# Patient Record
Sex: FEMALE | Race: WHITE | NOT HISPANIC OR LATINO | Employment: OTHER | URBAN - METROPOLITAN AREA
[De-identification: names, ages, dates, MRNs, and addresses within clinical notes are randomized per-mention and may not be internally consistent; named-entity substitution may affect disease eponyms.]

---

## 2017-01-17 ENCOUNTER — CONVERSION ENCOUNTER (OUTPATIENT)
Dept: FAMILY MEDICINE CLINIC | Facility: CLINIC | Age: 77
End: 2017-01-17

## 2017-01-17 ENCOUNTER — HOSPITAL ENCOUNTER (OUTPATIENT)
Dept: FAMILY MEDICINE CLINIC | Facility: CLINIC | Age: 77
Setting detail: SPECIMEN
Discharge: HOME | End: 2017-01-17
Attending: FAMILY MEDICINE | Admitting: FAMILY MEDICINE

## 2017-01-17 LAB
ALBUMIN SERPL-MCNC: 4 G/DL (ref 3.5–4.8)
ALBUMIN/GLOB SERPL: 1.3 {RATIO} (ref 1–1.7)
ALP SERPL-CCNC: 126 IU/L (ref 32–91)
ALT SERPL-CCNC: 20 IU/L (ref 14–54)
ANION GAP SERPL CALC-SCNC: 13.1 MMOL/L (ref 10–20)
AST SERPL-CCNC: 25 IU/L (ref 15–41)
BILIRUB SERPL-MCNC: 0.5 MG/DL (ref 0.3–1.2)
BUN SERPL-MCNC: 22 MG/DL (ref 8–20)
BUN/CREAT SERPL: 20 (ref 5.4–26.2)
CALCIUM SERPL-MCNC: 9.4 MG/DL (ref 8.9–10.3)
CHLORIDE SERPL-SCNC: 107 MMOL/L (ref 101–111)
CHOLEST SERPL-MCNC: 269 MG/DL
CHOLEST/HDLC SERPL: 5.3 {RATIO}
CONV CO2: 27 MMOL/L (ref 22–32)
CONV LDL CHOLESTEROL DIRECT: 164 MG/DL (ref 0–100)
CONV TOTAL PROTEIN: 7.1 G/DL (ref 6.1–7.9)
CREAT UR-MCNC: 1.1 MG/DL (ref 0.4–1)
FOLATE SERPL-MCNC: 12.2 NG/ML (ref 5.9–24.8)
GLOBULIN UR ELPH-MCNC: 3.1 G/DL (ref 2.5–3.8)
GLUCOSE SERPL-MCNC: 106 MG/DL (ref 65–99)
HDLC SERPL-MCNC: 51 MG/DL
LDLC/HDLC SERPL: 3.2 {RATIO}
LIPID INTERPRETATION: ABNORMAL
POTASSIUM SERPL-SCNC: 5.1 MMOL/L (ref 3.6–5.1)
SODIUM SERPL-SCNC: 142 MMOL/L (ref 136–144)
TRIGL SERPL-MCNC: 363 MG/DL
TSH SERPL-ACNC: 2.66 UIU/ML (ref 0.34–5.6)
VIT B12 SERPL-MCNC: 352 PG/ML (ref 180–914)
VLDLC SERPL CALC-MCNC: 54.4 MG/DL

## 2017-02-07 ENCOUNTER — HOSPITAL ENCOUNTER (OUTPATIENT)
Dept: MRI IMAGING | Facility: HOSPITAL | Age: 77
Discharge: HOME | End: 2017-02-07
Attending: FAMILY MEDICINE | Admitting: FAMILY MEDICINE

## 2017-03-27 ENCOUNTER — CONVERSION ENCOUNTER (OUTPATIENT)
Dept: FAMILY MEDICINE CLINIC | Facility: CLINIC | Age: 77
End: 2017-03-27

## 2017-04-24 ENCOUNTER — CONVERSION ENCOUNTER (OUTPATIENT)
Dept: FAMILY MEDICINE CLINIC | Facility: CLINIC | Age: 77
End: 2017-04-24

## 2017-04-27 ENCOUNTER — HOSPITAL ENCOUNTER (OUTPATIENT)
Dept: CT IMAGING | Facility: HOSPITAL | Age: 77
Discharge: HOME | End: 2017-04-27
Attending: FAMILY MEDICINE | Admitting: FAMILY MEDICINE

## 2017-04-27 LAB — CREAT BLDA-MCNC: 1.1 MG/DL (ref 0.6–1.3)

## 2017-05-08 ENCOUNTER — OFFICE (AMBULATORY)
Dept: URBAN - METROPOLITAN AREA CLINIC 64 | Facility: CLINIC | Age: 77
End: 2017-05-08

## 2017-05-08 VITALS
HEIGHT: 65 IN | DIASTOLIC BLOOD PRESSURE: 80 MMHG | WEIGHT: 181 LBS | HEART RATE: 59 BPM | SYSTOLIC BLOOD PRESSURE: 148 MMHG

## 2017-05-08 DIAGNOSIS — K21.9 GASTRO-ESOPHAGEAL REFLUX DISEASE WITHOUT ESOPHAGITIS: ICD-10-CM

## 2017-05-08 DIAGNOSIS — R05 COUGH: ICD-10-CM

## 2017-05-08 PROCEDURE — 99213 OFFICE O/P EST LOW 20 MIN: CPT | Performed by: INTERNAL MEDICINE

## 2017-05-08 RX ORDER — DEXLANSOPRAZOLE 60 MG/1
60 CAPSULE, DELAYED RELEASE ORAL
Qty: 90 | Refills: 3 | Status: COMPLETED
End: 2019-03-07

## 2017-05-08 RX ORDER — METOCLOPRAMIDE 10 MG/1
10 TABLET ORAL
Qty: 30 | Refills: 11 | Status: COMPLETED
Start: 2017-05-08 | End: 2018-01-03

## 2017-05-10 ENCOUNTER — HOSPITAL ENCOUNTER (OUTPATIENT)
Dept: ULTRASOUND IMAGING | Facility: HOSPITAL | Age: 77
Discharge: HOME | End: 2017-05-10
Attending: FAMILY MEDICINE | Admitting: FAMILY MEDICINE

## 2017-08-07 ENCOUNTER — CONVERSION ENCOUNTER (OUTPATIENT)
Dept: FAMILY MEDICINE CLINIC | Facility: CLINIC | Age: 77
End: 2017-08-07

## 2017-08-07 ENCOUNTER — HOSPITAL ENCOUNTER (OUTPATIENT)
Dept: FAMILY MEDICINE CLINIC | Facility: CLINIC | Age: 77
Setting detail: SPECIMEN
Discharge: HOME | End: 2017-08-07
Attending: FAMILY MEDICINE | Admitting: FAMILY MEDICINE

## 2017-08-07 LAB
ALBUMIN SERPL-MCNC: 3.8 G/DL (ref 3.5–4.8)
ALBUMIN/GLOB SERPL: 1.5 {RATIO} (ref 1–1.7)
ALP SERPL-CCNC: 102 IU/L (ref 32–91)
ALT SERPL-CCNC: ABNORMAL IU/L (ref 14–54)
ANION GAP SERPL CALC-SCNC: 16.1 MMOL/L (ref 10–20)
AST SERPL-CCNC: ABNORMAL IU/L (ref 15–41)
BILIRUB SERPL-MCNC: ABNORMAL MG/DL (ref 0.3–1.2)
BUN SERPL-MCNC: 23 MG/DL (ref 8–20)
BUN/CREAT SERPL: 20.9 (ref 5.4–26.2)
CALCIUM SERPL-MCNC: 9.3 MG/DL (ref 8.9–10.3)
CHLORIDE SERPL-SCNC: 110 MMOL/L (ref 101–111)
CHOLEST SERPL-MCNC: 179 MG/DL
CHOLEST/HDLC SERPL: 4 {RATIO}
CONV ABS BANDS: 0.3 10*3/UL
CONV CO2: 20 MMOL/L (ref 22–32)
CONV LDL CHOLESTEROL DIRECT: 106 MG/DL (ref 0–100)
CONV TOTAL PROTEIN: 6.4 G/DL (ref 6.1–7.9)
CREAT UR-MCNC: 1.1 MG/DL (ref 0.4–1)
DIFFERENTIAL METHOD BLD: (no result)
EOSINOPHIL # BLD AUTO: 0.1 10*3/UL (ref 0–0.3)
EOSINOPHIL # BLD AUTO: 1 % (ref 0–3)
ERYTHROCYTE [DISTWIDTH] IN BLOOD BY AUTOMATED COUNT: 17 % (ref 11.5–14.5)
GLOBULIN UR ELPH-MCNC: 2.6 G/DL (ref 2.5–3.8)
GLUCOSE SERPL-MCNC: 105 MG/DL (ref 65–99)
HCT VFR BLD AUTO: 36.8 % (ref 35–49)
HDLC SERPL-MCNC: 45 MG/DL
HGB BLD-MCNC: 11.8 G/DL (ref 12–15)
LDLC/HDLC SERPL: 2.4 {RATIO}
LIPID INTERPRETATION: ABNORMAL
LYMPHOCYTES # BLD AUTO: 9.4 10*3/UL (ref 0.8–4.8)
LYMPHOCYTES NFR BLD AUTO: 66 % (ref 18–42)
MCH RBC QN AUTO: 26.2 PG (ref 26–32)
MCHC RBC AUTO-ENTMCNC: 32 G/DL (ref 32–36)
MCV RBC AUTO: 81.7 FL (ref 80–94)
MONOCYTES # BLD AUTO: 0.8 10*3/UL (ref 0.1–1.3)
MONOCYTES NFR BLD AUTO: 6 % (ref 2–11)
NEUTROPHILS # BLD AUTO: 3.5 10*3/UL (ref 2.3–8.6)
NEUTROPHILS NFR BLD AUTO: 25 % (ref 50–75)
NEUTS BAND NFR BLD MANUAL: 2 % (ref 0–5)
PATHOLOGIST REVIEW: (no result)
PATHOLOGIST REVIEW: (no result)
PLATELET # BLD AUTO: 180 10*3/UL (ref 150–450)
PMV BLD AUTO: 10.7 FL (ref 7.4–10.4)
POTASSIUM SERPL-SCNC: ABNORMAL MMOL/L (ref 3.6–5.1)
RBC # BLD AUTO: 4.51 10*6/UL (ref 4–5.4)
SMUDGE CELLS BLD QL SMEAR: (no result) /100{WBCS}
SODIUM SERPL-SCNC: 141 MMOL/L (ref 136–144)
TRIGL SERPL-MCNC: 206 MG/DL
TSH SERPL-ACNC: 1.21 UIU/ML (ref 0.34–5.6)
VLDLC SERPL CALC-MCNC: 28.8 MG/DL
WBC # BLD AUTO: 14.1 10*3/UL (ref 4.5–11.5)

## 2017-08-23 ENCOUNTER — HOSPITAL ENCOUNTER (OUTPATIENT)
Dept: MAMMOGRAPHY | Facility: HOSPITAL | Age: 77
Discharge: HOME | End: 2017-08-23
Attending: FAMILY MEDICINE | Admitting: FAMILY MEDICINE

## 2017-08-23 ENCOUNTER — HOSPITAL ENCOUNTER (OUTPATIENT)
Dept: OTHER | Facility: HOSPITAL | Age: 77
Discharge: HOME | End: 2017-08-23
Attending: FAMILY MEDICINE | Admitting: FAMILY MEDICINE

## 2017-08-23 LAB
ALBUMIN SERPL-MCNC: 4 G/DL (ref 3.5–4.8)
ALBUMIN/GLOB SERPL: 1.4 {RATIO} (ref 1–1.7)
ALP SERPL-CCNC: 102 IU/L (ref 32–91)
ALT SERPL-CCNC: 16 IU/L (ref 14–54)
ANION GAP SERPL CALC-SCNC: 16.8 MMOL/L (ref 10–20)
AST SERPL-CCNC: 22 IU/L (ref 15–41)
BILIRUB SERPL-MCNC: 0.7 MG/DL (ref 0.3–1.2)
BUN SERPL-MCNC: 44 MG/DL (ref 8–20)
BUN/CREAT SERPL: 24.4 (ref 5.4–26.2)
CALCIUM SERPL-MCNC: 9.1 MG/DL (ref 8.9–10.3)
CHLORIDE SERPL-SCNC: 109 MMOL/L (ref 101–111)
CHOLEST SERPL-MCNC: 194 MG/DL
CHOLEST/HDLC SERPL: 4.9 {RATIO}
CONV ABS BANDS: 0.2 10*3/UL
CONV ANISOCYTES: SLIGHT
CONV CO2: 18 MMOL/L (ref 22–32)
CONV LDL CHOLESTEROL DIRECT: 110 MG/DL (ref 0–100)
CONV OVALOCYTES IN BLOOD BY LIGHT MICROSCOPY: (no result)
CONV PLATELET CLUMP  IN BLOOD BY LIGHT MICROSCOPY: (no result)
CONV POLYCHROMASIA IN BLOOD BY LIGHT MICROSCOPY: SLIGHT
CONV TOTAL PROTEIN: 6.8 G/DL (ref 6.1–7.9)
CREAT UR-MCNC: 1.8 MG/DL (ref 0.4–1)
DIFFERENTIAL METHOD BLD: (no result)
ERYTHROCYTE [DISTWIDTH] IN BLOOD BY AUTOMATED COUNT: 17.5 % (ref 11.5–14.5)
GLOBULIN UR ELPH-MCNC: 2.8 G/DL (ref 2.5–3.8)
GLUCOSE SERPL-MCNC: 132 MG/DL (ref 65–99)
HCT VFR BLD AUTO: 37.6 % (ref 35–49)
HDLC SERPL-MCNC: 39 MG/DL
HGB BLD-MCNC: 11.8 G/DL (ref 12–15)
LDLC/HDLC SERPL: 2.8 {RATIO}
LIPID INTERPRETATION: ABNORMAL
LYMPHOCYTES # BLD AUTO: 11.2 10*3/UL (ref 0.8–4.8)
LYMPHOCYTES NFR BLD AUTO: 74 % (ref 18–42)
MCH RBC QN AUTO: 25.5 PG (ref 26–32)
MCHC RBC AUTO-ENTMCNC: 31.4 G/DL (ref 32–36)
MCV RBC AUTO: 81.4 FL (ref 80–94)
MONOCYTES # BLD AUTO: 0.2 10*3/UL (ref 0.1–1.3)
MONOCYTES NFR BLD AUTO: 1 % (ref 2–11)
NEUTROPHILS # BLD AUTO: 3.6 10*3/UL (ref 2.3–8.6)
NEUTROPHILS NFR BLD AUTO: 24 % (ref 50–75)
NEUTS BAND NFR BLD MANUAL: 1 % (ref 0–5)
PATHOLOGIST REVIEW: (no result)
PATHOLOGIST REVIEW: (no result)
PLATELET # BLD AUTO: 193 10*3/UL (ref 150–450)
PMV BLD AUTO: 10.3 FL (ref 7.4–10.4)
POTASSIUM SERPL-SCNC: 4.8 MMOL/L (ref 3.6–5.1)
RBC # BLD AUTO: 4.62 10*6/UL (ref 4–5.4)
SCHISTOCYTES BLD QL SMEAR: (no result)
SMUDGE CELLS BLD QL SMEAR: (no result) /100{WBCS}
SODIUM SERPL-SCNC: 139 MMOL/L (ref 136–144)
TRIGL SERPL-MCNC: 279 MG/DL
TSH SERPL-ACNC: 1.34 UIU/ML (ref 0.34–5.6)
VLDLC SERPL CALC-MCNC: 44.4 MG/DL
WBC # BLD AUTO: 15.2 10*3/UL (ref 4.5–11.5)

## 2017-08-28 ENCOUNTER — HOSPITAL ENCOUNTER (OUTPATIENT)
Dept: ONCOLOGY | Facility: CLINIC | Age: 77
Setting detail: INFUSION SERIES
Discharge: HOME | End: 2017-08-28
Attending: INTERNAL MEDICINE | Admitting: INTERNAL MEDICINE

## 2017-08-28 ENCOUNTER — CLINICAL SUPPORT (OUTPATIENT)
Dept: ONCOLOGY | Facility: HOSPITAL | Age: 77
End: 2017-08-28

## 2017-08-28 ENCOUNTER — HOSPITAL ENCOUNTER (OUTPATIENT)
Dept: ONCOLOGY | Facility: HOSPITAL | Age: 77
Discharge: HOME | End: 2017-08-28
Attending: INTERNAL MEDICINE | Admitting: INTERNAL MEDICINE

## 2017-08-28 LAB
FERRITIN SERPL-MCNC: 22 NG/ML (ref 11–307)
FOLATE SERPL-MCNC: 13.2 NG/ML (ref 5.9–24.8)
IRON SATN MFR SERPL: 11 % (ref 15–50)
IRON SERPL-MCNC: 52 UG/DL (ref 28–170)
LDH SERPL-CCNC: 133 IU/L (ref 98–192)
MAGNESIUM UR-MCNC: 0.78 % (ref 0.5–1.5)
RETICS/RBC NFR MANUAL: 0.04 10*6/UL
TIBC SERPL-MCNC: 471 UG/DL (ref 228–428)
VIT B12 SERPL-MCNC: 312 PG/ML (ref 180–914)

## 2017-08-28 NOTE — PROGRESS NOTES
PATIENTS ONCOLOGY RECORD LOCATED IN [facility]      Subjective     Name:  LILI APPLE     Date:  2017  Address:  [address] [place] [address] [address] [address]  Home: [phone]  :  1940 AGE:  77 y.o.        RECORDS OBTAINED:  Patients Oncology Record is located in [facility]

## 2017-08-31 ENCOUNTER — HOSPITAL ENCOUNTER (OUTPATIENT)
Dept: FAMILY MEDICINE CLINIC | Facility: CLINIC | Age: 77
Setting detail: SPECIMEN
Discharge: HOME | End: 2017-08-31
Attending: FAMILY MEDICINE | Admitting: FAMILY MEDICINE

## 2017-08-31 LAB
ANION GAP SERPL CALC-SCNC: 14.6 MMOL/L (ref 10–20)
BUN SERPL-MCNC: 40 MG/DL (ref 8–20)
BUN/CREAT SERPL: 21.1 (ref 5.4–26.2)
CALCIUM SERPL-MCNC: 9.6 MG/DL (ref 8.9–10.3)
CHLORIDE SERPL-SCNC: 109 MMOL/L (ref 101–111)
CONV CO2: 22 MMOL/L (ref 22–32)
CREAT UR-MCNC: 1.9 MG/DL (ref 0.4–1)
GLUCOSE SERPL-MCNC: 115 MG/DL (ref 65–99)
POTASSIUM SERPL-SCNC: 4.6 MMOL/L (ref 3.6–5.1)
SODIUM SERPL-SCNC: 141 MMOL/L (ref 136–144)

## 2017-09-06 ENCOUNTER — HOSPITAL ENCOUNTER (OUTPATIENT)
Dept: ONCOLOGY | Facility: HOSPITAL | Age: 77
Discharge: HOME | End: 2017-09-06
Attending: INTERNAL MEDICINE | Admitting: INTERNAL MEDICINE

## 2017-09-06 ENCOUNTER — HOSPITAL ENCOUNTER (OUTPATIENT)
Dept: ONCOLOGY | Facility: CLINIC | Age: 77
Setting detail: INFUSION SERIES
Discharge: HOME | End: 2017-09-06
Attending: INTERNAL MEDICINE | Admitting: INTERNAL MEDICINE

## 2017-09-06 ENCOUNTER — CLINICAL SUPPORT (OUTPATIENT)
Dept: ONCOLOGY | Facility: HOSPITAL | Age: 77
End: 2017-09-06

## 2017-09-06 NOTE — PROGRESS NOTES
PATIENTS ONCOLOGY RECORD LOCATED IN [facility]      Subjective     Name:  LILI APPLE     Date:  2017  Address:  [address] [place] [address] [address] [address]  Home: [phone]  :  1940 AGE:  77 y.o.        RECORDS OBTAINED:  Patients Oncology Record is located in [facility]

## 2017-09-25 ENCOUNTER — HOSPITAL ENCOUNTER (OUTPATIENT)
Dept: FAMILY MEDICINE CLINIC | Facility: CLINIC | Age: 77
Setting detail: SPECIMEN
Discharge: HOME | End: 2017-09-25
Attending: FAMILY MEDICINE | Admitting: FAMILY MEDICINE

## 2017-09-25 LAB
ALBUMIN SERPL-MCNC: 4.1 G/DL (ref 3.5–4.8)
ALBUMIN/GLOB SERPL: 1.8 {RATIO} (ref 1–1.7)
ALP SERPL-CCNC: 105 IU/L (ref 32–91)
ALT SERPL-CCNC: 19 IU/L (ref 14–54)
ANION GAP SERPL CALC-SCNC: 12.2 MMOL/L (ref 10–20)
AST SERPL-CCNC: 20 IU/L (ref 15–41)
BILIRUB SERPL-MCNC: 0.6 MG/DL (ref 0.3–1.2)
BUN SERPL-MCNC: 38 MG/DL (ref 8–20)
BUN/CREAT SERPL: 23.8 (ref 5.4–26.2)
CALCIUM SERPL-MCNC: 9.7 MG/DL (ref 8.9–10.3)
CHLORIDE SERPL-SCNC: 105 MMOL/L (ref 101–111)
CONV CO2: 22 MMOL/L (ref 22–32)
CONV TOTAL PROTEIN: 6.4 G/DL (ref 6.1–7.9)
CREAT UR-MCNC: 1.6 MG/DL (ref 0.4–1)
GLOBULIN UR ELPH-MCNC: 2.3 G/DL (ref 2.5–3.8)
GLUCOSE SERPL-MCNC: 104 MG/DL (ref 65–99)
LDH SERPL-CCNC: 117 IU/L (ref 98–192)
POTASSIUM SERPL-SCNC: 5.2 MMOL/L (ref 3.6–5.1)
SODIUM SERPL-SCNC: 134 MMOL/L (ref 136–144)

## 2017-10-11 ENCOUNTER — HOSPITAL ENCOUNTER (OUTPATIENT)
Dept: ULTRASOUND IMAGING | Facility: HOSPITAL | Age: 77
Discharge: HOME | End: 2017-10-11
Attending: INTERNAL MEDICINE | Admitting: INTERNAL MEDICINE

## 2017-10-18 ENCOUNTER — HOSPITAL ENCOUNTER (OUTPATIENT)
Dept: ONCOLOGY | Facility: CLINIC | Age: 77
Setting detail: INFUSION SERIES
Discharge: HOME | End: 2017-10-18
Attending: INTERNAL MEDICINE | Admitting: INTERNAL MEDICINE

## 2017-10-18 ENCOUNTER — HOSPITAL ENCOUNTER (OUTPATIENT)
Dept: ONCOLOGY | Facility: HOSPITAL | Age: 77
Discharge: HOME | End: 2017-10-18
Attending: INTERNAL MEDICINE | Admitting: INTERNAL MEDICINE

## 2017-10-18 ENCOUNTER — CLINICAL SUPPORT (OUTPATIENT)
Dept: ONCOLOGY | Facility: HOSPITAL | Age: 77
End: 2017-10-18

## 2017-10-18 NOTE — PROGRESS NOTES
PATIENTS ONCOLOGY RECORD LOCATED IN [facility]      Subjective     Name:  LILI APPLE     Date:  10/18/2017  Address:  [address] [place] [address] [address] [address]  Home: [phone]  :  1940 AGE:  77 y.o.        RECORDS OBTAINED:  Patients Oncology Record is located in [facility]

## 2017-12-13 ENCOUNTER — HOSPITAL ENCOUNTER (OUTPATIENT)
Dept: ONCOLOGY | Facility: CLINIC | Age: 77
Setting detail: INFUSION SERIES
Discharge: HOME | End: 2017-12-13
Attending: INTERNAL MEDICINE | Admitting: INTERNAL MEDICINE

## 2017-12-13 ENCOUNTER — CLINICAL SUPPORT (OUTPATIENT)
Dept: ONCOLOGY | Facility: HOSPITAL | Age: 77
End: 2017-12-13

## 2017-12-13 ENCOUNTER — HOSPITAL ENCOUNTER (OUTPATIENT)
Dept: ONCOLOGY | Facility: HOSPITAL | Age: 77
Discharge: HOME | End: 2017-12-13
Attending: INTERNAL MEDICINE | Admitting: INTERNAL MEDICINE

## 2017-12-13 LAB
ALBUMIN SERPL-MCNC: 3.9 G/DL (ref 3.5–4.8)
ALBUMIN/GLOB SERPL: 1.4 {RATIO} (ref 1–1.7)
ALP SERPL-CCNC: 110 IU/L (ref 32–91)
ALT SERPL-CCNC: 18 IU/L (ref 14–54)
ANION GAP SERPL CALC-SCNC: 12.3 MMOL/L (ref 10–20)
AST SERPL-CCNC: 22 IU/L (ref 15–41)
BILIRUB SERPL-MCNC: 0.4 MG/DL (ref 0.3–1.2)
BUN SERPL-MCNC: 32 MG/DL (ref 8–20)
BUN/CREAT SERPL: 24.6 (ref 5.4–26.2)
CALCIUM SERPL-MCNC: 9.3 MG/DL (ref 8.9–10.3)
CHLORIDE SERPL-SCNC: 106 MMOL/L (ref 101–111)
CONV CO2: 23 MMOL/L (ref 22–32)
CONV TOTAL PROTEIN: 6.6 G/DL (ref 6.1–7.9)
CREAT UR-MCNC: 1.3 MG/DL (ref 0.4–1)
GLOBULIN UR ELPH-MCNC: 2.7 G/DL (ref 2.5–3.8)
GLUCOSE SERPL-MCNC: 101 MG/DL (ref 65–99)
LDH SERPL-CCNC: 126 IU/L (ref 98–192)
POTASSIUM SERPL-SCNC: 4.3 MMOL/L (ref 3.6–5.1)
SODIUM SERPL-SCNC: 137 MMOL/L (ref 136–144)

## 2017-12-13 NOTE — PROGRESS NOTES
PATIENTS ONCOLOGY RECORD LOCATED IN [facility]      Subjective     Name:  LILI APPLE     Date:  2017  Address:  [address] [place] [address] [address] [address]  Home: [phone]  :  1940 AGE:  77 y.o.        RECORDS OBTAINED:  Patients Oncology Record is located in [facility]

## 2017-12-20 ENCOUNTER — HOSPITAL ENCOUNTER (OUTPATIENT)
Dept: ONCOLOGY | Facility: CLINIC | Age: 77
Setting detail: INFUSION SERIES
Discharge: HOME | End: 2017-12-20
Attending: INTERNAL MEDICINE | Admitting: INTERNAL MEDICINE

## 2017-12-20 ENCOUNTER — HOSPITAL ENCOUNTER (OUTPATIENT)
Dept: ONCOLOGY | Facility: HOSPITAL | Age: 77
Discharge: HOME | End: 2017-12-20
Attending: INTERNAL MEDICINE | Admitting: INTERNAL MEDICINE

## 2017-12-20 ENCOUNTER — CLINICAL SUPPORT (OUTPATIENT)
Dept: ONCOLOGY | Facility: HOSPITAL | Age: 77
End: 2017-12-20

## 2017-12-20 NOTE — PROGRESS NOTES
PATIENTS ONCOLOGY RECORD LOCATED IN [facility]      Subjective     Name:  LILI APPLE     Date:  2017  Address:  [address] [place] [address] [address] [address]  Home: [phone]  :  1940 AGE:  77 y.o.        RECORDS OBTAINED:  Patients Oncology Record is located in [facility]

## 2018-01-03 ENCOUNTER — OFFICE (AMBULATORY)
Dept: URBAN - METROPOLITAN AREA CLINIC 64 | Facility: CLINIC | Age: 78
End: 2018-01-03

## 2018-01-03 VITALS
DIASTOLIC BLOOD PRESSURE: 75 MMHG | HEART RATE: 67 BPM | WEIGHT: 178 LBS | HEIGHT: 65 IN | SYSTOLIC BLOOD PRESSURE: 147 MMHG

## 2018-01-03 DIAGNOSIS — K59.00 CONSTIPATION, UNSPECIFIED: ICD-10-CM

## 2018-01-03 PROCEDURE — 99214 OFFICE O/P EST MOD 30 MIN: CPT | Performed by: NURSE PRACTITIONER

## 2018-01-17 ENCOUNTER — HOSPITAL ENCOUNTER (OUTPATIENT)
Dept: FAMILY MEDICINE CLINIC | Facility: CLINIC | Age: 78
Setting detail: SPECIMEN
Discharge: HOME | End: 2018-01-17
Attending: FAMILY MEDICINE | Admitting: FAMILY MEDICINE

## 2018-01-17 ENCOUNTER — CONVERSION ENCOUNTER (OUTPATIENT)
Dept: FAMILY MEDICINE CLINIC | Facility: CLINIC | Age: 78
End: 2018-01-17

## 2018-01-17 LAB
ALBUMIN SERPL-MCNC: 3.7 G/DL (ref 3.5–4.8)
ALBUMIN/GLOB SERPL: 1.4 {RATIO} (ref 1–1.7)
ALP SERPL-CCNC: 110 IU/L (ref 32–91)
ALT SERPL-CCNC: 18 IU/L (ref 14–54)
ANION GAP SERPL CALC-SCNC: 12.6 MMOL/L (ref 10–20)
AST SERPL-CCNC: 20 IU/L (ref 15–41)
BILIRUB SERPL-MCNC: 0.7 MG/DL (ref 0.3–1.2)
BUN SERPL-MCNC: 27 MG/DL (ref 8–20)
BUN/CREAT SERPL: 24.5 (ref 5.4–26.2)
CALCIUM SERPL-MCNC: 9 MG/DL (ref 8.9–10.3)
CHLORIDE SERPL-SCNC: 107 MMOL/L (ref 101–111)
CHOLEST SERPL-MCNC: 238 MG/DL
CHOLEST/HDLC SERPL: 5.7 {RATIO}
CONV CO2: 24 MMOL/L (ref 22–32)
CONV LDL CHOLESTEROL DIRECT: 149 MG/DL (ref 0–100)
CONV TOTAL PROTEIN: 6.4 G/DL (ref 6.1–7.9)
CREAT UR-MCNC: 1.1 MG/DL (ref 0.4–1)
DIFFERENTIAL METHOD BLD: (no result)
EOSINOPHIL # BLD AUTO: 0.3 10*3/UL (ref 0–0.3)
EOSINOPHIL # BLD AUTO: 2 % (ref 0–3)
ERYTHROCYTE [DISTWIDTH] IN BLOOD BY AUTOMATED COUNT: 15.5 % (ref 11.5–14.5)
GLOBULIN UR ELPH-MCNC: 2.7 G/DL (ref 2.5–3.8)
GLUCOSE SERPL-MCNC: 136 MG/DL (ref 65–99)
HCT VFR BLD AUTO: 38 % (ref 35–49)
HDLC SERPL-MCNC: 42 MG/DL
HGB BLD-MCNC: 12 G/DL (ref 12–15)
LDLC/HDLC SERPL: 3.6 {RATIO}
LIPID INTERPRETATION: ABNORMAL
LYMPHOCYTES # BLD AUTO: 8.5 10*3/UL (ref 0.8–4.8)
LYMPHOCYTES NFR BLD AUTO: 56 % (ref 18–42)
MCH RBC QN AUTO: 26.9 PG (ref 26–32)
MCHC RBC AUTO-ENTMCNC: 31.6 G/DL (ref 32–36)
MCV RBC AUTO: 85.2 FL (ref 80–94)
MONOCYTES # BLD AUTO: 0.9 10*3/UL (ref 0.1–1.3)
MONOCYTES NFR BLD AUTO: 6 % (ref 2–11)
NEUTROPHILS # BLD AUTO: 5.5 10*3/UL (ref 2.3–8.6)
NEUTROPHILS NFR BLD AUTO: 36 % (ref 50–75)
PATHOLOGIST REVIEW: (no result)
PLATELET # BLD AUTO: 185 10*3/UL (ref 150–450)
PMV BLD AUTO: 10.9 FL (ref 7.4–10.4)
POTASSIUM SERPL-SCNC: 4.6 MMOL/L (ref 3.6–5.1)
RBC # BLD AUTO: 4.46 10*6/UL (ref 4–5.4)
SODIUM SERPL-SCNC: 139 MMOL/L (ref 136–144)
TRIGL SERPL-MCNC: 275 MG/DL
VLDLC SERPL CALC-MCNC: 47.5 MG/DL
WBC # BLD AUTO: 15.2 10*3/UL (ref 4.5–11.5)

## 2018-02-01 ENCOUNTER — CONVERSION ENCOUNTER (OUTPATIENT)
Dept: FAMILY MEDICINE CLINIC | Facility: CLINIC | Age: 78
End: 2018-02-01

## 2018-04-16 ENCOUNTER — HOSPITAL ENCOUNTER (OUTPATIENT)
Dept: ONCOLOGY | Facility: CLINIC | Age: 78
Setting detail: INFUSION SERIES
Discharge: HOME | End: 2018-04-16
Attending: INTERNAL MEDICINE | Admitting: INTERNAL MEDICINE

## 2018-04-16 ENCOUNTER — CLINICAL SUPPORT (OUTPATIENT)
Dept: ONCOLOGY | Facility: HOSPITAL | Age: 78
End: 2018-04-16

## 2018-04-16 ENCOUNTER — HOSPITAL ENCOUNTER (OUTPATIENT)
Dept: ONCOLOGY | Facility: HOSPITAL | Age: 78
Discharge: HOME | End: 2018-04-16
Attending: INTERNAL MEDICINE | Admitting: INTERNAL MEDICINE

## 2018-04-16 LAB
ALBUMIN SERPL-MCNC: 4.2 G/DL (ref 3.5–4.8)
ALBUMIN/GLOB SERPL: 1.5 {RATIO} (ref 1–1.7)
ALP SERPL-CCNC: 95 IU/L (ref 32–91)
ALT SERPL-CCNC: 18 IU/L (ref 14–54)
ANION GAP SERPL CALC-SCNC: 12.9 MMOL/L (ref 10–20)
AST SERPL-CCNC: 22 IU/L (ref 15–41)
BILIRUB SERPL-MCNC: 0.4 MG/DL (ref 0.3–1.2)
BUN SERPL-MCNC: 23 MG/DL (ref 8–20)
BUN/CREAT SERPL: 25.6 (ref 5.4–26.2)
CALCIUM SERPL-MCNC: 9.9 MG/DL (ref 8.9–10.3)
CHLORIDE SERPL-SCNC: 105 MMOL/L (ref 101–111)
CONV CO2: 26 MMOL/L (ref 22–32)
CONV TOTAL PROTEIN: 7 G/DL (ref 6.1–7.9)
CREAT UR-MCNC: 0.9 MG/DL (ref 0.4–1)
GLOBULIN UR ELPH-MCNC: 2.8 G/DL (ref 2.5–3.8)
GLUCOSE SERPL-MCNC: 100 MG/DL (ref 65–99)
LDH SERPL-CCNC: 136 IU/L (ref 98–192)
POTASSIUM SERPL-SCNC: 4.9 MMOL/L (ref 3.6–5.1)
SODIUM SERPL-SCNC: 139 MMOL/L (ref 136–144)

## 2018-04-16 NOTE — PROGRESS NOTES
PATIENTS ONCOLOGY RECORD LOCATED IN [facility]      Subjective     Name:  LILI APPLE     Date:  2018  Address:  [address] [place] [address] [address] [address]  Home: [phone]  :  1940 AGE:  77 y.o.        RECORDS OBTAINED:  Patients Oncology Record is located in [facility]

## 2018-04-25 ENCOUNTER — CLINICAL SUPPORT (OUTPATIENT)
Dept: ONCOLOGY | Facility: HOSPITAL | Age: 78
End: 2018-04-25

## 2018-04-25 ENCOUNTER — HOSPITAL ENCOUNTER (OUTPATIENT)
Dept: ONCOLOGY | Facility: CLINIC | Age: 78
Setting detail: INFUSION SERIES
Discharge: HOME | End: 2018-04-25
Attending: INTERNAL MEDICINE | Admitting: INTERNAL MEDICINE

## 2018-04-25 NOTE — PROGRESS NOTES
PATIENTS ONCOLOGY RECORD LOCATED IN [facility]      Subjective     Name:  LILI APPLE     Date:  2018  Address:  [address] [place] [address] [address] [address]  Home: [phone]  :  1940 AGE:  77 y.o.        RECORDS OBTAINED:  Patients Oncology Record is located in [facility]

## 2018-05-07 ENCOUNTER — HOSPITAL ENCOUNTER (OUTPATIENT)
Dept: ONCOLOGY | Facility: HOSPITAL | Age: 78
Discharge: HOME | End: 2018-05-07
Attending: INTERNAL MEDICINE | Admitting: INTERNAL MEDICINE

## 2018-05-25 ENCOUNTER — CLINICAL SUPPORT (OUTPATIENT)
Dept: ONCOLOGY | Facility: HOSPITAL | Age: 78
End: 2018-05-25

## 2018-05-25 ENCOUNTER — HOSPITAL ENCOUNTER (OUTPATIENT)
Dept: ONCOLOGY | Facility: CLINIC | Age: 78
Setting detail: INFUSION SERIES
Discharge: HOME | End: 2018-05-25
Attending: INTERNAL MEDICINE | Admitting: INTERNAL MEDICINE

## 2018-05-25 NOTE — PROGRESS NOTES
PATIENTS ONCOLOGY RECORD LOCATED IN [facility]      Subjective     Name:  LILI APPLE     Date:  2018  Address:  [address] [place] [address] [address] [address]  Home: [phone]  :  1940 AGE:  78 y.o.        RECORDS OBTAINED:  Patients Oncology Record is located in [facility]

## 2018-06-14 ENCOUNTER — OFFICE (AMBULATORY)
Dept: URBAN - METROPOLITAN AREA CLINIC 64 | Facility: CLINIC | Age: 78
End: 2018-06-14

## 2018-06-14 VITALS
WEIGHT: 169 LBS | SYSTOLIC BLOOD PRESSURE: 116 MMHG | HEIGHT: 65 IN | DIASTOLIC BLOOD PRESSURE: 57 MMHG | HEART RATE: 71 BPM

## 2018-06-14 DIAGNOSIS — R19.4 CHANGE IN BOWEL HABIT: ICD-10-CM

## 2018-06-14 DIAGNOSIS — R11.0 NAUSEA: ICD-10-CM

## 2018-06-14 DIAGNOSIS — R10.84 GENERALIZED ABDOMINAL PAIN: ICD-10-CM

## 2018-06-14 DIAGNOSIS — K21.9 GASTRO-ESOPHAGEAL REFLUX DISEASE WITHOUT ESOPHAGITIS: ICD-10-CM

## 2018-06-14 DIAGNOSIS — R13.19 OTHER DYSPHAGIA: ICD-10-CM

## 2018-06-14 DIAGNOSIS — K62.5 HEMORRHAGE OF ANUS AND RECTUM: ICD-10-CM

## 2018-06-14 PROCEDURE — 99213 OFFICE O/P EST LOW 20 MIN: CPT | Performed by: NURSE PRACTITIONER

## 2018-06-14 RX ORDER — METOCLOPRAMIDE 10 MG/1
TABLET ORAL
Qty: 30 | Refills: 6 | Status: COMPLETED
End: 2019-03-07

## 2018-07-16 ENCOUNTER — HOSPITAL ENCOUNTER (OUTPATIENT)
Dept: FAMILY MEDICINE CLINIC | Facility: CLINIC | Age: 78
Setting detail: SPECIMEN
Discharge: HOME | End: 2018-07-16
Attending: FAMILY MEDICINE | Admitting: FAMILY MEDICINE

## 2018-07-16 ENCOUNTER — CONVERSION ENCOUNTER (OUTPATIENT)
Dept: FAMILY MEDICINE CLINIC | Facility: CLINIC | Age: 78
End: 2018-07-16

## 2018-07-16 LAB
ALBUMIN SERPL-MCNC: 3.7 G/DL (ref 3.5–4.8)
ALBUMIN/GLOB SERPL: 1.5 {RATIO} (ref 1–1.7)
ALP SERPL-CCNC: 115 IU/L (ref 32–91)
ALT SERPL-CCNC: 17 IU/L (ref 14–54)
ANION GAP SERPL CALC-SCNC: 10.6 MMOL/L (ref 10–20)
AST SERPL-CCNC: 23 IU/L (ref 15–41)
BILIRUB SERPL-MCNC: 0.2 MG/DL (ref 0.3–1.2)
BUN SERPL-MCNC: 27 MG/DL (ref 8–20)
BUN/CREAT SERPL: 24.5 (ref 5.4–26.2)
CALCIUM SERPL-MCNC: 9.2 MG/DL (ref 8.9–10.3)
CHLORIDE SERPL-SCNC: 106 MMOL/L (ref 101–111)
CHOLEST SERPL-MCNC: 222 MG/DL
CHOLEST/HDLC SERPL: 5.3 {RATIO}
CONV ANISOCYTES: SLIGHT
CONV CO2: 26 MMOL/L (ref 22–32)
CONV HYPOCHROMIA IN BLOOD BY LIGHT MICROSCOPY: SLIGHT
CONV LDL CHOLESTEROL DIRECT: 134 MG/DL (ref 0–100)
CONV MICROCYTES IN BLOOD BY LIGHT MICROSCOPY: (no result)
CONV POIKILOCYTOSIS IN BLOOD BY LIGHT MICROSCOPY: SLIGHT
CONV TOTAL PROTEIN: 6.2 G/DL (ref 6.1–7.9)
CONV TOXIC GRANULES IN BLOOD BY LIGHT MICROSCOPY: SLIGHT
CREAT UR-MCNC: 1.1 MG/DL (ref 0.4–1)
DIFFERENTIAL METHOD BLD: (no result)
ERYTHROCYTE [DISTWIDTH] IN BLOOD BY AUTOMATED COUNT: 16.3 % (ref 11.5–14.5)
GLOBULIN UR ELPH-MCNC: 2.5 G/DL (ref 2.5–3.8)
GLUCOSE SERPL-MCNC: 109 MG/DL (ref 65–99)
HCT VFR BLD AUTO: 38.5 % (ref 35–49)
HDLC SERPL-MCNC: 42 MG/DL
HGB BLD-MCNC: 12 G/DL (ref 12–15)
LDLC/HDLC SERPL: 3.2 {RATIO}
LIPID INTERPRETATION: ABNORMAL
LYMPHOCYTES # BLD AUTO: 10.5 10*3/UL (ref 0.8–4.8)
LYMPHOCYTES NFR BLD AUTO: 75 % (ref 18–42)
MCH RBC QN AUTO: 26.2 PG (ref 26–32)
MCHC RBC AUTO-ENTMCNC: 31.3 G/DL (ref 32–36)
MCV RBC AUTO: 83.6 FL (ref 80–94)
MONOCYTES # BLD AUTO: 0.3 10*3/UL (ref 0.1–1.3)
MONOCYTES NFR BLD AUTO: 2 % (ref 2–11)
NEUTROPHILS # BLD AUTO: 3.2 10*3/UL (ref 2.3–8.6)
NEUTROPHILS NFR BLD AUTO: 23 % (ref 50–75)
PATHOLOGIST REVIEW: (no result)
PATHOLOGIST REVIEW: (no result)
PLATELET # BLD AUTO: 177 10*3/UL (ref 150–450)
PMV BLD AUTO: 10.6 FL (ref 7.4–10.4)
POTASSIUM SERPL-SCNC: 4.6 MMOL/L (ref 3.6–5.1)
RBC # BLD AUTO: 4.6 10*6/UL (ref 4–5.4)
SODIUM SERPL-SCNC: 138 MMOL/L (ref 136–144)
TRIGL SERPL-MCNC: 274 MG/DL
VLDLC SERPL CALC-MCNC: 45.7 MG/DL
WBC # BLD AUTO: 14 10*3/UL (ref 4.5–11.5)

## 2018-07-19 ENCOUNTER — ON CAMPUS - OUTPATIENT (AMBULATORY)
Dept: URBAN - METROPOLITAN AREA HOSPITAL 77 | Facility: HOSPITAL | Age: 78
End: 2018-07-19
Payer: COMMERCIAL

## 2018-07-19 DIAGNOSIS — R19.4 CHANGE IN BOWEL HABIT: ICD-10-CM

## 2018-07-19 DIAGNOSIS — K57.30 DIVERTICULOSIS OF LARGE INTESTINE WITHOUT PERFORATION OR ABS: ICD-10-CM

## 2018-07-19 DIAGNOSIS — R11.2 NAUSEA WITH VOMITING, UNSPECIFIED: ICD-10-CM

## 2018-07-19 DIAGNOSIS — K44.9 DIAPHRAGMATIC HERNIA WITHOUT OBSTRUCTION OR GANGRENE: ICD-10-CM

## 2018-07-19 DIAGNOSIS — K21.9 GASTRO-ESOPHAGEAL REFLUX DISEASE WITHOUT ESOPHAGITIS: ICD-10-CM

## 2018-07-19 PROCEDURE — 43235 EGD DIAGNOSTIC BRUSH WASH: CPT | Performed by: INTERNAL MEDICINE

## 2018-07-19 PROCEDURE — 45378 DIAGNOSTIC COLONOSCOPY: CPT | Performed by: INTERNAL MEDICINE

## 2018-12-27 ENCOUNTER — CONVERSION ENCOUNTER (OUTPATIENT)
Dept: FAMILY MEDICINE CLINIC | Facility: CLINIC | Age: 78
End: 2018-12-27

## 2018-12-27 ENCOUNTER — HOSPITAL ENCOUNTER (OUTPATIENT)
Dept: FAMILY MEDICINE CLINIC | Facility: CLINIC | Age: 78
Setting detail: SPECIMEN
Discharge: HOME | End: 2018-12-27
Attending: FAMILY MEDICINE | Admitting: FAMILY MEDICINE

## 2018-12-27 LAB
ALBUMIN SERPL-MCNC: 4 G/DL (ref 3.5–4.8)
ALBUMIN/GLOB SERPL: 1.4 {RATIO} (ref 1–1.7)
ALP SERPL-CCNC: 102 IU/L (ref 32–91)
ALT SERPL-CCNC: 13 IU/L (ref 14–54)
ANION GAP SERPL CALC-SCNC: 13.4 MMOL/L (ref 10–20)
AST SERPL-CCNC: 19 IU/L (ref 15–41)
BILIRUB SERPL-MCNC: 0.3 MG/DL (ref 0.3–1.2)
BUN SERPL-MCNC: 20 MG/DL (ref 8–20)
BUN/CREAT SERPL: 20 (ref 5.4–26.2)
CALCIUM SERPL-MCNC: 9.3 MG/DL (ref 8.9–10.3)
CHLORIDE SERPL-SCNC: 105 MMOL/L (ref 101–111)
CHOLEST SERPL-MCNC: 275 MG/DL
CHOLEST/HDLC SERPL: 5.5 {RATIO}
CONV ANISOCYTES: SLIGHT
CONV CO2: 23 MMOL/L (ref 22–32)
CONV DACROCYTES (PRESENCE) IN BLOOD BY LIGHT MICROSCOPY: (no result)
CONV LDL CHOLESTEROL DIRECT: 189 MG/DL (ref 0–100)
CONV POIKILOCYTOSIS IN BLOOD BY LIGHT MICROSCOPY: SLIGHT
CONV TOTAL PROTEIN: 6.8 G/DL (ref 6.1–7.9)
CREAT UR-MCNC: 1 MG/DL (ref 0.4–1)
DIFFERENTIAL METHOD BLD: (no result)
ERYTHROCYTE [DISTWIDTH] IN BLOOD BY AUTOMATED COUNT: 15.1 % (ref 11.5–14.5)
GLOBULIN UR ELPH-MCNC: 2.8 G/DL (ref 2.5–3.8)
GLUCOSE SERPL-MCNC: 97 MG/DL (ref 65–99)
HCT VFR BLD AUTO: 37.6 % (ref 35–49)
HDLC SERPL-MCNC: 50 MG/DL
HGB BLD-MCNC: 12 G/DL (ref 12–15)
LDLC/HDLC SERPL: 3.8 {RATIO}
LIPID INTERPRETATION: ABNORMAL
LYMPHOCYTES # BLD AUTO: 6.8 10*3/UL (ref 0.8–4.8)
LYMPHOCYTES NFR BLD AUTO: 40 % (ref 18–42)
MCH RBC QN AUTO: 27.8 PG (ref 26–32)
MCHC RBC AUTO-ENTMCNC: 31.9 G/DL (ref 32–36)
MCV RBC AUTO: 87.2 FL (ref 80–94)
MONOCYTES # BLD AUTO: 2 10*3/UL (ref 0.1–1.3)
MONOCYTES NFR BLD AUTO: 12 % (ref 2–11)
NEUTROPHILS # BLD AUTO: 8.1 10*3/UL (ref 2.3–8.6)
NEUTROPHILS NFR BLD AUTO: 48 % (ref 50–75)
PLATELET # BLD AUTO: 216 10*3/UL (ref 150–450)
PMV BLD AUTO: 10.1 FL (ref 7.4–10.4)
POTASSIUM SERPL-SCNC: 4.4 MMOL/L (ref 3.6–5.1)
RBC # BLD AUTO: 4.31 10*6/UL (ref 4–5.4)
SODIUM SERPL-SCNC: 137 MMOL/L (ref 136–144)
TRIGL SERPL-MCNC: 290 MG/DL
TSH SERPL-ACNC: 1.53 UIU/ML (ref 0.34–5.6)
VLDLC SERPL CALC-MCNC: 35.4 MG/DL
WBC # BLD AUTO: 16.9 10*3/UL (ref 4.5–11.5)

## 2018-12-28 ENCOUNTER — HOSPITAL ENCOUNTER (OUTPATIENT)
Dept: MAMMOGRAPHY | Facility: HOSPITAL | Age: 78
Discharge: HOME | End: 2018-12-28
Attending: FAMILY MEDICINE | Admitting: FAMILY MEDICINE

## 2018-12-28 LAB — HBA1C MFR BLD: 6.3 % (ref 0–5.6)

## 2019-01-04 ENCOUNTER — HOSPITAL ENCOUNTER (OUTPATIENT)
Dept: MAMMOGRAPHY | Facility: HOSPITAL | Age: 79
Discharge: HOME | End: 2019-01-04
Attending: FAMILY MEDICINE | Admitting: FAMILY MEDICINE

## 2019-01-14 ENCOUNTER — HOSPITAL ENCOUNTER (OUTPATIENT)
Dept: ULTRASOUND IMAGING | Facility: HOSPITAL | Age: 79
Discharge: HOME | End: 2019-01-14
Attending: FAMILY MEDICINE | Admitting: FAMILY MEDICINE

## 2019-01-18 ENCOUNTER — CLINICAL SUPPORT (OUTPATIENT)
Dept: ONCOLOGY | Facility: HOSPITAL | Age: 79
End: 2019-01-18

## 2019-01-18 ENCOUNTER — HOSPITAL ENCOUNTER (OUTPATIENT)
Dept: ONCOLOGY | Facility: CLINIC | Age: 79
Setting detail: INFUSION SERIES
Discharge: HOME | End: 2019-01-18
Attending: INTERNAL MEDICINE | Admitting: INTERNAL MEDICINE

## 2019-01-18 NOTE — PROGRESS NOTES
PATIENTS ONCOLOGY RECORD LOCATED IN [facility]      Subjective     Name:  LILI APPLE     Date:  2019  Address:  [address] [place] [address] [place] [facility]  Home: [unfilled]  :  1940 AGE:  78 y.o.        RECORDS OBTAINED:  Patients Oncology Record is located in [facility]

## 2019-01-24 ENCOUNTER — CONVERSION ENCOUNTER (OUTPATIENT)
Dept: FAMILY MEDICINE CLINIC | Facility: CLINIC | Age: 79
End: 2019-01-24

## 2019-02-13 ENCOUNTER — INPATIENT HOSPITAL (AMBULATORY)
Dept: URBAN - METROPOLITAN AREA HOSPITAL 84 | Facility: HOSPITAL | Age: 79
End: 2019-02-13
Payer: COMMERCIAL

## 2019-02-13 DIAGNOSIS — R13.10 DYSPHAGIA, UNSPECIFIED: ICD-10-CM

## 2019-02-13 DIAGNOSIS — J69.0 PNEUMONITIS DUE TO INHALATION OF FOOD AND VOMIT: ICD-10-CM

## 2019-02-13 DIAGNOSIS — D64.89 OTHER SPECIFIED ANEMIAS: ICD-10-CM

## 2019-02-13 DIAGNOSIS — R63.3 FEEDING DIFFICULTIES: ICD-10-CM

## 2019-02-13 DIAGNOSIS — J96.90 RESPIRATORY FAILURE, UNSPECIFIED, UNSPECIFIED WHETHER WITH H: ICD-10-CM

## 2019-02-13 DIAGNOSIS — K44.9 DIAPHRAGMATIC HERNIA WITHOUT OBSTRUCTION OR GANGRENE: ICD-10-CM

## 2019-02-13 PROCEDURE — 99222 1ST HOSP IP/OBS MODERATE 55: CPT | Performed by: INTERNAL MEDICINE

## 2019-02-14 ENCOUNTER — INPATIENT HOSPITAL (AMBULATORY)
Dept: URBAN - METROPOLITAN AREA HOSPITAL 84 | Facility: HOSPITAL | Age: 79
End: 2019-02-14
Payer: COMMERCIAL

## 2019-02-14 DIAGNOSIS — J44.9 CHRONIC OBSTRUCTIVE PULMONARY DISEASE, UNSPECIFIED: ICD-10-CM

## 2019-02-14 DIAGNOSIS — K44.9 DIAPHRAGMATIC HERNIA WITHOUT OBSTRUCTION OR GANGRENE: ICD-10-CM

## 2019-02-14 DIAGNOSIS — J69.0 PNEUMONITIS DUE TO INHALATION OF FOOD AND VOMIT: ICD-10-CM

## 2019-02-14 DIAGNOSIS — R11.10 VOMITING, UNSPECIFIED: ICD-10-CM

## 2019-02-14 PROCEDURE — 44372 SMALL BOWEL ENDOSCOPY: CPT | Performed by: INTERNAL MEDICINE

## 2019-02-15 ENCOUNTER — INPATIENT HOSPITAL (AMBULATORY)
Dept: URBAN - METROPOLITAN AREA HOSPITAL 84 | Facility: HOSPITAL | Age: 79
End: 2019-02-15
Payer: COMMERCIAL

## 2019-02-15 DIAGNOSIS — D64.89 OTHER SPECIFIED ANEMIAS: ICD-10-CM

## 2019-02-15 DIAGNOSIS — K44.9 DIAPHRAGMATIC HERNIA WITHOUT OBSTRUCTION OR GANGRENE: ICD-10-CM

## 2019-02-15 DIAGNOSIS — J69.0 PNEUMONITIS DUE TO INHALATION OF FOOD AND VOMIT: ICD-10-CM

## 2019-02-15 DIAGNOSIS — R13.10 DYSPHAGIA, UNSPECIFIED: ICD-10-CM

## 2019-02-15 PROCEDURE — 99232 SBSQ HOSP IP/OBS MODERATE 35: CPT | Performed by: NURSE PRACTITIONER

## 2019-03-07 ENCOUNTER — OFFICE (AMBULATORY)
Dept: URBAN - METROPOLITAN AREA CLINIC 64 | Facility: CLINIC | Age: 79
End: 2019-03-07

## 2019-03-07 VITALS
SYSTOLIC BLOOD PRESSURE: 112 MMHG | DIASTOLIC BLOOD PRESSURE: 53 MMHG | HEIGHT: 65 IN | HEART RATE: 89 BPM | WEIGHT: 161 LBS

## 2019-03-07 DIAGNOSIS — K21.9 GASTRO-ESOPHAGEAL REFLUX DISEASE WITHOUT ESOPHAGITIS: ICD-10-CM

## 2019-03-07 DIAGNOSIS — K59.00 CONSTIPATION, UNSPECIFIED: ICD-10-CM

## 2019-03-07 DIAGNOSIS — R63.0 ANOREXIA: ICD-10-CM

## 2019-03-07 DIAGNOSIS — K44.9 DIAPHRAGMATIC HERNIA WITHOUT OBSTRUCTION OR GANGRENE: ICD-10-CM

## 2019-03-07 PROCEDURE — 99214 OFFICE O/P EST MOD 30 MIN: CPT | Performed by: NURSE PRACTITIONER

## 2019-03-07 RX ORDER — POLYETHYLENE GLYCOL 3350 17 G/17G
34 POWDER, FOR SOLUTION ORAL
Qty: 2 | Refills: 11 | Status: ACTIVE
Start: 2019-03-07

## 2019-03-07 RX ORDER — MIRTAZAPINE 15 MG/1
15 TABLET, FILM COATED ORAL
Qty: 30 | Refills: 11 | Status: ACTIVE
Start: 2019-03-07

## 2019-04-04 ENCOUNTER — CONVERSION ENCOUNTER (OUTPATIENT)
Dept: FAMILY MEDICINE CLINIC | Facility: CLINIC | Age: 79
End: 2019-04-04

## 2019-04-18 ENCOUNTER — OFFICE (AMBULATORY)
Dept: URBAN - METROPOLITAN AREA CLINIC 64 | Facility: CLINIC | Age: 79
End: 2019-04-18

## 2019-04-18 VITALS
HEART RATE: 90 BPM | DIASTOLIC BLOOD PRESSURE: 59 MMHG | SYSTOLIC BLOOD PRESSURE: 132 MMHG | WEIGHT: 163 LBS | HEIGHT: 65 IN

## 2019-04-18 DIAGNOSIS — R05 COUGH: ICD-10-CM

## 2019-04-18 DIAGNOSIS — K21.9 GASTRO-ESOPHAGEAL REFLUX DISEASE WITHOUT ESOPHAGITIS: ICD-10-CM

## 2019-04-18 DIAGNOSIS — K44.9 DIAPHRAGMATIC HERNIA WITHOUT OBSTRUCTION OR GANGRENE: ICD-10-CM

## 2019-04-18 PROCEDURE — 99214 OFFICE O/P EST MOD 30 MIN: CPT | Performed by: INTERNAL MEDICINE

## 2019-04-18 RX ORDER — PANTOPRAZOLE SODIUM 40 MG/1
80 TABLET, DELAYED RELEASE ORAL
Qty: 180 | Refills: 3 | Status: ACTIVE

## 2019-04-18 RX ORDER — METOCLOPRAMIDE HYDROCHLORIDE 10 MG/1
TABLET ORAL
Qty: 0 | Refills: 0 | Status: COMPLETED
End: 2019-04-18

## 2019-04-18 RX ORDER — SUCRALFATE 1 G/10ML
800 SUSPENSION ORAL
Qty: 0 | Refills: 0 | Status: COMPLETED
End: 2019-04-18

## 2019-04-18 RX ORDER — METOCLOPRAMIDE 10 MG/1
TABLET ORAL
Qty: 30 | Refills: 0 | Status: ACTIVE

## 2019-06-04 VITALS
HEART RATE: 94 BPM | SYSTOLIC BLOOD PRESSURE: 181 MMHG | BODY MASS INDEX: 27.49 KG/M2 | WEIGHT: 171.6 LBS | DIASTOLIC BLOOD PRESSURE: 75 MMHG | DIASTOLIC BLOOD PRESSURE: 80 MMHG | OXYGEN SATURATION: 93 % | OXYGEN SATURATION: 92 % | DIASTOLIC BLOOD PRESSURE: 67 MMHG | BODY MASS INDEX: 29.3 KG/M2 | BODY MASS INDEX: 29.02 KG/M2 | SYSTOLIC BLOOD PRESSURE: 181 MMHG | HEART RATE: 74 BPM | HEART RATE: 86 BPM | WEIGHT: 181 LBS | SYSTOLIC BLOOD PRESSURE: 156 MMHG | SYSTOLIC BLOOD PRESSURE: 153 MMHG | HEIGHT: 64 IN | SYSTOLIC BLOOD PRESSURE: 163 MMHG | HEART RATE: 92 BPM | DIASTOLIC BLOOD PRESSURE: 80 MMHG | WEIGHT: 161 LBS | WEIGHT: 182 LBS | WEIGHT: 184 LBS | WEIGHT: 170 LBS | BODY MASS INDEX: 30.22 KG/M2 | DIASTOLIC BLOOD PRESSURE: 85 MMHG | WEIGHT: 171 LBS | DIASTOLIC BLOOD PRESSURE: 74 MMHG | WEIGHT: 178 LBS | SYSTOLIC BLOOD PRESSURE: 154 MMHG | OXYGEN SATURATION: 92 % | HEART RATE: 93 BPM | HEIGHT: 64 IN | OXYGEN SATURATION: 94 % | DIASTOLIC BLOOD PRESSURE: 76 MMHG | OXYGEN SATURATION: 99 % | OXYGEN SATURATION: 93 % | HEART RATE: 58 BPM | DIASTOLIC BLOOD PRESSURE: 73 MMHG | HEIGHT: 64 IN | SYSTOLIC BLOOD PRESSURE: 152 MMHG | OXYGEN SATURATION: 95 % | HEIGHT: 64 IN | BODY MASS INDEX: 29.19 KG/M2 | HEIGHT: 64 IN | HEART RATE: 58 BPM | BODY MASS INDEX: 30.39 KG/M2 | HEIGHT: 64 IN | SYSTOLIC BLOOD PRESSURE: 151 MMHG | HEART RATE: 68 BPM | DIASTOLIC BLOOD PRESSURE: 86 MMHG | SYSTOLIC BLOOD PRESSURE: 171 MMHG | HEART RATE: 63 BPM | WEIGHT: 177 LBS | OXYGEN SATURATION: 89 % | SYSTOLIC BLOOD PRESSURE: 159 MMHG | OXYGEN SATURATION: 93 % | OXYGEN SATURATION: 93 % | DIASTOLIC BLOOD PRESSURE: 88 MMHG | HEART RATE: 59 BPM | WEIGHT: 184 LBS

## 2019-06-25 ENCOUNTER — OFFICE VISIT (OUTPATIENT)
Dept: FAMILY MEDICINE CLINIC | Facility: CLINIC | Age: 79
End: 2019-06-25

## 2019-06-25 ENCOUNTER — LAB (OUTPATIENT)
Dept: FAMILY MEDICINE CLINIC | Facility: CLINIC | Age: 79
End: 2019-06-25

## 2019-06-25 VITALS
SYSTOLIC BLOOD PRESSURE: 137 MMHG | BODY MASS INDEX: 28.51 KG/M2 | WEIGHT: 167 LBS | OXYGEN SATURATION: 91 % | DIASTOLIC BLOOD PRESSURE: 85 MMHG | HEART RATE: 79 BPM | HEIGHT: 64 IN

## 2019-06-25 DIAGNOSIS — D50.9 IRON DEFICIENCY ANEMIA, UNSPECIFIED IRON DEFICIENCY ANEMIA TYPE: Primary | ICD-10-CM

## 2019-06-25 DIAGNOSIS — D50.9 IRON DEFICIENCY ANEMIA, UNSPECIFIED IRON DEFICIENCY ANEMIA TYPE: ICD-10-CM

## 2019-06-25 DIAGNOSIS — E03.9 HYPOTHYROIDISM, UNSPECIFIED TYPE: ICD-10-CM

## 2019-06-25 PROBLEM — M25.519 SHOULDER PAIN: Status: ACTIVE | Noted: 2017-01-17

## 2019-06-25 PROBLEM — N17.9 ACUTE RENAL FAILURE: Status: ACTIVE | Noted: 2017-08-24

## 2019-06-25 PROBLEM — H61.22 IMPACTED CERUMEN OF LEFT EAR: Status: ACTIVE | Noted: 2017-08-07

## 2019-06-25 PROBLEM — G47.00 INSOMNIA: Status: ACTIVE | Noted: 2018-02-01

## 2019-06-25 PROBLEM — R92.8 ABNORMAL MAMMOGRAM: Status: ACTIVE | Noted: 2018-12-28

## 2019-06-25 PROBLEM — F32.A DEPRESSION: Status: ACTIVE | Noted: 2019-06-25

## 2019-06-25 PROBLEM — K82.9 GALLBLADDER DISORDER: Status: ACTIVE | Noted: 2017-04-28

## 2019-06-25 PROBLEM — C91.10 CHRONIC LYMPHOID LEUKEMIA: Status: ACTIVE | Noted: 2017-09-21

## 2019-06-25 PROBLEM — F41.9 ANXIETY DISORDER: Status: ACTIVE | Noted: 2019-06-25

## 2019-06-25 PROBLEM — E78.5 HYPERLIPIDEMIA: Status: ACTIVE | Noted: 2019-06-25

## 2019-06-25 PROBLEM — A31.0 MYCOBACTERIUM AVIUM INFECTION: Status: ACTIVE | Noted: 2019-03-21

## 2019-06-25 PROBLEM — Z78.0 POSTMENOPAUSAL STATUS: Status: ACTIVE | Noted: 2017-08-07

## 2019-06-25 PROBLEM — R35.0 INCREASED FREQUENCY OF URINATION: Status: ACTIVE | Noted: 2017-08-07

## 2019-06-25 PROBLEM — I10 HYPERTENSION: Status: ACTIVE | Noted: 2019-06-25

## 2019-06-25 PROBLEM — M19.90 ARTHRITIS: Status: ACTIVE | Noted: 2019-06-25

## 2019-06-25 PROBLEM — C50.919 BREAST CANCER: Status: ACTIVE | Noted: 2019-01-16

## 2019-06-25 LAB
BASOPHILS # BLD MANUAL: 0.13 10*3/MM3 (ref 0–0.2)
BASOPHILS NFR BLD AUTO: 1 % (ref 0–1.5)
DEPRECATED RDW RBC AUTO: 47.7 FL (ref 37–54)
EOSINOPHIL # BLD MANUAL: 0.13 10*3/MM3 (ref 0–0.4)
EOSINOPHIL NFR BLD MANUAL: 1 % (ref 0.3–6.2)
ERYTHROCYTE [DISTWIDTH] IN BLOOD BY AUTOMATED COUNT: 16.6 % (ref 12.3–15.4)
GIANT PLATELETS: ABNORMAL
HCT VFR BLD AUTO: 36.4 % (ref 34–46.6)
HGB BLD-MCNC: 11.7 G/DL (ref 12–15.9)
IRON 24H UR-MRATE: 36 MCG/DL (ref 28–170)
IRON SATN MFR SERPL: 9 % (ref 15–50)
LYMPHOCYTES # BLD MANUAL: 7.85 10*3/MM3 (ref 0.7–3.1)
LYMPHOCYTES NFR BLD MANUAL: 3 % (ref 5–12)
LYMPHOCYTES NFR BLD MANUAL: 59 % (ref 19.6–45.3)
MCH RBC QN AUTO: 26.3 PG (ref 26.6–33)
MCHC RBC AUTO-ENTMCNC: 32.1 G/DL (ref 31.5–35.7)
MCV RBC AUTO: 82 FL (ref 79–97)
MONOCYTES # BLD AUTO: 0.4 10*3/MM3 (ref 0.1–0.9)
NEUTROPHILS # BLD AUTO: 4.79 10*3/MM3 (ref 1.7–7)
NEUTROPHILS NFR BLD MANUAL: 33 % (ref 42.7–76)
NEUTS BAND NFR BLD MANUAL: 3 % (ref 0–5)
PATHOLOGY REVIEW: YES
PLATELET # BLD AUTO: 192 10*3/MM3 (ref 140–450)
PMV BLD AUTO: 10 FL (ref 6–12)
RBC # BLD AUTO: 4.44 10*6/MM3 (ref 3.77–5.28)
RBC MORPH BLD: NORMAL
SCAN SLIDE: NORMAL
TIBC SERPL-MCNC: 382 MCG/DL (ref 228–428)
TOXIC GRANULATION: ABNORMAL
TRANSFERRIN SERPL-MCNC: 273 MG/DL (ref 190–380)
WBC NRBC COR # BLD: 13.3 10*3/MM3 (ref 3.4–10.8)

## 2019-06-25 PROCEDURE — 83540 ASSAY OF IRON: CPT | Performed by: FAMILY MEDICINE

## 2019-06-25 PROCEDURE — 99213 OFFICE O/P EST LOW 20 MIN: CPT | Performed by: FAMILY MEDICINE

## 2019-06-25 PROCEDURE — 85007 BL SMEAR W/DIFF WBC COUNT: CPT | Performed by: FAMILY MEDICINE

## 2019-06-25 PROCEDURE — 85025 COMPLETE CBC W/AUTO DIFF WBC: CPT | Performed by: FAMILY MEDICINE

## 2019-06-25 PROCEDURE — 36415 COLL VENOUS BLD VENIPUNCTURE: CPT | Performed by: FAMILY MEDICINE

## 2019-06-25 PROCEDURE — 84466 ASSAY OF TRANSFERRIN: CPT | Performed by: FAMILY MEDICINE

## 2019-06-25 RX ORDER — SERTRALINE HYDROCHLORIDE 100 MG/1
TABLET, FILM COATED ORAL EVERY 24 HOURS
COMMUNITY
Start: 2017-10-20 | End: 2019-09-13 | Stop reason: SDUPTHER

## 2019-06-25 RX ORDER — ETHAMBUTOL HYDROCHLORIDE 400 MG/1
400 TABLET, FILM COATED ORAL 3 TIMES WEEKLY
Refills: 0 | COMMUNITY
Start: 2019-03-22 | End: 2020-02-14

## 2019-06-25 RX ORDER — HYDROCODONE BITARTRATE AND ACETAMINOPHEN 5; 325 MG/1; MG/1
1 TABLET ORAL EVERY 4 HOURS PRN
Refills: 0 | COMMUNITY
Start: 2019-03-18 | End: 2020-01-03

## 2019-06-25 RX ORDER — SUCRALFATE 1 G/1
TABLET ORAL
COMMUNITY
Start: 2019-03-19 | End: 2019-11-25 | Stop reason: SDUPTHER

## 2019-06-25 RX ORDER — RIFAMPIN 300 MG/1
CAPSULE ORAL
COMMUNITY
Start: 2019-03-19 | End: 2020-02-04

## 2019-06-25 RX ORDER — ANASTROZOLE 1 MG/1
1 TABLET ORAL DAILY
Refills: 3 | COMMUNITY
Start: 2019-03-19 | End: 2020-02-14 | Stop reason: SDUPTHER

## 2019-06-25 RX ORDER — DIAZEPAM 2 MG/1
2 TABLET ORAL EVERY 6 HOURS PRN
Refills: 0 | COMMUNITY
Start: 2019-05-13 | End: 2019-07-31 | Stop reason: SDUPTHER

## 2019-06-25 RX ORDER — METFORMIN HYDROCHLORIDE 500 MG/1
500 TABLET, EXTENDED RELEASE ORAL DAILY
Refills: 1 | COMMUNITY
Start: 2019-05-09 | End: 2019-11-20 | Stop reason: SDUPTHER

## 2019-06-25 RX ORDER — AMLODIPINE BESYLATE 10 MG/1
10 TABLET ORAL DAILY
Refills: 1 | COMMUNITY
Start: 2019-05-09 | End: 2019-07-31 | Stop reason: SDUPTHER

## 2019-06-25 RX ORDER — LEVOTHYROXINE SODIUM 0.07 MG/1
TABLET ORAL EVERY 24 HOURS
COMMUNITY
Start: 2018-11-21 | End: 2019-06-25 | Stop reason: SDUPTHER

## 2019-06-25 RX ORDER — MIRTAZAPINE 15 MG/1
TABLET, FILM COATED ORAL EVERY 24 HOURS
COMMUNITY
Start: 2019-03-19 | End: 2020-01-03 | Stop reason: SDUPTHER

## 2019-06-25 RX ORDER — DOCUSATE SODIUM 100 MG/1
100 CAPSULE, LIQUID FILLED ORAL 2 TIMES DAILY
Refills: 3 | COMMUNITY
Start: 2019-03-19

## 2019-06-25 RX ORDER — ALBUTEROL SULFATE 2.5 MG/3ML
SOLUTION RESPIRATORY (INHALATION)
COMMUNITY
Start: 2019-03-19 | End: 2020-02-06

## 2019-06-25 RX ORDER — METOCLOPRAMIDE 5 MG/1
TABLET ORAL
COMMUNITY
Start: 2016-07-18 | End: 2019-11-18 | Stop reason: SDUPTHER

## 2019-06-25 RX ORDER — IPRATROPIUM BROMIDE AND ALBUTEROL SULFATE 2.5; .5 MG/3ML; MG/3ML
SOLUTION RESPIRATORY (INHALATION)
Refills: 5 | COMMUNITY
Start: 2019-03-18 | End: 2020-02-06

## 2019-06-25 RX ORDER — AZITHROMYCIN 250 MG/1
TABLET, FILM COATED ORAL SEE ADMIN INSTRUCTIONS
Refills: 0 | COMMUNITY
Start: 2019-03-19 | End: 2020-02-04

## 2019-06-25 RX ORDER — LOSARTAN POTASSIUM 100 MG/1
100 TABLET ORAL DAILY
Refills: 3 | COMMUNITY
Start: 2019-04-04 | End: 2019-07-31 | Stop reason: SDUPTHER

## 2019-06-25 RX ORDER — LEVOTHYROXINE SODIUM 0.07 MG/1
75 TABLET ORAL EVERY 24 HOURS
Qty: 60 TABLET | Refills: 0 | Status: SHIPPED | OUTPATIENT
Start: 2019-06-25 | End: 2019-12-23 | Stop reason: SDUPTHER

## 2019-06-25 RX ORDER — PANTOPRAZOLE SODIUM 40 MG/1
TABLET, DELAYED RELEASE ORAL EVERY 24 HOURS
COMMUNITY
Start: 2019-03-19 | End: 2020-01-03 | Stop reason: SDUPTHER

## 2019-06-25 NOTE — PROGRESS NOTES
Susana Guan is a 79 y.o. female.     HPI     Here for 6 mo f/u-cholesterol/htn  Feeling better    Out of thyroid meds x few months  On mac tx see draw next month, maybe hiatal herniavsurgery- sept    Taking nerve pill once a week jittery and can't sleep  Minimal cough, intermitten spells    Pain meds once a week    Wonder about stopping iron pill/colace    Past Medical History:   Diagnosis Date   • Acid reflux    • Anxiety disorder    • Arthritis    • Breast cancer (CMS/HCC)    • Depression    • H/O mammogram 08/2017   • Hemorrhoid    • Hyperlipidemia    • Hypertension      Past Surgical History:   Procedure Laterality Date   • BLADDER SURGERY     • CYST REMOVAL      Removed a fatty cyst off of her back , abstraction from [facility].    • MASTECTOMY Right    • TUBAL ABDOMINAL LIGATION       Family History   Problem Relation Age of Onset   • Diabetes Mother    • Arthritis Other    • Heart disease Other      Social History     Tobacco Use   • Smoking status: Former Smoker   • Smokeless tobacco: Never Used   Substance Use Topics   • Alcohol use: No     Frequency: Never       Current Outpatient Medications on File Prior to Visit   Medication Sig Dispense Refill   • albuterol (PROVENTIL) (2.5 MG/3ML) 0.083% nebulizer solution ALBUTEROL SULFATE (2.5 MG/3ML) 0.083% NEBU     • amLODIPine (NORVASC) 10 MG tablet Take 10 mg by mouth Daily.  1   • azithromycin (ZITHROMAX) 250 MG tablet Take  by mouth See Admin Instructions. Take 1 tablet by mouth on Monday, Wednesday, and Friday  0   • diazePAM (VALIUM) 2 MG tablet Take 2 mg by mouth Every 6 (Six) Hours As Needed.  0   • docusate sodium (COLACE) 100 MG capsule Take 100 mg by mouth 2 (Two) Times a Day.  3   • ethambutol (MYAMBUTOL) 400 MG tablet TAKE 1 TABLET BY MOUTH ON MONDAY, WEDNESDAYS AND FRIDAYS  0   • ipratropium-albuterol (DUO-NEB) 0.5-2.5 mg/3 ml nebulizer INHALE 1 VIAL EVERY 6 HOURS AS NEEDED  5   • losartan (COZAAR) 100 MG tablet Take 100 mg by mouth  "Daily.  3   • metFORMIN ER (GLUCOPHAGE-XR) 500 MG 24 hr tablet Take 500 mg by mouth Daily.  1   • metoclopramide (REGLAN) 5 MG tablet METOCLOPRAMIDE HCL 5 MG TABS     • mirtazapine (REMERON) 15 MG tablet Daily.     • pantoprazole (PROTONIX) 40 MG EC tablet Daily.     • rifAMPin (RIFADIN) 300 MG capsule RIFAMPIN 300 MG CAPS     • sertraline (ZOLOFT) 100 MG tablet Daily.     • sucralfate (CARAFATE) 1 g tablet CARAFATE 1 GM TABS     • [DISCONTINUED] levothyroxine (SYNTHROID, LEVOTHROID) 75 MCG tablet Daily.     • anastrozole (ARIMIDEX) 1 MG tablet Take 1 mg by mouth Daily.  3   • HYDROcodone-acetaminophen (NORCO) 5-325 MG per tablet Take 1 tablet by mouth Every 4 (Four) Hours As Needed.  0     No current facility-administered medications on file prior to visit.            Review of Systems   Respiratory: Negative for shortness of breath.    Cardiovascular: Negative for chest pain.   Gastrointestinal: Negative for constipation and diarrhea.   Genitourinary: Negative for dysuria.       Visit Vitals  /85 (BP Location: Left arm, Patient Position: Sitting, Cuff Size: Adult)   Pulse 79   Ht 162.6 cm (64\")   Wt 75.8 kg (167 lb)   SpO2 91%   BMI 28.67 kg/m²       Objective   Physical Exam   Constitutional: She is oriented to person, place, and time. She appears well-developed and well-nourished.   HENT:   Head: Normocephalic and atraumatic.   Cardiovascular: Normal rate, regular rhythm and normal heart sounds.   Pulmonary/Chest:   Rhonchi LLL   Neurological: She is alert and oriented to person, place, and time.   Psychiatric: She has a normal mood and affect. Her behavior is normal. Judgment and thought content normal.   Vitals reviewed.        Diagnoses and all orders for this visit:    1. Iron deficiency anemia, unspecified iron deficiency anemia type (Primary)  -     Iron and TIBC; Future  -     CBC & Differential  -     CBC Auto Differential    2. Hypothyroidism, unspecified type  Comments:  restart med, check lab in " 6 wks  Orders:  -     TSH; Future    Other orders  -     levothyroxine (SYNTHROID, LEVOTHROID) 75 MCG tablet; Take 1 tablet by mouth Daily.  Dispense: 60 tablet; Refill: 0

## 2019-06-26 LAB
LAB AP CASE REPORT: NORMAL
PATH REPORT.FINAL DX SPEC: NORMAL

## 2019-06-27 ENCOUNTER — TELEPHONE (OUTPATIENT)
Dept: FAMILY MEDICINE CLINIC | Facility: CLINIC | Age: 79
End: 2019-06-27

## 2019-06-27 NOTE — TELEPHONE ENCOUNTER
Hemoglobin is improved, at 11, but iron level is still a little low, continue iron for 1 more month

## 2019-08-01 RX ORDER — DIAZEPAM 2 MG/1
TABLET ORAL
Qty: 30 TABLET | Refills: 0 | Status: SHIPPED | OUTPATIENT
Start: 2019-08-01 | End: 2020-02-06

## 2019-08-01 RX ORDER — TOLTERODINE TARTRATE 2 MG/1
TABLET, EXTENDED RELEASE ORAL
Qty: 180 TABLET | Refills: 1 | Status: SHIPPED | OUTPATIENT
Start: 2019-08-01 | End: 2019-08-28 | Stop reason: SDUPTHER

## 2019-08-01 RX ORDER — LOSARTAN POTASSIUM 100 MG/1
TABLET ORAL
Qty: 90 TABLET | Refills: 3 | Status: SHIPPED | OUTPATIENT
Start: 2019-08-01 | End: 2020-01-03 | Stop reason: DRUGHIGH

## 2019-08-01 RX ORDER — AMLODIPINE BESYLATE 10 MG/1
TABLET ORAL
Qty: 90 TABLET | Refills: 1 | Status: SHIPPED | OUTPATIENT
Start: 2019-08-01 | End: 2020-01-03 | Stop reason: SDUPTHER

## 2019-08-14 ENCOUNTER — LAB (OUTPATIENT)
Dept: FAMILY MEDICINE CLINIC | Facility: CLINIC | Age: 79
End: 2019-08-14

## 2019-08-14 DIAGNOSIS — E03.9 HYPOTHYROIDISM, UNSPECIFIED TYPE: ICD-10-CM

## 2019-08-14 LAB — TSH SERPL DL<=0.05 MIU/L-ACNC: 2.84 MIU/ML (ref 0.34–5.6)

## 2019-08-14 PROCEDURE — 84443 ASSAY THYROID STIM HORMONE: CPT | Performed by: FAMILY MEDICINE

## 2019-08-14 PROCEDURE — 36415 COLL VENOUS BLD VENIPUNCTURE: CPT

## 2019-08-28 RX ORDER — TOLTERODINE TARTRATE 2 MG/1
TABLET, EXTENDED RELEASE ORAL
Qty: 180 TABLET | Refills: 1 | Status: SHIPPED | OUTPATIENT
Start: 2019-08-28 | End: 2020-01-03 | Stop reason: SDUPTHER

## 2019-09-12 ENCOUNTER — ANESTHESIA EVENT (OUTPATIENT)
Dept: GASTROENTEROLOGY | Facility: HOSPITAL | Age: 79
End: 2019-09-12

## 2019-09-13 ENCOUNTER — HOSPITAL ENCOUNTER (OUTPATIENT)
Facility: HOSPITAL | Age: 79
Setting detail: HOSPITAL OUTPATIENT SURGERY
Discharge: HOME | End: 2019-09-13
Attending: INTERNAL MEDICINE | Admitting: INTERNAL MEDICINE

## 2019-09-13 ENCOUNTER — ANESTHESIA (OUTPATIENT)
Dept: GASTROENTEROLOGY | Facility: HOSPITAL | Age: 79
End: 2019-09-13

## 2019-09-13 VITALS
WEIGHT: 177.47 LBS | RESPIRATION RATE: 12 BRPM | HEIGHT: 63 IN | HEART RATE: 69 BPM | TEMPERATURE: 97.9 F | SYSTOLIC BLOOD PRESSURE: 161 MMHG | OXYGEN SATURATION: 100 % | BODY MASS INDEX: 31.45 KG/M2 | DIASTOLIC BLOOD PRESSURE: 76 MMHG

## 2019-09-13 VITALS
DIASTOLIC BLOOD PRESSURE: 84 MMHG | RESPIRATION RATE: 5 BRPM | SYSTOLIC BLOOD PRESSURE: 171 MMHG | OXYGEN SATURATION: 100 % | HEART RATE: 75 BPM

## 2019-09-13 DIAGNOSIS — A31.0 MYCOBACTERIUM AVIUM COMPLEX (HCC): ICD-10-CM

## 2019-09-13 LAB
B PERT DNA SPEC QL NAA+PROBE: NOT DETECTED
C PNEUM DNA NPH QL NAA+NON-PROBE: NOT DETECTED
FLUAV H1 2009 PAND RNA NPH QL NAA+PROBE: NOT DETECTED
FLUAV H1 HA GENE NPH QL NAA+PROBE: NOT DETECTED
FLUAV H3 RNA NPH QL NAA+PROBE: NOT DETECTED
FLUAV SUBTYP SPEC NAA+PROBE: NOT DETECTED
FLUBV RNA ISLT QL NAA+PROBE: NOT DETECTED
GLUCOSE BLDC GLUCOMTR-MCNC: 99 MG/DL (ref 70–105)
HADV DNA SPEC NAA+PROBE: NOT DETECTED
HCOV 229E RNA SPEC QL NAA+PROBE: NOT DETECTED
HCOV HKU1 RNA SPEC QL NAA+PROBE: NOT DETECTED
HCOV NL63 RNA SPEC QL NAA+PROBE: NOT DETECTED
HCOV OC43 RNA SPEC QL NAA+PROBE: NOT DETECTED
HMPV RNA NPH QL NAA+NON-PROBE: NOT DETECTED
HPIV1 RNA SPEC QL NAA+PROBE: NOT DETECTED
HPIV2 RNA SPEC QL NAA+PROBE: NOT DETECTED
HPIV3 RNA NPH QL NAA+PROBE: NOT DETECTED
HPIV4 P GENE NPH QL NAA+PROBE: NOT DETECTED
M PNEUMO IGG SER IA-ACNC: NOT DETECTED
RHINOVIRUS RNA SPEC NAA+PROBE: NOT DETECTED
RSV RNA NPH QL NAA+NON-PROBE: NOT DETECTED

## 2019-09-13 PROCEDURE — 87205 SMEAR GRAM STAIN: CPT | Performed by: INTERNAL MEDICINE

## 2019-09-13 PROCEDURE — 87102 FUNGUS ISOLATION CULTURE: CPT | Performed by: INTERNAL MEDICINE

## 2019-09-13 PROCEDURE — 87116 MYCOBACTERIA CULTURE: CPT | Performed by: INTERNAL MEDICINE

## 2019-09-13 PROCEDURE — 87798 DETECT AGENT NOS DNA AMP: CPT | Performed by: INTERNAL MEDICINE

## 2019-09-13 PROCEDURE — 82962 GLUCOSE BLOOD TEST: CPT

## 2019-09-13 PROCEDURE — 0099U HC BIOFIRE FILMARRAY RESP PANEL 1: CPT | Performed by: INTERNAL MEDICINE

## 2019-09-13 PROCEDURE — 25010000002 PROPOFOL 10 MG/ML EMULSION: Performed by: ANESTHESIOLOGY

## 2019-09-13 PROCEDURE — 87070 CULTURE OTHR SPECIMN AEROBIC: CPT | Performed by: INTERNAL MEDICINE

## 2019-09-13 PROCEDURE — 87206 SMEAR FLUORESCENT/ACID STAI: CPT | Performed by: INTERNAL MEDICINE

## 2019-09-13 RX ORDER — SODIUM CHLORIDE 0.9 % (FLUSH) 0.9 %
3-10 SYRINGE (ML) INJECTION AS NEEDED
Status: DISCONTINUED | OUTPATIENT
Start: 2019-09-13 | End: 2019-09-13 | Stop reason: HOSPADM

## 2019-09-13 RX ORDER — LIDOCAINE HYDROCHLORIDE 10 MG/ML
INJECTION, SOLUTION EPIDURAL; INFILTRATION; INTRACAUDAL; PERINEURAL AS NEEDED
Status: DISCONTINUED | OUTPATIENT
Start: 2019-09-13 | End: 2019-09-13 | Stop reason: SURG

## 2019-09-13 RX ORDER — SODIUM CHLORIDE 9 MG/ML
9 INJECTION, SOLUTION INTRAVENOUS CONTINUOUS PRN
Status: DISCONTINUED | OUTPATIENT
Start: 2019-09-13 | End: 2019-09-13 | Stop reason: HOSPADM

## 2019-09-13 RX ORDER — SODIUM CHLORIDE 0.9 % (FLUSH) 0.9 %
3 SYRINGE (ML) INJECTION EVERY 12 HOURS SCHEDULED
Status: DISCONTINUED | OUTPATIENT
Start: 2019-09-13 | End: 2019-09-13 | Stop reason: HOSPADM

## 2019-09-13 RX ORDER — PROPOFOL 10 MG/ML
VIAL (ML) INTRAVENOUS AS NEEDED
Status: DISCONTINUED | OUTPATIENT
Start: 2019-09-13 | End: 2019-09-13 | Stop reason: SURG

## 2019-09-13 RX ORDER — LIDOCAINE HYDROCHLORIDE 20 MG/ML
INJECTION, SOLUTION INFILTRATION; PERINEURAL AS NEEDED
Status: DISCONTINUED | OUTPATIENT
Start: 2019-09-13 | End: 2019-09-13 | Stop reason: HOSPADM

## 2019-09-13 RX ORDER — PROPOFOL 10 MG/ML
VIAL (ML) INTRAVENOUS AS NEEDED
Status: DISCONTINUED | OUTPATIENT
Start: 2019-09-13 | End: 2019-09-13

## 2019-09-13 RX ADMIN — SODIUM CHLORIDE 9 ML/HR: 0.9 INJECTION, SOLUTION INTRAVENOUS at 07:53

## 2019-09-13 RX ADMIN — PROPOFOL 75 MG: 10 INJECTION, EMULSION INTRAVENOUS at 09:35

## 2019-09-13 RX ADMIN — PROPOFOL 50 MG: 10 INJECTION, EMULSION INTRAVENOUS at 09:37

## 2019-09-13 RX ADMIN — LIDOCAINE HYDROCHLORIDE 40 MG: 10 INJECTION, SOLUTION EPIDURAL; INFILTRATION; INTRACAUDAL; PERINEURAL at 09:30

## 2019-09-13 NOTE — OP NOTE
Bronchoscopy Procedure Note    Procedure:  1. 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  Patient was given the opportunity to ask questions and all concerns were answered.  The bronchocope was inserted into the main airway via the oropharynx. An anatomical survey was done of the main airways and the subsegmental bronchus to at least the first subsegmental level of all five lobes of both lungs.  The findings are reported below.  A bronchoalveolar lavage was performed using aliquots of normal saline instilled into the airways then aspirated back.    Findings:    Very mild amount of mucoid secretions  Moderate reactive airway disease  No endobronchial lesions        Patient tolerated procedure well        Estimated Blood Loss:  Minimal           Specimens:  Sent serosanguinous fluid                Complications:  None; patient tolerated the procedure well.           Disposition: PACU - hemodynamically stable.      Patient tolerated the procedure well.    Marnie Frankel MD  9/13/2019  10:20 AM

## 2019-09-13 NOTE — DISCHARGE INSTRUCTIONS
Do not drink alcohol, drive, operate any heavy machinery or power tools, or make any important/legal decisions for the next 24 hours.    Call you doctor immediately if you experience severe chest pain, shortness of breath, bleeding or coughing up blood, or fever over 101 F.    Diet: Nothing by mouth until 1145     After a bronchoscopy, you may experience a scratchy throat. This will gradually get better. You may gargle with warm salt water for this after the time noted above is over.     A responsible adult should stay with you and you should rest quietly for the rest of the day. Follow up with MD as instructed.

## 2019-09-13 NOTE — ANESTHESIA POSTPROCEDURE EVALUATION
Patient: Diane Guan    Procedure Summary     Date:  09/13/19 Room / Location:  [facility] ENDOSCOPY 3 / [facility] ENDOSCOPY    Anesthesia Start:  0927 Anesthesia Stop:  0947    Procedure:  BRONCHOSCOPY with bronchial washing (N/A Bronchus) Diagnosis:  (MAC)    Surgeon:  Marnie Frankel MD Provider:  Willard Patterson MD    Anesthesia Type:  MAC ASA Status:  3          Anesthesia Type: MAC  Last vitals  BP   168/71 (09/13/19 0730)   Temp   97.9 °F (36.6 °C) (09/13/19 0730)   Pulse   70 (09/13/19 0730)   Resp   14 (09/13/19 0730)     SpO2   93 % (09/13/19 0730)     Post Anesthesia Care and Evaluation    Patient location during evaluation: PACU  Patient participation: complete - patient participated  Level of consciousness: awake  Pain score: 0 (See nurse's notes for pain score)  Pain management: adequate  Airway patency: patent  Anesthetic complications: No anesthetic complications  PONV Status: none  Cardiovascular status: acceptable  Respiratory status: acceptable  Hydration status: acceptable    Comments: Patient seen and examined postoperatively; vital signs stable; SpO2 greater than or equal to 90%; cardiopulmonary status stable; nausea/vomiting adequately controlled; pain adequately controlled; no apparent anesthesia complications; patient discharged from anesthesia care when discharge criteria were met

## 2019-09-13 NOTE — ANESTHESIA PREPROCEDURE EVALUATION
Anesthesia Evaluation     Patient summary reviewed and Nursing notes reviewed   NPO Solid Status: > 8 hours  NPO Liquid Status: > 8 hours           Airway   Mallampati: II  TM distance: >3 FB  Neck ROM: full  No difficulty expected  Dental - normal exam   (+) partials    Pulmonary - normal exam   (+) pneumonia ,   Cardiovascular - normal exam    ECG reviewed    (+) hypertension, hyperlipidemia,       Neuro/Psych  GI/Hepatic/Renal/Endo      Musculoskeletal     Abdominal  - normal exam    Bowel sounds: normal.   Substance History      OB/GYN          Other   (+) blood dyscrasia, arthritis   history of cancer                  Anesthesia Plan    ASA 3     MAC     intravenous induction   Anesthetic plan, all risks, benefits, and alternatives have been provided, discussed and informed consent has been obtained with: patient.

## 2019-09-15 LAB
BACTERIA SPEC RESP CULT: NORMAL
GRAM STN SPEC: NORMAL

## 2019-09-15 RX ORDER — SERTRALINE HYDROCHLORIDE 100 MG/1
TABLET, FILM COATED ORAL
Qty: 90 TABLET | Refills: 1 | Status: SHIPPED | OUTPATIENT
Start: 2019-09-15 | End: 2020-01-03 | Stop reason: SDUPTHER

## 2019-09-18 LAB
SPECIMEN SOURCE: NEGATIVE
SPECIMEN SOURCE: NORMAL

## 2019-09-27 LAB — FUNGUS WND CULT: ABNORMAL

## 2019-10-25 LAB
MYCOBACTERIUM SPEC CULT: NORMAL
NIGHT BLUE STAIN TISS: NORMAL

## 2019-11-19 RX ORDER — METOCLOPRAMIDE 5 MG/1
TABLET ORAL
Qty: 120 TABLET | Refills: 0 | Status: SHIPPED | OUTPATIENT
Start: 2019-11-19 | End: 2019-12-16 | Stop reason: SDUPTHER

## 2019-11-20 RX ORDER — METFORMIN HYDROCHLORIDE 500 MG/1
500 TABLET, EXTENDED RELEASE ORAL DAILY
Qty: 90 TABLET | Refills: 1 | Status: SHIPPED | OUTPATIENT
Start: 2019-11-20 | End: 2020-01-03

## 2019-11-25 RX ORDER — SUCRALFATE 1 G/1
1 TABLET ORAL 4 TIMES DAILY
Qty: 120 TABLET | Refills: 0 | Status: SHIPPED | OUTPATIENT
Start: 2019-11-25 | End: 2019-12-16 | Stop reason: SDUPTHER

## 2019-11-25 NOTE — TELEPHONE ENCOUNTER
Pt daughter called for her.   Pt has been out of her carafate x1week.  Pt takes it 4 times daily  She has an apt with you 12/23/19

## 2019-12-17 RX ORDER — SUCRALFATE 1 G/1
1 TABLET ORAL 4 TIMES DAILY
Qty: 120 TABLET | Refills: 0 | Status: SHIPPED | OUTPATIENT
Start: 2019-12-17 | End: 2020-01-03 | Stop reason: SDUPTHER

## 2019-12-17 RX ORDER — METOCLOPRAMIDE 5 MG/1
TABLET ORAL
Qty: 120 TABLET | Refills: 0 | Status: SHIPPED | OUTPATIENT
Start: 2019-12-17 | End: 2020-01-03 | Stop reason: DRUGHIGH

## 2019-12-23 RX ORDER — LEVOTHYROXINE SODIUM 0.07 MG/1
75 TABLET ORAL
Qty: 30 TABLET | Refills: 5 | Status: SHIPPED | OUTPATIENT
Start: 2019-12-23 | End: 2020-01-06 | Stop reason: SDUPTHER

## 2019-12-23 NOTE — TELEPHONE ENCOUNTER
Refill of levothyroxine 75 mcg sent to patient's pharmacy.  Last TSH in August 2019 was within normal limits.    Signature    Jemima Fontaine MD  [facility]        This document has been electronically signed by Jemima Fontaine MD on December 23, 2019 4:59 PM

## 2020-01-03 ENCOUNTER — OFFICE VISIT (OUTPATIENT)
Dept: FAMILY MEDICINE CLINIC | Facility: CLINIC | Age: 80
End: 2020-01-03

## 2020-01-03 VITALS
DIASTOLIC BLOOD PRESSURE: 77 MMHG | SYSTOLIC BLOOD PRESSURE: 163 MMHG | HEART RATE: 81 BPM | TEMPERATURE: 98.2 F | WEIGHT: 184.6 LBS | OXYGEN SATURATION: 92 % | HEIGHT: 65 IN | BODY MASS INDEX: 30.75 KG/M2

## 2020-01-03 DIAGNOSIS — E66.09 CLASS 1 OBESITY DUE TO EXCESS CALORIES WITH SERIOUS COMORBIDITY AND BODY MASS INDEX (BMI) OF 30.0 TO 30.9 IN ADULT: ICD-10-CM

## 2020-01-03 DIAGNOSIS — E78.2 MIXED HYPERLIPIDEMIA: ICD-10-CM

## 2020-01-03 DIAGNOSIS — A31.0 PULMONARY MYCOBACTERIUM AVIUM COMPLEX (MAC) INFECTION (HCC): ICD-10-CM

## 2020-01-03 DIAGNOSIS — K44.9 HIATAL HERNIA WITH GERD: ICD-10-CM

## 2020-01-03 DIAGNOSIS — E11.9 TYPE 2 DIABETES MELLITUS WITHOUT COMPLICATION, WITHOUT LONG-TERM CURRENT USE OF INSULIN (HCC): ICD-10-CM

## 2020-01-03 DIAGNOSIS — E03.9 ACQUIRED HYPOTHYROIDISM: ICD-10-CM

## 2020-01-03 DIAGNOSIS — I10 ESSENTIAL HYPERTENSION: Primary | ICD-10-CM

## 2020-01-03 DIAGNOSIS — K21.9 HIATAL HERNIA WITH GERD: ICD-10-CM

## 2020-01-03 LAB
ALBUMIN SERPL-MCNC: 4.4 G/DL (ref 3.5–5.2)
ALBUMIN UR-MCNC: 5.3 MG/DL
ALBUMIN/GLOB SERPL: 1.6 G/DL
ALP SERPL-CCNC: 127 U/L (ref 39–117)
ALT SERPL W P-5'-P-CCNC: 13 U/L (ref 1–33)
ANION GAP SERPL CALCULATED.3IONS-SCNC: 14.9 MMOL/L (ref 5–15)
AST SERPL-CCNC: 17 U/L (ref 1–32)
BASOPHILS # BLD MANUAL: 0.39 10*3/MM3 (ref 0–0.2)
BASOPHILS NFR BLD AUTO: 2 % (ref 0–1.5)
BILIRUB SERPL-MCNC: 0.4 MG/DL (ref 0.2–1.2)
BUN BLD-MCNC: 25 MG/DL (ref 8–23)
BUN/CREAT SERPL: 25.3 (ref 7–25)
CALCIUM SPEC-SCNC: 10.1 MG/DL (ref 8.6–10.5)
CHLORIDE SERPL-SCNC: 104 MMOL/L (ref 98–107)
CHOLEST SERPL-MCNC: 374 MG/DL (ref 0–200)
CO2 SERPL-SCNC: 22.1 MMOL/L (ref 22–29)
CREAT BLD-MCNC: 0.99 MG/DL (ref 0.57–1)
CREAT UR-MCNC: 83.4 MG/DL
DEPRECATED RDW RBC AUTO: 42.1 FL (ref 37–54)
EOSINOPHIL # BLD MANUAL: 0.58 10*3/MM3 (ref 0–0.4)
EOSINOPHIL NFR BLD MANUAL: 3 % (ref 0.3–6.2)
ERYTHROCYTE [DISTWIDTH] IN BLOOD BY AUTOMATED COUNT: 14.3 % (ref 12.3–15.4)
GFR SERPL CREATININE-BSD FRML MDRD: 54 ML/MIN/1.73
GLOBULIN UR ELPH-MCNC: 2.8 GM/DL
GLUCOSE BLD-MCNC: 88 MG/DL (ref 65–99)
HBA1C MFR BLD: 5.9 % (ref 3.5–5.6)
HCT VFR BLD AUTO: 36.7 % (ref 34–46.6)
HDLC SERPL-MCNC: 50 MG/DL (ref 40–60)
HGB BLD-MCNC: 12.1 G/DL (ref 12–15.9)
LDLC SERPL CALC-MCNC: 262 MG/DL (ref 0–100)
LDLC/HDLC SERPL: 5.24 {RATIO}
LYMPHOCYTES # BLD MANUAL: 11.61 10*3/MM3 (ref 0.7–3.1)
LYMPHOCYTES NFR BLD MANUAL: 2 % (ref 5–12)
LYMPHOCYTES NFR BLD MANUAL: 59.6 % (ref 19.6–45.3)
MCH RBC QN AUTO: 27.1 PG (ref 26.6–33)
MCHC RBC AUTO-ENTMCNC: 33 G/DL (ref 31.5–35.7)
MCV RBC AUTO: 82.1 FL (ref 79–97)
MICROALBUMIN/CREAT UR: 63.5 MG/G
MONOCYTES # BLD AUTO: 0.39 10*3/MM3 (ref 0.1–0.9)
NEUTROPHILS # BLD AUTO: 6.1 10*3/MM3 (ref 1.7–7)
NEUTROPHILS NFR BLD MANUAL: 31.3 % (ref 42.7–76)
PLAT MORPH BLD: NORMAL
PLATELET # BLD AUTO: 215 10*3/MM3 (ref 140–450)
PMV BLD AUTO: 12.4 FL (ref 6–12)
POTASSIUM BLD-SCNC: 5.1 MMOL/L (ref 3.5–5.2)
PROT SERPL-MCNC: 7.2 G/DL (ref 6–8.5)
RBC # BLD AUTO: 4.47 10*6/MM3 (ref 3.77–5.28)
RBC MORPH BLD: NORMAL
SMUDGE CELLS BLD QL SMEAR: ABNORMAL
SODIUM BLD-SCNC: 141 MMOL/L (ref 136–145)
TRIGL SERPL-MCNC: 311 MG/DL (ref 0–150)
TSH SERPL DL<=0.05 MIU/L-ACNC: 4.62 UIU/ML (ref 0.27–4.2)
VARIANT LYMPHS NFR BLD MANUAL: 2 % (ref 0–5)
VLDLC SERPL-MCNC: 62.2 MG/DL (ref 5–40)
WBC NRBC COR # BLD: 19.48 10*3/MM3 (ref 3.4–10.8)

## 2020-01-03 PROCEDURE — 99214 OFFICE O/P EST MOD 30 MIN: CPT | Performed by: FAMILY MEDICINE

## 2020-01-03 PROCEDURE — 82570 ASSAY OF URINE CREATININE: CPT | Performed by: FAMILY MEDICINE

## 2020-01-03 PROCEDURE — 36415 COLL VENOUS BLD VENIPUNCTURE: CPT | Performed by: FAMILY MEDICINE

## 2020-01-03 PROCEDURE — 80053 COMPREHEN METABOLIC PANEL: CPT | Performed by: FAMILY MEDICINE

## 2020-01-03 PROCEDURE — 83036 HEMOGLOBIN GLYCOSYLATED A1C: CPT | Performed by: FAMILY MEDICINE

## 2020-01-03 PROCEDURE — 85007 BL SMEAR W/DIFF WBC COUNT: CPT | Performed by: FAMILY MEDICINE

## 2020-01-03 PROCEDURE — 84443 ASSAY THYROID STIM HORMONE: CPT | Performed by: FAMILY MEDICINE

## 2020-01-03 PROCEDURE — 85025 COMPLETE CBC W/AUTO DIFF WBC: CPT | Performed by: FAMILY MEDICINE

## 2020-01-03 PROCEDURE — 82043 UR ALBUMIN QUANTITATIVE: CPT | Performed by: FAMILY MEDICINE

## 2020-01-03 PROCEDURE — 80061 LIPID PANEL: CPT | Performed by: FAMILY MEDICINE

## 2020-01-03 RX ORDER — AMLODIPINE BESYLATE 10 MG/1
10 TABLET ORAL
Qty: 90 TABLET | Refills: 1 | Status: SHIPPED | OUTPATIENT
Start: 2020-01-03 | End: 2020-05-15 | Stop reason: SDUPTHER

## 2020-01-03 RX ORDER — MIRTAZAPINE 15 MG/1
15 TABLET, FILM COATED ORAL NIGHTLY
Qty: 90 TABLET | Refills: 1 | Status: SHIPPED | OUTPATIENT
Start: 2020-01-03 | End: 2020-02-14

## 2020-01-03 RX ORDER — SUCRALFATE 1 G/1
1 TABLET ORAL
Qty: 360 TABLET | Refills: 1 | Status: SHIPPED | OUTPATIENT
Start: 2020-01-03 | End: 2020-05-15 | Stop reason: SDUPTHER

## 2020-01-03 RX ORDER — ROSUVASTATIN CALCIUM 10 MG/1
10 TABLET, COATED ORAL NIGHTLY
Qty: 90 TABLET | Refills: 1 | Status: SHIPPED | OUTPATIENT
Start: 2020-01-03 | End: 2020-05-15 | Stop reason: SDUPTHER

## 2020-01-03 RX ORDER — TOLTERODINE TARTRATE 2 MG/1
2 TABLET, EXTENDED RELEASE ORAL 2 TIMES DAILY
Qty: 180 TABLET | Refills: 1 | Status: SHIPPED | OUTPATIENT
Start: 2020-01-03 | End: 2020-05-15 | Stop reason: SDUPTHER

## 2020-01-03 RX ORDER — PANTOPRAZOLE SODIUM 40 MG/1
40 TABLET, DELAYED RELEASE ORAL
Qty: 90 TABLET | Refills: 1 | Status: SHIPPED | OUTPATIENT
Start: 2020-01-03 | End: 2020-05-15 | Stop reason: SDUPTHER

## 2020-01-03 RX ORDER — METOCLOPRAMIDE 10 MG/1
10 TABLET ORAL EVERY MORNING
COMMUNITY
Start: 2019-12-16 | End: 2020-02-14 | Stop reason: SDUPTHER

## 2020-01-03 RX ORDER — LOSARTAN POTASSIUM AND HYDROCHLOROTHIAZIDE 25; 100 MG/1; MG/1
1 TABLET ORAL DAILY
Qty: 90 TABLET | Refills: 1 | Status: SHIPPED | OUTPATIENT
Start: 2020-01-03 | End: 2020-01-20 | Stop reason: RX

## 2020-01-03 RX ORDER — SERTRALINE HYDROCHLORIDE 100 MG/1
100 TABLET, FILM COATED ORAL NIGHTLY
Qty: 90 TABLET | Refills: 1 | Status: SHIPPED | OUTPATIENT
Start: 2020-01-03 | End: 2020-05-15 | Stop reason: SDUPTHER

## 2020-01-03 NOTE — PATIENT INSTRUCTIONS
Preventive Care 65 Years and Older, Female  Preventive care refers to lifestyle choices and visits with your health care provider that can promote health and wellness.  What does preventive care include?  · A yearly physical exam. This is also called an annual well check.  · Dental exams once or twice a year.  · Routine eye exams. Ask your health care provider how often you should have your eyes checked.  · Personal lifestyle choices, including:  ? Daily care of your teeth and gums.  ? Regular physical activity.  ? Eating a healthy diet.  ? Avoiding tobacco and drug use.  ? Limiting alcohol use.  ? Practicing safe sex.  ? Taking low-dose aspirin every day.  ? Taking vitamin and mineral supplements as recommended by your health care provider.  What happens during an annual well check?  The services and screenings done by your health care provider during your annual well check will depend on your age, overall health, lifestyle risk factors, and family history of disease.  Counseling  Your health care provider may ask you questions about your:  · Alcohol use.  · Tobacco use.  · Drug use.  · Emotional well-being.  · Home and relationship well-being.  · Sexual activity.  · Eating habits.  · History of falls.  · Memory and ability to understand (cognition).  · Work and work environment.  · Reproductive health.    Screening  You may have the following tests or measurements:  · Height, weight, and BMI.  · Blood pressure.  · Lipid and cholesterol levels. These may be checked every 5 years, or more frequently if you are over 50 years old.  · Skin check.  · Lung cancer screening. You may have this screening every year starting at age 55 if you have a 30-pack-year history of smoking and currently smoke or have quit within the past 15 years.  · Colorectal cancer screening. All adults should have this screening starting at age 50 and continuing until age 75. You will have tests every 1-10 years, depending on your results and the  type of screening test. People at increased risk should start screening at an earlier age. Screening tests may include:  ? Guaiac-based fecal occult blood testing.  ? Fecal immunochemical test (FIT).  ? Stool DNA test.  ? Virtual colonoscopy.  ? Sigmoidoscopy. During this test, a flexible tube with a tiny camera (sigmoidoscope) is used to examine your rectum and lower colon. The sigmoidoscope is inserted through your anus into your rectum and lower colon.  ? Colonoscopy. During this test, a long, thin, flexible tube with a tiny camera (colonoscope) is used to examine your entire colon and rectum.  · Hepatitis C blood test.  · Hepatitis B blood test.  · Sexually transmitted disease (STD) testing.  · Diabetes screening. This is done by checking your blood sugar (glucose) after you have not eaten for a while (fasting). You may have this done every 1-3 years.  · Bone density scan. This is done to screen for osteoporosis. You may have this done starting at age 65.  · Mammogram. This may be done every 1-2 years. Talk to your health care provider about how often you should have regular mammograms.  Talk with your health care provider about your test results, treatment options, and if necessary, the need for more tests.  Vaccines  Your health care provider may recommend certain vaccines, such as:  · Influenza vaccine. This is recommended every year.  · Tetanus, diphtheria, and acellular pertussis (Tdap, Td) vaccine. You may need a Td booster every 10 years.  · Varicella vaccine. You may need this if you have not been vaccinated.  · Zoster vaccine. You may need this after age 60.  · Measles, mumps, and rubella (MMR) vaccine. You may need at least one dose of MMR if you were born in 1957 or later. You may also need a second dose.  · Pneumococcal 13-valent conjugate (PCV13) vaccine. One dose is recommended after age 65.  · Pneumococcal polysaccharide (PPSV23) vaccine. One dose is recommended after age 65.  · Meningococcal  vaccine. You may need this if you have certain conditions.  · Hepatitis A vaccine. You may need this if you have certain conditions or if you travel or work in places where you may be exposed to hepatitis A.  · Hepatitis B vaccine. You may need this if you have certain conditions or if you travel or work in places where you may be exposed to hepatitis B.  · Haemophilus influenzae type b (Hib) vaccine. You may need this if you have certain conditions.  Talk to your health care provider about which screenings and vaccines you need and how often you need them.  This information is not intended to replace advice given to you by your health care provider. Make sure you discuss any questions you have with your health care provider.  Document Released: 01/13/2017 Document Revised: 02/07/2019 Document Reviewed: 10/18/2016  [employer] Patient Education © 2019 [employer]    How to Take Your Blood Pressure  Blood pressure is a measurement of how strongly your blood is pressing against the walls of your arteries. Arteries are blood vessels that carry blood from your heart throughout your body. Your health care provider takes your blood pressure at each office visit. You can also take your own blood pressure at home with a blood pressure machine. You may need to take your own blood pressure:  · To confirm a diagnosis of high blood pressure (hypertension).  · To monitor your blood pressure over time.  · To make sure your blood pressure medicine is working.  Supplies needed:  To take your blood pressure, you will need a blood pressure machine. You can buy a blood pressure machine, or blood pressure monitor, at most [employer] or online. There are several types of home blood pressure monitors. When choosing one, consider the following:  · Choose a monitor that has an arm cuff.  · Choose a cuff that wraps snugly around your upper arm. You should be able to fit only one finger between your arm and the cuff.  · Do not  "choose a monitor that measures your blood pressure from your wrist or finger.  Your health care provider can suggest a reliable monitor that will meet your needs.  How to prepare  To get the most accurate reading, avoid the following for 30 minutes before you check your blood pressure:  · Drinking caffeine.  · Drinking alcohol.  · Eating.  · Smoking.  · Exercising.  Five minutes before you check your blood pressure:  · Empty your bladder.  · Sit quietly without talking in a dining chair, rather than in a soft couch or armchair.  How to take your blood pressure  To check your blood pressure, follow the instructions in the manual that came with your blood pressure monitor. If you have a digital blood pressure monitor, the instructions may be as follows:  1. Sit up straight.  2. Place your feet on the floor. Do not cross your ankles or legs.  3. Rest your left arm at the level of your heart on a table or desk or on the arm of a chair.  4. Pull up your shirt sleeve.  5. Wrap the blood pressure cuff around the upper part of your left arm, 1 inch (2.5 cm) above your elbow. It is best to wrap the cuff around bare skin.  6. Fit the cuff snugly around your arm. You should be able to place only one finger between the cuff and your arm.  7. Position the cord inside the groove of your elbow.  8. Press the power button.  9. Sit quietly while the cuff inflates and deflates.  10. Read the digital reading on the monitor screen and write it down (record it).  11. Wait 2-3 minutes, then repeat the steps, starting at step 1.  What does my blood pressure reading mean?  A blood pressure reading consists of a higher number over a lower number. Ideally, your blood pressure should be below 120/80. The first (\"top\") number is called the systolic pressure. It is a measure of the pressure in your arteries as your heart beats. The second (\"bottom\") number is called the diastolic pressure. It is a measure of the pressure in your arteries as the " heart relaxes.  Blood pressure is classified into four stages. The following are the stages for adults who do not have a short-term serious illness or a chronic condition. Systolic pressure and diastolic pressure are measured in a unit called mm Hg.  Normal  · Systolic pressure: below 120.  · Diastolic pressure: below 80.  Elevated  · Systolic pressure: 120-129.  · Diastolic pressure: below 80.  Hypertension stage 1  · Systolic pressure: 130-139.  · Diastolic pressure: 80-89.  Hypertension stage 2  · Systolic pressure: 140 or above.  · Diastolic pressure: 90 or above.  You can have prehypertension or hypertension even if only the systolic or only the diastolic number in your reading is higher than normal.  Follow these instructions at home:  · Check your blood pressure as often as recommended by your health care provider.  · Take your monitor to the next appointment with your health care provider to make sure:  ? That you are using it correctly.  ? That it provides accurate readings.  · Be sure you understand what your goal blood pressure numbers are.  · Tell your health care provider if you are having any side effects from blood pressure medicine.  Contact a health care provider if:  · Your blood pressure is consistently high.  Get help right away if:  · Your systolic blood pressure is higher than 180.  · Your diastolic blood pressure is higher than 110.  This information is not intended to replace advice given to you by your health care provider. Make sure you discuss any questions you have with your health care provider.  Document Released: 05/26/2017 Document Revised: 10/30/2018 Document Reviewed: 05/26/2017  [employer] Interactive Patient Education © 2019 [employer]    Food Choices for Gastroesophageal Reflux Disease, Adult  When you have gastroesophageal reflux disease (GERD), the foods you eat and your eating habits are very important. Choosing the right foods can help ease the discomfort of GERD. Consider  working with a diet and nutrition specialist (dietitian) to help you make healthy food choices.  What general guidelines should I follow?    Eating plan  · Choose healthy foods low in fat, such as fruits, vegetables, whole grains, low-fat dairy products, and lean meat, fish, and poultry.  · Eat frequent, small meals instead of three large meals each day. Eat your meals slowly, in a relaxed setting. Avoid bending over or lying down until 2-3 hours after eating.  · Limit high-fat foods such as fatty meats or fried foods.  · Limit your intake of oils, butter, and shortening to less than 8 teaspoons each day.  · Avoid the following:  ? Foods that cause symptoms. These may be different for different people. Keep a food diary to keep track of foods that cause symptoms.  ? Alcohol.  ? Drinking large amounts of liquid with meals.  ? Eating meals during the 2-3 hours before bed.  · Cook foods using methods other than frying. This may include baking, grilling, or broiling.  Lifestyle  · Maintain a healthy weight. Ask your health care provider what weight is healthy for you. If you need to lose weight, work with your health care provider to do so safely.  · Exercise for at least 30 minutes on 5 or more days each week, or as told by your health care provider.  · Avoid wearing clothes that fit tightly around your waist and chest.  · Do not use any products that contain nicotine or tobacco, such as cigarettes and e-cigarettes. If you need help quitting, ask your health care provider.  · Sleep with the head of your bed raised. Use a wedge under the mattress or blocks under the bed frame to raise the head of the bed.  What foods are not recommended?  The items listed may not be a complete list. Talk with your dietitian about what dietary choices are best for you.  Grains  Pastries or quick breads with added fat. French toast.  Vegetables  Deep fried vegetables. French fries. Any vegetables prepared with added fat. Any vegetables  that cause symptoms. For some people this may include tomatoes and tomato products, chili peppers, onions and garlic, and horseradish.  Fruits  Any fruits prepared with added fat. Any fruits that cause symptoms. For some people this may include citrus fruits, such as oranges, grapefruit, pineapple, and jesus.  Meats and other protein foods  High-fat meats, such as fatty beef or pork, hot dogs, ribs, ham, sausage, salami and schreiber. Fried meat or protein, including fried fish and fried chicken. Nuts and nut butters.  Dairy  Whole milk and chocolate milk. Sour cream. Cream. Ice cream. Cream cheese. Milk shakes.  Beverages  Coffee and tea, with or without caffeine. Carbonated beverages. Sodas. Energy drinks. Fruit juice made with acidic fruits (such as orange or grapefruit). Tomato juice. Alcoholic drinks.  Fats and oils  Butter. Margarine. Shortening. Ghee.  Sweets and desserts  Chocolate and cocoa. Donuts.  Seasoning and other foods  Pepper. Peppermint and spearmint. Any condiments, herbs, or seasonings that cause symptoms. For some people, this may include velez, hot sauce, or vinegar-based salad dressings.  Summary  · When you have gastroesophageal reflux disease (GERD), food and lifestyle choices are very important to help ease the discomfort of GERD.  · Eat frequent, small meals instead of three large meals each day. Eat your meals slowly, in a relaxed setting. Avoid bending over or lying down until 2-3 hours after eating.  · Limit high-fat foods such as fatty meat or fried foods.  This information is not intended to replace advice given to you by your health care provider. Make sure you discuss any questions you have with your health care provider.  Document Released: 12/18/2006 Document Revised: 12/19/2017 Document Reviewed: 12/19/2017  [employer] Interactive Patient Education © 2019 [employer]

## 2020-01-03 NOTE — PROGRESS NOTES
Subjective:     Diane Guan is a 79 y.o. female who presents for  Chief Complaint   Patient presents with   • Hypertension   • Hyperlipidemia       This is a new patient to me.    HPI  Patient has a concurrent medical history of essential hypertension that is currently treated with amlodipine 10 mg and losartan 100 mg.  Patient is hypertensive in office.  Reports generally healthy diet.  Does not participate in regular exercise.  Associated cardiac comorbidity includes obesity, with BMI 30.7, and hyperlipidemia that is currently untreated.    Has a concurrent medical history of GERD and hiatal hernia and is seen by GI. Taking PPI, Carafate 1 QID, Reglan 10 mg in the morning and 5 mg PRN QID. Planning for Nissen fundoplication.     Patient also a concurrent medical history of DM II and is currently stable on metformin 500 mg. Last HbA1c was at goal.  Diabetic eye exam is up-to-date.  Diabetic foot exam is due.    She also has a concurrent medical history of hypothyroidism that is currently stable on levothyroxine 75 mcg.  Last TSH in August 2019 was within normal limits.    Diagnosed with MAC following an aspiration in February 2019 and started on triple antibiotic therapy (rifampin, ethambutol, and azithromycin) in March 2019. Plan to continue antibiotics for a total of 18 months.     Preventative:  Health Maintenance   Topic Date Due   • TDAP/TD VACCINES (1 - Tdap) 04/29/1951   • ZOSTER VACCINE (1 of 2) 04/29/1991   • PNEUMOCOCCAL VACCINE (65+ HIGH RISK) (1 of 2 - PCV13) 04/29/2006   • MEDICARE ANNUAL WELLNESS  08/28/2017   • INFLUENZA VACCINE  08/01/2019   • LIPID PANEL  12/27/2019       Past Medical Hx:  Past Medical History:   Diagnosis Date   • Acid reflux    • Anxiety disorder    • Arthritis    • Breast cancer (CMS/HCC)    • Depression    • H/O mammogram 08/2017   • Hemorrhoid    • Hyperlipidemia    • Hypertension    • TB (tuberculosis)     per MD not TB but MAC infection       Past Surgical Hx:  Past  Surgical History:   Procedure Laterality Date   • BLADDER SURGERY     • BRONCHOSCOPY N/A 9/13/2019    Procedure: BRONCHOSCOPY with bronchial washing;  Surgeon: Marnie Frankel MD;  Location: [facility] ENDOSCOPY;  Service: Pulmonary   • COLONOSCOPY     • CYST REMOVAL      Removed a fatty cyst off of [facility] back , [facility]    • MASTECTOMY Right    • TUBAL ABDOMINAL LIGATION         Family Hx:  Family History   Problem Relation Age of Onset   • Diabetes Mother    • Arthritis Other    • Heart disease Other         Social History:  Social History     Socioeconomic History   • Marital status:      Spouse name: Not on file   • Number of children: Not on file   • Years of education: Not on file   • Highest education level: Not on file   Tobacco Use   • Smoking status: Former Smoker   • Smokeless tobacco: Never Used   Substance and Sexual Activity   • Alcohol use: No     Frequency: Never   • Drug use: No   • Sexual activity: Defer       Allergies:  Patient has no known allergies.    Current Meds:    Current Outpatient Medications:   •  albuterol (PROVENTIL) (2.5 MG/3ML) 0.083% nebulizer solution, ALBUTEROL SULFATE (2.5 MG/3ML) 0.083% NEBU, Disp: , Rfl:   •  amLODIPine (NORVASC) 10 MG tablet, TAKE 1 TABLET BY MOUTH EVERY DAY, Disp: 90 tablet, Rfl: 1  •  anastrozole (ARIMIDEX) 1 MG tablet, Take 1 mg by mouth Daily., Disp: , Rfl: 3  •  azithromycin (ZITHROMAX) 250 MG tablet, Take  by mouth See Admin Instructions. Take 1 tablet by mouth on Monday, Wednesday, and Friday, Disp: , Rfl: 0  •  diazePAM (VALIUM) 2 MG tablet, TAKE 1 TABLET BY MOUTH EVERY 6 HOURS AS NEEDED, Disp: 30 tablet, Rfl: 0  •  docusate sodium (COLACE) 100 MG capsule, Take 100 mg by mouth 2 (Two) Times a Day., Disp: , Rfl: 3  •  ethambutol (MYAMBUTOL) 400 MG tablet, TAKE 1 TABLET BY MOUTH ON MONDAY, WEDNESDAYS AND FRIDAYS, Disp: , Rfl: 0  •  ipratropium-albuterol (DUO-NEB) 0.5-2.5 mg/3 ml nebulizer, INHALE 1 VIAL EVERY 6 HOURS AS NEEDED, Disp: ,  Rfl: 5  •  levothyroxine (SYNTHROID, LEVOTHROID) 75 MCG tablet, Take 1 tablet by mouth Every Morning Before Breakfast., Disp: 30 tablet, Rfl: 5  •  losartan (COZAAR) 100 MG tablet, TAKE 1 TABLET BY MOUTH DAILY, Disp: 90 tablet, Rfl: 3  •  metFORMIN ER (GLUCOPHAGE-XR) 500 MG 24 hr tablet, Take 1 tablet by mouth Daily., Disp: 90 tablet, Rfl: 1  •  metoclopramide (REGLAN) 10 MG tablet, , Disp: , Rfl:   •  mirtazapine (REMERON) 15 MG tablet, Daily., Disp: , Rfl:   •  pantoprazole (PROTONIX) 40 MG EC tablet, Daily., Disp: , Rfl:   •  rifAMPin (RIFADIN) 300 MG capsule, RIFAMPIN 300 MG CAPS, Disp: , Rfl:   •  sertraline (ZOLOFT) 100 MG tablet, TAKE 1 TABLET BY MOUTH EVERY DAY, Disp: 90 tablet, Rfl: 1  •  sucralfate (CARAFATE) 1 g tablet, TAKE 1 TABLET BY MOUTH 4 (FOUR) TIMES A DAY., Disp: 120 tablet, Rfl: 0  •  tolterodine (DETROL) 2 MG tablet, TAKE 1 TABLET BY MOUTH TWICE A DAY, Disp: 180 tablet, Rfl: 1    The following portions of the patient's history were reviewed and updated as appropriate: allergies, current medications, past family history, past medical history, past social history, past surgical history and problem list.    Review of Systems  Review of Systems   Constitutional: Negative for chills, diaphoresis, fatigue and fever.   HENT: Positive for hearing loss. Negative for congestion, rhinorrhea, sinus pressure, sneezing and sore throat.    Eyes: Negative for blurred vision, double vision and redness.   Respiratory: Negative for cough, shortness of breath and wheezing.    Cardiovascular: Negative for chest pain and leg swelling.   Gastrointestinal: Positive for GERD and indigestion. Negative for abdominal pain, constipation, diarrhea, nausea and vomiting.   Endocrine: Negative for polydipsia, polyphagia and polyuria.   Genitourinary: Negative for difficulty urinating, dysuria and hematuria.   Musculoskeletal: Positive for arthralgias. Negative for gait problem and myalgias.   Skin: Negative for rash and skin  "lesions.   Neurological: Negative for tremors, seizures, syncope and headache.   Psychiatric/Behavioral: Positive for sleep disturbance. Negative for depressed mood. The patient is not nervous/anxious.        Objective:     /77 (BP Location: Left arm, Patient Position: Sitting, Cuff Size: Large Adult)   Pulse 81   Temp 98.2 °F (36.8 °C) (Oral)   Ht 165.1 cm (65\")   Wt 83.7 kg (184 lb 9.6 oz)   SpO2 92%   BMI 30.72 kg/m²     Physical Exam   Constitutional: She is oriented to person, place, and time. She appears well-developed and well-nourished. No distress. She is obese.  HENT:   Head: Normocephalic and atraumatic.   Right Ear: Tympanic membrane and ear canal normal.   Left Ear: Ear canal normal. An impacted cerumen is present.  Nose: Nose normal.   Mouth/Throat: Oropharynx is clear and moist.   Hearing aid in left ear   Eyes: Pupils are equal, round, and reactive to light. Conjunctivae and EOM are normal. No scleral icterus.   Neck: Neck supple. No tracheal deviation present. No thyromegaly present.   Cardiovascular: Normal rate, regular rhythm, normal heart sounds and intact distal pulses.   Pulmonary/Chest: Effort normal. She has rales in the right lower field and the left lower field.   Abdominal: Soft. Bowel sounds are normal. There is no tenderness.   Lymphadenopathy:     She has no cervical adenopathy.   Neurological: She is alert and oriented to person, place, and time.   Skin: Skin is warm and dry. Capillary refill takes less than 2 seconds. No rash noted. She is not diaphoretic.   Psychiatric: She has a normal mood and affect. Her behavior is normal.   Vitals reviewed.      Lab Results   Component Value Date    WBC 13.30 (H) 06/25/2019    HGB 11.7 (L) 06/25/2019    HCT 36.4 06/25/2019    MCV 82.0 06/25/2019     06/25/2019     Lab Results   Component Value Date    GLUCOSE 95 03/17/2019    BUN 5 (L) 03/17/2019    CREATININE 0.7 03/17/2019    EGFRIFAFRI 59 (L) 04/27/2017    BCR 7.1 " 03/17/2019    K 3.8 03/17/2019    CO2 18 (L) 03/17/2019    CALCIUM 8.2 (L) 03/17/2019    ALBUMIN 2.5 (L) 03/17/2019    LABIL2 1.0 03/17/2019    AST 23 03/17/2019    ALT 14 03/17/2019     Lab Results   Component Value Date    TSH 2.840 08/14/2019     Lab Results   Component Value Date    CHOL 275 (H) 12/27/2018    TRIG 290 (H) 12/27/2018    HDL 50 12/27/2018     (H) 12/27/2018        Assessment/Plan:     Diane was seen today for hypertension and hyperlipidemia.    Diagnoses and all orders for this visit:    Essential hypertension  -     Comprehensive Metabolic Panel  -     Lipid Panel  -     amLODIPine (NORVASC) 10 MG tablet; Take 1 tablet by mouth Daily With Lunch.  -     rosuvastatin (CRESTOR) 10 MG tablet; Take 1 tablet by mouth Every Night.  -     losartan (COZAAR) 100 MG tablet; Take 1 tablet by mouth Daily.  -     chlorthalidone (HYGROTON) 25 MG tablet; Take 1 tablet by mouth Daily.  - Chronic problem that is moderately controlled. Discussed BP goal < 150/90.  Encouraged medication compliance with antihypertensive therapy. Encouraged a low-sodium diet. Encouraged 150 minutes of moderate intensity activity weekly.    Mixed hyperlipidemia  -     Comprehensive Metabolic Panel  -     Lipid Panel  -     rosuvastatin (CRESTOR) 10 MG tablet; Take 1 tablet by mouth Every Night.  - Chronic problem that is poorly controlled. Discussed lipid panel and cardiovascular risk with patient. Encouraged 150 minutes of moderate intensity activity weekly. Encouraged a diet rich in vegetables.    Type 2 diabetes mellitus without complication, without long-term current use of insulin (CMS/[facility])  -     Hemoglobin A1c  -     Microalbumin / Creatinine Urine Ratio - Urine, Clean Catch  - Chronic problem that is well controlled. Discussed HbA1c goal < 7%.  Encouraged medication compliance with oral hypoglycemic therapy.    Pulmonary Mycobacterium avium complex (MAC) infection (CMS/[facility])  -     CBC Auto Differential  -     Manual  Differential; Future  -     Manual Differential  - Chronic problem that is well controlled with triple antibiotic regimen.  Discussed potential side effects of antibiotic therapy.  Encouraged annual eye exams with ethambutol.    Hiatal hernia with GERD  -     pantoprazole (PROTONIX) 40 MG EC tablet; Take 1 tablet by mouth Every Morning Before Breakfast. Take on an empty stomach!  -     sucralfate (CARAFATE) 1 g tablet; Take 1 tablet by mouth 4 (Four) Times a Day Before Meals & at Bedtime.    Class 1 obesity due to excess calories with serious comorbidity and body mass index (BMI) of 30.0 to 30.9 in adult  -     Comprehensive Metabolic Panel  -     Lipid Panel  - Chronic problem that is moderately controlled. Discussed health risks associated with obesity. Encouraged 150 minutes of moderate intensity activity weekly. Patient provided with educational material regarding lifestyle modifications.    Acquired hypothyroidism  -     TSH    Other orders  -     sertraline (ZOLOFT) 100 MG tablet; Take 1 tablet by mouth Every Night.  -     tolterodine (DETROL) 2 MG tablet; Take 1 tablet by mouth 2 (Two) Times a Day.  -     mirtazapine (REMERON) 15 MG tablet; Take 1 tablet by mouth Every Night.      Follow-up:     Return in about 6 weeks (around 2/14/2020) for Recheck BP, DM, GERD.      Signature    Jemima Fontaine MD  Family Medicine  [facility]        This document has been electronically signed by Jemima Fontaine MD on January 3, 2020 11:22 AM

## 2020-01-06 DIAGNOSIS — E03.9 HYPOTHYROIDISM, UNSPECIFIED TYPE: Primary | ICD-10-CM

## 2020-01-06 RX ORDER — LEVOTHYROXINE SODIUM 0.1 MG/1
100 TABLET ORAL
Qty: 30 TABLET | Refills: 0 | Status: SHIPPED | OUTPATIENT
Start: 2020-01-06 | End: 2020-02-03

## 2020-01-20 RX ORDER — LOSARTAN POTASSIUM 100 MG/1
100 TABLET ORAL DAILY
Qty: 90 TABLET | Refills: 1 | Status: SHIPPED | OUTPATIENT
Start: 2020-01-20 | End: 2020-05-15 | Stop reason: SDUPTHER

## 2020-01-20 RX ORDER — CHLORTHALIDONE 25 MG/1
25 TABLET ORAL DAILY
Qty: 90 TABLET | Refills: 1 | Status: SHIPPED | OUTPATIENT
Start: 2020-01-20 | End: 2020-05-15 | Stop reason: SDUPTHER

## 2020-01-20 RX ORDER — METOCLOPRAMIDE 5 MG/1
5 TABLET ORAL 3 TIMES DAILY PRN
Qty: 60 TABLET | Refills: 1 | Status: SHIPPED | OUTPATIENT
Start: 2020-01-20 | End: 2020-03-22

## 2020-01-31 DIAGNOSIS — E03.9 HYPOTHYROIDISM, UNSPECIFIED TYPE: ICD-10-CM

## 2020-02-03 RX ORDER — LEVOTHYROXINE SODIUM 0.1 MG/1
100 TABLET ORAL
Qty: 30 TABLET | Refills: 0 | Status: SHIPPED | OUTPATIENT
Start: 2020-02-03 | End: 2020-02-14

## 2020-02-04 ENCOUNTER — OFFICE VISIT (OUTPATIENT)
Dept: FAMILY MEDICINE CLINIC | Facility: CLINIC | Age: 80
End: 2020-02-04

## 2020-02-04 ENCOUNTER — HOSPITAL ENCOUNTER (OUTPATIENT)
Dept: GENERAL RADIOLOGY | Facility: HOSPITAL | Age: 80
Discharge: HOME | End: 2020-02-04
Admitting: PHYSICIAN ASSISTANT

## 2020-02-04 VITALS
HEART RATE: 89 BPM | TEMPERATURE: 98.3 F | OXYGEN SATURATION: 92 % | SYSTOLIC BLOOD PRESSURE: 148 MMHG | WEIGHT: 183 LBS | DIASTOLIC BLOOD PRESSURE: 78 MMHG | BODY MASS INDEX: 30.45 KG/M2

## 2020-02-04 DIAGNOSIS — C50.111 MALIGNANT NEOPLASM OF CENTRAL PORTION OF RIGHT FEMALE BREAST, UNSPECIFIED ESTROGEN RECEPTOR STATUS (HCC): ICD-10-CM

## 2020-02-04 DIAGNOSIS — J98.8 RESPIRATORY INFECTION: ICD-10-CM

## 2020-02-04 DIAGNOSIS — N64.4 BREAST PAIN, LEFT: ICD-10-CM

## 2020-02-04 DIAGNOSIS — C91.10 CHRONIC LYMPHOID LEUKEMIA (HCC): Primary | ICD-10-CM

## 2020-02-04 PROBLEM — Z99.81 DEPENDENCE ON SUPPLEMENTAL OXYGEN: Status: ACTIVE | Noted: 2020-02-04

## 2020-02-04 PROBLEM — H61.22 IMPACTED CERUMEN OF LEFT EAR: Status: RESOLVED | Noted: 2017-08-07 | Resolved: 2020-02-04

## 2020-02-04 PROBLEM — N17.9 ACUTE RENAL FAILURE: Status: RESOLVED | Noted: 2017-08-24 | Resolved: 2020-02-04

## 2020-02-04 PROCEDURE — 96372 THER/PROPH/DIAG INJ SC/IM: CPT | Performed by: PHYSICIAN ASSISTANT

## 2020-02-04 PROCEDURE — 99214 OFFICE O/P EST MOD 30 MIN: CPT | Performed by: PHYSICIAN ASSISTANT

## 2020-02-04 PROCEDURE — 94640 AIRWAY INHALATION TREATMENT: CPT | Performed by: PHYSICIAN ASSISTANT

## 2020-02-04 PROCEDURE — 71046 X-RAY EXAM CHEST 2 VIEWS: CPT

## 2020-02-04 RX ORDER — IPRATROPIUM BROMIDE AND ALBUTEROL SULFATE 2.5; .5 MG/3ML; MG/3ML
3 SOLUTION RESPIRATORY (INHALATION) ONCE
Status: COMPLETED | OUTPATIENT
Start: 2020-02-04 | End: 2020-02-04

## 2020-02-04 RX ORDER — PREDNISONE 10 MG/1
TABLET ORAL
Qty: 20 TABLET | Refills: 0 | Status: SHIPPED | OUTPATIENT
Start: 2020-02-04 | End: 2020-02-06

## 2020-02-04 RX ORDER — METHYLPREDNISOLONE ACETATE 80 MG/ML
80 INJECTION, SUSPENSION INTRA-ARTICULAR; INTRALESIONAL; INTRAMUSCULAR; SOFT TISSUE ONCE
Status: COMPLETED | OUTPATIENT
Start: 2020-02-04 | End: 2020-02-04

## 2020-02-04 RX ORDER — CEFTRIAXONE 1 G/1
1 INJECTION, POWDER, FOR SOLUTION INTRAMUSCULAR; INTRAVENOUS EVERY 24 HOURS
Status: COMPLETED | OUTPATIENT
Start: 2020-02-04 | End: 2020-02-04

## 2020-02-04 RX ORDER — LEVOFLOXACIN 500 MG/1
500 TABLET, FILM COATED ORAL DAILY
Qty: 10 TABLET | Refills: 0 | Status: SHIPPED | OUTPATIENT
Start: 2020-02-04 | End: 2020-02-12 | Stop reason: HOSPADM

## 2020-02-04 RX ADMIN — CEFTRIAXONE 1 G: 1 INJECTION, POWDER, FOR SOLUTION INTRAMUSCULAR; INTRAVENOUS at 16:10

## 2020-02-04 RX ADMIN — IPRATROPIUM BROMIDE AND ALBUTEROL SULFATE 3 ML: 2.5; .5 SOLUTION RESPIRATORY (INHALATION) at 16:08

## 2020-02-04 RX ADMIN — METHYLPREDNISOLONE ACETATE 80 MG: 80 INJECTION, SUSPENSION INTRA-ARTICULAR; INTRALESIONAL; INTRAMUSCULAR; SOFT TISSUE at 16:09

## 2020-02-04 NOTE — PROGRESS NOTES
"Susana Guan is a 79 y.o. female     History of Present Illness  Patient is a 79-year-old white female complaining of cough, congestion, yellow phlegm for the past 10 days.  She states her coughing is severe at night in particular.  She is coughing so hard that she is vomiting up phlegm.  She denies fever, she denies over-the-counter medications.  Of note patient has chronic lymphocytic leukemia and right-sided breast cancer.    Patient also complains of left-sided breast pain.  She has a history of right-sided breast cancer, with mastectomy.  She states this is been bothering her for a couple of months, however she is not had a mammogram in \"a while\".    The following portions of the patient's history were reviewed and updated as appropriate: allergies, current medications, past family history, past medical history, past social history, past surgical history and problem list.    Review of Systems   Constitutional: Positive for fatigue. Negative for chills and fever.   HENT: Positive for congestion and sinus pressure. Negative for sore throat.    Respiratory: Positive for cough and shortness of breath.    Cardiovascular: Negative for chest pain.   Gastrointestinal: Positive for vomiting. Negative for abdominal pain, diarrhea and nausea.       Objective  Physical Exam   Constitutional: She is oriented to person, place, and time. She appears well-developed and well-nourished.   HENT:   Head: Normocephalic and atraumatic.   Right Ear: External ear normal.   Left Ear: External ear normal.   Nose: Nose normal.   Mouth/Throat: Oropharynx is clear and moist.   Eyes: Pupils are equal, round, and reactive to light. Conjunctivae and EOM are normal.   Neck: Normal range of motion. Neck supple.   Cardiovascular: Normal rate, regular rhythm and normal heart sounds.   Pulmonary/Chest: Effort normal. She has decreased breath sounds (tightness) in the right upper field, the right middle field, the right lower field, " the left upper field, the left middle field and the left lower field. She has wheezes in the right upper field, the right middle field, the right lower field, the left upper field, the left middle field and the left lower field. She has rhonchi in the right upper field, the right middle field, the right lower field, the left upper field, the left middle field and the left lower field. She has rales in the right upper field, the right middle field, the right lower field, the left upper field, the left middle field and the left lower field.   Very deep, spasming cough appreciated with deep inspiration, patient is coughing up thick phlegm.   Neurological: She is alert and oriented to person, place, and time.   Psychiatric: She has a normal mood and affect. Her behavior is normal.       Vitals:    02/04/20 1505   BP: 148/78   BP Location: Right arm   Patient Position: Sitting   Cuff Size: Adult   Pulse: 89   Temp: 98.3 °F (36.8 °C)   TempSrc: Oral   SpO2: 92%   Weight: 83 kg (183 lb)     Body mass index is 30.45 kg/m².    PHQ-9 Total Score: 0      Assessment/Plan  Diagnoses and all orders for this visit:    1. Chronic lymphoid leukemia (CMS/HCC) (Primary)  Comments:  Patient is immunocompromise, I am treating her more aggressively for this reason.    2. Malignant neoplasm of central portion of right female breast, unspecified estrogen receptor status (CMS/HCC)  Comments:  Patient is immunocompromise, I am treating her more aggressively for this reason.    3. Breast pain, left  Comments:  Diagnostic mammogram left breast ordered due to patient's breast pain.  Orders:  -     Mammo Diagnostic Digital Tomosynthesis Left With CAD; Future  -     US Breast Left Limited; Future    4. Respiratory infection  Comments:  Treating patient with Depo-Medrol 80 mg and Rocephin 1 g injections today.  Prescription for Levaquin and prednisone to start tomorrow.  Chest x-ray ordered.  ER precautions discussed with patient.  Breathing  treatment given and patient's breathing improved somewhat after that.  Orders:  -     methylPREDNISolone acetate (DEPO-medrol) injection 80 mg  -     cefTRIAXone (ROCEPHIN) injection 1 g  -     XR Chest PA & Lateral  -     ipratropium-albuterol (DUO-NEB) nebulizer solution 3 mL    Other orders  -     predniSONE (DELTASONE) 10 MG tablet; 4 tabs daily x 2 days then 3 tabs daily x 2 days then 2 tabs daily x 2 days then 1 tab daily x 2 days  Dispense: 20 tablet; Refill: 0  -     levoFLOXacin (LEVAQUIN) 500 MG tablet; Take 1 tablet by mouth Daily for 10 days.  Dispense: 10 tablet; Refill: 0

## 2020-02-06 ENCOUNTER — HOSPITAL ENCOUNTER (INPATIENT)
Facility: HOSPITAL | Age: 80
LOS: 6 days | Discharge: HOME | End: 2020-02-12
Attending: EMERGENCY MEDICINE | Admitting: INTERNAL MEDICINE

## 2020-02-06 ENCOUNTER — APPOINTMENT (OUTPATIENT)
Dept: GENERAL RADIOLOGY | Facility: HOSPITAL | Age: 80
End: 2020-02-06

## 2020-02-06 DIAGNOSIS — E03.9 HYPOTHYROIDISM, UNSPECIFIED TYPE: ICD-10-CM

## 2020-02-06 DIAGNOSIS — J30.2 SEASONAL ALLERGIES: Chronic | ICD-10-CM

## 2020-02-06 DIAGNOSIS — J21.1 ACUTE BRONCHIOLITIS DUE TO HUMAN METAPNEUMOVIRUS: Primary | ICD-10-CM

## 2020-02-06 DIAGNOSIS — R09.02 HYPOXEMIA: ICD-10-CM

## 2020-02-06 PROBLEM — F32.A DEPRESSION: Chronic | Status: ACTIVE | Noted: 2019-06-25

## 2020-02-06 PROBLEM — C50.919 BREAST CANCER: Chronic | Status: ACTIVE | Noted: 2019-01-16

## 2020-02-06 PROBLEM — E11.00 DM HYPEROSMOLARITY TYPE II (HCC): Chronic | Status: ACTIVE | Noted: 2020-02-06

## 2020-02-06 PROBLEM — C91.10 CHRONIC LYMPHOID LEUKEMIA: Chronic | Status: ACTIVE | Noted: 2017-09-21

## 2020-02-06 PROBLEM — K59.00 CONSTIPATION: Chronic | Status: ACTIVE | Noted: 2020-02-06

## 2020-02-06 PROBLEM — I10 HYPERTENSION: Chronic | Status: ACTIVE | Noted: 2019-06-25

## 2020-02-06 PROBLEM — N32.81 OVERACTIVE BLADDER: Chronic | Status: ACTIVE | Noted: 2020-02-06

## 2020-02-06 PROBLEM — E53.8 VITAMIN B 12 DEFICIENCY: Chronic | Status: ACTIVE | Noted: 2020-02-06

## 2020-02-06 PROBLEM — E78.5 HYPERLIPIDEMIA: Chronic | Status: ACTIVE | Noted: 2019-06-25

## 2020-02-06 PROBLEM — A31.0 MYCOBACTERIUM AVIUM COMPLEX: Chronic | Status: ACTIVE | Noted: 2019-03-21

## 2020-02-06 PROBLEM — G47.00 INSOMNIA: Chronic | Status: ACTIVE | Noted: 2018-02-01

## 2020-02-06 LAB
ALBUMIN SERPL-MCNC: 4.1 G/DL (ref 3.5–5.2)
ALBUMIN/GLOB SERPL: 1.5 G/DL
ALP SERPL-CCNC: 128 U/L (ref 39–117)
ALT SERPL W P-5'-P-CCNC: 10 U/L (ref 1–33)
ANION GAP SERPL CALCULATED.3IONS-SCNC: 16 MMOL/L (ref 5–15)
ANISOCYTOSIS BLD QL: ABNORMAL
AST SERPL-CCNC: 16 U/L (ref 1–32)
B PERT DNA SPEC QL NAA+PROBE: NOT DETECTED
BILIRUB SERPL-MCNC: 0.2 MG/DL (ref 0.2–1.2)
BUN BLD-MCNC: 22 MG/DL (ref 8–23)
BUN/CREAT SERPL: 18.2 (ref 7–25)
C PNEUM DNA NPH QL NAA+NON-PROBE: NOT DETECTED
CALCIUM SPEC-SCNC: 8.9 MG/DL (ref 8.6–10.5)
CHLORIDE SERPL-SCNC: 105 MMOL/L (ref 98–107)
CO2 SERPL-SCNC: 21 MMOL/L (ref 22–29)
CREAT BLD-MCNC: 1.21 MG/DL (ref 0.57–1)
D-LACTATE SERPL-SCNC: 1.4 MMOL/L (ref 0.5–2)
DEPRECATED RDW RBC AUTO: 46.4 FL (ref 37–54)
ERYTHROCYTE [DISTWIDTH] IN BLOOD BY AUTOMATED COUNT: 15.8 % (ref 12.3–15.4)
FLUAV H1 2009 PAND RNA NPH QL NAA+PROBE: NOT DETECTED
FLUAV H1 HA GENE NPH QL NAA+PROBE: NOT DETECTED
FLUAV H3 RNA NPH QL NAA+PROBE: NOT DETECTED
FLUAV SUBTYP SPEC NAA+PROBE: NOT DETECTED
FLUBV RNA ISLT QL NAA+PROBE: NOT DETECTED
GFR SERPL CREATININE-BSD FRML MDRD: 43 ML/MIN/1.73
GLOBULIN UR ELPH-MCNC: 2.7 GM/DL
GLUCOSE BLD-MCNC: 116 MG/DL (ref 65–99)
GLUCOSE BLDC GLUCOMTR-MCNC: 212 MG/DL (ref 70–105)
HADV DNA SPEC NAA+PROBE: NOT DETECTED
HCOV 229E RNA SPEC QL NAA+PROBE: NOT DETECTED
HCOV HKU1 RNA SPEC QL NAA+PROBE: NOT DETECTED
HCOV NL63 RNA SPEC QL NAA+PROBE: NOT DETECTED
HCOV OC43 RNA SPEC QL NAA+PROBE: NOT DETECTED
HCT VFR BLD AUTO: 36 % (ref 34–46.6)
HGB BLD-MCNC: 11.7 G/DL (ref 12–15.9)
HMPV RNA NPH QL NAA+NON-PROBE: DETECTED
HPIV1 RNA SPEC QL NAA+PROBE: NOT DETECTED
HPIV2 RNA SPEC QL NAA+PROBE: NOT DETECTED
HPIV3 RNA NPH QL NAA+PROBE: NOT DETECTED
HPIV4 P GENE NPH QL NAA+PROBE: NOT DETECTED
LYMPHOCYTES # BLD MANUAL: 10.57 10*3/MM3 (ref 0.7–3.1)
LYMPHOCYTES NFR BLD MANUAL: 70 % (ref 19.6–45.3)
M PNEUMO IGG SER IA-ACNC: NOT DETECTED
MCH RBC QN AUTO: 27.1 PG (ref 26.6–33)
MCHC RBC AUTO-ENTMCNC: 32.4 G/DL (ref 31.5–35.7)
MCV RBC AUTO: 83.6 FL (ref 79–97)
NEUTROPHILS # BLD AUTO: 4.53 10*3/MM3 (ref 1.7–7)
NEUTROPHILS NFR BLD MANUAL: 16 % (ref 42.7–76)
NEUTS BAND NFR BLD MANUAL: 14 % (ref 0–5)
PATHOLOGY REVIEW: YES
PLAT MORPH BLD: NORMAL
PLATELET # BLD AUTO: 171 10*3/MM3 (ref 140–450)
PMV BLD AUTO: 9.9 FL (ref 6–12)
POTASSIUM BLD-SCNC: 4 MMOL/L (ref 3.5–5.2)
PROT SERPL-MCNC: 6.8 G/DL (ref 6–8.5)
RBC # BLD AUTO: 4.31 10*6/MM3 (ref 3.77–5.28)
RHINOVIRUS RNA SPEC NAA+PROBE: NOT DETECTED
RSV RNA NPH QL NAA+NON-PROBE: NOT DETECTED
SCAN SLIDE: NORMAL
SMUDGE CELLS BLD QL SMEAR: ABNORMAL
SODIUM BLD-SCNC: 142 MMOL/L (ref 136–145)
TSH SERPL DL<=0.05 MIU/L-ACNC: 1.61 UIU/ML (ref 0.27–4.2)
WBC NRBC COR # BLD: 15.1 10*3/MM3 (ref 3.4–10.8)

## 2020-02-06 PROCEDURE — 25010000002 HEPARIN (PORCINE) PER 1000 UNITS: Performed by: NURSE PRACTITIONER

## 2020-02-06 PROCEDURE — 94640 AIRWAY INHALATION TREATMENT: CPT

## 2020-02-06 PROCEDURE — 71045 X-RAY EXAM CHEST 1 VIEW: CPT

## 2020-02-06 PROCEDURE — 99223 1ST HOSP IP/OBS HIGH 75: CPT | Performed by: HOSPITALIST

## 2020-02-06 PROCEDURE — 83036 HEMOGLOBIN GLYCOSYLATED A1C: CPT | Performed by: NURSE PRACTITIONER

## 2020-02-06 PROCEDURE — 83605 ASSAY OF LACTIC ACID: CPT

## 2020-02-06 PROCEDURE — 25010000002 PROCHLORPERAZINE 10 MG/2ML SOLUTION: Performed by: NURSE PRACTITIONER

## 2020-02-06 PROCEDURE — 94799 UNLISTED PULMONARY SVC/PX: CPT

## 2020-02-06 PROCEDURE — 85007 BL SMEAR W/DIFF WBC COUNT: CPT | Performed by: NURSE PRACTITIONER

## 2020-02-06 PROCEDURE — G0378 HOSPITAL OBSERVATION PER HR: HCPCS

## 2020-02-06 PROCEDURE — 99284 EMERGENCY DEPT VISIT MOD MDM: CPT

## 2020-02-06 PROCEDURE — 82962 GLUCOSE BLOOD TEST: CPT

## 2020-02-06 PROCEDURE — 80053 COMPREHEN METABOLIC PANEL: CPT | Performed by: NURSE PRACTITIONER

## 2020-02-06 PROCEDURE — 85025 COMPLETE CBC W/AUTO DIFF WBC: CPT | Performed by: NURSE PRACTITIONER

## 2020-02-06 PROCEDURE — 87040 BLOOD CULTURE FOR BACTERIA: CPT | Performed by: NURSE PRACTITIONER

## 2020-02-06 PROCEDURE — 0099U HC BIOFIRE FILMARRAY RESP PANEL 1: CPT | Performed by: EMERGENCY MEDICINE

## 2020-02-06 PROCEDURE — 84443 ASSAY THYROID STIM HORMONE: CPT | Performed by: FAMILY MEDICINE

## 2020-02-06 PROCEDURE — 25010000002 METHYLPREDNISOLONE PER 40 MG: Performed by: NURSE PRACTITIONER

## 2020-02-06 RX ORDER — MIRTAZAPINE 15 MG/1
15 TABLET, FILM COATED ORAL NIGHTLY
Status: DISCONTINUED | OUTPATIENT
Start: 2020-02-06 | End: 2020-02-12 | Stop reason: HOSPADM

## 2020-02-06 RX ORDER — FERROUS SULFATE TAB EC 324 MG (65 MG FE EQUIVALENT) 324 (65 FE) MG
324 TABLET DELAYED RESPONSE ORAL 3 TIMES WEEKLY
COMMUNITY

## 2020-02-06 RX ORDER — SUCRALFATE 1 G/1
1 TABLET ORAL
Status: DISCONTINUED | OUTPATIENT
Start: 2020-02-06 | End: 2020-02-12 | Stop reason: HOSPADM

## 2020-02-06 RX ORDER — NICOTINE POLACRILEX 4 MG
15 LOZENGE BUCCAL
Status: DISCONTINUED | OUTPATIENT
Start: 2020-02-06 | End: 2020-02-12 | Stop reason: HOSPADM

## 2020-02-06 RX ORDER — SODIUM CHLORIDE 9 MG/ML
75 INJECTION, SOLUTION INTRAVENOUS CONTINUOUS
Status: DISCONTINUED | OUTPATIENT
Start: 2020-02-06 | End: 2020-02-11

## 2020-02-06 RX ORDER — DEXTROSE MONOHYDRATE 25 G/50ML
25 INJECTION, SOLUTION INTRAVENOUS
Status: DISCONTINUED | OUTPATIENT
Start: 2020-02-06 | End: 2020-02-12 | Stop reason: HOSPADM

## 2020-02-06 RX ORDER — RIFAMPIN 300 MG/1
300 CAPSULE ORAL 3 TIMES WEEKLY
COMMUNITY
End: 2020-02-14

## 2020-02-06 RX ORDER — PREDNISONE 10 MG/1
10 TABLET ORAL TAKE AS DIRECTED
COMMUNITY
End: 2020-02-12 | Stop reason: HOSPADM

## 2020-02-06 RX ORDER — HEPARIN SODIUM 5000 [USP'U]/ML
5000 INJECTION, SOLUTION INTRAVENOUS; SUBCUTANEOUS EVERY 12 HOURS SCHEDULED
Status: DISCONTINUED | OUTPATIENT
Start: 2020-02-06 | End: 2020-02-09

## 2020-02-06 RX ORDER — ROSUVASTATIN CALCIUM 10 MG/1
10 TABLET, COATED ORAL NIGHTLY
Status: DISCONTINUED | OUTPATIENT
Start: 2020-02-06 | End: 2020-02-12 | Stop reason: HOSPADM

## 2020-02-06 RX ORDER — ETHAMBUTOL HYDROCHLORIDE 400 MG/1
400 TABLET, FILM COATED ORAL 3 TIMES WEEKLY
Status: DISCONTINUED | OUTPATIENT
Start: 2020-02-07 | End: 2020-02-12 | Stop reason: HOSPADM

## 2020-02-06 RX ORDER — LANOLIN ALCOHOL/MO/W.PET/CERES
1000 CREAM (GRAM) TOPICAL DAILY
Status: DISCONTINUED | OUTPATIENT
Start: 2020-02-06 | End: 2020-02-12 | Stop reason: HOSPADM

## 2020-02-06 RX ORDER — AZITHROMYCIN 250 MG/1
250 TABLET, FILM COATED ORAL 3 TIMES WEEKLY
COMMUNITY
End: 2020-02-14

## 2020-02-06 RX ORDER — OXYBUTYNIN CHLORIDE 10 MG/1
10 TABLET, EXTENDED RELEASE ORAL DAILY
Status: DISCONTINUED | OUTPATIENT
Start: 2020-02-06 | End: 2020-02-12 | Stop reason: HOSPADM

## 2020-02-06 RX ORDER — HYDRALAZINE HYDROCHLORIDE 20 MG/ML
10 INJECTION INTRAMUSCULAR; INTRAVENOUS EVERY 6 HOURS PRN
Status: DISCONTINUED | OUTPATIENT
Start: 2020-02-06 | End: 2020-02-12 | Stop reason: HOSPADM

## 2020-02-06 RX ORDER — RIFAMPIN 300 MG/1
300 CAPSULE ORAL 3 TIMES WEEKLY
Status: DISCONTINUED | OUTPATIENT
Start: 2020-02-07 | End: 2020-02-12 | Stop reason: HOSPADM

## 2020-02-06 RX ORDER — BENZONATATE 100 MG/1
200 CAPSULE ORAL 3 TIMES DAILY PRN
Status: DISCONTINUED | OUTPATIENT
Start: 2020-02-06 | End: 2020-02-12 | Stop reason: HOSPADM

## 2020-02-06 RX ORDER — ACETAMINOPHEN 650 MG/1
650 SUPPOSITORY RECTAL EVERY 4 HOURS PRN
Status: DISCONTINUED | OUTPATIENT
Start: 2020-02-06 | End: 2020-02-12 | Stop reason: HOSPADM

## 2020-02-06 RX ORDER — FERROUS SULFATE TAB EC 324 MG (65 MG FE EQUIVALENT) 324 (65 FE) MG
324 TABLET DELAYED RESPONSE ORAL DAILY
Status: DISCONTINUED | OUTPATIENT
Start: 2020-02-06 | End: 2020-02-12 | Stop reason: HOSPADM

## 2020-02-06 RX ORDER — DIPHENHYDRAMINE HYDROCHLORIDE 50 MG/ML
12.5 INJECTION INTRAMUSCULAR; INTRAVENOUS EVERY 6 HOURS PRN
Status: DISCONTINUED | OUTPATIENT
Start: 2020-02-06 | End: 2020-02-12 | Stop reason: HOSPADM

## 2020-02-06 RX ORDER — AMLODIPINE BESYLATE 5 MG/1
10 TABLET ORAL
Status: DISCONTINUED | OUTPATIENT
Start: 2020-02-07 | End: 2020-02-12 | Stop reason: HOSPADM

## 2020-02-06 RX ORDER — ONDANSETRON 2 MG/ML
4 INJECTION INTRAMUSCULAR; INTRAVENOUS EVERY 6 HOURS PRN
Status: DISCONTINUED | OUTPATIENT
Start: 2020-02-06 | End: 2020-02-12 | Stop reason: HOSPADM

## 2020-02-06 RX ORDER — METFORMIN HYDROCHLORIDE 500 MG/1
500 TABLET, EXTENDED RELEASE ORAL
COMMUNITY
End: 2020-02-14

## 2020-02-06 RX ORDER — CETIRIZINE HYDROCHLORIDE 10 MG/1
5 TABLET ORAL DAILY
Status: DISCONTINUED | OUTPATIENT
Start: 2020-02-06 | End: 2020-02-12 | Stop reason: HOSPADM

## 2020-02-06 RX ORDER — ONDANSETRON 4 MG/1
4 TABLET, FILM COATED ORAL EVERY 6 HOURS PRN
Status: DISCONTINUED | OUTPATIENT
Start: 2020-02-06 | End: 2020-02-12 | Stop reason: HOSPADM

## 2020-02-06 RX ORDER — IPRATROPIUM BROMIDE AND ALBUTEROL SULFATE 2.5; .5 MG/3ML; MG/3ML
3 SOLUTION RESPIRATORY (INHALATION) ONCE
Status: COMPLETED | OUTPATIENT
Start: 2020-02-06 | End: 2020-02-06

## 2020-02-06 RX ORDER — LEVOTHYROXINE SODIUM 0.1 MG/1
100 TABLET ORAL
Status: DISCONTINUED | OUTPATIENT
Start: 2020-02-07 | End: 2020-02-12 | Stop reason: HOSPADM

## 2020-02-06 RX ORDER — SODIUM CHLORIDE 0.9 % (FLUSH) 0.9 %
10 SYRINGE (ML) INJECTION AS NEEDED
Status: DISCONTINUED | OUTPATIENT
Start: 2020-02-06 | End: 2020-02-12 | Stop reason: HOSPADM

## 2020-02-06 RX ORDER — MONTELUKAST SODIUM 10 MG/1
10 TABLET ORAL NIGHTLY
COMMUNITY
End: 2020-02-06

## 2020-02-06 RX ORDER — IPRATROPIUM BROMIDE AND ALBUTEROL SULFATE 2.5; .5 MG/3ML; MG/3ML
3 SOLUTION RESPIRATORY (INHALATION)
Status: DISCONTINUED | OUTPATIENT
Start: 2020-02-06 | End: 2020-02-07

## 2020-02-06 RX ORDER — SERTRALINE HYDROCHLORIDE 100 MG/1
100 TABLET, FILM COATED ORAL NIGHTLY
Status: DISCONTINUED | OUTPATIENT
Start: 2020-02-06 | End: 2020-02-12 | Stop reason: HOSPADM

## 2020-02-06 RX ORDER — CETIRIZINE HYDROCHLORIDE 10 MG/1
10 TABLET ORAL
COMMUNITY

## 2020-02-06 RX ORDER — CHOLECALCIFEROL (VITAMIN D3) 125 MCG
1000 CAPSULE ORAL DAILY
COMMUNITY
End: 2020-08-18

## 2020-02-06 RX ORDER — ACETAMINOPHEN 325 MG/1
650 TABLET ORAL EVERY 4 HOURS PRN
Status: DISCONTINUED | OUTPATIENT
Start: 2020-02-06 | End: 2020-02-12 | Stop reason: HOSPADM

## 2020-02-06 RX ORDER — PROCHLORPERAZINE EDISYLATE 5 MG/ML
5 INJECTION INTRAMUSCULAR; INTRAVENOUS ONCE
Status: COMPLETED | OUTPATIENT
Start: 2020-02-06 | End: 2020-02-06

## 2020-02-06 RX ORDER — AZITHROMYCIN 250 MG/1
250 TABLET, FILM COATED ORAL 3 TIMES WEEKLY
Status: DISCONTINUED | OUTPATIENT
Start: 2020-02-08 | End: 2020-02-12 | Stop reason: HOSPADM

## 2020-02-06 RX ORDER — PANTOPRAZOLE SODIUM 40 MG/1
40 TABLET, DELAYED RELEASE ORAL
Status: DISCONTINUED | OUTPATIENT
Start: 2020-02-07 | End: 2020-02-09

## 2020-02-06 RX ORDER — DOCUSATE SODIUM 100 MG/1
100 CAPSULE, LIQUID FILLED ORAL 2 TIMES DAILY
Status: DISCONTINUED | OUTPATIENT
Start: 2020-02-06 | End: 2020-02-12 | Stop reason: HOSPADM

## 2020-02-06 RX ORDER — SODIUM CHLORIDE 0.9 % (FLUSH) 0.9 %
10 SYRINGE (ML) INJECTION EVERY 12 HOURS SCHEDULED
Status: DISCONTINUED | OUTPATIENT
Start: 2020-02-06 | End: 2020-02-12 | Stop reason: HOSPADM

## 2020-02-06 RX ORDER — ACETAMINOPHEN 160 MG/5ML
650 SOLUTION ORAL EVERY 4 HOURS PRN
Status: DISCONTINUED | OUTPATIENT
Start: 2020-02-06 | End: 2020-02-12 | Stop reason: HOSPADM

## 2020-02-06 RX ORDER — METOCLOPRAMIDE 5 MG/1
7.5 TABLET ORAL EVERY MORNING
Status: DISCONTINUED | OUTPATIENT
Start: 2020-02-07 | End: 2020-02-09

## 2020-02-06 RX ORDER — GUAIFENESIN 600 MG/1
1200 TABLET, EXTENDED RELEASE ORAL EVERY 12 HOURS SCHEDULED
Status: DISCONTINUED | OUTPATIENT
Start: 2020-02-06 | End: 2020-02-08

## 2020-02-06 RX ORDER — METHYLPREDNISOLONE SODIUM SUCCINATE 40 MG/ML
40 INJECTION, POWDER, LYOPHILIZED, FOR SOLUTION INTRAMUSCULAR; INTRAVENOUS EVERY 8 HOURS
Status: DISCONTINUED | OUTPATIENT
Start: 2020-02-06 | End: 2020-02-10

## 2020-02-06 RX ORDER — ANASTROZOLE 1 MG/1
1 TABLET ORAL DAILY
Status: DISCONTINUED | OUTPATIENT
Start: 2020-02-06 | End: 2020-02-12 | Stop reason: HOSPADM

## 2020-02-06 RX ADMIN — METHYLPREDNISOLONE SODIUM SUCCINATE 40 MG: 40 INJECTION, POWDER, FOR SOLUTION INTRAMUSCULAR; INTRAVENOUS at 20:00

## 2020-02-06 RX ADMIN — OXYBUTYNIN CHLORIDE 10 MG: 10 TABLET, EXTENDED RELEASE ORAL at 22:00

## 2020-02-06 RX ADMIN — MIRTAZAPINE 15 MG: 15 TABLET, FILM COATED ORAL at 20:01

## 2020-02-06 RX ADMIN — CYANOCOBALAMIN TAB 1000 MCG 1000 MCG: 1000 TAB at 21:59

## 2020-02-06 RX ADMIN — BENZONATATE 200 MG: 100 CAPSULE ORAL at 20:00

## 2020-02-06 RX ADMIN — SODIUM CHLORIDE 75 ML/HR: 900 INJECTION, SOLUTION INTRAVENOUS at 22:14

## 2020-02-06 RX ADMIN — ACETAMINOPHEN 650 MG: 325 TABLET, FILM COATED ORAL at 22:00

## 2020-02-06 RX ADMIN — FERROUS SULFATE TAB EC 324 MG (65 MG FE EQUIVALENT) 324 MG: 324 (65 FE) TABLET DELAYED RESPONSE at 21:59

## 2020-02-06 RX ADMIN — ROSUVASTATIN CALCIUM 10 MG: 10 TABLET, FILM COATED ORAL at 21:59

## 2020-02-06 RX ADMIN — Medication 10 ML: at 21:00

## 2020-02-06 RX ADMIN — HEPARIN SODIUM 5000 UNITS: 5000 INJECTION INTRAVENOUS; SUBCUTANEOUS at 22:00

## 2020-02-06 RX ADMIN — CETIRIZINE HYDROCHLORIDE 5 MG: 10 TABLET, FILM COATED ORAL at 22:00

## 2020-02-06 RX ADMIN — GUAIFENESIN 1200 MG: 600 TABLET, EXTENDED RELEASE ORAL at 20:00

## 2020-02-06 RX ADMIN — IPRATROPIUM BROMIDE AND ALBUTEROL SULFATE 3 ML: .5; 3 SOLUTION RESPIRATORY (INHALATION) at 20:28

## 2020-02-06 RX ADMIN — Medication 10 ML: at 20:01

## 2020-02-06 RX ADMIN — SERTRALINE 100 MG: 100 TABLET, FILM COATED ORAL at 21:58

## 2020-02-06 RX ADMIN — PROCHLORPERAZINE EDISYLATE 5 MG: 5 INJECTION INTRAMUSCULAR; INTRAVENOUS at 22:36

## 2020-02-06 RX ADMIN — SUCRALFATE 1 G: 1 TABLET ORAL at 20:01

## 2020-02-06 RX ADMIN — DOCUSATE SODIUM 100 MG: 100 CAPSULE, LIQUID FILLED ORAL at 21:59

## 2020-02-06 RX ADMIN — IPRATROPIUM BROMIDE AND ALBUTEROL SULFATE 3 ML: .5; 3 SOLUTION RESPIRATORY (INHALATION) at 13:50

## 2020-02-06 NOTE — ED PROVIDER NOTES
Subjective   79-year-old female complaining of a 5-day history of increasing shortness of breath.  States that her cough was initially productive of clear sputum and then it turned yellow.  The patient was started on ceftriaxone and Depo-Medrol yesterday in the office and was given a prescription for Levaquin and prednisone.  The patient states that the cough is keeping her up at night and is giving her some reflux.  She states that it does hurt to take a deep breath and cough.  She reports that she has had no actual vomiting or diarrhea.  The patient while at the primary care provider had an O2 saturation in the mid 80s          Review of Systems   Constitutional: Positive for chills, fatigue and fever.   HENT: Negative for rhinorrhea and sore throat.    Respiratory: Positive for cough, chest tightness, shortness of breath and wheezing. Negative for stridor.    Cardiovascular: Positive for chest pain. Negative for palpitations and leg swelling.   Gastrointestinal: Negative for abdominal pain.   Neurological: Negative for syncope.   Hematological: Negative for adenopathy.   All other systems reviewed and are negative.      Past Medical History:   Diagnosis Date   • Acid reflux    • Anxiety disorder    • Arthritis    • Breast cancer (CMS/HCC)    • Depression    • H/O mammogram 08/2017   • Hemorrhoid    • Hyperlipidemia    • Hypertension    • TB (tuberculosis)     per MD not TB but MAC infection       No Known Allergies    Past Surgical History:   Procedure Laterality Date   • BLADDER SURGERY     • BRONCHOSCOPY N/A 9/13/2019    Procedure: BRONCHOSCOPY with bronchial washing;  Surgeon: Marnie Frankel MD;  Location: [facility] ENDOSCOPY;  Service: Pulmonary   • COLONOSCOPY     • CYST REMOVAL      Removed a fatty cyst off of her back , abstraction from [facility].    • MASTECTOMY Right    • TUBAL ABDOMINAL LIGATION         Family History   Problem Relation Age of Onset   • Diabetes Mother    • Arthritis Other    • Heart disease  Other        Social History     Socioeconomic History   • Marital status:      Spouse name: Not on file   • Number of children: Not on file   • Years of education: Not on file   • Highest education level: Not on file   Tobacco Use   • Smoking status: Former Smoker   • Smokeless tobacco: Never Used   Substance and Sexual Activity   • Alcohol use: No     Frequency: Never   • Drug use: No   • Sexual activity: Defer           Objective   Physical Exam  Alert Tommy Coma Scale 15   HEENT: Pupils equal and reactive to light. Conjunctivae are not injected. normal tympanic membranes. Oropharynx and nares are normal.   Neck: Supple. Midline trachea. No JVD. No goiter.   Chest: Extensive rhonchi and expiratory wheezes bilaterally and equal breath sounds bilaterally regular rate and rhythm without murmur or rub.   Abdomen: Positive bowel sounds nontender nondistended. No rebound or peritoneal signs. No CVA tenderness.   Extremities no clubbing cyanosis or edema motor sensory exam is normal the full range of motion is intact   skin: Warm and dry, no rashes or petechia.   Lymphatic: No regional lymphadenopathy. No calf pain, swelling or Shaniqua's sign    Procedures           ED Course      Labs Reviewed   RESPIRATORY PANEL, PCR - Abnormal; Notable for the following components:       Result Value    Human Metapneumovirus Detected (*)     All other components within normal limits   COMPREHENSIVE METABOLIC PANEL - Abnormal; Notable for the following components:    Glucose 116 (*)     Creatinine 1.21 (*)     CO2 21.0 (*)     Alkaline Phosphatase 128 (*)     eGFR Non  Amer 43 (*)     Anion Gap 16.0 (*)     All other components within normal limits    Narrative:     GFR Normal >60  Chronic Kidney Disease <60  Kidney Failure <15     CBC WITH AUTO DIFFERENTIAL - Abnormal; Notable for the following components:    WBC 15.10 (*)     Hemoglobin 11.7 (*)     RDW 15.8 (*)     All other components within normal limits   MANUAL  DIFFERENTIAL - Abnormal; Notable for the following components:    Neutrophil % 16.0 (*)     Lymphocyte % 70.0 (*)     Bands %  14.0 (*)     Lymphocytes Absolute 10.57 (*)     All other components within normal limits   TSH - Normal   POC LACTATE - Normal   BLOOD CULTURE   BLOOD CULTURE   SCAN SLIDE   PATH CONSULT REFLEX   PATHOLOGY CONSULTATION   CBC AND DIFFERENTIAL    Narrative:     The following orders were created for panel order CBC & Differential.  Procedure                               Abnormality         Status                     ---------                               -----------         ------                     CBC Auto Differential[[id number]]        Abnormal            Final result                 Please view results for these tests on the individual orders.     Medications   ipratropium-albuterol (DUO-NEB) nebulizer solution 3 mL (3 mL Nebulization Given 2/6/20 1350)     Xr Chest 1 View    Result Date: 2/6/2020  There is no significant change when compared to the prior study. There is no evidence for acute cardiopulmonary process.  Electronically Signed By-Frederick Lucas On:2/6/2020 3:54 PM This report was finalized on [id number] by  Frederick Lucas, .    Xr Chest Pa & Lateral    Result Date: 2/4/2020  1. No acute cardiopulmonary abnormality. 2. Small hiatal hernia.  Electronically Signed By-Antony Lopez On:2/4/2020 9:40 PM This report was finalized on [id number] by  Antony Lopez, .         The patient was originally seen in our you and transferred to the main ER                                    MDM  Number of Diagnoses or Management Options     Amount and/or Complexity of Data Reviewed  Clinical lab tests: reviewed  Tests in the radiology section of CPT®: reviewed    Risk of Complications, Morbidity, and/or Mortality  Presenting problems: high  Diagnostic procedures: high  Management options: high  General comments: Given the prehospital hypoxia with O2 saturations in the mid upper 80s  the patient will be observed overnight.  Oxygenation improved while in the emergency department after treatment.  The patient does not have home oxygen.  The patient will also need inhaled bronchodilators.  She was agreeable to this plan of treatment        Final diagnoses:   Acute bronchiolitis due to human metapneumovirus   Hypoxemia            Holden Ortiz MD  02/06/20 1101

## 2020-02-06 NOTE — H&P
"      [facility] Medicine Services      Patient Name: Diane Guan  : 1940  MRN: [id number]  Primary Care Physician: Jemima Fontaine MD  Date of admission: 2020    Patient Care Team:  Jemima Fontaine MD as PCP - General (Family Medicine)          Subjective   History Present Illness     Chief Complaint:   Chief Complaint   Patient presents with   • Cough       Ms. Guan is a 79 y.o.  female with a past medical history of breast cancer, chronic lymphoid leukemia, MAC, hypertension, hyperlipidemia, diabetes mellitus type 2, GERD, and depression who presented to [facility] 2020 with complaints of cough.  She reported her cough started 1 week ago and she has had clear sputum.  Patient states on Tuesday she went to her her primary care provider and was diagnosed with pneumonia.  She was placed on steroids and antibiotics.  She states since Tuesday she has not improved and her cough has continued.   She has also had accompanying shortness of breath, nausea, and vomiting as well as a fever of 102.1 and chills.  She clarified that the nausea and vomiting has come from her coughing.  She denies any aggravating or alleviating factors.  She states she has felt a \"rattle\" in her chest.  She denies any chest pain.  She is currently on 2 L nasal cannula of oxygen and does wear this at home at night.  She mentioned she followed up with Dr. rFankel last week for MAC and was told just to complete the rest of medication she had at home and she would be done with MAC treatment.  She also reported a slight headache in which she said it is normal for her to get headaches at home.    In the ED, CXR showed No new consolidations or pleural effusions are observed. The cardiac  silhouette and mediastinum are stable. No acute osseous abnormalities are identified.  All labs unremarkable upon admission except creatinine 1.2, white blood cells 15.1, respiratory panel " positive for human metapneumovirus, blood culture pending.  All labs unremarkable upon admission except blood pressure 169/79 and patient was satting 88% on room air.  Patient received DuoNeb in the ED.    Review of Systems   Constitution: Positive for chills and fever.   HENT: Negative.    Cardiovascular: Negative.    Respiratory: Positive for cough, shortness of breath and sputum production.    Skin: Negative.    Musculoskeletal: Negative.    Gastrointestinal: Positive for nausea and vomiting.   Genitourinary: Negative.    Neurological: Negative.    Psychiatric/Behavioral: Negative.          Personal History     Past Medical History:   Past Medical History:   Diagnosis Date   • Acid reflux    • Anxiety disorder    • Arthritis    • Breast cancer (CMS/HCC)    • Depression    • H/O mammogram 08/2017   • Hemorrhoid    • Hyperlipidemia    • Hypertension    • TB (tuberculosis)     per MD not TB but MAC infection       Surgical History:      Past Surgical History:   Procedure Laterality Date   • BLADDER SURGERY     • BRONCHOSCOPY N/A 9/13/2019    Procedure: BRONCHOSCOPY with bronchial washing;  Surgeon: Marnie Frankel MD;  Location: [facility] ENDOSCOPY;  Service: Pulmonary   • COLONOSCOPY     • CYST REMOVAL      Removed a fatty cyst off of her back , abstraction from [facility].    • MASTECTOMY Right    • TUBAL ABDOMINAL LIGATION             Family History: family history includes Arthritis in an other family member; Diabetes in her mother; Heart disease in an other family member. Otherwise pertinent FHx was reviewed and unremarkable.     Social History:  reports that she has quit smoking. She has never used smokeless tobacco. She reports that she does not drink alcohol or use drugs.      Medications:  Prior to Admission medications    Medication Sig Start Date End Date Taking? Authorizing Provider   amLODIPine (NORVASC) 10 MG tablet Take 1 tablet by mouth Daily With Lunch. 1/3/20  Yes Jemima Fontaine MD   anastrozole  (ARIMIDEX) 1 MG tablet Take 1 mg by mouth Daily. 3/19/19  Yes Lala Mcclure MD   azithromycin (ZITHROMAX) 250 MG tablet Take 250 mg by mouth 3 (Three) Times a Week. Every Tuesday, Thursday, and Saturday   Yes Lala Mcclure MD   cetirizine (zyrTEC) 10 MG tablet Take 10 mg by mouth Daily.   Yes Lala Mcclure MD   chlorthalidone (HYGROTON) 25 MG tablet Take 1 tablet by mouth Daily. 1/20/20  Yes Jemima Fontaine MD   docusate sodium (COLACE) 100 MG capsule Take 100 mg by mouth 2 (Two) Times a Day. 3/19/19  Yes Lala Mcclure MD   ethambutol (MYAMBUTOL) 400 MG tablet Take 400 mg by mouth 3 (Three) Times a Week. Every Monday, Wednesday, and Friday 3/22/19  Yes Lala Mcclure MD   ferrous sulfate 324 (65 Fe) MG tablet delayed-release EC tablet Take 324 mg by mouth Daily.   Yes Lala Mcclure MD   levoFLOXacin (LEVAQUIN) 500 MG tablet Take 1 tablet by mouth Daily for 10 days. 2/4/20 2/14/20 Yes Merle Whittaker PA   levothyroxine (SYNTHROID, LEVOTHROID) 100 MCG tablet Take 1 tablet by mouth Every Morning Before Breakfast. Take on empty stomach. Thyroid needs to be rechecked.  Patient taking differently: Take 75 mcg by mouth Daily. Take on empty stomach. Thyroid needs to be rechecked. 2/3/20  Yes Jemima Fontaine MD   losartan (COZAAR) 100 MG tablet Take 1 tablet by mouth Daily. 1/20/20  Yes Jemima Fontaine MD   metFORMIN ER (GLUCOPHAGE-XR) 500 MG 24 hr tablet Take 500 mg by mouth Daily With Breakfast.   Yes Lala Mcclure MD   metoclopramide (REGLAN) 10 MG tablet Take 10 mg by mouth Every Morning. 12/16/19  Yes Lala Mcclure MD   metoclopramide (REGLAN) 5 MG tablet Take 1 tablet by mouth 3 (Three) Times a Day As Needed (for nausea and vomiting).  Patient taking differently: Take 5 mg by mouth 4 (Four) Times a Day. 1/20/20  Yes Jemima Fontaine MD   mirtazapine (REMERON) 15 MG tablet Take 1 tablet by mouth Every Night. 1/3/20  Yes Jemima Fontaine  MD Margaret   pantoprazole (PROTONIX) 40 MG EC tablet Take 1 tablet by mouth Every Morning Before Breakfast. Take on an empty stomach! 1/3/20  Yes Jemima Fontaine MD   predniSONE (DELTASONE) 10 MG tablet Take 10 mg by mouth Take As Directed.   Yes Lala Mcclure MD   rifAMPin (RIFADIN) 300 MG capsule Take 300 mg by mouth 3 (Three) Times a Week.   Yes Lala Mcclure MD   rosuvastatin (CRESTOR) 10 MG tablet Take 1 tablet by mouth Every Night. 1/3/20  Yes Jemima Fontaine MD   sertraline (ZOLOFT) 100 MG tablet Take 1 tablet by mouth Every Night. 1/3/20  Yes Jemima Fontaine MD   sucralfate (CARAFATE) 1 g tablet Take 1 tablet by mouth 4 (Four) Times a Day Before Meals & at Bedtime. 1/3/20  Yes Jemima Fontaine MD   tolterodine (DETROL) 2 MG tablet Take 1 tablet by mouth 2 (Two) Times a Day. 1/3/20  Yes Jemima Fontaine MD   vitamin B-12 (CYANOCOBALAMIN) 500 MCG tablet Take 1,000 mcg by mouth Daily.   Yes aLla Mcclure MD   montelukast (SINGULAIR) 10 MG tablet Take 10 mg by mouth Every Night.  2/6/20 Yes Lala Mcclure MD   predniSONE (DELTASONE) 10 MG tablet 4 tabs daily x 2 days then 3 tabs daily x 2 days then 2 tabs daily x 2 days then 1 tab daily x 2 days 2/4/20 2/6/20 Yes Merle Whittaker PA   albuterol (PROVENTIL) (2.5 MG/3ML) 0.083% nebulizer solution ALBUTEROL SULFATE (2.5 MG/3ML) 0.083% NEBU 3/19/19 2/6/20  Lala Mcclure MD   diazePAM (VALIUM) 2 MG tablet TAKE 1 TABLET BY MOUTH EVERY 6 HOURS AS NEEDED 8/1/19 2/6/20  Minda Callejas MD   ipratropium-albuterol (DUO-NEB) 0.5-2.5 mg/3 ml nebulizer INHALE 1 VIAL EVERY 6 HOURS AS NEEDED 3/18/19 2/6/20  Provider, Historical, MD       Allergies:  No Known Allergies    Objective   Objective     Vital Signs  Temp:  [97.6 °F (36.4 °C)] 97.6 °F (36.4 °C)  Heart Rate:  [] 100  Resp:  [18-20] 20  BP: (169)/(79) 169/79  SpO2:  [88 %-94 %] 94 %  on  Flow (L/min):  [2] 2;   Device (Oxygen  Therapy): nasal cannula  Body mass index is 31.83 kg/m².    Physical Exam   Constitutional: She is oriented to person, place, and time. She appears well-developed and well-nourished.   HENT:   Head: Normocephalic and atraumatic.   Right Ear: External ear normal.   Left Ear: External ear normal.   Nose: Nose normal.   Mouth/Throat: Oropharynx is clear and moist.   Eyes: Pupils are equal, round, and reactive to light. Conjunctivae and EOM are normal.   Neck: Normal range of motion.   Cardiovascular: Normal rate, regular rhythm and normal heart sounds.   S1, S2 audible   Pulmonary/Chest: Effort normal.   Dry cough noted,  coarse breath sounds noted throughout all lobes, patient currently on 2 L NC and wears this at home.    Abdominal: Soft. Bowel sounds are normal.   Musculoskeletal: Normal range of motion.   Neurological: She is alert and oriented to person, place, and time.   Skin: Skin is warm and dry.   Fragile   Psychiatric: She has a normal mood and affect. Her behavior is normal. Judgment and thought content normal.   Vitals reviewed.      Results Review:  I have personally reviewed most recent lab results and agree with findings.    Results from last 7 days   Lab Units 02/06/20  1405   WBC 10*3/mm3 15.10*   HEMOGLOBIN g/dL 11.7*   HEMATOCRIT % 36.0   PLATELETS 10*3/mm3 171     Results from last 7 days   Lab Units 02/06/20  1409 02/06/20  1405   SODIUM mmol/L  --  142   POTASSIUM mmol/L  --  4.0   CHLORIDE mmol/L  --  105   CO2 mmol/L  --  21.0*   BUN mg/dL  --  22   CREATININE mg/dL  --  1.21*   GLUCOSE mg/dL  --  116*   CALCIUM mg/dL  --  8.9   ALT (SGPT) U/L  --  10   AST (SGOT) U/L  --  16   LACTATE mmol/L 1.4  --      Estimated Creatinine Clearance: 38.7 mL/min (A) (by C-G formula based on SCr of 1.21 mg/dL (H)).  Brief Urine Lab Results  (Last result in the past 365 days)      Color   Clarity   Blood   Leuk Est   Nitrite   Protein   CREAT   Urine HCG        01/03/20 1204             83.4                    Microbiology Results (last 10 days)     Procedure Component Value - Date/Time    Respiratory Panel, PCR - Swab, Nasopharynx [[id number]]  (Abnormal) Collected:  02/06/20 1412    Lab Status:  Final result Specimen:  Swab from Nasopharynx Updated:  02/06/20 1611     ADENOVIRUS, PCR Not Detected     Coronavirus 229E Not Detected     Coronavirus HKU1 Not Detected     Coronavirus NL63 Not Detected     Coronavirus OC43 Not Detected     Human Metapneumovirus Detected     Human Rhinovirus/Enterovirus Not Detected     Influenza B PCR Not Detected     Parainfluenza Virus 1 Not Detected     Parainfluenza Virus 2 Not Detected     Parainfluenza Virus 3 Not Detected     Parainfluenza Virus 4 Not Detected     Bordetella pertussis pcr Not Detected     Influenza A H1 2009 PCR Not Detected     Chlamydophila pneumoniae PCR Not Detected     Mycoplasma pneumo by PCR Not Detected     Influenza A PCR Not Detected     Influenza A H3 Not Detected     Influenza A H1 Not Detected     RSV, PCR Not Detected          ECG/EMG Results (most recent)     None                    Xr Chest 1 View    Result Date: 2/6/2020  There is no significant change when compared to the prior study. There is no evidence for acute cardiopulmonary process.  Electronically Signed By-Frederick Lucas On:2/6/2020 3:54 PM This report was finalized on [id number] by  Frederick Lucas, .    Xr Chest Pa & Lateral    Result Date: 2/4/2020  1. No acute cardiopulmonary abnormality. 2. Small hiatal hernia.  Electronically Signed By-Antony Lopez On:2/4/2020 9:40 PM This report was finalized on [id number] by  Antony Lopez, .        Estimated Creatinine Clearance: 38.7 mL/min (A) (by C-G formula based on SCr of 1.21 mg/dL (H)).    Assessment/Plan   Assessment/Plan       Active Hospital Problems    Diagnosis  POA   • **Acute bronchiolitis due to human metapneumovirus [J21.1]  Yes     Priority: High   • Constipation [K59.00]  Yes     Priority: Low   • Overactive bladder  [N32.81]  Yes     Priority: Low   • Vitamin B 12 deficiency [E53.8]  Yes     Priority: Low   • DM hyperosmolarity type II (CMS/HCC) [E11.00]  Yes     Priority: Low   • Seasonal allergies [J30.2]  Yes     Priority: Low   • Depression [F32.9]  Yes     Priority: Low   • Hypertension [I10]  Yes     Priority: Low   • Hyperlipidemia [E78.5]  Yes     Priority: Low   • Mycobacterium avium complex (CMS/HCC) [A31.0]  Unknown     Priority: Low   • Breast cancer (CMS/[facility]) [C50.919]  Yes     Priority: Low   • Chronic lymphoid leukemia (CMS/[facility]) [C91.10]  Yes     Priority: Low   • Hypothyroidism [E03.9]  Yes     Priority: Low      Resolved Hospital Problems   No resolved problems to display.       Acute bronchitis secondary to human metapneumovirus   -Patient currently on 2 L nasal cannula of oxygen and wears this at home at night  -CXR showed No new consolidations or pleural effusions are observed. The cardiac  silhouette and mediastinum are stable. No acute osseous abnormalities are identified.   -White blood cells 15.1, trend  -Respiratory panel positive for human metapneumovirus  -Blood cultures pending  -Patient received DuoNeb in the ED  - Droplet precautions  - Duoneb, tesslon perles, mucinex, and solumedrol ordered     Acute kidney injury  -  CR 1.2- monitor  - Baseline CR 0.9   - Avoid nephrotoxic medications  - Gentle IV hydration ordered     Acute on chronic headache  -Patient states she gets headaches a lot at home and currently has a headache in which she believes is due to her coughing  -IV Compazine and IV Benadryl ordered-low-dose due to patient being geriatric    Essential hypertension   - Uncontrolled blood pressure 169/79   - Monitor blood pressure   -Continue amlodipine, holding home losartan due to LEXUS  - IV hydralazine ordered PRN     MAC  -Managed by Dr. Frankel outpatient-patient recently told she just needs to complete the rest of her medications that she has at home and she will be completed with her MAC  treatment  -Patient stated she recently did a sleep study on Monday  -Patient wears 2 L nasal cannula at night  -Continue azithromycin, ethambutol, rifampin     Diabetes mellitus type 2  -Start sliding scale, Accuchecks ACHS  -Holding home metformin  -Hemoglobin A1C 5.9     Hyperlipidemia  - Continue rosuvastatin     Hypothyroidism  -Continue levothyroxine    Breast cancer status post right mastectomy  -Managed by Dr. Palmer outpatient  -Continue Arimidex    Chronic lymphoid leukemia  - Monitor CBC  -Managed by Dr. Palmer outpatient    Chronic constipation  -Continue Reglan and Colace    GERD  -Continue pantoprazole and carafate    Seasonal allergies  - Continue zyrtec     Depression  - Stable  - Continue remeron and zoloft     Overactive bladder  - Continue detrol     Vitamin B 12 deficiency  - Continue vitamin B 12                 VTE Prophylaxis - Heparin      CODE STATUS:    Code Status and Medical Interventions:   Ordered at: 02/06/20 2464     Level Of Support Discussed With:    Patient     Code Status:    CPR     Medical Interventions (Level of Support Prior to Arrest):    Full       Admission Status:  I believe this patient meets Observation  criteria.      I discussed the patient's findings and my recommendations with patient and family.        Electronically signed by SIOMARA Duran, 02/06/20, 5:52 PM.  [facility] Hospitalist Team

## 2020-02-06 NOTE — ED NOTES
Pt presents with SOA and worsening cough. Recent dx of pnx. Has been on Levaquin since Tuesday with no relief.      Ivan Whitaker RN  02/06/20 4759

## 2020-02-07 LAB
GLUCOSE BLDC GLUCOMTR-MCNC: 120 MG/DL (ref 70–105)
GLUCOSE BLDC GLUCOMTR-MCNC: 159 MG/DL (ref 70–105)
GLUCOSE BLDC GLUCOMTR-MCNC: 192 MG/DL (ref 70–105)
GLUCOSE BLDC GLUCOMTR-MCNC: 208 MG/DL (ref 70–105)
HBA1C MFR BLD: 5.7 % (ref 3.5–5.6)
LAB AP CASE REPORT: NORMAL
PATH REPORT.FINAL DX SPEC: NORMAL

## 2020-02-07 PROCEDURE — 82962 GLUCOSE BLOOD TEST: CPT

## 2020-02-07 PROCEDURE — 94799 UNLISTED PULMONARY SVC/PX: CPT

## 2020-02-07 PROCEDURE — 25010000002 METHYLPREDNISOLONE PER 40 MG: Performed by: NURSE PRACTITIONER

## 2020-02-07 PROCEDURE — 97161 PT EVAL LOW COMPLEX 20 MIN: CPT

## 2020-02-07 PROCEDURE — 99232 SBSQ HOSP IP/OBS MODERATE 35: CPT | Performed by: HOSPITALIST

## 2020-02-07 PROCEDURE — 25010000002 PROCHLORPERAZINE 10 MG/2ML SOLUTION: Performed by: PHYSICIAN ASSISTANT

## 2020-02-07 PROCEDURE — 25010000002 HEPARIN (PORCINE) PER 1000 UNITS: Performed by: NURSE PRACTITIONER

## 2020-02-07 PROCEDURE — 97530 THERAPEUTIC ACTIVITIES: CPT

## 2020-02-07 PROCEDURE — 63710000001 INSULIN LISPRO (HUMAN) PER 5 UNITS: Performed by: NURSE PRACTITIONER

## 2020-02-07 PROCEDURE — 25010000002 ONDANSETRON PER 1 MG: Performed by: NURSE PRACTITIONER

## 2020-02-07 RX ORDER — PROCHLORPERAZINE EDISYLATE 5 MG/ML
5 INJECTION INTRAMUSCULAR; INTRAVENOUS ONCE
Status: COMPLETED | OUTPATIENT
Start: 2020-02-07 | End: 2020-02-07

## 2020-02-07 RX ORDER — IPRATROPIUM BROMIDE AND ALBUTEROL SULFATE 2.5; .5 MG/3ML; MG/3ML
3 SOLUTION RESPIRATORY (INHALATION)
Status: DISCONTINUED | OUTPATIENT
Start: 2020-02-07 | End: 2020-02-12 | Stop reason: HOSPADM

## 2020-02-07 RX ADMIN — GUAIFENESIN 1200 MG: 600 TABLET, EXTENDED RELEASE ORAL at 09:03

## 2020-02-07 RX ADMIN — ANASTROZOLE 1 MG: 1 TABLET ORAL at 09:03

## 2020-02-07 RX ADMIN — IPRATROPIUM BROMIDE AND ALBUTEROL SULFATE 3 ML: .5; 3 SOLUTION RESPIRATORY (INHALATION) at 19:03

## 2020-02-07 RX ADMIN — Medication 10 ML: at 21:28

## 2020-02-07 RX ADMIN — CETIRIZINE HYDROCHLORIDE 5 MG: 10 TABLET, FILM COATED ORAL at 09:03

## 2020-02-07 RX ADMIN — BENZONATATE 200 MG: 100 CAPSULE ORAL at 14:16

## 2020-02-07 RX ADMIN — SUCRALFATE 1 G: 1 TABLET ORAL at 12:03

## 2020-02-07 RX ADMIN — METHYLPREDNISOLONE SODIUM SUCCINATE 40 MG: 40 INJECTION, POWDER, FOR SOLUTION INTRAMUSCULAR; INTRAVENOUS at 19:38

## 2020-02-07 RX ADMIN — METHYLPREDNISOLONE SODIUM SUCCINATE 40 MG: 40 INJECTION, POWDER, FOR SOLUTION INTRAMUSCULAR; INTRAVENOUS at 05:52

## 2020-02-07 RX ADMIN — SUCRALFATE 1 G: 1 TABLET ORAL at 19:38

## 2020-02-07 RX ADMIN — METOCLOPRAMIDE 7.5 MG: 5 TABLET ORAL at 05:52

## 2020-02-07 RX ADMIN — HEPARIN SODIUM 5000 UNITS: 5000 INJECTION INTRAVENOUS; SUBCUTANEOUS at 09:02

## 2020-02-07 RX ADMIN — INSULIN LISPRO 2 UNITS: 100 INJECTION, SOLUTION INTRAVENOUS; SUBCUTANEOUS at 12:38

## 2020-02-07 RX ADMIN — DOCUSATE SODIUM 100 MG: 100 CAPSULE, LIQUID FILLED ORAL at 09:02

## 2020-02-07 RX ADMIN — PROCHLORPERAZINE EDISYLATE 5 MG: 5 INJECTION INTRAMUSCULAR; INTRAVENOUS at 23:08

## 2020-02-07 RX ADMIN — PANTOPRAZOLE SODIUM 40 MG: 40 TABLET, DELAYED RELEASE ORAL at 09:02

## 2020-02-07 RX ADMIN — Medication 10 ML: at 09:09

## 2020-02-07 RX ADMIN — SODIUM CHLORIDE 75 ML/HR: 900 INJECTION, SOLUTION INTRAVENOUS at 23:08

## 2020-02-07 RX ADMIN — ETHAMBUTOL HYDROCHLORIDE 400 MG: 400 TABLET, FILM COATED ORAL at 09:03

## 2020-02-07 RX ADMIN — DOCUSATE SODIUM 100 MG: 100 CAPSULE, LIQUID FILLED ORAL at 21:26

## 2020-02-07 RX ADMIN — SUCRALFATE 1 G: 1 TABLET ORAL at 16:30

## 2020-02-07 RX ADMIN — LEVOTHYROXINE SODIUM 100 MCG: 100 TABLET ORAL at 09:02

## 2020-02-07 RX ADMIN — INSULIN LISPRO 2 UNITS: 100 INJECTION, SOLUTION INTRAVENOUS; SUBCUTANEOUS at 17:09

## 2020-02-07 RX ADMIN — MIRTAZAPINE 15 MG: 15 TABLET, FILM COATED ORAL at 21:26

## 2020-02-07 RX ADMIN — ONDANSETRON 4 MG: 2 INJECTION INTRAMUSCULAR; INTRAVENOUS at 19:41

## 2020-02-07 RX ADMIN — IPRATROPIUM BROMIDE AND ALBUTEROL SULFATE 3 ML: .5; 3 SOLUTION RESPIRATORY (INHALATION) at 15:35

## 2020-02-07 RX ADMIN — CYANOCOBALAMIN TAB 1000 MCG 1000 MCG: 1000 TAB at 09:03

## 2020-02-07 RX ADMIN — SODIUM CHLORIDE 75 ML/HR: 900 INJECTION, SOLUTION INTRAVENOUS at 10:31

## 2020-02-07 RX ADMIN — IPRATROPIUM BROMIDE AND ALBUTEROL SULFATE 3 ML: .5; 3 SOLUTION RESPIRATORY (INHALATION) at 12:12

## 2020-02-07 RX ADMIN — IPRATROPIUM BROMIDE AND ALBUTEROL SULFATE 3 ML: .5; 3 SOLUTION RESPIRATORY (INHALATION) at 07:47

## 2020-02-07 RX ADMIN — ACETAMINOPHEN 650 MG: 325 TABLET, FILM COATED ORAL at 16:29

## 2020-02-07 RX ADMIN — SERTRALINE 100 MG: 100 TABLET, FILM COATED ORAL at 21:26

## 2020-02-07 RX ADMIN — IPRATROPIUM BROMIDE AND ALBUTEROL SULFATE 3 ML: .5; 3 SOLUTION RESPIRATORY (INHALATION) at 23:57

## 2020-02-07 RX ADMIN — BENZONATATE 200 MG: 100 CAPSULE ORAL at 05:56

## 2020-02-07 RX ADMIN — POLYETHYLENE GLYCOL 3350 17 G: 17 POWDER, FOR SOLUTION ORAL at 12:02

## 2020-02-07 RX ADMIN — AMLODIPINE BESYLATE 10 MG: 5 TABLET ORAL at 12:03

## 2020-02-07 RX ADMIN — RIFAMPIN 300 MG: 300 CAPSULE ORAL at 09:03

## 2020-02-07 RX ADMIN — ROSUVASTATIN CALCIUM 10 MG: 10 TABLET, FILM COATED ORAL at 21:26

## 2020-02-07 RX ADMIN — OXYBUTYNIN CHLORIDE 10 MG: 10 TABLET, EXTENDED RELEASE ORAL at 09:03

## 2020-02-07 RX ADMIN — INSULIN LISPRO 6 UNITS: 100 INJECTION, SOLUTION INTRAVENOUS; SUBCUTANEOUS at 21:26

## 2020-02-07 RX ADMIN — METHYLPREDNISOLONE SODIUM SUCCINATE 40 MG: 40 INJECTION, POWDER, FOR SOLUTION INTRAMUSCULAR; INTRAVENOUS at 12:03

## 2020-02-07 RX ADMIN — HEPARIN SODIUM 5000 UNITS: 5000 INJECTION INTRAVENOUS; SUBCUTANEOUS at 21:26

## 2020-02-07 RX ADMIN — GUAIFENESIN 1200 MG: 600 TABLET, EXTENDED RELEASE ORAL at 21:26

## 2020-02-07 RX ADMIN — FERROUS SULFATE TAB EC 324 MG (65 MG FE EQUIVALENT) 324 MG: 324 (65 FE) TABLET DELAYED RESPONSE at 09:03

## 2020-02-07 NOTE — PLAN OF CARE
Problem: Patient Care Overview  Goal: Plan of Care Review  Outcome: Ongoing (interventions implemented as appropriate)  Flowsheets (Taken 2/7/2020 5248)  Progress: improving  Plan of Care Reviewed With: patient  Outcome Summary: Pt is 78 yo female admitted for SOA, acute bronchitis, and hypoxemia, and tested (+) for metapneumovirus.  Pt able to complete bed mobility with indep, transfers with SBA-CGA.  Pt able to ambulate 100 ft with RW and CGA.  Pt appears close to functional mobility baseline with some decreased strength and endurance.  PT will follow 3x/week while at [facility].  It is anticipated pt will improve mobility and be able to return home with , use of RW, and HHPT.

## 2020-02-07 NOTE — PLAN OF CARE
Problem: Patient Care Overview  Goal: Plan of Care Review  Flowsheets  Taken 2/7/2020 0351 by Jin Mcgrath LPN  Progress: no change  Taken 2/6/2020 1934 by Bailey Pa RN  Plan of Care Reviewed With: patient;daughter     Problem: Fall Risk (Adult)  Goal: Identify Related Risk Factors and Signs and Symptoms  Flowsheets (Taken 2/7/2020 0351)  Related Risk Factors (Fall Risk): age-related changes; inadequate lighting; environment unfamiliar  Goal: Absence of Fall  Flowsheets (Taken 2/7/2020 0351)  Absence of Fall: making progress toward outcome     Problem: Breathing Pattern Ineffective (Adult)  Goal: Identify Related Risk Factors and Signs and Symptoms  Flowsheets (Taken 2/7/2020 0351)  Related Risk Factors (Breathing Pattern Ineffective): advanced age; infection; underlying condition  Signs and Symptoms (Breathing Pattern Ineffective): anxiousness; cough ineffective; restlessness; activity intolerance  Goal: Effective Oxygenation/Ventilation  Flowsheets (Taken 2/7/2020 0351)  Effective Oxygenation/Ventilation: making progress toward outcome  Goal: Anxiety/Fear Reduction  Flowsheets (Taken 2/7/2020 0351)  Anxiety/Fear Reduction: making progress toward outcome

## 2020-02-07 NOTE — PROGRESS NOTES
"      [facility] Medicine Services Daily Progress Note      Hospitalist Team  LOS 0 days      Patient Care Team:  Jemima Fontaine MD as PCP - General (Family Medicine)    Patient Location: 203/1      Subjective   Subjective     Chief Complaint / Subjective  Chief Complaint   Patient presents with   • Cough         Brief Synopsis of Hospital Course/HPI  Patient is a 79-year-old female with a history of breast cancer, CLL, HTN, HLD, type II DM, GERD, depression and is undergoing current treatment for MAC pneumonia who presented to the emergency room complaining of cough and headache that began approximately 1 week ago.  Outpatient treatment with antibiotics and steroids did not help and she came to the emergency room where she was diagnosed with human metapneumovirus infection.  She has had some nausea and vomiting with no hematemesis or coffee-ground emesis.  She complains of acute on chronic constipation as well, and she reports that she normally takes Colace and polyethylene glycol at home.    Date: 2/7/2020: Patient reports persistent chest congestion and cough with wheezing.  She reports that she vomited after she ate a turkey sandwich last night but she was able to keep down an omelette with schreiber this morning.  She uses nasal cannula O2 at night at home normally.      ROS  12 point review of systems was reviewed and was negative except as above.    Objective   Objective      Vital Signs  Temp:  [97.6 °F (36.4 °C)-99 °F (37.2 °C)] 97.8 °F (36.6 °C)  Heart Rate:  [] 86  Resp:  [17-26] 17  BP: (152-170)/(68-83) 167/68  Oxygen Therapy  SpO2: 92 %  Pulse Oximetry Type: Intermittent  Device (Oxygen Therapy): nasal cannula  Flow (L/min): 2  Oxygen Concentration (%): 28  Flowsheet Rows      First Filed Value   Admission Height  160 cm (63\") Documented at 02/06/2020 1248   Admission Weight  81.5 kg (179 lb 10.8 oz) Documented at 02/06/2020 1248        Intake & Output (last 3 days)       " 02/04 0701 - 02/05 0700 02/05 0701 - 02/06 0700 02/06 0701 - 02/07 0700 02/07 0701 - 02/08 0700            Urine Unmeasured Occurrence   1 x         Lines, Drains & Airways    Active LDAs     Name:   Placement date:   Placement time:   Site:   Days:    Peripheral IV 02/06/20 1403 Left Forearm   02/06/20    1403    Forearm   less than 1                  Physical Exam:    Physical Exam    Well-developed well-nourished in no acute distress sitting up in bed double on nasal cannula O2 awake and alert with intermittent rattling cough; mucous membranes moist; sclerae anicteric; lungs with diffuse coarse rhonchi and expiratory wheezes bilaterally; CV regular rate and rhythm; abdomen soft nontender nondistended with active bowel sounds; extremities with no edema, cyanosis or calf tenderness; palpable pedal pulses bilaterally; no Bellamy catheter.      Procedures:              Results Review:     I reviewed the patient's new clinical results.      Lab Results (last 24 hours)     Procedure Component Value Units Date/Time    Pathology Consultation [[id number]] Collected:  02/06/20 1405    Specimen:  Blood, Venous Line Updated:  02/07/20 0936    POC Glucose Once [[id number]]  (Abnormal) Collected:  02/07/20 0729    Specimen:  Blood Updated:  02/07/20 0732     Glucose 120 mg/dL      Comment: Serial Number: [id number]:  [id number]       Blood Culture - Blood, Arm, Left [[id number]] Collected:  02/06/20 1434    Specimen:  Blood from Arm, Left Updated:  02/07/20 0245     Blood Culture No growth at less than 24 hours    Blood Culture - Blood, Arm, Left [[id number]] Collected:  02/06/20 1405    Specimen:  Blood from Arm, Left Updated:  02/07/20 0215     Blood Culture No growth at less than 24 hours    POC Glucose Once [[id number]]  (Abnormal) Collected:  02/06/20 1935    Specimen:  Blood Updated:  02/06/20 1936     Glucose 212 mg/dL      Comment: Serial Number: [id number]:  [id number]       Hemoglobin A1c [[id number]] Collected:   02/06/20 1405    Specimen:  Blood Updated:  02/06/20 1920    Respiratory Panel, PCR - Swab, Nasopharynx [[id number]]  (Abnormal) Collected:  02/06/20 1412    Specimen:  Swab from Nasopharynx Updated:  02/06/20 1611     ADENOVIRUS, PCR Not Detected     Coronavirus 229E Not Detected     Coronavirus HKU1 Not Detected     Coronavirus NL63 Not Detected     Coronavirus OC43 Not Detected     Human Metapneumovirus Detected     Human Rhinovirus/Enterovirus Not Detected     Influenza B PCR Not Detected     Parainfluenza Virus 1 Not Detected     Parainfluenza Virus 2 Not Detected     Parainfluenza Virus 3 Not Detected     Parainfluenza Virus 4 Not Detected     Bordetella pertussis pcr Not Detected     Influenza A H1 2009 PCR Not Detected     Chlamydophila pneumoniae PCR Not Detected     Mycoplasma pneumo by PCR Not Detected     Influenza A PCR Not Detected     Influenza A H3 Not Detected     Influenza A H1 Not Detected     RSV, PCR Not Detected    TSH [[id number]]  (Normal) Collected:  02/06/20 1405    Specimen:  Blood Updated:  02/06/20 1547     TSH 1.610 uIU/mL     Scan Slide [[id number]] Collected:  02/06/20 1405    Specimen:  Blood Updated:  02/06/20 1518     Scan Slide --     Comment: See Manual Differential Results       Manual Differential [[id number]]  (Abnormal) Collected:  02/06/20 1405    Specimen:  Blood Updated:  02/06/20 1518     Neutrophil % 16.0 %      Lymphocyte % 70.0 %      Bands %  14.0 %      Neutrophils Absolute 4.53 10*3/mm3      Lymphocytes Absolute 10.57 10*3/mm3      Anisocytosis Slight/1+     Smudge Cells Slight/1+     Platelet Morphology Normal    Path Consult Reflex [[id number]] Collected:  02/06/20 1405    Specimen:  Blood Updated:  02/06/20 1518     Pathology Review Yes    CBC & Differential [[id number]] Collected:  02/06/20 1405    Specimen:  Blood Updated:  02/06/20 1518    Narrative:       The following orders were created for panel order CBC & Differential.  Procedure                                Abnormality         Status                     ---------                               -----------         ------                     CBC Auto Differential[[id number]]        Abnormal            Final result                 Please view results for these tests on the individual orders.    CBC Auto Differential [[id number]]  (Abnormal) Collected:  02/06/20 1405    Specimen:  Blood Updated:  02/06/20 1518     WBC 15.10 10*3/mm3      RBC 4.31 10*6/mm3      Hemoglobin 11.7 g/dL      Hematocrit 36.0 %      MCV 83.6 fL      MCH 27.1 pg      MCHC 32.4 g/dL      RDW 15.8 %      RDW-SD 46.4 fl      MPV 9.9 fL      Platelets 171 10*3/mm3     Comprehensive Metabolic Panel [[id number]]  (Abnormal) Collected:  02/06/20 1405    Specimen:  Blood Updated:  02/06/20 1431     Glucose 116 mg/dL      BUN 22 mg/dL      Creatinine 1.21 mg/dL      Sodium 142 mmol/L      Potassium 4.0 mmol/L      Chloride 105 mmol/L      CO2 21.0 mmol/L      Calcium 8.9 mg/dL      Total Protein 6.8 g/dL      Albumin 4.10 g/dL      ALT (SGPT) 10 U/L      AST (SGOT) 16 U/L      Alkaline Phosphatase 128 U/L      Total Bilirubin 0.2 mg/dL      eGFR Non African Amer 43 mL/min/1.73      Globulin 2.7 gm/dL      A/G Ratio 1.5 g/dL      BUN/Creatinine Ratio 18.2     Anion Gap 16.0 mmol/L     Narrative:       GFR Normal >60  Chronic Kidney Disease <60  Kidney Failure <15      POC Lactate [[id number]]  (Normal) Collected:  02/06/20 1409    Specimen:  Blood Updated:  02/06/20 1410     Lactate 1.4 mmol/L      Comment: Serial Number: [id number]:  [id number]           No results found for: HGBA1C            No results found for: LIPASE  Lab Results   Component Value Date    CHOL 374 (H) 01/03/2020    TRIG 311 (H) 01/03/2020    HDL 50 01/03/2020     (H) 01/03/2020       Lab Results   Lab Value Date/Time    FINALDX  06/25/2019 1136     Absolute atypical lymphocytosis suspicious for CLL/SLL.         Microbiology Results (last 10 days)     Procedure Component Value -  Date/Time    Blood Culture - Blood, Arm, Left [[id number]] Collected:  02/06/20 1434    Lab Status:  Preliminary result Specimen:  Blood from Arm, Left Updated:  02/07/20 0245     Blood Culture No growth at less than 24 hours    Respiratory Panel, PCR - Swab, Nasopharynx [[id number]]  (Abnormal) Collected:  02/06/20 1412    Lab Status:  Final result Specimen:  Swab from Nasopharynx Updated:  02/06/20 1611     ADENOVIRUS, PCR Not Detected     Coronavirus 229E Not Detected     Coronavirus HKU1 Not Detected     Coronavirus NL63 Not Detected     Coronavirus OC43 Not Detected     Human Metapneumovirus Detected     Human Rhinovirus/Enterovirus Not Detected     Influenza B PCR Not Detected     Parainfluenza Virus 1 Not Detected     Parainfluenza Virus 2 Not Detected     Parainfluenza Virus 3 Not Detected     Parainfluenza Virus 4 Not Detected     Bordetella pertussis pcr Not Detected     Influenza A H1 2009 PCR Not Detected     Chlamydophila pneumoniae PCR Not Detected     Mycoplasma pneumo by PCR Not Detected     Influenza A PCR Not Detected     Influenza A H3 Not Detected     Influenza A H1 Not Detected     RSV, PCR Not Detected    Blood Culture - Blood, Arm, Left [[id number]] Collected:  02/06/20 1405    Lab Status:  Preliminary result Specimen:  Blood from Arm, Left Updated:  02/07/20 0215     Blood Culture No growth at less than 24 hours          ECG/EMG Results (most recent)     None                    Xr Chest 1 View    Result Date: 2/6/2020  There is no significant change when compared to the prior study. There is no evidence for acute cardiopulmonary process.  Electronically Signed By-Frederick Lucas On:2/6/2020 3:54 PM This report was finalized on [id number] by  Frederick Lucas, .    Xr Chest Pa & Lateral    Result Date: 2/4/2020  1. No acute cardiopulmonary abnormality. 2. Small hiatal hernia.  Electronically Signed By-Antony Lopez On:2/4/2020 9:40 PM This report was finalized on [id number] by  Antony  Jessica .          Xrays, labs reviewed personally by physician.    Medication Review:   I have reviewed the patient's current medication list      Scheduled Meds    amLODIPine 10 mg Oral Daily With Lunch   anastrozole 1 mg Oral Daily   [START ON 2/8/2020] azithromycin 250 mg Oral Once per day on Tue Thu Sat   cetirizine 5 mg Oral Daily   docusate sodium 100 mg Oral BID   ethambutol 400 mg Oral Once per day on Mon Wed Fri   ferrous sulfate 324 mg Oral Daily   guaiFENesin 1,200 mg Oral Q12H   heparin (porcine) 5,000 Units Subcutaneous Q12H   insulin lispro 0-9 Units Subcutaneous 4x Daily With Meals & Nightly   ipratropium-albuterol 3 mL Nebulization 4x Daily - RT   levothyroxine 100 mcg Oral QAM AC   methylPREDNISolone sodium succinate 40 mg Intravenous Q8H   metoclopramide 7.5 mg Oral QAM   mirtazapine 15 mg Oral Nightly   oxybutynin XL 10 mg Oral Daily   pantoprazole 40 mg Oral QAM AC   polyethylene glycol 17 g Oral BID   rifAMPin 300 mg Oral Once per day on Mon Wed Fri   rosuvastatin 10 mg Oral Nightly   sertraline 100 mg Oral Nightly   sodium chloride 10 mL Intravenous Q12H   sucralfate 1 g Oral 4x Daily AC & at Bedtime   vitamin B-12 1,000 mcg Oral Daily       Meds Infusions    sodium chloride 75 mL/hr Last Rate: 75 mL/hr (02/07/20 1031)       Meds PRN  •  acetaminophen **OR** acetaminophen **OR** acetaminophen  •  benzonatate  •  dextrose  •  dextrose  •  diphenhydrAMINE  •  glucagon (human recombinant)  •  hydrALAZINE  •  influenza vaccine  •  insulin lispro **AND** insulin lispro  •  ondansetron **OR** ondansetron  •  sodium chloride        Assessment/Plan   Assessment/Plan     Active Hospital Problems    Diagnosis  POA   • **Acute bronchiolitis due to human metapneumovirus [J21.1]  Yes   • Constipation [K59.00]  Yes   • Overactive bladder [N32.81]  Yes   • Vitamin B 12 deficiency [E53.8]  Yes   • DM hyperosmolarity type II (CMS/HCC) [E11.00]  Yes   • Seasonal allergies [J30.2]  Yes   • Depression [F32.9]   Yes   • Hypertension [I10]  Yes   • Hyperlipidemia [E78.5]  Yes   • Mycobacterium avium complex (CMS/HCC) [A31.0]  Unknown   • Breast cancer (CMS/HCC) [C50.919]  Yes   • Chronic lymphoid leukemia (CMS/HCC) [C91.10]  Yes   • Hypothyroidism [E03.9]  Yes      Resolved Hospital Problems   No resolved problems to display.       MEDICAL DECISION MAKING COMPLEXITY BY PROBLEM:      Acute bronchitis secondary to human metapneumovirus upper respiratory infection associated high-grade fever, nausea and vomiting.  She is somewhat improved now with antiemetics and IV fluid hydration.  -Increase frequency of nebulizer treatments to every 4 hours due to persistent wheezing.      Acute kidney injury  -  CR 1.2- monitor  - Baseline CR 0.9   - Avoid nephrotoxic medications  - Gentle IV hydration ordered      Acute on chronic headache  -Patient states she gets headaches a lot at home and currently has a headache in which she believes is due to her coughing  -IV Compazine and IV Benadryl ordered-low-dose due to patient being geriatric     Essential hypertension   - Uncontrolled blood pressure 169/79   - Monitor blood pressure   -Continue amlodipine, holding home losartan due to LEXUS  - IV hydralazine ordered PRN      MAC  -Managed by Dr. Frankel outpatient-patient recently told she just needs to complete the rest of her medications that she has at home and she will be completed with her MAC treatment  -Patient stated she recently did a sleep study on Monday  -Patient wears 2 L nasal cannula at night  -Continue azithromycin, ethambutol, rifampin      Diabetes mellitus type 2  -Start sliding scale, Accuchecks ACHS  -Holding home metformin  -Hemoglobin A1C 5.9      Hyperlipidemia  - Continue rosuvastatin      Hypothyroidism  -Continue levothyroxine     Breast cancer status post right mastectomy  -Managed by Dr. Palmer outpatient  -Continue Arimidex     Chronic lymphoid leukemia  - Monitor CBC  -Managed by Dr. Palmer outpatient     Chronic  constipation  -Continue polyethylene glycol, metoclopramide and Colace     GERD  -Continue pantoprazole and carafate     Seasonal allergies  - Continue zyrtec      Depression, chronic  - Stable  - Continue remeron and zoloft      Overactive bladder  - Continue detrol      Vitamin B 12 deficiency  - Continue vitamin B 12        VTE Prophylaxis - Heparin        Mechanical Order History:     None      Pharmalogical Order History:     Ordered     Dose Route Frequency Stop    02/06/20 1938  heparin (porcine) 5000 UNIT/ML injection 5,000 Units      5,000 Units SC Every 12 Hours Scheduled --            Code Status -   Code Status and Medical Interventions:   Ordered at: 02/06/20 1825     Level Of Support Discussed With:    Patient     Code Status:    CPR     Medical Interventions (Level of Support Prior to Arrest):    Full       Discharge Planning    Home in 1 to 2 days if clinically improved.      Destination      Coordination has not been started for this encounter.      Durable Medical Equipment      Coordination has not been started for this encounter.      Dialysis/Infusion      Coordination has not been started for this encounter.      Home Medical Care      Coordination has not been started for this encounter.      Therapy      Coordination has not been started for this encounter.      Community Resources      Coordination has not been started for this encounter.            Electronically signed by Ysabel Rubi MD, 02/07/20, 10:32 AM.  [facility] Hospitalist Team

## 2020-02-07 NOTE — PROGRESS NOTES
Continued Stay Note  KELLY Bolton     Patient Name: Diane Guan  MRN: [id number]  Today's Date: 2/7/2020    Admit Date: 2/6/2020    Discharge Plan     Row Name 02/07/20 1434       Plan    Plan  From home w/spouse. Home O2 at HS, no nebulizer. PT/OT pending.                     Tejal Justin RN

## 2020-02-07 NOTE — PLAN OF CARE
Patient not c/o any pain. Still coughing a lot. Taking tessalon pearls for cough. Will continue to monitor.

## 2020-02-07 NOTE — THERAPY EVALUATION
Patient Name: Diane Guan  : 1940    MRN: [id number]                              Today's Date: 2020       Admit Date: 2020    Visit Dx:     ICD-10-CM ICD-9-CM   1. Acute bronchiolitis due to human metapneumovirus J21.1 466.19   2. Hypothyroidism, unspecified type E03.9 244.9   3. Hypoxemia R09.02 799.02     Patient Active Problem List   Diagnosis   • Abnormal mammogram   • Abnormality of gait   • Anxiety disorder   • Arthralgia   • Arthritis   • Breast cancer (CMS/HCC)   • Chronic lymphoid leukemia (CMS/HCC)   • Depression   • Gallbladder disorder   • Gastroesophageal reflux disease with hiatal hernia   • General medical exam   • Hay fever   • Hematochezia   • Hyperlipidemia   • Hypertension   • Hypothyroidism   • Impaired glucose tolerance   • Increased frequency of urination   • Insomnia   • Leukocytosis   • Mycobacterium avium complex (CMS/HCC)   • Postmenopausal status   • Shoulder pain   • Dependence on supplemental oxygen   • Acute bronchiolitis due to human metapneumovirus   • Constipation   • Overactive bladder   • Vitamin B 12 deficiency   • DM hyperosmolarity type II (CMS/HCC)   • Seasonal allergies     Past Medical History:   Diagnosis Date   • Acid reflux    • Anxiety disorder    • Arthritis    • Breast cancer (CMS/HCC)    • Depression    • H/O mammogram 2017   • Hemorrhoid    • Hyperlipidemia    • Hypertension    • TB (tuberculosis)     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    • MASTECTOMY Right    • TUBAL ABDOMINAL LIGATION       General Information     Row Name 20 1512          PT Evaluation Time/Intention    Document Type  evaluation  -HD      Mode of Treatment  physical therapy  -HD     Row Name 02/07/20 1512          General Information    Patient Profile Reviewed?  yes  -HD     Prior Level of Function  independent: 2L O2 as needed  -HD     Existing Precautions/Restrictions  fall;oxygen therapy device and L/min  -HD     Row Name 02/07/20 1512          Relationship/Environment    Lives With  spouse  -HD     Row Name 02/07/20 1512          Resource/Environmental Concerns    Current Living Arrangements  home/apartment/condo  -HD     Row Name 02/07/20 1512          Home Main Entrance    Number of Stairs, Main Entrance  two  -HD     Row Name 02/07/20 1512          Cognitive Assessment/Intervention- PT/OT    Orientation Status (Cognition)  oriented x 4  -HD     Row Name 02/07/20 1512          Safety Issues, Functional Mobility    Impairments Affecting Function (Mobility)  balance;endurance/activity tolerance;strength;shortness of breath  -HD       User Key  (r) = Recorded By, (t) = Taken By, (c) = Cosigned By    Initials Name Provider Type    HD Cecelia Linder, PT Physical Therapist        Mobility     Row Name 02/07/20 1513          Bed Mobility Assessment/Treatment    Bed Mobility Assessment/Treatment  supine-sit  -HD     Supine-Sit [place] (Bed Mobility)  independent  -HD     Row Name 02/07/20 1513          Sit-Stand Transfer    Sit-Stand [place] (Transfers)  contact guard  -HD     Assistive Device (Sit-Stand Transfers)  walker, front-wheeled  -HD     Row Name 02/07/20 1513          Gait/Stairs Assessment/Training    Gait/Stairs Assessment/Training  gait/ambulation independence;gait/ambulation assistive device  -HD     [place] Level (Gait)  contact guard  -HD     Assistive Device (Gait)  walker, front-wheeled  -HD     Distance in Feet (Gait)  100 ft  -HD     Comment (Gait/Stairs)  slow jey, increased reliance on RW for support  -HD       User Key  (r) = Recorded By, (t) = Taken By, (c) = Cosigned By    Initials Name Provider Type     Cecelia Mcclellan, PT Physical Therapist        Obj/Interventions     Row Name 02/07/20 1518          General ROM    GENERAL ROM COMMENTS  LUIS ENRIQUEIqra WFL, KODYs WFL  -HD     Row Name 02/07/20 1518          MMT (Manual Muscle Testing)    General MMT Comments  Wanda 4-/5 MMT, Jyoti 4/5 MMT  -HD     Row Name 02/07/20 1518          Static Sitting Balance    Level of [place] (Unsupported Sitting, Static Balance)  supervision  -HD     Sitting Position (Unsupported Sitting, Static Balance)  sitting on edge of bed  -HD     Time Able to Maintain Position (Unsupported Sitting, Static Balance)  2 to 3 minutes  -HD     Row Name 02/07/20 1518          Dynamic Sitting Balance    Level of [place], Reaches Outside Midline (Sitting, Dynamic Balance)  supervision  -HD     Sitting Position, Reaches Outside Midline (Sitting, Dynamic Balance)  sitting on edge of bed  -HD     Row Name 02/07/20 1518          Static Standing Balance    Level of [place] (Supported Standing, Static Balance)  contact guard assist  -HD     Time Able to Maintain Position (Supported Standing, Static Balance)  2 to 3 minutes  -HD     Assistive Device Utilized (Supported Standing, Static Balance)  walker, rolling  -HD     Row Name 02/07/20 1518          Dynamic Standing Balance    Level of [place], Reaches Outside Midline (Standing, Dynamic Balance)  contact guard assist  -HD     Time Able to Maintain Position, Reaches Outside Midline (Standing, Dynamic Balance)  3 to 4 minutes  -HD     Assistive Device Utilized (Supported Standing, Dynamic Balance)  walker, rolling  -HD       User Key  (r) = Recorded By, (t) = Taken By, (c) = Cosigned By    Initials Name Provider Type     Cecelia Linder, PT Physical Therapist        Goals/Plan     Row Name 02/07/20 1529          Transfer Goal 1 (PT)    Activity/Assistive Device (Transfer Goal 1, PT)  transfers, all  -HD     [place] Level/Cues Needed (Transfer Goal 1, PT)  independent  -HD     Time Frame  (Transfer Goal 1, PT)  2 weeks  -HD     Row Name 02/07/20 1529          Gait Training Goal 1 (PT)    Activity/Assistive Device (Gait Training Goal 1, PT)  gait (walking locomotion);assistive device use  -HD     [place] Level (Gait Training Goal 1, PT)  conditional independence  -HD     Distance (Gait Goal 1, PT)  200 ft while maintaining O2 >90%  -HD     Row Name 02/07/20 1529          Stairs Goal 1 (PT)    Activity/Assistive Device (Stairs Goal 1, PT)  stairs, all skills  -HD     [place] Level/Cues Needed (Stairs Goal 1, PT)  conditional independence  -HD     Number of Stairs (Stairs Goal 1, PT)  2 steps   -HD     Time Frame (Stairs Goal 1, PT)  2 weeks  -HD       User Key  (r) = Recorded By, (t) = Taken By, (c) = Cosigned By    Initials Name Provider Type    Cecelia Mcclellan, PT Physical Therapist        Clinical Impression     Row Name 02/07/20 1530          Pain Assessment    Additional Documentation  Pain Scale: Numbers Pre/Post-Treatment (Group)  -HD     Row Name 02/07/20 1530          Pain Scale: Numbers Pre/Post-Treatment    Pain Scale: Numbers, Pretreatment  0/10 - no pain  -HD     Pain Scale: Numbers, Post-Treatment  0/10 - no pain  -HD     Row Name 02/07/20 1530          Physical Therapy Clinical Impression    Patient/Family Goals Statement (PT Clinical Impression)  Pt is 80 yo female admitted for SOA, acute bronchitis, and hypoxemia, and tested (+) for metapneumovirus.    -HD     Criteria for Skilled Interventions Met (PT Clinical Impression)  yes;treatment indicated  -HD     Rehab Potential (PT Clinical Summary)  good, to achieve stated therapy goals  -HD     Predicted Duration of Therapy (PT)  2 weeks   -HD     Row Name 02/07/20 1530          Vital Signs    Post SpO2 (%)  90  -HD     O2 Delivery Post Treatment  supplemental O2 3L  -HD     Row Name 02/07/20 1530          Positioning and Restraints    Pre-Treatment Position  in bed  -HD     Post Treatment Position  bed  -HD     In Bed   notified nsg;call light within reach;encouraged to call for assist;exit alarm on  -HD       User Key  (r) = Recorded By, (t) = Taken By, (c) = Cosigned By    Initials Name Provider Type     Cecelia Linder, PT Physical Therapist        Outcome Measures    No documentation.       Physical Therapy Education                 Title: PT OT SLP Therapies (Done)     Topic: Physical Therapy (Done)     Point: Mobility training (Done)     Description:   Instruct learner(s) on safety and technique for assisting patient out of bed, chair or wheelchair.  Instruct in the proper use of assistive devices, such as walker, crutches, cane or brace.              Patient Friendly Description:   It's important to get you on your feet again, but we need to do so in a way that is safe for you. Falling has serious consequences, and your personal safety is the most important thing of all.        When it's time to get out of bed, one of us or a family member will sit next to you on the bed to give you support.     If your doctor or nurse tells you to use a walker, crutches, a cane, or a brace, be sure you use it every time you get out of bed, even if you think you don't need it.    Learning Progress Summary           Patient Acceptance, E, VU by  at 2/7/2020 1524                   Point: Precautions (Done)     Description:   Instruct learner(s) on prescribed precautions during mobility and gait tasks              Learning Progress Summary           Patient Acceptance, E, VU by  at 2/7/2020 1524                               User Key     Initials Effective Dates Name Provider Type [facility] 03/01/19 -  Cecelia Linder, PT Physical Therapist PT              PT Recommendation and Plan  Planned Therapy Interventions (PT Eval): balance training, gait training, postural re-education, transfer training, neuromuscular re-education, patient/family education, strengthening, stair training  Outcome Summary/Treatment Plan (PT)  Anticipated  Discharge Disposition (PT): home with assist, home with home health  Plan of Care Reviewed With: patient  Progress: improving  Outcome Summary: Pt is 80 yo female admitted for SOA, acute bronchitis, and hypoxemia, and tested (+) for metapneumovirus.  Pt able to complete bed mobility with indep, transfers with SBA-CGA.  Pt able to ambulate 100 ft with RW and CGA.  Pt appears close to functional mobility baseline with some decreased strength and endurance.  PT will follow 3x/week while at [facility].  It is anticipated pt will improve mobility and be able to return home with , use of RW, and HHPT.     Time Calculation:   PT Charges     Row Name 02/07/20 1512             Time Calculation    Start Time  1508  -HD      Stop Time  1540  -HD      Time Calculation (min)  32 min  -HD      PT Received On  02/07/20  -HD      PT - Next Appointment  02/10/20  -HD      PT Goal Re-Cert Due Date  02/21/20  -HD         Time Calculation- PT    Total Timed Code Minutes- PT  15 minute(s)  -HD        User Key  (r) = Recorded By, (t) = Taken By, (c) = Cosigned By    Initials Name Provider Type    HD Cecelia Linder, PT Physical Therapist        Therapy Charges for Today     Code Description Service Date Service Provider Modifiers Qty    50149832904 HC PT EVAL LOW COMPLEXITY 3 2/7/2020 Cecelia Linder, PT GP 1    68009913773 HC PT THERAPEUTIC ACT EA 15 MIN 2/7/2020 Cecelia Linder, PT GP 1               Cecelia Linder PT  2/7/2020

## 2020-02-07 NOTE — PROGRESS NOTES
Discharge Planning Assessment  [facility]     Patient Name: Diane Guan  MRN: [id number]  Today's Date: 2/7/2020    Admit Date: 2/6/2020    Discharge Needs Assessment     Row Name 02/07/20 1424       Living Environment    Lives With  spouse    Current Living Arrangements  home/apartment/condo    Primary Care Provided by  spouse/significant other    Provides Primary Care For  no one    Family Caregiver if Needed  child(marco), adult    Able to Return to Prior Arrangements  yes       Resource/Environmental Concerns    Resource/Environmental Concerns  none       Transition Planning    Patient/Family Anticipates Transition to  home with family    Patient/Family Anticipated Services at Transition  none    Transportation Anticipated  family or friend will provide       Discharge Needs Assessment    Readmission Within the Last 30 Days  no previous admission in last 30 days    Equipment Currently Used at Home  hospital bed;glucometer;walker, rolling;oxygen Home O2 at HS per [employer] No nebulizer.    Anticipated Changes Related to Illness  none    Patient's Choice of Community Agency(s)  [facility] in the past.    Discharge Coordination/Progress  From home w/spouse, home O2 at HS, no home nebulizer. PT/OT pending.    Row Name 02/07/20 1314       Living Environment    Lives With  spouse    Unique Family Situation  Daughter Marianna Mantilla is at bedside and helps pt at home with the spouse.    Current Living Arrangements  home/apartment/condo    Primary Care Provided by  spouse/significant other;child(marco)    Provides Primary Care For  no one    Family Caregiver if Needed  child(marco), adult    Able to Return to Prior Arrangements  yes       Resource/Environmental Concerns    Resource/Environmental Concerns  none       Transition Planning    Patient/Family Anticipates Transition to  home with family    Patient/Family Anticipated Services at Transition  none    Transportation Anticipated  family or friend will provide        Discharge Needs Assessment    Readmission Within the Last 30 Days  no previous admission in last 30 days                 Demographic Summary     Row Name 02/07/20 1314       General Information    Admission Type  inpatient    Arrived From  emergency department    Required Notices Provided  Important Message from Medicare    Referral Source  admission list    Reason for Consult  discharge planning    Preferred Language  English     Used During This Interaction  no                      Patient Forms     Row Name 02/07/20 1314       Patient Forms    Important Message from Medicare (IMM)  Delivered    Delivered to  Support person    Method of delivery  In person            Tejal Justin RN

## 2020-02-08 PROBLEM — J20.8 ACUTE BRONCHITIS DUE TO HUMAN METAPNEUMOVIRUS: Status: ACTIVE | Noted: 2020-02-08

## 2020-02-08 PROBLEM — B97.81 ACUTE BRONCHITIS DUE TO HUMAN METAPNEUMOVIRUS: Status: ACTIVE | Noted: 2020-02-08

## 2020-02-08 LAB
ANION GAP SERPL CALCULATED.3IONS-SCNC: 12 MMOL/L (ref 5–15)
BUN BLD-MCNC: 33 MG/DL (ref 8–23)
BUN/CREAT SERPL: 31.7 (ref 7–25)
BURR CELLS BLD QL SMEAR: ABNORMAL
CALCIUM SPEC-SCNC: 8.8 MG/DL (ref 8.6–10.5)
CHLORIDE SERPL-SCNC: 108 MMOL/L (ref 98–107)
CO2 SERPL-SCNC: 20 MMOL/L (ref 22–29)
CREAT BLD-MCNC: 1.04 MG/DL (ref 0.57–1)
DEPRECATED RDW RBC AUTO: 45.5 FL (ref 37–54)
ERYTHROCYTE [DISTWIDTH] IN BLOOD BY AUTOMATED COUNT: 15.6 % (ref 12.3–15.4)
GFR SERPL CREATININE-BSD FRML MDRD: 51 ML/MIN/1.73
GLUCOSE BLD-MCNC: 151 MG/DL (ref 65–99)
GLUCOSE BLDC GLUCOMTR-MCNC: 118 MG/DL (ref 70–105)
GLUCOSE BLDC GLUCOMTR-MCNC: 128 MG/DL (ref 70–105)
GLUCOSE BLDC GLUCOMTR-MCNC: 141 MG/DL (ref 70–105)
GLUCOSE BLDC GLUCOMTR-MCNC: 172 MG/DL (ref 70–105)
HCT VFR BLD AUTO: 31 % (ref 34–46.6)
HGB BLD-MCNC: 10 G/DL (ref 12–15.9)
LARGE PLATELETS: ABNORMAL
LYMPHOCYTES # BLD MANUAL: 5.74 10*3/MM3 (ref 0.7–3.1)
LYMPHOCYTES NFR BLD MANUAL: 41 % (ref 19.6–45.3)
LYMPHOCYTES NFR BLD MANUAL: 5 % (ref 5–12)
MCH RBC QN AUTO: 26.6 PG (ref 26.6–33)
MCHC RBC AUTO-ENTMCNC: 32.1 G/DL (ref 31.5–35.7)
MCV RBC AUTO: 82.8 FL (ref 79–97)
MONOCYTES # BLD AUTO: 0.7 10*3/MM3 (ref 0.1–0.9)
NEUTROPHILS # BLD AUTO: 6.02 10*3/MM3 (ref 1.7–7)
NEUTROPHILS NFR BLD MANUAL: 41 % (ref 42.7–76)
NEUTS BAND NFR BLD MANUAL: 2 % (ref 0–5)
PLATELET # BLD AUTO: 147 10*3/MM3 (ref 140–450)
PMV BLD AUTO: 9.9 FL (ref 6–12)
POIKILOCYTOSIS BLD QL SMEAR: ABNORMAL
POTASSIUM BLD-SCNC: 4.1 MMOL/L (ref 3.5–5.2)
RBC # BLD AUTO: 3.75 10*6/MM3 (ref 3.77–5.28)
SCAN SLIDE: NORMAL
SMALL PLATELETS BLD QL SMEAR: ABNORMAL
SODIUM BLD-SCNC: 140 MMOL/L (ref 136–145)
VARIANT LYMPHS NFR BLD MANUAL: 11 % (ref 0–5)
WBC MORPH BLD: NORMAL
WBC NRBC COR # BLD: 14 10*3/MM3 (ref 3.4–10.8)

## 2020-02-08 PROCEDURE — 25010000002 HYDRALAZINE PER 20 MG: Performed by: NURSE PRACTITIONER

## 2020-02-08 PROCEDURE — 25010000002 HEPARIN (PORCINE) PER 1000 UNITS: Performed by: NURSE PRACTITIONER

## 2020-02-08 PROCEDURE — 94799 UNLISTED PULMONARY SVC/PX: CPT

## 2020-02-08 PROCEDURE — 80048 BASIC METABOLIC PNL TOTAL CA: CPT | Performed by: HOSPITALIST

## 2020-02-08 PROCEDURE — 25010000002 METHYLPREDNISOLONE PER 40 MG: Performed by: NURSE PRACTITIONER

## 2020-02-08 PROCEDURE — 25010000002 ONDANSETRON PER 1 MG: Performed by: NURSE PRACTITIONER

## 2020-02-08 PROCEDURE — 82962 GLUCOSE BLOOD TEST: CPT

## 2020-02-08 PROCEDURE — 85007 BL SMEAR W/DIFF WBC COUNT: CPT | Performed by: HOSPITALIST

## 2020-02-08 PROCEDURE — 63710000001 INSULIN LISPRO (HUMAN) PER 5 UNITS: Performed by: HOSPITALIST

## 2020-02-08 PROCEDURE — 85025 COMPLETE CBC W/AUTO DIFF WBC: CPT | Performed by: HOSPITALIST

## 2020-02-08 PROCEDURE — 99232 SBSQ HOSP IP/OBS MODERATE 35: CPT | Performed by: HOSPITALIST

## 2020-02-08 RX ORDER — GUAIFENESIN AND DEXTROMETHORPHAN HYDROBROMIDE 600; 30 MG/1; MG/1
2 TABLET, EXTENDED RELEASE ORAL EVERY 12 HOURS SCHEDULED
Status: DISCONTINUED | OUTPATIENT
Start: 2020-02-08 | End: 2020-02-12 | Stop reason: HOSPADM

## 2020-02-08 RX ADMIN — SUCRALFATE 1 G: 1 TABLET ORAL at 21:44

## 2020-02-08 RX ADMIN — ROSUVASTATIN CALCIUM 10 MG: 10 TABLET, FILM COATED ORAL at 21:44

## 2020-02-08 RX ADMIN — SUCRALFATE 1 G: 1 TABLET ORAL at 16:53

## 2020-02-08 RX ADMIN — AZITHROMYCIN MONOHYDRATE 250 MG: 250 TABLET ORAL at 07:55

## 2020-02-08 RX ADMIN — SERTRALINE 100 MG: 100 TABLET, FILM COATED ORAL at 21:44

## 2020-02-08 RX ADMIN — INSULIN LISPRO 4 UNITS: 100 INJECTION, SOLUTION INTRAVENOUS; SUBCUTANEOUS at 17:04

## 2020-02-08 RX ADMIN — AMLODIPINE BESYLATE 10 MG: 5 TABLET ORAL at 12:20

## 2020-02-08 RX ADMIN — HYDRALAZINE HYDROCHLORIDE 10 MG: 20 INJECTION INTRAMUSCULAR; INTRAVENOUS at 17:03

## 2020-02-08 RX ADMIN — IPRATROPIUM BROMIDE AND ALBUTEROL SULFATE 3 ML: .5; 3 SOLUTION RESPIRATORY (INHALATION) at 11:14

## 2020-02-08 RX ADMIN — GUAIFENESIN AND DEXTROMETHORPHAN HYDROBROMIDE 2 TABLET: 600; 30 TABLET, EXTENDED RELEASE ORAL at 12:20

## 2020-02-08 RX ADMIN — ACETAMINOPHEN 650 MG: 325 TABLET, FILM COATED ORAL at 10:07

## 2020-02-08 RX ADMIN — METOCLOPRAMIDE 7.5 MG: 5 TABLET ORAL at 06:39

## 2020-02-08 RX ADMIN — IPRATROPIUM BROMIDE AND ALBUTEROL SULFATE 3 ML: .5; 3 SOLUTION RESPIRATORY (INHALATION) at 18:17

## 2020-02-08 RX ADMIN — BENZONATATE 200 MG: 100 CAPSULE ORAL at 06:40

## 2020-02-08 RX ADMIN — HEPARIN SODIUM 5000 UNITS: 5000 INJECTION INTRAVENOUS; SUBCUTANEOUS at 21:44

## 2020-02-08 RX ADMIN — HYDROCODONE POLISTIREX AND CHLORPHENIRAMINE POLISTIREX 5 ML: 10; 8 SUSPENSION, EXTENDED RELEASE ORAL at 12:20

## 2020-02-08 RX ADMIN — METHYLPREDNISOLONE SODIUM SUCCINATE 40 MG: 40 INJECTION, POWDER, FOR SOLUTION INTRAMUSCULAR; INTRAVENOUS at 12:20

## 2020-02-08 RX ADMIN — GUAIFENESIN AND DEXTROMETHORPHAN HYDROBROMIDE 2 TABLET: 600; 30 TABLET, EXTENDED RELEASE ORAL at 21:44

## 2020-02-08 RX ADMIN — ACETAMINOPHEN 650 MG: 325 TABLET, FILM COATED ORAL at 16:53

## 2020-02-08 RX ADMIN — METHYLPREDNISOLONE SODIUM SUCCINATE 40 MG: 40 INJECTION, POWDER, FOR SOLUTION INTRAMUSCULAR; INTRAVENOUS at 21:45

## 2020-02-08 RX ADMIN — POLYETHYLENE GLYCOL 3350 17 G: 17 POWDER, FOR SOLUTION ORAL at 07:55

## 2020-02-08 RX ADMIN — ONDANSETRON 4 MG: 2 INJECTION INTRAMUSCULAR; INTRAVENOUS at 20:51

## 2020-02-08 RX ADMIN — SUCRALFATE 1 G: 1 TABLET ORAL at 07:54

## 2020-02-08 RX ADMIN — IPRATROPIUM BROMIDE AND ALBUTEROL SULFATE 3 ML: .5; 3 SOLUTION RESPIRATORY (INHALATION) at 03:17

## 2020-02-08 RX ADMIN — ONDANSETRON 4 MG: 2 INJECTION INTRAMUSCULAR; INTRAVENOUS at 14:23

## 2020-02-08 RX ADMIN — OXYBUTYNIN CHLORIDE 10 MG: 10 TABLET, EXTENDED RELEASE ORAL at 07:54

## 2020-02-08 RX ADMIN — SUCRALFATE 1 G: 1 TABLET ORAL at 12:20

## 2020-02-08 RX ADMIN — DOCUSATE SODIUM 100 MG: 100 CAPSULE, LIQUID FILLED ORAL at 07:55

## 2020-02-08 RX ADMIN — Medication 10 ML: at 07:57

## 2020-02-08 RX ADMIN — Medication 10 ML: at 21:45

## 2020-02-08 RX ADMIN — BENZONATATE 200 MG: 100 CAPSULE ORAL at 16:52

## 2020-02-08 RX ADMIN — HEPARIN SODIUM 5000 UNITS: 5000 INJECTION INTRAVENOUS; SUBCUTANEOUS at 07:57

## 2020-02-08 RX ADMIN — LEVOTHYROXINE SODIUM 100 MCG: 100 TABLET ORAL at 07:54

## 2020-02-08 RX ADMIN — SODIUM CHLORIDE 75 ML/HR: 900 INJECTION, SOLUTION INTRAVENOUS at 12:20

## 2020-02-08 RX ADMIN — PANTOPRAZOLE SODIUM 40 MG: 40 TABLET, DELAYED RELEASE ORAL at 08:00

## 2020-02-08 RX ADMIN — GUAIFENESIN 1200 MG: 600 TABLET, EXTENDED RELEASE ORAL at 07:55

## 2020-02-08 RX ADMIN — ANASTROZOLE 1 MG: 1 TABLET ORAL at 08:00

## 2020-02-08 RX ADMIN — CETIRIZINE HYDROCHLORIDE 5 MG: 10 TABLET, FILM COATED ORAL at 07:55

## 2020-02-08 RX ADMIN — IPRATROPIUM BROMIDE AND ALBUTEROL SULFATE 3 ML: .5; 3 SOLUTION RESPIRATORY (INHALATION) at 07:11

## 2020-02-08 RX ADMIN — DOCUSATE SODIUM 100 MG: 100 CAPSULE, LIQUID FILLED ORAL at 21:44

## 2020-02-08 RX ADMIN — CYANOCOBALAMIN TAB 1000 MCG 1000 MCG: 1000 TAB at 07:55

## 2020-02-08 RX ADMIN — METHYLPREDNISOLONE SODIUM SUCCINATE 40 MG: 40 INJECTION, POWDER, FOR SOLUTION INTRAMUSCULAR; INTRAVENOUS at 04:43

## 2020-02-08 RX ADMIN — FERROUS SULFATE TAB EC 324 MG (65 MG FE EQUIVALENT) 324 MG: 324 (65 FE) TABLET DELAYED RESPONSE at 07:55

## 2020-02-08 RX ADMIN — MIRTAZAPINE 15 MG: 15 TABLET, FILM COATED ORAL at 21:44

## 2020-02-08 RX ADMIN — IPRATROPIUM BROMIDE AND ALBUTEROL SULFATE 3 ML: .5; 3 SOLUTION RESPIRATORY (INHALATION) at 15:08

## 2020-02-08 NOTE — DISCHARGE PLACEMENT REQUEST
"Diane Apple (79 y.o. Female)     Date of Birth Social Security Number Address Home Phone MRN    1940  [address] [address] [phone] [id number]    Anabaptist Marital Status          Non-Jewish        Admission Date Admission Type Admitting Provider Attending Provider Department, Room/Bed    2/6/20 Emergency Ysabel Rubi MD Hall, Kelli G, MD [address] PEDIATRICS, 203/1    Discharge Date Discharge Disposition Discharge Destination                       Attending Provider:  Ysabel Rubi MD    Allergies:  No Known Allergies    Isolation:  Contact Drop   Infection:  Human Metapneumovirus  (02/06/20)   Code Status:  CPR    Ht:  160 cm (63\")   Wt:  80.1 kg (176 lb 9.4 oz)    Admission Cmt:  None   Principal Problem:  Acute bronchitis due to human metapneumovirus [J20.8]                 Active Insurance as of 2/6/2020     Primary Coverage     Payor Plan Insurance Group Employer/Plan Group    ANTH MEDICARE REPLACEMENT ANTH MEDICARE ADVANTAGE [id number]     Payor Plan Address Payor Plan Phone Number Payor Plan Fax Number Effective Dates    [address] [phone]  1/1/2019 - None Entered    [facility] [id number]       Subscriber Name Subscriber Birth Date Member ID       DIANE APPLE 1940 [id number]                 Emergency Contacts      (Rel.) Home Phone Work Phone Mobile Phone    CHERYL SLAUGHTER (Daughter) [phone] -- --              "

## 2020-02-08 NOTE — PLAN OF CARE
Problem: Patient Care Overview  Goal: Plan of Care Review  Outcome: Ongoing (interventions implemented as appropriate)  Flowsheets (Taken 2/8/2020 3945)  Plan of Care Reviewed With: patient;daughter  Note:   Pt complaining of headache and increased productive cough with clear sputum. Medication given. No complaints at this time.   Goal: Individualization and Mutuality  Outcome: Ongoing (interventions implemented as appropriate)  Goal: Discharge Needs Assessment  Outcome: Ongoing (interventions implemented as appropriate)  Goal: Interprofessional Rounds/Family Conf  Outcome: Ongoing (interventions implemented as appropriate)  Flowsheets (Taken 2/8/2020 1044)  Participants:   Note:   Discussed need for new nebulizer machine due to pt stating she did not have one for her PRN Albuterol treatments at home.      Problem: Fall Risk (Adult)  Goal: Identify Related Risk Factors and Signs and Symptoms  Outcome: Ongoing (interventions implemented as appropriate)  Flowsheets (Taken 2/7/2020 0351 by Jin Mcgrath LPN)  Related Risk Factors (Fall Risk): age-related changes;inadequate lighting;environment unfamiliar  Note:   Discussed need to use call light when getting out of bed. Pt alert and oriented x4 and verbalized understanding. Educated about bed alarm, yellow gown, yellow bracelet. Pt's daughter at bedside and verbalized understanding as well.   Goal: Absence of Fall  Outcome: Ongoing (interventions implemented as appropriate)  Flowsheets (Taken 2/8/2020 0537 by Gerry Montes De Oca LPN)  Absence of Fall: making progress toward outcome  Note:   No falls this shift.      Problem: Breathing Pattern Ineffective (Adult)  Goal: Identify Related Risk Factors and Signs and Symptoms  Outcome: Ongoing (interventions implemented as appropriate)  Flowsheets  Taken 2/7/2020 0351 by Jin Mcgrath LPN  Related Risk Factors (Breathing Pattern Ineffective): advanced age;infection;underlying condition  Taken  2/8/2020 1044 by Nava Baez, RN  Signs and Symptoms (Breathing Pattern Ineffective): activity intolerance;breath sounds abnormal  Note:   Pt educated on use of breathing exercises, oxygen, and activity intolerance. Pt wears 2 L O2 at  home with no complications. Pt does not appear in distress.   Goal: Effective Oxygenation/Ventilation  Outcome: Ongoing (interventions implemented as appropriate)  Flowsheets (Taken 2/7/2020 0351 by Jin Mcgrath LPN)  Effective Oxygenation/Ventilation: making progress toward outcome  Goal: Anxiety/Fear Reduction  Outcome: Ongoing (interventions implemented as appropriate)  Flowsheets (Taken 2/7/2020 0351 by Jin Mcgrath LPN)  Anxiety/Fear Reduction: making progress toward outcome     Problem: Skin Injury Risk (Adult)  Goal: Identify Related Risk Factors and Signs and Symptoms  Outcome: Ongoing (interventions implemented as appropriate)  Flowsheets (Taken 2/8/2020 0537 by Gerry Montes De Oca LPN)  Related Risk Factors (Skin Injury Risk): advanced age;hospitalization prolonged;critical care admission;mobility impaired  Note:   Educated pt about need to move at least every 2 hours. Pt able to move in bed on own with daughter reminding pt. Pt up in chair at times but limited to sitting up in chair for one hour.   Goal: Skin Health and Integrity  Outcome: Ongoing (interventions implemented as appropriate)  Flowsheets (Taken 2/8/2020 1044)  Skin Health and Integrity: making progress toward outcome  Note:   No skin injuries or new skin abrasions.

## 2020-02-08 NOTE — PLAN OF CARE
Problem: Patient Care Overview  Goal: Plan of Care Review  Outcome: Ongoing (interventions implemented as appropriate)  Flowsheets  Taken 2/7/2020 1551 by Cecelia Linder PT  Progress: improving  Taken 2/7/2020 1920 by Grery Montes De Oca LPN  Plan of Care Reviewed With: patient;daughter  Taken 2/8/2020 0537 by Gerry Montes De Oca LPN  Outcome Summary: Pt c/o some nausea. Compazine given with good effect

## 2020-02-08 NOTE — PROGRESS NOTES
[facility] Medicine Services Daily Progress Note      Hospitalist Team  LOS 1 days      Patient Care Team:  Jemima Fontaine MD as PCP - General (Family Medicine)    Patient Location: 203/1      Subjective   Subjective     Chief Complaint / Subjective  Chief Complaint   Patient presents with   • Cough         Brief Synopsis of Hospital Course/HPI  Patient is a 79-year-old female with a history of breast cancer, CLL, HTN, HLD, type II DM, GERD, depression and is undergoing current treatment for MAC pneumonia who presented to the emergency room complaining of cough and headache that began approximately 1 week ago.  Outpatient treatment with antibiotics and steroids did not help and she came to the emergency room where she was diagnosed with human metapneumovirus infection.  She has had some nausea and vomiting with no hematemesis or coffee-ground emesis.  She complains of acute on chronic constipation as well, and she reports that she normally takes Colace and polyethylene glycol at home. She uses nasal cannula O2 at night at home normally.    Date: 2/7/2020: Patient reports persistent chest congestion and cough with wheezing.  She reports that she vomited after she ate a turkey sandwich last night but she was able to keep down an omelette with schreiber this morning.  2/8/2020: The patient reports continued chest congestion and cough.  She has had recurrent episodes of vomiting of the food that she is eaten soon after she eats.      ROS  12 point review of systems was reviewed and was negative except as above.    Objective   Objective      Vital Signs  Temp:  [97.9 °F (36.6 °C)-98 °F (36.7 °C)] 97.9 °F (36.6 °C)  Heart Rate:  [] 73  Resp:  [16-24] 20  BP: (122-164)/(43-68) 133/61  Oxygen Therapy  SpO2: 95 %  Pulse Oximetry Type: Intermittent  Device (Oxygen Therapy): nasal cannula  Flow (L/min): 2  Oxygen Concentration (%): 28  Flowsheet Rows      First Filed Value   Admission Height   "160 cm (63\") Documented at 02/06/2020 1248   Admission Weight  81.5 kg (179 lb 10.8 oz) Documented at 02/06/2020 1248        Intake & Output (last 3 days)       02/05 0701 - 02/06 0700 02/06 0701 - 02/07 0700 02/07 0701 - 02/08 0700 02/08 0701 - 02/09 0700    P.O.   1420     Total Intake(mL/kg)   1420 (17.7)     Urine (mL/kg/hr)   900 (0.5) 300 (1.2)    Total Output   900 300    Net   +520 -300            Urine Unmeasured Occurrence  1 x 2 x         Lines, Drains & Airways    Active LDAs     Name:   Placement date:   Placement time:   Site:   Days:    Peripheral IV 02/06/20 1403 Left Forearm   02/06/20    1403    Forearm   less than 1                  Physical Exam:    Physical Exam    Well-developed over-nourished female in no acute distress sitting up in bed double on nasal cannula O2 awake and alert; mucous membranes moist; sclerae anicteric; lungs with diffuse coarse rhonchi and expiratory wheezes bilaterally; CV regular rate and rhythm; abdomen soft nontender nondistended with active bowel sounds; extremities with no edema, cyanosis or calf tenderness; palpable pedal pulses bilaterally; no Bellamy catheter.      Procedures:              Results Review:     I reviewed the patient's new clinical results.      Lab Results (last 24 hours)     Procedure Component Value Units Date/Time    POC Glucose Once [[id number]]  (Abnormal) Collected:  02/08/20 0706    Specimen:  Blood Updated:  02/08/20 0709     Glucose 128 mg/dL      Comment: Serial Number: [id number]:  [id number]       CBC & Differential [[id number]] Collected:  02/08/20 0238    Specimen:  Blood Updated:  02/08/20 0346    Narrative:       The following orders were created for panel order CBC & Differential.  Procedure                               Abnormality         Status                     ---------                               -----------         ------                     CBC Auto Differential[[id number]]        Abnormal            Final result       "           Please view results for these tests on the individual orders.    CBC Auto Differential [[id number]]  (Abnormal) Collected:  02/08/20 0238    Specimen:  Blood Updated:  02/08/20 0346     WBC 14.00 10*3/mm3      RBC 3.75 10*6/mm3      Hemoglobin 10.0 g/dL      Hematocrit 31.0 %      MCV 82.8 fL      MCH 26.6 pg      MCHC 32.1 g/dL      RDW 15.6 %      RDW-SD 45.5 fl      MPV 9.9 fL      Platelets 147 10*3/mm3     Scan Slide [[id number]] Collected:  02/08/20 0238    Specimen:  Blood Updated:  02/08/20 0346     Scan Slide --     Comment: See Manual Differential Results       Manual Differential [[id number]]  (Abnormal) Collected:  02/08/20 0238    Specimen:  Blood Updated:  02/08/20 0346     Neutrophil % 41.0 %      Lymphocyte % 41.0 %      Monocyte % 5.0 %      Bands %  2.0 %      Atypical Lymphocyte % 11.0 %      Neutrophils Absolute 6.02 10*3/mm3      Lymphocytes Absolute 5.74 10*3/mm3      Monocytes Absolute 0.70 10*3/mm3      Stella Cells Mod/2+     Poikilocytes Slight/1+     WBC Morphology Normal     Platelet Estimate Decreased     Large Platelets Slight/1+    Basic Metabolic Panel [[id number]]  (Abnormal) Collected:  02/08/20 0238    Specimen:  Blood Updated:  02/08/20 0344     Glucose 151 mg/dL      BUN 33 mg/dL      Creatinine 1.04 mg/dL      Sodium 140 mmol/L      Potassium 4.1 mmol/L      Chloride 108 mmol/L      CO2 20.0 mmol/L      Calcium 8.8 mg/dL      eGFR Non African Amer 51 mL/min/1.73      BUN/Creatinine Ratio 31.7     Anion Gap 12.0 mmol/L     Narrative:       GFR Normal >60  Chronic Kidney Disease <60  Kidney Failure <15      POC Glucose Once [[id number]]  (Abnormal) Collected:  02/07/20 1931    Specimen:  Blood Updated:  02/07/20 1933     Glucose 208 mg/dL      Comment: Serial Number: [id number]:  [id number]       POC Glucose Once [[id number]]  (Abnormal) Collected:  02/07/20 1634    Specimen:  Blood Updated:  02/07/20 1635     Glucose 192 mg/dL      Comment: Serial Number:  [id number]:  [id number]       Pathology Consultation [[id number]] Collected:  02/06/20 1405    Specimen:  Blood, Venous Line Updated:  02/07/20 1526     Final Diagnosis --     Absolute atypical lymphocytosis.  Anemia.  No blasts identified.  Recommend clinical follow-up and additional studies to include flow cytometry as clinically indicated to exclude CLL/SLL or other lymphoproliferative disorder.       Case Report --     Surgical Pathology Report                         Case: [id number]                                  Authorizing Provider:  Catia Arrieta APRN     Collected:           02/06/2020 02:05 PM          Ordering Location:     [facility]       Received:            02/07/2020 09:36 AM                                 EMERGENCY DEPARTMENT                                                         Pathologist:           Frederick Ross MD                                                           Specimen:    Blood, Venous Line                                                                         Blood Culture - Blood, Arm, Left [[id number]] Collected:  02/06/20 1434    Specimen:  Blood from Arm, Left Updated:  02/07/20 1445     Blood Culture No growth at 24 hours    Blood Culture - Blood, Arm, Left [[id number]] Collected:  02/06/20 1405    Specimen:  Blood from Arm, Left Updated:  02/07/20 1415     Blood Culture No growth at 24 hours    POC Glucose Once [[id number]]  (Abnormal) Collected:  02/07/20 1137    Specimen:  Blood Updated:  02/07/20 1143     Glucose 159 mg/dL      Comment: Serial Number: [id number]:  [id number]       Hemoglobin A1c [[id number]]  (Abnormal) Collected:  02/06/20 1405    Specimen:  Blood Updated:  02/07/20 1108     Hemoglobin A1C 5.7 %     Narrative:       Hemoglobin A1C Reference Range:    <5.7 %        Normal  5.7-6.4 %     Increased risk for diabetes  > 6.4 %        Diabetes       These guidelines have been recommended by the American Diabetic Association for Hgb  A1c.      The following 2010 guidelines have been recommended by the American Diabetes Association for Hemoglobin A1c.    HBA1c 5.7-6.4% Increased risk for future diabetes (pre-diabetes)  HBA1c     >6.4% Diabetes          Hemoglobin A1C   Date Value Ref Range Status   02/06/2020 5.7 (H) 3.5 - 5.6 % Final               No results found for: LIPASE  Lab Results   Component Value Date    CHOL 374 (H) 01/03/2020    TRIG 311 (H) 01/03/2020    HDL 50 01/03/2020     (H) 01/03/2020       Lab Results   Lab Value Date/Time    FINALDX  02/06/2020 1405     Absolute atypical lymphocytosis.  Anemia.  No blasts identified.  Recommend clinical follow-up and additional studies to include flow cytometry as clinically indicated to exclude CLL/SLL or other lymphoproliferative disorder.      FINALDX  06/25/2019 1136     Absolute atypical lymphocytosis suspicious for CLL/SLL.         Microbiology Results (last 10 days)     Procedure Component Value - Date/Time    Blood Culture - Blood, Arm, Left [[id number]] Collected:  02/06/20 1434    Lab Status:  Preliminary result Specimen:  Blood from Arm, Left Updated:  02/07/20 1445     Blood Culture No growth at 24 hours    Respiratory Panel, PCR - Swab, Nasopharynx [[id number]]  (Abnormal) Collected:  02/06/20 1412    Lab Status:  Final result Specimen:  Swab from Nasopharynx Updated:  02/06/20 1611     ADENOVIRUS, PCR Not Detected     Coronavirus 229E Not Detected     Coronavirus HKU1 Not Detected     Coronavirus NL63 Not Detected     Coronavirus OC43 Not Detected     Human Metapneumovirus Detected     Human Rhinovirus/Enterovirus Not Detected     Influenza B PCR Not Detected     Parainfluenza Virus 1 Not Detected     Parainfluenza Virus 2 Not Detected     Parainfluenza Virus 3 Not Detected     Parainfluenza Virus 4 Not Detected     Bordetella pertussis pcr Not Detected     Influenza A H1 2009 PCR Not Detected     Chlamydophila pneumoniae PCR Not Detected     Mycoplasma pneumo by PCR Not  Detected     Influenza A PCR Not Detected     Influenza A H3 Not Detected     Influenza A H1 Not Detected     RSV, PCR Not Detected    Blood Culture - Blood, Arm, Left [[id number]] Collected:  02/06/20 1405    Lab Status:  Preliminary result Specimen:  Blood from Arm, Left Updated:  02/07/20 1415     Blood Culture No growth at 24 hours          ECG/EMG Results (most recent)     None                    Xr Chest 1 View    Result Date: 2/6/2020  There is no significant change when compared to the prior study. There is no evidence for acute cardiopulmonary process.  Electronically Signed By-Frederick Lucas On:2/6/2020 3:54 PM This report was finalized on [id number] by  Frederick Lucas, .    Xr Chest Pa & Lateral    Result Date: 2/4/2020  1. No acute cardiopulmonary abnormality. 2. Small hiatal hernia.  Electronically Signed By-Antony Lopez On:2/4/2020 9:40 PM This report was finalized on [id number] by  Antony Lopez, .          Xrays, labs reviewed personally by physician.    Medication Review:   I have reviewed the patient's current medication list      Scheduled Meds    amLODIPine 10 mg Oral Daily With Lunch   anastrozole 1 mg Oral Daily   azithromycin 250 mg Oral Once per day on Tue Thu Sat   cetirizine 5 mg Oral Daily   docusate sodium 100 mg Oral BID   ethambutol 400 mg Oral Once per day on Mon Wed Fri   ferrous sulfate 324 mg Oral Daily   guaiFENesin 1,200 mg Oral Q12H   heparin (porcine) 5,000 Units Subcutaneous Q12H   insulin lispro 0-9 Units Subcutaneous 4x Daily With Meals & Nightly   ipratropium-albuterol 3 mL Nebulization Q4H - RT   levothyroxine 100 mcg Oral QAM AC   methylPREDNISolone sodium succinate 40 mg Intravenous Q8H   metoclopramide 7.5 mg Oral QAM   mirtazapine 15 mg Oral Nightly   oxybutynin XL 10 mg Oral Daily   pantoprazole 40 mg Oral QAM AC   polyethylene glycol 17 g Oral BID   rifAMPin 300 mg Oral Once per day on Mon Wed Fri   rosuvastatin 10 mg Oral Nightly   sertraline 100 mg Oral  Nightly   sodium chloride 10 mL Intravenous Q12H   sucralfate 1 g Oral 4x Daily AC & at Bedtime   vitamin B-12 1,000 mcg Oral Daily       Meds Infusions    sodium chloride 75 mL/hr Last Rate: 75 mL/hr (02/08/20 0201)       Meds PRN  •  acetaminophen **OR** acetaminophen **OR** acetaminophen  •  benzonatate  •  dextrose  •  dextrose  •  diphenhydrAMINE  •  glucagon (human recombinant)  •  hydrALAZINE  •  influenza vaccine  •  insulin lispro **AND** insulin lispro  •  ondansetron **OR** ondansetron  •  sodium chloride        Assessment/Plan   Assessment/Plan     Active Hospital Problems    Diagnosis  POA   • **Acute bronchitis due to human metapneumovirus [J20.8]  Yes     Priority: High   • Constipation [K59.00]  Yes   • Overactive bladder [N32.81]  Yes   • Vitamin B 12 deficiency [E53.8]  Yes   • DM hyperosmolarity type II (CMS/HCC) [E11.00]  Yes   • Seasonal allergies [J30.2]  Yes   • Depression [F32.9]  Yes   • Essential hypertension [I10]  Yes   • Hyperlipidemia [E78.5]  Yes   • Mycobacterium avium complex (CMS/HCC) [A31.0]  Yes   • Breast cancer (CMS/HCC) [C50.919]  Yes   • Chronic lymphoid leukemia (CMS/HCC) [C91.10]  Yes   • Hypothyroidism [E03.9]  Yes      Resolved Hospital Problems   No resolved problems to display.       MEDICAL DECISION MAKING COMPLEXITY BY PROBLEM:      Acute bronchitis secondary to human metapneumovirus upper respiratory infection associated high-grade fever, nausea and vomiting.  She is somewhat improved now with antiemetics and IV fluid hydration.  -Increase frequency of nebulizer treatments to every 4 hours due to persistent wheezing.    Leukocytosis secondary to above   -Monitor    Nausea and vomiting likely secondary to cough  -Antiemetics and supportive care    Acute kidney injury  -  CR 1.2, monitor  - Baseline CR 0.9   - Avoid nephrotoxic medications  - Gentle IV hydration ordered      Acute on chronic headache  -Patient states she gets headaches a lot at home and currently has a  headache in which she believes is due to her coughing  -Resolved with IV Compazine and IV Benadryl     Essential hypertension , chronic  - Uncontrolled blood pressure 169/79   - Monitor blood pressure   -Continue amlodipine, holding home losartan due to LEXUS  -Continue IV hydralazine PRN      MAC  -Managed by Dr. Frankel outpatient-patient recently told she just needs to complete the rest of her medications that she has at home and she will be completed with her MAC treatment  -Patient stated she recently did a sleep study   -Patient wears 2 L nasal cannula at night  -Continue azithromycin, ethambutol, rifampin      Diabetes mellitus type 2  - Accuchecks ACHS  -Holding home metformin  -Hemoglobin A1C 5.9   -SSI     Hyperlipidemia  - Continue rosuvastatin      Hypothyroidism  -Continue levothyroxine     Breast cancer status post right mastectomy  -Managed by Dr. Palmer outpatient  -Continue Arimidex     Chronic lymphoid leukemia  -Managed by Dr. Palmer outpatient     Chronic constipation  -Continue polyethylene glycol, metoclopramide and Colace     GERD  -Continue pantoprazole and carafate     Seasonal allergies  - Continue zyrtec      Depression, chronic  - Continue remeron and zoloft      Overactive bladder  - Continue detrol      Vitamin B 12 deficiency  - Continue vitamin B 12        VTE Prophylaxis - Heparin        Mechanical Order History:     None      Pharmalogical Order History:     Ordered     Dose Route Frequency Stop    02/06/20 1938  heparin (porcine) 5000 UNIT/ML injection 5,000 Units      5,000 Units SC Every 12 Hours Scheduled --            Code Status -   Code Status and Medical Interventions:   Ordered at: 02/06/20 1825     Level Of Support Discussed With:    Patient     Code Status:    CPR     Medical Interventions (Level of Support Prior to Arrest):    Full       Discharge Planning    Home in 1 to 2 days if clinically improved.  SLP OP Goals     Row Name 02/07/20 151          Time Calculation    PT  Goal Re-Cert Due Date  02/21/20  -HD       User Key  (r) = Recorded By, (t) = Taken By, (c) = Cosigned By    Initials Name Provider Type    HD Cecelia Linder, PT Physical Therapist          Destination      Coordination has not been started for this encounter.      Durable Medical Equipment      Coordination has not been started for this encounter.      Dialysis/Infusion      Coordination has not been started for this encounter.      Home Medical Care      Coordination has not been started for this encounter.      Therapy      Coordination has not been started for this encounter.      Community Resources      Coordination has not been started for this encounter.            Electronically signed by Ysabel Rubi MD, 02/08/20, 10:04 AM.  [facility]ist Team

## 2020-02-08 NOTE — PROGRESS NOTES
Continued Stay Note  [facility]     Patient Name: Diane Guan  MRN: [id number]  Today's Date: 2/8/2020    Admit Date: 2/6/2020    Discharge Plan     Row Name 02/08/20 1112       Plan    Plan  Return home with spouse, has O2 @[facility] , NEW NEBULIZER to room                     Tatiana Kat RN

## 2020-02-09 LAB
ABO GROUP BLD: NORMAL
ALBUMIN SERPL-MCNC: 3.6 G/DL (ref 3.5–5.2)
ALBUMIN/GLOB SERPL: 1.7 G/DL
ALP SERPL-CCNC: 89 U/L (ref 39–117)
ALT SERPL W P-5'-P-CCNC: 10 U/L (ref 1–33)
ANION GAP SERPL CALCULATED.3IONS-SCNC: 10 MMOL/L (ref 5–15)
APTT PPP: 22.1 SECONDS (ref 61–76.5)
AST SERPL-CCNC: 13 U/L (ref 1–32)
BILIRUB SERPL-MCNC: <0.2 MG/DL (ref 0.2–1.2)
BLD GP AB SCN SERPL QL: NEGATIVE
BUN BLD-MCNC: 26 MG/DL (ref 8–23)
BUN/CREAT SERPL: 26.5 (ref 7–25)
CALCIUM SPEC-SCNC: 8.9 MG/DL (ref 8.6–10.5)
CHLORIDE SERPL-SCNC: 112 MMOL/L (ref 98–107)
CO2 SERPL-SCNC: 20 MMOL/L (ref 22–29)
CREAT BLD-MCNC: 0.98 MG/DL (ref 0.57–1)
DEPRECATED RDW RBC AUTO: 47.7 FL (ref 37–54)
ERYTHROCYTE [DISTWIDTH] IN BLOOD BY AUTOMATED COUNT: 15.9 % (ref 12.3–15.4)
GFR SERPL CREATININE-BSD FRML MDRD: 55 ML/MIN/1.73
GLOBULIN UR ELPH-MCNC: 2.1 GM/DL
GLUCOSE BLD-MCNC: 138 MG/DL (ref 65–99)
GLUCOSE BLDC GLUCOMTR-MCNC: 116 MG/DL (ref 70–105)
GLUCOSE BLDC GLUCOMTR-MCNC: 135 MG/DL (ref 70–105)
GLUCOSE BLDC GLUCOMTR-MCNC: 139 MG/DL (ref 70–105)
GLUCOSE BLDC GLUCOMTR-MCNC: 168 MG/DL (ref 70–105)
HCT VFR BLD AUTO: 29.6 % (ref 34–46.6)
HGB BLD-MCNC: 9.4 G/DL (ref 12–15.9)
INR PPP: 0.93 (ref 0.9–1.1)
LYMPHOCYTES # BLD MANUAL: 14.01 10*3/MM3 (ref 0.7–3.1)
LYMPHOCYTES NFR BLD MANUAL: 1 % (ref 5–12)
LYMPHOCYTES NFR BLD MANUAL: 68 % (ref 19.6–45.3)
MAGNESIUM SERPL-MCNC: 1.7 MG/DL (ref 1.6–2.4)
MCH RBC QN AUTO: 26.9 PG (ref 26.6–33)
MCHC RBC AUTO-ENTMCNC: 31.7 G/DL (ref 31.5–35.7)
MCV RBC AUTO: 84.7 FL (ref 79–97)
MONOCYTES # BLD AUTO: 0.21 10*3/MM3 (ref 0.1–0.9)
NEUTROPHILS # BLD AUTO: 6.39 10*3/MM3 (ref 1.7–7)
NEUTROPHILS NFR BLD MANUAL: 31 % (ref 42.7–76)
PHOSPHATE SERPL-MCNC: 2.6 MG/DL (ref 2.5–4.5)
PLAT MORPH BLD: NORMAL
PLATELET # BLD AUTO: 177 10*3/MM3 (ref 140–450)
PMV BLD AUTO: 9.4 FL (ref 6–12)
POTASSIUM BLD-SCNC: 4.8 MMOL/L (ref 3.5–5.2)
PROT SERPL-MCNC: 5.7 G/DL (ref 6–8.5)
PROTHROMBIN TIME: 9.9 SECONDS (ref 9.6–11.7)
RBC # BLD AUTO: 3.5 10*6/MM3 (ref 3.77–5.28)
RBC MORPH BLD: NORMAL
RH BLD: POSITIVE
SCAN SLIDE: NORMAL
SODIUM BLD-SCNC: 142 MMOL/L (ref 136–145)
T&S EXPIRATION DATE: NORMAL
WBC MORPH BLD: NORMAL
WBC NRBC COR # BLD: 20.6 10*3/MM3 (ref 3.4–10.8)

## 2020-02-09 PROCEDURE — 99233 SBSQ HOSP IP/OBS HIGH 50: CPT | Performed by: HOSPITALIST

## 2020-02-09 PROCEDURE — 25010000002 HEPARIN (PORCINE) PER 1000 UNITS: Performed by: NURSE PRACTITIONER

## 2020-02-09 PROCEDURE — 83735 ASSAY OF MAGNESIUM: CPT | Performed by: HOSPITALIST

## 2020-02-09 PROCEDURE — 85007 BL SMEAR W/DIFF WBC COUNT: CPT | Performed by: HOSPITALIST

## 2020-02-09 PROCEDURE — 25010000002 METHYLPREDNISOLONE PER 40 MG: Performed by: NURSE PRACTITIONER

## 2020-02-09 PROCEDURE — 86900 BLOOD TYPING SEROLOGIC ABO: CPT

## 2020-02-09 PROCEDURE — 85730 THROMBOPLASTIN TIME PARTIAL: CPT | Performed by: HOSPITALIST

## 2020-02-09 PROCEDURE — 86900 BLOOD TYPING SEROLOGIC ABO: CPT | Performed by: HOSPITALIST

## 2020-02-09 PROCEDURE — 84100 ASSAY OF PHOSPHORUS: CPT | Performed by: HOSPITALIST

## 2020-02-09 PROCEDURE — 82962 GLUCOSE BLOOD TEST: CPT

## 2020-02-09 PROCEDURE — 86901 BLOOD TYPING SEROLOGIC RH(D): CPT

## 2020-02-09 PROCEDURE — 85610 PROTHROMBIN TIME: CPT | Performed by: HOSPITALIST

## 2020-02-09 PROCEDURE — 86901 BLOOD TYPING SEROLOGIC RH(D): CPT | Performed by: HOSPITALIST

## 2020-02-09 PROCEDURE — 63710000001 INSULIN LISPRO (HUMAN) PER 5 UNITS: Performed by: HOSPITALIST

## 2020-02-09 PROCEDURE — 85025 COMPLETE CBC W/AUTO DIFF WBC: CPT | Performed by: HOSPITALIST

## 2020-02-09 PROCEDURE — 80053 COMPREHEN METABOLIC PANEL: CPT | Performed by: HOSPITALIST

## 2020-02-09 PROCEDURE — 86850 RBC ANTIBODY SCREEN: CPT | Performed by: HOSPITALIST

## 2020-02-09 PROCEDURE — 94799 UNLISTED PULMONARY SVC/PX: CPT

## 2020-02-09 RX ORDER — PANTOPRAZOLE SODIUM 40 MG/1
40 TABLET, DELAYED RELEASE ORAL
Status: DISCONTINUED | OUTPATIENT
Start: 2020-02-09 | End: 2020-02-12 | Stop reason: HOSPADM

## 2020-02-09 RX ORDER — MAGNESIUM SULFATE 1 G/100ML
1 INJECTION INTRAVENOUS AS NEEDED
Status: DISCONTINUED | OUTPATIENT
Start: 2020-02-09 | End: 2020-02-12 | Stop reason: HOSPADM

## 2020-02-09 RX ORDER — POTASSIUM CHLORIDE 7.45 MG/ML
10 INJECTION INTRAVENOUS
Status: DISCONTINUED | OUTPATIENT
Start: 2020-02-09 | End: 2020-02-12 | Stop reason: HOSPADM

## 2020-02-09 RX ORDER — POTASSIUM CHLORIDE 1.5 G/1.77G
40 POWDER, FOR SOLUTION ORAL AS NEEDED
Status: DISCONTINUED | OUTPATIENT
Start: 2020-02-09 | End: 2020-02-12 | Stop reason: HOSPADM

## 2020-02-09 RX ORDER — MAGNESIUM SULFATE HEPTAHYDRATE 40 MG/ML
2 INJECTION, SOLUTION INTRAVENOUS AS NEEDED
Status: DISCONTINUED | OUTPATIENT
Start: 2020-02-09 | End: 2020-02-12 | Stop reason: HOSPADM

## 2020-02-09 RX ORDER — METOCLOPRAMIDE 5 MG/1
7.5 TABLET ORAL
Status: DISCONTINUED | OUTPATIENT
Start: 2020-02-10 | End: 2020-02-12 | Stop reason: HOSPADM

## 2020-02-09 RX ORDER — POTASSIUM CHLORIDE 20 MEQ/1
40 TABLET, EXTENDED RELEASE ORAL AS NEEDED
Status: DISCONTINUED | OUTPATIENT
Start: 2020-02-09 | End: 2020-02-12 | Stop reason: HOSPADM

## 2020-02-09 RX ADMIN — ANASTROZOLE 1 MG: 1 TABLET ORAL at 08:38

## 2020-02-09 RX ADMIN — PANTOPRAZOLE SODIUM 40 MG: 40 TABLET, DELAYED RELEASE ORAL at 08:39

## 2020-02-09 RX ADMIN — HEPARIN SODIUM 5000 UNITS: 5000 INJECTION INTRAVENOUS; SUBCUTANEOUS at 20:21

## 2020-02-09 RX ADMIN — CYANOCOBALAMIN TAB 1000 MCG 1000 MCG: 1000 TAB at 08:40

## 2020-02-09 RX ADMIN — ROSUVASTATIN CALCIUM 10 MG: 10 TABLET, FILM COATED ORAL at 20:21

## 2020-02-09 RX ADMIN — CETIRIZINE HYDROCHLORIDE 5 MG: 10 TABLET, FILM COATED ORAL at 08:39

## 2020-02-09 RX ADMIN — Medication 10 ML: at 08:41

## 2020-02-09 RX ADMIN — INSULIN LISPRO 4 UNITS: 100 INJECTION, SOLUTION INTRAVENOUS; SUBCUTANEOUS at 20:22

## 2020-02-09 RX ADMIN — SUCRALFATE 1 G: 1 TABLET ORAL at 11:20

## 2020-02-09 RX ADMIN — DOCUSATE SODIUM 100 MG: 100 CAPSULE, LIQUID FILLED ORAL at 20:21

## 2020-02-09 RX ADMIN — LEVOTHYROXINE SODIUM 100 MCG: 100 TABLET ORAL at 08:39

## 2020-02-09 RX ADMIN — MIRTAZAPINE 15 MG: 15 TABLET, FILM COATED ORAL at 20:21

## 2020-02-09 RX ADMIN — HYDROCODONE POLISTIREX AND CHLORPHENIRAMINE POLISTIREX 5 ML: 10; 8 SUSPENSION, EXTENDED RELEASE ORAL at 11:20

## 2020-02-09 RX ADMIN — SUCRALFATE 1 G: 1 TABLET ORAL at 08:39

## 2020-02-09 RX ADMIN — Medication 10 ML: at 20:22

## 2020-02-09 RX ADMIN — IPRATROPIUM BROMIDE AND ALBUTEROL SULFATE 3 ML: .5; 3 SOLUTION RESPIRATORY (INHALATION) at 14:42

## 2020-02-09 RX ADMIN — SUCRALFATE 1 G: 1 TABLET ORAL at 18:13

## 2020-02-09 RX ADMIN — METOCLOPRAMIDE 7.5 MG: 5 TABLET ORAL at 08:40

## 2020-02-09 RX ADMIN — METHYLPREDNISOLONE SODIUM SUCCINATE 40 MG: 40 INJECTION, POWDER, FOR SOLUTION INTRAMUSCULAR; INTRAVENOUS at 20:22

## 2020-02-09 RX ADMIN — POLYETHYLENE GLYCOL 3350 17 G: 17 POWDER, FOR SOLUTION ORAL at 08:40

## 2020-02-09 RX ADMIN — METHYLPREDNISOLONE SODIUM SUCCINATE 40 MG: 40 INJECTION, POWDER, FOR SOLUTION INTRAMUSCULAR; INTRAVENOUS at 13:54

## 2020-02-09 RX ADMIN — IPRATROPIUM BROMIDE AND ALBUTEROL SULFATE 3 ML: .5; 3 SOLUTION RESPIRATORY (INHALATION) at 11:17

## 2020-02-09 RX ADMIN — DOCUSATE SODIUM 100 MG: 100 CAPSULE, LIQUID FILLED ORAL at 08:40

## 2020-02-09 RX ADMIN — SUCRALFATE 1 G: 1 TABLET ORAL at 20:21

## 2020-02-09 RX ADMIN — METHYLPREDNISOLONE SODIUM SUCCINATE 40 MG: 40 INJECTION, POWDER, FOR SOLUTION INTRAMUSCULAR; INTRAVENOUS at 08:41

## 2020-02-09 RX ADMIN — BENZONATATE 200 MG: 100 CAPSULE ORAL at 08:50

## 2020-02-09 RX ADMIN — HYDROCODONE POLISTIREX AND CHLORPHENIRAMINE POLISTIREX 5 ML: 10; 8 SUSPENSION, EXTENDED RELEASE ORAL at 23:32

## 2020-02-09 RX ADMIN — GUAIFENESIN AND DEXTROMETHORPHAN HYDROBROMIDE 2 TABLET: 600; 30 TABLET, EXTENDED RELEASE ORAL at 20:21

## 2020-02-09 RX ADMIN — AMLODIPINE BESYLATE 10 MG: 5 TABLET ORAL at 11:20

## 2020-02-09 RX ADMIN — GUAIFENESIN AND DEXTROMETHORPHAN HYDROBROMIDE 2 TABLET: 600; 30 TABLET, EXTENDED RELEASE ORAL at 08:49

## 2020-02-09 RX ADMIN — HEPARIN SODIUM 5000 UNITS: 5000 INJECTION INTRAVENOUS; SUBCUTANEOUS at 08:39

## 2020-02-09 RX ADMIN — POLYETHYLENE GLYCOL 3350 17 G: 17 POWDER, FOR SOLUTION ORAL at 20:21

## 2020-02-09 RX ADMIN — SERTRALINE 100 MG: 100 TABLET, FILM COATED ORAL at 20:21

## 2020-02-09 RX ADMIN — METHYLPREDNISOLONE SODIUM SUCCINATE 40 MG: 40 INJECTION, POWDER, FOR SOLUTION INTRAMUSCULAR; INTRAVENOUS at 04:31

## 2020-02-09 RX ADMIN — OXYBUTYNIN CHLORIDE 10 MG: 10 TABLET, EXTENDED RELEASE ORAL at 08:41

## 2020-02-09 RX ADMIN — SODIUM CHLORIDE 75 ML/HR: 900 INJECTION, SOLUTION INTRAVENOUS at 15:19

## 2020-02-09 RX ADMIN — FERROUS SULFATE TAB EC 324 MG (65 MG FE EQUIVALENT) 324 MG: 324 (65 FE) TABLET DELAYED RESPONSE at 08:40

## 2020-02-09 NOTE — PLAN OF CARE
Problem: Patient Care Overview  Goal: Plan of Care Review  Outcome: Ongoing (interventions implemented as appropriate)  Flowsheets (Taken 2/9/2020 1225)  Plan of Care Reviewed With: patient; daughter; spouse  Note:   Discussed plan of care with family and patient about improving cough, nausea, and headache complaints from yesterday. No complaints of nausea or headache today. Harsh, nonproductive cough improving with medication.   Goal: Individualization and Mutuality  Outcome: Ongoing (interventions implemented as appropriate)  Goal: Discharge Needs Assessment  Outcome: Ongoing (interventions implemented as appropriate)  Goal: Interprofessional Rounds/Family Conf  Outcome: Ongoing (interventions implemented as appropriate)     Problem: Fall Risk (Adult)  Goal: Identify Related Risk Factors and Signs and Symptoms  Outcome: Ongoing (interventions implemented as appropriate)  Flowsheets (Taken 2/7/2020 0351 by Jin Mcgrath LPN)  Related Risk Factors (Fall Risk): age-related changes;inadequate lighting;environment unfamiliar  Note:   Educated family and patient about ambulating with assistance to the bathroom.   Goal: Absence of Fall  Outcome: Ongoing (interventions implemented as appropriate)  Flowsheets (Taken 2/9/2020 1225)  Absence of Fall: making progress toward outcome  Note:   No falls this shift.      Problem: Breathing Pattern Ineffective (Adult)  Goal: Identify Related Risk Factors and Signs and Symptoms  Outcome: Ongoing (interventions implemented as appropriate)  Flowsheets  Taken 2/7/2020 0351 by Jin Mcgrath LPN  Related Risk Factors (Breathing Pattern Ineffective): advanced age;infection;underlying condition  Taken 2/8/2020 1044 by Nava Baez RN  Signs and Symptoms (Breathing Pattern Ineffective): activity intolerance;breath sounds abnormal  Goal: Effective Oxygenation/Ventilation  Outcome: Ongoing (interventions implemented as appropriate)  Goal: Anxiety/Fear Reduction  Outcome:  Ongoing (interventions implemented as appropriate)     Problem: Skin Injury Risk (Adult)  Goal: Identify Related Risk Factors and Signs and Symptoms  Outcome: Ongoing (interventions implemented as appropriate)  Goal: Skin Health and Integrity  Outcome: Ongoing (interventions implemented as appropriate)

## 2020-02-09 NOTE — PLAN OF CARE
Problem: Patient Care Overview  Goal: Plan of Care Review  Outcome: Ongoing (interventions implemented as appropriate)  Flowsheets (Taken 2/9/2020 7091)  Plan of Care Reviewed With: patient

## 2020-02-10 LAB
GLUCOSE BLDC GLUCOMTR-MCNC: 111 MG/DL (ref 70–105)
GLUCOSE BLDC GLUCOMTR-MCNC: 121 MG/DL (ref 70–105)
GLUCOSE BLDC GLUCOMTR-MCNC: 155 MG/DL (ref 70–105)
GLUCOSE BLDC GLUCOMTR-MCNC: 169 MG/DL (ref 70–105)
MAGNESIUM SERPL-MCNC: 1.6 MG/DL (ref 1.6–2.4)
POTASSIUM BLD-SCNC: 4.4 MMOL/L (ref 3.5–5.2)

## 2020-02-10 PROCEDURE — 83735 ASSAY OF MAGNESIUM: CPT | Performed by: HOSPITALIST

## 2020-02-10 PROCEDURE — 97116 GAIT TRAINING THERAPY: CPT

## 2020-02-10 PROCEDURE — 63710000001 INSULIN LISPRO (HUMAN) PER 5 UNITS: Performed by: HOSPITALIST

## 2020-02-10 PROCEDURE — 97530 THERAPEUTIC ACTIVITIES: CPT

## 2020-02-10 PROCEDURE — 94799 UNLISTED PULMONARY SVC/PX: CPT

## 2020-02-10 PROCEDURE — 97165 OT EVAL LOW COMPLEX 30 MIN: CPT

## 2020-02-10 PROCEDURE — 97535 SELF CARE MNGMENT TRAINING: CPT

## 2020-02-10 PROCEDURE — 82962 GLUCOSE BLOOD TEST: CPT

## 2020-02-10 PROCEDURE — 99232 SBSQ HOSP IP/OBS MODERATE 35: CPT | Performed by: INTERNAL MEDICINE

## 2020-02-10 PROCEDURE — 84132 ASSAY OF SERUM POTASSIUM: CPT | Performed by: HOSPITALIST

## 2020-02-10 PROCEDURE — 97110 THERAPEUTIC EXERCISES: CPT

## 2020-02-10 PROCEDURE — 25010000002 METHYLPREDNISOLONE PER 40 MG: Performed by: NURSE PRACTITIONER

## 2020-02-10 RX ORDER — METHYLPREDNISOLONE SODIUM SUCCINATE 40 MG/ML
40 INJECTION, POWDER, LYOPHILIZED, FOR SOLUTION INTRAMUSCULAR; INTRAVENOUS EVERY 12 HOURS
Status: DISCONTINUED | OUTPATIENT
Start: 2020-02-11 | End: 2020-02-12 | Stop reason: HOSPADM

## 2020-02-10 RX ADMIN — METOCLOPRAMIDE 7.5 MG: 5 TABLET ORAL at 17:02

## 2020-02-10 RX ADMIN — SUCRALFATE 1 G: 1 TABLET ORAL at 17:02

## 2020-02-10 RX ADMIN — SODIUM CHLORIDE 75 ML/HR: 900 INJECTION, SOLUTION INTRAVENOUS at 04:07

## 2020-02-10 RX ADMIN — OXYBUTYNIN CHLORIDE 10 MG: 10 TABLET, EXTENDED RELEASE ORAL at 08:21

## 2020-02-10 RX ADMIN — GUAIFENESIN AND DEXTROMETHORPHAN HYDROBROMIDE 2 TABLET: 600; 30 TABLET, EXTENDED RELEASE ORAL at 21:01

## 2020-02-10 RX ADMIN — METOCLOPRAMIDE 7.5 MG: 5 TABLET ORAL at 08:08

## 2020-02-10 RX ADMIN — BENZONATATE 200 MG: 100 CAPSULE ORAL at 00:43

## 2020-02-10 RX ADMIN — AMLODIPINE BESYLATE 10 MG: 5 TABLET ORAL at 11:47

## 2020-02-10 RX ADMIN — IPRATROPIUM BROMIDE AND ALBUTEROL SULFATE 3 ML: .5; 3 SOLUTION RESPIRATORY (INHALATION) at 05:29

## 2020-02-10 RX ADMIN — PANTOPRAZOLE SODIUM 40 MG: 40 TABLET, DELAYED RELEASE ORAL at 08:21

## 2020-02-10 RX ADMIN — CYANOCOBALAMIN TAB 1000 MCG 1000 MCG: 1000 TAB at 08:21

## 2020-02-10 RX ADMIN — Medication 10 ML: at 21:03

## 2020-02-10 RX ADMIN — CETIRIZINE HYDROCHLORIDE 5 MG: 10 TABLET, FILM COATED ORAL at 08:21

## 2020-02-10 RX ADMIN — SUCRALFATE 1 G: 1 TABLET ORAL at 11:47

## 2020-02-10 RX ADMIN — IPRATROPIUM BROMIDE AND ALBUTEROL SULFATE 3 ML: .5; 3 SOLUTION RESPIRATORY (INHALATION) at 23:29

## 2020-02-10 RX ADMIN — PANTOPRAZOLE SODIUM 40 MG: 40 TABLET, DELAYED RELEASE ORAL at 17:02

## 2020-02-10 RX ADMIN — METHYLPREDNISOLONE SODIUM SUCCINATE 40 MG: 40 INJECTION, POWDER, FOR SOLUTION INTRAMUSCULAR; INTRAVENOUS at 21:01

## 2020-02-10 RX ADMIN — ROSUVASTATIN CALCIUM 10 MG: 10 TABLET, FILM COATED ORAL at 21:01

## 2020-02-10 RX ADMIN — ETHAMBUTOL HYDROCHLORIDE 400 MG: 400 TABLET, FILM COATED ORAL at 08:21

## 2020-02-10 RX ADMIN — FERROUS SULFATE TAB EC 324 MG (65 MG FE EQUIVALENT) 324 MG: 324 (65 FE) TABLET DELAYED RESPONSE at 08:20

## 2020-02-10 RX ADMIN — RIFAMPIN 300 MG: 300 CAPSULE ORAL at 08:08

## 2020-02-10 RX ADMIN — LEVOTHYROXINE SODIUM 100 MCG: 100 TABLET ORAL at 08:08

## 2020-02-10 RX ADMIN — IPRATROPIUM BROMIDE AND ALBUTEROL SULFATE 3 ML: .5; 3 SOLUTION RESPIRATORY (INHALATION) at 18:26

## 2020-02-10 RX ADMIN — METOCLOPRAMIDE 7.5 MG: 5 TABLET ORAL at 21:01

## 2020-02-10 RX ADMIN — POLYETHYLENE GLYCOL 3350 17 G: 17 POWDER, FOR SOLUTION ORAL at 08:07

## 2020-02-10 RX ADMIN — Medication 10 ML: at 08:23

## 2020-02-10 RX ADMIN — DOCUSATE SODIUM 100 MG: 100 CAPSULE, LIQUID FILLED ORAL at 21:01

## 2020-02-10 RX ADMIN — ANASTROZOLE 1 MG: 1 TABLET ORAL at 08:21

## 2020-02-10 RX ADMIN — SUCRALFATE 1 G: 1 TABLET ORAL at 21:01

## 2020-02-10 RX ADMIN — METHYLPREDNISOLONE SODIUM SUCCINATE 40 MG: 40 INJECTION, POWDER, FOR SOLUTION INTRAMUSCULAR; INTRAVENOUS at 04:07

## 2020-02-10 RX ADMIN — SERTRALINE 100 MG: 100 TABLET, FILM COATED ORAL at 21:01

## 2020-02-10 RX ADMIN — SODIUM CHLORIDE 75 ML/HR: 900 INJECTION, SOLUTION INTRAVENOUS at 19:32

## 2020-02-10 RX ADMIN — HYDROCODONE POLISTIREX AND CHLORPHENIRAMINE POLISTIREX 5 ML: 10; 8 SUSPENSION, EXTENDED RELEASE ORAL at 14:34

## 2020-02-10 RX ADMIN — IPRATROPIUM BROMIDE AND ALBUTEROL SULFATE 3 ML: .5; 3 SOLUTION RESPIRATORY (INHALATION) at 10:26

## 2020-02-10 RX ADMIN — MIRTAZAPINE 15 MG: 15 TABLET, FILM COATED ORAL at 21:01

## 2020-02-10 RX ADMIN — METHYLPREDNISOLONE SODIUM SUCCINATE 40 MG: 40 INJECTION, POWDER, FOR SOLUTION INTRAMUSCULAR; INTRAVENOUS at 11:47

## 2020-02-10 RX ADMIN — SUCRALFATE 1 G: 1 TABLET ORAL at 08:21

## 2020-02-10 RX ADMIN — GUAIFENESIN AND DEXTROMETHORPHAN HYDROBROMIDE 2 TABLET: 600; 30 TABLET, EXTENDED RELEASE ORAL at 08:08

## 2020-02-10 RX ADMIN — INSULIN LISPRO 4 UNITS: 100 INJECTION, SOLUTION INTRAVENOUS; SUBCUTANEOUS at 17:01

## 2020-02-10 RX ADMIN — DOCUSATE SODIUM 100 MG: 100 CAPSULE, LIQUID FILLED ORAL at 08:20

## 2020-02-10 RX ADMIN — METOCLOPRAMIDE 7.5 MG: 5 TABLET ORAL at 11:46

## 2020-02-10 RX ADMIN — IPRATROPIUM BROMIDE AND ALBUTEROL SULFATE 3 ML: .5; 3 SOLUTION RESPIRATORY (INHALATION) at 14:50

## 2020-02-10 RX ADMIN — POLYETHYLENE GLYCOL 3350 17 G: 17 POWDER, FOR SOLUTION ORAL at 21:01

## 2020-02-10 RX ADMIN — INSULIN LISPRO 4 UNITS: 100 INJECTION, SOLUTION INTRAVENOUS; SUBCUTANEOUS at 11:46

## 2020-02-10 NOTE — THERAPY EVALUATION
Acute Care - Occupational Therapy Initial Evaluation   [place]     Patient Name: Diane Guan  : 1940  MRN: [id number]  Today's Date: 2/10/2020             Admit Date: 2020       ICD-10-CM ICD-9-CM   1. Acute bronchiolitis due to human metapneumovirus J21.1 466.19   2. Hypothyroidism, unspecified type E03.9 244.9   3. Hypoxemia R09.02 799.02   4. Seasonal allergies J30.2 477.9     Patient Active Problem List   Diagnosis   • Abnormal mammogram   • Abnormality of gait   • Anxiety disorder   • Arthralgia   • Arthritis   • Breast cancer (CMS/HCC)   • Chronic lymphoid leukemia (CMS/HCC)   • Depression   • Gallbladder disorder   • Gastroesophageal reflux disease with hiatal hernia   • General medical exam   • Hay fever   • Hematochezia   • Hyperlipidemia   • Essential hypertension   • Hypothyroidism   • Impaired glucose tolerance   • Increased frequency of urination   • Insomnia   • Leukocytosis   • Mycobacterium avium complex (CMS/HCC)   • Postmenopausal status   • Shoulder pain   • Dependence on supplemental oxygen   • Constipation   • Overactive bladder   • Vitamin B 12 deficiency   • DM hyperosmolarity type II (CMS/HCC)   • Seasonal allergies   • Acute bronchitis due to human metapneumovirus     Past Medical History:   Diagnosis Date   • Acid reflux    • Anxiety disorder    • Arthritis    • Breast cancer (CMS/HCC)    • Depression    • H/O mammogram 2017   • Hemorrhoid    • Hyperlipidemia    • Hypertension    • TB (tuberculosis)     per MD not TB but MAC infection     Past Surgical History:   Procedure Laterality Date   • BLADDER SURGERY     • BRONCHOSCOPY N/A 2019    Procedure: BRONCHOSCOPY with bronchial washing;  Surgeon: Marnie Frankel MD;  Location: [facility];  Service: Pulmonary   • COLONOSCOPY     • CYST REMOVAL      Removed a fatty cyst off of her back , [facility]    • MASTECTOMY Right    • TUBAL ABDOMINAL LIGATION            OT ASSESSMENT FLOWSHEET (last 12 hours)       Occupational Therapy Evaluation     Row Name 02/10/20 1425 02/10/20 1000                OT Evaluation Time/Intention    Subjective Information  complains of;fatigue  -MP  complains of;fatigue  -MP       Document Type  evaluation  -MP  evaluation  -MP       Mode of Treatment  --  occupational therapy  -MP       Patient Effort  --  good  -MP          General Information    Patient Profile Reviewed?  --  yes  -MP       Prior Level of Function  --  independent:;ADL's  -MP       Existing Precautions/Restrictions  --  fall;oxygen therapy device and L/min  -MP          Relationship/Environment    Lives With  --  spouse  -MP       Family Caregiver if Needed  --  grandchild(marco), adult  -MP          Resource/Environmental Concerns    Current Living Arrangements  --  home/apartment/condo  -MP       Resource/Environmental Concerns  --  none  -MP          Cognitive Assessment/Intervention- PT/OT    Orientation Status (Cognition)  --  oriented x 3  -MP       Follows Commands (Cognition)  --  WNL  -MP          Safety Issues, Functional Mobility    Impairments Affecting Function (Mobility)  --  balance  -MP          Bed Mobility Assessment/Treatment    Bed Mobility Assessment/Treatment  --  supine-sit  -MP       Supine-Sit [place] (Bed Mobility)  --  conditional independence  -MP          Functional Mobility    Functional Mobility- Ind. Level  --  contact guard assist;1 person  -MP          Transfer Assessment/Treatment    Transfer Assessment/Treatment  --  toilet transfer  -MP          Sit-Stand Transfer    Sit-Stand [place] (Transfers)  --  supervision;1 person assist  -MP          Toilet Transfer    Type (Toilet Transfer)  --  sit-stand  -MP       [place] Level (Toilet Transfer)  --  contact guard;1 person assist  -MP          ADL Assessment/Intervention    BADL Assessment/Intervention  --  toileting  -MP          Toileting Assessment/Training    [place] Level (Toileting)  --  toileting  skills;adjust/manage clothing;perform perineal hygiene;set up;supervision  -MP       Toileting Position  --  supported sitting  -MP          General ROM    GENERAL ROM COMMENTS  --  BUE WFL  -MP          MMT (Manual Muscle Testing)    General MMT Comments  --  BUE 4/5  -MP          Dynamic Standing Balance    Level of [place], Reaches Outside Midline (Standing, Dynamic Balance)  --  contact guard assist;1 person assist  -MP       Time Able to Maintain Position, Reaches Outside Midline (Standing, Dynamic Balance)  --  2 to 3 minutes  -MP       Assistive Device Utilized (Supported Standing, Dynamic Balance)  --  walker, rolling  -MP          Positioning and Restraints    Pre-Treatment Position  --  in bed  -MP       Post Treatment Position  --  chair  -MP       In Chair  --  call light within reach;encouraged to call for assist;exit alarm on  -MP          Pain Scale: Numbers Pre/Post-Treatment    Pain Scale: Numbers, Pretreatment  --  0/10 - no pain  -MP       Pain Scale: Numbers, Post-Treatment  --  0/10 - no pain  -MP          Plan of Care Review    Plan of Care Reviewed With  --  patient;daughter  -MP       Progress  --  no change  -MP          Clinical Impression (OT)    Criteria for Skilled Therapeutic Interventions Met (OT Eval)  --  yes;treatment indicated  -MP       Rehab Potential (OT Eval)  --  good, to achieve stated therapy goals  -MP       Therapy Frequency (OT Eval)  --  3 times/wk  -MP       Care Plan Review (OT)  --  evaluation/treatment results reviewed  -MP       Care Plan Review, Other Participant (OT Eval)  --  spouse;daughter  -MP       Anticipated Discharge Disposition (OT)  --  home with OP services pulmonary rehab  -MP          OT Goals    Bed Mobility Goal Selection (OT)  --  bed mobility, OT goal 1  -MP       Transfer Goal Selection (OT)  --  transfer, OT goal 1  -MP       Toileting Goal Selection (OT)  --  toileting, OT goal 1  -MP          Bed Mobility Goal 1 (OT)    Activity/Assistive  Device (Bed Mobility Goal 1, OT)  --  bed mobility activities, all  -MP       [place] Level/Cues Needed (Bed Mobility Goal 1, OT)  --  independent  -MP       Time Frame (Bed Mobility Goal 1, OT)  --  long term goal (LTG);2 weeks  -MP          Transfer Goal 1 (OT)    Activity/Assistive Device (Transfer Goal 1, OT)  --  sit-to-stand/stand-to-sit;toilet  -MP       [place] Level/Cues Needed (Transfer Goal 1, OT)  --  independent  -MP       Time Frame (Transfer Goal 1, OT)  --  long term goal (LTG);2 weeks  -MP          Toileting Goal 1 (OT)    Activity/Device (Toileting Goal 1, OT)  --  toileting skills, all  -MP       [place] Level/Cues Needed (Toileting Goal 1, OT)  --  independent  -MP       Time Frame (Toileting Goal 1, OT)  --  long term goal (LTG);2 weeks  -MP         User Key  (r) = Recorded By, (t) = Taken By, (c) = Cosigned By    Initials Name Effective Dates    MP Noam Dunn, OT 03/01/19 -                OT Recommendation and Plan  Outcome Summary/Treatment Plan (OT)  Anticipated Discharge Disposition (OT): home with OP services(pulmonary rehab)  Therapy Frequency (OT Eval): 3 times/wk  Plan of Care Review  Plan of Care Reviewed With: patient, daughter  Plan of Care Reviewed With: patient, daughter  Outcome Summary: Pt. presents w/ increased SOA and fatigue, (+) metapneumovirus. Pt. on 2L O2 at home and now requires 5L O2 desaturating to 88% following ambulatory transfer to and from bathroom. Pt. requires supervision for functional mobility, limited this date secondary to c/o dizziness. Encouraged upright activity to increase pulmonary strength, recommend OP pulmonary at d/c. Will follow up w/ pt. 1-3x per week at [facility].        Time Calculation:   Time Calculation- OT     Row Name 02/10/20 8925             Time Calculation- OT    OT Start Time  0915  -MP      OT Stop Time  0940  -MP      OT Time Calculation (min)  25 min  -MP      Total Timed Code Minutes- OT  16 minute(s)  -MP      OT  Received On  02/10/20  -RAEANN      OT - Next Appointment  02/12/20  -RAEANN      OT Goal Re-Cert Due Date  02/24/20  -RAEANN        User Key  (r) = Recorded By, (t) = Taken By, (c) = Cosigned By    Initials Name Provider Type    Noam Hahn OT Occupational Therapist        Therapy Charges for Today     Code Description Service Date Service Provider Modifiers Qty    00618074173 HC OT EVAL LOW COMPLEXITY 3 2/10/2020 Noam Dunn OT GO 1    64969114035 HC OT THERAPEUTIC ACT EA 15 MIN 2/10/2020 Noam Dunn OT GO 1    [id number]  OT SELF CARE/MGMT/TRAIN EA 15 MIN 2/10/2020 Noam Dunn OT GO 1               Noam Dunn OT  2/10/2020

## 2020-02-10 NOTE — PLAN OF CARE
Problem: Patient Care Overview  Goal: Plan of Care Review  Outcome: Ongoing (interventions implemented as appropriate)  Flowsheets  Taken 2/10/2020 1425  Progress: improving  Taken 2/10/2020 1422  Plan of Care Reviewed With: patient  Outcome Summary: Pt completed bed mob with supervision this date. Pt able to complete toileting with SBA/SUP. Pt increased her amb distance this date and SATS 95% on 6L (No 5L avail on tank). Pt seems more stable and safe. Vitals stable. Home w asst and HH at [place].

## 2020-02-10 NOTE — PLAN OF CARE
Problem: Patient Care Overview  Goal: Plan of Care Review  Flowsheets  Taken 2/10/2020 0660  Progress: no change  Taken 2/9/2020 1901  Plan of Care Reviewed With: patient;daughter  Note:   Patient had restless night. Patient complained of SOB and coughing.  Patient needed oxygen turned up to 5L this morning.  Will continue to monitor.

## 2020-02-10 NOTE — PROGRESS NOTES
[facility] Medicine Services Daily Progress Note      Hospitalist Team  LOS 2 days      Patient Care Team:  Jemima Fontaine MD as PCP - General (Family Medicine)    Patient Location: 203/1      Subjective   Subjective     Chief Complaint / Subjective  Chief Complaint   Patient presents with   • Cough         Brief Synopsis of Hospital Course/HPI  Patient is a 79-year-old female with a history of breast cancer, CLL, HTN, HLD, type II DM, GERD, depression and is undergoing current treatment for MAC pneumonia who presented to the emergency room complaining of cough and headache that began approximately 1 week ago.  Outpatient treatment with antibiotics and steroids did not help and she came to the emergency room where she was diagnosed with human metapneumovirus infection.  She has had some nausea and vomiting with no hematemesis or coffee-ground emesis.  She complains of acute on chronic constipation as well, and she reports that she normally takes Colace and polyethylene glycol at home. She uses nasal cannula O2 at night at home normally.    Date: 2/7/2020: Patient reports persistent chest congestion and cough with wheezing.  She reports that she vomited after she ate a turkey sandwich last night but she was able to keep down an omelette with schreiber this morning.  2/8/2020: The patient reports continued chest congestion and cough.  She has had recurrent episodes of vomiting of the food that she is eaten soon after she eats.  2/9/2020: The patient reports continued bouts of severe cough.  She also complains of several episodes of vomiting, mostly after she eats, but it is coffee-ground in appearance.    ROS  12 point review of systems was reviewed and was negative except as above.    Objective   Objective      Vital Signs  Temp:  [97.9 °F (36.6 °C)-98 °F (36.7 °C)] 98 °F (36.7 °C)  Heart Rate:  [76-98] 88  Resp:  [18-22] 22  BP: (137-151)/(72-73) 137/72  Oxygen Therapy  SpO2: 92 %  Pulse  "Oximetry Type: Intermittent  Device (Oxygen Therapy): nasal cannula  Flow (L/min): 2  Oxygen Concentration (%): 28  Flowsheet Rows      First Filed Value   Admission Height  160 cm (63\") Documented at 02/06/2020 1248   Admission Weight  81.5 kg (179 lb 10.8 oz) Documented at 02/06/2020 1248        Intake & Output (last 3 days)       02/07 0701 - 02/08 0700 02/08 0701 - 02/09 0700 02/09 0701 - 02/10 0700    P.O. 1420  125    Total Intake(mL/kg) 1420 (17.7)  125 (1.6)    Urine (mL/kg/hr) 900 (0.5) 1050 (0.5)     Total Output 900 1050     Net +520 -1050 +125           Urine Unmeasured Occurrence 2 x          Lines, Drains & Airways    Active LDAs     Name:   Placement date:   Placement time:   Site:   Days:    Peripheral IV 02/06/20 1403 Left Forearm   02/06/20    1403    Forearm   less than 1                  Physical Exam:    Physical Exam    Well-developed over-nourished female in no acute distress sitting up in bed double on nasal cannula O2 awake and alert; mucous membranes moist; sclerae anicteric; lungs with diffuse coarse rhonchi but no wheezes bilaterally; CV regular rate and rhythm; abdomen soft nontender nondistended with active bowel sounds; extremities with no edema, cyanosis or calf tenderness; palpable pedal pulses bilaterally; no Bellamy catheter.      Procedures:              Results Review:     I reviewed the patient's new clinical results.      Lab Results (last 24 hours)     Procedure Component Value Units Date/Time    POC Glucose Once [[id number]]  (Abnormal) Collected:  02/09/20 1931    Specimen:  Blood Updated:  02/09/20 1932     Glucose 168 mg/dL      Comment: Serial Number: [id number]:  [id number]       POC Glucose Once [[id number]]  (Abnormal) Collected:  02/09/20 1650    Specimen:  Blood Updated:  02/09/20 1652     Glucose 139 mg/dL      Comment: Serial Number: [id number]:  [id number]       Blood Culture - Blood, Arm, Left [[id number]] Collected:  02/06/20 1434    Specimen:  Blood from " Arm, Left Updated:  02/09/20 1445     Blood Culture No growth at 3 days    Blood Culture - Blood, Arm, Left [[id number]] Collected:  02/06/20 1405    Specimen:  Blood from Arm, Left Updated:  02/09/20 1415     Blood Culture No growth at 3 days    POC Glucose Once [[id number]]  (Abnormal) Collected:  02/09/20 1113    Specimen:  Blood Updated:  02/09/20 1117     Glucose 135 mg/dL      Comment: Serial Number: [id number]:  [id number]       POC Glucose Once [[id number]]  (Abnormal) Collected:  02/09/20 0704    Specimen:  Blood Updated:  02/09/20 0706     Glucose 116 mg/dL      Comment: Serial Number: [id number]:  [id number]           No results found for: HGBA1C            No results found for: LIPASE  Lab Results   Component Value Date    CHOL 374 (H) 01/03/2020    TRIG 311 (H) 01/03/2020    HDL 50 01/03/2020     (H) 01/03/2020       Lab Results   Lab Value Date/Time    FINALDX  02/06/2020 1405     Absolute atypical lymphocytosis.  Anemia.  No blasts identified.  Recommend clinical follow-up and additional studies to include flow cytometry as clinically indicated to exclude CLL/SLL or other lymphoproliferative disorder.      FINALDX  06/25/2019 1136     Absolute atypical lymphocytosis suspicious for CLL/SLL.         Microbiology Results (last 10 days)     Procedure Component Value - Date/Time    Blood Culture - Blood, Arm, Left [[id number]] Collected:  02/06/20 1434    Lab Status:  Preliminary result Specimen:  Blood from Arm, Left Updated:  02/09/20 1445     Blood Culture No growth at 3 days    Respiratory Panel, PCR - Swab, Nasopharynx [[id number]]  (Abnormal) Collected:  02/06/20 1412    Lab Status:  Final result Specimen:  Swab from Nasopharynx Updated:  02/06/20 1611     ADENOVIRUS, PCR Not Detected     Coronavirus 229E Not Detected     Coronavirus HKU1 Not Detected     Coronavirus NL63 Not Detected     Coronavirus OC43 Not Detected     Human Metapneumovirus Detected     Human Rhinovirus/Enterovirus  Not Detected     Influenza B PCR Not Detected     Parainfluenza Virus 1 Not Detected     Parainfluenza Virus 2 Not Detected     Parainfluenza Virus 3 Not Detected     Parainfluenza Virus 4 Not Detected     Bordetella pertussis pcr Not Detected     Influenza A H1 2009 PCR Not Detected     Chlamydophila pneumoniae PCR Not Detected     Mycoplasma pneumo by PCR Not Detected     Influenza A PCR Not Detected     Influenza A H3 Not Detected     Influenza A H1 Not Detected     RSV, PCR Not Detected    Blood Culture - Blood, Arm, Left [[id number]] Collected:  02/06/20 1405    Lab Status:  Preliminary result Specimen:  Blood from Arm, Left Updated:  02/09/20 1415     Blood Culture No growth at 3 days          ECG/EMG Results (most recent)     None                    Xr Chest 1 View    Result Date: 2/6/2020  There is no significant change when compared to the prior study. There is no evidence for acute cardiopulmonary process.  Electronically Signed By-Frederick Lucas On:2/6/2020 3:54 PM This report was finalized on [id number] by  Frederick Lucas, .    Xr Chest Pa & Lateral    Result Date: 2/4/2020  1. No acute cardiopulmonary abnormality. 2. Small hiatal hernia.  Electronically Signed By-Antony Lopez On:2/4/2020 9:40 PM This report was finalized on [id number] by  Antony Lopez .          Xrays, labs reviewed personally by physician.    Medication Review:   I have reviewed the patient's current medication list      Scheduled Meds    amLODIPine 10 mg Oral Daily With Lunch   anastrozole 1 mg Oral Daily   azithromycin 250 mg Oral Once per day on Tue Thu Sat   cetirizine 5 mg Oral Daily   docusate sodium 100 mg Oral BID   ethambutol 400 mg Oral Once per day on Mon Wed Fri   ferrous sulfate 324 mg Oral Daily   guaifenesin-dextromethorphan 2 tablet Oral Q12H   heparin (porcine) 5,000 Units Subcutaneous Q12H   insulin lispro 0-24 Units Subcutaneous 4x Daily With Meals & Nightly   ipratropium-albuterol 3 mL Nebulization Q4H -  RT   levothyroxine 100 mcg Oral QAM AC   methylPREDNISolone sodium succinate 40 mg Intravenous Q8H   metoclopramide 7.5 mg Oral QAM   mirtazapine 15 mg Oral Nightly   oxybutynin XL 10 mg Oral Daily   pantoprazole 40 mg Oral QAM AC   polyethylene glycol 17 g Oral BID   rifAMPin 300 mg Oral Once per day on Mon Wed Fri   rosuvastatin 10 mg Oral Nightly   sertraline 100 mg Oral Nightly   sodium chloride 10 mL Intravenous Q12H   sucralfate 1 g Oral 4x Daily AC & at Bedtime   vitamin B-12 1,000 mcg Oral Daily       Meds Infusions    sodium chloride 75 mL/hr Last Rate: 75 mL/hr (02/09/20 1519)       Meds PRN  •  acetaminophen **OR** acetaminophen **OR** acetaminophen  •  benzonatate  •  dextrose  •  dextrose  •  diphenhydrAMINE  •  glucagon (human recombinant)  •  hydrALAZINE  •  HYDROcod Polst-CPM Polst ER  •  influenza vaccine  •  insulin lispro **AND** insulin lispro  •  ondansetron **OR** ondansetron  •  sodium chloride        Assessment/Plan   Assessment/Plan     Active Hospital Problems    Diagnosis  POA   • **Acute bronchitis due to human metapneumovirus [J20.8]  Yes     Priority: High   • Constipation [K59.00]  Yes   • Overactive bladder [N32.81]  Yes   • Vitamin B 12 deficiency [E53.8]  Yes   • DM hyperosmolarity type II (CMS/HCC) [E11.00]  Yes   • Seasonal allergies [J30.2]  Yes   • Depression [F32.9]  Yes   • Essential hypertension [I10]  Yes   • Hyperlipidemia [E78.5]  Yes   • Mycobacterium avium complex (CMS/HCC) [A31.0]  Yes   • Breast cancer (CMS/HCC) [C50.919]  Yes   • Chronic lymphoid leukemia (CMS/HCC) [C91.10]  Yes   • Hypothyroidism [E03.9]  Yes      Resolved Hospital Problems   No resolved problems to display.       MEDICAL DECISION MAKING COMPLEXITY BY PROBLEM:      Acute bronchitis with reactive airway disease secondary to human metapneumovirus upper respiratory infection associated high-grade fever, nausea and vomiting.   -Continue steroids, Mucinex DM, Tessalon Perles and Tussionex and nebulizer  treatments every 4 hours     Leukocytosis secondary to above   -Monitor    Nausea and vomiting with coffee-ground emesis  -Discontinue prophylactic subcu heparin  -Monitor hemoglobin and check coags  -Continue sucralfate   - increase metoclopramide  -Increase PPI to twice daily  -Antiemetics and supportive care  -May need GI consult if vomiting persists or hemoglobin drops    Acute kidney injury  -  CR 1.2, monitor  - Baseline CR 0.9   - Avoid nephrotoxic medications  - Gentle IV hydration ordered      Acute on chronic headache  -Patient states she gets headaches a lot at home and currently has a headache in which she believes is due to her coughing  -Resolved with IV Compazine and IV Benadryl     Essential hypertension , chronic  - Uncontrolled blood pressure 169/79   - Monitor blood pressure   -Continue amlodipine, holding home losartan due to LEXUS  -Continue IV hydralazine PRN      MAC  -Managed by Dr. Frankel outpatient-patient recently told she just needs to complete the rest of her medications that she has at home and she will be completed with her MAC treatment  -Patient stated she recently did a sleep study   -Patient wears 2 L nasal cannula at night  -Continue azithromycin, ethambutol, rifampin      Diabetes mellitus type 2  - Accuchecks ACHS  -Holding home metformin  -Hemoglobin A1C 5.9   -SSI     Hyperlipidemia  - Continue rosuvastatin      Hypothyroidism  -Continue levothyroxine     Breast cancer status post right mastectomy  -Managed by Dr. Palmer outpatient  -Continue Arimidex     Chronic lymphoid leukemia  -Managed by Dr. Palmer outpatient     Chronic constipation  -Continue polyethylene glycol, metoclopramide and Colace     GERD  -Continue metoclopramide, pantoprazole and carafate     Seasonal allergies  - Continue zyrtec      Depression, chronic  - Continue remeron and zoloft      Overactive bladder  - Continue detrol      Vitamin B 12 deficiency  - Continue vitamin B 12        VTE Prophylaxis  -SCDs        Mechanical Order History:     None      Pharmalogical Order History:     Ordered     Dose Route Frequency Stop    02/06/20 1938  heparin (porcine) 5000 UNIT/ML injection 5,000 Units      5,000 Units SC Every 12 Hours Scheduled --            Code Status -   Code Status and Medical Interventions:   Ordered at: 02/06/20 1825     Level Of Support Discussed With:    Patient     Code Status:    CPR     Medical Interventions (Level of Support Prior to Arrest):    Full       Discharge Planning    Home in 1 to 2 days if clinically improved.      Destination      Coordination has not been started for this encounter.      Durable Medical Equipment      Coordination has not been started for this encounter.      Dialysis/Infusion      Coordination has not been started for this encounter.      Home Medical Care      Coordination has not been started for this encounter.      Therapy      Coordination has not been started for this encounter.      Community Resources      Coordination has not been started for this encounter.            Electronically signed by Ysabel uRbi MD, 02/09/20, 10:43 PM.  [facility] Hospitalist Team

## 2020-02-10 NOTE — PLAN OF CARE
Problem: Patient Care Overview  Goal: Plan of Care Review  Outcome: Ongoing (interventions implemented as appropriate)  Flowsheets  Taken 2/10/2020 1453 by Noam Dunn OT  Progress: no change  Outcome Summary: Pt. presents w/ increased SOA and fatigue, (+) metapneumovirus. Pt. on 2L O2 at home and now requires 5L O2 desaturating to 88% following ambulatory transfer to and from bathroom. Pt. requires supervision for functional mobility, limited this date secondary to c/o dizziness. Encouraged upright activity to increase pulmonary strength, recommend OP pulmonary at d/c. Will follow up w/ pt. 1-3x per week at [facility].  Taken 2/10/2020 1422 by Leonor Harry PTA  Plan of Care Reviewed With: patient

## 2020-02-10 NOTE — PROGRESS NOTES
Continued Stay Note  KELLY Bolton     Patient Name: Diane Guan  MRN: [id number]  Today's Date: 2/10/2020    Admit Date: 2/6/2020    Discharge Plan     Row Name 02/10/20 1245       Plan    Plan  PT recommends HH, list has been provided. Declined on 2/8. May require walking oximetry 24-48 hours prior to discharge. Has HS O2 through [facility]. [facility] to deliver new nebulizer.     Plan Comments  DC barriers: 5 L NC, IV steroids               Emily Carrasco RN

## 2020-02-10 NOTE — THERAPY TREATMENT NOTE
Patient Name: Diane Guan  : 1940    MRN: [id number]                              Today's Date: 2/10/2020       Admit Date: 2020    Visit Dx:     ICD-10-CM ICD-9-CM   1. Acute bronchiolitis due to human metapneumovirus J21.1 466.19   2. Hypothyroidism, unspecified type E03.9 244.9   3. Hypoxemia R09.02 799.02   4. Seasonal allergies J30.2 477.9     Patient Active Problem List   Diagnosis   • Abnormal mammogram   • Abnormality of gait   • Anxiety disorder   • Arthralgia   • Arthritis   • Breast cancer (CMS/HCC)   • Chronic lymphoid leukemia (CMS/HCC)   • Depression   • Gallbladder disorder   • Gastroesophageal reflux disease with hiatal hernia   • General medical exam   • Hay fever   • Hematochezia   • Hyperlipidemia   • Essential hypertension   • Hypothyroidism   • Impaired glucose tolerance   • Increased frequency of urination   • Insomnia   • Leukocytosis   • Mycobacterium avium complex (CMS/HCC)   • Postmenopausal status   • Shoulder pain   • Dependence on supplemental oxygen   • Constipation   • Overactive bladder   • Vitamin B 12 deficiency   • DM hyperosmolarity type II (CMS/HCC)   • Seasonal allergies   • Acute bronchitis due to human metapneumovirus     Past Medical History:   Diagnosis Date   • Acid reflux    • Anxiety disorder    • Arthritis    • Breast cancer (CMS/HCC)    • Depression    • H/O mammogram 2017   • Hemorrhoid    • Hyperlipidemia    • Hypertension    • TB (tuberculosis)     per MD not TB but MAC infection     Past Surgical History:   Procedure Laterality Date   • BLADDER SURGERY     • BRONCHOSCOPY N/A 2019    Procedure: BRONCHOSCOPY with bronchial washing;  Surgeon: Marnie Frankel MD;  Location: [facility] ENDOSCOPY;  Service: Pulmonary   • COLONOSCOPY     • CYST REMOVAL      Removed a fatty cyst off of her back , abstraction from [facility].    • MASTECTOMY Right    • TUBAL ABDOMINAL LIGATION       General Information    No documentation.       Mobility     Row Name  02/10/20 1419          Bed Mobility Assessment/Treatment    Supine-Sit [place] (Bed Mobility)  independent  -MB     Row Name 02/10/20 1421          Transfer Assessment/Treatment    Comment (Transfers)  Pt completed toilet transfers with SBA/SUP. No noted LOB or unsteadiness.   -MB     Row Name 02/10/20 1419          Sit-Stand Transfer    Sit-Stand [place] (Transfers)  supervision  -MB     Assistive Device (Sit-Stand Transfers)  walker, front-wheeled  -MB     Row Name 02/10/20 1419          Gait/Stairs Assessment/Training    Gait/Stairs Assessment/Training  gait/ambulation assistive device  -MB     [place] Level (Gait)  contact guard;supervision  -MB     Assistive Device (Gait)  walker, front-wheeled  -MB     Distance in Feet (Gait)  100x2 with brief standing rest break. 6L and 95%. Pt more stable this date.   -MB     Pattern (Gait)  step-through  -MB     Bilateral Gait Deviations  forward flexed posture;heel strike decreased  -MB       User Key  (r) = Recorded By, (t) = Taken By, (c) = Cosigned By    Initials Name Provider Type    Leonor Eastman PTA Physical Therapy Assistant        Obj/Interventions     Row Name 02/10/20 1421          Therapeutic Exercise    Lower Extremity (Therapeutic Exercise)  hamstring sets, bilateral;LAQ (long arc quad), bilateral;marching while seated  -MB     Lower Extremity Range of Motion (Therapeutic Exercise)  hip abduction/adduction, bilateral;knee flexion/extension, bilateral;ankle dorsiflexion/plantar flexion, bilateral  -MB     Exercise Type (Therapeutic Exercise)  AROM (active range of motion)  -MB     Position (Therapeutic Exercise)  seated  -MB     Sets/Reps (Therapeutic Exercise)  2x10  -MB     Row Name 02/10/20 1421          Static Sitting Balance    Level of [place] (Unsupported Sitting, Static Balance)  supervision  -MB     Sitting Position (Unsupported Sitting, Static Balance)  sitting on edge of bed  -MB     Time Able to Maintain Position (Unsupported  Sitting, Static Balance)  more than 5 minutes  -MB     Row Name 02/10/20 1421          Dynamic Sitting Balance    Level of [place], Reaches Outside Midline (Sitting, Dynamic Balance)  supervision  -MB     Sitting Position, Reaches Outside Midline (Sitting, Dynamic Balance)  sitting on edge of bed  -MB     Row Name 02/10/20 1421          Static Standing Balance    Level of [place] (Supported Standing, Static Balance)  supervision  -MB     Time Able to Maintain Position (Supported Standing, Static Balance)  30 to 45 seconds  -MB     Assistive Device Utilized (Supported Standing, Static Balance)  walker, rolling  -MB     Row Name 02/10/20 1421          Dynamic Standing Balance    Level of [place], Reaches Outside Midline (Standing, Dynamic Balance)  standby assist  -MB     Time Able to Maintain Position, Reaches Outside Midline (Standing, Dynamic Balance)  45 to 60 seconds  -MB     Comment, Reaches Outside Midline (Standing, Dynamic Balance)  washing hands at sink  -MB       User Key  (r) = Recorded By, (t) = Taken By, (c) = Cosigned By    Initials Name Provider Type    Leonor Eastman PTA Physical Therapy Assistant        Goals/Plan    No documentation.       Clinical Impression     Row Name 02/10/20 1422          Pain Scale: Numbers Pre/Post-Treatment    Pain Scale: Numbers, Pretreatment  0/10 - no pain  -MB     Pain Scale: Numbers, Post-Treatment  0/10 - no pain  -MB     Pre/Post Treatment Pain Comment  No pain, did complain of discomfort from non-stop coughing  -MB     Row Name 02/10/20 1422          Plan of Care Review    Plan of Care Reviewed With  patient  -MB     Progress  improving  -MB     Outcome Summary  Pt completed bed mob with supervision this date. Pt able to complete toileting with SBA/SUP. Pt increased her amb distance this date and SATS 95% on 6L (No 5L avail on tank). Pt seems more stable and safe. Vitals stable. Home w asst and HH at [place].  -MB     Row Name 02/10/20 1422           Physical Therapy Clinical Impression    Predicted Duration of Therapy (PT)  Pt has met goals  -MB     Row Name 02/10/20 1422          Vital Signs    Intra SpO2 (%)  95  -MB     O2 Delivery Intra Treatment  supplemental O2 8L  -MB     Row Name 02/10/20 1422          Positioning and Restraints    Pre-Treatment Position  in bed  -MB     Post Treatment Position  bed  -MB     In Bed  notified nsg;fowlers;call light within reach;encouraged to call for assist;exit alarm on  -MB       User Key  (r) = Recorded By, (t) = Taken By, (c) = Cosigned By    Initials Name Provider Type    Leonor Eastman PTA Physical Therapy Assistant        Outcome Measures    No documentation.         PT Recommendation and Plan     Outcome Summary/Treatment Plan (PT)  Anticipated Discharge Disposition (PT): home with assist, home with home health  Plan of Care Reviewed With: patient  Progress: improving  Outcome Summary: Pt completed bed mob with supervision this date. Pt able to complete toileting with SBA/SUP. Pt increased her amb distance this date and SATS 95% on 6L (No 5L avail on tank). Pt seems more stable and safe. Vitals stable. Home w [place]st and HH at [place].     Time Calculation:   PT Charges     Row Name 02/10/20 1414             Time Calculation    Start Time  1305  -MB      Stop Time  1328  -MB      Time Calculation (min)  23 min  -MB      PT Received On  02/10/20  -MB      PT - Next Appointment  02/12/20  -MB         Time Calculation- PT    Total Timed Code Minutes- PT  23 minute(s)  -MB        User Key  (r) = Recorded By, (t) = Taken By, (c) = Cosigned By    Initials Name Provider Type    Leonor Eastman PTA Physical Therapy Assistant        Therapy Charges for Today     Code Description Service Date Service Provider Modifiers Qty    75726471519 HC GAIT TRAINING EA 15 MIN 2/10/2020 Leonor Harry PTA GP 1    14803447695 HC PT THERAPEUTIC ACT EA 15 MIN 2/10/2020 Leonor Harry PTA GP 1    14679475924 HC PT THER PROC EA 15 MIN  2/10/2020 Leonor Harry, PTA GP 1               Leonor Harry, PTA  2/10/2020

## 2020-02-11 LAB
ANION GAP SERPL CALCULATED.3IONS-SCNC: 13 MMOL/L (ref 5–15)
BACTERIA SPEC AEROBE CULT: NORMAL
BACTERIA SPEC AEROBE CULT: NORMAL
BUN BLD-MCNC: 24 MG/DL (ref 8–23)
BUN/CREAT SERPL: 27.6 (ref 7–25)
CALCIUM SPEC-SCNC: 8.8 MG/DL (ref 8.6–10.5)
CHLORIDE SERPL-SCNC: 108 MMOL/L (ref 98–107)
CO2 SERPL-SCNC: 19 MMOL/L (ref 22–29)
CREAT BLD-MCNC: 0.87 MG/DL (ref 0.57–1)
DEPRECATED RDW RBC AUTO: 47.7 FL (ref 37–54)
ERYTHROCYTE [DISTWIDTH] IN BLOOD BY AUTOMATED COUNT: 16 % (ref 12.3–15.4)
GFR SERPL CREATININE-BSD FRML MDRD: 63 ML/MIN/1.73
GLUCOSE BLD-MCNC: 147 MG/DL (ref 65–99)
GLUCOSE BLDC GLUCOMTR-MCNC: 100 MG/DL (ref 70–105)
GLUCOSE BLDC GLUCOMTR-MCNC: 110 MG/DL (ref 70–105)
GLUCOSE BLDC GLUCOMTR-MCNC: 147 MG/DL (ref 70–105)
GLUCOSE BLDC GLUCOMTR-MCNC: 180 MG/DL (ref 70–105)
HCT VFR BLD AUTO: 26.9 % (ref 34–46.6)
HGB BLD-MCNC: 8.5 G/DL (ref 12–15.9)
LYMPHOCYTES # BLD MANUAL: 12.8 10*3/MM3 (ref 0.7–3.1)
LYMPHOCYTES NFR BLD MANUAL: 1 % (ref 5–12)
LYMPHOCYTES NFR BLD MANUAL: 64 % (ref 19.6–45.3)
MAGNESIUM SERPL-MCNC: 1.6 MG/DL (ref 1.6–2.4)
MCH RBC QN AUTO: 26.6 PG (ref 26.6–33)
MCHC RBC AUTO-ENTMCNC: 31.5 G/DL (ref 31.5–35.7)
MCV RBC AUTO: 84.4 FL (ref 79–97)
MONOCYTES # BLD AUTO: 0.2 10*3/MM3 (ref 0.1–0.9)
NEUTROPHILS # BLD AUTO: 7 10*3/MM3 (ref 1.7–7)
NEUTROPHILS NFR BLD MANUAL: 35 % (ref 42.7–76)
PLAT MORPH BLD: NORMAL
PLATELET # BLD AUTO: 166 10*3/MM3 (ref 140–450)
PMV BLD AUTO: 10.1 FL (ref 6–12)
POTASSIUM BLD-SCNC: 4.5 MMOL/L (ref 3.5–5.2)
PROCALCITONIN SERPL-MCNC: 0.05 NG/ML (ref 0.1–0.25)
RBC # BLD AUTO: 3.19 10*6/MM3 (ref 3.77–5.28)
RBC MORPH BLD: NORMAL
SCAN SLIDE: NORMAL
SODIUM BLD-SCNC: 140 MMOL/L (ref 136–145)
WBC MORPH BLD: NORMAL
WBC NRBC COR # BLD: 20 10*3/MM3 (ref 3.4–10.8)

## 2020-02-11 PROCEDURE — 25010000002 METHYLPREDNISOLONE PER 40 MG: Performed by: INTERNAL MEDICINE

## 2020-02-11 PROCEDURE — 63710000001 INSULIN LISPRO (HUMAN) PER 5 UNITS: Performed by: HOSPITALIST

## 2020-02-11 PROCEDURE — 94799 UNLISTED PULMONARY SVC/PX: CPT

## 2020-02-11 PROCEDURE — 80048 BASIC METABOLIC PNL TOTAL CA: CPT | Performed by: INTERNAL MEDICINE

## 2020-02-11 PROCEDURE — 82962 GLUCOSE BLOOD TEST: CPT

## 2020-02-11 PROCEDURE — 85007 BL SMEAR W/DIFF WBC COUNT: CPT | Performed by: INTERNAL MEDICINE

## 2020-02-11 PROCEDURE — 83735 ASSAY OF MAGNESIUM: CPT | Performed by: INTERNAL MEDICINE

## 2020-02-11 PROCEDURE — 84145 PROCALCITONIN (PCT): CPT | Performed by: INTERNAL MEDICINE

## 2020-02-11 PROCEDURE — 99232 SBSQ HOSP IP/OBS MODERATE 35: CPT | Performed by: INTERNAL MEDICINE

## 2020-02-11 PROCEDURE — 85025 COMPLETE CBC W/AUTO DIFF WBC: CPT | Performed by: INTERNAL MEDICINE

## 2020-02-11 RX ORDER — LOSARTAN POTASSIUM 50 MG/1
100 TABLET ORAL
Status: DISCONTINUED | OUTPATIENT
Start: 2020-02-11 | End: 2020-02-12 | Stop reason: HOSPADM

## 2020-02-11 RX ADMIN — LOSARTAN POTASSIUM 100 MG: 50 TABLET, FILM COATED ORAL at 12:09

## 2020-02-11 RX ADMIN — SUCRALFATE 1 G: 1 TABLET ORAL at 20:43

## 2020-02-11 RX ADMIN — IPRATROPIUM BROMIDE AND ALBUTEROL SULFATE 3 ML: .5; 3 SOLUTION RESPIRATORY (INHALATION) at 03:04

## 2020-02-11 RX ADMIN — CETIRIZINE HYDROCHLORIDE 5 MG: 10 TABLET, FILM COATED ORAL at 08:03

## 2020-02-11 RX ADMIN — METHYLPREDNISOLONE SODIUM SUCCINATE 40 MG: 40 INJECTION, POWDER, FOR SOLUTION INTRAMUSCULAR; INTRAVENOUS at 08:03

## 2020-02-11 RX ADMIN — LEVOTHYROXINE SODIUM 100 MCG: 100 TABLET ORAL at 08:06

## 2020-02-11 RX ADMIN — POLYETHYLENE GLYCOL 3350 17 G: 17 POWDER, FOR SOLUTION ORAL at 08:03

## 2020-02-11 RX ADMIN — METOCLOPRAMIDE 7.5 MG: 5 TABLET ORAL at 08:05

## 2020-02-11 RX ADMIN — ANASTROZOLE 1 MG: 1 TABLET ORAL at 08:05

## 2020-02-11 RX ADMIN — AZITHROMYCIN MONOHYDRATE 250 MG: 250 TABLET ORAL at 08:05

## 2020-02-11 RX ADMIN — DOCUSATE SODIUM 100 MG: 100 CAPSULE, LIQUID FILLED ORAL at 08:05

## 2020-02-11 RX ADMIN — METOCLOPRAMIDE 7.5 MG: 5 TABLET ORAL at 12:10

## 2020-02-11 RX ADMIN — PANTOPRAZOLE SODIUM 40 MG: 40 TABLET, DELAYED RELEASE ORAL at 08:05

## 2020-02-11 RX ADMIN — SUCRALFATE 1 G: 1 TABLET ORAL at 12:09

## 2020-02-11 RX ADMIN — MIRTAZAPINE 15 MG: 15 TABLET, FILM COATED ORAL at 20:43

## 2020-02-11 RX ADMIN — POLYETHYLENE GLYCOL 3350 17 G: 17 POWDER, FOR SOLUTION ORAL at 20:46

## 2020-02-11 RX ADMIN — GUAIFENESIN AND DEXTROMETHORPHAN HYDROBROMIDE 2 TABLET: 600; 30 TABLET, EXTENDED RELEASE ORAL at 08:05

## 2020-02-11 RX ADMIN — SUCRALFATE 1 G: 1 TABLET ORAL at 08:05

## 2020-02-11 RX ADMIN — Medication 10 ML: at 20:54

## 2020-02-11 RX ADMIN — IPRATROPIUM BROMIDE AND ALBUTEROL SULFATE 3 ML: .5; 3 SOLUTION RESPIRATORY (INHALATION) at 18:33

## 2020-02-11 RX ADMIN — FERROUS SULFATE TAB EC 324 MG (65 MG FE EQUIVALENT) 324 MG: 324 (65 FE) TABLET DELAYED RESPONSE at 08:03

## 2020-02-11 RX ADMIN — INSULIN LISPRO 4 UNITS: 100 INJECTION, SOLUTION INTRAVENOUS; SUBCUTANEOUS at 20:43

## 2020-02-11 RX ADMIN — IPRATROPIUM BROMIDE AND ALBUTEROL SULFATE 3 ML: .5; 3 SOLUTION RESPIRATORY (INHALATION) at 14:59

## 2020-02-11 RX ADMIN — BENZONATATE 200 MG: 100 CAPSULE ORAL at 00:30

## 2020-02-11 RX ADMIN — OXYBUTYNIN CHLORIDE 10 MG: 10 TABLET, EXTENDED RELEASE ORAL at 08:05

## 2020-02-11 RX ADMIN — SUCRALFATE 1 G: 1 TABLET ORAL at 17:00

## 2020-02-11 RX ADMIN — AMLODIPINE BESYLATE 10 MG: 5 TABLET ORAL at 12:10

## 2020-02-11 RX ADMIN — DOCUSATE SODIUM 100 MG: 100 CAPSULE, LIQUID FILLED ORAL at 20:43

## 2020-02-11 RX ADMIN — METHYLPREDNISOLONE SODIUM SUCCINATE 40 MG: 40 INJECTION, POWDER, FOR SOLUTION INTRAMUSCULAR; INTRAVENOUS at 20:44

## 2020-02-11 RX ADMIN — GUAIFENESIN AND DEXTROMETHORPHAN HYDROBROMIDE 2 TABLET: 600; 30 TABLET, EXTENDED RELEASE ORAL at 20:43

## 2020-02-11 RX ADMIN — Medication 10 ML: at 08:11

## 2020-02-11 RX ADMIN — SODIUM CHLORIDE 75 ML/HR: 900 INJECTION, SOLUTION INTRAVENOUS at 08:49

## 2020-02-11 RX ADMIN — IPRATROPIUM BROMIDE AND ALBUTEROL SULFATE 3 ML: .5; 3 SOLUTION RESPIRATORY (INHALATION) at 06:30

## 2020-02-11 RX ADMIN — ROSUVASTATIN CALCIUM 10 MG: 10 TABLET, FILM COATED ORAL at 20:43

## 2020-02-11 RX ADMIN — PANTOPRAZOLE SODIUM 40 MG: 40 TABLET, DELAYED RELEASE ORAL at 17:01

## 2020-02-11 RX ADMIN — SERTRALINE 100 MG: 100 TABLET, FILM COATED ORAL at 20:42

## 2020-02-11 RX ADMIN — METOCLOPRAMIDE 7.5 MG: 5 TABLET ORAL at 17:01

## 2020-02-11 RX ADMIN — IPRATROPIUM BROMIDE AND ALBUTEROL SULFATE 3 ML: .5; 3 SOLUTION RESPIRATORY (INHALATION) at 10:45

## 2020-02-11 RX ADMIN — HYDROCODONE POLISTIREX AND CHLORPHENIRAMINE POLISTIREX 5 ML: 10; 8 SUSPENSION, EXTENDED RELEASE ORAL at 22:25

## 2020-02-11 RX ADMIN — CYANOCOBALAMIN TAB 1000 MCG 1000 MCG: 1000 TAB at 08:03

## 2020-02-11 RX ADMIN — METOCLOPRAMIDE 7.5 MG: 5 TABLET ORAL at 20:43

## 2020-02-11 NOTE — PROGRESS NOTES
Continued Stay Note   [facility]     Patient Name: Diane Guan  MRN: [id number]  Today's Date: 2/11/2020    Admit Date: 2/6/2020    Discharge Plan     Row Name 02/11/20 1506       Plan    Plan  Pt will return home with spouse, declines outpt pulm rehab and home health. Pt has portable O2 for DC.     Plan Comments  Pt has continuous home O2. Current O2 2 1/2 L pnc. Remains on IV steroids.               Expected Discharge Date and Time     Expected Discharge Date Expected Discharge Time    Feb 12, 2020             Tejal Justin RN

## 2020-02-11 NOTE — CONSULTS
Group: [facility]         CONSULT NOTE    Patient Identification:  Diane Guan  79 y.o.  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 Plummer for surgical consideration  Recent sleep study done last week showed severe obstructive sleep apnea with apnea hypopnea index of 34    Assessment:  Acute bronchitis due to human metapneumovirus    acute COPD exacerbation      severe obstructive sleep apnea  Status post home sleep study 02/03/2020 , AHI 34    Chronic hypoxic respiratory insufficiency  on oxygen 3 L.  O2 was 88% on RA at rest .   improved to 96% today after treatment for MAC,   patient gaining weight after MAC treatment    BAL February 2019 positive for MAC patient has bilateral lower lobe bronchiectasis chronic cough weight loss and worsening hypoxia ,  started treatment in March 2019  s/p repeat bronchoscopy  on 09/13/2019 negative MAC cultures     Large hiatal hernia with reflux,   History of aspiration secondary to vomiting    Status post EGD and naso-duodenal tube placement which was removed at the rehab facility      Breast cancer s/p 2-4 s/p Right simple mastectomy and [place] lymph node biopsy  CLL  Hypothyroidism  Hypertension  Diabetes  GERD  Hyperlipidemia  Osteoarthritis    Recommendations:    The scan of the chest without contrast    Treatment current antibiotic treatment for MAC including azithromycin and ethambutol  and rifampin  Steroids IV Solu-Medrol 40 mg every 12    Bronchodilators  Oxygen titration              Review of Sytems:  Review of Systems   HENT: Positive for congestion, rhinorrhea and sneezing.    Respiratory: Positive for cough, shortness of breath and wheezing. Negative for apnea, choking, chest tightness and stridor.    Cardiovascular: Positive for leg swelling.       Past Medical History:  Past Medical History:   Diagnosis Date   • Acid reflux    • Anxiety disorder    • Arthritis    • Breast cancer (CMS/HCC)    • Depression    • H/O mammogram 08/2017   • Hemorrhoid    • Hyperlipidemia    • Hypertension    • TB (tuberculosis)     per MD not TB but MAC infection       Past Surgical History:  Past Surgical History:   Procedure Laterality Date   • BLADDER SURGERY     • BRONCHOSCOPY N/A 9/13/2019    Procedure: BRONCHOSCOPY with bronchial washing;  Surgeon: Marnie Frankel MD;  Location: [facility] ENDOSCOPY;  Service: Pulmonary   • COLONOSCOPY     • CYST REMOVAL      Removed a fatty cyst off of her back , abstraction from [facility].    • MASTECTOMY Right    • TUBAL ABDOMINAL LIGATION          Home Meds:  Medications Prior to Admission   Medication Sig Dispense Refill Last Dose   • amLODIPine (NORVASC) 10 MG tablet Take 1 tablet by mouth Daily With Lunch. 90 tablet 1 2/5/2020 at Unknown time   • anastrozole (ARIMIDEX) 1 MG tablet Take 1 mg by mouth Daily.  3 2/5/2020 at Unknown time   • azithromycin (ZITHROMAX) 250 MG tablet Take 250 mg by mouth 3 (Three) Times a Week. Every Tuesday, Thursday, and Saturday   Past Week at Unknown time   • cetirizine (zyrTEC) 10 MG tablet Take 10 mg by mouth Daily.   2/5/2020 at Unknown time   • chlorthalidone (HYGROTON) 25 MG tablet Take 1 tablet by mouth Daily. 90 tablet 1 2/5/2020 at Unknown time   • docusate sodium (COLACE) 100 MG capsule Take 100 mg by mouth 2 (Two) Times a Day.  3 2/5/2020 at Unknown time   • ethambutol (MYAMBUTOL) 400 MG tablet Take 400 mg by mouth 3 (Three) Times a  Week. Every Monday, Wednesday, and Friday  0 2/5/2020 at Unknown time   • ferrous sulfate 324 (65 Fe) MG tablet delayed-release EC tablet Take 324 mg by mouth Daily.   2/5/2020 at Unknown time   • levoFLOXacin (LEVAQUIN) 500 MG tablet Take 1 tablet by mouth Daily for 10 days. 10 tablet 0 2/5/2020 at Unknown time   • levothyroxine (SYNTHROID, LEVOTHROID) 100 MCG tablet Take 1 tablet by mouth Every Morning Before Breakfast. Take on empty stomach. Thyroid needs to be rechecked. (Patient taking differently: Take 75 mcg by mouth Daily. Take on empty stomach. Thyroid needs to be rechecked.) 30 tablet 0 2/5/2020 at Unknown time   • losartan (COZAAR) 100 MG tablet Take 1 tablet by mouth Daily. 90 tablet 1 2/5/2020 at Unknown time   • metFORMIN ER (GLUCOPHAGE-XR) 500 MG 24 hr tablet Take 500 mg by mouth Daily With Breakfast.   2/5/2020 at Unknown time   • metoclopramide (REGLAN) 10 MG tablet Take 10 mg by mouth Every Morning.   2/5/2020 at Unknown time   • metoclopramide (REGLAN) 5 MG tablet Take 1 tablet by mouth 3 (Three) Times a Day As Needed (for nausea and vomiting). (Patient taking differently: Take 5 mg by mouth 4 (Four) Times a Day.) 60 tablet 1 2/5/2020 at Unknown time   • mirtazapine (REMERON) 15 MG tablet Take 1 tablet by mouth Every Night. 90 tablet 1 2/5/2020 at Unknown time   • pantoprazole (PROTONIX) 40 MG EC tablet Take 1 tablet by mouth Every Morning Before Breakfast. Take on an empty stomach! 90 tablet 1 2/5/2020 at Unknown time   • predniSONE (DELTASONE) 10 MG tablet Take 10 mg by mouth Take As Directed.   2/5/2020 at Unknown time   • rifAMPin (RIFADIN) 300 MG capsule Take 300 mg by mouth 3 (Three) Times a Week.   2/5/2020 at Unknown time   • rosuvastatin (CRESTOR) 10 MG tablet Take 1 tablet by mouth Every Night. 90 tablet 1 2/5/2020 at Unknown time   • sertraline (ZOLOFT) 100 MG tablet Take 1 tablet by mouth Every Night. 90 tablet 1 2/5/2020 at Unknown time   • sucralfate (CARAFATE) 1 g tablet Take 1  "tablet by mouth 4 (Four) Times a Day Before Meals & at Bedtime. 360 tablet 1 2/5/2020 at Unknown time   • tolterodine (DETROL) 2 MG tablet Take 1 tablet by mouth 2 (Two) Times a Day. 180 tablet 1 2/5/2020 at Unknown time   • vitamin B-12 (CYANOCOBALAMIN) 500 MCG tablet Take 1,000 mcg by mouth Daily.   2/5/2020 at Unknown time       Allergies:  No Known Allergies    Social History:   Social History     Socioeconomic History   • Marital status:      Spouse name: Not on file   • Number of children: Not on file   • Years of education: Not on file   • Highest education level: Not on file   Tobacco Use   • Smoking status: Former Smoker   • Smokeless tobacco: Never Used   Substance and Sexual Activity   • Alcohol use: No     Frequency: Never   • Drug use: No   • Sexual activity: Defer       Family History:  Family History   Problem Relation Age of Onset   • Diabetes Mother    • Arthritis Other    • Heart disease Other        Physical Exam:  /68 (BP Location: Left arm, Patient Position: Lying)   Pulse 87   Temp 97.5 °F (36.4 °C) (Oral)   Resp 21   Ht 160 cm (63\")   Wt 83.3 kg (183 lb 9.6 oz)   SpO2 92%   BMI 32.52 kg/m²  Body mass index is 32.52 kg/m². 92% 83.3 kg (183 lb 9.6 oz)  Physical Exam   Cardiovascular:   No murmur heard.  Pulmonary/Chest: No respiratory distress. She has wheezes. She has rales. She exhibits no tenderness.   Abdominal: She exhibits no distension. There is no tenderness.   Musculoskeletal: She exhibits edema.       LABS:  Lab Results   Component Value Date    CALCIUM 8.8 02/11/2020    PHOS 2.6 02/09/2020     Results from last 7 days   Lab Units 02/11/20  0207 02/10/20  0408 02/09/20  2306 02/08/20  0238 02/06/20  1405   MAGNESIUM mg/dL 1.6 1.6 1.7  --   --    SODIUM mmol/L 140  --  142 140 142   POTASSIUM mmol/L 4.5 4.4 4.8 4.1 4.0   CHLORIDE mmol/L 108*  --  112* 108* 105   CO2 mmol/L 19.0*  --  20.0* 20.0* 21.0*   BUN mg/dL 24*  --  26* 33* 22   CREATININE mg/dL 0.87  --  0.98 " 1.04* 1.21*   GLUCOSE mg/dL 147*  --  138* 151* 116*   CALCIUM mg/dL 8.8  --  8.9 8.8 8.9   WBC 10*3/mm3 20.00*  --  20.60* 14.00* 15.10*   HEMOGLOBIN g/dL 8.5*  --  9.4* 10.0* 11.7*   PLATELETS 10*3/mm3 166  --  177 147 171   ALT (SGPT) U/L  --   --  10  --  10   AST (SGOT) U/L  --   --  13  --  16   PROCALCITONIN ng/mL 0.05*  --   --   --   --      No results found for: CKTOTAL, CKMB, CKMBINDEX, TROPONINI, TROPONINT      Results from last 7 days   Lab Units 02/06/20  1434 02/06/20  1405   BLOODCX  No growth at 4 days No growth at 4 days     Results from last 7 days   Lab Units 02/11/20  0207 02/06/20  1409   PROCALCITONIN ng/mL 0.05*  --    LACTATE mmol/L  --  1.4         Results from last 7 days   Lab Units 02/06/20  1412   ADENOVIRUS DETECTION BY PCR  Not Detected   CORONAVIRUS 229E  Not Detected   CORONAVIRUS HKU1  Not Detected   CORONAVIRUS NL63  Not Detected   CORONAVIRUS OC43  Not Detected   HUMAN METAPNEUMOVIRUS  Detected*   HUMAN RHINOVIRUS/ENTEROVIRUS  Not Detected   INFLUENZA B PCR  Not Detected   PARAINFLUENZA 1  Not Detected   PARAINFLUENZA VIRUS 2  Not Detected   PARAINFLUENZA VIRUS 3  Not Detected   PARAINFLUENZA VIRUS 4  Not Detected   BORDETELLA PERTUSSIS PCR  Not Detected   CHLAMYDOPHILA PNEUMONIAE PCR  Not Detected   MYCOPLAMA PNEUMO PCR  Not Detected   INFLUENZA A PCR  Not Detected   INFLUENZA A H3  Not Detected   INFLUENZA A H1  Not Detected   RSV, PCR  Not Detected     Results from last 7 days   Lab Units 02/09/20  2306   INR  0.93     Results from last 7 days   Lab Units 02/06/20  1434 02/06/20  1405   BLOODCX  No growth at 4 days No growth at 4 days     Lab Results   Component Value Date    TSH 1.610 02/06/2020     Estimated Creatinine Clearance: 54.5 mL/min (by C-G formula based on SCr of 0.87 mg/dL).         Imaging:  Imaging Results (Last 24 Hours)     ** No results found for the last 24 hours. **            Current Meds:   SCHEDULE    amLODIPine 10 mg Oral Daily With Lunch   anastrozole  1 mg Oral Daily   azithromycin 250 mg Oral Once per day on Tue Thu Sat   cetirizine 5 mg Oral Daily   docusate sodium 100 mg Oral BID   ethambutol 400 mg Oral Once per day on Mon Wed Fri   ferrous sulfate 324 mg Oral Daily   guaifenesin-dextromethorphan 2 tablet Oral Q12H   insulin lispro 0-24 Units Subcutaneous 4x Daily With Meals & Nightly   ipratropium-albuterol 3 mL Nebulization Q4H - RT   levothyroxine 100 mcg Oral QAM AC   losartan 100 mg Oral Q24H   methylPREDNISolone sodium succinate 40 mg Intravenous Q12H   metoclopramide 7.5 mg Oral 4x Daily AC & at Bedtime   mirtazapine 15 mg Oral Nightly   oxybutynin XL 10 mg Oral Daily   pantoprazole 40 mg Oral BID AC   polyethylene glycol 17 g Oral BID   rifAMPin 300 mg Oral Once per day on Mon Wed Fri   rosuvastatin 10 mg Oral Nightly   sertraline 100 mg Oral Nightly   sodium chloride 10 mL Intravenous Q12H   sucralfate 1 g Oral 4x Daily AC & at Bedtime   vitamin B-12 1,000 mcg Oral Daily     Infusions     PRNs  •  acetaminophen **OR** acetaminophen **OR** acetaminophen  •  benzonatate  •  dextrose  •  dextrose  •  diphenhydrAMINE  •  glucagon (human recombinant)  •  hydrALAZINE  •  HYDROcod Polst-CPM Polst ER  •  influenza vaccine  •  insulin lispro **AND** insulin lispro  •  magnesium sulfate **OR** magnesium sulfate in D5W 1g/100mL (PREMIX)  •  ondansetron **OR** ondansetron  •  potassium chloride **OR** potassium chloride **OR** potassium chloride  •  potassium phosphate infusion greater than 15 mMoles **OR** potassium phosphate infusion greater than 15 mMoles **OR** potassium phosphate **OR** sodium phosphate IVPB **OR** sodium phosphate IVPB **OR** sodium phosphate IVPB  •  sodium chloride        Marnie Frankel MD  2/11/2020  1:40 PM      Much of this encounter note is an electronic transcription/translation of spoken language to printed text using Dragon Software.

## 2020-02-11 NOTE — PROGRESS NOTES
[facility] Medicine Services Daily Progress Note      Hospitalist Team  LOS 4 days      Patient Care Team:  Jemima Fontaine MD as PCP - General (Family Medicine)    Patient Location: 203/1      Subjective   Subjective     Chief Complaint / Subjective  Chief Complaint   Patient presents with   • Cough         Brief Synopsis of Hospital Course/HPI  Patient is a 79-year-old female with a history of breast cancer, CLL, HTN, HLD, type II DM, GERD, depression and is undergoing current treatment for MAC pneumonia who presented to the emergency room complaining of cough and headache that began approximately 1 week ago.  Outpatient treatment with antibiotics and steroids did not help and she came to the emergency room where she was diagnosed with human metapneumovirus infection.  She has had some nausea and vomiting with no hematemesis or coffee-ground emesis.  She complains of acute on chronic constipation as well, and she reports that she normally takes Colace and polyethylene glycol at home. She uses nasal cannula O2 at night at home normally.    Date: 2/7/2020: Patient reports persistent chest congestion and cough with wheezing.  She reports that she vomited after she ate a turkey sandwich last night but she was able to keep down an omelette with schreiber this morning.  2/8/2020: The patient reports continued chest congestion and cough.  She has had recurrent episodes of vomiting of the food that she is eaten soon after she eats.  2/9/2020: The patient reports continued bouts of severe cough.  She also complains of several episodes of vomiting, mostly after she eats, but it is coffee-ground in appearance.  2/10/2020: Patient reports her shortness of breath is worse this am and her oxygen had to be increased.     2/11/2020- pt still complaining of severe shortness of breath, seems out of proportion to condition.     Review of Systems   Constitution: Negative for chills, fever, malaise/fatigue  "and night sweats.   Cardiovascular: Negative for chest pain and palpitations.   Respiratory: Positive for cough and shortness of breath. Negative for wheezing.    Gastrointestinal: Positive for vomiting. Negative for abdominal pain and nausea.         Objective   Objective      Vital Signs  Temp:  [97.5 °F (36.4 °C)-97.9 °F (36.6 °C)] 97.5 °F (36.4 °C)  Heart Rate:  [82-93] 88  Resp:  [16-22] 20  BP: (156-174)/(68-76) 164/68  Oxygen Therapy  SpO2: 92 %  Pulse Oximetry Type: Intermittent  Device (Oxygen Therapy): nasal cannula  Flow (L/min): 2.5  Oxygen Concentration (%): 36  Flowsheet Rows      First Filed Value   Admission Height  160 cm (63\") Documented at 02/06/2020 1248   Admission Weight  81.5 kg (179 lb 10.8 oz) Documented at 02/06/2020 1248        Intake & Output (last 3 days)       02/08 0701 - 02/09 0700 02/09 0701 - 02/10 0700 02/10 0701 - 02/11 0700 02/11 0701 - 02/12 0700    P.O.  125 720 480    I.V. (mL/kg)   1000 (12)     Total Intake(mL/kg)  125 (1.6) 1720 (20.6) 480 (5.8)    Urine (mL/kg/hr) 1050 (0.5)       Total Output 1050       Net -1050 +125 +1720 +480            Urine Unmeasured Occurrence   1 x 2 x        Lines, Drains & Airways    Active LDAs     Name:   Placement date:   Placement time:   Site:   Days:    Peripheral IV 02/06/20 1403 Left Forearm   02/06/20    1403    Forearm   less than 1                  Physical Exam:    Physical Exam   Constitutional: She is oriented to person, place, and time. She appears well-developed. No distress.   Obese, elderly female    HENT:   Head: Normocephalic and atraumatic.   Mouth/Throat: Oropharynx is clear and moist.   Eyes: Pupils are equal, round, and reactive to light. No scleral icterus.   Cardiovascular: Normal rate and regular rhythm.   No murmur heard.  Pulmonary/Chest: No stridor.   Diffused rhonchi and expiratory wheezing, nonlabored   Abdominal: Soft. Bowel sounds are normal. She exhibits no distension. There is no tenderness.   Neurological: " She is alert and oriented to person, place, and time. No cranial nerve deficit.   Skin: Skin is warm and dry. No rash noted.   Psychiatric: She has a normal mood and affect. Her behavior is normal.   Vitals reviewed.        Procedures:              Results Review:     I reviewed the patient's new clinical results.      Lab Results (last 24 hours)     Procedure Component Value Units Date/Time    Blood Culture - Blood, Arm, Left [[id number]] Collected:  02/06/20 1434    Specimen:  Blood from Arm, Left Updated:  02/11/20 1445     Blood Culture No growth at 5 days    Blood Culture - Blood, Arm, Left [[id number]] Collected:  02/06/20 1405    Specimen:  Blood from Arm, Left Updated:  02/11/20 1415     Blood Culture No growth at 5 days    POC Glucose Once [[id number]]  (Abnormal) Collected:  02/11/20 1149    Specimen:  Blood Updated:  02/11/20 1205     Glucose 147 mg/dL      Comment: Serial Number: [id number]:  [id number]       POC Glucose Once [[id number]]  (Normal) Collected:  02/11/20 0715    Specimen:  Blood Updated:  02/11/20 0718     Glucose 100 mg/dL      Comment: Serial Number: [id number]:  [id number]       CBC & Differential [[id number]] Collected:  02/11/20 0207    Specimen:  Blood Updated:  02/11/20 0613    Narrative:       The following orders were created for panel order CBC & Differential.  Procedure                               Abnormality         Status                     ---------                               -----------         ------                     CBC Auto Differential[[id number]]        Abnormal            Final result                 Please view results for these tests on the individual orders.    CBC Auto Differential [[id number]]  (Abnormal) Collected:  02/11/20 0207    Specimen:  Blood Updated:  02/11/20 0613     WBC 20.00 10*3/mm3      RBC 3.19 10*6/mm3      Hemoglobin 8.5 g/dL      Hematocrit 26.9 %      MCV 84.4 fL      MCH 26.6 pg      MCHC 31.5 g/dL      RDW 16.0 %      RDW-SD  "47.7 fl      MPV 10.1 fL      Platelets 166 10*3/mm3     Scan Slide [[id number]] Collected:  02/11/20 0207    Specimen:  Blood Updated:  02/11/20 0613     Scan Slide --     Comment: See Manual Differential Results       Manual Differential [[id number]]  (Abnormal) Collected:  02/11/20 0207    Specimen:  Blood Updated:  02/11/20 0613     Neutrophil % 35.0 %      Lymphocyte % 64.0 %      Monocyte % 1.0 %      Neutrophils Absolute 7.00 10*3/mm3      Lymphocytes Absolute 12.80 10*3/mm3      Monocytes Absolute 0.20 10*3/mm3      RBC Morphology Normal     WBC Morphology Normal     Platelet Morphology Normal    Narrative:       Reviewed by Pathologist within the past 30 days on 2/7/2020 .      Procalcitonin [[id number]]  (Abnormal) Collected:  02/11/20 0207    Specimen:  Blood Updated:  02/11/20 0443     Procalcitonin 0.05 ng/mL     Narrative:       As a Marker for Sepsis (Non-Neonates):   1. <0.5 ng/mL represents a low risk of severe sepsis and/or septic shock.  1. >2 ng/mL represents a high risk of severe sepsis and/or septic shock.    As a Marker for Lower Respiratory Tract Infections that require antibiotic therapy:  PCT on Admission     Antibiotic Therapy             6-12 Hrs later  > 0.5                Strongly Recommended            >0.25 - <0.5         Recommended  0.1 - 0.25           Discouraged                   Remeasure/reassess PCT  <0.1                 Strongly Discouraged          Remeasure/reassess PCT      As 28 day mortality risk marker: \"Change in Procalcitonin Result\" (> 80 % or <=80 %) if Day 0 (or Day 1) and Day 4 values are available. Refer to http://www.[employer]-pct-calculator.com/   Change in PCT <=80 %   A decrease of PCT levels below or equal to 80 % defines a positive change in PCT test result representing a higher risk for 28-day all-cause mortality of patients diagnosed with severe sepsis or septic shock.  Change in PCT > 80 %   A decrease of PCT levels of more than 80 % defines a negative change " in PCT result representing a lower risk for 28-day all-cause mortality of patients diagnosed with severe sepsis or septic shock.                Results may be falsely decreased if patient taking Biotin.     Basic Metabolic Panel [[id number]]  (Abnormal) Collected:  02/11/20 0207    Specimen:  Blood Updated:  02/11/20 0437     Glucose 147 mg/dL      BUN 24 mg/dL      Creatinine 0.87 mg/dL      Sodium 140 mmol/L      Potassium 4.5 mmol/L      Chloride 108 mmol/L      CO2 19.0 mmol/L      Calcium 8.8 mg/dL      eGFR Non African Amer 63 mL/min/1.73      BUN/Creatinine Ratio 27.6     Anion Gap 13.0 mmol/L     Narrative:       GFR Normal >60  Chronic Kidney Disease <60  Kidney Failure <15      Magnesium [[id number]]  (Normal) Collected:  02/11/20 0207    Specimen:  Blood Updated:  02/11/20 0437     Magnesium 1.6 mg/dL     POC Glucose Once [[id number]]  (Abnormal) Collected:  02/10/20 2041    Specimen:  Blood Updated:  02/10/20 2049     Glucose 111 mg/dL      Comment: Serial Number: [id number]:  [id number]       POC Glucose Once [[id number]]  (Abnormal) Collected:  02/10/20 1640    Specimen:  Blood Updated:  02/10/20 1643     Glucose 169 mg/dL      Comment: Serial Number: [id number]:  [id number]           No results found for: HGBA1C  Results from last 7 days   Lab Units 02/09/20  2306   INR  0.93           No results found for: LIPASE  Lab Results   Component Value Date    CHOL 374 (H) 01/03/2020    TRIG 311 (H) 01/03/2020    HDL 50 01/03/2020     (H) 01/03/2020       Lab Results   Lab Value Date/Time    FINALDX  02/06/2020 1405     Absolute atypical lymphocytosis.  Anemia.  No blasts identified.  Recommend clinical follow-up and additional studies to include flow cytometry as clinically indicated to exclude CLL/SLL or other lymphoproliferative disorder.      FINALDX  06/25/2019 1136     Absolute atypical lymphocytosis suspicious for CLL/SLL.         Microbiology Results (last 10 days)     Procedure  Component Value - Date/Time    Blood Culture - Blood, Arm, Left [[id number]] Collected:  02/06/20 1434    Lab Status:  Final result Specimen:  Blood from Arm, Left Updated:  02/11/20 1445     Blood Culture No growth at 5 days    Respiratory Panel, PCR - Swab, Nasopharynx [[id number]]  (Abnormal) Collected:  02/06/20 1412    Lab Status:  Final result Specimen:  Swab from Nasopharynx Updated:  02/06/20 1611     ADENOVIRUS, PCR Not Detected     Coronavirus 229E Not Detected     Coronavirus HKU1 Not Detected     Coronavirus NL63 Not Detected     Coronavirus OC43 Not Detected     Human Metapneumovirus Detected     Human Rhinovirus/Enterovirus Not Detected     Influenza B PCR Not Detected     Parainfluenza Virus 1 Not Detected     Parainfluenza Virus 2 Not Detected     Parainfluenza Virus 3 Not Detected     Parainfluenza Virus 4 Not Detected     Bordetella pertussis pcr Not Detected     Influenza A H1 2009 PCR Not Detected     Chlamydophila pneumoniae PCR Not Detected     Mycoplasma pneumo by PCR Not Detected     Influenza A PCR Not Detected     Influenza A H3 Not Detected     Influenza A H1 Not Detected     RSV, PCR Not Detected    Blood Culture - Blood, Arm, Left [[id number]] Collected:  02/06/20 1405    Lab Status:  Final result Specimen:  Blood from Arm, Left Updated:  02/11/20 1415     Blood Culture No growth at 5 days          ECG/EMG Results (most recent)     None                    Xr Chest 1 View    Result Date: 2/6/2020  There is no significant change when compared to the prior study. There is no evidence for acute cardiopulmonary process.  Electronically Signed By-Frederick Lucas On:2/6/2020 3:54 PM This report was finalized on [id number] by  Frederick Lucas, .    Xr Chest Pa & Lateral    Result Date: 2/4/2020  1. No acute cardiopulmonary abnormality. 2. Small hiatal hernia.  Electronically Signed By-Antony Lopez On:2/4/2020 9:40 PM This report was finalized on [id number] by  Antony Lopez,  .          Xrays, labs reviewed personally by physician.    Medication Review:   I have reviewed the patient's current medication list      Scheduled Meds    amLODIPine 10 mg Oral Daily With Lunch   anastrozole 1 mg Oral Daily   azithromycin 250 mg Oral Once per day on Tue Thu Sat   cetirizine 5 mg Oral Daily   docusate sodium 100 mg Oral BID   ethambutol 400 mg Oral Once per day on Mon Wed Fri   ferrous sulfate 324 mg Oral Daily   guaifenesin-dextromethorphan 2 tablet Oral Q12H   insulin lispro 0-24 Units Subcutaneous 4x Daily With Meals & Nightly   ipratropium-albuterol 3 mL Nebulization Q4H - RT   levothyroxine 100 mcg Oral QAM AC   losartan 100 mg Oral Q24H   methylPREDNISolone sodium succinate 40 mg Intravenous Q12H   metoclopramide 7.5 mg Oral 4x Daily AC & at Bedtime   mirtazapine 15 mg Oral Nightly   oxybutynin XL 10 mg Oral Daily   pantoprazole 40 mg Oral BID AC   polyethylene glycol 17 g Oral BID   rifAMPin 300 mg Oral Once per day on Mon Wed Fri   rosuvastatin 10 mg Oral Nightly   sertraline 100 mg Oral Nightly   sodium chloride 10 mL Intravenous Q12H   sucralfate 1 g Oral 4x Daily AC & at Bedtime   vitamin B-12 1,000 mcg Oral Daily       Meds Infusions       Meds PRN  •  acetaminophen **OR** acetaminophen **OR** acetaminophen  •  benzonatate  •  dextrose  •  dextrose  •  diphenhydrAMINE  •  glucagon (human recombinant)  •  hydrALAZINE  •  HYDROcod Polst-CPM Polst ER  •  influenza vaccine  •  insulin lispro **AND** insulin lispro  •  magnesium sulfate **OR** magnesium sulfate in D5W 1g/100mL (PREMIX)  •  ondansetron **OR** ondansetron  •  potassium chloride **OR** potassium chloride **OR** potassium chloride  •  potassium phosphate infusion greater than 15 mMoles **OR** potassium phosphate infusion greater than 15 mMoles **OR** potassium phosphate **OR** sodium phosphate IVPB **OR** sodium phosphate IVPB **OR** sodium phosphate IVPB  •  sodium chloride        Assessment/Plan   Assessment/Plan     Active  Hospital Problems    Diagnosis  POA   • **Acute bronchitis due to human metapneumovirus [J20.8]  Yes   • Constipation [K59.00]  Yes   • Overactive bladder [N32.81]  Yes   • Vitamin B 12 deficiency [E53.8]  Yes   • DM hyperosmolarity type II (CMS/HCC) [E11.00]  Yes   • Seasonal allergies [J30.2]  Yes   • Depression [F32.9]  Yes   • Essential hypertension [I10]  Yes   • Hyperlipidemia [E78.5]  Yes   • Mycobacterium avium complex (CMS/HCC) [A31.0]  Yes   • Breast cancer (CMS/HCC) [C50.919]  Yes   • Chronic lymphoid leukemia (CMS/HCC) [C91.10]  Yes   • Hypothyroidism [E03.9]  Yes      Resolved Hospital Problems   No resolved problems to display.       MEDICAL DECISION MAKING COMPLEXITY BY PROBLEM:     Acute bronchitis with reactive airway disease secondary to human metapneumovirus upper respiratory infection associated high-grade fever, nausea and vomiting.   - wean steroids   - muvinex DM  - Tessalon Perles and Tussionex and nebulizer treatments every 4 hours   - due to patients continued symptoms and seemingly no improvement in breathing will ask pulmonology to see     Leukocytosis likely d/t CLL and acute respiratory infection   - procal negative     Nausea and vomiting with coffee-ground emesis  - Discontinue prophylactic subcu heparin  - Monitor hemoglobin and check coags  - Continue sucralfate   - increase metoclopramide  - Increase PPI to twice daily  - Antiemetics and supportive care  - May need GI consult if vomiting persists or hemoglobin drops    Acute renal insufficiency   - Baseline CR 0.9   - Avoid nephrotoxic medications  - Gentle IV hydration ordered      Acute on chronic headache  - Patient states she gets headaches a lot at home and currently has a headache in which she believes is due to her coughing  - Resolved with IV Compazine and IV Benadryl     Essential hypertension , chronic  - Uncontrolled blood pressure 169/79   - Monitor blood pressure   -Continue amlodipine, resume losartan   -Continue IV  hydralazine PRN      MYCOBACTERIUM AVIUM COMPLEX   -Managed by Dr. Frankel outpatient-patient recently told she just needs to complete the rest of her medications that she has at home and she will be completed with her MAC treatment  -Patient stated she recently did a sleep study   -Patient wears 2 L nasal cannula at night  -Continue azithromycin, ethambutol, rifampin      Diabetes mellitus type 2  - Accuchecks ACHS  - Holding home metformin  - Hemoglobin A1C 5.9   - SSI     Hyperlipidemia  - Continue rosuvastatin      Hypothyroidism  - Continue levothyroxine     Breast cancer status post right mastectomy  - Managed by Dr. Palmer outpatient  - Continue Arimidex     Chronic lymphoid leukemia  - Managed by Dr. Palmer outpatient     Chronic constipation  - Continue polyethylene glycol, metoclopramide and Colace     GERD  - Continue metoclopramide, pantoprazole and carafate     Seasonal allergies  - Continue zyrtec      Depression, chronic  - Continue remeron and zoloft      Overactive bladder  - Continue detrol      Vitamin B 12 deficiency  - Continue vitamin B 12        VTE Prophylaxis -SCDs        Mechanical Order History:     None      Pharmalogical Order History:     Ordered     Dose Route Frequency Stop    02/06/20 1938  heparin (porcine) 5000 UNIT/ML injection 5,000 Units      5,000 Units SC Every 12 Hours Scheduled --            Code Status -   Code Status and Medical Interventions:   Ordered at: 02/06/20 1825     Level Of Support Discussed With:    Patient     Code Status:    CPR     Medical Interventions (Level of Support Prior to Arrest):    Full       Discharge Planning    Home in 1 to 2 days if clinically improved.      Destination      Coordination has not been started for this encounter.      Durable Medical Equipment      Coordination has not been started for this encounter.      Dialysis/Infusion      Coordination has not been started for this encounter.      Home Medical Care      Coordination has not  been started for this encounter.      Therapy      Coordination has not been started for this encounter.      Community Resources      Coordination has not been started for this encounter.            Electronically signed by Violet Fletcher DO, 02/11/20, 3:33 PM.  [facility] Hospitalist Team

## 2020-02-11 NOTE — PROGRESS NOTES
[facility] Medicine Services Daily Progress Note      Hospitalist Team  LOS 3 days      Patient Care Team:  Jemima Fontaine MD as PCP - General (Family Medicine)    Patient Location: 203/1      Subjective   Subjective     Chief Complaint / Subjective  Chief Complaint   Patient presents with   • Cough         Brief Synopsis of Hospital Course/HPI  Patient is a 79-year-old female with a history of breast cancer, CLL, HTN, HLD, type II DM, GERD, depression and is undergoing current treatment for MAC pneumonia who presented to the emergency room complaining of cough and headache that began approximately 1 week ago.  Outpatient treatment with antibiotics and steroids did not help and she came to the emergency room where she was diagnosed with human metapneumovirus infection.  She has had some nausea and vomiting with no hematemesis or coffee-ground emesis.  She complains of acute on chronic constipation as well, and she reports that she normally takes Colace and polyethylene glycol at home. She uses nasal cannula O2 at night at home normally.    Date: 2/7/2020: Patient reports persistent chest congestion and cough with wheezing.  She reports that she vomited after she ate a turkey sandwich last night but she was able to keep down an omelette with schreiber this morning.  2/8/2020: The patient reports continued chest congestion and cough.  She has had recurrent episodes of vomiting of the food that she is eaten soon after she eats.  2/9/2020: The patient reports continued bouts of severe cough.  She also complains of several episodes of vomiting, mostly after she eats, but it is coffee-ground in appearance.  2/10/2020: Patient reports her shortness of breath is worse this am and her oxygen had to be increased.     Review of Systems   Constitution: Negative for chills and fever.   Cardiovascular: Negative for chest pain and palpitations.   Respiratory: Positive for cough and shortness of breath.  "Negative for wheezing.          Objective   Objective      Vital Signs  Temp:  [97.6 °F (36.4 °C)-97.9 °F (36.6 °C)] 97.8 °F (36.6 °C)  Heart Rate:  [71-96] 92  Resp:  [18-19] 18  BP: (154-170)/(76-78) 170/76  Oxygen Therapy  SpO2: 92 %  Pulse Oximetry Type: Continuous  Device (Oxygen Therapy): nasal cannula  Flow (L/min): 5  Oxygen Concentration (%): 28  Flowsheet Rows      First Filed Value   Admission Height  160 cm (63\") Documented at 02/06/2020 1248   Admission Weight  81.5 kg (179 lb 10.8 oz) Documented at 02/06/2020 1248        Intake & Output (last 3 days)       02/08 0701 - 02/09 0700 02/09 0701 - 02/10 0700 02/10 0701 - 02/11 0700    P.O.  125 720    I.V. (mL/kg)   1000 (12.5)    Total Intake(mL/kg)  125 (1.6) 1720 (21.5)    Urine (mL/kg/hr) 1050 (0.5)      Total Output 1050      Net -1050 +125 +1720           Urine Unmeasured Occurrence   1 x        Lines, Drains & Airways    Active LDAs     Name:   Placement date:   Placement time:   Site:   Days:    Peripheral IV 02/06/20 1403 Left Forearm   02/06/20    1403    Forearm   less than 1                  Physical Exam:    Physical Exam   Constitutional: She is oriented to person, place, and time.   Elderly female in mild distress    HENT:   Head: Normocephalic and atraumatic.   Mouth/Throat: Oropharynx is clear and moist.   Eyes: Pupils are equal, round, and reactive to light. No scleral icterus.   Cardiovascular: Normal rate and regular rhythm.   No murmur heard.  Pulmonary/Chest:   Diminished aeration throughout, scattered rhonchi    Abdominal: Soft. Bowel sounds are normal. She exhibits no distension. There is no tenderness.   Neurological: She is alert and oriented to person, place, and time. No cranial nerve deficit.   Skin: Skin is warm and dry. No rash noted.   Psychiatric: She has a normal mood and affect. Her behavior is normal.   Vitals reviewed.        Procedures:              Results Review:     I reviewed the patient's new clinical " results.      Lab Results (last 24 hours)     Procedure Component Value Units Date/Time    POC Glucose Once [[id number]]  (Abnormal) Collected:  02/10/20 2041    Specimen:  Blood Updated:  02/10/20 2049     Glucose 111 mg/dL      Comment: Serial Number: [id number]:  [id number]       POC Glucose Once [[id number]]  (Abnormal) Collected:  02/10/20 1640    Specimen:  Blood Updated:  02/10/20 1643     Glucose 169 mg/dL      Comment: Serial Number: [id number]:  [id number]       Blood Culture - Blood, Arm, Left [[id number]] Collected:  02/06/20 1434    Specimen:  Blood from Arm, Left Updated:  02/10/20 1445     Blood Culture No growth at 4 days    Blood Culture - Blood, Arm, Left [[id number]] Collected:  02/06/20 1405    Specimen:  Blood from Arm, Left Updated:  02/10/20 1415     Blood Culture No growth at 4 days    POC Glucose Once [[id number]]  (Abnormal) Collected:  02/10/20 1127    Specimen:  Blood Updated:  02/10/20 1139     Glucose 155 mg/dL      Comment: Serial Number: [id number]:  [id number]       POC Glucose Once [[id number]]  (Abnormal) Collected:  02/10/20 0704    Specimen:  Blood Updated:  02/10/20 0706     Glucose 121 mg/dL      Comment: Serial Number: [id number]:  [id number]       Potassium [[id number]]  (Normal) Collected:  02/10/20 0408    Specimen:  Blood Updated:  02/10/20 0502     Potassium 4.4 mmol/L     Magnesium [[id number]]  (Normal) Collected:  02/10/20 0408    Specimen:  Blood Updated:  02/10/20 0502     Magnesium 1.6 mg/dL     Scan Slide [[id number]] Collected:  02/09/20 2306    Specimen:  Blood Updated:  02/09/20 2354     Scan Slide --     Comment: See Manual Differential Results       Manual Differential [[id number]]  (Abnormal) Collected:  02/09/20 2306    Specimen:  Blood Updated:  02/09/20 2354     Neutrophil % 31.0 %      Lymphocyte % 68.0 %      Monocyte % 1.0 %      Neutrophils Absolute 6.39 10*3/mm3      Lymphocytes Absolute 14.01 10*3/mm3      Monocytes Absolute 0.21 10*3/mm3       RBC Morphology Normal     WBC Morphology Normal     Platelet Morphology Normal    Narrative:       Reviewed by Pathologist within the past 30 days on 2/7/2020 .      CBC & Differential [[id number]] Collected:  02/09/20 2306    Specimen:  Blood Updated:  02/09/20 2354    Narrative:       The following orders were created for panel order CBC & Differential.  Procedure                               Abnormality         Status                     ---------                               -----------         ------                     CBC Auto Differential[[id number]]        Abnormal            Final result                 Please view results for these tests on the individual orders.    CBC Auto Differential [[id number]]  (Abnormal) Collected:  02/09/20 2306    Specimen:  Blood Updated:  02/09/20 2354     WBC 20.60 10*3/mm3      RBC 3.50 10*6/mm3      Hemoglobin 9.4 g/dL      Hematocrit 29.6 %      MCV 84.7 fL      MCH 26.9 pg      MCHC 31.7 g/dL      RDW 15.9 %      RDW-SD 47.7 fl      MPV 9.4 fL      Platelets 177 10*3/mm3     Magnesium [[id number]]  (Normal) Collected:  02/09/20 2306    Specimen:  Blood Updated:  02/09/20 2337     Magnesium 1.7 mg/dL     Comprehensive Metabolic Panel [[id number]]  (Abnormal) Collected:  02/09/20 2306    Specimen:  Blood Updated:  02/09/20 2337     Glucose 138 mg/dL      BUN 26 mg/dL      Creatinine 0.98 mg/dL      Sodium 142 mmol/L      Potassium 4.8 mmol/L      Chloride 112 mmol/L      CO2 20.0 mmol/L      Calcium 8.9 mg/dL      Total Protein 5.7 g/dL      Albumin 3.60 g/dL      ALT (SGPT) 10 U/L      AST (SGOT) 13 U/L      Alkaline Phosphatase 89 U/L      Total Bilirubin <0.2 mg/dL      eGFR Non African Amer 55 mL/min/1.73      Globulin 2.1 gm/dL      A/G Ratio 1.7 g/dL      BUN/Creatinine Ratio 26.5     Anion Gap 10.0 mmol/L     Narrative:       GFR Normal >60  Chronic Kidney Disease <60  Kidney Failure <15      Phosphorus [[id number]]  (Normal) Collected:  02/09/20 2306    Specimen:   Blood Updated:  02/09/20 2337     Phosphorus 2.6 mg/dL     aPTT [[id number]]  (Abnormal) Collected:  02/09/20 2306    Specimen:  Blood Updated:  02/09/20 2331     PTT 22.1 seconds     Protime-INR [[id number]]  (Normal) Collected:  02/09/20 2306    Specimen:  Blood Updated:  02/09/20 2331     Protime 9.9 Seconds      INR 0.93        No results found for: HGBA1C  Results from last 7 days   Lab Units 02/09/20 2306   INR  0.93           No results found for: LIPASE  Lab Results   Component Value Date    CHOL 374 (H) 01/03/2020    TRIG 311 (H) 01/03/2020    HDL 50 01/03/2020     (H) 01/03/2020       Lab Results   Lab Value Date/Time    FINALDX  02/06/2020 1405     Absolute atypical lymphocytosis.  Anemia.  No blasts identified.  Recommend clinical follow-up and additional studies to include flow cytometry as clinically indicated to exclude CLL/SLL or other lymphoproliferative disorder.      FINALDX  06/25/2019 1136     Absolute atypical lymphocytosis suspicious for CLL/SLL.         Microbiology Results (last 10 days)     Procedure Component Value - Date/Time    Blood Culture - Blood, Arm, Left [[id number]] Collected:  02/06/20 1434    Lab Status:  Preliminary result Specimen:  Blood from Arm, Left Updated:  02/10/20 1445     Blood Culture No growth at 4 days    Respiratory Panel, PCR - Swab, Nasopharynx [[id number]]  (Abnormal) Collected:  02/06/20 1412    Lab Status:  Final result Specimen:  Swab from Nasopharynx Updated:  02/06/20 1611     ADENOVIRUS, PCR Not Detected     Coronavirus 229E Not Detected     Coronavirus HKU1 Not Detected     Coronavirus NL63 Not Detected     Coronavirus OC43 Not Detected     Human Metapneumovirus Detected     Human Rhinovirus/Enterovirus Not Detected     Influenza B PCR Not Detected     Parainfluenza Virus 1 Not Detected     Parainfluenza Virus 2 Not Detected     Parainfluenza Virus 3 Not Detected     Parainfluenza Virus 4 Not Detected     Bordetella pertussis pcr Not Detected      Influenza A H1 2009 PCR Not Detected     Chlamydophila pneumoniae PCR Not Detected     Mycoplasma pneumo by PCR Not Detected     Influenza A PCR Not Detected     Influenza A H3 Not Detected     Influenza A H1 Not Detected     RSV, PCR Not Detected    Blood Culture - Blood, Arm, Left [[id number]] Collected:  02/06/20 1405    Lab Status:  Preliminary result Specimen:  Blood from Arm, Left Updated:  02/10/20 1415     Blood Culture No growth at 4 days          ECG/EMG Results (most recent)     None                    Xr Chest 1 View    Result Date: 2/6/2020  There is no significant change when compared to the prior study. There is no evidence for acute cardiopulmonary process.  Electronically Signed By-Frederick Lucas On:2/6/2020 3:54 PM This report was finalized on [id number] by  Frederick Lucas, .    Xr Chest Pa & Lateral    Result Date: 2/4/2020  1. No acute cardiopulmonary abnormality. 2. Small hiatal hernia.  Electronically Signed By-Antony Lopez On:2/4/2020 9:40 PM This report was finalized on [id number] by  Antony Lopez .          Xrays, labs reviewed personally by physician.    Medication Review:   I have reviewed the patient's current medication list      Scheduled Meds    amLODIPine 10 mg Oral Daily With Lunch   anastrozole 1 mg Oral Daily   azithromycin 250 mg Oral Once per day on Tue Thu Sat   cetirizine 5 mg Oral Daily   docusate sodium 100 mg Oral BID   ethambutol 400 mg Oral Once per day on Mon Wed Fri   ferrous sulfate 324 mg Oral Daily   guaifenesin-dextromethorphan 2 tablet Oral Q12H   insulin lispro 0-24 Units Subcutaneous 4x Daily With Meals & Nightly   ipratropium-albuterol 3 mL Nebulization Q4H - RT   levothyroxine 100 mcg Oral QAM AC   methylPREDNISolone sodium succinate 40 mg Intravenous Q8H   metoclopramide 7.5 mg Oral 4x Daily AC & at Bedtime   mirtazapine 15 mg Oral Nightly   oxybutynin XL 10 mg Oral Daily   pantoprazole 40 mg Oral BID AC   polyethylene glycol 17 g Oral BID    rifAMPin 300 mg Oral Once per day on Mon Wed Fri   rosuvastatin 10 mg Oral Nightly   sertraline 100 mg Oral Nightly   sodium chloride 10 mL Intravenous Q12H   sucralfate 1 g Oral 4x Daily AC & at Bedtime   vitamin B-12 1,000 mcg Oral Daily       Meds Infusions    sodium chloride 75 mL/hr Last Rate: 75 mL/hr (02/10/20 1932)       Meds PRN  •  acetaminophen **OR** acetaminophen **OR** acetaminophen  •  benzonatate  •  dextrose  •  dextrose  •  diphenhydrAMINE  •  glucagon (human recombinant)  •  hydrALAZINE  •  HYDROcod Polst-CPM Polst ER  •  influenza vaccine  •  insulin lispro **AND** insulin lispro  •  magnesium sulfate **OR** magnesium sulfate in D5W 1g/100mL (PREMIX)  •  ondansetron **OR** ondansetron  •  potassium chloride **OR** potassium chloride **OR** potassium chloride  •  potassium phosphate infusion greater than 15 mMoles **OR** potassium phosphate infusion greater than 15 mMoles **OR** potassium phosphate **OR** sodium phosphate IVPB **OR** sodium phosphate IVPB **OR** sodium phosphate IVPB  •  sodium chloride        Assessment/Plan   Assessment/Plan     Active Hospital Problems    Diagnosis  POA   • **Acute bronchitis due to human metapneumovirus [J20.8]  Yes   • Constipation [K59.00]  Yes   • Overactive bladder [N32.81]  Yes   • Vitamin B 12 deficiency [E53.8]  Yes   • DM hyperosmolarity type II (CMS/HCC) [E11.00]  Yes   • Seasonal allergies [J30.2]  Yes   • Depression [F32.9]  Yes   • Essential hypertension [I10]  Yes   • Hyperlipidemia [E78.5]  Yes   • Mycobacterium avium complex (CMS/HCC) [A31.0]  Yes   • Breast cancer (CMS/HCC) [C50.919]  Yes   • Chronic lymphoid leukemia (CMS/HCC) [C91.10]  Yes   • Hypothyroidism [E03.9]  Yes      Resolved Hospital Problems   No resolved problems to display.       MEDICAL DECISION MAKING COMPLEXITY BY PROBLEM:     Acute bronchitis with reactive airway disease secondary to human metapneumovirus upper respiratory infection associated high-grade fever, nausea and  vomiting.   - wean steroids   - muvinex DM  -Tessalon Perles and Tussionex and nebulizer treatments every 4 hours     Leukocytosis secondary to above   -Monitor  - check procal     Nausea and vomiting with coffee-ground emesis  - Discontinue prophylactic subcu heparin  - Monitor hemoglobin and check coags  - Continue sucralfate   - increase metoclopramide  - Increase PPI to twice daily  - Antiemetics and supportive care  - May need GI consult if vomiting persists or hemoglobin drops    Acute kidney injury- resolved   -  CR 1.2, monitor  - Baseline CR 0.9   - Avoid nephrotoxic medications  - Gentle IV hydration ordered      Acute on chronic headache  -Patient states she gets headaches a lot at home and currently has a headache in which she believes is due to her coughing  -Resolved with IV Compazine and IV Benadryl     Essential hypertension , chronic  - Uncontrolled blood pressure 169/79   - Monitor blood pressure   -Continue amlodipine, holding home losartan due to LEXUS  -Continue IV hydralazine PRN      MAC  -Managed by Dr. Frankel outpatient-patient recently told she just needs to complete the rest of her medications that she has at home and she will be completed with her MAC treatment  -Patient stated she recently did a sleep study   -Patient wears 2 L nasal cannula at night  -Continue azithromycin, ethambutol, rifampin      Diabetes mellitus type 2  - Accuchecks ACHS  - Holding home metformin  - Hemoglobin A1C 5.9   - SSI     Hyperlipidemia  - Continue rosuvastatin      Hypothyroidism  - Continue levothyroxine     Breast cancer status post right mastectomy  - Managed by Dr. Palmer outpatient  - Continue Arimidex     Chronic lymphoid leukemia  - Managed by Dr. Palmer outpatient     Chronic constipation  - Continue polyethylene glycol, metoclopramide and Colace     GERD  - Continue metoclopramide, pantoprazole and carafate     Seasonal allergies  - Continue zyrtec      Depression, chronic  - Continue remeron and  zoloft      Overactive bladder  - Continue detrol      Vitamin B 12 deficiency  - Continue vitamin B 12        VTE Prophylaxis -SCDs        Mechanical Order History:     None      Pharmalogical Order History:     Ordered     Dose Route Frequency Stop    02/06/20 1938  heparin (porcine) 5000 UNIT/ML injection 5,000 Units      5,000 Units SC Every 12 Hours Scheduled --            Code Status -   Code Status and Medical Interventions:   Ordered at: 02/06/20 1825     Level Of Support Discussed With:    Patient     Code Status:    CPR     Medical Interventions (Level of Support Prior to Arrest):    Full       Discharge Planning    Home in 1 to 2 days if clinically improved.      Destination      Coordination has not been started for this encounter.      Durable Medical Equipment      Coordination has not been started for this encounter.      Dialysis/Infusion      Coordination has not been started for this encounter.      Home Medical Care      Coordination has not been started for this encounter.      Therapy      Coordination has not been started for this encounter.      Community Resources      Coordination has not been started for this encounter.            Electronically signed by Violet Fletcher DO, 02/10/20, 9:06 PM.  [facility]ist Team

## 2020-02-11 NOTE — PLAN OF CARE
Patient feeling the same. Not c/o any pain. Has been on 2.5L oxygen today. Pulmonology consulted. Will continue to monitor.

## 2020-02-11 NOTE — PLAN OF CARE
Patient resting abed, denies pain or discomfort. Patient does have infrequent cough, she has taken PRN medication and it has helped. Patient is currently on 5L O2 which is not what she is on at home.

## 2020-02-12 ENCOUNTER — APPOINTMENT (OUTPATIENT)
Dept: CT IMAGING | Facility: HOSPITAL | Age: 80
End: 2020-02-12

## 2020-02-12 VITALS
WEIGHT: 183 LBS | BODY MASS INDEX: 32.43 KG/M2 | TEMPERATURE: 97.9 F | OXYGEN SATURATION: 92 % | HEIGHT: 63 IN | DIASTOLIC BLOOD PRESSURE: 72 MMHG | SYSTOLIC BLOOD PRESSURE: 152 MMHG | RESPIRATION RATE: 18 BRPM | HEART RATE: 84 BPM

## 2020-02-12 LAB
ANION GAP SERPL CALCULATED.3IONS-SCNC: 12 MMOL/L (ref 5–15)
BUN BLD-MCNC: 22 MG/DL (ref 8–23)
BUN/CREAT SERPL: 22.9 (ref 7–25)
CALCIUM SPEC-SCNC: 8.9 MG/DL (ref 8.6–10.5)
CHLORIDE SERPL-SCNC: 108 MMOL/L (ref 98–107)
CO2 SERPL-SCNC: 21 MMOL/L (ref 22–29)
CREAT BLD-MCNC: 0.96 MG/DL (ref 0.57–1)
DEPRECATED RDW RBC AUTO: 47.7 FL (ref 37–54)
ERYTHROCYTE [DISTWIDTH] IN BLOOD BY AUTOMATED COUNT: 16.1 % (ref 12.3–15.4)
GFR SERPL CREATININE-BSD FRML MDRD: 56 ML/MIN/1.73
GLUCOSE BLD-MCNC: 108 MG/DL (ref 65–99)
GLUCOSE BLDC GLUCOMTR-MCNC: 188 MG/DL (ref 70–105)
GLUCOSE BLDC GLUCOMTR-MCNC: 97 MG/DL (ref 70–105)
GLUCOSE BLDC GLUCOMTR-MCNC: 98 MG/DL (ref 70–105)
HCT VFR BLD AUTO: 27.6 % (ref 34–46.6)
HGB BLD-MCNC: 8.6 G/DL (ref 12–15.9)
LYMPHOCYTES # BLD MANUAL: 24.87 10*3/MM3 (ref 0.7–3.1)
LYMPHOCYTES NFR BLD MANUAL: 1 % (ref 5–12)
LYMPHOCYTES NFR BLD MANUAL: 81 % (ref 19.6–45.3)
MAGNESIUM SERPL-MCNC: 1.5 MG/DL (ref 1.6–2.4)
MCH RBC QN AUTO: 26.4 PG (ref 26.6–33)
MCHC RBC AUTO-ENTMCNC: 31.2 G/DL (ref 31.5–35.7)
MCV RBC AUTO: 84.6 FL (ref 79–97)
MONOCYTES # BLD AUTO: 0.31 10*3/MM3 (ref 0.1–0.9)
NEUTROPHILS # BLD AUTO: 5.53 10*3/MM3 (ref 1.7–7)
NEUTROPHILS NFR BLD MANUAL: 18 % (ref 42.7–76)
PLAT MORPH BLD: NORMAL
PLATELET # BLD AUTO: 203 10*3/MM3 (ref 140–450)
PMV BLD AUTO: 9.1 FL (ref 6–12)
POTASSIUM BLD-SCNC: 4.5 MMOL/L (ref 3.5–5.2)
RBC # BLD AUTO: 3.26 10*6/MM3 (ref 3.77–5.28)
RBC MORPH BLD: NORMAL
SCAN SLIDE: NORMAL
SODIUM BLD-SCNC: 141 MMOL/L (ref 136–145)
WBC MORPH BLD: NORMAL
WBC NRBC COR # BLD: 30.7 10*3/MM3 (ref 3.4–10.8)

## 2020-02-12 PROCEDURE — 97110 THERAPEUTIC EXERCISES: CPT

## 2020-02-12 PROCEDURE — 36415 COLL VENOUS BLD VENIPUNCTURE: CPT | Performed by: INTERNAL MEDICINE

## 2020-02-12 PROCEDURE — 94799 UNLISTED PULMONARY SVC/PX: CPT

## 2020-02-12 PROCEDURE — 63710000001 INSULIN LISPRO (HUMAN) PER 5 UNITS: Performed by: HOSPITALIST

## 2020-02-12 PROCEDURE — 85007 BL SMEAR W/DIFF WBC COUNT: CPT | Performed by: INTERNAL MEDICINE

## 2020-02-12 PROCEDURE — 83735 ASSAY OF MAGNESIUM: CPT | Performed by: INTERNAL MEDICINE

## 2020-02-12 PROCEDURE — 85025 COMPLETE CBC W/AUTO DIFF WBC: CPT | Performed by: INTERNAL MEDICINE

## 2020-02-12 PROCEDURE — 83540 ASSAY OF IRON: CPT | Performed by: FAMILY MEDICINE

## 2020-02-12 PROCEDURE — 82962 GLUCOSE BLOOD TEST: CPT

## 2020-02-12 PROCEDURE — 99239 HOSP IP/OBS DSCHRG MGMT >30: CPT | Performed by: INTERNAL MEDICINE

## 2020-02-12 PROCEDURE — 25010000002 METHYLPREDNISOLONE PER 40 MG: Performed by: INTERNAL MEDICINE

## 2020-02-12 PROCEDURE — 82728 ASSAY OF FERRITIN: CPT | Performed by: FAMILY MEDICINE

## 2020-02-12 PROCEDURE — 80048 BASIC METABOLIC PNL TOTAL CA: CPT | Performed by: INTERNAL MEDICINE

## 2020-02-12 PROCEDURE — 84466 ASSAY OF TRANSFERRIN: CPT | Performed by: FAMILY MEDICINE

## 2020-02-12 PROCEDURE — 71250 CT THORAX DX C-: CPT

## 2020-02-12 PROCEDURE — 97116 GAIT TRAINING THERAPY: CPT

## 2020-02-12 RX ORDER — PREDNISONE 10 MG/1
TABLET ORAL
Qty: 20 TABLET | Refills: 0 | Status: SHIPPED | OUTPATIENT
Start: 2020-02-12 | End: 2020-02-20

## 2020-02-12 RX ORDER — IPRATROPIUM BROMIDE AND ALBUTEROL SULFATE 2.5; .5 MG/3ML; MG/3ML
3 SOLUTION RESPIRATORY (INHALATION)
Qty: 360 ML | Refills: 0 | Status: SHIPPED | OUTPATIENT
Start: 2020-02-12 | End: 2021-07-02

## 2020-02-12 RX ADMIN — ANASTROZOLE 1 MG: 1 TABLET ORAL at 08:32

## 2020-02-12 RX ADMIN — PANTOPRAZOLE SODIUM 40 MG: 40 TABLET, DELAYED RELEASE ORAL at 17:00

## 2020-02-12 RX ADMIN — OXYBUTYNIN CHLORIDE 10 MG: 10 TABLET, EXTENDED RELEASE ORAL at 08:32

## 2020-02-12 RX ADMIN — IPRATROPIUM BROMIDE AND ALBUTEROL SULFATE 3 ML: .5; 3 SOLUTION RESPIRATORY (INHALATION) at 02:53

## 2020-02-12 RX ADMIN — METOCLOPRAMIDE 7.5 MG: 5 TABLET ORAL at 17:00

## 2020-02-12 RX ADMIN — LEVOTHYROXINE SODIUM 100 MCG: 100 TABLET ORAL at 08:32

## 2020-02-12 RX ADMIN — CETIRIZINE HYDROCHLORIDE 5 MG: 10 TABLET, FILM COATED ORAL at 08:31

## 2020-02-12 RX ADMIN — ETHAMBUTOL HYDROCHLORIDE 400 MG: 400 TABLET, FILM COATED ORAL at 08:32

## 2020-02-12 RX ADMIN — POLYETHYLENE GLYCOL 3350 17 G: 17 POWDER, FOR SOLUTION ORAL at 08:31

## 2020-02-12 RX ADMIN — METOCLOPRAMIDE 7.5 MG: 5 TABLET ORAL at 12:42

## 2020-02-12 RX ADMIN — SUCRALFATE 1 G: 1 TABLET ORAL at 12:42

## 2020-02-12 RX ADMIN — Medication 10 ML: at 10:14

## 2020-02-12 RX ADMIN — IPRATROPIUM BROMIDE AND ALBUTEROL SULFATE 3 ML: .5; 3 SOLUTION RESPIRATORY (INHALATION) at 10:16

## 2020-02-12 RX ADMIN — CYANOCOBALAMIN TAB 1000 MCG 1000 MCG: 1000 TAB at 08:32

## 2020-02-12 RX ADMIN — RIFAMPIN 300 MG: 300 CAPSULE ORAL at 08:31

## 2020-02-12 RX ADMIN — METOCLOPRAMIDE 7.5 MG: 5 TABLET ORAL at 08:32

## 2020-02-12 RX ADMIN — PANTOPRAZOLE SODIUM 40 MG: 40 TABLET, DELAYED RELEASE ORAL at 08:32

## 2020-02-12 RX ADMIN — SUCRALFATE 1 G: 1 TABLET ORAL at 08:31

## 2020-02-12 RX ADMIN — SUCRALFATE 1 G: 1 TABLET ORAL at 17:00

## 2020-02-12 RX ADMIN — FERROUS SULFATE TAB EC 324 MG (65 MG FE EQUIVALENT) 324 MG: 324 (65 FE) TABLET DELAYED RESPONSE at 08:32

## 2020-02-12 RX ADMIN — IPRATROPIUM BROMIDE AND ALBUTEROL SULFATE 3 ML: .5; 3 SOLUTION RESPIRATORY (INHALATION) at 06:30

## 2020-02-12 RX ADMIN — GUAIFENESIN AND DEXTROMETHORPHAN HYDROBROMIDE 2 TABLET: 600; 30 TABLET, EXTENDED RELEASE ORAL at 08:33

## 2020-02-12 RX ADMIN — LOSARTAN POTASSIUM 100 MG: 50 TABLET, FILM COATED ORAL at 08:32

## 2020-02-12 RX ADMIN — IPRATROPIUM BROMIDE AND ALBUTEROL SULFATE 3 ML: .5; 3 SOLUTION RESPIRATORY (INHALATION) at 15:21

## 2020-02-12 RX ADMIN — DOCUSATE SODIUM 100 MG: 100 CAPSULE, LIQUID FILLED ORAL at 08:31

## 2020-02-12 RX ADMIN — METHYLPREDNISOLONE SODIUM SUCCINATE 40 MG: 40 INJECTION, POWDER, FOR SOLUTION INTRAMUSCULAR; INTRAVENOUS at 08:31

## 2020-02-12 RX ADMIN — INSULIN LISPRO 4 UNITS: 100 INJECTION, SOLUTION INTRAVENOUS; SUBCUTANEOUS at 12:44

## 2020-02-12 RX ADMIN — AMLODIPINE BESYLATE 10 MG: 5 TABLET ORAL at 12:42

## 2020-02-12 NOTE — PLAN OF CARE
Problem: Patient Care Overview  Goal: Plan of Care Review  Outcome: Ongoing (interventions implemented as appropriate)  Flowsheets  Taken 2/12/2020 2080  Progress: improving  Plan of Care Reviewed With: patient  Taken 2/12/2020 1000  Outcome Summary: Requires sup/cga for safe, functional mobility. Low activity tolerance, decreased righting recovery. Pt. reports having rwx. at home, presents safe for home c caregiver and HH at d/c to address deficits.

## 2020-02-12 NOTE — THERAPY TREATMENT NOTE
Patient Name: Diane Guan  : 1940    MRN: [id number]                              Today's Date: 2020       Admit Date: 2020    Visit Dx:     ICD-10-CM ICD-9-CM   1. Acute bronchiolitis due to human metapneumovirus J21.1 466.19   2. Hypothyroidism, unspecified type E03.9 244.9   3. Hypoxemia R09.02 799.02   4. Seasonal allergies J30.2 477.9     Patient Active Problem List   Diagnosis   • Abnormal mammogram   • Abnormality of gait   • Anxiety disorder   • Arthralgia   • Arthritis   • Breast cancer (CMS/HCC)   • Chronic lymphoid leukemia (CMS/HCC)   • Depression   • Gallbladder disorder   • Gastroesophageal reflux disease with hiatal hernia   • General medical exam   • Hay fever   • Hematochezia   • Hyperlipidemia   • Essential hypertension   • Hypothyroidism   • Impaired glucose tolerance   • Increased frequency of urination   • Insomnia   • Leukocytosis   • Mycobacterium avium complex (CMS/HCC)   • Postmenopausal status   • Shoulder pain   • Dependence on supplemental oxygen   • Constipation   • Overactive bladder   • Vitamin B 12 deficiency   • DM hyperosmolarity type II (CMS/HCC)   • Seasonal allergies   • Acute bronchitis due to human metapneumovirus     Past Medical History:   Diagnosis Date   • Acid reflux    • Anxiety disorder    • Arthritis    • Breast cancer (CMS/HCC)    • Depression    • H/O mammogram 2017   • Hemorrhoid    • Hyperlipidemia    • Hypertension    • TB (tuberculosis)     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    • MASTECTOMY Right    • TUBAL ABDOMINAL LIGATION       General Information     Row Name 20 1547          PT Evaluation  Time/Intention    Document Type  therapy note (daily note)  -     Mode of Treatment  physical therapy  -[facility] Name 02/12/20 1547          Cognitive Assessment/Intervention- PT/OT    Orientation Status (Cognition)  oriented x 3  -[facility] Name 02/12/20 1547          Safety Issues, Functional Mobility    Impairments Affecting Function (Mobility)  shortness of breath;strength;endurance/activity tolerance  -       User Key  (r) = Recorded By, (t) = Taken By, (c) = Cosigned By    Initials Name Provider Type     Jess Marion PTA Physical Therapy Assistant        Mobility     Row Name 02/12/20 1548          Bed Mobility Assessment/Treatment    Bed Mobility Assessment/Treatment  bed mobility (all) activities  -     [place]o Level (Bed Mobility)  conditional independence  -     Assistive Device (Bed Mobility)  bed rails;head of bed elevated  -[facility] Name 02/12/20 1548          Sit-Stand Transfer    Sit-Stand [place] (Transfers)  verbal cues;conditional independence  -     Assistive Device (Sit-Stand Transfers)  walker, front-wheeled  -[facility] Name 02/12/20 1548          Gait/Stairs Assessment/Training    Gait/Stairs Assessment/Training  gait/ambulation assistive device  -     [place] Level (Gait)  supervision;verbal cues  -     Assistive Device (Gait)  walker, front-wheeled  -     Distance in Feet (Gait)  50' x 2  -     Pattern (Gait)  step-through  -     Deviations/Abnormal Patterns (Gait)  gait speed decreased;stride length decreased Vcs for eyes up.  -       User Key  (r) = Recorded By, (t) = Taken By, (c) = Cosigned By    Initials Name Provider Type     Jess Marion PTA Physical Therapy Assistant        Obj/Interventions     [facility] Name 02/12/20 1549          Therapeutic Exercise    Position (Therapeutic Exercise)  seated  -     Comment (Therapeutic Exercise)  Seated exercise in available planes.  -[facility] Name 02/12/20 1549          Static Sitting Balance     Level of [place] (Unsupported Sitting, Static Balance)  independent  -[facility] Name 02/12/20 1549          Dynamic Sitting Balance    Level of [place], Reaches Outside Midline (Sitting, Dynamic Balance)  supervision  -     Sitting Position, Reaches Outside Midline (Sitting, Dynamic Balance)  sitting on edge of bed  -     Comment, Reaches Outside Midline (Sitting, Dynamic Balance)  Lmited away from midline.  -[facility] Name 02/12/20 1549          Static Standing Balance    Level of [place] (Supported Standing, Static Balance)  conditional independence;1 person assist  -     Assistive Device Utilized (Supported Standing, Static Balance)  walker, rolling  -[facility] Name 02/12/20 1549          Dynamic Standing Balance    Level of [place], Reaches Outside Midline (Standing, Dynamic Balance)  contact guard assist  -     Assistive Device Utilized (Supported Standing, Dynamic Balance)  walker, rolling  -     Comment, Reaches Outside Midline (Standing, Dynamic Balance)  -- Limited away from midline.  -       User Key  (r) = Recorded By, (t) = Taken By, (c) = Cosigned By    Initials Name Provider Type     Jess Marion PTA Physical Therapy Assistant        Goals/Plan    No documentation.       Clinical Impression     [facility] Name 02/12/20 3617          Pain Scale: Numbers Pre/Post-Treatment    Pain Scale: Numbers, Pretreatment  0/10 - no pain  -     Pain Scale: Numbers, Post-Treatment  0/10 - no pain  -[facility] Name 02/12/20 5222          Plan of Care Review    Progress  improving  -     Outcome Summary  Requires sup/cga for safe, functional mobility. Low activity tolerance, decreased righting recovery. Pt. reports having rwx. at home, presents safe for home c caregiver and HH at d/c to address deficits.  -     Row Name 02/12/20 4968          Vital Signs    O2 Delivery Pre Treatment  supplemental O2  -     O2 Delivery Intra Treatment  supplemental O2  -     O2 Delivery Post  Treatment  supplemental O2  -LH     Pre Patient Position  Supine  -LH     Intra Patient Position  Standing  -LH     Post Patient Position  Sitting  -     Row Name 02/12/20 1550          Positioning and Restraints    Pre-Treatment Position  in bed  -LH     Post Treatment Position  bed  -LH     In Bed  notified nsg;supine;fowlers;encouraged to call for assist  -       User Key  (r) = Recorded By, (t) = Taken By, (c) = Cosigned By    Initials Name Provider Type     Jess Marion PTA Physical Therapy Assistant        Outcome Measures    No documentation.         PT Recommendation and Plan     Outcome Summary/Treatment Plan (PT)  Anticipated Discharge Disposition (PT): home with home health, home with assist  Plan of Care Reviewed With: patient  Progress: improving  Outcome Summary: Requires sup/cga for safe, functional mobility. Low activity tolerance, decreased righting recovery. Pt. reports having rwx. at home, presents safe for home c caregiver and HH at d/c to address deficits.     Time Calculation:   PT Charges     Row Name 02/12/20 1556             Time Calculation    Start Time  0322  -      Stop Time  0340  -      Time Calculation (min)  18 min  -      PT Received On  02/12/20  -      PT - Next Appointment  02/14/20  -         Time Calculation- PT    Total Timed Code Minutes- PT  18 minute(s)  -         Timed Charges    23557 - PT Therapeutic Exercise Minutes  8  -      03110 - Gait Training Minutes   10  -        User Key  (r) = Recorded By, (t) = Taken By, (c) = Cosigned By    Initials Name Provider Type     Jess Marion PTA Physical Therapy Assistant        Therapy Charges for Today     Code Description Service Date Service Provider Modifiers Qty    73611165382 HC GAIT TRAINING EA 15 MIN 2/12/2020 Jess Marion PTA GP 1    02080812087 HC PT THER PROC EA 15 MIN 2/12/2020 Jess Marion PTA GP 1               Jess Marion PTA  2/12/2020

## 2020-02-12 NOTE — PLAN OF CARE
Problem: Patient Care Overview  Goal: Plan of Care Review  Outcome: Ongoing (interventions implemented as appropriate)  Flowsheets (Taken 2/12/2020 0215)  Outcome Summary: Pt resting well with no complaints at this time. pt on 02 @ 2.5 NC and does not appear to be in any distress. Family member at bedside. CT of chest in AM. will continue to monitor.

## 2020-02-12 NOTE — DISCHARGE SUMMARY
Date of Admission: 2/6/2020    Date of Discharge:  2/12/2020    Length of stay:  LOS: 5 days     Admission Diagnosis:   Hypoxemia [R09.02]  Acute bronchiolitis due to human metapneumovirus [J21.1]  Hypothyroidism, unspecified type [E03.9]  Acute bronchiolitis due to human metapneumovirus [J21.1]      Discharge Diagnosis:     Acute bronchitis with reactive airway disease secondary to human metapneumovirus upper respiratory infection associated high-grade fever, nausea and vomiting.   - wean steroids   - mucinex DM  - Tussionex and nebulizer treatments every 4 hours   - CT chest reviewed  - nebulizer arranged  - f/u with pcp and Dr. Frankel     Leukocytosis likely d/t CLL   - procal negative   - afebrile     Nausea and vomiting with coffee-ground emesis- resolved   - Discontinue prophylactic subcu heparin  - continue ppi, carafate and reglan  - Hgb stabilized     Acute renal insufficiency - resolve  - Baseline CR 0.9      Acute on chronic headache- resolved    Essential hypertension , chronic  -Continue amlodipine and losartan       MYCOBACTERIUM AVIUM COMPLEX   - Managed by Dr. Frankel outpatient-patient recently told she just needs to complete the rest of her medications that she has at home and she will be completed with her MAC treatment  - Patient stated she recently did a sleep study   - Patient wears 2 L nasal cannula at night  - Continue azithromycin, ethambutol, rifampin   - f/u with Dr. Frankel      Diabetes mellitus type 2  - resume home meds   - Hemoglobin A1C 5.9      Hyperlipidemia  - Continue rosuvastatin      Hypothyroidism  - Continue levothyroxine     Breast cancer status post right mastectomy  - Managed by Dr. Palmer outpatient  - Continue Arimidex     Chronic lymphoid leukemia  - Managed by Dr. Palmer outpatient     Chronic constipation  - Continue polyethylene glycol, metoclopramide and Colace     GERD  - Continue metoclopramide, pantoprazole and carafate     Seasonal allergies  - Continue zyrtec       Depression, chronic  - Continue remeron and zoloft      Overactive bladder  - Continue detrol      Vitamin B 12 deficiency  - Continue vitamin B 12        Active Hospital Problems    Diagnosis  POA   • **Acute bronchitis due to human metapneumovirus [J20.8]  Yes   • Constipation [K59.00]  Yes   • Overactive bladder [N32.81]  Yes   • Vitamin B 12 deficiency [E53.8]  Yes   • DM hyperosmolarity type II (CMS/HCC) [E11.00]  Yes   • Seasonal allergies [J30.2]  Yes   • Depression [F32.9]  Yes   • Essential hypertension [I10]  Yes   • Hyperlipidemia [E78.5]  Yes   • Mycobacterium avium complex (CMS/HCC) [A31.0]  Yes   • Breast cancer (CMS/HCC) [C50.919]  Yes   • Chronic lymphoid leukemia (CMS/HCC) [C91.10]  Yes   • Hypothyroidism [E03.9]  Yes      Resolved Hospital Problems   No resolved problems to display.       Hospital Course:  Patient is a 79 y.o. female with a history of breast cancer, CLL, HTN, HLD, type II DM, GERD, depression and is undergoing current treatment for MAC pneumonia who presented to the emergency room complaining of cough and headache that began approximately 1 week ago.  Outpatient treatment with antibiotics and steroids did not help and she came to the emergency room where she was diagnosed with human metapneumovirus infection.  She has had some nausea and vomiting with no hematemesis or coffee-ground emesis.  She complains of acute on chronic constipation as well, and she reports that she normally takes Colace and polyethylene glycol at home. She uses nasal cannula O2 at night at home normally.     Date: 2/7/2020: Patient reports persistent chest congestion and cough with wheezing.  She reports that she vomited after she ate a turkey sandwich last night but she was able to keep down an omelette with schreiber this morning.  2/8/2020: The patient reports continued chest congestion and cough.  She has had recurrent episodes of vomiting of the food that she is eaten soon after she eats.  2/9/2020: The  patient reports continued bouts of severe cough.  She also complains of several episodes of vomiting, mostly after she eats, but it is coffee-ground in appearance.  2/10/2020: Patient reports her shortness of breath is worse this am and her oxygen had to be increased.   2/11/2020- pt still complaining of severe shortness of breath, seems out of proportion to condition.   2/12/2020- pt reports she is feeling better today. She finally coughed up mucus over night and now feels better. Discussed with Dr. Frankel and since patient is improving ok for discharge home.       Procedures Performed:         Consults:   Consults     Date and Time Order Name Status Description    2/11/2020 1040 Inpatient Pulmonology Consult            Vital Signs:  Temp:  [97.5 °F (36.4 °C)-97.9 °F (36.6 °C)] 97.9 °F (36.6 °C)  Heart Rate:  [72-94] 84  Resp:  [18-20] 18  BP: (151-183)/(70-78) 152/72        Physical Exam:  Physical Exam   Constitutional: She appears well-developed. No distress.   obese   HENT:   Head: Normocephalic and atraumatic.   Mouth/Throat: Oropharynx is clear and moist.   Cardiovascular: Normal rate and regular rhythm.   Pulmonary/Chest: Effort normal. No stridor. No respiratory distress.   Diminished in the bases    Abdominal: Soft. Bowel sounds are normal.   Skin: Skin is warm and dry.   Psychiatric: She has a normal mood and affect. Her behavior is normal.   Vitals reviewed.          Pertinent Test Results:   Lab Results (last 72 hours)     Procedure Component Value Units Date/Time    POC Glucose Once [[id number]]  (Normal) Collected:  02/12/20 1633    Specimen:  Blood Updated:  02/12/20 1635     Glucose 98 mg/dL      Comment: Serial Number: [id number]:  [id number]       POC Glucose Once [[id number]]  (Abnormal) Collected:  02/12/20 1115    Specimen:  Blood Updated:  02/12/20 1119     Glucose 188 mg/dL      Comment: Serial Number: [id number]:  [id number]       Magnesium [[id number]]  (Abnormal) Collected:   02/12/20 0616    Specimen:  Blood Updated:  02/12/20 0823     Magnesium 1.5 mg/dL     Basic Metabolic Panel [[id number]]  (Abnormal) Collected:  02/12/20 0616    Specimen:  Blood Updated:  02/12/20 0756     Glucose 108 mg/dL      BUN 22 mg/dL      Creatinine 0.96 mg/dL      Sodium 141 mmol/L      Potassium 4.5 mmol/L      Chloride 108 mmol/L      CO2 21.0 mmol/L      Calcium 8.9 mg/dL      eGFR Non African Amer 56 mL/min/1.73      BUN/Creatinine Ratio 22.9     Anion Gap 12.0 mmol/L     Narrative:       GFR Normal >60  Chronic Kidney Disease <60  Kidney Failure <15      Scan Slide [[id number]] Collected:  02/12/20 0617    Specimen:  Blood Updated:  02/12/20 0724     Scan Slide --     Comment: See Manual Differential Results       Manual Differential [[id number]]  (Abnormal) Collected:  02/12/20 0617    Specimen:  Blood Updated:  02/12/20 0724     Neutrophil % 18.0 %      Lymphocyte % 81.0 %      Monocyte % 1.0 %      Neutrophils Absolute 5.53 10*3/mm3      Lymphocytes Absolute 24.87 10*3/mm3      Monocytes Absolute 0.31 10*3/mm3      RBC Morphology Normal     WBC Morphology Normal     Platelet Morphology Normal    Narrative:       Reviewed by Pathologist within the past 30 days on [id number] .      CBC & Differential [[id number]] Collected:  02/12/20 0617    Specimen:  Blood Updated:  02/12/20 0724    Narrative:       The following orders were created for panel order CBC & Differential.  Procedure                               Abnormality         Status                     ---------                               -----------         ------                     CBC Auto Differential[[id number]]        Abnormal            Final result                 Please view results for these tests on the individual orders.    CBC Auto Differential [[id number]]  (Abnormal) Collected:  02/12/20 0617    Specimen:  Blood Updated:  02/12/20 0724     WBC 30.70 10*3/mm3      RBC 3.26 10*6/mm3      Hemoglobin 8.6 g/dL      Hematocrit 27.6 %       MCV 84.6 fL      MCH 26.4 pg      MCHC 31.2 g/dL      RDW 16.1 %      RDW-SD 47.7 fl      MPV 9.1 fL      Platelets 203 10*3/mm3     POC Glucose Once [[id number]]  (Normal) Collected:  02/12/20 0655    Specimen:  Blood Updated:  02/12/20 0657     Glucose 97 mg/dL      Comment: Serial Number: [id number]:  [id number]       POC Glucose Once [[id number]]  (Abnormal) Collected:  02/11/20 2023    Specimen:  Blood Updated:  02/11/20 2024     Glucose 180 mg/dL      Comment: Serial Number: [id number]:  [id number]       POC Glucose Once [[id number]]  (Abnormal) Collected:  02/11/20 1639    Specimen:  Blood Updated:  02/11/20 1643     Glucose 110 mg/dL      Comment: Serial Number: [id number]:  [id number]       Blood Culture - Blood, Arm, Left [[id number]] Collected:  02/06/20 1434    Specimen:  Blood from Arm, Left Updated:  02/11/20 1445     Blood Culture No growth at 5 days    Blood Culture - Blood, Arm, Left [[id number]] Collected:  02/06/20 1405    Specimen:  Blood from Arm, Left Updated:  02/11/20 1415     Blood Culture No growth at 5 days    POC Glucose Once [[id number]]  (Abnormal) Collected:  02/11/20 1149    Specimen:  Blood Updated:  02/11/20 1205     Glucose 147 mg/dL      Comment: Serial Number: [id number]:  [id number]       POC Glucose Once [[id number]]  (Normal) Collected:  02/11/20 0715    Specimen:  Blood Updated:  02/11/20 0718     Glucose 100 mg/dL      Comment: Serial Number: [id number]:  [id number]       CBC & Differential [[id number]] Collected:  02/11/20 0207    Specimen:  Blood Updated:  02/11/20 0613    Narrative:       The following orders were created for panel order CBC & Differential.  Procedure                               Abnormality         Status                     ---------                               -----------         ------                     CBC Auto Differential[[id number]]        Abnormal            Final result                 Please view results for these  "tests on the individual orders.    CBC Auto Differential [[id number]]  (Abnormal) Collected:  02/11/20 0207    Specimen:  Blood Updated:  02/11/20 0613     WBC 20.00 10*3/mm3      RBC 3.19 10*6/mm3      Hemoglobin 8.5 g/dL      Hematocrit 26.9 %      MCV 84.4 fL      MCH 26.6 pg      MCHC 31.5 g/dL      RDW 16.0 %      RDW-SD 47.7 fl      MPV 10.1 fL      Platelets 166 10*3/mm3     Scan Slide [[id number]] Collected:  02/11/20 0207    Specimen:  Blood Updated:  02/11/20 0613     Scan Slide --     Comment: See Manual Differential Results       Manual Differential [[id number]]  (Abnormal) Collected:  02/11/20 0207    Specimen:  Blood Updated:  02/11/20 0613     Neutrophil % 35.0 %      Lymphocyte % 64.0 %      Monocyte % 1.0 %      Neutrophils Absolute 7.00 10*3/mm3      Lymphocytes Absolute 12.80 10*3/mm3      Monocytes Absolute 0.20 10*3/mm3      RBC Morphology Normal     WBC Morphology Normal     Platelet Morphology Normal    Narrative:       Reviewed by Pathologist within the past 30 days on 2/7/2020 .      Procalcitonin [[id number]]  (Abnormal) Collected:  02/11/20 0207    Specimen:  Blood Updated:  02/11/20 0443     Procalcitonin 0.05 ng/mL     Narrative:       As a Marker for Sepsis (Non-Neonates):   1. <0.5 ng/mL represents a low risk of severe sepsis and/or septic shock.  1. >2 ng/mL represents a high risk of severe sepsis and/or septic shock.    As a Marker for Lower Respiratory Tract Infections that require antibiotic therapy:  PCT on Admission     Antibiotic Therapy             6-12 Hrs later  > 0.5                Strongly Recommended            >0.25 - <0.5         Recommended  0.1 - 0.25           Discouraged                   Remeasure/reassess PCT  <0.1                 Strongly Discouraged          Remeasure/reassess PCT      As 28 day mortality risk marker: \"Change in Procalcitonin Result\" (> 80 % or <=80 %) if Day 0 (or Day 1) and Day 4 values are available. Refer to http://www.[employer]-pct-calculator.com/  "   Change in PCT <=80 %   A decrease of PCT levels below or equal to 80 % defines a positive change in PCT test result representing a higher risk for 28-day all-cause mortality of patients diagnosed with severe sepsis or septic shock.  Change in PCT > 80 %   A decrease of PCT levels of more than 80 % defines a negative change in PCT result representing a lower risk for 28-day all-cause mortality of patients diagnosed with severe sepsis or septic shock.                Results may be falsely decreased if patient taking Biotin.     Basic Metabolic Panel [[id number]]  (Abnormal) Collected:  02/11/20 0207    Specimen:  Blood Updated:  02/11/20 0437     Glucose 147 mg/dL      BUN 24 mg/dL      Creatinine 0.87 mg/dL      Sodium 140 mmol/L      Potassium 4.5 mmol/L      Chloride 108 mmol/L      CO2 19.0 mmol/L      Calcium 8.8 mg/dL      eGFR Non African Amer 63 mL/min/1.73      BUN/Creatinine Ratio 27.6     Anion Gap 13.0 mmol/L     Narrative:       GFR Normal >60  Chronic Kidney Disease <60  Kidney Failure <15      Magnesium [[id number]]  (Normal) Collected:  02/11/20 0207    Specimen:  Blood Updated:  02/11/20 0437     Magnesium 1.6 mg/dL     POC Glucose Once [[id number]]  (Abnormal) Collected:  02/10/20 2041    Specimen:  Blood Updated:  02/10/20 2049     Glucose 111 mg/dL      Comment: Serial Number: [id number]:  [id number]       POC Glucose Once [[id number]]  (Abnormal) Collected:  02/10/20 1640    Specimen:  Blood Updated:  02/10/20 1643     Glucose 169 mg/dL      Comment: Serial Number: [id number]:  [id number]       POC Glucose Once [[id number]]  (Abnormal) Collected:  02/10/20 1127    Specimen:  Blood Updated:  02/10/20 1139     Glucose 155 mg/dL      Comment: Serial Number: [id number]:  [id number]       POC Glucose Once [[id number]]  (Abnormal) Collected:  02/10/20 0704    Specimen:  Blood Updated:  02/10/20 0706     Glucose 121 mg/dL      Comment: Serial Number: [id number]:  [id number]        Potassium [[id number]]  (Normal) Collected:  02/10/20 0408    Specimen:  Blood Updated:  02/10/20 0502     Potassium 4.4 mmol/L     Magnesium [[id number]]  (Normal) Collected:  02/10/20 0408    Specimen:  Blood Updated:  02/10/20 0502     Magnesium 1.6 mg/dL     Scan Slide [[id number]] Collected:  02/09/20 2306    Specimen:  Blood Updated:  02/09/20 2354     Scan Slide --     Comment: See Manual Differential Results       Manual Differential [[id number]]  (Abnormal) Collected:  02/09/20 2306    Specimen:  Blood Updated:  02/09/20 2354     Neutrophil % 31.0 %      Lymphocyte % 68.0 %      Monocyte % 1.0 %      Neutrophils Absolute 6.39 10*3/mm3      Lymphocytes Absolute 14.01 10*3/mm3      Monocytes Absolute 0.21 10*3/mm3      RBC Morphology Normal     WBC Morphology Normal     Platelet Morphology Normal    Narrative:       Reviewed by Pathologist within the past 30 days on 2/7/2020 .      CBC & Differential [[id number]] Collected:  02/09/20 2306    Specimen:  Blood Updated:  02/09/20 2354    Narrative:       The following orders were created for panel order CBC & Differential.  Procedure                               Abnormality         Status                     ---------                               -----------         ------                     CBC Auto Differential[[id number]]        Abnormal            Final result                 Please view results for these tests on the individual orders.    CBC Auto Differential [[id number]]  (Abnormal) Collected:  02/09/20 2306    Specimen:  Blood Updated:  02/09/20 2354     WBC 20.60 10*3/mm3      RBC 3.50 10*6/mm3      Hemoglobin 9.4 g/dL      Hematocrit 29.6 %      MCV 84.7 fL      MCH 26.9 pg      MCHC 31.7 g/dL      RDW 15.9 %      RDW-SD 47.7 fl      MPV 9.4 fL      Platelets 177 10*3/mm3     Magnesium [[id number]]  (Normal) Collected:  02/09/20 2306    Specimen:  Blood Updated:  02/09/20 2337     Magnesium 1.7 mg/dL     Comprehensive Metabolic Panel [[id number]]  (Abnormal)  Collected:  02/09/20 2306    Specimen:  Blood Updated:  02/09/20 2337     Glucose 138 mg/dL      BUN 26 mg/dL      Creatinine 0.98 mg/dL      Sodium 142 mmol/L      Potassium 4.8 mmol/L      Chloride 112 mmol/L      CO2 20.0 mmol/L      Calcium 8.9 mg/dL      Total Protein 5.7 g/dL      Albumin 3.60 g/dL      ALT (SGPT) 10 U/L      AST (SGOT) 13 U/L      Alkaline Phosphatase 89 U/L      Total Bilirubin <0.2 mg/dL      eGFR Non African Amer 55 mL/min/1.73      Globulin 2.1 gm/dL      A/G Ratio 1.7 g/dL      BUN/Creatinine Ratio 26.5     Anion Gap 10.0 mmol/L     Narrative:       GFR Normal >60  Chronic Kidney Disease <60  Kidney Failure <15      Phosphorus [[id number]]  (Normal) Collected:  02/09/20 2306    Specimen:  Blood Updated:  02/09/20 2337     Phosphorus 2.6 mg/dL     aPTT [[id number]]  (Abnormal) Collected:  02/09/20 2306    Specimen:  Blood Updated:  02/09/20 2331     PTT 22.1 seconds     Protime-INR [[id number]]  (Normal) Collected:  02/09/20 2306    Specimen:  Blood Updated:  02/09/20 2331     Protime 9.9 Seconds      INR 0.93    POC Glucose Once [[id number]]  (Abnormal) Collected:  02/09/20 1931    Specimen:  Blood Updated:  02/09/20 1932     Glucose 168 mg/dL      Comment: Serial Number: [id number]:  [id number]               Imaging Results (All)     Procedure Component Value Units Date/Time    CT Chest Without Contrast [[id number]] Collected:  02/12/20 0821     Updated:  02/12/20 0830    Narrative:       CT CHEST WO CONTRAST-     Date of Exam: 2/12/2020 7:39 AM     Indication: Shortness of breath, cough and weakness. Former smoker of 25  years. Quit 25 years ago.  HISTORY of mycobacterium avium complex. CLL.  COPD, acute bronchitis. HISTORY of breast cancer with right mastectomy.     Comparison: CT chest 02/27/2019, AP portable chest 02/06/2020.     Technique: Serial and axial CT images of the chest were obtained.  Reconstructions in the coronal and sagittal planes were performed.   Automated exposure  control and iterative reconstruction methods were  used.     FINDINGS:     Airspace disease in the medial left lower lobe is unchanged from  02/27/2019, suggesting chronic atelectasis. This may represent passive  atelectasis related to the patient's large hiatal hernia which projects  to the left of midline.     Mild linear subsegmental atelectatic changes are present within the  right lower lobe.     There is mild cylindrical bronchiectasis within both lungs, greatest in  the lower lobes. There is partial opacification of left lower lobe  bronchi and bronchioles. Mild bronchial wall thickening is seen  predominantly in a bibasilar distribution.     No acute lung consolidations are identified. Chronic appearing mild  apical scarring.     Benign calcified granulomatous changes are present within the right  upper lobe and right lower lobe. No suspicious noncalcified pulmonary  nodules are seen.     Right mastectomy changes are redemonstrated. Previously described  enlarged right axillary lymph node is no longer evident.     No pathologically enlarged lymph nodes identified. Heart size is within  normal limits. Mild coronary artery calcifications are noted. Thyroid  gland is within normal limits. No pericardial effusion. No pleural  effusion.     Lobulated contour of each kidney suggests cortical scarring. The  remainder of included upper abdominal organs have a normal noncontrast  appearance.     No acute osseous abnormalities. Degenerative changes of the right  shoulder and of the thoracic spine. No suspicious osteolytic or  osteoblastic abnormalities identified.                Impression:          1. Medial left lower lobe airspace disease is very similar to the  02/27/2019 examination and may represent chronic atelectasis. Mild  bronchial and bronchiolar opacification in this same area of atelectatic  lung.  2. Minimal subsegmental atelectasis in the right lower lobe.  3. Mild cylindrical bronchiectasis in both  lungs. There is mild  bronchial wall thickening in the bilateral lower lobe suggesting  features of bronchitis.  4. Right mastectomy.     Electronically Signed By-Dr. Marta Carrillo MD On:2/12/2020 8:27 AM  This report was finalized on [id number] by Dr. Marta Carrillo MD.    XR Chest 1 View [[id number]] Collected:  02/06/20 1554     Updated:  02/06/20 1557    Narrative:          XR CHEST 1 VW-     Date of Exam: 2/6/2020 3:13 PM     Indication: cough; E03.9-Hypothyroidism, unspecified.     Comparison Exams: 02/04/2020     Technique: AP view of the chest     FINDINGS:  No new consolidations or pleural effusions are observed. The cardiac  silhouette and mediastinum are stable. No acute osseous abnormalities  are identified.       Impression:       There is no significant change when compared to the prior study. There  is no evidence for acute cardiopulmonary process.     Electronically Signed By-Frederick Lucas On:2/6/2020 3:54 PM  This report was finalized on [id number] by  Frederick Lucas, .                Discharge Disposition:  Home-Health Care [facility] [place]    Discharge Medications:     Discharge Medications      New Medications      Instructions Start Date   HYDROcod Polst-CPM Polst ER 10-8 MG/5ML ER suspension  Commonly known as:  TUSSIONEX PENNKINETIC ER   5 mL, Oral, Every 12 Hours PRN      ipratropium-albuterol 0.5-2.5 mg/3 ml nebulizer  Commonly known as:  DUO-NEB   3 mL, Nebulization, Every 4 Hours - RT, J 44.9         Changes to Medications      Instructions Start Date   levothyroxine 100 MCG tablet  Commonly known as:  SYNTHROID, LEVOTHROID  What changed:    · how much to take  · when to take this   100 mcg, Oral, Every Morning Before Breakfast, Take on empty stomach. Thyroid needs to be rechecked.      metoclopramide 10 MG tablet  Commonly known as:  REGLAN  What changed:  Another medication with the same name was changed. Make sure you understand how and when to take each.   10 mg, Oral, Every Morning         metoclopramide 5 MG tablet  Commonly known as:  REGLAN  What changed:  when to take this   5 mg, Oral, 3 Times Daily PRN      predniSONE 10 MG tablet  Commonly known as:  DELTASONE  What changed:  See the new instructions.   Take 4 tablets by mouth Daily for 2 days, THEN 3 tablets Daily for 2 days, THEN 2 tablets Daily for 2 days, THEN 1 tablet Daily for 2 days.   Start Date:  February 12, 2020        Continue These Medications      Instructions Start Date   amLODIPine 10 MG tablet  Commonly known as:  NORVASC   10 mg, Oral, Daily With Lunch      anastrozole 1 MG tablet  Commonly known as:  ARIMIDEX   1 mg, Oral, Daily      azithromycin 250 MG tablet  Commonly known as:  ZITHROMAX   250 mg, Oral, 3 Times Weekly, Every Tuesday, Thursday, and Saturday       cetirizine 10 MG tablet  Commonly known as:  zyrTEC   10 mg, Oral, Daily      chlorthalidone 25 MG tablet  Commonly known as:  HYGROTON   25 mg, Oral, Daily      docusate sodium 100 MG capsule  Commonly known as:  COLACE   100 mg, Oral, 2 Times Daily      ethambutol 400 MG tablet  Commonly known as:  MYAMBUTOL   400 mg, Oral, 3 Times Weekly, Every Monday, Wednesday, and Friday      ferrous sulfate 324 (65 Fe) MG tablet delayed-release EC tablet   324 mg, Oral, Daily      losartan 100 MG tablet  Commonly known as:  COZAAR   100 mg, Oral, Daily      metFORMIN  MG 24 hr tablet  Commonly known as:  GLUCOPHAGE-XR   500 mg, Oral, Daily With Breakfast      mirtazapine 15 MG tablet  Commonly known as:  REMERON   15 mg, Oral, Nightly      pantoprazole 40 MG EC tablet  Commonly known as:  PROTONIX   40 mg, Oral, Every Morning Before Breakfast, Take on an empty stomach!      rifAMPin 300 MG capsule  Commonly known as:  RIFADIN   300 mg, Oral, 3 Times Weekly      rosuvastatin 10 MG tablet  Commonly known as:  CRESTOR   10 mg, Oral, Nightly      sertraline 100 MG tablet  Commonly known as:  ZOLOFT   100 mg, Oral, Nightly      sucralfate 1 g tablet  Commonly known as:   CARAFATE   1 g, Oral, 4 Times Daily Before Meals & Nightly      tolterodine 2 MG tablet  Commonly known as:  DETROL   2 mg, Oral, 2 Times Daily      vitamin B-12 500 MCG tablet  Commonly known as:  CYANOCOBALAMIN   1,000 mcg, Oral, Daily         Stop These Medications    levoFLOXacin 500 MG tablet  Commonly known as:  LEVAQUIN            Discharge Diet:   Diet Instructions     Diet: Consistent Carbohydrate      Discharge Diet:  Consistent Carbohydrate          Activity at Discharge:   Activity Instructions     Activity as [place]            Follow-up Appointments:  Future Appointments   Date Time Provider Department Center   2/14/2020 10:30 AM Jemima Fontaine MD [facility] None   3/16/2020  3:30 PM [facility] DI 2 [facility] MAMMO [facility]   3/16/2020  4:00 PM [facility] US 1 [facility] US [facility]         Test Results Pending at Discharge:  None     Condition on Discharge:    Stable      Risk for Readmission (LACE): Score: 10 (2/12/2020  6:01 AM)          Violet Fletcher DO  02/12/20  6:53 PM    Time: Discharge 34 min with face-to-face history/exam, writing all prescriptions, and documenting discharge data including care coordination

## 2020-02-13 ENCOUNTER — READMISSION MANAGEMENT (OUTPATIENT)
Dept: CALL CENTER | Facility: HOSPITAL | Age: 80
End: 2020-02-13

## 2020-02-13 NOTE — PROGRESS NOTES
Case Management Discharge Note                                   Final Discharge Disposition Code: 01 - home or self-care

## 2020-02-13 NOTE — OUTREACH NOTE
Prep Survey      Responses   Facility patient discharged from?  [facility]   Is patient eligible?  Yes   Discharge diagnosis  Acute bronchiolitis due to human metapneumovirus    Does the patient have one of the following disease processes/diagnoses(primary or secondary)?  Other   Does the patient have Home health ordered?  No   Is there a DME ordered?  No   What DME was ordered?  nebulizer   Comments regarding appointments  see AVS   Prep survey completed?  Yes          Radha Whitaker RN

## 2020-02-14 ENCOUNTER — OFFICE VISIT (OUTPATIENT)
Dept: FAMILY MEDICINE CLINIC | Facility: CLINIC | Age: 80
End: 2020-02-14

## 2020-02-14 ENCOUNTER — READMISSION MANAGEMENT (OUTPATIENT)
Dept: CALL CENTER | Facility: HOSPITAL | Age: 80
End: 2020-02-14

## 2020-02-14 VITALS
SYSTOLIC BLOOD PRESSURE: 157 MMHG | HEIGHT: 65 IN | WEIGHT: 179.8 LBS | DIASTOLIC BLOOD PRESSURE: 77 MMHG | TEMPERATURE: 98.3 F | BODY MASS INDEX: 29.96 KG/M2 | OXYGEN SATURATION: 90 % | HEART RATE: 86 BPM

## 2020-02-14 DIAGNOSIS — D50.8 OTHER IRON DEFICIENCY ANEMIA: ICD-10-CM

## 2020-02-14 DIAGNOSIS — E56.9 MULTIPLE VITAMIN DEFICIENCY: ICD-10-CM

## 2020-02-14 DIAGNOSIS — E03.9 ACQUIRED HYPOTHYROIDISM: ICD-10-CM

## 2020-02-14 DIAGNOSIS — G47.00 INSOMNIA, UNSPECIFIED TYPE: ICD-10-CM

## 2020-02-14 DIAGNOSIS — E55.9 VITAMIN D DEFICIENCY, UNSPECIFIED: ICD-10-CM

## 2020-02-14 DIAGNOSIS — I10 ESSENTIAL HYPERTENSION: ICD-10-CM

## 2020-02-14 DIAGNOSIS — Z09 HOSPITAL DISCHARGE FOLLOW-UP: ICD-10-CM

## 2020-02-14 DIAGNOSIS — C50.111 MALIGNANT NEOPLASM OF CENTRAL PORTION OF RIGHT FEMALE BREAST, UNSPECIFIED ESTROGEN RECEPTOR STATUS (HCC): Chronic | ICD-10-CM

## 2020-02-14 DIAGNOSIS — J20.8 ACUTE BRONCHITIS DUE TO HUMAN METAPNEUMOVIRUS (HMPV): Primary | ICD-10-CM

## 2020-02-14 DIAGNOSIS — E55.9 VITAMIN D DEFICIENCY: Primary | ICD-10-CM

## 2020-02-14 DIAGNOSIS — B97.81 ACUTE BRONCHITIS DUE TO HUMAN METAPNEUMOVIRUS (HMPV): Primary | ICD-10-CM

## 2020-02-14 PROBLEM — K59.00 CONSTIPATION: Chronic | Status: RESOLVED | Noted: 2020-02-06 | Resolved: 2020-02-14

## 2020-02-14 PROBLEM — R35.0 INCREASED FREQUENCY OF URINATION: Status: RESOLVED | Noted: 2017-08-07 | Resolved: 2020-02-14

## 2020-02-14 PROBLEM — A31.0 MYCOBACTERIUM AVIUM COMPLEX: Chronic | Status: RESOLVED | Noted: 2019-03-21 | Resolved: 2020-02-14

## 2020-02-14 LAB
25(OH)D3 SERPL-MCNC: 19.7 NG/ML (ref 30–100)
FERRITIN SERPL-MCNC: 44.12 NG/ML (ref 13–150)
IRON 24H UR-MRATE: 37 MCG/DL (ref 37–145)
IRON SATN MFR SERPL: 11 % (ref 20–50)
TIBC SERPL-MCNC: 325 MCG/DL (ref 298–536)
TRANSFERRIN SERPL-MCNC: 218 MG/DL (ref 200–360)
VIT B12 BLD-MCNC: 1487 PG/ML (ref 211–946)

## 2020-02-14 PROCEDURE — 99214 OFFICE O/P EST MOD 30 MIN: CPT | Performed by: FAMILY MEDICINE

## 2020-02-14 PROCEDURE — 82306 VITAMIN D 25 HYDROXY: CPT | Performed by: FAMILY MEDICINE

## 2020-02-14 PROCEDURE — 82607 VITAMIN B-12: CPT | Performed by: FAMILY MEDICINE

## 2020-02-14 PROCEDURE — 36415 COLL VENOUS BLD VENIPUNCTURE: CPT | Performed by: FAMILY MEDICINE

## 2020-02-14 RX ORDER — ANASTROZOLE 1 MG/1
1 TABLET ORAL DAILY
Qty: 90 TABLET | Refills: 0 | Status: SHIPPED | OUTPATIENT
Start: 2020-02-14 | End: 2020-05-15 | Stop reason: SDUPTHER

## 2020-02-14 RX ORDER — ERGOCALCIFEROL 1.25 MG/1
50000 CAPSULE ORAL WEEKLY
Qty: 12 CAPSULE | Refills: 0 | Status: SHIPPED | OUTPATIENT
Start: 2020-02-14 | End: 2020-05-04

## 2020-02-14 RX ORDER — MIRTAZAPINE 15 MG/1
7.5 TABLET, FILM COATED ORAL NIGHTLY
Qty: 90 TABLET | Refills: 1
Start: 2020-02-14 | End: 2020-05-15 | Stop reason: SDUPTHER

## 2020-02-14 RX ORDER — LEVOTHYROXINE SODIUM 0.07 MG/1
75 TABLET ORAL DAILY
Qty: 90 TABLET | Refills: 1 | Status: SHIPPED | OUTPATIENT
Start: 2020-02-14 | End: 2020-05-15 | Stop reason: DRUGHIGH

## 2020-02-14 NOTE — OUTREACH NOTE
Medical Week 1 Survey      Responses   Facility patient discharged from?  [facility]   Does the patient have one of the following disease processes/diagnoses(primary or secondary)?  Other   Is there a successful TCM telephone encounter documented?  No   Week 1 attempt successful?  Yes   Call start time  1510   Call end time  1513   Is patient permission given to speak with other caregiver?  Yes   List who call center can speak with  CHERYL SLAUGHTER   Person spoke with today (if not patient) and relationship  CHERYL SLAUGHTER- DAUGHTER   Meds reviewed with patient/caregiver?  Yes   Is the patient having any side effects they believe may be caused by any medication additions or changes?  No   Does the patient have all medications ordered at discharge?  Yes   Is the patient taking all medications as directed (includes completed medication regime)?  Yes   Does the patient have a primary care provider?   Yes   Does the patient have an appointment with their PCP within 7 days of discharge?  Yes   Comments regarding PCP  PATIENT SAW PCP EARLIER TODAY   Has the patient kept scheduled appointments due by today?  Yes   Has home health visited the patient within 72 hours of discharge?  N/A   Did the patient receive a copy of their discharge instructions?  Yes   Nursing interventions  Reviewed instructions with patient   What is the patient's perception of their health status since discharge?  Improving   Is the patient/caregiver able to teach back signs and symptoms related to disease process for when to call PCP?  Yes   Is the patient/caregiver able to teach back signs and symptoms related to disease process for when to call 911?  Yes   Is the patient/caregiver able to teach back the hierarchy of who to call/visit for symptoms/problems? PCP, Specialist, Home health nurse, Urgent Care, ED, 911  Yes   Week 1 call completed?  Yes          Yisel Mckeon LPN

## 2020-02-21 ENCOUNTER — READMISSION MANAGEMENT (OUTPATIENT)
Dept: CALL CENTER | Facility: HOSPITAL | Age: 80
End: 2020-02-21

## 2020-02-21 NOTE — OUTREACH NOTE
Medical Week 2 Survey      Responses   Facility patient discharged from?  [facility]   Does the patient have one of the following disease processes/diagnoses(primary or secondary)?  Other   Week 2 attempt successful?  No   Revoke  Decline to participate [No answer/Left voicemail]          Naomy Atkinson LPN

## 2020-02-26 DIAGNOSIS — E03.9 HYPOTHYROIDISM, UNSPECIFIED TYPE: ICD-10-CM

## 2020-03-03 RX ORDER — LEVOTHYROXINE SODIUM 0.1 MG/1
100 TABLET ORAL
Qty: 30 TABLET | Refills: 5 | Status: SHIPPED | OUTPATIENT
Start: 2020-03-03 | End: 2021-07-02

## 2020-03-16 ENCOUNTER — HOSPITAL ENCOUNTER (OUTPATIENT)
Dept: ULTRASOUND IMAGING | Facility: HOSPITAL | Age: 80
Discharge: HOME | End: 2020-03-16

## 2020-03-16 ENCOUNTER — HOSPITAL ENCOUNTER (OUTPATIENT)
Dept: MAMMOGRAPHY | Facility: HOSPITAL | Age: 80
Discharge: HOME | End: 2020-03-16
Admitting: PHYSICIAN ASSISTANT

## 2020-03-16 DIAGNOSIS — N64.4 BREAST PAIN, LEFT: ICD-10-CM

## 2020-03-16 PROCEDURE — G0279 TOMOSYNTHESIS, MAMMO: HCPCS

## 2020-03-16 PROCEDURE — 77065 DX MAMMO INCL CAD UNI: CPT

## 2020-03-22 RX ORDER — METOCLOPRAMIDE 5 MG/1
5 TABLET ORAL 3 TIMES DAILY PRN
Qty: 60 TABLET | Refills: 5 | Status: SHIPPED | OUTPATIENT
Start: 2020-03-22 | End: 2020-07-31

## 2020-05-04 DIAGNOSIS — E55.9 VITAMIN D DEFICIENCY: ICD-10-CM

## 2020-05-04 RX ORDER — ERGOCALCIFEROL 1.25 MG/1
CAPSULE ORAL
Qty: 12 CAPSULE | Refills: 0 | Status: SHIPPED | OUTPATIENT
Start: 2020-05-04 | End: 2020-05-15 | Stop reason: SDUPTHER

## 2020-05-10 RX ORDER — METFORMIN HYDROCHLORIDE 500 MG/1
TABLET, EXTENDED RELEASE ORAL
Qty: 90 TABLET | Refills: 1 | Status: SHIPPED | OUTPATIENT
Start: 2020-05-10 | End: 2020-05-10

## 2020-05-11 NOTE — TELEPHONE ENCOUNTER
Accidentally submitted script.  Appears to be dc'd by PCP.  Left voicemail with pharmacy to not fill.

## 2020-05-15 ENCOUNTER — OFFICE VISIT (OUTPATIENT)
Dept: FAMILY MEDICINE CLINIC | Facility: CLINIC | Age: 80
End: 2020-05-15

## 2020-05-15 VITALS
SYSTOLIC BLOOD PRESSURE: 138 MMHG | DIASTOLIC BLOOD PRESSURE: 70 MMHG | BODY MASS INDEX: 30.66 KG/M2 | TEMPERATURE: 96.9 F | HEIGHT: 65 IN | WEIGHT: 184 LBS | OXYGEN SATURATION: 92 % | HEART RATE: 72 BPM

## 2020-05-15 DIAGNOSIS — C50.111 MALIGNANT NEOPLASM OF CENTRAL PORTION OF RIGHT BREAST IN FEMALE, ESTROGEN RECEPTOR POSITIVE (HCC): ICD-10-CM

## 2020-05-15 DIAGNOSIS — F41.8 MIXED ANXIETY AND DEPRESSIVE DISORDER: ICD-10-CM

## 2020-05-15 DIAGNOSIS — K44.9 HIATAL HERNIA WITH GERD: ICD-10-CM

## 2020-05-15 DIAGNOSIS — C91.10 CHRONIC LYMPHOID LEUKEMIA (HCC): ICD-10-CM

## 2020-05-15 DIAGNOSIS — K21.9 HIATAL HERNIA WITH GERD: ICD-10-CM

## 2020-05-15 DIAGNOSIS — E55.9 VITAMIN D DEFICIENCY: ICD-10-CM

## 2020-05-15 DIAGNOSIS — E78.2 MIXED HYPERLIPIDEMIA: Chronic | ICD-10-CM

## 2020-05-15 DIAGNOSIS — Z11.59 NEED FOR HEPATITIS C SCREENING TEST: ICD-10-CM

## 2020-05-15 DIAGNOSIS — D50.8 OTHER IRON DEFICIENCY ANEMIA: ICD-10-CM

## 2020-05-15 DIAGNOSIS — I10 ESSENTIAL HYPERTENSION: Primary | ICD-10-CM

## 2020-05-15 DIAGNOSIS — N32.81 OVERACTIVE BLADDER: Chronic | ICD-10-CM

## 2020-05-15 DIAGNOSIS — Z17.0 MALIGNANT NEOPLASM OF CENTRAL PORTION OF RIGHT BREAST IN FEMALE, ESTROGEN RECEPTOR POSITIVE (HCC): ICD-10-CM

## 2020-05-15 PROBLEM — D64.9 ABSOLUTE ANEMIA: Status: ACTIVE | Noted: 2020-05-15

## 2020-05-15 PROBLEM — E11.00 DM HYPEROSMOLARITY TYPE II (HCC): Chronic | Status: RESOLVED | Noted: 2020-02-06 | Resolved: 2020-05-15

## 2020-05-15 LAB
25(OH)D3 SERPL-MCNC: 52 NG/ML (ref 30–100)
DACRYOCYTES BLD QL SMEAR: ABNORMAL
DEPRECATED RDW RBC AUTO: 40.8 FL (ref 37–54)
ERYTHROCYTE [DISTWIDTH] IN BLOOD BY AUTOMATED COUNT: 13.9 % (ref 12.3–15.4)
FERRITIN SERPL-MCNC: 43.1 NG/ML (ref 13–150)
HCT VFR BLD AUTO: 37.2 % (ref 34–46.6)
HCV AB SER DONR QL: NORMAL
HGB BLD-MCNC: 11.6 G/DL (ref 12–15.9)
IRON 24H UR-MRATE: 35 MCG/DL (ref 37–145)
IRON SATN MFR SERPL: 7 % (ref 20–50)
LYMPHOCYTES # BLD MANUAL: 25.77 10*3/MM3 (ref 0.7–3.1)
LYMPHOCYTES NFR BLD MANUAL: 1 % (ref 5–12)
LYMPHOCYTES NFR BLD MANUAL: 81 % (ref 19.6–45.3)
MCH RBC QN AUTO: 25.4 PG (ref 26.6–33)
MCHC RBC AUTO-ENTMCNC: 31.2 G/DL (ref 31.5–35.7)
MCV RBC AUTO: 81.6 FL (ref 79–97)
MONOCYTES # BLD AUTO: 0.32 10*3/MM3 (ref 0.1–0.9)
NEUTROPHILS # BLD AUTO: 5.73 10*3/MM3 (ref 1.7–7)
NEUTROPHILS NFR BLD MANUAL: 18 % (ref 42.7–76)
PLAT MORPH BLD: NORMAL
PLATELET # BLD AUTO: 208 10*3/MM3 (ref 140–450)
PMV BLD AUTO: 12.4 FL (ref 6–12)
POIKILOCYTOSIS BLD QL SMEAR: ABNORMAL
RBC # BLD AUTO: 4.56 10*6/MM3 (ref 3.77–5.28)
SMUDGE CELLS BLD QL SMEAR: ABNORMAL
TIBC SERPL-MCNC: 504 MCG/DL (ref 298–536)
TRANSFERRIN SERPL-MCNC: 338 MG/DL (ref 200–360)
WBC NRBC COR # BLD: 31.82 10*3/MM3 (ref 3.4–10.8)

## 2020-05-15 PROCEDURE — 82728 ASSAY OF FERRITIN: CPT | Performed by: FAMILY MEDICINE

## 2020-05-15 PROCEDURE — 85025 COMPLETE CBC W/AUTO DIFF WBC: CPT | Performed by: FAMILY MEDICINE

## 2020-05-15 PROCEDURE — 99214 OFFICE O/P EST MOD 30 MIN: CPT | Performed by: FAMILY MEDICINE

## 2020-05-15 PROCEDURE — 36415 COLL VENOUS BLD VENIPUNCTURE: CPT | Performed by: FAMILY MEDICINE

## 2020-05-15 PROCEDURE — 82306 VITAMIN D 25 HYDROXY: CPT | Performed by: FAMILY MEDICINE

## 2020-05-15 PROCEDURE — 85007 BL SMEAR W/DIFF WBC COUNT: CPT | Performed by: FAMILY MEDICINE

## 2020-05-15 PROCEDURE — 83540 ASSAY OF IRON: CPT | Performed by: FAMILY MEDICINE

## 2020-05-15 PROCEDURE — 86803 HEPATITIS C AB TEST: CPT | Performed by: FAMILY MEDICINE

## 2020-05-15 PROCEDURE — 84466 ASSAY OF TRANSFERRIN: CPT | Performed by: FAMILY MEDICINE

## 2020-05-15 RX ORDER — MIRTAZAPINE 7.5 MG/1
7.5 TABLET, FILM COATED ORAL NIGHTLY
Qty: 90 TABLET | Refills: 1 | Status: SHIPPED | OUTPATIENT
Start: 2020-05-15 | End: 2020-08-18 | Stop reason: SDUPTHER

## 2020-05-15 RX ORDER — ANASTROZOLE 1 MG/1
1 TABLET ORAL DAILY
Qty: 90 TABLET | Refills: 1 | Status: SHIPPED | OUTPATIENT
Start: 2020-05-15 | End: 2020-08-18 | Stop reason: SDUPTHER

## 2020-05-15 RX ORDER — AMLODIPINE BESYLATE 10 MG/1
10 TABLET ORAL
Qty: 90 TABLET | Refills: 1 | Status: SHIPPED | OUTPATIENT
Start: 2020-05-15 | End: 2020-08-18 | Stop reason: SDUPTHER

## 2020-05-15 RX ORDER — PANTOPRAZOLE SODIUM 40 MG/1
40 TABLET, DELAYED RELEASE ORAL
Qty: 90 TABLET | Refills: 1 | Status: SHIPPED | OUTPATIENT
Start: 2020-05-15 | End: 2020-08-18 | Stop reason: SDUPTHER

## 2020-05-15 RX ORDER — LOSARTAN POTASSIUM 100 MG/1
100 TABLET ORAL DAILY
Qty: 90 TABLET | Refills: 1 | Status: SHIPPED | OUTPATIENT
Start: 2020-05-15 | End: 2020-08-18 | Stop reason: SDUPTHER

## 2020-05-15 RX ORDER — SERTRALINE HYDROCHLORIDE 100 MG/1
100 TABLET, FILM COATED ORAL NIGHTLY
Qty: 90 TABLET | Refills: 1 | Status: SHIPPED | OUTPATIENT
Start: 2020-05-15 | End: 2020-08-18 | Stop reason: SDUPTHER

## 2020-05-15 RX ORDER — ERGOCALCIFEROL 1.25 MG/1
50000 CAPSULE ORAL
Qty: 12 CAPSULE | Refills: 0 | Status: SHIPPED | OUTPATIENT
Start: 2020-05-15 | End: 2020-11-01

## 2020-05-15 RX ORDER — RIFAMPIN 300 MG/1
300 CAPSULE ORAL EVERY OTHER DAY
COMMUNITY
Start: 2020-03-24 | End: 2020-05-15

## 2020-05-15 RX ORDER — CHLORTHALIDONE 25 MG/1
25 TABLET ORAL DAILY
Qty: 90 TABLET | Refills: 1 | Status: SHIPPED | OUTPATIENT
Start: 2020-05-15 | End: 2020-08-18 | Stop reason: SDUPTHER

## 2020-05-15 RX ORDER — ROSUVASTATIN CALCIUM 10 MG/1
10 TABLET, COATED ORAL NIGHTLY
Qty: 90 TABLET | Refills: 1 | Status: SHIPPED | OUTPATIENT
Start: 2020-05-15 | End: 2020-08-18 | Stop reason: SDUPTHER

## 2020-05-15 RX ORDER — SUCRALFATE 1 G/1
1 TABLET ORAL
Qty: 360 TABLET | Refills: 1 | Status: SHIPPED | OUTPATIENT
Start: 2020-05-15 | End: 2020-08-18 | Stop reason: SDUPTHER

## 2020-05-15 RX ORDER — TOLTERODINE TARTRATE 2 MG/1
2 TABLET, EXTENDED RELEASE ORAL 2 TIMES DAILY
Qty: 180 TABLET | Refills: 1 | Status: SHIPPED | OUTPATIENT
Start: 2020-05-15 | End: 2020-05-21

## 2020-05-15 NOTE — PROGRESS NOTES
Subjective:     Diane Guan is a 80 y.o. female who presents for  Chief Complaint   Patient presents with   • Hypertension     f/u on blood pressure    • Med Refill       This is a known patient to me.     Hypertension  Patient has a concurrent medical history of essential hypertension that is moderately controlled with amlodipine 10 mg, chlorthalidone 25 mg, and losartan 100 mg.  Patient is hypertensive in office.  Reports generally healthy diet.  Does not participate in regular exercise.  Associated cardiac comorbidity includes hyperlipidemia that is currently managed with rosuvastatin 10 mg nightly.    Cancer  Patient has a concurrent chronic lymphocytic leukemia and right breast cancer.  Patient underwent a right breast mastectomy and sentinel node biopsy.  Currently treated with anastrozole 1 mg daily for a total of 5 years.  Patient was previously evaluated by hematology/oncology; has not followed up in over a year.  CBC monitored regularly.  Patient is also being treated for associated iron deficiency anemia with 3 times weekly iron supplementation.      Patient also presents for refill of medications.    Preventative:  Health Maintenance   Topic Date Due   • TDAP/TD VACCINES (1 - Tdap) 04/29/1951   • ZOSTER VACCINE (1 of 2) 04/29/1991   • Pneumococcal Vaccine Once at 65 Years Old  04/29/2006   • HEPATITIS C SCREENING  08/28/2017   • MEDICARE ANNUAL WELLNESS  08/28/2017   • DIABETIC EYE EXAM  07/16/2020   • INFLUENZA VACCINE  08/01/2020   • HEMOGLOBIN A1C  08/06/2020   • LIPID PANEL  01/03/2021   • URINE MICROALBUMIN  01/03/2021       Past Medical Hx:  Past Medical History:   Diagnosis Date   • Acid reflux    • Anxiety disorder    • Arthritis    • Breast cancer (CMS/HCC)    • Depression    • H/O mammogram 08/2017   • Hemorrhoid    • Hyperlipidemia    • Hypertension    • TB (tuberculosis)     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    • MASTECTOMY Right    • TUBAL ABDOMINAL LIGATION         Family Hx:  Family History   Problem Relation Age of Onset   • Diabetes Mother    • Arthritis Other    • Heart disease Other         Social History:  Social History     Socioeconomic History   • Marital status:      Spouse name: Not on file   • Number of children: Not on file   • Years of education: Not on file   • Highest education level: Not on file   Tobacco Use   • Smoking status: Former Smoker   • Smokeless tobacco: Never Used   Substance and Sexual Activity   • Alcohol use: No     Frequency: Never   • Drug use: No   • Sexual activity: Defer       Allergies:  Patient has no known allergies.    Current Meds:    Current Outpatient Medications:   •  amLODIPine (NORVASC) 10 MG tablet, Take 1 tablet by mouth Daily With Lunch., Disp: 90 tablet, Rfl: 1  •  anastrozole (ARIMIDEX) 1 MG tablet, Take 1 tablet by mouth Daily., Disp: 90 tablet, Rfl: 0  •  cetirizine (zyrTEC) 10 MG tablet, Take 10 mg by mouth Daily., Disp: , Rfl:   •  chlorthalidone (HYGROTON) 25 MG tablet, Take 1 tablet by mouth Daily., Disp: 90 tablet, Rfl: 1  •  docusate sodium (COLACE) 100 MG capsule, Take 100 mg by mouth 2 (Two) Times a Day., Disp: , Rfl: 3  •  ferrous sulfate 324 (65 Fe) MG tablet delayed-release EC tablet, Take 324 mg by mouth 3 (Three) Times a Week., Disp: , Rfl:   •  ipratropium-albuterol (DUO-NEB) 0.5-2.5 mg/3 ml nebulizer, Take 3 mL by nebulization Every 4 (Four) Hours. J 44.9, Disp: 360 mL, Rfl: 0  •  levothyroxine (SYNTHROID, LEVOTHROID) 100 MCG tablet, Take 1 tablet by mouth Every Morning Before Breakfast. Take on empty stomach., Disp: 30 tablet, Rfl: 5  •  levothyroxine (SYNTHROID,  LEVOTHROID) 75 MCG tablet, Take 1 tablet by mouth Daily. Take on empty stomach., Disp: 90 tablet, Rfl: 1  •  losartan (COZAAR) 100 MG tablet, Take 1 tablet by mouth Daily., Disp: 90 tablet, Rfl: 1  •  metoclopramide (REGLAN) 5 MG tablet, Take 1 tablet by mouth 3 (Three) Times a Day As Needed (for nausea and vomiting)., Disp: 60 tablet, Rfl: 5  •  mirtazapine (REMERON) 15 MG tablet, Take 0.5 tablets by mouth Every Night., Disp: 90 tablet, Rfl: 1  •  pantoprazole (PROTONIX) 40 MG EC tablet, Take 1 tablet by mouth Every Morning Before Breakfast. Take on an empty stomach!, Disp: 90 tablet, Rfl: 1  •  rosuvastatin (CRESTOR) 10 MG tablet, Take 1 tablet by mouth Every Night., Disp: 90 tablet, Rfl: 1  •  sertraline (ZOLOFT) 100 MG tablet, Take 1 tablet by mouth Every Night., Disp: 90 tablet, Rfl: 1  •  sucralfate (CARAFATE) 1 g tablet, Take 1 tablet by mouth 4 (Four) Times a Day Before Meals & at Bedtime., Disp: 360 tablet, Rfl: 1  •  tolterodine (DETROL) 2 MG tablet, Take 1 tablet by mouth 2 (Two) Times a Day., Disp: 180 tablet, Rfl: 1  •  vitamin B-12 (CYANOCOBALAMIN) 500 MCG tablet, Take 1,000 mcg by mouth Daily., Disp: , Rfl:   •  vitamin D (ERGOCALCIFEROL) 1.25 MG (34194 UT) capsule capsule, TAKE 1 CAPSULE BY MOUTH WEEKLY, Disp: 12 capsule, Rfl: 0    The following portions of the patient's history were reviewed and updated as appropriate: allergies, current medications, past family history, past medical history, past social history, past surgical history and problem list.    Review of Systems  Review of Systems   Constitutional: Negative for chills, diaphoresis, fatigue and fever.   HENT: Positive for hearing loss (using hearing aids). Negative for congestion, rhinorrhea, sinus pressure, sneezing and sore throat.    Eyes: Negative for blurred vision, double vision and redness.   Respiratory: Negative for cough, shortness of breath and wheezing.    Cardiovascular: Negative for chest pain and leg swelling.  "  Gastrointestinal: Positive for GERD and indigestion. Negative for abdominal pain, constipation, diarrhea, nausea and vomiting.   Endocrine: Negative for polydipsia, polyphagia and polyuria.   Genitourinary: Positive for urgency and urinary incontinence. Negative for difficulty urinating, dysuria and hematuria.   Musculoskeletal: Positive for arthralgias. Negative for gait problem and myalgias.   Skin: Negative for rash and skin lesions.   Neurological: Negative for tremors, seizures, syncope and headache.   Psychiatric/Behavioral: Positive for sleep disturbance (multiple awakenings to urinate). Negative for depressed mood. The patient is not nervous/anxious.        Objective:     /73 (BP Location: Left arm, Patient Position: Sitting, Cuff Size: Large Adult)   Pulse 72   Temp 96.9 °F (36.1 °C) (Temporal)   Ht 165.1 cm (65\")   Wt 83.5 kg (184 lb)   SpO2 92%   BMI 30.62 kg/m²     Physical Exam   Constitutional: She is oriented to person, place, and time. She appears well-developed and well-nourished. No distress. She is obese.  HENT:   Head: Normocephalic and atraumatic.   Right Ear: Tympanic membrane and ear canal normal.   Left Ear: Tympanic membrane and ear canal normal.   Nose: Nose normal.   Mouth/Throat: Oropharynx is clear and moist and mucous membranes are normal.   Hearing aid in left ear.  Cerumen bilaterally.   Eyes: Pupils are equal, round, and reactive to light. Conjunctivae and EOM are normal. No scleral icterus.   Neck: Normal range of motion.   Cardiovascular: Normal rate, regular rhythm, normal heart sounds and intact distal pulses.   Pulmonary/Chest: Effort normal and breath sounds normal.   Abdominal: Soft. Bowel sounds are normal. There is no tenderness.   Neurological: She is alert and oriented to person, place, and time.   Skin: Skin is warm and dry. Capillary refill takes less than 2 seconds. No rash noted. She is not diaphoretic.   Psychiatric: She has a normal mood and affect. Her " behavior is normal.   Vitals reviewed.      Lab Results   Component Value Date    WBC 30.70 (C) 02/12/2020    HGB 8.6 (L) 02/12/2020    HCT 27.6 (L) 02/12/2020    MCV 84.6 02/12/2020     02/12/2020     Lab Results   Component Value Date    IRON 37 02/12/2020    TIBC 325 02/12/2020    FERRITIN 44.12 02/12/2020     Lab Results   Component Value Date    GLUCOSE 108 (H) 02/12/2020    BUN 22 02/12/2020    CREATININE 0.96 02/12/2020    EGFRIFNONA 56 (L) 02/12/2020    EGFRIFAFRI 59 (L) 04/27/2017    BCR 22.9 02/12/2020    K 4.5 02/12/2020    CO2 21.0 (L) 02/12/2020    CALCIUM 8.9 02/12/2020    ALBUMIN 3.60 02/09/2020    LABIL2 1.0 03/17/2019    AST 13 02/09/2020    ALT 10 02/09/2020     Lab Results   Component Value Date    HGBA1C 5.7 (H) 02/06/2020     Lab Results   Component Value Date    TSH 1.610 02/06/2020     Lab Results   Component Value Date    CHOL 374 (H) 01/03/2020    TRIG 311 (H) 01/03/2020    HDL 50 01/03/2020     (H) 01/03/2020        Assessment/Plan:     Diane was seen today for hypertension and med refill.    Diagnoses and all orders for this visit:    Essential hypertension  Comments:  BP goal <140/90.  Encouraged low-Na+ diet & 150 minutes exercise weekly.   Continue losartan 100 mg, amlodipine 10 mg, & chlorthalidone 25 mg.  Orders:  -     rosuvastatin (CRESTOR) 10 MG tablet; Take 1 tablet by mouth Every Night.  -     amLODIPine (NORVASC) 10 MG tablet; Take 1 tablet by mouth Daily With Lunch.  -     chlorthalidone (HYGROTON) 25 MG tablet; Take 1 tablet by mouth Daily.  -     losartan (COZAAR) 100 MG tablet; Take 1 tablet by mouth Daily.    Mixed hyperlipidemia  Comments:  Reviewed lipid panel & ASCVD risk factors.  Encouraged a diet rich in vegetables & 150 minutes of exercise weekly.  Continue rosuvastatin 10 mg nightly.  Orders:  -     rosuvastatin (CRESTOR) 10 MG tablet; Take 1 tablet by mouth Every Night.    Mixed anxiety and depressive disorder  Comments:  Currently stable on Zoloft  100 mg & mirtazapine 7.5 mg.  Orders:  -     mirtazapine (REMERON) 7.5 MG tablet; Take 1 tablet by mouth Every Night.  -     sertraline (ZOLOFT) 100 MG tablet; Take 1 tablet by mouth Every Night.    Hiatal hernia with GERD  Comments:  Discussed GERD appropriate diet.  Discussed health associated with chronic PPI use.  Continue PPI and Carafate on empty stomach.  Referred for surgery.  Orders:  -     pantoprazole (Protonix) 40 MG EC tablet; Take 1 tablet by mouth Every Morning Before Breakfast. Take on an empty stomach!  -     sucralfate (CARAFATE) 1 g tablet; Take 1 tablet by mouth 4 (Four) Times a Day Before Meals & at Bedtime.    Overactive bladder  Comments:  Discussed lifestyle modifications; using disposable briefs.    Advised limiting evening drinks.  Continue Detrol.  Orders:  -     tolterodine (DETROL) 2 MG tablet; Take 1 tablet by mouth 2 (Two) Times a Day.    Vitamin D deficiency  Comments:  Continue once weekly vitamin D supplementation.  Plan recheck levels.  Orders:  -     vitamin D (ERGOCALCIFEROL) 1.25 MG (27324 UT) capsule capsule; Take 1 capsule by mouth Every 7 (Seven) Days.  -     Vitamin D 25 Hydroxy    Other iron deficiency anemia  Comments:  May be related to CLL.  Continue iron supplementation 3 times a week.  Plan to recheck levels today.  Orders:  -     Iron Profile  -     Ferritin  -     CBC Auto Differential    Chronic lymphoid leukemia (CMS/HCC)  Comments:  Stage 0.  Previously evaluated by hematology/oncology.  Monitoring CBC regularly.  Orders:  -     Iron Profile  -     Ferritin  -     CBC Auto Differential    Malignant neoplasm of central portion of right breast in female, estrogen receptor positive (CMS/HCC)  Comments:  S/p mastectomy & sentinel node biopsy.  Followed by hematology/oncology.  Continue anastrozole 1 mg daily for a total of 5 years.  Orders:  -     anastrozole (ARIMIDEX) 1 MG tablet; Take 1 tablet by mouth Daily.    Need for hepatitis C screening test  -     Hepatitis  C antibody      Follow-up:     Return in about 3 months (around 8/15/2020) for Medicare Wellness.      Signature    Jemima Fontaine MD  [facility]        This document has been electronically signed by Jemima Fontaine MD on May 15, 2020 08:30

## 2020-05-21 ENCOUNTER — TELEPHONE (OUTPATIENT)
Dept: FAMILY MEDICINE CLINIC | Facility: CLINIC | Age: 80
End: 2020-05-21

## 2020-05-21 RX ORDER — OXYBUTYNIN CHLORIDE 5 MG/1
5 TABLET ORAL 2 TIMES DAILY
Qty: 60 TABLET | Refills: 5 | Status: SHIPPED | OUTPATIENT
Start: 2020-05-21 | End: 2020-08-18 | Stop reason: SINTOL

## 2020-05-21 NOTE — TELEPHONE ENCOUNTER
Please call patient and let her know that I sent a prescription for oxybutynin 5 mg PO BID to her pharmacy.

## 2020-06-30 PROBLEM — J20.8 ACUTE BRONCHITIS DUE TO HUMAN METAPNEUMOVIRUS: Status: RESOLVED | Noted: 2020-02-08 | Resolved: 2020-06-30

## 2020-06-30 PROBLEM — G47.33 OSA ON CPAP: Status: ACTIVE | Noted: 2020-06-30

## 2020-06-30 PROBLEM — M15.9 PRIMARY OSTEOARTHRITIS INVOLVING MULTIPLE JOINTS: Status: ACTIVE | Noted: 2019-06-25

## 2020-06-30 PROBLEM — M15.0 PRIMARY OSTEOARTHRITIS INVOLVING MULTIPLE JOINTS: Status: ACTIVE | Noted: 2019-06-25

## 2020-06-30 PROBLEM — E78.2 MIXED HYPERLIPIDEMIA: Status: ACTIVE | Noted: 2019-06-25

## 2020-06-30 PROBLEM — B97.81 ACUTE BRONCHITIS DUE TO HUMAN METAPNEUMOVIRUS: Status: RESOLVED | Noted: 2020-02-08 | Resolved: 2020-06-30

## 2020-06-30 PROBLEM — R06.89 CHRONIC RESPIRATORY INSUFFICIENCY: Status: ACTIVE | Noted: 2020-06-30

## 2020-06-30 PROBLEM — G47.34 NOCTURNAL HYPOXIA: Status: ACTIVE | Noted: 2020-06-30

## 2020-06-30 PROBLEM — Z99.89 OSA ON CPAP: Status: ACTIVE | Noted: 2020-06-30

## 2020-07-31 DIAGNOSIS — K44.9 HIATAL HERNIA WITH GERD: Primary | ICD-10-CM

## 2020-07-31 DIAGNOSIS — K21.9 HIATAL HERNIA WITH GERD: Primary | ICD-10-CM

## 2020-07-31 RX ORDER — METOCLOPRAMIDE 5 MG/1
5 TABLET ORAL 3 TIMES DAILY PRN
Qty: 60 TABLET | Refills: 5 | Status: SHIPPED | OUTPATIENT
Start: 2020-07-31 | End: 2020-08-18 | Stop reason: SDUPTHER

## 2020-08-18 ENCOUNTER — LAB (OUTPATIENT)
Dept: LAB | Facility: HOSPITAL | Age: 80
End: 2020-08-18

## 2020-08-18 ENCOUNTER — OFFICE VISIT (OUTPATIENT)
Dept: FAMILY MEDICINE CLINIC | Facility: CLINIC | Age: 80
End: 2020-08-18

## 2020-08-18 ENCOUNTER — LAB (OUTPATIENT)
Dept: FAMILY MEDICINE CLINIC | Facility: CLINIC | Age: 80
End: 2020-08-18

## 2020-08-18 VITALS
WEIGHT: 188 LBS | HEART RATE: 87 BPM | DIASTOLIC BLOOD PRESSURE: 72 MMHG | HEIGHT: 65 IN | OXYGEN SATURATION: 91 % | SYSTOLIC BLOOD PRESSURE: 132 MMHG | BODY MASS INDEX: 31.32 KG/M2 | TEMPERATURE: 98.4 F

## 2020-08-18 DIAGNOSIS — K21.9 HIATAL HERNIA WITH GERD: ICD-10-CM

## 2020-08-18 DIAGNOSIS — E78.2 MIXED HYPERLIPIDEMIA: Primary | ICD-10-CM

## 2020-08-18 DIAGNOSIS — E55.9 VITAMIN D DEFICIENCY: ICD-10-CM

## 2020-08-18 DIAGNOSIS — Z17.0 MALIGNANT NEOPLASM OF CENTRAL PORTION OF RIGHT BREAST IN FEMALE, ESTROGEN RECEPTOR POSITIVE (HCC): Chronic | ICD-10-CM

## 2020-08-18 DIAGNOSIS — E03.9 ACQUIRED HYPOTHYROIDISM: ICD-10-CM

## 2020-08-18 DIAGNOSIS — C91.10 CHRONIC LYMPHOID LEUKEMIA (HCC): Chronic | ICD-10-CM

## 2020-08-18 DIAGNOSIS — K44.9 HIATAL HERNIA WITH GERD: ICD-10-CM

## 2020-08-18 DIAGNOSIS — L82.0 INFLAMED SEBORRHEIC KERATOSIS: ICD-10-CM

## 2020-08-18 DIAGNOSIS — G47.33 OSA ON CPAP: ICD-10-CM

## 2020-08-18 DIAGNOSIS — E11.9 DIET-CONTROLLED TYPE 2 DIABETES MELLITUS (HCC): ICD-10-CM

## 2020-08-18 DIAGNOSIS — F41.8 MIXED ANXIETY AND DEPRESSIVE DISORDER: ICD-10-CM

## 2020-08-18 DIAGNOSIS — E78.2 MIXED HYPERLIPIDEMIA: ICD-10-CM

## 2020-08-18 DIAGNOSIS — Z99.89 OSA ON CPAP: ICD-10-CM

## 2020-08-18 DIAGNOSIS — M25.561 ARTHRALGIA OF RIGHT KNEE: ICD-10-CM

## 2020-08-18 DIAGNOSIS — C50.111 MALIGNANT NEOPLASM OF CENTRAL PORTION OF RIGHT BREAST IN FEMALE, ESTROGEN RECEPTOR POSITIVE (HCC): Chronic | ICD-10-CM

## 2020-08-18 DIAGNOSIS — I10 ESSENTIAL HYPERTENSION: ICD-10-CM

## 2020-08-18 DIAGNOSIS — E11.9 TYPE 2 DIABETES MELLITUS WITHOUT COMPLICATION, WITHOUT LONG-TERM CURRENT USE OF INSULIN (HCC): ICD-10-CM

## 2020-08-18 DIAGNOSIS — Z00.00 ENCOUNTER FOR MEDICARE ANNUAL WELLNESS EXAM: ICD-10-CM

## 2020-08-18 DIAGNOSIS — I10 ESSENTIAL HYPERTENSION: Primary | ICD-10-CM

## 2020-08-18 LAB
25(OH)D3 SERPL-MCNC: 53.5 NG/ML (ref 30–100)
ALBUMIN SERPL-MCNC: 4.2 G/DL (ref 3.5–5.2)
ALBUMIN UR-MCNC: <1.2 MG/DL
ALBUMIN/GLOB SERPL: 1.8 G/DL
ALP SERPL-CCNC: 119 U/L (ref 39–117)
ALT SERPL W P-5'-P-CCNC: 12 U/L (ref 1–33)
ANION GAP SERPL CALCULATED.3IONS-SCNC: 13.3 MMOL/L (ref 5–15)
AST SERPL-CCNC: 16 U/L (ref 1–32)
BILIRUB SERPL-MCNC: 0.3 MG/DL (ref 0–1.2)
BUN SERPL-MCNC: 34 MG/DL (ref 8–23)
BUN/CREAT SERPL: 22.8 (ref 7–25)
CALCIUM SPEC-SCNC: 9.6 MG/DL (ref 8.6–10.5)
CHLORIDE SERPL-SCNC: 106 MMOL/L (ref 98–107)
CO2 SERPL-SCNC: 24.7 MMOL/L (ref 22–29)
CREAT SERPL-MCNC: 1.49 MG/DL (ref 0.57–1)
CREAT UR-MCNC: 129.4 MG/DL
FERRITIN SERPL-MCNC: 44 NG/ML (ref 13–150)
GFR SERPL CREATININE-BSD FRML MDRD: 34 ML/MIN/1.73
GLOBULIN UR ELPH-MCNC: 2.4 GM/DL
GLUCOSE SERPL-MCNC: 110 MG/DL (ref 65–99)
HBA1C MFR BLD: 6.1 % (ref 3.5–5.6)
IRON 24H UR-MRATE: 40 MCG/DL (ref 37–145)
IRON SATN MFR SERPL: 8 % (ref 20–50)
MICROALBUMIN/CREAT UR: NORMAL MG/G{CREAT}
POTASSIUM SERPL-SCNC: 4.7 MMOL/L (ref 3.5–5.2)
PROT SERPL-MCNC: 6.6 G/DL (ref 6–8.5)
SODIUM SERPL-SCNC: 144 MMOL/L (ref 136–145)
TIBC SERPL-MCNC: 504 MCG/DL (ref 298–536)
TRANSFERRIN SERPL-MCNC: 338 MG/DL (ref 200–360)
TSH SERPL DL<=0.05 MIU/L-ACNC: 4.32 UIU/ML (ref 0.27–4.2)

## 2020-08-18 PROCEDURE — 83036 HEMOGLOBIN GLYCOSYLATED A1C: CPT

## 2020-08-18 PROCEDURE — 82043 UR ALBUMIN QUANTITATIVE: CPT

## 2020-08-18 PROCEDURE — 84466 ASSAY OF TRANSFERRIN: CPT

## 2020-08-18 PROCEDURE — 84443 ASSAY THYROID STIM HORMONE: CPT

## 2020-08-18 PROCEDURE — 80053 COMPREHEN METABOLIC PANEL: CPT

## 2020-08-18 PROCEDURE — 83540 ASSAY OF IRON: CPT

## 2020-08-18 PROCEDURE — 82728 ASSAY OF FERRITIN: CPT

## 2020-08-18 PROCEDURE — 82570 ASSAY OF URINE CREATININE: CPT

## 2020-08-18 PROCEDURE — 82306 VITAMIN D 25 HYDROXY: CPT

## 2020-08-18 PROCEDURE — G0439 PPPS, SUBSEQ VISIT: HCPCS | Performed by: FAMILY MEDICINE

## 2020-08-18 PROCEDURE — 36415 COLL VENOUS BLD VENIPUNCTURE: CPT

## 2020-08-18 PROCEDURE — 99213 OFFICE O/P EST LOW 20 MIN: CPT | Performed by: FAMILY MEDICINE

## 2020-08-18 RX ORDER — PANTOPRAZOLE SODIUM 40 MG/1
40 TABLET, DELAYED RELEASE ORAL
Qty: 90 TABLET | Refills: 1 | Status: SHIPPED | OUTPATIENT
Start: 2020-08-18 | End: 2021-03-25

## 2020-08-18 RX ORDER — AMLODIPINE BESYLATE 10 MG/1
10 TABLET ORAL
Qty: 90 TABLET | Refills: 1 | Status: SHIPPED | OUTPATIENT
Start: 2020-08-18 | End: 2021-05-07

## 2020-08-18 RX ORDER — SUCRALFATE 1 G/1
1 TABLET ORAL
Qty: 360 TABLET | Refills: 1 | Status: SHIPPED | OUTPATIENT
Start: 2020-08-18 | End: 2021-01-13 | Stop reason: HOSPADM

## 2020-08-18 RX ORDER — MIRTAZAPINE 7.5 MG/1
7.5 TABLET, FILM COATED ORAL NIGHTLY
Qty: 90 TABLET | Refills: 1 | Status: SHIPPED | OUTPATIENT
Start: 2020-08-18 | End: 2021-03-26 | Stop reason: SDUPTHER

## 2020-08-18 RX ORDER — LOSARTAN POTASSIUM 100 MG/1
100 TABLET ORAL DAILY
Qty: 90 TABLET | Refills: 1 | Status: SHIPPED | OUTPATIENT
Start: 2020-08-18 | End: 2021-01-13 | Stop reason: HOSPADM

## 2020-08-18 RX ORDER — METOCLOPRAMIDE 5 MG/1
5 TABLET ORAL 3 TIMES DAILY PRN
Qty: 270 TABLET | Refills: 1 | Status: SHIPPED | OUTPATIENT
Start: 2020-08-18 | End: 2021-06-24

## 2020-08-18 RX ORDER — SERTRALINE HYDROCHLORIDE 100 MG/1
100 TABLET, FILM COATED ORAL NIGHTLY
Qty: 90 TABLET | Refills: 1 | Status: SHIPPED | OUTPATIENT
Start: 2020-08-18 | End: 2021-01-13 | Stop reason: HOSPADM

## 2020-08-18 RX ORDER — ROSUVASTATIN CALCIUM 10 MG/1
10 TABLET, COATED ORAL NIGHTLY
Qty: 90 TABLET | Refills: 1 | Status: SHIPPED | OUTPATIENT
Start: 2020-08-18 | End: 2021-02-07

## 2020-08-18 RX ORDER — ANASTROZOLE 1 MG/1
1 TABLET ORAL DAILY
Qty: 90 TABLET | Refills: 1 | Status: SHIPPED | OUTPATIENT
Start: 2020-08-18 | End: 2021-05-10

## 2020-08-18 RX ORDER — CHLORTHALIDONE 25 MG/1
25 TABLET ORAL DAILY
Qty: 90 TABLET | Refills: 1 | Status: SHIPPED | OUTPATIENT
Start: 2020-08-18 | End: 2021-01-13 | Stop reason: HOSPADM

## 2020-10-31 DIAGNOSIS — E55.9 VITAMIN D DEFICIENCY: ICD-10-CM

## 2020-11-01 RX ORDER — ERGOCALCIFEROL 1.25 MG/1
50000 CAPSULE ORAL
Qty: 12 CAPSULE | Refills: 1 | Status: SHIPPED | OUTPATIENT
Start: 2020-11-01 | End: 2021-05-03

## 2020-11-01 NOTE — TELEPHONE ENCOUNTER
Vitamin D level last checked in August 2020. Continue once weekly supplementation. Plan to recheck levels at next office visit in February 2021.

## 2020-11-05 ENCOUNTER — OFFICE VISIT (OUTPATIENT)
Dept: SURGERY | Facility: CLINIC | Age: 80
End: 2020-11-05

## 2020-11-05 ENCOUNTER — PREP FOR SURGERY (OUTPATIENT)
Dept: OTHER | Facility: HOSPITAL | Age: 80
End: 2020-11-05

## 2020-11-05 ENCOUNTER — TELEPHONE (OUTPATIENT)
Dept: SURGERY | Facility: CLINIC | Age: 80
End: 2020-11-05

## 2020-11-05 VITALS
SYSTOLIC BLOOD PRESSURE: 162 MMHG | HEIGHT: 65 IN | OXYGEN SATURATION: 92 % | TEMPERATURE: 97.5 F | WEIGHT: 188 LBS | BODY MASS INDEX: 31.32 KG/M2 | DIASTOLIC BLOOD PRESSURE: 79 MMHG | HEART RATE: 96 BPM

## 2020-11-05 DIAGNOSIS — K44.9 HIATAL HERNIA WITH GASTROESOPHAGEAL REFLUX: Primary | ICD-10-CM

## 2020-11-05 DIAGNOSIS — K21.9 HIATAL HERNIA WITH GASTROESOPHAGEAL REFLUX: Primary | ICD-10-CM

## 2020-11-05 PROCEDURE — 99213 OFFICE O/P EST LOW 20 MIN: CPT | Performed by: SURGERY

## 2020-11-05 RX ORDER — SODIUM CHLORIDE 9 MG/ML
100 INJECTION, SOLUTION INTRAVENOUS CONTINUOUS
Status: CANCELLED | OUTPATIENT
Start: 2020-11-05

## 2020-11-05 NOTE — H&P
Subjective   Diane Guan is a 80 y.o. female.     History of present illness  Diane is a pleasant 80-year-old seen in the office today at request of Dr. Frankel for significant pulmonary compromise for from her giant hiatal hernia.  He states that she has had reflux for quite a long time and so long as she takes her medicine she really does not have heartburn and is able to eat fairly well.  States however if she bends over she gets lots of regurgitation and has horrible chronic cough.  Dr. Frankel  thinks that fixing her hiatal hernia will help her breathing considerably.  Is been over a year since her last EGD so we have recommended she undergo an EGD with dilatation.  She is agreeable.    Past Medical History:   Diagnosis Date   • Acute bronchitis due to human metapneumovirus 2/8/2020   • Anxiety disorder    • Arthritis    • Breast cancer (CMS/[facility]) 02/2019    Invasive ductal carcinoma   • Chronic lymphocytic leukemia (CLL), B-cell (CMS/[facility]) 01/2019   • Depression    • Diverticulosis of colon 2018    Identified on colonoscopy   • Hemorrhoid    • Hiatal hernia with GERD    • Hyperlipidemia    • Hypertension    • Mycobacterium avium complex (CMS/[facility]) 02/2019    aspiration pneumonia; completed abx therapy   • LIBBY on CPAP     AHI 34.5       Past Surgical History:   Procedure Laterality Date   • BLADDER SURGERY     • BRONCHOSCOPY N/A 9/13/2019    Procedure: BRONCHOSCOPY with bronchial washing;  Surgeon: Marnie Frankel MD;  Location: [facility] ENDOSCOPY;  Service: Pulmonary   • COLONOSCOPY  07/19/2018    severe diverticulosis   • CYST REMOVAL      Removed a fatty cyst off of her back   • MASTECTOMY Right 02/04/2019    Invasive ductal carcinoma   • TUBAL ABDOMINAL LIGATION         Outpatient Encounter Medications as of 11/5/2020   Medication Sig Dispense Refill   • amLODIPine (NORVASC) 10 MG tablet Take 1 tablet by mouth Daily With Lunch. 90 tablet 1   • anastrozole (ARIMIDEX) 1 MG tablet Take 1 tablet by mouth Daily. 90  tablet 1   • cetirizine (zyrTEC) 10 MG tablet Take 10 mg by mouth Daily.     • chlorthalidone (HYGROTON) 25 MG tablet Take 1 tablet by mouth Daily. 90 tablet 1   • docusate sodium (COLACE) 100 MG capsule Take 100 mg by mouth 2 (Two) Times a Day.  3   • ferrous sulfate 324 (65 Fe) MG tablet delayed-release EC tablet Take 324 mg by mouth 3 (Three) Times a Week.     • ipratropium-albuterol (DUO-NEB) 0.5-2.5 mg/3 ml nebulizer Take 3 mL by nebulization Every 4 (Four) Hours. J 44.9 360 mL 0   • levothyroxine (SYNTHROID, LEVOTHROID) 100 MCG tablet Take 1 tablet by mouth Every Morning Before Breakfast. Take on empty stomach. 30 tablet 5   • losartan (COZAAR) 100 MG tablet Take 1 tablet by mouth Daily. 90 tablet 1   • metoclopramide (REGLAN) 5 MG tablet Take 1 tablet by mouth 3 (Three) Times a Day As Needed (for nausea and vomiting). 270 tablet 1   • mirtazapine (REMERON) 7.5 MG tablet Take 1 tablet by mouth Every Night. 90 tablet 1   • pantoprazole (Protonix) 40 MG EC tablet Take 1 tablet by mouth Every Morning Before Breakfast. Take on an empty stomach! 90 tablet 1   • rosuvastatin (CRESTOR) 10 MG tablet Take 1 tablet by mouth Every Night. 90 tablet 1   • sertraline (ZOLOFT) 100 MG tablet Take 1 tablet by mouth Every Night. 90 tablet 1   • sucralfate (CARAFATE) 1 g tablet Take 1 tablet by mouth 4 (Four) Times a Day Before Meals & at Bedtime. 360 tablet 1   • vitamin D (ERGOCALCIFEROL) 1.25 MG (31255 UT) capsule capsule Take 1 capsule by mouth Every 7 (Seven) Days. 12 capsule 1     No facility-administered encounter medications on file as of 11/5/2020.        No Known Allergies    Family History   Problem Relation Age of Onset   • Diabetes Mother    • Arthritis Other    • Heart disease Other        Social History     Socioeconomic History   • Marital status:      Spouse name: Not on file   • Number of children: Not on file   • Years of education: Not on file   • Highest education level: Not on file   Tobacco Use   •  Smoking status: Former Smoker   • Smokeless tobacco: Never Used   Substance and Sexual Activity   • Alcohol use: No     Frequency: Never   • Drug use: No   • Sexual activity: Defer       The following portions of the patient's history were reviewed and updated as appropriate: allergies, current medications, past family history, past medical history, past social history, past surgical history and problem list.    Objective       Assessment/Plan   There are no diagnoses linked to this encounter.    Complete review of systems is done and unremarkable except for the chief complaint and the above-noted exceptions.    Physical exam shows pleasant 80-year-old female.  HEENT is negative.  Heart regular.  Lungs clear.  Abdomen soft nontender without mass.  Extremities show equal range of motion in the upper and lower extremities.  She has symmetrical strength and usage.  Neuro shows no obvious focal deficit.    Impression: [place] 10-year-old with a very large hiatal hernia with pulmonary compromise    Plan: EGD with dilatation to assess suitability for surgery           Den Plummer DO  11/5/2020  15:13 EST

## 2020-11-05 NOTE — PROGRESS NOTES
Subjective   Diane Guan is a 80 y.o. female.     History of present illness  Diane is a pleasant 80-year-old seen in the office today at request of Dr. Frankel for significant pulmonary compromise for from her giant hiatal hernia.  He states that she has had reflux for quite a long time and so long as she takes her medicine she really does not have heartburn and is able to eat fairly well.  States however if she bends over she gets lots of regurgitation and has horrible chronic cough.  Dr. Frankel  thinks that fixing her hiatal hernia will help her breathing considerably.  Is been over a year since her last EGD so we have recommended she undergo an EGD with dilatation.  She is agreeable.    Past Medical History:   Diagnosis Date   • Acute bronchitis due to human metapneumovirus 2/8/2020   • Anxiety disorder    • Arthritis    • Breast cancer (CMS/[facility]) 02/2019    Invasive ductal carcinoma   • Chronic lymphocytic leukemia (CLL), B-cell (CMS/[facility]) 01/2019   • Depression    • Diverticulosis of colon 2018    Identified on colonoscopy   • Hemorrhoid    • Hiatal hernia with GERD    • Hyperlipidemia    • Hypertension    • Mycobacterium avium complex (CMS/[facility]) 02/2019    aspiration pneumonia; completed abx therapy   • LIBBY on CPAP     AHI 34.5       Past Surgical History:   Procedure Laterality Date   • BLADDER SURGERY     • BRONCHOSCOPY N/A 9/13/2019    Procedure: BRONCHOSCOPY with bronchial washing;  Surgeon: Marnie Frankel MD;  Location: [facility] ENDOSCOPY;  Service: Pulmonary   • COLONOSCOPY  07/19/2018    severe diverticulosis   • CYST REMOVAL      Removed a fatty cyst off of her back   • MASTECTOMY Right 02/04/2019    Invasive ductal carcinoma   • TUBAL ABDOMINAL LIGATION         Outpatient Encounter Medications as of 11/5/2020   Medication Sig Dispense Refill   • amLODIPine (NORVASC) 10 MG tablet Take 1 tablet by mouth Daily With Lunch. 90 tablet 1   • anastrozole (ARIMIDEX) 1 MG tablet Take 1 tablet by mouth Daily. 90  tablet 1   • cetirizine (zyrTEC) 10 MG tablet Take 10 mg by mouth Daily.     • chlorthalidone (HYGROTON) 25 MG tablet Take 1 tablet by mouth Daily. 90 tablet 1   • docusate sodium (COLACE) 100 MG capsule Take 100 mg by mouth 2 (Two) Times a Day.  3   • ferrous sulfate 324 (65 Fe) MG tablet delayed-release EC tablet Take 324 mg by mouth 3 (Three) Times a Week.     • ipratropium-albuterol (DUO-NEB) 0.5-2.5 mg/3 ml nebulizer Take 3 mL by nebulization Every 4 (Four) Hours. J 44.9 360 mL 0   • levothyroxine (SYNTHROID, LEVOTHROID) 100 MCG tablet Take 1 tablet by mouth Every Morning Before Breakfast. Take on empty stomach. 30 tablet 5   • losartan (COZAAR) 100 MG tablet Take 1 tablet by mouth Daily. 90 tablet 1   • metoclopramide (REGLAN) 5 MG tablet Take 1 tablet by mouth 3 (Three) Times a Day As Needed (for nausea and vomiting). 270 tablet 1   • mirtazapine (REMERON) 7.5 MG tablet Take 1 tablet by mouth Every Night. 90 tablet 1   • pantoprazole (Protonix) 40 MG EC tablet Take 1 tablet by mouth Every Morning Before Breakfast. Take on an empty stomach! 90 tablet 1   • rosuvastatin (CRESTOR) 10 MG tablet Take 1 tablet by mouth Every Night. 90 tablet 1   • sertraline (ZOLOFT) 100 MG tablet Take 1 tablet by mouth Every Night. 90 tablet 1   • sucralfate (CARAFATE) 1 g tablet Take 1 tablet by mouth 4 (Four) Times a Day Before Meals & at Bedtime. 360 tablet 1   • vitamin D (ERGOCALCIFEROL) 1.25 MG (77136 UT) capsule capsule Take 1 capsule by mouth Every 7 (Seven) Days. 12 capsule 1     No facility-administered encounter medications on file as of 11/5/2020.        No Known Allergies    Family History   Problem Relation Age of Onset   • Diabetes Mother    • Arthritis Other    • Heart disease Other        Social History     Socioeconomic History   • Marital status:      Spouse name: Not on file   • Number of children: Not on file   • Years of education: Not on file   • Highest education level: Not on file   Tobacco Use   •  Smoking status: Former Smoker   • Smokeless tobacco: Never Used   Substance and Sexual Activity   • Alcohol use: No     Frequency: Never   • Drug use: No   • Sexual activity: Defer       The following portions of the patient's history were reviewed and updated as appropriate: allergies, current medications, past family history, past medical history, past social history, past surgical history and problem list.    Objective       Assessment/Plan   There are no diagnoses linked to this encounter.    Complete review of systems is done and unremarkable except for the chief complaint and the above-noted exceptions.    Physical exam shows pleasant 80-year-old female.  HEENT is negative.  Heart regular.  Lungs clear.  Abdomen soft nontender without mass.  Extremities show equal range of motion in the upper and lower extremities.  She has symmetrical strength and usage.  Neuro shows no obvious focal deficit.    Impression: [place] 10-year-old with a very large hiatal hernia with pulmonary compromise    Plan: EGD with dilatation to assess suitability for surgery           Den Plummer DO  11/5/2020  15:11 EST

## 2020-11-20 ENCOUNTER — LAB (OUTPATIENT)
Dept: LAB | Facility: HOSPITAL | Age: 80
End: 2020-11-20

## 2020-11-20 PROCEDURE — C9803 HOPD COVID-19 SPEC COLLECT: HCPCS

## 2020-11-20 PROCEDURE — U0004 COV-19 TEST NON-CDC HGH THRU: HCPCS

## 2020-11-21 LAB — SARS-COV-2 RNA RESP QL NAA+PROBE: NOT DETECTED

## 2020-11-23 ENCOUNTER — ANESTHESIA EVENT (OUTPATIENT)
Dept: GASTROENTEROLOGY | Facility: HOSPITAL | Age: 80
End: 2020-11-23

## 2020-11-23 ENCOUNTER — ANESTHESIA (OUTPATIENT)
Dept: GASTROENTEROLOGY | Facility: HOSPITAL | Age: 80
End: 2020-11-23

## 2020-11-23 ENCOUNTER — HOSPITAL ENCOUNTER (OUTPATIENT)
Facility: HOSPITAL | Age: 80
Setting detail: HOSPITAL OUTPATIENT SURGERY
Discharge: HOME | End: 2020-11-23
Attending: SURGERY | Admitting: SURGERY

## 2020-11-23 VITALS
HEIGHT: 63 IN | RESPIRATION RATE: 20 BRPM | DIASTOLIC BLOOD PRESSURE: 67 MMHG | SYSTOLIC BLOOD PRESSURE: 139 MMHG | TEMPERATURE: 98.7 F | WEIGHT: 188.71 LBS | OXYGEN SATURATION: 94 % | HEART RATE: 91 BPM | BODY MASS INDEX: 33.44 KG/M2

## 2020-11-23 DIAGNOSIS — K21.9 HIATAL HERNIA WITH GASTROESOPHAGEAL REFLUX: Primary | ICD-10-CM

## 2020-11-23 DIAGNOSIS — K44.9 HIATAL HERNIA WITH GASTROESOPHAGEAL REFLUX: Primary | ICD-10-CM

## 2020-11-23 PROCEDURE — 25010000002 PROPOFOL 10 MG/ML EMULSION: Performed by: ANESTHESIOLOGY

## 2020-11-23 PROCEDURE — 43235 EGD DIAGNOSTIC BRUSH WASH: CPT | Performed by: SURGERY

## 2020-11-23 PROCEDURE — 43450 DILATE ESOPHAGUS 1/MULT PASS: CPT | Performed by: SURGERY

## 2020-11-23 RX ORDER — SODIUM CHLORIDE 0.9 % (FLUSH) 0.9 %
10 SYRINGE (ML) INJECTION EVERY 12 HOURS SCHEDULED
Status: DISCONTINUED | OUTPATIENT
Start: 2020-11-23 | End: 2020-11-23 | Stop reason: HOSPADM

## 2020-11-23 RX ORDER — SODIUM CHLORIDE 9 MG/ML
INJECTION, SOLUTION INTRAVENOUS CONTINUOUS PRN
Status: DISCONTINUED | OUTPATIENT
Start: 2020-11-23 | End: 2020-11-23 | Stop reason: SURG

## 2020-11-23 RX ORDER — LIDOCAINE HYDROCHLORIDE 10 MG/ML
INJECTION, SOLUTION EPIDURAL; INFILTRATION; INTRACAUDAL; PERINEURAL AS NEEDED
Status: DISCONTINUED | OUTPATIENT
Start: 2020-11-23 | End: 2020-11-23 | Stop reason: SURG

## 2020-11-23 RX ORDER — MIDAZOLAM HYDROCHLORIDE 1 MG/ML
1 INJECTION INTRAMUSCULAR; INTRAVENOUS
Status: DISCONTINUED | OUTPATIENT
Start: 2020-11-23 | End: 2020-11-23 | Stop reason: HOSPADM

## 2020-11-23 RX ORDER — SODIUM CHLORIDE 0.9 % (FLUSH) 0.9 %
10 SYRINGE (ML) INJECTION AS NEEDED
Status: DISCONTINUED | OUTPATIENT
Start: 2020-11-23 | End: 2020-11-23 | Stop reason: HOSPADM

## 2020-11-23 RX ORDER — PROPOFOL 10 MG/ML
VIAL (ML) INTRAVENOUS AS NEEDED
Status: DISCONTINUED | OUTPATIENT
Start: 2020-11-23 | End: 2020-11-23 | Stop reason: SURG

## 2020-11-23 RX ADMIN — LIDOCAINE HYDROCHLORIDE 50 MG: 10 INJECTION, SOLUTION EPIDURAL; INFILTRATION; INTRACAUDAL; PERINEURAL at 07:44

## 2020-11-23 RX ADMIN — GLYCOPYRROLATE 0.2 MG: 0.2 INJECTION, SOLUTION INTRAMUSCULAR; INTRAVITREAL at 07:42

## 2020-11-23 RX ADMIN — PROPOFOL 220 MG: 10 INJECTION, EMULSION INTRAVENOUS at 07:44

## 2020-11-23 RX ADMIN — SODIUM CHLORIDE: 0.9 INJECTION, SOLUTION INTRAVENOUS at 07:42

## 2020-11-23 NOTE — DISCHARGE INSTRUCTIONS
A responsible adult should stay with you and you should rest quietly for the rest of the day.    Do not drink alcohol, drive, operate any heavy machinery or power tools or make any legal/important decisions for the next 24 hours.     Progress your diet as tolerated.  If you begin to experience severe pain, increased shortness of breath, racing heartbeat or a fever above 101 F, seek immediate medical attention.     Follow up with MD as instructed. Dr. Quiroga office will call you regarding upcoming test.

## 2020-11-23 NOTE — ANESTHESIA POSTPROCEDURE EVALUATION
Patient: Diane Guan    Procedure Summary     Date: 11/23/20 Room / Location: [facility] ENDOSCOPY 4 / [facility] ENDOSCOPY    Anesthesia Start: 0743 Anesthesia Stop: 0758    Procedure: ESOPHAGOGASTRODUODENOSCOPY with dilatation (Bougie # 48, 50, 52, 54, 56, 58) (N/A ) Diagnosis:       Hiatal hernia with gastroesophageal reflux      (Hiatal hernia with gastroesophageal reflux [K21.9, K44.9])    Surgeon: Den Plummer DO Provider: Kami Mcdonald DO    Anesthesia Type: MAC ASA Status: 3          Anesthesia Type: MAC    Vitals  Vitals Value Taken Time   /67 11/23/20 0819   Temp     Pulse 91 11/23/20 0819   Resp 20 11/23/20 0819   SpO2 94 % 11/23/20 0819           Post Anesthesia Care and Evaluation    Patient location during evaluation: PACU  Patient participation: complete - patient participated  Level of consciousness: awake  Pain score: 0  Pain management: adequate  Airway patency: patent  Anesthetic complications: No anesthetic complications  PONV Status: none  Cardiovascular status: acceptable  Respiratory status: acceptable  Hydration status: acceptable

## 2020-11-23 NOTE — OP NOTE
ESOPHAGOGASTRODUODENOSCOPY  Procedure Report    Patient Name:  Diane Guan  YOB: 1940    Date of Surgery:  11/23/2020     Indications: Large hiatal hernia with pulmonary compromise and GERD    Pre-op Diagnosis:   Hiatal hernia with gastroesophageal reflux [K21.9, K44.9]       Post-Op Diagnosis Codes:     * Hiatal hernia with gastroesophageal reflux [K21.9, K44.9]    Procedure/CPT® Codes:      Procedure(s):  ESOPHAGOGASTRODUODENOSCOPY with dilatation (Bougie # 48, 50, 52, 54, 56, 58)    Staff:  Surgeon(s):  Den Plummer DO         Anesthesia: Monitored Anesthesia Care    Anesthetist: Dr Mcdonald    Estimated Blood Loss: none    Implants:    Nothing was implanted during the procedure    Specimen:          None        Findings: Large hiatal hernia with Km's ulcers, no bleeding, no Kate's    Complications: None    Description of Procedure: Diane is a 80-year-old female seen in the office at the request of Dr. Frankel for a large hiatal hernia causing some pulmonary compromise.  Patient has had large hiatal hernia for some time.  She has reflux.  However her biggest problem is difficulty catching a deep breath.  We have been asked to consider doing a laparoscopic hiatal hernia repair on her to try to help her breathing.  I was suggested that she needs an EGD first so we can assess the status of her stomach and esophagus and is for that reason she presents today.    Patient was taken to the endoscopy suite and placed in the left lateral decub position.  IV sedation is done by Dr Mcdonald.  Patient had is a hyperreactive airway and had lots of coughing then regurgitation causing coughing.  The Olympus upper pan video scope was passed by the cricopharyngeus into the esophagus and the esophagus was cleared of gastric secretions on the way down.  Diaphragmatic pinch was noted at 40 cm in the GE junction was noted at 34 to 35 cm.  The scope was passed into the stomach and on into the small bowel  eventually.  Duodenum was normal.  Scope was withdrawn back into the stomach and the exam was less than optimal due to all her coughing and straining.  We found no ulcer no tumor no polyp.  Scope was retroflexed upon itself viewing the EG junction.  She does have a large hiatal hernia.  She has some Km's ulcers in the upper stomach as well.  No other bleeding.  Scope was withdrawn back into the distal esophagus.  The Z-line is noted at about 34 to 35 cm from the incisors and is irregular consistent with reflux but no true Kate's disease.  Remainder the esophageal mucosa is visualized on the way out and is unremarkable.  We then sequentially dilated her from 48-56 Croatian.  She tolerated that portion well the scope was withdrawn no bleeding and occurred during the dilatation portion.  She was transferred back to her room in satisfactory condition.    Den Plummer,      Date: 11/23/2020  Time: 08:00 EST

## 2020-11-23 NOTE — NURSING NOTE
After multiple attempts to draw blood and start an IV, Dr. Plummer was contacted about ordered CMP an CBC. Dr. Plummer is ok with not drawing the labs if Dr. Mcdonald was ok with it. Dr. Mcdonald was ok with not drawing CBC/CMP and she was asked to look for IV access due to difficulty obtaining access by nursing staff. MD was able to stick the patient in her right ankle to obtain IV access.

## 2020-11-23 NOTE — ANESTHESIA PREPROCEDURE EVALUATION
Anesthesia Evaluation     Patient summary reviewed and Nursing notes reviewed   NPO Solid Status: > 6 hours  NPO Liquid Status: > 2 hours           Airway   Mallampati: III  TM distance: >3 FB  Neck ROM: full  Possible difficult intubation  Dental - normal exam   (+) partials    Pulmonary    (+) a smoker Former, sleep apnea, decreased breath sounds,     PE comment: Wet cough/copious secretions  Cardiovascular - normal exam    (+) hypertension 2 medications or greater, hyperlipidemia,       Neuro/Psych  (+) psychiatric history Anxiety and Depression,     GI/Hepatic/Renal/Endo    (+) obesity,  hiatal hernia, GERD,  diabetes mellitus type 2,     Musculoskeletal     Abdominal   (+) obese,    Substance History      OB/GYN          Other   arthritis, blood dyscrasia anemia,   history of cancer active                  Anesthesia Plan    ASA 3     MAC     intravenous induction     Anesthetic plan, all risks, benefits, and alternatives have been provided, discussed and informed consent has been obtained with: patient.

## 2020-11-24 DIAGNOSIS — Z87.19 H/O GASTROESOPHAGEAL REFLUX (GERD): ICD-10-CM

## 2020-11-24 DIAGNOSIS — K44.9 HIATAL HERNIA: Primary | ICD-10-CM

## 2020-11-30 ENCOUNTER — LAB (OUTPATIENT)
Dept: LAB | Facility: HOSPITAL | Age: 80
End: 2020-11-30

## 2020-11-30 DIAGNOSIS — Z87.19 H/O GASTROESOPHAGEAL REFLUX (GERD): ICD-10-CM

## 2020-11-30 DIAGNOSIS — K21.9 HIATAL HERNIA WITH GASTROESOPHAGEAL REFLUX: ICD-10-CM

## 2020-11-30 DIAGNOSIS — K44.9 HIATAL HERNIA WITH GASTROESOPHAGEAL REFLUX: ICD-10-CM

## 2020-11-30 DIAGNOSIS — K44.9 HIATAL HERNIA: ICD-10-CM

## 2020-11-30 PROCEDURE — C9803 HOPD COVID-19 SPEC COLLECT: HCPCS

## 2020-11-30 PROCEDURE — 85025 COMPLETE CBC W/AUTO DIFF WBC: CPT

## 2020-11-30 PROCEDURE — U0004 COV-19 TEST NON-CDC HGH THRU: HCPCS

## 2020-11-30 PROCEDURE — 80053 COMPREHEN METABOLIC PANEL: CPT

## 2020-11-30 PROCEDURE — 85007 BL SMEAR W/DIFF WBC COUNT: CPT

## 2020-11-30 PROCEDURE — 36415 COLL VENOUS BLD VENIPUNCTURE: CPT

## 2020-12-01 LAB
ALBUMIN SERPL-MCNC: 4.4 G/DL (ref 3.5–5.2)
ALBUMIN/GLOB SERPL: 1.8 G/DL
ALP SERPL-CCNC: 124 U/L (ref 39–117)
ALT SERPL W P-5'-P-CCNC: 13 U/L (ref 1–33)
ANION GAP SERPL CALCULATED.3IONS-SCNC: 12.4 MMOL/L (ref 5–15)
ANISOCYTOSIS BLD QL: ABNORMAL
AST SERPL-CCNC: 19 U/L (ref 1–32)
BILIRUB SERPL-MCNC: 0.2 MG/DL (ref 0–1.2)
BUN SERPL-MCNC: 25 MG/DL (ref 8–23)
BUN/CREAT SERPL: 21.4 (ref 7–25)
CALCIUM SPEC-SCNC: 9.9 MG/DL (ref 8.6–10.5)
CHLORIDE SERPL-SCNC: 106 MMOL/L (ref 98–107)
CO2 SERPL-SCNC: 24.6 MMOL/L (ref 22–29)
CREAT SERPL-MCNC: 1.17 MG/DL (ref 0.57–1)
DEPRECATED RDW RBC AUTO: 40.4 FL (ref 37–54)
EOSINOPHIL # BLD MANUAL: 0.32 10*3/MM3 (ref 0–0.4)
EOSINOPHIL NFR BLD MANUAL: 1.1 % (ref 0.3–6.2)
ERYTHROCYTE [DISTWIDTH] IN BLOOD BY AUTOMATED COUNT: 13.9 % (ref 12.3–15.4)
GFR SERPL CREATININE-BSD FRML MDRD: 45 ML/MIN/1.73
GLOBULIN UR ELPH-MCNC: 2.5 GM/DL
GLUCOSE SERPL-MCNC: 111 MG/DL (ref 65–99)
HCT VFR BLD AUTO: 36.9 % (ref 34–46.6)
HGB BLD-MCNC: 11.6 G/DL (ref 12–15.9)
LYMPHOCYTES # BLD MANUAL: 18.78 10*3/MM3 (ref 0.7–3.1)
LYMPHOCYTES NFR BLD MANUAL: 3.4 % (ref 5–12)
LYMPHOCYTES NFR BLD MANUAL: 64.4 % (ref 19.6–45.3)
MCH RBC QN AUTO: 25.7 PG (ref 26.6–33)
MCHC RBC AUTO-ENTMCNC: 31.4 G/DL (ref 31.5–35.7)
MCV RBC AUTO: 81.8 FL (ref 79–97)
MONOCYTES # BLD AUTO: 0.99 10*3/MM3 (ref 0.1–0.9)
NEUTROPHILS # BLD AUTO: 9.04 10*3/MM3 (ref 1.7–7)
NEUTROPHILS NFR BLD MANUAL: 31 % (ref 42.7–76)
PLAT MORPH BLD: NORMAL
PLATELET # BLD AUTO: 226 10*3/MM3 (ref 140–450)
PMV BLD AUTO: 12.9 FL (ref 6–12)
POIKILOCYTOSIS BLD QL SMEAR: ABNORMAL
POTASSIUM SERPL-SCNC: 4.8 MMOL/L (ref 3.5–5.2)
PROT SERPL-MCNC: 6.9 G/DL (ref 6–8.5)
RBC # BLD AUTO: 4.51 10*6/MM3 (ref 3.77–5.28)
SMUDGE CELLS BLD QL SMEAR: ABNORMAL
SODIUM SERPL-SCNC: 143 MMOL/L (ref 136–145)
WBC # BLD AUTO: 29.16 10*3/MM3 (ref 3.4–10.8)

## 2020-12-02 ENCOUNTER — HOSPITAL ENCOUNTER (OUTPATIENT)
Dept: GENERAL RADIOLOGY | Facility: HOSPITAL | Age: 80
Discharge: HOME | End: 2020-12-02
Admitting: SURGERY

## 2020-12-02 DIAGNOSIS — K21.9 HIATAL HERNIA WITH GASTROESOPHAGEAL REFLUX: ICD-10-CM

## 2020-12-02 DIAGNOSIS — K44.9 HIATAL HERNIA WITH GASTROESOPHAGEAL REFLUX: ICD-10-CM

## 2020-12-02 LAB — SARS-COV-2 RNA RESP QL NAA+PROBE: NOT DETECTED

## 2020-12-02 PROCEDURE — 74220 X-RAY XM ESOPHAGUS 1CNTRST: CPT

## 2020-12-03 ENCOUNTER — OFFICE VISIT (OUTPATIENT)
Dept: SURGERY | Facility: CLINIC | Age: 80
End: 2020-12-03

## 2020-12-03 ENCOUNTER — PREP FOR SURGERY (OUTPATIENT)
Dept: OTHER | Facility: HOSPITAL | Age: 80
End: 2020-12-03

## 2020-12-03 VITALS
DIASTOLIC BLOOD PRESSURE: 81 MMHG | OXYGEN SATURATION: 99 % | HEART RATE: 72 BPM | WEIGHT: 189 LBS | TEMPERATURE: 97.8 F | HEIGHT: 63 IN | SYSTOLIC BLOOD PRESSURE: 149 MMHG | BODY MASS INDEX: 33.49 KG/M2

## 2020-12-03 DIAGNOSIS — K21.9 HIATAL HERNIA WITH GASTROESOPHAGEAL REFLUX: Primary | ICD-10-CM

## 2020-12-03 DIAGNOSIS — K44.9 HIATAL HERNIA WITH GASTROESOPHAGEAL REFLUX: Primary | ICD-10-CM

## 2020-12-03 PROCEDURE — 99213 OFFICE O/P EST LOW 20 MIN: CPT | Performed by: SURGERY

## 2020-12-03 RX ORDER — SODIUM CHLORIDE 9 MG/ML
100 INJECTION, SOLUTION INTRAVENOUS CONTINUOUS
Status: CANCELLED | OUTPATIENT
Start: 2020-12-03

## 2020-12-03 NOTE — PROGRESS NOTES
Susana Guan is a 80 y.o. female.     History of present illness  This is seen in the office today along with her daughter to discuss the results of her recent EGD and barium swallow.  EGD showed her to have a large hiatal hernia with diaphragmatic pinch at 40 cm and the Z-line at about 34 cm.  There was no Kate's disease no ulcer or tumor.  The barium swallow showed her to have a large hiatal hernia with high-grade reflux and no secondary peristaltic waves.  So she has significant esophageal dysmotility and a Nissen would be contraindicated in her.  I explained that even doing transoral fundoplication she may have problems eating.    As her primary problem is breathing I think her best bet is to repair the hernia and do a gastropexy to keep the stomach from getting back up in the chest.  She does not mind taking medicine for her reflux and so long as she takes it she gets along well from that.  If her esophagus had better motility then we would do a transoral fundoplication.  But I think even that would be problematic for her    Past Medical History:   Diagnosis Date   • Acute bronchitis due to human metapneumovirus 2/8/2020   • Anxiety disorder    • Arthritis    • Breast cancer (CMS/[facility]) 02/2019    Invasive ductal carcinoma   • Chronic lymphocytic leukemia (CLL), B-cell (CMS/[facility]) 01/2019   • Depression    • Diverticulosis of colon 2018    Identified on colonoscopy   • Hemorrhoid    • Hiatal hernia    • Hiatal hernia with GERD    • Hyperlipidemia    • Hypertension    • Mycobacterium avium complex (CMS/[facility]) 02/2019    aspiration pneumonia; completed abx therapy   • LIBBY on CPAP     AHI 34.5       Past Surgical History:   Procedure Laterality Date   • BLADDER SURGERY     • BRONCHOSCOPY N/A 9/13/2019    Procedure: BRONCHOSCOPY with bronchial washing;  Surgeon: Marnie Frankel MD;  Location: [facility] ENDOSCOPY;  Service: Pulmonary   • COLONOSCOPY  07/19/2018    severe diverticulosis   • CYST REMOVAL       Removed a fatty cyst off of her back   • ENDOSCOPY N/A 11/23/2020    Procedure: ESOPHAGOGASTRODUODENOSCOPY with dilatation (Bougie # 48, 50, 52, 54, 56, 58);  Surgeon: Den Plummer DO;  Location: [facility] ENDOSCOPY;  Service: General;  Laterality: N/A;  Post: large hiatal hernia, joshua's ulcers   • MASTECTOMY Right 02/04/2019    Invasive ductal carcinoma   • TUBAL ABDOMINAL LIGATION         Outpatient Encounter Medications as of 12/3/2020   Medication Sig Dispense Refill   • amLODIPine (NORVASC) 10 MG tablet Take 1 tablet by mouth Daily With Lunch. 90 tablet 1   • anastrozole (ARIMIDEX) 1 MG tablet Take 1 tablet by mouth Daily. 90 tablet 1   • cetirizine (zyrTEC) 10 MG tablet Take 10 mg by mouth Daily.     • chlorthalidone (HYGROTON) 25 MG tablet Take 1 tablet by mouth Daily. 90 tablet 1   • docusate sodium (COLACE) 100 MG capsule Take 100 mg by mouth 2 (Two) Times a Day.  3   • ferrous sulfate 324 (65 Fe) MG tablet delayed-release EC tablet Take 324 mg by mouth 3 (Three) Times a Week.     • ipratropium-albuterol (DUO-NEB) 0.5-2.5 mg/3 ml nebulizer Take 3 mL by nebulization Every 4 (Four) Hours. J 44.9 360 mL 0   • levothyroxine (SYNTHROID, LEVOTHROID) 100 MCG tablet Take 1 tablet by mouth Every Morning Before Breakfast. Take on empty stomach. 30 tablet 5   • losartan (COZAAR) 100 MG tablet Take 1 tablet by mouth Daily. 90 tablet 1   • metoclopramide (REGLAN) 5 MG tablet Take 1 tablet by mouth 3 (Three) Times a Day As Needed (for nausea and vomiting). 270 tablet 1   • mirtazapine (REMERON) 7.5 MG tablet Take 1 tablet by mouth Every Night. 90 tablet 1   • pantoprazole (Protonix) 40 MG EC tablet Take 1 tablet by mouth Every Morning Before Breakfast. Take on an empty stomach! 90 tablet 1   • rosuvastatin (CRESTOR) 10 MG tablet Take 1 tablet by mouth Every Night. 90 tablet 1   • sertraline (ZOLOFT) 100 MG tablet Take 1 tablet by mouth Every Night. 90 tablet 1   • sucralfate (CARAFATE) 1 g tablet Take 1 tablet by  mouth 4 (Four) Times a Day Before Meals & at Bedtime. 360 tablet 1   • vitamin D (ERGOCALCIFEROL) 1.25 MG (19921 UT) capsule capsule Take 1 capsule by mouth Every 7 (Seven) Days. 12 capsule 1     No facility-administered encounter medications on file as of 12/3/2020.        No Known Allergies    Family History   Problem Relation Age of Onset   • Diabetes Mother    • Arthritis Other    • Heart disease Other        Social History     Socioeconomic History   • Marital status:      Spouse name: Not on file   • Number of children: Not on file   • Years of education: Not on file   • Highest education level: Not on file   Tobacco Use   • Smoking status: Former Smoker   • Smokeless tobacco: Never Used   Substance and Sexual Activity   • Alcohol use: No     Frequency: Never   • Drug use: No   • Sexual activity: Defer       The following portions of the patient's history were reviewed and updated as appropriate: allergies, current medications, past family history, past medical history, past social history, past surgical history and problem list.    Objective       Assessment/Plan   There are no diagnoses linked to this encounter.    Complete review of systems is done and unremarkable with exception the chief complaint.    Physical exam shows pleasant 80-year-old female.  HEENT is negative.  Heart regular.  Lungs clear.  Abdomen soft nontender without mass or scar.  Extremities with equal range of motion and usage.  Neuro with no obvious focal deficit.    Impression: 80-year-old with esophageal dysmotility and large hiatal hernia with pulmonary compromise    Recommendation: Laparoscopic hiatal hernia repair with gastropexy           Den Plummer DO  12/3/2020  14:38 EST

## 2020-12-03 NOTE — H&P
Susana Guan is a 80 y.o. female.     History of present illness  This is seen in the office today along with her daughter to discuss the results of her recent EGD and barium swallow.  EGD showed her to have a large hiatal hernia with diaphragmatic pinch at 40 cm and the Z-line at about 34 cm.  There was no Kate's disease no ulcer or tumor.  The barium swallow showed her to have a large hiatal hernia with high-grade reflux and no secondary peristaltic waves.  So she has significant esophageal dysmotility and a Nissen would be contraindicated in her.  I explained that even doing transoral fundoplication she may have problems eating.    As her primary problem is breathing I think her best bet is to repair the hernia and do a gastropexy to keep the stomach from getting back up in the chest.  She does not mind taking medicine for her reflux and so long as she takes it she gets along well from that.  If her esophagus had better motility then we would do a transoral fundoplication.  But I think even that would be problematic for her    Past Medical History:   Diagnosis Date   • Acute bronchitis due to human metapneumovirus 2/8/2020   • Anxiety disorder    • Arthritis    • Breast cancer (CMS/[facility]) 02/2019    Invasive ductal carcinoma   • Chronic lymphocytic leukemia (CLL), B-cell (CMS/[facility]) 01/2019   • Depression    • Diverticulosis of colon 2018    Identified on colonoscopy   • Hemorrhoid    • Hiatal hernia    • Hiatal hernia with GERD    • Hyperlipidemia    • Hypertension    • Mycobacterium avium complex (CMS/[facility]) 02/2019    aspiration pneumonia; completed abx therapy   • LIBBY on CPAP     AHI 34.5       Past Surgical History:   Procedure Laterality Date   • BLADDER SURGERY     • BRONCHOSCOPY N/A 9/13/2019    Procedure: BRONCHOSCOPY with bronchial washing;  Surgeon: Marnie Frankel MD;  Location: [facility] ENDOSCOPY;  Service: Pulmonary   • COLONOSCOPY  07/19/2018    severe diverticulosis   • CYST REMOVAL       Removed a fatty cyst off of her back   • ENDOSCOPY N/A 11/23/2020    Procedure: ESOPHAGOGASTRODUODENOSCOPY with dilatation (Bougie # 48, 50, 52, 54, 56, 58);  Surgeon: Den Plummer DO;  Location: [facility] ENDOSCOPY;  Service: General;  Laterality: N/A;  Post: large hiatal hernia, joshua's ulcers   • MASTECTOMY Right 02/04/2019    Invasive ductal carcinoma   • TUBAL ABDOMINAL LIGATION         Outpatient Encounter Medications as of 12/3/2020   Medication Sig Dispense Refill   • amLODIPine (NORVASC) 10 MG tablet Take 1 tablet by mouth Daily With Lunch. 90 tablet 1   • anastrozole (ARIMIDEX) 1 MG tablet Take 1 tablet by mouth Daily. 90 tablet 1   • cetirizine (zyrTEC) 10 MG tablet Take 10 mg by mouth Daily.     • chlorthalidone (HYGROTON) 25 MG tablet Take 1 tablet by mouth Daily. 90 tablet 1   • docusate sodium (COLACE) 100 MG capsule Take 100 mg by mouth 2 (Two) Times a Day.  3   • ferrous sulfate 324 (65 Fe) MG tablet delayed-release EC tablet Take 324 mg by mouth 3 (Three) Times a Week.     • ipratropium-albuterol (DUO-NEB) 0.5-2.5 mg/3 ml nebulizer Take 3 mL by nebulization Every 4 (Four) Hours. J 44.9 360 mL 0   • levothyroxine (SYNTHROID, LEVOTHROID) 100 MCG tablet Take 1 tablet by mouth Every Morning Before Breakfast. Take on empty stomach. 30 tablet 5   • losartan (COZAAR) 100 MG tablet Take 1 tablet by mouth Daily. 90 tablet 1   • metoclopramide (REGLAN) 5 MG tablet Take 1 tablet by mouth 3 (Three) Times a Day As Needed (for nausea and vomiting). 270 tablet 1   • mirtazapine (REMERON) 7.5 MG tablet Take 1 tablet by mouth Every Night. 90 tablet 1   • pantoprazole (Protonix) 40 MG EC tablet Take 1 tablet by mouth Every Morning Before Breakfast. Take on an empty stomach! 90 tablet 1   • rosuvastatin (CRESTOR) 10 MG tablet Take 1 tablet by mouth Every Night. 90 tablet 1   • sertraline (ZOLOFT) 100 MG tablet Take 1 tablet by mouth Every Night. 90 tablet 1   • sucralfate (CARAFATE) 1 g tablet Take 1 tablet by  mouth 4 (Four) Times a Day Before Meals & at Bedtime. 360 tablet 1   • vitamin D (ERGOCALCIFEROL) 1.25 MG (71903 UT) capsule capsule Take 1 capsule by mouth Every 7 (Seven) Days. 12 capsule 1     No facility-administered encounter medications on file as of 12/3/2020.        No Known Allergies    Family History   Problem Relation Age of Onset   • Diabetes Mother    • Arthritis Other    • Heart disease Other        Social History     Socioeconomic History   • Marital status:      Spouse name: Not on file   • Number of children: Not on file   • Years of education: Not on file   • Highest education level: Not on file   Tobacco Use   • Smoking status: Former Smoker   • Smokeless tobacco: Never Used   Substance and Sexual Activity   • Alcohol use: No     Frequency: Never   • Drug use: No   • Sexual activity: Defer       The following portions of the patient's history were reviewed and updated as appropriate: allergies, current medications, past family history, past medical history, past social history, past surgical history and problem list.    Objective       Assessment/Plan   There are no diagnoses linked to this encounter.    Complete review of systems is done and unremarkable with exception the chief complaint.    Physical exam shows pleasant 80-year-old female.  HEENT is negative.  Heart regular.  Lungs clear.  Abdomen soft nontender without mass or scar.  Extremities with equal range of motion and usage.  Neuro with no obvious focal deficit.    Impression: 80-year-old with esophageal dysmotility and large hiatal hernia with pulmonary compromise    Recommendation: Laparoscopic hiatal hernia repair with gastropexy           Den Plummer DO  12/3/2020  14:41 EST

## 2020-12-18 ENCOUNTER — LAB (OUTPATIENT)
Dept: LAB | Facility: HOSPITAL | Age: 80
End: 2020-12-18

## 2020-12-18 ENCOUNTER — HOSPITAL ENCOUNTER (OUTPATIENT)
Dept: GENERAL RADIOLOGY | Facility: HOSPITAL | Age: 80
Discharge: HOME | End: 2020-12-18

## 2020-12-18 ENCOUNTER — HOSPITAL ENCOUNTER (OUTPATIENT)
Dept: CARDIOLOGY | Facility: HOSPITAL | Age: 80
Discharge: HOME | End: 2020-12-18

## 2020-12-18 DIAGNOSIS — K21.9 HIATAL HERNIA WITH GASTROESOPHAGEAL REFLUX: ICD-10-CM

## 2020-12-18 DIAGNOSIS — K44.9 HIATAL HERNIA WITH GASTROESOPHAGEAL REFLUX: ICD-10-CM

## 2020-12-18 LAB
ABO GROUP BLD: NORMAL
ALBUMIN SERPL-MCNC: 4.4 G/DL (ref 3.5–5.2)
ALBUMIN/GLOB SERPL: 1.7 G/DL
ALP SERPL-CCNC: 140 U/L (ref 39–117)
ALT SERPL W P-5'-P-CCNC: 11 U/L (ref 1–33)
ANION GAP SERPL CALCULATED.3IONS-SCNC: 12.5 MMOL/L (ref 5–15)
APTT PPP: 23.6 SECONDS (ref 24–31)
AST SERPL-CCNC: 15 U/L (ref 1–32)
BILIRUB SERPL-MCNC: 0.3 MG/DL (ref 0–1.2)
BLD GP AB SCN SERPL QL: NEGATIVE
BUN SERPL-MCNC: 24 MG/DL (ref 8–23)
BUN/CREAT SERPL: 20 (ref 7–25)
CALCIUM SPEC-SCNC: 9 MG/DL (ref 8.6–10.5)
CHLORIDE SERPL-SCNC: 103 MMOL/L (ref 98–107)
CO2 SERPL-SCNC: 25.5 MMOL/L (ref 22–29)
CREAT SERPL-MCNC: 1.2 MG/DL (ref 0.57–1)
DEPRECATED RDW RBC AUTO: 39.3 FL (ref 37–54)
EOSINOPHIL # BLD MANUAL: 0.5 10*3/MM3 (ref 0–0.4)
EOSINOPHIL NFR BLD MANUAL: 2 % (ref 0.3–6.2)
ERYTHROCYTE [DISTWIDTH] IN BLOOD BY AUTOMATED COUNT: 13.6 % (ref 12.3–15.4)
GFR SERPL CREATININE-BSD FRML MDRD: 43 ML/MIN/1.73
GLOBULIN UR ELPH-MCNC: 2.6 GM/DL
GLUCOSE SERPL-MCNC: 118 MG/DL (ref 65–99)
HCT VFR BLD AUTO: 36.3 % (ref 34–46.6)
HGB BLD-MCNC: 11.6 G/DL (ref 12–15.9)
INR PPP: 0.97 (ref 0.93–1.1)
LYMPHOCYTES # BLD MANUAL: 17.71 10*3/MM3 (ref 0.7–3.1)
LYMPHOCYTES NFR BLD MANUAL: 3 % (ref 5–12)
LYMPHOCYTES NFR BLD MANUAL: 71 % (ref 19.6–45.3)
MCH RBC QN AUTO: 25.8 PG (ref 26.6–33)
MCHC RBC AUTO-ENTMCNC: 32 G/DL (ref 31.5–35.7)
MCV RBC AUTO: 80.8 FL (ref 79–97)
MONOCYTES # BLD AUTO: 0.75 10*3/MM3 (ref 0.1–0.9)
NEUTROPHILS # BLD AUTO: 5.99 10*3/MM3 (ref 1.7–7)
NEUTROPHILS NFR BLD MANUAL: 24 % (ref 42.7–76)
PLAT MORPH BLD: NORMAL
PLATELET # BLD AUTO: 212 10*3/MM3 (ref 140–450)
PMV BLD AUTO: 12.8 FL (ref 6–12)
POTASSIUM SERPL-SCNC: 4.2 MMOL/L (ref 3.5–5.2)
PROT SERPL-MCNC: 7 G/DL (ref 6–8.5)
PROTHROMBIN TIME: 10.7 SECONDS (ref 9.6–11.7)
RBC # BLD AUTO: 4.49 10*6/MM3 (ref 3.77–5.28)
RBC MORPH BLD: NORMAL
RH BLD: POSITIVE
SODIUM SERPL-SCNC: 141 MMOL/L (ref 136–145)
T&S EXPIRATION DATE: NORMAL
WBC # BLD AUTO: 24.95 10*3/MM3 (ref 3.4–10.8)
WBC MORPH BLD: NORMAL

## 2020-12-18 PROCEDURE — 36415 COLL VENOUS BLD VENIPUNCTURE: CPT

## 2020-12-18 PROCEDURE — 85730 THROMBOPLASTIN TIME PARTIAL: CPT

## 2020-12-18 PROCEDURE — 85610 PROTHROMBIN TIME: CPT

## 2020-12-18 PROCEDURE — 85007 BL SMEAR W/DIFF WBC COUNT: CPT

## 2020-12-18 PROCEDURE — 86850 RBC ANTIBODY SCREEN: CPT

## 2020-12-18 PROCEDURE — 71046 X-RAY EXAM CHEST 2 VIEWS: CPT

## 2020-12-18 PROCEDURE — 93010 ELECTROCARDIOGRAM REPORT: CPT | Performed by: INTERNAL MEDICINE

## 2020-12-18 PROCEDURE — 85025 COMPLETE CBC W/AUTO DIFF WBC: CPT

## 2020-12-18 PROCEDURE — 93005 ELECTROCARDIOGRAM TRACING: CPT | Performed by: SURGERY

## 2020-12-18 PROCEDURE — 86901 BLOOD TYPING SEROLOGIC RH(D): CPT

## 2020-12-18 PROCEDURE — 86900 BLOOD TYPING SEROLOGIC ABO: CPT

## 2020-12-18 PROCEDURE — 80053 COMPREHEN METABOLIC PANEL: CPT

## 2020-12-22 LAB — QT INTERVAL: 381 MS

## 2020-12-28 ENCOUNTER — LAB (OUTPATIENT)
Dept: LAB | Facility: HOSPITAL | Age: 80
End: 2020-12-28

## 2020-12-28 DIAGNOSIS — Z99.89 OSA ON CPAP: Primary | ICD-10-CM

## 2020-12-28 DIAGNOSIS — G47.33 OSA ON CPAP: Primary | ICD-10-CM

## 2020-12-28 DIAGNOSIS — E78.2 MIXED HYPERLIPIDEMIA: ICD-10-CM

## 2020-12-28 DIAGNOSIS — K82.9 GALLBLADDER DISORDER: ICD-10-CM

## 2020-12-28 DIAGNOSIS — E55.9 VITAMIN D DEFICIENCY: ICD-10-CM

## 2020-12-28 LAB — SARS-COV-2 ORF1AB RESP QL NAA+PROBE: NOT DETECTED

## 2020-12-28 PROCEDURE — C9803 HOPD COVID-19 SPEC COLLECT: HCPCS

## 2020-12-28 PROCEDURE — U0004 COV-19 TEST NON-CDC HGH THRU: HCPCS

## 2020-12-29 ENCOUNTER — ANESTHESIA EVENT (OUTPATIENT)
Dept: PERIOP | Facility: HOSPITAL | Age: 80
End: 2020-12-29

## 2020-12-30 ENCOUNTER — ANESTHESIA (OUTPATIENT)
Dept: PERIOP | Facility: HOSPITAL | Age: 80
End: 2020-12-30

## 2020-12-30 ENCOUNTER — HOSPITAL ENCOUNTER (INPATIENT)
Facility: HOSPITAL | Age: 80
LOS: 13 days | Discharge: HOME | End: 2021-01-13
Attending: SURGERY | Admitting: SURGERY

## 2020-12-30 DIAGNOSIS — K44.9 HIATAL HERNIA WITH GASTROESOPHAGEAL REFLUX: ICD-10-CM

## 2020-12-30 DIAGNOSIS — K21.9 HIATAL HERNIA WITH GASTROESOPHAGEAL REFLUX: ICD-10-CM

## 2020-12-30 LAB
ABO GROUP BLD: NORMAL
BLD GP AB SCN SERPL QL: NEGATIVE
GLUCOSE BLDC GLUCOMTR-MCNC: 152 MG/DL (ref 70–105)
RH BLD: POSITIVE
T&S EXPIRATION DATE: NORMAL

## 2020-12-30 PROCEDURE — 94799 UNLISTED PULMONARY SVC/PX: CPT

## 2020-12-30 PROCEDURE — 25010000002 LORAZEPAM PER 2 MG: Performed by: NURSE ANESTHETIST, CERTIFIED REGISTERED

## 2020-12-30 PROCEDURE — 82962 GLUCOSE BLOOD TEST: CPT

## 2020-12-30 PROCEDURE — 86900 BLOOD TYPING SEROLOGIC ABO: CPT | Performed by: SURGERY

## 2020-12-30 PROCEDURE — 25010000002 FENTANYL CITRATE (PF) 100 MCG/2ML SOLUTION: Performed by: NURSE ANESTHETIST, CERTIFIED REGISTERED

## 2020-12-30 PROCEDURE — G0378 HOSPITAL OBSERVATION PER HR: HCPCS

## 2020-12-30 PROCEDURE — 25010000002 PROPOFOL 10 MG/ML EMULSION: Performed by: NURSE ANESTHETIST, CERTIFIED REGISTERED

## 2020-12-30 PROCEDURE — 94664 DEMO&/EVAL PT USE INHALER: CPT

## 2020-12-30 PROCEDURE — 0DS64ZZ REPOSITION STOMACH, PERCUTANEOUS ENDOSCOPIC APPROACH: ICD-10-PCS | Performed by: SURGERY

## 2020-12-30 PROCEDURE — 25010000002 HYDROMORPHONE PER 4 MG: Performed by: SURGERY

## 2020-12-30 PROCEDURE — 43280 LAPAROSCOPY FUNDOPLASTY: CPT | Performed by: REGISTERED NURSE

## 2020-12-30 PROCEDURE — 25010000003 CEFAZOLIN PER 500 MG: Performed by: SURGERY

## 2020-12-30 PROCEDURE — 25010000002 ONDANSETRON PER 1 MG: Performed by: NURSE ANESTHETIST, CERTIFIED REGISTERED

## 2020-12-30 PROCEDURE — 86901 BLOOD TYPING SEROLOGIC RH(D): CPT | Performed by: SURGERY

## 2020-12-30 PROCEDURE — 93010 ELECTROCARDIOGRAM REPORT: CPT | Performed by: INTERNAL MEDICINE

## 2020-12-30 PROCEDURE — 99222 1ST HOSP IP/OBS MODERATE 55: CPT | Performed by: INTERNAL MEDICINE

## 2020-12-30 PROCEDURE — 93005 ELECTROCARDIOGRAM TRACING: CPT | Performed by: ANESTHESIOLOGY

## 2020-12-30 PROCEDURE — 25010000002 DEXAMETHASONE PER 1 MG: Performed by: NURSE ANESTHETIST, CERTIFIED REGISTERED

## 2020-12-30 PROCEDURE — 94640 AIRWAY INHALATION TREATMENT: CPT

## 2020-12-30 PROCEDURE — 0BQT4ZZ REPAIR DIAPHRAGM, PERCUTANEOUS ENDOSCOPIC APPROACH: ICD-10-PCS | Performed by: SURGERY

## 2020-12-30 PROCEDURE — 86850 RBC ANTIBODY SCREEN: CPT | Performed by: SURGERY

## 2020-12-30 PROCEDURE — 25010000002 MORPHINE PER 10 MG: Performed by: NURSE ANESTHETIST, CERTIFIED REGISTERED

## 2020-12-30 PROCEDURE — 25010000002 HYDROMORPHONE PER 4 MG: Performed by: NURSE ANESTHETIST, CERTIFIED REGISTERED

## 2020-12-30 PROCEDURE — 43280 LAPAROSCOPY FUNDOPLASTY: CPT | Performed by: SURGERY

## 2020-12-30 RX ORDER — NEOSTIGMINE METHYLSULFATE 5 MG/5 ML
SYRINGE (ML) INTRAVENOUS AS NEEDED
Status: DISCONTINUED | OUTPATIENT
Start: 2020-12-30 | End: 2020-12-30 | Stop reason: SURG

## 2020-12-30 RX ORDER — PROMETHAZINE HYDROCHLORIDE 12.5 MG/1
12.5 TABLET ORAL EVERY 6 HOURS PRN
Status: DISCONTINUED | OUTPATIENT
Start: 2020-12-30 | End: 2021-01-13 | Stop reason: HOSPADM

## 2020-12-30 RX ORDER — LORAZEPAM 2 MG/ML
0.5 INJECTION INTRAMUSCULAR ONCE
Status: COMPLETED | OUTPATIENT
Start: 2020-12-30 | End: 2020-12-30

## 2020-12-30 RX ORDER — SODIUM CHLORIDE 9 MG/ML
100 INJECTION, SOLUTION INTRAVENOUS CONTINUOUS
Status: DISCONTINUED | OUTPATIENT
Start: 2020-12-30 | End: 2021-01-01

## 2020-12-30 RX ORDER — FENTANYL CITRATE 50 UG/ML
INJECTION, SOLUTION INTRAMUSCULAR; INTRAVENOUS AS NEEDED
Status: DISCONTINUED | OUTPATIENT
Start: 2020-12-30 | End: 2020-12-30 | Stop reason: SURG

## 2020-12-30 RX ORDER — ACETAMINOPHEN 650 MG/1
650 SUPPOSITORY RECTAL ONCE AS NEEDED
Status: DISCONTINUED | OUTPATIENT
Start: 2020-12-30 | End: 2020-12-30 | Stop reason: HOSPADM

## 2020-12-30 RX ORDER — SERTRALINE HYDROCHLORIDE 100 MG/1
100 TABLET, FILM COATED ORAL NIGHTLY
Status: DISCONTINUED | OUTPATIENT
Start: 2020-12-30 | End: 2021-01-01

## 2020-12-30 RX ORDER — ONDANSETRON 4 MG/1
4 TABLET, FILM COATED ORAL EVERY 6 HOURS PRN
Status: DISCONTINUED | OUTPATIENT
Start: 2020-12-30 | End: 2021-01-13 | Stop reason: HOSPADM

## 2020-12-30 RX ORDER — PHENYLEPHRINE HCL IN 0.9% NACL 0.5 MG/5ML
.5-3 SYRINGE (ML) INTRAVENOUS CONTINUOUS PRN
Status: DISCONTINUED | OUTPATIENT
Start: 2020-12-30 | End: 2021-01-13 | Stop reason: HOSPADM

## 2020-12-30 RX ORDER — FERROUS SULFATE TAB EC 324 MG (65 MG FE EQUIVALENT) 324 (65 FE) MG
324 TABLET DELAYED RESPONSE ORAL
Status: DISCONTINUED | OUTPATIENT
Start: 2020-12-31 | End: 2021-01-02

## 2020-12-30 RX ORDER — GLYCOPYRROLATE 1 MG/5 ML
SYRINGE (ML) INTRAVENOUS AS NEEDED
Status: DISCONTINUED | OUTPATIENT
Start: 2020-12-30 | End: 2020-12-30 | Stop reason: SURG

## 2020-12-30 RX ORDER — AMLODIPINE BESYLATE 5 MG/1
10 TABLET ORAL
Status: DISCONTINUED | OUTPATIENT
Start: 2020-12-30 | End: 2021-01-13 | Stop reason: HOSPADM

## 2020-12-30 RX ORDER — SUCRALFATE 1 G/1
1 TABLET ORAL
Status: DISCONTINUED | OUTPATIENT
Start: 2020-12-30 | End: 2021-01-02

## 2020-12-30 RX ORDER — SODIUM CHLORIDE 0.9 % (FLUSH) 0.9 %
10 SYRINGE (ML) INJECTION EVERY 12 HOURS SCHEDULED
Status: DISCONTINUED | OUTPATIENT
Start: 2020-12-30 | End: 2020-12-30 | Stop reason: HOSPADM

## 2020-12-30 RX ORDER — HYDROMORPHONE HCL 110MG/55ML
0.5 PATIENT CONTROLLED ANALGESIA SYRINGE INTRAVENOUS
Status: DISCONTINUED | OUTPATIENT
Start: 2020-12-30 | End: 2020-12-30 | Stop reason: HOSPADM

## 2020-12-30 RX ORDER — HYDROCODONE BITARTRATE AND ACETAMINOPHEN 5; 325 MG/1; MG/1
1 TABLET ORAL EVERY 4 HOURS PRN
Status: DISCONTINUED | OUTPATIENT
Start: 2020-12-30 | End: 2021-01-02

## 2020-12-30 RX ORDER — EPHEDRINE SULFATE 50 MG/ML
5 INJECTION, SOLUTION INTRAVENOUS ONCE AS NEEDED
Status: DISCONTINUED | OUTPATIENT
Start: 2020-12-30 | End: 2020-12-30 | Stop reason: HOSPADM

## 2020-12-30 RX ORDER — NALOXONE HCL 0.4 MG/ML
0.1 VIAL (ML) INJECTION
Status: DISCONTINUED | OUTPATIENT
Start: 2020-12-30 | End: 2021-01-13 | Stop reason: HOSPADM

## 2020-12-30 RX ORDER — HYDROMORPHONE HCL 110MG/55ML
0.5 PATIENT CONTROLLED ANALGESIA SYRINGE INTRAVENOUS
Status: DISCONTINUED | OUTPATIENT
Start: 2020-12-30 | End: 2021-01-01

## 2020-12-30 RX ORDER — PROPOFOL 10 MG/ML
VIAL (ML) INTRAVENOUS AS NEEDED
Status: DISCONTINUED | OUTPATIENT
Start: 2020-12-30 | End: 2020-12-30 | Stop reason: SURG

## 2020-12-30 RX ORDER — SODIUM CHLORIDE 0.9 % (FLUSH) 0.9 %
10 SYRINGE (ML) INJECTION AS NEEDED
Status: DISCONTINUED | OUTPATIENT
Start: 2020-12-30 | End: 2020-12-30 | Stop reason: HOSPADM

## 2020-12-30 RX ORDER — SODIUM CHLORIDE, SODIUM LACTATE, POTASSIUM CHLORIDE, CALCIUM CHLORIDE 600; 310; 30; 20 MG/100ML; MG/100ML; MG/100ML; MG/100ML
9 INJECTION, SOLUTION INTRAVENOUS CONTINUOUS PRN
Status: DISCONTINUED | OUTPATIENT
Start: 2020-12-30 | End: 2021-01-13 | Stop reason: HOSPADM

## 2020-12-30 RX ORDER — METOCLOPRAMIDE 5 MG/1
5 TABLET ORAL 3 TIMES DAILY PRN
Status: DISCONTINUED | OUTPATIENT
Start: 2020-12-30 | End: 2021-01-02

## 2020-12-30 RX ORDER — IPRATROPIUM BROMIDE AND ALBUTEROL SULFATE 2.5; .5 MG/3ML; MG/3ML
3 SOLUTION RESPIRATORY (INHALATION) ONCE
Status: COMPLETED | OUTPATIENT
Start: 2020-12-30 | End: 2020-12-30

## 2020-12-30 RX ORDER — PANTOPRAZOLE SODIUM 40 MG/1
40 TABLET, DELAYED RELEASE ORAL
Status: DISCONTINUED | OUTPATIENT
Start: 2020-12-31 | End: 2021-01-13 | Stop reason: HOSPADM

## 2020-12-30 RX ORDER — IPRATROPIUM BROMIDE AND ALBUTEROL SULFATE 2.5; .5 MG/3ML; MG/3ML
3 SOLUTION RESPIRATORY (INHALATION) ONCE AS NEEDED
Status: DISCONTINUED | OUTPATIENT
Start: 2020-12-30 | End: 2020-12-30 | Stop reason: HOSPADM

## 2020-12-30 RX ORDER — HYDROCODONE BITARTRATE AND ACETAMINOPHEN 7.5; 325 MG/1; MG/1
1 TABLET ORAL EVERY 4 HOURS PRN
Qty: 30 TABLET | Refills: 0 | Status: SHIPPED | OUTPATIENT
Start: 2020-12-30 | End: 2021-01-13 | Stop reason: SDUPTHER

## 2020-12-30 RX ORDER — ACETAMINOPHEN 325 MG/1
650 TABLET ORAL ONCE AS NEEDED
Status: DISCONTINUED | OUTPATIENT
Start: 2020-12-30 | End: 2020-12-30 | Stop reason: HOSPADM

## 2020-12-30 RX ORDER — PROMETHAZINE HYDROCHLORIDE 25 MG/1
25 TABLET ORAL ONCE AS NEEDED
Status: DISCONTINUED | OUTPATIENT
Start: 2020-12-30 | End: 2020-12-30 | Stop reason: HOSPADM

## 2020-12-30 RX ORDER — ACETAMINOPHEN 650 MG/1
650 SUPPOSITORY RECTAL EVERY 4 HOURS PRN
Status: DISCONTINUED | OUTPATIENT
Start: 2020-12-30 | End: 2021-01-13 | Stop reason: HOSPADM

## 2020-12-30 RX ORDER — IPRATROPIUM BROMIDE AND ALBUTEROL SULFATE 2.5; .5 MG/3ML; MG/3ML
3 SOLUTION RESPIRATORY (INHALATION)
Status: DISCONTINUED | OUTPATIENT
Start: 2020-12-30 | End: 2021-01-13 | Stop reason: HOSPADM

## 2020-12-30 RX ORDER — PHENYLEPHRINE HCL IN 0.9% NACL 0.5 MG/5ML
SYRINGE (ML) INTRAVENOUS AS NEEDED
Status: DISCONTINUED | OUTPATIENT
Start: 2020-12-30 | End: 2020-12-30 | Stop reason: SURG

## 2020-12-30 RX ORDER — LOSARTAN POTASSIUM 50 MG/1
100 TABLET ORAL DAILY
Status: DISCONTINUED | OUTPATIENT
Start: 2020-12-30 | End: 2021-01-01

## 2020-12-30 RX ORDER — DOCUSATE SODIUM 100 MG/1
100 CAPSULE, LIQUID FILLED ORAL 2 TIMES DAILY
Status: DISCONTINUED | OUTPATIENT
Start: 2020-12-30 | End: 2021-01-13 | Stop reason: HOSPADM

## 2020-12-30 RX ORDER — MIRTAZAPINE 7.5 MG/1
7.5 TABLET, FILM COATED ORAL NIGHTLY
Status: DISCONTINUED | OUTPATIENT
Start: 2020-12-30 | End: 2021-01-01

## 2020-12-30 RX ORDER — OXYCODONE HYDROCHLORIDE 5 MG/1
10 TABLET ORAL EVERY 4 HOURS PRN
Status: DISCONTINUED | OUTPATIENT
Start: 2020-12-30 | End: 2021-01-02

## 2020-12-30 RX ORDER — PANTOPRAZOLE SODIUM 40 MG/1
40 TABLET, DELAYED RELEASE ORAL
Status: DISCONTINUED | OUTPATIENT
Start: 2020-12-31 | End: 2020-12-30

## 2020-12-30 RX ORDER — ONDANSETRON 2 MG/ML
4 INJECTION INTRAMUSCULAR; INTRAVENOUS EVERY 6 HOURS PRN
Status: DISCONTINUED | OUTPATIENT
Start: 2020-12-30 | End: 2021-01-13 | Stop reason: HOSPADM

## 2020-12-30 RX ORDER — LIDOCAINE HYDROCHLORIDE 20 MG/ML
INJECTION, SOLUTION EPIDURAL; INFILTRATION; INTRACAUDAL; PERINEURAL AS NEEDED
Status: DISCONTINUED | OUTPATIENT
Start: 2020-12-30 | End: 2020-12-30 | Stop reason: SURG

## 2020-12-30 RX ORDER — ACETAMINOPHEN 325 MG/1
650 TABLET ORAL EVERY 4 HOURS PRN
Status: DISCONTINUED | OUTPATIENT
Start: 2020-12-30 | End: 2021-01-13 | Stop reason: HOSPADM

## 2020-12-30 RX ORDER — BUPIVACAINE HYDROCHLORIDE 2.5 MG/ML
INJECTION, SOLUTION EPIDURAL; INFILTRATION; INTRACAUDAL AS NEEDED
Status: DISCONTINUED | OUTPATIENT
Start: 2020-12-30 | End: 2020-12-30 | Stop reason: HOSPADM

## 2020-12-30 RX ORDER — ROCURONIUM BROMIDE 10 MG/ML
INJECTION, SOLUTION INTRAVENOUS AS NEEDED
Status: DISCONTINUED | OUTPATIENT
Start: 2020-12-30 | End: 2020-12-30 | Stop reason: SURG

## 2020-12-30 RX ORDER — LABETALOL HYDROCHLORIDE 5 MG/ML
5 INJECTION, SOLUTION INTRAVENOUS
Status: DISCONTINUED | OUTPATIENT
Start: 2020-12-30 | End: 2020-12-30 | Stop reason: HOSPADM

## 2020-12-30 RX ORDER — MORPHINE SULFATE 4 MG/ML
4 INJECTION, SOLUTION INTRAMUSCULAR; INTRAVENOUS
Status: DISCONTINUED | OUTPATIENT
Start: 2020-12-30 | End: 2020-12-31

## 2020-12-30 RX ORDER — LEVOTHYROXINE SODIUM 0.1 MG/1
100 TABLET ORAL
Status: DISCONTINUED | OUTPATIENT
Start: 2020-12-31 | End: 2021-01-13 | Stop reason: HOSPADM

## 2020-12-30 RX ORDER — ROSUVASTATIN CALCIUM 10 MG/1
10 TABLET, COATED ORAL NIGHTLY
Status: DISCONTINUED | OUTPATIENT
Start: 2020-12-30 | End: 2021-01-13 | Stop reason: HOSPADM

## 2020-12-30 RX ORDER — ONDANSETRON 2 MG/ML
4 INJECTION INTRAMUSCULAR; INTRAVENOUS ONCE AS NEEDED
Status: COMPLETED | OUTPATIENT
Start: 2020-12-30 | End: 2020-12-30

## 2020-12-30 RX ORDER — DEXAMETHASONE SODIUM PHOSPHATE 4 MG/ML
INJECTION, SOLUTION INTRA-ARTICULAR; INTRALESIONAL; INTRAMUSCULAR; INTRAVENOUS; SOFT TISSUE AS NEEDED
Status: DISCONTINUED | OUTPATIENT
Start: 2020-12-30 | End: 2020-12-30 | Stop reason: SURG

## 2020-12-30 RX ORDER — FAMOTIDINE 10 MG/ML
20 INJECTION, SOLUTION INTRAVENOUS ONCE
Status: COMPLETED | OUTPATIENT
Start: 2020-12-30 | End: 2020-12-30

## 2020-12-30 RX ORDER — NALOXONE HCL 0.4 MG/ML
0.4 VIAL (ML) INJECTION
Status: DISCONTINUED | OUTPATIENT
Start: 2020-12-30 | End: 2021-01-13 | Stop reason: HOSPADM

## 2020-12-30 RX ORDER — PROMETHAZINE HYDROCHLORIDE 25 MG/1
25 SUPPOSITORY RECTAL ONCE AS NEEDED
Status: DISCONTINUED | OUTPATIENT
Start: 2020-12-30 | End: 2020-12-30 | Stop reason: HOSPADM

## 2020-12-30 RX ORDER — PROMETHAZINE HYDROCHLORIDE 12.5 MG/1
12.5 SUPPOSITORY RECTAL EVERY 6 HOURS PRN
Status: DISCONTINUED | OUTPATIENT
Start: 2020-12-30 | End: 2021-01-13 | Stop reason: HOSPADM

## 2020-12-30 RX ORDER — MORPHINE SULFATE 4 MG/ML
2 INJECTION, SOLUTION INTRAMUSCULAR; INTRAVENOUS
Status: DISCONTINUED | OUTPATIENT
Start: 2020-12-30 | End: 2020-12-30 | Stop reason: HOSPADM

## 2020-12-30 RX ADMIN — DEXAMETHASONE SODIUM PHOSPHATE 4 MG: 4 INJECTION, SOLUTION INTRAMUSCULAR; INTRAVENOUS at 10:27

## 2020-12-30 RX ADMIN — ROSUVASTATIN CALCIUM 10 MG: 10 TABLET, FILM COATED ORAL at 21:26

## 2020-12-30 RX ADMIN — FENTANYL CITRATE 50 MCG: 50 INJECTION, SOLUTION INTRAMUSCULAR; INTRAVENOUS at 11:46

## 2020-12-30 RX ADMIN — SODIUM CHLORIDE, POTASSIUM CHLORIDE, SODIUM LACTATE AND CALCIUM CHLORIDE: 600; 310; 30; 20 INJECTION, SOLUTION INTRAVENOUS at 11:53

## 2020-12-30 RX ADMIN — MORPHINE SULFATE 2 MG: 4 INJECTION INTRAVENOUS at 12:47

## 2020-12-30 RX ADMIN — FENTANYL CITRATE 50 MCG: 50 INJECTION, SOLUTION INTRAMUSCULAR; INTRAVENOUS at 10:11

## 2020-12-30 RX ADMIN — LABETALOL 20 MG/4 ML (5 MG/ML) INTRAVENOUS SYRINGE 7.5 MG: at 12:03

## 2020-12-30 RX ADMIN — MIRTAZAPINE 7.5 MG: 15 TABLET, FILM COATED ORAL at 21:26

## 2020-12-30 RX ADMIN — IPRATROPIUM BROMIDE AND ALBUTEROL SULFATE 3 ML: 2.5; .5 SOLUTION RESPIRATORY (INHALATION) at 08:57

## 2020-12-30 RX ADMIN — PROPOFOL 50 MG: 10 INJECTION, EMULSION INTRAVENOUS at 11:42

## 2020-12-30 RX ADMIN — LOSARTAN POTASSIUM 100 MG: 50 TABLET, FILM COATED ORAL at 17:58

## 2020-12-30 RX ADMIN — SUCRALFATE 1 G: 1 TABLET ORAL at 21:26

## 2020-12-30 RX ADMIN — LORAZEPAM 0.5 MG: 2 INJECTION INTRAMUSCULAR; INTRAVENOUS at 12:56

## 2020-12-30 RX ADMIN — ONDANSETRON 4 MG: 2 INJECTION INTRAMUSCULAR; INTRAVENOUS at 11:42

## 2020-12-30 RX ADMIN — ROCURONIUM BROMIDE 10 MG: 10 INJECTION, SOLUTION INTRAVENOUS at 11:10

## 2020-12-30 RX ADMIN — IPRATROPIUM BROMIDE AND ALBUTEROL SULFATE 3 ML: 2.5; .5 SOLUTION RESPIRATORY (INHALATION) at 22:53

## 2020-12-30 RX ADMIN — HYDROMORPHONE HYDROCHLORIDE 0.5 MG: 2 INJECTION, SOLUTION INTRAMUSCULAR; INTRAVENOUS; SUBCUTANEOUS at 13:22

## 2020-12-30 RX ADMIN — CEFAZOLIN SODIUM 2 G: 1 INJECTION, POWDER, FOR SOLUTION INTRAMUSCULAR; INTRAVENOUS at 10:22

## 2020-12-30 RX ADMIN — MORPHINE SULFATE 2 MG: 4 INJECTION INTRAVENOUS at 12:35

## 2020-12-30 RX ADMIN — FAMOTIDINE 20 MG: 10 INJECTION, SOLUTION INTRAVENOUS at 09:06

## 2020-12-30 RX ADMIN — IPRATROPIUM BROMIDE AND ALBUTEROL SULFATE 3 ML: 2.5; .5 SOLUTION RESPIRATORY (INHALATION) at 17:01

## 2020-12-30 RX ADMIN — FENTANYL CITRATE 50 MCG: 50 INJECTION, SOLUTION INTRAMUSCULAR; INTRAVENOUS at 10:36

## 2020-12-30 RX ADMIN — SERTRALINE HYDROCHLORIDE 100 MG: 100 TABLET ORAL at 21:26

## 2020-12-30 RX ADMIN — ROCURONIUM BROMIDE 40 MG: 10 INJECTION, SOLUTION INTRAVENOUS at 10:12

## 2020-12-30 RX ADMIN — Medication 0.6 MG: at 11:51

## 2020-12-30 RX ADMIN — Medication 4 MG: at 11:51

## 2020-12-30 RX ADMIN — FENTANYL CITRATE 50 MCG: 50 INJECTION, SOLUTION INTRAMUSCULAR; INTRAVENOUS at 11:51

## 2020-12-30 RX ADMIN — PROPOFOL 100 MG: 10 INJECTION, EMULSION INTRAVENOUS at 10:12

## 2020-12-30 RX ADMIN — PROPOFOL 50 MG: 10 INJECTION, EMULSION INTRAVENOUS at 10:38

## 2020-12-30 RX ADMIN — MUPIROCIN: 20 OINTMENT TOPICAL at 08:38

## 2020-12-30 RX ADMIN — DOCUSATE SODIUM 100 MG: 100 CAPSULE, LIQUID FILLED ORAL at 21:26

## 2020-12-30 RX ADMIN — LIDOCAINE HYDROCHLORIDE: 20 SOLUTION ORAL; TOPICAL at 23:24

## 2020-12-30 RX ADMIN — PHENYLEPHRINE HYDROCHLORIDE 100 MCG: 10 INJECTION INTRAVENOUS at 10:25

## 2020-12-30 RX ADMIN — HYDROMORPHONE HYDROCHLORIDE 0.5 MG: 2 INJECTION, SOLUTION INTRAMUSCULAR; INTRAVENOUS; SUBCUTANEOUS at 23:29

## 2020-12-30 RX ADMIN — AMLODIPINE BESYLATE 10 MG: 5 TABLET ORAL at 17:58

## 2020-12-30 RX ADMIN — LIDOCAINE HYDROCHLORIDE 100 MG: 20 INJECTION, SOLUTION EPIDURAL; INFILTRATION; INTRACAUDAL; PERINEURAL at 10:12

## 2020-12-30 RX ADMIN — HYDROMORPHONE HYDROCHLORIDE 0.5 MG: 2 INJECTION, SOLUTION INTRAMUSCULAR; INTRAVENOUS; SUBCUTANEOUS at 12:56

## 2020-12-30 RX ADMIN — CEFAZOLIN 1 G: 1 INJECTION, POWDER, FOR SOLUTION INTRAMUSCULAR; INTRAVENOUS at 17:59

## 2020-12-30 RX ADMIN — OXYCODONE 10 MG: 5 TABLET ORAL at 21:26

## 2020-12-30 RX ADMIN — SUCRALFATE 1 G: 1 TABLET ORAL at 17:59

## 2020-12-30 RX ADMIN — SODIUM CHLORIDE, POTASSIUM CHLORIDE, SODIUM LACTATE AND CALCIUM CHLORIDE 9 ML/HR: 600; 310; 30; 20 INJECTION, SOLUTION INTRAVENOUS at 08:38

## 2020-12-30 RX ADMIN — SODIUM CHLORIDE 100 ML/HR: 9 INJECTION, SOLUTION INTRAVENOUS at 20:09

## 2020-12-30 NOTE — ANESTHESIA POSTPROCEDURE EVALUATION
Patient: Diane Guan    Procedure Summary     Date: 12/30/20 Room / Location: [facility] OR 08 / [facility] MAIN OR    Anesthesia Start: 1007 Anesthesia Stop: 1209    Procedure: Laparoscopic hiatal hernia repair with gastropexy (N/A Abdomen) Diagnosis:       Hiatal hernia with gastroesophageal reflux      (Hiatal hernia with gastroesophageal reflux [K21.9, K44.9])    Surgeon: Den Plummer DO Provider: Holden Gonzalez MD    Anesthesia Type: general ASA Status: 3          Anesthesia Type: general    Vitals  Vitals Value Taken Time   /49 12/30/20 1336   Temp 98.2 °F (36.8 °C) 12/30/20 1209   Pulse 64 12/30/20 1336   Resp 16 12/30/20 1330   SpO2 99 % 12/30/20 1336   Vitals shown include unvalidated device data.        Post Anesthesia Care and Evaluation    Patient location during evaluation: PACU  Patient participation: complete - patient participated  Level of consciousness: awake  Pain scale: See nurse's notes for pain score.  Pain management: adequate  Airway patency: patent  Anesthetic complications: No anesthetic complications  PONV Status: none  Cardiovascular status: acceptable  Respiratory status: acceptable  Hydration status: acceptable    Comments: Patient seen and examined postoperatively; vital signs stable; SpO2 greater than or equal to 90%; cardiopulmonary status stable; nausea/vomiting adequately controlled; pain adequately controlled; no apparent anesthesia complications; patient discharged from anesthesia care when discharge criteria were met

## 2020-12-30 NOTE — ANESTHESIA PREPROCEDURE EVALUATION
Anesthesia Evaluation     Patient summary reviewed and Nursing notes reviewed   no history of anesthetic complications:  NPO Solid Status: > 8 hours  NPO Liquid Status: > 8 hours           Airway   Dental      Pulmonary    (+) a smoker Former, sleep apnea on CPAP,   Cardiovascular     ECG reviewed    (+) hypertension, CEBALLOS, hyperlipidemia,       Neuro/Psych  (+) psychiatric history Anxiety and Depression,     GI/Hepatic/Renal/Endo    (+) obesity,  hiatal hernia, GERD,  diabetes mellitus, thyroid problem hypothyroidism    Musculoskeletal     Abdominal    Substance History      OB/GYN          Other   arthritis, blood dyscrasia anemia,   history of cancer    ROS/Med Hx Other: Allergies, CLL, breast cancer, nocturnal hypoxemia, gait abnormality, OAB, dysphagia, h/o aspiration pneumonia with MAC, h/o bronchitis, low vit B12 and D, shoulder pain, insomnia, gallbladder disease    PSH  BLADDER SURGERY CYST REMOVAL  TUBAL ABDOMINAL LIGATION MASTECTOMY  COLONOSCOPY BRONCHOSCOPY  ENDOSCOPY                   Anesthesia Plan    ASA 3     general   (Patient identified; pre-operative vital signs, all relevant labs/studies, complete medical/surgical/anesthetic history, full medication list, full allergy list, and NPO status obtained/reviewed; physical assessment performed; anesthetic options, side effects, potential complications, risks, and benefits discussed; questions answered; written anesthesia consent obtained; patient cleared for procedure; anesthesia machine and equipment checked and functioning)  intravenous induction     Anesthetic plan, all risks, benefits, and alternatives have been provided, discussed and informed consent has been obtained with: patient.    Plan discussed with CRNA and CAA.

## 2020-12-31 ENCOUNTER — APPOINTMENT (OUTPATIENT)
Dept: GENERAL RADIOLOGY | Facility: HOSPITAL | Age: 80
End: 2020-12-31

## 2020-12-31 LAB
ANION GAP SERPL CALCULATED.3IONS-SCNC: 9 MMOL/L (ref 5–15)
ANISOCYTOSIS BLD QL: ABNORMAL
BUN SERPL-MCNC: 26 MG/DL (ref 8–23)
BUN/CREAT SERPL: 21.8 (ref 7–25)
CALCIUM SPEC-SCNC: 7.8 MG/DL (ref 8.6–10.5)
CHLORIDE SERPL-SCNC: 106 MMOL/L (ref 98–107)
CO2 SERPL-SCNC: 25 MMOL/L (ref 22–29)
CREAT SERPL-MCNC: 1.19 MG/DL (ref 0.57–1)
DEPRECATED RDW RBC AUTO: 47.3 FL (ref 37–54)
ERYTHROCYTE [DISTWIDTH] IN BLOOD BY AUTOMATED COUNT: 15.8 % (ref 12.3–15.4)
GFR SERPL CREATININE-BSD FRML MDRD: 44 ML/MIN/1.73
GLUCOSE BLDC GLUCOMTR-MCNC: 130 MG/DL (ref 70–105)
GLUCOSE BLDC GLUCOMTR-MCNC: 134 MG/DL (ref 70–105)
GLUCOSE BLDC GLUCOMTR-MCNC: 140 MG/DL (ref 70–105)
GLUCOSE BLDC GLUCOMTR-MCNC: 143 MG/DL (ref 70–105)
GLUCOSE SERPL-MCNC: 140 MG/DL (ref 65–99)
HCT VFR BLD AUTO: 32.2 % (ref 34–46.6)
HGB BLD-MCNC: 9.7 G/DL (ref 12–15.9)
LAB AP CASE REPORT: NORMAL
LARGE PLATELETS: ABNORMAL
LYMPHOCYTES # BLD MANUAL: 22.31 10*3/MM3 (ref 0.7–3.1)
LYMPHOCYTES NFR BLD MANUAL: 5 % (ref 5–12)
LYMPHOCYTES NFR BLD MANUAL: 67 % (ref 19.6–45.3)
MCH RBC QN AUTO: 25.5 PG (ref 26.6–33)
MCHC RBC AUTO-ENTMCNC: 30.2 G/DL (ref 31.5–35.7)
MCV RBC AUTO: 84.5 FL (ref 79–97)
MONOCYTES # BLD AUTO: 1.67 10*3/MM3 (ref 0.1–0.9)
NEUTROPHILS # BLD AUTO: 8.99 10*3/MM3 (ref 1.7–7)
NEUTROPHILS NFR BLD MANUAL: 22 % (ref 42.7–76)
NEUTS BAND NFR BLD MANUAL: 5 % (ref 0–5)
PATH REPORT.FINAL DX SPEC: NORMAL
PATHOLOGY REVIEW: YES
PLATELET # BLD AUTO: 223 10*3/MM3 (ref 140–450)
PMV BLD AUTO: 9.4 FL (ref 6–12)
POTASSIUM SERPL-SCNC: 4.2 MMOL/L (ref 3.5–5.2)
RBC # BLD AUTO: 3.81 10*6/MM3 (ref 3.77–5.28)
SCAN SLIDE: NORMAL
SMUDGE CELLS BLD QL SMEAR: ABNORMAL
SODIUM SERPL-SCNC: 140 MMOL/L (ref 136–145)
VARIANT LYMPHS NFR BLD MANUAL: 1 % (ref 0–5)
WBC # BLD AUTO: 33.3 10*3/MM3 (ref 3.4–10.8)

## 2020-12-31 PROCEDURE — 94760 N-INVAS EAR/PLS OXIMETRY 1: CPT

## 2020-12-31 PROCEDURE — 94799 UNLISTED PULMONARY SVC/PX: CPT

## 2020-12-31 PROCEDURE — 85025 COMPLETE CBC W/AUTO DIFF WBC: CPT | Performed by: INTERNAL MEDICINE

## 2020-12-31 PROCEDURE — 25010000003 CEFAZOLIN PER 500 MG: Performed by: SURGERY

## 2020-12-31 PROCEDURE — 71045 X-RAY EXAM CHEST 1 VIEW: CPT

## 2020-12-31 PROCEDURE — 82962 GLUCOSE BLOOD TEST: CPT

## 2020-12-31 PROCEDURE — 99233 SBSQ HOSP IP/OBS HIGH 50: CPT | Performed by: INTERNAL MEDICINE

## 2020-12-31 PROCEDURE — 25010000002 PIPERACILLIN SOD-TAZOBACTAM PER 1 G: Performed by: INTERNAL MEDICINE

## 2020-12-31 PROCEDURE — 25010000002 HYDROMORPHONE PER 4 MG: Performed by: SURGERY

## 2020-12-31 PROCEDURE — 94660 CPAP INITIATION&MGMT: CPT

## 2020-12-31 PROCEDURE — 80048 BASIC METABOLIC PNL TOTAL CA: CPT | Performed by: INTERNAL MEDICINE

## 2020-12-31 PROCEDURE — 85007 BL SMEAR W/DIFF WBC COUNT: CPT | Performed by: INTERNAL MEDICINE

## 2020-12-31 RX ORDER — GUAIFENESIN 600 MG/1
600 TABLET, EXTENDED RELEASE ORAL EVERY 12 HOURS SCHEDULED
Status: DISCONTINUED | OUTPATIENT
Start: 2020-12-31 | End: 2021-01-13 | Stop reason: HOSPADM

## 2020-12-31 RX ORDER — AMOXICILLIN AND CLAVULANATE POTASSIUM 875; 125 MG/1; MG/1
1 TABLET, FILM COATED ORAL EVERY 12 HOURS SCHEDULED
Status: DISCONTINUED | OUTPATIENT
Start: 2020-12-31 | End: 2020-12-31

## 2020-12-31 RX ORDER — BUDESONIDE AND FORMOTEROL FUMARATE DIHYDRATE 160; 4.5 UG/1; UG/1
2 AEROSOL RESPIRATORY (INHALATION)
Status: DISCONTINUED | OUTPATIENT
Start: 2020-12-31 | End: 2021-01-13 | Stop reason: HOSPADM

## 2020-12-31 RX ADMIN — PIPERACILLIN AND TAZOBACTAM 3.38 G: 3; .375 INJECTION, POWDER, LYOPHILIZED, FOR SOLUTION INTRAVENOUS at 13:42

## 2020-12-31 RX ADMIN — GUAIFENESIN 600 MG: 600 TABLET, EXTENDED RELEASE ORAL at 20:02

## 2020-12-31 RX ADMIN — SUCRALFATE 1 G: 1 TABLET ORAL at 17:10

## 2020-12-31 RX ADMIN — SUCRALFATE 1 G: 1 TABLET ORAL at 10:53

## 2020-12-31 RX ADMIN — CEFAZOLIN 1 G: 1 INJECTION, POWDER, FOR SOLUTION INTRAMUSCULAR; INTRAVENOUS at 02:22

## 2020-12-31 RX ADMIN — DOCUSATE SODIUM 100 MG: 100 CAPSULE, LIQUID FILLED ORAL at 20:02

## 2020-12-31 RX ADMIN — AMLODIPINE BESYLATE 10 MG: 5 TABLET ORAL at 13:01

## 2020-12-31 RX ADMIN — IPRATROPIUM BROMIDE AND ALBUTEROL SULFATE 3 ML: 2.5; .5 SOLUTION RESPIRATORY (INHALATION) at 02:48

## 2020-12-31 RX ADMIN — ROSUVASTATIN CALCIUM 10 MG: 10 TABLET, FILM COATED ORAL at 20:02

## 2020-12-31 RX ADMIN — HYDROCODONE BITARTRATE AND ACETAMINOPHEN 1 TABLET: 5; 325 TABLET ORAL at 17:10

## 2020-12-31 RX ADMIN — FERROUS SULFATE TAB EC 324 MG (65 MG FE EQUIVALENT) 324 MG: 324 (65 FE) TABLET DELAYED RESPONSE at 09:01

## 2020-12-31 RX ADMIN — HYDROCODONE BITARTRATE AND ACETAMINOPHEN 1 TABLET: 5; 325 TABLET ORAL at 10:53

## 2020-12-31 RX ADMIN — SUCRALFATE 1 G: 1 TABLET ORAL at 09:01

## 2020-12-31 RX ADMIN — OXYCODONE 10 MG: 5 TABLET ORAL at 14:29

## 2020-12-31 RX ADMIN — IPRATROPIUM BROMIDE AND ALBUTEROL SULFATE 3 ML: 2.5; .5 SOLUTION RESPIRATORY (INHALATION) at 06:48

## 2020-12-31 RX ADMIN — LEVOTHYROXINE SODIUM 100 MCG: 125 TABLET ORAL at 09:02

## 2020-12-31 RX ADMIN — PIPERACILLIN AND TAZOBACTAM 3.38 G: 3; .375 INJECTION, POWDER, LYOPHILIZED, FOR SOLUTION INTRAVENOUS; PARENTERAL at 20:01

## 2020-12-31 RX ADMIN — MIRTAZAPINE 7.5 MG: 15 TABLET, FILM COATED ORAL at 20:02

## 2020-12-31 RX ADMIN — OXYCODONE 10 MG: 5 TABLET ORAL at 07:53

## 2020-12-31 RX ADMIN — HYDROMORPHONE HYDROCHLORIDE 0.5 MG: 2 INJECTION, SOLUTION INTRAMUSCULAR; INTRAVENOUS; SUBCUTANEOUS at 22:59

## 2020-12-31 RX ADMIN — IPRATROPIUM BROMIDE AND ALBUTEROL SULFATE 3 ML: 2.5; .5 SOLUTION RESPIRATORY (INHALATION) at 11:12

## 2020-12-31 RX ADMIN — DOCUSATE SODIUM 100 MG: 100 CAPSULE, LIQUID FILLED ORAL at 09:01

## 2020-12-31 RX ADMIN — BUDESONIDE AND FORMOTEROL FUMARATE DIHYDRATE 2 PUFF: 160; 4.5 AEROSOL RESPIRATORY (INHALATION) at 20:51

## 2020-12-31 RX ADMIN — LOSARTAN POTASSIUM 100 MG: 50 TABLET, FILM COATED ORAL at 09:01

## 2020-12-31 RX ADMIN — IPRATROPIUM BROMIDE AND ALBUTEROL SULFATE 3 ML: 2.5; .5 SOLUTION RESPIRATORY (INHALATION) at 20:50

## 2020-12-31 RX ADMIN — SERTRALINE HYDROCHLORIDE 100 MG: 100 TABLET ORAL at 20:02

## 2020-12-31 RX ADMIN — SUCRALFATE 1 G: 1 TABLET ORAL at 20:02

## 2021-01-01 ENCOUNTER — APPOINTMENT (OUTPATIENT)
Dept: CT IMAGING | Facility: HOSPITAL | Age: 81
End: 2021-01-01

## 2021-01-01 ENCOUNTER — APPOINTMENT (OUTPATIENT)
Dept: GENERAL RADIOLOGY | Facility: HOSPITAL | Age: 81
End: 2021-01-01

## 2021-01-01 PROBLEM — J96.01 ACUTE RESPIRATORY FAILURE WITH HYPOXIA AND HYPERCAPNIA (HCC): Status: ACTIVE | Noted: 2021-01-01

## 2021-01-01 PROBLEM — R10.9 ABDOMINAL PAIN: Status: ACTIVE | Noted: 2021-01-01

## 2021-01-01 PROBLEM — J96.02 ACUTE RESPIRATORY FAILURE WITH HYPOXIA AND HYPERCAPNIA: Status: ACTIVE | Noted: 2021-01-01

## 2021-01-01 LAB
ANION GAP SERPL CALCULATED.3IONS-SCNC: 7 MMOL/L (ref 5–15)
ANISOCYTOSIS BLD QL: ABNORMAL
ARTERIAL PATENCY WRIST A: POSITIVE
ATMOSPHERIC PRESS: ABNORMAL MM[HG]
BASE EXCESS BLDA CALC-SCNC: 0.3 MMOL/L (ref 0–3)
BASE EXCESS BLDA CALC-SCNC: 0.6 MMOL/L (ref 0–3)
BASE EXCESS BLDA CALC-SCNC: 1.6 MMOL/L (ref 0–3)
BASE EXCESS BLDA CALC-SCNC: 2.7 MMOL/L (ref 0–3)
BDY SITE: ABNORMAL
BUN SERPL-MCNC: 25 MG/DL (ref 8–23)
BUN/CREAT SERPL: 19.2 (ref 7–25)
CALCIUM SPEC-SCNC: 8.9 MG/DL (ref 8.6–10.5)
CHLORIDE SERPL-SCNC: 100 MMOL/L (ref 98–107)
CO2 BLDA-SCNC: 29.3 MMOL/L (ref 22–29)
CO2 BLDA-SCNC: 29.5 MMOL/L (ref 22–29)
CO2 BLDA-SCNC: 30.6 MMOL/L (ref 22–29)
CO2 BLDA-SCNC: 30.6 MMOL/L (ref 22–29)
CO2 SERPL-SCNC: 27 MMOL/L (ref 22–29)
CREAT SERPL-MCNC: 1.3 MG/DL (ref 0.57–1)
DEPRECATED RDW RBC AUTO: 48.1 FL (ref 37–54)
EOSINOPHIL # BLD MANUAL: 0.35 10*3/MM3 (ref 0–0.4)
EOSINOPHIL NFR BLD MANUAL: 2 % (ref 0.3–6.2)
ERYTHROCYTE [DISTWIDTH] IN BLOOD BY AUTOMATED COUNT: 16.2 % (ref 12.3–15.4)
GFR SERPL CREATININE-BSD FRML MDRD: 40 ML/MIN/1.73
GLUCOSE BLDC GLUCOMTR-MCNC: 126 MG/DL (ref 70–105)
GLUCOSE SERPL-MCNC: 137 MG/DL (ref 65–99)
HCO3 BLDA-SCNC: 27.6 MMOL/L (ref 21–28)
HCO3 BLDA-SCNC: 27.9 MMOL/L (ref 21–28)
HCO3 BLDA-SCNC: 28.6 MMOL/L (ref 21–28)
HCO3 BLDA-SCNC: 29 MMOL/L (ref 21–28)
HCT VFR BLD AUTO: 32.1 % (ref 34–46.6)
HEMODILUTION: NO
HGB BLD-MCNC: 9.7 G/DL (ref 12–15.9)
INHALED O2 CONCENTRATION: 100 %
INHALED O2 CONCENTRATION: 90 %
LYMPHOCYTES # BLD MANUAL: 10.74 10*3/MM3 (ref 0.7–3.1)
LYMPHOCYTES NFR BLD MANUAL: 3 % (ref 5–12)
LYMPHOCYTES NFR BLD MANUAL: 61 % (ref 19.6–45.3)
MCH RBC QN AUTO: 25.7 PG (ref 26.6–33)
MCHC RBC AUTO-ENTMCNC: 30.4 G/DL (ref 31.5–35.7)
MCV RBC AUTO: 84.7 FL (ref 79–97)
MODALITY: ABNORMAL
MONOCYTES # BLD AUTO: 0.53 10*3/MM3 (ref 0.1–0.9)
NEUTROPHILS # BLD AUTO: 5.98 10*3/MM3 (ref 1.7–7)
NEUTROPHILS NFR BLD MANUAL: 27 % (ref 42.7–76)
NEUTS BAND NFR BLD MANUAL: 7 % (ref 0–5)
PCO2 BLDA: 51.2 MM HG (ref 35–48)
PCO2 BLDA: 51.9 MM HG (ref 35–48)
PCO2 BLDA: 55.3 MM HG (ref 35–48)
PCO2 BLDA: 63 MM HG (ref 35–48)
PEEP RESPIRATORY: 9 CM[H2O]
PEEP RESPIRATORY: 9 CM[H2O]
PH BLDA: 7.26 PH UNITS (ref 7.35–7.45)
PH BLDA: 7.31 PH UNITS (ref 7.35–7.45)
PH BLDA: 7.34 PH UNITS (ref 7.35–7.45)
PH BLDA: 7.36 PH UNITS (ref 7.35–7.45)
PLAT MORPH BLD: NORMAL
PLATELET # BLD AUTO: 179 10*3/MM3 (ref 140–450)
PMV BLD AUTO: 9.6 FL (ref 6–12)
PO2 BLDA: 164.1 MM HG (ref 83–108)
PO2 BLDA: 55.8 MM HG (ref 83–108)
PO2 BLDA: 63.4 MM HG (ref 83–108)
PO2 BLDA: 93.9 MM HG (ref 83–108)
POTASSIUM SERPL-SCNC: 4.4 MMOL/L (ref 3.5–5.2)
RBC # BLD AUTO: 3.79 10*6/MM3 (ref 3.77–5.28)
RESPIRATORY RATE: 24
RESPIRATORY RATE: 25
RESPIRATORY RATE: 28
SAO2 % BLDCOA: 83 % (ref 94–98)
SAO2 % BLDCOA: 90.4 % (ref 94–98)
SAO2 % BLDCOA: 96.3 % (ref 94–98)
SAO2 % BLDCOA: 99.4 % (ref 94–98)
SCAN SLIDE: NORMAL
SMUDGE CELLS BLD QL SMEAR: ABNORMAL
SODIUM SERPL-SCNC: 134 MMOL/L (ref 136–145)
VENTILATOR MODE: ABNORMAL
VENTILATOR MODE: ABNORMAL
VT ON VENT VENT: 500 ML
VT ON VENT VENT: 500 ML
WBC # BLD AUTO: 17.6 10*3/MM3 (ref 3.4–10.8)

## 2021-01-01 PROCEDURE — 99024 POSTOP FOLLOW-UP VISIT: CPT | Performed by: SURGERY

## 2021-01-01 PROCEDURE — 36600 WITHDRAWAL OF ARTERIAL BLOOD: CPT

## 2021-01-01 PROCEDURE — 71250 CT THORAX DX C-: CPT

## 2021-01-01 PROCEDURE — 94799 UNLISTED PULMONARY SVC/PX: CPT

## 2021-01-01 PROCEDURE — 85007 BL SMEAR W/DIFF WBC COUNT: CPT | Performed by: INTERNAL MEDICINE

## 2021-01-01 PROCEDURE — 80048 BASIC METABOLIC PNL TOTAL CA: CPT | Performed by: INTERNAL MEDICINE

## 2021-01-01 PROCEDURE — 94660 CPAP INITIATION&MGMT: CPT

## 2021-01-01 PROCEDURE — 25010000002 LORAZEPAM PER 2 MG: Performed by: NURSE PRACTITIONER

## 2021-01-01 PROCEDURE — 99233 SBSQ HOSP IP/OBS HIGH 50: CPT | Performed by: INTERNAL MEDICINE

## 2021-01-01 PROCEDURE — 85025 COMPLETE CBC W/AUTO DIFF WBC: CPT | Performed by: INTERNAL MEDICINE

## 2021-01-01 PROCEDURE — 74176 CT ABD & PELVIS W/O CONTRAST: CPT

## 2021-01-01 PROCEDURE — 25010000002 PIPERACILLIN SOD-TAZOBACTAM PER 1 G: Performed by: INTERNAL MEDICINE

## 2021-01-01 PROCEDURE — 82803 BLOOD GASES ANY COMBINATION: CPT

## 2021-01-01 PROCEDURE — 82962 GLUCOSE BLOOD TEST: CPT

## 2021-01-01 RX ORDER — RISPERIDONE 0.5 MG/1
0.25 TABLET, ORALLY DISINTEGRATING ORAL NIGHTLY PRN
Status: DISCONTINUED | OUTPATIENT
Start: 2021-01-01 | End: 2021-01-03

## 2021-01-01 RX ORDER — MORPHINE SULFATE 4 MG/ML
2 INJECTION, SOLUTION INTRAMUSCULAR; INTRAVENOUS
Status: DISCONTINUED | OUTPATIENT
Start: 2021-01-01 | End: 2021-01-02

## 2021-01-01 RX ORDER — LORAZEPAM 2 MG/ML
0.5 INJECTION INTRAMUSCULAR ONCE
Status: COMPLETED | OUTPATIENT
Start: 2021-01-01 | End: 2021-01-01

## 2021-01-01 RX ORDER — RISPERIDONE 0.5 MG/1
0.5 TABLET, ORALLY DISINTEGRATING ORAL NIGHTLY PRN
Status: DISCONTINUED | OUTPATIENT
Start: 2021-01-01 | End: 2021-01-01

## 2021-01-01 RX ADMIN — AMLODIPINE BESYLATE 10 MG: 5 TABLET ORAL at 13:23

## 2021-01-01 RX ADMIN — OXYCODONE 10 MG: 5 TABLET ORAL at 22:15

## 2021-01-01 RX ADMIN — IPRATROPIUM BROMIDE AND ALBUTEROL SULFATE 3 ML: 2.5; .5 SOLUTION RESPIRATORY (INHALATION) at 10:28

## 2021-01-01 RX ADMIN — IPRATROPIUM BROMIDE AND ALBUTEROL SULFATE 3 ML: 2.5; .5 SOLUTION RESPIRATORY (INHALATION) at 18:28

## 2021-01-01 RX ADMIN — ROSUVASTATIN CALCIUM 10 MG: 10 TABLET, FILM COATED ORAL at 20:05

## 2021-01-01 RX ADMIN — IPRATROPIUM BROMIDE AND ALBUTEROL SULFATE 3 ML: 2.5; .5 SOLUTION RESPIRATORY (INHALATION) at 01:25

## 2021-01-01 RX ADMIN — BUDESONIDE AND FORMOTEROL FUMARATE DIHYDRATE 2 PUFF: 160; 4.5 AEROSOL RESPIRATORY (INHALATION) at 18:29

## 2021-01-01 RX ADMIN — IPRATROPIUM BROMIDE AND ALBUTEROL SULFATE 3 ML: 2.5; .5 SOLUTION RESPIRATORY (INHALATION) at 23:00

## 2021-01-01 RX ADMIN — DOCUSATE SODIUM 100 MG: 100 CAPSULE, LIQUID FILLED ORAL at 20:05

## 2021-01-01 RX ADMIN — IPRATROPIUM BROMIDE AND ALBUTEROL SULFATE 3 ML: 2.5; .5 SOLUTION RESPIRATORY (INHALATION) at 14:54

## 2021-01-01 RX ADMIN — GUAIFENESIN 600 MG: 600 TABLET, EXTENDED RELEASE ORAL at 20:05

## 2021-01-01 RX ADMIN — SUCRALFATE 1 G: 1 TABLET ORAL at 20:05

## 2021-01-01 RX ADMIN — OXYCODONE 10 MG: 5 TABLET ORAL at 17:09

## 2021-01-01 RX ADMIN — OXYCODONE 10 MG: 5 TABLET ORAL at 09:07

## 2021-01-01 RX ADMIN — PIPERACILLIN AND TAZOBACTAM 3.38 G: 3; .375 INJECTION, POWDER, LYOPHILIZED, FOR SOLUTION INTRAVENOUS; PARENTERAL at 06:09

## 2021-01-01 RX ADMIN — IPRATROPIUM BROMIDE AND ALBUTEROL SULFATE 3 ML: 2.5; .5 SOLUTION RESPIRATORY (INHALATION) at 06:50

## 2021-01-01 RX ADMIN — PIPERACILLIN AND TAZOBACTAM 3.38 G: 3; .375 INJECTION, POWDER, LYOPHILIZED, FOR SOLUTION INTRAVENOUS; PARENTERAL at 20:04

## 2021-01-01 RX ADMIN — LORAZEPAM 0.5 MG: 2 INJECTION INTRAMUSCULAR; INTRAVENOUS at 02:00

## 2021-01-01 RX ADMIN — LORAZEPAM 0.5 MG: 2 INJECTION INTRAMUSCULAR; INTRAVENOUS at 04:10

## 2021-01-01 RX ADMIN — PIPERACILLIN AND TAZOBACTAM 3.38 G: 3; .375 INJECTION, POWDER, LYOPHILIZED, FOR SOLUTION INTRAVENOUS; PARENTERAL at 14:02

## 2021-01-01 RX ADMIN — RISPERIDONE 0.25 MG: 0.5 TABLET, ORALLY DISINTEGRATING ORAL at 20:06

## 2021-01-01 NOTE — NURSING NOTE
[facility] and stat CT chest/ abd/ pelvis.  Awaiting results.   Repeat ABG done.  Pt remains on AVAPS 100%.  Daughter at bedside.

## 2021-01-01 NOTE — CONSULTS
PULMONARY/CRITICAL CARE CONSULT NOTE     PATIENT NAME:  Diane Guan       :  1940       MRN:  [id number]       ROOM:  [place] 356612/1     PRIMARY CARE PROVIDER  Jemima Fontaine MD    REFERRING PROVIDER     Den Plummer DO    REASON FOR CONSULTATION  Hypoxia     SUBJECTIVE     CHIEF COMPLAINT:   Large hiatal hernia    HISTORY OF PRESENT ILLNESS:  Diane Guan is a 80 y.o. female who presented to the hospital on 20 to undergo an elective laparoscopic hiatal hernia repair with gastropexy secondary to a large hiatal hernia with high-grade reflux and pulmonary compromise.  She has a PMH sig for LIBBY and uses a PAP.  She last saw Dr. Frankel about three months ago according to her daughter who is present at bedside.  Early this morning the patient was a FAST call due to an abnormal blood gas that showed an elevated pco2 of 63 and a decreased PO2 of 55.8.  The patient had been wearing a precision high flow nasal cannula of 30L FIO2 100%.  She was placed on AVAPs.  The patient is seen to have a firm and distended abdomen.  Daughter stated the patient has not had a bowel movement in 3 days.  The patient had been reporting abdominal pain.  No reports of nausea or vomiting.  I am told the patient has not voided during this shift.  Hospitalist NP ordered a CT chest as well as CT abdomen/pelvis WO.  RN was advised to call the general surgeon to give an update.  The patient will be transferred to PCU for continued care.           Pulmonary consultation was requested for further evaluation and treatment of respiratory needs.    Thank you for this consultation, we will follow along on this patient.        REVIEW OF SYSTEMS:  Review of systems could not be obtained due to   patient confusion. On AVAPs      ASSESSMENT & PLAN     Principal Problem:    Hiatal hernia with gastroesophageal reflux  Active Problems:    LIBBY (obstructive sleep apnea)    Nocturnal hypoxia    Acute respiratory failure with  hypoxia and hypercapnia (CMS/HCC)    Abdominal pain    Bilateral lower lobe pneumonia suggestive of aspiration  Abdominal distention and discomfort    Acute/chronic hypercapnic hypoxic respiratory insufficiency    PLAN:  -ABG reviewed, cont with AVAPs.  Repeat ABG one hour after AVAPs placed  -wean FIO2 for sats 92%  -CXR reviewed, right basilar atelectasis, consolidation in left lung base  -currently on Zosyn  -s/p abd sx repair of large hiatal hernia 12/30/20  -encourage IS/Flutter when appropriate   -general sx following  -CT chest and CT abd/pelvis WO ordered, will follow results  -place F/C  -home medication per primary      Code status:  Full         HOSPITAL MEDICATIONS     SCHEDULED MEDICATIONS:  amLODIPine, 10 mg, Oral, Daily With Lunch  budesonide-formoterol, 2 puff, Inhalation, BID - RT  docusate sodium, 100 mg, Oral, BID  ferrous sulfate, 324 mg, Oral, Once per day on Tue Thu Sat  guaiFENesin, 600 mg, Oral, Q12H  ipratropium-albuterol, 3 mL, Nebulization, Q4H - RT  levothyroxine, 100 mcg, Oral, QAM AC  losartan, 100 mg, Oral, Daily  mirtazapine, 7.5 mg, Oral, Nightly  pantoprazole, 40 mg, Oral, Q AM  piperacillin-tazobactam, 3.375 g, Intravenous, Q8H  rosuvastatin, 10 mg, Oral, Nightly  sertraline, 100 mg, Oral, Nightly  sucralfate, 1 g, Oral, 4x Daily AC & at Bedtime         CONTINUOUS INFUSIONS:    lactated ringers, 9 mL/hr, Last Rate: Stopped (12/30/20 2009)  phenylephrine, 0.5-3 mcg/kg/min  sodium chloride, 100 mL/hr  sodium chloride, 100 mL/hr, Last Rate: Stopped (12/31/20 0222)         PRN MEDICATIONS:   •  acetaminophen **OR** acetaminophen  •  HYDROcodone-acetaminophen  •  HYDROmorphone **AND** naloxone  •  influenza vaccine  •  lactated ringers  •  metoclopramide  •  [DISCONTINUED] Morphine **AND** naloxone  •  ondansetron **OR** ondansetron  •  oxyCODONE  •  phenylephrine  •  promethazine **OR** promethazine       OBJECTIVE     VITAL SIGNS:  /69 (BP Location: Left arm, Patient Position:  "Lying)   Pulse 88   Temp 97.7 °F (36.5 °C) (Oral)   Resp 24   Ht 157.5 cm (62\")   Wt 83.6 kg (184 lb 4.9 oz)   SpO2 92%   BMI 33.71 kg/m²     Wt Readings from Last 3 Encounters:   12/31/20 83.6 kg (184 lb 4.9 oz)   12/03/20 85.7 kg (189 lb)   11/23/20 85.6 kg (188 lb 11.4 oz)       INTAKE/OUTPUT:    Intake/Output Summary (Last 24 hours) at 1/1/2021 0013  Last data filed at 12/31/2020 2023  Gross per 24 hour   Intake 840 ml   Output 1050 ml   Net -210 ml       PHYSICAL EXAM:   Constitutional:  Well developed, well nourished, no acute distress, non-toxic appearance   Eyes:  PERRL, conjunctiva normal, EOMI   HENT:  Atraumatic, external ears normal, nose normal, oropharynx moist, no pharyngeal exudates. Neck-normal range of motion, no tenderness, supple, trachea midline  Respiratory:  Clear to diminished bilateral breath sounds, no wheezing, shallow breathing, non-labored respirations without accessory muscle use  Cardiovascular:  Normal rate, normal rhythm, no murmurs, no gallops, no rubs   GI:  Firm, distended.  Hypo bowel sounds, no organomegaly, no mass, no rebound, no guarding   :  Deferred    Musculoskeletal:  No edema, no tenderness, no deformities  Integument:  Well hydrated, no rash   Lymphatic:  No lymphadenopathy noted   Neurologic:  Alert & oriented to self, CN 2-12 normal, normal motor function, normal sensory function, no focal deficits noted   Psychiatric:  Difficult to assess      HISTORY     HISTORY:  Past Medical History:   Diagnosis Date   • Acute bronchitis due to human metapneumovirus 2/8/2020   • Anxiety disorder    • Arthritis    • Breast cancer (CMS/[facility]) 02/2019    Invasive ductal carcinoma   • Chronic lymphocytic leukemia (CLL), B-cell (CMS/[facility]) 01/2019   • Depression    • Disease of thyroid gland    • Diverticulosis of colon 2018    Identified on colonoscopy   • Dysphagia 12/2020   • Dyspnea on exertion    • Hemorrhoid    • Hiatal hernia 12/2020   • Hiatal hernia with GERD    • History " of diabetes mellitus     no meds now   • Hyperlipidemia    • Hypertension    • Mycobacterium avium complex (CMS/[facility]) 02/2019    aspiration pneumonia; completed abx therapy   • OAB (overactive bladder)    • Sleep apnea     daughter states pt does not have and doesn't have machine     Past Surgical History:   Procedure Laterality Date   • BLADDER SURGERY     • BRONCHOSCOPY N/A 9/13/2019    Procedure: BRONCHOSCOPY with bronchial washing;  Surgeon: Marnie Frankel MD;  Location: [facility] ENDOSCOPY;  Service: Pulmonary   • COLONOSCOPY  07/19/2018    severe diverticulosis   • CYST REMOVAL      Removed a fatty cyst off of her back   • ENDOSCOPY N/A 11/23/2020    Procedure: ESOPHAGOGASTRODUODENOSCOPY with dilatation (Bougie # 48, 50, 52, 54, 56, 58);  Surgeon: Den Plummer DO;  Location: [facility] ENDOSCOPY;  Service: General;  Laterality: N/A;  Post: large hiatal hernia, joshua's ulcers   • HIATAL HERNIA REPAIR N/A 12/30/2020    Procedure: Laparoscopic hiatal hernia repair with gastropexy;  Surgeon: Den Plummer DO;  Location: [facility] MAIN OR;  Service: General;  Laterality: N/A;   • MASTECTOMY Right 02/04/2019    Invasive ductal carcinoma   • TUBAL ABDOMINAL LIGATION       Family History   Problem Relation Age of Onset   • Diabetes Mother    • Arthritis Other    • Heart disease Other      Social History     Socioeconomic History   • Marital status:      Spouse name: Not on file   • Number of children: Not on file   • Years of education: Not on file   • Highest education level: Not on file   Tobacco Use   • Smoking status: Former Smoker   • Smokeless tobacco: Never Used   Substance and Sexual Activity   • Alcohol use: No     Frequency: Never   • Drug use: No   • Sexual activity: Defer        HOME MEDICATIONS:   Prior to Admission medications    Medication Sig Start Date End Date Taking? Authorizing Provider   amLODIPine (NORVASC) 10 MG tablet Take 1 tablet by mouth Daily With Lunch.  Patient taking differently: Take  10 mg by mouth Every Morning. Take dos 8/18/20  Yes Jemima Fontaine MD   anastrozole (ARIMIDEX) 1 MG tablet Take 1 tablet by mouth Daily.  Patient taking differently: Take 1 mg by mouth Every Morning. Take dos 8/18/20  Yes Jemima Fontaine MD   cetirizine (zyrTEC) 10 MG tablet Take 10 mg by mouth every night at bedtime.   Yes Lala Mcclure MD   chlorthalidone (HYGROTON) 25 MG tablet Take 1 tablet by mouth Daily.  Patient taking differently: Take 25 mg by mouth Daily. Do not take dos 8/18/20  Yes Jemima Fontaine MD   docusate sodium (COLACE) 100 MG capsule Take 100 mg by mouth 2 (Two) Times a Day. 3/19/19  Yes Lala Mcclure MD   ferrous sulfate 324 (65 Fe) MG tablet delayed-release EC tablet Take 324 mg by mouth 3 (Three) Times a Week.   Yes Lala Mcclure MD   ipratropium-albuterol (DUO-NEB) 0.5-2.5 mg/3 ml nebulizer Take 3 mL by nebulization Every 4 (Four) Hours. J 44.9  Patient taking differently: Take 3 mL by nebulization Every 4 (Four) Hours As Needed. J 44.9 2/12/20  Yes Violet Fletcher DO   levothyroxine (SYNTHROID, LEVOTHROID) 100 MCG tablet Take 1 tablet by mouth Every Morning Before Breakfast. Take on empty stomach.  Patient taking differently: Take 100 mcg by mouth Every Morning Before Breakfast. Take on empty stomach.  Take dos 3/3/20  Yes Jemima Fontaine MD   losartan (COZAAR) 100 MG tablet Take 1 tablet by mouth Daily.  Patient taking differently: Take 100 mg by mouth Every Morning. Do not take 24 hours prior to surgery .  LD 12-29 before 1000 8/18/20  Yes Jemima Fontaine MD   metoclopramide (REGLAN) 5 MG tablet Take 1 tablet by mouth 3 (Three) Times a Day As Needed (for nausea and vomiting).  Patient taking differently: Take 5 mg by mouth 3 (Three) Times a Day As Needed (for nausea and vomiting). Lunch, dinner and bedtime 8/18/20  Yes Jemima Fontaine MD   mirtazapine (REMERON) 7.5 MG tablet Take 1 tablet by mouth Every Night. 8/18/20  Yes  Jemima Fontaine MD   pantoprazole (Protonix) 40 MG EC tablet Take 1 tablet by mouth Every Morning Before Breakfast. Take on an empty stomach!  Patient taking differently: Take 40 mg by mouth Every Morning Before Breakfast. Take on an empty stomach!  Take dos 8/18/20  Yes Jemima Fontaine MD   rosuvastatin (CRESTOR) 10 MG tablet Take 1 tablet by mouth Every Night. 8/18/20  Yes Jemima Fontaine MD   sertraline (ZOLOFT) 100 MG tablet Take 1 tablet by mouth Every Night. 8/18/20  Yes Jemima Fontaine MD   sucralfate (CARAFATE) 1 g tablet Take 1 tablet by mouth 4 (Four) Times a Day Before Meals & at Bedtime. 8/18/20  Yes Jemima Fontaine MD   vitamin D (ERGOCALCIFEROL) 1.25 MG (65393 UT) capsule capsule Take 1 capsule by mouth Every 7 (Seven) Days.  Patient taking differently: Take 50,000 Units by mouth Every 7 (Seven) Days. Monday.  Hold 12-28 dose 11/1/20  Yes Jemima Fontaine MD   HYDROcodone-acetaminophen (Norco) 7.5-325 MG per tablet Take 1 tablet by mouth Every 4 (Four) Hours As Needed for Moderate Pain . 12/30/20   Den Plummer DO       ALLERGIES:  Patient has no known allergies.      RESULTS     LABS:  Lab Results (last 24 hours)     Procedure Component Value Units Date/Time    CBC & Differential [[id number]]  (Abnormal) Collected: 12/31/20 0205    Specimen: Blood Updated: 12/31/20 0404    Narrative:      The following orders were created for panel order CBC & Differential.  Procedure                               Abnormality         Status                     ---------                               -----------         ------                     Scan Slide[[id number]]                                       Final result               CBC Auto Differential[[id number]]        Abnormal            Final result                 Please view results for these tests on the individual orders.    Basic Metabolic Panel [[id number]]  (Abnormal) Collected: 12/31/20 0205    Specimen: Blood Updated:  12/31/20 0300     Glucose 140 mg/dL      BUN 26 mg/dL      Creatinine 1.19 mg/dL      Sodium 140 mmol/L      Potassium 4.2 mmol/L      Chloride 106 mmol/L      CO2 25.0 mmol/L      Calcium 7.8 mg/dL      eGFR Non African Amer 44 mL/min/1.73      BUN/Creatinine Ratio 21.8     Anion Gap 9.0 mmol/L     Narrative:      GFR Normal >60  Chronic Kidney Disease <60  Kidney Failure <15      CBC Auto Differential [[id number]]  (Abnormal) Collected: 12/31/20 0205    Specimen: Blood Updated: 12/31/20 0404     WBC 33.30 10*3/mm3      RBC 3.81 10*6/mm3      Hemoglobin 9.7 g/dL      Hematocrit 32.2 %      MCV 84.5 fL      MCH 25.5 pg      MCHC 30.2 g/dL      RDW 15.8 %      RDW-SD 47.3 fl      MPV 9.4 fL      Platelets 223 10*3/mm3     Narrative:      The previously reported component NRBC is no longer being reported. Previous result was 0.2 /100 WBC (Reference Range: 0.0-0.2 /100 WBC) on 12/31/2020 at 0241 EST.    Scan Slide [[id number]] Collected: 12/31/20 0205    Specimen: Blood Updated: 12/31/20 0404     Scan Slide --     Comment: See Manual Differential Results       Manual Differential [[id number]]  (Abnormal) Collected: 12/31/20 0205    Specimen: Blood Updated: 12/31/20 0404     Neutrophil % 22.0 %      Lymphocyte % 67.0 %      Monocyte % 5.0 %      Bands %  5.0 %      Atypical Lymphocyte % 1.0 %      Neutrophils Absolute 8.99 10*3/mm3      Lymphocytes Absolute 22.31 10*3/mm3      Monocytes Absolute 1.67 10*3/mm3      Anisocytosis Slight/1+     Smudge Cells Mod/2+     Large Platelets Slight/1+    Path Consult Reflex [[id number]] Collected: 12/31/20 0205    Specimen: Blood Updated: 12/31/20 0404     Pathology Review Yes    Pathology Consultation [[id number]] Collected: 12/31/20 0205    Specimen: Blood, Venous Line Updated: 12/31/20 0832     Final Diagnosis --     Leukocytosis with absolute atypical lymphocytosis  Anemia  No blasts identified  If CBC indices persist/worsen, consider flow cytometry or other studies as clinically  indicated to exclude a lymphoproliferative disorder       Case Report --     Surgical Pathology Report                         Case: [id number]                                  Authorizing Provider:  Bob Lewis DO          Collected:           12/31/2020 02:05 AM          [facility]       Received:            12/31/2020 08:17 AM                                 SURGICAL INPATIENT                                                           Pathologist:           Frederick Celeste MD                                                            Specimen:    Blood, Venous Line                                                                         POC Glucose Once [[id number]]  (Abnormal) Collected: 12/31/20 0818    Specimen: Blood Updated: 12/31/20 0818     Glucose 130 mg/dL      Comment: Serial Number: [id number]:  [id number]       POC Glucose Once [[id number]]  (Abnormal) Collected: 12/31/20 1127    Specimen: Blood Updated: 12/31/20 1132     Glucose 143 mg/dL      Comment: Serial Number: [id number]:  [id number]       POC Glucose Once [[id number]]  (Abnormal) Collected: 12/31/20 1623    Specimen: Blood Updated: 12/31/20 1623     Glucose 140 mg/dL      Comment: Serial Number: [id number]:  [id number]       POC Glucose Once [[id number]]  (Abnormal) Collected: 12/31/20 2025    Specimen: Blood Updated: 12/31/20 2026     Glucose 134 mg/dL      Comment: Serial Number: [id number]:  [id number]               MICRO:  Microbiology Results (last 10 days)     Procedure Component Value - Date/Time    COVID-19,[facility] IN-HOUSE, NP/OP SWAB IN UTM/VTM/[facility]SPORT MEDIA,24 HR TAT - Swab, Nasopharynx [[id number]]  (Normal) Collected: 12/28/20 1227    Lab Status: Final result Specimen: Swab from Nasopharynx Updated: 12/28/20 2046     COVID19 Not Detected    Narrative:      Fact sheet for providers: https://www.fda.gov/media/618955/download     Fact sheet for patients:  https://www.fda.gov/media/534886/download    Test performed by PCR.            RADIOLOGY STUDIES:  Imaging Results (Last 72 Hours)     Procedure Component Value Units Date/Time    XR Chest 1 View [[id number]] Collected: 12/31/20 0909     Updated: 12/31/20 0915    Narrative:      DATE OF EXAM:  12/31/2020 8:57 AM     PROCEDURE:  XR CHEST 1 VW-     INDICATIONS:  hypoxia; K21.2-Ygusau-pakxvfmznl reflux disease without esophagitis;  K44.9-Diaphragmatic hernia without obstruction or gangrene     COMPARISON:  12/18/2020     TECHNIQUE:   Single radiographic AP view of the chest was obtained.     FINDINGS:  Cardiac size is enlarged. There is consolidation in the left lower lobe  in the retrocardiac region. There is a large hiatal hernia. There is new  increased density in the right base which may merely represent  atelectasis. The upper lobes are clear.        Impression:         1. Consolidation in the left lung base which appears new.  2. Lower lung volumes with right basilar atelectasis.  3. Cardiomegaly without acute cardiac decompensation.  4. Right basilar atelectasis.     Electronically Signed By-Den Connell MD On:12/31/2020 9:10 AM  This report was finalized on [id number] by  Den Connell MD.                           I reviewed the patient's new clinical results.    I discussed the patient's findings and my recommendations with patient, family and nursing staff.  I have discussed plan with on-call attending physician, who agrees with this plan of care.    Appropriate PPE worn during assessment of patient per established guidelines.      Electronically signed by SIOMARA Guzman, 01/01/21, 12:13 AM EST.

## 2021-01-01 NOTE — PROGRESS NOTES
Received a call from the patient's bedside nurse that the patient was requiring persistent flow oxygen to maintain her saturation above 90%.  Patient was evaluated and noted to have moderately distended tender abdomen.  The patient is only pulling about 750 cc on incentive spirometry and complains of a great deal of pain with inspiration.  The patient is noted to have a history of MAC and is followed by pulmonology, Dr. Frankel as an outpatient.  At this point with the patient requiring significant amount of oxygen to maintain her saturation I added Dr. Frankel pulmonary consult.  Okay to use precision flow until pulmonology contacted.  Pain medicine to get the patient's pain under control and work with incentive spirometry to increase volume.  The patient does not feel like she is short of breath but clearly desats with any exertion.  The patient feels like her abdomen is distended and she is having a great deal of pain all over.  I discussed with the patient's bedside nurse that I think it is in the patient's best interest to get her pain under control so that she can take deeper breaths.  There is a concern that ordered pain medication may decrease respiratory drive.  We will follow the patient closely.

## 2021-01-01 NOTE — PROGRESS NOTES
Fast call at 0200 For worsening shortness of breath, hypoxia.  The patient is still reluctant to wear BiPAP/AVAPS mask.  Patient's oxygen saturation is in the 80s.  Patient is still moving minimal amount of air.  Abdomen continues to look distended and is now more firm.  Patient is finally agreeable to wear BiPAP/AVAPS if absolutely necessary.  Ativan 0.5 mg given to facilitate patient compliance.  Patient will be taken for CT abdomen and pelvis without contrast to evaluate because of distended abdomen.  If abdomen needs to be decompressed, will contact Dr. Plummer prior to placement of any tubes.  The patient daughter reports the patient has a history of MAC following aspiration after right mastectomy 2 years ago.

## 2021-01-01 NOTE — PLAN OF CARE
Problem: Adult Inpatient Plan of Care  Goal: Plan of Care Review  Outcome: Ongoing, Progressing  Goal: Patient-Specific Goal (Individualized)  Outcome: Ongoing, Progressing  Goal: Absence of Hospital-Acquired Illness or Injury  Outcome: Ongoing, Progressing  Intervention: Identify and Manage Fall Risk  Recent Flowsheet Documentation  Taken 1/1/2021 1200 by Lisa Hameed RN  Safety Promotion/Fall Prevention: safety round/check completed  Taken 1/1/2021 0745 by Lisa Hameed RN  Safety Promotion/Fall Prevention: safety round/check completed  Intervention: Prevent Skin Injury  Recent Flowsheet Documentation  Taken 1/1/2021 0745 by Lisa Hameed RN  Body Position: position maintained  Goal: Optimal Comfort and Wellbeing  Outcome: Ongoing, Progressing  Intervention: Provide Person-Centered Care  Recent Flowsheet Documentation  Taken 1/1/2021 0745 by Lisa Hameed RN  Trust Relationship/Rapport:   care explained   questions answered  Goal: Readiness for Transition of Care  Outcome: Ongoing, Progressing     Problem: Pain Acute  Goal: Optimal Pain Control  Outcome: Ongoing, Progressing     Problem: Bleeding (Surgery Nonspecified)  Goal: Absence of Bleeding  Outcome: Ongoing, Progressing     Problem: Bowel Elimination Impaired (Surgery Nonspecified)  Goal: Effective Bowel Elimination  Outcome: Ongoing, Progressing     Problem: Infection (Surgery Nonspecified)  Goal: Absence of Infection Signs and Symptoms  Outcome: Ongoing, Progressing     Problem: Ongoing Anesthesia Effects (Surgery Nonspecified)  Goal: Anesthesia/Sedation Recovery  Outcome: Ongoing, Progressing  Intervention: Optimize Anesthesia Recovery  Recent Flowsheet Documentation  Taken 1/1/2021 1200 by Lisa Hameed RN  Safety Promotion/Fall Prevention: safety round/check completed  Taken 1/1/2021 0745 by Lisa Hameed RN  Safety Promotion/Fall Prevention: safety round/check completed     Problem: Pain (Surgery Nonspecified)  Goal:  Acceptable Pain Control  Outcome: Ongoing, Progressing     Problem: Postoperative Nausea and Vomiting (Surgery Nonspecified)  Goal: Nausea and Vomiting Relief  Outcome: Ongoing, Progressing     Problem: Postoperative Urinary Retention (Surgery Nonspecified)  Goal: Effective Urinary Elimination  Outcome: Ongoing, Progressing   Goal Outcome Evaluation:  Plan of Care Reviewed With: patient  Progress: no change

## 2021-01-01 NOTE — PROGRESS NOTES
GENERAL SURGERY PROGRESS NOTE    1/1/2021   LOS: 1 day   Patient Care Team:  Jemima Fontaine MD as PCP - General (Family Medicine)    HIATAL HERNIA REPAIR LAPAROSCOPIC  12/30/2020  Chief Complaint: giant hiatal hernia with pulmonary compromise  Subjective   HPI: Patient is a 80 year old lady s/p laparoscopic hiatal hernia repair with gastropexy for giant hiatal hernia with pulmonary compromise on 12/30/2020.     Interval History:   Patient complained of abdominal pain and distention overnight with difficulty breathing.  A fast team was called, and laboratory values were obtained which demonstrated hypoxemia and hypercapnia.  The patient was transferred to PCU for close observation and increased pulmonary support.    CT scans of the chest, abdomen and pelvis were obtained.  No official read is back on the CT scan of the abdomen and pelvis, however I note a distended stomach with relatively decompressed small bowel and colonic stool throughout the entire abdomen.  No evidence of obstruction.  The esophagus does appear to be displaced slightly anterior and to the left near the GE junction which could make nasogastric tube placement somewhat difficult.  Also noted the Bellamy catheter was within the vagina and not within the bladder which was mildly distended with urine.    Objective     Vital Signs  Temp:  [97.7 °F (36.5 °C)-98.6 °F (37 °C)] 98.6 °F (37 °C)  Heart Rate:  [70-88] 78  Resp:  [16-41] 24  BP: (123-177)/(63-95) 128/65    Physical Exam  Vitals signs reviewed.   Constitutional:       General: She is in acute distress.      Appearance: She is not toxic-appearing.   Cardiovascular:      Rate and Rhythm: Normal rate and regular rhythm.   Pulmonary:      Effort: Tachypnea present.      Breath sounds: Normal breath sounds.      Comments: On high flow nasal cannula  Abdominal:      Comments: The abdomen is soft, appropriately tender, incisions are well approximated with dressings overlying the skin incisions.   There is no surrounding erythema, induration, or drainage to suggest infection.  The abdomen is moderately distended.   Neurological:      Mental Status: She is alert.   Psychiatric:         Mood and Affect: Mood normal.         Behavior: Behavior normal.          Results Review:    Lab Results (last 24 hours)     Procedure Component Value Units Date/Time    Blood Gas, Arterial - [[id number]]  (Abnormal) Collected: 01/01/21 0742    Specimen: Arterial Blood Updated: 01/01/21 0748     Site Left Radial     Sai's Test Positive     pH, Arterial 7.345 pH units      pCO2, Arterial 51.2 mm Hg      pO2, Arterial 164.1 mm Hg      HCO3, Arterial 27.9 mmol/L      Base Excess, Arterial 1.6 mmol/L      Comment: Serial Number: [id number]:  [id number]        O2 Saturation, Arterial 99.4 %      CO2 Content 29.5 mmol/L      Barometric Pressure for Blood Gas --     Comment: N/A        Modality BiPap     FIO2 100 %      Ventilator Mode ;AVAPS     Set Tidal Volume 500     PEEP 9     Hemodilution No     Respiratory Rate 24    CBC & Differential [[id number]]  (Abnormal) Collected: 01/01/21 0451    Specimen: Blood Updated: 01/01/21 0701    Narrative:      The following orders were created for panel order CBC & Differential.  Procedure                               Abnormality         Status                     ---------                               -----------         ------                     Scan Slide[[id number]]                                       Final result               CBC Auto Differential[[id number]]        Abnormal            Final result                 Please view results for these tests on the individual orders.    CBC Auto Differential [[id number]]  (Abnormal) Collected: 01/01/21 0451    Specimen: Blood Updated: 01/01/21 0701     WBC 17.60 10*3/mm3      RBC 3.79 10*6/mm3      Hemoglobin 9.7 g/dL      Hematocrit 32.1 %      MCV 84.7 fL      MCH 25.7 pg      MCHC 30.4 g/dL      RDW 16.2 %      RDW-SD 48.1 fl      MPV 9.6 fL       Platelets 179 10*3/mm3     Narrative:      The previously reported component NRBC is no longer being reported. Previous result was 0.1 /100 WBC (Reference Range: 0.0-0.2 /100 WBC) on 1/1/2021 at 0515 EST.    Scan Slide [[id number]] Collected: 01/01/21 0451    Specimen: Blood Updated: 01/01/21 0701     Scan Slide --     Comment: See Manual Differential Results       Manual Differential [[id number]]  (Abnormal) Collected: 01/01/21 0451    Specimen: Blood Updated: 01/01/21 0701     Neutrophil % 27.0 %      Lymphocyte % 61.0 %      Monocyte % 3.0 %      Eosinophil % 2.0 %      Bands %  7.0 %      Neutrophils Absolute 5.98 10*3/mm3      Lymphocytes Absolute 10.74 10*3/mm3      Monocytes Absolute 0.53 10*3/mm3      Eosinophils Absolute 0.35 10*3/mm3      Anisocytosis Slight/1+     Smudge Cells Slight/1+     Platelet Morphology Normal    Narrative:      Reviewed by Pathologist within the past 30 days on 12/31/2020 .    Basic Metabolic Panel [[id number]]  (Abnormal) Collected: 01/01/21 0451    Specimen: Blood Updated: 01/01/21 0555     Glucose 137 mg/dL      BUN 25 mg/dL      Creatinine 1.30 mg/dL      Sodium 134 mmol/L      Potassium 4.4 mmol/L      Chloride 100 mmol/L      CO2 27.0 mmol/L      Calcium 8.9 mg/dL      eGFR Non African Amer 40 mL/min/1.73      BUN/Creatinine Ratio 19.2     Anion Gap 7.0 mmol/L     Narrative:      GFR Normal >60  Chronic Kidney Disease <60  Kidney Failure <15      Blood Gas, Arterial - [[id number]]  (Abnormal) Collected: 01/01/21 0328    Specimen: Arterial Blood Updated: 01/01/21 0332     Site Right Radial     Sai's Test Positive     pH, Arterial 7.307 pH units      pCO2, Arterial 55.3 mm Hg      pO2, Arterial 93.9 mm Hg      HCO3, Arterial 27.6 mmol/L      Base Excess, Arterial 0.6 mmol/L      Comment: Serial Number: [id number]:  [id number]        O2 Saturation, Arterial 96.3 %      CO2 Content 29.3 mmol/L      Barometric Pressure for Blood Gas --     Comment: N/A        Modality BiPap      FIO2 100 %      Ventilator Mode ;BIVENT     Set Tidal Volume 500     PEEP 9     Hemodilution No     Respiratory Rate 25    Blood Gas, Arterial - [[id number]]  (Abnormal) Collected: 01/01/21 0058    Specimen: Arterial Blood Updated: 01/01/21 0122     Site Left Radial     Sai's Test Positive     pH, Arterial 7.265 pH units      pCO2, Arterial 63.0 mm Hg      pO2, Arterial 55.8 mm Hg      HCO3, Arterial 28.6 mmol/L      Base Excess, Arterial 0.3 mmol/L      Comment: Serial Number: [id number]:  [id number]        O2 Saturation, Arterial 83.0 %      CO2 Content 30.6 mmol/L      Barometric Pressure for Blood Gas --     Comment: N/A        Modality Cannula     FIO2 100 %      Hemodilution No     Respiratory Rate 28    POC Glucose Once [[id number]]  (Abnormal) Collected: 01/01/21 0105    Specimen: Blood Updated: 01/01/21 0106     Glucose 126 mg/dL      Comment: Serial Number: [id number]:  [id number]       POC Glucose Once [[id number]]  (Abnormal) Collected: 12/31/20 2025    Specimen: Blood Updated: 12/31/20 2026     Glucose 134 mg/dL      Comment: Serial Number: [id number]:  [id number]       POC Glucose Once [[id number]]  (Abnormal) Collected: 12/31/20 1623    Specimen: Blood Updated: 12/31/20 1623     Glucose 140 mg/dL      Comment: Serial Number: [id number]:  [id number]       POC Glucose Once [[id number]]  (Abnormal) Collected: 12/31/20 1127    Specimen: Blood Updated: 12/31/20 1132     Glucose 143 mg/dL      Comment: Serial Number: [id number]:  [id number]           Imaging Results (Last 24 Hours)     Procedure Component Value Units Date/Time    CT Chest Without Contrast [[id number]] Collected: 01/01/21 0122     Updated: 01/01/21 0333    Narrative:      Addendum: I notified the nurse taking care of the patientBelinda of the Bellamy catheter malposition at 0131 hours.    Electronically signed by:  Vinicius Richardson M.D.    1/1/2021 1:32 AM    CT Abdomen Pelvis Without Contrast [[id number]] Collected:  01/01/21 0122     Updated: 01/01/21 0333    Narrative:      Addendum: I notified the nurse taking care of the patient, Belinda of the Bellamy catheter malposition at 0131 hours.    Electronically signed by:  Vinicius Richardson M.D.    1/1/2021 1:32 AM           I have reviewed the above results and noted them below    Medication Review:    Current Facility-Administered Medications:   •  acetaminophen (TYLENOL) tablet 650 mg, 650 mg, Oral, Q4H PRN **OR** acetaminophen (TYLENOL) suppository 650 mg, 650 mg, Rectal, Q4H PRN, Den Plummer, DO  •  amLODIPine (NORVASC) tablet 10 mg, 10 mg, Oral, Daily With Lunch, Bob Lewis DO, 10 mg at 12/31/20 1301  •  budesonide-formoterol (SYMBICORT) 160-4.5 MCG/ACT inhaler 2 puff, 2 puff, Inhalation, BID - RT, Bob Lewis DO, 2 puff at 12/31/20 2051  •  docusate sodium (COLACE) capsule 100 mg, 100 mg, Oral, BID, Bob Lewis DO, 100 mg at 12/31/20 2002  •  ferrous sulfate EC tablet 324 mg, 324 mg, Oral, Once per day on Tue Thu Sat, Bob Lewis DO, 324 mg at 12/31/20 0901  •  guaiFENesin (MUCINEX) 12 hr tablet 600 mg, 600 mg, Oral, Q12H, Bob Lewis DO, 600 mg at 12/31/20 2002  •  HYDROcodone-acetaminophen (NORCO) 5-325 MG per tablet 1 tablet, 1 tablet, Oral, Q4H PRN, Den Plummer DO, 1 tablet at 12/31/20 1710  •  influenza vac split quad (FLUZONE,FLUARIX,AFLURIA,FLULAVAL) injection 0.5 mL, 0.5 mL, Intramuscular, During Hospitalization, Bob Lewis DO  •  ipratropium-albuterol (DUO-NEB) nebulizer solution 3 mL, 3 mL, Nebulization, Q4H - RT, Bob Lewis DO, 3 mL at 01/01/21 1028  •  lactated ringers infusion, 9 mL/hr, Intravenous, Continuous PRN, Den Plummer DO, Stopped at 12/30/20 2009  •  levothyroxine (SYNTHROID, LEVOTHROID) tablet 100 mcg, 100 mcg, Oral, QAM AC, Bob Lewis, , 100 mcg at 12/31/20 0902  •  metoclopramide (REGLAN) tablet 5 mg, 5 mg, Oral, TID PRN, Bob Lewis, DO  •  Morphine sulfate (PF) injection 2 mg, 2 mg, Intravenous, Q3H  PRN, Bob Lewis, DO  •  [DISCONTINUED] HYDROmorphone (DILAUDID) injection 0.5 mg, 0.5 mg, Intravenous, Q2H PRN, 0.5 mg at 12/31/20 2259 **AND** naloxone (NARCAN) injection 0.1 mg, 0.1 mg, Intravenous, Q5 Min PRN, Den Plummer, DO  •  [DISCONTINUED] Morphine sulfate (PF) injection 4 mg, 4 mg, Intravenous, Q2H PRN **AND** naloxone (NARCAN) injection 0.4 mg, 0.4 mg, Intravenous, Q5 Min PRN, Den Plummer, DO  •  ondansetron (ZOFRAN) tablet 4 mg, 4 mg, Oral, Q6H PRN **OR** ondansetron (ZOFRAN) injection 4 mg, 4 mg, Intravenous, Q6H PRN, Den Plummer, DO  •  oxyCODONE (ROXICODONE) immediate release tablet 10 mg, 10 mg, Oral, Q4H PRN, Den Plummer, DO, 10 mg at 01/01/21 0907  •  pantoprazole (PROTONIX) EC tablet 40 mg, 40 mg, Oral, Q AM, Den Plummer, DO  •  phenylephrine (AUDRA-SYNEPHRINE) 50 mg in 250 mL NS infusion, 0.5-3 mcg/kg/min, Intravenous, Continuous PRN, Den Plummer, DO  •  piperacillin-tazobactam (ZOSYN) IVPB 3.375 g in 100 mL NS (CD), 3.375 g, Intravenous, Q8H, Bob Lewis, DO, 3.375 g at 01/01/21 0609  •  promethazine (PHENERGAN) tablet 12.5 mg, 12.5 mg, Oral, Q6H PRN **OR** promethazine (PHENERGAN) suppository 12.5 mg, 12.5 mg, Rectal, Q6H PRN, Den Plummer, DO  •  risperiDONE (risperDAL M-TABS) disintegrating tablet 0.25 mg, 0.25 mg, Oral, Nightly PRN, Bob Lewis, DO  •  rosuvastatin (CRESTOR) tablet 10 mg, 10 mg, Oral, Nightly, McBain, Bob M, DO, 10 mg at 12/31/20 2002  •  sucralfate (CARAFATE) tablet 1 g, 1 g, Oral, 4x Daily AC & at Bedtime, Bob Lewis DO, 1 g at 12/31/20 2002    Assessment/Plan     Principal Problem:    Hiatal hernia with gastroesophageal reflux  Active Problems:    LIBBY (obstructive sleep apnea)    Nocturnal hypoxia    Acute respiratory failure with hypoxia and hypercapnia (CMS/HCC)    Abdominal pain    Patient postop day 2 status post laparoscopic hiatal hernia repair with gastropexy  Having some respiratory issues postoperatively.    I reviewed her  images, do not think that a nasogastric tube is indicated at this time.  Also I think placement would be technically difficult given the appearance and tortuous nature of her lower esophageal section.  Continue n.p.o. with sips w/ meds  Continue pulmonary support and wean oxygen as able.  Incentive spirometer, and up out of bed into chair as able  Continue current bowel regimen and support with antiemetics  CT scan of the abdomen has what I would consider to be a normal postoperative appearance given the surgery which she just underwent.  White blood cell count decreasing as well.    Den Miller MD  01/01/21  10:33 EST

## 2021-01-01 NOTE — PROGRESS NOTES
"      [facility] Medicine Services Daily Progress Note      Hospitalist Team  LOS 1 days      Patient Care Team:  Jemima Fontaine MD as PCP - General (Family Medicine)    Patient Location: 2105/1      Subjective   Subjective     Chief Complaint / Subjective  No chief complaint on file.  follow up shortness of breath       Brief Synopsis of Hospital Course/HPI     80-year-old female who was having issues with GERD was found to have hiatal hernia and is underwent fixation per general surgery.  She has been admitted postoperatively medicine has been consulted to help with her medical problems.  Her blood pressure stable she is having some apnea spells from the sedation but overall her breathing is stable.  No other issues.  As reported she is slightly sedated from the procedure and HPI was slightly limited.         Date::    12/31/20: had worsening respiratory status last night and has been on HFNC this am. Refusing to wear BIPAP although likely needs it fo LIBBY.    1/1/20: on BIPAP this am and thus HPI limited. Events of overnight noted was moved to higher level of care. O2 is stable today. Seems O2 worse when not deep breathing and when sleeping as has LIBBY.     Review of Systems   Respiratory: Positive for shortness of breath. Negative for cough.    Gastrointestinal: Positive for abdominal pain.         Objective   Objective      Vital Signs  Temp:  [97.7 °F (36.5 °C)-98.6 °F (37 °C)] 98.4 °F (36.9 °C)  Heart Rate:  [70-89] 86  Resp:  [16-41] 20  BP: (123-177)/(48-95) 126/48  Oxygen Therapy  SpO2: 90 %  Pulse Oximetry Type: Continuous  Device (Oxygen Therapy): heated, high-flow nasal cannula, humidified  $ High Flow Nasal Cannula Set-Up: yes  Flow (L/min): 30  Oxygen Concentration (%): 85  Flowsheet Rows      First Filed Value   Admission Height  157.5 cm (62\") Documented at 12/16/2020 1444   Admission Weight  81.6 kg (180 lb) Documented at 12/16/2020 1444        Intake & Output (last 3 days)  "      12/29 0701 - 12/30 0700 12/30 0701 - 12/31 0700 12/31 0701 - 01/01 0700 01/01 0701 - 01/02 0700    P.O.  200 840     I.V. (mL/kg)  1200 (14.4)      Total Intake(mL/kg)  1400 (16.7) 840 (10)     Urine (mL/kg/hr)  850 750 (0.4) 1000 (1.8)    Total Output      Net  +550 +90 -1000            Urine Unmeasured Occurrence   1 x         Lines, Drains & Airways    Active LDAs     Name:   Placement date:   Placement time:   Site:   Days:    Peripheral IV 12/31/20 0938 Anterior;Left Forearm   12/31/20    0938    Forearm   less than 1                  Physical Exam:    Physical Exam  Constitutional:       General: She is not in acute distress.  Eyes:      Pupils: Pupils are equal, round, and reactive to light.   Cardiovascular:      Rate and Rhythm: Normal rate.   Pulmonary:      Effort: No respiratory distress.      Breath sounds: No wheezing or rales.   Abdominal:      General: There is distension.      Palpations: Abdomen is soft.      Comments: TTP over the surgical site    Skin:     General: Skin is warm and dry.   Neurological:      Mental Status: She is alert.   Psychiatric:      Comments: anxious              Wounds (last 24 hours)      LDA Wound     Row Name 01/01/21 1200 01/01/21 0745 01/01/21 0325       Wound 12/30/20 1037 abdomen Incision    Wound - Properties Group Placement Date: 12/30/20  -AM Placement Time: 1037  -AM Location: abdomen  -AM Primary Wound Type: Incision  -AM    Dressing Appearance  --  dry;intact  -SH  --    Closure  BRIANNE  -SH  BRIANNE  -SH  BRIANNE  -KK    Base  dressing in place, unable to visualize  -SH  dressing in place, unable to visualize  -SH  dressing in place, unable to visualize  -KK    Retired Wound - Properties Group Date first assessed: 12/30/20  -AM Time first assessed: 1037  -AM Location: abdomen  -AM Primary Wound Type: Incision  -AM    Row Name 12/31/20 1922 12/31/20 1550          Wound 12/30/20 1037 abdomen Incision    Wound - Properties Group Placement Date: 12/30/20   -AM Placement Time: 1037  -AM Location: abdomen  -AM Primary Wound Type: Incision  -AM    Dressing Appearance  dry;intact  -CP  dry;intact  -MI     Closure  BRIANNE  -CP  BRIANNE  -MI     Base  dressing in place, unable to visualize  -CP  dressing in place, unable to visualize  -MI     Retired Wound - Properties Group Date first assessed: 12/30/20  -AM Time first assessed: 1037  -AM Location: abdomen  -AM Primary Wound Type: Incision  -AM      User Key  (r) = Recorded By, (t) = Taken By, (c) = Cosigned By    Initials Name Provider Type    AM Ping Martinez, RN Registered Nurse    Lisa Alfonso RN Registered Nurse    Ubaldo Rodriguez RN Registered Nurse    Nancy Layne RN Registered Nurse    Flores Alva RN Registered Nurse          Procedures:    Procedure(s):  Laparoscopic hiatal hernia repair with gastropexy          Results Review:     I reviewed the patient's new clinical results.      Lab Results (last 24 hours)     Procedure Component Value Units Date/Time    Blood Gas, Arterial - [[id number]]  (Abnormal) Collected: 01/01/21 0742    Specimen: Arterial Blood Updated: 01/01/21 0748     Site Left Radial     Sai's Test Positive     pH, Arterial 7.345 pH units      pCO2, Arterial 51.2 mm Hg      pO2, Arterial 164.1 mm Hg      HCO3, Arterial 27.9 mmol/L      Base Excess, Arterial 1.6 mmol/L      Comment: Serial Number: [id number]:  [id number]        O2 Saturation, Arterial 99.4 %      CO2 Content 29.5 mmol/L      Barometric Pressure for Blood Gas --     Comment: N/A        Modality BiPap     FIO2 100 %      Ventilator Mode ;AVAPS     Set Tidal Volume 500     PEEP 9     Hemodilution No     Respiratory Rate 24    CBC & Differential [[id number]]  (Abnormal) Collected: 01/01/21 0451    Specimen: Blood Updated: 01/01/21 0701    Narrative:      The following orders were created for panel order CBC & Differential.  Procedure                               Abnormality         Status                      ---------                               -----------         ------                     Scan Slide[[id number]]                                       Final result               CBC Auto Differential[[id number]]        Abnormal            Final result                 Please view results for these tests on the individual orders.    CBC Auto Differential [[id number]]  (Abnormal) Collected: 01/01/21 0451    Specimen: Blood Updated: 01/01/21 0701     WBC 17.60 10*3/mm3      RBC 3.79 10*6/mm3      Hemoglobin 9.7 g/dL      Hematocrit 32.1 %      MCV 84.7 fL      MCH 25.7 pg      MCHC 30.4 g/dL      RDW 16.2 %      RDW-SD 48.1 fl      MPV 9.6 fL      Platelets 179 10*3/mm3     Narrative:      The previously reported component NRBC is no longer being reported. Previous result was 0.1 /100 WBC (Reference Range: 0.0-0.2 /100 WBC) on 1/1/2021 at 0515 EST.    Scan Slide [[id number]] Collected: 01/01/21 0451    Specimen: Blood Updated: 01/01/21 0701     Scan Slide --     Comment: See Manual Differential Results       Manual Differential [[id number]]  (Abnormal) Collected: 01/01/21 0451    Specimen: Blood Updated: 01/01/21 0701     Neutrophil % 27.0 %      Lymphocyte % 61.0 %      Monocyte % 3.0 %      Eosinophil % 2.0 %      Bands %  7.0 %      Neutrophils Absolute 5.98 10*3/mm3      Lymphocytes Absolute 10.74 10*3/mm3      Monocytes Absolute 0.53 10*3/mm3      Eosinophils Absolute 0.35 10*3/mm3      Anisocytosis Slight/1+     Smudge Cells Slight/1+     Platelet Morphology Normal    Narrative:      Reviewed by Pathologist within the past 30 days on 12/31/2020 .    Basic Metabolic Panel [[id number]]  (Abnormal) Collected: 01/01/21 0451    Specimen: Blood Updated: 01/01/21 0555     Glucose 137 mg/dL      BUN 25 mg/dL      Creatinine 1.30 mg/dL      Sodium 134 mmol/L      Potassium 4.4 mmol/L      Chloride 100 mmol/L      CO2 27.0 mmol/L      Calcium 8.9 mg/dL      eGFR Non African Amer 40 mL/min/1.73      BUN/Creatinine Ratio 19.2      Anion Gap 7.0 mmol/L     Narrative:      GFR Normal >60  Chronic Kidney Disease <60  Kidney Failure <15      Blood Gas, Arterial - [[id number]]  (Abnormal) Collected: 01/01/21 0328    Specimen: Arterial Blood Updated: 01/01/21 0332     Site Right Radial     Sai's Test Positive     pH, Arterial 7.307 pH units      pCO2, Arterial 55.3 mm Hg      pO2, Arterial 93.9 mm Hg      HCO3, Arterial 27.6 mmol/L      Base Excess, Arterial 0.6 mmol/L      Comment: Serial Number: [id number]:  [id number]        O2 Saturation, Arterial 96.3 %      CO2 Content 29.3 mmol/L      Barometric Pressure for Blood Gas --     Comment: N/A        Modality BiPap     FIO2 100 %      Ventilator Mode ;BIVENT     Set Tidal Volume 500     PEEP 9     Hemodilution No     Respiratory Rate 25    Blood Gas, Arterial - [[id number]]  (Abnormal) Collected: 01/01/21 0058    Specimen: Arterial Blood Updated: 01/01/21 0122     Site Left Radial     Sai's Test Positive     pH, Arterial 7.265 pH units      pCO2, Arterial 63.0 mm Hg      pO2, Arterial 55.8 mm Hg      HCO3, Arterial 28.6 mmol/L      Base Excess, Arterial 0.3 mmol/L      Comment: Serial Number: [id number]:  [id number]        O2 Saturation, Arterial 83.0 %      CO2 Content 30.6 mmol/L      Barometric Pressure for Blood Gas --     Comment: N/A        Modality Cannula     FIO2 100 %      Hemodilution No     Respiratory Rate 28    POC Glucose Once [[id number]]  (Abnormal) Collected: 01/01/21 0105    Specimen: Blood Updated: 01/01/21 0106     Glucose 126 mg/dL      Comment: Serial Number: [id number]:  [id number]       POC Glucose Once [[id number]]  (Abnormal) Collected: 12/31/20 2025    Specimen: Blood Updated: 12/31/20 2026     Glucose 134 mg/dL      Comment: Serial Number: [id number]:  [id number]       POC Glucose Once [[id number]]  (Abnormal) Collected: 12/31/20 1623    Specimen: Blood Updated: 12/31/20 1623     Glucose 140 mg/dL      Comment: Serial Number: [id number]:  [id number]            No results found for: HGBA1C        Results from last 7 days   Lab Units 01/01/21  0742   PH, ARTERIAL pH units 7.345*   PO2 ART mm Hg 164.1*   PCO2, ARTERIAL mm Hg 51.2*   HCO3 ART mmol/L 27.9     No results found for: LIPASE  Lab Results   Component Value Date    CHOL 374 (H) 01/03/2020    TRIG 311 (H) 01/03/2020    HDL 50 01/03/2020     (H) 01/03/2020       Lab Results   Lab Value Date/Time    FINALDX  12/31/2020 0205     Leukocytosis with absolute atypical lymphocytosis  Anemia  No blasts identified  If CBC indices persist/worsen, consider flow cytometry or other studies as clinically indicated to exclude a lymphoproliferative disorder      FINALDX  02/06/2020 1405     Absolute atypical lymphocytosis.  Anemia.  No blasts identified.  Recommend clinical follow-up and additional studies to include flow cytometry as clinically indicated to exclude CLL/SLL or other lymphoproliferative disorder.         Microbiology Results (last 10 days)     Procedure Component Value - Date/Time    COVID-19,APTIMA PANTHER,[facility]E IN-HOUSE, NP/OP SWAB IN UTM/VTM/SALINE TRANSPORT MEDIA,24 HR TAT - Swab, Nasopharynx [[id number]]  (Normal) Collected: 12/28/20 1227    Lab Status: Final result Specimen: Swab from Nasopharynx Updated: 12/28/20 2046     COVID19 Not Detected    Narrative:      Fact sheet for providers: https://www.fda.gov/media/131720/download     Fact sheet for patients: https://www.fda.gov/media/111949/download    Test performed by PCR.          ECG/EMG Results (most recent)     Procedure Component Value Units Date/Time    ECG 12 Lead [[id number]] Collected: 12/30/20 1314     Updated: 12/30/20 1317     QT Interval 421 ms     Narrative:      HEART RATE= 71  bpm  RR Interval= 844  ms  MN Interval= 172  ms  P Horizontal Axis= -9  deg  P Front Axis= 48  deg  QRSD Interval= 101  ms  QT Interval= 421  ms  QRS Axis= 41  deg  T Wave Axis= 77  deg  - OTHERWISE NORMAL ECG -  Sinus rhythm  Low voltage, precordial leads  When  compared with ECG of 18-Dec-2020 14:29:46,  Significant axis, voltage or hypertrophy change  Electronically Signed By:   Date and Time of Study: 2020-12-30 13:14:52                    Xr Chest 1 View    Result Date: 12/31/2020   1. Consolidation in the left lung base which appears new. 2. Lower lung volumes with right basilar atelectasis. 3. Cardiomegaly without acute cardiac decompensation. 4. Right basilar atelectasis.  Electronically Signed By-Den Connell MD On:12/31/2020 9:10 AM This report was finalized on [id number] by  Den Connell MD.          Xrays, labs reviewed personally by physician.    Medication Review:   I have reviewed the patient's current medication list      Scheduled Meds  amLODIPine, 10 mg, Oral, Daily With Lunch  budesonide-formoterol, 2 puff, Inhalation, BID - RT  docusate sodium, 100 mg, Oral, BID  ferrous sulfate, 324 mg, Oral, Once per day on Tue Thu Sat  guaiFENesin, 600 mg, Oral, Q12H  ipratropium-albuterol, 3 mL, Nebulization, Q4H - RT  levothyroxine, 100 mcg, Oral, QAM AC  pantoprazole, 40 mg, Oral, Q AM  piperacillin-tazobactam, 3.375 g, Intravenous, Q8H  rosuvastatin, 10 mg, Oral, Nightly  sucralfate, 1 g, Oral, 4x Daily AC & at Bedtime        Meds Infusions  lactated ringers, 9 mL/hr, Last Rate: Stopped (12/30/20 2009)  phenylephrine, 0.5-3 mcg/kg/min        Meds PRN  •  acetaminophen **OR** acetaminophen  •  HYDROcodone-acetaminophen  •  influenza vaccine  •  lactated ringers  •  metoclopramide  •  Morphine  •  [DISCONTINUED] HYDROmorphone **AND** naloxone  •  [DISCONTINUED] Morphine **AND** naloxone  •  ondansetron **OR** ondansetron  •  oxyCODONE  •  phenylephrine  •  promethazine **OR** promethazine  •  risperiDONE        Assessment/Plan   Assessment/Plan     Active Hospital Problems    Diagnosis  POA   • **Hiatal hernia with gastroesophageal reflux [K21.9, K44.9]  Unknown   • Acute respiratory failure with hypoxia and hypercapnia (CMS/HCC) [J96.01, J96.02]  Unknown   •  Abdominal pain [R10.9]  Unknown   • Nocturnal hypoxia [G47.34]  Yes   • LIBBY (obstructive sleep apnea) [G47.33]  Unknown      Resolved Hospital Problems   No resolved problems to display.       MEDICAL DECISION MAKING COMPLEXITY BY PROBLEM:     Acute on chronic hypoxic respiratory failure, LIBBY  -likely realted to narcotics and LIBBY vs LLL CAP  -CXR 12/31/20: new LLL infiltrate  -CT chest reviewed b/l LL atelectasis vs infiltrate  -Oxygen on precision flow, wean   -was agreeable to AVAP last night, c/w sleeping  -Nebulizers q4, add symbicort  -c/w Zosyn  -encourage aggressive pulmonary toilet  -alter pain regimen for less sedation  -pulmonary now following     Hiatal hernia with GERD   -S/p laparoscopic hiatal hernia repair with gastropexy  -General surgery primary postop per them  -PPI, carafate, reglan prn     Hypertension  -Home meds  -Monitor and adjust blood pressure prn    CLL  -WBC chronically elevated, improved to 17 today (baseline 15-20 range)  -Follow CBC while here    High risk for further respiratory decline    VTE Prophylaxis -   Mechanical Order History:      Ordered        12/30/20 1513  Place Sequential Compression Device  Once         12/30/20 1513  Place Sequential Compression Device  Once         12/30/20 1513  Maintain Sequential Compression Device  Continuous                 Pharmalogical Order History:     None            Code Status -   Code Status and Medical Interventions:   Ordered at: 12/30/20 1513     Code Status:    CPR     Medical Interventions (Level of Support Prior to Arrest):    Full       This patient has been examined wearing appropriate Personal Protective Equipment. 01/01/21        Discharge Planning  Respiratory status will have to improve before d/c         Electronically signed by Bob Lewis DO, 01/01/21, 13:40 EST.  [facility] Hospitalist Team

## 2021-01-01 NOTE — SIGNIFICANT NOTE
abg's were obtained prior to arrival patient was placed on avaps with improvement in O2 satiuration Naomy Pro Np present and order patient IV ativan to help patient adjust to wearng the avap

## 2021-01-01 NOTE — NURSING NOTE
Patient on 15L high flow but consistently satting at around 88-89 and dropping to 83-85 with exertion. Nurse called RT @ patient O2 levels and breathing treatments. RT administered pt breathing treatments which increased pt anxiety. Multiple staff spoke to patient and patient's daughter about using the previously ordered bipap, but the patient refused and became very anxious and agitated with staff. Patient and her daughter started arguing and yelling at each other. Staff  them and attempted to calm the situation. RT put patient on precision flow at 30lpm and 100%. JOVANI Moralez called @ patient condition and Naomy came up to assess patient. Order for pulmonary consult given and call put out. Patient was given dilaudid for pain in the hopes that she would breath deeper since she had been breathing extremely shallowly all night. Intensivist Catia called @ pt condition. ABGs drawn and showed critical levels. Fast team called @ 0200. Patient skin was gray-tinged. Pt was put on AVAp, CT of chest and abdomen obtained, and transferred to PCU. Report called to Belinda. Surgeon notified of patient condition and transfer.

## 2021-01-01 NOTE — NURSING NOTE
Patient taking AVAPs off, screaming, will not leave mask on even after redirection. O2 sats dropping.   Patient trying to hit/ push staff members.  Appears very anxious.    Called MD, Spoke with Naomy CORDOBA, received one time order for Ativan 0.5 mg IV.   Pt tolerated Ativan well, able to relax and leave AVAPS on.

## 2021-01-01 NOTE — NURSING NOTE
Patient improving. She ambulated to chair with assist x 1 to chair. Pt on PF 25/80% and maintaining sats at 93%. Will continue to titrate as tolerated.

## 2021-01-02 ENCOUNTER — APPOINTMENT (OUTPATIENT)
Dept: GENERAL RADIOLOGY | Facility: HOSPITAL | Age: 81
End: 2021-01-02

## 2021-01-02 LAB
ANION GAP SERPL CALCULATED.3IONS-SCNC: 10 MMOL/L (ref 5–15)
ANISOCYTOSIS BLD QL: ABNORMAL
ARTERIAL PATENCY WRIST A: POSITIVE
ATMOSPHERIC PRESS: ABNORMAL MM[HG]
BASE EXCESS BLDA CALC-SCNC: 0.7 MMOL/L (ref 0–3)
BASOPHILS # BLD MANUAL: 0.29 10*3/MM3 (ref 0–0.2)
BASOPHILS NFR BLD AUTO: 2 % (ref 0–1.5)
BDY SITE: ABNORMAL
BUN SERPL-MCNC: 19 MG/DL (ref 8–23)
BUN/CREAT SERPL: 18.4 (ref 7–25)
CALCIUM SPEC-SCNC: 9.1 MG/DL (ref 8.6–10.5)
CHLORIDE SERPL-SCNC: 102 MMOL/L (ref 98–107)
CO2 BLDA-SCNC: 28.3 MMOL/L (ref 22–29)
CO2 SERPL-SCNC: 27 MMOL/L (ref 22–29)
CREAT SERPL-MCNC: 1.03 MG/DL (ref 0.57–1)
DEPRECATED RDW RBC AUTO: 46.8 FL (ref 37–54)
EOSINOPHIL # BLD MANUAL: 0.72 10*3/MM3 (ref 0–0.4)
EOSINOPHIL NFR BLD MANUAL: 5 % (ref 0.3–6.2)
ERYTHROCYTE [DISTWIDTH] IN BLOOD BY AUTOMATED COUNT: 15.8 % (ref 12.3–15.4)
GFR SERPL CREATININE-BSD FRML MDRD: 52 ML/MIN/1.73
GLUCOSE SERPL-MCNC: 112 MG/DL (ref 65–99)
HCO3 BLDA-SCNC: 26.8 MMOL/L (ref 21–28)
HCT VFR BLD AUTO: 31.3 % (ref 34–46.6)
HEMODILUTION: NO
HGB BLD-MCNC: 9.7 G/DL (ref 12–15.9)
INHALED O2 CONCENTRATION: 100 %
LARGE PLATELETS: ABNORMAL
LYMPHOCYTES # BLD MANUAL: 5.62 10*3/MM3 (ref 0.7–3.1)
LYMPHOCYTES NFR BLD MANUAL: 39 % (ref 19.6–45.3)
LYMPHOCYTES NFR BLD MANUAL: 7 % (ref 5–12)
MCH RBC QN AUTO: 26.1 PG (ref 26.6–33)
MCHC RBC AUTO-ENTMCNC: 31 G/DL (ref 31.5–35.7)
MCV RBC AUTO: 84.2 FL (ref 79–97)
MODALITY: ABNORMAL
MONOCYTES # BLD AUTO: 1.01 10*3/MM3 (ref 0.1–0.9)
NEUTROPHILS # BLD AUTO: 6.77 10*3/MM3 (ref 1.7–7)
NEUTROPHILS NFR BLD MANUAL: 45 % (ref 42.7–76)
NEUTS BAND NFR BLD MANUAL: 2 % (ref 0–5)
PCO2 BLDA: 48.2 MM HG (ref 35–48)
PEEP RESPIRATORY: 40 CM[H2O]
PH BLDA: 7.35 PH UNITS (ref 7.35–7.45)
PLATELET # BLD AUTO: 159 10*3/MM3 (ref 140–450)
PMV BLD AUTO: 9.8 FL (ref 6–12)
PO2 BLDA: 72 MM HG (ref 83–108)
POTASSIUM SERPL-SCNC: 4.6 MMOL/L (ref 3.5–5.2)
QT INTERVAL: 421 MS
RBC # BLD AUTO: 3.71 10*6/MM3 (ref 3.77–5.28)
SAO2 % BLDCOA: 93.3 % (ref 94–98)
SCAN SLIDE: NORMAL
SMUDGE CELLS BLD QL SMEAR: ABNORMAL
SODIUM SERPL-SCNC: 139 MMOL/L (ref 136–145)
WBC # BLD AUTO: 14.4 10*3/MM3 (ref 3.4–10.8)

## 2021-01-02 PROCEDURE — 85007 BL SMEAR W/DIFF WBC COUNT: CPT | Performed by: INTERNAL MEDICINE

## 2021-01-02 PROCEDURE — 94799 UNLISTED PULMONARY SVC/PX: CPT

## 2021-01-02 PROCEDURE — 25010000002 MORPHINE PER 10 MG: Performed by: INTERNAL MEDICINE

## 2021-01-02 PROCEDURE — 25010000002 PIPERACILLIN SOD-TAZOBACTAM PER 1 G: Performed by: INTERNAL MEDICINE

## 2021-01-02 PROCEDURE — 82803 BLOOD GASES ANY COMBINATION: CPT

## 2021-01-02 PROCEDURE — 36600 WITHDRAWAL OF ARTERIAL BLOOD: CPT

## 2021-01-02 PROCEDURE — 25010000002 METOCLOPRAMIDE PER 10 MG: Performed by: SURGERY

## 2021-01-02 PROCEDURE — 25010000002 METHYLNALTREXONE 12 MG/0.6ML SOLUTION: Performed by: SURGERY

## 2021-01-02 PROCEDURE — 71045 X-RAY EXAM CHEST 1 VIEW: CPT

## 2021-01-02 PROCEDURE — 99024 POSTOP FOLLOW-UP VISIT: CPT | Performed by: SURGERY

## 2021-01-02 PROCEDURE — 99233 SBSQ HOSP IP/OBS HIGH 50: CPT | Performed by: INTERNAL MEDICINE

## 2021-01-02 PROCEDURE — 85025 COMPLETE CBC W/AUTO DIFF WBC: CPT | Performed by: INTERNAL MEDICINE

## 2021-01-02 PROCEDURE — 25010000002 KETOROLAC TROMETHAMINE PER 15 MG: Performed by: SURGERY

## 2021-01-02 PROCEDURE — 94660 CPAP INITIATION&MGMT: CPT

## 2021-01-02 PROCEDURE — 80048 BASIC METABOLIC PNL TOTAL CA: CPT | Performed by: INTERNAL MEDICINE

## 2021-01-02 RX ORDER — OXYCODONE HYDROCHLORIDE 5 MG/1
5 TABLET ORAL EVERY 8 HOURS PRN
Status: DISCONTINUED | OUTPATIENT
Start: 2021-01-02 | End: 2021-01-13 | Stop reason: HOSPADM

## 2021-01-02 RX ORDER — METOCLOPRAMIDE HYDROCHLORIDE 5 MG/ML
10 INJECTION INTRAMUSCULAR; INTRAVENOUS EVERY 6 HOURS
Status: DISCONTINUED | OUTPATIENT
Start: 2021-01-02 | End: 2021-01-12

## 2021-01-02 RX ORDER — KETOROLAC TROMETHAMINE 30 MG/ML
15 INJECTION, SOLUTION INTRAMUSCULAR; INTRAVENOUS EVERY 6 HOURS PRN
Status: DISPENSED | OUTPATIENT
Start: 2021-01-02 | End: 2021-01-07

## 2021-01-02 RX ORDER — MORPHINE SULFATE 4 MG/ML
2 INJECTION, SOLUTION INTRAMUSCULAR; INTRAVENOUS EVERY 4 HOURS PRN
Status: DISCONTINUED | OUTPATIENT
Start: 2021-01-02 | End: 2021-01-02

## 2021-01-02 RX ORDER — MORPHINE SULFATE 4 MG/ML
1 INJECTION, SOLUTION INTRAMUSCULAR; INTRAVENOUS EVERY 4 HOURS PRN
Status: DISCONTINUED | OUTPATIENT
Start: 2021-01-02 | End: 2021-01-02

## 2021-01-02 RX ORDER — SODIUM CHLORIDE/ALOE VERA
GEL (GRAM) NASAL AS NEEDED
Status: DISCONTINUED | OUTPATIENT
Start: 2021-01-02 | End: 2021-01-13 | Stop reason: HOSPADM

## 2021-01-02 RX ORDER — MORPHINE SULFATE 4 MG/ML
2 INJECTION, SOLUTION INTRAMUSCULAR; INTRAVENOUS EVERY 4 HOURS PRN
Status: DISCONTINUED | OUTPATIENT
Start: 2021-01-02 | End: 2021-01-13 | Stop reason: HOSPADM

## 2021-01-02 RX ADMIN — PIPERACILLIN AND TAZOBACTAM 3.38 G: 3; .375 INJECTION, POWDER, LYOPHILIZED, FOR SOLUTION INTRAVENOUS; PARENTERAL at 12:02

## 2021-01-02 RX ADMIN — DOCUSATE SODIUM 100 MG: 100 CAPSULE, LIQUID FILLED ORAL at 10:20

## 2021-01-02 RX ADMIN — RISPERIDONE 0.25 MG: 0.5 TABLET, ORALLY DISINTEGRATING ORAL at 21:34

## 2021-01-02 RX ADMIN — PIPERACILLIN AND TAZOBACTAM 3.38 G: 3; .375 INJECTION, POWDER, LYOPHILIZED, FOR SOLUTION INTRAVENOUS; PARENTERAL at 21:35

## 2021-01-02 RX ADMIN — IPRATROPIUM BROMIDE AND ALBUTEROL SULFATE 3 ML: 2.5; .5 SOLUTION RESPIRATORY (INHALATION) at 11:28

## 2021-01-02 RX ADMIN — PANTOPRAZOLE SODIUM 40 MG: 40 TABLET, DELAYED RELEASE ORAL at 06:31

## 2021-01-02 RX ADMIN — OXYCODONE 10 MG: 5 TABLET ORAL at 07:31

## 2021-01-02 RX ADMIN — SUCRALFATE 1 G: 1 TABLET ORAL at 07:31

## 2021-01-02 RX ADMIN — METOCLOPRAMIDE HYDROCHLORIDE 10 MG: 5 INJECTION INTRAMUSCULAR; INTRAVENOUS at 12:02

## 2021-01-02 RX ADMIN — GUAIFENESIN 600 MG: 600 TABLET, EXTENDED RELEASE ORAL at 21:34

## 2021-01-02 RX ADMIN — GUAIFENESIN 600 MG: 600 TABLET, EXTENDED RELEASE ORAL at 10:20

## 2021-01-02 RX ADMIN — BUDESONIDE AND FORMOTEROL FUMARATE DIHYDRATE 2 PUFF: 160; 4.5 AEROSOL RESPIRATORY (INHALATION) at 06:48

## 2021-01-02 RX ADMIN — METOCLOPRAMIDE HYDROCHLORIDE 10 MG: 5 INJECTION INTRAMUSCULAR; INTRAVENOUS at 23:52

## 2021-01-02 RX ADMIN — KETOROLAC TROMETHAMINE 15 MG: 30 INJECTION, SOLUTION INTRAMUSCULAR at 13:17

## 2021-01-02 RX ADMIN — IPRATROPIUM BROMIDE AND ALBUTEROL SULFATE 3 ML: 2.5; .5 SOLUTION RESPIRATORY (INHALATION) at 22:43

## 2021-01-02 RX ADMIN — IPRATROPIUM BROMIDE AND ALBUTEROL SULFATE 3 ML: 2.5; .5 SOLUTION RESPIRATORY (INHALATION) at 06:48

## 2021-01-02 RX ADMIN — PIPERACILLIN AND TAZOBACTAM 3.38 G: 3; .375 INJECTION, POWDER, LYOPHILIZED, FOR SOLUTION INTRAVENOUS; PARENTERAL at 03:22

## 2021-01-02 RX ADMIN — ROSUVASTATIN CALCIUM 10 MG: 10 TABLET, FILM COATED ORAL at 21:34

## 2021-01-02 RX ADMIN — DOCUSATE SODIUM 100 MG: 100 CAPSULE, LIQUID FILLED ORAL at 21:34

## 2021-01-02 RX ADMIN — MORPHINE SULFATE 2 MG: 4 INJECTION INTRAVENOUS at 13:28

## 2021-01-02 RX ADMIN — METOCLOPRAMIDE HYDROCHLORIDE 10 MG: 5 INJECTION INTRAMUSCULAR; INTRAVENOUS at 18:40

## 2021-01-02 RX ADMIN — FERROUS SULFATE TAB EC 324 MG (65 MG FE EQUIVALENT) 324 MG: 324 (65 FE) TABLET DELAYED RESPONSE at 10:21

## 2021-01-02 RX ADMIN — IPRATROPIUM BROMIDE AND ALBUTEROL SULFATE 3 ML: 2.5; .5 SOLUTION RESPIRATORY (INHALATION) at 17:16

## 2021-01-02 RX ADMIN — LEVOTHYROXINE SODIUM 100 MCG: 125 TABLET ORAL at 07:31

## 2021-01-02 RX ADMIN — AMLODIPINE BESYLATE 10 MG: 5 TABLET ORAL at 13:39

## 2021-01-02 RX ADMIN — OXYCODONE 10 MG: 5 TABLET ORAL at 03:22

## 2021-01-02 RX ADMIN — METHYLNALTREXONE BROMIDE 6 MG: 12 INJECTION, SOLUTION SUBCUTANEOUS at 12:02

## 2021-01-02 RX ADMIN — IPRATROPIUM BROMIDE AND ALBUTEROL SULFATE 3 ML: 2.5; .5 SOLUTION RESPIRATORY (INHALATION) at 02:34

## 2021-01-02 NOTE — PROGRESS NOTES
"      [facility] Medicine Services Daily Progress Note      Hospitalist Team  LOS 2 days      Patient Care Team:  Jemima Fontaine MD as PCP - General (Family Medicine)    Patient Location: 2105/1      Subjective   Subjective     Chief Complaint / Subjective  No chief complaint on file.  follow up shortness of breath       Brief Synopsis of Hospital Course/HPI     80-year-old female who was having issues with GERD was found to have hiatal hernia and is underwent fixation per general surgery.  She has been admitted postoperatively medicine has been consulted to help with her medical problems.  Her blood pressure stable she is having some apnea spells from the sedation but overall her breathing is stable.  No other issues.  As reported she is slightly sedated from the procedure and HPI was slightly limited.         Date::    12/31/20: had worsening respiratory status last night and has been on HFNC this am. Refusing to wear BIPAP although likely needs it fo LIBBY.    1/1/20: on BIPAP this am and thus HPI limited. Events of overnight noted was moved to higher level of care. O2 is stable today. Seems O2 worse when not deep breathing and when sleeping as has LIBBY.     1/2/21: no real improvement. Having issues with pain medication and sedation not allowing pulmonary toilet. Will not wear BIPAP    Review of Systems   Respiratory: Positive for shortness of breath. Negative for cough.    Gastrointestinal: Positive for abdominal pain.         Objective   Objective      Vital Signs  Temp:  [97.7 °F (36.5 °C)-98.3 °F (36.8 °C)] 98.2 °F (36.8 °C)  Heart Rate:  [80-94] 82  Resp:  [18-24] 18  BP: (143-148)/(51-62) 145/62  Oxygen Therapy  SpO2: 95 %  Pulse Oximetry Type: Continuous  Device (Oxygen Therapy): other (see comments)  $ High Flow Nasal Cannula Set-Up: yes  Flow (L/min): 40  Oxygen Concentration (%): 100  Flowsheet Rows      First Filed Value   Admission Height  157.5 cm (62\") Documented at " 12/16/2020 1444   Admission Weight  81.6 kg (180 lb) Documented at 12/16/2020 1444        Intake & Output (last 3 days)       12/30 0701 - 12/31 0700 12/31 0701 - 01/01 0700 01/01 0701 - 01/02 0700 01/02 0701 - 01/03 0700    P.O. 200 840      I.V. (mL/kg) 1200 (14.4)       IV Piggyback    100    Total Intake(mL/kg) 1400 (16.7) 840 (10)  100 (1.2)    Urine (mL/kg/hr) 850 750 (0.4) 1650 (0.8)     Total Output      Net +550 +90 -1650 +100            Urine Unmeasured Occurrence  1 x          Lines, Drains & Airways    Active LDAs     Name:   Placement date:   Placement time:   Site:   Days:    Peripheral IV 12/31/20 0938 Anterior;Left Forearm   12/31/20    0938    Forearm   less than 1                  Physical Exam:    Physical Exam  Vitals signs and nursing note reviewed.   Constitutional:       General: She is not in acute distress.  Eyes:      Pupils: Pupils are equal, round, and reactive to light.   Cardiovascular:      Rate and Rhythm: Normal rate.   Pulmonary:      Effort: No respiratory distress.      Breath sounds: No wheezing or rales.   Abdominal:      General: There is distension.      Palpations: Abdomen is soft.      Comments: TTP over the surgical site    Skin:     General: Skin is warm and dry.   Neurological:      Mental Status: She is alert.   Psychiatric:      Comments: anxious              Wounds (last 24 hours)      LDA Wound     Row Name 01/01/21 1945 01/01/21 1600          Wound 12/30/20 1037 abdomen Incision    Wound - Properties Group Placement Date: 12/30/20  -AM Placement Time: 1037  -AM Location: abdomen  -AM Primary Wound Type: Incision  -AM    Dressing Appearance  dry;intact;no drainage  -KD  --     Closure  BRIANNE  -KD  BRIANNE  -SH     Base  dressing in place, unable to visualize  -KD  dressing in place, unable to visualize  -SH     Periwound  intact;dry  -KD  --     Periwound Care  dry periwound area maintained  -KD  --     Retired Wound - Properties Group Date first assessed:  12/30/20  -AM Time first assessed: 1037  -AM Location: abdomen  -AM Primary Wound Type: Incision  -AM      User Key  (r) = Recorded By, (t) = Taken By, (c) = Cosigned By    Initials Name Provider Type    AM Ping Martinez, RN Registered Nurse    Kusum Silvestre RN Registered Nurse    Lisa Alfonso, RN Registered Nurse          Procedures:    Procedure(s):  Laparoscopic hiatal hernia repair with gastropexy          Results Review:     I reviewed the patient's new clinical results.      Lab Results (last 24 hours)     Procedure Component Value Units Date/Time    CBC & Differential [[id number]]  (Abnormal) Collected: 01/02/21 0231    Specimen: Blood Updated: 01/02/21 0419    Narrative:      The following orders were created for panel order CBC & Differential.  Procedure                               Abnormality         Status                     ---------                               -----------         ------                     Scan Slide[[id number]]                                       Final result               CBC Auto Differential[[id number]]        Abnormal            Final result                 Please view results for these tests on the individual orders.    CBC Auto Differential [[id number]]  (Abnormal) Collected: 01/02/21 0231    Specimen: Blood Updated: 01/02/21 0419     WBC 14.40 10*3/mm3      RBC 3.71 10*6/mm3      Hemoglobin 9.7 g/dL      Hematocrit 31.3 %      MCV 84.2 fL      MCH 26.1 pg      MCHC 31.0 g/dL      RDW 15.8 %      RDW-SD 46.8 fl      MPV 9.8 fL      Platelets 159 10*3/mm3     Narrative:      The previously reported component NRBC is no longer being reported. Previous result was 0.1 /100 WBC (Reference Range: 0.0-0.2 /100 WBC) on 1/2/2021 at 0308 EST.    Scan Slide [[id number]] Collected: 01/02/21 0231    Specimen: Blood Updated: 01/02/21 0419     Scan Slide --     Comment: See Manual Differential Results       Manual Differential [[id number]]  (Abnormal) Collected: 01/02/21  0231    Specimen: Blood Updated: 01/02/21 0419     Neutrophil % 45.0 %      Lymphocyte % 39.0 %      Monocyte % 7.0 %      Eosinophil % 5.0 %      Basophil % 2.0 %      Bands %  2.0 %      Neutrophils Absolute 6.77 10*3/mm3      Lymphocytes Absolute 5.62 10*3/mm3      Monocytes Absolute 1.01 10*3/mm3      Eosinophils Absolute 0.72 10*3/mm3      Basophils Absolute 0.29 10*3/mm3      Anisocytosis Slight/1+     Smudge Cells Slight/1+     Large Platelets Slight/1+    Basic Metabolic Panel [[id number]]  (Abnormal) Collected: 01/02/21 0231    Specimen: Blood Updated: 01/02/21 0315     Glucose 112 mg/dL      BUN 19 mg/dL      Creatinine 1.03 mg/dL      Sodium 139 mmol/L      Potassium 4.6 mmol/L      Comment: Slight hemolysis detected by analyzer. Results may be affected.        Chloride 102 mmol/L      CO2 27.0 mmol/L      Calcium 9.1 mg/dL      eGFR Non African Amer 52 mL/min/1.73      BUN/Creatinine Ratio 18.4     Anion Gap 10.0 mmol/L     Narrative:      GFR Normal >60  Chronic Kidney Disease <60  Kidney Failure <15      Blood Gas, Arterial - [[id number]]  (Abnormal) Collected: 01/01/21 1521    Specimen: Arterial Blood Updated: 01/01/21 1523     Site Left Radial     Sai's Test Positive     pH, Arterial 7.355 pH units      pCO2, Arterial 51.9 mm Hg      pO2, Arterial 63.4 mm Hg      HCO3, Arterial 29.0 mmol/L      Base Excess, Arterial 2.7 mmol/L      Comment: Serial Number: [id number]:  [id number]        O2 Saturation, Arterial 90.4 %      CO2 Content 30.6 mmol/L      Barometric Pressure for Blood Gas --     Comment: N/A        Modality Vapotherm     FIO2 90 %      Hemodilution No        No results found for: HGBA1C        Results from last 7 days   Lab Units 01/01/21  1521   PH, ARTERIAL pH units 7.355   PO2 ART mm Hg 63.4*   PCO2, ARTERIAL mm Hg 51.9*   HCO3 ART mmol/L 29.0*     No results found for: LIPASE  Lab Results   Component Value Date    CHOL 374 (H) 01/03/2020    TRIG 311 (H) 01/03/2020    HDL 50 01/03/2020      (H) 01/03/2020       Lab Results   Lab Value Date/Time    FINALDX  12/31/2020 0205     Leukocytosis with absolute atypical lymphocytosis  Anemia  No blasts identified  If CBC indices persist/worsen, consider flow cytometry or other studies as clinically indicated to exclude a lymphoproliferative disorder      FINALDX  02/06/2020 1405     Absolute atypical lymphocytosis.  Anemia.  No blasts identified.  Recommend clinical follow-up and additional studies to include flow cytometry as clinically indicated to exclude CLL/SLL or other lymphoproliferative disorder.         Microbiology Results (last 10 days)     Procedure Component Value - Date/Time    COVID-19,APTIMA PANTHER,[facility]E IN-HOUSE, NP/OP SWAB IN UTM/VTM/SALINE TRANSPORT MEDIA,24 HR TAT - Swab, Nasopharynx [[id number]]  (Normal) Collected: 12/28/20 1227    Lab Status: Final result Specimen: Swab from Nasopharynx Updated: 12/28/20 2046     COVID19 Not Detected    Narrative:      Fact sheet for providers: https://www.fda.gov/media/350305/download     Fact sheet for patients: https://www.fda.gov/media/182271/download    Test performed by PCR.          ECG/EMG Results (most recent)     Procedure Component Value Units Date/Time    ECG 12 Lead [[id number]] Collected: 12/30/20 1314     Updated: 12/30/20 1317     QT Interval 421 ms     Narrative:      HEART RATE= 71  bpm  RR Interval= 844  ms  VT Interval= 172  ms  P Horizontal Axis= -9  deg  P Front Axis= 48  deg  QRSD Interval= 101  ms  QT Interval= 421  ms  QRS Axis= 41  deg  T Wave Axis= 77  deg  - OTHERWISE NORMAL ECG -  Sinus rhythm  Low voltage, precordial leads  When compared with ECG of 18-Dec-2020 14:29:46,  Significant axis, voltage or hypertrophy change  Electronically Signed By:   Date and Time of Study: 2020-12-30 13:14:52                    Xr Chest 1 View    Result Date: 1/2/2021    1.  Low volume inspiration with persistent left lower lobe airspace consolidation which is unchanged. 2.  Small  bilateral pleural effusions.   Electronically Signed By-Jan Vernon MD On:1/2/2021 11:39 AM This report was finalized on [id number] by  Jan Vernon MD.    Xr Chest 1 View    Result Date: 12/31/2020   1. Consolidation in the left lung base which appears new. 2. Lower lung volumes with right basilar atelectasis. 3. Cardiomegaly without acute cardiac decompensation. 4. Right basilar atelectasis.  Electronically Signed By-Den Connell MD On:12/31/2020 9:10 AM This report was finalized on [id number] by  Den Connell MD.          Xrays, labs reviewed personally by physician.    Medication Review:   I have reviewed the patient's current medication list      Scheduled Meds  amLODIPine, 10 mg, Oral, Daily With Lunch  budesonide-formoterol, 2 puff, Inhalation, BID - RT  docusate sodium, 100 mg, Oral, BID  guaiFENesin, 600 mg, Oral, Q12H  ipratropium-albuterol, 3 mL, Nebulization, Q4H - RT  levothyroxine, 100 mcg, Oral, QAM AC  methylnaltrexone, 6 mg, Subcutaneous, Every Other Day  metoclopramide, 10 mg, Intravenous, Q6H  pantoprazole, 40 mg, Oral, Q AM  piperacillin-tazobactam, 3.375 g, Intravenous, Q8H  rosuvastatin, 10 mg, Oral, Nightly        Meds Infusions  lactated ringers, 9 mL/hr, Last Rate: Stopped (12/30/20 2009)  phenylephrine, 0.5-3 mcg/kg/min        Meds PRN  •  acetaminophen **OR** acetaminophen  •  influenza vaccine  •  ketorolac  •  lactated ringers  •  Morphine  •  [DISCONTINUED] HYDROmorphone **AND** naloxone  •  [DISCONTINUED] Morphine **AND** naloxone  •  ondansetron **OR** ondansetron  •  oxyCODONE  •  phenylephrine  •  promethazine **OR** promethazine  •  risperiDONE        Assessment/Plan   Assessment/Plan     Active Hospital Problems    Diagnosis  POA   • **Hiatal hernia with gastroesophageal reflux [K21.9, K44.9]  Unknown   • Acute respiratory failure with hypoxia and hypercapnia (CMS/HCC) [J96.01, J96.02]  Unknown   • Abdominal pain [R10.9]  Unknown   • Nocturnal hypoxia [G47.34]  Yes   •  LIBBY (obstructive sleep apnea) [G47.33]  Unknown      Resolved Hospital Problems   No resolved problems to display.       MEDICAL DECISION MAKING COMPLEXITY BY PROBLEM:     Acute on chronic hypoxic respiratory failure, LIBBY  -likely realted to narcotics and LIBBY vs LLL CAP  -CXR 12/31/20: new LLL infiltrate  -CT chest reviewed b/l LL atelectasis vs infiltrate  -Oxygen on precision flow, wean   -has been refusing AVAP, encourage use   -Nebulizers q4, add symbicort  -c/w Zosyn  -encourage aggressive pulmonary toilet  -pulmonary following    Hiatal hernia with GERD   -S/p laparoscopic hiatal hernia repair with gastropexy  -General surgery primary postop per them  -PPI, carafate, reglan prn  -surgery decreasing pain regimen and agree with this for less sedation     Hypertension  -Home meds  -Monitor and adjust blood pressure prn    CLL  -WBC chronically elevated, improved to 14 today (baseline 15-20 range)  -Follow CBC while here    High risk for further respiratory decline    VTE Prophylaxis -   Mechanical Order History:      Ordered        12/30/20 1513  Place Sequential Compression Device  Once         12/30/20 1513  Place Sequential Compression Device  Once         12/30/20 1513  Maintain Sequential Compression Device  Continuous                 Pharmalogical Order History:     None            Code Status -   Code Status and Medical Interventions:   Ordered at: 12/30/20 1513     Code Status:    CPR     Medical Interventions (Level of Support Prior to Arrest):    Full       This patient has been examined wearing appropriate Personal Protective Equipment. 01/02/21        Discharge Planning  Respiratory status will have to improve before d/c         Electronically signed by Bob Lewis DO, 01/02/21, 12:42 EST.  [facility] Hospitalist Team

## 2021-01-02 NOTE — NURSING NOTE
"RN attempted to place NGT. Nasal passages very dry and started to bleed   Pt continually fighting and crying; daughter at bedside tried to hold hands and reassure; pt crying and stating she 'just can't do this any more and it is so awful\".. states her \"nerves are a wreck and she wants to forget it all.\"  "

## 2021-01-02 NOTE — NURSING NOTE
Pt was sleeping soundly; o2 sat 85-86% on 40l 100% PF. Attempt IS 10x- only reached 500 ml; made her cough and deep breath. o2 sat only up to 88%.  Placed on avaps 70% with no change in her sats, increased avaps to 90%; pt screaming and moaning that it hurts her chest.  Called RTto bring a nrb.    Pt now sitting up in bed at 40 degrees and o2 sat 95%.  She is drowsy. Will continue to monitor.

## 2021-01-02 NOTE — NURSING NOTE
Notified dr. Goins of inability to place ngt tube due to pt's panic attack and fighting it and dry nasal passage. Instructed to just hold off on ngt at this time.

## 2021-01-02 NOTE — PROGRESS NOTES
GENERAL SURGERY PROGRESS NOTE    1/2/2021   LOS: 2 days   Patient Care Team:  Jemima Fontaine MD as PCP - General (Family Medicine)    HIATAL HERNIA REPAIR LAPAROSCOPIC  12/30/2020  Chief Complaint: giant hiatal hernia with pulmonary compromise  Subjective   HPI: Patient is a 80 year old lady s/p laparoscopic hiatal hernia repair with gastropexy for giant hiatal hernia with pulmonary compromise on 12/30/2020.     Interval History:   2 days ago Patient complained of abdominal pain and distention overnight with difficulty breathing.  A fast team was called, and laboratory values were obtained which demonstrated hypoxemia and hypercapnia.  The patient was transferred to PCU for close observation and increased pulmonary support.    CT scans of the chest, abdomen and pelvis were obtained.  No official read is back on the CT scan of the abdomen and pelvis, however I note a distended stomach with relatively decompressed small bowel and colonic stool throughout the entire abdomen.  No evidence of obstruction.    Also noted the Bellamy catheter was within the vagina and not within the bladder which was mildly distended with urine.    Objective     Vital Signs  Temp:  [97.7 °F (36.5 °C)-98.4 °F (36.9 °C)] 98.2 °F (36.8 °C)  Heart Rate:  [80-94] 83  Resp:  [19-24] 19  BP: (126-148)/(48-62) 145/62    Physical Exam  Vitals signs reviewed.   Constitutional:       General: She is in acute distress.      Appearance: She is not toxic-appearing.   Cardiovascular:      Rate and Rhythm: Normal rate and regular rhythm.   Pulmonary:      Effort: Tachypnea present.      Breath sounds: Normal breath sounds.      Comments: On high flow nasal cannula  Abdominal:      Comments: The abdomen is soft, appropriately tender, incisions are well approximated with dressings overlying the skin incisions.  There is no surrounding erythema, induration, or drainage to suggest infection.  The abdomen is moderately distended.   Neurological:       Mental Status: She is alert.   Psychiatric:         Mood and Affect: Mood normal.         Behavior: Behavior normal.          Results Review:    Lab Results (last 24 hours)     Procedure Component Value Units Date/Time    CBC & Differential [[id number]]  (Abnormal) Collected: 01/02/21 0231    Specimen: Blood Updated: 01/02/21 0419    Narrative:      The following orders were created for panel order CBC & Differential.  Procedure                               Abnormality         Status                     ---------                               -----------         ------                     Scan Slide[[id number]]                                       Final result               CBC Auto Differential[[id number]]        Abnormal            Final result                 Please view results for these tests on the individual orders.    CBC Auto Differential [[id number]]  (Abnormal) Collected: 01/02/21 0231    Specimen: Blood Updated: 01/02/21 0419     WBC 14.40 10*3/mm3      RBC 3.71 10*6/mm3      Hemoglobin 9.7 g/dL      Hematocrit 31.3 %      MCV 84.2 fL      MCH 26.1 pg      MCHC 31.0 g/dL      RDW 15.8 %      RDW-SD 46.8 fl      MPV 9.8 fL      Platelets 159 10*3/mm3     Narrative:      The previously reported component NRBC is no longer being reported. Previous result was 0.1 /100 WBC (Reference Range: 0.0-0.2 /100 WBC) on 1/2/2021 at 0308 EST.    Scan Slide [[id number]] Collected: 01/02/21 0231    Specimen: Blood Updated: 01/02/21 0419     Scan Slide --     Comment: See Manual Differential Results       Manual Differential [[id number]]  (Abnormal) Collected: 01/02/21 0231    Specimen: Blood Updated: 01/02/21 0419     Neutrophil % 45.0 %      Lymphocyte % 39.0 %      Monocyte % 7.0 %      Eosinophil % 5.0 %      Basophil % 2.0 %      Bands %  2.0 %      Neutrophils Absolute 6.77 10*3/mm3      Lymphocytes Absolute 5.62 10*3/mm3      Monocytes Absolute 1.01 10*3/mm3      Eosinophils Absolute 0.72 10*3/mm3      Basophils  Absolute 0.29 10*3/mm3      Anisocytosis Slight/1+     Smudge Cells Slight/1+     Large Platelets Slight/1+    Basic Metabolic Panel [[id number]]  (Abnormal) Collected: 01/02/21 0231    Specimen: Blood Updated: 01/02/21 0315     Glucose 112 mg/dL      BUN 19 mg/dL      Creatinine 1.03 mg/dL      Sodium 139 mmol/L      Potassium 4.6 mmol/L      Comment: Slight hemolysis detected by analyzer. Results may be affected.        Chloride 102 mmol/L      CO2 27.0 mmol/L      Calcium 9.1 mg/dL      eGFR Non African Amer 52 mL/min/1.73      BUN/Creatinine Ratio 18.4     Anion Gap 10.0 mmol/L     Narrative:      GFR Normal >60  Chronic Kidney Disease <60  Kidney Failure <15      Blood Gas, Arterial - [[id number]]  (Abnormal) Collected: 01/01/21 1521    Specimen: Arterial Blood Updated: 01/01/21 1523     Site Left Radial     Sai's Test Positive     pH, Arterial 7.355 pH units      pCO2, Arterial 51.9 mm Hg      pO2, Arterial 63.4 mm Hg      HCO3, Arterial 29.0 mmol/L      Base Excess, Arterial 2.7 mmol/L      Comment: Serial Number: [id number]:  [id number]        O2 Saturation, Arterial 90.4 %      CO2 Content 30.6 mmol/L      Barometric Pressure for Blood Gas --     Comment: N/A        Modality Vapotherm     FIO2 90 %      Hemodilution No        Imaging Results (Last 24 Hours)     ** No results found for the last 24 hours. **           I have reviewed the above results and noted them below    Medication Review:    Current Facility-Administered Medications:   •  acetaminophen (TYLENOL) tablet 650 mg, 650 mg, Oral, Q4H PRN **OR** acetaminophen (TYLENOL) suppository 650 mg, 650 mg, Rectal, Q4H PRN, Den Plummer DO  •  amLODIPine (NORVASC) tablet 10 mg, 10 mg, Oral, Daily With Lunch, Bob Lewis DO, 10 mg at 01/01/21 1323  •  budesonide-formoterol (SYMBICORT) 160-4.5 MCG/ACT inhaler 2 puff, 2 puff, Inhalation, BID - RT, Bob Lewis DO, 2 puff at 01/02/21 0648  •  docusate sodium (COLACE) capsule 100 mg, 100 mg, Oral, BID,  Bob Lewis DO, 100 mg at 01/02/21 1020  •  guaiFENesin (MUCINEX) 12 hr tablet 600 mg, 600 mg, Oral, Q12H, Bob Lewis DO, 600 mg at 01/02/21 1020  •  influenza vac split quad (FLUZONE,FLUARIX,AFLURIA,FLULAVAL) injection 0.5 mL, 0.5 mL, Intramuscular, During Hospitalization, Bob Lewis DO  •  ipratropium-albuterol (DUO-NEB) nebulizer solution 3 mL, 3 mL, Nebulization, Q4H - RT, Bob Lewis DO, 3 mL at 01/02/21 0648  •  ketorolac (TORADOL) injection 15 mg, 15 mg, Intravenous, Q6H PRN, Shanna Goins MD  •  lactated ringers infusion, 9 mL/hr, Intravenous, Continuous PRN, Den Plummer, , Stopped at 12/30/20 2009  •  levothyroxine (SYNTHROID, LEVOTHROID) tablet 100 mcg, 100 mcg, Oral, QAM AC, Bob Lewis DO, 100 mcg at 01/02/21 0731  •  methylnaltrexone (RELISTOR) injection 6 mg, 6 mg, Subcutaneous, Every Other Day, Shanna Goins MD  •  metoclopramide (REGLAN) injection 10 mg, 10 mg, Intravenous, Q6H, Shanna Goins MD  •  Morphine sulfate (PF) injection 2 mg, 2 mg, Intravenous, Q4H PRN, Marnie Frankel MD  •  [DISCONTINUED] HYDROmorphone (DILAUDID) injection 0.5 mg, 0.5 mg, Intravenous, Q2H PRN, 0.5 mg at 12/31/20 6191 **AND** naloxone (NARCAN) injection 0.1 mg, 0.1 mg, Intravenous, Q5 Min PRN, Den Plummer, DO  •  [DISCONTINUED] Morphine sulfate (PF) injection 4 mg, 4 mg, Intravenous, Q2H PRN **AND** naloxone (NARCAN) injection 0.4 mg, 0.4 mg, Intravenous, Q5 Min PRN, Den Plummer, DO  •  ondansetron (ZOFRAN) tablet 4 mg, 4 mg, Oral, Q6H PRN **OR** ondansetron (ZOFRAN) injection 4 mg, 4 mg, Intravenous, Q6H PRN, Den Plummer DO  •  oxyCODONE (ROXICODONE) immediate release tablet 5 mg, 5 mg, Oral, Q8H PRN, Shanna Goins MD  •  pantoprazole (PROTONIX) EC tablet 40 mg, 40 mg, Oral, Q AM, Den Plummer DO, 40 mg at 01/02/21 0631  •  phenylephrine (AUDRA-SYNEPHRINE) 50 mg in 250 mL NS infusion, 0.5-3 mcg/kg/min, Intravenous, Continuous PRN, Den Plummer DO  •  piperacillin-tazobactam (ZOSYN) IVPB  3.375 g in 100 mL NS (CD), 3.375 g, Intravenous, Q8H, Bob Lewis, DO, 3.375 g at 01/02/21 0322  •  promethazine (PHENERGAN) tablet 12.5 mg, 12.5 mg, Oral, Q6H PRN **OR** promethazine (PHENERGAN) suppository 12.5 mg, 12.5 mg, Rectal, Q6H PRN, Den Plummer, DO  •  risperiDONE (risperDAL M-TABS) disintegrating tablet 0.25 mg, 0.25 mg, Oral, Nightly PRN, Bob Lewis, DO, 0.25 mg at 01/01/21 2006  •  rosuvastatin (CRESTOR) tablet 10 mg, 10 mg, Oral, Nightly, Bob Lewis, DO, 10 mg at 01/01/21 2005    Assessment/Plan     Principal Problem:    Hiatal hernia with gastroesophageal reflux  Active Problems:    LIBBY (obstructive sleep apnea)    Nocturnal hypoxia    Acute respiratory failure with hypoxia and hypercapnia (CMS/HCC)    Abdominal pain    Patient postop day 3 status post laparoscopic hiatal hernia repair with gastropexy  Having some respiratory issues postoperatively.    Patient taking oxycodone 10 mg every 4 hours around-the-clock.  I told her that this could be making her pain worse because it could be causing a postoperative ileus.  Her incision should not be that painful and her pain is likely due to distention of her stomach and bowel.    I believe we should try a nasogastric tube to see if it can be helpful in decompressing her stomach.  I am also ordering a higher dose of Reglan and decreasing the dosage of oxycodone to 5 mg every 6 hours as needed and I am also going to add Toradol for pain.  I am also going to try Relistor.    Shanna Goins MD  01/02/21  11:09 EST

## 2021-01-02 NOTE — PROGRESS NOTES
Daily Progress Note    Hiatal hernia with gastroesophageal reflux    LIBBY (obstructive sleep apnea)    Nocturnal hypoxia    Acute respiratory failure with hypoxia and hypercapnia (CMS/HCC)    Abdominal pain    Assessment    Hiatal hernia with gastroesophageal reflux  s/p abd sx repair of large hiatal hernia 12/30/20  Surgery following    Acute respiratory failure with hypoxia and hypercapnia   CXR reviewed, right basilar atelectasis, consolidation in left lung base  Patient has required higher amounts of oxygen and is refusing to wear AVAP    Office note  Severe obstructive sleep apnea  home sleep study showed apnea-hypopnea index 34.5  Chronic hypoxic respiratory insufficiency  on oxygen 3 L.     Completed treatment for MAC,  significant improvement in oxygen and weight   Overnight oximetry showed significant nocturnal hypoxia   BAL February 2019 positive for MAC patient has bilateral lower lobe bronchiectasis chronic cough weight loss and worsening hypoxia ,  start treatment in March 2019  Large hiatal hernia with reflux, s/p repeat bronchoscopy  on 09/13/2019 negative MAC cultures ok to proceed with surgery for hernia surgery   History of aspiration secondary to vomiting      Breast cancer s/p 2-4 s/p Right simple mastectomy and [place] lymph node biopsy  CLL  Hypothyroidism  Hypertension  Diabetes  GERD  Hyperlipidemia  Osteoarthritis    Plan    CXR this am, patient unable to lay flat for CT chest or abdomin  Continue symbicort and duo nebs  Changed frequency of morphine  Continue zosyn  Gi prophylaxis protonix  DVT prophylaxis per surgery  Diet per surgery  PT  IS/flutter    Discussed plan with patient and daughter that she needs to use the bipap; and decrease amount of pain medication because she is sedated and not moving     LOS: 2 days       Subjective         Objective     Vital signs for last 24 hours:  Vitals:    01/02/21 0300 01/02/21 0609 01/02/21 0648 01/02/21 0651   BP:       BP Location:        Patient Position:       Pulse:   81 80   Resp: 20 20 20    Temp: 98.1 °F (36.7 °C) 97.7 °F (36.5 °C)     TempSrc: Oral Oral     SpO2:   91% 92%   Weight:       Height:           Intake/Output last 3 shifts:  I/O last 3 completed shifts:  In: 480 [P.O.:480]  Out: 1850 [Urine:1850]  Intake/Output this shift:  No intake/output data recorded.      Radiology  Imaging Results (Last 24 Hours)     ** No results found for the last 24 hours. **          Labs:  Results from last 7 days   Lab Units 01/02/21  0231   WBC 10*3/mm3 14.40*   HEMOGLOBIN g/dL 9.7*   HEMATOCRIT % 31.3*   PLATELETS 10*3/mm3 159     Results from last 7 days   Lab Units 01/02/21  0231   SODIUM mmol/L 139   POTASSIUM mmol/L 4.6   CHLORIDE mmol/L 102   CO2 mmol/L 27.0   BUN mg/dL 19   CREATININE mg/dL 1.03*   CALCIUM mg/dL 9.1   GLUCOSE mg/dL 112*     Results from last 7 days   Lab Units 01/01/21  1521   PH, ARTERIAL pH units 7.355   PO2 ART mm Hg 63.4*   PCO2, ARTERIAL mm Hg 51.9*   HCO3 ART mmol/L 29.0*                                   Meds:   SCHEDULE  amLODIPine, 10 mg, Oral, Daily With Lunch  budesonide-formoterol, 2 puff, Inhalation, BID - RT  docusate sodium, 100 mg, Oral, BID  ferrous sulfate, 324 mg, Oral, Once per day on Tue Thu Sat  guaiFENesin, 600 mg, Oral, Q12H  ipratropium-albuterol, 3 mL, Nebulization, Q4H - RT  levothyroxine, 100 mcg, Oral, QAM AC  pantoprazole, 40 mg, Oral, Q AM  piperacillin-tazobactam, 3.375 g, Intravenous, Q8H  rosuvastatin, 10 mg, Oral, Nightly  sucralfate, 1 g, Oral, 4x Daily AC & at Bedtime      Infusions  lactated ringers, 9 mL/hr, Last Rate: Stopped (12/30/20 2009)  phenylephrine, 0.5-3 mcg/kg/min      PRNs  •  acetaminophen **OR** acetaminophen  •  HYDROcodone-acetaminophen  •  influenza vaccine  •  lactated ringers  •  metoclopramide  •  Morphine  •  [DISCONTINUED] HYDROmorphone **AND** naloxone  •  [DISCONTINUED] Morphine **AND** naloxone  •  ondansetron **OR** ondansetron  •  oxyCODONE  •  phenylephrine  •   promethazine **OR** promethazine  •  risperiDONE    Physical Exam:  Physical Exam  Vitals signs reviewed.   Pulmonary:      Breath sounds: Wheezing and rhonchi present.   Abdominal:      General: There is distension.      Tenderness: There is abdominal tenderness.   Musculoskeletal:         General: Swelling present.   Skin:     General: Skin is warm and dry.   Neurological:      Mental Status: She is alert and oriented to person, place, and time.         ROS  Review of Systems   Constitutional: Positive for activity change and fatigue.   Respiratory: Positive for shortness of breath.    Gastrointestinal: Positive for abdominal distention and abdominal pain.

## 2021-01-03 LAB
ANION GAP SERPL CALCULATED.3IONS-SCNC: 12 MMOL/L (ref 5–15)
BASOPHILS # BLD MANUAL: 0.13 10*3/MM3 (ref 0–0.2)
BASOPHILS NFR BLD AUTO: 1 % (ref 0–1.5)
BUN SERPL-MCNC: 23 MG/DL (ref 8–23)
BUN/CREAT SERPL: 22.5 (ref 7–25)
CALCIUM SPEC-SCNC: 9.2 MG/DL (ref 8.6–10.5)
CHLORIDE SERPL-SCNC: 101 MMOL/L (ref 98–107)
CO2 SERPL-SCNC: 24 MMOL/L (ref 22–29)
CREAT SERPL-MCNC: 1.02 MG/DL (ref 0.57–1)
DEPRECATED RDW RBC AUTO: 45.5 FL (ref 37–54)
EOSINOPHIL # BLD MANUAL: 0.13 10*3/MM3 (ref 0–0.4)
EOSINOPHIL NFR BLD MANUAL: 1 % (ref 0.3–6.2)
ERYTHROCYTE [DISTWIDTH] IN BLOOD BY AUTOMATED COUNT: 15.4 % (ref 12.3–15.4)
GFR SERPL CREATININE-BSD FRML MDRD: 52 ML/MIN/1.73
GLUCOSE SERPL-MCNC: 88 MG/DL (ref 65–99)
HCT VFR BLD AUTO: 30.8 % (ref 34–46.6)
HGB BLD-MCNC: 9.7 G/DL (ref 12–15.9)
LYMPHOCYTES # BLD MANUAL: 6.73 10*3/MM3 (ref 0.7–3.1)
LYMPHOCYTES NFR BLD MANUAL: 1 % (ref 5–12)
LYMPHOCYTES NFR BLD MANUAL: 51 % (ref 19.6–45.3)
MCH RBC QN AUTO: 26.1 PG (ref 26.6–33)
MCHC RBC AUTO-ENTMCNC: 31.5 G/DL (ref 31.5–35.7)
MCV RBC AUTO: 83 FL (ref 79–97)
MONOCYTES # BLD AUTO: 0.13 10*3/MM3 (ref 0.1–0.9)
NEUTROPHILS # BLD AUTO: 6.07 10*3/MM3 (ref 1.7–7)
NEUTROPHILS NFR BLD MANUAL: 45 % (ref 42.7–76)
NEUTS BAND NFR BLD MANUAL: 1 % (ref 0–5)
PLAT MORPH BLD: NORMAL
PLATELET # BLD AUTO: 181 10*3/MM3 (ref 140–450)
PMV BLD AUTO: 9.4 FL (ref 6–12)
POTASSIUM SERPL-SCNC: 4.4 MMOL/L (ref 3.5–5.2)
RBC # BLD AUTO: 3.7 10*6/MM3 (ref 3.77–5.28)
RBC MORPH BLD: NORMAL
SCAN SLIDE: NORMAL
SODIUM SERPL-SCNC: 137 MMOL/L (ref 136–145)
WBC # BLD AUTO: 13.2 10*3/MM3 (ref 3.4–10.8)
WBC MORPH BLD: NORMAL

## 2021-01-03 PROCEDURE — 80048 BASIC METABOLIC PNL TOTAL CA: CPT | Performed by: INTERNAL MEDICINE

## 2021-01-03 PROCEDURE — 94799 UNLISTED PULMONARY SVC/PX: CPT

## 2021-01-03 PROCEDURE — 85025 COMPLETE CBC W/AUTO DIFF WBC: CPT | Performed by: INTERNAL MEDICINE

## 2021-01-03 PROCEDURE — 99024 POSTOP FOLLOW-UP VISIT: CPT | Performed by: SURGERY

## 2021-01-03 PROCEDURE — 85007 BL SMEAR W/DIFF WBC COUNT: CPT | Performed by: INTERNAL MEDICINE

## 2021-01-03 PROCEDURE — 25010000002 KETOROLAC TROMETHAMINE PER 15 MG: Performed by: SURGERY

## 2021-01-03 PROCEDURE — 25010000002 METOCLOPRAMIDE PER 10 MG: Performed by: SURGERY

## 2021-01-03 PROCEDURE — 97116 GAIT TRAINING THERAPY: CPT

## 2021-01-03 PROCEDURE — 99232 SBSQ HOSP IP/OBS MODERATE 35: CPT | Performed by: INTERNAL MEDICINE

## 2021-01-03 PROCEDURE — 97162 PT EVAL MOD COMPLEX 30 MIN: CPT

## 2021-01-03 PROCEDURE — 25010000002 PIPERACILLIN SOD-TAZOBACTAM PER 1 G: Performed by: INTERNAL MEDICINE

## 2021-01-03 RX ORDER — DOCUSATE SODIUM 100 MG/1
100 CAPSULE, LIQUID FILLED ORAL 2 TIMES DAILY
Status: DISCONTINUED | OUTPATIENT
Start: 2021-01-03 | End: 2021-01-03 | Stop reason: SDUPTHER

## 2021-01-03 RX ORDER — POLYETHYLENE GLYCOL 3350 17 G/17G
17 POWDER, FOR SOLUTION ORAL DAILY
Status: DISCONTINUED | OUTPATIENT
Start: 2021-01-03 | End: 2021-01-13 | Stop reason: HOSPADM

## 2021-01-03 RX ORDER — RISPERIDONE 0.5 MG/1
0.25 TABLET, ORALLY DISINTEGRATING ORAL 2 TIMES DAILY PRN
Status: DISCONTINUED | OUTPATIENT
Start: 2021-01-03 | End: 2021-01-09

## 2021-01-03 RX ADMIN — PANTOPRAZOLE SODIUM 40 MG: 40 TABLET, DELAYED RELEASE ORAL at 06:14

## 2021-01-03 RX ADMIN — PIPERACILLIN AND TAZOBACTAM 3.38 G: 3; .375 INJECTION, POWDER, LYOPHILIZED, FOR SOLUTION INTRAVENOUS; PARENTERAL at 06:14

## 2021-01-03 RX ADMIN — POLYETHYLENE GLYCOL 3350 17 G: 17 POWDER, FOR SOLUTION ORAL at 13:38

## 2021-01-03 RX ADMIN — IPRATROPIUM BROMIDE AND ALBUTEROL SULFATE 3 ML: 2.5; .5 SOLUTION RESPIRATORY (INHALATION) at 14:43

## 2021-01-03 RX ADMIN — IPRATROPIUM BROMIDE AND ALBUTEROL SULFATE 3 ML: 2.5; .5 SOLUTION RESPIRATORY (INHALATION) at 22:51

## 2021-01-03 RX ADMIN — IPRATROPIUM BROMIDE AND ALBUTEROL SULFATE 3 ML: 2.5; .5 SOLUTION RESPIRATORY (INHALATION) at 06:40

## 2021-01-03 RX ADMIN — GUAIFENESIN 600 MG: 600 TABLET, EXTENDED RELEASE ORAL at 07:25

## 2021-01-03 RX ADMIN — PIPERACILLIN AND TAZOBACTAM 3.38 G: 3; .375 INJECTION, POWDER, LYOPHILIZED, FOR SOLUTION INTRAVENOUS; PARENTERAL at 20:28

## 2021-01-03 RX ADMIN — BUDESONIDE AND FORMOTEROL FUMARATE DIHYDRATE 2 PUFF: 160; 4.5 AEROSOL RESPIRATORY (INHALATION) at 20:39

## 2021-01-03 RX ADMIN — IPRATROPIUM BROMIDE AND ALBUTEROL SULFATE 3 ML: 2.5; .5 SOLUTION RESPIRATORY (INHALATION) at 02:13

## 2021-01-03 RX ADMIN — METOCLOPRAMIDE HYDROCHLORIDE 10 MG: 5 INJECTION INTRAMUSCULAR; INTRAVENOUS at 17:24

## 2021-01-03 RX ADMIN — BUDESONIDE AND FORMOTEROL FUMARATE DIHYDRATE 2 PUFF: 160; 4.5 AEROSOL RESPIRATORY (INHALATION) at 06:40

## 2021-01-03 RX ADMIN — KETOROLAC TROMETHAMINE 15 MG: 30 INJECTION, SOLUTION INTRAMUSCULAR at 20:30

## 2021-01-03 RX ADMIN — DOCUSATE SODIUM 100 MG: 100 CAPSULE, LIQUID FILLED ORAL at 20:29

## 2021-01-03 RX ADMIN — ACETAMINOPHEN 650 MG: 325 TABLET, FILM COATED ORAL at 13:38

## 2021-01-03 RX ADMIN — LEVOTHYROXINE SODIUM 100 MCG: 125 TABLET ORAL at 06:14

## 2021-01-03 RX ADMIN — ACETAMINOPHEN 650 MG: 325 TABLET, FILM COATED ORAL at 07:25

## 2021-01-03 RX ADMIN — KETOROLAC TROMETHAMINE 15 MG: 30 INJECTION, SOLUTION INTRAMUSCULAR at 07:25

## 2021-01-03 RX ADMIN — IPRATROPIUM BROMIDE AND ALBUTEROL SULFATE 3 ML: 2.5; .5 SOLUTION RESPIRATORY (INHALATION) at 20:39

## 2021-01-03 RX ADMIN — DOCUSATE SODIUM 100 MG: 100 CAPSULE, LIQUID FILLED ORAL at 07:25

## 2021-01-03 RX ADMIN — METOCLOPRAMIDE HYDROCHLORIDE 10 MG: 5 INJECTION INTRAMUSCULAR; INTRAVENOUS at 23:36

## 2021-01-03 RX ADMIN — GUAIFENESIN 600 MG: 600 TABLET, EXTENDED RELEASE ORAL at 20:29

## 2021-01-03 RX ADMIN — AMLODIPINE BESYLATE 10 MG: 5 TABLET ORAL at 13:38

## 2021-01-03 RX ADMIN — ROSUVASTATIN CALCIUM 10 MG: 10 TABLET, FILM COATED ORAL at 20:29

## 2021-01-03 RX ADMIN — METOCLOPRAMIDE HYDROCHLORIDE 10 MG: 5 INJECTION INTRAMUSCULAR; INTRAVENOUS at 06:14

## 2021-01-03 RX ADMIN — IPRATROPIUM BROMIDE AND ALBUTEROL SULFATE 3 ML: 2.5; .5 SOLUTION RESPIRATORY (INHALATION) at 10:15

## 2021-01-03 NOTE — PLAN OF CARE
Goal Outcome Evaluation:  Plan of Care Reviewed With: patient, daughter  Progress: no change  Outcome Summary: Pt extremely anxious. She is on PF 40/100% and when she has anxiety attacks also requires the NRB. Pt pain meds decreased due to sedation and shallow breathing, dropping her SATS. Surgery wanted an NG placed for decompression to prevent a possible illeus, but placement was unsuccessful and surgery said to hold off on the NG for now. Pt instructed to cough and deep breath, frequenty encouraged to use her IS. Daughter at bedside. Will continue to monitor

## 2021-01-03 NOTE — PROGRESS NOTES
Daily Progress Note    Hiatal hernia with gastroesophageal reflux    LIBBY (obstructive sleep apnea)    Nocturnal hypoxia    Acute respiratory failure with hypoxia and hypercapnia (CMS/HCC)    Abdominal pain    Assessment    Hiatal hernia with gastroesophageal reflux  s/p abd sx repair of large hiatal hernia 12/30/20  Surgery following    Acute respiratory failure with hypoxia and hypercapnia   CXR reviewed, right basilar atelectasis, consolidation in left lung base  Patient has required higher amounts of oxygen and is refusing to wear AVAP due to claustraphobia     Office note  Severe obstructive sleep apnea  home sleep study showed apnea-hypopnea index 34.5  Chronic hypoxic respiratory insufficiency  on oxygen 3 L.     Completed treatment for MAC,  significant improvement in oxygen and weight   Overnight oximetry showed significant nocturnal hypoxia   BAL February 2019 positive for MAC patient has bilateral lower lobe bronchiectasis chronic cough weight loss and worsening hypoxia ,  start treatment in March 2019  Large hiatal hernia with reflux, s/p repeat bronchoscopy  on 09/13/2019 negative MAC cultures ok to proceed with surgery for hernia surgery   History of aspiration secondary to vomiting      Breast cancer s/p 2-4 s/p Right simple mastectomy and [place] lymph node biopsy  CLL  Hypothyroidism  Hypertension  Diabetes  GERD  Hyperlipidemia  Osteoarthritis    Plan    Excision titration requiring high flow 40/100.     Plan was discussed with patient, sister, hospitalist and nurse that the patient needs to be out of bed 3 times a day for at least an hour utilize IS     Continue following supportive measures;  Symbicort and duo nebs  Utilize morphine for pain to assist with breathing and does not need to be in sedative state  Zosyn  Gi prophylaxis protonix  DVT prophylaxis per surgery  Diet per surgery  PT  IS/flutter         LOS: 3 days       Subjective         Objective     Vital signs for last 24  hours:  Vitals:    01/03/21 0218 01/03/21 0525 01/03/21 0640 01/03/21 0645   BP:  160/56     Pulse: 86 89 89 99   Resp: 22 22 22    Temp:  98.3 °F (36.8 °C)     TempSrc:  Oral     SpO2: 92% 95% 93% (!) 88%   Weight:  87.3 kg (192 lb 7.4 oz)     Height:           Intake/Output last 3 shifts:  I/O last 3 completed shifts:  In: 693 [I.V.:593; IV Piggyback:100]  Out: 800 [Urine:800]  Intake/Output this shift:  No intake/output data recorded.      Radiology  Imaging Results (Last 24 Hours)     Procedure Component Value Units Date/Time    XR Chest 1 View [[id number]] Collected: 01/02/21 1137     Updated: 01/02/21 1141    Narrative:      XR CHEST 1 VW-     Date of Exam: 1/2/2021 11:00 AM     Indication: dyspne; K21.9-Ivpmdz-xbfuseprje reflux disease without  esophagitis; K44.9-Diaphragmatic hernia without obstruction or gangrene.     Comparison: 12/31/2020     Technique: A single view of the chest was obtained.     FINDINGS:      Cardiomegaly is stable.  Pulmonary vessels are normal.  Lung  volumes are low.  There is persistent left lower lobe airspace  consolidation which is unchanged.  Right lung appears clear.  There are  small bilateral pleural effusions.  Bony structures are unremarkable.             Impression:            1.  Low volume inspiration with persistent left lower lobe airspace  consolidation which is unchanged.  2.  Small bilateral pleural effusions.        Electronically Signed By-Jan Vernon MD On:1/2/2021 11:39 AM  This report was finalized on [id number] by  Jan Vernon MD.          Labs:  Results from last 7 days   Lab Units 01/03/21  0652   WBC 10*3/mm3 13.20*   HEMOGLOBIN g/dL 9.7*   HEMATOCRIT % 30.8*   PLATELETS 10*3/mm3 181     Results from last 7 days   Lab Units 01/03/21  0652   SODIUM mmol/L 137   POTASSIUM mmol/L 4.4   CHLORIDE mmol/L 101   CO2 mmol/L 24.0   BUN mg/dL 23   CREATININE mg/dL 1.02*   CALCIUM mg/dL 9.2   GLUCOSE mg/dL 88     Results from last 7 days   Lab Units  01/02/21  1711   PH, ARTERIAL pH units 7.353   PO2 ART mm Hg 72.0*   PCO2, ARTERIAL mm Hg 48.2*   HCO3 ART mmol/L 26.8                                   Meds:   SCHEDULE  amLODIPine, 10 mg, Oral, Daily With Lunch  budesonide-formoterol, 2 puff, Inhalation, BID - RT  docusate sodium, 100 mg, Oral, BID  guaiFENesin, 600 mg, Oral, Q12H  ipratropium-albuterol, 3 mL, Nebulization, Q4H - RT  levothyroxine, 100 mcg, Oral, QAM AC  methylnaltrexone, 6 mg, Subcutaneous, Every Other Day  metoclopramide, 10 mg, Intravenous, Q6H  pantoprazole, 40 mg, Oral, Q AM  piperacillin-tazobactam, 3.375 g, Intravenous, Q8H  rosuvastatin, 10 mg, Oral, Nightly      Infusions  lactated ringers, 9 mL/hr, Last Rate: Stopped (12/30/20 2009)  phenylephrine, 0.5-3 mcg/kg/min      PRNs  •  acetaminophen **OR** acetaminophen  •  influenza vaccine  •  ketorolac  •  lactated ringers  •  Morphine  •  [DISCONTINUED] HYDROmorphone **AND** naloxone  •  [DISCONTINUED] Morphine **AND** naloxone  •  ondansetron **OR** ondansetron  •  oxyCODONE  •  phenylephrine  •  promethazine **OR** promethazine  •  risperiDONE  •  saline    Physical Exam:  Physical Exam  Vitals signs reviewed.   Pulmonary:      Breath sounds: Wheezing and rhonchi present.   Abdominal:      General: There is distension.      Tenderness: There is abdominal tenderness.   Musculoskeletal:         General: Swelling present.   Skin:     General: Skin is warm and dry.   Neurological:      Mental Status: She is alert and oriented to person, place, and time.         ROS  Review of Systems   Constitutional: Positive for activity change and fatigue.   Respiratory: Positive for shortness of breath.    Gastrointestinal: Positive for abdominal distention and abdominal pain.

## 2021-01-03 NOTE — PROGRESS NOTES
[facility] Medicine Services Daily Progress Note      Hospitalist Team  LOS 3 days      Patient Care Team:  Jemima Fontaine MD as PCP - General (Family Medicine)    Patient Location: 2105/1      Subjective   Subjective     Chief Complaint / Subjective  No chief complaint on file.  follow up shortness of breath       Brief Synopsis of Hospital Course/HPI     80-year-old female who was having issues with GERD was found to have hiatal hernia and is underwent fixation per general surgery.  She has been admitted postoperatively medicine has been consulted to help with her medical problems.  Her blood pressure stable she is having some apnea spells from the sedation but overall her breathing is stable.  No other issues.  As reported she is slightly sedated from the procedure and HPI was slightly limited.         Date::    12/31/20: had worsening respiratory status last night and has been on HFNC this am. Refusing to wear BIPAP although likely needs it fo LIBBY.    1/1/20: on BIPAP this am and thus HPI limited. Events of overnight noted was moved to higher level of care. O2 is stable today. Seems O2 worse when not deep breathing and when sleeping as has LIBBY.     1/2/21: no real improvement. Having issues with pain medication and sedation not allowing pulmonary toilet. Will not wear BIPAP    1/3/21: Slight improvement today.  Per the nurse she had been using her incentive spirometer and been more ambulatory.  Encourage patient with pulmonology at bedside to continue to increase activity    Review of Systems   Respiratory: Positive for shortness of breath. Negative for cough.    Gastrointestinal: Positive for abdominal pain.         Objective   Objective      Vital Signs  Temp:  [98 °F (36.7 °C)-98.7 °F (37.1 °C)] 98.1 °F (36.7 °C)  Heart Rate:  [] 85  Resp:  [20-28] 20  BP: (132-160)/(54-59) 141/54  Oxygen Therapy  SpO2: 92 %  Pulse Oximetry Type: Continuous  Device (Oxygen Therapy): other  "(see comments)(PF)  $ High Flow Nasal Cannula Set-Up: yes  Flow (L/min): 40  Oxygen Concentration (%): 100  Flowsheet Rows      First Filed Value   Admission Height  157.5 cm (62\") Documented at 12/16/2020 1444   Admission Weight  81.6 kg (180 lb) Documented at 12/16/2020 1444        Intake & Output (last 3 days)       12/31 0701 - 01/01 0700 01/01 0701 - 01/02 0700 01/02 0701 - 01/03 0700 01/03 0701 - 01/04 0700    P.O. 840       I.V. (mL/kg)   593 (6.8)     IV Piggyback   100     Total Intake(mL/kg) 840 (10)  693 (7.9)     Urine (mL/kg/hr) 750 (0.4) 1650 (0.8) 550 (0.3)     Total Output 750 1650 550     Net +90 -1650 +143             Urine Unmeasured Occurrence 1 x           Lines, Drains & Airways    Active LDAs     Name:   Placement date:   Placement time:   Site:   Days:    Peripheral IV 12/31/20 0938 Anterior;Left Forearm   12/31/20    0938    Forearm   less than 1                  Physical Exam:    Physical Exam  Vitals signs and nursing note reviewed.   Constitutional:       General: She is not in acute distress.  Eyes:      Pupils: Pupils are equal, round, and reactive to light.   Cardiovascular:      Rate and Rhythm: Normal rate.   Pulmonary:      Effort: No respiratory distress.      Breath sounds: No wheezing or rales.   Abdominal:      General: There is distension.      Palpations: Abdomen is soft.      Comments: TTP over the surgical site    Skin:     General: Skin is warm and dry.   Neurological:      Mental Status: She is alert.   Psychiatric:      Comments: anxious              Wounds (last 24 hours)      LDA Wound     Row Name 01/03/21 0725 01/03/21 0400 01/03/21 0000       Wound 12/30/20 1037 abdomen Incision    Wound - Properties Group Placement Date: 12/30/20  -AM Placement Time: 1037  -AM Location: abdomen  -AM Primary Wound Type: Incision  -AM    Closure  --  BRIANNE  -SL  BRIANNE  -SL    Base  dressing in place, unable to visualize  -RG  dressing in place, unable to visualize  -SL  dressing in place, " unable to visualize  -SL    Periwound  intact;dry  -RG  intact;dry  -SL  intact;dry  -SL    Retired Wound - Properties Group Date first assessed: 12/30/20  -AM Time first assessed: 1037  -AM Location: abdomen  -AM Primary Wound Type: Incision  -AM    Row Name 01/02/21 2000 01/02/21 1630          Wound 12/30/20 1037 abdomen Incision    Wound - Properties Group Placement Date: 12/30/20  -AM Placement Time: 1037  -AM Location: abdomen  -AM Primary Wound Type: Incision  -AM    Dressing Appearance  open to air;dry;no drainage  -SL  --     Closure  BRIANNE  -SL  BRIANNE  -RG     Base  dressing in place, unable to visualize  -SL  dressing in place, unable to visualize  -RG     Periwound  intact;dry  -SL  intact;dry  -RG     Retired Wound - Properties Group Date first assessed: 12/30/20  -AM Time first assessed: 1037  -AM Location: abdomen  -AM Primary Wound Type: Incision  -AM      User Key  (r) = Recorded By, (t) = Taken By, (c) = Cosigned By    Initials Name Provider Type    AM Ping Martinez, RN Registered Nurse    Tessie Payton RN Registered Nurse    Roberta Young RN Registered Nurse          Procedures:    Procedure(s):  Laparoscopic hiatal hernia repair with gastropexy          Results Review:     I reviewed the patient's new clinical results.      Lab Results (last 24 hours)     Procedure Component Value Units Date/Time    CBC & Differential [[id number]]  (Abnormal) Collected: 01/03/21 0652    Specimen: Blood Updated: 01/03/21 0752    Narrative:      The following orders were created for panel order CBC & Differential.  Procedure                               Abnormality         Status                     ---------                               -----------         ------                     Scan Slide[[id number]]                                       Final result               CBC Auto Differential[[id number]]        Abnormal            Final result                 Please view results for these tests on the  individual orders.    CBC Auto Differential [[id number]]  (Abnormal) Collected: 01/03/21 0652    Specimen: Blood Updated: 01/03/21 0752     WBC 13.20 10*3/mm3      RBC 3.70 10*6/mm3      Hemoglobin 9.7 g/dL      Hematocrit 30.8 %      MCV 83.0 fL      MCH 26.1 pg      MCHC 31.5 g/dL      RDW 15.4 %      RDW-SD 45.5 fl      MPV 9.4 fL      Platelets 181 10*3/mm3     Narrative:      The previously reported component NRBC is no longer being reported. Previous result was 0.1 /100 WBC (Reference Range: 0.0-0.2 /100 WBC) on 1/3/2021 at 0718 EST.    Scan Slide [[id number]] Collected: 01/03/21 0652    Specimen: Blood Updated: 01/03/21 0752     Scan Slide --     Comment: See Manual Differential Results       Manual Differential [[id number]]  (Abnormal) Collected: 01/03/21 0652    Specimen: Blood Updated: 01/03/21 0752     Neutrophil % 45.0 %      Lymphocyte % 51.0 %      Monocyte % 1.0 %      Eosinophil % 1.0 %      Basophil % 1.0 %      Bands %  1.0 %      Neutrophils Absolute 6.07 10*3/mm3      Lymphocytes Absolute 6.73 10*3/mm3      Monocytes Absolute 0.13 10*3/mm3      Eosinophils Absolute 0.13 10*3/mm3      Basophils Absolute 0.13 10*3/mm3      RBC Morphology Normal     WBC Morphology Normal     Platelet Morphology Normal    Basic Metabolic Panel [[id number]]  (Abnormal) Collected: 01/03/21 0652    Specimen: Blood Updated: 01/03/21 0747     Glucose 88 mg/dL      BUN 23 mg/dL      Creatinine 1.02 mg/dL      Sodium 137 mmol/L      Potassium 4.4 mmol/L      Comment: Slight hemolysis detected by analyzer. Results may be affected.        Chloride 101 mmol/L      CO2 24.0 mmol/L      Calcium 9.2 mg/dL      eGFR Non African Amer 52 mL/min/1.73      BUN/Creatinine Ratio 22.5     Anion Gap 12.0 mmol/L     Narrative:      GFR Normal >60  Chronic Kidney Disease <60  Kidney Failure <15      Blood Gas, Arterial - [[id number]]  (Abnormal) Collected: 01/02/21 1711    Specimen: Arterial Blood Updated: 01/02/21 1723     Site Left Brachial      Sai's Test Positive     pH, Arterial 7.353 pH units      pCO2, Arterial 48.2 mm Hg      pO2, Arterial 72.0 mm Hg      HCO3, Arterial 26.8 mmol/L      Base Excess, Arterial 0.7 mmol/L      Comment: Serial Number: [id number]:  [id number]        O2 Saturation, Arterial 93.3 %      CO2 Content 28.3 mmol/L      Barometric Pressure for Blood Gas --     Comment: N/A        Modality Venti Mask     FIO2 100 %      PEEP 40     Hemodilution No        No results found for: HGBA1C        Results from last 7 days   Lab Units 01/02/21  1711   PH, ARTERIAL pH units 7.353   PO2 ART mm Hg 72.0*   PCO2, ARTERIAL mm Hg 48.2*   HCO3 ART mmol/L 26.8     No results found for: LIPASE  Lab Results   Component Value Date    CHOL 374 (H) 01/03/2020    TRIG 311 (H) 01/03/2020    HDL 50 01/03/2020     (H) 01/03/2020       Lab Results   Lab Value Date/Time    FINALDX  12/31/2020 0205     Leukocytosis with absolute atypical lymphocytosis  Anemia  No blasts identified  If CBC indices persist/worsen, consider flow cytometry or other studies as clinically indicated to exclude a lymphoproliferative disorder      FINALDX  02/06/2020 1405     Absolute atypical lymphocytosis.  Anemia.  No blasts identified.  Recommend clinical follow-up and additional studies to include flow cytometry as clinically indicated to exclude CLL/SLL or other lymphoproliferative disorder.         Microbiology Results (last 10 days)     Procedure Component Value - Date/Time    COVID-19,APTIMA PANTHER,[facility]E IN-HOUSE, NP/OP SWAB IN UTM/VTM/SALINE TRANSPORT MEDIA,24 HR TAT - Swab, Nasopharynx [[id number]]  (Normal) Collected: 12/28/20 1227    Lab Status: Final result Specimen: Swab from Nasopharynx Updated: 12/28/20 2046     COVID19 Not Detected    Narrative:      Fact sheet for providers: https://www.fda.gov/media/958316/download     Fact sheet for patients: https://www.fda.gov/media/012136/download    Test performed by PCR.          ECG/EMG Results (most recent)      Procedure Component Value Units Date/Time    ECG 12 Lead [[id number]] Collected: 12/30/20 1314     Updated: 01/02/21 1511     QT Interval 421 ms     Narrative:      HEART RATE= 71  bpm  RR Interval= 844  ms  PA Interval= 172  ms  P Horizontal Axis= -9  deg  P Front Axis= 48  deg  QRSD Interval= 101  ms  QT Interval= 421  ms  QRS Axis= 41  deg  T Wave Axis= 77  deg  - ABNORMAL ECG -  Sinus rhythm With old anterior septal MI  Low voltage, precordial leads  When compared with ECG of 18-Dec-2020 14:29:46,  Poor R wave progression V1 to V3 noted  Electronically Signed By: Fausto Jean (PAVAN) 02-Jan-2021 15:05:56  Date and Time of Study: 2020-12-30 13:14:52                    Xr Chest 1 View    Result Date: 1/2/2021    1.  Low volume inspiration with persistent left lower lobe airspace consolidation which is unchanged. 2.  Small bilateral pleural effusions.   Electronically Signed By-Jan Vernon MD On:1/2/2021 11:39 AM This report was finalized on [id number] by  Jan Vernon MD.    Xr Chest 1 View    Result Date: 12/31/2020   1. Consolidation in the left lung base which appears new. 2. Lower lung volumes with right basilar atelectasis. 3. Cardiomegaly without acute cardiac decompensation. 4. Right basilar atelectasis.  Electronically Signed By-Den Connell MD On:12/31/2020 9:10 AM This report was finalized on [id number] by  Den Connell MD.          Xrays, labs reviewed personally by physician.    Medication Review:   I have reviewed the patient's current medication list      Scheduled Meds  amLODIPine, 10 mg, Oral, Daily With Lunch  budesonide-formoterol, 2 puff, Inhalation, BID - RT  docusate sodium, 100 mg, Oral, BID  guaiFENesin, 600 mg, Oral, Q12H  ipratropium-albuterol, 3 mL, Nebulization, Q4H - RT  levothyroxine, 100 mcg, Oral, QAM AC  methylnaltrexone, 6 mg, Subcutaneous, Every Other Day  metoclopramide, 10 mg, Intravenous, Q6H  pantoprazole, 40 mg, Oral, Q AM  piperacillin-tazobactam, 3.375 g,  Intravenous, Q8H  polyethylene glycol, 17 g, Oral, Daily  rosuvastatin, 10 mg, Oral, Nightly        Meds Infusions  lactated ringers, 9 mL/hr, Last Rate: Stopped (12/30/20 2009)  phenylephrine, 0.5-3 mcg/kg/min        Meds PRN  •  acetaminophen **OR** acetaminophen  •  influenza vaccine  •  ketorolac  •  lactated ringers  •  Morphine  •  [DISCONTINUED] HYDROmorphone **AND** naloxone  •  [DISCONTINUED] Morphine **AND** naloxone  •  ondansetron **OR** ondansetron  •  oxyCODONE  •  phenylephrine  •  promethazine **OR** promethazine  •  risperiDONE  •  saline        Assessment/Plan   Assessment/Plan     Active Hospital Problems    Diagnosis  POA   • **Hiatal hernia with gastroesophageal reflux [K21.9, K44.9]  Unknown   • Acute respiratory failure with hypoxia and hypercapnia (CMS/HCC) [J96.01, J96.02]  Unknown   • Abdominal pain [R10.9]  Unknown   • Nocturnal hypoxia [G47.34]  Yes   • LIBBY (obstructive sleep apnea) [G47.33]  Unknown      Resolved Hospital Problems   No resolved problems to display.       MEDICAL DECISION MAKING COMPLEXITY BY PROBLEM:     Acute on chronic hypoxic respiratory failure, LIBBY  -likely realted to narcotics and LIBBY vs LLL CAP  -CXR 12/31/20: new LLL infiltrate  -CT chest reviewed b/l LL atelectasis vs infiltrate  -Oxygen on precision flow, wean   -has been refusing AVAP, encourage use   -Nebulizers, symbicort  -c/w Zosyn  -encourage aggressive pulmonary toilet  -pulmonary following    Hiatal hernia with GERD   Possible postoperative ileus  -S/p laparoscopic hiatal hernia repair with gastropexy  -General surgery primary postop per them  -PPI, carafate, reglan prn  -bowel regimen per surgery      Hypertension  -Home meds  -Monitor and adjust blood pressure prn    CLL  -WBC chronically elevated, improved to 13 today (baseline 15-20 range)  -Follow CBC while here      VTE Prophylaxis -   Mechanical Order History:      Ordered        12/30/20 1513  Place Sequential Compression Device  Once          12/30/20 1513  Place Sequential Compression Device  Once         12/30/20 1513  Maintain Sequential Compression Device  Continuous                 Pharmalogical Order History:     None            Code Status -   Code Status and Medical Interventions:   Ordered at: 12/30/20 1513     Code Status:    CPR     Medical Interventions (Level of Support Prior to Arrest):    Full       This patient has been examined wearing appropriate Personal Protective Equipment. 01/03/21        Discharge Planning  Respiratory status will have to improve before d/c         Electronically signed by Bob Lewis DO, 01/03/21, 14:35 EST.  [facility] Hospitalist Team

## 2021-01-03 NOTE — PLAN OF CARE
Goal Outcome Evaluation:  Plan of Care Reviewed With: patient  Progress: no change  Outcome Summary: 81 yo female s/p laprascopic hernia repair 12/30.  Pt with post op resp complications and is now in Precision flow.  Pt is from home with spouse, normally independent.  Pt is anxious and needs encouragement to increase activity.  Pt able to use RW to walk in room with assist to manage O2 lines and IV.  Up to chair and worked on IS.  Will follow 5x/week, goal for home with HHPT. PPE worn:  gloves, mask, goggles.

## 2021-01-03 NOTE — PROGRESS NOTES
GENERAL SURGERY PROGRESS NOTE    1/3/2021   LOS: 3 days   Patient Care Team:  Jemima Fontaine MD as PCP - General (Family Medicine)    HIATAL HERNIA REPAIR LAPAROSCOPIC  12/30/2020  Chief Complaint: giant hiatal hernia with pulmonary compromise  Subjective   HPI: Patient is a 80 year old lady s/p laparoscopic hiatal hernia repair with gastropexy for giant hiatal hernia with pulmonary compromise on 12/30/2020.     Interval History:   Yesterday nasogastric tube was attempted however the patient could not tolerate it and was not able to be placed.  She thinks she might of passed some flatus this morning.  Overall she is feeling a bit better today than yesterday.  She is still requiring precision flow.  Objective     Vital Signs  Temp:  [98 °F (36.7 °C)-98.7 °F (37.1 °C)] 98.1 °F (36.7 °C)  Heart Rate:  [] 85  Resp:  [18-28] 20  BP: (132-160)/(48-59) 141/54    Physical Exam  Vitals signs reviewed.   Constitutional:       General: She is in acute distress.      Appearance: She is not toxic-appearing.   Cardiovascular:      Rate and Rhythm: Normal rate and regular rhythm.   Pulmonary:      Effort: Tachypnea present.      Breath sounds: Normal breath sounds.      Comments: On high flow nasal cannula  Abdominal:      Comments: The abdomen is soft, appropriately tender, incisions are well approximated with dressings overlying the skin incisions.  There is no surrounding erythema, induration, or drainage to suggest infection.  The abdomen is moderately distended.   Neurological:      Mental Status: She is alert.   Psychiatric:         Mood and Affect: Mood normal.         Behavior: Behavior normal.          Results Review:    Lab Results (last 24 hours)     Procedure Component Value Units Date/Time    CBC & Differential [[id number]]  (Abnormal) Collected: 01/03/21 0652    Specimen: Blood Updated: 01/03/21 3063    Narrative:      The following orders were created for panel order CBC & Differential.  Procedure                                Abnormality         Status                     ---------                               -----------         ------                     Scan Slide[[id number]]                                       Final result               CBC Auto Differential[[id number]]        Abnormal            Final result                 Please view results for these tests on the individual orders.    CBC Auto Differential [[id number]]  (Abnormal) Collected: 01/03/21 0652    Specimen: Blood Updated: 01/03/21 0752     WBC 13.20 10*3/mm3      RBC 3.70 10*6/mm3      Hemoglobin 9.7 g/dL      Hematocrit 30.8 %      MCV 83.0 fL      MCH 26.1 pg      MCHC 31.5 g/dL      RDW 15.4 %      RDW-SD 45.5 fl      MPV 9.4 fL      Platelets 181 10*3/mm3     Narrative:      The previously reported component NRBC is no longer being reported. Previous result was 0.1 /100 WBC (Reference Range: 0.0-0.2 /100 WBC) on 1/3/2021 at 0718 EST.    Scan Slide [[id number]] Collected: 01/03/21 0652    Specimen: Blood Updated: 01/03/21 0752     Scan Slide --     Comment: See Manual Differential Results       Manual Differential [[id number]]  (Abnormal) Collected: 01/03/21 0652    Specimen: Blood Updated: 01/03/21 0752     Neutrophil % 45.0 %      Lymphocyte % 51.0 %      Monocyte % 1.0 %      Eosinophil % 1.0 %      Basophil % 1.0 %      Bands %  1.0 %      Neutrophils Absolute 6.07 10*3/mm3      Lymphocytes Absolute 6.73 10*3/mm3      Monocytes Absolute 0.13 10*3/mm3      Eosinophils Absolute 0.13 10*3/mm3      Basophils Absolute 0.13 10*3/mm3      RBC Morphology Normal     WBC Morphology Normal     Platelet Morphology Normal    Basic Metabolic Panel [[id number]]  (Abnormal) Collected: 01/03/21 0652    Specimen: Blood Updated: 01/03/21 0747     Glucose 88 mg/dL      BUN 23 mg/dL      Creatinine 1.02 mg/dL      Sodium 137 mmol/L      Potassium 4.4 mmol/L      Comment: Slight hemolysis detected by analyzer. Results may be affected.        Chloride 101 mmol/L       CO2 24.0 mmol/L      Calcium 9.2 mg/dL      eGFR Non African Amer 52 mL/min/1.73      BUN/Creatinine Ratio 22.5     Anion Gap 12.0 mmol/L     Narrative:      GFR Normal >60  Chronic Kidney Disease <60  Kidney Failure <15      Blood Gas, Arterial - [[id number]]  (Abnormal) Collected: 01/02/21 1711    Specimen: Arterial Blood Updated: 01/02/21 1723     Site Left Brachial     Sai's Test Positive     pH, Arterial 7.353 pH units      pCO2, Arterial 48.2 mm Hg      pO2, Arterial 72.0 mm Hg      HCO3, Arterial 26.8 mmol/L      Base Excess, Arterial 0.7 mmol/L      Comment: Serial Number: [id number]:  [id number]        O2 Saturation, Arterial 93.3 %      CO2 Content 28.3 mmol/L      Barometric Pressure for Blood Gas --     Comment: N/A        Modality Venti Mask     FIO2 100 %      PEEP 40     Hemodilution No        Imaging Results (Last 24 Hours)     Procedure Component Value Units Date/Time    XR Chest 1 View [[id number]] Collected: 01/02/21 1137     Updated: 01/02/21 1141    Narrative:      XR CHEST 1 VW-     Date of Exam: 1/2/2021 11:00 AM     Indication: dyspne; K21.6-Uzbehd-nyrvnosfju reflux disease without  esophagitis; K44.9-Diaphragmatic hernia without obstruction or gangrene.     Comparison: 12/31/2020     Technique: A single view of the chest was obtained.     FINDINGS:      Cardiomegaly is stable.  Pulmonary vessels are normal.  Lung  volumes are low.  There is persistent left lower lobe airspace  consolidation which is unchanged.  Right lung appears clear.  There are  small bilateral pleural effusions.  Bony structures are unremarkable.             Impression:            1.  Low volume inspiration with persistent left lower lobe airspace  consolidation which is unchanged.  2.  Small bilateral pleural effusions.        Electronically Signed By-Jan Vernon MD On:1/2/2021 11:39 AM  This report was finalized on [id number] by  Jan Vernon MD.           I have reviewed the above results and noted them  below    Medication Review:    Current Facility-Administered Medications:   •  acetaminophen (TYLENOL) tablet 650 mg, 650 mg, Oral, Q4H PRN, 650 mg at 01/03/21 0725 **OR** acetaminophen (TYLENOL) suppository 650 mg, 650 mg, Rectal, Q4H PRN, Den Plummer,   •  amLODIPine (NORVASC) tablet 10 mg, 10 mg, Oral, Daily With Lunch, Bob Lewis, , 10 mg at 01/02/21 1339  •  budesonide-formoterol (SYMBICORT) 160-4.5 MCG/ACT inhaler 2 puff, 2 puff, Inhalation, BID - RT, Bob Lewis DO, 2 puff at 01/03/21 0640  •  docusate sodium (COLACE) capsule 100 mg, 100 mg, Oral, BID, Bob Lewis, , 100 mg at 01/03/21 0725  •  guaiFENesin (MUCINEX) 12 hr tablet 600 mg, 600 mg, Oral, Q12H, Bob Lewis DO, 600 mg at 01/03/21 0725  •  influenza vac split quad (FLUZONE,FLUARIX,AFLURIA,FLULAVAL) injection 0.5 mL, 0.5 mL, Intramuscular, During Hospitalization, Bob Lewis DO  •  ipratropium-albuterol (DUO-NEB) nebulizer solution 3 mL, 3 mL, Nebulization, Q4H - RT, Bob Lewis DO, 3 mL at 01/03/21 1015  •  ketorolac (TORADOL) injection 15 mg, 15 mg, Intravenous, Q6H PRN, Shanna Goins MD, 15 mg at 01/03/21 0725  •  lactated ringers infusion, 9 mL/hr, Intravenous, Continuous PRN, Den Plummer DO, Stopped at 12/30/20 2009  •  levothyroxine (SYNTHROID, LEVOTHROID) tablet 100 mcg, 100 mcg, Oral, QAM AC, Bob Lewis DO, 100 mcg at 01/03/21 0614  •  methylnaltrexone (RELISTOR) injection 6 mg, 6 mg, Subcutaneous, Every Other Day, Shanna Goins MD, 6 mg at 01/02/21 1202  •  metoclopramide (REGLAN) injection 10 mg, 10 mg, Intravenous, Q6H, Shanna Goins MD, 10 mg at 01/03/21 0614  •  Morphine sulfate (PF) injection 2 mg, 2 mg, Intravenous, Q4H PRN, Bob Lewis DO, 2 mg at 01/02/21 1328  •  [DISCONTINUED] HYDROmorphone (DILAUDID) injection 0.5 mg, 0.5 mg, Intravenous, Q2H PRN, 0.5 mg at 12/31/20 1223 **AND** naloxone (NARCAN) injection 0.1 mg, 0.1 mg, Intravenous, Q5 Min PRN, Den Plummer DO  •  [DISCONTINUED] Morphine  sulfate (PF) injection 4 mg, 4 mg, Intravenous, Q2H PRN **AND** naloxone (NARCAN) injection 0.4 mg, 0.4 mg, Intravenous, Q5 Min PRN, Den Plummer, DO  •  ondansetron (ZOFRAN) tablet 4 mg, 4 mg, Oral, Q6H PRN **OR** ondansetron (ZOFRAN) injection 4 mg, 4 mg, Intravenous, Q6H PRN, Den Plummer, DO  •  oxyCODONE (ROXICODONE) immediate release tablet 5 mg, 5 mg, Oral, Q8H PRN, Shanna Goins MD  •  pantoprazole (PROTONIX) EC tablet 40 mg, 40 mg, Oral, Q AM, Den Plummer, DO, 40 mg at 01/03/21 0614  •  phenylephrine (AUDRA-SYNEPHRINE) 50 mg in 250 mL NS infusion, 0.5-3 mcg/kg/min, Intravenous, Continuous PRN, Den Plummer, DO  •  piperacillin-tazobactam (ZOSYN) IVPB 3.375 g in 100 mL NS (CD), 3.375 g, Intravenous, Q8H, Bob Lewis, DO, 3.375 g at 01/03/21 0614  •  promethazine (PHENERGAN) tablet 12.5 mg, 12.5 mg, Oral, Q6H PRN **OR** promethazine (PHENERGAN) suppository 12.5 mg, 12.5 mg, Rectal, Q6H PRN, Den Plummer, DO  •  risperiDONE (risperDAL M-TABS) disintegrating tablet 0.25 mg, 0.25 mg, Oral, BID PRN, Bob Lewis, DO  •  rosuvastatin (CRESTOR) tablet 10 mg, 10 mg, Oral, Nightly, Bob Lewis, DO, 10 mg at 01/02/21 2134  •  saline (AYR) nasal gel, , Topical, PRN, Bob Lewis, DO    Assessment/Plan     Principal Problem:    Hiatal hernia with gastroesophageal reflux  Active Problems:    LIBYB (obstructive sleep apnea)    Nocturnal hypoxia    Acute respiratory failure with hypoxia and hypercapnia (CMS/HCC)    Abdominal pain    Patient postop day 4 status post laparoscopic hiatal hernia repair with gastropexy  Having some respiratory issues postoperatively.      Patient has postoperative ileus and yesterday I told her to stop opioids and she is done a good job of stopping taking those.  She does have Toradol available as needed.  I am going to start her on clear liquids and MiraLAX today.  She is ambulating a bit in the room.    Shanna Goins MD  01/03/21  11:24 EST

## 2021-01-03 NOTE — THERAPY EVALUATION
Patient Name: Diane Guan  : 1940    MRN: [id number]                              Today's Date: 1/3/2021       Admit Date: 2020    Visit Dx:     ICD-10-CM ICD-9-CM   1. Hiatal hernia with gastroesophageal reflux  K21.9 530.81    K44.9 553.3     Patient Active Problem List   Diagnosis   • Abnormality of gait   • Mixed anxiety and depressive disorder   • Primary osteoarthritis involving multiple joints   • Breast cancer (CMS/[facility])   • Chronic lymphoid leukemia (CMS/[facility])   • Depression   • Gallbladder disorder   • Hiatal hernia with gastroesophageal reflux   • Mixed hyperlipidemia   • Essential hypertension   • Acquired hypothyroidism   • Type 2 diabetes mellitus without complication, without long-term current use of insulin (CMS/[facility])   • Insomnia   • Postmenopausal status   • Shoulder pain   • Overactive bladder   • Vitamin B 12 deficiency   • Seasonal allergies   • Absolute anemia   • Vitamin D deficiency   • LIBBY (obstructive sleep apnea)   • Nocturnal hypoxia   • Acute respiratory failure with hypoxia and hypercapnia (CMS/[facility])   • Abdominal pain     Past Medical History:   Diagnosis Date   • Acute bronchitis due to human metapneumovirus 2020   • Anxiety disorder    • Arthritis    • Breast cancer (CMS/[facility]) 2019    Invasive ductal carcinoma   • Chronic lymphocytic leukemia (CLL), B-cell (CMS/[facility]) 2019   • Depression    • Disease of thyroid gland    • Diverticulosis of colon 2018    Identified on colonoscopy   • Dysphagia 2020   • Dyspnea on exertion    • Hemorrhoid    • Hiatal hernia 2020   • Hiatal hernia with GERD    • History of diabetes mellitus     no meds now   • Hyperlipidemia    • Hypertension    • Mycobacterium avium complex (CMS/[facility]) 2019    aspiration pneumonia; completed abx therapy   • OAB (overactive bladder)    • Sleep apnea     daughter states pt does not have and doesn't have machine     Past Surgical History:   Procedure Laterality Date   • BLADDER SURGERY     •  BRONCHOSCOPY N/A 9/13/2019    Procedure: BRONCHOSCOPY with bronchial washing;  Surgeon: Marnie Frankel MD;  Location: [facility] ENDOSCOPY;  Service: Pulmonary   • COLONOSCOPY  07/19/2018    severe diverticulosis   • CYST REMOVAL      Removed a fatty cyst off of her back   • ENDOSCOPY N/A 11/23/2020    Procedure: ESOPHAGOGASTRODUODENOSCOPY with dilatation (Bougie # 48, 50, 52, 54, 56, 58);  Surgeon: Den Plummer DO;  Location: [facility] ENDOSCOPY;  Service: General;  Laterality: N/A;  Post: large hiatal hernia, joshua's ulcers   • HIATAL HERNIA REPAIR N/A 12/30/2020    Procedure: Laparoscopic hiatal hernia repair with gastropexy;  Surgeon: Den Plummer DO;  Location: [facility] MAIN OR;  Service: General;  Laterality: N/A;   • MASTECTOMY Right 02/04/2019    Invasive ductal carcinoma   • TUBAL ABDOMINAL LIGATION       General Information     [facility] Name 01/03/21 1654          Physical Therapy Time and Intention    Document Type  evaluation  -     Mode of Treatment  physical therapy  -[facility] Name 01/03/21 1654          General Information    Patient Profile Reviewed  yes  -     Prior Level of Function  independent:  -     Existing Precautions/Restrictions  fall;oxygen therapy device and L/min  -[facility] Name 01/03/21 1654          Living Environment    Lives With  spouse  -[facility] Name 01/03/21 1654          Home Main Entrance    Number of Stairs, Main Entrance  none  -[facility] Name 01/03/21 1654          Stairs Within Home, Primary    Number of Stairs, Within Home, Primary  none  -[facility] Name 01/03/21 1654          Cognition    Orientation Status (Cognition)  oriented x 3  -[facility] Name 01/03/21 1654          Safety Issues, Functional Mobility    Impairments Affecting Function (Mobility)  endurance/activity tolerance;strength;shortness of breath;pain  -       User Key  (r) = Recorded By, (t) = Taken By, (c) = Cosigned By    Initials Name Provider Type     Kinga Delatorre, PT Physical Therapist         Mobility     Row Name 01/03/21 1654          Bed Mobility    Bed Mobility  supine-sit  -     Supine-Sit [place] (Bed Mobility)  minimum assist (75% patient effort);1 person assist  -     Assistive Device (Bed Mobility)  head of bed elevated;bed rails  -     Comment (Bed Mobility)  moves slow, needs min A to scoot to EOB  -     Row Name 01/03/21 1654          Bed-Chair Transfer    Bed-Chair [place] (Transfers)  minimum assist (75% patient effort);1 person assist  -     Row Name 01/03/21 1654          Sit-Stand Transfer    Sit-Stand [place] (Transfers)  minimum assist (75% patient effort);1 person assist  -     Row Name 01/03/21 1654          Gait/Stairs (Locomotion)    [place] Level (Gait)  minimum assist (75% patient effort);1 person assist;1 person to manage equipment  -     Assistive Device (Gait)  walker, front-wheeled  -     Distance in Feet (Gait)  20' in room, managing lines for precision flow  -     Deviations/Abnormal Patterns (Gait)  festinating/shuffling  -     Bilateral Gait Deviations  forward flexed posture;heel strike decreased  -       User Key  (r) = Recorded By, (t) = Taken By, (c) = Cosigned By    Initials Name Provider Type     Kinga Delatorre PT Physical Therapist        Obj/Interventions     Row Name 01/03/21 1706          Range of Motion Comprehensive    General Range of Motion  bilateral upper extremity ROM WFL;bilateral lower extremity ROM WFL  -     Row Name 01/03/21 1706          Strength Comprehensive (MMT)    Comment, General Manual Muscle Testing (MMT) Assessment  gross LE strength 4/5  -     Row Name 01/03/21 1706          Balance    Balance Assessment  sitting static balance;standing static balance;standing dynamic balance;sitting dynamic balance  -     Static Sitting Balance  WFL;sitting, edge of bed  -     Dynamic Sitting Balance  WFL;sitting, edge of bed  -     Static Standing Balance  WFL;standing;supported  -     Dynamic  Standing Balance  mild impairment;supported;standing  -[facility] Name 01/03/21 1706          Sensory Assessment (Somatosensory)    Sensory Assessment (Somatosensory)  sensation intact  -       User Key  (r) = Recorded By, (t) = Taken By, (c) = Cosigned By    Initials Name Provider Type    Kinga Gage, PT Physical Therapist        Goals/Plan     [facility] Name 01/03/21 1711          Bed Mobility Goal 1 (PT)    Activity/Assistive Device (Bed Mobility Goal 1, PT)  bed mobility activities, all  -     [place] Level/Cues Needed (Bed Mobility Goal 1, PT)  independent  -JH     Time Frame (Bed Mobility Goal 1, PT)  long term goal (LTG);2 weeks  -[facility] Name 01/03/21 1711          Transfer Goal 1 (PT)    Activity/Assistive Device (Transfer Goal 1, PT)  sit-to-stand/stand-to-sit;bed-to-chair/chair-to-bed  -JH     [place] Level/Cues Needed (Transfer Goal 1, PT)  independent  -JH     Time Frame (Transfer Goal 1, PT)  long term goal (LTG);2 weeks  -[facility] Name 01/03/21 1711          Gait Training Goal 1 (PT)    Activity/Assistive Device (Gait Training Goal 1, PT)  gait (walking locomotion);assistive device use;walker, rolling  -     [place] Level (Gait Training Goal 1, PT)  modified independence  -     Distance (Gait Training Goal 1, PT)  150'  -JH     Time Frame (Gait Training Goal 1, PT)  long term goal (LTG);2 weeks  -       User Key  (r) = Recorded By, (t) = Taken By, (c) = Cosigned By    Initials Name Provider Type    Kinga Gage PT Physical Therapist        Clinical Impression     [facility] Name 01/03/21 1707          Pain    Additional Documentation  Pain Scale: FACES Pre/Post-Treatment (Group)  -[facility] Name 01/03/21 1707          Pain Scale: FACES Pre/Post-Treatment    Pain: FACES Scale, Pretreatment  2-->hurts little bit  -JH     Posttreatment Pain Rating  2-->hurts little bit  -     Pain Location  abdomen  -[facility] Name 01/03/21 1707          Plan of Care Review    Plan of Care  Reviewed With  patient  -     Outcome Summary  79 yo female s/p laprascopic hernia repair 12/30.  Pt with post op resp complications and is now in Precision flow.  Pt is from home with spouse, normally independent.  Pt is anxious and needs encouragement to increase activity.  Pt able to use RW to walk in room with assist to manage O2 lines and IV.  Up to chair and worked on IS.  Will follow 5x/week, goal for home with HHPT. PPE worn:  gloves, mask, goggles.  -     Row Name 01/03/21 1707          Therapy Assessment/Plan (PT)    Rehab Potential (PT)  good, to achieve stated therapy goals  -     Criteria for Skilled Interventions Met (PT)  yes;skilled treatment is necessary  -     Row Name 01/03/21 1707          Vital Signs    Pre SpO2 (%)  97  -     O2 Delivery Pre Treatment  -- precision flow 40/100  -     Intra SpO2 (%)  88  -     Post SpO2 (%)  96  -     Row Name 01/03/21 1707          Positioning and Restraints    Pre-Treatment Position  in bed  -     Post Treatment Position  chair  -     In Chair  notified nsg;sitting;call light within reach;with family/caregiver  -       User Key  (r) = Recorded By, (t) = Taken By, (c) = Cosigned By    Initials Name Provider Type     Kinga Delatorre PT Physical Therapist        Outcome Measures    No documentation.       Physical Therapy Education                 Title: PT OT SLP Therapies (Done)     Topic: Physical Therapy (Done)     Point: Mobility training (Done)     Learning Progress Summary           Patient Acceptance, E,TB, VU by  at 1/3/2021 1712    Acceptance, E,TB, VU by MI at 12/31/2020 1121                   Point: Precautions (Done)     Learning Progress Summary           Patient Acceptance, E,TB, VU by MI at 12/31/2020 1121                               User Key     Initials Effective Dates Name Provider Type Discipline     03/01/19 -  Kinga Delatorre PT Physical Therapist PT    MI 03/01/19 -  Ubaldo Elder, RN Registered Nurse Nurse               PT Recommendation and Plan  Planned Therapy Interventions (PT): balance training, bed mobility training, gait training, transfer training, strengthening, patient/family education  Plan of Care Reviewed With: patient  Outcome Summary: 79 yo female s/p laprascopic hernia repair 12/30.  Pt with post op resp complications and is now in Precision flow.  Pt is from home with spouse, normally independent.  Pt is anxious and needs encouragement to increase activity.  Pt able to use RW to walk in room with assist to manage O2 lines and IV.  Up to chair and worked on IS.  Will follow 5x/week, goal for home with HHPT. PPE worn:  gloves, mask, goggles.     Time Calculation:   PT Charges     Row Name 01/03/21 1712             Time Calculation    Start Time  1036  -      Stop Time  1100  -      Time Calculation (min)  24 min  -      PT Received On  01/03/21  -      PT - Next Appointment  01/04/21  -      PT Goal Re-Cert Due Date  01/17/21  -         Time Calculation- PT    Total Timed Code Minutes- PT  8 minute(s)  -        User Key  (r) = Recorded By, (t) = Taken By, (c) = Cosigned By    Initials Name Provider Type     Kinga Delatorre PT Physical Therapist        Therapy Charges for Today     Code Description Service Date Service Provider Modifiers Qty    86367975798 HC PT EVAL MOD COMPLEXITY 2 1/3/2021 Kinga Delatorre, PT GP 1    95559872183 HC GAIT TRAINING EA 15 MIN 1/3/2021 Kinga Delatorre PT GP 1               Kinga Delatorre PT  1/3/2021

## 2021-01-04 LAB
ANION GAP SERPL CALCULATED.3IONS-SCNC: 11 MMOL/L (ref 5–15)
BUN SERPL-MCNC: 23 MG/DL (ref 8–23)
BUN/CREAT SERPL: 22.3 (ref 7–25)
CALCIUM SPEC-SCNC: 9 MG/DL (ref 8.6–10.5)
CHLORIDE SERPL-SCNC: 103 MMOL/L (ref 98–107)
CO2 SERPL-SCNC: 23 MMOL/L (ref 22–29)
CREAT SERPL-MCNC: 1.03 MG/DL (ref 0.57–1)
DEPRECATED RDW RBC AUTO: 43.8 FL (ref 37–54)
EOSINOPHIL # BLD MANUAL: 0.4 10*3/MM3 (ref 0–0.4)
EOSINOPHIL NFR BLD MANUAL: 4 % (ref 0.3–6.2)
ERYTHROCYTE [DISTWIDTH] IN BLOOD BY AUTOMATED COUNT: 14.9 % (ref 12.3–15.4)
GFR SERPL CREATININE-BSD FRML MDRD: 52 ML/MIN/1.73
GLUCOSE BLDC GLUCOMTR-MCNC: 136 MG/DL (ref 70–105)
GLUCOSE SERPL-MCNC: 99 MG/DL (ref 65–99)
HCT VFR BLD AUTO: 29.7 % (ref 34–46.6)
HGB BLD-MCNC: 9.7 G/DL (ref 12–15.9)
LYMPHOCYTES # BLD MANUAL: 5.3 10*3/MM3 (ref 0.7–3.1)
LYMPHOCYTES NFR BLD MANUAL: 2 % (ref 5–12)
LYMPHOCYTES NFR BLD MANUAL: 53 % (ref 19.6–45.3)
MCH RBC QN AUTO: 27.4 PG (ref 26.6–33)
MCHC RBC AUTO-ENTMCNC: 32.6 G/DL (ref 31.5–35.7)
MCV RBC AUTO: 84.1 FL (ref 79–97)
MONOCYTES # BLD AUTO: 0.2 10*3/MM3 (ref 0.1–0.9)
NEUTROPHILS # BLD AUTO: 4.1 10*3/MM3 (ref 1.7–7)
NEUTROPHILS NFR BLD MANUAL: 41 % (ref 42.7–76)
PLAT MORPH BLD: NORMAL
PLATELET # BLD AUTO: 150 10*3/MM3 (ref 140–450)
PMV BLD AUTO: 9.9 FL (ref 6–12)
POTASSIUM SERPL-SCNC: 4 MMOL/L (ref 3.5–5.2)
RBC # BLD AUTO: 3.53 10*6/MM3 (ref 3.77–5.28)
RBC MORPH BLD: NORMAL
SCAN SLIDE: NORMAL
SODIUM SERPL-SCNC: 137 MMOL/L (ref 136–145)
WBC # BLD AUTO: 10 10*3/MM3 (ref 3.4–10.8)
WBC MORPH BLD: NORMAL

## 2021-01-04 PROCEDURE — 25010000002 PIPERACILLIN SOD-TAZOBACTAM PER 1 G: Performed by: INTERNAL MEDICINE

## 2021-01-04 PROCEDURE — 85007 BL SMEAR W/DIFF WBC COUNT: CPT | Performed by: INTERNAL MEDICINE

## 2021-01-04 PROCEDURE — 36410 VNPNXR 3YR/> PHY/QHP DX/THER: CPT

## 2021-01-04 PROCEDURE — 85025 COMPLETE CBC W/AUTO DIFF WBC: CPT | Performed by: INTERNAL MEDICINE

## 2021-01-04 PROCEDURE — C1751 CATH, INF, PER/CENT/MIDLINE: HCPCS

## 2021-01-04 PROCEDURE — 97116 GAIT TRAINING THERAPY: CPT

## 2021-01-04 PROCEDURE — 94660 CPAP INITIATION&MGMT: CPT

## 2021-01-04 PROCEDURE — 94799 UNLISTED PULMONARY SVC/PX: CPT

## 2021-01-04 PROCEDURE — 25010000002 METHYLNALTREXONE 12 MG/0.6ML SOLUTION: Performed by: SURGERY

## 2021-01-04 PROCEDURE — 82962 GLUCOSE BLOOD TEST: CPT

## 2021-01-04 PROCEDURE — 97530 THERAPEUTIC ACTIVITIES: CPT

## 2021-01-04 PROCEDURE — 99232 SBSQ HOSP IP/OBS MODERATE 35: CPT | Performed by: INTERNAL MEDICINE

## 2021-01-04 PROCEDURE — 25010000002 KETOROLAC TROMETHAMINE PER 15 MG: Performed by: SURGERY

## 2021-01-04 PROCEDURE — 25010000002 METOCLOPRAMIDE PER 10 MG: Performed by: SURGERY

## 2021-01-04 PROCEDURE — 94760 N-INVAS EAR/PLS OXIMETRY 1: CPT

## 2021-01-04 PROCEDURE — 80048 BASIC METABOLIC PNL TOTAL CA: CPT | Performed by: INTERNAL MEDICINE

## 2021-01-04 RX ORDER — SODIUM CHLORIDE 0.9 % (FLUSH) 0.9 %
10 SYRINGE (ML) INJECTION AS NEEDED
Status: DISCONTINUED | OUTPATIENT
Start: 2021-01-04 | End: 2021-01-13 | Stop reason: HOSPADM

## 2021-01-04 RX ORDER — SODIUM CHLORIDE 0.9 % (FLUSH) 0.9 %
10 SYRINGE (ML) INJECTION EVERY 12 HOURS SCHEDULED
Status: DISCONTINUED | OUTPATIENT
Start: 2021-01-04 | End: 2021-01-13 | Stop reason: HOSPADM

## 2021-01-04 RX ADMIN — Medication 10 ML: at 21:06

## 2021-01-04 RX ADMIN — PANTOPRAZOLE SODIUM 40 MG: 40 TABLET, DELAYED RELEASE ORAL at 04:42

## 2021-01-04 RX ADMIN — PIPERACILLIN AND TAZOBACTAM 3.38 G: 3; .375 INJECTION, POWDER, LYOPHILIZED, FOR SOLUTION INTRAVENOUS; PARENTERAL at 21:06

## 2021-01-04 RX ADMIN — METHYLNALTREXONE BROMIDE 6 MG: 12 INJECTION, SOLUTION SUBCUTANEOUS at 09:01

## 2021-01-04 RX ADMIN — RISPERIDONE 0.25 MG: 0.5 TABLET, ORALLY DISINTEGRATING ORAL at 22:33

## 2021-01-04 RX ADMIN — DOCUSATE SODIUM 100 MG: 100 CAPSULE, LIQUID FILLED ORAL at 09:01

## 2021-01-04 RX ADMIN — PIPERACILLIN AND TAZOBACTAM 3.38 G: 3; .375 INJECTION, POWDER, LYOPHILIZED, FOR SOLUTION INTRAVENOUS; PARENTERAL at 12:28

## 2021-01-04 RX ADMIN — GUAIFENESIN 600 MG: 600 TABLET, EXTENDED RELEASE ORAL at 21:07

## 2021-01-04 RX ADMIN — METOCLOPRAMIDE HYDROCHLORIDE 10 MG: 5 INJECTION INTRAMUSCULAR; INTRAVENOUS at 12:29

## 2021-01-04 RX ADMIN — DOCUSATE SODIUM 100 MG: 100 CAPSULE, LIQUID FILLED ORAL at 21:06

## 2021-01-04 RX ADMIN — AMLODIPINE BESYLATE 10 MG: 5 TABLET ORAL at 12:29

## 2021-01-04 RX ADMIN — PIPERACILLIN AND TAZOBACTAM 3.38 G: 3; .375 INJECTION, POWDER, LYOPHILIZED, FOR SOLUTION INTRAVENOUS; PARENTERAL at 04:35

## 2021-01-04 RX ADMIN — GUAIFENESIN 600 MG: 600 TABLET, EXTENDED RELEASE ORAL at 09:01

## 2021-01-04 RX ADMIN — BUDESONIDE AND FORMOTEROL FUMARATE DIHYDRATE 2 PUFF: 160; 4.5 AEROSOL RESPIRATORY (INHALATION) at 07:13

## 2021-01-04 RX ADMIN — LEVOTHYROXINE SODIUM 100 MCG: 125 TABLET ORAL at 04:42

## 2021-01-04 RX ADMIN — POLYETHYLENE GLYCOL 3350 17 G: 17 POWDER, FOR SOLUTION ORAL at 09:02

## 2021-01-04 RX ADMIN — IPRATROPIUM BROMIDE AND ALBUTEROL SULFATE 3 ML: 2.5; .5 SOLUTION RESPIRATORY (INHALATION) at 19:19

## 2021-01-04 RX ADMIN — ROSUVASTATIN CALCIUM 10 MG: 10 TABLET, FILM COATED ORAL at 21:07

## 2021-01-04 RX ADMIN — BUDESONIDE AND FORMOTEROL FUMARATE DIHYDRATE 2 PUFF: 160; 4.5 AEROSOL RESPIRATORY (INHALATION) at 19:20

## 2021-01-04 RX ADMIN — IPRATROPIUM BROMIDE AND ALBUTEROL SULFATE 3 ML: 2.5; .5 SOLUTION RESPIRATORY (INHALATION) at 07:13

## 2021-01-04 RX ADMIN — IPRATROPIUM BROMIDE AND ALBUTEROL SULFATE 3 ML: 2.5; .5 SOLUTION RESPIRATORY (INHALATION) at 03:50

## 2021-01-04 RX ADMIN — IPRATROPIUM BROMIDE AND ALBUTEROL SULFATE 3 ML: 2.5; .5 SOLUTION RESPIRATORY (INHALATION) at 23:21

## 2021-01-04 RX ADMIN — METOCLOPRAMIDE HYDROCHLORIDE 10 MG: 5 INJECTION INTRAMUSCULAR; INTRAVENOUS at 04:42

## 2021-01-04 RX ADMIN — IPRATROPIUM BROMIDE AND ALBUTEROL SULFATE 3 ML: 2.5; .5 SOLUTION RESPIRATORY (INHALATION) at 11:16

## 2021-01-04 RX ADMIN — KETOROLAC TROMETHAMINE 15 MG: 30 INJECTION, SOLUTION INTRAMUSCULAR at 04:41

## 2021-01-04 NOTE — PLAN OF CARE
Goal Outcome Evaluation:  Plan of Care Reviewed With: patient  Progress: no change   Much improved today, less anxious, sister at bedside; reaching 750-1000 on IS, sat in chair 3x. Will continue to monitor.

## 2021-01-04 NOTE — CONSULTS
Nutrition Services    Patient Name: Diane Guan  YOB: 1940  MRN: [id number]  Admission date: 12/30/2020    Comment:    Patient has been NPO/Liquid diet x 5 days, starting Boost Plus TID     PPE Documentation        PPE Worn By Provider Mask & Eye protection    PPE Worn By Patient  Patient-None, Family- Mask      CLINICAL NUTRITION ASSESSMENT      Reason for Assessment 1/4: NPO/Liquid diet x 5      H&P:      Past Medical History:   Diagnosis Date   • Acute bronchitis due to human metapneumovirus 2/8/2020   • Anxiety disorder    • Arthritis    • Breast cancer (CMS/[facility]) 02/2019    Invasive ductal carcinoma   • Chronic lymphocytic leukemia (CLL), B-cell (CMS/[facility]) 01/2019   • Depression    • Disease of thyroid gland    • Diverticulosis of colon 2018    Identified on colonoscopy   • Dysphagia 12/2020   • Dyspnea on exertion    • Hemorrhoid    • Hiatal hernia 12/2020   • Hiatal hernia with GERD     large hiatal hernia with high-grade reflux on barium swallow; s/p laparoscopic fundoplication with gastropexy   • History of diabetes mellitus     no meds now   • Hyperlipidemia    • Hypertension    • Mycobacterium avium complex (CMS/[facility]) 02/2019    aspiration pneumonia; completed abx therapy   • OAB (overactive bladder)    • Sleep apnea     daughter states pt does not have and doesn't have machine       Past Surgical History:   Procedure Laterality Date   • BLADDER SURGERY     • BRONCHOSCOPY N/A 9/13/2019    Procedure: BRONCHOSCOPY with bronchial washing;  Surgeon: Marnie Frankel MD;  Location: [facility] ENDOSCOPY;  Service: Pulmonary   • COLONOSCOPY  07/19/2018    severe diverticulosis   • CYST REMOVAL      Removed a fatty cyst off of her back   • ENDOSCOPY N/A 11/23/2020    Procedure: ESOPHAGOGASTRODUODENOSCOPY with dilatation (Bougie # 48, 50, 52, 54, 56, 58);  Surgeon: Den Plummer DO;  Location: [facility] ENDOSCOPY;  Service: General;  Laterality: N/A;  Post: large hiatal hernia, joshua's ulcers   • HIATAL  "HERNIA REPAIR N/A 12/30/2020    Procedure: Laparoscopic hiatal hernia repair with gastropexy;  Surgeon: Den Plummer DO;  Location: [facility] MAIN OR;  Service: General;  Laterality: N/A;   • MASTECTOMY Right 02/04/2019    Invasive ductal carcinoma   • TUBAL ABDOMINAL LIGATION          Current Problems:   Acute on chronic hypoxic respiratory failure, LIBBY  LLL PNA  -pulmonary following     Hiatal hernia with GERD   Possible postoperative ileus  -112/30: S/p laparoscopic hiatal hernia repair with gastropexy  -General surgery following      Hypertension     CLL  -WBC chronically elevated,(baseline 15-20 range)       Nutrition/Diet History         Narrative     1/4: At pt visit today, pt was extremely [place] & falling asleep at interview. Pt's sister at bedside & able to answer questions. She reports patients last meal was 1 day PTA. Prior to this point was eating very well & not losing weight. She thinks her sister would be open to ONS. Pt appears well nourished.      Functional Status PLOF= independent. Per PT able to ambulate with rolling walker. Anticipate DC home with HHPT.      Food Allergies NKFA      Factors Affecting   Nutritional Intake Recent surgery      Anthropometrics        Current Height, Weight Height: 157.5 cm (62\")  Weight: 83.9 kg (184 lb 15.5 oz)(no blankets on bed when new weight was taken ) (01/04/21 0506)        Admit Height, Weight -    Flowsheet Rows      First Filed Value   Admission Height  157.5 cm (62\") Documented at 12/16/2020 1444   Admission Weight  81.6 kg (180 lb) Documented at 12/16/2020 1444             Ideal Body Weight (IBW) 110 lbs    % Ideal Body Weight 167%        Usual Body Weight 180 lbs per pt's sister    % Usual Body Weight 102%    Wt Change Observation 1/4: Upon initial weight review, wt fairly stable x 5 years    Weight Hx    Wt Readings from Last 30 Encounters:   01/04/21 0506 83.9 kg (184 lb 15.5 oz)   01/03/21 0525 87.3 kg (192 lb 7.4 oz)   12/31/20 0534 83.6 kg (184 lb " 4.9 oz)   12/16/20 1444 81.6 kg (180 lb)   12/03/20 1423 85.7 kg (189 lb)   11/23/20 0646 85.6 kg (188 lb 11.4 oz)   11/16/20 1200 85.3 kg (188 lb)   11/05/20 1427 85.3 kg (188 lb)   08/18/20 0949 85.3 kg (188 lb)   05/15/20 0818 83.5 kg (184 lb)   02/14/20 1042 81.6 kg (179 lb 12.8 oz)   02/12/20 0523 83 kg (183 lb)   02/11/20 0451 83.3 kg (183 lb 9.6 oz)   02/08/20 0330 80.1 kg (176 lb 9.4 oz)   02/07/20 0347 80.7 kg (177 lb 14.6 oz)   02/06/20 1248 81.5 kg (179 lb 10.8 oz)   02/04/20 1505 83 kg (183 lb)   01/03/20 1110 83.7 kg (184 lb 9.6 oz)   09/13/19 0730 80.5 kg (177 lb 7.5 oz)   09/12/19 0908 77.1 kg (170 lb)   06/25/19 1116 75.8 kg (167 lb)   04/04/19 0857 73 kg (161 lb)   01/24/19 1502 77.8 kg (171 lb 9.6 oz)   12/27/18 1118 77.6 kg (171 lb)   07/16/18 0907 77.1 kg (170 lb)   02/01/18 0929 80.3 kg (177 lb)   01/17/18 0915 80.7 kg (178 lb)   08/07/17 1256 82.1 kg (181 lb)   04/24/17 1355 83.5 kg (184 lb)   03/27/17 1443 82.6 kg (182 lb)   01/17/17 1315 83.5 kg (184 lb)   07/18/16 1344 81.2 kg (179 lb)   05/10/16 0856 82.1 kg (181 lb)        BMI kg/m2 Body mass index is 33.83 kg/m².     Labs/Medications         Pertinent Labs -   Results from last 7 days   Lab Units 01/04/21  0316 01/03/21  0652 01/02/21  0231   SODIUM mmol/L 137 137 139   POTASSIUM mmol/L 4.0 4.4 4.6   CHLORIDE mmol/L 103 101 102   CO2 mmol/L 23.0 24.0 27.0   BUN mg/dL 23 23 19   CREATININE mg/dL 1.03* 1.02* 1.03*   CALCIUM mg/dL 9.0 9.2 9.1   GLUCOSE mg/dL 99 88 112*     Results from last 7 days   Lab Units 01/04/21  0316   HEMOGLOBIN g/dL 9.7*   HEMATOCRIT % 29.7*     COVID19   Date Value Ref Range Status   12/28/2020 Not Detected Not Detected - Ref. Range Final     Lab Results   Component Value Date    HGBA1C 6.1 (H) 08/18/2020         Pertinent Medications Colace, Synthroid, Relistor, Reglan, Protonix Zosyn, Miralax, Crestor, Toradol prn      Physical Findings        Overall Physical   Appearance, MSA 1/4: Pt visually appeared well  nourished    --  Edema  None documented      Gastrointestinal + BM x 2 on 1/3      Tubes No feeding tubes      Oral/Mouth Cavity Pt's sister denies pt has any issues w/ chewing or swallowing      Skin Abdominal surgical incision      --  Current Nutrition Orders & Evaluation of Intake       Oral Nutrition     Current PO Diet Diet Liquids; Full Liquid   Supplement None    PO Evaluation     % PO Intake 1/4: Intakes recorded reflect CLD intakes    --  Clinical Course    Nutrition Course Details PO diet    12/30-12/31 FLD  1/1-1/2 NPO  1/3 CLD  1/4 FLD      Nutritional Risk Screening        NRS-2002 Score   -       Nutrition Diagnosis         Nutrition Dx Problem 1 Inadequate oral intakes r/t hiatal hernia repair AEB NPO/Liquid diet x 5 days.       Nutrition Dx Problem 2 -       Intervention Goal         Intervention Goal(s) Diet to advance      Nutrition Intervention        RD/Tech Action Starting ONS      Nutrition Prescription          Diet Prescription FLD    Supplement Prescription Boost Plus TID    --  Monitor/Evaluation        Monitor Weights, intakes, labs, BM, skin and medication changes      Electronically signed by:  Estefania Barba RD  01/04/21 14:47 EST

## 2021-01-04 NOTE — PROGRESS NOTES
LOS: 4 days   Patient Care Team:  Jemima Fontaine MD as PCP - General (Family Medicine)    Reason for follow-up: Postop    Subjective   Patient seen and examined.  Feeling better.  Breathing better    Objective   Incisions are clean dry and intact without infection    Vital Signs  Vitals:    01/04/21 0713 01/04/21 0718 01/04/21 1116 01/04/21 1118   BP:       BP Location:       Patient Position:       Pulse: 75 78 79 81   Resp: 18 18 18    Temp:       TempSrc:       SpO2: 97% 98% 94% 93%   Weight:       Height:             Results Review:       Lab Results (last 24 hours)     Procedure Component Value Units Date/Time    POC Glucose Once [[id number]]  (Abnormal) Collected: 01/04/21 1136    Specimen: Blood Updated: 01/04/21 1148     Glucose 136 mg/dL      Comment: Serial Number: [id number]:  [id number]       CBC & Differential [[id number]]  (Abnormal) Collected: 01/04/21 0316    Specimen: Blood Updated: 01/04/21 0608    Narrative:      The following orders were created for panel order CBC & Differential.  Procedure                               Abnormality         Status                     ---------                               -----------         ------                     Scan Slide[[id number]]                                       Final result               CBC Auto Differential[[id number]]        Abnormal            Final result                 Please view results for these tests on the individual orders.    CBC Auto Differential [[id number]]  (Abnormal) Collected: 01/04/21 0316    Specimen: Blood Updated: 01/04/21 0608     WBC 10.00 10*3/mm3      RBC 3.53 10*6/mm3      Hemoglobin 9.7 g/dL      Hematocrit 29.7 %      MCV 84.1 fL      MCH 27.4 pg      MCHC 32.6 g/dL      RDW 14.9 %      RDW-SD 43.8 fl      MPV 9.9 fL      Platelets 150 10*3/mm3     Narrative:      The previously reported component NRBC is no longer being reported. Previous result was 0.1 /100 WBC (Reference Range: 0.0-0.2 /100 WBC) on  1/4/2021 at 0438 EST.    Scan Slide [[id number]] Collected: 01/04/21 0316    Specimen: Blood Updated: 01/04/21 0608     Scan Slide --     Comment: See Manual Differential Results       Manual Differential [[id number]]  (Abnormal) Collected: 01/04/21 0316    Specimen: Blood Updated: 01/04/21 0608     Neutrophil % 41.0 %      Lymphocyte % 53.0 %      Monocyte % 2.0 %      Eosinophil % 4.0 %      Neutrophils Absolute 4.10 10*3/mm3      Lymphocytes Absolute 5.30 10*3/mm3      Monocytes Absolute 0.20 10*3/mm3      Eosinophils Absolute 0.40 10*3/mm3      RBC Morphology Normal     WBC Morphology Normal     Platelet Morphology Normal    Narrative:      Reviewed by Pathologist within the past 30 days on 12.31.2020.      Basic Metabolic Panel [[id number]]  (Abnormal) Collected: 01/04/21 0316    Specimen: Blood Updated: 01/04/21 0522     Glucose 99 mg/dL      BUN 23 mg/dL      Creatinine 1.03 mg/dL      Sodium 137 mmol/L      Potassium 4.0 mmol/L      Chloride 103 mmol/L      CO2 23.0 mmol/L      Calcium 9.0 mg/dL      eGFR Non African Amer 52 mL/min/1.73      BUN/Creatinine Ratio 22.3     Anion Gap 11.0 mmol/L     Narrative:      GFR Normal >60  Chronic Kidney Disease <60  Kidney Failure <15             Imaging Results (Last 24 Hours)     ** No results found for the last 24 hours. **          Medication Review:   Current Facility-Administered Medications:   •  acetaminophen (TYLENOL) tablet 650 mg, 650 mg, Oral, Q4H PRN, 650 mg at 01/03/21 1338 **OR** acetaminophen (TYLENOL) suppository 650 mg, 650 mg, Rectal, Q4H PRN, Den Plummer DO  •  amLODIPine (NORVASC) tablet 10 mg, 10 mg, Oral, Daily With Lunch, Bob Lewis DO, 10 mg at 01/03/21 1338  •  budesonide-formoterol (SYMBICORT) 160-4.5 MCG/ACT inhaler 2 puff, 2 puff, Inhalation, BID - RT, Bob Lewis DO, 2 puff at 01/04/21 0713  •  docusate sodium (COLACE) capsule 100 mg, 100 mg, Oral, BID, Bob Lewis, DO, 100 mg at 01/04/21 0901  •  guaiFENesin (MUCINEX) 12 hr  tablet 600 mg, 600 mg, Oral, Q12H, Bob Lewis, , 600 mg at 01/04/21 0901  •  influenza vac split quad (FLUZONE,FLUARIX,AFLURIA,FLULAVAL) injection 0.5 mL, 0.5 mL, Intramuscular, During Hospitalization, Bob Lewis DO  •  ipratropium-albuterol (DUO-NEB) nebulizer solution 3 mL, 3 mL, Nebulization, Q4H - RT, Bob Lewis DO, 3 mL at 01/04/21 1116  •  ketorolac (TORADOL) injection 15 mg, 15 mg, Intravenous, Q6H PRN, Shanna Goins MD, 15 mg at 01/04/21 0441  •  lactated ringers infusion, 9 mL/hr, Intravenous, Continuous PRN, Den Plummer DO, Stopped at 12/30/20 2009  •  levothyroxine (SYNTHROID, LEVOTHROID) tablet 100 mcg, 100 mcg, Oral, QAM AC, Bob Lewis DO, 100 mcg at 01/04/21 0442  •  methylnaltrexone (RELISTOR) injection 6 mg, 6 mg, Subcutaneous, Every Other Day, Shanna Goins MD, 6 mg at 01/04/21 0901  •  metoclopramide (REGLAN) injection 10 mg, 10 mg, Intravenous, Q6H, Shanna Goins MD, 10 mg at 01/04/21 0442  •  Morphine sulfate (PF) injection 2 mg, 2 mg, Intravenous, Q4H PRN, Bob Lewis DO, 2 mg at 01/02/21 1328  •  [DISCONTINUED] HYDROmorphone (DILAUDID) injection 0.5 mg, 0.5 mg, Intravenous, Q2H PRN, 0.5 mg at 12/31/20 7343 **AND** naloxone (NARCAN) injection 0.1 mg, 0.1 mg, Intravenous, Q5 Min PRN, Den Plummer, DO  •  [DISCONTINUED] Morphine sulfate (PF) injection 4 mg, 4 mg, Intravenous, Q2H PRN **AND** naloxone (NARCAN) injection 0.4 mg, 0.4 mg, Intravenous, Q5 Min PRN, Den Plummer, DO  •  ondansetron (ZOFRAN) tablet 4 mg, 4 mg, Oral, Q6H PRN **OR** ondansetron (ZOFRAN) injection 4 mg, 4 mg, Intravenous, Q6H PRN, Den Plummer, DO  •  oxyCODONE (ROXICODONE) immediate release tablet 5 mg, 5 mg, Oral, Q8H PRN, Shanna Goins MD  •  pantoprazole (PROTONIX) EC tablet 40 mg, 40 mg, Oral, Q AM, Den Plummer, DO, 40 mg at 01/04/21 0442  •  phenylephrine (AUDRA-SYNEPHRINE) 50 mg in 250 mL NS infusion, 0.5-3 mcg/kg/min, Intravenous, Continuous PRN, Den Plummer, DO  •   piperacillin-tazobactam (ZOSYN) IVPB 3.375 g in 100 mL NS (CD), 3.375 g, Intravenous, Q8H, Bob Lewis, DO, 3.375 g at 01/04/21 0435  •  polyethylene glycol (MIRALAX) packet 17 g, 17 g, Oral, Daily, Shanna Goins MD, 17 g at 01/04/21 0902  •  promethazine (PHENERGAN) tablet 12.5 mg, 12.5 mg, Oral, Q6H PRN **OR** promethazine (PHENERGAN) suppository 12.5 mg, 12.5 mg, Rectal, Q6H PRN, Den Plummer,   •  risperiDONE (risperDAL M-TABS) disintegrating tablet 0.25 mg, 0.25 mg, Oral, BID PRN, Bob Lewis,   •  rosuvastatin (CRESTOR) tablet 10 mg, 10 mg, Oral, Nightly, Bob Lewis, , 10 mg at 01/03/21 2029  •  saline (AYR) nasal gel, , Topical, PRN, Bob Lewis,     Assessment/Plan         Hiatal hernia with gastroesophageal reflux    LIBBY (obstructive sleep apnea)    Nocturnal hypoxia    Acute respiratory failure with hypoxia and hypercapnia (CMS/HCC)    Abdominal pain    Impression: Postop day 5 lap hiatal hernia repair with gastropexy for giant hiatal hernia with pulmonary compromise    Plan: Surgically doing well.  Will defer to medicine concerning projected discharge time          Den Plummer DO  01/04/21  12:09 EST

## 2021-01-04 NOTE — CONSULTS
powerglide midline placed in left upper arm under u/s guidance by les fermin rn. Flushes easily and blood return noted, francisco macario notified ok to use.

## 2021-01-04 NOTE — PLAN OF CARE
O2 lowered by RT and pt tolerating well. Family at bedside. PT walked pt around room. Pt stable during shift. Will cont to monitor.       Problem: Adult Inpatient Plan of Care  Goal: Plan of Care Review  Outcome: Ongoing, Progressing  Goal: Patient-Specific Goal (Individualized)  Outcome: Ongoing, Progressing  Goal: Absence of Hospital-Acquired Illness or Injury  Outcome: Ongoing, Progressing  Intervention: Identify and Manage Fall Risk  Recent Flowsheet Documentation  Taken 1/4/2021 1805 by Isabella Padilla RN  Safety Promotion/Fall Prevention:   safety round/check completed   room organization consistent   clutter free environment maintained  Taken 1/4/2021 1610 by Isabella Padilla RN  Safety Promotion/Fall Prevention:   safety round/check completed   room organization consistent   clutter free environment maintained   assistive device/personal items within reach  Taken 1/4/2021 1411 by Isabella Padilla RN  Safety Promotion/Fall Prevention:   safety round/check completed   room organization consistent   clutter free environment maintained  Taken 1/4/2021 1400 by Isabella Padilla RN  Safety Promotion/Fall Prevention:   safety round/check completed   room organization consistent   clutter free environment maintained  Taken 1/4/2021 1200 by Isabella Padilla RN  Safety Promotion/Fall Prevention:   safety round/check completed   room organization consistent   assistive device/personal items within reach   clutter free environment maintained  Taken 1/4/2021 1008 by Isabella Padilla RN  Safety Promotion/Fall Prevention:   safety round/check completed   room organization consistent   clutter free environment maintained  Taken 1/4/2021 0800 by Isabella Padilla RN  Safety Promotion/Fall Prevention:   activity supervised   clutter free environment maintained   safety round/check completed   room organization consistent  Intervention: Prevent Skin Injury  Recent Flowsheet Documentation  Taken 1/4/2021 1610 by Isabella Padilla  RN  Body Position: position changed independently  Taken 1/4/2021 0800 by Isabella Padilla RN  Body Position: dangle, side of bed  Goal: Optimal Comfort and Wellbeing  Outcome: Ongoing, Progressing  Intervention: Provide Person-Centered Care  Recent Flowsheet Documentation  Taken 1/4/2021 0800 by Isabella Padilla RN  Trust Relationship/Rapport: care explained  Goal: Readiness for Transition of Care  Outcome: Ongoing, Progressing     Problem: Pain Acute  Goal: Optimal Pain Control  Outcome: Ongoing, Progressing  Intervention: Optimize Psychosocial Wellbeing  Recent Flowsheet Documentation  Taken 1/4/2021 0800 by Isabella Padilla RN  Diversional Activities: television     Problem: Bleeding (Surgery Nonspecified)  Goal: Absence of Bleeding  Outcome: Ongoing, Progressing  Intervention: Monitor and Manage Bleeding  Recent Flowsheet Documentation  Taken 1/4/2021 0800 by Isabella Padilla RN  Bleeding Management: dressing monitored     Problem: Bowel Elimination Impaired (Surgery Nonspecified)  Goal: Effective Bowel Elimination  Outcome: Ongoing, Progressing     Problem: Infection (Surgery Nonspecified)  Goal: Absence of Infection Signs and Symptoms  Outcome: Ongoing, Progressing     Problem: Ongoing Anesthesia Effects (Surgery Nonspecified)  Goal: Anesthesia/Sedation Recovery  Outcome: Ongoing, Progressing  Intervention: Optimize Anesthesia Recovery  Recent Flowsheet Documentation  Taken 1/4/2021 1805 by Isabella Padilla RN  Safety Promotion/Fall Prevention:   safety round/check completed   room organization consistent   clutter free environment maintained  Taken 1/4/2021 1610 by Isabella Padilla RN  $ Incentive Spirometry: yes  Patient Tolerance (IS): good  Safety Promotion/Fall Prevention:   safety round/check completed   room organization consistent   clutter free environment maintained   assistive device/personal items within reach  Administration (IS):   instruction provided, follow-up   proper technique demonstrated    self-administered  Level Incentive Spirometer (mL): 750  Number of Repetitions (IS): 10  Taken 1/4/2021 1411 by Isabella Padilla RN  Safety Promotion/Fall Prevention:   safety round/check completed   room organization consistent   clutter free environment maintained  Taken 1/4/2021 1400 by Isabella Padilla RN  Safety Promotion/Fall Prevention:   safety round/check completed   room organization consistent   clutter free environment maintained  Taken 1/4/2021 1200 by Isabella Padilla RN  Safety Promotion/Fall Prevention:   safety round/check completed   room organization consistent   assistive device/personal items within reach   clutter free environment maintained  Taken 1/4/2021 1008 by Isabella Padilla RN  Safety Promotion/Fall Prevention:   safety round/check completed   room organization consistent   clutter free environment maintained  Taken 1/4/2021 0800 by Isabella Padilla RN  $ Incentive Spirometry: yes  Safety Promotion/Fall Prevention:   activity supervised   clutter free environment maintained   safety round/check completed   room organization consistent  Administration (IS): instruction provided, follow-up     Problem: Pain (Surgery Nonspecified)  Goal: Acceptable Pain Control  Outcome: Ongoing, Progressing  Intervention: Prevent or Manage Pain  Recent Flowsheet Documentation  Taken 1/4/2021 0800 by Isabella Padilla RN  Diversional Activities: television     Problem: Postoperative Nausea and Vomiting (Surgery Nonspecified)  Goal: Nausea and Vomiting Relief  Outcome: Ongoing, Progressing     Problem: Postoperative Urinary Retention (Surgery Nonspecified)  Goal: Effective Urinary Elimination  Outcome: Ongoing, Progressing     Problem: Fall Injury Risk  Goal: Absence of Fall and Fall-Related Injury  Outcome: Ongoing, Progressing  Intervention: Identify and Manage Contributors to Fall Injury Risk  Recent Flowsheet Documentation  Taken 1/4/2021 0800 by Isabella Padilla RN  Medication Review/Management: medications  reviewed  Intervention: Promote Injury-Free Environment  Recent Flowsheet Documentation  Taken 1/4/2021 1805 by Isabella Padilla RN  Safety Promotion/Fall Prevention:   safety round/check completed   room organization consistent   clutter free environment maintained  Taken 1/4/2021 1610 by Isabella Padilla RN  Safety Promotion/Fall Prevention:   safety round/check completed   room organization consistent   clutter free environment maintained   assistive device/personal items within reach  Taken 1/4/2021 1411 by Isabella Padilla RN  Safety Promotion/Fall Prevention:   safety round/check completed   room organization consistent   clutter free environment maintained  Taken 1/4/2021 1400 by Isabella Padilla RN  Safety Promotion/Fall Prevention:   safety round/check completed   room organization consistent   clutter free environment maintained  Taken 1/4/2021 1200 by Isabella Padilla RN  Safety Promotion/Fall Prevention:   safety round/check completed   room organization consistent   assistive device/personal items within reach   clutter free environment maintained  Taken 1/4/2021 1008 by Isabella Padilla RN  Safety Promotion/Fall Prevention:   safety round/check completed   room organization consistent   clutter free environment maintained  Taken 1/4/2021 0800 by Isabella Padilla RN  Safety Promotion/Fall Prevention:   activity supervised   clutter free environment maintained   safety round/check completed   room organization consistent     Problem: Skin Injury Risk Increased  Goal: Skin Health and Integrity  Outcome: Ongoing, Progressing  Intervention: Optimize Skin Protection  Recent Flowsheet Documentation  Taken 1/4/2021 1610 by Isabella Padilla RN  Head of Bed (HOB): HOB at 20 degrees  Taken 1/4/2021 0800 by Isabella Padilla RN  Head of Bed (HOB): HOB at 20-30 degrees   Goal Outcome Evaluation:  Plan of Care Reviewed With: patient  Progress: no change

## 2021-01-04 NOTE — PROGRESS NOTES
Continued Stay Note   [facility]     Patient Name: Diane Guan  MRN: [id number]  Today's Date: 1/4/2021    Admit Date: 12/30/2020    Discharge Plan     Row Name 01/04/21 1228       Plan    Plan  D/C Plan : Home with spouse . PT is recommending H/H and pt is refusing at this time . Pt has home o2 .    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       Expected Discharge Date and Time     Expected Discharge Date Expected Discharge Time    Jan 7, 2021             Breana Sterling RN

## 2021-01-04 NOTE — PROGRESS NOTES
[facility] Daily Progress Note      Hospitalist Team  LOS 4 days      Patient Care Team:  Jemima Fontaine MD as PCP - General (Family Medicine)    Patient Location: 2105/1      Subjective   Subjective     Chief Complaint / Subjective  No chief complaint on file.  follow up shortness of breath       Brief Synopsis of Hospital Course/HPI     80-year-old female who was having issues with GERD was found to have hiatal hernia and is underwent fixation per general surgery.  She has been admitted postoperatively medicine has been consulted to help with her medical problems.  Her blood pressure stable she is having some apnea spells from the sedation but overall her breathing is stable.  No other issues.  As reported she is slightly sedated from the procedure and HPI was slightly limited.         Date::    12/31/20: had worsening respiratory status last night and has been on HFNC this am. Refusing to wear BIPAP although likely needs it fo LIBBY.    1/1/20: on BIPAP this am and thus HPI limited. Events of overnight noted was moved to higher level of care. O2 is stable today. Seems O2 worse when not deep breathing and when sleeping as has LIBBY.     1/2/21: no real improvement. Having issues with pain medication and sedation not allowing pulmonary toilet. Will not wear BIPAP    1/3/21: Slight improvement today.  Per the nurse she had been using her incentive spirometer and been more ambulatory.  Encourage patient with pulmonology at bedside to continue to increase activity    1/4/2021: Patient seen and examined this morning.  Remains short of breath and on high flow.  Encouraged to ambulate more and use examination amatory, patient agrees.  No other complaints or acute events overnight per nursing.    Denies fever, chills, chest pain, nausea, vomiting, diarrhea, dysuria, or dizziness.    Review of Systems   All other systems reviewed and are negative.        Objective   Objective   "    Vital Signs  Temp:  [97.8 °F (36.6 °C)-98 °F (36.7 °C)] 98 °F (36.7 °C)  Heart Rate:  [] 81  Resp:  [16-22] 18  BP: (140-155)/(51-64) 150/64  Oxygen Therapy  SpO2: 93 %  Pulse Oximetry Type: Continuous  Device (Oxygen Therapy): heated, high-flow nasal cannula  $ High Flow Nasal Cannula Set-Up: yes  Flow (L/min): 40  Oxygen Concentration (%): 95  Flowsheet Rows      First Filed Value   Admission Height  157.5 cm (62\") Documented at 12/16/2020 1444   Admission Weight  81.6 kg (180 lb) Documented at 12/16/2020 1444        Intake & Output (last 3 days)       01/01 0701 - 01/02 0700 01/02 0701 - 01/03 0700 01/03 0701 - 01/04 0700 01/04 0701 - 01/05 0700    P.O.   240 240    I.V. (mL/kg)  593 (6.8)      IV Piggyback  100      Total Intake(mL/kg)  693 (7.9) 240 (2.9) 240 (2.9)    Urine (mL/kg/hr) 1650 (0.8) 550 (0.3) 100 (0)     Total Output 1650 550 100     Net -1650 +143 +140 +240            Urine Unmeasured Occurrence   3 x     Stool Unmeasured Occurrence   2 x         Lines, Drains & Airways    Active LDAs     Name:   Placement date:   Placement time:   Site:   Days:    Peripheral IV 12/31/20 0938 Anterior;Left Forearm   12/31/20    0938    Forearm   less than 1                  Physical Exam:    General: Awake, alert, elderly female, NAD  Eyes: PERRL, EOMI, conjunctive are clear  Cardiovascular: Regular rate and rhythm, no murmurs  Respiratory: Clear to auscultation bilaterally, no wheezing or rales, unlabored breathing  Abdomen: Soft, tender around incision sites, positive bowel sounds, no guarding  Neurologic: A&O, CN grossly intact, moves all extremities spontaneously  Musculoskeletal: Normal range of motion, no deformities  Skin: Warm, dry, intact         Wounds (last 24 hours)      LDA Wound     Row Name 01/04/21 0800 01/03/21 2028 01/03/21 1600       Wound 12/30/20 1037 abdomen Incision    Wound - Properties Group Placement Date: 12/30/20  -AM Placement Time: 1037  -AM Location: abdomen  -AM Primary " Wound Type: Incision  -AM    Dressing Appearance  open to air  -HV  --  --    Closure  BRIANNE  -HV  --  --    Base  --  dressing in place, unable to visualize  -SE  dressing in place, unable to visualize  -RG    Periwound  intact;dry  -HV  intact;dry  -SE  intact;dry  -RG    Retired Wound - Properties Group Date first assessed: 12/30/20  -AM Time first assessed: 1037  -AM Location: abdomen  -AM Primary Wound Type: Incision  -AM      User Key  (r) = Recorded By, (t) = Taken By, (c) = Cosigned By    Initials Name Provider Type    AM Ping Martinez, RN Registered Nurse    Tessie Payton, RN Registered Nurse    Roshni Hanna, RN Registered Nurse    Isabella Mariee, RN Registered Nurse          Procedures:    Procedure(s):  Laparoscopic hiatal hernia repair with gastropexy          Results Review:     I reviewed the patient's new clinical results.      Lab Results (last 24 hours)     Procedure Component Value Units Date/Time    POC Glucose Once [[id number]]  (Abnormal) Collected: 01/04/21 1136    Specimen: Blood Updated: 01/04/21 1148     Glucose 136 mg/dL      Comment: Serial Number: [id number]:  [id number]       CBC & Differential [[id number]]  (Abnormal) Collected: 01/04/21 0316    Specimen: Blood Updated: 01/04/21 0608    Narrative:      The following orders were created for panel order CBC & Differential.  Procedure                               Abnormality         Status                     ---------                               -----------         ------                     Scan Slide[[id number]]                                       Final result               CBC Auto Differential[[id number]]        Abnormal            Final result                 Please view results for these tests on the individual orders.    CBC Auto Differential [[id number]]  (Abnormal) Collected: 01/04/21 0316    Specimen: Blood Updated: 01/04/21 0608     WBC 10.00 10*3/mm3      RBC 3.53 10*6/mm3      Hemoglobin 9.7 g/dL       Hematocrit 29.7 %      MCV 84.1 fL      MCH 27.4 pg      MCHC 32.6 g/dL      RDW 14.9 %      RDW-SD 43.8 fl      MPV 9.9 fL      Platelets 150 10*3/mm3     Narrative:      The previously reported component NRBC is no longer being reported. Previous result was 0.1 /100 WBC (Reference Range: 0.0-0.2 /100 WBC) on 1/4/2021 at 0438 EST.    Scan Slide [[id number]] Collected: 01/04/21 0316    Specimen: Blood Updated: 01/04/21 0608     Scan Slide --     Comment: See Manual Differential Results       Manual Differential [[id number]]  (Abnormal) Collected: 01/04/21 0316    Specimen: Blood Updated: 01/04/21 0608     Neutrophil % 41.0 %      Lymphocyte % 53.0 %      Monocyte % 2.0 %      Eosinophil % 4.0 %      Neutrophils Absolute 4.10 10*3/mm3      Lymphocytes Absolute 5.30 10*3/mm3      Monocytes Absolute 0.20 10*3/mm3      Eosinophils Absolute 0.40 10*3/mm3      RBC Morphology Normal     WBC Morphology Normal     Platelet Morphology Normal    Narrative:      Reviewed by Pathologist within the past 30 days on 12.31.2020.      Basic Metabolic Panel [[id number]]  (Abnormal) Collected: 01/04/21 0316    Specimen: Blood Updated: 01/04/21 0522     Glucose 99 mg/dL      BUN 23 mg/dL      Creatinine 1.03 mg/dL      Sodium 137 mmol/L      Potassium 4.0 mmol/L      Chloride 103 mmol/L      CO2 23.0 mmol/L      Calcium 9.0 mg/dL      eGFR Non African Amer 52 mL/min/1.73      BUN/Creatinine Ratio 22.3     Anion Gap 11.0 mmol/L     Narrative:      GFR Normal >60  Chronic Kidney Disease <60  Kidney Failure <15          No results found for: HGBA1C        Results from last 7 days   Lab Units 01/02/21  1711   PH, ARTERIAL pH units 7.353   PO2 ART mm Hg 72.0*   PCO2, ARTERIAL mm Hg 48.2*   HCO3 ART mmol/L 26.8     No results found for: LIPASE  Lab Results   Component Value Date    CHOL 374 (H) 01/03/2020    TRIG 311 (H) 01/03/2020    HDL 50 01/03/2020     (H) 01/03/2020       Lab Results   Lab Value Date/Time    FINALDX  12/31/2020 0205      Leukocytosis with absolute atypical lymphocytosis  Anemia  No blasts identified  If CBC indices persist/worsen, consider flow cytometry or other studies as clinically indicated to exclude a lymphoproliferative disorder      FINALDX  02/06/2020 1405     Absolute atypical lymphocytosis.  Anemia.  No blasts identified.  Recommend clinical follow-up and additional studies to include flow cytometry as clinically indicated to exclude CLL/SLL or other lymphoproliferative disorder.         Microbiology Results (last 10 days)     Procedure Component Value - Date/Time    COVID-19,APTIMA PANTHER,[facility] IN-HOUSE, NP/OP SWAB IN UTM/VTM/SALINE TRANSPORT MEDIA,24 HR TAT - Swab, Nasopharynx [[id number]]  (Normal) Collected: 12/28/20 1227    Lab Status: Final result Specimen: Swab from Nasopharynx Updated: 12/28/20 2046     COVID19 Not Detected    Narrative:      Fact sheet for providers: https://www.fda.gov/media/463938/download     Fact sheet for patients: https://www.fda.gov/media/014770/download    Test performed by PCR.          ECG/EMG Results (most recent)     Procedure Component Value Units Date/Time    ECG 12 Lead [[id number]] Collected: 12/30/20 1314     Updated: 01/02/21 1511     QT Interval 421 ms     Narrative:      HEART RATE= 71  bpm  RR Interval= 844  ms  OR Interval= 172  ms  P Horizontal Axis= -9  deg  P Front Axis= 48  deg  QRSD Interval= 101  ms  QT Interval= 421  ms  QRS Axis= 41  deg  T Wave Axis= 77  deg  - ABNORMAL ECG -  Sinus rhythm With old anterior septal MI  Low voltage, precordial leads  When compared with ECG of 18-Dec-2020 14:29:46,  Poor R wave progression V1 to V3 noted  Electronically Signed By: Fausto Jean (PAVAN) 02-Jan-2021 15:05:56  Date and Time of Study: 2020-12-30 13:14:52                    Xr Chest 1 View    Result Date: 1/2/2021    1.  Low volume inspiration with persistent left lower lobe airspace consolidation which is unchanged. 2.  Small bilateral pleural effusions.    Electronically Signed By-Jan Vernon MD On:1/2/2021 11:39 AM This report was finalized on [id number] by  Jan Vernon MD.    Xr Chest 1 View    Result Date: 12/31/2020   1. Consolidation in the left lung base which appears new. 2. Lower lung volumes with right basilar atelectasis. 3. Cardiomegaly without acute cardiac decompensation. 4. Right basilar atelectasis.  Electronically Signed By-Den Connell MD On:12/31/2020 9:10 AM This report was finalized on [id number] by  Den Connell MD.          Xrays, labs reviewed personally by physician.    Medication Review:   I have reviewed the patient's current medication list      Scheduled Meds  amLODIPine, 10 mg, Oral, Daily With Lunch  budesonide-formoterol, 2 puff, Inhalation, BID - RT  docusate sodium, 100 mg, Oral, BID  guaiFENesin, 600 mg, Oral, Q12H  ipratropium-albuterol, 3 mL, Nebulization, Q4H - RT  levothyroxine, 100 mcg, Oral, QAM AC  methylnaltrexone, 6 mg, Subcutaneous, Every Other Day  metoclopramide, 10 mg, Intravenous, Q6H  pantoprazole, 40 mg, Oral, Q AM  piperacillin-tazobactam, 3.375 g, Intravenous, Q8H  polyethylene glycol, 17 g, Oral, Daily  rosuvastatin, 10 mg, Oral, Nightly        Meds Infusions  lactated ringers, 9 mL/hr, Last Rate: Stopped (12/30/20 2009)  phenylephrine, 0.5-3 mcg/kg/min        Meds PRN  •  acetaminophen **OR** acetaminophen  •  influenza vaccine  •  ketorolac  •  lactated ringers  •  Morphine  •  [DISCONTINUED] HYDROmorphone **AND** naloxone  •  [DISCONTINUED] Morphine **AND** naloxone  •  ondansetron **OR** ondansetron  •  oxyCODONE  •  phenylephrine  •  promethazine **OR** promethazine  •  risperiDONE  •  saline        Assessment/Plan   Assessment/Plan     Active Hospital Problems    Diagnosis  POA   • **Hiatal hernia with gastroesophageal reflux [K21.9, K44.9]  Unknown   • Acute respiratory failure with hypoxia and hypercapnia (CMS/HCC) [J96.01, J96.02]  Unknown   • Abdominal pain [R10.9]  Unknown   • Nocturnal  hypoxia [G47.34]  Yes   • LIBBY (obstructive sleep apnea) [G47.33]  Unknown      Resolved Hospital Problems   No resolved problems to display.       MEDICAL DECISION MAKING COMPLEXITY BY PROBLEM:     Acute on chronic hypoxic respiratory failure, LIBBY  LLL PNA  -likely realted to narcotics and LIBBY vs LLL CAP  -CXR 12/31/20: new LLL infiltrate  -CT chest reviewed b/l LL atelectasis vs infiltrate  -Remains on high flow, wean off as tolerated  -has been refusing AVAP, encourage use   -Nebulizers, symbicort  -c/w Zosyn  -encourage aggressive pulmonary toilet  -pulmonary following    Hiatal hernia with GERD   Possible postoperative ileus  -S/p laparoscopic hiatal hernia repair with gastropexy  -General surgery primary postop per them  -PPI, carafate, reglan prn  -bowel regimen per surgery      Hypertension  -Continue home meds  -Monitor and adjust blood pressure prn    CLL  -WBC chronically elevated,(baseline 15-20 range)  -Follow CBC while here    DVT prophylaxis  -Per primary      VTE Prophylaxis -   Mechanical Order History:      Ordered        12/30/20 1513  Place Sequential Compression Device  Once         12/30/20 1513  Place Sequential Compression Device  Once         12/30/20 1513  Maintain Sequential Compression Device  Continuous                 Pharmalogical Order History:     None            Code Status -   Code Status and Medical Interventions:   Ordered at: 12/30/20 1513     Code Status:    CPR     Medical Interventions (Level of Support Prior to Arrest):    Full       This patient has been examined wearing appropriate Personal Protective Equipment. 01/04/21        Discharge Planning  Respiratory status will have to improve before d/c         Electronically signed by Meg Lund DO, 01/04/21, 13:29 EST.  [facility] Hospitalist Team

## 2021-01-04 NOTE — PLAN OF CARE
Goal Outcome Evaluation:  Plan of Care Reviewed With: patient  Progress: no change   VSS. Complained of pain around 0430 and was given medication. Pain was in her lower back. Appears restless. Still on PF at 40/100. Sister at bedside

## 2021-01-05 ENCOUNTER — APPOINTMENT (OUTPATIENT)
Dept: GENERAL RADIOLOGY | Facility: HOSPITAL | Age: 81
End: 2021-01-05

## 2021-01-05 PROCEDURE — 25010000002 KETOROLAC TROMETHAMINE PER 15 MG: Performed by: SURGERY

## 2021-01-05 PROCEDURE — 99232 SBSQ HOSP IP/OBS MODERATE 35: CPT | Performed by: INTERNAL MEDICINE

## 2021-01-05 PROCEDURE — 25010000002 PIPERACILLIN SOD-TAZOBACTAM PER 1 G: Performed by: INTERNAL MEDICINE

## 2021-01-05 PROCEDURE — 97530 THERAPEUTIC ACTIVITIES: CPT

## 2021-01-05 PROCEDURE — 25010000002 METOCLOPRAMIDE PER 10 MG: Performed by: SURGERY

## 2021-01-05 PROCEDURE — 94799 UNLISTED PULMONARY SVC/PX: CPT

## 2021-01-05 PROCEDURE — 97116 GAIT TRAINING THERAPY: CPT

## 2021-01-05 PROCEDURE — 71045 X-RAY EXAM CHEST 1 VIEW: CPT

## 2021-01-05 RX ADMIN — IPRATROPIUM BROMIDE AND ALBUTEROL SULFATE 3 ML: 2.5; .5 SOLUTION RESPIRATORY (INHALATION) at 07:07

## 2021-01-05 RX ADMIN — IPRATROPIUM BROMIDE AND ALBUTEROL SULFATE 3 ML: 2.5; .5 SOLUTION RESPIRATORY (INHALATION) at 19:21

## 2021-01-05 RX ADMIN — METOCLOPRAMIDE HYDROCHLORIDE 10 MG: 5 INJECTION INTRAMUSCULAR; INTRAVENOUS at 05:36

## 2021-01-05 RX ADMIN — Medication 10 ML: at 23:54

## 2021-01-05 RX ADMIN — IPRATROPIUM BROMIDE AND ALBUTEROL SULFATE 3 ML: 2.5; .5 SOLUTION RESPIRATORY (INHALATION) at 11:21

## 2021-01-05 RX ADMIN — IPRATROPIUM BROMIDE AND ALBUTEROL SULFATE 3 ML: 2.5; .5 SOLUTION RESPIRATORY (INHALATION) at 23:55

## 2021-01-05 RX ADMIN — DOCUSATE SODIUM 100 MG: 100 CAPSULE, LIQUID FILLED ORAL at 20:43

## 2021-01-05 RX ADMIN — DOCUSATE SODIUM 100 MG: 100 CAPSULE, LIQUID FILLED ORAL at 08:43

## 2021-01-05 RX ADMIN — PANTOPRAZOLE SODIUM 40 MG: 40 TABLET, DELAYED RELEASE ORAL at 05:36

## 2021-01-05 RX ADMIN — RISPERIDONE 0.25 MG: 0.5 TABLET, ORALLY DISINTEGRATING ORAL at 20:43

## 2021-01-05 RX ADMIN — Medication 10 ML: at 05:36

## 2021-01-05 RX ADMIN — METOCLOPRAMIDE HYDROCHLORIDE 10 MG: 5 INJECTION INTRAMUSCULAR; INTRAVENOUS at 23:53

## 2021-01-05 RX ADMIN — METOCLOPRAMIDE HYDROCHLORIDE 10 MG: 5 INJECTION INTRAMUSCULAR; INTRAVENOUS at 13:09

## 2021-01-05 RX ADMIN — IPRATROPIUM BROMIDE AND ALBUTEROL SULFATE 3 ML: 2.5; .5 SOLUTION RESPIRATORY (INHALATION) at 14:46

## 2021-01-05 RX ADMIN — BUDESONIDE AND FORMOTEROL FUMARATE DIHYDRATE 2 PUFF: 160; 4.5 AEROSOL RESPIRATORY (INHALATION) at 07:07

## 2021-01-05 RX ADMIN — KETOROLAC TROMETHAMINE 15 MG: 30 INJECTION, SOLUTION INTRAMUSCULAR at 23:53

## 2021-01-05 RX ADMIN — RISPERIDONE 0.25 MG: 0.5 TABLET, ORALLY DISINTEGRATING ORAL at 08:44

## 2021-01-05 RX ADMIN — Medication 10 ML: at 08:44

## 2021-01-05 RX ADMIN — BUDESONIDE AND FORMOTEROL FUMARATE DIHYDRATE 2 PUFF: 160; 4.5 AEROSOL RESPIRATORY (INHALATION) at 19:21

## 2021-01-05 RX ADMIN — GUAIFENESIN 600 MG: 600 TABLET, EXTENDED RELEASE ORAL at 08:43

## 2021-01-05 RX ADMIN — PIPERACILLIN AND TAZOBACTAM 3.38 G: 3; .375 INJECTION, POWDER, LYOPHILIZED, FOR SOLUTION INTRAVENOUS; PARENTERAL at 05:35

## 2021-01-05 RX ADMIN — AMLODIPINE BESYLATE 10 MG: 5 TABLET ORAL at 13:09

## 2021-01-05 RX ADMIN — LEVOTHYROXINE SODIUM 100 MCG: 125 TABLET ORAL at 08:43

## 2021-01-05 RX ADMIN — ROSUVASTATIN CALCIUM 10 MG: 10 TABLET, FILM COATED ORAL at 20:42

## 2021-01-05 RX ADMIN — GUAIFENESIN 600 MG: 600 TABLET, EXTENDED RELEASE ORAL at 20:42

## 2021-01-05 RX ADMIN — METOCLOPRAMIDE HYDROCHLORIDE 10 MG: 5 INJECTION INTRAMUSCULAR; INTRAVENOUS at 17:43

## 2021-01-05 RX ADMIN — Medication 10 ML: at 20:43

## 2021-01-05 RX ADMIN — IPRATROPIUM BROMIDE AND ALBUTEROL SULFATE 3 ML: 2.5; .5 SOLUTION RESPIRATORY (INHALATION) at 02:59

## 2021-01-05 NOTE — PROGRESS NOTES
LOS: 5 days   Patient Care Team:  Jemima Fontaine MD as PCP - General (Family Medicine)    Reason for follow-up: Postop    Subjective   Patient seen and examined.  Still requiring lots of oxygen    Objective   Abdomen is soft.  Incisions are clean dry and intact.  Tolerating full liquids well    Vital Signs  Vitals:    01/05/21 1126 01/05/21 1411 01/05/21 1446 01/05/21 1450   BP:  133/55     BP Location:  Left arm     Patient Position:  Sitting     Pulse: 85  100 99   Resp: 18 20 19 18   Temp:  98.5 °F (36.9 °C)     TempSrc:  Oral     SpO2: 94%  98% 98%   Weight:       Height:             Results Review:       Lab Results (last 24 hours)     ** No results found for the last 24 hours. **           Imaging Results (Last 24 Hours)     ** No results found for the last 24 hours. **          Medication Review:   Current Facility-Administered Medications:   •  acetaminophen (TYLENOL) tablet 650 mg, 650 mg, Oral, Q4H PRN, 650 mg at 01/03/21 1338 **OR** acetaminophen (TYLENOL) suppository 650 mg, 650 mg, Rectal, Q4H PRN, Den Plummer DO  •  amLODIPine (NORVASC) tablet 10 mg, 10 mg, Oral, Daily With Lunch, Bob Lewis DO, 10 mg at 01/05/21 1309  •  budesonide-formoterol (SYMBICORT) 160-4.5 MCG/ACT inhaler 2 puff, 2 puff, Inhalation, BID - RT, Bob Lewis DO, 2 puff at 01/05/21 0707  •  docusate sodium (COLACE) capsule 100 mg, 100 mg, Oral, BID, Bob Lewis DO, 100 mg at 01/05/21 0843  •  guaiFENesin (MUCINEX) 12 hr tablet 600 mg, 600 mg, Oral, Q12H, Bob Lewis DO, 600 mg at 01/05/21 0843  •  influenza vac split quad (FLUZONE,FLUARIX,AFLURIA,FLULAVAL) injection 0.5 mL, 0.5 mL, Intramuscular, During Hospitalization, Bob Lewis DO  •  ipratropium-albuterol (DUO-NEB) nebulizer solution 3 mL, 3 mL, Nebulization, Q4H - RT, Bob Lewis, DO, 3 mL at 01/05/21 1446  •  ketorolac (TORADOL) injection 15 mg, 15 mg, Intravenous, Q6H PRN, Shanna Goins MD, 15 mg at 01/04/21 0441  •  lactated ringers infusion,  9 mL/hr, Intravenous, Continuous PRN, Den Plummer DO, Stopped at 12/30/20 2009  •  levothyroxine (SYNTHROID, LEVOTHROID) tablet 100 mcg, 100 mcg, Oral, QAM AC, Bob Lewis, , 100 mcg at 01/05/21 0843  •  methylnaltrexone (RELISTOR) injection 6 mg, 6 mg, Subcutaneous, Every Other Day, Shanna Goins MD, 6 mg at 01/04/21 0901  •  metoclopramide (REGLAN) injection 10 mg, 10 mg, Intravenous, Q6H, Shanna Goins MD, 10 mg at 01/05/21 1309  •  Morphine sulfate (PF) injection 2 mg, 2 mg, Intravenous, Q4H PRN, Bob Lewis DO, 2 mg at 01/02/21 1328  •  [DISCONTINUED] HYDROmorphone (DILAUDID) injection 0.5 mg, 0.5 mg, Intravenous, Q2H PRN, 0.5 mg at 12/31/20 2259 **AND** naloxone (NARCAN) injection 0.1 mg, 0.1 mg, Intravenous, Q5 Min PRN, Den Plummer, DO  •  [DISCONTINUED] Morphine sulfate (PF) injection 4 mg, 4 mg, Intravenous, Q2H PRN **AND** naloxone (NARCAN) injection 0.4 mg, 0.4 mg, Intravenous, Q5 Min PRN, Den Plummer, DO  •  ondansetron (ZOFRAN) tablet 4 mg, 4 mg, Oral, Q6H PRN **OR** ondansetron (ZOFRAN) injection 4 mg, 4 mg, Intravenous, Q6H PRN, Den Plummer, DO  •  oxyCODONE (ROXICODONE) immediate release tablet 5 mg, 5 mg, Oral, Q8H PRN, Shanna Goins MD  •  pantoprazole (PROTONIX) EC tablet 40 mg, 40 mg, Oral, Q AM, Den Plummer, , 40 mg at 01/05/21 0536  •  phenylephrine (AUDRA-SYNEPHRINE) 50 mg in 250 mL NS infusion, 0.5-3 mcg/kg/min, Intravenous, Continuous PRN, Den Plummer, DO  •  polyethylene glycol (MIRALAX) packet 17 g, 17 g, Oral, Daily, Shnana Goins MD, 17 g at 01/04/21 0902  •  promethazine (PHENERGAN) tablet 12.5 mg, 12.5 mg, Oral, Q6H PRN **OR** promethazine (PHENERGAN) suppository 12.5 mg, 12.5 mg, Rectal, Q6H PRN, Den Plummer, DO  •  risperiDONE (risperDAL M-TABS) disintegrating tablet 0.25 mg, 0.25 mg, Oral, BID PRN, Bob Lewis, , 0.25 mg at 01/05/21 0834  •  rosuvastatin (CRESTOR) tablet 10 mg, 10 mg, Oral, Nightly, Bob Lewis DO, 10 mg at 01/04/21 0607  •   saline (AYR) nasal gel, , Topical, PRN, Bob Lewis, DO  •  sodium chloride 0.9 % flush 10 mL, 10 mL, Intravenous, Q12H, Meg Lund, DO, 10 mL at 01/05/21 0844  •  sodium chloride 0.9 % flush 10 mL, 10 mL, Intravenous, PRN, Meg Lund, DO, 10 mL at 01/05/21 0536    Assessment/Plan         Hiatal hernia with gastroesophageal reflux    LIBBY (obstructive sleep apnea)    Nocturnal hypoxia    Acute respiratory failure with hypoxia and hypercapnia (CMS/HCC)    Abdominal pain    Impression: Postop day #6 laparoscopic hiatal hernia repair with gastropexy for giant hiatal hernia with pulmonary compromise    Plan: Surgically is doing well.  Needs 10 days of full liquids before moving up to soft foods.          Den Plummer,   01/05/21  16:32 EST

## 2021-01-05 NOTE — PLAN OF CARE
Goal Outcome Evaluation:  Plan of Care Reviewed With: patient, sibling(sister)  Progress: improving  Patient daughter at bedside. Patient was given Risperidone 0.25mg for anxiety. Patient O2 SATs destated to 86% at 0020. RT increased PF sittings to 40/100 and 15L non-rebreather. Patient seemed to of rested well. Will continue to monitor.

## 2021-01-05 NOTE — THERAPY EVALUATION
Patient Name: Diane Guan  : 1940    MRN: [id number]                              Today's Date: 2021       Admit Date: 2020    Visit Dx:     ICD-10-CM ICD-9-CM   1. Hiatal hernia with gastroesophageal reflux  K21.9 530.81    K44.9 553.3     Patient Active Problem List   Diagnosis   • Abnormality of gait   • Mixed anxiety and depressive disorder   • Primary osteoarthritis involving multiple joints   • Breast cancer (CMS/[facility])   • Chronic lymphoid leukemia (CMS/[facility])   • Depression   • Gallbladder disorder   • Hiatal hernia with gastroesophageal reflux   • Mixed hyperlipidemia   • Essential hypertension   • Acquired hypothyroidism   • Type 2 diabetes mellitus without complication, without long-term current use of insulin (CMS/[facility])   • Insomnia   • Postmenopausal status   • Shoulder pain   • Overactive bladder   • Vitamin B 12 deficiency   • Seasonal allergies   • Absolute anemia   • Vitamin D deficiency   • LIBBY (obstructive sleep apnea)   • Nocturnal hypoxia   • Acute respiratory failure with hypoxia and hypercapnia (CMS/[facility])   • Abdominal pain     Past Medical History:   Diagnosis Date   • Acute bronchitis due to human metapneumovirus 2020   • Anxiety disorder    • Arthritis    • Breast cancer (CMS/[facility]) 2019    Invasive ductal carcinoma   • Chronic lymphocytic leukemia (CLL), B-cell (CMS/[facility]) 2019   • Depression    • Disease of thyroid gland    • Diverticulosis of colon 2018    Identified on colonoscopy   • Dysphagia 2020   • Dyspnea on exertion    • Hemorrhoid    • Hiatal hernia 2020   • Hiatal hernia with GERD     large hiatal hernia with high-grade reflux on barium swallow; s/p laparoscopic fundoplication with gastropexy   • History of diabetes mellitus     no meds now   • Hyperlipidemia    • Hypertension    • Mycobacterium avium complex (CMS/[facility]) 2019    aspiration pneumonia; completed abx therapy   • OAB (overactive bladder)    • Sleep apnea     daughter states pt does not  have and doesn't have machine     Past Surgical History:   Procedure Laterality Date   • BLADDER SURGERY     • BRONCHOSCOPY N/A 9/13/2019    Procedure: BRONCHOSCOPY with bronchial washing;  Surgeon: Marnie Frankel MD;  Location: [facility] ENDOSCOPY;  Service: Pulmonary   • COLONOSCOPY  07/19/2018    severe diverticulosis   • CYST REMOVAL      Removed a fatty cyst off of her back   • ENDOSCOPY N/A 11/23/2020    Procedure: ESOPHAGOGASTRODUODENOSCOPY with dilatation (Bougie # 48, 50, 52, 54, 56, 58);  Surgeon: Den Plummer DO;  Location: [facility] ENDOSCOPY;  Service: General;  Laterality: N/A;  Post: large hiatal hernia, joshua's ulcers   • HIATAL HERNIA REPAIR N/A 12/30/2020    Procedure: Laparoscopic hiatal hernia repair with gastropexy;  Surgeon: Den Plummer DO;  Location: [facility] MAIN OR;  Service: General;  Laterality: N/A;   • MASTECTOMY Right 02/04/2019    Invasive ductal carcinoma   • TUBAL ABDOMINAL LIGATION       General Information     [facility] Name 01/05/21 1315          Physical Therapy Time and Intention    Document Type  therapy note (daily note)  -EL     Mode of Treatment  physical therapy  -[facility] Name 01/05/21 1315          General Information    Patient Profile Reviewed  yes  -EL       User Key  (r) = Recorded By, (t) = Taken By, (c) = Cosigned By    Initials Name Provider Type    Chencho Polk PT Physical Therapist        Mobility     Row Name 01/05/21 1316          Bed Mobility    Bed Mobility  supine-sit;sit-supine  -EL     Supine-Sit [place] (Bed Mobility)  minimum assist (75% patient effort);1 person assist;verbal cues  -EL     Sit-Supine [place] (Bed Mobility)  minimum assist (75% patient effort);verbal cues  -EL     Assistive Device (Bed Mobility)  head of bed elevated;bed rails  -     Row Name 01/05/21 1316          Bed-Chair Transfer    Bed-Chair [place] (Transfers)  minimum assist (75% patient effort);1 person assist  -[facility] Name 01/05/21 1316          Sit-Stand  Transfer    Sit-Stand [place] (Transfers)  contact guard;1 person to manage equipment  -EL     Assistive Device (Sit-Stand Transfers)  walker, front-wheeled  -EL     Row Name 01/05/21 1316          Gait/Stairs (Locomotion)    [place] Level (Gait)  contact guard;1 person to manage equipment  -EL     Assistive Device (Gait)  walker, front-wheeled  -EL     Distance in Feet (Gait)  15 x2  -EL     Deviations/Abnormal Patterns (Gait)  base of support, narrow;stride length decreased;gait speed decreased  -EL     Comment (Gait/Stairs)  Distance limited by frequent bowel movements this date  -EL       User Key  (r) = Recorded By, (t) = Taken By, (c) = Cosigned By    Initials Name Provider Type    Chencho Polk PT Physical Therapist        Obj/Interventions    No documentation.       Goals/Plan    No documentation.       Clinical Impression     Row Name 01/05/21 1317          Pain Scale: FACES Pre/Post-Treatment    Pain: FACES Scale, Pretreatment  2-->hurts little bit  -EL     Posttreatment Pain Rating  2-->hurts little bit  -EL     Pain Location  abdomen  -EL     Row Name 01/05/21 1317          Plan of Care Review    Plan of Care Reviewed With  patient;daughter  -EL     Outcome Summary  Pt tolerated treatment well this date, continues to require percision flow. Pt requires MIN A with bed moblity, comes to standing with CGA, pt tolerates standing for 2 min while shifting weight side to side, including brief single leg stance bilaterally while being assisted with self care. Pt then ambulated 15 feet x2 before transferring to bedside commode. Pt required MIN VC for safe transfer. Pt again assisted with self care, daughter assisting with hygeine. PT discussed with daughter that pt is typically independent at home and needs to begin to attempt to perform some tasks like toilet hygiene by herself. Daughter demonstrates good understanding. Pt continued to demonstrate good motivation and has good support at home.  Recommendation is return home with HHPT following d/c. PPE worn includes gloves and mask with goggles.  -EL     Row Name 01/05/21 1317          Therapy Assessment/Plan (PT)    Rehab Potential (PT)  good, to achieve stated therapy goals  -EL     Criteria for Skilled Interventions Met (PT)  skilled treatment is necessary  -EL     Predicted Duration of Therapy Intervention (PT)  Until d/c  -EL     Row Name 01/05/21 1317          Vital Signs    O2 Delivery Pre Treatment  other (see comments) Percision flow 40/90%  -EL     O2 Delivery Intra Treatment  other (see comments) 40L/90%  -EL     O2 Delivery Post Treatment  other (see comments) 40/90%  -EL     Pre Patient Position  Supine  -EL     Intra Patient Position  Standing  -EL     Post Patient Position  Supine  -EL     Row Name 01/05/21 1317          Positioning and Restraints    Pre-Treatment Position  in bed  -EL     Post Treatment Position  bed  -EL     In Bed  notified nsg;supine;call light within reach;encouraged to call for assist;with family/caregiver  -EL       User Key  (r) = Recorded By, (t) = Taken By, (c) = Cosigned By    Initials Name Provider Type    EL Chencho Rojas, PT Physical Therapist        Outcome Measures     Row Name 01/05/21 1325          How much help from another person do you currently need...    Turning from your back to your side while in flat bed without using bedrails?  4  -EL     Moving from lying on back to sitting on the side of a flat bed without bedrails?  3  -EL     Moving to and from a bed to a chair (including a wheelchair)?  3  -EL     Standing up from a chair using your arms (e.g., wheelchair, bedside chair)?  4  -EL     Climbing 3-5 steps with a railing?  3  -EL     To walk in hospital room?  3  -EL     AM-PAC 6 Clicks Score (PT)  20  -EL     Row Name 01/05/21 1325          Functional Assessment    Outcome Measure Options  AM-PAC 6 Clicks Basic Mobility (PT)  -EL       User Key  (r) = Recorded By, (t) = Taken By, (c) = Cosigned By     Initials Name Provider Type    Chencho Polk, PT Physical Therapist        Physical Therapy Education                 Title: PT OT SLP Therapies (Done)     Topic: Physical Therapy (Done)     Point: Mobility training (Done)     Learning Progress Summary           Patient Acceptance, E,TB, VU by  at 1/5/2021 1325    Acceptance, E,TB, VU by SC at 1/4/2021 1905    Acceptance, E,TB, VU by  at 1/3/2021 1712    Acceptance, E,TB, VU by MI at 12/31/2020 1121                   Point: Precautions (Done)     Learning Progress Summary           Patient Acceptance, E,TB, VU by  at 1/5/2021 1325    Acceptance, E,TB, VU by SC at 1/4/2021 1905    Acceptance, E,TB, VU by MI at 12/31/2020 1121                               User Key     Initials Effective Dates Name Provider Type Discipline     03/01/19 -  Kinga Delatorre, PT Physical Therapist PT    SC 03/01/19 -  Mya Lawton PTA Physical Therapy Assistant PT     06/23/20 -  Chencho Rojas, PT Physical Therapist PT    MI 03/01/19 -  Ubaldo Elder, RN Registered Nurse Nurse              PT Recommendation and Plan     Plan of Care Reviewed With: patient, daughter  Outcome Summary: Pt tolerated treatment well this date, continues to require percision flow. Pt requires MIN A with bed moblity, comes to standing with CGA, pt tolerates standing for 2 min while shifting weight side to side, including brief single leg stance bilaterally while being assisted with self care. Pt then ambulated 15 feet x2 before transferring to bedside commode. Pt required MIN VC for safe transfer. Pt again assisted with self care, daughter assisting with hygeine. PT discussed with daughter that pt is typically independent at home and needs to begin to attempt to perform some tasks like toilet hygiene by herself. Daughter demonstrates good understanding. Pt continued to demonstrate good motivation and has good support at home. Recommendation is return home with HHPT following d/c. PPE worn includes  gloves and mask with goggles.     Time Calculation:   PT Charges     Row Name 01/05/21 1326             Time Calculation    Start Time  0921  -EL      Stop Time  0944  -EL      Time Calculation (min)  23 min  -EL      PT Received On  01/05/21  -EL      PT - Next Appointment  01/06/21  -EL         Time Calculation- PT    Total Timed Code Minutes- PT  23 minute(s)  -EL        User Key  (r) = Recorded By, (t) = Taken By, (c) = Cosigned By    Initials Name Provider Type    Chencho Polk PT Physical Therapist        Therapy Charges for Today     Code Description Service Date Service Provider Modifiers Qty    27420725307 HC GAIT TRAINING EA 15 MIN 1/5/2021 Chencho Rojas, PT GP 1    81438603194 HC PT THERAPEUTIC ACT EA 15 MIN 1/5/2021 Chencho Rojas, PT GP 1          PT G-Codes  Outcome Measure Options: AM-PAC 6 Clicks Basic Mobility (PT)  AM-PAC 6 Clicks Score (PT): 20    Chencho Rojas PT  1/5/2021

## 2021-01-05 NOTE — PROGRESS NOTES
[facility] Medicine Services Daily Progress Note      Hospitalist Team  LOS 5 days      Patient Care Team:  Jemima Fontaine MD as PCP - General (Family Medicine)    Patient Location: 2105/1      Subjective   Subjective     Chief Complaint / Subjective  No chief complaint on file.  follow up shortness of breath       Brief Synopsis of Hospital Course/HPI     80-year-old female who was having issues with GERD was found to have hiatal hernia and is underwent fixation per general surgery.  She has been admitted postoperatively medicine has been consulted to help with her medical problems.  Her blood pressure stable she is having some apnea spells from the sedation but overall her breathing is stable.  No other issues.  As reported she is slightly sedated from the procedure and HPI was slightly limited.         Date::    12/31/20: had worsening respiratory status last night and has been on HFNC this am. Refusing to wear BIPAP although likely needs it fo LIBBY.    1/1/20: on BIPAP this am and thus HPI limited. Events of overnight noted was moved to higher level of care. O2 is stable today. Seems O2 worse when not deep breathing and when sleeping as has LIBBY.     1/2/21: no real improvement. Having issues with pain medication and sedation not allowing pulmonary toilet. Will not wear BIPAP    1/3/21: Slight improvement today.  Per the nurse she had been using her incentive spirometer and been more ambulatory.  Encourage patient with pulmonology at bedside to continue to increase activity    1/4/2021: Patient seen and examined this morning.  Remains short of breath and on high flow.  Encouraged to ambulate more and use examination amatory, patient agrees.  No other complaints or acute events overnight per nursing.    1/5/2021: Patient seen this morning.  Still the same, shortness of breath the same.  Remains on high flow.  No other complaints.  Denies fever, chills, chest pain, nausea, vomiting,  "diarrhea, dysuria, or dizziness.    Review of Systems   All other systems reviewed and are negative.        Objective   Objective      Vital Signs  Temp:  [97.8 °F (36.6 °C)-99.9 °F (37.7 °C)] 98.2 °F (36.8 °C)  Heart Rate:  [] 85  Resp:  [12-20] 18  BP: (130-152)/(47-56) 130/50  Oxygen Therapy  SpO2: 94 %  Pulse Oximetry Type: Continuous  Device (Oxygen Therapy): heated, humidified, high-flow nasal cannula  $ High Flow Nasal Cannula Set-Up: yes  Flow (L/min): 40  Oxygen Concentration (%): 90  Flowsheet Rows      First Filed Value   Admission Height  157.5 cm (62\") Documented at 12/16/2020 1444   Admission Weight  81.6 kg (180 lb) Documented at 12/16/2020 1444        Intake & Output (last 3 days)       01/02 0701 - 01/03 0700 01/03 0701 - 01/04 0700 01/04 0701 - 01/05 0700 01/05 0701 - 01/06 0700    P.O.  240 600 240    I.V. (mL/kg) 593 (6.8)       IV Piggyback 100       Total Intake(mL/kg) 693 (7.9) 240 (2.9) 600 (7.2) 240 (2.9)    Urine (mL/kg/hr) 550 (0.3) 100 (0)      Total Output 550 100      Net +143 +140 +600 +240            Urine Unmeasured Occurrence  3 x 1 x     Stool Unmeasured Occurrence  2 x 1 x         Lines, Drains & Airways    Active LDAs     Name:   Placement date:   Placement time:   Site:   Days:    Peripheral IV 12/31/20 0938 Anterior;Left Forearm   12/31/20    0938    Forearm   less than 1                  Physical Exam:    General: Awake, alert, elderly female, NAD  Eyes: PERRL, EOMI, conjunctive are clear  Cardiovascular: Regular rate and rhythm, no murmurs  Respiratory: Decreased breath sounds bilaterally, no wheezing or rales, unlabored breathing  Abdomen: Soft, tender around incision sites, positive bowel sounds, no guarding  Neurologic: A&O, CN grossly intact, moves all extremities spontaneously  Musculoskeletal: Normal range of motion, no deformities  Skin: Warm, dry, intact         Wounds (last 24 hours)      LDA Wound     Row Name 01/05/21 0725 01/04/21 2050 01/04/21 1610       " Wound 12/30/20 1037 abdomen Incision    Wound - Properties Group Placement Date: 12/30/20  -AM Placement Time: 1037  -AM Location: abdomen  -AM Primary Wound Type: Incision  -AM    Closure  BRIANNE  -SH  BRIANNE  -SE  BRIANNE  -HV    Base  dressing in place, unable to visualize  -SH  --  --    Periwound  --  intact;dry  -SE  intact;dry  -HV    Retired Wound - Properties Group Date first assessed: 12/30/20  -AM Time first assessed: 1037  -AM Location: abdomen  -AM Primary Wound Type: Incision  -AM      User Key  (r) = Recorded By, (t) = Taken By, (c) = Cosigned By    Initials Name Provider Type    AM Ping Martinez, RN Registered Nurse    Lisa Alfonso, RN Registered Nurse    Roshni Hanna, RN Registered Nurse    Iasbella Mariee, RN Registered Nurse          Procedures:    Procedure(s):  Laparoscopic hiatal hernia repair with gastropexy          Results Review:     I reviewed the patient's new clinical results.      Lab Results (last 24 hours)     ** No results found for the last 24 hours. **        No results found for: HGBA1C        Results from last 7 days   Lab Units 01/02/21  1711   PH, ARTERIAL pH units 7.353   PO2 ART mm Hg 72.0*   PCO2, ARTERIAL mm Hg 48.2*   HCO3 ART mmol/L 26.8     No results found for: LIPASE  Lab Results   Component Value Date    CHOL 374 (H) 01/03/2020    TRIG 311 (H) 01/03/2020    HDL 50 01/03/2020     (H) 01/03/2020       Lab Results   Lab Value Date/Time    FINALDX  12/31/2020 0205     Leukocytosis with absolute atypical lymphocytosis  Anemia  No blasts identified  If CBC indices persist/worsen, consider flow cytometry or other studies as clinically indicated to exclude a lymphoproliferative disorder      FINALDX  02/06/2020 1405     Absolute atypical lymphocytosis.  Anemia.  No blasts identified.  Recommend clinical follow-up and additional studies to include flow cytometry as clinically indicated to exclude CLL/SLL or other lymphoproliferative disorder.          Microbiology Results (last 10 days)     Procedure Component Value - Date/Time    COVID-19,APTIMA PANTHER,[facility]LEE IN-HOUSE, NP/OP SWAB IN UTM/VTM/SALINE TRANSPORT MEDIA,24 HR TAT - Swab, Nasopharynx [[id number]]  (Normal) Collected: 12/28/20 1227    Lab Status: Final result Specimen: Swab from Nasopharynx Updated: 12/28/20 2046     COVID19 Not Detected    Narrative:      Fact sheet for providers: https://www.fda.gov/media/622465/download     Fact sheet for patients: https://www.fda.gov/media/063601/download    Test performed by PCR.          ECG/EMG Results (most recent)     Procedure Component Value Units Date/Time    ECG 12 Lead [[id number]] Collected: 12/30/20 1314     Updated: 01/02/21 1511     QT Interval 421 ms     Narrative:      HEART RATE= 71  bpm  RR Interval= 844  ms  MS Interval= 172  ms  P Horizontal Axis= -9  deg  P Front Axis= 48  deg  QRSD Interval= 101  ms  QT Interval= 421  ms  QRS Axis= 41  deg  T Wave Axis= 77  deg  - ABNORMAL ECG -  Sinus rhythm With old anterior septal MI  Low voltage, precordial leads  When compared with ECG of 18-Dec-2020 14:29:46,  Poor R wave progression V1 to V3 noted  Electronically Signed By: Fausto Jean (PAVAN) 02-Jan-2021 15:05:56  Date and Time of Study: 2020-12-30 13:14:52                    Xr Chest 1 View    Result Date: 1/2/2021    1.  Low volume inspiration with persistent left lower lobe airspace consolidation which is unchanged. 2.  Small bilateral pleural effusions.   Electronically Signed By-Jan Vernon MD On:1/2/2021 11:39 AM This report was finalized on [id number] by  Jan Vernon MD.    Xr Chest 1 View    Result Date: 12/31/2020   1. Consolidation in the left lung base which appears new. 2. Lower lung volumes with right basilar atelectasis. 3. Cardiomegaly without acute cardiac decompensation. 4. Right basilar atelectasis.  Electronically Signed By-Den Connell MD On:12/31/2020 9:10 AM This report was finalized on [id number] by  Den  MD Ko.          Xrays, labs reviewed personally by physician.    Medication Review:   I have reviewed the patient's current medication list      Scheduled Meds  amLODIPine, 10 mg, Oral, Daily With Lunch  budesonide-formoterol, 2 puff, Inhalation, BID - RT  docusate sodium, 100 mg, Oral, BID  guaiFENesin, 600 mg, Oral, Q12H  ipratropium-albuterol, 3 mL, Nebulization, Q4H - RT  levothyroxine, 100 mcg, Oral, QAM AC  methylnaltrexone, 6 mg, Subcutaneous, Every Other Day  metoclopramide, 10 mg, Intravenous, Q6H  pantoprazole, 40 mg, Oral, Q AM  polyethylene glycol, 17 g, Oral, Daily  rosuvastatin, 10 mg, Oral, Nightly  sodium chloride, 10 mL, Intravenous, Q12H        Meds Infusions  lactated ringers, 9 mL/hr, Last Rate: Stopped (12/30/20 2009)  phenylephrine, 0.5-3 mcg/kg/min        Meds PRN  •  acetaminophen **OR** acetaminophen  •  influenza vaccine  •  ketorolac  •  lactated ringers  •  Morphine  •  [DISCONTINUED] HYDROmorphone **AND** naloxone  •  [DISCONTINUED] Morphine **AND** naloxone  •  ondansetron **OR** ondansetron  •  oxyCODONE  •  phenylephrine  •  promethazine **OR** promethazine  •  risperiDONE  •  saline  •  sodium chloride        Assessment/Plan   Assessment/Plan     Active Hospital Problems    Diagnosis  POA   • **Hiatal hernia with gastroesophageal reflux [K21.9, K44.9]  Unknown   • Acute respiratory failure with hypoxia and hypercapnia (CMS/HCC) [J96.01, J96.02]  Unknown   • Abdominal pain [R10.9]  Unknown   • Nocturnal hypoxia [G47.34]  Yes   • LIBBY (obstructive sleep apnea) [G47.33]  Unknown      Resolved Hospital Problems   No resolved problems to display.       MEDICAL DECISION MAKING COMPLEXITY BY PROBLEM:     Acute on chronic hypoxic respiratory failure, LIBBY  LLL PNA  -likely realted to narcotics and LIBBY vs LLL CAP  -CXR 12/31/20: new LLL infiltrate  -CT chest reviewed b/l LL atelectasis vs infiltrate  -Remains on high flow, wean off as tolerated  -compliant with AVAP  -Nebulizers,  symbicort  -Finished Zosyn  -encourage aggressive pulmonary toilet  -pulmonary following    Hiatal hernia with GERD   Possible postoperative ileus  -S/p laparoscopic hiatal hernia repair with gastropexy  -General surgery primary postop per them  -PPI, carafate, reglan prn  -bowel regimen per surgery      Hypertension  -Continue home meds  -Monitor and adjust blood pressure prn    CLL  -WBC chronically elevated,(baseline 15-20 range)  -Follow CBC while here    DVT prophylaxis  -Per primary      VTE Prophylaxis -   Mechanical Order History:      Ordered        12/30/20 1513  Place Sequential Compression Device  Once         12/30/20 1513  Place Sequential Compression Device  Once         12/30/20 1513  Maintain Sequential Compression Device  Continuous                 Pharmalogical Order History:     None            Code Status -   Code Status and Medical Interventions:   Ordered at: 12/30/20 1513     Code Status:    CPR     Medical Interventions (Level of Support Prior to Arrest):    Full       This patient has been examined wearing appropriate Personal Protective Equipment. 01/05/21        Discharge Planning  Respiratory status will have to improve before d/c         Electronically signed by Meg Lund DO, 01/05/21, 12:07 EST.  [facility] Hospitalist Team

## 2021-01-05 NOTE — PROGRESS NOTES
"PULMONARY CRITICAL CARE Progress  NOTE      PATIENT IDENTIFICATION:  Name: Diane Guan  MRN: [id number]  :  1940     Age: 80 y.o.  Sex: female    DATE OF Note:  2021   Referring Physician: Den Plummer DO                  Subjective:   Still weak tired short of breathing  Denies any   SOB but she is on 75% FiO2 no chest pain, no nausea or vomiting, no change in bowel habit, no dysuria,  no new  skin rash or itching.      Objective:  tMax 24 hrs: Temp (24hrs), Av.5 °F (36.9 °C), Min:97.8 °F (36.6 °C), Max:99.9 °F (37.7 °C)      Vitals Ranges:   Temp:  [97.8 °F (36.6 °C)-99.9 °F (37.7 °C)] 99.9 °F (37.7 °C)  Heart Rate:  [] 100  Resp:  [12-20] 20  BP: (143-154)/(47-64) 143/47    Intake and Output Last 3 Shifts:   I/O last 3 completed shifts:  In: 840 [P.O.:840]  Out: 100 [Urine:100]    Exam:  /47   Pulse 100   Temp 99.9 °F (37.7 °C)   Resp 20   Ht 157.5 cm (62\")   Wt 83.9 kg (184 lb 15.5 oz) Comment: no blankets on bed when new weight was taken   SpO2 91%   BMI 33.83 kg/m²     General Appearance:   Alert awake  HEENT:  Normocephalic, without obvious abnormality, Conjunctiva/corneas clear,.  Normal external ear canals, Nares normal, no drainage     Neck:  Supple, symmetrical, trachea midline. No JVD.  Lungs /Chest wall:   Bilateral basal rhonchi, respirations unlabored symmetrical wall movement.     Heart:  Regular rate and rhythm, systolic murmur PMI left sternal border  Abdomen: Soft, non-tender, no masses, no organomegaly.    Extremities: Trace edema no clubbing or Cyanosis        Medications:    Current Facility-Administered Medications:   •  acetaminophen (TYLENOL) tablet 650 mg, 650 mg, Oral, Q4H PRN, 650 mg at 21 1338 **OR** acetaminophen (TYLENOL) suppository 650 mg, 650 mg, Rectal, Q4H PRN, Den Plummer,   •  amLODIPine (NORVASC) tablet 10 mg, 10 mg, Oral, Daily With Lunch, Bob Lewis DO, 10 mg at 21 1229  •  budesonide-formoterol (SYMBICORT) " 160-4.5 MCG/ACT inhaler 2 puff, 2 puff, Inhalation, BID - RT, Bob Lewis, , 2 puff at 01/04/21 1920  •  docusate sodium (COLACE) capsule 100 mg, 100 mg, Oral, BID, Bob Lewis, , 100 mg at 01/04/21 0901  •  guaiFENesin (MUCINEX) 12 hr tablet 600 mg, 600 mg, Oral, Q12H, Bob Lewis, , 600 mg at 01/04/21 0901  •  influenza vac split quad (FLUZONE,FLUARIX,AFLURIA,FLULAVAL) injection 0.5 mL, 0.5 mL, Intramuscular, During Hospitalization, Bob Lewis DO  •  ipratropium-albuterol (DUO-NEB) nebulizer solution 3 mL, 3 mL, Nebulization, Q4H - RT, Bob Lewis, , 3 mL at 01/04/21 1919  •  ketorolac (TORADOL) injection 15 mg, 15 mg, Intravenous, Q6H PRN, Shanna Goins MD, 15 mg at 01/04/21 0441  •  lactated ringers infusion, 9 mL/hr, Intravenous, Continuous PRN, Den Plummer DO, Stopped at 12/30/20 2009  •  levothyroxine (SYNTHROID, LEVOTHROID) tablet 100 mcg, 100 mcg, Oral, QAM AC, Bob Lewis, , 100 mcg at 01/04/21 0442  •  methylnaltrexone (RELISTOR) injection 6 mg, 6 mg, Subcutaneous, Every Other Day, Shanna Goins MD, 6 mg at 01/04/21 0901  •  metoclopramide (REGLAN) injection 10 mg, 10 mg, Intravenous, Q6H, Shanna Goins MD, 10 mg at 01/04/21 1229  •  Morphine sulfate (PF) injection 2 mg, 2 mg, Intravenous, Q4H PRN, Bob Lewis DO, 2 mg at 01/02/21 1328  •  [DISCONTINUED] HYDROmorphone (DILAUDID) injection 0.5 mg, 0.5 mg, Intravenous, Q2H PRN, 0.5 mg at 12/31/20 2259 **AND** naloxone (NARCAN) injection 0.1 mg, 0.1 mg, Intravenous, Q5 Min PRN, Den Plummer, DO  •  [DISCONTINUED] Morphine sulfate (PF) injection 4 mg, 4 mg, Intravenous, Q2H PRN **AND** naloxone (NARCAN) injection 0.4 mg, 0.4 mg, Intravenous, Q5 Min PRN, Den Plummer, DO  •  ondansetron (ZOFRAN) tablet 4 mg, 4 mg, Oral, Q6H PRN **OR** ondansetron (ZOFRAN) injection 4 mg, 4 mg, Intravenous, Q6H PRN, Den Plummer, DO  •  oxyCODONE (ROXICODONE) immediate release tablet 5 mg, 5 mg, Oral, Q8H PRN, Shanna Goins MD  •   pantoprazole (PROTONIX) EC tablet 40 mg, 40 mg, Oral, Q AM, Den Plummer, DO, 40 mg at 01/04/21 0442  •  phenylephrine (AUDRA-SYNEPHRINE) 50 mg in 250 mL NS infusion, 0.5-3 mcg/kg/min, Intravenous, Continuous PRN, Den Plummer, DO  •  piperacillin-tazobactam (ZOSYN) IVPB 3.375 g in 100 mL NS (CD), 3.375 g, Intravenous, Q8H, Bob Lewis, DO, 3.375 g at 01/04/21 1228  •  polyethylene glycol (MIRALAX) packet 17 g, 17 g, Oral, Daily, Shanna Goins MD, 17 g at 01/04/21 0902  •  promethazine (PHENERGAN) tablet 12.5 mg, 12.5 mg, Oral, Q6H PRN **OR** promethazine (PHENERGAN) suppository 12.5 mg, 12.5 mg, Rectal, Q6H PRN, Den Plummer, DO  •  risperiDONE (risperDAL M-TABS) disintegrating tablet 0.25 mg, 0.25 mg, Oral, BID PRN, Bob Lewis, DO  •  rosuvastatin (CRESTOR) tablet 10 mg, 10 mg, Oral, Nightly, Bob Lewis, , 10 mg at 01/03/21 2029  •  saline (AYR) nasal gel, , Topical, PRN, Bob Lewis, DO  •  sodium chloride 0.9 % flush 10 mL, 10 mL, Intravenous, Q12H, Meg Lund, DO  •  sodium chloride 0.9 % flush 10 mL, 10 mL, Intravenous, PRN, Meg Lund, DO    Data Review:  All labs (24hrs):   Recent Results (from the past 24 hour(s))   Basic Metabolic Panel    Collection Time: 01/04/21  3:16 AM    Specimen: Blood   Result Value Ref Range    Glucose 99 65 - 99 mg/dL    BUN 23 8 - 23 mg/dL    Creatinine 1.03 (H) 0.57 - 1.00 mg/dL    Sodium 137 136 - 145 mmol/L    Potassium 4.0 3.5 - 5.2 mmol/L    Chloride 103 98 - 107 mmol/L    CO2 23.0 22.0 - 29.0 mmol/L    Calcium 9.0 8.6 - 10.5 mg/dL    eGFR Non African Amer 52 (L) >60 mL/min/1.73    BUN/Creatinine Ratio 22.3 7.0 - 25.0    Anion Gap 11.0 5.0 - 15.0 mmol/L   CBC Auto Differential    Collection Time: 01/04/21  3:16 AM    Specimen: Blood   Result Value Ref Range    WBC 10.00 3.40 - 10.80 10*3/mm3    RBC 3.53 (L) 3.77 - 5.28 10*6/mm3    Hemoglobin 9.7 (L) 12.0 - 15.9 g/dL    Hematocrit 29.7 (L) 34.0 - 46.6 %    MCV 84.1 79.0 - 97.0 fL    MCH 27.4 26.6 -  33.0 pg    MCHC 32.6 31.5 - 35.7 g/dL    RDW 14.9 12.3 - 15.4 %    RDW-SD 43.8 37.0 - 54.0 fl    MPV 9.9 6.0 - 12.0 fL    Platelets 150 140 - 450 10*3/mm3   Scan Slide    Collection Time: 01/04/21  3:16 AM    Specimen: Blood   Result Value Ref Range    Scan Slide     Manual Differential    Collection Time: 01/04/21  3:16 AM    Specimen: Blood   Result Value Ref Range    Neutrophil % 41.0 (L) 42.7 - 76.0 %    Lymphocyte % 53.0 (H) 19.6 - 45.3 %    Monocyte % 2.0 (L) 5.0 - 12.0 %    Eosinophil % 4.0 0.3 - 6.2 %    Neutrophils Absolute 4.10 1.70 - 7.00 10*3/mm3    Lymphocytes Absolute 5.30 (H) 0.70 - 3.10 10*3/mm3    Monocytes Absolute 0.20 0.10 - 0.90 10*3/mm3    Eosinophils Absolute 0.40 0.00 - 0.40 10*3/mm3    RBC Morphology Normal Normal    WBC Morphology Normal Normal    Platelet Morphology Normal Normal   POC Glucose Once    Collection Time: 01/04/21 11:36 AM    Specimen: Blood   Result Value Ref Range    Glucose 136 (H) 70 - 105 mg/dL        Imaging:  XR Chest 1 View  Narrative: XR CHEST 1 VW-     Date of Exam: 1/2/2021 11:00 AM     Indication: dyspne; K21.3-Ehouyl-okrlfkmfbr reflux disease without  esophagitis; K44.9-Diaphragmatic hernia without obstruction or gangrene.     Comparison: 12/31/2020     Technique: A single view of the chest was obtained.     FINDINGS:      Cardiomegaly is stable.  Pulmonary vessels are normal.  Lung  volumes are low.  There is persistent left lower lobe airspace  consolidation which is unchanged.  Right lung appears clear.  There are  small bilateral pleural effusions.  Bony structures are unremarkable.           Impression:       1.  Low volume inspiration with persistent left lower lobe airspace  consolidation which is unchanged.  2.  Small bilateral pleural effusions.        Electronically Signed By-Jan Vernon MD On:1/2/2021 11:39 AM  This report was finalized on [id number] by  Jan Vernon MD.       ASSESSMENT:    Acute respiratory failure with hypoxia and  hypercapnia (CMS/HCC)   Hiatal hernia with gastroesophageal reflux    LIBBY (obstructive sleep apnea)    Nocturnal hypoxia    Abdominal pain       PLAN:  Oxygen support  Continue current antibiotics  I-S flutter valve  Bronchodilator  Inhaled corticosteroids  Electrolytes/ glycemic control  DVT and GI prophylaxis.    Total Critical care time in direct medical management (   ) minutes  Wily Rutherford MD. D, ABSM.     1/4/2021  20:27 EST

## 2021-01-05 NOTE — PLAN OF CARE
Goal Outcome Evaluation:  Plan of Care Reviewed With: patient, daughter  Progress: improving           Pt remains on 40L/90% on PF. She has used her IS multiple times throughout the shift as well as ambulated to the chair.

## 2021-01-05 NOTE — PLAN OF CARE
Goal Outcome Evaluation:  Plan of Care Reviewed With: patient, sibling(sister)  Progress: improving  Outcome Summary: pt cont to be on precision flow 02 but was able to increase activity tolerance today.  pt was able amb a short distance with roll walker and cga 3 times.  pt transfers in and out of the bed with min assist.  pt is very motivated and has a very good support system that encourages pt with her mobility.  PT recommend home with assist and HHPT to follow.  PPE used:  mask, safety glasses and gloves

## 2021-01-05 NOTE — PLAN OF CARE
Goal Outcome Evaluation:  Plan of Care Reviewed With: patient, daughter  Progress: improving  Outcome Summary: Pt tolerated treatment well this date, continues to require percision flow. Pt requires MIN A with bed moblity, comes to standing with CGA, pt tolerates standing for 2 min while shifting weight side to side, including brief single leg stance bilaterally while being assisted with self care. Pt then ambulated 15 feet x2 before transferring to bedside commode. Pt required MIN VC for safe transfer. Pt again assisted with self care, daughter assisting with hygeine. PT discussed with daughter that pt is typically independent at home and needs to begin to attempt to perform some tasks like toilet hygiene by herself. Daughter demonstrates good understanding. Pt continued to demonstrate good motivation and has good support at home. Recommendation is return home with HHPT following d/c. PPE worn includes gloves and mask with goggles.

## 2021-01-05 NOTE — THERAPY TREATMENT NOTE
Patient Name: Diane Guan  : 1940    MRN: [id number]                              Today's Date: 2021       Admit Date: 2020    Visit Dx:     ICD-10-CM ICD-9-CM   1. Hiatal hernia with gastroesophageal reflux  K21.9 530.81    K44.9 553.3     Patient Active Problem List   Diagnosis   • Abnormality of gait   • Mixed anxiety and depressive disorder   • Primary osteoarthritis involving multiple joints   • Breast cancer (CMS/[facility])   • Chronic lymphoid leukemia (CMS/[facility])   • Depression   • Gallbladder disorder   • Hiatal hernia with gastroesophageal reflux   • Mixed hyperlipidemia   • Essential hypertension   • Acquired hypothyroidism   • Type 2 diabetes mellitus without complication, without long-term current use of insulin (CMS/[facility])   • Insomnia   • Postmenopausal status   • Shoulder pain   • Overactive bladder   • Vitamin B 12 deficiency   • Seasonal allergies   • Absolute anemia   • Vitamin D deficiency   • LIBBY (obstructive sleep apnea)   • Nocturnal hypoxia   • Acute respiratory failure with hypoxia and hypercapnia (CMS/[facility])   • Abdominal pain     Past Medical History:   Diagnosis Date   • Acute bronchitis due to human metapneumovirus 2020   • Anxiety disorder    • Arthritis    • Breast cancer (CMS/[facility]) 2019    Invasive ductal carcinoma   • Chronic lymphocytic leukemia (CLL), B-cell (CMS/[facility]) 2019   • Depression    • Disease of thyroid gland    • Diverticulosis of colon 2018    Identified on colonoscopy   • Dysphagia 2020   • Dyspnea on exertion    • Hemorrhoid    • Hiatal hernia 2020   • Hiatal hernia with GERD     large hiatal hernia with high-grade reflux on barium swallow; s/p laparoscopic fundoplication with gastropexy   • History of diabetes mellitus     no meds now   • Hyperlipidemia    • Hypertension    • Mycobacterium avium complex (CMS/[facility]) 2019    aspiration pneumonia; completed abx therapy   • OAB (overactive bladder)    • Sleep apnea     daughter states pt does not  have and doesn't have machine     Past Surgical History:   Procedure Laterality Date   • BLADDER SURGERY     • BRONCHOSCOPY N/A 9/13/2019    Procedure: BRONCHOSCOPY with bronchial washing;  Surgeon: Marnie Frankel MD;  Location: [facility] ENDOSCOPY;  Service: Pulmonary   • COLONOSCOPY  07/19/2018    severe diverticulosis   • CYST REMOVAL      Removed a fatty cyst off of her back   • ENDOSCOPY N/A 11/23/2020    Procedure: ESOPHAGOGASTRODUODENOSCOPY with dilatation (Bougie # 48, 50, 52, 54, 56, 58);  Surgeon: Den Plummer DO;  Location: [facility] ENDOSCOPY;  Service: General;  Laterality: N/A;  Post: large hiatal hernia, joshua's ulcers   • HIATAL HERNIA REPAIR N/A 12/30/2020    Procedure: Laparoscopic hiatal hernia repair with gastropexy;  Surgeon: Den Plummer DO;  Location: [facility] MAIN OR;  Service: General;  Laterality: N/A;   • MASTECTOMY Right 02/04/2019    Invasive ductal carcinoma   • TUBAL ABDOMINAL LIGATION       General Information     Row Name 01/04/21 1855          Physical Therapy Time and Intention    Document Type  therapy note (daily note)  -SC     Mode of Treatment  physical therapy  -SC     Row Name 01/04/21 1855          General Information    Existing Precautions/Restrictions  fall;oxygen therapy device and L/min  -[facility] Name 01/04/21 1855          Cognition    Orientation Status (Cognition)  oriented x 4  -[facility] Name 01/04/21 1855          Safety Issues, Functional Mobility    Impairments Affecting Function (Mobility)  endurance/activity tolerance;strength;shortness of breath;pain;balance  -SC       User Key  (r) = Recorded By, (t) = Taken By, (c) = Cosigned By    Initials Name Provider Type    SC Mya Lawton PTA Physical Therapy Assistant        Mobility     Row Name 01/04/21 1856          Bed Mobility    Bed Mobility  supine-sit;sit-supine  -SC     Supine-Sit [place] (Bed Mobility)  minimum assist (75% patient effort);1 person assist;verbal cues  -SC     Sit-Supine  [place] (Bed Mobility)  minimum assist (75% patient effort);1 person to manage equipment;verbal cues  -SC     Assistive Device (Bed Mobility)  head of bed elevated;bed rails  -SC     Comment (Bed Mobility)  good balance at eob  -SC     Row Name 01/04/21 1856          Transfers    Comment (Transfers)  sit to stand from eob x'2 and from bs comode x's 1  -SC     Row Name 01/04/21 1856          Sit-Stand Transfer    Sit-Stand [place] (Transfers)  contact guard;1 person to manage equipment  -SC     Assistive Device (Sit-Stand Transfers)  walker, front-wheeled  -SC     Row Name 01/04/21 1856          Gait/Stairs (Locomotion)    [place] Level (Gait)  contact guard;1 person to manage equipment  -SC     Assistive Device (Gait)  walker, front-wheeled  -SC     Distance in Feet (Gait)  20' x's 2 and 15' x's 1  -SC     Deviations/Abnormal Patterns (Gait)  base of support, narrow;stride length decreased;gait speed decreased  -SC     Bilateral Gait Deviations  foot drop/toe drag  -SC     Comment (Gait/Stairs)  cues for posture and to increased step length.  -SC       User Key  (r) = Recorded By, (t) = Taken By, (c) = Cosigned By    Initials Name Provider Type    Mya Keating, VASHTI Physical Therapy Assistant        Obj/Interventions     Row Name 01/04/21 1900          Balance    Balance Assessment  sitting static balance;sitting dynamic balance;standing static balance;standing dynamic balance  -SC     Static Sitting Balance  WNL;sitting, edge of bed;unsupported  -SC     Dynamic Sitting Balance  WFL;sitting, edge of bed  -SC     Static Standing Balance  WFL;supported;standing with roll walker  -SC     Dynamic Standing Balance  mild impairment;supported;standing with roll walker  -SC       User Key  (r) = Recorded By, (t) = Taken By, (c) = Cosigned By    Initials Name Provider Type    Mya Keating, VASHTI Physical Therapy Assistant        Goals/Plan    No documentation.       Clinical Impression     Row  Name 01/04/21 1901          Pain Scale: FACES Pre/Post-Treatment    Pain: FACES Scale, Pretreatment  2-->hurts little bit  -SC     Posttreatment Pain Rating  2-->hurts little bit  -SC     Pain Location  abdomen  -SC     Row Name 01/04/21 1901          Therapy Assessment/Plan (PT)    Rehab Potential (PT)  good, to achieve stated therapy goals  -SC     Criteria for Skilled Interventions Met (PT)  skilled treatment is necessary  -SC     Row Name 01/04/21 1901          Vital Signs    Pre SpO2 (%)  95  -SC     O2 Delivery Pre Treatment  other (see comments) precision flow 40L at 75%  -SC     Intra SpO2 (%)  92  -SC     O2 Delivery Intra Treatment  other (see comments) precision flow 40L at 75%  -SC     Post SpO2 (%)  94  -SC     O2 Delivery Post Treatment  other (see comments) precision flow 40L at 75%  -SC     Row Name 01/04/21 1901          Positioning and Restraints    Pre-Treatment Position  in bed  -SC     Post Treatment Position  bed pt returned to bed a short time before PT tx session  -SC     In Bed  notified nsg;supine;fowlers;call light within reach;with family/caregiver  -SC       User Key  (r) = Recorded By, (t) = Taken By, (c) = Cosigned By    Initials Name Provider Type    Mya Keating, PTA Physical Therapy Assistant        Outcome Measures    No documentation.       Physical Therapy Education                 Title: PT OT SLP Therapies (Done)     Topic: Physical Therapy (Done)     Point: Mobility training (Done)     Learning Progress Summary           Patient Acceptance, E,TB, VU by SC at 1/4/2021 1905    Acceptance, E,TB, VU by  at 1/3/2021 1712    Acceptance, E,TB, VU by MI at 12/31/2020 1121                   Point: Precautions (Done)     Learning Progress Summary           Patient Acceptance, E,TB, VU by SC at 1/4/2021 1905    Acceptance, E,TB, VU by MI at 12/31/2020 1121                               User Key     Initials Effective Dates Name Provider Type [facility] 03/01/19 -   Kinga Delatorre, PT Physical Therapist PT    SC 03/01/19 -  Mya Lawton PTA Physical Therapy Assistant PT    MI 03/01/19 -  Ubaldo Elder, RN Registered Nurse Nurse              PT Recommendation and Plan     Plan of Care Reviewed With: patient, sibling(sister)  Progress: improving  Outcome Summary: pt cont to be on precision flow 02 but was able to increase activity tolerance today.  pt was able amb a short distance with roll walker and cga 3 times.  pt transfers in and out of the bed with min assist.  pt is very motivated and has a very good support system that encourages pt with her mobility.  PT recommend home with assist and HHPT to follow.  PPE used:  mask, safety glasses and gloves     Time Calculation:   PT Charges     Row Name 01/04/21 1909             Time Calculation    Start Time  1550  -SC      Stop Time  1628  -SC      Time Calculation (min)  38 min  -SC      PT Received On  01/04/21  -SC      PT - Next Appointment  01/05/21  -SC         Time Calculation- PT    Total Timed Code Minutes- PT  38 minute(s)  -SC         Timed Charges    03597 - Gait Training Minutes   13  -SC      20734 - PT Therapeutic Activity Minutes  25  -SC        User Key  (r) = Recorded By, (t) = Taken By, (c) = Cosigned By    Initials Name Provider Type    SC Mya Lawton PTA Physical Therapy Assistant        Therapy Charges for Today     Code Description Service Date Service Provider Modifiers Qty    38413418491 HC GAIT TRAINING EA 15 MIN 1/4/2021 Mya Lawton PTA GP 1    27613762160 HC PT THERAPEUTIC ACT EA 15 MIN 1/4/2021 Mya Lawton PTA GP 2               Mya Lawton PTA  1/4/2021

## 2021-01-06 ENCOUNTER — APPOINTMENT (OUTPATIENT)
Dept: CARDIOLOGY | Facility: HOSPITAL | Age: 81
End: 2021-01-06

## 2021-01-06 LAB
ANION GAP SERPL CALCULATED.3IONS-SCNC: 11 MMOL/L (ref 5–15)
BH CV ECHO MEAS - ACS: 1.7 CM
BH CV ECHO MEAS - AI DEC SLOPE: 379.7 CM/SEC^2
BH CV ECHO MEAS - AI DEC TIME: 1.2 SEC
BH CV ECHO MEAS - AI MAX PG: 77.8 MMHG
BH CV ECHO MEAS - AI MAX VEL: 441 CM/SEC
BH CV ECHO MEAS - AI P1/2T: 340.1 MSEC
BH CV ECHO MEAS - AO MAX PG (FULL): 4.9 MMHG
BH CV ECHO MEAS - AO MAX PG: 10.6 MMHG
BH CV ECHO MEAS - AO MEAN PG (FULL): 2.7 MMHG
BH CV ECHO MEAS - AO MEAN PG: 6 MMHG
BH CV ECHO MEAS - AO ROOT AREA (BSA CORRECTED): 1.7
BH CV ECHO MEAS - AO ROOT AREA: 8 CM^2
BH CV ECHO MEAS - AO ROOT DIAM: 3.2 CM
BH CV ECHO MEAS - AO V2 MAX: 162.4 CM/SEC
BH CV ECHO MEAS - AO V2 MEAN: 115.5 CM/SEC
BH CV ECHO MEAS - AO V2 VTI: 34.3 CM
BH CV ECHO MEAS - AORTIC HR: 83.4 BPM
BH CV ECHO MEAS - AORTIC R-R: 0.72 SEC
BH CV ECHO MEAS - ASC AORTA: 2.3 CM
BH CV ECHO MEAS - AVA(I,A): 2.5 CM^2
BH CV ECHO MEAS - AVA(I,D): 2.5 CM^2
BH CV ECHO MEAS - AVA(V,A): 2.2 CM^2
BH CV ECHO MEAS - AVA(V,D): 2.2 CM^2
BH CV ECHO MEAS - BSA(HAYCOCK): 1.9 M^2
BH CV ECHO MEAS - BSA: 1.8 M^2
BH CV ECHO MEAS - BZI_BMI: 33.1 KILOGRAMS/M^2
BH CV ECHO MEAS - BZI_METRIC_HEIGHT: 157.5 CM
BH CV ECHO MEAS - BZI_METRIC_WEIGHT: 82.1 KG
BH CV ECHO MEAS - CI(AO): 12.6 L/MIN/M^2
BH CV ECHO MEAS - CI(LVOT): 3.8 L/MIN/M^2
BH CV ECHO MEAS - CO(AO): 23 L/MIN
BH CV ECHO MEAS - CO(LVOT): 7 L/MIN
BH CV ECHO MEAS - EDV(CUBED): 131.2 ML
BH CV ECHO MEAS - EDV(MOD-SP4): 71.6 ML
BH CV ECHO MEAS - EDV(TEICH): 122.8 ML
BH CV ECHO MEAS - EF(CUBED): 80.8 %
BH CV ECHO MEAS - EF(MOD-BP): 74 %
BH CV ECHO MEAS - EF(MOD-SP4): 73.8 %
BH CV ECHO MEAS - EF(TEICH): 73.1 %
BH CV ECHO MEAS - ESV(CUBED): 25.2 ML
BH CV ECHO MEAS - ESV(MOD-SP4): 18.7 ML
BH CV ECHO MEAS - ESV(TEICH): 33.1 ML
BH CV ECHO MEAS - FS: 42.3 %
BH CV ECHO MEAS - IVS/LVPW: 0.96
BH CV ECHO MEAS - IVSD: 0.89 CM
BH CV ECHO MEAS - LA DIMENSION(2D): 3.9 CM
BH CV ECHO MEAS - LV DIASTOLIC VOL/BSA (35-75): 39.1 ML/M^2
BH CV ECHO MEAS - LV MASS(C)D: 164.9 GRAMS
BH CV ECHO MEAS - LV MASS(C)DI: 90 GRAMS/M^2
BH CV ECHO MEAS - LV MAX PG: 5.7 MMHG
BH CV ECHO MEAS - LV MEAN PG: 3.2 MMHG
BH CV ECHO MEAS - LV SYSTOLIC VOL/BSA (12-30): 10.2 ML/M^2
BH CV ECHO MEAS - LV V1 MAX: 119.1 CM/SEC
BH CV ECHO MEAS - LV V1 MEAN: 84 CM/SEC
BH CV ECHO MEAS - LV V1 VTI: 27.8 CM
BH CV ECHO MEAS - LVIDD: 5.1 CM
BH CV ECHO MEAS - LVIDS: 2.9 CM
BH CV ECHO MEAS - LVOT AREA: 3 CM^2
BH CV ECHO MEAS - LVOT DIAM: 2 CM
BH CV ECHO MEAS - LVPWD: 0.93 CM
BH CV ECHO MEAS - MR MAX PG: 107.8 MMHG
BH CV ECHO MEAS - MR MAX VEL: 519.2 CM/SEC
BH CV ECHO MEAS - MV A MAX VEL: 117.7 CM/SEC
BH CV ECHO MEAS - MV DEC SLOPE: 900.2 CM/SEC^2
BH CV ECHO MEAS - MV DEC TIME: 0.16 SEC
BH CV ECHO MEAS - MV E MAX VEL: 148.1 CM/SEC
BH CV ECHO MEAS - MV E/A: 1.3
BH CV ECHO MEAS - MV MAX PG: 6.3 MMHG
BH CV ECHO MEAS - MV MEAN PG: 3.4 MMHG
BH CV ECHO MEAS - MV V2 MAX: 125.6 CM/SEC
BH CV ECHO MEAS - MV V2 MEAN: 87.9 CM/SEC
BH CV ECHO MEAS - MV V2 VTI: 32.8 CM
BH CV ECHO MEAS - MVA(VTI): 2.6 CM^2
BH CV ECHO MEAS - PA ACC TIME: 0.11 SEC
BH CV ECHO MEAS - PA MAX PG (FULL): 0.43 MMHG
BH CV ECHO MEAS - PA MAX PG: 3.2 MMHG
BH CV ECHO MEAS - PA MEAN PG (FULL): 0.66 MMHG
BH CV ECHO MEAS - PA MEAN PG: 2.2 MMHG
BH CV ECHO MEAS - PA PR(ACCEL): 27.6 MMHG
BH CV ECHO MEAS - PA V2 MAX: 90 CM/SEC
BH CV ECHO MEAS - PA V2 MEAN: 72.3 CM/SEC
BH CV ECHO MEAS - PA V2 VTI: 23.7 CM
BH CV ECHO MEAS - PI END-D VEL: 49.7 CM/SEC
BH CV ECHO MEAS - PI MAX PG: 22.4 MMHG
BH CV ECHO MEAS - PI MAX VEL: 236.6 CM/SEC
BH CV ECHO MEAS - PULM A REVS DUR: 0.09 SEC
BH CV ECHO MEAS - PULM A REVS VEL: 29.6 CM/SEC
BH CV ECHO MEAS - PULM DIAS VEL: 43.5 CM/SEC
BH CV ECHO MEAS - PULM S/D: 1.5
BH CV ECHO MEAS - PULM SYS VEL: 66.4 CM/SEC
BH CV ECHO MEAS - PVA(I,A): 2.7 CM^2
BH CV ECHO MEAS - PVA(I,D): 2.7 CM^2
BH CV ECHO MEAS - PVA(V,A): 3.1 CM^2
BH CV ECHO MEAS - PVA(V,D): 3.1 CM^2
BH CV ECHO MEAS - QP/QS: 0.75
BH CV ECHO MEAS - RAP SYSTOLE: 3 MMHG
BH CV ECHO MEAS - RV MAX PG: 2.8 MMHG
BH CV ECHO MEAS - RV MEAN PG: 1.5 MMHG
BH CV ECHO MEAS - RV V1 MAX: 83.8 CM/SEC
BH CV ECHO MEAS - RV V1 MEAN: 57.3 CM/SEC
BH CV ECHO MEAS - RV V1 VTI: 19 CM
BH CV ECHO MEAS - RVDD: 2.5 CM
BH CV ECHO MEAS - RVOT AREA: 3.3 CM^2
BH CV ECHO MEAS - RVOT DIAM: 2.1 CM
BH CV ECHO MEAS - RVSP: 33.7 MMHG
BH CV ECHO MEAS - SI(AO): 150.6 ML/M^2
BH CV ECHO MEAS - SI(CUBED): 57.9 ML/M^2
BH CV ECHO MEAS - SI(LVOT): 45.9 ML/M^2
BH CV ECHO MEAS - SI(MOD-SP4): 28.9 ML/M^2
BH CV ECHO MEAS - SI(TEICH): 49 ML/M^2
BH CV ECHO MEAS - SV(AO): 275.9 ML
BH CV ECHO MEAS - SV(CUBED): 106.1 ML
BH CV ECHO MEAS - SV(LVOT): 84.1 ML
BH CV ECHO MEAS - SV(MOD-SP4): 52.9 ML
BH CV ECHO MEAS - SV(RVOT): 62.9 ML
BH CV ECHO MEAS - SV(TEICH): 89.7 ML
BH CV ECHO MEAS - TR MAX VEL: 277.2 CM/SEC
BUN SERPL-MCNC: 9 MG/DL (ref 8–23)
BUN/CREAT SERPL: 10 (ref 7–25)
CALCIUM SPEC-SCNC: 9 MG/DL (ref 8.6–10.5)
CHLORIDE SERPL-SCNC: 102 MMOL/L (ref 98–107)
CO2 SERPL-SCNC: 25 MMOL/L (ref 22–29)
CREAT SERPL-MCNC: 0.9 MG/DL (ref 0.57–1)
DEPRECATED RDW RBC AUTO: 44.2 FL (ref 37–54)
ERYTHROCYTE [DISTWIDTH] IN BLOOD BY AUTOMATED COUNT: 15.3 % (ref 12.3–15.4)
GFR SERPL CREATININE-BSD FRML MDRD: 60 ML/MIN/1.73
GLUCOSE SERPL-MCNC: 191 MG/DL (ref 65–99)
HCT VFR BLD AUTO: 31.1 % (ref 34–46.6)
HGB BLD-MCNC: 10.1 G/DL (ref 12–15.9)
LV EF 2D ECHO EST: 65 %
MAGNESIUM SERPL-MCNC: 1.3 MG/DL (ref 1.6–2.4)
MCH RBC QN AUTO: 26.9 PG (ref 26.6–33)
MCHC RBC AUTO-ENTMCNC: 32.5 G/DL (ref 31.5–35.7)
MCV RBC AUTO: 83 FL (ref 79–97)
PLATELET # BLD AUTO: 178 10*3/MM3 (ref 140–450)
PMV BLD AUTO: 9.3 FL (ref 6–12)
POTASSIUM SERPL-SCNC: 3.3 MMOL/L (ref 3.5–5.2)
POTASSIUM SERPL-SCNC: 3.6 MMOL/L (ref 3.5–5.2)
RBC # BLD AUTO: 3.74 10*6/MM3 (ref 3.77–5.28)
SODIUM SERPL-SCNC: 138 MMOL/L (ref 136–145)
WBC # BLD AUTO: 12.8 10*3/MM3 (ref 3.4–10.8)

## 2021-01-06 PROCEDURE — 94799 UNLISTED PULMONARY SVC/PX: CPT

## 2021-01-06 PROCEDURE — 93306 TTE W/DOPPLER COMPLETE: CPT | Performed by: INTERNAL MEDICINE

## 2021-01-06 PROCEDURE — 83735 ASSAY OF MAGNESIUM: CPT | Performed by: INTERNAL MEDICINE

## 2021-01-06 PROCEDURE — 25010000002 MAGNESIUM SULFATE 2 GM/50ML SOLUTION: Performed by: INTERNAL MEDICINE

## 2021-01-06 PROCEDURE — 93306 TTE W/DOPPLER COMPLETE: CPT

## 2021-01-06 PROCEDURE — 25010000002 METOCLOPRAMIDE PER 10 MG: Performed by: SURGERY

## 2021-01-06 PROCEDURE — 99233 SBSQ HOSP IP/OBS HIGH 50: CPT | Performed by: INTERNAL MEDICINE

## 2021-01-06 PROCEDURE — 80048 BASIC METABOLIC PNL TOTAL CA: CPT | Performed by: INTERNAL MEDICINE

## 2021-01-06 PROCEDURE — 84132 ASSAY OF SERUM POTASSIUM: CPT | Performed by: INTERNAL MEDICINE

## 2021-01-06 PROCEDURE — 25010000002 METHYLNALTREXONE 12 MG/0.6ML SOLUTION: Performed by: SURGERY

## 2021-01-06 PROCEDURE — 25010000002 FUROSEMIDE PER 20 MG: Performed by: INTERNAL MEDICINE

## 2021-01-06 PROCEDURE — 25010000002 KETOROLAC TROMETHAMINE PER 15 MG: Performed by: SURGERY

## 2021-01-06 PROCEDURE — 85027 COMPLETE CBC AUTOMATED: CPT | Performed by: INTERNAL MEDICINE

## 2021-01-06 RX ORDER — POTASSIUM CHLORIDE 20 MEQ/1
40 TABLET, EXTENDED RELEASE ORAL AS NEEDED
Status: DISCONTINUED | OUTPATIENT
Start: 2021-01-06 | End: 2021-01-13 | Stop reason: HOSPADM

## 2021-01-06 RX ORDER — FUROSEMIDE 10 MG/ML
40 INJECTION INTRAMUSCULAR; INTRAVENOUS
Status: DISCONTINUED | OUTPATIENT
Start: 2021-01-06 | End: 2021-01-09

## 2021-01-06 RX ORDER — POTASSIUM CHLORIDE 1.5 G/1.77G
40 POWDER, FOR SOLUTION ORAL AS NEEDED
Status: DISCONTINUED | OUTPATIENT
Start: 2021-01-06 | End: 2021-01-13 | Stop reason: HOSPADM

## 2021-01-06 RX ORDER — MAGNESIUM SULFATE HEPTAHYDRATE 40 MG/ML
2 INJECTION, SOLUTION INTRAVENOUS AS NEEDED
Status: DISCONTINUED | OUTPATIENT
Start: 2021-01-06 | End: 2021-01-13 | Stop reason: HOSPADM

## 2021-01-06 RX ORDER — MAGNESIUM SULFATE HEPTAHYDRATE 40 MG/ML
4 INJECTION, SOLUTION INTRAVENOUS AS NEEDED
Status: DISCONTINUED | OUTPATIENT
Start: 2021-01-06 | End: 2021-01-13 | Stop reason: HOSPADM

## 2021-01-06 RX ORDER — POTASSIUM CHLORIDE 7.45 MG/ML
10 INJECTION INTRAVENOUS
Status: DISCONTINUED | OUTPATIENT
Start: 2021-01-06 | End: 2021-01-13 | Stop reason: HOSPADM

## 2021-01-06 RX ADMIN — METOCLOPRAMIDE HYDROCHLORIDE 10 MG: 5 INJECTION INTRAMUSCULAR; INTRAVENOUS at 05:21

## 2021-01-06 RX ADMIN — POTASSIUM CHLORIDE 40 MEQ: 1500 TABLET, EXTENDED RELEASE ORAL at 11:42

## 2021-01-06 RX ADMIN — GUAIFENESIN 600 MG: 600 TABLET, EXTENDED RELEASE ORAL at 07:54

## 2021-01-06 RX ADMIN — AMLODIPINE BESYLATE 10 MG: 5 TABLET ORAL at 11:13

## 2021-01-06 RX ADMIN — GUAIFENESIN 600 MG: 600 TABLET, EXTENDED RELEASE ORAL at 20:00

## 2021-01-06 RX ADMIN — Medication 10 ML: at 07:55

## 2021-01-06 RX ADMIN — POLYETHYLENE GLYCOL 3350 17 G: 17 POWDER, FOR SOLUTION ORAL at 07:54

## 2021-01-06 RX ADMIN — METOCLOPRAMIDE HYDROCHLORIDE 10 MG: 5 INJECTION INTRAMUSCULAR; INTRAVENOUS at 11:13

## 2021-01-06 RX ADMIN — DOCUSATE SODIUM 100 MG: 100 CAPSULE, LIQUID FILLED ORAL at 07:55

## 2021-01-06 RX ADMIN — IPRATROPIUM BROMIDE AND ALBUTEROL SULFATE 3 ML: 2.5; .5 SOLUTION RESPIRATORY (INHALATION) at 15:40

## 2021-01-06 RX ADMIN — FUROSEMIDE 40 MG: 10 INJECTION, SOLUTION INTRAMUSCULAR; INTRAVENOUS at 17:26

## 2021-01-06 RX ADMIN — IPRATROPIUM BROMIDE AND ALBUTEROL SULFATE 3 ML: 2.5; .5 SOLUTION RESPIRATORY (INHALATION) at 19:03

## 2021-01-06 RX ADMIN — KETOROLAC TROMETHAMINE 15 MG: 30 INJECTION, SOLUTION INTRAMUSCULAR at 07:53

## 2021-01-06 RX ADMIN — Medication 10 ML: at 20:00

## 2021-01-06 RX ADMIN — MAGNESIUM SULFATE HEPTAHYDRATE 2 G: 40 INJECTION, SOLUTION INTRAVENOUS at 13:59

## 2021-01-06 RX ADMIN — Medication 10 ML: at 05:21

## 2021-01-06 RX ADMIN — ROSUVASTATIN CALCIUM 10 MG: 10 TABLET, FILM COATED ORAL at 20:00

## 2021-01-06 RX ADMIN — IPRATROPIUM BROMIDE AND ALBUTEROL SULFATE 3 ML: 2.5; .5 SOLUTION RESPIRATORY (INHALATION) at 03:24

## 2021-01-06 RX ADMIN — IPRATROPIUM BROMIDE AND ALBUTEROL SULFATE 3 ML: 2.5; .5 SOLUTION RESPIRATORY (INHALATION) at 07:00

## 2021-01-06 RX ADMIN — PANTOPRAZOLE SODIUM 40 MG: 40 TABLET, DELAYED RELEASE ORAL at 05:21

## 2021-01-06 RX ADMIN — LEVOTHYROXINE SODIUM 100 MCG: 125 TABLET ORAL at 07:58

## 2021-01-06 RX ADMIN — FUROSEMIDE 40 MG: 10 INJECTION, SOLUTION INTRAMUSCULAR; INTRAVENOUS at 11:12

## 2021-01-06 RX ADMIN — METOCLOPRAMIDE HYDROCHLORIDE 10 MG: 5 INJECTION INTRAMUSCULAR; INTRAVENOUS at 17:26

## 2021-01-06 RX ADMIN — BUDESONIDE AND FORMOTEROL FUMARATE DIHYDRATE 2 PUFF: 160; 4.5 AEROSOL RESPIRATORY (INHALATION) at 19:03

## 2021-01-06 RX ADMIN — METHYLNALTREXONE BROMIDE 6 MG: 12 INJECTION, SOLUTION SUBCUTANEOUS at 07:54

## 2021-01-06 RX ADMIN — MAGNESIUM SULFATE HEPTAHYDRATE 2 G: 40 INJECTION, SOLUTION INTRAVENOUS at 12:01

## 2021-01-06 RX ADMIN — KETOROLAC TROMETHAMINE 15 MG: 30 INJECTION, SOLUTION INTRAMUSCULAR at 21:26

## 2021-01-06 RX ADMIN — DOCUSATE SODIUM 100 MG: 100 CAPSULE, LIQUID FILLED ORAL at 20:00

## 2021-01-06 RX ADMIN — BUDESONIDE AND FORMOTEROL FUMARATE DIHYDRATE 2 PUFF: 160; 4.5 AEROSOL RESPIRATORY (INHALATION) at 07:00

## 2021-01-06 RX ADMIN — RISPERIDONE 0.25 MG: 0.5 TABLET, ORALLY DISINTEGRATING ORAL at 23:25

## 2021-01-06 NOTE — PLAN OF CARE
Goal Outcome Evaluation:  Plan of Care Reviewed With: patient, daughter  Progress: improving  Patient daughter remains at bedside. Patient was given Risperidone 0.25mg for anxiety. Patient O2 SATs desated to 82% at 2246. RT increased PF sittings to 40/100 and 15L non-rebreather. Non-rebreather was taken off of patient at 2353. Patient was given Ketorolac 15mg IV for pain. Patient seemed to of rested somewhat well. Will continue to monitor.

## 2021-01-06 NOTE — PROGRESS NOTES
Continued Stay Note   [place]     Patient Name: Diane Guan  MRN: [id number]  Today's Date: 1/6/2021    Admit Date: 12/30/2020    Discharge Plan     Row Name 01/06/21 1136       Plan    Plan  D/C Plan : Home with spouse . PT is recommending H/H and pt is refusing . Pt has home o2 .    Plan Comments  Barrier to D/C : Pt is POD 7 of a hernia repair and is on Full liqs. Pt remains on 40 l of o2        Discharge Codes    No documentation.       Expected Discharge Date and Time     Expected Discharge Date Expected Discharge Time    Jan 11, 2021             Breana Sterling RN

## 2021-01-06 NOTE — PLAN OF CARE
Pt stable throughout shift. O2 titrated down to 30L @ 50% after admin of Lasix. Pt feeling much better. Will cont to monitor.            Problem: Adult Inpatient Plan of Care  Goal: Plan of Care Review  Outcome: Ongoing, Progressing  Goal: Patient-Specific Goal (Individualized)  Outcome: Ongoing, Progressing  Goal: Absence of Hospital-Acquired Illness or Injury  Outcome: Ongoing, Progressing  Intervention: Identify and Manage Fall Risk  Recent Flowsheet Documentation  Taken 1/6/2021 1400 by Isabella Padilla RN  Safety Promotion/Fall Prevention:   safety round/check completed   room organization consistent   nonskid shoes/slippers when out of bed   assistive device/personal items within reach   clutter free environment maintained  Taken 1/6/2021 1200 by Isabella Padilla RN  Safety Promotion/Fall Prevention:   safety round/check completed   room organization consistent   assistive device/personal items within reach   clutter free environment maintained  Taken 1/6/2021 1005 by Isabella Padilla RN  Safety Promotion/Fall Prevention:   safety round/check completed   room organization consistent   assistive device/personal items within reach  Taken 1/6/2021 0800 by Isabella Padilla RN  Safety Promotion/Fall Prevention:   safety round/check completed   room organization consistent   assistive device/personal items within reach   clutter free environment maintained  Intervention: Prevent Skin Injury  Recent Flowsheet Documentation  Taken 1/6/2021 0800 by Isabella Padilla RN  Body Position: position changed independently  Intervention: Prevent Infection  Recent Flowsheet Documentation  Taken 1/6/2021 1400 by Isabella Padilla RN  Infection Prevention: personal protective equipment utilized  Taken 1/6/2021 1005 by Isabella Padilla RN  Infection Prevention: personal protective equipment utilized  Taken 1/6/2021 0800 by Isabella Padilla RN  Infection Prevention: personal protective equipment utilized  Goal: Optimal Comfort and  Wellbeing  Outcome: Ongoing, Progressing  Intervention: Provide Person-Centered Care  Recent Flowsheet Documentation  Taken 1/6/2021 0800 by Isabella Padilla RN  Trust Relationship/Rapport:   care explained   emotional support provided   empathic listening provided  Goal: Readiness for Transition of Care  Outcome: Ongoing, Progressing     Problem: Pain Acute  Goal: Optimal Pain Control  Outcome: Ongoing, Progressing  Intervention: Prevent or Manage Pain  Recent Flowsheet Documentation  Taken 1/6/2021 0800 by Isabella Padilla RN  Sleep/Rest Enhancement: family presence promoted  Intervention: Optimize Psychosocial Wellbeing  Recent Flowsheet Documentation  Taken 1/6/2021 1200 by Isabella Padilla RN  Supportive Measures: active listening utilized  Taken 1/6/2021 0800 by Isabella Padilla RN  Diversional Activities: television     Problem: Bleeding (Surgery Nonspecified)  Goal: Absence of Bleeding  Outcome: Ongoing, Progressing     Problem: Bowel Elimination Impaired (Surgery Nonspecified)  Goal: Effective Bowel Elimination  Outcome: Ongoing, Progressing     Problem: Infection (Surgery Nonspecified)  Goal: Absence of Infection Signs and Symptoms  Outcome: Ongoing, Progressing     Problem: Ongoing Anesthesia Effects (Surgery Nonspecified)  Goal: Anesthesia/Sedation Recovery  Outcome: Ongoing, Progressing  Intervention: Optimize Anesthesia Recovery  Recent Flowsheet Documentation  Taken 1/6/2021 1400 by Isabella Padilla RN  Patient Tolerance (IS): good  Safety Promotion/Fall Prevention:   safety round/check completed   room organization consistent   nonskid shoes/slippers when out of bed   assistive device/personal items within reach   clutter free environment maintained  Taken 1/6/2021 1200 by Isabella Padilla, RN  $ Incentive Spirometry: yes  Patient Tolerance (IS): good  Safety Promotion/Fall Prevention:   safety round/check completed   room organization consistent   assistive device/personal items within reach   clutter free  environment maintained  Administration (IS): instruction provided, follow-up  Level Incentive Spirometer (mL): 750  Number of Repetitions (IS): 10  Taken 1/6/2021 1005 by Isabella Padilla RN  Safety Promotion/Fall Prevention:   safety round/check completed   room organization consistent   assistive device/personal items within reach  Taken 1/6/2021 0800 by Isabella Padilla RN  $ Incentive Spirometry: yes  Patient Tolerance (IS): good  Safety Promotion/Fall Prevention:   safety round/check completed   room organization consistent   assistive device/personal items within reach   clutter free environment maintained  Administration (IS): instruction provided, follow-up  Level Incentive Spirometer (mL): 750  Number of Repetitions (IS): 10     Problem: Pain (Surgery Nonspecified)  Goal: Acceptable Pain Control  Outcome: Ongoing, Progressing  Intervention: Prevent or Manage Pain  Recent Flowsheet Documentation  Taken 1/6/2021 0800 by Isabella Padilla RN  Diversional Activities: television     Problem: Postoperative Nausea and Vomiting (Surgery Nonspecified)  Goal: Nausea and Vomiting Relief  Outcome: Ongoing, Progressing     Problem: Postoperative Urinary Retention (Surgery Nonspecified)  Goal: Effective Urinary Elimination  Outcome: Ongoing, Progressing     Problem: Fall Injury Risk  Goal: Absence of Fall and Fall-Related Injury  Outcome: Ongoing, Progressing  Intervention: Identify and Manage Contributors to Fall Injury Risk  Recent Flowsheet Documentation  Taken 1/6/2021 1200 by Isabella Padilla RN  Medication Review/Management: medications reviewed  Intervention: Promote Injury-Free Environment  Recent Flowsheet Documentation  Taken 1/6/2021 1400 by Isabella Padilla RN  Safety Promotion/Fall Prevention:   safety round/check completed   room organization consistent   nonskid shoes/slippers when out of bed   assistive device/personal items within reach   clutter free environment maintained  Taken 1/6/2021 1200 by Randy  LUCINA Mason  Safety Promotion/Fall Prevention:   safety round/check completed   room organization consistent   assistive device/personal items within reach   clutter free environment maintained  Taken 1/6/2021 1005 by Isabella Padilla RN  Safety Promotion/Fall Prevention:   safety round/check completed   room organization consistent   assistive device/personal items within reach  Taken 1/6/2021 0800 by Isabella Padilla, RN  Safety Promotion/Fall Prevention:   safety round/check completed   room organization consistent   assistive device/personal items within reach   clutter free environment maintained     Problem: Skin Injury Risk Increased  Goal: Skin Health and Integrity  Outcome: Ongoing, Progressing   Goal Outcome Evaluation:  Plan of Care Reviewed With: patient, daughter  Progress: improving

## 2021-01-06 NOTE — PROGRESS NOTES
"Nutrition Services    Patient Name: Diane Guan  YOB: 1940  MRN: [id number]  Admission date: 12/30/2020    PPE Documentation        PPE Worn By Provider N/A Did not enter room    PPE Worn By Patient  N/A      PROGRESS NOTE      Encounter Information: Progress note to monitor diet progression. Postop day #7 laparoscopic hiatal hernia repair with gastropexy for giant hiatal hernia with pulmonary compromise. Per Dr. Plummer \"Needs 10 days of full liquids before moving up to soft foods.\"     Today makes Day #6 NPO/Liquid diet status, however today makes day only #2 of FLD.        PO Diet: Diet Liquids; Full Liquid   PO Supplements: Boost Plus TID    PO Intake:  100% of full liquids        Nutrition support orders: -    Nutrition support review: -       Labs (reviewed below): Hypokalemia- prn replacement given   Hypomagnesemia- prn replacement         GI Function:  + BM on 1/6        Nutrition Intervention: Continue with Boost Plus TID & will continue to closely monitor        Results from last 7 days   Lab Units 01/06/21  1230 01/06/21  0907 01/04/21  0316 01/03/21  0652   SODIUM mmol/L  --  138 137 137   POTASSIUM mmol/L 3.6 3.3* 4.0 4.4   CHLORIDE mmol/L  --  102 103 101   CO2 mmol/L  --  25.0 23.0 24.0   BUN mg/dL  --  9 23 23   CREATININE mg/dL  --  0.90 1.03* 1.02*   CALCIUM mg/dL  --  9.0 9.0 9.2   GLUCOSE mg/dL  --  191* 99 88     Results from last 7 days   Lab Units 01/06/21  0907   MAGNESIUM mg/dL 1.3*   HEMOGLOBIN g/dL 10.1*   HEMATOCRIT % 31.1*     COVID19   Date Value Ref Range Status   12/28/2020 Not Detected Not Detected - Ref. Range Final     Lab Results   Component Value Date    HGBA1C 6.1 (H) 08/18/2020       RD to follow up per protocol.    Electronically signed by:  Estefania Barba RD  01/06/21 15:14 EST  "

## 2021-01-06 NOTE — PAYOR COMM NOTE
"Continued clinical for case# [id number]    POD#7 s/p hernia repair  Pt c/o SOA - CXR showing vascular congestion  Pt requiring oxygen at 90% 40L to maintain sats.   IV lasix BID   ----------  [place]:  Respiratory Failure GRG (PG-RF)  Hospital admission is needed for appropriate care of the patient because of acute respiratory failure or insufficiency as indicated by 1 or more of the following   Severe respiratory distress as indicated by 1 or more of the following:  Severe hypoxemia (partial pressure of oxygen less than 50 mm Hg (6.7 kPa) on greater than 50% oxygen  -----------  CONTINUED STAY AUTHORIZATION PENDING:   PLEASE FAX OR CALL DETERMINATION TO CONTACT BELOW:       THANK YOU,    DENNIS Zavala, RN  Utilization Review  [facility]  Phone: [phone]  Fax: [phone]      NPI: [id number]  Tax ID: [id number]        Diane Guan (80 y.o. Female)     Date of Birth Social Security Number Address Home Phone MRN    1940  [address] [place] [facility] [phone] [id number]    Roman Catholic Marital Status          Non-Hindu        Admission Date Admission Type Admitting Provider Attending Provider Department, Room/Bed    12/30/20 Elective Den Plummer DO Clayton, John D, DO [facility] PROGRESS CARE, 2105/1    Discharge Date Discharge Disposition Discharge Destination                       Attending Provider: Den Plummer DO    Allergies: No Known Allergies    Isolation: None   Infection: None   Code Status: CPR    Ht: 157.5 cm (62\")   Wt: 82.5 kg (181 lb 14.1 oz)    Admission Cmt: None   Principal Problem: Hiatal hernia with gastroesophageal reflux [K21.9,K44.9]                 Active Insurance as of 12/30/2020     Primary Coverage     Payor Plan Insurance Group Employer/Plan Group    ANTHEM MEDICARE REPLACEMENT ANTHEM MEDICARE ADVANTAGE [id number]     Payor Plan Address Payor Plan Phone Number Payor Plan Fax Number Effective Dates    "  [id number] [phone]  1/1/2019 - None Entered    [facility] [id number]       Subscriber Name Subscriber Birth Date Member ID       LILI APPLE 1940 [id number]                 Emergency Contacts      (Rel.) Home Phone Work Phone Mobile Phone    CHERYL SLAUGHTER (Daughter) [phone] -- --    KOFI APPLE (Spouse) -- -- [phone]            Oxygen Therapy (last day)     Date/Time   SpO2   Device (Oxygen Therapy)   Flow (L/min)   Oxygen Concentration (%)   ETCO2 (mmHg)    01/06/21 1331   --   heated;humidified;high-flow nasal cannula   25   50   --    01/06/21 1112   98   high-flow nasal cannula;humidified;heated   40   90   --    01/06/21 1022   --   high-flow nasal cannula   40   90   --    01/06/21 0800   --   high-flow nasal cannula;humidified   40   90   --    01/06/21 0703   95   --   --   --   --    01/06/21 0700   95   high-flow nasal cannula;humidified;heated   40   90   --    01/06/21 0536   96   heated;humidified;high-flow nasal cannula   40   90   --    01/06/21 0400   --   --   40   90   --    01/06/21 0327   92   NPPV/NIV   40   90   --    01/06/21 0320   96   NPPV/NIV   40   90   --    01/05/21 2353   --   heated;high-flow nasal cannula;humidified   40   100   --    01/05/21 2301   90   heated;high-flow nasal cannula   40   100   --    01/05/21 2256   92   heated;high-flow nasal cannula   40   100   --    01/05/21 2246   93   heated;high-flow nasal cannula;humidified;nasal cannula;nonrebreather mask   40   100   --    01/05/21 2043   --   heated;high-flow nasal cannula;humidified   35   90   --    01/05/21 1923   96   heated;high-flow nasal cannula   35   90   --    01/05/21 1917   92   heated;high-flow nasal cannula   35   90   --    01/05/21 1730   --   heated;humidified;high-flow nasal cannula   35   90   --    01/05/21 1650   --   heated;humidified;high-flow nasal cannula   40   90   --    01/05/21 1600   --   heated;humidified;high-flow nasal cannula   40   90    --    01/05/21 1450   98   --   --   --   --    01/05/21 1446   98   heated;humidified;high-flow nasal cannula   40   90   --    01/05/21 1411   --   heated;high-flow nasal cannula;humidified   40   90   --    01/05/21 1200   --   heated;humidified;high-flow nasal cannula   40   90   --    01/05/21 1126   94   --   --   --   --    01/05/21 1121   94   heated;humidified;high-flow nasal cannula   40   90   --    01/05/21 0845   --   heated;humidified;high-flow nasal cannula   40   90   --    01/05/21 0725   --   heated;humidified;high-flow nasal cannula   40   100   --    01/05/21 0714   91   --   --   --   --    01/05/21 0707   91   heated;humidified;high-flow nasal cannula   40   100   --    01/05/21 0620   --   heated;high-flow nasal cannula   40   100   --    01/05/21 0400   --   heated;high-flow nasal cannula;humidified;nonrebreather mask   40   100   --    01/05/21 0302   91   heated;high-flow nasal cannula   40   100   --    01/05/21 0257   91   heated;high-flow nasal cannula   40   100   --    01/05/21 0246   --   heated;high-flow nasal cannula   40   100   --    01/05/21 0034   91   heated;high-flow nasal cannula   40   100   --                 Physician Progress Notes (last 24 hours) (Notes from 01/05/21 1339 through 01/06/21 1339)      Meg Lund DO at 01/06/21 1136                [facility] Medicine Services Daily Progress Note      Hospitalist Team  LOS 6 days      Patient Care Team:  Jemima Fontaine MD as PCP - General (Family Medicine)    Patient Location: 2105/1      Subjective   Subjective     Chief Complaint / Subjective  No chief complaint on file.  follow up shortness of breath       Brief Synopsis of Hospital Course/HPI     80-year-old female who was having issues with GERD was found to have hiatal hernia and is underwent fixation per general surgery.  She has been admitted postoperatively medicine has been consulted to help with her medical problems.  Her blood  pressure stable she is having some apnea spells from the sedation but overall her breathing is stable.  No other issues.  As reported she is slightly sedated from the procedure and HPI was slightly limited.         Date::    12/31/20: had worsening respiratory status last night and has been on HFNC this am. Refusing to wear BIPAP although likely needs it fo LIBBY.    1/1/20: on BIPAP this am and thus HPI limited. Events of overnight noted was moved to higher level of care. O2 is stable today. Seems O2 worse when not deep breathing and when sleeping as has LIBBY.     1/2/21: no real improvement. Having issues with pain medication and sedation not allowing pulmonary toilet. Will not wear BIPAP    1/3/21: Slight improvement today.  Per the nurse she had been using her incentive spirometer and been more ambulatory.  Encourage patient with pulmonology at bedside to continue to increase activity    1/4/2021: Patient seen and examined this morning.  Remains short of breath and on high flow.  Encouraged to ambulate more and use examination amatory, patient agrees.  No other complaints or acute events overnight per nursing.    1/5/2021: Patient seen this morning.  Still the same, shortness of breath the same.  Remains on high flow.  No other complaints.    1/6/2021: Patient seen and examined this morning.  Feeling slightly better but not significantly improved.  Remains on high flow.  Chest x-ray noted to have bilateral congestion, started on Lasix, echo ordered.    Denies fever, chills, chest pain, nausea, vomiting, diarrhea, dysuria, or dizziness.    Review of Systems   All other systems reviewed and are negative.        Objective   Objective      Vital Signs  Temp:  [97.9 °F (36.6 °C)-98.5 °F (36.9 °C)] 98.2 °F (36.8 °C)  Heart Rate:  [] 89  Resp:  [17-20] 17  BP: (133-146)/(50-60) 135/51  Oxygen Therapy  SpO2: 98 %  Pulse Oximetry Type: Continuous  Device (Oxygen Therapy): high-flow nasal cannula, humidified, heated  $  "High Flow Nasal Cannula Set-Up: yes  Flow (L/min): 40  Oxygen Concentration (%): 90  Flowsheet Rows      First Filed Value   Admission Height  157.5 cm (62\") Documented at 12/16/2020 1444   Admission Weight  81.6 kg (180 lb) Documented at 12/16/2020 1444        Intake & Output (last 3 days)       01/03 0701 - 01/04 0700 01/04 0701 - 01/05 0700 01/05 0701 - 01/06 0700 01/06 0701 - 01/07 0700    P.O. 240 600 360     I.V. (mL/kg)        IV Piggyback        Total Intake(mL/kg) 240 (2.9) 600 (7.2) 360 (4.4)     Urine (mL/kg/hr) 100 (0)  125 (0.1)     Stool   0     Total Output 100  125     Net +140 +600 +235             Urine Unmeasured Occurrence 3 x 1 x 4 x 2 x    Stool Unmeasured Occurrence 2 x 1 x 1 x 1 x        Lines, Drains & Airways    Active LDAs     Name:   Placement date:   Placement time:   Site:   Days:    Peripheral IV 12/31/20 0938 Anterior;Left Forearm   12/31/20 0938    Forearm   less than 1                  Physical Exam:    General: Awake, alert, elderly female, NAD  Eyes: PERRL, EOMI, conjunctive are clear  Cardiovascular: Regular rate and rhythm, no murmurs  Respiratory: Decreased breath sounds bilaterally, no wheezing or rales, unlabored breathing  Abdomen: Soft, tender around incision sites, positive bowel sounds, no guarding  Neurologic: A&O, CN grossly intact, moves all extremities spontaneously  Musculoskeletal: Normal range of motion, no deformities  Skin: Warm, dry, intact         Wounds (last 24 hours)      LDA Wound     Row Name 01/06/21 0800 01/05/21 2043 01/05/21 1600       Wound 12/30/20 1037 abdomen Incision    Wound - Properties Group Placement Date: 12/30/20  -AM Placement Time: 1037  -AM Location: abdomen  -AM Primary Wound Type: Incision  -AM    Dressing Appearance  dry;intact;open to air  -HV  --  --    Closure  BRIANNE  -HV  BRIANNE  -SE  BRIANNE  -SH    Base  dressing in place, unable to visualize  -HV  dressing in place, unable to visualize  -SE  dressing in place, unable to visualize  -SH "    Periwound  intact;dry  -HV  --  --    Retired Wound - Properties Group Date first assessed: 12/30/20  -AM Time first assessed: 1037  -AM Location: abdomen  -AM Primary Wound Type: Incision  -AM    Row Name 01/05/21 1200             Wound 12/30/20 1037 abdomen Incision    Wound - Properties Group Placement Date: 12/30/20  -AM Placement Time: 1037  -AM Location: abdomen  -AM Primary Wound Type: Incision  -AM    Closure  BRIANNE  -SH      Base  dressing in place, unable to visualize  -      Retired Wound - Properties Group Date first assessed: 12/30/20  -AM Time first assessed: 1037  -AM Location: abdomen  -AM Primary Wound Type: Incision  -AM      User Key  (r) = Recorded By, (t) = Taken By, (c) = Cosigned By    Initials Name Provider Type    AM Ping Martinez, RN Registered Nurse    Lisa Alfonso, RN Registered Nurse    Roshni Hanna, RN Registered Nurse    Isabella Mariee, RN Registered Nurse          Procedures:    Procedure(s):  Laparoscopic hiatal hernia repair with gastropexy          Results Review:     I reviewed the patient's new clinical results.      Lab Results (last 24 hours)     Procedure Component Value Units Date/Time    Magnesium [[id number]]  (Abnormal) Collected: 01/06/21 0907    Specimen: Blood Updated: 01/06/21 1118     Magnesium 1.3 mg/dL     Basic Metabolic Panel [[id number]]  (Abnormal) Collected: 01/06/21 0907    Specimen: Blood Updated: 01/06/21 1015     Glucose 191 mg/dL      BUN 9 mg/dL      Creatinine 0.90 mg/dL      Sodium 138 mmol/L      Potassium 3.3 mmol/L      Chloride 102 mmol/L      CO2 25.0 mmol/L      Calcium 9.0 mg/dL      eGFR Non African Amer 60 mL/min/1.73      BUN/Creatinine Ratio 10.0     Anion Gap 11.0 mmol/L     Narrative:      GFR Normal >60  Chronic Kidney Disease <60  Kidney Failure <15      CBC (No Diff) [[id number]]  (Abnormal) Collected: 01/06/21 0907    Specimen: Blood Updated: 01/06/21 0946     WBC 12.80 10*3/mm3      RBC 3.74 10*6/mm3       Hemoglobin 10.1 g/dL      Hematocrit 31.1 %      MCV 83.0 fL      MCH 26.9 pg      MCHC 32.5 g/dL      RDW 15.3 %      RDW-SD 44.2 fl      MPV 9.3 fL      Platelets 178 10*3/mm3         No results found for: HGBA1C        Results from last 7 days   Lab Units 01/02/21  1711   PH, ARTERIAL pH units 7.353   PO2 ART mm Hg 72.0*   PCO2, ARTERIAL mm Hg 48.2*   HCO3 ART mmol/L 26.8     No results found for: LIPASE  Lab Results   Component Value Date    CHOL 374 (H) 01/03/2020    TRIG 311 (H) 01/03/2020    HDL 50 01/03/2020     (H) 01/03/2020       Lab Results   Lab Value Date/Time    FINALDX  12/31/2020 0205     Leukocytosis with absolute atypical lymphocytosis  Anemia  No blasts identified  If CBC indices persist/worsen, consider flow cytometry or other studies as clinically indicated to exclude a lymphoproliferative disorder      FINALDX  02/06/2020 1405     Absolute atypical lymphocytosis.  Anemia.  No blasts identified.  Recommend clinical follow-up and additional studies to include flow cytometry as clinically indicated to exclude CLL/SLL or other lymphoproliferative disorder.         Microbiology Results (last 10 days)     Procedure Component Value - Date/Time    COVID-19,APTIMA PANTHER,LESLEE IN-HOUSE, NP/OP SWAB IN UTM/VTM/SALINE TRANSPORT MEDIA,24 HR TAT - Swab, Nasopharynx [[id number]]  (Normal) Collected: 12/28/20 1227    Lab Status: Final result Specimen: Swab from Nasopharynx Updated: 12/28/20 2046     COVID19 Not Detected    Narrative:      Fact sheet for providers: https://www.fda.gov/media/017779/download     Fact sheet for patients: https://www.fda.gov/media/907384/download    Test performed by PCR.          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 Persistent bibasilar airspace opacities atelectasis versus pneumonia. Small bilateral pleural effusions.  Electronically Signed By-Muriel Spencer MD On:1/5/2021 10:10 PM This report was finalized on [id number] by  Muriel Spencer MD.    Xr Chest 1 View    Result Date: 1/2/2021    1.  Low volume inspiration with persistent left lower lobe airspace consolidation which is unchanged. 2.  Small bilateral pleural effusions.   Electronically Signed By-Jan Vernon MD On:1/2/2021 11:39 AM This report was finalized on [id number] by  Jan Vernon MD.    Xr Chest 1 View    Result Date: 12/31/2020   1. Consolidation in the left lung base which appears new. 2. Lower lung volumes with right basilar atelectasis. 3. Cardiomegaly without acute cardiac decompensation. 4. Right basilar atelectasis.  Electronically Signed By-Den Connell MD On:12/31/2020 9:10 AM This report was finalized on [id number] by  Den Connell MD.          Xrays, labs reviewed personally by physician.    Medication Review:   I have reviewed the patient's current medication list      Scheduled Meds  amLODIPine, 10 mg, Oral, Daily With Lunch  budesonide-formoterol, 2 puff, Inhalation, BID - RT  docusate sodium, 100 mg, Oral, BID  furosemide, 40 mg, Intravenous, BID  guaiFENesin, 600 mg, Oral, Q12H  ipratropium-albuterol, 3 mL, Nebulization, Q4H - RT  levothyroxine, 100 mcg, Oral, QAM AC  methylnaltrexone, 6 mg, Subcutaneous, Every Other Day  metoclopramide,  10 mg, Intravenous, Q6H  pantoprazole, 40 mg, Oral, Q AM  polyethylene glycol, 17 g, Oral, Daily  rosuvastatin, 10 mg, Oral, Nightly  sodium chloride, 10 mL, Intravenous, Q12H        Meds Infusions  lactated ringers, 9 mL/hr, Last Rate: Stopped (12/30/20 2009)  phenylephrine, 0.5-3 mcg/kg/min        Meds PRN  •  acetaminophen **OR** acetaminophen  •  influenza vaccine  •  ketorolac  •  lactated ringers  •  magnesium sulfate **OR** magnesium sulfate **OR** magnesium sulfate  •  Morphine  •  [DISCONTINUED] HYDROmorphone **AND** naloxone  •  [DISCONTINUED] Morphine **AND** naloxone  •  ondansetron **OR** ondansetron  •  oxyCODONE  •  phenylephrine  •  potassium chloride **OR** potassium chloride **OR** potassium chloride  •  promethazine **OR** promethazine  •  risperiDONE  •  saline  •  sodium chloride        Assessment/Plan   Assessment/Plan     Active Hospital Problems    Diagnosis  POA   • **Hiatal hernia with gastroesophageal reflux [K21.9, K44.9]  Unknown   • Acute respiratory failure with hypoxia and hypercapnia (CMS/HCC) [J96.01, J96.02]  Unknown   • Abdominal pain [R10.9]  Unknown   • Nocturnal hypoxia [G47.34]  Yes   • LIBBY (obstructive sleep apnea) [G47.33]  Unknown      Resolved Hospital Problems   No resolved problems to display.       MEDICAL DECISION MAKING COMPLEXITY BY PROBLEM:     Acute on chronic hypoxic respiratory failure, LIBBY  LLL PNA  -likely realted to narcotics and LIBBY vs LLL CAP  -CXR 12/31/20: new LLL infiltrate  -CT chest reviewed b/l LL atelectasis vs infiltrate  -Remains on high flow, wean off as tolerated  -compliant with AVAP  -Nebulizers, symbicort  -Finished Zosyn  -encourage aggressive pulmonary toilet  -Repeat chest x-ray on 1/6 shows bilateral congestion and atelectasis  -Start on Lasix IV twice daily, monitor I's and O's  -Echo ordered to evaluate EF  -pulmonary following    Hiatal hernia with GERD   Possible postoperative ileus  -S/p laparoscopic hiatal hernia repair with  gastropexy  -General surgery primary postop per them  -PPI, carafate, reglan prn  -Diet and bowel regimen per surgery      Hypertension  -Continue home meds  -Monitor and adjust blood pressure prn    CLL  -WBC chronically elevated,(baseline 15-20 range)  -Follow CBC while here    DVT prophylaxis  -Per primary      VTE Prophylaxis -   Mechanical Order History:      Ordered        20  Place Sequential Compression Device  Once         20 1513  Place Sequential Compression Device  Once         20  Maintain Sequential Compression Device  Continuous                 Pharmalogical Order History:     None            Code Status -   Code Status and Medical Interventions:   Ordered at: 20 1513     Code Status:    CPR     Medical Interventions (Level of Support Prior to Arrest):    Full       This patient has been examined wearing appropriate Personal Protective Equipment. 21        Discharge Planning  Respiratory status will have to improve before d/c         Electronically signed by Meg Lund DO, 21, 11:36 EST.  [facility] Hospitalist Team          Electronically signed by Meg Lund DO at 21 1137     Wily Rutherford MD at 21        PULMONARY CRITICAL CARE Progress  NOTE      PATIENT IDENTIFICATION:  Name: Diane Guan  MRN: [id number]  :  1940     Age: 80 y.o.  Sex: female    DATE OF Note:  2021   Referring Physician: Den Plummer DO                  Subjective:   Much better but still requiring high oxygen's flow  Improved her cough  No chest pain, no nausea or vomiting, no change in bowel habit, no dysuria,  no new  skin rash or itching.      Objective:  tMax 24 hrs: Temp (24hrs), Av.2 °F (36.8 °C), Min:97.8 °F (36.6 °C), Max:98.5 °F (36.9 °C)      Vitals Ranges:   Temp:  [97.8 °F (36.6 °C)-98.5 °F (36.9 °C)] 98.5 °F (36.9 °C)  Heart Rate:  [] 92  Resp:  [16-20] 18  BP: (130-133)/(50-55) 133/55    Intake and Output  "Last 3 Shifts:   I/O last 3 completed shifts:  In: 960 [P.O.:960]  Out: 75 [Urine:75]    Exam:  /55 (BP Location: Left arm, Patient Position: Sitting)   Pulse 92   Temp 98.5 °F (36.9 °C) (Oral)   Resp 18   Ht 157.5 cm (62\")   Wt 83.9 kg (184 lb 15.5 oz)   SpO2 96%   BMI 33.83 kg/m²     General Appearance:   Alert awake  HEENT:  Normocephalic, without obvious abnormality, Conjunctiva/corneas clear,.  Normal external ear canals, Nares normal, no drainage     Neck:  Supple, symmetrical, trachea midline. No JVD.  Lungs /Chest wall:   Bilateral basal rhonchi, respirations unlabored symmetrical wall movement.     Heart:  Regular rate and rhythm, systolic murmur PMI left sternal border  Abdomen: Soft, non-tender, no masses, no organomegaly.    Extremities: Trace edema no clubbing or Cyanosis        Medications:    Current Facility-Administered Medications:   •  acetaminophen (TYLENOL) tablet 650 mg, 650 mg, Oral, Q4H PRN, 650 mg at 01/03/21 1338 **OR** acetaminophen (TYLENOL) suppository 650 mg, 650 mg, Rectal, Q4H PRN, Den Plummer DO  •  amLODIPine (NORVASC) tablet 10 mg, 10 mg, Oral, Daily With Lunch, Bob Lewis DO, 10 mg at 01/05/21 1309  •  budesonide-formoterol (SYMBICORT) 160-4.5 MCG/ACT inhaler 2 puff, 2 puff, Inhalation, BID - RT, Bob Lewis DO, 2 puff at 01/05/21 1921  •  docusate sodium (COLACE) capsule 100 mg, 100 mg, Oral, BID, Bob Lewis DO, 100 mg at 01/05/21 0843  •  guaiFENesin (MUCINEX) 12 hr tablet 600 mg, 600 mg, Oral, Q12H, Bob Lewis DO, 600 mg at 01/05/21 0843  •  influenza vac split quad (FLUZONE,FLUARIX,AFLURIA,FLULAVAL) injection 0.5 mL, 0.5 mL, Intramuscular, During Hospitalization, Bob Lewis DO  •  ipratropium-albuterol (DUO-NEB) nebulizer solution 3 mL, 3 mL, Nebulization, Q4H - RT, Bob Lewis, , 3 mL at 01/05/21 1921  •  ketorolac (TORADOL) injection 15 mg, 15 mg, Intravenous, Q6H PRN, Shanna Goins MD, 15 mg at 01/04/21 0441  •  lactated ringers " infusion, 9 mL/hr, Intravenous, Continuous PRN, Den Plummer DO, Stopped at 12/30/20 2009  •  levothyroxine (SYNTHROID, LEVOTHROID) tablet 100 mcg, 100 mcg, Oral, QAM AC, Bob Lewis, , 100 mcg at 01/05/21 0843  •  methylnaltrexone (RELISTOR) injection 6 mg, 6 mg, Subcutaneous, Every Other Day, Shanna Goins MD, 6 mg at 01/04/21 0901  •  metoclopramide (REGLAN) injection 10 mg, 10 mg, Intravenous, Q6H, Shanna Goins MD, 10 mg at 01/05/21 1743  •  Morphine sulfate (PF) injection 2 mg, 2 mg, Intravenous, Q4H PRN, Bob Lewis DO, 2 mg at 01/02/21 1328  •  [DISCONTINUED] HYDROmorphone (DILAUDID) injection 0.5 mg, 0.5 mg, Intravenous, Q2H PRN, 0.5 mg at 12/31/20 2259 **AND** naloxone (NARCAN) injection 0.1 mg, 0.1 mg, Intravenous, Q5 Min PRN, Den Plummer,   •  [DISCONTINUED] Morphine sulfate (PF) injection 4 mg, 4 mg, Intravenous, Q2H PRN **AND** naloxone (NARCAN) injection 0.4 mg, 0.4 mg, Intravenous, Q5 Min PRN, Den Plummer, DO  •  ondansetron (ZOFRAN) tablet 4 mg, 4 mg, Oral, Q6H PRN **OR** ondansetron (ZOFRAN) injection 4 mg, 4 mg, Intravenous, Q6H PRN, Den Plummer,   •  oxyCODONE (ROXICODONE) immediate release tablet 5 mg, 5 mg, Oral, Q8H PRN, Shanna Goins MD  •  pantoprazole (PROTONIX) EC tablet 40 mg, 40 mg, Oral, Q AM, Den Plummer, , 40 mg at 01/05/21 0536  •  phenylephrine (AUDRA-SYNEPHRINE) 50 mg in 250 mL NS infusion, 0.5-3 mcg/kg/min, Intravenous, Continuous PRN, Den Plummer, DO  •  polyethylene glycol (MIRALAX) packet 17 g, 17 g, Oral, Daily, Shanna Goins MD, 17 g at 01/04/21 0902  •  promethazine (PHENERGAN) tablet 12.5 mg, 12.5 mg, Oral, Q6H PRN **OR** promethazine (PHENERGAN) suppository 12.5 mg, 12.5 mg, Rectal, Q6H PRN, Den Plummer, DO  •  risperiDONE (risperDAL M-TABS) disintegrating tablet 0.25 mg, 0.25 mg, Oral, BID PRN, Bob Lewis, , 0.25 mg at 01/05/21 0844  •  rosuvastatin (CRESTOR) tablet 10 mg, 10 mg, Oral, Nightly, Bob Lewis, , 10 mg at 01/04/21  2107  •  saline (AYR) nasal gel, , Topical, PRN, WadsworthBob M, DO  •  sodium chloride 0.9 % flush 10 mL, 10 mL, Intravenous, Q12H, Meg Lund, DO, 10 mL at 01/05/21 0844  •  sodium chloride 0.9 % flush 10 mL, 10 mL, Intravenous, PRN, Lund, Ramírezlaram, DO, 10 mL at 01/05/21 0536    Data Review:  All labs (24hrs):   No results found for this or any previous visit (from the past 24 hour(s)).     Imaging:  XR Chest 1 View  Narrative: XR CHEST 1 VW-     Date of Exam: 1/2/2021 11:00 AM     Indication: dyspne; K21.5-Hqpzih-jctbuuupcd reflux disease without  esophagitis; K44.9-Diaphragmatic hernia without obstruction or gangrene.     Comparison: 12/31/2020     Technique: A single view of the chest was obtained.     FINDINGS:      Cardiomegaly is stable.  Pulmonary vessels are normal.  Lung  volumes are low.  There is persistent left lower lobe airspace  consolidation which is unchanged.  Right lung appears clear.  There are  small bilateral pleural effusions.  Bony structures are unremarkable.           Impression:       1.  Low volume inspiration with persistent left lower lobe airspace  consolidation which is unchanged.  2.  Small bilateral pleural effusions.        Electronically Signed By-Jan Vernon MD On:1/2/2021 11:39 AM  This report was finalized on [id number] by  Jan Vernon MD.       ASSESSMENT:    Acute respiratory failure with hypoxia and hypercapnia (CMS/HCC)   Hiatal hernia with gastroesophageal reflux    LIBBY (obstructive sleep apnea)    Nocturnal hypoxia    Abdominal pain       PLAN:  Chest x-ray in the morning  Oxygen support  Continue current antibiotics  I-S flutter valve  Bronchodilator  Inhaled corticosteroids  Electrolytes/ glycemic control  DVT and GI prophylaxis.    Total Critical care time in direct medical management (   ) minutes  Wily Rutherford MD. D, ABSM.     1/5/2021  20:15 EST     Electronically signed by Wily Rutherford MD at 01/05/21 2015     Den Plummer DO at 01/05/21 3728              LOS: 5 days   Patient Care Team:  Jemima Fontaine MD as PCP - General (Family Medicine)    Reason for follow-up: Postop    Subjective   Patient seen and examined.  Still requiring lots of oxygen    Objective   Abdomen is soft.  Incisions are clean dry and intact.  Tolerating full liquids well    Vital Signs  Vitals:    01/05/21 1126 01/05/21 1411 01/05/21 1446 01/05/21 1450   BP:  133/55     BP Location:  Left arm     Patient Position:  Sitting     Pulse: 85  100 99   Resp: 18 20 19 18   Temp:  98.5 °F (36.9 °C)     TempSrc:  Oral     SpO2: 94%  98% 98%   Weight:       Height:             Results Review:       Lab Results (last 24 hours)     ** No results found for the last 24 hours. **           Imaging Results (Last 24 Hours)     ** No results found for the last 24 hours. **          Medication Review:   Current Facility-Administered Medications:   •  acetaminophen (TYLENOL) tablet 650 mg, 650 mg, Oral, Q4H PRN, 650 mg at 01/03/21 1338 **OR** acetaminophen (TYLENOL) suppository 650 mg, 650 mg, Rectal, Q4H PRN, Den Plummer DO  •  amLODIPine (NORVASC) tablet 10 mg, 10 mg, Oral, Daily With Lunch, Bob Lewis DO, 10 mg at 01/05/21 1309  •  budesonide-formoterol (SYMBICORT) 160-4.5 MCG/ACT inhaler 2 puff, 2 puff, Inhalation, BID - RT, Bob Lewis DO, 2 puff at 01/05/21 0707  •  docusate sodium (COLACE) capsule 100 mg, 100 mg, Oral, BID, Bob Lewis DO, 100 mg at 01/05/21 0843  •  guaiFENesin (MUCINEX) 12 hr tablet 600 mg, 600 mg, Oral, Q12H, Bob Lewis DO, 600 mg at 01/05/21 0843  •  influenza vac split quad (FLUZONE,FLUARIX,AFLURIA,FLULAVAL) injection 0.5 mL, 0.5 mL, Intramuscular, During Hospitalization, Bob Lewis DO  •  ipratropium-albuterol (DUO-NEB) nebulizer solution 3 mL, 3 mL, Nebulization, Q4H - RT, Bob Lewis, DO, 3 mL at 01/05/21 1446  •  ketorolac (TORADOL) injection 15 mg, 15 mg, Intravenous, Q6H PRN, Shanna Goins MD, 15 mg at 01/04/21 0441  •  lactated ringers  infusion, 9 mL/hr, Intravenous, Continuous PRN, Den Plummer DO, Stopped at 12/30/20 2009  •  levothyroxine (SYNTHROID, LEVOTHROID) tablet 100 mcg, 100 mcg, Oral, QAM AC, Bob Lewis, , 100 mcg at 01/05/21 0843  •  methylnaltrexone (RELISTOR) injection 6 mg, 6 mg, Subcutaneous, Every Other Day, Shanna Goins MD, 6 mg at 01/04/21 0901  •  metoclopramide (REGLAN) injection 10 mg, 10 mg, Intravenous, Q6H, Shanna Goins MD, 10 mg at 01/05/21 1309  •  Morphine sulfate (PF) injection 2 mg, 2 mg, Intravenous, Q4H PRN, Bob Lewis DO, 2 mg at 01/02/21 1328  •  [DISCONTINUED] HYDROmorphone (DILAUDID) injection 0.5 mg, 0.5 mg, Intravenous, Q2H PRN, 0.5 mg at 12/31/20 4939 **AND** naloxone (NARCAN) injection 0.1 mg, 0.1 mg, Intravenous, Q5 Min PRN, Den Plummer DO  •  [DISCONTINUED] Morphine sulfate (PF) injection 4 mg, 4 mg, Intravenous, Q2H PRN **AND** naloxone (NARCAN) injection 0.4 mg, 0.4 mg, Intravenous, Q5 Min PRN, Den Plummer, DO  •  ondansetron (ZOFRAN) tablet 4 mg, 4 mg, Oral, Q6H PRN **OR** ondansetron (ZOFRAN) injection 4 mg, 4 mg, Intravenous, Q6H PRN, Den Plummer,   •  oxyCODONE (ROXICODONE) immediate release tablet 5 mg, 5 mg, Oral, Q8H PRN, Shanna Goins MD  •  pantoprazole (PROTONIX) EC tablet 40 mg, 40 mg, Oral, Q AM, Den Plummer, , 40 mg at 01/05/21 0536  •  phenylephrine (AUDRA-SYNEPHRINE) 50 mg in 250 mL NS infusion, 0.5-3 mcg/kg/min, Intravenous, Continuous PRN, Elian, Den D, DO  •  polyethylene glycol (MIRALAX) packet 17 g, 17 g, Oral, Daily, Shanna Goins MD, 17 g at 01/04/21 0902  •  promethazine (PHENERGAN) tablet 12.5 mg, 12.5 mg, Oral, Q6H PRN **OR** promethazine (PHENERGAN) suppository 12.5 mg, 12.5 mg, Rectal, Q6H PRN, Den Plummer, DO  •  risperiDONE (risperDAL M-TABS) disintegrating tablet 0.25 mg, 0.25 mg, Oral, BID PRN, Bob Lewis, , 0.25 mg at 01/05/21 0844  •  rosuvastatin (CRESTOR) tablet 10 mg, 10 mg, Oral, Nightly, Bob Lewis, , 10 mg at 01/04/21  2107  •  saline (AYR) nasal gel, , Topical, PRN, Bob Lewis, DO  •  sodium chloride 0.9 % flush 10 mL, 10 mL, Intravenous, Q12H, Meg Lund, DO, 10 mL at 01/05/21 0844  •  sodium chloride 0.9 % flush 10 mL, 10 mL, Intravenous, PRN, Lund, Bisiram, DO, 10 mL at 01/05/21 0536    Assessment/Plan         Hiatal hernia with gastroesophageal reflux    LIBBY (obstructive sleep apnea)    Nocturnal hypoxia    Acute respiratory failure with hypoxia and hypercapnia (CMS/HCC)    Abdominal pain    Impression: Postop day #6 laparoscopic hiatal hernia repair with gastropexy for giant hiatal hernia with pulmonary compromise    Plan: Surgically is doing well.  Needs 10 days of full liquids before moving up to soft foods.          Den Plummer DO  01/05/21  16:32 EST            Electronically signed by Den Plummer DO at 01/05/21 6361

## 2021-01-06 NOTE — PROGRESS NOTES
"PULMONARY CRITICAL CARE Progress  NOTE      PATIENT IDENTIFICATION:  Name: Diane Guan  MRN: [id number]  :  1940     Age: 80 y.o.  Sex: female    DATE OF Note:  2021   Referring Physician: Den Plummer DO                  Subjective:   Much better but still requiring high oxygen's flow  Improved her cough  No chest pain, no nausea or vomiting, no change in bowel habit, no dysuria,  no new  skin rash or itching.      Objective:  tMax 24 hrs: Temp (24hrs), Av.2 °F (36.8 °C), Min:97.8 °F (36.6 °C), Max:98.5 °F (36.9 °C)      Vitals Ranges:   Temp:  [97.8 °F (36.6 °C)-98.5 °F (36.9 °C)] 98.5 °F (36.9 °C)  Heart Rate:  [] 92  Resp:  [16-20] 18  BP: (130-133)/(50-55) 133/55    Intake and Output Last 3 Shifts:   I/O last 3 completed shifts:  In: 960 [P.O.:960]  Out: 75 [Urine:75]    Exam:  /55 (BP Location: Left arm, Patient Position: Sitting)   Pulse 92   Temp 98.5 °F (36.9 °C) (Oral)   Resp 18   Ht 157.5 cm (62\")   Wt 83.9 kg (184 lb 15.5 oz)   SpO2 96%   BMI 33.83 kg/m²     General Appearance:   Alert awake  HEENT:  Normocephalic, without obvious abnormality, Conjunctiva/corneas clear,.  Normal external ear canals, Nares normal, no drainage     Neck:  Supple, symmetrical, trachea midline. No JVD.  Lungs /Chest wall:   Bilateral basal rhonchi, respirations unlabored symmetrical wall movement.     Heart:  Regular rate and rhythm, systolic murmur PMI left sternal border  Abdomen: Soft, non-tender, no masses, no organomegaly.    Extremities: Trace edema no clubbing or Cyanosis        Medications:    Current Facility-Administered Medications:   •  acetaminophen (TYLENOL) tablet 650 mg, 650 mg, Oral, Q4H PRN, 650 mg at 21 1338 **OR** acetaminophen (TYLENOL) suppository 650 mg, 650 mg, Rectal, Q4H PRN, Den Plummer, DO  •  amLODIPine (NORVASC) tablet 10 mg, 10 mg, Oral, Daily With Lunch, Bob Lewis DO, 10 mg at 21 1309  •  budesonide-formoterol (SYMBICORT) " 160-4.5 MCG/ACT inhaler 2 puff, 2 puff, Inhalation, BID - RT, Bob Lewis, , 2 puff at 01/05/21 1921  •  docusate sodium (COLACE) capsule 100 mg, 100 mg, Oral, BID, Bob Lewis, , 100 mg at 01/05/21 0843  •  guaiFENesin (MUCINEX) 12 hr tablet 600 mg, 600 mg, Oral, Q12H, Bob Lewis, , 600 mg at 01/05/21 0843  •  influenza vac split quad (FLUZONE,FLUARIX,AFLURIA,FLULAVAL) injection 0.5 mL, 0.5 mL, Intramuscular, During Hospitalization, Bob Lewis DO  •  ipratropium-albuterol (DUO-NEB) nebulizer solution 3 mL, 3 mL, Nebulization, Q4H - RT, Bob Lewis, , 3 mL at 01/05/21 1921  •  ketorolac (TORADOL) injection 15 mg, 15 mg, Intravenous, Q6H PRN, Shanna Goins MD, 15 mg at 01/04/21 0441  •  lactated ringers infusion, 9 mL/hr, Intravenous, Continuous PRN, Den Plummer DO, Stopped at 12/30/20 2009  •  levothyroxine (SYNTHROID, LEVOTHROID) tablet 100 mcg, 100 mcg, Oral, QAM AC, Bob Lewis, , 100 mcg at 01/05/21 0843  •  methylnaltrexone (RELISTOR) injection 6 mg, 6 mg, Subcutaneous, Every Other Day, Shanna Goins MD, 6 mg at 01/04/21 0901  •  metoclopramide (REGLAN) injection 10 mg, 10 mg, Intravenous, Q6H, Shanna Goins MD, 10 mg at 01/05/21 1743  •  Morphine sulfate (PF) injection 2 mg, 2 mg, Intravenous, Q4H PRN, Bob Lewis DO, 2 mg at 01/02/21 1328  •  [DISCONTINUED] HYDROmorphone (DILAUDID) injection 0.5 mg, 0.5 mg, Intravenous, Q2H PRN, 0.5 mg at 12/31/20 2259 **AND** naloxone (NARCAN) injection 0.1 mg, 0.1 mg, Intravenous, Q5 Min PRN, Den Plummer, DO  •  [DISCONTINUED] Morphine sulfate (PF) injection 4 mg, 4 mg, Intravenous, Q2H PRN **AND** naloxone (NARCAN) injection 0.4 mg, 0.4 mg, Intravenous, Q5 Min PRN, Den Plummer, DO  •  ondansetron (ZOFRAN) tablet 4 mg, 4 mg, Oral, Q6H PRN **OR** ondansetron (ZOFRAN) injection 4 mg, 4 mg, Intravenous, Q6H PRN, Den Plummer, DO  •  oxyCODONE (ROXICODONE) immediate release tablet 5 mg, 5 mg, Oral, Q8H PRN, Shanna Goins MD  •   pantoprazole (PROTONIX) EC tablet 40 mg, 40 mg, Oral, Q AM, Den Plummer, DO, 40 mg at 01/05/21 0536  •  phenylephrine (AUDRA-SYNEPHRINE) 50 mg in 250 mL NS infusion, 0.5-3 mcg/kg/min, Intravenous, Continuous PRN, Den Plummer, DO  •  polyethylene glycol (MIRALAX) packet 17 g, 17 g, Oral, Daily, Shanna Goins MD, 17 g at 01/04/21 0902  •  promethazine (PHENERGAN) tablet 12.5 mg, 12.5 mg, Oral, Q6H PRN **OR** promethazine (PHENERGAN) suppository 12.5 mg, 12.5 mg, Rectal, Q6H PRN, Den Plummer, DO  •  risperiDONE (risperDAL M-TABS) disintegrating tablet 0.25 mg, 0.25 mg, Oral, BID PRN, Bob Lewis, DO, 0.25 mg at 01/05/21 0844  •  rosuvastatin (CRESTOR) tablet 10 mg, 10 mg, Oral, Nightly, Bob Lewis, DO, 10 mg at 01/04/21 2107  •  saline (AYR) nasal gel, , Topical, PRN, Bob Lewis, DO  •  sodium chloride 0.9 % flush 10 mL, 10 mL, Intravenous, Q12H, Ramírez Lundlaram, DO, 10 mL at 01/05/21 0844  •  sodium chloride 0.9 % flush 10 mL, 10 mL, Intravenous, PRN, Bisi Lundram, DO, 10 mL at 01/05/21 0536    Data Review:  All labs (24hrs):   No results found for this or any previous visit (from the past 24 hour(s)).     Imaging:  XR Chest 1 View  Narrative: XR CHEST 1 VW-     Date of Exam: 1/2/2021 11:00 AM     Indication: dyspne; K21.1-Prgrar-yykmzncddb reflux disease without  esophagitis; K44.9-Diaphragmatic hernia without obstruction or gangrene.     Comparison: 12/31/2020     Technique: A single view of the chest was obtained.     FINDINGS:      Cardiomegaly is stable.  Pulmonary vessels are normal.  Lung  volumes are low.  There is persistent left lower lobe airspace  consolidation which is unchanged.  Right lung appears clear.  There are  small bilateral pleural effusions.  Bony structures are unremarkable.           Impression:       1.  Low volume inspiration with persistent left lower lobe airspace  consolidation which is unchanged.  2.  Small bilateral pleural effusions.        Electronically Signed  By-Jan Vernon MD On:1/2/2021 11:39 AM  This report was finalized on [id number] by  Jan Vernon MD.       ASSESSMENT:    Acute respiratory failure with hypoxia and hypercapnia (CMS/HCC)   Hiatal hernia with gastroesophageal reflux    LIBBY (obstructive sleep apnea)    Nocturnal hypoxia    Abdominal pain       PLAN:  Chest x-ray in the morning  Oxygen support  Continue current antibiotics  I-S flutter valve  Bronchodilator  Inhaled corticosteroids  Electrolytes/ glycemic control  DVT and GI prophylaxis.    Total Critical care time in direct medical management (   ) minutes  Wily Rutherford MD. D, ABSM.     1/5/2021  20:15 EST

## 2021-01-06 NOTE — PROGRESS NOTES
[facility] Medicine Services Daily Progress Note      Hospitalist Team  LOS 6 days      Patient Care Team:  Jemima Fontaine MD as PCP - General (Family Medicine)    Patient Location: 2105/1      Subjective   Subjective     Chief Complaint / Subjective  No chief complaint on file.  follow up shortness of breath       Brief Synopsis of Hospital Course/HPI     80-year-old female who was having issues with GERD was found to have hiatal hernia and is underwent fixation per general surgery.  She has been admitted postoperatively medicine has been consulted to help with her medical problems.  Her blood pressure stable she is having some apnea spells from the sedation but overall her breathing is stable.  No other issues.  As reported she is slightly sedated from the procedure and HPI was slightly limited.         Date::    12/31/20: had worsening respiratory status last night and has been on HFNC this am. Refusing to wear BIPAP although likely needs it fo LIBBY.    1/1/20: on BIPAP this am and thus HPI limited. Events of overnight noted was moved to higher level of care. O2 is stable today. Seems O2 worse when not deep breathing and when sleeping as has LIBBY.     1/2/21: no real improvement. Having issues with pain medication and sedation not allowing pulmonary toilet. Will not wear BIPAP    1/3/21: Slight improvement today.  Per the nurse she had been using her incentive spirometer and been more ambulatory.  Encourage patient with pulmonology at bedside to continue to increase activity    1/4/2021: Patient seen and examined this morning.  Remains short of breath and on high flow.  Encouraged to ambulate more and use examination amatory, patient agrees.  No other complaints or acute events overnight per nursing.    1/5/2021: Patient seen this morning.  Still the same, shortness of breath the same.  Remains on high flow.  No other complaints.    1/6/2021: Patient seen and examined this morning.   "Feeling slightly better but not significantly improved.  Remains on high flow.  Chest x-ray noted to have bilateral congestion, started on Lasix, echo ordered.    Denies fever, chills, chest pain, nausea, vomiting, diarrhea, dysuria, or dizziness.    Review of Systems   All other systems reviewed and are negative.        Objective   Objective      Vital Signs  Temp:  [97.9 °F (36.6 °C)-98.5 °F (36.9 °C)] 98.2 °F (36.8 °C)  Heart Rate:  [] 89  Resp:  [17-20] 17  BP: (133-146)/(50-60) 135/51  Oxygen Therapy  SpO2: 98 %  Pulse Oximetry Type: Continuous  Device (Oxygen Therapy): high-flow nasal cannula, humidified, heated  $ High Flow Nasal Cannula Set-Up: yes  Flow (L/min): 40  Oxygen Concentration (%): 90  Flowsheet Rows      First Filed Value   Admission Height  157.5 cm (62\") Documented at 12/16/2020 1444   Admission Weight  81.6 kg (180 lb) Documented at 12/16/2020 1444        Intake & Output (last 3 days)       01/03 0701 - 01/04 0700 01/04 0701 - 01/05 0700 01/05 0701 - 01/06 0700 01/06 0701 - 01/07 0700    P.O. 240 600 360     I.V. (mL/kg)        IV Piggyback        Total Intake(mL/kg) 240 (2.9) 600 (7.2) 360 (4.4)     Urine (mL/kg/hr) 100 (0)  125 (0.1)     Stool   0     Total Output 100  125     Net +140 +600 +235             Urine Unmeasured Occurrence 3 x 1 x 4 x 2 x    Stool Unmeasured Occurrence 2 x 1 x 1 x 1 x        Lines, Drains & Airways    Active LDAs     Name:   Placement date:   Placement time:   Site:   Days:    Peripheral IV 12/31/20 0938 Anterior;Left Forearm   12/31/20    0938    Forearm   less than 1                  Physical Exam:    General: Awake, alert, elderly female, NAD  Eyes: PERRL, EOMI, conjunctive are clear  Cardiovascular: Regular rate and rhythm, no murmurs  Respiratory: Decreased breath sounds bilaterally, no wheezing or rales, unlabored breathing  Abdomen: Soft, tender around incision sites, positive bowel sounds, no guarding  Neurologic: A&O, CN grossly intact, moves all " extremities spontaneously  Musculoskeletal: Normal range of motion, no deformities  Skin: Warm, dry, intact         Wounds (last 24 hours)      LDA Wound     Row Name 01/06/21 0800 01/05/21 2043 01/05/21 1600       Wound 12/30/20 1037 abdomen Incision    Wound - Properties Group Placement Date: 12/30/20  -AM Placement Time: 1037  -AM Location: abdomen  -AM Primary Wound Type: Incision  -AM    Dressing Appearance  dry;intact;open to air  -HV  --  --    Closure  BRIANNE  -HV  BRIANNE  -SE  BRIANNE  -SH    Base  dressing in place, unable to visualize  -HV  dressing in place, unable to visualize  -SE  dressing in place, unable to visualize  -SH    Periwound  intact;dry  -HV  --  --    Retired Wound - Properties Group Date first assessed: 12/30/20  -AM Time first assessed: 1037  -AM Location: abdomen  -AM Primary Wound Type: Incision  -AM    Row Name 01/05/21 1200             Wound 12/30/20 1037 abdomen Incision    Wound - Properties Group Placement Date: 12/30/20  -AM Placement Time: 1037  -AM Location: abdomen  -AM Primary Wound Type: Incision  -AM    Closure  BRIANNE  -SH      Base  dressing in place, unable to visualize  -SH      Retired Wound - Properties Group Date first assessed: 12/30/20  -AM Time first assessed: 1037  -AM Location: abdomen  -AM Primary Wound Type: Incision  -AM      User Key  (r) = Recorded By, (t) = Taken By, (c) = Cosigned By    Initials Name Provider Type    AM Ping Martinez RN Registered Nurse    Lisa Alfonso, RN Registered Nurse    Roshni Hanna, RN Registered Nurse    Isabella Mariee, RN Registered Nurse          Procedures:    Procedure(s):  Laparoscopic hiatal hernia repair with gastropexy          Results Review:     I reviewed the patient's new clinical results.      Lab Results (last 24 hours)     Procedure Component Value Units Date/Time    Magnesium [[id number]]  (Abnormal) Collected: 01/06/21 0907    Specimen: Blood Updated: 01/06/21 1118     Magnesium 1.3 mg/dL     Basic  Metabolic Panel [[id number]]  (Abnormal) Collected: 01/06/21 0907    Specimen: Blood Updated: 01/06/21 1015     Glucose 191 mg/dL      BUN 9 mg/dL      Creatinine 0.90 mg/dL      Sodium 138 mmol/L      Potassium 3.3 mmol/L      Chloride 102 mmol/L      CO2 25.0 mmol/L      Calcium 9.0 mg/dL      eGFR Non African Amer 60 mL/min/1.73      BUN/Creatinine Ratio 10.0     Anion Gap 11.0 mmol/L     Narrative:      GFR Normal >60  Chronic Kidney Disease <60  Kidney Failure <15      CBC (No Diff) [[id number]]  (Abnormal) Collected: 01/06/21 0907    Specimen: Blood Updated: 01/06/21 0946     WBC 12.80 10*3/mm3      RBC 3.74 10*6/mm3      Hemoglobin 10.1 g/dL      Hematocrit 31.1 %      MCV 83.0 fL      MCH 26.9 pg      MCHC 32.5 g/dL      RDW 15.3 %      RDW-SD 44.2 fl      MPV 9.3 fL      Platelets 178 10*3/mm3         No results found for: HGBA1C        Results from last 7 days   Lab Units 01/02/21  1711   PH, ARTERIAL pH units 7.353   PO2 ART mm Hg 72.0*   PCO2, ARTERIAL mm Hg 48.2*   HCO3 ART mmol/L 26.8     No results found for: LIPASE  Lab Results   Component Value Date    CHOL 374 (H) 01/03/2020    TRIG 311 (H) 01/03/2020    HDL 50 01/03/2020     (H) 01/03/2020       Lab Results   Lab Value Date/Time    FINALDX  12/31/2020 0205     Leukocytosis with absolute atypical lymphocytosis  Anemia  No blasts identified  If CBC indices persist/worsen, consider flow cytometry or other studies as clinically indicated to exclude a lymphoproliferative disorder      FINALDX  02/06/2020 1405     Absolute atypical lymphocytosis.  Anemia.  No blasts identified.  Recommend clinical follow-up and additional studies to include flow cytometry as clinically indicated to exclude CLL/SLL or other lymphoproliferative disorder.         Microbiology Results (last 10 days)     Procedure Component Value - Date/Time    COVID-19,APTIMA PANTHER,[facility] IN-HOUSE, NP/OP SWAB IN UTM/VTM/SALINE TRANSPORT MEDIA,24 HR TAT - Swab, Nasopharynx [[id number]]   (Normal) Collected: 12/28/20 1227    Lab Status: Final result Specimen: Swab from Nasopharynx Updated: 12/28/20 2046     COVID19 Not Detected    Narrative:      Fact sheet for providers: https://www.fda.gov/media/468854/download     Fact sheet for patients: https://www.fda.gov/media/106969/download    Test performed by PCR.          ECG/EMG Results (most recent)     Procedure Component Value Units Date/Time    ECG 12 Lead [[id number]] Collected: 12/30/20 1314     Updated: 01/02/21 1511     QT Interval 421 ms     Narrative:      HEART RATE= 71  bpm  RR Interval= 844  ms  [place] Interval= 172  ms  P Horizontal Axis= -9  deg  P Front Axis= 48  deg  QRSD Interval= 101  ms  QT Interval= 421  ms  QRS Axis= 41  deg  T Wave Axis= 77  deg  - ABNORMAL ECG -  Sinus rhythm With old anterior septal MI  Low voltage, precordial leads  When compared with ECG of 18-Dec-2020 14:29:46,  Poor R wave progression V1 to V3 noted  Electronically Signed By: Fausto Jean (PAVAN) 02-Jan-2021 15:05:56  Date and Time of Study: 2020-12-30 13:14:52                    Xr Chest 1 View    Result Date: 1/5/2021  Increased pulmonary edema from previous study. Persistent bibasilar airspace opacities atelectasis versus pneumonia. Small bilateral pleural effusions.  Electronically Signed By-Muriel Spencer MD On:1/5/2021 10:10 PM This report was finalized on [id number] by  Muriel Spencer MD.    Xr Chest 1 View    Result Date: 1/2/2021    1.  Low volume inspiration with persistent left lower lobe airspace consolidation which is unchanged. 2.  Small bilateral pleural effusions.   Electronically Signed By-Jan Vernon MD On:1/2/2021 11:39 AM This report was finalized on [id number] by  Jan Vernon MD.    Xr Chest 1 View    Result Date: 12/31/2020   1. Consolidation in the left lung base which appears new. 2. Lower lung volumes with right basilar atelectasis. 3. Cardiomegaly without acute cardiac decompensation. 4. Right basilar atelectasis.   Electronically Signed By-Den Connell MD On:12/31/2020 9:10 AM This report was finalized on [id number] by  Den Connell MD.          Xrays, labs reviewed personally by physician.    Medication Review:   I have reviewed the patient's current medication list      Scheduled Meds  amLODIPine, 10 mg, Oral, Daily With Lunch  budesonide-formoterol, 2 puff, Inhalation, BID - RT  docusate sodium, 100 mg, Oral, BID  furosemide, 40 mg, Intravenous, BID  guaiFENesin, 600 mg, Oral, Q12H  ipratropium-albuterol, 3 mL, Nebulization, Q4H - RT  levothyroxine, 100 mcg, Oral, QAM AC  methylnaltrexone, 6 mg, Subcutaneous, Every Other Day  metoclopramide, 10 mg, Intravenous, Q6H  pantoprazole, 40 mg, Oral, Q AM  polyethylene glycol, 17 g, Oral, Daily  rosuvastatin, 10 mg, Oral, Nightly  sodium chloride, 10 mL, Intravenous, Q12H        Meds Infusions  lactated ringers, 9 mL/hr, Last Rate: Stopped (12/30/20 2009)  phenylephrine, 0.5-3 mcg/kg/min        Meds PRN  •  acetaminophen **OR** acetaminophen  •  influenza vaccine  •  ketorolac  •  lactated ringers  •  magnesium sulfate **OR** magnesium sulfate **OR** magnesium sulfate  •  Morphine  •  [DISCONTINUED] HYDROmorphone **AND** naloxone  •  [DISCONTINUED] Morphine **AND** naloxone  •  ondansetron **OR** ondansetron  •  oxyCODONE  •  phenylephrine  •  potassium chloride **OR** potassium chloride **OR** potassium chloride  •  promethazine **OR** promethazine  •  risperiDONE  •  saline  •  sodium chloride        Assessment/Plan   Assessment/Plan     Active Hospital Problems    Diagnosis  POA   • **Hiatal hernia with gastroesophageal reflux [K21.9, K44.9]  Unknown   • Acute respiratory failure with hypoxia and hypercapnia (CMS/HCC) [J96.01, J96.02]  Unknown   • Abdominal pain [R10.9]  Unknown   • Nocturnal hypoxia [G47.34]  Yes   • LIBBY (obstructive sleep apnea) [G47.33]  Unknown      Resolved Hospital Problems   No resolved problems to display.       MEDICAL DECISION MAKING COMPLEXITY BY  PROBLEM:     Acute on chronic hypoxic respiratory failure, LIBBY  LLL PNA  -likely realted to narcotics and LIBBY vs LLL CAP  -CXR 12/31/20: new LLL infiltrate  -CT chest reviewed b/l LL atelectasis vs infiltrate  -Remains on high flow, wean off as tolerated  -compliant with AVAP  -Nebulizers, symbicort  -Finished Zosyn  -encourage aggressive pulmonary toilet  -Repeat chest x-ray on 1/6 shows bilateral congestion and atelectasis  -Start on Lasix IV twice daily, monitor I's and O's  -Echo ordered to evaluate EF  -pulmonary following    Hiatal hernia with GERD   Possible postoperative ileus  -S/p laparoscopic hiatal hernia repair with gastropexy  -General surgery primary postop per them  -PPI, carafate, reglan prn  -Diet and bowel regimen per surgery      Hypertension  -Continue home meds  -Monitor and adjust blood pressure prn    CLL  -WBC chronically elevated,(baseline 15-20 range)  -Follow CBC while here    DVT prophylaxis  -Per primary      VTE Prophylaxis -   Mechanical Order History:      Ordered        12/30/20 1513  Place Sequential Compression Device  Once         12/30/20 1513  Place Sequential Compression Device  Once         12/30/20 1513  Maintain Sequential Compression Device  Continuous                 Pharmalogical Order History:     None            Code Status -   Code Status and Medical Interventions:   Ordered at: 12/30/20 1513     Code Status:    CPR     Medical Interventions (Level of Support Prior to Arrest):    Full       This patient has been examined wearing appropriate Personal Protective Equipment. 01/06/21        Discharge Planning  Respiratory status will have to improve before d/c         Electronically signed by Meg Lund DO, 01/06/21, 11:36 [facility] Hospitalist Team

## 2021-01-07 LAB
ANION GAP SERPL CALCULATED.3IONS-SCNC: 13 MMOL/L (ref 5–15)
BUN SERPL-MCNC: 12 MG/DL (ref 8–23)
BUN/CREAT SERPL: 10.6 (ref 7–25)
CALCIUM SPEC-SCNC: 9.1 MG/DL (ref 8.6–10.5)
CHLORIDE SERPL-SCNC: 97 MMOL/L (ref 98–107)
CO2 SERPL-SCNC: 27 MMOL/L (ref 22–29)
CREAT SERPL-MCNC: 1.13 MG/DL (ref 0.57–1)
GFR SERPL CREATININE-BSD FRML MDRD: 46 ML/MIN/1.73
GLUCOSE SERPL-MCNC: 167 MG/DL (ref 65–99)
MAGNESIUM SERPL-MCNC: 1.9 MG/DL (ref 1.6–2.4)
POTASSIUM SERPL-SCNC: 3.7 MMOL/L (ref 3.5–5.2)
SODIUM SERPL-SCNC: 137 MMOL/L (ref 136–145)

## 2021-01-07 PROCEDURE — 83735 ASSAY OF MAGNESIUM: CPT | Performed by: INTERNAL MEDICINE

## 2021-01-07 PROCEDURE — 80048 BASIC METABOLIC PNL TOTAL CA: CPT | Performed by: INTERNAL MEDICINE

## 2021-01-07 PROCEDURE — 25010000002 METOCLOPRAMIDE PER 10 MG: Performed by: SURGERY

## 2021-01-07 PROCEDURE — 94799 UNLISTED PULMONARY SVC/PX: CPT

## 2021-01-07 PROCEDURE — 99232 SBSQ HOSP IP/OBS MODERATE 35: CPT | Performed by: INTERNAL MEDICINE

## 2021-01-07 PROCEDURE — 97116 GAIT TRAINING THERAPY: CPT

## 2021-01-07 PROCEDURE — 25010000002 FUROSEMIDE PER 20 MG: Performed by: INTERNAL MEDICINE

## 2021-01-07 PROCEDURE — 97530 THERAPEUTIC ACTIVITIES: CPT

## 2021-01-07 RX ORDER — HYDROXYZINE HYDROCHLORIDE 25 MG/1
50 TABLET, FILM COATED ORAL ONCE
Status: COMPLETED | OUTPATIENT
Start: 2021-01-07 | End: 2021-01-07

## 2021-01-07 RX ADMIN — METOCLOPRAMIDE HYDROCHLORIDE 10 MG: 5 INJECTION INTRAMUSCULAR; INTRAVENOUS at 23:02

## 2021-01-07 RX ADMIN — RISPERIDONE 0.25 MG: 0.5 TABLET, ORALLY DISINTEGRATING ORAL at 19:38

## 2021-01-07 RX ADMIN — Medication 10 ML: at 19:38

## 2021-01-07 RX ADMIN — METOCLOPRAMIDE HYDROCHLORIDE 10 MG: 5 INJECTION INTRAMUSCULAR; INTRAVENOUS at 13:30

## 2021-01-07 RX ADMIN — GUAIFENESIN 600 MG: 600 TABLET, EXTENDED RELEASE ORAL at 08:09

## 2021-01-07 RX ADMIN — GUAIFENESIN 600 MG: 600 TABLET, EXTENDED RELEASE ORAL at 19:37

## 2021-01-07 RX ADMIN — IPRATROPIUM BROMIDE AND ALBUTEROL SULFATE 3 ML: 2.5; .5 SOLUTION RESPIRATORY (INHALATION) at 10:00

## 2021-01-07 RX ADMIN — BUDESONIDE AND FORMOTEROL FUMARATE DIHYDRATE 2 PUFF: 160; 4.5 AEROSOL RESPIRATORY (INHALATION) at 19:13

## 2021-01-07 RX ADMIN — IPRATROPIUM BROMIDE AND ALBUTEROL SULFATE 3 ML: 2.5; .5 SOLUTION RESPIRATORY (INHALATION) at 00:13

## 2021-01-07 RX ADMIN — AMLODIPINE BESYLATE 10 MG: 5 TABLET ORAL at 13:30

## 2021-01-07 RX ADMIN — DOCUSATE SODIUM 100 MG: 100 CAPSULE, LIQUID FILLED ORAL at 08:09

## 2021-01-07 RX ADMIN — METOCLOPRAMIDE HYDROCHLORIDE 10 MG: 5 INJECTION INTRAMUSCULAR; INTRAVENOUS at 06:12

## 2021-01-07 RX ADMIN — ROSUVASTATIN CALCIUM 10 MG: 10 TABLET, FILM COATED ORAL at 19:37

## 2021-01-07 RX ADMIN — IPRATROPIUM BROMIDE AND ALBUTEROL SULFATE 3 ML: 2.5; .5 SOLUTION RESPIRATORY (INHALATION) at 03:40

## 2021-01-07 RX ADMIN — LEVOTHYROXINE SODIUM 100 MCG: 125 TABLET ORAL at 08:09

## 2021-01-07 RX ADMIN — IPRATROPIUM BROMIDE AND ALBUTEROL SULFATE 3 ML: 2.5; .5 SOLUTION RESPIRATORY (INHALATION) at 19:13

## 2021-01-07 RX ADMIN — IPRATROPIUM BROMIDE AND ALBUTEROL SULFATE 3 ML: 2.5; .5 SOLUTION RESPIRATORY (INHALATION) at 22:50

## 2021-01-07 RX ADMIN — PANTOPRAZOLE SODIUM 40 MG: 40 TABLET, DELAYED RELEASE ORAL at 06:13

## 2021-01-07 RX ADMIN — HYDROXYZINE HYDROCHLORIDE 50 MG: 25 TABLET, FILM COATED ORAL at 03:15

## 2021-01-07 RX ADMIN — FUROSEMIDE 40 MG: 10 INJECTION, SOLUTION INTRAMUSCULAR; INTRAVENOUS at 08:09

## 2021-01-07 RX ADMIN — RISPERIDONE 0.25 MG: 0.5 TABLET, ORALLY DISINTEGRATING ORAL at 09:27

## 2021-01-07 RX ADMIN — Medication 10 ML: at 08:09

## 2021-01-07 RX ADMIN — METOCLOPRAMIDE HYDROCHLORIDE 10 MG: 5 INJECTION INTRAMUSCULAR; INTRAVENOUS at 17:51

## 2021-01-07 RX ADMIN — FUROSEMIDE 40 MG: 10 INJECTION, SOLUTION INTRAMUSCULAR; INTRAVENOUS at 17:51

## 2021-01-07 RX ADMIN — IPRATROPIUM BROMIDE AND ALBUTEROL SULFATE 3 ML: 2.5; .5 SOLUTION RESPIRATORY (INHALATION) at 15:05

## 2021-01-07 RX ADMIN — METOCLOPRAMIDE HYDROCHLORIDE 10 MG: 5 INJECTION INTRAMUSCULAR; INTRAVENOUS at 01:30

## 2021-01-07 RX ADMIN — DOCUSATE SODIUM 100 MG: 100 CAPSULE, LIQUID FILLED ORAL at 19:37

## 2021-01-07 NOTE — PROGRESS NOTES
"PULMONARY CRITICAL CARE Progress  NOTE      PATIENT IDENTIFICATION:  Name: Diane Guan  MRN: [id number]  :  1940     Age: 80 y.o.  Sex: female    DATE OF Note:  2021   Referring Physician: Den Plummer DO                  Subjective:   On 50 % FIO2 now   No chest pain, no nausea or vomiting, no change in bowel habit, no dysuria,  no new  skin rash or itching.      Objective:  tMax 24 hrs: Temp (24hrs), Av.6 °F (37 °C), Min:97.9 °F (36.6 °C), Max:99.6 °F (37.6 °C)      Vitals Ranges:   Temp:  [97.9 °F (36.6 °C)-99.6 °F (37.6 °C)] 99.6 °F (37.6 °C)  Heart Rate:  [] 93  Resp:  [17-20] 20  BP: (135-147)/(50-88) 147/88    Intake and Output Last 3 Shifts:   I/O last 3 completed shifts:  In: 360 [P.O.:360]  Out: 1675 [Urine:1675]    Exam:  /88 (BP Location: Left arm, Patient Position: Lying)   Pulse 93   Temp 99.6 °F (37.6 °C) (Axillary)   Resp 20   Ht 157.5 cm (62\")   Wt 82.1 kg (181 lb)   SpO2 97%   BMI 33.11 kg/m²     General Appearance:   Alert awake  HEENT:  Normocephalic, without obvious abnormality, Conjunctiva/corneas clear,.  Normal external ear canals, Nares normal, no drainage     Neck:  Supple, symmetrical, trachea midline. No JVD.  Lungs /Chest wall:   Bilateral basal rhonchi, respirations unlabored symmetrical wall movement.     Heart:  Regular rate and rhythm, systolic murmur PMI left sternal border  Abdomen: Soft, non-tender, no masses, no organomegaly.    Extremities: Trace edema no clubbing or Cyanosis        Medications:    Current Facility-Administered Medications:   •  acetaminophen (TYLENOL) tablet 650 mg, 650 mg, Oral, Q4H PRN, 650 mg at 21 1338 **OR** acetaminophen (TYLENOL) suppository 650 mg, 650 mg, Rectal, Q4H PRN, Den Plummer,   •  amLODIPine (NORVASC) tablet 10 mg, 10 mg, Oral, Daily With Lunch, Bob Lewis DO, 10 mg at 21 1113  •  budesonide-formoterol (SYMBICORT) 160-4.5 MCG/ACT inhaler 2 puff, 2 puff, Inhalation, BID - RT, " Bob Lewis DO, 2 puff at 01/06/21 1903  •  docusate sodium (COLACE) capsule 100 mg, 100 mg, Oral, BID, Bob Lewis DO, 100 mg at 01/06/21 2000  •  furosemide (LASIX) injection 40 mg, 40 mg, Intravenous, BID, Meg Lund DO, 40 mg at 01/06/21 1726  •  guaiFENesin (MUCINEX) 12 hr tablet 600 mg, 600 mg, Oral, Q12H, Bob Lewis DO, 600 mg at 01/06/21 2000  •  influenza vac split quad (FLUZONE,FLUARIX,AFLURIA,FLULAVAL) injection 0.5 mL, 0.5 mL, Intramuscular, During Hospitalization, Bob Lewis DO  •  ipratropium-albuterol (DUO-NEB) nebulizer solution 3 mL, 3 mL, Nebulization, Q4H - RT, Bob Lewis DO, 3 mL at 01/06/21 1903  •  ketorolac (TORADOL) injection 15 mg, 15 mg, Intravenous, Q6H PRN, Shanna Goins MD, 15 mg at 01/06/21 2126  •  lactated ringers infusion, 9 mL/hr, Intravenous, Continuous PRN, Den Plummer DO, Stopped at 12/30/20 2009  •  levothyroxine (SYNTHROID, LEVOTHROID) tablet 100 mcg, 100 mcg, Oral, QAM AC, Bob Lewis DO, 100 mcg at 01/06/21 0758  •  Magnesium Sulfate 2 gram Bolus, followed by 8 gram infusion (total Mg dose 10 grams)- Mg less than or equal to 1mg/dL, 2 g, Intravenous, PRN **OR** Magnesium Sulfate 2 gram / 50mL Infusion (GIVE X 3 BAGS TO EQUAL 6GM TOTAL DOSE) - Mg 1.1 - 1.5 mg/dl, 2 g, Intravenous, PRN, Last Rate: 25 mL/hr at 01/06/21 1359, 2 g at 01/06/21 1359 **OR** Magnesium Sulfate 4 gram infusion- Mg 1.6-1.9 mg/dL, 4 g, Intravenous, PRN, Meg Lund DO  •  methylnaltrexone (RELISTOR) injection 6 mg, 6 mg, Subcutaneous, Every Other Day, Shanna Goins MD, 6 mg at 01/06/21 0754  •  metoclopramide (REGLAN) injection 10 mg, 10 mg, Intravenous, Q6H, Shanna Goins MD, 10 mg at 01/06/21 1726  •  Morphine sulfate (PF) injection 2 mg, 2 mg, Intravenous, Q4H PRN, Bob Lewis, , 2 mg at 01/02/21 1328  •  [DISCONTINUED] HYDROmorphone (DILAUDID) injection 0.5 mg, 0.5 mg, Intravenous, Q2H PRN, 0.5 mg at 12/31/20 8721 **AND** naloxone (NARCAN) injection 0.1 mg, 0.1 mg,  Intravenous, Q5 Min PRN, Den Plummer, DO  •  [DISCONTINUED] Morphine sulfate (PF) injection 4 mg, 4 mg, Intravenous, Q2H PRN **AND** naloxone (NARCAN) injection 0.4 mg, 0.4 mg, Intravenous, Q5 Min PRN, Den Plummer, DO  •  ondansetron (ZOFRAN) tablet 4 mg, 4 mg, Oral, Q6H PRN **OR** ondansetron (ZOFRAN) injection 4 mg, 4 mg, Intravenous, Q6H PRN, Den Plummer, DO  •  oxyCODONE (ROXICODONE) immediate release tablet 5 mg, 5 mg, Oral, Q8H PRN, Shanna Goins MD  •  pantoprazole (PROTONIX) EC tablet 40 mg, 40 mg, Oral, Q AM, Den Plummer, DO, 40 mg at 01/06/21 0521  •  phenylephrine (AUDRA-SYNEPHRINE) 50 mg in 250 mL NS infusion, 0.5-3 mcg/kg/min, Intravenous, Continuous PRN, Den Plummer, DO  •  polyethylene glycol (MIRALAX) packet 17 g, 17 g, Oral, Daily, Shanna Goins MD, 17 g at 01/06/21 0754  •  potassium chloride (K-DUR,KLOR-CON) CR tablet 40 mEq, 40 mEq, Oral, PRN, 40 mEq at 01/06/21 1142 **OR** potassium chloride (KLOR-CON) packet 40 mEq, 40 mEq, Oral, PRN **OR** potassium chloride 10 mEq in 100 mL IVPB, 10 mEq, Intravenous, Q1H PRN, Meg Lund, DO  •  promethazine (PHENERGAN) tablet 12.5 mg, 12.5 mg, Oral, Q6H PRN **OR** promethazine (PHENERGAN) suppository 12.5 mg, 12.5 mg, Rectal, Q6H PRN, Den Plummer, DO  •  risperiDONE (risperDAL M-TABS) disintegrating tablet 0.25 mg, 0.25 mg, Oral, BID PRN, Bob Lewis DO, 0.25 mg at 01/05/21 2043  •  rosuvastatin (CRESTOR) tablet 10 mg, 10 mg, Oral, Nightly, Bob Lewis, DO, 10 mg at 01/06/21 2000  •  saline (AYR) nasal gel, , Topical, PRN, Bob Lewis, DO  •  sodium chloride 0.9 % flush 10 mL, 10 mL, Intravenous, Q12H, Meg Lund, , 10 mL at 01/06/21 2000  •  sodium chloride 0.9 % flush 10 mL, 10 mL, Intravenous, PRN, Meg Lund, , 10 mL at 01/06/21 0521    Data Review:  All labs (24hrs):   Recent Results (from the past 24 hour(s))   CBC (No Diff)    Collection Time: 01/06/21  9:07 AM    Specimen: Blood   Result Value Ref Range     WBC 12.80 (H) 3.40 - 10.80 10*3/mm3    RBC 3.74 (L) 3.77 - 5.28 10*6/mm3    Hemoglobin 10.1 (L) 12.0 - 15.9 g/dL    Hematocrit 31.1 (L) 34.0 - 46.6 %    MCV 83.0 79.0 - 97.0 fL    MCH 26.9 26.6 - 33.0 pg    MCHC 32.5 31.5 - 35.7 g/dL    RDW 15.3 12.3 - 15.4 %    RDW-SD 44.2 37.0 - 54.0 fl    MPV 9.3 6.0 - 12.0 fL    Platelets 178 140 - 450 10*3/mm3   Basic Metabolic Panel    Collection Time: 01/06/21  9:07 AM    Specimen: Blood   Result Value Ref Range    Glucose 191 (H) 65 - 99 mg/dL    BUN 9 8 - 23 mg/dL    Creatinine 0.90 0.57 - 1.00 mg/dL    Sodium 138 136 - 145 mmol/L    Potassium 3.3 (L) 3.5 - 5.2 mmol/L    Chloride 102 98 - 107 mmol/L    CO2 25.0 22.0 - 29.0 mmol/L    Calcium 9.0 8.6 - 10.5 mg/dL    eGFR Non African Amer 60 (L) >60 mL/min/1.73    BUN/Creatinine Ratio 10.0 7.0 - 25.0    Anion Gap 11.0 5.0 - 15.0 mmol/L   Magnesium    Collection Time: 01/06/21  9:07 AM    Specimen: Blood   Result Value Ref Range    Magnesium 1.3 (L) 1.6 - 2.4 mg/dL   Potassium    Collection Time: 01/06/21 12:30 PM    Specimen: Blood   Result Value Ref Range    Potassium 3.6 3.5 - 5.2 mmol/L   Adult Transthoracic Echo Complete W/ Cont if Necessary Per Protocol    Collection Time: 01/06/21  2:33 PM   Result Value Ref Range    BSA 1.8 m^2    RVIDd 2.5 cm    IVSd 0.89 cm    LVIDd 5.1 cm    LVIDs 2.9 cm    LVPWd 0.93 cm    IVS/LVPW 0.96     FS 42.3 %    EDV(Teich) 122.8 ml    ESV(Teich) 33.1 ml    EF(Teich) 73.1 %    EDV(cubed) 131.2 ml    ESV(cubed) 25.2 ml    EF(cubed) 80.8 %    LV mass(C)d 164.9 grams    LV mass(C)dI 90.0 grams/m^2    SV(Teich) 89.7 ml    SI(Teich) 49.0 ml/m^2    SV(cubed) 106.1 ml    SI(cubed) 57.9 ml/m^2    Ao root diam 3.2 cm    Ao root area 8.0 cm^2    ACS 1.7 cm    asc Aorta Diam 2.3 cm    LVOT diam 2.0 cm    LVOT area 3.0 cm^2    RVOT diam 2.1 cm    RVOT area 3.3 cm^2    EDV(MOD-sp4) 71.6 ml    ESV(MOD-sp4) 18.7 ml    EF(MOD-sp4) 73.8 %    SV(MOD-sp4) 52.9 ml    SI(MOD-sp4) 28.9 ml/m^2    Ao root area (BSA  corrected) 1.7     LV Leonard Vol (BSA corrected) 39.1 ml/m^2    LV Sys Vol (BSA corrected) 10.2 ml/m^2    Aortic R-R 0.72 sec    Aortic HR 83.4 BPM    MV E max garry 148.1 cm/sec    MV A max garry 117.7 cm/sec    MV E/A 1.3     MV V2 max 125.6 cm/sec    MV max PG 6.3 mmHg    MV V2 mean 87.9 cm/sec    MV mean PG 3.4 mmHg    MV V2 VTI 32.8 cm    MVA(VTI) 2.6 cm^2    MV dec slope 900.2 cm/sec^2    MV dec time 0.16 sec    Ao pk garry 162.4 cm/sec    Ao max PG 10.6 mmHg    Ao max PG (full) 4.9 mmHg    Ao V2 mean 115.5 cm/sec    Ao mean PG 6.0 mmHg    Ao mean PG (full) 2.7 mmHg    Ao V2 VTI 34.3 cm    EDILMA(I,A) 2.5 cm^2    EDILMA(I,D) 2.5 cm^2    EDILMA(V,A) 2.2 cm^2    EDILMA(V,D) 2.2 cm^2    AI max garry 441.0 cm/sec    AI max PG 77.8 mmHg    AI dec slope 379.7 cm/sec^2    AI dec time 1.2 sec    AI P1/2t 340.1 msec    LV V1 max PG 5.7 mmHg    LV V1 mean PG 3.2 mmHg    LV V1 max 119.1 cm/sec    LV V1 mean 84.0 cm/sec    LV V1 VTI 27.8 cm    MR max garry 519.2 cm/sec    MR max .8 mmHg    CO(Ao) 23.0 l/min    CI(Ao) 12.6 l/min/m^2    SV(Ao) 275.9 ml    SI(Ao) 150.6 ml/m^2    CO(LVOT) 7.0 l/min    CI(LVOT) 3.8 l/min/m^2    SV(LVOT) 84.1 ml    SV(RVOT) 62.9 ml    SI(LVOT) 45.9 ml/m^2    PA V2 max 90.0 cm/sec    PA max PG 3.2 mmHg    PA max PG (full) 0.43 mmHg    PA V2 mean 72.3 cm/sec    PA mean PG 2.2 mmHg    PA mean PG (full) 0.66 mmHg    PA V2 VTI 23.7 cm    PVA(I,A) 2.7 cm^2    BH CV ECHO KAREN - PVA(I,D) 2.7 cm^2    BH CV ECHO KAREN - PVA(V,A) 3.1 cm^2    BH CV ECHO KAREN - PVA(V,D) 3.1 cm^2    PA acc time 0.11 sec    PI end-d garry 49.7 cm/sec    PI max garry 236.6 cm/sec    PI max PG 22.4 mmHg    RV V1 max PG 2.8 mmHg    RV V1 mean PG 1.5 mmHg    RV V1 max 83.8 cm/sec    RV V1 mean 57.3 cm/sec    RV V1 VTI 19.0 cm    TR max garry 277.2 cm/sec    RVSP(TR) 33.7 mmHg    RAP systole 3.0 mmHg    PA pr(Accel) 27.6 mmHg    Pulm Sys Garry 66.4 cm/sec    Pulm Leonard Garry 43.5 cm/sec    Pulm S/D 1.5     Qp/Qs 0.75     Pulm A Revs Dur 0.09 sec    Pulm A Revs  Garry 29.6 cm/sec     CV ECHO KAREN - BZI_BMI 33.1 kilograms/m^2     CV ECHO KAREN - BSA(HAYCOCK) 1.9 m^2     CV ECHO KAREN - BZI_METRIC_WEIGHT 82.1 kg     CV ECHO KAREN - BZI_METRIC_HEIGHT 157.5 cm    EF(MOD-bp) 74.0 %    LA dimension(2D) 3.9 cm    Echo EF Estimated 65 %        Imaging:  Adult Transthoracic Echo Complete W/ Cont if Necessary Per Protocol  · Estimated left ventricular EF = 65% Estimated left ventricular EF was in   agreement with the calculated left ventricular EF. Left ventricular   systolic function is normal.  · There is mainly affecting the non-coronary and left coronary cusp(s).  · The right ventricular cavity is mildly dilated.  · Estimated right ventricular systolic pressure from tricuspid   regurgitation is normal (<35 mmHg).          ASSESSMENT:    Acute respiratory failure with hypoxia and hypercapnia (CMS/HCC)   Hiatal hernia with gastroesophageal reflux    LIBBY (obstructive sleep apnea)    Nocturnal hypoxia    Abdominal pain       PLAN:  diuretics  Oxygen support  Continue current antibiotics  I-S flutter valve  Bronchodilator  Inhaled corticosteroids  Electrolytes/ glycemic control  DVT and GI prophylaxis.    Total Critical care time in direct medical management (   ) minutes  Wily Rutherford MD. D, ABSM.     1/6/2021  21:29 EST

## 2021-01-07 NOTE — PLAN OF CARE
Goal Outcome Evaluation:  Plan of Care Reviewed With: patient, daughter  Progress: improving  Outcome Summary: Required sba/cga for safe, functional mobility. Amb. 40' in room using rwx., no lob, vcs for safety c rwx. use. Weakness present, ff posture, no lob, assist c precision flow duirng amb. Unable to accept challenge away from midline.Low activity tolerance. Pt. reports no rehab/HH, has family to assist her. Will progress as able, presents safe to d/c home and reports having all DME needed. PPE worn: Mask, gloves, shield.

## 2021-01-07 NOTE — PLAN OF CARE
Goal Outcome Evaluation:  Plan of Care Reviewed With: patient, sibling  Progress: improving  Outcome Summary: Pt. was able to rest some once anxiety was decreased, risperdal was unsuccessful so atarax was given which seemed to help pt. rest, sats will drop as low as 85% while asleep but recover within a few minutes, Pt. is hopeful to be discharged soon once oxygen level is stable, full liquids continued dispite decreased appetite, will continue to monitor

## 2021-01-07 NOTE — PLAN OF CARE
Goal Outcome Evaluation:  Plan of Care Reviewed With: patient, family  Progress: improving  Outcome Summary: pt being weaned off of PF  ambulating more still c/o nausea at times

## 2021-01-07 NOTE — PROGRESS NOTES
[facility] Medicine Services Daily Progress Note      Hospitalist Team  LOS 7 days      Patient Care Team:  Jemima Fontaine MD as PCP - General (Family Medicine)    Patient Location: 2105/1      Subjective   Subjective     Chief Complaint / Subjective  No chief complaint on file.  follow up shortness of breath       Brief Synopsis of Hospital Course/HPI     80-year-old female who was having issues with GERD was found to have hiatal hernia and is underwent fixation per general surgery.  She has been admitted postoperatively medicine has been consulted to help with her medical problems.  Her blood pressure stable she is having some apnea spells from the sedation but overall her breathing is stable.  No other issues.  As reported she is slightly sedated from the procedure and HPI was slightly limited.         Date::    12/31/20: had worsening respiratory status last night and has been on HFNC this am. Refusing to wear BIPAP although likely needs it fo LIBBY.    1/1/20: on BIPAP this am and thus HPI limited. Events of overnight noted was moved to higher level of care. O2 is stable today. Seems O2 worse when not deep breathing and when sleeping as has LIBBY.     1/2/21: no real improvement. Having issues with pain medication and sedation not allowing pulmonary toilet. Will not wear BIPAP    1/3/21: Slight improvement today.  Per the nurse she had been using her incentive spirometer and been more ambulatory.  Encourage patient with pulmonology at bedside to continue to increase activity    1/4/2021: Patient seen and examined this morning.  Remains short of breath and on high flow.  Encouraged to ambulate more and use examination amatory, patient agrees.  No other complaints or acute events overnight per nursing.    1/5/2021: Patient seen this morning.  Still the same, shortness of breath the same.  Remains on high flow.  No other complaints.    1/6/2021: Patient seen and examined this morning.   "Feeling slightly better but not significantly improved.  Remains on high flow.  Chest x-ray noted to have bilateral congestion, started on Lasix, echo ordered.    1/7/2021: Patient seen examined this morning.  Feeling a little anxious today but shortness of breath has improved.  Oxygen requirement improving.  No acute events overnight per nursing.    Denies fever, chills, chest pain, nausea, vomiting, diarrhea, dysuria, or dizziness.    Review of Systems   All other systems reviewed and are negative.        Objective   Objective      Vital Signs  Temp:  [97.4 °F (36.3 °C)-99.6 °F (37.6 °C)] 98.1 °F (36.7 °C)  Heart Rate:  [] 79  Resp:  [18-20] 18  BP: (101-150)/(51-88) 150/55  Oxygen Therapy  SpO2: 98 %  Pulse Oximetry Type: Continuous  Device (Oxygen Therapy): humidified, high-flow nasal cannula  $ High Flow Nasal Cannula Set-Up: yes  Flow (L/min): 25  Oxygen Concentration (%): 50  Flowsheet Rows      First Filed Value   Admission Height  157.5 cm (62\") Documented at 12/16/2020 1444   Admission Weight  81.6 kg (180 lb) Documented at 12/16/2020 1444        Intake & Output (last 3 days)       01/04 0701 - 01/05 0700 01/05 0701 - 01/06 0700 01/06 0701 - 01/07 0700 01/07 0701 - 01/08 0700    P.O. 600 360  320    Total Intake(mL/kg) 600 (7.2) 360 (4.4)  320 (3.8)    Urine (mL/kg/hr)  125 (0.1) 2010 (1) 500 (1.3)    Stool  0 0 0    Total Output  125 2010 500    Net +600 +235 -2010 -180            Urine Unmeasured Occurrence 1 x 4 x 2 x 1 x    Stool Unmeasured Occurrence 1 x 1 x 3 x 3 x        Lines, Drains & Airways    Active LDAs     Name:   Placement date:   Placement time:   Site:   Days:    Peripheral IV 12/31/20 0938 Anterior;Left Forearm   12/31/20 0938    Forearm   less than 1                  Physical Exam:    General: Awake, alert, elderly female, NAD  Eyes: PERRL, EOMI, conjunctive are clear  Cardiovascular: Regular rate and rhythm, no murmurs  Respiratory: Decreased breath sounds bilaterally, no " wheezing or rales, unlabored breathing  Abdomen: Soft, tender around incision sites, positive bowel sounds, no guarding  Neurologic: A&O, CN grossly intact, moves all extremities spontaneously  Musculoskeletal: Normal range of motion, no deformities  Skin: Warm, dry, intact         Wounds (last 24 hours)      LDA Wound     Row Name 01/07/21 0800 01/07/21 0400 01/07/21 0000       Wound 12/30/20 1037 abdomen Incision    Wound - Properties Group Placement Date: 12/30/20  -AM Placement Time: 1037  -AM Location: abdomen  -AM Primary Wound Type: Incision  -AM    Dressing Appearance  --  dry;intact;open to air  -VB  --    Closure  BRIANNE  -RH  BRIANNE  -VB  BRIANNE  -VB    Base  dressing in place, unable to visualize  -RH  dressing in place, unable to visualize  -VB  dressing in place, unable to visualize  -VB    Periwound  intact;dry  -RH  intact;dry  -VB  intact;dry  -VB    Retired Wound - Properties Group Date first assessed: 12/30/20  -AM Time first assessed: 1037  -AM Location: abdomen  -AM Primary Wound Type: Incision  -AM    Row Name 01/06/21 2005 01/06/21 1600 01/06/21 1400       Wound 12/30/20 1037 abdomen Incision    Wound - Properties Group Placement Date: 12/30/20  -AM Placement Time: 1037  -AM Location: abdomen  -AM Primary Wound Type: Incision  -AM    Dressing Appearance  dry;intact;open to air  -VB  --  --    Closure  BRIANNE  -VB  BRIANNE  -HV  BRIANNE  -HV    Base  dressing in place, unable to visualize  -VB  dressing in place, unable to visualize  -HV  dressing in place, unable to visualize  -HV    Periwound  intact;dry  -VB  intact;dry  -HV  intact;dry  -HV    Retired Wound - Properties Group Date first assessed: 12/30/20  -AM Time first assessed: 1037  -AM Location: abdomen  -AM Primary Wound Type: Incision  -AM    Row Name 01/06/21 1200             Wound 12/30/20 1037 abdomen Incision    Wound - Properties Group Placement Date: 12/30/20  -AM Placement Time: 1037  -AM Location: abdomen  -AM Primary Wound Type: Incision  -AM     Closure  BRIANNE  -HV      Base  dressing in place, unable to visualize  -HV      Periwound  intact;dry  -HV      Retired Wound - Properties Group Date first assessed: 12/30/20  -AM Time first assessed: 1037  -AM Location: abdomen  -AM Primary Wound Type: Incision  -AM      User Key  (r) = Recorded By, (t) = Taken By, (c) = Cosigned By    Initials Name Provider Type    AM Ping Martinez, RN Registered Nurse    Lisette Vazquez RN Registered Nurse    Merle Thomas RN Registered Nurse    Isabella Mariee RN Registered Nurse          Procedures:    Procedure(s):  Laparoscopic hiatal hernia repair with gastropexy          Results Review:     I reviewed the patient's new clinical results.      Lab Results (last 24 hours)     Procedure Component Value Units Date/Time    Magnesium [[id number]]  (Normal) Collected: 01/07/21 0815    Specimen: Blood Updated: 01/07/21 0901     Magnesium 1.9 mg/dL     Basic Metabolic Panel [[id number]]  (Abnormal) Collected: 01/07/21 0815    Specimen: Blood Updated: 01/07/21 0900     Glucose 167 mg/dL      BUN 12 mg/dL      Creatinine 1.13 mg/dL      Sodium 137 mmol/L      Potassium 3.7 mmol/L      Chloride 97 mmol/L      CO2 27.0 mmol/L      Calcium 9.1 mg/dL      eGFR Non African Amer 46 mL/min/1.73      BUN/Creatinine Ratio 10.6     Anion Gap 13.0 mmol/L     Narrative:      GFR Normal >60  Chronic Kidney Disease <60  Kidney Failure <15      Potassium [[id number]]  (Normal) Collected: 01/06/21 1230    Specimen: Blood Updated: 01/06/21 1326     Potassium 3.6 mmol/L         No results found for: HGBA1C        Results from last 7 days   Lab Units 01/02/21  1711   PH, ARTERIAL pH units 7.353   PO2 ART mm Hg 72.0*   PCO2, ARTERIAL mm Hg 48.2*   HCO3 ART mmol/L 26.8     No results found for: LIPASE  Lab Results   Component Value Date    CHOL 374 (H) 01/03/2020    TRIG 311 (H) 01/03/2020    HDL 50 01/03/2020     (H) 01/03/2020       Lab Results   Lab Value Date/Time    FINALDX   12/31/2020 0205     Leukocytosis with absolute atypical lymphocytosis  Anemia  No blasts identified  If CBC indices persist/worsen, consider flow cytometry or other studies as clinically indicated to exclude a lymphoproliferative disorder      FINALDX  02/06/2020 1405     Absolute atypical lymphocytosis.  Anemia.  No blasts identified.  Recommend clinical follow-up and additional studies to include flow cytometry as clinically indicated to exclude CLL/SLL or other lymphoproliferative disorder.         Microbiology Results (last 10 days)     Procedure Component Value - Date/Time    COVID-19,[facility] IN-HOUSE, NP/OP SWAB IN UTM/VTM/SALINE TRANSPORT MEDIA,24 HR TAT - Swab, Nasopharynx [[id number]]  (Normal) Collected: 12/28/20 1227    Lab Status: Final result Specimen: Swab from Nasopharynx Updated: 12/28/20 2046     COVID19 Not Detected    Narrative:      Fact sheet for providers: https://www.fda.gov/media/268239/download     Fact sheet for patients: https://www.fda.gov/media/445991/download    Test performed by PCR.          ECG/EMG Results (most recent)     Procedure Component Value Units Date/Time    ECG 12 Lead [[id number]] Collected: 12/30/20 1314     Updated: 01/02/21 1511     QT Interval 421 ms     Narrative:      HEART RATE= 71  bpm  RR Interval= 844  ms  NC Interval= 172  ms  P Horizontal Axis= -9  deg  P Front Axis= 48  deg  QRSD Interval= 101  ms  QT Interval= 421  ms  QRS Axis= 41  deg  T Wave Axis= 77  deg  - ABNORMAL ECG -  Sinus rhythm With old anterior septal MI  Low voltage, precordial leads  When compared with ECG of 18-Dec-2020 14:29:46,  Poor R wave progression V1 to V3 noted  Electronically Signed By: Fausto Jean (PAVAN) 02-Jan-2021 15:05:56  Date and Time of Study: 2020-12-30 13:14:52    Adult Transthoracic Echo Complete W/ Cont if Necessary Per Protocol [[id number]] Collected: 01/06/21 1415     Updated: 01/06/21 1826     BSA 1.8 m^2      RVIDd 2.5 cm      IVSd 0.89 cm      LVIDd  5.1 cm      LVIDs 2.9 cm      LVPWd 0.93 cm      IVS/LVPW 0.96     FS 42.3 %      EDV(Teich) 122.8 ml      ESV(Teich) 33.1 ml      EF(Teich) 73.1 %      EDV(cubed) 131.2 ml      ESV(cubed) 25.2 ml      EF(cubed) 80.8 %      LV mass(C)d 164.9 grams      LV mass(C)dI 90.0 grams/m^2      SV(Teich) 89.7 ml      SI(Teich) 49.0 ml/m^2      SV(cubed) 106.1 ml      SI(cubed) 57.9 ml/m^2      Ao root diam 3.2 cm      Ao root area 8.0 cm^2      ACS 1.7 cm      asc Aorta Diam 2.3 cm      LVOT diam 2.0 cm      LVOT area 3.0 cm^2      RVOT diam 2.1 cm      RVOT area 3.3 cm^2      EDV(MOD-sp4) 71.6 ml      ESV(MOD-sp4) 18.7 ml      EF(MOD-sp4) 73.8 %      SV(MOD-sp4) 52.9 ml      SI(MOD-sp4) 28.9 ml/m^2      Ao root area (BSA corrected) 1.7     LV Leonard Vol (BSA corrected) 39.1 ml/m^2      LV Sys Vol (BSA corrected) 10.2 ml/m^2      Aortic R-R 0.72 sec      Aortic HR 83.4 BPM      MV E max mindy 148.1 cm/sec      MV A max mindy 117.7 cm/sec      MV E/A 1.3     MV V2 max 125.6 cm/sec      MV max PG 6.3 mmHg      MV V2 mean 87.9 cm/sec      MV mean PG 3.4 mmHg      MV V2 VTI 32.8 cm      MVA(VTI) 2.6 cm^2      MV dec slope 900.2 cm/sec^2      MV dec time 0.16 sec      Ao pk mindy 162.4 cm/sec      Ao max PG 10.6 mmHg      Ao max PG (full) 4.9 mmHg      Ao V2 mean 115.5 cm/sec      Ao mean PG 6.0 mmHg      Ao mean PG (full) 2.7 mmHg      Ao V2 VTI 34.3 cm      EDILMA(I,A) 2.5 cm^2      EDILMA(I,D) 2.5 cm^2      EDILMA(V,A) 2.2 cm^2      EDILMA(V,D) 2.2 cm^2      AI max mindy 441.0 cm/sec      AI max PG 77.8 mmHg      AI dec slope 379.7 cm/sec^2      AI dec time 1.2 sec      AI P1/2t 340.1 msec      LV V1 max PG 5.7 mmHg      LV V1 mean PG 3.2 mmHg      LV V1 max 119.1 cm/sec      LV V1 mean 84.0 cm/sec      LV V1 VTI 27.8 cm      MR max mindy 519.2 cm/sec      MR max .8 mmHg      CO(Ao) 23.0 l/min      CI(Ao) 12.6 l/min/m^2      SV(Ao) 275.9 ml      SI(Ao) 150.6 ml/m^2      CO(LVOT) 7.0 l/min      CI(LVOT) 3.8 l/min/m^2      SV(LVOT) 84.1 ml       SV(RVOT) 62.9 ml      SI(LVOT) 45.9 ml/m^2      PA V2 max 90.0 cm/sec      PA max PG 3.2 mmHg      PA max PG (full) 0.43 mmHg      PA V2 mean 72.3 cm/sec      PA mean PG 2.2 mmHg      PA mean PG (full) 0.66 mmHg      PA V2 VTI 23.7 cm      PVA(I,A) 2.7 cm^2       CV ECHO KAREN - PVA(I,D) 2.7 cm^2       CV ECHO KAREN - PVA(V,A) 3.1 cm^2       CV ECHO KAREN - PVA(V,D) 3.1 cm^2      PA acc time 0.11 sec      PI end-d garry 49.7 cm/sec      PI max garry 236.6 cm/sec      PI max PG 22.4 mmHg      RV V1 max PG 2.8 mmHg      RV V1 mean PG 1.5 mmHg      RV V1 max 83.8 cm/sec      RV V1 mean 57.3 cm/sec      RV V1 VTI 19.0 cm      TR max garry 277.2 cm/sec      RVSP(TR) 33.7 mmHg      RAP systole 3.0 mmHg      PA pr(Accel) 27.6 mmHg      Pulm Sys Garry 66.4 cm/sec      Pulm Leonard Garry 43.5 cm/sec      Pulm S/D 1.5     Qp/Qs 0.75     Pulm A Revs Dur 0.09 sec      Pulm A Revs Garry 29.6 cm/sec       CV ECHO KAREN - BZI_BMI 33.1 kilograms/m^2       CV ECHO KAREN - BSA(HAYCOCK) 1.9 m^2       CV ECHO KAREN - BZI_METRIC_WEIGHT 82.1 kg       CV ECHO KAREN - BZI_METRIC_HEIGHT 157.5 cm      EF(MOD-bp) 74.0 %      LA dimension(2D) 3.9 cm      Echo EF Estimated 65 %     Narrative:      · Estimated left ventricular EF = 65% Estimated left ventricular EF was in   agreement with the calculated left ventricular EF. Left ventricular   systolic function is normal.  · There is mainly affecting the non-coronary and left coronary cusp(s).  · The right ventricular cavity is mildly dilated.  · Estimated right ventricular systolic pressure from tricuspid   regurgitation is normal (<35 mmHg).                  Results for orders placed during the hospital encounter of 12/30/20   Adult Transthoracic Echo Complete W/ Cont if Necessary Per Protocol    Narrative · Estimated left ventricular EF = 65% Estimated left ventricular EF was in   agreement with the calculated left ventricular EF. Left ventricular   systolic function is normal.  · There is mainly  affecting the non-coronary and left coronary cusp(s).  · The right ventricular cavity is mildly dilated.  · Estimated right ventricular systolic pressure from tricuspid   regurgitation is normal (<35 mmHg).          Xr Chest 1 View    Result Date: 1/5/2021  Increased pulmonary edema from previous study. Persistent bibasilar airspace opacities atelectasis versus pneumonia. Small bilateral pleural effusions.  Electronically Signed By-Muriel Spencer MD On:1/5/2021 10:10 PM This report was finalized on [id number] by  Muriel Spencer MD.    Xr Chest 1 View    Result Date: 1/2/2021    1.  Low volume inspiration with persistent left lower lobe airspace consolidation which is unchanged. 2.  Small bilateral pleural effusions.   Electronically Signed By-Jan Vernon MD On:1/2/2021 11:39 AM This report was finalized on [id number] by  Jan Vernon MD.    Xr Chest 1 View    Result Date: 12/31/2020   1. Consolidation in the left lung base which appears new. 2. Lower lung volumes with right basilar atelectasis. 3. Cardiomegaly without acute cardiac decompensation. 4. Right basilar atelectasis.  Electronically Signed By-Den Connell MD On:12/31/2020 9:10 AM This report was finalized on [id number] by  Den Connell MD.          Xrays, labs reviewed personally by physician.    Medication Review:   I have reviewed the patient's current medication list      Scheduled Meds  amLODIPine, 10 mg, Oral, Daily With Lunch  budesonide-formoterol, 2 puff, Inhalation, BID - RT  docusate sodium, 100 mg, Oral, BID  furosemide, 40 mg, Intravenous, BID  guaiFENesin, 600 mg, Oral, Q12H  ipratropium-albuterol, 3 mL, Nebulization, Q4H - RT  levothyroxine, 100 mcg, Oral, QAM AC  methylnaltrexone, 6 mg, Subcutaneous, Every Other Day  metoclopramide, 10 mg, Intravenous, Q6H  pantoprazole, 40 mg, Oral, Q AM  polyethylene glycol, 17 g, Oral, Daily  rosuvastatin, 10 mg, Oral, Nightly  sodium chloride, 10 mL, Intravenous, Q12H        Meds  Infusions  lactated ringers, 9 mL/hr, Last Rate: Stopped (12/30/20 2009)  phenylephrine, 0.5-3 mcg/kg/min        Meds PRN  •  acetaminophen **OR** acetaminophen  •  influenza vaccine  •  lactated ringers  •  magnesium sulfate **OR** magnesium sulfate **OR** magnesium sulfate  •  Morphine  •  [DISCONTINUED] HYDROmorphone **AND** naloxone  •  [DISCONTINUED] Morphine **AND** naloxone  •  ondansetron **OR** ondansetron  •  oxyCODONE  •  phenylephrine  •  potassium chloride **OR** potassium chloride **OR** potassium chloride  •  promethazine **OR** promethazine  •  risperiDONE  •  saline  •  sodium chloride        Assessment/Plan   Assessment/Plan     Active Hospital Problems    Diagnosis  POA   • **Hiatal hernia with gastroesophageal reflux [K21.9, K44.9]  Unknown   • Acute respiratory failure with hypoxia and hypercapnia (CMS/HCC) [J96.01, J96.02]  Unknown   • Abdominal pain [R10.9]  Unknown   • Nocturnal hypoxia [G47.34]  Yes   • LIBBY (obstructive sleep apnea) [G47.33]  Unknown      Resolved Hospital Problems   No resolved problems to display.       MEDICAL DECISION MAKING COMPLEXITY BY PROBLEM:     Acute on chronic hypoxic respiratory failure, LIBBY  LLL PNA  -likely realted to narcotics and LIBBY vs LLL CAP  -CXR 12/31/20: new LLL infiltrate  -CT chest reviewed b/l LL atelectasis vs infiltrate  -Remains on high flow, wean off as tolerated  -compliant with AVAP  -Nebulizers, symbicort  -Finished Zosyn  -encourage aggressive pulmonary toilet  -Repeat chest x-ray on 1/6 shows bilateral congestion and atelectasis  -Continue on Lasix IV twice daily, monitor I's and O's  -Echo shows EF of 65% and no diastolic dysfunction  -pulmonary following    Hiatal hernia with GERD   Possible postoperative ileus, resolved  -S/p laparoscopic hiatal hernia repair with gastropexy  -General surgery primary postop per them  -PPI, carafate, reglan prn  -Diet and bowel regimen per surgery      Hypertension  -Continue home meds  -Monitor and  adjust blood pressure prn    CLL  -WBC chronically elevated,(baseline 15-20 range)  -Follow CBC while here    DVT prophylaxis  -Per primary      VTE Prophylaxis -   Mechanical Order History:      Ordered        12/30/20 1513  Place Sequential Compression Device  Once         12/30/20 1513  Place Sequential Compression Device  Once         12/30/20 1513  Maintain Sequential Compression Device  Continuous                 Pharmalogical Order History:     None            Code Status -   Code Status and Medical Interventions:   Ordered at: 12/30/20 1513     Code Status:    CPR     Medical Interventions (Level of Support Prior to Arrest):    Full       This patient has been examined wearing appropriate Personal Protective Equipment. 01/07/21        Discharge Planning  Respiratory status will have to improve before d/c         Electronically signed by Meg Lund DO, 01/07/21, 11:41 EST.  [facility]ist Team

## 2021-01-07 NOTE — THERAPY TREATMENT NOTE
Patient Name: Diane Guan  : 1940    MRN: [id number]                              Today's Date: 2021       Admit Date: 2020    Visit Dx:     ICD-10-CM ICD-9-CM   1. Hiatal hernia with gastroesophageal reflux  K21.9 530.81    K44.9 553.3     Patient Active Problem List   Diagnosis   • Abnormality of gait   • Mixed anxiety and depressive disorder   • Primary osteoarthritis involving multiple joints   • Breast cancer (CMS/[facility])   • Chronic lymphoid leukemia (CMS/[facility])   • Depression   • Gallbladder disorder   • Hiatal hernia with gastroesophageal reflux   • Mixed hyperlipidemia   • Essential hypertension   • Acquired hypothyroidism   • Type 2 diabetes mellitus without complication, without long-term current use of insulin (CMS/[facility])   • Insomnia   • Postmenopausal status   • Shoulder pain   • Overactive bladder   • Vitamin B 12 deficiency   • Seasonal allergies   • Absolute anemia   • Vitamin D deficiency   • LIBBY (obstructive sleep apnea)   • Nocturnal hypoxia   • Acute respiratory failure with hypoxia and hypercapnia (CMS/[facility])   • Abdominal pain     Past Medical History:   Diagnosis Date   • Acute bronchitis due to human metapneumovirus 2020   • Anxiety disorder    • Arthritis    • Breast cancer (CMS/[facility]) 2019    Invasive ductal carcinoma   • Chronic lymphocytic leukemia (CLL), B-cell (CMS/[facility]) 2019   • Depression    • Disease of thyroid gland    • Diverticulosis of colon 2018    Identified on colonoscopy   • Dysphagia 2020   • Dyspnea on exertion    • Hemorrhoid    • Hiatal hernia 2020   • Hiatal hernia with GERD     large hiatal hernia with high-grade reflux on barium swallow; s/p laparoscopic fundoplication with gastropexy   • History of diabetes mellitus     no meds now   • Hyperlipidemia    • Hypertension    • Mycobacterium avium complex (CMS/[facility]) 2019    aspiration pneumonia; completed abx therapy   • OAB (overactive bladder)    • Sleep apnea     daughter states pt does not  have and doesn't have machine     Past Surgical History:   Procedure Laterality Date   • BLADDER SURGERY     • BRONCHOSCOPY N/A 9/13/2019    Procedure: BRONCHOSCOPY with bronchial washing;  Surgeon: Marnie Frankel MD;  Location: [facility] ENDOSCOPY;  Service: Pulmonary   • COLONOSCOPY  07/19/2018    severe diverticulosis   • CYST REMOVAL      Removed a fatty cyst off of her back   • ENDOSCOPY N/A 11/23/2020    Procedure: ESOPHAGOGASTRODUODENOSCOPY with dilatation (Bougie # 48, 50, 52, 54, 56, 58);  Surgeon: Den Plummer DO;  Location: [facility] ENDOSCOPY;  Service: General;  Laterality: N/A;  Post: large hiatal hernia, joshua's ulcers   • HIATAL HERNIA REPAIR N/A 12/30/2020    Procedure: Laparoscopic hiatal hernia repair with gastropexy;  Surgeon: Den Plummer DO;  Location: [facility] MAIN OR;  Service: General;  Laterality: N/A;   • MASTECTOMY Right 02/04/2019    Invasive ductal carcinoma   • TUBAL ABDOMINAL LIGATION       General Information     Row Name 01/07/21 1552          Physical Therapy Time and Intention    Document Type  therapy note (daily note)  -     Mode of Treatment  physical therapy  -     Row Name 01/07/21 1552          Cognition    Orientation Status (Cognition)  oriented x 3  -       User Key  (r) = Recorded By, (t) = Taken By, (c) = Cosigned By    Initials Name Provider Type     Jess Marion PTA Physical Therapy Assistant        Mobility     Row Name 01/07/21 1554          Bed Mobility    Bed Mobility  bed mobility (all) activities  -     All Activities, [place] (Bed Mobility)  supervision;verbal cues;1 person assist  -     Assistive Device (Bed Mobility)  bed rails;head of bed elevated  -     Comment (Bed Mobility)  Vcs due to impulsiveness and also [place].  -     Row Name 01/07/21 1558          Sit-Stand Transfer    Sit-Stand [place] (Transfers)  verbal cues;contact guard;1 person to manage equipment  -     Assistive Device (Sit-Stand Transfers)  walker, front-wheeled   -     Row Name 01/07/21 1554          Gait/Stairs (Locomotion)    [place] Level (Gait)  contact guard;1 person assist;1 person to manage equipment  -     Assistive Device (Gait)  walker, front-wheeled  -     Distance in Feet (Gait)  40'  -     Deviations/Abnormal Patterns (Gait)  jey decreased  -     Bilateral Gait Deviations  forward flexed posture;heel strike decreased  -     Comment (Gait/Stairs)  Weakness present, ff posture, no lob, assist c precision flow duirng amb. Unable to accept challenge away from midline.  -       User Key  (r) = Recorded By, (t) = Taken By, (c) = Cosigned By    Initials Name Provider Type     Jess Marion PTA Physical Therapy Assistant        Obj/Interventions     [facility] Name 01/07/21 1557          Balance    Static Sitting Balance  WFL  -     Dynamic Sitting Balance  mild impairment Unable to awilda B socks due to stomach pain.  -     Static Standing Balance  WFL  -     Dynamic Standing Balance  mild impairment  -     Comment, Balance  Limited away from midline, unable to accept challenge.  -       User Key  (r) = Recorded By, (t) = Taken By, (c) = Cosigned By    Initials Name Provider Type     Jess Marion PTA Physical Therapy Assistant        Goals/Plan    No documentation.       Clinical Impression     [facility] Name 01/07/21 1559          Pain    Additional Documentation  Pain Scale: Numbers Pre/Post-Treatment (Group)  -[facility] Name 01/07/21 1559          Pain Scale: Numbers Pre/Post-Treatment    Pretreatment Pain Rating  2/10  -     Posttreatment Pain Rating  2/10  -     Pain Location  abdomen  -[facility] Name 01/07/21 1559          Plan of Care Review    Plan of Care Reviewed With  patient;daughter  -     Progress  improving  -     Outcome Summary  Required sba/cga for safe, functional mobility. Amb. 40' in room using rwx., no lob, vcs for safety c rwx. use. Weakness present, ff posture, no lob, assist c precision flow duirng amb. Unable  to accept challenge away from midline.Low activity tolerance. Pt. reports no rehab/HH, has family to assist her. Will progress as able, presents safe to d/c home and reports having all DME needed.  -     Row Name 01/07/21 1559          Vital Signs    Pre SpO2 (%)  93  -     O2 Delivery Pre Treatment  supplemental O2  -LH     Intra SpO2 (%)  100  -LH     O2 Delivery Intra Treatment  supplemental O2  -LH     Post SpO2 (%)  99  -LH     O2 Delivery Post Treatment  supplemental O2  -LH     Pre Patient Position  Supine  -     Intra Patient Position  Standing  -     Post Patient Position  Supine  -     Row Name 01/07/21 1559          Positioning and Restraints    Pre-Treatment Position  in bed  -LH     Post Treatment Position  bed  -LH     In Bed  fowlers  -       User Key  (r) = Recorded By, (t) = Taken By, (c) = Cosigned By    Initials Name Provider Type     Jess Marion PTA Physical Therapy Assistant        Outcome Measures     Row Name 01/07/21 1604          How much help from another person do you currently need...    Turning from your back to your side while in flat bed without using bedrails?  4  -LH     Moving from lying on back to sitting on the side of a flat bed without bedrails?  4  -LH     Moving to and from a bed to a chair (including a wheelchair)?  3  -LH     Standing up from a chair using your arms (e.g., wheelchair, bedside chair)?  3  -LH     Climbing 3-5 steps with a railing?  3  -LH     To walk in hospital room?  3  -LH     AM-PAC 6 Clicks Score (PT)  20  -       User Key  (r) = Recorded By, (t) = Taken By, (c) = Cosigned By    Initials Name Provider Type     Jess Marion PTA Physical Therapy Assistant        Physical Therapy Education                 Title: PT OT SLP Therapies (Done)     Topic: Physical Therapy (Done)     Point: Mobility training (Done)     Learning Progress Summary           Patient Acceptance, D,E, DU,NR by  at 1/7/2021 1604    Comment: Reinforced safe hand  placement c rwx. use and transfers.    Acceptance, E,TB, VU by  at 1/5/2021 1325    Acceptance, E,TB, VU by SC at 1/4/2021 1905    Acceptance, E,TB, VU by  at 1/3/2021 1712    Acceptance, E,TB, VU by MI at 12/31/2020 1121                   Point: Precautions (Done)     Learning Progress Summary           Patient Acceptance, D,E, DU,NR by  at 1/7/2021 1604    Comment: Reinforced safe hand placement c rwx. use and transfers.    Acceptance, E,TB, VU by  at 1/5/2021 1325    Acceptance, E,TB, VU by SC at 1/4/2021 1905    Acceptance, E,TB, VU by MI at 12/31/2020 1121                               User Key     Initials Effective Dates Name Provider Type Discipline     03/01/19 -  Kinga Delatorre, PT Physical Therapist PT    SC 03/01/19 -  Mya Lawton, PTA Physical Therapy Assistant PT     03/01/19 -  Jess Marion PTA Physical Therapy Assistant PT     06/23/20 -  Chencho Rojas, PT Physical Therapist PT    MI 03/01/19 -  Ubaldo Elder, RN Registered Nurse Nurse              PT Recommendation and Plan     Plan of Care Reviewed With: patient, daughter  Progress: improving  Outcome Summary: Required sba/cga for safe, functional mobility. Amb. 40' in room using rwx., no lob, vcs for safety c rwx. use. Weakness present, ff posture, no lob, assist c precision flow duirng amb. Unable to accept challenge away from midline.Low activity tolerance. Pt. reports no rehab/HH, has family to assist her. Will progress as able, presents safe to d/c home and reports having all DME needed.     Time Calculation:   PT Charges     Row Name 01/07/21 1610             Time Calculation    Start Time  0205  -      Stop Time  0235  -      Time Calculation (min)  30 min  -      PT Received On  01/07/21  -      PT - Next Appointment  01/08/21  -         Time Calculation- PT    Total Timed Code Minutes- PT  30 minute(s)  -         Timed Charges    10950 - Gait Training Minutes   15  -      63974 - PT Therapeutic Activity Minutes   15  -        User Key  (r) = Recorded By, (t) = Taken By, (c) = Cosigned By    Initials Name Provider Type     Jess Marion PTA Physical Therapy Assistant        Therapy Charges for Today     Code Description Service Date Service Provider Modifiers Qty    01758947315 HC GAIT TRAINING EA 15 MIN 1/7/2021 Jess Marion PTA GP 1    51446286132 HC PT THERAPEUTIC ACT EA 15 MIN 1/7/2021 Jess Marion PTA GP 1          PT G-Codes  Outcome Measure Options: AM-PAC 6 Clicks Basic Mobility (PT)  AM-PAC 6 Clicks Score (PT): 20    Jess Marion PTA  1/7/2021

## 2021-01-08 LAB
ANION GAP SERPL CALCULATED.3IONS-SCNC: 13 MMOL/L (ref 5–15)
BUN SERPL-MCNC: 16 MG/DL (ref 8–23)
BUN/CREAT SERPL: 15.1 (ref 7–25)
CALCIUM SPEC-SCNC: 8.3 MG/DL (ref 8.6–10.5)
CHLORIDE SERPL-SCNC: 99 MMOL/L (ref 98–107)
CO2 SERPL-SCNC: 27 MMOL/L (ref 22–29)
CREAT SERPL-MCNC: 1.06 MG/DL (ref 0.57–1)
GFR SERPL CREATININE-BSD FRML MDRD: 50 ML/MIN/1.73
GLUCOSE SERPL-MCNC: 114 MG/DL (ref 65–99)
MAGNESIUM SERPL-MCNC: 1.2 MG/DL (ref 1.6–2.4)
POTASSIUM SERPL-SCNC: 3.4 MMOL/L (ref 3.5–5.2)
SODIUM SERPL-SCNC: 139 MMOL/L (ref 136–145)

## 2021-01-08 PROCEDURE — 83735 ASSAY OF MAGNESIUM: CPT | Performed by: INTERNAL MEDICINE

## 2021-01-08 PROCEDURE — 25010000002 METHYLNALTREXONE 12 MG/0.6ML SOLUTION: Performed by: SURGERY

## 2021-01-08 PROCEDURE — 94799 UNLISTED PULMONARY SVC/PX: CPT

## 2021-01-08 PROCEDURE — 25010000002 MORPHINE PER 10 MG: Performed by: INTERNAL MEDICINE

## 2021-01-08 PROCEDURE — 80048 BASIC METABOLIC PNL TOTAL CA: CPT | Performed by: INTERNAL MEDICINE

## 2021-01-08 PROCEDURE — 25010000002 FUROSEMIDE PER 20 MG: Performed by: INTERNAL MEDICINE

## 2021-01-08 PROCEDURE — 25010000002 METOCLOPRAMIDE PER 10 MG: Performed by: SURGERY

## 2021-01-08 PROCEDURE — 99232 SBSQ HOSP IP/OBS MODERATE 35: CPT | Performed by: INTERNAL MEDICINE

## 2021-01-08 PROCEDURE — 97110 THERAPEUTIC EXERCISES: CPT

## 2021-01-08 PROCEDURE — 97116 GAIT TRAINING THERAPY: CPT

## 2021-01-08 PROCEDURE — 25010000002 MAGNESIUM SULFATE 2 GM/50ML SOLUTION: Performed by: INTERNAL MEDICINE

## 2021-01-08 RX ORDER — RISPERIDONE 0.5 MG/1
0.25 TABLET, ORALLY DISINTEGRATING ORAL ONCE
Status: COMPLETED | OUTPATIENT
Start: 2021-01-08 | End: 2021-01-08

## 2021-01-08 RX ORDER — POTASSIUM CHLORIDE 20 MEQ/1
40 TABLET, EXTENDED RELEASE ORAL ONCE
Status: COMPLETED | OUTPATIENT
Start: 2021-01-08 | End: 2021-01-08

## 2021-01-08 RX ADMIN — RISPERIDONE 0.25 MG: 0.5 TABLET, ORALLY DISINTEGRATING ORAL at 08:27

## 2021-01-08 RX ADMIN — LEVOTHYROXINE SODIUM 100 MCG: 125 TABLET ORAL at 06:33

## 2021-01-08 RX ADMIN — POTASSIUM CHLORIDE 40 MEQ: 1500 TABLET, EXTENDED RELEASE ORAL at 08:27

## 2021-01-08 RX ADMIN — OXYCODONE 5 MG: 5 TABLET ORAL at 23:51

## 2021-01-08 RX ADMIN — METOCLOPRAMIDE HYDROCHLORIDE 10 MG: 5 INJECTION INTRAMUSCULAR; INTRAVENOUS at 06:33

## 2021-01-08 RX ADMIN — IPRATROPIUM BROMIDE AND ALBUTEROL SULFATE 3 ML: 2.5; .5 SOLUTION RESPIRATORY (INHALATION) at 19:31

## 2021-01-08 RX ADMIN — AMLODIPINE BESYLATE 10 MG: 5 TABLET ORAL at 13:52

## 2021-01-08 RX ADMIN — RISPERIDONE 0.25 MG: 0.5 TABLET, ORALLY DISINTEGRATING ORAL at 20:11

## 2021-01-08 RX ADMIN — DOCUSATE SODIUM 100 MG: 100 CAPSULE, LIQUID FILLED ORAL at 08:27

## 2021-01-08 RX ADMIN — IPRATROPIUM BROMIDE AND ALBUTEROL SULFATE 3 ML: 2.5; .5 SOLUTION RESPIRATORY (INHALATION) at 07:11

## 2021-01-08 RX ADMIN — BUDESONIDE AND FORMOTEROL FUMARATE DIHYDRATE 2 PUFF: 160; 4.5 AEROSOL RESPIRATORY (INHALATION) at 19:31

## 2021-01-08 RX ADMIN — METOCLOPRAMIDE HYDROCHLORIDE 10 MG: 5 INJECTION INTRAMUSCULAR; INTRAVENOUS at 13:52

## 2021-01-08 RX ADMIN — FUROSEMIDE 40 MG: 10 INJECTION, SOLUTION INTRAMUSCULAR; INTRAVENOUS at 08:27

## 2021-01-08 RX ADMIN — IPRATROPIUM BROMIDE AND ALBUTEROL SULFATE 3 ML: 2.5; .5 SOLUTION RESPIRATORY (INHALATION) at 15:08

## 2021-01-08 RX ADMIN — IPRATROPIUM BROMIDE AND ALBUTEROL SULFATE 3 ML: 2.5; .5 SOLUTION RESPIRATORY (INHALATION) at 03:18

## 2021-01-08 RX ADMIN — GUAIFENESIN 600 MG: 600 TABLET, EXTENDED RELEASE ORAL at 20:06

## 2021-01-08 RX ADMIN — Medication 10 ML: at 08:29

## 2021-01-08 RX ADMIN — IPRATROPIUM BROMIDE AND ALBUTEROL SULFATE 3 ML: 2.5; .5 SOLUTION RESPIRATORY (INHALATION) at 23:59

## 2021-01-08 RX ADMIN — METHYLNALTREXONE BROMIDE 6 MG: 12 INJECTION, SOLUTION SUBCUTANEOUS at 08:26

## 2021-01-08 RX ADMIN — IPRATROPIUM BROMIDE AND ALBUTEROL SULFATE 3 ML: 2.5; .5 SOLUTION RESPIRATORY (INHALATION) at 11:10

## 2021-01-08 RX ADMIN — PANTOPRAZOLE SODIUM 40 MG: 40 TABLET, DELAYED RELEASE ORAL at 06:33

## 2021-01-08 RX ADMIN — METOCLOPRAMIDE HYDROCHLORIDE 10 MG: 5 INJECTION INTRAMUSCULAR; INTRAVENOUS at 18:20

## 2021-01-08 RX ADMIN — MORPHINE SULFATE 2 MG: 4 INJECTION INTRAVENOUS at 00:15

## 2021-01-08 RX ADMIN — RISPERIDONE 0.25 MG: 0.5 TABLET, ORALLY DISINTEGRATING ORAL at 13:53

## 2021-01-08 RX ADMIN — MAGNESIUM SULFATE HEPTAHYDRATE 2 G: 40 INJECTION, SOLUTION INTRAVENOUS at 08:24

## 2021-01-08 RX ADMIN — ROSUVASTATIN CALCIUM 10 MG: 10 TABLET, FILM COATED ORAL at 20:07

## 2021-01-08 RX ADMIN — METOCLOPRAMIDE HYDROCHLORIDE 10 MG: 5 INJECTION INTRAMUSCULAR; INTRAVENOUS at 23:40

## 2021-01-08 RX ADMIN — FUROSEMIDE 40 MG: 10 INJECTION, SOLUTION INTRAMUSCULAR; INTRAVENOUS at 18:21

## 2021-01-08 RX ADMIN — Medication 10 ML: at 20:07

## 2021-01-08 RX ADMIN — GUAIFENESIN 600 MG: 600 TABLET, EXTENDED RELEASE ORAL at 08:26

## 2021-01-08 RX ADMIN — DOCUSATE SODIUM 100 MG: 100 CAPSULE, LIQUID FILLED ORAL at 20:06

## 2021-01-08 RX ADMIN — BUDESONIDE AND FORMOTEROL FUMARATE DIHYDRATE 2 PUFF: 160; 4.5 AEROSOL RESPIRATORY (INHALATION) at 07:14

## 2021-01-08 NOTE — PROGRESS NOTES
Nutrition Services    Patient Name: Diane Guan  YOB: 1940  MRN: [id number]  Admission date: 12/30/2020    Comment:    Patient has been NPO/Liquid diet x 8 days, however only on day #4 of full liquid diet. Continue with Boost Plus TID     PPE Documentation        PPE Worn By Provider Mask & Eye protection    PPE Worn By Patient  Patient-None, Family- Mask      CLINICAL NUTRITION ASSESSMENT      Reason for Assessment Follow up protocol     1/4: NPO/Liquid diet x 5      H&P:      Past Medical History:   Diagnosis Date   • Acute bronchitis due to human metapneumovirus 2/8/2020   • Anxiety disorder    • Arthritis    • Breast cancer (CMS/[facility]) 02/2019    Invasive ductal carcinoma   • Chronic lymphocytic leukemia (CLL), B-cell (CMS/[facility]) 01/2019   • Depression    • Disease of thyroid gland    • Diverticulosis of colon 2018    Identified on colonoscopy   • Dysphagia 12/2020   • Dyspnea on exertion    • Hemorrhoid    • Hiatal hernia 12/2020   • Hiatal hernia with GERD     large hiatal hernia with high-grade reflux on barium swallow; s/p laparoscopic fundoplication with gastropexy   • History of diabetes mellitus     no meds now   • Hyperlipidemia    • Hypertension    • Mycobacterium avium complex (CMS/[facility]) 02/2019    aspiration pneumonia; completed abx therapy   • OAB (overactive bladder)    • Sleep apnea     daughter states pt does not have and doesn't have machine       Past Surgical History:   Procedure Laterality Date   • BLADDER SURGERY     • BRONCHOSCOPY N/A 9/13/2019    Procedure: BRONCHOSCOPY with bronchial washing;  Surgeon: Marnie Frankel MD;  Location: [facility] ENDOSCOPY;  Service: Pulmonary   • COLONOSCOPY  07/19/2018    severe diverticulosis   • CYST REMOVAL      Removed a fatty cyst off of her back   • ENDOSCOPY N/A 11/23/2020    Procedure: ESOPHAGOGASTRODUODENOSCOPY with dilatation (Bougie # 48, 50, 52, 54, 56, 58);  Surgeon: Den Plummer DO;  Location: [facility] ENDOSCOPY;  Service: General;   "Laterality: N/A;  Post: large hiatal hernia, joshua's ulcers   • HIATAL HERNIA REPAIR N/A 12/30/2020    Procedure: Laparoscopic hiatal hernia repair with gastropexy;  Surgeon: Den Plummer DO;  Location: [facility] MAIN OR;  Service: General;  Laterality: N/A;   • MASTECTOMY Right 02/04/2019    Invasive ductal carcinoma   • TUBAL ABDOMINAL LIGATION          Current Problems:   Acute on chronic hypoxic respiratory failure, LIBBY  LLL PNA  -pulmonary following     Hiatal hernia with GERD   Possible postoperative ileus  -12/30: S/p laparoscopic hiatal hernia repair with gastropexy  -General surgery following      Hypertension     CLL  -WBC chronically elevated,(baseline 15-20 range)       Nutrition/Diet History         Narrative     1/8: Visited pt today, who is anxious to have diet advanced. Per Dr. Plummer \"Needs 10 days of full liquids before moving up to soft foods.\" Today makes Day #8 NPO/Liquid diet status, however today makes day only #4 of FLD. Is taking Boost at times, spoke with RN who states pt refuses Boost occasionally.     1/4: At pt visit today, pt was extremely [place] & falling asleep at interview. Pt's sister at bedside & able to answer questions. She reports patients last meal was 1 day PTA. Prior to this point was eating very well & not losing weight. She thinks her sister would be open to ONS. Pt appears well nourished.      Functional Status PLOF= independent. Per PT able to ambulate with rolling walker. Anticipate DC home with HHPT.      Food Allergies NKFA      Factors Affecting   Nutritional Intake Recent surgery      Anthropometrics        Current Height, Weight Height: 157.5 cm (62\")  Weight: 85.4 kg (188 lb 4.4 oz) (01/08/21 0551)        Admit Height, Weight -    Flowsheet Rows      First Filed Value   Admission Height  157.5 cm (62\") Documented at 12/16/2020 1444   Admission Weight  81.6 kg (180 lb) Documented at 12/16/2020 1444             Ideal Body Weight (IBW) 110 lbs    % Ideal Body Weight " 167%        Usual Body Weight 180 lbs per pt's sister    % Usual Body Weight 104%    Wt Change Observation 1/8: Pt is up 8 lbs since admission, ? Accuracy     1/4: Upon initial weight review, wt fairly stable x 5 years    Weight Hx    Wt Readings from Last 30 Encounters:   01/08/21 0551 85.4 kg (188 lb 4.4 oz)   01/07/21 0529 83.9 kg (184 lb 15.5 oz)   01/06/21 1414 82.1 kg (181 lb)   01/06/21 0536 82.5 kg (181 lb 14.1 oz)   01/05/21 0500 83.9 kg (184 lb 15.5 oz)   01/04/21 0506 83.9 kg (184 lb 15.5 oz)   01/03/21 0525 87.3 kg (192 lb 7.4 oz)   12/31/20 0534 83.6 kg (184 lb 4.9 oz)   12/16/20 1444 81.6 kg (180 lb)   12/03/20 1423 85.7 kg (189 lb)   11/23/20 0646 85.6 kg (188 lb 11.4 oz)   11/16/20 1200 85.3 kg (188 lb)   11/05/20 1427 85.3 kg (188 lb)   08/18/20 0949 85.3 kg (188 lb)   05/15/20 0818 83.5 kg (184 lb)   02/14/20 1042 81.6 kg (179 lb 12.8 oz)   02/12/20 0523 83 kg (183 lb)   02/11/20 0451 83.3 kg (183 lb 9.6 oz)   02/08/20 0330 80.1 kg (176 lb 9.4 oz)   02/07/20 0347 80.7 kg (177 lb 14.6 oz)   02/06/20 1248 81.5 kg (179 lb 10.8 oz)   02/04/20 1505 83 kg (183 lb)   01/03/20 1110 83.7 kg (184 lb 9.6 oz)   09/13/19 0730 80.5 kg (177 lb 7.5 oz)   09/12/19 0908 77.1 kg (170 lb)   06/25/19 1116 75.8 kg (167 lb)   04/04/19 0857 73 kg (161 lb)   01/24/19 1502 77.8 kg (171 lb 9.6 oz)   12/27/18 1118 77.6 kg (171 lb)   07/16/18 0907 77.1 kg (170 lb)   02/01/18 0929 80.3 kg (177 lb)   01/17/18 0915 80.7 kg (178 lb)   08/07/17 1256 82.1 kg (181 lb)   04/24/17 1355 83.5 kg (184 lb)   03/27/17 1443 82.6 kg (182 lb)   01/17/17 1315 83.5 kg (184 lb)   07/18/16 1344 81.2 kg (179 lb)   05/10/16 0856 82.1 kg (181 lb)        BMI kg/m2 Body mass index is 34.44 kg/m².     Labs/Medications         Pertinent Labs -   Results from last 7 days   Lab Units 01/08/21  0410 01/07/21  0815 01/06/21  1230 01/06/21  0907   SODIUM mmol/L 139 137  --  138   POTASSIUM mmol/L 3.4* 3.7 3.6 3.3*   CHLORIDE mmol/L 99 97*  --  102   CO2  mmol/L 27.0 27.0  --  25.0   BUN mg/dL 16 12  --  9   CREATININE mg/dL 1.06* 1.13*  --  0.90   CALCIUM mg/dL 8.3* 9.1  --  9.0   GLUCOSE mg/dL 114* 167*  --  191*     Results from last 7 days   Lab Units 01/08/21  0410 01/07/21  0815 01/06/21  0907   MAGNESIUM mg/dL 1.2* 1.9 1.3*   HEMOGLOBIN g/dL  --   --  10.1*   HEMATOCRIT %  --   --  31.1*     COVID19   Date Value Ref Range Status   12/28/2020 Not Detected Not Detected - Ref. Range Final     Lab Results   Component Value Date    HGBA1C 6.1 (H) 08/18/2020         Pertinent Medications Colace, Lasix, Synthroid, Relistor, Reglan, Protonix, Miralax, Crestor, Risperidone      Physical Findings        Overall Physical   Appearance, MSA 1/8: NFPE completed, continues to appear well nourished     1/4: Pt visually appeared well nourished    --  Edema  None documented      Gastrointestinal + BM x 3 on 1/7/21      Tubes No feeding tubes      Oral/Mouth Cavity 1/4: Pt's sister denies pt has any issues w/ chewing or swallowing      Skin Abdominal surgical incision      --  Current Nutrition Orders & Evaluation of Intake       Oral Nutrition     Current PO Diet Diet Liquids; Full Liquid   Supplement Boost Plus TID    PO Evaluation     % PO Intake 1/8: 100% of full liquid diet     1/4: Intakes recorded reflect CLD intakes    --  Clinical Course    Nutrition Course Details PO diet    12/30-12/31 FLD  1/1-1/2 NPO  1/3 CLD  1/4 to present (1/8) FLD      Nutritional Risk Screening        NRS-2002 Score   -       Nutrition Diagnosis         Nutrition Dx Problem 1 Inadequate oral intakes r/t hiatal hernia repair AEB NPO/Liquid diet x 8 days.       Nutrition Dx Problem 2 -       Intervention Goal         Intervention Goal(s) Diet to advance      Nutrition Intervention        RD/Tech Action Continue with ONS       Nutrition Prescription          Diet Prescription FLD    Supplement Prescription Boost Plus TID    --  Monitor/Evaluation        Monitor Weights, intakes, labs, BM, skin and  medication changes      Electronically signed by:  Estefania Barba RD  01/08/21 14:44 EST

## 2021-01-08 NOTE — PROGRESS NOTES
[facility] Daily Progress Note      Hospitalist Team  LOS 8 days      Patient Care Team:  Jemima Fontaine MD as PCP - General (Family Medicine)    Patient Location: 2105/1      Subjective   Subjective     Chief Complaint / Subjective  No chief complaint on file.  follow up shortness of breath       Brief Synopsis of Hospital Course/HPI     80-year-old female who was having issues with GERD was found to have hiatal hernia and is underwent fixation per general surgery.  She has been admitted postoperatively medicine has been consulted to help with her medical problems.  Her blood pressure stable she is having some apnea spells from the sedation but overall her breathing is stable.  No other issues.  As reported she is slightly sedated from the procedure and HPI was slightly limited.         Date::    12/31/20: had worsening respiratory status last night and has been on HFNC this am. Refusing to wear BIPAP although likely needs it fo LIBBY.    1/1/20: on BIPAP this am and thus HPI limited. Events of overnight noted was moved to higher level of care. O2 is stable today. Seems O2 worse when not deep breathing and when sleeping as has LIBBY.     1/2/21: no real improvement. Having issues with pain medication and sedation not allowing pulmonary toilet. Will not wear BIPAP    1/3/21: Slight improvement today.  Per the nurse she had been using her incentive spirometer and been more ambulatory.  Encourage patient with pulmonology at bedside to continue to increase activity    1/4/2021: Patient seen and examined this morning.  Remains short of breath and on high flow.  Encouraged to ambulate more and use examination amatory, patient agrees.  No other complaints or acute events overnight per nursing.    1/5/2021: Patient seen this morning.  Still the same, shortness of breath the same.  Remains on high flow.  No other complaints.    1/6/2021: Patient seen and examined this morning.   "Feeling slightly better but not significantly improved.  Remains on high flow.  Chest x-ray noted to have bilateral congestion, started on Lasix, echo ordered.    1/7/2021: Patient seen examined this morning.  Feeling a little anxious today but shortness of breath has improved.  Oxygen requirement improving.  No acute events overnight per nursing.     1/8/2021: Patient seen and examined this morning.  Patient feels significant improvement today, weaned off with precision flow and now on high flow at 12 L now.  Only complaint  She wants to eat some solid food and not be on liquid diet anymore.  Discussed with nursing to check with surgery to advance diet if possible today.    Denies fever, chills, chest pain, nausea, vomiting, diarrhea, dysuria, or dizziness.    Review of Systems   All other systems reviewed and are negative.        Objective   Objective      Vital Signs  Temp:  [97.7 °F (36.5 °C)-98.4 °F (36.9 °C)] 98.1 °F (36.7 °C)  Heart Rate:  [] 82  Resp:  [16-22] 20  BP: (131-146)/(55-70) 132/56  Oxygen Therapy  SpO2: 97 %  Pulse Oximetry Type: Continuous  Device (Oxygen Therapy): high-flow nasal cannula(precision flow on standby.)  $ High Flow Nasal Cannula Set-Up: yes  Flow (L/min): (S) 12(decreased)  Oxygen Concentration (%): 80  Oximetry Probe Site Changed: Yes  Flowsheet Rows      First Filed Value   Admission Height  157.5 cm (62\") Documented at 12/16/2020 1444   Admission Weight  81.6 kg (180 lb) Documented at 12/16/2020 1444        Intake & Output (last 3 days)       01/05 0701 - 01/06 0700 01/06 0701 - 01/07 0700 01/07 0701 - 01/08 0700 01/08 0701 - 01/09 0700    P.O. 360  800 600    Total Intake(mL/kg) 360 (4.4)  800 (9.4) 600 (7)    Urine (mL/kg/hr) 125 (0.1) 2010 (1) 900 (0.4) 400 (1.1)    Stool 0 0 0     Total Output 125 2010 900 400    Net +235 -2010 -100 +200            Urine Unmeasured Occurrence 4 x 2 x 1 x     Stool Unmeasured Occurrence 1 x 3 x 3 x         Lines, Drains & Airways  "   Active LDAs     Name:   Placement date:   Placement time:   Site:   Days:    Peripheral IV 12/31/20 0938 Anterior;Left Forearm   12/31/20    0938    Forearm   less than 1                  Physical Exam:    General: Awake, alert, elderly female, NAD  Eyes: PERRL, EOMI, conjunctive are clear  Cardiovascular: Regular rate and rhythm, no murmurs  Respiratory: Decreased breath sounds bilaterally, no wheezing or rales, unlabored breathing  Abdomen: Soft, tender around incision sites, positive bowel sounds, no guarding  Neurologic: A&O, CN grossly intact, moves all extremities spontaneously  Musculoskeletal: Normal range of motion, no deformities  Skin: Warm, dry, intact         Wounds (last 24 hours)      LDA Wound     Row Name 01/08/21 0800 01/08/21 0400 01/08/21 0000       Wound 12/30/20 1037 abdomen Incision    Wound - Properties Group Placement Date: 12/30/20  -AM Placement Time: 1037  -AM Location: abdomen  -AM Primary Wound Type: Incision  -AM    Closure  BRIANNE  -RH  BRIANNE  -JS  BRIANNE  -JS    Base  dressing in place, unable to visualize  -RH  dressing in place, unable to visualize  -JS  dressing in place, unable to visualize  -JS    Periwound  intact;dry  -RH  intact;dry  -JS  intact;dry  -JS    Retired Wound - Properties Group Date first assessed: 12/30/20  -AM Time first assessed: 1037  -AM Location: abdomen  -AM Primary Wound Type: Incision  -AM    Row Name 01/07/21 2000 01/07/21 1600 01/07/21 1205       Wound 12/30/20 1037 abdomen Incision    Wound - Properties Group Placement Date: 12/30/20  -AM Placement Time: 1037  -AM Location: abdomen  -AM Primary Wound Type: Incision  -AM    Closure  BRIANNE  -JS  BRIANNE  -RH  BRIANNE  -RH    Base  dressing in place, unable to visualize  -JS  dressing in place, unable to visualize  -RH  dressing in place, unable to visualize  -RH    Periwound  intact;dry  -JS  intact;dry  -RH  intact;dry  -RH    Periwound Care  dry periwound area maintained  -JS  --  --    Retired Wound - Properties  Group Date first assessed: 12/30/20  -AM Time first assessed: 1037  -AM Location: abdomen  -AM Primary Wound Type: Incision  -AM      User Key  (r) = Recorded By, (t) = Taken By, (c) = Cosigned By    Initials Name Provider Type    AM Ping Martinez, RN Registered Nurse    Anjum Alfaro RN Registered Nurse    Merle Thomas RN Registered Nurse          Procedures:    Procedure(s):  Laparoscopic hiatal hernia repair with gastropexy          Results Review:     I reviewed the patient's new clinical results.      Lab Results (last 24 hours)     Procedure Component Value Units Date/Time    Magnesium [[id number]]  (Abnormal) Collected: 01/08/21 0410    Specimen: Blood Updated: 01/08/21 0457     Magnesium 1.2 mg/dL     Basic Metabolic Panel [[id number]]  (Abnormal) Collected: 01/08/21 0410    Specimen: Blood Updated: 01/08/21 0439     Glucose 114 mg/dL      BUN 16 mg/dL      Creatinine 1.06 mg/dL      Sodium 139 mmol/L      Potassium 3.4 mmol/L      Comment: Slight hemolysis detected by analyzer. Results may be affected.        Chloride 99 mmol/L      CO2 27.0 mmol/L      Calcium 8.3 mg/dL      eGFR Non African Amer 50 mL/min/1.73      BUN/Creatinine Ratio 15.1     Anion Gap 13.0 mmol/L     Narrative:      GFR Normal >60  Chronic Kidney Disease <60  Kidney Failure <15          No results found for: HGBA1C        Results from last 7 days   Lab Units 01/02/21  1711   PH, ARTERIAL pH units 7.353   PO2 ART mm Hg 72.0*   PCO2, ARTERIAL mm Hg 48.2*   HCO3 ART mmol/L 26.8     No results found for: LIPASE  Lab Results   Component Value Date    CHOL 374 (H) 01/03/2020    TRIG 311 (H) 01/03/2020    HDL 50 01/03/2020     (H) 01/03/2020       Lab Results   Lab Value Date/Time    FINALDX  12/31/2020 0205     Leukocytosis with absolute atypical lymphocytosis  Anemia  No blasts identified  If CBC indices persist/worsen, consider flow cytometry or other studies as clinically indicated to exclude a lymphoproliferative  disorder      FINALDX  02/06/2020 1405     Absolute atypical lymphocytosis.  Anemia.  No blasts identified.  Recommend clinical follow-up and additional studies to include flow cytometry as clinically indicated to exclude CLL/SLL or other lymphoproliferative disorder.         Microbiology Results (last 10 days)     ** No results found for the last 240 hours. 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 septal MI  Low voltage, precordial leads  When compared with ECG of 18-Dec-2020 14:29:46,  Poor R wave progression V1 to V3 noted  Electronically Signed By: Fausto Jean (PAVAN) 02-Jan-2021 15:05:56  Date and Time of Study: 2020-12-30 13:14:52    Adult Transthoracic Echo Complete W/ Cont if Necessary Per Protocol [[id number]] Collected: 01/06/21 1415     Updated: 01/06/21 1826     BSA 1.8 m^2      RVIDd 2.5 cm      IVSd 0.89 cm      LVIDd 5.1 cm      LVIDs 2.9 cm      LVPWd 0.93 cm      IVS/LVPW 0.96     FS 42.3 %      EDV(Teich) 122.8 ml      ESV(Teich) 33.1 ml      EF(Teich) 73.1 %      EDV(cubed) 131.2 ml      ESV(cubed) 25.2 ml      EF(cubed) 80.8 %      LV mass(C)d 164.9 grams      LV mass(C)dI 90.0 grams/m^2      SV(Teich) 89.7 ml      SI(Teich) 49.0 ml/m^2      SV(cubed) 106.1 ml      SI(cubed) 57.9 ml/m^2      Ao root diam 3.2 cm      Ao root area 8.0 cm^2      ACS 1.7 cm      asc Aorta Diam 2.3 cm      LVOT diam 2.0 cm      LVOT area 3.0 cm^2      RVOT diam 2.1 cm      RVOT area 3.3 cm^2      EDV(MOD-sp4) 71.6 ml      ESV(MOD-sp4) 18.7 ml      EF(MOD-sp4) 73.8 %      SV(MOD-sp4) 52.9 ml       SI(MOD-sp4) 28.9 ml/m^2      Ao root area (BSA corrected) 1.7     LV Leonard Vol (BSA corrected) 39.1 ml/m^2      LV Sys Vol (BSA corrected) 10.2 ml/m^2      Aortic R-R 0.72 sec      Aortic HR 83.4 BPM      MV E max mindy 148.1 cm/sec      MV A max mindy 117.7 cm/sec      MV E/A 1.3     MV V2 max 125.6 cm/sec      MV max PG 6.3 mmHg      MV V2 mean 87.9 cm/sec      MV mean PG 3.4 mmHg      MV V2 VTI 32.8 cm      MVA(VTI) 2.6 cm^2      MV dec slope 900.2 cm/sec^2      MV dec time 0.16 sec      Ao pk mindy 162.4 cm/sec      Ao max PG 10.6 mmHg      Ao max PG (full) 4.9 mmHg      Ao V2 mean 115.5 cm/sec      Ao mean PG 6.0 mmHg      Ao mean PG (full) 2.7 mmHg      Ao V2 VTI 34.3 cm      EDILMA(I,A) 2.5 cm^2      EDILMA(I,D) 2.5 cm^2      EDILMA(V,A) 2.2 cm^2      EDILMA(V,D) 2.2 cm^2      AI max mindy 441.0 cm/sec      AI max PG 77.8 mmHg      AI dec slope 379.7 cm/sec^2      AI dec time 1.2 sec      AI P1/2t 340.1 msec      LV V1 max PG 5.7 mmHg      LV V1 mean PG 3.2 mmHg      LV V1 max 119.1 cm/sec      LV V1 mean 84.0 cm/sec      LV V1 VTI 27.8 cm      MR max mindy 519.2 cm/sec      MR max .8 mmHg      CO(Ao) 23.0 l/min      CI(Ao) 12.6 l/min/m^2      SV(Ao) 275.9 ml      SI(Ao) 150.6 ml/m^2      CO(LVOT) 7.0 l/min      CI(LVOT) 3.8 l/min/m^2      SV(LVOT) 84.1 ml      SV(RVOT) 62.9 ml      SI(LVOT) 45.9 ml/m^2      PA V2 max 90.0 cm/sec      PA max PG 3.2 mmHg      PA max PG (full) 0.43 mmHg      PA V2 mean 72.3 cm/sec      PA mean PG 2.2 mmHg      PA mean PG (full) 0.66 mmHg      PA V2 VTI 23.7 cm      PVA(I,A) 2.7 cm^2      BH CV ECHO KAREN - PVA(I,D) 2.7 cm^2      BH CV ECHO KAREN - PVA(V,A) 3.1 cm^2      BH CV ECHO KAREN - PVA(V,D) 3.1 cm^2      PA acc time 0.11 sec      PI end-d mindy 49.7 cm/sec      PI max mindy 236.6 cm/sec      PI max PG 22.4 mmHg      RV V1 max PG 2.8 mmHg      RV V1 mean PG 1.5 mmHg      RV V1 max 83.8 cm/sec      RV V1 mean 57.3 cm/sec      RV V1 VTI 19.0 cm      TR max mindy 277.2 cm/sec      RVSP(TR) 33.7 mmHg       RAP systole 3.0 mmHg      PA pr(Accel) 27.6 mmHg      Pulm Sys Garry 66.4 cm/sec      Pulm Leonard Garry 43.5 cm/sec      Pulm S/D 1.5     Qp/Qs 0.75     Pulm A Revs Dur 0.09 sec      Pulm A Revs Garry 29.6 cm/sec       CV ECHO KAREN - BZI_BMI 33.1 kilograms/m^2       CV ECHO KAREN - BSA(HAYCOCK) 1.9 m^2       CV ECHO KAREN - BZI_METRIC_WEIGHT 82.1 kg       CV ECHO KAREN - BZI_METRIC_HEIGHT 157.5 cm      EF(MOD-bp) 74.0 %      LA dimension(2D) 3.9 cm      Echo EF Estimated 65 %     Narrative:      · Estimated left ventricular EF = 65% Estimated left ventricular EF was in   agreement with the calculated left ventricular EF. Left ventricular   systolic function is normal.  · There is mainly affecting the non-coronary and left coronary cusp(s).  · The right ventricular cavity is mildly dilated.  · Estimated right ventricular systolic pressure from tricuspid   regurgitation is normal (<35 mmHg).                  Results for orders placed during the hospital encounter of 12/30/20   Adult Transthoracic Echo Complete W/ Cont if Necessary Per Protocol    Narrative · Estimated left ventricular EF = 65% Estimated left ventricular EF was in   agreement with the calculated left ventricular EF. Left ventricular   systolic function is normal.  · There is mainly affecting the non-coronary and left coronary cusp(s).  · The right ventricular cavity is mildly dilated.  · Estimated right ventricular systolic pressure from tricuspid   regurgitation is normal (<35 mmHg).          Xr Chest 1 View    Result Date: 1/5/2021  Increased pulmonary edema from previous study. Persistent bibasilar airspace opacities atelectasis versus pneumonia. Small bilateral pleural effusions.  Electronically Signed By-Muriel Spencer MD On:1/5/2021 10:10 PM This report was finalized on [id number] by  Muriel Spencer MD.    Xr Chest 1 View    Result Date: 1/2/2021    1.  Low volume inspiration with persistent left lower lobe airspace consolidation which is  unchanged. 2.  Small bilateral pleural effusions.   Electronically Signed By-Jan Vernon MD On:1/2/2021 11:39 AM This report was finalized on [id number] by  Jan Vernon MD.          Xrays, labs reviewed personally by physician.    Medication Review:   I have reviewed the patient's current medication list      Scheduled Meds  amLODIPine, 10 mg, Oral, Daily With Lunch  budesonide-formoterol, 2 puff, Inhalation, BID - RT  docusate sodium, 100 mg, Oral, BID  furosemide, 40 mg, Intravenous, BID  guaiFENesin, 600 mg, Oral, Q12H  ipratropium-albuterol, 3 mL, Nebulization, Q4H - RT  levothyroxine, 100 mcg, Oral, QAM AC  methylnaltrexone, 6 mg, Subcutaneous, Every Other Day  metoclopramide, 10 mg, Intravenous, Q6H  pantoprazole, 40 mg, Oral, Q AM  polyethylene glycol, 17 g, Oral, Daily  rosuvastatin, 10 mg, Oral, Nightly  sodium chloride, 10 mL, Intravenous, Q12H        Meds Infusions  lactated ringers, 9 mL/hr, Last Rate: Stopped (12/30/20 2009)  phenylephrine, 0.5-3 mcg/kg/min        Meds PRN  •  acetaminophen **OR** acetaminophen  •  influenza vaccine  •  lactated ringers  •  magnesium sulfate **OR** magnesium sulfate **OR** magnesium sulfate  •  Morphine  •  [DISCONTINUED] HYDROmorphone **AND** naloxone  •  [DISCONTINUED] Morphine **AND** naloxone  •  ondansetron **OR** ondansetron  •  oxyCODONE  •  phenylephrine  •  potassium chloride **OR** potassium chloride **OR** potassium chloride  •  promethazine **OR** promethazine  •  risperiDONE  •  saline  •  sodium chloride        Assessment/Plan   Assessment/Plan     Active Hospital Problems    Diagnosis  POA   • **Hiatal hernia with gastroesophageal reflux [K21.9, K44.9]  Unknown   • Acute respiratory failure with hypoxia and hypercapnia (CMS/HCC) [J96.01, J96.02]  Unknown   • Abdominal pain [R10.9]  Unknown   • Nocturnal hypoxia [G47.34]  Yes   • LIBBY (obstructive sleep apnea) [G47.33]  Unknown      Resolved Hospital Problems   No resolved problems to display.        MEDICAL DECISION MAKING COMPLEXITY BY PROBLEM:     Acute on chronic hypoxic respiratory failure, LIBBY  LLL PNA  -likely realted to narcotics and LIBBY vs LLL CAP  -CXR 12/31/20: new LLL infiltrate  -CT chest reviewed b/l LL atelectasis vs infiltrate  -Significantly improved, off persistent flow, wean down to nasal cannula as tolerated  -compliant with AVAP  -Nebulizers, symbicort  -Finished Zosyn  -encourage aggressive pulmonary toilet  -Repeat chest x-ray on 1/6 shows bilateral congestion and atelectasis  -Continue on Lasix IV twice daily, monitor I's and O's  -Echo shows EF of 65% and no diastolic dysfunction  -pulmonary following  -Possibly okay to discharge tomorrow if weaned down to nasal cannula    Hiatal hernia with GERD   Possible postoperative ileus, resolved  -S/p laparoscopic hiatal hernia repair with gastropexy  -General surgery primary postop per them  -PPI, carafate, reglan prn  -Diet and bowel regimen per surgery      Hypertension  -Continue home meds  -Monitor and adjust blood pressure prn    CLL  -WBC chronically elevated,(baseline 15-20 range)  -Follow CBC while here    DVT prophylaxis  -Per primary      VTE Prophylaxis -   Mechanical Order History:      Ordered        12/30/20 1513  Place Sequential Compression Device  Once         12/30/20 1513  Place Sequential Compression Device  Once         12/30/20 1513  Maintain Sequential Compression Device  Continuous                 Pharmalogical Order History:     None            Code Status -   Code Status and Medical Interventions:   Ordered at: 12/30/20 1513     Code Status:    CPR     Medical Interventions (Level of Support Prior to Arrest):    Full       This patient has been examined wearing appropriate Personal Protective Equipment. 01/08/21        Discharge Planning  Possible discharge tomorrow if weaned down to nasal cannula        Electronically signed by Meg Lund DO, 01/08/21, 11:22 EST.  [facility]ist Team

## 2021-01-08 NOTE — PROGRESS NOTES
"PULMONARY CRITICAL CARE Progress  NOTE      PATIENT IDENTIFICATION:  Name: Diane Guan  MRN: [id number]  :  1940     Age: 80 y.o.  Sex: female    DATE OF Note:  2021   Referring Physician: Den Plummer DO                  Subjective:   Feeling better   No chest pain, no nausea or vomiting, no change in bowel habit, no dysuria,  no new  skin rash or itching.      Objective:  tMax 24 hrs: Temp (24hrs), Av.9 °F (36.6 °C), Min:97.4 °F (36.3 °C), Max:98.1 °F (36.7 °C)      Vitals Ranges:   Temp:  [97.4 °F (36.3 °C)-98.1 °F (36.7 °C)] 97.9 °F (36.6 °C)  Heart Rate:  [77-97] 83  Resp:  [18-20] 20  BP: (101-150)/(53-70) 144/70    Intake and Output Last 3 Shifts:   I/O last 3 completed shifts:  In: 560 [P.O.:560]  Out: 2510 [Urine:2510]    Exam:  /70   Pulse 83   Temp 97.9 °F (36.6 °C) (Oral)   Resp 20   Ht 157.5 cm (62\")   Wt 83.9 kg (184 lb 15.5 oz)   SpO2 95%   BMI 33.83 kg/m²     General Appearance:   Alert awake  HEENT:  Normocephalic, without obvious abnormality, Conjunctiva/corneas clear,.  Normal external ear canals, Nares normal, no drainage     Neck:  Supple, symmetrical, trachea midline. No JVD.  Lungs /Chest wall:   Bilateral basal rhonchi, respirations unlabored symmetrical wall movement.     Heart:  Regular rate and rhythm, systolic murmur PMI left sternal border  Abdomen: Soft, non-tender, no masses, no organomegaly.    Extremities: Trace edema no clubbing or Cyanosis        Medications:    Current Facility-Administered Medications:   •  acetaminophen (TYLENOL) tablet 650 mg, 650 mg, Oral, Q4H PRN, 650 mg at 21 1338 **OR** acetaminophen (TYLENOL) suppository 650 mg, 650 mg, Rectal, Q4H PRN, Den Plummer DO  •  amLODIPine (NORVASC) tablet 10 mg, 10 mg, Oral, Daily With Lunch, Bob Lewis DO, 10 mg at 21 1330  •  budesonide-formoterol (SYMBICORT) 160-4.5 MCG/ACT inhaler 2 puff, 2 puff, Inhalation, BID - RT, Bob Lewis DO, 2 puff at 21 1913  •  " docusate sodium (COLACE) capsule 100 mg, 100 mg, Oral, BID, Bob Lewis DO, 100 mg at 01/07/21 1937  •  furosemide (LASIX) injection 40 mg, 40 mg, Intravenous, BID, Meg Lund, , 40 mg at 01/07/21 1751  •  guaiFENesin (MUCINEX) 12 hr tablet 600 mg, 600 mg, Oral, Q12H, Bob Lewis DO, 600 mg at 01/07/21 1937  •  influenza vac split quad (FLUZONE,FLUARIX,AFLURIA,FLULAVAL) injection 0.5 mL, 0.5 mL, Intramuscular, During Hospitalization, Bob Lewis DO  •  ipratropium-albuterol (DUO-NEB) nebulizer solution 3 mL, 3 mL, Nebulization, Q4H - RT, Bob Lewis DO, 3 mL at 01/07/21 1913  •  lactated ringers infusion, 9 mL/hr, Intravenous, Continuous PRN, Den Plummer DO, Stopped at 12/30/20 2009  •  levothyroxine (SYNTHROID, LEVOTHROID) tablet 100 mcg, 100 mcg, Oral, QAM AC, Bob Lewis DO, 100 mcg at 01/07/21 0809  •  Magnesium Sulfate 2 gram Bolus, followed by 8 gram infusion (total Mg dose 10 grams)- Mg less than or equal to 1mg/dL, 2 g, Intravenous, PRN **OR** Magnesium Sulfate 2 gram / 50mL Infusion (GIVE X 3 BAGS TO EQUAL 6GM TOTAL DOSE) - Mg 1.1 - 1.5 mg/dl, 2 g, Intravenous, PRN, Last Rate: 25 mL/hr at 01/06/21 1359, 2 g at 01/06/21 1359 **OR** Magnesium Sulfate 4 gram infusion- Mg 1.6-1.9 mg/dL, 4 g, Intravenous, PRN, Meg Lund DO  •  methylnaltrexone (RELISTOR) injection 6 mg, 6 mg, Subcutaneous, Every Other Day, Shanna Goins MD, 6 mg at 01/06/21 0754  •  metoclopramide (REGLAN) injection 10 mg, 10 mg, Intravenous, Q6H, Shanna Goins MD, 10 mg at 01/07/21 1751  •  Morphine sulfate (PF) injection 2 mg, 2 mg, Intravenous, Q4H PRN, Bob Lewis DO, 2 mg at 01/02/21 1328  •  [DISCONTINUED] HYDROmorphone (DILAUDID) injection 0.5 mg, 0.5 mg, Intravenous, Q2H PRN, 0.5 mg at 12/31/20 3290 **AND** naloxone (NARCAN) injection 0.1 mg, 0.1 mg, Intravenous, Q5 Min PRN, Den Plummer DO  •  [DISCONTINUED] Morphine sulfate (PF) injection 4 mg, 4 mg, Intravenous, Q2H PRN **AND** naloxone (NARCAN)  injection 0.4 mg, 0.4 mg, Intravenous, Q5 Min PRN, Den Plummer, DO  •  ondansetron (ZOFRAN) tablet 4 mg, 4 mg, Oral, Q6H PRN **OR** ondansetron (ZOFRAN) injection 4 mg, 4 mg, Intravenous, Q6H PRN, Den Plummer, DO  •  oxyCODONE (ROXICODONE) immediate release tablet 5 mg, 5 mg, Oral, Q8H PRN, Shanna Goins MD  •  pantoprazole (PROTONIX) EC tablet 40 mg, 40 mg, Oral, Q AM, Den Plummer, DO, 40 mg at 01/07/21 0613  •  phenylephrine (AUDRA-SYNEPHRINE) 50 mg in 250 mL NS infusion, 0.5-3 mcg/kg/min, Intravenous, Continuous PRN, Den Plummer, DO  •  polyethylene glycol (MIRALAX) packet 17 g, 17 g, Oral, Daily, Shanna Goins MD, 17 g at 01/06/21 0754  •  potassium chloride (K-DUR,KLOR-CON) CR tablet 40 mEq, 40 mEq, Oral, PRN, 40 mEq at 01/06/21 1142 **OR** potassium chloride (KLOR-CON) packet 40 mEq, 40 mEq, Oral, PRN **OR** potassium chloride 10 mEq in 100 mL IVPB, 10 mEq, Intravenous, Q1H PRN, Meg Lund, DO  •  promethazine (PHENERGAN) tablet 12.5 mg, 12.5 mg, Oral, Q6H PRN **OR** promethazine (PHENERGAN) suppository 12.5 mg, 12.5 mg, Rectal, Q6H PRN, Den Plummer, DO  •  risperiDONE (risperDAL M-TABS) disintegrating tablet 0.25 mg, 0.25 mg, Oral, BID PRN, Bob Lewis, , 0.25 mg at 01/07/21 1938  •  rosuvastatin (CRESTOR) tablet 10 mg, 10 mg, Oral, Nightly, Bob Lewis, DO, 10 mg at 01/07/21 1937  •  saline (AYR) nasal gel, , Topical, PRN, Bob Lewis, DO  •  sodium chloride 0.9 % flush 10 mL, 10 mL, Intravenous, Q12H, Meg Lund DO, 10 mL at 01/07/21 1938  •  sodium chloride 0.9 % flush 10 mL, 10 mL, Intravenous, PRN, Meg Lund DO, 10 mL at 01/06/21 0521    Data Review:  All labs (24hrs):   Recent Results (from the past 24 hour(s))   Basic Metabolic Panel    Collection Time: 01/07/21  8:15 AM    Specimen: Blood   Result Value Ref Range    Glucose 167 (H) 65 - 99 mg/dL    BUN 12 8 - 23 mg/dL    Creatinine 1.13 (H) 0.57 - 1.00 mg/dL    Sodium 137 136 - 145 mmol/L    Potassium 3.7 3.5 -  5.2 mmol/L    Chloride 97 (L) 98 - 107 mmol/L    CO2 27.0 22.0 - 29.0 mmol/L    Calcium 9.1 8.6 - 10.5 mg/dL    eGFR Non African Amer 46 (L) >60 mL/min/1.73    BUN/Creatinine Ratio 10.6 7.0 - 25.0    Anion Gap 13.0 5.0 - 15.0 mmol/L   Magnesium    Collection Time: 01/07/21  8:15 AM    Specimen: Blood   Result Value Ref Range    Magnesium 1.9 1.6 - 2.4 mg/dL        Imaging:  Adult Transthoracic Echo Complete W/ Cont if Necessary Per Protocol  · Estimated left ventricular EF = 65% Estimated left ventricular EF was in   agreement with the calculated left ventricular EF. Left ventricular   systolic function is normal.  · There is mainly affecting the non-coronary and left coronary cusp(s).  · The right ventricular cavity is mildly dilated.  · Estimated right ventricular systolic pressure from tricuspid   regurgitation is normal (<35 mmHg).          ASSESSMENT:    Acute respiratory failure with hypoxia and hypercapnia (CMS/HCC)   Hiatal hernia with gastroesophageal reflux    LIBBY (obstructive sleep apnea)    Nocturnal hypoxia    Abdominal pain       PLAN:  diuretics  Oxygen support  Continue current antibiotics  I-S flutter valve  Bronchodilator  Inhaled corticosteroids  Electrolytes/ glycemic control  DVT and GI prophylaxis.    Total Critical care time in direct medical management (   ) minutes  Wily Rutherford MD. D, ABSM.     1/7/2021  19:48 EST

## 2021-01-08 NOTE — PLAN OF CARE
Goal Outcome Evaluation:  Plan of Care Reviewed With: patient, daughter  Progress: improving  Outcome Summary: Requiring sup/cga for safe, functional mobility. Cga for sit/stand, vcs for safe hand placement c rwx. use and transfers. Amb. 50' in room using rwx., 15L O2, hi janet line. FF posture, guarded trunk rotation, limited righting recovery, weakness present, falls risk. Requiring assist for lower body dressing. Will progress as able, presents safe for home c caregiver and HH at d/c to address deficits. PPE worn: Mask, gloves, shield.

## 2021-01-08 NOTE — PLAN OF CARE
Goal Outcome Evaluation:  Plan of Care Reviewed With: patient  Progress: improving  Outcome Summary: Pt off PF weaning down HF NC now

## 2021-01-08 NOTE — THERAPY TREATMENT NOTE
Patient Name: Diane Guan  : 1940    MRN: [id number]                              Today's Date: 2021       Admit Date: 2020    Visit Dx:     ICD-10-CM ICD-9-CM   1. Hiatal hernia with gastroesophageal reflux  K21.9 530.81    K44.9 553.3     Patient Active Problem List   Diagnosis   • Abnormality of gait   • Mixed anxiety and depressive disorder   • Primary osteoarthritis involving multiple joints   • Breast cancer (CMS/formerly [facility])   • Chronic lymphoid leukemia (CMS/formerly [facility])   • Depression   • Gallbladder disorder   • Hiatal hernia with gastroesophageal reflux   • Mixed hyperlipidemia   • Essential hypertension   • Acquired hypothyroidism   • Type 2 diabetes mellitus without complication, without long-term current use of insulin (CMS/formerly [facility])   • Insomnia   • Postmenopausal status   • Shoulder pain   • Overactive bladder   • Vitamin B 12 deficiency   • Seasonal allergies   • Absolute anemia   • Vitamin D deficiency   • LIBBY (obstructive sleep apnea)   • Nocturnal hypoxia   • Acute respiratory failure with hypoxia and hypercapnia (CMS/formerly [facility])   • Abdominal pain     Past Medical History:   Diagnosis Date   • Acute bronchitis due to human metapneumovirus 2020   • Anxiety disorder    • Arthritis    • Breast cancer (CMS/formerly [facility]) 2019    Invasive ductal carcinoma   • Chronic lymphocytic leukemia (CLL), B-cell (CMS/formerly [facility]) 2019   • Depression    • Disease of thyroid gland    • Diverticulosis of colon 2018    Identified on colonoscopy   • Dysphagia 2020   • Dyspnea on exertion    • Hemorrhoid    • Hiatal hernia 2020   • Hiatal hernia with GERD     large hiatal hernia with high-grade reflux on barium swallow; s/p laparoscopic fundoplication with gastropexy   • History of diabetes mellitus     no meds now   • Hyperlipidemia    • Hypertension    • Mycobacterium avium complex (CMS/formerly [facility]) 2019    aspiration pneumonia; completed abx therapy   • OAB (overactive bladder)    • Sleep apnea     daughter states pt does not  have and doesn't have machine     Past Surgical History:   Procedure Laterality Date   • BLADDER SURGERY     • BRONCHOSCOPY N/A 9/13/2019    Procedure: BRONCHOSCOPY with bronchial washing;  Surgeon: Marnie Frankel MD;  Location: [facility] ENDOSCOPY;  Service: Pulmonary   • COLONOSCOPY  07/19/2018    severe diverticulosis   • CYST REMOVAL      Removed a fatty cyst off of her back   • ENDOSCOPY N/A 11/23/2020    Procedure: ESOPHAGOGASTRODUODENOSCOPY with dilatation (Bougie # 48, 50, 52, 54, 56, 58);  Surgeon: Den Plummer DO;  Location: [facility] ENDOSCOPY;  Service: General;  Laterality: N/A;  Post: large hiatal hernia, joshua's ulcers   • HIATAL HERNIA REPAIR N/A 12/30/2020    Procedure: Laparoscopic hiatal hernia repair with gastropexy;  Surgeon: Den Plummer DO;  Location: [facility] MAIN OR;  Service: General;  Laterality: N/A;   • MASTECTOMY Right 02/04/2019    Invasive ductal carcinoma   • TUBAL ABDOMINAL LIGATION       General Information     Row Name 01/08/21 1533          Physical Therapy Time and Intention    Document Type  therapy note (daily note)  -     Mode of Treatment  physical therapy  -     Row Name 01/08/21 1533          Cognition    Orientation Status (Cognition)  oriented x 3  -     Row Name 01/08/21 1533          Safety Issues, Functional Mobility    Impairments Affecting Function (Mobility)  shortness of breath;endurance/activity tolerance  -       User Key  (r) = Recorded By, (t) = Taken By, (c) = Cosigned By    Initials Name Provider Type     Jess Marion PTA Physical Therapy Assistant        Mobility     Row Name 01/08/21 1533          Bed Mobility    Bed Mobility  bed mobility (all) activities  -     All Activities, [place] (Bed Mobility)  verbal cues;supervision  -     Row Name 01/08/21 1533          Sit-Stand Transfer    Sit-Stand [place] (Transfers)  verbal cues;contact guard;1 person assist  -     Row Name 01/08/21 1533          Gait/Stairs (Locomotion)     [place] Level (Gait)  verbal cues;1 person assist;contact guard  -     Assistive Device (Gait)  walker, front-wheeled  -     Distance in Feet (Gait)  50'  -     Deviations/Abnormal Patterns (Gait)  jey decreased;stride length decreased;weight shifting decreased  -     Bilateral Gait Deviations  forward flexed posture  -     Comment (Gait/Stairs)  Weakness present, low activity tolerance, decreased righting recovery, no lob.  -       User Key  (r) = Recorded By, (t) = Taken By, (c) = Cosigned By    Initials Name Provider Type     Jess Marion PTA Physical Therapy Assistant        Obj/Interventions     [facility] Name 01/08/21 1535          Motor Skills    Therapeutic Exercise  -- Seated BLE exercise. Encouraged pt. to exercise BLEs independent of staff.  -[facility] Name 01/08/21 1535          Balance    Static Sitting Balance  WFL  -     Dynamic Sitting Balance  mild impairment  -     Static Standing Balance  WFL  -     Dynamic Standing Balance  mild impairment  -     Comment, Balance  Unable to awilda B socks, limited reach away from midline in standing.  -       User Key  (r) = Recorded By, (t) = Taken By, (c) = Cosigned By    Initials Name Provider Type     Jess Marion PTA Physical Therapy Assistant        Goals/Plan    No documentation.       Clinical Impression     [facility] Name 01/08/21 1538          Pain    Additional Documentation  Pain Scale: Numbers Pre/Post-Treatment (Group)  -LH     Row Name 01/08/21 1538          Pain Scale: Numbers Pre/Post-Treatment    Pretreatment Pain Rating  0/10 - no pain  -     Posttreatment Pain Rating  0/10 - no pain  -LH     Row Name 01/08/21 1538          Plan of Care Review    Plan of Care Reviewed With  patient;daughter  -     Progress  improving  -     Outcome Summary  Requiring sup/cga for safe, functional mobility. Cga for sit/stand, vcs for safe hand placement c rwx. use and transfers. Amb. 50' in room using rwx., 15L O2, hi janet line. FF  posture, guarded trunk rotation, limited righting recovery, weakness present, falls risk. Requiring assist for lower body dressing. Will progress as able, presents safe for home c caregiver and HH at d/c to address deficits. PPE worn: Mask, gloves, shield.  -[facility] Name 01/08/21 1538          Vital Signs    Pre SpO2 (%)  97  -     O2 Delivery Pre Treatment  supplemental O2  -     O2 Delivery Intra Treatment  supplemental O2  -LH     Post SpO2 (%)  100  -     O2 Delivery Post Treatment  supplemental O2  -     Pre Patient Position  Supine  -     Intra Patient Position  Standing  -     Post Patient Position  Sitting  -[facility] Name 01/08/21 1538          Positioning and Restraints    Pre-Treatment Position  in bed  -     Post Treatment Position  chair  -     In Chair  notified nsg;sitting;call light within reach;with family/caregiver  -       User Key  (r) = Recorded By, (t) = Taken By, (c) = Cosigned By    Initials Name Provider Type     Jess Marion PTA Physical Therapy Assistant        Outcome Measures     Row Name 01/08/21 1543          How much help from another person do you currently need...    Turning from your back to your side while in flat bed without using bedrails?  4  -LH     Moving from lying on back to sitting on the side of a flat bed without bedrails?  4  -LH     Moving to and from a bed to a chair (including a wheelchair)?  3  -LH     Standing up from a chair using your arms (e.g., wheelchair, bedside chair)?  4  -LH     Climbing 3-5 steps with a railing?  3  -LH     To walk in hospital room?  3  -     AM-PAC 6 Clicks Score (PT)  21  -     Row Name 01/08/21 1543          Functional Assessment    Outcome Measure Options  AM-PAC 6 Clicks Basic Mobility (PT)  -       User Key  (r) = Recorded By, (t) = Taken By, (c) = Cosigned By    Initials Name Provider Type     Jess Marion PTA Physical Therapy Assistant        Physical Therapy Education                 Title: PT OT  SLP Therapies (Done)     Topic: Physical Therapy (Done)     Point: Mobility training (Done)     Learning Progress Summary           Patient Acceptance, E, DU,NR by  at 1/8/2021 1543    Comment: Reinforced safe hand placement c rwx. use and transfers.    Acceptance, E, VU by  at 1/8/2021 0312    Acceptance, D,E, DU,NR by  at 1/7/2021 1604    Comment: Reinforced safe hand placement c rwx. use and transfers.    Acceptance, E,TB, VU by  at 1/5/2021 1325    Acceptance, E,TB, VU by SC at 1/4/2021 1905    Acceptance, E,TB, VU by  at 1/3/2021 1712    Acceptance, E,TB, VU by MI at 12/31/2020 1121   Family Acceptance, E, VU by  at 1/8/2021 0312                   Point: Precautions (Done)     Learning Progress Summary           Patient Acceptance, E, DU,NR by  at 1/8/2021 1543    Comment: Reinforced safe hand placement c rwx. use and transfers.    Acceptance, E, VU by  at 1/8/2021 0312    Acceptance, D,E, DU,NR by  at 1/7/2021 1604    Comment: Reinforced safe hand placement c rwx. use and transfers.    Acceptance, E,TB, VU by  at 1/5/2021 1325    Acceptance, E,TB, VU by SC at 1/4/2021 1905    Acceptance, E,TB, VU by MI at 12/31/2020 1121   Family Acceptance, E, VU by  at 1/8/2021 0312                               User Key     Initials Effective Dates Name Provider Type Discipline     03/01/19 -  Kinga Delatorre, PT Physical Therapist PT    SC 03/01/19 -  Mya Lawton PTA Physical Therapy Assistant PT     03/01/19 -  Jess Marion PTA Physical Therapy Assistant PT     06/23/20 -  Chencho Rojas, PT Physical Therapist PT     06/19/20 -  Anjum Ortez, RN Registered Nurse Nurse    MI 03/01/19 -  Ubaldo Elder, RN Registered Nurse Nurse              PT Recommendation and Plan     Plan of Care Reviewed With: patient, daughter  Progress: improving  Outcome Summary: Requiring sup/cga for safe, functional mobility. Cga for sit/stand, vcs for safe hand placement c rwx. use and transfers. Amb. 50' in  room using rwx., 15L O2, hi janet line. FF posture, guarded trunk rotation, limited righting recovery, weakness present, falls risk. Requiring assist for lower body dressing. Will progress as able, presents safe for home c caregiver and HH at d/c to address deficits. PPE worn: Mask, gloves, shield.     Time Calculation:   PT Charges     Row Name 01/08/21 1547             Time Calculation    Start Time  0942  -      Stop Time  1005  -      Time Calculation (min)  23 min  -      PT Received On  01/08/21  -      PT - Next Appointment  01/10/21  -         Time Calculation- PT    Total Timed Code Minutes- PT  23 minute(s)  -         Timed Charges    83320 - PT Therapeutic Exercise Minutes  13  -      82266 - Gait Training Minutes   10  -        User Key  (r) = Recorded By, (t) = Taken By, (c) = Cosigned By    Initials Name Provider Type     Jess Marion, PTA Physical Therapy Assistant        Therapy Charges for Today     Code Description Service Date Service Provider Modifiers Qty    56736788156 HC GAIT TRAINING EA 15 MIN 1/7/2021 Jess Marion, PTA GP 1    22081320656 HC PT THERAPEUTIC ACT EA 15 MIN 1/7/2021 HerJess scales, PTA GP 1    03058314925 HC PT THER PROC EA 15 MIN 1/8/2021 Jess Marion, PTA GP 1    79945592282 HC GAIT TRAINING EA 15 MIN 1/8/2021 Jess Marion, PTA GP 1          PT G-Codes  Outcome Measure Options: AM-PAC 6 Clicks Basic Mobility (PT)  AM-PAC 6 Clicks Score (PT): 21    Jess Marion PTA  1/8/2021

## 2021-01-08 NOTE — PROGRESS NOTES
Continued Stay Note   [place]     Patient Name: Diane Guan  MRN: [id number]  Today's Date: 1/8/2021    Admit Date: 12/30/2020    Discharge Plan     Row Name 01/08/21 0944       Plan    Plan  D/C Plan : Home with spouse . Pt is refusing H/H . Pt has home o2 .    Plan Comments  Barrier to D/C . Pt is POD 8 of a hernia repair . pt is down to 15l of HF o2 . Paula a full liq. diet .        Discharge Codes    No documentation.       Expected Discharge Date and Time     Expected Discharge Date Expected Discharge Time    Jan 11, 2021             Breana Sterling RN

## 2021-01-08 NOTE — PLAN OF CARE
Problem: Adult Inpatient Plan of Care  Goal: Plan of Care Review  1/8/2021 1546 by Jess Marion PTA  Flowsheets (Taken 1/8/2021 1538)  Progress: improving  Plan of Care Reviewed With:   patient   daughter  Outcome Summary: Requiring sup/cga for safe, functional mobility. Cga for sit/stand, vcs for safe hand placement c rwx. use and transfers. Amb. 50' in room using rwx., 15L O2, hi janet line. FF posture, guarded trunk rotation, limited righting recovery, weakness present, falls risk. Requiring assist for lower body dressing. Will progress as able, presents safe for home c caregiver and HH at d/c to address deficits. PPE worn: Mask, gloves, shield.  1/8/2021 1545 by Jess Marion PTA  Outcome: Ongoing, Progressing  Flowsheets (Taken 1/8/2021 1538)  Progress: improving  Plan of Care Reviewed With:   patient   daughter  Outcome Summary: Requiring sup/cga for safe, functional mobility. Cga for sit/stand, vcs for safe hand placement c rwx. use and transfers. Amb. 50' in room using rwx., 15L O2, hi janet line. FF posture, guarded trunk rotation, limited righting recovery, weakness present, falls risk. Requiring assist for lower body dressing. Will progress as able, presents safe for home c caregiver and HH at d/c to address deficits. PPE worn: Mask, gloves, shield.  1/8/2021 1544 by Jess Marion, PTA  Outcome: Ongoing, Progressing  Flowsheets (Taken 1/8/2021 1538)  Progress: improving  Plan of Care Reviewed With:   patient   daughter  Outcome Summary: Requiring sup/cga for safe, functional mobility. Cga for sit/stand, vcs for safe hand placement c rwx. use and transfers. Amb. 50' in room using rwx., 15L O2, hi janet line. FF posture, guarded trunk rotation, limited righting recovery, weakness present, falls risk. Requiring assist for lower body dressing. Will progress as able, presents safe for home c caregiver and HH at d/c to address deficits. PPE worn: Mask, gloves, shield.   Goal Outcome Evaluation:  Plan of Care  Reviewed With: patient, daughter  Progress: improving  Outcome Summary: Requiring sup/cga for safe, functional mobility. Cga for sit/stand, vcs for safe hand placement c rwx. use and transfers. Amb. 50' in room using rwx., 15L O2, hi janet line. FF posture, guarded trunk rotation, limited righting recovery, weakness present, falls risk. Requiring assist for lower body dressing. Will progress as able, presents safe for home c caregiver and HH at d/c to address deficits. PPE worn: Mask, gloves, shield.

## 2021-01-08 NOTE — PLAN OF CARE
Goal Outcome Evaluation:  Plan of Care Reviewed With: patient, family  Progress: improving  Pt has been resting well in short periods as she will wake up and become very anxious at times. Pt is still on precision flow and has been going back and forth from 15L and 70% to 15L and 80% with sats 90-95%. Will continue to monitor.

## 2021-01-09 LAB
ANION GAP SERPL CALCULATED.3IONS-SCNC: 14 MMOL/L (ref 5–15)
BUN SERPL-MCNC: 25 MG/DL (ref 8–23)
BUN/CREAT SERPL: 15.5 (ref 7–25)
CALCIUM SPEC-SCNC: 9.3 MG/DL (ref 8.6–10.5)
CHLORIDE SERPL-SCNC: 94 MMOL/L (ref 98–107)
CO2 SERPL-SCNC: 28 MMOL/L (ref 22–29)
CREAT SERPL-MCNC: 1.61 MG/DL (ref 0.57–1)
GFR SERPL CREATININE-BSD FRML MDRD: 31 ML/MIN/1.73
GLUCOSE SERPL-MCNC: 130 MG/DL (ref 65–99)
MAGNESIUM SERPL-MCNC: 1.7 MG/DL (ref 1.6–2.4)
POTASSIUM SERPL-SCNC: 4.6 MMOL/L (ref 3.5–5.2)
SODIUM SERPL-SCNC: 136 MMOL/L (ref 136–145)

## 2021-01-09 PROCEDURE — 99232 SBSQ HOSP IP/OBS MODERATE 35: CPT | Performed by: INTERNAL MEDICINE

## 2021-01-09 PROCEDURE — 63710000001 ONDANSETRON PER 8 MG: Performed by: SURGERY

## 2021-01-09 PROCEDURE — 80048 BASIC METABOLIC PNL TOTAL CA: CPT | Performed by: INTERNAL MEDICINE

## 2021-01-09 PROCEDURE — 25010000002 ONDANSETRON PER 1 MG: Performed by: SURGERY

## 2021-01-09 PROCEDURE — 25010000002 FUROSEMIDE PER 20 MG: Performed by: INTERNAL MEDICINE

## 2021-01-09 PROCEDURE — 25010000002 MAGNESIUM SULFATE 2 GM/50ML SOLUTION: Performed by: INTERNAL MEDICINE

## 2021-01-09 PROCEDURE — 25010000002 METOCLOPRAMIDE PER 10 MG: Performed by: SURGERY

## 2021-01-09 PROCEDURE — 83735 ASSAY OF MAGNESIUM: CPT | Performed by: INTERNAL MEDICINE

## 2021-01-09 PROCEDURE — 94799 UNLISTED PULMONARY SVC/PX: CPT

## 2021-01-09 RX ORDER — MAGNESIUM SULFATE HEPTAHYDRATE 40 MG/ML
2 INJECTION, SOLUTION INTRAVENOUS ONCE
Status: COMPLETED | OUTPATIENT
Start: 2021-01-09 | End: 2021-01-09

## 2021-01-09 RX ORDER — SODIUM CHLORIDE 9 MG/ML
75 INJECTION, SOLUTION INTRAVENOUS ONCE
Status: COMPLETED | OUTPATIENT
Start: 2021-01-09 | End: 2021-01-09

## 2021-01-09 RX ORDER — MIRTAZAPINE 7.5 MG/1
7.5 TABLET, FILM COATED ORAL NIGHTLY
Status: DISCONTINUED | OUTPATIENT
Start: 2021-01-09 | End: 2021-01-13 | Stop reason: HOSPADM

## 2021-01-09 RX ORDER — RISPERIDONE 0.5 MG/1
0.5 TABLET, ORALLY DISINTEGRATING ORAL 2 TIMES DAILY PRN
Status: DISCONTINUED | OUTPATIENT
Start: 2021-01-09 | End: 2021-01-13 | Stop reason: HOSPADM

## 2021-01-09 RX ADMIN — ONDANSETRON HYDROCHLORIDE 4 MG: 4 TABLET, FILM COATED ORAL at 21:14

## 2021-01-09 RX ADMIN — IPRATROPIUM BROMIDE AND ALBUTEROL SULFATE 3 ML: 2.5; .5 SOLUTION RESPIRATORY (INHALATION) at 18:55

## 2021-01-09 RX ADMIN — METOCLOPRAMIDE HYDROCHLORIDE 10 MG: 5 INJECTION INTRAMUSCULAR; INTRAVENOUS at 05:12

## 2021-01-09 RX ADMIN — AMLODIPINE BESYLATE 10 MG: 5 TABLET ORAL at 12:57

## 2021-01-09 RX ADMIN — RISPERIDONE 0.25 MG: 0.5 TABLET, ORALLY DISINTEGRATING ORAL at 09:18

## 2021-01-09 RX ADMIN — METOCLOPRAMIDE HYDROCHLORIDE 10 MG: 5 INJECTION INTRAMUSCULAR; INTRAVENOUS at 23:29

## 2021-01-09 RX ADMIN — DOCUSATE SODIUM 100 MG: 100 CAPSULE, LIQUID FILLED ORAL at 21:15

## 2021-01-09 RX ADMIN — SODIUM CHLORIDE 75 ML/HR: 9 INJECTION, SOLUTION INTRAVENOUS at 17:38

## 2021-01-09 RX ADMIN — IPRATROPIUM BROMIDE AND ALBUTEROL SULFATE 3 ML: 2.5; .5 SOLUTION RESPIRATORY (INHALATION) at 10:57

## 2021-01-09 RX ADMIN — IPRATROPIUM BROMIDE AND ALBUTEROL SULFATE 3 ML: 2.5; .5 SOLUTION RESPIRATORY (INHALATION) at 07:00

## 2021-01-09 RX ADMIN — OXYCODONE 5 MG: 5 TABLET ORAL at 21:13

## 2021-01-09 RX ADMIN — GUAIFENESIN 600 MG: 600 TABLET, EXTENDED RELEASE ORAL at 09:12

## 2021-01-09 RX ADMIN — MAGNESIUM SULFATE HEPTAHYDRATE 2 G: 40 INJECTION, SOLUTION INTRAVENOUS at 17:38

## 2021-01-09 RX ADMIN — FUROSEMIDE 40 MG: 10 INJECTION, SOLUTION INTRAMUSCULAR; INTRAVENOUS at 09:12

## 2021-01-09 RX ADMIN — MIRTAZAPINE 7.5 MG: 7.5 TABLET ORAL at 21:13

## 2021-01-09 RX ADMIN — DOCUSATE SODIUM 100 MG: 100 CAPSULE, LIQUID FILLED ORAL at 09:12

## 2021-01-09 RX ADMIN — Medication 10 ML: at 09:13

## 2021-01-09 RX ADMIN — PANTOPRAZOLE SODIUM 40 MG: 40 TABLET, DELAYED RELEASE ORAL at 05:12

## 2021-01-09 RX ADMIN — METOCLOPRAMIDE HYDROCHLORIDE 10 MG: 5 INJECTION INTRAMUSCULAR; INTRAVENOUS at 17:38

## 2021-01-09 RX ADMIN — IPRATROPIUM BROMIDE AND ALBUTEROL SULFATE 3 ML: 2.5; .5 SOLUTION RESPIRATORY (INHALATION) at 15:07

## 2021-01-09 RX ADMIN — LEVOTHYROXINE SODIUM 100 MCG: 125 TABLET ORAL at 09:19

## 2021-01-09 RX ADMIN — ROSUVASTATIN CALCIUM 10 MG: 10 TABLET, FILM COATED ORAL at 21:14

## 2021-01-09 RX ADMIN — ONDANSETRON 4 MG: 2 INJECTION INTRAMUSCULAR; INTRAVENOUS at 09:26

## 2021-01-09 RX ADMIN — IPRATROPIUM BROMIDE AND ALBUTEROL SULFATE 3 ML: 2.5; .5 SOLUTION RESPIRATORY (INHALATION) at 04:14

## 2021-01-09 RX ADMIN — METOCLOPRAMIDE HYDROCHLORIDE 10 MG: 5 INJECTION INTRAMUSCULAR; INTRAVENOUS at 12:58

## 2021-01-09 RX ADMIN — BUDESONIDE AND FORMOTEROL FUMARATE DIHYDRATE 2 PUFF: 160; 4.5 AEROSOL RESPIRATORY (INHALATION) at 07:00

## 2021-01-09 NOTE — PROGRESS NOTES
"PULMONARY CRITICAL CARE Progress  NOTE      PATIENT IDENTIFICATION:  Name: Diane Guan  MRN: [id number]  :  1940     Age: 80 y.o.  Sex: female    DATE OF Note:  2021   Referring Physician: Den Plummer DO                  Subjective:   Feeling better  On NC now   No chest pain, no nausea or vomiting, no change in bowel habit, no dysuria,  no new  skin rash or itching.      Objective:  tMax 24 hrs: Temp (24hrs), Av.4 °F (36.9 °C), Min:98.3 °F (36.8 °C), Max:98.7 °F (37.1 °C)      Vitals Ranges:   Temp:  [98.3 °F (36.8 °C)-98.7 °F (37.1 °C)] 98.7 °F (37.1 °C)  Heart Rate:  [] 97  Resp:  [16-20] 20  BP: (131-139)/(51-63) 139/51    Intake and Output Last 3 Shifts:   I/O last 3 completed shifts:  In: 840 [P.O.:840]  Out: 1000 [Urine:1000]    Exam:  /51   Pulse 97   Temp 98.7 °F (37.1 °C) (Oral)   Resp 20   Ht 157.5 cm (62\")   Wt 81.9 kg (180 lb 8.9 oz)   SpO2 91%   BMI 33.02 kg/m²     General Appearance:   Alert awake  HEENT:  Normocephalic, without obvious abnormality, Conjunctiva/corneas clear,.  Normal external ear canals, Nares normal, no drainage     Neck:  Supple, symmetrical, trachea midline. No JVD.  Lungs /Chest wall:   Bilateral basal rhonchi, respirations unlabored symmetrical wall movement.     Heart:  Regular rate and rhythm, systolic murmur PMI left sternal border  Abdomen: Soft, non-tender, no masses, no organomegaly.    Extremities: Trace edema no clubbing or Cyanosis        Medications:    Current Facility-Administered Medications:   •  acetaminophen (TYLENOL) tablet 650 mg, 650 mg, Oral, Q4H PRN, 650 mg at 21 1338 **OR** acetaminophen (TYLENOL) suppository 650 mg, 650 mg, Rectal, Q4H PRN, Elian, Den D, DO  •  amLODIPine (NORVASC) tablet 10 mg, 10 mg, Oral, Daily With Lunch, Bob Lewis DO, 10 mg at 21 1257  •  budesonide-formoterol (SYMBICORT) 160-4.5 MCG/ACT inhaler 2 puff, 2 puff, Inhalation, BID - RT, Bob Lewis DO, 2 puff at " 01/09/21 0700  •  docusate sodium (COLACE) capsule 100 mg, 100 mg, Oral, BID, Bob Lewis DO, 100 mg at 01/09/21 0912  •  guaiFENesin (MUCINEX) 12 hr tablet 600 mg, 600 mg, Oral, Q12H, Bob Lewis DO, 600 mg at 01/09/21 0912  •  influenza vac split quad (FLUZONE,FLUARIX,AFLURIA,FLULAVAL) injection 0.5 mL, 0.5 mL, Intramuscular, During Hospitalization, Bob Lewis DO  •  ipratropium-albuterol (DUO-NEB) nebulizer solution 3 mL, 3 mL, Nebulization, Q4H - RT, Bob Lewis DO, 3 mL at 01/09/21 1507  •  lactated ringers infusion, 9 mL/hr, Intravenous, Continuous PRN, Den Plummer DO, Stopped at 12/30/20 2009  •  levothyroxine (SYNTHROID, LEVOTHROID) tablet 100 mcg, 100 mcg, Oral, QAM AC, Bob Lewis DO, 100 mcg at 01/09/21 0919  •  Magnesium Sulfate 2 gram Bolus, followed by 8 gram infusion (total Mg dose 10 grams)- Mg less than or equal to 1mg/dL, 2 g, Intravenous, PRN **OR** Magnesium Sulfate 2 gram / 50mL Infusion (GIVE X 3 BAGS TO EQUAL 6GM TOTAL DOSE) - Mg 1.1 - 1.5 mg/dl, 2 g, Intravenous, PRN, Last Rate: 25 mL/hr at 01/08/21 0824, 2 g at 01/08/21 0824 **OR** Magnesium Sulfate 4 gram infusion- Mg 1.6-1.9 mg/dL, 4 g, Intravenous, PRN, Meg Lund, DO  •  methylnaltrexone (RELISTOR) injection 6 mg, 6 mg, Subcutaneous, Every Other Day, Shanna Goins MD, 6 mg at 01/08/21 0826  •  metoclopramide (REGLAN) injection 10 mg, 10 mg, Intravenous, Q6H, Shanna Goins MD, 10 mg at 01/09/21 1738  •  mirtazapine (REMERON) tablet 7.5 mg, 7.5 mg, Oral, Nightly, Meg Lund, DO  •  Morphine sulfate (PF) injection 2 mg, 2 mg, Intravenous, Q4H PRN, Bob Lewis DO, 2 mg at 01/08/21 0015  •  [DISCONTINUED] HYDROmorphone (DILAUDID) injection 0.5 mg, 0.5 mg, Intravenous, Q2H PRN, 0.5 mg at 12/31/20 9910 **AND** naloxone (NARCAN) injection 0.1 mg, 0.1 mg, Intravenous, Q5 Min PRN, Den Plummer DO  •  [DISCONTINUED] Morphine sulfate (PF) injection 4 mg, 4 mg, Intravenous, Q2H PRN **AND** naloxone (NARCAN) injection  0.4 mg, 0.4 mg, Intravenous, Q5 Min PRN, Den Plummer, DO  •  ondansetron (ZOFRAN) tablet 4 mg, 4 mg, Oral, Q6H PRN **OR** ondansetron (ZOFRAN) injection 4 mg, 4 mg, Intravenous, Q6H PRN, Den Plummer, , 4 mg at 01/09/21 0926  •  oxyCODONE (ROXICODONE) immediate release tablet 5 mg, 5 mg, Oral, Q8H PRN, Shanna Goins MD, 5 mg at 01/08/21 2351  •  pantoprazole (PROTONIX) EC tablet 40 mg, 40 mg, Oral, Q AM, Den Plummer DO, 40 mg at 01/09/21 0512  •  phenylephrine (AUDRA-SYNEPHRINE) 50 mg in 250 mL NS infusion, 0.5-3 mcg/kg/min, Intravenous, Continuous PRN, Den Plummer, DO  •  polyethylene glycol (MIRALAX) packet 17 g, 17 g, Oral, Daily, Shanna Goins MD, 17 g at 01/06/21 0754  •  potassium chloride (K-DUR,KLOR-CON) CR tablet 40 mEq, 40 mEq, Oral, PRN, 40 mEq at 01/06/21 1142 **OR** potassium chloride (KLOR-CON) packet 40 mEq, 40 mEq, Oral, PRN **OR** potassium chloride 10 mEq in 100 mL IVPB, 10 mEq, Intravenous, Q1H PRN, Meg Lund, DO  •  promethazine (PHENERGAN) tablet 12.5 mg, 12.5 mg, Oral, Q6H PRN **OR** promethazine (PHENERGAN) suppository 12.5 mg, 12.5 mg, Rectal, Q6H PRN, Den Plummer DO  •  risperiDONE (risperDAL M-TABS) disintegrating tablet 0.5 mg, 0.5 mg, Oral, BID PRN, Bisi Lundram, DO  •  rosuvastatin (CRESTOR) tablet 10 mg, 10 mg, Oral, Nightly, Bob Lewis DO, 10 mg at 01/08/21 2007  •  saline (AYR) nasal gel, , Topical, PRN, Bob Lewis, DO  •  sodium chloride 0.9 % flush 10 mL, 10 mL, Intravenous, Q12H, Meg Lund, , 10 mL at 01/09/21 0913  •  sodium chloride 0.9 % flush 10 mL, 10 mL, Intravenous, PRN, Meg Lund, DO, 10 mL at 01/06/21 0521    Data Review:  All labs (24hrs):   Recent Results (from the past 24 hour(s))   Basic Metabolic Panel    Collection Time: 01/09/21 10:48 AM    Specimen: Blood   Result Value Ref Range    Glucose 130 (H) 65 - 99 mg/dL    BUN 25 (H) 8 - 23 mg/dL    Creatinine 1.61 (H) 0.57 - 1.00 mg/dL    Sodium 136 136 - 145 mmol/L     Potassium 4.6 3.5 - 5.2 mmol/L    Chloride 94 (L) 98 - 107 mmol/L    CO2 28.0 22.0 - 29.0 mmol/L    Calcium 9.3 8.6 - 10.5 mg/dL    eGFR Non African Amer 31 (L) >60 mL/min/1.73    BUN/Creatinine Ratio 15.5 7.0 - 25.0    Anion Gap 14.0 5.0 - 15.0 mmol/L   Magnesium    Collection Time: 01/09/21 10:48 AM    Specimen: Blood   Result Value Ref Range    Magnesium 1.7 1.6 - 2.4 mg/dL        Imaging:  Adult Transthoracic Echo Complete W/ Cont if Necessary Per Protocol  · Estimated left ventricular EF = 65% Estimated left ventricular EF was in   agreement with the calculated left ventricular EF. Left ventricular   systolic function is normal.  · There is mainly affecting the non-coronary and left coronary cusp(s).  · The right ventricular cavity is mildly dilated.  · Estimated right ventricular systolic pressure from tricuspid   regurgitation is normal (<35 mmHg).          ASSESSMENT:    Acute respiratory failure with hypoxia and hypercapnia (CMS/HCC)   Hiatal hernia with gastroesophageal reflux    LIBBY (obstructive sleep apnea)    Nocturnal hypoxia    Abdominal pain       PLAN:  conitnue  diuretics  Wean oxygen down  Continue current antibiotics  I-S flutter valve  Bronchodilator  Inhaled corticosteroids  Electrolytes/ glycemic control  DVT and GI prophylaxis.    Total Critical care time in direct medical management (   ) minutes  Wily Rutherford MD. D, ABSM.     1/9/2021  18:43 EST

## 2021-01-09 NOTE — PLAN OF CARE
Problem: Adult Inpatient Plan of Care  Goal: Plan of Care Review  Outcome: Ongoing, Progressing  Flowsheets  Taken 1/9/2021 1120 by Leonor Dunn, RN  Outcome Summary: Post op lap hernia repair with gastropexy. Pt on 10L HF. Daughter at bedside. Pt reported some nausea this morning after excessive coughing. Relieved with zofran. No distress noted. Will continue to monitor.  Taken 1/9/2021 0528 by Daja Whitaker RN  Plan of Care Reviewed With: patient   Goal Outcome Evaluation:  Plan of Care Reviewed With: patient  Progress: improving  Outcome Summary: Post op lap hernia repair with gastropexy. Pt on 10L HF. Daughter at bedside. Pt reported some nausea this morning after excessive coughing. Relieved with zofran. No distress noted. Will continue to monitor.

## 2021-01-09 NOTE — PROGRESS NOTES
"PULMONARY CRITICAL CARE Progress  NOTE      PATIENT IDENTIFICATION:  Name: Diane Guan  MRN: [id number]  :  1940     Age: 80 y.o.  Sex: female    DATE OF Note:  2021   Referring Physician: Den Plummer DO                  Subjective:   Feeling better  On NC now   No chest pain, no nausea or vomiting, no change in bowel habit, no dysuria,  no new  skin rash or itching.      Objective:  tMax 24 hrs: Temp (24hrs), Av.3 °F (36.8 °C), Min:98.1 °F (36.7 °C), Max:98.5 °F (36.9 °C)      Vitals Ranges:   Temp:  [98.1 °F (36.7 °C)-98.5 °F (36.9 °C)] 98.3 °F (36.8 °C)  Heart Rate:  [] 96  Resp:  [16-22] 18  BP: (129-146)/(51-77) 131/60    Intake and Output Last 3 Shifts:   I/O last 3 completed shifts:  In: 1400 [P.O.:1400]  Out: 1300 [Urine:1300]    Exam:  /60   Pulse 96   Temp 98.3 °F (36.8 °C)   Resp 18   Ht 157.5 cm (62\")   Wt 85.4 kg (188 lb 4.4 oz)   SpO2 92%   BMI 34.44 kg/m²     General Appearance:   Alert awake  HEENT:  Normocephalic, without obvious abnormality, Conjunctiva/corneas clear,.  Normal external ear canals, Nares normal, no drainage     Neck:  Supple, symmetrical, trachea midline. No JVD.  Lungs /Chest wall:   Bilateral basal rhonchi, respirations unlabored symmetrical wall movement.     Heart:  Regular rate and rhythm, systolic murmur PMI left sternal border  Abdomen: Soft, non-tender, no masses, no organomegaly.    Extremities: Trace edema no clubbing or Cyanosis        Medications:    Current Facility-Administered Medications:   •  acetaminophen (TYLENOL) tablet 650 mg, 650 mg, Oral, Q4H PRN, 650 mg at 21 1338 **OR** acetaminophen (TYLENOL) suppository 650 mg, 650 mg, Rectal, Q4H PRN, Den Plummer,   •  amLODIPine (NORVASC) tablet 10 mg, 10 mg, Oral, Daily With Lunch, Bob Lewis DO, 10 mg at 21 1352  •  budesonide-formoterol (SYMBICORT) 160-4.5 MCG/ACT inhaler 2 puff, 2 puff, Inhalation, BID - RT, Bob Lewis DO, 2 puff at 21 " 1931  •  docusate sodium (COLACE) capsule 100 mg, 100 mg, Oral, BID, Bob Lewis DO, 100 mg at 01/08/21 2006  •  furosemide (LASIX) injection 40 mg, 40 mg, Intravenous, BID, Meg Lund, , 40 mg at 01/08/21 1821  •  guaiFENesin (MUCINEX) 12 hr tablet 600 mg, 600 mg, Oral, Q12H, Bob Lewis DO, 600 mg at 01/08/21 2006  •  influenza vac split quad (FLUZONE,FLUARIX,AFLURIA,FLULAVAL) injection 0.5 mL, 0.5 mL, Intramuscular, During Hospitalization, Bob Lewis DO  •  ipratropium-albuterol (DUO-NEB) nebulizer solution 3 mL, 3 mL, Nebulization, Q4H - RT, Bob Lewis DO, 3 mL at 01/08/21 1931  •  lactated ringers infusion, 9 mL/hr, Intravenous, Continuous PRN, Den Plummer DO, Stopped at 12/30/20 2009  •  levothyroxine (SYNTHROID, LEVOTHROID) tablet 100 mcg, 100 mcg, Oral, QAM AC, Bob Lewis DO, 100 mcg at 01/08/21 0633  •  Magnesium Sulfate 2 gram Bolus, followed by 8 gram infusion (total Mg dose 10 grams)- Mg less than or equal to 1mg/dL, 2 g, Intravenous, PRN **OR** Magnesium Sulfate 2 gram / 50mL Infusion (GIVE X 3 BAGS TO EQUAL 6GM TOTAL DOSE) - Mg 1.1 - 1.5 mg/dl, 2 g, Intravenous, PRN, Last Rate: 25 mL/hr at 01/08/21 0824, 2 g at 01/08/21 0824 **OR** Magnesium Sulfate 4 gram infusion- Mg 1.6-1.9 mg/dL, 4 g, Intravenous, PRN, eMg Lund DO  •  methylnaltrexone (RELISTOR) injection 6 mg, 6 mg, Subcutaneous, Every Other Day, Shanna Goins MD, 6 mg at 01/08/21 0826  •  metoclopramide (REGLAN) injection 10 mg, 10 mg, Intravenous, Q6H, Shanna Goins MD, 10 mg at 01/08/21 1820  •  Morphine sulfate (PF) injection 2 mg, 2 mg, Intravenous, Q4H PRN, Bob Lewis DO, 2 mg at 01/08/21 0015  •  [DISCONTINUED] HYDROmorphone (DILAUDID) injection 0.5 mg, 0.5 mg, Intravenous, Q2H PRN, 0.5 mg at 12/31/20 2379 **AND** naloxone (NARCAN) injection 0.1 mg, 0.1 mg, Intravenous, Q5 Min PRN, Den Plummer DO  •  [DISCONTINUED] Morphine sulfate (PF) injection 4 mg, 4 mg, Intravenous, Q2H PRN **AND** naloxone  (NARCAN) injection 0.4 mg, 0.4 mg, Intravenous, Q5 Min PRN, Den Plummer, DO  •  ondansetron (ZOFRAN) tablet 4 mg, 4 mg, Oral, Q6H PRN **OR** ondansetron (ZOFRAN) injection 4 mg, 4 mg, Intravenous, Q6H PRN, Den Plummer, DO  •  oxyCODONE (ROXICODONE) immediate release tablet 5 mg, 5 mg, Oral, Q8H PRN, Shanna Goins MD  •  pantoprazole (PROTONIX) EC tablet 40 mg, 40 mg, Oral, Q AM, Den Plummer, DO, 40 mg at 01/08/21 0633  •  phenylephrine (AUDRA-SYNEPHRINE) 50 mg in 250 mL NS infusion, 0.5-3 mcg/kg/min, Intravenous, Continuous PRN, Den Plummer, DO  •  polyethylene glycol (MIRALAX) packet 17 g, 17 g, Oral, Daily, Shanna Goins MD, 17 g at 01/06/21 0754  •  potassium chloride (K-DUR,KLOR-CON) CR tablet 40 mEq, 40 mEq, Oral, PRN, 40 mEq at 01/06/21 1142 **OR** potassium chloride (KLOR-CON) packet 40 mEq, 40 mEq, Oral, PRN **OR** potassium chloride 10 mEq in 100 mL IVPB, 10 mEq, Intravenous, Q1H PRN, Meg Lund, DO  •  promethazine (PHENERGAN) tablet 12.5 mg, 12.5 mg, Oral, Q6H PRN **OR** promethazine (PHENERGAN) suppository 12.5 mg, 12.5 mg, Rectal, Q6H PRN, Den Plummer, DO  •  risperiDONE (risperDAL M-TABS) disintegrating tablet 0.25 mg, 0.25 mg, Oral, BID PRN, Bob Lewis DO, 0.25 mg at 01/08/21 2011  •  rosuvastatin (CRESTOR) tablet 10 mg, 10 mg, Oral, Nightly, Bob Lewis, , 10 mg at 01/08/21 2007  •  saline (AYR) nasal gel, , Topical, PRN, Bob Lewis, DO  •  sodium chloride 0.9 % flush 10 mL, 10 mL, Intravenous, Q12H, Meg Lund DO, 10 mL at 01/08/21 2007  •  sodium chloride 0.9 % flush 10 mL, 10 mL, Intravenous, PRN, Meg Lund DO, 10 mL at 01/06/21 0521    Data Review:  All labs (24hrs):   Recent Results (from the past 24 hour(s))   Basic Metabolic Panel    Collection Time: 01/08/21  4:10 AM    Specimen: Blood   Result Value Ref Range    Glucose 114 (H) 65 - 99 mg/dL    BUN 16 8 - 23 mg/dL    Creatinine 1.06 (H) 0.57 - 1.00 mg/dL    Sodium 139 136 - 145 mmol/L    Potassium  3.4 (L) 3.5 - 5.2 mmol/L    Chloride 99 98 - 107 mmol/L    CO2 27.0 22.0 - 29.0 mmol/L    Calcium 8.3 (L) 8.6 - 10.5 mg/dL    eGFR Non African Amer 50 (L) >60 mL/min/1.73    BUN/Creatinine Ratio 15.1 7.0 - 25.0    Anion Gap 13.0 5.0 - 15.0 mmol/L   Magnesium    Collection Time: 01/08/21  4:10 AM    Specimen: Blood   Result Value Ref Range    Magnesium 1.2 (L) 1.6 - 2.4 mg/dL        Imaging:  Adult Transthoracic Echo Complete W/ Cont if Necessary Per Protocol  · Estimated left ventricular EF = 65% Estimated left ventricular EF was in   agreement with the calculated left ventricular EF. Left ventricular   systolic function is normal.  · There is mainly affecting the non-coronary and left coronary cusp(s).  · The right ventricular cavity is mildly dilated.  · Estimated right ventricular systolic pressure from tricuspid   regurgitation is normal (<35 mmHg).          ASSESSMENT:    Acute respiratory failure with hypoxia and hypercapnia (CMS/HCC)   Hiatal hernia with gastroesophageal reflux    LIBBY (obstructive sleep apnea)    Nocturnal hypoxia    Abdominal pain       PLAN:  diuretics  Wean oxygen down  Continue current antibiotics  I-S flutter valve  Bronchodilator  Inhaled corticosteroids  Electrolytes/ glycemic control  DVT and GI prophylaxis.    Total Critical care time in direct medical management (   ) minutes  Wily Rutherford MD. D, ABSM.     1/8/2021  23:22 EST

## 2021-01-09 NOTE — PROGRESS NOTES
[facility] Medicine Services Daily Progress Note      Hospitalist Team  LOS 9 days      Patient Care Team:  Jemima Fontaine MD as PCP - General (Family Medicine)    Patient Location: 2105/1      Subjective   Subjective     Chief Complaint / Subjective  No chief complaint on file.  follow up shortness of breath       Brief Synopsis of Hospital Course/HPI     80-year-old female who was having issues with GERD was found to have hiatal hernia and is underwent fixation per general surgery.  She has been admitted postoperatively medicine has been consulted to help with her medical problems.  Her blood pressure stable she is having some apnea spells from the sedation but overall her breathing is stable.  No other issues.  As reported she is slightly sedated from the procedure and HPI was slightly limited.         Date::    12/31/20: had worsening respiratory status last night and has been on HFNC this am. Refusing to wear BIPAP although likely needs it fo LIBBY.    1/1/20: on BIPAP this am and thus HPI limited. Events of overnight noted was moved to higher level of care. O2 is stable today. Seems O2 worse when not deep breathing and when sleeping as has LIBBY.     1/2/21: no real improvement. Having issues with pain medication and sedation not allowing pulmonary toilet. Will not wear BIPAP    1/3/21: Slight improvement today.  Per the nurse she had been using her incentive spirometer and been more ambulatory.  Encourage patient with pulmonology at bedside to continue to increase activity    1/4/2021: Patient seen and examined this morning.  Remains short of breath and on high flow.  Encouraged to ambulate more and use examination amatory, patient agrees.  No other complaints or acute events overnight per nursing.    1/5/2021: Patient seen this morning.  Still the same, shortness of breath the same.  Remains on high flow.  No other complaints.    1/6/2021: Patient seen and examined this morning.   "Feeling slightly better but not significantly improved.  Remains on high flow.  Chest x-ray noted to have bilateral congestion, started on Lasix, echo ordered.    1/7/2021: Patient seen examined this morning.  Feeling a little anxious today but shortness of breath has improved.  Oxygen requirement improving.  No acute events overnight per nursing.     1/8/2021: Patient seen and examined this morning.  Patient feels significant improvement today, weaned off with precision flow and now on high flow at 12 L now.  Only complaint  She wants to eat some solid food and not be on liquid diet anymore.  Discussed with nursing to check with surgery to advance diet if possible today.    1/9/2021: Patient seen and examined this morning.  Having some coughing episode and nausea today after trying some GI soft diet with eggs and hash brown.  Wants to go back on liquid diet, will notify surgery.  Patient still having intermittent anxiety, risperidone as needed helping but not lasting enough, dose increased.  Remains on high flow, wean off as tolerated, discussed with nursing.  Discussed with family at bedside, all questions answered.    Denies fever, chills, chest pain, nausea, vomiting, diarrhea, dysuria, or dizziness.    Review of Systems   All other systems reviewed and are negative.        Objective   Objective      Vital Signs  Temp:  [98.1 °F (36.7 °C)-98.5 °F (36.9 °C)] 98.4 °F (36.9 °C)  Heart Rate:  [] 88  Resp:  [16-20] 18  BP: (129-135)/(51-77) 132/61  Oxygen Therapy  SpO2: (!) 87 %  Pulse Oximetry Type: Continuous  Device (Oxygen Therapy): high-flow nasal cannula, humidified  $ High Flow Nasal Cannula Set-Up: yes  Flow (L/min): 10  Oxygen Concentration (%): 80  Oximetry Probe Site Changed: Yes  Flowsheet Rows      First Filed Value   Admission Height  157.5 cm (62\") Documented at 12/16/2020 1444   Admission Weight  81.6 kg (180 lb) Documented at 12/16/2020 1444        Intake & Output (last 3 days)       01/06 0701 " - 01/07 0700 01/07 0701 - 01/08 0700 01/08 0701 - 01/09 0700 01/09 0701 - 01/10 0700    P.O.  800 600     Total Intake(mL/kg)  800 (9.4) 600 (7.3)     Urine (mL/kg/hr) 2010 (1) 900 (0.4) 600 (0.3)     Stool 0 0      Total Output 2010 900 600     Net -2010 -100 0             Urine Unmeasured Occurrence 2 x 1 x      Stool Unmeasured Occurrence 3 x 3 x          Lines, Drains & Airways    Active LDAs     Name:   Placement date:   Placement time:   Site:   Days:    Peripheral IV 12/31/20 0938 Anterior;Left Forearm   12/31/20 0938    Forearm   less than 1                  Physical Exam:    General: Awake, alert, elderly female, NAD  Eyes: PERRL, EOMI, conjunctive are clear  Cardiovascular: Regular rate and rhythm, no murmurs  Respiratory: Decreased breath sounds bilaterally, no wheezing or rales, unlabored breathing  Abdomen: Soft, tender around incision sites, positive bowel sounds, no guarding  Neurologic: A&O, CN grossly intact, moves all extremities spontaneously  Musculoskeletal: Normal range of motion, no deformities  Skin: Warm, dry, intact         Wounds (last 24 hours)      LDA Wound     Row Name 01/09/21 0400 01/09/21 0000 01/08/21 2000       Wound 12/30/20 1037 abdomen Incision    Wound - Properties Group Placement Date: 12/30/20  -AM Placement Time: 1037  -AM Location: abdomen  -AM Primary Wound Type: Incision  -AM    Closure  BRIANNE  -MS  BRIANNE  -MS  BRIANNE  -MS    Base  dressing in place, unable to visualize  -MS  dressing in place, unable to visualize  -MS  dressing in place, unable to visualize  -MS    Periwound  intact;dry  -MS  intact;dry  -MS  intact;dry  -MS    Retired Wound - Properties Group Date first assessed: 12/30/20  -AM Time first assessed: 1037  -AM Location: abdomen  -AM Primary Wound Type: Incision  -AM    Row Name 01/08/21 1605 01/08/21 1200          Wound 12/30/20 1037 abdomen Incision    Wound - Properties Group Placement Date: 12/30/20  -AM Placement Time: 1037  -AM Location: abdomen  -AM  Primary Wound Type: Incision  -AM    Closure  BRIANNE  -RH  BRIANNE  -RH     Base  dressing in place, unable to visualize  -RH  dressing in place, unable to visualize  -RH     Periwound  intact;dry  -RH  intact;dry  -RH     Retired Wound - Properties Group Date first assessed: 12/30/20  -AM Time first assessed: 1037  -AM Location: abdomen  -AM Primary Wound Type: Incision  -AM      User Key  (r) = Recorded By, (t) = Taken By, (c) = Cosigned By    Initials Name Provider Type    AM Ping Martinez, RN Registered Nurse    Merle Thomas RN Registered Nurse    Daja Lopez RN Registered Nurse          Procedures:    Procedure(s):  Laparoscopic hiatal hernia repair with gastropexy          Results Review:     I reviewed the patient's new clinical results.      Lab Results (last 24 hours)     Procedure Component Value Units Date/Time    Basic Metabolic Panel [[id number]] Collected: 01/09/21 1048    Specimen: Blood Updated: 01/09/21 1104    Magnesium [[id number]] Collected: 01/09/21 1048    Specimen: Blood Updated: 01/09/21 1104        No results found for: HGBA1C        Results from last 7 days   Lab Units 01/02/21  1711   PH, ARTERIAL pH units 7.353   PO2 ART mm Hg 72.0*   PCO2, ARTERIAL mm Hg 48.2*   HCO3 ART mmol/L 26.8     No results found for: LIPASE  Lab Results   Component Value Date    CHOL 374 (H) 01/03/2020    TRIG 311 (H) 01/03/2020    HDL 50 01/03/2020     (H) 01/03/2020       Lab Results   Lab Value Date/Time    FINALDX  12/31/2020 0205     Leukocytosis with absolute atypical lymphocytosis  Anemia  No blasts identified  If CBC indices persist/worsen, consider flow cytometry or other studies as clinically indicated to exclude a lymphoproliferative disorder      FINALDX  02/06/2020 1405     Absolute atypical lymphocytosis.  Anemia.  No blasts identified.  Recommend clinical follow-up and additional studies to include flow cytometry as clinically indicated to exclude CLL/SLL or other lymphoproliferative  disorder.         Microbiology Results (last 10 days)     ** No results found for the last 240 hours. 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 septal MI  Low voltage, precordial leads  When compared with ECG of 18-Dec-2020 14:29:46,  Poor R wave progression V1 to V3 noted  Electronically Signed By: Fausto Jean (PAVAN) 02-Jan-2021 15:05:56  Date and Time of Study: 2020-12-30 13:14:52    Adult Transthoracic Echo Complete W/ Cont if Necessary Per Protocol [[id number]] Collected: 01/06/21 1415     Updated: 01/06/21 1826     BSA 1.8 m^2      RVIDd 2.5 cm      IVSd 0.89 cm      LVIDd 5.1 cm      LVIDs 2.9 cm      LVPWd 0.93 cm      IVS/LVPW 0.96     FS 42.3 %      EDV(Teich) 122.8 ml      ESV(Teich) 33.1 ml      EF(Teich) 73.1 %      EDV(cubed) 131.2 ml      ESV(cubed) 25.2 ml      EF(cubed) 80.8 %      LV mass(C)d 164.9 grams      LV mass(C)dI 90.0 grams/m^2      SV(Teich) 89.7 ml      SI(Teich) 49.0 ml/m^2      SV(cubed) 106.1 ml      SI(cubed) 57.9 ml/m^2      Ao root diam 3.2 cm      Ao root area 8.0 cm^2      ACS 1.7 cm      asc Aorta Diam 2.3 cm      LVOT diam 2.0 cm      LVOT area 3.0 cm^2      RVOT diam 2.1 cm      RVOT area 3.3 cm^2      EDV(MOD-sp4) 71.6 ml      ESV(MOD-sp4) 18.7 ml      EF(MOD-sp4) 73.8 %      SV(MOD-sp4) 52.9 ml      SI(MOD-sp4) 28.9 ml/m^2      Ao root area (BSA corrected) 1.7     LV Leonard Vol (BSA corrected) 39.1 ml/m^2      LV Sys Vol (BSA corrected) 10.2 ml/m^2      Aortic R-R 0.72 sec      Aortic HR 83.4 BPM      MV E max mindy 148.1 cm/sec      MV A max mindy 117.7 cm/sec       MV E/A 1.3     MV V2 max 125.6 cm/sec      MV max PG 6.3 mmHg      MV V2 mean 87.9 cm/sec      MV mean PG 3.4 mmHg      MV V2 VTI 32.8 cm      MVA(VTI) 2.6 cm^2      MV dec slope 900.2 cm/sec^2      MV dec time 0.16 sec      Ao pk garry 162.4 cm/sec      Ao max PG 10.6 mmHg      Ao max PG (full) 4.9 mmHg      Ao V2 mean 115.5 cm/sec      Ao mean PG 6.0 mmHg      Ao mean PG (full) 2.7 mmHg      Ao V2 VTI 34.3 cm      EDILMA(I,A) 2.5 cm^2      EDILMA(I,D) 2.5 cm^2      EDILMA(V,A) 2.2 cm^2      EDILMA(V,D) 2.2 cm^2      AI max garry 441.0 cm/sec      AI max PG 77.8 mmHg      AI dec slope 379.7 cm/sec^2      AI dec time 1.2 sec      AI P1/2t 340.1 msec      LV V1 max PG 5.7 mmHg      LV V1 mean PG 3.2 mmHg      LV V1 max 119.1 cm/sec      LV V1 mean 84.0 cm/sec      LV V1 VTI 27.8 cm      MR max garry 519.2 cm/sec      MR max .8 mmHg      CO(Ao) 23.0 l/min      CI(Ao) 12.6 l/min/m^2      SV(Ao) 275.9 ml      SI(Ao) 150.6 ml/m^2      CO(LVOT) 7.0 l/min      CI(LVOT) 3.8 l/min/m^2      SV(LVOT) 84.1 ml      SV(RVOT) 62.9 ml      SI(LVOT) 45.9 ml/m^2      PA V2 max 90.0 cm/sec      PA max PG 3.2 mmHg      PA max PG (full) 0.43 mmHg      PA V2 mean 72.3 cm/sec      PA mean PG 2.2 mmHg      PA mean PG (full) 0.66 mmHg      PA V2 VTI 23.7 cm      PVA(I,A) 2.7 cm^2      BH CV ECHO KAREN - PVA(I,D) 2.7 cm^2      BH CV ECHO KAREN - PVA(V,A) 3.1 cm^2      BH CV ECHO KAREN - PVA(V,D) 3.1 cm^2      PA acc time 0.11 sec      PI end-d garry 49.7 cm/sec      PI max garry 236.6 cm/sec      PI max PG 22.4 mmHg      RV V1 max PG 2.8 mmHg      RV V1 mean PG 1.5 mmHg      RV V1 max 83.8 cm/sec      RV V1 mean 57.3 cm/sec      RV V1 VTI 19.0 cm      TR max garry 277.2 cm/sec      RVSP(TR) 33.7 mmHg      RAP systole 3.0 mmHg      PA pr(Accel) 27.6 mmHg      Pulm Sys Garry 66.4 cm/sec      Pulm Leonard Garry 43.5 cm/sec      Pulm S/D 1.5     Qp/Qs 0.75     Pulm A Revs Dur 0.09 sec      Pulm A Revs Garry 29.6 cm/sec      BH CV ECHO KAREN - BZI_BMI 33.1 kilograms/m^2        [facility] 1.9 m^2       CV ECHO KAREN - BZI_METRIC_WEIGHT 82.1 kg       CV ECHO KAREN - BZI_METRIC_HEIGHT 157.5 cm      EF(MOD-bp) 74.0 %      LA dimension(2D) 3.9 cm      Echo EF Estimated 65 %     Narrative:      · Estimated left ventricular EF = 65% Estimated left ventricular EF was in   agreement with the calculated left ventricular EF. Left ventricular   systolic function is normal.  · There is mainly affecting the non-coronary and left coronary cusp(s).  · The right ventricular cavity is mildly dilated.  · Estimated right ventricular systolic pressure from tricuspid   regurgitation is normal (<35 mmHg).                  Results for orders placed during the hospital encounter of 12/30/20   Adult Transthoracic Echo Complete W/ Cont if Necessary Per Protocol    Narrative · Estimated left ventricular EF = 65% Estimated left ventricular EF was in   agreement with the calculated left ventricular EF. Left ventricular   systolic function is normal.  · There is mainly affecting the non-coronary and left coronary cusp(s).  · The right ventricular cavity is mildly dilated.  · Estimated right ventricular systolic pressure from tricuspid   regurgitation is normal (<35 mmHg).          Xr Chest 1 View    Result Date: 1/5/2021  Increased pulmonary edema from previous study. Persistent bibasilar airspace opacities atelectasis versus pneumonia. Small bilateral pleural effusions.  Electronically Signed By-Muriel Spencer MD On:1/5/2021 10:10 PM This report was finalized on [id number] by  Muriel Spencer MD.    Xr Chest 1 View    Result Date: 1/2/2021    1.  Low volume inspiration with persistent left lower lobe airspace consolidation which is unchanged. 2.  Small bilateral pleural effusions.   Electronically Signed By-Jan Vernon MD On:1/2/2021 11:39 AM This report was finalized on [id number] by  Jan Vernon MD.          Xrays, labs reviewed personally by physician.    Medication Review:   I have  reviewed the patient's current medication list      Scheduled Meds  amLODIPine, 10 mg, Oral, Daily With Lunch  budesonide-formoterol, 2 puff, Inhalation, BID - RT  docusate sodium, 100 mg, Oral, BID  furosemide, 40 mg, Intravenous, BID  guaiFENesin, 600 mg, Oral, Q12H  ipratropium-albuterol, 3 mL, Nebulization, Q4H - RT  levothyroxine, 100 mcg, Oral, QAM AC  methylnaltrexone, 6 mg, Subcutaneous, Every Other Day  metoclopramide, 10 mg, Intravenous, Q6H  pantoprazole, 40 mg, Oral, Q AM  polyethylene glycol, 17 g, Oral, Daily  rosuvastatin, 10 mg, Oral, Nightly  sodium chloride, 10 mL, Intravenous, Q12H        Meds Infusions  lactated ringers, 9 mL/hr, Last Rate: Stopped (12/30/20 2009)  phenylephrine, 0.5-3 mcg/kg/min        Meds PRN  •  acetaminophen **OR** acetaminophen  •  influenza vaccine  •  lactated ringers  •  magnesium sulfate **OR** magnesium sulfate **OR** magnesium sulfate  •  Morphine  •  [DISCONTINUED] HYDROmorphone **AND** naloxone  •  [DISCONTINUED] Morphine **AND** naloxone  •  ondansetron **OR** ondansetron  •  oxyCODONE  •  phenylephrine  •  potassium chloride **OR** potassium chloride **OR** potassium chloride  •  promethazine **OR** promethazine  •  risperiDONE  •  saline  •  sodium chloride        Assessment/Plan   Assessment/Plan     Active Hospital Problems    Diagnosis  POA   • **Hiatal hernia with gastroesophageal reflux [K21.9, K44.9]  Unknown   • Acute respiratory failure with hypoxia and hypercapnia (CMS/HCC) [J96.01, J96.02]  Unknown   • Abdominal pain [R10.9]  Unknown   • Nocturnal hypoxia [G47.34]  Yes   • LIBBY (obstructive sleep apnea) [G47.33]  Unknown      Resolved Hospital Problems   No resolved problems to display.       MEDICAL DECISION MAKING COMPLEXITY BY PROBLEM:     Acute on chronic hypoxic respiratory failure, LIBBY  LLL PNA  -likely realted to narcotics and LIBBY vs LLL CAP  -CXR 12/31/20: new LLL infiltrate  -CT chest reviewed b/l LL atelectasis vs infiltrate  -Significantly  improved, off persistent flow, wean down to nasal cannula as tolerated  -compliant with AVAP  -Nebulizers, symbicort  -Finished Zosyn  -encourage aggressive pulmonary toilet  -Repeat chest x-ray on 1/6 shows bilateral congestion and atelectasis  -Continue on Lasix IV twice daily, monitor I's and O's  -Echo shows EF of 65% and no diastolic dysfunction  -pulmonary following  -Possibly okay to discharge tomorrow if weaned down to nasal cannula    Hiatal hernia with GERD   Possible postoperative ileus, resolved  -S/p laparoscopic hiatal hernia repair with gastropexy  -General surgery primary postop per them  -PPI, carafate, reglan prn  -Did not tolerate GI soft well  -Diet and bowel regimen per surgery     Hypomagnesemia  -Replace, monitor daily    Chronic anxiety  -Risperidone as needed helps, dose increased  -Monitor     Hypertension  -Continue home meds  -Monitor and adjust blood pressure prn    CLL  -WBC chronically elevated,(baseline 15-20 range)  -Follow CBC while here    DVT prophylaxis  -Per primary      VTE Prophylaxis -   Mechanical Order History:      Ordered        12/30/20 1513  Place Sequential Compression Device  Once         12/30/20 1513  Place Sequential Compression Device  Once         12/30/20 1513  Maintain Sequential Compression Device  Continuous                 Pharmalogical Order History:     None            Code Status -   Code Status and Medical Interventions:   Ordered at: 12/30/20 1513     Code Status:    CPR     Medical Interventions (Level of Support Prior to Arrest):    Full       This patient has been examined wearing appropriate Personal Protective Equipment. 01/09/21        Discharge Planning  Possible discharge tomorrow if weaned down to nasal cannula        Electronically signed by Meg Lund DO, 01/09/21, 11:26 EST.  [facility] Hospitalist Team

## 2021-01-09 NOTE — PLAN OF CARE
Goal Outcome Evaluation:  Plan of Care Reviewed With: patient  Progress: improving   Afebrile. Vital signs stable. Diet advanced to soft to chew. Initially patient was decreased to 6L high flow with good oxygen saturations. At 03:40 oxygen demand increased to 10L/min. Monitor shows ST. Daughter at bedside. Will continue to monitor closely.

## 2021-01-10 LAB
ANION GAP SERPL CALCULATED.3IONS-SCNC: 12 MMOL/L (ref 5–15)
BUN SERPL-MCNC: 30 MG/DL (ref 8–23)
BUN/CREAT SERPL: 18.4 (ref 7–25)
CALCIUM SPEC-SCNC: 8.6 MG/DL (ref 8.6–10.5)
CHLORIDE SERPL-SCNC: 93 MMOL/L (ref 98–107)
CO2 SERPL-SCNC: 30 MMOL/L (ref 22–29)
CREAT SERPL-MCNC: 1.63 MG/DL (ref 0.57–1)
GFR SERPL CREATININE-BSD FRML MDRD: 30 ML/MIN/1.73
GLUCOSE SERPL-MCNC: 117 MG/DL (ref 65–99)
MAGNESIUM SERPL-MCNC: 2.6 MG/DL (ref 1.6–2.4)
POTASSIUM SERPL-SCNC: 4.2 MMOL/L (ref 3.5–5.2)
SODIUM SERPL-SCNC: 135 MMOL/L (ref 136–145)

## 2021-01-10 PROCEDURE — 99232 SBSQ HOSP IP/OBS MODERATE 35: CPT | Performed by: INTERNAL MEDICINE

## 2021-01-10 PROCEDURE — 83735 ASSAY OF MAGNESIUM: CPT | Performed by: INTERNAL MEDICINE

## 2021-01-10 PROCEDURE — 80048 BASIC METABOLIC PNL TOTAL CA: CPT | Performed by: INTERNAL MEDICINE

## 2021-01-10 PROCEDURE — 94799 UNLISTED PULMONARY SVC/PX: CPT

## 2021-01-10 PROCEDURE — 97116 GAIT TRAINING THERAPY: CPT

## 2021-01-10 PROCEDURE — 25010000002 METOCLOPRAMIDE PER 10 MG: Performed by: SURGERY

## 2021-01-10 PROCEDURE — 25010000002 METHYLNALTREXONE 12 MG/0.6ML SOLUTION: Performed by: SURGERY

## 2021-01-10 RX ORDER — SODIUM CHLORIDE 9 MG/ML
75 INJECTION, SOLUTION INTRAVENOUS ONCE
Status: COMPLETED | OUTPATIENT
Start: 2021-01-10 | End: 2021-01-10

## 2021-01-10 RX ORDER — SODIUM CHLORIDE 9 MG/ML
75 INJECTION, SOLUTION INTRAVENOUS CONTINUOUS
Status: DISCONTINUED | OUTPATIENT
Start: 2021-01-10 | End: 2021-01-12

## 2021-01-10 RX ADMIN — RISPERIDONE 0.5 MG: 0.5 TABLET, ORALLY DISINTEGRATING ORAL at 21:19

## 2021-01-10 RX ADMIN — Medication 10 ML: at 09:12

## 2021-01-10 RX ADMIN — GUAIFENESIN 600 MG: 600 TABLET, EXTENDED RELEASE ORAL at 09:12

## 2021-01-10 RX ADMIN — OXYCODONE 5 MG: 5 TABLET ORAL at 18:09

## 2021-01-10 RX ADMIN — LEVOTHYROXINE SODIUM 100 MCG: 125 TABLET ORAL at 09:12

## 2021-01-10 RX ADMIN — MIRTAZAPINE 7.5 MG: 7.5 TABLET ORAL at 21:20

## 2021-01-10 RX ADMIN — Medication 10 ML: at 21:23

## 2021-01-10 RX ADMIN — METOCLOPRAMIDE HYDROCHLORIDE 10 MG: 5 INJECTION INTRAMUSCULAR; INTRAVENOUS at 05:52

## 2021-01-10 RX ADMIN — IPRATROPIUM BROMIDE AND ALBUTEROL SULFATE 3 ML: 2.5; .5 SOLUTION RESPIRATORY (INHALATION) at 15:00

## 2021-01-10 RX ADMIN — PANTOPRAZOLE SODIUM 40 MG: 40 TABLET, DELAYED RELEASE ORAL at 05:52

## 2021-01-10 RX ADMIN — METOCLOPRAMIDE HYDROCHLORIDE 10 MG: 5 INJECTION INTRAMUSCULAR; INTRAVENOUS at 12:19

## 2021-01-10 RX ADMIN — DOCUSATE SODIUM 100 MG: 100 CAPSULE, LIQUID FILLED ORAL at 09:12

## 2021-01-10 RX ADMIN — METOCLOPRAMIDE HYDROCHLORIDE 10 MG: 5 INJECTION INTRAMUSCULAR; INTRAVENOUS at 18:09

## 2021-01-10 RX ADMIN — IPRATROPIUM BROMIDE AND ALBUTEROL SULFATE 3 ML: 2.5; .5 SOLUTION RESPIRATORY (INHALATION) at 06:58

## 2021-01-10 RX ADMIN — SODIUM CHLORIDE 75 ML/HR: 9 INJECTION, SOLUTION INTRAVENOUS at 12:19

## 2021-01-10 RX ADMIN — SODIUM CHLORIDE 75 ML/HR: 9 INJECTION, SOLUTION INTRAVENOUS at 09:12

## 2021-01-10 RX ADMIN — GUAIFENESIN 600 MG: 600 TABLET, EXTENDED RELEASE ORAL at 21:19

## 2021-01-10 RX ADMIN — POLYETHYLENE GLYCOL 3350 17 G: 17 POWDER, FOR SOLUTION ORAL at 09:12

## 2021-01-10 RX ADMIN — ROSUVASTATIN CALCIUM 10 MG: 10 TABLET, FILM COATED ORAL at 21:19

## 2021-01-10 RX ADMIN — DOCUSATE SODIUM 100 MG: 100 CAPSULE, LIQUID FILLED ORAL at 21:20

## 2021-01-10 RX ADMIN — IPRATROPIUM BROMIDE AND ALBUTEROL SULFATE 3 ML: 2.5; .5 SOLUTION RESPIRATORY (INHALATION) at 20:02

## 2021-01-10 RX ADMIN — AMLODIPINE BESYLATE 10 MG: 5 TABLET ORAL at 12:19

## 2021-01-10 RX ADMIN — OXYCODONE 5 MG: 5 TABLET ORAL at 12:19

## 2021-01-10 RX ADMIN — RISPERIDONE 0.5 MG: 0.5 TABLET, ORALLY DISINTEGRATING ORAL at 09:12

## 2021-01-10 RX ADMIN — METHYLNALTREXONE BROMIDE 6 MG: 12 INJECTION, SOLUTION SUBCUTANEOUS at 09:15

## 2021-01-10 NOTE — PROGRESS NOTES
"PULMONARY CRITICAL CARE Progress  NOTE      PATIENT IDENTIFICATION:  Name: Diane Guan  MRN: [id number]  :  1940     Age: 80 y.o.  Sex: female    DATE OF Note:  1/10/2021   Referring Physician: Den Plummer DO                  Subjective:   Complaining from back pain  On 5 L NC now   No chest pain, no nausea or vomiting, no change in bowel habit, no dysuria,  no new  skin rash or itching.      Objective:  tMax 24 hrs: Temp (24hrs), Av.5 °F (36.9 °C), Min:98.4 °F (36.9 °C), Max:98.7 °F (37.1 °C)      Vitals Ranges:   Temp:  [98.4 °F (36.9 °C)-98.7 °F (37.1 °C)] 98.5 °F (36.9 °C)  Heart Rate:  [] 95  Resp:  [16-20] 20  BP: (138-139)/(48-62) 139/48    Intake and Output Last 3 Shifts:   I/O last 3 completed shifts:  In: 560 [P.O.:560]  Out: 700 [Urine:700]    Exam:  /48 (BP Location: Left arm, Patient Position: Lying)   Pulse 95   Temp 98.5 °F (36.9 °C) (Oral)   Resp 20   Ht 157.5 cm (62\")   Wt 82.8 kg (182 lb 8.7 oz)   SpO2 95%   BMI 33.39 kg/m²     General Appearance:   Alert awake  HEENT:  Normocephalic, without obvious abnormality, Conjunctiva/corneas clear,.  Normal external ear canals, Nares normal, no drainage     Neck:  Supple, symmetrical, trachea midline. No JVD.  Lungs /Chest wall:   Bilateral basal rhonchi, respirations unlabored symmetrical wall movement.     Heart:  Regular rate and rhythm, systolic murmur PMI left sternal border  Abdomen: Soft, non-tender, no masses, no organomegaly.    Extremities: Trace edema no clubbing or Cyanosis        Medications:    Current Facility-Administered Medications:   •  acetaminophen (TYLENOL) tablet 650 mg, 650 mg, Oral, Q4H PRN, 650 mg at 21 1338 **OR** acetaminophen (TYLENOL) suppository 650 mg, 650 mg, Rectal, Q4H PRN, Den Plummer DO  •  amLODIPine (NORVASC) tablet 10 mg, 10 mg, Oral, Daily With Lunch, Bob Lewis DO, 10 mg at 01/10/21 1219  •  budesonide-formoterol (SYMBICORT) 160-4.5 MCG/ACT inhaler 2 puff, 2 " puff, Inhalation, BID - RT, Bob Lewis DO, 2 puff at 01/09/21 0700  •  docusate sodium (COLACE) capsule 100 mg, 100 mg, Oral, BID, Bob Lewis DO, 100 mg at 01/10/21 0912  •  guaiFENesin (MUCINEX) 12 hr tablet 600 mg, 600 mg, Oral, Q12H, Bob Lewis DO, 600 mg at 01/10/21 0912  •  influenza vac split quad (FLUZONE,FLUARIX,AFLURIA,FLULAVAL) injection 0.5 mL, 0.5 mL, Intramuscular, During Hospitalization, Bob Lewis DO  •  ipratropium-albuterol (DUO-NEB) nebulizer solution 3 mL, 3 mL, Nebulization, Q4H - RT, Bob Lewis DO, 3 mL at 01/10/21 0658  •  lactated ringers infusion, 9 mL/hr, Intravenous, Continuous PRN, Den Plummer DO, Stopped at 12/30/20 2009  •  levothyroxine (SYNTHROID, LEVOTHROID) tablet 100 mcg, 100 mcg, Oral, QAM AC, Bob Lewis DO, 100 mcg at 01/10/21 0912  •  Magnesium Sulfate 2 gram Bolus, followed by 8 gram infusion (total Mg dose 10 grams)- Mg less than or equal to 1mg/dL, 2 g, Intravenous, PRN **OR** Magnesium Sulfate 2 gram / 50mL Infusion (GIVE X 3 BAGS TO EQUAL 6GM TOTAL DOSE) - Mg 1.1 - 1.5 mg/dl, 2 g, Intravenous, PRN, Last Rate: 25 mL/hr at 01/08/21 0824, 2 g at 01/08/21 0824 **OR** Magnesium Sulfate 4 gram infusion- Mg 1.6-1.9 mg/dL, 4 g, Intravenous, PRN, Meg Lund,   •  methylnaltrexone (RELISTOR) injection 6 mg, 6 mg, Subcutaneous, Every Other Day, Shanna Goins MD, 6 mg at 01/10/21 0915  •  metoclopramide (REGLAN) injection 10 mg, 10 mg, Intravenous, Q6H, Shanna Goins MD, 10 mg at 01/10/21 1219  •  mirtazapine (REMERON) tablet 7.5 mg, 7.5 mg, Oral, Nightly, Meg Lund, DO, 7.5 mg at 01/09/21 2113  •  Morphine sulfate (PF) injection 2 mg, 2 mg, Intravenous, Q4H PRN, Bob Lewis, , 2 mg at 01/08/21 0015  •  [DISCONTINUED] HYDROmorphone (DILAUDID) injection 0.5 mg, 0.5 mg, Intravenous, Q2H PRN, 0.5 mg at 12/31/20 4929 **AND** naloxone (NARCAN) injection 0.1 mg, 0.1 mg, Intravenous, Q5 Min PRN, Den Plummer, DO  •  [DISCONTINUED] Morphine sulfate  (PF) injection 4 mg, 4 mg, Intravenous, Q2H PRN **AND** naloxone (NARCAN) injection 0.4 mg, 0.4 mg, Intravenous, Q5 Min PRN, Den Plummer, DO  •  ondansetron (ZOFRAN) tablet 4 mg, 4 mg, Oral, Q6H PRN, 4 mg at 01/09/21 2114 **OR** ondansetron (ZOFRAN) injection 4 mg, 4 mg, Intravenous, Q6H PRN, Den Plummer, DO, 4 mg at 01/09/21 0926  •  oxyCODONE (ROXICODONE) immediate release tablet 5 mg, 5 mg, Oral, Q8H PRN, Shanna Goins MD, 5 mg at 01/10/21 1219  •  pantoprazole (PROTONIX) EC tablet 40 mg, 40 mg, Oral, Q AM, Den Plummer, DO, 40 mg at 01/10/21 0552  •  phenylephrine (AUDRA-SYNEPHRINE) 50 mg in 250 mL NS infusion, 0.5-3 mcg/kg/min, Intravenous, Continuous PRN, Den Plummer, DO  •  polyethylene glycol (MIRALAX) packet 17 g, 17 g, Oral, Daily, Shanna Goins MD, 17 g at 01/10/21 0912  •  potassium chloride (K-DUR,KLOR-CON) CR tablet 40 mEq, 40 mEq, Oral, PRN, 40 mEq at 01/06/21 1142 **OR** potassium chloride (KLOR-CON) packet 40 mEq, 40 mEq, Oral, PRN **OR** potassium chloride 10 mEq in 100 mL IVPB, 10 mEq, Intravenous, Q1H PRN, Meg Lund, DO  •  promethazine (PHENERGAN) tablet 12.5 mg, 12.5 mg, Oral, Q6H PRN **OR** promethazine (PHENERGAN) suppository 12.5 mg, 12.5 mg, Rectal, Q6H PRN, Den Plummer, DO  •  risperiDONE (risperDAL M-TABS) disintegrating tablet 0.5 mg, 0.5 mg, Oral, BID PRN, Meg Lund, , 0.5 mg at 01/10/21 0912  •  rosuvastatin (CRESTOR) tablet 10 mg, 10 mg, Oral, Nightly, Bob Lewis, DO, 10 mg at 01/09/21 2114  •  saline (AYR) nasal gel, , Topical, PRN, Bob Lewis, DO  •  sodium chloride 0.9 % flush 10 mL, 10 mL, Intravenous, Q12H, Lund, Ramírezlaram, DO, 10 mL at 01/10/21 0912  •  sodium chloride 0.9 % flush 10 mL, 10 mL, Intravenous, PRN, Meg Lund, DO, 10 mL at 01/06/21 0521  •  sodium chloride 0.9 % infusion, 75 mL/hr, Intravenous, Continuous, Meg Lund, DO, Last Rate: 75 mL/hr at 01/10/21 1219, 75 mL/hr at 01/10/21 1219    Data Review:  All labs (24hrs):    Recent Results (from the past 24 hour(s))   Basic Metabolic Panel    Collection Time: 01/10/21  4:12 AM    Specimen: Blood   Result Value Ref Range    Glucose 117 (H) 65 - 99 mg/dL    BUN 30 (H) 8 - 23 mg/dL    Creatinine 1.63 (H) 0.57 - 1.00 mg/dL    Sodium 135 (L) 136 - 145 mmol/L    Potassium 4.2 3.5 - 5.2 mmol/L    Chloride 93 (L) 98 - 107 mmol/L    CO2 30.0 (H) 22.0 - 29.0 mmol/L    Calcium 8.6 8.6 - 10.5 mg/dL    eGFR Non African Amer 30 (L) >60 mL/min/1.73    BUN/Creatinine Ratio 18.4 7.0 - 25.0    Anion Gap 12.0 5.0 - 15.0 mmol/L   Magnesium    Collection Time: 01/10/21  4:12 AM    Specimen: Blood   Result Value Ref Range    Magnesium 2.6 (H) 1.6 - 2.4 mg/dL        Imaging:  Adult Transthoracic Echo Complete W/ Cont if Necessary Per Protocol  · Estimated left ventricular EF = 65% Estimated left ventricular EF was in   agreement with the calculated left ventricular EF. Left ventricular   systolic function is normal.  · There is mainly affecting the non-coronary and left coronary cusp(s).  · The right ventricular cavity is mildly dilated.  · Estimated right ventricular systolic pressure from tricuspid   regurgitation is normal (<35 mmHg).          ASSESSMENT:    Acute respiratory failure with hypoxia and hypercapnia (CMS/HCC)   Hiatal hernia with gastroesophageal reflux    LIBBY (obstructive sleep apnea)    Nocturnal hypoxia    Abdominal pain       PLAN:  conitnue  diuretics  PT OT  I-S flutter valve  Bronchodilator  Inhaled corticosteroids  Electrolytes/ glycemic control  DVT and GI prophylaxis.    Total Critical care time in direct medical management (   ) minutes  Wily Rutherford MD. D, ABSM.     1/10/2021  13:35 EST

## 2021-01-10 NOTE — PLAN OF CARE
Problem: Adult Inpatient Plan of Care  Goal: Plan of Care Review  Outcome: Ongoing, Progressing  Flowsheets  Taken 1/10/2021 0939 by Leonor Dunn, RN  Outcome Summary: Pt op lap hernia repair with gastropexy, Pt on 8L HF. Daughter at bedside. No distress noted. Will continue to monitor.  Taken 1/10/2021 0625 by Daja Whitaker RN  Plan of Care Reviewed With: patient   Goal Outcome Evaluation:  Plan of Care Reviewed With: patient  Progress: no change  Outcome Summary: Pt op lap hernia repair with gastropexy, Pt on 8L HF. Daughter at bedside. No distress noted. Will continue to monitor.

## 2021-01-10 NOTE — PLAN OF CARE
Goal Outcome Evaluation:  Plan of Care Reviewed With: patient  Progress: no change   Vital signs stable. Slept better this shift after taking Remeron at HS. Monitor shows SR/ST. Nasal oxygen remains at 8L/min. Will continue to monitor closely.

## 2021-01-10 NOTE — THERAPY TREATMENT NOTE
Patient Name: Diane Guan  : 1940    MRN: [id number]                              Today's Date: 1/10/2021       Admit Date: 2020    Visit Dx:     ICD-10-CM ICD-9-CM   1. Hiatal hernia with gastroesophageal reflux  K21.9 530.81    K44.9 553.3     Patient Active Problem List   Diagnosis   • Abnormality of gait   • Mixed anxiety and depressive disorder   • Primary osteoarthritis involving multiple joints   • Breast cancer (CMS/[facility])   • Chronic lymphoid leukemia (CMS/[facility])   • Depression   • Gallbladder disorder   • Hiatal hernia with gastroesophageal reflux   • Mixed hyperlipidemia   • Essential hypertension   • Acquired hypothyroidism   • Type 2 diabetes mellitus without complication, without long-term current use of insulin (CMS/[facility])   • Insomnia   • Postmenopausal status   • Shoulder pain   • Overactive bladder   • Vitamin B 12 deficiency   • Seasonal allergies   • Absolute anemia   • Vitamin D deficiency   • LIBBY (obstructive sleep apnea)   • Nocturnal hypoxia   • Acute respiratory failure with hypoxia and hypercapnia (CMS/[facility])   • Abdominal pain     Past Medical History:   Diagnosis Date   • Acute bronchitis due to human metapneumovirus 2020   • Anxiety disorder    • Arthritis    • Breast cancer (CMS/[facility]) 2019    Invasive ductal carcinoma   • Chronic lymphocytic leukemia (CLL), B-cell (CMS/[facility]) 2019   • Depression    • Disease of thyroid gland    • Diverticulosis of colon 2018    Identified on colonoscopy   • Dysphagia 2020   • Dyspnea on exertion    • Hemorrhoid    • Hiatal hernia 2020   • Hiatal hernia with GERD     large hiatal hernia with high-grade reflux on barium swallow; s/p laparoscopic fundoplication with gastropexy   • History of diabetes mellitus     no meds now   • Hyperlipidemia    • Hypertension    • Mycobacterium avium complex (CMS/[facility]) 2019    aspiration pneumonia; completed abx therapy   • OAB (overactive bladder)    • Sleep apnea     daughter states pt does  not have and doesn't have machine     Past Surgical History:   Procedure Laterality Date   • BLADDER SURGERY     • BRONCHOSCOPY N/A 9/13/2019    Procedure: BRONCHOSCOPY with bronchial washing;  Surgeon: Marnie Frankel MD;  Location: [facility] ENDOSCOPY;  Service: Pulmonary   • COLONOSCOPY  07/19/2018    severe diverticulosis   • CYST REMOVAL      Removed a fatty cyst off of her back   • ENDOSCOPY N/A 11/23/2020    Procedure: ESOPHAGOGASTRODUODENOSCOPY with dilatation (Bougie # 48, 50, 52, 54, 56, 58);  Surgeon: Den Plummer DO;  Location: [facility] ENDOSCOPY;  Service: General;  Laterality: N/A;  Post: large hiatal hernia, joshua's ulcers   • HIATAL HERNIA REPAIR N/A 12/30/2020    Procedure: Laparoscopic hiatal hernia repair with gastropexy;  Surgeon: Den Plummer DO;  Location: [facility] MAIN OR;  Service: General;  Laterality: N/A;   • MASTECTOMY Right 02/04/2019    Invasive ductal carcinoma   • TUBAL ABDOMINAL LIGATION       General Information     Row Name 01/10/21 1610          Physical Therapy Time and Intention    Document Type  therapy note (daily note)  -     Mode of Treatment  physical therapy  -     Row Name 01/10/21 1610          General Information    Patient Profile Reviewed  yes  -     Existing Precautions/Restrictions  fall;oxygen therapy device and L/min  -     Row Name 01/10/21 1610          Safety Issues, Functional Mobility    Impairments Affecting Function (Mobility)  balance;endurance/activity tolerance;strength;pain  -       User Key  (r) = Recorded By, (t) = Taken By, (c) = Cosigned By    Initials Name Provider Type     Florence Aragon PTA Physical Therapy Assistant        Mobility     Row Name 01/10/21 1611          Bed Mobility    Bed Mobility  bed mobility (all) activities  -     All Activities, [place] (Bed Mobility)  supervision logroll  to R  -     Assistive Device (Bed Mobility)  bed rails  -     Row Name 01/10/21 1611          Sit-Stand Transfer    Sit-Stand  [place] (Transfers)  contact guard  -     Assistive Device (Sit-Stand Transfers)  walker, front-wheeled  -MC     Row Name 01/10/21 1611          Gait/Stairs (Locomotion)    [place] Level (Gait)  contact guard  -     Assistive Device (Gait)  walker, front-wheeled  -MC     Distance in Feet (Gait)  60  -MC     Comment (Gait/Stairs)  slowed jey, unable to be challenged,  -       User Key  (r) = Recorded By, (t) = Taken By, (c) = Cosigned By    Initials Name Provider Type    Florence Tabares PTA Physical Therapy Assistant        Obj/Interventions     Row Name 01/10/21 1613          Balance    Balance Assessment  sitting static balance;standing dynamic balance;sitting dynamic balance;standing static balance  -     Static Sitting Balance  WFL;sitting, edge of bed  -     Dynamic Sitting Balance  WFL;sitting, edge of bed  -     Static Standing Balance  mild impairment;supported  -     Dynamic Standing Balance  mild impairment;supported  -       User Key  (r) = Recorded By, (t) = Taken By, (c) = Cosigned By    Initials Name Provider Type    Florence Tabares PTA Physical Therapy Assistant        Goals/Plan     Row Name 01/10/21 1616          Bed Mobility Goal 1 (PT)    Progress/Outcomes (Bed Mobility Goal 1, PT)  goal ongoing  -     Row Name 01/10/21 1616          Transfer Goal 1 (PT)    Progress/Outcome (Transfer Goal 1, PT)  goal ongoing  -     Row Name 01/10/21 1616          Gait Training Goal 1 (PT)    Progress/Outcome (Gait Training Goal 1, PT)  goal ongoing  -     Row Name 01/10/21 1616          Patient Education Goal (PT)    Progress/Outcome (Patient Education Goal, PT)  goal ongoing  -       User Key  (r) = Recorded By, (t) = Taken By, (c) = Cosigned By    Initials Name Provider Type    Florence Tabares PTA Physical Therapy Assistant        Clinical Impression     Row Name 01/10/21 1614          Pain Scale: Numbers Pre/Post-Treatment    Pre/Posttreatment Pain Comment   c/o her back hurting but did not rate  -     Row Name 01/10/21 1614          Plan of Care Review    Plan of Care Reviewed With  patient  -     Progress  improving  -     Outcome Summary  pt able to amb a little further today using rwx x6'. Still fatigues easily , congested. Refusing HH and supposed to dc home  when stable. GLoves,mask/goggls worn for tx  -     Row Name 01/10/21 1614          Therapy Assessment/Plan (PT)    Rehab Potential (PT)  good, to achieve stated therapy goals  -     Criteria for Skilled Interventions Met (PT)  skilled treatment is necessary  -     Row Name 01/10/21 1614          Vital Signs    Pretreatment Heart Rate (beats/min)  97  -     Intratreatment Heart Rate (beats/min)  109  -     Posttreatment Heart Rate (beats/min)  94  -     Pre SpO2 (%)  90  -     O2 Delivery Pre Treatment  -- 8L  -     Post SpO2 (%)  96  -     Row Name 01/10/21 1614          Positioning and Restraints    Pre-Treatment Position  in bed  -     Post Treatment Position  bed  -     In Bed  side lying right;call light within reach;exit alarm on  -       User Key  (r) = Recorded By, (t) = Taken By, (c) = Cosigned By    Initials Name Provider Type     Florence Aragon, VASHTI Physical Therapy Assistant        Outcome Measures    No documentation.       Physical Therapy Education                 Title: PT OT SLP Therapies (Done)     Topic: Physical Therapy (Done)     Point: Mobility training (Done)     Learning Progress Summary           Patient Acceptance, E,TB, VU by  at 1/10/2021 1617    Acceptance, E, DU,NR by  at 1/8/2021 1543    Comment: Reinforced safe hand placement c rwx. use and transfers.    Acceptance, E, VU by JS at 1/8/2021 0312    Acceptance, D,E, DU,NR by  at 1/7/2021 1604    Comment: Reinforced safe hand placement c rwx. use and transfers.    Acceptance, E,TB, VU by EL at 1/5/2021 1325    Acceptance, E,TB, VU by SC at 1/4/2021 1905    Acceptance, E,TB, VU by  at  1/3/2021 1712    Acceptance, E,TB, VU by MI at 12/31/2020 1121   Family Acceptance, E, VU by  at 1/8/2021 0312                   Point: Precautions (Done)     Learning Progress Summary           Patient Acceptance, E,TB, VU by  at 1/10/2021 1617    Acceptance, E, DU,NR by  at 1/8/2021 1543    Comment: Reinforced safe hand placement c rwx. use and transfers.    Acceptance, E, VU by  at 1/8/2021 0312    Acceptance, D,E, DU,NR by  at 1/7/2021 1604    Comment: Reinforced safe hand placement c rwx. use and transfers.    Acceptance, E,TB, VU by  at 1/5/2021 1325    Acceptance, E,TB, VU by SC at 1/4/2021 1905    Acceptance, E,TB, VU by MI at 12/31/2020 1121   Family Acceptance, E, VU by  at 1/8/2021 0312                               User Key     Initials Effective Dates Name Provider Type Discipline     03/01/19 -  Kinga Delatorre, PT Physical Therapist PT    SC 03/01/19 -  Mya Lawton PTA Physical Therapy Assistant PT     03/01/19 -  Florence Aragon PTA Physical Therapy Assistant PT     03/01/19 -  Jess Marion PTA Physical Therapy Assistant PT     06/23/20 -  Chencho Rojas, NELSON Physical Therapist PT     06/19/20 -  Anjum Ortez, RN Registered Nurse Nurse    MI 03/01/19 -  Ubaldo Elder RN Registered Nurse Nurse              PT Recommendation and Plan  Planned Therapy Interventions (PT): balance training, bed mobility training, gait training, patient/family education, transfer training, postural re-education, strengthening  Plan of Care Reviewed With: patient  Progress: improving  Outcome Summary: pt able to amb a little further today using rwx x6'. Still fatigues easily , congested. Refusing HH and supposed to dc home  when stable. GLoves,mask/goggls worn for tx     Time Calculation:   PT Charges     Row Name 01/10/21 1617             Time Calculation    Start Time  1015  -      Stop Time  1031  -      Time Calculation (min)  16 min  -      PT Received On  01/10/21  -      PT -  Next Appointment  01/11/21  -         Time Calculation- PT    Total Timed Code Minutes- PT  16 minute(s)  -        User Key  (r) = Recorded By, (t) = Taken By, (c) = Cosigned By    Initials Name Provider Type    Florence Tabares PTA Physical Therapy Assistant        Therapy Charges for Today     Code Description Service Date Service Provider Modifiers Qty    [id number] HC GAIT TRAINING EA 15 MIN 1/10/2021 Florence Aragon PTA GP 1          PT G-Codes  Outcome Measure Options: AM-PAC 6 Clicks Basic Mobility (PT)  AM-PAC 6 Clicks Score (PT): 21    Florence Aragon PTA  1/10/2021

## 2021-01-10 NOTE — PROGRESS NOTES
[facility] Medicine Services Daily Progress Note      Hospitalist Team  LOS 10 days      Patient Care Team:  Jemima Fontaine MD as PCP - General (Family Medicine)    Patient Location: 2105/1      Subjective   Subjective     Chief Complaint / Subjective  No chief complaint on file.  follow up shortness of breath       Brief Synopsis of Hospital Course/HPI     80-year-old female who was having issues with GERD was found to have hiatal hernia and is underwent fixation per general surgery.  She has been admitted postoperatively medicine has been consulted to help with her medical problems.  Her blood pressure stable she is having some apnea spells from the sedation but overall her breathing is stable.  No other issues.  As reported she is slightly sedated from the procedure and HPI was slightly limited.         Date::    12/31/20: had worsening respiratory status last night and has been on HFNC this am. Refusing to wear BIPAP although likely needs it fo LIBBY.    1/1/20: on BIPAP this am and thus HPI limited. Events of overnight noted was moved to higher level of care. O2 is stable today. Seems O2 worse when not deep breathing and when sleeping as has LIBBY.     1/2/21: no real improvement. Having issues with pain medication and sedation not allowing pulmonary toilet. Will not wear BIPAP    1/3/21: Slight improvement today.  Per the nurse she had been using her incentive spirometer and been more ambulatory.  Encourage patient with pulmonology at bedside to continue to increase activity    1/4/2021: Patient seen and examined this morning.  Remains short of breath and on high flow.  Encouraged to ambulate more and use examination amatory, patient agrees.  No other complaints or acute events overnight per nursing.    1/5/2021: Patient seen this morning.  Still the same, shortness of breath the same.  Remains on high flow.  No other complaints.    1/6/2021: Patient seen and examined this morning.   Feeling slightly better but not significantly improved.  Remains on high flow.  Chest x-ray noted to have bilateral congestion, started on Lasix, echo ordered.    1/7/2021: Patient seen examined this morning.  Feeling a little anxious today but shortness of breath has improved.  Oxygen requirement improving.  No acute events overnight per nursing.     1/8/2021: Patient seen and examined this morning.  Patient feels significant improvement today, weaned off with precision flow and now on high flow at 12 L now.  Only complaint  She wants to eat some solid food and not be on liquid diet anymore.  Discussed with nursing to check with surgery to advance diet if possible today.    1/9/2021: Patient seen and examined this morning.  Having some coughing episode and nausea today after trying some GI soft diet with eggs and hash brown.  Wants to go back on liquid diet, will notify surgery.  Patient still having intermittent anxiety, risperidone as needed helping but not lasting enough, dose increased.  Remains on high flow, wean off as tolerated, discussed with nursing.  Discussed with family at bedside, all questions answered.    1/10/2021: Patient seen examined this morning.  Feeling much better today, remains on high flow, discussed with nursing to wean off nasal cannula today.  Still LEXUS, IVF restarted.  Plan for discharge once weaned down to nasal cannula home requirement.    Denies fever, chills, chest pain, nausea, vomiting, diarrhea, dysuria, or dizziness.    Review of Systems   All other systems reviewed and are negative.        Objective   Objective      Vital Signs  Temp:  [98.4 °F (36.9 °C)-98.7 °F (37.1 °C)] 98.7 °F (37.1 °C)  Heart Rate:  [] 79  Resp:  [16-20] 18  BP: (133-139)/(51-63) 139/51  Oxygen Therapy  SpO2: 91 %  Pulse Oximetry Type: Continuous  Device (Oxygen Therapy): high-flow nasal cannula  $ High Flow Nasal Cannula Set-Up: yes  Flow (L/min): 8  Oxygen Concentration (%): 80  Oximetry Probe Site  "Changed: Yes  Flowsheet Rows      First Filed Value   Admission Height  157.5 cm (62\") Documented at 12/16/2020 1444   Admission Weight  81.6 kg (180 lb) Documented at 12/16/2020 1444        Intake & Output (last 3 days)       01/07 0701 - 01/08 0700 01/08 0701 - 01/09 0700 01/09 0701 - 01/10 0700 01/10 0701 - 01/11 0700    P.O. 800 600 560     Total Intake(mL/kg) 800 (9.4) 600 (7.3) 560 (6.8)     Urine (mL/kg/hr) 900 (0.4) 600 (0.3) 500 (0.3)     Stool 0       Total Output 900 600 500     Net -100 0 +60             Urine Unmeasured Occurrence 1 x       Stool Unmeasured Occurrence 3 x           Lines, Drains & Airways    Active LDAs     Name:   Placement date:   Placement time:   Site:   Days:    Peripheral IV 12/31/20 0938 Anterior;Left Forearm   12/31/20    0938    Forearm   less than 1                  Physical Exam:    General: Awake, alert, elderly female, NAD  Eyes: PERRL, EOMI, conjunctive are clear  Cardiovascular: Regular rate and rhythm, no murmurs  Respiratory: Decreased breath sounds bilaterally, no wheezing or rales, unlabored breathing  Abdomen: Soft, tender around incision sites, positive bowel sounds, no guarding  Neurologic: A&O, CN grossly intact, moves all extremities spontaneously  Musculoskeletal: Normal range of motion, no deformities  Skin: Warm, dry, intact         Wounds (last 24 hours)      LDA Wound     Row Name 01/10/21 0800 01/10/21 0400 01/10/21 0200       Wound 12/30/20 1037 abdomen Incision    Wound - Properties Group Placement Date: 12/30/20  -AM Placement Time: 1037  -AM Location: abdomen  -AM Primary Wound Type: Incision  -AM    Closure  BRIANNE  -MV  BRIANNE  -MS  BRIANNE  -MS    Base  dressing in place, unable to visualize  -MV  dressing in place, unable to visualize  -MS  dressing in place, unable to visualize  -MS    Periwound  intact;dry  -MV  intact;dry  -MS  intact;dry  -MS    Retired Wound - Properties Group Date first assessed: 12/30/20  -AM Time first assessed: 1037  -AM Location: " abdomen  -AM Primary Wound Type: Incision  -AM    Row Name 01/10/21 0000 01/09/21 2000 01/09/21 1600       Wound 12/30/20 1037 abdomen Incision    Wound - Properties Group Placement Date: 12/30/20  -AM Placement Time: 1037  -AM Location: abdomen  -AM Primary Wound Type: Incision  -AM    Closure  BRIANNE  -MS  BRIANNE  -MS  BRIANNE  -MV    Base  dressing in place, unable to visualize  -MS  dressing in place, unable to visualize  -MS  dressing in place, unable to visualize  -MV    Periwound  intact;dry  -MS  intact;dry  -MS  intact;dry  -MV    Retired Wound - Properties Group Date first assessed: 12/30/20  -AM Time first assessed: 1037  -AM Location: abdomen  -AM Primary Wound Type: Incision  -AM    Row Name 01/09/21 1200             Wound 12/30/20 1037 abdomen Incision    Wound - Properties Group Placement Date: 12/30/20  -AM Placement Time: 1037  -AM Location: abdomen  -AM Primary Wound Type: Incision  -AM    Closure  BRIANNE  -MV      Base  dressing in place, unable to visualize  -MV      Periwound  intact;dry  -MV      Retired Wound - Properties Group Date first assessed: 12/30/20  -AM Time first assessed: 1037  -AM Location: abdomen  -AM Primary Wound Type: Incision  -AM      User Key  (r) = Recorded By, (t) = Taken By, (c) = Cosigned By    Initials Name Provider Type    AM Ping Martinez, RN Registered Nurse    Daja Lopez RN Registered Nurse    Leonor Dang RN Registered Nurse          Procedures:    Procedure(s):  Laparoscopic hiatal hernia repair with gastropexy          Results Review:     I reviewed the patient's new clinical results.      Lab Results (last 24 hours)     Procedure Component Value Units Date/Time    Magnesium [[id number]]  (Abnormal) Collected: 01/10/21 0412    Specimen: Blood Updated: 01/10/21 0544     Magnesium 2.6 mg/dL     Basic Metabolic Panel [[id number]]  (Abnormal) Collected: 01/10/21 0412    Specimen: Blood Updated: 01/10/21 0541     Glucose 117 mg/dL      BUN 30 mg/dL       Creatinine 1.63 mg/dL      Sodium 135 mmol/L      Potassium 4.2 mmol/L      Chloride 93 mmol/L      CO2 30.0 mmol/L      Calcium 8.6 mg/dL      eGFR Non African Amer 30 mL/min/1.73      BUN/Creatinine Ratio 18.4     Anion Gap 12.0 mmol/L     Narrative:      GFR Normal >60  Chronic Kidney Disease <60  Kidney Failure <15      Basic Metabolic Panel [[id number]]  (Abnormal) Collected: 01/09/21 1048    Specimen: Blood Updated: 01/09/21 1210     Glucose 130 mg/dL      BUN 25 mg/dL      Creatinine 1.61 mg/dL      Sodium 136 mmol/L      Potassium 4.6 mmol/L      Comment: Result checked        Chloride 94 mmol/L      CO2 28.0 mmol/L      Calcium 9.3 mg/dL      eGFR Non African Amer 31 mL/min/1.73      BUN/Creatinine Ratio 15.5     Anion Gap 14.0 mmol/L     Narrative:      GFR Normal >60  Chronic Kidney Disease <60  Kidney Failure <15      Magnesium [[id number]]  (Normal) Collected: 01/09/21 1048    Specimen: Blood Updated: 01/09/21 1210     Magnesium 1.7 mg/dL      Comment: Result checked           No results found for: HGBA1C            No results found for: LIPASE  Lab Results   Component Value Date    CHOL 374 (H) 01/03/2020    TRIG 311 (H) 01/03/2020    HDL 50 01/03/2020     (H) 01/03/2020       Lab Results   Lab Value Date/Time    FINALDX  12/31/2020 0205     Leukocytosis with absolute atypical lymphocytosis  Anemia  No blasts identified  If CBC indices persist/worsen, consider flow cytometry or other studies as clinically indicated to exclude a lymphoproliferative disorder      FINALDX  02/06/2020 1405     Absolute atypical lymphocytosis.  Anemia.  No blasts identified.  Recommend clinical follow-up and additional studies to include flow cytometry as clinically indicated to exclude CLL/SLL or other lymphoproliferative disorder.         Microbiology Results (last 10 days)     ** No results found for the last 240 hours. 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 septal MI  Low voltage, precordial leads  When compared with ECG of 18-Dec-2020 14:29:46,  Poor R wave progression V1 to V3 noted  Electronically Signed By: Fausto Jean (PAVAN) 02-Jan-2021 15:05:56  Date and Time of Study: 2020-12-30 13:14:52    Adult Transthoracic Echo Complete W/ Cont if Necessary Per Protocol [[id number]] Collected: 01/06/21 1415     Updated: 01/06/21 1826     BSA 1.8 m^2      RVIDd 2.5 cm      IVSd 0.89 cm      LVIDd 5.1 cm      LVIDs 2.9 cm      LVPWd 0.93 cm      IVS/LVPW 0.96     FS 42.3 %      EDV(Teich) 122.8 ml      ESV(Teich) 33.1 ml      EF(Teich) 73.1 %      EDV(cubed) 131.2 ml      ESV(cubed) 25.2 ml      EF(cubed) 80.8 %      LV mass(C)d 164.9 grams      LV mass(C)dI 90.0 grams/m^2      SV(Teich) 89.7 ml      SI(Teich) 49.0 ml/m^2      SV(cubed) 106.1 ml      SI(cubed) 57.9 ml/m^2      Ao root diam 3.2 cm      Ao root area 8.0 cm^2      ACS 1.7 cm      asc Aorta Diam 2.3 cm      LVOT diam 2.0 cm      LVOT area 3.0 cm^2      RVOT diam 2.1 cm      RVOT area 3.3 cm^2      EDV(MOD-sp4) 71.6 ml      ESV(MOD-sp4) 18.7 ml      EF(MOD-sp4) 73.8 %      SV(MOD-sp4) 52.9 ml      SI(MOD-sp4) 28.9 ml/m^2      Ao root area (BSA corrected) 1.7     LV Leonard Vol (BSA corrected) 39.1 ml/m^2      LV Sys Vol (BSA corrected) 10.2 ml/m^2      Aortic R-R 0.72 sec      Aortic HR 83.4 BPM      MV E max mindy 148.1 cm/sec      MV A max mindy 117.7 cm/sec      MV E/A 1.3     MV V2 max 125.6 cm/sec      MV max PG 6.3 mmHg      MV V2 mean 87.9 cm/sec      MV mean PG 3.4 mmHg      MV V2 VTI 32.8 cm      MVA(VTI) 2.6 cm^2      MV dec slope 900.2 cm/sec^2       MV dec time 0.16 sec      Ao pk garry 162.4 cm/sec      Ao max PG 10.6 mmHg      Ao max PG (full) 4.9 mmHg      Ao V2 mean 115.5 cm/sec      Ao mean PG 6.0 mmHg      Ao mean PG (full) 2.7 mmHg      Ao V2 VTI 34.3 cm      EDILMA(I,A) 2.5 cm^2      EDILMA(I,D) 2.5 cm^2      EDILMA(V,A) 2.2 cm^2      EDILMA(V,D) 2.2 cm^2      AI max garry 441.0 cm/sec      AI max PG 77.8 mmHg      AI dec slope 379.7 cm/sec^2      AI dec time 1.2 sec      AI P1/2t 340.1 msec      LV V1 max PG 5.7 mmHg      LV V1 mean PG 3.2 mmHg      LV V1 max 119.1 cm/sec      LV V1 mean 84.0 cm/sec      LV V1 VTI 27.8 cm      MR max garry 519.2 cm/sec      MR max .8 mmHg      CO(Ao) 23.0 l/min      CI(Ao) 12.6 l/min/m^2      SV(Ao) 275.9 ml      SI(Ao) 150.6 ml/m^2      CO(LVOT) 7.0 l/min      CI(LVOT) 3.8 l/min/m^2      SV(LVOT) 84.1 ml      SV(RVOT) 62.9 ml      SI(LVOT) 45.9 ml/m^2      PA V2 max 90.0 cm/sec      PA max PG 3.2 mmHg      PA max PG (full) 0.43 mmHg      PA V2 mean 72.3 cm/sec      PA mean PG 2.2 mmHg      PA mean PG (full) 0.66 mmHg      PA V2 VTI 23.7 cm      PVA(I,A) 2.7 cm^2      BH CV ECHO KAREN - PVA(I,D) 2.7 cm^2      BH CV ECHO KAREN - PVA(V,A) 3.1 cm^2      BH CV ECHO KAREN - PVA(V,D) 3.1 cm^2      PA acc time 0.11 sec      PI end-d garry 49.7 cm/sec      PI max garry 236.6 cm/sec      PI max PG 22.4 mmHg      RV V1 max PG 2.8 mmHg      RV V1 mean PG 1.5 mmHg      RV V1 max 83.8 cm/sec      RV V1 mean 57.3 cm/sec      RV V1 VTI 19.0 cm      TR max garry 277.2 cm/sec      RVSP(TR) 33.7 mmHg      RAP systole 3.0 mmHg      PA pr(Accel) 27.6 mmHg      Pulm Sys Garry 66.4 cm/sec      Pulm Leonard Garry 43.5 cm/sec      Pulm S/D 1.5     Qp/Qs 0.75     Pulm A Revs Dur 0.09 sec      Pulm A Revs Garry 29.6 cm/sec       CV ECHO KAREN - BZI_BMI 33.1 kilograms/m^2       CV ECHO KAREN - BSA([facility]) 1.9 m^2       CV ECHO KAREN - BZI_METRIC_WEIGHT 82.1 kg       CV ECHO KAREN - BZI_METRIC_HEIGHT 157.5 cm      EF(MOD-bp) 74.0 %      LA dimension(2D) 3.9 cm       Echo EF Estimated 65 %     Narrative:      · Estimated left ventricular EF = 65% Estimated left ventricular EF was in   agreement with the calculated left ventricular EF. Left ventricular   systolic function is normal.  · There is mainly affecting the non-coronary and left coronary cusp(s).  · The right ventricular cavity is mildly dilated.  · Estimated right ventricular systolic pressure from tricuspid   regurgitation is normal (<35 mmHg).                  Results for orders placed during the hospital encounter of 12/30/20   Adult Transthoracic Echo Complete W/ Cont if Necessary Per Protocol    Narrative · Estimated left ventricular EF = 65% Estimated left ventricular EF was in   agreement with the calculated left ventricular EF. Left ventricular   systolic function is normal.  · There is mainly affecting the non-coronary and left coronary cusp(s).  · The right ventricular cavity is mildly dilated.  · Estimated right ventricular systolic pressure from tricuspid   regurgitation is normal (<35 mmHg).          Xr Chest 1 View    Result Date: 1/5/2021  Increased pulmonary edema from previous study. Persistent bibasilar airspace opacities atelectasis versus pneumonia. Small bilateral pleural effusions.  Electronically Signed By-Muriel Spencer MD On:1/5/2021 10:10 PM This report was finalized on [id number] by  Muriel Spencer MD.    Xr Chest 1 View    Result Date: 1/2/2021    1.  Low volume inspiration with persistent left lower lobe airspace consolidation which is unchanged. 2.  Small bilateral pleural effusions.   Electronically Signed By-Jan Vernon MD On:1/2/2021 11:39 AM This report was finalized on [id number] by  Jan Vernon MD.          Xrays, labs reviewed personally by physician.    Medication Review:   I have reviewed the patient's current medication list      Scheduled Meds  amLODIPine, 10 mg, Oral, Daily With Lunch  budesonide-formoterol, 2 puff, Inhalation, BID - RT  docusate sodium, 100 mg, Oral,  BID  guaiFENesin, 600 mg, Oral, Q12H  ipratropium-albuterol, 3 mL, Nebulization, Q4H - RT  levothyroxine, 100 mcg, Oral, QAM AC  methylnaltrexone, 6 mg, Subcutaneous, Every Other Day  metoclopramide, 10 mg, Intravenous, Q6H  mirtazapine, 7.5 mg, Oral, Nightly  pantoprazole, 40 mg, Oral, Q AM  polyethylene glycol, 17 g, Oral, Daily  rosuvastatin, 10 mg, Oral, Nightly  sodium chloride, 10 mL, Intravenous, Q12H        Meds Infusions  lactated ringers, 9 mL/hr, Last Rate: Stopped (12/30/20 2009)  phenylephrine, 0.5-3 mcg/kg/min  sodium chloride, 75 mL/hr        Meds PRN  •  acetaminophen **OR** acetaminophen  •  influenza vaccine  •  lactated ringers  •  magnesium sulfate **OR** magnesium sulfate **OR** magnesium sulfate  •  Morphine  •  [DISCONTINUED] HYDROmorphone **AND** naloxone  •  [DISCONTINUED] Morphine **AND** naloxone  •  ondansetron **OR** ondansetron  •  oxyCODONE  •  phenylephrine  •  potassium chloride **OR** potassium chloride **OR** potassium chloride  •  promethazine **OR** promethazine  •  risperiDONE  •  saline  •  sodium chloride        Assessment/Plan   Assessment/Plan     Active Hospital Problems    Diagnosis  POA   • **Hiatal hernia with gastroesophageal reflux [K21.9, K44.9]  Unknown   • Acute respiratory failure with hypoxia and hypercapnia (CMS/HCC) [J96.01, J96.02]  Unknown   • Abdominal pain [R10.9]  Unknown   • Nocturnal hypoxia [G47.34]  Yes   • LIBBY (obstructive sleep apnea) [G47.33]  Unknown      Resolved Hospital Problems   No resolved problems to display.       MEDICAL DECISION MAKING COMPLEXITY BY PROBLEM:     Acute on chronic hypoxic respiratory failure, LIBBY  LLL PNA  -likely realted to narcotics and LIBBY vs LLL CAP  -CXR 12/31/20: new LLL infiltrate  -CT chest reviewed b/l LL atelectasis vs infiltrate  -Significantly improved, off persistent flow, wean down to nasal cannula as tolerated  -compliant with AVAP  -Nebulizers, symbicort  -Finished Zosyn  -encourage aggressive pulmonary  toilet  -Repeat chest x-ray on 1/6 shows bilateral congestion and atelectasis  -Lasix stopped due to LEXUS  -Echo shows EF of 65% and no diastolic dysfunction  -pulmonary following  -Possibly okay to discharge tomorrow if weaned down to nasal cannula    LEXUS  -Likely from over diuresing  -Lasix stopped, started on IV fluids  -Monitor creatinine, avoid nephrotoxins    Hiatal hernia with GERD   Possible postoperative ileus, resolved  -S/p laparoscopic hiatal hernia repair with gastropexy  -General surgery primary postop per them  -PPI, carafate, reglan prn  -Did not tolerate GI soft well  -Diet and bowel regimen per surgery     Hypomagnesemia  -Replace, monitor daily    Chronic anxiety  -Risperidone as needed helps, dose increased  -Monitor     Hypertension  -Continue home meds  -Monitor and adjust blood pressure prn    CLL  -WBC chronically elevated,(baseline 15-20 range)  -Follow CBC while here    DVT prophylaxis  -Per primary      VTE Prophylaxis -   Mechanical Order History:      Ordered        12/30/20 1513  Place Sequential Compression Device  Once         12/30/20 1513  Place Sequential Compression Device  Once         12/30/20 1513  Maintain Sequential Compression Device  Continuous                 Pharmalogical Order History:     None            Code Status -   Code Status and Medical Interventions:   Ordered at: 12/30/20 1513     Code Status:    CPR     Medical Interventions (Level of Support Prior to Arrest):    Full       This patient has been examined wearing appropriate Personal Protective Equipment. 01/10/21        Discharge Planning  Possible discharge tomorrow if weaned down to nasal cannula        Electronically signed by Meg Lund DO, 01/10/21, 11:01 EST.  [facility] Hospitalist Team

## 2021-01-10 NOTE — PLAN OF CARE
Goal Outcome Evaluation:  Plan of Care Reviewed With: patient  Progress: improving  Outcome Summary: pt able to amb a little further today using rwx x6'. Still fatigues easily , congested. Refusing HH and supposed to dc home  when stable. GLoves,mask/goggls worn for tx

## 2021-01-11 ENCOUNTER — APPOINTMENT (OUTPATIENT)
Dept: GENERAL RADIOLOGY | Facility: HOSPITAL | Age: 81
End: 2021-01-11

## 2021-01-11 ENCOUNTER — APPOINTMENT (OUTPATIENT)
Dept: CARDIOLOGY | Facility: HOSPITAL | Age: 81
End: 2021-01-11

## 2021-01-11 LAB
ANION GAP SERPL CALCULATED.3IONS-SCNC: 10 MMOL/L (ref 5–15)
BH CV LOWER VASCULAR LEFT COMMON FEMORAL AUGMENT: NORMAL
BH CV LOWER VASCULAR LEFT COMMON FEMORAL COMPETENT: NORMAL
BH CV LOWER VASCULAR LEFT COMMON FEMORAL COMPRESS: NORMAL
BH CV LOWER VASCULAR LEFT COMMON FEMORAL PHASIC: NORMAL
BH CV LOWER VASCULAR LEFT COMMON FEMORAL SPONT: NORMAL
BH CV LOWER VASCULAR LEFT DISTAL FEMORAL COMPRESS: NORMAL
BH CV LOWER VASCULAR LEFT GASTRONEMIUS COMPRESS: NORMAL
BH CV LOWER VASCULAR LEFT GREATER SAPH AK COMPRESS: NORMAL
BH CV LOWER VASCULAR LEFT GREATER SAPH BK COMPRESS: NORMAL
BH CV LOWER VASCULAR LEFT LESSER SAPH COMPRESS: NORMAL
BH CV LOWER VASCULAR LEFT MID FEMORAL AUGMENT: NORMAL
BH CV LOWER VASCULAR LEFT MID FEMORAL COMPETENT: NORMAL
BH CV LOWER VASCULAR LEFT MID FEMORAL COMPRESS: NORMAL
BH CV LOWER VASCULAR LEFT MID FEMORAL PHASIC: NORMAL
BH CV LOWER VASCULAR LEFT MID FEMORAL SPONT: NORMAL
BH CV LOWER VASCULAR LEFT PERONEAL COMPRESS: NORMAL
BH CV LOWER VASCULAR LEFT POPLITEAL AUGMENT: NORMAL
BH CV LOWER VASCULAR LEFT POPLITEAL COMPETENT: NORMAL
BH CV LOWER VASCULAR LEFT POPLITEAL COMPRESS: NORMAL
BH CV LOWER VASCULAR LEFT POPLITEAL PHASIC: NORMAL
BH CV LOWER VASCULAR LEFT POPLITEAL SPONT: NORMAL
BH CV LOWER VASCULAR LEFT POSTERIOR TIBIAL COMPRESS: NORMAL
BH CV LOWER VASCULAR LEFT PROXIMAL FEMORAL COMPRESS: NORMAL
BH CV LOWER VASCULAR LEFT SAPHENOFEMORAL JUNCTION COMPRESS: NORMAL
BH CV LOWER VASCULAR RIGHT COMMON FEMORAL AUGMENT: NORMAL
BH CV LOWER VASCULAR RIGHT COMMON FEMORAL COMPETENT: NORMAL
BH CV LOWER VASCULAR RIGHT COMMON FEMORAL COMPRESS: NORMAL
BH CV LOWER VASCULAR RIGHT COMMON FEMORAL PHASIC: NORMAL
BH CV LOWER VASCULAR RIGHT COMMON FEMORAL SPONT: NORMAL
BH CV LOWER VASCULAR RIGHT DISTAL FEMORAL COMPRESS: NORMAL
BH CV LOWER VASCULAR RIGHT GASTRONEMIUS COMPRESS: NORMAL
BH CV LOWER VASCULAR RIGHT GREATER SAPH AK COMPRESS: NORMAL
BH CV LOWER VASCULAR RIGHT GREATER SAPH BK COMPRESS: NORMAL
BH CV LOWER VASCULAR RIGHT LESSER SAPH COMPRESS: NORMAL
BH CV LOWER VASCULAR RIGHT MID FEMORAL AUGMENT: NORMAL
BH CV LOWER VASCULAR RIGHT MID FEMORAL COMPETENT: NORMAL
BH CV LOWER VASCULAR RIGHT MID FEMORAL COMPRESS: NORMAL
BH CV LOWER VASCULAR RIGHT MID FEMORAL PHASIC: NORMAL
BH CV LOWER VASCULAR RIGHT MID FEMORAL SPONT: NORMAL
BH CV LOWER VASCULAR RIGHT PERONEAL COMPRESS: NORMAL
BH CV LOWER VASCULAR RIGHT POPLITEAL AUGMENT: NORMAL
BH CV LOWER VASCULAR RIGHT POPLITEAL COMPETENT: NORMAL
BH CV LOWER VASCULAR RIGHT POPLITEAL COMPRESS: NORMAL
BH CV LOWER VASCULAR RIGHT POPLITEAL PHASIC: NORMAL
BH CV LOWER VASCULAR RIGHT POPLITEAL SPONT: NORMAL
BH CV LOWER VASCULAR RIGHT POSTERIOR TIBIAL COMPRESS: NORMAL
BH CV LOWER VASCULAR RIGHT PROXIMAL FEMORAL COMPRESS: NORMAL
BH CV LOWER VASCULAR RIGHT SAPHENOFEMORAL JUNCTION COMPRESS: NORMAL
BUN SERPL-MCNC: 29 MG/DL (ref 8–23)
BUN/CREAT SERPL: 19.7 (ref 7–25)
CALCIUM SPEC-SCNC: 8.1 MG/DL (ref 8.6–10.5)
CHLORIDE SERPL-SCNC: 99 MMOL/L (ref 98–107)
CO2 SERPL-SCNC: 26 MMOL/L (ref 22–29)
CREAT SERPL-MCNC: 1.47 MG/DL (ref 0.57–1)
GFR SERPL CREATININE-BSD FRML MDRD: 34 ML/MIN/1.73
GLUCOSE SERPL-MCNC: 119 MG/DL (ref 65–99)
MAGNESIUM SERPL-MCNC: 2.1 MG/DL (ref 1.6–2.4)
POTASSIUM SERPL-SCNC: 4.6 MMOL/L (ref 3.5–5.2)
SODIUM SERPL-SCNC: 135 MMOL/L (ref 136–145)

## 2021-01-11 PROCEDURE — 80048 BASIC METABOLIC PNL TOTAL CA: CPT | Performed by: INTERNAL MEDICINE

## 2021-01-11 PROCEDURE — 94799 UNLISTED PULMONARY SVC/PX: CPT

## 2021-01-11 PROCEDURE — 71045 X-RAY EXAM CHEST 1 VIEW: CPT

## 2021-01-11 PROCEDURE — 93970 EXTREMITY STUDY: CPT

## 2021-01-11 PROCEDURE — 25010000002 METOCLOPRAMIDE PER 10 MG: Performed by: SURGERY

## 2021-01-11 PROCEDURE — 25010000002 ONDANSETRON PER 1 MG: Performed by: SURGERY

## 2021-01-11 PROCEDURE — 83735 ASSAY OF MAGNESIUM: CPT | Performed by: INTERNAL MEDICINE

## 2021-01-11 PROCEDURE — 97530 THERAPEUTIC ACTIVITIES: CPT

## 2021-01-11 PROCEDURE — 99233 SBSQ HOSP IP/OBS HIGH 50: CPT | Performed by: INTERNAL MEDICINE

## 2021-01-11 PROCEDURE — 97116 GAIT TRAINING THERAPY: CPT

## 2021-01-11 RX ADMIN — LEVOTHYROXINE SODIUM 100 MCG: 125 TABLET ORAL at 08:37

## 2021-01-11 RX ADMIN — OXYCODONE 5 MG: 5 TABLET ORAL at 20:38

## 2021-01-11 RX ADMIN — DOCUSATE SODIUM 100 MG: 100 CAPSULE, LIQUID FILLED ORAL at 20:38

## 2021-01-11 RX ADMIN — ROSUVASTATIN CALCIUM 10 MG: 10 TABLET, FILM COATED ORAL at 20:38

## 2021-01-11 RX ADMIN — ONDANSETRON 4 MG: 2 INJECTION INTRAMUSCULAR; INTRAVENOUS at 20:38

## 2021-01-11 RX ADMIN — GUAIFENESIN 600 MG: 600 TABLET, EXTENDED RELEASE ORAL at 08:37

## 2021-01-11 RX ADMIN — IPRATROPIUM BROMIDE AND ALBUTEROL SULFATE 3 ML: 2.5; .5 SOLUTION RESPIRATORY (INHALATION) at 19:15

## 2021-01-11 RX ADMIN — POLYETHYLENE GLYCOL 3350 17 G: 17 POWDER, FOR SOLUTION ORAL at 08:36

## 2021-01-11 RX ADMIN — METOCLOPRAMIDE HYDROCHLORIDE 10 MG: 5 INJECTION INTRAMUSCULAR; INTRAVENOUS at 11:54

## 2021-01-11 RX ADMIN — PANTOPRAZOLE SODIUM 40 MG: 40 TABLET, DELAYED RELEASE ORAL at 05:49

## 2021-01-11 RX ADMIN — METOCLOPRAMIDE HYDROCHLORIDE 10 MG: 5 INJECTION INTRAMUSCULAR; INTRAVENOUS at 05:49

## 2021-01-11 RX ADMIN — GUAIFENESIN 600 MG: 600 TABLET, EXTENDED RELEASE ORAL at 20:38

## 2021-01-11 RX ADMIN — SODIUM CHLORIDE 75 ML/HR: 9 INJECTION, SOLUTION INTRAVENOUS at 02:09

## 2021-01-11 RX ADMIN — OXYCODONE 5 MG: 5 TABLET ORAL at 11:58

## 2021-01-11 RX ADMIN — IPRATROPIUM BROMIDE AND ALBUTEROL SULFATE 3 ML: 2.5; .5 SOLUTION RESPIRATORY (INHALATION) at 06:50

## 2021-01-11 RX ADMIN — RISPERIDONE 0.5 MG: 0.5 TABLET, ORALLY DISINTEGRATING ORAL at 11:54

## 2021-01-11 RX ADMIN — MIRTAZAPINE 7.5 MG: 7.5 TABLET ORAL at 20:38

## 2021-01-11 RX ADMIN — RISPERIDONE 0.5 MG: 0.5 TABLET, ORALLY DISINTEGRATING ORAL at 20:39

## 2021-01-11 RX ADMIN — Medication 10 ML: at 20:39

## 2021-01-11 RX ADMIN — AMLODIPINE BESYLATE 10 MG: 5 TABLET ORAL at 11:54

## 2021-01-11 RX ADMIN — METOCLOPRAMIDE HYDROCHLORIDE 10 MG: 5 INJECTION INTRAMUSCULAR; INTRAVENOUS at 17:14

## 2021-01-11 RX ADMIN — METOCLOPRAMIDE HYDROCHLORIDE 10 MG: 5 INJECTION INTRAMUSCULAR; INTRAVENOUS at 00:00

## 2021-01-11 RX ADMIN — DOCUSATE SODIUM 100 MG: 100 CAPSULE, LIQUID FILLED ORAL at 08:37

## 2021-01-11 RX ADMIN — Medication 10 ML: at 08:37

## 2021-01-11 RX ADMIN — OXYCODONE 5 MG: 5 TABLET ORAL at 02:39

## 2021-01-11 NOTE — PROGRESS NOTES
Daily Progress Note    Hiatal hernia with gastroesophageal reflux    LIBBY (obstructive sleep apnea)    Nocturnal hypoxia    Acute respiratory failure with hypoxia and hypercapnia (CMS/HCC)    Abdominal pain    Assessment    Acute respiratory failure with hypoxia and hypercapnia patient still requiring oxygen 8 to 10 L  CXR reviewed, right basilar atelectasis, consolidation in left lung base  Patient has required higher amounts of oxygen and is refusing to wear AVAP due to claustraphobia     2D echo 1/6/2021 EF 65% RVSP less than 35    Hiatal hernia with gastroesophageal reflux  s/p abd sx repair of large hiatal hernia 12/30/20  Surgery following    Office note  Severe obstructive sleep apnea  home sleep study showed apnea-hypopnea index 34.5  Chronic hypoxic respiratory insufficiency  on oxygen 3 L.     Completed treatment for MAC,  significant improvement in oxygen and weight   Overnight oximetry showed significant nocturnal hypoxia   BAL February 2019 positive for MAC patient has bilateral lower lobe bronchiectasis chronic cough weight loss and worsening hypoxia ,  start treatment in March 2019  Large hiatal hernia with reflux, s/p repeat bronchoscopy  on 09/13/2019 negative MAC cultures ok to proceed with surgery for hernia surgery   History of aspiration secondary to vomiting      Breast cancer s/p 2-4 s/p Right simple mastectomy and Stitzer lymph node biopsy  CLL  Hypothyroidism  Hypertension  Diabetes  GERD  Hyperlipidemia  Osteoarthritis    Plan    Chest x-ray  Lower extremity Dopplers      Plan was discussed with patient, sister, hospitalist and nurse that the patient needs to be out of bed 3 times a day for at least an hour utilize IS     Continue following supportive measures;  Symbicort and duo nebs  Utilize morphine for pain to assist with breathing and does not need to be in sedative state  Zosyn  Gi prophylaxis protonix  DVT prophylaxis per surgery  Diet per surgery  PT  IS/flutter         LOS: 11 days        Subjective         Objective     Vital signs for last 24 hours:  Vitals:    01/11/21 0650 01/11/21 0653 01/11/21 1012 01/11/21 1105   BP:   140/61    BP Location:   Left arm    Patient Position:   Lying    Pulse: 76 77  93   Resp: 16 16 20    Temp:       TempSrc:       SpO2: 93% 90%  93%   Weight:       Height:           Intake/Output last 3 shifts:  I/O last 3 completed shifts:  In: 3432.3 [P.O.:1280; I.V.:2152.3]  Out: 50 [Urine:50]  Intake/Output this shift:  I/O this shift:  In: 240 [P.O.:240]  Out: -       Radiology  Imaging Results (Last 24 Hours)     ** No results found for the last 24 hours. **          Labs:  Results from last 7 days   Lab Units 01/06/21  0907   WBC 10*3/mm3 12.80*   HEMOGLOBIN g/dL 10.1*   HEMATOCRIT % 31.1*   PLATELETS 10*3/mm3 178     Results from last 7 days   Lab Units 01/11/21  0541   SODIUM mmol/L 135*   POTASSIUM mmol/L 4.6   CHLORIDE mmol/L 99   CO2 mmol/L 26.0   BUN mg/dL 29*   CREATININE mg/dL 1.47*   CALCIUM mg/dL 8.1*   GLUCOSE mg/dL 119*                     Results from last 7 days   Lab Units 01/11/21  0541   MAGNESIUM mg/dL 2.1                   Meds:   SCHEDULE  amLODIPine, 10 mg, Oral, Daily With Lunch  budesonide-formoterol, 2 puff, Inhalation, BID - RT  docusate sodium, 100 mg, Oral, BID  guaiFENesin, 600 mg, Oral, Q12H  ipratropium-albuterol, 3 mL, Nebulization, Q4H - RT  levothyroxine, 100 mcg, Oral, QAM AC  methylnaltrexone, 6 mg, Subcutaneous, Every Other Day  metoclopramide, 10 mg, Intravenous, Q6H  mirtazapine, 7.5 mg, Oral, Nightly  pantoprazole, 40 mg, Oral, Q AM  polyethylene glycol, 17 g, Oral, Daily  rosuvastatin, 10 mg, Oral, Nightly  sodium chloride, 10 mL, Intravenous, Q12H      Infusions  lactated ringers, 9 mL/hr, Last Rate: Stopped (12/30/20 2009)  phenylephrine, 0.5-3 mcg/kg/min  sodium chloride, 75 mL/hr, Last Rate: Stopped (01/11/21 1014)      PRNs  •  acetaminophen **OR** acetaminophen  •  influenza vaccine  •  lactated ringers  •  magnesium  sulfate **OR** magnesium sulfate **OR** magnesium sulfate  •  Morphine  •  [DISCONTINUED] HYDROmorphone **AND** naloxone  •  [DISCONTINUED] Morphine **AND** naloxone  •  ondansetron **OR** ondansetron  •  oxyCODONE  •  phenylephrine  •  potassium chloride **OR** potassium chloride **OR** potassium chloride  •  promethazine **OR** promethazine  •  risperiDONE  •  saline  •  sodium chloride    Physical Exam:  Physical Exam  Vitals signs reviewed.   Pulmonary:      Breath sounds: Wheezing and rhonchi present.   Abdominal:      General: There is distension.      Tenderness: There is abdominal tenderness.   Musculoskeletal:         General: Swelling present.   Skin:     General: Skin is warm and dry.   Neurological:      Mental Status: She is alert and oriented to person, place, and time.         ROS  Review of Systems   Constitutional: Positive for activity change and fatigue.   Respiratory: Positive for shortness of breath.    Gastrointestinal: Positive for abdominal distention and abdominal pain.

## 2021-01-11 NOTE — PROGRESS NOTES
Nutrition Services    Patient Name: Diane Guan  YOB: 1940  MRN: [id number]  Admission date: 12/30/2020    PPE Documentation        PPE Worn By Provider N/A, assessed remotely   PPE Worn By Patient  N/A     PROGRESS NOTE      Encounter Information: 1/11: Check on for diet advancement. Noted diet advanced on 1/8 to Soft to Chew. Chart reviewed for information as patient not available for interview at time of assessment.       PO Diet: Diet Texture; Soft to Chew   PO Supplements: -Boost Plus TID   PO Intake:  -69% last 4 meals       Nutrition support orders: -N/A    Nutrition support review: -       Labs (reviewed below): -Hyponatremia, stable  -BUN/creat elev, improving         GI Function:  -last BM 1/8, 3 days if no BM today- daily bowel meds currently in place       Nutrition Intervention: Continue Soft to Chew diet with encouragement of Boost Plus supplement.       Results from last 7 days   Lab Units 01/11/21  0541 01/10/21  0412 01/09/21  1048   SODIUM mmol/L 135* 135* 136   POTASSIUM mmol/L 4.6 4.2 4.6   CHLORIDE mmol/L 99 93* 94*   CO2 mmol/L 26.0 30.0* 28.0   BUN mg/dL 29* 30* 25*   CREATININE mg/dL 1.47* 1.63* 1.61*   CALCIUM mg/dL 8.1* 8.6 9.3   GLUCOSE mg/dL 119* 117* 130*     Results from last 7 days   Lab Units 01/11/21  0541 01/10/21  0412 01/09/21  1048  01/06/21  0907   MAGNESIUM mg/dL 2.1 2.6* 1.7   < > 1.3*   HEMOGLOBIN g/dL  --   --   --   --  10.1*   HEMATOCRIT %  --   --   --   --  31.1*    < > = values in this interval not displayed.     COVID19   Date Value Ref Range Status   12/28/2020 Not Detected Not Detected - Ref. Range Final     Lab Results   Component Value Date    HGBA1C 6.1 (H) 08/18/2020       RD to follow up per protocol.    Electronically signed by:  Kaitlynn Parish RD  01/11/21 13:34 EST

## 2021-01-11 NOTE — PLAN OF CARE
Goal Outcome Evaluation:  Plan of Care Reviewed With: patient  Progress: improving  Outcome Summary: Patient very anxious today and tearful at times. Patient states she just wants to go home and does not understand why she cant go home on a high amount of oxygen. Patient currently on 11L high flow oxygen and making progress to wean down. Chest xray and dopplers done today. Vitals stable at this time, will continue to monitor.

## 2021-01-11 NOTE — PROGRESS NOTES
Continued Stay Note   [facility]     Patient Name: Diane Guan  MRN: [id number]  Today's Date: 1/11/2021    Admit Date: 12/30/2020    Discharge Plan     Row Name 01/11/21 1143       Plan    Plan  D/C Plan : Home with spouse . Pt is refusing rehab and H/H. Pt has home o2 2l .    Plan Comments  Barrier to D/C : Pt is POD 11 of a hernia repair and remains on 15l of o2 . I spoke with Dr. Frankel and he will get a new CXR and address her high need of o2 .        Discharge Codes    No documentation.       Expected Discharge Date and Time     Expected Discharge Date Expected Discharge Time    Jan 11, 2021             Breana Sterling RN

## 2021-01-11 NOTE — THERAPY TREATMENT NOTE
Patient Name: Diane Guan  : 1940    MRN: [id number]                              Today's Date: 2021       Admit Date: 2020    Visit Dx:     ICD-10-CM ICD-9-CM   1. Hiatal hernia with gastroesophageal reflux  K21.9 530.81    K44.9 553.3     Patient Active Problem List   Diagnosis   • Abnormality of gait   • Mixed anxiety and depressive disorder   • Primary osteoarthritis involving multiple joints   • Breast cancer (CMS/[facility])   • Chronic lymphoid leukemia (CMS/[facility])   • Depression   • Gallbladder disorder   • Hiatal hernia with gastroesophageal reflux   • Mixed hyperlipidemia   • Essential hypertension   • Acquired hypothyroidism   • Type 2 diabetes mellitus without complication, without long-term current use of insulin (CMS/[facility])   • Insomnia   • Postmenopausal status   • Shoulder pain   • Overactive bladder   • Vitamin B 12 deficiency   • Seasonal allergies   • Absolute anemia   • Vitamin D deficiency   • LIBBY (obstructive sleep apnea)   • Nocturnal hypoxia   • Acute respiratory failure with hypoxia and hypercapnia (CMS/[facility])   • Abdominal pain     Past Medical History:   Diagnosis Date   • Acute bronchitis due to human metapneumovirus 2020   • Anxiety disorder    • Arthritis    • Breast cancer (CMS/[facility]) 2019    Invasive ductal carcinoma   • Chronic lymphocytic leukemia (CLL), B-cell (CMS/[facility]) 2019   • Depression    • Disease of thyroid gland    • Diverticulosis of colon 2018    Identified on colonoscopy   • Dysphagia 2020   • Dyspnea on exertion    • Hemorrhoid    • Hiatal hernia 2020   • Hiatal hernia with GERD     large hiatal hernia with high-grade reflux on barium swallow; s/p laparoscopic fundoplication with gastropexy   • History of diabetes mellitus     no meds now   • Hyperlipidemia    • Hypertension    • Mycobacterium avium complex (CMS/[facility]) 2019    aspiration pneumonia; completed abx therapy   • OAB (overactive bladder)    • Sleep apnea     daughter states pt does  not have and doesn't have machine     Past Surgical History:   Procedure Laterality Date   • BLADDER SURGERY     • BRONCHOSCOPY N/A 9/13/2019    Procedure: BRONCHOSCOPY with bronchial washing;  Surgeon: Marnie Frankel MD;  Location: [facility] ENDOSCOPY;  Service: Pulmonary   • COLONOSCOPY  07/19/2018    severe diverticulosis   • CYST REMOVAL      Removed a fatty cyst off of her back   • ENDOSCOPY N/A 11/23/2020    Procedure: ESOPHAGOGASTRODUODENOSCOPY with dilatation (Bougie # 48, 50, 52, 54, 56, 58);  Surgeon: Den Plummer DO;  Location: [facility] ENDOSCOPY;  Service: General;  Laterality: N/A;  Post: large hiatal hernia, joshua's ulcers   • HIATAL HERNIA REPAIR N/A 12/30/2020    Procedure: Laparoscopic hiatal hernia repair with gastropexy;  Surgeon: Den Plummer DO;  Location: [facility] MAIN OR;  Service: General;  Laterality: N/A;   • MASTECTOMY Right 02/04/2019    Invasive ductal carcinoma   • TUBAL ABDOMINAL LIGATION       General Information     Row Name 01/11/21 1738          Physical Therapy Time and Intention    Document Type  therapy note (daily note)  -SC     Mode of Treatment  physical therapy;individual therapy  -SC     Row Name 01/11/21 1738          General Information    Existing Precautions/Restrictions  fall;oxygen therapy device and L/min  -SC     Row Name 01/11/21 1738          Cognition    Orientation Status (Cognition)  oriented x 3  -SC     Row Name 01/11/21 1738          Safety Issues, Functional Mobility    Impairments Affecting Function (Mobility)  balance;endurance/activity tolerance;strength;pain  -SC       User Key  (r) = Recorded By, (t) = Taken By, (c) = Cosigned By    Initials Name Provider Type    SC Mya Lawton PTA Physical Therapy Assistant        Mobility     Row Name 01/11/21 1747          Bed Mobility    Supine-Sit [place] (Bed Mobility)  minimum assist (75% patient effort);1 person assist;verbal cues  -SC     Sit-Supine [place] (Bed Mobility)  minimum assist (75%  patient effort);verbal cues  -SC     Assistive Device (Bed Mobility)  bed rails  -SC     Comment (Bed Mobility)  log roll  -SC     Row Name 01/11/21 1740          Transfers    Comment (Transfers)  sit to stand 4 times from eob during tx session  -SC     Row Name 01/11/21 1740          Sit-Stand Transfer    Sit-Stand [place] (Transfers)  contact guard  -SC     Assistive Device (Sit-Stand Transfers)  walker, front-wheeled  -SC     Row Name 01/11/21 1740          Gait/Stairs (Locomotion)    [place] Level (Gait)  contact guard  -SC     Assistive Device (Gait)  walker, front-wheeled  -SC     Distance in Feet (Gait)  34', three times.  pt needed to rest at eob after each walk  -SC     Deviations/Abnormal Patterns (Gait)  jey decreased;stride length decreased;weight shifting decreased  -SC     Bilateral Gait Deviations  forward flexed posture  -SC     Comment (Gait/Stairs)  cues for posture,  very short step length  -SC       User Key  (r) = Recorded By, (t) = Taken By, (c) = Cosigned By    Initials Name Provider Type    Mya Keating, VASHTI Physical Therapy Assistant        Obj/Interventions     Row Name 01/11/21 1743          Balance    Balance Assessment  sitting static balance;sitting dynamic balance;standing static balance;standing dynamic balance  -SC     Static Sitting Balance  WFL;sitting, edge of bed  -SC     Dynamic Sitting Balance  WFL;sitting, edge of bed  -SC     Static Standing Balance  mild impairment;supported with roll walker  -SC     Dynamic Standing Balance  mild impairment;supported with roll walker  -SC       User Key  (r) = Recorded By, (t) = Taken By, (c) = Cosigned By    Initials Name Provider Type    Mya Keating PTA Physical Therapy Assistant        Goals/Plan    No documentation.       Clinical Impression     Row Name 01/11/21 1744          Pain Scale: Numbers Pre/Post-Treatment    Pain Intervention(s)  Rest;Emotional support;Repositioned  -SC     Row Name 01/11/21  1744          Pain Scale: FACES Pre/Post-Treatment    Pain: FACES Scale, Pretreatment  0-->no hurt  -SC     Posttreatment Pain Rating  4-->hurts little more  -SC     Pain Location - Side  Right  -SC     Pain Location  knee  -SC     Pre/Posttreatment Pain Comment  arthritic pain  -SC     Row Name 01/11/21 1744          Therapy Assessment/Plan (PT)    Rehab Potential (PT)  good, to achieve stated therapy goals  -SC     Criteria for Skilled Interventions Met (PT)  skilled treatment is necessary  -SC     Row Name 01/11/21 1744          Vital Signs    Pre SpO2 (%)  92  -SC     O2 Delivery Pre Treatment  hi-flow 8L  -SC     Intra SpO2 (%)  97  -SC     O2 Delivery Intra Treatment  hi-flow 8L  -SC     Post SpO2 (%)  95  -SC     O2 Delivery Post Treatment  hi-flow 8L  -SC     Row Name 01/11/21 1744          Positioning and Restraints    Pre-Treatment Position  in bed  -SC     Post Treatment Position  bed  -SC     In Bed  notified nsg;side lying left;call light within reach;with family/caregiver  -SC       User Key  (r) = Recorded By, (t) = Taken By, (c) = Cosigned By    Initials Name Provider Type    SC Mya Lawton, VASHTI Physical Therapy Assistant        Outcome Measures     Row Name 01/11/21 1746          How much help from another person do you currently need...    Turning from your back to your side while in flat bed without using bedrails?  3  -SC     Moving from lying on back to sitting on the side of a flat bed without bedrails?  3  -SC     Moving to and from a bed to a chair (including a wheelchair)?  3  -SC     Standing up from a chair using your arms (e.g., wheelchair, bedside chair)?  3  -SC     Climbing 3-5 steps with a railing?  2  -SC     To walk in hospital room?  3  -SC     AM-PAC 6 Clicks Score (PT)  17  -SC     Row Name 01/11/21 1746          Functional Assessment    Outcome Measure Options  AM-PAC 6 Clicks Basic Mobility (PT)  -SC       User Key  (r) = Recorded By, (t) = Taken By, (c) = Cosigned By     Initials Name Provider Type    SC Mya Lawton, VASHTI Physical Therapy Assistant        Physical Therapy Education                 Title: PT OT SLP Therapies (Done)     Topic: Physical Therapy (Done)     Point: Mobility training (Done)     Learning Progress Summary           Patient Acceptance, E,TB, VU by SC at 1/11/2021 1747    Acceptance, E,TB, VU by  at 1/10/2021 1617    Acceptance, E, DU,NR by  at 1/8/2021 1543    Comment: Reinforced safe hand placement c rwx. use and transfers.    Acceptance, E, VU by  at 1/8/2021 0312    Acceptance, D,E, DU,NR by  at 1/7/2021 1604    Comment: Reinforced safe hand placement c rwx. use and transfers.    Acceptance, E,TB, VU by  at 1/5/2021 1325    Acceptance, E,TB, VU by SC at 1/4/2021 1905    Acceptance, E,TB, VU by  at 1/3/2021 1712    Acceptance, E,TB, VU by MI at 12/31/2020 1121   Family Acceptance, E,TB, VU by SC at 1/11/2021 1747    Acceptance, E, VU by  at 1/8/2021 0312                   Point: Precautions (Done)     Learning Progress Summary           Patient Acceptance, E,TB, VU by SC at 1/11/2021 1747    Acceptance, E,TB, VU by  at 1/10/2021 1617    Acceptance, E, DU,NR by  at 1/8/2021 1543    Comment: Reinforced safe hand placement c rwx. use and transfers.    Acceptance, E, VU by  at 1/8/2021 0312    Acceptance, D,E, DU,NR by  at 1/7/2021 1604    Comment: Reinforced safe hand placement c rwx. use and transfers.    Acceptance, E,TB, VU by  at 1/5/2021 1325    Acceptance, E,TB, VU by SC at 1/4/2021 1905    Acceptance, E,TB, VU by MI at 12/31/2020 1121   Family Acceptance, E,TB, VU by SC at 1/11/2021 1747    Acceptance, E, VU by  at 1/8/2021 0312                               User Key     Initials Effective Dates Name Provider Type [facility] 03/01/19 -  Juan Ramon, Kinga, PT Physical Therapist PT    SC 03/01/19 -  Mya Lawton, PTA Physical Therapy Assistant PT     03/01/19 -  Florence Aragon, VASHTI Physical Therapy Assistant PT      03/01/19 -  Jess Marion PTA Physical Therapy Assistant PT    EL 06/23/20 -  Chencho Rojas PT Physical Therapist PT    JS 06/19/20 -  Anjum Ortez, RN Registered Nurse Nurse    MI 03/01/19 -  Ubaldo Elder, RN Registered Nurse Nurse              PT Recommendation and Plan     Plan of Care Reviewed With: patient, sibling  Progress: improving  Outcome Summary: pt with good participation today.  pt mau short distance gait training with sats within normal limits.  pt on 8L HF nc.  pt still with weakness.  D/C plans are for pt to return home at d/c with assist from family.  Recommend HHPT.  PPE used:  mask, safety glasses and gloves     Time Calculation:   PT Charges     Row Name 01/11/21 1749             Time Calculation    Start Time  1555  -SC      Stop Time  1624  -SC      Time Calculation (min)  29 min  -SC      PT Received On  01/11/21  -SC      PT - Next Appointment  01/12/21  -SC         Time Calculation- PT    Total Timed Code Minutes- PT  29 minute(s)  -SC         Timed Charges    63320 - Gait Training Minutes   14  -SC      39086 - PT Therapeutic Activity Minutes  15  -SC        User Key  (r) = Recorded By, (t) = Taken By, (c) = Cosigned By    Initials Name Provider Type    SC Mya Lawton PTA Physical Therapy Assistant        Therapy Charges for Today     Code Description Service Date Service Provider Modifiers Qty    92372172419 HC GAIT TRAINING EA 15 MIN 1/11/2021 Mya Lawton, PTA GP 1    84526170564 HC PT THERAPEUTIC ACT EA 15 MIN 1/11/2021 Mya Lawton PTA GP 1          PT G-Codes  Outcome Measure Options: AM-PAC 6 Clicks Basic Mobility (PT)  AM-PAC 6 Clicks Score (PT): 17    Mya Lawton PTA  1/11/2021

## 2021-01-11 NOTE — PLAN OF CARE
Goal Outcome Evaluation:  Plan of Care Reviewed With: patient, sibling  Progress: improving  Outcome Summary: pt with good participation today.  pt mau short distance gait training with sats within normal limits.  pt on 8L HF nc.  pt still with weakness.  D/C plans are for pt to return home at d/c with assist from family.  Recommend HHPT.  PPE used:  mask, safety glasses and gloves

## 2021-01-11 NOTE — PROGRESS NOTES
[facility] Daily Progress Note      Hospitalist Team  LOS 11 days      Patient Care Team:  Jemima Fontaine MD as PCP - General (Family Medicine)    Patient Location: 2105/1      Subjective   Subjective     Chief Complaint / Subjective  No chief complaint on file.  follow up shortness of breath       Brief Synopsis of Hospital Course/HPI     80-year-old female who was having issues with GERD was found to have hiatal hernia and is underwent fixation per general surgery.  She has been admitted postoperatively medicine has been consulted to help with her medical problems.  Her blood pressure stable she is having some apnea spells from the sedation but overall her breathing is stable.  No other issues.  As reported she is slightly sedated from the procedure and HPI was slightly limited.         Date::    12/31/20: had worsening respiratory status last night and has been on HFNC this am. Refusing to wear BIPAP although likely needs it fo LIBBY.    1/1/20: on BIPAP this am and thus HPI limited. Events of overnight noted was moved to higher level of care. O2 is stable today. Seems O2 worse when not deep breathing and when sleeping as has LIBBY.     1/2/21: no real improvement. Having issues with pain medication and sedation not allowing pulmonary toilet. Will not wear BIPAP    1/3/21: Slight improvement today.  Per the nurse she had been using her incentive spirometer and been more ambulatory.  Encourage patient with pulmonology at bedside to continue to increase activity    1/4/2021: Patient seen and examined this morning.  Remains short of breath and on high flow.  Encouraged to ambulate more and use examination amatory, patient agrees.  No other complaints or acute events overnight per nursing.    1/5/2021: Patient seen this morning.  Still the same, shortness of breath the same.  Remains on high flow.  No other complaints.    1/6/2021: Patient seen and examined this morning.   Feeling slightly better but not significantly improved.  Remains on high flow.  Chest x-ray noted to have bilateral congestion, started on Lasix, echo ordered.    1/7/2021: Patient seen examined this morning.  Feeling a little anxious today but shortness of breath has improved.  Oxygen requirement improving.  No acute events overnight per nursing.     1/8/2021: Patient seen and examined this morning.  Patient feels significant improvement today, weaned off with precision flow and now on high flow at 12 L now.  Only complaint  She wants to eat some solid food and not be on liquid diet anymore.  Discussed with nursing to check with surgery to advance diet if possible today.    1/9/2021: Patient seen and examined this morning.  Having some coughing episode and nausea today after trying some GI soft diet with eggs and hash brown.  Wants to go back on liquid diet, will notify surgery.  Patient still having intermittent anxiety, risperidone as needed helping but not lasting enough, dose increased.  Remains on high flow, wean off as tolerated, discussed with nursing.  Discussed with family at bedside, all questions answered.    1/10/2021: Patient seen examined this morning.  Feeling much better today, remains on high flow, discussed with nursing to wean off nasal cannula today.  Still LEXUS, IVF restarted.  Plan for discharge once weaned down to nasal cannula home requirement.    Denies fever, chills, chest pain, nausea, vomiting, diarrhea, dysuria, or dizziness.    1/11/2021- Patient had to be increased back to 15L high flow overnight. Patient is anxious to leave.       Review of Systems   Cardiovascular: Negative for chest pain and palpitations.   Respiratory: Positive for shortness of breath. Negative for cough.    Gastrointestinal: Negative for abdominal pain, nausea and vomiting.         Objective   Objective      Vital Signs  Temp:  [97.9 °F (36.6 °C)-98.6 °F (37 °C)] 98.6 °F (37 °C)  Heart Rate:  [76-95] 90  Resp:   "[16-20] 18  BP: (139-151)/(52-65) 151/65  Oxygen Therapy  SpO2: 90 %  Pulse Oximetry Type: Continuous  Device (Oxygen Therapy): humidified, high-flow nasal cannula  $ High Flow Nasal Cannula Set-Up: yes  Flow (L/min): 10  Oxygen Concentration (%): 80  Oximetry Probe Site Changed: Yes  Flowsheet Rows      First Filed Value   Admission Height  157.5 cm (62\") Documented at 12/16/2020 1444   Admission Weight  81.6 kg (180 lb) Documented at 12/16/2020 1444        Intake & Output (last 3 days)       01/08 0701 - 01/09 0700 01/09 0701 - 01/10 0700 01/10 0701 - 01/11 0700 01/11 0701 - 01/12 0700    P.O.  240    I.V. (mL/kg)   2152.3 (26.3)     Total Intake(mL/kg) 600 (7.3) 560 (6.8) 3232.3 (39.5) 240 (2.9)    Urine (mL/kg/hr) 600 (0.3) 500 (0.3) 50 (0)     Stool   0     Total Output 600 500 50     Net 0 +60 +3182.3 +240            Urine Unmeasured Occurrence   1 x         Lines, Drains & Airways    Active LDAs     Name:   Placement date:   Placement time:   Site:   Days:    Peripheral IV 12/31/20 0938 Anterior;Left Forearm   12/31/20    0938    Forearm   less than 1                Physical Exam  Vitals signs reviewed.   Constitutional:       General: She is not in acute distress.     Appearance: She is obese.   HENT:      Head: Normocephalic and atraumatic.   Cardiovascular:      Rate and Rhythm: Normal rate and regular rhythm.   Pulmonary:      Comments: Decreased breath sounds throughout  Skin:     General: Skin is warm and dry.      Findings: No rash.   Neurological:      Mental Status: She is alert.   Psychiatric:         Mood and Affect: Mood normal.         Behavior: Behavior normal.               Wounds (last 24 hours)      LDA Wound     Row Name 01/11/21 1200 01/11/21 0800 01/11/21 0400       Wound 12/30/20 1037 abdomen Incision    Wound - Properties Group Placement Date: 12/30/20  -AM Placement Time: 1037  -AM Location: abdomen  -AM Primary Wound Type: Incision  -AM    Dressing Appearance  --  no " drainage;dry;intact  -BC  --    Closure  BRIANNE  -BC  BRIANNE  -BC  BRIANNE  -CC    Base  dressing in place, unable to visualize  -BC  dressing in place, unable to visualize  -BC  dressing in place, unable to visualize  -CC    Periwound  intact;dry  -BC  intact;dry  -BC  intact;dry  -CC    Drainage Amount  none  -BC  none  -BC  none  -CC    Retired Wound - Properties Group Date first assessed: 12/30/20  -AM Time first assessed: 1037  -AM Location: abdomen  -AM Primary Wound Type: Incision  -AM    Row Name 01/11/21 0000 01/10/21 2002          Wound 12/30/20 1037 abdomen Incision    Wound - Properties Group Placement Date: 12/30/20  -AM Placement Time: 1037  -AM Location: abdomen  -AM Primary Wound Type: Incision  -AM    Closure  BRIANNE  -CC  BRIANNE  -CC     Base  dressing in place, unable to visualize  -CC  dressing in place, unable to visualize  -CC     Periwound  intact;dry  -CC  intact;dry  -CC     Drainage Amount  none  -CC  none  -CC     Retired Wound - Properties Group Date first assessed: 12/30/20  -AM Time first assessed: 1037  -AM Location: abdomen  -AM Primary Wound Type: Incision  -AM      User Key  (r) = Recorded By, (t) = Taken By, (c) = Cosigned By    Initials Name Provider Type    AM Ping Martinez, RN Registered Nurse    Anni Lopez RN Registered Nurse    Ai Botello, RN Registered Nurse          Procedures:    Procedure(s):  Laparoscopic hiatal hernia repair with gastropexy          Results Review:     I reviewed the patient's new clinical results.      Lab Results (last 24 hours)     Procedure Component Value Units Date/Time    Basic Metabolic Panel [[id number]]  (Abnormal) Collected: 01/11/21 0541    Specimen: Blood Updated: 01/11/21 0622     Glucose 119 mg/dL      BUN 29 mg/dL      Creatinine 1.47 mg/dL      Sodium 135 mmol/L      Potassium 4.6 mmol/L      Comment: Slight hemolysis detected by analyzer. Results may be affected.        Chloride 99 mmol/L      CO2 26.0 mmol/L      Calcium 8.1  mg/dL      eGFR Non African Amer 34 mL/min/1.73      BUN/Creatinine Ratio 19.7     Anion Gap 10.0 mmol/L     Narrative:      GFR Normal >60  Chronic Kidney Disease <60  Kidney Failure <15      Magnesium [[id number]]  (Normal) Collected: 01/11/21 0541    Specimen: Blood Updated: 01/11/21 0622     Magnesium 2.1 mg/dL         No results found for: HGBA1C            No results found for: LIPASE  Lab Results   Component Value Date    CHOL 374 (H) 01/03/2020    TRIG 311 (H) 01/03/2020    HDL 50 01/03/2020     (H) 01/03/2020       Lab Results   Lab Value Date/Time    FINALDX  12/31/2020 0205     Leukocytosis with absolute atypical lymphocytosis  Anemia  No blasts identified  If CBC indices persist/worsen, consider flow cytometry or other studies as clinically indicated to exclude a lymphoproliferative disorder      FINALDX  02/06/2020 1405     Absolute atypical lymphocytosis.  Anemia.  No blasts identified.  Recommend clinical follow-up and additional studies to include flow cytometry as clinically indicated to exclude CLL/SLL or other lymphoproliferative disorder.         Microbiology Results (last 10 days)     ** No results found for the last 240 hours. 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 septal MI  Low voltage, precordial leads  When compared with ECG of 18-Dec-2020 14:29:46,  Poor R wave progression V1 to V3 noted  Electronically Signed By: Fausto Jean) 02-Jan-2021 15:05:56  Date and Time of Study: 2020-12-30 13:14:52    Adult Transthoracic Echo Complete W/ Cont if Necessary Per  Protocol [[id number]] Collected: 01/06/21 1415     Updated: 01/06/21 1826     BSA 1.8 m^2      RVIDd 2.5 cm      IVSd 0.89 cm      LVIDd 5.1 cm      LVIDs 2.9 cm      LVPWd 0.93 cm      IVS/LVPW 0.96     FS 42.3 %      EDV(Teich) 122.8 ml      ESV(Teich) 33.1 ml      EF(Teich) 73.1 %      EDV(cubed) 131.2 ml      ESV(cubed) 25.2 ml      EF(cubed) 80.8 %      LV mass(C)d 164.9 grams      LV mass(C)dI 90.0 grams/m^2      SV(Teich) 89.7 ml      SI(Teich) 49.0 ml/m^2      SV(cubed) 106.1 ml      SI(cubed) 57.9 ml/m^2      Ao root diam 3.2 cm      Ao root area 8.0 cm^2      ACS 1.7 cm      asc Aorta Diam 2.3 cm      LVOT diam 2.0 cm      LVOT area 3.0 cm^2      RVOT diam 2.1 cm      RVOT area 3.3 cm^2      EDV(MOD-sp4) 71.6 ml      ESV(MOD-sp4) 18.7 ml      EF(MOD-sp4) 73.8 %      SV(MOD-sp4) 52.9 ml      SI(MOD-sp4) 28.9 ml/m^2      Ao root area (BSA corrected) 1.7     LV Leonard Vol (BSA corrected) 39.1 ml/m^2      LV Sys Vol (BSA corrected) 10.2 ml/m^2      Aortic R-R 0.72 sec      Aortic HR 83.4 BPM      MV E max mindy 148.1 cm/sec      MV A max mindy 117.7 cm/sec      MV E/A 1.3     MV V2 max 125.6 cm/sec      MV max PG 6.3 mmHg      MV V2 mean 87.9 cm/sec      MV mean PG 3.4 mmHg      MV V2 VTI 32.8 cm      MVA(VTI) 2.6 cm^2      MV dec slope 900.2 cm/sec^2      MV dec time 0.16 sec      Ao pk mindy 162.4 cm/sec      Ao max PG 10.6 mmHg      Ao max PG (full) 4.9 mmHg      Ao V2 mean 115.5 cm/sec      Ao mean PG 6.0 mmHg      Ao mean PG (full) 2.7 mmHg      Ao V2 VTI 34.3 cm      EDILMA(I,A) 2.5 cm^2      EDILMA(I,D) 2.5 cm^2      EDILMA(V,A) 2.2 cm^2      EDILMA(V,D) 2.2 cm^2      AI max mindy 441.0 cm/sec      AI max PG 77.8 mmHg      AI dec slope 379.7 cm/sec^2      AI dec time 1.2 sec      AI P1/2t 340.1 msec      LV V1 max PG 5.7 mmHg      LV V1 mean PG 3.2 mmHg      LV V1 max 119.1 cm/sec      LV V1 mean 84.0 cm/sec      LV V1 VTI 27.8 cm      MR max mindy 519.2 cm/sec      MR max .8 mmHg      CO(Ao) 23.0 l/min      CI(Ao) 12.6  l/min/m^2      SV(Ao) 275.9 ml      SI(Ao) 150.6 ml/m^2      CO(LVOT) 7.0 l/min      CI(LVOT) 3.8 l/min/m^2      SV(LVOT) 84.1 ml      SV(RVOT) 62.9 ml      SI(LVOT) 45.9 ml/m^2      PA V2 max 90.0 cm/sec      PA max PG 3.2 mmHg      PA max PG (full) 0.43 mmHg      PA V2 mean 72.3 cm/sec      PA mean PG 2.2 mmHg      PA mean PG (full) 0.66 mmHg      PA V2 VTI 23.7 cm      PVA(I,A) 2.7 cm^2       CV ECHO KAREN - PVA(I,D) 2.7 cm^2       CV ECHO KAREN - PVA(V,A) 3.1 cm^2       CV ECHO KAREN - PVA(V,D) 3.1 cm^2      PA acc time 0.11 sec      PI end-d garry 49.7 cm/sec      PI max garry 236.6 cm/sec      PI max PG 22.4 mmHg      RV V1 max PG 2.8 mmHg      RV V1 mean PG 1.5 mmHg      RV V1 max 83.8 cm/sec      RV V1 mean 57.3 cm/sec      RV V1 VTI 19.0 cm      TR max garry 277.2 cm/sec      RVSP(TR) 33.7 mmHg      RAP systole 3.0 mmHg      PA pr(Accel) 27.6 mmHg      Pulm Sys Garry 66.4 cm/sec      Pulm Leonard Garry 43.5 cm/sec      Pulm S/D 1.5     Qp/Qs 0.75     Pulm A Revs Dur 0.09 sec      Pulm A Revs Garry 29.6 cm/sec       CV ECHO KAREN - BZI_BMI 33.1 kilograms/m^2       CV ECHO KAREN - BSA(HAYCOCK) 1.9 m^2       CV ECHO KAREN - BZI_METRIC_WEIGHT 82.1 kg       CV ECHO KAREN - BZI_METRIC_HEIGHT 157.5 cm      EF(MOD-bp) 74.0 %      LA dimension(2D) 3.9 cm      Echo EF Estimated 65 %     Narrative:      · Estimated left ventricular EF = 65% Estimated left ventricular EF was in   agreement with the calculated left ventricular EF. Left ventricular   systolic function is normal.  · There is mainly affecting the non-coronary and left coronary cusp(s).  · The right ventricular cavity is mildly dilated.  · Estimated right ventricular systolic pressure from tricuspid   regurgitation is normal (<35 mmHg).             Results for orders placed during the hospital encounter of 12/30/20   Duplex Venous Lower Extremity - Bilateral CAR    Narrative · Normal bilateral lower extremity venous duplex scan.          Results for orders placed  during the hospital encounter of 12/30/20   Adult Transthoracic Echo Complete W/ Cont if Necessary Per Protocol    Narrative · Estimated left ventricular EF = 65% Estimated left ventricular EF was in   agreement with the calculated left ventricular EF. Left ventricular   systolic function is normal.  · There is mainly affecting the non-coronary and left coronary cusp(s).  · The right ventricular cavity is mildly dilated.  · Estimated right ventricular systolic pressure from tricuspid   regurgitation is normal (<35 mmHg).          Xr Chest 1 View    Result Date: 1/11/2021   1. Continued left lower lobe consolidation with a small left pleural effusion. 2. Stable elevation right hemidiaphragm. 3. Resolved pulmonary edema  Electronically Signed By-Den Connell MD On:1/11/2021 2:33 PM This report was finalized on [id number] by  Den Connell MD.    Xr Chest 1 View    Result Date: 1/5/2021  Increased pulmonary edema from previous study. Persistent bibasilar airspace opacities atelectasis versus pneumonia. Small bilateral pleural effusions.  Electronically Signed By-Muriel Spencer MD On:1/5/2021 10:10 PM This report was finalized on [id number] by  Muriel Spencer MD.          Xrays, labs reviewed personally by physician.    Medication Review:   I have reviewed the patient's current medication list      Scheduled Meds  amLODIPine, 10 mg, Oral, Daily With Lunch  budesonide-formoterol, 2 puff, Inhalation, BID - RT  docusate sodium, 100 mg, Oral, BID  guaiFENesin, 600 mg, Oral, Q12H  ipratropium-albuterol, 3 mL, Nebulization, Q4H - RT  levothyroxine, 100 mcg, Oral, QAM AC  methylnaltrexone, 6 mg, Subcutaneous, Every Other Day  metoclopramide, 10 mg, Intravenous, Q6H  mirtazapine, 7.5 mg, Oral, Nightly  pantoprazole, 40 mg, Oral, Q AM  polyethylene glycol, 17 g, Oral, Daily  rosuvastatin, 10 mg, Oral, Nightly  sodium chloride, 10 mL, Intravenous, Q12H        Meds Infusions  lactated ringers, 9 mL/hr, Last Rate: Stopped (12/30/20  2009)  phenylephrine, 0.5-3 mcg/kg/min  sodium chloride, 75 mL/hr, Last Rate: Stopped (01/11/21 1014)        Meds PRN  •  acetaminophen **OR** acetaminophen  •  influenza vaccine  •  lactated ringers  •  magnesium sulfate **OR** magnesium sulfate **OR** magnesium sulfate  •  Morphine  •  [DISCONTINUED] HYDROmorphone **AND** naloxone  •  [DISCONTINUED] Morphine **AND** naloxone  •  ondansetron **OR** ondansetron  •  oxyCODONE  •  phenylephrine  •  potassium chloride **OR** potassium chloride **OR** potassium chloride  •  promethazine **OR** promethazine  •  risperiDONE  •  saline  •  sodium chloride        Assessment/Plan   Assessment/Plan     Active Hospital Problems    Diagnosis  POA   • **Hiatal hernia with gastroesophageal reflux [K21.9, K44.9]  Unknown   • Acute respiratory failure with hypoxia and hypercapnia (CMS/HCC) [J96.01, J96.02]  Unknown   • Abdominal pain [R10.9]  Unknown   • Nocturnal hypoxia [G47.34]  Yes   • LIBBY (obstructive sleep apnea) [G47.33]  Unknown      Resolved Hospital Problems   No resolved problems to display.       MEDICAL DECISION MAKING COMPLEXITY BY PROBLEM:     Acute on chronic hypoxic respiratory failure d/t LLL Pneumonia and LIBBY  - likely realted to narcotics and LIBBY vs LLL CAP  - CXR 12/31/20: new LLL infiltrate  - CT chest reviewed b/l LL atelectasis vs infiltrate  - wean supplemental oxygen as tolerated   - compliant with AVAP  - Nebulizers, symbicort  - completed course of Zosyn  - encourage aggressive pulmonary toilet  - CXR 1/11- personally reviewed and shows LLL consolidation with small left pleural effusion  - Echo shows EF of 65% and no diastolic dysfunction  - pulmonary following    Acute Kidney Injury   - Likely from over diuresing  - improving  - dc IV fluids   - monitor with routine vital signs and make adjustments as needed     Hiatal hernia with GERD s/p laparoscopic hiatal hernia repair with gastropexy  - Possible postoperative ileus, resolved  - General surgery  primary postop per them  - PPI, carafate, reglan prn  - diet as per general surgery     Hypomagnesemia  - replaced     Chronic anxiety  - Risperidone as needed helps, dose increased     Hypertension  - Continue home meds  - Monitor and adjust blood pressure prn    CLL  - WBC chronically elevated,(baseline 15-20 range)    DVT prophylaxis  - SCDs      VTE Prophylaxis -   Mechanical Order History:      Ordered        12/30/20 1513  Place Sequential Compression Device  Once         12/30/20 1513  Place Sequential Compression Device  Once         12/30/20 1513  Maintain Sequential Compression Device  Continuous                 Pharmalogical Order History:     None            Code Status -   Code Status and Medical Interventions:   Ordered at: 12/30/20 1513     Code Status:    CPR     Medical Interventions (Level of Support Prior to Arrest):    Full       This patient has been examined wearing appropriate Personal Protective Equipment. 01/11/21        Discharge Planning  Discharge home once oxygen requirement decrease        Electronically signed by Violet Fletcher DO, 01/11/21, 16:04 EST.  [facility]ist Team

## 2021-01-12 LAB
ANION GAP SERPL CALCULATED.3IONS-SCNC: 9 MMOL/L (ref 5–15)
BUN SERPL-MCNC: 24 MG/DL (ref 8–23)
BUN/CREAT SERPL: 19.2 (ref 7–25)
CALCIUM SPEC-SCNC: 8.2 MG/DL (ref 8.6–10.5)
CHLORIDE SERPL-SCNC: 97 MMOL/L (ref 98–107)
CO2 SERPL-SCNC: 27 MMOL/L (ref 22–29)
CREAT SERPL-MCNC: 1.25 MG/DL (ref 0.57–1)
GFR SERPL CREATININE-BSD FRML MDRD: 41 ML/MIN/1.73
GLUCOSE BLDC GLUCOMTR-MCNC: 132 MG/DL (ref 70–105)
GLUCOSE SERPL-MCNC: 118 MG/DL (ref 65–99)
MAGNESIUM SERPL-MCNC: 1.9 MG/DL (ref 1.6–2.4)
POTASSIUM SERPL-SCNC: 4.4 MMOL/L (ref 3.5–5.2)
SODIUM SERPL-SCNC: 133 MMOL/L (ref 136–145)

## 2021-01-12 PROCEDURE — 97530 THERAPEUTIC ACTIVITIES: CPT

## 2021-01-12 PROCEDURE — 99232 SBSQ HOSP IP/OBS MODERATE 35: CPT | Performed by: INTERNAL MEDICINE

## 2021-01-12 PROCEDURE — 80048 BASIC METABOLIC PNL TOTAL CA: CPT | Performed by: INTERNAL MEDICINE

## 2021-01-12 PROCEDURE — 97116 GAIT TRAINING THERAPY: CPT

## 2021-01-12 PROCEDURE — 94799 UNLISTED PULMONARY SVC/PX: CPT

## 2021-01-12 PROCEDURE — 83735 ASSAY OF MAGNESIUM: CPT | Performed by: INTERNAL MEDICINE

## 2021-01-12 PROCEDURE — 25010000002 METHYLNALTREXONE 12 MG/0.6ML SOLUTION: Performed by: SURGERY

## 2021-01-12 PROCEDURE — 25010000002 METOCLOPRAMIDE PER 10 MG: Performed by: SURGERY

## 2021-01-12 PROCEDURE — 82962 GLUCOSE BLOOD TEST: CPT

## 2021-01-12 RX ORDER — BENZONATATE 100 MG/1
200 CAPSULE ORAL 3 TIMES DAILY PRN
Status: DISCONTINUED | OUTPATIENT
Start: 2021-01-12 | End: 2021-01-13 | Stop reason: HOSPADM

## 2021-01-12 RX ORDER — GUAIFENESIN/DEXTROMETHORPHAN 100-10MG/5
5 SYRUP ORAL EVERY 4 HOURS PRN
Status: DISCONTINUED | OUTPATIENT
Start: 2021-01-12 | End: 2021-01-13 | Stop reason: HOSPADM

## 2021-01-12 RX ORDER — METOCLOPRAMIDE HYDROCHLORIDE 5 MG/ML
5 INJECTION INTRAMUSCULAR; INTRAVENOUS EVERY 6 HOURS
Status: DISCONTINUED | OUTPATIENT
Start: 2021-01-12 | End: 2021-01-13 | Stop reason: HOSPADM

## 2021-01-12 RX ADMIN — IPRATROPIUM BROMIDE AND ALBUTEROL SULFATE 3 ML: 2.5; .5 SOLUTION RESPIRATORY (INHALATION) at 19:45

## 2021-01-12 RX ADMIN — GUAIFENESIN AND DEXTROMETHORPHAN 5 ML: 100; 10 SYRUP ORAL at 17:24

## 2021-01-12 RX ADMIN — METHYLNALTREXONE BROMIDE 6 MG: 12 INJECTION, SOLUTION SUBCUTANEOUS at 08:17

## 2021-01-12 RX ADMIN — IPRATROPIUM BROMIDE AND ALBUTEROL SULFATE 3 ML: 2.5; .5 SOLUTION RESPIRATORY (INHALATION) at 11:05

## 2021-01-12 RX ADMIN — BENZONATATE 200 MG: 100 CAPSULE ORAL at 17:24

## 2021-01-12 RX ADMIN — LEVOTHYROXINE SODIUM 100 MCG: 125 TABLET ORAL at 08:17

## 2021-01-12 RX ADMIN — DOCUSATE SODIUM 100 MG: 100 CAPSULE, LIQUID FILLED ORAL at 08:17

## 2021-01-12 RX ADMIN — GUAIFENESIN 600 MG: 600 TABLET, EXTENDED RELEASE ORAL at 08:17

## 2021-01-12 RX ADMIN — METOCLOPRAMIDE HYDROCHLORIDE 10 MG: 5 INJECTION INTRAMUSCULAR; INTRAVENOUS at 11:53

## 2021-01-12 RX ADMIN — GUAIFENESIN AND DEXTROMETHORPHAN 5 ML: 100; 10 SYRUP ORAL at 20:44

## 2021-01-12 RX ADMIN — OXYCODONE 5 MG: 5 TABLET ORAL at 20:44

## 2021-01-12 RX ADMIN — IPRATROPIUM BROMIDE AND ALBUTEROL SULFATE 3 ML: 2.5; .5 SOLUTION RESPIRATORY (INHALATION) at 22:56

## 2021-01-12 RX ADMIN — METOCLOPRAMIDE HYDROCHLORIDE 10 MG: 5 INJECTION INTRAMUSCULAR; INTRAVENOUS at 00:44

## 2021-01-12 RX ADMIN — OXYCODONE 5 MG: 5 TABLET ORAL at 12:29

## 2021-01-12 RX ADMIN — MIRTAZAPINE 7.5 MG: 7.5 TABLET ORAL at 20:44

## 2021-01-12 RX ADMIN — Medication 10 ML: at 20:44

## 2021-01-12 RX ADMIN — METOCLOPRAMIDE HYDROCHLORIDE 5 MG: 5 INJECTION INTRAMUSCULAR; INTRAVENOUS at 17:13

## 2021-01-12 RX ADMIN — IPRATROPIUM BROMIDE AND ALBUTEROL SULFATE 3 ML: 2.5; .5 SOLUTION RESPIRATORY (INHALATION) at 02:50

## 2021-01-12 RX ADMIN — ROSUVASTATIN CALCIUM 10 MG: 10 TABLET, FILM COATED ORAL at 20:44

## 2021-01-12 RX ADMIN — AMLODIPINE BESYLATE 10 MG: 5 TABLET ORAL at 11:53

## 2021-01-12 RX ADMIN — Medication 10 ML: at 08:17

## 2021-01-12 RX ADMIN — DOCUSATE SODIUM 100 MG: 100 CAPSULE, LIQUID FILLED ORAL at 20:44

## 2021-01-12 RX ADMIN — IPRATROPIUM BROMIDE AND ALBUTEROL SULFATE 3 ML: 2.5; .5 SOLUTION RESPIRATORY (INHALATION) at 15:07

## 2021-01-12 RX ADMIN — METOCLOPRAMIDE HYDROCHLORIDE 10 MG: 5 INJECTION INTRAMUSCULAR; INTRAVENOUS at 06:23

## 2021-01-12 RX ADMIN — PANTOPRAZOLE SODIUM 40 MG: 40 TABLET, DELAYED RELEASE ORAL at 06:22

## 2021-01-12 RX ADMIN — GUAIFENESIN 600 MG: 600 TABLET, EXTENDED RELEASE ORAL at 20:44

## 2021-01-12 RX ADMIN — POLYETHYLENE GLYCOL 3350 17 G: 17 POWDER, FOR SOLUTION ORAL at 08:17

## 2021-01-12 NOTE — PROGRESS NOTES
Daily Progress Note    Hiatal hernia with gastroesophageal reflux    LIBBY (obstructive sleep apnea)    Nocturnal hypoxia    Acute respiratory failure with hypoxia and hypercapnia (CMS/HCC)    Abdominal pain    Assessment    Acute respiratory failure with hypoxia and hypercapnia  patient still requiring oxygen 8 to 10 L  CXR reviewed, right basilar atelectasis, consolidation in left lung base  Patient has required higher amounts of oxygen and is refusing to wear AVAP due to claustraphobia     2D echo 1/6/2021 EF 65% RVSP less than 35    Hiatal hernia with gastroesophageal reflux  s/p abd sx repair of large hiatal hernia 12/30/20  Surgery following    Office note  Severe obstructive sleep apnea  home sleep study showed apnea-hypopnea index 34.5  Chronic hypoxic respiratory insufficiency  on oxygen 3 L.     Completed treatment for MAC,  significant improvement in oxygen and weight   Overnight oximetry showed significant nocturnal hypoxia   BAL February 2019 positive for MAC patient has bilateral lower lobe bronchiectasis chronic cough weight loss and worsening hypoxia ,  start treatment in March 2019  Large hiatal hernia with reflux, s/p repeat bronchoscopy  on 09/13/2019 negative MAC cultures ok to proceed with surgery for hernia surgery   History of aspiration secondary to vomiting      Breast cancer s/p 2-4 s/p Right simple mastectomy and [place] lymph node biopsy  CLL  Hypothyroidism  Hypertension  Diabetes  GERD  Hyperlipidemia  Osteoarthritis    Plan    Chest x-ray 1/11/2021 reviewed showed improvement  Patient is a mouth breather we will decrease her nasal cannula from 12 L to 4 L and monitor she already have home oxygen    Lower extremity Dopplers 1/11/2021 - for DVT      Plan was discussed with patient, sister, hospitalist and nurse that the patient needs to be out of bed 3 times a day for at least an hour utilize IS     Continue following supportive measures;  Symbicort and duo nebs  Utilize morphine for pain  to assist with breathing and does not need to be in sedative state  Zosyn  Gi prophylaxis protonix  DVT prophylaxis per surgery  Diet per surgery  PT  IS/flutter         LOS: 12 days       Subjective         Objective     Vital signs for last 24 hours:  Vitals:    01/12/21 0300 01/12/21 0500 01/12/21 0539 01/12/21 0652   BP:   139/60    BP Location:       Patient Position:       Pulse: 94  89 89   Resp: 20  20    Temp:   98.1 °F (36.7 °C)    TempSrc:   Oral    SpO2: 92%  93% 91%   Weight:  84.9 kg (187 lb 2.7 oz)     Height:           Intake/Output last 3 shifts:  I/O last 3 completed shifts:  In: 2549.3 [P.O.:960; I.V.:1589.3]  Out: 50 [Urine:50]  Intake/Output this shift:  No intake/output data recorded.      Radiology  Imaging Results (Last 24 Hours)     Procedure Component Value Units Date/Time    XR Chest 1 View [[id number]] Collected: 01/11/21 1432     Updated: 01/11/21 1436    Narrative:      DATE OF EXAM:  1/11/2021 1:46 PM     PROCEDURE:  XR CHEST 1 VW-     INDICATIONS:  hypoxia; K21.6-Ajjdqj-kxdoisbvma reflux disease without esophagitis;  K44.9-Diaphragmatic hernia without obstruction or gangrene     COMPARISON:  01/05/2021     TECHNIQUE:   Single radiographic AP view of the chest was obtained.     FINDINGS:  Cardiac size is normal for the projection. The pulmonary vascular  markings are normal. Continued airspace disease at the left lung base  with a left-sided pleural effusion. Right hemidiaphragm is elevated,  unchanged. The interstitial pulmonary edema has largely cleared.        Impression:         1. Continued left lower lobe consolidation with a small left pleural  effusion.  2. Stable elevation right hemidiaphragm.  3. Resolved pulmonary edema     Electronically Signed By-Den Connell MD On:1/11/2021 2:33 PM  This report was finalized on [id number] by  Den Connell MD.          Labs:  Results from last 7 days   Lab Units 01/06/21  0907   WBC 10*3/mm3 12.80*   HEMOGLOBIN g/dL 10.1*   HEMATOCRIT %  31.1*   PLATELETS 10*3/mm3 178     Results from last 7 days   Lab Units 01/12/21  0427   SODIUM mmol/L 133*   POTASSIUM mmol/L 4.4   CHLORIDE mmol/L 97*   CO2 mmol/L 27.0   BUN mg/dL 24*   CREATININE mg/dL 1.25*   CALCIUM mg/dL 8.2*   GLUCOSE mg/dL 118*                     Results from last 7 days   Lab Units 01/12/21  0427   MAGNESIUM mg/dL 1.9                   Meds:   SCHEDULE  amLODIPine, 10 mg, Oral, Daily With Lunch  budesonide-formoterol, 2 puff, Inhalation, BID - RT  docusate sodium, 100 mg, Oral, BID  guaiFENesin, 600 mg, Oral, Q12H  ipratropium-albuterol, 3 mL, Nebulization, Q4H - RT  levothyroxine, 100 mcg, Oral, QAM AC  methylnaltrexone, 6 mg, Subcutaneous, Every Other Day  metoclopramide, 10 mg, Intravenous, Q6H  mirtazapine, 7.5 mg, Oral, Nightly  pantoprazole, 40 mg, Oral, Q AM  polyethylene glycol, 17 g, Oral, Daily  rosuvastatin, 10 mg, Oral, Nightly  sodium chloride, 10 mL, Intravenous, Q12H      Infusions  lactated ringers, 9 mL/hr, Last Rate: Stopped (12/30/20 2009)  phenylephrine, 0.5-3 mcg/kg/min      PRNs  •  acetaminophen **OR** acetaminophen  •  influenza vaccine  •  lactated ringers  •  magnesium sulfate **OR** magnesium sulfate **OR** magnesium sulfate  •  Morphine  •  [DISCONTINUED] HYDROmorphone **AND** naloxone  •  [DISCONTINUED] Morphine **AND** naloxone  •  ondansetron **OR** ondansetron  •  oxyCODONE  •  phenylephrine  •  potassium chloride **OR** potassium chloride **OR** potassium chloride  •  promethazine **OR** promethazine  •  risperiDONE  •  saline  •  sodium chloride    Physical Exam:  Physical Exam  Vitals signs reviewed.   Pulmonary:      Breath sounds: Wheezing and rhonchi present.   Abdominal:      General: There is distension.      Tenderness: There is abdominal tenderness.   Musculoskeletal:         General: Swelling present.   Skin:     General: Skin is warm and dry.   Neurological:      Mental Status: She is alert and oriented to person, place, and time.          ROS  Review of Systems   Constitutional: Positive for activity change and fatigue.   Respiratory: Positive for shortness of breath.    Gastrointestinal: Positive for abdominal distention and abdominal pain.

## 2021-01-12 NOTE — THERAPY TREATMENT NOTE
Patient Name: Diane Guan  : 1940    MRN: [id number]                              Today's Date: 2021       Admit Date: 2020    Visit Dx:     ICD-10-CM ICD-9-CM   1. Hiatal hernia with gastroesophageal reflux  K21.9 530.81    K44.9 553.3     Patient Active Problem List   Diagnosis   • Abnormality of gait   • Mixed anxiety and depressive disorder   • Primary osteoarthritis involving multiple joints   • Breast cancer (CMS/[facility])   • Chronic lymphoid leukemia (CMS/[facility])   • Depression   • Gallbladder disorder   • Hiatal hernia with gastroesophageal reflux   • Mixed hyperlipidemia   • Essential hypertension   • Acquired hypothyroidism   • Type 2 diabetes mellitus without complication, without long-term current use of insulin (CMS/[facility])   • Insomnia   • Postmenopausal status   • Shoulder pain   • Overactive bladder   • Vitamin B 12 deficiency   • Seasonal allergies   • Absolute anemia   • Vitamin D deficiency   • LIBBY (obstructive sleep apnea)   • Nocturnal hypoxia   • Acute respiratory failure with hypoxia and hypercapnia (CMS/[facility])   • Abdominal pain     Past Medical History:   Diagnosis Date   • Acute bronchitis due to human metapneumovirus 2020   • Anxiety disorder    • Arthritis    • Breast cancer (CMS/[facility]) 2019    Invasive ductal carcinoma   • Chronic lymphocytic leukemia (CLL), B-cell (CMS/[facility]) 2019   • Depression    • Disease of thyroid gland    • Diverticulosis of colon 2018    Identified on colonoscopy   • Dysphagia 2020   • Dyspnea on exertion    • Hemorrhoid    • Hiatal hernia 2020   • Hiatal hernia with GERD     large hiatal hernia with high-grade reflux on barium swallow; s/p laparoscopic fundoplication with gastropexy   • History of diabetes mellitus     no meds now   • Hyperlipidemia    • Hypertension    • Mycobacterium avium complex (CMS/[facility]) 2019    aspiration pneumonia; completed abx therapy   • OAB (overactive bladder)    • Sleep apnea     daughter states pt does  not have and doesn't have machine     Past Surgical History:   Procedure Laterality Date   • BLADDER SURGERY     • BRONCHOSCOPY N/A 9/13/2019    Procedure: BRONCHOSCOPY with bronchial washing;  Surgeon: Marnie Frankel MD;  Location: [facility] ENDOSCOPY;  Service: Pulmonary   • COLONOSCOPY  07/19/2018    severe diverticulosis   • CYST REMOVAL      Removed a fatty cyst off of her back   • ENDOSCOPY N/A 11/23/2020    Procedure: ESOPHAGOGASTRODUODENOSCOPY with dilatation ([place]ougie # 48, 50, 52, 54, 56, 58);  Surgeon: Den Plummer DO;  Location: [facility] ENDOSCOPY;  Service: General;  Laterality: N/A;  Post: large hiatal hernia, joshua's ulcers   • HIATAL HERNIA REPAIR N/A 12/30/2020    Procedure: Laparoscopic hiatal hernia repair with gastropexy;  Surgeon: Den Plummer DO;  Location: [facility] MAIN OR;  Service: General;  Laterality: N/A;   • MASTECTOMY Right 02/04/2019    Invasive ductal carcinoma   • TUBAL ABDOMINAL LIGATION       General Information     Row Name 01/12/21 1803          Physical Therapy Time and Intention    Document Type  therapy note (daily note)  -SC     Mode of Treatment  physical therapy;individual therapy  -SC     Row Name 01/12/21 1803          General Information    Existing Precautions/Restrictions  fall;oxygen therapy device and L/min on 11L HF nc  -SC     Row Name 01/12/21 1803          Cognition    Orientation Status (Cognition)  oriented x 3 pt agreeable to work with PT  -SC     Row Name 01/12/21 1803          Safety Issues, Functional Mobility    Impairments Affecting Function (Mobility)  balance;endurance/activity tolerance;strength  -SC       User Key  (r) = Recorded By, (t) = Taken By, (c) = Cosigned By    Initials Name Provider Type    SC Mya Lawton PTA Physical Therapy Assistant        Mobility     Row Name 01/12/21 1804          Bed Mobility    Sit-Supine [place] (Bed Mobility)  minimum assist (75% patient effort);verbal cues  -SC     Assistive Device (Bed Mobility)  bed  rails  -SC     Comment (Bed Mobility)  assist for ble's into the bed  -SC     Row Name 01/12/21 1804          Transfers    Comment (Transfers)  sit to stand several times during treatment session  -SC     Row Name 01/12/21 1804          Bed-Chair Transfer    Bed-Chair [place] (Transfers)  contact guard;verbal cues  -SC     Assistive Device (Bed-Chair Transfers)  walker, front-wheeled  -SC     Row Name 01/12/21 1804          Sit-Stand Transfer    Sit-Stand [place] (Transfers)  contact guard  -SC     Assistive Device (Sit-Stand Transfers)  walker, front-wheeled  -SC     Row Name 01/12/21 1804          Gait/Stairs (Locomotion)    [place] Level (Gait)  contact guard  -SC     Assistive Device (Gait)  walker, front-wheeled  -SC     Distance in Feet (Gait)  40' x's 2 and 30' x's one.  pt with seated rest break after each distance  -SC     Deviations/Abnormal Patterns (Gait)  jey decreased;stride length decreased  -SC     Bilateral Gait Deviations  forward flexed posture  -SC     Comment (Gait/Stairs)  short step lengths but improved posture.  guarded trunk  -SC       User Key  (r) = Recorded By, (t) = Taken By, (c) = Cosigned By    Initials Name Provider Type    Mya Keating PTA Physical Therapy Assistant        Obj/Interventions     Row Name 01/12/21 1809          Balance    Balance Assessment  sitting static balance;sitting dynamic balance;standing static balance;standing dynamic balance  -SC     Static Sitting Balance  WNL;sitting in chair  -SC     Dynamic Sitting Balance  WNL;sitting in chair  -SC     Static Standing Balance  WFL;supported;standing with roll walker  -SC     Dynamic Standing Balance  mild impairment;supported with roll walker  -SC       User Key  (r) = Recorded By, (t) = Taken By, (c) = Cosigned By    Initials Name Provider Type    Mya Keating, VASHTI Physical Therapy Assistant        Goals/Plan    No documentation.       Clinical Impression     Row Name 01/12/21 1811           Pain Scale: Numbers Pre/Post-Treatment    Pain Intervention(s)  Rest;Repositioned;Emotional support  -SC     Row Name 01/12/21 1811          Pain Scale: FACES Pre/Post-Treatment    Pain: FACES Scale, Pretreatment  2-->hurts little bit  -SC     Posttreatment Pain Rating  2-->hurts little bit  -SC     Pain Location - Side  Bilateral  -SC     Pain Location - Orientation  lower  -SC     Pain Location  extremity  -SC     Pre/Posttreatment Pain Comment  pt gets relief with rest  -SC     Row Name 01/12/21 1811          Therapy Assessment/Plan (PT)    Rehab Potential (PT)  good, to achieve stated therapy goals  -SC     Criteria for Skilled Interventions Met (PT)  skilled treatment is necessary  -[facility] Name 01/12/21 1811          Vital Signs    Pre SpO2 (%)  97  -SC     O2 Delivery Pre Treatment  hi-flow 11L  -SC     Intra SpO2 (%)  98  -SC     O2 Delivery Intra Treatment  hi-flow 11L  -SC     Post SpO2 (%)  98  -SC     O2 Delivery Post Treatment  hi-flow 11L  -[facility] Name 01/12/21 1811          Positioning and Restraints    Pre-Treatment Position  sitting in chair/recliner  -SC     Post Treatment Position  bed  -SC     In Bed  notified nsg;supine;call light within reach;fowlers;with family/caregiver  -SC       User Key  (r) = Recorded By, (t) = Taken By, (c) = Cosigned By    Initials Name Provider Type    Mya Keating, PTA Physical Therapy Assistant        Outcome Measures     Row Name 01/12/21 1813          How much help from another person do you currently need...    Turning from your back to your side while in flat bed without using bedrails?  3  -SC     Moving from lying on back to sitting on the side of a flat bed without bedrails?  3  -SC     Moving to and from a bed to a chair (including a wheelchair)?  3  -SC     Standing up from a chair using your arms (e.g., wheelchair, bedside chair)?  3  -SC     Climbing 3-5 steps with a railing?  2  -SC     To walk in hospital room?  3  -SC     AM-PAC  6 Clicks Score (PT)  17  -SC     Row Name 01/12/21 1813          Functional Assessment    Outcome Measure Options  AM-PAC 6 Clicks Basic Mobility (PT)  -SC       User Key  (r) = Recorded By, (t) = Taken By, (c) = Cosigned By    Initials Name Provider Type    Mya Keating PTA Physical Therapy Assistant        Physical Therapy Education                 Title: PT OT SLP Therapies (Done)     Topic: Physical Therapy (Done)     Point: Mobility training (Done)     Learning Progress Summary           Patient Acceptance, E,TB, VU by SC at 1/12/2021 1814    Acceptance, E,TB, VU by SC at 1/11/2021 1747    Acceptance, E,TB, VU by  at 1/10/2021 1617    Acceptance, E, DU,NR by  at 1/8/2021 1543    Comment: Reinforced safe hand placement c rwx. use and transfers.    Acceptance, E, VU by  at 1/8/2021 0312    Acceptance, D,E, DU,NR by  at 1/7/2021 1604    Comment: Reinforced safe hand placement c rwx. use and transfers.    Acceptance, E,TB, VU by EL at 1/5/2021 1325    Acceptance, E,TB, VU by SC at 1/4/2021 1905    Acceptance, E,TB, VU by  at 1/3/2021 1712    Acceptance, E,TB, VU by MI at 12/31/2020 1121   Family Acceptance, E,TB, VU by SC at 1/11/2021 1747    Acceptance, E, VU by  at 1/8/2021 0312                   Point: Precautions (Done)     Learning Progress Summary           Patient Acceptance, E,TB, VU by SC at 1/12/2021 1814    Acceptance, E,TB, VU by SC at 1/11/2021 1747    Acceptance, E,TB, VU by  at 1/10/2021 1617    Acceptance, E, DU,NR by  at 1/8/2021 1543    Comment: Reinforced safe hand placement c rwx. use and transfers.    Acceptance, E, VU by JS at 1/8/2021 0312    Acceptance, D,E, DU,NR by  at 1/7/2021 1604    Comment: Reinforced safe hand placement c rwx. use and transfers.    Acceptance, E,TB, VU by EL at 1/5/2021 1325    Acceptance, E,TB, VU by SC at 1/4/2021 1905    Acceptance, E,TB, VU by MI at 12/31/2020 1121   Family Acceptance, E,TB, VU by SC at 1/11/2021 1747    Acceptance, E,  VU by GERA at 1/8/2021 0312                               User Key     Initials Effective Dates Name Provider Type Discipline     03/01/19 -  Kinga Delatorre, PT Physical Therapist PT    SC 03/01/19 -  Mya Lawton, VASHTI Physical Therapy Assistant PT     03/01/19 -  Florence Aragon PTA Physical Therapy Assistant PT     03/01/19 -  Jess Marion PTA Physical Therapy Assistant PT    EL 06/23/20 -  Chencho Rojas PT Physical Therapist PT    JS 06/19/20 -  Anjum Ortez, RN Registered Nurse Nurse    MI 03/01/19 -  Ubaldo Elder, LUCINA Registered Nurse Nurse              PT Recommendation and Plan     Plan of Care Reviewed With: patient, sibling  Progress: improving  Outcome Summary: pt mau tx session much better today.  pt amb 3 short distances with sats maintaining in the mid 90's.  slow improvement with endurance.   PT recommend home with 24/7 assist at d/c.   HHPT to follow.  PPE used:  mask, safety glasses and gloves     Time Calculation:   PT Charges     Row Name 01/12/21 1816             Time Calculation    Start Time  0843  -SC      Stop Time  0924  -SC      Time Calculation (min)  41 min  -SC      PT Received On  01/12/21  -SC      PT - Next Appointment  01/13/21  -SC         Time Calculation- PT    Total Timed Code Minutes- PT  41 minute(s)  -SC         Timed Charges    17800 - Gait Training Minutes   23  -SC      01771 - PT Therapeutic Activity Minutes  18  -SC        User Key  (r) = Recorded By, (t) = Taken By, (c) = Cosigned By    Initials Name Provider Type    SC Mya Lawton, VASHTI Physical Therapy Assistant        Therapy Charges for Today     Code Description Service Date Service Provider Modifiers Qty    56477860788 HC GAIT TRAINING EA 15 MIN 1/11/2021 Mya Lawton, PTA GP 1    01173855802 HC PT THERAPEUTIC ACT EA 15 MIN 1/11/2021 Mya Lawton, PTA GP 1    02996583653 HC GAIT TRAINING EA 15 MIN 1/12/2021 Mya Lawton, PTA GP 2    86233745384 HC PT THERAPEUTIC ACT EA 15 MIN  1/12/2021 Mya Lawton, PTA GP 1          PT G-Codes  Outcome Measure Options: AM-PAC 6 Clicks Basic Mobility (PT)  AM-PAC 6 Clicks Score (PT): 17    yMa Lawton, PTA  1/12/2021

## 2021-01-12 NOTE — PROGRESS NOTES
[facility] Medicine Services Daily Progress Note      Hospitalist Team  LOS 12 days      Patient Care Team:  Jemima Fontaine MD as PCP - General (Family Medicine)    Patient Location: 2105/1      Subjective   Subjective     Chief Complaint / Subjective  No chief complaint on file.  follow up shortness of breath       Brief Synopsis of Hospital Course/HPI     80-year-old female who was having issues with GERD was found to have hiatal hernia and is underwent fixation per general surgery.  She has been admitted postoperatively medicine has been consulted to help with her medical problems.  Her blood pressure stable she is having some apnea spells from the sedation but overall her breathing is stable.  No other issues.  As reported she is slightly sedated from the procedure and HPI was slightly limited.         Date::    12/31/20: had worsening respiratory status last night and has been on HFNC this am. Refusing to wear BIPAP although likely needs it fo LIBBY.    1/1/20: on BIPAP this am and thus HPI limited. Events of overnight noted was moved to higher level of care. O2 is stable today. Seems O2 worse when not deep breathing and when sleeping as has LIBBY.     1/2/21: no real improvement. Having issues with pain medication and sedation not allowing pulmonary toilet. Will not wear BIPAP    1/3/21: Slight improvement today.  Per the nurse she had been using her incentive spirometer and been more ambulatory.  Encourage patient with pulmonology at bedside to continue to increase activity    1/4/2021: Patient seen and examined this morning.  Remains short of breath and on high flow.  Encouraged to ambulate more and use examination amatory, patient agrees.  No other complaints or acute events overnight per nursing.    1/5/2021: Patient seen this morning.  Still the same, shortness of breath the same.  Remains on high flow.  No other complaints.    1/6/2021: Patient seen and examined this morning.   Feeling slightly better but not significantly improved.  Remains on high flow.  Chest x-ray noted to have bilateral congestion, started on Lasix, echo ordered.    1/7/2021: Patient seen examined this morning.  Feeling a little anxious today but shortness of breath has improved.  Oxygen requirement improving.  No acute events overnight per nursing.     1/8/2021: Patient seen and examined this morning.  Patient feels significant improvement today, weaned off with precision flow and now on high flow at 12 L now.  Only complaint  She wants to eat some solid food and not be on liquid diet anymore.  Discussed with nursing to check with surgery to advance diet if possible today.    1/9/2021: Patient seen and examined this morning.  Having some coughing episode and nausea today after trying some GI soft diet with eggs and hash brown.  Wants to go back on liquid diet, will notify surgery.  Patient still having intermittent anxiety, risperidone as needed helping but not lasting enough, dose increased.  Remains on high flow, wean off as tolerated, discussed with nursing.  Discussed with family at bedside, all questions answered.    1/10/2021: Patient seen examined this morning.  Feeling much better today, remains on high flow, discussed with nursing to wean off nasal cannula today.  Still LEXUS, IVF restarted.  Plan for discharge once weaned down to nasal cannula home requirement.    Denies fever, chills, chest pain, nausea, vomiting, diarrhea, dysuria, or dizziness.    1/11/2021- Patient had to be increased back to 15L high flow overnight. Patient is anxious to leave.     1/12/2021- Patient still with high oxygen requirements. Encourage up to chair and IS.   Discussed with patient sister at bedside.       Review of Systems   Cardiovascular: Negative for chest pain and palpitations.   Respiratory: Positive for cough and shortness of breath.    Gastrointestinal: Negative for abdominal pain, nausea and vomiting.         Objective  "  Objective      Vital Signs  Temp:  [98.1 °F (36.7 °C)-99.5 °F (37.5 °C)] 99 °F (37.2 °C)  Heart Rate:  [] 94  Resp:  [18-24] 18  BP: (131-151)/(48-60) 151/58  Oxygen Therapy  SpO2: 90 %  Pulse Oximetry Type: Continuous  Device (Oxygen Therapy): high-flow nasal cannula, humidified  $ High Flow Nasal Cannula Set-Up: yes  Flow (L/min): 4  Oxygen Concentration (%): 80  Oximetry Probe Site Changed: Yes  Flowsheet Rows      First Filed Value   Admission Height  157.5 cm (62\") Documented at 12/16/2020 1444   Admission Weight  81.6 kg (180 lb) Documented at 12/16/2020 1444        Intake & Output (last 3 days)       01/09 0701 - 01/10 0700 01/10 0701 - 01/11 0700 01/11 0701 - 01/12 0700 01/12 0701 - 01/13 0700    P.O. 560 1080 960     I.V. (mL/kg)  2152.3 (26.3)      Total Intake(mL/kg) 560 (6.8) 3232.3 (39.5) 960 (11.3)     Urine (mL/kg/hr) 500 (0.3) 50 (0)      Stool  0      Total Output 500 50      Net +60 +3182.3 +960             Urine Unmeasured Occurrence  1 x 2 x         Lines, Drains & Airways    Active LDAs     Name:   Placement date:   Placement time:   Site:   Days:    Peripheral IV 12/31/20 0938 Anterior;Left Forearm   12/31/20    0938    Forearm   less than 1                Physical Exam  Vitals signs reviewed.   Constitutional:       General: She is not in acute distress.     Appearance: She is obese.   HENT:      Head: Normocephalic and atraumatic.      Mouth/Throat:      Mouth: Mucous membranes are moist.   Cardiovascular:      Rate and Rhythm: Normal rate and regular rhythm.   Pulmonary:      Effort: No respiratory distress.      Breath sounds: Rales present.      Comments: Decreased breath sounds throughout  Abdominal:      General: Bowel sounds are normal. There is no distension.      Palpations: Abdomen is soft.      Tenderness: There is no abdominal tenderness.   Skin:     General: Skin is warm and dry.      Findings: No rash.   Neurological:      Mental Status: She is alert. Mental status is at " baseline.      Cranial Nerves: No cranial nerve deficit.   Psychiatric:         Mood and Affect: Mood normal.         Behavior: Behavior normal.               Wounds (last 24 hours)      LDA Wound     Row Name 01/12/21 1614 01/12/21 1244 01/12/21 0800       Wound 12/30/20 1037 abdomen Incision    Wound - Properties Group Placement Date: 12/30/20  -AM Placement Time: 1037  -AM Location: abdomen  -AM Primary Wound Type: Incision  -AM    Closure  BRIANNE  -AD  BRIANNE  -AD  BRIANNE  -AD    Base  dressing in place, unable to visualize  -AD  dressing in place, unable to visualize  -AD  dressing in place, unable to visualize  -AD    Periwound  intact;dry  -AD  intact;dry  -AD  intact;dry  -AD    Periwound Temperature  warm  -AD  warm  -AD  warm  -AD    Drainage Amount  none  -AD  none  -AD  none  -AD    Retired Wound - Properties Group Date first assessed: 12/30/20  -AM Time first assessed: 1037  -AM Location: abdomen  -AM Primary Wound Type: Incision  -AM    Row Name 01/12/21 0400 01/12/21 0000 01/11/21 2000       Wound 12/30/20 1037 abdomen Incision    Wound - Properties Group Placement Date: 12/30/20  -AM Placement Time: 1037  -AM Location: abdomen  -AM Primary Wound Type: Incision  -AM    Dressing Appearance  --  --  dry;intact  -SS    Closure  BRIANNE  -SS  BRIANNE  -SS  BRIANNE  -SS    Base  dressing in place, unable to visualize  -SS  dressing in place, unable to visualize  -SS  dressing in place, unable to visualize  -SS    Periwound  intact;dry  -SS  intact;dry  -SS  intact;dry  -SS    Periwound Temperature  warm  -SS  warm  -SS  warm  -SS    Retired Wound - Properties Group Date first assessed: 12/30/20  -AM Time first assessed: 1037  -AM Location: abdomen  -AM Primary Wound Type: Incision  -AM      User Key  (r) = Recorded By, (t) = Taken By, (c) = Cosigned By    Initials Name Provider Type    AM Ping Martinez RN Registered Nurse    Ping Hernandez RN Registered Nurse    Tammy Ash RN Registered Nurse           Procedures:    Procedure(s):  Laparoscopic hiatal hernia repair with gastropexy          Results Review:     I reviewed the patient's new clinical results.      Lab Results (last 24 hours)     Procedure Component Value Units Date/Time    POC Glucose Once [[id number]]  (Abnormal) Collected: 01/12/21 1358    Specimen: Blood Updated: 01/12/21 1359     Glucose 132 mg/dL      Comment: Serial Number: [id number]:  [id number]       Basic Metabolic Panel [[id number]]  (Abnormal) Collected: 01/12/21 0427    Specimen: Blood Updated: 01/12/21 0612     Glucose 118 mg/dL      BUN 24 mg/dL      Creatinine 1.25 mg/dL      Sodium 133 mmol/L      Potassium 4.4 mmol/L      Chloride 97 mmol/L      CO2 27.0 mmol/L      Calcium 8.2 mg/dL      eGFR Non African Amer 41 mL/min/1.73      BUN/Creatinine Ratio 19.2     Anion Gap 9.0 mmol/L     Narrative:      GFR Normal >60  Chronic Kidney Disease <60  Kidney Failure <15      Magnesium [[id number]]  (Normal) Collected: 01/12/21 0427    Specimen: Blood Updated: 01/12/21 0612     Magnesium 1.9 mg/dL         No results found for: HGBA1C            No results found for: LIPASE  Lab Results   Component Value Date    CHOL 374 (H) 01/03/2020    TRIG 311 (H) 01/03/2020    HDL 50 01/03/2020     (H) 01/03/2020       Lab Results   Lab Value Date/Time    FINALDX  12/31/2020 0205     Leukocytosis with absolute atypical lymphocytosis  Anemia  No blasts identified  If CBC indices persist/worsen, consider flow cytometry or other studies as clinically indicated to exclude a lymphoproliferative disorder      FINALDX  02/06/2020 1405     Absolute atypical lymphocytosis.  Anemia.  No blasts identified.  Recommend clinical follow-up and additional studies to include flow cytometry as clinically indicated to exclude CLL/SLL or other lymphoproliferative disorder.         Microbiology Results (last 10 days)     ** No results found for the last 240 hours. **          ECG/EMG Results (most recent)      Procedure Component Value Units Date/Time    ECG 12 Lead [[id number]] Collected: 12/30/20 1314     Updated: 01/02/21 1511     QT Interval 421 ms     Narrative:      HEART RATE= 71  bpm  RR Interval= 844  ms  PA Interval= 172  ms  P Horizontal Axis= -9  deg  P Front Axis= 48  deg  QRSD Interval= 101  ms  QT Interval= 421  ms  QRS Axis= 41  deg  T Wave Axis= 77  deg  - ABNORMAL ECG -  Sinus rhythm With old anterior septal MI  Low voltage, precordial leads  When compared with ECG of 18-Dec-2020 14:29:46,  Poor R wave progression V1 to V3 noted  Electronically Signed By: Fausto Jean (PAVAN) 02-Jan-2021 15:05:56  Date and Time of Study: 2020-12-30 13:14:52    Adult Transthoracic Echo Complete W/ Cont if Necessary Per Protocol [[id number]] Collected: 01/06/21 1415     Updated: 01/06/21 1826     BSA 1.8 m^2      RVIDd 2.5 cm      IVSd 0.89 cm      LVIDd 5.1 cm      LVIDs 2.9 cm      LVPWd 0.93 cm      IVS/LVPW 0.96     FS 42.3 %      EDV(Teich) 122.8 ml      ESV(Teich) 33.1 ml      EF(Teich) 73.1 %      EDV(cubed) 131.2 ml      ESV(cubed) 25.2 ml      EF(cubed) 80.8 %      LV mass(C)d 164.9 grams      LV mass(C)dI 90.0 grams/m^2      SV(Teich) 89.7 ml      SI(Teich) 49.0 ml/m^2      SV(cubed) 106.1 ml      SI(cubed) 57.9 ml/m^2      Ao root diam 3.2 cm      Ao root area 8.0 cm^2      ACS 1.7 cm      asc Aorta Diam 2.3 cm      LVOT diam 2.0 cm      LVOT area 3.0 cm^2      RVOT diam 2.1 cm      RVOT area 3.3 cm^2      EDV(MOD-sp4) 71.6 ml      ESV(MOD-sp4) 18.7 ml      EF(MOD-sp4) 73.8 %      SV(MOD-sp4) 52.9 ml      SI(MOD-sp4) 28.9 ml/m^2      Ao root area (BSA corrected) 1.7     LV Leonard Vol (BSA corrected) 39.1 ml/m^2      LV Sys Vol (BSA corrected) 10.2 ml/m^2      Aortic R-R 0.72 sec      Aortic HR 83.4 BPM      MV E max mindy 148.1 cm/sec      MV A max mindy 117.7 cm/sec      MV E/A 1.3     MV V2 max 125.6 cm/sec      MV max PG 6.3 mmHg      MV V2 mean 87.9 cm/sec      MV mean PG 3.4 mmHg      MV V2 VTI 32.8 cm       MVA(VTI) 2.6 cm^2      MV dec slope 900.2 cm/sec^2      MV dec time 0.16 sec      Ao pk garry 162.4 cm/sec      Ao max PG 10.6 mmHg      Ao max PG (full) 4.9 mmHg      Ao V2 mean 115.5 cm/sec      Ao mean PG 6.0 mmHg      Ao mean PG (full) 2.7 mmHg      Ao V2 VTI 34.3 cm      EDILMA(I,A) 2.5 cm^2      EDILMA(I,D) 2.5 cm^2      EDILMA(V,A) 2.2 cm^2      EDILMA(V,D) 2.2 cm^2      AI max garry 441.0 cm/sec      AI max PG 77.8 mmHg      AI dec slope 379.7 cm/sec^2      AI dec time 1.2 sec      AI P1/2t 340.1 msec      LV V1 max PG 5.7 mmHg      LV V1 mean PG 3.2 mmHg      LV V1 max 119.1 cm/sec      LV V1 mean 84.0 cm/sec      LV V1 VTI 27.8 cm      MR max garry 519.2 cm/sec      MR max .8 mmHg      CO(Ao) 23.0 l/min      CI(Ao) 12.6 l/min/m^2      SV(Ao) 275.9 ml      SI(Ao) 150.6 ml/m^2      CO(LVOT) 7.0 l/min      CI(LVOT) 3.8 l/min/m^2      SV(LVOT) 84.1 ml      SV(RVOT) 62.9 ml      SI(LVOT) 45.9 ml/m^2      PA V2 max 90.0 cm/sec      PA max PG 3.2 mmHg      PA max PG (full) 0.43 mmHg      PA V2 mean 72.3 cm/sec      PA mean PG 2.2 mmHg      PA mean PG (full) 0.66 mmHg      PA V2 VTI 23.7 cm      PVA(I,A) 2.7 cm^2      BH CV ECHO KAREN - PVA(I,D) 2.7 cm^2      BH CV ECHO KAREN - PVA(V,A) 3.1 cm^2      BH CV ECHO KAREN - PVA(V,D) 3.1 cm^2      PA acc time 0.11 sec      PI end-d garry 49.7 cm/sec      PI max garry 236.6 cm/sec      PI max PG 22.4 mmHg      RV V1 max PG 2.8 mmHg      RV V1 mean PG 1.5 mmHg      RV V1 max 83.8 cm/sec      RV V1 mean 57.3 cm/sec      RV V1 VTI 19.0 cm      TR max garry 277.2 cm/sec      RVSP(TR) 33.7 mmHg      RAP systole 3.0 mmHg      PA pr(Accel) 27.6 mmHg      Pulm Sys Garry 66.4 cm/sec      Pulm Leonard Garry 43.5 cm/sec      Pulm S/D 1.5     Qp/Qs 0.75     Pulm A Revs Dur 0.09 sec      Pulm A Revs Garry 29.6 cm/sec       CV ECHO KAREN - BZI_BMI 33.1 kilograms/m^2       CV ECHO KAREN - BSA(HAYCOCK) 1.9 m^2       CV ECHO KAREN - BZI_METRIC_WEIGHT 82.1 kg       CV ECHO KAREN - BZI_METRIC_HEIGHT 157.5 cm       EF(MOD-bp) 74.0 %      LA dimension(2D) 3.9 cm      Echo EF Estimated 65 %     Narrative:      · Estimated left ventricular EF = 65% Estimated left ventricular EF was in   agreement with the calculated left ventricular EF. Left ventricular   systolic function is normal.  · There is mainly affecting the non-coronary and left coronary cusp(s).  · The right ventricular cavity is mildly dilated.  · Estimated right ventricular systolic pressure from tricuspid   regurgitation is normal (<35 mmHg).             Results for orders placed during the hospital encounter of 12/30/20   Duplex Venous Lower Extremity - Bilateral CAR    Narrative · Normal bilateral lower extremity venous duplex scan.          Results for orders placed during the hospital encounter of 12/30/20   Adult Transthoracic Echo Complete W/ Cont if Necessary Per Protocol    Narrative · Estimated left ventricular EF = 65% Estimated left ventricular EF was in   agreement with the calculated left ventricular EF. Left ventricular   systolic function is normal.  · There is mainly affecting the non-coronary and left coronary cusp(s).  · The right ventricular cavity is mildly dilated.  · Estimated right ventricular systolic pressure from tricuspid   regurgitation is normal (<35 mmHg).          Xr Chest 1 View    Result Date: 1/11/2021   1. Continued left lower lobe consolidation with a small left pleural effusion. 2. Stable elevation right hemidiaphragm. 3. Resolved pulmonary edema  Electronically Signed By-Den Connell MD On:1/11/2021 2:33 PM This report was finalized on [id number] by  Den Connell MD.    Xr Chest 1 View    Result Date: 1/5/2021  Increased pulmonary edema from previous study. Persistent bibasilar airspace opacities atelectasis versus pneumonia. Small bilateral pleural effusions.  Electronically Signed By-Muriel Spencer MD On:1/5/2021 10:10 PM This report was finalized on [id number] by  Muriel Spencer MD.          Xrays, labs reviewed personally by  physician.    Medication Review:   I have reviewed the patient's current medication list      Scheduled Meds  amLODIPine, 10 mg, Oral, Daily With Lunch  budesonide-formoterol, 2 puff, Inhalation, BID - RT  docusate sodium, 100 mg, Oral, BID  guaiFENesin, 600 mg, Oral, Q12H  ipratropium-albuterol, 3 mL, Nebulization, Q4H - RT  levothyroxine, 100 mcg, Oral, QAM AC  methylnaltrexone, 6 mg, Subcutaneous, Every Other Day  metoclopramide, 5 mg, Intravenous, Q6H  mirtazapine, 7.5 mg, Oral, Nightly  pantoprazole, 40 mg, Oral, Q AM  polyethylene glycol, 17 g, Oral, Daily  rosuvastatin, 10 mg, Oral, Nightly  sodium chloride, 10 mL, Intravenous, Q12H        Meds Infusions  lactated ringers, 9 mL/hr, Last Rate: Stopped (12/30/20 2009)  phenylephrine, 0.5-3 mcg/kg/min        Meds PRN  •  acetaminophen **OR** acetaminophen  •  benzonatate  •  guaiFENesin-dextromethorphan  •  influenza vaccine  •  lactated ringers  •  magnesium sulfate **OR** magnesium sulfate **OR** magnesium sulfate  •  Morphine  •  [DISCONTINUED] HYDROmorphone **AND** naloxone  •  [DISCONTINUED] Morphine **AND** naloxone  •  ondansetron **OR** ondansetron  •  oxyCODONE  •  phenylephrine  •  potassium chloride **OR** potassium chloride **OR** potassium chloride  •  promethazine **OR** promethazine  •  risperiDONE  •  saline  •  sodium chloride        Assessment/Plan   Assessment/Plan     Active Hospital Problems    Diagnosis  POA   • **Hiatal hernia with gastroesophageal reflux [K21.9, K44.9]  Unknown   • Acute respiratory failure with hypoxia and hypercapnia (CMS/HCC) [J96.01, J96.02]  Unknown   • Abdominal pain [R10.9]  Unknown   • Nocturnal hypoxia [G47.34]  Yes   • LIBBY (obstructive sleep apnea) [G47.33]  Unknown      Resolved Hospital Problems   No resolved problems to display.       MEDICAL DECISION MAKING COMPLEXITY BY PROBLEM:     Acute on chronic hypoxic respiratory failure d/t LLL Pneumonia and LIBBY  - likely realted to narcotics and LIBBY vs LLL CAP  -  CXR 12/31/20: new LLL infiltrate  - CT chest reviewed b/l LL atelectasis vs infiltrate  - wean supplemental oxygen as tolerated   - compliant with AVAP  - Nebulizers, symbicort  - completed course of Zosyn  - encourage aggressive pulmonary toilet  - CXR 1/11- personally reviewed and shows LLL consolidation with small left pleural effusion  - Echo shows EF of 65% and no diastolic dysfunction  - pulmonary following  - continue mucinex  - add tessalon pearls and robitussin     Acute Kidney Injury - improving   - Likely from over diuresing  - dc IV fluids   - monitor with routine vital signs and make adjustments as needed     Hiatal hernia with GERD s/p laparoscopic hiatal hernia repair with gastropexy  - Possible postoperative ileus, resolved  - General surgery primary postop per them  - PPI, carafate, reglan prn  - diet as per general surgery     Hypomagnesemia  - replaced     Chronic anxiety  - Risperidone as needed helps, dose increased     Hypertension  - Continue home meds  - Monitor and adjust blood pressure prn    CLL  - WBC chronically elevated,(baseline 15-20 range)    DVT prophylaxis  - SCDs      VTE Prophylaxis -   Mechanical Order History:      Ordered        12/30/20 1513  Place Sequential Compression Device  Once         12/30/20 1513  Place Sequential Compression Device  Once         12/30/20 1513  Maintain Sequential Compression Device  Continuous                 Pharmalogical Order History:     None            Code Status -   Code Status and Medical Interventions:   Ordered at: 12/30/20 1513     Code Status:    CPR     Medical Interventions (Level of Support Prior to Arrest):    Full       This patient has been examined wearing appropriate Personal Protective Equipment. 01/12/21        Discharge Planning  Discharge home once oxygen requirement decreased and ok with pulmonology         Electronically signed by Violet Fletcher DO, 01/12/21, 17:21 EST.  [facility] Hospitalist Team

## 2021-01-12 NOTE — PLAN OF CARE
Goal Outcome Evaluation:  Plan of Care Reviewed With: patient, sibling  Progress: improving     Pt expected to DC once on 2L O2.

## 2021-01-12 NOTE — PLAN OF CARE
Goal Outcome Evaluation:  Plan of Care Reviewed With: patient, sibling  Progress: improving  Outcome Summary: pt mau tx session much better today.  pt amb 3 short distances with sats maintaining in the mid 90's.  slow improvement with endurance.   PT recommend home with 24/7 assist at d/c.   HHPT to follow.  PPE used:  mask, safety glasses and gloves

## 2021-01-12 NOTE — PLAN OF CARE
Goal Outcome Evaluation:  Plan of Care Reviewed With: patient  Progress: improving  Outcome Summary: Pt down to 4L NC. Possibly home soon if able to tolerate decreased O2 amount.

## 2021-01-13 ENCOUNTER — READMISSION MANAGEMENT (OUTPATIENT)
Dept: CALL CENTER | Facility: HOSPITAL | Age: 81
End: 2021-01-13

## 2021-01-13 VITALS
HEIGHT: 62 IN | TEMPERATURE: 98.7 F | HEART RATE: 85 BPM | DIASTOLIC BLOOD PRESSURE: 44 MMHG | BODY MASS INDEX: 34.97 KG/M2 | WEIGHT: 190.04 LBS | RESPIRATION RATE: 18 BRPM | SYSTOLIC BLOOD PRESSURE: 132 MMHG | OXYGEN SATURATION: 95 %

## 2021-01-13 LAB
ANION GAP SERPL CALCULATED.3IONS-SCNC: 11 MMOL/L (ref 5–15)
BUN SERPL-MCNC: 22 MG/DL (ref 8–23)
BUN/CREAT SERPL: 17.3 (ref 7–25)
CALCIUM SPEC-SCNC: 8.5 MG/DL (ref 8.6–10.5)
CHLORIDE SERPL-SCNC: 98 MMOL/L (ref 98–107)
CO2 SERPL-SCNC: 26 MMOL/L (ref 22–29)
CREAT SERPL-MCNC: 1.27 MG/DL (ref 0.57–1)
GFR SERPL CREATININE-BSD FRML MDRD: 41 ML/MIN/1.73
GLUCOSE SERPL-MCNC: 128 MG/DL (ref 65–99)
POTASSIUM SERPL-SCNC: 4.3 MMOL/L (ref 3.5–5.2)
SODIUM SERPL-SCNC: 135 MMOL/L (ref 136–145)

## 2021-01-13 PROCEDURE — 94799 UNLISTED PULMONARY SVC/PX: CPT

## 2021-01-13 PROCEDURE — G0008 ADMIN INFLUENZA VIRUS VAC: HCPCS | Performed by: INTERNAL MEDICINE

## 2021-01-13 PROCEDURE — 90686 IIV4 VACC NO PRSV 0.5 ML IM: CPT | Performed by: INTERNAL MEDICINE

## 2021-01-13 PROCEDURE — 25010000002 INFLUENZA VAC SPLIT QUAD 0.5 ML SUSPENSION PREFILLED SYRINGE: Performed by: INTERNAL MEDICINE

## 2021-01-13 PROCEDURE — 80048 BASIC METABOLIC PNL TOTAL CA: CPT | Performed by: INTERNAL MEDICINE

## 2021-01-13 PROCEDURE — 25010000002 METOCLOPRAMIDE PER 10 MG: Performed by: SURGERY

## 2021-01-13 PROCEDURE — 99239 HOSP IP/OBS DSCHRG MGMT >30: CPT | Performed by: INTERNAL MEDICINE

## 2021-01-13 RX ORDER — BUDESONIDE AND FORMOTEROL FUMARATE DIHYDRATE 160; 4.5 UG/1; UG/1
2 AEROSOL RESPIRATORY (INHALATION)
Qty: 1 INHALER | Refills: 0 | Status: SHIPPED | OUTPATIENT
Start: 2021-01-13 | End: 2021-03-26 | Stop reason: SDUPTHER

## 2021-01-13 RX ORDER — HYDROCODONE BITARTRATE AND ACETAMINOPHEN 7.5; 325 MG/1; MG/1
1 TABLET ORAL EVERY 6 HOURS PRN
Qty: 12 TABLET | Refills: 0 | Status: SHIPPED | OUTPATIENT
Start: 2021-01-13 | End: 2021-07-02

## 2021-01-13 RX ORDER — POLYETHYLENE GLYCOL 3350 17 G/17G
17 POWDER, FOR SOLUTION ORAL DAILY
Qty: 510 G | Refills: 0 | Status: SHIPPED | OUTPATIENT
Start: 2021-01-13 | End: 2021-02-12

## 2021-01-13 RX ORDER — BENZONATATE 200 MG/1
200 CAPSULE ORAL 3 TIMES DAILY PRN
Qty: 15 CAPSULE | Refills: 0 | Status: SHIPPED | OUTPATIENT
Start: 2021-01-13 | End: 2021-07-02

## 2021-01-13 RX ORDER — HYDROCODONE BITARTRATE AND ACETAMINOPHEN 7.5; 325 MG/1; MG/1
1 TABLET ORAL EVERY 6 HOURS PRN
Qty: 12 TABLET | Refills: 0 | Status: SHIPPED | OUTPATIENT
Start: 2021-01-13 | End: 2021-01-13 | Stop reason: SDUPTHER

## 2021-01-13 RX ADMIN — METOCLOPRAMIDE HYDROCHLORIDE 5 MG: 5 INJECTION INTRAMUSCULAR; INTRAVENOUS at 00:28

## 2021-01-13 RX ADMIN — IPRATROPIUM BROMIDE AND ALBUTEROL SULFATE 3 ML: 2.5; .5 SOLUTION RESPIRATORY (INHALATION) at 06:39

## 2021-01-13 RX ADMIN — Medication 10 ML: at 08:23

## 2021-01-13 RX ADMIN — PANTOPRAZOLE SODIUM 40 MG: 40 TABLET, DELAYED RELEASE ORAL at 04:43

## 2021-01-13 RX ADMIN — BENZONATATE 200 MG: 100 CAPSULE ORAL at 08:23

## 2021-01-13 RX ADMIN — GUAIFENESIN 600 MG: 600 TABLET, EXTENDED RELEASE ORAL at 08:23

## 2021-01-13 RX ADMIN — LEVOTHYROXINE SODIUM 100 MCG: 125 TABLET ORAL at 08:23

## 2021-01-13 RX ADMIN — METOCLOPRAMIDE HYDROCHLORIDE 5 MG: 5 INJECTION INTRAMUSCULAR; INTRAVENOUS at 04:43

## 2021-01-13 RX ADMIN — POLYETHYLENE GLYCOL 3350 17 G: 17 POWDER, FOR SOLUTION ORAL at 08:23

## 2021-01-13 RX ADMIN — OXYCODONE 5 MG: 5 TABLET ORAL at 10:37

## 2021-01-13 RX ADMIN — DOCUSATE SODIUM 100 MG: 100 CAPSULE, LIQUID FILLED ORAL at 08:23

## 2021-01-13 RX ADMIN — RISPERIDONE 0.5 MG: 0.5 TABLET, ORALLY DISINTEGRATING ORAL at 00:28

## 2021-01-13 RX ADMIN — BENZONATATE 200 MG: 100 CAPSULE ORAL at 00:28

## 2021-01-13 RX ADMIN — GUAIFENESIN AND DEXTROMETHORPHAN 5 ML: 100; 10 SYRUP ORAL at 04:43

## 2021-01-13 RX ADMIN — INFLUENZA VIRUS VACCINE 0.5 ML: 15; 15; 15; 15 SUSPENSION INTRAMUSCULAR at 10:32

## 2021-01-13 NOTE — PLAN OF CARE
Goal Outcome Evaluation:  Plan of Care Reviewed With: patient, sibling  Progress: no change   The patient did not have much sleep tonight. Her vitals have been stable and she is in good spirits about going home. The patient's oxygen had to be titrated up to 7L while she was asleep. Will continue to monitor.

## 2021-01-13 NOTE — OUTREACH NOTE
Prep Survey      Responses   [facility] patient discharged from?  [facility]   Is LACE score < 7 ?  No   Emergency Room discharge w/ pulse ox?  No   Eligibility  [facility]   Date of Admission  12/30/20   Date of Discharge  01/13/21   Discharge Disposition  Home or Self Care   Discharge diagnosis  Hiatal hernia with GERD s/p laparoscopic hiatal hernia repair with gastropexy,   Acute on chronic hypoxic respiratory failure d/t LLL Pneumonia and LIBBY     Does the patient have one of the following disease processes/diagnoses(primary or secondary)?  General Surgery   Does the patient have Home health ordered?  No   Is there a DME ordered?  No   Prep survey completed?  Yes          Catia Byrd RN

## 2021-01-13 NOTE — DISCHARGE SUMMARY
Date of Admission: 12/30/2020    Date of Discharge:  1/13/2021    Length of stay:  LOS: 13 days     Admission Diagnosis:   Hiatal hernia with gastroesophageal reflux [K21.9, K44.9]  Hiatal hernia with gastroesophageal reflux [K21.9, K44.9]  Hiatal hernia with gastroesophageal reflux [K21.9, K44.9]      Discharge Diagnosis:     Acute on chronic hypoxic respiratory failure d/t LLL Pneumonia and LIBBY  - likely realted to narcotics and LIBBY vs LLL CAP  - CXR 12/31/20: new LLL infiltrate  - CT chest reviewed b/l LL atelectasis vs infiltrate  - Nebulizers, symbicort  - completed course of Zosyn  - CXR 1/11- personally reviewed and shows LLL consolidation with small left pleural effusion  - Echo shows EF of 65% and no diastolic dysfunction  - follow up with pulmonology      Acute Kidney Injury - improving   - Likely from over diuresing     Hiatal hernia with GERD s/p laparoscopic hiatal hernia repair with gastropexy  - Possible postoperative ileus, resolved  - continue ppi, reglan  - soft diet  - follow up with Dr. Plummer  - hydrocodone prescription re-sent as previously ordered by Dr. Plummer      Hypomagnesemia  - replaced      Chronic anxiety     Hypertension  - Continue medication as below     CLL  - WBC chronically elevated,(baseline 15-20 range)       Active Hospital Problems    Diagnosis  POA   • **Hiatal hernia with gastroesophageal reflux [K21.9, K44.9]  Unknown   • Acute respiratory failure with hypoxia and hypercapnia (CMS/HCC) [J96.01, J96.02]  Unknown   • Abdominal pain [R10.9]  Unknown   • Nocturnal hypoxia [G47.34]  Yes   • LIBBY (obstructive sleep apnea) [G47.33]  Yes      Resolved Hospital Problems   No resolved problems to display.       Hospital Course:  Patient is a 80 y.o. female who was having issues with GERD was found to have hiatal hernia and is underwent fixation per general surgery.  She has been admitted postoperatively medicine has been consulted to help with her medical problems.  Her blood  pressure stable she is having some apnea spells from the sedation but overall her breathing is stable.        12/31/20: had worsening respiratory status last night and has been on HFNC this am. Refusing to wear BIPAP although likely needs it fo LIBBY.     1/1/20: on BIPAP this am and thus HPI limited. Events of overnight noted was moved to higher level of care. O2 is stable today. Seems O2 worse when not deep breathing and when sleeping as has LIBBY.      1/2/21: no real improvement. Having issues with pain medication and sedation not allowing pulmonary toilet. Will not wear BIPAP     1/3/21: Slight improvement today.  Per the nurse she had been using her incentive spirometer and been more ambulatory.  Encourage patient with pulmonology at bedside to continue to increase activity     1/4/2021: Patient seen and examined this morning.  Remains short of breath and on high flow.  Encouraged to ambulate more and use examination amatory, patient agrees.  No other complaints or acute events overnight per nursing.     1/5/2021: Patient seen this morning.  Still the same, shortness of breath the same.  Remains on high flow.  No other complaints.     1/6/2021: Patient seen and examined this morning.  Feeling slightly better but not significantly improved.  Remains on high flow.  Chest x-ray noted to have bilateral congestion, started on Lasix, echo ordered.     1/7/2021: Patient seen examined this morning.  Feeling a little anxious today but shortness of breath has improved.  Oxygen requirement improving.  No acute events overnight per nursing.     1/8/2021: Patient seen and examined this morning.  Patient feels significant improvement today, weaned off with precision flow and now on high flow at 12 L now.  Only complaint  She wants to eat some solid food and not be on liquid diet anymore.  Discussed with nursing to check with surgery to advance diet if possible today.     1/9/2021: Patient seen and examined this morning.  Having  some coughing episode and nausea today after trying some GI soft diet with eggs and hash brown.  Wants to go back on liquid diet, will notify surgery.  Patient still having intermittent anxiety, risperidone as needed helping but not lasting enough, dose increased.  Remains on high flow, wean off as tolerated, discussed with nursing.  Discussed with family at bedside, all questions answered.     1/10/2021: Patient seen examined this morning.  Feeling much better today, remains on high flow, discussed with nursing to wean off nasal cannula today.  Still LEXUS, IVF restarted.  Plan for discharge once weaned down to nasal cannula home requirement.     Denies fever, chills, chest pain, nausea, vomiting, diarrhea, dysuria, or dizziness.     1/11/2021- Patient had to be increased back to 15L high flow overnight. Patient is anxious to leave.      1/12/2021- Patient still with high oxygen requirements. Encourage up to chair and IS.   Discussed with patient sister at bedside.     1/13/2021- Patient down to 5L NC. She will be discharged home in stable condition.     Procedures Performed:    Procedure(s):  Laparoscopic hiatal hernia repair with gastropexy  -------------------       Consults:   Consults     Date and Time Order Name Status Description    12/31/2020 2022 Inpatient Pulmonology Consult Completed           Vital Signs:  Temp:  [97.6 °F (36.4 °C)-99.2 °F (37.3 °C)] 98.7 °F (37.1 °C)  Heart Rate:  [] 85  Resp:  [18-20] 18  BP: (127-154)/(44-61) 132/44        Physical Exam:  Physical Exam  Vitals signs reviewed.   Constitutional:       General: She is not in acute distress.     Appearance: She is obese.   Cardiovascular:      Rate and Rhythm: Normal rate and regular rhythm.   Pulmonary:      Effort: Pulmonary effort is normal. No respiratory distress.      Breath sounds: Rales present.   Abdominal:      General: Bowel sounds are normal.      Palpations: Abdomen is soft.   Musculoskeletal:      Right lower leg: No  edema.      Left lower leg: No edema.   Skin:     General: Skin is warm and dry.   Neurological:      Mental Status: She is alert.   Psychiatric:         Mood and Affect: Mood normal.         Behavior: Behavior normal.           Pertinent Test Results:   Lab Results (last 72 hours)     Procedure Component Value Units Date/Time    Basic Metabolic Panel [[id number]]  (Abnormal) Collected: 01/13/21 0258    Specimen: Blood Updated: 01/13/21 0402     Glucose 128 mg/dL      BUN 22 mg/dL      Creatinine 1.27 mg/dL      Sodium 135 mmol/L      Potassium 4.3 mmol/L      Chloride 98 mmol/L      CO2 26.0 mmol/L      Calcium 8.5 mg/dL      eGFR Non African Amer 41 mL/min/1.73      BUN/Creatinine Ratio 17.3     Anion Gap 11.0 mmol/L     Narrative:      GFR Normal >60  Chronic Kidney Disease <60  Kidney Failure <15      POC Glucose Once [[id number]]  (Abnormal) Collected: 01/12/21 1358    Specimen: Blood Updated: 01/12/21 1359     Glucose 132 mg/dL      Comment: Serial Number: [id number]:  [id number]       Basic Metabolic Panel [[id number]]  (Abnormal) Collected: 01/12/21 0427    Specimen: Blood Updated: 01/12/21 0612     Glucose 118 mg/dL      BUN 24 mg/dL      Creatinine 1.25 mg/dL      Sodium 133 mmol/L      Potassium 4.4 mmol/L      Chloride 97 mmol/L      CO2 27.0 mmol/L      Calcium 8.2 mg/dL      eGFR Non African Amer 41 mL/min/1.73      BUN/Creatinine Ratio 19.2     Anion Gap 9.0 mmol/L     Narrative:      GFR Normal >60  Chronic Kidney Disease <60  Kidney Failure <15      Magnesium [[id number]]  (Normal) Collected: 01/12/21 0427    Specimen: Blood Updated: 01/12/21 0612     Magnesium 1.9 mg/dL     Basic Metabolic Panel [[id number]]  (Abnormal) Collected: 01/11/21 0541    Specimen: Blood Updated: 01/11/21 0622     Glucose 119 mg/dL      BUN 29 mg/dL      Creatinine 1.47 mg/dL      Sodium 135 mmol/L      Potassium 4.6 mmol/L      Comment: Slight hemolysis detected by analyzer. Results may be affected.        Chloride 99  mmol/L      CO2 26.0 mmol/L      Calcium 8.1 mg/dL      eGFR Non African Amer 34 mL/min/1.73      BUN/Creatinine Ratio 19.7     Anion Gap 10.0 mmol/L     Narrative:      GFR Normal >60  Chronic Kidney Disease <60  Kidney Failure <15      Magnesium [[id number]]  (Normal) Collected: 01/11/21 0541    Specimen: Blood Updated: 01/11/21 0622     Magnesium 2.1 mg/dL             Results for orders placed during the hospital encounter of 12/30/20   Adult Transthoracic Echo Complete W/ Cont if Necessary Per Protocol    Narrative · Estimated left ventricular EF = 65% Estimated left ventricular EF was in   agreement with the calculated left ventricular EF. Left ventricular   systolic function is normal.  · There is mainly affecting the non-coronary and left coronary cusp(s).  · The right ventricular cavity is mildly dilated.  · Estimated right ventricular systolic pressure from tricuspid   regurgitation is normal (<35 mmHg).          Imaging Results (All)     Procedure Component Value Units Date/Time    XR Chest 1 View [[id number]] Collected: 01/11/21 1432     Updated: 01/11/21 1436    Narrative:      DATE OF EXAM:  1/11/2021 1:46 PM     PROCEDURE:  XR CHEST 1 VW-     INDICATIONS:  hypoxia; K21.0-Qxxzvt-ntrtperkzi reflux disease without esophagitis;  K44.9-Diaphragmatic hernia without obstruction or gangrene     COMPARISON:  01/05/2021     TECHNIQUE:   Single radiographic AP view of the chest was obtained.     FINDINGS:  Cardiac size is normal for the projection. The pulmonary vascular  markings are normal. Continued airspace disease at the left lung base  with a left-sided pleural effusion. Right hemidiaphragm is elevated,  unchanged. The interstitial pulmonary edema has largely cleared.        Impression:         1. Continued left lower lobe consolidation with a small left pleural  effusion.  2. Stable elevation right hemidiaphragm.  3. Resolved pulmonary edema     Electronically Signed By-Den Connell MD On:1/11/2021 2:33  PM  This report was finalized on [id number] by  Den Connell MD.    XR Chest 1 View [[id number]] Collected: 01/05/21 2209     Updated: 01/05/21 2212    Narrative:      DATE OF EXAM:  1/5/2021 8:59 PM     PROCEDURE:  XR CHEST 1 VW-     INDICATIONS:  Shortness of breath; K21.0-Zlefih-esrfpgxxkt reflux disease without  esophagitis; K44.9-Diaphragmatic hernia without obstruction or gangrene     COMPARISON:  01/02/2021     TECHNIQUE:   Single radiographic view of the chest was obtained.     FINDINGS:  Elevation of the right diaphragm. Cardiomegaly. Bibasilar airspace  opacities with probable small pleural effusions. Increased septal  thickening compared to previous study. No pneumothorax.       Impression:      Increased pulmonary edema from previous study. Persistent bibasilar  airspace opacities atelectasis versus pneumonia. Small bilateral pleural  effusions.     Electronically Signed By-Muriel Spencer MD On:1/5/2021 10:10 PM  This report was finalized on [id number] by  Muriel Spencer MD.    XR Chest 1 View [[id number]] Collected: 01/02/21 1137     Updated: 01/02/21 1141    Narrative:      XR CHEST 1 VW-     Date of Exam: 1/2/2021 11:00 AM     Indication: dyspne; K21.1-Bnqjeo-nzotwuxdvf reflux disease without  esophagitis; K44.9-Diaphragmatic hernia without obstruction or gangrene.     Comparison: 12/31/2020     Technique: A single view of the chest was obtained.     FINDINGS:      Cardiomegaly is stable.  Pulmonary vessels are normal.  Lung  volumes are low.  There is persistent left lower lobe airspace  consolidation which is unchanged.  Right lung appears clear.  There are  small bilateral pleural effusions.  Bony structures are unremarkable.             Impression:            1.  Low volume inspiration with persistent left lower lobe airspace  consolidation which is unchanged.  2.  Small bilateral pleural effusions.        Electronically Signed By-Jan Vernon MD On:1/2/2021 11:39 AM  This report was finalized on  [id number] by  Jan Vernon MD.    CT Chest Without Contrast [[id number]] Collected: 01/01/21 0122     Updated: 01/01/21 0333    Narrative:      Addendum: I notified the nurse taking care of the patient, Belinda of the Bellamy catheter malposition at 0131 hours.    Electronically signed by:  Vinicius Richardson M.D.    1/1/2021 1:32 AM    CT Abdomen Pelvis Without Contrast [[id number]] Collected: 01/01/21 0122     Updated: 01/01/21 0333    Narrative:      Addendum: I notified the nurse taking care of the patient, Belinda of the Bellamy catheter malposition at 0131 hours.    Electronically signed by:  Vinicius Richardson M.D.    1/1/2021 1:32 AM    XR Chest 1 View [[id number]] Collected: 12/31/20 0909     Updated: 12/31/20 0915    Narrative:      DATE OF EXAM:  12/31/2020 8:57 AM     PROCEDURE:  XR CHEST 1 VW-     INDICATIONS:  hypoxia; K21.1-Zleveu-pnysvrmykf reflux disease without esophagitis;  K44.9-Diaphragmatic hernia without obstruction or gangrene     COMPARISON:  12/18/2020     TECHNIQUE:   Single radiographic AP view of the chest was obtained.     FINDINGS:  Cardiac size is enlarged. There is consolidation in the left lower lobe  in the retrocardiac region. There is a large hiatal hernia. There is new  increased density in the right base which may merely represent  atelectasis. The upper lobes are clear.        Impression:         1. Consolidation in the left lung base which appears new.  2. Lower lung volumes with right basilar atelectasis.  3. Cardiomegaly without acute cardiac decompensation.  4. Right basilar atelectasis.     Electronically Signed By-Den Connell MD On:12/31/2020 9:10 AM  This report was finalized on [id number] by  Den Connell MD.                Discharge Disposition:  Home-Health Care [facility] [place]    Discharge Medications:     Discharge Medications      New Medications      Instructions Start Date   benzonatate 200 MG capsule  Commonly known as: TESSALON   200 mg, Oral, 3 Times Daily PRN       budesonide-formoterol 160-4.5 MCG/ACT inhaler  Commonly known as: SYMBICORT   2 puffs, Inhalation, 2 Times Daily - RT      HYDROcodone-acetaminophen 7.5-325 MG per tablet  Commonly known as: Norco   1 tablet, Oral, Every 6 Hours PRN      polyethylene glycol 17 GM/SCOOP powder  Commonly known as: MiraLax   17 g, Oral, Daily         Changes to Medications      Instructions Start Date   amLODIPine 10 MG tablet  Commonly known as: NORVASC  What changed:   · when to take this  · additional instructions   10 mg, Oral, Daily With Lunch      anastrozole 1 MG tablet  Commonly known as: ARIMIDEX  What changed:   · when to take this  · additional instructions   1 mg, Oral, Daily      ipratropium-albuterol 0.5-2.5 mg/3 ml nebulizer  Commonly known as: DUO-NEB  What changed:   · when to take this  · reasons to take this   3 mL, Nebulization, Every 4 Hours - RT, J 44.9      levothyroxine 100 MCG tablet  Commonly known as: SYNTHROID, LEVOTHROID  What changed: additional instructions   100 mcg, Oral, Every Morning Before Breakfast, Take on empty stomach.      metoclopramide 5 MG tablet  Commonly known as: REGLAN  What changed: additional instructions   5 mg, Oral, 3 Times Daily PRN      pantoprazole 40 MG EC tablet  Commonly known as: Protonix  What changed: additional instructions   40 mg, Oral, Every Morning Before Breakfast, Take on an empty stomach!      vitamin D 1.25 MG (66999 UT) capsule capsule  Commonly known as: ERGOCALCIFEROL  What changed: additional instructions   50,000 Units, Oral, Every 7 Days         Continue These Medications      Instructions Start Date   cetirizine 10 MG tablet  Commonly known as: zyrTEC   10 mg, Oral, Every Night at Bedtime      docusate sodium 100 MG capsule  Commonly known as: COLACE   100 mg, Oral, 2 Times Daily      ferrous sulfate 324 (65 Fe) MG tablet delayed-release EC tablet   324 mg, Oral, 3 Times Weekly      mirtazapine 7.5 MG tablet  Commonly known as: REMERON   7.5 mg, Oral,  Nightly      rosuvastatin 10 MG tablet  Commonly known as: CRESTOR   10 mg, Oral, Nightly         Stop These Medications    chlorthalidone 25 MG tablet  Commonly known as: HYGROTON     losartan 100 MG tablet  Commonly known as: COZAAR     sertraline 100 MG tablet  Commonly known as: ZOLOFT     sucralfate 1 g tablet  Commonly known as: CARAFATE            Discharge Diet:   Diet Instructions     Diet: Soft Texture; Thin Liquids, No Restrictions; Whole      Discharge Diet: Soft Texture    Fluid Consistency: Thin Liquids, No Restrictions    Soft Options: Whole          Activity at Discharge:   Activity Instructions     Activity as Tolerated            Follow-up Appointments  Future Appointments   Date Time Provider Department Center   2/18/2021  8:45 AM Jemima Fontaine MD [facility]       Additional Instructions for the Follow-ups that You Need to Schedule     Discharge Follow-up with PCP   As directed       Currently Documented PCP:    Jemima Fontaine MD    PCP Phone Number:    [phone]     Follow Up Details: in 3-5 days or sooner if needed         Discharge Follow-up with Specified Provider: Dr. Plummer as directed   As directed      To: Dr. Plummer as directed         Discharge Follow-up with Specified Provider: Dr. Frankel as instructed   As directed      To: Dr. Frankel as instructed               Test Results Pending at Discharge:  None     Condition on Discharge:    Stable      Risk for Readmission (LACE): Score: 12 (1/13/2021  6:00 AM)          Violet Fletcher DO  01/13/21  16:51 EST    Time: Discharge 40 min with face-to-face history/exam, writing all prescriptions, and documenting discharge data including care coordination

## 2021-01-14 ENCOUNTER — TRANSITIONAL CARE MANAGEMENT TELEPHONE ENCOUNTER (OUTPATIENT)
Dept: CALL CENTER | Facility: HOSPITAL | Age: 81
End: 2021-01-14

## 2021-01-14 NOTE — OUTREACH NOTE
Call Center TCM Note      Responses   [facility] patient discharged from?  [facility]   Does the patient have one of the following disease processes/diagnoses(primary or secondary)?  General Surgery   TCM attempt successful?  Yes   Call start time  1250   Revoked Reason  Patient/Family Refused [Karen hung up call. Refused to talk. ]   Call end time  1250   Discharge diagnosis  Hiatal hernia with GERD s/p laparoscopic hiatal hernia repair with gastropexy,   Acute on chronic hypoxic respiratory failure d/t LLL Pneumonia and LIBBY            Mauri Spivey RN    1/14/2021, 12:51 EST

## 2021-01-14 NOTE — PROGRESS NOTES
Case Management Discharge Note                Selected Continued Care - Discharged on 1/13/2021 Admission date: 12/30/2020 - Discharge disposition: Home-Health Care [facility] [place]                 Final Discharge Disposition Code: 01 - home or self-care

## 2021-01-20 ENCOUNTER — OFFICE VISIT (OUTPATIENT)
Dept: FAMILY MEDICINE CLINIC | Facility: CLINIC | Age: 81
End: 2021-01-20

## 2021-01-20 ENCOUNTER — LAB (OUTPATIENT)
Dept: FAMILY MEDICINE CLINIC | Facility: CLINIC | Age: 81
End: 2021-01-20

## 2021-01-20 VITALS
BODY MASS INDEX: 32.76 KG/M2 | OXYGEN SATURATION: 95 % | HEART RATE: 72 BPM | SYSTOLIC BLOOD PRESSURE: 129 MMHG | WEIGHT: 178 LBS | HEIGHT: 62 IN | DIASTOLIC BLOOD PRESSURE: 73 MMHG | TEMPERATURE: 98.2 F

## 2021-01-20 DIAGNOSIS — F41.8 MIXED ANXIETY AND DEPRESSIVE DISORDER: ICD-10-CM

## 2021-01-20 DIAGNOSIS — G47.33 OSA (OBSTRUCTIVE SLEEP APNEA): ICD-10-CM

## 2021-01-20 DIAGNOSIS — R53.1 GENERALIZED WEAKNESS: ICD-10-CM

## 2021-01-20 DIAGNOSIS — C91.10 CHRONIC LYMPHOID LEUKEMIA (HCC): Chronic | ICD-10-CM

## 2021-01-20 DIAGNOSIS — K21.9 HIATAL HERNIA WITH GERD: ICD-10-CM

## 2021-01-20 DIAGNOSIS — E03.9 ACQUIRED HYPOTHYROIDISM: ICD-10-CM

## 2021-01-20 DIAGNOSIS — Z09 HOSPITAL DISCHARGE FOLLOW-UP: ICD-10-CM

## 2021-01-20 DIAGNOSIS — J96.21 ACUTE ON CHRONIC RESPIRATORY FAILURE WITH HYPOXIA (HCC): Primary | ICD-10-CM

## 2021-01-20 DIAGNOSIS — K44.9 HIATAL HERNIA WITH GERD: ICD-10-CM

## 2021-01-20 LAB
FERRITIN SERPL-MCNC: 298 NG/ML (ref 13–150)
HCT VFR BLD AUTO: 33.9 % (ref 34–46.6)
HGB BLD-MCNC: 10.4 G/DL (ref 12–15.9)
IRON 24H UR-MRATE: 40 MCG/DL (ref 37–145)
IRON SATN MFR SERPL: 12 % (ref 20–50)
TIBC SERPL-MCNC: 340 MCG/DL (ref 298–536)
TRANSFERRIN SERPL-MCNC: 228 MG/DL (ref 200–360)
TSH SERPL DL<=0.05 MIU/L-ACNC: 3.31 UIU/ML (ref 0.27–4.2)

## 2021-01-20 PROCEDURE — 82728 ASSAY OF FERRITIN: CPT | Performed by: FAMILY MEDICINE

## 2021-01-20 PROCEDURE — 1111F DSCHRG MED/CURRENT MED MERGE: CPT | Performed by: FAMILY MEDICINE

## 2021-01-20 PROCEDURE — 99496 TRANSJ CARE MGMT HIGH F2F 7D: CPT | Performed by: FAMILY MEDICINE

## 2021-01-20 PROCEDURE — 84466 ASSAY OF TRANSFERRIN: CPT | Performed by: FAMILY MEDICINE

## 2021-01-20 PROCEDURE — 85018 HEMOGLOBIN: CPT | Performed by: FAMILY MEDICINE

## 2021-01-20 PROCEDURE — 85014 HEMATOCRIT: CPT | Performed by: FAMILY MEDICINE

## 2021-01-20 PROCEDURE — 36415 COLL VENOUS BLD VENIPUNCTURE: CPT | Performed by: FAMILY MEDICINE

## 2021-01-20 PROCEDURE — 83540 ASSAY OF IRON: CPT | Performed by: FAMILY MEDICINE

## 2021-01-20 PROCEDURE — 84443 ASSAY THYROID STIM HORMONE: CPT | Performed by: FAMILY MEDICINE

## 2021-01-20 RX ORDER — CHLORTHALIDONE 25 MG/1
25 TABLET ORAL DAILY
COMMUNITY
End: 2021-05-17

## 2021-01-20 RX ORDER — LOSARTAN POTASSIUM 100 MG/1
100 TABLET ORAL DAILY
COMMUNITY
End: 2021-05-17

## 2021-01-20 RX ORDER — SERTRALINE HYDROCHLORIDE 100 MG/1
150 TABLET, FILM COATED ORAL DAILY
Qty: 135 TABLET | Refills: 1
Start: 2021-01-20 | End: 2021-03-25

## 2021-01-20 RX ORDER — SERTRALINE HYDROCHLORIDE 100 MG/1
100 TABLET, FILM COATED ORAL DAILY
COMMUNITY
End: 2021-01-20 | Stop reason: SDUPTHER

## 2021-01-20 NOTE — PROGRESS NOTES
Transitional Care Follow Up Visit  Subjective     Diane Guan is a 80 y.o. female who presents for a transitional care management visit.    Within 48 business hours after discharge our office contacted her via telephone to coordinate her care and needs.      I reviewed and discussed the details of that call along with the discharge summary, hospital problems, inpatient lab results, inpatient diagnostic studies, and consultation reports with Diane.     Current outpatient and discharge medications have been reconciled for the patient.  Reviewed by: Jemima Fontaine MD      Date of TCM Phone Call 1/13/2021   [facility]   Date of Admission 12/30/2020   Date of Discharge 1/13/2021   Discharge Disposition Home or Self Care     Risk for Readmission (LACE) Score: 12 (1/13/2021  6:00 AM)      History of Present Illness   Course During Hospital Stay:    Postmenopausal obese  female with a concurrent medical history of hiatal hernia with associated gastroesophageal reflux disease presents for hospital follow-up.  Patient underwent laparoscopic hiatal hernia repair with gastropexy on December 30, 2020.  Surgery and hospitalization was complicated by acute on chronic hypoxic respiratory failure secondary to left lower lobe pneumonia versus atelectasis, LIBBY, and opiate use postoperatively. Patient was previously prescribed 2L O2 nightly because she cannot tolerate CPAP. Patient was placed on high flow oxygen via nasal cannula after initially refusing BiPAP.  Respiratory status improved with BiPAP and course of Zosyn.  Oxygen therapy was gradually deescalated and patient underwent an echocardiogram that revealed preserved left ventricular ejection fraction.  Patient was discharged home on 5 L via nasal cannula.    Saturating appropriately on 4L today. Patient continues to be dyspneic, specifically with activity, and complains of overwhelming fatigue; eager to go home. Using incentive  "spirometer daily. Unwilling to use Symbicort inhaler though using DuoNeb BID. Complaining of anxiety and restlessness despite Zoloft 100 mg and mirtazipine 7.5 mg nightly; cannot get comfortable.       The following portions of the patient's history were reviewed and updated as appropriate: allergies, current medications, past family history, past medical history, past social history, past surgical history and problem list.    Review of Systems   Constitutional: Positive for fatigue. Negative for chills, diaphoresis and fever.   HENT: Negative for congestion, rhinorrhea, sneezing and sore throat.    Eyes: Negative for discharge and redness.   Respiratory: Positive for shortness of breath. Negative for cough and wheezing.    Cardiovascular: Negative for chest pain and leg swelling.   Gastrointestinal: Positive for abdominal pain and constipation. Negative for diarrhea, nausea and vomiting.   Genitourinary: Negative for difficulty urinating, dysuria and hematuria.   Musculoskeletal: Negative for gait problem and joint swelling.   Skin: Negative for rash and wound.   Neurological: Negative for seizures, syncope, light-headedness and headaches.   Psychiatric/Behavioral: Positive for sleep disturbance (complaining of restless legs). Negative for behavioral problems and confusion. The patient is nervous/anxious.        Objective    Vitals:    01/20/21 1002   BP: 129/73   BP Location: Left arm   Patient Position: Sitting   Cuff Size: Large Adult   Pulse: 72   Temp: 98.2 °F (36.8 °C)   TempSrc: Infrared   SpO2: 95%   Weight: 80.7 kg (178 lb)   Height: 157.5 cm (62\")     Physical Exam  Vitals signs reviewed.   Constitutional:       General: She is not in acute distress.     Appearance: She is well-developed. She is obese. She is ill-appearing (pained). She is not diaphoretic.      Interventions: Nasal cannula in place.      Comments: Sitting in a wheelchair   HENT:      Head: Normocephalic and atraumatic.      Nose: Nose " normal.   Eyes:      General: No scleral icterus.     Conjunctiva/sclera: Conjunctivae normal.   Neck:      Musculoskeletal: Normal range of motion.   Cardiovascular:      Rate and Rhythm: Normal rate and regular rhythm.   Pulmonary:      Effort: Pulmonary effort is normal.      Breath sounds: Examination of the left-lower field reveals rales. Rales present.   Abdominal:      General: A surgical scar is present. Bowel sounds are normal.      Palpations: Abdomen is soft.      Tenderness: There is abdominal tenderness.      Comments: Bandages clean, dry, and intact.   Skin:     General: Skin is warm and dry.      Capillary Refill: Capillary refill takes less than 2 seconds.   Neurological:      Mental Status: She is alert and oriented to person, place, and time.   Psychiatric:         Behavior: Behavior normal.          Lab Results   Component Value Date    WBC 12.80 (H) 01/06/2021    HGB 10.1 (L) 01/06/2021    HCT 31.1 (L) 01/06/2021    MCV 83.0 01/06/2021     01/06/2021     Lab Results   Component Value Date    IRON 40 08/18/2020    TIBC 504 08/18/2020    FERRITIN 44.00 08/18/2020     Lab Results   Component Value Date    GLUCOSE 128 (H) 01/13/2021    BUN 22 01/13/2021    CREATININE 1.27 (H) 01/13/2021    EGFRIFNONA 41 (L) 01/13/2021    EGFRIFAFRI 59 (L) 04/27/2017    BCR 17.3 01/13/2021    K 4.3 01/13/2021    CO2 26.0 01/13/2021    CALCIUM 8.5 (L) 01/13/2021    ALBUMIN 4.40 12/18/2020    LABIL2 1.0 03/17/2019    AST 15 12/18/2020    ALT 11 12/18/2020     Lab Results   Component Value Date    TSH 4.320 (H) 08/18/2020     Lab Results   Component Value Date    CHOL 374 (H) 01/03/2020    TRIG 311 (H) 01/03/2020    HDL 50 01/03/2020     (H) 01/03/2020     Results for orders placed during the hospital encounter of 12/30/20   Adult Transthoracic Echo Complete W/ Cont if Necessary Per Protocol    Narrative · Estimated left ventricular EF = 65% Estimated left ventricular EF was in   agreement with the  calculated left ventricular EF. Left ventricular   systolic function is normal.  · There is mainly affecting the non-coronary and left coronary cusp(s).  · The right ventricular cavity is mildly dilated.  · Estimated right ventricular systolic pressure from tricuspid   regurgitation is normal (<35 mmHg).        Assessment/Plan   Diagnoses and all orders for this visit:    1. Acute on chronic respiratory failure with hypoxia (CMS/HCC) (Primary)  Comments:  Encouraged compliance with Symbicort and incentive spirometer.  Continue O2 via nasal cannula.  RTO if symptoms worsen.    2. LIBBY (obstructive sleep apnea)  Comments:  Unwilling to use CPAP/BiPAP.  Continue O2 via nasal cannula nightly.    3. Hiatal hernia with GERD  Comments:  Status post laparoscopic repair with gastropexy.  Reports improvement of GERD symptoms.  Surgical sites healing well.    4. Chronic lymphoid leukemia (CMS/HCC)  Comments:  Monitoring CBC.  Orders:  -     Hemoglobin and hematocrit, blood  -     Iron Profile  -     Ferritin    5. Mixed anxiety and depressive disorder  Comments:  Exacerbated by post operative complication, PNA.  Plan to increase dose of Zoloft.  RTO if symptoms worsen.   Orders:  -     sertraline (ZOLOFT) 100 MG tablet; Take 1.5 tablets by mouth Daily.  Dispense: 135 tablet; Refill: 1    6. Acquired hypothyroidism  Comments:  Treated with levothyroxine 100 mcg daily.  Plan to recheck TSH.  Orders:  -     TSH    7. Generalized weakness  Comments:  Exacerbated by post operative complication, PNA.  Encouraged incentive spirometer and ambulation.     8. Hospital discharge follow-up             Follow Up  Return if symptoms worsen or fail to improve, for KEEP APPOINTMENT IN FEBRUARY.    Signature    Jemima Fontaine MD  [facility]        This document has been electronically signed by Jemima Fontaine MD on January 20, 2021 10:15 EST

## 2021-01-21 ENCOUNTER — READMISSION MANAGEMENT (OUTPATIENT)
Dept: CALL CENTER | Facility: HOSPITAL | Age: 81
End: 2021-01-21

## 2021-01-21 NOTE — OUTREACH NOTE
General Surgery Week 2 Survey      Responses   [facility] [place] patient discharged from?  [facility]   Does the patient have one of the following disease processes/diagnoses(primary or secondary)?  General Surgery   Week 2 attempt successful?  No   Unsuccessful attempts  Attempt 1          Ashlee Chan RN

## 2021-01-25 ENCOUNTER — READMISSION MANAGEMENT (OUTPATIENT)
Dept: CALL CENTER | Facility: HOSPITAL | Age: 81
End: 2021-01-25

## 2021-01-25 NOTE — OUTREACH NOTE
General Surgery Week 2 Survey      Responses   [facility] patient discharged from?  [facility]   Does the patient have one of the following disease processes/diagnoses(primary or secondary)?  General Surgery   Week 2 attempt successful?  Yes   Call start time  1657   Call end time  1659   Discharge diagnosis  Hiatal hernia with GERD s/p laparoscopic hiatal hernia repair with gastropexy,   Acute on chronic hypoxic respiratory failure d/t LLL Pneumonia and LIBBY     Person spoke with today (if not patient) and relationship  Marianna-daughter   Meds reviewed with patient/caregiver?  Yes   Is the patient having any side effects they believe may be caused by any medication additions or changes?  No   Does the patient have all medications related to this admission filled (includes all antibiotics, pain medications, etc.)  Yes   Is the patient taking all medications as directed (includes completed medication regime)?  Yes   Does the patient have a follow up appointment scheduled with their surgeon?  No   What is preventing the patient from scheduling follow up appointments?  Haven't had time   Nursing Interventions  Advised patient to make appointment   Has the patient kept scheduled appointments due by today?  Yes   Comments  Pt has followed up with PCP    Psychosocial issues?  No   Did the patient receive a copy of their discharge instructions?  Yes   Nursing interventions  Reviewed instructions with patient   What is the patient's perception of their health status since discharge?  Improving   Nursing interventions  Nurse provided patient education   Is the patient/caregiver able to teach back signs and symptoms of incisional infection?  Fever   Is the patient/caregiver able to teach back steps to recovery at home?  Rest and rebuild strength, gradually increase activity, Eat a well-balance diet   If the patient is a current smoker, are they able to teach back resources for cessation?  Not a smoker   Is the patient/caregiver  able to teach back the hierarchy of who to call/visit for symptoms/problems? PCP, Specialist, Home health nurse, Urgent Care, ED, 911  Yes   Week 2 call completed?  Yes          Rosa Eckert RN

## 2021-02-01 DIAGNOSIS — G25.81 RESTLESS LEG SYNDROME: Primary | ICD-10-CM

## 2021-02-01 RX ORDER — ROPINIROLE 0.25 MG/1
0.25 TABLET, FILM COATED ORAL NIGHTLY
Qty: 30 TABLET | Refills: 1 | Status: SHIPPED | OUTPATIENT
Start: 2021-02-01 | End: 2021-04-05

## 2021-02-03 ENCOUNTER — READMISSION MANAGEMENT (OUTPATIENT)
Dept: CALL CENTER | Facility: HOSPITAL | Age: 81
End: 2021-02-03

## 2021-02-03 NOTE — OUTREACH NOTE
General Surgery Week 3 Survey      Responses   [facility] patient discharged from?  [facility]   Does the patient have one of the following disease processes/diagnoses(primary or secondary)?  General Surgery   Week 3 attempt successful?  Yes   Call start time  1404   Call end time  1407   Discharge diagnosis  Hiatal hernia with GERD s/p laparoscopic hiatal hernia repair with gastropexy,   Acute on chronic hypoxic respiratory failure d/t LLL Pneumonia and LIBBY     Person spoke with today (if not patient) and relationship  Spouse   Meds reviewed with patient/caregiver?  Yes   Is the patient having any side effects they believe may be caused by any medication additions or changes?  No   Does the patient have all medications related to this admission filled (includes all antibiotics, pain medications, etc.)  Yes   Is the patient taking all medications as directed (includes completed medication regime)?  Yes   Does the patient have a follow up appointment scheduled with their surgeon?  Yes   Has the patient kept scheduled appointments due by today?  Yes   Psychosocial issues?  No   Did the patient receive a copy of their discharge instructions?  Yes   Nursing interventions  Reviewed instructions with patient   What is the patient's perception of their health status since discharge?  Improving   If the patient is a current smoker, are they able to teach back resources for cessation?  Not a smoker   Is the patient/caregiver able to teach back the hierarchy of who to call/visit for symptoms/problems? PCP, Specialist, Home health nurse, Urgent Care, ED, 911  Yes   Week 3 call completed?  Yes   Revoked  No further contact(revokes)-requires comment   Is the patient interested in additional calls from an ambulatory ?  NOTE:  applies to high risk patients requiring additional follow-up.  No   Graduated/Revoked comments  Patient reports she is doing great.  No concerns healing well.            Fiordaliza Gibbs RN

## 2021-02-07 DIAGNOSIS — I10 ESSENTIAL HYPERTENSION: ICD-10-CM

## 2021-02-07 DIAGNOSIS — E78.2 MIXED HYPERLIPIDEMIA: ICD-10-CM

## 2021-02-07 RX ORDER — ROSUVASTATIN CALCIUM 10 MG/1
10 TABLET, COATED ORAL NIGHTLY
Qty: 90 TABLET | Refills: 1 | Status: SHIPPED | OUTPATIENT
Start: 2021-02-07 | End: 2021-08-04

## 2021-02-07 NOTE — TELEPHONE ENCOUNTER
Lab Results   Component Value Date    CHOL 374 (H) 01/03/2020    TRIG 311 (H) 01/03/2020    HDL 50 01/03/2020     (H) 01/03/2020     The ASCVD Risk score (Kamimargareth MONTANO Jr., et al., 2013) failed to calculate for the following reasons:    The valid total cholesterol range is 130 to 320 mg/dL

## 2021-03-25 DIAGNOSIS — K44.9 HIATAL HERNIA WITH GERD: ICD-10-CM

## 2021-03-25 DIAGNOSIS — F41.8 MIXED ANXIETY AND DEPRESSIVE DISORDER: ICD-10-CM

## 2021-03-25 DIAGNOSIS — K21.9 HIATAL HERNIA WITH GERD: ICD-10-CM

## 2021-03-25 RX ORDER — SERTRALINE HYDROCHLORIDE 100 MG/1
100 TABLET, FILM COATED ORAL
Qty: 90 TABLET | Refills: 1 | Status: SHIPPED | OUTPATIENT
Start: 2021-03-25 | End: 2021-03-26 | Stop reason: SDUPTHER

## 2021-03-25 RX ORDER — PANTOPRAZOLE SODIUM 40 MG/1
40 TABLET, DELAYED RELEASE ORAL
Qty: 90 TABLET | Refills: 1 | Status: SHIPPED | OUTPATIENT
Start: 2021-03-25 | End: 2021-09-08 | Stop reason: SDUPTHER

## 2021-03-26 ENCOUNTER — OFFICE VISIT (OUTPATIENT)
Dept: FAMILY MEDICINE CLINIC | Facility: CLINIC | Age: 81
End: 2021-03-26

## 2021-03-26 VITALS
SYSTOLIC BLOOD PRESSURE: 130 MMHG | WEIGHT: 184 LBS | HEART RATE: 82 BPM | BODY MASS INDEX: 33.86 KG/M2 | TEMPERATURE: 96.9 F | OXYGEN SATURATION: 93 % | DIASTOLIC BLOOD PRESSURE: 75 MMHG | HEIGHT: 62 IN

## 2021-03-26 DIAGNOSIS — Z12.31 ENCOUNTER FOR SCREENING MAMMOGRAM FOR MALIGNANT NEOPLASM OF BREAST: ICD-10-CM

## 2021-03-26 DIAGNOSIS — E03.9 ACQUIRED HYPOTHYROIDISM: ICD-10-CM

## 2021-03-26 DIAGNOSIS — E78.2 MIXED HYPERLIPIDEMIA: ICD-10-CM

## 2021-03-26 DIAGNOSIS — Z13.820 ENCOUNTER FOR OSTEOPOROSIS SCREENING IN ASYMPTOMATIC POSTMENOPAUSAL PATIENT: ICD-10-CM

## 2021-03-26 DIAGNOSIS — Z99.81 CHRONIC RESPIRATORY FAILURE WITH HYPOXIA, ON HOME O2 THERAPY (HCC): ICD-10-CM

## 2021-03-26 DIAGNOSIS — J96.11 CHRONIC RESPIRATORY FAILURE WITH HYPOXIA, ON HOME O2 THERAPY (HCC): ICD-10-CM

## 2021-03-26 DIAGNOSIS — Z78.0 ENCOUNTER FOR OSTEOPOROSIS SCREENING IN ASYMPTOMATIC POSTMENOPAUSAL PATIENT: ICD-10-CM

## 2021-03-26 DIAGNOSIS — F41.8 MIXED ANXIETY AND DEPRESSIVE DISORDER: ICD-10-CM

## 2021-03-26 DIAGNOSIS — I13.10 HYPERTENSIVE HEART AND CHRONIC KIDNEY DISEASE STAGE 3 (HCC): Primary | ICD-10-CM

## 2021-03-26 DIAGNOSIS — N18.30 HYPERTENSIVE HEART AND CHRONIC KIDNEY DISEASE STAGE 3 (HCC): Primary | ICD-10-CM

## 2021-03-26 PROBLEM — N18.32 HYPERTENSIVE HEART AND KIDNEY DISEASE WITHOUT HEART FAILURE AND WITH STAGE 3B CHRONIC KIDNEY DISEASE: Status: ACTIVE | Noted: 2019-06-25

## 2021-03-26 PROCEDURE — 99214 OFFICE O/P EST MOD 30 MIN: CPT | Performed by: FAMILY MEDICINE

## 2021-03-26 RX ORDER — SERTRALINE HYDROCHLORIDE 100 MG/1
150 TABLET, FILM COATED ORAL
Qty: 135 TABLET | Refills: 1 | Status: SHIPPED | OUTPATIENT
Start: 2021-03-26 | End: 2021-09-08 | Stop reason: SDUPTHER

## 2021-03-26 RX ORDER — BUDESONIDE AND FORMOTEROL FUMARATE DIHYDRATE 160; 4.5 UG/1; UG/1
2 AEROSOL RESPIRATORY (INHALATION)
Qty: 10.2 G | Refills: 5 | Status: SHIPPED | OUTPATIENT
Start: 2021-03-26 | End: 2021-03-26 | Stop reason: SDUPTHER

## 2021-03-26 RX ORDER — MIRTAZAPINE 15 MG/1
15 TABLET, FILM COATED ORAL NIGHTLY
Qty: 90 TABLET | Refills: 1 | Status: SHIPPED | OUTPATIENT
Start: 2021-03-26 | End: 2021-08-16 | Stop reason: SDUPTHER

## 2021-03-26 RX ORDER — BUDESONIDE AND FORMOTEROL FUMARATE DIHYDRATE 160; 4.5 UG/1; UG/1
2 AEROSOL RESPIRATORY (INHALATION)
Qty: 10.2 G | Refills: 5 | Status: SHIPPED | OUTPATIENT
Start: 2021-03-26 | End: 2021-07-22 | Stop reason: DRUGHIGH

## 2021-04-03 DIAGNOSIS — G25.81 RESTLESS LEG SYNDROME: ICD-10-CM

## 2021-04-05 RX ORDER — ROPINIROLE 0.25 MG/1
0.25 TABLET, FILM COATED ORAL NIGHTLY
Qty: 90 TABLET | Refills: 1 | Status: SHIPPED | OUTPATIENT
Start: 2021-04-05 | End: 2021-09-08 | Stop reason: SDUPTHER

## 2021-04-07 PROBLEM — N18.30 HYPERTENSIVE HEART AND CHRONIC KIDNEY DISEASE STAGE 3 (HCC): Status: ACTIVE | Noted: 2019-06-25

## 2021-04-14 ENCOUNTER — HOSPITAL ENCOUNTER (OUTPATIENT)
Dept: MAMMOGRAPHY | Facility: HOSPITAL | Age: 81
Discharge: HOME | End: 2021-04-14

## 2021-04-14 ENCOUNTER — HOSPITAL ENCOUNTER (OUTPATIENT)
Dept: BONE DENSITY | Facility: HOSPITAL | Age: 81
Discharge: HOME | End: 2021-04-14

## 2021-04-14 PROCEDURE — 77063 BREAST TOMOSYNTHESIS BI: CPT

## 2021-04-14 PROCEDURE — 77067 SCR MAMMO BI INCL CAD: CPT

## 2021-04-14 PROCEDURE — 77080 DXA BONE DENSITY AXIAL: CPT

## 2021-05-01 DIAGNOSIS — E55.9 VITAMIN D DEFICIENCY: ICD-10-CM

## 2021-05-03 RX ORDER — ERGOCALCIFEROL 1.25 MG/1
50000 CAPSULE ORAL
Qty: 12 CAPSULE | Refills: 1 | Status: SHIPPED | OUTPATIENT
Start: 2021-05-03 | End: 2021-07-02

## 2021-05-07 DIAGNOSIS — I10 ESSENTIAL HYPERTENSION: ICD-10-CM

## 2021-05-07 RX ORDER — AMLODIPINE BESYLATE 10 MG/1
10 TABLET ORAL
Qty: 90 TABLET | Refills: 1 | Status: SHIPPED | OUTPATIENT
Start: 2021-05-07 | End: 2021-07-02

## 2021-05-08 DIAGNOSIS — C50.111 MALIGNANT NEOPLASM OF CENTRAL PORTION OF RIGHT BREAST IN FEMALE, ESTROGEN RECEPTOR POSITIVE (HCC): Chronic | ICD-10-CM

## 2021-05-08 DIAGNOSIS — Z17.0 MALIGNANT NEOPLASM OF CENTRAL PORTION OF RIGHT BREAST IN FEMALE, ESTROGEN RECEPTOR POSITIVE (HCC): Chronic | ICD-10-CM

## 2021-05-10 RX ORDER — ANASTROZOLE 1 MG/1
TABLET ORAL
Qty: 90 TABLET | Refills: 1 | Status: SHIPPED | OUTPATIENT
Start: 2021-05-10 | End: 2021-09-08 | Stop reason: SDUPTHER

## 2021-05-16 DIAGNOSIS — I10 ESSENTIAL (PRIMARY) HYPERTENSION: ICD-10-CM

## 2021-05-17 RX ORDER — LOSARTAN POTASSIUM 100 MG/1
TABLET ORAL
Qty: 90 TABLET | Refills: 1 | Status: SHIPPED | OUTPATIENT
Start: 2021-05-17 | End: 2021-09-08 | Stop reason: SDUPTHER

## 2021-05-17 RX ORDER — CHLORTHALIDONE 25 MG/1
TABLET ORAL
Qty: 90 TABLET | Refills: 1 | Status: SHIPPED | OUTPATIENT
Start: 2021-05-17 | End: 2021-09-08 | Stop reason: SDUPTHER

## 2021-05-17 NOTE — TELEPHONE ENCOUNTER
BP Readings from Last 3 Encounters:   03/26/21 130/75   01/20/21 129/73   01/13/21 132/44     Lab Results   Component Value Date    CREATININE 1.27 (H) 01/13/2021     Lab Results   Component Value Date    K 4.3 01/13/2021

## 2021-06-24 DIAGNOSIS — K44.9 HIATAL HERNIA WITH GERD: ICD-10-CM

## 2021-06-24 DIAGNOSIS — K21.9 HIATAL HERNIA WITH GERD: ICD-10-CM

## 2021-06-24 RX ORDER — METOCLOPRAMIDE 5 MG/1
5 TABLET ORAL 3 TIMES DAILY PRN
Qty: 270 TABLET | Refills: 1 | Status: SHIPPED | OUTPATIENT
Start: 2021-06-24 | End: 2021-09-08 | Stop reason: SDUPTHER

## 2021-07-02 ENCOUNTER — HOSPITAL ENCOUNTER (INPATIENT)
Facility: HOSPITAL | Age: 81
LOS: 5 days | Discharge: HOME | End: 2021-07-08
Attending: INTERNAL MEDICINE | Admitting: INTERNAL MEDICINE

## 2021-07-02 ENCOUNTER — APPOINTMENT (OUTPATIENT)
Dept: GENERAL RADIOLOGY | Facility: HOSPITAL | Age: 81
End: 2021-07-02
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
## 2021-07-02 DIAGNOSIS — R53.1 GENERAL WEAKNESS: ICD-10-CM

## 2021-07-02 DIAGNOSIS — R09.02 HYPOXIA: ICD-10-CM

## 2021-07-02 DIAGNOSIS — J18.9 PNEUMONIA OF LEFT LUNG DUE TO INFECTIOUS ORGANISM, UNSPECIFIED PART OF LUNG: Primary | ICD-10-CM

## 2021-07-02 LAB
ALBUMIN SERPL-MCNC: 3.6 G/DL (ref 3.5–5.2)
ALBUMIN/GLOB SERPL: 1.1 G/DL
ALP SERPL-CCNC: 109 U/L (ref 39–117)
ALT SERPL W P-5'-P-CCNC: 12 U/L (ref 1–33)
ANION GAP SERPL CALCULATED.3IONS-SCNC: 14 MMOL/L (ref 5–15)
AST SERPL-CCNC: 28 U/L (ref 1–32)
B PARAPERT DNA SPEC QL NAA+PROBE: NOT DETECTED
B PERT DNA SPEC QL NAA+PROBE: NOT DETECTED
BACTERIA UR QL AUTO: ABNORMAL /HPF
BILIRUB SERPL-MCNC: 0.5 MG/DL (ref 0–1.2)
BILIRUB UR QL STRIP: NEGATIVE
BUN SERPL-MCNC: 30 MG/DL (ref 8–23)
BUN/CREAT SERPL: 22.1 (ref 7–25)
C PNEUM DNA NPH QL NAA+NON-PROBE: NOT DETECTED
CALCIUM SPEC-SCNC: 8.2 MG/DL (ref 8.6–10.5)
CHLORIDE SERPL-SCNC: 100 MMOL/L (ref 98–107)
CK SERPL-CCNC: 241 U/L (ref 20–180)
CLARITY UR: CLEAR
CO2 SERPL-SCNC: 19 MMOL/L (ref 22–29)
COLOR UR: YELLOW
CREAT SERPL-MCNC: 1.36 MG/DL (ref 0.57–1)
D-LACTATE SERPL-SCNC: 0.6 MMOL/L (ref 0.5–2)
DEPRECATED RDW RBC AUTO: 44.6 FL (ref 37–54)
ERYTHROCYTE [DISTWIDTH] IN BLOOD BY AUTOMATED COUNT: 14.9 % (ref 12.3–15.4)
FLUAV SUBTYP SPEC NAA+PROBE: NOT DETECTED
FLUBV RNA ISLT QL NAA+PROBE: NOT DETECTED
GFR SERPL CREATININE-BSD FRML MDRD: 37 ML/MIN/1.73
GLOBULIN UR ELPH-MCNC: 3.2 GM/DL
GLUCOSE SERPL-MCNC: 162 MG/DL (ref 65–99)
GLUCOSE UR STRIP-MCNC: NEGATIVE MG/DL
HADV DNA SPEC NAA+PROBE: NOT DETECTED
HCOV 229E RNA SPEC QL NAA+PROBE: NOT DETECTED
HCOV HKU1 RNA SPEC QL NAA+PROBE: NOT DETECTED
HCOV NL63 RNA SPEC QL NAA+PROBE: NOT DETECTED
HCOV OC43 RNA SPEC QL NAA+PROBE: NOT DETECTED
HCT VFR BLD AUTO: 32.9 % (ref 34–46.6)
HGB BLD-MCNC: 10.5 G/DL (ref 12–15.9)
HGB UR QL STRIP.AUTO: ABNORMAL
HMPV RNA NPH QL NAA+NON-PROBE: NOT DETECTED
HOLD SPECIMEN: NORMAL
HPIV1 RNA SPEC QL NAA+PROBE: NOT DETECTED
HPIV2 RNA SPEC QL NAA+PROBE: NOT DETECTED
HPIV3 RNA NPH QL NAA+PROBE: NOT DETECTED
HPIV4 P GENE NPH QL NAA+PROBE: NOT DETECTED
HYALINE CASTS UR QL AUTO: ABNORMAL /LPF
KETONES UR QL STRIP: ABNORMAL
LEUKOCYTE ESTERASE UR QL STRIP.AUTO: ABNORMAL
LIPASE SERPL-CCNC: 12 U/L (ref 13–60)
LYMPHOCYTES # BLD MANUAL: 16.02 10*3/MM3 (ref 0.7–3.1)
LYMPHOCYTES NFR BLD MANUAL: 1 % (ref 5–12)
LYMPHOCYTES NFR BLD MANUAL: 56 % (ref 19.6–45.3)
M PNEUMO IGG SER IA-ACNC: NOT DETECTED
MCH RBC QN AUTO: 27.9 PG (ref 26.6–33)
MCHC RBC AUTO-ENTMCNC: 32 G/DL (ref 31.5–35.7)
MCV RBC AUTO: 87.3 FL (ref 79–97)
MONOCYTES # BLD AUTO: 0.29 10*3/MM3 (ref 0.1–0.9)
MYELOCYTES NFR BLD MANUAL: 1 % (ref 0–0)
NEUTROPHILS # BLD AUTO: 12.01 10*3/MM3 (ref 1.7–7)
NEUTROPHILS NFR BLD MANUAL: 42 % (ref 42.7–76)
NITRITE UR QL STRIP: NEGATIVE
NT-PROBNP SERPL-MCNC: 1231 PG/ML (ref 0–1800)
PATHOLOGY REVIEW: YES
PH UR STRIP.AUTO: 5.5 [PH] (ref 5–8)
PLAT MORPH BLD: NORMAL
PLATELET # BLD AUTO: 161 10*3/MM3 (ref 140–450)
PMV BLD AUTO: 9.7 FL (ref 6–12)
POTASSIUM SERPL-SCNC: 3.7 MMOL/L (ref 3.5–5.2)
PROT SERPL-MCNC: 6.8 G/DL (ref 6–8.5)
PROT UR QL STRIP: ABNORMAL
RBC # BLD AUTO: 3.77 10*6/MM3 (ref 3.77–5.28)
RBC # UR: ABNORMAL /HPF
RBC MORPH BLD: NORMAL
REF LAB TEST METHOD: ABNORMAL
RHINOVIRUS RNA SPEC NAA+PROBE: NOT DETECTED
RSV RNA NPH QL NAA+NON-PROBE: NOT DETECTED
SARS-COV-2 RNA NPH QL NAA+NON-PROBE: NOT DETECTED
SCAN SLIDE: NORMAL
SODIUM SERPL-SCNC: 133 MMOL/L (ref 136–145)
SP GR UR STRIP: 1.02 (ref 1–1.03)
SQUAMOUS #/AREA URNS HPF: ABNORMAL /HPF
TROPONIN T SERPL-MCNC: <0.01 NG/ML (ref 0–0.03)
UROBILINOGEN UR QL STRIP: ABNORMAL
WBC # BLD AUTO: 28.6 10*3/MM3 (ref 3.4–10.8)
WBC MORPH BLD: NORMAL
WBC UR QL AUTO: ABNORMAL /HPF

## 2021-07-02 PROCEDURE — 99284 EMERGENCY DEPT VISIT MOD MDM: CPT

## 2021-07-02 PROCEDURE — 85025 COMPLETE CBC W/AUTO DIFF WBC: CPT | Performed by: NURSE PRACTITIONER

## 2021-07-02 PROCEDURE — 81001 URINALYSIS AUTO W/SCOPE: CPT | Performed by: NURSE PRACTITIONER

## 2021-07-02 PROCEDURE — 85007 BL SMEAR W/DIFF WBC COUNT: CPT | Performed by: NURSE PRACTITIONER

## 2021-07-02 PROCEDURE — 87040 BLOOD CULTURE FOR BACTERIA: CPT | Performed by: NURSE PRACTITIONER

## 2021-07-02 PROCEDURE — P9612 CATHETERIZE FOR URINE SPEC: HCPCS

## 2021-07-02 PROCEDURE — 93005 ELECTROCARDIOGRAM TRACING: CPT

## 2021-07-02 PROCEDURE — 87186 SC STD MICRODIL/AGAR DIL: CPT | Performed by: NURSE PRACTITIONER

## 2021-07-02 PROCEDURE — 83880 ASSAY OF NATRIURETIC PEPTIDE: CPT | Performed by: NURSE PRACTITIONER

## 2021-07-02 PROCEDURE — 99222 1ST HOSP IP/OBS MODERATE 55: CPT | Performed by: NURSE PRACTITIONER

## 2021-07-02 PROCEDURE — 93005 ELECTROCARDIOGRAM TRACING: CPT | Performed by: INTERNAL MEDICINE

## 2021-07-02 PROCEDURE — 80053 COMPREHEN METABOLIC PANEL: CPT | Performed by: NURSE PRACTITIONER

## 2021-07-02 PROCEDURE — 0202U NFCT DS 22 TRGT SARS-COV-2: CPT | Performed by: NURSE PRACTITIONER

## 2021-07-02 PROCEDURE — 71045 X-RAY EXAM CHEST 1 VIEW: CPT

## 2021-07-02 PROCEDURE — 87086 URINE CULTURE/COLONY COUNT: CPT | Performed by: NURSE PRACTITIONER

## 2021-07-02 PROCEDURE — 84484 ASSAY OF TROPONIN QUANT: CPT | Performed by: NURSE PRACTITIONER

## 2021-07-02 PROCEDURE — 87147 CULTURE TYPE IMMUNOLOGIC: CPT | Performed by: NURSE PRACTITIONER

## 2021-07-02 PROCEDURE — 25010000002 FUROSEMIDE PER 20 MG: Performed by: NURSE PRACTITIONER

## 2021-07-02 PROCEDURE — 94640 AIRWAY INHALATION TREATMENT: CPT

## 2021-07-02 PROCEDURE — 83690 ASSAY OF LIPASE: CPT | Performed by: NURSE PRACTITIONER

## 2021-07-02 PROCEDURE — 25010000002 ENOXAPARIN PER 10 MG: Performed by: NURSE PRACTITIONER

## 2021-07-02 PROCEDURE — 87150 DNA/RNA AMPLIFIED PROBE: CPT | Performed by: NURSE PRACTITIONER

## 2021-07-02 PROCEDURE — G0378 HOSPITAL OBSERVATION PER HR: HCPCS

## 2021-07-02 PROCEDURE — 82550 ASSAY OF CK (CPK): CPT | Performed by: NURSE PRACTITIONER

## 2021-07-02 PROCEDURE — 83605 ASSAY OF LACTIC ACID: CPT

## 2021-07-02 PROCEDURE — 25010000002 CEFTRIAXONE PER 250 MG: Performed by: NURSE PRACTITIONER

## 2021-07-02 RX ORDER — ACETAMINOPHEN 650 MG/1
650 SUPPOSITORY RECTAL EVERY 4 HOURS PRN
Status: DISCONTINUED | OUTPATIENT
Start: 2021-07-02 | End: 2021-07-08 | Stop reason: HOSPADM

## 2021-07-02 RX ORDER — ONDANSETRON 4 MG/1
4 TABLET, FILM COATED ORAL EVERY 6 HOURS PRN
Status: DISCONTINUED | OUTPATIENT
Start: 2021-07-02 | End: 2021-07-08 | Stop reason: HOSPADM

## 2021-07-02 RX ORDER — ACETAMINOPHEN 325 MG/1
650 TABLET ORAL EVERY 4 HOURS PRN
Status: DISCONTINUED | OUTPATIENT
Start: 2021-07-02 | End: 2021-07-08 | Stop reason: HOSPADM

## 2021-07-02 RX ORDER — ACETAMINOPHEN 500 MG
1000 TABLET ORAL ONCE
Status: COMPLETED | OUTPATIENT
Start: 2021-07-02 | End: 2021-07-02

## 2021-07-02 RX ORDER — FUROSEMIDE 10 MG/ML
40 INJECTION INTRAMUSCULAR; INTRAVENOUS ONCE
Status: COMPLETED | OUTPATIENT
Start: 2021-07-02 | End: 2021-07-02

## 2021-07-02 RX ORDER — ALBUTEROL SULFATE 90 UG/1
2 AEROSOL, METERED RESPIRATORY (INHALATION) ONCE
Status: COMPLETED | OUTPATIENT
Start: 2021-07-02 | End: 2021-07-02

## 2021-07-02 RX ORDER — SODIUM CHLORIDE 9 MG/ML
50 INJECTION, SOLUTION INTRAVENOUS CONTINUOUS
Status: DISCONTINUED | OUTPATIENT
Start: 2021-07-02 | End: 2021-07-08

## 2021-07-02 RX ORDER — IPRATROPIUM BROMIDE AND ALBUTEROL SULFATE 2.5; .5 MG/3ML; MG/3ML
3 SOLUTION RESPIRATORY (INHALATION) EVERY 4 HOURS PRN
Status: DISCONTINUED | OUTPATIENT
Start: 2021-07-02 | End: 2021-07-07

## 2021-07-02 RX ORDER — LEVOTHYROXINE SODIUM 0.1 MG/1
100 TABLET ORAL DAILY
COMMUNITY
End: 2021-09-08 | Stop reason: SDUPTHER

## 2021-07-02 RX ORDER — BENZONATATE 100 MG/1
100 CAPSULE ORAL 3 TIMES DAILY PRN
Status: DISCONTINUED | OUTPATIENT
Start: 2021-07-02 | End: 2021-07-08 | Stop reason: HOSPADM

## 2021-07-02 RX ORDER — SODIUM CHLORIDE 0.9 % (FLUSH) 0.9 %
10 SYRINGE (ML) INJECTION AS NEEDED
Status: DISCONTINUED | OUTPATIENT
Start: 2021-07-02 | End: 2021-07-08 | Stop reason: HOSPADM

## 2021-07-02 RX ORDER — ALBUTEROL SULFATE 90 UG/1
2 AEROSOL, METERED RESPIRATORY (INHALATION) EVERY 4 HOURS PRN
Status: DISCONTINUED | OUTPATIENT
Start: 2021-07-02 | End: 2021-07-03

## 2021-07-02 RX ORDER — IPRATROPIUM BROMIDE AND ALBUTEROL SULFATE 2.5; .5 MG/3ML; MG/3ML
3 SOLUTION RESPIRATORY (INHALATION) EVERY 4 HOURS PRN
COMMUNITY
End: 2021-10-25 | Stop reason: SDUPTHER

## 2021-07-02 RX ORDER — SODIUM CHLORIDE 0.9 % (FLUSH) 0.9 %
3 SYRINGE (ML) INJECTION EVERY 12 HOURS SCHEDULED
Status: DISCONTINUED | OUTPATIENT
Start: 2021-07-02 | End: 2021-07-08 | Stop reason: HOSPADM

## 2021-07-02 RX ORDER — SODIUM CHLORIDE 0.9 % (FLUSH) 0.9 %
3-10 SYRINGE (ML) INJECTION AS NEEDED
Status: DISCONTINUED | OUTPATIENT
Start: 2021-07-02 | End: 2021-07-08 | Stop reason: HOSPADM

## 2021-07-02 RX ORDER — CHOLECALCIFEROL (VITAMIN D3) 125 MCG
5 CAPSULE ORAL NIGHTLY PRN
Status: DISCONTINUED | OUTPATIENT
Start: 2021-07-02 | End: 2021-07-08 | Stop reason: HOSPADM

## 2021-07-02 RX ORDER — ONDANSETRON 2 MG/ML
4 INJECTION INTRAMUSCULAR; INTRAVENOUS EVERY 6 HOURS PRN
Status: DISCONTINUED | OUTPATIENT
Start: 2021-07-02 | End: 2021-07-08 | Stop reason: HOSPADM

## 2021-07-02 RX ORDER — DOXYCYCLINE 100 MG/1
100 TABLET ORAL EVERY 12 HOURS SCHEDULED
Status: DISCONTINUED | OUTPATIENT
Start: 2021-07-03 | End: 2021-07-03

## 2021-07-02 RX ORDER — AMLODIPINE BESYLATE 10 MG/1
10 TABLET ORAL DAILY
COMMUNITY
End: 2021-09-08 | Stop reason: SDUPTHER

## 2021-07-02 RX ORDER — NITROGLYCERIN 0.4 MG/1
0.4 TABLET SUBLINGUAL
Status: DISCONTINUED | OUTPATIENT
Start: 2021-07-02 | End: 2021-07-08 | Stop reason: HOSPADM

## 2021-07-02 RX ORDER — GUAIFENESIN 600 MG/1
600 TABLET, EXTENDED RELEASE ORAL EVERY 12 HOURS SCHEDULED
Status: DISCONTINUED | OUTPATIENT
Start: 2021-07-02 | End: 2021-07-03

## 2021-07-02 RX ORDER — ACETAMINOPHEN 160 MG/5ML
650 SOLUTION ORAL EVERY 4 HOURS PRN
Status: DISCONTINUED | OUTPATIENT
Start: 2021-07-02 | End: 2021-07-08 | Stop reason: HOSPADM

## 2021-07-02 RX ADMIN — SODIUM CHLORIDE 50 ML/HR: 9 INJECTION, SOLUTION INTRAVENOUS at 22:51

## 2021-07-02 RX ADMIN — SODIUM CHLORIDE 1000 ML: 9 INJECTION, SOLUTION INTRAVENOUS at 18:26

## 2021-07-02 RX ADMIN — GUAIFENESIN 600 MG: 600 TABLET, EXTENDED RELEASE ORAL at 22:18

## 2021-07-02 RX ADMIN — DOXYCYCLINE 100 MG: 100 INJECTION, POWDER, LYOPHILIZED, FOR SOLUTION INTRAVENOUS at 22:14

## 2021-07-02 RX ADMIN — ACETAMINOPHEN 1000 MG: 500 TABLET, FILM COATED ORAL at 18:25

## 2021-07-02 RX ADMIN — WATER 1 G: 1000 INJECTION, SOLUTION INTRAVENOUS at 20:04

## 2021-07-02 RX ADMIN — ENOXAPARIN SODIUM 40 MG: 40 INJECTION SUBCUTANEOUS at 22:51

## 2021-07-02 RX ADMIN — ALBUTEROL SULFATE 2 PUFF: 108 AEROSOL, METERED RESPIRATORY (INHALATION) at 20:33

## 2021-07-02 RX ADMIN — FUROSEMIDE 40 MG: 10 INJECTION, SOLUTION INTRAMUSCULAR; INTRAVENOUS at 22:13

## 2021-07-02 NOTE — ED PROVIDER NOTES
Subjective   History: She is a 81-year-old female complains of general weakness reports she had a fall last night when she laid on for for 2 hours and she had another fall today laid in the floor for approximately 90 minutes.  She lives at home with her  when he was not home when she fell.  Denies hitting her head or loss of consciousness.  Denies blood thinners.  Reports she wears home O2 2 L nasal cannula at night only has not had to decrease her oxygen during the day.  History of MAC infection last hospitalization was beginning of 2021 for hernia repair no recent antibiotics within the last 3 months.  Denies fever chills nausea vomiting chest pain  has some chronic abdominal discomfort with removal of lipoma right side abdomen denies diarrhea constipation, reports some dark stool but takes iron daily.  Last colonoscopy approximately 3 years ago reports no abnormality.      Onset: 1 day  Location: Generalized  Duration: Constant  Character: Weakness  Aggravating/Alleviating factors: None  Radiation not applicable  Severity: Moderate            Review of Systems   Constitutional: Positive for fatigue. Negative for chills and fever.   HENT: Negative for congestion, sore throat, tinnitus and trouble swallowing.    Eyes: Negative for photophobia, discharge and visual disturbance.   Respiratory: Positive for cough and shortness of breath.    Cardiovascular: Negative for chest pain, palpitations and leg swelling.   Gastrointestinal: Positive for abdominal pain. Negative for diarrhea, nausea and vomiting.   Genitourinary: Negative for dysuria, frequency and urgency.   Musculoskeletal: Negative for back pain and myalgias.   Skin: Negative for rash.   Neurological: Positive for weakness. Negative for dizziness and headaches.   Psychiatric/Behavioral: Negative for confusion.       Past Medical History:   Diagnosis Date   • Acute bronchitis due to human metapneumovirus 2/8/2020   • Anxiety disorder    • Arthritis    •  Breast cancer (CMS/[facility]) 02/2019    Invasive ductal carcinoma   • Chronic lymphocytic leukemia (CLL), B-cell (CMS/[facility]) 01/2019   • Depression    • Disease of thyroid gland    • Diverticulosis of colon 2018    Identified on colonoscopy   • Dysphagia 12/2020   • Dyspnea on exertion    • Hemorrhoid    • Hiatal hernia 12/2020   • Hiatal hernia with GERD     large hiatal hernia with high-grade reflux on barium swallow; s/p laparoscopic fundoplication with gastropexy   • History of diabetes mellitus     no meds now   • Hyperlipidemia    • Hypertension    • Mycobacterium avium complex (CMS/[facility]) 02/2019    aspiration pneumonia; completed abx therapy   • OAB (overactive bladder)    • Sleep apnea     daughter states pt does not have and doesn't have machine       No Known Allergies    Past Surgical History:   Procedure Laterality Date   • BLADDER SURGERY     • BRONCHOSCOPY N/A 9/13/2019    Procedure: BRONCHOSCOPY with bronchial washing;  Surgeon: Marnie Frankel MD;  Location: [facility] ENDOSCOPY;  Service: Pulmonary   • COLONOSCOPY  07/19/2018    severe diverticulosis   • CYST REMOVAL      Removed a fatty cyst off of her back   • ENDOSCOPY N/A 11/23/2020    Procedure: ESOPHAGOGASTRODUODENOSCOPY with dilatation (Bougie # 48, 50, 52, 54, 56, 58);  Surgeon: Den Plummer DO;  Location: [facility] ENDOSCOPY;  Service: General;  Laterality: N/A;  Post: large hiatal hernia, joshua's ulcers   • HIATAL HERNIA REPAIR N/A 12/30/2020    Procedure: Laparoscopic hiatal hernia repair with gastropexy;  Surgeon: Den Plummer DO;  Location: [facility] MAIN OR;  Service: General;  Laterality: N/A;   • MASTECTOMY Right 02/04/2019    Invasive ductal carcinoma   • TUBAL ABDOMINAL LIGATION         Family History   Problem Relation Age of Onset   • Diabetes Mother    • Arthritis Other    • Heart disease Other        Social History     Socioeconomic History   • Marital status:      Spouse name: Not on file   • Number of children: Not on file   •  Years of education: Not on file   • Highest education level: Not on file   Tobacco Use   • Smoking status: Former Smoker   • Smokeless tobacco: Never Used   Vaping Use   • Vaping Use: Never used   Substance and Sexual Activity   • Alcohol use: No   • Drug use: No   • Sexual activity: Defer           Objective   Physical Exam  Vitals reviewed.   Constitutional:       General: She is not in acute distress.     Appearance: She is normal weight. She is not toxic-appearing.   HENT:      Head: Normocephalic and atraumatic.      Right Ear: Tympanic membrane normal.      Left Ear: Tympanic membrane normal.      Nose: Nose normal.      Comments: Abrasion to nasal bridge no erythema edema nontender no deformity.     Mouth/Throat:      Mouth: Mucous membranes are moist.      Pharynx: Oropharynx is clear.   Eyes:      Extraocular Movements: Extraocular movements intact.      Pupils: Pupils are equal, round, and reactive to light.   Neck:      Comments: There was no midline tenderness on firm palpation to cervical thoracic or lumbar spine.  No crepitus step-off no surface trauma no erythema edema.  Cardiovascular:      Rate and Rhythm: Tachycardia present.      Pulses: Normal pulses.      Heart sounds: Normal heart sounds. No murmur heard.     Pulmonary:      Effort: Pulmonary effort is normal.      Breath sounds: Decreased breath sounds present.   Abdominal:      General: Abdomen is protuberant. Bowel sounds are normal.      Palpations: Abdomen is soft.      Tenderness: There is no abdominal tenderness. There is no guarding or rebound.      Comments: Patient reports a history of abdominal discomfort, baseline.  Reports no worse than normal.  History of lipoma removal right side abdomen.   Musculoskeletal:         General: No swelling, tenderness or deformity.      Cervical back: Normal range of motion and neck supple. No tenderness.      Comments: Abrasion right elbow and right knee.  No erythema edema nontender on palpation  "full range of motion intact.   Skin:     General: Skin is warm and dry.      Findings: No rash.   Neurological:      Mental Status: She is alert and oriented to person, place, and time.   Psychiatric:         Mood and Affect: Mood normal.         Behavior: Behavior normal.         Thought Content: Thought content normal.         Judgment: Judgment normal.         Procedures           ED Course    /57 (BP Location: Left arm, Patient Position: Lying)   Pulse 102   Temp (!) 100.9 °F (38.3 °C) (Oral)   Resp 20   Ht 157.5 cm (62\")   Wt 81.6 kg (180 lb)   SpO2 (!) 87%   BMI 32.92 kg/m²   Labs Reviewed   COMPREHENSIVE METABOLIC PANEL - Abnormal; Notable for the following components:       Result Value    Glucose 162 (*)     BUN 30 (*)     Creatinine 1.36 (*)     Sodium 133 (*)     CO2 19.0 (*)     Calcium 8.2 (*)     eGFR Non  Amer 37 (*)     All other components within normal limits    Narrative:     GFR Normal >60  Chronic Kidney Disease <60  Kidney Failure <15     LIPASE - Abnormal; Notable for the following components:    Lipase 12 (*)     All other components within normal limits   CK - Abnormal; Notable for the following components:    Creatine Kinase 241 (*)     All other components within normal limits   CBC WITH AUTO DIFFERENTIAL - Abnormal; Notable for the following components:    WBC 28.60 (*)     Hemoglobin 10.5 (*)     Hematocrit 32.9 (*)     All other components within normal limits   BNP (IN-HOUSE) - Normal    Narrative:     Among patients with dyspnea, NT-proBNP is highly sensitive for the detection of acute congestive heart failure. In addition NT-proBNP of <300 pg/ml effectively rules out acute congestive heart failure with 99% negative predictive value.    Results may be falsely decreased if patient taking Biotin.     TROPONIN (IN-HOUSE) - Normal    Narrative:     Troponin T Reference Range:  <= 0.03 ng/mL-   Negative for AMI  >0.03 ng/mL-     Abnormal for myocardial necrosis.  " Clinicians would have to utilize clinical acumen, EKG, Troponin and serial changes to determine if it is an Acute Myocardial Infarction or myocardial injury due to an underlying chronic condition.       Results may be falsely decreased if patient taking Biotin.     POC LACTATE - Normal   BLOOD CULTURE   BLOOD CULTURE   RESPIRATORY PANEL PCR W/ COVID-19 (SARS-COV-2) LESLEE/VIVIANE/PAVAN/PAD/COR/MAD/JUAN DAVID IN-HOUSE, NP SWAB IN UTM/VTP, 3-4 HR TAT   URINALYSIS W/ CULTURE IF INDICATED   SCAN SLIDE   POC LACTATE   POCT OCCULT BLOOD STOOL   EXTRA TUBES    Narrative:     The following orders were created for panel order Extra Tubes.  Procedure                               Abnormality         Status                     ---------                               -----------         ------                     Green Top (Gel)[[id number]]                                  Final result                 Please view results for these tests on the individual orders.   GREEN TOP   CBC AND DIFFERENTIAL    Narrative:     The following orders were created for panel order CBC & Differential.  Procedure                               Abnormality         Status                     ---------                               -----------         ------                     Scan Slide[[id number]]                                       In process                 CBC Auto Differential[[id number]]        Abnormal            Preliminary result           Please view results for these tests on the individual orders.     Medications   sodium chloride 0.9 % flush 10 mL (has no administration in time range)   cefTRIAXone (ROCEPHIN) in SWFI 1 gram/10ml IV PUSH syringe (has no administration in time range)   albuterol sulfate HFA (PROVENTIL HFA;VENTOLIN HFA;PROAIR HFA) inhaler 2 puff (has no administration in time range)   sodium chloride 0.9 % bolus 1,000 mL (1,000 mL Intravenous New Bag 7/2/21 1826)   acetaminophen (TYLENOL) tablet 1,000 mg (1,000 mg Oral Given 7/2/21 1825)      XR Chest 1 View    Result Date: 7/2/2021  Increased left lower lung airspace opacities, which may be due to atelectasis and/or pneumonia.  Electronically Signed By-Jacquie Arroyo MD On:7/2/2021 5:58 PM This report was finalized on [id number] by  Jacquie Arroyo MD.                                           MDM     I examined the patient using the appropriate personal protective equipment.      DISPOSITION:   Chart Review: 12/30/2020 hiatal hernia with reflux  Comorbidity:  has a past medical history of Acute bronchitis due to human metapneumovirus (2/8/2020), Anxiety disorder, Arthritis, Breast cancer (CMS/[facility]) (02/2019), Chronic lymphocytic leukemia (CLL), B-cell (CMS/[facility]) (01/2019), Depression, Disease of thyroid gland, Diverticulosis of colon (2018), Dysphagia (12/2020), Dyspnea on exertion, Hemorrhoid, Hiatal hernia (12/2020), Hiatal hernia with GERD, History of diabetes mellitus, Hyperlipidemia, Hypertension, Mycobacterium avium complex (CMS/[facility]) (02/2019), OAB (overactive bladder), and Sleep apnea.  Differentials:this list is not all inclusive and does not constitute the entirety of considered causes --> pneumonia UTI viral syndrome rhabdomyolysis  ECG: interpreted by ER physician sinus tach rate 104 no ST elevation or ectopy compared to previous EKG 12/30/2020 sinus rhythm rate 71 and reviewed by myself:   Labs: CBC WBC 28.6 metabolic panel glucose 162 BUN 30 creatinine 1.36 sodium 133 CO2 19 calcium 8.2 GFR 37.  Troponin negative BNP 1231  lipase 12 lactic 0.6    Imaging: Was interpreted by physician and reviewed by myself:  XR Chest 1 View    Result Date: 7/2/2021  Increased left lower lung airspace opacities, which may be due to atelectasis and/or pneumonia.  Electronically Signed By-Jacquie Arroyo MD On:7/2/2021 5:58 PM This report was finalized on [id number] by  Jacquie Arroyo MD.      Disposition/Treatment:    IV initiated blood drawn urine collected chest x-ray EKG.  Initial evaluation  of patient she had a fever 100.9 O2 sat on room air upon entry to ER was 87%.  She looks as though she does not feel well.  She was given Tylenol 1 g 1 L normal saline.    Blood work shows white count of 28.6 BUN 30 creatinine 1.36 GFR 37 however this is not far off from patient's baseline.  Within the last 5 months patient BUN is 124 creatinine 1.27 GFR 41.  CK slight elevation to 41 rhabdomyolysis not likely.  Lipase 12 troponin negative BNP 1231 and had no previous to compare. Ordered lasix.  Lactate 0.6.  Chest x-ray shows increased left lower lung airspace opacity atelectasis versus pneumonia.  Rocephin 1 g ordered as well as albuterol inhaler.  Pending respiratory panel and urinalysis.  Patient be admitted for IV antibiotics for pneumonia as well as O2 therapy and breathing treatments.  Blood cultures x2 pending.    Discussed results with patient and family at bedside verbalized understanding and agreeable plan of care.  Patient will be admitted to hospitalist group.  Spoke with JOVANI Chen, agreeable to admit.    Final diagnoses:   Pneumonia of left lung due to infectious organism, unspecified part of lung   General weakness   Fall, initial encounter       ED Disposition  ED Disposition     ED Disposition Condition Comment    Decision to Admit            No follow-up provider specified.       Medication List      No changes were made to your prescriptions during this visit.          Karie Pardo, APRN  07/02/21 2001

## 2021-07-03 PROBLEM — N18.30 CKD (CHRONIC KIDNEY DISEASE) STAGE 3, GFR 30-59 ML/MIN: Status: ACTIVE | Noted: 2021-07-03

## 2021-07-03 PROBLEM — N39.0 ACUTE UTI (URINARY TRACT INFECTION): Status: ACTIVE | Noted: 2021-07-03

## 2021-07-03 PROBLEM — D72.829 LEUKOCYTOSIS: Status: ACTIVE | Noted: 2021-07-03

## 2021-07-03 LAB
ANION GAP SERPL CALCULATED.3IONS-SCNC: 15 MMOL/L (ref 5–15)
BACTERIA BLD CULT: ABNORMAL
BOTTLE TYPE: ABNORMAL
BUN SERPL-MCNC: 31 MG/DL (ref 8–23)
BUN/CREAT SERPL: 22.3 (ref 7–25)
CA-I SERPL ISE-MCNC: 1.11 MMOL/L (ref 1.2–1.3)
CALCIUM SPEC-SCNC: 8.1 MG/DL (ref 8.6–10.5)
CHLORIDE SERPL-SCNC: 100 MMOL/L (ref 98–107)
CO2 SERPL-SCNC: 20 MMOL/L (ref 22–29)
CREAT SERPL-MCNC: 1.39 MG/DL (ref 0.57–1)
D-LACTATE SERPL-SCNC: 1.2 MMOL/L (ref 0.5–2)
DEPRECATED RDW RBC AUTO: 44.2 FL (ref 37–54)
ERYTHROCYTE [DISTWIDTH] IN BLOOD BY AUTOMATED COUNT: 14.8 % (ref 12.3–15.4)
GFR SERPL CREATININE-BSD FRML MDRD: 36 ML/MIN/1.73
GLUCOSE SERPL-MCNC: 141 MG/DL (ref 65–99)
HCT VFR BLD AUTO: 31.3 % (ref 34–46.6)
HGB BLD-MCNC: 9.9 G/DL (ref 12–15.9)
L PNEUMO1 AG UR QL IA: NEGATIVE
LYMPHOCYTES # BLD MANUAL: 16.07 10*3/MM3 (ref 0.7–3.1)
LYMPHOCYTES NFR BLD MANUAL: 2 % (ref 5–12)
LYMPHOCYTES NFR BLD MANUAL: 56 % (ref 19.6–45.3)
MCH RBC QN AUTO: 27.4 PG (ref 26.6–33)
MCHC RBC AUTO-ENTMCNC: 31.8 G/DL (ref 31.5–35.7)
MCV RBC AUTO: 86.1 FL (ref 79–97)
MONOCYTES # BLD AUTO: 0.57 10*3/MM3 (ref 0.1–0.9)
MRSA DNA SPEC QL NAA+PROBE: NORMAL
NEUTROPHILS # BLD AUTO: 12.05 10*3/MM3 (ref 1.7–7)
NEUTROPHILS NFR BLD MANUAL: 37 % (ref 42.7–76)
NEUTS BAND NFR BLD MANUAL: 5 % (ref 0–5)
PLATELET # BLD AUTO: 168 10*3/MM3 (ref 140–450)
PMV BLD AUTO: 9.7 FL (ref 6–12)
POTASSIUM SERPL-SCNC: 3.4 MMOL/L (ref 3.5–5.2)
RBC # BLD AUTO: 3.63 10*6/MM3 (ref 3.77–5.28)
RBC MORPH BLD: NORMAL
S PNEUM AG SPEC QL LA: NEGATIVE
SCAN SLIDE: NORMAL
SMALL PLATELETS BLD QL SMEAR: ADEQUATE
SODIUM SERPL-SCNC: 135 MMOL/L (ref 136–145)
WBC # BLD AUTO: 28.7 10*3/MM3 (ref 3.4–10.8)
WBC MORPH BLD: NORMAL

## 2021-07-03 PROCEDURE — 80048 BASIC METABOLIC PNL TOTAL CA: CPT | Performed by: NURSE PRACTITIONER

## 2021-07-03 PROCEDURE — 94799 UNLISTED PULMONARY SVC/PX: CPT

## 2021-07-03 PROCEDURE — 83605 ASSAY OF LACTIC ACID: CPT | Performed by: NURSE PRACTITIONER

## 2021-07-03 PROCEDURE — 99232 SBSQ HOSP IP/OBS MODERATE 35: CPT | Performed by: INTERNAL MEDICINE

## 2021-07-03 PROCEDURE — 87641 MR-STAPH DNA AMP PROBE: CPT | Performed by: INTERNAL MEDICINE

## 2021-07-03 PROCEDURE — 25010000002 PIPERACILLIN SOD-TAZOBACTAM PER 1 G: Performed by: INTERNAL MEDICINE

## 2021-07-03 PROCEDURE — 36415 COLL VENOUS BLD VENIPUNCTURE: CPT | Performed by: NURSE PRACTITIONER

## 2021-07-03 PROCEDURE — 82330 ASSAY OF CALCIUM: CPT | Performed by: NURSE PRACTITIONER

## 2021-07-03 PROCEDURE — 94640 AIRWAY INHALATION TREATMENT: CPT

## 2021-07-03 PROCEDURE — 87205 SMEAR GRAM STAIN: CPT | Performed by: INTERNAL MEDICINE

## 2021-07-03 PROCEDURE — 85025 COMPLETE CBC W/AUTO DIFF WBC: CPT | Performed by: NURSE PRACTITIONER

## 2021-07-03 PROCEDURE — 25010000002 ENOXAPARIN PER 10 MG: Performed by: NURSE PRACTITIONER

## 2021-07-03 PROCEDURE — 25010000002 CALCIUM GLUCONATE 2-0.675 GM/100ML-% SOLUTION: Performed by: INTERNAL MEDICINE

## 2021-07-03 PROCEDURE — 85007 BL SMEAR W/DIFF WBC COUNT: CPT | Performed by: NURSE PRACTITIONER

## 2021-07-03 PROCEDURE — 87899 AGENT NOS ASSAY W/OPTIC: CPT | Performed by: INTERNAL MEDICINE

## 2021-07-03 PROCEDURE — 25010000002 ONDANSETRON PER 1 MG: Performed by: NURSE PRACTITIONER

## 2021-07-03 PROCEDURE — 87070 CULTURE OTHR SPECIMN AEROBIC: CPT | Performed by: INTERNAL MEDICINE

## 2021-07-03 RX ORDER — ANASTROZOLE 1 MG/1
1 TABLET ORAL DAILY
Status: DISCONTINUED | OUTPATIENT
Start: 2021-07-03 | End: 2021-07-08 | Stop reason: HOSPADM

## 2021-07-03 RX ORDER — MIRTAZAPINE 15 MG/1
15 TABLET, FILM COATED ORAL NIGHTLY
Status: DISCONTINUED | OUTPATIENT
Start: 2021-07-03 | End: 2021-07-08 | Stop reason: HOSPADM

## 2021-07-03 RX ORDER — PANTOPRAZOLE SODIUM 40 MG/1
40 TABLET, DELAYED RELEASE ORAL
Status: DISCONTINUED | OUTPATIENT
Start: 2021-07-03 | End: 2021-07-08 | Stop reason: HOSPADM

## 2021-07-03 RX ORDER — CHLORTHALIDONE 25 MG/1
25 TABLET ORAL DAILY
Status: DISCONTINUED | OUTPATIENT
Start: 2021-07-03 | End: 2021-07-08 | Stop reason: HOSPADM

## 2021-07-03 RX ORDER — GUAIFENESIN 600 MG/1
1200 TABLET, EXTENDED RELEASE ORAL EVERY 12 HOURS SCHEDULED
Status: DISCONTINUED | OUTPATIENT
Start: 2021-07-03 | End: 2021-07-08 | Stop reason: HOSPADM

## 2021-07-03 RX ORDER — ROPINIROLE 0.25 MG/1
0.25 TABLET, FILM COATED ORAL NIGHTLY
Status: DISCONTINUED | OUTPATIENT
Start: 2021-07-03 | End: 2021-07-08 | Stop reason: HOSPADM

## 2021-07-03 RX ORDER — IPRATROPIUM BROMIDE AND ALBUTEROL SULFATE 2.5; .5 MG/3ML; MG/3ML
3 SOLUTION RESPIRATORY (INHALATION)
Status: DISCONTINUED | OUTPATIENT
Start: 2021-07-03 | End: 2021-07-06

## 2021-07-03 RX ORDER — LEVOTHYROXINE SODIUM 0.1 MG/1
100 TABLET ORAL
Status: DISCONTINUED | OUTPATIENT
Start: 2021-07-03 | End: 2021-07-08 | Stop reason: HOSPADM

## 2021-07-03 RX ORDER — AMLODIPINE BESYLATE 5 MG/1
10 TABLET ORAL DAILY
Status: DISCONTINUED | OUTPATIENT
Start: 2021-07-03 | End: 2021-07-08 | Stop reason: HOSPADM

## 2021-07-03 RX ORDER — BUDESONIDE AND FORMOTEROL FUMARATE DIHYDRATE 160; 4.5 UG/1; UG/1
2 AEROSOL RESPIRATORY (INHALATION)
Status: DISCONTINUED | OUTPATIENT
Start: 2021-07-03 | End: 2021-07-06

## 2021-07-03 RX ORDER — ROSUVASTATIN CALCIUM 10 MG/1
10 TABLET, COATED ORAL NIGHTLY
Status: DISCONTINUED | OUTPATIENT
Start: 2021-07-03 | End: 2021-07-08 | Stop reason: HOSPADM

## 2021-07-03 RX ORDER — LOSARTAN POTASSIUM 50 MG/1
100 TABLET ORAL DAILY
Status: DISCONTINUED | OUTPATIENT
Start: 2021-07-03 | End: 2021-07-08 | Stop reason: HOSPADM

## 2021-07-03 RX ORDER — FERROUS SULFATE TAB EC 324 MG (65 MG FE EQUIVALENT) 324 (65 FE) MG
324 TABLET DELAYED RESPONSE ORAL 3 TIMES WEEKLY
Status: DISCONTINUED | OUTPATIENT
Start: 2021-07-05 | End: 2021-07-08 | Stop reason: HOSPADM

## 2021-07-03 RX ORDER — POTASSIUM CHLORIDE 20 MEQ/1
40 TABLET, EXTENDED RELEASE ORAL ONCE
Status: COMPLETED | OUTPATIENT
Start: 2021-07-03 | End: 2021-07-03

## 2021-07-03 RX ORDER — DOCUSATE SODIUM 100 MG/1
100 CAPSULE, LIQUID FILLED ORAL 2 TIMES DAILY
Status: DISCONTINUED | OUTPATIENT
Start: 2021-07-03 | End: 2021-07-08 | Stop reason: HOSPADM

## 2021-07-03 RX ORDER — CALCIUM GLUCONATE 20 MG/ML
2 INJECTION, SOLUTION INTRAVENOUS ONCE
Status: COMPLETED | OUTPATIENT
Start: 2021-07-03 | End: 2021-07-03

## 2021-07-03 RX ORDER — METOCLOPRAMIDE 5 MG/1
5 TABLET ORAL 3 TIMES DAILY PRN
Status: DISCONTINUED | OUTPATIENT
Start: 2021-07-03 | End: 2021-07-08 | Stop reason: HOSPADM

## 2021-07-03 RX ORDER — IPRATROPIUM BROMIDE AND ALBUTEROL SULFATE 2.5; .5 MG/3ML; MG/3ML
3 SOLUTION RESPIRATORY (INHALATION) EVERY 4 HOURS PRN
Status: DISCONTINUED | OUTPATIENT
Start: 2021-07-03 | End: 2021-07-03 | Stop reason: SDUPTHER

## 2021-07-03 RX ORDER — CETIRIZINE HYDROCHLORIDE 10 MG/1
10 TABLET ORAL NIGHTLY
Status: DISCONTINUED | OUTPATIENT
Start: 2021-07-03 | End: 2021-07-08 | Stop reason: HOSPADM

## 2021-07-03 RX ADMIN — AMLODIPINE BESYLATE 10 MG: 5 TABLET ORAL at 08:51

## 2021-07-03 RX ADMIN — ENOXAPARIN SODIUM 40 MG: 40 INJECTION SUBCUTANEOUS at 15:23

## 2021-07-03 RX ADMIN — Medication 3 ML: at 21:18

## 2021-07-03 RX ADMIN — ROSUVASTATIN CALCIUM 10 MG: 10 TABLET, FILM COATED ORAL at 03:08

## 2021-07-03 RX ADMIN — BUDESONIDE AND FORMOTEROL FUMARATE DIHYDRATE 2 PUFF: 160; 4.5 AEROSOL RESPIRATORY (INHALATION) at 20:22

## 2021-07-03 RX ADMIN — POTASSIUM CHLORIDE 40 MEQ: 1500 TABLET, EXTENDED RELEASE ORAL at 08:50

## 2021-07-03 RX ADMIN — CALCIUM GLUCONATE 2 G: 20 INJECTION, SOLUTION INTRAVENOUS at 12:30

## 2021-07-03 RX ADMIN — MIRTAZAPINE 15 MG: 15 TABLET, FILM COATED ORAL at 21:21

## 2021-07-03 RX ADMIN — Medication 3 ML: at 08:50

## 2021-07-03 RX ADMIN — LEVOTHYROXINE SODIUM 100 MCG: 0.1 TABLET ORAL at 05:32

## 2021-07-03 RX ADMIN — ACETAMINOPHEN ORAL SOLUTION 650 MG: 650 SOLUTION ORAL at 21:35

## 2021-07-03 RX ADMIN — SERTRALINE HYDROCHLORIDE 150 MG: 100 TABLET ORAL at 21:18

## 2021-07-03 RX ADMIN — ACETAMINOPHEN 650 MG: 325 TABLET ORAL at 08:57

## 2021-07-03 RX ADMIN — ANASTROZOLE 1 MG: 1 TABLET, COATED ORAL at 08:50

## 2021-07-03 RX ADMIN — LOSARTAN POTASSIUM 100 MG: 50 TABLET, FILM COATED ORAL at 08:50

## 2021-07-03 RX ADMIN — CHLORTHALIDONE 25 MG: 25 TABLET ORAL at 08:50

## 2021-07-03 RX ADMIN — DOCUSATE SODIUM 100 MG: 100 CAPSULE, LIQUID FILLED ORAL at 21:18

## 2021-07-03 RX ADMIN — BUDESONIDE AND FORMOTEROL FUMARATE DIHYDRATE 2 PUFF: 160; 4.5 AEROSOL RESPIRATORY (INHALATION) at 08:37

## 2021-07-03 RX ADMIN — PANTOPRAZOLE SODIUM 40 MG: 40 TABLET, DELAYED RELEASE ORAL at 08:51

## 2021-07-03 RX ADMIN — GUAIFENESIN 1200 MG: 600 TABLET, EXTENDED RELEASE ORAL at 21:18

## 2021-07-03 RX ADMIN — IPRATROPIUM BROMIDE AND ALBUTEROL SULFATE 3 ML: 2.5; .5 SOLUTION RESPIRATORY (INHALATION) at 11:55

## 2021-07-03 RX ADMIN — ONDANSETRON 4 MG: 2 INJECTION INTRAMUSCULAR; INTRAVENOUS at 19:30

## 2021-07-03 RX ADMIN — MIRTAZAPINE 15 MG: 15 TABLET, FILM COATED ORAL at 03:08

## 2021-07-03 RX ADMIN — IPRATROPIUM BROMIDE AND ALBUTEROL SULFATE 3 ML: 2.5; .5 SOLUTION RESPIRATORY (INHALATION) at 15:41

## 2021-07-03 RX ADMIN — DOXYCYCLINE 100 MG: 100 INJECTION, POWDER, LYOPHILIZED, FOR SOLUTION INTRAVENOUS at 08:50

## 2021-07-03 RX ADMIN — METOCLOPRAMIDE 5 MG: 5 TABLET ORAL at 21:35

## 2021-07-03 RX ADMIN — GUAIFENESIN 600 MG: 600 TABLET, EXTENDED RELEASE ORAL at 08:51

## 2021-07-03 RX ADMIN — ACETAMINOPHEN 650 MG: 325 TABLET ORAL at 15:31

## 2021-07-03 RX ADMIN — DOCUSATE SODIUM 100 MG: 100 CAPSULE, LIQUID FILLED ORAL at 08:51

## 2021-07-03 RX ADMIN — PIPERACILLIN AND TAZOBACTAM 3.38 G: 3; .375 INJECTION, POWDER, LYOPHILIZED, FOR SOLUTION INTRAVENOUS at 15:30

## 2021-07-03 RX ADMIN — ROPINIROLE HYDROCHLORIDE 0.25 MG: 0.25 TABLET, FILM COATED ORAL at 03:08

## 2021-07-03 RX ADMIN — IPRATROPIUM BROMIDE AND ALBUTEROL SULFATE 3 ML: 2.5; .5 SOLUTION RESPIRATORY (INHALATION) at 20:22

## 2021-07-03 RX ADMIN — PIPERACILLIN AND TAZOBACTAM 3.38 G: 3; .375 INJECTION, POWDER, LYOPHILIZED, FOR SOLUTION INTRAVENOUS at 11:15

## 2021-07-03 RX ADMIN — Medication 5000 UNITS: at 08:50

## 2021-07-03 RX ADMIN — CETIRIZINE HYDROCHLORIDE 10 MG: 10 TABLET, FILM COATED ORAL at 21:18

## 2021-07-03 RX ADMIN — ROPINIROLE HYDROCHLORIDE 0.25 MG: 0.25 TABLET, FILM COATED ORAL at 21:21

## 2021-07-03 RX ADMIN — ROSUVASTATIN CALCIUM 10 MG: 10 TABLET, FILM COATED ORAL at 21:21

## 2021-07-03 NOTE — H&P
"      [facility] Medicine Services      Patient Name: Diane Guan  : 1940  MRN: [id number]  Primary Care Physician: Jemima Fontaine MD  Date of admission: 2021    Patient Care Team:  Jemima Fontaine MD as PCP - General (Family Medicine)          Subjective   History Present Illness     Chief Complaint:   Chief Complaint   Patient presents with   • Fall     pt fell at home, c/o generalized pain, c/o increased SOA as well                  Very pleasant 81-year-old female lives at home with her  presents with complaints of generalized weakness and falls.  Reports she fell last night and could not get up and laid on the floor until her  got home.  She reports she fell again today and was too weak to get up.  She denies hitting her head or loss of consciousness.  She reports she cannot even crawl to get to the phones.  She reports home oxygen use 2 L at night.  She denies any increased shortness of air cough or fever.  She does report nasal congestion.  She has a past medical history of MAC infection.  She denies any chest pain, dizziness, nausea vomiting or diarrhea.  Chest x-ray today per radiology:    \"Increased left lower lung airspace opacities, which may be due to  atelectasis and/or pneumonia.  \"    Her temperature was 100.9 Fahrenheit, she is not tachycardic or hypotensive.  She is currently 93% on 2 L nasal cannula.  WBC is elevated at 28.6 lactic acid is 0.6, hemoglobin 10.5 and stable.  Troponin less than 0.010 and proBNP 1231.  CK is 241 creatinine is 1.36 (last creatinine between 1.06 and 1.6 per labs) review of records shows she had a 2D echo 2021 which showed an estimated ejection fraction of 65% with mild right ventricular cavity dilation.  She has a past medical history of breast cancer, hyperlipidemia, hypertension, chronic kidney disease stage III, anxiety depression.  Urinalysis today shows 1+ bacteria 6-12 WBCs and small " leukocytes.  She was started on 1 g IV ceftriaxone and 100 mg IV doxycycline twice daily and urine and blood cultures pending.  She was given an albuterol treatment in ED, and one-time dose of Lasix.  She will be admitted for further evaluation and treatment.  Respiratory panel including COVID-19 was negative today.      Review of Systems   Constitutional: Positive for chills and malaise/fatigue.   HENT: Positive for congestion.    Eyes: Negative.    Cardiovascular: Negative.    Respiratory: Negative.    Endocrine: Negative.    Hematologic/Lymphatic: Negative.    Skin: Negative.    Musculoskeletal: Positive for muscle weakness.   Gastrointestinal: Negative.    Genitourinary: Negative.    Neurological: Positive for weakness.   Psychiatric/Behavioral: Negative.    Allergic/Immunologic: Negative.            Personal History     Past Medical History:   Past Medical History:   Diagnosis Date   • Acute bronchitis due to human metapneumovirus 2/8/2020   • Anxiety disorder    • Arthritis    • Breast cancer (CMS/[facility]) 02/2019    Invasive ductal carcinoma   • Chronic lymphocytic leukemia (CLL), B-cell (CMS/[facility]) 01/2019   • Depression    • Disease of thyroid gland    • Diverticulosis of colon 2018    Identified on colonoscopy   • Dysphagia 12/2020   • Dyspnea on exertion    • Hemorrhoid    • Hiatal hernia 12/2020   • Hiatal hernia with GERD     large hiatal hernia with high-grade reflux on barium swallow; s/p laparoscopic fundoplication with gastropexy   • History of diabetes mellitus     no meds now   • Hyperlipidemia    • Hypertension    • Mycobacterium avium complex (CMS/[facility]) 02/2019    aspiration pneumonia; completed abx therapy   • OAB (overactive bladder)    • Sleep apnea     daughter states pt does not have and doesn't have machine       Surgical History:      Past Surgical History:   Procedure Laterality Date   • BLADDER SURGERY     • BRONCHOSCOPY N/A 9/13/2019    Procedure: BRONCHOSCOPY with bronchial washing;   Surgeon: Marnie Frankel MD;  Location: [facility] ENDOSCOPY;  Service: Pulmonary   • COLONOSCOPY  07/19/2018    severe diverticulosis   • CYST REMOVAL      Removed a fatty cyst off of her back   • ENDOSCOPY N/A 11/23/2020    Procedure: ESOPHAGOGASTRODUODENOSCOPY with dilatation (Bougie # 48, 50, 52, 54, 56, 58);  Surgeon: Den Plummer DO;  Location: [facility] ENDOSCOPY;  Service: General;  Laterality: N/A;  Post: large hiatal hernia, joshua's ulcers   • HIATAL HERNIA REPAIR N/A 12/30/2020    Procedure: Laparoscopic hiatal hernia repair with gastropexy;  Surgeon: Den Plummer DO;  Location: [facility] MAIN OR;  Service: General;  Laterality: N/A;   • MASTECTOMY Right 02/04/2019    Invasive ductal carcinoma   • TUBAL ABDOMINAL LIGATION             Family History: family history includes Arthritis in an other family member; Diabetes in her mother; Heart disease in an other family member. Family History was reviewed.     Social History:  reports that she has quit smoking. She has never used smokeless tobacco. She reports that she does not drink alcohol and does not use drugs.      Medications:  Prior to Admission medications    Medication Sig Start Date End Date Taking? Authorizing Provider   amLODIPine (NORVASC) 10 MG tablet TAKE 1 TABLET BY MOUTH DAILY WITH LUNCH. 5/7/21   Jemima Fontaine MD   anastrozole (ARIMIDEX) 1 MG tablet TAKE 1 TABLET BY MOUTH EVERY DAY 5/10/21   Asia Queen APRN   benzonatate (TESSALON) 200 MG capsule Take 1 capsule by mouth 3 (Three) Times a Day As Needed for Cough. 1/13/21   Violet Fletcher DO   budesonide-formoterol (SYMBICORT) 160-4.5 MCG/ACT inhaler Inhale 2 puffs 2 (Two) Times a Day. Rinse mouth after each use. 3/26/21   Jemima Fontaine MD   cetirizine (zyrTEC) 10 MG tablet Take 10 mg by mouth every night at bedtime.    Provider, MD Lala   chlorthalidone (HYGROTON) 25 MG tablet TAKE 1 TABLET BY MOUTH EVERY DAY 5/17/21   Jemima Fontaine MD   docusate sodium  (COLACE) 100 MG capsule Take 100 mg by mouth 2 (Two) Times a Day. 3/19/19   Lala Mcclure MD   ferrous sulfate 324 (65 Fe) MG tablet delayed-release EC tablet Take 324 mg by mouth 3 (Three) Times a Week.    Lala Mcclure MD   HYDROcodone-acetaminophen (Norco) 7.5-325 MG per tablet Take 1 tablet by mouth Every 6 (Six) Hours As Needed for Moderate Pain . 1/13/21   Violet Fletcher DO   ipratropium-albuterol (DUO-NEB) 0.5-2.5 mg/3 ml nebulizer Take 3 mL by nebulization Every 4 (Four) Hours. J 44.9  Patient taking differently: Take 3 mL by nebulization Every 4 (Four) Hours As Needed. J 44.9 2/12/20   Violet Fletcher DO   levothyroxine (SYNTHROID, LEVOTHROID) 100 MCG tablet Take 1 tablet by mouth Every Morning Before Breakfast. Take on empty stomach.  Patient taking differently: Take 100 mcg by mouth Every Morning Before Breakfast. Take on empty stomach.  Take dos 3/3/20   Jemima Fontaine MD   losartan (COZAAR) 100 MG tablet TAKE 1 TABLET BY MOUTH EVERY DAY 5/17/21   Jemima Fontaine MD   metoclopramide (REGLAN) 5 MG tablet TAKE 1 TABLET BY MOUTH 3 (THREE) TIMES A DAY AS NEEDED (FOR NAUSEA AND VOMITING). 6/24/21   Jemima Fontaine MD   mirtazapine (REMERON) 15 MG tablet Take 1 tablet by mouth Every Night. 3/26/21   Jemima Fontaine MD   pantoprazole (PROTONIX) 40 MG EC tablet TAKE 1 TABLET BY MOUTH EVERY MORNING BEFORE BREAKFAST. TAKE ON AN EMPTY STOMACH! 3/25/21   Jemima Fontaine MD   rOPINIRole (REQUIP) 0.25 MG tablet Take 1 tablet by mouth Every Night. Take 1 hour before bedtime. 4/5/21   Jemima Fontaine MD   rosuvastatin (CRESTOR) 10 MG tablet Take 1 tablet by mouth Every Night. 2/7/21   Jemima Fontaine MD   sertraline (ZOLOFT) 100 MG tablet Take 1.5 tablets by mouth every night at bedtime. 3/26/21   Jemima Fontaine MD   vitamin D (ERGOCALCIFEROL) 1.25 MG (70812 UT) capsule capsule Take 1 capsule by mouth Every 7 (Seven) Days. 5/3/21   Jemima Fontaine  MD Margaret       Allergies:  No Known Allergies    Objective   Objective     Vital Signs  Temp:  [97.7 °F (36.5 °C)-100.9 °F (38.3 °C)] 98.2 °F (36.8 °C)  Heart Rate:  [] 86  Resp:  [14-20] 14  BP: (115-149)/(48-74) 143/74  SpO2:  [87 %-93 %] 93 %  on  Flow (L/min):  [4] 4;   Device (Oxygen Therapy): nasal cannula  Body mass index is 32.92 kg/m².    Physical Exam  Vitals reviewed.   Constitutional:       Appearance: Normal appearance. She is obese.   HENT:      Head: Normocephalic and atraumatic.      Right Ear: External ear normal.      Left Ear: External ear normal.      Nose: Congestion present.      Mouth/Throat:      Mouth: Mucous membranes are moist.   Eyes:      Extraocular Movements: Extraocular movements intact.   Cardiovascular:      Rate and Rhythm: Normal rate and regular rhythm.      Pulses: Normal pulses.      Heart sounds: Normal heart sounds.   Pulmonary:      Effort: Pulmonary effort is normal.      Breath sounds: Normal breath sounds.   Abdominal:      Palpations: Abdomen is soft.   Genitourinary:     Comments: deferred  Musculoskeletal:         General: Normal range of motion.      Cervical back: Normal range of motion and neck supple.   Skin:     General: Skin is warm and dry.      Comments: Abrasion on bridge of nose patient reports from glasses   Neurological:      General: No focal deficit present.      Mental Status: She is alert and oriented to person, place, and time.   Psychiatric:         Mood and Affect: Mood normal.         Behavior: Behavior normal.         Thought Content: Thought content normal.         Judgment: Judgment normal.           Results Review:  I have personally reviewed most recent radiology images and interpretations and agree with findings, most notably: Chest x-ray, CBC and CMP urinalysis.    Results from last 7 days   Lab Units 07/02/21  1828   WBC 10*3/mm3 28.60*   HEMOGLOBIN g/dL 10.5*   HEMATOCRIT % 32.9*   PLATELETS 10*3/mm3 161     Results from last 7 days    Lab Units 07/02/21  1832 07/02/21  1828   SODIUM mmol/L  --  133*   POTASSIUM mmol/L  --  3.7   CHLORIDE mmol/L  --  100   CO2 mmol/L  --  19.0*   BUN mg/dL  --  30*   CREATININE mg/dL  --  1.36*   GLUCOSE mg/dL  --  162*   CALCIUM mg/dL  --  8.2*   ALT (SGPT) U/L  --  12   AST (SGOT) U/L  --  28   TROPONIN T ng/mL  --  <0.010   PROBNP pg/mL  --  1,231.0   LACTATE mmol/L 0.6  --      Estimated Creatinine Clearance: 32.7 mL/min (A) (by C-G formula based on SCr of 1.36 mg/dL (H)).  Brief Urine Lab Results  (Last result in the past 365 days)      Color   Clarity   Blood   Leuk Est   Nitrite   Protein   CREAT   Urine HCG        07/02/21 1921 Yellow Clear Small (1+) Small (1+) Negative 100 mg/dL (2+)               Microbiology Results (last 10 days)     Procedure Component Value - Date/Time    Respiratory Panel PCR w/COVID-19(SARS-CoV-2) LESLEE/VIVIANE/PAVAN/PAD/COR/MAD/JUAN DAVID In-House, NP Swab in UTM/VTM, 3-4 HR TAT - Swab, Nasopharynx [[id number]]  (Normal) Collected: 07/02/21 2216    Lab Status: Final result Specimen: Swab from Nasopharynx Updated: 07/02/21 2309     ADENOVIRUS, PCR Not Detected     Coronavirus 229E Not Detected     Coronavirus HKU1 Not Detected     Coronavirus NL63 Not Detected     Coronavirus OC43 Not Detected     COVID19 Not Detected     Human Metapneumovirus Not Detected     Human Rhinovirus/Enterovirus Not Detected     Influenza A PCR Not Detected     Influenza B PCR Not Detected     Parainfluenza Virus 1 Not Detected     Parainfluenza Virus 2 Not Detected     Parainfluenza Virus 3 Not Detected     Parainfluenza Virus 4 Not Detected     RSV, PCR Not Detected     Bordetella pertussis pcr Not Detected     Bordetella parapertussis PCR Not Detected     Chlamydophila pneumoniae PCR Not Detected     Mycoplasma pneumo by PCR Not Detected    Narrative:      In the setting of a positive respiratory panel with a viral infection PLUS a negative procalcitonin without other underlying concern for bacterial infection,  consider observing off antibiotics or discontinuation of antibiotics and continue supportive care. If the respiratory panel is positive for atypical bacterial infection (Bordetella pertussis, Chlamydophila pneumoniae, or Mycoplasma pneumoniae), consider antibiotic de-escalation to target atypical bacterial infection.          ECG/EMG Results (most recent)     Procedure Component Value Units Date/Time    ECG 12 Lead [[id number]] Collected: 07/02/21 1637     Updated: 07/02/21 1639     QT Interval 322 ms     Narrative:      HEART RATE= 104  bpm  RR Interval= 580  ms  IA Interval= 123  ms  P Horizontal Axis= -27  deg  P Front Axis= 17  deg  QRSD Interval= 89  ms  QT Interval= 322  ms  QRS Axis= 48  deg  T Wave Axis= 63  deg  - OTHERWISE NORMAL ECG -  Sinus tachycardia  Low voltage, precordial leads  When compared with ECG of 30-Dec-2020 13:14:52,  Significant change in rhythm  Electronically Signed By:   Date and Time of Study: 2021-07-02 16:37:22          Results for orders placed during the hospital encounter of 12/30/20    Duplex Venous Lower Extremity - Bilateral CAR    Interpretation Summary  · Normal bilateral lower extremity venous duplex scan.      Results for orders placed during the hospital encounter of 12/30/20    Adult Transthoracic Echo Complete W/ Cont if Necessary Per Protocol    Interpretation Summary  · Estimated left ventricular EF = 65% Estimated left ventricular EF was in agreement with the calculated left ventricular EF. Left ventricular systolic function is normal.  · There is mainly affecting the non-coronary and left coronary cusp(s).  · The right ventricular cavity is mildly dilated.  · Estimated right ventricular systolic pressure from tricuspid regurgitation is normal (<35 mmHg).      XR Chest 1 View    Result Date: 7/2/2021  Increased left lower lung airspace opacities, which may be due to atelectasis and/or pneumonia.  Electronically Signed By-Jacquie Arroyo MD On:7/2/2021 5:58 PM This  report was finalized on [id number] by  Jacquie Arroyo MD.        Estimated Creatinine Clearance: 32.7 mL/min (A) (by C-G formula based on SCr of 1.36 mg/dL (H)).    Assessment/Plan   Assessment/Plan       Active Hospital Problems    Diagnosis  POA   • Acute UTI (urinary tract infection) [N39.0]  Yes   • Leukocytosis [D72.829]  Yes   • CKD (chronic kidney disease) stage 3, GFR 30-59 ml/min (CMS/HCC) [N18.30]  Yes   • Pneumonia of left lung due to infectious organism [J18.9]  Yes      Resolved Hospital Problems   No resolved problems to display.     Generalized weakness likely multifactorial see plans below: PT eval and treat for frequent falls patient safety on discharge    Leukocytosis WBC 28.6 febrile 100.9 Fahrenheit, not hypotensive or tachycardic lactic acid 0.6 does not appear septic, likely multifactorial from UTI and left-sided pneumonia see plan below    Stable left lower lung pneumonia per chest x-ray, aspiratory panel negative, continue IV ceftriaxone and doxycycline with blood cultures pending, has medical history of MAC 2 L nasal cannula at night, stable, oxygen to maintain sats at 90 to 92%, on home Symbicort, duo nebs    Acute UTI 1+ bacteria 6-12 WBCs small leukocyte esterase, on IV ceftriaxone with urine culture pending denies dysuria    HFpEF?  EF 65% per last heart cath January 6, 2021, proBNP 1231, given one-time dose 40 mg Lasix in ED    Chronic kidney disease stage III creatinine today 1.36 likely at baseline per labs, saline at 50 cc/h, trend renal function    Mild nonfasting hyperlipidemia serum glucose 162, denies diabetes mellitus no home meds, low concentrated sweet diet repeat serum glucose in a.m.    Mild hypocalcemia 8.2 ionized calcium in a.m.    Normocytic anemia hemoglobin 10.5 stable from previous no signs of bleeding    Essential hypertension, controlled on home Norvasc, chlorthalidone, losartan, monitor BP    GERD on PPI and Reglan    Restless leg syndrome on ropinirole    Past  medical history of breast cancer on anastrozole    He is in a rhinitis on cetirizine    Dietary supplementation on vitamin D3, FE    Hypothyroidism on home levothyroxine    Anxiety depression, on home Remeron, Zoloft    Hyperlipidemia on statin    Stool softener on Colace            VTE Prophylaxis -   Mechanical Order History:     None      Pharmalogical Order History:      Ordered     Dose Route Frequency Stop    07/02/21 2010  enoxaparin (LOVENOX) syringe 40 mg      40 mg SC Every 24 Hours --                CODE STATUS:    Code Status and Medical Interventions:   Ordered at: 07/02/21 2010     Code Status:    CPR     Medical Interventions (Level of Support Prior to Arrest):    Full       This patient has been examined wearing appropriate Personal Protective Equipment     I discussed the patient's findings and my recommendations with patient and family member at bedside..      Signature:Electronically signed by SIOMARA Jolly, 07/03/21, 1:21 AM EDT.    [facility] Hospitalist Team

## 2021-07-03 NOTE — PLAN OF CARE
Goal Outcome Evaluation:      Patient came in yesterday and has left lower lung pneumonia. Patient is covid (-). Patient had urine, sputum, and MRSA screening done today. Pt has had a productive thick cough most of the day. Tylenol was given x 2 for fever. Last checked at 1600 was 99.6. IV abx going.

## 2021-07-03 NOTE — PROGRESS NOTES
"      [facility] Medicine Services Daily Progress Note      Hospitalist Team  LOS 0 days      Patient Care Team:  Jemima Fontaine MD as PCP - General (Family Medicine)    Patient Location: 242/1      Subjective   Subjective     Chief Complaint / Subjective  Chief Complaint   Patient presents with   • Fall     pt fell at home, c/o generalized pain, c/o increased SOA as well         Brief Synopsis of Hospital Course/HPI  Very pleasant 81-year-old female lives at home with her  presents with complaints of generalized weakness and falls.  Reports she fell last night and could not get up and laid on the floor until her  got home.  She reports she fell again today and was too weak to get up.  She denies hitting her head or loss of consciousness.  She reports she cannot even crawl to get to the phones.  She reports home oxygen use 2 L at night.  She denies any increased shortness of air cough or fever.  She does report nasal congestion.  She has a past medical history of MAC infection.  She denies any chest pain, dizziness, nausea vomiting or diarrhea.  Chest x-ray today per radiology:     \"Increased left lower lung airspace opacities, which may be due to  atelectasis and/or pneumonia.  \"     Her temperature was 100.9 Fahrenheit, she is not tachycardic or hypotensive.  She is currently 93% on 2 L nasal cannula.  WBC is elevated at 28.6 lactic acid is 0.6, hemoglobin 10.5 and stable.  Troponin less than 0.010 and proBNP 1231.  CK is 241 creatinine is 1.36 (last creatinine between 1.06 and 1.6 per labs) review of records shows she had a 2D echo January 6, 2021 which showed an estimated ejection fraction of 65% with mild right ventricular cavity dilation.  She has a past medical history of breast cancer, hyperlipidemia, hypertension, chronic kidney disease stage III, anxiety depression.  Urinalysis today shows 1+ bacteria 6-12 WBCs and small leukocytes.  She was started on 1 g IV " "ceftriaxone and 100 mg IV doxycycline twice daily and urine and blood cultures pending.  She was given an albuterol treatment in ED, and one-time dose of Lasix.  She will be admitted for further evaluation and treatment.  Respiratory panel including COVID-19 was negative today.        7/3- patient reports she still feel very weak. She is coughing up clear mucus.       Review of Systems   Constitutional: Positive for malaise/fatigue.   Cardiovascular: Negative for chest pain and palpitations.   Respiratory: Positive for cough, shortness of breath, sputum production and wheezing.    Musculoskeletal: Positive for falls.   Gastrointestinal: Negative for abdominal pain.   Neurological: Positive for weakness.         Objective   Objective      Vital Signs  Temp:  [97.7 °F (36.5 °C)-100.9 °F (38.3 °C)] 100.6 °F (38.1 °C)  Heart Rate:  [] 97  Resp:  [14-21] 20  BP: (115-149)/(48-79) 137/74  Oxygen Therapy  SpO2: 92 %  Pulse Oximetry Type: Continuous  Device (Oxygen Therapy): nasal cannula  Flow (L/min): 4  Flowsheet Rows      First Filed Value   Admission Height  157.5 cm (62\") Documented at 07/02/2021 1617   Admission Weight  81.6 kg (180 lb) Documented at 07/02/2021 1617        Intake & Output (last 3 days)       06/30 0701 - 07/01 0700 07/01 0701 - 07/02 0700 07/02 0701 - 07/03 0700 07/03 0701 - 07/04 0700    P.O.    360    IV Piggyback   1000     Total Intake(mL/kg)   1000 (12.3) 360 (4.4)    Urine (mL/kg/hr)    700 (2.5)    Total Output    700    Net   +1000 -340                Lines, Drains & Airways    Active LDAs     Name:   Placement date:   Placement time:   Site:   Days:    Peripheral IV 07/02/21 1825 Left;Posterior Hand   07/02/21 1825    Hand   less than 1    Peripheral IV 07/01/21 2200 Anterior;Left;Upper Arm   07/01/21 2200 placed in ER    Arm   1                  Physical Exam:    Physical Exam  Vitals reviewed.   Constitutional:       General: She is not in acute distress.     Appearance: She is " obese.   HENT:      Head: Normocephalic and atraumatic.      Mouth/Throat:      Mouth: Mucous membranes are moist.   Cardiovascular:      Rate and Rhythm: Normal rate and regular rhythm.      Heart sounds: No murmur heard.     Pulmonary:      Breath sounds: Wheezing and rhonchi present.   Abdominal:      General: Bowel sounds are normal. There is no distension.      Palpations: Abdomen is soft.      Tenderness: There is no abdominal tenderness.   Musculoskeletal:      Right lower leg: No edema.      Left lower leg: No edema.   Skin:     General: Skin is warm and dry.      Findings: No rash.   Neurological:      Mental Status: She is alert.   Psychiatric:         Mood and Affect: Mood normal.         Behavior: Behavior normal.               Procedures:              Results Review:     I reviewed the patient's new clinical results.      Lab Results (last 24 hours)     Procedure Component Value Units Date/Time    Manual Differential [[id number]]  (Abnormal) Collected: 07/03/21 0449    Specimen: Blood Updated: 07/03/21 0606     Neutrophil % 37.0 %      Lymphocyte % 56.0 %      Monocyte % 2.0 %      Bands %  5.0 %      Neutrophils Absolute 12.05 10*3/mm3      Lymphocytes Absolute 16.07 10*3/mm3      Monocytes Absolute 0.57 10*3/mm3      RBC Morphology Normal     WBC Morphology Normal     Platelet Estimate Adequate    Narrative:      Reviewed by Pathologist within the past 30 days on 7/6/2021 .      Scan Slide [[id number]] Collected: 07/03/21 0449    Specimen: Blood Updated: 07/03/21 0606     Scan Slide --     Comment: See Manual Differential Results       CBC Auto Differential [[id number]]  (Abnormal) Collected: 07/03/21 0449    Specimen: Blood Updated: 07/03/21 0606     WBC 28.70 10*3/mm3      RBC 3.63 10*6/mm3      Hemoglobin 9.9 g/dL      Hematocrit 31.3 %      MCV 86.1 fL      MCH 27.4 pg      MCHC 31.8 g/dL      RDW 14.8 %      RDW-SD 44.2 fl      MPV 9.7 fL      Platelets 168 10*3/mm3     Narrative:      The  previously reported component NRBC is no longer being reported. Previous result was 0.0 /100 WBC (Reference Range: 0.0-0.2 /100 WBC) on 7/3/2021 at 0524 EDT.    Basic Metabolic Panel [[id number]]  (Abnormal) Collected: 07/03/21 0449    Specimen: Blood Updated: 07/03/21 0538     Glucose 141 mg/dL      BUN 31 mg/dL      Creatinine 1.39 mg/dL      Sodium 135 mmol/L      Potassium 3.4 mmol/L      Chloride 100 mmol/L      CO2 20.0 mmol/L      Calcium 8.1 mg/dL      eGFR Non African Amer 36 mL/min/1.73      BUN/Creatinine Ratio 22.3     Anion Gap 15.0 mmol/L     Narrative:      GFR Normal >60  Chronic Kidney Disease <60  Kidney Failure <15      Calcium, Ionized [[id number]]  (Abnormal) Collected: 07/03/21 0449    Specimen: Blood Updated: 07/03/21 0538     Ionized Calcium 1.11 mmol/L     Lactic Acid, Plasma [[id number]]  (Normal) Collected: 07/03/21 0449    Specimen: Blood Updated: 07/03/21 0536     Lactate 1.2 mmol/L     Respiratory Panel PCR w/COVID-19(SARS-CoV-2) LESLEE/VIVIANE/PAVAN/PAD/COR/MAD/JUAN DAVID In-House, NP Swab in UTM/VTM, 3-4 HR TAT - Swab, Nasopharynx [[id number]]  (Normal) Collected: 07/02/21 2216    Specimen: Swab from Nasopharynx Updated: 07/02/21 2309     ADENOVIRUS, PCR Not Detected     Coronavirus 229E Not Detected     Coronavirus HKU1 Not Detected     Coronavirus NL63 Not Detected     Coronavirus OC43 Not Detected     COVID19 Not Detected     Human Metapneumovirus Not Detected     Human Rhinovirus/Enterovirus Not Detected     Influenza A PCR Not Detected     Influenza B PCR Not Detected     Parainfluenza Virus 1 Not Detected     Parainfluenza Virus 2 Not Detected     Parainfluenza Virus 3 Not Detected     Parainfluenza Virus 4 Not Detected     RSV, PCR Not Detected     Bordetella pertussis pcr Not Detected     Bordetella parapertussis PCR Not Detected     Chlamydophila pneumoniae PCR Not Detected     Mycoplasma pneumo by PCR Not Detected    Narrative:      In the setting of a positive respiratory panel with a viral  infection PLUS a negative procalcitonin without other underlying concern for bacterial infection, consider observing off antibiotics or discontinuation of antibiotics and continue supportive care. If the respiratory panel is positive for atypical bacterial infection (Bordetella pertussis, Chlamydophila pneumoniae, or Mycoplasma pneumoniae), consider antibiotic de-escalation to target atypical bacterial infection.    Urinalysis, Microscopic Only - Urine, Catheter [[id number]]  (Abnormal) Collected: 07/02/21 1921    Specimen: Urine, Catheter Updated: 07/02/21 2013     RBC, UA None Seen /HPF      WBC, UA 6-12 /HPF      Bacteria, UA 1+ /HPF      Squamous Epithelial Cells, UA 0-2 /HPF      Hyaline Casts, UA 0-2 /LPF      Methodology Manual Light Microscopy    Urine Culture - Urine, Urine, Catheter [[id number]] Collected: 07/02/21 1921    Specimen: Urine, Catheter Updated: 07/02/21 2013    Urinalysis With Culture If Indicated - Urine, Catheter [[id number]]  (Abnormal) Collected: 07/02/21 1921    Specimen: Urine, Catheter Updated: 07/02/21 2002     Color, UA Yellow     Appearance, UA Clear     pH, UA 5.5     Specific Gravity, UA 1.020     Glucose, UA Negative     Ketones, UA Trace     Bilirubin, UA Negative     Blood, UA Small (1+)     Protein,  mg/dL (2+)     Leuk Esterase, UA Small (1+)     Nitrite, UA Negative     Urobilinogen, UA 1.0 E.U./dL    Manual Differential [[id number]]  (Abnormal) Collected: 07/02/21 1828    Specimen: Blood Updated: 07/02/21 1944     Neutrophil % 42.0 %      Lymphocyte % 56.0 %      Monocyte % 1.0 %      Myelocyte % 1.0 %      Neutrophils Absolute 12.01 10*3/mm3      Lymphocytes Absolute 16.02 10*3/mm3      Monocytes Absolute 0.29 10*3/mm3      RBC Morphology Normal     WBC Morphology Normal     Platelet Morphology Normal    Path Consult Reflex [[id number]] Collected: 07/02/21 1828    Specimen: Blood Updated: 07/02/21 1944     Pathology Review Yes    CBC & Differential [[id number]]  (Abnormal)  Collected: 07/02/21 1828    Specimen: Blood Updated: 07/02/21 1944    Narrative:      The following orders were created for panel order CBC & Differential.  Procedure                               Abnormality         Status                     ---------                               -----------         ------                     Scan Slide[[id number]]                                       Final result               CBC Auto Differential[[id number]]        Abnormal            Final result                 Please view results for these tests on the individual orders.    Scan Slide [[id number]] Collected: 07/02/21 1828    Specimen: Blood Updated: 07/02/21 1944     Scan Slide --     Comment: See Manual Differential Results       CBC Auto Differential [[id number]]  (Abnormal) Collected: 07/02/21 1828    Specimen: Blood Updated: 07/02/21 1944     WBC 28.60 10*3/mm3      RBC 3.77 10*6/mm3      Hemoglobin 10.5 g/dL      Hematocrit 32.9 %      MCV 87.3 fL      MCH 27.9 pg      MCHC 32.0 g/dL      RDW 14.9 %      RDW-SD 44.6 fl      MPV 9.7 fL      Platelets 161 10*3/mm3     Narrative:      The previously reported component NRBC is no longer being reported. Previous result was 0.1 /100 WBC (Reference Range: 0.0-0.2 /100 WBC) on 7/2/2021 at 1848 EDT.    Blood Culture - Blood, Leg, Left [[id number]] Collected: 07/02/21 1938    Specimen: Blood from Leg, Left Updated: 07/02/21 1942    Extra Tubes [[id number]] Collected: 07/02/21 1828    Specimen: Blood, Venous Line Updated: 07/02/21 1930    Narrative:      The following orders were created for panel order Extra Tubes.  Procedure                               Abnormality         Status                     ---------                               -----------         ------                     Green Top (Gel)[[id number]]                                  Final result                 Please view results for these tests on the individual orders.    Green Top (Gel) [[id number]] Collected:  07/02/21 1828    Specimen: Blood Updated: 07/02/21 1930     Extra Tube Hold for add-ons.     Comment: Auto resulted.       Comprehensive Metabolic Panel [[id number]]  (Abnormal) Collected: 07/02/21 1828    Specimen: Blood Updated: 07/02/21 1911     Glucose 162 mg/dL      BUN 30 mg/dL      Creatinine 1.36 mg/dL      Sodium 133 mmol/L      Potassium 3.7 mmol/L      Chloride 100 mmol/L      CO2 19.0 mmol/L      Calcium 8.2 mg/dL      Total Protein 6.8 g/dL      Albumin 3.60 g/dL      ALT (SGPT) 12 U/L      AST (SGOT) 28 U/L      Alkaline Phosphatase 109 U/L      Total Bilirubin 0.5 mg/dL      eGFR Non African Amer 37 mL/min/1.73      Globulin 3.2 gm/dL      A/G Ratio 1.1 g/dL      BUN/Creatinine Ratio 22.1     Anion Gap 14.0 mmol/L     Narrative:      GFR Normal >60  Chronic Kidney Disease <60  Kidney Failure <15      Lipase [[id number]]  (Abnormal) Collected: 07/02/21 1828    Specimen: Blood Updated: 07/02/21 1911     Lipase 12 U/L     Troponin [[id number]]  (Normal) Collected: 07/02/21 1828    Specimen: Blood Updated: 07/02/21 1911     Troponin T <0.010 ng/mL     Narrative:      Troponin T Reference Range:  <= 0.03 ng/mL-   Negative for AMI  >0.03 ng/mL-     Abnormal for myocardial necrosis.  Clinicians would have to utilize clinical acumen, EKG, Troponin and serial changes to determine if it is an Acute Myocardial Infarction or myocardial injury due to an underlying chronic condition.       Results may be falsely decreased if patient taking Biotin.      CK [[id number]]  (Abnormal) Collected: 07/02/21 1828    Specimen: Blood Updated: 07/02/21 1911     Creatine Kinase 241 U/L     BNP [[id number]]  (Normal) Collected: 07/02/21 1828    Specimen: Blood Updated: 07/02/21 1908     proBNP 1,231.0 pg/mL     Narrative:      Among patients with dyspnea, NT-proBNP is highly sensitive for the detection of acute congestive heart failure. In addition NT-proBNP of <300 pg/ml effectively rules out acute congestive heart failure with 99%  negative predictive value.    Results may be falsely decreased if patient taking Biotin.      Blood Culture - Blood, Arm, Left [[id number]] Collected: 07/02/21 1828    Specimen: Blood from Arm, Left Updated: 07/02/21 1836    POC Lactate [[id number]]  (Normal) Collected: 07/02/21 1832    Specimen: Blood Updated: 07/02/21 1833     Lactate 0.6 mmol/L      Comment: Serial Number: [id number]:  [id number]           No results found for: HGBA1C            Lab Results   Component Value Date    LIPASE 12 (L) 07/02/2021     Lab Results   Component Value Date    CHOL 374 (H) 01/03/2020    TRIG 311 (H) 01/03/2020    HDL 50 01/03/2020     (H) 01/03/2020       Lab Results   Lab Value Date/Time    FINALDX  12/31/2020 0205     Leukocytosis with absolute atypical lymphocytosis  Anemia  No blasts identified  If CBC indices persist/worsen, consider flow cytometry or other studies as clinically indicated to exclude a lymphoproliferative disorder      FINALDX  02/06/2020 1405     Absolute atypical lymphocytosis.  Anemia.  No blasts identified.  Recommend clinical follow-up and additional studies to include flow cytometry as clinically indicated to exclude CLL/SLL or other lymphoproliferative disorder.         Microbiology Results (last 10 days)     Procedure Component Value - Date/Time    Respiratory Panel PCR w/COVID-19(SARS-CoV-2) LESLEE/VIVIANE/PAVAN/PAD/COR/MAD/JUAN DAVID In-House, NP Swab in UTM/VTM, 3-4 HR TAT - Swab, Nasopharynx [[id number]]  (Normal) Collected: 07/02/21 2216    Lab Status: Final result Specimen: Swab from Nasopharynx Updated: 07/02/21 2309     ADENOVIRUS, PCR Not Detected     Coronavirus 229E Not Detected     Coronavirus HKU1 Not Detected     Coronavirus NL63 Not Detected     Coronavirus OC43 Not Detected     COVID19 Not Detected     Human Metapneumovirus Not Detected     Human Rhinovirus/Enterovirus Not Detected     Influenza A PCR Not Detected     Influenza B PCR Not Detected     Parainfluenza Virus 1 Not Detected      Parainfluenza Virus 2 Not Detected     Parainfluenza Virus 3 Not Detected     Parainfluenza Virus 4 Not Detected     RSV, PCR Not Detected     Bordetella pertussis pcr Not Detected     Bordetella parapertussis PCR Not Detected     Chlamydophila pneumoniae PCR Not Detected     Mycoplasma pneumo by PCR Not Detected    Narrative:      In the setting of a positive respiratory panel with a viral infection PLUS a negative procalcitonin without other underlying concern for bacterial infection, consider observing off antibiotics or discontinuation of antibiotics and continue supportive care. If the respiratory panel is positive for atypical bacterial infection (Bordetella pertussis, Chlamydophila pneumoniae, or Mycoplasma pneumoniae), consider antibiotic de-escalation to target atypical bacterial infection.          ECG/EMG Results (most recent)     Procedure Component Value Units Date/Time    ECG 12 Lead [[id number]] Collected: 07/02/21 1637     Updated: 07/02/21 1639     QT Interval 322 ms     Narrative:      HEART RATE= 104  bpm  RR Interval= 580  ms  IL Interval= 123  ms  P Horizontal Axis= -27  deg  P Front Axis= 17  deg  QRSD Interval= 89  ms  QT Interval= 322  ms  QRS Axis= 48  deg  T Wave Axis= 63  deg  - OTHERWISE NORMAL ECG -  Sinus tachycardia  Low voltage, precordial leads  When compared with ECG of 30-Dec-2020 13:14:52,  Significant change in rhythm  Electronically Signed By:   Date and Time of Study: 2021-07-02 16:37:22          Results for orders placed during the hospital encounter of 12/30/20    Duplex Venous Lower Extremity - Bilateral CAR    Interpretation Summary  · Normal bilateral lower extremity venous duplex scan.      Results for orders placed during the hospital encounter of 12/30/20    Adult Transthoracic Echo Complete W/ Cont if Necessary Per Protocol    Interpretation Summary  · Estimated left ventricular EF = 65% Estimated left ventricular EF was in agreement with the calculated left  ventricular EF. Left ventricular systolic function is normal.  · There is mainly affecting the non-coronary and left coronary cusp(s).  · The right ventricular cavity is mildly dilated.  · Estimated right ventricular systolic pressure from tricuspid regurgitation is normal (<35 mmHg).      XR Chest 1 View    Result Date: 7/2/2021  Increased left lower lung airspace opacities, which may be due to atelectasis and/or pneumonia.  Electronically Signed By-Jacquie Arroyo MD On:7/2/2021 5:58 PM This report was finalized on [id number] by  Jacquie Arroyo MD.          Xrays, labs reviewed personally by physician.    Medication Review:   I have reviewed the patient's current medication list      Scheduled Meds  amLODIPine, 10 mg, Oral, Daily  anastrozole, 1 mg, Oral, Daily  budesonide-formoterol, 2 puff, Inhalation, BID - RT  cetirizine, 10 mg, Oral, Nightly  chlorthalidone, 25 mg, Oral, Daily  docusate sodium, 100 mg, Oral, BID  enoxaparin, 40 mg, Subcutaneous, Daily  [START ON 7/5/2021] ferrous sulfate, 324 mg, Oral, Once per day on Mon Wed Fri  guaiFENesin, 600 mg, Oral, Q12H  levothyroxine, 100 mcg, Oral, Q AM  losartan, 100 mg, Oral, Daily  mirtazapine, 15 mg, Oral, Nightly  pantoprazole, 40 mg, Oral, QAM AC  piperacillin-tazobactam, 3.375 g, Intravenous, Once  piperacillin-tazobactam, 3.375 g, Intravenous, Q8H  rOPINIRole, 0.25 mg, Oral, Nightly  rosuvastatin, 10 mg, Oral, Nightly  sertraline, 150 mg, Oral, Nightly  sodium chloride, 3 mL, Intravenous, Q12H  vitamin D3, 5,000 Units, Oral, Daily        Meds Infusions  sodium chloride, 50 mL/hr, Last Rate: 50 mL/hr (07/02/21 2251)        Meds PRN  •  acetaminophen **OR** acetaminophen **OR** acetaminophen  •  albuterol sulfate HFA  •  benzonatate  •  ipratropium-albuterol  •  melatonin  •  metoclopramide  •  nitroglycerin  •  ondansetron **OR** ondansetron  •  [COMPLETED] Insert peripheral IV **AND** sodium chloride  •  sodium chloride        Assessment/Plan    Assessment/Plan     Active Hospital Problems    Diagnosis  POA   • Acute UTI (urinary tract infection) [N39.0]  Yes   • Leukocytosis [D72.829]  Yes   • CKD (chronic kidney disease) stage 3, GFR 30-59 ml/min (CMS/HCC) [N18.30]  Yes   • Pneumonia of left lung due to infectious organism [J18.9]  Yes      Resolved Hospital Problems   No resolved problems to display.       MEDICAL DECISION MAKING COMPLEXITY BY PROBLEM:     Sepsis d/t LLL pneumonia complicated by acute hypoxic respiratory failure   - dc Rocephin and doxycycline  - blood cx- pending  - viral panel negative  - start IV Zosyn  - check strep and legionella antigens  - sputum cx- pending  - add duonebs scheduled and prn  - Symbicort  - mucinex  - if no improvement consider CT chest and possible pulmonology consult given history of MAC    Abnormal UA  - urine cx- pending  - on antibiotics as above     Generalized weakness with falls  - likely d/t above   - consult PT/OT    Hypocalcemia  - replace IV 7/3/21  - repeat in am    Obesity  -  on lifestyle changes as appropriate      Chronic HFpEF  - stable, not in exacerbation  -   EF 65% per last heart cath January 6, 2021, proBNP 1231    Hypothyroidism  - on home levothyroxine     Anxiety depression  - on home Remeron, Zoloft     Hyperlipidemia   - on statin     Chronic kidney disease stage III creatinine today 1.36 likely at baseline per labs, saline at 50 cc/h, trend renal function    Hyperglycemia  - check Hgb A1c    Normocytic anemia   - hemoglobin 10.5 stable      Essential hypertension  - controlled on home Norvasc, chlorthalidone, losartan  - monitor bp with routine vs and make adjustments as needed     GERD  - on PPI and Reglan     Restless leg syndrome  - on ropinirole     History breast cancer  - on anastrozole     allergic rhinitis  - on cetirizine     Dietary supplementation  - on vitamin D3, FE     Stool softener on Colace      DVT Prophylaxis  - enoxaparin             VTE Prophylaxis -    Mechanical Order History:     None      Pharmalogical Order History:      Ordered     Dose Route Frequency Stop    07/02/21 2010  enoxaparin (LOVENOX) syringe 40 mg      40 mg SC Daily --                  Code Status -   Code Status and Medical Interventions:   Ordered at: 07/02/21 2010     Code Status:    CPR     Medical Interventions (Level of Support Prior to Arrest):    Full           Discharge Planning  Defer         Electronically signed by Violet Fletcher DO, 07/03/21, 10:29 EDT.  [facility] Hospitalist Team

## 2021-07-03 NOTE — PLAN OF CARE
Goal Outcome Evaluation:  Plan of Care Reviewed With: patient        Progress: no change  Outcome Summary: Pt came from the ER with complaints of weakness, she has penuomnia. She has had several falls recently. She has a productive cough. She has turned on her own and is using and external catheter.

## 2021-07-03 NOTE — NURSING NOTE
Spoke with April regarding pt K level of 3.4. She is going to look at patients chart and put in an order.

## 2021-07-04 LAB
ANION GAP SERPL CALCULATED.3IONS-SCNC: 13 MMOL/L (ref 5–15)
BACTERIA SPEC AEROBE CULT: ABNORMAL
BUN SERPL-MCNC: 36 MG/DL (ref 8–23)
BUN/CREAT SERPL: 20.6 (ref 7–25)
CA-I SERPL ISE-MCNC: 1.19 MMOL/L (ref 1.2–1.3)
CALCIUM SPEC-SCNC: 8.3 MG/DL (ref 8.6–10.5)
CHLORIDE SERPL-SCNC: 103 MMOL/L (ref 98–107)
CO2 SERPL-SCNC: 20 MMOL/L (ref 22–29)
CREAT SERPL-MCNC: 1.75 MG/DL (ref 0.57–1)
DEPRECATED RDW RBC AUTO: 45.5 FL (ref 37–54)
ERYTHROCYTE [DISTWIDTH] IN BLOOD BY AUTOMATED COUNT: 15.2 % (ref 12.3–15.4)
GFR SERPL CREATININE-BSD FRML MDRD: 28 ML/MIN/1.73
GLUCOSE SERPL-MCNC: 140 MG/DL (ref 65–99)
HBA1C MFR BLD: 6.6 % (ref 3.5–5.6)
HCT VFR BLD AUTO: 27.5 % (ref 34–46.6)
HGB BLD-MCNC: 8.8 G/DL (ref 12–15.9)
LYMPHOCYTES # BLD MANUAL: 10.03 10*3/MM3 (ref 0.7–3.1)
LYMPHOCYTES NFR BLD MANUAL: 3 % (ref 5–12)
LYMPHOCYTES NFR BLD MANUAL: 48 % (ref 19.6–45.3)
MAGNESIUM SERPL-MCNC: 1.5 MG/DL (ref 1.6–2.4)
MCH RBC QN AUTO: 27.3 PG (ref 26.6–33)
MCHC RBC AUTO-ENTMCNC: 31.9 G/DL (ref 31.5–35.7)
MCV RBC AUTO: 85.6 FL (ref 79–97)
MONOCYTES # BLD AUTO: 0.63 10*3/MM3 (ref 0.1–0.9)
NEUTROPHILS # BLD AUTO: 10.24 10*3/MM3 (ref 1.7–7)
NEUTROPHILS NFR BLD MANUAL: 49 % (ref 42.7–76)
PLAT MORPH BLD: NORMAL
PLATELET # BLD AUTO: 163 10*3/MM3 (ref 140–450)
PMV BLD AUTO: 9.3 FL (ref 6–12)
POTASSIUM SERPL-SCNC: 3.5 MMOL/L (ref 3.5–5.2)
QT INTERVAL: 322 MS
RBC # BLD AUTO: 3.22 10*6/MM3 (ref 3.77–5.28)
RBC MORPH BLD: NORMAL
SCAN SLIDE: NORMAL
SODIUM SERPL-SCNC: 136 MMOL/L (ref 136–145)
WBC # BLD AUTO: 20.9 10*3/MM3 (ref 3.4–10.8)
WBC MORPH BLD: NORMAL

## 2021-07-04 PROCEDURE — 25010000002 MAGNESIUM SULFATE 2 GM/50ML SOLUTION: Performed by: INTERNAL MEDICINE

## 2021-07-04 PROCEDURE — 83036 HEMOGLOBIN GLYCOSYLATED A1C: CPT | Performed by: INTERNAL MEDICINE

## 2021-07-04 PROCEDURE — 83735 ASSAY OF MAGNESIUM: CPT | Performed by: INTERNAL MEDICINE

## 2021-07-04 PROCEDURE — 94799 UNLISTED PULMONARY SVC/PX: CPT

## 2021-07-04 PROCEDURE — 25010000002 PIPERACILLIN SOD-TAZOBACTAM PER 1 G: Performed by: INTERNAL MEDICINE

## 2021-07-04 PROCEDURE — 99232 SBSQ HOSP IP/OBS MODERATE 35: CPT | Performed by: INTERNAL MEDICINE

## 2021-07-04 PROCEDURE — 85025 COMPLETE CBC W/AUTO DIFF WBC: CPT | Performed by: INTERNAL MEDICINE

## 2021-07-04 PROCEDURE — 82330 ASSAY OF CALCIUM: CPT | Performed by: INTERNAL MEDICINE

## 2021-07-04 PROCEDURE — 25010000002 ENOXAPARIN PER 10 MG: Performed by: NURSE PRACTITIONER

## 2021-07-04 PROCEDURE — 97162 PT EVAL MOD COMPLEX 30 MIN: CPT

## 2021-07-04 PROCEDURE — 80048 BASIC METABOLIC PNL TOTAL CA: CPT | Performed by: INTERNAL MEDICINE

## 2021-07-04 PROCEDURE — 25010000002 MAGNESIUM SULFATE IN D5W 1G/100ML (PREMIX) 1-5 GM/100ML-% SOLUTION: Performed by: INTERNAL MEDICINE

## 2021-07-04 PROCEDURE — 85007 BL SMEAR W/DIFF WBC COUNT: CPT | Performed by: INTERNAL MEDICINE

## 2021-07-04 PROCEDURE — 25010000002 ONDANSETRON PER 1 MG: Performed by: NURSE PRACTITIONER

## 2021-07-04 RX ORDER — MAGNESIUM SULFATE HEPTAHYDRATE 40 MG/ML
2 INJECTION, SOLUTION INTRAVENOUS ONCE
Status: COMPLETED | OUTPATIENT
Start: 2021-07-04 | End: 2021-07-04

## 2021-07-04 RX ORDER — MAGNESIUM SULFATE 1 G/100ML
1 INJECTION INTRAVENOUS ONCE
Status: COMPLETED | OUTPATIENT
Start: 2021-07-04 | End: 2021-07-04

## 2021-07-04 RX ADMIN — DOCUSATE SODIUM 100 MG: 100 CAPSULE, LIQUID FILLED ORAL at 20:01

## 2021-07-04 RX ADMIN — Medication 5000 UNITS: at 08:50

## 2021-07-04 RX ADMIN — GUAIFENESIN 1200 MG: 600 TABLET, EXTENDED RELEASE ORAL at 08:51

## 2021-07-04 RX ADMIN — CHLORTHALIDONE 25 MG: 25 TABLET ORAL at 08:50

## 2021-07-04 RX ADMIN — BENZONATATE 100 MG: 100 CAPSULE ORAL at 20:01

## 2021-07-04 RX ADMIN — IPRATROPIUM BROMIDE AND ALBUTEROL SULFATE 3 ML: 2.5; .5 SOLUTION RESPIRATORY (INHALATION) at 18:50

## 2021-07-04 RX ADMIN — CETIRIZINE HYDROCHLORIDE 10 MG: 10 TABLET, FILM COATED ORAL at 20:04

## 2021-07-04 RX ADMIN — SERTRALINE HYDROCHLORIDE 150 MG: 100 TABLET ORAL at 20:01

## 2021-07-04 RX ADMIN — LEVOTHYROXINE SODIUM 100 MCG: 0.1 TABLET ORAL at 06:05

## 2021-07-04 RX ADMIN — PIPERACILLIN AND TAZOBACTAM 3.38 G: 3; .375 INJECTION, POWDER, LYOPHILIZED, FOR SOLUTION INTRAVENOUS at 08:50

## 2021-07-04 RX ADMIN — MAGNESIUM SULFATE HEPTAHYDRATE 2 G: 2 INJECTION, SOLUTION INTRAVENOUS at 16:18

## 2021-07-04 RX ADMIN — MAGNESIUM SULFATE HEPTAHYDRATE 1 G: 1 INJECTION, SOLUTION INTRAVENOUS at 11:30

## 2021-07-04 RX ADMIN — GUAIFENESIN 1200 MG: 600 TABLET, EXTENDED RELEASE ORAL at 20:01

## 2021-07-04 RX ADMIN — SODIUM CHLORIDE 50 ML/HR: 9 INJECTION, SOLUTION INTRAVENOUS at 06:07

## 2021-07-04 RX ADMIN — ROPINIROLE HYDROCHLORIDE 0.25 MG: 0.25 TABLET, FILM COATED ORAL at 20:01

## 2021-07-04 RX ADMIN — ENOXAPARIN SODIUM 40 MG: 40 INJECTION SUBCUTANEOUS at 16:19

## 2021-07-04 RX ADMIN — PANTOPRAZOLE SODIUM 40 MG: 40 TABLET, DELAYED RELEASE ORAL at 08:50

## 2021-07-04 RX ADMIN — PIPERACILLIN AND TAZOBACTAM 3.38 G: 3; .375 INJECTION, POWDER, LYOPHILIZED, FOR SOLUTION INTRAVENOUS at 00:58

## 2021-07-04 RX ADMIN — BUDESONIDE AND FORMOTEROL FUMARATE DIHYDRATE 2 PUFF: 160; 4.5 AEROSOL RESPIRATORY (INHALATION) at 18:51

## 2021-07-04 RX ADMIN — MIRTAZAPINE 15 MG: 15 TABLET, FILM COATED ORAL at 20:01

## 2021-07-04 RX ADMIN — BENZONATATE 100 MG: 100 CAPSULE ORAL at 02:31

## 2021-07-04 RX ADMIN — ROSUVASTATIN CALCIUM 10 MG: 10 TABLET, FILM COATED ORAL at 20:01

## 2021-07-04 RX ADMIN — ONDANSETRON 4 MG: 2 INJECTION INTRAMUSCULAR; INTRAVENOUS at 11:34

## 2021-07-04 RX ADMIN — Medication 3 ML: at 20:03

## 2021-07-04 RX ADMIN — PIPERACILLIN AND TAZOBACTAM 3.38 G: 3; .375 INJECTION, POWDER, LYOPHILIZED, FOR SOLUTION INTRAVENOUS at 23:51

## 2021-07-04 RX ADMIN — Medication 3 ML: at 08:51

## 2021-07-04 RX ADMIN — ACETAMINOPHEN 650 MG: 325 TABLET ORAL at 23:50

## 2021-07-04 RX ADMIN — ACETAMINOPHEN ORAL SOLUTION 650 MG: 650 SOLUTION ORAL at 11:34

## 2021-07-04 RX ADMIN — PIPERACILLIN AND TAZOBACTAM 3.38 G: 3; .375 INJECTION, POWDER, LYOPHILIZED, FOR SOLUTION INTRAVENOUS at 17:37

## 2021-07-04 RX ADMIN — DOCUSATE SODIUM 100 MG: 100 CAPSULE, LIQUID FILLED ORAL at 08:50

## 2021-07-04 RX ADMIN — AMLODIPINE BESYLATE 10 MG: 5 TABLET ORAL at 08:50

## 2021-07-04 RX ADMIN — ANASTROZOLE 1 MG: 1 TABLET, COATED ORAL at 08:50

## 2021-07-04 RX ADMIN — IPRATROPIUM BROMIDE AND ALBUTEROL SULFATE 3 ML: 2.5; .5 SOLUTION RESPIRATORY (INHALATION) at 15:50

## 2021-07-04 RX ADMIN — BENZONATATE 100 MG: 100 CAPSULE ORAL at 11:28

## 2021-07-04 RX ADMIN — LOSARTAN POTASSIUM 100 MG: 50 TABLET, FILM COATED ORAL at 08:50

## 2021-07-04 NOTE — SIGNIFICANT NOTE
Tried to explain to patient that treatment would not make her nose bleed, but she refused to take it anyway.

## 2021-07-04 NOTE — PLAN OF CARE
Goal Outcome Evaluation:      Patient has been coughing quite a bit and while coughing is getting choked up causing her to have emesis. Patient is still very wheezy. Giving pt tessalon for the cough. Has been having BM, have help any kind of laxatives.

## 2021-07-04 NOTE — THERAPY EVALUATION
Patient Name: Diane Guna  : 1940    MRN: [id number]                              Today's Date: 2021       Admit Date: 2021    Visit Dx:     ICD-10-CM ICD-9-CM   1. Pneumonia of left lung due to infectious organism, unspecified part of lung  J18.9 486   2. General weakness  R53.1 780.79   3. 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 (CMS/[facility])   • Acquired hypothyroidism   • Type 2 diabetes mellitus without complication, without long-term current use of insulin (CMS/[facility])   • Insomnia   • Postmenopausal status   • Shoulder pain   • Overactive bladder   • Vitamin B 12 deficiency   • Seasonal allergies   • Absolute anemia   • Vitamin D deficiency   • LIBBY (obstructive sleep apnea)   • Nocturnal hypoxia   • Acute respiratory failure with hypoxia and hypercapnia (CMS/[facility])   • Abdominal pain   • Chronic respiratory failure with hypoxia, on home O2 therapy (CMS/HCC)   • Pneumonia of left lung due to infectious organism   • Acute UTI (urinary tract infection)   • Leukocytosis   • CKD (chronic kidney disease) stage 3, GFR 30-59 ml/min (CMS/[facility])     Past Medical History:   Diagnosis Date   • Acute bronchitis due to human metapneumovirus 2020   • Anxiety disorder    • Arthritis    • Breast cancer (CMS/HCC) 2019    Invasive ductal carcinoma   • Chronic lymphocytic leukemia (CLL), B-cell (CMS/HCC) 2019   • Depression    • Disease of thyroid gland    • Diverticulosis of colon 2018    Identified on colonoscopy   • Dysphagia 2020   • Dyspnea on exertion    • Hemorrhoid    • Hiatal hernia 2020   • Hiatal  hernia with GERD     large hiatal hernia with high-grade reflux on barium swallow; s/p laparoscopic fundoplication with gastropexy   • History of diabetes mellitus     no meds now   • Hyperlipidemia    • Hypertension    • Mycobacterium avium complex (CMS/HCC) 02/2019    aspiration pneumonia; completed abx therapy   • OAB (overactive bladder)    • Sleep apnea     daughter states pt does not have and doesn't have machine     Past Surgical History:   Procedure Laterality Date   • BLADDER SURGERY     • BRONCHOSCOPY N/A 9/13/2019    Procedure: BRONCHOSCOPY with bronchial washing;  Surgeon: Marnie Frankel MD;  Location: [facility] ENDOSCOPY;  Service: Pulmonary   • COLONOSCOPY  07/19/2018    severe diverticulosis   • CYST REMOVAL      Removed a fatty cyst off of her back   • ENDOSCOPY N/A 11/23/2020    Procedure: ESOPHAGOGASTRODUODENOSCOPY with dilatation (Bougie # 48, 50, 52, 54, 56, 58);  Surgeon: Den Plummer DO;  Location: [facility] ENDOSCOPY;  Service: General;  Laterality: N/A;  Post: large hiatal hernia, joshua's ulcers   • HIATAL HERNIA REPAIR N/A 12/30/2020    Procedure: Laparoscopic hiatal hernia repair with gastropexy;  Surgeon: Den Plummer DO;  Location: [facility] MAIN OR;  Service: General;  Laterality: N/A;   • MASTECTOMY Right 02/04/2019    Invasive ductal carcinoma   • TUBAL ABDOMINAL LIGATION       General Information     Row Name 07/04/21 1040          Physical Therapy Time and Intention    Document Type  evaluation  -     Mode of Treatment  physical therapy  -     Row Name 07/04/21 1040          General Information    Patient Profile Reviewed  yes  -     Prior Level of Function  independent:;ADL's RW  -     Existing Precautions/Restrictions  fall;oxygen therapy device and L/min  -     Barriers to Rehab  none identified  -     Row Name 07/04/21 1040          Living Environment    Lives With  spouse  -     Row Name 07/04/21 1040          Home Main Entrance    Number of Stairs, Main Entrance   five  -     Stair Railings, Main Entrance  railings safe and in good condition single rail  -     Row Name 07/04/21 1040          Stairs Within Home, Primary    Number of Stairs, Within Home, Primary  none  -[facility] Name 07/04/21 1040          Cognition    Orientation Status (Cognition)  oriented to;person;place;verbal cues/prompts needed for orientation  -     Row Name 07/04/21 1040          Safety Issues, Functional Mobility    Safety Issues Affecting Function (Mobility)  insight into deficits/self-awareness  -     Impairments Affecting Function (Mobility)  balance;endurance/activity tolerance;strength;shortness of breath;postural/trunk control  -       User Key  (r) = Recorded By, (t) = Taken By, (c) = Cosigned By    Initials Name Provider Type     Kinga Delatorre PT Physical Therapist        Mobility     Row Name 07/04/21 1041          Bed Mobility    Bed Mobility  supine-sit  -     Supine-Sit [place] (Bed Mobility)  minimum assist (75% patient effort);1 person assist  -     Assistive Device (Bed Mobility)  bed rails;head of bed elevated  -[facility] Name 07/04/21 1041          Bed-Chair Transfer    Bed-Chair [place] (Transfers)  minimum assist (75% patient effort);1 person assist  -     Assistive Device (Bed-Chair Transfers)  walker, front-wheeled  -[facility] Name 07/04/21 1041          Sit-Stand Transfer    Sit-Stand [place] (Transfers)  minimum assist (75% patient effort);1 person assist;verbal cues  -     Assistive Device (Sit-Stand Transfers)  walker, front-wheeled  -     Row Name 07/04/21 1041          Gait/Stairs (Locomotion)    [place] Level (Gait)  not tested  -       User Key  (r) = Recorded By, (t) = Taken By, (c) = Cosigned By    Initials Name Provider Type     Kinga Delatorre PT Physical Therapist        Obj/Interventions     Row Name 07/04/21 1042          Range of Motion Comprehensive    General Range of Motion  bilateral upper extremity ROM  WFL;bilateral lower extremity ROM L  -JH     Row Name 07/04/21 1042          Strength Comprehensive (MMT)    Comment, General Manual Muscle Testing (MMT) Assessment  UEs 4/5, B hips 3/5, knees 4/5  -JH     Row Name 07/04/21 1042          Balance    Balance Assessment  sitting static balance;sitting dynamic balance;standing static balance;standing dynamic balance  -     Static Sitting Balance  WFL  -     Dynamic Sitting Balance  mild impairment  -     Static Standing Balance  mild impairment;supported;standing  -     Dynamic Standing Balance  moderate impairment;supported;standing  -JH     Row Name 07/04/21 1042          Sensory Assessment (Somatosensory)    Sensory Assessment (Somatosensory)  sensation intact  -       User Key  (r) = Recorded By, (t) = Taken By, (c) = Cosigned By    Initials Name Provider Type    Kinga Gage, PT Physical Therapist        Goals/Plan     Row Name 07/04/21 1045          Bed Mobility Goal 1 (PT)    Activity/Assistive Device (Bed Mobility Goal 1, PT)  bed mobility activities, all  -     [place] Level/Cues Needed (Bed Mobility Goal 1, PT)  independent  -     Time Frame (Bed Mobility Goal 1, PT)  long term goal (LTG);2 weeks  -JH     Row Name 07/04/21 1045          Transfer Goal 1 (PT)    Activity/Assistive Device (Transfer Goal 1, PT)  sit-to-stand/stand-to-sit;bed-to-chair/chair-to-bed;walker, rolling  -     [place] Level/Cues Needed (Transfer Goal 1, PT)  modified independence  -     Time Frame (Transfer Goal 1, PT)  long term goal (LTG);2 weeks  -JH     Row Name 07/04/21 1045          Gait Training Goal 1 (PT)    Activity/Assistive Device (Gait Training Goal 1, PT)  gait (walking locomotion);assistive device use;walker, rolling  -     [place] Level (Gait Training Goal 1, PT)  contact guard assist  -     Distance (Gait Training Goal 1, PT)  100'  -JH     Time Frame (Gait Training Goal 1, PT)  long term goal (LTG);2 weeks  -JH     Row Name  07/04/21 1045          Stairs Goal 1 (PT)    Activity/Assistive Device (Stairs Goal 1, PT)  stairs, all skills  -     [place] Level/Cues Needed (Stairs Goal 1, PT)  contact guard assist  -     Number of Stairs (Stairs Goal 1, PT)  5  -     Time Frame (Stairs Goal 1, PT)  long term goal (LTG);2 weeks  -       User Key  (r) = Recorded By, (t) = Taken By, (c) = Cosigned By    Initials Name Provider Type     Kinga Delatorre, PT Physical Therapist        Clinical Impression     [facility] Name 07/04/21 1043          Pain    Additional Documentation  Pain Scale: FACES Pre/Post-Treatment (Group)  -[facility] Name 07/04/21 1043          Pain Scale: FACES Pre/Post-Treatment    Pain: FACES Scale, Pretreatment  0-->no hurt  -     Posttreatment Pain Rating  0-->no hurt  -[facility] Name 07/04/21 1043          Plan of Care Review    Plan of Care Reviewed With  patient  -     Outcome Summary  80yo female admitted with falls and weakness, fell x 2 and unable to get up.  d/o PNA.  Pt is very weak, normally ind at home using her walker.  Not safe for home in current condition and recommend cont PT and IP rehab at d/c.  -[facility] Name 07/04/21 1043          Therapy Assessment/Plan (PT)    Rehab Potential (PT)  good, to achieve stated therapy goals  -     Criteria for Skilled Interventions Met (PT)  yes;skilled treatment is necessary  -[facility] Name 07/04/21 1043          Vital Signs    O2 Delivery Pre Treatment  nasal cannula 5L, normally only home O2 at night  -     O2 Delivery Intra Treatment  nasal cannula  -     O2 Delivery Post Treatment  nasal cannula  -[facility] Name 07/04/21 1043          Positioning and Restraints    Pre-Treatment Position  in bed  -     Post Treatment Position  chair  -     In Chair  notified nsg;sitting;call light within reach;encouraged to call for assist;exit alarm on  -       User Key  (r) = Recorded By, (t) = Taken By, (c) = Cosigned By    Initials Name Provider Type      Kinga Delatorre PT Physical Therapist        Outcome Measures    No documentation.       Physical Therapy Education                 Title: PT OT SLP Therapies (Done)     Topic: Physical Therapy (Done)     Point: Mobility training (Done)     Learning Progress Summary           Patient Acceptance, E,TB, VU by  at 7/4/2021 1046                   Point: Precautions (Done)     Learning Progress Summary           Patient Acceptance, E,TB, VU by  at 7/4/2021 1046                               User Key     Initials Effective Dates Name Provider Type Discipline     06/16/21 -  Kinga Delatorre PT Physical Therapist PT              PT Recommendation and Plan  Planned Therapy Interventions (PT): balance training, bed mobility training, gait training, transfer training, stair training, strengthening, patient/family education  Plan of Care Reviewed With: patient  Outcome Summary: 80yo female admitted with falls and weakness, fell x 2 and unable to get up.  d/o PNA.  Pt is very weak, normally ind at home using her walker.  Not safe for home in current condition and recommend cont PT and IP rehab at d/c.     Time Calculation:   PT Charges     Row Name 07/04/21 1046             Time Calculation    Start Time  0935  -      Stop Time  0950  -      Time Calculation (min)  15 min  -      PT Received On  07/04/21  -      PT - Next Appointment  07/05/21  -      PT Goal Re-Cert Due Date  07/18/21  -         Time Calculation- PT    Total Timed Code Minutes- PT  0 minute(s)  -        User Key  (r) = Recorded By, (t) = Taken By, (c) = Cosigned By    Initials Name Provider Type     Kinga Delatorre PT Physical Therapist        Therapy Charges for Today     Code Description Service Date Service Provider Modifiers Qty    [id number] HC PT EVAL MOD COMPLEXITY 2 7/4/2021 Kinga Delatorre PT GP 1               Kinga Delatorre PT  7/4/2021

## 2021-07-04 NOTE — PROGRESS NOTES
"      [facility] Medicine Services Daily Progress Note      Hospitalist Team  LOS 1 days      Patient Care Team:  Jemima Fontaine MD as PCP - General (Family Medicine)    Patient Location: 242/1      Subjective   Subjective     Chief Complaint / Subjective  Chief Complaint   Patient presents with   • Fall     pt fell at home, c/o generalized pain, c/o increased SOA as well         Brief Synopsis of Hospital Course/HPI  Very pleasant 81-year-old female lives at home with her  presents with complaints of generalized weakness and falls.  Reports she fell last night and could not get up and laid on the floor until her  got home.  She reports she fell again today and was too weak to get up.  She denies hitting her head or loss of consciousness.  She reports she cannot even crawl to get to the phones.  She reports home oxygen use 2 L at night.  She denies any increased shortness of air cough or fever.  She does report nasal congestion.  She has a past medical history of MAC infection.  She denies any chest pain, dizziness, nausea vomiting or diarrhea.  Chest x-ray today per radiology:     \"Increased left lower lung airspace opacities, which may be due to  atelectasis and/or pneumonia.  \"     Her temperature was 100.9 Fahrenheit, she is not tachycardic or hypotensive.  She is currently 93% on 2 L nasal cannula.  WBC is elevated at 28.6 lactic acid is 0.6, hemoglobin 10.5 and stable.  Troponin less than 0.010 and proBNP 1231.  CK is 241 creatinine is 1.36 (last creatinine between 1.06 and 1.6 per labs) review of records shows she had a 2D echo January 6, 2021 which showed an estimated ejection fraction of 65% with mild right ventricular cavity dilation.  She has a past medical history of breast cancer, hyperlipidemia, hypertension, chronic kidney disease stage III, anxiety depression.  Urinalysis today shows 1+ bacteria 6-12 WBCs and small leukocytes.  She was started on 1 g IV " "ceftriaxone and 100 mg IV doxycycline twice daily and urine and blood cultures pending.  She was given an albuterol treatment in ED, and one-time dose of Lasix.  She will be admitted for further evaluation and treatment.  Respiratory panel including COVID-19 was negative today.        7/3- patient reports she still feel very weak. She is coughing up clear mucus.     7/4- Patient reports she had an episode of emesis last night and subsequent nose bleeding. She has refused all of her breathing treatments as feels this is what caused her episode. Tolerated breakfast this am without issue.       Review of Systems   Constitutional: Positive for malaise/fatigue.   Cardiovascular: Negative for chest pain and palpitations.   Respiratory: Positive for cough, shortness of breath, sputum production and wheezing.    Musculoskeletal: Positive for falls.   Gastrointestinal: Negative for abdominal pain, nausea and vomiting.   Neurological: Positive for weakness.         Objective   Objective      Vital Signs  Temp:  [97.6 °F (36.4 °C)-100.7 °F (38.2 °C)] 97.8 °F (36.6 °C)  Heart Rate:  [79-90] 79  Resp:  [20-26] 20  BP: (123-162)/(67-79) 138/67  Oxygen Therapy  SpO2: 92 %  Pulse Oximetry Type: Intermittent  Device (Oxygen Therapy): nasal cannula  Flow (L/min): 4  Flowsheet Rows      First Filed Value   Admission Height  157.5 cm (62\") Documented at 07/02/2021 1617   Admission Weight  81.6 kg (180 lb) Documented at 07/02/2021 1617        Intake & Output (last 3 days)       07/01 0701 - 07/02 0700 07/02 0701 - 07/03 0700 07/03 0701 - 07/04 0700 07/04 0701 - 07/05 0700    P.O.   840     IV Piggyback  1000      Total Intake(mL/kg)  1000 (12.3) 840 (9.8)     Urine (mL/kg/hr)   700 (0.3)     Emesis/NG output   0     Stool   0     Total Output   700     Net  +1000 +140             Stool Unmeasured Occurrence   2 x     Emesis Unmeasured Occurrence   2 x         Lines, Drains & Airways    Active LDAs     Name:   Placement date:   Placement " time:   Site:   Days:    Peripheral IV 07/02/21 1825 Left;Posterior Hand   07/02/21 1825    Hand   less than 1    Peripheral IV 07/01/21 2200 Anterior;Left;Upper Arm   07/01/21 2200 placed in ER    Arm   1                  Physical Exam:    Physical Exam  Vitals reviewed.   Constitutional:       General: She is not in acute distress.     Appearance: She is obese.   HENT:      Head: Normocephalic and atraumatic.      Mouth/Throat:      Mouth: Mucous membranes are moist.   Cardiovascular:      Rate and Rhythm: Normal rate and regular rhythm.      Heart sounds: No murmur heard.     Pulmonary:      Breath sounds: Wheezing and rhonchi present.   Abdominal:      General: Bowel sounds are normal. There is no distension.      Palpations: Abdomen is soft.      Tenderness: There is no abdominal tenderness.   Musculoskeletal:      Right lower leg: No edema.      Left lower leg: No edema.   Skin:     General: Skin is warm and dry.      Findings: No rash.   Neurological:      Mental Status: She is alert.   Psychiatric:         Mood and Affect: Mood normal.         Behavior: Behavior normal.               Procedures:              Results Review:     I reviewed the patient's new clinical results.      Lab Results (last 24 hours)     Procedure Component Value Units Date/Time    Hemoglobin A1c [[id number]]  (Abnormal) Collected: 07/04/21 0350    Specimen: Blood Updated: 07/04/21 1118     Hemoglobin A1C 6.6 %     Narrative:      Hemoglobin A1C Reference Range:    <5.7 %        Normal  5.7-6.4 %     Increased risk for diabetes  > 6.4 %        Diabetes       These guidelines have been recommended by the American Diabetic Association for Hgb A1c.      The following 2010 guidelines have been recommended by the American Diabetes Association for Hemoglobin A1c.    HBA1c 5.7-6.4% Increased risk for future diabetes (pre-diabetes)  HBA1c     >6.4% Diabetes      Respiratory Culture - Sputum, Cough [[id number]] Collected: 07/03/21 1308     Specimen: Sputum from Cough Updated: 07/04/21 0732     Gram Stain Many (4+) WBCs per low power field      Many (4+) Epithelial cells per low power field      Moderate (3+) Mixed bacterial morphotypes seen on Gram Stain    Blood Culture - Blood, Arm, Left [[id number]]  (Abnormal) Collected: 07/02/21 1828    Specimen: Blood from Arm, Left Updated: 07/04/21 0625     Blood Culture Abnormal Stain     Gram Stain Aerobic Bottle Gram positive cocci in clusters    Manual Differential [[id number]]  (Abnormal) Collected: 07/04/21 0350    Specimen: Blood Updated: 07/04/21 0525     Neutrophil % 49.0 %      Lymphocyte % 48.0 %      Monocyte % 3.0 %      Neutrophils Absolute 10.24 10*3/mm3      Lymphocytes Absolute 10.03 10*3/mm3      Monocytes Absolute 0.63 10*3/mm3      RBC Morphology Normal     WBC Morphology Normal     Platelet Morphology Normal    Narrative:      Reviewed by Pathologist within the past 30 days on 7/6/2021 .      CBC & Differential [[id number]]  (Abnormal) Collected: 07/04/21 0350    Specimen: Blood Updated: 07/04/21 0525    Narrative:      The following orders were created for panel order CBC & Differential.  Procedure                               Abnormality         Status                     ---------                               -----------         ------                     Scan Slide[[id number]]                                       Final result               CBC Auto Differential[[id number]]        Abnormal            Final result                 Please view results for these tests on the individual orders.    Scan Slide [[id number]] Collected: 07/04/21 0350    Specimen: Blood Updated: 07/04/21 0525     Scan Slide --     Comment: See Manual Differential Results       CBC Auto Differential [[id number]]  (Abnormal) Collected: 07/04/21 0350    Specimen: Blood Updated: 07/04/21 0525     WBC 20.90 10*3/mm3      RBC 3.22 10*6/mm3      Hemoglobin 8.8 g/dL      Hematocrit 27.5 %      MCV 85.6 fL      MCH 27.3 pg       MCHC 31.9 g/dL      RDW 15.2 %      RDW-SD 45.5 fl      MPV 9.3 fL      Platelets 163 10*3/mm3     Narrative:      The previously reported component NRBC is no longer being reported. Previous result was 0.0 /100 WBC (Reference Range: 0.0-0.2 /100 WBC) on 7/4/2021 at 0412 EDT.    Basic Metabolic Panel [[id number]]  (Abnormal) Collected: 07/04/21 0350    Specimen: Blood Updated: 07/04/21 0429     Glucose 140 mg/dL      BUN 36 mg/dL      Creatinine 1.75 mg/dL      Sodium 136 mmol/L      Potassium 3.5 mmol/L      Chloride 103 mmol/L      CO2 20.0 mmol/L      Calcium 8.3 mg/dL      eGFR Non African Amer 28 mL/min/1.73      BUN/Creatinine Ratio 20.6     Anion Gap 13.0 mmol/L     Narrative:      GFR Normal >60  Chronic Kidney Disease <60  Kidney Failure <15      Magnesium [[id number]]  (Abnormal) Collected: 07/04/21 0350    Specimen: Blood Updated: 07/04/21 0429     Magnesium 1.5 mg/dL     Calcium, Ionized [[id number]]  (Abnormal) Collected: 07/04/21 0350    Specimen: Blood Updated: 07/04/21 0420     Ionized Calcium 1.19 mmol/L     Urine Culture - Urine, Urine, Catheter [[id number]]  (Abnormal)  (Susceptibility) Collected: 07/02/21 1921    Specimen: Urine, Catheter Updated: 07/04/21 0132     Urine Culture >100,000 CFU/mL Escherichia coli    Susceptibility      Escherichia coli      JUAN      Ampicillin Susceptible      Ampicillin + Sulbactam Susceptible      Cefazolin Susceptible      Cefepime Susceptible      Ceftazidime Susceptible      Ceftriaxone Susceptible      Gentamicin Susceptible      Levofloxacin Susceptible      Nitrofurantoin Susceptible      Piperacillin + Tazobactam Susceptible      Tetracycline Susceptible      Trimethoprim + Sulfamethoxazole Susceptible               Linear View                   Blood Culture - Blood, Leg, Left [[id number]] Collected: 07/02/21 1938    Specimen: Blood from Leg, Left Updated: 07/03/21 1945     Blood Culture No growth at 24 hours    Blood Culture ID, PCR - Blood, Arm, Left  [[id number]]  (Abnormal) Collected: 07/02/21 1828    Specimen: Blood from Arm, Left Updated: 07/03/21 1855     BCID, PCR Staphylococcus spp, not aureus. Identification by BCID PCR.     BOTTLE TYPE Aerobic Bottle        Hemoglobin A1C   Date Value Ref Range Status   07/04/2021 6.6 (H) 3.5 - 5.6 % Final               Lab Results   Component Value Date    LIPASE 12 (L) 07/02/2021     Lab Results   Component Value Date    CHOL 374 (H) 01/03/2020    TRIG 311 (H) 01/03/2020    HDL 50 01/03/2020     (H) 01/03/2020       Lab Results   Lab Value Date/Time    FINALDX  12/31/2020 0205     Leukocytosis with absolute atypical lymphocytosis  Anemia  No blasts identified  If CBC indices persist/worsen, consider flow cytometry or other studies as clinically indicated to exclude a lymphoproliferative disorder      FINALDX  02/06/2020 1405     Absolute atypical lymphocytosis.  Anemia.  No blasts identified.  Recommend clinical follow-up and additional studies to include flow cytometry as clinically indicated to exclude CLL/SLL or other lymphoproliferative disorder.         Microbiology Results (last 10 days)     Procedure Component Value - Date/Time    S. Pneumo Ag Urine or CSF - Urine, Urine, Clean Catch [[id number]]  (Normal) Collected: 07/03/21 1312    Lab Status: Final result Specimen: Urine, Clean Catch Updated: 07/03/21 1441     Strep Pneumo Ag Negative    Legionella Antigen, Urine - Urine, Urine, Clean Catch [[id number]]  (Normal) Collected: 07/03/21 1312    Lab Status: Final result Specimen: Urine, Clean Catch Updated: 07/03/21 1441     LEGIONELLA ANTIGEN, URINE Negative    Respiratory Culture - Sputum, Cough [[id number]] Collected: 07/03/21 1308    Lab Status: Preliminary result Specimen: Sputum from Cough Updated: 07/04/21 0732     Gram Stain Many (4+) WBCs per low power field      Many (4+) Epithelial cells per low power field      Moderate (3+) Mixed bacterial morphotypes seen on Gram Stain    MRSA Screen, PCR  (Inpatient) - Swab, Nares [[id number]]  (Normal) Collected: 07/03/21 1143    Lab Status: Final result Specimen: Swab from Nares Updated: 07/03/21 1542     MRSA PCR No MRSA Detected    Respiratory Panel PCR w/COVID-19(SARS-CoV-2) LESLEE/VIVIANE/PAVAN/PAD/COR/MAD/JUAN DAVID In-House, NP Swab in UTM/VTM, 3-4 HR TAT - Swab, Nasopharynx [[id number]]  (Normal) Collected: 07/02/21 2216    Lab Status: Final result Specimen: Swab from Nasopharynx Updated: 07/02/21 2309     ADENOVIRUS, PCR Not Detected     Coronavirus 229E Not Detected     Coronavirus HKU1 Not Detected     Coronavirus NL63 Not Detected     Coronavirus OC43 Not Detected     COVID19 Not Detected     Human Metapneumovirus Not Detected     Human Rhinovirus/Enterovirus Not Detected     Influenza A PCR Not Detected     Influenza B PCR Not Detected     Parainfluenza Virus 1 Not Detected     Parainfluenza Virus 2 Not Detected     Parainfluenza Virus 3 Not Detected     Parainfluenza Virus 4 Not Detected     RSV, PCR Not Detected     Bordetella pertussis pcr Not Detected     Bordetella parapertussis PCR Not Detected     Chlamydophila pneumoniae PCR Not Detected     Mycoplasma pneumo by PCR Not Detected    Narrative:      In the setting of a positive respiratory panel with a viral infection PLUS a negative procalcitonin without other underlying concern for bacterial infection, consider observing off antibiotics or discontinuation of antibiotics and continue supportive care. If the respiratory panel is positive for atypical bacterial infection (Bordetella pertussis, Chlamydophila pneumoniae, or Mycoplasma pneumoniae), consider antibiotic de-escalation to target atypical bacterial infection.    Blood Culture - Blood, Leg, Left [[id number]] Collected: 07/02/21 1938    Lab Status: Preliminary result Specimen: Blood from Leg, Left Updated: 07/03/21 1945     Blood Culture No growth at 24 hours    Urine Culture - Urine, Urine, Catheter [[id number]]  (Abnormal)  (Susceptibility) Collected:  07/02/21 1921    Lab Status: Final result Specimen: Urine, Catheter Updated: 07/04/21 0132     Urine Culture >100,000 CFU/mL Escherichia coli    Susceptibility      Escherichia coli      JUAN      Ampicillin Susceptible      Ampicillin + Sulbactam Susceptible      Cefazolin Susceptible      Cefepime Susceptible      Ceftazidime Susceptible      Ceftriaxone Susceptible      Gentamicin Susceptible      Levofloxacin Susceptible      Nitrofurantoin Susceptible      Piperacillin + Tazobactam Susceptible      Tetracycline Susceptible      Trimethoprim + Sulfamethoxazole Susceptible               Linear View                   Blood Culture - Blood, Arm, Left [[id number]]  (Abnormal) Collected: 07/02/21 1828    Lab Status: Preliminary result Specimen: Blood from Arm, Left Updated: 07/04/21 0625     Blood Culture Abnormal Stain     Gram Stain Aerobic Bottle Gram positive cocci in clusters    Blood Culture ID, PCR - Blood, Arm, Left [[id number]]  (Abnormal) Collected: 07/02/21 1828    Lab Status: Final result Specimen: Blood from Arm, Left Updated: 07/03/21 1855     BCID, PCR Staphylococcus spp, not aureus. Identification by BCID PCR.     BOTTLE TYPE Aerobic Bottle          ECG/EMG Results (most recent)     Procedure Component Value Units Date/Time    ECG 12 Lead [[id number]] Collected: 07/02/21 1637     Updated: 07/04/21 0947     QT Interval 322 ms     Narrative:      HEART RATE= 104  bpm  RR Interval= 580  ms  IN Interval= 123  ms  P Horizontal Axis= -27  deg  P Front Axis= 17  deg  QRSD Interval= 89  ms  QT Interval= 322  ms  QRS Axis= 48  deg  T Wave Axis= 63  deg  - OTHERWISE NORMAL ECG -  Sinus tachycardia  Low voltage, precordial leads  When compared with ECG of 30-Dec-2020 13:14:52,  Significant change in rhythm  Electronically Signed By: Holden Ortiz (Simeon) 04-Jul-2021 09:33:16  Date and Time of Study: 2021-07-02 16:37:22          Results for orders placed during the hospital encounter of 12/30/20    Duplex Venous Lower  Extremity - Bilateral CAR    Interpretation Summary  · Normal bilateral lower extremity venous duplex scan.      Results for orders placed during the hospital encounter of 12/30/20    Adult Transthoracic Echo Complete W/ Cont if Necessary Per Protocol    Interpretation Summary  · Estimated left ventricular EF = 65% Estimated left ventricular EF was in agreement with the calculated left ventricular EF. Left ventricular systolic function is normal.  · There is mainly affecting the non-coronary and left coronary cusp(s).  · The right ventricular cavity is mildly dilated.  · Estimated right ventricular systolic pressure from tricuspid regurgitation is normal (<35 mmHg).      XR Chest 1 View    Result Date: 7/2/2021  Increased left lower lung airspace opacities, which may be due to atelectasis and/or pneumonia.  Electronically Signed By-Jacquie Arroyo MD On:7/2/2021 5:58 PM This report was finalized on [id number] by  Jacquie Arroyo MD.          Xrays, labs reviewed personally by physician.    Medication Review:   I have reviewed the patient's current medication list      Scheduled Meds  amLODIPine, 10 mg, Oral, Daily  anastrozole, 1 mg, Oral, Daily  budesonide-formoterol, 2 puff, Inhalation, BID - RT  cetirizine, 10 mg, Oral, Nightly  chlorthalidone, 25 mg, Oral, Daily  docusate sodium, 100 mg, Oral, BID  enoxaparin, 40 mg, Subcutaneous, Daily  [START ON 7/5/2021] ferrous sulfate, 324 mg, Oral, Once per day on Mon Wed Fri  guaiFENesin, 1,200 mg, Oral, Q12H  ipratropium-albuterol, 3 mL, Nebulization, 4x Daily - RT  levothyroxine, 100 mcg, Oral, Q AM  losartan, 100 mg, Oral, Daily  mirtazapine, 15 mg, Oral, Nightly  pantoprazole, 40 mg, Oral, QAM AC  piperacillin-tazobactam, 3.375 g, Intravenous, Q8H  rOPINIRole, 0.25 mg, Oral, Nightly  rosuvastatin, 10 mg, Oral, Nightly  sertraline, 150 mg, Oral, Nightly  sodium chloride, 3 mL, Intravenous, Q12H  vitamin D3, 5,000 Units, Oral, Daily        Meds Infusions  sodium  chloride, 50 mL/hr, Last Rate: 50 mL/hr (07/04/21 0607)        Meds PRN  •  acetaminophen **OR** acetaminophen **OR** acetaminophen  •  benzonatate  •  ipratropium-albuterol  •  melatonin  •  metoclopramide  •  nitroglycerin  •  ondansetron **OR** ondansetron  •  [COMPLETED] Insert peripheral IV **AND** sodium chloride  •  sodium chloride        Assessment/Plan   Assessment/Plan     Active Hospital Problems    Diagnosis  POA   • Acute UTI (urinary tract infection) [N39.0]  Yes   • Leukocytosis [D72.829]  Yes   • CKD (chronic kidney disease) stage 3, GFR 30-59 ml/min (CMS/HCC) [N18.30]  Yes   • Pneumonia of left lung due to infectious organism [J18.9]  Yes      Resolved Hospital Problems   No resolved problems to display.       MEDICAL DECISION MAKING COMPLEXITY BY PROBLEM:     Sepsis d/t LLL pneumonia complicated by acute hypoxic respiratory failure   - dc Rocephin and doxycycline  - blood cx- NGTD  - viral panel negative  - start IV Zosyn  - strep and legionella antigens negative   - sputum cx- pending  - add duonebs scheduled and prn  - continue Symbicort and mucinex  - if no improvement consider CT chest and possible pulmonology consult given history of MAC    Acute UTI d/t E coli - POA  - on antibiotics as above     Generalized weakness with falls  - likely d/t above   - consult PT/OT    Hypocalcemia  - replace IV 7/3/21  - repeat in am    Obesity  -  on lifestyle changes as appropriate      Chronic HFpEF  - stable, not in exacerbation  -  EF 65% per last heart cath January 6, 2021, proBNP 1231    Hypothyroidism  - on home levothyroxine     Anxiety depression  - on home Remeron, Zoloft     Hyperlipidemia   - on statin     Acute Kidney Injury on Chronic kidney disease stage III   - baseline Cr 1-1.3  - continue IV fluids     New onset diabetes Mellitus type 2  - Hgb A1c 6.6  - add consistent carb diet  - consult diabetes educator for now tomorrow     Normocytic anemia   - hemoglobin 10.5 stable       Essential hypertension  - controlled on home Norvasc, chlorthalidone, losartan  - monitor bp with routine vs and make adjustments as needed     GERD  - on PPI and Reglan     Restless leg syndrome  - on ropinirole     History breast cancer  - on anastrozole     allergic rhinitis  - on cetirizine     Dietary supplementation  - on vitamin D3, FE     Stool softener on Colace      DVT Prophylaxis  - enoxaparin             VTE Prophylaxis -   Mechanical Order History:     None      Pharmalogical Order History:      Ordered     Dose Route Frequency Stop    07/02/21 2010  enoxaparin (LOVENOX) syringe 40 mg      40 mg SC Daily --                  Code Status -   Code Status and Medical Interventions:   Ordered at: 07/02/21 2010     Code Status:    CPR     Medical Interventions (Level of Support Prior to Arrest):    Full           Discharge Planning  Inpatient rehab         Electronically signed by Violet Fletcher DO, 07/04/21, 12:46 EDT.  [facility] Hospitalist Team

## 2021-07-04 NOTE — PLAN OF CARE
Goal Outcome Evaluation:  Plan of Care Reviewed With: patient           Outcome Summary: 82yo female admitted with falls and weakness, fell x 2 and unable to get up.  d/o PNA.  Pt is very weak, normally ind at home using her walker.  Not safe for home in current condition and recommend cont PT and IP rehab at d/c.

## 2021-07-05 ENCOUNTER — APPOINTMENT (OUTPATIENT)
Dept: CT IMAGING | Facility: HOSPITAL | Age: 81
End: 2021-07-05

## 2021-07-05 LAB
ANION GAP SERPL CALCULATED.3IONS-SCNC: 15 MMOL/L (ref 5–15)
BACTERIA SPEC AEROBE CULT: ABNORMAL
BUN SERPL-MCNC: 35 MG/DL (ref 8–23)
BUN/CREAT SERPL: 23.6 (ref 7–25)
BURR CELLS BLD QL SMEAR: ABNORMAL
CA-I SERPL ISE-MCNC: 1.18 MMOL/L (ref 1.2–1.3)
CALCIUM SPEC-SCNC: 8.2 MG/DL (ref 8.6–10.5)
CHLORIDE SERPL-SCNC: 106 MMOL/L (ref 98–107)
CO2 SERPL-SCNC: 17 MMOL/L (ref 22–29)
CREAT SERPL-MCNC: 1.48 MG/DL (ref 0.57–1)
DEPRECATED RDW RBC AUTO: 46.4 FL (ref 37–54)
EOSINOPHIL # BLD MANUAL: 0.18 10*3/MM3 (ref 0–0.4)
EOSINOPHIL NFR BLD MANUAL: 1 % (ref 0.3–6.2)
ERYTHROCYTE [DISTWIDTH] IN BLOOD BY AUTOMATED COUNT: 15.3 % (ref 12.3–15.4)
GFR SERPL CREATININE-BSD FRML MDRD: 34 ML/MIN/1.73
GLUCOSE BLDC GLUCOMTR-MCNC: 116 MG/DL (ref 70–105)
GLUCOSE SERPL-MCNC: 131 MG/DL (ref 65–99)
GRAM STN SPEC: ABNORMAL
HCT VFR BLD AUTO: 28 % (ref 34–46.6)
HGB BLD-MCNC: 9 G/DL (ref 12–15.9)
ISOLATED FROM: ABNORMAL
LYMPHOCYTES # BLD MANUAL: 10.43 10*3/MM3 (ref 0.7–3.1)
LYMPHOCYTES NFR BLD MANUAL: 1 % (ref 5–12)
LYMPHOCYTES NFR BLD MANUAL: 57 % (ref 19.6–45.3)
MAGNESIUM SERPL-MCNC: 2.1 MG/DL (ref 1.6–2.4)
MCH RBC QN AUTO: 27.8 PG (ref 26.6–33)
MCHC RBC AUTO-ENTMCNC: 32 G/DL (ref 31.5–35.7)
MCV RBC AUTO: 86.8 FL (ref 79–97)
MONOCYTES # BLD AUTO: 0.18 10*3/MM3 (ref 0.1–0.9)
NEUTROPHILS # BLD AUTO: 7.5 10*3/MM3 (ref 1.7–7)
NEUTROPHILS NFR BLD MANUAL: 35 % (ref 42.7–76)
NEUTS BAND NFR BLD MANUAL: 6 % (ref 0–5)
PLAT MORPH BLD: NORMAL
PLATELET # BLD AUTO: 179 10*3/MM3 (ref 140–450)
PMV BLD AUTO: 9.5 FL (ref 6–12)
POTASSIUM SERPL-SCNC: 3.7 MMOL/L (ref 3.5–5.2)
RBC # BLD AUTO: 3.23 10*6/MM3 (ref 3.77–5.28)
SCAN SLIDE: NORMAL
SODIUM SERPL-SCNC: 138 MMOL/L (ref 136–145)
WBC # BLD AUTO: 18.3 10*3/MM3 (ref 3.4–10.8)
WBC MORPH BLD: NORMAL

## 2021-07-05 PROCEDURE — 97530 THERAPEUTIC ACTIVITIES: CPT

## 2021-07-05 PROCEDURE — 71250 CT THORAX DX C-: CPT

## 2021-07-05 PROCEDURE — G0108 DIAB MANAGE TRN  PER INDIV: HCPCS

## 2021-07-05 PROCEDURE — 82330 ASSAY OF CALCIUM: CPT | Performed by: INTERNAL MEDICINE

## 2021-07-05 PROCEDURE — 94799 UNLISTED PULMONARY SVC/PX: CPT

## 2021-07-05 PROCEDURE — 25010000002 ENOXAPARIN PER 10 MG: Performed by: NURSE PRACTITIONER

## 2021-07-05 PROCEDURE — 97116 GAIT TRAINING THERAPY: CPT

## 2021-07-05 PROCEDURE — 83735 ASSAY OF MAGNESIUM: CPT | Performed by: INTERNAL MEDICINE

## 2021-07-05 PROCEDURE — 25010000002 PIPERACILLIN SOD-TAZOBACTAM PER 1 G: Performed by: INTERNAL MEDICINE

## 2021-07-05 PROCEDURE — 82962 GLUCOSE BLOOD TEST: CPT

## 2021-07-05 PROCEDURE — 85007 BL SMEAR W/DIFF WBC COUNT: CPT | Performed by: INTERNAL MEDICINE

## 2021-07-05 PROCEDURE — 85025 COMPLETE CBC W/AUTO DIFF WBC: CPT | Performed by: INTERNAL MEDICINE

## 2021-07-05 PROCEDURE — 80048 BASIC METABOLIC PNL TOTAL CA: CPT | Performed by: INTERNAL MEDICINE

## 2021-07-05 PROCEDURE — 99232 SBSQ HOSP IP/OBS MODERATE 35: CPT | Performed by: HOSPITALIST

## 2021-07-05 RX ORDER — OXYCODONE HYDROCHLORIDE 5 MG/1
5 TABLET ORAL EVERY 4 HOURS PRN
Status: DISCONTINUED | OUTPATIENT
Start: 2021-07-05 | End: 2021-07-08 | Stop reason: HOSPADM

## 2021-07-05 RX ADMIN — ANASTROZOLE 1 MG: 1 TABLET, COATED ORAL at 08:47

## 2021-07-05 RX ADMIN — BUDESONIDE AND FORMOTEROL FUMARATE DIHYDRATE 2 PUFF: 160; 4.5 AEROSOL RESPIRATORY (INHALATION) at 08:26

## 2021-07-05 RX ADMIN — CHLORTHALIDONE 25 MG: 25 TABLET ORAL at 08:47

## 2021-07-05 RX ADMIN — IPRATROPIUM BROMIDE AND ALBUTEROL SULFATE 3 ML: 2.5; .5 SOLUTION RESPIRATORY (INHALATION) at 04:49

## 2021-07-05 RX ADMIN — LOSARTAN POTASSIUM 100 MG: 50 TABLET, FILM COATED ORAL at 08:47

## 2021-07-05 RX ADMIN — IPRATROPIUM BROMIDE AND ALBUTEROL SULFATE 3 ML: 2.5; .5 SOLUTION RESPIRATORY (INHALATION) at 16:13

## 2021-07-05 RX ADMIN — MIRTAZAPINE 15 MG: 15 TABLET, FILM COATED ORAL at 21:03

## 2021-07-05 RX ADMIN — IPRATROPIUM BROMIDE AND ALBUTEROL SULFATE 3 ML: 2.5; .5 SOLUTION RESPIRATORY (INHALATION) at 08:24

## 2021-07-05 RX ADMIN — ENOXAPARIN SODIUM 40 MG: 40 INJECTION SUBCUTANEOUS at 17:32

## 2021-07-05 RX ADMIN — Medication 3 ML: at 21:03

## 2021-07-05 RX ADMIN — ACETAMINOPHEN 650 MG: 325 TABLET ORAL at 04:31

## 2021-07-05 RX ADMIN — CETIRIZINE HYDROCHLORIDE 10 MG: 10 TABLET, FILM COATED ORAL at 21:03

## 2021-07-05 RX ADMIN — BUDESONIDE AND FORMOTEROL FUMARATE DIHYDRATE 2 PUFF: 160; 4.5 AEROSOL RESPIRATORY (INHALATION) at 18:57

## 2021-07-05 RX ADMIN — ROSUVASTATIN CALCIUM 10 MG: 10 TABLET, FILM COATED ORAL at 21:05

## 2021-07-05 RX ADMIN — ROPINIROLE HYDROCHLORIDE 0.25 MG: 0.25 TABLET, FILM COATED ORAL at 21:03

## 2021-07-05 RX ADMIN — PIPERACILLIN AND TAZOBACTAM 3.38 G: 3; .375 INJECTION, POWDER, LYOPHILIZED, FOR SOLUTION INTRAVENOUS at 23:42

## 2021-07-05 RX ADMIN — IPRATROPIUM BROMIDE AND ALBUTEROL SULFATE 3 ML: 2.5; .5 SOLUTION RESPIRATORY (INHALATION) at 18:50

## 2021-07-05 RX ADMIN — DOCUSATE SODIUM 100 MG: 100 CAPSULE, LIQUID FILLED ORAL at 21:03

## 2021-07-05 RX ADMIN — Medication 5 MG: at 23:41

## 2021-07-05 RX ADMIN — AMLODIPINE BESYLATE 10 MG: 5 TABLET ORAL at 08:47

## 2021-07-05 RX ADMIN — FERROUS SULFATE TAB EC 324 MG (65 MG FE EQUIVALENT) 324 MG: 324 (65 FE) TABLET DELAYED RESPONSE at 08:47

## 2021-07-05 RX ADMIN — Medication 3 ML: at 08:48

## 2021-07-05 RX ADMIN — PIPERACILLIN AND TAZOBACTAM 3.38 G: 3; .375 INJECTION, POWDER, LYOPHILIZED, FOR SOLUTION INTRAVENOUS at 17:32

## 2021-07-05 RX ADMIN — OXYCODONE 5 MG: 5 TABLET ORAL at 23:42

## 2021-07-05 RX ADMIN — Medication 5000 UNITS: at 08:47

## 2021-07-05 RX ADMIN — GUAIFENESIN 1200 MG: 600 TABLET, EXTENDED RELEASE ORAL at 08:47

## 2021-07-05 RX ADMIN — BENZONATATE 100 MG: 100 CAPSULE ORAL at 04:31

## 2021-07-05 RX ADMIN — LEVOTHYROXINE SODIUM 100 MCG: 0.1 TABLET ORAL at 06:16

## 2021-07-05 RX ADMIN — OXYCODONE 5 MG: 5 TABLET ORAL at 18:02

## 2021-07-05 RX ADMIN — SERTRALINE HYDROCHLORIDE 150 MG: 100 TABLET ORAL at 21:03

## 2021-07-05 RX ADMIN — GUAIFENESIN 1200 MG: 600 TABLET, EXTENDED RELEASE ORAL at 21:17

## 2021-07-05 RX ADMIN — SODIUM CHLORIDE 50 ML/HR: 9 INJECTION, SOLUTION INTRAVENOUS at 21:17

## 2021-07-05 RX ADMIN — DOCUSATE SODIUM 100 MG: 100 CAPSULE, LIQUID FILLED ORAL at 08:48

## 2021-07-05 RX ADMIN — PANTOPRAZOLE SODIUM 40 MG: 40 TABLET, DELAYED RELEASE ORAL at 08:47

## 2021-07-05 RX ADMIN — PIPERACILLIN AND TAZOBACTAM 3.38 G: 3; .375 INJECTION, POWDER, LYOPHILIZED, FOR SOLUTION INTRAVENOUS at 08:51

## 2021-07-05 RX ADMIN — IPRATROPIUM BROMIDE AND ALBUTEROL SULFATE 3 ML: 2.5; .5 SOLUTION RESPIRATORY (INHALATION) at 12:13

## 2021-07-05 NOTE — CASE MANAGEMENT/SOCIAL WORK
Discharge Planning Assessment  [facility]     Patient Name: Diane Guan  MRN: [id number]  Today's Date: 7/5/2021    Admit Date: 7/2/2021    Discharge Needs Assessment     Row Name 07/05/21 3498       Living Environment    Lives With  spouse    Name(s) of Who Lives With Patient  Doron    Unique Family Situation  Daughter Marianna at bedside.    Current Living Arrangements  home/apartment/condo    Primary Care Provided by  spouse/significant other;child(marco)    Provides Primary Care For  no one, unable/limited ability to care for self    Family Caregiver if Needed  child(marco), adult;spouse    Family Caregiver Names  Marianna Sal, daughter and another daughter, states has lots of family support.    Able to Return to Prior Arrangements  yes       Transition Planning    Patient/Family Anticipates Transition to  home with family    Patient/Family Anticipated Services at Transition  none    Transportation Anticipated  family or friend will provide       Discharge Needs Assessment    Readmission Within the Last 30 Days  no previous admission in last 30 days    Equipment Currently Used at Home  oxygen;cane, straight;walker, rolling;hospital bed;commode;nebulizer;shower chair Home O2 [employer] 2L pnc.    Concerns to be Addressed  discharge planning    Concerns Comments  PT recommends inpt rehab. Pt declines inpt rehab or home health.    Anticipated Changes Related to Illness  none    Equipment Needed After Discharge  none    Discharge Coordination/Progress  DC Plan: Return home with family, declines inpt rehab or HH. Pt has home O2 w/portable O2 and nebulizer. IV antibiotics.        Discharge Plan     Row Name 07/05/21 4568       Plan    Plan  DC Plan: Return home with family, declines inpt rehab or HH. Pt has home O2 w/portable O2 and nebulizer. IV antibiotics.    Plan Comments  PT recommends inpt rehab. Pt will have transporation home per family. Minimal chores at home. Pharmacy [facility].        Continued Care and  Services - Admitted Since 7/2/2021    Coordination has not been started for this encounter.         Demographic Summary     Row Name 07/05/21 1623       General Information    Admission Type  inpatient    Arrived From  emergency department    Required Notices Provided  Important Message from Medicare    Referral Source  admission list    Reason for Consult  discharge planning    Preferred Language  English     Used During This Interaction  no        Functional Status     Row Name 07/05/21 1623       Functional Status    Usual Activity Tolerance  fair    Current Activity Tolerance  fair       Functional Status, IADL    Medications  assistive equipment    Meal Preparation  assistive equipment and person    Housekeeping  assistive equipment and person    Laundry  completely dependent    Shopping  completely dependent       Mental Status    General Appearance WDL  WDL       Mental Status Summary    Recent Changes in Mental Status/Cognitive Functioning  no changes        Met with patient in room wearing PPE: mask, goggles.      Maintained distance greater than six feet and spent less than 15 minutes in the room.               Tejal Justin RN

## 2021-07-05 NOTE — CONSULTS
"Diabetes Education  Assessment/Teaching    Patient Name:  Diane Guan  YOB: 1940  MRN: [id number]  Admit Date:  7/2/2021      Assessment Date:  7/5/2021    Most Recent Value   General Information    Referral From:  MD order [MD consult for newly diagnosed.]   Height  157.5 cm (62\")   Height Method  Stated   Weight  85.3 kg (188 lb 0.8 oz)   Weight Method  Bed scale   Pregnancy Assessment   Diabetes History   What type of diabetes do you have?  Type 2   Length of Diabetes Diagnosis  Newly diagnosed <6 months [Diagnosed this admission.]   Have you had diabetes education/teaching in the past?  no   Do you test your blood sugar at home?  no   Have you had high blood sugar? (>140mg/dl)  yes   How often do you have high blood sugar?  unknown   When was your last high blood sugar?  admission blood sugar 162   Education Preferences   What areas of diabetes would you like to learn about?  diabetes complications, diet information, understanding diabetes, testing my blood sugar at home   Nutrition Information   Assessment Topics   Healthy Eating - Assessment  Needs education   Problem Solving - Assessment  Needs education   Reducing Risk - Assessment  Needs education   Monitoring - Assessment  Needs education   DM Goals   Healthy Eating - Goal  Today   Problem Solving - Goal  Today   Reducing Risk - Goal  Today   Monitoring - Goal  Today            Most Recent Value   DM Education Needs   Meter  Meter provided   Meter Type  Accuchek [Accu-chek Guide Me glucometer given with return demonstration completed by patient using the lancing device, inserting the test strip into the glucometer, and applying blood to the test strip. Blood sugar was 135.]   Frequency of Testing  Daily [Log book given to patient with discussion on checking blood sugar daily varying the times before meals and at bedtime.]   Medication  -- [Patient stated that she was on Metformin but the doctor d'marcos it about a year ago.]   Reducing " Risks  A1C testing, Lipids, Blood pressure, Eye exam, Dental exam, Foot care, Immunizations, Cardiovascular, Neuropathy, Retinopathy [On 7/4/2021 A1c was 6.6%.]   Healthy Eating  Basic meal plan provided   Discharge Plan  Home   Motivation  Moderate   Teaching Method  Discussion, Demonstration, Handouts, Teach back, Explanation   Patient Response  Demonstrates adequately, Verbalized understanding            Other Comments:  A1c info sheet given with discussion on A1c target and healthy blood sugar range. Discussed with patient criteria for diagnosis of diabetes. First Steps book to managing diabetes given to patient with discussion on diabetes and diagnosis. Discussed how diabetes puts you at higher risk for heart attack, stroke, kidney failure, blindness, and neuropathy.  Also discussed the importance of yearly eye exams, dental exams, regular follow-up with PCP to check blood pressure, lipids, kidney function, liver function, and to keep up-to-date on vaccinations. Foot care discussed with checking feet daily and wearing shoes when out of bed. Handouts given with discussion on 1 serving of carbs being = 15 grams, being allowed 4 servings  of carbs per meal, counting carbs, reading food labels, low carb snacks, and meal planning. Discussed drinking unsweetened beverages and limiting juice intake.  Prescriptions started in discharge orders for lancets, test strips, and alcohol wipes. Patient has no further questions or concerns related to diabetes at this time.          Electronically signed by:  Kate Trejo RN  07/05/21 11:48 EDT

## 2021-07-05 NOTE — PROGRESS NOTES
"      [facility] Medicine Services Daily Progress Note      Hospitalist Team  LOS 2 days      Patient Care Team:  Jemima Fontaine MD as PCP - General (Family Medicine)    Patient Location: 242/1      Subjective   Subjective     Chief Complaint / Subjective  Chief Complaint   Patient presents with   • Fall     pt fell at home, c/o generalized pain, c/o increased SOA as well         Brief Synopsis of Hospital Course/HPI    The patient is an 81 years old female with history of anxiety, depression, hyperlipidemia, hypertension, RLS, breast cancer s/p right lumpectomy, CKD stage III, 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    Apparently the patient had fallen at home while ambulating with her walker without associated loss of consciousness.  She claimed that her lower extremities gave out because of weakness.      Chest x-ray in the ED suggested right lower lobe pneumonia. SIRS was met with fever of 100.9F and leukocytosis 28.6.      Date::     7/5/21:  at the bedside.  Patient falling a lot at home.  Uses a walker.  Laceration of nose and right forearm.  Has been oxygen since right lumpectomy.  No history of chemo or radiation.  On Zosyn.      Review of Systems   All other systems reviewed and are negative.        Objective   Objective      Vital Signs  Temp:  [97.6 °F (36.4 °C)-98.5 °F (36.9 °C)] 97.6 °F (36.4 °C)  Heart Rate:  [68-82] 75  Resp:  [18-22] 22  BP: (115-148)/(55-75) 144/68  Oxygen Therapy  SpO2: 92 %  Pulse Oximetry Type: Intermittent  Device (Oxygen Therapy): nasal cannula  Flow (L/min): 4  Oxygen Concentration (%): 36  Flowsheet Rows      First Filed Value   Admission Height  157.5 cm (62\") Documented at 07/02/2021 1617   Admission Weight  81.6 kg (180 lb) Documented at 07/02/2021 1617        Intake & Output (last 3 days)       07/02 0701 - 07/03 0700 07/03 0701 - 07/04 0700 07/04 0701 - 07/05 0700 07/05 0701 - 07/06 0700    P.O.  840  100    IV Piggyback 1000  100     " Total Intake(mL/kg) 1000 (12.3) 840 (9.8) 100 (1.2) 100 (1.2)    Urine (mL/kg/hr)  700 (0.3)      Emesis/NG output  0      Stool  0      Total Output  700      Net +1000 +140 +100 +100            Urine Unmeasured Occurrence   1 x 2 x    Stool Unmeasured Occurrence  2 x 1 x 2 x    Emesis Unmeasured Occurrence  2 x          Lines, Drains & Airways    Active LDAs     Name:   Placement date:   Placement time:   Site:   Days:    Peripheral IV 07/05/21 1120 Anterior;Left Forearm   07/05/21    1120    Forearm   less than 1                  Physical Exam:    Physical Exam  HENT:      Head: Normocephalic.   Eyes:      Extraocular Movements: Extraocular movements intact.      Pupils: Pupils are equal, round, and reactive to light.   Cardiovascular:      Rate and Rhythm: Normal rate and regular rhythm.   Pulmonary:      Effort: Pulmonary effort is normal.   Abdominal:      General: Bowel sounds are normal.      Palpations: Abdomen is soft.   Musculoskeletal:         General: Normal range of motion.      Cervical back: Normal range of motion.   Skin:     General: Skin is warm.   Neurological:      Mental Status: She is alert and oriented to person, place, and time. Mental status is at baseline.   Psychiatric:         Mood and Affect: Mood normal.         Procedures:      Results Review:     I reviewed the patient's new clinical results.      Lab Results (last 24 hours)     Procedure Component Value Units Date/Time    Blood Culture - Blood, Arm, Left [[id number]]  (Abnormal) Collected: 07/02/21 1828    Specimen: Blood from Arm, Left Updated: 07/05/21 1135     Blood Culture Staphylococcus, coagulase negative     Comment: Probable contaminant requires clinical correlation, susceptibility not performed unless requested by physician.          Isolated from Aerobic Bottle     Gram Stain Aerobic Bottle Gram positive cocci in clusters    Respiratory Culture - Sputum, Cough [[id number]] Collected: 07/03/21 1308    Specimen: Sputum from  Cough Updated: 07/05/21 0945     Respiratory Culture Moderate growth (3+) Normal Respiratory Leanna: NO S.aureus/MRSA or Pseudomonas aeruginosa     Gram Stain Many (4+) WBCs per low power field      Many (4+) Epithelial cells per low power field      Moderate (3+) Mixed bacterial morphotypes seen on Gram Stain    Manual Differential [[id number]]  (Abnormal) Collected: 07/05/21 0339    Specimen: Blood Updated: 07/05/21 0516     Neutrophil % 35.0 %      Lymphocyte % 57.0 %      Monocyte % 1.0 %      Eosinophil % 1.0 %      Bands %  6.0 %      Neutrophils Absolute 7.50 10*3/mm3      Lymphocytes Absolute 10.43 10*3/mm3      Monocytes Absolute 0.18 10*3/mm3      Eosinophils Absolute 0.18 10*3/mm3      Crenated RBC's Slight/1+     WBC Morphology Normal     Platelet Morphology Normal    Narrative:      Reviewed by Pathologist within the past 30 days on 7.6.21 .    CBC & Differential [[id number]]  (Abnormal) Collected: 07/05/21 0339    Specimen: Blood Updated: 07/05/21 0516    Narrative:      The following orders were created for panel order CBC & Differential.  Procedure                               Abnormality         Status                     ---------                               -----------         ------                     Scan Slide[[id number]]                                       Final result               CBC Auto Differential[[id number]]        Abnormal            Final result                 Please view results for these tests on the individual orders.    Scan Slide [[id number]] Collected: 07/05/21 0339    Specimen: Blood Updated: 07/05/21 0516     Scan Slide --     Comment: See Manual Differential Results       CBC Auto Differential [[id number]]  (Abnormal) Collected: 07/05/21 0339    Specimen: Blood Updated: 07/05/21 0516     WBC 18.30 10*3/mm3      RBC 3.23 10*6/mm3      Hemoglobin 9.0 g/dL      Hematocrit 28.0 %      MCV 86.8 fL      MCH 27.8 pg      MCHC 32.0 g/dL      RDW 15.3 %      RDW-SD 46.4 fl      MPV  9.5 fL      Platelets 179 10*3/mm3     Narrative:      The previously reported component NRBC is no longer being reported. Previous result was 0.1 /100 WBC (Reference Range: 0.0-0.2 /100 WBC) on 7/5/2021 at 0426 EDT.    Magnesium [[id number]]  (Normal) Collected: 07/05/21 0339    Specimen: Blood Updated: 07/05/21 0500     Magnesium 2.1 mg/dL     Basic Metabolic Panel [[id number]]  (Abnormal) Collected: 07/05/21 0339    Specimen: Blood Updated: 07/05/21 0455     Glucose 131 mg/dL      BUN 35 mg/dL      Creatinine 1.48 mg/dL      Sodium 138 mmol/L      Potassium 3.7 mmol/L      Chloride 106 mmol/L      CO2 17.0 mmol/L      Calcium 8.2 mg/dL      eGFR Non African Amer 34 mL/min/1.73      BUN/Creatinine Ratio 23.6     Anion Gap 15.0 mmol/L     Narrative:      GFR Normal >60  Chronic Kidney Disease <60  Kidney Failure <15      Calcium, Ionized [[id number]]  (Abnormal) Collected: 07/05/21 0339    Specimen: Blood Updated: 07/05/21 0448     Ionized Calcium 1.18 mmol/L     Blood Culture - Blood, Leg, Left [[id number]] Collected: 07/02/21 1938    Specimen: Blood from Leg, Left Updated: 07/04/21 1945     Blood Culture No growth at 2 days        Hemoglobin A1C   Date Value Ref Range Status   07/04/2021 6.6 (H) 3.5 - 5.6 % Final               Lab Results   Component Value Date    LIPASE 12 (L) 07/02/2021     Lab Results   Component Value Date    CHOL 374 (H) 01/03/2020    TRIG 311 (H) 01/03/2020    HDL 50 01/03/2020     (H) 01/03/2020       Lab Results   Lab Value Date/Time    FINALDX  12/31/2020 0205     Leukocytosis with absolute atypical lymphocytosis  Anemia  No blasts identified  If CBC indices persist/worsen, consider flow cytometry or other studies as clinically indicated to exclude a lymphoproliferative disorder      FINALDX  02/06/2020 1405     Absolute atypical lymphocytosis.  Anemia.  No blasts identified.  Recommend clinical follow-up and additional studies to include flow cytometry as clinically indicated to  exclude CLL/SLL or other lymphoproliferative disorder.         Microbiology Results (last 10 days)     Procedure Component Value - Date/Time    S. Pneumo Ag Urine or CSF - Urine, Urine, Clean Catch [[id number]]  (Normal) Collected: 07/03/21 1312    Lab Status: Final result Specimen: Urine, Clean Catch Updated: 07/03/21 1441     Strep Pneumo Ag Negative    Legionella Antigen, Urine - Urine, Urine, Clean Catch [[id number]]  (Normal) Collected: 07/03/21 1312    Lab Status: Final result Specimen: Urine, Clean Catch Updated: 07/03/21 1441     LEGIONELLA ANTIGEN, URINE Negative    Respiratory Culture - Sputum, Cough [[id number]] Collected: 07/03/21 1308    Lab Status: Preliminary result Specimen: Sputum from Cough Updated: 07/05/21 0945     Respiratory Culture Moderate growth (3+) Normal Respiratory Leanna: NO S.aureus/MRSA or Pseudomonas aeruginosa     Gram Stain Many (4+) WBCs per low power field      Many (4+) Epithelial cells per low power field      Moderate (3+) Mixed bacterial morphotypes seen on Gram Stain    MRSA Screen, PCR (Inpatient) - Swab, Nares [[id number]]  (Normal) Collected: 07/03/21 1143    Lab Status: Final result Specimen: Swab from Nares Updated: 07/03/21 1542     MRSA PCR No MRSA Detected    Respiratory Panel PCR w/COVID-19(SARS-CoV-2) LESLEE/VIVIANE/PAVAN/PAD/COR/MAD/JUAN DAVID In-House, NP Swab in UTM/VTM, 3-4 HR TAT - Swab, Nasopharynx [[id number]]  (Normal) Collected: 07/02/21 2216    Lab Status: Final result Specimen: Swab from Nasopharynx Updated: 07/02/21 2309     ADENOVIRUS, PCR Not Detected     Coronavirus 229E Not Detected     Coronavirus HKU1 Not Detected     Coronavirus NL63 Not Detected     Coronavirus OC43 Not Detected     COVID19 Not Detected     Human Metapneumovirus Not Detected     Human Rhinovirus/Enterovirus Not Detected     Influenza A PCR Not Detected     Influenza B PCR Not Detected     Parainfluenza Virus 1 Not Detected     Parainfluenza Virus 2 Not Detected     Parainfluenza Virus 3 Not Detected      Parainfluenza Virus 4 Not Detected     RSV, PCR Not Detected     Bordetella pertussis pcr Not Detected     Bordetella parapertussis PCR Not Detected     Chlamydophila pneumoniae PCR Not Detected     Mycoplasma pneumo by PCR Not Detected    Narrative:      In the setting of a positive respiratory panel with a viral infection PLUS a negative procalcitonin without other underlying concern for bacterial infection, consider observing off antibiotics or discontinuation of antibiotics and continue supportive care. If the respiratory panel is positive for atypical bacterial infection (Bordetella pertussis, Chlamydophila pneumoniae, or Mycoplasma pneumoniae), consider antibiotic de-escalation to target atypical bacterial infection.    Blood Culture - Blood, Leg, Left [[id number]] Collected: 07/02/21 1938    Lab Status: Preliminary result Specimen: Blood from Leg, Left Updated: 07/04/21 1945     Blood Culture No growth at 2 days    Urine Culture - Urine, Urine, Catheter [[id number]]  (Abnormal)  (Susceptibility) Collected: 07/02/21 1921    Lab Status: Final result Specimen: Urine, Catheter Updated: 07/04/21 0132     Urine Culture >100,000 CFU/mL Escherichia coli    Susceptibility      Escherichia coli      JUAN      Ampicillin Susceptible      Ampicillin + Sulbactam Susceptible      Cefazolin Susceptible      Cefepime Susceptible      Ceftazidime Susceptible      Ceftriaxone Susceptible      Gentamicin Susceptible      Levofloxacin Susceptible      Nitrofurantoin Susceptible      Piperacillin + Tazobactam Susceptible      Tetracycline Susceptible      Trimethoprim + Sulfamethoxazole Susceptible               Linear View                   Blood Culture - Blood, Arm, Left [[id number]]  (Abnormal) Collected: 07/02/21 1828    Lab Status: Final result Specimen: Blood from Arm, Left Updated: 07/05/21 1135     Blood Culture Staphylococcus, coagulase negative     Comment: Probable contaminant requires clinical correlation,  susceptibility not performed unless requested by physician.          Isolated from Aerobic Bottle     Gram Stain Aerobic Bottle Gram positive cocci in clusters    Blood Culture ID, PCR - Blood, Arm, Left [[id number]]  (Abnormal) Collected: 07/02/21 1828    Lab Status: Final result Specimen: Blood from Arm, Left Updated: 07/03/21 1855     BCID, PCR Staphylococcus spp, not aureus. Identification by BCID PCR.     BOTTLE TYPE Aerobic Bottle          ECG/EMG Results (most recent)     Procedure Component Value Units Date/Time    ECG 12 Lead [[id number]] Collected: 07/02/21 1637     Updated: 07/04/21 0947     QT Interval 322 ms     Narrative:      HEART RATE= 104  bpm  RR Interval= 580  ms  SD Interval= 123  ms  P Horizontal Axis= -27  deg  P Front Axis= 17  deg  QRSD Interval= 89  ms  QT Interval= 322  ms  QRS Axis= 48  deg  T Wave Axis= 63  deg  - OTHERWISE NORMAL ECG -  Sinus tachycardia  Low voltage, precordial leads  When compared with ECG of 30-Dec-2020 13:14:52,  Significant change in rhythm  Electronically Signed By: Holden Ortiz (Simeon) 04-Jul-2021 09:33:16  Date and Time of Study: 2021-07-02 16:37:22          Results for orders placed during the hospital encounter of 12/30/20    Duplex Venous Lower Extremity - Bilateral CAR    Interpretation Summary  · Normal bilateral lower extremity venous duplex scan.      Results for orders placed during the hospital encounter of 12/30/20    Adult Transthoracic Echo Complete W/ Cont if Necessary Per Protocol    Interpretation Summary  · Estimated left ventricular EF = 65% Estimated left ventricular EF was in agreement with the calculated left ventricular EF. Left ventricular systolic function is normal.  · There is mainly affecting the non-coronary and left coronary cusp(s).  · The right ventricular cavity is mildly dilated.  · Estimated right ventricular systolic pressure from tricuspid regurgitation is normal (<35 mmHg).      XR Chest 1 View    Result Date:  7/2/2021  Increased left lower lung airspace opacities, which may be due to atelectasis and/or pneumonia.  Electronically Signed By-Jacquie Arroyo MD On:7/2/2021 5:58 PM This report was finalized on [id number] by  Jacquie Arroyo MD.          Xrays, labs reviewed personally by physician.    Medication Review:   I have reviewed the patient's current medication list      Scheduled Meds  amLODIPine, 10 mg, Oral, Daily  anastrozole, 1 mg, Oral, Daily  budesonide-formoterol, 2 puff, Inhalation, BID - RT  cetirizine, 10 mg, Oral, Nightly  chlorthalidone, 25 mg, Oral, Daily  docusate sodium, 100 mg, Oral, BID  enoxaparin, 40 mg, Subcutaneous, Daily  ferrous sulfate, 324 mg, Oral, Once per day on Mon Wed Fri  guaiFENesin, 1,200 mg, Oral, Q12H  ipratropium-albuterol, 3 mL, Nebulization, 4x Daily - RT  levothyroxine, 100 mcg, Oral, Q AM  losartan, 100 mg, Oral, Daily  mirtazapine, 15 mg, Oral, Nightly  pantoprazole, 40 mg, Oral, QAM AC  piperacillin-tazobactam, 3.375 g, Intravenous, Q8H  rOPINIRole, 0.25 mg, Oral, Nightly  rosuvastatin, 10 mg, Oral, Nightly  sertraline, 150 mg, Oral, Nightly  sodium chloride, 3 mL, Intravenous, Q12H  vitamin D3, 5,000 Units, Oral, Daily        Meds Infusions  sodium chloride, 50 mL/hr, Last Rate: 50 mL/hr (07/04/21 0607)        Meds PRN  •  acetaminophen **OR** acetaminophen **OR** acetaminophen  •  benzonatate  •  ipratropium-albuterol  •  melatonin  •  metoclopramide  •  nitroglycerin  •  ondansetron **OR** ondansetron  •  [COMPLETED] Insert peripheral IV **AND** sodium chloride  •  sodium chloride        Assessment/Plan   Assessment/Plan     Active Hospital Problems    Diagnosis  POA   • Acute UTI (urinary tract infection) [N39.0]  Yes   • Leukocytosis [D72.829]  Yes   • CKD (chronic kidney disease) stage 3, GFR 30-59 ml/min (CMS/HCC) [N18.30]  Yes   • Pneumonia of left lung due to infectious organism [J18.9]  Yes      Resolved Hospital Problems   No resolved problems to display.        MEDICAL DECISION MAKING COMPLEXITY BY PROBLEM:     Sepsis secondary to LLL pneumonia:  -Rocephin and doxycycline discontinued  -Continue Zosyn  -CT chest ordered 7/5/2021    Acute on chronic hypoxemic respiratory failure due to pneumonia:  -Titrate pulse ox> 92%    Acute UTI d/t E coli - POA  - on antibiotics as above      Generalized weakness with falls:  -Likely secondary to acute illness/infection  -consulted PT/OT--> recommended inpatient rehab     Hypocalcemia: Repleted  - replace IV 7/3/21     Obesity  -  on lifestyle changes as appropriate      Chronic HFpEF:  - stable, not in exacerbation  -TTE 1/6/2021 --> LVEF 65%      Hypothyroidism  - on home levothyroxine     Anxiety/depression: Controlled  - on home Remeron, Zoloft     Hyperlipidemia:  -Continue statin     Acute Kidney Injury on Chronic kidney disease stage III:  - baseline Cr 1-1.3  - continue IV fluids      New onset diabetes Mellitus type 2:  - Hgb A1c 6.6  - add consistent carb diet  - consult diabetes educator      Normocytic anemia:  - hemoglobin 10.5      Essential hypertension  - controlled on home Norvasc, chlorthalidone, losartan     GERD  - on PPI and Reglan     Restless leg syndrome  - on ropinirole     History breast cancer s/p right lumpectomy:  - on anastrozole     allergic rhinitis: Controlled  - on cetirizine     Chronic constipation:  -Continue stool softeners        VTE Prophylaxis -   Mechanical Order History:     None      Pharmalogical Order History:      Ordered     Dose Route Frequency Stop    07/02/21 2010  enoxaparin (LOVENOX) syringe 40 mg      40 mg SC Daily --                  Code Status -   Code Status and Medical Interventions:   Ordered at: 07/02/21 2010     Code Status:    CPR     Medical Interventions (Level of Support Prior to Arrest):    Full       This patient has been examined wearing appropriate Personal Protective Equipment and discussed with nursing. 07/05/21        Discharge  Planning  defer        Electronically signed by Martinez Hitchcock DO, 07/05/21, 13:14 EDT.  [facility] Hospitalist Team

## 2021-07-05 NOTE — PLAN OF CARE
Goal Outcome Evaluation:              Outcome Summary: Patient still c/o of cough and weakness.  Will continue to monitor

## 2021-07-05 NOTE — PLAN OF CARE
Pt able to progress toward ambulation this session. Very weak and deconditioned, endurance is limited. Requiring up to Mod A to complete functional transfers. Recommending IP rehab, however pt is declining and would like to d/c home. HHPT would be recommended on d/c home.

## 2021-07-05 NOTE — THERAPY TREATMENT NOTE
Subjective: Pt agreeable to therapeutic plan of care. Agree's that she feels better when she is up and out of bed. Per daughter, pt ambulates with fwd flexion over RWX and has several obstacles in her bedroom that she falls over frequently.     Objective:     Bed mobility - Min-A  Transfers - CGA, Mod-A and with rolling walker dependent on height of surface from which she is transferring.   Ambulation - 20 feet Min-A + assist from daughter with IV pole/O2.     Pain: 4 VAS L Flank/Rib pain. Attributing this to PNA of L lung. RN advised.     VITALS: HR 80's and SpO2 fluctuating between 89-92% on 4L supplemental O2    Education: Provided education on importance of mobility and skilled verbal / tactile cueing throughout intervention.     Assessment: Diane Guan presents with functional mobility impairments which indicate the need for skilled intervention. Tolerating session today without incident. She is able to progress toward gait training with Min A from therapist for proper use of RWX and safety. She is requiring up to Mod A for transfers, but this is certainly dependent on surface from which she is transferring from. Will continue to follow and progress as tolerated.     Plan/Recommendations:   Pt would benefit from Inpatient Rehabilitation placement at discharge from facility and requires no DME at discharge.However, daughter is reporting that pt has hospital bed, RWX, shower chair, BSC and other DME at home to facilitate care at home with HHPT and 24/7 assist from family.     Basic Mobility 6-click:  Rollin = Total, A lot = 2, A little = 3; 4 = None  Supine>Sit:   1 = Total, A lot = 2, A little = 3; 4 = None   Sit>Stand with arms:  1 = Total, A lot = 2, A little = 3; 4 = None  Bed>Chair:   1 = Total, A lot = 2, A little = 3; 4 = None  Ambulate in room:  1 = Total, A lot = 2, A little = 3; 4 = None  3-5 Steps with railin = Total, A lot = 2, A little = 3; 4 = None  Score: 15    Modified  [place]: N/A = No pre-op stroke/TIA    Post-Tx Position: Up in Chair and Call light and personal items within reach, daughter present, refusing chair alarm. RN notified.     PPE: gloves, surgical mask, eyewear protection

## 2021-07-05 NOTE — CASE MANAGEMENT/SOCIAL WORK
Continued Stay Note   Hoang     Patient Name: Diane Guan  MRN: [id number]  Today's Date: 7/5/2021    Admit Date: 7/2/2021    Discharge Plan     Row Name 07/05/21 0943       Plan    Plan  PT recommends inpt rehab. Will require pre-cert for rehab. PASRR is approved.     Phone communication or documentation only - no physical contact with patient or family.    [facility]    Office: [phone]  Cell: [phone]  Fax: [phone]  E-mail: [email or web address]

## 2021-07-06 LAB
ANION GAP SERPL CALCULATED.3IONS-SCNC: 13 MMOL/L (ref 5–15)
BACTERIA SPEC RESP CULT: NORMAL
BUN SERPL-MCNC: 30 MG/DL (ref 8–23)
BUN/CREAT SERPL: 22.4 (ref 7–25)
BURR CELLS BLD QL SMEAR: ABNORMAL
CALCIUM SPEC-SCNC: 9.2 MG/DL (ref 8.6–10.5)
CHLORIDE SERPL-SCNC: 108 MMOL/L (ref 98–107)
CLUMPED PLATELETS: PRESENT
CO2 SERPL-SCNC: 20 MMOL/L (ref 22–29)
CREAT SERPL-MCNC: 1.34 MG/DL (ref 0.57–1)
CRP SERPL-MCNC: 21.13 MG/DL (ref 0–0.5)
DEPRECATED RDW RBC AUTO: 48.1 FL (ref 37–54)
EOSINOPHIL # BLD MANUAL: 0.1 10*3/MM3 (ref 0–0.4)
EOSINOPHIL NFR BLD MANUAL: 1 % (ref 0.3–6.2)
ERYTHROCYTE [DISTWIDTH] IN BLOOD BY AUTOMATED COUNT: 15.7 % (ref 12.3–15.4)
GFR SERPL CREATININE-BSD FRML MDRD: 38 ML/MIN/1.73
GLUCOSE SERPL-MCNC: 114 MG/DL (ref 65–99)
GRAM STN SPEC: NORMAL
HCT VFR BLD AUTO: 30.6 % (ref 34–46.6)
HGB BLD-MCNC: 9.6 G/DL (ref 12–15.9)
LAB AP CASE REPORT: NORMAL
LYMPHOCYTES # BLD MANUAL: 6.39 10*3/MM3 (ref 0.7–3.1)
LYMPHOCYTES NFR BLD MANUAL: 2 % (ref 5–12)
LYMPHOCYTES NFR BLD MANUAL: 62 % (ref 19.6–45.3)
MCH RBC QN AUTO: 27.7 PG (ref 26.6–33)
MCHC RBC AUTO-ENTMCNC: 31.4 G/DL (ref 31.5–35.7)
MCV RBC AUTO: 88.3 FL (ref 79–97)
MONOCYTES # BLD AUTO: 0.21 10*3/MM3 (ref 0.1–0.9)
NEUTROPHILS # BLD AUTO: 3.61 10*3/MM3 (ref 1.7–7)
NEUTROPHILS NFR BLD MANUAL: 29 % (ref 42.7–76)
NEUTS BAND NFR BLD MANUAL: 6 % (ref 0–5)
PATH REPORT.FINAL DX SPEC: NORMAL
PHOSPHATE SERPL-MCNC: 5.4 MG/DL (ref 2.5–4.5)
PLATELET # BLD AUTO: 218 10*3/MM3 (ref 140–450)
PMV BLD AUTO: 9.7 FL (ref 6–12)
POTASSIUM SERPL-SCNC: 4.2 MMOL/L (ref 3.5–5.2)
PROCALCITONIN SERPL-MCNC: 0.43 NG/ML (ref 0–0.25)
RBC # BLD AUTO: 3.46 10*6/MM3 (ref 3.77–5.28)
SCAN SLIDE: NORMAL
SMUDGE CELLS BLD QL SMEAR: ABNORMAL
SODIUM SERPL-SCNC: 141 MMOL/L (ref 136–145)
WBC # BLD AUTO: 10.3 10*3/MM3 (ref 3.4–10.8)

## 2021-07-06 PROCEDURE — 94799 UNLISTED PULMONARY SVC/PX: CPT

## 2021-07-06 PROCEDURE — 84100 ASSAY OF PHOSPHORUS: CPT | Performed by: HOSPITALIST

## 2021-07-06 PROCEDURE — 25010000002 PIPERACILLIN SOD-TAZOBACTAM PER 1 G: Performed by: INTERNAL MEDICINE

## 2021-07-06 PROCEDURE — 84145 PROCALCITONIN (PCT): CPT | Performed by: HOSPITALIST

## 2021-07-06 PROCEDURE — 85025 COMPLETE CBC W/AUTO DIFF WBC: CPT | Performed by: HOSPITALIST

## 2021-07-06 PROCEDURE — 99232 SBSQ HOSP IP/OBS MODERATE 35: CPT | Performed by: HOSPITALIST

## 2021-07-06 PROCEDURE — 86140 C-REACTIVE PROTEIN: CPT | Performed by: HOSPITALIST

## 2021-07-06 PROCEDURE — 80048 BASIC METABOLIC PNL TOTAL CA: CPT | Performed by: HOSPITALIST

## 2021-07-06 PROCEDURE — 25010000002 ENOXAPARIN PER 10 MG: Performed by: NURSE PRACTITIONER

## 2021-07-06 PROCEDURE — 85007 BL SMEAR W/DIFF WBC COUNT: CPT | Performed by: HOSPITALIST

## 2021-07-06 RX ORDER — BUDESONIDE 0.5 MG/2ML
0.5 INHALANT ORAL
Status: DISCONTINUED | OUTPATIENT
Start: 2021-07-06 | End: 2021-07-07

## 2021-07-06 RX ORDER — ARFORMOTEROL TARTRATE 15 UG/2ML
15 SOLUTION RESPIRATORY (INHALATION)
Status: DISCONTINUED | OUTPATIENT
Start: 2021-07-06 | End: 2021-07-08 | Stop reason: HOSPADM

## 2021-07-06 RX ORDER — IPRATROPIUM BROMIDE AND ALBUTEROL SULFATE 2.5; .5 MG/3ML; MG/3ML
3 SOLUTION RESPIRATORY (INHALATION)
Status: DISCONTINUED | OUTPATIENT
Start: 2021-07-06 | End: 2021-07-08 | Stop reason: HOSPADM

## 2021-07-06 RX ADMIN — CETIRIZINE HYDROCHLORIDE 10 MG: 10 TABLET, FILM COATED ORAL at 21:04

## 2021-07-06 RX ADMIN — Medication 3 ML: at 21:05

## 2021-07-06 RX ADMIN — LEVOTHYROXINE SODIUM 100 MCG: 0.1 TABLET ORAL at 06:11

## 2021-07-06 RX ADMIN — PIPERACILLIN AND TAZOBACTAM 3.38 G: 3; .375 INJECTION, POWDER, LYOPHILIZED, FOR SOLUTION INTRAVENOUS at 09:12

## 2021-07-06 RX ADMIN — Medication 5000 UNITS: at 09:06

## 2021-07-06 RX ADMIN — BUDESONIDE 0.5 MG: 0.5 SUSPENSION RESPIRATORY (INHALATION) at 19:54

## 2021-07-06 RX ADMIN — PANTOPRAZOLE SODIUM 40 MG: 40 TABLET, DELAYED RELEASE ORAL at 09:06

## 2021-07-06 RX ADMIN — ARFORMOTEROL TARTRATE 15 MCG: 15 SOLUTION RESPIRATORY (INHALATION) at 19:54

## 2021-07-06 RX ADMIN — IPRATROPIUM BROMIDE AND ALBUTEROL SULFATE 3 ML: 2.5; .5 SOLUTION RESPIRATORY (INHALATION) at 11:39

## 2021-07-06 RX ADMIN — IPRATROPIUM BROMIDE AND ALBUTEROL SULFATE 3 ML: 2.5; .5 SOLUTION RESPIRATORY (INHALATION) at 07:56

## 2021-07-06 RX ADMIN — ENOXAPARIN SODIUM 40 MG: 40 INJECTION SUBCUTANEOUS at 15:36

## 2021-07-06 RX ADMIN — Medication 3 ML: at 09:06

## 2021-07-06 RX ADMIN — DOCUSATE SODIUM 100 MG: 100 CAPSULE, LIQUID FILLED ORAL at 09:06

## 2021-07-06 RX ADMIN — ROSUVASTATIN CALCIUM 10 MG: 10 TABLET, FILM COATED ORAL at 21:03

## 2021-07-06 RX ADMIN — GUAIFENESIN 1200 MG: 600 TABLET, EXTENDED RELEASE ORAL at 09:06

## 2021-07-06 RX ADMIN — LOSARTAN POTASSIUM 100 MG: 50 TABLET, FILM COATED ORAL at 09:06

## 2021-07-06 RX ADMIN — ROPINIROLE HYDROCHLORIDE 0.25 MG: 0.25 TABLET, FILM COATED ORAL at 21:03

## 2021-07-06 RX ADMIN — IPRATROPIUM BROMIDE AND ALBUTEROL SULFATE 3 ML: 2.5; .5 SOLUTION RESPIRATORY (INHALATION) at 23:11

## 2021-07-06 RX ADMIN — IPRATROPIUM BROMIDE AND ALBUTEROL SULFATE 3 ML: 2.5; .5 SOLUTION RESPIRATORY (INHALATION) at 19:54

## 2021-07-06 RX ADMIN — MIRTAZAPINE 15 MG: 15 TABLET, FILM COATED ORAL at 21:03

## 2021-07-06 RX ADMIN — GUAIFENESIN 1200 MG: 600 TABLET, EXTENDED RELEASE ORAL at 21:03

## 2021-07-06 RX ADMIN — AMLODIPINE BESYLATE 10 MG: 5 TABLET ORAL at 09:06

## 2021-07-06 RX ADMIN — SERTRALINE HYDROCHLORIDE 150 MG: 100 TABLET ORAL at 21:04

## 2021-07-06 RX ADMIN — Medication 5 MG: at 21:03

## 2021-07-06 RX ADMIN — CHLORTHALIDONE 25 MG: 25 TABLET ORAL at 09:06

## 2021-07-06 RX ADMIN — ANASTROZOLE 1 MG: 1 TABLET, COATED ORAL at 09:06

## 2021-07-06 RX ADMIN — PIPERACILLIN AND TAZOBACTAM 3.38 G: 3; .375 INJECTION, POWDER, LYOPHILIZED, FOR SOLUTION INTRAVENOUS at 15:36

## 2021-07-06 RX ADMIN — OXYCODONE 5 MG: 5 TABLET ORAL at 21:03

## 2021-07-06 RX ADMIN — BUDESONIDE AND FORMOTEROL FUMARATE DIHYDRATE 2 PUFF: 160; 4.5 AEROSOL RESPIRATORY (INHALATION) at 07:57

## 2021-07-06 RX ADMIN — DOCUSATE SODIUM 100 MG: 100 CAPSULE, LIQUID FILLED ORAL at 21:04

## 2021-07-06 RX ADMIN — IPRATROPIUM BROMIDE AND ALBUTEROL SULFATE 3 ML: 2.5; .5 SOLUTION RESPIRATORY (INHALATION) at 14:20

## 2021-07-06 NOTE — CONSULTS
PULMONARY CRITICAL CARE CONSULT NOTE      PATIENT IDENTIFICATION:  Name: Diane Guan  MRN: [id number]  :  1940     Age: 81 y.o.  Sex: female        DATE OF CONSULTATION:  2021  PRIMARY CARE PHYSICIAN    Jemima Fontaine MD                  CHIEF COMPLAINT: Pneumonia    History of Present Illness:   Diane Guan is a 81 y.o. female was brought to the emergency room the hospital after being feeling weak over the last 4 weeks, increased tiredness and increased fatigue since she has multiple falls and she is to be on the floor for couple hours each time to help someone to get her up, beside that started having some more dyspnea exertion more feeling heaviness in her chest, coughing unable to bring any sputum's up no hemoptysis was feeling warm but no documentation of fever or chills no nausea no vomiting no aspiration no chest pain no irritation      Review of Systems:   Constitutional:  As above   Eyes: negative   ENT/oropharynx: negative   Cardiovascular: negative   Respiratory:  As above   Gastrointestinal: negative   Genitourinary: negative   Neurological:  As above   Musculoskeletal: negative   Integument/breast: negative   Endocrine: negative   Allergic/Immunologic: negative     Past Medical History:  Past Medical History:   Diagnosis Date   • Acute bronchitis due to human metapneumovirus 2020   • Anxiety disorder    • Arthritis    • Breast cancer (CMS/[facility]) 2019    Invasive ductal carcinoma   • Chronic lymphocytic leukemia (CLL), B-cell (CMS/[facility]) 2019   • Depression    • Disease of thyroid gland    • Diverticulosis of colon 2018    Identified on colonoscopy   • Dysphagia 2020   • Dyspnea on exertion    • Hemorrhoid    • Hiatal hernia 2020   • Hiatal hernia with GERD     large hiatal hernia with high-grade reflux on barium swallow; s/p laparoscopic fundoplication with gastropexy   • History of diabetes mellitus     no meds now   • Hyperlipidemia    • Hypertension    •  Mycobacterium avium complex (CMS/formerly [facility]) 02/2019    aspiration pneumonia; completed abx therapy   • OAB (overactive bladder)    • Sleep apnea     daughter states pt does not have and doesn't have machine       Past Surgical History:  Past Surgical History:   Procedure Laterality Date   • BLADDER SURGERY     • BRONCHOSCOPY N/A 9/13/2019    Procedure: BRONCHOSCOPY with bronchial washing;  Surgeon: Marnie Frankel MD;  Location: [facility] ENDOSCOPY;  Service: Pulmonary   • COLONOSCOPY  07/19/2018    severe diverticulosis   • CYST REMOVAL      Removed a fatty cyst off of her back   • ENDOSCOPY N/A 11/23/2020    Procedure: ESOPHAGOGASTRODUODENOSCOPY with dilatation (Bougie # 48, 50, 52, 54, 56, 58);  Surgeon: Den Plummer DO;  Location: [facility] ENDOSCOPY;  Service: General;  Laterality: N/A;  Post: large hiatal hernia, joshua's ulcers   • HIATAL HERNIA REPAIR N/A 12/30/2020    Procedure: Laparoscopic hiatal hernia repair with gastropexy;  Surgeon: Den Plummer DO;  Location: [facility] MAIN OR;  Service: General;  Laterality: N/A;   • MASTECTOMY Right 02/04/2019    Invasive ductal carcinoma   • TUBAL ABDOMINAL LIGATION          Family History:  Family History   Problem Relation Age of Onset   • Diabetes Mother    • Arthritis Other    • Heart disease Other         Social History:   Social History     Tobacco Use   • Smoking status: Former Smoker   • Smokeless tobacco: Never Used   Substance Use Topics   • Alcohol use: No        Allergies:  No Known Allergies    Home Meds:  Medications Prior to Admission   Medication Sig Dispense Refill Last Dose   • amLODIPine (NORVASC) 10 MG tablet Take 10 mg by mouth Daily.      • anastrozole (ARIMIDEX) 1 MG tablet TAKE 1 TABLET BY MOUTH EVERY DAY 90 tablet 1    • budesonide-formoterol (SYMBICORT) 160-4.5 MCG/ACT inhaler Inhale 2 puffs 2 (Two) Times a Day. Rinse mouth after each use. 10.2 g 5    • cetirizine (zyrTEC) 10 MG tablet Take 10 mg by mouth every night at bedtime.      •  "chlorthalidone (HYGROTON) 25 MG tablet TAKE 1 TABLET BY MOUTH EVERY DAY 90 tablet 1    • cholecalciferol (VITAMIN D3) 1.25 MG (86877 UT) capsule Take 50,000 Units by mouth Every 7 (Seven) Days.       • docusate sodium (COLACE) 100 MG capsule Take 100 mg by mouth 2 (Two) Times a Day.  3    • ferrous sulfate 324 (65 Fe) MG tablet delayed-release EC tablet Take 324 mg by mouth 3 (Three) Times a Week.      • ipratropium-albuterol (DUO-NEB) 0.5-2.5 mg/3 ml nebulizer Take 3 mL by nebulization Every 4 (Four) Hours As Needed for Wheezing.      • levothyroxine (SYNTHROID, LEVOTHROID) 100 MCG tablet Take 100 mcg by mouth Daily.      • losartan (COZAAR) 100 MG tablet TAKE 1 TABLET BY MOUTH EVERY DAY 90 tablet 1    • metoclopramide (REGLAN) 5 MG tablet TAKE 1 TABLET BY MOUTH 3 (THREE) TIMES A DAY AS NEEDED (FOR NAUSEA AND VOMITING). 270 tablet 1    • mirtazapine (REMERON) 15 MG tablet Take 1 tablet by mouth Every Night. 90 tablet 1    • pantoprazole (PROTONIX) 40 MG EC tablet TAKE 1 TABLET BY MOUTH EVERY MORNING BEFORE BREAKFAST. TAKE ON AN EMPTY STOMACH! 90 tablet 1    • rOPINIRole (REQUIP) 0.25 MG tablet Take 1 tablet by mouth Every Night. Take 1 hour before bedtime. 90 tablet 1    • rosuvastatin (CRESTOR) 10 MG tablet Take 1 tablet by mouth Every Night. 90 tablet 1    • sertraline (ZOLOFT) 100 MG tablet Take 1.5 tablets by mouth every night at bedtime. 135 tablet 1        Objective:  tMax 24 hrs: Temp (24hrs), Av.6 °F (36.4 °C), Min:97.3 °F (36.3 °C), Max:97.8 °F (36.6 °C)      Vitals Ranges:   Temp:  [97.3 °F (36.3 °C)-97.8 °F (36.6 °C)] 97.8 °F (36.6 °C)  Heart Rate:  [72-82] 82  Resp:  [18-24] 24  BP: (133-151)/(63-86) 133/86    Intake and Output Last 3 Shifts:   I/O last 3 completed shifts:  In: 420 [P.O.:220; IV Piggyback:200]  Out: -     Exam:  /86 (BP Location: Left arm, Patient Position: Lying)   Pulse 82   Temp 97.8 °F (36.6 °C) (Oral)   Resp 24   Ht 157.5 cm (62\")   Wt 88.5 kg (195 lb 1.7 oz)  "  SpO2 91%   BMI 35.69 kg/m²       07/04/21  0600 07/05/21  2300   Weight: 85.3 kg (188 lb 0.8 oz) 88.5 kg (195 lb 1.7 oz)     General Appearance:      HEENT:  Normocephalic, without obvious abnormality, atraumaticConjunctiva/corneas clear,.  Normal external ear canals, Nares normal, no drainage  Neck:  Supple, symmetrical, trachea midline. No JVD.  Lungs /Chest wall:   Bilateral basal rhonchi, respirations unlabored symmetrical wall movement.     Heart:  Regular rate and rhythm, systolic murmur PMI left sternal border  Abdomen: Soft, non-tender, no masses, no organomegaly.    Extremities: Trace edema no clubbing or Cyanosis        Data Review:  All labs (24hrs):   Recent Results (from the past 24 hour(s))   POC Glucose Once    Collection Time: 07/05/21 11:57 PM    Specimen: Blood   Result Value Ref Range    Glucose 116 (H) 70 - 105 mg/dL   Basic Metabolic Panel    Collection Time: 07/06/21  8:33 AM    Specimen: Blood   Result Value Ref Range    Glucose 114 (H) 65 - 99 mg/dL    BUN 30 (H) 8 - 23 mg/dL    Creatinine 1.34 (H) 0.57 - 1.00 mg/dL    Sodium 141 136 - 145 mmol/L    Potassium 4.2 3.5 - 5.2 mmol/L    Chloride 108 (H) 98 - 107 mmol/L    CO2 20.0 (L) 22.0 - 29.0 mmol/L    Calcium 9.2 8.6 - 10.5 mg/dL    eGFR Non African Amer 38 (L) >60 mL/min/1.73    BUN/Creatinine Ratio 22.4 7.0 - 25.0    Anion Gap 13.0 5.0 - 15.0 mmol/L   C-reactive Protein    Collection Time: 07/06/21  8:33 AM    Specimen: Blood   Result Value Ref Range    C-Reactive Protein 21.13 (H) 0.00 - 0.50 mg/dL   Procalcitonin    Collection Time: 07/06/21  8:33 AM    Specimen: Blood   Result Value Ref Range    Procalcitonin 0.43 (H) 0.00 - 0.25 ng/mL   Phosphorus    Collection Time: 07/06/21  8:33 AM    Specimen: Blood   Result Value Ref Range    Phosphorus 5.4 (H) 2.5 - 4.5 mg/dL   CBC Auto Differential    Collection Time: 07/06/21  8:48 AM    Specimen: Blood   Result Value Ref Range    WBC 10.30 3.40 - 10.80 10*3/mm3    RBC 3.46 (L) 3.77 - 5.28  10*6/mm3    Hemoglobin 9.6 (L) 12.0 - 15.9 g/dL    Hematocrit 30.6 (L) 34.0 - 46.6 %    MCV 88.3 79.0 - 97.0 fL    MCH 27.7 26.6 - 33.0 pg    MCHC 31.4 (L) 31.5 - 35.7 g/dL    RDW 15.7 (H) 12.3 - 15.4 %    RDW-SD 48.1 37.0 - 54.0 fl    MPV 9.7 6.0 - 12.0 fL    Platelets 218 140 - 450 10*3/mm3   Scan Slide    Collection Time: 07/06/21  8:48 AM    Specimen: Blood   Result Value Ref Range    Scan Slide     Manual Differential    Collection Time: 07/06/21  8:48 AM    Specimen: Blood   Result Value Ref Range    Neutrophil % 29.0 (L) 42.7 - 76.0 %    Lymphocyte % 62.0 (H) 19.6 - 45.3 %    Monocyte % 2.0 (L) 5.0 - 12.0 %    Eosinophil % 1.0 0.3 - 6.2 %    Bands %  6.0 (H) 0.0 - 5.0 %    Neutrophils Absolute 3.61 1.70 - 7.00 10*3/mm3    Lymphocytes Absolute 6.39 (H) 0.70 - 3.10 10*3/mm3    Monocytes Absolute 0.21 0.10 - 0.90 10*3/mm3    Eosinophils Absolute 0.10 0.00 - 0.40 10*3/mm3    Crenated RBC's Slight/1+ None Seen    Smudge Cells Slight/1+ None Seen    Clumped Platelets Present None Seen        Imaging:  [unfilled]    ASSESSMENT:    Pneumonia of left lung due to infectious organism  COPD exacerebration  Obstructive sleep apnea acute UTI (urinary tract infection)    Leukocytosis    CKD (chronic kidney disease) stage 3, GFR 30-59 ml/min (CMS/[facility])       PLAN:  Antibiotics  Follow-up with a chest x-ray if no improvement might consider bronchoscopy  Bronchodilator  Inhaled corticosteroids  Electrolytes/ glycemic control  DVT and GI prophylaxis.  Total Critical care time in direct medical management (   ) minutes       Wily Rutherford MD. D, ABSM.  7/6/2021  12:23 EDT

## 2021-07-06 NOTE — PLAN OF CARE
Goal Outcome Evaluation:      Pt declined PT today.  Pt reported she was just not feeling well today and was not up to amb.  Pt's daughter reported pt has not slept very much since admission.  Pt's daughter also reported that pt will be returning home and not to rehab and that she will be assisting pt and her  at home.

## 2021-07-06 NOTE — PLAN OF CARE
Goal Outcome Evaluation:  Plan of Care Reviewed With: patient, spouse, daughter        Progress: no change  Outcome Summary: Patient has been resting well in bed throughout the shift without any complaints. On 2L of oxygen statting at 90%; Dr. Enrique notified states it's patient's baseline. Pulmonology has been consulted for possible aspiration PNA. Remains on NS at 50mL/hr with IV antibiotics. Will continue to monitor.

## 2021-07-06 NOTE — PROGRESS NOTES
"      [facility] Medicine Services Daily Progress Note      Hospitalist Team  LOS 3 days      Patient Care Team:  Jemima Fontaine MD as PCP - General (Family Medicine)    Patient Location: 242/1      Subjective   Subjective     Chief Complaint / Subjective  Chief Complaint   Patient presents with   • Fall     pt fell at home, c/o generalized pain, c/o increased SOA as well         Brief Synopsis of Hospital Course/HPI    The patient is an 81 years old female with history of anxiety, depression, hyperlipidemia, hypertension, RLS, breast cancer s/p right lumpectomy, CKD stage III, 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    Apparently the patient had fallen at home while ambulating with her walker without associated loss of consciousness.  She claimed that her lower extremities gave out because of weakness.      Chest x-ray in the ED suggested right lower lobe pneumonia. SIRS was met with fever of 100.9F and leukocytosis 28.6.      Date::     7/5/21:  at the bedside.  Patient falling a lot at home.  Uses a walker.  Laceration of nose and right forearm.  Has been oxygen since right lumpectomy.  No history of chemo or radiation.  On Zosyn. S/p CT chest w/o showed left-sided pneumonia.  7/6/21: Poor historian.  Claims to be fatigued.  Consulted pulmonary      Review of Systems   All other systems reviewed and are negative.        Objective   Objective      Vital Signs  Temp:  [97.3 °F (36.3 °C)-97.8 °F (36.6 °C)] 97.8 °F (36.6 °C)  Heart Rate:  [72-82] 82  Resp:  [18-24] 24  BP: (133-151)/(63-86) 133/86  Oxygen Therapy  SpO2: 91 %  Pulse Oximetry Type: Continuous  Device (Oxygen Therapy): high-flow nasal cannula, humidified  Flow (L/min): 8  Oxygen Concentration (%): 36  Flowsheet Rows      First Filed Value   Admission Height  157.5 cm (62\") Documented at 07/02/2021 1617   Admission Weight  81.6 kg (180 lb) Documented at 07/02/2021 1617        Intake & Output (last 3 days)       07/03 0701 " - 07/04 0700 07/04 0701 - 07/05 0700 07/05 0701 - 07/06 0700 07/06 0701 - 07/07 0700    P.O. 840  220     IV Piggyback  100 100     Total Intake(mL/kg) 840 (9.8) 100 (1.2) 320 (3.6)     Urine (mL/kg/hr) 700 (0.3)       Emesis/NG output 0       Stool 0       Total Output 700       Net +140 +100 +320             Urine Unmeasured Occurrence  1 x 2 x     Stool Unmeasured Occurrence 2 x 1 x 4 x     Emesis Unmeasured Occurrence 2 x           Lines, Drains & Airways    Active LDAs     Name:   Placement date:   Placement time:   Site:   Days:    Peripheral IV 07/05/21 1120 Anterior;Left Forearm   07/05/21    1120    Forearm   less than 1                  Physical Exam:    Physical Exam  HENT:      Head: Normocephalic.   Eyes:      Extraocular Movements: Extraocular movements intact.      Pupils: Pupils are equal, round, and reactive to light.   Cardiovascular:      Rate and Rhythm: Normal rate and regular rhythm.   Pulmonary:      Effort: Pulmonary effort is normal.   Abdominal:      General: Bowel sounds are normal.      Palpations: Abdomen is soft.   Musculoskeletal:         General: Normal range of motion.      Cervical back: Normal range of motion.   Skin:     General: Skin is warm.   Neurological:      Mental Status: She is alert and oriented to person, place, and time. Mental status is at baseline.   Psychiatric:         Mood and Affect: Mood normal.         Procedures:      Results Review:     I reviewed the patient's new clinical results.      Lab Results (last 24 hours)     Procedure Component Value Units Date/Time    Respiratory Culture - Sputum, Cough [[id number]] Collected: 07/03/21 1308    Specimen: Sputum from Cough Updated: 07/06/21 0948     Respiratory Culture Moderate growth (3+) Normal Respiratory Leanna: NO S.aureus/MRSA or Pseudomonas aeruginosa     Gram Stain Many (4+) WBCs per low power field      Many (4+) Epithelial cells per low power field      Moderate (3+) Mixed bacterial morphotypes seen on Gram  Stain    Manual Differential [[id number]]  (Abnormal) Collected: 07/06/21 0848    Specimen: Blood Updated: 07/06/21 0940     Neutrophil % 29.0 %      Lymphocyte % 62.0 %      Monocyte % 2.0 %      Eosinophil % 1.0 %      Bands %  6.0 %      Neutrophils Absolute 3.61 10*3/mm3      Lymphocytes Absolute 6.39 10*3/mm3      Monocytes Absolute 0.21 10*3/mm3      Eosinophils Absolute 0.10 10*3/mm3      Crenated RBC's Slight/1+     Smudge Cells Slight/1+     Clumped Platelets Present    Narrative:      Reviewed by Pathologist within the past 30 days on [id number] .      CBC & Differential [[id number]]  (Abnormal) Collected: 07/06/21 0848    Specimen: Blood Updated: 07/06/21 0940    Narrative:      The following orders were created for panel order CBC & Differential.  Procedure                               Abnormality         Status                     ---------                               -----------         ------                     Scan Slide[[id number]]                                       Final result               CBC Auto Differential[[id number]]        Abnormal            Final result                 Please view results for these tests on the individual orders.    Scan Slide [[id number]] Collected: 07/06/21 0848    Specimen: Blood Updated: 07/06/21 0940     Scan Slide --     Comment: See Manual Differential Results       CBC Auto Differential [[id number]]  (Abnormal) Collected: 07/06/21 0848    Specimen: Blood Updated: 07/06/21 0940     WBC 10.30 10*3/mm3      RBC 3.46 10*6/mm3      Hemoglobin 9.6 g/dL      Hematocrit 30.6 %      MCV 88.3 fL      MCH 27.7 pg      MCHC 31.4 g/dL      RDW 15.7 %      RDW-SD 48.1 fl      MPV 9.7 fL      Platelets 218 10*3/mm3      Comment: Platelet estimate performed due to platelet clumping.        C-reactive Protein [[id number]]  (Abnormal) Collected: 07/06/21 0833    Specimen: Blood Updated: 07/06/21 0926     C-Reactive Protein 21.13 mg/dL     Extra Tubes [[id number]] Collected: 07/06/21  "0848    Specimen: Blood, Venous Line Updated: 07/06/21 0914    Narrative:      The following orders were created for panel order Extra Tubes.  Procedure                               Abnormality         Status                     ---------                               -----------         ------                     Green Top (Gel)[[id number]]                                  Final result                 Please view results for these tests on the individual orders.    Green Top (Gel) [[id number]] Collected: 07/06/21 0848    Specimen: Blood Updated: 07/06/21 0914    Procalcitonin [[id number]]  (Abnormal) Collected: 07/06/21 0833    Specimen: Blood Updated: 07/06/21 0913     Procalcitonin 0.43 ng/mL     Narrative:      As a Marker for Sepsis (Non-Neonates):     1. <0.5 ng/mL represents a low risk of severe sepsis and/or septic shock.  2. >2 ng/mL represents a high risk of severe sepsis and/or septic shock.    As a Marker for Lower Respiratory Tract Infections that require antibiotic therapy:  PCT on Admission     Antibiotic Therapy             6-12 Hrs later  >0.5                          Strongly Recommended            >0.25 - <0.5             Recommended  0.1 - 0.25                  Discouraged                       Remeasure/reassess PCT  <0.1                         Strongly Discouraged         Remeasure/reassess PCT      As 28 day mortality risk marker: \"Change in Procalcitonin Result\" (>80% or <=80%) if Day 0 (or Day 1) and Day 4 values are available. Refer to http://www.[employer]-pct-calculator.com/    Change in PCT <=80 %   A decrease of PCT levels below or equal to 80% defines a positive change in PCT test result representing a higher risk for 28-day all-cause mortality of patients diagnosed with severe sepsis or septic shock.    Change in PCT >80 %   A decrease of PCT levels of more than 80% defines a negative change in PCT result representing a lower risk for 28-day all-cause mortality of patients diagnosed with " severe sepsis or septic shock.              Results may be falsely decreased if patient taking Biotin.     Basic Metabolic Panel [[id number]]  (Abnormal) Collected: 07/06/21 0833    Specimen: Blood Updated: 07/06/21 0908     Glucose 114 mg/dL      BUN 30 mg/dL      Creatinine 1.34 mg/dL      Sodium 141 mmol/L      Potassium 4.2 mmol/L      Chloride 108 mmol/L      CO2 20.0 mmol/L      Calcium 9.2 mg/dL      eGFR Non African Amer 38 mL/min/1.73      BUN/Creatinine Ratio 22.4     Anion Gap 13.0 mmol/L     Narrative:      GFR Normal >60  Chronic Kidney Disease <60  Kidney Failure <15      Phosphorus [[id number]]  (Abnormal) Collected: 07/06/21 0833    Specimen: Blood Updated: 07/06/21 0908     Phosphorus 5.4 mg/dL     Pathology Consultation [[id number]] Collected: 07/02/21 1828    Specimen: Blood, Venous Line Updated: 07/06/21 0821     Final Diagnosis --     Absolute atypical lymphocytosis suspicious for CLL/SLL       Case Report --     Surgical Pathology Report                         Case: [id number]                                  Authorizing Provider:  Karie Pardo APRN     Collected:           07/02/2021 06:28 PM          Ordering Location:     [facility]       Received:            07/06/2021 07:32 AM                                 EMERGENCY DEPARTMENT                                                         Pathologist:           Frederick Celeste MD                                                            Specimen:    Blood, Venous Line                                                                         POC Glucose Once [[id number]]  (Abnormal) Collected: 07/05/21 2357    Specimen: Blood Updated: 07/05/21 2358     Glucose 116 mg/dL      Comment: Serial Number: [id number]  [id number]       Blood Culture - Blood, Leg, Left [[id number]] Collected: 07/02/21 1938    Specimen: Blood from Leg, Left Updated: 07/05/21 1945     Blood Culture No growth at 3 days        Hemoglobin A1C   Date Value Ref  Range Status   07/04/2021 6.6 (H) 3.5 - 5.6 % Final               Lab Results   Component Value Date    LIPASE 12 (L) 07/02/2021     Lab Results   Component Value Date    CHOL 374 (H) 01/03/2020    TRIG 311 (H) 01/03/2020    HDL 50 01/03/2020     (H) 01/03/2020       Lab Results   Lab Value Date/Time    FINALDX  07/02/2021 1828     Absolute atypical lymphocytosis suspicious for CLL/SLL      FINALDX  12/31/2020 0205     Leukocytosis with absolute atypical lymphocytosis  Anemia  No blasts identified  If CBC indices persist/worsen, consider flow cytometry or other studies as clinically indicated to exclude a lymphoproliferative disorder         Microbiology Results (last 10 days)     Procedure Component Value - Date/Time    S. Pneumo Ag Urine or CSF - Urine, Urine, Clean Catch [[id number]]  (Normal) Collected: 07/03/21 1312    Lab Status: Final result Specimen: Urine, Clean Catch Updated: 07/03/21 1441     Strep Pneumo Ag Negative    Legionella Antigen, Urine - Urine, Urine, Clean Catch [[id number]]  (Normal) Collected: 07/03/21 1312    Lab Status: Final result Specimen: Urine, Clean Catch Updated: 07/03/21 1441     LEGIONELLA ANTIGEN, URINE Negative    Respiratory Culture - Sputum, Cough [[id number]] Collected: 07/03/21 1308    Lab Status: Final result Specimen: Sputum from Cough Updated: 07/06/21 0948     Respiratory Culture Moderate growth (3+) Normal Respiratory Leanna: NO S.aureus/MRSA or Pseudomonas aeruginosa     Gram Stain Many (4+) WBCs per low power field      Many (4+) Epithelial cells per low power field      Moderate (3+) Mixed bacterial morphotypes seen on Gram Stain    MRSA Screen, PCR (Inpatient) - Swab, Nares [[id number]]  (Normal) Collected: 07/03/21 1143    Lab Status: Final result Specimen: Swab from Nares Updated: 07/03/21 1542     MRSA PCR No MRSA Detected    Respiratory Panel PCR w/COVID-19(SARS-CoV-2) LESLEE/VIVIANE/PAVAN/PAD/COR/MAD/JUAN DAVID In-House, NP Swab in UTM/VTM, 3-4 HR TAT - Swab, Nasopharynx  [[id number]]  (Normal) Collected: 07/02/21 2216    Lab Status: Final result Specimen: Swab from Nasopharynx Updated: 07/02/21 2309     ADENOVIRUS, PCR Not Detected     Coronavirus 229E Not Detected     Coronavirus HKU1 Not Detected     Coronavirus NL63 Not Detected     Coronavirus OC43 Not Detected     COVID19 Not Detected     Human Metapneumovirus Not Detected     Human Rhinovirus/Enterovirus Not Detected     Influenza A PCR Not Detected     Influenza B PCR Not Detected     Parainfluenza Virus 1 Not Detected     Parainfluenza Virus 2 Not Detected     Parainfluenza Virus 3 Not Detected     Parainfluenza Virus 4 Not Detected     RSV, PCR Not Detected     Bordetella pertussis pcr Not Detected     Bordetella parapertussis PCR Not Detected     Chlamydophila pneumoniae PCR Not Detected     Mycoplasma pneumo by PCR Not Detected    Narrative:      In the setting of a positive respiratory panel with a viral infection PLUS a negative procalcitonin without other underlying concern for bacterial infection, consider observing off antibiotics or discontinuation of antibiotics and continue supportive care. If the respiratory panel is positive for atypical bacterial infection (Bordetella pertussis, Chlamydophila pneumoniae, or Mycoplasma pneumoniae), consider antibiotic de-escalation to target atypical bacterial infection.    Blood Culture - Blood, Leg, Left [[id number]] Collected: 07/02/21 1938    Lab Status: Preliminary result Specimen: Blood from Leg, Left Updated: 07/05/21 1945     Blood Culture No growth at 3 days    Urine Culture - Urine, Urine, Catheter [[id number]]  (Abnormal)  (Susceptibility) Collected: 07/02/21 1921    Lab Status: Final result Specimen: Urine, Catheter Updated: 07/04/21 0132     Urine Culture >100,000 CFU/mL Escherichia coli    Susceptibility      Escherichia coli      JUAN      Ampicillin Susceptible      Ampicillin + Sulbactam Susceptible      Cefazolin Susceptible      Cefepime Susceptible       Ceftazidime Susceptible      Ceftriaxone Susceptible      Gentamicin Susceptible      Levofloxacin Susceptible      Nitrofurantoin Susceptible      Piperacillin + Tazobactam Susceptible      Tetracycline Susceptible      Trimethoprim + Sulfamethoxazole Susceptible               Linear View                   Blood Culture - Blood, Arm, Left [[id number]]  (Abnormal) Collected: 07/02/21 1828    Lab Status: Final result Specimen: Blood from Arm, Left Updated: 07/05/21 1135     Blood Culture Staphylococcus, coagulase negative     Comment: Probable contaminant requires clinical correlation, susceptibility not performed unless requested by physician.          Isolated from Aerobic Bottle     Gram Stain Aerobic Bottle Gram positive cocci in clusters    Blood Culture ID, PCR - Blood, Arm, Left [[id number]]  (Abnormal) Collected: 07/02/21 1828    Lab Status: Final result Specimen: Blood from Arm, Left Updated: 07/03/21 1855     BCID, PCR Staphylococcus spp, not aureus. Identification by BCID PCR.     BOTTLE TYPE Aerobic Bottle          ECG/EMG Results (most recent)     Procedure Component Value Units Date/Time    ECG 12 Lead [[id number]] Collected: 07/02/21 1637     Updated: 07/04/21 0947     QT Interval 322 ms     Narrative:      HEART RATE= 104  bpm  RR Interval= 580  ms  LA Interval= 123  ms  P Horizontal Axis= -27  deg  P Front Axis= 17  deg  QRSD Interval= 89  ms  QT Interval= 322  ms  QRS Axis= 48  deg  T Wave Axis= 63  deg  - OTHERWISE NORMAL ECG -  Sinus tachycardia  Low voltage, precordial leads  When compared with ECG of 30-Dec-2020 13:14:52,  Significant change in rhythm  Electronically Signed By: Holden Ortiz (Simeon) 04-Jul-2021 09:33:16  Date and Time of Study: 2021-07-02 16:37:22          Results for orders placed during the hospital encounter of 12/30/20    Duplex Venous Lower Extremity - Bilateral CAR    Interpretation Summary  · Normal bilateral lower extremity venous duplex scan.      Results for orders placed  during the hospital encounter of 12/30/20    Adult Transthoracic Echo Complete W/ Cont if Necessary Per Protocol    Interpretation Summary  · Estimated left ventricular EF = 65% Estimated left ventricular EF was in agreement with the calculated left ventricular EF. Left ventricular systolic function is normal.  · There is mainly affecting the non-coronary and left coronary cusp(s).  · The right ventricular cavity is mildly dilated.  · Estimated right ventricular systolic pressure from tricuspid regurgitation is normal (<35 mmHg).      CT Chest Without Contrast Diagnostic    Result Date: 7/5/2021  1. Left lower lobe consolidation likely representing pneumonia. 2. Retained ingested material within the distal esophagus with small hiatal hernia. This may indicate stasis or reflux.    Electronically Signed By-Antony Lopez MD On:7/5/2021 3:16 PM This report was finalized on [id number] by  Antony Lopez MD.    XR Chest 1 View    Result Date: 7/2/2021  Increased left lower lung airspace opacities, which may be due to atelectasis and/or pneumonia.  Electronically Signed By-Jacquie Arroyo MD On:7/2/2021 5:58 PM This report was finalized on [id number] by  Jacquie Arroyo MD.          Xrays, labs reviewed personally by physician.    Medication Review:   I have reviewed the patient's current medication list      Scheduled Meds  amLODIPine, 10 mg, Oral, Daily  anastrozole, 1 mg, Oral, Daily  arformoterol, 15 mcg, Nebulization, BID - RT  budesonide, 0.5 mg, Nebulization, BID - RT  cetirizine, 10 mg, Oral, Nightly  chlorthalidone, 25 mg, Oral, Daily  docusate sodium, 100 mg, Oral, BID  enoxaparin, 40 mg, Subcutaneous, Daily  ferrous sulfate, 324 mg, Oral, Once per day on Mon Wed Fri  guaiFENesin, 1,200 mg, Oral, Q12H  ipratropium-albuterol, 3 mL, Nebulization, Q4H - RT  levothyroxine, 100 mcg, Oral, Q AM  losartan, 100 mg, Oral, Daily  mirtazapine, 15 mg, Oral, Nightly  pantoprazole, 40 mg, Oral, QAM  AC  piperacillin-tazobactam, 3.375 g, Intravenous, Q8H  rOPINIRole, 0.25 mg, Oral, Nightly  rosuvastatin, 10 mg, Oral, Nightly  sertraline, 150 mg, Oral, Nightly  sodium chloride, 3 mL, Intravenous, Q12H  vitamin D3, 5,000 Units, Oral, Daily        Meds Infusions  sodium chloride, 50 mL/hr, Last Rate: 50 mL/hr (07/05/21 2117)        Meds PRN  •  acetaminophen **OR** acetaminophen **OR** acetaminophen  •  benzonatate  •  ipratropium-albuterol  •  melatonin  •  metoclopramide  •  nitroglycerin  •  ondansetron **OR** ondansetron  •  oxyCODONE  •  [COMPLETED] Insert peripheral IV **AND** sodium chloride  •  sodium chloride        Assessment/Plan   Assessment/Plan     Active Hospital Problems    Diagnosis  POA   • Acute UTI (urinary tract infection) [N39.0]  Yes   • Leukocytosis [D72.829]  Yes   • CKD (chronic kidney disease) stage 3, GFR 30-59 ml/min (CMS/HCC) [N18.30]  Yes   • Pneumonia of left lung due to infectious organism [J18.9]  Yes      Resolved Hospital Problems   No resolved problems to display.       MEDICAL DECISION MAKING COMPLEXITY BY PROBLEM:     Sepsis secondary to LLL pneumonia:  -Rocephin and doxycycline discontinued  -Continue Zosyn  -CT chest 7/5/2021--> pneumonia  -Consulted pulmonology 7/6/2021    Acute on chronic hypoxemic respiratory failure due to pneumonia:  -Titrate pulse ox> 92%    Acute UTI d/t E coli - POA  - on antibiotics as above      Generalized weakness with falls:  -Likely secondary to acute illness/infection  -consulted PT/OT--> recommended inpatient rehab      Acute Kidney Injury on Chronic kidney disease stage III:  - baseline Cr 1-1.3  - continue IV fluids      Hypocalcemia: Repleted  - replace IV 7/3/21     Obesity  -  on lifestyle changes as appropriate      Chronic HFpEF:  - stable, not in exacerbation  -TTE 1/6/2021 --> LVEF 65%      Hypothyroidism  - on home levothyroxine     Anxiety/depression: Controlled  - on home Remeron, Zoloft     Hyperlipidemia:  -Continue  statin        New onset diabetes Mellitus type 2:  - Hgb A1c 6.6  - add consistent carb diet  - consult diabetes educator      Normocytic anemia:  - hemoglobin 10.5      Essential hypertension  - controlled on home Norvasc, chlorthalidone, losartan     GERD  - on PPI and Reglan     Restless leg syndrome  - on ropinirole     History breast cancer s/p right lumpectomy:  - on anastrozole     allergic rhinitis: Controlled  - on cetirizine     Chronic constipation:  -Continue stool softeners        VTE Prophylaxis -   Mechanical Order History:     None      Pharmalogical Order History:      Ordered     Dose Route Frequency Stop    07/02/21 2010  enoxaparin (LOVENOX) syringe 40 mg      40 mg SC Daily --                  Code Status -   Code Status and Medical Interventions:   Ordered at: 07/02/21 2010     Code Status:    CPR     Medical Interventions (Level of Support Prior to Arrest):    Full       This patient has been examined wearing appropriate Personal Protective Equipment and discussed with nursing. 07/06/21        Discharge Planning  defer        Electronically signed by Martinez Hitchcock DO, 07/06/21, 13:27 EDT.  [facility] Hospitalist Team

## 2021-07-06 NOTE — CASE MANAGEMENT/SOCIAL WORK
Continued Stay Note   [facility]     Patient Name: Dinae Guan  MRN: [id number]  Today's Date: 7/6/2021    Admit Date: 7/2/2021    Discharge Plan     Row Name 07/06/21 1220       Plan    Plan  Declined IP rehab. From home with spouse. Provided HH list, will follow up for decision. Has continuous home O2 through [employer].    Provided Post Acute Provider List?  Yes    Post Acute Provider List  Home Health    Delivered To  Patient    Method of Delivery  In person    Patient/Family in Agreement with Plan  yes    Plan Comments  Met with patient and spouse at bedside to discuss IP rehab again, patient continues to decline. Discussed home health and she is agreeable, provided list and will follow up for decision. Pt/spouse wanted to speak to daughter prior to making a decision. Patient reports she normally wears home O2 at 2 L NC through [employer]. DC barriers: 6 L HF NC, IV abx, pulmonary consult        Met with patient in room wearing PPE: mask    Maintained distance greater than six feet and spent less than 15 minutes in the room.            Emily Carrasco RN

## 2021-07-07 ENCOUNTER — APPOINTMENT (OUTPATIENT)
Dept: GENERAL RADIOLOGY | Facility: HOSPITAL | Age: 81
End: 2021-07-07

## 2021-07-07 LAB
ANION GAP SERPL CALCULATED.3IONS-SCNC: 11 MMOL/L (ref 5–15)
ANISOCYTOSIS BLD QL: ABNORMAL
BACTERIA SPEC AEROBE CULT: NORMAL
BUN SERPL-MCNC: 25 MG/DL (ref 8–23)
BUN/CREAT SERPL: 21 (ref 7–25)
CALCIUM SPEC-SCNC: 8.5 MG/DL (ref 8.6–10.5)
CHLORIDE SERPL-SCNC: 108 MMOL/L (ref 98–107)
CO2 SERPL-SCNC: 21 MMOL/L (ref 22–29)
CREAT SERPL-MCNC: 1.19 MG/DL (ref 0.57–1)
CRP SERPL-MCNC: 12.39 MG/DL (ref 0–0.5)
DEPRECATED RDW RBC AUTO: 50.3 FL (ref 37–54)
ERYTHROCYTE [DISTWIDTH] IN BLOOD BY AUTOMATED COUNT: 16.1 % (ref 12.3–15.4)
GFR SERPL CREATININE-BSD FRML MDRD: 44 ML/MIN/1.73
GLUCOSE SERPL-MCNC: 113 MG/DL (ref 65–99)
HCT VFR BLD AUTO: 27.1 % (ref 34–46.6)
HGB BLD-MCNC: 8.5 G/DL (ref 12–15.9)
LYMPHOCYTES # BLD MANUAL: 9.92 10*3/MM3 (ref 0.7–3.1)
LYMPHOCYTES NFR BLD MANUAL: 3 % (ref 5–12)
LYMPHOCYTES NFR BLD MANUAL: 64 % (ref 19.6–45.3)
MCH RBC QN AUTO: 27.5 PG (ref 26.6–33)
MCHC RBC AUTO-ENTMCNC: 31.5 G/DL (ref 31.5–35.7)
MCV RBC AUTO: 87.5 FL (ref 79–97)
MONOCYTES # BLD AUTO: 0.47 10*3/MM3 (ref 0.1–0.9)
NEUTROPHILS # BLD AUTO: 5.12 10*3/MM3 (ref 1.7–7)
NEUTROPHILS NFR BLD MANUAL: 33 % (ref 42.7–76)
PHOSPHATE SERPL-MCNC: 5 MG/DL (ref 2.5–4.5)
PLAT MORPH BLD: NORMAL
PLATELET # BLD AUTO: 237 10*3/MM3 (ref 140–450)
PMV BLD AUTO: 9 FL (ref 6–12)
POIKILOCYTOSIS BLD QL SMEAR: ABNORMAL
POTASSIUM SERPL-SCNC: 4.4 MMOL/L (ref 3.5–5.2)
RBC # BLD AUTO: 3.1 10*6/MM3 (ref 3.77–5.28)
SCAN SLIDE: NORMAL
SMUDGE CELLS BLD QL SMEAR: ABNORMAL
SODIUM SERPL-SCNC: 140 MMOL/L (ref 136–145)
WBC # BLD AUTO: 15.5 10*3/MM3 (ref 3.4–10.8)

## 2021-07-07 PROCEDURE — 25010000002 ENOXAPARIN PER 10 MG: Performed by: NURSE PRACTITIONER

## 2021-07-07 PROCEDURE — 85007 BL SMEAR W/DIFF WBC COUNT: CPT | Performed by: HOSPITALIST

## 2021-07-07 PROCEDURE — 94799 UNLISTED PULMONARY SVC/PX: CPT

## 2021-07-07 PROCEDURE — 80048 BASIC METABOLIC PNL TOTAL CA: CPT | Performed by: HOSPITALIST

## 2021-07-07 PROCEDURE — 71045 X-RAY EXAM CHEST 1 VIEW: CPT

## 2021-07-07 PROCEDURE — 99232 SBSQ HOSP IP/OBS MODERATE 35: CPT | Performed by: HOSPITALIST

## 2021-07-07 PROCEDURE — 84100 ASSAY OF PHOSPHORUS: CPT | Performed by: HOSPITALIST

## 2021-07-07 PROCEDURE — 97530 THERAPEUTIC ACTIVITIES: CPT

## 2021-07-07 PROCEDURE — 25010000002 PIPERACILLIN SOD-TAZOBACTAM PER 1 G: Performed by: INTERNAL MEDICINE

## 2021-07-07 PROCEDURE — 86140 C-REACTIVE PROTEIN: CPT | Performed by: HOSPITALIST

## 2021-07-07 PROCEDURE — 85025 COMPLETE CBC W/AUTO DIFF WBC: CPT | Performed by: HOSPITALIST

## 2021-07-07 RX ORDER — BUDESONIDE 0.5 MG/2ML
0.5 INHALANT ORAL
Status: DISCONTINUED | OUTPATIENT
Start: 2021-07-07 | End: 2021-07-08 | Stop reason: HOSPADM

## 2021-07-07 RX ORDER — IPRATROPIUM BROMIDE AND ALBUTEROL SULFATE 2.5; .5 MG/3ML; MG/3ML
3 SOLUTION RESPIRATORY (INHALATION)
Status: DISCONTINUED | OUTPATIENT
Start: 2021-07-07 | End: 2021-07-08

## 2021-07-07 RX ADMIN — FERROUS SULFATE TAB EC 324 MG (65 MG FE EQUIVALENT) 324 MG: 324 (65 FE) TABLET DELAYED RESPONSE at 08:18

## 2021-07-07 RX ADMIN — PIPERACILLIN AND TAZOBACTAM 3.38 G: 3; .375 INJECTION, POWDER, LYOPHILIZED, FOR SOLUTION INTRAVENOUS at 15:46

## 2021-07-07 RX ADMIN — CETIRIZINE HYDROCHLORIDE 10 MG: 10 TABLET, FILM COATED ORAL at 20:19

## 2021-07-07 RX ADMIN — SERTRALINE HYDROCHLORIDE 150 MG: 100 TABLET ORAL at 20:19

## 2021-07-07 RX ADMIN — PIPERACILLIN AND TAZOBACTAM 3.38 G: 3; .375 INJECTION, POWDER, LYOPHILIZED, FOR SOLUTION INTRAVENOUS at 08:18

## 2021-07-07 RX ADMIN — PIPERACILLIN AND TAZOBACTAM 3.38 G: 3; .375 INJECTION, POWDER, LYOPHILIZED, FOR SOLUTION INTRAVENOUS at 00:38

## 2021-07-07 RX ADMIN — AMLODIPINE BESYLATE 10 MG: 5 TABLET ORAL at 08:17

## 2021-07-07 RX ADMIN — OXYCODONE 5 MG: 5 TABLET ORAL at 20:29

## 2021-07-07 RX ADMIN — IPRATROPIUM BROMIDE AND ALBUTEROL SULFATE 3 ML: 2.5; .5 SOLUTION RESPIRATORY (INHALATION) at 15:02

## 2021-07-07 RX ADMIN — ANASTROZOLE 1 MG: 1 TABLET, COATED ORAL at 08:15

## 2021-07-07 RX ADMIN — Medication 3 ML: at 20:20

## 2021-07-07 RX ADMIN — MIRTAZAPINE 15 MG: 15 TABLET, FILM COATED ORAL at 20:19

## 2021-07-07 RX ADMIN — LOSARTAN POTASSIUM 100 MG: 50 TABLET, FILM COATED ORAL at 08:16

## 2021-07-07 RX ADMIN — Medication 3 ML: at 08:21

## 2021-07-07 RX ADMIN — GUAIFENESIN 1200 MG: 600 TABLET, EXTENDED RELEASE ORAL at 20:19

## 2021-07-07 RX ADMIN — BUDESONIDE 0.5 MG: 0.5 SUSPENSION RESPIRATORY (INHALATION) at 18:45

## 2021-07-07 RX ADMIN — BENZONATATE 100 MG: 100 CAPSULE ORAL at 20:29

## 2021-07-07 RX ADMIN — ENOXAPARIN SODIUM 40 MG: 40 INJECTION SUBCUTANEOUS at 15:44

## 2021-07-07 RX ADMIN — IPRATROPIUM BROMIDE AND ALBUTEROL SULFATE 3 ML: 2.5; .5 SOLUTION RESPIRATORY (INHALATION) at 03:31

## 2021-07-07 RX ADMIN — DOCUSATE SODIUM 100 MG: 100 CAPSULE, LIQUID FILLED ORAL at 08:16

## 2021-07-07 RX ADMIN — BUDESONIDE 0.5 MG: 0.5 SUSPENSION RESPIRATORY (INHALATION) at 06:44

## 2021-07-07 RX ADMIN — IPRATROPIUM BROMIDE AND ALBUTEROL SULFATE 3 ML: 2.5; .5 SOLUTION RESPIRATORY (INHALATION) at 23:34

## 2021-07-07 RX ADMIN — LEVOTHYROXINE SODIUM 100 MCG: 0.1 TABLET ORAL at 05:14

## 2021-07-07 RX ADMIN — ARFORMOTEROL TARTRATE 15 MCG: 15 SOLUTION RESPIRATORY (INHALATION) at 18:45

## 2021-07-07 RX ADMIN — Medication 5000 UNITS: at 08:16

## 2021-07-07 RX ADMIN — SODIUM CHLORIDE 50 ML/HR: 9 INJECTION, SOLUTION INTRAVENOUS at 02:11

## 2021-07-07 RX ADMIN — PANTOPRAZOLE SODIUM 40 MG: 40 TABLET, DELAYED RELEASE ORAL at 08:18

## 2021-07-07 RX ADMIN — Medication 5 MG: at 20:29

## 2021-07-07 RX ADMIN — ROPINIROLE HYDROCHLORIDE 0.25 MG: 0.25 TABLET, FILM COATED ORAL at 20:19

## 2021-07-07 RX ADMIN — ROSUVASTATIN CALCIUM 10 MG: 10 TABLET, FILM COATED ORAL at 20:19

## 2021-07-07 RX ADMIN — CHLORTHALIDONE 25 MG: 25 TABLET ORAL at 08:16

## 2021-07-07 RX ADMIN — IPRATROPIUM BROMIDE AND ALBUTEROL SULFATE 3 ML: 2.5; .5 SOLUTION RESPIRATORY (INHALATION) at 06:44

## 2021-07-07 RX ADMIN — DOCUSATE SODIUM 100 MG: 100 CAPSULE, LIQUID FILLED ORAL at 20:20

## 2021-07-07 RX ADMIN — GUAIFENESIN 1200 MG: 600 TABLET, EXTENDED RELEASE ORAL at 08:16

## 2021-07-07 NOTE — CASE MANAGEMENT/SOCIAL WORK
Continued Stay Note  KELLY Bolton     Patient Name: Diane Guan  MRN: [id number]  Today's Date: 7/7/2021    Admit Date: 7/2/2021    Discharge Plan     Row Name 07/07/21 1026       Plan    Plan  Declined IP rehab and home health. Anticipate routine home with spouse and daughter. Has continuous home O2 through [facility].    Plan Comments  Called and spoke to spouse about d/c plan, he deferred to his daughter Marianna. Spoke to daughter and she reports patient has declined IP rehab and home health. She does not want anyone coming into her home. Daughter has taken FMLA and will be staying with patient and spouse after d/c. DC barriers: 10 L HF NC, pulmonary following        Phone communication or documentation only - no physical contact with patient or family.        Emily Carrasco RN

## 2021-07-07 NOTE — PROGRESS NOTES
"      [facility] Medicine Services Daily Progress Note      Hospitalist Team  LOS 4 days      Patient Care Team:  Jemima Fontaine MD as PCP - General (Family Medicine)    Patient Location: 242/1      Subjective   Subjective     Chief Complaint / Subjective  Chief Complaint   Patient presents with   • Fall     pt fell at home, c/o generalized pain, c/o increased SOA as well         Brief Synopsis of Hospital Course/HPI    The patient is an 81 years old female with history of anxiety, depression, hyperlipidemia, hypertension, RLS, breast cancer s/p right lumpectomy, CKD stage III, 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    Apparently the patient had fallen at home while ambulating with her walker without associated loss of consciousness.  She claimed that her lower extremities gave out because of weakness.      Chest x-ray in the ED suggested right lower lobe pneumonia. SIRS was met with fever of 100.9F and leukocytosis 28.6.      Date::     7/5/21:  at the bedside.  Patient falling a lot at home.  Uses a walker.  Laceration of nose and right forearm.  Has been oxygen since right lumpectomy.  No history of chemo or radiation.  On Zosyn. S/p CT chest w/o showed left-sided pneumonia.  7/6/21: Poor historian.  Claims to be fatigued.  Consulted pulmonary  7/7/21: Complains of intermittent nausea.  Afebrile.      Review of Systems   All other systems reviewed and are negative.        Objective   Objective      Vital Signs  Temp:  [97.5 °F (36.4 °C)-98.5 °F (36.9 °C)] 97.5 °F (36.4 °C)  Heart Rate:  [68-95] 68  Resp:  [20-22] 20  BP: (148-186)/(65-76) 181/72  Oxygen Therapy  SpO2: 91 %  Pulse Oximetry Type: Intermittent  Device (Oxygen Therapy): humidified, high-flow nasal cannula  Flow (L/min): 10  Oxygen Concentration (%): 36  Flowsheet Rows      First Filed Value   Admission Height  157.5 cm (62\") Documented at 07/02/2021 1617   Admission Weight  81.6 kg (180 lb) Documented at 07/02/2021 " 1617        Intake & Output (last 3 days)       07/04 0701 - 07/05 0700 07/05 0701 - 07/06 0700 07/06 0701 - 07/07 0700 07/07 0701 - 07/08 0700    P.O.  220 840 120    I.V. (mL/kg)   1000 (11.3)     IV Piggyback 100 100 100     Total Intake(mL/kg) 100 (1.2) 320 (3.6) 1940 (21.9) 120 (1.4)    Urine (mL/kg/hr)    200 (0.2)    Emesis/NG output        Stool    0    Total Output    200    Net +100 +320 +1940 -80            Urine Unmeasured Occurrence 1 x 2 x 2 x     Stool Unmeasured Occurrence 1 x 4 x  1 x        Lines, Drains & Airways    Active LDAs     Name:   Placement date:   Placement time:   Site:   Days:    Peripheral IV 07/05/21 1120 Anterior;Left Forearm   07/05/21    1120    Forearm   less than 1                  Physical Exam:    Physical Exam  HENT:      Head: Normocephalic.   Eyes:      Extraocular Movements: Extraocular movements intact.      Pupils: Pupils are equal, round, and reactive to light.   Cardiovascular:      Rate and Rhythm: Normal rate and regular rhythm.   Pulmonary:      Effort: Pulmonary effort is normal.   Abdominal:      General: Bowel sounds are normal.      Palpations: Abdomen is soft.   Musculoskeletal:         General: Normal range of motion.      Cervical back: Normal range of motion.   Skin:     General: Skin is warm.   Neurological:      Mental Status: She is alert and oriented to person, place, and time. Mental status is at baseline.   Psychiatric:         Mood and Affect: Mood normal.         Procedures:      Results Review:     I reviewed the patient's new clinical results.      Lab Results (last 24 hours)     Procedure Component Value Units Date/Time    Manual Differential [[id number]]  (Abnormal) Collected: 07/07/21 0323    Specimen: Blood Updated: 07/07/21 0620     Neutrophil % 33.0 %      Lymphocyte % 64.0 %      Monocyte % 3.0 %      Neutrophils Absolute 5.12 10*3/mm3      Lymphocytes Absolute 9.92 10*3/mm3      Monocytes Absolute 0.47 10*3/mm3      Anisocytosis Slight/1+      Poikilocytes Slight/1+     Smudge Cells Slight/1+     Platelet Morphology Normal    Narrative:      Reviewed by Pathologist within the past 30 days on [id number] .      CBC & Differential [[id number]]  (Abnormal) Collected: 07/07/21 0323    Specimen: Blood Updated: 07/07/21 0620    Narrative:      The following orders were created for panel order CBC & Differential.  Procedure                               Abnormality         Status                     ---------                               -----------         ------                     Scan Slide[[id number]]                                       Final result               CBC Auto Differential[[id number]]        Abnormal            Final result                 Please view results for these tests on the individual orders.    Scan Slide [[id number]] Collected: 07/07/21 0323    Specimen: Blood Updated: 07/07/21 0620     Scan Slide --     Comment: See Manual Differential Results       CBC Auto Differential [[id number]]  (Abnormal) Collected: 07/07/21 0323    Specimen: Blood Updated: 07/07/21 0620     WBC 15.50 10*3/mm3      RBC 3.10 10*6/mm3      Hemoglobin 8.5 g/dL      Hematocrit 27.1 %      MCV 87.5 fL      MCH 27.5 pg      MCHC 31.5 g/dL      RDW 16.1 %      RDW-SD 50.3 fl      MPV 9.0 fL      Platelets 237 10*3/mm3     Narrative:      The previously reported component NRBC is no longer being reported. Previous result was 0.0 /100 WBC (Reference Range: 0.0-0.2 /100 WBC) on 7/7/2021 at 0434 EDT.    Basic Metabolic Panel [[id number]]  (Abnormal) Collected: 07/07/21 0323    Specimen: Blood Updated: 07/07/21 0452     Glucose 113 mg/dL      BUN 25 mg/dL      Creatinine 1.19 mg/dL      Sodium 140 mmol/L      Potassium 4.4 mmol/L      Comment: Slight hemolysis detected by analyzer. Results may be affected.        Chloride 108 mmol/L      CO2 21.0 mmol/L      Calcium 8.5 mg/dL      eGFR Non African Amer 44 mL/min/1.73      BUN/Creatinine Ratio 21.0     Anion Gap 11.0 mmol/L      Narrative:      GFR Normal >60  Chronic Kidney Disease <60  Kidney Failure <15      Phosphorus [[id number]]  (Abnormal) Collected: 07/07/21 0323    Specimen: Blood Updated: 07/07/21 0452     Phosphorus 5.0 mg/dL     C-reactive Protein [[id number]]  (Abnormal) Collected: 07/07/21 0323    Specimen: Blood Updated: 07/07/21 0452     C-Reactive Protein 12.39 mg/dL         No results found for: HGBA1C            Lab Results   Component Value Date    LIPASE 12 (L) 07/02/2021     Lab Results   Component Value Date    CHOL 374 (H) 01/03/2020    TRIG 311 (H) 01/03/2020    HDL 50 01/03/2020     (H) 01/03/2020       Lab Results   Lab Value Date/Time    FINALDX  07/02/2021 1828     Absolute atypical lymphocytosis suspicious for CLL/SLL      FINALDX  12/31/2020 0205     Leukocytosis with absolute atypical lymphocytosis  Anemia  No blasts identified  If CBC indices persist/worsen, consider flow cytometry or other studies as clinically indicated to exclude a lymphoproliferative disorder         Microbiology Results (last 10 days)     Procedure Component Value - Date/Time    S. Pneumo Ag Urine or CSF - Urine, Urine, Clean Catch [[id number]]  (Normal) Collected: 07/03/21 1312    Lab Status: Final result Specimen: Urine, Clean Catch Updated: 07/03/21 1441     Strep Pneumo Ag Negative    Legionella Antigen, Urine - Urine, Urine, Clean Catch [[id number]]  (Normal) Collected: 07/03/21 1312    Lab Status: Final result Specimen: Urine, Clean Catch Updated: 07/03/21 1441     LEGIONELLA ANTIGEN, URINE Negative    Respiratory Culture - Sputum, Cough [[id number]] Collected: 07/03/21 1308    Lab Status: Final result Specimen: Sputum from Cough Updated: 07/06/21 0948     Respiratory Culture Moderate growth (3+) Normal Respiratory Leanna: NO S.aureus/MRSA or Pseudomonas aeruginosa     Gram Stain Many (4+) WBCs per low power field      Many (4+) Epithelial cells per low power field      Moderate (3+) Mixed bacterial morphotypes seen on Gram Stain     MRSA Screen, PCR (Inpatient) - Swab, Nares [[id number]]  (Normal) Collected: 07/03/21 1143    Lab Status: Final result Specimen: Swab from Nares Updated: 07/03/21 1542     MRSA PCR No MRSA Detected    Respiratory Panel PCR w/COVID-19(SARS-CoV-2) LESLEE/VIVIANE/PAVAN/PAD/COR/MAD/JUAN DAVID In-House, NP Swab in UTM/VTM, 3-4 HR TAT - Swab, Nasopharynx [[id number]]  (Normal) Collected: 07/02/21 2216    Lab Status: Final result Specimen: Swab from Nasopharynx Updated: 07/02/21 2309     ADENOVIRUS, PCR Not Detected     Coronavirus 229E Not Detected     Coronavirus HKU1 Not Detected     Coronavirus NL63 Not Detected     Coronavirus OC43 Not Detected     COVID19 Not Detected     Human Metapneumovirus Not Detected     Human Rhinovirus/Enterovirus Not Detected     Influenza A PCR Not Detected     Influenza B PCR Not Detected     Parainfluenza Virus 1 Not Detected     Parainfluenza Virus 2 Not Detected     Parainfluenza Virus 3 Not Detected     Parainfluenza Virus 4 Not Detected     RSV, PCR Not Detected     Bordetella pertussis pcr Not Detected     Bordetella parapertussis PCR Not Detected     Chlamydophila pneumoniae PCR Not Detected     Mycoplasma pneumo by PCR Not Detected    Narrative:      In the setting of a positive respiratory panel with a viral infection PLUS a negative procalcitonin without other underlying concern for bacterial infection, consider observing off antibiotics or discontinuation of antibiotics and continue supportive care. If the respiratory panel is positive for atypical bacterial infection (Bordetella pertussis, Chlamydophila pneumoniae, or Mycoplasma pneumoniae), consider antibiotic de-escalation to target atypical bacterial infection.    Blood Culture - Blood, Leg, Left [[id number]] Collected: 07/02/21 1938    Lab Status: Preliminary result Specimen: Blood from Leg, Left Updated: 07/06/21 1945     Blood Culture No growth at 4 days    Urine Culture - Urine, Urine, Catheter [[id number]]  (Abnormal)  (Susceptibility)  Collected: 07/02/21 1921    Lab Status: Final result Specimen: Urine, Catheter Updated: 07/04/21 0132     Urine Culture >100,000 CFU/mL Escherichia coli    Susceptibility      Escherichia coli      JUAN      Ampicillin Susceptible      Ampicillin + Sulbactam Susceptible      Cefazolin Susceptible      Cefepime Susceptible      Ceftazidime Susceptible      Ceftriaxone Susceptible      Gentamicin Susceptible      Levofloxacin Susceptible      Nitrofurantoin Susceptible      Piperacillin + Tazobactam Susceptible      Tetracycline Susceptible      Trimethoprim + Sulfamethoxazole Susceptible               Linear View                   Blood Culture - Blood, Arm, Left [[id number]]  (Abnormal) Collected: 07/02/21 1828    Lab Status: Final result Specimen: Blood from Arm, Left Updated: 07/05/21 1135     Blood Culture Staphylococcus, coagulase negative     Comment: Probable contaminant requires clinical correlation, susceptibility not performed unless requested by physician.          Isolated from Aerobic Bottle     Gram Stain Aerobic Bottle Gram positive cocci in clusters    Blood Culture ID, PCR - Blood, Arm, Left [[id number]]  (Abnormal) Collected: 07/02/21 1828    Lab Status: Final result Specimen: Blood from Arm, Left Updated: 07/03/21 1855     BCID, PCR Staphylococcus spp, not aureus. Identification by BCID PCR.     BOTTLE TYPE Aerobic Bottle          ECG/EMG Results (most recent)     Procedure Component Value Units Date/Time    ECG 12 Lead [[id number]] Collected: 07/02/21 1637     Updated: 07/04/21 0947     QT Interval 322 ms     Narrative:      HEART RATE= 104  bpm  RR Interval= 580  ms  LA Interval= 123  ms  P Horizontal Axis= -27  deg  P Front Axis= 17  deg  QRSD Interval= 89  ms  QT Interval= 322  ms  QRS Axis= 48  deg  T Wave Axis= 63  deg  - OTHERWISE NORMAL ECG -  Sinus tachycardia  Low voltage, precordial leads  When compared with ECG of 30-Dec-2020 13:14:52,  Significant change in rhythm  Electronically Signed  By: Holden Ortiz (Simeon) 04-Jul-2021 09:33:16  Date and Time of Study: 2021-07-02 16:37:22          Results for orders placed during the hospital encounter of 12/30/20    Duplex Venous Lower Extremity - Bilateral CAR    Interpretation Summary  · Normal bilateral lower extremity venous duplex scan.      Results for orders placed during the hospital encounter of 12/30/20    Adult Transthoracic Echo Complete W/ Cont if Necessary Per Protocol    Interpretation Summary  · Estimated left ventricular EF = 65% Estimated left ventricular EF was in agreement with the calculated left ventricular EF. Left ventricular systolic function is normal.  · There is mainly affecting the non-coronary and left coronary cusp(s).  · The right ventricular cavity is mildly dilated.  · Estimated right ventricular systolic pressure from tricuspid regurgitation is normal (<35 mmHg).      CT Chest Without Contrast Diagnostic    Result Date: 7/5/2021  1. Left lower lobe consolidation likely representing pneumonia. 2. Retained ingested material within the distal esophagus with small hiatal hernia. This may indicate stasis or reflux.    Electronically Signed By-Antony Lopez MD On:7/5/2021 3:16 PM This report was finalized on [id number] by  Antony Lopez MD.    XR Chest 1 View    Result Date: 7/7/2021   1. Cardiomegaly and chronic changes about this chest. There is no obvious pneumonia or congestive change   Electronically Signed By-Vinicius Cox MD On:7/7/2021 7:12 AM This report was finalized on [id number] by  Vinicius Cox MD.    XR Chest 1 View    Result Date: 7/2/2021  Increased left lower lung airspace opacities, which may be due to atelectasis and/or pneumonia.  Electronically Signed By-Jacquie Arroyo MD On:7/2/2021 5:58 PM This report was finalized on [id number] by  Jacquie Arroyo MD.          Xrays, labs reviewed personally by physician.    Medication Review:   I have reviewed the patient's current medication  list      Scheduled Meds  amLODIPine, 10 mg, Oral, Daily  anastrozole, 1 mg, Oral, Daily  arformoterol, 15 mcg, Nebulization, BID - RT  budesonide, 0.5 mg, Nebulization, BID - RT  cetirizine, 10 mg, Oral, Nightly  chlorthalidone, 25 mg, Oral, Daily  docusate sodium, 100 mg, Oral, BID  enoxaparin, 40 mg, Subcutaneous, Daily  ferrous sulfate, 324 mg, Oral, Once per day on Mon Wed Fri  guaiFENesin, 1,200 mg, Oral, Q12H  ipratropium-albuterol, 3 mL, Nebulization, Q4H - RT  ipratropium-albuterol, 3 mL, Nebulization, Q4H - RT  levothyroxine, 100 mcg, Oral, Q AM  losartan, 100 mg, Oral, Daily  mirtazapine, 15 mg, Oral, Nightly  pantoprazole, 40 mg, Oral, QAM AC  piperacillin-tazobactam, 3.375 g, Intravenous, Q8H  rOPINIRole, 0.25 mg, Oral, Nightly  rosuvastatin, 10 mg, Oral, Nightly  sertraline, 150 mg, Oral, Nightly  sodium chloride, 3 mL, Intravenous, Q12H  vitamin D3, 5,000 Units, Oral, Daily        Meds Infusions  sodium chloride, 50 mL/hr, Last Rate: 50 mL/hr (07/07/21 0211)        Meds PRN  •  acetaminophen **OR** acetaminophen **OR** acetaminophen  •  benzonatate  •  melatonin  •  metoclopramide  •  nitroglycerin  •  ondansetron **OR** ondansetron  •  oxyCODONE  •  [COMPLETED] Insert peripheral IV **AND** sodium chloride  •  sodium chloride        Assessment/Plan   Assessment/Plan     Active Hospital Problems    Diagnosis  POA   • Acute UTI (urinary tract infection) [N39.0]  Yes   • Leukocytosis [D72.829]  Yes   • CKD (chronic kidney disease) stage 3, GFR 30-59 ml/min (CMS/HCC) [N18.30]  Yes   • Pneumonia of left lung due to infectious organism [J18.9]  Yes      Resolved Hospital Problems   No resolved problems to display.       MEDICAL DECISION MAKING COMPLEXITY BY PROBLEM:     Sepsis secondary to LLL pneumonia:  -Rocephin and doxycycline discontinued  -Continue Zosyn  -CT chest 7/5/2021--> pneumonia  -Consulted pulmonology 7/6/2021      Blood culture 1/2 on 7/2/2021:  -Suspected contamination  -Grew  coagulase-negative staph    Acute on chronic hypoxemic respiratory failure due to pneumonia:  -Titrate pulse ox> 92%    Acute UTI d/t E coli - POA  - on antibiotics as above      Generalized weakness with falls:  -Likely secondary to acute illness/infection  -consulted PT/OT--> recommended inpatient rehab      Acute Kidney Injury on Chronic kidney disease stage III:  - baseline Cr 1-1.3  - continue IV fluids      Hypocalcemia: Repleted  - replace IV 7/3/21     Obesity  -  on lifestyle changes as appropriate      Chronic HFpEF:  - stable, not in exacerbation  -TTE 1/6/2021 --> LVEF 65%      Hypothyroidism  - on home levothyroxine     Anxiety/depression: Controlled  - on home Remeron, Zoloft     Hyperlipidemia:  -Continue statin        New onset diabetes Mellitus type 2:  - Hgb A1c 6.6  - add consistent carb diet  - consult diabetes educator      Normocytic anemia:  - hemoglobin 10.5      Essential hypertension  - controlled on home Norvasc, chlorthalidone, losartan     GERD  - on PPI and Reglan     Restless leg syndrome  - on ropinirole     History breast cancer s/p right lumpectomy:  - on anastrozole     allergic rhinitis: Controlled  - on cetirizine     Chronic constipation:  -Continue stool softeners        VTE Prophylaxis -   Mechanical Order History:     None      Pharmalogical Order History:      Ordered     Dose Route Frequency Stop    07/02/21 2010  enoxaparin (LOVENOX) syringe 40 mg      40 mg SC Daily --                  Code Status -   Code Status and Medical Interventions:   Ordered at: 07/02/21 2010     Code Status:    CPR     Medical Interventions (Level of Support Prior to Arrest):    Full       This patient has been examined wearing appropriate Personal Protective Equipment and discussed with nursing. 07/07/21        Discharge Planning  defer        Electronically signed by Martinez Hitchcock DO, 07/07/21, 16:55 EDT.  [facility] Hospitalist Team

## 2021-07-07 NOTE — THERAPY TREATMENT NOTE
Subjective: Pt agreeable to therapeutic plan of care.    Objective:     Bed mobility - Mod-A to eob. Pt. able to wt. shift slowly towards HOB c several short rests. Reports feeling light headed.  Transfers - Min A c rwx.for sps, requirng assist to manage clothing and vcs for safe hand placement c rwx. use.  Ambulation - 3 feet for sps to b/s commode.   Seated a/aarom exercises in available planes.   Pain: 7 VAS  Education: Provided education on importance of mobility and skilled verbal / tactile cueing throughout intervention.     Assessment: Diane Guan presents with functional mobility impairments which indicate the need for skilled intervention. Tolerating session today without incident. Will continue to follow and progress as tolerated.     Plan/Recommendations:   Pt would benefit from Inpatient Rehabilitation placement at discharge from facility and requires no DME at discharge.   Pt desires Inpatient Rehabilitation placement at discharge. Pt cooperative; agreeable to therapeutic recommendations and plan of care.     Basic Mobility 6-click:  Rollin = Total, A lot = 2, A little = 3; 4 = None  Supine>Sit:   1 = Total, A lot = 2, A little = 3; 4 = None   Sit>Stand with arms:  1 = Total, A lot = 2, A little = 3; 4 = None  Bed>Chair:   1 = Total, A lot = 2, A little = 3; 4 = None  Ambulate in room:  1 = Total, A lot = 2, A little = 3; 4 = None  3-5 Steps with railin = Total, A lot = 2, A little = 3; 4 = None  Score: 12    Modified [place]: 4 = Moderately severe disability (Unable to attend to own bodily needs without assistance, and unable to walk unassisted)     Post-Tx Position: Supine with HOB Elevated, Alarms activated and Call light and personal items within reach  PPE: gloves, surgical mask, eyewear protection

## 2021-07-07 NOTE — NURSING NOTE
contacted Respiratory, advised pt. Currently stating 87% , repositioned in bed, head of bed elevated, with no change in O2.  Per RT increase O2 by 2 liters.  Will be up to see pt.

## 2021-07-07 NOTE — PROGRESS NOTES
"PULMONARY CRITICAL CARE Progress  NOTE      PATIENT IDENTIFICATION:  Name: Diane Guan  MRN: [id number]  :  1940     Age: 81 y.o.  Sex: female    DATE OF Note:  2021   Referring Physician: Violet Fletcher DO                  Subjective:   Still coughing purulent secretions  Less  SOB no chest pain, no nausea or vomiting, no change in bowel habit, no dysuria,  no new  skin rash or itching.      Objective:  tMax 24 hrs: Temp (24hrs), Av °F (36.7 °C), Min:97.5 °F (36.4 °C), Max:98.5 °F (36.9 °C)      Vitals Ranges:   Temp:  [97.5 °F (36.4 °C)-98.5 °F (36.9 °C)] 97.5 °F (36.4 °C)  Heart Rate:  [72-95] 72  Resp:  [18-22] 21  BP: (148-186)/(65-76) 181/72    Intake and Output Last 3 Shifts:   I/O last 3 completed shifts:  In:  [P.O.:840; I.V.:1000; IV Piggyback:200]  Out: -     Exam:  BP (!) 181/72 (BP Location: Left leg, Patient Position: Lying)   Pulse 72   Temp 97.5 °F (36.4 °C) (Oral)   Resp 21   Ht 157.5 cm (62\")   Wt 88.7 kg (195 lb 8.8 oz)   SpO2 90%   BMI 35.77 kg/m²     General Appearance:     HEENT:  Normocephalic, without obvious abnormality, Conjunctiva/corneas clear,.  Normal external ear canals, Nares normal, no drainage     Neck:  Supple, symmetrical, trachea midline. No JVD.  Lungs /Chest wall:   Bilateral basal rhonchi, respirations unlabored symmetrical wall movement.     Heart:  Regular rate and rhythm, systolic murmur PMI left sternal border  Abdomen: Soft, non-tender, no masses, no organomegaly.    Extremities: Trace edema no clubbing or Cyanosis        Medications:    Current Facility-Administered Medications:   •  acetaminophen (TYLENOL) tablet 650 mg, 650 mg, Oral, Q4H PRN, 650 mg at 21 0431 **OR** acetaminophen (TYLENOL) 160 MG/5ML solution 650 mg, 650 mg, Oral, Q4H PRN, 650 mg at 21 1134 **OR** acetaminophen (TYLENOL) suppository 650 mg, 650 mg, Rectal, Q4H PRN, April Velasquez, SIOMARA  •  amLODIPine (NORVASC) tablet 10 mg, 10 mg, Oral, Daily, " April Velasquez APRN, 10 mg at 07/07/21 0817  •  anastrozole (ARIMIDEX) tablet 1 mg, 1 mg, Oral, Daily, April Velasquez APRN, 1 mg at 07/07/21 0815  •  arformoterol (BROVANA) nebulizer solution 15 mcg, 15 mcg, Nebulization, BID - RT, Wily Rutherford MD, 15 mcg at 07/06/21 1954  •  benzonatate (TESSALON) capsule 100 mg, 100 mg, Oral, TID PRN, April Velasquez APRN, 100 mg at 07/05/21 0431  •  budesonide (PULMICORT) nebulizer solution 0.5 mg, 0.5 mg, Nebulization, BID - RT, Wily Rutherford MD, 0.5 mg at 07/07/21 0644  •  budesonide (PULMICORT) nebulizer solution 0.5 mg, 0.5 mg, Nebulization, BID - RT, Wily Rutherford MD  •  cetirizine (zyrTEC) tablet 10 mg, 10 mg, Oral, Nightly, April Velasquez APRN, 10 mg at 07/06/21 2104  •  chlorthalidone (HYGROTON) tablet 25 mg, 25 mg, Oral, Daily, April Velasquez APRN, 25 mg at 07/07/21 0816  •  docusate sodium (COLACE) capsule 100 mg, 100 mg, Oral, BID, April Velasquez APRN, 100 mg at 07/07/21 0816  •  enoxaparin (LOVENOX) syringe 40 mg, 40 mg, Subcutaneous, Daily, April Velasquez APRN, 40 mg at 07/06/21 1536  •  ferrous sulfate EC tablet 324 mg, 324 mg, Oral, Once per day on Mon Wed Fri, April Velasquez APRN, 324 mg at 07/07/21 0818  •  guaiFENesin (MUCINEX) 12 hr tablet 1,200 mg, 1,200 mg, Oral, Q12H, Violet Fletcher DO, 1,200 mg at 07/07/21 0816  •  ipratropium-albuterol (DUO-NEB) nebulizer solution 3 mL, 3 mL, Nebulization, Q4H PRN, April Velasquez APRN, 3 mL at 07/05/21 0449  •  ipratropium-albuterol (DUO-NEB) nebulizer solution 3 mL, 3 mL, Nebulization, Q4H - RT, Wily Rutherford MD, 3 mL at 07/07/21 0644  •  ipratropium-albuterol (DUO-NEB) nebulizer solution 3 mL, 3 mL, Nebulization, Q4H - RT, Wily Rutherford MD  •  levothyroxine (SYNTHROID, LEVOTHROID) tablet 100 mcg, 100 mcg, Oral, Q AM, April Velasquez APRN, 100 mcg at 07/07/21 0514  •  losartan (COZAAR) tablet 100 mg, 100 mg, Oral, Daily,  Romeo-April Lyons APRN, 100 mg at 07/07/21 0816  •  melatonin tablet 5 mg, 5 mg, Oral, Nightly PRN, April Velasquez, APRN, 5 mg at 07/06/21 2103  •  metoclopramide (REGLAN) tablet 5 mg, 5 mg, Oral, TID PRN, April Velasquez, APRN, 5 mg at 07/03/21 2135  •  mirtazapine (REMERON) tablet 15 mg, 15 mg, Oral, Nightly, Romeo-April Lyons, APRN, 15 mg at 07/06/21 2103  •  nitroglycerin (NITROSTAT) SL tablet 0.4 mg, 0.4 mg, Sublingual, Q5 Min PRN, April Velasquez, APRN  •  ondansetron (ZOFRAN) tablet 4 mg, 4 mg, Oral, Q6H PRN **OR** ondansetron (ZOFRAN) injection 4 mg, 4 mg, Intravenous, Q6H PRN, April Velasquez, APRN, 4 mg at 07/04/21 1134  •  oxyCODONE (ROXICODONE) immediate release tablet 5 mg, 5 mg, Oral, Q4H PRN, Martinez Hitchcock DO, 5 mg at 07/06/21 2103  •  pantoprazole (PROTONIX) EC tablet 40 mg, 40 mg, Oral, QAM AC, April Velasquez, APRN, 40 mg at 07/07/21 0818  •  piperacillin-tazobactam (ZOSYN) IVPB 3.375 g in 100 mL NS (CD), 3.375 g, Intravenous, Q8H, Violet Fletcher DO, Last Rate: 25 mL/hr at 07/07/21 0818, 3.375 g at 07/07/21 0818  •  rOPINIRole (REQUIP) tablet 0.25 mg, 0.25 mg, Oral, Nightly, Essing-April Lyons, APRN, 0.25 mg at 07/06/21 2103  •  rosuvastatin (CRESTOR) tablet 10 mg, 10 mg, Oral, Nightly, Essing-Serena, April, APRN, 10 mg at 07/06/21 2103  •  sertraline (ZOLOFT) tablet 150 mg, 150 mg, Oral, Nightly, April Velasquez APRN, 150 mg at 07/06/21 2104  •  [COMPLETED] Insert peripheral IV, , , Once **AND** sodium chloride 0.9 % flush 10 mL, 10 mL, Intravenous, PRN, Karie Pardo APRN  •  sodium chloride 0.9 % flush 3 mL, 3 mL, Intravenous, Q12H, April Velasquez APRN, 3 mL at 07/07/21 0821  •  sodium chloride 0.9 % flush 3-10 mL, 3-10 mL, Intravenous, PRN, April Velasquez APRN  •  sodium chloride 0.9 % infusion, 50 mL/hr, Intravenous, Continuous, April Velasquez APRN, Last Rate: 50 mL/hr at 07/07/21 0211, 50 mL/hr at  07/07/21 0211  •  vitamin D3 capsule 5,000 Units, 5,000 Units, Oral, Daily, April Velasquez APRN, 5,000 Units at 07/07/21 0816    Data Review:  All labs (24hrs):   Recent Results (from the past 24 hour(s))   Basic Metabolic Panel    Collection Time: 07/07/21  3:23 AM    Specimen: Blood   Result Value Ref Range    Glucose 113 (H) 65 - 99 mg/dL    BUN 25 (H) 8 - 23 mg/dL    Creatinine 1.19 (H) 0.57 - 1.00 mg/dL    Sodium 140 136 - 145 mmol/L    Potassium 4.4 3.5 - 5.2 mmol/L    Chloride 108 (H) 98 - 107 mmol/L    CO2 21.0 (L) 22.0 - 29.0 mmol/L    Calcium 8.5 (L) 8.6 - 10.5 mg/dL    eGFR Non African Amer 44 (L) >60 mL/min/1.73    BUN/Creatinine Ratio 21.0 7.0 - 25.0    Anion Gap 11.0 5.0 - 15.0 mmol/L   C-reactive Protein    Collection Time: 07/07/21  3:23 AM    Specimen: Blood   Result Value Ref Range    C-Reactive Protein 12.39 (H) 0.00 - 0.50 mg/dL   Phosphorus    Collection Time: 07/07/21  3:23 AM    Specimen: Blood   Result Value Ref Range    Phosphorus 5.0 (H) 2.5 - 4.5 mg/dL   CBC Auto Differential    Collection Time: 07/07/21  3:23 AM    Specimen: Blood   Result Value Ref Range    WBC 15.50 (H) 3.40 - 10.80 10*3/mm3    RBC 3.10 (L) 3.77 - 5.28 10*6/mm3    Hemoglobin 8.5 (L) 12.0 - 15.9 g/dL    Hematocrit 27.1 (L) 34.0 - 46.6 %    MCV 87.5 79.0 - 97.0 fL    MCH 27.5 26.6 - 33.0 pg    MCHC 31.5 31.5 - 35.7 g/dL    RDW 16.1 (H) 12.3 - 15.4 %    RDW-SD 50.3 37.0 - 54.0 fl    MPV 9.0 6.0 - 12.0 fL    Platelets 237 140 - 450 10*3/mm3   Scan Slide    Collection Time: 07/07/21  3:23 AM    Specimen: Blood   Result Value Ref Range    Scan Slide     Manual Differential    Collection Time: 07/07/21  3:23 AM    Specimen: Blood   Result Value Ref Range    Neutrophil % 33.0 (L) 42.7 - 76.0 %    Lymphocyte % 64.0 (H) 19.6 - 45.3 %    Monocyte % 3.0 (L) 5.0 - 12.0 %    Neutrophils Absolute 5.12 1.70 - 7.00 10*3/mm3    Lymphocytes Absolute 9.92 (H) 0.70 - 3.10 10*3/mm3    Monocytes Absolute 0.47 0.10 - 0.90 10*3/mm3     Anisocytosis Slight/1+ None Seen    Poikilocytes Slight/1+ None Seen    Smudge Cells Slight/1+ None Seen    Platelet Morphology Normal Normal        Imaging:  XR Chest 1 View  Narrative: XR CHEST 1 VW-     Date of Exam: 7/7/2021 5:35 AM     Indication: Shortness of breath; J18.9-Pneumonia, unspecified organism;  R53.1-Weakness; W19.XXXA-Unspecified fall, initial encounter.     Comparison:  July 2, 2021     Technique: Single view of the chest was obtained.     FINDINGS: The heart is enlarged. No consolidative process or congestive  changes noted. Chronic changes are noted about this chest. The  mediastinum is unrevealing. Bones reveal degenerative changes.     Impression:    1. Cardiomegaly and chronic changes about this chest. There is no  obvious pneumonia or congestive change        Electronically Signed By-Vinicius Cox MD On:7/7/2021 7:12 AM  This report was finalized on [id number] by  Vinicius Cox MD.       ASSESSMENT:  Pneumonia of left lung due to infectious organism  COPD exacerebration  Obstructive sleep apnea acute UTI (urinary tract infection)    Leukocytosis    CKD (chronic kidney disease) stage 3, GFR 30-59 ml/min (CMS/[facility])        PLAN:  Antibiotics  Follow-up with a chest x-ray if no improvement might consider bronchoscopy  Bronchodilator  Inhaled corticosteroids  Electrolytes/ glycemic control  DVT and GI prophylaxis.    Total Critical care time in direct medical management (   ) minutes  Wily Rutherford MD. D, ABSM.     7/7/2021  11:49 EDT

## 2021-07-07 NOTE — PLAN OF CARE
Goal Outcome Evaluation:      Per Hospitalist note     Sepsis secondary to LLL pneumonia:  -Rocephin and doxycycline discontinued  -Continue Zosyn  -CT chest 7/5/2021--> pneumonia  -Consulted pulmonology 7/6/2021     Acute on chronic hypoxemic respiratory failure due to pneumonia:  -Titrate pulse ox> 92%     Acute UTI d/t E coli - POA  - on antibiotics as above      Generalized weakness with falls:  -Likely secondary to acute illness/infection  -consulted PT/OT--> recommended inpatient rehab        Acute Kidney Injury on Chronic kidney disease stage III:  - baseline Cr 1-1.3  - continue IV fluids      Hypocalcemia: Repleted  - replace IV 7/3/21     Obesity  -  on lifestyle changes as appropriate      Chronic HFpEF:  - stable, not in exacerbation  -TTE 1/6/2021 --> LVEF 65%

## 2021-07-07 NOTE — PLAN OF CARE
Pt would benefit from Inpatient Rehabilitation placement at discharge from facility and requires no DME at discharge.   Pt desires Inpatient Rehabilitation placement at discharge. Pt cooperative; agreeable to therapeutic recommendations and plan of care.

## 2021-07-08 ENCOUNTER — READMISSION MANAGEMENT (OUTPATIENT)
Dept: CALL CENTER | Facility: HOSPITAL | Age: 81
End: 2021-07-08

## 2021-07-08 ENCOUNTER — APPOINTMENT (OUTPATIENT)
Dept: GENERAL RADIOLOGY | Facility: HOSPITAL | Age: 81
End: 2021-07-08

## 2021-07-08 VITALS
OXYGEN SATURATION: 92 % | BODY MASS INDEX: 36.31 KG/M2 | WEIGHT: 197.31 LBS | HEIGHT: 62 IN | SYSTOLIC BLOOD PRESSURE: 155 MMHG | TEMPERATURE: 98.2 F | RESPIRATION RATE: 20 BRPM | HEART RATE: 78 BPM | DIASTOLIC BLOOD PRESSURE: 70 MMHG

## 2021-07-08 PROBLEM — J18.9 SEPSIS DUE TO PNEUMONIA: Status: ACTIVE | Noted: 2021-07-08

## 2021-07-08 PROBLEM — A41.9 SEPSIS DUE TO PNEUMONIA (HCC): Status: ACTIVE | Noted: 2021-07-08

## 2021-07-08 PROBLEM — J96.21 ACUTE ON CHRONIC RESPIRATORY FAILURE WITH HYPOXEMIA (HCC): Status: ACTIVE | Noted: 2021-07-08

## 2021-07-08 PROBLEM — D72.829 LEUKOCYTOSIS: Status: RESOLVED | Noted: 2021-07-03 | Resolved: 2021-07-08

## 2021-07-08 PROBLEM — N39.0 ACUTE UTI (URINARY TRACT INFECTION): Status: RESOLVED | Noted: 2021-07-03 | Resolved: 2021-07-08

## 2021-07-08 LAB
ANION GAP SERPL CALCULATED.3IONS-SCNC: 13 MMOL/L (ref 5–15)
ANISOCYTOSIS BLD QL: ABNORMAL
BUN SERPL-MCNC: 15 MG/DL (ref 8–23)
BUN/CREAT SERPL: 16.1 (ref 7–25)
CALCIUM SPEC-SCNC: 8.6 MG/DL (ref 8.6–10.5)
CHLORIDE SERPL-SCNC: 107 MMOL/L (ref 98–107)
CO2 SERPL-SCNC: 17 MMOL/L (ref 22–29)
CREAT SERPL-MCNC: 0.93 MG/DL (ref 0.57–1)
CRP SERPL-MCNC: 8.32 MG/DL (ref 0–0.5)
D DIMER PPP FEU-MCNC: 1.19 MG/L (FEU) (ref 0–0.59)
DEPRECATED RDW RBC AUTO: 50.3 FL (ref 37–54)
ERYTHROCYTE [DISTWIDTH] IN BLOOD BY AUTOMATED COUNT: 16 % (ref 12.3–15.4)
GFR SERPL CREATININE-BSD FRML MDRD: 58 ML/MIN/1.73
GLUCOSE SERPL-MCNC: 101 MG/DL (ref 65–99)
HCT VFR BLD AUTO: 28.3 % (ref 34–46.6)
HGB BLD-MCNC: 8.7 G/DL (ref 12–15.9)
LYMPHOCYTES # BLD MANUAL: 8.7 10*3/MM3 (ref 0.7–3.1)
LYMPHOCYTES NFR BLD MANUAL: 58 % (ref 19.6–45.3)
LYMPHOCYTES NFR BLD MANUAL: 6 % (ref 5–12)
MCH RBC QN AUTO: 27.1 PG (ref 26.6–33)
MCHC RBC AUTO-ENTMCNC: 30.6 G/DL (ref 31.5–35.7)
MCV RBC AUTO: 88.5 FL (ref 79–97)
MONOCYTES # BLD AUTO: 0.9 10*3/MM3 (ref 0.1–0.9)
NEUTROPHILS # BLD AUTO: 5.4 10*3/MM3 (ref 1.7–7)
NEUTROPHILS NFR BLD MANUAL: 32 % (ref 42.7–76)
NEUTS BAND NFR BLD MANUAL: 4 % (ref 0–5)
NT-PROBNP SERPL-MCNC: 3656 PG/ML (ref 0–1800)
PHOSPHATE SERPL-MCNC: 3.7 MG/DL (ref 2.5–4.5)
PLATELET # BLD AUTO: 254 10*3/MM3 (ref 140–450)
PMV BLD AUTO: 9.3 FL (ref 6–12)
POTASSIUM SERPL-SCNC: 4.3 MMOL/L (ref 3.5–5.2)
RBC # BLD AUTO: 3.2 10*6/MM3 (ref 3.77–5.28)
SCAN SLIDE: NORMAL
SMALL PLATELETS BLD QL SMEAR: ADEQUATE
SMUDGE CELLS BLD QL SMEAR: ABNORMAL
SODIUM SERPL-SCNC: 137 MMOL/L (ref 136–145)
WBC # BLD AUTO: 15 10*3/MM3 (ref 3.4–10.8)

## 2021-07-08 PROCEDURE — 25010000002 PIPERACILLIN SOD-TAZOBACTAM PER 1 G: Performed by: INTERNAL MEDICINE

## 2021-07-08 PROCEDURE — 94799 UNLISTED PULMONARY SVC/PX: CPT

## 2021-07-08 PROCEDURE — 83880 ASSAY OF NATRIURETIC PEPTIDE: CPT | Performed by: HOSPITALIST

## 2021-07-08 PROCEDURE — 85007 BL SMEAR W/DIFF WBC COUNT: CPT | Performed by: HOSPITALIST

## 2021-07-08 PROCEDURE — 71045 X-RAY EXAM CHEST 1 VIEW: CPT

## 2021-07-08 PROCEDURE — 86140 C-REACTIVE PROTEIN: CPT | Performed by: HOSPITALIST

## 2021-07-08 PROCEDURE — 87205 SMEAR GRAM STAIN: CPT | Performed by: INTERNAL MEDICINE

## 2021-07-08 PROCEDURE — 80048 BASIC METABOLIC PNL TOTAL CA: CPT | Performed by: HOSPITALIST

## 2021-07-08 PROCEDURE — 85379 FIBRIN DEGRADATION QUANT: CPT | Performed by: HOSPITALIST

## 2021-07-08 PROCEDURE — 99238 HOSP IP/OBS DSCHRG MGMT 30/<: CPT | Performed by: HOSPITALIST

## 2021-07-08 PROCEDURE — 94618 PULMONARY STRESS TESTING: CPT

## 2021-07-08 PROCEDURE — 85025 COMPLETE CBC W/AUTO DIFF WBC: CPT | Performed by: HOSPITALIST

## 2021-07-08 PROCEDURE — 84100 ASSAY OF PHOSPHORUS: CPT | Performed by: HOSPITALIST

## 2021-07-08 PROCEDURE — 87070 CULTURE OTHR SPECIMN AEROBIC: CPT | Performed by: INTERNAL MEDICINE

## 2021-07-08 RX ORDER — AMOXICILLIN AND CLAVULANATE POTASSIUM 875; 125 MG/1; MG/1
1 TABLET, FILM COATED ORAL 2 TIMES DAILY
Qty: 16 TABLET | Refills: 0 | Status: SHIPPED | OUTPATIENT
Start: 2021-07-08 | End: 2021-07-16

## 2021-07-08 RX ORDER — BUMETANIDE 0.25 MG/ML
0.5 INJECTION INTRAMUSCULAR; INTRAVENOUS ONCE
Status: DISCONTINUED | OUTPATIENT
Start: 2021-07-08 | End: 2021-07-08

## 2021-07-08 RX ORDER — BLOOD SUGAR DIAGNOSTIC
1 STRIP MISCELLANEOUS DAILY
Qty: 100 EACH | Refills: 0 | Status: SHIPPED | OUTPATIENT
Start: 2021-07-08

## 2021-07-08 RX ORDER — BUMETANIDE 0.25 MG/ML
1 INJECTION INTRAMUSCULAR; INTRAVENOUS EVERY 12 HOURS
Status: DISCONTINUED | OUTPATIENT
Start: 2021-07-08 | End: 2021-07-08 | Stop reason: HOSPADM

## 2021-07-08 RX ORDER — BUMETANIDE 1 MG/1
1 TABLET ORAL DAILY
Qty: 14 TABLET | Refills: 0 | Status: SHIPPED | OUTPATIENT
Start: 2021-07-08 | End: 2021-10-25

## 2021-07-08 RX ORDER — GUAIFENESIN 600 MG/1
1200 TABLET, EXTENDED RELEASE ORAL EVERY 12 HOURS SCHEDULED
Qty: 60 TABLET | Refills: 0 | Status: SHIPPED | OUTPATIENT
Start: 2021-07-08 | End: 2021-10-25

## 2021-07-08 RX ORDER — ISOPROPYL ALCOHOL 700 MG/ML
1 CLOTH TOPICAL DAILY
Qty: 100 EACH | Refills: 0 | Status: SHIPPED | OUTPATIENT
Start: 2021-07-08

## 2021-07-08 RX ORDER — LANCETS
1 EACH MISCELLANEOUS DAILY
Qty: 100 EACH | Refills: 0 | Status: SHIPPED | OUTPATIENT
Start: 2021-07-08

## 2021-07-08 RX ORDER — AMOXICILLIN AND CLAVULANATE POTASSIUM 875; 125 MG/1; MG/1
1 TABLET, FILM COATED ORAL 2 TIMES DAILY
Qty: 6 TABLET | Refills: 0 | Status: SHIPPED | OUTPATIENT
Start: 2021-07-08 | End: 2021-07-08 | Stop reason: SDUPTHER

## 2021-07-08 RX ADMIN — PIPERACILLIN AND TAZOBACTAM 3.38 G: 3; .375 INJECTION, POWDER, LYOPHILIZED, FOR SOLUTION INTRAVENOUS at 01:00

## 2021-07-08 RX ADMIN — Medication 3 ML: at 09:00

## 2021-07-08 RX ADMIN — BENZONATATE 100 MG: 100 CAPSULE ORAL at 05:56

## 2021-07-08 RX ADMIN — DOCUSATE SODIUM 100 MG: 100 CAPSULE, LIQUID FILLED ORAL at 08:57

## 2021-07-08 RX ADMIN — LEVOTHYROXINE SODIUM 100 MCG: 0.1 TABLET ORAL at 05:54

## 2021-07-08 RX ADMIN — PANTOPRAZOLE SODIUM 40 MG: 40 TABLET, DELAYED RELEASE ORAL at 08:55

## 2021-07-08 RX ADMIN — Medication 5000 UNITS: at 08:56

## 2021-07-08 RX ADMIN — ANASTROZOLE 1 MG: 1 TABLET, COATED ORAL at 08:56

## 2021-07-08 RX ADMIN — ARFORMOTEROL TARTRATE 15 MCG: 15 SOLUTION RESPIRATORY (INHALATION) at 08:05

## 2021-07-08 RX ADMIN — BUMETANIDE 1 MG: 0.25 INJECTION INTRAMUSCULAR; INTRAVENOUS at 14:46

## 2021-07-08 RX ADMIN — IPRATROPIUM BROMIDE AND ALBUTEROL SULFATE 3 ML: 2.5; .5 SOLUTION RESPIRATORY (INHALATION) at 15:59

## 2021-07-08 RX ADMIN — AMLODIPINE BESYLATE 10 MG: 5 TABLET ORAL at 08:58

## 2021-07-08 RX ADMIN — IPRATROPIUM BROMIDE AND ALBUTEROL SULFATE 3 ML: 2.5; .5 SOLUTION RESPIRATORY (INHALATION) at 03:14

## 2021-07-08 RX ADMIN — CHLORTHALIDONE 25 MG: 25 TABLET ORAL at 08:57

## 2021-07-08 RX ADMIN — LOSARTAN POTASSIUM 100 MG: 50 TABLET, FILM COATED ORAL at 08:58

## 2021-07-08 RX ADMIN — IPRATROPIUM BROMIDE AND ALBUTEROL SULFATE 3 ML: 2.5; .5 SOLUTION RESPIRATORY (INHALATION) at 11:43

## 2021-07-08 RX ADMIN — GUAIFENESIN 1200 MG: 600 TABLET, EXTENDED RELEASE ORAL at 08:56

## 2021-07-08 RX ADMIN — BUDESONIDE 0.5 MG: 0.5 SUSPENSION RESPIRATORY (INHALATION) at 08:09

## 2021-07-08 RX ADMIN — PIPERACILLIN AND TAZOBACTAM 3.38 G: 3; .375 INJECTION, POWDER, LYOPHILIZED, FOR SOLUTION INTRAVENOUS at 08:59

## 2021-07-08 NOTE — PLAN OF CARE
Goal Outcome Evaluation:              Outcome Summary: Patient is on O2. Patient has IV fluids and IV antibiotics ordered (see orders and MAR). Pulmonology is on board. Will continue to observe.

## 2021-07-08 NOTE — PLAN OF CARE
Goal Outcome Evaluation:  Pt. Does seem stronger this shift, than in the last 24 hours.  Alert and oriented per norm.  Shortness of breath continues, and no changes in O2 requirement, remains at 10 liters high flow, humified.  Stats 90%.  Loose productive clear cough noted at times, pt. Has been instructed and given specimen cup to collect sputum for testing.  Continues with RT neb treatments every 4 hours.  Did receive prn order of Tessalon pearls early in the shift, - with some relief from coughing noted.  Taking Oxycodone for complaints of pain , left flank, pt. Relates pain to coughing.  Repeat Chest xray to be done in the a.m.  Possible bronchoscopy being determined.  No bowel movement this shift to completed occult stool testing.  Lungs remains course with crackles ronak.  Airway is patent.  Up x 1 to bedside commode.  Gait remains unstable, and SOB with exertion.  Fall precautions are in place, no falls or injuries to report since hospitalization.

## 2021-07-08 NOTE — PROGRESS NOTES
"PULMONARY CRITICAL CARE Progress  NOTE      PATIENT IDENTIFICATION:  Name: Diane Guan  MRN: [id number]  :  1940     Age: 81 y.o.  Sex: female    DATE OF Note:  2021   Referring Physician: Violet Fletcher DO                  Subjective:   Feeling better  Still having the cough most of the time nonproductive  Much less SOB no chest pain, no nausea or vomiting, no change in bowel habit, no dysuria,  no new  skin rash or itching.      Objective:  tMax 24 hrs: Temp (24hrs), Av.8 °F (36.6 °C), Min:97.5 °F (36.4 °C), Max:98.2 °F (36.8 °C)      Vitals Ranges:   Temp:  [97.5 °F (36.4 °C)-98.2 °F (36.8 °C)] 98.2 °F (36.8 °C)  Heart Rate:  [68-79] 76  Resp:  [18-21] 18  BP: (157-181)/(65-78) 157/76    Intake and Output Last 3 Shifts:   I/O last 3 completed shifts:  In: 1700 [P.O.:600; I.V.:1000; IV Piggyback:100]  Out: 400 [Urine:400]    Exam:  /76 (BP Location: Left leg, Patient Position: Lying)   Pulse 76   Temp 98.2 °F (36.8 °C) (Oral)   Resp 18   Ht 157.5 cm (62\")   Wt 89.5 kg (197 lb 5 oz)   SpO2 92%   BMI 36.09 kg/m²     General Appearance:   Alert awake getting up to the side of the bed  HEENT:  Normocephalic, without obvious abnormality, Conjunctiva/corneas clear,.  Normal external ear canals, Nares normal, no drainage     Neck:  Supple, symmetrical, trachea midline. No JVD.  Lungs /Chest wall:   Bilateral basal rhonchi, respirations unlabored symmetrical wall movement.     Heart:  Regular rate and rhythm, systolic murmur PMI left sternal border  Abdomen: Soft, non-tender, no masses, no organomegaly.    Extremities: Trace edema no clubbing or Cyanosis        Medications:    Current Facility-Administered Medications:   •  acetaminophen (TYLENOL) tablet 650 mg, 650 mg, Oral, Q4H PRN, 650 mg at 21 0431 **OR** acetaminophen (TYLENOL) 160 MG/5ML solution 650 mg, 650 mg, Oral, Q4H PRN, 650 mg at 21 1134 **OR** acetaminophen (TYLENOL) suppository 650 mg, 650 mg, Rectal, Q4H " PRN, April Velasquez APRN  •  amLODIPine (NORVASC) tablet 10 mg, 10 mg, Oral, Daily, April Velasquez APRN, 10 mg at 07/07/21 0817  •  anastrozole (ARIMIDEX) tablet 1 mg, 1 mg, Oral, Daily, April Velasquez APRN, 1 mg at 07/07/21 0815  •  arformoterol (BROVANA) nebulizer solution 15 mcg, 15 mcg, Nebulization, BID - RT, Wily Rutherford MD, 15 mcg at 07/08/21 0805  •  benzonatate (TESSALON) capsule 100 mg, 100 mg, Oral, TID PRN, April Velasquez APRN, 100 mg at 07/08/21 0556  •  budesonide (PULMICORT) nebulizer solution 0.5 mg, 0.5 mg, Nebulization, BID - RT, Wily Rutherford MD, 0.5 mg at 07/07/21 1845  •  cetirizine (zyrTEC) tablet 10 mg, 10 mg, Oral, Nightly, April Velasquez APRN, 10 mg at 07/07/21 2019  •  chlorthalidone (HYGROTON) tablet 25 mg, 25 mg, Oral, Daily, April Velasquez APRN, 25 mg at 07/07/21 0816  •  docusate sodium (COLACE) capsule 100 mg, 100 mg, Oral, BID, April Velasquez APRN, 100 mg at 07/07/21 2020  •  enoxaparin (LOVENOX) syringe 40 mg, 40 mg, Subcutaneous, Daily, April Velasquez APRN, 40 mg at 07/07/21 1544  •  ferrous sulfate EC tablet 324 mg, 324 mg, Oral, Once per day on Mon Wed Fri, April Velasquez APRN, 324 mg at 07/07/21 0818  •  guaiFENesin (MUCINEX) 12 hr tablet 1,200 mg, 1,200 mg, Oral, Q12H, Violet Fletcher DO, 1,200 mg at 07/07/21 2019  •  ipratropium-albuterol (DUO-NEB) nebulizer solution 3 mL, 3 mL, Nebulization, Q4H - RT, Wily Rutherford MD, 3 mL at 07/07/21 1502  •  levothyroxine (SYNTHROID, LEVOTHROID) tablet 100 mcg, 100 mcg, Oral, Q AM, April Velasquez APRN, 100 mcg at 07/08/21 0554  •  losartan (COZAAR) tablet 100 mg, 100 mg, Oral, Daily, April Velasquez APRN, 100 mg at 07/07/21 0816  •  melatonin tablet 5 mg, 5 mg, Oral, Nightly PRN, April Velasquez APRN, 5 mg at 07/07/21 2029  •  metoclopramide (REGLAN) tablet 5 mg, 5 mg, Oral, TID PRN, April Velasquez APRN, 5 mg at 07/03/21 2135  •   mirtazapine (REMERON) tablet 15 mg, 15 mg, Oral, Nightly, April Velasquez APRN, 15 mg at 07/07/21 2019  •  nitroglycerin (NITROSTAT) SL tablet 0.4 mg, 0.4 mg, Sublingual, Q5 Min PRN, April Velasquez APRN  •  ondansetron (ZOFRAN) tablet 4 mg, 4 mg, Oral, Q6H PRN **OR** ondansetron (ZOFRAN) injection 4 mg, 4 mg, Intravenous, Q6H PRN, April Velasquez APRN, 4 mg at 07/04/21 1134  •  oxyCODONE (ROXICODONE) immediate release tablet 5 mg, 5 mg, Oral, Q4H PRN, Martinez Hitchcock DO, 5 mg at 07/07/21 2029  •  pantoprazole (PROTONIX) EC tablet 40 mg, 40 mg, Oral, QAM AC, April Velasquez APRN, 40 mg at 07/07/21 0818  •  piperacillin-tazobactam (ZOSYN) IVPB 3.375 g in 100 mL NS (CD), 3.375 g, Intravenous, Q8H, Violet Fletcher DO, Last Rate: 25 mL/hr at 07/08/21 0100, 3.375 g at 07/08/21 0100  •  rOPINIRole (REQUIP) tablet 0.25 mg, 0.25 mg, Oral, Nightly, April Velasquez APRN, 0.25 mg at 07/07/21 2019  •  rosuvastatin (CRESTOR) tablet 10 mg, 10 mg, Oral, Nightly, April Velasquez APRN, 10 mg at 07/07/21 2019  •  sertraline (ZOLOFT) tablet 150 mg, 150 mg, Oral, Nightly, April Velasquez APRN, 150 mg at 07/07/21 2019  •  [COMPLETED] Insert peripheral IV, , , Once **AND** sodium chloride 0.9 % flush 10 mL, 10 mL, Intravenous, PRN, Karie Pardo, APRN  •  sodium chloride 0.9 % flush 3 mL, 3 mL, Intravenous, Q12H, April Velasquez APRN, 3 mL at 07/07/21 2020  •  sodium chloride 0.9 % flush 3-10 mL, 3-10 mL, Intravenous, PRN, April Velasquez APRN  •  sodium chloride 0.9 % infusion, 50 mL/hr, Intravenous, Continuous, April Velasquez APRN, Last Rate: 50 mL/hr at 07/07/21 0211, 50 mL/hr at 07/07/21 0211  •  vitamin D3 capsule 5,000 Units, 5,000 Units, Oral, Daily, April Velasquez APRN, 5,000 Units at 07/07/21 0816    Data Review:  All labs (24hrs):   Recent Results (from the past 24 hour(s))   CBC Auto Differential    Collection Time: 07/08/21  3:33 AM     Specimen: Blood   Result Value Ref Range    WBC 15.00 (H) 3.40 - 10.80 10*3/mm3    RBC 3.20 (L) 3.77 - 5.28 10*6/mm3    Hemoglobin 8.7 (L) 12.0 - 15.9 g/dL    Hematocrit 28.3 (L) 34.0 - 46.6 %    MCV 88.5 79.0 - 97.0 fL    MCH 27.1 26.6 - 33.0 pg    MCHC 30.6 (L) 31.5 - 35.7 g/dL    RDW 16.0 (H) 12.3 - 15.4 %    RDW-SD 50.3 37.0 - 54.0 fl    MPV 9.3 6.0 - 12.0 fL    Platelets 254 140 - 450 10*3/mm3   Scan Slide    Collection Time: 07/08/21  3:33 AM    Specimen: Blood   Result Value Ref Range    Scan Slide     Manual Differential    Collection Time: 07/08/21  3:33 AM    Specimen: Blood   Result Value Ref Range    Neutrophil % 32.0 (L) 42.7 - 76.0 %    Lymphocyte % 58.0 (H) 19.6 - 45.3 %    Monocyte % 6.0 5.0 - 12.0 %    Bands %  4.0 0.0 - 5.0 %    Neutrophils Absolute 5.40 1.70 - 7.00 10*3/mm3    Lymphocytes Absolute 8.70 (H) 0.70 - 3.10 10*3/mm3    Monocytes Absolute 0.90 0.10 - 0.90 10*3/mm3    Anisocytosis Slight/1+ None Seen    Smudge Cells Slight/1+ None Seen    Platelet Estimate Adequate Normal   Basic Metabolic Panel    Collection Time: 07/08/21  5:32 AM    Specimen: Blood   Result Value Ref Range    Glucose 101 (H) 65 - 99 mg/dL    BUN 15 8 - 23 mg/dL    Creatinine 0.93 0.57 - 1.00 mg/dL    Sodium 137 136 - 145 mmol/L    Potassium 4.3 3.5 - 5.2 mmol/L    Chloride 107 98 - 107 mmol/L    CO2 17.0 (L) 22.0 - 29.0 mmol/L    Calcium 8.6 8.6 - 10.5 mg/dL    eGFR Non African Amer 58 (L) >60 mL/min/1.73    BUN/Creatinine Ratio 16.1 7.0 - 25.0    Anion Gap 13.0 5.0 - 15.0 mmol/L   C-reactive Protein    Collection Time: 07/08/21  5:32 AM    Specimen: Blood   Result Value Ref Range    C-Reactive Protein 8.32 (H) 0.00 - 0.50 mg/dL   Phosphorus    Collection Time: 07/08/21  5:32 AM    Specimen: Blood   Result Value Ref Range    Phosphorus 3.7 2.5 - 4.5 mg/dL   BNP    Collection Time: 07/08/21  5:32 AM    Specimen: Blood   Result Value Ref Range    proBNP 3,656.0 (H) 0.0-1,800.0 pg/mL        Imaging:  XR Chest 1  View  Narrative: XR CHEST 1 VW-     Date of Exam: 7/7/2021 5:35 AM     Indication: Shortness of breath; J18.9-Pneumonia, unspecified organism;  R53.1-Weakness; W19.XXXA-Unspecified fall, initial encounter.     Comparison:  July 2, 2021     Technique: Single view of the chest was obtained.     FINDINGS: The heart is enlarged. No consolidative process or congestive  changes noted. Chronic changes are noted about this chest. The  mediastinum is unrevealing. Bones reveal degenerative changes.     Impression:    1. Cardiomegaly and chronic changes about this chest. There is no  obvious pneumonia or congestive change        Electronically Signed By-Vinicius Cox MD On:7/7/2021 7:12 AM  This report was finalized on [id number] by  Vinicius Cox MD.       ASSESSMENT:  Pneumonia of left lung due to infectious organism  COPD exacerebration  Obstructive sleep apnea acute UTI (urinary tract infection)    Leukocytosis    CKD (chronic kidney disease) stage 3, GFR 30-59 ml/min (CMS/[facility])        PLAN:  Antibiotics can switch to oral and follow-up as outpatient  But encouraged use I-S flutter valve at home also  We will follow up in the clinic  Bronchodilator  Inhaled corticosteroids  Electrolytes/ glycemic control  DVT and GI prophylaxis.    Total Critical care time in direct medical management (   ) minutes  Wily Rutherford MD. D, ABSM.     7/8/2021  08:08 EDT

## 2021-07-08 NOTE — CASE MANAGEMENT/SOCIAL WORK
Discharge Planning Assessment  [facility]     Patient Name: Diane Guan  MRN: [id number]  Today's Date: 7/8/2021    Admit Date: 7/2/2021    Discharge Needs Assessment    No documentation.       Discharge Plan     Row Name 07/08/21 0047       Plan    Plan Comments  Call received from patient's nurse Ai stating that patient is ready for dc, but family didn't bring her portable O2 tank.Ai inquring if [employer] can bring a portable tank or if  can obtain one. Ai informed  will look into if [facility] can deliver tank, but patient;s family may have to go home to get tank. I called Asia in customer service at [facility]. She is looking into if a portable tank can be delivered to patient at hospital or not.    Row Name 07/08/21 0165       Plan    Plan  Declined IP rehab and home health. Anticipate routine home with spouse and daughter. Has continuous home O2 through [facility], updated O2 order faxed 7/8.    Plan Comments  Per walking oximetry patient needs 3 L NC, normally wears 2 L NC at home. Called and spoke to [facility] and faxed over new order through ad-hoc. Called and updated patients daughter and she remains agreeable to d/c plan. Discharge orders noted.    Final Discharge Disposition Code  01 - home or self-care    Final Note  Home-- declined HH and IP rehab services        Continued Care and Services - Admitted Since 7/2/2021     Durable Medical Equipment Coordination complete    Service Provider Request Status Selected Services Address Phone Fax Patient Preferred    [facility]   Selected Durable Medical Equipment [address] [facility] [address] [phone] -- --              Kinga Ozuna RN, [facility]  Office: [phone]  Fax: [phone]  [email or web address]    Kinga Ozuna RN

## 2021-07-08 NOTE — CASE MANAGEMENT/SOCIAL WORK
Discharge Planning Assessment  [facility]     Patient Name: Diane Guan  MRN: [id number]  Today's Date: 7/8/2021    Admit Date: 7/2/2021    Discharge Needs Assessment    No documentation.       Discharge Plan     Row Name 07/08/21 1652       Plan    Plan Comments  Asia w/ [facility] said she will stay until 1710 at the office on [facility] in [place]. Patient's family can come by there to  tank.I spoke with patient and daughter Marianna who was in the room and informed them.Marianna said she could do this and left. Asia notified that Marianna is on her way.    Row Name 07/08/21 1637       Plan    Plan Comments  Call received from patient's nurse Ai stating that patient is ready for dc, but family didn't bring her portable O2 tank.Ai inquring if [employer] can bring a portable tank or if  can obtain one. Ai informed  will look into if [employer] can deliver tank, but patient;s family may have to go home to get tank. [facility] She is looking into if a portable tank can be delivered to patient at hospital or not.    Row Name 07/08/21 1439       Plan    Plan  Declined IP rehab and home health. Anticipate routine home with spouse and daughter. Has continuous home O2 through [facility], updated O2 order faxed 7/8.    Plan Comments  Per walking oximetry patient needs 3 L NC, normally wears 2 L NC at home. Called and spoke to Jona Monahan and faxed over new order through [employer]. Called and updated patients daughter and she remains agreeable to d/c plan. Discharge orders noted.    Final Discharge Disposition Code  01 - home or self-care    Final Note  Home-- declined HH and IP rehab services        Continued Care and Services - Admitted Since 7/2/2021     Durable Medical Equipment Coordination complete    Service Provider Request Status Selected Services Address Phone Fax Patient Preferred    [facility]  HEALTH CARE   Selected Durable Medical Equipment [address], [place] [address] [phone] -- --                Kinga Ozuna RN, [facility]  Office: [phone]  Fax: [phone]  [email or web address]      Kinga Ozuna RN

## 2021-07-08 NOTE — CASE MANAGEMENT/SOCIAL WORK
Continued Stay Note   [facility]     Patient Name: Diane Guan  MRN: [id number]  Today's Date: 7/8/2021    Admit Date: 7/2/2021    Discharge Plan     Row Name 07/08/21 1439       Plan    Plan  Declined IP rehab and home health. Anticipate routine home with spouse and daughter. Has continuous home O2 through [facility], updated O2 order faxed 7/8.    Plan Comments  Per walking oximetry patient needs 3 L NC, normally wears 2 L NC at home. Called and spoke to [employer] and faxed over new order through [employer]. Called and updated patients daughter and she remains agreeable to d/c plan. Discharge orders noted.    Final Discharge Disposition Code  01 - home or self-care    Final Note  Home-- declined HH and IP rehab services          Emily Carrasco RN

## 2021-07-08 NOTE — PROGRESS NOTES
"      [facility] Medicine Services Daily Progress Note      Hospitalist Team  LOS 5 days      Patient Care Team:  Jemima Fontaine MD as PCP - General (Family Medicine)    Patient Location: 242/1      Subjective   Subjective     Chief Complaint / Subjective  Chief Complaint   Patient presents with   • Fall     pt fell at home, c/o generalized pain, c/o increased SOA as well         Brief Synopsis of Hospital Course/HPI    The patient is an 81 years old female with history of anxiety, depression, hyperlipidemia, hypertension, RLS, breast cancer s/p right lumpectomy, CKD stage III, 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    Apparently the patient had fallen at home while ambulating with her walker without associated loss of consciousness.  She claimed that her lower extremities gave out because of weakness.      Chest x-ray in the ED suggested right lower lobe pneumonia. SIRS was met with fever of 100.9F and leukocytosis 28.6.      Date::     7/5/21:  at the bedside.  Patient falling a lot at home.  Uses a walker.  Laceration of nose and right forearm.  Has been oxygen since right lumpectomy.  No history of chemo or radiation.  On Zosyn. S/p CT chest w/o showed left-sided pneumonia.  7/6/21: Poor historian.  Claims to be fatigued.  Consulted pulmonary  7/7/21: Complains of intermittent nausea.  Afebrile.  7/8/21: Afebrile.      Review of Systems   All other systems reviewed and are negative.        Objective   Objective      Vital Signs  Temp:  [97.8 °F (36.6 °C)-98.2 °F (36.8 °C)] 98.2 °F (36.8 °C)  Heart Rate:  [68-80] 72  Resp:  [18-24] 22  BP: (157-177)/(73-78) 176/73  Oxygen Therapy  SpO2: 93 %  Pulse Oximetry Type: Intermittent  Device (Oxygen Therapy): high-flow nasal cannula  Flow (L/min): 10  Oxygen Concentration (%): 36  Flowsheet Rows      First Filed Value   Admission Height  157.5 cm (62\") Documented at 07/02/2021 1617   Admission Weight  81.6 kg (180 lb) Documented at " 07/02/2021 1617        Intake & Output (last 3 days)       07/05 0701 - 07/06 0700 07/06 0701 - 07/07 0700 07/07 0701 - 07/08 0700 07/08 0701 - 07/09 0700    P.O. 220 840 600 240    I.V. (mL/kg)  1000 (11.3)      IV Piggyback 100 100      Total Intake(mL/kg) 320 (3.6) 1940 (21.9) 600 (6.7) 240 (2.7)    Urine (mL/kg/hr)   400 (0.2)     Stool   0     Total Output   400     Net +320 +1940 +200 +240            Urine Unmeasured Occurrence 2 x 2 x 1 x     Stool Unmeasured Occurrence 4 x  2 x         Lines, Drains & Airways    Active LDAs     Name:   Placement date:   Placement time:   Site:   Days:    Peripheral IV 07/05/21 1120 Anterior;Left Forearm   07/05/21    1120    Forearm   less than 1                  Physical Exam:    Physical Exam  HENT:      Head: Normocephalic.   Eyes:      Extraocular Movements: Extraocular movements intact.      Pupils: Pupils are equal, round, and reactive to light.   Cardiovascular:      Rate and Rhythm: Normal rate and regular rhythm.   Pulmonary:      Effort: Pulmonary effort is normal.   Abdominal:      General: Bowel sounds are normal.      Palpations: Abdomen is soft.   Musculoskeletal:         General: Normal range of motion.      Cervical back: Normal range of motion.   Skin:     General: Skin is warm.   Neurological:      Mental Status: She is alert and oriented to person, place, and time. Mental status is at baseline.   Psychiatric:         Mood and Affect: Mood normal.         Procedures:      Results Review:     I reviewed the patient's new clinical results.      Lab Results (last 24 hours)     Procedure Component Value Units Date/Time    Respiratory Culture - Sputum, Cough [[id number]] Collected: 07/08/21 0623    Specimen: Sputum from Cough Updated: 07/08/21 0950     Gram Stain Moderate (3+) WBCs per low power field      Few (2+) Epithelial cells per low power field      Few (2+) Mixed bacterial morphotypes seen on Gram Stain    BNP [[id number]]  (Abnormal) Collected: 07/08/21  0532    Specimen: Blood Updated: 07/08/21 0746     proBNP 3,656.0 pg/mL     Narrative:      Among patients with dyspnea, NT-proBNP is highly sensitive for the detection of acute congestive heart failure. In addition NT-proBNP of <300 pg/ml effectively rules out acute congestive heart failure with 99% negative predictive value.    Results may be falsely decreased if patient taking Biotin.      Phosphorus [[id number]]  (Normal) Collected: 07/08/21 0532    Specimen: Blood Updated: 07/08/21 0656     Phosphorus 3.7 mg/dL     Basic Metabolic Panel [[id number]]  (Abnormal) Collected: 07/08/21 0532    Specimen: Blood Updated: 07/08/21 0652     Glucose 101 mg/dL      BUN 15 mg/dL      Creatinine 0.93 mg/dL      Sodium 137 mmol/L      Potassium 4.3 mmol/L      Comment: Slight hemolysis detected by analyzer. Results may be affected.        Chloride 107 mmol/L      CO2 17.0 mmol/L      Calcium 8.6 mg/dL      eGFR Non African Amer 58 mL/min/1.73      BUN/Creatinine Ratio 16.1     Anion Gap 13.0 mmol/L     Narrative:      GFR Normal >60  Chronic Kidney Disease <60  Kidney Failure <15      C-reactive Protein [[id number]]  (Abnormal) Collected: 07/08/21 0532    Specimen: Blood Updated: 07/08/21 0652     C-Reactive Protein 8.32 mg/dL     Manual Differential [[id number]]  (Abnormal) Collected: 07/08/21 0333    Specimen: Blood Updated: 07/08/21 0538     Neutrophil % 32.0 %      Lymphocyte % 58.0 %      Monocyte % 6.0 %      Bands %  4.0 %      Neutrophils Absolute 5.40 10*3/mm3      Lymphocytes Absolute 8.70 10*3/mm3      Monocytes Absolute 0.90 10*3/mm3      Anisocytosis Slight/1+     Smudge Cells Slight/1+     Platelet Estimate Adequate    Narrative:      Reviewed by Pathologist within the past 30 days on 07.06.2021 .     CBC & Differential [[id number]]  (Abnormal) Collected: 07/08/21 0333    Specimen: Blood Updated: 07/08/21 0538    Narrative:      The following orders were created for panel order CBC & Differential.  Procedure                                Abnormality         Status                     ---------                               -----------         ------                     Scan Slide[[id number]]                                       Final result               CBC Auto Differential[[id number]]        Abnormal            Final result                 Please view results for these tests on the individual orders.    Scan Slide [[id number]] Collected: 07/08/21 0333    Specimen: Blood Updated: 07/08/21 0538     Scan Slide --     Comment: See Manual Differential Results       CBC Auto Differential [[id number]]  (Abnormal) Collected: 07/08/21 0333    Specimen: Blood Updated: 07/08/21 0538     WBC 15.00 10*3/mm3      RBC 3.20 10*6/mm3      Hemoglobin 8.7 g/dL      Hematocrit 28.3 %      MCV 88.5 fL      MCH 27.1 pg      MCHC 30.6 g/dL      RDW 16.0 %      RDW-SD 50.3 fl      MPV 9.3 fL      Platelets 254 10*3/mm3     Narrative:      The previously reported component NRBC is no longer being reported. Previous result was 0.1 /100 WBC (Reference Range: 0.0-0.2 /100 WBC) on 7/8/2021 at 0454 EDT.    Blood Culture - Blood, Leg, Left [[id number]] Collected: 07/02/21 1938    Specimen: Blood from Leg, Left Updated: 07/07/21 1945     Blood Culture No growth at 5 days        No results found for: HGBA1C            Lab Results   Component Value Date    LIPASE 12 (L) 07/02/2021     Lab Results   Component Value Date    CHOL 374 (H) 01/03/2020    TRIG 311 (H) 01/03/2020    HDL 50 01/03/2020     (H) 01/03/2020       Lab Results   Lab Value Date/Time    FINALDX  07/02/2021 1828     Absolute atypical lymphocytosis suspicious for CLL/SLL      FINALDX  12/31/2020 0205     Leukocytosis with absolute atypical lymphocytosis  Anemia  No blasts identified  If CBC indices persist/worsen, consider flow cytometry or other studies as clinically indicated to exclude a lymphoproliferative disorder         Microbiology Results (last 10 days)     Procedure Component Value  - Date/Time    Respiratory Culture - Sputum, Cough [[id number]] Collected: 07/08/21 0623    Lab Status: Preliminary result Specimen: Sputum from Cough Updated: 07/08/21 0950     Gram Stain Moderate (3+) WBCs per low power field      Few (2+) Epithelial cells per low power field      Few (2+) Mixed bacterial morphotypes seen on Gram Stain    S. Pneumo Ag Urine or CSF - Urine, Urine, Clean Catch [[id number]]  (Normal) Collected: 07/03/21 1312    Lab Status: Final result Specimen: Urine, Clean Catch Updated: 07/03/21 1441     Strep Pneumo Ag Negative    Legionella Antigen, Urine - Urine, Urine, Clean Catch [[id number]]  (Normal) Collected: 07/03/21 1312    Lab Status: Final result Specimen: Urine, Clean Catch Updated: 07/03/21 1441     LEGIONELLA ANTIGEN, URINE Negative    Respiratory Culture - Sputum, Cough [[id number]] Collected: 07/03/21 1308    Lab Status: Final result Specimen: Sputum from Cough Updated: 07/06/21 0948     Respiratory Culture Moderate growth (3+) Normal Respiratory Leanna: NO S.aureus/MRSA or Pseudomonas aeruginosa     Gram Stain Many (4+) WBCs per low power field      Many (4+) Epithelial cells per low power field      Moderate (3+) Mixed bacterial morphotypes seen on Gram Stain    MRSA Screen, PCR (Inpatient) - Swab, Nares [[id number]]  (Normal) Collected: 07/03/21 1143    Lab Status: Final result Specimen: Swab from Nares Updated: 07/03/21 1542     MRSA PCR No MRSA Detected    Respiratory Panel PCR w/COVID-19(SARS-CoV-2) LESLEE/VIVIANE/PAVAN/PAD/COR/MAD/JUAN DAVID In-House, NP Swab in UTM/VTM, 3-4 HR TAT - Swab, Nasopharynx [[id number]]  (Normal) Collected: 07/02/21 2216    Lab Status: Final result Specimen: Swab from Nasopharynx Updated: 07/02/21 2309     ADENOVIRUS, PCR Not Detected     Coronavirus 229E Not Detected     Coronavirus HKU1 Not Detected     Coronavirus NL63 Not Detected     Coronavirus OC43 Not Detected     COVID19 Not Detected     Human Metapneumovirus Not Detected     Human Rhinovirus/Enterovirus Not  Detected     Influenza A PCR Not Detected     Influenza B PCR Not Detected     Parainfluenza Virus 1 Not Detected     Parainfluenza Virus 2 Not Detected     Parainfluenza Virus 3 Not Detected     Parainfluenza Virus 4 Not Detected     RSV, PCR Not Detected     Bordetella pertussis pcr Not Detected     Bordetella parapertussis PCR Not Detected     Chlamydophila pneumoniae PCR Not Detected     Mycoplasma pneumo by PCR Not Detected    Narrative:      In the setting of a positive respiratory panel with a viral infection PLUS a negative procalcitonin without other underlying concern for bacterial infection, consider observing off antibiotics or discontinuation of antibiotics and continue supportive care. If the respiratory panel is positive for atypical bacterial infection (Bordetella pertussis, Chlamydophila pneumoniae, or Mycoplasma pneumoniae), consider antibiotic de-escalation to target atypical bacterial infection.    Blood Culture - Blood, Leg, Left [[id number]] Collected: 07/02/21 1938    Lab Status: Final result Specimen: Blood from Leg, Left Updated: 07/07/21 1945     Blood Culture No growth at 5 days    Urine Culture - Urine, Urine, Catheter [[id number]]  (Abnormal)  (Susceptibility) Collected: 07/02/21 1921    Lab Status: Final result Specimen: Urine, Catheter Updated: 07/04/21 0132     Urine Culture >100,000 CFU/mL Escherichia coli    Susceptibility      Escherichia coli      JUAN      Ampicillin Susceptible      Ampicillin + Sulbactam Susceptible      Cefazolin Susceptible      Cefepime Susceptible      Ceftazidime Susceptible      Ceftriaxone Susceptible      Gentamicin Susceptible      Levofloxacin Susceptible      Nitrofurantoin Susceptible      Piperacillin + Tazobactam Susceptible      Tetracycline Susceptible      Trimethoprim + Sulfamethoxazole Susceptible               Linear View                   Blood Culture - Blood, Arm, Left [[id number]]  (Abnormal) Collected: 07/02/21 1828    Lab Status: Final  result Specimen: Blood from Arm, Left Updated: 07/05/21 1135     Blood Culture Staphylococcus, coagulase negative     Comment: Probable contaminant requires clinical correlation, susceptibility not performed unless requested by physician.          Isolated from Aerobic Bottle     Gram Stain Aerobic Bottle Gram positive cocci in clusters    Blood Culture ID, PCR - Blood, Arm, Left [[id number]]  (Abnormal) Collected: 07/02/21 1828    Lab Status: Final result Specimen: Blood from Arm, Left Updated: 07/03/21 1855     BCID, PCR Staphylococcus spp, not aureus. Identification by BCID PCR.     BOTTLE TYPE Aerobic Bottle          ECG/EMG Results (most recent)     Procedure Component Value Units Date/Time    ECG 12 Lead [[id number]] Collected: 07/02/21 1637     Updated: 07/04/21 0947     QT Interval 322 ms     Narrative:      HEART RATE= 104  bpm  RR Interval= 580  ms  WI Interval= 123  ms  P Horizontal Axis= -27  deg  P Front Axis= 17  deg  QRSD Interval= 89  ms  QT Interval= 322  ms  QRS Axis= 48  deg  T Wave Axis= 63  deg  - OTHERWISE NORMAL ECG -  Sinus tachycardia  Low voltage, precordial leads  When compared with ECG of 30-Dec-2020 13:14:52,  Significant change in rhythm  Electronically Signed By: Holden Ortiz (Ismeon) 04-Jul-2021 09:33:16  Date and Time of Study: 2021-07-02 16:37:22          Results for orders placed during the hospital encounter of 12/30/20    Duplex Venous Lower Extremity - Bilateral CAR    Interpretation Summary  · Normal bilateral lower extremity venous duplex scan.      Results for orders placed during the hospital encounter of 12/30/20    Adult Transthoracic Echo Complete W/ Cont if Necessary Per Protocol    Interpretation Summary  · Estimated left ventricular EF = 65% Estimated left ventricular EF was in agreement with the calculated left ventricular EF. Left ventricular systolic function is normal.  · There is mainly affecting the non-coronary and left coronary cusp(s).  · The right ventricular  cavity is mildly dilated.  · Estimated right ventricular systolic pressure from tricuspid regurgitation is normal (<35 mmHg).      CT Chest Without Contrast Diagnostic    Result Date: 7/5/2021  1. Left lower lobe consolidation likely representing pneumonia. 2. Retained ingested material within the distal esophagus with small hiatal hernia. This may indicate stasis or reflux.    Electronically Signed By-Antony Lopez MD On:7/5/2021 3:16 PM This report was finalized on [id number] by  Antony Lopez MD.    XR Chest 1 View    Result Date: 7/8/2021   1. Dense retrocardiac left lower lobe airspace disease consistent with the appearance of pneumonia. This corresponds to the CT chest findings from 7/5/2021, and is not thought to be significantly changed. 2. Probable small left pleural effusion without significant change.  Electronically Signed By-Marta Carrillo MD On:7/8/2021 8:09 AM This report was finalized on [id number] by  Marta Carrillo MD.    XR Chest 1 View    Result Date: 7/7/2021   1. Cardiomegaly and chronic changes about this chest. There is no obvious pneumonia or congestive change   Electronically Signed By-Vinicius Cox MD On:7/7/2021 7:12 AM This report was finalized on [id number] by  Vinicius Cox MD.    XR Chest 1 View    Result Date: 7/2/2021  Increased left lower lung airspace opacities, which may be due to atelectasis and/or pneumonia.  Electronically Signed By-Jacquie Arroyo MD On:7/2/2021 5:58 PM This report was finalized on [id number] by  Jacquie Arroyo MD.          Xrays, labs reviewed personally by physician.    Medication Review:   I have reviewed the patient's current medication list      Scheduled Meds  amLODIPine, 10 mg, Oral, Daily  anastrozole, 1 mg, Oral, Daily  arformoterol, 15 mcg, Nebulization, BID - RT  budesonide, 0.5 mg, Nebulization, BID - RT  cetirizine, 10 mg, Oral, Nightly  chlorthalidone, 25 mg, Oral, Daily  docusate sodium, 100 mg, Oral, BID  enoxaparin,  40 mg, Subcutaneous, Daily  ferrous sulfate, 324 mg, Oral, Once per day on Mon Wed Fri  guaiFENesin, 1,200 mg, Oral, Q12H  ipratropium-albuterol, 3 mL, Nebulization, Q4H - RT  levothyroxine, 100 mcg, Oral, Q AM  losartan, 100 mg, Oral, Daily  mirtazapine, 15 mg, Oral, Nightly  pantoprazole, 40 mg, Oral, QAM AC  piperacillin-tazobactam, 3.375 g, Intravenous, Q8H  rOPINIRole, 0.25 mg, Oral, Nightly  rosuvastatin, 10 mg, Oral, Nightly  sertraline, 150 mg, Oral, Nightly  sodium chloride, 3 mL, Intravenous, Q12H  vitamin D3, 5,000 Units, Oral, Daily        Meds Infusions  sodium chloride, 50 mL/hr, Last Rate: 50 mL/hr (07/07/21 0211)        Meds PRN  •  acetaminophen **OR** acetaminophen **OR** acetaminophen  •  benzonatate  •  melatonin  •  metoclopramide  •  nitroglycerin  •  ondansetron **OR** ondansetron  •  oxyCODONE  •  [COMPLETED] Insert peripheral IV **AND** sodium chloride  •  sodium chloride        Assessment/Plan   Assessment/Plan     Active Hospital Problems    Diagnosis  POA   • Acute UTI (urinary tract infection) [N39.0]  Yes   • Leukocytosis [D72.829]  Yes   • CKD (chronic kidney disease) stage 3, GFR 30-59 ml/min (CMS/HCC) [N18.30]  Yes   • Pneumonia of left lung due to infectious organism [J18.9]  Yes      Resolved Hospital Problems   No resolved problems to display.       MEDICAL DECISION MAKING COMPLEXITY BY PROBLEM:     Sepsis secondary to LLL pneumonia:  -Rocephin and doxycycline discontinued  -Continue Zosyn  -CT chest 7/5/2021--> pneumonia  -Consulted pulmonology 7/6/2021      Blood culture 1/2 on 7/2/2021:  -Suspected contamination  -Grew coagulase-negative staph    Acute on chronic hypoxemic respiratory failure due to pneumonia:  -Titrate pulse ox> 92%    Acute UTI d/t E coli - POA  - on antibiotics as above      Generalized weakness with falls:  -Likely secondary to acute illness/infection  -consulted PT/OT--> recommended inpatient rehab      Acute Kidney Injury on Chronic kidney disease stage  III:  - baseline Cr 1-1.3  - continue IV fluids      Hypocalcemia: Repleted  - replace IV 7/3/21     Obesity  -  on lifestyle changes as appropriate      Chronic HFpEF:  - stable, not in exacerbation  -TTE 1/6/2021 --> LVEF 65%      Hypothyroidism  - on home levothyroxine     Anxiety/depression: Controlled  - on home Remeron, Zoloft     Hyperlipidemia:  -Continue statin        New onset diabetes Mellitus type 2:  - Hgb A1c 6.6  - add consistent carb diet  - consult diabetes educator      Normocytic anemia:  - hemoglobin 10.5      Essential hypertension  - controlled on home Norvasc, chlorthalidone, losartan     GERD  - on PPI and Reglan     Restless leg syndrome  - on ropinirole     History breast cancer s/p right lumpectomy:  - on anastrozole     allergic rhinitis: Controlled  - on cetirizine     Chronic constipation:  -Continue stool softeners        VTE Prophylaxis -   Mechanical Order History:     None      Pharmalogical Order History:      Ordered     Dose Route Frequency Stop    07/02/21 2010  enoxaparin (LOVENOX) syringe 40 mg      40 mg SC Daily --                  Code Status -   Code Status and Medical Interventions:   Ordered at: 07/02/21 2010     Code Status:    CPR     Medical Interventions (Level of Support Prior to Arrest):    Full       This patient has been examined wearing appropriate Personal Protective Equipment and discussed with nursing. 07/08/21        Discharge Planning  defer        Electronically signed by Martinez Hitchcock DO, 07/08/21, 12:58 EDT.  [facility] Hospitalist Team

## 2021-07-08 NOTE — DISCHARGE SUMMARY
[facility] Medicine Services  DISCHARGE SUMMARY        Prepared For PCP:  Jemima Fontaine MD    Patient Name: Diane Guan  : 1940  MRN: [id number]      Date of Admission:   2021    Date of Discharge:  2021    Length of stay:  LOS: 5 days     Hospital Course     Presenting Problem:   General weakness [R53.1]  Fall, initial encounter [W19.XXXA]  Pneumonia of left lung due to infectious organism, unspecified part of lung [J18.9]      Active Hospital Problems    Diagnosis  POA   • Sepsis due to pneumonia (CMS/[facility]) [J18.9, A41.9]  Yes     Priority: High   • Pneumonia of left lung due to infectious organism [J18.9]  Yes     Priority: High   • Breast cancer (CMS/[facility]) [C50.919]  Yes     Priority: Medium   • Acute on chronic respiratory failure with hypoxemia (CMS/[facility]) [J96.21]  Yes   • CKD (chronic kidney disease) stage 3, GFR 30-59 ml/min (CMS/[facility]) [N18.30]  Yes   • Chronic respiratory failure with hypoxia, on home O2 therapy (CMS/[facility]) [J96.11, Z99.81]  Not Applicable   • LIBBY (obstructive sleep apnea) [G47.33]  Yes   • Depression [F32.9]  Yes      Resolved Hospital Problems    Diagnosis Date Resolved POA   • Acute UTI (urinary tract infection) [N39.0] 2021 Yes     Priority: High   • Leukocytosis [D72.829] 2021 Yes     Priority: High           Hospital Course:    The patient was admitted to the medical floor for acute on chronic hypoxemic respiratory failure secondary to left lower lobe pneumonia.  CT chest w/o contrast 2021 showed left-sided pneumonia.  Pulmonology was consulted on 2021.  She has been treated with Zosyn during the hospitalization.  Urine culture grew E. coli.  She will be discharged home on Augmentin.  The patient was offered inpatient rehab and home health but has declined.  1 bottle blood culture from admission date 2021 grew coagulase-negative Staphylococcus likely contamination.      Problem list addressed during the  hospitalization      Sepsis secondary to LLL pneumonia:  -Rocephin and doxycycline discontinued  -Treated with Zosyn during the hospitalization  -CT chest 7/5/2021--> pneumonia  -Consulted pulmonology 7/6/2021  -Discharged home on Augmentin        Blood culture 1/2 on 7/2/2021:  -Likely from contamination  -Grew coagulase-negative staph     Acute on chronic hypoxemic respiratory failure due to pneumonia:  -Titrate pulse ox> 92%     Acute UTI d/t E coli - POA  -Treated with Zosyn.     Generalized weakness with falls:  -Likely secondary to acute illness/infection  -consulted PT/OT--> recommended inpatient rehab but the patient declined.  Also declined home health.        Acute Kidney Injury on Chronic kidney disease stage III: Resolved  - baseline Cr 1-1.3  -Treated with IVF      Hypocalcemia: Repleted     Obesity  -  on lifestyle changes as appropriate      Chronic HFpEF:  - stable, not in exacerbation  -TTE 1/6/2021 --> LVEF 65%      Hypothyroidism  -Continue levothyroxine     Anxiety/depression: Controlled  - on home Remeron, Zoloft     Hyperlipidemia:  -Continue statin        New onset diabetes Mellitus type 2:  - Hgb A1c 6.6  -Discharged on Metformin      Normocytic anemia:  - hemoglobin 10.5      Essential hypertension  - controlled on home Norvasc, chlorthalidone, losartan     GERD  - on PPI and Reglan     Restless leg syndrome  - on ropinirole     History breast cancer s/p right lumpectomy:  - on anastrozole     allergic rhinitis: Controlled  - on cetirizine     Chronic constipation:  -Continue stool softeners          Recommendation for Outpatient Providers:             Reasons For Change In Medications and Indications for New Medications:        Day of Discharge     HPI:     The patient is an 81 years old female with history of anxiety, depression, hyperlipidemia, hypertension, RLS, breast cancer s/p right lumpectomy, CKD stage III, 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     Apparently the patient had  fallen at home while ambulating with her walker without associated loss of consciousness.  She claimed that her lower extremities gave out because of weakness.       Chest x-ray in the ED suggested left lower lobe pneumonia. SIRS was met with fever of 100.9F and leukocytosis 28.6.        Vital Signs:   Temp:  [97.8 °F (36.6 °C)-98.2 °F (36.8 °C)] 98.2 °F (36.8 °C)  Heart Rate:  [72-80] 78  Resp:  [18-24] 20  BP: (155-177)/(70-78) 155/70     Physical Exam:  Physical Exam  HENT:      Head: Normocephalic.      Nose: Nose normal.   Eyes:      Extraocular Movements: Extraocular movements intact.      Conjunctiva/sclera: Conjunctivae normal.   Cardiovascular:      Rate and Rhythm: Normal rate.   Pulmonary:      Effort: Pulmonary effort is normal.   Abdominal:      General: Bowel sounds are normal.   Musculoskeletal:         General: Normal range of motion.      Cervical back: Normal range of motion.   Skin:     General: Skin is warm.   Neurological:      Mental Status: She is alert and oriented to person, place, and time. Mental status is at baseline.   Psychiatric:         Mood and Affect: Mood normal.         Pertinent  and/or Most Recent Results     Results from last 7 days   Lab Units 07/08/21  0532 07/08/21  0333 07/07/21  0323 07/06/21  0848 07/06/21  0833 07/05/21  0339 07/04/21  0350 07/03/21  0449 07/02/21  1828   WBC 10*3/mm3  --  15.00* 15.50* 10.30  --  18.30* 20.90* 28.70* 28.60*   HEMOGLOBIN g/dL  --  8.7* 8.5* 9.6*  --  9.0* 8.8* 9.9* 10.5*   HEMATOCRIT %  --  28.3* 27.1* 30.6*  --  28.0* 27.5* 31.3* 32.9*   PLATELETS 10*3/mm3  --  254 237 218  --  179 163 168 161   SODIUM mmol/L 137  --  140  --  141 138 136 135* 133*   POTASSIUM mmol/L 4.3  --  4.4  --  4.2 3.7 3.5 3.4* 3.7   CHLORIDE mmol/L 107  --  108*  --  108* 106 103 100 100   CO2 mmol/L 17.0*  --  21.0*  --  20.0* 17.0* 20.0* 20.0* 19.0*   BUN mg/dL 15  --  25*  --  30* 35* 36* 31* 30*   CREATININE mg/dL 0.93  --  1.19*  --  1.34* 1.48* 1.75* 1.39*  1.36*   GLUCOSE mg/dL 101*  --  113*  --  114* 131* 140* 141* 162*   CALCIUM mg/dL 8.6  --  8.5*  --  9.2 8.2* 8.3* 8.1* 8.2*     Results from last 7 days   Lab Units 07/02/21  1828   BILIRUBIN mg/dL 0.5   ALK PHOS U/L 109   ALT (SGPT) U/L 12   AST (SGOT) U/L 28           Invalid input(s): TG, LDLCALC, LDLREALC  Results from last 7 days   Lab Units 07/08/21  0532 07/06/21  0833 07/04/21  0350 07/03/21  0449 07/02/21  1832 07/02/21  1828   HEMOGLOBIN A1C %  --   --  6.6*  --   --   --    PROBNP pg/mL 3,656.0*  --   --   --   --  1,231.0   TROPONIN T ng/mL  --   --   --   --   --  <0.010   PROCALCITONIN ng/mL  --  0.43*  --   --   --   --    LACTATE mmol/L  --   --   --  1.2 0.6  --        Brief Urine Lab Results  (Last result in the past 365 days)      Color   Clarity   Blood   Leuk Est   Nitrite   Protein   CREAT   Urine HCG        07/02/21 1921 Yellow Clear Small (1+) Small (1+) Negative 100 mg/dL (2+)               Microbiology Results Abnormal     Procedure Component Value - Date/Time    Respiratory Culture - Sputum, Cough [[id number]] Collected: 07/08/21 0623    Lab Status: Preliminary result Specimen: Sputum from Cough Updated: 07/08/21 0950     Gram Stain Moderate (3+) WBCs per low power field      Few (2+) Epithelial cells per low power field      Few (2+) Mixed bacterial morphotypes seen on Gram Stain    Blood Culture - Blood, Leg, Left [[id number]] Collected: 07/02/21 1938    Lab Status: Final result Specimen: Blood from Leg, Left Updated: 07/07/21 1945     Blood Culture No growth at 5 days    Respiratory Culture - Sputum, Cough [[id number]] Collected: 07/03/21 1308    Lab Status: Final result Specimen: Sputum from Cough Updated: 07/06/21 0948     Respiratory Culture Moderate growth (3+) Normal Respiratory Leanna: NO S.aureus/MRSA or Pseudomonas aeruginosa     Gram Stain Many (4+) WBCs per low power field      Many (4+) Epithelial cells per low power field      Moderate (3+) Mixed bacterial morphotypes seen on  Gram Stain    Blood Culture - Blood, Arm, Left [[id number]]  (Abnormal) Collected: 07/02/21 1828    Lab Status: Final result Specimen: Blood from Arm, Left Updated: 07/05/21 1135     Blood Culture Staphylococcus, coagulase negative     Comment: Probable contaminant requires clinical correlation, susceptibility not performed unless requested by physician.          Isolated from Aerobic Bottle     Gram Stain Aerobic Bottle Gram positive cocci in clusters    Urine Culture - Urine, Urine, Catheter [[id number]]  (Abnormal)  (Susceptibility) Collected: 07/02/21 1921    Lab Status: Final result Specimen: Urine, Catheter Updated: 07/04/21 0132     Urine Culture >100,000 CFU/mL Escherichia coli    Susceptibility      Escherichia coli      JUAN      Ampicillin Susceptible      Ampicillin + Sulbactam Susceptible      Cefazolin Susceptible      Cefepime Susceptible      Ceftazidime Susceptible      Ceftriaxone Susceptible      Gentamicin Susceptible      Levofloxacin Susceptible      Nitrofurantoin Susceptible      Piperacillin + Tazobactam Susceptible      Tetracycline Susceptible      Trimethoprim + Sulfamethoxazole Susceptible               Linear View                   Blood Culture ID, PCR - Blood, Arm, Left [[id number]]  (Abnormal) Collected: 07/02/21 1828    Lab Status: Final result Specimen: Blood from Arm, Left Updated: 07/03/21 1855     BCID, PCR Staphylococcus spp, not aureus. Identification by BCID PCR.     BOTTLE TYPE Aerobic Bottle    MRSA Screen, PCR (Inpatient) - Swab, Nares [[id number]]  (Normal) Collected: 07/03/21 1143    Lab Status: Final result Specimen: Swab from Nares Updated: 07/03/21 1542     MRSA PCR No MRSA Detected    Legionella Antigen, Urine - Urine, Urine, Clean Catch [[id number]]  (Normal) Collected: 07/03/21 1312    Lab Status: Final result Specimen: Urine, Clean Catch Updated: 07/03/21 1441     LEGIONELLA ANTIGEN, URINE Negative    S. Pneumo Ag Urine or CSF - Urine, Urine, Clean Catch [[id number]]   (Normal) Collected: 07/03/21 1312    Lab Status: Final result Specimen: Urine, Clean Catch Updated: 07/03/21 1441     Strep Pneumo Ag Negative    Respiratory Panel PCR w/COVID-19(SARS-CoV-2) LESLEE/VIVIANE/PAVAN/PAD/COR/MAD/JUAN DAVID In-House, NP Swab in UTM/VTM, 3-4 HR TAT - Swab, Nasopharynx [[id number]]  (Normal) Collected: 07/02/21 2216    Lab Status: Final result Specimen: Swab from Nasopharynx Updated: 07/02/21 2309     ADENOVIRUS, PCR Not Detected     Coronavirus 229E Not Detected     Coronavirus HKU1 Not Detected     Coronavirus NL63 Not Detected     Coronavirus OC43 Not Detected     COVID19 Not Detected     Human Metapneumovirus Not Detected     Human Rhinovirus/Enterovirus Not Detected     Influenza A PCR Not Detected     Influenza B PCR Not Detected     Parainfluenza Virus 1 Not Detected     Parainfluenza Virus 2 Not Detected     Parainfluenza Virus 3 Not Detected     Parainfluenza Virus 4 Not Detected     RSV, PCR Not Detected     Bordetella pertussis pcr Not Detected     Bordetella parapertussis PCR Not Detected     Chlamydophila pneumoniae PCR Not Detected     Mycoplasma pneumo by PCR Not Detected    Narrative:      In the setting of a positive respiratory panel with a viral infection PLUS a negative procalcitonin without other underlying concern for bacterial infection, consider observing off antibiotics or discontinuation of antibiotics and continue supportive care. If the respiratory panel is positive for atypical bacterial infection (Bordetella pertussis, Chlamydophila pneumoniae, or Mycoplasma pneumoniae), consider antibiotic de-escalation to target atypical bacterial infection.          CT Chest Without Contrast Diagnostic    Result Date: 7/5/2021  Impression: 1. Left lower lobe consolidation likely representing pneumonia. 2. Retained ingested material within the distal esophagus with small hiatal hernia. This may indicate stasis or reflux.    Electronically Signed By-Antony Lopez MD On:7/5/2021 3:16 PM This  report was finalized on [id number] by  Antony Lopez MD.    XR Chest 1 View    Result Date: 7/8/2021  Impression:  1. Dense retrocardiac left lower lobe airspace disease consistent with the appearance of pneumonia. This corresponds to the CT chest findings from 7/5/2021, and is not thought to be significantly changed. 2. Probable small left pleural effusion without significant change.  Electronically Signed By-Marta Carrillo MD On:7/8/2021 8:09 AM This report was finalized on [id number] by  Marta Carrillo MD.    XR Chest 1 View    Result Date: 7/7/2021  Impression:  1. Cardiomegaly and chronic changes about this chest. There is no obvious pneumonia or congestive change   Electronically Signed By-Vinicius Cox MD On:7/7/2021 7:12 AM This report was finalized on [id number] by  Vinicius Cox MD.    XR Chest 1 View    Result Date: 7/2/2021  Impression: Increased left lower lung airspace opacities, which may be due to atelectasis and/or pneumonia.  Electronically Signed By-Jacquie Arroyo MD On:7/2/2021 5:58 PM This report was finalized on [id number] by  Jacquie Arroyo MD.      Results for orders placed during the hospital encounter of 12/30/20    Duplex Venous Lower Extremity - Bilateral CAR    Interpretation Summary  · Normal bilateral lower extremity venous duplex scan.      Results for orders placed during the hospital encounter of 12/30/20    Duplex Venous Lower Extremity - Bilateral CAR    Interpretation Summary  · Normal bilateral lower extremity venous duplex scan.      Results for orders placed during the hospital encounter of 12/30/20    Adult Transthoracic Echo Complete W/ Cont if Necessary Per Protocol    Interpretation Summary  · Estimated left ventricular EF = 65% Estimated left ventricular EF was in agreement with the calculated left ventricular EF. Left ventricular systolic function is normal.  · There is mainly affecting the non-coronary and left coronary cusp(s).  · The right  ventricular cavity is mildly dilated.  · Estimated right ventricular systolic pressure from tricuspid regurgitation is normal (<35 mmHg).              Test Results Pending at Discharge  Pending Labs     Order Current Status    Respiratory Culture - Sputum, Cough Preliminary result            Procedures Performed           Consults:   Consults     Date and Time Order Name Status Description    7/6/2021 10:29 AM Inpatient Pulmonology Consult Completed     7/2/2021  7:26 PM Hospitalist (on-call MD unless specified) Completed             Discharge Details        Discharge Medications      New Medications      Instructions Start Date   Accu-Chek Guide test strip  Generic drug: glucose blood   1 each, Other, Daily, Use as instructed      Accu-Chek Softclix Lancets lancets   1 each, Other, Daily, Use as instructed      Alcohol Wipes 70 % misc   1 each, Apply externally, Daily      amoxicillin-clavulanate 875-125 MG per tablet  Commonly known as: Augmentin   1 tablet, Oral, 2 Times Daily      bumetanide 1 MG tablet  Commonly known as: Bumex   1 mg, Oral, Daily      guaiFENesin 600 MG 12 hr tablet  Commonly known as: MUCINEX   1,200 mg, Oral, Every 12 Hours Scheduled      metFORMIN 500 MG tablet  Commonly known as: GLUCOPHAGE   500 mg, Oral, 2 Times Daily With Meals         Continue These Medications      Instructions Start Date   amLODIPine 10 MG tablet  Commonly known as: NORVASC   10 mg, Oral, Daily      anastrozole 1 MG tablet  Commonly known as: ARIMIDEX   TAKE 1 TABLET BY MOUTH EVERY DAY      budesonide-formoterol 160-4.5 MCG/ACT inhaler  Commonly known as: SYMBICORT   2 puffs, Inhalation, 2 Times Daily - RT, Rinse mouth after each use.      cetirizine 10 MG tablet  Commonly known as: zyrTEC   10 mg, Oral, Every Night at Bedtime      chlorthalidone 25 MG tablet  Commonly known as: HYGROTON   TAKE 1 TABLET BY MOUTH EVERY DAY      cholecalciferol 1.25 MG (99289 UT) capsule  Commonly known as: VITAMIN D3   50,000 Units,  Oral, Every 7 Days, Sunday       docusate sodium 100 MG capsule  Commonly known as: COLACE   100 mg, Oral, 2 Times Daily      ferrous sulfate 324 (65 Fe) MG tablet delayed-release EC tablet   324 mg, Oral, 3 Times Weekly      ipratropium-albuterol 0.5-2.5 mg/3 ml nebulizer  Commonly known as: DUO-NEB   3 mL, Nebulization, Every 4 Hours PRN      levothyroxine 100 MCG tablet  Commonly known as: SYNTHROID, LEVOTHROID   100 mcg, Oral, Daily      losartan 100 MG tablet  Commonly known as: COZAAR   TAKE 1 TABLET BY MOUTH EVERY DAY      metoclopramide 5 MG tablet  Commonly known as: REGLAN   5 mg, Oral, 3 Times Daily PRN      mirtazapine 15 MG tablet  Commonly known as: REMERON   15 mg, Oral, Nightly      pantoprazole 40 MG EC tablet  Commonly known as: PROTONIX   40 mg, Oral, Every Morning Before Breakfast, Take on an empty stomach!      rOPINIRole 0.25 MG tablet  Commonly known as: REQUIP   0.25 mg, Oral, Nightly, Take 1 hour before bedtime.      rosuvastatin 10 MG tablet  Commonly known as: CRESTOR   10 mg, Oral, Nightly      sertraline 100 MG tablet  Commonly known as: ZOLOFT   150 mg, Oral, Every Night at Bedtime             No Known Allergies      Discharge Disposition:  Home or Self Care    Diet:  Hospital:  Diet Order   Procedures   • Diet Diabetic/Consistent Carbs; Diabetic - Consistent Carb         Discharge Activity: as tolerated        CODE STATUS:    Code Status and Medical Interventions:   Ordered at: 07/02/21 2010     Code Status:    CPR     Medical Interventions (Level of Support Prior to Arrest):    Full         Follow-up Appointments  Future Appointments   Date Time Provider Department Center   9/10/2021  2:00 PM Jemima Fontaine MD [facility]       Additional Instructions for the Follow-ups that You Need to Schedule     Discharge Follow-up with PCP   As directed       Currently Documented PCP:    Jemima Fontaine MD    PCP Phone Number:    [phone]     Follow Up Details: 2 weeks                  Condition on Discharge:      Stable      This patient has been examined wearing appropriate Personal Protective Equipment and discussed with nursing. 07/08/21      Electronically signed by Martinez Hitchcock DO, 07/08/21, 1:03 PM EDT.      Time: I spent  20  minutes on this discharge activity which included face-to-face encounter with the patient/reviewing the data in the system/coordination of the care with the nursing staff as well as consultants/documentation/entering orders.

## 2021-07-08 NOTE — PROGRESS NOTES
Exercise Oximetry    Patient Name:Diane Guan   MRN: [id number]   Date: 07/08/21             ROOM AIR BASELINE   SpO2% 87 at rest     Heart Rate 88   Blood Pressure      EXERCISE ON ROOM AIR SpO2% EXERCISE ON O2 @ 3 LPM SpO2%   1 MINUTE 87 1 MINUTE 87   2 MINUTES  2 MINUTES 90 on 2 liters, increased to 3 liters.     3 MINUTES 90 3L   4 MINUTES  4 MINUTES 90 3 L   5 MINUTES  5 MINUTES 91 3L   6 MINUTES  6 MINUTES 93 3L              Distance Walked   Distance Walked   Dyspnea (Chelsea Scale)   Dyspnea (Chelsea Scale)   Fatigue (Chelsea Scale)   Fatigue (Chelsea Scale)   SpO2% Post Exercise  93 SpO2% Post Exercise   HR Post Exercise  74 HR Post Exercise   Time to Recovery  5 Time to Recovery     Comments: Patient was on 10 liter high flow cannula, turned 02 0ff and patient dropped to 87% in 1 minute.  Tried patient on 2 liters, had to increase to 3 liters to maintain sats.  Patient has 02 at home with [facility] and uses 2 liters at home.

## 2021-07-08 NOTE — NURSING NOTE
Call from lab, spoke with Brett, states blood drawn earlier this a.m. hemolyzed, lab will be up to redraw this a.m.

## 2021-07-08 NOTE — DISCHARGE PLACEMENT REQUEST
"New home oxygen order    Emily FAITH RN  # [phone]    Diane Apple (81 y.o. Female)     Date of Birth Social Security Number Address Home Phone MRN    1940  [address] [address] [address]  [place] [facility] [phone] [id number]    Caodaism Marital Status          Non-Confucianism        Admission Date Admission Type Admitting Provider Attending Provider Department, Room/Bed    21 Emergency Violet Fletcher, Martinez Arreaga DO [facility] 2C MEDICAL INPATIENT,     Discharge Date Discharge Disposition Discharge Destination         Home or Self Care              Attending Provider: Martinez Hitchcock DO    Allergies: No Known Allergies    Isolation: None   Infection: None   Code Status: CPR    Ht: 157.5 cm (62\")   Wt: 89.5 kg (197 lb 5 oz)    Admission Cmt: None   Principal Problem: None                Active Insurance as of 2021     Primary Coverage     Payor Plan Insurance Group Employer/Plan Group    HUMANA MEDICARE REPLACEMENT HUMANA MEDICARE REPLACEMENT [id number]     Payor Plan Address Payor Plan Phone Number Payor Plan Fax Number Effective Dates    [address] [address] [phone]  2021 - None Entered    [facility], [facility] [address]       Subscriber Name Subscriber Birth Date Member ID       DIANE APPLE 1940 [id number]                 Emergency Contacts      (Rel.) Home Phone Work Phone Mobile Phone    CHERYL SLAUGHTER (Daughter) [phone] -- --    KOFI APPLE (Spouse) -- -- [phone]          [facility] 2C MEDICAL INPATIENT  [address] [facility] [place] [address]  Dept. Phone:  [phone]  Dept. Fax:  [phone] Date Ordered: 2021         Patient:  Diane Apple MRN:  [id number]   [address] [address] [address]  [place] [address] :  1940  SSN:    Phone: [phone] Sex:  F     Weight: 89.5 kg (197 lb 5 oz)         Ht Readings from Last 1 Encounters:   21 157.5 cm (62\")    "      Oxygen Therapy         (Order ID: [id number])    Diagnosis:  Hypoxia (R09.02 [ICD-10-CM] 799.02 [ICD-9-CM])   Quantity:  1     Delivery Modality: Nasal Cannula  Liters Per Minute: 3  Duration: Continuous  Equipment:  Oxygen Concentrator &  &  Portable Gaseous Oxygen System & Portable Oxygen Contents Gaseous &  Conserving Regulator  Length of Need (99 Months = Lifetime): 99 Months = Lifetime        Authorizing Provider's Phone: [phone]   Verbal Order Mode: Telephone with readback   Authorizing Provider: Martinez Hitchcock DO  Authorizing Provider's NPI: [id number]     Order Entered By: Emily Carrasco RN 7/8/2021  2:28 PM     Electronically signed by:          Zarina Niño, RRT   Respiratory Therapist   Respiratory Therapy   Progress Notes       Signed   Date of Service:  07/08/21 1413   Creation Time:  07/08/21 1413            Signed             Show:Clear all  [x]Manual[x]Template[]Copied    Added by:  [x]Zarina Niño, JAMIR    []David for details  Exercise Oximetry     Patient Name:Diane Guan   MRN: [id number]   Date: 07/08/21                                                                                                     ROOM AIR BASELINE   SpO2% 87 at rest      Heart Rate 88   Blood Pressure       EXERCISE ON ROOM AIR SpO2% EXERCISE ON O2 @ 3 LPM SpO2%   1 MINUTE 87 1 MINUTE 87   2 MINUTES   2 MINUTES 90 on 2 liters, increased to 3 liters.       3 MINUTES 90 3L   4 MINUTES   4 MINUTES 90 3 L   5 MINUTES   5 MINUTES 91 3L   6 MINUTES   6 MINUTES 93 3L                                                                                                                 Distance Walked   Distance Walked   Dyspnea (Chelsea Scale)   Dyspnea (Chelsea Scale)   Fatigue (Chelsea Scale)   Fatigue (Chelsea Scale)   SpO2% Post Exercise  93 SpO2% Post Exercise   HR Post Exercise  74 HR Post Exercise   Time to Recovery  5 Time to Recovery      Comments: Patient was on 10 liter high flow cannula,  turned 02 0ff and patient dropped to 87% in 1 minute.  Tried patient on 2 liters, had to increase to 3 liters to maintain sats.  Patient has 02 at home with [employer] [facility] and uses 2 liters at home.

## 2021-07-09 ENCOUNTER — TRANSITIONAL CARE MANAGEMENT TELEPHONE ENCOUNTER (OUTPATIENT)
Dept: CALL CENTER | Facility: HOSPITAL | Age: 81
End: 2021-07-09

## 2021-07-09 NOTE — OUTREACH NOTE
Call Center TCM Note      Responses   [facility] patient discharged from?  [facility]   Does the patient have one of the following disease processes/diagnoses(primary or secondary)?  COPD/Pneumonia   TCM attempt successful?  Yes   Call start time  1245   Call end time  1253   Is patient permission given to speak with other caregiver?  Yes   List who call center can speak with  Marianna Mantilla, daughter, and Doron Guan spouse   Person spoke with today (if not patient) and relationship  massimo Cabral   Meds reviewed with patient/caregiver?  Yes   Is the patient having any side effects they believe may be caused by any medication additions or changes?  No   Does the patient have all medications ordered at discharge?  Yes   Is the patient taking all medications as directed (includes completed medication regime)?  Yes   Does the patient have a primary care provider?   Yes   Does the patient have an appointment with their PCP or specialist within 7 days of discharge?  Greater than 7 days   Comments regarding PCP  PCP Dr Fontaine. Hospital follow up scheduled for 2 weeks 7/22/21  9am   What is preventing the patient from scheduling follow up appointments within 7 days of discharge?  Earlier appointment not available   Nursing Interventions  Verified appointment date/time/provider [Scheduled needed TCM appt. ]   Has the patient kept scheduled appointments due by today?  N/A   What is the Home health agency?   Declined HH.    Has home health visited the patient within 72 hours of discharge?  N/A   DME comments  Patient wearing home O23L continuous.    Pulse Ox monitoring  Intermittent   Pulse Ox device source  Patient   O2 Sat comments  Daughter reports last sat check was 93% with wearing O2.    O2 Sat: education provided  Sat levels, Monitoring frequency, When to seek care   Psychosocial issues?  No   Did the patient receive a copy of their discharge instructions?  Yes   Nursing interventions  Reviewed instructions  with patient   What is the patient's perception of their health status since discharge?  Improving   If the patient is a current smoker, are they able to teach back resources for cessation?  Not a smoker   Is the patient/caregiver able to teach back the hierarchy of who to call/visit for symptoms/problems? PCP, Specialist, Home health nurse, Urgent Care, ED, 911  Yes   Is the patient/caregiver able to teach back signs and symptoms of worsening condition:  Fever/chills, Shortness of breath, Chest pain   Is the patient/caregiver able to teach back importance of completing antibiotic course of treatment?  Yes   TCM call completed?  Yes   Wrap up additional comments  Denies further questions or needs today.           Kate Dye RN    7/9/2021, 12:54 EDT

## 2021-07-09 NOTE — OUTREACH NOTE
Prep Survey      Responses   [facility] from?  [facility]   Is LACE score < 7 ?  No   Emergency Room discharge w/ pulse ox?  No   Eligibility  TCM   Hospital  [facility]   Date of Admission  07/02/21   Date of Discharge  07/08/21   Discharge Disposition  Home or Self Care   Discharge diagnosis  PNA/Fall   Does the patient have one of the following disease processes/diagnoses(primary or secondary)?  COPD/Pneumonia   Does the patient have Home health ordered?  No   Is there a DME ordered?  No   Prep survey completed?  Yes          Fiordaliza Gibbs RN

## 2021-07-10 LAB
BACTERIA SPEC RESP CULT: NORMAL
GRAM STN SPEC: NORMAL

## 2021-07-19 ENCOUNTER — READMISSION MANAGEMENT (OUTPATIENT)
Dept: CALL CENTER | Facility: HOSPITAL | Age: 81
End: 2021-07-19

## 2021-07-19 NOTE — OUTREACH NOTE
COPD/PN Week 2 Survey      Responses   [facility] patient discharged from?  [facility]   Does the patient have one of the following disease processes/diagnoses(primary or secondary)?  COPD/Pneumonia   Was the primary reason for admission:  Pneumonia   Week 2 attempt successful?  Yes   Call start time  1657   Call end time  1659   Discharge diagnosis  PNA/Fall   Is patient permission given to speak with other caregiver?  Yes   List who call center can speak with  Marianna Mantilla, daughter, and Doron Guan spouse   Person spoke with today (if not patient) and relationship  Marianna daughter   Meds reviewed with patient/caregiver?  Yes   Is the patient having any side effects they believe may be caused by any medication additions or changes?  No   Does the patient have all medications ordered at discharge?  Yes   Is the patient taking all medications as directed (includes completed medication regime)?  Yes   Does the patient have a primary care provider?   Yes   Does the patient have an appointment with their PCP or specialist within 7 days of discharge?  Greater than 7 days   Comments regarding PCP  PCP Dr Fontaine. Hospital follow up scheduled for 07/22   What is preventing the patient from scheduling follow up appointments within 7 days of discharge?  Earlier appointment not available   Has the patient kept scheduled appointments due by today?  Yes   DME comments  Patient wearing home O23L continuous.    Pulse Ox monitoring  Intermittent   Pulse Ox device source  Patient   O2 Sat comments  Daughter reports last sat check was 93% with wearing O2.    O2 Sat: education provided  Sat levels, Monitoring frequency, When to seek care   Psychosocial issues?  No   Did the patient receive a copy of their discharge instructions?  Yes   Nursing interventions  Reviewed instructions with patient   What is the patient's perception of their health status since discharge?  Improving   If the patient is a current smoker, are they  able to teach back resources for cessation?  Not a smoker   Is the patient/caregiver able to teach back the hierarchy of who to call/visit for symptoms/problems? PCP, Specialist, Home health nurse, Urgent Care, ED, 911  Yes   Is the patient/caregiver able to teach back signs and symptoms of worsening condition:  Fever/chills, Shortness of breath   Is the patient/caregiver able to teach back importance of completing antibiotic course of treatment?  Yes   Week 2 call completed?  Yes   Wrap up additional comments  Denies further questions or needs today.           Fiordaliza Gibbs RN

## 2021-07-22 ENCOUNTER — OFFICE VISIT (OUTPATIENT)
Dept: FAMILY MEDICINE CLINIC | Facility: CLINIC | Age: 81
End: 2021-07-22

## 2021-07-22 VITALS
DIASTOLIC BLOOD PRESSURE: 77 MMHG | WEIGHT: 181 LBS | OXYGEN SATURATION: 97 % | HEIGHT: 64 IN | TEMPERATURE: 97.8 F | BODY MASS INDEX: 30.9 KG/M2 | SYSTOLIC BLOOD PRESSURE: 129 MMHG | HEART RATE: 75 BPM

## 2021-07-22 DIAGNOSIS — Z99.81 CHRONIC RESPIRATORY FAILURE WITH HYPOXIA, ON HOME O2 THERAPY (HCC): Primary | ICD-10-CM

## 2021-07-22 DIAGNOSIS — J96.11 CHRONIC RESPIRATORY FAILURE WITH HYPOXIA, ON HOME O2 THERAPY (HCC): Primary | ICD-10-CM

## 2021-07-22 DIAGNOSIS — I13.10 HYPERTENSIVE HEART AND CHRONIC KIDNEY DISEASE STAGE 3 (HCC): ICD-10-CM

## 2021-07-22 DIAGNOSIS — N18.30 HYPERTENSIVE HEART AND CHRONIC KIDNEY DISEASE STAGE 3 (HCC): ICD-10-CM

## 2021-07-22 DIAGNOSIS — Z09 HOSPITAL DISCHARGE FOLLOW-UP: ICD-10-CM

## 2021-07-22 PROCEDURE — 1111F DSCHRG MED/CURRENT MED MERGE: CPT | Performed by: FAMILY MEDICINE

## 2021-07-22 PROCEDURE — 99495 TRANSJ CARE MGMT MOD F2F 14D: CPT | Performed by: FAMILY MEDICINE

## 2021-07-22 RX ORDER — MIRTAZAPINE 7.5 MG/1
7.5 TABLET, FILM COATED ORAL
COMMUNITY
Start: 2021-05-13 | End: 2021-07-22 | Stop reason: DRUGHIGH

## 2021-07-22 RX ORDER — DIPHENHYD/PE/ACETAMINOPHEN/GG 25-5-325MG
TABLET, SEQUENTIAL ORAL
COMMUNITY
Start: 2021-07-08

## 2021-07-22 NOTE — PATIENT INSTRUCTIONS
"COPD Action Plan  A COPD action plan is a description of what to do when you have a flare (exacerbation) of chronic obstructive pulmonary disease (COPD). Your action plan is a color-coded plan that lists the symptoms that indicate whether or not your condition is under control and what actions to take.  · If you have symptoms in the green zone, it means you are doing well that day.  · If you have symptoms in the yellow zone, it means you are having a bad day or an exacerbation.  · If you have symptoms in the red zone, you need urgent medical care.  Follow the plan you and your health care provider developed. Review your plan with your health care provider at each visit.  Red zone  Symptoms in this zone mean that you should get medical help right away. They include:  · Feeling very short of breath, even when you are resting.  · Not being able to do any activities because of poor breathing.  · Not being able to sleep because of poor breathing.  · Fever or shaking chills.  · Feeling confused or very sleepy.  · Chest pain.  · Coughing up blood.  If you have any of these symptoms, call emergency services (911 in the [place] or go to the nearest emergency room.  Yellow zone  Symptoms in this zone mean that your condition may be getting worse. They include:  · Feeling more short of breath than usual.  · Having less energy for daily activities than usual.  · Phlegm or mucus that is thicker than usual.  · Needing to use your rescue inhaler or nebulizer more often than usual.  · More ankle swelling than usual.  · Coughing more than usual.  · Feeling like you have a chest cold.  · Trouble sleeping due to COPD symptoms.  · Decreased appetite.  · COPD medicines not helping as much as usual.  If you experience any \"yellow\" symptoms:  · Keep taking your daily medicines as directed.  · Use your quick-relief inhaler as told by your health care provider.  · If you were prescribed steroid medicine to take by mouth (oral medicine), start " taking it as told by your health care provider.  · If you were prescribed an antibiotic, start taking it as told by your health care provider. Do not stop taking the antibiotic even if you start to feel better.  · Use oxygen as told by your health care provider.  · Get more rest.  · Do your pursed-lip breathing exercises.  · Do not smoke. Avoid any irritants in the air.  If your signs and symptoms do not improve after taking these steps, call your health care provider right away.  Green zone  Symptoms in this zone mean that you are doing well. They include:  · Being able to do your usual activities and exercise.  · Having the usual amount of coughing, including the same amount of phlegm or mucus.  · Being able to sleep well.  · Having a good appetite.  Follow these instructions at home:  · Continue taking your daily medicines as told by your health care provider.  · Make sure you receive all the immunizations that your health care provider recommends, especially the pneumococcal and influenza vaccines.  · Wash your hands often with soap and water. Have family members wash their hands too. Regular hand washing can help prevent infections.  · Follow your usual exercise and diet plan.  · Avoid irritants in the air, such as smoke.  · Do not use any products that contain nicotine or tobacco, such as cigarettes and e-cigarettes. If you need help quitting, ask your health care provider.  Where to find more information:  You can find more information about COPD from:  · American Lung Association, My COPD Action Plan: www.lung.org/assets/documents/copd/copd-action-plan.pdf  · COPD Foundation: www.copdfoundation.org  · National Heart, Lung, & Blood [place]: https://www.nhlbi.nih.gov/health-topics/copd  This information is not intended to replace advice given to you by your health care provider. Make sure you discuss any questions you have with your health care provider.  Document Revised: 02/01/2021 Document Reviewed:  05/02/2018  Elsevier Patient Education © 2021 [employer]vier Inc[employer]       ADVANCE CARE PLANNING  Conversations that Matter  PLANNING GUIDE    LOOKING BACK ...  Who we are, what we believe, and what we value are all shaped by experiences we have had. Temple, family traditions, jobs and friends affect us deeply.    Has anything happened in your past that shaped your feelings about medical treatment?    Think about an experience you may have had with a family member or friend who was faced with a decision about medical care near the end of life. What was positive about that experience? What do you wish would have been done differently?    HERE AND NOW ...  Do you have any significant health problems now? What kinds of things bring you such mark that, should a health problem prevent you from doing them any more, life would have little meaning? What short- or long-term goals do you have? How might medical treatment help you or hinder you in accomplishing those goals?    WHAT ABOUT TOMORROW?  What significant health problems do you fear may affect you in the future? How do you feel about the possibility of having to go to a nursing home? How would decisions be made if you could not make them?    WHO SHOULD MAKE DECISIONS?  An important part of planning is to consider whether or not you could appoint someone to make your healthcare decisions if you could not make them yourself. Many people select a close family member, but you are free to pick anyone you think could best represent you. Whoever you appoint should have all of the following qualifications:    • Can be trusted.  • Is willing to accept this responsibility.  • Is willing to follow the values and instructions you have discussed.  • Is able to make complex, difficult decisions.    It is helpful, but not required, to appoint one or more alternate persons in case your first choice becomes unable or unwilling to represent you. It is best if only one person has authority at  "a time, but you can instruct your representatives to discuss decisions together if time permits.    WHAT FUTURE DECISIONS NEED TO BE CONSIDERED?  Providing instructions for future healthcare decisions may seem like an impossible task. How can anyone plan for all the possibilities? You cannot … but you do not have to.    You need to plan for situations where you:  1. Become unexpectedly incapable of making your own decisions  2. It is clear you will have little or no recovery, and  3. The injury or loss of function is significant.    Such a situation might arise because of an injury to the brain from an accident, a stroke, or a slowly progressive disease such as Alzheimer's.    To plan for this type of situation, many people state, \"If I'm going to be a vegetable, let me go,\" or \"No heroics,\" or \"Don't keep me alive on machines.\" While these remarks are a beginning, they simply are too vague to guide decision-making.    You need to completely describe under what circumstances your goals for medical care should be changed from attempting to prolong life to being allowed to die. In some situations, certain treatments may not make sense because they will not help, but other treatments will be of important benefit.    Consider these three questions:  1. When would it make sense to continue certain treatments in an effort to prolong life and seek recovery?  2. When would it make sense to stop or withhold certain treatments and accept death when it comes?  3. Under any circumstance, what kind of comfort care would you want, including medication, spiritual and environmental options?    Making these choices requires understanding the information, weighing the benefits and burdens from your perspective, and then discussing your choices with those closest to you.    WHAT'S NEXT?  How do you make sure that your choices are respected? First talk, about them with your family, friends, clergy and physician, then put your choices " in writing. Information about putting your plans into writing -- in an advance directive -- is available from your healthcare organization or .    Do you have any significant health problems? What health problems do you fear in the future?     Consider what frightens you most about medical treatment. What role does Temple, kati or spirituality play in how you live your life? How does cost influence your decisions about medical care?   In terms of future medical care, under what circumstances would you want the goals of medical treatment to switch from attempting to prolong life to focusing on comfort?     Describe these circumstances in as much detail as possible.   Ask yourself, what will most help me live well at this point in my life?     Share your views with the person or people who would make your medical decisions if you could not make them.     THERE'S NO EASY WAY TO PLAN FOR FUTURE HEALTHCARE CHOICES.  It's a process that involves thinking and talking about complex and sensitive issues.    The questions that follow will help in the advance care planning process. This is a guide for your own benefit; it's not a test, and there are no right or wrong answers. It does not need to be completed all at once. You may use it to share your feelings with healthcare providers, your family and your friends. The answers to these questions will help those you love make choices for you when you cannot make them yourself.    These are things I need to tell my loved ones:  What is your idea of comfort care? Describe how you would want medications to be used to provide comfort. What type of spiritual care would you want?     I need to learn about: ____________________________  I need to ask my healthcare provider: ________________

## 2021-07-22 NOTE — PROGRESS NOTES
Transitional Care Follow Up Visit  Subjective     Diane Guan is a 81 y.o. female who presents for a transitional care management visit.    Within 48 business hours after discharge our office contacted her via telephone to coordinate her care and needs.      I reviewed and discussed the details of that call along with the discharge summary, hospital problems, inpatient lab results, inpatient diagnostic studies, and consultation reports with Diane.     Current outpatient and discharge medications have been reconciled for the patient.  Reviewed by: Jemima Fontaine MD      Date of TCM Phone Call 7/8/2021   Hospital [facility]   Date of Admission 7/2/2021   Date of Discharge 7/8/2021   Discharge Disposition Home or Self Care     Risk for Readmission (LACE) Score: 12 (7/8/2021  6:01 AM)      History of Present Illness   Course During Hospital Stay:    Postmenopausal  female with a concurrent medical history of hypertension, CKD, and chronic respiratory failure was hospitalized in early July 2021 for acute on chronic hypoxic respiratory failure secondary to left-sided community-acquired pneumonia and acute cystitis secondary to E. coli.  Patient was discharged from the hospital with a course of Augmentin.    Declined rehab or home health. Reports improvement of weakness and fatigue. Walking at home and folding laundry.    Saturating appropriately on 3L O2 via NC today though normally on 2L. Patient opposed to CPAP. Has not been vaccinated against COVID-19. Followed by pulmonology.    Normotensive in office.      The following portions of the patient's history were reviewed and updated as appropriate: allergies, current medications, past family history, past medical history, past social history, past surgical history and problem list.    Review of Systems    Objective    Vitals:    07/22/21 0855   BP: 129/77   BP Location: Left arm   Patient Position: Sitting   Cuff Size: Large Adult   Pulse: 75   Temp: 97.8  "°F (36.6 °C)   TempSrc: Infrared   SpO2: 97%   Weight: 82.1 kg (181 lb)   Height: 162.6 cm (64\")       Physical Exam  Vitals reviewed.   Constitutional:       General: She is not in acute distress.     Appearance: She is well-developed. She is obese. She is not diaphoretic.      Interventions: Nasal cannula in place.   HENT:      Head: Normocephalic and atraumatic.   Cardiovascular:      Rate and Rhythm: Normal rate and regular rhythm.   Pulmonary:      Effort: Pulmonary effort is normal.      Breath sounds: Examination of the right-lower field reveals rales. Examination of the left-lower field reveals rales. Rales present.   Skin:     General: Skin is warm and dry.   Neurological:      Mental Status: She is alert and oriented to person, place, and time.   Psychiatric:         Mood and Affect: Mood normal.         Behavior: Behavior normal.         Lab Results   Component Value Date    WBC 15.00 (H) 07/08/2021    HGB 8.7 (L) 07/08/2021    HCT 28.3 (L) 07/08/2021    MCV 88.5 07/08/2021     07/08/2021     Lab Results   Component Value Date    IRON 40 01/20/2021    TIBC 340 01/20/2021    FERRITIN 298.00 (H) 01/20/2021     Lab Results   Component Value Date    GLUCOSE 101 (H) 07/08/2021    BUN 15 07/08/2021    CREATININE 0.93 07/08/2021    EGFRIFNONA 58 (L) 07/08/2021    EGFRIFAFRI 59 (L) 04/27/2017    BCR 16.1 07/08/2021    K 4.3 07/08/2021    CO2 17.0 (L) 07/08/2021    CALCIUM 8.6 07/08/2021    ALBUMIN 3.60 07/02/2021    LABIL2 1.0 03/17/2019    AST 28 07/02/2021    ALT 12 07/02/2021     Lab Results   Component Value Date    HGBA1C 6.6 (H) 07/04/2021     Lab Results   Component Value Date    CHOL 374 (H) 01/03/2020    TRIG 311 (H) 01/03/2020    HDL 50 01/03/2020     (H) 01/03/2020       Assessment/Plan     Problem List Items Addressed This Visit        Cardiac and Vasculature    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  Encouraged low-Na+ diet & " 150 min exercise/week.   Continue amlodipine 10 mg, losartan 100 mg, and chlorthalidone 25 mg.  Monitoring renal function.            Pulmonary and Pneumonias    Chronic respiratory failure with hypoxia, on home O2 therapy (CMS/[facility]) - Primary    Overview     Continue O2 therapy, discussed titration and maintaining SpO2 >90%.  Encouraged incentive spirometry.  Continue Breztri. Samples provided. May need patient assistance application.    Followed by pulmonology, Dr. Frankel.         Relevant Medications    Budeson-Glycopyrrol-Formoterol (Breztri Aerosphere) 160-9-4.8 MCG/ACT aerosol inhaler      Other Visit Diagnoses     Hospital discharge follow-up            Follow Up  Return if symptoms worsen or fail to improve, for KEEP APPOINTMENT IN SEPTEMBER.     Signature    Jemima Fontaine MD  Family Medicine  [facility]

## 2021-08-04 DIAGNOSIS — E78.2 MIXED HYPERLIPIDEMIA: ICD-10-CM

## 2021-08-04 DIAGNOSIS — I10 ESSENTIAL HYPERTENSION: ICD-10-CM

## 2021-08-04 RX ORDER — ROSUVASTATIN CALCIUM 10 MG/1
10 TABLET, COATED ORAL NIGHTLY
Qty: 90 TABLET | Refills: 1 | Status: SHIPPED | OUTPATIENT
Start: 2021-08-04 | End: 2021-09-08 | Stop reason: SDUPTHER

## 2021-08-14 DIAGNOSIS — F41.8 MIXED ANXIETY AND DEPRESSIVE DISORDER: ICD-10-CM

## 2021-08-16 RX ORDER — MIRTAZAPINE 15 MG/1
15 TABLET, FILM COATED ORAL
Qty: 90 TABLET | Refills: 1 | Status: SHIPPED | OUTPATIENT
Start: 2021-08-16 | End: 2021-09-08 | Stop reason: SDUPTHER

## 2021-09-08 ENCOUNTER — TELEPHONE (OUTPATIENT)
Dept: FAMILY MEDICINE CLINIC | Facility: CLINIC | Age: 81
End: 2021-09-08

## 2021-09-08 DIAGNOSIS — E78.2 MIXED HYPERLIPIDEMIA: ICD-10-CM

## 2021-09-08 DIAGNOSIS — E03.9 ACQUIRED HYPOTHYROIDISM: Primary | ICD-10-CM

## 2021-09-08 DIAGNOSIS — I10 ESSENTIAL (PRIMARY) HYPERTENSION: ICD-10-CM

## 2021-09-08 DIAGNOSIS — F41.8 MIXED ANXIETY AND DEPRESSIVE DISORDER: ICD-10-CM

## 2021-09-08 DIAGNOSIS — G25.81 RESTLESS LEG SYNDROME: ICD-10-CM

## 2021-09-08 DIAGNOSIS — Z17.0 MALIGNANT NEOPLASM OF CENTRAL PORTION OF RIGHT BREAST IN FEMALE, ESTROGEN RECEPTOR POSITIVE (HCC): Chronic | ICD-10-CM

## 2021-09-08 DIAGNOSIS — I10 ESSENTIAL HYPERTENSION: ICD-10-CM

## 2021-09-08 DIAGNOSIS — K21.9 HIATAL HERNIA WITH GERD: ICD-10-CM

## 2021-09-08 DIAGNOSIS — K44.9 HIATAL HERNIA WITH GERD: ICD-10-CM

## 2021-09-08 DIAGNOSIS — C50.111 MALIGNANT NEOPLASM OF CENTRAL PORTION OF RIGHT BREAST IN FEMALE, ESTROGEN RECEPTOR POSITIVE (HCC): Chronic | ICD-10-CM

## 2021-09-08 RX ORDER — ANASTROZOLE 1 MG/1
1 TABLET ORAL DAILY
Qty: 90 TABLET | Refills: 1 | Status: SHIPPED | OUTPATIENT
Start: 2021-09-08 | End: 2022-05-04

## 2021-09-08 RX ORDER — LEVOTHYROXINE SODIUM 0.1 MG/1
100 TABLET ORAL
Qty: 90 TABLET | Refills: 0 | Status: SHIPPED | OUTPATIENT
Start: 2021-09-08 | End: 2022-03-17 | Stop reason: SDUPTHER

## 2021-09-08 RX ORDER — SERTRALINE HYDROCHLORIDE 100 MG/1
150 TABLET, FILM COATED ORAL
Qty: 135 TABLET | Refills: 1 | Status: SHIPPED | OUTPATIENT
Start: 2021-09-08 | End: 2022-03-15

## 2021-09-08 RX ORDER — METOCLOPRAMIDE 5 MG/1
5 TABLET ORAL 3 TIMES DAILY PRN
Qty: 270 TABLET | Refills: 1 | Status: SHIPPED | OUTPATIENT
Start: 2021-09-08 | End: 2022-04-08

## 2021-09-08 RX ORDER — LOSARTAN POTASSIUM 100 MG/1
100 TABLET ORAL DAILY
Qty: 90 TABLET | Refills: 1 | Status: SHIPPED | OUTPATIENT
Start: 2021-09-08 | End: 2021-10-25 | Stop reason: ALTCHOICE

## 2021-09-08 RX ORDER — CHLORTHALIDONE 25 MG/1
25 TABLET ORAL DAILY
Qty: 90 TABLET | Refills: 1 | Status: SHIPPED | OUTPATIENT
Start: 2021-09-08 | End: 2022-03-15 | Stop reason: SDUPTHER

## 2021-09-08 RX ORDER — ROSUVASTATIN CALCIUM 10 MG/1
10 TABLET, COATED ORAL NIGHTLY
Qty: 90 TABLET | Refills: 1 | Status: SHIPPED | OUTPATIENT
Start: 2021-09-08 | End: 2022-03-17 | Stop reason: SDUPTHER

## 2021-09-08 RX ORDER — ROPINIROLE 0.25 MG/1
0.25 TABLET, FILM COATED ORAL NIGHTLY
Qty: 90 TABLET | Refills: 1 | Status: SHIPPED | OUTPATIENT
Start: 2021-09-08 | End: 2022-03-17 | Stop reason: SDUPTHER

## 2021-09-08 RX ORDER — AMLODIPINE BESYLATE 10 MG/1
10 TABLET ORAL DAILY
Qty: 90 TABLET | Refills: 1 | Status: SHIPPED | OUTPATIENT
Start: 2021-09-08 | End: 2022-03-15 | Stop reason: SDUPTHER

## 2021-09-08 RX ORDER — PANTOPRAZOLE SODIUM 40 MG/1
40 TABLET, DELAYED RELEASE ORAL
Qty: 90 TABLET | Refills: 1 | Status: SHIPPED | OUTPATIENT
Start: 2021-09-08 | End: 2022-03-15

## 2021-09-08 RX ORDER — MIRTAZAPINE 15 MG/1
15 TABLET, FILM COATED ORAL
Qty: 90 TABLET | Refills: 1 | Status: SHIPPED | OUTPATIENT
Start: 2021-09-08 | End: 2022-03-17 | Stop reason: SDUPTHER

## 2021-09-08 NOTE — TELEPHONE ENCOUNTER
Pt was scheduled for her medicare wellness this Friday 9/10/21 and when I called her to reschedule she was fine with it but her daughter notified me that she will need more refills of all meds with the exception of her inhaler sent to [facility] in [place] before her appt on 10/25/21.

## 2021-10-19 DIAGNOSIS — E55.9 VITAMIN D DEFICIENCY: ICD-10-CM

## 2021-10-20 RX ORDER — ERGOCALCIFEROL 1.25 MG/1
50000 CAPSULE ORAL
Qty: 12 CAPSULE | Refills: 1 | OUTPATIENT
Start: 2021-10-20

## 2021-10-20 NOTE — TELEPHONE ENCOUNTER
Please call patient and let her know that I would like to recheck her vitamin D levels at her next office visit before refilling her vitamin D prescription.

## 2021-10-25 ENCOUNTER — OFFICE VISIT (OUTPATIENT)
Dept: FAMILY MEDICINE CLINIC | Facility: CLINIC | Age: 81
End: 2021-10-25

## 2021-10-25 VITALS
OXYGEN SATURATION: 92 % | DIASTOLIC BLOOD PRESSURE: 80 MMHG | SYSTOLIC BLOOD PRESSURE: 164 MMHG | BODY MASS INDEX: 32.61 KG/M2 | HEIGHT: 64 IN | TEMPERATURE: 99.1 F | WEIGHT: 191 LBS | HEART RATE: 81 BPM

## 2021-10-25 DIAGNOSIS — H91.93 BILATERAL HEARING LOSS, UNSPECIFIED HEARING LOSS TYPE: ICD-10-CM

## 2021-10-25 DIAGNOSIS — J44.9 CHRONIC OBSTRUCTIVE PULMONARY DISEASE, UNSPECIFIED COPD TYPE (HCC): ICD-10-CM

## 2021-10-25 DIAGNOSIS — C50.111 MALIGNANT NEOPLASM OF CENTRAL PORTION OF RIGHT BREAST IN FEMALE, ESTROGEN RECEPTOR POSITIVE (HCC): ICD-10-CM

## 2021-10-25 DIAGNOSIS — I13.10 HYPERTENSIVE HEART AND CHRONIC KIDNEY DISEASE STAGE 3 (HCC): Primary | ICD-10-CM

## 2021-10-25 DIAGNOSIS — K21.9 HIATAL HERNIA WITH GASTROESOPHAGEAL REFLUX: ICD-10-CM

## 2021-10-25 DIAGNOSIS — E66.09 CLASS 1 OBESITY DUE TO EXCESS CALORIES WITH SERIOUS COMORBIDITY AND BODY MASS INDEX (BMI) OF 32.0 TO 32.9 IN ADULT: ICD-10-CM

## 2021-10-25 DIAGNOSIS — Z23 ENCOUNTER FOR IMMUNIZATION: ICD-10-CM

## 2021-10-25 DIAGNOSIS — G47.33 OSA (OBSTRUCTIVE SLEEP APNEA): ICD-10-CM

## 2021-10-25 DIAGNOSIS — E78.2 MIXED HYPERLIPIDEMIA: ICD-10-CM

## 2021-10-25 DIAGNOSIS — Z00.00 ENCOUNTER FOR MEDICARE ANNUAL WELLNESS EXAM: ICD-10-CM

## 2021-10-25 DIAGNOSIS — E11.9 TYPE 2 DIABETES MELLITUS WITHOUT COMPLICATION, WITHOUT LONG-TERM CURRENT USE OF INSULIN (HCC): ICD-10-CM

## 2021-10-25 DIAGNOSIS — E55.9 VITAMIN D DEFICIENCY: ICD-10-CM

## 2021-10-25 DIAGNOSIS — N18.30 HYPERTENSIVE HEART AND CHRONIC KIDNEY DISEASE STAGE 3 (HCC): Primary | ICD-10-CM

## 2021-10-25 DIAGNOSIS — F41.8 MIXED ANXIETY AND DEPRESSIVE DISORDER: ICD-10-CM

## 2021-10-25 DIAGNOSIS — E03.9 ACQUIRED HYPOTHYROIDISM: ICD-10-CM

## 2021-10-25 DIAGNOSIS — K44.9 HIATAL HERNIA WITH GASTROESOPHAGEAL REFLUX: ICD-10-CM

## 2021-10-25 DIAGNOSIS — Z17.0 MALIGNANT NEOPLASM OF CENTRAL PORTION OF RIGHT BREAST IN FEMALE, ESTROGEN RECEPTOR POSITIVE (HCC): ICD-10-CM

## 2021-10-25 PROBLEM — E66.811 CLASS 1 OBESITY DUE TO EXCESS CALORIES WITH SERIOUS COMORBIDITY AND BODY MASS INDEX (BMI) OF 32.0 TO 32.9 IN ADULT: Status: ACTIVE | Noted: 2021-10-25

## 2021-10-25 PROBLEM — J18.9 PNEUMONIA OF LEFT LUNG DUE TO INFECTIOUS ORGANISM: Status: RESOLVED | Noted: 2021-07-02 | Resolved: 2021-10-25

## 2021-10-25 PROCEDURE — G0439 PPPS, SUBSEQ VISIT: HCPCS | Performed by: FAMILY MEDICINE

## 2021-10-25 PROCEDURE — 90662 IIV NO PRSV INCREASED AG IM: CPT | Performed by: FAMILY MEDICINE

## 2021-10-25 PROCEDURE — 1160F RVW MEDS BY RX/DR IN RCRD: CPT | Performed by: FAMILY MEDICINE

## 2021-10-25 PROCEDURE — G0008 ADMIN INFLUENZA VIRUS VAC: HCPCS | Performed by: FAMILY MEDICINE

## 2021-10-25 PROCEDURE — 1170F FXNL STATUS ASSESSED: CPT | Performed by: FAMILY MEDICINE

## 2021-10-25 PROCEDURE — 96160 PT-FOCUSED HLTH RISK ASSMT: CPT | Performed by: FAMILY MEDICINE

## 2021-10-25 RX ORDER — IPRATROPIUM BROMIDE AND ALBUTEROL SULFATE 2.5; .5 MG/3ML; MG/3ML
3 SOLUTION RESPIRATORY (INHALATION) EVERY 6 HOURS PRN
Qty: 360 ML | Refills: 3 | Status: SHIPPED | OUTPATIENT
Start: 2021-10-25

## 2021-10-25 RX ORDER — ERGOCALCIFEROL 1.25 MG/1
CAPSULE ORAL
COMMUNITY
Start: 2021-07-30 | End: 2022-03-15

## 2021-10-25 RX ORDER — HEARING AID ACCESSORY
1 MISCELLANEOUS MISCELLANEOUS
Qty: 52 EACH | Refills: 0 | Status: SHIPPED | OUTPATIENT
Start: 2021-10-25

## 2021-10-25 RX ORDER — IRBESARTAN 300 MG/1
300 TABLET ORAL DAILY
Qty: 90 TABLET | Refills: 1 | Status: SHIPPED | OUTPATIENT
Start: 2021-10-25 | End: 2022-03-15 | Stop reason: SDUPTHER

## 2021-10-28 ENCOUNTER — LAB (OUTPATIENT)
Dept: FAMILY MEDICINE CLINIC | Facility: CLINIC | Age: 81
End: 2021-10-28

## 2021-10-28 LAB
25(OH)D3 SERPL-MCNC: 86.9 NG/ML
ALBUMIN SERPL-MCNC: 4.5 G/DL (ref 3.5–5.2)
ALBUMIN UR-MCNC: 3.8 MG/DL
ALBUMIN/GLOB SERPL: 1.7 G/DL
ALP SERPL-CCNC: 138 U/L (ref 39–117)
ALT SERPL W P-5'-P-CCNC: 13 U/L (ref 1–33)
ANION GAP SERPL CALCULATED.3IONS-SCNC: 11.3 MMOL/L (ref 5–15)
AST SERPL-CCNC: 21 U/L (ref 1–32)
BILIRUB SERPL-MCNC: 0.3 MG/DL (ref 0–1.2)
BUN SERPL-MCNC: 24 MG/DL (ref 8–23)
BUN/CREAT SERPL: 17.8 (ref 7–25)
CALCIUM SPEC-SCNC: 9.5 MG/DL (ref 8.6–10.5)
CHLORIDE SERPL-SCNC: 102 MMOL/L (ref 98–107)
CHOLEST SERPL-MCNC: 192 MG/DL (ref 0–200)
CO2 SERPL-SCNC: 27.7 MMOL/L (ref 22–29)
CREAT SERPL-MCNC: 1.35 MG/DL (ref 0.57–1)
CREAT UR-MCNC: 99.8 MG/DL
DEPRECATED RDW RBC AUTO: 43.6 FL (ref 37–54)
EOSINOPHIL # BLD MANUAL: 0.31 10*3/MM3 (ref 0–0.4)
EOSINOPHIL NFR BLD MANUAL: 2.1 % (ref 0.3–6.2)
ERYTHROCYTE [DISTWIDTH] IN BLOOD BY AUTOMATED COUNT: 14.2 % (ref 12.3–15.4)
GFR SERPL CREATININE-BSD FRML MDRD: 38 ML/MIN/1.73
GLOBULIN UR ELPH-MCNC: 2.7 GM/DL
GLUCOSE SERPL-MCNC: 95 MG/DL (ref 65–99)
HBA1C MFR BLD: 6.4 % (ref 3.5–5.6)
HCT VFR BLD AUTO: 40 % (ref 34–46.6)
HDLC SERPL-MCNC: 49 MG/DL (ref 40–60)
HGB BLD-MCNC: 12.5 G/DL (ref 12–15.9)
LDLC SERPL CALC-MCNC: 119 MG/DL (ref 0–100)
LDLC/HDLC SERPL: 2.37 {RATIO}
LYMPHOCYTES # BLD MANUAL: 6.14 10*3/MM3 (ref 0.7–3.1)
LYMPHOCYTES NFR BLD MANUAL: 41.2 % (ref 19.6–45.3)
LYMPHOCYTES NFR BLD MANUAL: 6.2 % (ref 5–12)
MCH RBC QN AUTO: 26.7 PG (ref 26.6–33)
MCHC RBC AUTO-ENTMCNC: 31.3 G/DL (ref 31.5–35.7)
MCV RBC AUTO: 85.5 FL (ref 79–97)
MICROALBUMIN/CREAT UR: 38.1 MG/G
MONOCYTES # BLD AUTO: 0.9 10*3/MM3 (ref 0.1–0.9)
NEUTROPHILS # BLD AUTO: 7.2 10*3/MM3 (ref 1.7–7)
NEUTROPHILS NFR BLD MANUAL: 49.5 % (ref 42.7–76)
PLAT MORPH BLD: NORMAL
PLATELET # BLD AUTO: 197 10*3/MM3 (ref 140–450)
PMV BLD AUTO: 12.9 FL (ref 6–12)
POIKILOCYTOSIS BLD QL SMEAR: ABNORMAL
POTASSIUM SERPL-SCNC: 4.3 MMOL/L (ref 3.5–5.2)
PROT SERPL-MCNC: 7.2 G/DL (ref 6–8.5)
RBC # BLD AUTO: 4.68 10*6/MM3 (ref 3.77–5.28)
SMUDGE CELLS BLD QL SMEAR: ABNORMAL
SODIUM SERPL-SCNC: 141 MMOL/L (ref 136–145)
TRIGL SERPL-MCNC: 135 MG/DL (ref 0–150)
TSH SERPL DL<=0.05 MIU/L-ACNC: 3.71 UIU/ML (ref 0.27–4.2)
VARIANT LYMPHS NFR BLD MANUAL: 1 % (ref 0–5)
VLDLC SERPL-MCNC: 24 MG/DL (ref 5–40)
WBC # BLD AUTO: 14.55 10*3/MM3 (ref 3.4–10.8)

## 2021-10-28 PROCEDURE — 84443 ASSAY THYROID STIM HORMONE: CPT | Performed by: FAMILY MEDICINE

## 2021-10-28 PROCEDURE — 80053 COMPREHEN METABOLIC PANEL: CPT | Performed by: FAMILY MEDICINE

## 2021-10-28 PROCEDURE — 36415 COLL VENOUS BLD VENIPUNCTURE: CPT | Performed by: FAMILY MEDICINE

## 2021-10-28 PROCEDURE — 82043 UR ALBUMIN QUANTITATIVE: CPT | Performed by: FAMILY MEDICINE

## 2021-10-28 PROCEDURE — 85025 COMPLETE CBC W/AUTO DIFF WBC: CPT | Performed by: FAMILY MEDICINE

## 2021-10-28 PROCEDURE — 82306 VITAMIN D 25 HYDROXY: CPT | Performed by: FAMILY MEDICINE

## 2021-10-28 PROCEDURE — 80061 LIPID PANEL: CPT | Performed by: FAMILY MEDICINE

## 2021-10-28 PROCEDURE — 83036 HEMOGLOBIN GLYCOSYLATED A1C: CPT | Performed by: FAMILY MEDICINE

## 2021-10-28 PROCEDURE — 82570 ASSAY OF URINE CREATININE: CPT | Performed by: FAMILY MEDICINE

## 2021-10-28 PROCEDURE — 85007 BL SMEAR W/DIFF WBC COUNT: CPT | Performed by: FAMILY MEDICINE

## 2022-03-15 ENCOUNTER — OFFICE VISIT (OUTPATIENT)
Dept: FAMILY MEDICINE CLINIC | Facility: CLINIC | Age: 82
End: 2022-03-15

## 2022-03-15 ENCOUNTER — LAB (OUTPATIENT)
Dept: FAMILY MEDICINE CLINIC | Facility: CLINIC | Age: 82
End: 2022-03-15

## 2022-03-15 VITALS
SYSTOLIC BLOOD PRESSURE: 124 MMHG | HEART RATE: 77 BPM | WEIGHT: 183 LBS | OXYGEN SATURATION: 90 % | TEMPERATURE: 97.5 F | HEIGHT: 64 IN | DIASTOLIC BLOOD PRESSURE: 72 MMHG | BODY MASS INDEX: 31.24 KG/M2

## 2022-03-15 DIAGNOSIS — F41.8 MIXED ANXIETY AND DEPRESSIVE DISORDER: ICD-10-CM

## 2022-03-15 DIAGNOSIS — K44.9 HIATAL HERNIA WITH GERD: ICD-10-CM

## 2022-03-15 DIAGNOSIS — E03.9 ACQUIRED HYPOTHYROIDISM: ICD-10-CM

## 2022-03-15 DIAGNOSIS — E11.22 TYPE 2 DIABETES MELLITUS WITH STAGE 3B CHRONIC KIDNEY DISEASE, WITHOUT LONG-TERM CURRENT USE OF INSULIN: ICD-10-CM

## 2022-03-15 DIAGNOSIS — N18.30 HYPERTENSIVE HEART AND CHRONIC KIDNEY DISEASE STAGE 3: Primary | ICD-10-CM

## 2022-03-15 DIAGNOSIS — J44.9 CHRONIC OBSTRUCTIVE PULMONARY DISEASE, UNSPECIFIED COPD TYPE: ICD-10-CM

## 2022-03-15 DIAGNOSIS — Z23 ENCOUNTER FOR IMMUNIZATION: ICD-10-CM

## 2022-03-15 DIAGNOSIS — N18.32 TYPE 2 DIABETES MELLITUS WITH STAGE 3B CHRONIC KIDNEY DISEASE, WITHOUT LONG-TERM CURRENT USE OF INSULIN: ICD-10-CM

## 2022-03-15 DIAGNOSIS — I13.10 HYPERTENSIVE HEART AND CHRONIC KIDNEY DISEASE STAGE 3: Primary | ICD-10-CM

## 2022-03-15 DIAGNOSIS — K21.9 HIATAL HERNIA WITH GERD: ICD-10-CM

## 2022-03-15 LAB
ANION GAP SERPL CALCULATED.3IONS-SCNC: 14.7 MMOL/L (ref 5–15)
BUN SERPL-MCNC: 33 MG/DL (ref 8–23)
BUN/CREAT SERPL: 27.5 (ref 7–25)
CALCIUM SPEC-SCNC: 10 MG/DL (ref 8.6–10.5)
CHLORIDE SERPL-SCNC: 104 MMOL/L (ref 98–107)
CO2 SERPL-SCNC: 21.3 MMOL/L (ref 22–29)
CREAT SERPL-MCNC: 1.2 MG/DL (ref 0.57–1)
EGFRCR SERPLBLD CKD-EPI 2021: 45.9 ML/MIN/1.73
GLUCOSE SERPL-MCNC: 111 MG/DL (ref 65–99)
POTASSIUM SERPL-SCNC: 5.1 MMOL/L (ref 3.5–5.2)
SODIUM SERPL-SCNC: 140 MMOL/L (ref 136–145)
TSH SERPL DL<=0.05 MIU/L-ACNC: 7.73 UIU/ML (ref 0.27–4.2)

## 2022-03-15 PROCEDURE — 84443 ASSAY THYROID STIM HORMONE: CPT | Performed by: FAMILY MEDICINE

## 2022-03-15 PROCEDURE — G0009 ADMIN PNEUMOCOCCAL VACCINE: HCPCS | Performed by: FAMILY MEDICINE

## 2022-03-15 PROCEDURE — 99214 OFFICE O/P EST MOD 30 MIN: CPT | Performed by: FAMILY MEDICINE

## 2022-03-15 PROCEDURE — 36415 COLL VENOUS BLD VENIPUNCTURE: CPT | Performed by: FAMILY MEDICINE

## 2022-03-15 PROCEDURE — 80048 BASIC METABOLIC PNL TOTAL CA: CPT | Performed by: FAMILY MEDICINE

## 2022-03-15 PROCEDURE — 90732 PPSV23 VACC 2 YRS+ SUBQ/IM: CPT | Performed by: FAMILY MEDICINE

## 2022-03-15 RX ORDER — AMLODIPINE BESYLATE 10 MG/1
10 TABLET ORAL DAILY
Qty: 90 TABLET | Refills: 1 | Status: SHIPPED | OUTPATIENT
Start: 2022-03-15 | End: 2022-10-17 | Stop reason: SDUPTHER

## 2022-03-15 RX ORDER — CHLORTHALIDONE 25 MG/1
25 TABLET ORAL DAILY
Qty: 90 TABLET | Refills: 1 | Status: SHIPPED | OUTPATIENT
Start: 2022-03-15 | End: 2022-09-09

## 2022-03-15 RX ORDER — SERTRALINE HYDROCHLORIDE 100 MG/1
150 TABLET, FILM COATED ORAL
Qty: 135 TABLET | Refills: 1 | Status: SHIPPED | OUTPATIENT
Start: 2022-03-15 | End: 2022-03-17 | Stop reason: SDUPTHER

## 2022-03-15 RX ORDER — IRBESARTAN 300 MG/1
300 TABLET ORAL DAILY
Qty: 90 TABLET | Refills: 1 | Status: SHIPPED | OUTPATIENT
Start: 2022-03-15 | End: 2022-09-30

## 2022-03-15 RX ORDER — PANTOPRAZOLE SODIUM 40 MG/1
40 TABLET, DELAYED RELEASE ORAL
Qty: 90 TABLET | Refills: 1 | Status: SHIPPED | OUTPATIENT
Start: 2022-03-15 | End: 2022-09-09

## 2022-03-15 NOTE — PROGRESS NOTES
Assessment and Plan     Problem List Items Addressed This Visit        Cardiac and Vasculature    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 (HCC) - Primary    Overview     BP at goal, <140/90.  Encouraged low-Na+ diet & 150 min exercise/week.   Continue irbesartan 300 mg, amlodipine 10 mg, and chlorthalidone 25 mg.  Patient to monitor ambulatory BP.  Monitoring renal function.           Relevant Medications    irbesartan (Avapro) 300 MG tablet    chlorthalidone (HYGROTON) 25 MG tablet    amLODIPine (NORVASC) 10 MG tablet    Other Relevant Orders    Basic metabolic panel (Completed)       Endocrine and Metabolic    Acquired hypothyroidism    Overview     Treated with levothyroxine 100 mcg daily.  Monitoring TSH regularly.           Relevant Orders    Basic metabolic panel (Completed)    TSH (Completed)       Pulmonary and Pneumonias    COPD (chronic obstructive pulmonary disease) ([facility])    Overview     Hx of hypoxic respiratory failure secondary to PNA. No longer on oxygen therapy.  Encouraged incentive spirometry.  Continue Breztri. Samples and patient assistance application provided.  Continue PRN nebulizer breathing treatments.   Followed by pulmonology, Dr. Frankel.           Relevant Orders    Basic metabolic panel (Completed)    Pneumococcal Polysaccharide Vaccine 23-Valent Greater Than or Equal To 1yo Subcutaneous / IM (Completed)       Other    Type 2 diabetes mellitus with stage 3b chronic kidney disease, without long-term current use of insulin (HCC)    Overview     HbA1c at therapeutic goal, <8%.  Reduce metformin to 500 mg daily due to renal function and HbA1c.  Continue ARB for renal protection. Monitoring renal function.  Diabetic eye & foot exam due.           Relevant Medications    irbesartan (Avapro) 300 MG tablet    chlorthalidone (HYGROTON) 25 MG tablet    Other Relevant Orders    Basic metabolic panel (Completed)    Pneumococcal Polysaccharide Vaccine 23-Valent Greater Than or Equal To  "3yo Subcutaneous / IM (Completed)      Other Visit Diagnoses     Encounter for immunization        Relevant Orders    Pneumococcal Polysaccharide Vaccine 23-Valent Greater Than or Equal To 3yo Subcutaneous / IM (Completed)        Return in about 3 months (around 6/15/2022) for Recheck BP & DM II.        Patient was given instructions and counseling regarding her condition or for health maintenance advice. Please see specific information pulled into the AVS if appropriate.        Diane is a 81 y.o. being seen today for  Hypertension (F/u. Denies any concerns today. )   Subjective   History of the Present Illness     Obese postmenopausal  diabetic with a concurrent medical history of hypertension, thyroid disease, and COPD presents for follow-up.  Present with daughter.    Normotensive in office since switching ARB to irbesartan.  Denies any chest pain or lightheadedness.  Hypertension complicated by CKD 3.  Complains of weakness and fatigue with exertion.  Declined physical therapy.    Hemoglobin A1c is at therapeutic goal for patient's age with oral hypoglycemic.  Consider decreasing dose of Metformin due to well-controlled hemoglobin A1c and CKD 3B.    Thyroid disease well-controlled with levothyroxine 100 mcg daily.    Cannot afford inhalers.    Social History  She  reports that she has quit smoking. She has never used smokeless tobacco. She reports that she does not drink alcohol and does not use drugs.  Objective   Vital Signs          Vitals:    03/15/22 0927   BP: 124/72   BP Location: Left arm   Patient Position: Sitting   Cuff Size: Large Adult   Pulse: 77   Temp: 97.5 °F (36.4 °C)   TempSrc: Infrared   SpO2: 90%   Weight: 83 kg (183 lb)   Height: 162.6 cm (64\")     BP Readings from Last 1 Encounters:   03/15/22 124/72     Wt Readings from Last 3 Encounters:   03/15/22 83 kg (183 lb)   10/25/21 86.6 kg (191 lb)   07/22/21 82.1 kg (181 lb)   Body mass index is 31.41 kg/m².     Physical Exam  Vitals " reviewed.   Constitutional:       General: She is not in acute distress.     Appearance: Normal appearance. She is obese.   HENT:      Head: Normocephalic and atraumatic.   Cardiovascular:      Rate and Rhythm: Normal rate.      Heart sounds: Normal heart sounds.   Pulmonary:      Effort: Pulmonary effort is normal.      Breath sounds: Examination of the right-lower field reveals rales. Examination of the left-lower field reveals rales. Rales (dry) present.   Musculoskeletal:      Right lower leg: Edema (trace) present.      Left lower leg: No edema.   Neurological:      Mental Status: She is alert and oriented to person, place, and time.   Psychiatric:         Mood and Affect: Mood normal.         Behavior: Behavior normal.               TSH (10/28/2021 11:52)  Microalbumin / Creatinine Urine Ratio - Urine, Clean Catch (10/28/2021 11:52)  Lipid Panel (10/28/2021 11:52)  Hemoglobin A1c (10/28/2021 11:52)  Comprehensive Metabolic Panel (10/28/2021 11:52)  CBC Auto Differential (10/28/2021 11:52)

## 2022-03-15 NOTE — PATIENT INSTRUCTIONS
Preventive Care 65 Years and Older, Female  Preventive care refers to lifestyle choices and visits with your health care provider that can promote health and wellness. This includes:  A yearly physical exam. This is also called an annual well check.  Regular dental and eye exams.  Immunizations.  Screening for certain conditions.  Healthy lifestyle choices, such as diet and exercise.  What can I expect for my preventive care visit?  Physical exam  Your health care provider will check:  Height and weight. These may be used to calculate body mass index (BMI), which is a measurement that tells if you are at a healthy weight.  Heart rate and blood pressure.  Your skin for abnormal spots.  Counseling  Your health care provider may ask you questions about:  Alcohol, tobacco, and drug use.  Emotional well-being.  Home and relationship well-being.  Sexual activity.  Eating habits.  History of falls.  Memory and ability to understand (cognition).  Work and work environment.  Pregnancy and menstrual history.  What immunizations do I need?    Influenza (flu) vaccine  This is recommended every year.  Tetanus, diphtheria, and pertussis (Tdap) vaccine  You may need a Td booster every 10 years.  Varicella (chickenpox) vaccine  You may need this vaccine if you have not already been vaccinated.  Zoster (shingles) vaccine  You may need this after age 60.  Pneumococcal conjugate (PCV13) vaccine  One dose is recommended after age 65.  Pneumococcal polysaccharide (PPSV23) vaccine  One dose is recommended after age 65.  Measles, mumps, and rubella (MMR) vaccine  You may need at least one dose of MMR if you were born in 1957 or later. You may also need a second dose.  Meningococcal conjugate (MenACWY) vaccine  You may need this if you have certain conditions.  Hepatitis A vaccine  You may need this if you have certain conditions or if you travel or work in places where you may be exposed to hepatitis A.  Hepatitis B vaccine  You may need  this if you have certain conditions or if you travel or work in places where you may be exposed to hepatitis B.  Haemophilus influenzae type b (Hib) vaccine  You may need this if you have certain conditions.  You may receive vaccines as individual doses or as more than one vaccine together in one shot (combination vaccines). Talk with your health care provider about the risks and benefits of combination vaccines.  What tests do I need?  Blood tests  Lipid and cholesterol levels. These may be checked every 5 years, or more frequently depending on your overall health.  Hepatitis C test.  Hepatitis B test.  Screening  Lung cancer screening. You may have this screening every year starting at age 55 if you have a 30-pack-year history of smoking and currently smoke or have quit within the past 15 years.  Colorectal cancer screening. All adults should have this screening starting at age 50 and continuing until age 75. Your health care provider may recommend screening at age 45 if you are at increased risk. You will have tests every 1-10 years, depending on your results and the type of screening test.  Diabetes screening. This is done by checking your blood sugar (glucose) after you have not eaten for a while (fasting). You may have this done every 1-3 years.  Mammogram. This may be done every 1-2 years. Talk with your health care provider about how often you should have regular mammograms.  BRCA-related cancer screening. This may be done if you have a family history of breast, ovarian, tubal, or peritoneal cancers.  Other tests  Sexually transmitted disease (STD) testing.  Bone density scan. This is done to screen for osteoporosis. You may have this done starting at age 65.  Follow these instructions at home:  Eating and drinking  Eat a diet that includes fresh fruits and vegetables, whole grains, lean protein, and low-fat dairy products. Limit your intake of foods with high amounts of sugar, saturated fats, and  salt.  Take vitamin and mineral supplements as recommended by your health care provider.  Do not drink alcohol if your health care provider tells you not to drink.  If you drink alcohol:  Limit how much you have to 0-1 drink a day.  Be aware of how much alcohol is in your drink. In the [place] one drink equals one 12 oz bottle of beer (355 mL), one 5 oz glass of wine (148 mL), or one 1½ oz glass of hard liquor (44 mL).  Lifestyle  Take daily care of your teeth and gums.  Stay active. Exercise for at least 30 minutes on 5 or more days each week.  Do not use any products that contain nicotine or tobacco, such as cigarettes, e-cigarettes, and chewing tobacco. If you need help quitting, ask your health care provider.  If you are sexually active, practice safe sex. Use a condom or other form of protection in order to prevent STIs (sexually transmitted infections).  Talk with your health care provider about taking a low-dose aspirin or statin.  What's next?  Go to your health care provider once a year for a well check visit.  Ask your health care provider how often you should have your eyes and teeth checked.  Stay up to date on all vaccines.  This information is not intended to replace advice given to you by your health care provider. Make sure you discuss any questions you have with your health care provider.  Document Revised: 12/12/2019 Document Reviewed: 12/12/2019  Elsevier Patient Education © 2020 [employer]

## 2022-03-17 DIAGNOSIS — E11.22 TYPE 2 DIABETES MELLITUS WITH STAGE 3B CHRONIC KIDNEY DISEASE, WITHOUT LONG-TERM CURRENT USE OF INSULIN: Primary | ICD-10-CM

## 2022-03-17 DIAGNOSIS — G25.81 RESTLESS LEG SYNDROME: ICD-10-CM

## 2022-03-17 DIAGNOSIS — I10 ESSENTIAL HYPERTENSION: ICD-10-CM

## 2022-03-17 DIAGNOSIS — E78.2 MIXED HYPERLIPIDEMIA: ICD-10-CM

## 2022-03-17 DIAGNOSIS — E03.9 ACQUIRED HYPOTHYROIDISM: ICD-10-CM

## 2022-03-17 DIAGNOSIS — N18.32 TYPE 2 DIABETES MELLITUS WITH STAGE 3B CHRONIC KIDNEY DISEASE, WITHOUT LONG-TERM CURRENT USE OF INSULIN: Primary | ICD-10-CM

## 2022-03-17 DIAGNOSIS — F41.8 MIXED ANXIETY AND DEPRESSIVE DISORDER: ICD-10-CM

## 2022-03-17 RX ORDER — ROSUVASTATIN CALCIUM 10 MG/1
10 TABLET, COATED ORAL NIGHTLY
Qty: 90 TABLET | Refills: 1 | Status: SHIPPED | OUTPATIENT
Start: 2022-03-17 | End: 2022-10-17 | Stop reason: SDUPTHER

## 2022-03-17 RX ORDER — MIRTAZAPINE 15 MG/1
15 TABLET, FILM COATED ORAL
Qty: 90 TABLET | Refills: 1 | Status: SHIPPED | OUTPATIENT
Start: 2022-03-17 | End: 2022-10-17 | Stop reason: SDUPTHER

## 2022-03-17 RX ORDER — ROPINIROLE 0.25 MG/1
0.25 TABLET, FILM COATED ORAL NIGHTLY
Qty: 90 TABLET | Refills: 1 | Status: SHIPPED | OUTPATIENT
Start: 2022-03-17 | End: 2022-09-30

## 2022-03-17 RX ORDER — LEVOTHYROXINE SODIUM 0.1 MG/1
TABLET ORAL
Qty: 90 TABLET | Refills: 0 | Status: SHIPPED | OUTPATIENT
Start: 2022-03-17 | End: 2022-06-07

## 2022-03-17 RX ORDER — SERTRALINE HYDROCHLORIDE 100 MG/1
150 TABLET, FILM COATED ORAL
Qty: 135 TABLET | Refills: 1 | Status: SHIPPED | OUTPATIENT
Start: 2022-03-17 | End: 2022-08-31

## 2022-04-07 DIAGNOSIS — K44.9 HIATAL HERNIA WITH GERD: ICD-10-CM

## 2022-04-07 DIAGNOSIS — K21.9 HIATAL HERNIA WITH GERD: ICD-10-CM

## 2022-04-08 RX ORDER — METOCLOPRAMIDE 5 MG/1
5 TABLET ORAL 3 TIMES DAILY PRN
Qty: 270 TABLET | Refills: 1 | Status: SHIPPED | OUTPATIENT
Start: 2022-04-08 | End: 2022-11-04 | Stop reason: SDUPTHER

## 2022-05-04 DIAGNOSIS — Z17.0 MALIGNANT NEOPLASM OF CENTRAL PORTION OF RIGHT BREAST IN FEMALE, ESTROGEN RECEPTOR POSITIVE: Chronic | ICD-10-CM

## 2022-05-04 DIAGNOSIS — C50.111 MALIGNANT NEOPLASM OF CENTRAL PORTION OF RIGHT BREAST IN FEMALE, ESTROGEN RECEPTOR POSITIVE: Chronic | ICD-10-CM

## 2022-05-04 RX ORDER — ANASTROZOLE 1 MG/1
1 TABLET ORAL DAILY
Qty: 90 TABLET | Refills: 1 | Status: SHIPPED | OUTPATIENT
Start: 2022-05-04 | End: 2022-08-31 | Stop reason: SDUPTHER

## 2022-06-07 DIAGNOSIS — E03.9 ACQUIRED HYPOTHYROIDISM: ICD-10-CM

## 2022-06-07 RX ORDER — LEVOTHYROXINE SODIUM 0.1 MG/1
TABLET ORAL
Qty: 90 TABLET | Refills: 0 | Status: SHIPPED | OUTPATIENT
Start: 2022-06-07 | End: 2023-02-15 | Stop reason: SDUPTHER

## 2022-07-07 DIAGNOSIS — E03.9 ACQUIRED HYPOTHYROIDISM: ICD-10-CM

## 2022-07-11 RX ORDER — LEVOTHYROXINE SODIUM 0.1 MG/1
TABLET ORAL
Qty: 90 TABLET | Refills: 0 | OUTPATIENT
Start: 2022-07-11

## 2022-07-11 NOTE — TELEPHONE ENCOUNTER
Please call patient and let her know that we received a refill request for her levothyroxine.  She was due to have her thyroid rechecked because we increased her dose on Sundays.  Lab has been ordered.  She can have done at her convenience.

## 2022-08-05 ENCOUNTER — OFFICE VISIT (OUTPATIENT)
Dept: FAMILY MEDICINE CLINIC | Facility: CLINIC | Age: 82
End: 2022-08-05

## 2022-08-05 ENCOUNTER — TELEPHONE (OUTPATIENT)
Dept: FAMILY MEDICINE CLINIC | Facility: CLINIC | Age: 82
End: 2022-08-05

## 2022-08-05 VITALS
TEMPERATURE: 98.6 F | HEIGHT: 64 IN | HEART RATE: 77 BPM | SYSTOLIC BLOOD PRESSURE: 147 MMHG | BODY MASS INDEX: 32.2 KG/M2 | DIASTOLIC BLOOD PRESSURE: 80 MMHG | WEIGHT: 188.6 LBS | OXYGEN SATURATION: 90 %

## 2022-08-05 DIAGNOSIS — J44.9 CHRONIC OBSTRUCTIVE PULMONARY DISEASE, UNSPECIFIED COPD TYPE: ICD-10-CM

## 2022-08-05 DIAGNOSIS — E11.22 TYPE 2 DIABETES MELLITUS WITH STAGE 3B CHRONIC KIDNEY DISEASE, WITHOUT LONG-TERM CURRENT USE OF INSULIN: ICD-10-CM

## 2022-08-05 DIAGNOSIS — N18.30 HYPERTENSIVE HEART AND CHRONIC KIDNEY DISEASE STAGE 3: Primary | ICD-10-CM

## 2022-08-05 DIAGNOSIS — E78.2 MIXED HYPERLIPIDEMIA: ICD-10-CM

## 2022-08-05 DIAGNOSIS — N18.32 TYPE 2 DIABETES MELLITUS WITH STAGE 3B CHRONIC KIDNEY DISEASE, WITHOUT LONG-TERM CURRENT USE OF INSULIN: ICD-10-CM

## 2022-08-05 DIAGNOSIS — E66.09 CLASS 1 OBESITY DUE TO EXCESS CALORIES WITH SERIOUS COMORBIDITY AND BODY MASS INDEX (BMI) OF 32.0 TO 32.9 IN ADULT: ICD-10-CM

## 2022-08-05 DIAGNOSIS — I13.10 HYPERTENSIVE HEART AND CHRONIC KIDNEY DISEASE STAGE 3: Primary | ICD-10-CM

## 2022-08-05 DIAGNOSIS — E03.9 ACQUIRED HYPOTHYROIDISM: ICD-10-CM

## 2022-08-05 DIAGNOSIS — Z12.31 ENCOUNTER FOR SCREENING MAMMOGRAM FOR MALIGNANT NEOPLASM OF BREAST: ICD-10-CM

## 2022-08-05 DIAGNOSIS — M79.9 LESION OF SOFT TISSUE OF LOWER LEG AND ANKLE: ICD-10-CM

## 2022-08-05 PROCEDURE — 99214 OFFICE O/P EST MOD 30 MIN: CPT | Performed by: FAMILY MEDICINE

## 2022-08-05 RX ORDER — SERTRALINE HYDROCHLORIDE 150 MG/1
CAPSULE ORAL
COMMUNITY
End: 2022-12-09

## 2022-08-05 RX ORDER — ANTIOX #8/OM3/DHA/EPA/LUT/ZEAX 250-2.5 MG
1 CAPSULE ORAL 2 TIMES DAILY
COMMUNITY

## 2022-08-05 NOTE — PROGRESS NOTES
Assessment and Plan     Problem List Items Addressed This Visit        Cardiac and Vasculature    Mixed hyperlipidemia    Overview     Reviewed lipid panel & LDL goal.  Encouraged a diet rich in vegetables & 150 minutes of exercise weekly.  Continue rosuvastatin 10 mg daily.         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  Encouraged low-Na+ diet & 150 min exercise/week.   Continue irbesartan 300 mg, amlodipine 10 mg, and chlorthalidone 25 mg.  Patient to monitor ambulatory BP.  Monitoring stage 3b CKD.         Relevant Orders    CBC Auto Differential (Completed)    Comprehensive Metabolic Panel (Completed)    Lipid Panel (Completed)    Microalbumin / Creatinine Urine Ratio - Urine, Clean Catch (Completed)    Manual Differential (Completed)       Endocrine and Metabolic    Acquired hypothyroidism    Overview     Treated with levothyroxine 100 mcg 6 days a week and 200 mcg on Sunday.  Monitoring TSH regularly.         Relevant Orders    CBC Auto Differential (Completed)    Comprehensive Metabolic Panel (Completed)    TSH (Completed)    Manual Differential (Completed)    Class 1 obesity due to excess calories with serious comorbidity and body mass index (BMI) of 32.0 to 32.9 in adult    Overview     Discussed health risks associated with obesity.  Encouraged 150 minutes of exercise weekly.         Relevant Orders    CBC Auto Differential (Completed)    Comprehensive Metabolic Panel (Completed)    Lipid Panel (Completed)    Hemoglobin A1c (Completed)    Manual Differential (Completed)       Pulmonary and Pneumonias    COPD (chronic obstructive pulmonary disease) (HCC)    Overview     Hx of tobacco use.  Hx of hypoxic respiratory failure secondary to PNA. No longer on oxygen  therapy.  Encouraged incentive spirometry.  Continue Breztri. Samples and patient assistance application provided.  Continue PRN nebulizer breathing treatments.   Followed by pulmonology, Dr. Frankel.         Relevant Orders    CBC Auto Differential (Completed)    Comprehensive Metabolic Panel (Completed)    Manual Differential (Completed)       Other    Type 2 diabetes mellitus with stage 3b chronic kidney disease, without long-term current use of insulin (HCC)    Overview     HbA1c at therapeutic goal, <8%.  Continue metformin 500 mg daily.  Continue ARB for renal protection. Monitoring renal function.  Diabetic eye & foot exam due.         Relevant Orders    CBC Auto Differential (Completed)    Comprehensive Metabolic Panel (Completed)    Microalbumin / Creatinine Urine Ratio - Urine, Clean Catch (Completed)    Hemoglobin A1c (Completed)    Vitamin B12 (Completed)    Manual Differential (Completed)      Other Visit Diagnoses     Lesion of soft tissue of lower leg and ankle        Relevant Orders    Ambulatory Referral to Dermatology (Completed)    Encounter for screening mammogram for malignant neoplasm of breast        Relevant Orders    Mammo Screening Digital Tomosynthesis Bilateral With CAD        Return in about 3 months (around 11/5/2022) for Medicare Wellness.        Patient was given instructions and counseling regarding her condition or for health maintenance advice. Please see specific information pulled into the AVS if appropriate.        Diane is a 82 y.o. being seen today for  Hypertension and Diabetes   Subjective   History of the Present Illness     Obese post menopausal white diabetic female with CMHx of HTN, HLD, CKD3, COPD, and thyroid disease presents for follow up. Present with daughter.    Hypertensive in office though BP approaching goal. Reports ambulatory BP in the 130s/80s.  Reports compliance with statin therapy.    Complains of coughing when supine at night. No longer requiring oxygen  "therapy.  [facility]  Was receiving medication through [facility] patient assistance program.    Thyroid disease well-controlled with levothyroxine.    Diabetes controlled with oral hypoglycemic therapy.    Complains of persistent lesion of the left medial ankle.    Scheduled to have cataracts removed next week.    Social History  She  reports that she quit smoking about 30 years ago. Her smoking use included cigarettes. She started smoking about 32 years ago. She has a 1.00 pack-year smoking history. She has never used smokeless tobacco. She reports that she does not drink alcohol and does not use drugs.  Objective   Vital Signs          Vitals:    08/05/22 1559   BP: 147/80   BP Location: Left arm   Patient Position: Sitting   Cuff Size: Large Adult   Pulse: 77   Temp: 98.6 °F (37 °C)   TempSrc: Oral   SpO2: 90%   Weight: 85.5 kg (188 lb 9.6 oz)   Height: 162.6 cm (64\")     BP Readings from Last 1 Encounters:   08/05/22 147/80     Wt Readings from Last 3 Encounters:   08/05/22 85.5 kg (188 lb 9.6 oz)   03/15/22 83 kg (183 lb)   10/25/21 86.6 kg (191 lb)   Body mass index is 32.37 kg/m².     Physical Exam  Vitals reviewed.   Constitutional:       General: She is not in acute distress.     Appearance: Normal appearance. She is obese.   HENT:      Head: Normocephalic and atraumatic.   Cardiovascular:      Rate and Rhythm: Normal rate.      Heart sounds: Normal heart sounds.   Pulmonary:      Effort: Pulmonary effort is normal.      Breath sounds: Wheezing present.   Skin:     Findings: Lesion present.   Neurological:      Mental Status: She is alert and oriented to person, place, and time.   Psychiatric:         Mood and Affect: Mood normal.         Behavior: Behavior normal.                       TSH (03/15/2022 10:46)  Basic metabolic panel (03/15/2022 10:46)  Vitamin D 25 Hydroxy (10/28/2021 11:52)  Microalbumin / Creatinine Urine Ratio - Urine, Clean Catch (10/28/2021 " 11:52)  Hemoglobin A1c (10/28/2021 11:52)  Lipid Panel (10/28/2021 11:52)

## 2022-08-05 NOTE — TELEPHONE ENCOUNTER
Please call patient to ask what the FMLA form is regarding. I saw patient in office today and there was no mention of this.

## 2022-08-08 NOTE — TELEPHONE ENCOUNTER
I spoke with Marianna ( her daughter) she states there should not be anything regarding FMLA for this patient.     But she did want me to let you know ever since Friday she has had a fall,feeling very weak. Her sugars have been 112-120. Daughter said after she took the new inhaler. (Which I didn't see which one was given in chart) this all has happened. She had her quite taking that then had her take one more metformin then her sugar went to 182.

## 2022-08-08 NOTE — TELEPHONE ENCOUNTER
Please call patient and let her know that I apologize.  Breztri can theoretically cause elevations in blood sugar.  I would like to check hemoglobin A1c before we make any decisions going forward with the metformin.  She was wheezing during her office visit and would benefit from an inhaler.  She has tolerated in the past.  It has the potential to lower her blood pressure.  I recommend keeping an eye on the blood pressure.  If the top number for blood pressure drops below 100, it can make her feel lightheaded or dizzy.  Please remember to drink plenty water, at least 64 ounces daily.

## 2022-08-08 NOTE — TELEPHONE ENCOUNTER
Please call patient and let her know that Breztri (her inhaler) should not cause drops in blood sugar.  If she is having hard time tolerating her sugars in the low 100s, I would recommend holding metformin at this time.  Metformin does not cause quick drops in blood sugar.  We will recheck hemoglobin A1c later this week and see if we need to continue metformin at all.

## 2022-08-08 NOTE — TELEPHONE ENCOUNTER
I am sorry there was a miscommunication. Her normal levels are usually 112-120. It was running 182 yesterday. Marianna (daughter) read that this inhaler can calls increased sugar levels,weakness and fatigued.  Perhaps she is not using the inhaler properly and maybe needs a spacer for it? Let me know.

## 2022-08-24 ENCOUNTER — LAB (OUTPATIENT)
Dept: FAMILY MEDICINE CLINIC | Facility: CLINIC | Age: 82
End: 2022-08-24

## 2022-08-24 ENCOUNTER — TELEPHONE (OUTPATIENT)
Dept: FAMILY MEDICINE CLINIC | Facility: CLINIC | Age: 82
End: 2022-08-24

## 2022-08-24 DIAGNOSIS — C91.10 CHRONIC LYMPHOID LEUKEMIA: Primary | ICD-10-CM

## 2022-08-24 LAB
ALBUMIN SERPL-MCNC: 3.9 G/DL (ref 3.5–5.2)
ALBUMIN UR-MCNC: 1.4 MG/DL
ALBUMIN/GLOB SERPL: 1.3 G/DL
ALP SERPL-CCNC: 148 U/L (ref 39–117)
ALT SERPL W P-5'-P-CCNC: 10 U/L (ref 1–33)
ANION GAP SERPL CALCULATED.3IONS-SCNC: 15 MMOL/L (ref 5–15)
AST SERPL-CCNC: 15 U/L (ref 1–32)
BASOPHILS # BLD MANUAL: 0.3 10*3/MM3 (ref 0–0.2)
BASOPHILS NFR BLD MANUAL: 1 % (ref 0–1.5)
BILIRUB SERPL-MCNC: 0.3 MG/DL (ref 0–1.2)
BUN SERPL-MCNC: 24 MG/DL (ref 8–23)
BUN/CREAT SERPL: 17.9 (ref 7–25)
BURR CELLS BLD QL SMEAR: ABNORMAL
CALCIUM SPEC-SCNC: 9.6 MG/DL (ref 8.6–10.5)
CHLORIDE SERPL-SCNC: 105 MMOL/L (ref 98–107)
CHOLEST SERPL-MCNC: 174 MG/DL (ref 0–200)
CO2 SERPL-SCNC: 21 MMOL/L (ref 22–29)
CREAT SERPL-MCNC: 1.34 MG/DL (ref 0.57–1)
CREAT UR-MCNC: 110.5 MG/DL
DEPRECATED RDW RBC AUTO: 44.8 FL (ref 37–54)
EGFRCR SERPLBLD CKD-EPI 2021: 39.9 ML/MIN/1.73
ERYTHROCYTE [DISTWIDTH] IN BLOOD BY AUTOMATED COUNT: 14.8 % (ref 12.3–15.4)
GLOBULIN UR ELPH-MCNC: 2.9 GM/DL
GLUCOSE SERPL-MCNC: 117 MG/DL (ref 65–99)
HBA1C MFR BLD: 6.9 % (ref 3.5–5.6)
HCT VFR BLD AUTO: 35.5 % (ref 34–46.6)
HDLC SERPL-MCNC: 34 MG/DL (ref 40–60)
HGB BLD-MCNC: 11 G/DL (ref 12–15.9)
LDLC SERPL CALC-MCNC: 98 MG/DL (ref 0–100)
LDLC/HDLC SERPL: 2.66 {RATIO}
LYMPHOCYTES # BLD MANUAL: 14.28 10*3/MM3 (ref 0.7–3.1)
LYMPHOCYTES NFR BLD MANUAL: 3.1 % (ref 5–12)
MCH RBC QN AUTO: 26.2 PG (ref 26.6–33)
MCHC RBC AUTO-ENTMCNC: 31 G/DL (ref 31.5–35.7)
MCV RBC AUTO: 84.5 FL (ref 79–97)
MICROALBUMIN/CREAT UR: 12.7 MG/G
MONOCYTES # BLD: 0.94 10*3/MM3 (ref 0.1–0.9)
NEUTROPHILS # BLD AUTO: 14.92 10*3/MM3 (ref 1.7–7)
NEUTROPHILS NFR BLD MANUAL: 49 % (ref 42.7–76)
PLAT MORPH BLD: NORMAL
PLATELET # BLD AUTO: 402 10*3/MM3 (ref 140–450)
PMV BLD AUTO: 11.1 FL (ref 6–12)
POIKILOCYTOSIS BLD QL SMEAR: ABNORMAL
POTASSIUM SERPL-SCNC: 5.5 MMOL/L (ref 3.5–5.2)
PROT SERPL-MCNC: 6.8 G/DL (ref 6–8.5)
RBC # BLD AUTO: 4.2 10*6/MM3 (ref 3.77–5.28)
SMUDGE CELLS BLD QL SMEAR: ABNORMAL
SODIUM SERPL-SCNC: 141 MMOL/L (ref 136–145)
TRIGL SERPL-MCNC: 248 MG/DL (ref 0–150)
TSH SERPL DL<=0.05 MIU/L-ACNC: 2.52 UIU/ML (ref 0.27–4.2)
VARIANT LYMPHS NFR BLD MANUAL: 46.9 % (ref 19.6–45.3)
VIT B12 BLD-MCNC: 253 PG/ML (ref 211–946)
VLDLC SERPL-MCNC: 42 MG/DL (ref 5–40)
WBC NRBC COR # BLD: 30.45 10*3/MM3 (ref 3.4–10.8)

## 2022-08-24 PROCEDURE — 82570 ASSAY OF URINE CREATININE: CPT | Performed by: FAMILY MEDICINE

## 2022-08-24 PROCEDURE — 36415 COLL VENOUS BLD VENIPUNCTURE: CPT | Performed by: FAMILY MEDICINE

## 2022-08-24 PROCEDURE — 83036 HEMOGLOBIN GLYCOSYLATED A1C: CPT | Performed by: FAMILY MEDICINE

## 2022-08-24 PROCEDURE — 80061 LIPID PANEL: CPT | Performed by: FAMILY MEDICINE

## 2022-08-24 PROCEDURE — 82607 VITAMIN B-12: CPT | Performed by: FAMILY MEDICINE

## 2022-08-24 PROCEDURE — 85025 COMPLETE CBC W/AUTO DIFF WBC: CPT | Performed by: FAMILY MEDICINE

## 2022-08-24 PROCEDURE — 82043 UR ALBUMIN QUANTITATIVE: CPT | Performed by: FAMILY MEDICINE

## 2022-08-24 PROCEDURE — 84443 ASSAY THYROID STIM HORMONE: CPT | Performed by: FAMILY MEDICINE

## 2022-08-24 PROCEDURE — 80053 COMPREHEN METABOLIC PANEL: CPT | Performed by: FAMILY MEDICINE

## 2022-08-24 PROCEDURE — 85007 BL SMEAR W/DIFF WBC COUNT: CPT | Performed by: FAMILY MEDICINE

## 2022-08-26 ENCOUNTER — TELEPHONE (OUTPATIENT)
Dept: ONCOLOGY | Facility: CLINIC | Age: 82
End: 2022-08-26

## 2022-08-29 DIAGNOSIS — N18.32 TYPE 2 DIABETES MELLITUS WITH STAGE 3B CHRONIC KIDNEY DISEASE, WITHOUT LONG-TERM CURRENT USE OF INSULIN: ICD-10-CM

## 2022-08-29 DIAGNOSIS — E11.22 TYPE 2 DIABETES MELLITUS WITH STAGE 3B CHRONIC KIDNEY DISEASE, WITHOUT LONG-TERM CURRENT USE OF INSULIN: ICD-10-CM

## 2022-08-30 NOTE — PROGRESS NOTES
Hematology/Oncology Outpatient Consultation    Patient name: Diane Guan  : 1940  MRN: [id number]  Primary Care Physician: Jemima Fontaine MD  Referring Physician: Jemima Fontaine MD  Reason For Consult:     Chief Complaint   Patient presents with   • Appointment     CLL       History of Present Illness:    This is an 82-year-old female with a history of stage II right breast cancer for which she underwent right mastectomy.  Tumor was invasive well-differentiated ductal carcinoma, estrogen receptor +100%, progesterone receptor +100% and HER2/juan luis was negative.  Patient has been on anastrozole since 2019 prescribed by Dr. Palmer.    Review of her records also suggest that she has a history of CLL diagnosed in 2017 for persistent leukocytosis.  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    Over the past several years she has had progressive decline in her functionality.  She sits or lays down more than half of the time at home.  She was able to complete her daily activities.  She has significant night sweats but no weight loss.  If anything she is beginning to gain weight.    She was last seen in our office in 2019      Recently she had a CBC done which showed persistent leukocytosis with white count ranging from 15-30,000, she has anemia with hemoglobin of 11.4, normal MCV and normal platelets.  She has differential lymphocytosis noted on her counts.  On her chemistry panel she has a creatinine that ranges between 0.9-1.34      Patient is  and lives with her spouse.  She is accompanied today by her daughter for this appointment.        Past Medical History:   Diagnosis Date   • Acute bronchitis due to human  metapneumovirus 2/8/2020   • Anxiety disorder    • Arthritis    • Breast cancer (HCC) 02/2019    Invasive ductal carcinoma   • Chronic lymphocytic leukemia (CLL), B-cell (HCC) 01/2019   • Depression    • Disease of thyroid gland    • Diverticulosis of colon 2018    Identified on colonoscopy   • Dysphagia 12/2020   • Dyspnea on exertion    • Hemorrhoid    • Hiatal hernia 12/2020   • Hiatal hernia with GERD     large hiatal hernia with high-grade reflux on barium swallow; s/p laparoscopic fundoplication with gastropexy   • History of diabetes mellitus     no meds now   • Hyperlipidemia    • Hypertension    • Mycobacterium avium complex (HCC) 02/2019    aspiration pneumonia; completed abx therapy   • OAB (overactive bladder)    • Sleep apnea     daughter states pt does not have and doesn't have machine       Past Surgical History:   Procedure Laterality Date   • BLADDER SURGERY     • BRONCHOSCOPY N/A 9/13/2019    Procedure: BRONCHOSCOPY with bronchial washing;  Surgeon: Marnie Frankel MD;  Location: [facility] ENDOSCOPY;  Service: Pulmonary   • COLONOSCOPY  07/19/2018    severe diverticulosis   • CYST REMOVAL      Removed a fatty cyst off of her back   • ENDOSCOPY N/A 11/23/2020    Procedure: ESOPHAGOGASTRODUODENOSCOPY with dilatation (Bougie # 48, 50, 52, 54, 56, 58);  Surgeon: Den Plummer DO;  Location: [facility] ENDOSCOPY;  Service: General;  Laterality: N/A;  Post: large hiatal hernia, joshua's ulcers   • HIATAL HERNIA REPAIR N/A 12/30/2020    Procedure: Laparoscopic hiatal hernia repair with gastropexy;  Surgeon: Den Plummer DO;  Location: [facility] MAIN OR;  Service: General;  Laterality: N/A;   • MASTECTOMY Right 02/04/2019    Invasive ductal carcinoma   • TUBAL ABDOMINAL LIGATION           Current Outpatient Medications:   •  Accu-Chek Softclix Lancets lancets, 1 each by Other route Daily. Use as instructed, Disp: 100 each, Rfl: 0  •  Alcohol Swabs ([employer] Alcohol Prep Pads) 70 % pads, APPLY 1 EACH TOPICALLY DAILY.,  Disp: , Rfl:   •  amLODIPine (NORVASC) 10 MG tablet, Take 1 tablet by mouth Daily., Disp: 90 tablet, Rfl: 1  •  anastrozole (ARIMIDEX) 1 MG tablet, Take 1 tablet by mouth Daily., Disp: 90 tablet, Rfl: 1  •  cetirizine (zyrTEC) 10 MG tablet, Take 10 mg by mouth every night at bedtime., Disp: , Rfl:   •  chlorthalidone (HYGROTON) 25 MG tablet, Take 1 tablet by mouth Daily., Disp: 90 tablet, Rfl: 1  •  Cyanocobalamin (HM Super Vitamin B12) 2500 MCG chewable tablet, Chew., Disp: , Rfl:   •  docusate sodium (COLACE) 100 MG capsule, Take 100 mg by mouth 2 (Two) Times a Day., Disp: , Rfl: 3  •  ferrous sulfate 324 (65 Fe) MG tablet delayed-release EC tablet, Take 324 mg by mouth 3 (Three) Times a Week., Disp: , Rfl:   •  glucose blood (Accu-Chek Guide) test strip, 1 each by Other route Daily. Use as instructed, Disp: 100 each, Rfl: 0  •  Hearing Aid Batteries misc, 1 Units Every 7 (Seven) Days., Disp: 52 each, Rfl: 0  •  irbesartan (Avapro) 300 MG tablet, Take 1 tablet by mouth Daily., Disp: 90 tablet, Rfl: 1  •  Isopropyl Alcohol (Alcohol Wipes) 70 % misc, Apply 1 each topically Daily., Disp: 100 each, Rfl: 0  •  levothyroxine (SYNTHROID, LEVOTHROID) 100 MCG tablet, TAKE 1 TABLET (100 MCG) BY MOUTH MONDAY THROUGH SATURDAY. TAKE 2 TABLETS (200 MCG) ON SUNDAY., Disp: 90 tablet, Rfl: 0  •  metFORMIN (GLUCOPHAGE) 500 MG tablet, Take 1 tablet by mouth Daily With Breakfast., Disp: 90 tablet, Rfl: 1  •  metoclopramide (REGLAN) 5 MG tablet, Take 1 tablet by mouth 3 (Three) Times a Day As Needed (for nausea and vomiting)., Disp: 270 tablet, Rfl: 1  •  mirtazapine (REMERON) 15 MG tablet, Take 1 tablet by mouth every night at bedtime., Disp: 90 tablet, Rfl: 1  •  multivitamins-minerals (PRESERVISION AREDS 2) capsule capsule, Take 1 capsule by mouth 2 (Two) Times a Day., Disp: , Rfl:   •  pantoprazole (PROTONIX) 40 MG EC tablet, TAKE 1 TABLET BY MOUTH EVERY MORNING BEFORE BREAKFAST. TAKE ON AN EMPTY STOMACH!, Disp: 90 tablet, Rfl:  1  •  rOPINIRole (REQUIP) 0.25 MG tablet, Take 1 tablet by mouth Every Night. Take 1 hour before bedtime., Disp: 90 tablet, Rfl: 1  •  rosuvastatin (CRESTOR) 10 MG tablet, Take 1 tablet by mouth Every Night., Disp: 90 tablet, Rfl: 1  •  Sertraline HCl 150 MG capsule, Take  by mouth. 1 1/2  tablet at bedtime., Disp: , Rfl:   •  Budeson-Glycopyrrol-Formoterol (Breztri Aerosphere) 160-9-4.8 MCG/ACT aerosol inhaler, Inhale 2 puffs 2 (Two) Times a Day. Rinse mouth after each use!, Disp: 10.7 g, Rfl: 5  •  ipratropium-albuterol (DUO-NEB) 0.5-2.5 mg/3 ml nebulizer, Take 3 mL by nebulization Every 6 (Six) Hours As Needed for Wheezing or Shortness of Air., Disp: 360 mL, Rfl: 3    No Known Allergies    Immunization History   Administered Date(s) Administered   • Flu Vaccine Quad PF >36MO 10/12/2015, 2017   • FluLaval/Fluzone >6mos 2021   • Fluzone High Dose =>65 Years ([employer] ONLY) 2017, 2019   • Fluzone High-Dose 65+yrs 10/25/2021   • H1N1 Inj 2009   • Pneumococcal Conjugate 13-Valent (PCV13) 10/12/2015   • Pneumococcal Polysaccharide (PPSV23) 03/15/2022       Family History   Problem Relation Age of Onset   • Diabetes Mother    • Arthritis Other    • Heart disease Other        Cancer-related family history is not on file.    Social History     Tobacco Use   • Smoking status: Former Smoker     Packs/day: 0.50     Years: 2.00     Pack years: 1.00     Types: Cigarettes     Start date:      Quit date:      Years since quittin.6   • Smokeless tobacco: Never Used   Vaping Use   • Vaping Use: Never used   Substance Use Topics   • Alcohol use: No   • Drug use: No       ROS:    Review of Systems   Constitutional: Positive for fatigue. Negative for chills and fever.   HENT: Negative for congestion, drooling, ear discharge, rhinorrhea, sinus pressure and tinnitus.    Eyes: Negative for photophobia, pain and discharge.   Respiratory: Negative for apnea, choking and stridor.    Cardiovascular:  "Negative for palpitations.   Gastrointestinal: Negative for abdominal distention, abdominal pain and anal bleeding.   Endocrine: Negative for polydipsia and polyphagia.   Genitourinary: Negative for decreased urine volume, flank pain and genital sores.   Musculoskeletal: Negative for gait problem, neck pain and neck stiffness.   Skin: Negative for color change, rash and wound.   Neurological: Positive for weakness. Negative for tremors, seizures, syncope, facial asymmetry and speech difficulty.   Hematological: Negative for adenopathy.   Psychiatric/Behavioral: Negative for agitation, confusion, hallucinations and self-injury. The patient is not hyperactive.        Objective:     Vitals:    08/31/22 0832   BP: 142/76   Pulse: 89   Resp: 18   Temp: 96.8 °F (36 °C)   TempSrc: Infrared   Weight: 85.3 kg (188 lb)   Height: 162.6 cm (64\")   PainSc: 0-No pain     Body mass index is 32.27 kg/m².    ECOG  (2) Ambulatory and capable of self care, unable to carry out work activity, up and about > 50% or waking hours    Physical Exam:  Physical Exam  Vitals and nursing note reviewed.   Constitutional:       General: She is not in acute distress.     Appearance: She is not diaphoretic.   HENT:      Head: Normocephalic and atraumatic.   Eyes:      General: No scleral icterus.        Right eye: No discharge.         Left eye: No discharge.      Conjunctiva/sclera: Conjunctivae normal.   Neck:      Thyroid: No thyromegaly.   Cardiovascular:      Rate and Rhythm: Normal rate and regular rhythm.      Heart sounds: Normal heart sounds.     No friction rub. No gallop.   Pulmonary:      Effort: Pulmonary effort is normal. No respiratory distress.      Breath sounds: No stridor. Rhonchi present. No wheezing.   Abdominal:      General: Bowel sounds are normal.      Palpations: Abdomen is soft. There is no mass.      Tenderness: There is no abdominal tenderness. There is no guarding or rebound.   Musculoskeletal:         General: No " tenderness. Normal range of motion.      Cervical back: Normal range of motion and neck supple.   Lymphadenopathy:      Cervical: No cervical adenopathy.   Skin:     General: Skin is warm.      Findings: No erythema or rash.   Neurological:      Mental Status: She is alert and oriented to person, place, and time.      Motor: No abnormal muscle tone.   Psychiatric:         Behavior: Behavior normal.         RECENT LABS  WBC   Date Value Ref Range Status   08/31/2022 25.21 (H) 3.40 - 10.80 10*3/mm3 Final     RBC   Date Value Ref Range Status   08/31/2022 4.28 3.77 - 5.28 10*6/mm3 Final     Hemoglobin   Date Value Ref Range Status   08/31/2022 11.4 (L) 12.0 - 15.9 g/dL Final     Hematocrit   Date Value Ref Range Status   08/31/2022 37.0 34.0 - 46.6 % Final     MCV   Date Value Ref Range Status   08/31/2022 86.4 79.0 - 97.0 fL Final     MCH   Date Value Ref Range Status   08/31/2022 26.6 26.6 - 33.0 pg Final     MCHC   Date Value Ref Range Status   08/31/2022 30.8 (L) 31.5 - 35.7 g/dL Final     RDW   Date Value Ref Range Status   08/31/2022 17.1 (H) 12.3 - 15.4 % Final     RDW-SD   Date Value Ref Range Status   08/31/2022 53.2 37.0 - 54.0 fl Final     MPV   Date Value Ref Range Status   08/31/2022 11.8 6.0 - 12.0 fL Final     Platelets   Date Value Ref Range Status   08/31/2022 205 140 - 450 10*3/mm3 Final     Neutrophil %   Date Value Ref Range Status   08/31/2022 25.7 (L) 42.7 - 76.0 % Final     Lymphocyte %   Date Value Ref Range Status   08/31/2022 69.6 (H) 19.6 - 45.3 % Final     Monocyte %   Date Value Ref Range Status   08/31/2022 3.6 (L) 5.0 - 12.0 % Final     Eosinophil %   Date Value Ref Range Status   08/31/2022 1.0 0.3 - 6.2 % Final     Basophil %   Date Value Ref Range Status   08/31/2022 0.1 0.0 - 1.5 % Final     Neutrophils, Absolute   Date Value Ref Range Status   08/31/2022 6.48 1.70 - 7.00 10*3/mm3 Final     Lymphocytes, Absolute   Date Value Ref Range Status   08/31/2022 17.54 (H) 0.70 - 3.10 10*3/mm3  Final     Monocytes, Absolute   Date Value Ref Range Status   08/31/2022 0.92 (H) 0.10 - 0.90 10*3/mm3 Final     Eosinophils, Absolute   Date Value Ref Range Status   08/31/2022 0.25 0.00 - 0.40 10*3/mm3 Final     Basophils, Absolute   Date Value Ref Range Status   08/31/2022 0.02 0.00 - 0.20 10*3/mm3 Final     nRBC   Date Value Ref Range Status   03/15/2019 0 0 /100[WBCs] Final       Lab Results   Component Value Date    GLUCOSE 117 (H) 08/24/2022    BUN 24 (H) 08/24/2022    CREATININE 1.34 (H) 08/24/2022    EGFRIFNONA 38 (L) 10/28/2021    EGFRIFAFRI 59 (L) 04/27/2017    BCR 17.9 08/24/2022    K 5.5 (H) 08/24/2022    CO2 21.0 (L) 08/24/2022    CALCIUM 9.6 08/24/2022    ALBUMIN 3.90 08/24/2022    LABIL2 1.0 03/17/2019    AST 15 08/24/2022    ALT 10 08/24/2022         Assessment & Plan   Chronic lymphoid leukemia (HCC)  - CBC & Differential      · Chronic lymphocytic leukemia clinical stage 0 diagnosed in 2017 on peripheral blood flow cytometry.  This was ordered due to persistent leukocytosis with differential lymphocytosis patient had CT scans which did not show any significant lymphadenopathy at the time and therefore she was observed.    · She presents today with significant fatigue, progressive night sweats.  There is concern for possible progression of her CLL.  She has B symptoms  · Clinical Stage II right breast cancer: Status post right mastectomy.  Invasive well-differentiated ductal carcinoma of the right breast.  ER positive, SC positive and HER2/juan luis negative .  Patient is currently on Arimidex initiated in 2019.  Last mammogram was in April 2021 and last DEXA scan was 4/14/2021 and was  normal  · History of iron deficiency anemia  · Lesion involving the left medial malleolus worrisome for skin malignancy.  Patient is scheduled with dermatologist  · COPD        Discussion       CLL, we discussed that indications for treatment would include but not limited to, pancytopenia, systemic symptoms including  fatigue, night sweats and weight loss.  Other indications treatment will include recurrent infections, organ dysfunction due to massive lymphadenopathy, doubling  of leukocytosis in less than 6 months.  We discussed that CLL is not curable but can respond to chemo immunotherapy and some molecular targeted agents which can result in remissions but this disease is faulted by multiple relapses over the course of time.  We also discussed that median survivorship with low-grade lymphoma in general is estimated anywhere from 10-12 years.  I also explained that this disease is heterogeneous.  Some patients may succumb to the illness within a few days following diagnosis while some may have it for decades.  We discussed that lymphoproliferative diseases in general can affect the immune system so there is risk of  infections.  When the bone marrow is heavily involved there is risk of pancytopenia  which can result in blood, platelet transfusions and also predisposition to infections if the white count is affected.  There is also a risk of transformation to high-grade lymphoma which can result in significant symptoms and would require systemic chemo immunotherapy.  The risk of transformation varies with different types of lymphoma.  Also is not dependent on prior treatment or worsen by conservative management.        Plans:    · Biochemical testing including CMP, LDH, uric acid today and peripheral smear.  We will also send her blood for flow cytometry  · If we find significant peripheral lymphadenopathy on her imaging studies, would like to biopsy to exclude transformation  · Schedule CT scans chest, abdomen and pelvis without contrast due to renal insufficiency  · Renew Arimidex 1 mg p.o. daily #90 with refills  · Patient will be due for left diagnostic mammogram due now once her work-up is completed for the CLL.  Status post right mastectomy.  Mammogram was due in April 2022  · DEXA scan will be due again in  4/14/2023  · Patient encouraged to begin calcium and vitamin D 600 mg twice a day  · We will complete anemia work-up with iron studies and ferritin as well as folate and reticulocyte count today  · She has been encouraged to have her COVID vaccinations completed as well as flu vaccine  · Follow-up in about 3 to 4 weeks from now but we watch her for her results as they return  · Follow-up with her dermatologist for left ankle lesions suspicious for primary skin cancer  · All questions answered    Patient verbalized understanding and is in agreement of the above plan.              I spent 60 total minutes, face-to-face, caring for Diane today.  90% of this time involved counseling and/or coordination of care as documented within this note.

## 2022-08-31 ENCOUNTER — CONSULT (OUTPATIENT)
Dept: ONCOLOGY | Facility: CLINIC | Age: 82
End: 2022-08-31

## 2022-08-31 ENCOUNTER — LAB (OUTPATIENT)
Dept: LAB | Facility: HOSPITAL | Age: 82
End: 2022-08-31

## 2022-08-31 VITALS
HEART RATE: 89 BPM | RESPIRATION RATE: 18 BRPM | DIASTOLIC BLOOD PRESSURE: 76 MMHG | HEIGHT: 64 IN | TEMPERATURE: 96.8 F | SYSTOLIC BLOOD PRESSURE: 142 MMHG | WEIGHT: 188 LBS | BODY MASS INDEX: 32.1 KG/M2

## 2022-08-31 DIAGNOSIS — C50.111 MALIGNANT NEOPLASM OF CENTRAL PORTION OF RIGHT BREAST IN FEMALE, ESTROGEN RECEPTOR POSITIVE: Chronic | ICD-10-CM

## 2022-08-31 DIAGNOSIS — C91.10 CHRONIC LYMPHOID LEUKEMIA: Primary | ICD-10-CM

## 2022-08-31 DIAGNOSIS — C91.10 CHRONIC LYMPHOID LEUKEMIA: ICD-10-CM

## 2022-08-31 DIAGNOSIS — Z17.0 MALIGNANT NEOPLASM OF CENTRAL PORTION OF RIGHT BREAST IN FEMALE, ESTROGEN RECEPTOR POSITIVE: Chronic | ICD-10-CM

## 2022-08-31 LAB
ANISOCYTOSIS BLD QL: ABNORMAL
BASOPHILS # BLD AUTO: 0.02 10*3/MM3 (ref 0–0.2)
BASOPHILS NFR BLD AUTO: 0.1 % (ref 0–1.5)
DEPRECATED RDW RBC AUTO: 50.3 FL (ref 37–54)
DEPRECATED RDW RBC AUTO: 53.2 FL (ref 37–54)
EOSINOPHIL # BLD AUTO: 0.25 10*3/MM3 (ref 0–0.4)
EOSINOPHIL # BLD MANUAL: 0.24 10*3/MM3 (ref 0–0.4)
EOSINOPHIL NFR BLD AUTO: 1 % (ref 0.3–6.2)
EOSINOPHIL NFR BLD MANUAL: 1 % (ref 0.3–6.2)
ERYTHROCYTE [DISTWIDTH] IN BLOOD BY AUTOMATED COUNT: 17.1 % (ref 12.3–15.4)
ERYTHROCYTE [DISTWIDTH] IN BLOOD BY AUTOMATED COUNT: 17.2 % (ref 12.3–15.4)
ERYTHROCYTE [SEDIMENTATION RATE] IN BLOOD: 83 MM/HR (ref 0–30)
FOLATE SERPL-MCNC: 18.6 NG/ML (ref 4.78–24.2)
HAPTOGLOB SERPL-MCNC: 400 MG/DL (ref 30–200)
HCT VFR BLD AUTO: 36.8 % (ref 34–46.6)
HCT VFR BLD AUTO: 37 % (ref 34–46.6)
HGB BLD-MCNC: 11.2 G/DL (ref 12–15.9)
HGB BLD-MCNC: 11.4 G/DL (ref 12–15.9)
LYMPHOCYTES # BLD AUTO: 17.54 10*3/MM3 (ref 0.7–3.1)
LYMPHOCYTES # BLD MANUAL: 19.84 10*3/MM3 (ref 0.7–3.1)
LYMPHOCYTES NFR BLD AUTO: 69.6 % (ref 19.6–45.3)
LYMPHOCYTES NFR BLD MANUAL: 1 % (ref 5–12)
MCH RBC QN AUTO: 25.5 PG (ref 26.6–33)
MCH RBC QN AUTO: 26.6 PG (ref 26.6–33)
MCHC RBC AUTO-ENTMCNC: 30.4 G/DL (ref 31.5–35.7)
MCHC RBC AUTO-ENTMCNC: 30.8 G/DL (ref 31.5–35.7)
MCV RBC AUTO: 83.9 FL (ref 79–97)
MCV RBC AUTO: 86.4 FL (ref 79–97)
MONOCYTES # BLD AUTO: 0.92 10*3/MM3 (ref 0.1–0.9)
MONOCYTES # BLD: 0.24 10*3/MM3 (ref 0.1–0.9)
MONOCYTES NFR BLD AUTO: 3.6 % (ref 5–12)
NEUTROPHILS # BLD AUTO: 3.87 10*3/MM3 (ref 1.7–7)
NEUTROPHILS NFR BLD AUTO: 25.7 % (ref 42.7–76)
NEUTROPHILS NFR BLD AUTO: 6.48 10*3/MM3 (ref 1.7–7)
NEUTROPHILS NFR BLD MANUAL: 14 % (ref 42.7–76)
NEUTS BAND NFR BLD MANUAL: 2 % (ref 0–5)
PATHOLOGY REVIEW: YES
PLAT MORPH BLD: NORMAL
PLATELET # BLD AUTO: 205 10*3/MM3 (ref 140–450)
PLATELET # BLD AUTO: 286 10*3/MM3 (ref 140–450)
PMV BLD AUTO: 10.2 FL (ref 6–12)
PMV BLD AUTO: 11.8 FL (ref 6–12)
RBC # BLD AUTO: 4.28 10*6/MM3 (ref 3.77–5.28)
RBC # BLD AUTO: 4.38 10*6/MM3 (ref 3.77–5.28)
RETICS # AUTO: 0.08 10*6/MM3 (ref 0.02–0.13)
RETICS/RBC NFR AUTO: 1.77 % (ref 0.7–1.9)
SCAN SLIDE: NORMAL
VARIANT LYMPHS NFR BLD MANUAL: 82 % (ref 19.6–45.3)
WBC MORPH BLD: NORMAL
WBC NRBC COR # BLD: 24.2 10*3/MM3 (ref 3.4–10.8)
WBC NRBC COR # BLD: 25.21 10*3/MM3 (ref 3.4–10.8)

## 2022-08-31 PROCEDURE — 85652 RBC SED RATE AUTOMATED: CPT | Performed by: INTERNAL MEDICINE

## 2022-08-31 PROCEDURE — 85025 COMPLETE CBC W/AUTO DIFF WBC: CPT | Performed by: INTERNAL MEDICINE

## 2022-08-31 PROCEDURE — 84550 ASSAY OF BLOOD/URIC ACID: CPT | Performed by: INTERNAL MEDICINE

## 2022-08-31 PROCEDURE — 36415 COLL VENOUS BLD VENIPUNCTURE: CPT | Performed by: INTERNAL MEDICINE

## 2022-08-31 PROCEDURE — 85045 AUTOMATED RETICULOCYTE COUNT: CPT | Performed by: INTERNAL MEDICINE

## 2022-08-31 PROCEDURE — 85025 COMPLETE CBC W/AUTO DIFF WBC: CPT

## 2022-08-31 PROCEDURE — 84466 ASSAY OF TRANSFERRIN: CPT | Performed by: INTERNAL MEDICINE

## 2022-08-31 PROCEDURE — 80053 COMPREHEN METABOLIC PANEL: CPT | Performed by: INTERNAL MEDICINE

## 2022-08-31 PROCEDURE — 83540 ASSAY OF IRON: CPT | Performed by: INTERNAL MEDICINE

## 2022-08-31 PROCEDURE — 99205 OFFICE O/P NEW HI 60 MIN: CPT | Performed by: INTERNAL MEDICINE

## 2022-08-31 PROCEDURE — 83615 LACTATE (LD) (LDH) ENZYME: CPT | Performed by: INTERNAL MEDICINE

## 2022-08-31 PROCEDURE — 82746 ASSAY OF FOLIC ACID SERUM: CPT | Performed by: INTERNAL MEDICINE

## 2022-08-31 PROCEDURE — 83010 ASSAY OF HAPTOGLOBIN QUANT: CPT | Performed by: INTERNAL MEDICINE

## 2022-08-31 PROCEDURE — 85007 BL SMEAR W/DIFF WBC COUNT: CPT | Performed by: INTERNAL MEDICINE

## 2022-08-31 PROCEDURE — 82728 ASSAY OF FERRITIN: CPT | Performed by: INTERNAL MEDICINE

## 2022-08-31 RX ORDER — ANASTROZOLE 1 MG/1
1 TABLET ORAL DAILY
Qty: 90 TABLET | Refills: 3 | Status: SHIPPED | OUTPATIENT
Start: 2022-08-31

## 2022-09-01 LAB
ALBUMIN SERPL-MCNC: 4 G/DL (ref 3.5–5.2)
ALBUMIN/GLOB SERPL: 1.4 G/DL
ALP SERPL-CCNC: 159 U/L (ref 39–117)
ALT SERPL W P-5'-P-CCNC: 9 U/L (ref 1–33)
ANION GAP SERPL CALCULATED.3IONS-SCNC: 17 MMOL/L (ref 5–15)
AST SERPL-CCNC: 18 U/L (ref 1–32)
BILIRUB SERPL-MCNC: 0.2 MG/DL (ref 0–1.2)
BUN SERPL-MCNC: 26 MG/DL (ref 8–23)
BUN/CREAT SERPL: 18.2 (ref 7–25)
CALCIUM SPEC-SCNC: 10.1 MG/DL (ref 8.6–10.5)
CHLORIDE SERPL-SCNC: 105 MMOL/L (ref 98–107)
CO2 SERPL-SCNC: 20 MMOL/L (ref 22–29)
CREAT SERPL-MCNC: 1.43 MG/DL (ref 0.57–1)
EGFRCR SERPLBLD CKD-EPI 2021: 36.9 ML/MIN/1.73
FERRITIN SERPL-MCNC: 216.2 NG/ML (ref 13–150)
GLOBULIN UR ELPH-MCNC: 2.9 GM/DL
GLUCOSE SERPL-MCNC: 104 MG/DL (ref 65–99)
IRON 24H UR-MRATE: 50 MCG/DL (ref 37–145)
IRON SATN MFR SERPL: 11 % (ref 20–50)
LAB AP CASE REPORT: NORMAL
LDH SERPL-CCNC: 180 U/L (ref 135–214)
PATH REPORT.FINAL DX SPEC: NORMAL
POTASSIUM SERPL-SCNC: 5.5 MMOL/L (ref 3.5–5.2)
PROT SERPL-MCNC: 6.9 G/DL (ref 6–8.5)
SODIUM SERPL-SCNC: 142 MMOL/L (ref 136–145)
TIBC SERPL-MCNC: 440 MCG/DL (ref 298–536)
TRANSFERRIN SERPL-MCNC: 295 MG/DL (ref 200–360)
URATE SERPL-MCNC: 5 MG/DL (ref 2.4–5.7)

## 2022-09-02 ENCOUNTER — LAB (OUTPATIENT)
Dept: LAB | Facility: HOSPITAL | Age: 82
End: 2022-09-02

## 2022-09-02 ENCOUNTER — TELEPHONE (OUTPATIENT)
Dept: ONCOLOGY | Facility: CLINIC | Age: 82
End: 2022-09-02

## 2022-09-02 DIAGNOSIS — E87.5 HYPERKALEMIA: Primary | ICD-10-CM

## 2022-09-02 DIAGNOSIS — E87.5 HYPERKALEMIA: ICD-10-CM

## 2022-09-02 LAB
ABNORMAL WBC NFR SPEC FC: NORMAL %
ANION GAP SERPL CALCULATED.3IONS-SCNC: 13 MMOL/L (ref 5–15)
ANNOTATION COMMENT IMP: NORMAL
ASSESSMENT OF LEUKOCYTES: NORMAL
BUN SERPL-MCNC: 29 MG/DL (ref 8–23)
BUN/CREAT SERPL: 21.6 (ref 7–25)
CALCIUM SPEC-SCNC: 9.6 MG/DL (ref 8.6–10.5)
CHLORIDE SERPL-SCNC: 105 MMOL/L (ref 98–107)
CLINICAL INFO: NORMAL
CO2 SERPL-SCNC: 23 MMOL/L (ref 22–29)
CREAT SERPL-MCNC: 1.34 MG/DL (ref 0.57–1)
EGFRCR SERPLBLD CKD-EPI 2021: 39.9 ML/MIN/1.73
GATING STRATEGY: NORMAL
GLUCOSE SERPL-MCNC: 103 MG/DL (ref 65–99)
IMMUNOPHENOTYPING STUDY: NORMAL
LABORATORY COMMENT REPORT: NORMAL
PATH INTERP SPEC-IMP: NORMAL
PATHOLOGIST NAME: NORMAL
POTASSIUM SERPL-SCNC: 4.7 MMOL/L (ref 3.5–5.2)
SODIUM SERPL-SCNC: 141 MMOL/L (ref 136–145)
SPECIMEN SOURCE: NORMAL
VIABLE CELLS NFR SPEC: NORMAL %

## 2022-09-02 PROCEDURE — 36415 COLL VENOUS BLD VENIPUNCTURE: CPT

## 2022-09-02 PROCEDURE — 80048 BASIC METABOLIC PNL TOTAL CA: CPT

## 2022-09-02 RX ORDER — SODIUM POLYSTYRENE SULFONATE 15 G/60ML
15 SUSPENSION ORAL; RECTAL ONCE
Qty: 60 ML | Refills: 0 | Status: SHIPPED | OUTPATIENT
Start: 2022-09-02 | End: 2022-09-02

## 2022-09-02 NOTE — TELEPHONE ENCOUNTER
Left a voicemail with patients preferred pharmacy. Requested a callback ASAP to verify if they have Kayexalate in stock for the patient.

## 2022-09-02 NOTE — TELEPHONE ENCOUNTER
Attempted to contact the patient. Voicemail left requesting a callback. Direct phone number left on voicemail.

## 2022-09-02 NOTE — TELEPHONE ENCOUNTER
Received a callback from patients daughter Marianna. I informed Marianna that per Dr. Bolden, her mothers potassium level is high therefore she would like her to take Kayexalate for 1 dose then have a BMP drawn 4 hrs after taking the Kayexalate to verify if her mothers potassium level decreases. I informed Marianna that we will send the prescription to the pharmacy and I have confirmed with the pharmacy that they do have the medication in stock. I asked if she would like me to send the lab order to [facility] or if they would like to come to our office in [place]. She stated they can come to our office. She stated she is suppose to pick her mother up at 1130 for an appt for the COVID vaccine at 1210 therefore if the pharmacy has the medication ready, she will pick it up now then take it to her mother. I asked what time she will have the medication to her mother, she stated within the next 30 minutes. I confirmed and asked her if she is agreeable to me scheduling her mother for a lab appt today at 1550; she confirmed. I stated that 1550 should allow time for her to get the medication from the pharmacy then to her mother and her mother to take the medication by 1150 to ensure 4 hrs between taking the medication and the lab draw. Patient scheduled for labs at 1550 (4 hrs after Kayexalate). Appt date/time confirmed by Marianna. I advised her to contact our office if they have any questions or if they need anything, she confirmed.     Informed Marianna that Dr. Bolden also stated that her mother has renal insufficiency and that she will need to follow up with her PCP. Marianna confirmed.

## 2022-09-02 NOTE — TELEPHONE ENCOUNTER
----- Message from Ling Bolden MD sent at 9/1/2022  5:45 PM EDT -----  She has renal insufficiency therefore she needs to follow-up with her PCP.  Her potassium is also slightly high.  I do not see any potassium supplements on her med list.  Lets call in Kayexalate 15 g p.o. x1 dose.  Repeat BMP stat 4 hours after Kayexalate with results called.  She needs to follow-up with her primary care physician.

## 2022-09-02 NOTE — TELEPHONE ENCOUNTER
Received a call back from Sally with [facility]. I informed her that I am calling to verify if they have Kayexalate in stock before I send the prescription to them to ensure the patient is able to get the medication from their pharmacy. Sally stated they have 2 Kayexalate 15 grams available. I confirmed and stated I will send the prescription to them for the patient. She confirmed.

## 2022-09-07 NOTE — TELEPHONE ENCOUNTER
Please call patient and let her know that I have adjusted her dose of metformin to once a day because of her renal function.  Metformin is excreted through the kidneys and kidney disease can cause buildup of metformin in the body that can be dangerous.    Furthermore patient's last hemoglobin A1c was 6.9%.  Our goal at her age is less than 8%.  I do not think her diabetes will suffer as a result of the decreased dose of metformin.

## 2022-09-09 DIAGNOSIS — I13.10 HYPERTENSIVE HEART AND CHRONIC KIDNEY DISEASE STAGE 3: ICD-10-CM

## 2022-09-09 DIAGNOSIS — K21.9 HIATAL HERNIA WITH GERD: ICD-10-CM

## 2022-09-09 DIAGNOSIS — K44.9 HIATAL HERNIA WITH GERD: ICD-10-CM

## 2022-09-09 DIAGNOSIS — N18.30 HYPERTENSIVE HEART AND CHRONIC KIDNEY DISEASE STAGE 3: ICD-10-CM

## 2022-09-09 RX ORDER — PANTOPRAZOLE SODIUM 40 MG/1
40 TABLET, DELAYED RELEASE ORAL
Qty: 90 TABLET | Refills: 1 | Status: SHIPPED | OUTPATIENT
Start: 2022-09-09 | End: 2023-03-16 | Stop reason: SDUPTHER

## 2022-09-09 RX ORDER — CHLORTHALIDONE 25 MG/1
TABLET ORAL
Qty: 90 TABLET | Refills: 1 | Status: SHIPPED | OUTPATIENT
Start: 2022-09-09 | End: 2023-03-16 | Stop reason: SDUPTHER

## 2022-09-16 ENCOUNTER — HOSPITAL ENCOUNTER (OUTPATIENT)
Dept: CT IMAGING | Facility: HOSPITAL | Age: 82
Discharge: HOME | End: 2022-09-16

## 2022-09-16 ENCOUNTER — HOSPITAL ENCOUNTER (OUTPATIENT)
Dept: MAMMOGRAPHY | Facility: HOSPITAL | Age: 82
Discharge: HOME | End: 2022-09-16

## 2022-09-16 DIAGNOSIS — C91.10 CHRONIC LYMPHOID LEUKEMIA: ICD-10-CM

## 2022-09-16 DIAGNOSIS — Z12.31 ENCOUNTER FOR SCREENING MAMMOGRAM FOR MALIGNANT NEOPLASM OF BREAST: ICD-10-CM

## 2022-09-16 PROCEDURE — 77063 BREAST TOMOSYNTHESIS BI: CPT

## 2022-09-16 PROCEDURE — 71250 CT THORAX DX C-: CPT

## 2022-09-16 PROCEDURE — 74176 CT ABD & PELVIS W/O CONTRAST: CPT

## 2022-09-16 PROCEDURE — 77067 SCR MAMMO BI INCL CAD: CPT

## 2022-09-21 ENCOUNTER — LAB (OUTPATIENT)
Dept: LAB | Facility: HOSPITAL | Age: 82
End: 2022-09-21

## 2022-09-21 ENCOUNTER — OFFICE VISIT (OUTPATIENT)
Dept: ONCOLOGY | Facility: CLINIC | Age: 82
End: 2022-09-21

## 2022-09-21 VITALS
DIASTOLIC BLOOD PRESSURE: 80 MMHG | BODY MASS INDEX: 31.07 KG/M2 | RESPIRATION RATE: 18 BRPM | HEIGHT: 64 IN | HEART RATE: 85 BPM | SYSTOLIC BLOOD PRESSURE: 168 MMHG | WEIGHT: 182 LBS | TEMPERATURE: 97.3 F

## 2022-09-21 DIAGNOSIS — C91.10 CHRONIC LYMPHOID LEUKEMIA: Primary | ICD-10-CM

## 2022-09-21 DIAGNOSIS — C50.111 MALIGNANT NEOPLASM OF CENTRAL PORTION OF RIGHT BREAST IN FEMALE, ESTROGEN RECEPTOR POSITIVE: ICD-10-CM

## 2022-09-21 DIAGNOSIS — Z17.0 MALIGNANT NEOPLASM OF CENTRAL PORTION OF RIGHT BREAST IN FEMALE, ESTROGEN RECEPTOR POSITIVE: ICD-10-CM

## 2022-09-21 DIAGNOSIS — C91.10 CHRONIC LYMPHOID LEUKEMIA: ICD-10-CM

## 2022-09-21 LAB
BASOPHILS # BLD AUTO: 0.04 10*3/MM3 (ref 0–0.2)
BASOPHILS NFR BLD AUTO: 0.2 % (ref 0–1.5)
DEPRECATED RDW RBC AUTO: 57.7 FL (ref 37–54)
EOSINOPHIL # BLD AUTO: 0.33 10*3/MM3 (ref 0–0.4)
EOSINOPHIL NFR BLD AUTO: 1.5 % (ref 0.3–6.2)
ERYTHROCYTE [DISTWIDTH] IN BLOOD BY AUTOMATED COUNT: 18 % (ref 12.3–15.4)
HCT VFR BLD AUTO: 39.9 % (ref 34–46.6)
HGB BLD-MCNC: 12 G/DL (ref 12–15.9)
LYMPHOCYTES # BLD AUTO: 15.97 10*3/MM3 (ref 0.7–3.1)
LYMPHOCYTES NFR BLD AUTO: 70.7 % (ref 19.6–45.3)
MCH RBC QN AUTO: 26.9 PG (ref 26.6–33)
MCHC RBC AUTO-ENTMCNC: 30.1 G/DL (ref 31.5–35.7)
MCV RBC AUTO: 89.5 FL (ref 79–97)
MONOCYTES # BLD AUTO: 0.64 10*3/MM3 (ref 0.1–0.9)
MONOCYTES NFR BLD AUTO: 2.8 % (ref 5–12)
NEUTROPHILS NFR BLD AUTO: 24.8 % (ref 42.7–76)
NEUTROPHILS NFR BLD AUTO: 5.62 10*3/MM3 (ref 1.7–7)
PLATELET # BLD AUTO: 191 10*3/MM3 (ref 140–450)
PMV BLD AUTO: 12.2 FL (ref 6–12)
RBC # BLD AUTO: 4.46 10*6/MM3 (ref 3.77–5.28)
WBC NRBC COR # BLD: 22.6 10*3/MM3 (ref 3.4–10.8)

## 2022-09-21 PROCEDURE — 36415 COLL VENOUS BLD VENIPUNCTURE: CPT

## 2022-09-21 PROCEDURE — 85025 COMPLETE CBC W/AUTO DIFF WBC: CPT

## 2022-09-21 PROCEDURE — 99215 OFFICE O/P EST HI 40 MIN: CPT | Performed by: INTERNAL MEDICINE

## 2022-09-21 RX ORDER — SERTRALINE HYDROCHLORIDE 100 MG/1
TABLET, FILM COATED ORAL
COMMUNITY
Start: 2022-09-10 | End: 2022-11-04 | Stop reason: SDUPTHER

## 2022-09-21 RX ORDER — CHLORTHALIDONE 25 MG/1
TABLET ORAL
COMMUNITY
Start: 2022-09-09 | End: 2022-11-04 | Stop reason: SDUPTHER

## 2022-09-21 RX ORDER — PANTOPRAZOLE SODIUM 40 MG/1
TABLET, DELAYED RELEASE ORAL
COMMUNITY
Start: 2022-09-09 | End: 2022-11-04 | Stop reason: SDUPTHER

## 2022-09-21 NOTE — PROGRESS NOTES
Hematology/Oncology Outpatient Follow Up    PATIENT NAME:Diane Guan  :1940  MRN: [id number]  PRIMARY CARE PHYSICIAN: Jemima Fontaine MD  REFERRING PHYSICIAN: Jemima Fontaine MD    Chief Complaint   Patient presents with   • Follow-up     Chronic lymphoid leukemia (HCC)        HISTORY OF PRESENT ILLNESS:     This is an 82-year-old female with a history of stage II right breast cancer for which she underwent right mastectomy.  Tumor was invasive well-differentiated ductal carcinoma, estrogen receptor +100%, progesterone receptor +100% and HER2/juan luis was negative.  Patient has been on anastrozole since 2019 prescribed by Dr. Palmer.     Review of her records also suggest that she has a history of CLL diagnosed in 2017 for persistent leukocytosis.  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     Over the past several years she has had progressive decline in her functionality.  She sits or lays down more than half of the time at home.  She was able to complete her daily activities.  She has significant night sweats but no weight loss.  If anything she is beginning to gain weight.     She was last seen in our office in 2019        Recently she had a CBC done which showed persistent leukocytosis with white count ranging from 15-30,000, she has anemia with hemoglobin of 11.4, normal MCV and normal platelets.  She has differential lymphocytosis noted on her counts.  On her chemistry panel she has a creatinine that ranges between 0.9-1.34        Patient is  and lives with her spouse.  She is accompanied today by her daughter for this appointment.    · 2022 patient had left screening mammogram which was essentially  negative.  Follow-up in 1 year was recommended  · 9/16/2022 patient had CT scan of the chest, abdomen and pelvis there was no pathologically enlarged lymph nodes within the chest.  There was no pathologically enlarged lymph nodes within the abdomen or pelvis.  She has normal  spleen size  Past Medical History:   Diagnosis Date   • Acute bronchitis due to human metapneumovirus 02/08/2020   • Anxiety disorder    • Arthritis    • Breast cancer (HCC) 02/2019    Invasive ductal carcinoma   • Chronic lymphocytic leukemia (CLL), B-cell (HCC) 01/2019   • Depression    • Disease of thyroid gland    • Diverticulosis of colon 2018    Identified on colonoscopy   • Dysphagia 12/2020   • Dyspnea on exertion    • Hemorrhoid    • Hiatal hernia 12/2020   • Hiatal hernia with GERD     large hiatal hernia with high-grade reflux on barium swallow; s/p laparoscopic fundoplication with gastropexy   • History of diabetes mellitus     no meds now   • Hyperlipidemia    • Hypertension    • Mycobacterium avium complex (HCC) 02/2019    aspiration pneumonia; completed abx therapy   • OAB (overactive bladder)    • Sleep apnea     daughter states pt does not have and doesn't have machine       Past Surgical History:   Procedure Laterality Date   • BLADDER SURGERY     • BREAST BIOPSY     • BRONCHOSCOPY N/A 09/13/2019    Procedure: BRONCHOSCOPY with bronchial washing;  Surgeon: Marnie Frankel MD;  Location: [facility] ENDOSCOPY;  Service: Pulmonary   • COLONOSCOPY  07/19/2018    severe diverticulosis   • CYST REMOVAL      Removed a fatty cyst off of her back   • ENDOSCOPY N/A 11/23/2020    Procedure: ESOPHAGOGASTRODUODENOSCOPY with dilatation (Bougie # 48, 50, 52, 54, 56, 58);  Surgeon: Den Plummer DO;  Location: [facility] ENDOSCOPY;  Service: General;  Laterality: N/A;  Post: large hiatal hernia, joshua's ulcers   • HIATAL HERNIA REPAIR N/A 12/30/2020    Procedure: Laparoscopic hiatal hernia repair with gastropexy;  Surgeon: Den Plummer DO;   Location: [facility] MAIN OR;  Service: General;  Laterality: N/A;   • MASTECTOMY Right 02/04/2019    Invasive ductal carcinoma   • TUBAL ABDOMINAL LIGATION           Current Outpatient Medications:   •  Accu-Chek Softclix Lancets lancets, 1 each by Other route Daily. Use as instructed, Disp: 100 each, Rfl: 0  •  Alcohol Swabs ([employer] Alcohol Prep Pads) 70 % pads, APPLY 1 EACH TOPICALLY DAILY., Disp: , Rfl:   •  amLODIPine (NORVASC) 10 MG tablet, Take 1 tablet by mouth Daily., Disp: 90 tablet, Rfl: 1  •  anastrozole (ARIMIDEX) 1 MG tablet, Take 1 tablet by mouth Daily., Disp: 90 tablet, Rfl: 3  •  Budeson-Glycopyrrol-Formoterol (Breztri Aerosphere) 160-9-4.8 MCG/ACT aerosol inhaler, Inhale 2 puffs 2 (Two) Times a Day. Rinse mouth after each use!, Disp: 10.7 g, Rfl: 5  •  cetirizine (zyrTEC) 10 MG tablet, Take 10 mg by mouth every night at bedtime., Disp: , Rfl:   •  chlorthalidone (HYGROTON) 25 MG tablet, TAKE 1 TABLET BY MOUTH EVERY DAY, Disp: 90 tablet, Rfl: 1  •  chlorthalidone (HYGROTON) 25 MG tablet, , Disp: , Rfl:   •  Cyanocobalamin (HM Super Vitamin B12) 2500 MCG chewable tablet, Chew., Disp: , Rfl:   •  docusate sodium (COLACE) 100 MG capsule, Take 100 mg by mouth 2 (Two) Times a Day., Disp: , Rfl: 3  •  ferrous sulfate 324 (65 Fe) MG tablet delayed-release EC tablet, Take 324 mg by mouth 3 (Three) Times a Week., Disp: , Rfl:   •  glucose blood (Accu-Chek Guide) test strip, 1 each by Other route Daily. Use as instructed, Disp: 100 each, Rfl: 0  •  Hearing Aid Batteries misc, 1 Units Every 7 (Seven) Days., Disp: 52 each, Rfl: 0  •  ipratropium-albuterol (DUO-NEB) 0.5-2.5 mg/3 ml nebulizer, Take 3 mL by nebulization Every 6 (Six) Hours As Needed for Wheezing or Shortness of Air., Disp: 360 mL, Rfl: 3  •  irbesartan (Avapro) 300 MG tablet, Take 1 tablet by mouth Daily., Disp: 90 tablet, Rfl: 1  •  Isopropyl Alcohol (Alcohol Wipes) 70 % misc, Apply 1 each topically Daily., Disp: 100 each, Rfl: 0  •  levothyroxine  (SYNTHROID, LEVOTHROID) 100 MCG tablet, TAKE 1 TABLET (100 MCG) BY MOUTH MONDAY THROUGH SATURDAY. TAKE 2 TABLETS (200 MCG) ON ., Disp: 90 tablet, Rfl: 0  •  metFORMIN (GLUCOPHAGE) 500 MG tablet, Take 1 tablet by mouth Daily With Breakfast., Disp: 90 tablet, Rfl: 1  •  metFORMIN (GLUCOPHAGE) 500 MG tablet, , Disp: , Rfl:   •  metoclopramide (REGLAN) 5 MG tablet, Take 1 tablet by mouth 3 (Three) Times a Day As Needed (for nausea and vomiting)., Disp: 270 tablet, Rfl: 1  •  mirtazapine (REMERON) 15 MG tablet, Take 1 tablet by mouth every night at bedtime., Disp: 90 tablet, Rfl: 1  •  multivitamins-minerals (PRESERVISION AREDS 2) capsule capsule, Take 1 capsule by mouth 2 (Two) Times a Day., Disp: , Rfl:   •  pantoprazole (PROTONIX) 40 MG EC tablet, TAKE 1 TABLET BY MOUTH EVERY MORNING BEFORE BREAKFAST. TAKE ON AN EMPTY STOMACH!, Disp: 90 tablet, Rfl: 1  •  pantoprazole (PROTONIX) 40 MG EC tablet, , Disp: , Rfl:   •  rOPINIRole (REQUIP) 0.25 MG tablet, Take 1 tablet by mouth Every Night. Take 1 hour before bedtime., Disp: 90 tablet, Rfl: 1  •  rosuvastatin (CRESTOR) 10 MG tablet, Take 1 tablet by mouth Every Night., Disp: 90 tablet, Rfl: 1  •  sertraline (ZOLOFT) 100 MG tablet, , Disp: , Rfl:   •  Sertraline HCl 150 MG capsule, Take  by mouth. 1 1/2  tablet at bedtime., Disp: , Rfl:     No Known Allergies    Family History   Problem Relation Age of Onset   • Diabetes Mother    • Arthritis Other    • Heart disease Other        Cancer-related family history is not on file.    Social History     Tobacco Use   • Smoking status: Former Smoker     Packs/day: 0.50     Years: 2.00     Pack years: 1.00     Types: Cigarettes     Start date:      Quit date:      Years since quittin.7   • Smokeless tobacco: Never Used   Vaping Use   • Vaping Use: Never used   Substance Use Topics   • Alcohol use: No   • Drug use: No     I have reviewed and confirmed the accuracy of the patient's history: Chief complaint, HPI, ROS  "and Subjective as entered by the MA/LPN/RN. Ling Bolden MD 09/21/22       SUBJECTIVE:    She is here to review her reports.  She no longer has significant fatigue.  She has improved activity level          REVIEW OF SYSTEMS:  Review of Systems   Constitutional: Positive for fatigue.        Her fatigue is improving.       OBJECTIVE:    Vitals:    09/21/22 1520   BP: 168/80   Pulse: 85   Resp: 18   Temp: 97.3 °F (36.3 °C)   TempSrc: Infrared   Weight: 82.6 kg (182 lb)   Height: 162.6 cm (64\")   PainSc: 0-No pain     Body mass index is 31.24 kg/m².    ECOG    (1) Restricted in physically strenuous activity, ambulatory and able to do work of light nature    Physical Exam  Vitals and nursing note reviewed.   Constitutional:       General: She is not in acute distress.     Appearance: She is not diaphoretic.   HENT:      Head: Normocephalic and atraumatic.   Eyes:      General: No scleral icterus.        Right eye: No discharge.         Left eye: No discharge.      Conjunctiva/sclera: Conjunctivae normal.   Neck:      Thyroid: No thyromegaly.   Cardiovascular:      Rate and Rhythm: Normal rate and regular rhythm.      Heart sounds: Normal heart sounds.     No friction rub. No gallop.   Pulmonary:      Effort: Pulmonary effort is normal. No respiratory distress.      Breath sounds: No stridor. No wheezing.   Abdominal:      General: Bowel sounds are normal.      Palpations: Abdomen is soft. There is no mass.      Tenderness: There is no abdominal tenderness. There is no guarding or rebound.   Musculoskeletal:         General: No tenderness. Normal range of motion.      Cervical back: Normal range of motion and neck supple.   Lymphadenopathy:      Cervical: No cervical adenopathy.   Skin:     General: Skin is warm.      Findings: No erythema or rash.   Neurological:      Mental Status: She is alert and oriented to person, place, and time.      Motor: No abnormal muscle tone.   Psychiatric:         Behavior: " Behavior normal.         RECENT LABS    WBC   Date Value Ref Range Status   09/21/2022 22.60 (H) 3.40 - 10.80 10*3/mm3 Final     RBC   Date Value Ref Range Status   09/21/2022 4.46 3.77 - 5.28 10*6/mm3 Final     Hemoglobin   Date Value Ref Range Status   09/21/2022 12.0 12.0 - 15.9 g/dL Final     Hematocrit   Date Value Ref Range Status   09/21/2022 39.9 34.0 - 46.6 % Final     MCV   Date Value Ref Range Status   09/21/2022 89.5 79.0 - 97.0 fL Final     MCH   Date Value Ref Range Status   09/21/2022 26.9 26.6 - 33.0 pg Final     MCHC   Date Value Ref Range Status   09/21/2022 30.1 (L) 31.5 - 35.7 g/dL Final     RDW   Date Value Ref Range Status   09/21/2022 18.0 (H) 12.3 - 15.4 % Final     RDW-SD   Date Value Ref Range Status   09/21/2022 57.7 (H) 37.0 - 54.0 fl Final     MPV   Date Value Ref Range Status   09/21/2022 12.2 (H) 6.0 - 12.0 fL Final     Platelets   Date Value Ref Range Status   09/21/2022 191 140 - 450 10*3/mm3 Final     Neutrophil %   Date Value Ref Range Status   09/21/2022 24.8 (L) 42.7 - 76.0 % Final     Lymphocyte %   Date Value Ref Range Status   09/21/2022 70.7 (H) 19.6 - 45.3 % Final     Monocyte %   Date Value Ref Range Status   09/21/2022 2.8 (L) 5.0 - 12.0 % Final     Eosinophil %   Date Value Ref Range Status   09/21/2022 1.5 0.3 - 6.2 % Final     Basophil %   Date Value Ref Range Status   09/21/2022 0.2 0.0 - 1.5 % Final     Neutrophils, Absolute   Date Value Ref Range Status   09/21/2022 5.62 1.70 - 7.00 10*3/mm3 Final     Lymphocytes, Absolute   Date Value Ref Range Status   09/21/2022 15.97 (H) 0.70 - 3.10 10*3/mm3 Final     Monocytes, Absolute   Date Value Ref Range Status   09/21/2022 0.64 0.10 - 0.90 10*3/mm3 Final     Eosinophils, Absolute   Date Value Ref Range Status   09/21/2022 0.33 0.00 - 0.40 10*3/mm3 Final     Basophils, Absolute   Date Value Ref Range Status   09/21/2022 0.04 0.00 - 0.20 10*3/mm3 Final     nRBC   Date Value Ref Range Status   03/15/2019 0 0 /100[WBCs] Final        Lab Results   Component Value Date    GLUCOSE 103 (H) 09/02/2022    BUN 29 (H) 09/02/2022    CREATININE 1.34 (H) 09/02/2022    EGFRIFNONA 38 (L) 10/28/2021    EGFRIFAFRI 59 (L) 04/27/2017    BCR 21.6 09/02/2022    K 4.7 09/02/2022    CO2 23.0 09/02/2022    CALCIUM 9.6 09/02/2022    ALBUMIN 4.00 08/31/2022    LABIL2 1.0 03/17/2019    AST 18 08/31/2022    ALT 9 08/31/2022         Assessment & Plan     Chronic lymphoid leukemia (HCC)  - CBC & Differential  - CBC & Differential  - Comprehensive Metabolic Panel  - Lactate Dehydrogenase  - Uric Acid    Malignant neoplasm of central portion of right breast in female, estrogen receptor positive (HCC)  - CBC & Differential  - CBC & Differential  - Comprehensive Metabolic Panel  - Lactate Dehydrogenase  - Uric Acid      ASSESSMENT:      Chronic lymphoid leukemia (HCC)          · Chronic lymphocytic leukemia clinical stage 0 diagnosed in 2017 on peripheral blood flow cytometry.  This was ordered due to persistent leukocytosis with differential lymphocytosis patient had CT scans which did not show any significant lymphadenopathy at the time and therefore she was observed.  Follow-up CT scans completed September 2022 shows no evidence of progressive lymphadenopathy.  For that reason observation will be continued   · She presents today with significant fatigue, progressive night sweats.  These have improved since last visit  · Possible dysphagia: CT scan suggest retained food products in the esophagus.  Recommend GI evaluation  · Clinical Stage II right breast cancer: Status post right mastectomy.  Invasive well-differentiated ductal carcinoma of the right breast.  ER positive, DE positive and HER2/juan luis negative .  Patient is currently on Arimidex initiated in 2019.  Last mammogram was in April 2021 and last DEXA scan was 4/14/2021 and was  normal  · History of iron deficiency anemia.  Her hemoglobin has normalized and her work-up for the anemia was unremarkable  · Lesion  involving the left medial malleolus worrisome for skin malignancy.  Patient is scheduled with dermatologist.  This appointment is coming up soon  · COPD         Discussion        CLL, we discussed that indications for treatment would include but not limited to, pancytopenia, systemic symptoms including fatigue, night sweats and weight loss.  Other indications treatment will include recurrent infections, organ dysfunction due to massive lymphadenopathy, doubling  of leukocytosis in less than 6 months.  We discussed that CLL is not curable but can respond to chemo immunotherapy and some molecular targeted agents which can result in remissions but this disease is faulted by multiple relapses over the course of time.  We also discussed that median survivorship with low-grade lymphoma in general is estimated anywhere from 10-12 years.  I also explained that this disease is heterogeneous.  Some patients may succumb to the illness within a few days following diagnosis while some may have it for decades.  We discussed that lymphoproliferative diseases in general can affect the immune system so there is risk of  infections.  When the bone marrow is heavily involved there is risk of pancytopenia  which can result in blood, platelet transfusions and also predisposition to infections if the white count is affected.  There is also a risk of transformation to high-grade lymphoma which can result in significant symptoms and would require systemic chemo immunotherapy.  The risk of transformation varies with different types of lymphoma.  Also is not dependent on prior treatment or worsen by conservative management.           Plans:     · There is no evidence of CLL progression at this time  · Recommend referral to GI for possible esophageal motility issues.  Retained food products on her recent CT scan in the esophagus  · Follow-up with PCP for her renal insufficiency  · Continue Arimidex 1 mg p.o. daily #90 with refills  · Her next  mammogram will be due in September 2023, left diagnostic  · DEXA scan will be due again in 4/14/2023  · Continue calcium and vitamin D 600 mg twice a day  · Follow-up again in 4 months  · She has been encouraged to have her COVID vaccinations completed as well as flu vaccine  · Patient to return to clinic earlier as needed  · All questions answered     Patient verbalized understanding and is in agreement of the above plan.        I spent 40 total minutes, face-to-face, caring for Diane today.  90% of this time involved counseling and/or coordination of care as documented within this note, reviewing her labs, imaging studies and formulating plans today.

## 2022-09-30 DIAGNOSIS — I13.10 HYPERTENSIVE HEART AND CHRONIC KIDNEY DISEASE STAGE 3: ICD-10-CM

## 2022-09-30 DIAGNOSIS — N18.32 TYPE 2 DIABETES MELLITUS WITH STAGE 3B CHRONIC KIDNEY DISEASE, WITHOUT LONG-TERM CURRENT USE OF INSULIN: ICD-10-CM

## 2022-09-30 DIAGNOSIS — N18.30 HYPERTENSIVE HEART AND CHRONIC KIDNEY DISEASE STAGE 3: ICD-10-CM

## 2022-09-30 DIAGNOSIS — G25.81 RESTLESS LEG SYNDROME: ICD-10-CM

## 2022-09-30 DIAGNOSIS — E11.22 TYPE 2 DIABETES MELLITUS WITH STAGE 3B CHRONIC KIDNEY DISEASE, WITHOUT LONG-TERM CURRENT USE OF INSULIN: ICD-10-CM

## 2022-09-30 RX ORDER — IRBESARTAN 300 MG/1
TABLET ORAL
Qty: 90 TABLET | Refills: 1 | Status: SHIPPED | OUTPATIENT
Start: 2022-09-30 | End: 2023-03-16 | Stop reason: SDUPTHER

## 2022-09-30 RX ORDER — ROPINIROLE 0.25 MG/1
0.25 TABLET, FILM COATED ORAL NIGHTLY
Qty: 90 TABLET | Refills: 1 | Status: SHIPPED | OUTPATIENT
Start: 2022-09-30

## 2022-10-17 DIAGNOSIS — E78.2 MIXED HYPERLIPIDEMIA: ICD-10-CM

## 2022-10-17 DIAGNOSIS — F41.8 MIXED ANXIETY AND DEPRESSIVE DISORDER: ICD-10-CM

## 2022-10-17 DIAGNOSIS — I13.10 HYPERTENSIVE HEART AND CHRONIC KIDNEY DISEASE STAGE 3: ICD-10-CM

## 2022-10-17 DIAGNOSIS — N18.30 HYPERTENSIVE HEART AND CHRONIC KIDNEY DISEASE STAGE 3: ICD-10-CM

## 2022-10-17 RX ORDER — MIRTAZAPINE 15 MG/1
15 TABLET, FILM COATED ORAL
Qty: 90 TABLET | Refills: 1 | Status: SHIPPED | OUTPATIENT
Start: 2022-10-17 | End: 2023-03-16 | Stop reason: SDUPTHER

## 2022-10-17 RX ORDER — AMLODIPINE BESYLATE 10 MG/1
10 TABLET ORAL DAILY
Qty: 90 TABLET | Refills: 1 | Status: SHIPPED | OUTPATIENT
Start: 2022-10-17 | End: 2023-03-16 | Stop reason: SDUPTHER

## 2022-10-17 RX ORDER — ROSUVASTATIN CALCIUM 10 MG/1
10 TABLET, COATED ORAL NIGHTLY
Qty: 90 TABLET | Refills: 1 | Status: SHIPPED | OUTPATIENT
Start: 2022-10-17

## 2022-11-03 DIAGNOSIS — R93.89 ABNORMAL CT SCAN: Primary | ICD-10-CM

## 2022-11-04 ENCOUNTER — OFFICE VISIT (OUTPATIENT)
Dept: FAMILY MEDICINE CLINIC | Facility: CLINIC | Age: 82
End: 2022-11-04

## 2022-11-04 VITALS
HEART RATE: 77 BPM | DIASTOLIC BLOOD PRESSURE: 79 MMHG | SYSTOLIC BLOOD PRESSURE: 147 MMHG | WEIGHT: 185 LBS | HEIGHT: 64 IN | BODY MASS INDEX: 31.58 KG/M2 | OXYGEN SATURATION: 92 %

## 2022-11-04 DIAGNOSIS — Z00.00 MEDICARE ANNUAL WELLNESS VISIT, SUBSEQUENT: Primary | ICD-10-CM

## 2022-11-04 DIAGNOSIS — I10 ESSENTIAL HYPERTENSION: ICD-10-CM

## 2022-11-04 DIAGNOSIS — E03.9 HYPOTHYROIDISM (ACQUIRED): ICD-10-CM

## 2022-11-04 DIAGNOSIS — K21.9 HIATAL HERNIA WITH GERD: ICD-10-CM

## 2022-11-04 DIAGNOSIS — Z23 IMMUNIZATION DUE: ICD-10-CM

## 2022-11-04 DIAGNOSIS — K44.9 HIATAL HERNIA WITH GERD: ICD-10-CM

## 2022-11-04 DIAGNOSIS — E11.65 TYPE 2 DIABETES MELLITUS WITH HYPERGLYCEMIA, WITHOUT LONG-TERM CURRENT USE OF INSULIN: ICD-10-CM

## 2022-11-04 DIAGNOSIS — F33.0 MAJOR DEPRESSIVE DISORDER, RECURRENT, MILD: ICD-10-CM

## 2022-11-04 DIAGNOSIS — J44.9 OBSTRUCTIVE CHRONIC BRONCHITIS WITHOUT EXACERBATION: ICD-10-CM

## 2022-11-04 DIAGNOSIS — G25.81 RLS (RESTLESS LEGS SYNDROME): ICD-10-CM

## 2022-11-04 DIAGNOSIS — E78.2 MIXED HYPERLIPIDEMIA: ICD-10-CM

## 2022-11-04 PROCEDURE — G0008 ADMIN INFLUENZA VIRUS VAC: HCPCS | Performed by: NURSE PRACTITIONER

## 2022-11-04 PROCEDURE — G0439 PPPS, SUBSEQ VISIT: HCPCS | Performed by: NURSE PRACTITIONER

## 2022-11-04 PROCEDURE — 1159F MED LIST DOCD IN RCRD: CPT | Performed by: NURSE PRACTITIONER

## 2022-11-04 PROCEDURE — 1170F FXNL STATUS ASSESSED: CPT | Performed by: NURSE PRACTITIONER

## 2022-11-04 PROCEDURE — 90662 IIV NO PRSV INCREASED AG IM: CPT | Performed by: NURSE PRACTITIONER

## 2022-11-04 PROCEDURE — 96160 PT-FOCUSED HLTH RISK ASSMT: CPT | Performed by: NURSE PRACTITIONER

## 2022-11-04 RX ORDER — METOCLOPRAMIDE 5 MG/1
5 TABLET ORAL 3 TIMES DAILY PRN
Qty: 270 TABLET | Refills: 3 | Status: SHIPPED | OUTPATIENT
Start: 2022-11-04

## 2022-11-04 NOTE — PROGRESS NOTES
"The ABCs of the Annual Wellness Visit  Subsequent Medicare Wellness Visit    Chief Complaint   Patient presents with   • Medicare Wellness-subsequent      Subjective    History of Present Illness:  Diane Guan is a 82 y.o. female who presents for a Subsequent Medicare Wellness Visit.  MMSE score is 26/30 today.  Mammogram 8/2022.   Colonoscopy 7/2018.   DEXA scan 4/2021  Immunizations reviewed.    HTN- currently on amlodipine 10 mg daily; chlorthalidone 25 mg daily; irbesartan 300 mg daily. 's/70's at home.  Denies any Cp; palpitations, SOA, dizziness, headache, trouble with vision.  T2DM -  Currently on metformin 500 mg daily.  A1C in August was 6.9%.  's at home.  Denies any polyuria or polydipsia.  Denies any numbness/tingling in extremities.   Hyperlipidemia - currently on rosuvastatin 10 mg daily.   Hypothyroidism - currently on levothyroxine 100 mcg daily. TSH was 2.52 in August.   COPD- stopped using the Breztri sample; reports it made her \"sick\".  She says any inhaler she has tried makes her sick.  She uses Duo-Nebs as needed.  Denies any worsening cough; denies any wheezes or SOA.    GERD - currently on pantoprazole 40 mg daily; metoclopramide 5 mg tid.  Feels symptoms are stable.    Depression - currently on sertraline 150 mg daily; mirtazapine 15 mg at bedtime.  Moods stable.    RLS - currently on ropinirole 0.25 mg at bedtime.       The following portions of the patient's history were reviewed and   updated as appropriate: allergies, current medications, past family history, past medical history, past social history, past surgical history and problem list    Compared to one year ago, the patient feels her physical   health is the same.    Compared to one year ago, the patient feels her mental   health is the same.    Recent Hospitalizations:  She was not admitted to the hospital during the last year.       Current Medical Providers:  Patient Care Team:  Asia Queen APRN as PCP - " General (Nurse Practitioner)  Asia Queen APRN as Nurse Practitioner (Nurse Practitioner)    Outpatient Medications Prior to Visit   Medication Sig Dispense Refill   • Accu-Chek Softclix Lancets lancets 1 each by Other route Daily. Use as instructed 100 each 0   • Alcohol Swabs ([employer] Alcohol Prep Pads) 70 % pads APPLY 1 EACH TOPICALLY DAILY.     • amLODIPine (NORVASC) 10 MG tablet Take 1 tablet by mouth Daily. 90 tablet 1   • anastrozole (ARIMIDEX) 1 MG tablet Take 1 tablet by mouth Daily. 90 tablet 3   • cetirizine (zyrTEC) 10 MG tablet Take 10 mg by mouth every night at bedtime.     • chlorthalidone (HYGROTON) 25 MG tablet TAKE 1 TABLET BY MOUTH EVERY DAY 90 tablet 1   • Cyanocobalamin (HM Super Vitamin B12) 2500 MCG chewable tablet Chew.     • docusate sodium (COLACE) 100 MG capsule Take 100 mg by mouth 2 (Two) Times a Day.  3   • ferrous sulfate 324 (65 Fe) MG tablet delayed-release EC tablet Take 324 mg by mouth 3 (Three) Times a Week.     • glucose blood (Accu-Chek Guide) test strip 1 each by Other route Daily. Use as instructed 100 each 0   • Hearing Aid Batteries misc 1 Units Every 7 (Seven) Days. 52 each 0   • ipratropium-albuterol (DUO-NEB) 0.5-2.5 mg/3 ml nebulizer Take 3 mL by nebulization Every 6 (Six) Hours As Needed for Wheezing or Shortness of Air. 360 mL 3   • irbesartan (AVAPRO) 300 MG tablet TAKE 1 TABLET BY MOUTH EVERY DAY 90 tablet 1   • Isopropyl Alcohol (Alcohol Wipes) 70 % misc Apply 1 each topically Daily. 100 each 0   • levothyroxine (SYNTHROID, LEVOTHROID) 100 MCG tablet TAKE 1 TABLET (100 MCG) BY MOUTH MONDAY THROUGH SATURDAY. TAKE 2 TABLETS (200 MCG) ON SUNDAY. 90 tablet 0   • metFORMIN (GLUCOPHAGE) 500 MG tablet Take 1 tablet by mouth Daily With Breakfast. 90 tablet 1   • mirtazapine (REMERON) 15 MG tablet Take 1 tablet by mouth every night at bedtime. 90 tablet 1   • multivitamins-minerals (PRESERVISION AREDS 2) capsule capsule Take 1 capsule by mouth 2 (Two) Times a Day.     •  pantoprazole (PROTONIX) 40 MG EC tablet TAKE 1 TABLET BY MOUTH EVERY MORNING BEFORE BREAKFAST. TAKE ON AN EMPTY STOMACH! 90 tablet 1   • rOPINIRole (REQUIP) 0.25 MG tablet TAKE 1 TABLET BY MOUTH EVERY NIGHT. TAKE 1 HOUR BEFORE BEDTIME. 90 tablet 1   • rosuvastatin (CRESTOR) 10 MG tablet Take 1 tablet by mouth Every Night. 90 tablet 1   • Sertraline HCl 150 MG capsule Take  by mouth. 1 1/2  tablet at bedtime.     • Budeson-Glycopyrrol-Formoterol (Breztri Aerosphere) 160-9-4.8 MCG/ACT aerosol inhaler Inhale 2 puffs 2 (Two) Times a Day. Rinse mouth after each use! 10.7 g 5   • chlorthalidone (HYGROTON) 25 MG tablet      • metFORMIN (GLUCOPHAGE) 500 MG tablet      • metoclopramide (REGLAN) 5 MG tablet Take 1 tablet by mouth 3 (Three) Times a Day As Needed (for nausea and vomiting). 270 tablet 1   • pantoprazole (PROTONIX) 40 MG EC tablet      • sertraline (ZOLOFT) 100 MG tablet        No facility-administered medications prior to visit.       No opioid medication identified on active medication list. I have reviewed chart for other potential  high risk medication/s and harmful drug interactions in the elderly.          Aspirin is not on active medication list.  Aspirin use is not indicated based on review of current medical condition/s. Risk of harm outweighs potential benefits.  .    Patient Active Problem List   Diagnosis   • Abnormality of gait   • Mixed anxiety and depressive disorder   • Primary osteoarthritis involving multiple joints   • Breast cancer (HCC)   • Chronic lymphoid leukemia (HCC)   • Depression   • Gallbladder disorder   • Hiatal hernia with gastroesophageal reflux   • Mixed hyperlipidemia   • 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 (HCC)   • Acquired hypothyroidism   • Type 2 diabetes mellitus with stage 3b chronic kidney disease, without long-term current use of insulin ([facility])   • Insomnia   • Postmenopausal status   • Shoulder pain   • Overactive bladder   • Vitamin B 12 deficiency  "  • Seasonal allergies   • Absolute anemia   • Vitamin D deficiency   • LIBBY (obstructive sleep apnea)   • Nocturnal hypoxia   • Acute respiratory failure with hypoxia and hypercapnia ([facility])   • Abdominal pain   • COPD (chronic obstructive pulmonary disease) (HCC)   • CKD (chronic kidney disease) stage 3, GFR 30-59 ml/min ([facility])   • Sepsis due to pneumonia ([facility])   • Acute on chronic respiratory failure with hypoxemia ([facility])   • Class 1 obesity due to excess calories with serious comorbidity and body mass index (BMI) of 32.0 to 32.9 in adult     Advance Care Planning  Advance Directive is not on file.  ACP discussion was held with the patient during this visit. Patient does not have an advance directive, declines further assistance.    Review of Systems   Constitutional: Negative for chills, fatigue and fever.   Eyes: Negative for visual disturbance.   Respiratory: Negative for cough, chest tightness, shortness of breath and wheezing.    Cardiovascular: Negative for chest pain, palpitations and leg swelling.   Gastrointestinal: Negative for abdominal pain, blood in stool, constipation, diarrhea, nausea and vomiting.   Endocrine: Negative for cold intolerance, heat intolerance, polydipsia, polyphagia and polyuria.   Genitourinary: Negative for dysuria, flank pain, frequency, hematuria and urgency.   Musculoskeletal: Negative for arthralgias and myalgias.   Neurological: Negative for dizziness and weakness.   Psychiatric/Behavioral: Negative for dysphoric mood. The patient is not nervous/anxious.         Objective    Vitals:    11/04/22 0806   BP: 147/79   BP Location: Left arm   Patient Position: Sitting   Cuff Size: Large Adult   Pulse: 77   SpO2: 92%   Weight: 83.9 kg (185 lb)   Height: 162.6 cm (64\")     Estimated body mass index is 31.76 kg/m² as calculated from the following:    Height as of this encounter: 162.6 cm (64\").    Weight as of this encounter: 83.9 kg (185 lb).    BMI is >= 30 and <35. (Class 1 Obesity). " The following options were offered after discussion;: exercise counseling/recommendations and nutrition counseling/recommendations      Does the patient have evidence of cognitive impairment? Yes    Physical Exam  Vitals reviewed.   Constitutional:       General: She is not in acute distress.     Appearance: Normal appearance. She is obese.   Cardiovascular:      Rate and Rhythm: Normal rate and regular rhythm.      Pulses: Normal pulses.      Heart sounds: Normal heart sounds. No murmur heard.  Pulmonary:      Effort: Pulmonary effort is normal. No respiratory distress.      Breath sounds: Normal breath sounds. No wheezing or rhonchi.   Chest:      Chest wall: No tenderness.   Abdominal:      Tenderness: There is no right CVA tenderness or left CVA tenderness.   Musculoskeletal:      Cervical back: Normal range of motion and neck supple. No tenderness.      Right lower leg: No edema.      Left lower leg: No edema.   Skin:     General: Skin is warm and dry.      Findings: No erythema.   Neurological:      Mental Status: She is alert and oriented to person, place, and time. Mental status is at baseline.   Psychiatric:         Mood and Affect: Mood normal.       Lab Results   Component Value Date    TRIG 248 (H) 2022    HDL 34 (L) 2022    LDL 98 2022    VLDL 42 (H) 2022    HGBA1C 6.9 (H) 2022            HEALTH RISK ASSESSMENT    Smoking Status:  Social History     Tobacco Use   Smoking Status Former   • Packs/day: 0.50   • Years: 2.00   • Pack years: 1.00   • Types: Cigarettes   • Start date:    • Quit date:    • Years since quittin.8   Smokeless Tobacco Never     Alcohol Consumption:  Social History     Substance and Sexual Activity   Alcohol Use No     Fall Risk Screen:    STEADI Fall Risk Assessment was completed, and patient is at HIGH risk for falls. Assessment completed on:2022    Depression Screening:  PHQ-2/PHQ-9 Depression Screening 2022   Retired PHQ-9  Total Score -   Retired Total Score -   Little Interest or Pleasure in Doing Things 0-->not at all   Feeling Down, Depressed or Hopeless 0-->not at all   PHQ-9: Brief Depression Severity Measure Score 0       Health Habits and Functional and Cognitive Screening:  Functional & Cognitive Status 11/4/2022   Do you have difficulty preparing food and eating? No   Do you have difficulty bathing yourself, getting dressed or grooming yourself? No   Do you have difficulty using the toilet? No   Do you have difficulty moving around from place to place? No   Do you have trouble with steps or getting out of a bed or a chair? Yes   Current Diet Well Balanced Diet   Dental Exam Not up to date   Eye Exam Up to date   Exercise (times per week) 0 times per week   Current Exercises Include No Regular Exercise   Current Exercise Activities Include -   Do you need help using the phone?  No   Are you deaf or do you have serious difficulty hearing?  Yes   Do you need help with transportation? No   Do you need help shopping? Yes   Do you need help preparing meals?  No   Do you need help with housework?  Yes   Do you need help with laundry? No   Do you need help taking your medications? Yes   Do you need help managing money? No   Do you ever drive or ride in a car without wearing a seat belt? No   Have you felt unusual stress, anger or loneliness in the last month? No   Who do you live with? Spouse   If you need help, do you have trouble finding someone available to you? No   Have you been bothered in the last four weeks by sexual problems? No   Do you have difficulty concentrating, remembering or making decisions? Yes       Age-appropriate Screening Schedule:  Refer to the list below for future screening recommendations based on patient's age, sex and/or medical conditions. Orders for these recommended tests are listed in the plan section. The patient has been provided with a written plan.    Health Maintenance   Topic Date Due   •  TDAP/TD VACCINES (1 - Tdap) Never done   • ZOSTER VACCINE (1 of 2) Never done   • HEMOGLOBIN A1C  02/24/2023   • DXA SCAN  04/14/2023   • DIABETIC EYE EXAM  07/05/2023   • LIPID PANEL  08/24/2023   • URINE MICROALBUMIN  08/24/2023   • MAMMOGRAM  09/16/2023   • INFLUENZA VACCINE  Completed              Assessment & Plan   CMS Preventative Services Quick Reference  Risk Factors Identified During Encounter  Chronic Pain   Dementia/Memory   Depression/Dysphoria  Fall Risk-High or Moderate  Immunizations Discussed/Encouraged (specific Immunizations; Influenza  Inadequate Social Support, Isolation, Loneliness, Lack of Transportation, Financial Difficulties, or Caregiver Stress   Inactivity/Sedentary  Obesity/Overweight   The above risks/problems have been discussed with the patient.  Follow up actions/plans if indicated are seen below in the Assessment/Plan Section.  Pertinent information has been shared with the patient in the After Visit Summary.    Diagnoses and all orders for this visit:    1. Medicare annual wellness visit, subsequent (Primary)  Comments:  Medical/social/family hx reviewed.   Mammogram and colonoscopy UTD.   DEXA scan due April 2023.   Flu vaccine today.     2. Immunization due  Comments:  Flu vaccine today.   Orders:  -     Fluzone High-Dose 65+yrs ([id number])    3. Essential hypertension  Comments:  Stable.   Cont. current medication.   Labs with next visit; Aug labs reviewed.   RTO in 6 mo.     4. Mixed hyperlipidemia  Comments:  Stable.   Cont. current medication.   Labs with next visit; Aug labs reviewed.   RTO in 6 mo.     5. Hypothyroidism (acquired)  Comments:  Stable.   Cont. current medication.   Labs with next visit; Aug labs reviewed.   RTO in 6 mo.     6. Obstructive chronic bronchitis without exacerbation (HCC)  Comments:  Pt declines new inhaler.   Reviewed warning S/S and when to call.   Cont. Duo-Neb PRN.     7. Type 2 diabetes mellitus with hyperglycemia, without long-term current  use of insulin (HCC)  Comments:  Stable.   Cont. current medication.   Labs with next visit; Aug labs reviewed.   RTO in 6 mo.     8. Hiatal hernia with GERD  Comments:  Stable.   Cont. current medication.     Orders:  -     metoclopramide (REGLAN) 5 MG tablet; Take 1 tablet by mouth 3 (Three) Times a Day As Needed (for nausea and vomiting).  Dispense: 270 tablet; Refill: 3    9. Major depressive disorder, recurrent, mild (HCC)  Comments:  Stable.   Cont. current medication.   Labs with next visit; Aug labs reviewed.   RTO in 6 mo.     10. RLS (restless legs syndrome)  Comments:  Stable.   Cont. current medication.   Labs with next visit; Aug labs reviewed.   RTO in 6 mo.         Follow Up:   Return in about 6 months (around 5/4/2023).     An After Visit Summary and PPPS were made available to the patient.      I spent 30 minutes caring for Diane on this date of service. This time includes time spent by me in the following activities:preparing for the visit, reviewing tests, obtaining and/or reviewing a separately obtained history, performing a medically appropriate examination and/or evaluation , counseling and educating the patient/family/caregiver, ordering medications, tests, or procedures, referring and communicating with other health care professionals  and documenting information in the medical record

## 2022-12-09 DIAGNOSIS — F41.9 ANXIETY AND DEPRESSION: Primary | ICD-10-CM

## 2022-12-09 DIAGNOSIS — F32.A ANXIETY AND DEPRESSION: Primary | ICD-10-CM

## 2022-12-09 RX ORDER — SERTRALINE HYDROCHLORIDE 100 MG/1
150 TABLET, FILM COATED ORAL DAILY
Qty: 45 TABLET | Refills: 11 | Status: SHIPPED | OUTPATIENT
Start: 2022-12-09

## 2022-12-09 RX ORDER — SERTRALINE HYDROCHLORIDE 100 MG/1
150 TABLET, FILM COATED ORAL DAILY
COMMUNITY
End: 2022-12-09 | Stop reason: SDUPTHER

## 2022-12-09 RX ORDER — SERTRALINE HYDROCHLORIDE 150 MG/1
CAPSULE ORAL
Status: CANCELLED | OUTPATIENT
Start: 2022-12-09

## 2023-01-09 ENCOUNTER — LAB (OUTPATIENT)
Dept: LAB | Facility: HOSPITAL | Age: 83
End: 2023-01-09
Payer: MEDICARE

## 2023-01-09 DIAGNOSIS — C50.111 MALIGNANT NEOPLASM OF CENTRAL PORTION OF RIGHT BREAST IN FEMALE, ESTROGEN RECEPTOR POSITIVE: ICD-10-CM

## 2023-01-09 DIAGNOSIS — C91.10 CHRONIC LYMPHOID LEUKEMIA: ICD-10-CM

## 2023-01-09 DIAGNOSIS — Z17.0 MALIGNANT NEOPLASM OF CENTRAL PORTION OF RIGHT BREAST IN FEMALE, ESTROGEN RECEPTOR POSITIVE: ICD-10-CM

## 2023-01-09 LAB
ALBUMIN SERPL-MCNC: 4.1 G/DL (ref 3.5–5.2)
ALBUMIN/GLOB SERPL: 1.5 G/DL
ALP SERPL-CCNC: 127 U/L (ref 39–117)
ALT SERPL W P-5'-P-CCNC: 11 U/L (ref 1–33)
ANION GAP SERPL CALCULATED.3IONS-SCNC: 12 MMOL/L (ref 5–15)
AST SERPL-CCNC: 15 U/L (ref 1–32)
BASOPHILS # BLD AUTO: 0.04 10*3/MM3 (ref 0–0.2)
BASOPHILS NFR BLD AUTO: 0.2 % (ref 0–1.5)
BILIRUB SERPL-MCNC: 0.4 MG/DL (ref 0–1.2)
BUN SERPL-MCNC: 39 MG/DL (ref 8–23)
BUN/CREAT SERPL: 28.5 (ref 7–25)
CALCIUM SPEC-SCNC: 9.6 MG/DL (ref 8.6–10.5)
CHLORIDE SERPL-SCNC: 105 MMOL/L (ref 98–107)
CO2 SERPL-SCNC: 22 MMOL/L (ref 22–29)
CREAT SERPL-MCNC: 1.37 MG/DL (ref 0.57–1)
DEPRECATED RDW RBC AUTO: 52.8 FL (ref 37–54)
EGFRCR SERPLBLD CKD-EPI 2021: 38.9 ML/MIN/1.73
EOSINOPHIL # BLD AUTO: 0.31 10*3/MM3 (ref 0–0.4)
EOSINOPHIL NFR BLD AUTO: 1.2 % (ref 0.3–6.2)
ERYTHROCYTE [DISTWIDTH] IN BLOOD BY AUTOMATED COUNT: 16.7 % (ref 12.3–15.4)
GLOBULIN UR ELPH-MCNC: 2.7 GM/DL
GLUCOSE SERPL-MCNC: 124 MG/DL (ref 65–99)
HCT VFR BLD AUTO: 40.4 % (ref 34–46.6)
HGB BLD-MCNC: 12.1 G/DL (ref 12–15.9)
LDH SERPL-CCNC: 153 U/L (ref 135–214)
LYMPHOCYTES # BLD AUTO: 20.3 10*3/MM3 (ref 0.7–3.1)
LYMPHOCYTES NFR BLD AUTO: 77 % (ref 19.6–45.3)
MCH RBC QN AUTO: 26.8 PG (ref 26.6–33)
MCHC RBC AUTO-ENTMCNC: 30 G/DL (ref 31.5–35.7)
MCV RBC AUTO: 89.6 FL (ref 79–97)
MONOCYTES # BLD AUTO: 0.44 10*3/MM3 (ref 0.1–0.9)
MONOCYTES NFR BLD AUTO: 1.7 % (ref 5–12)
NEUTROPHILS NFR BLD AUTO: 19.9 % (ref 42.7–76)
NEUTROPHILS NFR BLD AUTO: 5.29 10*3/MM3 (ref 1.7–7)
PLATELET # BLD AUTO: 180 10*3/MM3 (ref 140–450)
PMV BLD AUTO: 12.1 FL (ref 6–12)
POTASSIUM SERPL-SCNC: 5.2 MMOL/L (ref 3.5–5.2)
PROT SERPL-MCNC: 6.8 G/DL (ref 6–8.5)
RBC # BLD AUTO: 4.51 10*6/MM3 (ref 3.77–5.28)
SODIUM SERPL-SCNC: 139 MMOL/L (ref 136–145)
URATE SERPL-MCNC: 4.8 MG/DL (ref 2.4–5.7)
WBC NRBC COR # BLD: 26.38 10*3/MM3 (ref 3.4–10.8)

## 2023-01-09 PROCEDURE — 83615 LACTATE (LD) (LDH) ENZYME: CPT

## 2023-01-09 PROCEDURE — 80053 COMPREHEN METABOLIC PANEL: CPT

## 2023-01-09 PROCEDURE — 84550 ASSAY OF BLOOD/URIC ACID: CPT

## 2023-01-09 PROCEDURE — 36415 COLL VENOUS BLD VENIPUNCTURE: CPT

## 2023-01-09 PROCEDURE — 85025 COMPLETE CBC W/AUTO DIFF WBC: CPT

## 2023-01-13 NOTE — PROGRESS NOTES
Hematology/Oncology Outpatient Follow Up    PATIENT NAME:Diane Guan  :1940  MRN: [id number]  PRIMARY CARE PHYSICIAN: Asia Queen APRN  REFERRING PHYSICIAN: Asia Queen APRN    Chief Complaint   Patient presents with   • Follow-up     Chronic lymphoid leukemia (HCC)        HISTORY OF PRESENT ILLNESS:     This is an 82-year-old female with a history of stage II right breast cancer for which she underwent right mastectomy.  Tumor was invasive well-differentiated ductal carcinoma, estrogen receptor +100%, progesterone receptor +100% and HER2/juan luis was negative.  Patient has been on anastrozole since 2019 prescribed by Dr. Palmer.     Review of her records also suggest that she has a history of CLL diagnosed in 2017 for persistent leukocytosis.  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     Over the past several years she has had progressive decline in her functionality.  She sits or lays down more than half of the time at home.  She was able to complete her daily activities.  She has significant night sweats but no weight loss.  If anything she is beginning to gain weight.     She was last seen in our office in 2019        Recently she had a CBC done which showed persistent leukocytosis with white count ranging from 15-30,000, she has anemia with hemoglobin of 11.4, normal MCV and normal platelets.  She has differential lymphocytosis noted on her counts.  On her chemistry panel she has a creatinine that ranges between 0.9-1.34        Patient is  and lives with her spouse.  She is accompanied today by her daughter for this appointment.    · 2022 patient had left screening mammogram which was essentially  negative.  Follow-up in 1 year was recommended  · 9/16/2022 patient had CT scan of the chest, abdomen and pelvis there was no pathologically enlarged lymph nodes within the chest.  There was no pathologically enlarged lymph nodes within the abdomen or pelvis.  She has normal  spleen size  Past Medical History:   Diagnosis Date   • Acute bronchitis due to human metapneumovirus 02/08/2020   • Anxiety disorder    • Arthritis    • Breast cancer (HCC) 02/2019    Invasive ductal carcinoma   • Chronic lymphocytic leukemia (CLL), B-cell (HCC) 01/2019   • Depression    • Disease of thyroid gland    • Diverticulosis of colon 2018    Identified on colonoscopy   • Dysphagia 12/2020   • Dyspnea on exertion    • Hemorrhoid    • Hiatal hernia 12/2020   • Hiatal hernia with GERD     large hiatal hernia with high-grade reflux on barium swallow; s/p laparoscopic fundoplication with gastropexy   • History of diabetes mellitus     no meds now   • Hyperlipidemia    • Hypertension    • Mycobacterium avium complex (HCC) 02/2019    aspiration pneumonia; completed abx therapy   • OAB (overactive bladder)    • Sleep apnea     daughter states pt does not have and doesn't have machine       Past Surgical History:   Procedure Laterality Date   • BLADDER SURGERY     • BREAST BIOPSY     • BRONCHOSCOPY N/A 09/13/2019    Procedure: BRONCHOSCOPY with bronchial washing;  Surgeon: Marnie Frankel MD;  Location: [facility];  Service: Pulmonary   • COLONOSCOPY  07/19/2018    severe diverticulosis   • CYST REMOVAL      Removed a fatty cyst off of her back   • ENDOSCOPY N/A 11/23/2020    Procedure: ESOPHAGOGASTRODUODENOSCOPY with dilatation (Bougie # 48, 50, 52, 54, 56, 58);  Surgeon: Den Plummer DO;  Location: [facility] ENDOSCOPY;  Service: General;  Laterality: N/A;  Post: large hiatal hernia, joshua's ulcers   • HIATAL HERNIA REPAIR N/A 12/30/2020    Procedure: Laparoscopic hiatal hernia repair with gastropexy;  Surgeon: Den Plummer DO;   Location: [facility] MAIN OR;  Service: General;  Laterality: N/A;   • MASTECTOMY Right 02/04/2019    Invasive ductal carcinoma   • TUBAL ABDOMINAL LIGATION           Current Outpatient Medications:   •  Accu-Chek Softclix Lancets lancets, 1 each by Other route Daily. Use as instructed, Disp: 100 each, Rfl: 0  •  Alcohol Swabs ([employer] Alcohol Prep Pads) 70 % pads, APPLY 1 EACH TOPICALLY DAILY., Disp: , Rfl:   •  amLODIPine (NORVASC) 10 MG tablet, Take 1 tablet by mouth Daily., Disp: 90 tablet, Rfl: 1  •  anastrozole (ARIMIDEX) 1 MG tablet, Take 1 tablet by mouth Daily., Disp: 90 tablet, Rfl: 3  •  cetirizine (zyrTEC) 10 MG tablet, Take 10 mg by mouth every night at bedtime., Disp: , Rfl:   •  chlorthalidone (HYGROTON) 25 MG tablet, TAKE 1 TABLET BY MOUTH EVERY DAY, Disp: 90 tablet, Rfl: 1  •  Cyanocobalamin (HM Super Vitamin B12) 2500 MCG chewable tablet, Chew., Disp: , Rfl:   •  docusate sodium (COLACE) 100 MG capsule, Take 100 mg by mouth 2 (Two) Times a Day., Disp: , Rfl: 3  •  ferrous sulfate 324 (65 Fe) MG tablet delayed-release EC tablet, Take 324 mg by mouth 3 (Three) Times a Week., Disp: , Rfl:   •  glucose blood (Accu-Chek Guide) test strip, 1 each by Other route Daily. Use as instructed, Disp: 100 each, Rfl: 0  •  Hearing Aid Batteries misc, 1 Units Every 7 (Seven) Days., Disp: 52 each, Rfl: 0  •  ipratropium-albuterol (DUO-NEB) 0.5-2.5 mg/3 ml nebulizer, Take 3 mL by nebulization Every 6 (Six) Hours As Needed for Wheezing or Shortness of Air., Disp: 360 mL, Rfl: 3  •  irbesartan (AVAPRO) 300 MG tablet, TAKE 1 TABLET BY MOUTH EVERY DAY, Disp: 90 tablet, Rfl: 1  •  Isopropyl Alcohol (Alcohol Wipes) 70 % misc, Apply 1 each topically Daily., Disp: 100 each, Rfl: 0  •  levothyroxine (SYNTHROID, LEVOTHROID) 100 MCG tablet, TAKE 1 TABLET (100 MCG) BY MOUTH MONDAY THROUGH SATURDAY. TAKE 2 TABLETS (200 MCG) ON SUNDAY., Disp: 90 tablet, Rfl: 0  •  metFORMIN (GLUCOPHAGE) 500 MG tablet, Take 1 tablet by mouth Daily With  Breakfast., Disp: 90 tablet, Rfl: 1  •  metoclopramide (REGLAN) 5 MG tablet, Take 1 tablet by mouth 3 (Three) Times a Day As Needed (for nausea and vomiting)., Disp: 270 tablet, Rfl: 3  •  mirtazapine (REMERON) 15 MG tablet, Take 1 tablet by mouth every night at bedtime., Disp: 90 tablet, Rfl: 1  •  multivitamins-minerals (PRESERVISION AREDS 2) capsule capsule, Take 1 capsule by mouth 2 (Two) Times a Day., Disp: , Rfl:   •  pantoprazole (PROTONIX) 40 MG EC tablet, TAKE 1 TABLET BY MOUTH EVERY MORNING BEFORE BREAKFAST. TAKE ON AN EMPTY STOMACH!, Disp: 90 tablet, Rfl: 1  •  rOPINIRole (REQUIP) 0.25 MG tablet, TAKE 1 TABLET BY MOUTH EVERY NIGHT. TAKE 1 HOUR BEFORE BEDTIME., Disp: 90 tablet, Rfl: 1  •  rosuvastatin (CRESTOR) 10 MG tablet, Take 1 tablet by mouth Every Night., Disp: 90 tablet, Rfl: 1  •  sertraline (ZOLOFT) 100 MG tablet, Take 1.5 tablets by mouth Daily., Disp: 45 tablet, Rfl: 11    No Known Allergies    Family History   Problem Relation Age of Onset   • Diabetes Mother    • Arthritis Other    • Heart disease Other        Cancer-related family history is not on file.    Social History     Tobacco Use   • Smoking status: Former     Packs/day: 0.50     Years: 2.00     Pack years: 1.00     Types: Cigarettes     Start date:      Quit date:      Years since quittin.0   • Smokeless tobacco: Never   Vaping Use   • Vaping Use: Never used   Substance Use Topics   • Alcohol use: No   • Drug use: No     I have reviewed and confirmed the accuracy of the patient's history: Chief complaint, HPI, ROS and Subjective as entered by the MA/LPN/RN. Ling Bolden MD 23       SUBJECTIVE:    Patient is here for routine follow up. She denies any new issues.          REVIEW OF SYSTEMS:  Review of Systems   Constitutional: Positive for fatigue. Negative for chills and fever.        Her fatigue is improving.   HENT: Negative for congestion, drooling, ear discharge, rhinorrhea, sinus pressure and  "tinnitus.    Eyes: Negative for photophobia, pain and discharge.   Respiratory: Negative for apnea, choking and stridor.    Cardiovascular: Negative for palpitations.   Gastrointestinal: Negative for abdominal distention, abdominal pain and anal bleeding.   Endocrine: Negative for polydipsia and polyphagia.   Genitourinary: Negative for decreased urine volume, flank pain and genital sores.   Musculoskeletal: Negative for gait problem, neck pain and neck stiffness.   Skin: Negative for color change, rash and wound.   Neurological: Negative for tremors, seizures, syncope, facial asymmetry and speech difficulty.   Hematological: Negative for adenopathy.   Psychiatric/Behavioral: Negative for agitation, confusion, hallucinations and self-injury. The patient is not hyperactive.        OBJECTIVE:    Vitals:    01/16/23 0919   BP: 112/68   Pulse: 87   Resp: 22   Temp: 96.9 °F (36.1 °C)   TempSrc: Infrared   Weight: 82.6 kg (182 lb)   Height: 162.6 cm (64\")   PainSc: 0-No pain     Body mass index is 31.24 kg/m².    ECOG    (1) Restricted in physically strenuous activity, ambulatory and able to do work of light nature    Physical Exam  Vitals and nursing note reviewed.   Constitutional:       General: She is not in acute distress.     Appearance: She is not diaphoretic.   HENT:      Head: Normocephalic and atraumatic.   Eyes:      General: No scleral icterus.        Right eye: No discharge.         Left eye: No discharge.      Conjunctiva/sclera: Conjunctivae normal.   Neck:      Thyroid: No thyromegaly.   Cardiovascular:      Rate and Rhythm: Normal rate and regular rhythm.      Heart sounds: Normal heart sounds.     No friction rub. No gallop.   Pulmonary:      Effort: Pulmonary effort is normal. No respiratory distress.      Breath sounds: No stridor. No wheezing.   Abdominal:      General: Bowel sounds are normal.      Palpations: Abdomen is soft. There is no mass.      Tenderness: There is no abdominal tenderness. There " is no guarding or rebound.   Musculoskeletal:         General: No tenderness. Normal range of motion.      Cervical back: Normal range of motion and neck supple.   Lymphadenopathy:      Cervical: No cervical adenopathy.   Skin:     General: Skin is warm.      Findings: No erythema or rash.   Neurological:      Mental Status: She is alert and oriented to person, place, and time.      Motor: No abnormal muscle tone.   Psychiatric:         Behavior: Behavior normal.     I have reexamined the patient and the results are consistent with the previously documented exam. Ling Grisel Bolden MD     RECENT LABS    WBC   Date Value Ref Range Status   01/09/2023 26.38 (H) 3.40 - 10.80 10*3/mm3 Final     RBC   Date Value Ref Range Status   01/09/2023 4.51 3.77 - 5.28 10*6/mm3 Final     Hemoglobin   Date Value Ref Range Status   01/09/2023 12.1 12.0 - 15.9 g/dL Final     Hematocrit   Date Value Ref Range Status   01/09/2023 40.4 34.0 - 46.6 % Final     MCV   Date Value Ref Range Status   01/09/2023 89.6 79.0 - 97.0 fL Final     MCH   Date Value Ref Range Status   01/09/2023 26.8 26.6 - 33.0 pg Final     MCHC   Date Value Ref Range Status   01/09/2023 30.0 (L) 31.5 - 35.7 g/dL Final     RDW   Date Value Ref Range Status   01/09/2023 16.7 (H) 12.3 - 15.4 % Final     RDW-SD   Date Value Ref Range Status   01/09/2023 52.8 37.0 - 54.0 fl Final     MPV   Date Value Ref Range Status   01/09/2023 12.1 (H) 6.0 - 12.0 fL Final     Platelets   Date Value Ref Range Status   01/09/2023 180 140 - 450 10*3/mm3 Final     Neutrophil %   Date Value Ref Range Status   01/09/2023 19.9 (L) 42.7 - 76.0 % Final     Lymphocyte %   Date Value Ref Range Status   01/09/2023 77.0 (H) 19.6 - 45.3 % Final     Monocyte %   Date Value Ref Range Status   01/09/2023 1.7 (L) 5.0 - 12.0 % Final     Eosinophil %   Date Value Ref Range Status   01/09/2023 1.2 0.3 - 6.2 % Final     Basophil %   Date Value Ref Range Status   01/09/2023 0.2 0.0 - 1.5 % Final      Neutrophils, Absolute   Date Value Ref Range Status   01/09/2023 5.29 1.70 - 7.00 10*3/mm3 Final     Lymphocytes, Absolute   Date Value Ref Range Status   01/09/2023 20.30 (H) 0.70 - 3.10 10*3/mm3 Final     Monocytes, Absolute   Date Value Ref Range Status   01/09/2023 0.44 0.10 - 0.90 10*3/mm3 Final     Eosinophils, Absolute   Date Value Ref Range Status   01/09/2023 0.31 0.00 - 0.40 10*3/mm3 Final     Basophils, Absolute   Date Value Ref Range Status   01/09/2023 0.04 0.00 - 0.20 10*3/mm3 Final     nRBC   Date Value Ref Range Status   03/15/2019 0 0 /100[WBCs] Final       Lab Results   Component Value Date    GLUCOSE 124 (H) 01/09/2023    BUN 39 (H) 01/09/2023    CREATININE 1.37 (H) 01/09/2023    EGFRIFNONA 38 (L) 10/28/2021    EGFRIFAFRI 59 (L) 04/27/2017    BCR 28.5 (H) 01/09/2023    K 5.2 01/09/2023    CO2 22.0 01/09/2023    CALCIUM 9.6 01/09/2023    ALBUMIN 4.1 01/09/2023    LABIL2 1.0 03/17/2019    AST 15 01/09/2023    ALT 11 01/09/2023         Assessment & Plan     Chronic lymphoid leukemia (HCC)  - CBC & Differential      ASSESSMENT:      Chronic lymphoid leukemia (HCC)          · Chronic lymphocytic leukemia clinical stage 0 diagnosed in 2017 on peripheral blood flow cytometry.  This was ordered due to persistent leukocytosis with differential lymphocytosis patient had CT scans which did not show any significant lymphadenopathy at the time and therefore she was observed.  Follow-up CT scans completed September 2022 shows no evidence of progressive lymphadenopathy.  Continue observation  · She presents today with significant fatigue, progressive night sweats.  These have improved since last visit.  Reviewed  · Possible dysphagia: CT scan suggest retained food products in the esophagus.  She has been seen by GI.  Request for records  · CKD stage 3:Patient to FU with her PCP.  Reviewed with her daughter  · Clinical Stage II right breast cancer: Status post right mastectomy.  Invasive well-differentiated  ductal carcinoma of the right breast.  ER positive, LA positive and HER2/juan luis negative .  Patient is currently on Arimidex initiated in 2019.  Last mammogram was in April 2021 and last DEXA scan was 4/14/2021 and was  normal  · History of iron deficiency anemia.  Her hemoglobin has normalized and her work-up for the anemia was unremarkable  · Lesion involving the left medial malleolus worrisome for skin malignancy.  Patient is scheduled with dermatologist.  This appointment is coming up soon  · COPD         Discussion        CLL, we discussed that indications for treatment would include but not limited to, pancytopenia, systemic symptoms including fatigue, night sweats and weight loss.  Other indications treatment will include recurrent infections, organ dysfunction due to massive lymphadenopathy, doubling  of leukocytosis in less than 6 months.  We discussed that CLL is not curable but can respond to chemo immunotherapy and some molecular targeted agents which can result in remissions but this disease is faulted by multiple relapses over the course of time.  We also discussed that median survivorship with low-grade lymphoma in general is estimated anywhere from 10-12 years.  I also explained that this disease is heterogeneous.  Some patients may succumb to the illness within a few days following diagnosis while some may have it for decades.  We discussed that lymphoproliferative diseases in general can affect the immune system so there is risk of  infections.  When the bone marrow is heavily involved there is risk of pancytopenia  which can result in blood, platelet transfusions and also predisposition to infections if the white count is affected.  There is also a risk of transformation to high-grade lymphoma which can result in significant symptoms and would require systemic chemo immunotherapy.  The risk of transformation varies with different types of lymphoma.  Also is not dependent on prior treatment or worsen  by conservative management.           Plans:     · There is no evidence of CLL progression at this time  · Follow-up with her PCP  · Reviewed her CBC and CMP today  · Follow-up with PCP for her renal insufficiency  · Continue Arimidex 1 mg p.o. daily #90 with refills  · Her next mammogram will be due in September 2023, left diagnostic.  Breast exam every 6 months next due with follow-up in May 2023  · DEXA scan will be due again in 4/14/2023..  This has been ordered  · Continue calcium and vitamin D 600 mg twice a day  · Follow-up again in 4 months or earlier as needed  · She has been encouraged to have her COVID vaccinations completed as well as flu vaccine  · Patient to return to clinic earlier as needed  · All questions answered     Patient verbalized understanding and is in agreement of the above plan.            I spent 30 total minutes, face-to-face, caring for Diane today.  90% of this time involved counseling and/or coordination of care as documented within this note.

## 2023-01-16 ENCOUNTER — OFFICE VISIT (OUTPATIENT)
Dept: ONCOLOGY | Facility: CLINIC | Age: 83
End: 2023-01-16
Payer: MEDICARE

## 2023-01-16 ENCOUNTER — APPOINTMENT (OUTPATIENT)
Dept: LAB | Facility: HOSPITAL | Age: 83
End: 2023-01-16
Payer: MEDICARE

## 2023-01-16 VITALS
TEMPERATURE: 96.9 F | BODY MASS INDEX: 31.07 KG/M2 | WEIGHT: 182 LBS | SYSTOLIC BLOOD PRESSURE: 112 MMHG | HEIGHT: 64 IN | DIASTOLIC BLOOD PRESSURE: 68 MMHG | RESPIRATION RATE: 22 BRPM | HEART RATE: 87 BPM

## 2023-01-16 DIAGNOSIS — Z78.0 POST-MENOPAUSAL: ICD-10-CM

## 2023-01-16 DIAGNOSIS — C91.10 CHRONIC LYMPHOID LEUKEMIA: Primary | ICD-10-CM

## 2023-01-16 PROCEDURE — 99214 OFFICE O/P EST MOD 30 MIN: CPT | Performed by: INTERNAL MEDICINE

## 2023-02-15 DIAGNOSIS — E03.9 ACQUIRED HYPOTHYROIDISM: ICD-10-CM

## 2023-02-15 RX ORDER — LEVOTHYROXINE SODIUM 0.1 MG/1
TABLET ORAL
Qty: 90 TABLET | Refills: 1 | Status: SHIPPED | OUTPATIENT
Start: 2023-02-15

## 2023-02-15 NOTE — TELEPHONE ENCOUNTER
Caller: CHERYL SLAUGHTER    Relationship: Emergency Contact    Requested Prescriptions:   Requested Prescriptions     Pending Prescriptions Disp Refills   • levothyroxine (SYNTHROID, LEVOTHROID) 100 MCG tablet 90 tablet 0        Pharmacy where request should be sent: [facility]/PHARMACY #[id number] - [facility] [place] - [address] [place] [address] AT [place] [address] - [phone] [facility] [phone] FX     Additional details provided by patient: PATIENT HAS BEEN OUT OF THIS MEDICATION.  DAUGHTER STATES THAT SINCE NOT TAKING IT, PATIENT SEEMS WEAK, TIRED AND DOES NOT REALLY FEEL LIKE DOING ANYTHING.    DAUGHTER WANTS THE OFFICE TO CALL HER TO TELL HER IF THESE ARE SIDE EFFECTS FROM NOT TAKING THIS MEDICATION.  PLEASE CALL CHERYL  [phone].    Does the patient have less than a 3 day supply:  [x] Yes  [] No    Would you like a call back once the refill request has been completed: [] Yes [x] No    If the office needs to give you a call back, can they leave a voicemail: [] Yes [x] No    Manuelito Madden Rep   02/15/23 12:07 EST

## 2023-02-15 NOTE — TELEPHONE ENCOUNTER
This can be effects of not taking the medication.  She reported taking it when I saw her in Nov.  When did she stop it?

## 2023-03-16 DIAGNOSIS — I13.10 HYPERTENSIVE HEART AND CHRONIC KIDNEY DISEASE STAGE 3: ICD-10-CM

## 2023-03-16 DIAGNOSIS — K21.9 HIATAL HERNIA WITH GERD: ICD-10-CM

## 2023-03-16 DIAGNOSIS — N18.30 HYPERTENSIVE HEART AND CHRONIC KIDNEY DISEASE STAGE 3: ICD-10-CM

## 2023-03-16 DIAGNOSIS — K44.9 HIATAL HERNIA WITH GERD: ICD-10-CM

## 2023-03-16 DIAGNOSIS — N18.32 TYPE 2 DIABETES MELLITUS WITH STAGE 3B CHRONIC KIDNEY DISEASE, WITHOUT LONG-TERM CURRENT USE OF INSULIN: ICD-10-CM

## 2023-03-16 DIAGNOSIS — F41.8 MIXED ANXIETY AND DEPRESSIVE DISORDER: ICD-10-CM

## 2023-03-16 DIAGNOSIS — E11.22 TYPE 2 DIABETES MELLITUS WITH STAGE 3B CHRONIC KIDNEY DISEASE, WITHOUT LONG-TERM CURRENT USE OF INSULIN: ICD-10-CM

## 2023-03-16 RX ORDER — MIRTAZAPINE 15 MG/1
15 TABLET, FILM COATED ORAL
Qty: 90 TABLET | Refills: 1 | Status: SHIPPED | OUTPATIENT
Start: 2023-03-16

## 2023-03-16 RX ORDER — CHLORTHALIDONE 25 MG/1
25 TABLET ORAL DAILY
Qty: 90 TABLET | Refills: 1 | Status: SHIPPED | OUTPATIENT
Start: 2023-03-16

## 2023-03-16 RX ORDER — PANTOPRAZOLE SODIUM 40 MG/1
40 TABLET, DELAYED RELEASE ORAL
Qty: 90 TABLET | Refills: 1 | Status: SHIPPED | OUTPATIENT
Start: 2023-03-16

## 2023-03-16 RX ORDER — IRBESARTAN 300 MG/1
300 TABLET ORAL DAILY
Qty: 90 TABLET | Refills: 1 | Status: SHIPPED | OUTPATIENT
Start: 2023-03-16

## 2023-03-16 RX ORDER — AMLODIPINE BESYLATE 10 MG/1
10 TABLET ORAL DAILY
Qty: 90 TABLET | Refills: 1 | Status: SHIPPED | OUTPATIENT
Start: 2023-03-16

## 2023-03-16 NOTE — TELEPHONE ENCOUNTER
Caller: CHERYL SLAUGHTER    Relationship: Emergency Contact    Best call back number: [phone]    Requested Prescriptions:   Requested Prescriptions     Pending Prescriptions Disp Refills   • irbesartan (AVAPRO) 300 MG tablet 90 tablet 1     Sig: Take 1 tablet by mouth Daily.   • amLODIPine (NORVASC) 10 MG tablet 90 tablet 1     Sig: Take 1 tablet by mouth Daily.   • pantoprazole (PROTONIX) 40 MG EC tablet 90 tablet 1     Sig: Take 1 tablet by mouth Every Morning Before Breakfast. Take on an empty stomach!   • chlorthalidone (HYGROTON) 25 MG tablet 90 tablet 1     Sig: Take 1 tablet by mouth Daily.   • mirtazapine (REMERON) 15 MG tablet 90 tablet 1     Sig: Take 1 tablet by mouth every night at bedtime.        Pharmacy where request should be sent: [facility]/PHARMACY #[id number] - [facility] [place] - [address] [place] [address] AT [place] [address] - [phone] [facility] [phone] FX     Additional details provided by patient: PATIENT HAS LESS THAN 3 DAY SUPPLY OF THE IRBESARTAN.    Does the patient have less than a 3 day supply:  [x] Yes  [] No    Would you like a call back once the refill request has been completed: [x] Yes [] No    If the office needs to give you a call back, can they leave a voicemail: [x] Yes [] No    Julianne Oshea   03/16/23 09:38 EDT

## 2023-04-17 ENCOUNTER — HOSPITAL ENCOUNTER (OUTPATIENT)
Dept: BONE DENSITY | Facility: HOSPITAL | Age: 83
Discharge: HOME | End: 2023-04-17
Admitting: INTERNAL MEDICINE
Payer: MEDICARE

## 2023-04-17 DIAGNOSIS — Z78.0 POST-MENOPAUSAL: ICD-10-CM

## 2023-04-17 PROCEDURE — 77080 DXA BONE DENSITY AXIAL: CPT

## 2023-05-05 ENCOUNTER — OFFICE VISIT (OUTPATIENT)
Dept: FAMILY MEDICINE CLINIC | Facility: CLINIC | Age: 83
End: 2023-05-05
Payer: MEDICARE

## 2023-05-05 ENCOUNTER — LAB (OUTPATIENT)
Dept: FAMILY MEDICINE CLINIC | Facility: CLINIC | Age: 83
End: 2023-05-05
Payer: MEDICARE

## 2023-05-05 VITALS
OXYGEN SATURATION: 90 % | HEART RATE: 81 BPM | WEIGHT: 177 LBS | DIASTOLIC BLOOD PRESSURE: 73 MMHG | HEIGHT: 64 IN | SYSTOLIC BLOOD PRESSURE: 127 MMHG | BODY MASS INDEX: 30.22 KG/M2

## 2023-05-05 DIAGNOSIS — K21.9 HIATAL HERNIA WITH GERD: ICD-10-CM

## 2023-05-05 DIAGNOSIS — G25.81 RESTLESS LEG SYNDROME: ICD-10-CM

## 2023-05-05 DIAGNOSIS — E11.22 TYPE 2 DIABETES MELLITUS WITH STAGE 3B CHRONIC KIDNEY DISEASE, WITHOUT LONG-TERM CURRENT USE OF INSULIN: ICD-10-CM

## 2023-05-05 DIAGNOSIS — K44.9 HIATAL HERNIA WITH GERD: ICD-10-CM

## 2023-05-05 DIAGNOSIS — J44.9 OBSTRUCTIVE CHRONIC BRONCHITIS WITHOUT EXACERBATION: ICD-10-CM

## 2023-05-05 DIAGNOSIS — F41.9 ANXIETY AND DEPRESSION: ICD-10-CM

## 2023-05-05 DIAGNOSIS — I10 ESSENTIAL HYPERTENSION: Primary | ICD-10-CM

## 2023-05-05 DIAGNOSIS — N18.32 TYPE 2 DIABETES MELLITUS WITH STAGE 3B CHRONIC KIDNEY DISEASE, WITHOUT LONG-TERM CURRENT USE OF INSULIN: ICD-10-CM

## 2023-05-05 DIAGNOSIS — E78.2 MIXED HYPERLIPIDEMIA: ICD-10-CM

## 2023-05-05 DIAGNOSIS — F32.A ANXIETY AND DEPRESSION: ICD-10-CM

## 2023-05-05 DIAGNOSIS — E03.9 ACQUIRED HYPOTHYROIDISM: ICD-10-CM

## 2023-05-05 RX ORDER — LORAZEPAM 0.5 MG/1
0.5 TABLET ORAL EVERY 8 HOURS PRN
Qty: 30 TABLET | Refills: 0 | Status: SHIPPED | OUTPATIENT
Start: 2023-05-05

## 2023-05-05 RX ORDER — ROPINIROLE 0.25 MG/1
0.25 TABLET, FILM COATED ORAL NIGHTLY
Qty: 90 TABLET | Refills: 3 | Status: SHIPPED | OUTPATIENT
Start: 2023-05-05

## 2023-05-05 RX ORDER — SERTRALINE HYDROCHLORIDE 100 MG/1
150 TABLET, FILM COATED ORAL DAILY
Qty: 45 TABLET | Refills: 11 | Status: SHIPPED | OUTPATIENT
Start: 2023-05-05

## 2023-05-05 RX ORDER — BUDESONIDE AND FORMOTEROL FUMARATE DIHYDRATE 80; 4.5 UG/1; UG/1
2 AEROSOL RESPIRATORY (INHALATION)
Qty: 10.2 G | Refills: 12 | Status: SHIPPED | OUTPATIENT
Start: 2023-05-05

## 2023-05-05 RX ORDER — LEVOTHYROXINE SODIUM 0.1 MG/1
TABLET ORAL
Qty: 90 TABLET | Refills: 3 | Status: SHIPPED | OUTPATIENT
Start: 2023-05-05

## 2023-05-05 RX ORDER — METOCLOPRAMIDE 5 MG/1
5 TABLET ORAL 3 TIMES DAILY PRN
Qty: 270 TABLET | Refills: 3 | Status: SHIPPED | OUTPATIENT
Start: 2023-05-05

## 2023-05-05 RX ORDER — ROSUVASTATIN CALCIUM 10 MG/1
10 TABLET, COATED ORAL NIGHTLY
Qty: 90 TABLET | Refills: 3 | Status: SHIPPED | OUTPATIENT
Start: 2023-05-05

## 2023-05-05 NOTE — PROGRESS NOTES
Susana Guan is a 83 y.o. female.       HPI   Pt is here today for 6 month follow up.   1) HTN- currently on amlodipine 10 mg daily; chlorthalidone 25 mg daily; irbesartan 300 mg daily. BP stable.  Denies any Cp; palpitations, SOA, dizziness, headache, trouble with vision.  2) T2DM -  Currently on metformin 500 mg daily.  A1C in August 2022 was 6.9%.  '2-120's at home.  Denies any polyuria or polydipsia.  Denies any numbness/tingling in extremities.   3) Hyperlipidemia - currently on rosuvastatin 10 mg daily.   4) Hypothyroidism - currently on levothyroxine 100 mcg daily. TSH was 2.52 in August 2022.   5) COPD- She uses Duo-Nebs as needed.  Feels worsening SOA; denies any wheezes or cough.  Has not been able to tolerate Breztri inhaler in the past.     6) GERD - currently on pantoprazole 40 mg daily; metoclopramide 5 mg tid.  Feels symptoms are stable.    7) Depression - currently on sertraline 150 mg daily; mirtazapine 15 mg at bedtime.  Moods stable but having moments where she gets very anxious; causes her to pace the floor; can't calm self down quickly.  Denies any SI or HI.   8) RLS - currently on ropinirole 0.25 mg at bedtime.   9) Recent surgery to remove skin cancer from left foot; seeing Derm for follow up today.     The following portions of the patient's history were reviewed and updated as appropriate: allergies, current medications, past family history, past medical history, past social history, past surgical history and problem list.    Review of Systems   Constitutional: Negative for chills, fatigue and fever.   Eyes: Negative for visual disturbance.   Respiratory: Negative for cough and shortness of breath.    Cardiovascular: Negative for chest pain and palpitations.   Gastrointestinal: Negative for abdominal pain, blood in stool, constipation, diarrhea, nausea, vomiting and indigestion.   Endocrine: Negative for cold intolerance, heat intolerance, polydipsia, polyphagia and  polyuria.   Genitourinary: Negative for decreased urine volume, dysuria, frequency, hematuria and urgency.   Neurological: Negative for dizziness, weakness, light-headedness, numbness and headache.   Psychiatric/Behavioral: Positive for stress. Negative for self-injury, suicidal ideas and depressed mood. The patient is nervous/anxious.        Objective   Physical Exam  Vitals reviewed.   Constitutional:       General: She is not in acute distress.     Appearance: Normal appearance. She is obese.   HENT:      Head: Normocephalic and atraumatic.   Cardiovascular:      Rate and Rhythm: Normal rate and regular rhythm.      Pulses: Normal pulses.      Heart sounds: Normal heart sounds. No murmur heard.  Pulmonary:      Effort: Pulmonary effort is normal. No respiratory distress.      Breath sounds: Normal breath sounds. No wheezing or rhonchi.   Chest:      Chest wall: No tenderness.   Abdominal:      Tenderness: There is no right CVA tenderness or left CVA tenderness.   Musculoskeletal:      Cervical back: Normal range of motion and neck supple.      Right lower leg: No edema.      Left lower leg: Edema (ACE bandage on lower leg and foot.  ) present.   Skin:     General: Skin is warm and dry.   Neurological:      General: No focal deficit present.      Mental Status: She is alert and oriented to person, place, and time.   Psychiatric:         Mood and Affect: Mood is anxious.         Behavior: Behavior is cooperative.         Thought Content: Thought content does not include homicidal or suicidal ideation.           Assessment & Plan   Diagnoses and all orders for this visit:    1. Essential hypertension (Primary)  Comments:  Stable.   Cont. current medication.   Labs ordered.   RTO in 6 mo.    2. Acquired hypothyroidism  Comments:  Stable.   Cont. current medication.   Labs ordered.   RTO in 6 mo.   Orders:  -     levothyroxine (SYNTHROID, LEVOTHROID) 100 MCG tablet; One tab po q day  Dispense: 90 tablet; Refill: 3  -      TSH; Future    3. Anxiety and depression  Comments:  Given a few lorazepam 0.5 mg PRN.    Reviewed warnings of benzo use with pt and daughter.   Cont. sertraline and Remeron.   Orders:  -     sertraline (ZOLOFT) 100 MG tablet; Take 1.5 tablets by mouth Daily.  Dispense: 45 tablet; Refill: 11  -     LORazepam (ATIVAN) 0.5 MG tablet; Take 1 tablet by mouth Every 8 (Eight) Hours As Needed for Anxiety.  Dispense: 30 tablet; Refill: 0    4. Restless leg syndrome  Comments:  Stable.   Cont. current medication.   Labs ordered.   RTO in 6 mo.  Orders:  -     rOPINIRole (REQUIP) 0.25 MG tablet; Take 1 tablet by mouth Every Night. Take 1 hour before bedtime.  Dispense: 90 tablet; Refill: 3    5. Hiatal hernia with GERD  Comments:  Stable.   Cont. current medication.     Orders:  -     metoclopramide (REGLAN) 5 MG tablet; Take 1 tablet by mouth 3 (Three) Times a Day As Needed (for nausea and vomiting).  Dispense: 270 tablet; Refill: 3    6. Mixed hyperlipidemia  Comments:  Stable.   Cont. current medication.   Labs ordered.   RTO in 6 mo.  Orders:  -     Lipid panel; Future  -     rosuvastatin (CRESTOR) 10 MG tablet; Take 1 tablet by mouth Every Night.  Dispense: 90 tablet; Refill: 3    7. Type 2 diabetes mellitus with stage 3b chronic kidney disease, without long-term current use of insulin  Comments:  Stable.   Cont. current medication.   Labs ordered.   RTO in 6 mo.  Orders:  -     Comprehensive metabolic panel; Future  -     Lipid panel; Future  -     Hemoglobin A1c; Future  -     metFORMIN (GLUCOPHAGE) 500 MG tablet; Take 1 tablet by mouth Daily With Breakfast.  Dispense: 90 tablet; Refill: 3    8. Obstructive chronic bronchitis without exacerbation  Comments:  Given Symbicort 80/4.5 inhaler.   Has PRN Duo-Neb at home.   Orders:  -     budesonide-formoterol (Symbicort) 80-4.5 MCG/ACT inhaler; Inhale 2 puffs 2 (Two) Times a Day.  Dispense: 10.2 g; Refill: 12              High BMI Plan  General weight loss/lifestyle  modification strategies discussed (elicit support from others; identify saboteurs; non-food rewards, etc).  Informal exercise measures discussed, e.g. taking stairs instead of elevator.

## 2023-05-12 NOTE — PROGRESS NOTES
Hematology/Oncology Outpatient Follow Up    PATIENT NAME:Diane Guan  :1940  MRN: [id number]  PRIMARY CARE PHYSICIAN: Aisa Queen APRN  REFERRING PHYSICIAN: No ref. provider found    Chief Complaint   Patient presents with   • Follow-up     Chronic lymphoid leukemia        HISTORY OF PRESENT ILLNESS:     This is an 82-year-old female with a history of stage II right breast cancer for which she underwent right mastectomy.  Tumor was invasive well-differentiated ductal carcinoma, estrogen receptor +100%, progesterone receptor +100% and HER2/juan luis was negative.  Patient has been on anastrozole since 2019 prescribed by Dr. Palmer.     Review of her records also suggest that she has a history of CLL diagnosed in 2017 for persistent leukocytosis.  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     Over the past several years she has had progressive decline in her functionality.  She sits or lays down more than half of the time at home.  She was able to complete her daily activities.  She has significant night sweats but no weight loss.  If anything she is beginning to gain weight.     She was last seen in our office in 2019        Recently she had a CBC done which showed persistent leukocytosis with white count ranging from 15-30,000, she has anemia with hemoglobin of 11.4, normal MCV and normal platelets.  She has differential lymphocytosis noted on her counts.  On her chemistry panel she has a creatinine that ranges between 0.9-1.34        Patient is  and lives with her spouse.  She is accompanied today by her daughter for this appointment.    · 2022 patient had left screening mammogram which was essentially negative.   Follow-up in 1 year was recommended  · 9/16/2022 patient had CT scan of the chest, abdomen and pelvis there was no pathologically enlarged lymph nodes within the chest.  There was no pathologically enlarged lymph nodes within the abdomen or pelvis.  She has normal  spleen size  Past Medical History:   Diagnosis Date   • Acute bronchitis due to human metapneumovirus 02/08/2020   • Anxiety disorder    • Arthritis    • Breast cancer 02/2019    Invasive ductal carcinoma   • Chronic lymphocytic leukemia (CLL), B-cell 01/2019   • Depression    • Disease of thyroid gland    • Diverticulosis of colon 2018    Identified on colonoscopy   • Dysphagia 12/2020   • Dyspnea on exertion    • Hemorrhoid    • Hiatal hernia 12/2020   • Hiatal hernia with GERD     large hiatal hernia with high-grade reflux on barium swallow; s/p laparoscopic fundoplication with gastropexy   • History of diabetes mellitus     no meds now   • Hyperlipidemia    • Hypertension    • 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    aspiration pneumonia; completed abx therapy   • OAB (overactive bladder)    • Skin cancer    • Sleep apnea     daughter states pt does not have and doesn't have machine       Past Surgical History:   Procedure Laterality Date   • BLADDER SURGERY     • BREAST BIOPSY     • BRONCHOSCOPY N/A 09/13/2019    Procedure: BRONCHOSCOPY with bronchial washing;  Surgeon: Marnie Frankel MD;  Location: [facility] ENDOSCOPY;  Service: Pulmonary   • COLONOSCOPY  07/19/2018    severe diverticulosis   • CYST REMOVAL      Removed a fatty cyst off of her back   • ENDOSCOPY N/A 11/23/2020    Procedure: ESOPHAGOGASTRODUODENOSCOPY with dilatation (Bougie # 48, 50, 52, 54, 56, 58);  Surgeon: Den Plummer DO;  Location: [facility] ENDOSCOPY;  Service: General;  Laterality: N/A;  Post: large hiatal hernia, joshua's ulcers   • HIATAL HERNIA REPAIR N/A 12/30/2020    Procedure: Laparoscopic hiatal hernia repair with gastropexy;  Surgeon: Den Plummer DO;  Location: [facility]  MAIN OR;  Service: General;  Laterality: N/A;   • MASTECTOMY Right 02/04/2019    Invasive ductal carcinoma   • TUBAL ABDOMINAL LIGATION           Current Outpatient Medications:   •  amLODIPine (NORVASC) 10 MG tablet, Take 1 tablet by mouth Daily., Disp: 90 tablet, Rfl: 1  •  chlorthalidone (HYGROTON) 25 MG tablet, Take 1 tablet by mouth Daily., Disp: 90 tablet, Rfl: 1  •  irbesartan (AVAPRO) 300 MG tablet, Take 1 tablet by mouth Daily., Disp: 90 tablet, Rfl: 1  •  mirtazapine (REMERON) 15 MG tablet, Take 1 tablet by mouth every night at bedtime., Disp: 90 tablet, Rfl: 1  •  pantoprazole (PROTONIX) 40 MG EC tablet, Take 1 tablet by mouth Every Morning Before Breakfast. Take on an empty stomach!, Disp: 90 tablet, Rfl: 1  •  Accu-Chek Softclix Lancets lancets, 1 each by Other route Daily. Use as instructed, Disp: 100 each, Rfl: 0  •  Alcohol Swabs ([employer] Alcohol Prep Pads) 70 % pads, APPLY 1 EACH TOPICALLY DAILY., Disp: , Rfl:   •  anastrozole (ARIMIDEX) 1 MG tablet, Take 1 tablet by mouth Daily., Disp: 90 tablet, Rfl: 3  •  budesonide-formoterol (Symbicort) 80-4.5 MCG/ACT inhaler, Inhale 2 puffs 2 (Two) Times a Day., Disp: 10.2 g, Rfl: 12  •  cetirizine (zyrTEC) 10 MG tablet, Take 10 mg by mouth every night at bedtime., Disp: , Rfl:   •  Cyanocobalamin (HM Super Vitamin B12) 2500 MCG chewable tablet, Chew., Disp: , Rfl:   •  docusate sodium (COLACE) 100 MG capsule, Take 100 mg by mouth 2 (Two) Times a Day., Disp: , Rfl: 3  •  ferrous sulfate 324 (65 Fe) MG tablet delayed-release EC tablet, Take 324 mg by mouth 3 (Three) Times a Week., Disp: , Rfl:   •  glucose blood (Accu-Chek Guide) test strip, 1 each by Other route Daily. Use as instructed, Disp: 100 each, Rfl: 0  •  Hearing Aid Batteries misc, 1 Units Every 7 (Seven) Days., Disp: 52 each, Rfl: 0  •  ipratropium-albuterol (DUO-NEB) 0.5-2.5 mg/3 ml nebulizer, Take 3 mL by nebulization Every 6 (Six) Hours As Needed for Wheezing or Shortness of Air., Disp: 360 mL, Rfl:  3  •  Isopropyl Alcohol (Alcohol Wipes) 70 % misc, Apply 1 each topically Daily., Disp: 100 each, Rfl: 0  •  levothyroxine (SYNTHROID, LEVOTHROID) 100 MCG tablet, One tab po q day, Disp: 90 tablet, Rfl: 3  •  LORazepam (ATIVAN) 0.5 MG tablet, Take 1 tablet by mouth Every 8 (Eight) Hours As Needed for Anxiety., Disp: 30 tablet, Rfl: 0  •  metFORMIN (GLUCOPHAGE) 500 MG tablet, Take 1 tablet by mouth Daily With Breakfast., Disp: 90 tablet, Rfl: 3  •  metoclopramide (REGLAN) 5 MG tablet, Take 1 tablet by mouth 3 (Three) Times a Day As Needed (for nausea and vomiting)., Disp: 270 tablet, Rfl: 3  •  multivitamins-minerals (PRESERVISION AREDS 2) capsule capsule, Take 1 capsule by mouth 2 (Two) Times a Day., Disp: , Rfl:   •  oxyCODONE (ROXICODONE) 5 MG immediate release tablet, Take  by mouth See Admin Instructions. Take 1 tablet by mouth every 4-6 hours as needed for pain, Disp: , Rfl:   •  rOPINIRole (REQUIP) 0.25 MG tablet, Take 1 tablet by mouth Every Night. Take 1 hour before bedtime., Disp: 90 tablet, Rfl: 3  •  rosuvastatin (CRESTOR) 10 MG tablet, Take 1 tablet by mouth Every Night., Disp: 90 tablet, Rfl: 3  •  sertraline (ZOLOFT) 100 MG tablet, Take 1.5 tablets by mouth Daily., Disp: 45 tablet, Rfl: 11    No Known Allergies    Family History   Problem Relation Age of Onset   • Diabetes Mother    • Arthritis Other    • Heart disease Other        Cancer-related family history is not on file.    Social History     Tobacco Use   • Smoking status: Former     Packs/day: 0.50     Years: 2.00     Pack years: 1.00     Types: Cigarettes     Start date: 1990     Quit date: 1992     Years since quittin.3   • Smokeless tobacco: Never   Vaping Use   • Vaping Use: Never used   Substance Use Topics   • Alcohol use: No   • Drug use: No     I have reviewed and confirmed the accuracy of the patient's history: Chief complaint, HPI, ROS and Subjective as entered by the MA/LPN/RN. Ling Bolden MD 05/15/23  "      SUBJECTIVE:    Patient had skin cancer removed from the left lower extremity.  She required skin graft          REVIEW OF SYSTEMS:  Review of Systems   Constitutional: Positive for fatigue. Negative for chills and fever.        Her fatigue is improving.   HENT: Negative for congestion, drooling, ear discharge, rhinorrhea, sinus pressure and tinnitus.    Eyes: Negative for photophobia, pain and discharge.   Respiratory: Negative for apnea, choking and stridor.    Cardiovascular: Negative for palpitations.   Gastrointestinal: Negative for abdominal distention, abdominal pain and anal bleeding.   Endocrine: Negative for polydipsia and polyphagia.   Genitourinary: Negative for decreased urine volume, flank pain and genital sores.   Musculoskeletal: Negative for gait problem, neck pain and neck stiffness.   Skin: Negative for color change, rash and wound.   Neurological: Negative for tremors, seizures, syncope, facial asymmetry and speech difficulty.   Hematological: Negative for adenopathy.   Psychiatric/Behavioral: Negative for agitation, confusion, hallucinations and self-injury. The patient is not hyperactive.        OBJECTIVE:    Vitals:    05/15/23 0918   BP: 145/77   Pulse: 74   Resp: 18   Temp: 98 °F (36.7 °C)   TempSrc: Infrared   SpO2: 90%   Weight: 80.3 kg (177 lb)   Height: 162.6 cm (64\")   PainSc: 0-No pain     Body mass index is 30.38 kg/m².    ECOG    (1) Restricted in physically strenuous activity, ambulatory and able to do work of light nature    Physical Exam  Vitals and nursing note reviewed.   Constitutional:       General: She is not in acute distress.     Appearance: She is not diaphoretic.   HENT:      Head: Normocephalic and atraumatic.   Eyes:      General: No scleral icterus.        Right eye: No discharge.         Left eye: No discharge.      Conjunctiva/sclera: Conjunctivae normal.   Neck:      Thyroid: No thyromegaly.   Cardiovascular:      Rate and Rhythm: Normal rate and regular " rhythm.      Heart sounds: Normal heart sounds.     No friction rub. No gallop.   Pulmonary:      Effort: Pulmonary effort is normal. No respiratory distress.      Breath sounds: No stridor. No wheezing.   Abdominal:      General: Bowel sounds are normal.      Palpations: Abdomen is soft. There is no mass.      Tenderness: There is no abdominal tenderness. There is no guarding or rebound.   Musculoskeletal:         General: No tenderness. Normal range of motion.      Cervical back: Normal range of motion and neck supple.   Lymphadenopathy:      Cervical: No cervical adenopathy.   Skin:     General: Skin is warm.      Findings: No erythema or rash.   Neurological:      Mental Status: She is alert and oriented to person, place, and time.      Motor: No abnormal muscle tone.   Psychiatric:         Behavior: Behavior normal.     Left breast exam and axilla was negative.  Right chest wall and axilla was negative for any masses.    I have reexamined the patient and the results are consistent with the previously documented exam. Ling Bolden MD     RECENT LABS    WBC   Date Value Ref Range Status   05/15/2023 29.54 (H) 3.40 - 10.80 10*3/mm3 Final     RBC   Date Value Ref Range Status   05/15/2023 4.10 3.77 - 5.28 10*6/mm3 Final     Hemoglobin   Date Value Ref Range Status   05/15/2023 11.4 (L) 12.0 - 15.9 g/dL Final     Hematocrit   Date Value Ref Range Status   05/15/2023 38.2 34.0 - 46.6 % Final     MCV   Date Value Ref Range Status   05/15/2023 93.2 79.0 - 97.0 fL Final     MCH   Date Value Ref Range Status   05/15/2023 27.8 26.6 - 33.0 pg Final     MCHC   Date Value Ref Range Status   05/15/2023 29.8 (L) 31.5 - 35.7 g/dL Final     RDW   Date Value Ref Range Status   05/15/2023 15.2 12.3 - 15.4 % Final     RDW-SD   Date Value Ref Range Status   05/15/2023 49.9 37.0 - 54.0 fl Final     MPV   Date Value Ref Range Status   05/15/2023 11.6 6.0 - 12.0 fL Final     Platelets   Date Value Ref Range Status    05/15/2023 206 140 - 450 10*3/mm3 Final     Neutrophil %   Date Value Ref Range Status   05/15/2023 17.0 (L) 42.7 - 76.0 % Final     Lymphocyte %   Date Value Ref Range Status   05/15/2023 79.2 (H) 19.6 - 45.3 % Final     Monocyte %   Date Value Ref Range Status   05/15/2023 2.3 (L) 5.0 - 12.0 % Final     Eosinophil %   Date Value Ref Range Status   05/15/2023 1.4 0.3 - 6.2 % Final     Basophil %   Date Value Ref Range Status   05/15/2023 0.1 0.0 - 1.5 % Final     Neutrophils, Absolute   Date Value Ref Range Status   05/15/2023 5.01 1.70 - 7.00 10*3/mm3 Final     Lymphocytes, Absolute   Date Value Ref Range Status   05/15/2023 23.39 (H) 0.70 - 3.10 10*3/mm3 Final     Monocytes, Absolute   Date Value Ref Range Status   05/15/2023 0.69 0.10 - 0.90 10*3/mm3 Final     Eosinophils, Absolute   Date Value Ref Range Status   05/15/2023 0.41 (H) 0.00 - 0.40 10*3/mm3 Final     Basophils, Absolute   Date Value Ref Range Status   05/15/2023 0.04 0.00 - 0.20 10*3/mm3 Final     nRBC   Date Value Ref Range Status   03/15/2019 0 0 /100[WBCs] Final       Lab Results   Component Value Date    GLUCOSE 124 (H) 01/09/2023    BUN 39 (H) 01/09/2023    CREATININE 1.37 (H) 01/09/2023    EGFRIFNONA 38 (L) 10/28/2021    EGFRIFAFRI 59 (L) 04/27/2017    BCR 28.5 (H) 01/09/2023    K 5.2 01/09/2023    CO2 22.0 01/09/2023    CALCIUM 9.6 01/09/2023    ALBUMIN 4.1 01/09/2023    LABIL2 1.0 03/17/2019    AST 15 01/09/2023    ALT 11 01/09/2023         Assessment & Plan     Chronic lymphoid leukemia  - CBC & Differential      ASSESSMENT:      Chronic lymphoid leukemia (HCC)          · Chronic lymphocytic leukemia clinical stage 0 diagnosed in 2017 on peripheral blood flow cytometry.  This was ordered due to persistent leukocytosis with differential lymphocytosis patient had CT scans which did not show any significant lymphadenopathy at the time and therefore she was observed.  Follow-up CT scans completed September 2022 shows no evidence of  progressive lymphadenopathy.  Continue observation  · She presents today with significant fatigue, progressive night sweats.  These have improved since last visit.  Reviewed  · Skin cancer involving the left lower extremity requiring skin graft.  We will call Dr. Meeks's office to get copies of the reports.  · Possible dysphagia: CT scan suggest retained food products in the esophagus.  She has been seen by GI.  Request for records  · CKD stage 3:Patient to FU with her PCP.  Reviewed with her daughter  · DEXA scan 4/17/2023 was normal.  Next DEXA scan will be due 4/17/2025  · Clinical Stage II right breast cancer: Status post right mastectomy.  Invasive well-differentiated ductal carcinoma of the right breast.  ER positive, HI positive and HER2/juan luis negative .  Patient is currently on Arimidex initiated in 2019.  Last mammogram was in April 2021 and last DEXA scan was 4/14/2021 and was  normal  · History of iron deficiency anemia.  Her hemoglobin has normalized and her work-up for the anemia was unremarkable  · Lesion involving the left medial malleolus worrisome for skin malignancy.  Patient is scheduled with dermatologist.  This appointment is coming up soon  · COPD         Discussion        CLL, we discussed that indications for treatment would include but not limited to, pancytopenia, systemic symptoms including fatigue, night sweats and weight loss.  Other indications treatment will include recurrent infections, organ dysfunction due to massive lymphadenopathy, doubling  of leukocytosis in less than 6 months.  We discussed that CLL is not curable but can respond to chemo immunotherapy and some molecular targeted agents which can result in remissions but this disease is faulted by multiple relapses over the course of time.  We also discussed that median survivorship with low-grade lymphoma in general is estimated anywhere from 10-12 years.  I also explained that this disease is heterogeneous.  Some patients may  succumb to the illness within a few days following diagnosis while some may have it for decades.  We discussed that lymphoproliferative diseases in general can affect the immune system so there is risk of  infections.  When the bone marrow is heavily involved there is risk of pancytopenia  which can result in blood, platelet transfusions and also predisposition to infections if the white count is affected.  There is also a risk of transformation to high-grade lymphoma which can result in significant symptoms and would require systemic chemo immunotherapy.  The risk of transformation varies with different types of lymphoma.  Also is not dependent on prior treatment or worsen by conservative management.           Plans:     · There is no evidence of CLL progression at this time  · Call Dr. Meeks's office to get copies of her recent pathology results  · Follow-up with her PCP  · CBC, CMP, LDH and uric acid level today 5/15/2023  · Follow-up with PCP for her renal insufficiency  · Continue Arimidex 1 mg p.o. daily #90 with refills  · Her next mammogram will be due in September 2023, left diagnostic.  Breast exam every 6 months next due in November 2023.  · DEXA scan will be due again in 4/17/2025.  · Continue calcium and vitamin D 600 mg twice a day  · Follow-up again in 4 months or earlier as needed due to dual diagnosis of CLL/breast cancer  · Patient to return to clinic earlier as needed  · All questions answered     Patient verbalized understanding and is in agreement of the above plan.           I spent 40 total minutes, face-to-face, caring for Diane today. 90% of this time involved counseling and/or coordination of care as documented within this note.

## 2023-05-15 ENCOUNTER — OFFICE VISIT (OUTPATIENT)
Dept: ONCOLOGY | Facility: CLINIC | Age: 83
End: 2023-05-15
Payer: MEDICARE

## 2023-05-15 ENCOUNTER — LAB (OUTPATIENT)
Dept: LAB | Facility: HOSPITAL | Age: 83
End: 2023-05-15
Payer: MEDICARE

## 2023-05-15 ENCOUNTER — LAB (OUTPATIENT)
Dept: FAMILY MEDICINE CLINIC | Facility: CLINIC | Age: 83
End: 2023-05-15
Payer: MEDICARE

## 2023-05-15 ENCOUNTER — TELEPHONE (OUTPATIENT)
Dept: ONCOLOGY | Facility: CLINIC | Age: 83
End: 2023-05-15

## 2023-05-15 VITALS
RESPIRATION RATE: 18 BRPM | TEMPERATURE: 98 F | WEIGHT: 177 LBS | OXYGEN SATURATION: 90 % | BODY MASS INDEX: 30.22 KG/M2 | SYSTOLIC BLOOD PRESSURE: 145 MMHG | HEART RATE: 74 BPM | HEIGHT: 64 IN | DIASTOLIC BLOOD PRESSURE: 77 MMHG

## 2023-05-15 DIAGNOSIS — C91.10 CHRONIC LYMPHOID LEUKEMIA: Primary | ICD-10-CM

## 2023-05-15 LAB
ALBUMIN SERPL-MCNC: 4.3 G/DL (ref 3.5–5.2)
ALBUMIN/GLOB SERPL: 1.7 G/DL
ALP SERPL-CCNC: 122 U/L (ref 39–117)
ALT SERPL W P-5'-P-CCNC: 10 U/L (ref 1–33)
ANION GAP SERPL CALCULATED.3IONS-SCNC: 10 MMOL/L (ref 5–15)
AST SERPL-CCNC: 16 U/L (ref 1–32)
BASOPHILS # BLD AUTO: 0.04 10*3/MM3 (ref 0–0.2)
BASOPHILS NFR BLD AUTO: 0.1 % (ref 0–1.5)
BILIRUB SERPL-MCNC: 0.2 MG/DL (ref 0–1.2)
BUN SERPL-MCNC: 35 MG/DL (ref 8–23)
BUN/CREAT SERPL: 26.7 (ref 7–25)
CALCIUM SPEC-SCNC: 9.2 MG/DL (ref 8.6–10.5)
CHLORIDE SERPL-SCNC: 107 MMOL/L (ref 98–107)
CHOLEST SERPL-MCNC: 180 MG/DL (ref 0–200)
CO2 SERPL-SCNC: 22 MMOL/L (ref 22–29)
CREAT SERPL-MCNC: 1.31 MG/DL (ref 0.57–1)
DEPRECATED RDW RBC AUTO: 49.9 FL (ref 37–54)
EGFRCR SERPLBLD CKD-EPI 2021: 40.8 ML/MIN/1.73
EOSINOPHIL # BLD AUTO: 0.41 10*3/MM3 (ref 0–0.4)
EOSINOPHIL NFR BLD AUTO: 1.4 % (ref 0.3–6.2)
ERYTHROCYTE [DISTWIDTH] IN BLOOD BY AUTOMATED COUNT: 15.2 % (ref 12.3–15.4)
GLOBULIN UR ELPH-MCNC: 2.5 GM/DL
GLUCOSE SERPL-MCNC: 107 MG/DL (ref 65–99)
HBA1C MFR BLD: 6 % (ref 4.8–5.6)
HCT VFR BLD AUTO: 38.2 % (ref 34–46.6)
HDLC SERPL-MCNC: 39 MG/DL (ref 40–60)
HGB BLD-MCNC: 11.4 G/DL (ref 12–15.9)
HOLD SPECIMEN: NORMAL
HOLD SPECIMEN: NORMAL
LDH SERPL-CCNC: 136 U/L (ref 135–214)
LDLC SERPL CALC-MCNC: 106 MG/DL (ref 0–100)
LDLC/HDLC SERPL: 2.59 {RATIO}
LYMPHOCYTES # BLD AUTO: 23.39 10*3/MM3 (ref 0.7–3.1)
LYMPHOCYTES NFR BLD AUTO: 79.2 % (ref 19.6–45.3)
MCH RBC QN AUTO: 27.8 PG (ref 26.6–33)
MCHC RBC AUTO-ENTMCNC: 29.8 G/DL (ref 31.5–35.7)
MCV RBC AUTO: 93.2 FL (ref 79–97)
MONOCYTES # BLD AUTO: 0.69 10*3/MM3 (ref 0.1–0.9)
MONOCYTES NFR BLD AUTO: 2.3 % (ref 5–12)
NEUTROPHILS NFR BLD AUTO: 17 % (ref 42.7–76)
NEUTROPHILS NFR BLD AUTO: 5.01 10*3/MM3 (ref 1.7–7)
PLATELET # BLD AUTO: 206 10*3/MM3 (ref 140–450)
PMV BLD AUTO: 11.6 FL (ref 6–12)
POTASSIUM SERPL-SCNC: 5.2 MMOL/L (ref 3.5–5.2)
PROT SERPL-MCNC: 6.8 G/DL (ref 6–8.5)
RBC # BLD AUTO: 4.1 10*6/MM3 (ref 3.77–5.28)
SODIUM SERPL-SCNC: 139 MMOL/L (ref 136–145)
TRIGL SERPL-MCNC: 200 MG/DL (ref 0–150)
TSH SERPL DL<=0.05 MIU/L-ACNC: 1.21 UIU/ML (ref 0.27–4.2)
URATE SERPL-MCNC: 6.1 MG/DL (ref 2.4–5.7)
VLDLC SERPL-MCNC: 35 MG/DL (ref 5–40)
WBC NRBC COR # BLD: 29.54 10*3/MM3 (ref 3.4–10.8)

## 2023-05-15 PROCEDURE — 83615 LACTATE (LD) (LDH) ENZYME: CPT | Performed by: INTERNAL MEDICINE

## 2023-05-15 PROCEDURE — 84550 ASSAY OF BLOOD/URIC ACID: CPT | Performed by: INTERNAL MEDICINE

## 2023-05-15 PROCEDURE — 83036 HEMOGLOBIN GLYCOSYLATED A1C: CPT | Performed by: NURSE PRACTITIONER

## 2023-05-15 PROCEDURE — 84443 ASSAY THYROID STIM HORMONE: CPT | Performed by: NURSE PRACTITIONER

## 2023-05-15 PROCEDURE — 80053 COMPREHEN METABOLIC PANEL: CPT | Performed by: NURSE PRACTITIONER

## 2023-05-15 PROCEDURE — 80061 LIPID PANEL: CPT | Performed by: NURSE PRACTITIONER

## 2023-05-15 PROCEDURE — 85025 COMPLETE CBC W/AUTO DIFF WBC: CPT

## 2023-05-15 RX ORDER — OXYCODONE HYDROCHLORIDE 5 MG/1
TABLET ORAL SEE ADMIN INSTRUCTIONS
COMMUNITY
Start: 2023-04-25

## 2023-05-15 NOTE — TELEPHONE ENCOUNTER
Called Dr. Concecpion's office and requested biopsy and pathology report from recent skin cancer biopsy on foot.

## 2023-09-08 DIAGNOSIS — E11.22 TYPE 2 DIABETES MELLITUS WITH STAGE 3B CHRONIC KIDNEY DISEASE, WITHOUT LONG-TERM CURRENT USE OF INSULIN: ICD-10-CM

## 2023-09-08 DIAGNOSIS — N18.30 HYPERTENSIVE HEART AND CHRONIC KIDNEY DISEASE STAGE 3: ICD-10-CM

## 2023-09-08 DIAGNOSIS — N18.32 TYPE 2 DIABETES MELLITUS WITH STAGE 3B CHRONIC KIDNEY DISEASE, WITHOUT LONG-TERM CURRENT USE OF INSULIN: ICD-10-CM

## 2023-09-08 DIAGNOSIS — K44.9 HIATAL HERNIA WITH GERD: ICD-10-CM

## 2023-09-08 DIAGNOSIS — K21.9 HIATAL HERNIA WITH GERD: ICD-10-CM

## 2023-09-08 DIAGNOSIS — I13.10 HYPERTENSIVE HEART AND CHRONIC KIDNEY DISEASE STAGE 3: ICD-10-CM

## 2023-09-08 RX ORDER — CHLORTHALIDONE 25 MG/1
TABLET ORAL
Qty: 90 TABLET | Refills: 1 | Status: SHIPPED | OUTPATIENT
Start: 2023-09-08

## 2023-09-08 RX ORDER — IRBESARTAN 300 MG/1
TABLET ORAL
Qty: 90 TABLET | Refills: 1 | Status: SHIPPED | OUTPATIENT
Start: 2023-09-08

## 2023-09-08 RX ORDER — PANTOPRAZOLE SODIUM 40 MG/1
40 TABLET, DELAYED RELEASE ORAL
Qty: 90 TABLET | Refills: 1 | Status: SHIPPED | OUTPATIENT
Start: 2023-09-08

## 2023-09-14 NOTE — PROGRESS NOTES
Hematology/Oncology Outpatient Follow Up    PATIENT NAME:Diane Guan  :1940  MRN: [id number]  PRIMARY CARE PHYSICIAN: Asia Queen APRN  REFERRING PHYSICIAN: No ref. provider found    Chief Complaint   Patient presents with    Follow-up     Chronic lymphoid leukemia        HISTORY OF PRESENT ILLNESS:     This is an 83-year-old female with a history of stage II right breast cancer for which she underwent right mastectomy.  Tumor was invasive well-differentiated ductal carcinoma, estrogen receptor +100%, progesterone receptor +100% and HER2/juan luis was negative.  Patient has been on anastrozole since 2019 prescribed by Dr. Palmer.     Review of her records also suggest that she has a history of CLL diagnosed in 2017 for persistent leukocytosis.  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     Over the past several years she has had progressive decline in her functionality.  She sits or lays down more than half of the time at home.  She was able to complete her daily activities.  She has significant night sweats but no weight loss.  If anything she is beginning to gain weight.     She was last seen in our office in 2019        Recently she had a CBC done which showed persistent leukocytosis with white count ranging from 15-30,000, she has anemia with hemoglobin of 11.4, normal MCV and normal platelets.  She has differential lymphocytosis noted on her counts.  On her chemistry panel she has a creatinine that ranges between 0.9-1.34        Patient is  and lives with her spouse.  She is accompanied today by her daughter for this appointment.    2022 patient had left screening mammogram which was essentially negative.   Follow-up in 1 year was recommended  9/16/2022 patient had CT scan of the chest, abdomen and pelvis there was no pathologically enlarged lymph nodes within the chest.  There was no pathologically enlarged lymph nodes within the abdomen or pelvis.  She has normal  spleen size  Past Medical History:   Diagnosis Date    Acute bronchitis due to human metapneumovirus 02/08/2020    Anxiety disorder     Arthritis     Breast cancer 02/2019    Invasive ductal carcinoma    Chronic lymphocytic leukemia (CLL), B-cell 01/2019    Depression     Disease of thyroid gland     Diverticulosis of colon 2018    Identified on colonoscopy    Dysphagia 12/2020    Dyspnea on exertion     Hemorrhoid     Hiatal hernia 12/2020    Hiatal hernia with GERD     large hiatal hernia with high-grade reflux on barium swallow; s/p laparoscopic fundoplication with gastropexy    History of diabetes mellitus     no meds now    Hyperlipidemia     Hypertension     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    aspiration pneumonia; completed abx therapy    OAB (overactive bladder)     Skin cancer     Sleep apnea     daughter states pt does not have and doesn't have machine       Past Surgical History:   Procedure Laterality Date    BLADDER SURGERY      BREAST BIOPSY      BRONCHOSCOPY N/A 09/13/2019    Procedure: BRONCHOSCOPY with bronchial washing;  Surgeon: Marnie Frankel MD;  Location: [facility] ENDOSCOPY;  Service: Pulmonary    COLONOSCOPY  07/19/2018    severe diverticulosis    CYST REMOVAL      Removed a fatty cyst off of her back    ENDOSCOPY N/A 11/23/2020    Procedure: ESOPHAGOGASTRODUODENOSCOPY with dilatation (Bougie # 48, 50, 52, 54, 56, 58);  Surgeon: Den Plummer DO;  Location: [facility] ENDOSCOPY;  Service: General;  Laterality: N/A;  Post: large hiatal hernia, joshua's ulcers    HIATAL HERNIA REPAIR N/A 12/30/2020    Procedure: Laparoscopic hiatal hernia repair with gastropexy;  Surgeon: Den Plummer DO;  Location: [facility] MAIN OR;  Service: General;   Laterality: N/A;    MASTECTOMY Right 02/04/2019    Invasive ductal carcinoma    TUBAL ABDOMINAL LIGATION           Current Outpatient Medications:     Accu-Chek Softclix Lancets lancets, 1 each by Other route Daily. Use as instructed, Disp: 100 each, Rfl: 0    Alcohol Swabs ([employer] Alcohol Prep Pads) 70 % pads, APPLY 1 EACH TOPICALLY DAILY., Disp: , Rfl:     amLODIPine (NORVASC) 10 MG tablet, Take 1 tablet by mouth Daily., Disp: 90 tablet, Rfl: 1    anastrozole (ARIMIDEX) 1 MG tablet, Take 1 tablet by mouth Daily., Disp: 90 tablet, Rfl: 3    cetirizine (zyrTEC) 10 MG tablet, Take 1 tablet by mouth every night at bedtime., Disp: , Rfl:     chlorthalidone (HYGROTON) 25 MG tablet, TAKE 1 TABLET BY MOUTH EVERY DAY, Disp: 90 tablet, Rfl: 1    docusate sodium (COLACE) 100 MG capsule, Take 1 capsule by mouth 2 (Two) Times a Day., Disp: , Rfl: 3    ferrous sulfate 324 (65 Fe) MG tablet delayed-release EC tablet, Take 1 tablet by mouth 3 (Three) Times a Week., Disp: , Rfl:     glucose blood (Accu-Chek Guide) test strip, 1 each by Other route Daily. Use as instructed, Disp: 100 each, Rfl: 0    Hearing Aid Batteries misc, 1 Units Every 7 (Seven) Days., Disp: 52 each, Rfl: 0    irbesartan (AVAPRO) 300 MG tablet, TAKE 1 TABLET BY MOUTH EVERY DAY, Disp: 90 tablet, Rfl: 1    Isopropyl Alcohol (Alcohol Wipes) 70 % misc, Apply 1 each topically Daily., Disp: 100 each, Rfl: 0    levothyroxine (SYNTHROID, LEVOTHROID) 100 MCG tablet, One tab po q day, Disp: 90 tablet, Rfl: 3    metFORMIN (GLUCOPHAGE) 500 MG tablet, Take 1 tablet by mouth Daily With Breakfast., Disp: 90 tablet, Rfl: 3    metoclopramide (REGLAN) 5 MG tablet, Take 1 tablet by mouth 3 (Three) Times a Day As Needed (for nausea and vomiting)., Disp: 270 tablet, Rfl: 3    mirtazapine (REMERON) 15 MG tablet, Take 1 tablet by mouth every night at bedtime., Disp: 90 tablet, Rfl: 1    pantoprazole (PROTONIX) 40 MG EC tablet, TAKE 1 TABLET BY MOUTH EVERY MORNING BEFORE BREAKFAST. TAKE  ON AN EMPTY STOMACH!, Disp: 90 tablet, Rfl: 1    rOPINIRole (REQUIP) 0.25 MG tablet, Take 1 tablet by mouth Every Night. Take 1 hour before bedtime., Disp: 90 tablet, Rfl: 3    rosuvastatin (CRESTOR) 10 MG tablet, Take 1 tablet by mouth Every Night., Disp: 90 tablet, Rfl: 3    sertraline (ZOLOFT) 100 MG tablet, Take 1.5 tablets by mouth Daily., Disp: 45 tablet, Rfl: 11    budesonide-formoterol (Symbicort) 80-4.5 MCG/ACT inhaler, Inhale 2 puffs 2 (Two) Times a Day. (Patient not taking: Reported on 9/15/2023), Disp: 10.2 g, Rfl: 12    Cyanocobalamin (HM Super Vitamin B12) 2500 MCG chewable tablet, Chew. (Patient not taking: Reported on 9/15/2023), Disp: , Rfl:     ipratropium-albuterol (DUO-NEB) 0.5-2.5 mg/3 ml nebulizer, Take 3 mL by nebulization Every 6 (Six) Hours As Needed for Wheezing or Shortness of Air. (Patient not taking: Reported on 9/15/2023), Disp: 360 mL, Rfl: 3    LORazepam (ATIVAN) 0.5 MG tablet, Take 1 tablet by mouth Every 8 (Eight) Hours As Needed for Anxiety. (Patient not taking: Reported on 9/15/2023), Disp: 30 tablet, Rfl: 0    oxyCODONE (ROXICODONE) 5 MG immediate release tablet, Take  by mouth See Admin Instructions. Take 1 tablet by mouth every 4-6 hours as needed for pain (Patient not taking: Reported on 9/15/2023), Disp: , Rfl:     No Known Allergies    Family History   Problem Relation Age of Onset    Diabetes Mother     Arthritis Other     Heart disease Other        Cancer-related family history is not on file.    Social History     Tobacco Use    Smoking status: Former     Packs/day: 0.50     Years: 2.00     Pack years: 1.00     Types: Cigarettes     Start date: 1990     Quit date: 1992     Years since quittin.7    Smokeless tobacco: Never   Vaping Use    Vaping Use: Never used   Substance Use Topics    Alcohol use: No    Drug use: No       I have reviewed and confirmed the accuracy of the patient's history: Chief complaint, HPI, ROS, and Subjective as entered by the  "MA/LINDA/RN. Ling Grisel Bolden MD 09/15/23        SUBJECTIVE:    Patient had skin cancer removed from the left lower extremity.  She required skin graft    Comes today by her daughter for this appointment and denies any new issues.  She denies any breast problems          REVIEW OF SYSTEMS:  Review of Systems   Constitutional:  Positive for fatigue. Negative for chills and fever.        Her fatigue is improving.   HENT:  Negative for congestion, drooling, ear discharge, rhinorrhea, sinus pressure and tinnitus.    Eyes:  Negative for photophobia, pain and discharge.   Respiratory:  Negative for apnea, choking and stridor.    Cardiovascular:  Negative for palpitations.   Gastrointestinal:  Negative for abdominal distention, abdominal pain and anal bleeding.   Endocrine: Negative for polydipsia and polyphagia.   Genitourinary:  Negative for decreased urine volume, flank pain and genital sores.   Musculoskeletal:  Negative for gait problem, neck pain and neck stiffness.   Skin:  Negative for color change, rash and wound.   Neurological:  Negative for tremors, seizures, syncope, facial asymmetry and speech difficulty.   Hematological:  Negative for adenopathy.   Psychiatric/Behavioral:  Negative for agitation, confusion, hallucinations and self-injury. The patient is not hyperactive.      OBJECTIVE:    Vitals:    09/15/23 0917   BP: 152/83   Pulse: 74   Resp: 20   Temp: 98 °F (36.7 °C)   TempSrc: Infrared   SpO2: 92%   Weight: 80.7 kg (178 lb)   Height: 162.6 cm (64\")   PainSc: 0-No pain     Body mass index is 30.55 kg/m².    ECOG    (1) Restricted in physically strenuous activity, ambulatory and able to do work of light nature    Physical Exam  Vitals and nursing note reviewed.   Constitutional:       General: She is not in acute distress.     Appearance: She is not diaphoretic.   HENT:      Head: Normocephalic and atraumatic.   Eyes:      General: No scleral icterus.        Right eye: No discharge.         Left eye: " No discharge.      Conjunctiva/sclera: Conjunctivae normal.   Neck:      Thyroid: No thyromegaly.   Cardiovascular:      Rate and Rhythm: Normal rate and regular rhythm.      Heart sounds: Normal heart sounds.     No friction rub. No gallop.   Pulmonary:      Effort: Pulmonary effort is normal. No respiratory distress.      Breath sounds: No stridor. No wheezing.   Abdominal:      General: Bowel sounds are normal.      Palpations: Abdomen is soft. There is no mass.      Tenderness: There is no abdominal tenderness. There is no guarding or rebound.   Musculoskeletal:         General: No tenderness. Normal range of motion.      Cervical back: Normal range of motion and neck supple.   Lymphadenopathy:      Cervical: No cervical adenopathy.   Skin:     General: Skin is warm.      Findings: No erythema or rash.   Neurological:      Mental Status: She is alert and oriented to person, place, and time.      Motor: No abnormal muscle tone.   Psychiatric:         Behavior: Behavior normal.   Left breast exam and axilla was negative.  Right chest wall and axilla was negative for any masses.      I have reexamined the patient and the results are consistent with the previously documented exam. Ling Grisel Bolden MD        RECENT LABS    WBC   Date Value Ref Range Status   09/15/2023 29.98 (H) 3.40 - 10.80 10*3/mm3 Final     RBC   Date Value Ref Range Status   09/15/2023 4.42 3.77 - 5.28 10*6/mm3 Final     Hemoglobin   Date Value Ref Range Status   09/15/2023 12.3 12.0 - 15.9 g/dL Final     Hematocrit   Date Value Ref Range Status   09/15/2023 40.4 34.0 - 46.6 % Final     MCV   Date Value Ref Range Status   09/15/2023 91.4 79.0 - 97.0 fL Final     MCH   Date Value Ref Range Status   09/15/2023 27.8 26.6 - 33.0 pg Final     MCHC   Date Value Ref Range Status   09/15/2023 30.4 (L) 31.5 - 35.7 g/dL Final     RDW   Date Value Ref Range Status   09/15/2023 17.0 (H) 12.3 - 15.4 % Final     RDW-SD   Date Value Ref Range Status    09/15/2023 55.3 (H) 37.0 - 54.0 fl Final     MPV   Date Value Ref Range Status   09/15/2023 12.4 (H) 6.0 - 12.0 fL Final     Platelets   Date Value Ref Range Status   09/15/2023 183 140 - 450 10*3/mm3 Final     Neutrophil %   Date Value Ref Range Status   09/15/2023 20.6 (L) 42.7 - 76.0 % Final     Lymphocyte %   Date Value Ref Range Status   09/15/2023 75.1 (H) 19.6 - 45.3 % Final     Monocyte %   Date Value Ref Range Status   09/15/2023 2.9 (L) 5.0 - 12.0 % Final     Eosinophil %   Date Value Ref Range Status   09/15/2023 1.0 0.3 - 6.2 % Final     Basophil %   Date Value Ref Range Status   09/15/2023 0.4 0.0 - 1.5 % Final     Neutrophils, Absolute   Date Value Ref Range Status   09/15/2023 6.20 1.70 - 7.00 10*3/mm3 Final     Lymphocytes, Absolute   Date Value Ref Range Status   09/15/2023 22.50 (H) 0.70 - 3.10 10*3/mm3 Final     Monocytes, Absolute   Date Value Ref Range Status   09/15/2023 0.86 0.10 - 0.90 10*3/mm3 Final     Eosinophils, Absolute   Date Value Ref Range Status   09/15/2023 0.29 0.00 - 0.40 10*3/mm3 Final     Basophils, Absolute   Date Value Ref Range Status   09/15/2023 0.13 0.00 - 0.20 10*3/mm3 Final     nRBC   Date Value Ref Range Status   03/15/2019 0 0 /100[WBCs] Final       Lab Results   Component Value Date    GLUCOSE 107 (H) 05/15/2023    BUN 35 (H) 05/15/2023    CREATININE 1.31 (H) 05/15/2023    EGFRIFNONA 38 (L) 10/28/2021    EGFRIFAFRI 59 (L) 04/27/2017    BCR 26.7 (H) 05/15/2023    K 5.2 05/15/2023    CO2 22.0 05/15/2023    CALCIUM 9.2 05/15/2023    ALBUMIN 4.3 05/15/2023    LABIL2 1.0 03/17/2019    AST 16 05/15/2023    ALT 10 05/15/2023         Assessment & Plan     Chronic lymphoid leukemia  - CBC & Differential      ASSESSMENT:      Chronic lymphoid leukemia (HCC)          Chronic lymphocytic leukemia clinical stage 0 diagnosed in 2017 on peripheral blood flow cytometry.  This was ordered due to persistent leukocytosis with differential lymphocytosis patient had CT scans which did  not show any significant lymphadenopathy at the time and therefore she was observed.  Follow-up CT scans completed September 2022 shows no evidence of progressive lymphadenopathy.  Ongoing management  She presents today with significant fatigue, progressive night sweats.  These have improved since last visit.  This is stable  Skin cancer involving the left lower extremity requiring skin graft.  We will call Dr. Meeks's office to get copies of the reports.  Possible dysphagia: CT scan suggest retained food products in the esophagus.  She has been seen by GI.  Request for records  CKD stage 3:Patient to FU with her PCP.  Reviewed with her daughter  DEXA scan 4/17/2023 was normal.  Next DEXA scan will be due 4/17/2025.  Reviewed  Clinical Stage II right breast cancer: Status post right mastectomy.  Invasive well-differentiated ductal carcinoma of the right breast.  ER positive, DE positive and HER2/juan luis negative .  Patient is currently on Arimidex initiated in 2019.  Last DEXA scan April 2023 was normal..  Patient is due for left diagnostic mammogram September 2023  History of iron deficiency anemia.  Her hemoglobin has normalized and her work-up for the anemia was unremarkable  COPD         Discussion        CLL, we discussed that indications for treatment would include but not limited to, pancytopenia, systemic symptoms including fatigue, night sweats and weight loss.  Other indications treatment will include recurrent infections, organ dysfunction due to massive lymphadenopathy, doubling  of leukocytosis in less than 6 months.  We discussed that CLL is not curable but can respond to chemo immunotherapy and some molecular targeted agents which can result in remissions but this disease is faulted by multiple relapses over the course of time.  We also discussed that median survivorship with low-grade lymphoma in general is estimated anywhere from 10-12 years.  I also explained that this disease is heterogeneous.  Some  patients may succumb to the illness within a few days following diagnosis while some may have it for decades.  We discussed that lymphoproliferative diseases in general can affect the immune system so there is risk of  infections.  When the bone marrow is heavily involved there is risk of pancytopenia  which can result in blood, platelet transfusions and also predisposition to infections if the white count is affected.  There is also a risk of transformation to high-grade lymphoma which can result in significant symptoms and would require systemic chemo immunotherapy.  The risk of transformation varies with different types of lymphoma.  Also is not dependent on prior treatment or worsen by conservative management.           Plans:     Continue observation for CLL  Check CMP LDH and uric acid levels today  Call Dr. Meeks's office to get copies of her recent pathology results  Follow-up with her PCP  Follow-up with PCP for her renal insufficiency  Continue Arimidex 1 mg p.o. daily #90 with refills  Her next mammogram will be due in September 2023, left diagnostic.  This has been ordered today 9/15/2023.  Next breast exam will be due March 2024  DEXA scan will be due again in 4/17/2025.  Continue calcium and vitamin D 600 mg twice a day  Follow-up again in 4 months or earlier as needed due to dual diagnosis of CLL/breast cancer  Patient to return to clinic earlier as needed  All questions answered     Patient verbalized understanding and is in agreement of the above plan.             I spent 40 total minutes, face-to-face, caring for Diane today. 90% of this time involved counseling and/or coordination of care as documented within this note.

## 2023-09-15 ENCOUNTER — LAB (OUTPATIENT)
Dept: LAB | Facility: HOSPITAL | Age: 83
End: 2023-09-15
Payer: MEDICARE

## 2023-09-15 ENCOUNTER — OFFICE VISIT (OUTPATIENT)
Dept: ONCOLOGY | Facility: CLINIC | Age: 83
End: 2023-09-15
Payer: MEDICARE

## 2023-09-15 VITALS
WEIGHT: 178 LBS | DIASTOLIC BLOOD PRESSURE: 83 MMHG | BODY MASS INDEX: 30.39 KG/M2 | TEMPERATURE: 98 F | HEART RATE: 74 BPM | SYSTOLIC BLOOD PRESSURE: 152 MMHG | RESPIRATION RATE: 20 BRPM | HEIGHT: 64 IN | OXYGEN SATURATION: 92 %

## 2023-09-15 DIAGNOSIS — C91.10 CHRONIC LYMPHOID LEUKEMIA: Primary | ICD-10-CM

## 2023-09-15 DIAGNOSIS — C50.111 MALIGNANT NEOPLASM OF CENTRAL PORTION OF RIGHT BREAST IN FEMALE, ESTROGEN RECEPTOR POSITIVE: ICD-10-CM

## 2023-09-15 DIAGNOSIS — Z17.0 MALIGNANT NEOPLASM OF CENTRAL PORTION OF RIGHT BREAST IN FEMALE, ESTROGEN RECEPTOR POSITIVE: ICD-10-CM

## 2023-09-15 LAB
ALBUMIN SERPL-MCNC: 4.2 G/DL (ref 3.5–5.2)
ALBUMIN/GLOB SERPL: 1.8 G/DL
ALP SERPL-CCNC: 144 U/L (ref 39–117)
ALT SERPL W P-5'-P-CCNC: 10 U/L (ref 1–33)
ANION GAP SERPL CALCULATED.3IONS-SCNC: 9 MMOL/L (ref 5–15)
AST SERPL-CCNC: 13 U/L (ref 1–32)
BASOPHILS # BLD AUTO: 0.13 10*3/MM3 (ref 0–0.2)
BASOPHILS NFR BLD AUTO: 0.4 % (ref 0–1.5)
BILIRUB SERPL-MCNC: 0.2 MG/DL (ref 0–1.2)
BUN SERPL-MCNC: 40 MG/DL (ref 8–23)
BUN/CREAT SERPL: 29.2 (ref 7–25)
CALCIUM SPEC-SCNC: 9.1 MG/DL (ref 8.6–10.5)
CHLORIDE SERPL-SCNC: 109 MMOL/L (ref 98–107)
CO2 SERPL-SCNC: 20 MMOL/L (ref 22–29)
CREAT SERPL-MCNC: 1.37 MG/DL (ref 0.57–1)
DEPRECATED RDW RBC AUTO: 55.3 FL (ref 37–54)
EGFRCR SERPLBLD CKD-EPI 2021: 38.6 ML/MIN/1.73
EOSINOPHIL # BLD AUTO: 0.29 10*3/MM3 (ref 0–0.4)
EOSINOPHIL NFR BLD AUTO: 1 % (ref 0.3–6.2)
ERYTHROCYTE [DISTWIDTH] IN BLOOD BY AUTOMATED COUNT: 17 % (ref 12.3–15.4)
GLOBULIN UR ELPH-MCNC: 2.4 GM/DL
GLUCOSE SERPL-MCNC: 117 MG/DL (ref 65–99)
HCT VFR BLD AUTO: 40.4 % (ref 34–46.6)
HGB BLD-MCNC: 12.3 G/DL (ref 12–15.9)
HOLD SPECIMEN: NORMAL
HOLD SPECIMEN: NORMAL
LDH SERPL-CCNC: 158 U/L (ref 135–214)
LYMPHOCYTES # BLD AUTO: 22.5 10*3/MM3 (ref 0.7–3.1)
LYMPHOCYTES NFR BLD AUTO: 75.1 % (ref 19.6–45.3)
MCH RBC QN AUTO: 27.8 PG (ref 26.6–33)
MCHC RBC AUTO-ENTMCNC: 30.4 G/DL (ref 31.5–35.7)
MCV RBC AUTO: 91.4 FL (ref 79–97)
MONOCYTES # BLD AUTO: 0.86 10*3/MM3 (ref 0.1–0.9)
MONOCYTES NFR BLD AUTO: 2.9 % (ref 5–12)
NEUTROPHILS NFR BLD AUTO: 20.6 % (ref 42.7–76)
NEUTROPHILS NFR BLD AUTO: 6.2 10*3/MM3 (ref 1.7–7)
PLATELET # BLD AUTO: 183 10*3/MM3 (ref 140–450)
PMV BLD AUTO: 12.4 FL (ref 6–12)
POTASSIUM SERPL-SCNC: 5 MMOL/L (ref 3.5–5.2)
PROT SERPL-MCNC: 6.6 G/DL (ref 6–8.5)
RBC # BLD AUTO: 4.42 10*6/MM3 (ref 3.77–5.28)
SODIUM SERPL-SCNC: 138 MMOL/L (ref 136–145)
URATE SERPL-MCNC: 5.9 MG/DL (ref 2.4–5.7)
WBC NRBC COR # BLD: 29.98 10*3/MM3 (ref 3.4–10.8)

## 2023-09-15 PROCEDURE — 80053 COMPREHEN METABOLIC PANEL: CPT | Performed by: INTERNAL MEDICINE

## 2023-09-15 PROCEDURE — 85025 COMPLETE CBC W/AUTO DIFF WBC: CPT

## 2023-09-15 PROCEDURE — 36415 COLL VENOUS BLD VENIPUNCTURE: CPT

## 2023-09-15 PROCEDURE — 83615 LACTATE (LD) (LDH) ENZYME: CPT | Performed by: INTERNAL MEDICINE

## 2023-09-15 PROCEDURE — 84550 ASSAY OF BLOOD/URIC ACID: CPT | Performed by: INTERNAL MEDICINE

## 2023-10-10 ENCOUNTER — APPOINTMENT (OUTPATIENT)
Dept: GENERAL RADIOLOGY | Facility: HOSPITAL | Age: 83
DRG: 207 | End: 2023-10-10
Payer: MEDICARE

## 2023-10-10 ENCOUNTER — HOSPITAL ENCOUNTER (INPATIENT)
Facility: HOSPITAL | Age: 83
LOS: 27 days | Discharge: HOME HEALTH | DRG: 207 | End: 2023-11-06
Attending: EMERGENCY MEDICINE | Admitting: INTERNAL MEDICINE
Payer: MEDICARE

## 2023-10-10 DIAGNOSIS — J18.9 PNEUMONIA OF LEFT LOWER LOBE DUE TO INFECTIOUS ORGANISM: ICD-10-CM

## 2023-10-10 DIAGNOSIS — J96.01 ACUTE HYPOXEMIC RESPIRATORY FAILURE: ICD-10-CM

## 2023-10-10 DIAGNOSIS — J96.11 CHRONIC RESPIRATORY FAILURE WITH HYPOXIA: ICD-10-CM

## 2023-10-10 DIAGNOSIS — R06.00 DYSPNEA, UNSPECIFIED TYPE: ICD-10-CM

## 2023-10-10 DIAGNOSIS — J69.0 ASPIRATION PNEUMONIA DUE TO VOMIT, UNSPECIFIED LATERALITY, UNSPECIFIED PART OF LUNG: ICD-10-CM

## 2023-10-10 DIAGNOSIS — M54.50 RIGHT LOW BACK PAIN, UNSPECIFIED CHRONICITY, UNSPECIFIED WHETHER SCIATICA PRESENT: ICD-10-CM

## 2023-10-10 DIAGNOSIS — B34.8 RHINOVIRUS INFECTION: ICD-10-CM

## 2023-10-10 DIAGNOSIS — M54.50 ACUTE LOW BACK PAIN, UNSPECIFIED BACK PAIN LATERALITY, UNSPECIFIED WHETHER SCIATICA PRESENT: ICD-10-CM

## 2023-10-10 DIAGNOSIS — R09.02 HYPOXEMIA: Primary | ICD-10-CM

## 2023-10-10 DIAGNOSIS — R53.1 WEAKNESS: ICD-10-CM

## 2023-10-10 DIAGNOSIS — T17.500A MUCUS PLUGGING OF BRONCHI: ICD-10-CM

## 2023-10-10 DIAGNOSIS — R05.9 COUGH, UNSPECIFIED TYPE: ICD-10-CM

## 2023-10-10 DIAGNOSIS — J96.01 ACUTE RESPIRATORY FAILURE WITH HYPOXIA: ICD-10-CM

## 2023-10-10 DIAGNOSIS — R07.81 RIB PAIN ON RIGHT SIDE: ICD-10-CM

## 2023-10-10 PROBLEM — J96.00 ACUTE RESPIRATORY FAILURE: Status: ACTIVE | Noted: 2023-10-10

## 2023-10-10 LAB
ALBUMIN SERPL-MCNC: 4.2 G/DL (ref 3.5–5.2)
ALBUMIN/GLOB SERPL: 1.5 G/DL
ALP SERPL-CCNC: 125 U/L (ref 39–117)
ALT SERPL W P-5'-P-CCNC: 12 U/L (ref 1–33)
ANION GAP SERPL CALCULATED.3IONS-SCNC: 12 MMOL/L (ref 5–15)
ARTERIAL PATENCY WRIST A: POSITIVE
AST SERPL-CCNC: 16 U/L (ref 1–32)
ATMOSPHERIC PRESS: ABNORMAL MM[HG]
B PARAPERT DNA SPEC QL NAA+PROBE: NOT DETECTED
B PERT DNA SPEC QL NAA+PROBE: NOT DETECTED
BASE EXCESS BLDA CALC-SCNC: -7.3 MMOL/L (ref 0–3)
BDY SITE: ABNORMAL
BILIRUB SERPL-MCNC: 0.3 MG/DL (ref 0–1.2)
BUN SERPL-MCNC: 37 MG/DL (ref 8–23)
BUN/CREAT SERPL: 28 (ref 7–25)
C PNEUM DNA NPH QL NAA+NON-PROBE: NOT DETECTED
CALCIUM SPEC-SCNC: 9.3 MG/DL (ref 8.6–10.5)
CHLORIDE SERPL-SCNC: 110 MMOL/L (ref 98–107)
CO2 BLDA-SCNC: 19.8 MMOL/L (ref 22–29)
CO2 SERPL-SCNC: 20 MMOL/L (ref 22–29)
CREAT SERPL-MCNC: 1.32 MG/DL (ref 0.57–1)
DEPRECATED RDW RBC AUTO: 51.2 FL (ref 37–54)
EGFRCR SERPLBLD CKD-EPI 2021: 40.4 ML/MIN/1.73
ERYTHROCYTE [DISTWIDTH] IN BLOOD BY AUTOMATED COUNT: 16.4 % (ref 12.3–15.4)
FLUAV SUBTYP SPEC NAA+PROBE: NOT DETECTED
FLUBV RNA ISLT QL NAA+PROBE: NOT DETECTED
GLOBULIN UR ELPH-MCNC: 2.8 GM/DL
GLUCOSE BLDC GLUCOMTR-MCNC: 131 MG/DL (ref 70–105)
GLUCOSE SERPL-MCNC: 125 MG/DL (ref 65–99)
HADV DNA SPEC NAA+PROBE: NOT DETECTED
HCO3 BLDA-SCNC: 18.6 MMOL/L (ref 21–28)
HCOV 229E RNA SPEC QL NAA+PROBE: NOT DETECTED
HCOV HKU1 RNA SPEC QL NAA+PROBE: NOT DETECTED
HCOV NL63 RNA SPEC QL NAA+PROBE: NOT DETECTED
HCOV OC43 RNA SPEC QL NAA+PROBE: NOT DETECTED
HCT VFR BLD AUTO: 38.4 % (ref 34–46.6)
HEMODILUTION: NO
HGB BLD-MCNC: 11.7 G/DL (ref 12–15.9)
HMPV RNA NPH QL NAA+NON-PROBE: NOT DETECTED
HPIV1 RNA ISLT QL NAA+PROBE: NOT DETECTED
HPIV2 RNA SPEC QL NAA+PROBE: NOT DETECTED
HPIV3 RNA NPH QL NAA+PROBE: NOT DETECTED
HPIV4 P GENE NPH QL NAA+PROBE: NOT DETECTED
INHALED O2 CONCENTRATION: 21 %
LYMPHOCYTES # BLD MANUAL: 23.56 10*3/MM3 (ref 0.7–3.1)
LYMPHOCYTES NFR BLD MANUAL: 1 % (ref 5–12)
M PNEUMO IGG SER IA-ACNC: NOT DETECTED
MCH RBC QN AUTO: 27.2 PG (ref 26.6–33)
MCHC RBC AUTO-ENTMCNC: 30.6 G/DL (ref 31.5–35.7)
MCV RBC AUTO: 89.1 FL (ref 79–97)
MODALITY: ABNORMAL
MONOCYTES # BLD: 0.31 10*3/MM3 (ref 0.1–0.9)
NEUTROPHILS # BLD AUTO: 6.73 10*3/MM3 (ref 1.7–7)
NEUTROPHILS NFR BLD MANUAL: 22 % (ref 42.7–76)
NT-PROBNP SERPL-MCNC: 419.1 PG/ML (ref 0–1800)
PATHOLOGY REVIEW: YES
PCO2 BLDA: 38.1 MM HG (ref 35–48)
PH BLDA: 7.3 PH UNITS (ref 7.35–7.45)
PLAT MORPH BLD: NORMAL
PLATELET # BLD AUTO: 183 10*3/MM3 (ref 140–450)
PMV BLD AUTO: 9.6 FL (ref 6–12)
PO2 BLDA: 52.8 MM HG (ref 83–108)
POTASSIUM SERPL-SCNC: 5.2 MMOL/L (ref 3.5–5.2)
PROT SERPL-MCNC: 7 G/DL (ref 6–8.5)
RBC # BLD AUTO: 4.31 10*6/MM3 (ref 3.77–5.28)
RBC MORPH BLD: NORMAL
RHINOVIRUS RNA SPEC NAA+PROBE: DETECTED
RSV RNA NPH QL NAA+NON-PROBE: NOT DETECTED
SAO2 % BLDCOA: 83.5 % (ref 94–98)
SARS-COV-2 RNA NPH QL NAA+NON-PROBE: NOT DETECTED
SCAN SLIDE: NORMAL
SODIUM SERPL-SCNC: 142 MMOL/L (ref 136–145)
VARIANT LYMPHS NFR BLD MANUAL: 77 % (ref 19.6–45.3)
WBC MORPH BLD: NORMAL
WBC NRBC COR # BLD: 30.6 10*3/MM3 (ref 3.4–10.8)

## 2023-10-10 PROCEDURE — 99285 EMERGENCY DEPT VISIT HI MDM: CPT

## 2023-10-10 PROCEDURE — 0202U NFCT DS 22 TRGT SARS-COV-2: CPT

## 2023-10-10 PROCEDURE — 25010000002 ENOXAPARIN PER 10 MG: Performed by: INTERNAL MEDICINE

## 2023-10-10 PROCEDURE — 94799 UNLISTED PULMONARY SVC/PX: CPT

## 2023-10-10 PROCEDURE — 71046 X-RAY EXAM CHEST 2 VIEWS: CPT

## 2023-10-10 PROCEDURE — G0378 HOSPITAL OBSERVATION PER HR: HCPCS

## 2023-10-10 PROCEDURE — 85025 COMPLETE CBC W/AUTO DIFF WBC: CPT

## 2023-10-10 PROCEDURE — 85007 BL SMEAR W/DIFF WBC COUNT: CPT

## 2023-10-10 PROCEDURE — 25010000002 ONDANSETRON PER 1 MG

## 2023-10-10 PROCEDURE — 94761 N-INVAS EAR/PLS OXIMETRY MLT: CPT

## 2023-10-10 PROCEDURE — 25010000002 METHYLPREDNISOLONE PER 125 MG

## 2023-10-10 PROCEDURE — 94640 AIRWAY INHALATION TREATMENT: CPT

## 2023-10-10 PROCEDURE — 83880 ASSAY OF NATRIURETIC PEPTIDE: CPT

## 2023-10-10 PROCEDURE — 25010000002 HYDROMORPHONE 1 MG/ML SOLUTION

## 2023-10-10 PROCEDURE — 82803 BLOOD GASES ANY COMBINATION: CPT

## 2023-10-10 PROCEDURE — 82948 REAGENT STRIP/BLOOD GLUCOSE: CPT

## 2023-10-10 PROCEDURE — 80053 COMPREHEN METABOLIC PANEL: CPT

## 2023-10-10 PROCEDURE — 36600 WITHDRAWAL OF ARTERIAL BLOOD: CPT

## 2023-10-10 RX ORDER — NITROGLYCERIN 0.4 MG/1
0.4 TABLET SUBLINGUAL
Status: DISCONTINUED | OUTPATIENT
Start: 2023-10-10 | End: 2023-11-06 | Stop reason: HOSPADM

## 2023-10-10 RX ORDER — ACETAMINOPHEN 325 MG/1
650 TABLET ORAL ONCE
Status: COMPLETED | OUTPATIENT
Start: 2023-10-10 | End: 2023-10-10

## 2023-10-10 RX ORDER — SODIUM CHLORIDE 0.9 % (FLUSH) 0.9 %
10 SYRINGE (ML) INJECTION EVERY 12 HOURS SCHEDULED
Status: DISCONTINUED | OUTPATIENT
Start: 2023-10-10 | End: 2023-11-06 | Stop reason: HOSPADM

## 2023-10-10 RX ORDER — SODIUM CHLORIDE 0.9 % (FLUSH) 0.9 %
10 SYRINGE (ML) INJECTION AS NEEDED
Status: DISCONTINUED | OUTPATIENT
Start: 2023-10-10 | End: 2023-11-06 | Stop reason: HOSPADM

## 2023-10-10 RX ORDER — IPRATROPIUM BROMIDE AND ALBUTEROL SULFATE 2.5; .5 MG/3ML; MG/3ML
3 SOLUTION RESPIRATORY (INHALATION) ONCE
Status: COMPLETED | OUTPATIENT
Start: 2023-10-10 | End: 2023-10-10

## 2023-10-10 RX ORDER — DEXTROSE MONOHYDRATE 25 G/50ML
25 INJECTION, SOLUTION INTRAVENOUS
Status: DISCONTINUED | OUTPATIENT
Start: 2023-10-10 | End: 2023-11-06 | Stop reason: HOSPADM

## 2023-10-10 RX ORDER — BUDESONIDE AND FORMOTEROL FUMARATE DIHYDRATE 160; 4.5 UG/1; UG/1
2 AEROSOL RESPIRATORY (INHALATION)
Status: DISCONTINUED | OUTPATIENT
Start: 2023-10-10 | End: 2023-10-10

## 2023-10-10 RX ORDER — FERROUS SULFATE 324(65)MG
324 TABLET, DELAYED RELEASE (ENTERIC COATED) ORAL 3 TIMES WEEKLY
Status: DISCONTINUED | OUTPATIENT
Start: 2023-10-11 | End: 2023-10-15

## 2023-10-10 RX ORDER — METHYLPREDNISOLONE SODIUM SUCCINATE 125 MG/2ML
125 INJECTION, POWDER, LYOPHILIZED, FOR SOLUTION INTRAMUSCULAR; INTRAVENOUS ONCE
Status: COMPLETED | OUTPATIENT
Start: 2023-10-10 | End: 2023-10-10

## 2023-10-10 RX ORDER — LEVOTHYROXINE SODIUM 0.1 MG/1
100 TABLET ORAL
Status: DISCONTINUED | OUTPATIENT
Start: 2023-10-11 | End: 2023-10-15

## 2023-10-10 RX ORDER — ONDANSETRON 2 MG/ML
4 INJECTION INTRAMUSCULAR; INTRAVENOUS ONCE
Status: COMPLETED | OUTPATIENT
Start: 2023-10-10 | End: 2023-10-10

## 2023-10-10 RX ORDER — BISACODYL 5 MG/1
5 TABLET, DELAYED RELEASE ORAL DAILY PRN
Status: DISCONTINUED | OUTPATIENT
Start: 2023-10-10 | End: 2023-10-14

## 2023-10-10 RX ORDER — ROPINIROLE 0.25 MG/1
0.25 TABLET, FILM COATED ORAL NIGHTLY
Status: DISCONTINUED | OUTPATIENT
Start: 2023-10-10 | End: 2023-10-15

## 2023-10-10 RX ORDER — MIRTAZAPINE 15 MG/1
15 TABLET, FILM COATED ORAL NIGHTLY
Status: DISCONTINUED | OUTPATIENT
Start: 2023-10-10 | End: 2023-10-15

## 2023-10-10 RX ORDER — CHLORTHALIDONE 25 MG/1
25 TABLET ORAL DAILY
Status: DISCONTINUED | OUTPATIENT
Start: 2023-10-11 | End: 2023-10-15

## 2023-10-10 RX ORDER — IPRATROPIUM BROMIDE AND ALBUTEROL SULFATE 2.5; .5 MG/3ML; MG/3ML
3 SOLUTION RESPIRATORY (INHALATION) EVERY 6 HOURS PRN
Status: DISCONTINUED | OUTPATIENT
Start: 2023-10-10 | End: 2023-10-14

## 2023-10-10 RX ORDER — BISACODYL 10 MG
10 SUPPOSITORY, RECTAL RECTAL DAILY PRN
Status: DISCONTINUED | OUTPATIENT
Start: 2023-10-10 | End: 2023-10-14

## 2023-10-10 RX ORDER — METHOCARBAMOL 750 MG/1
750 TABLET, FILM COATED ORAL ONCE
Status: COMPLETED | OUTPATIENT
Start: 2023-10-10 | End: 2023-10-10

## 2023-10-10 RX ORDER — ANASTROZOLE 1 MG/1
1 TABLET ORAL DAILY
Status: DISCONTINUED | OUTPATIENT
Start: 2023-10-11 | End: 2023-10-15

## 2023-10-10 RX ORDER — ENOXAPARIN SODIUM 100 MG/ML
30 INJECTION SUBCUTANEOUS DAILY
Status: DISCONTINUED | OUTPATIENT
Start: 2023-10-10 | End: 2023-10-12

## 2023-10-10 RX ORDER — ONDANSETRON 2 MG/ML
4 INJECTION INTRAMUSCULAR; INTRAVENOUS EVERY 6 HOURS PRN
Status: DISCONTINUED | OUTPATIENT
Start: 2023-10-10 | End: 2023-11-06 | Stop reason: HOSPADM

## 2023-10-10 RX ORDER — SODIUM CHLORIDE 9 MG/ML
40 INJECTION, SOLUTION INTRAVENOUS AS NEEDED
Status: DISCONTINUED | OUTPATIENT
Start: 2023-10-10 | End: 2023-11-06 | Stop reason: HOSPADM

## 2023-10-10 RX ORDER — OXYCODONE HYDROCHLORIDE 5 MG/1
5 TABLET ORAL EVERY 6 HOURS PRN
Status: DISCONTINUED | OUTPATIENT
Start: 2023-10-10 | End: 2023-10-10

## 2023-10-10 RX ORDER — ALBUTEROL SULFATE 90 UG/1
2 AEROSOL, METERED RESPIRATORY (INHALATION) ONCE
Status: COMPLETED | OUTPATIENT
Start: 2023-10-10 | End: 2023-10-10

## 2023-10-10 RX ORDER — POLYETHYLENE GLYCOL 3350 17 G/17G
17 POWDER, FOR SOLUTION ORAL DAILY PRN
Status: DISCONTINUED | OUTPATIENT
Start: 2023-10-10 | End: 2023-10-14

## 2023-10-10 RX ORDER — OXYCODONE HYDROCHLORIDE 5 MG/1
5 TABLET ORAL EVERY 6 HOURS PRN
Status: DISCONTINUED | OUTPATIENT
Start: 2023-10-10 | End: 2023-10-13

## 2023-10-10 RX ORDER — AMOXICILLIN 250 MG
2 CAPSULE ORAL 2 TIMES DAILY
Status: DISCONTINUED | OUTPATIENT
Start: 2023-10-10 | End: 2023-10-14

## 2023-10-10 RX ORDER — ROSUVASTATIN CALCIUM 10 MG/1
10 TABLET, COATED ORAL NIGHTLY
Status: DISCONTINUED | OUTPATIENT
Start: 2023-10-10 | End: 2023-10-15

## 2023-10-10 RX ORDER — CETIRIZINE HYDROCHLORIDE 10 MG/1
10 TABLET ORAL NIGHTLY
Status: DISCONTINUED | OUTPATIENT
Start: 2023-10-10 | End: 2023-10-15

## 2023-10-10 RX ORDER — ACETAMINOPHEN 325 MG/1
650 TABLET ORAL EVERY 4 HOURS PRN
Status: DISCONTINUED | OUTPATIENT
Start: 2023-10-10 | End: 2023-10-18

## 2023-10-10 RX ORDER — NICOTINE POLACRILEX 4 MG
15 LOZENGE BUCCAL
Status: DISCONTINUED | OUTPATIENT
Start: 2023-10-10 | End: 2023-10-18

## 2023-10-10 RX ORDER — INSULIN LISPRO 100 [IU]/ML
2-7 INJECTION, SOLUTION INTRAVENOUS; SUBCUTANEOUS
Status: DISCONTINUED | OUTPATIENT
Start: 2023-10-10 | End: 2023-10-15

## 2023-10-10 RX ORDER — AMLODIPINE BESYLATE 5 MG/1
10 TABLET ORAL DAILY
Status: DISCONTINUED | OUTPATIENT
Start: 2023-10-10 | End: 2023-10-15

## 2023-10-10 RX ORDER — LOSARTAN POTASSIUM 50 MG/1
100 TABLET ORAL
Status: DISCONTINUED | OUTPATIENT
Start: 2023-10-11 | End: 2023-10-13

## 2023-10-10 RX ORDER — LORAZEPAM 0.5 MG/1
0.5 TABLET ORAL EVERY 8 HOURS PRN
Status: DISCONTINUED | OUTPATIENT
Start: 2023-10-10 | End: 2023-10-10

## 2023-10-10 RX ORDER — PANTOPRAZOLE SODIUM 40 MG/1
40 TABLET, DELAYED RELEASE ORAL
Status: DISCONTINUED | OUTPATIENT
Start: 2023-10-11 | End: 2023-10-15

## 2023-10-10 RX ORDER — IBUPROFEN 600 MG/1
1 TABLET ORAL
Status: DISCONTINUED | OUTPATIENT
Start: 2023-10-10 | End: 2023-11-06 | Stop reason: HOSPADM

## 2023-10-10 RX ADMIN — Medication 10 ML: at 21:04

## 2023-10-10 RX ADMIN — METHYLPREDNISOLONE SODIUM SUCCINATE 125 MG: 125 INJECTION, POWDER, FOR SOLUTION INTRAMUSCULAR; INTRAVENOUS at 18:21

## 2023-10-10 RX ADMIN — ALBUTEROL SULFATE 2 PUFF: 108 AEROSOL, METERED RESPIRATORY (INHALATION) at 15:14

## 2023-10-10 RX ADMIN — ACETAMINOPHEN 650 MG: 325 TABLET, FILM COATED ORAL at 18:21

## 2023-10-10 RX ADMIN — ACETAMINOPHEN 650 MG: 325 TABLET, FILM COATED ORAL at 23:40

## 2023-10-10 RX ADMIN — IPRATROPIUM BROMIDE AND ALBUTEROL SULFATE 3 ML: .5; 3 SOLUTION RESPIRATORY (INHALATION) at 16:37

## 2023-10-10 RX ADMIN — AMLODIPINE BESYLATE 10 MG: 5 TABLET ORAL at 20:56

## 2023-10-10 RX ADMIN — ROSUVASTATIN 10 MG: 10 TABLET, FILM COATED ORAL at 20:55

## 2023-10-10 RX ADMIN — OXYCODONE HYDROCHLORIDE 5 MG: 5 TABLET ORAL at 21:24

## 2023-10-10 RX ADMIN — MIRTAZAPINE 15 MG: 15 TABLET, FILM COATED ORAL at 20:56

## 2023-10-10 RX ADMIN — HYDROMORPHONE HYDROCHLORIDE 0.5 MG: 1 INJECTION, SOLUTION INTRAMUSCULAR; INTRAVENOUS; SUBCUTANEOUS at 15:51

## 2023-10-10 RX ADMIN — ONDANSETRON 4 MG: 2 INJECTION INTRAMUSCULAR; INTRAVENOUS at 15:51

## 2023-10-10 RX ADMIN — ENOXAPARIN SODIUM 30 MG: 100 INJECTION SUBCUTANEOUS at 20:55

## 2023-10-10 RX ADMIN — ROPINIROLE HYDROCHLORIDE 0.25 MG: 0.25 TABLET, FILM COATED ORAL at 20:56

## 2023-10-10 RX ADMIN — CETIRIZINE HYDROCHLORIDE 10 MG: 10 TABLET, FILM COATED ORAL at 20:56

## 2023-10-10 RX ADMIN — METHOCARBAMOL 750 MG: 750 TABLET ORAL at 15:51

## 2023-10-10 RX ADMIN — DOCUSATE SODIUM 50 MG AND SENNOSIDES 8.6 MG 2 TABLET: 8.6; 5 TABLET, FILM COATED ORAL at 20:55

## 2023-10-10 NOTE — ED PROVIDER NOTES
Subjective   History of Present Illness  83-year-old female presented to the ED with chief complaint of R rib and back pain onset prior to arrival.  Patient states she was trying to use the restroom and also turn on a light when she lost footing and balance and fell onto the arm of her assistive device around her toilet.  She denies tripping.  States that she felt fine this morning when waking up.  She denies hitting her head, LOC, lightheadedness, shortness of air, vomiting, chest pain, and abdominal pain.  States fall was unwitnessed and she had to crawl into the bedroom to be able to grab onto something to help herself get back up.  Notes rib pain is exacerbated with coughing.  States that she has had a clear sputum productive cough for the last 3 weeks.  She was evaluated by [facility] on 9/29/2023 and given a Z-Erick, she denies any relief of symptoms with the Z-Erick and over-the-counter cough syrup.  Reports pain 10 out of 10 with movement, at rest 6 out of 10.  States her last fall prior to this incident was 1 year ago secondary to arthritis in her left knee she sometimes has difficulty with balance.  Patient notes that she has a Symbicort inhaler that she has been out of.        Review of Systems   Constitutional:  Positive for fatigue. Negative for chills and fever.   HENT:  Positive for congestion. Negative for rhinorrhea, sinus pressure and sinus pain.    Respiratory:  Positive for cough. Negative for chest tightness and shortness of breath.    Cardiovascular:  Negative for chest pain and palpitations.   Gastrointestinal:  Negative for abdominal pain, constipation, diarrhea, nausea and vomiting.   Musculoskeletal:  Positive for back pain. Negative for gait problem.        + R rib pain   Neurological:  Negative for dizziness, tremors, syncope, weakness, light-headedness and headaches.   Psychiatric/Behavioral:  Negative for confusion.    All other systems reviewed and are negative.      Past Medical  History:   Diagnosis Date    Acute bronchitis due to human metapneumovirus 02/08/2020    Anxiety disorder     Arthritis     Breast cancer 02/2019    Invasive ductal carcinoma    Chronic lymphocytic leukemia (CLL), B-cell 01/2019    Depression     Disease of thyroid gland     Diverticulosis of colon 2018    Identified on colonoscopy    Dysphagia 12/2020    Dyspnea on exertion     Fall at home 10/11/2023    Hemorrhoid     Hiatal hernia 12/2020    Hiatal hernia with GERD     large hiatal hernia with high-grade reflux on barium swallow; s/p laparoscopic fundoplication with gastropexy    History of breast cancer 10/11/2023    History of cigarette smoking 10/11/2023    History of diabetes mellitus     no meds now    Hyperlipidemia     Hypertension     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    aspiration pneumonia; completed abx therapy    OAB (overactive bladder)     Personal history of CLL (chronic lymphocytic leukemia) 10/11/2023    Skin cancer     Sleep apnea     daughter states pt does not have and doesn't have machine       No Known Allergies    Past Surgical History:   Procedure Laterality Date    BLADDER SURGERY      BREAST BIOPSY      BRONCHOSCOPY N/A 09/13/2019    Procedure: BRONCHOSCOPY with bronchial washing;  Surgeon: Marnie Frankel MD;  Location: [facility] ENDOSCOPY;  Service: Pulmonary    BRONCHOSCOPY Bilateral 10/18/2023    Procedure: BRONCHOSCOPY AT BEDSIDE;  Surgeon: Vinicius Heredia DO;  Location: [facility] ENDOSCOPY;  Service: Pulmonary;  Laterality: Bilateral;    COLONOSCOPY  07/19/2018    severe diverticulosis    CYST REMOVAL      Removed a fatty cyst off of her back    ENDOSCOPY N/A 11/23/2020    Procedure: ESOPHAGOGASTRODUODENOSCOPY with dilatation (Bougie # 48, 50, 52, 54, 56, 58);  Surgeon: Den Plummer DO;  Location: [facility] ENDOSCOPY;  Service: General;  Laterality: N/A;  Post: large hiatal hernia, joshua's ulcers    HIATAL HERNIA REPAIR N/A 12/30/2020    Procedure: Laparoscopic hiatal hernia  repair with gastropexy;  Surgeon: Den Plummer DO;  Location: [facility] MAIN OR;  Service: General;  Laterality: N/A;    MASTECTOMY Right 2019    Invasive ductal carcinoma    TUBAL ABDOMINAL LIGATION         Family History   Problem Relation Age of Onset    Diabetes Mother     Arthritis Other     Heart disease Other        Social History     Socioeconomic History    Marital status:    Tobacco Use    Smoking status: Former     Packs/day: 0.50     Years: 2.00     Additional pack years: 0.00     Total pack years: 1.00     Types: Cigarettes     Start date: 1990     Quit date: 1992     Years since quittin.8    Smokeless tobacco: Never   Vaping Use    Vaping Use: Never used   Substance and Sexual Activity    Alcohol use: No    Drug use: No    Sexual activity: Defer           Objective   Physical Exam  Vitals and nursing note reviewed.   Constitutional:       General: She is not in acute distress.     Appearance: Normal appearance. She is normal weight. She is ill-appearing. She is not toxic-appearing.   HENT:      Head: Normocephalic and atraumatic.      Nose: No congestion or rhinorrhea.      Mouth/Throat:      Mouth: Mucous membranes are moist.      Pharynx: Oropharynx is clear. No oropharyngeal exudate or posterior oropharyngeal erythema.   Eyes:      Extraocular Movements: Extraocular movements intact.      Conjunctiva/sclera: Conjunctivae normal.      Pupils: Pupils are equal, round, and reactive to light.   Cardiovascular:      Rate and Rhythm: Normal rate and regular rhythm.      Heart sounds: Normal heart sounds. No murmur heard.     No friction rub. No gallop.   Pulmonary:      Effort: Pulmonary effort is normal. No accessory muscle usage.      Breath sounds: Decreased air movement present. Rhonchi present.      Comments: Decreased air movement throughout all lung fields with rhonchi.  Musculoskeletal:      Cervical back: Normal range of motion and neck supple.      Comments: Right ribs  and low  to palpation, limited range of motion secondary to pain.  Bruising noted to right lower ribs.  No crepitus no step-off.   Skin:     General: Skin is warm and dry.   Neurological:      General: No focal deficit present.      Mental Status: She is alert and oriented to person, place, and time. Mental status is at baseline.      Cranial Nerves: No cranial nerve deficit.   Psychiatric:         Mood and Affect: Mood normal.         Behavior: Behavior normal.         Thought Content: Thought content normal.         Procedures           ED Course  ED Course as of 11/11/23 2009   Tue Oct 10, 2023   1432 Assumed patient care [MV]   1537 Checks x-ray read and interpreted by me and the radiologist showing no acute abnormality, no rib fracture, no pneumothorax, no pleural effusion, and no consolidation. [MV]   1537 Patient has an elevated white count 30.60, compared to labs previously from 3 weeks ago white count of 29.98, and 4 months ago 29.54.  Patient states that she has a history of leukemia and sees an oncologist and they monitor for her levels but has not had any cancer activity.  This explains the elevated white count along with the congestion. [MV]   1610 Rechecked patient, she recently was given pain medications and muscle relaxer, notes that she is still in pain.  Per nurse after given the Dilaudid her O2 sats began to drop she is now on 2 L of oxygen. [MV]   1615 Respiratory PCR came back positive for rhinovirus. [MV]   1617 Pathology Review: Yes [MV]   1647 Reassessed patient after DuoNeb.  Much improved lung sounds.  Patient states that she has an inhaler at home but has been out of this.  Has a follow-up with her PCP tomorrow.  We will try weaning patient off of oxygen and see if able to discharge home. [MV]   1734 Pt is still fluctuating in high 86-88's on RA.  [MV]   1806 ABG showed pH is 7.2.  O2 of 83.5 and CO2 of 19.8.  Patient was placed back on 2 L of nasal cannula.  Consulting  "hospitalist for admission. [MV]   1811 Discussed patient case with  who recommended admission for hypoxia with mid resp failure.  [MV]   1812 Solu-medrol ordered due to ABG results, with hypoxia and pt's hx of dyspnea.  [MV]   1815 Pt states she would like something more for pain. Ordered Tylenol. She is agreeable with plan for admission.  [MV]   1825 Spoke with Dr. Mckeon who agrees to accept the patient.  [MV]      ED Course User Index  [MV] Sheree Field PA-C                                           Medical Decision Making  Appropriate PPE worn during exam.    /74   Pulse 71   Temp 97.8 °F (36.6 °C) (Oral)   Resp 18   Ht 162.6 cm (64\")   Wt 78.5 kg (173 lb)   SpO2 91%   BMI 29.70 kg/m²      Co-morbidities --  has a past medical history of Acute bronchitis due to human metapneumovirus (02/08/2020), Anxiety disorder, Arthritis, Breast cancer (02/2019), Chronic lymphocytic leukemia (CLL), B-cell (01/2019), Depression, Disease of thyroid gland, Diverticulosis of colon (2018), Dysphagia (12/2020), Dyspnea on exertion, Hemorrhoid, Hiatal hernia (12/2020), Hiatal hernia with GERD, History of diabetes mellitus, Hyperlipidemia, Hypertension, Mycobacterium avium complex (02/2019), OAB (overactive bladder), Skin cancer, and Sleep apnea.  Radiology interpretation --  X-rays reviewed by me and interpreted by radiologist:  XR Chest 2 View    Result Date: 10/10/2023  Impression: No acute abnormality. No definite evidence of a displaced rib fracture or pneumothorax. Electronically Signed: Jameel Ambrose DO  10/10/2023 3:29 PM EDT  Workstation ID: [id number]        Lab interpretation --  Labs viewed by me significant for the following:   Differentials -- Includes, but not limited to the following: rib fracture, muscle strain, pneumothorax, pneumonia, viral illness, influenza, Covid.     Plan -- 83-year-old female presented to the ED with chief complaint of right rib and back pain onset prior to " arrival.  States that she fell while trying to use the restroom and turning on the light.  She felt fine prior to incident, but notes that she has had a 3-week history of cough and congestion.  Upon exam right ribs were tender to palpation and bruising was noted with no crepitus or step-off.  Decreased air movement was noted throughout all lung fields with rhonchi present.  Patient was given albuterol inhaler, while PCR panel was pending.  She was given Robaxin 750 mg plus Zofran 4 mg and Dilaudid 0.5 mg for pain.  Once given 0.5 mg Dilaudid patient's O2 sats dropped to the 80s, and she was placed on 2 L of oxygen which returned her sats to the 90s.  Chest x-ray was ordered to rule out rib fracture, pneumothorax, consolidation, pneumonia.  Chest x-ray read and interpreted by me and the radiologist showing no acute process, normal.  Patient was found to have an elevated white count of 30.60, she states that she has a history of leukemia without active cancer cells.  On chart review it was found that labs dating back to 3 weeks ago and even back to 9 months ago white count elevated and higher 20s and low 30s.  Respiratory PCR was ordered and positive for rhinovirus.  Patient was then given a DuoNeb and responded well stating she felt like she could go home.  When trying to wean off O2, patient sats remained to be in the 80s.  An ABG was obtained, and showed pH of 7.26 and PCO2 of 52, and patient was diagnosed with hypoxemia with mild respiratory failure.  Discussed patient's case with Dr. Thorne, who advised admission as well as ordering Solu-Medrol. Dr. Mckeon agreed to accept the patient.  Patient requested something more for pain Tylenol was ordered.  Patient was agreeable and understanding with plan for admission.  Remained stable while in the ED and O2 sats report returned to the 90s with 2 L of O2.    I discussed the findings and recommendations with the patient who voices understanding. Stable while in the ER.            Problems Addressed:  Chronic respiratory failure with hypoxia: complicated acute illness or injury  Cough, unspecified type: complicated acute illness or injury  Dyspnea, unspecified type: complicated acute illness or injury  Hypoxemia: complicated acute illness or injury  Rhinovirus infection: complicated acute illness or injury  Rib pain on right side: complicated acute illness or injury  Right low back pain, unspecified chronicity, unspecified whether sciatica present: complicated acute illness or injury    Amount and/or Complexity of Data Reviewed  Labs: ordered. Decision-making details documented in ED Course.  Radiology: ordered.    Risk  OTC drugs.  Prescription drug management.  Decision regarding hospitalization.        Final diagnoses:   Hypoxemia   Chronic respiratory failure with hypoxia   Rhinovirus infection   Dyspnea, unspecified type   Cough, unspecified type   Rib pain on right side   Right low back pain, unspecified chronicity, unspecified whether sciatica present       ED Disposition  ED Disposition       ED Disposition   Decision to Admit    Condition   --    Comment   Level of Care: Telemetry [5]   Diagnosis: Acute respiratory failure [518.81.ICD-9-CM]   Admitting Physician: FLACA JAMESON [[id number]]   Attending Physician: FLACA JAMESON [[id number]]                 [facility]  [address] [facility] [address]  [phone]        Asia Queen, APRN  [address]  [address]  [place] [address]  [phone]          Ling Bolden MD  [address] [facility]  [address]  [place] [address]  [phone]    Call in 2 week(s)      Eron Aquino MD  [address] [facility] [place] [address]  [phone]    Call in 1 week(s)           Medication List        New Prescriptions      cyclobenzaprine 10 MG tablet  Commonly known as: FLEXERIL  Take 1 tablet by mouth 3 (Three) Times a Day As Needed for Muscle Spasms for up to 7 days.     sucralfate 1 g  tablet  Commonly known as: CARAFATE  Take 1 tablet by mouth 4 (Four) Times a Day Before Meals & at Bedtime for 30 days.     tamsulosin 0.4 MG capsule 24 hr capsule  Commonly known as: FLOMAX  Take 1 capsule by mouth Daily for 30 days.            Changed      oxyCODONE 5 MG immediate release tablet  Commonly known as: ROXICODONE  Take 1 tablet by mouth Every 6 (Six) Hours As Needed for Moderate Pain or Severe Pain for up to 5 days.  What changed:   how much to take  when to take this  reasons to take this  additional instructions               Where to Get Your Medications        These medications were sent to [id number] - [facility] [place] - [address] [place] [address] AT [place] [address] - [phone]  - [phone] [address] [place] [address]      Phone: [phone]   cyclobenzaprine 10 MG tablet  oxyCODONE 5 MG immediate release tablet  sucralfate 1 g tablet  tamsulosin 0.4 MG capsule 24 hr capsule            Sheree Field PA-C  10/10/23 1848       Sheree Field PA-C  11/11/23 2009

## 2023-10-10 NOTE — Clinical Note
Level of Care: Med/Surg [1]   Admitting Physician: FLACA JAMESON [[id number]]   Attending Physician: FLACA JAMESON [[id number]]

## 2023-10-10 NOTE — ED NOTES
Pt in ER with c/o R rib pain and R sided back pain after a mechanical fall today.  Pt reports she tripped and fell striking her R ribs on the arm of a commode.  Pt reports pain with coughing, movement and breathing.  Pt has productive cough.  Pt denies any CP, HA, fever or NVD.  Pt is A/O x4 and ambulatory.  No s/s of distress.  RR even and unlabored.  Family at bedside. Will continue to monitor.

## 2023-10-10 NOTE — H&P
[facility] Medicine Services  History & Physical    Patient Name: Diane Guan  : 1940  MRN: [id number]  Primary Care Physician:  Asia Queen APRN  Date of admission: 10/10/2023  Date and Time of Service: 10/10/2023 at 1900.    Subjective      Chief Complaint: Patient came in after a fall with right rib and right-sided back pain.    History of Present Illness: Diane Guan is a 83 y.o. female with past medical history of DM 2, hypertension and breast cancer who presented to [facility] on 10/10/2023 complaining of fall in the bathroom today with hitting her right side of the rib cage and right back with pain.  She says that her right leg has a little problem giving away at times and gave her while she was in the restroom today and she fell hitting her right side of the chest wall and right back on the commode.  She had pretty significant pain and that is what brought her here.  She says that she had some cough going on for about 3 weeks and was given a Z-Erick with which her cough is getting better.  She denies any fever chills shortness of breath nausea vomiting diarrhea constipation abdominal pain hematemesis hematochezia melena hemoptysis dysuria or hematuria.  Patient received a dose of Dilaudid in the ER and then her oxygen saturation dropped and she had to be started on 2 L of oxygen by nasal cannula.  I believe the Dilaudid caused some respiratory depression on top of her difficulty to take a deep breath anyways because of the pain secondary to fall has caused this acute respiratory failure.      Review of Systems   Constitutional: Negative for chills, fever and night sweats.   HENT:  Positive for congestion. Negative for hoarse voice and sore throat.    Eyes:  Negative for blurred vision and double vision.   Cardiovascular:  Positive for chest pain. Negative for dyspnea on exertion, leg swelling, near-syncope, orthopnea, palpitations and syncope.   Respiratory:   Positive for cough. Negative for hemoptysis, shortness of breath, sputum production and wheezing.    Endocrine: Negative for cold intolerance and heat intolerance.   Skin:  Negative for itching and rash.   Musculoskeletal:  Positive for back pain and myalgias. Negative for neck pain and stiffness.        Patient had a fall today and last fall was about a year ago.   Gastrointestinal:  Negative for abdominal pain, constipation, diarrhea, hematemesis, hematochezia, melena, nausea and vomiting.   Genitourinary:  Negative for dysuria, frequency, hematuria and urgency.   Neurological:  Negative for excessive daytime sleepiness, dizziness, focal weakness, headaches, light-headedness, loss of balance and weakness.   Psychiatric/Behavioral:  Negative for altered mental status, depression and hallucinations.           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    Depression     Disease of thyroid gland     Diverticulosis of colon 2018    Identified on colonoscopy    Dysphagia 12/2020    Dyspnea on exertion     Hemorrhoid     Hiatal hernia 12/2020    Hiatal hernia with GERD     large hiatal hernia with high-grade reflux on barium swallow; s/p laparoscopic fundoplication with gastropexy    History of diabetes mellitus     no meds now    Hyperlipidemia     Hypertension     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    aspiration pneumonia; completed abx therapy    OAB (overactive bladder)     Skin cancer     Sleep apnea     daughter states pt does not have and doesn't have machine       Past Surgical History:   Procedure Laterality Date    BLADDER SURGERY      BREAST BIOPSY      BRONCHOSCOPY N/A 09/13/2019    Procedure: BRONCHOSCOPY with bronchial washing;  Surgeon: Marnie Frankel MD;  Location: [facility] ENDOSCOPY;   Service: Pulmonary    COLONOSCOPY  07/19/2018    severe diverticulosis    CYST REMOVAL      Removed a fatty cyst off of her back    ENDOSCOPY N/A 11/23/2020    Procedure: ESOPHAGOGASTRODUODENOSCOPY with dilatation (Bougie # 48, 50, 52, 54, 56, 58);  Surgeon: Den Plummer DO;  Location: [facility] ENDOSCOPY;  Service: General;  Laterality: N/A;  Post: large hiatal hernia, joshua's ulcers    HIATAL HERNIA REPAIR N/A 12/30/2020    Procedure: Laparoscopic hiatal hernia repair with gastropexy;  Surgeon: Den Plummer DO;  Location: [facility] MAIN OR;  Service: General;  Laterality: N/A;    MASTECTOMY Right 02/04/2019    Invasive ductal carcinoma    TUBAL ABDOMINAL LIGATION         Family History: family history includes Arthritis in an other family member; Diabetes in her mother; Heart disease in an other family member. Otherwise pertinent FHx was reviewed and not pertinent to current issue.    Social History:  reports that she quit smoking about 31 years ago. Her smoking use included cigarettes. She started smoking about 33 years ago. She has a 1.00 pack-year smoking history. She has never used smokeless tobacco. She reports that she does not drink alcohol and does not use drugs.    Home Medications:  Prior to Admission Medications       Prescriptions Last Dose Informant Patient Reported? Taking?    Accu-Chek Softclix Lancets lancets   No No    1 each by Other route Daily. Use as instructed    Alcohol Swabs ([employer] Alcohol Prep Pads) 70 % pads   Yes No    APPLY 1 EACH TOPICALLY DAILY.    amLODIPine (NORVASC) 10 MG tablet   No No    Take 1 tablet by mouth Daily.    anastrozole (ARIMIDEX) 1 MG tablet   No No    Take 1 tablet by mouth Daily.    budesonide-formoterol (Symbicort) 80-4.5 MCG/ACT inhaler   No No    Inhale 2 puffs 2 (Two) Times a Day.    Patient not taking:  Reported on 9/15/2023    cetirizine (zyrTEC) 10 MG tablet   Yes No    Take 1 tablet by mouth every night at bedtime.    chlorthalidone (HYGROTON) 25 MG  tablet   No No    TAKE 1 TABLET BY MOUTH EVERY DAY    Cyanocobalamin (HM Super Vitamin B12) 2500 MCG chewable tablet   Yes No    Chew.    Patient not taking:  Reported on 9/15/2023    docusate sodium (COLACE) 100 MG capsule   Yes No    Take 1 capsule by mouth 2 (Two) Times a Day.    ferrous sulfate 324 (65 Fe) MG tablet delayed-release EC tablet   Yes No    Take 1 tablet by mouth 3 (Three) Times a Week.    glucose blood (Accu-Chek Guide) test strip   No No    1 each by Other route Daily. Use as instructed    Hearing Aid Batteries misc   No No    1 Units Every 7 (Seven) Days.    ipratropium-albuterol (DUO-NEB) 0.5-2.5 mg/3 ml nebulizer   No No    Take 3 mL by nebulization Every 6 (Six) Hours As Needed for Wheezing or Shortness of Air.    Patient not taking:  Reported on 9/15/2023    irbesartan (AVAPRO) 300 MG tablet   No No    TAKE 1 TABLET BY MOUTH EVERY DAY    Isopropyl Alcohol (Alcohol Wipes) 70 % misc   No No    Apply 1 each topically Daily.    levothyroxine (SYNTHROID, LEVOTHROID) 100 MCG tablet   No No    One tab po q day    LORazepam (ATIVAN) 0.5 MG tablet   No No    Take 1 tablet by mouth Every 8 (Eight) Hours As Needed for Anxiety.    Patient not taking:  Reported on 9/15/2023    metFORMIN (GLUCOPHAGE) 500 MG tablet   No No    Take 1 tablet by mouth Daily With Breakfast.    metoclopramide (REGLAN) 5 MG tablet   No No    Take 1 tablet by mouth 3 (Three) Times a Day As Needed (for nausea and vomiting).    mirtazapine (REMERON) 15 MG tablet   No No    Take 1 tablet by mouth every night at bedtime.    oxyCODONE (ROXICODONE) 5 MG immediate release tablet   Yes No    Take  by mouth See Admin Instructions. Take 1 tablet by mouth every 4-6 hours as needed for pain    Patient not taking:  Reported on 9/15/2023    pantoprazole (PROTONIX) 40 MG EC tablet   No No    TAKE 1 TABLET BY MOUTH EVERY MORNING BEFORE BREAKFAST. TAKE ON AN EMPTY STOMACH!    rOPINIRole (REQUIP) 0.25 MG tablet   No No    Take 1 tablet by mouth  Every Night. Take 1 hour before bedtime.    rosuvastatin (CRESTOR) 10 MG tablet   No No    Take 1 tablet by mouth Every Night.    sertraline (ZOLOFT) 100 MG tablet   No No    Take 1.5 tablets by mouth Daily.              Allergies:  No Known Allergies    Objective      Vitals:   Temp:  [97.8 °F (36.6 °C)] 97.8 °F (36.6 °C)  Heart Rate:  [71-80] 71  Resp:  [18-20] 18  BP: (159-175)/(69-74) 175/74  Flow (L/min):  [2] 2    Physical Exam  Constitutional:       General: She is not in acute distress.  HENT:      Head: Normocephalic and atraumatic.      Mouth/Throat:      Mouth: Mucous membranes are moist.      Pharynx: Oropharynx is clear.   Cardiovascular:      Rate and Rhythm: Normal rate and regular rhythm.      Pulses: Normal pulses.      Heart sounds: Normal heart sounds.   Pulmonary:      Effort: Pulmonary effort is normal.      Comments: Patient has scattered bilateral rhonchi.  Abdominal:      Palpations: Abdomen is soft.      Tenderness: There is no abdominal tenderness.   Musculoskeletal:      Cervical back: Normal range of motion.      Right lower leg: No edema.      Left lower leg: No edema.      Comments: Patient has significant right-sided chest wall tenderness   Skin:     General: Skin is warm and dry.   Neurological:      General: No focal deficit present.      Mental Status: She is alert.   Psychiatric:         Mood and Affect: Mood normal.         Behavior: Behavior normal.          Result Review    Result Review:  I have personally reviewed the results from the time of this admission to 10/10/2023 19:00 EDT and agree with these findings:  [x]  Laboratory  []  Microbiology  [x]  Radiology  []  EKG/Telemetry   []  Cardiology/Vascular   []  Pathology  []  Old records  []  Other:  Most notable findings include:   CBC shows WBC of 30.6, H&H of 11.7 and 38.1 platelets of 182.  CMP shows sodium of 142, potassium 5.2, chloride 110, carbon dioxide 20, BUN 37, creatinine 1.32 and glucose of 125.  LFTs were normal  except for alkaline phosphatase of 125.  ABGs on room air were 7.2 9/38/52.8/83.5%.  Respiratory panel showed human rhinovirus enterovirus positivity.  Chest x-ray showed no acute abnormality.      Assessment & Plan        Active Hospital Problems:  Active Hospital Problems    Diagnosis     **Acute respiratory failure      Plan:   83-year-old white female with a past medical history of hypertension, diabetes mellitus type 2 and breast cancer status postmastectomy comes in after a fall with right-sided chest pain and right upper back pain but negative chest x-ray, received Dilaudid in the ER and then went into mild acute respiratory failure.    #Acute respiratory failure:  Most likely secondary to some respiratory depression secondary to Dilaudid on top of difficulty breathing secondary to chest pain due to fall.  No more IV pain medications for her.  We will try to wean off oxygen.  If patient gets off the oxygen, hopefully home tomorrow.    #Fall with right-sided chest pain:  X-ray without any fractures.  Use Tylenol and oxycodone for pain control.    #DM2:  Diabetic diet.  Accu-Cheks and sliding scale insulin.    #Hypertension:  Continue home blood pressure medications.    #Breast cancer:  Stable at this point.  Continue home medications.    Patient is full code.    I have utilized all available immediate resources to obtain, update, or review the patient's current medications.   I confirmed that the patient's Advance Care Plan is present, code status is documented, or surrogate decision maker is listed in the patient's medical record.       DVT prophylaxis:  Medical DVT prophylaxis orders are present.    CODE STATUS:    Level Of Support Discussed With: Patient  Code Status (Patient has no pulse and is not breathing): CPR (Attempt to Resuscitate)  Medical Interventions (Patient has pulse or is breathing): Full Support    Admission Status:  I believe this patient meets observation status.    I discussed the  patient's findings and my recommendations with patient.        Signature: Electronically signed by Kalyan Mckeon MD, 10/10/23, 19:00 EDT.  [facility]ist Team

## 2023-10-11 PROBLEM — Z87.891 HISTORY OF CIGARETTE SMOKING: Chronic | Status: ACTIVE | Noted: 2023-10-11

## 2023-10-11 PROBLEM — B34.8 RHINOVIRUS INFECTION: Status: ACTIVE | Noted: 2023-10-11
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
## 2023-10-11 PROBLEM — Z85.6 PERSONAL HISTORY OF CLL (CHRONIC LYMPHOCYTIC LEUKEMIA): Chronic | Status: ACTIVE | Noted: 2023-10-11
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
## 2023-10-11 PROBLEM — J96.01 ACUTE HYPOXEMIC RESPIRATORY FAILURE: Status: ACTIVE | Noted: 2023-10-10

## 2023-10-11 PROBLEM — Z85.3 HISTORY OF BREAST CANCER: Chronic | Status: ACTIVE | Noted: 2023-10-11

## 2023-10-11 LAB
ANION GAP SERPL CALCULATED.3IONS-SCNC: 14 MMOL/L (ref 5–15)
BUN SERPL-MCNC: 36 MG/DL (ref 8–23)
BUN/CREAT SERPL: 30.5 (ref 7–25)
CALCIUM SPEC-SCNC: 9 MG/DL (ref 8.6–10.5)
CHLORIDE SERPL-SCNC: 107 MMOL/L (ref 98–107)
CO2 SERPL-SCNC: 16 MMOL/L (ref 22–29)
CREAT SERPL-MCNC: 1.18 MG/DL (ref 0.57–1)
DEPRECATED RDW RBC AUTO: 54.3 FL (ref 37–54)
EGFRCR SERPLBLD CKD-EPI 2021: 46.2 ML/MIN/1.73
EOSINOPHIL # BLD MANUAL: 0.26 10*3/MM3 (ref 0–0.4)
EOSINOPHIL NFR BLD MANUAL: 1 % (ref 0.3–6.2)
ERYTHROCYTE [DISTWIDTH] IN BLOOD BY AUTOMATED COUNT: 16.4 % (ref 12.3–15.4)
GLUCOSE BLDC GLUCOMTR-MCNC: 119 MG/DL (ref 70–105)
GLUCOSE BLDC GLUCOMTR-MCNC: 135 MG/DL (ref 70–105)
GLUCOSE BLDC GLUCOMTR-MCNC: 156 MG/DL (ref 70–105)
GLUCOSE BLDC GLUCOMTR-MCNC: 170 MG/DL (ref 70–105)
GLUCOSE SERPL-MCNC: 147 MG/DL (ref 65–99)
HCT VFR BLD AUTO: 34.4 % (ref 34–46.6)
HGB BLD-MCNC: 10.6 G/DL (ref 12–15.9)
LAB AP CASE REPORT: NORMAL
LYMPHOCYTES # BLD MANUAL: 18.15 10*3/MM3 (ref 0.7–3.1)
LYMPHOCYTES NFR BLD MANUAL: 2 % (ref 5–12)
MCH RBC QN AUTO: 27.3 PG (ref 26.6–33)
MCHC RBC AUTO-ENTMCNC: 30.8 G/DL (ref 31.5–35.7)
MCV RBC AUTO: 88.7 FL (ref 79–97)
MONOCYTES # BLD: 0.53 10*3/MM3 (ref 0.1–0.9)
NEUTROPHILS # BLD AUTO: 7.36 10*3/MM3 (ref 1.7–7)
NEUTROPHILS NFR BLD MANUAL: 26 % (ref 42.7–76)
NEUTS BAND NFR BLD MANUAL: 2 % (ref 0–5)
OVALOCYTES BLD QL SMEAR: ABNORMAL
PATH REPORT.FINAL DX SPEC: NORMAL
PLAT MORPH BLD: NORMAL
PLATELET # BLD AUTO: 158 10*3/MM3 (ref 140–450)
PMV BLD AUTO: 10.4 FL (ref 6–12)
POIKILOCYTOSIS BLD QL SMEAR: ABNORMAL
POTASSIUM SERPL-SCNC: 4.9 MMOL/L (ref 3.5–5.2)
RBC # BLD AUTO: 3.88 10*6/MM3 (ref 3.77–5.28)
SCAN SLIDE: NORMAL
SODIUM SERPL-SCNC: 137 MMOL/L (ref 136–145)
VARIANT LYMPHS NFR BLD MANUAL: 69 % (ref 19.6–45.3)
WBC MORPH BLD: NORMAL
WBC NRBC COR # BLD: 26.3 10*3/MM3 (ref 3.4–10.8)

## 2023-10-11 PROCEDURE — 97166 OT EVAL MOD COMPLEX 45 MIN: CPT

## 2023-10-11 PROCEDURE — 63710000001 INSULIN LISPRO (HUMAN) PER 5 UNITS: Performed by: INTERNAL MEDICINE

## 2023-10-11 PROCEDURE — 97162 PT EVAL MOD COMPLEX 30 MIN: CPT

## 2023-10-11 PROCEDURE — 25010000002 KETOROLAC TROMETHAMINE PER 15 MG: Performed by: FAMILY MEDICINE

## 2023-10-11 PROCEDURE — 80048 BASIC METABOLIC PNL TOTAL CA: CPT | Performed by: INTERNAL MEDICINE

## 2023-10-11 PROCEDURE — 25010000002 ENOXAPARIN PER 10 MG: Performed by: INTERNAL MEDICINE

## 2023-10-11 PROCEDURE — 36415 COLL VENOUS BLD VENIPUNCTURE: CPT | Performed by: INTERNAL MEDICINE

## 2023-10-11 PROCEDURE — 82948 REAGENT STRIP/BLOOD GLUCOSE: CPT

## 2023-10-11 PROCEDURE — G0378 HOSPITAL OBSERVATION PER HR: HCPCS

## 2023-10-11 PROCEDURE — 85007 BL SMEAR W/DIFF WBC COUNT: CPT | Performed by: INTERNAL MEDICINE

## 2023-10-11 PROCEDURE — 85025 COMPLETE CBC W/AUTO DIFF WBC: CPT | Performed by: INTERNAL MEDICINE

## 2023-10-11 RX ORDER — CYCLOBENZAPRINE HCL 10 MG
10 TABLET ORAL 3 TIMES DAILY PRN
Status: DISCONTINUED | OUTPATIENT
Start: 2023-10-11 | End: 2023-10-18

## 2023-10-11 RX ORDER — KETOROLAC TROMETHAMINE 30 MG/ML
15 INJECTION, SOLUTION INTRAMUSCULAR; INTRAVENOUS ONCE
Status: COMPLETED | OUTPATIENT
Start: 2023-10-11 | End: 2023-10-11

## 2023-10-11 RX ADMIN — ANASTROZOLE 1 MG: 1 TABLET ORAL at 08:22

## 2023-10-11 RX ADMIN — OXYCODONE HYDROCHLORIDE 5 MG: 5 TABLET ORAL at 16:17

## 2023-10-11 RX ADMIN — OXYCODONE HYDROCHLORIDE 5 MG: 5 TABLET ORAL at 08:25

## 2023-10-11 RX ADMIN — KETOROLAC TROMETHAMINE 15 MG: 30 INJECTION, SOLUTION INTRAMUSCULAR; INTRAVENOUS at 09:49

## 2023-10-11 RX ADMIN — LEVOTHYROXINE SODIUM 100 MCG: 0.1 TABLET ORAL at 05:12

## 2023-10-11 RX ADMIN — DOCUSATE SODIUM 50 MG AND SENNOSIDES 8.6 MG 2 TABLET: 8.6; 5 TABLET, FILM COATED ORAL at 08:23

## 2023-10-11 RX ADMIN — OXYCODONE HYDROCHLORIDE 5 MG: 5 TABLET ORAL at 22:18

## 2023-10-11 RX ADMIN — CYCLOBENZAPRINE 10 MG: 10 TABLET, FILM COATED ORAL at 19:12

## 2023-10-11 RX ADMIN — PANTOPRAZOLE SODIUM 40 MG: 40 TABLET, DELAYED RELEASE ORAL at 08:23

## 2023-10-11 RX ADMIN — Medication 10 ML: at 08:22

## 2023-10-11 RX ADMIN — AMLODIPINE BESYLATE 10 MG: 5 TABLET ORAL at 08:23

## 2023-10-11 RX ADMIN — ROPINIROLE HYDROCHLORIDE 0.25 MG: 0.25 TABLET, FILM COATED ORAL at 21:02

## 2023-10-11 RX ADMIN — INSULIN LISPRO 2 UNITS: 100 INJECTION, SOLUTION INTRAVENOUS; SUBCUTANEOUS at 08:22

## 2023-10-11 RX ADMIN — CETIRIZINE HYDROCHLORIDE 10 MG: 10 TABLET, FILM COATED ORAL at 21:02

## 2023-10-11 RX ADMIN — MIRTAZAPINE 15 MG: 15 TABLET, FILM COATED ORAL at 21:02

## 2023-10-11 RX ADMIN — ACETAMINOPHEN 650 MG: 325 TABLET, FILM COATED ORAL at 21:02

## 2023-10-11 RX ADMIN — Medication 10 ML: at 21:02

## 2023-10-11 RX ADMIN — SERTRALINE 150 MG: 100 TABLET, FILM COATED ORAL at 08:22

## 2023-10-11 RX ADMIN — FERROUS SULFATE TAB EC 324 MG (65 MG FE EQUIVALENT) 324 MG: 324 (65 FE) TABLET DELAYED RESPONSE at 08:23

## 2023-10-11 RX ADMIN — LOSARTAN POTASSIUM 100 MG: 50 TABLET, FILM COATED ORAL at 08:23

## 2023-10-11 RX ADMIN — ROSUVASTATIN 10 MG: 10 TABLET, FILM COATED ORAL at 21:02

## 2023-10-11 RX ADMIN — CHLORTHALIDONE 25 MG: 25 TABLET ORAL at 11:20

## 2023-10-11 RX ADMIN — CYCLOBENZAPRINE 10 MG: 10 TABLET, FILM COATED ORAL at 09:49

## 2023-10-11 RX ADMIN — ENOXAPARIN SODIUM 30 MG: 100 INJECTION SUBCUTANEOUS at 16:17

## 2023-10-11 NOTE — PROGRESS NOTES
[facility] Medicine Services   Daily Progress Note    Patient Name: Diane Guan  : 1940  MRN: [id number]  Primary Care Physician:  Asia Queen, SIOMARA  Date of admission: 10/10/2023    Subjective      Admitted for respiratory failure and right-sided chest wall pain after a fall at home.    Doing okay since admission, remains on low-flow supplemental oxygen.  She does have a history of nocturnal hypoxia and sleep apnea but is not on positive pressure ventilation at home.  She still complains of significant right-sided pain.  No fractures on admission imaging.  No follow-up labs were obtained yet this morning.  She would like to try some muscle relaxants to see if this helps with her chest wall pain.  I encouraged her to utilize the incentive spirometer, which she did during my visit with good participation.  Respiratory PCR panel was positive for rhinovirus, otherwise no evidence of consolidated infection despite a WBC count of 30.6 (although she does have a history of breast cancer and CLL and typically seems to run in the 20-25,000 range at baseline).  Daughter is at the bedside.  Discussed the case with the bedside nursing staff. The patient's case was also discussed with the pharmacy and case management providers at multidisciplinary rounds.  No other acute concerns.    Objective      Vitals:   Temp:  [97 °F (36.1 °C)-98 °F (36.7 °C)] 97.5 °F (36.4 °C)  Heart Rate:  [67-80] 71  Resp:  [15-20] 19  BP: (126-175)/(54-74) 132/54  Flow (L/min):  [2] 2    Physical Exam   GEN: WDWN elderly woman, no acute distress  HEENT: NCAT, PERRLA, moist mucous membranes  NECK: Supple, midline trachea  CARD: RRR, no peripheral edema  PULM: Fairly CTAB, diminished at the bases, pain and coughing with deep breathing, otherwise non-distressed--right-sided chest wall pain, no palpable areas of flail chest  ABD: soft, NTND, normoactive bowel sounds throughout  NEURO: Grossly intact, non-focal exam  PSYCH:  Pleasant, a bit anxious     Result Review    I have personally reviewed the results from the time of this admission to 10/11/2023 08:03 EDT and agree with these findings:  [x]  Laboratory  [x]  Microbiology  [x]  Radiology  [x]  EKG/Telemetry   [x]  Cardiology/Vascular   []  Pathology  [x]  Old records  []  Other:    XR Chest 2 View    Result Date: 10/10/2023  XR CHEST 2 VW Date of Exam: 10/10/2023 3:20 PM EDT Indication: fall; rib pain and congestion Comparison: 7/8/2021 Findings: Lines: None Lungs: Mild left basilar atelectasis. Otherwise clear Pleura: No pleural effusion or pneumothorax. Cardiomediastinum: The cardiomediastinal silhouette is normal Soft Tissues: Unremarkable. Bones: No acute osseous abnormality. Mild pectus deformity.     Impression: Impression: No acute abnormality. No definite evidence of a displaced rib fracture or pneumothorax. Electronically Signed: Jameel Ambrose DO  10/10/2023 3:29 PM EDT  Workstation ID: [id number]     Assessment & Plan      amLODIPine, 10 mg, Oral, Daily  anastrozole, 1 mg, Oral, Daily  cetirizine, 10 mg, Oral, Nightly  chlorthalidone, 25 mg, Oral, Daily  enoxaparin, 30 mg, Subcutaneous, Daily  ferrous sulfate, 324 mg, Oral, Once per day on Mon Wed Fri  insulin lispro, 2-7 Units, Subcutaneous, 4x Daily AC & at Bedtime  levothyroxine, 100 mcg, Oral, Q AM  losartan, 100 mg, Oral, Q24H  mirtazapine, 15 mg, Oral, Nightly  pantoprazole, 40 mg, Oral, QAM AC  rOPINIRole, 0.25 mg, Oral, Nightly  rosuvastatin, 10 mg, Oral, Nightly  senna-docusate sodium, 2 tablet, Oral, BID  sertraline, 150 mg, Oral, Daily  sodium chloride, 10 mL, Intravenous, Q12H             Active Hospital Problems    Diagnosis  POA    **Acute hypoxemic respiratory failure [J96.01]  Yes    History of breast cancer [Z85.3]  Not Applicable    Personal history of CLL (chronic lymphocytic leukemia) [Z85.6]  Not Applicable    History of cigarette smoking [Z87.891]  Not Applicable    Fall at home [W19.XXXA,  Y92.009]  Not Applicable    Rhinovirus infection [B34.8]  Yes    CKD (chronic kidney disease) stage 3, GFR 30-59 ml/min [N18.30]  Yes    COPD (chronic obstructive pulmonary disease) [J44.9]  Yes    LIBBY (obstructive sleep apnea) [G47.33]  Yes    Vitamin D deficiency [E55.9]  Yes    Vitamin B 12 deficiency [E53.8]  Yes    Overactive bladder [N32.81]  Yes    Primary osteoarthritis involving multiple joints [M15.9]  Yes    Mixed hyperlipidemia [E78.2]  Yes    Mixed anxiety and depressive disorder [F41.8]  Yes    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     Plan:   Avoid Dilaudid, oral pain medications as necessary  Continue aggressive pulmonary toilet  Encouraged incentive spirometry for sure, Flexeril at her request, and I added a dose of Toradol this morning, although cautiously as we need to follow her renal function  Continue home medications for her chronic medical conditions as clinically appropriate  A1c of 6 back in May, consistent carb diet with insulin and Accu-Cheks as ordered    PT/OT    DVT prophylaxis:  Medical DVT prophylaxis orders are present.    CODE STATUS:    Level Of Support Discussed With: Patient  Code Status (Patient has no pulse and is not breathing): CPR (Attempt to Resuscitate)  Medical Interventions (Patient has pulse or is breathing): Full Support    Disposition:  I expect patient to be discharged within 24 to 48 hours, may need some additional home health services at discharge depending on therapy recommendations.    Electronically signed by Vipin Whitlock MD, 10/11/23, 08:03 EDT.  [facility] Hospitalist Team

## 2023-10-11 NOTE — PLAN OF CARE
Problem: Skin Injury Risk Increased  Goal: Skin Health and Integrity  Outcome: Ongoing, Not Progressing  Intervention: Optimize Skin Protection  Recent Flowsheet Documentation  Taken 10/10/2023 2313 by Carine Aguayo RN  Head of Bed (HOB) Positioning: HOB at 20-30 degrees     Problem: Behavioral Health Comorbidity  Goal: Maintenance of Behavioral Health Symptom Control  Outcome: Ongoing, Not Progressing  Intervention: Maintain Behavioral Health Symptom Control  Recent Flowsheet Documentation  Taken 10/11/2023 0010 by Carine Aguayo RN  Medication Review/Management:   medications reviewed   high-risk medications identified  Taken 10/10/2023 2313 by Carine Aguayo RN  Medication Review/Management:   medications reviewed   high-risk medications identified     Problem: COPD (Chronic Obstructive Pulmonary Disease) Comorbidity  Goal: Maintenance of COPD Symptom Control  Outcome: Ongoing, Not Progressing  Intervention: Maintain COPD-Symptom Control  Recent Flowsheet Documentation  Taken 10/11/2023 0010 by Carine Aguayo RN  Medication Review/Management:   medications reviewed   high-risk medications identified  Taken 10/10/2023 2313 by Carine Aguayo RN  Medication Review/Management:   medications reviewed   high-risk medications identified     Problem: Diabetes Comorbidity  Goal: Blood Glucose Level Within Targeted Range  Outcome: Ongoing, Not Progressing  Intervention: Monitor and Manage Glycemia  Recent Flowsheet Documentation  Taken 10/10/2023 2313 by Carine Aguayo RN  Glycemic Management: blood glucose monitored     Problem: Obstructive Sleep Apnea Risk or Actual Comorbidity Management  Goal: Unobstructed Breathing During Sleep  Outcome: Ongoing, Not Progressing     Problem: Fall Injury Risk  Goal: Absence of Fall and Fall-Related Injury  Outcome: Ongoing, Not Progressing  Intervention: Identify and Manage Contributors  Recent Flowsheet Documentation  Taken 10/11/2023 0010 by Carine Aguayo RN  Medication  Review/Management:   medications reviewed   high-risk medications identified  Taken 10/10/2023 2313 by Carine Aguayo RN  Medication Review/Management:   medications reviewed   high-risk medications identified  Intervention: Promote Injury-Free Environment  Recent Flowsheet Documentation  Taken 10/11/2023 0010 by Carine Aguayo RN  Safety Promotion/Fall Prevention:   assistive device/personal items within reach   clutter free environment maintained   nonskid shoes/slippers when out of bed   room organization consistent   safety round/check completed  Taken 10/10/2023 2313 by Carine Aguayo RN  Safety Promotion/Fall Prevention:   assistive device/personal items within reach   clutter free environment maintained   nonskid shoes/slippers when out of bed   room organization consistent   safety round/check completed     Problem: Pain Acute  Goal: Acceptable Pain Control and Functional Ability  Outcome: Ongoing, Not Progressing  Intervention: Prevent or Manage Pain  Recent Flowsheet Documentation  Taken 10/11/2023 0010 by Carine Aguayo RN  Medication Review/Management:   medications reviewed   high-risk medications identified  Taken 10/10/2023 2313 by Carine Aguayo RN  Medication Review/Management:   medications reviewed   high-risk medications identified  Intervention: Develop Pain Management Plan  Recent Flowsheet Documentation  Taken 10/10/2023 2313 by Carine Aguayo RN  Pain Management Interventions:   care clustered   breathing exercises   diversional activity provided     Problem: Gas Exchange Impaired  Goal: Optimal Gas Exchange  Outcome: Ongoing, Not Progressing  Intervention: Optimize Oxygenation and Ventilation  Recent Flowsheet Documentation  Taken 10/10/2023 2313 by Carine Aguayo RN  Head of Bed (HOB) Positioning: HOB at 20-30 degrees   Goal Outcome Evaluation: no change    Alert and oriented x 4. Able to verbalize needs and wants. Hard of hearing. Takes medication whole and tolerates well. Continent  of bowel and bladder. Stand by assist for transfers/ambulation. C/O headache, PRN Tylenol administered with positive effect noted. Continues to require O2 therapy at 2 LPM via NC and tolerating well. Baseline O2 need is room air. No s/s of hyper/hypoglycemia noted. Daughter at bedside. On droplet precautions r/t positive Rhino virus. Currently in bed, eyes closed/rise and fall of chest observed. Call bell in reach. Discharge plan: awaiting case management note.                          Statement Selected

## 2023-10-11 NOTE — PLAN OF CARE
Goal Outcome Evaluation:  Plan of Care Reviewed With: patient, daughter           Outcome Evaluation: Pt is an 82 y/o F admitted for Fall resulting in right rib and back pain.  Xray (-).  Pt also found to have rhinovirus.  PMHx: DM 2, hypertension and breast cancer.  Pt lives in a one story home with 5 steps to enter with .  Pt was independent with ADLs, IADLs, transfers, mobility, and driving.  Pt has a RW, rollator, and cane but does not use.   Pt is in 8/10 pain on this date.  Pt t/f from supine to sitting on EOB with MIN-MOD A.  Pt completed sit to stand transfer with MIN-MOD A.   Pt presents with deficits in strength, balance, self care, pain, and increased falls risk indicating need for skilled OT services.  OT recommending home with home health and family assist as needed.  Pt also recommended to use RW.      Anticipated Discharge Disposition (OT): home with assist, home with home health

## 2023-10-11 NOTE — THERAPY EVALUATION
Patient Name: Diane Guan  : 1940    MRN: [id number]                              Today's Date: 10/11/2023       Admit Date: 10/10/2023    Visit Dx:     ICD-10-CM ICD-9-CM   1. Hypoxemia  R09.02 799.02   2. Chronic respiratory failure with hypoxia  J96.11 518.83     799.02   3. Rhinovirus infection  B34.8 079.3   4. Dyspnea, unspecified type  R06.00 786.09   5. Cough, unspecified type  R05.9 786.2   6. Rib pain on right side  R07.81 786.50   7. 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    Acquired hypothyroidism    Type 2 diabetes mellitus with stage 3b chronic kidney disease, without long-term current use of insulin    Insomnia    Postmenopausal status    Shoulder pain    Overactive bladder    Vitamin B 12 deficiency    Seasonal allergies    Absolute anemia    Vitamin D deficiency    LIBBY (obstructive sleep apnea)    Nocturnal hypoxia    Acute respiratory failure with hypoxia and hypercapnia    Abdominal pain    COPD (chronic obstructive pulmonary disease)    CKD (chronic kidney disease) stage 3, GFR 30-59 ml/min    Sepsis due to pneumonia    Acute on chronic respiratory failure with hypoxemia    Class 1 obesity due to excess calories with serious comorbidity and body mass index (BMI) of 32.0 to 32.9 in adult    Acute hypoxemic respiratory failure    History of breast cancer    Personal history of CLL (chronic lymphocytic leukemia)    History of cigarette smoking    Fall at home    Rhinovirus infection     Past Medical History:   Diagnosis Date    Acute bronchitis due to human  metapneumovirus 02/08/2020    Anxiety disorder     Arthritis     Breast cancer 02/2019    Invasive ductal carcinoma    Chronic lymphocytic leukemia (CLL), B-cell 01/2019    Depression     Disease of thyroid gland     Diverticulosis of colon 2018    Identified on colonoscopy    Dysphagia 12/2020    Dyspnea on exertion     Fall at home 10/11/2023    Hemorrhoid     Hiatal hernia 12/2020    Hiatal hernia with GERD     large hiatal hernia with high-grade reflux on barium swallow; s/p laparoscopic fundoplication with gastropexy    History of breast cancer 10/11/2023    History of cigarette smoking 10/11/2023    History of diabetes mellitus     no meds now    Hyperlipidemia     Hypertension     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    aspiration pneumonia; completed abx therapy    OAB (overactive bladder)     Personal history of CLL (chronic lymphocytic leukemia) 10/11/2023    Skin cancer     Sleep apnea     daughter states pt does not have and doesn't have machine     Past Surgical History:   Procedure Laterality Date    BLADDER SURGERY      BREAST BIOPSY      BRONCHOSCOPY N/A 09/13/2019    Procedure: BRONCHOSCOPY with bronchial washing;  Surgeon: Marnie Frankel MD;  Location: [facility] ENDOSCOPY;  Service: Pulmonary    COLONOSCOPY  07/19/2018    severe diverticulosis    CYST REMOVAL      Removed a fatty cyst off of her back    ENDOSCOPY N/A 11/23/2020    Procedure: ESOPHAGOGASTRODUODENOSCOPY with dilatation (Bougie # 48, 50, 52, 54, 56, 58);  Surgeon: Den Plummer DO;  Location: [facility] ENDOSCOPY;  Service: General;  Laterality: N/A;  Post: large hiatal hernia, joshua's ulcers    HIATAL HERNIA REPAIR N/A 12/30/2020    Procedure: Laparoscopic hiatal hernia repair with gastropexy;  Surgeon: Den Plummer DO;  Location: [facility] MAIN OR;  Service: General;  Laterality: N/A;    MASTECTOMY Right 02/04/2019    Invasive ductal carcinoma    TUBAL ABDOMINAL LIGATION        General Information       Row Name 10/11/23 7024           Physical Therapy Time and Intention    Document Type evaluation (P)   -DB     Mode of Treatment physical therapy (P)   -DB       Row Name 10/11/23 1408          General Information    Patient Profile Reviewed yes (P)   -DB     Prior Level of Function independent:;all household mobility;community mobility;driving;ADL's (P)   Pt reports IND with driving, ADL's, and mobility. Pt has rollator and cane available to use and uses them PRN.  -DB     Existing Precautions/Restrictions fall;oxygen therapy device and L/min (P)   Pt on 2 L/min upon entry. Not using O2 at home.  -DB     Barriers to Rehab none identified (P)   -DB       Row Name 10/11/23 1408          Living Environment    People in Home spouse (P)   -DB       Row Name 10/11/23 1408          Home Main Entrance    Number of Stairs, Main Entrance five (P)   -DB     Stair Railings, Main Entrance railings safe and in good condition (P)   -DB       Row Name 10/11/23 1408          Stairs Within Home, Primary    Number of Stairs, Within Home, Primary none (P)   -DB       Row Name 10/11/23 1408          Cognition    Orientation Status (Cognition) oriented x 4 (P)   -DB       Row Name 10/11/23 1408          Safety Issues, Functional Mobility    Impairments Affecting Function (Mobility) pain;coordination;strength;shortness of breath;balance (P)   -DB               User Key  (r) = Recorded By, (t) = Taken By, (c) = Cosigned By      Initials Name Provider Type    DB James Cantu, PT Student PT Student                   Mobility       Row Name 10/11/23 1410          Bed Mobility    Bed Mobility bed mobility (all) activities (P)   -DB     All Activities, [place] (Bed Mobility) modified independence (P)   -DB     Assistive Device (Bed Mobility) bed rails (P)   -DB       Row Name 10/11/23 1410          Sit-Stand Transfer    Sit-Stand [place] (Transfers) contact guard (P)   -DB     Assistive Device (Sit-Stand Transfers) walker, front-wheeled (P)   -DB       Row Name  10/11/23 1410          Gait/Stairs (Locomotion)    [place] Level (Gait) contact guard;minimum assist (75% patient effort) (P)   -DB     Assistive Device (Gait) walker, front-wheeled (P)   -DB     Patient was able to Ambulate yes (P)   -DB     Distance in Feet (Gait) 20' with rw, pt in severe pain throughout ambulation in R LE and R sided chest where she fell. Pt desaturated during ambulation dropping into upper 80s, vitals stable once returned to sitting and administered 2 L/min of O2. Pt required Min-A when turning d/t LOB to head back to EOB. (P)   -DB     Deviations/Abnormal Patterns (Gait) stride length decreased;base of support, narrow;antalgic (P)   -DB     Right Sided Gait Deviations weight shift ability decreased (P)   -DB               User Key  (r) = Recorded By, (t) = Taken By, (c) = Cosigned By      Initials Name Provider Type    James Frederick, PT Student PT Student                   Obj/Interventions       Row Name 10/11/23 1415          Range of Motion Comprehensive    Comment, General Range of Motion R UE limited d/t cancer treatment, L UE WFL, B LE WFL (P)   -DB       Row Name 10/11/23 1415          Strength Comprehensive (MMT)    Comment, General Manual Muscle Testing (MMT) Assessment B LE grossly 4/5 (P)   -DB       Row Name 10/11/23 1415          Balance    Balance Assessment sitting dynamic balance;standing dynamic balance (P)   -DB     Dynamic Sitting Balance independent (P)   -DB     Position, Sitting Balance sitting edge of bed (P)   -DB     Dynamic Standing Balance contact guard;minimal assist (P)   -DB     Position/Device Used, Standing Balance walker, rolling (P)   -DB               User Key  (r) = Recorded By, (t) = Taken By, (c) = Cosigned By      Initials Name Provider Type    James Frederick, PT Student PT Student                   Goals/Plan       Row Name 10/11/23 1431          Bed Mobility Goal 1 (PT)    Activity/Assistive Device (Bed Mobility Goal 1, PT) bed mobility  activities, all (P)   -DB     [place] Level/Cues Needed (Bed Mobility Goal 1, PT) independent (P)   -DB     Time Frame (Bed Mobility Goal 1, PT) long term goal (LTG);2 weeks (P)   -DB       Row Name 10/11/23 1431          Transfer Goal 1 (PT)    Activity/Assistive Device (Transfer Goal 1, PT) transfers, all (P)   -DB     [place] Level/Cues Needed (Transfer Goal 1, PT) independent (P)   -DB     Time Frame (Transfer Goal 1, PT) long term goal (LTG);2 weeks (P)   -DB       Row Name 10/11/23 1431          Gait Training Goal 1 (PT)    Activity/Assistive Device (Gait Training Goal 1, PT) gait (walking locomotion);walker, rolling (P)   -DB     [place] Level (Gait Training Goal 1, PT) standby assist (P)   -DB     Distance (Gait Training Goal 1, PT) 150' on room air with no drop in SpO2. (P)   -DB     Time Frame (Gait Training Goal 1, PT) long term goal (LTG);2 weeks (P)   -DB       Row Name 10/11/23 1431          Stairs Goal 1 (PT)    Activity/Assistive Device (Stairs Goal 1, PT) stairs, all skills (P)   -DB     [place] Level/Cues Needed (Stairs Goal 1, PT) standby assist (P)   -DB     Number of Stairs (Stairs Goal 1, PT) 5 (P)   -DB     Time Frame (Stairs Goal 1, PT) long term goal (LTG);2 weeks (P)   -DB       Row Name 10/11/23 1431          Therapy Assessment/Plan (PT)    Planned Therapy Interventions (PT) gait training;home exercise program;stair training;strengthening;patient/family education;neuromuscular re-education;transfer training (P)   -DB               User Key  (r) = Recorded By, (t) = Taken By, (c) = Cosigned By      Initials Name Provider Type    James Frederick, PT Student PT Student                   Clinical Impression       Row Name 10/11/23 9591          Pain    Pretreatment Pain Rating 6/10 (P)   -DB     Posttreatment Pain Rating 6/10 (P)   -DB     Pain Location - Side/Orientation Right (P)   R LE, R sided back/rib/chest area where she fell.  -DB       Row Name 10/11/23 4779           Plan of Care Review    Plan of Care Reviewed With patient (P)   -DB     Progress no change (P)   -DB     Outcome Evaluation Pt is a 82yo F who was [facility] to [facility]10[facility]23 with c/o of R sided rib and back pain d/t mechanical fall in bathroom of her home. CXR (-) for fx's. Pt PMH includes breast cancer. Pt also dx with rhinovirus upon admission. At baseline, pt living at home with  and IND with ADL's, driving, and mobility. Pt reports having cane and rollator at home and uses them PRN. Pt reports no O2 assistance at home, notes nebulizer use PRN. Pt reports having chronic issue with R LE and that it gives out on her at times, which led to this current fall. Currently, pt is AAOx4 and Mod. IND with bed mobility with bed-rail use noted, CGA for STS transfer and ambulating 20' with rw assistance. Pt demonstrated antalgic gait and decreased ability to weight shift onto R LE with c/o of pain throughout session. Pt desaturated during ambulation on room air, vitals stabilized upon EOB sitting and readministering 2 L/min O2. Pt had LOB on turn that required Min-A and tactile cueing to turn rw safely. PT recommends HHPT at this time d/t pt's  being able to assist if needed or pt going to live with daughter. PT needed for improving endurance, functional mobility, and improvement in pain while IP. (P)   -DB       Row Name 10/11/23 1417          Therapy Assessment/Plan (PT)    Criteria for Skilled Interventions Met (PT) yes;meets criteria (P)   -DB     Therapy Frequency (PT) 5 times/wk (P)   -DB     Predicted Duration of Therapy Intervention (PT) until d/c (P)   -DB       Row Name 10/11/23 1417          Vital Signs    Pre SpO2 (%) 95 (P)   -DB     O2 Delivery Pre Treatment supplemental O2 (P)   2 L/min  -DB     Intra SpO2 (%) 88 (P)   -DB     O2 Delivery Intra Treatment room air (P)   -DB     Post SpO2 (%) 93 (P)   -DB     O2 Delivery Post Treatment supplemental O2 (P)   2 L/min  -DB       Row Name 10/11/23  1417          Positioning and Restraints    Pre-Treatment Position in bed (P)   -DB     Post Treatment Position bed (P)   -DB     In Bed notified nsg;supine;call light within reach;encouraged to call for assist;exit alarm on (P)   -DB               User Key  (r) = Recorded By, (t) = Taken By, (c) = Cosigned By      Initials Name Provider Type    James Frederick, PT Student PT Student                   Outcome Measures       Row Name 10/11/23 1433 10/11/23 0825       How much help from another person do you currently need...    Turning from your back to your side while in flat bed without using bedrails? 3 (P)   -DB 4  -MM    Moving from lying on back to sitting on the side of a flat bed without bedrails? 3 (P)   -DB 4  -MM    Moving to and from a bed to a chair (including a wheelchair)? 4 (P)   -DB 4  -MM    Standing up from a chair using your arms (e.g., wheelchair, bedside chair)? 4 (P)   -DB 3  -MM    Climbing 3-5 steps with a railing? 2 (P)   -DB 3  -MM    To walk in hospital room? 3 (P)   -DB 3  -MM    AM-PAC 6 Clicks Score (PT) 19 (P)   -DB 21  -MM    Highest level of mobility 6 --> Walked 10 steps or more (P)   -DB 6 --> Walked 10 steps or more  -MM      Row Name 10/11/23 1312          Functional Assessment    Outcome Measure Options AM-PAC 6 Clicks Daily Activity (OT)  -MK               User Key  (r) = Recorded By, (t) = Taken By, (c) = Cosigned By      Initials Name Provider Type    Mallika Orlando, RN Registered Nurse    Kunal Oseguera OT Occupational Therapist    James Frederick, PT Student PT Student                                 Physical Therapy Education       Title: PT OT SLP Therapies (In Progress)       Topic: Physical Therapy (In Progress)       Point: Mobility training (Done)       Learning Progress Summary             Patient Acceptance, E,TB, VU by ARI at 10/11/2023 1433                         Point: Home exercise program (Not Started)       Learner Progress:  Not documented in this  visit.              Point: Body mechanics (Not Started)       Learner Progress:  Not documented in this visit.              Point: Precautions (Not Started)       Learner Progress:  Not documented in this visit.                              User Key       Initials Effective Dates Name Provider Type Discipline     08/16/23 -  James Cantu, PT Student PT Student PT                  PT Recommendation and Plan  Planned Therapy Interventions (PT): (P) gait training, home exercise program, stair training, strengthening, patient/family education, neuromuscular re-education, transfer training  Plan of Care Reviewed With: (P) patient  Progress: (P) no change  Outcome Evaluation: (P) Pt is a 84yo F who was admitted to [facility] on 10/10/23 with c/o of R sided rib and back pain d/t mechanical fall in bathroom of her home. CXR (-) for fx's. Pt PMH includes breast cancer. Pt also dx with rhinovirus upon admission. At baseline, pt living at home with  and IND with ADL's, driving, and mobility. Pt reports having cane and rollator at home and uses them PRN. Pt reports no O2 assistance at home, notes nebulizer use PRN. Pt reports having chronic issue with R LE and that it gives out on her at times, which led to this current fall. Currently, pt is AAOx4 and Mod. IND with bed mobility with bed-rail use noted, CGA for STS transfer and ambulating 20' with rw assistance. Pt demonstrated antalgic gait and decreased ability to weight shift onto R LE with c/o of pain throughout session. Pt desaturated during ambulation on room air, vitals stabilized upon EOB sitting and readministering 2 L/min O2. Pt had LOB on turn that required Min-A and tactile cueing to turn rw safely. PT recommends HHPT at this time d/t pt's  being able to assist if needed or pt going to live with daughter. PT needed for improving endurance, functional mobility, and improvement in pain while IP.     Time Calculation:         PT Charges       Row Name  10/11/23 1434             Time Calculation    Start Time 1343 (P)   -DB      Stop Time 1405 (P)   -DB      Time Calculation (min) 22 min (P)   -DB      PT Received On 10/11/23 (P)   -DB      PT - Next Appointment 10/12/23 (P)   -DB      PT Goal Re-Cert Due Date 10/25/23 (P)   -DB         Time Calculation- PT    Total Timed Code Minutes- PT 0 minute(s) (P)   -DB                User Key  (r) = Recorded By, (t) = Taken By, (c) = Cosigned By      Initials Name Provider Type    James Frederick, PT Student PT Student                  Therapy Charges for Today       Code Description Service Date Service Provider Modifiers Qty    [id number] HC PT EVAL MOD COMPLEXITY 4 10/11/2023 James Cantu, PT Student GP 1            PT G-Codes  Outcome Measure Options: AM-PAC 6 Clicks Daily Activity (OT)  AM-PAC 6 Clicks Score (PT): (P) 19  AM-PAC 6 Clicks Score (OT): 17  PT Discharge Summary  Anticipated Discharge Disposition (PT): (P) home with assist, home with home health    James Cantu, PT Student  10/11/2023

## 2023-10-11 NOTE — THERAPY EVALUATION
Patient Name: Diane Guan  : 1940    MRN: [id number]                              Today's Date: 10/11/2023       Admit Date: 10/10/2023    Visit Dx:     ICD-10-CM ICD-9-CM   1. Hypoxemia  R09.02 799.02   2. Chronic respiratory failure with hypoxia  J96.11 518.83     799.02   3. Rhinovirus infection  B34.8 079.3   4. Dyspnea, unspecified type  R06.00 786.09   5. Cough, unspecified type  R05.9 786.2   6. Rib pain on right side  R07.81 786.50   7. 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    Acquired hypothyroidism    Type 2 diabetes mellitus with stage 3b chronic kidney disease, without long-term current use of insulin    Insomnia    Postmenopausal status    Shoulder pain    Overactive bladder    Vitamin B 12 deficiency    Seasonal allergies    Absolute anemia    Vitamin D deficiency    LIBBY (obstructive sleep apnea)    Nocturnal hypoxia    Acute respiratory failure with hypoxia and hypercapnia    Abdominal pain    COPD (chronic obstructive pulmonary disease)    CKD (chronic kidney disease) stage 3, GFR 30-59 ml/min    Sepsis due to pneumonia    Acute on chronic respiratory failure with hypoxemia    Class 1 obesity due to excess calories with serious comorbidity and body mass index (BMI) of 32.0 to 32.9 in adult    Acute hypoxemic respiratory failure    History of breast cancer    Personal history of CLL (chronic lymphocytic leukemia)    History of cigarette smoking    Fall at home    Rhinovirus infection     Past Medical History:   Diagnosis Date    Acute bronchitis due to human  metapneumovirus 02/08/2020    Anxiety disorder     Arthritis     Breast cancer 02/2019    Invasive ductal carcinoma    Chronic lymphocytic leukemia (CLL), B-cell 01/2019    Depression     Disease of thyroid gland     Diverticulosis of colon 2018    Identified on colonoscopy    Dysphagia 12/2020    Dyspnea on exertion     Fall at home 10/11/2023    Hemorrhoid     Hiatal hernia 12/2020    Hiatal hernia with GERD     large hiatal hernia with high-grade reflux on barium swallow; s/p laparoscopic fundoplication with gastropexy    History of breast cancer 10/11/2023    History of cigarette smoking 10/11/2023    History of diabetes mellitus     no meds now    Hyperlipidemia     Hypertension     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    aspiration pneumonia; completed abx therapy    OAB (overactive bladder)     Personal history of CLL (chronic lymphocytic leukemia) 10/11/2023    Skin cancer     Sleep apnea     daughter states pt does not have and doesn't have machine     Past Surgical History:   Procedure Laterality Date    BLADDER SURGERY      BREAST BIOPSY      BRONCHOSCOPY N/A 09/13/2019    Procedure: BRONCHOSCOPY with bronchial washing;  Surgeon: Marnie Frankel MD;  Location: [facility] ENDOSCOPY;  Service: Pulmonary    COLONOSCOPY  07/19/2018    severe diverticulosis    CYST REMOVAL      Removed a fatty cyst off of her back    ENDOSCOPY N/A 11/23/2020    Procedure: ESOPHAGOGASTRODUODENOSCOPY with dilatation (Bougie # 48, 50, 52, 54, 56, 58);  Surgeon: Den Plummer DO;  Location: [facility] ENDOSCOPY;  Service: General;  Laterality: N/A;  Post: large hiatal hernia, joshua's ulcers    HIATAL HERNIA REPAIR N/A 12/30/2020    Procedure: Laparoscopic hiatal hernia repair with gastropexy;  Surgeon: Den Plummer DO;  Location: [facility] MAIN OR;  Service: General;  Laterality: N/A;    MASTECTOMY Right 02/04/2019    Invasive ductal carcinoma    TUBAL ABDOMINAL LIGATION        General Information       Row Name 10/11/23 1236          OT  Time and Intention    Document Type evaluation  -     Mode of Treatment occupational therapy  -       Row Name 10/11/23 1236          General Information    Prior Level of Function independent:;ADL's  -     Existing Precautions/Restrictions fall;oxygen therapy device and L/min  -     Barriers to Rehab none identified  -       Row Name 10/11/23 1236          Living Environment    People in Home spouse  -       Row Name 10/11/23 1236          Home Main Entrance    Number of Stairs, Main Entrance five  -     Stair Railings, Main Entrance railings safe and in good condition  -       Row Name 10/11/23 1236          Stairs Within Home, Primary    Number of Stairs, Within Home, Primary none  -       Row Name 10/11/23 1236          Cognition    Orientation Status (Cognition) oriented x 4  -       Row Name 10/11/23 1236          Safety Issues, Functional Mobility    Impairments Affecting Function (Mobility) balance;pain;strength;shortness of breath  -               User Key  (r) = Recorded By, (t) = Taken By, (c) = Cosigned By      Initials Name Provider Type    Kunal Oseguera OT Occupational Therapist                     Mobility/ADL's       Row Name 10/11/23 1244          Bed Mobility    Bed Mobility bed mobility (all) activities  -     All Activities, [place] (Bed Mobility) minimum assist (75% patient effort);moderate assist (50% patient effort)  -       Row Name 10/11/23 1244          Transfers    Transfers sit-stand transfer  -       Row Name 10/11/23 1244          Sit-Stand Transfer    Sit-Stand [place] (Transfers) minimum assist (75% patient effort);contact guard  -               User Key  (r) = Recorded By, (t) = Taken By, (c) = Cosigned By      Initials Name Provider Type    Kunal Oseguera OT Occupational Therapist                   Obj/Interventions       Row Name 10/11/23 1258          Range of Motion Comprehensive    General Range of Motion bilateral upper  extremity ROM WNL  -       Row Name 10/11/23 1258          Strength Comprehensive (MMT)    General Manual Muscle Testing (MMT) Assessment upper extremity strength deficits identified  -     Comment, General Manual Muscle Testing (MMT) Assessment BUE strength grossly 3+/5  -MK               User Key  (r) = Recorded By, (t) = Taken By, (c) = Cosigned By      Initials Name Provider Type    Kunal Oseguera, OT Occupational Therapist                   Goals/Plan       Row Name 10/11/23 1311          Bathing Goal 1 (OT)    Activity/Device (Bathing Goal 1, OT) bathing skills, all  -MK     [place] Level/Cues Needed (Bathing Goal 1, OT) modified independence  -MK     Time Frame (Bathing Goal 1, OT) 2 weeks  -       Row Name 10/11/23 1311          Dressing Goal 1 (OT)    Activity/Device (Dressing Goal 1, OT) dressing skills, all  -MK     [place]/Cues Needed (Dressing Goal 1, OT) modified independence  -MK     Time Frame (Dressing Goal 1, OT) 2 weeks  -       Row Name 10/11/23 1311          Toileting Goal 1 (OT)    Activity/Device (Toileting Goal 1, OT) toileting skills, all  -MK     [place] Level/Cues Needed (Toileting Goal 1, OT) modified independence  -MK     Time Frame (Toileting Goal 1, OT) 2 weeks  -       Row Name 10/11/23 1311          Therapy Assessment/Plan (OT)    Planned Therapy Interventions (OT) activity tolerance training;functional balance retraining;BADL retraining;neuromuscular control/coordination retraining;patient/caregiver education/training;transfer/mobility retraining;strengthening exercise;ROM/therapeutic exercise  -               User Key  (r) = Recorded By, (t) = Taken By, (c) = Cosigned By      Initials Name Provider Type    Kunal Oseguera, OT Occupational Therapist                   Clinical Impression       Row Name 10/11/23 1301          Pain Assessment    Pretreatment Pain Rating 8/10  -MK     Posttreatment Pain Rating 8/10  -     Pain Location -  Side/Orientation Right  -     Pain Location lower  -     Pain Location - back  -     Pain Intervention(s) Repositioned;Therapeutic presence  -       Row Name 10/11/23 1301          Plan of Care Review    Plan of Care Reviewed With patient;daughter  -     Outcome Evaluation Pt is an 84 y/o F admitted for Fall resulting in right rib and back pain.  Xray (-).  Pt also found to have rhinovirus.  PMHx: DM 2, hypertension and breast cancer.  Pt lives in a one story home with 5 steps to enter with .  Pt was independent with ADLs, IADLs, transfers, mobility, and driving.  Pt has a RW, rollator, and cane but does not use.   Pt is in 8/10 pain on this date.  Pt t/f from supine to sitting on EOB with MIN-MOD A.  Pt completed sit to stand transfer with MIN-MOD A.   Pt presents with deficits in strength, balance, self care, pain, and increased falls risk indicating need for skilled OT services.  OT recommending home with home health and family assist as needed.  Pt also recommended to use RW.  -       Row Name 10/11/23 1301          Therapy Assessment/Plan (OT)    Criteria for Skilled Therapeutic Interventions Met (OT) yes;meets criteria  -     Therapy Frequency (OT) 3 times/wk  -     Predicted Duration of Therapy Intervention (OT) until dc  -       Row Name 10/11/23 1301          Therapy Plan Review/Discharge Plan (OT)    Anticipated Discharge Disposition (OT) home with assist;home with home health  -       Row Name 10/11/23 1301          Positioning and Restraints    Pre-Treatment Position in bed  -     Post Treatment Position bed  -     In Bed notified nsg;call light within reach;encouraged to call for assist;exit alarm on;with family/caregiver  -               User Key  (r) = Recorded By, (t) = Taken By, (c) = Cosigned By      Initials Name Provider Type     Kunal Bullard, OT Occupational Therapist                   Outcome Measures       Row Name 10/11/23 1312          How much help  from another is currently needed...    Putting on and taking off regular lower body clothing? 2  -MK     Bathing (including washing, rinsing, and drying) 2  -MK     Toileting (which includes using toilet bed pan or urinal) 2  -MK     Putting on and taking off regular upper body clothing 3  -MK     Taking care of personal grooming (such as brushing teeth) 4  -MK     Eating meals 4  -MK     AM-PAC 6 Clicks Score (OT) 17  -MK       Row Name 10/11/23 0825          How much help from another person do you currently need...    Turning from your back to your side while in flat bed without using bedrails? 4  -MM     Moving from lying on back to sitting on the side of a flat bed without bedrails? 4  -MM     Moving to and from a bed to a chair (including a wheelchair)? 4  -MM     Standing up from a chair using your arms (e.g., wheelchair, bedside chair)? 3  -MM     Climbing 3-5 steps with a railing? 3  -MM     To walk in hospital room? 3  -MM     AM-PAC 6 Clicks Score (PT) 21  -MM     Highest level of mobility 6 --> Walked 10 steps or more  -MM       Row Name 10/11/23 1312          Functional Assessment    Outcome Measure Options AM-PAC 6 Clicks Daily Activity (OT)  -               User Key  (r) = Recorded By, (t) = Taken By, (c) = Cosigned By      Initials Name Provider Type    Mallika Orlando, RN Registered Nurse    Kunal Oseguera OT Occupational Therapist                    Occupational Therapy Education       Title: PT OT SLP Therapies (In Progress)       Topic: Occupational Therapy (In Progress)       Point: ADL training (Done)       Description:   Instruct learner(s) on proper safety adaptation and remediation techniques during self care or transfers.   Instruct in proper use of assistive devices.                  Learning Progress Summary             Patient Acceptance, E, VU by ETHAN at 10/11/2023 1312   Family Acceptance, E, VU by ETHAN at 10/11/2023 1312                         Point: Home exercise program (Not  Started)       Description:   Instruct learner(s) on appropriate technique for monitoring, assisting and/or progressing therapeutic exercises/activities.                  Learner Progress:  Not documented in this visit.              Point: Precautions (Not Started)       Description:   Instruct learner(s) on prescribed precautions during self-care and functional transfers.                  Learner Progress:  Not documented in this visit.              Point: Body mechanics (Not Started)       Description:   Instruct learner(s) on proper positioning and spine alignment during self-care, functional mobility activities and/or exercises.                  Learner Progress:  Not documented in this visit.                              User Key       Initials Effective Dates Name Provider Type Discipline     02/17/22 -  Kunal Bullard OT Occupational Therapist OT                  OT Recommendation and Plan  Planned Therapy Interventions (OT): activity tolerance training, functional balance retraining, BADL retraining, neuromuscular control/coordination retraining, patient/caregiver education/training, transfer/mobility retraining, strengthening exercise, ROM/therapeutic exercise  Therapy Frequency (OT): 3 times/wk  Plan of Care Review  Plan of Care Reviewed With: patient, daughter  Outcome Evaluation: Pt is an 84 y/o F admitted for Fall resulting in right rib and back pain.  Xray (-).  Pt also found to have rhinovirus.  PMHx: DM 2, hypertension and breast cancer.  Pt lives in a one story home with 5 steps to enter with .  Pt was independent with ADLs, IADLs, transfers, mobility, and driving.  Pt has a RW, rollator, and cane but does not use.   Pt is in 8/10 pain on this date.  Pt t/f from supine to sitting on EOB with MIN-MOD A.  Pt completed sit to stand transfer with MIN-MOD A.   Pt presents with deficits in strength, balance, self care, pain, and increased falls risk indicating need for skilled OT services.  OT  recommending home with home health and family assist as needed.  Pt also recommended to use RW.     Time Calculation:         Time Calculation- OT       Row Name 10/11/23 1313             Time Calculation- OT    OT Start Time 1124  -      OT Stop Time 1146  -      OT Time Calculation (min) 22 min  -      Total Timed Code Minutes- OT 0 minute(s)  -      OT Received On 10/11/23  -      OT - Next Appointment 10/12/23  -      OT Goal Re-Cert Due Date 10/25/23  -                User Key  (r) = Recorded By, (t) = Taken By, (c) = Cosigned By      Initials Name Provider Type    Kunal Oseguera OT Occupational Therapist                  Therapy Charges for Today       Code Description Service Date Service Provider Modifiers Qty    [id number] HC OT EVAL MOD COMPLEXITY 4 10/11/2023 Kunal Bullard OT GO 1                 Kunla Bullard OT  10/11/2023

## 2023-10-11 NOTE — PLAN OF CARE
Goal Outcome Evaluation:  Plan of Care Reviewed With: (P) patient        Progress: (P) no change  Outcome Evaluation: (P) Pt is a 82yo F who was admitted to [facility] on 10/10/23 with c/o of R sided rib and back pain d/t mechanical fall in bathroom of her home. CXR (-) for fx's. Pt PMH includes breast cancer. Pt also dx with rhinovirus upon admission. At baseline, pt living at home with  and IND with ADL's, driving, and mobility. Pt reports having cane and rollator at home and uses them PRN. Pt reports no O2 assistance at home, notes nebulizer use PRN. Pt reports having chronic issue with R LE and that it gives out on her at times, which led to this current fall. Currently, pt is AAOx4 and Mod. IND with bed mobility with bed-rail use noted, CGA for STS transfer and ambulating 20' with rw assistance. Pt demonstrated antalgic gait and decreased ability to weight shift onto R LE with c/o of pain throughout session. Pt desaturated during ambulation on room air, vitals stabilized upon EOB sitting and readministering 2 L/min O2. Pt had LOB on turn that required Min-A and tactile cueing to turn rw safely. PT recommends HHPT at this time d/t pt's  being able to assist if needed or pt going to live with daughter. PT needed for improving endurance, functional mobility, and improvement in pain while IP.      Anticipated Discharge Disposition (PT): (P) home with assist, home with home health

## 2023-10-11 NOTE — PLAN OF CARE
Goal Outcome Evaluation:   Patient is complaining of pain during shift in her right side from fall. Patient is A/Ox4. Patient resting in bed during shift, assist x1 to transfer.

## 2023-10-11 NOTE — CASE MANAGEMENT/SOCIAL WORK
Discharge Planning Assessment   [place]     Patient Name: Diane Guan  MRN: [id number]  Today's Date: 10/11/2023    Admit Date: 10/10/2023    Plan: From home with spouse. Possible HHC vs. rehab, pending therapy evals.   Discharge Needs Assessment       Row Name 10/11/23 1155       Living Environment    People in Home spouse    Name(s) of People in Home Esha    Current Living Arrangements home    Potentially Unsafe Housing Conditions none    Primary Care Provided by self    Provides Primary Care For no one    Family Caregiver if Needed child(marco), adult    Family Caregiver Names Daughters-Marianna, Harmony, Son-Jose    Quality of Family Relationships helpful;involved;supportive    Able to Return to Prior Arrangements yes       Resource/Environmental Concerns    Resource/Environmental Concerns none    Transportation Concerns none       Transition Planning    Patient/Family Anticipates Transition to home with help/services;home with family    Patient/Family Anticipated Services at Transition home health care;rehabilitation services    Transportation Anticipated family or friend will provide       Discharge Needs Assessment    Readmission Within the Last 30 Days no previous admission in last 30 days    Equipment Currently Used at Home hospital bed;commode;walker, rolling;shower chair;rollator;bp cuff;wheelchair;nebulizer;pulse ox    Concerns to be Addressed discharge planning;care coordination/care conferences    Anticipated Changes Related to Illness none    Equipment Needed After Discharge none    Provided Post Acute Provider List? Yes    Post Acute Provider List Home Health    Delivered To Support Person    Support Person Jamie    Method of Delivery In person                   Discharge Plan       Row Name 10/11/23 2958       Plan    Plan From home with spouse. Possible HHC vs. rehab, pending therapy evals.    Patient/Family in Agreement with Plan yes    Plan Comments CM met with patient and  daughter Marianna at bedside. Pt lives at home with her  Doron. She was driving and independent with ADL's prior to admission. PCP and pharmacy confirmed. DME includes hospital bed, walkers, wheelchair, nebulizer, and pulse ox. Pt previously wore O2 about a year ago, but that equipment has since been turned in. Pt is not current with HHC/PT services but daughter appears to think pt will need some type of services at discharge; not sure how much pt will agree to. Provided HHC list for daughter to discuss with pt and reviewed [facility] letter. Will follow up once therapy evaluations complete.             Expected Discharge Date and Time       Expected Discharge Date Expected Discharge Time    Oct 12, 2023            Demographic Summary       Row Name 10/11/23 1156       General Information    Admission Type observation    Arrived From emergency department    Required Notices Provided Observation Status Notice    Referral Source admission list    Reason for Consult discharge planning;care coordination/care conference    Preferred Language English       Contact Information    Permission Granted to Share Info With                    Functional Status       Row Name 10/11/23 1158       Functional Status    Usual Activity Tolerance moderate    Current Activity Tolerance moderate       Functional Status, IADL    Medications independent    Meal Preparation independent    Housekeeping independent    Laundry independent    Shopping independent             Megan Naegele, RN     Office Phone: [phone]  Office Cell: [phone]

## 2023-10-12 PROBLEM — J96.00 ACUTE RESPIRATORY FAILURE: Status: ACTIVE | Noted: 2023-10-12

## 2023-10-12 LAB
ANION GAP SERPL CALCULATED.3IONS-SCNC: 12 MMOL/L (ref 5–15)
BUN SERPL-MCNC: 42 MG/DL (ref 8–23)
BUN/CREAT SERPL: 27.8 (ref 7–25)
CALCIUM SPEC-SCNC: 9 MG/DL (ref 8.6–10.5)
CHLORIDE SERPL-SCNC: 106 MMOL/L (ref 98–107)
CO2 SERPL-SCNC: 18 MMOL/L (ref 22–29)
CREAT SERPL-MCNC: 1.51 MG/DL (ref 0.57–1)
EGFRCR SERPLBLD CKD-EPI 2021: 34.4 ML/MIN/1.73
GLUCOSE BLDC GLUCOMTR-MCNC: 107 MG/DL (ref 70–105)
GLUCOSE BLDC GLUCOMTR-MCNC: 117 MG/DL (ref 70–105)
GLUCOSE BLDC GLUCOMTR-MCNC: 130 MG/DL (ref 70–105)
GLUCOSE BLDC GLUCOMTR-MCNC: 141 MG/DL (ref 70–105)
GLUCOSE SERPL-MCNC: 121 MG/DL (ref 65–99)
POTASSIUM SERPL-SCNC: 5.7 MMOL/L (ref 3.5–5.2)
SODIUM SERPL-SCNC: 136 MMOL/L (ref 136–145)

## 2023-10-12 PROCEDURE — 94799 UNLISTED PULMONARY SVC/PX: CPT

## 2023-10-12 PROCEDURE — 97116 GAIT TRAINING THERAPY: CPT

## 2023-10-12 PROCEDURE — 25010000002 ENOXAPARIN PER 10 MG: Performed by: INTERNAL MEDICINE

## 2023-10-12 PROCEDURE — 25010000002 ONDANSETRON PER 1 MG: Performed by: INTERNAL MEDICINE

## 2023-10-12 PROCEDURE — 80048 BASIC METABOLIC PNL TOTAL CA: CPT | Performed by: FAMILY MEDICINE

## 2023-10-12 PROCEDURE — 25010000002 KETOROLAC TROMETHAMINE PER 15 MG: Performed by: FAMILY MEDICINE

## 2023-10-12 PROCEDURE — 25010000002 PROCHLORPERAZINE 10 MG/2ML SOLUTION: Performed by: FAMILY MEDICINE

## 2023-10-12 PROCEDURE — 82948 REAGENT STRIP/BLOOD GLUCOSE: CPT

## 2023-10-12 RX ORDER — PROCHLORPERAZINE EDISYLATE 5 MG/ML
5 INJECTION INTRAMUSCULAR; INTRAVENOUS ONCE
Status: COMPLETED | OUTPATIENT
Start: 2023-10-12 | End: 2023-10-12

## 2023-10-12 RX ORDER — KETOROLAC TROMETHAMINE 30 MG/ML
15 INJECTION, SOLUTION INTRAMUSCULAR; INTRAVENOUS ONCE
Status: COMPLETED | OUTPATIENT
Start: 2023-10-12 | End: 2023-10-12

## 2023-10-12 RX ORDER — ENOXAPARIN SODIUM 100 MG/ML
40 INJECTION SUBCUTANEOUS DAILY
Status: DISCONTINUED | OUTPATIENT
Start: 2023-10-12 | End: 2023-10-13

## 2023-10-12 RX ADMIN — KETOROLAC TROMETHAMINE 15 MG: 30 INJECTION, SOLUTION INTRAMUSCULAR; INTRAVENOUS at 09:42

## 2023-10-12 RX ADMIN — ONDANSETRON 4 MG: 2 INJECTION INTRAMUSCULAR; INTRAVENOUS at 06:05

## 2023-10-12 RX ADMIN — ROSUVASTATIN 10 MG: 10 TABLET, FILM COATED ORAL at 20:33

## 2023-10-12 RX ADMIN — POLYETHYLENE GLYCOL 3350 17 G: 17 POWDER, FOR SOLUTION ORAL at 17:48

## 2023-10-12 RX ADMIN — ACETAMINOPHEN 650 MG: 325 TABLET, FILM COATED ORAL at 20:33

## 2023-10-12 RX ADMIN — ANASTROZOLE 1 MG: 1 TABLET ORAL at 08:52

## 2023-10-12 RX ADMIN — CYCLOBENZAPRINE 10 MG: 10 TABLET, FILM COATED ORAL at 12:06

## 2023-10-12 RX ADMIN — PANTOPRAZOLE SODIUM 40 MG: 40 TABLET, DELAYED RELEASE ORAL at 08:51

## 2023-10-12 RX ADMIN — DOCUSATE SODIUM 50 MG AND SENNOSIDES 8.6 MG 2 TABLET: 8.6; 5 TABLET, FILM COATED ORAL at 20:33

## 2023-10-12 RX ADMIN — IPRATROPIUM BROMIDE AND ALBUTEROL SULFATE 3 ML: .5; 3 SOLUTION RESPIRATORY (INHALATION) at 21:42

## 2023-10-12 RX ADMIN — OXYCODONE HYDROCHLORIDE 5 MG: 5 TABLET ORAL at 12:06

## 2023-10-12 RX ADMIN — SERTRALINE 150 MG: 100 TABLET, FILM COATED ORAL at 08:51

## 2023-10-12 RX ADMIN — CYCLOBENZAPRINE 10 MG: 10 TABLET, FILM COATED ORAL at 17:43

## 2023-10-12 RX ADMIN — DOCUSATE SODIUM 50 MG AND SENNOSIDES 8.6 MG 2 TABLET: 8.6; 5 TABLET, FILM COATED ORAL at 08:51

## 2023-10-12 RX ADMIN — LOSARTAN POTASSIUM 100 MG: 50 TABLET, FILM COATED ORAL at 08:51

## 2023-10-12 RX ADMIN — CETIRIZINE HYDROCHLORIDE 10 MG: 10 TABLET, FILM COATED ORAL at 20:33

## 2023-10-12 RX ADMIN — AMLODIPINE BESYLATE 10 MG: 5 TABLET ORAL at 08:51

## 2023-10-12 RX ADMIN — MIRTAZAPINE 15 MG: 15 TABLET, FILM COATED ORAL at 20:33

## 2023-10-12 RX ADMIN — ACETAMINOPHEN 650 MG: 325 TABLET, FILM COATED ORAL at 02:13

## 2023-10-12 RX ADMIN — Medication 10 ML: at 08:51

## 2023-10-12 RX ADMIN — ENOXAPARIN SODIUM 40 MG: 100 INJECTION SUBCUTANEOUS at 17:43

## 2023-10-12 RX ADMIN — PROCHLORPERAZINE EDISYLATE 5 MG: 5 INJECTION INTRAMUSCULAR; INTRAVENOUS at 09:42

## 2023-10-12 RX ADMIN — Medication 10 ML: at 20:34

## 2023-10-12 RX ADMIN — OXYCODONE HYDROCHLORIDE 5 MG: 5 TABLET ORAL at 17:42

## 2023-10-12 RX ADMIN — CHLORTHALIDONE 25 MG: 25 TABLET ORAL at 08:51

## 2023-10-12 RX ADMIN — ROPINIROLE HYDROCHLORIDE 0.25 MG: 0.25 TABLET, FILM COATED ORAL at 20:33

## 2023-10-12 NOTE — PLAN OF CARE
Goal Outcome Evaluation:              Outcome Evaluation: Patient slept well during the night.  Complaints of pain on right side from fall.  PRN medications given see MAR.  Pt stable at this time.

## 2023-10-12 NOTE — CASE MANAGEMENT/SOCIAL WORK
Continued Stay Note  KELLY Bolton     Patient Name: Diane Guan  MRN: [id number]  Today's Date: 10/12/2023    Admit Date: 10/10/2023    Plan: Return home with spouse with possible HHC, choices pending.   Discharge Plan       Row Name 10/12/23 1340       Plan    Plan Return home with spouse with possible HHC, choices pending.    Patient/Family in Agreement with Plan yes    Plan Comments CM met with patient and daughter at bedside to follow up on therapy's recommendation of home health services at discharge. List remains at bedside with CM contact info. Pt has not been able to review list with her children yet. CM to follow up again tomorrow 10/13.             Megan Naegele, RN     Office Phone: [phone]  Office Cell: [phone]

## 2023-10-12 NOTE — PLAN OF CARE
Goal Outcome Evaluation:          Diane Guan presents with functional mobility impairments which indicate the need for skilled intervention. Tolerating session today without incident. Pt with improved ambulation distance and pattern but required more assist for bed mobility this date. Will continue to follow and progress as tolerated. She would benefit from  PT at discharge for continued strengthening.

## 2023-10-12 NOTE — THERAPY TREATMENT NOTE
"Subjective: Pt agreeable to therapeutic plan of care. Pt c/o fatigue but is willing to work.    Objective:     Bed mobility - Min-A  Transfers - Min-A  Ambulation - 30 feet with RW and Min-A  with occasionally mlid buckling R knee.     Pt instructed in shoulder shrugs for muscle relaxation; she required tactile cues to perform.  Pt noted to have shallow, rapid breathing.    PT educated in PLB as well as encouraged slowed respiratory rate.  Pt unable to follow cues for either but did exhibit mild improvement in nasal inspiration.     Vitals: SpO2 91-92% on 4L throughout session.    Pain: 3 VAS   Location: R flank  Intervention for pain: Increased Activity; pt and daughter report that she had received pain medication and muscle relaxant recently.     Education: Provided education on the importance of mobility in the acute care setting, Transfer Training, and Gait Training    Assessment: Diane Guan presents with functional mobility impairments which indicate the need for skilled intervention. Tolerating session today without incident. Pt with improved ambulation distance and pattern but required more assist for bed mobility this date. Will continue to follow and progress as tolerated. She would benefit from HH PT at discharge for continued strengthening.     Plan/Recommendations:   Low Intensity Therapy recommended post-acute care - This is recommended as therapy feels this patient would require 2-3 visits per week. OP or HH would be the best option depending on patient's home bound status. Consider, if the patient has other  \"skilled\" needs such as wounds, IV antibiotics, etc. Combined with \"low intensity\" could also equate to a SNF. If patient is medically complex, consider LTAC.. Pt requires no DME at discharge.     Pt desires Home with family assist and and Home Health at discharge. Pt cooperative; agreeable to therapeutic recommendations and plan of care.         Basic Mobility 6-click:  Rollin = " Total, A lot = 2, A little = 3; 4 = None  Supine>Sit:   1 = Total, A lot = 2, A little = 3; 4 = None   Sit>Stand with arms:  1 = Total, A lot = 2, A little = 3; 4 = None  Bed>Chair:   1 = Total, A lot = 2, A little = 3; 4 = None  Ambulate in room:  1 = Total, A lot = 2, A little = 3; 4 = None  3-5 Steps with railin = Total, A lot = 2, A little = 3; 4 = None  Score: 18    Modified [place]: N/A = No pre-op stroke/TIA    Post-Tx Position: Supine with HOB Elevated, Alarms activated, and Call light and personal items within reach  PPE: gloves, surgical mask, and gown

## 2023-10-12 NOTE — PLAN OF CARE
Goal Outcome Evaluation:   Patient requiring pain and nausea medication during shift. Patients nausea has improved. Patient still having pain in her right side of abdomen.

## 2023-10-12 NOTE — PROGRESS NOTES
[facility] Medicine Services   Daily Progress Note    Patient Name: Diane Guan  : 1940  MRN: [id number]  Primary Care Physician:  Asia Queen, SIOMARA  Date of admission: 10/10/2023    Subjective      Admitted for respiratory failure and right-sided chest wall pain after a fall at home.    Did okay overnight.  Having some nausea this morning.  Still having significant right-sided pain and debility.  Therapy has recommended home health and ongoing therapy at home.  I helped her get to the bedside chair today with the assistance of a walker, which was quite uncomfortable for her.  Discussed the case with the bedside nursing staff. The patient's case was also discussed with the pharmacy and case management providers at multidisciplinary rounds.  No other acute concerns.    Objective      Vitals:   Temp:  [97.4 °F (36.3 °C)-98 °F (36.7 °C)] 97.6 °F (36.4 °C)  Heart Rate:  [68-76] 73  Resp:  [16-21] 20  BP: (117-148)/(50-63) 135/53  Flow (L/min):  [1-3] 3    Physical Exam   GEN: WDWN elderly woman, mildly distressed with activity due to right-sided chest wall pain  HEENT: NCAT, PERRLA, moist mucous membranes  NECK: Supple, midline trachea  CARD: RRR, no peripheral edema  PULM: Fairly CTAB, diminished at the bases, pain and coughing with deep breathing, otherwise non-distressed--right-sided chest wall pain, no palpable areas of flail chest  ABD: soft, NTND, normoactive bowel sounds throughout  NEURO: Grossly intact, non-focal exam  PSYCH: Pleasant, a bit anxious     Result Review    I have personally reviewed the results from the time of this admission to 10/12/2023 07:48 EDT and agree with these findings:  [x]  Laboratory  [x]  Microbiology  [x]  Radiology  [x]  EKG/Telemetry   [x]  Cardiology/Vascular   []  Pathology  [x]  Old records  []  Other:  Wounds (last 24 hours)       LDA Wound       Row Name               [REMOVED] Wound 20 1037 abdomen Incision    Wound - Properties Group  Placement Date: 12/30/20 -AM Placement Time: 1037  -AM Location: abdomen  -AM Primary Wound Type: Incision  -AM Removal Date: 10/11/23  -MM Removal Time: 1014  -MM    Retired Wound - Properties Group Placement Date: 12/30/20 -AM Placement Time: 1037  -AM Location: abdomen  -AM Primary Wound Type: Incision  -AM Removal Date: 10/11/23  -MM Removal Time: 1014  -MM    Retired Wound - Properties Group Date first assessed: 12/30/20 -AM Time first assessed: 1037  -AM Location: abdomen  -AM Primary Wound Type: Incision  -AM Resolution Date: 10/11/23  -MM Resolution Time: 1014  -MM              User Key  (r) = Recorded By, (t) = Taken By, (c) = Cosigned By      Initials Name Provider Type    Ping Lanza, RN Registered Nurse    MM Mallika Mckeon, RN Registered Nurse                    XR Chest 2 View    Result Date: 10/10/2023  XR CHEST 2 VW Date of Exam: 10/10/2023 3:20 PM EDT Indication: fall; rib pain and congestion Comparison: 7/8/2021 Findings: Lines: None Lungs: Mild left basilar atelectasis. Otherwise clear Pleura: No pleural effusion or pneumothorax. Cardiomediastinum: The cardiomediastinal silhouette is normal Soft Tissues: Unremarkable. Bones: No acute osseous abnormality. Mild pectus deformity.     Impression: Impression: No acute abnormality. No definite evidence of a displaced rib fracture or pneumothorax. Electronically Signed: Jameel Ambrose DO  10/10/2023 3:29 PM EDT  Workstation ID: [id number]     Assessment & Plan      amLODIPine, 10 mg, Oral, Daily  anastrozole, 1 mg, Oral, Daily  cetirizine, 10 mg, Oral, Nightly  chlorthalidone, 25 mg, Oral, Daily  enoxaparin, 30 mg, Subcutaneous, Daily  ferrous sulfate, 324 mg, Oral, Once per day on Mon Wed Fri  insulin lispro, 2-7 Units, Subcutaneous, 4x Daily AC & at Bedtime  levothyroxine, 100 mcg, Oral, Q AM  losartan, 100 mg, Oral, Q24H  mirtazapine, 15 mg, Oral, Nightly  pantoprazole, 40 mg, Oral, QAM AC  rOPINIRole, 0.25 mg, Oral, Nightly  rosuvastatin,  10 mg, Oral, Nightly  senna-docusate sodium, 2 tablet, Oral, BID  sertraline, 150 mg, Oral, Daily  sodium chloride, 10 mL, Intravenous, Q12H             Active Hospital Problems    Diagnosis  POA    **Acute hypoxemic respiratory failure [J96.01]  Yes    History of breast cancer [Z85.3]  Not Applicable    Personal history of CLL (chronic lymphocytic leukemia) [Z85.6]  Not Applicable    History of cigarette smoking [Z87.891]  Not Applicable    Fall at home [W19.XXXA, Y92.009]  Not Applicable    Rhinovirus infection [B34.8]  Yes    CKD (chronic kidney disease) stage 3, GFR 30-59 ml/min [N18.30]  Yes    COPD (chronic obstructive pulmonary disease) [J44.9]  Yes    LIBBY (obstructive sleep apnea) [G47.33]  Yes    Vitamin D deficiency [E55.9]  Yes    Vitamin B 12 deficiency [E53.8]  Yes    Overactive bladder [N32.81]  Yes    Primary osteoarthritis involving multiple joints [M15.9]  Yes    Mixed hyperlipidemia [E78.2]  Yes    Mixed anxiety and depressive disorder [F41.8]  Yes    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     Plan:   Avoid Dilaudid, oral pain medications as necessary  Continue aggressive pulmonary toilet  Antiemetic medications as necessary--needed Compazine in addition to Zofran this morning before getting relief  Encouraged incentive spirometry for sure, Flexeril at her request.  Renal function is a bit better today, so I will add another dose of IV Toradol especially given her nausea and inability to take oral pain medication today.  We will continue to watch her renal function closely.  Continue home medications for her chronic medical conditions as  clinically appropriate  A1c of 6 back in May, consistent carb diet with insulin and Accu-Cheks as ordered    PT/OT    DVT prophylaxis:  Medical DVT prophylaxis orders are present.    CODE STATUS:    Level Of Support Discussed With: Patient  Code Status (Patient has no pulse and is not breathing): CPR (Attempt to Resuscitate)  Medical Interventions (Patient has pulse or is breathing): Full Support    Disposition:  I expect patient to be discharged within 24 to 48 hours, and she will need some additional home health services at discharge based on therapy recommendations.    Electronically signed by Vipin Whitlock MD, 10/12/23, 07:48 EDT.  [facility] Hospitalist Team

## 2023-10-13 ENCOUNTER — APPOINTMENT (OUTPATIENT)
Dept: GENERAL RADIOLOGY | Facility: HOSPITAL | Age: 83
DRG: 207 | End: 2023-10-13
Payer: MEDICARE

## 2023-10-13 LAB
ANION GAP SERPL CALCULATED.3IONS-SCNC: 10 MMOL/L (ref 5–15)
ARTERIAL PATENCY WRIST A: POSITIVE
ATMOSPHERIC PRESS: ABNORMAL MM[HG]
BASE EXCESS BLDA CALC-SCNC: -7.9 MMOL/L (ref 0–3)
BDY SITE: ABNORMAL
BUN SERPL-MCNC: 45 MG/DL (ref 8–23)
BUN/CREAT SERPL: 29.6 (ref 7–25)
CALCIUM SPEC-SCNC: 9.3 MG/DL (ref 8.6–10.5)
CHLORIDE SERPL-SCNC: 105 MMOL/L (ref 98–107)
CO2 BLDA-SCNC: 22.5 MMOL/L (ref 22–29)
CO2 SERPL-SCNC: 23 MMOL/L (ref 22–29)
CREAT SERPL-MCNC: 1.52 MG/DL (ref 0.57–1)
EGFRCR SERPLBLD CKD-EPI 2021: 34.1 ML/MIN/1.73
GLUCOSE BLDC GLUCOMTR-MCNC: 105 MG/DL (ref 70–105)
GLUCOSE BLDC GLUCOMTR-MCNC: 116 MG/DL (ref 70–105)
GLUCOSE BLDC GLUCOMTR-MCNC: 120 MG/DL (ref 70–105)
GLUCOSE BLDC GLUCOMTR-MCNC: 124 MG/DL (ref 70–105)
GLUCOSE SERPL-MCNC: 130 MG/DL (ref 65–99)
HCO3 BLDA-SCNC: 20.8 MMOL/L (ref 21–28)
HEMODILUTION: NO
INHALED O2 CONCENTRATION: 40 %
MODALITY: ABNORMAL
PCO2 BLDA: 54.9 MM HG (ref 35–48)
PH BLDA: 7.19 PH UNITS (ref 7.35–7.45)
PO2 BLDA: 69.7 MM HG (ref 83–108)
POTASSIUM SERPL-SCNC: 5.7 MMOL/L (ref 3.5–5.2)
POTASSIUM SERPL-SCNC: 6.3 MMOL/L (ref 3.5–5.2)
SAO2 % BLDCOA: 88.6 % (ref 94–98)
SODIUM SERPL-SCNC: 138 MMOL/L (ref 136–145)

## 2023-10-13 PROCEDURE — 85007 BL SMEAR W/DIFF WBC COUNT: CPT | Performed by: PHYSICIAN ASSISTANT

## 2023-10-13 PROCEDURE — 25010000002 CALCIUM GLUCONATE-NACL 1-0.675 GM/50ML-% SOLUTION: Performed by: PHYSICIAN ASSISTANT

## 2023-10-13 PROCEDURE — 63710000001 INSULIN REGULAR HUMAN PER 5 UNITS: Performed by: PHYSICIAN ASSISTANT

## 2023-10-13 PROCEDURE — 93005 ELECTROCARDIOGRAM TRACING: CPT | Performed by: FAMILY MEDICINE

## 2023-10-13 PROCEDURE — 25010000002 ENOXAPARIN PER 10 MG: Performed by: INTERNAL MEDICINE

## 2023-10-13 PROCEDURE — 83036 HEMOGLOBIN GLYCOSYLATED A1C: CPT | Performed by: NURSE PRACTITIONER

## 2023-10-13 PROCEDURE — 36600 WITHDRAWAL OF ARTERIAL BLOOD: CPT

## 2023-10-13 PROCEDURE — 85025 COMPLETE CBC W/AUTO DIFF WBC: CPT | Performed by: PHYSICIAN ASSISTANT

## 2023-10-13 PROCEDURE — 83605 ASSAY OF LACTIC ACID: CPT | Performed by: PHYSICIAN ASSISTANT

## 2023-10-13 PROCEDURE — 93010 ELECTROCARDIOGRAM REPORT: CPT | Performed by: STUDENT IN AN ORGANIZED HEALTH CARE EDUCATION/TRAINING PROGRAM

## 2023-10-13 PROCEDURE — 80048 BASIC METABOLIC PNL TOTAL CA: CPT | Performed by: FAMILY MEDICINE

## 2023-10-13 PROCEDURE — 97116 GAIT TRAINING THERAPY: CPT

## 2023-10-13 PROCEDURE — 82803 BLOOD GASES ANY COMBINATION: CPT

## 2023-10-13 PROCEDURE — 25010000002 KETOROLAC TROMETHAMINE PER 15 MG: Performed by: FAMILY MEDICINE

## 2023-10-13 PROCEDURE — 71045 X-RAY EXAM CHEST 1 VIEW: CPT

## 2023-10-13 PROCEDURE — 84132 ASSAY OF SERUM POTASSIUM: CPT | Performed by: FAMILY MEDICINE

## 2023-10-13 PROCEDURE — 97530 THERAPEUTIC ACTIVITIES: CPT

## 2023-10-13 PROCEDURE — 25810000003 DEXTROSE-NACL PER 500 ML: Performed by: FAMILY MEDICINE

## 2023-10-13 PROCEDURE — 82948 REAGENT STRIP/BLOOD GLUCOSE: CPT

## 2023-10-13 RX ORDER — KETOROLAC TROMETHAMINE 15 MG/ML
15 INJECTION, SOLUTION INTRAMUSCULAR; INTRAVENOUS ONCE
Status: COMPLETED | OUTPATIENT
Start: 2023-10-13 | End: 2023-10-13

## 2023-10-13 RX ORDER — DEXTROSE AND SODIUM CHLORIDE 5; .9 G/100ML; G/100ML
75 INJECTION, SOLUTION INTRAVENOUS CONTINUOUS
Status: DISCONTINUED | OUTPATIENT
Start: 2023-10-13 | End: 2023-10-14

## 2023-10-13 RX ORDER — DEXTROSE MONOHYDRATE 25 G/50ML
25 INJECTION, SOLUTION INTRAVENOUS ONCE
Status: COMPLETED | OUTPATIENT
Start: 2023-10-13 | End: 2023-10-13

## 2023-10-13 RX ORDER — OXYCODONE HYDROCHLORIDE 5 MG/1
5 TABLET ORAL EVERY 6 HOURS PRN
Status: DISCONTINUED | OUTPATIENT
Start: 2023-10-13 | End: 2023-10-18

## 2023-10-13 RX ORDER — MORPHINE SULFATE 2 MG/ML
1 INJECTION, SOLUTION INTRAMUSCULAR; INTRAVENOUS EVERY 4 HOURS PRN
Status: DISCONTINUED | OUTPATIENT
Start: 2023-10-13 | End: 2023-10-17

## 2023-10-13 RX ORDER — ENOXAPARIN SODIUM 100 MG/ML
30 INJECTION SUBCUTANEOUS DAILY
Status: DISCONTINUED | OUTPATIENT
Start: 2023-10-13 | End: 2023-10-25

## 2023-10-13 RX ORDER — CALCIUM GLUCONATE 20 MG/ML
1000 INJECTION, SOLUTION INTRAVENOUS ONCE
Status: COMPLETED | OUTPATIENT
Start: 2023-10-13 | End: 2023-10-13

## 2023-10-13 RX ADMIN — SODIUM ZIRCONIUM CYCLOSILICATE 10 G: 10 POWDER, FOR SUSPENSION ORAL at 22:37

## 2023-10-13 RX ADMIN — Medication 10 ML: at 20:27

## 2023-10-13 RX ADMIN — SERTRALINE 150 MG: 100 TABLET, FILM COATED ORAL at 08:45

## 2023-10-13 RX ADMIN — DEXTROSE MONOHYDRATE 25 G: 25 INJECTION, SOLUTION INTRAVENOUS at 23:13

## 2023-10-13 RX ADMIN — AMLODIPINE BESYLATE 10 MG: 5 TABLET ORAL at 08:45

## 2023-10-13 RX ADMIN — DOCUSATE SODIUM 50 MG AND SENNOSIDES 8.6 MG 2 TABLET: 8.6; 5 TABLET, FILM COATED ORAL at 08:45

## 2023-10-13 RX ADMIN — OXYCODONE HYDROCHLORIDE 5 MG: 5 TABLET ORAL at 12:20

## 2023-10-13 RX ADMIN — Medication 10 ML: at 08:46

## 2023-10-13 RX ADMIN — DEXTROSE AND SODIUM CHLORIDE 75 ML/HR: 5; 900 INJECTION, SOLUTION INTRAVENOUS at 17:08

## 2023-10-13 RX ADMIN — LOSARTAN POTASSIUM 100 MG: 50 TABLET, FILM COATED ORAL at 08:46

## 2023-10-13 RX ADMIN — PANTOPRAZOLE SODIUM 40 MG: 40 TABLET, DELAYED RELEASE ORAL at 08:46

## 2023-10-13 RX ADMIN — ROSUVASTATIN 10 MG: 10 TABLET, FILM COATED ORAL at 20:27

## 2023-10-13 RX ADMIN — CYCLOBENZAPRINE 10 MG: 10 TABLET, FILM COATED ORAL at 09:19

## 2023-10-13 RX ADMIN — OXYCODONE HYDROCHLORIDE 5 MG: 5 TABLET ORAL at 00:04

## 2023-10-13 RX ADMIN — DOCUSATE SODIUM 50 MG AND SENNOSIDES 8.6 MG 2 TABLET: 8.6; 5 TABLET, FILM COATED ORAL at 20:26

## 2023-10-13 RX ADMIN — ROPINIROLE HYDROCHLORIDE 0.25 MG: 0.25 TABLET, FILM COATED ORAL at 20:27

## 2023-10-13 RX ADMIN — MIRTAZAPINE 15 MG: 15 TABLET, FILM COATED ORAL at 20:26

## 2023-10-13 RX ADMIN — ANASTROZOLE 1 MG: 1 TABLET ORAL at 08:44

## 2023-10-13 RX ADMIN — CETIRIZINE HYDROCHLORIDE 10 MG: 10 TABLET, FILM COATED ORAL at 20:26

## 2023-10-13 RX ADMIN — FERROUS SULFATE TAB EC 324 MG (65 MG FE EQUIVALENT) 324 MG: 324 (65 FE) TABLET DELAYED RESPONSE at 08:45

## 2023-10-13 RX ADMIN — CYCLOBENZAPRINE 10 MG: 10 TABLET, FILM COATED ORAL at 20:26

## 2023-10-13 RX ADMIN — LEVOTHYROXINE SODIUM 100 MCG: 0.1 TABLET ORAL at 06:19

## 2023-10-13 RX ADMIN — KETOROLAC TROMETHAMINE 15 MG: 15 INJECTION, SOLUTION INTRAMUSCULAR; INTRAVENOUS at 17:07

## 2023-10-13 RX ADMIN — CALCIUM GLUCONATE 1000 MG: 20 INJECTION, SOLUTION INTRAVENOUS at 22:33

## 2023-10-13 RX ADMIN — CHLORTHALIDONE 25 MG: 25 TABLET ORAL at 08:45

## 2023-10-13 RX ADMIN — INSULIN HUMAN 5 UNITS: 100 INJECTION, SOLUTION PARENTERAL at 23:20

## 2023-10-13 RX ADMIN — ENOXAPARIN SODIUM 30 MG: 100 INJECTION SUBCUTANEOUS at 17:07

## 2023-10-13 RX ADMIN — OXYCODONE HYDROCHLORIDE 5 MG: 5 TABLET ORAL at 06:19

## 2023-10-13 NOTE — PLAN OF CARE
Assessment: Diane Guan presents with functional mobility impairments which indicate the need for skilled intervention. Pt demonstrated physical decline this session. Pt reports pain to be worsening at 10/10. Pt remains on 5 L/min at rest, used 6L/min for ambulation, vitals stable throughout session. Pt educated on splinting chest with pillow when needing to cough or stand up to decrease pain. PT changing recommendations to SNF at d/c d/t physical decline and unsafe ambulation demonstrated, pt and daughter agreeable with plan and states she feels unsafe to return home at this time. Will follow up while IP.

## 2023-10-13 NOTE — THERAPY TREATMENT NOTE
"Subjective: Pt agreeable to therapeutic plan of care. Pt reports worsened pain this session, 10/10.     Objective:     Bed mobility - N/A or Not attempted.  Transfers - Mod-A and with rolling walker pt required excessive cueing for hand placement and scooting.   Ambulation - 30 feet Min-A and with rolling walker, pt required tactile cues to advance rw.     Vitals: WNL, pt on 5 L/min at rest, placed on 6 L/min for ambulation.   Pre-ambulation SpO2 - 91%  Post-ambulation SpO2 - 92%    Pain: 10 VAS   Location: R side chest/ribcage  Intervention for pain: Repositioned and Increased Activity and used pillow for splint    Education: Provided education on the importance of mobility in the acute care setting, Verbal/Tactile Cues, Transfer Training, Gait Training, and Energy conservation strategies    Assessment: Diane Guan presents with functional mobility impairments which indicate the need for skilled intervention. Pt demonstrated physical decline this session. Pt reports pain to be worsening at 10/10. Pt remains on 5 L/min at rest, used 6L/min for ambulation, vitals stable throughout session. Pt educated on splinting chest with pillow when needing to cough or stand up to decrease pain. PT changing recommendations to SNF at d/c d/t physical decline and unsafe ambulation demonstrated, pt and daughter agreeable with plan and states she feels unsafe to return home at this time. Will follow up while IP.     Plan/Recommendations:   Moderate Intensity Therapy recommended post-acute care. This is recommended as therapy feels the patient would require 3-4 days per week and wouldn't tolerate \"3 hour daily\" rehab intensity. SNF would be the preferred choice. If the patient does not agree to SNF, arrange HH or OP depending on home bound status. If patient is medically complex, consider LTACH.. Pt requires no DME at discharge.     Pt desires Skilled Rehab placement at discharge. Pt cooperative; agreeable to therapeutic " recommendations and plan of care.         Basic Mobility 6-click:  Rollin = Total, A lot = 2, A little = 3; 4 = None  Supine>Sit:   1 = Total, A lot = 2, A little = 3; 4 = None   Sit>Stand with arms:  1 = Total, A lot = 2, A little = 3; 4 = None  Bed>Chair:   1 = Total, A lot = 2, A little = 3; 4 = None  Ambulate in room:  1 = Total, A lot = 2, A little = 3; 4 = None  3-5 Steps with railin = Total, A lot = 2, A little = 3; 4 = None  Score: 14    Modified [place]: N/A = No pre-op stroke/TIA    Post-Tx Position: Up in Chair, Alarms activated, and Call light and personal items within reach  PPE: gloves, surgical mask, and gown

## 2023-10-13 NOTE — PLAN OF CARE
Goal Outcome Evaluation:      Patient resting well through the night. Patient complained of pain in her right side due to her fall, see MAR. Patient has no new complaints at this time. Patient is stable at this time.

## 2023-10-13 NOTE — PROGRESS NOTES
[facility] Medicine Services   Daily Progress Note    Patient Name: Diane Guan  : 1940  MRN: [id number]  Primary Care Physician:  Asia Queen, APRN  Date of admission: 10/10/2023    Subjective      Admitted for respiratory failure and right-sided chest wall pain after a fall at home.    Did okay overnight.  Nausea has resolved. Still having significant right-sided pain and debility.  Increased oxygen needs overnight, but weaning back down today. PT is now recommending SNF. K is still a bit elevated (yesterday was hemolyzed).  Discussed the case with the bedside nursing staff. The patient's case was also discussed with the pharmacy and case management providers at multidisciplinary rounds.  No other acute concerns.    Objective      Vitals:   Temp:  [97.5 °F (36.4 °C)-99.1 °F (37.3 °C)] 97.6 °F (36.4 °C)  Heart Rate:  [75-98] 82  Resp:  [20-28] 23  BP: (101-134)/(46-61) 134/52  Flow (L/min):  [3-7] 7    Physical Exam   GEN: WDWN elderly woman, tired appearing, no acute distress  HEENT: NCAT, PERRLA, moist mucous membranes  NECK: Supple, midline trachea  CARD: RRR, no peripheral edema  PULM: Somewhat coarse bilateral breath sounds, diminished at the bases, pain and coughing with deep breathing, otherwise non-distressed--right-sided chest wall pain, no palpable areas of flail chest  ABD: soft, NTND, normoactive bowel sounds throughout  NEURO: Grossly intact, non-focal exam  PSYCH: Pleasant, a bit anxious     Result Review    I have personally reviewed the results from the time of this admission to 10/13/2023 08:09 EDT and agree with these findings:  [x]  Laboratory  [x]  Microbiology  [x]  Radiology  [x]  EKG/Telemetry   [x]  Cardiology/Vascular   []  Pathology  [x]  Old records  []  Other:  Wounds (last 24 hours)             XR Chest 2 View    Result Date: 10/10/2023  XR CHEST 2 VW Date of Exam: 10/10/2023 3:20 PM EDT Indication: fall; rib pain and congestion Comparison: 2021  Findings: Lines: None Lungs: Mild left basilar atelectasis. Otherwise clear Pleura: No pleural effusion or pneumothorax. Cardiomediastinum: The cardiomediastinal silhouette is normal Soft Tissues: Unremarkable. Bones: No acute osseous abnormality. Mild pectus deformity.     Impression: Impression: No acute abnormality. No definite evidence of a displaced rib fracture or pneumothorax. Electronically Signed: Jameel Ambrose DO  10/10/2023 3:29 PM EDT  Workstation ID: [id number]     Assessment & Plan      amLODIPine, 10 mg, Oral, Daily  anastrozole, 1 mg, Oral, Daily  cetirizine, 10 mg, Oral, Nightly  chlorthalidone, 25 mg, Oral, Daily  enoxaparin, 40 mg, Subcutaneous, Daily  ferrous sulfate, 324 mg, Oral, Once per day on Mon Wed Fri  insulin lispro, 2-7 Units, Subcutaneous, 4x Daily AC & at Bedtime  levothyroxine, 100 mcg, Oral, Q AM  losartan, 100 mg, Oral, Q24H  mirtazapine, 15 mg, Oral, Nightly  pantoprazole, 40 mg, Oral, QAM AC  rOPINIRole, 0.25 mg, Oral, Nightly  rosuvastatin, 10 mg, Oral, Nightly  senna-docusate sodium, 2 tablet, Oral, BID  sertraline, 150 mg, Oral, Daily  sodium chloride, 10 mL, Intravenous, Q12H             Active Hospital Problems    Diagnosis  POA    **Acute hypoxemic respiratory failure [J96.01]  Yes    Hyperkalemia [E87.5]  Yes    Acute respiratory failure [J96.00]  Yes    History of breast cancer [Z85.3]  Not Applicable    Personal history of CLL (chronic lymphocytic leukemia) [Z85.6]  Not Applicable    History of cigarette smoking [Z87.891]  Not Applicable    Fall at home [W19.XXXA, Y92.009]  Not Applicable    Rhinovirus infection [B34.8]  Yes    CKD (chronic kidney disease) stage 3, GFR 30-59 ml/min [N18.30]  Yes    COPD (chronic obstructive pulmonary disease) [J44.9]  Yes    LIBBY (obstructive sleep apnea) [G47.33]  Yes    Vitamin D deficiency [E55.9]  Yes    Vitamin B 12 deficiency [E53.8]  Yes    Overactive bladder [N32.81]  Yes    Primary osteoarthritis involving multiple joints  [M15.9]  Yes    Mixed hyperlipidemia [E78.2]  Yes    Mixed anxiety and depressive disorder [F41.8]  Yes    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     Plan:   Avoid Dilaudid, oral pain medications as necessary  Continue aggressive pulmonary toilet  Antiemetic medications as necessary--needed Compazine in addition to Zofran this morning before getting relief  Encouraged incentive spirometry, Flexeril at her request.   IV Toradol as needed  Nausea has resolved  We will continue to watch her renal function closely.  K was elevated yesterday, but the specimen was hemolyzed. Still a bit elevated today.   We will continue to watch this and her renal function closely and intervene as necessary.  Follow-up chest x-ray today to ensure no progressive airspace disease, may need to follow up with chest CT to ensure no insidious pneumonia is brewing if CXR is non-diagnostic.  Continue home medications for her chronic medical conditions as clinically appropriate  A1c of 6 back in May, consistent carb diet with insulin and Accu-Cheks as ordered    PT/OT    DVT prophylaxis:  Medical DVT prophylaxis orders are present.    CODE STATUS:    Level Of Support Discussed With: Patient  Code Status (Patient has no pulse and is not breathing): CPR (Attempt to Resuscitate)  Medical Interventions (Patient has pulse or is breathing): Full Support    Disposition:  I expect patient to be discharged within 24 to 48 hours. PT is now recommending SNF, agreeable to Critical access hospital at discharge.     Electronically signed by Vipin Whitlock MD, 10/13/23, 08:09 EDT.  [facility] Hospitalist  Team

## 2023-10-13 NOTE — PLAN OF CARE
Goal Outcome Evaluation:              Outcome Evaluation: Patient had c/o right side back pain, see MAR. Patient currently on 5L NC. Family at bedside. PT recommending SNF due to ambulation unsafety.

## 2023-10-13 NOTE — DISCHARGE PLACEMENT REQUEST
"Diane Apple (83 y.o. Female)       Date of Birth   1940    Social Security Number       Address   [address] [place] [address] [address] [place] [address]    Home Phone       MRN   [id number]       Mu-ism   Non-Latter-day    Marital Status                               Admission Date   10/10/23    Admission Type   Emergency    Admitting Provider   Kalyan Mckeon MD    Attending Provider   Vipin Whitlock MD    Department, Room/Bed   [facility] 3C MEDICAL INPATIENT, 364/1       Discharge Date       Discharge Disposition       Discharge Destination                                 Attending Provider: Vipin Whitlock MD    Allergies: No Known Allergies    Isolation: Droplet   Infection: Rhinovirus  (10/10/23)   Code Status: CPR    Ht: 162.6 cm (64.02\")   Wt: 79 kg (174 lb 2.6 oz)    Admission Cmt: None   Principal Problem: Acute hypoxemic respiratory failure [J96.01]                   Active Insurance as of 10/10/2023       Primary Coverage       Payor Plan Insurance Group Employer/Plan Group    HUMANA MEDICARE REPLACEMENT HUMANA MEDICARE REPLACEMENT [id number]       Payor Plan Address Payor Plan Phone Number Payor Plan Fax Number Effective Dates    [address] [address] [phone]  1/1/2021 - None Entered    [facility] [place] [address]         Subscriber Name Subscriber Birth Date Member ID       DIANE APPLE 1940 [id number]                     Emergency Contacts        (Rel.) Home Phone Work Phone Mobile Phone    CHERYL SLAUGHTER (Daughter) [phone] -- --    ZECHARIAHKOFI EDWARD (Spouse) -- -- [phone]          "

## 2023-10-13 NOTE — CASE MANAGEMENT/SOCIAL WORK
Continued Stay Note   [facility]     Patient Name: Diane Guan  MRN: [id number]  Today's Date: 10/13/2023    Admit Date: 10/10/2023    Plan: Referral to [facility] pending. Will require precert. PASRR approved.   Discharge Plan       Row Name 10/13/23 1552       Plan    Plan Referral to [facility] pending. Will require precert. PASRR approved.    Patient/Family in Agreement with Plan yes    Provided Post Acute Provider List? Yes    Post Acute Provider List Nursing Home    Delivered To Support Person    Method of Delivery In person    Plan Comments CM notified by nursing that PT's recommendations have changed to SNF. CM met with patient and daughter at bedside and provided SNF list. Pt agreeable and referral requested for [facility]. OLIVER Mccabe notified for PASRR- approved 10/13. CM sent referral in [facility] and to [facility], pending review. Will leave handoff for weekend team to follow up.             Megan Naegele, RN     Office Phone: [phone]  Office Cell: [phone]

## 2023-10-14 ENCOUNTER — APPOINTMENT (OUTPATIENT)
Dept: GENERAL RADIOLOGY | Facility: HOSPITAL | Age: 83
DRG: 207 | End: 2023-10-14
Payer: MEDICARE

## 2023-10-14 ENCOUNTER — APPOINTMENT (OUTPATIENT)
Dept: CT IMAGING | Facility: HOSPITAL | Age: 83
DRG: 207 | End: 2023-10-14
Payer: MEDICARE

## 2023-10-14 PROBLEM — E87.5 HYPERKALEMIA: Status: ACTIVE | Noted: 2023-10-14

## 2023-10-14 LAB
ANION GAP SERPL CALCULATED.3IONS-SCNC: 13 MMOL/L (ref 5–15)
ANION GAP SERPL CALCULATED.3IONS-SCNC: 13 MMOL/L (ref 5–15)
ANISOCYTOSIS BLD QL: ABNORMAL
ARTERIAL PATENCY WRIST A: POSITIVE
ARTERIAL PATENCY WRIST A: POSITIVE
ATMOSPHERIC PRESS: ABNORMAL MM[HG]
ATMOSPHERIC PRESS: ABNORMAL MM[HG]
BACTERIA UR QL AUTO: ABNORMAL /HPF
BASE EXCESS BLDA CALC-SCNC: -0.4 MMOL/L (ref 0–3)
BASE EXCESS BLDA CALC-SCNC: -5 MMOL/L (ref 0–3)
BDY SITE: ABNORMAL
BDY SITE: ABNORMAL
BILIRUB UR QL STRIP: NEGATIVE
BUN SERPL-MCNC: 47 MG/DL (ref 8–23)
BUN SERPL-MCNC: 49 MG/DL (ref 8–23)
BUN/CREAT SERPL: 31.6 (ref 7–25)
BUN/CREAT SERPL: 32.2 (ref 7–25)
CA-I BLDA-SCNC: 1.26 MMOL/L (ref 1.15–1.33)
CALCIUM SPEC-SCNC: 9.3 MG/DL (ref 8.6–10.5)
CALCIUM SPEC-SCNC: 9.3 MG/DL (ref 8.6–10.5)
CHLORIDE SERPL-SCNC: 104 MMOL/L (ref 98–107)
CHLORIDE SERPL-SCNC: 105 MMOL/L (ref 98–107)
CHLORIDE UR-SCNC: 52 MMOL/L
CLARITY UR: CLEAR
CO2 BLDA-SCNC: 25.4 MMOL/L (ref 22–29)
CO2 BLDA-SCNC: 29 MMOL/L (ref 22–29)
CO2 SERPL-SCNC: 18 MMOL/L (ref 22–29)
CO2 SERPL-SCNC: 22 MMOL/L (ref 22–29)
COLOR UR: YELLOW
CREAT SERPL-MCNC: 1.46 MG/DL (ref 0.57–1)
CREAT SERPL-MCNC: 1.55 MG/DL (ref 0.57–1)
CREAT UR-MCNC: 109.8 MG/DL
D-LACTATE SERPL-SCNC: 0.4 MMOL/L (ref 0.2–2)
D-LACTATE SERPL-SCNC: 0.8 MMOL/L (ref 0.5–2)
DEPRECATED RDW RBC AUTO: 56.9 FL (ref 37–54)
EGFRCR SERPLBLD CKD-EPI 2021: 33.3 ML/MIN/1.73
EGFRCR SERPLBLD CKD-EPI 2021: 35.8 ML/MIN/1.73
EOSINOPHIL # BLD MANUAL: 0.29 10*3/MM3 (ref 0–0.4)
EOSINOPHIL NFR BLD MANUAL: 1 % (ref 0.3–6.2)
ERYTHROCYTE [DISTWIDTH] IN BLOOD BY AUTOMATED COUNT: 16.7 % (ref 12.3–15.4)
GLUCOSE BLDC GLUCOMTR-MCNC: 128 MG/DL (ref 70–105)
GLUCOSE BLDC GLUCOMTR-MCNC: 132 MG/DL (ref 70–105)
GLUCOSE BLDC GLUCOMTR-MCNC: 147 MG/DL (ref 70–105)
GLUCOSE BLDC GLUCOMTR-MCNC: 166 MG/DL (ref 70–105)
GLUCOSE BLDC GLUCOMTR-MCNC: 170 MG/DL (ref 70–105)
GLUCOSE BLDC GLUCOMTR-MCNC: 190 MG/DL (ref 70–105)
GLUCOSE BLDC GLUCOMTR-MCNC: 191 MG/DL (ref 74–100)
GLUCOSE BLDC GLUCOMTR-MCNC: 191 MG/DL (ref 74–100)
GLUCOSE SERPL-MCNC: 150 MG/DL (ref 65–99)
GLUCOSE SERPL-MCNC: 186 MG/DL (ref 65–99)
GLUCOSE UR STRIP-MCNC: ABNORMAL MG/DL
HBA1C MFR BLD: 6.5 % (ref 4.8–5.6)
HCO3 BLDA-SCNC: 23.6 MMOL/L (ref 21–28)
HCO3 BLDA-SCNC: 27.2 MMOL/L (ref 21–28)
HCT VFR BLD AUTO: 37.6 % (ref 34–46.6)
HCT VFR BLDA CALC: 37 % (ref 38–51)
HEMODILUTION: NO
HEMODILUTION: NO
HGB BLD-MCNC: 11.1 G/DL (ref 12–15.9)
HGB BLDA-MCNC: 12.5 G/DL (ref 12–17)
HGB UR QL STRIP.AUTO: NEGATIVE
HYALINE CASTS UR QL AUTO: ABNORMAL /LPF
INHALED O2 CONCENTRATION: 100 %
INHALED O2 CONCENTRATION: 80 %
KETONES UR QL STRIP: NEGATIVE
LEUKOCYTE ESTERASE UR QL STRIP.AUTO: ABNORMAL
LYMPHOCYTES # BLD MANUAL: 19.87 10*3/MM3 (ref 0.7–3.1)
LYMPHOCYTES NFR BLD MANUAL: 1 % (ref 5–12)
MCH RBC QN AUTO: 27.1 PG (ref 26.6–33)
MCHC RBC AUTO-ENTMCNC: 29.6 G/DL (ref 31.5–35.7)
MCV RBC AUTO: 91.4 FL (ref 79–97)
MODALITY: ABNORMAL
MODALITY: ABNORMAL
MONOCYTES # BLD: 0.29 10*3/MM3 (ref 0.1–0.9)
NEUTROPHILS # BLD AUTO: 8.35 10*3/MM3 (ref 1.7–7)
NEUTROPHILS NFR BLD MANUAL: 29 % (ref 42.7–76)
NITRITE UR QL STRIP: NEGATIVE
PCO2 BLDA: 57.6 MM HG (ref 35–48)
PCO2 BLDA: 58.9 MM HG (ref 35–48)
PEEP RESPIRATORY: 8 CM[H2O]
PH BLDA: 7.21 PH UNITS (ref 7.35–7.45)
PH BLDA: 7.28 PH UNITS (ref 7.35–7.45)
PH UR STRIP.AUTO: <=5 [PH] (ref 5–8)
PLATELET # BLD AUTO: 148 10*3/MM3 (ref 140–450)
PMV BLD AUTO: 10.2 FL (ref 6–12)
PO2 BLDA: 118.7 MM HG (ref 83–108)
PO2 BLDA: 91.2 MM HG (ref 83–108)
POIKILOCYTOSIS BLD QL SMEAR: ABNORMAL
POTASSIUM BLDA-SCNC: 5.7 MMOL/L (ref 3.5–4.5)
POTASSIUM SERPL-SCNC: 5.2 MMOL/L (ref 3.5–5.2)
POTASSIUM SERPL-SCNC: 5.6 MMOL/L (ref 3.5–5.2)
POTASSIUM SERPL-SCNC: 6 MMOL/L (ref 3.5–5.2)
PROCALCITONIN SERPL-MCNC: 0.13 NG/ML (ref 0–0.25)
PROT ?TM UR-MCNC: 16.7 MG/DL
PROT UR QL STRIP: NEGATIVE
PROT/CREAT UR: 152.1 MG/G CREA (ref 0–200)
RBC # BLD AUTO: 4.12 10*6/MM3 (ref 3.77–5.28)
RBC # UR STRIP: ABNORMAL /HPF
REF LAB TEST METHOD: ABNORMAL
RESPIRATORY RATE: 22
SAO2 % BLDCOA: 95.7 % (ref 94–98)
SAO2 % BLDCOA: 97.5 % (ref 94–98)
SCAN SLIDE: NORMAL
SMALL PLATELETS BLD QL SMEAR: ADEQUATE
SODIUM BLD-SCNC: 139 MMOL/L (ref 138–146)
SODIUM SERPL-SCNC: 136 MMOL/L (ref 136–145)
SODIUM SERPL-SCNC: 139 MMOL/L (ref 136–145)
SODIUM UR-SCNC: 53 MMOL/L
SP GR UR STRIP: 1.01 (ref 1–1.03)
SQUAMOUS #/AREA URNS HPF: ABNORMAL /HPF
T4 FREE SERPL-MCNC: 1.06 NG/DL (ref 0.93–1.7)
TSH SERPL DL<=0.05 MIU/L-ACNC: 1.32 UIU/ML (ref 0.27–4.2)
UROBILINOGEN UR QL STRIP: ABNORMAL
VARIANT LYMPHS NFR BLD MANUAL: 69 % (ref 19.6–45.3)
VENTILATOR MODE: ABNORMAL
VT ON VENT VENT: 500 ML
WBC # UR STRIP: ABNORMAL /HPF
WBC MORPH BLD: NORMAL
WBC NRBC COR # BLD: 28.8 10*3/MM3 (ref 3.4–10.8)

## 2023-10-14 PROCEDURE — 94799 UNLISTED PULMONARY SVC/PX: CPT

## 2023-10-14 PROCEDURE — 5A09357 ASSISTANCE WITH RESPIRATORY VENTILATION, LESS THAN 24 CONSECUTIVE HOURS, CONTINUOUS POSITIVE AIRWAY PRESSURE: ICD-10-PCS | Performed by: INTERNAL MEDICINE

## 2023-10-14 PROCEDURE — 82436 ASSAY OF URINE CHLORIDE: CPT | Performed by: INTERNAL MEDICINE

## 2023-10-14 PROCEDURE — 87186 SC STD MICRODIL/AGAR DIL: CPT | Performed by: NURSE PRACTITIONER

## 2023-10-14 PROCEDURE — 81001 URINALYSIS AUTO W/SCOPE: CPT | Performed by: NURSE PRACTITIONER

## 2023-10-14 PROCEDURE — 87086 URINE CULTURE/COLONY COUNT: CPT | Performed by: NURSE PRACTITIONER

## 2023-10-14 PROCEDURE — 05HC33Z INSERTION OF INFUSION DEVICE INTO LEFT BASILIC VEIN, PERCUTANEOUS APPROACH: ICD-10-PCS | Performed by: NURSE PRACTITIONER

## 2023-10-14 PROCEDURE — 74018 RADEX ABDOMEN 1 VIEW: CPT

## 2023-10-14 PROCEDURE — 82948 REAGENT STRIP/BLOOD GLUCOSE: CPT

## 2023-10-14 PROCEDURE — 84145 PROCALCITONIN (PCT): CPT | Performed by: NURSE PRACTITIONER

## 2023-10-14 PROCEDURE — 25010000002 CALCIUM GLUCONATE-NACL 1-0.675 GM/50ML-% SOLUTION: Performed by: INTERNAL MEDICINE

## 2023-10-14 PROCEDURE — 36600 WITHDRAWAL OF ARTERIAL BLOOD: CPT

## 2023-10-14 PROCEDURE — 82570 ASSAY OF URINE CREATININE: CPT | Performed by: INTERNAL MEDICINE

## 2023-10-14 PROCEDURE — 63710000001 INSULIN LISPRO (HUMAN) PER 5 UNITS: Performed by: INTERNAL MEDICINE

## 2023-10-14 PROCEDURE — 84443 ASSAY THYROID STIM HORMONE: CPT | Performed by: NURSE PRACTITIONER

## 2023-10-14 PROCEDURE — 80048 BASIC METABOLIC PNL TOTAL CA: CPT | Performed by: PHYSICIAN ASSISTANT

## 2023-10-14 PROCEDURE — 63710000001 INSULIN REGULAR HUMAN PER 5 UNITS: Performed by: INTERNAL MEDICINE

## 2023-10-14 PROCEDURE — 84132 ASSAY OF SERUM POTASSIUM: CPT | Performed by: INTERNAL MEDICINE

## 2023-10-14 PROCEDURE — 25010000002 LORAZEPAM PER 2 MG: Performed by: INTERNAL MEDICINE

## 2023-10-14 PROCEDURE — 82330 ASSAY OF CALCIUM: CPT

## 2023-10-14 PROCEDURE — 36410 VNPNXR 3YR/> PHY/QHP DX/THER: CPT

## 2023-10-14 PROCEDURE — 84156 ASSAY OF PROTEIN URINE: CPT | Performed by: INTERNAL MEDICINE

## 2023-10-14 PROCEDURE — 83605 ASSAY OF LACTIC ACID: CPT

## 2023-10-14 PROCEDURE — C1751 CATH, INF, PER/CENT/MIDLINE: HCPCS

## 2023-10-14 PROCEDURE — 80051 ELECTROLYTE PANEL: CPT

## 2023-10-14 PROCEDURE — 25010000002 LORAZEPAM PER 2 MG: Performed by: PHYSICIAN ASSISTANT

## 2023-10-14 PROCEDURE — 25010000002 FUROSEMIDE PER 20 MG: Performed by: INTERNAL MEDICINE

## 2023-10-14 PROCEDURE — 94660 CPAP INITIATION&MGMT: CPT

## 2023-10-14 PROCEDURE — 87077 CULTURE AEROBIC IDENTIFY: CPT | Performed by: NURSE PRACTITIONER

## 2023-10-14 PROCEDURE — 84300 ASSAY OF URINE SODIUM: CPT | Performed by: INTERNAL MEDICINE

## 2023-10-14 PROCEDURE — 82803 BLOOD GASES ANY COMBINATION: CPT

## 2023-10-14 PROCEDURE — 94761 N-INVAS EAR/PLS OXIMETRY MLT: CPT

## 2023-10-14 PROCEDURE — 25010000002 MORPHINE PER 10 MG: Performed by: FAMILY MEDICINE

## 2023-10-14 PROCEDURE — 84439 ASSAY OF FREE THYROXINE: CPT | Performed by: NURSE PRACTITIONER

## 2023-10-14 PROCEDURE — 85018 HEMOGLOBIN: CPT

## 2023-10-14 PROCEDURE — 70450 CT HEAD/BRAIN W/O DYE: CPT

## 2023-10-14 PROCEDURE — 25010000002 ENOXAPARIN PER 10 MG: Performed by: INTERNAL MEDICINE

## 2023-10-14 PROCEDURE — 87040 BLOOD CULTURE FOR BACTERIA: CPT | Performed by: NURSE PRACTITIONER

## 2023-10-14 PROCEDURE — 25010000002 HYALURONIDASE (HUMAN) 150 UNIT/ML SOLUTION 1 ML VIAL: Performed by: PHYSICIAN ASSISTANT

## 2023-10-14 RX ORDER — POLYETHYLENE GLYCOL 3350 17 G/17G
17 POWDER, FOR SOLUTION ORAL DAILY PRN
Status: DISCONTINUED | OUTPATIENT
Start: 2023-10-14 | End: 2023-10-14

## 2023-10-14 RX ORDER — SODIUM CHLORIDE 0.9 % (FLUSH) 0.9 %
10 SYRINGE (ML) INJECTION EVERY 12 HOURS SCHEDULED
Status: DISCONTINUED | OUTPATIENT
Start: 2023-10-14 | End: 2023-11-06 | Stop reason: HOSPADM

## 2023-10-14 RX ORDER — LORAZEPAM 2 MG/ML
0.5 INJECTION INTRAMUSCULAR ONCE AS NEEDED
Status: COMPLETED | OUTPATIENT
Start: 2023-10-14 | End: 2023-10-14

## 2023-10-14 RX ORDER — DEXTROSE MONOHYDRATE 25 G/50ML
50 INJECTION, SOLUTION INTRAVENOUS
Status: DISCONTINUED | OUTPATIENT
Start: 2023-10-14 | End: 2023-11-06 | Stop reason: HOSPADM

## 2023-10-14 RX ORDER — BISACODYL 5 MG/1
5 TABLET, DELAYED RELEASE ORAL DAILY PRN
Status: DISCONTINUED | OUTPATIENT
Start: 2023-10-14 | End: 2023-10-15

## 2023-10-14 RX ORDER — SODIUM CHLORIDE 9 MG/ML
40 INJECTION, SOLUTION INTRAVENOUS AS NEEDED
Status: DISCONTINUED | OUTPATIENT
Start: 2023-10-14 | End: 2023-11-06 | Stop reason: HOSPADM

## 2023-10-14 RX ORDER — LORAZEPAM 2 MG/ML
1 INJECTION INTRAMUSCULAR EVERY 4 HOURS PRN
Status: DISCONTINUED | OUTPATIENT
Start: 2023-10-14 | End: 2023-10-17

## 2023-10-14 RX ORDER — AMOXICILLIN 250 MG
1 CAPSULE ORAL 2 TIMES DAILY
Status: DISCONTINUED | OUTPATIENT
Start: 2023-10-14 | End: 2023-10-15

## 2023-10-14 RX ORDER — METOCLOPRAMIDE 5 MG/1
5 TABLET ORAL
Status: DISCONTINUED | OUTPATIENT
Start: 2023-10-14 | End: 2023-10-15

## 2023-10-14 RX ORDER — BISACODYL 10 MG
10 SUPPOSITORY, RECTAL RECTAL DAILY PRN
Status: DISCONTINUED | OUTPATIENT
Start: 2023-10-14 | End: 2023-10-14

## 2023-10-14 RX ORDER — CALCIUM GLUCONATE 20 MG/ML
1000 INJECTION, SOLUTION INTRAVENOUS ONCE
Status: COMPLETED | OUTPATIENT
Start: 2023-10-14 | End: 2023-10-14

## 2023-10-14 RX ORDER — IPRATROPIUM BROMIDE AND ALBUTEROL SULFATE 2.5; .5 MG/3ML; MG/3ML
3 SOLUTION RESPIRATORY (INHALATION) EVERY 6 HOURS PRN
Status: DISCONTINUED | OUTPATIENT
Start: 2023-10-14 | End: 2023-11-06 | Stop reason: HOSPADM

## 2023-10-14 RX ORDER — SODIUM CHLORIDE 0.9 % (FLUSH) 0.9 %
10 SYRINGE (ML) INJECTION AS NEEDED
Status: DISCONTINUED | OUTPATIENT
Start: 2023-10-14 | End: 2023-11-06 | Stop reason: HOSPADM

## 2023-10-14 RX ORDER — BISACODYL 5 MG/1
5 TABLET, DELAYED RELEASE ORAL DAILY PRN
Status: DISCONTINUED | OUTPATIENT
Start: 2023-10-14 | End: 2023-10-14

## 2023-10-14 RX ORDER — BISACODYL 10 MG
10 SUPPOSITORY, RECTAL RECTAL ONCE
Status: COMPLETED | OUTPATIENT
Start: 2023-10-14 | End: 2023-10-14

## 2023-10-14 RX ORDER — POLYETHYLENE GLYCOL 3350 17 G/17G
17 POWDER, FOR SOLUTION ORAL DAILY PRN
Status: DISCONTINUED | OUTPATIENT
Start: 2023-10-14 | End: 2023-10-15

## 2023-10-14 RX ORDER — BUDESONIDE AND FORMOTEROL FUMARATE DIHYDRATE 160; 4.5 UG/1; UG/1
2 AEROSOL RESPIRATORY (INHALATION)
Status: DISCONTINUED | OUTPATIENT
Start: 2023-10-14 | End: 2023-10-15

## 2023-10-14 RX ORDER — BISACODYL 10 MG
10 SUPPOSITORY, RECTAL RECTAL DAILY PRN
Status: DISCONTINUED | OUTPATIENT
Start: 2023-10-14 | End: 2023-10-15

## 2023-10-14 RX ORDER — AMOXICILLIN 250 MG
2 CAPSULE ORAL 2 TIMES DAILY PRN
Status: DISCONTINUED | OUTPATIENT
Start: 2023-10-14 | End: 2023-10-14

## 2023-10-14 RX ORDER — FUROSEMIDE 10 MG/ML
40 INJECTION INTRAMUSCULAR; INTRAVENOUS ONCE
Status: COMPLETED | OUTPATIENT
Start: 2023-10-14 | End: 2023-10-14

## 2023-10-14 RX ORDER — POLYETHYLENE GLYCOL 3350 17 G/17G
17 POWDER, FOR SOLUTION ORAL DAILY
Status: DISCONTINUED | OUTPATIENT
Start: 2023-10-14 | End: 2023-10-15

## 2023-10-14 RX ORDER — HYDRALAZINE HYDROCHLORIDE 20 MG/ML
20 INJECTION INTRAMUSCULAR; INTRAVENOUS EVERY 6 HOURS PRN
Status: DISCONTINUED | OUTPATIENT
Start: 2023-10-14 | End: 2023-10-17

## 2023-10-14 RX ADMIN — DEXTROSE MONOHYDRATE 50 ML: 25 INJECTION, SOLUTION INTRAVENOUS at 12:35

## 2023-10-14 RX ADMIN — Medication 10 ML: at 10:00

## 2023-10-14 RX ADMIN — INSULIN LISPRO 2 UNITS: 100 INJECTION, SOLUTION INTRAVENOUS; SUBCUTANEOUS at 08:00

## 2023-10-14 RX ADMIN — MORPHINE SULFATE 1 MG: 2 INJECTION, SOLUTION INTRAMUSCULAR; INTRAVENOUS at 18:13

## 2023-10-14 RX ADMIN — OXYCODONE HYDROCHLORIDE 5 MG: 5 TABLET ORAL at 01:35

## 2023-10-14 RX ADMIN — MORPHINE SULFATE 1 MG: 2 INJECTION, SOLUTION INTRAMUSCULAR; INTRAVENOUS at 11:05

## 2023-10-14 RX ADMIN — BISACODYL 10 MG: 10 SUPPOSITORY RECTAL at 15:02

## 2023-10-14 RX ADMIN — FUROSEMIDE 40 MG: 10 INJECTION, SOLUTION INTRAMUSCULAR; INTRAVENOUS at 15:02

## 2023-10-14 RX ADMIN — SODIUM BICARBONATE 50 MEQ: 84 INJECTION INTRAVENOUS at 12:35

## 2023-10-14 RX ADMIN — ENOXAPARIN SODIUM 30 MG: 100 INJECTION SUBCUTANEOUS at 15:04

## 2023-10-14 RX ADMIN — CALCIUM GLUCONATE 1000 MG: 20 INJECTION, SOLUTION INTRAVENOUS at 12:35

## 2023-10-14 RX ADMIN — SODIUM BICARBONATE 50 MEQ: 84 INJECTION INTRAVENOUS at 00:51

## 2023-10-14 RX ADMIN — LORAZEPAM 1 MG: 2 INJECTION INTRAMUSCULAR; INTRAVENOUS at 18:13

## 2023-10-14 RX ADMIN — LORAZEPAM 0.5 MG: 2 INJECTION INTRAMUSCULAR; INTRAVENOUS at 00:56

## 2023-10-14 RX ADMIN — HYALURONIDASE (HUMAN RECOMBINANT) 150 UNITS: 150 INJECTION, SOLUTION SUBCUTANEOUS at 00:01

## 2023-10-14 RX ADMIN — INSULIN HUMAN 5 UNITS: 100 INJECTION, SOLUTION PARENTERAL at 12:35

## 2023-10-14 NOTE — PLAN OF CARE
Goal Outcome Evaluation:  Plan of Care Reviewed With: patient, daughter        Progress: no change  Outcome Evaluation: tejeda anchored for acute urinary retention, pt not alert enough to take po meds this shift; pt has not had documented bm since 10/9 supp given; no results; pt taken off avaps and put on airvo; vitals stable at this time. pt is pcu overflow.

## 2023-10-14 NOTE — CONSULTS
Midline Line Insertion Procedure Note    Procedure: Insertion of #18G/10CM PowerGlide    Indications:  Poor Access    Procedure Details:   Sterile technique was used including antiseptics, cap, gloves, gown, hand hygiene, mask, and sheet.    #18G/10CM PowerGlide inserted to the L Basilic vein per hospital protocol by LUCINA mcdaniel.     Non-pulsatile blood return: yes    Ultrasound Guidance: Yes    Lot #: [id number]   Expiration date: 04302024    Complications:  none    Findings:  Catheter inserted to 10 cm, with 0 cm exposed.   Mid upper arm circumference is 36 cm.  Catheter was flushed with 20 cc NS and sterile dressing applied.   Patient tolerated procedure well.    Recommendations:  Verbal and/or written Care/Maintenance instructions provided to patient.   Primary nurse notified that midline is okay to use at this time.     Néstor Mcdaniel RN  10/14/23  16:16 EDT

## 2023-10-14 NOTE — PROGRESS NOTES
[facility] Medicine Services   Daily Progress Note    Patient Name: Diane Guan  : 1940  MRN: [id number]  Primary Care Physician:  Asia Queen, APRN  Date of admission: 10/10/2023    Subjective      Admitted for respiratory failure and right-sided chest wall pain after a fall at home.      Patient came in after a fall with right rib and right-sided back pain.     History of Present Illness: Diane Guan is a 83 y.o. female with past medical history of DM 2, hypertension and breast cancer who presented to [facility] on 10/10/2023 complaining of fall in the bathroom today with hitting her right side of the rib cage and right back with pain.  She says that her right leg has a little problem giving away at times and gave her while she was in the restroom today and she fell hitting her right side of the chest wall and right back on the commode.  She had pretty significant pain and that is what brought her here.  She says that she had some cough going on for about 3 weeks and was given a Z-Erick with which her cough is getting better.  She denies any fever chills shortness of breath nausea vomiting diarrhea constipation abdominal pain hematemesis hematochezia melena hemoptysis dysuria or hematuria.  Patient received a dose of Dilaudid in the ER and then her oxygen saturation dropped and she had to be started on 2 L of oxygen by nasal cannula.  I believe the Dilaudid caused some respiratory depression on top of her difficulty to take a deep breath anyways because of the pain secondary to fall has caused this acute respiratory failure.    10/12/23-Did okay overnight.  Nausea has resolved. Still having significant right-sided pain and debility.  Increased oxygen needs overnight, but weaning back down today. PT is now recommending SNF. K is still a bit elevated (yesterday was hemolyzed).  Discussed the case with the bedside nursing staff. The patient's case was also discussed with  the pharmacy and case management providers at multidisciplinary rounds.  No other acute concerns.  10/13/23-Contacted by patient's RN regarding potassium level being elevated on repeat result.  Potassium has been noted is increasing initially 5.2 on presentation, 5.7 on the morning of 10/12, 10/13 and 6.3 the evening of 10/13.  Evaluated patient and discussed with family present in the room.  She continues to note moderate to severe right-sided pain and does appear somewhat lethargic at the time of my exam.  Family notes concern that patient has been showing signs of increased confusion this evening.  Supplemental oxygen requirements are noted is increasing and she is currently on high flow nasal cannula though she appears to be primarily breathing through her mouth while sleeping at the time of my assessment.  O2 saturation is 91%.  Chest x-ray from late this morning reviewed.  EKG as well as ABG has been ordered along with calcium gluconate, insulin, D50 and Lokelma.  Discussed case with family and RN and we will continue to monitor closely throughout the night  10/14/23 D50 and Lokelma. seen in bed confused, short of breath on bipap, family at bed side, DW RN, DW NP ICU,  K:6  calcium gluconate, insulin,       Review of Systems   Constitutional: Negative for chills, fever and night sweats.   HENT:  Positive for congestion. Negative for hoarse voice and sore throat.    Eyes:  Negative for blurred vision and double vision.   Cardiovascular:  Positive for chest pain. Negative for dyspnea on exertion, leg swelling, near-syncope, orthopnea, palpitations and syncope.   Respiratory:  Positive for cough. Negative for hemoptysis, shortness of breath, sputum production and wheezing.    Endocrine: Negative for cold intolerance and heat intolerance.   Skin:  Negative for itching and rash.   Musculoskeletal:  Positive for back pain and myalgias. Negative for neck pain and stiffness.        Patient had a fall today and last  fall was about a year ago.   Gastrointestinal:  Negative for abdominal pain, constipation, diarrhea, hematemesis, hematochezia, melena, nausea and vomiting.   Genitourinary:  Negative for dysuria, frequency, hematuria and urgency.   Neurological:  Negative for excessive daytime sleepiness, dizziness, focal weakness, headaches, light-headedness, loss of balance and weakness.   Psychiatric/Behavioral:  Negative for altered mental status, depression and hallucinations.      Objective      Vitals:   Temp:  [97.5 °F (36.4 °C)-98.9 °F (37.2 °C)] 98.2 °F (36.8 °C)  Heart Rate:  [] 96  Resp:  [21] 21  BP: (116-156)/(48-74) 145/63  Flow (L/min):  [40] 40    Physical Exam   GEN: WDWN elderly woman, tired appearing, dyspnea on BiPAP  HEENT: NCAT, PERRLA, moist mucous membranes  NECK: Supple, midline trachea  CARD: RRR, no peripheral edema  PULM: Somewhat coarse bilateral breath sounds, diminished at the bases, pain and coughing with deep breathing, otherwise non-distressed--right-sided chest wall pain, no palpable areas of flail chest  ABD: soft, NTND, normoactive bowel sounds throughout  NEURO: Grossly intact, non-focal exam, weakness  PSYCH: confused     Result Review    I have personally reviewed the results from the time of this admission to 10/14/2023 20:48 EDT and agree with these findings:  [x]  Laboratory  [x]  Microbiology  [x]  Radiology  [x]  EKG/Telemetry   [x]  Cardiology/Vascular   []  Pathology  [x]  Old records  []  Other:  Wounds (last 24 hours)         CMP:        Lab 10/14/23  1620 10/14/23  0425 10/13/23  2356 10/13/23  2040 10/13/23  0936 10/12/23  1529 10/11/23  1454 10/10/23  1510   SODIUM  --  139 136  --  138 136 137 142   POTASSIUM 5.2 6.0* 5.6* 6.3* 5.7* 5.7* 4.9 5.2   CHLORIDE  --  104 105  --  105 106 107 110*   CO2  --  22.0 18.0*  --  23.0 18.0* 16.0* 20.0*   ANION GAP  --  13.0 13.0  --  10.0 12.0 14.0 12.0   BUN  --  49* 47*  --  45* 42* 36* 37*   CREATININE  --  1.55* 1.46*  --  1.52*  1.51* 1.18* 1.32*   EGFR  --  33.3* 35.8*  --  34.1* 34.4* 46.2* 40.4*   GLUCOSE  --  186* 150*  --  130* 121* 147* 125*   CALCIUM  --  9.3 9.3  --  9.3 9.0 9.0 9.3   TOTAL PROTEIN  --   --   --   --   --   --   --  7.0   ALBUMIN  --   --   --   --   --   --   --  4.2   GLOBULIN  --   --   --   --   --   --   --  2.8   ALT (SGPT)  --   --   --   --   --   --   --  12   AST (SGOT)  --   --   --   --   --   --   --  16   BILIRUBIN  --   --   --   --   --   --   --  0.3   ALK PHOS  --   --   --   --   --   --   --  125*    CBC:      Lab 10/14/23  0753 10/13/23  2356 10/11/23  1454 10/10/23  1510   WBC  --  28.80* 26.30* 30.60*   HEMOGLOBIN  --  11.1* 10.6* 11.7*   HEMOGLOBIN, POC 12.5  --   --   --    HEMATOCRIT  --  37.6 34.4 38.4   HEMATOCRIT POC 37*  --   --   --    PLATELETS  --  148 158 183   NEUTROS ABS  --  8.35* 7.36* 6.73   EOS ABS  --  0.29 0.26  --    MCV  --  91.4 88.7 89.1        XR Chest 1 View    Result Date: 10/13/2023  XR CHEST 1 VW Date of Exam: 10/13/2023 11:24 AM EDT Indication: rib pain after fall/respiratory status a bit worse Comparison: AP portable chest 10/10/2023 Findings: There is stable asymmetric elevation of the right hemidiaphragm. There is stable mild cardiac enlargement. There is opacity in the left base in the costophrenic angle, which could represent atelectasis or small pleural fluid or combination of these. There is minimal linear subsegmental atelectasis in the infrahilar right lower lobe.. No pneumothorax or displaced rib fracture is identified.     Impression: Left costophrenic angle opacity may represent small pleural effusion, atelectasis, or combination of these. Electronically Signed: Marta Carrillo MD  10/13/2023 11:54 AM EDT  Workstation ID: [id number]     Assessment & Plan      amLODIPine, 10 mg, Oral, Daily  anastrozole, 1 mg, Oral, Daily  budesonide-formoterol, 2 puff, Inhalation, BID - RT  cetirizine, 10 mg, Oral, Nightly  chlorthalidone, 25 mg, Oral, Daily  enoxaparin,  30 mg, Subcutaneous, Daily  ferrous sulfate, 324 mg, Oral, Once per day on Mon Wed Fri  insulin lispro, 2-7 Units, Subcutaneous, 4x Daily AC & at Bedtime  levothyroxine, 100 mcg, Oral, Q AM  metoclopramide, 5 mg, Oral, 4x Daily AC & at Bedtime  mirtazapine, 15 mg, Oral, Nightly  pantoprazole, 40 mg, Oral, QAM AC  polyethylene glycol, 17 g, Oral, Daily  rOPINIRole, 0.25 mg, Oral, Nightly  rosuvastatin, 10 mg, Oral, Nightly  senna-docusate sodium, 1 tablet, Oral, BID  sertraline, 150 mg, Oral, Daily  sodium chloride, 10 mL, Intravenous, Q12H  sodium chloride, 10 mL, Intravenous, Q12H  sodium zirconium cyclosilicate, 10 g, Oral, TID  sodium zirconium cyclosilicate, 10 g, Oral, Once               Active Hospital Problems    Diagnosis  POA    **Acute hypoxemic respiratory failure [J96.01]  Yes    Hyperkalemia [E87.5]  Yes    Acute respiratory failure [J96.00]  Yes    History of breast cancer [Z85.3]  Not Applicable    Personal history of CLL (chronic lymphocytic leukemia) [Z85.6]  Not Applicable    History of cigarette smoking [Z87.891]  Not Applicable    Fall at home [W19.XXXA, Y92.009]  Not Applicable    Rhinovirus infection [B34.8]  Yes    CKD (chronic kidney disease) stage 3, GFR 30-59 ml/min [N18.30]  Yes    COPD (chronic obstructive pulmonary disease) [J44.9]  Yes    LIBBY (obstructive sleep apnea) [G47.33]  Yes    Vitamin D deficiency [E55.9]  Yes    Vitamin B 12 deficiency [E53.8]  Yes    Overactive bladder [N32.81]  Yes    Primary osteoarthritis involving multiple joints [M15.9]  Yes    Mixed hyperlipidemia [E78.2]  Yes    Mixed anxiety and depressive disorder [F41.8]  Yes    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 [I13.10, N18.30]  Yes    Insomnia [G47.00]  Yes    Hiatal hernia with gastroesophageal reflux [K44.9, K21.9]  Yes    Type 2 diabetes mellitus with stage 3b chronic kidney disease, without long-term current use of insulin [E11.22, N18.32]  Yes    Acquired hypothyroidism [E03.9]  Yes       Resolved Hospital Problems   No resolved problems to display.     Acute respiratory failure /COPD  Duoneb/brovana/budesonide  Avoid Dilaudid, oral pain medications as necessary  Continue aggressive pulmonary toilet  Antiemetic medications as necessary--needed Compazine in addition to Zofran this morning before getting relief  Encouraged incentive spirometry,   Flexeril at her request.   Nausea has resolved  Follow-up chest x-ray today to ensure no progressive airspace disease, may need to follow up with chest CT to ensure no insidious pneumonia is brewing if CXR is non-diagnostic.  We will continue to watch her renal function closely.    Acute metabolic encephalopathy  Likely metabolic secondary to hypercapnia and hypoxia  Patient has not returned to their baseline at this time.    Urinalysis with reflex culture pending  TSH/free T4 pending  CT head pending    Hyperkalemia-  K was elevated yesterday, but the specimen was hemolyzed. Still a bit elevated today.   We will continue to watch this and her renal function closely and intervene as necessary.  Nephrology consult     CKD3, LEXUS  Continue IV hydration  Monitor BNP  Monitor renal function  Nephrology consult  Avoid hypotension  Avoid nephrotoxins     DM2 RISS, check A1c, hold long acting insulin    HTN-continue amlodipine, monitor BP closely    HLD continue crestor, check lipid panel, check CK    CRYSTAL/depression-continue remeron    Hypothyroidsm- continue synthroid check TSH    Continue home medications for her chronic medical conditions as clinically appropriate  A1c of 6 back in May, consistent carb diet with insulin and Accu-Cheks as ordered    PT/OT    DVT prophylaxis:  Medical DVT prophylaxis orders are present.    CODE STATUS:    Level Of Support Discussed With: Patient  Code Status (Patient has no pulse and is not breathing): CPR (Attempt to Resuscitate)  Medical Interventions (Patient has pulse or is breathing): Full Support    Disposition:  I expect  patient to be discharged within 24 to 48 hours. PT is now recommending SNF, agreeable to [facility] at discharge.     Electronically signed by Ra Myers MD, 10/14/23, 20:48 EDT.  [facility]

## 2023-10-14 NOTE — SIGNIFICANT NOTE
Contacted by patient's RN regarding potassium level being elevated on repeat result.  Potassium has been noted is increasing initially 5.2 on presentation, 5.7 on the morning of 10/12, 10/13 and 6.3 the evening of 10/13.  Evaluated patient and discussed with family present in the room.  She continues to note moderate to severe right-sided pain and does appear somewhat lethargic at the time of my exam.  Family notes concern that patient has been showing signs of increased confusion this evening.  Supplemental oxygen requirements are noted is increasing and she is currently on high flow nasal cannula though she appears to be primarily breathing through her mouth while sleeping at the time of my assessment.  O2 saturation is 91%.  Chest x-ray from late this morning reviewed.  EKG as well as ABG has been ordered along with calcium gluconate, insulin, D50 and Lokelma.  Discussed case with family and RN and we will continue to monitor closely throughout the night

## 2023-10-14 NOTE — CASE MANAGEMENT/SOCIAL WORK
Continued Stay Note   Hoang     Patient Name: Diane Guan  MRN: [id number]  Today's Date: 10/14/2023    Admit Date: 10/10/2023    Plan: Referral to [facility] pending. Will require precert. PASRR approved.   Discharge Plan       Row Name 10/14/23 1639       Plan    Plan Comments DC barrier: NPPV 40L 80%                      Expected Discharge Date and Time       Expected Discharge Date Expected Discharge Time    Oct 14, 2023               Tessie Salomon RN

## 2023-10-14 NOTE — CONSULTS
Critical Care Consult Note   Diane Guan : 1940 MRN:[id number] LOS:2 ROOM: [facility]     Reason for admission: Acute hypoxemic respiratory failure     Assessment / Plan     Acute respiratory failure with hypoxia and hypercapnia  Multifactorial with AE COPD, volume overload with small left pleural effusion, splinting due to pain with inspiration   Likely due to fluid volume overload and mild/acute exacerbation COPD  BiPAP settings noted and adjusted as needed.  Airvo as tolerated  Close monitoring of ABG as clinically indicated.   Avoid sedating medications  Scheduled Symbicort, as needed DuoNebs  Procalcitonin pending  Echocardiogram 2021, EF 65%  Repeat echocardiogram pending  Continue chlorthalidone per home dose.  Further diuresis per nephrology  Diuresis per nephrology  Encourage I-S and flutter while awake    Diabetes melitis type II  Hold home dose metformin due to LEXUS  Accu-Cheks before meals and at bedtime with SSI for tight glycemic control  Hemoglobin A1c pending    Acute metabolic encephalopathy  Likely metabolic secondary to hypercapnia and hypoxia  Patient has not returned to their baseline at this time.    Urinalysis with reflex culture pending  TSH/free T4 pending  CT head pending    Leukocytosis, chronic secondary to CLL  Afebrile  Urinalysis with reflex culture pending  Blood cultures pending  Monitor off antibiotics for now    Essential hypertension  Continue home dose Norvasc  Titrate meds as needed    Acute kidney injury on CKD stage III, baseline creatinine 1.3  Acute urinary retention  Hyperkalemia  Remains non oliguric.   Likely prerenal. Possible intrinsic component due to ATN from infection, drugs and hypotension.   C/w IV hydration.   Nephrology consulted  Renal ultrasound pending  Monitor Input/Output very closely.   Received hyper-K treatment this morning.  Lasix x1 per nephrology now  Place Bellamy catheter for now to relieve urinary retention and close I/O monitoring  Net IO  Since Admission: 1,160 mL [10/14/23 1241]      Diabetes mellitus type 2  Hold home medications due to LEXUS  SSI for tight glycemic control  Hemoglobin A1c    Constipation with abdominal discomfort  KUB negative for obstructive pattern, moderate stool burden  Schedule bowel regimen  Dulcolax suppository x1 now, add enema if nonproductive  Continue Reglan, EKG in a.m.    Chronic and stable  Hypothyroidism--continue levothyroxine.  TSH/free T4 pending  Hyperlipidemia--continue statin therapy  Breast cancer, s/p right mastectomy.  Anastrozole since 2019--continue  CLL, leukocytosis at baseline  Mycobacterium AVM complex, s/p antibiotic regimen  Falls at home/functional decline    Level Of Support Discussed With: Patient  Code Status (Patient has no pulse and is not breathing): CPR (Attempt to Resuscitate)  Medical Interventions (Patient has pulse or is breathing): Full Support       Nutrition: Diet: Cardiac Diets, Diabetic Diets; Healthy Heart (2-3 Na+); Consistent Carbohydrate; Texture: Regular Texture (IDDSI 7); Fluid Consistency: Thin (IDDSI 0) Patient isn't on Tube Feeding     DVT prophylaxis:  Medical DVT prophylaxis orders are present.     History of Present illness     The following information has been supplemented by review of documentation, collaboration with other providers, and interview with the patient's daughter at bedside as the patient is confused at the time of examination.  The patient presented to the emergency department on 10/10/2023 for evaluation of right chest wall pain following a fall at home.  The patient's daughter reports that the patient lost her balance and fell when she got up in the dark to go to the restroom.  She apparently had preached for the light switch at which time stumbled and fell with her right side hitting the elevated toilet seat and then fell to her right side striking the floor.  There was no report of LOC and following the fall the patient was not confused.  The patient's  "daughter also reports that the patient had complained of a cough productive of clear secretions approximately 3 weeks ago.  On 9/29 the patient was evaluated at an urgent care facility where she was prescribed a Z-Erick however had no improvement.    On evaluation in the emergency department the patient was found to have hypoxic/hypercapnic respiratory failure which responded well to O2 therapy.  Her home dose diuretics were continued.  She was admitted for further evaluation and treatment to the hospitalist.  The patient's daughter states that at approximately 5 PM yesterday evening the patient developed worsening confusion and hallucinations.  Overnight the patient developed worsening O2 needs and hypercapnia requiring increased supplementation to BiPAP therapy.    The patient is a poor historian however denies that she has had recent fever, chills, nausea, vomiting, or chest pain.  She does report that she feels short of air and has mild abdominal pain.  The patient's daughter reports that the patient last urinated at about 5 PM yesterday evening and that her last bowel movement was Monday morning 10/9.  The patient states \"I got to go the bathroom\".    ACP: No ACP documentation on file.  Patient is currently confused however her adult daughter is at bedside and the patient indicates that decisions can be made by her.    Patient was seen and examined on 10/14/23 at 12:19 EDT .    Subjective / Review of systems     Review of Systems     The patient is a poor historian however denies that she has had recent fever, chills, nausea, vomiting, or chest pain.  She does report that she feels short of air and has mild abdominal pain.     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    Depression     Disease of thyroid gland     Diverticulosis of colon 2018    Identified on colonoscopy    Dysphagia 12/2020    Dyspnea on exertion     Fall at home 10/11/2023    Hemorrhoid     Hiatal hernia 12/2020    Hiatal hernia with GERD     large hiatal hernia with high-grade reflux on barium swallow; s/p laparoscopic fundoplication with gastropexy    History of breast cancer 10/11/2023    History of cigarette smoking 10/11/2023    History of diabetes mellitus     no meds now    Hyperlipidemia     Hypertension     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    aspiration pneumonia; completed abx therapy    OAB (overactive bladder)     Personal history of CLL (chronic lymphocytic leukemia) 10/11/2023    Skin cancer     Sleep apnea     daughter states pt does not have and doesn't have machine      Past Surgical History:   Procedure Laterality Date    BLADDER SURGERY      BREAST BIOPSY      BRONCHOSCOPY N/A 09/13/2019    Procedure: BRONCHOSCOPY with bronchial washing;  Surgeon: Marnie Frankel MD;  Location: [facility] END[facility]COPY;  Service: Pulmonary    COLONOSCOPY  07/19/2018    severe diverticulosis    CYST REMOVAL      Removed a fatty cyst off of her back    ENDOSCOPY N/A 11/23/2020    Procedure: ESOPHAGOGASTRODUODENOSCOPY with dilatation (Bougie # 48, 50, 52, 54, 56, 58);  Surgeon: Den Plummer DO;  Location: [facility] ENDOSCOPY;  Service: General;  Laterality: N/A;  Post: large hiatal hernia, joshua's ulcers    HIATAL HERNIA REPAIR N/A 12/30/2020    Procedure: Laparoscopic hiatal hernia repair with gastropexy;  Surgeon: Den Plummer DO;  Location: [facility] MAIN OR;  Service: General;  Laterality: N/A;    MASTECTOMY Right 02/04/2019    Invasive ductal carcinoma    TUBAL ABDOMINAL LIGATION        Social History     Socioeconomic History    Marital status:    Tobacco Use    Smoking status: Former     Packs/day: 0.50     Years: 2.00     Additional pack years: 0.00     Total pack years: 1.00     Types:  Cigarettes     Start date: 1990     Quit date: 1992     Years since quittin.8    Smokeless tobacco: Never   Vaping Use    Vaping Use: Never used   Substance and Sexual Activity    Alcohol use: No    Drug use: No    Sexual activity: Defer      Family History   Problem Relation Age of Onset    Diabetes Mother     Arthritis Other     Heart disease Other       No Known Allergies     Home Medications     Prior to Admission medications    Medication Sig Start Date End Date Taking? Authorizing Provider   amLODIPine (NORVASC) 10 MG tablet Take 1 tablet by mouth Daily. 3/16/23  Yes Asia Queen APRN   anastrozole (ARIMIDEX) 1 MG tablet Take 1 tablet by mouth Daily. 22  Yes Ling Bolden MD   cetirizine (zyrTEC) 10 MG tablet Take 1 tablet by mouth every night at bedtime.   Yes ProviderLala MD   chlorthalidone (HYGROTON) 25 MG tablet TAKE 1 TABLET BY MOUTH EVERY DAY 23  Yes Asia Queen APRN   docusate sodium (COLACE) 100 MG capsule Take 1 capsule by mouth 2 (Two) Times a Day. 3/19/19  Yes ProviderLala MD   ferrous sulfate 324 (65 Fe) MG tablet delayed-release EC tablet Take 1 tablet by mouth 3 (Three) Times a Week.   Yes ProviderLala MD   irbesartan (AVAPRO) 300 MG tablet TAKE 1 TABLET BY MOUTH EVERY DAY 23  Yes Asia Queen APRN   levothyroxine (SYNTHROID, LEVOTHROID) 100 MCG tablet One tab po q day 23  Yes Asia Queen APRN   metFORMIN (GLUCOPHAGE) 500 MG tablet Take 1 tablet by mouth Daily With Breakfast. 23  Yes Asia Queen APRN   metoclopramide (REGLAN) 5 MG tablet Take 1 tablet by mouth 3 (Three) Times a Day As Needed (for nausea and vomiting). 23  Yes Asia Queen APRN   mirtazapine (REMERON) 15 MG tablet Take 1 tablet by mouth every night at bedtime. 3/16/23  Yes Asia Queen APRN   pantoprazole (PROTONIX) 40 MG EC tablet TAKE 1 TABLET BY MOUTH EVERY MORNING BEFORE BREAKFAST. TAKE ON AN EMPTY STOMACH! 23   Yes Asia Queen, SIOMARA   rOPINIRole (REQUIP) 0.25 MG tablet Take 1 tablet by mouth Every Night. Take 1 hour before bedtime. 5/5/23  Yes Asia Queen APRN   rosuvastatin (CRESTOR) 10 MG tablet Take 1 tablet by mouth Every Night. 5/5/23  Yes Asia Queen APRN   sertraline (ZOLOFT) 100 MG tablet Take 1.5 tablets by mouth Daily. 5/5/23  Yes Asia Queen APRBAR   Accu-Chek Softclix Lancets lancets 1 each by Other route Daily. Use as instructed 7/8/21   Martinez Hitchcock, DO   Alcohol Swabs ([facility] Alcohol Prep Pads) 70 % pads APPLY 1 EACH TOPICALLY DAILY. 7/8/21   Provider, MD Lala   glucose blood (Accu-Chek Guide) test strip 1 each by Other route Daily. Use as instructed 7/8/21   Martinez Hitchcock, DO   Hearing Aid Batteries misc 1 Units Every 7 (Seven) Days. 10/25/21   Jemima Fontaine MD   Isopropyl Alcohol (Alcohol Wipes) 70 % misc Apply 1 each topically Daily. 7/8/21   Martinez Hitchcock, DO        Objective / Physical Exam     Vital signs:  Temp: 97.5 °F (36.4 °C)  BP: 137/58  Heart Rate: 89  Resp: 21  SpO2: 99 %  Weight: 80.7 kg (177 lb 14.6 oz)    Admission Weight: Weight: 78.5 kg (173 lb)    Physical Exam  Vitals and nursing note reviewed.   Constitutional:       General: She is not in acute distress.     Appearance: Normal appearance. She is not diaphoretic.   HENT:      Head: Normocephalic and atraumatic.      Right Ear: External ear normal.      Left Ear: External ear normal.      Nose: Nose normal.      Mouth/Throat:      Mouth: Mucous membranes are moist.      Pharynx: Oropharynx is clear.   Eyes:      General: No scleral icterus.     Conjunctiva/sclera: Conjunctivae normal.      Pupils: Pupils are equal, round, and reactive to light.   Neck:      Comments: No JVD  Trachea midline  Cardiovascular:      Heart sounds: Normal heart sounds, S1 normal and S2 normal. No murmur heard.  Pulmonary:      Effort: Pulmonary effort is normal. No respiratory distress.       Breath sounds: Wheezing and rhonchi present.   Abdominal:      General: Bowel sounds are normal. There is no distension.      Palpations: Abdomen is soft.      Tenderness: There is abdominal tenderness.      Comments: Slightly firm but not distended   Musculoskeletal:      Cervical back: Neck supple.      Right lower leg: Edema (Trace pretibial) present.      Left lower leg: Edema (Trace pretibial) present.   Skin:     General: Skin is warm and dry.   Neurological:      General: No focal deficit present.      Mental Status: She is alert. She is disoriented.          Labs     Results from last 7 days   Lab Units 10/14/23  0753 10/13/23  2356 10/11/23  1454 10/10/23  1510   WBC 10*3/mm3  --  28.80* 26.30* 30.60*   HEMATOCRIT %  --  37.6 34.4 38.4   HEMATOCRIT POC % 37*  --   --   --    PLATELETS 10*3/mm3  --  148 158 183      Results from last 7 days   Lab Units 10/14/23  0425 10/13/23  2356 10/13/23  2040 10/13/23  0936 10/12/23  1529 10/11/23  1454   SODIUM mmol/L 139 136  --  138 136 137   POTASSIUM mmol/L 6.0* 5.6* 6.3* 5.7* 5.7* 4.9   CHLORIDE mmol/L 104 105  --  105 106 107   CO2 mmol/L 22.0 18.0*  --  23.0 18.0* 16.0*   BUN mg/dL 49* 47*  --  45* 42* 36*   CREATININE mg/dL 1.55* 1.46*  --  1.52* 1.51* 1.18*        Imaging     XR Abdomen KUB    Result Date: 10/14/2023  Impression: Nonobstructive bowel gas pattern. No acute process. Electronically Signed: Lucy Townsend MD  10/14/2023 6:47 AM EDT  Workstation ID: [id number]    XR Chest 1 View    Result Date: 10/13/2023  Left costophrenic angle opacity may represent small pleural effusion, atelectasis, or combination of these. Electronically Signed: Marta Carrillo MD  10/13/2023 11:54 AM EDT  Workstation ID: [id number]      Chest X ray: My independent assessment showed no overt infiltrates, small left pleural effusions    EKG: My independent evaluation showed sinus rhythm, no ST -T changes    Current Medications     Scheduled Meds:  amLODIPine, 10 mg, Oral,  Daily  anastrozole, 1 mg, Oral, Daily  calcium gluconate, 1,000 mg, Intravenous, Once  cetirizine, 10 mg, Oral, Nightly  chlorthalidone, 25 mg, Oral, Daily  enoxaparin, 30 mg, Subcutaneous, Daily  ferrous sulfate, 324 mg, Oral, Once per day on Mon Wed Fri  insulin lispro, 2-7 Units, Subcutaneous, 4x Daily AC & at Bedtime  insulin regular, 5 Units, Intravenous, Once  levothyroxine, 100 mcg, Oral, Q AM  mirtazapine, 15 mg, Oral, Nightly  pantoprazole, 40 mg, Oral, QAM AC  rOPINIRole, 0.25 mg, Oral, Nightly  rosuvastatin, 10 mg, Oral, Nightly  senna-docusate sodium, 2 tablet, Oral, BID  sertraline, 150 mg, Oral, Daily  sodium bicarbonate, 50 mEq, Intravenous, Once  sodium chloride, 10 mL, Intravenous, Q12H  sodium zirconium cyclosilicate, 10 g, Oral, TID  sodium zirconium cyclosilicate, 10 g, Oral, Once         Continuous Infusions:  dextrose 5 % and sodium chloride 0.9 %, 75 mL/hr, Last Rate: 75 mL/hr (10/13/23 1248)         SIOMARA Castanon   Critical Care  10/14/23   12:19 EDT

## 2023-10-14 NOTE — CONSULTS
RENAL/KCC:    Referring Provider: Dr. Myers  Reason for Consultation: LEXUS/CKD, Hyperkalemia    Subjective     Chief complaint: Fall    History of present illness:  Patient is an 82 yo WF with h/o CKD who does not follow with a Nephrologist.  Her Cr has run around 1.3 in the past.  She has additional h/o DM, HTN and Breast cancer.  She presented on 10/10 after a fall.  She has developed respiratory distress and is seen in the ICU, on BiPAP.  Nursing reports no UOP today.  Cr was 1.3 on admission and is currently 1.5.  K has been elevated (was 6 on AM labs and down to 5.7 after treatment).  Discussed with nurse and with family.    History  Past Medical History:   Diagnosis Date    Acute bronchitis due to human metapneumovirus 02/08/2020    Anxiety disorder     Arthritis     Breast cancer 02/2019    Invasive ductal carcinoma    Chronic lymphocytic leukemia (CLL), B-cell 01/2019    Depression     Disease of thyroid gland     Diverticulosis of colon 2018    Identified on colonoscopy    Dysphagia 12/2020    Dyspnea on exertion     Fall at home 10/11/2023    Hemorrhoid     Hiatal hernia 12/2020    Hiatal hernia with GERD     large hiatal hernia with high-grade reflux on barium swallow; s/p laparoscopic fundoplication with gastropexy    History of breast cancer 10/11/2023    History of cigarette smoking 10/11/2023    History of diabetes mellitus     no meds now    Hyperlipidemia     Hypertension     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    aspiration pneumonia; completed abx therapy    OAB (overactive bladder)     Personal history of CLL (chronic lymphocytic leukemia) 10/11/2023    Skin cancer     Sleep apnea     daughter states pt does not have and doesn't have machine   ,   Past Surgical History:   Procedure Laterality Date    BLADDER SURGERY      BREAST BIOPSY      BRONCHOSCOPY N/A 09/13/2019    Procedure: BRONCHOSCOPY with bronchial washing;  Surgeon: Marnie Frankel MD;  Location: [facility] ENDOSCOPY;  Service: Pulmonary     COLONOSCOPY  2018    severe diverticulosis    CYST REMOVAL      Removed a fatty cyst off of her back    ENDOSCOPY N/A 2020    Procedure: ESOPHAGOGASTRODUODENOSCOPY with dilatation (Bougie # 48, 50, 52, 54, 56, 58);  Surgeon: Den Plummer DO;  Location: [facility] ENDOSCOPY;  Service: General;  Laterality: N/A;  Post: large hiatal hernia, joshua's ulcers    HIATAL HERNIA REPAIR N/A 2020    Procedure: Laparoscopic hiatal hernia repair with gastropexy;  Surgeon: Den Plummer DO;  Location: [facility] MAIN OR;  Service: General;  Laterality: N/A;    MASTECTOMY Right 2019    Invasive ductal carcinoma    TUBAL ABDOMINAL LIGATION     ,   Family History   Problem Relation Age of Onset    Diabetes Mother     Arthritis Other     Heart disease Other    ,   Social History     Socioeconomic History    Marital status:    Tobacco Use    Smoking status: Former     Packs/day: 0.50     Years: 2.00     Additional pack years: 0.00     Total pack years: 1.00     Types: Cigarettes     Start date: 1990     Quit date: 1992     Years since quittin.8    Smokeless tobacco: Never   Vaping Use    Vaping Use: Never used   Substance and Sexual Activity    Alcohol use: No    Drug use: No    Sexual activity: Defer     E-cigarette/Vaping    E-cigarette/Vaping Use Never User      E-cigarette/Vaping Substances     E-cigarette/Vaping Devices         ,   Medications Prior to Admission   Medication Sig Dispense Refill Last Dose    amLODIPine (NORVASC) 10 MG tablet Take 1 tablet by mouth Daily. 90 tablet 1     anastrozole (ARIMIDEX) 1 MG tablet Take 1 tablet by mouth Daily. 90 tablet 3     cetirizine (zyrTEC) 10 MG tablet Take 1 tablet by mouth every night at bedtime.       chlorthalidone (HYGROTON) 25 MG tablet TAKE 1 TABLET BY MOUTH EVERY DAY 90 tablet 1     docusate sodium (COLACE) 100 MG capsule Take 1 capsule by mouth 2 (Two) Times a Day.  3     ferrous sulfate 324 (65 Fe) MG tablet delayed-release EC  tablet Take 1 tablet by mouth 3 (Three) Times a Week.       irbesartan (AVAPRO) 300 MG tablet TAKE 1 TABLET BY MOUTH EVERY DAY 90 tablet 1     levothyroxine (SYNTHROID, LEVOTHROID) 100 MCG tablet One tab po q day 90 tablet 3     metFORMIN (GLUCOPHAGE) 500 MG tablet Take 1 tablet by mouth Daily With Breakfast. 90 tablet 3     metoclopramide (REGLAN) 5 MG tablet Take 1 tablet by mouth 3 (Three) Times a Day As Needed (for nausea and vomiting). 270 tablet 3     mirtazapine (REMERON) 15 MG tablet Take 1 tablet by mouth every night at bedtime. 90 tablet 1     pantoprazole (PROTONIX) 40 MG EC tablet TAKE 1 TABLET BY MOUTH EVERY MORNING BEFORE BREAKFAST. TAKE ON AN EMPTY STOMACH! 90 tablet 1     rOPINIRole (REQUIP) 0.25 MG tablet Take 1 tablet by mouth Every Night. Take 1 hour before bedtime. 90 tablet 3     rosuvastatin (CRESTOR) 10 MG tablet Take 1 tablet by mouth Every Night. 90 tablet 3     sertraline (ZOLOFT) 100 MG tablet Take 1.5 tablets by mouth Daily. 45 tablet 11     Accu-Chek Softclix Lancets lancets 1 each by Other route Daily. Use as instructed 100 each 0     Alcohol Swabs ([employer] Alcohol Prep Pads) 70 % pads APPLY 1 EACH TOPICALLY DAILY.       glucose blood (Accu-Chek Guide) test strip 1 each by Other route Daily. Use as instructed 100 each 0     Hearing Aid Batteries misc 1 Units Every 7 (Seven) Days. 52 each 0     Isopropyl Alcohol (Alcohol Wipes) 70 % misc Apply 1 each topically Daily. 100 each 0    , Scheduled Meds:  amLODIPine, 10 mg, Oral, Daily  anastrozole, 1 mg, Oral, Daily  cetirizine, 10 mg, Oral, Nightly  chlorthalidone, 25 mg, Oral, Daily  enoxaparin, 30 mg, Subcutaneous, Daily  ferrous sulfate, 324 mg, Oral, Once per day on Mon Wed Fri  furosemide, 40 mg, Intravenous, Once  insulin lispro, 2-7 Units, Subcutaneous, 4x Daily AC & at Bedtime  levothyroxine, 100 mcg, Oral, Q AM  mirtazapine, 15 mg, Oral, Nightly  pantoprazole, 40 mg, Oral, QAM AC  rOPINIRole, 0.25 mg, Oral, Nightly  rosuvastatin, 10 mg,  "Oral, Nightly  sertraline, 150 mg, Oral, Daily  sodium chloride, 10 mL, Intravenous, Q12H  sodium zirconium cyclosilicate, 10 g, Oral, TID  sodium zirconium cyclosilicate, 10 g, Oral, Once   , Continuous Infusions:   , PRN Meds:    acetaminophen    senna-docusate sodium **AND** polyethylene glycol **AND** bisacodyl **AND** bisacodyl    cyclobenzaprine    dextrose    dextrose    dextrose    glucagon (human recombinant)    hydrALAZINE    ipratropium-albuterol    LORazepam    Morphine    nitroglycerin    ondansetron    oxyCODONE    [COMPLETED] Insert Peripheral IV **AND** sodium chloride    sodium chloride    sodium chloride, and Allergies:  Patient has no known allergies.    Review of Systems  Pertinent items are noted in HPI    Objective     Vital Signs  Temp:  [97.5 °F (36.4 °C)-97.9 °F (36.6 °C)] 97.5 °F (36.4 °C)  Heart Rate:  [] 89  Resp:  [17-22] 21  BP: (111-155)/(48-70) 137/58    No intake/output data recorded.  I/O last 3 completed shifts:  In: 540 [P.O.:540]  Out: -     Physical Exam:  GEN: Awake, BiPAP on  ENT: PERRL, EOMI, MMM  NECK: Supple, no JVD  CHEST: CTAB, no W/R/C  CV: RRR, no M/G/R  ABD: Soft, NT, +BS  SKIN: Warm and Dry  NEURO: CN's intact      Results Review:   I reviewed the patient's new clinical results.    WBC WBC   Date Value Ref Range Status   10/13/2023 28.80 (H) 3.40 - 10.80 10*3/mm3 Final   10/11/2023 26.30 (H) 3.40 - 10.80 10*3/mm3 Final      HGB Hemoglobin   Date Value Ref Range Status   10/14/2023 12.5 12.0 - 17.0 g/dL Final   10/13/2023 11.1 (L) 12.0 - 15.9 g/dL Final   10/11/2023 10.6 (L) 12.0 - 15.9 g/dL Final      HCT Hematocrit   Date Value Ref Range Status   10/14/2023 37 (L) 38 - 51 % Final   10/13/2023 37.6 34.0 - 46.6 % Final   10/11/2023 34.4 34.0 - 46.6 % Final      Platlets No results found for: \"LABPLAT\"   MCV MCV   Date Value Ref Range Status   10/13/2023 91.4 79.0 - 97.0 fL Final   10/11/2023 88.7 79.0 - 97.0 fL Final          Sodium Sodium   Date Value Ref Range " Status   10/14/2023 139 136 - 145 mmol/L Final   10/13/2023 136 136 - 145 mmol/L Final   10/13/2023 138 136 - 145 mmol/L Final   10/12/2023 136 136 - 145 mmol/L Final   10/11/2023 137 136 - 145 mmol/L Final      Potassium Potassium   Date Value Ref Range Status   10/14/2023 6.0 (H) 3.5 - 5.2 mmol/L Final   10/13/2023 5.6 (H) 3.5 - 5.2 mmol/L Final     Comment:     Slight hemolysis detected by analyzer. Results may be affected.   10/13/2023 6.3 (C) 3.5 - 5.2 mmol/L Final     Comment:     Slight hemolysis detected by analyzer. Results may be affected.   10/13/2023 5.7 (H) 3.5 - 5.2 mmol/L Final   10/12/2023 5.7 (H) 3.5 - 5.2 mmol/L Final     Comment:     Slight hemolysis detected by analyzer. Results may be affected.   10/11/2023 4.9 3.5 - 5.2 mmol/L Final     Comment:     Slight hemolysis detected by analyzer. Results may be affected.      Chloride Chloride   Date Value Ref Range Status   10/14/2023 104 98 - 107 mmol/L Final   10/13/2023 105 98 - 107 mmol/L Final   10/13/2023 105 98 - 107 mmol/L Final   10/12/2023 106 98 - 107 mmol/L Final   10/11/2023 107 98 - 107 mmol/L Final      CO2 CO2   Date Value Ref Range Status   10/14/2023 22.0 22.0 - 29.0 mmol/L Final   10/13/2023 18.0 (L) 22.0 - 29.0 mmol/L Final   10/13/2023 23.0 22.0 - 29.0 mmol/L Final   10/12/2023 18.0 (L) 22.0 - 29.0 mmol/L Final   10/11/2023 16.0 (L) 22.0 - 29.0 mmol/L Final      BUN BUN   Date Value Ref Range Status   10/14/2023 49 (H) 8 - 23 mg/dL Final   10/13/2023 47 (H) 8 - 23 mg/dL Final   10/13/2023 45 (H) 8 - 23 mg/dL Final   10/12/2023 42 (H) 8 - 23 mg/dL Final   10/11/2023 36 (H) 8 - 23 mg/dL Final      Creatinine Creatinine   Date Value Ref Range Status   10/14/2023 1.55 (H) 0.57 - 1.00 mg/dL Final   10/13/2023 1.46 (H) 0.57 - 1.00 mg/dL Final   10/13/2023 1.52 (H) 0.57 - 1.00 mg/dL Final   10/12/2023 1.51 (H) 0.57 - 1.00 mg/dL Final   10/11/2023 1.18 (H) 0.57 - 1.00 mg/dL Final      Calcium Calcium   Date Value Ref Range Status  "  10/14/2023 9.3 8.6 - 10.5 mg/dL Final   10/13/2023 9.3 8.6 - 10.5 mg/dL Final   10/13/2023 9.3 8.6 - 10.5 mg/dL Final   10/12/2023 9.0 8.6 - 10.5 mg/dL Final   10/11/2023 9.0 8.6 - 10.5 mg/dL Final      PO4 No results found for: \"CAPO4\"   Albumin No results found for: \"ALBUMIN\"   Magnesium No results found for: \"MG\"   Uric Acid No results found for: \"URICACID\"         amLODIPine, 10 mg, Oral, Daily  anastrozole, 1 mg, Oral, Daily  cetirizine, 10 mg, Oral, Nightly  chlorthalidone, 25 mg, Oral, Daily  enoxaparin, 30 mg, Subcutaneous, Daily  ferrous sulfate, 324 mg, Oral, Once per day on Mon Wed Fri  furosemide, 40 mg, Intravenous, Once  insulin lispro, 2-7 Units, Subcutaneous, 4x Daily AC & at Bedtime  levothyroxine, 100 mcg, Oral, Q AM  mirtazapine, 15 mg, Oral, Nightly  pantoprazole, 40 mg, Oral, QAM AC  rOPINIRole, 0.25 mg, Oral, Nightly  rosuvastatin, 10 mg, Oral, Nightly  sertraline, 150 mg, Oral, Daily  sodium chloride, 10 mL, Intravenous, Q12H  sodium zirconium cyclosilicate, 10 g, Oral, TID  sodium zirconium cyclosilicate, 10 g, Oral, Once      dextrose 5 % and sodium chloride 0.9 %, 75 mL/hr, Last Rate: 75 mL/hr (10/13/23 1708)        Assessment & Plan       LEXUS/CKD3    Acute hypoxemic respiratory failure    Mixed anxiety and depressive disorder    Primary osteoarthritis involving multiple joints    Hiatal hernia with gastroesophageal reflux    Mixed hyperlipidemia    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    Acquired hypothyroidism    Type 2 diabetes mellitus with stage 3b chronic kidney disease, without long-term current use of insulin    Insomnia    Overactive bladder    Vitamin B 12 deficiency    Vitamin D deficiency    LIBBY (obstructive sleep apnea)    COPD (chronic obstructive pulmonary disease)    CKD (chronic kidney disease) stage 3, GFR 30-59 ml/min    History of breast cancer    Personal history of CLL (chronic lymphocytic leukemia)    History of cigarette smoking    Fall at home    " Rhinovirus infection    Acute respiratory failure    Hyperkalemia      PLAN: Cr is at 1.5 which is just above baseline of 1.3.  K remaining elevated (exacerbated by respiratory acidosis, hyperglycemia and recent Toradol).  Treated this AM and will repeat level.  Will give IV Lasix x 1.  Discussed possibility of dialysis if hyperK remains refractory.  D/C Dextrose containing IVF.  Can monitor off IVF for now.  CKD w/u with urine studies and renal U/S.  Avoid nephrotoxins including NSAID's.  Will follow closely.      Frederick Bonds MD  [facility]  10/14/23  @NOW

## 2023-10-15 ENCOUNTER — APPOINTMENT (OUTPATIENT)
Dept: GENERAL RADIOLOGY | Facility: HOSPITAL | Age: 83
DRG: 207 | End: 2023-10-15
Payer: MEDICARE

## 2023-10-15 ENCOUNTER — APPOINTMENT (OUTPATIENT)
Dept: CARDIOLOGY | Facility: HOSPITAL | Age: 83
DRG: 207 | End: 2023-10-15
Payer: MEDICARE

## 2023-10-15 PROBLEM — J96.90 RESPIRATORY FAILURE: Status: ACTIVE | Noted: 2023-10-15

## 2023-10-15 LAB
ANION GAP SERPL CALCULATED.3IONS-SCNC: 10 MMOL/L (ref 5–15)
ANISOCYTOSIS BLD QL: ABNORMAL
ARTERIAL PATENCY WRIST A: POSITIVE
ARTERIAL PATENCY WRIST A: POSITIVE
ATMOSPHERIC PRESS: ABNORMAL MM[HG]
ATMOSPHERIC PRESS: ABNORMAL MM[HG]
BASE EXCESS BLDA CALC-SCNC: -1 MMOL/L (ref 0–3)
BASE EXCESS BLDA CALC-SCNC: -1.3 MMOL/L (ref 0–3)
BASOPHILS # BLD MANUAL: 0.16 10*3/MM3 (ref 0–0.2)
BASOPHILS NFR BLD MANUAL: 1 % (ref 0–1.5)
BDY SITE: ABNORMAL
BDY SITE: ABNORMAL
BH CV ECHO MEAS - ACS: 1.57 CM
BH CV ECHO MEAS - AI P1/2T: 368.7 MSEC
BH CV ECHO MEAS - AO MAX PG: 18 MMHG
BH CV ECHO MEAS - AO ROOT DIAM: 3.1 CM
BH CV ECHO MEAS - AO V2 MAX: 212.4 CM/SEC
BH CV ECHO MEAS - EDV(CUBED): 75.7 ML
BH CV ECHO MEAS - EDV(MOD-SP4): 58.4 ML
BH CV ECHO MEAS - EF(MOD-BP): 66 %
BH CV ECHO MEAS - EF(MOD-SP4): 66.4 %
BH CV ECHO MEAS - ESV(CUBED): 20.2 ML
BH CV ECHO MEAS - ESV(MOD-SP4): 19.6 ML
BH CV ECHO MEAS - FS: 35.7 %
BH CV ECHO MEAS - IVS/LVPW: 0.74 CM
BH CV ECHO MEAS - IVSD: 0.87 CM
BH CV ECHO MEAS - LA DIMENSION: 2.8 CM
BH CV ECHO MEAS - LV DIASTOLIC VOL/BSA (35-75): 31.5 CM2
BH CV ECHO MEAS - LV MASS(C)D: 142.5 GRAMS
BH CV ECHO MEAS - LV MAX PG: 4.8 MMHG
BH CV ECHO MEAS - LV SYSTOLIC VOL/BSA (12-30): 10.6 CM2
BH CV ECHO MEAS - LV V1 MAX: 109.6 CM/SEC
BH CV ECHO MEAS - LVIDD: 4.2 CM
BH CV ECHO MEAS - LVIDS: 2.7 CM
BH CV ECHO MEAS - LVOT AREA: 2.8 CM2
BH CV ECHO MEAS - LVOT DIAM: 1.9 CM
BH CV ECHO MEAS - LVPWD: 1.17 CM
BH CV ECHO MEAS - MV A MAX VEL: 123 CM/SEC
BH CV ECHO MEAS - MV DEC SLOPE: 466.8 CM/SEC2
BH CV ECHO MEAS - MV DEC TIME: 0.2 SEC
BH CV ECHO MEAS - MV E MAX VEL: 92.7 CM/SEC
BH CV ECHO MEAS - MV E/A: 0.75
BH CV ECHO MEAS - MV MAX PG: 4.9 MMHG
BH CV ECHO MEAS - MV MEAN PG: 2.6 MMHG
BH CV ECHO MEAS - MV V2 VTI: 21.8 CM
BH CV ECHO MEAS - PA ACC TIME: 0.12 SEC
BH CV ECHO MEAS - PA V2 MAX: 107.2 CM/SEC
BH CV ECHO MEAS - PULM A REVS DUR: 0.1 SEC
BH CV ECHO MEAS - PULM A REVS VEL: 29.6 CM/SEC
BH CV ECHO MEAS - PULM DIAS VEL: 34.8 CM/SEC
BH CV ECHO MEAS - PULM S/D: 1.65
BH CV ECHO MEAS - PULM SYS VEL: 57.5 CM/SEC
BH CV ECHO MEAS - RAP SYSTOLE: 3 MMHG
BH CV ECHO MEAS - RV MAX PG: 3.2 MMHG
BH CV ECHO MEAS - RV V1 MAX: 89.1 CM/SEC
BH CV ECHO MEAS - RV V1 VTI: 15.6 CM
BH CV ECHO MEAS - RVDD: 3.4 CM
BH CV ECHO MEAS - RVSP: 33.8 MMHG
BH CV ECHO MEAS - SI(MOD-SP4): 20.9 ML/M2
BH CV ECHO MEAS - SV(MOD-SP4): 38.8 ML
BH CV ECHO MEAS - TR MAX PG: 30.8 MMHG
BH CV ECHO MEAS - TR MAX VEL: 277.4 CM/SEC
BUN SERPL-MCNC: 52 MG/DL (ref 8–23)
BUN/CREAT SERPL: 36.4 (ref 7–25)
CALCIUM SPEC-SCNC: 9.3 MG/DL (ref 8.6–10.5)
CHLORIDE SERPL-SCNC: 107 MMOL/L (ref 98–107)
CO2 BLDA-SCNC: 25.9 MMOL/L (ref 22–29)
CO2 BLDA-SCNC: 28.5 MMOL/L (ref 22–29)
CO2 SERPL-SCNC: 25 MMOL/L (ref 22–29)
CREAT SERPL-MCNC: 1.43 MG/DL (ref 0.57–1)
DEPRECATED RDW RBC AUTO: 51.6 FL (ref 37–54)
EGFRCR SERPLBLD CKD-EPI 2021: 36.7 ML/MIN/1.73
ELLIPTOCYTES BLD QL SMEAR: ABNORMAL
EOSINOPHIL # BLD MANUAL: 0.32 10*3/MM3 (ref 0–0.4)
EOSINOPHIL NFR BLD MANUAL: 2 % (ref 0.3–6.2)
ERYTHROCYTE [DISTWIDTH] IN BLOOD BY AUTOMATED COUNT: 16.1 % (ref 12.3–15.4)
GLUCOSE BLDC GLUCOMTR-MCNC: 120 MG/DL (ref 70–105)
GLUCOSE BLDC GLUCOMTR-MCNC: 138 MG/DL (ref 70–105)
GLUCOSE BLDC GLUCOMTR-MCNC: 163 MG/DL (ref 70–105)
GLUCOSE BLDC GLUCOMTR-MCNC: 173 MG/DL (ref 70–105)
GLUCOSE SERPL-MCNC: 136 MG/DL (ref 65–99)
HCO3 BLDA-SCNC: 24.5 MMOL/L (ref 21–28)
HCO3 BLDA-SCNC: 26.7 MMOL/L (ref 21–28)
HCT VFR BLD AUTO: 32.8 % (ref 34–46.6)
HEMODILUTION: NO
HEMODILUTION: YES
HGB BLD-MCNC: 9.9 G/DL (ref 12–15.9)
INHALED O2 CONCENTRATION: 80 %
INHALED O2 CONCENTRATION: 84 %
LYMPHOCYTES # BLD MANUAL: 10.79 10*3/MM3 (ref 0.7–3.1)
LYMPHOCYTES NFR BLD MANUAL: 6 % (ref 5–12)
MAGNESIUM SERPL-MCNC: 1.9 MG/DL (ref 1.6–2.4)
MCH RBC QN AUTO: 27.1 PG (ref 26.6–33)
MCHC RBC AUTO-ENTMCNC: 30.1 G/DL (ref 31.5–35.7)
MCV RBC AUTO: 90.1 FL (ref 79–97)
MODALITY: ABNORMAL
MODALITY: ABNORMAL
MONOCYTES # BLD: 0.97 10*3/MM3 (ref 0.1–0.9)
NEUTROPHILS # BLD AUTO: 3.86 10*3/MM3 (ref 1.7–7)
NEUTROPHILS NFR BLD MANUAL: 23 % (ref 42.7–76)
NEUTS BAND NFR BLD MANUAL: 1 % (ref 0–5)
PCO2 BLDA: 44.5 MM HG (ref 35–48)
PCO2 BLDA: 57.1 MM HG (ref 35–48)
PEEP RESPIRATORY: 5 CM[H2O]
PH BLDA: 7.28 PH UNITS (ref 7.35–7.45)
PH BLDA: 7.35 PH UNITS (ref 7.35–7.45)
PHOSPHATE SERPL-MCNC: 3.9 MG/DL (ref 2.5–4.5)
PLATELET # BLD AUTO: 131 10*3/MM3 (ref 140–450)
PMV BLD AUTO: 10.4 FL (ref 6–12)
PO2 BLDA: 56.5 MM HG (ref 83–108)
PO2 BLDA: 62.9 MM HG (ref 83–108)
POTASSIUM SERPL-SCNC: 5 MMOL/L (ref 3.5–5.2)
QT INTERVAL: 319 MS
QT INTERVAL: 351 MS
QTC INTERVAL: 401 MS
QTC INTERVAL: 414 MS
RBC # BLD AUTO: 3.64 10*6/MM3 (ref 3.77–5.28)
RESPIRATORY RATE: 20
SAO2 % BLDCOA: 84.4 % (ref 94–98)
SAO2 % BLDCOA: 90.4 % (ref 94–98)
SCAN SLIDE: NORMAL
SMALL PLATELETS BLD QL SMEAR: ABNORMAL
SMUDGE CELLS BLD QL SMEAR: ABNORMAL
SODIUM SERPL-SCNC: 142 MMOL/L (ref 136–145)
VARIANT LYMPHS NFR BLD MANUAL: 67 % (ref 19.6–45.3)
VENTILATOR MODE: AC
VT ON VENT VENT: 450 ML
WBC NRBC COR # BLD: 16.1 10*3/MM3 (ref 3.4–10.8)

## 2023-10-15 PROCEDURE — 36600 WITHDRAWAL OF ARTERIAL BLOOD: CPT

## 2023-10-15 PROCEDURE — 94799 UNLISTED PULMONARY SVC/PX: CPT

## 2023-10-15 PROCEDURE — 87205 SMEAR GRAM STAIN: CPT | Performed by: INTERNAL MEDICINE

## 2023-10-15 PROCEDURE — 82948 REAGENT STRIP/BLOOD GLUCOSE: CPT

## 2023-10-15 PROCEDURE — 0BH17EZ INSERTION OF ENDOTRACHEAL AIRWAY INTO TRACHEA, VIA NATURAL OR ARTIFICIAL OPENING: ICD-10-PCS | Performed by: NURSE PRACTITIONER

## 2023-10-15 PROCEDURE — 25010000002 ENOXAPARIN PER 10 MG: Performed by: INTERNAL MEDICINE

## 2023-10-15 PROCEDURE — 25010000002 METOCLOPRAMIDE PER 10 MG: Performed by: INTERNAL MEDICINE

## 2023-10-15 PROCEDURE — 93005 ELECTROCARDIOGRAM TRACING: CPT | Performed by: NURSE PRACTITIONER

## 2023-10-15 PROCEDURE — 94002 VENT MGMT INPAT INIT DAY: CPT

## 2023-10-15 PROCEDURE — 85007 BL SMEAR W/DIFF WBC COUNT: CPT | Performed by: INTERNAL MEDICINE

## 2023-10-15 PROCEDURE — 71045 X-RAY EXAM CHEST 1 VIEW: CPT

## 2023-10-15 PROCEDURE — 63710000001 INSULIN LISPRO (HUMAN) PER 5 UNITS: Performed by: NURSE PRACTITIONER

## 2023-10-15 PROCEDURE — 93306 TTE W/DOPPLER COMPLETE: CPT

## 2023-10-15 PROCEDURE — 74018 RADEX ABDOMEN 1 VIEW: CPT

## 2023-10-15 PROCEDURE — 25010000002 LORAZEPAM PER 2 MG: Performed by: INTERNAL MEDICINE

## 2023-10-15 PROCEDURE — 85025 COMPLETE CBC W/AUTO DIFF WBC: CPT | Performed by: INTERNAL MEDICINE

## 2023-10-15 PROCEDURE — 82803 BLOOD GASES ANY COMBINATION: CPT

## 2023-10-15 PROCEDURE — 93306 TTE W/DOPPLER COMPLETE: CPT | Performed by: STUDENT IN AN ORGANIZED HEALTH CARE EDUCATION/TRAINING PROGRAM

## 2023-10-15 PROCEDURE — 25010000002 FENTANYL CITRATE (PF) 50 MCG/ML SOLUTION: Performed by: NURSE PRACTITIONER

## 2023-10-15 PROCEDURE — 25010000002 FENTANYL CITRATE (PF) 50 MCG/ML SOLUTION

## 2023-10-15 PROCEDURE — 25010000002 FUROSEMIDE PER 20 MG: Performed by: INTERNAL MEDICINE

## 2023-10-15 PROCEDURE — 80048 BASIC METABOLIC PNL TOTAL CA: CPT | Performed by: INTERNAL MEDICINE

## 2023-10-15 PROCEDURE — 25010000002 MIDAZOLAM PER 1 MG: Performed by: NURSE PRACTITIONER

## 2023-10-15 PROCEDURE — 84100 ASSAY OF PHOSPHORUS: CPT | Performed by: INTERNAL MEDICINE

## 2023-10-15 PROCEDURE — 97110 THERAPEUTIC EXERCISES: CPT

## 2023-10-15 PROCEDURE — 93010 ELECTROCARDIOGRAM REPORT: CPT | Performed by: STUDENT IN AN ORGANIZED HEALTH CARE EDUCATION/TRAINING PROGRAM

## 2023-10-15 PROCEDURE — 25010000002 METOCLOPRAMIDE PER 10 MG: Performed by: NURSE PRACTITIONER

## 2023-10-15 PROCEDURE — 83735 ASSAY OF MAGNESIUM: CPT | Performed by: INTERNAL MEDICINE

## 2023-10-15 PROCEDURE — 87070 CULTURE OTHR SPECIMN AEROBIC: CPT | Performed by: INTERNAL MEDICINE

## 2023-10-15 PROCEDURE — 25010000002 MORPHINE PER 10 MG: Performed by: FAMILY MEDICINE

## 2023-10-15 PROCEDURE — 94660 CPAP INITIATION&MGMT: CPT

## 2023-10-15 PROCEDURE — 94761 N-INVAS EAR/PLS OXIMETRY MLT: CPT

## 2023-10-15 PROCEDURE — 25010000002 CEFTRIAXONE PER 250 MG: Performed by: INTERNAL MEDICINE

## 2023-10-15 PROCEDURE — 5A1955Z RESPIRATORY VENTILATION, GREATER THAN 96 CONSECUTIVE HOURS: ICD-10-PCS | Performed by: INTERNAL MEDICINE

## 2023-10-15 PROCEDURE — 31500 INSERT EMERGENCY AIRWAY: CPT

## 2023-10-15 PROCEDURE — 25010000002 MIDAZOLAM PER 1 MG

## 2023-10-15 RX ORDER — FUROSEMIDE 10 MG/ML
40 INJECTION INTRAMUSCULAR; INTRAVENOUS EVERY 6 HOURS
Status: COMPLETED | OUTPATIENT
Start: 2023-10-15 | End: 2023-10-15

## 2023-10-15 RX ORDER — POLYETHYLENE GLYCOL 3350 17 G/17G
17 POWDER, FOR SOLUTION ORAL DAILY
Status: DISCONTINUED | OUTPATIENT
Start: 2023-10-16 | End: 2023-10-17

## 2023-10-15 RX ORDER — GUAIFENESIN 200 MG/1
400 TABLET ORAL EVERY 8 HOURS
Status: DISCONTINUED | OUTPATIENT
Start: 2023-10-15 | End: 2023-10-15

## 2023-10-15 RX ORDER — FENTANYL CITRATE 50 UG/ML
25 INJECTION, SOLUTION INTRAMUSCULAR; INTRAVENOUS
Status: DISCONTINUED | OUTPATIENT
Start: 2023-10-15 | End: 2023-10-17

## 2023-10-15 RX ORDER — LEVOTHYROXINE SODIUM 0.1 MG/1
100 TABLET ORAL
Status: DISCONTINUED | OUTPATIENT
Start: 2023-10-16 | End: 2023-11-06 | Stop reason: HOSPADM

## 2023-10-15 RX ORDER — LANSOPRAZOLE 30 MG/1
30 TABLET, ORALLY DISINTEGRATING, DELAYED RELEASE ORAL
Status: DISCONTINUED | OUTPATIENT
Start: 2023-10-16 | End: 2023-11-01

## 2023-10-15 RX ORDER — AMOXICILLIN 250 MG
1 CAPSULE ORAL 2 TIMES DAILY
Status: DISCONTINUED | OUTPATIENT
Start: 2023-10-15 | End: 2023-10-20

## 2023-10-15 RX ORDER — MIDAZOLAM HYDROCHLORIDE 1 MG/ML
2 INJECTION INTRAMUSCULAR; INTRAVENOUS
Status: DISCONTINUED | OUTPATIENT
Start: 2023-10-15 | End: 2023-10-22

## 2023-10-15 RX ORDER — MIDAZOLAM HYDROCHLORIDE 1 MG/ML
6 INJECTION INTRAMUSCULAR; INTRAVENOUS ONCE
Status: COMPLETED | OUTPATIENT
Start: 2023-10-15 | End: 2023-10-15

## 2023-10-15 RX ORDER — ETOMIDATE 2 MG/ML
INJECTION INTRAVENOUS
Status: COMPLETED
Start: 2023-10-15 | End: 2023-10-15

## 2023-10-15 RX ORDER — MIDAZOLAM HYDROCHLORIDE 1 MG/ML
INJECTION INTRAMUSCULAR; INTRAVENOUS
Status: COMPLETED
Start: 2023-10-15 | End: 2023-10-15

## 2023-10-15 RX ORDER — BISACODYL 10 MG
10 SUPPOSITORY, RECTAL RECTAL DAILY PRN
Status: DISCONTINUED | OUTPATIENT
Start: 2023-10-15 | End: 2023-10-20

## 2023-10-15 RX ORDER — KETAMINE HCL IN 0.9 % NACL 50 MG/5 ML
50 SYRINGE (ML) INTRAVENOUS ONCE
Qty: 5 ML | Refills: 0 | Status: DISCONTINUED | OUTPATIENT
Start: 2023-10-15 | End: 2023-10-16

## 2023-10-15 RX ORDER — POLYETHYLENE GLYCOL 3350 17 G/17G
17 POWDER, FOR SOLUTION ORAL ONCE
Status: COMPLETED | OUTPATIENT
Start: 2023-10-15 | End: 2023-10-15

## 2023-10-15 RX ORDER — BISACODYL 5 MG/1
5 TABLET, DELAYED RELEASE ORAL DAILY PRN
Status: DISCONTINUED | OUTPATIENT
Start: 2023-10-15 | End: 2023-10-20

## 2023-10-15 RX ORDER — FENTANYL CITRATE 50 UG/ML
25 INJECTION, SOLUTION INTRAMUSCULAR; INTRAVENOUS
Status: DISCONTINUED | OUTPATIENT
Start: 2023-10-15 | End: 2023-10-28

## 2023-10-15 RX ORDER — AMLODIPINE BESYLATE 5 MG/1
5 TABLET ORAL DAILY
Status: DISCONTINUED | OUTPATIENT
Start: 2023-10-15 | End: 2023-10-15

## 2023-10-15 RX ORDER — ROSUVASTATIN CALCIUM 10 MG/1
10 TABLET, COATED ORAL NIGHTLY
Status: DISCONTINUED | OUTPATIENT
Start: 2023-10-15 | End: 2023-11-06 | Stop reason: HOSPADM

## 2023-10-15 RX ORDER — METOCLOPRAMIDE HYDROCHLORIDE 5 MG/ML
10 INJECTION INTRAMUSCULAR; INTRAVENOUS EVERY 6 HOURS
Status: DISCONTINUED | OUTPATIENT
Start: 2023-10-15 | End: 2023-10-15

## 2023-10-15 RX ORDER — AMLODIPINE BESYLATE 5 MG/1
5 TABLET ORAL DAILY
Status: DISCONTINUED | OUTPATIENT
Start: 2023-10-16 | End: 2023-10-18

## 2023-10-15 RX ORDER — METOCLOPRAMIDE HYDROCHLORIDE 5 MG/ML
5 INJECTION INTRAMUSCULAR; INTRAVENOUS EVERY 8 HOURS
Status: DISCONTINUED | OUTPATIENT
Start: 2023-10-15 | End: 2023-10-21

## 2023-10-15 RX ORDER — DEXMEDETOMIDINE HYDROCHLORIDE 4 UG/ML
.2-1.5 INJECTION, SOLUTION INTRAVENOUS
Status: DISCONTINUED | OUTPATIENT
Start: 2023-10-15 | End: 2023-10-17

## 2023-10-15 RX ORDER — LACTULOSE 10 G/15ML
30 SOLUTION ORAL ONCE
Status: COMPLETED | OUTPATIENT
Start: 2023-10-15 | End: 2023-10-15

## 2023-10-15 RX ORDER — ETOMIDATE 2 MG/ML
20 INJECTION INTRAVENOUS ONCE
Status: COMPLETED | OUTPATIENT
Start: 2023-10-15 | End: 2023-10-15

## 2023-10-15 RX ORDER — FENTANYL CITRATE 50 UG/ML
INJECTION, SOLUTION INTRAMUSCULAR; INTRAVENOUS
Status: COMPLETED
Start: 2023-10-15 | End: 2023-10-15

## 2023-10-15 RX ORDER — POLYETHYLENE GLYCOL 3350 17 G/17G
17 POWDER, FOR SOLUTION ORAL DAILY PRN
Status: DISCONTINUED | OUTPATIENT
Start: 2023-10-15 | End: 2023-10-20

## 2023-10-15 RX ORDER — BUDESONIDE 0.5 MG/2ML
0.5 INHALANT ORAL EVERY 12 HOURS
Status: DISCONTINUED | OUTPATIENT
Start: 2023-10-15 | End: 2023-10-22

## 2023-10-15 RX ORDER — METOCLOPRAMIDE HYDROCHLORIDE 5 MG/ML
5 INJECTION INTRAMUSCULAR; INTRAVENOUS EVERY 6 HOURS
Status: DISCONTINUED | OUTPATIENT
Start: 2023-10-15 | End: 2023-10-15

## 2023-10-15 RX ORDER — PANTOPRAZOLE SODIUM 40 MG/10ML
40 INJECTION, POWDER, LYOPHILIZED, FOR SOLUTION INTRAVENOUS
Status: DISCONTINUED | OUTPATIENT
Start: 2023-10-15 | End: 2023-10-15

## 2023-10-15 RX ORDER — GUAIFENESIN 200 MG/1
400 TABLET ORAL EVERY 8 HOURS
Status: DISCONTINUED | OUTPATIENT
Start: 2023-10-15 | End: 2023-10-22

## 2023-10-15 RX ORDER — MIDAZOLAM HYDROCHLORIDE 1 MG/ML
2 INJECTION INTRAMUSCULAR; INTRAVENOUS ONCE
Status: COMPLETED | OUTPATIENT
Start: 2023-10-15 | End: 2023-10-15

## 2023-10-15 RX ORDER — LACTULOSE 10 G/15ML
30 SOLUTION ORAL ONCE
Status: DISCONTINUED | OUTPATIENT
Start: 2023-10-15 | End: 2023-10-15

## 2023-10-15 RX ORDER — INSULIN LISPRO 100 [IU]/ML
2-7 INJECTION, SOLUTION INTRAVENOUS; SUBCUTANEOUS EVERY 6 HOURS SCHEDULED
Status: DISCONTINUED | OUTPATIENT
Start: 2023-10-15 | End: 2023-10-20

## 2023-10-15 RX ADMIN — INSULIN LISPRO 2 UNITS: 100 INJECTION, SOLUTION INTRAVENOUS; SUBCUTANEOUS at 23:41

## 2023-10-15 RX ADMIN — MORPHINE SULFATE 1 MG: 2 INJECTION, SOLUTION INTRAMUSCULAR; INTRAVENOUS at 00:48

## 2023-10-15 RX ADMIN — DEXMEDETOMIDINE HYDROCHLORIDE 0.6 MCG/KG/HR: 4 INJECTION, SOLUTION INTRAVENOUS at 14:08

## 2023-10-15 RX ADMIN — Medication 10 ML: at 21:09

## 2023-10-15 RX ADMIN — GUAIFENESIN 400 MG: 200 TABLET ORAL at 21:08

## 2023-10-15 RX ADMIN — DEXMEDETOMIDINE HYDROCHLORIDE 1.5 MCG/KG/HR: 4 INJECTION, SOLUTION INTRAVENOUS at 17:35

## 2023-10-15 RX ADMIN — ETOMIDATE 20 MG: 20 INJECTION, SOLUTION INTRAVENOUS at 13:56

## 2023-10-15 RX ADMIN — LORAZEPAM 1 MG: 2 INJECTION INTRAMUSCULAR; INTRAVENOUS at 05:29

## 2023-10-15 RX ADMIN — FENTANYL CITRATE 25 MCG: 50 INJECTION, SOLUTION INTRAMUSCULAR; INTRAVENOUS at 16:30

## 2023-10-15 RX ADMIN — Medication 10 ML: at 08:01

## 2023-10-15 RX ADMIN — Medication 10 ML: at 08:00

## 2023-10-15 RX ADMIN — METOCLOPRAMIDE 5 MG: 5 INJECTION, SOLUTION INTRAMUSCULAR; INTRAVENOUS at 21:10

## 2023-10-15 RX ADMIN — ETOMIDATE 20 MG: 2 INJECTION INTRAVENOUS at 13:56

## 2023-10-15 RX ADMIN — FENTANYL CITRATE 25 MCG: 50 INJECTION, SOLUTION INTRAMUSCULAR; INTRAVENOUS at 13:46

## 2023-10-15 RX ADMIN — Medication 10 ML: at 21:02

## 2023-10-15 RX ADMIN — ROSUVASTATIN 10 MG: 10 TABLET, FILM COATED ORAL at 21:08

## 2023-10-15 RX ADMIN — LORAZEPAM 1 MG: 2 INJECTION INTRAMUSCULAR; INTRAVENOUS at 00:48

## 2023-10-15 RX ADMIN — DEXMEDETOMIDINE HYDROCHLORIDE 1.5 MCG/KG/HR: 4 INJECTION, SOLUTION INTRAVENOUS at 21:02

## 2023-10-15 RX ADMIN — MIDAZOLAM 2 MG: 1 INJECTION INTRAMUSCULAR; INTRAVENOUS at 19:11

## 2023-10-15 RX ADMIN — LACTULOSE 30 G: 20 SOLUTION ORAL at 21:06

## 2023-10-15 RX ADMIN — METOCLOPRAMIDE 10 MG: 5 INJECTION, SOLUTION INTRAMUSCULAR; INTRAVENOUS at 08:00

## 2023-10-15 RX ADMIN — POLYETHYLENE GLYCOL 3350 17 G: 17 POWDER, FOR SOLUTION ORAL at 23:20

## 2023-10-15 RX ADMIN — DEXMEDETOMIDINE HYDROCHLORIDE 1.5 MCG/KG/HR: 4 INJECTION, SOLUTION INTRAVENOUS at 23:41

## 2023-10-15 RX ADMIN — FENTANYL CITRATE 25 MCG: 50 INJECTION, SOLUTION INTRAMUSCULAR; INTRAVENOUS at 14:00

## 2023-10-15 RX ADMIN — MIDAZOLAM 6 MG: 1 INJECTION INTRAMUSCULAR; INTRAVENOUS at 14:44

## 2023-10-15 RX ADMIN — MIDAZOLAM HYDROCHLORIDE 2 MG: 1 INJECTION INTRAMUSCULAR; INTRAVENOUS at 13:48

## 2023-10-15 RX ADMIN — MIDAZOLAM HYDROCHLORIDE 6 MG: 1 INJECTION INTRAMUSCULAR; INTRAVENOUS at 14:44

## 2023-10-15 RX ADMIN — LORAZEPAM 1 MG: 2 INJECTION INTRAMUSCULAR; INTRAVENOUS at 21:02

## 2023-10-15 RX ADMIN — MORPHINE SULFATE 1 MG: 2 INJECTION, SOLUTION INTRAMUSCULAR; INTRAVENOUS at 10:53

## 2023-10-15 RX ADMIN — FUROSEMIDE 40 MG: 10 INJECTION, SOLUTION INTRAMUSCULAR; INTRAVENOUS at 14:16

## 2023-10-15 RX ADMIN — PANTOPRAZOLE SODIUM 40 MG: 40 INJECTION, POWDER, LYOPHILIZED, FOR SOLUTION INTRAVENOUS at 08:00

## 2023-10-15 RX ADMIN — BISACODYL 10 MG: 10 SUPPOSITORY RECTAL at 23:20

## 2023-10-15 RX ADMIN — BUDESONIDE 0.5 MG: 0.5 INHALANT RESPIRATORY (INHALATION) at 19:46

## 2023-10-15 RX ADMIN — ENOXAPARIN SODIUM 30 MG: 100 INJECTION SUBCUTANEOUS at 16:30

## 2023-10-15 RX ADMIN — FUROSEMIDE 40 MG: 10 INJECTION, SOLUTION INTRAMUSCULAR; INTRAVENOUS at 21:08

## 2023-10-15 RX ADMIN — MIDAZOLAM 2 MG: 1 INJECTION INTRAMUSCULAR; INTRAVENOUS at 13:48

## 2023-10-15 RX ADMIN — SENNOSIDES AND DOCUSATE SODIUM 1 TABLET: 50; 8.6 TABLET ORAL at 21:08

## 2023-10-15 RX ADMIN — CEFTRIAXONE 2000 MG: 2 INJECTION, POWDER, FOR SOLUTION INTRAMUSCULAR; INTRAVENOUS at 09:43

## 2023-10-15 RX ADMIN — INSULIN LISPRO 2 UNITS: 100 INJECTION, SOLUTION INTRAVENOUS; SUBCUTANEOUS at 17:40

## 2023-10-15 RX ADMIN — METOCLOPRAMIDE 5 MG: 5 INJECTION, SOLUTION INTRAMUSCULAR; INTRAVENOUS at 14:15

## 2023-10-15 RX ADMIN — FENTANYL CITRATE 25 MCG: 50 INJECTION, SOLUTION INTRAMUSCULAR; INTRAVENOUS at 21:01

## 2023-10-15 RX ADMIN — MORPHINE SULFATE 1 MG: 2 INJECTION, SOLUTION INTRAMUSCULAR; INTRAVENOUS at 05:29

## 2023-10-15 NOTE — PLAN OF CARE
Assessment: Diane Guan presents with functional mobility impairments which indicate the need for skilled intervention. Tolerating session today without incident. Pt not doing as well as last treatment, very restless and confused, declined in resp status and needed airvo vs AVAPS. May need to downgrade goals.Plans on rehab at [place].Will continue to follow and progress as tolerated.

## 2023-10-15 NOTE — SIGNIFICANT NOTE
Pt refusing NIV and O2 Sat 85% on humidified HF NC. Anita Day, APRN called to bedside to assess pt airway.

## 2023-10-15 NOTE — THERAPY TREATMENT NOTE
"Subjective: Pt agreeable to therapeutic plan of care.Pt continues to decline each day per dtr report.RN oked to do bed exs and she was going to put her back on AVAPs from airvo afterwards.     Objective:     Bed mobility - N/A or Not attempted.  Transfers - N/A or Not attempted.  Ambulation -  feet N/A or Not attempted.    Therapeutic Exercise - 10 Reps B LE AAROM lying supine    Vitals: Desaturates and Tachycardic pt on airvo and sats 87%m     Pain: 0 VAS       Education: Provided education on the importance of mobility in the acute care setting and Verbal/Tactile Cues    Assessment: Diane Guan presents with functional mobility impairments which indicate the need for skilled intervention. Tolerating session today without incident. Pt not doing as well as last treatment, very restless and confused, declined in resp status and needed airvo vs AVAPS. May need to downgrade goals.Plans on rehab at [place].Will continue to follow and progress as tolerated.     Plan/Recommendations:   Moderate Intensity Therapy recommended post-acute care. This is recommended as therapy feels the patient would require 3-4 days per week and wouldn't tolerate \"3 hour daily\" rehab intensity. SNF would be the preferred choice. If the patient does not agree to SNF, arrange HH or OP depending on home bound status. If patient is medically complex, consider LTACH.. Pt requires no DME at discharge.     Pt desires Skilled Rehab placement at discharge. Pt cooperative; agreeable to therapeutic recommendations and plan of care.         Basic Mobility 6-click:  Rollin = Total, A lot = 2, A little = 3; 4 = None  Supine>Sit:   1 = Total, A lot = 2, A little = 3; 4 = None   Sit>Stand with arms:  1 = Total, A lot = 2, A little = 3; 4 = None  Bed>Chair:   1 = Total, A lot = 2, A little = 3; 4 = None  Ambulate in room:  1 = Total, A lot = 2, A little = 3; 4 = None  3-5 Steps with railin = Total, A lot = 2, A little = 3; 4 = " None  Score: 8      Post-Tx Position: Supine with HOB Elevated, Alarms activated, and Call light and personal items within reach  PPE: gloves and surgical mask

## 2023-10-15 NOTE — PROGRESS NOTES
Pulled back ETT 3 cm per LUCINA Wright. ETT is now @22 @[facility]    Joana Guerin RRT

## 2023-10-15 NOTE — PLAN OF CARE
Goal Outcome Evaluation:                 Pt unable to tolerate NIV and was intubated today. Unsuccessful NG tube insertion, Provider SIOMARA Hagen aware.

## 2023-10-15 NOTE — PLAN OF CARE
Goal Outcome Evaluation:         Pt vss through the night. Pt has still not had BM since last documented on 10/9/2023. Nurse kept pt npo; pt very confused and lethargic. Administered tap water enema with no changes. Continuous bipap was restarted.

## 2023-10-15 NOTE — PROCEDURES
Endotracheal Intubation  Date: 10/15/2023  Time: 1345    Indication: Respiratory Failure    A time-out was completed verifying correct patient, procedure, positioning. The procedure had been discussed with the patient's daughter earlier in the day by Dr. Heredia and by the patient's nurse just prior to the procedure.  Full Code status was verified. The patient was placed in a flat position. Sedation was obtained using Versed, fentanyl and etomidate. The patient was easily ventilated using an Ambu bag. A Glidescope MAC 4 blade was used and inserted into the oropharynx at which time there was a Grade 1 view of the vocal cords. A 7.5 endotracheal tube was inserted and visualized going through the vocal cords. The stylette was removed. Colorimetric change was visualized on the CO2 meter. Breath sounds were heard in bilateral lung fields equally. There was no insufflation auscultated over the epigastrium.  The endotracheal tube was placed at 23 cm, measured at the teeth. A large amount of thick grey secretions were in-line suctioned from the ETT and sent for culture.   A chest x-ray was ordered to verify endotracheal tube placement.     The patient tolerated the procedure well and there were no immediate complications.

## 2023-10-15 NOTE — PROGRESS NOTES
"RENAL/KCC:     LOS: 3 days    Patient Care Team:  Asia Queen APRN as PCP - General (Nurse Practitioner)  Asia Queen APRN as Nurse Practitioner (Nurse Practitioner)  Ling Bolden MD as Consulting Physician (Hematology and Oncology)    Chief Complaint:  Acute resp failure    Subjective     Interval History:   Chart reviewed  Seen in the ICU  On BiPAP  FC in place    Objective     Vital Sign Min/Max for last 24 hours  Temp  Min: 97.7 °F (36.5 °C)  Max: 98.9 °F (37.2 °C)   BP  Min: 106/53  Max: 178/69   Pulse  Min: 80  Max: 97   Resp  Min: 23  Max: 24   SpO2  Min: 90 %  Max: 99 %   Flow (L/min)  Min: 40  Max: 40   Weight  Min: 80.3 kg (177 lb)  Max: 80.3 kg (177 lb)     Flowsheet Rows      Flowsheet Row First Filed Value   Admission Height 162.6 cm (64\") Documented at 10/10/2023 1252   Admission Weight 78.5 kg (173 lb) Documented at 10/10/2023 1252            I/O this shift:  In: -   Out: 200 [Urine:200]  I/O last 3 completed shifts:  In: -   Out: 2000 [Urine:2000]    Physical Exam:  GEN: Awake, on BiPAP  ENT: PERRL, EOMI, MMM  NECK: Supple, no JVD  CHEST: CTAB, no W/R/C  CV: RRR, no M/G/R  ABD: Soft, NT, +BS  SKIN: Warm and Dry  NEURO: CN's intact      WBC WBC   Date Value Ref Range Status   10/15/2023 16.10 (H) 3.40 - 10.80 10*3/mm3 Final   10/13/2023 28.80 (H) 3.40 - 10.80 10*3/mm3 Final      HGB Hemoglobin   Date Value Ref Range Status   10/15/2023 9.9 (L) 12.0 - 15.9 g/dL Final   10/14/2023 12.5 12.0 - 17.0 g/dL Final   10/13/2023 11.1 (L) 12.0 - 15.9 g/dL Final      HCT Hematocrit   Date Value Ref Range Status   10/15/2023 32.8 (L) 34.0 - 46.6 % Final   10/14/2023 37 (L) 38 - 51 % Final   10/13/2023 37.6 34.0 - 46.6 % Final      Platlets No results found for: \"LABPLAT\"   MCV MCV   Date Value Ref Range Status   10/15/2023 90.1 79.0 - 97.0 fL Final   10/13/2023 91.4 79.0 - 97.0 fL Final          Sodium Sodium   Date Value Ref Range Status   10/15/2023 142 136 - 145 mmol/L Final   10/14/2023 " 139 136 - 145 mmol/L Final   10/13/2023 136 136 - 145 mmol/L Final   10/13/2023 138 136 - 145 mmol/L Final   10/12/2023 136 136 - 145 mmol/L Final      Potassium Potassium   Date Value Ref Range Status   10/15/2023 5.0 3.5 - 5.2 mmol/L Final     Comment:     Slight hemolysis detected by analyzer. Results may be affected.   10/14/2023 5.2 3.5 - 5.2 mmol/L Final     Comment:     Slight hemolysis detected by analyzer. Results may be affected.   10/14/2023 6.0 (H) 3.5 - 5.2 mmol/L Final   10/13/2023 5.6 (H) 3.5 - 5.2 mmol/L Final     Comment:     Slight hemolysis detected by analyzer. Results may be affected.   10/13/2023 6.3 (C) 3.5 - 5.2 mmol/L Final     Comment:     Slight hemolysis detected by analyzer. Results may be affected.   10/13/2023 5.7 (H) 3.5 - 5.2 mmol/L Final   10/12/2023 5.7 (H) 3.5 - 5.2 mmol/L Final     Comment:     Slight hemolysis detected by analyzer. Results may be affected.      Chloride Chloride   Date Value Ref Range Status   10/15/2023 107 98 - 107 mmol/L Final   10/14/2023 104 98 - 107 mmol/L Final   10/13/2023 105 98 - 107 mmol/L Final   10/13/2023 105 98 - 107 mmol/L Final   10/12/2023 106 98 - 107 mmol/L Final      CO2 CO2   Date Value Ref Range Status   10/15/2023 25.0 22.0 - 29.0 mmol/L Final   10/14/2023 22.0 22.0 - 29.0 mmol/L Final   10/13/2023 18.0 (L) 22.0 - 29.0 mmol/L Final   10/13/2023 23.0 22.0 - 29.0 mmol/L Final   10/12/2023 18.0 (L) 22.0 - 29.0 mmol/L Final      BUN BUN   Date Value Ref Range Status   10/15/2023 52 (H) 8 - 23 mg/dL Final   10/14/2023 49 (H) 8 - 23 mg/dL Final   10/13/2023 47 (H) 8 - 23 mg/dL Final   10/13/2023 45 (H) 8 - 23 mg/dL Final   10/12/2023 42 (H) 8 - 23 mg/dL Final      Creatinine Creatinine   Date Value Ref Range Status   10/15/2023 1.43 (H) 0.57 - 1.00 mg/dL Final   10/14/2023 1.55 (H) 0.57 - 1.00 mg/dL Final   10/13/2023 1.46 (H) 0.57 - 1.00 mg/dL Final   10/13/2023 1.52 (H) 0.57 - 1.00 mg/dL Final   10/12/2023 1.51 (H) 0.57 - 1.00 mg/dL Final     "  Calcium Calcium   Date Value Ref Range Status   10/15/2023 9.3 8.6 - 10.5 mg/dL Final   10/14/2023 9.3 8.6 - 10.5 mg/dL Final   10/13/2023 9.3 8.6 - 10.5 mg/dL Final   10/13/2023 9.3 8.6 - 10.5 mg/dL Final   10/12/2023 9.0 8.6 - 10.5 mg/dL Final      PO4 No results found for: \"CAPO4\"   Albumin No results found for: \"ALBUMIN\"   Magnesium Magnesium   Date Value Ref Range Status   10/15/2023 1.9 1.6 - 2.4 mg/dL Final      Uric Acid No results found for: \"URICACID\"        Results Review:     I reviewed the patient's new clinical results.    amLODIPine, 5 mg, Oral, Daily  anastrozole, 1 mg, Oral, Daily  budesonide-formoterol, 2 puff, Inhalation, BID - RT  cefTRIAXone, 2,000 mg, Intravenous, Q24H  cetirizine, 10 mg, Oral, Nightly  chlorthalidone, 25 mg, Oral, Daily  enoxaparin, 30 mg, Subcutaneous, Daily  ferrous sulfate, 324 mg, Oral, Once per day on Mon Wed Fri  insulin lispro, 2-7 Units, Subcutaneous, 4x Daily AC & at Bedtime  levothyroxine, 100 mcg, Oral, Q AM  metoclopramide, 5 mg, Intravenous, Q6H  mirtazapine, 15 mg, Oral, Nightly  pantoprazole, 40 mg, Intravenous, Q AM  polyethylene glycol, 17 g, Oral, Daily  rOPINIRole, 0.25 mg, Oral, Nightly  rosuvastatin, 10 mg, Oral, Nightly  senna-docusate sodium, 1 tablet, Oral, BID  sertraline, 150 mg, Oral, Daily  sodium chloride, 10 mL, Intravenous, Q12H  sodium chloride, 10 mL, Intravenous, Q12H  sodium zirconium cyclosilicate, 10 g, Oral, TID  sodium zirconium cyclosilicate, 10 g, Oral, Once           Medication Review: Reviewed    Assessment & Plan     LEXUS/CKD3  Hyperkalemia    Acute hypoxemic respiratory failure    Mixed anxiety and depressive disorder    Primary osteoarthritis involving multiple joints    Hiatal hernia with gastroesophageal reflux    Mixed hyperlipidemia    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    Acquired hypothyroidism    Type 2 diabetes mellitus with stage 3b chronic kidney disease, without long-term current use of insulin    " Insomnia    Overactive bladder    Vitamin B 12 deficiency    Vitamin D deficiency    LIBBY (obstructive sleep apnea)    COPD (chronic obstructive pulmonary disease)    CKD (chronic kidney disease) stage 3, GFR 30-59 ml/min    History of breast cancer    Personal history of CLL (chronic lymphocytic leukemia)    History of cigarette smoking    Fall at home    Rhinovirus infection    Acute respiratory failure    Hyperkalemia      Plan: Cr narrowly better at 1.4 and hyperkalemia improved.  FC in place for urinary retention.  Remains on Thiazide - monitor renal function and BP closely.  Redose Lasix today.  On Rocephin and f/u final urine Cx.      Frederick Bonds MD  [facility]  10/15/23  11:50 EDT

## 2023-10-15 NOTE — PROGRESS NOTES
Critical Care Progress Note   Diane Guan : 1940 MRN:[id number] LOS:3     Principal Problem: Acute hypoxemic respiratory failure     Reason for follow up: All the medical problems listed below    Summary     The following information has been supplemented by review of documentation, collaboration with other providers, and interview with the patient's daughter at bedside as the patient is confused at the time of examination.  The patient presented to the emergency department on 10/10/2023 for evaluation of right chest wall pain following a fall at home.  The patient's daughter reports that the patient lost her balance and fell when she got up in the dark to go to the restroom.  She apparently had preached for the light switch at which time stumbled and fell with her right side hitting the elevated toilet seat and then fell to her right side striking the floor.  There was no report of LOC and following the fall the patient was not confused.  The patient's daughter also reports that the patient had complained of a cough productive of clear secretions approximately 3 weeks ago.  On  the patient was evaluated at an urgent care facility where she was prescribed a Z-Erick however had no improvement.     On evaluation in the emergency department the patient was found to have hypoxic/hypercapnic respiratory failure which responded well to O2 therapy.  Her home dose diuretics were continued.  She was admitted for further evaluation and treatment to the hospitalist.  The patient's daughter states that at approximately 5 PM yesterday evening the patient developed worsening confusion and hallucinations.  Overnight the patient developed worsening O2 needs and hypercapnia requiring increased supplementation to BiPAP therapy.    ACP: No ACP documentation on file.  Patient is currently confused however her adult daughter is at bedside and the patient indicates that decisions can be made by her.     Significant events      10/15/23 : Pt transitioned to ICU level care 2/2 worsening respiratory status.  D/w pt's daughter and she confirmed pt full code and wants intubation if needed.  ABG off BiPAP showed significant respiratory acidosis and hypoxemia.  Pt intermittently agitated with BiPAP--responded to a dose of morphine.  No further fevers last 24h.  Had 2 L UOP last 24h, net -915 mL.  Cr stable.  WBC improved from 28.8 to 16.  Hb dropped from 11.1 to 9.9. PCT normal. CXR not that impressive, with only faint left basilar infiltrate.     Assessment / Plan     Acute respiratory failure with hypoxia and hypercapnia  Multifactorial with AE COPD, volume overload with small left pleural effusion, splinting due to pain with inspiration   Likely due to fluid volume overload and mild/acute exacerbation COPD  BiPAP settings noted and adjusted as needed.  Airvo as tolerated  Close monitoring of ABG as clinically indicated.   Avoid sedating medications  Scheduled Symbicort, as needed DuoNebs  Procalcitonin normal  Echocardiogram 1/6/2021, EF 65%  Repeat echocardiogram pending  Continue chlorthalidone per home dose.  Further diuresis per nephrology  Diuresis per nephrology  Encourage I-S and flutter while awake  Pt at high risk for intubation     Diabetes melitis type II  Hold home dose metformin due to LEXUS  Accu-Cheks before meals and at bedtime with SSI for tight glycemic control  Hemoglobin A1c pending     Acute metabolic encephalopathy  Likely metabolic secondary to hypercapnia and hypoxia  Patient has not returned to their baseline at this time.    Urinalysis with reflex culture pending  TSH/free T4 pending  CT head pending     Leukocytosis, chronic secondary to CLL  Afebrile  Urinalysis with reflex culture pending  Blood cultures pending  Monitor off antibiotics for now     Essential hypertension  Continue home dose Norvasc  Titrate meds as needed     Acute kidney injury on CKD stage III, baseline creatinine 1.3  Acute urinary  retention  Hyperkalemia  Remains non oliguric.   Likely prerenal. Possible intrinsic component due to ATN from infection, drugs and hypotension.   C/w IV hydration.   Nephrology consulted  Renal ultrasound pending  Monitor Input/Output very closely.   Received hyper-K treatment this morning.  Lasix x1 per nephrology now  Place Bellaym catheter for now to relieve urinary retention and close I/O monitoring  Net IO Since Admission: 1,160 mL [10/14/23 1241]       Diabetes mellitus type 2  Hold home medications due to LEXUS  SSI for tight glycemic control  Hemoglobin A1c     Constipation with abdominal discomfort  KUB negative for obstructive pattern, moderate stool burden  Schedule bowel regimen  Dulcolax suppository x1 now, add enema if nonproductive  Continue Reglan, EKG in a.m.     Chronic and stable  Hypothyroidism--continue levothyroxine.  TSH/free T4 pending  Hyperlipidemia--continue statin therapy  Breast cancer, s/p right mastectomy.  Anastrozole since 2019--continue  CLL, leukocytosis at baseline  Mycobacterium AVM complex, s/p antibiotic regimen  Falls at home/functional decline            Code status:   Level Of Support Discussed With: Patient  Code Status (Patient has no pulse and is not breathing): CPR (Attempt to Resuscitate)  Medical Interventions (Patient has pulse or is breathing): Full Support       Nutrition: Diet: Cardiac Diets, Diabetic Diets; Healthy Heart (2-3 Na+); Consistent Carbohydrate; Texture: Regular Texture (IDDSI 7); Fluid Consistency: Thin (IDDSI 0)   Patient isn't on Tube Feeding    DVT prophylaxis:  Medical DVT prophylaxis orders are present.     Subjective / Review of systems     Review of Systems   Unable to be obtained 2/2 pt's current condition.  Objective / Physical Exam   Vital signs:  Temp: 97.8 °F (36.6 °C)  BP: 108/87  Heart Rate: 93  Resp: 24  SpO2: 90 %  Weight: 80.3 kg (177 lb)    Admission Weight: Weight: 78.5 kg (173 lb)  Current Weight: Weight: 80.3 kg (177  lb)    Input/Output in last 24 hours:    Intake/Output Summary (Last 24 hours) at 10/15/2023 1144  Last data filed at 10/15/2023 1100  Gross per 24 hour   Intake --   Output 2200 ml   Net -2200 ml      Physical Exam     GEN:  Elderly woman, lethargic, confused, on AVAPS.  NEURO:  Brainstem reflexes intact.  No obvious focal deficit.  Moves all 4 ext.  HEENT:  N/AT.  PERRL.  MMM.  Oropharynx non-erythematous.  No drainage from the eyes/ears/nose.  No conjunctival petechiae.  No oral thrush.   NECK:  Supple, NT, trachea midline.  No meningismus.    CHEST/LUNGS:  Breath sounds are diminished with coarse rhonchi bilaterally.  Chest excursion equal bilaterally.    CARDIOVASCULAR:  RRR w/o murmur noted.  PMI not displaced.  GI:  Abdomen soft, NT, ND, +BS.   :  Deferred.  EXTREMITIES:  No deformity or amputation.  No cyanosis, trace edema, no asymmetry.  Pulses 2+ and equal in BLE's.    SKIN:  Warm, dry, and pink.  No rash, breakdown, or track marks noted.  LYMPHATICS/HEME:  No overt LAD or abnormal bruising.  No lymphedema.  MSK:  Normal ROM.  No joint abnormalities noted.  Strength is 5/5 and equal in BUE and BLE's.  PSYCH:  Unable to be obtained 2/2 pt's current condition.          Radiology and Labs     Results from last 7 days   Lab Units 10/15/23  0616 10/14/23  0753 10/13/23  2356 10/11/23  1454 10/10/23  1510   WBC 10*3/mm3 16.10*  --  28.80* 26.30* 30.60*   HEMATOCRIT % 32.8*  --  37.6 34.4 38.4   HEMATOCRIT POC %  --  37*  --   --   --    PLATELETS 10*3/mm3 131*  --  148 158 183      Results from last 7 days   Lab Units 10/15/23  0616 10/14/23  1620 10/14/23  0425 10/13/23  2356 10/13/23  2040 10/13/23  0936 10/12/23  1529   SODIUM mmol/L 142  --  139 136  --  138 136   POTASSIUM mmol/L 5.0 5.2 6.0* 5.6* 6.3* 5.7* 5.7*   CHLORIDE mmol/L 107  --  104 105  --  105 106   CO2 mmol/L 25.0  --  22.0 18.0*  --  23.0 18.0*   BUN mg/dL 52*  --  49* 47*  --  45* 42*   CREATININE mg/dL 1.43*  --  1.55* 1.46*  --  1.52*  1.51*      Current medications   Scheduled Meds: amLODIPine, 5 mg, Oral, Daily  anastrozole, 1 mg, Oral, Daily  budesonide-formoterol, 2 puff, Inhalation, BID - RT  cefTRIAXone, 2,000 mg, Intravenous, Q24H  cetirizine, 10 mg, Oral, Nightly  chlorthalidone, 25 mg, Oral, Daily  enoxaparin, 30 mg, Subcutaneous, Daily  ferrous sulfate, 324 mg, Oral, Once per day on Mon Wed Fri  insulin lispro, 2-7 Units, Subcutaneous, 4x Daily AC & at Bedtime  levothyroxine, 100 mcg, Oral, Q AM  metoclopramide, 5 mg, Intravenous, Q6H  mirtazapine, 15 mg, Oral, Nightly  pantoprazole, 40 mg, Intravenous, Q AM  polyethylene glycol, 17 g, Oral, Daily  rOPINIRole, 0.25 mg, Oral, Nightly  rosuvastatin, 10 mg, Oral, Nightly  senna-docusate sodium, 1 tablet, Oral, BID  sertraline, 150 mg, Oral, Daily  sodium chloride, 10 mL, Intravenous, Q12H  sodium chloride, 10 mL, Intravenous, Q12H  sodium zirconium cyclosilicate, 10 g, Oral, TID  sodium zirconium cyclosilicate, 10 g, Oral, Once      Continuous Infusions:      Plan discussed with RN. Reviewed all other data in the last 24 hours, including but not limited to vitals, labs, microbiology, imaging and pertinent notes from other providers.     Vinicius Heredia DO   Critical Care  10/15/23   11:44 EDT

## 2023-10-16 ENCOUNTER — APPOINTMENT (OUTPATIENT)
Dept: GENERAL RADIOLOGY | Facility: HOSPITAL | Age: 83
DRG: 207 | End: 2023-10-16
Payer: MEDICARE

## 2023-10-16 ENCOUNTER — APPOINTMENT (OUTPATIENT)
Dept: ULTRASOUND IMAGING | Facility: HOSPITAL | Age: 83
DRG: 207 | End: 2023-10-16
Payer: MEDICARE

## 2023-10-16 LAB
ANION GAP SERPL CALCULATED.3IONS-SCNC: 14 MMOL/L (ref 5–15)
ANISOCYTOSIS BLD QL: ABNORMAL
ARTERIAL PATENCY WRIST A: POSITIVE
ATMOSPHERIC PRESS: NORMAL MM[HG]
BASE EXCESS BLDA CALC-SCNC: 0.5 MMOL/L (ref 0–3)
BDY SITE: NORMAL
BUN SERPL-MCNC: 60 MG/DL (ref 8–23)
BUN/CREAT SERPL: 40.5 (ref 7–25)
CALCIUM SPEC-SCNC: 9.4 MG/DL (ref 8.6–10.5)
CHLORIDE SERPL-SCNC: 106 MMOL/L (ref 98–107)
CO2 BLDA-SCNC: 26.1 MMOL/L (ref 22–29)
CO2 SERPL-SCNC: 23 MMOL/L (ref 22–29)
CREAT SERPL-MCNC: 1.48 MG/DL (ref 0.57–1)
DEPRECATED RDW RBC AUTO: 52.9 FL (ref 37–54)
EGFRCR SERPLBLD CKD-EPI 2021: 35.2 ML/MIN/1.73
ERYTHROCYTE [DISTWIDTH] IN BLOOD BY AUTOMATED COUNT: 16 % (ref 12.3–15.4)
GLUCOSE BLDC GLUCOMTR-MCNC: 114 MG/DL (ref 74–100)
GLUCOSE BLDC GLUCOMTR-MCNC: 142 MG/DL (ref 70–105)
GLUCOSE BLDC GLUCOMTR-MCNC: 148 MG/DL (ref 70–105)
GLUCOSE SERPL-MCNC: 131 MG/DL (ref 65–99)
HCO3 BLDA-SCNC: 24.9 MMOL/L (ref 21–28)
HCT VFR BLD AUTO: 33.3 % (ref 34–46.6)
HEMODILUTION: NO
HGB BLD-MCNC: 10.3 G/DL (ref 12–15.9)
INHALED O2 CONCENTRATION: 90 %
LYMPHOCYTES # BLD MANUAL: 12.18 10*3/MM3 (ref 0.7–3.1)
LYMPHOCYTES NFR BLD MANUAL: 1 % (ref 5–12)
MCH RBC QN AUTO: 27.6 PG (ref 26.6–33)
MCHC RBC AUTO-ENTMCNC: 30.9 G/DL (ref 31.5–35.7)
MCV RBC AUTO: 89.4 FL (ref 79–97)
MODALITY: NORMAL
MONOCYTES # BLD: 0.17 10*3/MM3 (ref 0.1–0.9)
NEUTROPHILS # BLD AUTO: 5.05 10*3/MM3 (ref 1.7–7)
NEUTROPHILS NFR BLD MANUAL: 28 % (ref 42.7–76)
NEUTS BAND NFR BLD MANUAL: 1 % (ref 0–5)
PCO2 BLDA: 38.5 MM HG (ref 35–48)
PH BLDA: 7.42 PH UNITS (ref 7.35–7.45)
PLAT MORPH BLD: NORMAL
PLATELET # BLD AUTO: 131 10*3/MM3 (ref 140–450)
PMV BLD AUTO: 10.7 FL (ref 6–12)
PO2 BLDA: 86.5 MM HG (ref 83–108)
POIKILOCYTOSIS BLD QL SMEAR: ABNORMAL
POTASSIUM SERPL-SCNC: 4.2 MMOL/L (ref 3.5–5.2)
RBC # BLD AUTO: 3.72 10*6/MM3 (ref 3.77–5.28)
SAO2 % BLDCOA: 96.8 % (ref 94–98)
SCAN SLIDE: NORMAL
SODIUM SERPL-SCNC: 143 MMOL/L (ref 136–145)
VARIANT LYMPHS NFR BLD MANUAL: 70 % (ref 19.6–45.3)
VENTILATOR MODE: AC
WBC MORPH BLD: NORMAL
WBC NRBC COR # BLD: 17.4 10*3/MM3 (ref 3.4–10.8)

## 2023-10-16 PROCEDURE — 36600 WITHDRAWAL OF ARTERIAL BLOOD: CPT

## 2023-10-16 PROCEDURE — 71045 X-RAY EXAM CHEST 1 VIEW: CPT

## 2023-10-16 PROCEDURE — 25010000002 FUROSEMIDE PER 20 MG: Performed by: INTERNAL MEDICINE

## 2023-10-16 PROCEDURE — 82948 REAGENT STRIP/BLOOD GLUCOSE: CPT

## 2023-10-16 PROCEDURE — 80048 BASIC METABOLIC PNL TOTAL CA: CPT | Performed by: NURSE PRACTITIONER

## 2023-10-16 PROCEDURE — 94799 UNLISTED PULMONARY SVC/PX: CPT

## 2023-10-16 PROCEDURE — 82803 BLOOD GASES ANY COMBINATION: CPT

## 2023-10-16 PROCEDURE — 85025 COMPLETE CBC W/AUTO DIFF WBC: CPT | Performed by: NURSE PRACTITIONER

## 2023-10-16 PROCEDURE — 94003 VENT MGMT INPAT SUBQ DAY: CPT

## 2023-10-16 PROCEDURE — 76775 US EXAM ABDO BACK WALL LIM: CPT

## 2023-10-16 PROCEDURE — 25010000002 LORAZEPAM PER 2 MG: Performed by: INTERNAL MEDICINE

## 2023-10-16 PROCEDURE — 94664 DEMO&/EVAL PT USE INHALER: CPT

## 2023-10-16 PROCEDURE — 85007 BL SMEAR W/DIFF WBC COUNT: CPT | Performed by: NURSE PRACTITIONER

## 2023-10-16 PROCEDURE — 25010000002 METOCLOPRAMIDE PER 10 MG: Performed by: NURSE PRACTITIONER

## 2023-10-16 PROCEDURE — 25010000002 ENOXAPARIN PER 10 MG: Performed by: INTERNAL MEDICINE

## 2023-10-16 PROCEDURE — 25010000002 CEFTRIAXONE PER 250 MG: Performed by: INTERNAL MEDICINE

## 2023-10-16 PROCEDURE — 25010000002 MIDAZOLAM PER 1 MG: Performed by: NURSE PRACTITIONER

## 2023-10-16 PROCEDURE — 94761 N-INVAS EAR/PLS OXIMETRY MLT: CPT

## 2023-10-16 PROCEDURE — 25010000002 FENTANYL CITRATE (PF) 50 MCG/ML SOLUTION: Performed by: INTERNAL MEDICINE

## 2023-10-16 RX ORDER — FENTANYL CITRATE 50 UG/ML
150 INJECTION, SOLUTION INTRAMUSCULAR; INTRAVENOUS ONCE
Status: COMPLETED | OUTPATIENT
Start: 2023-10-16 | End: 2023-10-16

## 2023-10-16 RX ORDER — FUROSEMIDE 10 MG/ML
40 INJECTION INTRAMUSCULAR; INTRAVENOUS EVERY 6 HOURS
Status: COMPLETED | OUTPATIENT
Start: 2023-10-16 | End: 2023-10-16

## 2023-10-16 RX ORDER — FENTANYL CITRATE-0.9 % NACL/PF 10 MCG/ML
50-300 PLASTIC BAG, INJECTION (ML) INTRAVENOUS
Status: DISCONTINUED | OUTPATIENT
Start: 2023-10-16 | End: 2023-10-18

## 2023-10-16 RX ADMIN — DEXMEDETOMIDINE HYDROCHLORIDE 1.5 MCG/KG/HR: 4 INJECTION, SOLUTION INTRAVENOUS at 06:18

## 2023-10-16 RX ADMIN — CEFTRIAXONE 2000 MG: 2 INJECTION, POWDER, FOR SOLUTION INTRAMUSCULAR; INTRAVENOUS at 09:12

## 2023-10-16 RX ADMIN — ROSUVASTATIN 10 MG: 10 TABLET, FILM COATED ORAL at 20:26

## 2023-10-16 RX ADMIN — GUAIFENESIN 400 MG: 200 TABLET ORAL at 06:04

## 2023-10-16 RX ADMIN — ENOXAPARIN SODIUM 30 MG: 100 INJECTION SUBCUTANEOUS at 16:12

## 2023-10-16 RX ADMIN — METOCLOPRAMIDE 5 MG: 5 INJECTION, SOLUTION INTRAMUSCULAR; INTRAVENOUS at 13:57

## 2023-10-16 RX ADMIN — LANSOPRAZOLE 30 MG: 30 TABLET, ORALLY DISINTEGRATING ORAL at 06:04

## 2023-10-16 RX ADMIN — Medication 300 MG: at 09:13

## 2023-10-16 RX ADMIN — DEXMEDETOMIDINE HYDROCHLORIDE 1.3 MCG/KG/HR: 4 INJECTION, SOLUTION INTRAVENOUS at 16:38

## 2023-10-16 RX ADMIN — BUDESONIDE 0.5 MG: 0.5 INHALANT RESPIRATORY (INHALATION) at 07:22

## 2023-10-16 RX ADMIN — FUROSEMIDE 40 MG: 10 INJECTION, SOLUTION INTRAMUSCULAR; INTRAVENOUS at 13:57

## 2023-10-16 RX ADMIN — Medication 10 ML: at 09:13

## 2023-10-16 RX ADMIN — LORAZEPAM 1 MG: 2 INJECTION INTRAMUSCULAR; INTRAVENOUS at 19:36

## 2023-10-16 RX ADMIN — GUAIFENESIN 400 MG: 200 TABLET ORAL at 20:27

## 2023-10-16 RX ADMIN — SENNOSIDES AND DOCUSATE SODIUM 1 TABLET: 50; 8.6 TABLET ORAL at 09:13

## 2023-10-16 RX ADMIN — FUROSEMIDE 40 MG: 10 INJECTION, SOLUTION INTRAMUSCULAR; INTRAVENOUS at 20:26

## 2023-10-16 RX ADMIN — LEVOTHYROXINE SODIUM 100 MCG: 0.1 TABLET ORAL at 06:04

## 2023-10-16 RX ADMIN — Medication 10 ML: at 20:27

## 2023-10-16 RX ADMIN — Medication 10 ML: at 09:14

## 2023-10-16 RX ADMIN — FENTANYL CITRATE 150 MCG: 50 INJECTION, SOLUTION INTRAMUSCULAR; INTRAVENOUS at 10:14

## 2023-10-16 RX ADMIN — SERTRALINE 150 MG: 100 TABLET, FILM COATED ORAL at 09:13

## 2023-10-16 RX ADMIN — POLYETHYLENE GLYCOL 3350 17 G: 17 POWDER, FOR SOLUTION ORAL at 09:13

## 2023-10-16 RX ADMIN — METOCLOPRAMIDE 5 MG: 5 INJECTION, SOLUTION INTRAMUSCULAR; INTRAVENOUS at 20:27

## 2023-10-16 RX ADMIN — GUAIFENESIN 400 MG: 200 TABLET ORAL at 13:49

## 2023-10-16 RX ADMIN — MIDAZOLAM 2 MG: 1 INJECTION INTRAMUSCULAR; INTRAVENOUS at 02:09

## 2023-10-16 RX ADMIN — AMLODIPINE BESYLATE 5 MG: 5 TABLET ORAL at 09:13

## 2023-10-16 RX ADMIN — DEXMEDETOMIDINE HYDROCHLORIDE 1.5 MCG/KG/HR: 4 INJECTION, SOLUTION INTRAVENOUS at 09:28

## 2023-10-16 RX ADMIN — BUDESONIDE 0.5 MG: 0.5 INHALANT RESPIRATORY (INHALATION) at 19:54

## 2023-10-16 RX ADMIN — DEXMEDETOMIDINE HYDROCHLORIDE 1.3 MCG/KG/HR: 4 INJECTION, SOLUTION INTRAVENOUS at 13:49

## 2023-10-16 RX ADMIN — DEXMEDETOMIDINE HYDROCHLORIDE 1.3 MCG/KG/HR: 4 INJECTION, SOLUTION INTRAVENOUS at 22:18

## 2023-10-16 RX ADMIN — METOCLOPRAMIDE 5 MG: 5 INJECTION, SOLUTION INTRAMUSCULAR; INTRAVENOUS at 06:04

## 2023-10-16 RX ADMIN — SENNOSIDES AND DOCUSATE SODIUM 1 TABLET: 50; 8.6 TABLET ORAL at 20:26

## 2023-10-16 RX ADMIN — Medication 50 MCG/HR: at 10:08

## 2023-10-16 NOTE — PROGRESS NOTES
Critical Care Progress Note   Diane Guan : 1940 MRN:[id number] LOS:4     Principal Problem: Acute hypoxemic respiratory failure     Reason for follow up: All the medical problems listed below    Summary     The following information has been supplemented by review of documentation, collaboration with other providers, and interview with the patient's daughter at bedside as the patient is confused at the time of examination.  The patient presented to the emergency department on 10/10/2023 for evaluation of right chest wall pain following a fall at home.  The patient's daughter reports that the patient lost her balance and fell when she got up in the dark to go to the restroom.  She apparently had preached for the light switch at which time stumbled and fell with her right side hitting the elevated toilet seat and then fell to her right side striking the floor.  There was no report of LOC and following the fall the patient was not confused.  The patient's daughter also reports that the patient had complained of a cough productive of clear secretions approximately 3 weeks ago.  On  the patient was evaluated at an urgent care facility where she was prescribed a Z-Erick however had no improvement.     On evaluation in the emergency department the patient was found to have hypoxic/hypercapnic respiratory failure which responded well to O2 therapy.  Her home dose diuretics were continued.  She was admitted for further evaluation and treatment to the hospitalist.  The patient's daughter states that at approximately 5 PM yesterday evening the patient developed worsening confusion and hallucinations.  Overnight the patient developed worsening O2 needs and hypercapnia requiring increased supplementation to BiPAP therapy.    ACP: No ACP documentation on file.  Patient is currently confused however her adult daughter is at bedside and the patient indicates that decisions can be made by her.     Significant events      10/16/23 : Pt required intubation yesterday 2/2 respiratory failure.  Requiring 90% FiO2 today.  Sputum Cx sent and pending.  Pt very agitated on max dose precedex; had to add fentanyl gtt.  Had 1.5 L UOP last 24h, net -1.6 L.  Cr stable.  WBC essentially stable at 17.  Hb stable. CXR not that impressive, with only faint left basilar infiltrate. Ucx growing >100k GNR's.    Assessment / Plan     Acute respiratory failure with hypoxia and hypercapnia  Multifactorial with AE COPD, volume overload with small left pleural effusion, splinting due to pain with inspiration   Sepsis  Acute cystitis w/o hematuria  Likely due to fluid volume overload and mild/acute exacerbation COPD  BiPAP settings noted and adjusted as needed.  Airvo as tolerated  Close monitoring of ABG as clinically indicated.   Avoid sedating medications  Scheduled Symbicort, as needed DuoNebs  Procalcitonin normal  Echocardiogram 1/6/2021, EF 65%  Repeat echocardiogram pending  Continue chlorthalidone per home dose.  Further diuresis per nephrology  Diuresis per nephrology  Encourage I-S and flutter while awake  Pt intubated and on mechanical ventilation as of 10/15  Precedex and fentanyl for vent comfort.  Wean FiO2 as tolerates.  On Rocephin       Diabetes melitis type II  Hold home dose metformin due to LEXUS  Accu-Cheks before meals and at bedtime with SSI for tight glycemic control  Hemoglobin A1c pending       Acute metabolic encephalopathy  Likely metabolic secondary to hypercapnia and hypoxia  Patient has not returned to their baseline at this time.    Urinalysis with reflex culture pending  TSH/free T4 pending  CT head pending       Essential hypertension  Continue home dose Norvasc  Titrate meds as needed       Acute kidney injury on CKD stage III, baseline creatinine 1.3  Acute urinary retention  Hyperkalemia  Remains non oliguric.   Likely prerenal. Possible intrinsic component due to ATN from infection, drugs and hypotension.   C/w IV  hydration.   Nephrology consulted  Renal ultrasound pending  Monitor Input/Output very closely.   Received hyper-K treatment this morning.  Lasix x1 per nephrology now  Place Bellamy catheter for now to relieve urinary retention and close I/O monitoring  Net IO Since Admission: 1,160 mL [10/14/23 1241]         Diabetes mellitus type 2  Hold home medications due to LEXUS  SSI for tight glycemic control  Hemoglobin A1c       Constipation with abdominal discomfort  KUB negative for obstructive pattern, moderate stool burden  Schedule bowel regimen  Dulcolax suppository x1 now, add enema if nonproductive  Continue Reglan, EKG in a.m.     Chronic and stable  Hypothyroidism--continue levothyroxine.  TSH/free T4 pending  Hyperlipidemia--continue statin therapy  Breast cancer, s/p right mastectomy.  Anastrozole since 2019--continue  CLL, leukocytosis at baseline  Mycobacterium AVM complex, s/p antibiotic regimen  Falls at home/functional decline      Critical Care Time:  32 mins.      Code status:   Level Of Support Discussed With: Patient  Code Status (Patient has no pulse and is not breathing): CPR (Attempt to Resuscitate)  Medical Interventions (Patient has pulse or is breathing): Full Support       Nutrition: NPO Diet NPO Type: Strict NPO   Patient isn't on Tube Feeding    DVT prophylaxis:  Medical and mechanical DVT prophylaxis orders are present.     Subjective / Review of systems     Review of Systems   Unable to be obtained 2/2 pt's current condition.  Objective / Physical Exam   Vital signs:  Temp: 98.8 °F (37.1 °C)  BP: 156/54  Heart Rate: 63  Resp: 20  SpO2: 94 %  Weight: 80.3 kg (177 lb)    Admission Weight: Weight: 78.5 kg (173 lb)  Current Weight: Weight: 80.3 kg (177 lb)    Input/Output in last 24 hours:    Intake/Output Summary (Last 24 hours) at 10/16/2023 1148  Last data filed at 10/16/2023 0618  Gross per 24 hour   Intake 933 ml   Output 1525 ml   Net -592 ml      Physical Exam     GEN:  Elderly woman,  intubated, partially sedated, agitated, on vent.  NEURO:  Brainstem reflexes intact.  No obvious focal deficit.  Moves all 4 ext.  HEENT:  N/AT.  PERRL.  MMM.  Oropharynx non-erythematous.  No drainage from the eyes/ears/nose.  No conjunctival petechiae.  No oral thrush. ETT in place.  NECK:  Supple, NT, trachea midline.  No meningismus.    CHEST/LUNGS:  Breath sounds are diminished with coarse rhonchi bilaterally.  Chest excursion equal bilaterally.    CARDIOVASCULAR:  RRR w/o murmur noted.  GI:  Abdomen soft, NT, ND, +BS.   :  External urinary catheter in place.  EXTREMITIES:  No deformity or amputation.  No cyanosis, trace edema, no asymmetry.  Pulses 2+ and equal in BLE's.    SKIN:  Warm, dry, and pink.  No rash, breakdown, or track marks noted.  LYMPHATICS/HEME:  No overt LAD or abnormal bruising.  No lymphedema.  MSK:  Normal ROM.  No joint abnormalities noted.  Strength is 5/5 and equal in BUE and BLE's.  PSYCH:  Unable to be obtained 2/2 pt's current condition.          Radiology and Labs     Results from last 7 days   Lab Units 10/16/23  0429 10/15/23  0616 10/14/23  0753 10/13/23  2356 10/11/23  1454 10/10/23  1510   WBC 10*3/mm3 17.40* 16.10*  --  28.80* 26.30* 30.60*   HEMATOCRIT % 33.3* 32.8*  --  37.6 34.4 38.4   HEMATOCRIT POC %  --   --  37*  --   --   --    PLATELETS 10*3/mm3 131* 131*  --  148 158 183      Results from last 7 days   Lab Units 10/16/23  0429 10/15/23  0616 10/14/23  1620 10/14/23  0425 10/13/23  2356 10/13/23  2040 10/13/23  0936   SODIUM mmol/L 143 142  --  139 136  --  138   POTASSIUM mmol/L 4.2 5.0 5.2 6.0* 5.6*   < > 5.7*   CHLORIDE mmol/L 106 107  --  104 105  --  105   CO2 mmol/L 23.0 25.0  --  22.0 18.0*  --  23.0   BUN mg/dL 60* 52*  --  49* 47*  --  45*   CREATININE mg/dL 1.48* 1.43*  --  1.55* 1.46*  --  1.52*    < > = values in this interval not displayed.      Current medications   Scheduled Meds: amLODIPine, 5 mg, Nasogastric, Daily  budesonide, 0.5 mg, Nebulization,  Q12H  cefTRIAXone, 2,000 mg, Intravenous, Q24H  enoxaparin, 30 mg, Subcutaneous, Daily  ferrous sulfate, 300 mg, Nasogastric, Once per day on Mon Wed Fri  guaiFENesin, 400 mg, Nasogastric, Q8H  insulin lispro, 2-7 Units, Subcutaneous, Q6H  lansoprazole, 30 mg, Nasogastric, Q AM  levothyroxine, 100 mcg, Nasogastric, Q AM  metoclopramide, 5 mg, Intravenous, Q8H  polyethylene glycol, 17 g, Nasogastric, Daily  rosuvastatin, 10 mg, Nasogastric, Nightly  senna-docusate sodium, 1 tablet, Nasogastric, BID  sertraline, 150 mg, Oral, Daily  sodium chloride, 10 mL, Intravenous, Q12H  sodium chloride, 10 mL, Intravenous, Q12H      Continuous Infusions: dexmedetomidine, 0.2-1.5 mcg/kg/hr, Last Rate: 1.5 mcg/kg/hr (10/16/23 0696)  fentanyl 10 mcg/mL,  mcg/hr, Last Rate: 50 mcg/hr (10/16/23 1007)        Plan discussed with RN. Reviewed all other data in the last 24 hours, including but not limited to vitals, labs, microbiology, imaging and pertinent notes from other providers.     Vinicius Heredia, DO   Critical Care  10/16/23   11:48 EDT

## 2023-10-16 NOTE — PLAN OF CARE
Goal Outcome Evaluation:    Her sister and daughter have been at bedside today. She was very restless this morning when I came in. Dr. Heredia ordered a fentanyl gtt which helped her calm down. Vitals have been WNL.

## 2023-10-16 NOTE — SIGNIFICANT NOTE
10/16/23 1515   OTHER   Discipline occupational therapist   Rehab Time/Intention   Session Not Performed other (see comments)  (Intubated, sedated, restrained. Will f/u when appropriate)   Recommendation   OT - Next Appointment 10/17/23

## 2023-10-16 NOTE — SIGNIFICANT NOTE
10/16/23 0911   OTHER   Discipline physical therapist   Rehab Time/Intention   Session Not Performed other (see comments)  (Intubated/sedated and in restraints. PT to f/u as appropriate.)   Recommendation   PT - Next Appointment 10/17/23

## 2023-10-16 NOTE — PROGRESS NOTES
"RENAL/KCC:     LOS: 4 days    Patient Care Team:  Asia Queen APRN as PCP - General (Nurse Practitioner)  Asia Queen APRN as Nurse Practitioner (Nurse Practitioner)  Ling Bolden MD as Consulting Physician (Hematology and Oncology)    Chief Complaint:  Acute resp failure    Subjective     Interval History:   Chart reviewed  Events noted  Intubated  On 90% FiO2    Objective     Vital Sign Min/Max for last 24 hours  Temp  Min: 98.3 °F (36.8 °C)  Max: 99.5 °F (37.5 °C)   BP  Min: 91/44  Max: 160/72   Pulse  Min: 63  Max: 96   Resp  Min: 20  Max: 20   SpO2  Min: 90 %  Max: 98 %   No data recorded   No data recorded     Flowsheet Rows      Flowsheet Row First Filed Value   Admission Height 162.6 cm (64\") Documented at 10/10/2023 1252   Admission Weight 78.5 kg (173 lb) Documented at 10/10/2023 1252            No intake/output data recorded.  I/O last 3 completed shifts:  In: 933 [I.V.:933]  Out: 1725 [Urine:1475; Emesis/NG output:250]    Physical Exam:  GEN: Intubated, sedated  ENT: +ETT  NECK: Supple, no JVD  CHEST: Coarse  CV: RRR, no M/G/R  ABD: Soft, NT, +BS  SKIN: Warm and Dry  NEURO: Sedated      WBC WBC   Date Value Ref Range Status   10/16/2023 17.40 (H) 3.40 - 10.80 10*3/mm3 Final   10/15/2023 16.10 (H) 3.40 - 10.80 10*3/mm3 Final   10/13/2023 28.80 (H) 3.40 - 10.80 10*3/mm3 Final      HGB Hemoglobin   Date Value Ref Range Status   10/16/2023 10.3 (L) 12.0 - 15.9 g/dL Final   10/15/2023 9.9 (L) 12.0 - 15.9 g/dL Final   10/14/2023 12.5 12.0 - 17.0 g/dL Final   10/13/2023 11.1 (L) 12.0 - 15.9 g/dL Final      HCT Hematocrit   Date Value Ref Range Status   10/16/2023 33.3 (L) 34.0 - 46.6 % Final   10/15/2023 32.8 (L) 34.0 - 46.6 % Final   10/14/2023 37 (L) 38 - 51 % Final   10/13/2023 37.6 34.0 - 46.6 % Final      Platlets No results found for: \"LABPLAT\"   MCV MCV   Date Value Ref Range Status   10/16/2023 89.4 79.0 - 97.0 fL Final   10/15/2023 90.1 79.0 - 97.0 fL Final   10/13/2023 91.4 79.0 " - 97.0 fL Final          Sodium Sodium   Date Value Ref Range Status   10/16/2023 143 136 - 145 mmol/L Final   10/15/2023 142 136 - 145 mmol/L Final   10/14/2023 139 136 - 145 mmol/L Final   10/13/2023 136 136 - 145 mmol/L Final      Potassium Potassium   Date Value Ref Range Status   10/16/2023 4.2 3.5 - 5.2 mmol/L Final   10/15/2023 5.0 3.5 - 5.2 mmol/L Final     Comment:     Slight hemolysis detected by analyzer. Results may be affected.   10/14/2023 5.2 3.5 - 5.2 mmol/L Final     Comment:     Slight hemolysis detected by analyzer. Results may be affected.   10/14/2023 6.0 (H) 3.5 - 5.2 mmol/L Final   10/13/2023 5.6 (H) 3.5 - 5.2 mmol/L Final     Comment:     Slight hemolysis detected by analyzer. Results may be affected.   10/13/2023 6.3 (C) 3.5 - 5.2 mmol/L Final     Comment:     Slight hemolysis detected by analyzer. Results may be affected.      Chloride Chloride   Date Value Ref Range Status   10/16/2023 106 98 - 107 mmol/L Final   10/15/2023 107 98 - 107 mmol/L Final   10/14/2023 104 98 - 107 mmol/L Final   10/13/2023 105 98 - 107 mmol/L Final      CO2 CO2   Date Value Ref Range Status   10/16/2023 23.0 22.0 - 29.0 mmol/L Final   10/15/2023 25.0 22.0 - 29.0 mmol/L Final   10/14/2023 22.0 22.0 - 29.0 mmol/L Final   10/13/2023 18.0 (L) 22.0 - 29.0 mmol/L Final      BUN BUN   Date Value Ref Range Status   10/16/2023 60 (H) 8 - 23 mg/dL Final   10/15/2023 52 (H) 8 - 23 mg/dL Final   10/14/2023 49 (H) 8 - 23 mg/dL Final   10/13/2023 47 (H) 8 - 23 mg/dL Final      Creatinine Creatinine   Date Value Ref Range Status   10/16/2023 1.48 (H) 0.57 - 1.00 mg/dL Final   10/15/2023 1.43 (H) 0.57 - 1.00 mg/dL Final   10/14/2023 1.55 (H) 0.57 - 1.00 mg/dL Final   10/13/2023 1.46 (H) 0.57 - 1.00 mg/dL Final      Calcium Calcium   Date Value Ref Range Status   10/16/2023 9.4 8.6 - 10.5 mg/dL Final   10/15/2023 9.3 8.6 - 10.5 mg/dL Final   10/14/2023 9.3 8.6 - 10.5 mg/dL Final   10/13/2023 9.3 8.6 - 10.5 mg/dL Final      PO4  "No results found for: \"CAPO4\"   Albumin No results found for: \"ALBUMIN\"   Magnesium Magnesium   Date Value Ref Range Status   10/15/2023 1.9 1.6 - 2.4 mg/dL Final      Uric Acid No results found for: \"URICACID\"        Results Review:     I reviewed the patient's new clinical results.    amLODIPine, 5 mg, Nasogastric, Daily  budesonide, 0.5 mg, Nebulization, Q12H  cefTRIAXone, 2,000 mg, Intravenous, Q24H  enoxaparin, 30 mg, Subcutaneous, Daily  ferrous sulfate, 300 mg, Nasogastric, Once per day on Mon Wed Fri  furosemide, 40 mg, Intravenous, Q6H  guaiFENesin, 400 mg, Nasogastric, Q8H  insulin lispro, 2-7 Units, Subcutaneous, Q6H  lansoprazole, 30 mg, Nasogastric, Q AM  levothyroxine, 100 mcg, Nasogastric, Q AM  metoclopramide, 5 mg, Intravenous, Q8H  polyethylene glycol, 17 g, Nasogastric, Daily  rosuvastatin, 10 mg, Nasogastric, Nightly  senna-docusate sodium, 1 tablet, Nasogastric, BID  sertraline, 150 mg, Oral, Daily  sodium chloride, 10 mL, Intravenous, Q12H  sodium chloride, 10 mL, Intravenous, Q12H      dexmedetomidine, 0.2-1.5 mcg/kg/hr, Last Rate: 1.5 mcg/kg/hr (10/16/23 0928)  fentanyl 10 mcg/mL,  mcg/hr, Last Rate: 50 mcg/hr (10/16/23 1008)        Medication Review: Reviewed    Assessment & Plan     LEXUS/CKD3  Hyperkalemia    Acute hypoxemic respiratory failure    Mixed anxiety and depressive disorder    Primary osteoarthritis involving multiple joints    Hiatal hernia with gastroesophageal reflux    Mixed hyperlipidemia    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    Acquired hypothyroidism    Type 2 diabetes mellitus with stage 3b chronic kidney disease, without long-term current use of insulin    Insomnia    Overactive bladder    Vitamin B 12 deficiency    Vitamin D deficiency    LIBBY (obstructive sleep apnea)    COPD (chronic obstructive pulmonary disease)    CKD (chronic kidney disease) stage 3, GFR 30-59 ml/min    History of breast cancer    Personal history of CLL (chronic lymphocytic " leukemia)    History of cigarette smoking    Fall at home    Rhinovirus infection    Acute respiratory failure    Hyperkalemia    Respiratory failure      Plan: Cr stable at 1.4 and hyperkalemia resolved.  FC in place for urinary retention.  Redose Lasix today.  Vent per Pulm.  Will follow.      Frederick Bonds MD  [facility]  10/16/23  13:32 EDT

## 2023-10-16 NOTE — NURSING NOTE
Patient maintained on vent overnight at 90% and 5 peep. Dex running at 1.5 mcg/kg/hr, and had occasional prn medication for sedation. OG was inserted at 2000 and confirmed placement with KUB. Several meds were given through the night to help stimulate the patient's bowels to move. Patient remains in restraints to prevent pulling of lines and tubes. Patient's sister remains at bedside. VSS. Will continue to monitor.

## 2023-10-17 ENCOUNTER — APPOINTMENT (OUTPATIENT)
Dept: GENERAL RADIOLOGY | Facility: HOSPITAL | Age: 83
DRG: 207 | End: 2023-10-17
Payer: MEDICARE

## 2023-10-17 LAB
ALBUMIN SERPL-MCNC: 3.3 G/DL (ref 3.5–5.2)
ALBUMIN/GLOB SERPL: 1 G/DL
ALP SERPL-CCNC: 110 U/L (ref 39–117)
ALT SERPL W P-5'-P-CCNC: 9 U/L (ref 1–33)
ANION GAP SERPL CALCULATED.3IONS-SCNC: 16 MMOL/L (ref 5–15)
ANISOCYTOSIS BLD QL: ABNORMAL
ARTERIAL PATENCY WRIST A: POSITIVE
AST SERPL-CCNC: 11 U/L (ref 1–32)
ATMOSPHERIC PRESS: ABNORMAL MM[HG]
BACTERIA SPEC AEROBE CULT: ABNORMAL
BACTERIA SPEC AEROBE CULT: ABNORMAL
BASE EXCESS BLDA CALC-SCNC: 2 MMOL/L (ref 0–3)
BDY SITE: ABNORMAL
BILIRUB SERPL-MCNC: 0.3 MG/DL (ref 0–1.2)
BUN SERPL-MCNC: 65 MG/DL (ref 8–23)
BUN/CREAT SERPL: 42.2 (ref 7–25)
CA-I SERPL ISE-MCNC: 1.18 MMOL/L (ref 1.2–1.3)
CALCIUM SPEC-SCNC: 9.3 MG/DL (ref 8.6–10.5)
CHLORIDE SERPL-SCNC: 103 MMOL/L (ref 98–107)
CO2 BLDA-SCNC: 27 MMOL/L (ref 22–29)
CO2 SERPL-SCNC: 24 MMOL/L (ref 22–29)
CREAT SERPL-MCNC: 1.54 MG/DL (ref 0.57–1)
DEPRECATED RDW RBC AUTO: 52.9 FL (ref 37–54)
EGFRCR SERPLBLD CKD-EPI 2021: 33.6 ML/MIN/1.73
ERYTHROCYTE [DISTWIDTH] IN BLOOD BY AUTOMATED COUNT: 15.8 % (ref 12.3–15.4)
GLOBULIN UR ELPH-MCNC: 3.2 GM/DL
GLUCOSE BLDC GLUCOMTR-MCNC: 114 MG/DL (ref 70–105)
GLUCOSE BLDC GLUCOMTR-MCNC: 124 MG/DL (ref 70–105)
GLUCOSE BLDC GLUCOMTR-MCNC: 133 MG/DL (ref 70–105)
GLUCOSE BLDC GLUCOMTR-MCNC: 183 MG/DL (ref 74–100)
GLUCOSE SERPL-MCNC: 183 MG/DL (ref 65–99)
HCO3 BLDA-SCNC: 25.8 MMOL/L (ref 21–28)
HCT VFR BLD AUTO: 33.8 % (ref 34–46.6)
HEMODILUTION: NO
HGB BLD-MCNC: 10.3 G/DL (ref 12–15.9)
INHALED O2 CONCENTRATION: 90 %
LYMPHOCYTES # BLD MANUAL: 7.78 10*3/MM3 (ref 0.7–3.1)
LYMPHOCYTES NFR BLD MANUAL: 3 % (ref 5–12)
MAGNESIUM SERPL-MCNC: 1.7 MG/DL (ref 1.6–2.4)
MCH RBC QN AUTO: 27.3 PG (ref 26.6–33)
MCHC RBC AUTO-ENTMCNC: 30.5 G/DL (ref 31.5–35.7)
MCV RBC AUTO: 89.4 FL (ref 79–97)
MODALITY: ABNORMAL
MONOCYTES # BLD: 0.43 10*3/MM3 (ref 0.1–0.9)
NEUTROPHILS # BLD AUTO: 6.19 10*3/MM3 (ref 1.7–7)
NEUTROPHILS NFR BLD MANUAL: 43 % (ref 42.7–76)
PCO2 BLDA: 36.6 MM HG (ref 35–48)
PH BLDA: 7.46 PH UNITS (ref 7.35–7.45)
PHOSPHATE SERPL-MCNC: 3.9 MG/DL (ref 2.5–4.5)
PLATELET # BLD AUTO: 122 10*3/MM3 (ref 140–450)
PMV BLD AUTO: 10.4 FL (ref 6–12)
PO2 BLDA: 72.3 MM HG (ref 83–108)
POTASSIUM SERPL-SCNC: 3.7 MMOL/L (ref 3.5–5.2)
PROT SERPL-MCNC: 6.5 G/DL (ref 6–8.5)
RBC # BLD AUTO: 3.78 10*6/MM3 (ref 3.77–5.28)
SAO2 % BLDCOA: 95.2 % (ref 94–98)
SCAN SLIDE: NORMAL
SMALL PLATELETS BLD QL SMEAR: ABNORMAL
SODIUM SERPL-SCNC: 143 MMOL/L (ref 136–145)
VARIANT LYMPHS NFR BLD MANUAL: 2 % (ref 0–5)
VARIANT LYMPHS NFR BLD MANUAL: 52 % (ref 19.6–45.3)
VENTILATOR MODE: AC
WBC MORPH BLD: NORMAL
WBC NRBC COR # BLD: 14.4 10*3/MM3 (ref 3.4–10.8)

## 2023-10-17 PROCEDURE — 94799 UNLISTED PULMONARY SVC/PX: CPT

## 2023-10-17 PROCEDURE — 25010000002 PROPOFOL 10 MG/ML EMULSION

## 2023-10-17 PROCEDURE — 71045 X-RAY EXAM CHEST 1 VIEW: CPT

## 2023-10-17 PROCEDURE — 82948 REAGENT STRIP/BLOOD GLUCOSE: CPT

## 2023-10-17 PROCEDURE — 25810000003 SODIUM CHLORIDE 0.9 % SOLUTION: Performed by: STUDENT IN AN ORGANIZED HEALTH CARE EDUCATION/TRAINING PROGRAM

## 2023-10-17 PROCEDURE — 36600 WITHDRAWAL OF ARTERIAL BLOOD: CPT

## 2023-10-17 PROCEDURE — 25810000003 LACTATED RINGERS SOLUTION

## 2023-10-17 PROCEDURE — 25010000002 LORAZEPAM PER 2 MG: Performed by: INTERNAL MEDICINE

## 2023-10-17 PROCEDURE — 25010000002 HYDRALAZINE PER 20 MG: Performed by: INTERNAL MEDICINE

## 2023-10-17 PROCEDURE — 25010000002 MAGNESIUM SULFATE 2 GM/50ML SOLUTION: Performed by: INTERNAL MEDICINE

## 2023-10-17 PROCEDURE — 85007 BL SMEAR W/DIFF WBC COUNT: CPT | Performed by: NURSE PRACTITIONER

## 2023-10-17 PROCEDURE — 25010000002 MIDAZOLAM PER 1 MG: Performed by: NURSE PRACTITIONER

## 2023-10-17 PROCEDURE — 84100 ASSAY OF PHOSPHORUS: CPT | Performed by: STUDENT IN AN ORGANIZED HEALTH CARE EDUCATION/TRAINING PROGRAM

## 2023-10-17 PROCEDURE — 80053 COMPREHEN METABOLIC PANEL: CPT | Performed by: STUDENT IN AN ORGANIZED HEALTH CARE EDUCATION/TRAINING PROGRAM

## 2023-10-17 PROCEDURE — 25010000002 METOCLOPRAMIDE PER 10 MG: Performed by: NURSE PRACTITIONER

## 2023-10-17 PROCEDURE — 82330 ASSAY OF CALCIUM: CPT | Performed by: STUDENT IN AN ORGANIZED HEALTH CARE EDUCATION/TRAINING PROGRAM

## 2023-10-17 PROCEDURE — 94664 DEMO&/EVAL PT USE INHALER: CPT

## 2023-10-17 PROCEDURE — 25010000002 FUROSEMIDE PER 20 MG: Performed by: INTERNAL MEDICINE

## 2023-10-17 PROCEDURE — 82803 BLOOD GASES ANY COMBINATION: CPT

## 2023-10-17 PROCEDURE — 25010000002 CALCIUM GLUCONATE-NACL 1-0.675 GM/50ML-% SOLUTION: Performed by: INTERNAL MEDICINE

## 2023-10-17 PROCEDURE — 83735 ASSAY OF MAGNESIUM: CPT | Performed by: STUDENT IN AN ORGANIZED HEALTH CARE EDUCATION/TRAINING PROGRAM

## 2023-10-17 PROCEDURE — 63710000001 INSULIN LISPRO (HUMAN) PER 5 UNITS: Performed by: NURSE PRACTITIONER

## 2023-10-17 PROCEDURE — 85025 COMPLETE CBC W/AUTO DIFF WBC: CPT | Performed by: NURSE PRACTITIONER

## 2023-10-17 PROCEDURE — 94003 VENT MGMT INPAT SUBQ DAY: CPT

## 2023-10-17 PROCEDURE — 25010000002 ENOXAPARIN PER 10 MG: Performed by: INTERNAL MEDICINE

## 2023-10-17 PROCEDURE — 94669 MECHANICAL CHEST WALL OSCILL: CPT

## 2023-10-17 PROCEDURE — 25010000002 CEFTRIAXONE PER 250 MG: Performed by: INTERNAL MEDICINE

## 2023-10-17 RX ORDER — CALCIUM GLUCONATE 20 MG/ML
1000 INJECTION, SOLUTION INTRAVENOUS ONCE
Status: COMPLETED | OUTPATIENT
Start: 2023-10-17 | End: 2023-10-17

## 2023-10-17 RX ORDER — IPRATROPIUM BROMIDE AND ALBUTEROL SULFATE 2.5; .5 MG/3ML; MG/3ML
3 SOLUTION RESPIRATORY (INHALATION)
Status: COMPLETED | OUTPATIENT
Start: 2023-10-17 | End: 2023-10-20

## 2023-10-17 RX ORDER — FUROSEMIDE 10 MG/ML
60 INJECTION INTRAMUSCULAR; INTRAVENOUS DAILY
Status: DISCONTINUED | OUTPATIENT
Start: 2023-10-17 | End: 2023-10-18

## 2023-10-17 RX ORDER — PROPOFOL 10 MG/ML
VIAL (ML) INTRAVENOUS
Status: COMPLETED
Start: 2023-10-17 | End: 2023-10-17

## 2023-10-17 RX ORDER — MIDODRINE HYDROCHLORIDE 5 MG/1
10 TABLET ORAL ONCE
Status: COMPLETED | OUTPATIENT
Start: 2023-10-17 | End: 2023-10-17

## 2023-10-17 RX ORDER — MAGNESIUM SULFATE HEPTAHYDRATE 40 MG/ML
2 INJECTION, SOLUTION INTRAVENOUS ONCE
Status: COMPLETED | OUTPATIENT
Start: 2023-10-17 | End: 2023-10-17

## 2023-10-17 RX ORDER — POLYETHYLENE GLYCOL 3350 17 G/17G
17 POWDER, FOR SOLUTION ORAL 2 TIMES DAILY
Status: DISCONTINUED | OUTPATIENT
Start: 2023-10-17 | End: 2023-10-20

## 2023-10-17 RX ORDER — ACETYLCYSTEINE 200 MG/ML
3 SOLUTION ORAL; RESPIRATORY (INHALATION) 3 TIMES DAILY
Status: COMPLETED | OUTPATIENT
Start: 2023-10-17 | End: 2023-10-19

## 2023-10-17 RX ORDER — POTASSIUM CHLORIDE 1.5 G/1.58G
20 POWDER, FOR SOLUTION ORAL ONCE
Status: COMPLETED | OUTPATIENT
Start: 2023-10-17 | End: 2023-10-17

## 2023-10-17 RX ORDER — HYDRALAZINE HYDROCHLORIDE 20 MG/ML
10 INJECTION INTRAMUSCULAR; INTRAVENOUS EVERY 6 HOURS PRN
Status: DISCONTINUED | OUTPATIENT
Start: 2023-10-17 | End: 2023-11-06 | Stop reason: HOSPADM

## 2023-10-17 RX ORDER — LACTULOSE 10 G/15ML
30 SOLUTION ORAL ONCE
Status: COMPLETED | OUTPATIENT
Start: 2023-10-17 | End: 2023-10-17

## 2023-10-17 RX ADMIN — ACETYLCYSTEINE 3 ML: 200 SOLUTION ORAL; RESPIRATORY (INHALATION) at 11:45

## 2023-10-17 RX ADMIN — Medication 250 MCG/HR: at 23:39

## 2023-10-17 RX ADMIN — LACTULOSE 30 G: 20 SOLUTION ORAL at 03:21

## 2023-10-17 RX ADMIN — PROPOFOL INJECTABLE EMULSION 10 MCG/KG/MIN: 10 INJECTION, EMULSION INTRAVENOUS at 16:25

## 2023-10-17 RX ADMIN — METOCLOPRAMIDE 5 MG: 5 INJECTION, SOLUTION INTRAMUSCULAR; INTRAVENOUS at 13:47

## 2023-10-17 RX ADMIN — INSULIN LISPRO 2 UNITS: 100 INJECTION, SOLUTION INTRAVENOUS; SUBCUTANEOUS at 06:02

## 2023-10-17 RX ADMIN — ENOXAPARIN SODIUM 30 MG: 100 INJECTION SUBCUTANEOUS at 15:12

## 2023-10-17 RX ADMIN — SENNOSIDES AND DOCUSATE SODIUM 1 TABLET: 50; 8.6 TABLET ORAL at 21:41

## 2023-10-17 RX ADMIN — LORAZEPAM 1 MG: 2 INJECTION INTRAMUSCULAR; INTRAVENOUS at 01:49

## 2023-10-17 RX ADMIN — BUDESONIDE 0.5 MG: 0.5 INHALANT RESPIRATORY (INHALATION) at 06:50

## 2023-10-17 RX ADMIN — SODIUM CHLORIDE, POTASSIUM CHLORIDE, SODIUM LACTATE AND CALCIUM CHLORIDE 1000 ML: 600; 310; 30; 20 INJECTION, SOLUTION INTRAVENOUS at 17:15

## 2023-10-17 RX ADMIN — CALCIUM GLUCONATE 1000 MG: 20 INJECTION, SOLUTION INTRAVENOUS at 08:43

## 2023-10-17 RX ADMIN — METOCLOPRAMIDE 5 MG: 5 INJECTION, SOLUTION INTRAMUSCULAR; INTRAVENOUS at 21:41

## 2023-10-17 RX ADMIN — ROSUVASTATIN 10 MG: 10 TABLET, FILM COATED ORAL at 21:41

## 2023-10-17 RX ADMIN — MAGNESIUM SULFATE HEPTAHYDRATE 2 G: 2 INJECTION, SOLUTION INTRAVENOUS at 09:50

## 2023-10-17 RX ADMIN — SENNOSIDES AND DOCUSATE SODIUM 1 TABLET: 50; 8.6 TABLET ORAL at 08:39

## 2023-10-17 RX ADMIN — BUDESONIDE 0.5 MG: 0.5 INHALANT RESPIRATORY (INHALATION) at 19:33

## 2023-10-17 RX ADMIN — FUROSEMIDE 60 MG: 10 INJECTION, SOLUTION INTRAMUSCULAR; INTRAVENOUS at 09:45

## 2023-10-17 RX ADMIN — POTASSIUM CHLORIDE 20 MEQ: 1.5 POWDER, FOR SOLUTION ORAL at 08:43

## 2023-10-17 RX ADMIN — SODIUM CHLORIDE, POTASSIUM CHLORIDE, SODIUM LACTATE AND CALCIUM CHLORIDE 1000 ML: 600; 310; 30; 20 INJECTION, SOLUTION INTRAVENOUS at 18:41

## 2023-10-17 RX ADMIN — MIDAZOLAM 2 MG: 1 INJECTION INTRAMUSCULAR; INTRAVENOUS at 13:20

## 2023-10-17 RX ADMIN — IPRATROPIUM BROMIDE AND ALBUTEROL SULFATE 3 ML: .5; 3 SOLUTION RESPIRATORY (INHALATION) at 11:45

## 2023-10-17 RX ADMIN — Medication 10 ML: at 08:39

## 2023-10-17 RX ADMIN — MIDAZOLAM 2 MG: 1 INJECTION INTRAMUSCULAR; INTRAVENOUS at 15:18

## 2023-10-17 RX ADMIN — LEVOTHYROXINE SODIUM 100 MCG: 0.1 TABLET ORAL at 06:03

## 2023-10-17 RX ADMIN — SERTRALINE 150 MG: 100 TABLET, FILM COATED ORAL at 08:39

## 2023-10-17 RX ADMIN — GUAIFENESIN 400 MG: 200 TABLET ORAL at 06:03

## 2023-10-17 RX ADMIN — CEFTRIAXONE 2000 MG: 2 INJECTION, POWDER, FOR SOLUTION INTRAMUSCULAR; INTRAVENOUS at 09:45

## 2023-10-17 RX ADMIN — GUAIFENESIN 400 MG: 200 TABLET ORAL at 21:41

## 2023-10-17 RX ADMIN — POLYETHYLENE GLYCOL 3350 17 G: 17 POWDER, FOR SOLUTION ORAL at 03:21

## 2023-10-17 RX ADMIN — LANSOPRAZOLE 30 MG: 30 TABLET, ORALLY DISINTEGRATING ORAL at 06:03

## 2023-10-17 RX ADMIN — Medication 150 MCG/HR: at 10:01

## 2023-10-17 RX ADMIN — MIDODRINE HYDROCHLORIDE 10 MG: 5 TABLET ORAL at 22:53

## 2023-10-17 RX ADMIN — Medication 10 ML: at 21:48

## 2023-10-17 RX ADMIN — METOCLOPRAMIDE 5 MG: 5 INJECTION, SOLUTION INTRAMUSCULAR; INTRAVENOUS at 06:05

## 2023-10-17 RX ADMIN — IPRATROPIUM BROMIDE AND ALBUTEROL SULFATE 3 ML: .5; 3 SOLUTION RESPIRATORY (INHALATION) at 19:28

## 2023-10-17 RX ADMIN — HYDRALAZINE HYDROCHLORIDE 20 MG: 20 INJECTION INTRAMUSCULAR; INTRAVENOUS at 03:21

## 2023-10-17 RX ADMIN — DEXMEDETOMIDINE HYDROCHLORIDE 1.3 MCG/KG/HR: 4 INJECTION, SOLUTION INTRAVENOUS at 10:36

## 2023-10-17 RX ADMIN — ACETYLCYSTEINE 3 ML: 200 SOLUTION ORAL; RESPIRATORY (INHALATION) at 15:22

## 2023-10-17 RX ADMIN — POLYETHYLENE GLYCOL 3350 17 G: 17 POWDER, FOR SOLUTION ORAL at 22:04

## 2023-10-17 RX ADMIN — GUAIFENESIN 400 MG: 200 TABLET ORAL at 13:47

## 2023-10-17 RX ADMIN — ACETYLCYSTEINE 3 ML: 200 SOLUTION ORAL; RESPIRATORY (INHALATION) at 19:28

## 2023-10-17 RX ADMIN — IPRATROPIUM BROMIDE AND ALBUTEROL SULFATE 3 ML: .5; 3 SOLUTION RESPIRATORY (INHALATION) at 15:22

## 2023-10-17 RX ADMIN — SODIUM CHLORIDE 1000 ML: 9 INJECTION, SOLUTION INTRAVENOUS at 04:07

## 2023-10-17 NOTE — SIGNIFICANT NOTE
10/17/23 1541   OTHER   Discipline occupational therapist   Rehab Time/Intention   Session Not Performed other (see comments);unable to treat, medical status change  (Remains inappropriate for OT. Will attempt tomorrow.)   Recommendation   OT - Next Appointment 10/18/23

## 2023-10-17 NOTE — PLAN OF CARE
Goal Outcome Evaluation:    Patient has required more sedation today. She wakes up and gets extremely agitated. Body and legs thrashing around in bed. She is in restraints but grabs her pillows and starts pulling them off the bed. Her daughter has been at the bedside all day and helps when needed. The precedex dropped her HR and BP so I had turned it off and have given her versed pushes which drop her BP. Going to talk to JOVANI Wright and see if we can do something else for sedation.

## 2023-10-17 NOTE — NURSING NOTE
"Patient's BP is still low after 1 liter LR bolus. I told JOVNAI Wright and she said, \"Give one more 1L LR bolus.\"  "

## 2023-10-17 NOTE — CONSULTS
Nutrition Services    Patient Name: Diane Guan  YOB: 1940  MRN: [id number]  Admission date: 10/10/2023    Comment:  -- Begin Peptamen AF at 30mL/hr + 30mL/hr water flush       PPE Documentation        PPE Worn By Provider mask and gloves   PPE Worn By Patient  None      CLINICAL NUTRITION ASSESSMENT      Reason for Assessment 10/17: Consult for tube feeding, LOS x 7 days      H&P      Past Medical History:   Diagnosis Date    Acute bronchitis due to human metapneumovirus 02/08/2020    Anxiety disorder     Arthritis     Breast cancer 02/2019    Invasive ductal carcinoma    Chronic lymphocytic leukemia (CLL), B-cell 01/2019    Depression     Disease of thyroid gland     Diverticulosis of colon 2018    Identified on colonoscopy    Dysphagia 12/2020    Dyspnea on exertion     Fall at home 10/11/2023    Hemorrhoid     Hiatal hernia 12/2020    Hiatal hernia with GERD     large hiatal hernia with high-grade reflux on barium swallow; s/p laparoscopic fundoplication with gastropexy    History of breast cancer 10/11/2023    History of cigarette smoking 10/11/2023    History of diabetes mellitus     no meds now    Hyperlipidemia     Hypertension     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    aspiration pneumonia; completed abx therapy    OAB (overactive bladder)     Personal history of CLL (chronic lymphocytic leukemia) 10/11/2023    Skin cancer     Sleep apnea     daughter states pt does not have and doesn't have machine       Past Surgical History:   Procedure Laterality Date    BLADDER SURGERY      BREAST BIOPSY      BRONCHOSCOPY N/A 09/13/2019    Procedure: BRONCHOSCOPY with bronchial washing;  Surgeon: Marnie Frankel MD;  Location: [facility] ENDOSCOPY;  Service: Pulmonary    COLONOSCOPY  07/19/2018    severe diverticulosis    CYST REMOVAL      Removed a fatty cyst off of her back    ENDOSCOPY N/A 11/23/2020    Procedure: ESOPHAGOGASTRODUODENOSCOPY with dilatation (Bougie # 48, 50, 52, 54, 56, 58);  Surgeon:  "Den Plummer DO;  Location: [facility] ENDOSCOPY;  Service: General;  Laterality: N/A;  Post: large hiatal hernia, joshua's ulcers    HIATAL HERNIA REPAIR N/A 12/30/2020    Procedure: Laparoscopic hiatal hernia repair with gastropexy;  Surgeon: Den Plummer DO;  Location: [facility] MAIN OR;  Service: General;  Laterality: N/A;    MASTECTOMY Right 02/04/2019    Invasive ductal carcinoma    TUBAL ABDOMINAL LIGATION          Current Problems   Acute respiratory failure  - intubated 10/15     LEXUS  - Nephrology following    Fall with right-sided chest pain     DM2     Hypertension     Breast cancer       Encounter Information        Trending Narrative     10/17: Admitted on 10/14 after a fall with right rib and right sided back pain.  Code event 10/15 resulting in intubation.  Patient discussed in AM rounds on 10/17 and MD wishes to begin tube feeding via OG tube already in place.  NFPE completed and not consistent with nutrition diagnosis of malnutrition at this time using AND/ASPEN criteria.  Noted with temporal muscle loss - likely age related.         Anthropometrics        Current Height, Weight Height: 162.6 cm (64\")  Weight: 80.3 kg (177 lb) (10/15/23 0952)       Usual Body Weight (UBW) Unable to obtain from patient        Trending Weight Hx     This admission: 10/17: 177#             PTA: 4.3% weight loss x 11 months     Wt Readings from Last 30 Encounters:   10/15/23 0952 80.3 kg (177 lb)   10/14/23 0040 80.7 kg (177 lb 14.6 oz)   10/10/23 2332 79 kg (174 lb 2.6 oz)   10/10/23 1252 78.5 kg (173 lb)   09/15/23 0917 80.7 kg (178 lb)   05/15/23 0918 80.3 kg (177 lb)   05/05/23 0852 80.3 kg (177 lb)   01/16/23 0919 82.6 kg (182 lb)   11/04/22 0806 83.9 kg (185 lb)   09/21/22 1520 82.6 kg (182 lb)   08/31/22 0832 85.3 kg (188 lb)   08/05/22 1559 85.5 kg (188 lb 9.6 oz)   03/15/22 0927 83 kg (183 lb)   10/25/21 1229 86.6 kg (191 lb)   07/22/21 0855 82.1 kg (181 lb)   07/08/21 0600 89.5 kg (197 lb 5 oz)   07/07/21 " 0600 88.7 kg (195 lb 8.8 oz)   07/05/21 2300 88.5 kg (195 lb 1.7 oz)   07/04/21 0600 85.3 kg (188 lb 0.8 oz)   07/02/21 1617 81.6 kg (180 lb)   03/26/21 0823 83.5 kg (184 lb)   01/20/21 1002 80.7 kg (178 lb)   01/13/21 0536 86.2 kg (190 lb 0.6 oz)   01/12/21 0500 84.9 kg (187 lb 2.7 oz)   01/11/21 0500 81.8 kg (180 lb 5.4 oz)   01/10/21 0500 82.8 kg (182 lb 8.7 oz)   01/09/21 0500 81.9 kg (180 lb 8.9 oz)   01/08/21 0551 85.4 kg (188 lb 4.4 oz)   01/07/21 0529 83.9 kg (184 lb 15.5 oz)   01/06/21 1414 82.1 kg (181 lb)   01/06/21 0536 82.5 kg (181 lb 14.1 oz)   01/05/21 0500 83.9 kg (184 lb 15.5 oz)   01/04/21 0506 83.9 kg (184 lb 15.5 oz)   01/03/21 0525 87.3 kg (192 lb 7.4 oz)   12/31/20 0534 83.6 kg (184 lb 4.9 oz)   12/16/20 1444 81.6 kg (180 lb)   12/03/20 1423 85.7 kg (189 lb)   11/23/20 0646 85.6 kg (188 lb 11.4 oz)   11/16/20 1200 85.3 kg (188 lb)   11/05/20 1427 85.3 kg (188 lb)   08/18/20 0949 85.3 kg (188 lb)   05/15/20 0818 83.5 kg (184 lb)   02/14/20 1042 81.6 kg (179 lb 12.8 oz)   02/12/20 0523 83 kg (183 lb)   02/11/20 0451 83.3 kg (183 lb 9.6 oz)   02/08/20 0330 80.1 kg (176 lb 9.4 oz)   02/07/20 0347 80.7 kg (177 lb 14.6 oz)   02/06/20 1248 81.5 kg (179 lb 10.8 oz)   02/04/20 1505 83 kg (183 lb)   01/03/20 1110 83.7 kg (184 lb 9.6 oz)   09/13/19 0730 80.5 kg (177 lb 7.5 oz)   09/12/19 0908 77.1 kg (170 lb)   06/25/19 1116 75.8 kg (167 lb)   04/04/19 0857 73 kg (161 lb)   01/24/19 1502 77.8 kg (171 lb 9.6 oz)   12/27/18 1118 77.6 kg (171 lb)      BMI kg/m2 Body mass index is 30.38 kg/m².       Labs        Pertinent Labs    Results from last 7 days   Lab Units 10/17/23  0424 10/16/23  0429 10/15/23  0616 10/11/23  1454 10/10/23  1510   SODIUM mmol/L 143 143 142   < > 142   POTASSIUM mmol/L 3.7 4.2 5.0   < > 5.2   CHLORIDE mmol/L 103 106 107   < > 110*   CO2 mmol/L 24.0 23.0 25.0   < > 20.0*   BUN mg/dL 65* 60* 52*   < > 37*   CREATININE mg/dL 1.54* 1.48* 1.43*   < > 1.32*   CALCIUM mg/dL 9.3 9.4 9.3   <  > 9.3   BILIRUBIN mg/dL 0.3  --   --   --  0.3   ALK PHOS U/L 110  --   --   --  125*   ALT (SGPT) U/L 9  --   --   --  12   AST (SGOT) U/L 11  --   --   --  16   GLUCOSE mg/dL 183* 131* 136*   < > 125*    < > = values in this interval not displayed.     Results from last 7 days   Lab Units 10/17/23  0424 10/16/23  0429 10/15/23  0616   MAGNESIUM mg/dL 1.7  --  1.9   PHOSPHORUS mg/dL 3.9  --  3.9   HEMOGLOBIN g/dL 10.3*   < > 9.9*   HEMATOCRIT % 33.8*   < > 32.8*    < > = values in this interval not displayed.     Lab Results   Component Value Date    HGBA1C 6.50 (H) 10/13/2023        Medications    Scheduled Medications acetylcysteine, 3 mL, Nebulization, TID  amLODIPine, 5 mg, Nasogastric, Daily  budesonide, 0.5 mg, Nebulization, Q12H  cefTRIAXone, 2,000 mg, Intravenous, Q24H  enoxaparin, 30 mg, Subcutaneous, Daily  ferrous sulfate, 300 mg, Nasogastric, Once per day on Mon Wed Fri  furosemide, 60 mg, Intravenous, Daily  guaiFENesin, 400 mg, Nasogastric, Q8H  insulin lispro, 2-7 Units, Subcutaneous, Q6H  ipratropium-albuterol, 3 mL, Nebulization, 4x Daily - RT  lansoprazole, 30 mg, Nasogastric, Q AM  levothyroxine, 100 mcg, Nasogastric, Q AM  metoclopramide, 5 mg, Intravenous, Q8H  polyethylene glycol, 17 g, Nasogastric, BID  rosuvastatin, 10 mg, Nasogastric, Nightly  senna-docusate sodium, 1 tablet, Nasogastric, BID  sertraline, 150 mg, Nasogastric, Daily  sodium chloride, 10 mL, Intravenous, Q12H  sodium chloride, 10 mL, Intravenous, Q12H        Infusions dexmedetomidine, 0.2-1.5 mcg/kg/hr, Last Rate: Stopped (10/17/23 1338)  fentanyl 10 mcg/mL,  mcg/hr, Last Rate: 150 mcg/hr (10/17/23 1344)        PRN Medications   acetaminophen    senna-docusate sodium **AND** polyethylene glycol **AND** bisacodyl **AND** bisacodyl    cyclobenzaprine    dextrose    dextrose    dextrose    fentaNYL citrate (PF)    glucagon (human recombinant)    hydrALAZINE    ipratropium-albuterol    midazolam    nitroglycerin     ondansetron    oxyCODONE    [COMPLETED] Insert Peripheral IV **AND** sodium chloride    sodium chloride    sodium chloride    sodium chloride    sodium chloride     Physical Findings        Trending Physical   Appearance, NFPE 10/17: NFPE completed and not consistent with nutrition diagnosis of malnutrition at this time using AND/ASPEN criteria.  Noted with temporal muscle loss likely age related.      --  Edema  No edema documented      Bowel Function Last documented BM 10/17 (today)     Tubes OG tube      Chewing/Swallowing Intubated      Skin Intact        Estimated/Assessed Needs    Estimated 10/17/23   Energy Requirements    EST Needs, Method, Wt used 1549 kcal/day ([facility] 2010)       Protein Requirements    EST Needs, Method, Wt used  grams/day (1.2-2.0 g/kg of IBW 54.5 kg)       Fluid Requirements     Estimated Needs (mL/day) 1 mL/kcal, will monitor hydration status      Fluid Deficit    Current Na Level (mEq/L)    Desired Na Level (mEq/L)    Estimated Fluid Deficit (L)       Current Nutrition Orders & Evaluation of Intake       Oral Nutrition     Food Allergies NKFA   Current PO Diet NPO Diet NPO Type: Strict NPO   Supplement None ordered    PO Evaluation     Trending % PO Intake 10/17: NPO     Enteral Nutrition    Enteral Route    Order, Modulars, Flushes    Residual/Tolerance    TF Observation         Parenteral Nutrition     TPN Route    Total # Days on TPN    TPN Order, Lipid Details    MVI & Trace Element Freq    TPN Observation         Nutrition Course Details    PO Diets: Heart Healthy, Low Potassium 10/15  NPO 10/15 to current 10/17   Nutrition Support: Tube feeding began 10/17     Nutritional Risk Screening        NRS-2002 Score          Nutrition Diagnosis         Nutrition Dx Problem 1 Inadequate energy intake related to vent as evidenced by NPO/TF for nutrition.       Nutrition Dx Problem 2        Intervention Goal         Intervention Goal(s) Begin and tolerate EN     Nutrition  Intervention        RD Action Order EN     Nutrition Prescription          Diet Prescription NPO   Supplement Prescription      Enteral Prescription Initial Goal:  *initial goal conservative d/t risk of RFS     Peptamen AF at 30mL/hr + 30mL/hr water flush      End Goal:    Peptamen AF at 60 mL/hr + water flush per clinical picture      Calories  1584 kcals (102%)   Protein  99 g (91% upper end)   Free water  1069 mL   Flushes  Will monitor hydration status      The above end goal rate is for 22 hrs/day to assume interruptions for ADLs. Water flushes adjusted based on clinical picture + Rx flushes/IV fluids     Specialized formula chosen r/t high protein needs in the critical care setting.           TPN Prescription      Monitor/Evaluation        Monitor Per protocol, I&O, Pertinent labs, EN delivery/tolerance, Weight, Skin status, GI status, Symptoms       Electronically signed by:  Vernell Gil RD  10/17/23 14:11 EDT

## 2023-10-17 NOTE — PROGRESS NOTES
"RENAL/KCC:     LOS: 5 days    Patient Care Team:  Asia Queen APRN as PCP - General (Nurse Practitioner)  Asia Queen APRN as Nurse Practitioner (Nurse Practitioner)  Ling Bolden MD as Consulting Physician (Hematology and Oncology)    Chief Complaint:  Acute resp failure    Subjective     Interval History:   Chart reviewed  Remains on the vent on 90% FiO2  1.9L UOP      Objective     Vital Sign Min/Max for last 24 hours  Temp  Min: 97.5 °F (36.4 °C)  Max: 99 °F (37.2 °C)   BP  Min: 63/25  Max: 163/61   Pulse  Min: 51  Max: 76   Resp  Min: 20  Max: 32   SpO2  Min: 90 %  Max: 99 %   No data recorded   No data recorded     Flowsheet Rows      Flowsheet Row First Filed Value   Admission Height 162.6 cm (64\") Documented at 10/10/2023 1252   Admission Weight 78.5 kg (173 lb) Documented at 10/10/2023 1252            No intake/output data recorded.  I/O last 3 completed shifts:  In: 3257.1 [I.V.:3257.1]  Out: 3050 [Urine:2750; Emesis/NG output:300]    Physical Exam:  GEN: Intubated, sedated  ENT: +ETT  NECK: Supple, no JVD  CHEST: Lungs are clear  CV: RRR, no M/G/R, no edema  ABD: Soft, NT, +BS  SKIN: Warm and Dry  NEURO: Sedated      WBC WBC   Date Value Ref Range Status   10/17/2023 14.40 (H) 3.40 - 10.80 10*3/mm3 Final   10/16/2023 17.40 (H) 3.40 - 10.80 10*3/mm3 Final   10/15/2023 16.10 (H) 3.40 - 10.80 10*3/mm3 Final      HGB Hemoglobin   Date Value Ref Range Status   10/17/2023 10.3 (L) 12.0 - 15.9 g/dL Final   10/16/2023 10.3 (L) 12.0 - 15.9 g/dL Final   10/15/2023 9.9 (L) 12.0 - 15.9 g/dL Final      HCT Hematocrit   Date Value Ref Range Status   10/17/2023 33.8 (L) 34.0 - 46.6 % Final   10/16/2023 33.3 (L) 34.0 - 46.6 % Final   10/15/2023 32.8 (L) 34.0 - 46.6 % Final      Platlets No results found for: \"LABPLAT\"   MCV MCV   Date Value Ref Range Status   10/17/2023 89.4 79.0 - 97.0 fL Final   10/16/2023 89.4 79.0 - 97.0 fL Final   10/15/2023 90.1 79.0 - 97.0 fL Final          Sodium Sodium " "  Date Value Ref Range Status   10/17/2023 143 136 - 145 mmol/L Final   10/16/2023 143 136 - 145 mmol/L Final   10/15/2023 142 136 - 145 mmol/L Final      Potassium Potassium   Date Value Ref Range Status   10/17/2023 3.7 3.5 - 5.2 mmol/L Final   10/16/2023 4.2 3.5 - 5.2 mmol/L Final   10/15/2023 5.0 3.5 - 5.2 mmol/L Final     Comment:     Slight hemolysis detected by analyzer. Results may be affected.   10/14/2023 5.2 3.5 - 5.2 mmol/L Final     Comment:     Slight hemolysis detected by analyzer. Results may be affected.      Chloride Chloride   Date Value Ref Range Status   10/17/2023 103 98 - 107 mmol/L Final   10/16/2023 106 98 - 107 mmol/L Final   10/15/2023 107 98 - 107 mmol/L Final      CO2 CO2   Date Value Ref Range Status   10/17/2023 24.0 22.0 - 29.0 mmol/L Final   10/16/2023 23.0 22.0 - 29.0 mmol/L Final   10/15/2023 25.0 22.0 - 29.0 mmol/L Final      BUN BUN   Date Value Ref Range Status   10/17/2023 65 (H) 8 - 23 mg/dL Final   10/16/2023 60 (H) 8 - 23 mg/dL Final   10/15/2023 52 (H) 8 - 23 mg/dL Final      Creatinine Creatinine   Date Value Ref Range Status   10/17/2023 1.54 (H) 0.57 - 1.00 mg/dL Final   10/16/2023 1.48 (H) 0.57 - 1.00 mg/dL Final   10/15/2023 1.43 (H) 0.57 - 1.00 mg/dL Final      Calcium Calcium   Date Value Ref Range Status   10/17/2023 9.3 8.6 - 10.5 mg/dL Final   10/16/2023 9.4 8.6 - 10.5 mg/dL Final   10/15/2023 9.3 8.6 - 10.5 mg/dL Final      PO4 No results found for: \"CAPO4\"   Albumin Albumin   Date Value Ref Range Status   10/17/2023 3.3 (L) 3.5 - 5.2 g/dL Final      Magnesium Magnesium   Date Value Ref Range Status   10/17/2023 1.7 1.6 - 2.4 mg/dL Final   10/15/2023 1.9 1.6 - 2.4 mg/dL Final      Uric Acid No results found for: \"URICACID\"        Results Review:     I reviewed the patient's new clinical results.    amLODIPine, 5 mg, Nasogastric, Daily  budesonide, 0.5 mg, Nebulization, Q12H  cefTRIAXone, 2,000 mg, Intravenous, Q24H  enoxaparin, 30 mg, Subcutaneous, Daily  ferrous " sulfate, 300 mg, Nasogastric, Once per day on Mon Wed Fri  guaiFENesin, 400 mg, Nasogastric, Q8H  insulin lispro, 2-7 Units, Subcutaneous, Q6H  lansoprazole, 30 mg, Nasogastric, Q AM  levothyroxine, 100 mcg, Nasogastric, Q AM  magnesium sulfate, 2 g, Intravenous, Once  metoclopramide, 5 mg, Intravenous, Q8H  polyethylene glycol, 17 g, Nasogastric, BID  rosuvastatin, 10 mg, Nasogastric, Nightly  senna-docusate sodium, 1 tablet, Nasogastric, BID  sertraline, 150 mg, Nasogastric, Daily  sodium chloride, 10 mL, Intravenous, Q12H  sodium chloride, 10 mL, Intravenous, Q12H      dexmedetomidine, 0.2-1.5 mcg/kg/hr, Last Rate: 1.3 mcg/kg/hr (10/16/23 3032)  fentanyl 10 mcg/mL,  mcg/hr, Last Rate: 150 mcg/hr (10/17/23 9503)        Medication Review: Reviewed    Assessment & Plan     LEXUS/CKD3  Hyperkalemia  Hypocalcemia    Acute hypoxemic respiratory failure    Mixed anxiety and depressive disorder    Primary osteoarthritis involving multiple joints    Hiatal hernia with gastroesophageal reflux    Mixed hyperlipidemia    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    Acquired hypothyroidism    Type 2 diabetes mellitus with stage 3b chronic kidney disease, without long-term current use of insulin    Insomnia    Overactive bladder    Vitamin B 12 deficiency    Vitamin D deficiency    LIBBY (obstructive sleep apnea)    COPD (chronic obstructive pulmonary disease)    CKD (chronic kidney disease) stage 3, GFR 30-59 ml/min    History of breast cancer    Personal history of CLL (chronic lymphocytic leukemia)    History of cigarette smoking    Fall at home    Rhinovirus infection    Acute respiratory failure    Hyperkalemia    Respiratory failure    Pleural effusion    Plan: Cr stable at 1.4-1.5.  FC in place for urinary retention.  Replace K and Ca.  Continue with daily diuretic.  Vent per Pulm.  Will follow.      Frederick Bonds MD  [facility]  10/17/23  09:16 EDT

## 2023-10-17 NOTE — PROGRESS NOTES
Critical Care Progress Note   Diane Guan : 1940 MRN:[id number] LOS:5     Principal Problem: Acute hypoxemic respiratory failure     Reason for follow up: All the medical problems listed below    Summary     The following information has been supplemented by review of documentation, collaboration with other providers, and interview with the patient's daughter at bedside as the patient is confused at the time of examination.  The patient presented to the emergency department on 10/10/2023 for evaluation of right chest wall pain following a fall at home.  The patient's daughter reports that the patient lost her balance and fell when she got up in the dark to go to the restroom.  She apparently had preached for the light switch at which time stumbled and fell with her right side hitting the elevated toilet seat and then fell to her right side striking the floor.  There was no report of LOC and following the fall the patient was not confused.  The patient's daughter also reports that the patient had complained of a cough productive of clear secretions approximately 3 weeks ago.  On  the patient was evaluated at an urgent care facility where she was prescribed a Z-Erick however had no improvement.     On evaluation in the emergency department the patient was found to have hypoxic/hypercapnic respiratory failure which responded well to O2 therapy.  Her home dose diuretics were continued.  She was admitted for further evaluation and treatment to the hospitalist.  The patient's daughter states that at approximately 5 PM yesterday evening the patient developed worsening confusion and hallucinations.  Overnight the patient developed worsening O2 needs and hypercapnia requiring increased supplementation to BiPAP therapy.    ACP: No ACP documentation on file.  Patient is currently confused however her adult daughter is at bedside and the patient indicates that decisions can be made by her.     Significant events      10/17/23 : Pt still requiring 90% FiO2.  Sputum Cx NGTD.  Had 1.9 L UOP last 24h, net -1 L.  Cr stable.  WBC down to 14.  Hb stable. Ucx growing >100k both E coli and K pneumo.  Repeat CXR in am.    Assessment / Plan     Acute respiratory failure with hypoxia and hypercapnia  Multifactorial with AE COPD, volume overload with small left pleural effusion, splinting due to pain with inspiration   Sepsis  Acute cystitis w/o hematuria--E coli & K pneumo  Likely due to fluid volume overload and mild/acute exacerbation COPD  BiPAP settings noted and adjusted as needed.  Airvo as tolerated  Close monitoring of ABG as clinically indicated.   Avoid sedating medications  Scheduled Symbicort, as needed DuoNebs  Procalcitonin normal  Echocardiogram 1/6/2021, EF 65%  Repeat echocardiogram pending  Continue chlorthalidone per home dose.  Further diuresis per nephrology  Diuresis per nephrology  Encourage I-S and flutter while awake  Pt intubated and on mechanical ventilation as of 10/15  Precedex and fentanyl for vent comfort.  Wean FiO2 as tolerates.  On Rocephin       Diabetes melitis type II  Hold home dose metformin due to LEXUS  Accu-Cheks before meals and at bedtime with SSI for tight glycemic control  Hemoglobin A1c pending       Acute metabolic encephalopathy  Likely metabolic secondary to hypercapnia and hypoxia  Patient has not returned to their baseline at this time.    Urinalysis with reflex culture pending  TSH/free T4 pending  CT head pending       Essential hypertension  Continue home dose Norvasc  Titrate meds as needed       Acute kidney injury on CKD stage III, baseline creatinine 1.3  Acute urinary retention  Hyperkalemia  Remains non oliguric.   Likely prerenal. Possible intrinsic component due to ATN from infection, drugs and hypotension.   C/w IV hydration.   Nephrology consulted  Renal ultrasound pending  Monitor Input/Output very closely.   Received hyper-K treatment this morning.  Lasix x1 per nephrology  now  Place Bellamy catheter for now to relieve urinary retention and close I/O monitoring  Net IO Since Admission: 1,160 mL [10/14/23 1241]         Diabetes mellitus type 2  Hold home medications due to LEXUS  SSI for tight glycemic control  Hemoglobin A1c       Constipation with abdominal discomfort  KUB negative for obstructive pattern, moderate stool burden  Schedule bowel regimen  Dulcolax suppository x1 now, add enema if nonproductive  Continue Reglan, EKG in a.m.     Chronic and stable  Hypothyroidism--continue levothyroxine.  TSH/free T4 pending  Hyperlipidemia--continue statin therapy  Breast cancer, s/p right mastectomy.  Anastrozole since 2019--continue  CLL, leukocytosis at baseline  Mycobacterium AVM complex, s/p antibiotic regimen  Falls at home/functional decline      Critical Care Time:  34 mins.      Code status:   Level Of Support Discussed With: Patient  Code Status (Patient has no pulse and is not breathing): CPR (Attempt to Resuscitate)  Medical Interventions (Patient has pulse or is breathing): Full Support       Nutrition: NPO Diet NPO Type: Strict NPO   Diet, Tube Feeding Tube Feeding Formula: Peptamen AF (Vital AF 1.2); Tube Feeding Type: Continuous; Continuous Tube Feeding Start Rate (mL/hr): 30; Then Advance Rate By (mL/hr): Do Not Advance; Every __ Hours: Patient at Goal Rate; To Goal Rate of...    DVT prophylaxis:  Medical and mechanical DVT prophylaxis orders are present.     Subjective / Review of systems     Review of Systems   Unable to be obtained 2/2 pt's current condition.  Objective / Physical Exam   Vital signs:  Temp: 98.2 °F (36.8 °C)  BP: 111/40  Heart Rate: (!) 46  Resp: 20  SpO2: 91 %  Weight: 80.3 kg (177 lb)    Admission Weight: Weight: 78.5 kg (173 lb)  Current Weight: Weight: 80.3 kg (177 lb)    Input/Output in last 24 hours:    Intake/Output Summary (Last 24 hours) at 10/17/2023 1159  Last data filed at 10/17/2023 0600  Gross per 24 hour   Intake 2566.05 ml   Output 1950 ml    Net 616.05 ml      Physical Exam     GEN:  Elderly woman, intubated, sedated, on vent.  NEURO:  Brainstem reflexes intact.  No obvious focal deficit.  Moves all 4 ext.  HEENT:  N/AT.  PERRL.  MMM.  Oropharynx non-erythematous.  No drainage from the eyes/ears/nose.  No conjunctival petechiae.  No oral thrush. ETT in place.  NECK:  Supple, NT, trachea midline.  No meningismus.    CHEST/LUNGS:  Breath sounds are diminished but clear.  Chest excursion equal bilaterally.    CARDIOVASCULAR:  RRR w/o murmur noted.  GI:  Abdomen soft, NT, ND, +BS.   :  External urinary catheter in place.  EXTREMITIES:  No deformity or amputation.  No cyanosis, trace edema, no asymmetry.  Pulses 2+ and equal in BLE's.    SKIN:  Warm, dry, and pink.  No rash, breakdown, or track marks noted.  LYMPHATICS/HEME:  No overt LAD or abnormal bruising.  No lymphedema.  MSK:  Normal ROM.  No joint abnormalities noted.  Strength is 5/5 and equal in BUE and BLE's.  PSYCH:  Unable to be obtained 2/2 pt's current condition.          Radiology and Labs     Results from last 7 days   Lab Units 10/17/23  0424 10/16/23  0429 10/15/23  0616 10/14/23  0753 10/13/23  2356 10/11/23  1454   WBC 10*3/mm3 14.40* 17.40* 16.10*  --  28.80* 26.30*   HEMATOCRIT % 33.8* 33.3* 32.8*  --  37.6 34.4   HEMATOCRIT POC %  --   --   --  37*  --   --    PLATELETS 10*3/mm3 122* 131* 131*  --  148 158      Results from last 7 days   Lab Units 10/17/23  0424 10/16/23  0429 10/15/23  0616 10/14/23  1620 10/14/23  0425 10/13/23  2356   SODIUM mmol/L 143 143 142  --  139 136   POTASSIUM mmol/L 3.7 4.2 5.0 5.2 6.0* 5.6*   CHLORIDE mmol/L 103 106 107  --  104 105   CO2 mmol/L 24.0 23.0 25.0  --  22.0 18.0*   BUN mg/dL 65* 60* 52*  --  49* 47*   CREATININE mg/dL 1.54* 1.48* 1.43*  --  1.55* 1.46*      Current medications   Scheduled Meds: acetylcysteine, 3 mL, Nebulization, TID  amLODIPine, 5 mg, Nasogastric, Daily  budesonide, 0.5 mg, Nebulization, Q12H  cefTRIAXone, 2,000 mg,  Intravenous, Q24H  enoxaparin, 30 mg, Subcutaneous, Daily  ferrous sulfate, 300 mg, Nasogastric, Once per day on Mon Wed Fri  furosemide, 60 mg, Intravenous, Daily  guaiFENesin, 400 mg, Nasogastric, Q8H  insulin lispro, 2-7 Units, Subcutaneous, Q6H  ipratropium-albuterol, 3 mL, Nebulization, 4x Daily - RT  lansoprazole, 30 mg, Nasogastric, Q AM  levothyroxine, 100 mcg, Nasogastric, Q AM  metoclopramide, 5 mg, Intravenous, Q8H  polyethylene glycol, 17 g, Nasogastric, BID  rosuvastatin, 10 mg, Nasogastric, Nightly  senna-docusate sodium, 1 tablet, Nasogastric, BID  sertraline, 150 mg, Nasogastric, Daily  sodium chloride, 10 mL, Intravenous, Q12H  sodium chloride, 10 mL, Intravenous, Q12H      Continuous Infusions: dexmedetomidine, 0.2-1.5 mcg/kg/hr, Last Rate: 1.3 mcg/kg/hr (10/17/23 1036)  fentanyl 10 mcg/mL,  mcg/hr, Last Rate: 150 mcg/hr (10/17/23 1001)        Plan discussed with RN. Reviewed all other data in the last 24 hours, including but not limited to vitals, labs, microbiology, imaging and pertinent notes from other providers.     Vinicius Heredia, DO   Critical Care  10/17/23   11:59 EDT

## 2023-10-17 NOTE — SIGNIFICANT NOTE
10/17/23 1546   OTHER   Discipline physical therapist   Rehab Time/Intention   Session Not Performed other (see comments);unable to evaluate, medical status change  (Pt still intubated and not appropriate for PT this session. Will follow-up tomorrow, 10/18/23.)   Therapy Assessment/Plan (PT)   Criteria for Skilled Interventions Met (PT) yes;meets criteria   Recommendation   PT - Next Appointment 10/18/23

## 2023-10-18 ENCOUNTER — APPOINTMENT (OUTPATIENT)
Dept: CT IMAGING | Facility: HOSPITAL | Age: 83
DRG: 207 | End: 2023-10-18
Payer: MEDICARE

## 2023-10-18 ENCOUNTER — APPOINTMENT (OUTPATIENT)
Dept: GENERAL RADIOLOGY | Facility: HOSPITAL | Age: 83
DRG: 207 | End: 2023-10-18
Payer: MEDICARE

## 2023-10-18 ENCOUNTER — APPOINTMENT (OUTPATIENT)
Dept: ULTRASOUND IMAGING | Facility: HOSPITAL | Age: 83
DRG: 207 | End: 2023-10-18
Payer: MEDICARE

## 2023-10-18 LAB
ALBUMIN SERPL-MCNC: 3 G/DL (ref 3.5–5.2)
ALBUMIN/GLOB SERPL: 1.2 G/DL
ALP SERPL-CCNC: 97 U/L (ref 39–117)
ALT SERPL W P-5'-P-CCNC: 11 U/L (ref 1–33)
ANION GAP SERPL CALCULATED.3IONS-SCNC: 15 MMOL/L (ref 5–15)
ARTERIAL PATENCY WRIST A: POSITIVE
AST SERPL-CCNC: 23 U/L (ref 1–32)
ATMOSPHERIC PRESS: ABNORMAL MM[HG]
BACTERIA SPEC RESP CULT: NORMAL
BASE EXCESS BLDA CALC-SCNC: -2.8 MMOL/L (ref 0–3)
BASOPHILS # BLD MANUAL: 0.6 10*3/MM3 (ref 0–0.2)
BASOPHILS NFR BLD MANUAL: 2 % (ref 0–1.5)
BDY SITE: ABNORMAL
BILIRUB SERPL-MCNC: 0.2 MG/DL (ref 0–1.2)
BLASTS NFR BLD MANUAL: 1 % (ref 0–0)
BUN SERPL-MCNC: 61 MG/DL (ref 8–23)
BUN/CREAT SERPL: 29.3 (ref 7–25)
CA-I SERPL ISE-MCNC: 1.19 MMOL/L (ref 1.2–1.3)
CALCIUM SPEC-SCNC: 8.6 MG/DL (ref 8.6–10.5)
CHLORIDE SERPL-SCNC: 99 MMOL/L (ref 98–107)
CO2 BLDA-SCNC: 24.8 MMOL/L (ref 22–29)
CO2 SERPL-SCNC: 24 MMOL/L (ref 22–29)
CREAT SERPL-MCNC: 2.08 MG/DL (ref 0.57–1)
DEPRECATED RDW RBC AUTO: 48.6 FL (ref 37–54)
EGFRCR SERPLBLD CKD-EPI 2021: 23.4 ML/MIN/1.73
ELLIPTOCYTES BLD QL SMEAR: ABNORMAL
ERYTHROCYTE [DISTWIDTH] IN BLOOD BY AUTOMATED COUNT: 15.1 % (ref 12.3–15.4)
GLOBULIN UR ELPH-MCNC: 2.5 GM/DL
GLUCOSE BLDC GLUCOMTR-MCNC: 100 MG/DL (ref 70–105)
GLUCOSE BLDC GLUCOMTR-MCNC: 108 MG/DL (ref 70–105)
GLUCOSE BLDC GLUCOMTR-MCNC: 140 MG/DL (ref 70–105)
GLUCOSE BLDC GLUCOMTR-MCNC: 148 MG/DL (ref 74–100)
GLUCOSE BLDC GLUCOMTR-MCNC: 97 MG/DL (ref 70–105)
GLUCOSE SERPL-MCNC: 147 MG/DL (ref 65–99)
GRAM STN SPEC: NORMAL
GRAM STN SPEC: NORMAL
HCO3 BLDA-SCNC: 23.4 MMOL/L (ref 21–28)
HCT VFR BLD AUTO: 28.3 % (ref 34–46.6)
HEMODILUTION: NO
HGB BLD-MCNC: 8.9 G/DL (ref 12–15.9)
INHALED O2 CONCENTRATION: 100 %
LARGE PLATELETS: ABNORMAL
LYMPHOCYTES # BLD MANUAL: 20.54 10*3/MM3 (ref 0.7–3.1)
LYMPHOCYTES NFR BLD MANUAL: 3 % (ref 5–12)
MAGNESIUM SERPL-MCNC: 2 MG/DL (ref 1.6–2.4)
MCH RBC QN AUTO: 27.6 PG (ref 26.6–33)
MCHC RBC AUTO-ENTMCNC: 31.7 G/DL (ref 31.5–35.7)
MCV RBC AUTO: 87.2 FL (ref 79–97)
MODALITY: ABNORMAL
MONOCYTES # BLD: 0.91 10*3/MM3 (ref 0.1–0.9)
MRSA DNA SPEC QL NAA+PROBE: NORMAL
NEUTROPHILS # BLD AUTO: 7.85 10*3/MM3 (ref 1.7–7)
NEUTROPHILS NFR BLD MANUAL: 19 % (ref 42.7–76)
NEUTS BAND NFR BLD MANUAL: 7 % (ref 0–5)
PCO2 BLDA: 45.8 MM HG (ref 35–48)
PEEP RESPIRATORY: 10 CM[H2O]
PH BLDA: 7.32 PH UNITS (ref 7.35–7.45)
PHOSPHATE SERPL-MCNC: 4.2 MG/DL (ref 2.5–4.5)
PLATELET # BLD AUTO: 166 10*3/MM3 (ref 140–450)
PMV BLD AUTO: 10.4 FL (ref 6–12)
PO2 BLDA: 101.7 MM HG (ref 83–108)
POTASSIUM SERPL-SCNC: 3.6 MMOL/L (ref 3.5–5.2)
PROCALCITONIN SERPL-MCNC: 0.34 NG/ML (ref 0–0.25)
PROT SERPL-MCNC: 5.5 G/DL (ref 6–8.5)
RBC # BLD AUTO: 3.24 10*6/MM3 (ref 3.77–5.28)
RESPIRATORY RATE: 20
SAO2 % BLDCOA: 97.2 % (ref 94–98)
SCAN SLIDE: NORMAL
SMUDGE CELLS BLD QL SMEAR: ABNORMAL
SODIUM SERPL-SCNC: 138 MMOL/L (ref 136–145)
VARIANT LYMPHS NFR BLD MANUAL: 1 % (ref 0–5)
VARIANT LYMPHS NFR BLD MANUAL: 67 % (ref 19.6–45.3)
VENTILATOR MODE: AC
VT ON VENT VENT: 450 ML
WBC NRBC COR # BLD: 30.2 10*3/MM3 (ref 3.4–10.8)

## 2023-10-18 PROCEDURE — 80053 COMPREHEN METABOLIC PANEL: CPT | Performed by: STUDENT IN AN ORGANIZED HEALTH CARE EDUCATION/TRAINING PROGRAM

## 2023-10-18 PROCEDURE — 76705 ECHO EXAM OF ABDOMEN: CPT

## 2023-10-18 PROCEDURE — 87070 CULTURE OTHR SPECIMN AEROBIC: CPT | Performed by: INTERNAL MEDICINE

## 2023-10-18 PROCEDURE — 87116 MYCOBACTERIA CULTURE: CPT | Performed by: INTERNAL MEDICINE

## 2023-10-18 PROCEDURE — 71045 X-RAY EXAM CHEST 1 VIEW: CPT

## 2023-10-18 PROCEDURE — 0BCB8ZZ EXTIRPATION OF MATTER FROM LEFT LOWER LOBE BRONCHUS, VIA NATURAL OR ARTIFICIAL OPENING ENDOSCOPIC: ICD-10-PCS | Performed by: INTERNAL MEDICINE

## 2023-10-18 PROCEDURE — 25010000002 CEFTRIAXONE PER 250 MG: Performed by: INTERNAL MEDICINE

## 2023-10-18 PROCEDURE — 94669 MECHANICAL CHEST WALL OSCILL: CPT

## 2023-10-18 PROCEDURE — 85025 COMPLETE CBC W/AUTO DIFF WBC: CPT | Performed by: NURSE PRACTITIONER

## 2023-10-18 PROCEDURE — 0 MIDAZOLAM 1 MG/ML 100ML NS 100 MG/100ML SOLUTION: Performed by: INTERNAL MEDICINE

## 2023-10-18 PROCEDURE — 87641 MR-STAPH DNA AMP PROBE: CPT | Performed by: INTERNAL MEDICINE

## 2023-10-18 PROCEDURE — 25010000002 HYDROMORPHONE HCL PF 50 MG/5ML SOLUTION: Performed by: NURSE PRACTITIONER

## 2023-10-18 PROCEDURE — 84145 PROCALCITONIN (PCT): CPT | Performed by: INTERNAL MEDICINE

## 2023-10-18 PROCEDURE — 87102 FUNGUS ISOLATION CULTURE: CPT | Performed by: INTERNAL MEDICINE

## 2023-10-18 PROCEDURE — 85007 BL SMEAR W/DIFF WBC COUNT: CPT | Performed by: NURSE PRACTITIONER

## 2023-10-18 PROCEDURE — 25010000002 PIPERACILLIN SOD-TAZOBACTAM PER 1 G: Performed by: INTERNAL MEDICINE

## 2023-10-18 PROCEDURE — 94799 UNLISTED PULMONARY SVC/PX: CPT

## 2023-10-18 PROCEDURE — 25010000002 ENOXAPARIN PER 10 MG: Performed by: INTERNAL MEDICINE

## 2023-10-18 PROCEDURE — 94664 DEMO&/EVAL PT USE INHALER: CPT

## 2023-10-18 PROCEDURE — 74176 CT ABD & PELVIS W/O CONTRAST: CPT

## 2023-10-18 PROCEDURE — 25010000002 PHENYLEPHRINE 10 MG/ML SOLUTION 5 ML VIAL: Performed by: INTERNAL MEDICINE

## 2023-10-18 PROCEDURE — 84100 ASSAY OF PHOSPHORUS: CPT | Performed by: STUDENT IN AN ORGANIZED HEALTH CARE EDUCATION/TRAINING PROGRAM

## 2023-10-18 PROCEDURE — 71250 CT THORAX DX C-: CPT

## 2023-10-18 PROCEDURE — B548ZZA ULTRASONOGRAPHY OF SUPERIOR VENA CAVA, GUIDANCE: ICD-10-PCS | Performed by: INTERNAL MEDICINE

## 2023-10-18 PROCEDURE — 25810000003 SODIUM CHLORIDE 0.9 % SOLUTION 250 ML FLEX CONT: Performed by: INTERNAL MEDICINE

## 2023-10-18 PROCEDURE — 82948 REAGENT STRIP/BLOOD GLUCOSE: CPT

## 2023-10-18 PROCEDURE — C1751 CATH, INF, PER/CENT/MIDLINE: HCPCS

## 2023-10-18 PROCEDURE — 87205 SMEAR GRAM STAIN: CPT | Performed by: INTERNAL MEDICINE

## 2023-10-18 PROCEDURE — 82803 BLOOD GASES ANY COMBINATION: CPT

## 2023-10-18 PROCEDURE — 94003 VENT MGMT INPAT SUBQ DAY: CPT

## 2023-10-18 PROCEDURE — 87040 BLOOD CULTURE FOR BACTERIA: CPT | Performed by: INTERNAL MEDICINE

## 2023-10-18 PROCEDURE — 25010000002 CALCIUM GLUCONATE-NACL 1-0.675 GM/50ML-% SOLUTION: Performed by: INTERNAL MEDICINE

## 2023-10-18 PROCEDURE — 25010000002 MIDAZOLAM PER 1 MG: Performed by: NURSE PRACTITIONER

## 2023-10-18 PROCEDURE — 87206 SMEAR FLUORESCENT/ACID STAI: CPT | Performed by: INTERNAL MEDICINE

## 2023-10-18 PROCEDURE — 36600 WITHDRAWAL OF ARTERIAL BLOOD: CPT

## 2023-10-18 PROCEDURE — 82330 ASSAY OF CALCIUM: CPT | Performed by: STUDENT IN AN ORGANIZED HEALTH CARE EDUCATION/TRAINING PROGRAM

## 2023-10-18 PROCEDURE — 02HV33Z INSERTION OF INFUSION DEVICE INTO SUPERIOR VENA CAVA, PERCUTANEOUS APPROACH: ICD-10-PCS | Performed by: INTERNAL MEDICINE

## 2023-10-18 PROCEDURE — 83735 ASSAY OF MAGNESIUM: CPT | Performed by: STUDENT IN AN ORGANIZED HEALTH CARE EDUCATION/TRAINING PROGRAM

## 2023-10-18 PROCEDURE — 25010000002 METOCLOPRAMIDE PER 10 MG: Performed by: NURSE PRACTITIONER

## 2023-10-18 RX ORDER — LIDOCAINE 50 MG/G
OINTMENT TOPICAL AS NEEDED
Status: DISCONTINUED | OUTPATIENT
Start: 2023-10-18 | End: 2023-11-06 | Stop reason: HOSPADM

## 2023-10-18 RX ORDER — MIDODRINE HYDROCHLORIDE 5 MG/1
10 TABLET ORAL EVERY 8 HOURS
Status: DISCONTINUED | OUTPATIENT
Start: 2023-10-18 | End: 2023-10-18

## 2023-10-18 RX ORDER — CYCLOBENZAPRINE HCL 10 MG
10 TABLET ORAL 3 TIMES DAILY PRN
Status: DISCONTINUED | OUTPATIENT
Start: 2023-10-18 | End: 2023-11-06 | Stop reason: HOSPADM

## 2023-10-18 RX ORDER — FENTANYL CITRATE-0.9 % NACL/PF 10 MCG/ML
50-300 PLASTIC BAG, INJECTION (ML) INTRAVENOUS
Status: DISCONTINUED | OUTPATIENT
Start: 2023-10-18 | End: 2023-10-18

## 2023-10-18 RX ORDER — CALCIUM GLUCONATE 20 MG/ML
1000 INJECTION, SOLUTION INTRAVENOUS ONCE
Status: COMPLETED | OUTPATIENT
Start: 2023-10-18 | End: 2023-10-18

## 2023-10-18 RX ORDER — NICOTINE POLACRILEX 4 MG
15 LOZENGE BUCCAL
Status: DISCONTINUED | OUTPATIENT
Start: 2023-10-18 | End: 2023-11-06 | Stop reason: HOSPADM

## 2023-10-18 RX ORDER — OXYCODONE HYDROCHLORIDE 5 MG/1
5 TABLET ORAL EVERY 6 HOURS PRN
Status: DISCONTINUED | OUTPATIENT
Start: 2023-10-18 | End: 2023-11-06 | Stop reason: HOSPADM

## 2023-10-18 RX ORDER — ACETAMINOPHEN 325 MG/1
650 TABLET ORAL EVERY 4 HOURS PRN
Status: DISCONTINUED | OUTPATIENT
Start: 2023-10-18 | End: 2023-11-06 | Stop reason: HOSPADM

## 2023-10-18 RX ORDER — MIDAZOLAM IN NACL,ISO-OSMOT/PF 50 MG/50ML
1-10 INFUSION BOTTLE (ML) INTRAVENOUS
Status: DISCONTINUED | OUTPATIENT
Start: 2023-10-18 | End: 2023-10-19

## 2023-10-18 RX ORDER — NOREPINEPHRINE BITARTRATE 0.03 MG/ML
.02-.5 INJECTION, SOLUTION INTRAVENOUS
Status: DISCONTINUED | OUTPATIENT
Start: 2023-10-18 | End: 2023-10-18

## 2023-10-18 RX ADMIN — Medication 10 ML: at 20:14

## 2023-10-18 RX ADMIN — KETAMINE HYDROCHLORIDE 1 MG/KG/HR: 100 INJECTION, SOLUTION, CONCENTRATE INTRAMUSCULAR; INTRAVENOUS at 17:53

## 2023-10-18 RX ADMIN — IPRATROPIUM BROMIDE AND ALBUTEROL SULFATE 3 ML: .5; 3 SOLUTION RESPIRATORY (INHALATION) at 18:55

## 2023-10-18 RX ADMIN — SENNOSIDES AND DOCUSATE SODIUM 1 TABLET: 50; 8.6 TABLET ORAL at 10:00

## 2023-10-18 RX ADMIN — POLYETHYLENE GLYCOL 3350 17 G: 17 POWDER, FOR SOLUTION ORAL at 10:00

## 2023-10-18 RX ADMIN — GUAIFENESIN 400 MG: 200 TABLET ORAL at 12:12

## 2023-10-18 RX ADMIN — CALCIUM GLUCONATE 1000 MG: 20 INJECTION, SOLUTION INTRAVENOUS at 11:55

## 2023-10-18 RX ADMIN — ENOXAPARIN SODIUM 30 MG: 100 INJECTION SUBCUTANEOUS at 16:26

## 2023-10-18 RX ADMIN — PIPERACILLIN AND TAZOBACTAM 3.38 G: 3; .375 INJECTION, POWDER, FOR SOLUTION INTRAVENOUS at 15:22

## 2023-10-18 RX ADMIN — SERTRALINE 150 MG: 100 TABLET, FILM COATED ORAL at 10:00

## 2023-10-18 RX ADMIN — KETAMINE HYDROCHLORIDE 0.8 MG/KG/HR: 100 INJECTION, SOLUTION, CONCENTRATE INTRAMUSCULAR; INTRAVENOUS at 23:10

## 2023-10-18 RX ADMIN — KETAMINE HYDROCHLORIDE 0.8 MG/KG/HR: 100 INJECTION, SOLUTION, CONCENTRATE INTRAMUSCULAR; INTRAVENOUS at 19:32

## 2023-10-18 RX ADMIN — Medication 300 MG: at 10:00

## 2023-10-18 RX ADMIN — MIDAZOLAM HYDROCHLORIDE 6 MG/HR: 1 INJECTION, SOLUTION INTRAVENOUS at 20:08

## 2023-10-18 RX ADMIN — Medication 10 ML: at 12:11

## 2023-10-18 RX ADMIN — LANSOPRAZOLE 30 MG: 30 TABLET, ORALLY DISINTEGRATING ORAL at 05:34

## 2023-10-18 RX ADMIN — IPRATROPIUM BROMIDE AND ALBUTEROL SULFATE 3 ML: .5; 3 SOLUTION RESPIRATORY (INHALATION) at 13:00

## 2023-10-18 RX ADMIN — BUDESONIDE 0.5 MG: 0.5 INHALANT RESPIRATORY (INHALATION) at 07:14

## 2023-10-18 RX ADMIN — Medication 300 MCG/HR: at 14:49

## 2023-10-18 RX ADMIN — PIPERACILLIN AND TAZOBACTAM 3.38 G: 3; .375 INJECTION, POWDER, FOR SOLUTION INTRAVENOUS at 19:56

## 2023-10-18 RX ADMIN — KETAMINE HYDROCHLORIDE 0.06 MG/KG/HR: 100 INJECTION, SOLUTION, CONCENTRATE INTRAMUSCULAR; INTRAVENOUS at 14:48

## 2023-10-18 RX ADMIN — BUMETANIDE 1 MG/HR: 0.25 INJECTION INTRAMUSCULAR; INTRAVENOUS at 12:39

## 2023-10-18 RX ADMIN — IPRATROPIUM BROMIDE AND ALBUTEROL SULFATE 3 ML: .5; 3 SOLUTION RESPIRATORY (INHALATION) at 07:10

## 2023-10-18 RX ADMIN — ACETYLCYSTEINE 3 ML: 200 SOLUTION ORAL; RESPIRATORY (INHALATION) at 15:00

## 2023-10-18 RX ADMIN — LEVOTHYROXINE SODIUM 100 MCG: 0.1 TABLET ORAL at 06:28

## 2023-10-18 RX ADMIN — GUAIFENESIN 400 MG: 200 TABLET ORAL at 05:34

## 2023-10-18 RX ADMIN — ACETYLCYSTEINE 3 ML: 200 SOLUTION ORAL; RESPIRATORY (INHALATION) at 18:55

## 2023-10-18 RX ADMIN — CEFTRIAXONE 2000 MG: 2 INJECTION, POWDER, FOR SOLUTION INTRAMUSCULAR; INTRAVENOUS at 10:00

## 2023-10-18 RX ADMIN — IPRATROPIUM BROMIDE AND ALBUTEROL SULFATE 3 ML: .5; 3 SOLUTION RESPIRATORY (INHALATION) at 15:00

## 2023-10-18 RX ADMIN — MIDAZOLAM 1 MG: 1 INJECTION INTRAMUSCULAR; INTRAVENOUS at 03:33

## 2023-10-18 RX ADMIN — METOCLOPRAMIDE 5 MG: 5 INJECTION, SOLUTION INTRAMUSCULAR; INTRAVENOUS at 20:00

## 2023-10-18 RX ADMIN — METOCLOPRAMIDE 5 MG: 5 INJECTION, SOLUTION INTRAMUSCULAR; INTRAVENOUS at 05:34

## 2023-10-18 RX ADMIN — ACETYLCYSTEINE 3 ML: 200 SOLUTION ORAL; RESPIRATORY (INHALATION) at 07:10

## 2023-10-18 RX ADMIN — PHENYLEPHRINE HYDROCHLORIDE 0.5 MCG/KG/MIN: 10 INJECTION INTRAVENOUS at 15:35

## 2023-10-18 RX ADMIN — MIDAZOLAM 1 MG: 1 INJECTION INTRAMUSCULAR; INTRAVENOUS at 06:28

## 2023-10-18 RX ADMIN — METOCLOPRAMIDE 5 MG: 5 INJECTION, SOLUTION INTRAMUSCULAR; INTRAVENOUS at 12:12

## 2023-10-18 RX ADMIN — HYDROMORPHONE HYDROCHLORIDE 1 MG/HR: 10 INJECTION, SOLUTION INTRAMUSCULAR; INTRAVENOUS; SUBCUTANEOUS at 17:56

## 2023-10-18 RX ADMIN — KETAMINE HYDROCHLORIDE 25 MG: 50 INJECTION INTRAMUSCULAR; INTRAVENOUS at 14:40

## 2023-10-18 RX ADMIN — Medication 0.02 MCG/KG/MIN: at 11:44

## 2023-10-18 RX ADMIN — MIDAZOLAM 1 MG: 1 INJECTION INTRAMUSCULAR; INTRAVENOUS at 05:34

## 2023-10-18 RX ADMIN — KETAMINE HYDROCHLORIDE 0.06 MG/KG/HR: 100 INJECTION, SOLUTION, CONCENTRATE INTRAMUSCULAR; INTRAVENOUS at 14:44

## 2023-10-18 RX ADMIN — KETAMINE HYDROCHLORIDE 0.8 MG/KG/HR: 100 INJECTION, SOLUTION, CONCENTRATE INTRAMUSCULAR; INTRAVENOUS at 21:21

## 2023-10-18 RX ADMIN — BUDESONIDE 0.5 MG: 0.5 INHALANT RESPIRATORY (INHALATION) at 19:01

## 2023-10-18 RX ADMIN — MIDAZOLAM 2 MG: 1 INJECTION INTRAMUSCULAR; INTRAVENOUS at 08:34

## 2023-10-18 NOTE — CONSULTS
Infectious Diseases Consult Note    Referring Provider: Vinicius Heredia DO    Reason for Consultation: Severe hypoxia    Patient Care Team:  Asia Queen APRN as PCP - General (Nurse Practitioner)  Asia Queen APRN as Nurse Practitioner (Nurse Practitioner)  Ling Bolden MD as Consulting Physician (Hematology and Oncology)    Chief complaint intubated on the ventilator    Subjective     History of present illness:    This is 83-year-old female who was hospitalized to [facility] on October 10, 2023 with complaints of right rib pain.  Apparently her condition worsened since admission and required to be transferred to ICU on October 14 and required to be intubated secondary to hypoxia.  The patient has been on 100% FiO2 and currently on 95% FiO2.  Initially she has thick secretions but tracheal aspirate did not grow significant pathogen.  The patient was started on IV ceftriaxone since she was a growing E. coli and Klebsiella pneumoniae in her urine.  The patient is hemodynamically stable but needing a low-dose of pressors secondary to sedation.  The patient was noted to have elevated white count today but mostly lymphocytes and she has a history of CLL.      Review of Systems   Review of Systems   Unable to perform ROS: Intubated       Medications  Medications Prior to Admission   Medication Sig Dispense Refill Last Dose    amLODIPine (NORVASC) 10 MG tablet Take 1 tablet by mouth Daily. 90 tablet 1     anastrozole (ARIMIDEX) 1 MG tablet Take 1 tablet by mouth Daily. 90 tablet 3     cetirizine (zyrTEC) 10 MG tablet Take 1 tablet by mouth every night at bedtime.       chlorthalidone (HYGROTON) 25 MG tablet TAKE 1 TABLET BY MOUTH EVERY DAY 90 tablet 1     docusate sodium (COLACE) 100 MG capsule Take 1 capsule by mouth 2 (Two) Times a Day.  3     ferrous sulfate 324 (65 Fe) MG tablet delayed-release EC tablet Take 1 tablet by mouth 3 (Three) Times a Week.       irbesartan (AVAPRO)  300 MG tablet TAKE 1 TABLET BY MOUTH EVERY DAY 90 tablet 1     levothyroxine (SYNTHROID, LEVOTHROID) 100 MCG tablet One tab po q day 90 tablet 3     metFORMIN (GLUCOPHAGE) 500 MG tablet Take 1 tablet by mouth Daily With Breakfast. 90 tablet 3     metoclopramide (REGLAN) 5 MG tablet Take 1 tablet by mouth 3 (Three) Times a Day As Needed (for nausea and vomiting). 270 tablet 3     mirtazapine (REMERON) 15 MG tablet Take 1 tablet by mouth every night at bedtime. 90 tablet 1     pantoprazole (PROTONIX) 40 MG EC tablet TAKE 1 TABLET BY MOUTH EVERY MORNING BEFORE BREAKFAST. TAKE ON AN EMPTY STOMACH! 90 tablet 1     rOPINIRole (REQUIP) 0.25 MG tablet Take 1 tablet by mouth Every Night. Take 1 hour before bedtime. 90 tablet 3     rosuvastatin (CRESTOR) 10 MG tablet Take 1 tablet by mouth Every Night. 90 tablet 3     sertraline (ZOLOFT) 100 MG tablet Take 1.5 tablets by mouth Daily. 45 tablet 11     Accu-Chek Softclix Lancets lancets 1 each by Other route Daily. Use as instructed 100 each 0     Alcohol Swabs ([employer] Alcohol Prep Pads) 70 % pads APPLY 1 EACH TOPICALLY DAILY.       glucose blood (Accu-Chek Guide) test strip 1 each by Other route Daily. Use as instructed 100 each 0     Hearing Aid Batteries misc 1 Units Every 7 (Seven) Days. 52 each 0     Isopropyl Alcohol (Alcohol Wipes) 70 % misc Apply 1 each topically Daily. 100 each 0        History  Past Medical History:   Diagnosis Date    Acute bronchitis due to human metapneumovirus 02/08/2020    Anxiety disorder     Arthritis     Breast cancer 02/2019    Invasive ductal carcinoma    Chronic lymphocytic leukemia (CLL), B-cell 01/2019    Depression     Disease of thyroid gland     Diverticulosis of colon 2018    Identified on colonoscopy    Dysphagia 12/2020    Dyspnea on exertion     Fall at home 10/11/2023    Hemorrhoid     Hiatal hernia 12/2020    Hiatal hernia with GERD     large hiatal hernia with high-grade reflux on barium swallow; s/p laparoscopic fundoplication  with gastropexy    History of breast cancer 10/11/2023    History of cigarette smoking 10/11/2023    History of diabetes mellitus     no meds now    Hyperlipidemia     Hypertension     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    aspiration pneumonia; completed abx therapy    OAB (overactive bladder)     Personal history of CLL (chronic lymphocytic leukemia) 10/11/2023    Skin cancer     Sleep apnea     daughter states pt does not have and doesn't have machine     Past Surgical History:   Procedure Laterality Date    BLADDER SURGERY      BREAST BIOPSY      BRONCHOSCOPY N/A 09/13/2019    Procedure: BRONCHOSCOPY with bronchial washing;  Surgeon: Marnie Frankel MD;  Location: [facility] ENDOSCOPY;  Service: Pulmonary    COLONOSCOPY  07/19/2018    severe diverticulosis    CYST REMOVAL      Removed a fatty cyst off of her back    ENDOSCOPY N/A 11/23/2020    Procedure: ESOPHAGOGASTRODUODENOSCOPY with dilatation (Bougie # 48, 50, 52, 54, 56, 58);  Surgeon: Den Plummer DO;  Location: [facility] ENDOSCOPY;  Service: General;  Laterality: N/A;  Post: large hiatal hernia, joshua's ulcers    HIATAL HERNIA REPAIR N/A 12/30/2020    Procedure: Laparoscopic hiatal hernia repair with gastropexy;  Surgeon: Den Plummer DO;  Location: [facility] MAIN OR;  Service: General;  Laterality: N/A;    MASTECTOMY Right 02/04/2019    Invasive ductal carcinoma    TUBAL ABDOMINAL LIGATION         Family History  Family History   Problem Relation Age of Onset    Diabetes Mother     Arthritis Other     Heart disease Other        Social History   reports that she quit smoking about 31 years ago. Her smoking use included cigarettes. She started smoking about 33 years ago. She has a 1.00 pack-year smoking history. She has never used smokeless tobacco. She reports that she does not drink alcohol and does not use drugs.    Allergies  Patient has no known allergies.    Objective     Vital Signs   Vital Signs (last 24 hours)         10/17 0700  10/18 0659 10/18  0700  10/18 1334   Most Recent      Temp (°F) 97.8 -  98.6    97.4 -  99     97.4 (36.3) 10/18 1200    Heart Rate 46 -  110    62 -  95     62 10/18 1305    Resp 20 -  26    20 -  25     20 10/18 1300    BP 56/30 -  126/48    78/35 -  130/48     108/43 10/18 0817    SpO2 (%) 86 -  97    94 -  97     96 10/18 1305    Oxygen Concentration (%) 90 -  100       100 10/18 0358            Physical Exam:  Physical Exam  Constitutional:       Comments: Intubated and sedated on the ventilator   HENT:      Head: Normocephalic and atraumatic.      Mouth/Throat:      Mouth: Mucous membranes are dry.   Eyes:      Pupils: Pupils are equal, round, and reactive to light.   Cardiovascular:      Rate and Rhythm: Normal rate and regular rhythm.   Pulmonary:      Breath sounds: Rales present.   Abdominal:      General: Abdomen is flat. Bowel sounds are normal.      Palpations: Abdomen is soft.   Musculoskeletal:      Cervical back: Neck supple.      Right lower leg: No edema.      Left lower leg: No edema.         Microbiology  Microbiology Results (last 10 days)       Procedure Component Value - Date/Time    Respiratory Culture - Sputum, ET Suction [[id number]] Collected: 10/15/23 1354    Lab Status: Final result Specimen: Sputum from ET Suction Updated: 10/18/23 1030     Respiratory Culture Scant growth (1+) Normal respiratory franci. No S. aureus or Pseudomonas aeruginosa detected. Final report.     Gram Stain Moderate (3+) WBCs per low power field      Few (2+) Gram positive cocci in clusters    Blood Culture - Blood, Hand, Left [[id number]]  (Normal) Collected: 10/14/23 1620    Lab Status: Preliminary result Specimen: Blood from Hand, Left Updated: 10/17/23 1700     Blood Culture No growth at 3 days    Urine Culture - Urine, Straight Cath [[id number]]  (Abnormal)  (Susceptibility) Collected: 10/14/23 1342    Lab Status: Final result Specimen: Urine from Straight Cath Updated: 10/17/23 0224     Urine Culture >100,000 CFU/mL Klebsiella  pneumoniae ssp pneumoniae      >100,000 CFU/mL Escherichia coli    Narrative:      Colonization of the urinary tract without infection is common. Treatment is discouraged unless the patient is symptomatic, pregnant, or undergoing an invasive urologic procedure.    Susceptibility        Klebsiella pneumoniae ssp pneumoniae      JUAN      Ampicillin Resistant      Ampicillin + Sulbactam Susceptible      Cefazolin Susceptible      Cefepime Susceptible      Ceftazidime Susceptible      Ceftriaxone Susceptible      Gentamicin Susceptible      Levofloxacin Susceptible      Nitrofurantoin Intermediate      Piperacillin + Tazobactam Susceptible      Trimethoprim + Sulfamethoxazole Susceptible                       Susceptibility        Escherichia coli      JUAN      Ampicillin Susceptible      Ampicillin + Sulbactam Susceptible      Cefazolin Susceptible      Cefepime Susceptible      Ceftazidime Susceptible      Ceftriaxone Susceptible      Gentamicin Susceptible      Levofloxacin Susceptible      Nitrofurantoin Susceptible      Piperacillin + Tazobactam Susceptible      Trimethoprim + Sulfamethoxazole Susceptible                           Respiratory Panel PCR w/COVID-19(SARS-CoV-2) LESLEE/VIVIANE/PAVAN/PAD/COR/MAD/JUAN DAVID In-House, NP Swab in UT/VTM, 3-4 HR TAT - Swab, Nasopharynx [[id number]]  (Abnormal) Collected: 10/10/23 1510    Lab Status: Final result Specimen: Swab from Nasopharynx Updated: 10/10/23 1613     ADENOVIRUS, PCR Not Detected     Coronavirus 229E Not Detected     Coronavirus HKU1 Not Detected     Coronavirus NL63 Not Detected     Coronavirus OC43 Not Detected     COVID19 Not Detected     Human Metapneumovirus Not Detected     Human Rhinovirus/Enterovirus Detected     Influenza A PCR Not Detected     Influenza B PCR Not Detected     Parainfluenza Virus 1 Not Detected     Parainfluenza Virus 2 Not Detected     Parainfluenza Virus 3 Not Detected     Parainfluenza Virus 4 Not Detected     RSV, PCR Not Detected      Bordetella pertussis pcr Not Detected     Bordetella parapertussis PCR Not Detected     Chlamydophila pneumoniae PCR Not Detected     Mycoplasma pneumo by PCR Not Detected    Narrative:      In the setting of a positive respiratory panel with a viral infection PLUS a negative procalcitonin without other underlying concern for bacterial infection, consider observing off antibiotics or discontinuation of antibiotics and continue supportive care. If the respiratory panel is positive for atypical bacterial infection (Bordetella pertussis, Chlamydophila pneumoniae, or Mycoplasma pneumoniae), consider antibiotic de-escalation to target atypical bacterial infection.            Laboratory  Results from last 7 days   Lab Units 10/18/23  0453   WBC 10*3/mm3 30.20*   HEMOGLOBIN g/dL 8.9*   HEMATOCRIT % 28.3*   PLATELETS 10*3/mm3 166     Results from last 7 days   Lab Units 10/18/23  0453   SODIUM mmol/L 138   POTASSIUM mmol/L 3.6   CHLORIDE mmol/L 99   CO2 mmol/L 24.0   BUN mg/dL 61*   CREATININE mg/dL 2.08*   GLUCOSE mg/dL 147*   CALCIUM mg/dL 8.6     Results from last 7 days   Lab Units 10/18/23  0453   SODIUM mmol/L 138   POTASSIUM mmol/L 3.6   CHLORIDE mmol/L 99   CO2 mmol/L 24.0   BUN mg/dL 61*   CREATININE mg/dL 2.08*   GLUCOSE mg/dL 147*   CALCIUM mg/dL 8.6                   Radiology  Imaging Results (Last 72 Hours)       Procedure Component Value Units Date/Time    XR Chest 1 View [[id number]] Collected: 10/18/23 1059     Updated: 10/18/23 1103    Narrative:      XR CHEST 1 VW    Date of Exam: 10/18/2023 10:40 AM EDT    Indication: to confirm placement of ET Tube -Manual Prone Protocol    Comparison: AP portable chest 10/18/2023 at 05 45.    Findings:  Dense retrocardiac left lower lobe airspace disease with small left pleural effusion persists. Mild right basilar atelectasis is again noted and is unchanged. Heart size is normal. ET tube is in satisfactory position in the midthoracic trachea. The tip   is located  approximately 4.5 cm above the jaden. NG tube extends below the diaphragm with the tip not included in the field-of-view. There is no visible pneumothorax.      Impression:      Impression:    1. ET tube is in satisfactory position in the midthoracic trachea.  2.Persistent dense retrocardiac left lower lobe atelectasis or pneumonia and mild right basilar atelectasis  3. Stable small left pleural effusion      Electronically Signed: Marta Carrillo MD    10/18/2023 11:01 AM EDT    Workstation ID: [id number]    XR Chest 1 View [[id number]] Collected: 10/18/23 0708     Updated: 10/18/23 0712    Narrative:      XR CHEST 1 VW    Date of Exam: 10/18/2023 5:35 AM EDT    Indication: F/U resp failure    Comparison: 10/17/2023    Findings:  ET and NG tube are in satisfactory position. There is pulmonary vascular congestion and pulmonary edema with bibasilar airspace opacities and small bilateral pleural effusions. Overall appearance of the chest is similar compared to prior exam. Cardiac   mediastinal contours are within normal limits.      Impression:      Impression:    1. Pulmonary vascular congestion and mild pulmonary edema with small bilateral pleural effusions and bibasilar atelectasis. Overall similar compared to prior.  2. NG tube and ET tube in satisfactory position.      Electronically Signed: Levon Berumen MD    10/18/2023 7:10 AM EDT    Workstation ID: [id number]    XR Chest 1 View [[id number]] Collected: 10/17/23 1800     Updated: 10/17/23 1804    Narrative:      XR CHEST 1 VW    Date of Exam: 10/17/2023 5:19 PM EDT    Indication: increased respiratory status    Comparison: 10/16/2023    Findings:  ET tube is present 4 cm of the jaden. There is an esophagogastric tube courses through midline below the diaphragm, tip not seen. There is elevation of the right hemidiaphragm. There is a small right pleural effusion and trace left pleural effusion.   These. Increased from prior. Cardiomegaly is noted. There is mild  pulmonary vascular congestion without definite findings to suggest CHF. No pneumothorax identified. Aortic calcification is noted.      Impression:      Impression:    1. Small right pleural effusion and trace left pleural effusion, increased from prior.  2. Cardiomegaly with mild pulmonary vascular congestion.  3. Tubes and lines as above.      Electronically Signed: David Chester MD    10/17/2023 6:02 PM EDT    Workstation ID: [id number]    XR Chest 1 View [[id number]] Collected: 10/16/23 1059     Updated: 10/16/23 1110    Narrative:      XR CHEST 1 VW    Date of Exam: 10/16/2023 10:53 AM EDT    Indication: Confirm ETT placement    Comparison: 10/16/2023 at 4:29 a.m.    Findings:  There is an ET tube in place 2.5 cm above the jaden. An NG tube is in the stomach. There is continued partial opacification of the left lower lobe by infiltrate and probable effusion. No definite new abnormalities identified.      Impression:      Impression: No significant change from prior.      Electronically Signed: Rosa Melendez MD    10/16/2023 11:01 AM EDT    Workstation ID: [id number]    US Renal Bilateral [[id number]] Collected: 10/16/23 0708     Updated: 10/16/23 0712    Narrative:      US RENAL BILATERAL    Date of Exam: 10/16/2023 5:17 AM EDT    Indication: LEXUS/CKD.    Comparison: No comparisons available.    Technique: Grayscale and color Doppler ultrasound evaluation of the kidneys and urinary bladder was performed.      Findings:  The right kidney measures 10.3 x 5.2 x 4.6 cm. The left kidney measures 9.6 x 5 x 4.4 cm. No hydronephrosis. Corticomedullary differentiation maintained. There is no debris in the bladder. Small cyst at the mid left kidney measures 1.3 cm.      Impression:      Impression:  No hydronephrosis or acute sonographic abnormality.        Electronically Signed: Pavan Amezquita MD    10/16/2023 7:09 AM EDT    Workstation ID: [id number]    XR Chest 1 View [[id number]] Collected: 10/16/23 0642     Updated: 10/16/23  0646    Narrative:      XR CHEST 1 VW    Date of Exam: 10/16/2023 4:20 AM EDT    Indication: resp railure, intubated    Comparison: 10/15/2023    Findings:  ET tube has been retracted with the tip now 2.5 cm above the jaden. Esophagogastric tube tip below the diaphragm. Lungs are hypoinflated. Unchanged left basilar airspace disease and small left pleural effusion. Negative for pneumothorax.      Impression:      Impression:  1. Retraction of ET tube with tip now 2.5 cm above the jaden.  2. Esophagogastric tube tip below the diaphragm.  3. Hypoinflated lungs with unchanged left basilar airspace disease and small left pleural effusion.        Electronically Signed: Pavan Amezquita MD    10/16/2023 6:44 AM EDT    Workstation ID: [id number]    XR Abdomen KUB [[id number]] Collected: 10/15/23 2049     Updated: 10/15/23 2051    Narrative:      XR ABDOMEN KUB    Date of Exam: 10/15/2023 8:35 PM EDT    Indication: og plcmt    Comparison: 10/15/2023.    Findings:  Enteric tube present within the stomach. No dilated loops of bowel identified.      Impression:      Impression:  Enteric tube within the stomach.        Electronically Signed: Lucy Townsend MD    10/15/2023 8:49 PM EDT    Workstation ID: [id number]    XR Abdomen KUB [[id number]] Collected: 10/15/23 1502     Updated: 10/15/23 1505    Narrative:      XR ABDOMEN KUB    Date of Exam: 10/15/2023 2:32 PM EDT    Indication: NG placement    Comparison: KUB dated 10/14/2023    Findings:  The endotracheal tube tip terminates at the right mainstem bronchus. A gastric suction tube is not visualized.      Impression:      Impression:  1. Nonvisualization of the gastric suction tube. The tube was coiled within the neck on the concurrent chest radiograph and was subsequently removed.  2. Endotracheal tube coursing towards the right mainstem. This was retracted after radiograph per patient's nurse.      Electronically Signed: Antony Lopez    10/15/2023 3:03 PM EDT    Workstation ID:  [id number]    XR Chest 1 View [[id number]] Collected: 10/15/23 1455     Updated: 10/15/23 1504    Narrative:      XR CHEST 1 VW    Date of Exam: 10/15/2023 2:32 PM EDT    Indication: ETT placement    Comparison: Chest radiograph dated 10/15/2023    Findings:  The endotracheal tube tip is at the jaden, slightly deviating towards the right mainstem. The gastric suction tube is coiled within the neck. Cardiomediastinal silhouette is within normal limits for there is aortic arch atherosclerotic calcification.   There is a small left-sided pleural effusion and left basilar airspace disease. There is mild elevation of the right hemidiaphragm. There are degenerative changes of the thoracic spine.      Impression:      Impression:  1. Endotracheal tube tip terminating at the jaden slightly deviating to the right of the midline. This was discussed with patient's nurse and they reported that the tube had been retracted after radiograph.  2. Gastric suction tube coiled within the neck and had been removed after radiograph.  3. Small left-sided pleural effusion and left basilar airspace disease.    Findings discussed with patient's nurse Kyle Mcclellan at 2:57 p.m. on 10/15/2023      Electronically Signed: Antony Lopez    10/15/2023 3:02 PM EDT    Workstation ID: [id number]            Cardiology      Results Review:  I have reviewed all clinical data, test, lab, and imaging results.       Schedule Meds  acetylcysteine, 3 mL, Nebulization, TID  artificial tears, , Both Eyes, Q4H  budesonide, 0.5 mg, Nebulization, Q12H  cisatracurium, 100 mcg/kg, Intravenous, Once  enoxaparin, 30 mg, Subcutaneous, Daily  ferrous sulfate, 300 mg, Nasogastric, Once per day on Mon Wed Fri  guaiFENesin, 400 mg, Nasogastric, Q8H  insulin lispro, 2-7 Units, Subcutaneous, Q6H  ipratropium-albuterol, 3 mL, Nebulization, 4x Daily - RT  lansoprazole, 30 mg, Nasogastric, Q AM  levothyroxine, 100 mcg, Nasogastric, Q AM  metoclopramide, 5 mg, Intravenous,  Q8H  midodrine, 10 mg, Nasogastric, Q8H  piperacillin-tazobactam, 3.375 g, Intravenous, Once  piperacillin-tazobactam, 3.375 g, Intravenous, Q8H  polyethylene glycol, 17 g, Nasogastric, BID  rosuvastatin, 10 mg, Nasogastric, Nightly  senna-docusate sodium, 1 tablet, Nasogastric, BID  sertraline, 150 mg, Nasogastric, Daily  sodium chloride, 10 mL, Intravenous, Q12H  sodium chloride, 10 mL, Intravenous, Q12H        Infusion Meds  bumetanide, 1 mg/hr, Last Rate: 1 mg/hr (10/18/23 1239)  cisatracurium (NIMBEX) 200 mg in sodium chloride 0.9 % 100 mL, 0.5-10 mcg/kg/min  fentanyl 10 mcg/mL,  mcg/hr, Last Rate: 175 mcg/hr (10/18/23 1236)  Midazolam 1 mg/mL 100mL NS, 1-10 mg/hr, Last Rate: 6 mg/hr (10/18/23 1235)  norepinephrine, 0.02-0.5 mcg/kg/min, Last Rate: 0.02 mcg/kg/min (10/18/23 1144)  propofol, 5-50 mcg/kg/min, Last Rate: Stopped (10/17/23 1705)        PRN Meds    acetaminophen    artificial tears    senna-docusate sodium **AND** polyethylene glycol **AND** bisacodyl **AND** bisacodyl    cyclobenzaprine    dextrose    dextrose    dextrose    fentaNYL citrate (PF)    glucagon (human recombinant)    hydrALAZINE    ipratropium-albuterol    midazolam    nitroglycerin    ondansetron    oxyCODONE    [COMPLETED] Insert Peripheral IV **AND** sodium chloride    sodium chloride    sodium chloride    sodium chloride    sodium chloride      Assessment & Plan       Assessment    Acute respiratory failure.  Etiology is not very clear but there is a possibility of hospital-acquired pneumonia.  Possibility of atypical infection such as PCP pneumonia is also in the differential diagnosis  The patient was intubated on October 14, 2023 and currently on 95% FiO2 and 10 PEEP    Severe lymphocytosis.  Most likely secondary to underlying CLL    History of breast cancer s/p mastectomies in the past    UTI with Klebsiella pneumoniae and E. coli.  Organisms were relatively susceptible    Diabetes mellitus type 2    Rhinovirus  infection.  Nasal swab was positive for rhinovirus PCR    Plan    Discontinue IV ceftriaxone  Start Zosyn 3.375 g every 8 hours  Case was discussed with ICU service and recommend bronchoscopy and send BAL for routine studies and PCP PCR  CT scan of the chest was ordered by ICU service.  If CT scan shows interstitial infiltrates then I will start patient empirically on PCP pneumonia coverage  Continue supportive care  A.m. labs  Droplet isolation for rhinovirus infection for total of 7 days    Damian Santos MD  10/18/23  13:34 EDT    Note is dictated utilizing voice recognition software/Dragon

## 2023-10-18 NOTE — PROCEDURES
"Bronchoscopy    Date/Time: 10/18/2023 3:08 PM    Performed by: Vinicius Heredia DO  Authorized by: Vinicius Heredia DO  Consent: Verbal consent not obtained. Written consent obtained.  Risks and benefits: risks, benefits and alternatives were discussed  Consent given by: Daughter.  Procedure consent: procedure consent matches procedure scheduled  Relevant documents: relevant documents present and verified  Patient identity confirmed: arm band  Time out: Immediately prior to procedure a \"time out\" was called to verify the correct patient, procedure, equipment, support staff and site/side marked as required.  Local anesthesia used: no    Anesthesia:  Local anesthesia used: no    Sedation:  Patient sedated: Pt already on sedation for vent comfort.    Patient tolerance: patient tolerated the procedure well with no immediate complications      After proper informed consent was obtained and timeout performed, patient was placed supine and FiO2 on ventilator was increased to 100%, where it stayed throughout the procedure.  Bronchoscope was then advanced through the ET tube and into the main airway.  The jaden appeared sharp.  Main airway was free of secretions.  Mucosa appeared normal.  Scope was then advanced into the left mainstem bronchus and straight down to the left lower lobe bronchus.  There was noted to be several thick, white mucous plugs which were suctioned, washed, and resuctioned.  The aspirated liquid appeared grayish-brown.  He required several washings to clean the mucous out of the left lower lobe.  Left upper lobe and lingular segment were also noted to have some secretions but not nearly as bad as the left lower.  Mucosa throughout the left lung appeared normal, no masses were seen.  There was significant dynamic and expiratory airway collapse and with suctioning noted.  Scope was withdrawn above the jaden and advanced into the right mainstem bronchus, followed by advancement into the right " upper lobe followed by the right middle lobe, followed by the right lower lobe.  All segments were visualized and noted to be free of masses.  There were small mucous plugs seen in all segments which were suctioned and washed, then resuctioned.  Once as much mucus was removed as possible, the procedure was terminated and scope was withdrawn from the ET tube.  Patient tolerated procedure well without any acute postprocedural complications.  Suction specimen was sent for bacterial and fungal cultures, as well as PJP PCR.  Procedure time was not included in today's note.

## 2023-10-18 NOTE — CONSULTS
Nutrition Services    Patient Name: Diane Guan  YOB: 1940  MRN: [id number]  Admission date: 10/10/2023    PPE Documentation        PPE Worn By Provider Did not enter room for this encounter   PPE Worn By Patient  N/A     PROGRESS NOTE      Encounter Information: Check on for tube feeding.  Patient discussed in AM rounds.  MD desires to prone patient, ok with trickle TF while prone.  MD to add versed and nimbex.  Possible CRRT tomorrow per Nephrology note.         PO Diet: NPO Diet NPO Type: Strict NPO   PO Supplements: None ordered    PO Intake:  NPO       Current nutrition support: Peptamen AF at 30 mL/hour + 30 mL/hour water flush    Nutrition support review: Documented as above per EMR        Labs (reviewed below): Elevated BUN/Cr        GI Function:  Last documented BM 10/17 (yesterday)   Residuals WNL       Nutrition Intervention Updates: While prone: Peptamen AF at 10 mL/hour + 10 mL/hour water flush    While supine: Peptaman AF at 60 mL/hour + 10 mL/hour water flush        Results from last 7 days   Lab Units 10/18/23  0453 10/17/23  0424 10/16/23  0429   SODIUM mmol/L 138 143 143   POTASSIUM mmol/L 3.6 3.7 4.2   CHLORIDE mmol/L 99 103 106   CO2 mmol/L 24.0 24.0 23.0   BUN mg/dL 61* 65* 60*   CREATININE mg/dL 2.08* 1.54* 1.48*   CALCIUM mg/dL 8.6 9.3 9.4   BILIRUBIN mg/dL 0.2 0.3  --    ALK PHOS U/L 97 110  --    ALT (SGPT) U/L 11 9  --    AST (SGOT) U/L 23 11  --    GLUCOSE mg/dL 147* 183* 131*     Results from last 7 days   Lab Units 10/18/23  0453 10/17/23  0424 10/16/23  0429 10/15/23  0616   MAGNESIUM mg/dL 2.0 1.7  --  1.9   PHOSPHORUS mg/dL 4.2 3.9  --  3.9   HEMOGLOBIN g/dL 8.9* 10.3*   < > 9.9*   HEMATOCRIT % 28.3* 33.8*   < > 32.8*    < > = values in this interval not displayed.     COVID19   Date Value Ref Range Status   10/10/2023 Not Detected Not Detected - Ref. Range Final     Lab Results   Component Value Date    HGBA1C 6.50 (H) 10/13/2023       RD to follow up per  protocol.    Electronically signed by:  Vernell Gil RD  10/18/23 09:50 EDT

## 2023-10-18 NOTE — NURSING NOTE
Pt remains on ventilator at 100% and 10 PEEP. Pt has been restless and agitated majority of the night. Unable to increase pain medication or sedation due to hypotension. 22 in the left shoulder was placed this shift due to loss of one IV. Pt has tolerateed tube feeds well. Only 150 UOP on this shift, no bowel movement. VSS at this time, continuing to monitor.

## 2023-10-18 NOTE — CONSULTS
PICC Line Insertion Procedure Note    Procedure: Insertion of #5 FR/16G PICC    Indications:  Poor Access, multiple meds, pressors    Active Time Out:  Correct patient: Yes  Correct procedure: Yes  Correct site: Yes  Verified with: primary rn    Procedure Details:  Informed consent was obtained for the procedure.  Risk include, but are not limited to infection, air embolism, catheter tip moving, catheter blockage and phlebitis.     Maximum sterile technique was used including antiseptics, cap, gloves, gown, hand hygiene, mask, and sheet.    Ultrasound Guidance: Yes    #5 FR/16G PICC inserted to the L Basilic vein per hospital protocol by bienvenido easley RN.   Non-pulsatile blood return: yes    Lot #: [id number]  Expiration date: 01/31/2025    Complications:  none    Findings:  Catheter inserted to 35 cm, with 0 cm exposed.   Mid upper arm circumference is 34 cm.   Catheter was flushed with 20 cc NS and sterile dressing applied.  Patient tolerated procedure well.  PICC tip verified by:       [x] Sapiens 3cg       [] Chest X-ray    Recommendations:  Verbal and/or written Care/Maintenance instructions provided to patient.   Primary nurse notified.    Cb Mercedes RN  10/18/23  12:02 EDTArterial Catheter Insertion Procedure Note      Indications: blood pressure monitoring/ABGs    Active Time Out:  Correct patient: Yes  Correct procedure: Yes  Correct site: Yes  Verified with: primary rn    Procedure Details:   Informed consent was obtained for the procedure.  Maximum sterile technique was used including usual patient drapes, antiseptics and sterile garments.    Collateral arterial circulation to hand confirmed through Sai's test. Under sterile conditions and utilizing ultrasound guidance, the skin above the radial artery  was prepped with chlorhexidine and covered with a sterile drape. Local anesthesia was applied to the skin and subcutaneous tissues with lidocaine 1%. An 20-gauge needle was then inserted into the  artery. Bright red pulsating blood return noted. A guide wire was then passed easily through the catheter. An angiocatheter was then inserted into the vessel over the guide wire. The catheter was secured in place and dressed following sterile protocol.    Line placed by bienvenido easley RN.    Complications:   none    Findings:  Patient  tolerated procedure well. Primary nurse notified.     Recommendations:  Line will be used for invasive hemodynamic monitoring and blood draws for ABG's.    Cb Mercedes RN  10/18/2023

## 2023-10-18 NOTE — PROGRESS NOTES
Critical Care Progress Note   Diane Guan : 1940 MRN:[id number] LOS:6     Principal Problem: Acute hypoxemic respiratory failure     Reason for follow up: All the medical problems listed below    Summary     The following information has been supplemented by review of documentation, collaboration with other providers, and interview with the patient's daughter at bedside as the patient is confused at the time of examination.  The patient presented to the emergency department on 10/10/2023 for evaluation of right chest wall pain following a fall at home.  The patient's daughter reports that the patient lost her balance and fell when she got up in the dark to go to the restroom.  She apparently had preached for the light switch at which time stumbled and fell with her right side hitting the elevated toilet seat and then fell to her right side striking the floor.  There was no report of LOC and following the fall the patient was not confused.  The patient's daughter also reports that the patient had complained of a cough productive of clear secretions approximately 3 weeks ago.  On  the patient was evaluated at an urgent care facility where she was prescribed a Z-Erick however had no improvement.     On evaluation in the emergency department the patient was found to have hypoxic/hypercapnic respiratory failure which responded well to O2 therapy.  Her home dose diuretics were continued.  She was admitted for further evaluation and treatment to the hospitalist.  The patient's daughter states that at approximately 5 PM yesterday evening the patient developed worsening confusion and hallucinations.  Overnight the patient developed worsening O2 needs and hypercapnia requiring increased supplementation to BiPAP therapy.    ACP: No ACP documentation on file.  Patient is currently confused however her adult daughter is at bedside and the patient indicates that decisions can be made by her.     Significant events      10/18/23 : Pt now requiring 95% FiO2.  Sputum Cx only with normal franci.  Had only 600 mL UOP last 24h, net -200 mL.  Cr up to 2.08.  WBC up significantly to 30k today, 7% bandemia.  Hb dropped from 10.3 to 9.  Getting CT C/A/P without today.  Hb stable. Repeat PCT.  Check MRSA screen.  Repeat Bcx's.  Will consider bronch depending on CT findings.  Change sedation to Versed and fentanyl gtt's.  Start Nimbex.  Will consider proning.  D/w pt's sister at bedside.    Assessment / Plan     Acute respiratory failure with hypoxia and hypercapnia  Multifactorial with AE COPD, volume overload with small left pleural effusion, splinting due to pain with inspiration   Septic shock  Acute cystitis w/o hematuria--E coli & K pneumo  Likely due to fluid volume overload and mild/acute exacerbation COPD  BiPAP settings noted and adjusted as needed.  Airvo as tolerated  Close monitoring of ABG as clinically indicated.   Avoid sedating medications  Scheduled Symbicort, as needed DuoNebs  Procalcitonin normal  Echocardiogram 1/6/2021, EF 65%  Repeat echocardiogram pending  Continue chlorthalidone per home dose.  Further diuresis per nephrology  Diuresis per nephrology  Encourage I-S and flutter while awake  Pt intubated and on mechanical ventilation as of 10/15  Precedex and fentanyl for vent comfort.  Wean FiO2 as tolerates.  On Rocephin       Diabetes melitis type II  Hold home dose metformin due to LEXUS  Accu-Cheks before meals and at bedtime with SSI for tight glycemic control  Hemoglobin A1c pending       Acute metabolic encephalopathy  Likely metabolic secondary to hypercapnia and hypoxia  Patient has not returned to their baseline at this time.    Urinalysis with reflex culture pending  TSH/free T4 pending  CT head pending       Essential hypertension  Continue home dose Norvasc  Titrate meds as needed       Acute kidney injury on CKD stage III, baseline creatinine 1.3  Acute urinary retention  Hyperkalemia  Remains non  oliguric.   Likely prerenal. Possible intrinsic component due to ATN from infection, drugs and hypotension.   Nephrology following  Renal ultrasound from 10/16 was unremarkable  Monitor Input/Output very closely.   Place Bellamy catheter for now to relieve urinary retention and close I/O monitoring  Started on Bumex gtt on 10/18       Diabetes mellitus type 2  Hold home medications due to LEXUS  SSI for tight glycemic control  Hemoglobin A1c 6.5%       Constipation with abdominal discomfort  KUB negative for obstructive pattern, moderate stool burden  Schedule bowel regimen  Dulcolax suppository x1 now, add enema if nonproductive  Continue Reglan, EKG in a.m.     Chronic and stable  Hypothyroidism--continue levothyroxine.  TSH/free T4 pending  Hyperlipidemia--continue statin therapy  Breast cancer, s/p right mastectomy.  Anastrozole since 2019--continue  CLL, leukocytosis at baseline  Mycobacterium AVM complex, s/p antibiotic regimen  Falls at home/functional decline      Critical Care Time:  38 mins.      Code status:   Level Of Support Discussed With: Patient  Code Status (Patient has no pulse and is not breathing): CPR (Attempt to Resuscitate)  Medical Interventions (Patient has pulse or is breathing): Full Support       Nutrition: NPO Diet NPO Type: Strict NPO   Diet, Tube Feeding Tube Feeding Formula: Peptamen AF (Vital AF 1.2); Tube Feeding Type: Continuous; Continuous Tube Feeding Start Rate (mL/hr): 10; Then Advance Rate By (mL/hr): Do Not Advance; Every __ Hours: Patient at Goal Rate; To Goal Rate of...    DVT prophylaxis:  Medical and mechanical DVT prophylaxis orders are present.     Subjective / Review of systems     Review of Systems   Unable to be obtained 2/2 pt's current condition.  Objective / Physical Exam   Vital signs:  Temp: 97.4 °F (36.3 °C)  BP: 108/43  Heart Rate: 78  Resp: 20  SpO2: 95 %  Weight: 83.2 kg (183 lb 6.8 oz)    Admission Weight: Weight: 78.5 kg (173 lb)  Current Weight: Weight: 83.2  kg (183 lb 6.8 oz)    Input/Output in last 24 hours:    Intake/Output Summary (Last 24 hours) at 10/18/2023 1212  Last data filed at 10/18/2023 0550  Gross per 24 hour   Intake 1407.09 ml   Output 600 ml   Net 807.09 ml      Physical Exam     GEN:  Elderly woman, intubated, minimally sedated, agitated, on vent.  NEURO:  Brainstem reflexes intact.  No obvious focal deficit.  Moves all 4 ext.  HEENT:  N/AT.  PERRL.  MMM.  Oropharynx non-erythematous.  No drainage from the eyes/ears/nose.  No conjunctival petechiae.  No oral thrush. ETT in place.  NECK:  Supple, NT, trachea midline.  No meningismus.    CHEST/LUNGS:  Breath sounds are diminished but clear.  Chest excursion equal bilaterally.    CARDIOVASCULAR:  RRR w/o murmur noted.  GI:  Abdomen soft, NT, ND, +BS.   :  External urinary catheter in place.  EXTREMITIES:  No deformity or amputation.  No cyanosis, trace edema, no asymmetry.  Pulses 2+ and equal in BLE's.    SKIN:  Warm, dry, and pink.  No rash, breakdown, or track marks noted.  LYMPHATICS/HEME:  No overt LAD or abnormal bruising.  No lymphedema.  MSK:  Normal ROM.  No joint abnormalities noted.  Strength is 5/5 and equal in BUE and BLE's.  PSYCH:  Unable to be obtained 2/2 pt's current condition.          Radiology and Labs     Results from last 7 days   Lab Units 10/18/23  0453 10/17/23  0424 10/16/23  0429 10/15/23  0616 10/14/23  0753 10/13/23  2356   WBC 10*3/mm3 30.20* 14.40* 17.40* 16.10*  --  28.80*   HEMATOCRIT % 28.3* 33.8* 33.3* 32.8*  --  37.6   HEMATOCRIT POC %  --   --   --   --  37*  --    PLATELETS 10*3/mm3 166 122* 131* 131*  --  148      Results from last 7 days   Lab Units 10/18/23  0453 10/17/23  0424 10/16/23  0429 10/15/23  0616 10/14/23  1620 10/14/23  0425   SODIUM mmol/L 138 143 143 142  --  139   POTASSIUM mmol/L 3.6 3.7 4.2 5.0 5.2 6.0*   CHLORIDE mmol/L 99 103 106 107  --  104   CO2 mmol/L 24.0 24.0 23.0 25.0  --  22.0   BUN mg/dL 61* 65* 60* 52*  --  49*   CREATININE mg/dL  2.08* 1.54* 1.48* 1.43*  --  1.55*      Current medications   Scheduled Meds: acetylcysteine, 3 mL, Nebulization, TID  artificial tears, , Both Eyes, Q4H  budesonide, 0.5 mg, Nebulization, Q12H  calcium gluconate, 1,000 mg, Intravenous, Once  cefTRIAXone, 2,000 mg, Intravenous, Q24H  cisatracurium, 100 mcg/kg, Intravenous, Once  enoxaparin, 30 mg, Subcutaneous, Daily  ferrous sulfate, 300 mg, Nasogastric, Once per day on Mon Wed Fri  guaiFENesin, 400 mg, Nasogastric, Q8H  insulin lispro, 2-7 Units, Subcutaneous, Q6H  ipratropium-albuterol, 3 mL, Nebulization, 4x Daily - RT  lansoprazole, 30 mg, Nasogastric, Q AM  levothyroxine, 100 mcg, Nasogastric, Q AM  metoclopramide, 5 mg, Intravenous, Q8H  midodrine, 10 mg, Nasogastric, Q8H  polyethylene glycol, 17 g, Nasogastric, BID  rosuvastatin, 10 mg, Nasogastric, Nightly  senna-docusate sodium, 1 tablet, Nasogastric, BID  sertraline, 150 mg, Nasogastric, Daily  sodium chloride, 10 mL, Intravenous, Q12H  sodium chloride, 10 mL, Intravenous, Q12H      Continuous Infusions: bumetanide, 1 mg/hr  cisatracurium (NIMBEX) 200 mg in sodium chloride 0.9 % 100 mL, 0.5-10 mcg/kg/min  fentanyl 10 mcg/mL,  mcg/hr, Last Rate: 150 mcg/hr (10/18/23 1143)  Midazolam 1 mg/mL 100mL NS, 1-10 mg/hr, Last Rate: 5 mg/hr (10/18/23 1148)  norepinephrine, 0.02-0.5 mcg/kg/min, Last Rate: 0.02 mcg/kg/min (10/18/23 1144)  propofol, 5-50 mcg/kg/min, Last Rate: Stopped (10/17/23 1705)        Plan discussed with RN. Reviewed all other data in the last 24 hours, including but not limited to vitals, labs, microbiology, imaging and pertinent notes from other providers.     Vinicius Heredia, DO   Critical Care  10/18/23   12:12 EDT

## 2023-10-18 NOTE — PROGRESS NOTES
"RENAL/[facility]:     LOS: 6 days    Patient Care Team:  Asia Queen APRN as PCP - General (Nurse Practitioner)  Asia Queen APRN as Nurse Practitioner (Nurse Practitioner)  Ling Bolden MD as Consulting Physician (Hematology and Oncology)    Chief Complaint:  Acute resp failure    Subjective     Interval History:   Chart reviewed  Remains on the vent on 100% FiO2  Only 600 cc UOP charted  Discussed with family and nurse      Objective     Vital Sign Min/Max for last 24 hours  Temp  Min: 97.8 °F (36.6 °C)  Max: 99 °F (37.2 °C)   BP  Min: 56/30  Max: 130/48   Pulse  Min: 46  Max: 110   Resp  Min: 20  Max: 26   SpO2  Min: 86 %  Max: 97 %   No data recorded   Weight  Min: 83.2 kg (183 lb 6.8 oz)  Max: 83.2 kg (183 lb 6.8 oz)     Flowsheet Rows      Flowsheet Row First Filed Value   Admission Height 162.6 cm (64\") Documented at 10/10/2023 1252   Admission Weight 78.5 kg (173 lb) Documented at 10/10/2023 1252            No intake/output data recorded.  I/O last 3 completed shifts:  In: 3467.1 [I.V.:2598.1; Other:421; NG/GT:448]  Out: 1350 [Urine:1350]    Physical Exam:  GEN: Intubated, sedated  ENT: +ETT  NECK: Supple, no JVD  CHEST: Lungs are clear  CV: RRR, no M/G/R, no peripheral edema  ABD: Soft, NT, +BS  SKIN: Warm and Dry  NEURO: Sedated      WBC WBC   Date Value Ref Range Status   10/18/2023 30.20 (C) 3.40 - 10.80 10*3/mm3 Final   10/17/2023 14.40 (H) 3.40 - 10.80 10*3/mm3 Final   10/16/2023 17.40 (H) 3.40 - 10.80 10*3/mm3 Final      HGB Hemoglobin   Date Value Ref Range Status   10/18/2023 8.9 (L) 12.0 - 15.9 g/dL Final   10/17/2023 10.3 (L) 12.0 - 15.9 g/dL Final   10/16/2023 10.3 (L) 12.0 - 15.9 g/dL Final      HCT Hematocrit   Date Value Ref Range Status   10/18/2023 28.3 (L) 34.0 - 46.6 % Final   10/17/2023 33.8 (L) 34.0 - 46.6 % Final   10/16/2023 33.3 (L) 34.0 - 46.6 % Final      Platlets No results found for: \"LABPLAT\"   MCV MCV   Date Value Ref Range Status   10/18/2023 87.2 79.0 - 97.0 fL " "Final   10/17/2023 89.4 79.0 - 97.0 fL Final   10/16/2023 89.4 79.0 - 97.0 fL Final          Sodium Sodium   Date Value Ref Range Status   10/18/2023 138 136 - 145 mmol/L Final   10/17/2023 143 136 - 145 mmol/L Final   10/16/2023 143 136 - 145 mmol/L Final      Potassium Potassium   Date Value Ref Range Status   10/18/2023 3.6 3.5 - 5.2 mmol/L Final   10/17/2023 3.7 3.5 - 5.2 mmol/L Final   10/16/2023 4.2 3.5 - 5.2 mmol/L Final      Chloride Chloride   Date Value Ref Range Status   10/18/2023 99 98 - 107 mmol/L Final   10/17/2023 103 98 - 107 mmol/L Final   10/16/2023 106 98 - 107 mmol/L Final      CO2 CO2   Date Value Ref Range Status   10/18/2023 24.0 22.0 - 29.0 mmol/L Final   10/17/2023 24.0 22.0 - 29.0 mmol/L Final   10/16/2023 23.0 22.0 - 29.0 mmol/L Final      BUN BUN   Date Value Ref Range Status   10/18/2023 61 (H) 8 - 23 mg/dL Final   10/17/2023 65 (H) 8 - 23 mg/dL Final   10/16/2023 60 (H) 8 - 23 mg/dL Final      Creatinine Creatinine   Date Value Ref Range Status   10/18/2023 2.08 (H) 0.57 - 1.00 mg/dL Final   10/17/2023 1.54 (H) 0.57 - 1.00 mg/dL Final   10/16/2023 1.48 (H) 0.57 - 1.00 mg/dL Final      Calcium Calcium   Date Value Ref Range Status   10/18/2023 8.6 8.6 - 10.5 mg/dL Final   10/17/2023 9.3 8.6 - 10.5 mg/dL Final   10/16/2023 9.4 8.6 - 10.5 mg/dL Final      PO4 No results found for: \"CAPO4\"   Albumin Albumin   Date Value Ref Range Status   10/18/2023 3.0 (L) 3.5 - 5.2 g/dL Final   10/17/2023 3.3 (L) 3.5 - 5.2 g/dL Final      Magnesium Magnesium   Date Value Ref Range Status   10/18/2023 2.0 1.6 - 2.4 mg/dL Final   10/17/2023 1.7 1.6 - 2.4 mg/dL Final      Uric Acid No results found for: \"URICACID\"        Results Review:     I reviewed the patient's new clinical results.    acetylcysteine, 3 mL, Nebulization, TID  budesonide, 0.5 mg, Nebulization, Q12H  cefTRIAXone, 2,000 mg, Intravenous, Q24H  enoxaparin, 30 mg, Subcutaneous, Daily  ferrous sulfate, 300 mg, Nasogastric, Once per day on Mon " Wed Fri  guaiFENesin, 400 mg, Nasogastric, Q8H  insulin lispro, 2-7 Units, Subcutaneous, Q6H  ipratropium-albuterol, 3 mL, Nebulization, 4x Daily - RT  lansoprazole, 30 mg, Nasogastric, Q AM  levothyroxine, 100 mcg, Nasogastric, Q AM  metoclopramide, 5 mg, Intravenous, Q8H  midodrine, 10 mg, Oral, Q8H  polyethylene glycol, 17 g, Nasogastric, BID  rosuvastatin, 10 mg, Nasogastric, Nightly  senna-docusate sodium, 1 tablet, Nasogastric, BID  sertraline, 150 mg, Nasogastric, Daily  sodium chloride, 10 mL, Intravenous, Q12H  sodium chloride, 10 mL, Intravenous, Q12H      bumetanide, 1 mg/hr  fentanyl 10 mcg/mL,  mcg/hr, Last Rate: 125 mcg/hr (10/18/23 0805)  norepinephrine, 0.02-0.5 mcg/kg/min  propofol, 5-50 mcg/kg/min, Last Rate: Stopped (10/17/23 1705)        Medication Review: Reviewed    Assessment & Plan     LEXUS/CKD3  Hyperkalemia  Hypocalcemia  Pulmonary edema  Hypotension    Acute hypoxemic respiratory failure    Mixed anxiety and depressive disorder    Primary osteoarthritis involving multiple joints    Hiatal hernia with gastroesophageal reflux    Mixed hyperlipidemia    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    Acquired hypothyroidism    Type 2 diabetes mellitus with stage 3b chronic kidney disease, without long-term current use of insulin    Insomnia    Overactive bladder    Vitamin B 12 deficiency    Vitamin D deficiency    LIBBY (obstructive sleep apnea)    COPD (chronic obstructive pulmonary disease)    CKD (chronic kidney disease) stage 3, GFR 30-59 ml/min    History of breast cancer    Personal history of CLL (chronic lymphocytic leukemia)    History of cigarette smoking    Fall at home    Rhinovirus infection    Acute respiratory failure    Hyperkalemia    Respiratory failure    Pleural effusion    Plan: Cr bumped to 2 with lower UOP and continued pulmonary edema.  Will try Bumex drip today but discussed with family the possible need for CRRT/SCUF tomorrow for volume removal if she  doesn't respond to increased diuresis.  Add scheduled Midodrine.  FC in place for urinary retention.  Replace Ca.  Vent per Pulm.  Will follow.    35 min CCT spent in eval/management of this critically ill patient    Frederick Bonds MD  [facility]  10/18/23  09:06 EDT

## 2023-10-18 NOTE — SIGNIFICANT NOTE
10/18/23 0733   OTHER   Discipline occupational therapist   Rehab Time/Intention   Session Not Performed unable to treat, medical status change;other (see comments)  (Pt remains on vent, sedated, unable to participate in therapy. OT will sign off. Please renew orders when medically appropriate for therapy participation.)

## 2023-10-18 NOTE — SIGNIFICANT NOTE
10/18/23 1243   OTHER   Discipline physical therapist   Rehab Time/Intention   Session Not Performed unable to treat, medical status change  (Pt continues to be intubated and sedated, not appropriate for PT at this time. PT completing orders at this time, and new orders will need to be placed when patient is medically stable and appropriate.)

## 2023-10-19 ENCOUNTER — INPATIENT HOSPITAL (AMBULATORY)
Dept: URBAN - METROPOLITAN AREA HOSPITAL 84 | Facility: HOSPITAL | Age: 83
End: 2023-10-19
Payer: MEDICARE

## 2023-10-19 DIAGNOSIS — R74.01 ELEVATION OF LEVELS OF LIVER TRANSAMINASE LEVELS: ICD-10-CM

## 2023-10-19 DIAGNOSIS — K80.67 CALCULUS OF GALLBLADDER AND BILE DUCT WITH ACUTE AND CHRONIC: ICD-10-CM

## 2023-10-19 DIAGNOSIS — K82.8 OTHER SPECIFIED DISEASES OF GALLBLADDER: ICD-10-CM

## 2023-10-19 DIAGNOSIS — D72.829 ELEVATED WHITE BLOOD CELL COUNT, UNSPECIFIED: ICD-10-CM

## 2023-10-19 DIAGNOSIS — K83.8 OTHER SPECIFIED DISEASES OF BILIARY TRACT: ICD-10-CM

## 2023-10-19 DIAGNOSIS — R93.2 ABNORMAL FINDINGS ON DIAGNOSTIC IMAGING OF LIVER AND BILIARY: ICD-10-CM

## 2023-10-19 LAB
ALBUMIN SERPL-MCNC: 2.8 G/DL (ref 3.5–5.2)
ALBUMIN/GLOB SERPL: 0.9 G/DL
ALP SERPL-CCNC: 218 U/L (ref 39–117)
ALPHA1 GLOB MFR UR ELPH: 294 MG/DL (ref 90–200)
ALT SERPL W P-5'-P-CCNC: 63 U/L (ref 1–33)
ANION GAP SERPL CALCULATED.3IONS-SCNC: 15 MMOL/L (ref 5–15)
AST SERPL-CCNC: 106 U/L (ref 1–32)
BACTERIA SPEC AEROBE CULT: NORMAL
BILIRUB SERPL-MCNC: 0.2 MG/DL (ref 0–1.2)
BUN SERPL-MCNC: 62 MG/DL (ref 8–23)
BUN/CREAT SERPL: 31.6 (ref 7–25)
CA-I SERPL ISE-MCNC: 1.16 MMOL/L (ref 1.2–1.3)
CALCIUM SPEC-SCNC: 8.6 MG/DL (ref 8.6–10.5)
CERULOPLASMIN SERPL-MCNC: 34 MG/DL (ref 19–39)
CHLORIDE SERPL-SCNC: 102 MMOL/L (ref 98–107)
CO2 SERPL-SCNC: 23 MMOL/L (ref 22–29)
CREAT SERPL-MCNC: 1.96 MG/DL (ref 0.57–1)
DEPRECATED RDW RBC AUTO: 45.1 FL (ref 37–54)
EGFRCR SERPLBLD CKD-EPI 2021: 25.1 ML/MIN/1.73
EOSINOPHIL # BLD MANUAL: 0.68 10*3/MM3 (ref 0–0.4)
EOSINOPHIL NFR BLD MANUAL: 2 % (ref 0.3–6.2)
ERYTHROCYTE [DISTWIDTH] IN BLOOD BY AUTOMATED COUNT: 14.8 % (ref 12.3–15.4)
FERRITIN SERPL-MCNC: 368.1 NG/ML (ref 13–150)
GGT SERPL-CCNC: 86 U/L (ref 5–36)
GLOBULIN UR ELPH-MCNC: 3 GM/DL
GLUCOSE BLDC GLUCOMTR-MCNC: 100 MG/DL (ref 70–105)
GLUCOSE BLDC GLUCOMTR-MCNC: 130 MG/DL (ref 70–105)
GLUCOSE BLDC GLUCOMTR-MCNC: 193 MG/DL (ref 70–105)
GLUCOSE SERPL-MCNC: 127 MG/DL (ref 65–99)
HAV IGM SERPL QL IA: NORMAL
HBV CORE IGM SERPL QL IA: NORMAL
HBV SURFACE AG SERPL QL IA: NORMAL
HCT VFR BLD AUTO: 30.4 % (ref 34–46.6)
HCV AB SER DONR QL: NORMAL
HGB BLD-MCNC: 9.4 G/DL (ref 12–15.9)
LYMPHOCYTES # BLD MANUAL: 25.99 10*3/MM3 (ref 0.7–3.1)
LYMPHOCYTES NFR BLD MANUAL: 3 % (ref 5–12)
MAGNESIUM SERPL-MCNC: 1.9 MG/DL (ref 1.6–2.4)
MCH RBC QN AUTO: 27 PG (ref 26.6–33)
MCHC RBC AUTO-ENTMCNC: 30.8 G/DL (ref 31.5–35.7)
MCV RBC AUTO: 87.6 FL (ref 79–97)
MONOCYTES # BLD: 1.03 10*3/MM3 (ref 0.1–0.9)
NEUTROPHILS # BLD AUTO: 6.5 10*3/MM3 (ref 1.7–7)
NEUTROPHILS NFR BLD MANUAL: 19 % (ref 42.7–76)
PHOSPHATE SERPL-MCNC: 3.6 MG/DL (ref 2.5–4.5)
PLATELET # BLD AUTO: 195 10*3/MM3 (ref 140–450)
PMV BLD AUTO: 10.7 FL (ref 6–12)
POTASSIUM SERPL-SCNC: 3.1 MMOL/L (ref 3.5–5.2)
POTASSIUM SERPL-SCNC: 3.8 MMOL/L (ref 3.5–5.2)
PROT SERPL-MCNC: 5.8 G/DL (ref 6–8.5)
RBC # BLD AUTO: 3.47 10*6/MM3 (ref 3.77–5.28)
RBC MORPH BLD: NORMAL
SCAN SLIDE: NORMAL
SMALL PLATELETS BLD QL SMEAR: ADEQUATE
SMUDGE CELLS BLD QL SMEAR: ABNORMAL
SODIUM SERPL-SCNC: 140 MMOL/L (ref 136–145)
VARIANT LYMPHS NFR BLD MANUAL: 76 % (ref 19.6–45.3)
WBC NRBC COR # BLD: 34.2 10*3/MM3 (ref 3.4–10.8)

## 2023-10-19 PROCEDURE — 86015 ACTIN ANTIBODY EACH: CPT | Performed by: NURSE PRACTITIONER

## 2023-10-19 PROCEDURE — 25010000002 METOCLOPRAMIDE PER 10 MG: Performed by: NURSE PRACTITIONER

## 2023-10-19 PROCEDURE — 82728 ASSAY OF FERRITIN: CPT | Performed by: NURSE PRACTITIONER

## 2023-10-19 PROCEDURE — 83735 ASSAY OF MAGNESIUM: CPT | Performed by: STUDENT IN AN ORGANIZED HEALTH CARE EDUCATION/TRAINING PROGRAM

## 2023-10-19 PROCEDURE — 94668 MNPJ CHEST WALL SBSQ: CPT

## 2023-10-19 PROCEDURE — 94799 UNLISTED PULMONARY SVC/PX: CPT

## 2023-10-19 PROCEDURE — 25010000002 CHLOROTHIAZIDE PER 500 MG: Performed by: INTERNAL MEDICINE

## 2023-10-19 PROCEDURE — 25010000002 CALCIUM GLUCONATE-NACL 1-0.675 GM/50ML-% SOLUTION: Performed by: INTERNAL MEDICINE

## 2023-10-19 PROCEDURE — 82330 ASSAY OF CALCIUM: CPT | Performed by: STUDENT IN AN ORGANIZED HEALTH CARE EDUCATION/TRAINING PROGRAM

## 2023-10-19 PROCEDURE — 84100 ASSAY OF PHOSPHORUS: CPT | Performed by: STUDENT IN AN ORGANIZED HEALTH CARE EDUCATION/TRAINING PROGRAM

## 2023-10-19 PROCEDURE — 82977 ASSAY OF GGT: CPT | Performed by: NURSE PRACTITIONER

## 2023-10-19 PROCEDURE — 25010000002 PIPERACILLIN SOD-TAZOBACTAM PER 1 G: Performed by: INTERNAL MEDICINE

## 2023-10-19 PROCEDURE — 82948 REAGENT STRIP/BLOOD GLUCOSE: CPT

## 2023-10-19 PROCEDURE — 94669 MECHANICAL CHEST WALL OSCILL: CPT

## 2023-10-19 PROCEDURE — 84132 ASSAY OF SERUM POTASSIUM: CPT | Performed by: INTERNAL MEDICINE

## 2023-10-19 PROCEDURE — 94003 VENT MGMT INPAT SUBQ DAY: CPT

## 2023-10-19 PROCEDURE — 85025 COMPLETE CBC W/AUTO DIFF WBC: CPT | Performed by: NURSE PRACTITIONER

## 2023-10-19 PROCEDURE — 94761 N-INVAS EAR/PLS OXIMETRY MLT: CPT

## 2023-10-19 PROCEDURE — 25010000002 ENOXAPARIN PER 10 MG: Performed by: INTERNAL MEDICINE

## 2023-10-19 PROCEDURE — 82103 ALPHA-1-ANTITRYPSIN TOTAL: CPT | Performed by: NURSE PRACTITIONER

## 2023-10-19 PROCEDURE — 80053 COMPREHEN METABOLIC PANEL: CPT | Performed by: STUDENT IN AN ORGANIZED HEALTH CARE EDUCATION/TRAINING PROGRAM

## 2023-10-19 PROCEDURE — 25810000003 SODIUM CHLORIDE 0.9 % SOLUTION 500 ML FLEX CONT: Performed by: INTERNAL MEDICINE

## 2023-10-19 PROCEDURE — 99222 1ST HOSP IP/OBS MODERATE 55: CPT | Performed by: NURSE PRACTITIONER

## 2023-10-19 PROCEDURE — 0 MIDAZOLAM 1 MG/ML 100ML NS 100 MG/100ML SOLUTION: Performed by: INTERNAL MEDICINE

## 2023-10-19 PROCEDURE — 25010000002 PHENYLEPHRINE 10 MG/ML SOLUTION 5 ML VIAL: Performed by: INTERNAL MEDICINE

## 2023-10-19 PROCEDURE — 25010000002 HYDROMORPHONE HCL PF 50 MG/5ML SOLUTION: Performed by: NURSE PRACTITIONER

## 2023-10-19 PROCEDURE — 82390 ASSAY OF CERULOPLASMIN: CPT | Performed by: NURSE PRACTITIONER

## 2023-10-19 PROCEDURE — 85007 BL SMEAR W/DIFF WBC COUNT: CPT | Performed by: NURSE PRACTITIONER

## 2023-10-19 PROCEDURE — 86038 ANTINUCLEAR ANTIBODIES: CPT | Performed by: NURSE PRACTITIONER

## 2023-10-19 PROCEDURE — 86381 MITOCHONDRIAL ANTIBODY EACH: CPT | Performed by: NURSE PRACTITIONER

## 2023-10-19 PROCEDURE — 25810000003 SODIUM CHLORIDE 0.9 % SOLUTION 250 ML FLEX CONT: Performed by: INTERNAL MEDICINE

## 2023-10-19 PROCEDURE — 80074 ACUTE HEPATITIS PANEL: CPT | Performed by: NURSE PRACTITIONER

## 2023-10-19 RX ORDER — CALCIUM GLUCONATE 20 MG/ML
1000 INJECTION, SOLUTION INTRAVENOUS ONCE
Status: COMPLETED | OUTPATIENT
Start: 2023-10-19 | End: 2023-10-19

## 2023-10-19 RX ORDER — POTASSIUM CHLORIDE 7.45 MG/ML
10 INJECTION INTRAVENOUS
Status: DISCONTINUED | OUTPATIENT
Start: 2023-10-19 | End: 2023-10-19

## 2023-10-19 RX ORDER — MIDAZOLAM IN NACL,ISO-OSMOT/PF 50 MG/50ML
1-10 INFUSION BOTTLE (ML) INTRAVENOUS
Status: DISCONTINUED | OUTPATIENT
Start: 2023-10-19 | End: 2023-10-22

## 2023-10-19 RX ORDER — POTASSIUM CHLORIDE 1.5 G/1.58G
40 POWDER, FOR SOLUTION ORAL ONCE
Status: COMPLETED | OUTPATIENT
Start: 2023-10-19 | End: 2023-10-19

## 2023-10-19 RX ADMIN — Medication 10 ML: at 09:24

## 2023-10-19 RX ADMIN — ACETYLCYSTEINE 3 ML: 200 SOLUTION ORAL; RESPIRATORY (INHALATION) at 15:23

## 2023-10-19 RX ADMIN — MIDAZOLAM HYDROCHLORIDE 6 MG/HR: 1 INJECTION, SOLUTION INTRAVENOUS at 15:17

## 2023-10-19 RX ADMIN — ACETYLCYSTEINE 3 ML: 200 SOLUTION ORAL; RESPIRATORY (INHALATION) at 07:25

## 2023-10-19 RX ADMIN — IPRATROPIUM BROMIDE AND ALBUTEROL SULFATE 3 ML: .5; 3 SOLUTION RESPIRATORY (INHALATION) at 11:48

## 2023-10-19 RX ADMIN — SERTRALINE 150 MG: 100 TABLET, FILM COATED ORAL at 08:18

## 2023-10-19 RX ADMIN — BUMETANIDE 1 MG/HR: 0.25 INJECTION INTRAMUSCULAR; INTRAVENOUS at 08:17

## 2023-10-19 RX ADMIN — IPRATROPIUM BROMIDE AND ALBUTEROL SULFATE 3 ML: .5; 3 SOLUTION RESPIRATORY (INHALATION) at 15:19

## 2023-10-19 RX ADMIN — CALCIUM GLUCONATE 1000 MG: 20 INJECTION, SOLUTION INTRAVENOUS at 08:18

## 2023-10-19 RX ADMIN — CHLOROTHIAZIDE SODIUM 500 MG: 500 INJECTION, POWDER, LYOPHILIZED, FOR SOLUTION INTRAVENOUS at 08:18

## 2023-10-19 RX ADMIN — SENNOSIDES AND DOCUSATE SODIUM 1 TABLET: 50; 8.6 TABLET ORAL at 21:13

## 2023-10-19 RX ADMIN — METOCLOPRAMIDE 5 MG: 5 INJECTION, SOLUTION INTRAMUSCULAR; INTRAVENOUS at 04:08

## 2023-10-19 RX ADMIN — IPRATROPIUM BROMIDE AND ALBUTEROL SULFATE 3 ML: .5; 3 SOLUTION RESPIRATORY (INHALATION) at 07:29

## 2023-10-19 RX ADMIN — SENNOSIDES AND DOCUSATE SODIUM 1 TABLET: 50; 8.6 TABLET ORAL at 08:18

## 2023-10-19 RX ADMIN — CHLOROTHIAZIDE SODIUM 500 MG: 500 INJECTION, POWDER, LYOPHILIZED, FOR SOLUTION INTRAVENOUS at 21:13

## 2023-10-19 RX ADMIN — ENOXAPARIN SODIUM 30 MG: 100 INJECTION SUBCUTANEOUS at 15:19

## 2023-10-19 RX ADMIN — KETAMINE HYDROCHLORIDE 0.8 MG/KG/HR: 100 INJECTION, SOLUTION, CONCENTRATE INTRAMUSCULAR; INTRAVENOUS at 01:06

## 2023-10-19 RX ADMIN — LEVOTHYROXINE SODIUM 100 MCG: 0.1 TABLET ORAL at 05:33

## 2023-10-19 RX ADMIN — LANSOPRAZOLE 30 MG: 30 TABLET, ORALLY DISINTEGRATING ORAL at 05:33

## 2023-10-19 RX ADMIN — PIPERACILLIN AND TAZOBACTAM 4.5 G: 4; .5 INJECTION, POWDER, FOR SOLUTION INTRAVENOUS at 21:13

## 2023-10-19 RX ADMIN — ROSUVASTATIN 10 MG: 10 TABLET, FILM COATED ORAL at 21:13

## 2023-10-19 RX ADMIN — IPRATROPIUM BROMIDE AND ALBUTEROL SULFATE 3 ML: .5; 3 SOLUTION RESPIRATORY (INHALATION) at 20:07

## 2023-10-19 RX ADMIN — KETAMINE HYDROCHLORIDE 0.8 MG/KG/HR: 100 INJECTION, SOLUTION, CONCENTRATE INTRAMUSCULAR; INTRAVENOUS at 09:24

## 2023-10-19 RX ADMIN — ACETYLCYSTEINE 3 ML: 200 SOLUTION ORAL; RESPIRATORY (INHALATION) at 20:07

## 2023-10-19 RX ADMIN — POTASSIUM CHLORIDE 40 MEQ: 1.5 POWDER, FOR SOLUTION ORAL at 06:30

## 2023-10-19 RX ADMIN — BUDESONIDE 0.5 MG: 0.5 INHALANT RESPIRATORY (INHALATION) at 07:33

## 2023-10-19 RX ADMIN — METOCLOPRAMIDE 5 MG: 5 INJECTION, SOLUTION INTRAMUSCULAR; INTRAVENOUS at 15:19

## 2023-10-19 RX ADMIN — BUDESONIDE 0.5 MG: 0.5 INHALANT RESPIRATORY (INHALATION) at 20:13

## 2023-10-19 RX ADMIN — POLYETHYLENE GLYCOL 3350 17 G: 17 POWDER, FOR SOLUTION ORAL at 08:18

## 2023-10-19 RX ADMIN — Medication 10 ML: at 21:14

## 2023-10-19 RX ADMIN — POLYETHYLENE GLYCOL 3350 17 G: 17 POWDER, FOR SOLUTION ORAL at 21:13

## 2023-10-19 RX ADMIN — PIPERACILLIN AND TAZOBACTAM 3.38 G: 3; .375 INJECTION, POWDER, FOR SOLUTION INTRAVENOUS at 04:08

## 2023-10-19 RX ADMIN — GUAIFENESIN 400 MG: 200 TABLET ORAL at 04:09

## 2023-10-19 RX ADMIN — PIPERACILLIN AND TAZOBACTAM 3.38 G: 3; .375 INJECTION, POWDER, FOR SOLUTION INTRAVENOUS at 11:58

## 2023-10-19 RX ADMIN — HYDROMORPHONE HYDROCHLORIDE 2.5 MG/HR: 10 INJECTION, SOLUTION INTRAMUSCULAR; INTRAVENOUS; SUBCUTANEOUS at 08:19

## 2023-10-19 RX ADMIN — METOCLOPRAMIDE 5 MG: 5 INJECTION, SOLUTION INTRAMUSCULAR; INTRAVENOUS at 21:14

## 2023-10-19 RX ADMIN — PHENYLEPHRINE HYDROCHLORIDE 0.7 MCG/KG/MIN: 10 INJECTION INTRAVENOUS at 17:40

## 2023-10-19 RX ADMIN — KETAMINE HYDROCHLORIDE 0.8 MG/KG/HR: 100 INJECTION, SOLUTION, CONCENTRATE INTRAMUSCULAR; INTRAVENOUS at 17:49

## 2023-10-19 RX ADMIN — GUAIFENESIN 400 MG: 200 TABLET ORAL at 21:13

## 2023-10-19 RX ADMIN — GUAIFENESIN 400 MG: 200 TABLET ORAL at 15:19

## 2023-10-19 NOTE — CASE MANAGEMENT/SOCIAL WORK
Continued Stay Note   Hoang     Patient Name: Diane Guan  MRN: [id number]  Today's Date: 10/19/2023    Admit Date: 10/10/2023    Plan: Referral to [facility] pending. Will require precert. PASRR approved.   Discharge Plan       Row Name 10/19/23 1800       Plan    Plan Comments Multiple gtts, vent, tube feeds, GI consult                Chart review only.         Expected Discharge Date and Time       Expected Discharge Date Expected Discharge Time    Oct 19, 2023               Tejal Justin RN

## 2023-10-19 NOTE — PROGRESS NOTES
Infectious Diseases Progress Note      LOS: 7 days   Patient Care Team:  Asia Queen APRN as PCP - General (Nurse Practitioner)  Asia Queen APRN as Nurse Practitioner (Nurse Practitioner)  Ling Bolden MD as Consulting Physician (Hematology and Oncology)    Chief Complaint: Intubated on the ventilator    Subjective     The patient had no fever during the last 24 hours.  She remained on Shahbaz-Synephrine drip.  She remained intubated on the ventilator but currently on 80% FiO2.  She is s/p bronchoscopy yesterday    Review of Systems:   Review of Systems   Unable to perform ROS: Intubated        Objective     Vital Signs  Temp:  [97.4 °F (36.3 °C)-99.4 °F (37.4 °C)] 97.7 °F (36.5 °C)  Heart Rate:  [53-77] 77  Resp:  [20-27] 27  BP: (93)/(55) 93/55  FiO2 (%):  [70 %-100 %] 70 %    Physical Exam:  Physical Exam  Constitutional:       Comments: Intubated and sedated on the ventilator   HENT:      Head: Normocephalic and atraumatic.   Eyes:      Pupils: Pupils are equal, round, and reactive to light.   Cardiovascular:      Rate and Rhythm: Normal rate and regular rhythm.   Pulmonary:      Breath sounds: Rales present.   Abdominal:      General: Abdomen is flat. Bowel sounds are normal.      Palpations: Abdomen is soft.   Musculoskeletal:      Cervical back: Neck supple.      Right lower leg: No edema.      Left lower leg: No edema.          Results Review:    I have reviewed all clinical data, test, lab, and imaging results.     Radiology  US Abdomen Limited    Result Date: 10/19/2023  US ABDOMEN LIMITED Date of Exam: 10/18/2023 11:40 PM EDT Indication: Eval for cholecystitis. Comparison: CT abdomen pelvis 10/18/2023 Technique: Grayscale and color Doppler ultrasound evaluation of the right upper quadrant was performed. Findings: Visualized portions of the pancreatic parenchyma demonstrate normal echogenicity, bowel gas shadowing limits complete assessment. Background liver echogenicity is within  normal limits. Hyperechoic lesion in the left hepatic lobe centrally measuring 2 x 1.4 cm suggesting hemangioma. Hepatopetal flow in the portal vein. Common bile duct is dilated up to 14 mm. Visualized hepatic veins are patent. The gallbladder is distended with mild layering sludge. Negative for gallbladder wall thickening or pericholecystic inflammation. The right kidney measures 9.5 x 4.8 x 4.4 cm. No right-sided hydronephrosis. Cyst at the lower pole right kidney measures 1.2 cm.     Impression: 1. Dilated common bile duct measuring 14 mm. Correlate for clinical evidence of biliary obstruction and consider MRCP for further assessment. 2. Distended gallbladder with mild layering sludge. No gallbladder wall thickening or pericholecystic inflammation. 3. Left hepatic lobe 2 cm hyperechoic lesion suggesting hemangioma. 4. Right lower pole renal cyst measuring 1.2 cm. Electronically Signed: Pavan Amezquita MD  10/19/2023 7:36 AM EDT  Workstation ID: [id number]    CT Abdomen Pelvis Without Contrast    Result Date: 10/18/2023  CT ABDOMEN PELVIS WO CONTRAST Date of Exam: 10/18/2023 1:58 PM EDT Indication: Eval for infectious nidus. Comparison: 9/16/2022. Technique: Axial CT images were obtained of the abdomen and pelvis without the administration of contrast. Sagittal and coronal reconstructions were performed.  Automated exposure control and iterative reconstruction methods were used. Findings: Findings in the lower lobes were described on a separate CT chest exam report. There is a Bellamy catheter in the bladder which is collapsed. There is no pelvic mass or free fluid. There is extensive diverticulosis without findings of acute diverticulitis.  There is a small fat-containing right inguinal hernia. There is no large or small bowel dilatation or focal inflammatory changes. The abdominal aorta is moderately calcified and is normal in size. There are no renal stones or hydronephrosis. The nonenhanced solid organs are normal in  size. The gallbladder measures 6 cm transversely and is somewhat elongated. The gallbladder wall is thin. The common bile duct measures 1.8 cm transversely. There is mild central intrahepatic bile duct dilatation. An NG tube terminates in the gastric antrum.     Impression: Please see CT chest report for description of abnormalities of the lungs. Moderately distended gallbladder and bile duct dilatation. Correlation with gallbladder ultrasound may be useful. Diverticulosis without acute diverticulitis. Electronically Signed: Rosa Melendez MD  10/18/2023 2:26 PM EDT  Workstation ID: [id number]    CT Chest Without Contrast Diagnostic    Result Date: 10/18/2023  CT CHEST WO CONTRAST DIAGNOSTIC Date of Exam: 10/18/2023 1:58 PM EDT Indication: Worsening hypoxemic resp failureComparison: None available. Technique: Axial CT images were obtained of the chest without contrast administration.  Sagittal and coronal reconstructions were performed.  Automated exposure control and iterative reconstruction methods were used. Findings:  ET tube appears in proper position several centimeters above the jaden. NG tube is directed towards the distal stomach although tip is not included. Limited imaging of the upper abdomen demonstrates the gallbladder to be distended to 6 cm transversely although the wall is thin. There is mild cardiomegaly. There our small bilateral pleural effusions. There are dense consolidating infiltrates in the bilateral lower lobes, greatest on the left. There is some mild atelectasis of the left upper lobe posteriorly. The right upper and right middle lobes appear clear. There is mild cardiomegaly.      Impression: Small effusions. Areas of infiltrate likely represent combination of atelectasis and pneumonia. Electronically Signed: Rosa Melendez MD  10/18/2023 2:22 PM EDT  Workstation ID: [id number]     Cardiology    Laboratory    Results from last 7 days   Lab Units 10/19/23  0413 10/18/23  0457  10/17/23  0424 10/16/23  0429 10/15/23  0616 10/14/23  0753 10/13/23  2356   WBC 10*3/mm3 34.20* 30.20* 14.40* 17.40* 16.10*  --  28.80*   HEMOGLOBIN g/dL 9.4* 8.9* 10.3* 10.3* 9.9*  --  11.1*   HEMOGLOBIN, POC g/dL  --   --   --   --   --  12.5  --    HEMATOCRIT % 30.4* 28.3* 33.8* 33.3* 32.8*  --  37.6   HEMATOCRIT POC %  --   --   --   --   --  37*  --    PLATELETS 10*3/mm3 195 166 122* 131* 131*  --  148     Results from last 7 days   Lab Units 10/19/23  0413 10/18/23  0453 10/17/23  0424 10/16/23  0429 10/15/23  0616 10/14/23  1620 10/14/23  0425 10/13/23  2356   SODIUM mmol/L 140 138 143 143 142  --  139 136   POTASSIUM mmol/L 3.1* 3.6 3.7 4.2 5.0 5.2 6.0* 5.6*   CHLORIDE mmol/L 102 99 103 106 107  --  104 105   CO2 mmol/L 23.0 24.0 24.0 23.0 25.0  --  22.0 18.0*   BUN mg/dL 62* 61* 65* 60* 52*  --  49* 47*   CREATININE mg/dL 1.96* 2.08* 1.54* 1.48* 1.43*  --  1.55* 1.46*   GLUCOSE mg/dL 127* 147* 183* 131* 136*  --  186* 150*   ALBUMIN g/dL 2.8* 3.0* 3.3*  --   --   --   --   --    BILIRUBIN mg/dL 0.2 0.2 0.3  --   --   --   --   --    ALK PHOS U/L 218* 97 110  --   --   --   --   --    AST (SGOT) U/L 106* 23 11  --   --   --   --   --    ALT (SGPT) U/L 63* 11 9  --   --   --   --   --    CALCIUM mg/dL 8.6 8.6 9.3 9.4 9.3  --  9.3 9.3                 Microbiology   Microbiology Results (last 10 days)       Procedure Component Value - Date/Time    MRSA Screen, PCR (Inpatient) - Swab, Nares [[id number]]  (Normal) Collected: 10/18/23 1546    Lab Status: Final result Specimen: Swab from Nares Updated: 10/18/23 1727     MRSA PCR No MRSA Detected    Narrative:      The negative predictive value of this diagnostic test is high and should only be used to consider de-escalating anti-MRSA therapy. A positive result may indicate colonization with MRSA and must be correlated clinically.    AFB Culture - Wash, Bronchus [[id number]] Collected: 10/18/23 1440    Lab Status: Preliminary result Specimen: Wash from Bronchus  Updated: 10/19/23 1047     AFB Stain No acid fast bacilli seen on concentrated smear    Respiratory Culture - Wash, Bronchus [[id number]] Collected: 10/18/23 1440    Lab Status: Preliminary result Specimen: Wash from Bronchus Updated: 10/19/23 1047     Respiratory Culture Light growth (2+) The culture consists of normal respiratory franci. This is a preliminary report; final report to follow.     Gram Stain Many (4+) WBCs seen      Rare (1+) Gram positive cocci in clusters    Respiratory Culture - Sputum, ET Suction [[id number]] Collected: 10/15/23 1354    Lab Status: Final result Specimen: Sputum from ET Suction Updated: 10/18/23 1030     Respiratory Culture Scant growth (1+) Normal respiratory franci. No S. aureus or Pseudomonas aeruginosa detected. Final report.     Gram Stain Moderate (3+) WBCs per low power field      Few (2+) Gram positive cocci in clusters    Blood Culture - Blood, Hand, Left [[id number]]  (Normal) Collected: 10/14/23 1620    Lab Status: Preliminary result Specimen: Blood from Hand, Left Updated: 10/18/23 1701     Blood Culture No growth at 4 days    Urine Culture - Urine, Straight Cath [[id number]]  (Abnormal)  (Susceptibility) Collected: 10/14/23 1342    Lab Status: Final result Specimen: Urine from Straight Cath Updated: 10/17/23 0224     Urine Culture >100,000 CFU/mL Klebsiella pneumoniae ssp pneumoniae      >100,000 CFU/mL Escherichia coli    Narrative:      Colonization of the urinary tract without infection is common. Treatment is discouraged unless the patient is symptomatic, pregnant, or undergoing an invasive urologic procedure.    Susceptibility        Klebsiella pneumoniae ssp pneumoniae      JUAN      Ampicillin Resistant      Ampicillin + Sulbactam Susceptible      Cefazolin Susceptible      Cefepime Susceptible      Ceftazidime Susceptible      Ceftriaxone Susceptible      Gentamicin Susceptible      Levofloxacin Susceptible      Nitrofurantoin Intermediate      Piperacillin +  Tazobactam Susceptible      Trimethoprim + Sulfamethoxazole Susceptible                       Susceptibility        Escherichia coli      JUAN      Ampicillin Susceptible      Ampicillin + Sulbactam Susceptible      Cefazolin Susceptible      Cefepime Susceptible      Ceftazidime Susceptible      Ceftriaxone Susceptible      Gentamicin Susceptible      Levofloxacin Susceptible      Nitrofurantoin Susceptible      Piperacillin + Tazobactam Susceptible      Trimethoprim + Sulfamethoxazole Susceptible                           Respiratory Panel PCR w/COVID-19(SARS-CoV-2) LESLEE/VIVIANE/PAVAN/PAD/COR/MAD/JUAN DAVID In-House, NP Swab in UTM/VTM, 3-4 HR TAT - Swab, Nasopharynx [[id number]]  (Abnormal) Collected: 10/10/23 1510    Lab Status: Final result Specimen: Swab from Nasopharynx Updated: 10/10/23 1613     ADENOVIRUS, PCR Not Detected     Coronavirus 229E Not Detected     Coronavirus HKU1 Not Detected     Coronavirus NL63 Not Detected     Coronavirus OC43 Not Detected     COVID19 Not Detected     Human Metapneumovirus Not Detected     Human Rhinovirus/Enterovirus Detected     Influenza A PCR Not Detected     Influenza B PCR Not Detected     Parainfluenza Virus 1 Not Detected     Parainfluenza Virus 2 Not Detected     Parainfluenza Virus 3 Not Detected     Parainfluenza Virus 4 Not Detected     RSV, PCR Not Detected     Bordetella pertussis pcr Not Detected     Bordetella parapertussis PCR Not Detected     Chlamydophila pneumoniae PCR Not Detected     Mycoplasma pneumo by PCR Not Detected    Narrative:      In the setting of a positive respiratory panel with a viral infection PLUS a negative procalcitonin without other underlying concern for bacterial infection, consider observing off antibiotics or discontinuation of antibiotics and continue supportive care. If the respiratory panel is positive for atypical bacterial infection (Bordetella pertussis, Chlamydophila pneumoniae, or Mycoplasma pneumoniae), consider antibiotic de-escalation  to target atypical bacterial infection.            Medication Review:       Schedule Meds  acetylcysteine, 3 mL, Nebulization, TID  budesonide, 0.5 mg, Nebulization, Q12H  chlorothiazide, 500 mg, Intravenous, Q12H  enoxaparin, 30 mg, Subcutaneous, Daily  ferrous sulfate, 300 mg, Nasogastric, Once per day on Mon Wed Fri  guaiFENesin, 400 mg, Nasogastric, Q8H  insulin lispro, 2-7 Units, Subcutaneous, Q6H  ipratropium-albuterol, 3 mL, Nebulization, 4x Daily - RT  lansoprazole, 30 mg, Nasogastric, Q AM  levothyroxine, 100 mcg, Nasogastric, Q AM  metoclopramide, 5 mg, Intravenous, Q8H  piperacillin-tazobactam, 3.375 g, Intravenous, Q8H  polyethylene glycol, 17 g, Nasogastric, BID  rosuvastatin, 10 mg, Nasogastric, Nightly  senna-docusate sodium, 1 tablet, Nasogastric, BID  sertraline, 150 mg, Nasogastric, Daily  sodium chloride, 10 mL, Intravenous, Q12H  sodium chloride, 10 mL, Intravenous, Q12H        Infusion Meds  bumetanide, 1 mg/hr, Last Rate: 1 mg/hr (10/19/23 0817)  HYDROmorphone 1 mg/ml, 0.2-3 mg/hr, Last Rate: 2.5 mg/hr (10/19/23 0819)  ketamine (KETALAR) 500 mg/500 mL ns infusion, 0.06-2.5 mg/kg/hr, Last Rate: 0.8 mg/kg/hr (10/19/23 0924)  phenylephrine, 0.5-3 mcg/kg/min, Last Rate: 0.25 mcg/kg/min (10/18/23 1628)        PRN Meds    acetaminophen    senna-docusate sodium **AND** polyethylene glycol **AND** bisacodyl **AND** bisacodyl    cyclobenzaprine    dextrose    dextrose    dextrose    fentaNYL citrate (PF)    glucagon (human recombinant)    hydrALAZINE    ipratropium-albuterol    lidocaine    midazolam    nitroglycerin    ondansetron    oxyCODONE    [COMPLETED] Insert Peripheral IV **AND** sodium chloride    sodium chloride    sodium chloride    sodium chloride    sodium chloride        Assessment & Plan       Antimicrobial Therapy   1.  IV Zosyn        2.        3.        4.        5.            Assessment     Acute respiratory failure.  Most likely secondary to hospital-acquired pneumonia.  CT scan of  the chest on October 18 showed bilateral lower lobe infiltrates.  There was no additional findings consistent with PCP pneumonia  The patient is currently on ventilator on 80% FiO2 and 10 PEEP  Patient is s/p bronchoscopy and BAL cultures are pending  MRSA screen was negative     Severe lymphocytosis.  Most likely secondary to underlying CLL.  Patient was not on treatment prior to admission     History of breast cancer s/p mastectomies in the past     UTI with Klebsiella pneumoniae and E. coli.  Organisms were relatively susceptible     Diabetes mellitus type 2     Rhinovirus infection.  Nasal swab was positive for rhinovirus PCR     Plan     Continue Zosyn 3.375 g every 8 hours  Continue supportive care  A.m. labs  Droplet isolation for rhinovirus infection for total of 7 days.  Can come off isolation on October 22  The case was discussed with the patient's daughter at bedside  The case was discussed with ICU service      Damian Santos MD  10/19/23  12:02 EDT    Note is dictated utilizing voice recognition software/Dragon

## 2023-10-19 NOTE — PLAN OF CARE
Goal Outcome Evaluation:    Ketamine, Shahbaz, bumex, dilaudid, and versed gtt infusing. Sedation adequate throughout shift; no restless occurred. Adequate urine output (bumex drip infusing). Vent settings remain at 100% FiO2; 10 PEEP. TF restarted after abd. ulstrasound. Bradycardic throughout evening. Will continue to monitor

## 2023-10-19 NOTE — PROGRESS NOTES
"RENAL/KCC:     LOS: 7 days    Patient Care Team:  Asia Queen APRN as PCP - General (Nurse Practitioner)  Asia Queen APRN as Nurse Practitioner (Nurse Practitioner)  Ling Bolden MD as Consulting Physician (Hematology and Oncology)    Chief Complaint:  Acute resp failure    Subjective     Interval History:   Chart reviewed  Remains on the vent on 100% FiO2  S/P bronch yesterday  WBC up to 34K  UOP greatly improved with 2.7L charted yesterday and continued good output this AM  Discussed with nursing at bedside    Objective     Vital Sign Min/Max for last 24 hours  Temp  Min: 97.4 °F (36.3 °C)  Max: 99.4 °F (37.4 °C)   BP  Min: 78/35  Max: 168/61   Pulse  Min: 53  Max: 87   Resp  Min: 20  Max: 21   SpO2  Min: 73 %  Max: 99 %   No data recorded   No data recorded     Flowsheet Rows      Flowsheet Row First Filed Value   Admission Height 162.6 cm (64\") Documented at 10/10/2023 1252   Admission Weight 78.5 kg (173 lb) Documented at 10/10/2023 1252            No intake/output data recorded.  I/O last 3 completed shifts:  In: 5903 [I.V.:4877; Other:511; NG/GT:515]  Out: 2875 [Urine:2875]    Physical Exam:  GEN: Intubated, sedated  ENT: +ETT  NECK: Supple, no JVD  CHEST: Lungs are clear  CV: RRR, no M/G/R, no peripheral edema  ABD: Soft, NT, +BS  SKIN: Warm and Dry  NEURO: Sedated      WBC WBC   Date Value Ref Range Status   10/19/2023 34.20 (C) 3.40 - 10.80 10*3/mm3 Final   10/18/2023 30.20 (C) 3.40 - 10.80 10*3/mm3 Final   10/17/2023 14.40 (H) 3.40 - 10.80 10*3/mm3 Final      HGB Hemoglobin   Date Value Ref Range Status   10/19/2023 9.4 (L) 12.0 - 15.9 g/dL Final   10/18/2023 8.9 (L) 12.0 - 15.9 g/dL Final   10/17/2023 10.3 (L) 12.0 - 15.9 g/dL Final      HCT Hematocrit   Date Value Ref Range Status   10/19/2023 30.4 (L) 34.0 - 46.6 % Final   10/18/2023 28.3 (L) 34.0 - 46.6 % Final   10/17/2023 33.8 (L) 34.0 - 46.6 % Final      Platlets No results found for: \"LABPLAT\"   MCV MCV   Date Value Ref " "Range Status   10/19/2023 87.6 79.0 - 97.0 fL Final   10/18/2023 87.2 79.0 - 97.0 fL Final   10/17/2023 89.4 79.0 - 97.0 fL Final          Sodium Sodium   Date Value Ref Range Status   10/19/2023 140 136 - 145 mmol/L Final   10/18/2023 138 136 - 145 mmol/L Final   10/17/2023 143 136 - 145 mmol/L Final      Potassium Potassium   Date Value Ref Range Status   10/19/2023 3.1 (L) 3.5 - 5.2 mmol/L Final   10/18/2023 3.6 3.5 - 5.2 mmol/L Final   10/17/2023 3.7 3.5 - 5.2 mmol/L Final      Chloride Chloride   Date Value Ref Range Status   10/19/2023 102 98 - 107 mmol/L Final   10/18/2023 99 98 - 107 mmol/L Final   10/17/2023 103 98 - 107 mmol/L Final      CO2 CO2   Date Value Ref Range Status   10/19/2023 23.0 22.0 - 29.0 mmol/L Final   10/18/2023 24.0 22.0 - 29.0 mmol/L Final   10/17/2023 24.0 22.0 - 29.0 mmol/L Final      BUN BUN   Date Value Ref Range Status   10/19/2023 62 (H) 8 - 23 mg/dL Final   10/18/2023 61 (H) 8 - 23 mg/dL Final   10/17/2023 65 (H) 8 - 23 mg/dL Final      Creatinine Creatinine   Date Value Ref Range Status   10/19/2023 1.96 (H) 0.57 - 1.00 mg/dL Final   10/18/2023 2.08 (H) 0.57 - 1.00 mg/dL Final   10/17/2023 1.54 (H) 0.57 - 1.00 mg/dL Final      Calcium Calcium   Date Value Ref Range Status   10/19/2023 8.6 8.6 - 10.5 mg/dL Final   10/18/2023 8.6 8.6 - 10.5 mg/dL Final   10/17/2023 9.3 8.6 - 10.5 mg/dL Final      PO4 No results found for: \"CAPO4\"   Albumin Albumin   Date Value Ref Range Status   10/19/2023 2.8 (L) 3.5 - 5.2 g/dL Final   10/18/2023 3.0 (L) 3.5 - 5.2 g/dL Final   10/17/2023 3.3 (L) 3.5 - 5.2 g/dL Final      Magnesium Magnesium   Date Value Ref Range Status   10/19/2023 1.9 1.6 - 2.4 mg/dL Final   10/18/2023 2.0 1.6 - 2.4 mg/dL Final   10/17/2023 1.7 1.6 - 2.4 mg/dL Final      Uric Acid No results found for: \"URICACID\"        Results Review:     I reviewed the patient's new clinical results.    acetylcysteine, 3 mL, Nebulization, TID  artificial tears, , Both Eyes, Q4H  budesonide, " 0.5 mg, Nebulization, Q12H  calcium gluconate, 1,000 mg, Intravenous, Once  chlorothiazide, 500 mg, Intravenous, Q12H  cisatracurium, 100 mcg/kg, Intravenous, Once  enoxaparin, 30 mg, Subcutaneous, Daily  ferrous sulfate, 300 mg, Nasogastric, Once per day on Mon Wed Fri  guaiFENesin, 400 mg, Nasogastric, Q8H  insulin lispro, 2-7 Units, Subcutaneous, Q6H  ipratropium-albuterol, 3 mL, Nebulization, 4x Daily - RT  lansoprazole, 30 mg, Nasogastric, Q AM  levothyroxine, 100 mcg, Nasogastric, Q AM  metoclopramide, 5 mg, Intravenous, Q8H  piperacillin-tazobactam, 3.375 g, Intravenous, Q8H  polyethylene glycol, 17 g, Nasogastric, BID  rosuvastatin, 10 mg, Nasogastric, Nightly  senna-docusate sodium, 1 tablet, Nasogastric, BID  sertraline, 150 mg, Nasogastric, Daily  sodium chloride, 10 mL, Intravenous, Q12H  sodium chloride, 10 mL, Intravenous, Q12H      bumetanide, 1 mg/hr, Last Rate: 1 mg/hr (10/18/23 1239)  cisatracurium (NIMBEX) 200 mg in sodium chloride 0.9 % 100 mL, 0.5-10 mcg/kg/min  HYDROmorphone 1 mg/ml, 0.2-3 mg/hr, Last Rate: 2.5 mg/hr (10/18/23 1821)  ketamine (KETALAR) 500 mg/500 mL ns infusion, 0.06-2.5 mg/kg/hr, Last Rate: 0.8 mg/kg/hr (10/19/23 0106)  Midazolam 1 mg/mL 100mL NS, 1-10 mg/hr, Last Rate: 6 mg/hr (10/18/23 2008)  phenylephrine, 0.5-3 mcg/kg/min, Last Rate: 0.25 mcg/kg/min (10/18/23 1628)        Medication Review: Reviewed    Assessment & Plan     LEXUS/CKD3  Sepsis  Fluid overload  Hypokalemia - from diuresis  Hypocalcemia  Pulmonary edema  Hypotension    Acute hypoxemic respiratory failure    Mixed anxiety and depressive disorder    Primary osteoarthritis involving multiple joints    Hiatal hernia with gastroesophageal reflux    Mixed hyperlipidemia    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    Acquired hypothyroidism    Type 2 diabetes mellitus with stage 3b chronic kidney disease, without long-term current use of insulin    Insomnia    Overactive bladder    Vitamin B 12  deficiency    Vitamin D deficiency    LIBBY (obstructive sleep apnea)    COPD (chronic obstructive pulmonary disease)    CKD (chronic kidney disease) stage 3, GFR 30-59 ml/min    History of breast cancer    Personal history of CLL (chronic lymphocytic leukemia)    History of cigarette smoking    Fall at home    Rhinovirus infection    Acute respiratory failure    Hyperkalemia    Respiratory failure    Pleural effusion    Plan: Cr stable at 1.9 with much improved UOP on Bumex gtt.  Continue Bumex and add Diuril 500 mg IV x 2 doses today.  Replace K and recheck.  Still may need CRRT/SCUF but for now continue to diurese.  Continue pressor support and scheduled Midodrine.  FC in place for urinary retention.  Replace Ca.  WBC up to 34K - Abx per pimary.  Vent per Pulm.  Will follow.      Freedrick Bonds MD  [facility]  10/19/23  07:25 EDT

## 2023-10-19 NOTE — PROGRESS NOTES
Enter Query Response Below      Query Response: Unable to determine              If applicable, please update the problem list.     Patient: Diane Guan        : 1940  Account: [id number]           Admit Date:         How to Respond to this query:       a. Click New Note     b. Answer query within the yellow box.                c. Update the Problem List, if applicable.      If you have any questions about this query contact me at: [email or web address]     :    83 year old female admitted with acute respiratory failure.  Clinical indicators: History of type 2 diabetes, CLL.  H&P physical exam-Pulmonary:Pulmonary effort is normal. Patient has scattered bilateral rhonchi.  10/10 Nasopharynx swab - Human Rhinovirus/Enterovirus.  Respiratory Culture - Sputum, ET Suction [[id number]] Collected: 10/15/23     Lab Status: Final result Specimen: Sputum from ET Suction Updated: 10/18/23      Respiratory Culture- Scant growth (1+) Normal respiratory franci. No S. aureus or Pseudomonas aeruginosa detected. Final report. Gram Stain-Moderate (3+) WBCs per low power field, Few (2+) Gram positive cocci in clusters.  10/18 MRSA screen, PCR-swab, nares:  No MRSA detected.  10/19 Infectious disease: Acute respiratory failure.  Most likely secondary to hospital-acquired pneumonia.  Treatments: iv rocephin (10/15-10/18), iv levophed (10/18)  iv zosyn (10/18-10/25).  Please clarify the type of pneumonia the patient was treated/monitored for:    Gram negative pneumonia (excluding Haemophilus influenzae)  MRSA pneumonia  Pneumonia due to ( please specify suspected organism )_______________  Bacterial pneumonia unspecified  Other- specify______  Unable to determine      By submitting this query, we are merely seeking further clarification of documentation to accurately reflect all conditions that you are monitoring, evaluating, treating or that extend the hospitalization or utilize additional resources of care. Please  utilize your independent clinical judgment when addressing the question(s) above.     This query and your response, once completed, will be entered into the legal medical record.    Sincerely,  Dionne Spivey RN  Clinical Documentation Integrity Program

## 2023-10-19 NOTE — CONSULTS
GI CONSULT  NOTE:    Referring Provider:  Oli Chapman NP    Chief complaint: CBD dilation    Subjective .     History of present illness: Diane Guan is a 83 y.o. female with history of DMII, hypertension, breast cancer, anxiety/depression, hyperlipidemia, LIBBY, and hiatal hernia repair with gastropexy who presented on 10/10 with complaints of right rib and right back pain following a fall.  Her hospitalization has been complicated by acute respiratory failure.  She has been diagnosed with pneumonia and rhinovirus.  Currently intubated and sedated in the ICU.  She also has Klebsiella/E. coli UTI and has developed sepsis.  Infectious disease is following and are managing antibiotics.  She is on pressors.  GI has been asked to consult due to abnormal imaging showing CBD dilation.  The patient is unable to supplement history secondary to intubation and sedation.  Therefore, history is supplemented via chart review and from family member present at bedside.  The patient has no previous history of elevated LFTs, hepatitis, or jaundice.  She had not been complaining of any diarrhea or overt GI bleeding prior to admission.  No regular dysphagia or nausea/vomiting.  Currently, the patient is tolerating enteral feeds at 30 cc/h.      Endo History:  7/2018 EGD/colonoscopy (Dr. Boo) -large hiatal hernia, diverticulosis    Past Medical History:  Past Medical History:   Diagnosis Date    Acute bronchitis due to human metapneumovirus 02/08/2020    Anxiety disorder     Arthritis     Breast cancer 02/2019    Invasive ductal carcinoma    Chronic lymphocytic leukemia (CLL), B-cell 01/2019    Depression     Disease of thyroid gland     Diverticulosis of colon 2018    Identified on colonoscopy    Dysphagia 12/2020    Dyspnea on exertion     Fall at home 10/11/2023    Hemorrhoid     Hiatal hernia 12/2020    Hiatal hernia with GERD     large hiatal hernia with high-grade reflux on barium swallow; s/p laparoscopic fundoplication  with gastropexy    History of breast cancer 10/11/2023    History of cigarette smoking 10/11/2023    History of diabetes mellitus     no meds now    Hyperlipidemia     Hypertension     Mycobacterium avium complex 2019    aspiration pneumonia; completed abx therapy    OAB (overactive bladder)     Personal history of CLL (chronic lymphocytic leukemia) 10/11/2023    Skin cancer     Sleep apnea     daughter states pt does not have and doesn't have machine       Past Surgical History:  Past Surgical History:   Procedure Laterality Date    BLADDER SURGERY      BREAST BIOPSY      BRONCHOSCOPY N/A 2019    Procedure: BRONCHOSCOPY with bronchial washing;  Surgeon: Marnie Frankel MD;  Location: [facility] ENDOSCOPY;  Service: Pulmonary    BRONCHOSCOPY Bilateral 10/18/2023    Procedure: BRONCHOSCOPY AT BEDSIDE;  Surgeon: Vinicius Heredia DO;  Location: [facility] ENDOSCOPY;  Service: Pulmonary;  Laterality: Bilateral;    COLONOSCOPY  2018    severe diverticulosis    CYST REMOVAL      Removed a fatty cyst off of her back    ENDOSCOPY N/A 2020    Procedure: ESOPHAGOGASTRODUODENOSCOPY with dilatation (Bougie # 48, 50, 52, 54, 56, 58);  Surgeon: Den Plummer DO;  Location: [facility] ENDOSCOPY;  Service: General;  Laterality: N/A;  Post: large hiatal hernia, joshua's ulcers    HIATAL HERNIA REPAIR N/A 2020    Procedure: Laparoscopic hiatal hernia repair with gastropexy;  Surgeon: Den Plummer DO;  Location: [facility] MAIN OR;  Service: General;  Laterality: N/A;    MASTECTOMY Right 2019    Invasive ductal carcinoma    TUBAL ABDOMINAL LIGATION         Social History:  Social History     Tobacco Use    Smoking status: Former     Packs/day: 0.50     Years: 2.00     Additional pack years: 0.00     Total pack years: 1.00     Types: Cigarettes     Start date: 1990     Quit date: 1992     Years since quittin.8    Smokeless tobacco: Never   Vaping Use    Vaping Use: Never used   Substance Use  Topics    Alcohol use: No    Drug use: No       Family History:  Family History   Problem Relation Age of Onset    Diabetes Mother     Arthritis Other     Heart disease Other        Medications:  Medications Prior to Admission   Medication Sig Dispense Refill Last Dose    amLODIPine (NORVASC) 10 MG tablet Take 1 tablet by mouth Daily. 90 tablet 1     anastrozole (ARIMIDEX) 1 MG tablet Take 1 tablet by mouth Daily. 90 tablet 3     cetirizine (zyrTEC) 10 MG tablet Take 1 tablet by mouth every night at bedtime.       chlorthalidone (HYGROTON) 25 MG tablet TAKE 1 TABLET BY MOUTH EVERY DAY 90 tablet 1     docusate sodium (COLACE) 100 MG capsule Take 1 capsule by mouth 2 (Two) Times a Day.  3     ferrous sulfate 324 (65 Fe) MG tablet delayed-release EC tablet Take 1 tablet by mouth 3 (Three) Times a Week.       irbesartan (AVAPRO) 300 MG tablet TAKE 1 TABLET BY MOUTH EVERY DAY 90 tablet 1     levothyroxine (SYNTHROID, LEVOTHROID) 100 MCG tablet One tab po q day 90 tablet 3     metFORMIN (GLUCOPHAGE) 500 MG tablet Take 1 tablet by mouth Daily With Breakfast. 90 tablet 3     metoclopramide (REGLAN) 5 MG tablet Take 1 tablet by mouth 3 (Three) Times a Day As Needed (for nausea and vomiting). 270 tablet 3     mirtazapine (REMERON) 15 MG tablet Take 1 tablet by mouth every night at bedtime. 90 tablet 1     pantoprazole (PROTONIX) 40 MG EC tablet TAKE 1 TABLET BY MOUTH EVERY MORNING BEFORE BREAKFAST. TAKE ON AN EMPTY STOMACH! 90 tablet 1     rOPINIRole (REQUIP) 0.25 MG tablet Take 1 tablet by mouth Every Night. Take 1 hour before bedtime. 90 tablet 3     rosuvastatin (CRESTOR) 10 MG tablet Take 1 tablet by mouth Every Night. 90 tablet 3     sertraline (ZOLOFT) 100 MG tablet Take 1.5 tablets by mouth Daily. 45 tablet 11     Accu-Chek Softclix Lancets lancets 1 each by Other route Daily. Use as instructed 100 each 0     Alcohol Swabs ([employer] Alcohol Prep Pads) 70 % pads APPLY 1 EACH TOPICALLY DAILY.       glucose blood (Accu-Chek  Guide) test strip 1 each by Other route Daily. Use as instructed 100 each 0     Hearing Aid Batteries misc 1 Units Every 7 (Seven) Days. 52 each 0     Isopropyl Alcohol (Alcohol Wipes) 70 % misc Apply 1 each topically Daily. 100 each 0        Scheduled Meds:acetylcysteine, 3 mL, Nebulization, TID  budesonide, 0.5 mg, Nebulization, Q12H  chlorothiazide, 500 mg, Intravenous, Q12H  enoxaparin, 30 mg, Subcutaneous, Daily  ferrous sulfate, 300 mg, Nasogastric, Once per day on Mon Wed Fri  guaiFENesin, 400 mg, Nasogastric, Q8H  insulin lispro, 2-7 Units, Subcutaneous, Q6H  ipratropium-albuterol, 3 mL, Nebulization, 4x Daily - RT  lansoprazole, 30 mg, Nasogastric, Q AM  levothyroxine, 100 mcg, Nasogastric, Q AM  metoclopramide, 5 mg, Intravenous, Q8H  piperacillin-tazobactam, 4.5 g, Intravenous, Q8H  polyethylene glycol, 17 g, Nasogastric, BID  rosuvastatin, 10 mg, Nasogastric, Nightly  senna-docusate sodium, 1 tablet, Nasogastric, BID  sertraline, 150 mg, Nasogastric, Daily  sodium chloride, 10 mL, Intravenous, Q12H  sodium chloride, 10 mL, Intravenous, Q12H      Continuous Infusions:bumetanide, 1 mg/hr, Last Rate: 1 mg/hr (10/19/23 0817)  HYDROmorphone 1 mg/ml, 0.2-3 mg/hr, Last Rate: 2.5 mg/hr (10/19/23 0819)  ketamine (KETALAR) 500 mg/500 mL ns infusion, 0.06-2.5 mg/kg/hr, Last Rate: 0.8 mg/kg/hr (10/19/23 0924)  midazolam, 1-10 mg/hr  phenylephrine, 0.5-3 mcg/kg/min, Last Rate: 0.25 mcg/kg/min (10/18/23 1628)      PRN Meds:.  acetaminophen    senna-docusate sodium **AND** polyethylene glycol **AND** bisacodyl **AND** bisacodyl    cyclobenzaprine    dextrose    dextrose    dextrose    fentaNYL citrate (PF)    glucagon (human recombinant)    hydrALAZINE    ipratropium-albuterol    lidocaine    midazolam    nitroglycerin    ondansetron    oxyCODONE    [COMPLETED] Insert Peripheral IV **AND** sodium chloride    sodium chloride    sodium chloride    sodium chloride    sodium chloride    ALLERGIES:  Patient has no known  allergies.    ROS:  Unobtainable secondary to intubation and sedation.    Objective     Vital Signs:   Vitals:    10/19/23 0757 10/19/23 1145 10/19/23 1148 10/19/23 1151   BP: 93/55      Pulse: 71 71 73 77   Resp: 20  27 27   Temp: 98.4 °F (36.9 °C) 97.7 °F (36.5 °C)     TempSrc: Oral Oral     SpO2: 98% 97% 96% 92%   Weight:       Height:           Physical Exam:       General Appearance:    Awake and alert, intubated, sedated, ill-appearing in bed, on pressors   Head:    Normocephalic, without obvious abnormality, atraumatic   Throat:   No oral lesions, no thrush, oral mucosa moist   Lungs:     Respirations regular, even and unlabored, intubated   Chest Wall:    No abnormalities observed   Abdomen:     Soft, non-tender, no rebound or guarding, non-distended, enteral feeds via NG tube at 30 cc/h   Rectal:     Deferred   Extremities:   Moves all extremities, no edema, no cyanosis   Pulses:   Pulses palpable and equal bilaterally   Skin:   No rash, no jaundice, normal palpation   Lymph nodes:   No cervical, supraclavicular or submandibular palpable adenopathy   Neurologic:   Cranial nerves 2 - 12 grossly intact, no asterixis       Results Review:   I reviewed the patient's labs and imaging.  CBC    Results from last 7 days   Lab Units 10/19/23  0413 10/18/23  0453 10/17/23  0424 10/16/23  0429 10/15/23  0616 10/14/23  0753 10/13/23  2356   WBC 10*3/mm3 34.20* 30.20* 14.40* 17.40* 16.10*  --  28.80*   HEMOGLOBIN g/dL 9.4* 8.9* 10.3* 10.3* 9.9*  --  11.1*   HEMOGLOBIN, POC g/dL  --   --   --   --   --  12.5  --    PLATELETS 10*3/mm3 195 166 122* 131* 131*  --  148     CMP   Results from last 7 days   Lab Units 10/19/23  1204 10/19/23  0413 10/18/23  0453 10/17/23  0424 10/16/23  0429 10/15/23  0616 10/14/23  1620 10/14/23  0425 10/13/23  8586   SODIUM mmol/L  --  140 138 143 143 142  --  139 136   POTASSIUM mmol/L 3.8 3.1* 3.6 3.7 4.2 5.0 5.2 6.0* 5.6*   CHLORIDE mmol/L  --  102 99 103 106 107  --  104 105   CO2 mmol/L   --  23.0 24.0 24.0 23.0 25.0  --  22.0 18.0*   BUN mg/dL  --  62* 61* 65* 60* 52*  --  49* 47*   CREATININE mg/dL  --  1.96* 2.08* 1.54* 1.48* 1.43*  --  1.55* 1.46*   GLUCOSE mg/dL  --  127* 147* 183* 131* 136*  --  186* 150*   ALBUMIN g/dL  --  2.8* 3.0* 3.3*  --   --   --   --   --    BILIRUBIN mg/dL  --  0.2 0.2 0.3  --   --   --   --   --    ALK PHOS U/L  --  218* 97 110  --   --   --   --   --    AST (SGOT) U/L  --  106* 23 11  --   --   --   --   --    ALT (SGPT) U/L  --  63* 11 9  --   --   --   --   --    MAGNESIUM mg/dL  --  1.9 2.0 1.7  --  1.9  --   --   --    PHOSPHORUS mg/dL  --  3.6 4.2 3.9  --  3.9  --   --   --      Cr Clearance Estimated Creatinine Clearance: 23.1 mL/min (A) (by C-G formula based on SCr of 1.96 mg/dL (H)).  Coag     HbA1C   Lab Results   Component Value Date    HGBA1C 6.50 (H) 10/13/2023    HGBA1C 6.00 (H) 05/15/2023    HGBA1C 6.9 (H) 08/24/2022     Blood Glucose   Glucose   Date/Time Value Ref Range Status   10/19/2023 1157 130 (H) 70 - 105 mg/dL Final     Comment:     Serial Number: [id number]:  [id number]   10/19/2023 0525 100 70 - 105 mg/dL Final     Comment:     Serial Number: [id number]:  [id number]   10/18/2023 2328 100 70 - 105 mg/dL Final     Comment:     Serial Number: [id number]:  [id number]   10/18/2023 1806 108 (H) 70 - 105 mg/dL Final     Comment:     Serial Number: [id number]:  [id number]   10/18/2023 1526 97 70 - 105 mg/dL Final     Comment:     Serial Number: [id number]:  [id number]   10/18/2023 0338 148 (H) 74 - 100 mg/dL Final     Comment:     Serial Number: [id number]:  [id number]   10/18/2023 0050 140 (H) 70 - 105 mg/dL Final     Comment:     Serial Number: [id number]:  [id number]   10/17/2023 1717 114 (H) 70 - 105 mg/dL Final     Comment:     Serial Number: [id number]:  [id number]     Infection   Results from last 7 days   Lab Units 10/18/23  1440 10/18/23  1437 10/15/23  1354 10/14/23  1620 10/14/23  1342   BLOODCX    --   --   --  No growth at 4 days  --    RESPCX  Light growth (2+) The culture consists of normal respiratory franci. This is a preliminary report; final report to follow.  --  Scant growth (1+) Normal respiratory franci. No S. aureus or Pseudomonas aeruginosa detected. Final report.  --   --    URINECX   --   --   --   --  >100,000 CFU/mL Klebsiella pneumoniae ssp pneumoniae*  >100,000 CFU/mL Escherichia coli*   PROCALCITONIN ng/mL  --  0.34*  --  0.13  --      UA    Results from last 7 days   Lab Units 10/14/23  1342   NITRITE UA  Negative   WBC UA /HPF 21-50*   BACTERIA UA /HPF 4+*   SQUAM EPITHEL UA /HPF 0-2   URINECX  >100,000 CFU/mL Klebsiella pneumoniae ssp pneumoniae*  >100,000 CFU/mL Escherichia coli*     Radiology(recent) US Abdomen Limited    Result Date: 10/19/2023  Impression: 1. Dilated common bile duct measuring 14 mm. Correlate for clinical evidence of biliary obstruction and consider MRCP for further assessment. 2. Distended gallbladder with mild layering sludge. No gallbladder wall thickening or pericholecystic inflammation. 3. Left hepatic lobe 2 cm hyperechoic lesion suggesting hemangioma. 4. Right lower pole renal cyst measuring 1.2 cm. Electronically Signed: Pavan Amezquita MD  10/19/2023 7:36 AM EDT  Workstation ID: [id number]    CT Abdomen Pelvis Without Contrast    Result Date: 10/18/2023  Impression: Please see CT chest report for description of abnormalities of the lungs. Moderately distended gallbladder and bile duct dilatation. Correlation with gallbladder ultrasound may be useful. Diverticulosis without acute diverticulitis. Electronically Signed: Rosa Melendez MD  10/18/2023 2:26 PM EDT  Workstation ID: [id number]    CT Chest Without Contrast Diagnostic    Result Date: 10/18/2023  Impression: Small effusions. Areas of infiltrate likely represent combination of atelectasis and pneumonia. Electronically Signed: Rosa Melendez MD  10/18/2023 2:22 PM EDT  Workstation ID: [id number]    XR Chest 1  View    Result Date: 10/18/2023  Impression: 1. ET tube is in satisfactory position in the midthoracic trachea. 2.Persistent dense retrocardiac left lower lobe atelectasis or pneumonia and mild right basilar atelectasis 3. Stable small left pleural effusion Electronically Signed: Marta Carrillo MD  10/18/2023 11:01 AM EDT  Workstation ID: [id number]    XR Chest 1 View    Result Date: 10/18/2023  Impression: 1. Pulmonary vascular congestion and mild pulmonary edema with small bilateral pleural effusions and bibasilar atelectasis. Overall similar compared to prior. 2. NG tube and ET tube in satisfactory position. Electronically Signed: Levon Berumen MD  10/18/2023 7:10 AM EDT  Workstation ID: [id number]    XR Chest 1 View    Result Date: 10/17/2023  Impression: 1. Small right pleural effusion and trace left pleural effusion, increased from prior. 2. Cardiomegaly with mild pulmonary vascular congestion. 3. Tubes and lines as above. Electronically Signed: David Chester MD  10/17/2023 6:02 PM EDT  Workstation ID: [id number]        ASSESSMENT:  -Elevated LFTs  -Abnormal imaging showing CBD dilation, distended gallbladder, and gallbladder sludge  -Normocytic anemia  -Leukocytosis  -Acute respiratory failure s/p intubation  -Pneumonia  -Klebsiella/E. coli UTI  -Rhinovirus  -DMII  -Hypertension  -Hyperlipidemia  -History of breast cancer  -Anxiety/depression  -LIBBY  -History of hiatal hernia repair with gastropexy    PLAN:  Patient is an 83-year-old female with history of DMII and hiatal hernia repair with gastropexy who presented on 10/10 with complaints of right rib and right back pain following a fall.  Her hospitalization has been complicated by acute respiratory failure secondary to pneumonia and rhinovirus.  She also has sepsis and Klebsiella/E. coli UTI.  Pulmonary and infectious disease are following.  GI was asked to consult on 10/19 due to elevated LFTs and abnormal imaging showing CBD dilation.    CT abdomen/pelvis shows  moderately distended gallbladder with CBD dilation.  RUQ US shows dilated CBD measuring 14 mm, distended gallbladder with sludge, no gallbladder wall thickening, and a left hepatic lobe hemangioma.  Slight elevation in LFTs today.  Total bilirubin 0.2, alk phos 218, , ALT 63.  Continue to monitor.  Would recommend MRCP once patient is able to travel safely for this.  May need to consider ERCP pending results.  Will send liver serologies.  Hemoglobin stable at 9.4.  Continue to monitor H/H and transfuse as needed.  Agree with enteral feeds per dietitian recommendation.  WBC count 34.2.  Infectious disease is following and are managing antibiotics.  Supportive care.      I discussed the patients findings and my recommendations with the patient.  I will discuss the case with Dr. Rao and change the plan accordingly.    We appreciate the referral.    Electronically signed by SIOMARA Decker, 10/19/23, 1:47 PM EDT.

## 2023-10-19 NOTE — PROGRESS NOTES
Nutrition Services    Patient Name: Diane Guan  YOB: 1940  MRN: [id number]  Admission date: 10/10/2023    PPE Documentation        PPE Worn By Provider Did not enter room for this encounter   PPE Worn By Patient  N/A     PROGRESS NOTE      Encounter Information: Check on for tube feeding.  Patient discussed in AM rounds.  S/P bronch 10/18.         PO Diet: NPO Diet NPO Type: Strict NPO   PO Supplements: None ordered    PO Intake:  NPO       Current nutrition support: Peptamen AF at 60 mL/hour + 30 mL/hour water flush    Nutrition support review: Documented at 30 mL/hour per EMR        Labs (reviewed below): Elevated BUN/Cr        GI Function:  Last documented BM 10/17 (x 2 days ago)   Residuals WNL       Nutrition Intervention Updates: While prone: Peptamen AF at 10 mL/hour + 10 mL/hour water flush    While supine: Peptaman AF at 60 mL/hour + 10 mL/hour water flush        Results from last 7 days   Lab Units 10/19/23  0413 10/18/23  0453 10/17/23  0424   SODIUM mmol/L 140 138 143   POTASSIUM mmol/L 3.1* 3.6 3.7   CHLORIDE mmol/L 102 99 103   CO2 mmol/L 23.0 24.0 24.0   BUN mg/dL 62* 61* 65*   CREATININE mg/dL 1.96* 2.08* 1.54*   CALCIUM mg/dL 8.6 8.6 9.3   BILIRUBIN mg/dL 0.2 0.2 0.3   ALK PHOS U/L 218* 97 110   ALT (SGPT) U/L 63* 11 9   AST (SGOT) U/L 106* 23 11   GLUCOSE mg/dL 127* 147* 183*     Results from last 7 days   Lab Units 10/19/23  0413 10/18/23  0453 10/17/23  0424   MAGNESIUM mg/dL 1.9 2.0 1.7   PHOSPHORUS mg/dL 3.6 4.2 3.9   HEMOGLOBIN g/dL 9.4* 8.9* 10.3*   HEMATOCRIT % 30.4* 28.3* 33.8*     COVID19   Date Value Ref Range Status   10/10/2023 Not Detected Not Detected - Ref. Range Final     Lab Results   Component Value Date    HGBA1C 6.50 (H) 10/13/2023       RD to follow up per protocol.    Electronically signed by:  Vernell Gil RD  10/19/23 12:17 EDT

## 2023-10-19 NOTE — PROGRESS NOTES
Critical Care Progress Note   Diane Guan : 1940 MRN:[id number] LOS:7     Principal Problem: Acute hypoxemic respiratory failure     Reason for follow up: All the medical problems listed below    Summary     The following information has been supplemented by review of documentation, collaboration with other providers, and interview with the patient's daughter at bedside as the patient is confused at the time of examination.  The patient presented to the emergency department on 10/10/2023 for evaluation of right chest wall pain following a fall at home.  The patient's daughter reports that the patient lost her balance and fell when she got up in the dark to go to the restroom.  She apparently had preached for the light switch at which time stumbled and fell with her right side hitting the elevated toilet seat and then fell to her right side striking the floor.  There was no report of LOC and following the fall the patient was not confused.  The patient's daughter also reports that the patient had complained of a cough productive of clear secretions approximately 3 weeks ago.  On  the patient was evaluated at an urgent care facility where she was prescribed a Z-Erick however had no improvement.     On evaluation in the emergency department the patient was found to have hypoxic/hypercapnic respiratory failure which responded well to O2 therapy.  Her home dose diuretics were continued.  She was admitted for further evaluation and treatment to the hospitalist.  The patient's daughter states that at approximately 5 PM yesterday evening the patient developed worsening confusion and hallucinations.  Overnight the patient developed worsening O2 needs and hypercapnia requiring increased supplementation to BiPAP therapy.    ACP: No ACP documentation on file.  Patient is currently confused however her adult daughter is at bedside and the patient indicates that decisions can be made by her.     Significant events      10/19/23 : Afeb, VSS.  Pt down to 80% FiO2.  K 3.1, Cr 1.96, LFT's minimally elevated at 106/63.  WBC up to 34k, lymphocytic predominant.  CT's only remarkable for bile duct dilatation and bibasilar infiltrate. GB U/S showed dilated common bile duct measuring 14 mm--unable to do MRI 2/2 number of gtt's pt is requiring.  Consult GI for opinion.  Was unable to get pt sedated enough, despite multiple meds to proceed with paralytic yesterday.  Sputum Cx and PJP PCR pending. Case d/w family at bedside.        Assessment / Plan     Acute respiratory failure with hypoxia and hypercapnia  Multifactorial with AE COPD, volume overload with small left pleural effusion, splinting due to pain with inspiration   Septic shock  Acute cystitis w/o hematuria--E coli & K pneumo  Likely due to fluid volume overload and mild/acute exacerbation COPD  BiPAP settings noted and adjusted as needed.  Airvo as tolerated  Close monitoring of ABG as clinically indicated.   Avoid sedating medications  Scheduled Symbicort, as needed DuoNebs  Procalcitonin normal  Echocardiogram 1/6/2021, EF 65%  Repeat echocardiogram pending  Continue chlorthalidone per home dose.  Further diuresis per nephrology  Diuresis per nephrology  Encourage I-S and flutter while awake  Pt intubated and on mechanical ventilation as of 10/15  Wean FiO2 as tolerates.  ID following and managing abx.       Diabetes melitis type II  Hold home dose metformin due to LEXUS  Accu-Cheks before meals and at bedtime with SSI for tight glycemic control  Hemoglobin A1c pending       Acute metabolic encephalopathy  Likely metabolic secondary to hypercapnia and hypoxia  Patient has not returned to their baseline at this time.    Urinalysis with reflex culture pending  TSH/free T4 pending  CT head pending       Essential hypertension  Continue home dose Norvasc  Titrate meds as needed       Acute kidney injury on CKD stage III, baseline creatinine 1.3  Acute urinary  retention  Hyperkalemia  Remains non oliguric.   Likely prerenal. Possible intrinsic component due to ATN from infection, drugs and hypotension.   Nephrology following  Renal ultrasound from 10/16 was unremarkable  Monitor Input/Output very closely.   Place Bellamy catheter for now to relieve urinary retention and close I/O monitoring  Started on Bumex gtt on 10/18       Diabetes mellitus type 2  Hold home medications due to LEXUS  SSI for tight glycemic control  Hemoglobin A1c 6.5%       Constipation with abdominal discomfort  KUB negative for obstructive pattern, moderate stool burden  Schedule bowel regimen  Dulcolax suppository x1 now, add enema if nonproductive  Continue Reglan, EKG in a.m.     Chronic and stable  Hypothyroidism--continue levothyroxine.  TSH/free T4 pending  Hyperlipidemia--continue statin therapy  Breast cancer, s/p right mastectomy.  Anastrozole since 2019--continue  CLL, chronic leukocytosis  Mycobacterium AVM complex, s/p antibiotic regimen  Falls at home/functional decline      Critical Care Time:  34 mins.      Code status:   Level Of Support Discussed With: Patient  Code Status (Patient has no pulse and is not breathing): CPR (Attempt to Resuscitate)  Medical Interventions (Patient has pulse or is breathing): Full Support       Nutrition: NPO Diet NPO Type: Strict NPO   Diet, Tube Feeding Tube Feeding Formula: Peptamen AF (Vital AF 1.2); Tube Feeding Type: Continuous; Continuous Tube Feeding Start Rate (mL/hr): 10; Then Advance Rate By (mL/hr): Do Not Advance; Every __ Hours: Patient at Goal Rate; To Goal Rate of...    DVT prophylaxis:  Medical and mechanical DVT prophylaxis orders are present.     Subjective / Review of systems     Review of Systems   Unable to be obtained 2/2 pt's current condition.  Objective / Physical Exam   Vital signs:  Temp: 98.4 °F (36.9 °C)  BP: 93/55  Heart Rate: 71  Resp: 20  SpO2: 98 %  Weight: 83.2 kg (183 lb 6.8 oz)    Admission Weight: Weight: 78.5 kg (173  lb)  Current Weight: Weight: 83.2 kg (183 lb 6.8 oz)    Input/Output in last 24 hours:    Intake/Output Summary (Last 24 hours) at 10/19/2023 1143  Last data filed at 10/19/2023 0530  Gross per 24 hour   Intake 5171 ml   Output 2725 ml   Net 2446 ml      Physical Exam     GEN:  Elderly woman, intubated, sedated, on vent.  NEURO:  Brainstem reflexes intact.  No obvious focal deficit.  Moves all 4 ext.  HEENT:  N/AT.  PERRL.  MMM.  Oropharynx non-erythematous.  No drainage from the eyes/ears/nose.  No conjunctival petechiae.  No oral thrush. ETT in place.  NECK:  Supple, NT, trachea midline.  No meningismus.    CHEST/LUNGS:  Breath sounds are diminished but clear.  Chest excursion equal bilaterally.    CARDIOVASCULAR:  RRR w/o murmur noted.  GI:  Abdomen soft, NT, ND, +BS.   :  External urinary catheter in place.  EXTREMITIES:  No deformity or amputation.  No cyanosis, trace edema, no asymmetry.  Pulses 2+ and equal in BLE's.    SKIN:  Warm, dry, and pink.  No rash, breakdown, or track marks noted.  LYMPHATICS/HEME:  No overt LAD or abnormal bruising.  No lymphedema.  MSK:  Normal ROM.  No joint abnormalities noted.  Strength is 5/5 and equal in BUE and BLE's.  PSYCH:  Unable to be obtained 2/2 pt's current condition.          Radiology and Labs     Results from last 7 days   Lab Units 10/19/23  0413 10/18/23  0453 10/17/23  0424 10/16/23  0429 10/15/23  0616   WBC 10*3/mm3 34.20* 30.20* 14.40* 17.40* 16.10*   HEMATOCRIT % 30.4* 28.3* 33.8* 33.3* 32.8*   PLATELETS 10*3/mm3 195 166 122* 131* 131*      Results from last 7 days   Lab Units 10/19/23  0413 10/18/23  0453 10/17/23  0424 10/16/23  0429 10/15/23  0616   SODIUM mmol/L 140 138 143 143 142   POTASSIUM mmol/L 3.1* 3.6 3.7 4.2 5.0   CHLORIDE mmol/L 102 99 103 106 107   CO2 mmol/L 23.0 24.0 24.0 23.0 25.0   BUN mg/dL 62* 61* 65* 60* 52*   CREATININE mg/dL 1.96* 2.08* 1.54* 1.48* 1.43*      Current medications   Scheduled Meds: acetylcysteine, 3 mL, Nebulization,  TID  budesonide, 0.5 mg, Nebulization, Q12H  chlorothiazide, 500 mg, Intravenous, Q12H  enoxaparin, 30 mg, Subcutaneous, Daily  ferrous sulfate, 300 mg, Nasogastric, Once per day on Mon Wed Fri  guaiFENesin, 400 mg, Nasogastric, Q8H  insulin lispro, 2-7 Units, Subcutaneous, Q6H  ipratropium-albuterol, 3 mL, Nebulization, 4x Daily - RT  lansoprazole, 30 mg, Nasogastric, Q AM  levothyroxine, 100 mcg, Nasogastric, Q AM  metoclopramide, 5 mg, Intravenous, Q8H  piperacillin-tazobactam, 3.375 g, Intravenous, Q8H  polyethylene glycol, 17 g, Nasogastric, BID  rosuvastatin, 10 mg, Nasogastric, Nightly  senna-docusate sodium, 1 tablet, Nasogastric, BID  sertraline, 150 mg, Nasogastric, Daily  sodium chloride, 10 mL, Intravenous, Q12H  sodium chloride, 10 mL, Intravenous, Q12H      Continuous Infusions: bumetanide, 1 mg/hr, Last Rate: 1 mg/hr (10/19/23 0817)  HYDROmorphone 1 mg/ml, 0.2-3 mg/hr, Last Rate: 2.5 mg/hr (10/19/23 0819)  ketamine (KETALAR) 500 mg/500 mL ns infusion, 0.06-2.5 mg/kg/hr, Last Rate: 0.8 mg/kg/hr (10/19/23 0924)  phenylephrine, 0.5-3 mcg/kg/min, Last Rate: 0.25 mcg/kg/min (10/18/23 1628)        Plan discussed with RN. Reviewed all other data in the last 24 hours, including but not limited to vitals, labs, microbiology, imaging and pertinent notes from other providers.     Vinicius Heredia, DO   Critical Care  10/19/23   11:43 EDT

## 2023-10-20 ENCOUNTER — APPOINTMENT (OUTPATIENT)
Dept: CARDIOLOGY | Facility: HOSPITAL | Age: 83
DRG: 207 | End: 2023-10-20
Payer: MEDICARE

## 2023-10-20 ENCOUNTER — INPATIENT HOSPITAL (AMBULATORY)
Dept: URBAN - METROPOLITAN AREA HOSPITAL 84 | Facility: HOSPITAL | Age: 83
End: 2023-10-20
Payer: MEDICARE

## 2023-10-20 DIAGNOSIS — K83.8 OTHER SPECIFIED DISEASES OF BILIARY TRACT: ICD-10-CM

## 2023-10-20 DIAGNOSIS — D72.829 ELEVATED WHITE BLOOD CELL COUNT, UNSPECIFIED: ICD-10-CM

## 2023-10-20 DIAGNOSIS — R94.5 ABNORMAL RESULTS OF LIVER FUNCTION STUDIES: ICD-10-CM

## 2023-10-20 DIAGNOSIS — K82.8 OTHER SPECIFIED DISEASES OF GALLBLADDER: ICD-10-CM

## 2023-10-20 DIAGNOSIS — R93.2 ABNORMAL FINDINGS ON DIAGNOSTIC IMAGING OF LIVER AND BILIARY: ICD-10-CM

## 2023-10-20 DIAGNOSIS — D64.9 ANEMIA, UNSPECIFIED: ICD-10-CM

## 2023-10-20 LAB
ALBUMIN SERPL-MCNC: 2.9 G/DL (ref 3.5–5.2)
ALBUMIN/GLOB SERPL: 0.9 G/DL
ALP SERPL-CCNC: 203 U/L (ref 39–117)
ALT SERPL W P-5'-P-CCNC: 49 U/L (ref 1–33)
ANA SER QL: NEGATIVE
ANION GAP SERPL CALCULATED.3IONS-SCNC: 14 MMOL/L (ref 5–15)
ARTERIAL PATENCY WRIST A: ABNORMAL
AST SERPL-CCNC: 44 U/L (ref 1–32)
ATMOSPHERIC PRESS: ABNORMAL MM[HG]
BACTERIA SPEC RESP CULT: NORMAL
BASE EXCESS BLDA CALC-SCNC: -1.8 MMOL/L (ref 0–3)
BDY SITE: ABNORMAL
BH CV ECHO MEAS - AI P1/2T: 685.5 MSEC
BH CV ECHO MEAS - AO MAX PG: 22.5 MMHG
BH CV ECHO MEAS - AO MEAN PG: 11 MMHG
BH CV ECHO MEAS - AO V2 MAX: 237 CM/SEC
BH CV ECHO MEAS - AO V2 VTI: 37.7 CM
BH CV ECHO MEAS - AVA(I,D): 1.92 CM2
BH CV ECHO MEAS - EDV(MOD-SP4): 61.9 ML
BH CV ECHO MEAS - EF(MOD-BP): 71 %
BH CV ECHO MEAS - EF(MOD-SP4): 71.1 %
BH CV ECHO MEAS - ESV(MOD-SP4): 17.9 ML
BH CV ECHO MEAS - LV DIASTOLIC VOL/BSA (35-75): 32.7 CM2
BH CV ECHO MEAS - LV MAX PG: 7 MMHG
BH CV ECHO MEAS - LV MEAN PG: 4 MMHG
BH CV ECHO MEAS - LV SYSTOLIC VOL/BSA (12-30): 9.5 CM2
BH CV ECHO MEAS - LV V1 MAX: 132 CM/SEC
BH CV ECHO MEAS - LV V1 VTI: 23 CM
BH CV ECHO MEAS - LVOT AREA: 3.1 CM2
BH CV ECHO MEAS - LVOT DIAM: 2 CM
BH CV ECHO MEAS - PA V2 MAX: 115 CM/SEC
BH CV ECHO MEAS - RAP SYSTOLE: 3 MMHG
BH CV ECHO MEAS - RVSP: 42.9 MMHG
BH CV ECHO MEAS - SI(MOD-SP4): 23.3 ML/M2
BH CV ECHO MEAS - SV(LVOT): 72.3 ML
BH CV ECHO MEAS - SV(MOD-SP4): 44 ML
BH CV ECHO MEAS - TR MAX PG: 39.9 MMHG
BH CV ECHO MEAS - TR MAX VEL: 316 CM/SEC
BILIRUB SERPL-MCNC: 0.2 MG/DL (ref 0–1.2)
BUN SERPL-MCNC: 57 MG/DL (ref 8–23)
BUN/CREAT SERPL: 26.6 (ref 7–25)
CA-I SERPL ISE-MCNC: 1.19 MMOL/L (ref 1.2–1.3)
CALCIUM SPEC-SCNC: 8.8 MG/DL (ref 8.6–10.5)
CHLORIDE SERPL-SCNC: 103 MMOL/L (ref 98–107)
CO2 BLDA-SCNC: 26.7 MMOL/L (ref 22–29)
CO2 SERPL-SCNC: 24 MMOL/L (ref 22–29)
CREAT SERPL-MCNC: 2.14 MG/DL (ref 0.57–1)
DEPRECATED RDW RBC AUTO: 48.6 FL (ref 37–54)
EGFRCR SERPLBLD CKD-EPI 2021: 22.6 ML/MIN/1.73
ERYTHROCYTE [DISTWIDTH] IN BLOOD BY AUTOMATED COUNT: 15.7 % (ref 12.3–15.4)
GLOBULIN UR ELPH-MCNC: 3.4 GM/DL
GLUCOSE BLDC GLUCOMTR-MCNC: 208 MG/DL (ref 70–105)
GLUCOSE BLDC GLUCOMTR-MCNC: 210 MG/DL (ref 70–105)
GLUCOSE BLDC GLUCOMTR-MCNC: 216 MG/DL (ref 70–105)
GLUCOSE BLDC GLUCOMTR-MCNC: 228 MG/DL (ref 70–105)
GLUCOSE SERPL-MCNC: 253 MG/DL (ref 65–99)
GRAM STN SPEC: NORMAL
GRAM STN SPEC: NORMAL
HCO3 BLDA-SCNC: 25.1 MMOL/L (ref 21–28)
HCT VFR BLD AUTO: 31.3 % (ref 34–46.6)
HEMODILUTION: NO
HGB BLD-MCNC: 9.7 G/DL (ref 12–15.9)
INHALED O2 CONCENTRATION: 50 %
INR PPP: 1.05 (ref 0.93–1.1)
LYMPHOCYTES # BLD MANUAL: 29.25 10*3/MM3 (ref 0.7–3.1)
LYMPHOCYTES NFR BLD MANUAL: 2 % (ref 5–12)
MAGNESIUM SERPL-MCNC: 1.8 MG/DL (ref 1.6–2.4)
MCH RBC QN AUTO: 27.8 PG (ref 26.6–33)
MCHC RBC AUTO-ENTMCNC: 30.9 G/DL (ref 31.5–35.7)
MCV RBC AUTO: 89.9 FL (ref 79–97)
MITOCHONDRIA M2 IGG SER-ACNC: <20 UNITS (ref 0–20)
MODALITY: ABNORMAL
MONOCYTES # BLD: 0.82 10*3/MM3 (ref 0.1–0.9)
NEUTROPHILS # BLD AUTO: 11.12 10*3/MM3 (ref 1.7–7)
NEUTROPHILS NFR BLD MANUAL: 27 % (ref 42.7–76)
PCO2 BLDA: 51.3 MM HG (ref 35–48)
PEEP RESPIRATORY: 8 CM[H2O]
PH BLDA: 7.3 PH UNITS (ref 7.35–7.45)
PHOSPHATE SERPL-MCNC: 2.9 MG/DL (ref 2.5–4.5)
PLATELET # BLD AUTO: 220 10*3/MM3 (ref 140–450)
PMV BLD AUTO: 9.9 FL (ref 6–12)
PO2 BLDA: 73.2 MM HG (ref 83–108)
POTASSIUM SERPL-SCNC: 3.7 MMOL/L (ref 3.5–5.2)
PROT SERPL-MCNC: 6.3 G/DL (ref 6–8.5)
PROTHROMBIN TIME: 11.4 SECONDS (ref 9.6–11.7)
RBC # BLD AUTO: 3.48 10*6/MM3 (ref 3.77–5.28)
RBC MORPH BLD: NORMAL
RESPIRATORY RATE: 20
SAO2 % BLDCOA: 92.5 % (ref 94–98)
SCAN SLIDE: NORMAL
SMA IGG SER-ACNC: 2 UNITS (ref 0–19)
SMALL PLATELETS BLD QL SMEAR: ADEQUATE
SMUDGE CELLS BLD QL SMEAR: ABNORMAL
SODIUM SERPL-SCNC: 141 MMOL/L (ref 136–145)
VARIANT LYMPHS NFR BLD MANUAL: 71 % (ref 19.6–45.3)
VENTILATOR MODE: AC
VT ON VENT VENT: 450 ML
WBC NRBC COR # BLD: 41.2 10*3/MM3 (ref 3.4–10.8)

## 2023-10-20 PROCEDURE — 25010000002 HYDROMORPHONE HCL PF 50 MG/5ML SOLUTION: Performed by: NURSE PRACTITIONER

## 2023-10-20 PROCEDURE — 25010000002 PHENYLEPHRINE 10 MG/ML SOLUTION 5 ML VIAL: Performed by: NURSE PRACTITIONER

## 2023-10-20 PROCEDURE — 25010000002 PIPERACILLIN SOD-TAZOBACTAM PER 1 G: Performed by: INTERNAL MEDICINE

## 2023-10-20 PROCEDURE — 63710000001 INSULIN LISPRO (HUMAN) PER 5 UNITS: Performed by: NURSE PRACTITIONER

## 2023-10-20 PROCEDURE — 25010000002 PHENYLEPHRINE 10 MG/ML SOLUTION: Performed by: INTERNAL MEDICINE

## 2023-10-20 PROCEDURE — 94664 DEMO&/EVAL PT USE INHALER: CPT

## 2023-10-20 PROCEDURE — 93321 DOPPLER ECHO F-UP/LMTD STD: CPT | Performed by: STUDENT IN AN ORGANIZED HEALTH CARE EDUCATION/TRAINING PROGRAM

## 2023-10-20 PROCEDURE — 25010000002 PHENYLEPHRINE 10 MG/ML SOLUTION 5 ML VIAL: Performed by: INTERNAL MEDICINE

## 2023-10-20 PROCEDURE — 93321 DOPPLER ECHO F-UP/LMTD STD: CPT

## 2023-10-20 PROCEDURE — 80053 COMPREHEN METABOLIC PANEL: CPT | Performed by: STUDENT IN AN ORGANIZED HEALTH CARE EDUCATION/TRAINING PROGRAM

## 2023-10-20 PROCEDURE — 25010000002 METOCLOPRAMIDE PER 10 MG: Performed by: NURSE PRACTITIONER

## 2023-10-20 PROCEDURE — 82948 REAGENT STRIP/BLOOD GLUCOSE: CPT

## 2023-10-20 PROCEDURE — 93308 TTE F-UP OR LMTD: CPT

## 2023-10-20 PROCEDURE — 94799 UNLISTED PULMONARY SVC/PX: CPT

## 2023-10-20 PROCEDURE — 25810000003 SODIUM CHLORIDE 0.9 % SOLUTION: Performed by: INTERNAL MEDICINE

## 2023-10-20 PROCEDURE — 85610 PROTHROMBIN TIME: CPT | Performed by: NURSE PRACTITIONER

## 2023-10-20 PROCEDURE — 99232 SBSQ HOSP IP/OBS MODERATE 35: CPT | Performed by: NURSE PRACTITIONER

## 2023-10-20 PROCEDURE — 94003 VENT MGMT INPAT SUBQ DAY: CPT

## 2023-10-20 PROCEDURE — 25010000002 ENOXAPARIN PER 10 MG: Performed by: INTERNAL MEDICINE

## 2023-10-20 PROCEDURE — 25810000003 SODIUM CHLORIDE 0.9 % SOLUTION 500 ML FLEX CONT: Performed by: INTERNAL MEDICINE

## 2023-10-20 PROCEDURE — 25810000003 SODIUM CHLORIDE 0.9 % SOLUTION 250 ML FLEX CONT: Performed by: INTERNAL MEDICINE

## 2023-10-20 PROCEDURE — 25810000003 SODIUM CHLORIDE 0.9 % SOLUTION 250 ML FLEX CONT: Performed by: NURSE PRACTITIONER

## 2023-10-20 PROCEDURE — 94669 MECHANICAL CHEST WALL OSCILL: CPT

## 2023-10-20 PROCEDURE — 93325 DOPPLER ECHO COLOR FLOW MAPG: CPT

## 2023-10-20 PROCEDURE — 84100 ASSAY OF PHOSPHORUS: CPT | Performed by: STUDENT IN AN ORGANIZED HEALTH CARE EDUCATION/TRAINING PROGRAM

## 2023-10-20 PROCEDURE — 85007 BL SMEAR W/DIFF WBC COUNT: CPT | Performed by: NURSE PRACTITIONER

## 2023-10-20 PROCEDURE — 94761 N-INVAS EAR/PLS OXIMETRY MLT: CPT

## 2023-10-20 PROCEDURE — 93325 DOPPLER ECHO COLOR FLOW MAPG: CPT | Performed by: STUDENT IN AN ORGANIZED HEALTH CARE EDUCATION/TRAINING PROGRAM

## 2023-10-20 PROCEDURE — 82330 ASSAY OF CALCIUM: CPT | Performed by: STUDENT IN AN ORGANIZED HEALTH CARE EDUCATION/TRAINING PROGRAM

## 2023-10-20 PROCEDURE — 85025 COMPLETE CBC W/AUTO DIFF WBC: CPT | Performed by: NURSE PRACTITIONER

## 2023-10-20 PROCEDURE — 82803 BLOOD GASES ANY COMBINATION: CPT

## 2023-10-20 PROCEDURE — 83735 ASSAY OF MAGNESIUM: CPT | Performed by: STUDENT IN AN ORGANIZED HEALTH CARE EDUCATION/TRAINING PROGRAM

## 2023-10-20 PROCEDURE — 93308 TTE F-UP OR LMTD: CPT | Performed by: STUDENT IN AN ORGANIZED HEALTH CARE EDUCATION/TRAINING PROGRAM

## 2023-10-20 RX ORDER — LACTULOSE 10 G/15ML
20 SOLUTION ORAL ONCE
Status: DISCONTINUED | OUTPATIENT
Start: 2023-10-20 | End: 2023-10-27

## 2023-10-20 RX ORDER — INSULIN LISPRO 100 [IU]/ML
3-14 INJECTION, SOLUTION INTRAVENOUS; SUBCUTANEOUS EVERY 6 HOURS SCHEDULED
Status: DISCONTINUED | OUTPATIENT
Start: 2023-10-20 | End: 2023-10-21

## 2023-10-20 RX ORDER — POLYETHYLENE GLYCOL 3350 17 G/17G
17 POWDER, FOR SOLUTION ORAL 2 TIMES DAILY
Status: DISCONTINUED | OUTPATIENT
Start: 2023-10-20 | End: 2023-10-22

## 2023-10-20 RX ORDER — POTASSIUM CHLORIDE 1.5 G/1.58G
40 POWDER, FOR SOLUTION ORAL ONCE
Status: COMPLETED | OUTPATIENT
Start: 2023-10-20 | End: 2023-10-20

## 2023-10-20 RX ORDER — BUMETANIDE 0.25 MG/ML
2 INJECTION INTRAMUSCULAR; INTRAVENOUS EVERY 12 HOURS
Status: DISCONTINUED | OUTPATIENT
Start: 2023-10-21 | End: 2023-10-23

## 2023-10-20 RX ORDER — AMOXICILLIN 250 MG
1 CAPSULE ORAL 2 TIMES DAILY
Status: DISCONTINUED | OUTPATIENT
Start: 2023-10-20 | End: 2023-10-22

## 2023-10-20 RX ORDER — BISACODYL 10 MG
10 SUPPOSITORY, RECTAL RECTAL DAILY PRN
Status: DISCONTINUED | OUTPATIENT
Start: 2023-10-20 | End: 2023-10-22

## 2023-10-20 RX ORDER — BISACODYL 5 MG/1
5 TABLET, DELAYED RELEASE ORAL DAILY PRN
Status: DISCONTINUED | OUTPATIENT
Start: 2023-10-20 | End: 2023-10-22

## 2023-10-20 RX ORDER — LACTULOSE 10 G/15ML
20 SOLUTION ORAL DAILY
Status: DISCONTINUED | OUTPATIENT
Start: 2023-10-20 | End: 2023-10-20

## 2023-10-20 RX ADMIN — DOCUSATE SODIUM 50 MG AND SENNOSIDES 8.6 MG 1 TABLET: 8.6; 5 TABLET, FILM COATED ORAL at 20:54

## 2023-10-20 RX ADMIN — METOCLOPRAMIDE 5 MG: 5 INJECTION, SOLUTION INTRAMUSCULAR; INTRAVENOUS at 20:54

## 2023-10-20 RX ADMIN — KETAMINE HYDROCHLORIDE 0.75 MG/KG/HR: 100 INJECTION, SOLUTION, CONCENTRATE INTRAMUSCULAR; INTRAVENOUS at 19:30

## 2023-10-20 RX ADMIN — Medication 300 MG: at 09:18

## 2023-10-20 RX ADMIN — IPRATROPIUM BROMIDE AND ALBUTEROL SULFATE 3 ML: .5; 3 SOLUTION RESPIRATORY (INHALATION) at 16:20

## 2023-10-20 RX ADMIN — INSULIN LISPRO 5 UNITS: 100 INJECTION, SOLUTION INTRAVENOUS; SUBCUTANEOUS at 13:25

## 2023-10-20 RX ADMIN — IPRATROPIUM BROMIDE AND ALBUTEROL SULFATE 3 ML: .5; 3 SOLUTION RESPIRATORY (INHALATION) at 11:20

## 2023-10-20 RX ADMIN — METOCLOPRAMIDE 5 MG: 5 INJECTION, SOLUTION INTRAMUSCULAR; INTRAVENOUS at 13:26

## 2023-10-20 RX ADMIN — ENOXAPARIN SODIUM 30 MG: 100 INJECTION SUBCUTANEOUS at 15:32

## 2023-10-20 RX ADMIN — Medication 10 ML: at 09:19

## 2023-10-20 RX ADMIN — ROSUVASTATIN 10 MG: 10 TABLET, FILM COATED ORAL at 20:54

## 2023-10-20 RX ADMIN — KETAMINE HYDROCHLORIDE 0.8 MG/KG/HR: 100 INJECTION, SOLUTION, CONCENTRATE INTRAMUSCULAR; INTRAVENOUS at 02:01

## 2023-10-20 RX ADMIN — SENNOSIDES AND DOCUSATE SODIUM 1 TABLET: 50; 8.6 TABLET ORAL at 09:19

## 2023-10-20 RX ADMIN — PIPERACILLIN AND TAZOBACTAM 4.5 G: 4; .5 INJECTION, POWDER, FOR SOLUTION INTRAVENOUS at 04:41

## 2023-10-20 RX ADMIN — LANSOPRAZOLE 30 MG: 30 TABLET, ORALLY DISINTEGRATING ORAL at 05:01

## 2023-10-20 RX ADMIN — LEVOTHYROXINE SODIUM 100 MCG: 0.1 TABLET ORAL at 05:01

## 2023-10-20 RX ADMIN — KETAMINE HYDROCHLORIDE 0.8 MG/KG/HR: 100 INJECTION, SOLUTION, CONCENTRATE INTRAMUSCULAR; INTRAVENOUS at 10:14

## 2023-10-20 RX ADMIN — BUDESONIDE 0.5 MG: 0.5 INHALANT RESPIRATORY (INHALATION) at 19:43

## 2023-10-20 RX ADMIN — POTASSIUM CHLORIDE 40 MEQ: 1.5 POWDER, FOR SOLUTION ORAL at 09:19

## 2023-10-20 RX ADMIN — SERTRALINE 150 MG: 100 TABLET, FILM COATED ORAL at 09:19

## 2023-10-20 RX ADMIN — GUAIFENESIN 400 MG: 200 TABLET ORAL at 20:54

## 2023-10-20 RX ADMIN — GUAIFENESIN 400 MG: 200 TABLET ORAL at 13:26

## 2023-10-20 RX ADMIN — PIPERACILLIN AND TAZOBACTAM 4.5 G: 4; .5 INJECTION, POWDER, FOR SOLUTION INTRAVENOUS at 13:26

## 2023-10-20 RX ADMIN — BUMETANIDE 1 MG/HR: 0.25 INJECTION INTRAMUSCULAR; INTRAVENOUS at 06:39

## 2023-10-20 RX ADMIN — INSULIN LISPRO 5 UNITS: 100 INJECTION, SOLUTION INTRAVENOUS; SUBCUTANEOUS at 18:36

## 2023-10-20 RX ADMIN — PHENYLEPHRINE HYDROCHLORIDE 0.85 MCG/KG/MIN: 10 INJECTION INTRAVENOUS at 06:39

## 2023-10-20 RX ADMIN — MIDAZOLAM HYDROCHLORIDE 6 MG/HR: 1 INJECTION, SOLUTION INTRAVENOUS at 06:39

## 2023-10-20 RX ADMIN — INSULIN LISPRO 2 UNITS: 100 INJECTION, SOLUTION INTRAVENOUS; SUBCUTANEOUS at 00:22

## 2023-10-20 RX ADMIN — POLYETHYLENE GLYCOL 3350 17 G: 17 POWDER, FOR SOLUTION ORAL at 09:19

## 2023-10-20 RX ADMIN — PIPERACILLIN AND TAZOBACTAM 4.5 G: 4; .5 INJECTION, POWDER, FOR SOLUTION INTRAVENOUS at 20:54

## 2023-10-20 RX ADMIN — BUDESONIDE 0.5 MG: 0.5 INHALANT RESPIRATORY (INHALATION) at 06:55

## 2023-10-20 RX ADMIN — INSULIN LISPRO 5 UNITS: 100 INJECTION, SOLUTION INTRAVENOUS; SUBCUTANEOUS at 23:54

## 2023-10-20 RX ADMIN — METOCLOPRAMIDE 5 MG: 5 INJECTION, SOLUTION INTRAMUSCULAR; INTRAVENOUS at 04:42

## 2023-10-20 RX ADMIN — MIDAZOLAM HYDROCHLORIDE 5 MG/HR: 1 INJECTION, SOLUTION INTRAVENOUS at 23:55

## 2023-10-20 RX ADMIN — Medication 2 TABLET: at 09:18

## 2023-10-20 RX ADMIN — GUAIFENESIN 400 MG: 200 TABLET ORAL at 04:42

## 2023-10-20 RX ADMIN — PHENYLEPHRINE HYDROCHLORIDE 1.5 MCG/KG/MIN: 10 INJECTION INTRAVENOUS at 15:52

## 2023-10-20 RX ADMIN — HYDROMORPHONE HYDROCHLORIDE 2.5 MG/HR: 10 INJECTION, SOLUTION INTRAMUSCULAR; INTRAVENOUS; SUBCUTANEOUS at 06:44

## 2023-10-20 RX ADMIN — INSULIN LISPRO 3 UNITS: 100 INJECTION, SOLUTION INTRAVENOUS; SUBCUTANEOUS at 05:01

## 2023-10-20 RX ADMIN — IPRATROPIUM BROMIDE AND ALBUTEROL SULFATE 3 ML: .5; 3 SOLUTION RESPIRATORY (INHALATION) at 07:00

## 2023-10-20 RX ADMIN — POLYETHYLENE GLYCOL 3350 17 G: 17 POWDER, FOR SOLUTION ORAL at 20:54

## 2023-10-20 NOTE — PLAN OF CARE
Goal Outcome Evaluation:    Pt remains on versed, dilaudid, Shahbaz, ketamine, and bumex gtt. Shahbaz titrated up throughout shift due to hypotension. Adequate urine output. Will continue to monitor.

## 2023-10-20 NOTE — PROGRESS NOTES
"RENAL/KCC:     LOS: 8 days    Patient Care Team:  Asia Queen APRN as PCP - General (Nurse Practitioner)  Asia Queen APRN as Nurse Practitioner (Nurse Practitioner)  Ling Bolden MD as Consulting Physician (Hematology and Oncology)    Chief Complaint:  Acute resp failure    Subjective     Interval History:   Chart reviewed  Remains on the vent - down to 50% FiO2  Remains on Shahbaz  WBC up to 41K  UOP great with 4+ L UOP on Bumex gtt and s/p Diuril  Discussed with family at bedside    Objective     Vital Sign Min/Max for last 24 hours  Temp  Min: 97.7 °F (36.5 °C)  Max: 98.5 °F (36.9 °C)   No data recorded   Pulse  Min: 69  Max: 82   Resp  Min: 20  Max: 29   SpO2  Min: 92 %  Max: 98 %   No data recorded   Weight  Min: 84.1 kg (185 lb 6.5 oz)  Max: 84.1 kg (185 lb 6.5 oz)     Flowsheet Rows      Flowsheet Row First Filed Value   Admission Height 162.6 cm (64\") Documented at 10/10/2023 1252   Admission Weight 78.5 kg (173 lb) Documented at 10/10/2023 1252            No intake/output data recorded.  I/O last 3 completed shifts:  In: 9960 [I.V.:8168; Other:726; NG/GT:1066]  Out: 6500 [Urine:6500]    Physical Exam:  GEN: Intubated, sedated  ENT: +ETT  NECK: Supple, no JVD  CHEST: Lungs are clear  CV: RRR, no M/G/R, no peripheral edema  ABD: Soft, NT, +BS  SKIN: Warm and Dry  NEURO: Sedated      WBC WBC   Date Value Ref Range Status   10/20/2023 41.20 (C) 3.40 - 10.80 10*3/mm3 Final   10/19/2023 34.20 (C) 3.40 - 10.80 10*3/mm3 Final   10/18/2023 30.20 (C) 3.40 - 10.80 10*3/mm3 Final      HGB Hemoglobin   Date Value Ref Range Status   10/20/2023 9.7 (L) 12.0 - 15.9 g/dL Final   10/19/2023 9.4 (L) 12.0 - 15.9 g/dL Final   10/18/2023 8.9 (L) 12.0 - 15.9 g/dL Final      HCT Hematocrit   Date Value Ref Range Status   10/20/2023 31.3 (L) 34.0 - 46.6 % Final   10/19/2023 30.4 (L) 34.0 - 46.6 % Final   10/18/2023 28.3 (L) 34.0 - 46.6 % Final      Platlets No results found for: \"LABPLAT\"   MCV MCV   Date Value " "Ref Range Status   10/20/2023 89.9 79.0 - 97.0 fL Final   10/19/2023 87.6 79.0 - 97.0 fL Final   10/18/2023 87.2 79.0 - 97.0 fL Final          Sodium Sodium   Date Value Ref Range Status   10/20/2023 141 136 - 145 mmol/L Final   10/19/2023 140 136 - 145 mmol/L Final   10/18/2023 138 136 - 145 mmol/L Final      Potassium Potassium   Date Value Ref Range Status   10/20/2023 3.7 3.5 - 5.2 mmol/L Final   10/19/2023 3.8 3.5 - 5.2 mmol/L Final   10/19/2023 3.1 (L) 3.5 - 5.2 mmol/L Final   10/18/2023 3.6 3.5 - 5.2 mmol/L Final      Chloride Chloride   Date Value Ref Range Status   10/20/2023 103 98 - 107 mmol/L Final   10/19/2023 102 98 - 107 mmol/L Final   10/18/2023 99 98 - 107 mmol/L Final      CO2 CO2   Date Value Ref Range Status   10/20/2023 24.0 22.0 - 29.0 mmol/L Final   10/19/2023 23.0 22.0 - 29.0 mmol/L Final   10/18/2023 24.0 22.0 - 29.0 mmol/L Final      BUN BUN   Date Value Ref Range Status   10/20/2023 57 (H) 8 - 23 mg/dL Final   10/19/2023 62 (H) 8 - 23 mg/dL Final   10/18/2023 61 (H) 8 - 23 mg/dL Final      Creatinine Creatinine   Date Value Ref Range Status   10/20/2023 2.14 (H) 0.57 - 1.00 mg/dL Final   10/19/2023 1.96 (H) 0.57 - 1.00 mg/dL Final   10/18/2023 2.08 (H) 0.57 - 1.00 mg/dL Final      Calcium Calcium   Date Value Ref Range Status   10/20/2023 8.8 8.6 - 10.5 mg/dL Final   10/19/2023 8.6 8.6 - 10.5 mg/dL Final   10/18/2023 8.6 8.6 - 10.5 mg/dL Final      PO4 No results found for: \"CAPO4\"   Albumin Albumin   Date Value Ref Range Status   10/20/2023 2.9 (L) 3.5 - 5.2 g/dL Final   10/19/2023 2.8 (L) 3.5 - 5.2 g/dL Final   10/18/2023 3.0 (L) 3.5 - 5.2 g/dL Final      Magnesium Magnesium   Date Value Ref Range Status   10/20/2023 1.8 1.6 - 2.4 mg/dL Final   10/19/2023 1.9 1.6 - 2.4 mg/dL Final   10/18/2023 2.0 1.6 - 2.4 mg/dL Final      Uric Acid No results found for: \"URICACID\"        Results Review:     I reviewed the patient's new clinical results.    budesonide, 0.5 mg, Nebulization, Q12H  [START " ON 10/21/2023] bumetanide, 2 mg, Intravenous, Q12H  calcium 500 mg vitamin D 5 mcg (200 UT), 2 tablet, Nasogastric, Daily  enoxaparin, 30 mg, Subcutaneous, Daily  ferrous sulfate, 300 mg, Nasogastric, Once per day on Mon Wed Fri  guaiFENesin, 400 mg, Nasogastric, Q8H  insulin lispro, 3-14 Units, Subcutaneous, Q6H  lansoprazole, 30 mg, Nasogastric, Q AM  levothyroxine, 100 mcg, Nasogastric, Q AM  metoclopramide, 5 mg, Intravenous, Q8H  piperacillin-tazobactam, 4.5 g, Intravenous, Q8H  polyethylene glycol, 17 g, Nasogastric, BID  potassium chloride, 40 mEq, Nasogastric, Once  rosuvastatin, 10 mg, Nasogastric, Nightly  senna-docusate sodium, 1 tablet, Nasogastric, BID  sertraline, 150 mg, Nasogastric, Daily  sodium chloride, 10 mL, Intravenous, Q12H  sodium chloride, 10 mL, Intravenous, Q12H      bumetanide, 1 mg/hr, Last Rate: 1 mg/hr (10/20/23 0639)  HYDROmorphone 1 mg/ml, 0.2-3 mg/hr, Last Rate: 2.5 mg/hr (10/20/23 0644)  ketamine (KETALAR) 500 mg/500 mL ns infusion, 0.06-2.5 mg/kg/hr, Last Rate: 0.8 mg/kg/hr (10/20/23 0201)  midazolam, 1-10 mg/hr, Last Rate: 6 mg/hr (10/20/23 0639)  phenylephrine, 0.5-6 mcg/kg/min, Last Rate: 0.85 mcg/kg/min (10/20/23 0733)        Medication Review: Reviewed    Assessment & Plan     LEXUS/CKD3  Sepsis  Fluid overload  Hypokalemia - from diuresis  Hypocalcemia  Pulmonary edema  Hypotension    Acute hypoxemic respiratory failure    Mixed anxiety and depressive disorder    Primary osteoarthritis involving multiple joints    Hiatal hernia with gastroesophageal reflux    Mixed hyperlipidemia    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    Acquired hypothyroidism    Type 2 diabetes mellitus with stage 3b chronic kidney disease, without long-term current use of insulin    Insomnia    Overactive bladder    Vitamin B 12 deficiency    Vitamin D deficiency    LIBBY (obstructive sleep apnea)    COPD (chronic obstructive pulmonary disease)    CKD (chronic kidney disease) stage 3, GFR  30-59 ml/min    History of breast cancer    Personal history of CLL (chronic lymphocytic leukemia)    History of cigarette smoking    Fall at home    Rhinovirus infection    Acute respiratory failure    Hyperkalemia    Respiratory failure    Pleural effusion    Plan: Cr 2.1 from 1.9 with 4.4L UOP.  Continue Bumex gtt for 24 hours then transition to intermittent Bumex.  Additional Diuril prn.  Replace K and Ca per tube.  Still may need CRRT/SCUF but for now continue to diurese.  Continue pressor support and scheduled Midodrine.  FC in place for urinary retention.  WBC up to 41K - Abx per ID.  GI following for possible GB/billiary issues.  Vent per Pulm.  Will follow.       Frederick Bonds MD  [facility]  10/20/23  08:50 EDT

## 2023-10-20 NOTE — PROGRESS NOTES
Infectious Diseases Progress Note      LOS: 8 days   Patient Care Team:  Asia Queen APRN as PCP - General (Nurse Practitioner)  Asia Queen APRN as Nurse Practitioner (Nurse Practitioner)  Ling Bolden MD as Consulting Physician (Hematology and Oncology)    Chief Complaint: Intubated on the ventilator    Subjective     The patient had no fever during the last 24 hours.  She remained on Shahbaz-Synephrine drip.  She remained intubated on the ventilator but currently on 50% FiO2.     Review of Systems:   Review of Systems   Unable to perform ROS: Intubated        Objective     Vital Signs  Temp:  [97.9 °F (36.6 °C)-99.8 °F (37.7 °C)] 99.8 °F (37.7 °C)  Heart Rate:  [69-82] 82  Resp:  [20-29] 26  BP: (93)/(55) 93/55  FiO2 (%):  [50 %-60 %] 50 %    Physical Exam:  Physical Exam  Constitutional:       Comments: Intubated and sedated on the ventilator   HENT:      Head: Normocephalic and atraumatic.   Eyes:      Pupils: Pupils are equal, round, and reactive to light.   Cardiovascular:      Rate and Rhythm: Normal rate and regular rhythm.   Pulmonary:      Breath sounds: Rales present.   Abdominal:      General: Abdomen is flat. Bowel sounds are normal.      Palpations: Abdomen is soft.   Musculoskeletal:      Cervical back: Neck supple.      Right lower leg: No edema.      Left lower leg: No edema.          Results Review:    I have reviewed all clinical data, test, lab, and imaging results.     Radiology  Adult Transthoracic Echo Limited W/ Cont if Necessary Per Protocol    Result Date: 10/20/2023  Normal left and right ventricular size and systolic function Indeterminate left ventricular diastolic function Aortic valve sclerosis with trace aortic regurgitation. Mild pulmonary hypertension     Cardiology    Laboratory    Results from last 7 days   Lab Units 10/20/23  0449 10/19/23  0413 10/18/23  0453 10/17/23  0424 10/16/23  0429 10/15/23  0616 10/14/23  0753 10/13/23  2356   WBC 10*3/mm3 41.20*  34.20* 30.20* 14.40* 17.40* 16.10*  --  28.80*   HEMOGLOBIN g/dL 9.7* 9.4* 8.9* 10.3* 10.3* 9.9*  --  11.1*   HEMOGLOBIN, POC g/dL  --   --   --   --   --   --  12.5  --    HEMATOCRIT % 31.3* 30.4* 28.3* 33.8* 33.3* 32.8*  --  37.6   HEMATOCRIT POC %  --   --   --   --   --   --  37*  --    PLATELETS 10*3/mm3 220 195 166 122* 131* 131*  --  148     Results from last 7 days   Lab Units 10/20/23  0449 10/19/23  1204 10/19/23  0413 10/18/23  0453 10/17/23  0424 10/16/23  0429 10/15/23  0616 10/14/23  1620 10/14/23  0425   SODIUM mmol/L 141  --  140 138 143 143 142  --  139   POTASSIUM mmol/L 3.7 3.8 3.1* 3.6 3.7 4.2 5.0   < > 6.0*   CHLORIDE mmol/L 103  --  102 99 103 106 107  --  104   CO2 mmol/L 24.0  --  23.0 24.0 24.0 23.0 25.0  --  22.0   BUN mg/dL 57*  --  62* 61* 65* 60* 52*  --  49*   CREATININE mg/dL 2.14*  --  1.96* 2.08* 1.54* 1.48* 1.43*  --  1.55*   GLUCOSE mg/dL 253*  --  127* 147* 183* 131* 136*  --  186*   ALBUMIN g/dL 2.9*  --  2.8* 3.0* 3.3*  --   --   --   --    BILIRUBIN mg/dL 0.2  --  0.2 0.2 0.3  --   --   --   --    ALK PHOS U/L 203*  --  218* 97 110  --   --   --   --    AST (SGOT) U/L 44*  --  106* 23 11  --   --   --   --    ALT (SGPT) U/L 49*  --  63* 11 9  --   --   --   --    CALCIUM mg/dL 8.8  --  8.6 8.6 9.3 9.4 9.3  --  9.3    < > = values in this interval not displayed.                 Microbiology   Microbiology Results (last 10 days)       Procedure Component Value - Date/Time    MRSA Screen, PCR (Inpatient) - Swab, Nares [[id number]]  (Normal) Collected: 10/18/23 1546    Lab Status: Final result Specimen: Swab from Nares Updated: 10/18/23 4488     MRSA PCR No MRSA Detected    Narrative:      The negative predictive value of this diagnostic test is high and should only be used to consider de-escalating anti-MRSA therapy. A positive result may indicate colonization with MRSA and must be correlated clinically.    Blood Culture - Blood, Hand, Right [[id number]]  (Normal) Collected:  10/18/23 1440    Lab Status: Preliminary result Specimen: Blood from Hand, Right Updated: 10/19/23 1500     Blood Culture No growth at 24 hours    Narrative:      Less than seven (7) mL's of blood was collected.  Insufficient quantity may yield false negative results.    AFB Culture - Wash, Bronchus [[id number]] Collected: 10/18/23 1440    Lab Status: Preliminary result Specimen: Wash from Bronchus Updated: 10/19/23 1047     AFB Stain No acid fast bacilli seen on concentrated smear    Respiratory Culture - Wash, Bronchus [[id number]] Collected: 10/18/23 1440    Lab Status: Final result Specimen: Wash from Bronchus Updated: 10/20/23 0910     Respiratory Culture Light growth (2+) Normal respiratory franci. No S. aureus or Pseudomonas aeruginosa detected. Final report.     Gram Stain Many (4+) WBCs seen      Rare (1+) Gram positive cocci in clusters    Blood Culture - Blood, Hand, Right [[id number]]  (Normal) Collected: 10/18/23 1437    Lab Status: Preliminary result Specimen: Blood from Hand, Right Updated: 10/19/23 1500     Blood Culture No growth at 24 hours    Narrative:      Less than seven (7) mL's of blood was collected.  Insufficient quantity may yield false negative results.    Respiratory Culture - Sputum, ET Suction [[id number]] Collected: 10/15/23 1354    Lab Status: Final result Specimen: Sputum from ET Suction Updated: 10/18/23 1030     Respiratory Culture Scant growth (1+) Normal respiratory franci. No S. aureus or Pseudomonas aeruginosa detected. Final report.     Gram Stain Moderate (3+) WBCs per low power field      Few (2+) Gram positive cocci in clusters    Blood Culture - Blood, Hand, Left [[id number]]  (Normal) Collected: 10/14/23 1620    Lab Status: Final result Specimen: Blood from Hand, Left Updated: 10/19/23 1701     Blood Culture No growth at 5 days    Urine Culture - Urine, Straight Cath [[id number]]  (Abnormal)  (Susceptibility) Collected: 10/14/23 1342    Lab Status: Final result Specimen: Urine  from Straight Cath Updated: 10/17/23 0224     Urine Culture >100,000 CFU/mL Klebsiella pneumoniae ssp pneumoniae      >100,000 CFU/mL Escherichia coli    Narrative:      Colonization of the urinary tract without infection is common. Treatment is discouraged unless the patient is symptomatic, pregnant, or undergoing an invasive urologic procedure.    Susceptibility        Klebsiella pneumoniae ssp pneumoniae      JUAN      Ampicillin Resistant      Ampicillin + Sulbactam Susceptible      Cefazolin Susceptible      Cefepime Susceptible      Ceftazidime Susceptible      Ceftriaxone Susceptible      Gentamicin Susceptible      Levofloxacin Susceptible      Nitrofurantoin Intermediate      Piperacillin + Tazobactam Susceptible      Trimethoprim + Sulfamethoxazole Susceptible                       Susceptibility        Escherichia coli      JUAN      Ampicillin Susceptible      Ampicillin + Sulbactam Susceptible      Cefazolin Susceptible      Cefepime Susceptible      Ceftazidime Susceptible      Ceftriaxone Susceptible      Gentamicin Susceptible      Levofloxacin Susceptible      Nitrofurantoin Susceptible      Piperacillin + Tazobactam Susceptible      Trimethoprim + Sulfamethoxazole Susceptible                           Respiratory Panel PCR w/COVID-19(SARS-CoV-2) LESLEE/VIVIANE/PAVAN/PAD/COR/MAD/JUAN DAVID In-House, NP Swab in UTM/VTM, 3-4 HR TAT - Swab, Nasopharynx [[id number]]  (Abnormal) Collected: 10/10/23 1510    Lab Status: Final result Specimen: Swab from Nasopharynx Updated: 10/10/23 1613     ADENOVIRUS, PCR Not Detected     Coronavirus 229E Not Detected     Coronavirus HKU1 Not Detected     Coronavirus NL63 Not Detected     Coronavirus OC43 Not Detected     COVID19 Not Detected     Human Metapneumovirus Not Detected     Human Rhinovirus/Enterovirus Detected     Influenza A PCR Not Detected     Influenza B PCR Not Detected     Parainfluenza Virus 1 Not Detected     Parainfluenza Virus 2 Not Detected     Parainfluenza Virus 3  Not Detected     Parainfluenza Virus 4 Not Detected     RSV, PCR Not Detected     Bordetella pertussis pcr Not Detected     Bordetella parapertussis PCR Not Detected     Chlamydophila pneumoniae PCR Not Detected     Mycoplasma pneumo by PCR Not Detected    Narrative:      In the setting of a positive respiratory panel with a viral infection PLUS a negative procalcitonin without other underlying concern for bacterial infection, consider observing off antibiotics or discontinuation of antibiotics and continue supportive care. If the respiratory panel is positive for atypical bacterial infection (Bordetella pertussis, Chlamydophila pneumoniae, or Mycoplasma pneumoniae), consider antibiotic de-escalation to target atypical bacterial infection.            Medication Review:       Schedule Meds  budesonide, 0.5 mg, Nebulization, Q12H  [START ON 10/21/2023] bumetanide, 2 mg, Intravenous, Q12H  calcium 500 mg vitamin D 5 mcg (200 UT), 2 tablet, Nasogastric, Daily  enoxaparin, 30 mg, Subcutaneous, Daily  ferrous sulfate, 300 mg, Nasogastric, Once per day on Mon Wed Fri  guaiFENesin, 400 mg, Nasogastric, Q8H  insulin lispro, 3-14 Units, Subcutaneous, Q6H  lactulose, 20 g, Nasogastric, Once  lansoprazole, 30 mg, Nasogastric, Q AM  levothyroxine, 100 mcg, Nasogastric, Q AM  metoclopramide, 5 mg, Intravenous, Q8H  piperacillin-tazobactam, 4.5 g, Intravenous, Q8H  senna-docusate sodium, 1 tablet, Nasogastric, BID   And  polyethylene glycol, 17 g, Nasogastric, BID  rosuvastatin, 10 mg, Nasogastric, Nightly  sertraline, 150 mg, Nasogastric, Daily  sodium chloride, 10 mL, Intravenous, Q12H  sodium chloride, 10 mL, Intravenous, Q12H        Infusion Meds  bumetanide, 1 mg/hr, Last Rate: 1 mg/hr (10/20/23 0639)  HYDROmorphone 1 mg/ml, 0.2-3 mg/hr, Last Rate: 2.5 mg/hr (10/20/23 0644)  ketamine (KETALAR) 500 mg/500 mL ns infusion, 0.06-2.5 mg/kg/hr, Last Rate: 0.8 mg/kg/hr (10/20/23 1014)  midazolam, 1-10 mg/hr, Last Rate: 6 mg/hr  (10/20/23 0639)  phenylephrine, 0.5-6 mcg/kg/min        PRN Meds    acetaminophen    senna-docusate sodium **AND** polyethylene glycol **AND** bisacodyl **AND** bisacodyl    cyclobenzaprine    dextrose    dextrose    dextrose    fentaNYL citrate (PF)    glucagon (human recombinant)    hydrALAZINE    ipratropium-albuterol    lidocaine    midazolam    nitroglycerin    ondansetron    oxyCODONE    [COMPLETED] Insert Peripheral IV **AND** sodium chloride    sodium chloride    sodium chloride    sodium chloride    sodium chloride        Assessment & Plan       Antimicrobial Therapy   1.  IV Zosyn        2.        3.        4.        5.            Assessment     Acute respiratory failure.  Most likely secondary to hospital-acquired pneumonia.  CT scan of the chest on October 18 showed bilateral lower lobe infiltrates.  There was no additional findings consistent with PCP pneumonia  The patient is currently on ventilator on 50% FiO2 and 8 PEEP  Patient is s/p bronchoscopy and BAL cultures are negative  MRSA screen was negative     Severe lymphocytosis.  Most likely secondary to underlying CLL.  Patient was not on treatment prior to admission     History of breast cancer s/p mastectomies in the past     UTI with Klebsiella pneumoniae and E. coli.  Organisms were relatively susceptible     Diabetes mellitus type 2     Rhinovirus infection.  Nasal swab was positive for rhinovirus PCR     Plan     Continue Zosyn 3.375 g every 8 hours for a total of 7 days  Continue supportive care  A.m. labs  Droplet isolation for rhinovirus infection for total of 7 days.  Can come off isolation on October 22  The case was discussed with the patient's daughter at bedside        Damian Santos MD  10/20/23  13:22 EDT    Note is dictated utilizing voice recognition software/Dragon

## 2023-10-20 NOTE — PROGRESS NOTES
Nutrition Services    Patient Name: Diane Guan  YOB: 1940  MRN: [id number]  Admission date: 10/10/2023    PPE Documentation        PPE Worn By Provider Did not enter room for this encounter   PPE Worn By Patient  N/A     PROGRESS NOTE      Encounter Information: Check on for tube feeding.  Remains intubated.  Patient discussed in AM rounds.         PO Diet: NPO Diet NPO Type: Strict NPO   PO Supplements: None ordered    PO Intake:  NPO       Current nutrition support: Peptamen AF at 60 mL/hour + 30 mL/hour water flush   If prone: Peptamen AF at 10 mL/hour + 10 mL/hour water flush    Nutrition support review: Documented at 60 mL/hour per EMR        Labs (reviewed below): Elevated BUN/Cr   Elevated LFTs  Hyperglycemia        GI Function:  Last documented BM 10/17 (x 3 days ago) - RN to give PRN bowel regimen today   Residuals WNL       Nutrition Intervention Updates: While prone: Peptamen AF at 10 mL/hour + 10 mL/hour water flush    While supine: Peptaman AF at 60 mL/hour + 10 mL/hour water flush        Results from last 7 days   Lab Units 10/20/23  0449 10/19/23  1204 10/19/23  0413 10/18/23  0453   SODIUM mmol/L 141  --  140 138   POTASSIUM mmol/L 3.7 3.8 3.1* 3.6   CHLORIDE mmol/L 103  --  102 99   CO2 mmol/L 24.0  --  23.0 24.0   BUN mg/dL 57*  --  62* 61*   CREATININE mg/dL 2.14*  --  1.96* 2.08*   CALCIUM mg/dL 8.8  --  8.6 8.6   BILIRUBIN mg/dL 0.2  --  0.2 0.2   ALK PHOS U/L 203*  --  218* 97   ALT (SGPT) U/L 49*  --  63* 11   AST (SGOT) U/L 44*  --  106* 23   GLUCOSE mg/dL 253*  --  127* 147*     Results from last 7 days   Lab Units 10/20/23  0449 10/19/23  0413 10/18/23  0453   MAGNESIUM mg/dL 1.8 1.9 2.0   PHOSPHORUS mg/dL 2.9 3.6 4.2   HEMOGLOBIN g/dL 9.7* 9.4* 8.9*   HEMATOCRIT % 31.3* 30.4* 28.3*     COVID19   Date Value Ref Range Status   10/10/2023 Not Detected Not Detected - Ref. Range Final     Lab Results   Component Value Date    HGBA1C 6.50 (H) 10/13/2023       RD to follow up  per protocol.    Electronically signed by:  Vernell Gil RD  10/20/23 10:19 EDT

## 2023-10-20 NOTE — PROGRESS NOTES
LOS: 8 days   Patient Care Team:  Asia Queen APRN as PCP - General (Nurse Practitioner)  Asia Queen APRN as Nurse Practitioner (Nurse Practitioner)  Ling Bolden MD as Consulting Physician (Hematology and Oncology)      Subjective     Interval History:     Subjective: Patient remains intubated and sedated in the ICU.  She is tolerating tube feeds at 60 cc per per hour.  Remains on pressors.      ROS:   Unobtainable as the patient is intubated and sedated.       Medication Review:     Current Facility-Administered Medications:     acetaminophen (TYLENOL) tablet 650 mg, 650 mg, Nasogastric, Q4H PRN, Vinicius Heredia DO    sennosides-docusate (PERICOLACE) 8.6-50 MG per tablet 1 tablet, 1 tablet, Nasogastric, BID **AND** polyethylene glycol (MIRALAX) packet 17 g, 17 g, Nasogastric, BID **AND** bisacodyl (DULCOLAX) EC tablet 5 mg, 5 mg, Oral, Daily PRN **AND** bisacodyl (DULCOLAX) suppository 10 mg, 10 mg, Rectal, Daily PRN, Oli Chapman APRN    budesonide (PULMICORT) nebulizer solution 0.5 mg, 0.5 mg, Nebulization, Q12H, Day, SIOMARA Pineda, 0.5 mg at 10/20/23 0655    bumetanide (BUMEX) 25 mg/100mL (0.25 mg/mL) infusion, 1 mg/hr, Intravenous, Continuous, Frederick Bonds MD, Last Rate: 4 mL/hr at 10/20/23 0639, 1 mg/hr at 10/20/23 0639    [START ON 10/21/2023] bumetanide (BUMEX) injection 2 mg, 2 mg, Intravenous, Q12H, Frederick Bonds MD    calcium 500 mg vitamin D 5 mcg (200 UT) per tablet 2 tablet, 2 tablet, Nasogastric, Daily, Frederick Bonds MD, 2 tablet at 10/20/23 0918    cyclobenzaprine (FLEXERIL) tablet 10 mg, 10 mg, Nasogastric, TID PRN, Vinicius Heredia DO    dextrose (D50W) (25 g/50 mL) IV injection 25 g, 25 g, Intravenous, Q15 Min PRN, Kalyan Mckeon MD    dextrose (D50W) (25 g/50 mL) IV injection 50 mL, 50 mL, Intravenous, Q1H PRN, Ra Myers MD, 50 mL at 10/14/23 1235    dextrose (GLUTOSE) oral gel 15 g, 15 g, Nasogastric, Q15  Min PRN, Vinicius Heredia DO    Enoxaparin Sodium (LOVENOX) syringe 30 mg, 30 mg, Subcutaneous, Daily, Kalyan Mckeon MD, 30 mg at 10/19/23 1519    fentaNYL citrate (PF) (SUBLIMAZE) injection 25 mcg, 25 mcg, Intravenous, Q1H PRN, Day, Dawn G, APRN, 25 mcg at 10/15/23 1630    ferrous sulfate 300 (60 Fe) MG/5ML syrup 300 mg, 300 mg, Nasogastric, Once per day on Mon Wed Fri, Vinicius Heredia DO, 300 mg at 10/20/23 0918    glucagon (GLUCAGEN) injection 1 mg, 1 mg, Intramuscular, Q15 Min PRN, Kalyan Mckeon MD    guaiFENesin tablet 400 mg, 400 mg, Nasogastric, Q8H, Day, Dawn G, APRN, 400 mg at 10/20/23 0442    hydrALAZINE (APRESOLINE) injection 10 mg, 10 mg, Intravenous, Q6H PRN, Ramona Wilder APRN    HYDROmorphone (DILAUDID) 50 mg in 50 mL NS infusion, 0.2-3 mg/hr, Intravenous, Titrated, Oli Chapman APRN, Last Rate: 2.5 mL/hr at 10/20/23 0644, 2.5 mg/hr at 10/20/23 0644    insulin lispro (HUMALOG/ADMELOG) injection 3-14 Units, 3-14 Units, Subcutaneous, Q6H, Oli Chapman APRN    ipratropium-albuterol (DUO-NEB) nebulizer solution 3 mL, 3 mL, Nebulization, Q6H PRN, Day, Dawn G, APRN    ketamine (KETALAR) 500 mg/500 mL ns infusion, 0.06-2.5 mg/kg/hr, Intravenous, Titrated, Vinicius Heredia DO, Last Rate: 66.6 mL/hr at 10/20/23 1014, 0.8 mg/kg/hr at 10/20/23 1014    lactulose (CHRONULAC) 10 GM/15ML solution 20 g, 20 g, Nasogastric, Once, Oli Chapman APRN    lansoprazole (PREVACID SOLUTAB) disintegrating tablet Tablet Delayed Release Dispersible 30 mg, 30 mg, Nasogastric, Q AM, Day, Anita G, APRN, 30 mg at 10/20/23 0501    levothyroxine (SYNTHROID, LEVOTHROID) tablet 100 mcg, 100 mcg, Nasogastric, Q AM, Day, Anita G, APRN, 100 mcg at 10/20/23 0501    lidocaine (XYLOCAINE) 5 % ointment, , , PRN, Vinicius Heredia, DO, 1 application  at 10/18/23 1449    metoclopramide (REGLAN) injection 5 mg, 5 mg, Intravenous, Q8H, Day, Dawn G, APRN, 5 mg at 10/20/23 0442    midazolam (VERSED) 100 mg in  100mL NS infusion, 1-10 mg/hr, Intravenous, Titrated, Vinicius Heredia DO, Last Rate: 6 mL/hr at 10/20/23 0639, 6 mg/hr at 10/20/23 0639    midazolam (VERSED) injection 2 mg, 2 mg, Intravenous, Q2H PRN, Day, Anita G, APRN, 2 mg at 10/18/23 0834    nitroglycerin (NITROSTAT) SL tablet 0.4 mg, 0.4 mg, Sublingual, Q5 Min PRN, Kalyan Mckeon MD    ondansetron (ZOFRAN) injection 4 mg, 4 mg, Intravenous, Q6H PRN, Kalyan Mckeon MD, 4 mg at 10/12/23 0605    oxyCODONE (ROXICODONE) immediate release tablet 5 mg, 5 mg, Nasogastric, Q6H PRN, Vinicius Heredia DO    phenylephrine (AUDRA-SYNEPHRINE) 100 mg in 250 mL NS infusion, 0.5-6 mcg/kg/min, Intravenous, Titrated, Frederick Bonds MD    piperacillin-tazobactam (ZOSYN) IVPB 4.5 g in 100 mL NS (CD), 4.5 g, Intravenous, Q8H, Damian Santos MD, 4.5 g at 10/20/23 0441    rosuvastatin (CRESTOR) tablet 10 mg, 10 mg, Nasogastric, Nightly, Day, Anita G, APRN, 10 mg at 10/19/23 2113    sertraline (ZOLOFT) tablet 150 mg, 150 mg, Nasogastric, Daily, Vinicius Heredia DO, 150 mg at 10/20/23 0919    [COMPLETED] Insert Peripheral IV, , , Once **AND** sodium chloride 0.9 % flush 10 mL, 10 mL, Intravenous, PRN, Sheree Field PA-C    sodium chloride 0.9 % flush 10 mL, 10 mL, Intravenous, Q12H, Kalyan Mckeon MD, 10 mL at 10/20/23 0919    sodium chloride 0.9 % flush 10 mL, 10 mL, Intravenous, PRN, Kalyan Mckeon MD    sodium chloride 0.9 % flush 10 mL, 10 mL, Intravenous, Q12H, Day, Anita G, APRN, 10 mL at 10/20/23 0919    sodium chloride 0.9 % flush 10 mL, 10 mL, Intravenous, PRN, Day, Anita G, APRN    sodium chloride 0.9 % infusion 40 mL, 40 mL, Intravenous, PRN, Kalyan Mckeon MD    sodium chloride 0.9 % infusion 40 mL, 40 mL, Intravenous, PRN, Day, Anita G, APRN      Objective     Vital Signs  Vitals:    10/20/23 0700 10/20/23 0704 10/20/23 0800 10/20/23 1021   BP:    93/55   Pulse: 70 70  70   Resp: 20 26     Temp:   98.5 °F (36.9 °C)    TempSrc:   Oral    SpO2: 96% 96%   "   Weight:    83.9 kg (185 lb)   Height:    162.6 cm (64\")       Physical Exam:     General Appearance:   Intubated, sedated, ill-appearing in bed   Head:    Normocephalic, without obvious abnormality   Eyes:          Conjunctivae normal, anicteric sclera   Throat:   No oral lesions, no thrush, oral mucosa moist   Neck:   No adenopathy, supple, no JVD   Lungs:     respirations regular, even and unlabored, intubated   Abdomen:     Soft, non-tender, no rebound or guarding, non-distended, NG with enteral feeds at 60 cc/h   Rectal:     Deferred   Extremities:   No edema, no cyanosis   Skin:   No bruising or rash, no jaundice        Results Review:    CBC    Results from last 7 days   Lab Units 10/20/23  0449 10/19/23  0413 10/18/23  0453 10/17/23  0424 10/16/23  0429 10/15/23  0616 10/14/23  0753 10/13/23  2356   WBC 10*3/mm3 41.20* 34.20* 30.20* 14.40* 17.40* 16.10*  --  28.80*   HEMOGLOBIN g/dL 9.7* 9.4* 8.9* 10.3* 10.3* 9.9*  --  11.1*   HEMOGLOBIN, POC g/dL  --   --   --   --   --   --  12.5  --    PLATELETS 10*3/mm3 220 195 166 122* 131* 131*  --  148     CMP   Results from last 7 days   Lab Units 10/20/23  0449 10/19/23  1204 10/19/23  0413 10/18/23  0453 10/17/23  0424 10/16/23  0429 10/15/23  0616 10/14/23  1620 10/14/23  0425   SODIUM mmol/L 141  --  140 138 143 143 142  --  139   POTASSIUM mmol/L 3.7 3.8 3.1* 3.6 3.7 4.2 5.0   < > 6.0*   CHLORIDE mmol/L 103  --  102 99 103 106 107  --  104   CO2 mmol/L 24.0  --  23.0 24.0 24.0 23.0 25.0  --  22.0   BUN mg/dL 57*  --  62* 61* 65* 60* 52*  --  49*   CREATININE mg/dL 2.14*  --  1.96* 2.08* 1.54* 1.48* 1.43*  --  1.55*   GLUCOSE mg/dL 253*  --  127* 147* 183* 131* 136*  --  186*   ALBUMIN g/dL 2.9*  --  2.8* 3.0* 3.3*  --   --   --   --    BILIRUBIN mg/dL 0.2  --  0.2 0.2 0.3  --   --   --   --    ALK PHOS U/L 203*  --  218* 97 110  --   --   --   --    AST (SGOT) U/L 44*  --  106* 23 11  --   --   --   --    ALT (SGPT) U/L 49*  --  63* 11 9  --   --   --   --  "   MAGNESIUM mg/dL 1.8  --  1.9 2.0 1.7  --  1.9  --   --    PHOSPHORUS mg/dL 2.9  --  3.6 4.2 3.9  --  3.9  --   --     < > = values in this interval not displayed.     Cr Clearance Estimated Creatinine Clearance: 21.2 mL/min (A) (by C-G formula based on SCr of 2.14 mg/dL (H)).  Coag   Results from last 7 days   Lab Units 10/20/23  0449   INR  1.05     HbA1C   Lab Results   Component Value Date    HGBA1C 6.50 (H) 10/13/2023    HGBA1C 6.00 (H) 05/15/2023    HGBA1C 6.9 (H) 08/24/2022     Blood Glucose   Glucose   Date/Time Value Ref Range Status   10/20/2023 1118 228 (H) 70 - 105 mg/dL Final     Comment:     Serial Number: [id number]:  [id number]   10/20/2023 0457 210 (H) 70 - 105 mg/dL Final     Comment:     Serial Number: [id number]:  [id number]   10/19/2023 2353 193 (H) 70 - 105 mg/dL Final     Comment:     Serial Number: [id number]:  [id number]   10/19/2023 1157 130 (H) 70 - 105 mg/dL Final     Comment:     Serial Number: [id number]:  [id number]   10/19/2023 0525 100 70 - 105 mg/dL Final     Comment:     Serial Number: [id number]:  [id number]   10/18/2023 2328 100 70 - 105 mg/dL Final     Comment:     Serial Number: [id number]:  [id number]   10/18/2023 1806 108 (H) 70 - 105 mg/dL Final     Comment:     Serial Number: [id number]:  [id number]   10/18/2023 1526 97 70 - 105 mg/dL Final     Comment:     Serial Number: [id number]:  [id number]     Infection   Results from last 7 days   Lab Units 10/18/23  1440 10/18/23  1437 10/15/23  1354 10/14/23  1620 10/14/23  1342   BLOODCX  No growth at 24 hours No growth at 24 hours  --  No growth at 5 days  --    RESPCX  Light growth (2+) Normal respiratory franci. No S. aureus or Pseudomonas aeruginosa detected. Final report.  --  Scant growth (1+) Normal respiratory franci. No S. aureus or Pseudomonas aeruginosa detected. Final report.  --   --    URINECX   --   --   --   --  >100,000 CFU/mL Klebsiella pneumoniae ssp pneumoniae*   >100,000 CFU/mL Escherichia coli*   PROCALCITONIN ng/mL  --  0.34*  --  0.13  --      UA    Results from last 7 days   Lab Units 10/14/23  1342   NITRITE UA  Negative   WBC UA /HPF 21-50*   BACTERIA UA /HPF 4+*   SQUAM EPITHEL UA /HPF 0-2   URINECX  >100,000 CFU/mL Klebsiella pneumoniae ssp pneumoniae*  >100,000 CFU/mL Escherichia coli*     Radiology(recent) US Abdomen Limited    Result Date: 10/19/2023  Impression: 1. Dilated common bile duct measuring 14 mm. Correlate for clinical evidence of biliary obstruction and consider MRCP for further assessment. 2. Distended gallbladder with mild layering sludge. No gallbladder wall thickening or pericholecystic inflammation. 3. Left hepatic lobe 2 cm hyperechoic lesion suggesting hemangioma. 4. Right lower pole renal cyst measuring 1.2 cm. Electronically Signed: Pavan Amezquita MD  10/19/2023 7:36 AM EDT  Workstation ID: [id number]    CT Abdomen Pelvis Without Contrast    Result Date: 10/18/2023  Impression: Please see CT chest report for description of abnormalities of the lungs. Moderately distended gallbladder and bile duct dilatation. Correlation with gallbladder ultrasound may be useful. Diverticulosis without acute diverticulitis. Electronically Signed: Rosa Melendez MD  10/18/2023 2:26 PM EDT  Workstation ID: [id number]    CT Chest Without Contrast Diagnostic    Result Date: 10/18/2023  Impression: Small effusions. Areas of infiltrate likely represent combination of atelectasis and pneumonia. Electronically Signed: Rosa Melendez MD  10/18/2023 2:22 PM EDT  Workstation ID: [id number]        Assessment & Plan     ASSESSMENT:  -Elevated LFTs  -Abnormal imaging showing CBD dilation, distended gallbladder, and gallbladder sludge  -Normocytic anemia  -Leukocytosis  -Acute respiratory failure s/p intubation  -Pneumonia  -Klebsiella/E. coli UTI  -Rhinovirus  -DMII  -Hypertension  -Hyperlipidemia  -History of breast cancer  -Anxiety/depression  -LIBBY  -History of hiatal hernia  repair with gastropexy     PLAN:  Patient is an 83-year-old female with history of DMII and hiatal hernia repair with gastropexy who presented on 10/10 with complaints of right rib and right back pain following a fall.  Her hospitalization has been complicated by acute respiratory failure secondary to pneumonia and rhinovirus.  She also has sepsis and Klebsiella/E. coli UTI.  Pulmonary and infectious disease are following.  GI was asked to consult on 10/19 due to elevated LFTs and abnormal imaging showing CBD dilation.    Patient remains intubated and sedated on pressors in the ICU.  CT abdomen/pelvis shows moderately distended gallbladder with CBD dilation.  RUQ US shows dilated CBD measuring 14 mm, distended gallbladder with sludge, no gallbladder wall thickening, and a left hepatic lobe hemangioma.  LFTs improving with total bilirubin 0.2 from 0.2, alk phos 203 from 218, AST 44 from 106, and ALT 49 from 63.  INR normal.  Would recommend MRCP once patient is able to safely travel for imaging.  Could consider ERCP versus EUS pending results.  Liver serologies pending.  Hemoglobin remained stable at 9.7 from 9.4.  Continue to monitor H/H and transfuse as needed.  Continue enteral feeds per dietitian recommendation.  WBC count 41.2 from 34.2.  Infectious disease is following and are managing antibiotics.  As the patient is critically ill at this time, there is not much for GI to add.   We will be available if needed, but otherwise the patient can follow-up in our office after discharge.  We will see as needed.      Electronically signed by SIOMARA Decker, 10/20/23, 11:26 AM EDT.

## 2023-10-20 NOTE — CASE MANAGEMENT/SOCIAL WORK
Continued Stay Note   [facility]     Patient Name: Diane Guan  MRN: [id number]  Today's Date: 10/20/2023    Admit Date: 10/10/2023    Plan: Referral to [facility] pending. Will require precert. PASRR approved.   Discharge Plan       Row Name 10/20/23 1017       Plan    Plan Comments Multiple gtts, vent 50%/20 PEEP 8, No BM, tube feedings, Multiple gtts.                Chart review only.         Expected Discharge Date and Time       Expected Discharge Date Expected Discharge Time    Oct 25, 2023               Tejal Justin RN

## 2023-10-20 NOTE — PROGRESS NOTES
Critical Care Progress Note   Diane Guan : 1940 MRN:[id number] LOS:8     Principal Problem: Acute hypoxemic respiratory failure     Reason for follow up: All the medical problems listed below    Summary     The following information has been supplemented by review of documentation, collaboration with other providers, and interview with the patient's daughter at bedside as the patient is confused at the time of examination.  The patient presented to the emergency department on 10/10/2023 for evaluation of right chest wall pain following a fall at home.  The patient's daughter reports that the patient lost her balance and fell when she got up in the dark to go to the restroom.  She apparently had preached for the light switch at which time stumbled and fell with her right side hitting the elevated toilet seat and then fell to her right side striking the floor.  There was no report of LOC and following the fall the patient was not confused.  The patient's daughter also reports that the patient had complained of a cough productive of clear secretions approximately 3 weeks ago.  On  the patient was evaluated at an urgent care facility where she was prescribed a Z-Erick however had no improvement.     On evaluation in the emergency department the patient was found to have hypoxic/hypercapnic respiratory failure which responded well to O2 therapy.  Her home dose diuretics were continued.  She was admitted for further evaluation and treatment to the hospitalist.  The patient's daughter states that at approximately 5 PM yesterday evening the patient developed worsening confusion and hallucinations.  Overnight the patient developed worsening O2 needs and hypercapnia requiring increased supplementation to BiPAP therapy.    ACP: No ACP documentation on file.  Patient is currently confused however her adult daughter is at bedside and the patient indicates that decisions can be made by her.     Significant events      10/20/23 : Afeb, VSS.  Pt down to 50% FiO2, PEEP 8, increased respiratory rate to 22.  Repeat ABG in AM.  LFTs continue to improve.  Gastroenterology consulted on 10/19, and planned to discuss with ID, if the dilated common bile duct is a sepsis source, then an ERCP could be considered, patient not appropriate for an MRCP due to the ventilator.  Last documented bowel movement on 10/17, increased bowel regimen for twice daily MiraLAX.    Assessment / Plan     Acute respiratory failure with hypoxia and hypercapnia  Multifactorial with AE COPD, volume overload with small left pleural effusion  Likely due to fluid volume overload and mild/acute exacerbation COPD  Failed BiPAP and Airvo, required intubation on 10/15.  Initially requiring high PEEP and high FiO2 to maintain oxygenation.  Requiring heavy sedation with Versed, Dilaudid, and ketamine.  Not yet ready to start weaning sedation.  Initially planned on a paralytic initiation and possible prone positioning, but paralytic was unable to be initiated due to difficulty with obtaining an appropriate sedation level with BIS monitoring, no longer requiring paralytics.  Discussed with nephrology, continuing with aggressive diuresis.  If patient's renal function does not tolerate diuresis, may require hemodialysis, but so far remaining stable.  Scheduled Symbicort, as needed DuoNebs    Septic shock  Acute cystitis w/o hematuria--E coli & K pneumo  Rhinovirus infection  Urine culture with Klebsiella and E. Coli  RVP: +Rhinovirus  All other microbiology cultures are negative or pending.  ID managing antimicrobials.  Echocardiogram 1/6/2021, EF 65%; Repeat echocardiogram pending due to lower diastolic pressures.  Off vasopressors, shock state resolved.    Transaminitis with dilated common bile duct  CTs were reviewed,, bile duct dilatation noted.  Gallbladder ultrasound again confirmed dilated common bile duct without evidence of stone, measuring 14 mm.  Initially an MRI  was planned, but patient is unable to transport due to drips and ventilation.  Gastroenterology consulted, possible ERCP if ID feels that the dilation is a possible source of infection.  LFTs improving.  Continue to monitor daily.    Diabetes melitis type II  Hold home dose metformin due to LEXUS  Accuchecks with SSI.  Hemoglobin A1c 6.50     Acute metabolic encephalopathy/Delirium  Likely metabolic secondary to hypercapnia and hypoxia  Patient has not returned to their baseline at this time.    CT head negative for acute intracranial abnormalities     Essential hypertension  Antihypertensives on hold.  Titrate meds as needed     Acute kidney injury on CKD stage III, baseline creatinine 1.3  Acute urinary retention  Hyperkalemia  Remains non oliguric.   Likely prerenal. Possible intrinsic component due to ATN from infection, drugs and hypotension.   Nephrology following  Renal ultrasound from 10/16 was unremarkable  Monitor Input/Output very closely.   Place Bellamy catheter for now to relieve urinary retention and close I/O monitoring  Started on Bumex gtt on 10/18     Diabetes mellitus type 2  Hold home medications due to LEXUS  SSI for tight glycemic control  Hemoglobin A1c 6.5%     Constipation with abdominal discomfort  KUB negative for obstructive pattern, moderate stool burden  Schedule bowel regimen  Dulcolax suppository x1 now, add enema if nonproductive  Continue Reglan.  Last BM on 10/17, scheduled BID Miralax.    Hypothyroidism--continue levothyroxine.    Hyperlipidemia--statin therapy held due to elevated LFTs.  Breast cancer, s/p right mastectomy.  Anastrozole since 2019--continue  CLL, chronic leukocytosis  Falls at home/functional decline      Code status:   Level Of Support Discussed With: Patient  Code Status (Patient has no pulse and is not breathing): CPR (Attempt to Resuscitate)  Medical Interventions (Patient has pulse or is breathing): Full Support       Nutrition: NPO Diet NPO Type: Strict NPO    Diet, Tube Feeding Tube Feeding Formula: Peptamen AF (Vital AF 1.2); Tube Feeding Type: Continuous; Continuous Tube Feeding Start Rate (mL/hr): 10; Then Advance Rate By (mL/hr): Do Not Advance; Every __ Hours: Patient at Goal Rate; To Goal Rate of...    DVT prophylaxis:  Medical and mechanical DVT prophylaxis orders are present.     Subjective / Review of systems     Review of Systems   Unable to be obtained 2/2 pt's current condition.  Objective / Physical Exam   Vital signs:  Temp: 98.5 °F (36.9 °C)  BP: 93/55  Heart Rate: 70  Resp: 26  SpO2: 96 %  Weight: 83.9 kg (185 lb)    Admission Weight: Weight: 78.5 kg (173 lb)  Current Weight: Weight: 83.9 kg (185 lb)    Input/Output in last 24 hours:    Intake/Output Summary (Last 24 hours) at 10/20/2023 1056  Last data filed at 10/20/2023 0400  Gross per 24 hour   Intake 4759 ml   Output 4400 ml   Net 359 ml      Physical Exam     GEN:  Elderly woman, intubated, sedated, on vent.  NEURO:  Brainstem reflexes intact.  No obvious focal deficit.  Moves all 4 ext.  HEENT:  N/AT.  PERRL.  MMM.  Oropharynx non-erythematous.  No drainage from the eyes/ears/nose.  No conjunctival petechiae.  No oral thrush. ETT in place.  NECK:  Supple, NT, trachea midline.  No meningismus.    CHEST/LUNGS:  Breath sounds are diminished but clear.  Chest excursion equal bilaterally.    CARDIOVASCULAR:  RRR w/o murmur noted.  GI:  Abdomen soft, NT, ND, +BS.   :  External urinary catheter in place.  EXTREMITIES:  No deformity or amputation.  No cyanosis, trace edema, no asymmetry.  Pulses 2+ and equal in BLE's.    SKIN:  Warm, dry, and pink.  No rash, breakdown, or track marks noted.  LYMPHATICS/HEME:  No overt LAD or abnormal bruising.  No lymphedema.  MSK:  Normal ROM.  No joint abnormalities noted.  Strength is 5/5 and equal in BUE and BLE's.  PSYCH:  Unable to be obtained 2/2 pt's current condition.        Radiology and Labs     Results from last 7 days   Lab Units 10/20/23  1123  10/19/23  0413 10/18/23  0453 10/17/23  0424 10/16/23  0429   WBC 10*3/mm3 41.20* 34.20* 30.20* 14.40* 17.40*   HEMATOCRIT % 31.3* 30.4* 28.3* 33.8* 33.3*   PLATELETS 10*3/mm3 220 195 166 122* 131*      Results from last 7 days   Lab Units 10/20/23  0449 10/19/23  1204 10/19/23  0413 10/18/23  0453 10/17/23  0424 10/16/23  0429   SODIUM mmol/L 141  --  140 138 143 143   POTASSIUM mmol/L 3.7 3.8 3.1* 3.6 3.7 4.2   CHLORIDE mmol/L 103  --  102 99 103 106   CO2 mmol/L 24.0  --  23.0 24.0 24.0 23.0   BUN mg/dL 57*  --  62* 61* 65* 60*   CREATININE mg/dL 2.14*  --  1.96* 2.08* 1.54* 1.48*      Current medications   Scheduled Meds: budesonide, 0.5 mg, Nebulization, Q12H  [START ON 10/21/2023] bumetanide, 2 mg, Intravenous, Q12H  calcium 500 mg vitamin D 5 mcg (200 UT), 2 tablet, Nasogastric, Daily  enoxaparin, 30 mg, Subcutaneous, Daily  ferrous sulfate, 300 mg, Nasogastric, Once per day on Mon Wed Fri  guaiFENesin, 400 mg, Nasogastric, Q8H  insulin lispro, 3-14 Units, Subcutaneous, Q6H  lansoprazole, 30 mg, Nasogastric, Q AM  levothyroxine, 100 mcg, Nasogastric, Q AM  metoclopramide, 5 mg, Intravenous, Q8H  piperacillin-tazobactam, 4.5 g, Intravenous, Q8H  polyethylene glycol, 17 g, Nasogastric, BID  rosuvastatin, 10 mg, Nasogastric, Nightly  senna-docusate sodium, 1 tablet, Nasogastric, BID  sertraline, 150 mg, Nasogastric, Daily  sodium chloride, 10 mL, Intravenous, Q12H  sodium chloride, 10 mL, Intravenous, Q12H      Continuous Infusions: bumetanide, 1 mg/hr, Last Rate: 1 mg/hr (10/20/23 0639)  HYDROmorphone 1 mg/ml, 0.2-3 mg/hr, Last Rate: 2.5 mg/hr (10/20/23 0644)  ketamine (KETALAR) 500 mg/500 mL ns infusion, 0.06-2.5 mg/kg/hr, Last Rate: 0.8 mg/kg/hr (10/20/23 1014)  midazolam, 1-10 mg/hr, Last Rate: 6 mg/hr (10/20/23 0639)  phenylephrine, 0.5-6 mcg/kg/min      Patient continues to be critically ill, remains at risk of clinical deterioration or death and needed high complexity decision making. I have spent a total  of 38 minutes providing critical care services to this patient including but not limited to: review of labs/ microbiology/imaging/medications, serial monitoring of vital signs, adjusting ventilator settings as needed, review of other consultant's notes, review of events in the last 24 hrs, monitoring input/output, review of treatment plan with bedside nurse, RT and other treatment team, management of life support and nutrition needs. I also spoke with patient's family member about the plan of care and answered all questions.    Time spent in performing separately billable procedures and updating family is not included in the critical care time.       Oli Chapman, SIOMARA   Critical Care  10/20/23   10:56 EDT

## 2023-10-21 LAB
ALBUMIN SERPL-MCNC: 3 G/DL (ref 3.5–5.2)
ALBUMIN/GLOB SERPL: 0.9 G/DL
ALP SERPL-CCNC: 182 U/L (ref 39–117)
ALT SERPL W P-5'-P-CCNC: 30 U/L (ref 1–33)
ANION GAP SERPL CALCULATED.3IONS-SCNC: 14 MMOL/L (ref 5–15)
ANISOCYTOSIS BLD QL: ABNORMAL
ARTERIAL PATENCY WRIST A: ABNORMAL
AST SERPL-CCNC: 21 U/L (ref 1–32)
ATMOSPHERIC PRESS: ABNORMAL MM[HG]
BASE EXCESS BLDA CALC-SCNC: -1.5 MMOL/L (ref 0–3)
BDY SITE: ABNORMAL
BILIRUB SERPL-MCNC: 0.2 MG/DL (ref 0–1.2)
BUN SERPL-MCNC: 66 MG/DL (ref 8–23)
BUN/CREAT SERPL: 28.4 (ref 7–25)
CA-I SERPL ISE-MCNC: 1.16 MMOL/L (ref 1.2–1.3)
CALCIUM SPEC-SCNC: 9 MG/DL (ref 8.6–10.5)
CHLORIDE SERPL-SCNC: 105 MMOL/L (ref 98–107)
CO2 BLDA-SCNC: 27.1 MMOL/L (ref 22–29)
CO2 SERPL-SCNC: 24 MMOL/L (ref 22–29)
CREAT SERPL-MCNC: 2.32 MG/DL (ref 0.57–1)
DEPRECATED RDW RBC AUTO: 50.8 FL (ref 37–54)
EGFRCR SERPLBLD CKD-EPI 2021: 20.5 ML/MIN/1.73
ERYTHROCYTE [DISTWIDTH] IN BLOOD BY AUTOMATED COUNT: 16.1 % (ref 12.3–15.4)
GLOBULIN UR ELPH-MCNC: 3.3 GM/DL
GLUCOSE BLDC GLUCOMTR-MCNC: 214 MG/DL (ref 70–105)
GLUCOSE BLDC GLUCOMTR-MCNC: 230 MG/DL (ref 70–105)
GLUCOSE BLDC GLUCOMTR-MCNC: 255 MG/DL (ref 70–105)
GLUCOSE SERPL-MCNC: 232 MG/DL (ref 65–99)
HCO3 BLDA-SCNC: 25.5 MMOL/L (ref 21–28)
HCT VFR BLD AUTO: 31.8 % (ref 34–46.6)
HEMODILUTION: NO
HGB BLD-MCNC: 9.6 G/DL (ref 12–15.9)
INHALED O2 CONCENTRATION: 50 %
LYMPHOCYTES # BLD MANUAL: 30.02 10*3/MM3 (ref 0.7–3.1)
LYMPHOCYTES NFR BLD MANUAL: 4 % (ref 5–12)
MAGNESIUM SERPL-MCNC: 1.8 MG/DL (ref 1.6–2.4)
MCH RBC QN AUTO: 27.1 PG (ref 26.6–33)
MCHC RBC AUTO-ENTMCNC: 30.1 G/DL (ref 31.5–35.7)
MCV RBC AUTO: 90.1 FL (ref 79–97)
MODALITY: ABNORMAL
MONOCYTES # BLD: 1.88 10*3/MM3 (ref 0.1–0.9)
NEUTROPHILS # BLD AUTO: 15.01 10*3/MM3 (ref 1.7–7)
NEUTROPHILS NFR BLD MANUAL: 30 % (ref 42.7–76)
NEUTS BAND NFR BLD MANUAL: 2 % (ref 0–5)
PCO2 BLDA: 52.1 MM HG (ref 35–48)
PEEP RESPIRATORY: 8 CM[H2O]
PH BLDA: 7.3 PH UNITS (ref 7.35–7.45)
PHOSPHATE SERPL-MCNC: 4.6 MG/DL (ref 2.5–4.5)
PLAT MORPH BLD: NORMAL
PLATELET # BLD AUTO: 269 10*3/MM3 (ref 140–450)
PMV BLD AUTO: 9.3 FL (ref 6–12)
PO2 BLDA: 81.1 MM HG (ref 83–108)
POTASSIUM SERPL-SCNC: 4.9 MMOL/L (ref 3.5–5.2)
PROT SERPL-MCNC: 6.3 G/DL (ref 6–8.5)
RBC # BLD AUTO: 3.53 10*6/MM3 (ref 3.77–5.28)
RESPIRATORY RATE: 22
SAO2 % BLDCOA: 94.3 % (ref 94–98)
SCAN SLIDE: NORMAL
SMUDGE CELLS BLD QL SMEAR: ABNORMAL
SODIUM SERPL-SCNC: 143 MMOL/L (ref 136–145)
VARIANT LYMPHS NFR BLD MANUAL: 64 % (ref 19.6–45.3)
VENTILATOR MODE: AC
VT ON VENT VENT: 450 ML
WBC NRBC COR # BLD: 46.9 10*3/MM3 (ref 3.4–10.8)

## 2023-10-21 PROCEDURE — 94664 DEMO&/EVAL PT USE INHALER: CPT

## 2023-10-21 PROCEDURE — 25010000002 METOCLOPRAMIDE PER 10 MG: Performed by: NURSE PRACTITIONER

## 2023-10-21 PROCEDURE — 85007 BL SMEAR W/DIFF WBC COUNT: CPT | Performed by: NURSE PRACTITIONER

## 2023-10-21 PROCEDURE — 80053 COMPREHEN METABOLIC PANEL: CPT | Performed by: STUDENT IN AN ORGANIZED HEALTH CARE EDUCATION/TRAINING PROGRAM

## 2023-10-21 PROCEDURE — 25010000002 PIPERACILLIN SOD-TAZOBACTAM PER 1 G: Performed by: INTERNAL MEDICINE

## 2023-10-21 PROCEDURE — 25010000002 PHENYLEPHRINE 10 MG/ML SOLUTION: Performed by: INTERNAL MEDICINE

## 2023-10-21 PROCEDURE — P9047 ALBUMIN (HUMAN), 25%, 50ML: HCPCS | Performed by: NURSE PRACTITIONER

## 2023-10-21 PROCEDURE — 85025 COMPLETE CBC W/AUTO DIFF WBC: CPT | Performed by: NURSE PRACTITIONER

## 2023-10-21 PROCEDURE — 94799 UNLISTED PULMONARY SVC/PX: CPT

## 2023-10-21 PROCEDURE — 94761 N-INVAS EAR/PLS OXIMETRY MLT: CPT

## 2023-10-21 PROCEDURE — 94003 VENT MGMT INPAT SUBQ DAY: CPT

## 2023-10-21 PROCEDURE — 63710000001 INSULIN LISPRO (HUMAN) PER 5 UNITS: Performed by: INTERNAL MEDICINE

## 2023-10-21 PROCEDURE — 25810000003 SODIUM CHLORIDE 0.9 % SOLUTION: Performed by: INTERNAL MEDICINE

## 2023-10-21 PROCEDURE — 82330 ASSAY OF CALCIUM: CPT | Performed by: STUDENT IN AN ORGANIZED HEALTH CARE EDUCATION/TRAINING PROGRAM

## 2023-10-21 PROCEDURE — 25010000002 ENOXAPARIN PER 10 MG: Performed by: INTERNAL MEDICINE

## 2023-10-21 PROCEDURE — 83735 ASSAY OF MAGNESIUM: CPT | Performed by: STUDENT IN AN ORGANIZED HEALTH CARE EDUCATION/TRAINING PROGRAM

## 2023-10-21 PROCEDURE — 25010000002 ALBUMIN HUMAN 25% PER 50 ML: Performed by: NURSE PRACTITIONER

## 2023-10-21 PROCEDURE — 94669 MECHANICAL CHEST WALL OSCILL: CPT

## 2023-10-21 PROCEDURE — 82948 REAGENT STRIP/BLOOD GLUCOSE: CPT

## 2023-10-21 PROCEDURE — 25810000003 SODIUM CHLORIDE 0.9 % SOLUTION 500 ML FLEX CONT: Performed by: INTERNAL MEDICINE

## 2023-10-21 PROCEDURE — 84100 ASSAY OF PHOSPHORUS: CPT | Performed by: STUDENT IN AN ORGANIZED HEALTH CARE EDUCATION/TRAINING PROGRAM

## 2023-10-21 PROCEDURE — 82803 BLOOD GASES ANY COMBINATION: CPT

## 2023-10-21 PROCEDURE — 25010000002 HYDROMORPHONE HCL PF 50 MG/5ML SOLUTION: Performed by: NURSE PRACTITIONER

## 2023-10-21 PROCEDURE — 63710000001 INSULIN LISPRO (HUMAN) PER 5 UNITS: Performed by: NURSE PRACTITIONER

## 2023-10-21 RX ORDER — INSULIN LISPRO 100 [IU]/ML
4-24 INJECTION, SOLUTION INTRAVENOUS; SUBCUTANEOUS EVERY 6 HOURS SCHEDULED
Status: DISCONTINUED | OUTPATIENT
Start: 2023-10-21 | End: 2023-11-06 | Stop reason: HOSPADM

## 2023-10-21 RX ORDER — ALBUMIN (HUMAN) 12.5 G/50ML
25 SOLUTION INTRAVENOUS ONCE
Status: COMPLETED | OUTPATIENT
Start: 2023-10-21 | End: 2023-10-21

## 2023-10-21 RX ORDER — NOREPINEPHRINE BITARTRATE 0.03 MG/ML
.02-.3 INJECTION, SOLUTION INTRAVENOUS
Status: DISCONTINUED | OUTPATIENT
Start: 2023-10-21 | End: 2023-10-21

## 2023-10-21 RX ADMIN — LEVOTHYROXINE SODIUM 100 MCG: 0.1 TABLET ORAL at 05:11

## 2023-10-21 RX ADMIN — PIPERACILLIN AND TAZOBACTAM 4.5 G: 4; .5 INJECTION, POWDER, FOR SOLUTION INTRAVENOUS at 12:18

## 2023-10-21 RX ADMIN — Medication 2 TABLET: at 10:15

## 2023-10-21 RX ADMIN — Medication 10 ML: at 21:34

## 2023-10-21 RX ADMIN — KETAMINE HYDROCHLORIDE 0.3 MG/KG/HR: 100 INJECTION, SOLUTION, CONCENTRATE INTRAMUSCULAR; INTRAVENOUS at 12:07

## 2023-10-21 RX ADMIN — DOCUSATE SODIUM 50 MG AND SENNOSIDES 8.6 MG 1 TABLET: 8.6; 5 TABLET, FILM COATED ORAL at 10:15

## 2023-10-21 RX ADMIN — INSULIN LISPRO 12 UNITS: 100 INJECTION, SOLUTION INTRAVENOUS; SUBCUTANEOUS at 18:04

## 2023-10-21 RX ADMIN — SERTRALINE 150 MG: 100 TABLET, FILM COATED ORAL at 10:15

## 2023-10-21 RX ADMIN — Medication 0.04 MCG/KG/MIN: at 18:56

## 2023-10-21 RX ADMIN — BUDESONIDE 0.5 MG: 0.5 INHALANT RESPIRATORY (INHALATION) at 07:45

## 2023-10-21 RX ADMIN — INSULIN LISPRO 5 UNITS: 100 INJECTION, SOLUTION INTRAVENOUS; SUBCUTANEOUS at 12:01

## 2023-10-21 RX ADMIN — GUAIFENESIN 400 MG: 200 TABLET ORAL at 21:33

## 2023-10-21 RX ADMIN — ROSUVASTATIN 10 MG: 10 TABLET, FILM COATED ORAL at 21:33

## 2023-10-21 RX ADMIN — Medication 10 ML: at 10:16

## 2023-10-21 RX ADMIN — INSULIN LISPRO 5 UNITS: 100 INJECTION, SOLUTION INTRAVENOUS; SUBCUTANEOUS at 05:11

## 2023-10-21 RX ADMIN — GUAIFENESIN 400 MG: 200 TABLET ORAL at 12:45

## 2023-10-21 RX ADMIN — BUMETANIDE 2 MG: 0.25 INJECTION, SOLUTION INTRAMUSCULAR; INTRAVENOUS at 18:00

## 2023-10-21 RX ADMIN — PHENYLEPHRINE HYDROCHLORIDE 2 MCG/KG/MIN: 10 INJECTION INTRAVENOUS at 02:45

## 2023-10-21 RX ADMIN — ENOXAPARIN SODIUM 30 MG: 100 INJECTION SUBCUTANEOUS at 15:43

## 2023-10-21 RX ADMIN — GUAIFENESIN 400 MG: 200 TABLET ORAL at 05:13

## 2023-10-21 RX ADMIN — PIPERACILLIN AND TAZOBACTAM 4.5 G: 4; .5 INJECTION, POWDER, FOR SOLUTION INTRAVENOUS at 05:11

## 2023-10-21 RX ADMIN — ALBUMIN (HUMAN) 25 G: 0.25 INJECTION, SOLUTION INTRAVENOUS at 11:04

## 2023-10-21 RX ADMIN — METOCLOPRAMIDE 5 MG: 5 INJECTION, SOLUTION INTRAMUSCULAR; INTRAVENOUS at 05:11

## 2023-10-21 RX ADMIN — HYDROMORPHONE HYDROCHLORIDE 2.5 MG/HR: 10 INJECTION, SOLUTION INTRAMUSCULAR; INTRAVENOUS; SUBCUTANEOUS at 02:45

## 2023-10-21 RX ADMIN — LANSOPRAZOLE 30 MG: 30 TABLET, ORALLY DISINTEGRATING ORAL at 05:11

## 2023-10-21 RX ADMIN — BUDESONIDE 0.5 MG: 0.5 INHALANT RESPIRATORY (INHALATION) at 19:45

## 2023-10-21 RX ADMIN — Medication 0.02 MCG/KG/MIN: at 10:31

## 2023-10-21 RX ADMIN — POLYETHYLENE GLYCOL 3350 17 G: 17 POWDER, FOR SOLUTION ORAL at 10:15

## 2023-10-21 NOTE — PROGRESS NOTES
Infectious Diseases Progress Note      LOS: 9 days   Patient Care Team:  Asia Queen APRN as PCP - General (Nurse Practitioner)  Asia Queen APRN as Nurse Practitioner (Nurse Practitioner)  Ling Bolden MD as Consulting Physician (Hematology and Oncology)    Chief Complaint: Intubated on the ventilator    Subjective     The patient had no fever during the last 24 hours.  She remained on norepinephrine drip.  She remained intubated on the ventilator but currently on 50% FiO2.     Review of Systems:   Review of Systems   Unable to perform ROS: Intubated        Objective     Vital Signs  Temp:  [98.1 °F (36.7 °C)-99.4 °F (37.4 °C)] 99.4 °F (37.4 °C)  Heart Rate:  [74-86] 83  Resp:  [22] 22  BP: ()/(38-70) 147/63  FiO2 (%):  [50 %] 50 %    Physical Exam:  Physical Exam  Constitutional:       Comments: Intubated and sedated on the ventilator   HENT:      Head: Normocephalic and atraumatic.   Eyes:      Pupils: Pupils are equal, round, and reactive to light.   Cardiovascular:      Rate and Rhythm: Normal rate and regular rhythm.   Pulmonary:      Breath sounds: Rales present.   Abdominal:      General: Abdomen is flat. Bowel sounds are normal.      Palpations: Abdomen is soft.      Comments: NG tube   Genitourinary:     Comments: Bellamy catheter  Musculoskeletal:      Cervical back: Neck supple.      Right lower leg: No edema.      Left lower leg: No edema.   Neurological:      Comments: Sedated          Results Review:    I have reviewed all clinical data, test, lab, and imaging results.     Radiology  No Radiology Exams Resulted Within Past 24 Hours    Cardiology    Laboratory    Results from last 7 days   Lab Units 10/21/23  0455 10/20/23  0449 10/19/23  0413 10/18/23  0453 10/17/23  0424 10/16/23  0429 10/15/23  0616   WBC 10*3/mm3 46.90* 41.20* 34.20* 30.20* 14.40* 17.40* 16.10*   HEMOGLOBIN g/dL 9.6* 9.7* 9.4* 8.9* 10.3* 10.3* 9.9*   HEMATOCRIT % 31.8* 31.3* 30.4* 28.3* 33.8* 33.3* 32.8*    PLATELETS 10*3/mm3 269 220 195 166 122* 131* 131*     Results from last 7 days   Lab Units 10/21/23  0455 10/20/23  0449 10/19/23  1204 10/19/23  0413 10/18/23  0453 10/17/23  0424 10/16/23  0429 10/15/23  0616   SODIUM mmol/L 143 141  --  140 138 143 143 142   POTASSIUM mmol/L 4.9 3.7 3.8 3.1* 3.6 3.7 4.2 5.0   CHLORIDE mmol/L 105 103  --  102 99 103 106 107   CO2 mmol/L 24.0 24.0  --  23.0 24.0 24.0 23.0 25.0   BUN mg/dL 66* 57*  --  62* 61* 65* 60* 52*   CREATININE mg/dL 2.32* 2.14*  --  1.96* 2.08* 1.54* 1.48* 1.43*   GLUCOSE mg/dL 232* 253*  --  127* 147* 183* 131* 136*   ALBUMIN g/dL 3.0* 2.9*  --  2.8* 3.0* 3.3*  --   --    BILIRUBIN mg/dL 0.2 0.2  --  0.2 0.2 0.3  --   --    ALK PHOS U/L 182* 203*  --  218* 97 110  --   --    AST (SGOT) U/L 21 44*  --  106* 23 11  --   --    ALT (SGPT) U/L 30 49*  --  63* 11 9  --   --    CALCIUM mg/dL 9.0 8.8  --  8.6 8.6 9.3 9.4 9.3                 Microbiology   Microbiology Results (last 10 days)       Procedure Component Value - Date/Time    MRSA Screen, PCR (Inpatient) - Swab, Nares [[id number]]  (Normal) Collected: 10/18/23 1546    Lab Status: Final result Specimen: Swab from Nares Updated: 10/18/23 1727     MRSA PCR No MRSA Detected    Narrative:      The negative predictive value of this diagnostic test is high and should only be used to consider de-escalating anti-MRSA therapy. A positive result may indicate colonization with MRSA and must be correlated clinically.    Blood Culture - Blood, Hand, Right [[id number]]  (Normal) Collected: 10/18/23 1440    Lab Status: Preliminary result Specimen: Blood from Hand, Right Updated: 10/21/23 1501     Blood Culture No growth at 3 days    Narrative:      Less than seven (7) mL's of blood was collected.  Insufficient quantity may yield false negative results.    AFB Culture - Wash, Bronchus [[id number]] Collected: 10/18/23 1440    Lab Status: Preliminary result Specimen: Wash from Bronchus Updated: 10/19/23 1047     AFB Stain  No acid fast bacilli seen on concentrated smear    Respiratory Culture - Wash, Bronchus [[id number]] Collected: 10/18/23 1440    Lab Status: Final result Specimen: Wash from Bronchus Updated: 10/20/23 0910     Respiratory Culture Light growth (2+) Normal respiratory franci. No S. aureus or Pseudomonas aeruginosa detected. Final report.     Gram Stain Many (4+) WBCs seen      Rare (1+) Gram positive cocci in clusters    Blood Culture - Blood, Hand, Right [[id number]]  (Normal) Collected: 10/18/23 1437    Lab Status: Preliminary result Specimen: Blood from Hand, Right Updated: 10/21/23 1501     Blood Culture No growth at 3 days    Narrative:      Less than seven (7) mL's of blood was collected.  Insufficient quantity may yield false negative results.    Respiratory Culture - Sputum, ET Suction [[id number]] Collected: 10/15/23 1354    Lab Status: Final result Specimen: Sputum from ET Suction Updated: 10/18/23 1030     Respiratory Culture Scant growth (1+) Normal respiratory franci. No S. aureus or Pseudomonas aeruginosa detected. Final report.     Gram Stain Moderate (3+) WBCs per low power field      Few (2+) Gram positive cocci in clusters    Blood Culture - Blood, Hand, Left [[id number]]  (Normal) Collected: 10/14/23 1620    Lab Status: Final result Specimen: Blood from Hand, Left Updated: 10/19/23 1701     Blood Culture No growth at 5 days    Urine Culture - Urine, Straight Cath [[id number]]  (Abnormal)  (Susceptibility) Collected: 10/14/23 1342    Lab Status: Final result Specimen: Urine from Straight Cath Updated: 10/17/23 0224     Urine Culture >100,000 CFU/mL Klebsiella pneumoniae ssp pneumoniae      >100,000 CFU/mL Escherichia coli    Narrative:      Colonization of the urinary tract without infection is common. Treatment is discouraged unless the patient is symptomatic, pregnant, or undergoing an invasive urologic procedure.    Susceptibility        Klebsiella pneumoniae ssp pneumoniae      JUAN      Ampicillin  Resistant      Ampicillin + Sulbactam Susceptible      Cefazolin Susceptible      Cefepime Susceptible      Ceftazidime Susceptible      Ceftriaxone Susceptible      Gentamicin Susceptible      Levofloxacin Susceptible      Nitrofurantoin Intermediate      Piperacillin + Tazobactam Susceptible      Trimethoprim + Sulfamethoxazole Susceptible                       Susceptibility        Escherichia coli      JUAN      Ampicillin Susceptible      Ampicillin + Sulbactam Susceptible      Cefazolin Susceptible      Cefepime Susceptible      Ceftazidime Susceptible      Ceftriaxone Susceptible      Gentamicin Susceptible      Levofloxacin Susceptible      Nitrofurantoin Susceptible      Piperacillin + Tazobactam Susceptible      Trimethoprim + Sulfamethoxazole Susceptible                                   Medication Review:       Schedule Meds  budesonide, 0.5 mg, Nebulization, Q12H  bumetanide, 2 mg, Intravenous, Q12H  calcium 500 mg vitamin D 5 mcg (200 UT), 2 tablet, Nasogastric, Daily  enoxaparin, 30 mg, Subcutaneous, Daily  ferrous sulfate, 300 mg, Nasogastric, Once per day on Mon Wed Fri  guaiFENesin, 400 mg, Nasogastric, Q8H  insulin lispro, 4-24 Units, Subcutaneous, Q6H  lactulose, 20 g, Nasogastric, Once  lansoprazole, 30 mg, Nasogastric, Q AM  levothyroxine, 100 mcg, Nasogastric, Q AM  [START ON 10/22/2023] piperacillin-tazobactam, 4.5 g, Intravenous, Q12H  senna-docusate sodium, 1 tablet, Nasogastric, BID   And  polyethylene glycol, 17 g, Nasogastric, BID  rosuvastatin, 10 mg, Nasogastric, Nightly  sertraline, 150 mg, Nasogastric, Daily  sodium chloride, 10 mL, Intravenous, Q12H  sodium chloride, 10 mL, Intravenous, Q12H        Infusion Meds  HYDROmorphone 1 mg/ml, 0.2-3 mg/hr, Last Rate: Stopped (10/21/23 1819)  ketamine (KETALAR) 500 mg/500 mL ns infusion, 0.06-2.5 mg/kg/hr, Last Rate: 0.3 mg/kg/hr (10/21/23 1207)  midazolam, 1-10 mg/hr, Last Rate: 1 mg/hr (10/21/23 1045)  norepinephrine, 0.05-0.5  mcg/kg/min  Pharmacy Consult - Pharmacy to dose,   phenylephrine, 0.5-6 mcg/kg/min, Last Rate: Stopped (10/21/23 1030)        PRN Meds    acetaminophen    senna-docusate sodium **AND** polyethylene glycol **AND** bisacodyl **AND** bisacodyl    cyclobenzaprine    dextrose    dextrose    dextrose    fentaNYL citrate (PF)    glucagon (human recombinant)    hydrALAZINE    ipratropium-albuterol    lidocaine    midazolam    nitroglycerin    ondansetron    oxyCODONE    Pharmacy Consult - Pharmacy to dose    [COMPLETED] Insert Peripheral IV **AND** sodium chloride    sodium chloride    sodium chloride    sodium chloride    sodium chloride        Assessment & Plan       Antimicrobial Therapy   1.  IV Zosyn        2.        3.        4.        5.            Assessment     Acute respiratory failure.  Most likely secondary to hospital-acquired pneumonia.  CT scan of the chest on October 18 showed bilateral lower lobe infiltrates.  There was no additional findings consistent with PCP pneumonia  The patient is currently on ventilator on 50% FiO2 and 8 PEEP  Patient is s/p bronchoscopy and BAL cultures are negative  MRSA screen was negative     Severe lymphocytosis.  Most likely secondary to underlying CLL.  Patient was not on treatment prior to admission     History of breast cancer s/p mastectomies in the past     UTI with Klebsiella pneumoniae and E. coli.  Organisms were relatively susceptible     Diabetes mellitus type 2     Rhinovirus infection.  Nasal swab was positive for rhinovirus PCR    Mild septic shock-currently on norepinephrine drip.      Plan     Continue Zosyn 3.375 g every 8 hours for a total of 7 days  Continue supportive care  A.m. labs  Droplet isolation for rhinovirus infection for total of 7 days.  Can come off isolation on October 22  The case was discussed with the patient's daughter at bedside        SIOMARA Sanchez  10/21/23  18:42 EDT    Note is dictated utilizing voice recognition  software/Charles

## 2023-10-21 NOTE — PROGRESS NOTES
Critical Care Progress Note   Diane Guan : 1940 MRN:[id number] LOS:9     Principal Problem: Acute hypoxemic respiratory failure     Reason for follow up: All the medical problems listed below    Summary     The following information has been supplemented by review of documentation, collaboration with other providers, and interview with the patient's daughter at bedside as the patient is confused at the time of examination.  The patient presented to the emergency department on 10/10/2023 for evaluation of right chest wall pain following a fall at home.  The patient's daughter reports that the patient lost her balance and fell when she got up in the dark to go to the restroom.  She apparently had preached for the light switch at which time stumbled and fell with her right side hitting the elevated toilet seat and then fell to her right side striking the floor.  There was no report of LOC and following the fall the patient was not confused.  The patient's daughter also reports that the patient had complained of a cough productive of clear secretions approximately 3 weeks ago.  On  the patient was evaluated at an urgent care facility where she was prescribed a Z-Erick however had no improvement.     On evaluation in the emergency department the patient was found to have hypoxic/hypercapnic respiratory failure which responded well to O2 therapy.  Her home dose diuretics were continued.  She was admitted for further evaluation and treatment to the hospitalist.  The patient's daughter states that at approximately 5 PM yesterday evening the patient developed worsening confusion and hallucinations.  Overnight the patient developed worsening O2 needs and hypercapnia requiring increased supplementation to BiPAP therapy.    ACP: No ACP documentation on file.  Patient is currently confused however her adult daughter is at bedside and the patient indicates that decisions can be made by her.     Significant events      10/21/23 : No acute events overnight.  Intubated and sedated--- Versed, ketamine, Dilaudid.  Ventilator support requirements remained stable with FiO2 50% and PEEP +8.  Remains on pressor support with Levophed.  Tolerating tube feeds at goal.  Multiple BMs this morning.  Keep Bellamy catheter due to urinary retention    Assessment / Plan     Acute respiratory failure with hypoxia and hypercapnia  Multifactorial with AE COPD, volume overload with small left pleural effusion  Likely due to fluid volume overload and mild/acute exacerbation COPD  Failed BiPAP and Airvo, required intubation on 10/15.  Initially required high PEEP and high FiO2 to maintain oxygenation.  Weaned down to FiO2 50% and PEEP +8.  Requiring heavy sedation with Versed, Dilaudid, and ketamine.  Not yet ready to start weaning sedation.  Initially planned on a paralytic initiation and possible prone positioning, but paralytic was unable to be initiated due to difficulty with obtaining an appropriate sedation level with BIS monitoring.  O2 needs have improved without using paralytics.  Discussed with nephrology, continuing with scheduled diuresis.  If patient's renal function does not tolerate diuresis, may require hemodialysis, but so far remaining stable.  Scheduled Pulmicort, as needed DuoNebs    Septic shock  Etiology: Acute cystitis w/o hematuria--E coli & Klebsiella pneumoniae  Rhinovirus infection  Urine culture with Klebsiella pneumonia and E. Coli  RVP: +Rhinovirus  All other microbiology cultures are negative or pending.  ID managing antimicrobials, Zosyn  Echocardiogram 1/6/2021, EF 65%; Repeat echocardiogram 10/20/2023 EF 61 to 65%  Back on vasopressors, titrate for a mean of 65 mmHg    Transaminitis with dilated common bile duct  CTs were reviewed, bile duct dilatation noted.  Gallbladder ultrasound again confirmed dilated common bile duct without evidence of stone, measuring 14 mm.  Initially an MRI was planned, but patient is unable  to transport due to drips and ventilation.  Gastroenterology following.  No intervention indicated for CBD and patient is not a candidate for MRCP or ERCP given her current overall condition.  Intervention could be considered if ID feels that the source of infection is intra-abdominal  LFTs improving.  Continue to monitor daily.    Diabetes melitis type II  Hold home dose metformin due to LEXUS  Accuchecks with SSI.  Hemoglobin A1c 6.50     Acute metabolic encephalopathy/Delirium  Likely metabolic secondary to hypercapnia and hypoxia  Patient has not returned to their baseline at this time.    CT head negative for acute intracranial abnormalities     Essential hypertension  Antihypertensives on hold due to current hypotension  Titrate meds as needed     Acute kidney injury on CKD stage III, baseline creatinine 1.3  Acute urinary retention  Hyperkalemia  Remains non oliguric.   Likely prerenal. Possible intrinsic component due to ATN from infection, drugs and hypotension.   Nephrology following  Renal ultrasound from 10/16 was unremarkable  Monitor Input/Output very closely.   Continue Bellamy catheter for now to relieve urinary retention and close I/O monitoring  Started on Bumex gtt on 10/18 which has been discontinued.  Now on scheduled Bumex dosing     Diabetes mellitus type 2  Hold home medications due to LEXUS  SSI for tight glycemic control  Hemoglobin A1c 6.5%     Constipation with abdominal discomfort  KUB negative for obstructive pattern, moderate stool burden  Scheduled bowel regimen  Multiple BMs this morning 10/21  Discontinue Reglan.    Hypothyroidism--continue levothyroxine.    Hyperlipidemia--statin therapy held due to elevated LFTs.  Breast cancer, s/p right mastectomy.  Anastrozole since 2019--discontinued 10/15 when the patient was intubated as a cannot safely be crushed and instilled in feeding tube  CLL, chronic leukocytosis  Falls at home/functional decline      Code status:   Level Of Support  Discussed With: Patient  Code Status (Patient has no pulse and is not breathing): CPR (Attempt to Resuscitate)  Medical Interventions (Patient has pulse or is breathing): Full Support       Nutrition: NPO Diet NPO Type: Strict NPO   Diet, Tube Feeding Tube Feeding Formula: Peptamen AF (Vital AF 1.2); Tube Feeding Type: Continuous; Continuous Tube Feeding Start Rate (mL/hr): 10; Then Advance Rate By (mL/hr): Do Not Advance; Every __ Hours: Patient at Goal Rate; To Goal Rate of...    DVT prophylaxis:  Medical and mechanical DVT prophylaxis orders are present.     Subjective / Review of systems     Review of Systems   Unable to be obtained 2/2 pt's current condition, intubated  Objective / Physical Exam   Vital signs:  Temp: 98.1 °F (36.7 °C)  BP: 153/64  Heart Rate: 82  Resp: 22  SpO2: 91 %  Weight: 83.9 kg (185 lb)    Admission Weight: Weight: 78.5 kg (173 lb)  Current Weight: Weight: 83.9 kg (185 lb)    Input/Output in last 24 hours:    Intake/Output Summary (Last 24 hours) at 10/21/2023 1214  Last data filed at 10/21/2023 0944  Gross per 24 hour   Intake 4709 ml   Output 5300 ml   Net -591 ml      Physical Exam     GEN:  Elderly woman, intubated, sedated, on vent.  NEURO: Intubated and sedated.  Generalized movement of all extremities however not following commands  HEENT:  N/AT.  PERRL.  Oral ET tube secured.  OG tube.  No drainage from the eyes/ears/nose.  No conjunctival petechiae.   NECK:  Supple, no JVD, trachea midline.    CHEST/LUNGS:  Breath sounds are diminished but clear.  Chest excursion equal bilaterally.    CARDIOVASCULAR: Sinus rhythm with MARCELLE.  No rub or gallop  GI:  Abdomen soft, nondistended, active bowel sounds  : Deferred  EXTREMITIES:  No deformity or amputation.  No cyanosis or asymmetry.  BLE edema.    SKIN:  Warm, dry, and pink.  No rash  PSYCH:  Unable to be obtained 2/2 pt's current condition.        Radiology and Labs     Results from last 7 days   Lab Units 10/21/23  7934 10/20/23  0931  10/19/23  0413 10/18/23  0453 10/17/23  0424   WBC 10*3/mm3 46.90* 41.20* 34.20* 30.20* 14.40*   HEMATOCRIT % 31.8* 31.3* 30.4* 28.3* 33.8*   PLATELETS 10*3/mm3 269 220 195 166 122*      Results from last 7 days   Lab Units 10/21/23  0455 10/20/23  0449 10/19/23  1204 10/19/23  0413 10/18/23  0453 10/17/23  0424   SODIUM mmol/L 143 141  --  140 138 143   POTASSIUM mmol/L 4.9 3.7 3.8 3.1* 3.6 3.7   CHLORIDE mmol/L 105 103  --  102 99 103   CO2 mmol/L 24.0 24.0  --  23.0 24.0 24.0   BUN mg/dL 66* 57*  --  62* 61* 65*   CREATININE mg/dL 2.32* 2.14*  --  1.96* 2.08* 1.54*      Current medications   Scheduled Meds: budesonide, 0.5 mg, Nebulization, Q12H  bumetanide, 2 mg, Intravenous, Q12H  calcium 500 mg vitamin D 5 mcg (200 UT), 2 tablet, Nasogastric, Daily  enoxaparin, 30 mg, Subcutaneous, Daily  ferrous sulfate, 300 mg, Nasogastric, Once per day on Mon Wed Fri  guaiFENesin, 400 mg, Nasogastric, Q8H  insulin lispro, 3-14 Units, Subcutaneous, Q6H  lactulose, 20 g, Nasogastric, Once  lansoprazole, 30 mg, Nasogastric, Q AM  levothyroxine, 100 mcg, Nasogastric, Q AM  piperacillin-tazobactam, 4.5 g, Intravenous, Q8H  senna-docusate sodium, 1 tablet, Nasogastric, BID   And  polyethylene glycol, 17 g, Nasogastric, BID  rosuvastatin, 10 mg, Nasogastric, Nightly  sertraline, 150 mg, Nasogastric, Daily  sodium chloride, 10 mL, Intravenous, Q12H  sodium chloride, 10 mL, Intravenous, Q12H      Continuous Infusions: HYDROmorphone 1 mg/ml, 0.2-3 mg/hr, Last Rate: 1 mg/hr (10/21/23 1025)  ketamine (KETALAR) 500 mg/500 mL ns infusion, 0.06-2.5 mg/kg/hr, Last Rate: 0.3 mg/kg/hr (10/21/23 1207)  midazolam, 1-10 mg/hr, Last Rate: 3 mg/hr (10/21/23 1025)  norepinephrine, 0.02-0.3 mcg/kg/min, Last Rate: 0.02 mcg/kg/min (10/21/23 1033)  Pharmacy Consult - Pharmacy to dose,   phenylephrine, 0.5-6 mcg/kg/min, Last Rate: 2 mcg/kg/min (10/21/23 1000)      Patient continues to be critically ill, remains at risk of clinical deterioration or  death and needed high complexity decision making.  Critical care services to this patient including but not limited to: review of labs/ microbiology/imaging/medications, serial monitoring of vital signs, adjusting ventilator settings as needed, review of other consultant's notes, review of events in the last 24 hrs, monitoring input/output, review of treatment plan with bedside nurse, RT and other treatment team, management of life support and nutrition needs. I also spoke with patient's family member about the plan of care and answered all questions.    Time spent in performing separately billable procedures and updating family is not included in the critical care time.       Anita Cox, APRN   Critical Care  10/21/23   12:14 EDT

## 2023-10-21 NOTE — PROGRESS NOTES
"RENAL/KCC:     LOS: 9 days    Patient Care Team:  Asia Queen APRN as PCP - General (Nurse Practitioner)  Asia Queen APRN as Nurse Practitioner (Nurse Practitioner)  Ling Bolden MD as Consulting Physician (Hematology and Oncology)    Chief Complaint:  Acute resp failure    Subjective     Interval History:   Chart reviewed  Remains on the vent -remains on 50% FiO2  Remains on Shahbaz  Discussed with nursing staff and patient's family at bedside  Excellent urine output but only keeping even due to high volume of IV infusions      Objective     Vital Sign Min/Max for last 24 hours  Temp  Min: 98.1 °F (36.7 °C)  Max: 99.4 °F (37.4 °C)   BP  Min: 71/38  Max: 163/61   Pulse  Min: 74  Max: 86   Resp  Min: 22  Max: 22   SpO2  Min: 86 %  Max: 95 %   No data recorded   No data recorded     Flowsheet Rows      Flowsheet Row First Filed Value   Admission Height 162.6 cm (64\") Documented at 10/10/2023 1252   Admission Weight 78.5 kg (173 lb) Documented at 10/10/2023 1252            I/O this shift:  In: 0   Out: 1400 [Urine:1400]  I/O last 3 completed shifts:  In: 7596 [I.V.:5030; Other:429; NG/GT:2137]  Out: 7000 [Urine:7000]    Physical Exam:  GEN: Intubated, sedated, on the ventilator  ENT: +ETT  NECK: Supple, no JVD  CHEST: Lungs are clear  CV: RRR, no M/G/R, no peripheral edema  ABD: Soft, NT, +BS  SKIN: Warm and Dry  NEURO: Sedated      WBC WBC   Date Value Ref Range Status   10/21/2023 46.90 (C) 3.40 - 10.80 10*3/mm3 Final   10/20/2023 41.20 (C) 3.40 - 10.80 10*3/mm3 Final   10/19/2023 34.20 (C) 3.40 - 10.80 10*3/mm3 Final      HGB Hemoglobin   Date Value Ref Range Status   10/21/2023 9.6 (L) 12.0 - 15.9 g/dL Final   10/20/2023 9.7 (L) 12.0 - 15.9 g/dL Final   10/19/2023 9.4 (L) 12.0 - 15.9 g/dL Final      HCT Hematocrit   Date Value Ref Range Status   10/21/2023 31.8 (L) 34.0 - 46.6 % Final   10/20/2023 31.3 (L) 34.0 - 46.6 % Final   10/19/2023 30.4 (L) 34.0 - 46.6 % Final      Platlets No results " "found for: \"LABPLAT\"   MCV MCV   Date Value Ref Range Status   10/21/2023 90.1 79.0 - 97.0 fL Final   10/20/2023 89.9 79.0 - 97.0 fL Final   10/19/2023 87.6 79.0 - 97.0 fL Final          Sodium Sodium   Date Value Ref Range Status   10/21/2023 143 136 - 145 mmol/L Final   10/20/2023 141 136 - 145 mmol/L Final   10/19/2023 140 136 - 145 mmol/L Final      Potassium Potassium   Date Value Ref Range Status   10/21/2023 4.9 3.5 - 5.2 mmol/L Final   10/20/2023 3.7 3.5 - 5.2 mmol/L Final   10/19/2023 3.8 3.5 - 5.2 mmol/L Final   10/19/2023 3.1 (L) 3.5 - 5.2 mmol/L Final      Chloride Chloride   Date Value Ref Range Status   10/21/2023 105 98 - 107 mmol/L Final   10/20/2023 103 98 - 107 mmol/L Final   10/19/2023 102 98 - 107 mmol/L Final      CO2 CO2   Date Value Ref Range Status   10/21/2023 24.0 22.0 - 29.0 mmol/L Final   10/20/2023 24.0 22.0 - 29.0 mmol/L Final   10/19/2023 23.0 22.0 - 29.0 mmol/L Final      BUN BUN   Date Value Ref Range Status   10/21/2023 66 (H) 8 - 23 mg/dL Final   10/20/2023 57 (H) 8 - 23 mg/dL Final   10/19/2023 62 (H) 8 - 23 mg/dL Final      Creatinine Creatinine   Date Value Ref Range Status   10/21/2023 2.32 (H) 0.57 - 1.00 mg/dL Final   10/20/2023 2.14 (H) 0.57 - 1.00 mg/dL Final   10/19/2023 1.96 (H) 0.57 - 1.00 mg/dL Final      Calcium Calcium   Date Value Ref Range Status   10/21/2023 9.0 8.6 - 10.5 mg/dL Final   10/20/2023 8.8 8.6 - 10.5 mg/dL Final   10/19/2023 8.6 8.6 - 10.5 mg/dL Final      PO4 No results found for: \"CAPO4\"   Albumin Albumin   Date Value Ref Range Status   10/21/2023 3.0 (L) 3.5 - 5.2 g/dL Final   10/20/2023 2.9 (L) 3.5 - 5.2 g/dL Final   10/19/2023 2.8 (L) 3.5 - 5.2 g/dL Final      Magnesium Magnesium   Date Value Ref Range Status   10/21/2023 1.8 1.6 - 2.4 mg/dL Final   10/20/2023 1.8 1.6 - 2.4 mg/dL Final   10/19/2023 1.9 1.6 - 2.4 mg/dL Final      Uric Acid No results found for: \"URICACID\"        Results Review:     I reviewed the patient's new clinical " results.    budesonide, 0.5 mg, Nebulization, Q12H  bumetanide, 2 mg, Intravenous, Q12H  calcium 500 mg vitamin D 5 mcg (200 UT), 2 tablet, Nasogastric, Daily  enoxaparin, 30 mg, Subcutaneous, Daily  ferrous sulfate, 300 mg, Nasogastric, Once per day on Mon Wed Fri  guaiFENesin, 400 mg, Nasogastric, Q8H  insulin lispro, 4-24 Units, Subcutaneous, Q6H  lactulose, 20 g, Nasogastric, Once  lansoprazole, 30 mg, Nasogastric, Q AM  levothyroxine, 100 mcg, Nasogastric, Q AM  [START ON 10/22/2023] piperacillin-tazobactam, 4.5 g, Intravenous, Q12H  senna-docusate sodium, 1 tablet, Nasogastric, BID   And  polyethylene glycol, 17 g, Nasogastric, BID  rosuvastatin, 10 mg, Nasogastric, Nightly  sertraline, 150 mg, Nasogastric, Daily  sodium chloride, 10 mL, Intravenous, Q12H  sodium chloride, 10 mL, Intravenous, Q12H      HYDROmorphone 1 mg/ml, 0.2-3 mg/hr, Last Rate: 1 mg/hr (10/21/23 1025)  ketamine (KETALAR) 500 mg/500 mL ns infusion, 0.06-2.5 mg/kg/hr, Last Rate: 0.3 mg/kg/hr (10/21/23 1207)  midazolam, 1-10 mg/hr, Last Rate: 1 mg/hr (10/21/23 1045)  norepinephrine, 0.05-0.5 mcg/kg/min  Pharmacy Consult - Pharmacy to dose,   phenylephrine, 0.5-6 mcg/kg/min, Last Rate: Stopped (10/21/23 1030)        Medication Review: Reviewed    Assessment & Plan     LEXUS/CKD3b, slightly worse today  Sepsis with endorgan dysfunction  Fluid overload, improved with diuresis  Hypokalemia -largely resolved  Hypocalcemia, approaching normal levels  Severe leukocytosis  Pulmonary edema  Hypotension  Anemia related to renal failure      Acute hypoxemic respiratory failure    Mixed anxiety and depressive disorder    Primary osteoarthritis involving multiple joints    Hiatal hernia with gastroesophageal reflux    Mixed hyperlipidemia    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    Acquired hypothyroidism    Type 2 diabetes mellitus with stage 3b chronic kidney disease, without long-term current use of insulin    Insomnia    Overactive  bladder    Vitamin B 12 deficiency    Vitamin D deficiency    LIBBY (obstructive sleep apnea)    COPD (chronic obstructive pulmonary disease)    CKD (chronic kidney disease) stage 3, GFR 30-59 ml/min    History of breast cancer    Personal history of CLL (chronic lymphocytic leukemia)    History of cigarette smoking    Fall at home    Rhinovirus infection    Acute respiratory failure    Hyperkalemia    Respiratory failure    Pleural effusion    Plan:   Volume status seems to be improved and renal function is a bit worse again today  Discontinue Bumex drip  Start scheduled IV Bumex  No current indication for CRRT  We will ask pharmacy to maximally concentrate all IV meds and drips  Renally dose IV antibiotics for GFR less than 30  Continue oral calcium and vitamin D  Discussed with family at bedside        Kyle Baugh MD  [facility]  10/21/23  17:29 EDT

## 2023-10-21 NOTE — PROGRESS NOTES
Nutrition Services  Patient Name: Diane Guan  YOB: 1940  MRN: [id number]  Admission date: 10/10/2023    PPE Documentation        PPE Worn By Provider Did not enter room for this encounter   PPE Worn By Patient  N/A     PROGRESS NOTE      Encounter Information: Progress note to check on TF. Peptamen AF is infusing at 60mL/hour, with residuals ranging around 100mL. Remains on vent support with fentanyl for sedation.       PO Diet: NPO Diet NPO Type: Strict NPO   PO Supplements: None ordered    PO Intake:  NPO       Current nutrition support: Peptamen AF at 60 mL/hour + 10 mL/hour water flush   If prone: Peptamen AF at 10 mL/hour + 10 mL/hour water flush    Nutrition support review: Documented at 60 mL/hour per EMR today 10/21       Labs (reviewed below): Elevated BUN/Cr- ongoing  Mildly elevated phosphorus - new; may need to alter TF if this persists  Hyperglycemia - ongoing       GI Function:  Last documented BM 10/21  Residuals WNL       Nutrition Intervention Updates: While supine: Peptaman AF at 60 mL/hour + 10 mL/hour water flush     If it is necessary to put pt into prone position, then provide  Peptamen AF at 10 mL/hour + 10 mL/hour water flush     Results from last 7 days   Lab Units 10/21/23  0455 10/20/23  0449 10/19/23  1204 10/19/23  0413   SODIUM mmol/L 143 141  --  140   POTASSIUM mmol/L 4.9 3.7 3.8 3.1*   CHLORIDE mmol/L 105 103  --  102   CO2 mmol/L 24.0 24.0  --  23.0   BUN mg/dL 66* 57*  --  62*   CREATININE mg/dL 2.32* 2.14*  --  1.96*   CALCIUM mg/dL 9.0 8.8  --  8.6   BILIRUBIN mg/dL 0.2 0.2  --  0.2   ALK PHOS U/L 182* 203*  --  218*   ALT (SGPT) U/L 30 49*  --  63*   AST (SGOT) U/L 21 44*  --  106*   GLUCOSE mg/dL 232* 253*  --  127*     Results from last 7 days   Lab Units 10/21/23  0455 10/20/23  0449 10/19/23  0413   MAGNESIUM mg/dL 1.8 1.8 1.9   PHOSPHORUS mg/dL 4.6* 2.9 3.6   HEMOGLOBIN g/dL 9.6* 9.7* 9.4*   HEMATOCRIT % 31.8* 31.3* 30.4*     COVID19   Date Value Ref  Range Status   10/10/2023 Not Detected Not Detected - Ref. Range Final     Lab Results   Component Value Date    HGBA1C 6.50 (H) 10/13/2023     RD to follow up per protocol.    Electronically signed by:  Kita Chino RD  10/21/23 18:09 EDT

## 2023-10-22 ENCOUNTER — APPOINTMENT (OUTPATIENT)
Dept: GENERAL RADIOLOGY | Facility: HOSPITAL | Age: 83
DRG: 207 | End: 2023-10-22
Payer: MEDICARE

## 2023-10-22 LAB
ALBUMIN SERPL-MCNC: 3.3 G/DL (ref 3.5–5.2)
ALBUMIN/GLOB SERPL: 0.9 G/DL
ALP SERPL-CCNC: 155 U/L (ref 39–117)
ALT SERPL W P-5'-P-CCNC: 24 U/L (ref 1–33)
ANION GAP SERPL CALCULATED.3IONS-SCNC: 14 MMOL/L (ref 5–15)
ANISOCYTOSIS BLD QL: ABNORMAL
ARTERIAL PATENCY WRIST A: ABNORMAL
AST SERPL-CCNC: 19 U/L (ref 1–32)
ATMOSPHERIC PRESS: ABNORMAL MM[HG]
BASE EXCESS BLDA CALC-SCNC: 1.5 MMOL/L (ref 0–3)
BDY SITE: ABNORMAL
BILIRUB SERPL-MCNC: 0.2 MG/DL (ref 0–1.2)
BUN SERPL-MCNC: 73 MG/DL (ref 8–23)
BUN/CREAT SERPL: 33.5 (ref 7–25)
CA-I SERPL ISE-MCNC: 1.2 MMOL/L (ref 1.2–1.3)
CALCIUM SPEC-SCNC: 9.5 MG/DL (ref 8.6–10.5)
CHLORIDE SERPL-SCNC: 107 MMOL/L (ref 98–107)
CO2 BLDA-SCNC: 27.6 MMOL/L (ref 22–29)
CO2 SERPL-SCNC: 26 MMOL/L (ref 22–29)
CREAT SERPL-MCNC: 2.18 MG/DL (ref 0.57–1)
DACRYOCYTES BLD QL SMEAR: ABNORMAL
DEPRECATED RDW RBC AUTO: 47.3 FL (ref 37–54)
EGFRCR SERPLBLD CKD-EPI 2021: 22.1 ML/MIN/1.73
ERYTHROCYTE [DISTWIDTH] IN BLOOD BY AUTOMATED COUNT: 15.4 % (ref 12.3–15.4)
GLOBULIN UR ELPH-MCNC: 3.5 GM/DL
GLUCOSE BLDC GLUCOMTR-MCNC: 258 MG/DL (ref 70–105)
GLUCOSE BLDC GLUCOMTR-MCNC: 278 MG/DL (ref 70–105)
GLUCOSE BLDC GLUCOMTR-MCNC: 278 MG/DL (ref 70–105)
GLUCOSE BLDC GLUCOMTR-MCNC: 299 MG/DL (ref 70–105)
GLUCOSE SERPL-MCNC: 257 MG/DL (ref 65–99)
HCO3 BLDA-SCNC: 26.3 MMOL/L (ref 21–28)
HCT VFR BLD AUTO: 27.9 % (ref 34–46.6)
HEMODILUTION: NO
HGB BLD-MCNC: 8.6 G/DL (ref 12–15.9)
INHALED O2 CONCENTRATION: 50 %
INSPIRATORY TIME: 1
LARGE PLATELETS: ABNORMAL
LYMPHOCYTES # BLD MANUAL: 11.41 10*3/MM3 (ref 0.7–3.1)
LYMPHOCYTES NFR BLD MANUAL: 3 % (ref 5–12)
MAGNESIUM SERPL-MCNC: 1.8 MG/DL (ref 1.6–2.4)
MCH RBC QN AUTO: 26.9 PG (ref 26.6–33)
MCHC RBC AUTO-ENTMCNC: 30.7 G/DL (ref 31.5–35.7)
MCV RBC AUTO: 87.7 FL (ref 79–97)
METAMYELOCYTES NFR BLD MANUAL: 1 % (ref 0–0)
MICROCYTES BLD QL: ABNORMAL
MODALITY: ABNORMAL
MONOCYTES # BLD: 0.55 10*3/MM3 (ref 0.1–0.9)
NEUTROPHILS # BLD AUTO: 6.26 10*3/MM3 (ref 1.7–7)
NEUTROPHILS NFR BLD MANUAL: 33 % (ref 42.7–76)
NEUTS BAND NFR BLD MANUAL: 1 % (ref 0–5)
PCO2 BLDA: 41.6 MM HG (ref 35–48)
PEEP RESPIRATORY: 8 CM[H2O]
PH BLDA: 7.41 PH UNITS (ref 7.35–7.45)
PHOSPHATE SERPL-MCNC: 3.2 MG/DL (ref 2.5–4.5)
PLATELET # BLD AUTO: 200 10*3/MM3 (ref 140–450)
PMV BLD AUTO: 9.3 FL (ref 6–12)
PO2 BLDA: 80.1 MM HG (ref 83–108)
POIKILOCYTOSIS BLD QL SMEAR: ABNORMAL
POTASSIUM SERPL-SCNC: 4.6 MMOL/L (ref 3.5–5.2)
PROT SERPL-MCNC: 6.8 G/DL (ref 6–8.5)
RBC # BLD AUTO: 3.18 10*6/MM3 (ref 3.77–5.28)
RESPIRATORY RATE: 22
SAO2 % BLDCOA: 95.8 % (ref 94–98)
SCAN SLIDE: NORMAL
SMALL PLATELETS BLD QL SMEAR: ADEQUATE
SMUDGE CELLS BLD QL SMEAR: ABNORMAL
SODIUM SERPL-SCNC: 147 MMOL/L (ref 136–145)
VARIANT LYMPHS NFR BLD MANUAL: 3 % (ref 0–5)
VARIANT LYMPHS NFR BLD MANUAL: 59 % (ref 19.6–45.3)
VENTILATOR MODE: AC
VT ON VENT VENT: 450 ML
WBC NRBC COR # BLD: 18.4 10*3/MM3 (ref 3.4–10.8)

## 2023-10-22 PROCEDURE — 94664 DEMO&/EVAL PT USE INHALER: CPT

## 2023-10-22 PROCEDURE — 25010000002 FENTANYL CITRATE (PF) 50 MCG/ML SOLUTION: Performed by: NURSE PRACTITIONER

## 2023-10-22 PROCEDURE — 94799 UNLISTED PULMONARY SVC/PX: CPT

## 2023-10-22 PROCEDURE — 63710000001 INSULIN GLARGINE PER 5 UNITS: Performed by: INTERNAL MEDICINE

## 2023-10-22 PROCEDURE — 94761 N-INVAS EAR/PLS OXIMETRY MLT: CPT

## 2023-10-22 PROCEDURE — 84100 ASSAY OF PHOSPHORUS: CPT | Performed by: STUDENT IN AN ORGANIZED HEALTH CARE EDUCATION/TRAINING PROGRAM

## 2023-10-22 PROCEDURE — 94003 VENT MGMT INPAT SUBQ DAY: CPT

## 2023-10-22 PROCEDURE — 25010000002 PIPERACILLIN SOD-TAZOBACTAM PER 1 G: Performed by: INTERNAL MEDICINE

## 2023-10-22 PROCEDURE — 80053 COMPREHEN METABOLIC PANEL: CPT | Performed by: STUDENT IN AN ORGANIZED HEALTH CARE EDUCATION/TRAINING PROGRAM

## 2023-10-22 PROCEDURE — 82948 REAGENT STRIP/BLOOD GLUCOSE: CPT

## 2023-10-22 PROCEDURE — 25010000002 ENOXAPARIN PER 10 MG: Performed by: INTERNAL MEDICINE

## 2023-10-22 PROCEDURE — 63710000001 INSULIN LISPRO (HUMAN) PER 5 UNITS: Performed by: INTERNAL MEDICINE

## 2023-10-22 PROCEDURE — 71045 X-RAY EXAM CHEST 1 VIEW: CPT

## 2023-10-22 PROCEDURE — 85025 COMPLETE CBC W/AUTO DIFF WBC: CPT | Performed by: NURSE PRACTITIONER

## 2023-10-22 PROCEDURE — 25010000002 HYDRALAZINE PER 20 MG: Performed by: STUDENT IN AN ORGANIZED HEALTH CARE EDUCATION/TRAINING PROGRAM

## 2023-10-22 PROCEDURE — 82330 ASSAY OF CALCIUM: CPT | Performed by: STUDENT IN AN ORGANIZED HEALTH CARE EDUCATION/TRAINING PROGRAM

## 2023-10-22 PROCEDURE — 83735 ASSAY OF MAGNESIUM: CPT | Performed by: STUDENT IN AN ORGANIZED HEALTH CARE EDUCATION/TRAINING PROGRAM

## 2023-10-22 PROCEDURE — 82803 BLOOD GASES ANY COMBINATION: CPT

## 2023-10-22 PROCEDURE — 85007 BL SMEAR W/DIFF WBC COUNT: CPT | Performed by: NURSE PRACTITIONER

## 2023-10-22 RX ORDER — AMLODIPINE BESYLATE 5 MG/1
10 TABLET ORAL
Status: DISCONTINUED | OUTPATIENT
Start: 2023-10-23 | End: 2023-10-22

## 2023-10-22 RX ORDER — AMLODIPINE BESYLATE 5 MG/1
5 TABLET ORAL
Status: DISCONTINUED | OUTPATIENT
Start: 2023-10-23 | End: 2023-10-23

## 2023-10-22 RX ORDER — FENTANYL CITRATE 50 UG/ML
25 INJECTION, SOLUTION INTRAMUSCULAR; INTRAVENOUS ONCE
Status: COMPLETED | OUTPATIENT
Start: 2023-10-22 | End: 2023-10-22

## 2023-10-22 RX ORDER — AMOXICILLIN 250 MG
2 CAPSULE ORAL 2 TIMES DAILY
Status: DISCONTINUED | OUTPATIENT
Start: 2023-10-22 | End: 2023-11-06 | Stop reason: HOSPADM

## 2023-10-22 RX ORDER — AMLODIPINE BESYLATE 5 MG/1
5 TABLET ORAL
Status: DISCONTINUED | OUTPATIENT
Start: 2023-10-22 | End: 2023-10-22

## 2023-10-22 RX ORDER — POLYETHYLENE GLYCOL 3350 17 G/17G
17 POWDER, FOR SOLUTION ORAL DAILY PRN
Status: DISCONTINUED | OUTPATIENT
Start: 2023-10-22 | End: 2023-11-06 | Stop reason: HOSPADM

## 2023-10-22 RX ORDER — POLYETHYLENE GLYCOL 3350 17 G/17G
17 POWDER, FOR SOLUTION ORAL DAILY
Status: DISCONTINUED | OUTPATIENT
Start: 2023-10-22 | End: 2023-10-25

## 2023-10-22 RX ADMIN — BUMETANIDE 2 MG: 0.25 INJECTION, SOLUTION INTRAMUSCULAR; INTRAVENOUS at 17:37

## 2023-10-22 RX ADMIN — BUMETANIDE 2 MG: 0.25 INJECTION, SOLUTION INTRAMUSCULAR; INTRAVENOUS at 05:20

## 2023-10-22 RX ADMIN — Medication 10 ML: at 21:28

## 2023-10-22 RX ADMIN — BUDESONIDE 0.5 MG: 0.5 INHALANT RESPIRATORY (INHALATION) at 07:50

## 2023-10-22 RX ADMIN — FENTANYL CITRATE 25 MCG: 50 INJECTION, SOLUTION INTRAMUSCULAR; INTRAVENOUS at 18:44

## 2023-10-22 RX ADMIN — INSULIN LISPRO 12 UNITS: 100 INJECTION, SOLUTION INTRAVENOUS; SUBCUTANEOUS at 17:37

## 2023-10-22 RX ADMIN — Medication 10 ML: at 08:02

## 2023-10-22 RX ADMIN — Medication 10 ML: at 08:01

## 2023-10-22 RX ADMIN — SERTRALINE 150 MG: 100 TABLET, FILM COATED ORAL at 08:00

## 2023-10-22 RX ADMIN — INSULIN LISPRO 12 UNITS: 100 INJECTION, SOLUTION INTRAVENOUS; SUBCUTANEOUS at 12:45

## 2023-10-22 RX ADMIN — PIPERACILLIN AND TAZOBACTAM 4.5 G: 4; .5 INJECTION, POWDER, FOR SOLUTION INTRAVENOUS at 12:53

## 2023-10-22 RX ADMIN — PIPERACILLIN AND TAZOBACTAM 4.5 G: 4; .5 INJECTION, POWDER, FOR SOLUTION INTRAVENOUS at 00:49

## 2023-10-22 RX ADMIN — GUAIFENESIN 400 MG: 200 TABLET ORAL at 05:20

## 2023-10-22 RX ADMIN — LEVOTHYROXINE SODIUM 100 MCG: 0.1 TABLET ORAL at 05:20

## 2023-10-22 RX ADMIN — INSULIN LISPRO 12 UNITS: 100 INJECTION, SOLUTION INTRAVENOUS; SUBCUTANEOUS at 01:30

## 2023-10-22 RX ADMIN — ROSUVASTATIN 10 MG: 10 TABLET, FILM COATED ORAL at 21:28

## 2023-10-22 RX ADMIN — AMLODIPINE BESYLATE 5 MG: 5 TABLET ORAL at 12:45

## 2023-10-22 RX ADMIN — LANSOPRAZOLE 30 MG: 30 TABLET, ORALLY DISINTEGRATING ORAL at 05:20

## 2023-10-22 RX ADMIN — HYDRALAZINE HYDROCHLORIDE 10 MG: 20 INJECTION INTRAMUSCULAR; INTRAVENOUS at 17:37

## 2023-10-22 RX ADMIN — ENOXAPARIN SODIUM 30 MG: 100 INJECTION SUBCUTANEOUS at 16:10

## 2023-10-22 RX ADMIN — INSULIN GLARGINE 10 UNITS: 100 INJECTION, SOLUTION SUBCUTANEOUS at 14:09

## 2023-10-22 RX ADMIN — INSULIN LISPRO 12 UNITS: 100 INJECTION, SOLUTION INTRAVENOUS; SUBCUTANEOUS at 06:17

## 2023-10-22 RX ADMIN — Medication 2 TABLET: at 08:00

## 2023-10-22 RX ADMIN — HYDRALAZINE HYDROCHLORIDE 10 MG: 20 INJECTION INTRAMUSCULAR; INTRAVENOUS at 22:38

## 2023-10-22 NOTE — PROGRESS NOTES
Infectious Diseases Progress Note      LOS: 10 days   Patient Care Team:  Asia Queen APRN as PCP - General (Nurse Practitioner)  Asia Queen APRN as Nurse Practitioner (Nurse Practitioner)  Ling Bolden MD as Consulting Physician (Hematology and Oncology)    Chief Complaint: Intubated on the ventilator    Subjective     The patient had no fever during the last 24 hours.  She remained on norepinephrine drip.  She remained intubated on the ventilator but currently on 50% FiO2.  The patient is currently off sedation and started to respond    Review of Systems:   Review of Systems   Unable to perform ROS: Intubated        Objective     Vital Signs  Temp:  [99.4 °F (37.4 °C)-99.9 °F (37.7 °C)] 99.6 °F (37.6 °C)  Heart Rate:  [77-99] 96  Resp:  [22-24] 24  BP: (127-177)/(45-65) 177/61  FiO2 (%):  [50 %] 50 %    Physical Exam:  Physical Exam  Constitutional:       Comments: Intubated and sedated on the ventilator   HENT:      Head: Normocephalic and atraumatic.   Eyes:      Pupils: Pupils are equal, round, and reactive to light.   Cardiovascular:      Rate and Rhythm: Normal rate and regular rhythm.   Pulmonary:      Breath sounds: Rales present.   Abdominal:      General: Abdomen is flat. Bowel sounds are normal.      Palpations: Abdomen is soft.      Comments: NG tube   Genitourinary:     Comments: Bellamy catheter  Musculoskeletal:      Cervical back: Neck supple.      Right lower leg: No edema.      Left lower leg: No edema.   Neurological:      Comments: Sedated          Results Review:    I have reviewed all clinical data, test, lab, and imaging results.     Radiology  XR Chest 1 View    Result Date: 10/22/2023  XR CHEST 1 VW Date of Exam: 10/22/2023 7:38 AM CDT Indication: Resp failure Comparison: 10/18/2023 Findings: Cardiomediastinal silhouette is unchanged. There is an endotracheal tube with the between the thoracic inlet and jaden. There is a gastric tube with most distal visualized  portion overlying the proximal to mid stomach. There is a left-sided PICC with tip in the left brachiocephalic vein. There is mild pulmonary vascular congestion. There is minimal left basilar airspace disease with small left effusion. No pneumothorax. No acute osseous abnormality identified.     Impression: No significant interval change noted. Electronically Signed: Yohan Merida MD  10/22/2023 8:01 AM CDT  Workstation ID: [id number]     Cardiology    Laboratory    Results from last 7 days   Lab Units 10/22/23  0455 10/21/23  0455 10/20/23  0449 10/19/23  0413 10/18/23  0453 10/17/23  0424 10/16/23  0429   WBC 10*3/mm3 18.40* 46.90* 41.20* 34.20* 30.20* 14.40* 17.40*   HEMOGLOBIN g/dL 8.6* 9.6* 9.7* 9.4* 8.9* 10.3* 10.3*   HEMATOCRIT % 27.9* 31.8* 31.3* 30.4* 28.3* 33.8* 33.3*   PLATELETS 10*3/mm3 200 269 220 195 166 122* 131*     Results from last 7 days   Lab Units 10/22/23  0455 10/21/23  0455 10/20/23  0449 10/19/23  1204 10/19/23  0413 10/18/23  0453 10/17/23  0424 10/16/23  0429   SODIUM mmol/L 147* 143 141  --  140 138 143 143   POTASSIUM mmol/L 4.6 4.9 3.7 3.8 3.1* 3.6 3.7 4.2   CHLORIDE mmol/L 107 105 103  --  102 99 103 106   CO2 mmol/L 26.0 24.0 24.0  --  23.0 24.0 24.0 23.0   BUN mg/dL 73* 66* 57*  --  62* 61* 65* 60*   CREATININE mg/dL 2.18* 2.32* 2.14*  --  1.96* 2.08* 1.54* 1.48*   GLUCOSE mg/dL 257* 232* 253*  --  127* 147* 183* 131*   ALBUMIN g/dL 3.3* 3.0* 2.9*  --  2.8* 3.0* 3.3*  --    BILIRUBIN mg/dL 0.2 0.2 0.2  --  0.2 0.2 0.3  --    ALK PHOS U/L 155* 182* 203*  --  218* 97 110  --    AST (SGOT) U/L 19 21 44*  --  106* 23 11  --    ALT (SGPT) U/L 24 30 49*  --  63* 11 9  --    CALCIUM mg/dL 9.5 9.0 8.8  --  8.6 8.6 9.3 9.4                 Microbiology   Microbiology Results (last 10 days)       Procedure Component Value - Date/Time    MRSA Screen, PCR (Inpatient) - Swab, Nares [[id number]]  (Normal) Collected: 10/18/23 1546    Lab Status: Final result Specimen: Swab from Nares Updated: 10/18/23  1727     MRSA PCR No MRSA Detected    Narrative:      The negative predictive value of this diagnostic test is high and should only be used to consider de-escalating anti-MRSA therapy. A positive result may indicate colonization with MRSA and must be correlated clinically.    Blood Culture - Blood, Hand, Right [[id number]]  (Normal) Collected: 10/18/23 1440    Lab Status: Preliminary result Specimen: Blood from Hand, Right Updated: 10/21/23 1501     Blood Culture No growth at 3 days    Narrative:      Less than seven (7) mL's of blood was collected.  Insufficient quantity may yield false negative results.    AFB Culture - Wash, Bronchus [[id number]] Collected: 10/18/23 1440    Lab Status: Preliminary result Specimen: Wash from Bronchus Updated: 10/19/23 1047     AFB Stain No acid fast bacilli seen on concentrated smear    Respiratory Culture - Wash, Bronchus [[id number]] Collected: 10/18/23 1440    Lab Status: Final result Specimen: Wash from Bronchus Updated: 10/20/23 0910     Respiratory Culture Light growth (2+) Normal respiratory franci. No S. aureus or Pseudomonas aeruginosa detected. Final report.     Gram Stain Many (4+) WBCs seen      Rare (1+) Gram positive cocci in clusters    Blood Culture - Blood, Hand, Right [[id number]]  (Normal) Collected: 10/18/23 1437    Lab Status: Preliminary result Specimen: Blood from Hand, Right Updated: 10/21/23 1501     Blood Culture No growth at 3 days    Narrative:      Less than seven (7) mL's of blood was collected.  Insufficient quantity may yield false negative results.    Respiratory Culture - Sputum, ET Suction [[id number]] Collected: 10/15/23 1354    Lab Status: Final result Specimen: Sputum from ET Suction Updated: 10/18/23 1030     Respiratory Culture Scant growth (1+) Normal respiratory franci. No S. aureus or Pseudomonas aeruginosa detected. Final report.     Gram Stain Moderate (3+) WBCs per low power field      Few (2+) Gram positive cocci in clusters    Blood Culture  - Blood, Hand, Left [[id number]]  (Normal) Collected: 10/14/23 1620    Lab Status: Final result Specimen: Blood from Hand, Left Updated: 10/19/23 1701     Blood Culture No growth at 5 days    Urine Culture - Urine, Straight Cath [[id number]]  (Abnormal)  (Susceptibility) Collected: 10/14/23 1342    Lab Status: Final result Specimen: Urine from Straight Cath Updated: 10/17/23 0224     Urine Culture >100,000 CFU/mL Klebsiella pneumoniae ssp pneumoniae      >100,000 CFU/mL Escherichia coli    Narrative:      Colonization of the urinary tract without infection is common. Treatment is discouraged unless the patient is symptomatic, pregnant, or undergoing an invasive urologic procedure.    Susceptibility        Klebsiella pneumoniae ssp pneumoniae      JUNA      Ampicillin Resistant      Ampicillin + Sulbactam Susceptible      Cefazolin Susceptible      Cefepime Susceptible      Ceftazidime Susceptible      Ceftriaxone Susceptible      Gentamicin Susceptible      Levofloxacin Susceptible      Nitrofurantoin Intermediate      Piperacillin + Tazobactam Susceptible      Trimethoprim + Sulfamethoxazole Susceptible                       Susceptibility        Escherichia coli      JUAN      Ampicillin Susceptible      Ampicillin + Sulbactam Susceptible      Cefazolin Susceptible      Cefepime Susceptible      Ceftazidime Susceptible      Ceftriaxone Susceptible      Gentamicin Susceptible      Levofloxacin Susceptible      Nitrofurantoin Susceptible      Piperacillin + Tazobactam Susceptible      Trimethoprim + Sulfamethoxazole Susceptible                                   Medication Review:       Schedule Meds  bumetanide, 2 mg, Intravenous, Q12H  calcium 500 mg vitamin D 5 mcg (200 UT), 2 tablet, Nasogastric, Daily  enoxaparin, 30 mg, Subcutaneous, Daily  ferrous sulfate, 300 mg, Nasogastric, Once per day on Mon Wed Fri  insulin lispro, 4-24 Units, Subcutaneous, Q6H  lactulose, 20 g, Nasogastric, Once  lansoprazole, 30 mg,  Nasogastric, Q AM  levothyroxine, 100 mcg, Nasogastric, Q AM  piperacillin-tazobactam, 4.5 g, Intravenous, Q12H  senna-docusate sodium, 1 tablet, Nasogastric, BID   And  polyethylene glycol, 17 g, Nasogastric, BID  rosuvastatin, 10 mg, Nasogastric, Nightly  sertraline, 150 mg, Nasogastric, Daily  sodium chloride, 10 mL, Intravenous, Q12H  sodium chloride, 10 mL, Intravenous, Q12H        Infusion Meds  HYDROmorphone 1 mg/ml, 0.2-3 mg/hr, Last Rate: Stopped (10/21/23 1819)  ketamine (KETALAR) 500 mg/500 mL ns infusion, 0.06-2.5 mg/kg/hr, Last Rate: Stopped (10/21/23 1845)  midazolam, 1-10 mg/hr, Last Rate: Stopped (10/21/23 1845)  norepinephrine, 0.05-0.5 mcg/kg/min, Last Rate: Stopped (10/22/23 1139)  Pharmacy Consult - Pharmacy to dose,   phenylephrine, 0.5-6 mcg/kg/min, Last Rate: Stopped (10/21/23 1030)        PRN Meds    acetaminophen    senna-docusate sodium **AND** polyethylene glycol **AND** bisacodyl **AND** bisacodyl    cyclobenzaprine    dextrose    dextrose    dextrose    fentaNYL citrate (PF)    glucagon (human recombinant)    hydrALAZINE    ipratropium-albuterol    lidocaine    midazolam    nitroglycerin    ondansetron    oxyCODONE    Pharmacy Consult - Pharmacy to dose    [COMPLETED] Insert Peripheral IV **AND** sodium chloride    sodium chloride    sodium chloride    sodium chloride    sodium chloride        Assessment & Plan       Antimicrobial Therapy   1.  IV Zosyn        2.        3.        4.        5.            Assessment     Acute respiratory failure.  Most likely secondary to hospital-acquired pneumonia.  CT scan of the chest on October 18 showed bilateral lower lobe infiltrates.  There was no additional findings consistent with PCP pneumonia  The patient is currently on ventilator on 50% FiO2 and 8 PEEP  Patient is s/p bronchoscopy and BAL cultures are negative  MRSA screen was negative     Severe lymphocytosis.  Most likely secondary to underlying CLL.  Patient was not on treatment prior to  admission     History of breast cancer s/p mastectomies in the past     UTI with Klebsiella pneumoniae and E. coli.  Organisms were relatively susceptible     Diabetes mellitus type 2     Rhinovirus infection.  Nasal swab was positive for rhinovirus PCR    Mild septic shock-currently on very low-dose of norepinephrine drip     Plan     Continue Zosyn 4.5 g IV every 12 hours for a total of 7 days, last day will be October 25  Continue supportive care  A.m. labs  Okay to discontinue droplet isolation for rhinovirus  The case was discussed with the patient's daughter at bedside  Case was discussed with ICU service        Damian Santos MD  10/22/23  11:51 EDT    Note is dictated utilizing voice recognition software/Dragon

## 2023-10-22 NOTE — PLAN OF CARE
Goal Outcome Evaluation:  Plan of Care Reviewed With: daughter           Outcome Evaluation: All sedatives weaned to discontinuation during day.. Throughout night patient has become slightly more responsive but likely continues to have residual sedation and analgesia in system from deep sedation for several days. Now responds with excessive cough when suctioned, grimace to pain, attempts to open eyes with repeated stimulation. Levophed dose decreased a great deal, now minimal. Off Bumex drip, good urine output still.

## 2023-10-22 NOTE — PROGRESS NOTES
Nutrition Services  Patient Name: Diane Guan  YOB: 1940  MRN: [id number]  Admission date: 10/10/2023    PPE Documentation        PPE Worn By Provider Did not enter room for this encounter   PPE Worn By Patient  N/A     PROGRESS NOTE      Encounter Information: Progress note to check on TF. Peptamen AF is infusing at 60mL/hour, with residuals minimal. Remains on vent support. Currently has 1 pressor infusing and no sedation. This morning, nephrologist increased free water flush to 50mL/hour.       PO Diet: NPO Diet NPO Type: Strict NPO   PO Supplements: None ordered    PO Intake:  NPO       Current nutrition support: Peptamen AF at 60 mL/hour + 50 mL/hour water flush   No longer being proned   Nutrition support review: Documented at 60 mL/hour per EMR today 10/22       Labs (reviewed below): Mild hypernatremia - free water adjusted by nephrologist already  Hyperglycemia - TF is very CHO-controlled        GI Function:  Last documented BM 10/22  Residuals WNL       Nutrition Intervention Updates: Continue Peptaman AF at 60 mL/hour + 50 mL/hour water flush      Results from last 7 days   Lab Units 10/22/23  0455 10/21/23  0455 10/20/23  0449   SODIUM mmol/L 147* 143 141   POTASSIUM mmol/L 4.6 4.9 3.7   CHLORIDE mmol/L 107 105 103   CO2 mmol/L 26.0 24.0 24.0   BUN mg/dL 73* 66* 57*   CREATININE mg/dL 2.18* 2.32* 2.14*   CALCIUM mg/dL 9.5 9.0 8.8   BILIRUBIN mg/dL 0.2 0.2 0.2   ALK PHOS U/L 155* 182* 203*   ALT (SGPT) U/L 24 30 49*   AST (SGOT) U/L 19 21 44*   GLUCOSE mg/dL 257* 232* 253*     Results from last 7 days   Lab Units 10/22/23  0455 10/21/23  0455 10/20/23  0449   MAGNESIUM mg/dL 1.8 1.8 1.8   PHOSPHORUS mg/dL 3.2 4.6* 2.9   HEMOGLOBIN g/dL 8.6* 9.6* 9.7*   HEMATOCRIT % 27.9* 31.8* 31.3*     COVID19   Date Value Ref Range Status   10/10/2023 Not Detected Not Detected - Ref. Range Final     Lab Results   Component Value Date    HGBA1C 6.50 (H) 10/13/2023     RD to follow up per  protocol.    Electronically signed by:  Kita Chino RD  10/22/23 15:52 EDT

## 2023-10-22 NOTE — PROGRESS NOTES
Critical Care Progress Note   Diane Guan : 1940 MRN:[id number] LOS:10     Principal Problem: Acute hypoxemic respiratory failure     Reason for follow up: All the medical problems listed below    Summary     A 83 y.o. female with PMH of diabetes, hypertension, breast cancer presented to the hospital after a fall and was admitted with a principal diagnosis of Acute hypoxemic respiratory failure.  On admission, patient required 2 L nasal cannula and subsequently had worsening oxygenation with hypercapnic respiratory failure.  Failed BiPAP and subsequently was intubated on 10/15.  Initially treated with broad-spectrum antibiotics and ID was consulted.  RVP positive for rhinovirus, sputum cultures were negative.  Treated with diuretics for fluid overload.  Developed profound hypoxia and needed deep sedation.  Had bronch on 10/18.  Oxygenation improved with aggressive diuretics.  Mentation remained poor and subsequently had trach/PEG on 10/20.    Also had LEXUS on admission, nephrology was consulted.  Renal ultrasound with no obstruction.  Treated with diuretics.  Subsequently, urine cultures grew both E. coli and Klebsiella pneumonia.  Treated with Zosyn for 7 days.  During the hospital course, patient also developed elevated liver enzymes.  Gallbladder ultrasound showed dilated common bile duct measuring 14 mm with distended gallbladder and sludge.  GI was consulted and recommended conservative management.  Liver enzymes subsequently normalized.    Significant events     10/22/23 : Decrease FiO2 and PEEP, minimize sedation.  Add low-dose Lantus.    Assessment / Plan     Acute respiratory failure with hypoxia and hypercapnia / ARDS:   Due to fluid overload with pulmonary edema.  Possible contribution from rhinovirus infection with resultant ARDS.  Ventilator settings noted and adjusted as needed.  Wean off PEEP and FiO2.  Continue diuretics.  Mentation precluding any spontaneous breathing trials at this  time.  DC scheduled Pulmicort and DuoNebs.  Minimize sedation/analgesia to keep RASS of 0 to -1.     Septic shock due to UTI / E. coli and Klebsiella bacteremia  RVP: +Rhinovirus  BAL cultures were negative.  ID following, recommended Zosyn for a total of 7 days.  Volume status optimal, able to wean off IV vasopressors.     Acute on chronic heart failure with preserved EF:   Improved volume status.  Last ECHO showed an EF of 70% with indeterminate LV diastolic function.   Currently on scheduled Bumex, nephrology managing.  Monitor Input/Output very closely. Follow daily weights.   Net IO Since Admission: 1,968.95 mL [10/22/23 1336]    Transaminitis with dilated common bile duct  Gallbladder ultrasound with dilated CBD, measuring 14 mm with gall sludge.  GI following.  Liver enzymes normalized.     Acute toxic / metabolic encephalopathy  CT head on 10/14 with no acute pathology.  Likely due to residual effects of deep sedation for multiple days.     Acute kidney injury on CKD stage III  Remains non oliguric.  Baseline creatinine of 1.3.  Likely ATN from septic shock.  Nephrology following, continue with diuresis.  Has indwelling Bellamy catheter due to acute urinary retention.  Monitor Input/Output very closely.   Net IO Since Admission: 1,968.95 mL [10/22/23 1336]     Diabetes mellitus Type 2, not insulin-dependent : well controlled.   Hold home metformin.  Sugar slightly elevated, add scheduled Lantus.  Accu checks ACHS and c/w humalog coverage as needed.   Last A1c of 6.5.    Essential Hypertension: well controlled.   Hold home Norvasc, chlorthalidone, Cozaar and irbesartan.  Titrate medications as needed.    Constipation: On scheduled laxatives.     Primary hypothyroidism: Chronic and stable. Continue synthroid.  COPD: Chronic and stable.  Not on any home medications.  Bronchodilators as needed.  Iron deficiency anemia: On ferrous sulfate.  Dyslipidemia: Chronic and stable.  Continue with statins.  Breast cancer,  s/p right mastectomy: Resume home anastrozole when able to swallow pills.  Chronic lymphocytic leukemia  Falls at home/functional decline  Restless leg syndrome: Requip on hold.  Anxiety/depression: Zoloft on hold.  Severe obstructive sleep apnea: API of 34.5.  Not sure if she uses any home CPAP.  Refused AVAPS in the past.    Code status:   Level Of Support Discussed With: Patient  Code Status (Patient has no pulse and is not breathing): CPR (Attempt to Resuscitate)  Medical Interventions (Patient has pulse or is breathing): Full Support       Nutrition: NPO Diet NPO Type: Strict NPO   Diet, Tube Feeding Tube Feeding Formula: Peptamen AF (Vital AF 1.2); Tube Feeding Type: Continuous; Continuous Tube Feeding Start Rate (mL/hr): 10; Then Advance Rate By (mL/hr): Do Not Advance; Every __ Hours: Patient at Goal Rate; To Goal Rate of...    DVT prophylaxis:  Medical and mechanical DVT prophylaxis orders are present.     Subjective / Review of systems     Review of Systems   Intubated and obtunded.  Objective / Physical Exam   Vital signs:  Temp: 99.6 °F (37.6 °C)  BP: (!) 195/74  Heart Rate: 98  Resp: 24  SpO2: 96 %  Weight: 83.7 kg (184 lb 8.4 oz)    Admission Weight: Weight: 78.5 kg (173 lb)  Current Weight: Weight: 83.7 kg (184 lb 8.4 oz)    Input/Output in last 24 hours:    Intake/Output Summary (Last 24 hours) at 10/22/2023 1336  Last data filed at 10/22/2023 1249  Gross per 24 hour   Intake 2613.81 ml   Output 2980 ml   Net -366.19 ml      Physical Exam  Vitals and nursing note reviewed.   Constitutional:       Interventions: She is sedated and intubated.   HENT:      Head: Normocephalic and atraumatic.      Mouth/Throat:      Mouth: Mucous membranes are moist.      Comments: ET tube in place  Eyes:      General: No scleral icterus.     Conjunctiva/sclera: Conjunctivae normal.   Cardiovascular:      Rate and Rhythm: Normal rate.      Pulses: Normal pulses.      Heart sounds: No murmur heard.     No gallop.    Pulmonary:      Effort: Pulmonary effort is normal. She is intubated.      Breath sounds: Normal breath sounds. No wheezing or rales.   Abdominal:      General: Bowel sounds are normal.      Palpations: Abdomen is soft.      Tenderness: There is no abdominal tenderness.   Musculoskeletal:      Right lower leg: Edema present.      Left lower leg: Edema present.   Neurological:      Comments: Withdraws to stimuli        Radiology and Labs     Results from last 7 days   Lab Units 10/22/23  0455 10/21/23  0455 10/20/23  0449 10/19/23  0413 10/18/23  0453   WBC 10*3/mm3 18.40* 46.90* 41.20* 34.20* 30.20*   HEMATOCRIT % 27.9* 31.8* 31.3* 30.4* 28.3*   PLATELETS 10*3/mm3 200 269 220 195 166      Results from last 7 days   Lab Units 10/22/23  0455 10/21/23  0455 10/20/23  0449 10/19/23  1204 10/19/23  0413 10/18/23  0453   SODIUM mmol/L 147* 143 141  --  140 138   POTASSIUM mmol/L 4.6 4.9 3.7 3.8 3.1* 3.6   CHLORIDE mmol/L 107 105 103  --  102 99   CO2 mmol/L 26.0 24.0 24.0  --  23.0 24.0   BUN mg/dL 73* 66* 57*  --  62* 61*   CREATININE mg/dL 2.18* 2.32* 2.14*  --  1.96* 2.08*      Current medications   Scheduled Meds: [Held by provider] amLODIPine, 5 mg, Nasogastric, Q24H  bumetanide, 2 mg, Intravenous, Q12H  enoxaparin, 30 mg, Subcutaneous, Daily  ferrous sulfate, 300 mg, Nasogastric, Once per day on Mon Wed Fri  insulin glargine, 10 Units, Subcutaneous, Daily  insulin lispro, 4-24 Units, Subcutaneous, Q6H  lactulose, 20 g, Nasogastric, Once  lansoprazole, 30 mg, Nasogastric, Q AM  levothyroxine, 100 mcg, Nasogastric, Q AM  piperacillin-tazobactam, 4.5 g, Intravenous, Q12H  polyethylene glycol, 17 g, Oral, Daily  rosuvastatin, 10 mg, Nasogastric, Nightly  senna-docusate sodium, 2 tablet, Nasogastric, BID  [Held by provider] sertraline, 150 mg, Nasogastric, Daily  sodium chloride, 10 mL, Intravenous, Q12H  sodium chloride, 10 mL, Intravenous, Q12H      Continuous Infusions: Pharmacy Consult - Pharmacy to dose,        Patient continues to be critically ill, needed high complexity decision making and remains at high risk of clinical deterioration or death. I have spent a total of 31 minutes providing critical care services to this patient including but not limited to : review of labs/ microbiology/imaging/medications, serial monitoring of vital signs, adjusting ventilator settings as needed, review of other consultant's notes, monitoring input/output, review of treatment plan with bedside nurse and other treatment team, management of life support and nutrition needs.  Updated daughter at the bedside.      Foreign Bolton MD   Critical Care  10/22/23   13:36 EDT

## 2023-10-22 NOTE — PROGRESS NOTES
"RENAL/KCC:     LOS: 10 days    Patient Care Team:  Asia Queen APRN as PCP - General (Nurse Practitioner)  Asia Queen APRN as Nurse Practitioner (Nurse Practitioner)  Ling Bolden MD as Consulting Physician (Hematology and Oncology)    Chief Complaint:  Acute resp failure    Subjective     Interval History:   Chart reviewed  Remains on the vent -remains on 50% FiO2  Remains on Shahbaz  Discussed with nursing staff and patient's family at bedside  Excellent urine output but only keeping even due to high volume of IV infusions  Na up, on tube feeds    Objective     Vital Sign Min/Max for last 24 hours  Temp  Min: 98.1 °F (36.7 °C)  Max: 99.9 °F (37.7 °C)   BP  Min: 71/38  Max: 163/61   Pulse  Min: 74  Max: 99   Resp  Min: 22  Max: 24   SpO2  Min: 86 %  Max: 98 %   No data recorded   Weight  Min: 83.7 kg (184 lb 8.4 oz)  Max: 83.7 kg (184 lb 8.4 oz)     Flowsheet Rows      Flowsheet Row First Filed Value   Admission Height 162.6 cm (64\") Documented at 10/10/2023 1252   Admission Weight 78.5 kg (173 lb) Documented at 10/10/2023 1252            No intake/output data recorded.  I/O last 3 completed shifts:  In: 4627.8 [I.V.:2404.8; Other:302; NG/GT:1921]  Out: 5530 [Urine:5470; Emesis/NG output:60]    Physical Exam:  GEN: Intubated, sedated, on the ventilator  ENT: +ETT  NECK: Supple, no JVD  CHEST: Lungs are clear  CV: RRR, no M/G/R, no peripheral edema  ABD: Soft, NT, +BS  SKIN: Warm and Dry  NEURO: Sedated      WBC WBC   Date Value Ref Range Status   10/22/2023 18.40 (H) 3.40 - 10.80 10*3/mm3 Final   10/21/2023 46.90 (C) 3.40 - 10.80 10*3/mm3 Final   10/20/2023 41.20 (C) 3.40 - 10.80 10*3/mm3 Final      HGB Hemoglobin   Date Value Ref Range Status   10/22/2023 8.6 (L) 12.0 - 15.9 g/dL Final   10/21/2023 9.6 (L) 12.0 - 15.9 g/dL Final   10/20/2023 9.7 (L) 12.0 - 15.9 g/dL Final      HCT Hematocrit   Date Value Ref Range Status   10/22/2023 27.9 (L) 34.0 - 46.6 % Final   10/21/2023 31.8 (L) 34.0 - " "46.6 % Final   10/20/2023 31.3 (L) 34.0 - 46.6 % Final      Platlets No results found for: \"LABPLAT\"   MCV MCV   Date Value Ref Range Status   10/22/2023 87.7 79.0 - 97.0 fL Final   10/21/2023 90.1 79.0 - 97.0 fL Final   10/20/2023 89.9 79.0 - 97.0 fL Final          Sodium Sodium   Date Value Ref Range Status   10/22/2023 147 (H) 136 - 145 mmol/L Final   10/21/2023 143 136 - 145 mmol/L Final   10/20/2023 141 136 - 145 mmol/L Final      Potassium Potassium   Date Value Ref Range Status   10/22/2023 4.6 3.5 - 5.2 mmol/L Final   10/21/2023 4.9 3.5 - 5.2 mmol/L Final   10/20/2023 3.7 3.5 - 5.2 mmol/L Final   10/19/2023 3.8 3.5 - 5.2 mmol/L Final      Chloride Chloride   Date Value Ref Range Status   10/22/2023 107 98 - 107 mmol/L Final   10/21/2023 105 98 - 107 mmol/L Final   10/20/2023 103 98 - 107 mmol/L Final      CO2 CO2   Date Value Ref Range Status   10/22/2023 26.0 22.0 - 29.0 mmol/L Final   10/21/2023 24.0 22.0 - 29.0 mmol/L Final   10/20/2023 24.0 22.0 - 29.0 mmol/L Final      BUN BUN   Date Value Ref Range Status   10/22/2023 73 (H) 8 - 23 mg/dL Final   10/21/2023 66 (H) 8 - 23 mg/dL Final   10/20/2023 57 (H) 8 - 23 mg/dL Final      Creatinine Creatinine   Date Value Ref Range Status   10/22/2023 2.18 (H) 0.57 - 1.00 mg/dL Final   10/21/2023 2.32 (H) 0.57 - 1.00 mg/dL Final   10/20/2023 2.14 (H) 0.57 - 1.00 mg/dL Final      Calcium Calcium   Date Value Ref Range Status   10/22/2023 9.5 8.6 - 10.5 mg/dL Final   10/21/2023 9.0 8.6 - 10.5 mg/dL Final   10/20/2023 8.8 8.6 - 10.5 mg/dL Final      PO4 No results found for: \"CAPO4\"   Albumin Albumin   Date Value Ref Range Status   10/22/2023 3.3 (L) 3.5 - 5.2 g/dL Final   10/21/2023 3.0 (L) 3.5 - 5.2 g/dL Final   10/20/2023 2.9 (L) 3.5 - 5.2 g/dL Final      Magnesium Magnesium   Date Value Ref Range Status   10/22/2023 1.8 1.6 - 2.4 mg/dL Final   10/21/2023 1.8 1.6 - 2.4 mg/dL Final   10/20/2023 1.8 1.6 - 2.4 mg/dL Final      Uric Acid No results found for: " "\"URICACID\"        Results Review:     I reviewed the patient's new clinical results.    budesonide, 0.5 mg, Nebulization, Q12H  bumetanide, 2 mg, Intravenous, Q12H  calcium 500 mg vitamin D 5 mcg (200 UT), 2 tablet, Nasogastric, Daily  enoxaparin, 30 mg, Subcutaneous, Daily  ferrous sulfate, 300 mg, Nasogastric, Once per day on Mon Wed Fri  guaiFENesin, 400 mg, Nasogastric, Q8H  insulin lispro, 4-24 Units, Subcutaneous, Q6H  lactulose, 20 g, Nasogastric, Once  lansoprazole, 30 mg, Nasogastric, Q AM  levothyroxine, 100 mcg, Nasogastric, Q AM  piperacillin-tazobactam, 4.5 g, Intravenous, Q12H  senna-docusate sodium, 1 tablet, Nasogastric, BID   And  polyethylene glycol, 17 g, Nasogastric, BID  rosuvastatin, 10 mg, Nasogastric, Nightly  sertraline, 150 mg, Nasogastric, Daily  sodium chloride, 10 mL, Intravenous, Q12H  sodium chloride, 10 mL, Intravenous, Q12H      HYDROmorphone 1 mg/ml, 0.2-3 mg/hr, Last Rate: Stopped (10/21/23 1819)  ketamine (KETALAR) 500 mg/500 mL ns infusion, 0.06-2.5 mg/kg/hr, Last Rate: Stopped (10/21/23 1845)  midazolam, 1-10 mg/hr, Last Rate: Stopped (10/21/23 1845)  norepinephrine, 0.05-0.5 mcg/kg/min, Last Rate: 0.02 mcg/kg/min (10/22/23 0300)  Pharmacy Consult - Pharmacy to dose,   phenylephrine, 0.5-6 mcg/kg/min, Last Rate: Stopped (10/21/23 1030)        Medication Review: Reviewed    Assessment & Plan     LEXUS/CKD3b, slightly worse today  Sepsis with endorgan dysfunction  Fluid overload, improved with diuresis  Hypokalemia -largely resolved  Hypocalcemia, approaching normal levels  Severe leukocytosis  Pulmonary edema  Hypotension  Anemia related to renal failure      Acute hypoxemic respiratory failure    Mixed anxiety and depressive disorder    Primary osteoarthritis involving multiple joints    Hiatal hernia with gastroesophageal reflux    Mixed hyperlipidemia    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    Acquired hypothyroidism    Type 2 diabetes mellitus with stage 3b " chronic kidney disease, without long-term current use of insulin    Insomnia    Overactive bladder    Vitamin B 12 deficiency    Vitamin D deficiency    LIBBY (obstructive sleep apnea)    COPD (chronic obstructive pulmonary disease)    CKD (chronic kidney disease) stage 3, GFR 30-59 ml/min    History of breast cancer    Personal history of CLL (chronic lymphocytic leukemia)    History of cigarette smoking    Fall at home    Rhinovirus infection    Acute respiratory failure    Hyperkalemia    Respiratory failure    Pleural effusion    Plan:   Volume status seems to be improved and renal function is better today  Continue scheduled IV Bumex  No current indication for CRRT at this time   pharmacy to maximally concentrate all IV meds and drips as able  Increase free water with tube feeds    Renally dose IV antibiotics for GFR less than 30  Continue oral calcium and vitamin D  Discussed with family at bedside, RN        Kyle Baugh MD  [facility]  10/22/23  08:38 EDT

## 2023-10-22 NOTE — NURSING NOTE
Bags of ketamine and versed left hanging from day shift after discontinued. Bags emptied into disposal container in pharmacy closet. Witnessed by MARCELL Mcdonald RN.

## 2023-10-23 ENCOUNTER — APPOINTMENT (OUTPATIENT)
Dept: GENERAL RADIOLOGY | Facility: HOSPITAL | Age: 83
DRG: 207 | End: 2023-10-23
Payer: MEDICARE

## 2023-10-23 LAB
ALBUMIN SERPL-MCNC: 3.4 G/DL (ref 3.5–5.2)
ALBUMIN/GLOB SERPL: 0.9 G/DL
ALP SERPL-CCNC: 163 U/L (ref 39–117)
ALT SERPL W P-5'-P-CCNC: 22 U/L (ref 1–33)
ANION GAP SERPL CALCULATED.3IONS-SCNC: 15 MMOL/L (ref 5–15)
ANISOCYTOSIS BLD QL: ABNORMAL
AST SERPL-CCNC: 20 U/L (ref 1–32)
BACTERIA SPEC AEROBE CULT: NORMAL
BACTERIA SPEC AEROBE CULT: NORMAL
BILIRUB SERPL-MCNC: 0.2 MG/DL (ref 0–1.2)
BUN SERPL-MCNC: 88 MG/DL (ref 8–23)
BUN/CREAT SERPL: 51.5 (ref 7–25)
CALCIUM SPEC-SCNC: 9.9 MG/DL (ref 8.6–10.5)
CHLORIDE SERPL-SCNC: 105 MMOL/L (ref 98–107)
CO2 SERPL-SCNC: 27 MMOL/L (ref 22–29)
CREAT SERPL-MCNC: 1.71 MG/DL (ref 0.57–1)
DEPRECATED RDW RBC AUTO: 46.8 FL (ref 37–54)
EGFRCR SERPLBLD CKD-EPI 2021: 29.6 ML/MIN/1.73
ERYTHROCYTE [DISTWIDTH] IN BLOOD BY AUTOMATED COUNT: 15.4 % (ref 12.3–15.4)
FUNGUS WND CULT: ABNORMAL
GLOBULIN UR ELPH-MCNC: 3.6 GM/DL
GLUCOSE BLDC GLUCOMTR-MCNC: 201 MG/DL (ref 70–105)
GLUCOSE BLDC GLUCOMTR-MCNC: 238 MG/DL (ref 70–105)
GLUCOSE BLDC GLUCOMTR-MCNC: 274 MG/DL (ref 70–105)
GLUCOSE BLDC GLUCOMTR-MCNC: 290 MG/DL (ref 70–105)
GLUCOSE SERPL-MCNC: 219 MG/DL (ref 65–99)
HCT VFR BLD AUTO: 28.1 % (ref 34–46.6)
HGB BLD-MCNC: 8.8 G/DL (ref 12–15.9)
LYMPHOCYTES # BLD MANUAL: 13.91 10*3/MM3 (ref 0.7–3.1)
LYMPHOCYTES NFR BLD MANUAL: 3 % (ref 5–12)
MAGNESIUM SERPL-MCNC: 1.9 MG/DL (ref 1.6–2.4)
MCH RBC QN AUTO: 27.7 PG (ref 26.6–33)
MCHC RBC AUTO-ENTMCNC: 31.2 G/DL (ref 31.5–35.7)
MCV RBC AUTO: 88.6 FL (ref 79–97)
MONOCYTES # BLD: 0.68 10*3/MM3 (ref 0.1–0.9)
NEUTROPHILS # BLD AUTO: 8.21 10*3/MM3 (ref 1.7–7)
NEUTROPHILS NFR BLD MANUAL: 35 % (ref 42.7–76)
NEUTS BAND NFR BLD MANUAL: 1 % (ref 0–5)
NT-PROBNP SERPL-MCNC: 1280 PG/ML (ref 0–1800)
PLAT MORPH BLD: NORMAL
PLATELET # BLD AUTO: 226 10*3/MM3 (ref 140–450)
PMV BLD AUTO: 9.4 FL (ref 6–12)
POIKILOCYTOSIS BLD QL SMEAR: ABNORMAL
POTASSIUM SERPL-SCNC: 3.9 MMOL/L (ref 3.5–5.2)
PROT SERPL-MCNC: 7 G/DL (ref 6–8.5)
RBC # BLD AUTO: 3.17 10*6/MM3 (ref 3.77–5.28)
SCAN SLIDE: NORMAL
SODIUM SERPL-SCNC: 147 MMOL/L (ref 136–145)
VARIANT LYMPHS NFR BLD MANUAL: 61 % (ref 19.6–45.3)
WBC MORPH BLD: NORMAL
WBC NRBC COR # BLD: 22.8 10*3/MM3 (ref 3.4–10.8)

## 2023-10-23 PROCEDURE — 83880 ASSAY OF NATRIURETIC PEPTIDE: CPT | Performed by: INTERNAL MEDICINE

## 2023-10-23 PROCEDURE — 85007 BL SMEAR W/DIFF WBC COUNT: CPT | Performed by: NURSE PRACTITIONER

## 2023-10-23 PROCEDURE — 85025 COMPLETE CBC W/AUTO DIFF WBC: CPT | Performed by: NURSE PRACTITIONER

## 2023-10-23 PROCEDURE — 94003 VENT MGMT INPAT SUBQ DAY: CPT

## 2023-10-23 PROCEDURE — 25010000002 ENOXAPARIN PER 10 MG: Performed by: INTERNAL MEDICINE

## 2023-10-23 PROCEDURE — 94799 UNLISTED PULMONARY SVC/PX: CPT

## 2023-10-23 PROCEDURE — 25010000002 PIPERACILLIN SOD-TAZOBACTAM PER 1 G: Performed by: INTERNAL MEDICINE

## 2023-10-23 PROCEDURE — 80053 COMPREHEN METABOLIC PANEL: CPT | Performed by: STUDENT IN AN ORGANIZED HEALTH CARE EDUCATION/TRAINING PROGRAM

## 2023-10-23 PROCEDURE — 94761 N-INVAS EAR/PLS OXIMETRY MLT: CPT

## 2023-10-23 PROCEDURE — 25010000002 HYDRALAZINE PER 20 MG: Performed by: STUDENT IN AN ORGANIZED HEALTH CARE EDUCATION/TRAINING PROGRAM

## 2023-10-23 PROCEDURE — 87186 SC STD MICRODIL/AGAR DIL: CPT | Performed by: INTERNAL MEDICINE

## 2023-10-23 PROCEDURE — 63710000001 INSULIN LISPRO (HUMAN) PER 5 UNITS: Performed by: INTERNAL MEDICINE

## 2023-10-23 PROCEDURE — 63710000001 INSULIN GLARGINE PER 5 UNITS: Performed by: INTERNAL MEDICINE

## 2023-10-23 PROCEDURE — 82948 REAGENT STRIP/BLOOD GLUCOSE: CPT

## 2023-10-23 PROCEDURE — 25010000002 FENTANYL CITRATE (PF) 50 MCG/ML SOLUTION: Performed by: INTERNAL MEDICINE

## 2023-10-23 PROCEDURE — 87077 CULTURE AEROBIC IDENTIFY: CPT | Performed by: INTERNAL MEDICINE

## 2023-10-23 PROCEDURE — 83735 ASSAY OF MAGNESIUM: CPT | Performed by: STUDENT IN AN ORGANIZED HEALTH CARE EDUCATION/TRAINING PROGRAM

## 2023-10-23 PROCEDURE — 71045 X-RAY EXAM CHEST 1 VIEW: CPT

## 2023-10-23 PROCEDURE — 87070 CULTURE OTHR SPECIMN AEROBIC: CPT | Performed by: INTERNAL MEDICINE

## 2023-10-23 PROCEDURE — 87040 BLOOD CULTURE FOR BACTERIA: CPT | Performed by: INTERNAL MEDICINE

## 2023-10-23 PROCEDURE — 87205 SMEAR GRAM STAIN: CPT | Performed by: INTERNAL MEDICINE

## 2023-10-23 RX ORDER — BUMETANIDE 0.25 MG/ML
2 INJECTION INTRAMUSCULAR; INTRAVENOUS DAILY
Status: DISCONTINUED | OUTPATIENT
Start: 2023-10-24 | End: 2023-10-25

## 2023-10-23 RX ORDER — ROPINIROLE 0.25 MG/1
0.25 TABLET, FILM COATED ORAL NIGHTLY
Status: DISCONTINUED | OUTPATIENT
Start: 2023-10-23 | End: 2023-10-26

## 2023-10-23 RX ORDER — DEXMEDETOMIDINE HYDROCHLORIDE 4 UG/ML
.2-1.5 INJECTION, SOLUTION INTRAVENOUS
Status: DISCONTINUED | OUTPATIENT
Start: 2023-10-23 | End: 2023-10-26

## 2023-10-23 RX ORDER — AMLODIPINE BESYLATE 5 MG/1
10 TABLET ORAL
Status: DISCONTINUED | OUTPATIENT
Start: 2023-10-23 | End: 2023-11-06 | Stop reason: HOSPADM

## 2023-10-23 RX ADMIN — HYDRALAZINE HYDROCHLORIDE 10 MG: 20 INJECTION INTRAMUSCULAR; INTRAVENOUS at 19:49

## 2023-10-23 RX ADMIN — DEXMEDETOMIDINE HYDROCHLORIDE 0.3 MCG/KG/HR: 4 INJECTION, SOLUTION INTRAVENOUS at 18:25

## 2023-10-23 RX ADMIN — INSULIN LISPRO 12 UNITS: 100 INJECTION, SOLUTION INTRAVENOUS; SUBCUTANEOUS at 00:47

## 2023-10-23 RX ADMIN — INSULIN LISPRO 12 UNITS: 100 INJECTION, SOLUTION INTRAVENOUS; SUBCUTANEOUS at 13:56

## 2023-10-23 RX ADMIN — HYDRALAZINE HYDROCHLORIDE 10 MG: 20 INJECTION INTRAMUSCULAR; INTRAVENOUS at 09:05

## 2023-10-23 RX ADMIN — INSULIN LISPRO 8 UNITS: 100 INJECTION, SOLUTION INTRAVENOUS; SUBCUTANEOUS at 05:27

## 2023-10-23 RX ADMIN — FENTANYL CITRATE 25 MCG: 50 INJECTION, SOLUTION INTRAMUSCULAR; INTRAVENOUS at 19:49

## 2023-10-23 RX ADMIN — LANSOPRAZOLE 30 MG: 30 TABLET, ORALLY DISINTEGRATING ORAL at 05:27

## 2023-10-23 RX ADMIN — ROPINIROLE HYDROCHLORIDE 0.25 MG: 0.25 TABLET, FILM COATED ORAL at 20:03

## 2023-10-23 RX ADMIN — PIPERACILLIN AND TAZOBACTAM 4.5 G: 4; .5 INJECTION, POWDER, FOR SOLUTION INTRAVENOUS at 13:56

## 2023-10-23 RX ADMIN — Medication 10 ML: at 09:02

## 2023-10-23 RX ADMIN — SERTRALINE 150 MG: 100 TABLET, FILM COATED ORAL at 09:02

## 2023-10-23 RX ADMIN — Medication 300 MG: at 09:02

## 2023-10-23 RX ADMIN — ENOXAPARIN SODIUM 30 MG: 100 INJECTION SUBCUTANEOUS at 16:32

## 2023-10-23 RX ADMIN — FENTANYL CITRATE 25 MCG: 50 INJECTION, SOLUTION INTRAMUSCULAR; INTRAVENOUS at 15:59

## 2023-10-23 RX ADMIN — BUMETANIDE 2 MG: 0.25 INJECTION, SOLUTION INTRAMUSCULAR; INTRAVENOUS at 05:27

## 2023-10-23 RX ADMIN — AMLODIPINE BESYLATE 10 MG: 5 TABLET ORAL at 09:01

## 2023-10-23 RX ADMIN — LEVOTHYROXINE SODIUM 100 MCG: 0.1 TABLET ORAL at 05:27

## 2023-10-23 RX ADMIN — ROSUVASTATIN 10 MG: 10 TABLET, FILM COATED ORAL at 20:03

## 2023-10-23 RX ADMIN — PIPERACILLIN AND TAZOBACTAM 4.5 G: 4; .5 INJECTION, POWDER, FOR SOLUTION INTRAVENOUS at 00:47

## 2023-10-23 RX ADMIN — DEXMEDETOMIDINE HYDROCHLORIDE 0.3 MCG/KG/HR: 4 INJECTION, SOLUTION INTRAVENOUS at 01:10

## 2023-10-23 RX ADMIN — Medication 10 ML: at 20:03

## 2023-10-23 RX ADMIN — INSULIN GLARGINE 10 UNITS: 100 INJECTION, SOLUTION SUBCUTANEOUS at 09:02

## 2023-10-23 RX ADMIN — INSULIN LISPRO 8 UNITS: 100 INJECTION, SOLUTION INTRAVENOUS; SUBCUTANEOUS at 18:25

## 2023-10-23 RX ADMIN — INSULIN GLARGINE 10 UNITS: 100 INJECTION, SOLUTION SUBCUTANEOUS at 10:11

## 2023-10-23 NOTE — PROGRESS NOTES
Critical Care Progress Note   Diane Guan : 1940 MRN:[id number] LOS:11     Principal Problem: Acute hypoxemic respiratory failure     Reason for follow up: All the medical problems listed below    Summary     A 83 y.o. female with PMH of diabetes, hypertension, breast cancer presented to the hospital after a fall and was admitted with a principal diagnosis of Acute hypoxemic respiratory failure.  On admission, patient required 2 L nasal cannula and subsequently had worsening oxygenation with hypercapnic respiratory failure.  Failed BiPAP and subsequently was intubated on 10/15.  Initially treated with broad-spectrum antibiotics and ID was consulted.  RVP positive for rhinovirus, sputum cultures were negative.  Treated with diuretics for fluid overload.  Developed profound hypoxia and needed deep sedation.  Had bronch on 10/18.  Oxygenation improved with aggressive diuretics.  Mentation remained poor and subsequently had trach/PEG on 10/20.    Also had LEXUS on admission, nephrology was consulted.  Renal ultrasound with no obstruction.  Treated with diuretics.  Subsequently, urine cultures grew both E. coli and Klebsiella pneumonia.  Treated with Zosyn for 7 days.  During the hospital course, patient also developed elevated liver enzymes.  Gallbladder ultrasound showed dilated common bile duct measuring 14 mm with distended gallbladder and sludge.  GI was consulted and recommended conservative management.  Liver enzymes subsequently normalized.    Significant events     10/23/23 : Decrease FiO2 and PEEP, minimize sedation.  Increase Lantus and Norvasc.  Decreased Bumex dosing.  SBT when more awake.    Assessment / Plan     Acute respiratory failure with hypoxia and hypercapnia / ARDS:   Due to fluid overload with pulmonary edema.  Possible contribution from rhinovirus infection with resultant ARDS.  Ventilator settings noted and adjusted as needed.  Currently down to PEEP of 5 and 40% FiO2.  Mentation  precluding any spontaneous breathing trials at this time.  Minimize sedation/analgesia to keep RASS of 0 to -1.     Septic shock due to UTI / E. coli and Klebsiella bacteremia  RVP: +Rhinovirus  BAL cultures were negative.  ID following, recommended Zosyn for a total of 7 days.  Volume status optimal, able to wean off IV vasopressors.     Acute on chronic heart failure with preserved EF:   Improved volume status.  Last ECHO showed an EF of 70% with indeterminate LV diastolic function.   Discussed with nephrology, will slow down the diuretics.  Currently on Bumex once a day.  Resume home chlorthalidone when appropriate.  Monitor Input/Output very closely. Follow daily weights.   Net IO Since Admission: 3,471.95 mL [10/23/23 1108]    Transaminitis with dilated common bile duct  Gallbladder ultrasound with dilated CBD, measuring 14 mm with gall sludge.  GI following.  Liver enzymes normalized.     Acute toxic / metabolic encephalopathy  CT head on 10/14 with no acute pathology.  Likely due to residual effects of deep sedation for multiple days.     Acute kidney injury on CKD stage III  Remains non oliguric.  Baseline creatinine of 1.3.  Likely ATN from septic shock.  Nephrology following, continue with diuresis.  Has indwelling Bellamy catheter due to acute urinary retention.  Monitor Input/Output very closely.   Net IO Since Admission: 3,471.95 mL [10/23/23 1108]     Diabetes mellitus Type 2, not insulin-dependent : well controlled.   Hold home metformin.  Sugar slightly elevated, increase Lantus to 20 units daily.  Accu checks every 6 hours and c/w humalog coverage as needed.   Last A1c of 6.5.    Essential Hypertension: well controlled.   Continue with Norvasc.  Hold home chlorthalidone, Cozaar and irbesartan.  Restart medications as needed.    Constipation: On scheduled laxatives.     Primary hypothyroidism: Chronic and stable. Continue synthroid.  COPD: Chronic and stable.  Not on any home medications.   Bronchodilators as needed.  Iron deficiency anemia: On ferrous sulfate.  Dyslipidemia: Chronic and stable.  Continue with statins.  Breast cancer, s/p right mastectomy: Resume home anastrozole when able to swallow pills.  Chronic lymphocytic leukemia  Falls at home/functional decline  Restless leg syndrome: Continue with Requip.  Anxiety/depression: Continue Zoloft  Severe obstructive sleep apnea: API of 34.5.  Not sure if she uses any home CPAP.  Refused AVAPS in the past.    Code status:   Level Of Support Discussed With: Patient  Code Status (Patient has no pulse and is not breathing): CPR (Attempt to Resuscitate)  Medical Interventions (Patient has pulse or is breathing): Full Support       Nutrition: NPO Diet NPO Type: Strict NPO   Diet, Tube Feeding Tube Feeding Formula: Peptamen AF (Vital AF 1.2); Tube Feeding Type: Continuous; Continuous Tube Feeding Start Rate (mL/hr): 60; Then Advance Rate By (mL/hr): Do Not Advance; Every __ Hours: Patient at Goal Rate; To Goal Rate of...    DVT prophylaxis:  Medical and mechanical DVT prophylaxis orders are present.     Subjective / Review of systems     Review of Systems   Intubated and somnolent.  Objective / Physical Exam   Vital signs:  Temp: 99.9 °F (37.7 °C)  BP: (!) 195/71  Heart Rate: 103  Resp: (!) 31  SpO2: 93 %  Weight: 80.2 kg (176 lb 12.9 oz)    Admission Weight: Weight: 78.5 kg (173 lb)  Current Weight: Weight: 80.2 kg (176 lb 12.9 oz)    Input/Output in last 24 hours:    Intake/Output Summary (Last 24 hours) at 10/23/2023 1108  Last data filed at 10/23/2023 0500  Gross per 24 hour   Intake 2563 ml   Output 1180 ml   Net 1383 ml      Physical Exam  Vitals and nursing note reviewed.   Constitutional:       Interventions: She is sedated and intubated.   HENT:      Head: Normocephalic and atraumatic.      Mouth/Throat:      Mouth: Mucous membranes are moist.      Comments: ET tube in place  Eyes:      General: No scleral icterus.     Conjunctiva/sclera:  Conjunctivae normal.   Cardiovascular:      Rate and Rhythm: Normal rate.      Pulses: Normal pulses.      Heart sounds: No murmur heard.     No gallop.   Pulmonary:      Effort: Pulmonary effort is normal. She is intubated.      Breath sounds: Normal breath sounds. No wheezing or rales.   Abdominal:      General: Bowel sounds are normal.      Palpations: Abdomen is soft.      Tenderness: There is no abdominal tenderness.   Musculoskeletal:      Right lower leg: No edema.      Left lower leg: No edema.   Neurological:      Comments: Withdraws to stimuli        Radiology and Labs     Results from last 7 days   Lab Units 10/23/23  0421 10/22/23  0455 10/21/23  0455 10/20/23  0449 10/19/23  0413   WBC 10*3/mm3 22.80* 18.40* 46.90* 41.20* 34.20*   HEMATOCRIT % 28.1* 27.9* 31.8* 31.3* 30.4*   PLATELETS 10*3/mm3 226 200 269 220 195      Results from last 7 days   Lab Units 10/23/23  0421 10/22/23  0455 10/21/23  0455 10/20/23  0449 10/19/23  1204 10/19/23  0413   SODIUM mmol/L 147* 147* 143 141  --  140   POTASSIUM mmol/L 3.9 4.6 4.9 3.7 3.8 3.1*   CHLORIDE mmol/L 105 107 105 103  --  102   CO2 mmol/L 27.0 26.0 24.0 24.0  --  23.0   BUN mg/dL 88* 73* 66* 57*  --  62*   CREATININE mg/dL 1.71* 2.18* 2.32* 2.14*  --  1.96*      Current medications   Scheduled Meds: amLODIPine, 10 mg, Nasogastric, Q24H  [START ON 10/24/2023] bumetanide, 2 mg, Intravenous, Daily  enoxaparin, 30 mg, Subcutaneous, Daily  ferrous sulfate, 300 mg, Nasogastric, Once per day on Mon Wed Fri  [START ON 10/24/2023] insulin glargine, 20 Units, Subcutaneous, Daily  insulin lispro, 4-24 Units, Subcutaneous, Q6H  lactulose, 20 g, Nasogastric, Once  lansoprazole, 30 mg, Nasogastric, Q AM  levothyroxine, 100 mcg, Nasogastric, Q AM  piperacillin-tazobactam, 4.5 g, Intravenous, Q12H  polyethylene glycol, 17 g, Oral, Daily  rOPINIRole, 0.25 mg, Oral, Nightly  rosuvastatin, 10 mg, Nasogastric, Nightly  senna-docusate sodium, 2 tablet, Nasogastric,  BID  sertraline, 150 mg, Nasogastric, Daily  sodium chloride, 10 mL, Intravenous, Q12H  sodium chloride, 10 mL, Intravenous, Q12H      Continuous Infusions: dexmedetomidine, 0.2-1.5 mcg/kg/hr, Last Rate: 0.1 mcg/kg/hr (10/23/23 0530)  Pharmacy Consult - Pharmacy to dose,       Plan discussed with RN. Reviewed all other data in the last 24 hours, including but not limited to vitals, labs, microbiology, imaging and pertinent notes from other providers. High complexity decision making and high risk of deterioration. Updated daughter at the bedside.    Foreign Bolton MD   Critical Care  10/23/23   11:08 EDT

## 2023-10-23 NOTE — PROGRESS NOTES
Nutrition Services  Patient Name: Diane Guan  YOB: 1940  MRN: [id number]  Admission date: 10/10/2023    PPE Documentation        PPE Worn By Provider Did not enter room for this encounter   PPE Worn By Patient  N/A     PROGRESS NOTE      Encounter Information: Progress note to check on TF. Peptamen AF is infusing at 60mL/hour, with residuals minimal. Remains on vent support.  Patient discussed in AM rounds.  On precedex.         PO Diet: NPO Diet NPO Type: Strict NPO   PO Supplements: None ordered    PO Intake:  NPO       Current nutrition support: Peptamen AF at 60 mL/hour + 50 mL/hour water flush    Nutrition support review: Documented as above per EMR       Labs (reviewed below): Mild hypernatremia - will increase water flush further   Hyperglycemia - TF is very CHO-controlled        GI Function:  Last documented BM 10/22 (yesterday)  Residuals WNL       Nutrition Intervention Updates: Continue Peptaman AF at 60 mL/hour     Increase to 75 mL/hour water flush related to hypernatremia       Results from last 7 days   Lab Units 10/23/23  0421 10/22/23  0455 10/21/23  0455   SODIUM mmol/L 147* 147* 143   POTASSIUM mmol/L 3.9 4.6 4.9   CHLORIDE mmol/L 105 107 105   CO2 mmol/L 27.0 26.0 24.0   BUN mg/dL 88* 73* 66*   CREATININE mg/dL 1.71* 2.18* 2.32*   CALCIUM mg/dL 9.9 9.5 9.0   BILIRUBIN mg/dL 0.2 0.2 0.2   ALK PHOS U/L 163* 155* 182*   ALT (SGPT) U/L 22 24 30   AST (SGOT) U/L 20 19 21   GLUCOSE mg/dL 219* 257* 232*     Results from last 7 days   Lab Units 10/23/23  0421 10/22/23  0455 10/21/23  0455   MAGNESIUM mg/dL 1.9 1.8 1.8   PHOSPHORUS mg/dL  --  3.2 4.6*   HEMOGLOBIN g/dL 8.8* 8.6* 9.6*   HEMATOCRIT % 28.1* 27.9* 31.8*     COVID19   Date Value Ref Range Status   10/10/2023 Not Detected Not Detected - Ref. Range Final     Lab Results   Component Value Date    HGBA1C 6.50 (H) 10/13/2023     RD to follow up per protocol.    Electronically signed by:  Vernell Gil RD  10/23/23 08:05  EDT

## 2023-10-23 NOTE — PROGRESS NOTES
"RENAL/KCC:     LOS: 11 days    Patient Care Team:  Asia Queen APRN as PCP - General (Nurse Practitioner)  Asia Queen APRN as Nurse Practitioner (Nurse Practitioner)  Ling Bolden MD as Consulting Physician (Hematology and Oncology)    Chief Complaint:  Acute resp failure    Subjective     Interval History:   Chart reviewed  Remains on the vent - down to 40% FiO2  1.5L UOP  Discussed with family at bedside    Objective     Vital Sign Min/Max for last 24 hours  Temp  Min: 98.1 °F (36.7 °C)  Max: 100.1 °F (37.8 °C)   BP  Min: 164/57  Max: 195/71   Pulse  Min: 94  Max: 116   Resp  Min: 22  Max: 42   SpO2  Min: 93 %  Max: 100 %   Flow (L/min)  Min: 3  Max: 3   Weight  Min: 80.2 kg (176 lb 12.9 oz)  Max: 80.2 kg (176 lb 12.9 oz)     Flowsheet Rows      Flowsheet Row First Filed Value   Admission Height 162.6 cm (64\") Documented at 10/10/2023 1252   Admission Weight 78.5 kg (173 lb) Documented at 10/10/2023 1252            No intake/output data recorded.  I/O last 3 completed shifts:  In: 4198 [I.V.:481; Other:1070; NG/GT:2647]  Out: 2885 [Urine:2825; Emesis/NG output:60]    Physical Exam:  GEN: Intubated, sedated  ENT: +ETT  NECK: Supple, no JVD  CHEST: Lungs are clear  CV: RRR, no M/G/R, no peripheral edema  ABD: Soft, NT, +BS  SKIN: Warm and Dry  NEURO: Sedated      WBC WBC   Date Value Ref Range Status   10/23/2023 22.80 (H) 3.40 - 10.80 10*3/mm3 Final   10/22/2023 18.40 (H) 3.40 - 10.80 10*3/mm3 Final   10/21/2023 46.90 (C) 3.40 - 10.80 10*3/mm3 Final      HGB Hemoglobin   Date Value Ref Range Status   10/23/2023 8.8 (L) 12.0 - 15.9 g/dL Final   10/22/2023 8.6 (L) 12.0 - 15.9 g/dL Final   10/21/2023 9.6 (L) 12.0 - 15.9 g/dL Final      HCT Hematocrit   Date Value Ref Range Status   10/23/2023 28.1 (L) 34.0 - 46.6 % Final   10/22/2023 27.9 (L) 34.0 - 46.6 % Final   10/21/2023 31.8 (L) 34.0 - 46.6 % Final      Platlets No results found for: \"LABPLAT\"   MCV MCV   Date Value Ref Range Status " "  10/23/2023 88.6 79.0 - 97.0 fL Final   10/22/2023 87.7 79.0 - 97.0 fL Final   10/21/2023 90.1 79.0 - 97.0 fL Final          Sodium Sodium   Date Value Ref Range Status   10/23/2023 147 (H) 136 - 145 mmol/L Final   10/22/2023 147 (H) 136 - 145 mmol/L Final   10/21/2023 143 136 - 145 mmol/L Final      Potassium Potassium   Date Value Ref Range Status   10/23/2023 3.9 3.5 - 5.2 mmol/L Final   10/22/2023 4.6 3.5 - 5.2 mmol/L Final   10/21/2023 4.9 3.5 - 5.2 mmol/L Final      Chloride Chloride   Date Value Ref Range Status   10/23/2023 105 98 - 107 mmol/L Final   10/22/2023 107 98 - 107 mmol/L Final   10/21/2023 105 98 - 107 mmol/L Final      CO2 CO2   Date Value Ref Range Status   10/23/2023 27.0 22.0 - 29.0 mmol/L Final   10/22/2023 26.0 22.0 - 29.0 mmol/L Final   10/21/2023 24.0 22.0 - 29.0 mmol/L Final      BUN BUN   Date Value Ref Range Status   10/23/2023 88 (H) 8 - 23 mg/dL Final   10/22/2023 73 (H) 8 - 23 mg/dL Final   10/21/2023 66 (H) 8 - 23 mg/dL Final      Creatinine Creatinine   Date Value Ref Range Status   10/23/2023 1.71 (H) 0.57 - 1.00 mg/dL Final   10/22/2023 2.18 (H) 0.57 - 1.00 mg/dL Final   10/21/2023 2.32 (H) 0.57 - 1.00 mg/dL Final      Calcium Calcium   Date Value Ref Range Status   10/23/2023 9.9 8.6 - 10.5 mg/dL Final   10/22/2023 9.5 8.6 - 10.5 mg/dL Final   10/21/2023 9.0 8.6 - 10.5 mg/dL Final      PO4 No results found for: \"CAPO4\"   Albumin Albumin   Date Value Ref Range Status   10/23/2023 3.4 (L) 3.5 - 5.2 g/dL Final   10/22/2023 3.3 (L) 3.5 - 5.2 g/dL Final   10/21/2023 3.0 (L) 3.5 - 5.2 g/dL Final      Magnesium Magnesium   Date Value Ref Range Status   10/23/2023 1.9 1.6 - 2.4 mg/dL Final   10/22/2023 1.8 1.6 - 2.4 mg/dL Final   10/21/2023 1.8 1.6 - 2.4 mg/dL Final      Uric Acid No results found for: \"URICACID\"        Results Review:     I reviewed the patient's new clinical results.    amLODIPine, 10 mg, Nasogastric, Q24H  [START ON 10/24/2023] bumetanide, 2 mg, Intravenous, " Daily  enoxaparin, 30 mg, Subcutaneous, Daily  ferrous sulfate, 300 mg, Nasogastric, Once per day on Mon Wed Fri  insulin glargine, 10 Units, Subcutaneous, Daily  insulin lispro, 4-24 Units, Subcutaneous, Q6H  lactulose, 20 g, Nasogastric, Once  lansoprazole, 30 mg, Nasogastric, Q AM  levothyroxine, 100 mcg, Nasogastric, Q AM  piperacillin-tazobactam, 4.5 g, Intravenous, Q12H  polyethylene glycol, 17 g, Oral, Daily  rosuvastatin, 10 mg, Nasogastric, Nightly  senna-docusate sodium, 2 tablet, Nasogastric, BID  sertraline, 150 mg, Nasogastric, Daily  sodium chloride, 10 mL, Intravenous, Q12H  sodium chloride, 10 mL, Intravenous, Q12H      dexmedetomidine, 0.2-1.5 mcg/kg/hr, Last Rate: 0.1 mcg/kg/hr (10/23/23 0530)  Pharmacy Consult - Pharmacy to dose,         Medication Review: Reviewed    Assessment & Plan     LEXUS/CKD3  Sepsis  Fluid overload  Hypokalemia - from diuresis  Hypocalcemia  Pulmonary edema  Hypotension    Acute hypoxemic respiratory failure    Mixed anxiety and depressive disorder    Primary osteoarthritis involving multiple joints    Hiatal hernia with gastroesophageal reflux    Mixed hyperlipidemia    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    Acquired hypothyroidism    Type 2 diabetes mellitus with stage 3b chronic kidney disease, without long-term current use of insulin    Insomnia    Overactive bladder    Vitamin B 12 deficiency    Vitamin D deficiency    LIBBY (obstructive sleep apnea)    COPD (chronic obstructive pulmonary disease)    CKD (chronic kidney disease) stage 3, GFR 30-59 ml/min    History of breast cancer    Personal history of CLL (chronic lymphocytic leukemia)    History of cigarette smoking    Fall at home    Rhinovirus infection    Acute respiratory failure    Hyperkalemia    Respiratory failure    Pleural effusion    Plan: Cr improved to 1.7.  Volume improved and azotemia slightly worse.  Decrease Bumex to daily dosing.  Continue free water with TF's.  Continue pressor  support and scheduled Midodrine.  FC in place for urinary retention.  Vent per Pulm.  Will follow.       Frederick Bonds MD  [facility]  10/23/23  08:48 EDT

## 2023-10-23 NOTE — PLAN OF CARE
Goal Outcome Evaluation:  Plan of Care Reviewed With: daughter             Problem: Skin Injury Risk Increased  Goal: Skin Health and Integrity  Outcome: Ongoing, Progressing  Intervention: Optimize Skin Protection  Recent Flowsheet Documentation  Taken 10/23/2023 0400 by Ciaran Whitaker RN  Pressure Reduction Techniques: weight shift assistance provided  Pressure Reduction Devices:   pressure-redistributing mattress utilized   positioning supports utilized   heel offloading device utilized  Skin Protection: tubing/devices free from skin contact  Taken 10/23/2023 0000 by Ciaran Whitaker RN  Pressure Reduction Techniques:   weight shift assistance provided   heels elevated off bed  Pressure Reduction Devices:   pressure-redistributing mattress utilized   positioning supports utilized   heel offloading device utilized  Skin Protection:   tubing/devices free from skin contact   incontinence pads utilized   protective footwear used  Taken 10/22/2023 1954 by Ciaran Whitaker RN  Pressure Reduction Techniques:   weight shift assistance provided   heels elevated off bed  Head of Bed (HOB) Positioning: HOB at 30 degrees  Pressure Reduction Devices:   pressure-redistributing mattress utilized   positioning supports utilized   heel offloading device utilized  Skin Protection:   tubing/devices free from skin contact   incontinence pads utilized   protective footwear used   pulse oximeter probe site changed   silicone foam dressing in place     Problem: COPD (Chronic Obstructive Pulmonary Disease) Comorbidity  Goal: Maintenance of COPD Symptom Control  Outcome: Ongoing, Progressing  Intervention: Maintain COPD-Symptom Control  Recent Flowsheet Documentation  Taken 10/22/2023 1954 by Ciaran Whitaker RN  Medication Review/Management: medications reviewed     Problem: Diabetes Comorbidity  Goal: Blood Glucose Level Within Targeted Range  Outcome: Ongoing, Progressing  Intervention: Monitor and Manage Glycemia  Recent Flowsheet  Documentation  Taken 10/22/2023 1954 by Ciaran Whitaker RN  Glycemic Management: blood glucose monitored     Problem: Obstructive Sleep Apnea Risk or Actual Comorbidity Management  Goal: Unobstructed Breathing During Sleep  Outcome: Ongoing, Progressing     Problem: Fall Injury Risk  Goal: Absence of Fall and Fall-Related Injury  Outcome: Ongoing, Progressing  Intervention: Identify and Manage Contributors  Recent Flowsheet Documentation  Taken 10/22/2023 1954 by Ciaran Whitaker RN  Medication Review/Management: medications reviewed  Intervention: Promote Injury-Free Environment  Recent Flowsheet Documentation  Taken 10/23/2023 0500 by Ciaran Whitaker RN  Safety Promotion/Fall Prevention: safety round/check completed  Taken 10/23/2023 0300 by Ciaran Whitaker RN  Safety Promotion/Fall Prevention: safety round/check completed  Taken 10/23/2023 0100 by Ciaran Whitaker RN  Safety Promotion/Fall Prevention: safety round/check completed  Taken 10/23/2023 0000 by Ciaran Whitaker RN  Safety Promotion/Fall Prevention:   fall prevention program maintained   clutter free environment maintained   activity supervised   lighting adjusted   safety round/check completed  Taken 10/22/2023 2308 by Ciaran Whitaker RN  Safety Promotion/Fall Prevention: safety round/check completed  Taken 10/22/2023 2200 by Ciaran Whitaker RN  Safety Promotion/Fall Prevention: safety round/check completed  Taken 10/22/2023 2100 by Ciaran Whitaker RN  Safety Promotion/Fall Prevention: safety round/check completed  Taken 10/22/2023 1954 by Ciaran Whitaker RN  Safety Promotion/Fall Prevention:   fall prevention program maintained   clutter free environment maintained   activity supervised   lighting adjusted   safety round/check completed  Taken 10/22/2023 1900 by Ciaran Whitaker RN  Safety Promotion/Fall Prevention: safety round/check completed     Problem: Pain Acute  Goal: Acceptable Pain Control and Functional Ability  Outcome: Ongoing,  Progressing  Intervention: Prevent or Manage Pain  Recent Flowsheet Documentation  Taken 10/22/2023 1954 by Ciaran Whitaker RN  Medication Review/Management: medications reviewed  Intervention: Develop Pain Management Plan  Recent Flowsheet Documentation  Taken 10/23/2023 0110 by Ciaran Whitaker RN  Pain Management Interventions:   relaxation techniques promoted   see MAR   quiet environment facilitated  Intervention: Optimize Psychosocial Wellbeing  Recent Flowsheet Documentation  Taken 10/23/2023 0400 by Ciaran Whitaker RN  Diversional Activities: (not appropriate at this time) other (see comments)  Taken 10/23/2023 0200 by Ciaran Whitaker RN  Diversional Activities: other (see comments)  Taken 10/23/2023 0100 by Ciaran Whitaker RN  Diversional Activities: (no extra stimulation) other (see comments)  Taken 10/23/2023 0000 by Ciaran Whitaker RN  Diversional Activities: (daughter at bedside) other (see comments)  Taken 10/22/2023 2200 by Ciaran Whitaker RN  Diversional Activities: other (see comments)  Taken 10/22/2023 2000 by Ciaran Whitaker RN  Diversional Activities: (no extraneous stimulation) other (see comments)     Problem: Gas Exchange Impaired  Goal: Optimal Gas Exchange  Outcome: Ongoing, Progressing  Intervention: Optimize Oxygenation and Ventilation  Recent Flowsheet Documentation  Taken 10/23/2023 0000 by Ciaran Whitaker RN  Airway/Ventilation Management: calming measures promoted  Taken 10/22/2023 1954 by Ciaran Whitaker RN  Head of Bed (HOB) Positioning: HOB at 30 degrees     Problem: Adult Inpatient Plan of Care  Goal: Plan of Care Review  Outcome: Ongoing, Progressing  Flowsheets (Taken 10/23/2023 0559)  Plan of Care Reviewed With: daughter  Goal: Patient-Specific Goal (Individualized)  Outcome: Ongoing, Progressing  Goal: Absence of Hospital-Acquired Illness or Injury  Outcome: Ongoing, Progressing  Intervention: Identify and Manage Fall Risk  Recent Flowsheet Documentation  Taken 10/23/2023 0500  by Whitaker, Ciaran W, RN  Safety Promotion/Fall Prevention: safety round/check completed  Taken 10/23/2023 0300 by Ciaran Whitaker RN  Safety Promotion/Fall Prevention: safety round/check completed  Taken 10/23/2023 0100 by Ciaran Whitaker RN  Safety Promotion/Fall Prevention: safety round/check completed  Taken 10/23/2023 0000 by Ciaran Whitaker RN  Safety Promotion/Fall Prevention:   fall prevention program maintained   clutter free environment maintained   activity supervised   lighting adjusted   safety round/check completed  Taken 10/22/2023 2308 by Ciaran Whitaker RN  Safety Promotion/Fall Prevention: safety round/check completed  Taken 10/22/2023 2200 by Ciaran Whitaker RN  Safety Promotion/Fall Prevention: safety round/check completed  Taken 10/22/2023 2100 by Ciaran Whitaker RN  Safety Promotion/Fall Prevention: safety round/check completed  Taken 10/22/2023 1954 by Ciaran Whitaker RN  Safety Promotion/Fall Prevention:   fall prevention program maintained   clutter free environment maintained   activity supervised   lighting adjusted   safety round/check completed  Taken 10/22/2023 1900 by Ciaran Whitaker RN  Safety Promotion/Fall Prevention: safety round/check completed  Intervention: Prevent Skin Injury  Recent Flowsheet Documentation  Taken 10/23/2023 0500 by Ciaran Whitaker RN  Body Position:   turned   supine  Taken 10/23/2023 0400 by Ciaran Whitaker RN  Skin Protection: tubing/devices free from skin contact  Taken 10/23/2023 0300 by Ciaran Whitaker RN  Body Position:   turned   right  Taken 10/23/2023 0100 by Ciaran Whitaker RN  Body Position:   turned   supine  Taken 10/23/2023 0000 by Ciaran Whitaker RN  Body Position:   side-lying   left  Skin Protection:   tubing/devices free from skin contact   incontinence pads utilized   protective footwear used  Taken 10/22/2023 2308 by Ciaran Whitaker RN  Body Position:   turned   left  Taken 10/22/2023 2100 by Ciaran Whitaker RN  Body Position:   turned    right  Taken 10/22/2023 1954 by Ciaran Whitaker RN  Body Position:   side-lying   left  Skin Protection:   tubing/devices free from skin contact   incontinence pads utilized   protective footwear used   pulse oximeter probe site changed   silicone foam dressing in place  Taken 10/22/2023 1900 by Ciaran Whitaker RN  Body Position:   turned   left  Intervention: Prevent and Manage VTE (Venous Thromboembolism) Risk  Recent Flowsheet Documentation  Taken 10/23/2023 0000 by Ciaran Whitaker RN  Activity Management: bedrest  Range of Motion: ROM (range of motion) performed  Taken 10/22/2023 1954 by Ciaran Whitaker RN  Activity Management: bedrest  VTE Prevention/Management:   bilateral   sequential compression devices on  Range of Motion: ROM (range of motion) performed  Intervention: Prevent Infection  Recent Flowsheet Documentation  Taken 10/22/2023 1954 by Ciaran Whitaker RN  Infection Prevention:   single patient room provided   rest/sleep promoted   hand hygiene promoted  Goal: Optimal Comfort and Wellbeing  Outcome: Ongoing, Progressing  Intervention: Monitor Pain and Promote Comfort  Recent Flowsheet Documentation  Taken 10/23/2023 0110 by Ciaran Whitaker RN  Pain Management Interventions:   relaxation techniques promoted   see MAR   quiet environment facilitated  Intervention: Provide Person-Centered Care  Recent Flowsheet Documentation  Taken 10/23/2023 0400 by Ciaran Whitaker RN  Trust Relationship/Rapport: reassurance provided  Taken 10/23/2023 0000 by Ciaran Whitaker RN  Trust Relationship/Rapport:   reassurance provided   care explained  Taken 10/22/2023 1954 by Ciaran Whitaker RN  Trust Relationship/Rapport:   care explained   reassurance provided  Goal: Readiness for Transition of Care  Outcome: Ongoing, Progressing     Problem: Restraint, Nonviolent  Goal: Absence of Harm or Injury  Intervention: Implement Least Restrictive Safety Strategies  Recent Flowsheet Documentation  Taken 10/23/2023 0400 by Sherman  Ciaran CARTER RN  Medical Device Protection:   tubing secured   torso covered   IV pole/bag removed from visual field  Less Restrictive Alternative:   calming techniques promoted   coping techniques promoted   positive reinforcement provided   safety enhancements provided   sensory stimulation limited   bed alarm in use   appropriate expression promoted  De-Escalation Techniques:   stimulation decreased   reoriented   medication administered   increased round frequency   family involvement requested  Diversional Activities: (not appropriate at this time) other (see comments)  Taken 10/23/2023 0200 by Ciaran Whitaker RN  Medical Device Protection:   tubing secured   torso covered   IV pole/bag removed from visual field  Less Restrictive Alternative:   calming techniques promoted   coping techniques promoted   positive reinforcement provided   safety enhancements provided   sensory stimulation limited   bed alarm in use   appropriate expression promoted  De-Escalation Techniques:   increased round frequency   reoriented   stimulation decreased   appropriate behavior reinforced  Diversional Activities: other (see comments)  Taken 10/23/2023 0100 by Ciaran Whitaker RN  Medical Device Protection:   torso covered   tubing secured   IV pole/bag removed from visual field  Less Restrictive Alternative:   calming techniques promoted   coping techniques promoted   positive reinforcement provided   safety enhancements provided   sensory stimulation limited   bed alarm in use   appropriate expression promoted  De-Escalation Techniques:   reoriented   increased round frequency   appropriate behavior reinforced   family involvement requested   stimulation decreased  Diversional Activities: (no extra stimulation) other (see comments)  Taken 10/23/2023 0000 by Ciaran Whitaker RN  Medical Device Protection:   tubing secured   torso covered   IV pole/bag removed from visual field  Less Restrictive Alternative:   appropriate expression  promoted   bed alarm in use   calming techniques promoted   coping techniques promoted   positive reinforcement provided   safety enhancements provided   family presence promoted  De-Escalation Techniques:   reoriented   increased round frequency   family involvement requested  Diversional Activities: (daughter at bedside) other (see comments)  Taken 10/22/2023 2200 by Ciaran Whitaker, RN  Medical Device Protection:   IV pole/bag removed from visual field   torso covered   tubing secured  Less Restrictive Alternative:   appropriate expression promoted   bed alarm in use   calming techniques promoted   coping techniques promoted   positive reinforcement provided   safety enhancements provided   family presence promoted  De-Escalation Techniques:   appropriate behavior reinforced   increased round frequency   reoriented   stimulation decreased  Diversional Activities: other (see comments)  Taken 10/22/2023 2000 by Ciaran Whitaker, RN  Medical Device Protection:   tubing secured   torso covered  Less Restrictive Alternative:   appropriate expression promoted   bed alarm in use   calming techniques promoted   coping techniques promoted   positive reinforcement provided   safety enhancements provided   family presence promoted  De-Escalation Techniques:   increased round frequency   appropriate behavior reinforced   reoriented   stimulation decreased  Diversional Activities: (no extraneous stimulation) other (see comments)  Intervention: Protect Dignity, Rights, and Personal Wellbeing  Recent Flowsheet Documentation  Taken 10/23/2023 0400 by Ciaran Whitaker, RN  Trust Relationship/Rapport: reassurance provided  Taken 10/23/2023 0000 by Ciaran Whitaker, RN  Trust Relationship/Rapport:   reassurance provided   care explained  Taken 10/22/2023 1954 by Ciaran Whitaker, RN  Trust Relationship/Rapport:   care explained   reassurance provided  Intervention: Protect Skin and Joint Integrity  Recent Flowsheet Documentation  Taken  10/23/2023 0500 by Ciaran Whitaker RN  Body Position:   turned   supine  Taken 10/23/2023 0300 by Ciaran Whitaker RN  Body Position:   turned   right  Taken 10/23/2023 0100 by Ciaran Whitaker RN  Body Position:   turned   supine  Taken 10/23/2023 0000 by Ciaran Whitaker RN  Body Position:   side-lying   left  Range of Motion: ROM (range of motion) performed  Taken 10/22/2023 2308 by Ciaran Whitaker RN  Body Position:   turned   left  Taken 10/22/2023 2100 by Ciaran Whitaker RN  Body Position:   turned   right  Taken 10/22/2023 1954 by Ciaran Whitaker RN  Body Position:   side-lying   left  Range of Motion: ROM (range of motion) performed  Taken 10/22/2023 1900 by Ciaran Whitaker RN  Body Position:   turned   left     Problem: Restraint, Nonviolent  Goal: Absence of Harm or Injury  Outcome: Ongoing, Progressing  Intervention: Implement Least Restrictive Safety Strategies  Recent Flowsheet Documentation  Taken 10/23/2023 0400 by Ciaran Whitaker RN  Medical Device Protection:   tubing secured   torso covered   IV pole/bag removed from visual field  Less Restrictive Alternative:   calming techniques promoted   coping techniques promoted   positive reinforcement provided   safety enhancements provided   sensory stimulation limited   bed alarm in use   appropriate expression promoted  De-Escalation Techniques:   stimulation decreased   reoriented   medication administered   increased round frequency   family involvement requested  Diversional Activities: (not appropriate at this time) other (see comments)  Taken 10/23/2023 0200 by Ciaran Whitaker RN  Medical Device Protection:   tubing secured   torso covered   IV pole/bag removed from visual field  Less Restrictive Alternative:   calming techniques promoted   coping techniques promoted   positive reinforcement provided   safety enhancements provided   sensory stimulation limited   bed alarm in use   appropriate expression promoted  De-Escalation Techniques:   increased  round frequency   reoriented   stimulation decreased   appropriate behavior reinforced  Diversional Activities: other (see comments)  Taken 10/23/2023 0100 by Ciaran Whitaker RN  Medical Device Protection:   torso covered   tubing secured   IV pole/bag removed from visual field  Less Restrictive Alternative:   calming techniques promoted   coping techniques promoted   positive reinforcement provided   safety enhancements provided   sensory stimulation limited   bed alarm in use   appropriate expression promoted  De-Escalation Techniques:   reoriented   increased round frequency   appropriate behavior reinforced   family involvement requested   stimulation decreased  Diversional Activities: (no extra stimulation) other (see comments)  Taken 10/23/2023 0000 by Ciaran Whitaker, RN  Medical Device Protection:   tubing secured   torso covered   IV pole/bag removed from visual field  Less Restrictive Alternative:   appropriate expression promoted   bed alarm in use   calming techniques promoted   coping techniques promoted   positive reinforcement provided   safety enhancements provided   family presence promoted  De-Escalation Techniques:   reoriented   increased round frequency   family involvement requested  Diversional Activities: (daughter at bedside) other (see comments)  Taken 10/22/2023 2200 by Ciaran Whitaker RN  Medical Device Protection:   IV pole/bag removed from visual field   torso covered   tubing secured  Less Restrictive Alternative:   appropriate expression promoted   bed alarm in use   calming techniques promoted   coping techniques promoted   positive reinforcement provided   safety enhancements provided   family presence promoted  De-Escalation Techniques:   appropriate behavior reinforced   increased round frequency   reoriented   stimulation decreased  Diversional Activities: other (see comments)  Taken 10/22/2023 2000 by Ciaran Whitaker RN  Medical Device Protection:   tubing secured   torso  covered  Less Restrictive Alternative:   appropriate expression promoted   bed alarm in use   calming techniques promoted   coping techniques promoted   positive reinforcement provided   safety enhancements provided   family presence promoted  De-Escalation Techniques:   increased round frequency   appropriate behavior reinforced   reoriented   stimulation decreased  Diversional Activities: (no extraneous stimulation) other (see comments)  Intervention: Protect Dignity, Rights, and Personal Wellbeing  Recent Flowsheet Documentation  Taken 10/23/2023 0400 by Ciaran Whitaker RN  Trust Relationship/Rapport: reassurance provided  Taken 10/23/2023 0000 by Ciaran Whitaker RN  Trust Relationship/Rapport:   reassurance provided   care explained  Taken 10/22/2023 1954 by Ciaran Whitaker RN  Trust Relationship/Rapport:   care explained   reassurance provided  Intervention: Protect Skin and Joint Integrity  Recent Flowsheet Documentation  Taken 10/23/2023 0500 by Ciaran Whitaker RN  Body Position:   turned   supine  Taken 10/23/2023 0300 by Ciaran Whitaker RN  Body Position:   turned   right  Taken 10/23/2023 0100 by Ciaran Whitaker RN  Body Position:   turned   supine  Taken 10/23/2023 0000 by Ciaran Whitaker RN  Body Position:   side-lying   left  Range of Motion: ROM (range of motion) performed  Taken 10/22/2023 2308 by Ciaran Whitaker RN  Body Position:   turned   left  Taken 10/22/2023 2100 by Ciaran Whitaker RN  Body Position:   turned   right  Taken 10/22/2023 1954 by Ciaran Whitaker RN  Body Position:   side-lying   left  Range of Motion: ROM (range of motion) performed  Taken 10/22/2023 1900 by Ciaran Whitaker RN  Body Position:   turned   left     Problem: Communication Impairment (Mechanical Ventilation, Invasive)  Goal: Effective Communication  Outcome: Ongoing, Progressing     Problem: Device-Related Complication Risk (Mechanical Ventilation, Invasive)  Goal: Optimal Device Function  Outcome: Ongoing,  Progressing  Intervention: Optimize Device Care and Function  Recent Flowsheet Documentation  Taken 10/23/2023 0000 by Ciaran Whitaker RN  Aspiration Precautions:   oral hygiene care promoted   respiratory status monitored   tube feeding placement verified   upright posture maintained  Taken 10/22/2023 1954 by Ciaran Whitaker RN  Aspiration Precautions:   upright posture maintained   tube feeding placement verified   respiratory status monitored   oral hygiene care promoted  Airway Safety Measures:   manual resuscitator/mask at bedside   oxygen flowmeter at bedside   suction at bedside     Problem: Inability to Wean (Mechanical Ventilation, Invasive)  Goal: Mechanical Ventilation Liberation  Outcome: Ongoing, Progressing  Intervention: Promote Extubation and Mechanical Ventilation Liberation  Recent Flowsheet Documentation  Taken 10/22/2023 1954 by Ciaran Whitaker RN  Medication Review/Management: medications reviewed     Problem: Nutrition Impairment (Mechanical Ventilation, Invasive)  Goal: Optimal Nutrition Delivery  Outcome: Ongoing, Progressing     Problem: Skin and Tissue Injury (Mechanical Ventilation, Invasive)  Goal: Absence of Device-Related Skin and Tissue Injury  Outcome: Ongoing, Progressing  Intervention: Maintain Skin and Tissue Health  Recent Flowsheet Documentation  Taken 10/22/2023 1954 by Ciaran Whitaker RN  Device Skin Pressure Protection: skin-to-device areas padded

## 2023-10-23 NOTE — PROGRESS NOTES
Infectious Diseases Progress Note      LOS: 11 days   Patient Care Team:  Asia Queen APRN as PCP - General (Nurse Practitioner)  Asia Queen APRN as Nurse Practitioner (Nurse Practitioner)  Ling Bolden MD as Consulting Physician (Hematology and Oncology)    Chief Complaint: Intubated on the ventilator    Subjective     The patient had low-grade temperature and highest temperature was 100.1.  Was described that patient had increased respiratory secretions.  Remained intubated on the ventilator on 40% FiO2.  Currently on no vasopressors    Review of Systems:   Review of Systems   Unable to perform ROS: Intubated        Objective     Vital Signs  Temp:  [98.1 °F (36.7 °C)-100.1 °F (37.8 °C)] 99.9 °F (37.7 °C)  Heart Rate:  [] 103  Resp:  [22-42] 31  BP: (195)/(71-74) 195/71  FiO2 (%):  [40 %-50 %] 40 %    Physical Exam:  Physical Exam  Constitutional:       Comments: Intubated and sedated on the ventilator   HENT:      Head: Normocephalic and atraumatic.   Eyes:      Pupils: Pupils are equal, round, and reactive to light.   Cardiovascular:      Rate and Rhythm: Normal rate and regular rhythm.   Pulmonary:      Breath sounds: Rales present.   Abdominal:      General: Abdomen is flat. Bowel sounds are normal.      Palpations: Abdomen is soft.      Comments: NG tube   Genitourinary:     Comments: Bellamy catheter  Musculoskeletal:      Cervical back: Neck supple.      Right lower leg: No edema.      Left lower leg: No edema.   Neurological:      Comments: Sedated          Results Review:    I have reviewed all clinical data, test, lab, and imaging results.     Radiology  XR Chest 1 View    Result Date: 10/23/2023  XR CHEST 1 VW Date of Exam: 10/23/2023 3:58 AM EDT Indication: Hypoxia Comparison: 10/22/2023 and prior Findings: Study limited by overlying support and monitoring apparatus. Left upper extremity PICC line projects over the right side of the mediastinum at the expected position of  the SVC. Endotracheal tube projects in the trachea. NG tube extends below the diaphragm into the stomach. The heart and mediastinal contours are grossly stable. There is elevation of the right hemidiaphragm. Mild pulmonary vascular congestion with vascular crowding and dependent opacities at the lung bases without grade change from prior studies given differences in technique. Osseous structures are unremarkable     Impression: 1. Lines and tubes appear stable 2. Mild vascular crowding and dependent opacities compatible with atelectasis at the lung bases. No great change from comparison given differences in technique Electronically Signed: Fritz Valdivia MD  10/23/2023 7:43 AM EDT  Workstation ID: [id number]     Cardiology    Laboratory    Results from last 7 days   Lab Units 10/23/23  0421 10/22/23  0455 10/21/23  0455 10/20/23  0449 10/19/23  0413 10/18/23  0453 10/17/23  0424   WBC 10*3/mm3 22.80* 18.40* 46.90* 41.20* 34.20* 30.20* 14.40*   HEMOGLOBIN g/dL 8.8* 8.6* 9.6* 9.7* 9.4* 8.9* 10.3*   HEMATOCRIT % 28.1* 27.9* 31.8* 31.3* 30.4* 28.3* 33.8*   PLATELETS 10*3/mm3 226 200 269 220 195 166 122*     Results from last 7 days   Lab Units 10/23/23  0421 10/22/23  0455 10/21/23  0455 10/20/23  0449 10/19/23  1204 10/19/23  0413 10/18/23  0453 10/17/23  0424   SODIUM mmol/L 147* 147* 143 141  --  140 138 143   POTASSIUM mmol/L 3.9 4.6 4.9 3.7 3.8 3.1* 3.6 3.7   CHLORIDE mmol/L 105 107 105 103  --  102 99 103   CO2 mmol/L 27.0 26.0 24.0 24.0  --  23.0 24.0 24.0   BUN mg/dL 88* 73* 66* 57*  --  62* 61* 65*   CREATININE mg/dL 1.71* 2.18* 2.32* 2.14*  --  1.96* 2.08* 1.54*   GLUCOSE mg/dL 219* 257* 232* 253*  --  127* 147* 183*   ALBUMIN g/dL 3.4* 3.3* 3.0* 2.9*  --  2.8* 3.0* 3.3*   BILIRUBIN mg/dL 0.2 0.2 0.2 0.2  --  0.2 0.2 0.3   ALK PHOS U/L 163* 155* 182* 203*  --  218* 97 110   AST (SGOT) U/L 20 19 21 44*  --  106* 23 11   ALT (SGPT) U/L 22 24 30 49*  --  63* 11 9   CALCIUM mg/dL 9.9 9.5 9.0 8.8  --  8.6 8.6 9.3                  Microbiology   Microbiology Results (last 10 days)       Procedure Component Value - Date/Time    MRSA Screen, PCR (Inpatient) - Swab, Nares [[id number]]  (Normal) Collected: 10/18/23 1546    Lab Status: Final result Specimen: Swab from Nares Updated: 10/18/23 1727     MRSA PCR No MRSA Detected    Narrative:      The negative predictive value of this diagnostic test is high and should only be used to consider de-escalating anti-MRSA therapy. A positive result may indicate colonization with MRSA and must be correlated clinically.    Blood Culture - Blood, Hand, Right [[id number]]  (Normal) Collected: 10/18/23 1440    Lab Status: Preliminary result Specimen: Blood from Hand, Right Updated: 10/22/23 1501     Blood Culture No growth at 4 days    Narrative:      Less than seven (7) mL's of blood was collected.  Insufficient quantity may yield false negative results.    Fungus Culture - Wash, Bronchus [[id number]]  (Abnormal) Collected: 10/18/23 1440    Lab Status: Preliminary result Specimen: Wash from Bronchus Updated: 10/23/23 1050     Fungus Culture Candida species    AFB Culture - Wash, Bronchus [[id number]] Collected: 10/18/23 1440    Lab Status: Preliminary result Specimen: Wash from Bronchus Updated: 10/19/23 1047     AFB Stain No acid fast bacilli seen on concentrated smear    Respiratory Culture - Wash, Bronchus [[id number]] Collected: 10/18/23 1440    Lab Status: Final result Specimen: Wash from Bronchus Updated: 10/20/23 0910     Respiratory Culture Light growth (2+) Normal respiratory franci. No S. aureus or Pseudomonas aeruginosa detected. Final report.     Gram Stain Many (4+) WBCs seen      Rare (1+) Gram positive cocci in clusters    Blood Culture - Blood, Hand, Right [[id number]]  (Normal) Collected: 10/18/23 1437    Lab Status: Preliminary result Specimen: Blood from Hand, Right Updated: 10/22/23 1501     Blood Culture No growth at 4 days    Narrative:      Less than seven (7) mL's of blood was  collected.  Insufficient quantity may yield false negative results.    Respiratory Culture - Sputum, ET Suction [[id number]] Collected: 10/15/23 1354    Lab Status: Final result Specimen: Sputum from ET Suction Updated: 10/18/23 1030     Respiratory Culture Scant growth (1+) Normal respiratory franci. No S. aureus or Pseudomonas aeruginosa detected. Final report.     Gram Stain Moderate (3+) WBCs per low power field      Few (2+) Gram positive cocci in clusters    Blood Culture - Blood, Hand, Left [[id number]]  (Normal) Collected: 10/14/23 1620    Lab Status: Final result Specimen: Blood from Hand, Left Updated: 10/19/23 1701     Blood Culture No growth at 5 days    Urine Culture - Urine, Straight Cath [[id number]]  (Abnormal)  (Susceptibility) Collected: 10/14/23 1342    Lab Status: Final result Specimen: Urine from Straight Cath Updated: 10/17/23 0224     Urine Culture >100,000 CFU/mL Klebsiella pneumoniae ssp pneumoniae      >100,000 CFU/mL Escherichia coli    Narrative:      Colonization of the urinary tract without infection is common. Treatment is discouraged unless the patient is symptomatic, pregnant, or undergoing an invasive urologic procedure.    Susceptibility        Klebsiella pneumoniae ssp pneumoniae      JUAN      Ampicillin Resistant      Ampicillin + Sulbactam Susceptible      Cefazolin Susceptible      Cefepime Susceptible      Ceftazidime Susceptible      Ceftriaxone Susceptible      Gentamicin Susceptible      Levofloxacin Susceptible      Nitrofurantoin Intermediate      Piperacillin + Tazobactam Susceptible      Trimethoprim + Sulfamethoxazole Susceptible                       Susceptibility        Escherichia coli      JUAN      Ampicillin Susceptible      Ampicillin + Sulbactam Susceptible      Cefazolin Susceptible      Cefepime Susceptible      Ceftazidime Susceptible      Ceftriaxone Susceptible      Gentamicin Susceptible      Levofloxacin Susceptible      Nitrofurantoin Susceptible       Piperacillin + Tazobactam Susceptible      Trimethoprim + Sulfamethoxazole Susceptible                                   Medication Review:       Schedule Meds  amLODIPine, 10 mg, Nasogastric, Q24H  [START ON 10/24/2023] bumetanide, 2 mg, Intravenous, Daily  enoxaparin, 30 mg, Subcutaneous, Daily  ferrous sulfate, 300 mg, Nasogastric, Once per day on Mon Wed Fri  [START ON 10/24/2023] insulin glargine, 20 Units, Subcutaneous, Daily  insulin lispro, 4-24 Units, Subcutaneous, Q6H  lactulose, 20 g, Nasogastric, Once  lansoprazole, 30 mg, Nasogastric, Q AM  levothyroxine, 100 mcg, Nasogastric, Q AM  piperacillin-tazobactam, 4.5 g, Intravenous, Q12H  polyethylene glycol, 17 g, Oral, Daily  rOPINIRole, 0.25 mg, Oral, Nightly  rosuvastatin, 10 mg, Nasogastric, Nightly  senna-docusate sodium, 2 tablet, Nasogastric, BID  sertraline, 150 mg, Nasogastric, Daily  sodium chloride, 10 mL, Intravenous, Q12H  sodium chloride, 10 mL, Intravenous, Q12H        Infusion Meds  dexmedetomidine, 0.2-1.5 mcg/kg/hr, Last Rate: 0.1 mcg/kg/hr (10/23/23 0530)  Pharmacy Consult - Pharmacy to dose,         PRN Meds    acetaminophen    cyclobenzaprine    dextrose    dextrose    dextrose    fentaNYL citrate (PF)    glucagon (human recombinant)    hydrALAZINE    ipratropium-albuterol    lidocaine    nitroglycerin    ondansetron    oxyCODONE    Pharmacy Consult - Pharmacy to dose    polyethylene glycol    [COMPLETED] Insert Peripheral IV **AND** sodium chloride    sodium chloride    sodium chloride    sodium chloride    sodium chloride        Assessment & Plan       Antimicrobial Therapy   1.  IV Zosyn        2.        3.        4.        5.            Assessment     Acute respiratory failure.  Most likely secondary to hospital-acquired pneumonia.  CT scan of the chest on October 18 showed bilateral lower lobe infiltrates.  There was no additional findings consistent with PCP pneumonia  The patient is currently on ventilator on 50% FiO2 and 8  PEEP  Patient is s/p bronchoscopy and BAL cultures are negative  MRSA screen was negative     Severe lymphocytosis.  Most likely secondary to underlying CLL.  Patient was not on treatment prior to admission     History of breast cancer s/p mastectomies in the past     UTI with Klebsiella pneumoniae and E. coli.  Organisms were relatively susceptible     Diabetes mellitus type 2     Rhinovirus infection.  Nasal swab was positive for rhinovirus PCR    Mild septic shock-currently off vasopressors     Plan     Continue Zosyn 4.5 g IV every 12 hours for a total of 7 days, last day will be October 25  Continue supportive care  A.m. labs  Okay to discontinue droplet isolation for rhinovirus  Case was discussed with the patient family at bedside  Request repeating 2 sets of blood culture  Repeat tracheal aspirate for culture        Damian Santos MD  10/23/23  12:24 EDT    Note is dictated utilizing voice recognition software/Dragon

## 2023-10-24 ENCOUNTER — APPOINTMENT (OUTPATIENT)
Dept: GENERAL RADIOLOGY | Facility: HOSPITAL | Age: 83
DRG: 207 | End: 2023-10-24
Payer: MEDICARE

## 2023-10-24 LAB
ALBUMIN SERPL-MCNC: 3.1 G/DL (ref 3.5–5.2)
ALBUMIN/GLOB SERPL: 1 G/DL
ALP SERPL-CCNC: 127 U/L (ref 39–117)
ALT SERPL W P-5'-P-CCNC: 24 U/L (ref 1–33)
ANION GAP SERPL CALCULATED.3IONS-SCNC: 13 MMOL/L (ref 5–15)
ARTERIAL PATENCY WRIST A: POSITIVE
AST SERPL-CCNC: 30 U/L (ref 1–32)
ATMOSPHERIC PRESS: ABNORMAL MM[HG]
BASE EXCESS BLDA CALC-SCNC: 3.8 MMOL/L (ref 0–3)
BDY SITE: ABNORMAL
BILIRUB SERPL-MCNC: 0.2 MG/DL (ref 0–1.2)
BUN SERPL-MCNC: 93 MG/DL (ref 8–23)
BUN/CREAT SERPL: 55.7 (ref 7–25)
CALCIUM SPEC-SCNC: 9.3 MG/DL (ref 8.6–10.5)
CHLORIDE SERPL-SCNC: 103 MMOL/L (ref 98–107)
CO2 BLDA-SCNC: 28.3 MMOL/L (ref 22–29)
CO2 SERPL-SCNC: 27 MMOL/L (ref 22–29)
CREAT SERPL-MCNC: 1.67 MG/DL (ref 0.57–1)
DEPRECATED RDW RBC AUTO: 46.4 FL (ref 37–54)
EGFRCR SERPLBLD CKD-EPI 2021: 30.5 ML/MIN/1.73
ELLIPTOCYTES BLD QL SMEAR: ABNORMAL
EOSINOPHIL # BLD MANUAL: 0.2 10*3/MM3 (ref 0–0.4)
EOSINOPHIL NFR BLD MANUAL: 1 % (ref 0.3–6.2)
ERYTHROCYTE [DISTWIDTH] IN BLOOD BY AUTOMATED COUNT: 15.2 % (ref 12.3–15.4)
GLOBULIN UR ELPH-MCNC: 3.2 GM/DL
GLUCOSE BLDC GLUCOMTR-MCNC: 150 MG/DL (ref 70–105)
GLUCOSE BLDC GLUCOMTR-MCNC: 189 MG/DL (ref 70–105)
GLUCOSE BLDC GLUCOMTR-MCNC: 192 MG/DL (ref 70–105)
GLUCOSE BLDC GLUCOMTR-MCNC: 214 MG/DL (ref 70–105)
GLUCOSE BLDC GLUCOMTR-MCNC: 220 MG/DL (ref 70–105)
GLUCOSE SERPL-MCNC: 244 MG/DL (ref 65–99)
HCO3 BLDA-SCNC: 27.2 MMOL/L (ref 21–28)
HCT VFR BLD AUTO: 25.9 % (ref 34–46.6)
HEMODILUTION: NO
HGB BLD-MCNC: 8 G/DL (ref 12–15.9)
INHALED O2 CONCENTRATION: 70 %
LYMPHOCYTES # BLD MANUAL: 11.37 10*3/MM3 (ref 0.7–3.1)
LYMPHOCYTES NFR BLD MANUAL: 2 % (ref 5–12)
MAGNESIUM SERPL-MCNC: 1.9 MG/DL (ref 1.6–2.4)
MCH RBC QN AUTO: 27.1 PG (ref 26.6–33)
MCHC RBC AUTO-ENTMCNC: 30.9 G/DL (ref 31.5–35.7)
MCV RBC AUTO: 87.7 FL (ref 79–97)
METAMYELOCYTES NFR BLD MANUAL: 4 % (ref 0–0)
MODALITY: ABNORMAL
MONOCYTES # BLD: 0.41 10*3/MM3 (ref 0.1–0.9)
NEUTROPHILS # BLD AUTO: 7.51 10*3/MM3 (ref 1.7–7)
NEUTROPHILS NFR BLD MANUAL: 31 % (ref 42.7–76)
NEUTS BAND NFR BLD MANUAL: 6 % (ref 0–5)
PCO2 BLDA: 35.5 MM HG (ref 35–48)
PEEP RESPIRATORY: 5 CM[H2O]
PH BLDA: 7.49 PH UNITS (ref 7.35–7.45)
PHOSPHATE SERPL-MCNC: 2.8 MG/DL (ref 2.5–4.5)
PLAT MORPH BLD: NORMAL
PLATELET # BLD AUTO: 203 10*3/MM3 (ref 140–450)
PMV BLD AUTO: 9.4 FL (ref 6–12)
PO2 BLDA: 72.2 MM HG (ref 83–108)
POTASSIUM SERPL-SCNC: 4 MMOL/L (ref 3.5–5.2)
PROT SERPL-MCNC: 6.3 G/DL (ref 6–8.5)
RBC # BLD AUTO: 2.95 10*6/MM3 (ref 3.77–5.28)
RESPIRATORY RATE: 22
SAO2 % BLDCOA: 95.6 % (ref 94–98)
SCAN SLIDE: NORMAL
SMUDGE CELLS BLD QL SMEAR: ABNORMAL
SODIUM SERPL-SCNC: 143 MMOL/L (ref 136–145)
SPHEROCYTES BLD QL SMEAR: ABNORMAL
VARIANT LYMPHS NFR BLD MANUAL: 56 % (ref 19.6–45.3)
VENTILATOR MODE: ABNORMAL
VT ON VENT VENT: 450 ML
WBC NRBC COR # BLD: 20.3 10*3/MM3 (ref 3.4–10.8)

## 2023-10-24 PROCEDURE — 71045 X-RAY EXAM CHEST 1 VIEW: CPT

## 2023-10-24 PROCEDURE — 63710000001 INSULIN GLARGINE PER 5 UNITS: Performed by: INTERNAL MEDICINE

## 2023-10-24 PROCEDURE — 25010000002 ENOXAPARIN PER 10 MG: Performed by: INTERNAL MEDICINE

## 2023-10-24 PROCEDURE — 25010000002 PIPERACILLIN SOD-TAZOBACTAM PER 1 G: Performed by: INTERNAL MEDICINE

## 2023-10-24 PROCEDURE — 84100 ASSAY OF PHOSPHORUS: CPT

## 2023-10-24 PROCEDURE — 82803 BLOOD GASES ANY COMBINATION: CPT

## 2023-10-24 PROCEDURE — 25010000002 FENTANYL CITRATE (PF) 50 MCG/ML SOLUTION: Performed by: INTERNAL MEDICINE

## 2023-10-24 PROCEDURE — 85025 COMPLETE CBC W/AUTO DIFF WBC: CPT | Performed by: NURSE PRACTITIONER

## 2023-10-24 PROCEDURE — 94761 N-INVAS EAR/PLS OXIMETRY MLT: CPT

## 2023-10-24 PROCEDURE — 94799 UNLISTED PULMONARY SVC/PX: CPT

## 2023-10-24 PROCEDURE — 83735 ASSAY OF MAGNESIUM: CPT | Performed by: STUDENT IN AN ORGANIZED HEALTH CARE EDUCATION/TRAINING PROGRAM

## 2023-10-24 PROCEDURE — 82948 REAGENT STRIP/BLOOD GLUCOSE: CPT

## 2023-10-24 PROCEDURE — 85007 BL SMEAR W/DIFF WBC COUNT: CPT | Performed by: NURSE PRACTITIONER

## 2023-10-24 PROCEDURE — 63710000001 INSULIN LISPRO (HUMAN) PER 5 UNITS: Performed by: INTERNAL MEDICINE

## 2023-10-24 PROCEDURE — 80053 COMPREHEN METABOLIC PANEL: CPT | Performed by: STUDENT IN AN ORGANIZED HEALTH CARE EDUCATION/TRAINING PROGRAM

## 2023-10-24 PROCEDURE — 25010000002 MEROPENEM PER 100 MG: Performed by: INTERNAL MEDICINE

## 2023-10-24 PROCEDURE — 36600 WITHDRAWAL OF ARTERIAL BLOOD: CPT

## 2023-10-24 PROCEDURE — 94003 VENT MGMT INPAT SUBQ DAY: CPT

## 2023-10-24 RX ADMIN — INSULIN LISPRO 7 UNITS: 100 INJECTION, SOLUTION INTRAVENOUS; SUBCUTANEOUS at 00:10

## 2023-10-24 RX ADMIN — Medication 10 ML: at 21:03

## 2023-10-24 RX ADMIN — ROSUVASTATIN 10 MG: 10 TABLET, FILM COATED ORAL at 21:03

## 2023-10-24 RX ADMIN — INSULIN LISPRO 4 UNITS: 100 INJECTION, SOLUTION INTRAVENOUS; SUBCUTANEOUS at 11:39

## 2023-10-24 RX ADMIN — ROPINIROLE HYDROCHLORIDE 0.25 MG: 0.25 TABLET, FILM COATED ORAL at 21:03

## 2023-10-24 RX ADMIN — DEXMEDETOMIDINE HYDROCHLORIDE 1 MCG/KG/HR: 4 INJECTION, SOLUTION INTRAVENOUS at 17:53

## 2023-10-24 RX ADMIN — LANSOPRAZOLE 30 MG: 30 TABLET, ORALLY DISINTEGRATING ORAL at 06:21

## 2023-10-24 RX ADMIN — OXYCODONE HYDROCHLORIDE 5 MG: 5 TABLET ORAL at 21:03

## 2023-10-24 RX ADMIN — LEVOTHYROXINE SODIUM 100 MCG: 0.1 TABLET ORAL at 06:21

## 2023-10-24 RX ADMIN — CYCLOBENZAPRINE 10 MG: 10 TABLET, FILM COATED ORAL at 21:03

## 2023-10-24 RX ADMIN — SENNOSIDES AND DOCUSATE SODIUM 2 TABLET: 8.6; 5 TABLET ORAL at 09:06

## 2023-10-24 RX ADMIN — INSULIN LISPRO 8 UNITS: 100 INJECTION, SOLUTION INTRAVENOUS; SUBCUTANEOUS at 23:21

## 2023-10-24 RX ADMIN — AMLODIPINE BESYLATE 10 MG: 5 TABLET ORAL at 09:06

## 2023-10-24 RX ADMIN — INSULIN LISPRO 4 UNITS: 100 INJECTION, SOLUTION INTRAVENOUS; SUBCUTANEOUS at 17:43

## 2023-10-24 RX ADMIN — DEXMEDETOMIDINE HYDROCHLORIDE 1 MCG/KG/HR: 4 INJECTION, SOLUTION INTRAVENOUS at 08:39

## 2023-10-24 RX ADMIN — INSULIN GLARGINE 20 UNITS: 100 INJECTION, SOLUTION SUBCUTANEOUS at 09:07

## 2023-10-24 RX ADMIN — MEROPENEM 1000 MG: 1 INJECTION, POWDER, FOR SOLUTION INTRAVENOUS at 17:43

## 2023-10-24 RX ADMIN — BUMETANIDE 2 MG: 0.25 INJECTION INTRAMUSCULAR; INTRAVENOUS at 09:06

## 2023-10-24 RX ADMIN — DEXMEDETOMIDINE HYDROCHLORIDE 1 MCG/KG/HR: 4 INJECTION, SOLUTION INTRAVENOUS at 21:46

## 2023-10-24 RX ADMIN — INSULIN LISPRO 8 UNITS: 100 INJECTION, SOLUTION INTRAVENOUS; SUBCUTANEOUS at 06:21

## 2023-10-24 RX ADMIN — MEROPENEM 1000 MG: 1 INJECTION, POWDER, FOR SOLUTION INTRAVENOUS at 11:40

## 2023-10-24 RX ADMIN — ENOXAPARIN SODIUM 30 MG: 100 INJECTION SUBCUTANEOUS at 15:42

## 2023-10-24 RX ADMIN — PIPERACILLIN AND TAZOBACTAM 4.5 G: 4; .5 INJECTION, POWDER, FOR SOLUTION INTRAVENOUS at 00:11

## 2023-10-24 RX ADMIN — SERTRALINE 150 MG: 100 TABLET, FILM COATED ORAL at 09:06

## 2023-10-24 RX ADMIN — DEXMEDETOMIDINE HYDROCHLORIDE 1 MCG/KG/HR: 4 INJECTION, SOLUTION INTRAVENOUS at 12:52

## 2023-10-24 RX ADMIN — Medication 10 ML: at 09:07

## 2023-10-24 RX ADMIN — FENTANYL CITRATE 25 MCG: 50 INJECTION, SOLUTION INTRAMUSCULAR; INTRAVENOUS at 02:16

## 2023-10-24 RX ADMIN — DEXMEDETOMIDINE HYDROCHLORIDE 1 MCG/KG/HR: 4 INJECTION, SOLUTION INTRAVENOUS at 03:06

## 2023-10-24 RX ADMIN — POLYETHYLENE GLYCOL 3350 17 G: 17 POWDER, FOR SOLUTION ORAL at 09:06

## 2023-10-24 NOTE — PROGRESS NOTES
"RENAL/KCC:     LOS: 12 days    Patient Care Team:  Asia Queen APRN as PCP - General (Nurse Practitioner)  Asia Queen APRN as Nurse Practitioner (Nurse Practitioner)  Ling Bolden MD as Consulting Physician (Hematology and Oncology)    Chief Complaint:  Acute resp failure    Subjective     Interval History:   Chart reviewed  Remains on the vent - 60% FiO2 this AM  1.8L UOP but remains net positive  Discussed with nurse at bedside    Objective     Vital Sign Min/Max for last 24 hours  Temp  Min: 98.4 °F (36.9 °C)  Max: 100 °F (37.8 °C)   BP  Min: 113/50  Max: 196/81   Pulse  Min: 60  Max: 103   Resp  Min: 24  Max: 31   SpO2  Min: 91 %  Max: 95 %   No data recorded   Weight  Min: 77.5 kg (170 lb 13.7 oz)  Max: 77.5 kg (170 lb 13.7 oz)     Flowsheet Rows      Flowsheet Row First Filed Value   Admission Height 162.6 cm (64\") Documented at 10/10/2023 1252   Admission Weight 78.5 kg (173 lb) Documented at 10/10/2023 1252            No intake/output data recorded.  I/O last 3 completed shifts:  In: 5600 [I.V.:752; Other:2182; NG/GT:2666]  Out: 2480 [Urine:2480]    Physical Exam:  GEN: Intubated, sedated  ENT: +ETT  NECK: Supple, no JVD  CHEST: Lungs are clear  CV: RRR, no M/G/R, no peripheral edema  ABD: Soft, NT, +BS  SKIN: Warm and Dry  NEURO: Sedated      WBC WBC   Date Value Ref Range Status   10/24/2023 20.30 (H) 3.40 - 10.80 10*3/mm3 Final   10/23/2023 22.80 (H) 3.40 - 10.80 10*3/mm3 Final   10/22/2023 18.40 (H) 3.40 - 10.80 10*3/mm3 Final      HGB Hemoglobin   Date Value Ref Range Status   10/24/2023 8.0 (L) 12.0 - 15.9 g/dL Final   10/23/2023 8.8 (L) 12.0 - 15.9 g/dL Final   10/22/2023 8.6 (L) 12.0 - 15.9 g/dL Final      HCT Hematocrit   Date Value Ref Range Status   10/24/2023 25.9 (L) 34.0 - 46.6 % Final   10/23/2023 28.1 (L) 34.0 - 46.6 % Final   10/22/2023 27.9 (L) 34.0 - 46.6 % Final      Platlets No results found for: \"LABPLAT\"   MCV MCV   Date Value Ref Range Status   10/24/2023 87.7 " "79.0 - 97.0 fL Final   10/23/2023 88.6 79.0 - 97.0 fL Final   10/22/2023 87.7 79.0 - 97.0 fL Final          Sodium Sodium   Date Value Ref Range Status   10/24/2023 143 136 - 145 mmol/L Final   10/23/2023 147 (H) 136 - 145 mmol/L Final   10/22/2023 147 (H) 136 - 145 mmol/L Final      Potassium Potassium   Date Value Ref Range Status   10/24/2023 4.0 3.5 - 5.2 mmol/L Final   10/23/2023 3.9 3.5 - 5.2 mmol/L Final   10/22/2023 4.6 3.5 - 5.2 mmol/L Final      Chloride Chloride   Date Value Ref Range Status   10/24/2023 103 98 - 107 mmol/L Final   10/23/2023 105 98 - 107 mmol/L Final   10/22/2023 107 98 - 107 mmol/L Final      CO2 CO2   Date Value Ref Range Status   10/24/2023 27.0 22.0 - 29.0 mmol/L Final   10/23/2023 27.0 22.0 - 29.0 mmol/L Final   10/22/2023 26.0 22.0 - 29.0 mmol/L Final      BUN BUN   Date Value Ref Range Status   10/24/2023 93 (H) 8 - 23 mg/dL Final   10/23/2023 88 (H) 8 - 23 mg/dL Final   10/22/2023 73 (H) 8 - 23 mg/dL Final      Creatinine Creatinine   Date Value Ref Range Status   10/24/2023 1.67 (H) 0.57 - 1.00 mg/dL Final   10/23/2023 1.71 (H) 0.57 - 1.00 mg/dL Final   10/22/2023 2.18 (H) 0.57 - 1.00 mg/dL Final      Calcium Calcium   Date Value Ref Range Status   10/24/2023 9.3 8.6 - 10.5 mg/dL Final   10/23/2023 9.9 8.6 - 10.5 mg/dL Final   10/22/2023 9.5 8.6 - 10.5 mg/dL Final      PO4 No results found for: \"CAPO4\"   Albumin Albumin   Date Value Ref Range Status   10/24/2023 3.1 (L) 3.5 - 5.2 g/dL Final   10/23/2023 3.4 (L) 3.5 - 5.2 g/dL Final   10/22/2023 3.3 (L) 3.5 - 5.2 g/dL Final      Magnesium Magnesium   Date Value Ref Range Status   10/24/2023 1.9 1.6 - 2.4 mg/dL Final   10/23/2023 1.9 1.6 - 2.4 mg/dL Final   10/22/2023 1.8 1.6 - 2.4 mg/dL Final      Uric Acid No results found for: \"URICACID\"        Results Review:     I reviewed the patient's new clinical results.    amLODIPine, 10 mg, Nasogastric, Q24H  bumetanide, 2 mg, Intravenous, Daily  enoxaparin, 30 mg, Subcutaneous, " Daily  ferrous sulfate, 300 mg, Nasogastric, Once per day on Mon Wed Fri  insulin glargine, 20 Units, Subcutaneous, Daily  insulin lispro, 4-24 Units, Subcutaneous, Q6H  lactulose, 20 g, Nasogastric, Once  lansoprazole, 30 mg, Nasogastric, Q AM  levothyroxine, 100 mcg, Nasogastric, Q AM  piperacillin-tazobactam, 4.5 g, Intravenous, Q12H  polyethylene glycol, 17 g, Oral, Daily  rOPINIRole, 0.25 mg, Oral, Nightly  rosuvastatin, 10 mg, Nasogastric, Nightly  senna-docusate sodium, 2 tablet, Nasogastric, BID  sertraline, 150 mg, Nasogastric, Daily  sodium chloride, 10 mL, Intravenous, Q12H  sodium chloride, 10 mL, Intravenous, Q12H      dexmedetomidine, 0.2-1.5 mcg/kg/hr, Last Rate: 1 mcg/kg/hr (10/24/23 0306)        Medication Review: Reviewed    Assessment & Plan     LEXUS/CKD3  Sepsis  Fluid overload  Hypokalemia - from diuresis  Hypocalcemia  Pulmonary edema  Hypotension    Acute hypoxemic respiratory failure    Mixed anxiety and depressive disorder    Primary osteoarthritis involving multiple joints    Hiatal hernia with gastroesophageal reflux    Mixed hyperlipidemia    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    Acquired hypothyroidism    Type 2 diabetes mellitus with stage 3b chronic kidney disease, without long-term current use of insulin    Insomnia    Overactive bladder    Vitamin B 12 deficiency    Vitamin D deficiency    LIBBY (obstructive sleep apnea)    COPD (chronic obstructive pulmonary disease)    CKD (chronic kidney disease) stage 3, GFR 30-59 ml/min    History of breast cancer    Personal history of CLL (chronic lymphocytic leukemia)    History of cigarette smoking    Fall at home    Rhinovirus infection    Acute respiratory failure    Hyperkalemia    Respiratory failure    Pleural effusion    Plan: Cr improved to 1.6 but azotemia slightly worse.  Continue to daily Bumex.  Continue free water with TF's.  FC in place for urinary retention.  Vent per Pulm.  Will follow.       Frederick Rose  MD [place]  [facility]  10/24/23  08:05 EDT

## 2023-10-24 NOTE — PROGRESS NOTES
Critical Care Progress Note   Diane Guan : 1940 MRN:[id number] LOS:12     Principal Problem: Acute hypoxemic respiratory failure     Reason for follow up: All the medical problems listed below    Summary     A 83 y.o. female with PMH of diabetes, hypertension, breast cancer presented to the hospital after a fall and was admitted with a principal diagnosis of Acute hypoxemic respiratory failure.  On admission, patient required 2 L nasal cannula and subsequently had worsening oxygenation with hypercapnic respiratory failure.  Failed BiPAP and subsequently was intubated on 10/15.  Initially treated with broad-spectrum antibiotics and ID was consulted.  RVP positive for rhinovirus, sputum cultures were negative.  Treated with diuretics for fluid overload.  Developed profound hypoxia and needed deep sedation.  Had bronch on 10/18.  Oxygenation improved with aggressive diuretics.      Also had LEXUS on admission, nephrology was consulted.  Renal ultrasound with no obstruction.  Treated with diuretics.  Subsequently, urine cultures grew both E. coli and Klebsiella pneumonia.  Treated with Zosyn for 7 days.  During the hospital course, patient also developed elevated liver enzymes.  Gallbladder ultrasound showed dilated common bile duct measuring 14 mm with distended gallbladder and sludge.  GI was consulted and recommended conservative management.  Liver enzymes subsequently normalized.    Significant events     10/24/23 : Decrease FiO2 and PEE as tolerated,, minimize sedation.  Increase Lantus.    Assessment / Plan     Acute respiratory failure with hypoxia and hypercapnia / ARDS:   Due to fluid overload with pulmonary edema.  Possible contribution from rhinovirus infection with resultant ARDS.  Ventilator settings noted and adjusted as needed.  Some increasing PEEP and FiO2 requirements.  Mentation precluding any spontaneous breathing trials at this time.  Minimize sedation/analgesia to keep RASS of 0 to  -1.     Septic shock due to UTI / E. coli and Klebsiella bacteremia  RVP: +Rhinovirus  BAL cultures were negative.  ID following, recommended Zosyn for a total of 7 days.  Volume status optimal,  off IV vasopressors.     Acute on chronic heart failure with preserved EF:   Improved volume status.  Last ECHO showed an EF of 70% with indeterminate LV diastolic function.   Currently on IV Bumex, nephrology managing the diuretic regimen.  Monitor Input/Output very closely. Follow daily weights.   Net IO Since Admission: 4,817.95 mL [10/24/23 1010]    Transaminitis with dilated common bile duct  Gallbladder ultrasound with dilated CBD, measuring 14 mm with gall sludge.  GI following.  Liver enzymes normalized.     Acute toxic / metabolic encephalopathy  CT head on 10/14 with no acute pathology.  Likely due to residual effects of deep sedation for multiple days.  Slowly improving mentation.     Acute kidney injury on CKD stage III  Remains non oliguric.  Baseline creatinine of 1.3.  Likely ATN from septic shock.  Nephrology following, continue with diuresis.  Has indwelling Bellamy catheter due to acute urinary retention.  Monitor Input/Output very closely.   Net IO Since Admission: 4,817.95 mL [10/24/23 1010]     Diabetes mellitus Type 2, not insulin-dependent : well controlled.   Hold home metformin.  Sugar slightly elevated, increase Lantus to 30 units daily.  Accu checks every 6 hours and c/w humalog coverage as needed.   Last A1c of 6.5.    Essential Hypertension: well controlled.   Continue with Norvasc.  Hold home chlorthalidone, Cozaar and irbesartan.  Restart medications as needed.    Constipation: On scheduled laxatives.     Primary hypothyroidism: Chronic and stable. Continue synthroid.  COPD: Chronic and stable.  Not on any home medications.  Bronchodilators as needed.  Iron deficiency anemia: On ferrous sulfate.  Dyslipidemia: Chronic and stable.  Continue with statins.  Breast cancer, s/p right mastectomy:  Resume home anastrozole when able to swallow pills.  Chronic lymphocytic leukemia  Falls at home/functional decline  Restless leg syndrome: Continue with Requip.  Anxiety/depression: Continue Zoloft  Severe obstructive sleep apnea: API of 34.5.  Not sure if she uses any home CPAP.  Refused AVAPS in the past.    Code status:   Level Of Support Discussed With: Patient  Code Status (Patient has no pulse and is not breathing): CPR (Attempt to Resuscitate)  Medical Interventions (Patient has pulse or is breathing): Full Support       Nutrition: NPO Diet NPO Type: Strict NPO   Diet, Tube Feeding Tube Feeding Formula: Peptamen AF (Vital AF 1.2); Tube Feeding Type: Continuous; Continuous Tube Feeding Start Rate (mL/hr): 60; Then Advance Rate By (mL/hr): Do Not Advance; Every __ Hours: Patient at Goal Rate; To Goal Rate of...    DVT prophylaxis:  Medical and mechanical DVT prophylaxis orders are present.     Subjective / Review of systems     Review of Systems   Intubated and somnolent.  Spoke with daughter.  She said that patient opens eyes at times.  Objective / Physical Exam   Vital signs:  Temp: 98.8 °F (37.1 °C)  BP: 148/59  Heart Rate: 66  Resp: 25  SpO2: 95 %  Weight: 77.5 kg (170 lb 13.7 oz)    Admission Weight: Weight: 78.5 kg (173 lb)  Current Weight: Weight: 77.5 kg (170 lb 13.7 oz)    Input/Output in last 24 hours:    Intake/Output Summary (Last 24 hours) at 10/24/2023 1010  Last data filed at 10/24/2023 0903  Gross per 24 hour   Intake 3246 ml   Output 1900 ml   Net 1346 ml      Physical Exam  Vitals and nursing note reviewed.   Constitutional:       Interventions: She is sedated and intubated.   HENT:      Head: Normocephalic and atraumatic.      Mouth/Throat:      Mouth: Mucous membranes are moist.      Comments: ET tube in place  Eyes:      General: No scleral icterus.     Conjunctiva/sclera: Conjunctivae normal.   Cardiovascular:      Rate and Rhythm: Normal rate.      Pulses: Normal pulses.      Heart  sounds: No murmur heard.     No gallop.   Pulmonary:      Effort: Pulmonary effort is normal. She is intubated.      Breath sounds: Normal breath sounds. No wheezing or rales.   Abdominal:      General: Bowel sounds are normal.      Palpations: Abdomen is soft.      Tenderness: There is no abdominal tenderness.   Genitourinary:     Comments: Bellamy catheter  Musculoskeletal:      Right lower leg: No edema.      Left lower leg: No edema.   Skin:     Comments: Left arm PICC line in place   Neurological:      Comments: Withdraws to stimuli        Radiology and Labs     Results from last 7 days   Lab Units 10/24/23  0454 10/23/23  0421 10/22/23  0455 10/21/23  0455 10/20/23  0449   WBC 10*3/mm3 20.30* 22.80* 18.40* 46.90* 41.20*   HEMATOCRIT % 25.9* 28.1* 27.9* 31.8* 31.3*   PLATELETS 10*3/mm3 203 226 200 269 220      Results from last 7 days   Lab Units 10/24/23  0307 10/23/23  0421 10/22/23  0455 10/21/23  0455 10/20/23  0449   SODIUM mmol/L 143 147* 147* 143 141   POTASSIUM mmol/L 4.0 3.9 4.6 4.9 3.7   CHLORIDE mmol/L 103 105 107 105 103   CO2 mmol/L 27.0 27.0 26.0 24.0 24.0   BUN mg/dL 93* 88* 73* 66* 57*   CREATININE mg/dL 1.67* 1.71* 2.18* 2.32* 2.14*      Current medications   Scheduled Meds: amLODIPine, 10 mg, Nasogastric, Q24H  bumetanide, 2 mg, Intravenous, Daily  enoxaparin, 30 mg, Subcutaneous, Daily  ferrous sulfate, 300 mg, Nasogastric, Once per day on Mon Wed Fri  [START ON 10/25/2023] insulin glargine, 30 Units, Subcutaneous, Daily  insulin lispro, 4-24 Units, Subcutaneous, Q6H  lactulose, 20 g, Nasogastric, Once  lansoprazole, 30 mg, Nasogastric, Q AM  levothyroxine, 100 mcg, Nasogastric, Q AM  piperacillin-tazobactam, 4.5 g, Intravenous, Q12H  polyethylene glycol, 17 g, Oral, Daily  rOPINIRole, 0.25 mg, Oral, Nightly  rosuvastatin, 10 mg, Nasogastric, Nightly  senna-docusate sodium, 2 tablet, Nasogastric, BID  sertraline, 150 mg, Nasogastric, Daily  sodium chloride, 10 mL, Intravenous, Q12H  sodium  chloride, 10 mL, Intravenous, Q12H      Continuous Infusions: dexmedetomidine, 0.2-1.5 mcg/kg/hr, Last Rate: 1 mcg/kg/hr (10/24/23 1994)      Plan discussed with RN. Reviewed all other data in the last 24 hours, including but not limited to vitals, labs, microbiology, imaging and pertinent notes from other providers. High complexity decision making and high risk of deterioration. Updated daughter at the bedside.    Foreign Bolton MD   Critical Care  10/24/23   10:10 EDT

## 2023-10-24 NOTE — CASE MANAGEMENT/SOCIAL WORK
Continued Stay Note  [facility]     Patient Name: Diane Guan  MRN: [id number]  Today's Date: 10/24/2023    Admit Date: 10/10/2023    Plan: DC PLAN: Referral to [facility] pending. Requires CATHLEEN mills approved.(VENT)       Discharge Plan       Row Name 10/24/23 1337       Plan    Plan DC PLAN: Referral to [facility] pending. Requires lina, CATHLEEN approved.(VENT)    Plan Comments DC BARRIERS: Vent 60/8 Bumex gtt, Blood and sputum cultures pending.                  Expected Discharge Date and Time       Expected Discharge Date Expected Discharge Time    Oct 26, 2023           Brie Castorena RN

## 2023-10-24 NOTE — PLAN OF CARE
Goal Outcome Evaluation:      Pt remains NSR. Pt is on vent FiO2 60% and 8 of PEEP. Pt is not following commands but will open eyes. UOP- 625. Pt had 3 BM throughout shift. Pt highest residual 60 mL.     Precedex gtt on.

## 2023-10-24 NOTE — PLAN OF CARE
Goal Outcome Evaluation:  Plan of Care Reviewed With: sibling      Patient required higher O2 and peep tonite to maintain oxygenation  lungs clear but diminished  fair UOP tolerating tube feedings restless at times requiring medication of fentanyl  remains in NSR  restraints remain in place to prevent ETT or OGT being removed by patient.

## 2023-10-24 NOTE — PROGRESS NOTES
Infectious Diseases Progress Note      LOS: 12 days   Patient Care Team:  Asia Queen APRN as PCP - General (Nurse Practitioner)  Asia Queen APRN as Nurse Practitioner (Nurse Practitioner)  Ling Bolden MD as Consulting Physician (Hematology and Oncology)    Chief Complaint: Intubated on the ventilator    Subjective     The patient had no high-grade fever during the last 24 hours.  Highest pressure was 100.0.  Remained intubated but currently on 60% FiO2.  Continued to have respiratory secretions.  Currently off vasopressors    Review of Systems:   Review of Systems   Unable to perform ROS: Intubated        Objective     Vital Signs  Temp:  [98.1 °F (36.7 °C)-100 °F (37.8 °C)] 98.1 °F (36.7 °C)  Heart Rate:  [55-96] 64  Resp:  [24-27] 25  BP: (108-171)/(49-75) 133/59  FiO2 (%):  [40 %-60 %] 60 %    Physical Exam:  Physical Exam  Constitutional:       Comments: Intubated and sedated on the ventilator   HENT:      Head: Normocephalic and atraumatic.   Eyes:      Pupils: Pupils are equal, round, and reactive to light.   Cardiovascular:      Rate and Rhythm: Normal rate and regular rhythm.   Pulmonary:      Breath sounds: Rales present.   Abdominal:      General: Abdomen is flat. Bowel sounds are normal.      Palpations: Abdomen is soft.      Comments: NG tube   Genitourinary:     Comments: Bellamy catheter  Musculoskeletal:      Cervical back: Neck supple.      Right lower leg: No edema.      Left lower leg: No edema.   Neurological:      Comments: Sedated          Results Review:    I have reviewed all clinical data, test, lab, and imaging results.     Radiology  XR Chest 1 View    Result Date: 10/24/2023  XR CHEST 1 VW Date of Exam: 10/24/2023 1:52 AM EDT Indication: Hypoxia Comparison: October 23, 2023 Findings: There is an endotracheal tube in the midthoracic trachea. There is a nasogastric tube in the stomach. There is a left-sided peripherally inserted catheter with the tip just into the  superior vena cava. The heart is borderline enlarged. There is mild pulmonary vascular prominence. There are no focal infiltrates.     Impression: Tubes and lines appear properly positioned. There is cardiomegaly with mild pulmonary vascular congestion. Electronically Signed: David Pan MD  10/24/2023 2:10 AM EDT  Workstation ID: [id number]     Cardiology    Laboratory    Results from last 7 days   Lab Units 10/24/23  0454 10/23/23  0421 10/22/23  0455 10/21/23  0455 10/20/23  0449 10/19/23  0413 10/18/23  0453   WBC 10*3/mm3 20.30* 22.80* 18.40* 46.90* 41.20* 34.20* 30.20*   HEMOGLOBIN g/dL 8.0* 8.8* 8.6* 9.6* 9.7* 9.4* 8.9*   HEMATOCRIT % 25.9* 28.1* 27.9* 31.8* 31.3* 30.4* 28.3*   PLATELETS 10*3/mm3 203 226 200 269 220 195 166     Results from last 7 days   Lab Units 10/24/23  0307 10/23/23  0421 10/22/23  0455 10/21/23  0455 10/20/23  0449 10/19/23  1204 10/19/23  0413 10/18/23  0453   SODIUM mmol/L 143 147* 147* 143 141  --  140 138   POTASSIUM mmol/L 4.0 3.9 4.6 4.9 3.7 3.8 3.1* 3.6   CHLORIDE mmol/L 103 105 107 105 103  --  102 99   CO2 mmol/L 27.0 27.0 26.0 24.0 24.0  --  23.0 24.0   BUN mg/dL 93* 88* 73* 66* 57*  --  62* 61*   CREATININE mg/dL 1.67* 1.71* 2.18* 2.32* 2.14*  --  1.96* 2.08*   GLUCOSE mg/dL 244* 219* 257* 232* 253*  --  127* 147*   ALBUMIN g/dL 3.1* 3.4* 3.3* 3.0* 2.9*  --  2.8* 3.0*   BILIRUBIN mg/dL 0.2 0.2 0.2 0.2 0.2  --  0.2 0.2   ALK PHOS U/L 127* 163* 155* 182* 203*  --  218* 97   AST (SGOT) U/L 30 20 19 21 44*  --  106* 23   ALT (SGPT) U/L 24 22 24 30 49*  --  63* 11   CALCIUM mg/dL 9.3 9.9 9.5 9.0 8.8  --  8.6 8.6                 Microbiology   Microbiology Results (last 10 days)       Procedure Component Value - Date/Time    Respiratory Culture - Aspirate, ET Suction [[id number]]  (Abnormal) Collected: 10/23/23 1356    Lab Status: Preliminary result Specimen: Aspirate from ET Suction Updated: 10/24/23 1100     Respiratory Culture Heavy growth (4+) Gram Negative Bacilli      No Normal  Respiratory Franci     Gram Stain Less than 10 Epithelial cells per low power field      Moderate (3+) WBCs seen      Many (4+) Gram negative bacilli      Occasional Gram positive cocci in clusters      Rare (1+) Yeast    MRSA Screen, PCR (Inpatient) - Swab, Nares [[id number]]  (Normal) Collected: 10/18/23 1546    Lab Status: Final result Specimen: Swab from Nares Updated: 10/18/23 1727     MRSA PCR No MRSA Detected    Narrative:      The negative predictive value of this diagnostic test is high and should only be used to consider de-escalating anti-MRSA therapy. A positive result may indicate colonization with MRSA and must be correlated clinically.    Blood Culture - Blood, Hand, Right [[id number]]  (Normal) Collected: 10/18/23 1440    Lab Status: Final result Specimen: Blood from Hand, Right Updated: 10/23/23 1501     Blood Culture No growth at 5 days    Narrative:      Less than seven (7) mL's of blood was collected.  Insufficient quantity may yield false negative results.    Fungus Culture - Wash, Bronchus [[id number]]  (Abnormal) Collected: 10/18/23 1440    Lab Status: Preliminary result Specimen: Wash from Bronchus Updated: 10/23/23 1050     Fungus Culture Candida species    AFB Culture - Wash, Bronchus [[id number]] Collected: 10/18/23 1440    Lab Status: Preliminary result Specimen: Wash from Bronchus Updated: 10/23/23 1515     AFB Culture No AFB isolated at less than 1 week     AFB Stain No acid fast bacilli seen on concentrated smear    Respiratory Culture - Wash, Bronchus [[id number]] Collected: 10/18/23 1440    Lab Status: Final result Specimen: Wash from Bronchus Updated: 10/20/23 0910     Respiratory Culture Light growth (2+) Normal respiratory franci. No S. aureus or Pseudomonas aeruginosa detected. Final report.     Gram Stain Many (4+) WBCs seen      Rare (1+) Gram positive cocci in clusters    Blood Culture - Blood, Hand, Right [[id number]]  (Normal) Collected: 10/18/23 1437    Lab Status: Final result  Specimen: Blood from Hand, Right Updated: 10/23/23 1501     Blood Culture No growth at 5 days    Narrative:      Less than seven (7) mL's of blood was collected.  Insufficient quantity may yield false negative results.    Respiratory Culture - Sputum, ET Suction [[id number]] Collected: 10/15/23 1354    Lab Status: Final result Specimen: Sputum from ET Suction Updated: 10/18/23 1030     Respiratory Culture Scant growth (1+) Normal respiratory franci. No S. aureus or Pseudomonas aeruginosa detected. Final report.     Gram Stain Moderate (3+) WBCs per low power field      Few (2+) Gram positive cocci in clusters    Blood Culture - Blood, Hand, Left [[id number]]  (Normal) Collected: 10/14/23 1620    Lab Status: Final result Specimen: Blood from Hand, Left Updated: 10/19/23 1701     Blood Culture No growth at 5 days    Urine Culture - Urine, Straight Cath [[id number]]  (Abnormal)  (Susceptibility) Collected: 10/14/23 1342    Lab Status: Final result Specimen: Urine from Straight Cath Updated: 10/17/23 0224     Urine Culture >100,000 CFU/mL Klebsiella pneumoniae ssp pneumoniae      >100,000 CFU/mL Escherichia coli    Narrative:      Colonization of the urinary tract without infection is common. Treatment is discouraged unless the patient is symptomatic, pregnant, or undergoing an invasive urologic procedure.    Susceptibility        Klebsiella pneumoniae ssp pneumoniae      JUAN      Ampicillin Resistant      Ampicillin + Sulbactam Susceptible      Cefazolin Susceptible      Cefepime Susceptible      Ceftazidime Susceptible      Ceftriaxone Susceptible      Gentamicin Susceptible      Levofloxacin Susceptible      Nitrofurantoin Intermediate      Piperacillin + Tazobactam Susceptible      Trimethoprim + Sulfamethoxazole Susceptible                       Susceptibility        Escherichia coli      JUAN      Ampicillin Susceptible      Ampicillin + Sulbactam Susceptible      Cefazolin Susceptible      Cefepime Susceptible       Ceftazidime Susceptible      Ceftriaxone Susceptible      Gentamicin Susceptible      Levofloxacin Susceptible      Nitrofurantoin Susceptible      Piperacillin + Tazobactam Susceptible      Trimethoprim + Sulfamethoxazole Susceptible                                   Medication Review:       Schedule Meds  amLODIPine, 10 mg, Nasogastric, Q24H  bumetanide, 2 mg, Intravenous, Daily  enoxaparin, 30 mg, Subcutaneous, Daily  ferrous sulfate, 300 mg, Nasogastric, Once per day on Mon Wed Fri  [START ON 10/25/2023] insulin glargine, 30 Units, Subcutaneous, Daily  insulin lispro, 4-24 Units, Subcutaneous, Q6H  lactulose, 20 g, Nasogastric, Once  lansoprazole, 30 mg, Nasogastric, Q AM  levothyroxine, 100 mcg, Nasogastric, Q AM  meropenem, 1,000 mg, Intravenous, Q12H  polyethylene glycol, 17 g, Oral, Daily  rOPINIRole, 0.25 mg, Oral, Nightly  rosuvastatin, 10 mg, Nasogastric, Nightly  senna-docusate sodium, 2 tablet, Nasogastric, BID  sertraline, 150 mg, Nasogastric, Daily  sodium chloride, 10 mL, Intravenous, Q12H  sodium chloride, 10 mL, Intravenous, Q12H        Infusion Meds  dexmedetomidine, 0.2-1.5 mcg/kg/hr, Last Rate: 1 mcg/kg/hr (10/24/23 0839)        PRN Meds    acetaminophen    cyclobenzaprine    dextrose    dextrose    dextrose    fentaNYL citrate (PF)    glucagon (human recombinant)    hydrALAZINE    ipratropium-albuterol    lidocaine    nitroglycerin    ondansetron    oxyCODONE    polyethylene glycol    [COMPLETED] Insert Peripheral IV **AND** sodium chloride    sodium chloride    sodium chloride    sodium chloride    sodium chloride        Assessment & Plan       Antimicrobial Therapy   1.  IV Zosyn        2.        3.        4.        5.            Assessment     Acute respiratory failure.  Most likely secondary to hospital-acquired pneumonia.  CT scan of the chest on October 18 showed bilateral lower lobe infiltrates.  There was no additional findings consistent with PCP pneumonia  The patient is currently on  ventilator on 50% FiO2 and 8 PEEP  Patient is s/p bronchoscopy and BAL cultures are negative  MRSA screen was negative     Severe lymphocytosis.  Most likely secondary to underlying CLL.  Patient was not on treatment prior to admission     History of breast cancer s/p mastectomies in the past     UTI with Klebsiella pneumoniae and E. coli.  Organisms were relatively susceptible     Diabetes mellitus type 2     Rhinovirus infection.  Nasal swab was positive for rhinovirus PCR    Mild septic shock-currently off vasopressors    Possible ventilator associated pneumonia.  Tracheal aspirate culture from October 23, 2023 is growing 4+ gram-negative bacilli despite patient being on broad-spectrum antibiotics.  Concerning for multidrug-resistant organism     Plan     Discontinue IV Zosyn  Start meropenem 1 g IV every 12 hours waiting on final sputum culture results  Continue supportive care  Case was discussed with family at bedside  A.carlo Santos MD  10/24/23  12:19 EDT    Note is dictated utilizing voice recognition software/Dragon

## 2023-10-24 NOTE — CONSULTS
Nutrition Services    Patient Name: Diane Guan  YOB: 1940  MRN: [id number]  Admission date: 10/10/2023    Comment:  -- Continue Peptamen AF at 65mL/hr + 75mL/hr water flush       PPE Documentation        PPE Worn By Provider mask and gloves   PPE Worn By Patient  None      CLINICAL NUTRITION ASSESSMENT      Reason for Assessment Follow up protocol   10/17: Consult for tube feeding, LOS x 7 days      H&P      Past Medical History:   Diagnosis Date    Acute bronchitis due to human metapneumovirus 02/08/2020    Anxiety disorder     Arthritis     Breast cancer 02/2019    Invasive ductal carcinoma    Chronic lymphocytic leukemia (CLL), B-cell 01/2019    Depression     Disease of thyroid gland     Diverticulosis of colon 2018    Identified on colonoscopy    Dysphagia 12/2020    Dyspnea on exertion     Fall at home 10/11/2023    Hemorrhoid     Hiatal hernia 12/2020    Hiatal hernia with GERD     large hiatal hernia with high-grade reflux on barium swallow; s/p laparoscopic fundoplication with gastropexy    History of breast cancer 10/11/2023    History of cigarette smoking 10/11/2023    History of diabetes mellitus     no meds now    Hyperlipidemia     Hypertension     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    aspiration pneumonia; completed abx therapy    OAB (overactive bladder)     Personal history of CLL (chronic lymphocytic leukemia) 10/11/2023    Skin cancer     Sleep apnea     daughter states pt does not have and doesn't have machine       Past Surgical History:   Procedure Laterality Date    BLADDER SURGERY      BREAST BIOPSY      BRONCHOSCOPY N/A 09/13/2019    Procedure: BRONCHOSCOPY with bronchial washing;  Surgeon: Marnie Frankel MD;  Location: [facility] ENDOSCOPY;  Service: Pulmonary    BRONCHOSCOPY Bilateral 10/18/2023    Procedure: BRONCHOSCOPY AT BEDSIDE;  Surgeon: Vinicius Heredia DO;  Location: [facility] ENDOSCOPY;  Service: Pulmonary;  Laterality: Bilateral;    COLONOSCOPY  07/19/2018     "severe diverticulosis    CYST REMOVAL      Removed a fatty cyst off of her back    ENDOSCOPY N/A 11/23/2020    Procedure: ESOPHAGOGASTRODUODENOSCOPY with dilatation (Bougie # 48, 50, 52, 54, 56, 58);  Surgeon: Den Plummer DO;  Location: [facility] ENDOSCOPY;  Service: General;  Laterality: N/A;  Post: large hiatal hernia, joshua's ulcers    HIATAL HERNIA REPAIR N/A 12/30/2020    Procedure: Laparoscopic hiatal hernia repair with gastropexy;  Surgeon: Den Plummer DO;  Location: [facility] MAIN OR;  Service: General;  Laterality: N/A;    MASTECTOMY Right 02/04/2019    Invasive ductal carcinoma    TUBAL ABDOMINAL LIGATION          Current Problems   Acute respiratory failure  - intubated 10/15     LEXUS  - Nephrology following    Fall with right-sided chest pain     DM2     Hypertension     Breast cancer       Encounter Information        Trending Narrative     10/24: Since last RD review, remains on vent support.  Continues on tube feeding.  Patient discussed in AM rounds.  On precedex.  RD visited patient at bedside.  NFPE completed and not consistent with nutrition diagnosis of malnutrition at this time using AND/ASPEN criteria.      10/17: Admitted on 10/14 after a fall with right rib and right sided back pain.  Code event 10/15 resulting in intubation.  Patient discussed in AM rounds on 10/17 and MD wishes to begin tube feeding via OG tube already in place.  NFPE completed and not consistent with nutrition diagnosis of malnutrition at this time using AND/ASPEN criteria.  Noted with temporal muscle loss - likely age related.         Anthropometrics        Current Height, Weight Height: 162.6 cm (64\")  Weight: 77.5 kg (170 lb 13.7 oz) (10/24/23 0300)       Usual Body Weight (UBW) Unable to obtain from patient        Trending Weight Hx     This admission: 10/24: 170#, 3.9% weight loss since last RD review, +4.7L per I/Os  10/17: 177#             PTA: 4.3% weight loss x 11 months     Wt Readings from Last 30 " Encounters:   10/24/23 0300 77.5 kg (170 lb 13.7 oz)   10/23/23 0400 80.2 kg (176 lb 12.9 oz)   10/22/23 0456 83.7 kg (184 lb 8.4 oz)   10/20/23 1021 83.9 kg (185 lb)   10/20/23 0600 84.1 kg (185 lb 6.5 oz)   10/18/23 0550 83.2 kg (183 lb 6.8 oz)   10/15/23 0952 80.3 kg (177 lb)   10/14/23 0040 80.7 kg (177 lb 14.6 oz)   10/10/23 2332 79 kg (174 lb 2.6 oz)   10/10/23 1252 78.5 kg (173 lb)   09/15/23 0917 80.7 kg (178 lb)   05/15/23 0918 80.3 kg (177 lb)   05/05/23 0852 80.3 kg (177 lb)   01/16/23 0919 82.6 kg (182 lb)   11/04/22 0806 83.9 kg (185 lb)   09/21/22 1520 82.6 kg (182 lb)   08/31/22 0832 85.3 kg (188 lb)   08/05/22 1559 85.5 kg (188 lb 9.6 oz)   03/15/22 0927 83 kg (183 lb)   10/25/21 1229 86.6 kg (191 lb)   07/22/21 0855 82.1 kg (181 lb)   07/08/21 0600 89.5 kg (197 lb 5 oz)   07/07/21 0600 88.7 kg (195 lb 8.8 oz)   07/05/21 2300 88.5 kg (195 lb 1.7 oz)   07/04/21 0600 85.3 kg (188 lb 0.8 oz)   07/02/21 1617 81.6 kg (180 lb)   03/26/21 0823 83.5 kg (184 lb)   01/20/21 1002 80.7 kg (178 lb)   01/13/21 0536 86.2 kg (190 lb 0.6 oz)   01/12/21 0500 84.9 kg (187 lb 2.7 oz)   01/11/21 0500 81.8 kg (180 lb 5.4 oz)   01/10/21 0500 82.8 kg (182 lb 8.7 oz)   01/09/21 0500 81.9 kg (180 lb 8.9 oz)   01/08/21 0551 85.4 kg (188 lb 4.4 oz)   01/07/21 0529 83.9 kg (184 lb 15.5 oz)   01/06/21 1414 82.1 kg (181 lb)   01/06/21 0536 82.5 kg (181 lb 14.1 oz)   01/05/21 0500 83.9 kg (184 lb 15.5 oz)   01/04/21 0506 83.9 kg (184 lb 15.5 oz)   01/03/21 0525 87.3 kg (192 lb 7.4 oz)   12/31/20 0534 83.6 kg (184 lb 4.9 oz)   12/16/20 1444 81.6 kg (180 lb)   12/03/20 1423 85.7 kg (189 lb)   11/23/20 0646 85.6 kg (188 lb 11.4 oz)   11/16/20 1200 85.3 kg (188 lb)   11/05/20 1427 85.3 kg (188 lb)   08/18/20 0949 85.3 kg (188 lb)   05/15/20 0818 83.5 kg (184 lb)   02/14/20 1042 81.6 kg (179 lb 12.8 oz)   02/12/20 0523 83 kg (183 lb)   02/11/20 0451 83.3 kg (183 lb 9.6 oz)   02/08/20 0330 80.1 kg (176 lb 9.4 oz)   02/07/20 0347 80.7 kg  (177 lb 14.6 oz)   02/06/20 1248 81.5 kg (179 lb 10.8 oz)   02/04/20 1505 83 kg (183 lb)   01/03/20 1110 83.7 kg (184 lb 9.6 oz)   09/13/19 0730 80.5 kg (177 lb 7.5 oz)   09/12/19 0908 77.1 kg (170 lb)   06/25/19 1116 75.8 kg (167 lb)   04/04/19 0857 73 kg (161 lb)   01/24/19 1502 77.8 kg (171 lb 9.6 oz)   12/27/18 1118 77.6 kg (171 lb)      BMI kg/m2 Body mass index is 29.33 kg/m².       Labs        Pertinent Labs    Results from last 7 days   Lab Units 10/24/23  0307 10/23/23  0421 10/22/23  0455   SODIUM mmol/L 143 147* 147*   POTASSIUM mmol/L 4.0 3.9 4.6   CHLORIDE mmol/L 103 105 107   CO2 mmol/L 27.0 27.0 26.0   BUN mg/dL 93* 88* 73*   CREATININE mg/dL 1.67* 1.71* 2.18*   CALCIUM mg/dL 9.3 9.9 9.5   BILIRUBIN mg/dL 0.2 0.2 0.2   ALK PHOS U/L 127* 163* 155*   ALT (SGPT) U/L 24 22 24   AST (SGOT) U/L 30 20 19   GLUCOSE mg/dL 244* 219* 257*     Results from last 7 days   Lab Units 10/24/23  0454 10/24/23  0307 10/23/23  0421 10/22/23  0455   MAGNESIUM mg/dL  --  1.9 1.9 1.8   PHOSPHORUS mg/dL  --  2.8  --  3.2   HEMOGLOBIN g/dL 8.0*  --  8.8* 8.6*   HEMATOCRIT % 25.9*  --  28.1* 27.9*     Lab Results   Component Value Date    HGBA1C 6.50 (H) 10/13/2023        Medications    Scheduled Medications amLODIPine, 10 mg, Nasogastric, Q24H  bumetanide, 2 mg, Intravenous, Daily  enoxaparin, 30 mg, Subcutaneous, Daily  ferrous sulfate, 300 mg, Nasogastric, Once per day on Mon Wed Fri  [START ON 10/25/2023] insulin glargine, 30 Units, Subcutaneous, Daily  insulin lispro, 4-24 Units, Subcutaneous, Q6H  lactulose, 20 g, Nasogastric, Once  lansoprazole, 30 mg, Nasogastric, Q AM  levothyroxine, 100 mcg, Nasogastric, Q AM  meropenem, 1,000 mg, Intravenous, Q12H  polyethylene glycol, 17 g, Oral, Daily  rOPINIRole, 0.25 mg, Oral, Nightly  rosuvastatin, 10 mg, Nasogastric, Nightly  senna-docusate sodium, 2 tablet, Nasogastric, BID  sertraline, 150 mg, Nasogastric, Daily  sodium chloride, 10 mL, Intravenous, Q12H  sodium chloride, 10  mL, Intravenous, Q12H        Infusions dexmedetomidine, 0.2-1.5 mcg/kg/hr, Last Rate: 1 mcg/kg/hr (10/24/23 1252)        PRN Medications   acetaminophen    cyclobenzaprine    dextrose    dextrose    dextrose    fentaNYL citrate (PF)    glucagon (human recombinant)    hydrALAZINE    ipratropium-albuterol    lidocaine    nitroglycerin    ondansetron    oxyCODONE    polyethylene glycol    [COMPLETED] Insert Peripheral IV **AND** sodium chloride    sodium chloride    sodium chloride    sodium chloride    sodium chloride     Physical Findings        Trending Physical   Appearance, NFPE 10/24: Agree with previous assessment    10/17: NFPE completed and not consistent with nutrition diagnosis of malnutrition at this time using AND/ASPEN criteria.  Noted with temporal muscle loss likely age related.      --  Edema  No edema documented      Bowel Function Last documented BM 10/22 (x 2 days ago)     Tubes OG tube      Chewing/Swallowing Intubated      Skin Intact        Estimated/Assessed Needs    Estimated 10/24/23   Energy Requirements    EST Needs, Method, Wt used 1610 kcal/day ([facility] 203b)       Protein Requirements    EST Needs, Method, Wt used  grams/day (1.2-2.0 g/kg of IBW 54.5 kg)       Fluid Requirements     Estimated Needs (mL/day) 1 mL/kcal, will monitor hydration status      Fluid Deficit    Current Na Level (mEq/L)    Desired Na Level (mEq/L)    Estimated Fluid Deficit (L)       Current Nutrition Orders & Evaluation of Intake       Oral Nutrition     Food Allergies NKFA   Current PO Diet NPO Diet NPO Type: Strict NPO   Supplement None ordered    PO Evaluation     Trending % PO Intake 10/24: NPO  10/17: NPO     Enteral Nutrition    Enteral Route OG tube    Order, Modulars, Flushes Peptaman AF at 60 mL/hour + 75 mL/hour water flush    Residual/Tolerance WNL   TF Observation  Observed as above at bedside        Parenteral Nutrition     TPN Route    Total # Days on TPN    TPN Order, Lipid Details    MVI &  Trace Element Freq    TPN Observation         Nutrition Course Details    PO Diets: Heart Healthy, Low Potassium 10/15  NPO 10/15 to current 10/24   Nutrition Support: Tube feeding began 10/17, continues 10/24     Nutritional Risk Screening        NRS-2002 Score          Nutrition Diagnosis         Nutrition Dx Problem 1 Inadequate energy intake related to vent as evidenced by NPO/TF for nutrition.       Nutrition Dx Problem 2        Intervention Goal         Intervention Goal(s) Tolerate EN     Nutrition Intervention        RD Action Continue EN     Nutrition Prescription          Diet Prescription NPO   Supplement Prescription      Enteral Prescription End Goal:    Peptamen AF at 65 mL/hr + 75mL/hr water flush      Calories  1716 kcals (107%)   Protein  107 g (98% upper end)   Free water  1158 mL   Flushes  1650 mL     The above end goal rate is for 22 hrs/day to assume interruptions for ADLs. Water flushes adjusted based on clinical picture + Rx flushes/IV fluids     Specialized formula chosen r/t high protein needs in the critical care setting.           TPN Prescription      Monitor/Evaluation        Monitor Per protocol, I&O, Pertinent labs, EN delivery/tolerance, Weight, Skin status, GI status, Symptoms       Electronically signed by:  Vernell Gil RD  10/24/23 13:27 EDT

## 2023-10-25 ENCOUNTER — APPOINTMENT (OUTPATIENT)
Dept: GENERAL RADIOLOGY | Facility: HOSPITAL | Age: 83
DRG: 207 | End: 2023-10-25
Payer: MEDICARE

## 2023-10-25 LAB
ALBUMIN SERPL-MCNC: 2.6 G/DL (ref 3.5–5.2)
ALBUMIN/GLOB SERPL: 0.9 G/DL
ALP SERPL-CCNC: 118 U/L (ref 39–117)
ALT SERPL W P-5'-P-CCNC: 22 U/L (ref 1–33)
ANION GAP SERPL CALCULATED.3IONS-SCNC: 14 MMOL/L (ref 5–15)
ANISOCYTOSIS BLD QL: ABNORMAL
ARTERIAL PATENCY WRIST A: ABNORMAL
ARTERIAL PATENCY WRIST A: POSITIVE
AST SERPL-CCNC: 22 U/L (ref 1–32)
ATMOSPHERIC PRESS: ABNORMAL MM[HG]
ATMOSPHERIC PRESS: ABNORMAL MM[HG]
BACTERIA SPEC RESP CULT: ABNORMAL
BACTERIA SPEC RESP CULT: ABNORMAL
BASE EXCESS BLDA CALC-SCNC: -0.3 MMOL/L (ref 0–3)
BASE EXCESS BLDA CALC-SCNC: 1.6 MMOL/L (ref 0–3)
BDY SITE: ABNORMAL
BDY SITE: ABNORMAL
BILIRUB SERPL-MCNC: <0.2 MG/DL (ref 0–1.2)
BUN SERPL-MCNC: 108 MG/DL (ref 8–23)
BUN/CREAT SERPL: 75.5 (ref 7–25)
CALCIUM SPEC-SCNC: 9 MG/DL (ref 8.6–10.5)
CHLORIDE SERPL-SCNC: 100 MMOL/L (ref 98–107)
CO2 BLDA-SCNC: 24.3 MMOL/L (ref 22–29)
CO2 BLDA-SCNC: 26.2 MMOL/L (ref 22–29)
CO2 SERPL-SCNC: 24 MMOL/L (ref 22–29)
CREAT SERPL-MCNC: 1.43 MG/DL (ref 0.57–1)
DEPRECATED RDW RBC AUTO: 47.7 FL (ref 37–54)
EGFRCR SERPLBLD CKD-EPI 2021: 36.7 ML/MIN/1.73
EOSINOPHIL # BLD MANUAL: 0.18 10*3/MM3 (ref 0–0.4)
EOSINOPHIL NFR BLD MANUAL: 1 % (ref 0.3–6.2)
ERYTHROCYTE [DISTWIDTH] IN BLOOD BY AUTOMATED COUNT: 14.8 % (ref 12.3–15.4)
GLOBULIN UR ELPH-MCNC: 2.8 GM/DL
GLUCOSE BLDC GLUCOMTR-MCNC: 138 MG/DL (ref 70–105)
GLUCOSE BLDC GLUCOMTR-MCNC: 139 MG/DL (ref 70–105)
GLUCOSE BLDC GLUCOMTR-MCNC: 153 MG/DL (ref 70–105)
GLUCOSE SERPL-MCNC: 204 MG/DL (ref 65–99)
GRAM STN SPEC: ABNORMAL
HCO3 BLDA-SCNC: 23.3 MMOL/L (ref 21–28)
HCO3 BLDA-SCNC: 25.1 MMOL/L (ref 21–28)
HCT VFR BLD AUTO: 24.4 % (ref 34–46.6)
HEMOCCULT STL QL IA: POSITIVE
HEMODILUTION: NO
HEMODILUTION: NO
HGB BLD-MCNC: 7.7 G/DL (ref 12–15.9)
INHALED O2 CONCENTRATION: 100 %
INHALED O2 CONCENTRATION: 50 %
LARGE PLATELETS: ABNORMAL
LYMPHOCYTES # BLD MANUAL: 8.97 10*3/MM3 (ref 0.7–3.1)
LYMPHOCYTES NFR BLD MANUAL: 1 % (ref 5–12)
MAGNESIUM SERPL-MCNC: 2.1 MG/DL (ref 1.6–2.4)
MCH RBC QN AUTO: 27.4 PG (ref 26.6–33)
MCHC RBC AUTO-ENTMCNC: 31.6 G/DL (ref 31.5–35.7)
MCV RBC AUTO: 86.6 FL (ref 79–97)
MODALITY: ABNORMAL
MODALITY: ABNORMAL
MONOCYTES # BLD: 0.18 10*3/MM3 (ref 0.1–0.9)
NEUTROPHILS # BLD AUTO: 8.97 10*3/MM3 (ref 1.7–7)
NEUTROPHILS NFR BLD MANUAL: 46 % (ref 42.7–76)
NEUTS BAND NFR BLD MANUAL: 3 % (ref 0–5)
PCO2 BLDA: 32.9 MM HG (ref 35–48)
PCO2 BLDA: 34 MM HG (ref 35–48)
PEEP RESPIRATORY: 5 CM[H2O]
PEEP RESPIRATORY: 5 CM[H2O]
PH BLDA: 7.46 PH UNITS (ref 7.35–7.45)
PH BLDA: 7.48 PH UNITS (ref 7.35–7.45)
PLATELET # BLD AUTO: 172 10*3/MM3 (ref 140–450)
PMV BLD AUTO: 9.6 FL (ref 6–12)
PO2 BLDA: 79.9 MM HG (ref 83–108)
PO2 BLDA: 87.3 MM HG (ref 83–108)
POTASSIUM SERPL-SCNC: 3.8 MMOL/L (ref 3.5–5.2)
PROT SERPL-MCNC: 5.4 G/DL (ref 6–8.5)
PSV: 10 CMH2O
RBC # BLD AUTO: 2.81 10*6/MM3 (ref 3.77–5.28)
RESPIRATORY RATE: 22
SAO2 % BLDCOA: 96.6 % (ref 94–98)
SAO2 % BLDCOA: 97.3 % (ref 94–98)
SCAN SLIDE: NORMAL
SODIUM SERPL-SCNC: 138 MMOL/L (ref 136–145)
VARIANT LYMPHS NFR BLD MANUAL: 49 % (ref 19.6–45.3)
VENTILATOR MODE: ABNORMAL
VENTILATOR MODE: AC
VT ON VENT VENT: 500 ML
WBC MORPH BLD: NORMAL
WBC NRBC COR # BLD: 18.3 10*3/MM3 (ref 3.4–10.8)

## 2023-10-25 PROCEDURE — 0BH17EZ INSERTION OF ENDOTRACHEAL AIRWAY INTO TRACHEA, VIA NATURAL OR ARTIFICIAL OPENING: ICD-10-PCS | Performed by: STUDENT IN AN ORGANIZED HEALTH CARE EDUCATION/TRAINING PROGRAM

## 2023-10-25 PROCEDURE — 83735 ASSAY OF MAGNESIUM: CPT | Performed by: STUDENT IN AN ORGANIZED HEALTH CARE EDUCATION/TRAINING PROGRAM

## 2023-10-25 PROCEDURE — 94799 UNLISTED PULMONARY SVC/PX: CPT

## 2023-10-25 PROCEDURE — 63710000001 INSULIN GLARGINE PER 5 UNITS: Performed by: INTERNAL MEDICINE

## 2023-10-25 PROCEDURE — 0 MIDAZOLAM 1 MG/ML 100ML NS 100 MG/100ML SOLUTION: Performed by: STUDENT IN AN ORGANIZED HEALTH CARE EDUCATION/TRAINING PROGRAM

## 2023-10-25 PROCEDURE — 31500 INSERT EMERGENCY AIRWAY: CPT | Performed by: STUDENT IN AN ORGANIZED HEALTH CARE EDUCATION/TRAINING PROGRAM

## 2023-10-25 PROCEDURE — 71045 X-RAY EXAM CHEST 1 VIEW: CPT

## 2023-10-25 PROCEDURE — 85025 COMPLETE CBC W/AUTO DIFF WBC: CPT | Performed by: NURSE PRACTITIONER

## 2023-10-25 PROCEDURE — 82274 ASSAY TEST FOR BLOOD FECAL: CPT | Performed by: INTERNAL MEDICINE

## 2023-10-25 PROCEDURE — 82948 REAGENT STRIP/BLOOD GLUCOSE: CPT

## 2023-10-25 PROCEDURE — 25010000002 FENTANYL CITRATE (PF) 50 MCG/ML SOLUTION: Performed by: STUDENT IN AN ORGANIZED HEALTH CARE EDUCATION/TRAINING PROGRAM

## 2023-10-25 PROCEDURE — 25010000002 ENOXAPARIN PER 10 MG: Performed by: INTERNAL MEDICINE

## 2023-10-25 PROCEDURE — 25010000002 MIDAZOLAM PER 1 MG: Performed by: STUDENT IN AN ORGANIZED HEALTH CARE EDUCATION/TRAINING PROGRAM

## 2023-10-25 PROCEDURE — 94003 VENT MGMT INPAT SUBQ DAY: CPT

## 2023-10-25 PROCEDURE — 82803 BLOOD GASES ANY COMBINATION: CPT

## 2023-10-25 PROCEDURE — 36600 WITHDRAWAL OF ARTERIAL BLOOD: CPT

## 2023-10-25 PROCEDURE — 25010000002 FENTANYL CITRATE (PF) 50 MCG/ML SOLUTION: Performed by: INTERNAL MEDICINE

## 2023-10-25 PROCEDURE — 63710000001 INSULIN LISPRO (HUMAN) PER 5 UNITS: Performed by: INTERNAL MEDICINE

## 2023-10-25 PROCEDURE — 25010000002 MIDAZOLAM PER 1 MG

## 2023-10-25 PROCEDURE — 25010000002 MORPHINE PER 10 MG: Performed by: NURSE PRACTITIONER

## 2023-10-25 PROCEDURE — 85007 BL SMEAR W/DIFF WBC COUNT: CPT | Performed by: NURSE PRACTITIONER

## 2023-10-25 PROCEDURE — 94002 VENT MGMT INPAT INIT DAY: CPT

## 2023-10-25 PROCEDURE — 5A1945Z RESPIRATORY VENTILATION, 24-96 CONSECUTIVE HOURS: ICD-10-PCS | Performed by: INTERNAL MEDICINE

## 2023-10-25 PROCEDURE — 80053 COMPREHEN METABOLIC PANEL: CPT | Performed by: STUDENT IN AN ORGANIZED HEALTH CARE EDUCATION/TRAINING PROGRAM

## 2023-10-25 PROCEDURE — 25010000002 ONDANSETRON PER 1 MG: Performed by: INTERNAL MEDICINE

## 2023-10-25 PROCEDURE — 25010000002 MEROPENEM PER 100 MG: Performed by: INTERNAL MEDICINE

## 2023-10-25 RX ORDER — VECURONIUM BROMIDE 1 MG/ML
10 INJECTION, POWDER, LYOPHILIZED, FOR SOLUTION INTRAVENOUS ONCE
Status: COMPLETED | OUTPATIENT
Start: 2023-10-25 | End: 2023-10-25

## 2023-10-25 RX ORDER — BUMETANIDE 0.25 MG/ML
2 INJECTION INTRAMUSCULAR; INTRAVENOUS ONCE
Status: COMPLETED | OUTPATIENT
Start: 2023-10-25 | End: 2023-10-25

## 2023-10-25 RX ORDER — FENTANYL CITRATE-0.9 % NACL/PF 10 MCG/ML
50-300 PLASTIC BAG, INJECTION (ML) INTRAVENOUS
Status: DISCONTINUED | OUTPATIENT
Start: 2023-10-25 | End: 2023-10-27

## 2023-10-25 RX ORDER — FENTANYL CITRATE 50 UG/ML
50 INJECTION, SOLUTION INTRAMUSCULAR; INTRAVENOUS
Status: DISCONTINUED | OUTPATIENT
Start: 2023-10-25 | End: 2023-10-28

## 2023-10-25 RX ORDER — MIDAZOLAM HYDROCHLORIDE 1 MG/ML
2 INJECTION INTRAMUSCULAR; INTRAVENOUS ONCE
Status: COMPLETED | OUTPATIENT
Start: 2023-10-25 | End: 2023-10-25

## 2023-10-25 RX ORDER — MIDAZOLAM IN NACL,ISO-OSMOT/PF 50 MG/50ML
1-10 INFUSION BOTTLE (ML) INTRAVENOUS
Status: DISCONTINUED | OUTPATIENT
Start: 2023-10-25 | End: 2023-10-26

## 2023-10-25 RX ORDER — MIDAZOLAM HYDROCHLORIDE 1 MG/ML
INJECTION INTRAMUSCULAR; INTRAVENOUS
Status: COMPLETED
Start: 2023-10-25 | End: 2023-10-25

## 2023-10-25 RX ORDER — ETOMIDATE 2 MG/ML
0.3 INJECTION INTRAVENOUS ONCE
Status: COMPLETED | OUTPATIENT
Start: 2023-10-25 | End: 2023-10-25

## 2023-10-25 RX ORDER — MORPHINE SULFATE 2 MG/ML
2 INJECTION, SOLUTION INTRAMUSCULAR; INTRAVENOUS ONCE
Status: COMPLETED | OUTPATIENT
Start: 2023-10-25 | End: 2023-10-25

## 2023-10-25 RX ORDER — ETOMIDATE 2 MG/ML
INJECTION INTRAVENOUS
Status: DISPENSED
Start: 2023-10-25 | End: 2023-10-26

## 2023-10-25 RX ADMIN — DEXMEDETOMIDINE HYDROCHLORIDE 1.2 MCG/KG/HR: 4 INJECTION, SOLUTION INTRAVENOUS at 06:10

## 2023-10-25 RX ADMIN — OXYCODONE HYDROCHLORIDE 5 MG: 5 TABLET ORAL at 21:32

## 2023-10-25 RX ADMIN — ENOXAPARIN SODIUM 30 MG: 100 INJECTION SUBCUTANEOUS at 15:28

## 2023-10-25 RX ADMIN — FENTANYL CITRATE 25 MCG: 50 INJECTION, SOLUTION INTRAMUSCULAR; INTRAVENOUS at 04:31

## 2023-10-25 RX ADMIN — MEROPENEM 1000 MG: 1 INJECTION, POWDER, FOR SOLUTION INTRAVENOUS at 17:30

## 2023-10-25 RX ADMIN — LANSOPRAZOLE 30 MG: 30 TABLET, ORALLY DISINTEGRATING ORAL at 06:09

## 2023-10-25 RX ADMIN — MORPHINE SULFATE 2 MG: 2 INJECTION, SOLUTION INTRAMUSCULAR; INTRAVENOUS at 17:24

## 2023-10-25 RX ADMIN — Medication 10 ML: at 21:33

## 2023-10-25 RX ADMIN — INSULIN LISPRO 4 UNITS: 100 INJECTION, SOLUTION INTRAVENOUS; SUBCUTANEOUS at 22:58

## 2023-10-25 RX ADMIN — DEXMEDETOMIDINE HYDROCHLORIDE 1.2 MCG/KG/HR: 4 INJECTION, SOLUTION INTRAVENOUS at 02:57

## 2023-10-25 RX ADMIN — Medication 10 ML: at 08:40

## 2023-10-25 RX ADMIN — LEVOTHYROXINE SODIUM 100 MCG: 0.1 TABLET ORAL at 06:10

## 2023-10-25 RX ADMIN — SENNOSIDES AND DOCUSATE SODIUM 2 TABLET: 8.6; 5 TABLET ORAL at 21:32

## 2023-10-25 RX ADMIN — OXYCODONE HYDROCHLORIDE 5 MG: 5 TABLET ORAL at 02:56

## 2023-10-25 RX ADMIN — MIDAZOLAM HYDROCHLORIDE 3 MG/HR: 1 INJECTION, SOLUTION INTRAVENOUS at 20:51

## 2023-10-25 RX ADMIN — Medication 50 MCG/HR: at 20:51

## 2023-10-25 RX ADMIN — FENTANYL CITRATE 25 MCG: 50 INJECTION, SOLUTION INTRAMUSCULAR; INTRAVENOUS at 00:25

## 2023-10-25 RX ADMIN — MEROPENEM 1000 MG: 1 INJECTION, POWDER, FOR SOLUTION INTRAVENOUS at 06:11

## 2023-10-25 RX ADMIN — ONDANSETRON 4 MG: 2 INJECTION INTRAMUSCULAR; INTRAVENOUS at 19:41

## 2023-10-25 RX ADMIN — MIDAZOLAM 2 MG: 1 INJECTION INTRAMUSCULAR; INTRAVENOUS at 20:35

## 2023-10-25 RX ADMIN — MIDAZOLAM 2 MG: 1 INJECTION INTRAMUSCULAR; INTRAVENOUS at 20:33

## 2023-10-25 RX ADMIN — ETOMIDATE 20 MG: 2 INJECTION INTRAVENOUS at 20:32

## 2023-10-25 RX ADMIN — INSULIN GLARGINE 30 UNITS: 100 INJECTION, SOLUTION SUBCUTANEOUS at 08:37

## 2023-10-25 RX ADMIN — BUMETANIDE 2 MG: 0.25 INJECTION INTRAMUSCULAR; INTRAVENOUS at 17:36

## 2023-10-25 RX ADMIN — INSULIN LISPRO 8 UNITS: 100 INJECTION, SOLUTION INTRAVENOUS; SUBCUTANEOUS at 06:10

## 2023-10-25 RX ADMIN — FENTANYL CITRATE 25 MCG: 50 INJECTION, SOLUTION INTRAMUSCULAR; INTRAVENOUS at 19:41

## 2023-10-25 RX ADMIN — SENNOSIDES AND DOCUSATE SODIUM 2 TABLET: 8.6; 5 TABLET ORAL at 08:37

## 2023-10-25 RX ADMIN — Medication 10 ML: at 21:32

## 2023-10-25 RX ADMIN — ROSUVASTATIN 10 MG: 10 TABLET, FILM COATED ORAL at 21:32

## 2023-10-25 RX ADMIN — FENTANYL CITRATE 50 MCG: 50 INJECTION, SOLUTION INTRAMUSCULAR; INTRAVENOUS at 20:34

## 2023-10-25 RX ADMIN — DEXMEDETOMIDINE HYDROCHLORIDE 0.8 MCG/KG/HR: 4 INJECTION, SOLUTION INTRAVENOUS at 21:55

## 2023-10-25 RX ADMIN — SERTRALINE 150 MG: 100 TABLET, FILM COATED ORAL at 08:37

## 2023-10-25 RX ADMIN — DEXMEDETOMIDINE HYDROCHLORIDE 1.5 MCG/KG/HR: 4 INJECTION, SOLUTION INTRAVENOUS at 18:49

## 2023-10-25 RX ADMIN — VECURONIUM BROMIDE 10 MG: 1 INJECTION, POWDER, LYOPHILIZED, FOR SOLUTION INTRAVENOUS at 20:33

## 2023-10-25 RX ADMIN — FENTANYL CITRATE 25 MCG: 50 INJECTION, SOLUTION INTRAMUSCULAR; INTRAVENOUS at 20:34

## 2023-10-25 RX ADMIN — DEXMEDETOMIDINE HYDROCHLORIDE 1 MCG/KG/HR: 4 INJECTION, SOLUTION INTRAVENOUS at 15:27

## 2023-10-25 RX ADMIN — Medication 300 MG: at 08:37

## 2023-10-25 RX ADMIN — DEXMEDETOMIDINE HYDROCHLORIDE 1 MCG/KG/HR: 4 INJECTION, SOLUTION INTRAVENOUS at 11:10

## 2023-10-25 RX ADMIN — ROPINIROLE HYDROCHLORIDE 0.25 MG: 0.25 TABLET, FILM COATED ORAL at 21:32

## 2023-10-25 NOTE — NURSING NOTE
Call back received from Dr. Bonds over positive hemoccult stool on pt. Dr Bonds recommended GI consult, but told to run by Intensivist and see what they think first. Primary RN notified.

## 2023-10-25 NOTE — PROGRESS NOTES
Critical Care Progress Note   Diane Guan : 1940 MRN:[id number] LOS:13     Principal Problem: Acute hypoxemic respiratory failure     Reason for follow up: All the medical problems listed below    Summary     A 83 y.o. female with PMH of diabetes, hypertension, breast cancer presented to the hospital after a fall and was admitted with a principal diagnosis of Acute hypoxemic respiratory failure.  On admission, patient required 2 L nasal cannula and subsequently had worsening oxygenation with hypercapnic respiratory failure.  Failed BiPAP and subsequently was intubated on 10/15.  Initially treated with broad-spectrum antibiotics and ID was consulted.  RVP positive for rhinovirus, sputum cultures were negative.  Treated with diuretics for fluid overload.  Developed profound hypoxia and needed deep sedation.  Had bronch on 10/18.  Oxygenation improved with aggressive diuretics.      Also had LEXUS on admission, nephrology was consulted.  Renal ultrasound with no obstruction.  Treated with diuretics.  Subsequently, urine cultures grew both E. coli and Klebsiella pneumonia.  Treated with Zosyn for 7 days.  During the hospital course, patient also developed elevated liver enzymes.  Gallbladder ultrasound showed dilated common bile duct measuring 14 mm with distended gallbladder and sludge.  GI was consulted and recommended conservative management.  Liver enzymes subsequently normalized.    Significant events     10/25/23 : Spontaneous breathing trial with a goal extubation today.  Low threshold for BiPAP after extubation.    Assessment / Plan     Acute respiratory failure with hypoxia and hypercapnia / ARDS:   Due to fluid overload with pulmonary edema.  Possible contribution from rhinovirus infection with resultant ARDS.  Ventilator settings noted and adjusted as needed.  Currently down to 5 of PEEP and 50% FiO2.  Chest x-ray with significant improvement in aeration.  Some tachypnea, mostly driven by anxiety  and gets better with reassurance.  Continue with Precedex for anxiety.  Currently on spontaneous breathing trial, will get an ABG and if appropriate, likely can extubate.     Septic shock due to UTI / E. coli and Klebsiella bacteremia  RVP: +Rhinovirus  BAL cultures were negative.  Sputum cultures from 10/23 with Klebsiella.  ID following, currently on meropenem.  Shock state has resolved.     Acute on chronic heart failure with preserved EF:   Currently well compensated.  Last ECHO showed an EF of 70% with indeterminate LV diastolic function.   Diuretics on hold.  Monitor Input/Output very closely. Follow daily weights.   Net IO Since Admission: 7,934.95 mL [10/25/23 1219]    Transaminitis with dilated common bile duct  Gallbladder ultrasound with dilated CBD, measuring 14 mm with gall sludge.  GI following.  Liver enzymes normalized.     Acute toxic / metabolic encephalopathy  CT head on 10/14 with no acute pathology.  Likely due to residual effects of deep sedation for multiple days.  Slowly improving mentation.     Acute kidney injury on CKD stage III  Remains non oliguric.  Baseline creatinine of 1.3.  Likely ATN from septic shock.  Nephrology following  Has indwelling Bellamy catheter due to acute urinary retention.  We will address it after extubation.  Monitor Input/Output very closely.   Net IO Since Admission: 7,934.95 mL [10/25/23 1219]     Diabetes mellitus Type 2, not insulin-dependent : well controlled.   Hold home metformin.  Sugar slightly elevated, increase Lantus to 30 units daily.  Accu checks every 6 hours and c/w humalog coverage as needed.   Last A1c of 6.5.    Essential Hypertension: well controlled.   Continue with Norvasc.  Hold home chlorthalidone, Cozaar and irbesartan.  Restart medications as needed.    Constipation: Resolved.     Primary hypothyroidism: Chronic and stable. Continue synthroid.  COPD: Chronic and stable.  Not on any home medications.  Bronchodilators as needed.  Iron  deficiency anemia: On ferrous sulfate.  Dyslipidemia: Chronic and stable.  Continue with statins.  Breast cancer, s/p right mastectomy: Resume home anastrozole when able to swallow pills.  Chronic lymphocytic leukemia  Falls at home/functional decline  Restless leg syndrome: Continue with Requip.  Anxiety/depression: Continue Zoloft  Severe obstructive sleep apnea: API of 34.5.  Not sure if she uses any home CPAP.  Refused AVAPS in the past.    Code status:   Level Of Support Discussed With: Patient  Code Status (Patient has no pulse and is not breathing): CPR (Attempt to Resuscitate)  Medical Interventions (Patient has pulse or is breathing): Full Support       Nutrition: NPO Diet NPO Type: Strict NPO   Diet, Tube Feeding Tube Feeding Formula: Peptamen AF (Vital AF 1.2); Tube Feeding Type: Continuous; Continuous Tube Feeding Start Rate (mL/hr): 65; Then Advance Rate By (mL/hr): Do Not Advance; Every __ Hours: Patient at Goal Rate; To Goal Rate of...    DVT prophylaxis:  Medical and mechanical DVT prophylaxis orders are present.     Subjective / Review of systems     Review of Systems   Awake, some confusion, does not follow commands consistently.  Discussed with daughter at the bedside, spoke about potential extubation even if mentation is not completely back to herself.  Also explained the small risk of reintubation, given inability to assess her airway secretion clearance at this time.  Daughter acceptable for trial extubation.  Objective / Physical Exam   Vital signs:  Temp: 98.7 °F (37.1 °C)  BP: 122/57  Heart Rate: 73  Resp: 24  SpO2: 97 %  Weight: 77.5 kg (170 lb 13.7 oz)    Admission Weight: Weight: 78.5 kg (173 lb)  Current Weight: Weight: 77.5 kg (170 lb 13.7 oz)    Input/Output in last 24 hours:    Intake/Output Summary (Last 24 hours) at 10/25/2023 1219  Last data filed at 10/25/2023 0800  Gross per 24 hour   Intake 3290 ml   Output 1000 ml   Net 2290 ml      Physical Exam  Vitals and nursing note  reviewed.   Constitutional:       General: She is awake.      Appearance: Normal appearance.      Interventions: She is intubated.   HENT:      Head: Normocephalic and atraumatic.      Mouth/Throat:      Mouth: Mucous membranes are moist.      Comments: ET tube in place  Eyes:      General: No scleral icterus.     Conjunctiva/sclera: Conjunctivae normal.   Cardiovascular:      Rate and Rhythm: Normal rate.      Pulses: Normal pulses.      Heart sounds: No murmur heard.     No gallop.   Pulmonary:      Effort: Pulmonary effort is normal. She is intubated.      Breath sounds: Normal breath sounds. No wheezing or rales.   Abdominal:      General: Bowel sounds are normal.      Palpations: Abdomen is soft.      Tenderness: There is no abdominal tenderness.   Genitourinary:     Comments: Bellamy catheter  Musculoskeletal:      Right lower leg: No edema.      Left lower leg: No edema.   Neurological:      Mental Status: She is alert.      Comments: Spontaneous eye opening, spontaneous movement of all extremities.  Does not follow commands consistently.        Radiology and Labs     Results from last 7 days   Lab Units 10/25/23  0527 10/24/23  0454 10/23/23  0421 10/22/23  0455 10/21/23  0455   WBC 10*3/mm3 18.30* 20.30* 22.80* 18.40* 46.90*   HEMATOCRIT % 24.4* 25.9* 28.1* 27.9* 31.8*   PLATELETS 10*3/mm3 172 203 226 200 269      Results from last 7 days   Lab Units 10/25/23  0527 10/24/23  0307 10/23/23  0421 10/22/23  0455 10/21/23  0455   SODIUM mmol/L 138 143 147* 147* 143   POTASSIUM mmol/L 3.8 4.0 3.9 4.6 4.9   CHLORIDE mmol/L 100 103 105 107 105   CO2 mmol/L 24.0 27.0 27.0 26.0 24.0   BUN mg/dL 108* 93* 88* 73* 66*   CREATININE mg/dL 1.43* 1.67* 1.71* 2.18* 2.32*      Current medications   Scheduled Meds: amLODIPine, 10 mg, Nasogastric, Q24H  enoxaparin, 30 mg, Subcutaneous, Daily  ferrous sulfate, 300 mg, Nasogastric, Once per day on Mon Wed Fri  insulin glargine, 30 Units, Subcutaneous, Daily  insulin lispro, 4-24  Units, Subcutaneous, Q6H  lactulose, 20 g, Nasogastric, Once  lansoprazole, 30 mg, Nasogastric, Q AM  levothyroxine, 100 mcg, Nasogastric, Q AM  meropenem, 1,000 mg, Intravenous, Q12H  rOPINIRole, 0.25 mg, Oral, Nightly  rosuvastatin, 10 mg, Nasogastric, Nightly  senna-docusate sodium, 2 tablet, Nasogastric, BID  sertraline, 150 mg, Nasogastric, Daily  sodium chloride, 10 mL, Intravenous, Q12H  sodium chloride, 10 mL, Intravenous, Q12H      Continuous Infusions: dexmedetomidine, 0.2-1.5 mcg/kg/hr, Last Rate: 0.8 mcg/kg/hr (10/25/23 1111)      Patient continues to be critically ill, needed high complexity decision making and remains at high risk of clinical deterioration or death. I have spent a total of 31 minutes providing critical care services to this patient including but not limited to : review of labs/ microbiology/imaging/medications, serial monitoring of vital signs, adjusting ventilator settings as needed, review of other consultant's notes, monitoring input/output, review of treatment plan with bedside nurse and other treatment team, management of life support and nutrition needs.   Updated daughter at the bedside.    Foreign Bolton MD   Critical Care  10/25/23   12:19 EDT

## 2023-10-25 NOTE — PROGRESS NOTES
Nutrition Services  Patient Name: Diane Guan  YOB: 1940  MRN: [id number]  Admission date: 10/10/2023    PPE Documentation        PPE Worn By Provider Did not enter room for this encounter   PPE Worn By Patient  N/A     PROGRESS NOTE      Encounter Information: Progress note to check on TF. Possible extubation today.        PO Diet: NPO Diet NPO Type: Strict NPO   PO Supplements: None ordered    PO Intake:  NPO       Current nutrition support: Peptamen AF at 65 mL/hour + 75 mL/hour water flush    Nutrition support review: Documented as above per EMR       Labs (reviewed below): Mild hypernatremia - resolved  Hyperglycemia - TF is very CHO-controlled        GI Function:  Last documented BM 10/24 (yesterday)  Residuals WNL       Nutrition Intervention Updates: Continue Peptamen AF at goal rate of 65 mL/hr. Continue FWF.     Monitor for extubation and swallow eval.        Results from last 7 days   Lab Units 10/25/23  0527 10/24/23  0307 10/23/23  0421   SODIUM mmol/L 138 143 147*   POTASSIUM mmol/L 3.8 4.0 3.9   CHLORIDE mmol/L 100 103 105   CO2 mmol/L 24.0 27.0 27.0   BUN mg/dL 108* 93* 88*   CREATININE mg/dL 1.43* 1.67* 1.71*   CALCIUM mg/dL 9.0 9.3 9.9   BILIRUBIN mg/dL <0.2 0.2 0.2   ALK PHOS U/L 118* 127* 163*   ALT (SGPT) U/L 22 24 22   AST (SGOT) U/L 22 30 20   GLUCOSE mg/dL 204* 244* 219*     Results from last 7 days   Lab Units 10/25/23  0527 10/24/23  0454 10/24/23  0307 10/23/23  0421   MAGNESIUM mg/dL 2.1  --  1.9 1.9   PHOSPHORUS mg/dL  --   --  2.8  --    HEMOGLOBIN g/dL 7.7*   < >  --  8.8*   HEMATOCRIT % 24.4*   < >  --  28.1*    < > = values in this interval not displayed.     COVID19   Date Value Ref Range Status   10/10/2023 Not Detected Not Detected - Ref. Range Final     Lab Results   Component Value Date    HGBA1C 6.50 (H) 10/13/2023     RD to follow up per protocol.    Electronically signed by:  Shyanne Llanos RD  10/25/23 11:57 EDT

## 2023-10-25 NOTE — PROGRESS NOTES
"RENAL/KCC:     LOS: 13 days    Patient Care Team:  Asia Queen APRN as PCP - General (Nurse Practitioner)  Asia Queen APRN as Nurse Practitioner (Nurse Practitioner)  Ling Bolden MD as Consulting Physician (Hematology and Oncology)    Chief Complaint:  Acute resp failure    Subjective     Interval History:   Chart reviewed  Extubated this afternoon  Non-oliguric    Objective     Vital Sign Min/Max for last 24 hours  Temp  Min: 98.3 °F (36.8 °C)  Max: 98.8 °F (37.1 °C)   BP  Min: 103/42  Max: 137/56   Pulse  Min: 51  Max: 79   Resp  Min: 22  Max: 30   SpO2  Min: 91 %  Max: 99 %   No data recorded   No data recorded     Flowsheet Rows      Flowsheet Row First Filed Value   Admission Height 162.6 cm (64\") Documented at 10/10/2023 1252   Admission Weight 78.5 kg (173 lb) Documented at 10/10/2023 1252            I/O this shift:  In: 1469 [I.V.:337; Other:600; NG/GT:532]  Out: 550 [Urine:550]  I/O last 3 completed shifts:  In: 5257 [I.V.:1317; Other:2147; NG/GT:1793]  Out: 1725 [Urine:1725]    Physical Exam:  GEN: Awake, sleepy  ENT: +BiPAP mask  NECK: Supple, no JVD  CHEST: Lungs are clear  CV: RRR, no M/G/R, no peripheral edema  ABD: Soft, NT, +BS  SKIN: Warm and Dry  NEURO: CN's intact      WBC WBC   Date Value Ref Range Status   10/25/2023 18.30 (H) 3.40 - 10.80 10*3/mm3 Final   10/24/2023 20.30 (H) 3.40 - 10.80 10*3/mm3 Final   10/23/2023 22.80 (H) 3.40 - 10.80 10*3/mm3 Final      HGB Hemoglobin   Date Value Ref Range Status   10/25/2023 7.7 (L) 12.0 - 15.9 g/dL Final   10/24/2023 8.0 (L) 12.0 - 15.9 g/dL Final   10/23/2023 8.8 (L) 12.0 - 15.9 g/dL Final      HCT Hematocrit   Date Value Ref Range Status   10/25/2023 24.4 (L) 34.0 - 46.6 % Final   10/24/2023 25.9 (L) 34.0 - 46.6 % Final   10/23/2023 28.1 (L) 34.0 - 46.6 % Final      Platlets No results found for: \"LABPLAT\"   MCV MCV   Date Value Ref Range Status   10/25/2023 86.6 79.0 - 97.0 fL Final   10/24/2023 87.7 79.0 - 97.0 fL Final " "  10/23/2023 88.6 79.0 - 97.0 fL Final          Sodium Sodium   Date Value Ref Range Status   10/25/2023 138 136 - 145 mmol/L Final   10/24/2023 143 136 - 145 mmol/L Final   10/23/2023 147 (H) 136 - 145 mmol/L Final      Potassium Potassium   Date Value Ref Range Status   10/25/2023 3.8 3.5 - 5.2 mmol/L Final   10/24/2023 4.0 3.5 - 5.2 mmol/L Final   10/23/2023 3.9 3.5 - 5.2 mmol/L Final      Chloride Chloride   Date Value Ref Range Status   10/25/2023 100 98 - 107 mmol/L Final   10/24/2023 103 98 - 107 mmol/L Final   10/23/2023 105 98 - 107 mmol/L Final      CO2 CO2   Date Value Ref Range Status   10/25/2023 24.0 22.0 - 29.0 mmol/L Final   10/24/2023 27.0 22.0 - 29.0 mmol/L Final   10/23/2023 27.0 22.0 - 29.0 mmol/L Final      BUN BUN   Date Value Ref Range Status   10/25/2023 108 (H) 8 - 23 mg/dL Final   10/24/2023 93 (H) 8 - 23 mg/dL Final   10/23/2023 88 (H) 8 - 23 mg/dL Final      Creatinine Creatinine   Date Value Ref Range Status   10/25/2023 1.43 (H) 0.57 - 1.00 mg/dL Final   10/24/2023 1.67 (H) 0.57 - 1.00 mg/dL Final   10/23/2023 1.71 (H) 0.57 - 1.00 mg/dL Final      Calcium Calcium   Date Value Ref Range Status   10/25/2023 9.0 8.6 - 10.5 mg/dL Final   10/24/2023 9.3 8.6 - 10.5 mg/dL Final   10/23/2023 9.9 8.6 - 10.5 mg/dL Final      PO4 No results found for: \"CAPO4\"   Albumin Albumin   Date Value Ref Range Status   10/25/2023 2.6 (L) 3.5 - 5.2 g/dL Final   10/24/2023 3.1 (L) 3.5 - 5.2 g/dL Final   10/23/2023 3.4 (L) 3.5 - 5.2 g/dL Final      Magnesium Magnesium   Date Value Ref Range Status   10/25/2023 2.1 1.6 - 2.4 mg/dL Final   10/24/2023 1.9 1.6 - 2.4 mg/dL Final   10/23/2023 1.9 1.6 - 2.4 mg/dL Final      Uric Acid No results found for: \"URICACID\"        Results Review:     I reviewed the patient's new clinical results.    amLODIPine, 10 mg, Nasogastric, Q24H  enoxaparin, 30 mg, Subcutaneous, Daily  ferrous sulfate, 300 mg, Nasogastric, Once per day on Mon Wed Fri  insulin glargine, 30 Units, " Subcutaneous, Daily  insulin lispro, 4-24 Units, Subcutaneous, Q6H  lactulose, 20 g, Nasogastric, Once  lansoprazole, 30 mg, Nasogastric, Q AM  levothyroxine, 100 mcg, Nasogastric, Q AM  meropenem, 1,000 mg, Intravenous, Q12H  rOPINIRole, 0.25 mg, Oral, Nightly  rosuvastatin, 10 mg, Nasogastric, Nightly  senna-docusate sodium, 2 tablet, Nasogastric, BID  sertraline, 150 mg, Nasogastric, Daily  sodium chloride, 10 mL, Intravenous, Q12H  sodium chloride, 10 mL, Intravenous, Q12H      dexmedetomidine, 0.2-1.5 mcg/kg/hr, Last Rate: 0.8 mcg/kg/hr (10/25/23 1111)        Medication Review: Reviewed    Assessment & Plan     LEXUS/CKD3  Sepsis  Fluid overload  Hypokalemia - from diuresis  Hypocalcemia  Pulmonary edema  Hypotension    Acute hypoxemic respiratory failure    Mixed anxiety and depressive disorder    Primary osteoarthritis involving multiple joints    Hiatal hernia with gastroesophageal reflux    Mixed hyperlipidemia    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    Acquired hypothyroidism    Type 2 diabetes mellitus with stage 3b chronic kidney disease, without long-term current use of insulin    Insomnia    Overactive bladder    Vitamin B 12 deficiency    Vitamin D deficiency    LIBBY (obstructive sleep apnea)    COPD (chronic obstructive pulmonary disease)    CKD (chronic kidney disease) stage 3, GFR 30-59 ml/min    History of breast cancer    Personal history of CLL (chronic lymphocytic leukemia)    History of cigarette smoking    Fall at home    Rhinovirus infection    Acute respiratory failure    Hyperkalemia    Respiratory failure    Pleural effusion    Plan: Cr improved to 1.4 but azotemia worse.  Check FOBT with rising BUN and drop in Hgb.  D/C Bumex.  Continue free water with TF's.  FC in place for urinary retention.  Will follow.       Frederick Bonds MD  [facility]  10/25/23  14:06 EDT

## 2023-10-25 NOTE — PROGRESS NOTES
Infectious Diseases Progress Note      LOS: 13 days   Patient Care Team:  Asia Queen APRN as PCP - General (Nurse Practitioner)  Asia Queen APRN as Nurse Practitioner (Nurse Practitioner)  Ling Bolden MD as Consulting Physician (Hematology and Oncology)    Chief Complaint: Intubated on the ventilator    Subjective     The patient had no high-grade fever during the last 24 hours.  She remained on ventilator on 50% FiO2.  She is currently on no vasopressors    Review of Systems:   Review of Systems   Unable to perform ROS: Intubated        Objective     Vital Signs  Temp:  [98.3 °F (36.8 °C)-98.8 °F (37.1 °C)] 98.7 °F (37.1 °C)  Heart Rate:  [51-73] 73  Resp:  [22-28] 24  BP: (103-137)/() 122/57  FiO2 (%):  [50 %-60 %] 50 %    Physical Exam:  Physical Exam  Constitutional:       Comments: Intubated and sedated on the ventilator   HENT:      Head: Normocephalic and atraumatic.   Eyes:      Pupils: Pupils are equal, round, and reactive to light.   Cardiovascular:      Rate and Rhythm: Normal rate and regular rhythm.   Pulmonary:      Breath sounds: Rales present.   Abdominal:      General: Abdomen is flat. Bowel sounds are normal.      Palpations: Abdomen is soft.      Comments: NG tube   Genitourinary:     Comments: Bellamy catheter  Musculoskeletal:      Cervical back: Neck supple.      Right lower leg: No edema.      Left lower leg: No edema.   Neurological:      Comments: Sedated          Results Review:    I have reviewed all clinical data, test, lab, and imaging results.     Radiology  XR Chest 1 View    Result Date: 10/25/2023  XR CHEST 1 VW Date of Exam: 10/25/2023 5:53 AM EDT Indication: Hypoxia Comparison: 10/24/2023 Findings: ET tube stable above the jaden. Esophagogastric tube below the diaphragm. Heart size within normal limits for technique. Lungs are mildly hypoinflated. Mild left basilar airspace disease likely atelectasis. Negative for pneumothorax. No significant  effusion. Osseous structures grossly intact. Left upper extremity PICC removed.     Impression: 1. Stable ET tube and esophagogastric tube. 2. Hypoinflated lungs with mild left basilar airspace disease likely atelectasis. 3. No pneumothorax. 4. Left upper extremity PICC removed. Electronically Signed: Pavan Amezquita MD  10/25/2023 7:17 AM EDT  Workstation ID: [id number]     Cardiology    Laboratory    Results from last 7 days   Lab Units 10/25/23  0527 10/24/23  0454 10/23/23  0421 10/22/23  0455 10/21/23  0455 10/20/23  0449 10/19/23  0413   WBC 10*3/mm3 18.30* 20.30* 22.80* 18.40* 46.90* 41.20* 34.20*   HEMOGLOBIN g/dL 7.7* 8.0* 8.8* 8.6* 9.6* 9.7* 9.4*   HEMATOCRIT % 24.4* 25.9* 28.1* 27.9* 31.8* 31.3* 30.4*   PLATELETS 10*3/mm3 172 203 226 200 269 220 195     Results from last 7 days   Lab Units 10/25/23  0527 10/24/23  0307 10/23/23  0421 10/22/23  0455 10/21/23  0455 10/20/23  0449 10/19/23  1204 10/19/23  0413   SODIUM mmol/L 138 143 147* 147* 143 141  --  140   POTASSIUM mmol/L 3.8 4.0 3.9 4.6 4.9 3.7 3.8 3.1*   CHLORIDE mmol/L 100 103 105 107 105 103  --  102   CO2 mmol/L 24.0 27.0 27.0 26.0 24.0 24.0  --  23.0   BUN mg/dL 108* 93* 88* 73* 66* 57*  --  62*   CREATININE mg/dL 1.43* 1.67* 1.71* 2.18* 2.32* 2.14*  --  1.96*   GLUCOSE mg/dL 204* 244* 219* 257* 232* 253*  --  127*   ALBUMIN g/dL 2.6* 3.1* 3.4* 3.3* 3.0* 2.9*  --  2.8*   BILIRUBIN mg/dL <0.2 0.2 0.2 0.2 0.2 0.2  --  0.2   ALK PHOS U/L 118* 127* 163* 155* 182* 203*  --  218*   AST (SGOT) U/L 22 30 20 19 21 44*  --  106*   ALT (SGPT) U/L 22 24 22 24 30 49*  --  63*   CALCIUM mg/dL 9.0 9.3 9.9 9.5 9.0 8.8  --  8.6                 Microbiology   Microbiology Results (last 10 days)       Procedure Component Value - Date/Time    Blood Culture - Blood, Hand, Left [[id number]]  (Normal) Collected: 10/23/23 1612    Lab Status: Preliminary result Specimen: Blood from Hand, Left Updated: 10/24/23 1632     Blood Culture No growth at 24 hours    Blood Culture -  Blood, Arm, Left [[id number]]  (Normal) Collected: 10/23/23 1611    Lab Status: Preliminary result Specimen: Blood from Arm, Left Updated: 10/24/23 1632     Blood Culture No growth at 24 hours    Respiratory Culture - Aspirate, ET Suction [[id number]]  (Abnormal)  (Susceptibility) Collected: 10/23/23 1356    Lab Status: Final result Specimen: Aspirate from ET Suction Updated: 10/25/23 0758     Respiratory Culture Heavy growth (4+) Klebsiella aerogenes      No Normal Respiratory Leanna     Gram Stain Less than 10 Epithelial cells per low power field      Moderate (3+) WBCs seen      Many (4+) Gram negative bacilli      Occasional Gram positive cocci in clusters      Rare (1+) Yeast    Susceptibility        Klebsiella aerogenes      JUAN      Cefepime Susceptible      Ceftazidime Resistant      Ceftriaxone Resistant      Gentamicin Susceptible      Levofloxacin Susceptible      Piperacillin + Tazobactam Resistant      Tetracycline Susceptible      Trimethoprim + Sulfamethoxazole Susceptible                       Susceptibility Comments       Klebsiella aerogenes    Cefazolin sensitivity will not be reported for Enterobacteriaceae in non-urine isolates. If cefazolin is preferred, please call the microbiology lab to request an E-test.  With the exception of urinary-sourced infections, aminoglycosides should not be used as monotherapy.               MRSA Screen, PCR (Inpatient) - Swab, Nares [[id number]]  (Normal) Collected: 10/18/23 1546    Lab Status: Final result Specimen: Swab from Nares Updated: 10/18/23 1727     MRSA PCR No MRSA Detected    Narrative:      The negative predictive value of this diagnostic test is high and should only be used to consider de-escalating anti-MRSA therapy. A positive result may indicate colonization with MRSA and must be correlated clinically.    Blood Culture - Blood, Hand, Right [[id number]]  (Normal) Collected: 10/18/23 1440    Lab Status: Final result Specimen: Blood from Hand, Right  Updated: 10/23/23 1501     Blood Culture No growth at 5 days    Narrative:      Less than seven (7) mL's of blood was collected.  Insufficient quantity may yield false negative results.    Fungus Culture - Wash, Bronchus [[id number]]  (Abnormal) Collected: 10/18/23 1440    Lab Status: Preliminary result Specimen: Wash from Bronchus Updated: 10/23/23 1050     Fungus Culture Candida species    AFB Culture - Wash, Bronchus [[id number]] Collected: 10/18/23 1440    Lab Status: Preliminary result Specimen: Wash from Bronchus Updated: 10/23/23 1515     AFB Culture No AFB isolated at less than 1 week     AFB Stain No acid fast bacilli seen on concentrated smear    Respiratory Culture - Wash, Bronchus [[id number]] Collected: 10/18/23 1440    Lab Status: Final result Specimen: Wash from Bronchus Updated: 10/20/23 0910     Respiratory Culture Light growth (2+) Normal respiratory franci. No S. aureus or Pseudomonas aeruginosa detected. Final report.     Gram Stain Many (4+) WBCs seen      Rare (1+) Gram positive cocci in clusters    Blood Culture - Blood, Hand, Right [[id number]]  (Normal) Collected: 10/18/23 1437    Lab Status: Final result Specimen: Blood from Hand, Right Updated: 10/23/23 1501     Blood Culture No growth at 5 days    Narrative:      Less than seven (7) mL's of blood was collected.  Insufficient quantity may yield false negative results.    Respiratory Culture - Sputum, ET Suction [[id number]] Collected: 10/15/23 1354    Lab Status: Final result Specimen: Sputum from ET Suction Updated: 10/18/23 1030     Respiratory Culture Scant growth (1+) Normal respiratory franci. No S. aureus or Pseudomonas aeruginosa detected. Final report.     Gram Stain Moderate (3+) WBCs per low power field      Few (2+) Gram positive cocci in clusters            Medication Review:       Schedule Meds  amLODIPine, 10 mg, Nasogastric, Q24H  enoxaparin, 30 mg, Subcutaneous, Daily  ferrous sulfate, 300 mg, Nasogastric, Once per day on Mon Wed  Fri  insulin glargine, 30 Units, Subcutaneous, Daily  insulin lispro, 4-24 Units, Subcutaneous, Q6H  lactulose, 20 g, Nasogastric, Once  lansoprazole, 30 mg, Nasogastric, Q AM  levothyroxine, 100 mcg, Nasogastric, Q AM  meropenem, 1,000 mg, Intravenous, Q12H  rOPINIRole, 0.25 mg, Oral, Nightly  rosuvastatin, 10 mg, Nasogastric, Nightly  senna-docusate sodium, 2 tablet, Nasogastric, BID  sertraline, 150 mg, Nasogastric, Daily  sodium chloride, 10 mL, Intravenous, Q12H  sodium chloride, 10 mL, Intravenous, Q12H        Infusion Meds  dexmedetomidine, 0.2-1.5 mcg/kg/hr, Last Rate: 0.8 mcg/kg/hr (10/25/23 1111)        PRN Meds    acetaminophen    cyclobenzaprine    dextrose    dextrose    dextrose    fentaNYL citrate (PF)    glucagon (human recombinant)    hydrALAZINE    ipratropium-albuterol    lidocaine    nitroglycerin    ondansetron    oxyCODONE    polyethylene glycol    [COMPLETED] Insert Peripheral IV **AND** sodium chloride    sodium chloride    sodium chloride    sodium chloride    sodium chloride        Assessment & Plan       Antimicrobial Therapy   1.  IV meropenem       2.        3.        4.        5.            Assessment     Acute respiratory failure.  Most likely secondary to hospital-acquired pneumonia.  CT scan of the chest on October 18 showed bilateral lower lobe infiltrates.  There was no additional findings consistent with PCP pneumonia  The patient is currently on ventilator on 50% FiO2 and 8 PEEP  Patient is s/p bronchoscopy and BAL cultures are negative  MRSA screen was negative     Severe lymphocytosis.  Most likely secondary to underlying CLL.  Patient was not on treatment prior to admission     History of breast cancer s/p mastectomies in the past     UTI with Klebsiella pneumoniae and E. coli.  Organisms were relatively susceptible     Diabetes mellitus type 2     Rhinovirus infection.  Nasal swab was positive for rhinovirus PCR    Mild septic shock-currently off vasopressors    Possible  ventilator associated pneumonia.  Tracheal aspirate culture from October 23, 2023 grew Klebsiella aerogenes.  Based on the susceptibility I am suspecting AmpC  pathogen     Plan     Continue meropenem 1 g IV every 12 hours for 7 days  Continue supportive care  Probable extubation later today  A.carlo Santos MD  10/25/23  12:24 EDT    Note is dictated utilizing voice recognition software/Dragon

## 2023-10-25 NOTE — PLAN OF CARE
Goal Outcome Evaluation:      Pt extubated to AVAPS 100%. Pt has been NSR throughout shift. Pt has been very restless. Pt is confused. Pt had 2 BM. UOP- 800 mL.     Dex gtt on.

## 2023-10-26 ENCOUNTER — APPOINTMENT (OUTPATIENT)
Dept: GENERAL RADIOLOGY | Facility: HOSPITAL | Age: 83
DRG: 207 | End: 2023-10-26
Payer: MEDICARE

## 2023-10-26 LAB
ALBUMIN SERPL-MCNC: 2.8 G/DL (ref 3.5–5.2)
ALBUMIN/GLOB SERPL: 1 G/DL
ALP SERPL-CCNC: 118 U/L (ref 39–117)
ALT SERPL W P-5'-P-CCNC: 22 U/L (ref 1–33)
ANION GAP SERPL CALCULATED.3IONS-SCNC: 12 MMOL/L (ref 5–15)
ANISOCYTOSIS BLD QL: ABNORMAL
ARTERIAL PATENCY WRIST A: POSITIVE
AST SERPL-CCNC: 22 U/L (ref 1–32)
ATMOSPHERIC PRESS: ABNORMAL MM[HG]
BASE EXCESS BLDA CALC-SCNC: 3.5 MMOL/L (ref 0–3)
BDY SITE: ABNORMAL
BILIRUB SERPL-MCNC: 0.2 MG/DL (ref 0–1.2)
BUN SERPL-MCNC: 103 MG/DL (ref 8–23)
BUN/CREAT SERPL: 74.6 (ref 7–25)
CALCIUM SPEC-SCNC: 9 MG/DL (ref 8.6–10.5)
CHLORIDE SERPL-SCNC: 103 MMOL/L (ref 98–107)
CO2 BLDA-SCNC: 27.3 MMOL/L (ref 22–29)
CO2 SERPL-SCNC: 26 MMOL/L (ref 22–29)
CREAT SERPL-MCNC: 1.38 MG/DL (ref 0.57–1)
DEPRECATED RDW RBC AUTO: 46.8 FL (ref 37–54)
EGFRCR SERPLBLD CKD-EPI 2021: 38.3 ML/MIN/1.73
EOSINOPHIL # BLD MANUAL: 0.66 10*3/MM3 (ref 0–0.4)
EOSINOPHIL NFR BLD MANUAL: 3 % (ref 0.3–6.2)
ERYTHROCYTE [DISTWIDTH] IN BLOOD BY AUTOMATED COUNT: 15.4 % (ref 12.3–15.4)
GLOBULIN UR ELPH-MCNC: 2.7 GM/DL
GLUCOSE BLDC GLUCOMTR-MCNC: 122 MG/DL (ref 70–105)
GLUCOSE BLDC GLUCOMTR-MCNC: 179 MG/DL (ref 70–105)
GLUCOSE BLDC GLUCOMTR-MCNC: 179 MG/DL (ref 70–105)
GLUCOSE BLDC GLUCOMTR-MCNC: 189 MG/DL (ref 70–105)
GLUCOSE BLDC GLUCOMTR-MCNC: 70 MG/DL (ref 70–105)
GLUCOSE BLDC GLUCOMTR-MCNC: 75 MG/DL (ref 70–105)
GLUCOSE SERPL-MCNC: 84 MG/DL (ref 65–99)
HCO3 BLDA-SCNC: 26.3 MMOL/L (ref 21–28)
HCT VFR BLD AUTO: 25.7 % (ref 34–46.6)
HEMODILUTION: NO
HGB BLD-MCNC: 7.9 G/DL (ref 12–15.9)
INHALED O2 CONCENTRATION: 100 %
LARGE PLATELETS: ABNORMAL
LYMPHOCYTES # BLD MANUAL: 13.36 10*3/MM3 (ref 0.7–3.1)
LYMPHOCYTES NFR BLD MANUAL: 2 % (ref 5–12)
MAGNESIUM SERPL-MCNC: 2.3 MG/DL (ref 1.6–2.4)
MCH RBC QN AUTO: 26.9 PG (ref 26.6–33)
MCHC RBC AUTO-ENTMCNC: 30.9 G/DL (ref 31.5–35.7)
MCV RBC AUTO: 87.2 FL (ref 79–97)
MODALITY: ABNORMAL
MONOCYTES # BLD: 0.44 10*3/MM3 (ref 0.1–0.9)
NEUTROPHILS # BLD AUTO: 7.45 10*3/MM3 (ref 1.7–7)
NEUTROPHILS NFR BLD MANUAL: 27 % (ref 42.7–76)
NEUTS BAND NFR BLD MANUAL: 7 % (ref 0–5)
PCO2 BLDA: 31.9 MM HG (ref 35–48)
PEEP RESPIRATORY: 5 CM[H2O]
PH BLDA: 7.52 PH UNITS (ref 7.35–7.45)
PLATELET # BLD AUTO: 230 10*3/MM3 (ref 140–450)
PMV BLD AUTO: 9.6 FL (ref 6–12)
PO2 BLDA: 67.3 MM HG (ref 83–108)
POTASSIUM SERPL-SCNC: 3.6 MMOL/L (ref 3.5–5.2)
PROT SERPL-MCNC: 5.5 G/DL (ref 6–8.5)
RBC # BLD AUTO: 2.95 10*6/MM3 (ref 3.77–5.28)
RESPIRATORY RATE: 22
SAO2 % BLDCOA: 95.2 % (ref 94–98)
SCAN SLIDE: NORMAL
SMUDGE CELLS BLD QL SMEAR: ABNORMAL
SODIUM SERPL-SCNC: 141 MMOL/L (ref 136–145)
TRIGL SERPL-MCNC: 223 MG/DL (ref 0–150)
VARIANT LYMPHS NFR BLD MANUAL: 61 % (ref 19.6–45.3)
VENTILATOR MODE: AC
VT ON VENT VENT: 500 ML
WBC NRBC COR # BLD: 21.9 10*3/MM3 (ref 3.4–10.8)

## 2023-10-26 PROCEDURE — 94003 VENT MGMT INPAT SUBQ DAY: CPT

## 2023-10-26 PROCEDURE — 25010000002 MEROPENEM PER 100 MG: Performed by: INTERNAL MEDICINE

## 2023-10-26 PROCEDURE — 85007 BL SMEAR W/DIFF WBC COUNT: CPT | Performed by: NURSE PRACTITIONER

## 2023-10-26 PROCEDURE — 63710000001 INSULIN LISPRO (HUMAN) PER 5 UNITS: Performed by: INTERNAL MEDICINE

## 2023-10-26 PROCEDURE — 84478 ASSAY OF TRIGLYCERIDES: CPT | Performed by: INTERNAL MEDICINE

## 2023-10-26 PROCEDURE — 82948 REAGENT STRIP/BLOOD GLUCOSE: CPT

## 2023-10-26 PROCEDURE — 82803 BLOOD GASES ANY COMBINATION: CPT

## 2023-10-26 PROCEDURE — 71045 X-RAY EXAM CHEST 1 VIEW: CPT

## 2023-10-26 PROCEDURE — 0 MIDAZOLAM 1 MG/ML 100ML NS 100 MG/100ML SOLUTION: Performed by: STUDENT IN AN ORGANIZED HEALTH CARE EDUCATION/TRAINING PROGRAM

## 2023-10-26 PROCEDURE — 83735 ASSAY OF MAGNESIUM: CPT | Performed by: STUDENT IN AN ORGANIZED HEALTH CARE EDUCATION/TRAINING PROGRAM

## 2023-10-26 PROCEDURE — 94761 N-INVAS EAR/PLS OXIMETRY MLT: CPT

## 2023-10-26 PROCEDURE — 94799 UNLISTED PULMONARY SVC/PX: CPT

## 2023-10-26 PROCEDURE — 36600 WITHDRAWAL OF ARTERIAL BLOOD: CPT

## 2023-10-26 PROCEDURE — 85025 COMPLETE CBC W/AUTO DIFF WBC: CPT | Performed by: NURSE PRACTITIONER

## 2023-10-26 PROCEDURE — 80053 COMPREHEN METABOLIC PANEL: CPT | Performed by: STUDENT IN AN ORGANIZED HEALTH CARE EDUCATION/TRAINING PROGRAM

## 2023-10-26 PROCEDURE — 25010000002 PROPOFOL 10 MG/ML EMULSION: Performed by: INTERNAL MEDICINE

## 2023-10-26 RX ORDER — CHLORHEXIDINE GLUCONATE ORAL RINSE 1.2 MG/ML
15 SOLUTION DENTAL EVERY 12 HOURS SCHEDULED
Status: DISCONTINUED | OUTPATIENT
Start: 2023-10-26 | End: 2023-10-27

## 2023-10-26 RX ORDER — ROPINIROLE 0.25 MG/1
0.25 TABLET, FILM COATED ORAL NIGHTLY
Status: DISCONTINUED | OUTPATIENT
Start: 2023-10-26 | End: 2023-11-06 | Stop reason: HOSPADM

## 2023-10-26 RX ADMIN — Medication 10 ML: at 08:02

## 2023-10-26 RX ADMIN — INSULIN LISPRO 4 UNITS: 100 INJECTION, SOLUTION INTRAVENOUS; SUBCUTANEOUS at 17:55

## 2023-10-26 RX ADMIN — INSULIN LISPRO 4 UNITS: 100 INJECTION, SOLUTION INTRAVENOUS; SUBCUTANEOUS at 11:48

## 2023-10-26 RX ADMIN — PROPOFOL INJECTABLE EMULSION 35 MCG/KG/MIN: 10 INJECTION, EMULSION INTRAVENOUS at 21:55

## 2023-10-26 RX ADMIN — OXYCODONE HYDROCHLORIDE 5 MG: 5 TABLET ORAL at 19:36

## 2023-10-26 RX ADMIN — LEVOTHYROXINE SODIUM 100 MCG: 0.1 TABLET ORAL at 05:23

## 2023-10-26 RX ADMIN — PROPOFOL INJECTABLE EMULSION 30 MCG/KG/MIN: 10 INJECTION, EMULSION INTRAVENOUS at 17:29

## 2023-10-26 RX ADMIN — SERTRALINE 150 MG: 100 TABLET, FILM COATED ORAL at 08:02

## 2023-10-26 RX ADMIN — 0.12% CHLORHEXIDINE GLUCONATE 15 ML: 1.2 RINSE ORAL at 19:35

## 2023-10-26 RX ADMIN — SENNOSIDES AND DOCUSATE SODIUM 2 TABLET: 8.6; 5 TABLET ORAL at 08:01

## 2023-10-26 RX ADMIN — DEXTROSE MONOHYDRATE 25 G: 25 INJECTION, SOLUTION INTRAVENOUS at 06:14

## 2023-10-26 RX ADMIN — MEROPENEM 1000 MG: 1 INJECTION, POWDER, FOR SOLUTION INTRAVENOUS at 18:00

## 2023-10-26 RX ADMIN — LANSOPRAZOLE 30 MG: 30 TABLET, ORALLY DISINTEGRATING ORAL at 05:23

## 2023-10-26 RX ADMIN — MEROPENEM 1000 MG: 1 INJECTION, POWDER, FOR SOLUTION INTRAVENOUS at 05:23

## 2023-10-26 RX ADMIN — ROSUVASTATIN 10 MG: 10 TABLET, FILM COATED ORAL at 19:36

## 2023-10-26 RX ADMIN — OXYCODONE HYDROCHLORIDE 5 MG: 5 TABLET ORAL at 05:23

## 2023-10-26 RX ADMIN — 0.12% CHLORHEXIDINE GLUCONATE 15 ML: 1.2 RINSE ORAL at 08:02

## 2023-10-26 RX ADMIN — SENNOSIDES AND DOCUSATE SODIUM 2 TABLET: 8.6; 5 TABLET ORAL at 19:35

## 2023-10-26 RX ADMIN — INSULIN LISPRO 4 UNITS: 100 INJECTION, SOLUTION INTRAVENOUS; SUBCUTANEOUS at 23:01

## 2023-10-26 RX ADMIN — ROPINIROLE HYDROCHLORIDE 0.25 MG: 1 TABLET, FILM COATED ORAL at 19:36

## 2023-10-26 RX ADMIN — Medication 10 ML: at 19:37

## 2023-10-26 RX ADMIN — Medication 150 MCG/HR: at 17:43

## 2023-10-26 RX ADMIN — MIDAZOLAM HYDROCHLORIDE 6 MG/HR: 1 INJECTION, SOLUTION INTRAVENOUS at 06:37

## 2023-10-26 RX ADMIN — PROPOFOL INJECTABLE EMULSION 5 MCG/KG/MIN: 10 INJECTION, EMULSION INTRAVENOUS at 08:02

## 2023-10-26 RX ADMIN — AMLODIPINE BESYLATE 10 MG: 5 TABLET ORAL at 08:02

## 2023-10-26 NOTE — CASE MANAGEMENT/SOCIAL WORK
Continued Stay Note   Hoang     Patient Name: Diane Guan  MRN: [id number]  Today's Date: 10/26/2023    Admit Date: 10/10/2023    Plan: [place] PLAN: Referral to [facility] pending. Requires CATHLEEN mills approved.(VENT)   Discharge Plan       Row Name 10/26/23 1113       Plan    Plan Comments Failed BIPAP, reintubated 10/25, IV antibiotics per ID. Tube feedings.                Chart review only.         Expected Discharge Date and Time       Expected Discharge Date Expected Discharge Time    Oct 28, 2023               Tejal Justin RN

## 2023-10-26 NOTE — SIGNIFICANT NOTE
Spoke with daughter and granddaughter at the bedside.  Had lengthy conversation about goals of care.  Discussed about difficulty in ventilator weaning and possible prolonged ventilator stay.  Discussed about potential need for trach/PEG.  Also discussed about possible GI bleed as well as worsening BUN.  Per daughter, patient would not have wanted to go to nursing home with a trach and PEG.  She also does not think that patient would be compliant with her CPAP after discharge.  She will discuss with her brother and decide on further course.  Remains full code for the time being.    Dr. Foreign Bolton MD MPH  Staff Intensivist  10/26/23   17:43 EDT

## 2023-10-26 NOTE — SIGNIFICANT NOTE
Entered pt. Room to pt pulling avaps mask off d/t small amt vomiting. Cleaned pt and gave fentanyl and zofran. ~ 1955- pt becamevery dyspneic, diaphoretic and audible crackles throughout all lung fields. NP called to room and immediatley intubated. New og placed and to lws, not much return. Versed, fentanyl and precedex infusing. vss

## 2023-10-26 NOTE — NURSING NOTE
Called daughter Marianna Mantilla and updated her w/ the events of the shift. All questions answered.

## 2023-10-26 NOTE — PROCEDURES
"Intubation    Date/Time: 10/25/2023 11:17 PM    Performed by: Ramona Wilder APRN  Authorized by: Ramona Wilder APRN  Consent: The procedure was performed in an emergent situation.  Required items: required blood products, implants, devices, and special equipment available  Patient identity confirmed: arm band, provided demographic data and hospital-assigned identification number  Time out: Immediately prior to procedure a \"time out\" was called to verify the correct patient, procedure, equipment, support staff and site/side marked as required.  Indications: respiratory failure  Intubation method: glidescope.  Preoxygenation: BVM  Sedatives: fentanyl, midazolam and etomidate  Laryngoscope size: Mac 3  Tube size: 7.5 mm  Tube type: cuffed  Number of attempts: 1  Ventilation between attempts: BVM  Cricoid pressure: no  Cords visualized: yes  Post-procedure assessment: chest rise and CO2 detector  Breath sounds: equal and absent over the epigastrium  Cuff inflated: yes  ETT to lip: 22 cm  Tube secured with: ETT pompa  Chest x-ray interpreted by radiologist.  Chest x-ray findings: endotracheal tube in appropriate position  Patient tolerance: patient tolerated the procedure well with no immediate complications      Electronically signed by SIOMARA Harper, 10/26/23, 12:15 AM EDT.    "

## 2023-10-26 NOTE — PROGRESS NOTES
Infectious Diseases Progress Note      LOS: 14 days   Patient Care Team:  Asia Queen APRN as PCP - General (Nurse Practitioner)  Aisa Queen APRN as Nurse Practitioner (Nurse Practitioner)  Ling Bolden MD as Consulting Physician (Hematology and Oncology)    Chief Complaint: Intubated on the ventilator    Subjective     The patient had no high-grade fever during the last 24 hours.  The patient was extubated yesterday and required to be in reintubated.  Currently on 100% FiO2.  Currently on no vasopressors    Review of Systems:   Review of Systems   Unable to perform ROS: Intubated        Objective     Vital Signs  Temp:  [97.5 °F (36.4 °C)-99.1 °F (37.3 °C)] 98.4 °F (36.9 °C)  Heart Rate:  [48-86] 63  Resp:  [18-22] 18  BP: ()/() 112/53  FiO2 (%):  [100 %] 100 %    Physical Exam:  Physical Exam  Constitutional:       Comments: Intubated and sedated on the ventilator   HENT:      Head: Normocephalic and atraumatic.   Eyes:      Pupils: Pupils are equal, round, and reactive to light.   Cardiovascular:      Rate and Rhythm: Normal rate and regular rhythm.   Pulmonary:      Breath sounds: Rales present.   Abdominal:      General: Abdomen is flat. Bowel sounds are normal.      Palpations: Abdomen is soft.      Comments: NG tube   Genitourinary:     Comments: Bellamy catheter  Musculoskeletal:      Cervical back: Neck supple.      Right lower leg: No edema.      Left lower leg: No edema.   Neurological:      Comments: Sedated          Results Review:    I have reviewed all clinical data, test, lab, and imaging results.     Radiology  XR Chest 1 View    Result Date: 10/26/2023  XR CHEST 1 VW Date of Exam: 10/26/2023 4:16 AM EDT Indication: Hypoxia Comparison: 10/25/2023 Findings: Endotracheal tube with the tip at the level of the clavicles. There is an NG tube with tip in the stomach. There is stable appearance of the cardiopulmonary silhouette. Nodular change left lower lobe airspace  disease with pleural effusion. The right lung  is clear.     Impression: Stable left lower lobe airspace disease with a left pleural effusion Life-support structures as above Electronically Signed: Dwight Arredondo MD  10/26/2023 7:36 AM EDT  Workstation ID: [id number]    XR Chest 1 View    Result Date: 10/25/2023  XR CHEST 1 VW Date of Exam: 10/25/2023 8:39 PM EDT Indication: intubation, ett placement Comparison:  10/25/2023 Findings: Interval retraction of endotracheal tube which now terminates in the upper thoracic trachea. Nasogastric tube terminates within the gastric fundus. Cardiomegaly. Left basilar opacity with trace left pleural effusion. No pneumothorax. No evidence of acute osseous abnormalities. Visualized upper abdomen is unremarkable.     Impression: Endotracheal tube terminates in the upper thoracic trachea. Left basilar opacity with trace left pleural effusion. This may represent atelectasis or infection. Electronically Signed: Brant Aguirre MD  10/25/2023 8:53 PM EDT  Workstation ID: [id number]     Cardiology    Laboratory    Results from last 7 days   Lab Units 10/26/23  0310 10/25/23  0527 10/24/23  0454 10/23/23  0421 10/22/23  0455 10/21/23  0455 10/20/23  0449   WBC 10*3/mm3 21.90* 18.30* 20.30* 22.80* 18.40* 46.90* 41.20*   HEMOGLOBIN g/dL 7.9* 7.7* 8.0* 8.8* 8.6* 9.6* 9.7*   HEMATOCRIT % 25.7* 24.4* 25.9* 28.1* 27.9* 31.8* 31.3*   PLATELETS 10*3/mm3 230 172 203 226 200 269 220     Results from last 7 days   Lab Units 10/26/23  0310 10/25/23  0527 10/24/23  0307 10/23/23  0421 10/22/23  0455 10/21/23  0455 10/20/23  0449   SODIUM mmol/L 141 138 143 147* 147* 143 141   POTASSIUM mmol/L 3.6 3.8 4.0 3.9 4.6 4.9 3.7   CHLORIDE mmol/L 103 100 103 105 107 105 103   CO2 mmol/L 26.0 24.0 27.0 27.0 26.0 24.0 24.0   BUN mg/dL 103* 108* 93* 88* 73* 66* 57*   CREATININE mg/dL 1.38* 1.43* 1.67* 1.71* 2.18* 2.32* 2.14*   GLUCOSE mg/dL 84 204* 244* 219* 257* 232* 253*   ALBUMIN g/dL 2.8* 2.6* 3.1* 3.4* 3.3*  3.0* 2.9*   BILIRUBIN mg/dL 0.2 <0.2 0.2 0.2 0.2 0.2 0.2   ALK PHOS U/L 118* 118* 127* 163* 155* 182* 203*   AST (SGOT) U/L 22 22 30 20 19 21 44*   ALT (SGPT) U/L 22 22 24 22 24 30 49*   CALCIUM mg/dL 9.0 9.0 9.3 9.9 9.5 9.0 8.8                 Microbiology   Microbiology Results (last 10 days)       Procedure Component Value - Date/Time    Blood Culture - Blood, Hand, Left [[id number]]  (Normal) Collected: 10/23/23 1612    Lab Status: Preliminary result Specimen: Blood from Hand, Left Updated: 10/25/23 1632     Blood Culture No growth at 2 days    Blood Culture - Blood, Arm, Left [[id number]]  (Normal) Collected: 10/23/23 1611    Lab Status: Preliminary result Specimen: Blood from Arm, Left Updated: 10/25/23 1632     Blood Culture No growth at 2 days    Respiratory Culture - Aspirate, ET Suction [[id number]]  (Abnormal)  (Susceptibility) Collected: 10/23/23 1356    Lab Status: Final result Specimen: Aspirate from ET Suction Updated: 10/25/23 0759     Respiratory Culture Heavy growth (4+) Klebsiella aerogenes      No Normal Respiratory Leanna     Gram Stain Less than 10 Epithelial cells per low power field      Moderate (3+) WBCs seen      Many (4+) Gram negative bacilli      Occasional Gram positive cocci in clusters      Rare (1+) Yeast    Susceptibility        Klebsiella aerogenes      JUAN      Cefepime Susceptible      Ceftazidime Resistant      Ceftriaxone Resistant      Gentamicin Susceptible      Levofloxacin Susceptible      Piperacillin + Tazobactam Resistant      Tetracycline Susceptible      Trimethoprim + Sulfamethoxazole Susceptible                       Susceptibility Comments       Klebsiella aerogenes    Cefazolin sensitivity will not be reported for Enterobacteriaceae in non-urine isolates. If cefazolin is preferred, please call the microbiology lab to request an E-test.  With the exception of urinary-sourced infections, aminoglycosides should not be used as monotherapy.               MRSA Screen, PCR  (Inpatient) - Swab, Nares [[id number]]  (Normal) Collected: 10/18/23 1546    Lab Status: Final result Specimen: Swab from Nares Updated: 10/18/23 1727     MRSA PCR No MRSA Detected    Narrative:      The negative predictive value of this diagnostic test is high and should only be used to consider de-escalating anti-MRSA therapy. A positive result may indicate colonization with MRSA and must be correlated clinically.    Blood Culture - Blood, Hand, Right [[id number]]  (Normal) Collected: 10/18/23 1440    Lab Status: Final result Specimen: Blood from Hand, Right Updated: 10/23/23 1501     Blood Culture No growth at 5 days    Narrative:      Less than seven (7) mL's of blood was collected.  Insufficient quantity may yield false negative results.    Fungus Culture - Wash, Bronchus [[id number]]  (Abnormal) Collected: 10/18/23 1440    Lab Status: Preliminary result Specimen: Wash from Bronchus Updated: 10/23/23 1050     Fungus Culture Candida species    AFB Culture - Wash, Bronchus [[id number]] Collected: 10/18/23 1440    Lab Status: Preliminary result Specimen: Wash from Bronchus Updated: 10/25/23 1515     AFB Culture No AFB isolated at 1 week     AFB Stain No acid fast bacilli seen on concentrated smear    Respiratory Culture - Wash, Bronchus [[id number]] Collected: 10/18/23 1440    Lab Status: Final result Specimen: Wash from Bronchus Updated: 10/20/23 0910     Respiratory Culture Light growth (2+) Normal respiratory franci. No S. aureus or Pseudomonas aeruginosa detected. Final report.     Gram Stain Many (4+) WBCs seen      Rare (1+) Gram positive cocci in clusters    Blood Culture - Blood, Hand, Right [[id number]]  (Normal) Collected: 10/18/23 1437    Lab Status: Final result Specimen: Blood from Hand, Right Updated: 10/23/23 1501     Blood Culture No growth at 5 days    Narrative:      Less than seven (7) mL's of blood was collected.  Insufficient quantity may yield false negative results.            Medication Review:        Schedule Meds  [Held by provider] amLODIPine, 10 mg, Nasogastric, Q24H  chlorhexidine, 15 mL, Mouth/Throat, Q12H  ferrous sulfate, 300 mg, Nasogastric, Once per day on Mon Wed Fri  [Held by provider] insulin glargine, 30 Units, Subcutaneous, Daily  insulin lispro, 4-24 Units, Subcutaneous, Q6H  lactulose, 20 g, Nasogastric, Once  lansoprazole, 30 mg, Nasogastric, Q AM  levothyroxine, 100 mcg, Nasogastric, Q AM  meropenem, 1,000 mg, Intravenous, Q12H  rOPINIRole, 0.25 mg, Nasogastric, Nightly  rosuvastatin, 10 mg, Nasogastric, Nightly  senna-docusate sodium, 2 tablet, Nasogastric, BID  sertraline, 150 mg, Nasogastric, Daily  sodium chloride, 10 mL, Intravenous, Q12H  sodium chloride, 10 mL, Intravenous, Q12H        Infusion Meds  fentanyl 10 mcg/mL,  mcg/hr, Last Rate: 125 mcg/hr (10/26/23 1011)  propofol, 5-50 mcg/kg/min, Last Rate: 20 mcg/kg/min (10/26/23 1214)        PRN Meds    acetaminophen    cyclobenzaprine    dextrose    dextrose    dextrose    fentaNYL citrate (PF)    fentaNYL citrate (PF)    glucagon (human recombinant)    hydrALAZINE    ipratropium-albuterol    lidocaine    nitroglycerin    ondansetron    oxyCODONE    polyethylene glycol    [COMPLETED] Insert Peripheral IV **AND** sodium chloride    sodium chloride    sodium chloride    sodium chloride    sodium chloride        Assessment & Plan       Antimicrobial Therapy   1.  IV meropenem       2.        3.        4.        5.            Assessment     Acute respiratory failure.  Most likely secondary to hospital-acquired pneumonia.  CT scan of the chest on October 18 showed bilateral lower lobe infiltrates.  There was no additional findings consistent with PCP pneumonia  The patient is currently on ventilator on 100% FiO2  Patient is s/p bronchoscopy and BAL cultures are negative  MRSA screen was negative     Severe lymphocytosis.  Most likely secondary to underlying CLL.  Patient was not on treatment prior to admission     History of  breast cancer s/p mastectomies in the past     UTI with Klebsiella pneumoniae and E. coli.  Organisms were relatively susceptible     Diabetes mellitus type 2     Rhinovirus infection.  Nasal swab was positive for rhinovirus PCR    Mild septic shock-currently off vasopressors    Possible ventilator associated pneumonia.  Tracheal aspirate culture from October 23, 2023 grew Klebsiella aerogenes.  Based on the susceptibility I am suspecting AmpC  pathogen     Plan     Continue meropenem 1 g IV every 12 hours for 7 days  Continue supportive care  Case was discussed with the patient's family at bedside  A.m. swati Santos MD  10/26/23  14:13 EDT    Note is dictated utilizing voice recognition software/Dragon

## 2023-10-26 NOTE — PROGRESS NOTES
"RENAL/KCC:     LOS: 14 days    Patient Care Team:  Asia Queen APRN as PCP - General (Nurse Practitioner)  Asia Queen APRN as Nurse Practitioner (Nurse Practitioner)  Ling Bolden MD as Consulting Physician (Hematology and Oncology)    Chief Complaint:  Acute resp failure    Subjective     Interval History:   Chart reviewed  Re-intubated overnight  Non-oliguric    Objective     Vital Sign Min/Max for last 24 hours  Temp  Min: 97.5 °F (36.4 °C)  Max: 99.1 °F (37.3 °C)   BP  Min: 79/34  Max: 178/61   Pulse  Min: 48  Max: 86   Resp  Min: 18  Max: 30   SpO2  Min: 74 %  Max: 97 %   No data recorded   Weight  Min: 78.3 kg (172 lb 9.9 oz)  Max: 78.3 kg (172 lb 9.9 oz)     Flowsheet Rows      Flowsheet Row First Filed Value   Admission Height 162.6 cm (64\") Documented at 10/10/2023 1252   Admission Weight 78.5 kg (173 lb) Documented at 10/10/2023 1252            No intake/output data recorded.  I/O last 3 completed shifts:  In: 3838 [I.V.:1343; Other:1282; NG/GT:1213]  Out: 2500 [Urine:2500]    Physical Exam:  GEN: Sedated, intubated  ENT: +ETT  NECK: Supple, no JVD  CHEST: Lungs are clear  CV: RRR, no M/G/R, no peripheral edema  ABD: Soft, NT, +BS  SKIN: Warm and Dry  NEURO: Sedated      WBC WBC   Date Value Ref Range Status   10/26/2023 21.90 (H) 3.40 - 10.80 10*3/mm3 Final   10/25/2023 18.30 (H) 3.40 - 10.80 10*3/mm3 Final   10/24/2023 20.30 (H) 3.40 - 10.80 10*3/mm3 Final      HGB Hemoglobin   Date Value Ref Range Status   10/26/2023 7.9 (L) 12.0 - 15.9 g/dL Final   10/25/2023 7.7 (L) 12.0 - 15.9 g/dL Final   10/24/2023 8.0 (L) 12.0 - 15.9 g/dL Final      HCT Hematocrit   Date Value Ref Range Status   10/26/2023 25.7 (L) 34.0 - 46.6 % Final   10/25/2023 24.4 (L) 34.0 - 46.6 % Final   10/24/2023 25.9 (L) 34.0 - 46.6 % Final      Platlets No results found for: \"LABPLAT\"   MCV MCV   Date Value Ref Range Status   10/26/2023 87.2 79.0 - 97.0 fL Final   10/25/2023 86.6 79.0 - 97.0 fL Final   10/24/2023 " "87.7 79.0 - 97.0 fL Final          Sodium Sodium   Date Value Ref Range Status   10/26/2023 141 136 - 145 mmol/L Final   10/25/2023 138 136 - 145 mmol/L Final   10/24/2023 143 136 - 145 mmol/L Final      Potassium Potassium   Date Value Ref Range Status   10/26/2023 3.6 3.5 - 5.2 mmol/L Final     Comment:     Slight hemolysis detected by analyzer. Results may be affected.   10/25/2023 3.8 3.5 - 5.2 mmol/L Final   10/24/2023 4.0 3.5 - 5.2 mmol/L Final      Chloride Chloride   Date Value Ref Range Status   10/26/2023 103 98 - 107 mmol/L Final   10/25/2023 100 98 - 107 mmol/L Final   10/24/2023 103 98 - 107 mmol/L Final      CO2 CO2   Date Value Ref Range Status   10/26/2023 26.0 22.0 - 29.0 mmol/L Final   10/25/2023 24.0 22.0 - 29.0 mmol/L Final   10/24/2023 27.0 22.0 - 29.0 mmol/L Final      BUN BUN   Date Value Ref Range Status   10/26/2023 103 (H) 8 - 23 mg/dL Final   10/25/2023 108 (H) 8 - 23 mg/dL Final   10/24/2023 93 (H) 8 - 23 mg/dL Final      Creatinine Creatinine   Date Value Ref Range Status   10/26/2023 1.38 (H) 0.57 - 1.00 mg/dL Final   10/25/2023 1.43 (H) 0.57 - 1.00 mg/dL Final   10/24/2023 1.67 (H) 0.57 - 1.00 mg/dL Final      Calcium Calcium   Date Value Ref Range Status   10/26/2023 9.0 8.6 - 10.5 mg/dL Final   10/25/2023 9.0 8.6 - 10.5 mg/dL Final   10/24/2023 9.3 8.6 - 10.5 mg/dL Final      PO4 No results found for: \"CAPO4\"   Albumin Albumin   Date Value Ref Range Status   10/26/2023 2.8 (L) 3.5 - 5.2 g/dL Final   10/25/2023 2.6 (L) 3.5 - 5.2 g/dL Final   10/24/2023 3.1 (L) 3.5 - 5.2 g/dL Final      Magnesium Magnesium   Date Value Ref Range Status   10/26/2023 2.3 1.6 - 2.4 mg/dL Final   10/25/2023 2.1 1.6 - 2.4 mg/dL Final   10/24/2023 1.9 1.6 - 2.4 mg/dL Final      Uric Acid No results found for: \"URICACID\"        Results Review:     I reviewed the patient's new clinical results.    amLODIPine, 10 mg, Nasogastric, Q24H  chlorhexidine, 15 mL, Mouth/Throat, Q12H  ferrous sulfate, 300 mg, " Nasogastric, Once per day on Mon Wed Fri  [Held by provider] insulin glargine, 30 Units, Subcutaneous, Daily  insulin lispro, 4-24 Units, Subcutaneous, Q6H  lactulose, 20 g, Nasogastric, Once  lansoprazole, 30 mg, Nasogastric, Q AM  levothyroxine, 100 mcg, Nasogastric, Q AM  meropenem, 1,000 mg, Intravenous, Q12H  rOPINIRole, 0.25 mg, Oral, Nightly  rosuvastatin, 10 mg, Nasogastric, Nightly  senna-docusate sodium, 2 tablet, Nasogastric, BID  sertraline, 150 mg, Nasogastric, Daily  sodium chloride, 10 mL, Intravenous, Q12H  sodium chloride, 10 mL, Intravenous, Q12H      fentanyl 10 mcg/mL,  mcg/hr, Last Rate: 150 mcg/hr (10/26/23 6056)  propofol, 5-50 mcg/kg/min, Last Rate: 15 mcg/kg/min (10/26/23 2290)        Medication Review: Reviewed    Assessment & Plan     LEXUS/CKD3  Sepsis  Fluid overload  Hypokalemia - from diuresis  Hypocalcemia  Pulmonary edema  Hypotension    Acute hypoxemic respiratory failure    Mixed anxiety and depressive disorder    Primary osteoarthritis involving multiple joints    Hiatal hernia with gastroesophageal reflux    Mixed hyperlipidemia    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    Acquired hypothyroidism    Type 2 diabetes mellitus with stage 3b chronic kidney disease, without long-term current use of insulin    Insomnia    Overactive bladder    Vitamin B 12 deficiency    Vitamin D deficiency    LIBBY (obstructive sleep apnea)    COPD (chronic obstructive pulmonary disease)    CKD (chronic kidney disease) stage 3, GFR 30-59 ml/min    History of breast cancer    Personal history of CLL (chronic lymphocytic leukemia)    History of cigarette smoking    Fall at home    Rhinovirus infection    Acute respiratory failure    Hyperkalemia    Respiratory failure    Pleural effusion    Plan: Cr improved to 1.38. Significant azotemia likely from GI bleed.  Continue to monitor off diuretics.  Continue free water with TF's.  FC in place for urinary retention.  Vent per Pulm.  Will  follow.       Frederick Bonds MD  [facility]  10/26/23  09:07 EDT

## 2023-10-26 NOTE — NURSING NOTE
Pt. Reintubated @2000 w/o difficulty. Reinserted O-G tube. Restarted fentanyl and versed drips and weaned off precedex. Also restarted tf's. Am abg's w/ pa02@67.3, fio2 remains at 100% and peep inc. To 7

## 2023-10-26 NOTE — PROGRESS NOTES
Nutrition Services  Patient Name: Diane Guan  YOB: 1940  MRN: [id number]  Admission date: 10/10/2023    PPE Documentation        PPE Worn By Provider Did not enter room for this encounter   PPE Worn By Patient  N/A     PROGRESS NOTE      Encounter Information: Progress note to check on TF.  Extubated 10/25.  Re-intubated overnight.  OG placed to LWS and then TF restarted this AM.  Patient discussed in AM rounds.  MD to consult palliative care.  On propofol at 2.35 mL/hour with 62 kcal per day.  On fentanyl.         PO Diet: NPO Diet NPO Type: Strict NPO   PO Supplements: None ordered    PO Intake:  NPO       Current nutrition support: Peptamen AF at 65 mL/hour + 75 mL/hour water flush    Nutrition support review: Documented as above per EMR       Labs (reviewed below): Reviewed, management per attending        GI Function:  Last documented BM 10/25 (yesterday)  Residuals WNL       Nutrition Intervention Updates: Continue Peptamen AF at goal rate of 65 mL/hr. Continue FWF.        Results from last 7 days   Lab Units 10/26/23  0310 10/25/23  0527 10/24/23  0307   SODIUM mmol/L 141 138 143   POTASSIUM mmol/L 3.6 3.8 4.0   CHLORIDE mmol/L 103 100 103   CO2 mmol/L 26.0 24.0 27.0   BUN mg/dL 103* 108* 93*   CREATININE mg/dL 1.38* 1.43* 1.67*   CALCIUM mg/dL 9.0 9.0 9.3   BILIRUBIN mg/dL 0.2 <0.2 0.2   ALK PHOS U/L 118* 118* 127*   ALT (SGPT) U/L 22 22 24   AST (SGOT) U/L 22 22 30   GLUCOSE mg/dL 84 204* 244*     Results from last 7 days   Lab Units 10/26/23  0310 10/25/23  0527 10/24/23  0454 10/24/23  0307   MAGNESIUM mg/dL 2.3 2.1  --  1.9   PHOSPHORUS mg/dL  --   --   --  2.8   HEMOGLOBIN g/dL 7.9* 7.7*   < >  --    HEMATOCRIT % 25.7* 24.4*   < >  --     < > = values in this interval not displayed.     COVID19   Date Value Ref Range Status   10/10/2023 Not Detected Not Detected - Ref. Range Final     Lab Results   Component Value Date    HGBA1C 6.50 (H) 10/13/2023     RD to follow up per  protocol.    Electronically signed by:  Vernell Gil RD  10/26/23 08:16 EDT

## 2023-10-26 NOTE — PROGRESS NOTES
Critical Care Progress Note   Diane Guan : 1940 MRN:[id number] LOS:14     Principal Problem: Acute hypoxemic respiratory failure     Reason for follow up: All the medical problems listed below    Summary     A 83 y.o. female with PMH of diabetes, hypertension, breast cancer presented to the hospital after a fall and was admitted with a principal diagnosis of Acute hypoxemic respiratory failure.  On admission, patient required 2 L nasal cannula and subsequently had worsening oxygenation with hypercapnic respiratory failure.  Failed BiPAP and subsequently was intubated on 10/15.  Initially treated with broad-spectrum antibiotics and ID was consulted.  RVP positive for rhinovirus, sputum cultures were negative.  Treated with diuretics for fluid overload.  Developed profound hypoxia and needed deep sedation.  Had bronch on 10/18.  Oxygenation improved with aggressive diuretics.  Subsequently, extubated on 10/25 but developed hypoxia afterwards, failed BiPAP and had to be reintubated.    Also had LEXUS on admission, nephrology was consulted.  Renal ultrasound with no obstruction.  Treated with diuretics.  Subsequently, urine cultures grew both E. coli and Klebsiella pneumonia.  Treated with Zosyn for 7 days.  During the hospital course, patient also developed elevated liver enzymes.  Gallbladder ultrasound showed dilated common bile duct measuring 14 mm with distended gallbladder and sludge.  GI was consulted and recommended conservative management.  Liver enzymes subsequently normalized.    Significant events     10/26/23 : Wean off Versed, can use Precedex.  Hold Lantus.  Awaiting daughter to come for goals of care discussion.  If family desires aggressive care, will get a CT chest without IV contrast.    Assessment / Plan     Acute respiratory failure with hypoxia and hypercapnia / ARDS:   Due to pneumonia and fluid overload with pulmonary edema.  Possible contribution from rhinovirus infection with  resultant ARDS.  Failed extubation and had to be reintubated on 10/25.  Likely due to a combination of COPD, sleep apnea, deconditioning and ARDS.  Ventilator settings noted and adjusted as needed.  Currently needing pretty high FiO2 requirements.  If family desires aggressive care, will get a CT chest.  Titrate sedation/analgesia for RASS score of 0 to -1.     Septic shock due to UTI / E. coli and Klebsiella bacteremia  RVP: +Rhinovirus  BAL cultures were negative.  Sputum cultures from 10/23 with Klebsiella.  ID following, currently on meropenem.  Shock state has resolved.     Acute on chronic heart failure with preserved EF:   Currently well compensated.  Last ECHO showed an EF of 70% with indeterminate LV diastolic function.   Diuretics on hold.  Monitor Input/Output very closely. Follow daily weights.   Net IO Since Admission: 7,304.95 mL [10/26/23 1253]    Transaminitis with dilated common bile duct  Gallbladder ultrasound with dilated CBD, measuring 14 mm with gall sludge.  GI following.  Liver enzymes normalized.     Acute toxic / metabolic encephalopathy  CT head on 10/14 with no acute pathology.  Likely due to residual effects of deep sedation for multiple days.  Slowly improving mentation.     Acute kidney injury on CKD stage III  Remains non oliguric.  Baseline creatinine of 1.3.  Likely ATN from septic shock.  Nephrology following  Has indwelling Bellamy catheter due to acute urinary retention.  We will address it after extubation.  Monitor Input/Output very closely.   Net IO Since Admission: 7,304.95 mL [10/26/23 1253]     Diabetes mellitus Type 2, not insulin-dependent : well controlled.   Hold home metformin.  Some hypoglycemia noted, hold Lantus.  Accu checks every 6 hours and c/w humalog coverage as needed.   Last A1c of 6.5.    Essential Hypertension: well controlled.   Hold home Norvasc, chlorthalidone, Cozaar and irbesartan.  Restart medications as needed.    Iron deficiency anemia: On ferrous  sulfate.  Some drop in hemoglobin with FOBT positive.  No gross bleeding, no need for emergent EGD/colonoscopy at this time     Primary hypothyroidism: Chronic and stable. Continue synthroid.  COPD: Chronic and stable.  Not on any home medications.  Bronchodilators as needed.    Dyslipidemia: Chronic and stable.  Continue with statins.  Breast cancer, s/p right mastectomy: Resume home anastrozole when able to swallow pills.  Chronic lymphocytic leukemia  Falls at home/functional decline  Restless leg syndrome: Continue with Requip.  Anxiety/depression: Continue Zoloft  Severe obstructive sleep apnea: API of 34.5.  Not sure if she uses any home CPAP.  Refused AVAPS in the past.    Code status:   Level Of Support Discussed With: Patient  Code Status (Patient has no pulse and is not breathing): CPR (Attempt to Resuscitate)  Medical Interventions (Patient has pulse or is breathing): Full Support       Nutrition: NPO Diet NPO Type: Strict NPO   Diet, Tube Feeding Tube Feeding Formula: Peptamen AF (Vital AF 1.2); Tube Feeding Type: Continuous; Continuous Tube Feeding Start Rate (mL/hr): 65; Then Advance Rate By (mL/hr): Do Not Advance; Every __ Hours: Patient at Goal Rate; To Goal Rate of...    DVT prophylaxis:  Mechanical DVT prophylaxis orders are present.     Subjective / Review of systems     Review of Systems   Intubated and sedated.  Objective / Physical Exam   Vital signs:  Temp: 98.4 °F (36.9 °C)  BP: 128/58  Heart Rate: 73  Resp: 18  SpO2: 98 %  Weight: 78.3 kg (172 lb 9.9 oz)    Admission Weight: Weight: 78.5 kg (173 lb)  Current Weight: Weight: 78.3 kg (172 lb 9.9 oz)    Input/Output in last 24 hours:    Intake/Output Summary (Last 24 hours) at 10/26/2023 1253  Last data filed at 10/26/2023 0310  Gross per 24 hour   Intake 596 ml   Output 1500 ml   Net -904 ml      Physical Exam  Vitals and nursing note reviewed.   Constitutional:       Appearance: Normal appearance.      Interventions: She is sedated and  intubated.   HENT:      Head: Normocephalic and atraumatic.      Mouth/Throat:      Mouth: Mucous membranes are moist.      Comments: ET tube in place  Eyes:      General: No scleral icterus.     Conjunctiva/sclera: Conjunctivae normal.   Cardiovascular:      Rate and Rhythm: Normal rate.      Pulses: Normal pulses.      Heart sounds: No murmur heard.     No gallop.   Pulmonary:      Effort: Pulmonary effort is normal. She is intubated.      Breath sounds: Rales (Bilateral, scattered) present. No wheezing.   Abdominal:      General: Bowel sounds are normal.      Palpations: Abdomen is soft.      Tenderness: There is no abdominal tenderness.   Genitourinary:     Comments: Bellamy catheter  Musculoskeletal:      Right lower leg: No edema.      Left lower leg: No edema.   Neurological:      Comments: Withdraws to stimuli.        Radiology and Labs     Results from last 7 days   Lab Units 10/26/23  0310 10/25/23  0527 10/24/23  0454 10/23/23  0421 10/22/23  0455   WBC 10*3/mm3 21.90* 18.30* 20.30* 22.80* 18.40*   HEMATOCRIT % 25.7* 24.4* 25.9* 28.1* 27.9*   PLATELETS 10*3/mm3 230 172 203 226 200      Results from last 7 days   Lab Units 10/26/23  0310 10/25/23  0527 10/24/23  0307 10/23/23  0421 10/22/23  0455   SODIUM mmol/L 141 138 143 147* 147*   POTASSIUM mmol/L 3.6 3.8 4.0 3.9 4.6   CHLORIDE mmol/L 103 100 103 105 107   CO2 mmol/L 26.0 24.0 27.0 27.0 26.0   BUN mg/dL 103* 108* 93* 88* 73*   CREATININE mg/dL 1.38* 1.43* 1.67* 1.71* 2.18*      Current medications   Scheduled Meds: [Held by provider] amLODIPine, 10 mg, Nasogastric, Q24H  chlorhexidine, 15 mL, Mouth/Throat, Q12H  ferrous sulfate, 300 mg, Nasogastric, Once per day on Mon Wed Fri  [Held by provider] insulin glargine, 30 Units, Subcutaneous, Daily  insulin lispro, 4-24 Units, Subcutaneous, Q6H  lactulose, 20 g, Nasogastric, Once  lansoprazole, 30 mg, Nasogastric, Q AM  levothyroxine, 100 mcg, Nasogastric, Q AM  meropenem, 1,000 mg, Intravenous,  Q12H  rOPINIRole, 0.25 mg, Oral, Nightly  rosuvastatin, 10 mg, Nasogastric, Nightly  senna-docusate sodium, 2 tablet, Nasogastric, BID  sertraline, 150 mg, Nasogastric, Daily  sodium chloride, 10 mL, Intravenous, Q12H  sodium chloride, 10 mL, Intravenous, Q12H      Continuous Infusions: fentanyl 10 mcg/mL,  mcg/hr, Last Rate: 125 mcg/hr (10/26/23 1011)  propofol, 5-50 mcg/kg/min, Last Rate: 20 mcg/kg/min (10/26/23 1214)      Patient continues to be critically ill, needed high complexity decision making and remains at high risk of clinical deterioration or death. I have spent a total of 31 minutes providing critical care services to this patient including but not limited to : review of labs/ microbiology/imaging/medications, serial monitoring of vital signs, adjusting ventilator settings as needed, review of other consultant's notes, monitoring input/output, review of treatment plan with bedside nurse and other treatment team, management of life support and nutrition needs.     Foreign Bolton MD   Critical Care  10/26/23   12:53 EDT

## 2023-10-27 ENCOUNTER — APPOINTMENT (OUTPATIENT)
Dept: GENERAL RADIOLOGY | Facility: HOSPITAL | Age: 83
DRG: 207 | End: 2023-10-27
Payer: MEDICARE

## 2023-10-27 LAB
ALBUMIN SERPL-MCNC: 2.6 G/DL (ref 3.5–5.2)
ALBUMIN/GLOB SERPL: 1 G/DL
ALP SERPL-CCNC: 109 U/L (ref 39–117)
ALT SERPL W P-5'-P-CCNC: 15 U/L (ref 1–33)
ANION GAP SERPL CALCULATED.3IONS-SCNC: 10 MMOL/L (ref 5–15)
ANISOCYTOSIS BLD QL: ABNORMAL
ARTERIAL PATENCY WRIST A: POSITIVE
AST SERPL-CCNC: 16 U/L (ref 1–32)
ATMOSPHERIC PRESS: ABNORMAL MM[HG]
BASE EXCESS BLDA CALC-SCNC: 0.3 MMOL/L (ref 0–3)
BDY SITE: ABNORMAL
BILIRUB SERPL-MCNC: <0.2 MG/DL (ref 0–1.2)
BUN SERPL-MCNC: 101 MG/DL (ref 8–23)
BUN/CREAT SERPL: 79.5 (ref 7–25)
CALCIUM SPEC-SCNC: 8.5 MG/DL (ref 8.6–10.5)
CHLORIDE SERPL-SCNC: 103 MMOL/L (ref 98–107)
CO2 BLDA-SCNC: 26.6 MMOL/L (ref 22–29)
CO2 SERPL-SCNC: 26 MMOL/L (ref 22–29)
CREAT SERPL-MCNC: 1.27 MG/DL (ref 0.57–1)
DEPRECATED RDW RBC AUTO: 47.7 FL (ref 37–54)
EGFRCR SERPLBLD CKD-EPI 2021: 42.3 ML/MIN/1.73
EOSINOPHIL # BLD MANUAL: 0.97 10*3/MM3 (ref 0–0.4)
EOSINOPHIL NFR BLD MANUAL: 5 % (ref 0.3–6.2)
ERYTHROCYTE [DISTWIDTH] IN BLOOD BY AUTOMATED COUNT: 15.3 % (ref 12.3–15.4)
GLOBULIN UR ELPH-MCNC: 2.6 GM/DL
GLUCOSE BLDC GLUCOMTR-MCNC: 140 MG/DL (ref 70–105)
GLUCOSE BLDC GLUCOMTR-MCNC: 142 MG/DL (ref 70–105)
GLUCOSE BLDC GLUCOMTR-MCNC: 174 MG/DL (ref 70–105)
GLUCOSE SERPL-MCNC: 174 MG/DL (ref 65–99)
HCO3 BLDA-SCNC: 25.3 MMOL/L (ref 21–28)
HCT VFR BLD AUTO: 24.9 % (ref 34–46.6)
HEMODILUTION: NO
HGB BLD-MCNC: 7.6 G/DL (ref 12–15.9)
INHALED O2 CONCENTRATION: 80 %
LYMPHOCYTES # BLD MANUAL: 11.83 10*3/MM3 (ref 0.7–3.1)
MAGNESIUM SERPL-MCNC: 2.5 MG/DL (ref 1.6–2.4)
MCH RBC QN AUTO: 27.3 PG (ref 26.6–33)
MCHC RBC AUTO-ENTMCNC: 30.7 G/DL (ref 31.5–35.7)
MCV RBC AUTO: 88.9 FL (ref 79–97)
MODALITY: ABNORMAL
NEUTROPHILS # BLD AUTO: 6.6 10*3/MM3 (ref 1.7–7)
NEUTROPHILS NFR BLD MANUAL: 34 % (ref 42.7–76)
PCO2 BLDA: 41.7 MM HG (ref 35–48)
PEEP RESPIRATORY: 7 CM[H2O]
PH BLDA: 7.39 PH UNITS (ref 7.35–7.45)
PLAT MORPH BLD: NORMAL
PLATELET # BLD AUTO: 250 10*3/MM3 (ref 140–450)
PMV BLD AUTO: 10.2 FL (ref 6–12)
PO2 BLDA: 118.5 MM HG (ref 83–108)
POTASSIUM SERPL-SCNC: 3.9 MMOL/L (ref 3.5–5.2)
PROT SERPL-MCNC: 5.2 G/DL (ref 6–8.5)
RBC # BLD AUTO: 2.8 10*6/MM3 (ref 3.77–5.28)
SAO2 % BLDCOA: 98.6 % (ref 94–98)
SCAN SLIDE: NORMAL
SMUDGE CELLS BLD QL SMEAR: ABNORMAL
SODIUM SERPL-SCNC: 139 MMOL/L (ref 136–145)
TRIGL SERPL-MCNC: 407 MG/DL (ref 0–150)
VARIANT LYMPHS NFR BLD MANUAL: 61 % (ref 19.6–45.3)
VENTILATOR MODE: AC
WBC NRBC COR # BLD: 19.4 10*3/MM3 (ref 3.4–10.8)

## 2023-10-27 PROCEDURE — 36600 WITHDRAWAL OF ARTERIAL BLOOD: CPT

## 2023-10-27 PROCEDURE — 85007 BL SMEAR W/DIFF WBC COUNT: CPT | Performed by: NURSE PRACTITIONER

## 2023-10-27 PROCEDURE — 94799 UNLISTED PULMONARY SVC/PX: CPT

## 2023-10-27 PROCEDURE — 94660 CPAP INITIATION&MGMT: CPT

## 2023-10-27 PROCEDURE — 84478 ASSAY OF TRIGLYCERIDES: CPT | Performed by: INTERNAL MEDICINE

## 2023-10-27 PROCEDURE — 82803 BLOOD GASES ANY COMBINATION: CPT

## 2023-10-27 PROCEDURE — 25010000002 FENTANYL CITRATE (PF) 50 MCG/ML SOLUTION: Performed by: STUDENT IN AN ORGANIZED HEALTH CARE EDUCATION/TRAINING PROGRAM

## 2023-10-27 PROCEDURE — 25010000002 PROPOFOL 10 MG/ML EMULSION: Performed by: INTERNAL MEDICINE

## 2023-10-27 PROCEDURE — 25010000002 MEROPENEM PER 100 MG: Performed by: INTERNAL MEDICINE

## 2023-10-27 PROCEDURE — 63710000001 INSULIN LISPRO (HUMAN) PER 5 UNITS: Performed by: INTERNAL MEDICINE

## 2023-10-27 PROCEDURE — 83735 ASSAY OF MAGNESIUM: CPT | Performed by: STUDENT IN AN ORGANIZED HEALTH CARE EDUCATION/TRAINING PROGRAM

## 2023-10-27 PROCEDURE — 80053 COMPREHEN METABOLIC PANEL: CPT | Performed by: STUDENT IN AN ORGANIZED HEALTH CARE EDUCATION/TRAINING PROGRAM

## 2023-10-27 PROCEDURE — 94003 VENT MGMT INPAT SUBQ DAY: CPT

## 2023-10-27 PROCEDURE — 25010000002 HYDRALAZINE PER 20 MG: Performed by: STUDENT IN AN ORGANIZED HEALTH CARE EDUCATION/TRAINING PROGRAM

## 2023-10-27 PROCEDURE — 94761 N-INVAS EAR/PLS OXIMETRY MLT: CPT

## 2023-10-27 PROCEDURE — 85025 COMPLETE CBC W/AUTO DIFF WBC: CPT | Performed by: NURSE PRACTITIONER

## 2023-10-27 PROCEDURE — 82948 REAGENT STRIP/BLOOD GLUCOSE: CPT

## 2023-10-27 PROCEDURE — 71045 X-RAY EXAM CHEST 1 VIEW: CPT

## 2023-10-27 RX ORDER — DEXMEDETOMIDINE HYDROCHLORIDE 4 UG/ML
.2-1.5 INJECTION, SOLUTION INTRAVENOUS
Status: DISCONTINUED | OUTPATIENT
Start: 2023-10-27 | End: 2023-10-28

## 2023-10-27 RX ADMIN — DEXMEDETOMIDINE HYDROCHLORIDE 0.2 MCG/KG/HR: 4 INJECTION, SOLUTION INTRAVENOUS at 20:28

## 2023-10-27 RX ADMIN — LANSOPRAZOLE 30 MG: 30 TABLET, ORALLY DISINTEGRATING ORAL at 05:26

## 2023-10-27 RX ADMIN — Medication 10 ML: at 08:22

## 2023-10-27 RX ADMIN — PROPOFOL INJECTABLE EMULSION 35 MCG/KG/MIN: 10 INJECTION, EMULSION INTRAVENOUS at 04:44

## 2023-10-27 RX ADMIN — INSULIN LISPRO 4 UNITS: 100 INJECTION, SOLUTION INTRAVENOUS; SUBCUTANEOUS at 05:27

## 2023-10-27 RX ADMIN — FENTANYL CITRATE 50 MCG: 50 INJECTION, SOLUTION INTRAMUSCULAR; INTRAVENOUS at 14:26

## 2023-10-27 RX ADMIN — SENNOSIDES AND DOCUSATE SODIUM 2 TABLET: 8.6; 5 TABLET ORAL at 08:21

## 2023-10-27 RX ADMIN — PROPOFOL INJECTABLE EMULSION 25 MCG/KG/MIN: 10 INJECTION, EMULSION INTRAVENOUS at 10:56

## 2023-10-27 RX ADMIN — Medication 10 ML: at 20:16

## 2023-10-27 RX ADMIN — MEROPENEM 1000 MG: 1 INJECTION, POWDER, FOR SOLUTION INTRAVENOUS at 18:53

## 2023-10-27 RX ADMIN — LEVOTHYROXINE SODIUM 100 MCG: 0.1 TABLET ORAL at 05:26

## 2023-10-27 RX ADMIN — MEROPENEM 1000 MG: 1 INJECTION, POWDER, FOR SOLUTION INTRAVENOUS at 05:26

## 2023-10-27 RX ADMIN — Medication 150 MCG/HR: at 08:55

## 2023-10-27 RX ADMIN — OXYCODONE HYDROCHLORIDE 5 MG: 5 TABLET ORAL at 02:00

## 2023-10-27 RX ADMIN — Medication 300 MG: at 08:22

## 2023-10-27 RX ADMIN — HYDRALAZINE HYDROCHLORIDE 10 MG: 20 INJECTION INTRAMUSCULAR; INTRAVENOUS at 17:05

## 2023-10-27 RX ADMIN — 0.12% CHLORHEXIDINE GLUCONATE 15 ML: 1.2 RINSE ORAL at 08:21

## 2023-10-27 RX ADMIN — SERTRALINE 150 MG: 100 TABLET, FILM COATED ORAL at 08:22

## 2023-10-27 NOTE — PROGRESS NOTES
Infectious Diseases Progress Note      LOS: 15 days   Patient Care Team:  Asia Queen APRN as PCP - General (Nurse Practitioner)  Asia Queen APRN as Nurse Practitioner (Nurse Practitioner)  Lign Bolden MD as Consulting Physician (Hematology and Oncology)    Chief Complaint: Intubated on the ventilator    Subjective     The patient had no high-grade fever during the last 24 hours.  The patient was extubated yesterday and required to be in reintubated.  Currently on 50% FiO2    Review of Systems:   Review of Systems   Unable to perform ROS: Intubated        Objective     Vital Signs  Temp:  [97.8 °F (36.6 °C)-98.8 °F (37.1 °C)] 97.8 °F (36.6 °C)  Heart Rate:  [56-81] 73  Resp:  [18-20] 18  BP: (103-162)/(37-72) 133/54  FiO2 (%):  [50 %-100 %] 50 %    Physical Exam:  Physical Exam  Constitutional:       Comments: Intubated and sedated on the ventilator   HENT:      Head: Normocephalic and atraumatic.   Eyes:      Pupils: Pupils are equal, round, and reactive to light.   Cardiovascular:      Rate and Rhythm: Normal rate and regular rhythm.   Pulmonary:      Breath sounds: Rales present.   Abdominal:      General: Abdomen is flat. Bowel sounds are normal.      Palpations: Abdomen is soft.      Comments: NG tube   Genitourinary:     Comments: Bellamy catheter  Musculoskeletal:      Cervical back: Neck supple.      Right lower leg: No edema.      Left lower leg: No edema.   Neurological:      Comments: Sedated          Results Review:    I have reviewed all clinical data, test, lab, and imaging results.     Radiology  XR Chest 1 View    Result Date: 10/27/2023  XR CHEST 1 VW Date of Exam: 10/27/2023 5:25 AM EDT Indication: Hypoxia Comparison: 10/26/2023 Findings: ET tube stable above the jaden. Esophagogastric tube below the diaphragm. Heart size normal. Persistent left basilar airspace disease with small left pleural effusion. Improving right basilar atelectasis. Negative for pneumothorax. Osseous  structures intact.     Impression: 1. Stable ET tube and esophagogastric tube. 2. Persistent left basilar airspace disease which may relate to atelectasis and/or pneumonia with small left pleural effusion. 3. Improving right basilar atelectasis. Electronically Signed: Pavan Amezquita MD  10/27/2023 7:08 AM EDT  Workstation ID: [id number]     Cardiology    Laboratory    Results from last 7 days   Lab Units 10/27/23  0428 10/26/23  0310 10/25/23  0527 10/24/23  0454 10/23/23  0421 10/22/23  0455 10/21/23  0455   WBC 10*3/mm3 19.40* 21.90* 18.30* 20.30* 22.80* 18.40* 46.90*   HEMOGLOBIN g/dL 7.6* 7.9* 7.7* 8.0* 8.8* 8.6* 9.6*   HEMATOCRIT % 24.9* 25.7* 24.4* 25.9* 28.1* 27.9* 31.8*   PLATELETS 10*3/mm3 250 230 172 203 226 200 269     Results from last 7 days   Lab Units 10/27/23  0428 10/26/23  0310 10/25/23  0527 10/24/23  0307 10/23/23  0421 10/22/23  0455 10/21/23  0455   SODIUM mmol/L 139 141 138 143 147* 147* 143   POTASSIUM mmol/L 3.9 3.6 3.8 4.0 3.9 4.6 4.9   CHLORIDE mmol/L 103 103 100 103 105 107 105   CO2 mmol/L 26.0 26.0 24.0 27.0 27.0 26.0 24.0   BUN mg/dL 101* 103* 108* 93* 88* 73* 66*   CREATININE mg/dL 1.27* 1.38* 1.43* 1.67* 1.71* 2.18* 2.32*   GLUCOSE mg/dL 174* 84 204* 244* 219* 257* 232*   ALBUMIN g/dL 2.6* 2.8* 2.6* 3.1* 3.4* 3.3* 3.0*   BILIRUBIN mg/dL <0.2 0.2 <0.2 0.2 0.2 0.2 0.2   ALK PHOS U/L 109 118* 118* 127* 163* 155* 182*   AST (SGOT) U/L 16 22 22 30 20 19 21   ALT (SGPT) U/L 15 22 22 24 22 24 30   CALCIUM mg/dL 8.5* 9.0 9.0 9.3 9.9 9.5 9.0                 Microbiology   Microbiology Results (last 10 days)       Procedure Component Value - Date/Time    Blood Culture - Blood, Hand, Left [[id number]]  (Normal) Collected: 10/23/23 1612    Lab Status: Preliminary result Specimen: Blood from Hand, Left Updated: 10/26/23 1632     Blood Culture No growth at 3 days    Blood Culture - Blood, Arm, Left [[id number]]  (Normal) Collected: 10/23/23 1611    Lab Status: Preliminary result Specimen: Blood from  Arm, Left Updated: 10/26/23 1632     Blood Culture No growth at 3 days    Respiratory Culture - Aspirate, ET Suction [[id number]]  (Abnormal)  (Susceptibility) Collected: 10/23/23 1356    Lab Status: Final result Specimen: Aspirate from ET Suction Updated: 10/25/23 0758     Respiratory Culture Heavy growth (4+) Klebsiella aerogenes      No Normal Respiratory Leanna     Gram Stain Less than 10 Epithelial cells per low power field      Moderate (3+) WBCs seen      Many (4+) Gram negative bacilli      Occasional Gram positive cocci in clusters      Rare (1+) Yeast    Susceptibility        Klebsiella aerogenes      JUAN      Cefepime Susceptible      Ceftazidime Resistant      Ceftriaxone Resistant      Gentamicin Susceptible      Levofloxacin Susceptible      Piperacillin + Tazobactam Resistant      Tetracycline Susceptible      Trimethoprim + Sulfamethoxazole Susceptible                       Susceptibility Comments       Klebsiella aerogenes    Cefazolin sensitivity will not be reported for Enterobacteriaceae in non-urine isolates. If cefazolin is preferred, please call the microbiology lab to request an E-test.  With the exception of urinary-sourced infections, aminoglycosides should not be used as monotherapy.               MRSA Screen, PCR (Inpatient) - Swab, Nares [[id number]]  (Normal) Collected: 10/18/23 1546    Lab Status: Final result Specimen: Swab from Nares Updated: 10/18/23 1727     MRSA PCR No MRSA Detected    Narrative:      The negative predictive value of this diagnostic test is high and should only be used to consider de-escalating anti-MRSA therapy. A positive result may indicate colonization with MRSA and must be correlated clinically.    Blood Culture - Blood, Hand, Right [[id number]]  (Normal) Collected: 10/18/23 1440    Lab Status: Final result Specimen: Blood from Hand, Right Updated: 10/23/23 1501     Blood Culture No growth at 5 days    Narrative:      Less than seven (7) mL's of blood was  collected.  Insufficient quantity may yield false negative results.    Fungus Culture - Wash, Bronchus [[id number]]  (Abnormal) Collected: 10/18/23 1440    Lab Status: Preliminary result Specimen: Wash from Bronchus Updated: 10/23/23 1050     Fungus Culture Candida species    AFB Culture - Wash, Bronchus [[id number]] Collected: 10/18/23 1440    Lab Status: Preliminary result Specimen: Wash from Bronchus Updated: 10/25/23 1515     AFB Culture No AFB isolated at 1 week     AFB Stain No acid fast bacilli seen on concentrated smear    Respiratory Culture - Wash, Bronchus [[id number]] Collected: 10/18/23 1440    Lab Status: Final result Specimen: Wash from Bronchus Updated: 10/20/23 0910     Respiratory Culture Light growth (2+) Normal respiratory franci. No S. aureus or Pseudomonas aeruginosa detected. Final report.     Gram Stain Many (4+) WBCs seen      Rare (1+) Gram positive cocci in clusters    Blood Culture - Blood, Hand, Right [[id number]]  (Normal) Collected: 10/18/23 1437    Lab Status: Final result Specimen: Blood from Hand, Right Updated: 10/23/23 1501     Blood Culture No growth at 5 days    Narrative:      Less than seven (7) mL's of blood was collected.  Insufficient quantity may yield false negative results.            Medication Review:       Schedule Meds  [Held by provider] amLODIPine, 10 mg, Nasogastric, Q24H  chlorhexidine, 15 mL, Mouth/Throat, Q12H  ferrous sulfate, 300 mg, Nasogastric, Once per day on Mon Wed Fri  [Held by provider] insulin glargine, 30 Units, Subcutaneous, Daily  insulin lispro, 4-24 Units, Subcutaneous, Q6H  lansoprazole, 30 mg, Nasogastric, Q AM  levothyroxine, 100 mcg, Nasogastric, Q AM  meropenem, 1,000 mg, Intravenous, Q12H  rOPINIRole, 0.25 mg, Nasogastric, Nightly  rosuvastatin, 10 mg, Nasogastric, Nightly  senna-docusate sodium, 2 tablet, Nasogastric, BID  sertraline, 150 mg, Nasogastric, Daily  sodium chloride, 10 mL, Intravenous, Q12H  sodium chloride, 10 mL, Intravenous,  Q12H        Infusion Meds  fentanyl 10 mcg/mL,  mcg/hr, Last Rate: 150 mcg/hr (10/27/23 0897)  propofol, 5-50 mcg/kg/min, Last Rate: 25 mcg/kg/min (10/27/23 1056)        PRN Meds    acetaminophen    cyclobenzaprine    dextrose    dextrose    dextrose    fentaNYL citrate (PF)    fentaNYL citrate (PF)    glucagon (human recombinant)    hydrALAZINE    ipratropium-albuterol    lidocaine    nitroglycerin    ondansetron    oxyCODONE    polyethylene glycol    [COMPLETED] Insert Peripheral IV **AND** sodium chloride    sodium chloride    sodium chloride    sodium chloride    sodium chloride        Assessment & Plan       Antimicrobial Therapy   1.  IV meropenem       2.        3.        4.        5.            Assessment     Acute respiratory failure.  Most likely secondary to hospital-acquired pneumonia.  CT scan of the chest on October 18 showed bilateral lower lobe infiltrates.  There was no additional findings consistent with PCP pneumonia  The patient is currently on ventilator on 100% FiO2  Patient is s/p bronchoscopy and BAL cultures are negative  MRSA screen was negative     Severe lymphocytosis.  Most likely secondary to underlying CLL.  Patient was not on treatment prior to admission     History of breast cancer s/p mastectomies in the past     UTI with Klebsiella pneumoniae and E. coli.  Organisms were relatively susceptible     Diabetes mellitus type 2     Rhinovirus infection.  Nasal swab was positive for rhinovirus PCR    Mild septic shock-currently off vasopressors    Possible ventilator associated pneumonia.  Tracheal aspirate culture from October 23, 2023 grew Klebsiella aerogenes.  Based on the susceptibility I am suspecting AmpC  pathogen     Plan     Continue meropenem 1 g IV every 12 hours for 7 days  Continue supportive care  Case was discussed with the patient's family at bedside and they are considering extubation today and possible comfort measures if patient does not do well after  extubation  A.m. labs        Damian Santos MD  10/27/23  13:39 EDT    Note is dictated utilizing voice recognition software/Dragon

## 2023-10-27 NOTE — PROGRESS NOTES
Critical Care Progress Note   Diane Guan : 1940 MRN:[id number] LOS:15     Principal Problem: Acute hypoxemic respiratory failure     Reason for follow up: All the medical problems listed below    Summary     A 83 y.o. female with PMH of diabetes, hypertension, breast cancer presented to the hospital after a fall and was admitted with a principal diagnosis of Acute hypoxemic respiratory failure.  On admission, patient required 2 L nasal cannula and subsequently had worsening oxygenation with hypercapnic respiratory failure.  Failed BiPAP and subsequently was intubated on 10/15.  Initially treated with broad-spectrum antibiotics and ID was consulted.  RVP positive for rhinovirus, sputum cultures were negative.  Treated with diuretics for fluid overload.  Developed profound hypoxia and needed deep sedation.  Had bronch on 10/18.  Oxygenation improved with aggressive diuretics.  Sputum cultures grew Klebsiella and treated with meropenem.  Subsequently, extubated on 10/25 but developed hypoxia afterwards, failed BiPAP and had to be reintubated.    Also had LEXUS on admission, nephrology was consulted.  Renal ultrasound with no obstruction.  Treated with diuretics.  Subsequently, urine cultures grew both E. coli and Klebsiella pneumonia.  Treated with Zosyn for 7 days.  During the hospital course, patient also developed elevated liver enzymes.  Gallbladder ultrasound showed dilated common bile duct measuring 14 mm with distended gallbladder and sludge.  GI was consulted and recommended conservative management.  Liver enzymes subsequently normalized.    Had lengthy goals of care discussion with daughter at the bedside on 10/26, discussed about potential need for trach and PEG, also offered palliative route as an option.  Subsequently, had a family meeting on 10/27, everyone decided for DNR/DNI with extubation to BiPAP today.  Plan to switch to comfort measures if patient declines.    Significant events      10/27/23 : Wean off propofol and fentanyl.  Extubation to BiPAP.  Switch to comfort measures if patient declines.    Assessment / Plan     Acute respiratory failure with hypoxia and hypercapnia / ARDS:   Due to pneumonia and fluid overload with pulmonary edema.  Possible contribution from rhinovirus infection with resultant ARDS.  Failed extubation and had to be reintubated on 10/25.  Likely due to a combination of COPD, sleep apnea, deconditioning and ARDS.  Ventilator settings noted and adjusted as needed.  FiO2 improved to 50%.  Titrate sedation/analgesia for RASS score of 0 to -1.  Wean off propofol and fentanyl drip.  Triglycerides are elevated.     Septic shock due to UTI / E. coli and Klebsiella bacteremia  RVP: +Rhinovirus  BAL cultures were negative.  Sputum cultures from 10/23 with Klebsiella.  ID following, currently on meropenem.  Shock state has resolved.     Acute on chronic heart failure with preserved EF:   Currently well compensated.  Last ECHO showed an EF of 70% with indeterminate LV diastolic function.   Diuretics on hold.  Monitor Input/Output very closely. Follow daily weights.   Net IO Since Admission: 8,413.95 mL [10/27/23 1348]    Transaminitis with dilated common bile duct  Gallbladder ultrasound with dilated CBD, measuring 14 mm with gall sludge.  GI following.  Liver enzymes normalized.     Acute toxic / metabolic encephalopathy  CT head on 10/14 with no acute pathology.  Likely due to residual effects of deep sedation for multiple days.  Some ICU delirium as well.  Slowly improving mentation.     Acute kidney injury on CKD stage III  Remains non oliguric.  Baseline creatinine of 1.3.  Likely ATN from septic shock.  Nephrology following  Has indwelling Bellamy catheter due to acute urinary retention.  We will address it after extubation.  Monitor Input/Output very closely.   Net IO Since Admission: 8,413.95 mL [10/27/23 1348]     Diabetes mellitus Type 2, not insulin-dependent : well  controlled.   Hold home metformin.  Some hypoglycemia noted, hold Lantus.  Accu checks every 6 hours and c/w humalog coverage as needed.   Last A1c of 6.5.    Essential Hypertension: well controlled.   Hold home Norvasc, chlorthalidone, Cozaar and irbesartan.  Restart medications as needed.    Iron deficiency anemia: On ferrous sulfate.  Some drop in hemoglobin with FOBT positive.  No gross bleeding, no need for emergent EGD/colonoscopy at this time.  Awaiting further goals of care discussion by family.     Primary hypothyroidism: Chronic and stable. Continue synthroid.  COPD: Chronic and stable.  Not on any home medications.  Bronchodilators as needed.    Dyslipidemia: Chronic and stable.  Continue with statins.  Breast cancer, s/p right mastectomy: Resume home anastrozole when able to swallow pills.  Chronic lymphocytic leukemia  Falls at home/functional decline  Restless leg syndrome: Continue with Requip.  Anxiety/depression: Continue Zoloft  Severe obstructive sleep apnea: API of 34.5.  Not sure if she uses any home CPAP.  Refused AVAPS in the past.    Code status:   Medical Intervention Limits: NO intubation (DNI)  Code Status (Patient has no pulse and is not breathing): No CPR (Do Not Attempt to Resuscitate)  Medical Interventions (Patient has pulse or is breathing): Limited Support  Comments: Spoke with daughter and  at the bedside on 10/27/2023       Nutrition: NPO Diet NPO Type: Strict NPO   Diet, Tube Feeding Tube Feeding Formula: Peptamen AF (Vital AF 1.2); Tube Feeding Type: Continuous; Continuous Tube Feeding Start Rate (mL/hr): 65; Then Advance Rate By (mL/hr): Do Not Advance; Every __ Hours: Patient at Goal Rate; To Goal Rate of...    DVT prophylaxis:  Mechanical DVT prophylaxis orders are present.     Subjective / Review of systems     Review of Systems   Intubated and confused  Objective / Physical Exam   Vital signs:  Temp: 97.8 °F (36.6 °C)  BP: 133/54  Heart Rate: 73  Resp: 18  SpO2: 94  %  Weight: 78.3 kg (172 lb 9.9 oz)    Admission Weight: Weight: 78.5 kg (173 lb)  Current Weight: Weight: 78.3 kg (172 lb 9.9 oz)    Input/Output in last 24 hours:    Intake/Output Summary (Last 24 hours) at 10/27/2023 1348  Last data filed at 10/27/2023 0338  Gross per 24 hour   Intake 2109 ml   Output 550 ml   Net 1559 ml      Physical Exam  Vitals and nursing note reviewed.   Constitutional:       General: She is awake.      Appearance: Normal appearance.      Interventions: She is intubated.   HENT:      Head: Normocephalic and atraumatic.      Mouth/Throat:      Mouth: Mucous membranes are moist.      Comments: ET tube in place  Eyes:      General: No scleral icterus.     Conjunctiva/sclera: Conjunctivae normal.   Cardiovascular:      Rate and Rhythm: Normal rate.      Pulses: Normal pulses.      Heart sounds: No murmur heard.     No gallop.   Pulmonary:      Effort: Pulmonary effort is normal. She is intubated.      Breath sounds: Rales (Bilateral, scattered) present. No wheezing.   Abdominal:      General: Bowel sounds are normal.      Palpations: Abdomen is soft.      Tenderness: There is no abdominal tenderness.   Genitourinary:     Comments: Bellamy catheter  Musculoskeletal:      Right lower leg: No edema.      Left lower leg: No edema.   Neurological:      Comments: Confused, blank stare, withdraws to stimuli.   Psychiatric:         Behavior: Behavior is uncooperative.        Radiology and Labs     Results from last 7 days   Lab Units 10/27/23  0428 10/26/23  0310 10/25/23  0527 10/24/23  0454 10/23/23  0421   WBC 10*3/mm3 19.40* 21.90* 18.30* 20.30* 22.80*   HEMATOCRIT % 24.9* 25.7* 24.4* 25.9* 28.1*   PLATELETS 10*3/mm3 250 230 172 203 226      Results from last 7 days   Lab Units 10/27/23  0428 10/26/23  0310 10/25/23  0527 10/24/23  0307 10/23/23  0421   SODIUM mmol/L 139 141 138 143 147*   POTASSIUM mmol/L 3.9 3.6 3.8 4.0 3.9   CHLORIDE mmol/L 103 103 100 103 105   CO2 mmol/L 26.0 26.0 24.0 27.0 27.0    BUN mg/dL 101* 103* 108* 93* 88*   CREATININE mg/dL 1.27* 1.38* 1.43* 1.67* 1.71*      Current medications   Scheduled Meds: [Held by provider] amLODIPine, 10 mg, Nasogastric, Q24H  ferrous sulfate, 300 mg, Nasogastric, Once per day on Mon Wed Fri  [Held by provider] insulin glargine, 30 Units, Subcutaneous, Daily  insulin lispro, 4-24 Units, Subcutaneous, Q6H  lansoprazole, 30 mg, Nasogastric, Q AM  levothyroxine, 100 mcg, Nasogastric, Q AM  meropenem, 1,000 mg, Intravenous, Q12H  rOPINIRole, 0.25 mg, Nasogastric, Nightly  rosuvastatin, 10 mg, Nasogastric, Nightly  senna-docusate sodium, 2 tablet, Nasogastric, BID  sertraline, 150 mg, Nasogastric, Daily  sodium chloride, 10 mL, Intravenous, Q12H  sodium chloride, 10 mL, Intravenous, Q12H      Continuous Infusions:      Patient continues to be critically ill, needed high complexity decision making and remains at high risk of clinical deterioration or death. I have spent a total of 31 minutes providing critical care services to this patient including but not limited to : review of labs/ microbiology/imaging/medications, serial monitoring of vital signs, adjusting ventilator settings as needed, review of other consultant's notes, monitoring input/output, review of treatment plan with bedside nurse and other treatment team, management of life support and nutrition needs.     Foreign Bolton MD   Critical Care  10/27/23   13:48 EDT

## 2023-10-27 NOTE — CASE MANAGEMENT/SOCIAL WORK
Continued Stay Note   [facility]ang     Patient Name: Diane Guan  MRN: [id number]  Today's Date: 10/27/2023    Admit Date: 10/10/2023    Plan: DC PLAN: Referral to [facility] pending. Requires [facility] approved.(VENT)   Discharge Plan       Row Name 10/27/23 1416       Plan    Plan Comments per notes: plan to extubate to bipap today.  DNR/DNI.  if not tolerate, plan on comfort measures.                      Expected Discharge Date and Time       Expected Discharge Date Expected Discharge Time    Oct 29, 2023               Tessie Salomon RN

## 2023-10-27 NOTE — PROGRESS NOTES
Nutrition Services  Patient Name: Diane Guan  YOB: 1940  MRN: [id number]  Admission date: 10/10/2023    PPE Documentation        PPE Worn By Provider Did not enter room for this encounter   PPE Worn By Patient  N/A     PROGRESS NOTE      Encounter Information: Progress note to check on TF.  Remains intubated.  MD with lengthy discussion with family at bedside who are deciding on further goals of care.  On propofol at 16.44 mL/hour providing 434 kcal per day.  TF + propofol providing 134% of kcal needs.  RD to continue to monitor for need to change end goal TF.  MD aware of elevated residuals and will look to change sedation depending on family decision.         PO Diet: NPO Diet NPO Type: Strict NPO   PO Supplements: None ordered    PO Intake:  NPO       Current nutrition support: Peptamen AF at 65 mL/hour + 75 mL/hour water flush    Nutrition support review: Documented as above per EMR       Labs (reviewed below): Reviewed, management per attending        GI Function:  Last documented BM 10/26 (yesterday)  Residuals WNL       Nutrition Intervention Updates: Continue Peptamen AF at goal rate of 65 mL/hr. Continue FWF.        Results from last 7 days   Lab Units 10/27/23  0428 10/26/23  0310 10/25/23  0527   SODIUM mmol/L 139 141 138   POTASSIUM mmol/L 3.9 3.6 3.8   CHLORIDE mmol/L 103 103 100   CO2 mmol/L 26.0 26.0 24.0   BUN mg/dL 101* 103* 108*   CREATININE mg/dL 1.27* 1.38* 1.43*   CALCIUM mg/dL 8.5* 9.0 9.0   BILIRUBIN mg/dL <0.2 0.2 <0.2   ALK PHOS U/L 109 118* 118*   ALT (SGPT) U/L 15 22 22   AST (SGOT) U/L 16 22 22   GLUCOSE mg/dL 174* 84 204*     Results from last 7 days   Lab Units 10/27/23  0428 10/26/23  0310 10/25/23  0527 10/25/23  0527 10/24/23  0454 10/24/23  0307   MAGNESIUM mg/dL 2.5* 2.3  --  2.1  --  1.9   PHOSPHORUS mg/dL  --   --   --   --   --  2.8   HEMOGLOBIN g/dL 7.6* 7.9*  --  7.7*   < >  --    HEMATOCRIT % 24.9* 25.7*  --  24.4*   < >  --    TRIGLYCERIDES mg/dL 407*  223*   < >  --   --   --     < > = values in this interval not displayed.     COVID19   Date Value Ref Range Status   10/10/2023 Not Detected Not Detected - Ref. Range Final     Lab Results   Component Value Date    HGBA1C 6.50 (H) 10/13/2023     RD to follow up per protocol.    Electronically signed by:  Vernell Gil RD  10/27/23 07:26 EDT

## 2023-10-27 NOTE — PROGRESS NOTES
"RENAL/KCC:     LOS: 15 days    Patient Care Team:  Asia Queen APRN as PCP - General (Nurse Practitioner)  Asia Queen APRN as Nurse Practitioner (Nurse Practitioner)  Ling Bolden MD as Consulting Physician (Hematology and Oncology)    Chief Complaint:  Acute resp failure    Subjective     Interval History:   Chart reviewed  Remains on the vent  Non-oliguric    Objective     Vital Sign Min/Max for last 24 hours  Temp  Min: 97.8 °F (36.6 °C)  Max: 98.8 °F (37.1 °C)   BP  Min: 104/45  Max: 162/45   Pulse  Min: 56  Max: 81   Resp  Min: 18  Max: 20   SpO2  Min: 94 %  Max: 100 %   No data recorded   No data recorded     Flowsheet Rows      Flowsheet Row First Filed Value   Admission Height 162.6 cm (64\") Documented at 10/10/2023 1252   Admission Weight 78.5 kg (173 lb) Documented at 10/10/2023 1252            No intake/output data recorded.  I/O last 3 completed shifts:  In: 2591 [I.V.:1383; Other:75; NG/GT:1133]  Out: 2250 [Urine:2250]    Physical Exam:  GEN: Sedated, intubated  ENT: +ETT  NECK: Supple, no JVD  CHEST: Lungs are clear  CV: RRR, no M/G/R, no peripheral edema  ABD: Soft, NT, +BS  SKIN: Warm and Dry  NEURO: Sedated      WBC WBC   Date Value Ref Range Status   10/27/2023 19.40 (H) 3.40 - 10.80 10*3/mm3 Final   10/26/2023 21.90 (H) 3.40 - 10.80 10*3/mm3 Final   10/25/2023 18.30 (H) 3.40 - 10.80 10*3/mm3 Final      HGB Hemoglobin   Date Value Ref Range Status   10/27/2023 7.6 (L) 12.0 - 15.9 g/dL Final   10/26/2023 7.9 (L) 12.0 - 15.9 g/dL Final   10/25/2023 7.7 (L) 12.0 - 15.9 g/dL Final      HCT Hematocrit   Date Value Ref Range Status   10/27/2023 24.9 (L) 34.0 - 46.6 % Final   10/26/2023 25.7 (L) 34.0 - 46.6 % Final   10/25/2023 24.4 (L) 34.0 - 46.6 % Final      Platlets No results found for: \"LABPLAT\"   MCV MCV   Date Value Ref Range Status   10/27/2023 88.9 79.0 - 97.0 fL Final   10/26/2023 87.2 79.0 - 97.0 fL Final   10/25/2023 86.6 79.0 - 97.0 fL Final          Sodium Sodium " "  Date Value Ref Range Status   10/27/2023 139 136 - 145 mmol/L Final   10/26/2023 141 136 - 145 mmol/L Final   10/25/2023 138 136 - 145 mmol/L Final      Potassium Potassium   Date Value Ref Range Status   10/27/2023 3.9 3.5 - 5.2 mmol/L Final   10/26/2023 3.6 3.5 - 5.2 mmol/L Final     Comment:     Slight hemolysis detected by analyzer. Results may be affected.   10/25/2023 3.8 3.5 - 5.2 mmol/L Final      Chloride Chloride   Date Value Ref Range Status   10/27/2023 103 98 - 107 mmol/L Final   10/26/2023 103 98 - 107 mmol/L Final   10/25/2023 100 98 - 107 mmol/L Final      CO2 CO2   Date Value Ref Range Status   10/27/2023 26.0 22.0 - 29.0 mmol/L Final   10/26/2023 26.0 22.0 - 29.0 mmol/L Final   10/25/2023 24.0 22.0 - 29.0 mmol/L Final      BUN BUN   Date Value Ref Range Status   10/27/2023 101 (H) 8 - 23 mg/dL Final   10/26/2023 103 (H) 8 - 23 mg/dL Final   10/25/2023 108 (H) 8 - 23 mg/dL Final      Creatinine Creatinine   Date Value Ref Range Status   10/27/2023 1.27 (H) 0.57 - 1.00 mg/dL Final   10/26/2023 1.38 (H) 0.57 - 1.00 mg/dL Final   10/25/2023 1.43 (H) 0.57 - 1.00 mg/dL Final      Calcium Calcium   Date Value Ref Range Status   10/27/2023 8.5 (L) 8.6 - 10.5 mg/dL Final   10/26/2023 9.0 8.6 - 10.5 mg/dL Final   10/25/2023 9.0 8.6 - 10.5 mg/dL Final      PO4 No results found for: \"CAPO4\"   Albumin Albumin   Date Value Ref Range Status   10/27/2023 2.6 (L) 3.5 - 5.2 g/dL Final   10/26/2023 2.8 (L) 3.5 - 5.2 g/dL Final   10/25/2023 2.6 (L) 3.5 - 5.2 g/dL Final      Magnesium Magnesium   Date Value Ref Range Status   10/27/2023 2.5 (H) 1.6 - 2.4 mg/dL Final   10/26/2023 2.3 1.6 - 2.4 mg/dL Final   10/25/2023 2.1 1.6 - 2.4 mg/dL Final      Uric Acid No results found for: \"URICACID\"        Results Review:     I reviewed the patient's new clinical results.    [Held by provider] amLODIPine, 10 mg, Nasogastric, Q24H  chlorhexidine, 15 mL, Mouth/Throat, Q12H  ferrous sulfate, 300 mg, Nasogastric, Once per day on " Mon Wed Fri  [Held by provider] insulin glargine, 30 Units, Subcutaneous, Daily  insulin lispro, 4-24 Units, Subcutaneous, Q6H  lansoprazole, 30 mg, Nasogastric, Q AM  levothyroxine, 100 mcg, Nasogastric, Q AM  meropenem, 1,000 mg, Intravenous, Q12H  rOPINIRole, 0.25 mg, Nasogastric, Nightly  rosuvastatin, 10 mg, Nasogastric, Nightly  senna-docusate sodium, 2 tablet, Nasogastric, BID  sertraline, 150 mg, Nasogastric, Daily  sodium chloride, 10 mL, Intravenous, Q12H  sodium chloride, 10 mL, Intravenous, Q12H      fentanyl 10 mcg/mL,  mcg/hr, Last Rate: 150 mcg/hr (10/27/23 3414)  propofol, 5-50 mcg/kg/min, Last Rate: 25 mcg/kg/min (10/27/23 5960)        Medication Review: Reviewed    Assessment & Plan     LEXUS/CKD3  Sepsis  Fluid overload  Hypokalemia - from diuresis  Hypocalcemia  Pulmonary edema  Hypotension    Acute hypoxemic respiratory failure    Mixed anxiety and depressive disorder    Primary osteoarthritis involving multiple joints    Hiatal hernia with gastroesophageal reflux    Mixed hyperlipidemia    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    Acquired hypothyroidism    Type 2 diabetes mellitus with stage 3b chronic kidney disease, without long-term current use of insulin    Insomnia    Overactive bladder    Vitamin B 12 deficiency    Vitamin D deficiency    LIBBY (obstructive sleep apnea)    COPD (chronic obstructive pulmonary disease)    CKD (chronic kidney disease) stage 3, GFR 30-59 ml/min    History of breast cancer    Personal history of CLL (chronic lymphocytic leukemia)    History of cigarette smoking    Fall at home    Rhinovirus infection    Acute respiratory failure    Hyperkalemia    Respiratory failure    Pleural effusion    Plan: Cr improved to 1.2. Significant azotemia likely from GI bleed.  Continue to monitor off diuretics.  Continue free water with TF's.  FC in place for urinary retention.  Vent per Pulm.  Family discussing GOC.  Possible transition to palliative/comfort  care.  Will follow.       Frederick Bonds MD  [facility]  10/27/23  11:27 EDT

## 2023-10-27 NOTE — PLAN OF CARE
Goal Outcome Evaluation:              Outcome Evaluation: Sedation weaned down, extubated to AVAPS. DNR/DNI. Following commands. Family at bedside.

## 2023-10-28 ENCOUNTER — APPOINTMENT (OUTPATIENT)
Dept: GENERAL RADIOLOGY | Facility: HOSPITAL | Age: 83
DRG: 207 | End: 2023-10-28
Payer: MEDICARE

## 2023-10-28 LAB
ALBUMIN SERPL-MCNC: 3 G/DL (ref 3.5–5.2)
ALBUMIN/GLOB SERPL: 1.2 G/DL
ALP SERPL-CCNC: 123 U/L (ref 39–117)
ALT SERPL W P-5'-P-CCNC: 16 U/L (ref 1–33)
ANION GAP SERPL CALCULATED.3IONS-SCNC: 11 MMOL/L (ref 5–15)
ANISOCYTOSIS BLD QL: ABNORMAL
AST SERPL-CCNC: 18 U/L (ref 1–32)
BACTERIA SPEC AEROBE CULT: NORMAL
BACTERIA SPEC AEROBE CULT: NORMAL
BILIRUB SERPL-MCNC: <0.2 MG/DL (ref 0–1.2)
BUN SERPL-MCNC: 75 MG/DL (ref 8–23)
BUN/CREAT SERPL: 75.8 (ref 7–25)
CALCIUM SPEC-SCNC: 9 MG/DL (ref 8.6–10.5)
CHLORIDE SERPL-SCNC: 108 MMOL/L (ref 98–107)
CO2 SERPL-SCNC: 23 MMOL/L (ref 22–29)
CREAT SERPL-MCNC: 0.99 MG/DL (ref 0.57–1)
DEPRECATED RDW RBC AUTO: 45.9 FL (ref 37–54)
EGFRCR SERPLBLD CKD-EPI 2021: 57 ML/MIN/1.73
EOSINOPHIL # BLD MANUAL: 0.52 10*3/MM3 (ref 0–0.4)
EOSINOPHIL NFR BLD MANUAL: 2 % (ref 0.3–6.2)
ERYTHROCYTE [DISTWIDTH] IN BLOOD BY AUTOMATED COUNT: 15.4 % (ref 12.3–15.4)
GLOBULIN UR ELPH-MCNC: 2.5 GM/DL
GLUCOSE BLDC GLUCOMTR-MCNC: 136 MG/DL (ref 70–105)
GLUCOSE BLDC GLUCOMTR-MCNC: 150 MG/DL (ref 70–105)
GLUCOSE SERPL-MCNC: 135 MG/DL (ref 65–99)
HCT VFR BLD AUTO: 29.9 % (ref 34–46.6)
HGB BLD-MCNC: 9.4 G/DL (ref 12–15.9)
LARGE PLATELETS: ABNORMAL
LYMPHOCYTES # BLD MANUAL: 14.09 10*3/MM3 (ref 0.7–3.1)
LYMPHOCYTES NFR BLD MANUAL: 1 % (ref 5–12)
MAGNESIUM SERPL-MCNC: 2.7 MG/DL (ref 1.6–2.4)
MCH RBC QN AUTO: 27.2 PG (ref 26.6–33)
MCHC RBC AUTO-ENTMCNC: 31.3 G/DL (ref 31.5–35.7)
MCV RBC AUTO: 87 FL (ref 79–97)
MONOCYTES # BLD: 0.26 10*3/MM3 (ref 0.1–0.9)
NEUTROPHILS # BLD AUTO: 11.22 10*3/MM3 (ref 1.7–7)
NEUTROPHILS NFR BLD MANUAL: 41 % (ref 42.7–76)
NEUTS BAND NFR BLD MANUAL: 2 % (ref 0–5)
PLATELET # BLD AUTO: 316 10*3/MM3 (ref 140–450)
PMV BLD AUTO: 10.4 FL (ref 6–12)
POTASSIUM SERPL-SCNC: 4.5 MMOL/L (ref 3.5–5.2)
PROT SERPL-MCNC: 5.5 G/DL (ref 6–8.5)
RBC # BLD AUTO: 3.44 10*6/MM3 (ref 3.77–5.28)
SCAN SLIDE: NORMAL
SMUDGE CELLS BLD QL SMEAR: ABNORMAL
SODIUM SERPL-SCNC: 142 MMOL/L (ref 136–145)
VARIANT LYMPHS NFR BLD MANUAL: 54 % (ref 19.6–45.3)
WBC NRBC COR # BLD: 26.1 10*3/MM3 (ref 3.4–10.8)

## 2023-10-28 PROCEDURE — 85007 BL SMEAR W/DIFF WBC COUNT: CPT | Performed by: INTERNAL MEDICINE

## 2023-10-28 PROCEDURE — 25010000002 PROCHLORPERAZINE 10 MG/2ML SOLUTION: Performed by: NURSE PRACTITIONER

## 2023-10-28 PROCEDURE — 25010000002 ONDANSETRON PER 1 MG: Performed by: INTERNAL MEDICINE

## 2023-10-28 PROCEDURE — 25010000002 HYDRALAZINE PER 20 MG: Performed by: STUDENT IN AN ORGANIZED HEALTH CARE EDUCATION/TRAINING PROGRAM

## 2023-10-28 PROCEDURE — 25010000002 FENTANYL CITRATE (PF) 50 MCG/ML SOLUTION: Performed by: INTERNAL MEDICINE

## 2023-10-28 PROCEDURE — 94799 UNLISTED PULMONARY SVC/PX: CPT

## 2023-10-28 PROCEDURE — 71045 X-RAY EXAM CHEST 1 VIEW: CPT

## 2023-10-28 PROCEDURE — 25010000002 MEROPENEM PER 100 MG: Performed by: INTERNAL MEDICINE

## 2023-10-28 PROCEDURE — 82948 REAGENT STRIP/BLOOD GLUCOSE: CPT

## 2023-10-28 PROCEDURE — 25010000002 HYDROMORPHONE 1 MG/ML SOLUTION: Performed by: INTERNAL MEDICINE

## 2023-10-28 PROCEDURE — 85025 COMPLETE CBC W/AUTO DIFF WBC: CPT | Performed by: INTERNAL MEDICINE

## 2023-10-28 PROCEDURE — 74018 RADEX ABDOMEN 1 VIEW: CPT

## 2023-10-28 PROCEDURE — 83735 ASSAY OF MAGNESIUM: CPT | Performed by: STUDENT IN AN ORGANIZED HEALTH CARE EDUCATION/TRAINING PROGRAM

## 2023-10-28 PROCEDURE — 94660 CPAP INITIATION&MGMT: CPT

## 2023-10-28 PROCEDURE — 25010000002 LORAZEPAM PER 2 MG: Performed by: NURSE PRACTITIONER

## 2023-10-28 PROCEDURE — 25010000002 HYDROMORPHONE 1 MG/ML SOLUTION: Performed by: NURSE PRACTITIONER

## 2023-10-28 PROCEDURE — 25010000002 FUROSEMIDE PER 20 MG: Performed by: INTERNAL MEDICINE

## 2023-10-28 PROCEDURE — 92610 EVALUATE SWALLOWING FUNCTION: CPT

## 2023-10-28 PROCEDURE — 25010000002 ENOXAPARIN PER 10 MG: Performed by: INTERNAL MEDICINE

## 2023-10-28 PROCEDURE — 80053 COMPREHEN METABOLIC PANEL: CPT | Performed by: STUDENT IN AN ORGANIZED HEALTH CARE EDUCATION/TRAINING PROGRAM

## 2023-10-28 RX ORDER — FUROSEMIDE 10 MG/ML
40 INJECTION INTRAMUSCULAR; INTRAVENOUS ONCE
Status: COMPLETED | OUTPATIENT
Start: 2023-10-28 | End: 2023-10-28

## 2023-10-28 RX ORDER — ALPRAZOLAM 0.5 MG/1
0.5 TABLET ORAL 3 TIMES DAILY
Status: DISPENSED | OUTPATIENT
Start: 2023-10-28 | End: 2023-10-29

## 2023-10-28 RX ORDER — PROCHLORPERAZINE EDISYLATE 5 MG/ML
5 INJECTION INTRAMUSCULAR; INTRAVENOUS EVERY 6 HOURS PRN
Status: DISCONTINUED | OUTPATIENT
Start: 2023-10-28 | End: 2023-10-30

## 2023-10-28 RX ORDER — FENTANYL CITRATE 50 UG/ML
12.5 INJECTION, SOLUTION INTRAMUSCULAR; INTRAVENOUS
Status: DISCONTINUED | OUTPATIENT
Start: 2023-10-28 | End: 2023-10-28

## 2023-10-28 RX ORDER — ENOXAPARIN SODIUM 100 MG/ML
40 INJECTION SUBCUTANEOUS DAILY
Status: DISCONTINUED | OUTPATIENT
Start: 2023-10-28 | End: 2023-11-06 | Stop reason: HOSPADM

## 2023-10-28 RX ORDER — LORAZEPAM 2 MG/ML
0.5 INJECTION INTRAMUSCULAR EVERY 6 HOURS PRN
Status: DISPENSED | OUTPATIENT
Start: 2023-10-28 | End: 2023-10-30

## 2023-10-28 RX ORDER — ALPRAZOLAM 0.5 MG/1
0.5 TABLET ORAL 3 TIMES DAILY
Qty: 300 TABLET | Refills: 0 | Status: DISCONTINUED | OUTPATIENT
Start: 2023-10-28 | End: 2023-10-28

## 2023-10-28 RX ORDER — CHLORTHALIDONE 25 MG/1
25 TABLET ORAL DAILY
Status: DISCONTINUED | OUTPATIENT
Start: 2023-10-29 | End: 2023-10-28

## 2023-10-28 RX ORDER — PROCHLORPERAZINE EDISYLATE 5 MG/ML
12.5 INJECTION INTRAMUSCULAR; INTRAVENOUS EVERY 6 HOURS PRN
Status: DISCONTINUED | OUTPATIENT
Start: 2023-10-28 | End: 2023-10-28

## 2023-10-28 RX ORDER — CHLORTHALIDONE 25 MG/1
25 TABLET ORAL DAILY
Status: DISCONTINUED | OUTPATIENT
Start: 2023-10-29 | End: 2023-11-06

## 2023-10-28 RX ADMIN — HYDRALAZINE HYDROCHLORIDE 10 MG: 20 INJECTION INTRAMUSCULAR; INTRAVENOUS at 13:01

## 2023-10-28 RX ADMIN — MEROPENEM 1000 MG: 1 INJECTION, POWDER, FOR SOLUTION INTRAVENOUS at 16:36

## 2023-10-28 RX ADMIN — ONDANSETRON 4 MG: 2 INJECTION INTRAMUSCULAR; INTRAVENOUS at 17:22

## 2023-10-28 RX ADMIN — FUROSEMIDE 40 MG: 10 INJECTION, SOLUTION INTRAMUSCULAR; INTRAVENOUS at 07:18

## 2023-10-28 RX ADMIN — DEXMEDETOMIDINE HYDROCHLORIDE 0.7 MCG/KG/HR: 4 INJECTION, SOLUTION INTRAVENOUS at 03:51

## 2023-10-28 RX ADMIN — FENTANYL CITRATE 25 MCG: 50 INJECTION, SOLUTION INTRAMUSCULAR; INTRAVENOUS at 00:29

## 2023-10-28 RX ADMIN — Medication 10 ML: at 07:43

## 2023-10-28 RX ADMIN — ONDANSETRON 4 MG: 2 INJECTION INTRAMUSCULAR; INTRAVENOUS at 15:31

## 2023-10-28 RX ADMIN — PROCHLORPERAZINE EDISYLATE 5 MG: 5 INJECTION INTRAMUSCULAR; INTRAVENOUS at 19:53

## 2023-10-28 RX ADMIN — HYDROMORPHONE HYDROCHLORIDE 0.5 MG: 1 INJECTION, SOLUTION INTRAMUSCULAR; INTRAVENOUS; SUBCUTANEOUS at 22:54

## 2023-10-28 RX ADMIN — ALPRAZOLAM 0.5 MG: 0.5 TABLET ORAL at 12:55

## 2023-10-28 RX ADMIN — MEROPENEM 1000 MG: 1 INJECTION, POWDER, FOR SOLUTION INTRAVENOUS at 07:17

## 2023-10-28 RX ADMIN — ENOXAPARIN SODIUM 40 MG: 40 INJECTION, SOLUTION SUBCUTANEOUS at 16:35

## 2023-10-28 RX ADMIN — LORAZEPAM 0.5 MG: 2 INJECTION INTRAMUSCULAR; INTRAVENOUS at 22:54

## 2023-10-28 RX ADMIN — HYDROMORPHONE HYDROCHLORIDE 0.25 MG: 1 INJECTION, SOLUTION INTRAMUSCULAR; INTRAVENOUS; SUBCUTANEOUS at 21:47

## 2023-10-28 RX ADMIN — Medication 10 ML: at 21:34

## 2023-10-28 RX ADMIN — HYDRALAZINE HYDROCHLORIDE 10 MG: 20 INJECTION INTRAMUSCULAR; INTRAVENOUS at 16:35

## 2023-10-28 RX ADMIN — DEXMEDETOMIDINE HYDROCHLORIDE 0.7 MCG/KG/HR: 4 INJECTION, SOLUTION INTRAVENOUS at 09:50

## 2023-10-28 RX ADMIN — Medication 10 ML: at 21:35

## 2023-10-28 RX ADMIN — CYCLOBENZAPRINE 10 MG: 10 TABLET, FILM COATED ORAL at 17:31

## 2023-10-28 NOTE — PROGRESS NOTES
Critical Care Progress Note   Diane Guan : 1940 MRN:[id number] LOS:18     Principal Problem: Acute hypoxemic respiratory failure     Reason for follow up: All the medical problems listed below    Summary     A 83 y.o. female with PMH of diabetes, hypertension, breast cancer presented to the hospital after a fall and was admitted with a principal diagnosis of Acute hypoxemic respiratory failure.  On admission, patient required 2 L nasal cannula and subsequently had worsening oxygenation with hypercapnic respiratory failure.  Failed BiPAP and subsequently was intubated on 10/15.  Initially treated with broad-spectrum antibiotics and ID was consulted.  RVP positive for rhinovirus, sputum cultures were negative.  Treated with diuretics for fluid overload.  Developed profound hypoxia and needed deep sedation.  Had bronch on 10/18.  Oxygenation improved with aggressive diuretics.  Sputum cultures grew Klebsiella and treated with meropenem.  Subsequently, extubated on 10/25 but developed hypoxia afterwards, failed BiPAP and had to be reintubated.    Also had LEXUS on admission, nephrology was consulted.  Renal ultrasound with no obstruction.  Treated with diuretics.  Subsequently, urine cultures grew both E. coli and Klebsiella pneumonia.  Treated with Zosyn for 7 days.  During the hospital course, patient also developed elevated liver enzymes.  Gallbladder ultrasound showed dilated common bile duct measuring 14 mm with distended gallbladder and sludge.  GI was consulted and recommended conservative management.  Liver enzymes subsequently normalized.    Subsequently, family decided on DNR/DNI prior to extubation on 10/27.  Did well with BiPAP.      Significant events     10/28/23 : Wean off Precedex, add low-dose Xanax.  Continue with scheduled AVAPS during nighttime and as needed during the daytime.  Redose Lasix as needed.  CHARMAINE Bellamy.  Speech therapist to assess prior to initiating diet.    Assessment / Plan      Acute respiratory failure with hypoxia and hypercapnia / ARDS:   Due to pneumonia and fluid overload with pulmonary edema.  Possible contribution from rhinovirus infection with resultant ARDS.  Failed extubation and had to be reintubated on 10/25.  Likely due to a combination of COPD, sleep apnea, deconditioning and ARDS.  Extubated to BiPAP on 10/27, improved.  Wean off oxygen as tolerated.  Continue with scheduled AVAPS during the nighttime and as needed during the daytime.     Septic shock due to UTI / E. coli and Klebsiella bacteremia  RVP: +Rhinovirus  BAL cultures were negative.  Sputum cultures from 10/23 with Klebsiella.  ID following, currently on meropenem.  Shock state has resolved.     Acute on chronic heart failure with preserved EF:   Currently well compensated.  Last ECHO showed an EF of 70% with indeterminate LV diastolic function.   On intermittent diuretics, nephrology managing the diuretic regimen.  Monitor Input/Output very closely. Follow daily weights.   Net IO Since Admission: 6,948.95 mL [10/28/23 1112]    Transaminitis with dilated common bile duct  Gallbladder ultrasound with dilated CBD, measuring 14 mm with gall sludge.  GI following.  Liver enzymes normalized.     Acute toxic / metabolic encephalopathy  CT head on 10/14 with no acute pathology.  Likely due to residual effects of deep sedation for multiple days.  Some ICU delirium as well.  Mentation improved significantly.     Acute kidney injury on CKD stage III  Remains non oliguric.  Baseline creatinine of 1.3.  Likely ATN from septic shock.  Nephrology following, on intermittent diuretics.  Monitor Input/Output very closely.   Net IO Since Admission: 6,948.95 mL [10/28/23 1112]     Diabetes mellitus Type 2, not insulin-dependent : well controlled.   Hold home metformin.    Accu checks every 6 hours and c/w humalog coverage as needed.   Last A1c of 6.5.    Essential Hypertension: well controlled.   Resume home chlorthalidone.   Hold home Norvasc, Cozaar and irbesartan.  Restart medications as needed.    Iron deficiency anemia: On ferrous sulfate.  Some drop in hemoglobin with FOBT positive.  No gross bleeding, no need for emergent EGD/colonoscopy at this time.  Awaiting CBC from 10/28.     Primary hypothyroidism: Chronic and stable. Continue synthroid.  COPD: Chronic and stable.  Not on any home medications.  Bronchodilators as needed.    Dyslipidemia: Chronic and stable.  Continue with statins.  Breast cancer, s/p right mastectomy: Resume home anastrozole when able to swallow pills.  Chronic lymphocytic leukemia  Falls at home/functional decline  Restless leg syndrome: Continue with Requip.  Anxiety/depression: Continue Zoloft  Severe obstructive sleep apnea: API of 34.5.  Not sure if she uses any home CPAP.  Refused AVAPS in the past.    Code status:   Medical Intervention Limits: NO intubation (DNI)  Code Status (Patient has no pulse and is not breathing): No CPR (Do Not Attempt to Resuscitate)  Medical Interventions (Patient has pulse or is breathing): Limited Support  Comments: Spoke with daughter and  at the bedside on 10/27/2023       Nutrition: NPO Diet NPO Type: Strict NPO   Diet, Tube Feeding Tube Feeding Formula: Peptamen AF (Vital AF 1.2); Tube Feeding Type: Continuous; Continuous Tube Feeding Start Rate (mL/hr): 65; Then Advance Rate By (mL/hr): Do Not Advance; Every __ Hours: Patient at Goal Rate; To Goal Rate of...    DVT prophylaxis:  Mechanical DVT prophylaxis orders are present.     Subjective / Review of systems     Review of Systems   Feeble voice but able to answer questions appropriately, no specific complaints. Not a great historian  Objective / Physical Exam   Vital signs:  Temp: 98 °F (36.7 °C)  BP: 142/55  Heart Rate: 62  Resp: 21  SpO2: 91 %  Weight: 78.3 kg (172 lb 9.9 oz)    Admission Weight: Weight: 78.5 kg (173 lb)  Current Weight: Weight: 78.3 kg (172 lb 9.9 oz)    Input/Output in last 24  hours:    Intake/Output Summary (Last 24 hours) at 10/28/2023 1112  Last data filed at 10/28/2023 0800  Gross per 24 hour   Intake 60 ml   Output 1525 ml   Net -1465 ml      Physical Exam  Vitals and nursing note reviewed.   Constitutional:       General: She is awake. She is not in acute distress.     Appearance: Normal appearance.   HENT:      Mouth/Throat:      Mouth: Mucous membranes are moist.      Pharynx: Oropharynx is clear.   Eyes:      General: No scleral icterus.     Extraocular Movements: Extraocular movements intact.      Conjunctiva/sclera: Conjunctivae normal.   Cardiovascular:      Rate and Rhythm: Normal rate.      Heart sounds: No murmur heard.     No gallop.   Pulmonary:      Breath sounds: Rales (minimal bibasilar) present. No wheezing.   Abdominal:      General: Bowel sounds are normal.      Palpations: Abdomen is soft.      Tenderness: There is no abdominal tenderness.   Musculoskeletal:         General: No tenderness.      Right lower leg: No edema.      Left lower leg: No edema.   Neurological:      Mental Status: She is alert.      Comments: Alert, Awake, oriented to place and person. Generalized weakness   Psychiatric:         Behavior: Behavior is cooperative.        Radiology and Labs     Results from last 7 days   Lab Units 10/27/23  0428 10/26/23  0310 10/25/23  0527 10/24/23  0454 10/23/23  0421   WBC 10*3/mm3 19.40* 21.90* 18.30* 20.30* 22.80*   HEMATOCRIT % 24.9* 25.7* 24.4* 25.9* 28.1*   PLATELETS 10*3/mm3 250 230 172 203 226      Results from last 7 days   Lab Units 10/28/23  0625 10/27/23  0428 10/26/23  0310 10/25/23  0527 10/24/23  0307   SODIUM mmol/L 142 139 141 138 143   POTASSIUM mmol/L 4.5 3.9 3.6 3.8 4.0   CHLORIDE mmol/L 108* 103 103 100 103   CO2 mmol/L 23.0 26.0 26.0 24.0 27.0   BUN mg/dL 75* 101* 103* 108* 93*   CREATININE mg/dL 0.99 1.27* 1.38* 1.43* 1.67*      Current medications   Scheduled Meds: ALPRAZolam, 0.5 mg, Oral, TID  [Held by provider] amLODIPine, 10 mg,  Nasogastric, Q24H  [START ON 10/29/2023] chlorthalidone, 25 mg, Oral, Daily  ferrous sulfate, 300 mg, Nasogastric, Once per day on Mon Wed Fri  insulin lispro, 4-24 Units, Subcutaneous, Q6H  lansoprazole, 30 mg, Nasogastric, Q AM  levothyroxine, 100 mcg, Nasogastric, Q AM  meropenem, 1,000 mg, Intravenous, Q12H  rOPINIRole, 0.25 mg, Nasogastric, Nightly  rosuvastatin, 10 mg, Nasogastric, Nightly  senna-docusate sodium, 2 tablet, Nasogastric, BID  sertraline, 150 mg, Nasogastric, Daily  sodium chloride, 10 mL, Intravenous, Q12H  sodium chloride, 10 mL, Intravenous, Q12H      Continuous Infusions: dexmedetomidine, 0.2-1.5 mcg/kg/hr, Last Rate: 1.5 mcg/kg/hr (10/28/23 5778)      Plan discussed with RN. Reviewed all other data in the last 24 hours, including but not limited to vitals, labs, microbiology, imaging and pertinent notes from other providers. High complexity decision making and high risk of deterioration.    Foreign Bolton MD   Critical Care  10/28/23   11:12 EDT

## 2023-10-28 NOTE — PLAN OF CARE
Goal Outcome Evaluation:  Plan of Care Reviewed With: patient, daughter, family, grandchild(marco), spouse        Progress: improving     Pt failed bedside SLP and is to stay NPO with ice chips only.   RN placed DHT and started tube feeds. Pt is fairly tolerating TF. Pt vomited once so RN held TF for one hour and restarted them at half the previous rate. Pt had a coughing fit and believes that is why she vomited and said it was not the TF. RN titrated Dex off after administering PO Alprazolam and pt remains calm and cooperative. Bellamy catheter was also removed today. Pt currently has a purewick on and it is working great. RN had to administer PRN Hydralazine twice today due to SBP > 160. RN waiting for confirmation from MD Bolton on potential transfer orders.

## 2023-10-28 NOTE — PLAN OF CARE
"Goal Outcome Evaluation:    Bedside swallow evaluation completed. SLP orders received s/p second extubation, extended vent support required 10.15-10.25; 10.25-10.27. Vocal quality with severe dysphonia, intermittent aphonia. Cough quality weak, nonproductive. Pt highly motivated for PO intake, currently NPO with no alternative means of nutrition. Daughter at bedside throughout. Adequate labial seal for tsp presentations. Appropriate manipulation of ice chips, complete oral clearance with all PO. Pt exhibits throat clear final 1/6 ice chips, 1/2 HTL. Cough following puree 2/2 tsp, 1/2 HTL by tsp, 2/2 water by tsp. Cough weak, nonproductive. Oral suction utilized with scant posterior secretions returned. Question pharyngeal awareness as pt states following puree \"it's hard to go down.\" Palpated swallow with questionable weakness, mildly difficult to assess d/t body habitus.     Discussed results with pt and daughter. S/s aspiration with nearly all PO, concerns for pharyngeal impairment given x2 intubations and length of intubations. Discussed initiate ice chips only via modified [place] Water protocol, rationale, with both in agreement. Additionally discussed as pt opting for limited support/DNR/DNI would suggest reducing aspiration risk, considering temporary non-oral means and proceeding conservatively. Will plan re-evaluation for potential PO versus any additional workup required. All in agreement.     Recommend: NPO with non-oral means of nutrition/hydration/medication administration. Pt may have MINIMAL ice chips via modified polanco water protocol (see below).                   The rationale to recommend water when a PO diet cannot appropriately/functionally sustain nutrition is because water is low risk for aspiration pna when compared to aspiration of food or other liquids.    Benefits of a water protocol include but are not limited to:     Oral gratification   Engagement of oropharyngeal swallow musculature "   Decrease likelihood of dehydration      Guidelines for proper implementation include:  Thorough oral care prior to consuming ICE CHIPS  Upright at 90 degree hip flexion  Small sips at slow rate  Monitor for any changes in respiratory status and discontinue if distress noted    The [place] Free Water Protocol is a research based protocol established in 1984.

## 2023-10-28 NOTE — PROGRESS NOTES
[facility] Medicine Services   Daily Progress Note    Patient Name: Diane Guan  : 1940  MRN: [id number]  Primary Care Physician:  Asia Queen APRN  Date of admission: 10/10/2023        Subjective      Chief Complaint:     Patient Reports    ROS       Objective      Vitals:   Temp:  [97.9 °F (36.6 °C)-99 °F (37.2 °C)] 97.9 °F (36.6 °C)  Heart Rate:  [58-85] 62  Resp:  [18-24] 19  BP: (128-165)/() 130/46  Flow (L/min):  [15] 15    Physical Exam     Constitutional:       General: She is awake. She is not in acute distress.     Appearance: Normal appearance.   HENT:      Mouth/Throat:      Mouth: Mucous membranes are moist.      Pharynx: Oropharynx is clear.   Eyes:      General: No scleral icterus.     Extraocular Movements: Extraocular movements intact.      Conjunctiva/sclera: Conjunctivae normal.   Cardiovascular:      Rate and Rhythm: Normal rate.      Heart sounds: No murmur heard.     No gallop.   Pulmonary:      Breath sounds: Rales (minimal bibasilar) present. No wheezing.   Abdominal:      General: Bowel sounds are normal.      Palpations: Abdomen is soft.      Tenderness: There is no abdominal tenderness.   Musculoskeletal:         General: No tenderness.      Right lower leg: No edema.      Left lower leg: No edema.   Neurological:      Mental Status: She is alert.      Comments: Alert, Awake, oriented to place and person. Generalized weakness   Psychiatric:         Behavior: Behavior is cooperative.          Result Review    Result Review:  I have personally reviewed the results from the time of this admission to 10/28/2023 17:59 EDT and agree with these findings:  [x]  Laboratory  [x]  Microbiology  [x]  Radiology  []  EKG/Telemetry   []  Cardiology/Vascular   []  Pathology  []  Old records  []  Other:      Wounds (last 24 hours)               Assessment & Plan      Brief Patient Summary:  Diane Guan is a 83 y.o. female with PMH of diabetes, hypertension, breast  cancer presented to the hospital after a fall and was admitted with a principal diagnosis of Acute hypoxemic respiratory failure.  On admission, patient required 2 L nasal cannula and subsequently had worsening oxygenation with hypercapnic respiratory failure.  Failed BiPAP and subsequently was intubated on 10/15.  Initially treated with broad-spectrum antibiotics and ID was consulted.  RVP positive for rhinovirus, sputum cultures were negative.  Treated with diuretics for fluid overload.  Developed profound hypoxia and needed deep sedation.  Had bronch on 10/18.  Oxygenation improved with aggressive diuretics.  Sputum cultures grew Klebsiella and treated with meropenem.  Subsequently, extubated on 10/25 but developed hypoxia afterwards, failed BiPAP and had to be reintubated.  Also had LEXUS on admission, nephrology was consulted.  Renal ultrasound with no obstruction.  Treated with diuretics.  Subsequently, urine cultures grew both E. coli and Klebsiella pneumonia.  Treated with Zosyn for 7 days. During the hospital course, patient also developed elevated liver enzymes. Gallbladder ultrasound showed dilated common bile duct measuring 14 mm with distended gallbladder and sludge.  GI was consulted and recommended conservative management.  Liver enzymes subsequently normalized.  Subsequently, family decided on DNR/DNI prior to extubation on 10/27.  Did well with BiPAP.        ALPRAZolam, 0.5 mg, Nasogastric, TID  [Held by provider] amLODIPine, 10 mg, Nasogastric, Q24H  [START ON 10/29/2023] chlorthalidone, 25 mg, Nasogastric, Daily  enoxaparin, 40 mg, Subcutaneous, Daily  ferrous sulfate, 300 mg, Nasogastric, Once per day on Mon Wed Fri  insulin lispro, 4-24 Units, Subcutaneous, Q6H  lansoprazole, 30 mg, Nasogastric, Q AM  levothyroxine, 100 mcg, Nasogastric, Q AM  meropenem, 1,000 mg, Intravenous, Q12H  rOPINIRole, 0.25 mg, Nasogastric, Nightly  rosuvastatin, 10 mg, Nasogastric, Nightly  senna-docusate sodium, 2  tablet, Nasogastric, BID  sertraline, 150 mg, Nasogastric, Daily  sodium chloride, 10 mL, Intravenous, Q12H  sodium chloride, 10 mL, Intravenous, Q12H             Active Hospital Problems:  Active Hospital Problems    Diagnosis     **Acute hypoxemic respiratory failure     Respiratory failure     Hyperkalemia     Acute respiratory failure     History of breast cancer     Personal history of CLL (chronic lymphocytic leukemia)     History of cigarette smoking     Fall at home     Rhinovirus infection     CKD (chronic kidney disease) stage 3, GFR 30-59 ml/min     COPD (chronic obstructive pulmonary disease)     LIBBY (obstructive sleep apnea)     Vitamin D deficiency     Vitamin B 12 deficiency     Overactive bladder     Primary osteoarthritis involving multiple joints     Mixed hyperlipidemia     Mixed anxiety and depressive disorder     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     Insomnia     Hiatal hernia with gastroesophageal reflux     Type 2 diabetes mellitus with stage 3b chronic kidney disease, without long-term current use of insulin     Acquired hypothyroidism      Plan:     Acute respiratory failure with hypoxia and hypercapnia / ARDS:   Due to pneumonia and fluid overload with pulmonary edema.  Possible contribution from rhinovirus infection with resultant ARDS.  Failed extubation and had to be reintubated on 10/25.  Likely due to a combination of COPD, sleep apnea, deconditioning and ARDS.  Extubated to BiPAP on 10/27, improved.  Wean off oxygen as tolerated.  Continue with scheduled AVAPS during the nighttime and as needed during the daytime.     Septic shock due to UTI / E. coli and Klebsiella bacteremia  RVP: +Rhinovirus  BAL cultures were negative.  Sputum cultures from 10/23 with Klebsiella.  ID following, currently on meropenem.  Shock state has resolved.     Acute on chronic heart failure with preserved EF:   Currently well compensated.  Last ECHO showed an EF of 70% with indeterminate  LV diastolic function.   On intermittent diuretics, nephrology managing the diuretic regimen.  Monitor Input/Output very closely. Follow daily weights.   Net IO Since Admission: 6,948.95 mL [10/28/23 1112]     Transaminitis with dilated common bile duct  Gallbladder ultrasound with dilated CBD, measuring 14 mm with gall sludge.  GI following.  Liver enzymes normalized.     Acute toxic / metabolic encephalopathy  CT head on 10/14 with no acute pathology.  Likely due to residual effects of deep sedation for multiple days.  Some ICU delirium as well.  Mentation improved significantly.     Acute kidney injury on CKD stage III  Remains non oliguric.  Baseline creatinine of 1.3.  Likely ATN from septic shock.  Nephrology following, on intermittent diuretics.  Monitor Input/Output very closely.   Net IO Since Admission: 6,948.95 mL [10/28/23 1112]     Diabetes mellitus Type 2, not insulin-dependent : well controlled.   Hold home metformin.    Accu checks every 6 hours and c/w humalog coverage as needed.   Last A1c of 6.5.     Essential Hypertension: well controlled.   Resume home chlorthalidone.  Hold home Norvasc, Cozaar and irbesartan.  Restart medications as needed.     Iron deficiency anemia: On ferrous sulfate.  Some drop in hemoglobin with FOBT positive.  No gross bleeding, no need for emergent EGD/colonoscopy at this time.  Awaiting CBC from 10/28.     Primary hypothyroidism: Chronic and stable. Continue synthroid.  COPD: Chronic and stable.  Not on any home medications.  Bronchodilators as needed.     Dyslipidemia: Chronic and stable.  Continue with statins.  Breast cancer, s/p right mastectomy: Resume home anastrozole when able to swallow pills.  Chronic lymphocytic leukemia  Falls at home/functional decline  Restless leg syndrome: Continue with Requip.  Anxiety/depression: Continue Zoloft  Severe obstructive sleep apnea: API of 34.5.  Not sure if she uses any home CPAP.  Refused AVAPS in the past.        DVT  prophylaxis:  Medical and mechanical DVT prophylaxis orders are present.    CODE STATUS:    Medical Intervention Limits: NO intubation (DNI)  Code Status (Patient has no pulse and is not breathing): No CPR (Do Not Attempt to Resuscitate)  Medical Interventions (Patient has pulse or is breathing): Limited Support  Comments: Spoke with daughter and  at the bedside on 10/27/2023      Disposition:  .      Electronically signed by Roseline Zayas MD, 10/28/23, 17:59 EDT.  [facility] Hospitalist Team

## 2023-10-28 NOTE — PROGRESS NOTES
Infectious Diseases Progress Note      LOS: 18 days   Patient Care Team:  Asia Queen APRN as PCP - General (Nurse Practitioner)  Asia Queen APRN as Nurse Practitioner (Nurse Practitioner)  Ling Bolden MD as Consulting Physician (Hematology and Oncology)    Chief Complaint: Intubated on the ventilator    Subjective     The patient had no high-grade fever during the last 24 hours.  Patient is currently on 15 L of oxygen by 5 flow nasal cannula and is alert and awake.  Hemodynamically stable    Review of Systems:   Review of Systems   Unable to perform ROS: Acuity of condition   Constitutional:  Positive for fatigue.   Respiratory:  Positive for cough and shortness of breath.         Objective     Vital Signs  Temp:  [97.9 °F (36.6 °C)-99 °F (37.2 °C)] 98 °F (36.7 °C)  Heart Rate:  [58-85] 62  Resp:  [18-24] 18  BP: (128-176)/() 142/55    Physical Exam:  Physical Exam  Vitals and nursing note reviewed.   Constitutional:       Appearance: She is ill-appearing.   HENT:      Head: Normocephalic and atraumatic.   Eyes:      Conjunctiva/sclera: Conjunctivae normal.      Pupils: Pupils are equal, round, and reactive to light.   Cardiovascular:      Rate and Rhythm: Normal rate and regular rhythm.   Pulmonary:      Effort: Respiratory distress present.      Breath sounds: Rales present.   Abdominal:      General: Abdomen is flat. Bowel sounds are normal.      Palpations: Abdomen is soft.      Comments: NG tube   Genitourinary:     Comments: External catheter  Musculoskeletal:      Cervical back: Neck supple.      Right lower leg: No edema.      Left lower leg: No edema.   Neurological:      Mental Status: She is alert.      Comments: Alert and awake          Results Review:    I have reviewed all clinical data, test, lab, and imaging results.     Radiology  XR Abdomen KUB    Result Date: 10/28/2023  XR ABDOMEN KUB Date of Exam: 10/28/2023 12:13 PM EDT Indication: DHT Placement Comparison: KUB  dated 10/15/2023 Findings: There is a smallbore feeding tube with tip terminating at the distal stomach or proximal duodenum. Nonobstructive small bowel gas pattern.     Impression: 1. Smallbore feeding tube with tip terminating at the distal stomach or proximal duodenum. Electronically Signed: Antony Jessica  10/28/2023 12:23 PM EDT  Workstation ID: [id number]    XR Chest 1 View    Result Date: 10/28/2023  XR CHEST 1 VW Date of Exam: 10/28/2023 1:36 AM EDT Indication: Hypoxia Comparison: Chest x-ray 10/27/2023 Findings: Interval extubation and removal of NG/OG tube. Stable cardiomediastinal silhouette within normal limits. There is similar haziness at the left lung base, likely small left pleural effusion. There is a new thin opacity at the right lung base, favor atelectasis. No pneumothorax.     Impression: Interval extubation and removal of NG/OG tube. Stable cardiomediastinal silhouette within normal limits. There is similar haziness at the left lung base, likely small left pleural effusion. There is a new thin opacity at the right lung base, favor atelectasis. No pneumothorax. Electronically Signed: Mark Rodriguez MD  10/28/2023 8:07 AM EDT  Workstation ID: [id number]     Cardiology    Laboratory    Results from last 7 days   Lab Units 10/27/23  0428 10/26/23  0310 10/25/23  0527 10/24/23  0454 10/23/23  0421 10/22/23  0455   WBC 10*3/mm3 19.40* 21.90* 18.30* 20.30* 22.80* 18.40*   HEMOGLOBIN g/dL 7.6* 7.9* 7.7* 8.0* 8.8* 8.6*   HEMATOCRIT % 24.9* 25.7* 24.4* 25.9* 28.1* 27.9*   PLATELETS 10*3/mm3 250 230 172 203 226 200     Results from last 7 days   Lab Units 10/28/23  0625 10/27/23  0428 10/26/23  0310 10/25/23  0527 10/24/23  0307 10/23/23  0421 10/22/23  0455   SODIUM mmol/L 142 139 141 138 143 147* 147*   POTASSIUM mmol/L 4.5 3.9 3.6 3.8 4.0 3.9 4.6   CHLORIDE mmol/L 108* 103 103 100 103 105 107   CO2 mmol/L 23.0 26.0 26.0 24.0 27.0 27.0 26.0   BUN mg/dL 75* 101* 103* 108* 93* 88* 73*   CREATININE mg/dL  0.99 1.27* 1.38* 1.43* 1.67* 1.71* 2.18*   GLUCOSE mg/dL 135* 174* 84 204* 244* 219* 257*   ALBUMIN g/dL 3.0* 2.6* 2.8* 2.6* 3.1* 3.4* 3.3*   BILIRUBIN mg/dL <0.2 <0.2 0.2 <0.2 0.2 0.2 0.2   ALK PHOS U/L 123* 109 118* 118* 127* 163* 155*   AST (SGOT) U/L 18 16 22 22 30 20 19   ALT (SGPT) U/L 16 15 22 22 24 22 24   CALCIUM mg/dL 9.0 8.5* 9.0 9.0 9.3 9.9 9.5                 Microbiology   Microbiology Results (last 10 days)       Procedure Component Value - Date/Time    Blood Culture - Blood, Hand, Left [[id number]]  (Normal) Collected: 10/23/23 1612    Lab Status: Preliminary result Specimen: Blood from Hand, Left Updated: 10/27/23 1631     Blood Culture No growth at 4 days    Blood Culture - Blood, Arm, Left [[id number]]  (Normal) Collected: 10/23/23 1611    Lab Status: Preliminary result Specimen: Blood from Arm, Left Updated: 10/27/23 1631     Blood Culture No growth at 4 days    Respiratory Culture - Aspirate, ET Suction [[id number]]  (Abnormal)  (Susceptibility) Collected: 10/23/23 1356    Lab Status: Final result Specimen: Aspirate from ET Suction Updated: 10/25/23 0754     Respiratory Culture Heavy growth (4+) Klebsiella aerogenes      No Normal Respiratory Leanna     Gram Stain Less than 10 Epithelial cells per low power field      Moderate (3+) WBCs seen      Many (4+) Gram negative bacilli      Occasional Gram positive cocci in clusters      Rare (1+) Yeast    Susceptibility        Klebsiella aerogenes      JUAN      Cefepime Susceptible      Ceftazidime Resistant      Ceftriaxone Resistant      Gentamicin Susceptible      Levofloxacin Susceptible      Piperacillin + Tazobactam Resistant      Tetracycline Susceptible      Trimethoprim + Sulfamethoxazole Susceptible                       Susceptibility Comments       Klebsiella aerogenes    Cefazolin sensitivity will not be reported for Enterobacteriaceae in non-urine isolates. If cefazolin is preferred, please call the microbiology lab to request an  E-test.  With the exception of urinary-sourced infections, aminoglycosides should not be used as monotherapy.               MRSA Screen, PCR (Inpatient) - Swab, Nares [[id number]]  (Normal) Collected: 10/18/23 1546    Lab Status: Final result Specimen: Swab from Nares Updated: 10/18/23 1727     MRSA PCR No MRSA Detected    Narrative:      The negative predictive value of this diagnostic test is high and should only be used to consider de-escalating anti-MRSA therapy. A positive result may indicate colonization with MRSA and must be correlated clinically.    Blood Culture - Blood, Hand, Right [[id number]]  (Normal) Collected: 10/18/23 1440    Lab Status: Final result Specimen: Blood from Hand, Right Updated: 10/23/23 1501     Blood Culture No growth at 5 days    Narrative:      Less than seven (7) mL's of blood was collected.  Insufficient quantity may yield false negative results.    Fungus Culture - Wash, Bronchus [[id number]]  (Abnormal) Collected: 10/18/23 1440    Lab Status: Preliminary result Specimen: Wash from Bronchus Updated: 10/23/23 1050     Fungus Culture Candida species    AFB Culture - Wash, Bronchus [[id number]] Collected: 10/18/23 1440    Lab Status: Preliminary result Specimen: Wash from Bronchus Updated: 10/25/23 1515     AFB Culture No AFB isolated at 1 week     AFB Stain No acid fast bacilli seen on concentrated smear    Respiratory Culture - Wash, Bronchus [[id number]] Collected: 10/18/23 1440    Lab Status: Final result Specimen: Wash from Bronchus Updated: 10/20/23 0910     Respiratory Culture Light growth (2+) Normal respiratory franci. No S. aureus or Pseudomonas aeruginosa detected. Final report.     Gram Stain Many (4+) WBCs seen      Rare (1+) Gram positive cocci in clusters    Blood Culture - Blood, Hand, Right [[id number]]  (Normal) Collected: 10/18/23 1437    Lab Status: Final result Specimen: Blood from Hand, Right Updated: 10/23/23 1501     Blood Culture No growth at 5 days    Narrative:       Less than seven (7) mL's of blood was collected.  Insufficient quantity may yield false negative results.            Medication Review:       Schedule Meds  ALPRAZolam, 0.5 mg, Nasogastric, TID  [Held by provider] amLODIPine, 10 mg, Nasogastric, Q24H  [START ON 10/29/2023] chlorthalidone, 25 mg, Nasogastric, Daily  ferrous sulfate, 300 mg, Nasogastric, Once per day on Mon Wed Fri  insulin lispro, 4-24 Units, Subcutaneous, Q6H  lansoprazole, 30 mg, Nasogastric, Q AM  levothyroxine, 100 mcg, Nasogastric, Q AM  meropenem, 1,000 mg, Intravenous, Q12H  rOPINIRole, 0.25 mg, Nasogastric, Nightly  rosuvastatin, 10 mg, Nasogastric, Nightly  senna-docusate sodium, 2 tablet, Nasogastric, BID  sertraline, 150 mg, Nasogastric, Daily  sodium chloride, 10 mL, Intravenous, Q12H  sodium chloride, 10 mL, Intravenous, Q12H        Infusion Meds  dexmedetomidine, 0.2-1.5 mcg/kg/hr, Last Rate: 0.5 mcg/kg/hr (10/28/23 1256)        PRN Meds    acetaminophen    cyclobenzaprine    dextrose    dextrose    dextrose    glucagon (human recombinant)    hydrALAZINE    HYDROmorphone    ipratropium-albuterol    lidocaine    nitroglycerin    ondansetron    oxyCODONE    polyethylene glycol    [COMPLETED] Insert Peripheral IV **AND** sodium chloride    sodium chloride    sodium chloride    sodium chloride    sodium chloride        Assessment & Plan       Antimicrobial Therapy   1.  IV meropenem       2.        3.        4.        5.            Assessment     Acute respiratory failure.  Most likely secondary to hospital-acquired pneumonia.  CT scan of the chest on October 18 showed bilateral lower lobe infiltrates.  There was no additional findings consistent with PCP pneumonia  Patient is s/p bronchoscopy and BAL cultures are negative  MRSA screen was negative  -Patient is now extubated and on 15 L of oxygen by nasal cannula     Severe lymphocytosis.  Most likely secondary to underlying CLL.  Patient was not on treatment prior to admission.   Improving     History of breast cancer s/p mastectomies in the past     UTI with Klebsiella pneumoniae and E. coli.  Organisms were relatively susceptible     Diabetes mellitus type 2     Rhinovirus infection.  Nasal swab was positive for rhinovirus PCR    Mild septic shock-currently off vasopressors    Possible ventilator associated pneumonia.  Tracheal aspirate culture from October 23, 2023 grew Klebsiella aerogenes.  Based on the susceptibility I am suspecting AmpC  pathogen     Plan     Continue meropenem 1 g IV every 12 hours for 7 days  Continue supportive care  A.m. labs  Case discussed with patient's family at bedside  Case discussed with SIOMARA Plunkett  10/28/23  13:29 EDT    Note is dictated utilizing voice recognition software/Dragon

## 2023-10-28 NOTE — PROGRESS NOTES
"RENAL/KCC:     LOS: 18 days    Patient Care Team:  Asia Queen APRN as PCP - General (Nurse Practitioner)  Asia Queen APRN as Nurse Practitioner (Nurse Practitioner)  Ling Bolden MD as Consulting Physician (Hematology and Oncology)    Chief Complaint:  Acute resp failure    Subjective     Interval History:   Chart reviewed  Extubated  Non-oliguric    Objective     Vital Sign Min/Max for last 24 hours  Temp  Min: 97.8 °F (36.6 °C)  Max: 99 °F (37.2 °C)   BP  Min: 103/45  Max: 176/64   Pulse  Min: 57  Max: 85   Resp  Min: 18  Max: 24   SpO2  Min: 88 %  Max: 98 %   Flow (L/min)  Min: 15  Max: 15   No data recorded     Flowsheet Rows      Flowsheet Row First Filed Value   Admission Height 162.6 cm (64\") Documented at 10/10/2023 1252   Admission Weight 78.5 kg (173 lb) Documented at 10/10/2023 1252            I/O this shift:  In: -   Out: 900 [Urine:900]  I/O last 3 completed shifts:  In: 2169 [I.V.:961; Other:75; NG/GT:1133]  Out: 1625 [Urine:1625]    Physical Exam:  GEN: Sedated, intubated  ENT: +ETT  NECK: Supple, no JVD  CHEST: Lungs are clear  CV: RRR, no M/G/R, no peripheral edema  ABD: Soft, NT, +BS  SKIN: Warm and Dry  NEURO: Sedated      WBC WBC   Date Value Ref Range Status   10/27/2023 19.40 (H) 3.40 - 10.80 10*3/mm3 Final   10/26/2023 21.90 (H) 3.40 - 10.80 10*3/mm3 Final      HGB Hemoglobin   Date Value Ref Range Status   10/27/2023 7.6 (L) 12.0 - 15.9 g/dL Final   10/26/2023 7.9 (L) 12.0 - 15.9 g/dL Final      HCT Hematocrit   Date Value Ref Range Status   10/27/2023 24.9 (L) 34.0 - 46.6 % Final   10/26/2023 25.7 (L) 34.0 - 46.6 % Final      Platlets No results found for: \"LABPLAT\"   MCV MCV   Date Value Ref Range Status   10/27/2023 88.9 79.0 - 97.0 fL Final   10/26/2023 87.2 79.0 - 97.0 fL Final          Sodium Sodium   Date Value Ref Range Status   10/27/2023 139 136 - 145 mmol/L Final   10/26/2023 141 136 - 145 mmol/L Final      Potassium Potassium   Date Value Ref Range Status " "  10/27/2023 3.9 3.5 - 5.2 mmol/L Final   10/26/2023 3.6 3.5 - 5.2 mmol/L Final     Comment:     Slight hemolysis detected by analyzer. Results may be affected.      Chloride Chloride   Date Value Ref Range Status   10/27/2023 103 98 - 107 mmol/L Final   10/26/2023 103 98 - 107 mmol/L Final      CO2 CO2   Date Value Ref Range Status   10/27/2023 26.0 22.0 - 29.0 mmol/L Final   10/26/2023 26.0 22.0 - 29.0 mmol/L Final      BUN BUN   Date Value Ref Range Status   10/27/2023 101 (H) 8 - 23 mg/dL Final   10/26/2023 103 (H) 8 - 23 mg/dL Final      Creatinine Creatinine   Date Value Ref Range Status   10/27/2023 1.27 (H) 0.57 - 1.00 mg/dL Final   10/26/2023 1.38 (H) 0.57 - 1.00 mg/dL Final      Calcium Calcium   Date Value Ref Range Status   10/27/2023 8.5 (L) 8.6 - 10.5 mg/dL Final   10/26/2023 9.0 8.6 - 10.5 mg/dL Final      PO4 No results found for: \"CAPO4\"   Albumin Albumin   Date Value Ref Range Status   10/27/2023 2.6 (L) 3.5 - 5.2 g/dL Final   10/26/2023 2.8 (L) 3.5 - 5.2 g/dL Final      Magnesium Magnesium   Date Value Ref Range Status   10/27/2023 2.5 (H) 1.6 - 2.4 mg/dL Final   10/26/2023 2.3 1.6 - 2.4 mg/dL Final      Uric Acid No results found for: \"URICACID\"        Results Review:     I reviewed the patient's new clinical results.    [Held by provider] amLODIPine, 10 mg, Nasogastric, Q24H  ferrous sulfate, 300 mg, Nasogastric, Once per day on Mon Wed Fri  furosemide, 40 mg, Intravenous, Once  [Held by provider] insulin glargine, 30 Units, Subcutaneous, Daily  insulin lispro, 4-24 Units, Subcutaneous, Q6H  lansoprazole, 30 mg, Nasogastric, Q AM  levothyroxine, 100 mcg, Nasogastric, Q AM  meropenem, 1,000 mg, Intravenous, Q12H  rOPINIRole, 0.25 mg, Nasogastric, Nightly  rosuvastatin, 10 mg, Nasogastric, Nightly  senna-docusate sodium, 2 tablet, Nasogastric, BID  sertraline, 150 mg, Nasogastric, Daily  sodium chloride, 10 mL, Intravenous, Q12H  sodium chloride, 10 mL, Intravenous, Q12H      dexmedetomidine, " 0.2-1.5 mcg/kg/hr, Last Rate: 0.7 mcg/kg/hr (10/28/23 0351)        Medication Review: Reviewed    Assessment & Plan     LEXUS/CKD3  Sepsis  Fluid overload  Hypokalemia - from diuresis  Hypocalcemia  Pulmonary edema  Hypotension    Acute hypoxemic respiratory failure    Mixed anxiety and depressive disorder    Primary osteoarthritis involving multiple joints    Hiatal hernia with gastroesophageal reflux    Mixed hyperlipidemia    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    Acquired hypothyroidism    Type 2 diabetes mellitus with stage 3b chronic kidney disease, without long-term current use of insulin    Insomnia    Overactive bladder    Vitamin B 12 deficiency    Vitamin D deficiency    LIBBY (obstructive sleep apnea)    COPD (chronic obstructive pulmonary disease)    CKD (chronic kidney disease) stage 3, GFR 30-59 ml/min    History of breast cancer    Personal history of CLL (chronic lymphocytic leukemia)    History of cigarette smoking    Fall at home    Rhinovirus infection    Acute respiratory failure    Hyperkalemia    Respiratory failure    Pleural effusion    Plan: Cr improved to 1.2 yesterday.  F/U labs this AM.  Patient has been extubated and is now DNR/DNI.  Lasix 40 mg IV x 1 this AM. Will follow.       Frederick Bonds MD  [facility]  10/28/23  05:59 EDT

## 2023-10-28 NOTE — CASE MANAGEMENT/SOCIAL WORK
Continued Stay Note  [facility]     Patient Name: Diane Guan  MRN: [id number]  Today's Date: 10/28/2023    Admit Date: 10/10/2023    Plan: DC PLAN: Referral to [facility] pending. Requires CATHLEEN mills approved.(VENT)   Discharge Plan       Row Name 10/28/23 2080       Plan    Plan Comments DC barrier: 15L HF, DHT for TF.                      Expected Discharge Date and Time       Expected Discharge Date Expected Discharge Time    Oct 29, 2023               Tessie Salomon RN

## 2023-10-28 NOTE — THERAPY EVALUATION
Acute Care - Speech Language Pathology   Swallow Initial Evaluation  [place]     Patient Name: Diane Guan  : 1940  MRN: [id number]  Today's Date: 10/28/2023               Admit Date: 10/10/2023    Visit Dx:     ICD-10-CM ICD-9-CM   1. Hypoxemia  R09.02 799.02   2. Chronic respiratory failure with hypoxia  J96.11 518.83     799.02   3. Rhinovirus infection  B34.8 079.3   4. Dyspnea, unspecified type  R06.00 786.09   5. Cough, unspecified type  R05.9 786.2   6. Rib pain on right side  R07.81 786.50   7. Right low back pain, unspecified chronicity, unspecified whether sciatica present  M54.50 724.2   8. Acute respiratory failure with hypoxia  J96.01 518.81   9. Pneumonia of left lower lobe due to infectious organism  J18.9 486   10. Mucus plugging of bronchi  T17.500A 519.19   11. Acute hypoxemic respiratory failure  J96.01 518.81   12. 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    Acquired hypothyroidism    Type 2 diabetes mellitus with stage 3b chronic kidney disease, without long-term current use of insulin    Insomnia    Postmenopausal status    Shoulder pain    Overactive bladder    Vitamin B 12 deficiency    Seasonal allergies    Absolute anemia    Vitamin D deficiency    LIBBY (obstructive sleep apnea)    Nocturnal hypoxia    Acute respiratory failure with hypoxia and hypercapnia    Abdominal pain    COPD (chronic obstructive pulmonary disease)    CKD (chronic kidney disease) stage 3, GFR 30-59 ml/min    Sepsis due to pneumonia    Acute on chronic respiratory failure with hypoxemia     Class 1 obesity due to excess calories with serious comorbidity and body mass index (BMI) of 32.0 to 32.9 in adult    Acute hypoxemic respiratory failure    History of breast cancer    Personal history of CLL (chronic lymphocytic leukemia)    History of cigarette smoking    Fall at home    Rhinovirus infection    Acute respiratory failure    Hyperkalemia    Respiratory failure     Past Medical History:   Diagnosis Date    Acute bronchitis due to human metapneumovirus 02/08/2020    Anxiety disorder     Arthritis     Breast cancer 02/2019    Invasive ductal carcinoma    Chronic lymphocytic leukemia (CLL), B-cell 01/2019    Depression     Disease of thyroid gland     Diverticulosis of colon 2018    Identified on colonoscopy    Dysphagia 12/2020    Dyspnea on exertion     Fall at home 10/11/2023    Hemorrhoid     Hiatal hernia 12/2020    Hiatal hernia with GERD     large hiatal hernia with high-grade reflux on barium swallow; s/p laparoscopic fundoplication with gastropexy    History of breast cancer 10/11/2023    History of cigarette smoking 10/11/2023    History of diabetes mellitus     no meds now    Hyperlipidemia     Hypertension     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    aspiration pneumonia; completed abx therapy    OAB (overactive bladder)     Personal history of CLL (chronic lymphocytic leukemia) 10/11/2023    Skin cancer     Sleep apnea     daughter states pt does not have and doesn't have machine     Past Surgical History:   Procedure Laterality Date    BLADDER SURGERY      BREAST BIOPSY      BRONCHOSCOPY N/A 09/13/2019    Procedure: BRONCHOSCOPY with bronchial washing;  Surgeon: Marnie Frankel MD;  Location: [facility] ENDOSCOPY;  Service: Pulmonary    BRONCHOSCOPY Bilateral 10/18/2023    Procedure: BRONCHOSCOPY AT BEDSIDE;  Surgeon: Vinicius Heredia DO;  Location: [facility] ENDOSCOPY;  Service: Pulmonary;  Laterality: Bilateral;    COLONOSCOPY  07/19/2018    severe diverticulosis    CYST REMOVAL      Removed  a fatty cyst off of her back    ENDOSCOPY N/A 11/23/2020    Procedure: ESOPHAGOGASTRODUODENOSCOPY with dilatation (Bougie # 48, 50, 52, 54, 56, 58);  Surgeon: Den Plummer DO;  Location: [facility] ENDOSCOPY;  Service: General;  Laterality: N/A;  Post: large hiatal hernia, joshua's ulcers    HIATAL HERNIA REPAIR N/A 12/30/2020    Procedure: Laparoscopic hiatal hernia repair with gastropexy;  Surgeon: Den Plummer DO;  Location: [facility] MAIN OR;  Service: General;  Laterality: N/A;    MASTECTOMY Right 02/04/2019    Invasive ductal carcinoma    TUBAL ABDOMINAL LIGATION         SLP Recommendation and Plan    Discussed results with pt and daughter. S/s aspiration with nearly all PO, concerns for pharyngeal impairment given x2 intubations and length of intubations. Discussed initiate ice chips only via modified [place] protocol, rationale, with both in agreement. Additionally discussed as pt opting for limited support/DNR/DNI would suggest reducing aspiration risk, considering temporary non-oral means and proceeding conservatively. Will plan re-evaluation for potential PO versus any additional workup required. All in agreement.     Recommend: NPO with non-oral means of nutrition/hydration/medication administration. Pt may have MINIMAL ice chips via modified polanco water protocol (see below).           SWALLOW EVALUATION (last 72 hours)       SLP Adult Swallow Evaluation       Row Name 10/28/23 1200       Rehab Evaluation    Document Type evaluation  -AB    Subjective Information no complaints  -AB    Patient Observations alert;cooperative;agree to therapy  -AB    Patient/Family/Caregiver Comments/Observations seen in 2312, daughter at bedside  -AB    Patient Effort good  -AB    Symptoms Noted During/After Treatment none  -AB       General Information    Patient Profile Reviewed yes  -AB    Pertinent History Of Current Problem 83 y.o. female with PMH of diabetes, hypertension, breast cancer presented to the  hospital after a fall and was admitted with a principal diagnosis of Acute hypoxemic respiratory failure.  On admission, patient required 2 L nasal cannula and subsequently had worsening oxygenation with hypercapnic respiratory failure.  Failed BiPAP and subsequently was intubated on 10/15.  Initially treated with broad-spectrum antibiotics and ID was consulted.  RVP positive for rhinovirus, sputum cultures were negative.  Treated with diuretics for fluid overload.  Developed profound hypoxia and needed deep sedation.  Had bronch on 10/18.  Oxygenation improved with aggressive diuretics.  Sputum cultures grew Klebsiella and treated with meropenem.  Subsequently, extubated on 10/25 but developed hypoxia afterwards, failed BiPAP and had to be reintubated.  vent 10.15-10.25; 10.25-10.27  -AB    Current Method of Nutrition NPO  -AB    Precautions/Limitations, Vision WFL  -AB    Precautions/Limitations, Hearing hearing impairment, bilaterally  -AB    Prior Level of Function-Communication WFL  -AB    Prior Level of Function-Swallowing regular textures;thin liquids  -AB    Plans/Goals Discussed with patient;family;agreed upon  -AB    Barriers to Rehab medically complex  -AB    Patient's Goals for Discharge return to regular diet  -AB    Family Goals for Discharge patient able to return to regular diet  -AB       Oral Motor Structure and Function    Oral Lesions or Structural Abnormalities and/or variants none  -AB    Dentition Assessment missing teeth;other (see comments)  has mandibular/maxillary partial plates per daughter verbal report, did not visualize and did not place for eval  -AB    Secretion Management WNL/WFL  -AB    Mucosal Quality dry  -AB    Gag Response WFL  -AB    Volitional Swallow weak  -AB    Volitional Cough weak  -AB       Oral Musculature and Cranial Nerve Assessment    Oral Motor General Assessment vocal impairment  -AB    Vocal Impairment, Detail. Cranial Nerve X (Vagus) vocal quality abnormality  "(see comments)  -AB       General Eating/Swallowing Observations    Respiratory Support Currently in Use nasal cannula  -AB    Eating/Swallowing Skills fed by SLP  -AB    Positioning During Eating upright 90 degree  -AB    Utensils Used spoon;straw  -AB    Consistencies Trialed ice chips;thin liquids;honey-thick liquids;pureed  -AB       Clinical Swallow Eval    Clinical Swallow Evaluation Summary Bedside swallow evaluation completed. SLP orders received s/p second extubation, extended vent support required 10.15-10.25; 10.25-10.27. Vocal quality with severe dysphonia, intermittent aphonia. Cough quality weak, nonproductive. Pt highly motivated for PO intake, currently NPO with no alternative means of nutrition. Daughter at bedside throughout. Adequate labial seal for tsp presentations. Appropriate manipulation of ice chips, complete oral clearance with all PO. Pt exhibits throat clear final 1/6 ice chips, 1/2 HTL. Cough following puree 2/2 tsp, 1/2 HTL by tsp, 2/2 water by tsp. Cough weak, nonproductive. Oral suction utilized with scant posterior secretions returned. Question pharyngeal awareness as pt states following puree \"it's hard to go down.\" Palpated swallow with questionable weakness, mildly difficult to assess d/t body habitus.     Discussed results with pt and daughter. S/s aspiration with nearly all PO, concerns for pharyngeal impairment given x2 intubations and length of intubations. Discussed initiate ice chips only via modified [place] Water protocol, rationale, with both in agreement. Additionally discussed as pt opting for limited support/DNR/DNI would suggest reducing aspiration risk, considering temporary non-oral means and proceeding conservatively. Will plan re-evaluation for potential PO versus any additional workup required. All in agreement.     Recommend: NPO with non-oral means of nutrition/hydration/medication administration. Pt may have MINIMAL ice chips via modified polanco water " protocol (see below).  -AB       SLP Evaluation Clinical Impression    SLP Swallowing Diagnosis suspected pharyngeal dysphagia  -AB    Functional Impact risk of aspiration/pneumonia;risk of malnutrition;risk of dehydration  -AB    Rehab Potential/Prognosis, Swallowing re-evaluate goals as necessary  -AB    Swallow Criteria for Skilled Therapeutic Interventions Met demonstrates skilled criteria  -AB       SLP Treatment Clinical Impressions    Care Plan Review evaluation/treatment results reviewed;risks/benefits reviewed;care plan/treatment goals reviewed;current/potential barriers reviewed;patient/other agree to care plan  -AB    Care Plan Review, Other Participant(s) daughter  -AB       Recommendations    Therapy Frequency (Swallow) PRN  -AB    Predicted Duration Therapy Intervention (Days) until discharge  -AB    SLP Diet Recommendation NPO;ice chips between meals after oral care, with supervision  -AB    Recommended Diagnostics reassess via clinical swallow evaluation;reassess via VFSS (MBS)  -AB    Recommended Precautions and Strategies general aspiration precautions  -AB    Oral Care Recommendations Oral Care BID/PRN;Before ice/water  -AB    SLP Rec. for Method of Medication Administration meds via alternate route  -AB    Monitor for Signs of Aspiration yes;notify SLP if any concerns  -AB    Anticipated Discharge Disposition (SLP) anticipate therapy at next level of care  -AB       Swallow Goals (SLP)    Swallow LTGs Swallow Long Term Goal (free text)  -AB    Swallow STGs diet tolerance goal selection (SLP)  -AB    Diet Tolerance Goal Selection (SLP) Swallow Short Term Goal 1  -AB       (LTG) Swallow    (LTG) Swallow pt will improve functional outcome measure score from FOIS LEVEL II (nothing by mouth, minimal attempts at PO) to FOIS LEVEL V or higher (total oral intake of multiple consistencies, requiring special preparation or compensations)  -AB    [place] (Swallow Long Term Goal) with 1:1 assist/  supervision  -AB    Time Frame (Swallow Long Term Goal) by discharge  -AB    Progress/Outcomes (Swallow Long Term Goal) new goal  -AB       (STG) Swallow 1    (STG) Swallow 1 pt will consume PO trials for diagnostic tx at bedside or under fluoroscopy for further assessment oropharyngeal swallow function status in 100% opps  -AB    [place] (Swallow Short Term Goal 1) independently (over 90% accuracy)  -AB    Time Frame (Swallow Short Term Goal 1) 1 week  -AB    Progress/Outcomes (Swallow Short Term Goal 1) new goal  -AB              User Key  (r) = Recorded By, (t) = Taken By, (c) = Cosigned By      Initials Name Effective Dates    Ping Sanz, MS CCC-SLP 12/27/22 -                     EDUCATION  The patient has been educated in the following areas:   Dysphagia (Swallowing Impairment) Oral Care/Hydration NPO rationale.        SLP GOALS       Row Name 10/28/23 1200             (LTG) Swallow    (LTG) Swallow pt will improve functional outcome measure score from FOIS LEVEL II (nothing by mouth, minimal attempts at PO) to FOIS LEVEL V or higher (total oral intake of multiple consistencies, requiring special preparation or compensations)  -AB      [place] (Swallow Long Term Goal) with 1:1 assist/ supervision  -AB      Time Frame (Swallow Long Term Goal) by discharge  -AB      Progress/Outcomes (Swallow Long Term Goal) new goal  -AB         (STG) Swallow 1    (STG) Swallow 1 pt will consume PO trials for diagnostic tx at bedside or under fluoroscopy for further assessment oropharyngeal swallow function status in 100% opps  -AB      [place] (Swallow Short Term Goal 1) independently (over 90% accuracy)  -AB      Time Frame (Swallow Short Term Goal 1) 1 week  -AB      Progress/Outcomes (Swallow Short Term Goal 1) new goal  -AB                User Key  (r) = Recorded By, (t) = Taken By, (c) = Cosigned By      Initials Name Provider Type    Ping Sanz, MS CCC-SLP Speech and Language Pathologist                        Time Calculation:       Therapy Charges for Today       Code Description Service Date Service Provider Modifiers Qty    81002817544 HC ST EVAL ORAL PHARYNG SWALLOW 4 10/28/2023 Ping Simpson, MS CCC-SLP GN 1                 Ping Simpson MS CCC-SLP  10/28/2023

## 2023-10-28 NOTE — PROGRESS NOTES
Nutrition Services  Patient Name: Diane Guan  YOB: 1940  MRN: [id number]  Admission date: 10/10/2023    PPE Documentation        PPE Worn By Provider Did not enter room for this encounter   PPE Worn By Patient  N/A     PROGRESS NOTE      Encounter Information: Progress note to check on TF.  Extubated 10/27.  SLP saw and recommends NPO.  NG tube remains in place.  Continues to receive TF.         PO Diet: NPO Diet NPO Type: Strict NPO   PO Supplements: None ordered    PO Intake:  NPO       Current nutrition support: Peptamen AF at 65 mL/hour + 75 mL/hour water flush    Nutrition support review: Documented as above per EMR       Labs (reviewed below): Reviewed, management per attending        GI Function:  Last documented BM 10/27 (yesterday)  Residuals WNL       Nutrition Intervention Updates: Continue Peptamen AF at goal rate of 65 mL/hr. Continue FWF.        Results from last 7 days   Lab Units 10/28/23  0625 10/27/23  0428 10/26/23  0310   SODIUM mmol/L 142 139 141   POTASSIUM mmol/L 4.5 3.9 3.6   CHLORIDE mmol/L 108* 103 103   CO2 mmol/L 23.0 26.0 26.0   BUN mg/dL 75* 101* 103*   CREATININE mg/dL 0.99 1.27* 1.38*   CALCIUM mg/dL 9.0 8.5* 9.0   BILIRUBIN mg/dL <0.2 <0.2 0.2   ALK PHOS U/L 123* 109 118*   ALT (SGPT) U/L 16 15 22   AST (SGOT) U/L 18 16 22   GLUCOSE mg/dL 135* 174* 84     Results from last 7 days   Lab Units 10/28/23  0625 10/27/23  0428 10/26/23  0310 10/24/23  0454 10/24/23  0307   MAGNESIUM mg/dL 2.7* 2.5* 2.3   < > 1.9   PHOSPHORUS mg/dL  --   --   --   --  2.8   HEMOGLOBIN g/dL  --  7.6* 7.9*   < >  --    HEMATOCRIT %  --  24.9* 25.7*   < >  --    TRIGLYCERIDES mg/dL  --  407* 223*   < >  --     < > = values in this interval not displayed.     COVID19   Date Value Ref Range Status   10/10/2023 Not Detected Not Detected - Ref. Range Final     Lab Results   Component Value Date    HGBA1C 6.50 (H) 10/13/2023     RD to follow up per protocol.    Electronically signed  by:  Vernell Gil RD  10/28/23 12:53 EDT

## 2023-10-29 ENCOUNTER — APPOINTMENT (OUTPATIENT)
Dept: GENERAL RADIOLOGY | Facility: HOSPITAL | Age: 83
DRG: 207 | End: 2023-10-29
Payer: MEDICARE

## 2023-10-29 LAB
ALBUMIN SERPL-MCNC: 3.2 G/DL (ref 3.5–5.2)
ALBUMIN/GLOB SERPL: 1 G/DL
ALP SERPL-CCNC: 137 U/L (ref 39–117)
ALT SERPL W P-5'-P-CCNC: 14 U/L (ref 1–33)
ANION GAP SERPL CALCULATED.3IONS-SCNC: 14 MMOL/L (ref 5–15)
ANISOCYTOSIS BLD QL: ABNORMAL
ANISOCYTOSIS BLD QL: ABNORMAL
AST SERPL-CCNC: 22 U/L (ref 1–32)
B PARAPERT DNA SPEC QL NAA+PROBE: NOT DETECTED
B PERT DNA SPEC QL NAA+PROBE: NOT DETECTED
BILIRUB SERPL-MCNC: 0.2 MG/DL (ref 0–1.2)
BUN SERPL-MCNC: 73 MG/DL (ref 8–23)
BUN/CREAT SERPL: 63.5 (ref 7–25)
C PNEUM DNA NPH QL NAA+NON-PROBE: NOT DETECTED
CALCIUM SPEC-SCNC: 9.3 MG/DL (ref 8.6–10.5)
CHLORIDE SERPL-SCNC: 102 MMOL/L (ref 98–107)
CO2 SERPL-SCNC: 26 MMOL/L (ref 22–29)
CREAT SERPL-MCNC: 1.15 MG/DL (ref 0.57–1)
DEPRECATED RDW RBC AUTO: 45.9 FL (ref 37–54)
DEPRECATED RDW RBC AUTO: 50.3 FL (ref 37–54)
EGFRCR SERPLBLD CKD-EPI 2021: 47.7 ML/MIN/1.73
ERYTHROCYTE [DISTWIDTH] IN BLOOD BY AUTOMATED COUNT: 15.2 % (ref 12.3–15.4)
ERYTHROCYTE [DISTWIDTH] IN BLOOD BY AUTOMATED COUNT: 15.6 % (ref 12.3–15.4)
FLUAV SUBTYP SPEC NAA+PROBE: NOT DETECTED
FLUBV RNA ISLT QL NAA+PROBE: NOT DETECTED
GIANT PLATELETS: ABNORMAL
GLOBULIN UR ELPH-MCNC: 3.1 GM/DL
GLUCOSE BLDC GLUCOMTR-MCNC: 155 MG/DL (ref 70–105)
GLUCOSE BLDC GLUCOMTR-MCNC: 158 MG/DL (ref 70–105)
GLUCOSE BLDC GLUCOMTR-MCNC: 169 MG/DL (ref 70–105)
GLUCOSE BLDC GLUCOMTR-MCNC: 170 MG/DL (ref 70–105)
GLUCOSE BLDC GLUCOMTR-MCNC: 171 MG/DL (ref 70–105)
GLUCOSE SERPL-MCNC: 176 MG/DL (ref 65–99)
HADV DNA SPEC NAA+PROBE: NOT DETECTED
HAPTOGLOB SERPL-MCNC: 472 MG/DL (ref 30–200)
HCOV 229E RNA SPEC QL NAA+PROBE: NOT DETECTED
HCOV HKU1 RNA SPEC QL NAA+PROBE: NOT DETECTED
HCOV NL63 RNA SPEC QL NAA+PROBE: NOT DETECTED
HCOV OC43 RNA SPEC QL NAA+PROBE: NOT DETECTED
HCT VFR BLD AUTO: 29.3 % (ref 34–46.6)
HCT VFR BLD AUTO: 30.8 % (ref 34–46.6)
HGB BLD-MCNC: 8.7 G/DL (ref 12–15.9)
HGB BLD-MCNC: 9.2 G/DL (ref 12–15.9)
HMPV RNA NPH QL NAA+NON-PROBE: NOT DETECTED
HOLD SPECIMEN: NORMAL
HOLD SPECIMEN: NORMAL
HPIV1 RNA ISLT QL NAA+PROBE: NOT DETECTED
HPIV2 RNA SPEC QL NAA+PROBE: NOT DETECTED
HPIV3 RNA NPH QL NAA+PROBE: NOT DETECTED
HPIV4 P GENE NPH QL NAA+PROBE: NOT DETECTED
LARGE PLATELETS: ABNORMAL
LDH SERPL-CCNC: 284 U/L (ref 135–214)
LYMPHOCYTES # BLD MANUAL: 35.09 10*3/MM3 (ref 0.7–3.1)
LYMPHOCYTES # BLD MANUAL: 53.46 10*3/MM3 (ref 0.7–3.1)
LYMPHOCYTES NFR BLD MANUAL: 1 % (ref 5–12)
M PNEUMO IGG SER IA-ACNC: NOT DETECTED
MAGNESIUM SERPL-MCNC: 2.6 MG/DL (ref 1.6–2.4)
MCH RBC QN AUTO: 26.1 PG (ref 26.6–33)
MCH RBC QN AUTO: 26.3 PG (ref 26.6–33)
MCHC RBC AUTO-ENTMCNC: 29.6 G/DL (ref 31.5–35.7)
MCHC RBC AUTO-ENTMCNC: 29.8 G/DL (ref 31.5–35.7)
MCV RBC AUTO: 88.1 FL (ref 79–97)
MCV RBC AUTO: 88.1 FL (ref 79–97)
MONOCYTES # BLD: 0.61 10*3/MM3 (ref 0.1–0.9)
NEUTROPHILS # BLD AUTO: 11.74 10*3/MM3 (ref 1.7–7)
NEUTROPHILS # BLD AUTO: 24.81 10*3/MM3 (ref 1.7–7)
NEUTROPHILS NFR BLD MANUAL: 18 % (ref 42.7–76)
NEUTROPHILS NFR BLD MANUAL: 39 % (ref 42.7–76)
NEUTS BAND NFR BLD MANUAL: 2 % (ref 0–5)
PLATELET # BLD AUTO: 509 10*3/MM3 (ref 140–450)
PLATELET # BLD AUTO: 568 10*3/MM3 (ref 140–450)
PMV BLD AUTO: 10.2 FL (ref 6–12)
PMV BLD AUTO: 9.7 FL (ref 6–12)
POIKILOCYTOSIS BLD QL SMEAR: ABNORMAL
POIKILOCYTOSIS BLD QL SMEAR: ABNORMAL
POLYCHROMASIA BLD QL SMEAR: ABNORMAL
POTASSIUM SERPL-SCNC: 4.1 MMOL/L (ref 3.5–5.2)
PROT SERPL-MCNC: 6.3 G/DL (ref 6–8.5)
RBC # BLD AUTO: 3.32 10*6/MM3 (ref 3.77–5.28)
RBC # BLD AUTO: 3.49 10*6/MM3 (ref 3.77–5.28)
RHINOVIRUS RNA SPEC NAA+PROBE: NOT DETECTED
RSV RNA NPH QL NAA+NON-PROBE: NOT DETECTED
SARS-COV-2 RNA NPH QL NAA+NON-PROBE: NOT DETECTED
SCAN SLIDE: NORMAL
SCAN SLIDE: NORMAL
SMALL PLATELETS BLD QL SMEAR: ABNORMAL
SMUDGE CELLS BLD QL SMEAR: ABNORMAL
SODIUM SERPL-SCNC: 142 MMOL/L (ref 136–145)
SPHEROCYTES BLD QL SMEAR: ABNORMAL
VARIANT LYMPHS NFR BLD MANUAL: 3 % (ref 0–5)
VARIANT LYMPHS NFR BLD MANUAL: 58 % (ref 19.6–45.3)
VARIANT LYMPHS NFR BLD MANUAL: 79 % (ref 19.6–45.3)
WBC MORPH BLD: NORMAL
WBC NRBC COR # BLD: 60.5 10*3/MM3 (ref 3.4–10.8)
WBC NRBC COR # BLD: 65.2 10*3/MM3 (ref 3.4–10.8)

## 2023-10-29 PROCEDURE — 80053 COMPREHEN METABOLIC PANEL: CPT | Performed by: STUDENT IN AN ORGANIZED HEALTH CARE EDUCATION/TRAINING PROGRAM

## 2023-10-29 PROCEDURE — 25010000002 MEROPENEM PER 100 MG: Performed by: INTERNAL MEDICINE

## 2023-10-29 PROCEDURE — 25010000002 FUROSEMIDE PER 20 MG: Performed by: INTERNAL MEDICINE

## 2023-10-29 PROCEDURE — 82785 ASSAY OF IGE: CPT | Performed by: INTERNAL MEDICINE

## 2023-10-29 PROCEDURE — 63710000001 INSULIN LISPRO (HUMAN) PER 5 UNITS: Performed by: INTERNAL MEDICINE

## 2023-10-29 PROCEDURE — 25010000002 ONDANSETRON PER 1 MG: Performed by: INTERNAL MEDICINE

## 2023-10-29 PROCEDURE — 82784 ASSAY IGA/IGD/IGG/IGM EACH: CPT | Performed by: INTERNAL MEDICINE

## 2023-10-29 PROCEDURE — 83615 LACTATE (LD) (LDH) ENZYME: CPT | Performed by: INTERNAL MEDICINE

## 2023-10-29 PROCEDURE — 82948 REAGENT STRIP/BLOOD GLUCOSE: CPT

## 2023-10-29 PROCEDURE — 25010000002 HYDROMORPHONE 1 MG/ML SOLUTION: Performed by: NURSE PRACTITIONER

## 2023-10-29 PROCEDURE — 74018 RADEX ABDOMEN 1 VIEW: CPT

## 2023-10-29 PROCEDURE — 94799 UNLISTED PULMONARY SVC/PX: CPT

## 2023-10-29 PROCEDURE — 25010000002 HYDRALAZINE PER 20 MG: Performed by: STUDENT IN AN ORGANIZED HEALTH CARE EDUCATION/TRAINING PROGRAM

## 2023-10-29 PROCEDURE — 85007 BL SMEAR W/DIFF WBC COUNT: CPT | Performed by: NURSE PRACTITIONER

## 2023-10-29 PROCEDURE — 83735 ASSAY OF MAGNESIUM: CPT | Performed by: STUDENT IN AN ORGANIZED HEALTH CARE EDUCATION/TRAINING PROGRAM

## 2023-10-29 PROCEDURE — 83010 ASSAY OF HAPTOGLOBIN QUANT: CPT | Performed by: INTERNAL MEDICINE

## 2023-10-29 PROCEDURE — 85025 COMPLETE CBC W/AUTO DIFF WBC: CPT | Performed by: NURSE PRACTITIONER

## 2023-10-29 PROCEDURE — 94660 CPAP INITIATION&MGMT: CPT

## 2023-10-29 PROCEDURE — 0202U NFCT DS 22 TRGT SARS-COV-2: CPT | Performed by: INTERNAL MEDICINE

## 2023-10-29 PROCEDURE — 99222 1ST HOSP IP/OBS MODERATE 55: CPT | Performed by: INTERNAL MEDICINE

## 2023-10-29 PROCEDURE — 25010000002 LORAZEPAM PER 2 MG: Performed by: INTERNAL MEDICINE

## 2023-10-29 PROCEDURE — 71045 X-RAY EXAM CHEST 1 VIEW: CPT

## 2023-10-29 PROCEDURE — 85007 BL SMEAR W/DIFF WBC COUNT: CPT | Performed by: INTERNAL MEDICINE

## 2023-10-29 PROCEDURE — 85025 COMPLETE CBC W/AUTO DIFF WBC: CPT | Performed by: INTERNAL MEDICINE

## 2023-10-29 RX ORDER — FERROUS SULFATE 300 MG/5ML
300 LIQUID (ML) ORAL 3 TIMES WEEKLY
Status: DISCONTINUED | OUTPATIENT
Start: 2023-10-30 | End: 2023-11-06 | Stop reason: HOSPADM

## 2023-10-29 RX ORDER — PANTOPRAZOLE SODIUM 40 MG/10ML
40 INJECTION, POWDER, LYOPHILIZED, FOR SOLUTION INTRAVENOUS EVERY 12 HOURS SCHEDULED
Status: DISCONTINUED | OUTPATIENT
Start: 2023-10-30 | End: 2023-11-01

## 2023-10-29 RX ORDER — FUROSEMIDE 10 MG/ML
40 INJECTION INTRAMUSCULAR; INTRAVENOUS EVERY 8 HOURS
Qty: 8 ML | Refills: 0 | Status: COMPLETED | OUTPATIENT
Start: 2023-10-29 | End: 2023-10-29

## 2023-10-29 RX ORDER — PANTOPRAZOLE SODIUM 40 MG/10ML
80 INJECTION, POWDER, LYOPHILIZED, FOR SOLUTION INTRAVENOUS ONCE
Status: COMPLETED | OUTPATIENT
Start: 2023-10-29 | End: 2023-10-29

## 2023-10-29 RX ADMIN — LORAZEPAM 0.5 MG: 2 INJECTION INTRAMUSCULAR; INTRAVENOUS at 13:31

## 2023-10-29 RX ADMIN — HYDRALAZINE HYDROCHLORIDE 10 MG: 20 INJECTION INTRAMUSCULAR; INTRAVENOUS at 06:24

## 2023-10-29 RX ADMIN — ONDANSETRON 4 MG: 2 INJECTION INTRAMUSCULAR; INTRAVENOUS at 22:14

## 2023-10-29 RX ADMIN — LORAZEPAM 0.5 MG: 2 INJECTION INTRAMUSCULAR; INTRAVENOUS at 21:46

## 2023-10-29 RX ADMIN — Medication 10 ML: at 11:01

## 2023-10-29 RX ADMIN — MEROPENEM 1000 MG: 1 INJECTION, POWDER, FOR SOLUTION INTRAVENOUS at 05:27

## 2023-10-29 RX ADMIN — INSULIN LISPRO 4 UNITS: 100 INJECTION, SOLUTION INTRAVENOUS; SUBCUTANEOUS at 00:45

## 2023-10-29 RX ADMIN — SERTRALINE 150 MG: 100 TABLET, FILM COATED ORAL at 10:59

## 2023-10-29 RX ADMIN — PANTOPRAZOLE SODIUM 80 MG: 40 INJECTION, POWDER, LYOPHILIZED, FOR SOLUTION INTRAVENOUS at 22:13

## 2023-10-29 RX ADMIN — Medication 10 ML: at 22:20

## 2023-10-29 RX ADMIN — FUROSEMIDE 40 MG: 10 INJECTION, SOLUTION INTRAMUSCULAR; INTRAVENOUS at 22:13

## 2023-10-29 RX ADMIN — HYDROMORPHONE HYDROCHLORIDE 0.5 MG: 1 INJECTION, SOLUTION INTRAMUSCULAR; INTRAVENOUS; SUBCUTANEOUS at 04:41

## 2023-10-29 RX ADMIN — FUROSEMIDE 40 MG: 10 INJECTION, SOLUTION INTRAMUSCULAR; INTRAVENOUS at 06:14

## 2023-10-29 RX ADMIN — HYDRALAZINE HYDROCHLORIDE 10 MG: 20 INJECTION INTRAMUSCULAR; INTRAVENOUS at 00:45

## 2023-10-29 RX ADMIN — MEROPENEM 1000 MG: 1 INJECTION, POWDER, FOR SOLUTION INTRAVENOUS at 21:53

## 2023-10-29 NOTE — CONSULTS
Hematology/Oncology Inpatient Consultation    Patient name: Diane Guan  : 1940  MRN: [id number]  Referring Provider: Dr. Myers.  Reason for Consultation: Chronic lymphocytic leukemia.     Chief complaint: Falls. Respiratory failure. Leukocytosis with lymphocytosis.     History of present illness:    Diane Guan is a 83 y.o. female who presented to [facility] on 10/10/2023 with complaints of     MsZachery Guan was admitted to the hospital on 10/10/2023. She had fallen, sustaining some trauma to the chest. She complained of weakness of the right lower extremity. In addition she had a respiratory panel positive for rhinovirus. She had been treated in the past for early stage breast cancer and had remained free of evidence of recurrent disease. In addition she was known for chronic lymphocytic leukemia and had maintained significant lymphocytosis for some time but had never needed treatment. At the time of the admission she had maintained a similar white cell count. Today for the first time she had a very significant increase in the total white cell count and platelets. In addition she had significant thrombocytosis.   In spite of all efforts she had very slow improvement. She had continued to have difficulties with weakness and anorexia. She developed pneumonia and had to be intubated; she was finally extubated on 10/27.  On 10/23/2023 she was found to have positive blood cultures for E coli and Klebsiella. At the time of this consult she was still on antibiotics. She also had had evidence of gastrointestinal bleeding; she was diagnosed with iron deficiency and was started on iron but iron studies were not available.     He/She  has a past medical history of Acute bronchitis due to human metapneumovirus (2020), Anxiety disorder, Arthritis, Breast cancer (2019), Chronic lymphocytic leukemia (CLL), B-cell (2019), Depression, Disease of thyroid gland, Diverticulosis of  colon (2018), Dysphagia (12/2020), Dyspnea on exertion, Fall at home (10/11/2023), Hemorrhoid, Hiatal hernia (12/2020), Hiatal hernia with GERD, History of breast cancer (10/11/2023), History of cigarette smoking (10/11/2023), History of diabetes mellitus, Hyperlipidemia, Hypertension, Mycobacterium avium complex (02/2019), OAB (overactive bladder), Personal history of CLL (chronic lymphocytic leukemia) (10/11/2023), Skin cancer, and Sleep apnea.    PCP: Asia Queen APRN    History:  Past Medical History:   Diagnosis Date    Acute bronchitis due to human metapneumovirus 02/08/2020    Anxiety disorder     Arthritis     Breast cancer 02/2019    Invasive ductal carcinoma    Chronic lymphocytic leukemia (CLL), B-cell 01/2019    Depression     Disease of thyroid gland     Diverticulosis of colon 2018    Identified on colonoscopy    Dysphagia 12/2020    Dyspnea on exertion     Fall at home 10/11/2023    Hemorrhoid     Hiatal hernia 12/2020    Hiatal hernia with GERD     large hiatal hernia with high-grade reflux on barium swallow; s/p laparoscopic fundoplication with gastropexy    History of breast cancer 10/11/2023    History of cigarette smoking 10/11/2023    History of diabetes mellitus     no meds now    Hyperlipidemia     Hypertension     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    aspiration pneumonia; completed abx therapy    OAB (overactive bladder)     Personal history of CLL (chronic lymphocytic leukemia) 10/11/2023    Skin cancer     Sleep apnea     daughter states pt does not have and doesn't have machine   ,   Past Surgical History:   Procedure Laterality Date    BLADDER SURGERY      BREAST BIOPSY      BRONCHOSCOPY N/A 09/13/2019    Procedure: BRONCHOSCOPY with bronchial washing;  Surgeon: Marnie Frankel MD;  Location: [facility] ENDOSCOPY;  Service: Pulmonary    BRONCHOSCOPY Bilateral 10/18/2023    Procedure: BRONCHOSCOPY AT BEDSIDE;  Surgeon: Vinicius Heredia DO;  Location: [facility] ENDOSCOPY;  Service:  Pulmonary;  Laterality: Bilateral;    COLONOSCOPY  2018    severe diverticulosis    CYST REMOVAL      Removed a fatty cyst off of her back    ENDOSCOPY N/A 2020    Procedure: ESOPHAGOGASTRODUODENOSCOPY with dilatation (Bougie # 48, 50, 52, 54, 56, 58);  Surgeon: Den Plummer DO;  Location: [facility] ENDOSCOPY;  Service: General;  Laterality: N/A;  Post: large hiatal hernia, joshua's ulcers    HIATAL HERNIA REPAIR N/A 2020    Procedure: Laparoscopic hiatal hernia repair with gastropexy;  Surgeon: Den Plummer DO;  Location: [facility] MAIN OR;  Service: General;  Laterality: N/A;    MASTECTOMY Right 2019    Invasive ductal carcinoma    TUBAL ABDOMINAL LIGATION     ,   Family History   Problem Relation Age of Onset    Diabetes Mother     Arthritis Other     Heart disease Other    ,   Social History     Tobacco Use    Smoking status: Former     Packs/day: 0.50     Years: 2.00     Additional pack years: 0.00     Total pack years: 1.00     Types: Cigarettes     Start date: 1990     Quit date: 1992     Years since quittin.8    Smokeless tobacco: Never   Vaping Use    Vaping Use: Never used   Substance Use Topics    Alcohol use: No    Drug use: No   ,   Medications Prior to Admission   Medication Sig Dispense Refill Last Dose    amLODIPine (NORVASC) 10 MG tablet Take 1 tablet by mouth Daily. 90 tablet 1     anastrozole (ARIMIDEX) 1 MG tablet Take 1 tablet by mouth Daily. 90 tablet 3     cetirizine (zyrTEC) 10 MG tablet Take 1 tablet by mouth every night at bedtime.       chlorthalidone (HYGROTON) 25 MG tablet TAKE 1 TABLET BY MOUTH EVERY DAY 90 tablet 1     docusate sodium (COLACE) 100 MG capsule Take 1 capsule by mouth 2 (Two) Times a Day.  3     ferrous sulfate 324 (65 Fe) MG tablet delayed-release EC tablet Take 1 tablet by mouth 3 (Three) Times a Week.       irbesartan (AVAPRO) 300 MG tablet TAKE 1 TABLET BY MOUTH EVERY DAY 90 tablet 1     levothyroxine (SYNTHROID, LEVOTHROID) 100  MCG tablet One tab po q day 90 tablet 3     metFORMIN (GLUCOPHAGE) 500 MG tablet Take 1 tablet by mouth Daily With Breakfast. 90 tablet 3     metoclopramide (REGLAN) 5 MG tablet Take 1 tablet by mouth 3 (Three) Times a Day As Needed (for nausea and vomiting). 270 tablet 3     mirtazapine (REMERON) 15 MG tablet Take 1 tablet by mouth every night at bedtime. 90 tablet 1     pantoprazole (PROTONIX) 40 MG EC tablet TAKE 1 TABLET BY MOUTH EVERY MORNING BEFORE BREAKFAST. TAKE ON AN EMPTY STOMACH! 90 tablet 1     rOPINIRole (REQUIP) 0.25 MG tablet Take 1 tablet by mouth Every Night. Take 1 hour before bedtime. 90 tablet 3     rosuvastatin (CRESTOR) 10 MG tablet Take 1 tablet by mouth Every Night. 90 tablet 3     sertraline (ZOLOFT) 100 MG tablet Take 1.5 tablets by mouth Daily. 45 tablet 11     Accu-Chek Softclix Lancets lancets 1 each by Other route Daily. Use as instructed 100 each 0     Alcohol Swabs ([employer] Alcohol Prep Pads) 70 % pads APPLY 1 EACH TOPICALLY DAILY.       glucose blood (Accu-Chek Guide) test strip 1 each by Other route Daily. Use as instructed 100 each 0     Hearing Aid Batteries misc 1 Units Every 7 (Seven) Days. 52 each 0     Isopropyl Alcohol (Alcohol Wipes) 70 % misc Apply 1 each topically Daily. 100 each 0    , Scheduled Meds:  [Held by provider] amLODIPine, 10 mg, Nasogastric, Q24H  chlorthalidone, 25 mg, Nasogastric, Daily  enoxaparin, 40 mg, Subcutaneous, Daily  [START ON 10/30/2023] Ferrous Sulfate, 300 mg, Nasogastric, Once per day on Mon Wed Fri  furosemide, 40 mg, Intravenous, Q8H  insulin lispro, 4-24 Units, Subcutaneous, Q6H  lansoprazole, 30 mg, Nasogastric, Q AM  levothyroxine, 100 mcg, Nasogastric, Q AM  meropenem, 1,000 mg, Intravenous, Q12H  rOPINIRole, 0.25 mg, Nasogastric, Nightly  rosuvastatin, 10 mg, Nasogastric, Nightly  senna-docusate sodium, 2 tablet, Nasogastric, BID  sertraline, 150 mg, Nasogastric, Daily  sodium chloride, 10 mL, Intravenous, Q12H  sodium chloride, 10 mL,  "Intravenous, Q12H    , Continuous Infusions:   , PRN Meds:    acetaminophen    cyclobenzaprine    dextrose    dextrose    dextrose    glucagon (human recombinant)    hydrALAZINE    HYDROmorphone    ipratropium-albuterol    lidocaine    LORazepam    nitroglycerin    ondansetron    oxyCODONE    polyethylene glycol    prochlorperazine    [COMPLETED] Insert Peripheral IV **AND** sodium chloride    sodium chloride    sodium chloride    sodium chloride    sodium chloride   Allergies:  Patient has no known allergies.    Subjective   10/29/2023: Perhaps better. Somnolent. Not able to give me many details.     ROS:  Review of Systems   Unable to perform ROS: Mental status change      Objective   Vital Signs:   /72   Pulse 78   Temp 98.8 °F (37.1 °C) (Oral)   Resp 21   Ht 160 cm (63\")   Wt 78.9 kg (173 lb 15.1 oz)   SpO2 95%   BMI 30.81 kg/m²     Physical Exam: (performed by MD)  Physical Exam  Constitutional:       General: She is not in acute distress.     Appearance: She is not ill-appearing, toxic-appearing or diaphoretic.      Comments: Prostrated. Appears ill. Profoundly somnolent. Wakes up briefly. Pale and well hydrated.    HENT:      Head: Normocephalic and atraumatic.      Right Ear: External ear normal.      Left Ear: External ear normal.      Nose: Nose normal.      Mouth/Throat:      Mouth: Mucous membranes are moist.      Pharynx: Oropharynx is clear. No oropharyngeal exudate or posterior oropharyngeal erythema.   Eyes:      General: No scleral icterus.        Right eye: No discharge.         Left eye: No discharge.      Conjunctiva/sclera: Conjunctivae normal.      Pupils: Pupils are equal, round, and reactive to light.   Cardiovascular:      Rate and Rhythm: Normal rate and regular rhythm.      Pulses: Normal pulses.      Heart sounds: No murmur heard.     No friction rub. No gallop.   Pulmonary:      Effort: No respiratory distress.      Breath sounds: No stridor. No wheezing, rhonchi or rales. "   Abdominal:      General: Bowel sounds are normal. There is no distension.      Palpations: Abdomen is soft. There is no mass.      Tenderness: There is no abdominal tenderness. There is no right CVA tenderness, left CVA tenderness, guarding or rebound.      Hernia: No hernia is present.      Comments: Soft to palpation. Does not appear tender.    Musculoskeletal:         General: No swelling, tenderness, deformity or signs of injury.      Cervical back: No rigidity.      Right lower leg: No edema.      Left lower leg: No edema.   Lymphadenopathy:      Cervical: No cervical adenopathy.   Skin:     Coloration: Skin is not jaundiced.      Findings: No bruising, lesion or rash.   Neurological:      General: No focal deficit present.      Mental Status: She is alert and oriented to person, place, and time.      Cranial Nerves: No cranial nerve deficit.      Motor: No weakness.      Gait: Gait normal.   Psychiatric:         Mood and Affect: Mood normal.         Behavior: Behavior normal.         Thought Content: Thought content normal.         Judgment: Judgment normal.     TOÑO Ledesma MD performed the physical exam on 10/29/2023 as documented above.     Results Review:  Lab Results (last 48 hours)       Procedure Component Value Units Date/Time    Respiratory Panel PCR w/COVID-19(SARS-CoV-2) LESLEE/VIVIANE/PAVAN/PAD/COR/MAD/JUAN DAVID In-House, NP Swab in UTM/VTM, 3-4 HR TAT - Swab, Nasopharynx [[id number]] Collected: 10/29/23 1113    Specimen: Swab from Nasopharynx Updated: 10/29/23 1125    POC Glucose Once [[id number]]  (Abnormal) Collected: 10/29/23 1100    Specimen: Blood Updated: 10/29/23 1105     Glucose 155 mg/dL      Comment: Serial Number: [id number]:  [id number]       CBC & Differential [[id number]]  (Abnormal) Collected: 10/29/23 0800    Specimen: Blood Updated: 10/29/23 0909    Narrative:      The following orders were created for panel order CBC & Differential.  Procedure                               Abnormality          Status                     ---------                               -----------         ------                     CBC Auto Differential[[id number]]        Abnormal            Final result               Scan Slide[[id number]]                                       Final result                 Please view results for these tests on the individual orders.    Scan Slide [[id number]] Collected: 10/29/23 0800    Specimen: Blood Updated: 10/29/23 0909     Scan Slide --     Comment: See Manual Differential Results       Manual Differential [[id number]]  (Abnormal) Collected: 10/29/23 0800    Specimen: Blood Updated: 10/29/23 0909     Neutrophil % 18.0 %      Lymphocyte % 79.0 %      Atypical Lymphocyte % 3.0 %      Neutrophils Absolute 11.74 10*3/mm3      Lymphocytes Absolute 53.46 10*3/mm3      Anisocytosis Slight/1+     Poikilocytes Slight/1+     Polychromasia Slight/1+     Spherocytes Slight/1+     WBC Morphology Normal     Large Platelets Slight/1+     Giant Platelets Slight/1+    Narrative:      Path review conducted on 10/11/23    CBC Auto Differential [[id number]]  (Abnormal) Collected: 10/29/23 0800    Specimen: Blood Updated: 10/29/23 0909     WBC 65.20 10*3/mm3      RBC 3.49 10*6/mm3      Hemoglobin 9.2 g/dL      Hematocrit 30.8 %      MCV 88.1 fL      MCH 26.3 pg      MCHC 29.8 g/dL      RDW 15.2 %      RDW-SD 45.9 fl      MPV 9.7 fL      Platelets 568 10*3/mm3     POC Glucose Once [[id number]]  (Abnormal) Collected: 10/29/23 0603    Specimen: Blood Updated: 10/29/23 0605     Glucose 169 mg/dL      Comment: Serial Number: [id number]:  [id number]       Manual Differential [[id number]]  (Abnormal) Collected: 10/29/23 0324    Specimen: Blood Updated: 10/29/23 0529     Neutrophil % 39.0 %      Lymphocyte % 58.0 %      Monocyte % 1.0 %      Bands %  2.0 %      Neutrophils Absolute 24.81 10*3/mm3      Lymphocytes Absolute 35.09 10*3/mm3      Monocytes Absolute 0.61 10*3/mm3      Anisocytosis Slight/1+      Poikilocytes Slight/1+     Smudge Cells Slight/1+     Platelet Estimate Increased    Narrative:      Reviewed by Pathologist within the past 30 days on 10/10/23 .      CBC & Differential [[id number]]  (Abnormal) Collected: 10/29/23 0324    Specimen: Blood Updated: 10/29/23 0529    Narrative:      The following orders were created for panel order CBC & Differential.  Procedure                               Abnormality         Status                     ---------                               -----------         ------                     CBC Auto Differential[[id number]]        Abnormal            Final result               Scan Slide[[id number]]                                       Final result                 Please view results for these tests on the individual orders.    CBC Auto Differential [[id number]]  (Abnormal) Collected: 10/29/23 0324    Specimen: Blood Updated: 10/29/23 0529     WBC 60.50 10*3/mm3      RBC 3.32 10*6/mm3      Hemoglobin 8.7 g/dL      Hematocrit 29.3 %      MCV 88.1 fL      MCH 26.1 pg      MCHC 29.6 g/dL      RDW 15.6 %      RDW-SD 50.3 fl      MPV 10.2 fL      Platelets 509 10*3/mm3     Narrative:      The previously reported component NRBC is no longer being reported. Previous result was 0.0 /100 WBC (Reference Range: 0.0-0.2 /100 WBC) on 10/29/2023 at [facility] EDT.    Scan Slide [[id number]] Collected: 10/29/23 0324    Specimen: Blood Updated: 10/29/23 0529     Scan Slide --     Comment: See Manual Differential Results       Magnesium [[id number]]  (Abnormal) Collected: 10/29/23 0121    Specimen: Blood Updated: 10/29/23 0202     Magnesium 2.6 mg/dL     Comprehensive Metabolic Panel [[id number]]  (Abnormal) Collected: 10/29/23 0121    Specimen: Blood Updated: 10/29/23 0202     Glucose 176 mg/dL      BUN 73 mg/dL      Creatinine 1.15 mg/dL      Sodium 142 mmol/L      Potassium 4.1 mmol/L      Chloride 102 mmol/L      CO2 26.0 mmol/L      Calcium 9.3 mg/dL      Total Protein 6.3 g/dL      Albumin  3.2 g/dL      ALT (SGPT) 14 U/L      AST (SGOT) 22 U/L      Alkaline Phosphatase 137 U/L      Total Bilirubin 0.2 mg/dL      Globulin 3.1 gm/dL      A/G Ratio 1.0 g/dL      BUN/Creatinine Ratio 63.5     Anion Gap 14.0 mmol/L      eGFR 47.7 mL/min/1.73     Narrative:      GFR Normal >60  Chronic Kidney Disease <60  Kidney Failure <15    The GFR formula is only valid for adults with stable renal function between ages 18 and 70.    POC Glucose Once [[id number]]  (Abnormal) Collected: 10/29/23 0013    Specimen: Blood Updated: 10/29/23 0015     Glucose 170 mg/dL      Comment: Serial Number: [id number]:  [id number]       POC Glucose Once [[id number]]  (Abnormal) Collected: 10/28/23 1718    Specimen: Blood Updated: 10/28/23 1719     Glucose 150 mg/dL      Comment: Serial Number: [id number]:  [id number]       Blood Culture - Blood, Arm, Left [[id number]]  (Normal) Collected: 10/23/23 1611    Specimen: Blood from Arm, Left Updated: 10/28/23 1631     Blood Culture No growth at 5 days    Blood Culture - Blood, Hand, Left [[id number]]  (Normal) Collected: 10/23/23 1612    Specimen: Blood from Hand, Left Updated: 10/28/23 1631     Blood Culture No growth at 5 days    CBC & Differential [[id number]]  (Abnormal) Collected: 10/28/23 1344    Specimen: Blood Updated: 10/28/23 1431    Narrative:      The following orders were created for panel order CBC & Differential.  Procedure                               Abnormality         Status                     ---------                               -----------         ------                     CBC Auto Differential[[id number]]        Abnormal            Final result               Scan Slide[[id number]]                                       Final result                 Please view results for these tests on the individual orders.    CBC Auto Differential [[id number]]  (Abnormal) Collected: 10/28/23 1344    Specimen: Blood Updated: 10/28/23 1431     WBC 26.10 10*3/mm3      RBC 3.44  10*6/mm3      Hemoglobin 9.4 g/dL      Hematocrit 29.9 %      MCV 87.0 fL      MCH 27.2 pg      MCHC 31.3 g/dL      RDW 15.4 %      RDW-SD 45.9 fl      MPV 10.4 fL      Platelets 316 10*3/mm3     Narrative:      The previously reported component NRBC is no longer being reported. Previous result was 0.1 /100 WBC (Reference Range: 0.0-0.2 /100 WBC) on 10/28/2023 at 1403 EDT.    Scan Slide [[id number]] Collected: 10/28/23 1344    Specimen: Blood Updated: 10/28/23 1431     Scan Slide --     Comment: See Manual Differential Results       Manual Differential [[id number]]  (Abnormal) Collected: 10/28/23 1344    Specimen: Blood Updated: 10/28/23 1431     Neutrophil % 41.0 %      Lymphocyte % 54.0 %      Monocyte % 1.0 %      Eosinophil % 2.0 %      Bands %  2.0 %      Neutrophils Absolute 11.22 10*3/mm3      Lymphocytes Absolute 14.09 10*3/mm3      Monocytes Absolute 0.26 10*3/mm3      Eosinophils Absolute 0.52 10*3/mm3      Anisocytosis Slight/1+     Smudge Cells Mod/2+     Large Platelets Slight/1+    POC Glucose Once [[id number]]  (Abnormal) Collected: 10/28/23 1204    Specimen: Blood Updated: 10/28/23 1205     Glucose 136 mg/dL      Comment: Serial Number: [id number]:  [id number]       Magnesium [[id number]]  (Abnormal) Collected: 10/28/23 0625    Specimen: Blood Updated: 10/28/23 0720     Magnesium 2.7 mg/dL     Comprehensive Metabolic Panel [[id number]]  (Abnormal) Collected: 10/28/23 0625    Specimen: Blood Updated: 10/28/23 0720     Glucose 135 mg/dL      BUN 75 mg/dL      Creatinine 0.99 mg/dL      Sodium 142 mmol/L      Potassium 4.5 mmol/L      Comment: Slight hemolysis detected by analyzer. Results may be affected.        Chloride 108 mmol/L      CO2 23.0 mmol/L      Calcium 9.0 mg/dL      Total Protein 5.5 g/dL      Albumin 3.0 g/dL      ALT (SGPT) 16 U/L      AST (SGOT) 18 U/L      Alkaline Phosphatase 123 U/L      Total Bilirubin <0.2 mg/dL      Globulin 2.5 gm/dL      A/G Ratio 1.2 g/dL      BUN/Creatinine  Ratio 75.8     Anion Gap 11.0 mmol/L      eGFR 57.0 mL/min/1.73     Narrative:      GFR Normal >60  Chronic Kidney Disease <60  Kidney Failure <15    The GFR formula is only valid for adults with stable renal function between ages 18 and 70.    POC Glucose Once [[id number]]  (Abnormal) Collected: 10/27/23 2358    Specimen: Blood Updated: 10/27/23 2359     Glucose 142 mg/dL      Comment: Serial Number: [id number]:  [id number]       POC Glucose Once [[id number]]  (Abnormal) Collected: 10/27/23 1837    Specimen: Blood Updated: 10/27/23 1839     Glucose 140 mg/dL      Comment: Serial Number: [id number]:  [id number]       POC Glucose Once [[id number]]  (Abnormal) Collected: 10/27/23 1328    Specimen: Blood Updated: 10/27/23 1330     Glucose 174 mg/dL      Comment: Serial Number: [id number]:  [id number]              Imaging Reviewed:   XR Chest 1 View    Result Date: 10/29/2023  Impression: Persistent bibasilar airspace disease and medial right basilar atelectasis. Electronically Signed: Miguel Trent MD  10/29/2023 1:01 AM EDT  Workstation ID: [id number]    XR Abdomen KUB    Result Date: 10/28/2023  Impression: 1. Smallbore feeding tube with tip terminating at the distal stomach or proximal duodenum. Electronically Signed: Antony Lopez  10/28/2023 12:23 PM EDT  Workstation ID: [id number]    XR Chest 1 View    Result Date: 10/28/2023  Impression: Interval extubation and removal of NG/OG tube. Stable cardiomediastinal silhouette within normal limits. There is similar haziness at the left lung base, likely small left pleural effusion. There is a new thin opacity at the right lung base, favor atelectasis. No pneumothorax. Electronically Signed: Mark Rodriguez MD  10/28/2023 8:07 AM EDT  Workstation ID: [id number]    XR Chest 1 View    Result Date: 10/27/2023  Impression: 1. Stable ET tube and esophagogastric tube. 2. Persistent left basilar airspace disease which may relate to atelectasis and/or pneumonia with  small left pleural effusion. 3. Improving right basilar atelectasis. Electronically Signed: Pavan Amezquita MD  10/27/2023 7:08 AM EDT  Workstation ID: [id number]    XR Chest 1 View    Result Date: 10/26/2023  Impression: Stable left lower lobe airspace disease with a left pleural effusion Life-support structures as above Electronically Signed: Dwight Arredondo MD  10/26/2023 7:36 AM EDT  Workstation ID: [id number]    XR Chest 1 View    Result Date: 10/25/2023  Impression: Endotracheal tube terminates in the upper thoracic trachea. Left basilar opacity with trace left pleural effusion. This may represent atelectasis or infection. Electronically Signed: Brant Aguirre MD  10/25/2023 8:53 PM EDT  Workstation ID: [id number]    XR Chest 1 View    Result Date: 10/25/2023  Impression: 1. Stable ET tube and esophagogastric tube. 2. Hypoinflated lungs with mild left basilar airspace disease likely atelectasis. 3. No pneumothorax. 4. Left upper extremity PICC removed. Electronically Signed: Pavan Amezquita MD  10/25/2023 7:17 AM EDT  Workstation ID: [id number]    XR Chest 1 View    Result Date: 10/24/2023  Impression: Tubes and lines appear properly positioned. There is cardiomegaly with mild pulmonary vascular congestion. Electronically Signed: David Pan MD  10/24/2023 2:10 AM EDT  Workstation ID: [id number]    XR Chest 1 View    Result Date: 10/23/2023  Impression: 1. Lines and tubes appear stable 2. Mild vascular crowding and dependent opacities compatible with atelectasis at the lung bases. No great change from comparison given differences in technique Electronically Signed: Fritz Valdivia MD  10/23/2023 7:43 AM EDT  Workstation ID: [id number]    XR Chest 1 View    Result Date: 10/22/2023  Impression: No significant interval change noted. Electronically Signed: Yohan Merida MD  10/22/2023 8:01 AM CDT  Workstation ID: [id number]          Assessment & Plan   ASSESSMENT  Chronic lymphocytic leukemia. This was first diagnosed at the end  of 2022 and she has not needed treatment since the diagnosis. Her condition is complicated at this time by the severity of the present illness. The sudden and marked increase in the white cell count most likely does not represent worsening of the malignant disease but rather is likely connected to the present illness. At this time, other than additional investigations and support I don't believe she needs intervention. One strong possibility is of hemolytic anemia and I will investigate. 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   Reviewed all the records. Reviewed all the laboratory exams.     PLAN  As above.     Electronically signed by Hugo Ledesma MD, 10/29/23, 11:49 AM EDT.

## 2023-10-29 NOTE — PROGRESS NOTES
[facility] Medicine Services   Daily Progress Note    Patient Name: Diane Guan  : 1940  MRN: [id number]  Primary Care Physician:  Asia Queen, SIOMARA  Date of admission: 10/10/2023    Subjective      Chief Complaint: Patient came in after a fall with right rib and right-sided back pain.     History of Present Illness: Diane Guan is a 83 y.o. female with past medical history of DM 2, hypertension and breast cancer who presented to [facility] on 10/10/2023 complaining of fall in the bathroom today with hitting her right side of the rib cage and right back with pain.  She says that her right leg has a little problem giving away at times and gave her while she was in the restroom today and she fell hitting her right side of the chest wall and right back on the commode.  She had pretty significant pain and that is what brought her here.  She says that she had some cough going on for about 3 weeks and was given a Z-Erick with which her cough is getting better.  She denies any fever chills shortness of breath nausea vomiting diarrhea constipation abdominal pain hematemesis hematochezia melena hemoptysis dysuria or hematuria.  Patient received a dose of Dilaudid in the ER and then her oxygen saturation dropped and she had to be started on 2 L of oxygen by nasal cannula.  I believe the Dilaudid caused some respiratory depression on top of her difficulty to take a deep breath anyways because of the pain secondary to fall has caused this acute respiratory failure.       10/29/2023 patient seen and examined in bed no acute distress, family at bedside, blood pressure 180/72.  Dyspnea on 15 L.  We were able to decrease oxygen to 8 L saturation remained 94%.  Discussed with RN.  Tolerating antibiotics.  Afebrile,    Review of Systems   Constitutional: Negative for chills, fever and night sweats.   HENT:  Positive for congestion. Negative for hoarse voice and sore throat.    Eyes:   Negative for blurred vision and double vision.   Cardiovascular:  Positive for chest pain. Negative for dyspnea on exertion, leg swelling, near-syncope, orthopnea, palpitations and syncope.   Respiratory:  Positive for cough. Negative for hemoptysis, shortness of breath, sputum production and wheezing.    Endocrine: Negative for cold intolerance and heat intolerance.   Skin:  Negative for itching and rash.   Musculoskeletal:  Positive for back pain and myalgias. Negative for neck pain and stiffness.        Patient had a fall today and last fall was about a year ago.   Gastrointestinal:  Negative for abdominal pain, constipation, diarrhea, hematemesis, hematochezia, melena, nausea and vomiting.   Genitourinary:  Negative for dysuria, frequency, hematuria and urgency.   Neurological:  Negative for excessive daytime sleepiness, dizziness, focal weakness, headaches, light-headedness, loss of balance and weakness.   Psychiatric/Behavioral:  Negative for altered mental status, depression and hallucinations.       Objective      Vitals:   Temp:  [97.9 °F (36.6 °C)-98.8 °F (37.1 °C)] 98.8 °F (37.1 °C)  Heart Rate:  [78-86] 78  Resp:  [19-24] 21  BP: (130-180)/(46-74) 180/72  Flow (L/min):  [15] 15    Physical Exam     Constitutional:       General: She is awake. She is not in acute distress.     Appearance: Normal appearance.   HENT:      Mouth/Throat:      Mouth: Mucous membranes are moist.      Pharynx: Oropharynx is clear.   Eyes:      General: No scleral icterus.     Extraocular Movements: Extraocular movements intact.      Conjunctiva/sclera: Conjunctivae normal.   Cardiovascular:      Rate and Rhythm: Normal rate.      Heart sounds: No murmur heard.     No gallop.   Pulmonary:      Breath sounds: Rales (minimal bibasilar) present. No wheezing.   Abdominal:      General: Bowel sounds are normal.      Palpations: Abdomen is soft.      Tenderness: There is no abdominal tenderness.   Musculoskeletal:         General: No  tenderness.      Right lower leg: No edema.      Left lower leg: No edema.   Neurological:      Mental Status: She is alert.      Comments: Alert, Awake, oriented to place and person. Generalized weakness   Psychiatric:         Behavior: Behavior is cooperative.          Result Review    Result Review:  I have personally reviewed the results from the time of this admission to 10/29/2023 13:02 EDT and agree with these findings:  [x]  Laboratory  [x]  Microbiology  [x]  Radiology  []  EKG/Telemetry   []  Cardiology/Vascular   []  Pathology  []  Old records  []  Other:  CMP:        Lab 10/29/23  0121 10/28/23  0625 10/27/23  0428 10/26/23  0310 10/25/23  0527 10/24/23  0307   SODIUM 142 142 139 141 138 143   POTASSIUM 4.1 4.5 3.9 3.6 3.8 4.0   CHLORIDE 102 108* 103 103 100 103   CO2 26.0 23.0 26.0 26.0 24.0 27.0   ANION GAP 14.0 11.0 10.0 12.0 14.0 13.0   BUN 73* 75* 101* 103* 108* 93*   CREATININE 1.15* 0.99 1.27* 1.38* 1.43* 1.67*   EGFR 47.7* 57.0* 42.3* 38.3* 36.7* 30.5*   GLUCOSE 176* 135* 174* 84 204* 244*   CALCIUM 9.3 9.0 8.5* 9.0 9.0 9.3   MAGNESIUM 2.6* 2.7* 2.5* 2.3 2.1 1.9   PHOSPHORUS  --   --   --   --   --  2.8   TOTAL PROTEIN 6.3 5.5* 5.2* 5.5* 5.4* 6.3   ALBUMIN 3.2* 3.0* 2.6* 2.8* 2.6* 3.1*   GLOBULIN 3.1 2.5 2.6 2.7 2.8 3.2   ALT (SGPT) 14 16 15 22 22 24   AST (SGOT) 22 18 16 22 22 30   BILIRUBIN 0.2 <0.2 <0.2 0.2 <0.2 0.2   ALK PHOS 137* 123* 109 118* 118* 127*    CBC:      Lab 10/29/23  0800 10/29/23  0324 10/28/23  1344 10/27/23  0428 10/26/23  0310 10/25/23  0527 10/24/23  0454   WBC 65.20* 60.50* 26.10* 19.40* 21.90* 18.30* 20.30*   HEMOGLOBIN 9.2* 8.7* 9.4* 7.6* 7.9* 7.7* 8.0*   HEMATOCRIT 30.8* 29.3* 29.9* 24.9* 25.7* 24.4* 25.9*   PLATELETS 568* 509* 316 250 230 172 203   NEUTROS ABS 11.74* 24.81* 11.22* 6.60 7.45* 8.97* 7.51*   EOS ABS  --   --  0.52* 0.97* 0.66* 0.18 0.20   MCV 88.1 88.1 87.0 88.9 87.2 86.6 87.7        Wounds (last 24 hours)               Assessment & Plan      Brief  Patient Summary:  Diane Guan is a 83 y.o. female with PMH of diabetes, hypertension, breast cancer presented to the hospital after a fall and was admitted with a principal diagnosis of Acute hypoxemic respiratory failure.  On admission, patient required 2 L nasal cannula and subsequently had worsening oxygenation with hypercapnic respiratory failure.  Failed BiPAP and subsequently was intubated on 10/15.  Initially treated with broad-spectrum antibiotics and ID was consulted.  RVP positive for rhinovirus, sputum cultures were negative.  Treated with diuretics for fluid overload.  Developed profound hypoxia and needed deep sedation.  Had bronch on 10/18.  Oxygenation improved with aggressive diuretics.  Sputum cultures grew Klebsiella and treated with meropenem.  Subsequently, extubated on 10/25 but developed hypoxia afterwards, failed BiPAP and had to be reintubated.  Also had LEXUS on admission, nephrology was consulted.  Renal ultrasound with no obstruction.  Treated with diuretics.  Subsequently, urine cultures grew both E. coli and Klebsiella pneumonia.  Treated with Zosyn for 7 days. During the hospital course, patient also developed elevated liver enzymes. Gallbladder ultrasound showed dilated common bile duct measuring 14 mm with distended gallbladder and sludge.  GI was consulted and recommended conservative management.  Liver enzymes subsequently normalized.  Subsequently, family decided on DNR/DNI prior to extubation on 10/27.  Did well with BiPAP.        [Held by provider] amLODIPine, 10 mg, Nasogastric, Q24H  chlorthalidone, 25 mg, Nasogastric, Daily  enoxaparin, 40 mg, Subcutaneous, Daily  [START ON 10/30/2023] Ferrous Sulfate, 300 mg, Nasogastric, Once per day on Mon Wed Fri  furosemide, 40 mg, Intravenous, Q8H  insulin lispro, 4-24 Units, Subcutaneous, Q6H  lansoprazole, 30 mg, Nasogastric, Q AM  levothyroxine, 100 mcg, Nasogastric, Q AM  meropenem, 1,000 mg, Intravenous, Q12H  rOPINIRole, 0.25  mg, Nasogastric, Nightly  rosuvastatin, 10 mg, Nasogastric, Nightly  senna-docusate sodium, 2 tablet, Nasogastric, BID  sertraline, 150 mg, Nasogastric, Daily  sodium chloride, 10 mL, Intravenous, Q12H  sodium chloride, 10 mL, Intravenous, Q12H             Active Hospital Problems:  Active Hospital Problems    Diagnosis     **Acute hypoxemic respiratory failure     Respiratory failure     Hyperkalemia     Acute respiratory failure     History of breast cancer     Personal history of CLL (chronic lymphocytic leukemia)     History of cigarette smoking     Fall at home     Rhinovirus infection     CKD (chronic kidney disease) stage 3, GFR 30-59 ml/min     COPD (chronic obstructive pulmonary disease)     LIBBY (obstructive sleep apnea)     Vitamin D deficiency     Vitamin B 12 deficiency     Overactive bladder     Primary osteoarthritis involving multiple joints     Mixed hyperlipidemia     Mixed anxiety and depressive disorder     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  Possible contribution from rhinovirus infection with resultant ARDS.  COPD: Acute on chronic.  Not on any home medications.  Bronchodilators as needed.  Failed extubation and had to be reintubated on 10/25.  Likely due to a combination of COPD, sleep apnea, deconditioning and ARDS.  Respiratory viral panel negative  -Respiratory cultures show heavy growth of Klebsiella sensitive to cefepime  Infectious is consulted.  Recommended to continue cefepime for 7 days  Extubated to BiPAP on 10/27, improving.  Wean off oxygen as tolerated.    scheduled AVAPS during the nighttime and as needed during the daytime.      Septic shock due to UTI / E. coli and Klebsiella bacteremia  RVP: +Rhinovirus  BAL cultures were negative.  Sputum cultures from 10/23 with Klebsiella.  ID following, currently on meropenem.  Shock state has resolved.     Acute on chronic heart failure with preserved EF:   Currently well compensated.  Last ECHO showed an EF of 70% with indeterminate LV diastolic function.   On intermittent diuretics, nephrology managing the diuretic regimen.  Monitor Input/Output very closely. Follow daily weights.   Net IO Since Admission: 6,948.95 mL [10/28/23 1112]     Transaminitis with dilated common bile duct  Gallbladder ultrasound with dilated CBD, measuring 14 mm with gall sludge.  GI following.  Liver enzymes normalized.     Acute toxic / metabolic encephalopathy  CT head on 10/14 with no acute pathology.  Likely due to residual effects of deep sedation for multiple days.  Some ICU delirium as well.  Mentation improved significantly.     Acute kidney injury on CKD stage III  Remains non oliguric.  Baseline creatinine of 1.3.  Likely ATN from septic shock.  Nephrology following, on intermittent diuretics.  Monitor Input/Output very closely.   Net IO Since Admission: 6,948.95 mL [10/28/23 1112]     Diabetes mellitus Type 2, not insulin-dependent : well controlled.   Hold home metformin.    Accu checks every 6 hours and c/w humalog coverage as needed.   Last A1c of 6.5.     Essential Hypertension: well controlled.   Resume home chlorthalidone.  Hold home Norvasc, Cozaar and irbesartan.  Restart medications as needed.     Iron deficiency anemia: On ferrous sulfate.  Some drop in hemoglobin with FOBT positive.  No gross bleeding, no need for emergent EGD/colonoscopy at this time.  Awaiting CBC from 10/28.     Primary hypothyroidism: Chronic and stable. Continue synthroid.    Dyslipidemia: Chronic and stable.  Continue with statins.    Breast cancer, s/p right mastectomy: Resume home anastrozole when able to swallow  pills.    Chronic lymphocytic leukemia>   -oncology n consulting. This was first diagnosed at the end of 2022 and she has not needed treatment since the diagnosis. Her condition is complicated at this time by the severity of the present illness. The sudden and marked increase in the white cell count most likely does not represent worsening of the malignant disease but rather is likely connected to the present illness. At this time, other than additional investigations and support I don't believe she needs intervention. One strong possibility is of hemolytic anemia and I will investigate. 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     Falls at home/functional decline-PT OT likely need nursing home discharge.    Restless leg syndrome: Continue with Requip.    Anxiety/depression: Continue Zoloft    Severe obstructive sleep apnea: API of 34.5.  Not sure if she uses any home CPAP.  Refused AVAPS in the past.      DVT prophylaxis:  Medical and mechanical DVT prophylaxis orders are present.    CODE STATUS:    Medical Intervention Limits: NO intubation (DNI)  Code Status (Patient has no pulse and is not breathing): No CPR (Do Not Attempt to Resuscitate)  Medical Interventions (Patient has pulse or is breathing): Limited Support  Comments: Spoke with daughter and  at the bedside on 10/27/2023      Disposition:  .  PT OT likely needs nursing home on discharge.      Electronically signed by Ra Myers MD, 10/29/23, 13:02 EDT.  [facility] Hospitalist Team

## 2023-10-29 NOTE — PROGRESS NOTES
Nutrition Services  Patient Name: Diane Guan  YOB: 1940  MRN: [id number]  Admission date: 10/10/2023    PPE Documentation        PPE Worn By Provider Did not enter room for this encounter   PPE Worn By Patient  N/A     PROGRESS NOTE      Encounter Information: Progress note to check on TF.  Noted patient  is without an NG tube at this time but per RN via Secure Chat, plan is to replace and continue with TF.         PO Diet: NPO Diet NPO Type: Strict NPO   PO Supplements: None ordered    PO Intake:  NPO       Current nutrition support: Peptamen AF at 65 mL/hour + 75 mL/hour water flush    Nutrition support review: Not running now related to no access        Labs (reviewed below): Reviewed, management per attending        GI Function:  Last documented BM 10/29 (today)  Residuals previously WNL       Nutrition Intervention Updates: Continue Peptamen AF at goal rate of 65 mL/hr. Continue FWF.        Results from last 7 days   Lab Units 10/29/23  0121 10/28/23  0625 10/27/23  0428   SODIUM mmol/L 142 142 139   POTASSIUM mmol/L 4.1 4.5 3.9   CHLORIDE mmol/L 102 108* 103   CO2 mmol/L 26.0 23.0 26.0   BUN mg/dL 73* 75* 101*   CREATININE mg/dL 1.15* 0.99 1.27*   CALCIUM mg/dL 9.3 9.0 8.5*   BILIRUBIN mg/dL 0.2 <0.2 <0.2   ALK PHOS U/L 137* 123* 109   ALT (SGPT) U/L 14 16 15   AST (SGOT) U/L 22 18 16   GLUCOSE mg/dL 176* 135* 174*     Results from last 7 days   Lab Units 10/29/23  0800 10/29/23  0324 10/29/23  0121 10/28/23  1344 10/28/23  0625 10/27/23  0428 10/24/23  0454 10/24/23  0307   MAGNESIUM mg/dL  --   --  2.6*  --  2.7* 2.5*   < > 1.9   PHOSPHORUS mg/dL  --   --   --   --   --   --   --  2.8   HEMOGLOBIN g/dL 9.2*   < >  --    < >  --  7.6*   < >  --    HEMATOCRIT % 30.8*   < >  --    < >  --  24.9*   < >  --    TRIGLYCERIDES mg/dL  --   --   --   --   --  407*   < >  --     < > = values in this interval not displayed.     COVID19   Date Value Ref Range Status   10/29/2023 Not Detected Not  Detected - Ref. Range Final     Lab Results   Component Value Date    HGBA1C 6.50 (H) 10/13/2023     RD to follow up per protocol.    Electronically signed by:  Vernell Gil RD  10/29/23 13:23 EDT

## 2023-10-29 NOTE — PROGRESS NOTES
"RENAL/K[facility]:     LOS: 19 days    Patient Care Team:  Asia Queen APRN as PCP - General (Nurse Practitioner)  Asia Queen APRN as Nurse Practitioner (Nurse Practitioner)  Ling Bolden MD as Consulting Physician (Hematology and Oncology)    Chief Complaint:  Acute resp failure    Subjective     Interval History:   Chart reviewed  Out of ICU  Emesis x 2 overnight  TF's on hold  Discussed with nurse and family    Objective     Vital Sign Min/Max for last 24 hours  Temp  Min: 97.9 °F (36.6 °C)  Max: 98.4 °F (36.9 °C)   BP  Min: 130/46  Max: 180/70   Pulse  Min: 62  Max: 86   Resp  Min: 18  Max: 24   SpO2  Min: 91 %  Max: 97 %   Flow (L/min)  Min: 15  Max: 15   Weight  Min: 78.9 kg (173 lb 15.1 oz)  Max: 78.9 kg (173 lb 15.1 oz)     Flowsheet Rows      Flowsheet Row First Filed Value   Admission Height 162.6 cm (64\") Documented at 10/10/2023 1252   Admission Weight 78.5 kg (173 lb) Documented at 10/10/2023 1252            No intake/output data recorded.  I/O last 3 completed shifts:  In: 1365.8 [P.O.:100; I.V.:1075.8; Other:125; NG/GT:65]  Out: 2025 [Urine:2025]    Physical Exam:  GEN: Sleeping  ENT: OP clear, +DHT  NECK: Supple, no JVD  CHEST: Lungs are clear  CV: RRR, no M/G/R, no peripheral edema  ABD: Soft, NT, +BS  SKIN: Warm and Dry  NEURO: CN's intact      WBC WBC   Date Value Ref Range Status   10/29/2023 60.50 (C) 3.40 - 10.80 10*3/mm3 Final   10/28/2023 26.10 (H) 3.40 - 10.80 10*3/mm3 Final   10/27/2023 19.40 (H) 3.40 - 10.80 10*3/mm3 Final      HGB Hemoglobin   Date Value Ref Range Status   10/29/2023 8.7 (L) 12.0 - 15.9 g/dL Final   10/28/2023 9.4 (L) 12.0 - 15.9 g/dL Final   10/27/2023 7.6 (L) 12.0 - 15.9 g/dL Final      HCT Hematocrit   Date Value Ref Range Status   10/29/2023 29.3 (L) 34.0 - 46.6 % Final   10/28/2023 29.9 (L) 34.0 - 46.6 % Final   10/27/2023 24.9 (L) 34.0 - 46.6 % Final      Platlets No results found for: \"LABPLAT\"   MCV MCV   Date Value Ref Range Status   10/29/2023 " "88.1 79.0 - 97.0 fL Final   10/28/2023 87.0 79.0 - 97.0 fL Final   10/27/2023 88.9 79.0 - 97.0 fL Final          Sodium Sodium   Date Value Ref Range Status   10/29/2023 142 136 - 145 mmol/L Final   10/28/2023 142 136 - 145 mmol/L Final   10/27/2023 139 136 - 145 mmol/L Final      Potassium Potassium   Date Value Ref Range Status   10/29/2023 4.1 3.5 - 5.2 mmol/L Final   10/28/2023 4.5 3.5 - 5.2 mmol/L Final     Comment:     Slight hemolysis detected by analyzer. Results may be affected.   10/27/2023 3.9 3.5 - 5.2 mmol/L Final      Chloride Chloride   Date Value Ref Range Status   10/29/2023 102 98 - 107 mmol/L Final   10/28/2023 108 (H) 98 - 107 mmol/L Final   10/27/2023 103 98 - 107 mmol/L Final      CO2 CO2   Date Value Ref Range Status   10/29/2023 26.0 22.0 - 29.0 mmol/L Final   10/28/2023 23.0 22.0 - 29.0 mmol/L Final   10/27/2023 26.0 22.0 - 29.0 mmol/L Final      BUN BUN   Date Value Ref Range Status   10/29/2023 73 (H) 8 - 23 mg/dL Final   10/28/2023 75 (H) 8 - 23 mg/dL Final   10/27/2023 101 (H) 8 - 23 mg/dL Final      Creatinine Creatinine   Date Value Ref Range Status   10/29/2023 1.15 (H) 0.57 - 1.00 mg/dL Final   10/28/2023 0.99 0.57 - 1.00 mg/dL Final   10/27/2023 1.27 (H) 0.57 - 1.00 mg/dL Final      Calcium Calcium   Date Value Ref Range Status   10/29/2023 9.3 8.6 - 10.5 mg/dL Final   10/28/2023 9.0 8.6 - 10.5 mg/dL Final   10/27/2023 8.5 (L) 8.6 - 10.5 mg/dL Final      PO4 No results found for: \"CAPO4\"   Albumin Albumin   Date Value Ref Range Status   10/29/2023 3.2 (L) 3.5 - 5.2 g/dL Final   10/28/2023 3.0 (L) 3.5 - 5.2 g/dL Final   10/27/2023 2.6 (L) 3.5 - 5.2 g/dL Final      Magnesium Magnesium   Date Value Ref Range Status   10/29/2023 2.6 (H) 1.6 - 2.4 mg/dL Final   10/28/2023 2.7 (H) 1.6 - 2.4 mg/dL Final   10/27/2023 2.5 (H) 1.6 - 2.4 mg/dL Final      Uric Acid No results found for: \"URICACID\"        Results Review:     I reviewed the patient's new clinical results.    ALPRAZolam, 0.5 mg, " Nasogastric, TID  [Held by provider] amLODIPine, 10 mg, Nasogastric, Q24H  chlorthalidone, 25 mg, Nasogastric, Daily  enoxaparin, 40 mg, Subcutaneous, Daily  ferrous sulfate, 300 mg, Nasogastric, Once per day on Mon Wed Fri  furosemide, 40 mg, Intravenous, Q8H  insulin lispro, 4-24 Units, Subcutaneous, Q6H  lansoprazole, 30 mg, Nasogastric, Q AM  levothyroxine, 100 mcg, Nasogastric, Q AM  meropenem, 1,000 mg, Intravenous, Q12H  rOPINIRole, 0.25 mg, Nasogastric, Nightly  rosuvastatin, 10 mg, Nasogastric, Nightly  senna-docusate sodium, 2 tablet, Nasogastric, BID  sertraline, 150 mg, Nasogastric, Daily  sodium chloride, 10 mL, Intravenous, Q12H  sodium chloride, 10 mL, Intravenous, Q12H             Medication Review: Reviewed    Assessment & Plan     LEXUS/CKD3  Sepsis  PNA  UTI    Acute hypoxemic respiratory failure    Mixed anxiety and depressive disorder    Primary osteoarthritis involving multiple joints    Hiatal hernia with gastroesophageal reflux    Mixed hyperlipidemia    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    Acquired hypothyroidism    Type 2 diabetes mellitus with stage 3b chronic kidney disease, without long-term current use of insulin    Insomnia    Overactive bladder    Vitamin B 12 deficiency    Vitamin D deficiency    LIBBY (obstructive sleep apnea)    COPD (chronic obstructive pulmonary disease)    CKD (chronic kidney disease) stage 3, GFR 30-59 ml/min    History of breast cancer    Personal history of CLL (chronic lymphocytic leukemia)    History of cigarette smoking    Fall at home    Rhinovirus infection    Acute respiratory failure    Hyperkalemia    Respiratory failure    Pleural effusion    Plan: Cr stable at 1.1.  Lasix 40 mg IV x 2 today.  Patient is now DNR/DNI.  WBC 60 - has PNA, UTI and underlying CLL.  ID following for antibiotics.  Guarded prognosis.  Discussed with family.      Frederick Bonds MD  [facility]  10/29/23  05:51 EDT

## 2023-10-29 NOTE — PROGRESS NOTES
Infectious Diseases Progress Note      LOS: 19 days   Patient Care Team:  Asia Queen APRN as PCP - General (Nurse Practitioner)  Asia Queen APRN as Nurse Practitioner (Nurse Practitioner)  Ling Bolden MD as Consulting Physician (Hematology and Oncology)    Chief Complaint: Intubated on the ventilator    Subjective     The patient had no high-grade fever during the last 24 hours.  Patient is currently on 15 L cannula via nasal cannula and is alert and awake.  The patient remained hemodynamically stable    Review of Systems:   Review of Systems   Unable to perform ROS: Acuity of condition   Constitutional:  Positive for fatigue.   Respiratory:  Positive for cough and shortness of breath.         Objective     Vital Signs  Temp:  [97.9 °F (36.6 °C)-98.8 °F (37.1 °C)] 98.8 °F (37.1 °C)  Heart Rate:  [78-86] 78  Resp:  [19-24] 21  BP: (130-180)/(46-74) 180/72    Physical Exam:  Physical Exam  Vitals and nursing note reviewed.   Constitutional:       Appearance: She is ill-appearing.   HENT:      Head: Normocephalic and atraumatic.   Eyes:      Conjunctiva/sclera: Conjunctivae normal.      Pupils: Pupils are equal, round, and reactive to light.   Cardiovascular:      Rate and Rhythm: Normal rate and regular rhythm.   Pulmonary:      Effort: Respiratory distress present.      Breath sounds: Rales present.   Abdominal:      General: Abdomen is flat. Bowel sounds are normal.      Palpations: Abdomen is soft.      Comments: NG tube   Genitourinary:     Comments: External catheter  Musculoskeletal:      Cervical back: Neck supple.      Right lower leg: No edema.      Left lower leg: No edema.   Neurological:      Mental Status: She is alert.      Comments: Alert and awake          Results Review:    I have reviewed all clinical data, test, lab, and imaging results.     Radiology  XR Chest 1 View    Result Date: 10/29/2023  XR CHEST 1 VW Date of Exam: 10/29/2023 12:25 AM EDT Indication: Hypoxia  Comparison: 10/28/2023 at 0138 hours. Findings: There is persistent right basilar atelectasis medially. There is persistent left basilar airspace disease. No definite pleural effusion. No pneumothorax. Heart size is unchanged. Pulmonary vasculature is unremarkable. No acute osseous abnormalities.     Impression: Persistent bibasilar airspace disease and medial right basilar atelectasis. Electronically Signed: Miguel Trent MD  10/29/2023 1:01 AM EDT  Workstation ID: [id number]     Cardiology    Laboratory    Results from last 7 days   Lab Units 10/29/23  0800 10/29/23  0324 10/28/23  1344 10/27/23  0428 10/26/23  0310 10/25/23  0527 10/24/23  0454   WBC 10*3/mm3 65.20* 60.50* 26.10* 19.40* 21.90* 18.30* 20.30*   HEMOGLOBIN g/dL 9.2* 8.7* 9.4* 7.6* 7.9* 7.7* 8.0*   HEMATOCRIT % 30.8* 29.3* 29.9* 24.9* 25.7* 24.4* 25.9*   PLATELETS 10*3/mm3 568* 509* 316 250 230 172 203     Results from last 7 days   Lab Units 10/29/23  0121 10/28/23  0625 10/27/23  0428 10/26/23  0310 10/25/23  0527 10/24/23  0307 10/23/23  0421   SODIUM mmol/L 142 142 139 141 138 143 147*   POTASSIUM mmol/L 4.1 4.5 3.9 3.6 3.8 4.0 3.9   CHLORIDE mmol/L 102 108* 103 103 100 103 105   CO2 mmol/L 26.0 23.0 26.0 26.0 24.0 27.0 27.0   BUN mg/dL 73* 75* 101* 103* 108* 93* 88*   CREATININE mg/dL 1.15* 0.99 1.27* 1.38* 1.43* 1.67* 1.71*   GLUCOSE mg/dL 176* 135* 174* 84 204* 244* 219*   ALBUMIN g/dL 3.2* 3.0* 2.6* 2.8* 2.6* 3.1* 3.4*   BILIRUBIN mg/dL 0.2 <0.2 <0.2 0.2 <0.2 0.2 0.2   ALK PHOS U/L 137* 123* 109 118* 118* 127* 163*   AST (SGOT) U/L 22 18 16 22 22 30 20   ALT (SGPT) U/L 14 16 15 22 22 24 22   CALCIUM mg/dL 9.3 9.0 8.5* 9.0 9.0 9.3 9.9                 Microbiology   Microbiology Results (last 10 days)       Procedure Component Value - Date/Time    Respiratory Panel PCR w/COVID-19(SARS-CoV-2) LESLEE/VIVIANE/PAVAN/PAD/COR/MAD/JUAN DAVID In-House, NP Swab in UTM/VTM, 3-4 HR TAT - Swab, Nasopharynx [[id number]]  (Normal) Collected: 10/29/23 1113    Lab Status: Final  result Specimen: Swab from Nasopharynx Updated: 10/29/23 1218     ADENOVIRUS, PCR Not Detected     Coronavirus 229E Not Detected     Coronavirus HKU1 Not Detected     Coronavirus NL63 Not Detected     Coronavirus OC43 Not Detected     COVID19 Not Detected     Human Metapneumovirus Not Detected     Human Rhinovirus/Enterovirus Not Detected     Influenza A PCR Not Detected     Influenza B PCR Not Detected     Parainfluenza Virus 1 Not Detected     Parainfluenza Virus 2 Not Detected     Parainfluenza Virus 3 Not Detected     Parainfluenza Virus 4 Not Detected     RSV, PCR Not Detected     Bordetella pertussis pcr Not Detected     Bordetella parapertussis PCR Not Detected     Chlamydophila pneumoniae PCR Not Detected     Mycoplasma pneumo by PCR Not Detected    Narrative:      In the setting of a positive respiratory panel with a viral infection PLUS a negative procalcitonin without other underlying concern for bacterial infection, consider observing off antibiotics or discontinuation of antibiotics and continue supportive care. If the respiratory panel is positive for atypical bacterial infection (Bordetella pertussis, Chlamydophila pneumoniae, or Mycoplasma pneumoniae), consider antibiotic de-escalation to target atypical bacterial infection.    Blood Culture - Blood, Hand, Left [[id number]]  (Normal) Collected: 10/23/23 1612    Lab Status: Final result Specimen: Blood from Hand, Left Updated: 10/28/23 1631     Blood Culture No growth at 5 days    Blood Culture - Blood, Arm, Left [[id number]]  (Normal) Collected: 10/23/23 1611    Lab Status: Final result Specimen: Blood from Arm, Left Updated: 10/28/23 1631     Blood Culture No growth at 5 days    Respiratory Culture - Aspirate, ET Suction [[id number]]  (Abnormal)  (Susceptibility) Collected: 10/23/23 1356    Lab Status: Final result Specimen: Aspirate from ET Suction Updated: 10/25/23 0758     Respiratory Culture Heavy growth (4+) Klebsiella aerogenes      No Normal  Respiratory Leanna     Gram Stain Less than 10 Epithelial cells per low power field      Moderate (3+) WBCs seen      Many (4+) Gram negative bacilli      Occasional Gram positive cocci in clusters      Rare (1+) Yeast    Susceptibility        Klebsiella aerogenes      JUAN      Cefepime Susceptible      Ceftazidime Resistant      Ceftriaxone Resistant      Gentamicin Susceptible      Levofloxacin Susceptible      Piperacillin + Tazobactam Resistant      Tetracycline Susceptible      Trimethoprim + Sulfamethoxazole Susceptible                       Susceptibility Comments       Klebsiella aerogenes    Cefazolin sensitivity will not be reported for Enterobacteriaceae in non-urine isolates. If cefazolin is preferred, please call the microbiology lab to request an E-test.  With the exception of urinary-sourced infections, aminoglycosides should not be used as monotherapy.                       Medication Review:       Schedule Meds  [Held by provider] amLODIPine, 10 mg, Nasogastric, Q24H  chlorthalidone, 25 mg, Nasogastric, Daily  enoxaparin, 40 mg, Subcutaneous, Daily  [START ON 10/30/2023] Ferrous Sulfate, 300 mg, Nasogastric, Once per day on Mon Wed Fri  furosemide, 40 mg, Intravenous, Q8H  insulin lispro, 4-24 Units, Subcutaneous, Q6H  lansoprazole, 30 mg, Nasogastric, Q AM  levothyroxine, 100 mcg, Nasogastric, Q AM  meropenem, 1,000 mg, Intravenous, Q12H  rOPINIRole, 0.25 mg, Nasogastric, Nightly  rosuvastatin, 10 mg, Nasogastric, Nightly  senna-docusate sodium, 2 tablet, Nasogastric, BID  sertraline, 150 mg, Nasogastric, Daily  sodium chloride, 10 mL, Intravenous, Q12H  sodium chloride, 10 mL, Intravenous, Q12H        Infusion Meds         PRN Meds    acetaminophen    cyclobenzaprine    dextrose    dextrose    dextrose    glucagon (human recombinant)    hydrALAZINE    HYDROmorphone    ipratropium-albuterol    lidocaine    LORazepam    nitroglycerin    ondansetron    oxyCODONE    polyethylene glycol     prochlorperazine    [COMPLETED] Insert Peripheral IV **AND** sodium chloride    sodium chloride    sodium chloride    sodium chloride    sodium chloride        Assessment & Plan       Antimicrobial Therapy   1.  IV meropenem       2.        3.        4.        5.            Assessment     Acute respiratory failure.  Most likely secondary to hospital-acquired pneumonia.  CT scan of the chest on October 18 showed bilateral lower lobe infiltrates.  There was no additional findings consistent with PCP pneumonia  Patient is s/p bronchoscopy and BAL cultures are negative  MRSA screen was negative  -Patient is now extubated and on 15 L of oxygen by nasal cannula     Severe lymphocytosis.  Most likely secondary to underlying CLL.  Patient was not on treatment prior to admission.  Improving     History of breast cancer s/p mastectomies in the past     UTI with Klebsiella pneumoniae and E. coli.  Organisms were relatively susceptible     Diabetes mellitus type 2     Rhinovirus infection.  Nasal swab was positive for rhinovirus PCR    Mild septic shock-currently off vasopressors    Possible ventilator associated pneumonia.  Tracheal aspirate culture from October 23, 2023 grew Klebsiella aerogenes.  Based on the susceptibility I am suspecting AmpC  pathogen     Plan     Continue meropenem 1 g IV every 12 hours for 7 days  Continue supportive care  A.m. labs  Case discussed with patient's family at bedside      Damian Santos MD  10/29/23  12:49 EDT    Note is dictated utilizing voice recognition software/Dragon

## 2023-10-29 NOTE — PLAN OF CARE
Goal Outcome Evaluation:   SLP following for dysphagia re-evaluation. D/w RN, pt not appropriate for swallow evaluation at this time. She now has NG tube for suction, being w/u for SBO. Recommend continue NPO until further eval competed; will f/u as clinical condition improves.

## 2023-10-30 ENCOUNTER — INPATIENT HOSPITAL (AMBULATORY)
Dept: URBAN - METROPOLITAN AREA HOSPITAL 84 | Facility: HOSPITAL | Age: 83
End: 2023-10-30
Payer: MEDICARE

## 2023-10-30 ENCOUNTER — APPOINTMENT (OUTPATIENT)
Dept: GENERAL RADIOLOGY | Facility: HOSPITAL | Age: 83
DRG: 207 | End: 2023-10-30
Payer: MEDICARE

## 2023-10-30 DIAGNOSIS — D62 ACUTE POSTHEMORRHAGIC ANEMIA: ICD-10-CM

## 2023-10-30 DIAGNOSIS — R74.01 ELEVATION OF LEVELS OF LIVER TRANSAMINASE LEVELS: ICD-10-CM

## 2023-10-30 DIAGNOSIS — E78.1 PURE HYPERGLYCERIDEMIA: ICD-10-CM

## 2023-10-30 DIAGNOSIS — D72.820 LYMPHOCYTOSIS (SYMPTOMATIC): ICD-10-CM

## 2023-10-30 DIAGNOSIS — K92.0 HEMATEMESIS: ICD-10-CM

## 2023-10-30 DIAGNOSIS — E11.69 TYPE 2 DIABETES MELLITUS WITH OTHER SPECIFIED COMPLICATION: ICD-10-CM

## 2023-10-30 DIAGNOSIS — Z85.3 PERSONAL HISTORY OF MALIGNANT NEOPLASM OF BREAST: ICD-10-CM

## 2023-10-30 LAB
ALBUMIN SERPL-MCNC: 3.5 G/DL (ref 3.5–5.2)
ALBUMIN/GLOB SERPL: 1 G/DL
ALP SERPL-CCNC: 148 U/L (ref 39–117)
ALT SERPL W P-5'-P-CCNC: 16 U/L (ref 1–33)
ANION GAP SERPL CALCULATED.3IONS-SCNC: 17 MMOL/L (ref 5–15)
ANISOCYTOSIS BLD QL: ABNORMAL
AST SERPL-CCNC: 20 U/L (ref 1–32)
BILIRUB SERPL-MCNC: 0.2 MG/DL (ref 0–1.2)
BUN SERPL-MCNC: 69 MG/DL (ref 8–23)
BUN/CREAT SERPL: 58 (ref 7–25)
CALCIUM SPEC-SCNC: 9.6 MG/DL (ref 8.6–10.5)
CHLORIDE SERPL-SCNC: 103 MMOL/L (ref 98–107)
CO2 SERPL-SCNC: 28 MMOL/L (ref 22–29)
CREAT SERPL-MCNC: 1.19 MG/DL (ref 0.57–1)
DEPRECATED RDW RBC AUTO: 48.6 FL (ref 37–54)
EGFRCR SERPLBLD CKD-EPI 2021: 45.7 ML/MIN/1.73
EOSINOPHIL # BLD MANUAL: 1.19 10*3/MM3 (ref 0–0.4)
EOSINOPHIL NFR BLD MANUAL: 2 % (ref 0.3–6.2)
ERYTHROCYTE [DISTWIDTH] IN BLOOD BY AUTOMATED COUNT: 15.6 % (ref 12.3–15.4)
GLOBULIN UR ELPH-MCNC: 3.4 GM/DL
GLUCOSE BLDC GLUCOMTR-MCNC: 135 MG/DL (ref 70–105)
GLUCOSE BLDC GLUCOMTR-MCNC: 149 MG/DL (ref 70–105)
GLUCOSE BLDC GLUCOMTR-MCNC: 180 MG/DL (ref 70–105)
GLUCOSE SERPL-MCNC: 154 MG/DL (ref 65–99)
HCT VFR BLD AUTO: 29.4 % (ref 34–46.6)
HGB BLD-MCNC: 9.1 G/DL (ref 12–15.9)
LYMPHOCYTES # BLD MANUAL: 46.93 10*3/MM3 (ref 0.7–3.1)
LYMPHOCYTES NFR BLD MANUAL: 1 % (ref 5–12)
MAGNESIUM SERPL-MCNC: 2.5 MG/DL (ref 1.6–2.4)
MCH RBC QN AUTO: 26.5 PG (ref 26.6–33)
MCHC RBC AUTO-ENTMCNC: 30.8 G/DL (ref 31.5–35.7)
MCV RBC AUTO: 86 FL (ref 79–97)
MONOCYTES # BLD: 0.59 10*3/MM3 (ref 0.1–0.9)
NEUTROPHILS # BLD AUTO: 10.69 10*3/MM3 (ref 1.7–7)
NEUTROPHILS NFR BLD MANUAL: 18 % (ref 42.7–76)
PLATELET # BLD AUTO: 546 10*3/MM3 (ref 140–450)
PMV BLD AUTO: 9.7 FL (ref 6–12)
POIKILOCYTOSIS BLD QL SMEAR: ABNORMAL
POTASSIUM SERPL-SCNC: 4 MMOL/L (ref 3.5–5.2)
PROT SERPL-MCNC: 6.9 G/DL (ref 6–8.5)
RBC # BLD AUTO: 3.42 10*6/MM3 (ref 3.77–5.28)
SCAN SLIDE: NORMAL
SMALL PLATELETS BLD QL SMEAR: ABNORMAL
SMUDGE CELLS BLD QL SMEAR: ABNORMAL
SODIUM SERPL-SCNC: 148 MMOL/L (ref 136–145)
VARIANT LYMPHS NFR BLD MANUAL: 79 % (ref 19.6–45.3)
WBC NRBC COR # BLD: 59.4 10*3/MM3 (ref 3.4–10.8)

## 2023-10-30 PROCEDURE — 99232 SBSQ HOSP IP/OBS MODERATE 35: CPT | Performed by: NURSE PRACTITIONER

## 2023-10-30 PROCEDURE — 71045 X-RAY EXAM CHEST 1 VIEW: CPT

## 2023-10-30 PROCEDURE — 99232 SBSQ HOSP IP/OBS MODERATE 35: CPT | Performed by: INTERNAL MEDICINE

## 2023-10-30 PROCEDURE — 83735 ASSAY OF MAGNESIUM: CPT | Performed by: STUDENT IN AN ORGANIZED HEALTH CARE EDUCATION/TRAINING PROGRAM

## 2023-10-30 PROCEDURE — 94799 UNLISTED PULMONARY SVC/PX: CPT

## 2023-10-30 PROCEDURE — 25010000002 LORAZEPAM PER 2 MG: Performed by: INTERNAL MEDICINE

## 2023-10-30 PROCEDURE — 94660 CPAP INITIATION&MGMT: CPT

## 2023-10-30 PROCEDURE — 74018 RADEX ABDOMEN 1 VIEW: CPT

## 2023-10-30 PROCEDURE — 80053 COMPREHEN METABOLIC PANEL: CPT | Performed by: STUDENT IN AN ORGANIZED HEALTH CARE EDUCATION/TRAINING PROGRAM

## 2023-10-30 PROCEDURE — 25010000002 MEROPENEM PER 100 MG: Performed by: INTERNAL MEDICINE

## 2023-10-30 PROCEDURE — 63710000001 INSULIN LISPRO (HUMAN) PER 5 UNITS: Performed by: INTERNAL MEDICINE

## 2023-10-30 PROCEDURE — 82948 REAGENT STRIP/BLOOD GLUCOSE: CPT

## 2023-10-30 PROCEDURE — 25010000002 HYDROMORPHONE 1 MG/ML SOLUTION: Performed by: NURSE PRACTITIONER

## 2023-10-30 RX ORDER — METOCLOPRAMIDE 5 MG/1
5 TABLET ORAL EVERY 8 HOURS
Status: DISCONTINUED | OUTPATIENT
Start: 2023-10-30 | End: 2023-10-30

## 2023-10-30 RX ORDER — METOCLOPRAMIDE 5 MG/1
5 TABLET ORAL EVERY 8 HOURS
Status: DISCONTINUED | OUTPATIENT
Start: 2023-10-30 | End: 2023-11-06 | Stop reason: HOSPADM

## 2023-10-30 RX ORDER — LORAZEPAM 2 MG/ML
0.5 INJECTION INTRAMUSCULAR EVERY 6 HOURS PRN
Status: DISCONTINUED | OUTPATIENT
Start: 2023-10-30 | End: 2023-11-06 | Stop reason: HOSPADM

## 2023-10-30 RX ORDER — SUCRALFATE 1 G/1
1 TABLET ORAL
Status: DISCONTINUED | OUTPATIENT
Start: 2023-10-30 | End: 2023-10-30

## 2023-10-30 RX ORDER — SUCRALFATE 1 G/1
1 TABLET ORAL
Status: DISCONTINUED | OUTPATIENT
Start: 2023-10-30 | End: 2023-11-06 | Stop reason: HOSPADM

## 2023-10-30 RX ADMIN — SENNOSIDES AND DOCUSATE SODIUM 2 TABLET: 8.6; 5 TABLET ORAL at 20:09

## 2023-10-30 RX ADMIN — PANTOPRAZOLE SODIUM 40 MG: 40 INJECTION, POWDER, LYOPHILIZED, FOR SOLUTION INTRAVENOUS at 20:09

## 2023-10-30 RX ADMIN — MEROPENEM 1000 MG: 1 INJECTION, POWDER, FOR SOLUTION INTRAVENOUS at 05:38

## 2023-10-30 RX ADMIN — SUCRALFATE 1 G: 1 TABLET ORAL at 20:09

## 2023-10-30 RX ADMIN — HYDROMORPHONE HYDROCHLORIDE 0.5 MG: 1 INJECTION, SOLUTION INTRAMUSCULAR; INTRAVENOUS; SUBCUTANEOUS at 14:02

## 2023-10-30 RX ADMIN — IPRATROPIUM BROMIDE AND ALBUTEROL SULFATE 3 ML: .5; 3 SOLUTION RESPIRATORY (INHALATION) at 12:41

## 2023-10-30 RX ADMIN — LORAZEPAM 0.5 MG: 2 INJECTION INTRAMUSCULAR; INTRAVENOUS at 08:02

## 2023-10-30 RX ADMIN — ROPINIROLE HYDROCHLORIDE 0.25 MG: 1 TABLET, FILM COATED ORAL at 20:09

## 2023-10-30 RX ADMIN — IPRATROPIUM BROMIDE AND ALBUTEROL SULFATE 3 ML: .5; 3 SOLUTION RESPIRATORY (INHALATION) at 23:30

## 2023-10-30 RX ADMIN — Medication 10 ML: at 20:10

## 2023-10-30 RX ADMIN — INSULIN LISPRO 4 UNITS: 100 INJECTION, SOLUTION INTRAVENOUS; SUBCUTANEOUS at 18:24

## 2023-10-30 RX ADMIN — SUCRALFATE 1 G: 1 TABLET ORAL at 12:16

## 2023-10-30 RX ADMIN — OXYCODONE HYDROCHLORIDE 5 MG: 5 TABLET ORAL at 17:59

## 2023-10-30 RX ADMIN — Medication 10 ML: at 10:11

## 2023-10-30 RX ADMIN — ROSUVASTATIN 10 MG: 10 TABLET, FILM COATED ORAL at 20:09

## 2023-10-30 RX ADMIN — PANTOPRAZOLE SODIUM 40 MG: 40 INJECTION, POWDER, LYOPHILIZED, FOR SOLUTION INTRAVENOUS at 08:04

## 2023-10-30 RX ADMIN — CHLORTHALIDONE 25 MG: 25 TABLET ORAL at 10:09

## 2023-10-30 RX ADMIN — MEROPENEM 1000 MG: 1 INJECTION, POWDER, FOR SOLUTION INTRAVENOUS at 17:59

## 2023-10-30 RX ADMIN — POLYETHYLENE GLYCOL 3350 17 G: 17 POWDER, FOR SOLUTION ORAL at 23:13

## 2023-10-30 RX ADMIN — SUCRALFATE 1 G: 1 TABLET ORAL at 17:59

## 2023-10-30 RX ADMIN — HYDROMORPHONE HYDROCHLORIDE 0.5 MG: 1 INJECTION, SOLUTION INTRAMUSCULAR; INTRAVENOUS; SUBCUTANEOUS at 20:12

## 2023-10-30 RX ADMIN — METOCLOPRAMIDE 5 MG: 5 TABLET ORAL at 10:08

## 2023-10-30 RX ADMIN — Medication 10 ML: at 10:10

## 2023-10-30 RX ADMIN — SERTRALINE 150 MG: 100 TABLET, FILM COATED ORAL at 10:08

## 2023-10-30 RX ADMIN — Medication 300 MG: at 10:08

## 2023-10-30 RX ADMIN — METOCLOPRAMIDE 5 MG: 5 TABLET ORAL at 17:59

## 2023-10-30 NOTE — CASE MANAGEMENT/SOCIAL WORK
Continued Stay Note   [facility]     Patient Name: Diane Guan  MRN: [id number]  Today's Date: 10/30/2023    Admit Date: 10/10/2023    Plan: [facility] referral pending acceptance. Will require CATH[employer] approved. From home with spouse and no home O2.   Discharge Plan       Row Name 10/30/23 1049       Plan    Plan [facility] referral pending acceptance. Will require CATH[employer] approved. From home with spouse and no home O2.    Plan Comments DC Barriers: NG tube in place with suction, GI following, O2 requirements (currently on 10L).                  Daphnie Romero RN     Office Phone: [phone]  Office Cell: [phone]

## 2023-10-30 NOTE — PROGRESS NOTES
Nutrition Services  Patient Name: Diane Guan  YOB: 1940  MRN: [id number]  Admission date: 10/10/2023    PPE Documentation        PPE Worn By Provider Did not enter room for this encounter   PPE Worn By Patient  N/A     PROGRESS NOTE      Encounter Information: Progress note to check on TF.  NG tube was replaced yesterday 10/29 and Peptamen AF is infusing at 65mL/hour per documentation. Pt has been able to move to PCU environment (out of critical care). Continues to have significant dyspnea and on 10L O2 support.       PO Diet: NPO Diet NPO Type: Strict NPO   PO Supplements: None ordered    PO Intake:  NPO       Current nutrition support: Peptamen AF at 65 mL/hour + 75 mL/hour water flush    Nutrition support review: Not running now related to no access        Labs (reviewed below): Mild hypernatremia - likely from temporary hold yesterday due to lack of access, and subsequent reduction in fluid intake; suspect this will improve now that infusion/flushes have resumed; diuretics are on hold presently        GI Function:  Last documented BM 10/30 (today)  Residuals previously WNL       Nutrition Intervention Updates: Continue current EN regimen, as ordered.       Results from last 7 days   Lab Units 10/30/23  1212 10/29/23  0121 10/28/23  0625   SODIUM mmol/L 148* 142 142   POTASSIUM mmol/L 4.0 4.1 4.5   CHLORIDE mmol/L 103 102 108*   CO2 mmol/L 28.0 26.0 23.0   BUN mg/dL 69* 73* 75*   CREATININE mg/dL 1.19* 1.15* 0.99   CALCIUM mg/dL 9.6 9.3 9.0   BILIRUBIN mg/dL 0.2 0.2 <0.2   ALK PHOS U/L 148* 137* 123*   ALT (SGPT) U/L 16 14 16   AST (SGOT) U/L 20 22 18   GLUCOSE mg/dL 154* 176* 135*     Results from last 7 days   Lab Units 10/30/23  1212 10/29/23  2331 10/29/23  0324 10/29/23  0121 10/28/23  1344 10/28/23  0625 10/27/23  0428 10/24/23  0454 10/24/23  0307   MAGNESIUM mg/dL 2.5*  --   --  2.6*  --  2.7* 2.5*   < > 1.9   PHOSPHORUS mg/dL  --   --   --   --   --   --   --   --  2.8   HEMOGLOBIN  g/dL  --  9.1*   < >  --    < >  --  7.6*   < >  --    HEMATOCRIT %  --  29.4*   < >  --    < >  --  24.9*   < >  --    TRIGLYCERIDES mg/dL  --   --   --   --   --   --  407*   < >  --     < > = values in this interval not displayed.     COVID19   Date Value Ref Range Status   10/29/2023 Not Detected Not Detected - Ref. Range Final     Lab Results   Component Value Date    HGBA1C 6.50 (H) 10/13/2023     RD to follow up per protocol.    Electronically signed by:  Kita Chino RD  10/30/23 16:58 EDT

## 2023-10-30 NOTE — PROGRESS NOTES
Infectious Diseases Progress Note      LOS: 20 days   Patient Care Team:  Asia Queen APRN as PCP - General (Nurse Practitioner)  Asia Queen APRN as Nurse Practitioner (Nurse Practitioner)  Ling Bolden MD as Consulting Physician (Hematology and Oncology)    Chief Complaint: Intubated on the ventilator    Subjective     The patient had no high-grade fever during the last 24 hours.  Patient is currently on 10 L cannula via nasal cannula and is alert and awake.  The patient remained hemodynamically stable    Review of Systems:   Review of Systems   Unable to perform ROS: Acuity of condition   Constitutional:  Positive for fatigue.   Respiratory:  Positive for cough and shortness of breath.         Objective     Vital Signs  Temp:  [97.5 °F (36.4 °C)-98.4 °F (36.9 °C)] 98.3 °F (36.8 °C)  Heart Rate:  [78-91] 87  Resp:  [17-22] 22  BP: (142-187)/(54-72) 142/61    Physical Exam:  Physical Exam  Vitals and nursing note reviewed.   Constitutional:       Appearance: She is ill-appearing.   HENT:      Head: Normocephalic and atraumatic.   Eyes:      Conjunctiva/sclera: Conjunctivae normal.      Pupils: Pupils are equal, round, and reactive to light.   Cardiovascular:      Rate and Rhythm: Normal rate and regular rhythm.   Pulmonary:      Effort: Respiratory distress present.      Breath sounds: Rales present.   Abdominal:      General: Abdomen is flat. Bowel sounds are normal.      Palpations: Abdomen is soft.      Comments: NG tube   Genitourinary:     Comments: External catheter  Musculoskeletal:      Cervical back: Neck supple.      Right lower leg: No edema.      Left lower leg: No edema.   Neurological:      Mental Status: She is alert.      Comments: Alert and awake          Results Review:    I have reviewed all clinical data, test, lab, and imaging results.     Radiology  XR Chest 1 View    Result Date: 10/30/2023  XR CHEST 1 VW Date of Exam: 10/30/2023 5:15 AM EDT Indication: Hypoxia  Comparison: 10/29/2023 Findings: Interval placement of an esophagogastric tube with tip below the diaphragm. Mild bibasilar airspace disease similar to prior study likely atelectasis. Stable cardiomegaly. No pneumothorax. Small bilateral pleural effusions unchanged. Osseous structures grossly intact.     Impression: 1. Placement of esophagogastric tube with tip below the diaphragm. 2. Mild bibasilar airspace disease likely atelectasis with small bilateral pleural effusions, unchanged. Electronically Signed: Pavan Amezquita MD  10/30/2023 7:08 AM EDT  Workstation ID: [id number]     Cardiology    Laboratory    Results from last 7 days   Lab Units 10/29/23  2331 10/29/23  0800 10/29/23  0324 10/28/23  1344 10/27/23  0428 10/26/23  0310 10/25/23  0527   WBC 10*3/mm3 59.40* 65.20* 60.50* 26.10* 19.40* 21.90* 18.30*   HEMOGLOBIN g/dL 9.1* 9.2* 8.7* 9.4* 7.6* 7.9* 7.7*   HEMATOCRIT % 29.4* 30.8* 29.3* 29.9* 24.9* 25.7* 24.4*   PLATELETS 10*3/mm3 546* 568* 509* 316 250 230 172     Results from last 7 days   Lab Units 10/30/23  1212 10/29/23  0121 10/28/23  0625 10/27/23  0428 10/26/23  0310 10/25/23  0527 10/24/23  0307   SODIUM mmol/L 148* 142 142 139 141 138 143   POTASSIUM mmol/L 4.0 4.1 4.5 3.9 3.6 3.8 4.0   CHLORIDE mmol/L 103 102 108* 103 103 100 103   CO2 mmol/L 28.0 26.0 23.0 26.0 26.0 24.0 27.0   BUN mg/dL 69* 73* 75* 101* 103* 108* 93*   CREATININE mg/dL 1.19* 1.15* 0.99 1.27* 1.38* 1.43* 1.67*   GLUCOSE mg/dL 154* 176* 135* 174* 84 204* 244*   ALBUMIN g/dL 3.5 3.2* 3.0* 2.6* 2.8* 2.6* 3.1*   BILIRUBIN mg/dL 0.2 0.2 <0.2 <0.2 0.2 <0.2 0.2   ALK PHOS U/L 148* 137* 123* 109 118* 118* 127*   AST (SGOT) U/L 20 22 18 16 22 22 30   ALT (SGPT) U/L 16 14 16 15 22 22 24   CALCIUM mg/dL 9.6 9.3 9.0 8.5* 9.0 9.0 9.3                 Microbiology   Microbiology Results (last 10 days)       Procedure Component Value - Date/Time    Respiratory Panel PCR w/COVID-19(SARS-CoV-2) LESLEE/VIVIANE/PAVAN/PAD/COR/MAD/JUAN DAVID In-House, NP Swab in [facility],  3-4 HR TAT - Swab, Nasopharynx [[id number]]  (Normal) Collected: 10/29/23 1113    Lab Status: Final result Specimen: Swab from Nasopharynx Updated: 10/29/23 1218     ADENOVIRUS, PCR Not Detected     Coronavirus 229E Not Detected     Coronavirus HKU1 Not Detected     Coronavirus NL63 Not Detected     Coronavirus OC43 Not Detected     COVID19 Not Detected     Human Metapneumovirus Not Detected     Human Rhinovirus/Enterovirus Not Detected     Influenza A PCR Not Detected     Influenza B PCR Not Detected     Parainfluenza Virus 1 Not Detected     Parainfluenza Virus 2 Not Detected     Parainfluenza Virus 3 Not Detected     Parainfluenza Virus 4 Not Detected     RSV, PCR Not Detected     Bordetella pertussis pcr Not Detected     Bordetella parapertussis PCR Not Detected     Chlamydophila pneumoniae PCR Not Detected     Mycoplasma pneumo by PCR Not Detected    Narrative:      In the setting of a positive respiratory panel with a viral infection PLUS a negative procalcitonin without other underlying concern for bacterial infection, consider observing off antibiotics or discontinuation of antibiotics and continue supportive care. If the respiratory panel is positive for atypical bacterial infection (Bordetella pertussis, Chlamydophila pneumoniae, or Mycoplasma pneumoniae), consider antibiotic de-escalation to target atypical bacterial infection.    Blood Culture - Blood, Hand, Left [[id number]]  (Normal) Collected: 10/23/23 1612    Lab Status: Final result Specimen: Blood from Hand, Left Updated: 10/28/23 1631     Blood Culture No growth at 5 days    Blood Culture - Blood, Arm, Left [[id number]]  (Normal) Collected: 10/23/23 1611    Lab Status: Final result Specimen: Blood from Arm, Left Updated: 10/28/23 1631     Blood Culture No growth at 5 days    Respiratory Culture - Aspirate, ET Suction [[id number]]  (Abnormal)  (Susceptibility) Collected: 10/23/23 1356    Lab Status: Final result Specimen: Aspirate from ET Suction  Updated: 10/25/23 0758     Respiratory Culture Heavy growth (4+) Klebsiella aerogenes      No Normal Respiratory Leanna     Gram Stain Less than 10 Epithelial cells per low power field      Moderate (3+) WBCs seen      Many (4+) Gram negative bacilli      Occasional Gram positive cocci in clusters      Rare (1+) Yeast    Susceptibility        Klebsiella aerogenes      JUAN      Cefepime Susceptible      Ceftazidime Resistant      Ceftriaxone Resistant      Gentamicin Susceptible      Levofloxacin Susceptible      Piperacillin + Tazobactam Resistant      Tetracycline Susceptible      Trimethoprim + Sulfamethoxazole Susceptible                       Susceptibility Comments       Klebsiella aerogenes    Cefazolin sensitivity will not be reported for Enterobacteriaceae in non-urine isolates. If cefazolin is preferred, please call the microbiology lab to request an E-test.  With the exception of urinary-sourced infections, aminoglycosides should not be used as monotherapy.                       Medication Review:       Schedule Meds  [Held by provider] amLODIPine, 10 mg, Nasogastric, Q24H  chlorthalidone, 25 mg, Nasogastric, Daily  enoxaparin, 40 mg, Subcutaneous, Daily  Ferrous Sulfate, 300 mg, Nasogastric, Once per day on Mon Wed Fri  insulin lispro, 4-24 Units, Subcutaneous, Q6H  lansoprazole, 30 mg, Nasogastric, Q AM  levothyroxine, 100 mcg, Nasogastric, Q AM  meropenem, 1,000 mg, Intravenous, Q12H  metoclopramide, 5 mg, Nasogastric, Q8H  pantoprazole, 40 mg, Intravenous, Q12H  rOPINIRole, 0.25 mg, Nasogastric, Nightly  rosuvastatin, 10 mg, Nasogastric, Nightly  senna-docusate sodium, 2 tablet, Nasogastric, BID  sertraline, 150 mg, Nasogastric, Daily  sodium chloride, 10 mL, Intravenous, Q12H  sodium chloride, 10 mL, Intravenous, Q12H  sucralfate, 1 g, Nasogastric, 4x Daily AC & at Bedtime        Infusion Meds         PRN Meds    acetaminophen    cyclobenzaprine    dextrose    dextrose    dextrose    glucagon (human  recombinant)    hydrALAZINE    HYDROmorphone    ipratropium-albuterol    lidocaine    LORazepam    nitroglycerin    ondansetron    oxyCODONE    polyethylene glycol    [COMPLETED] Insert Peripheral IV **AND** sodium chloride    sodium chloride    sodium chloride    sodium chloride    sodium chloride        Assessment & Plan       Antimicrobial Therapy   1.  IV meropenem       2.        3.        4.        5.            Assessment     Acute respiratory failure.  Most likely secondary to hospital-acquired pneumonia.  CT scan of the chest on October 18 showed bilateral lower lobe infiltrates.  There was no additional findings consistent with PCP pneumonia  Patient is s/p bronchoscopy and BAL cultures are negative  MRSA screen was negative  -Patient is now extubated and on 10 L of oxygen by nasal cannula     Severe lymphocytosis.  Most likely secondary to underlying CLL.  Patient was not on treatment prior to admission.  Improving     History of breast cancer s/p mastectomies in the past     UTI with Klebsiella pneumoniae and E. coli.  Organisms were relatively susceptible     Diabetes mellitus type 2     Rhinovirus infection.  Nasal swab was positive for rhinovirus PCR    Mild septic shock-currently off vasopressors    Possible ventilator associated pneumonia.  Tracheal aspirate culture from October 23, 2023 grew Klebsiella aerogenes.  Based on the susceptibility I am suspecting AmpC  pathogen     Plan     Continue meropenem 1 g IV every 12 hours for 7 days-which will be discontinued tomorrow morning  Continue supportive care  A.m. labs  Case discussed with patient's family at bedside      SIOMARA Sanchez  10/30/23  17:31 EDT    Note is dictated utilizing voice recognition software/Dragon

## 2023-10-30 NOTE — PLAN OF CARE
Goal Outcome Evaluation:  Spoke w/ pt's RN and pt w/ coffee ground emesis and increase in O2 needs. Palliative consulted to discuss GOC. Pt not medically appropriate for ST services this date. Will continue to follow peripherally,

## 2023-10-30 NOTE — PROGRESS NOTES
RT was called due to patient desat in spo2, when entered the room patient was coughing, o2 was on 14L HFNC. Patient daughter is in the room with her, RT asked if patient had worn the v60, daughter said no, RT asked what patient respiratory condition is- if COPD or any meds for home, Daughter told RT that patient has COPD and takes breztri at home. I spoke to the nurse about getting oral suction as patient is coughing up sputum, also discussed possibly adding patient home maintenance meds as well as titrating o2 since patient is COPD, currently patient on 7L spo2 92%.

## 2023-10-30 NOTE — SIGNIFICANT NOTE
10/30/23 0952   OTHER   Discipline physical therapist   Rehab Time/Intention   Session Not Performed other (see comments)  (spoke with RN, reports pt SOA on 10L and c/o pain.  not appropirate for PT today.  Will require re-eval next therapy visit.)   Recommendation   PT - Next Appointment 10/31/23

## 2023-10-30 NOTE — PROGRESS NOTES
" LOS: 20 days   Patient Care Team:  Asia Queen APRN as PCP - General (Nurse Practitioner)  Asia Queen APRN as Nurse Practitioner (Nurse Practitioner)  Ling Bolden MD as Consulting Physician (Hematology and Oncology)      Subjective \"I am nauseated and having some back pain\"    Interval History: GI reconsulted for coffee-ground NG output.  Please see initial GI consultation.  Chart reviewed.    Subjective: Some nausea and was having previous vomiting while in ICU.  Complaining of some back pain but denies abdominal pain.  Still with significant dyspnea.  Nurse reports coffee-ground output from NG and daughter at bedside reports coffee-ground emesis prior to hospitalization.    ROS:   Dyspnea, no chest pain     Medication Review:     Current Facility-Administered Medications:     acetaminophen (TYLENOL) tablet 650 mg, 650 mg, Nasogastric, Q4H PRN, Vinicius Heredia DO    [Held by provider] amLODIPine (NORVASC) tablet 10 mg, 10 mg, Nasogastric, Q24H, Foreign Bolton MD, 10 mg at 10/26/23 0802    chlorthalidone (HYGROTON) tablet 25 mg, 25 mg, Nasogastric, Daily, Day, Anita G, APRN, 25 mg at 10/30/23 1009    cyclobenzaprine (FLEXERIL) tablet 10 mg, 10 mg, Nasogastric, TID PRN, Vinicius Heredia DO, 10 mg at 10/28/23 1731    dextrose (D50W) (25 g/50 mL) IV injection 25 g, 25 g, Intravenous, Q15 Min PRN, Kalyan Mckeon MD, 25 g at 10/26/23 0614    dextrose (D50W) (25 g/50 mL) IV injection 50 mL, 50 mL, Intravenous, Q1H PRN, Ra Myers MD, 50 mL at 10/14/23 1235    dextrose (GLUTOSE) oral gel 15 g, 15 g, Nasogastric, Q15 Min PRN, Vinicius Heredia DO    Enoxaparin Sodium (LOVENOX) syringe 40 mg, 40 mg, Subcutaneous, Daily, Foreign Bolton MD, 40 mg at 10/28/23 1635    Ferrous Sulfate 300 (60 Fe) MG/5ML solution 300 mg, 300 mg, Nasogastric, Once per day on Mon Wed Fri, Ra Myers MD, 300 mg at 10/30/23 1008    glucagon (GLUCAGEN) injection 1 mg, 1 mg, " Intramuscular, Q15 Min PRN, Kalyan Mckeon MD    hydrALAZINE (APRESOLINE) injection 10 mg, 10 mg, Intravenous, Q6H PRN, Ramona Wilder, APRN, 10 mg at 10/29/23 0624    HYDROmorphone (DILAUDID) injection 0.5 mg, 0.5 mg, Intravenous, Q3H PRN, Penny Tomlinson APRN, 0.5 mg at 10/29/23 0441    insulin lispro (HUMALOG/ADMELOG) injection 4-24 Units, 4-24 Units, Subcutaneous, Q6H, Vinicius Heredia DO, 4 Units at 10/29/23 0045    ipratropium-albuterol (DUO-NEB) nebulizer solution 3 mL, 3 mL, Nebulization, Q6H PRN, Day, Dawn G, APRN, 3 mL at 10/20/23 1620    lansoprazole (PREVACID SOLUTAB) disintegrating tablet Tablet Delayed Release Dispersible 30 mg, 30 mg, Nasogastric, Q AM, Day, Dawn G, APRN, 30 mg at 10/27/23 0526    levothyroxine (SYNTHROID, LEVOTHROID) tablet 100 mcg, 100 mcg, Nasogastric, Q AM, Day, Dawn G, APRN, 100 mcg at 10/27/23 0526    lidocaine (XYLOCAINE) 5 % ointment, , , PRN, Vinicius Heredia DO, 1 application  at 10/18/23 1449    LORazepam (ATIVAN) injection 0.5 mg, 0.5 mg, Intravenous, Q6H PRN, Ra Myers MD, 0.5 mg at 10/30/23 0802    meropenem (MERREM) 1,000 mg in sodium chloride 0.9 % 100 mL IVPB, 1,000 mg, Intravenous, Q12H, Damian Santos MD, 1,000 mg at 10/30/23 0538    metoclopramide (REGLAN) tablet 5 mg, 5 mg, Oral, Q8H, Ciera Champion, APRN, 5 mg at 10/30/23 1008    nitroglycerin (NITROSTAT) SL tablet 0.4 mg, 0.4 mg, Sublingual, Q5 Min PRN, Kalyan Mckeon MD    ondansetron (ZOFRAN) injection 4 mg, 4 mg, Intravenous, Q6H PRN, Kalyan Mckeon MD, 4 mg at 10/29/23 2214    oxyCODONE (ROXICODONE) immediate release tablet 5 mg, 5 mg, Nasogastric, Q6H PRN, Vinicius Heredia DO, 5 mg at 10/27/23 0200    pantoprazole (PROTONIX) injection 40 mg, 40 mg, Intravenous, Q12H, Fritz Archibald MD, 40 mg at 10/30/23 0804    polyethylene glycol (MIRALAX) packet 17 g, 17 g, Oral, Daily PRN, Foreign Bolton MD    rOPINIRole (REQUIP) tablet 0.25 mg, 0.25 mg, Nasogastric, Nightly,  Foreign Bolton MD, 0.25 mg at 10/26/23 1936    rosuvastatin (CRESTOR) tablet 10 mg, 10 mg, Nasogastric, Nightly, Day, Anita G, APRN, 10 mg at 10/26/23 1936    sennosides-docusate (PERICOLACE) 8.6-50 MG per tablet 2 tablet, 2 tablet, Nasogastric, BID, Foreign Bolton MD, 2 tablet at 10/27/23 0821    sertraline (ZOLOFT) tablet 150 mg, 150 mg, Nasogastric, Daily, Foreign Bolton MD, 150 mg at 10/30/23 1008    [COMPLETED] Insert Peripheral IV, , , Once **AND** sodium chloride 0.9 % flush 10 mL, 10 mL, Intravenous, PRN, Sheree Field PA-C    sodium chloride 0.9 % flush 10 mL, 10 mL, Intravenous, Q12H, Kalyan Mckeon MD, 10 mL at 10/29/23 2220    sodium chloride 0.9 % flush 10 mL, 10 mL, Intravenous, PRN, Kalyan Mckeon MD    sodium chloride 0.9 % flush 10 mL, 10 mL, Intravenous, Q12H, Day, Anita G, APRN, 10 mL at 10/29/23 2220    sodium chloride 0.9 % flush 10 mL, 10 mL, Intravenous, PRN, Day, Anita G, APRN    sodium chloride 0.9 % infusion 40 mL, 40 mL, Intravenous, PRN, Kalyan Mckeon MD    sodium chloride 0.9 % infusion 40 mL, 40 mL, Intravenous, PRN, Day, Anita G, APRN    sucralfate (CARAFATE) tablet 1 g, 1 g, Oral, 4x Daily AC & at Bedtime, Ciera Champion APRN      Objective chronically ill-appearing but no acute distress, daughter at bedside    Vital Signs  Vitals:    10/30/23 0300 10/30/23 0500 10/30/23 0900 10/30/23 1009   BP: 169/57 154/57  166/65   BP Location:  Left arm     Patient Position:  Lying     Pulse: 81 83  80   Resp:  18 17    Temp:  98 °F (36.7 °C) 97.5 °F (36.4 °C)    TempSrc:  Oral Oral    SpO2: (!) 88% 97%     Weight:       Height:           Physical Exam:     General Appearance:    Awake and alert, in no acute distress, central obesity   Head:    Normocephalic, without obvious abnormality   Eyes:          Conjunctivae normal, anicteric sclera   Throat:   No oral lesions, no thrush, oral mucosa moist, NG to continuous low suction with dark liquid output noted   Neck:    supple, no  JVD   Lungs:     mild dyspnea at rest, 10 L nasal cannula   Abdomen:     Soft, non-tender, nondistended   Rectal:     Deferred   Extremities:  no cyanosis   Skin:   No bruising or rash, no jaundice        Results Review:    CBC    Results from last 7 days   Lab Units 10/29/23  2331 10/29/23  0800 10/29/23  0324 10/28/23  1344 10/27/23  0428 10/26/23  0310 10/25/23  0527   WBC 10*3/mm3 59.40* 65.20* 60.50* 26.10* 19.40* 21.90* 18.30*   HEMOGLOBIN g/dL 9.1* 9.2* 8.7* 9.4* 7.6* 7.9* 7.7*   PLATELETS 10*3/mm3 546* 568* 509* 316 250 230 172     CMP   Results from last 7 days   Lab Units 10/29/23  0121 10/28/23  0625 10/27/23  0428 10/26/23  0310 10/25/23  0527 10/24/23  0307   SODIUM mmol/L 142 142 139 141 138 143   POTASSIUM mmol/L 4.1 4.5 3.9 3.6 3.8 4.0   CHLORIDE mmol/L 102 108* 103 103 100 103   CO2 mmol/L 26.0 23.0 26.0 26.0 24.0 27.0   BUN mg/dL 73* 75* 101* 103* 108* 93*   CREATININE mg/dL 1.15* 0.99 1.27* 1.38* 1.43* 1.67*   GLUCOSE mg/dL 176* 135* 174* 84 204* 244*   ALBUMIN g/dL 3.2* 3.0* 2.6* 2.8* 2.6* 3.1*   BILIRUBIN mg/dL 0.2 <0.2 <0.2 0.2 <0.2 0.2   ALK PHOS U/L 137* 123* 109 118* 118* 127*   AST (SGOT) U/L 22 18 16 22 22 30   ALT (SGPT) U/L 14 16 15 22 22 24   MAGNESIUM mg/dL 2.6* 2.7* 2.5* 2.3 2.1 1.9   PHOSPHORUS mg/dL  --   --   --   --   --  2.8     Cr Clearance Estimated Creatinine Clearance: 37.5 mL/min (A) (by C-G formula based on SCr of 1.15 mg/dL (H)).  Coag     HbA1C   Lab Results   Component Value Date    HGBA1C 6.50 (H) 10/13/2023    HGBA1C 6.00 (H) 05/15/2023    HGBA1C 6.9 (H) 08/24/2022     Blood Glucose   Glucose   Date/Time Value Ref Range Status   10/30/2023 0610 135 (H) 70 - 105 mg/dL Final     Comment:     Serial Number: [id number]:  [id number]   10/29/2023 2346 158 (H) 70 - 105 mg/dL Final     Comment:     Serial Number: [id number]:  [id number]   10/29/2023 1612 171 (H) 70 - 105 mg/dL Final     Comment:     Serial Number: [id number]:  [id number]   10/29/2023 1100 155  (H) 70 - 105 mg/dL Final     Comment:     Serial Number: [id number]:  [id number]   10/29/2023 0603 169 (H) 70 - 105 mg/dL Final     Comment:     Serial Number: [id number]:  [id number]   10/29/2023 0013 170 (H) 70 - 105 mg/dL Final     Comment:     Serial Number: [id number]:  [id number]   10/28/2023 1718 150 (H) 70 - 105 mg/dL Final     Comment:     Serial Number: [id number]:  [id number]   10/28/2023 1204 136 (H) 70 - 105 mg/dL Final     Comment:     Serial Number: [id number]:  [id number]     Infection   Results from last 7 days   Lab Units 10/23/23  1612 10/23/23  1611 10/23/23  1356   BLOODCX  No growth at 5 days No growth at 5 days  --    RESPCX   --   --  Heavy growth (4+) Klebsiella aerogenes*  No Normal Respiratory Leanna*     UA      Radiology(recent) XR Chest 1 View    Result Date: 10/30/2023  Impression: 1. Placement of esophagogastric tube with tip below the diaphragm. 2. Mild bibasilar airspace disease likely atelectasis with small bilateral pleural effusions, unchanged. Electronically Signed: Pavan Amezquita MD  10/30/2023 7:08 AM EDT  Workstation ID: [id number]    XR Abdomen KUB    Result Date: 10/29/2023  Impression: 1. Esophagogastric tube tip overlies the distal stomach. 2. Nonspecific, nonobstructive bowel gas pattern. Electronically Signed: Pavan Amezquita MD  10/29/2023 5:11 PM EDT  Workstation ID: [id number]    XR Abdomen KUB    Result Date: 10/29/2023  Impression: Esophagogastric tube nonvisualized overlying the lower chest or upper abdomen. Recommend repositioning and follow-up KUB to evaluate for position. Electronically Signed: Pavan Amezquita MD  10/29/2023 3:28 PM EDT  Workstation ID: [id number]    XR Chest 1 View    Result Date: 10/29/2023  Impression: Persistent bibasilar airspace disease and medial right basilar atelectasis. Electronically Signed: Miguel Trent MD  10/29/2023 1:01 AM EDT  Workstation ID: [id number]    XR Abdomen KUB    Result Date: 10/28/2023  Impression: 1.  Smallbore feeding tube with tip terminating at the distal stomach or proximal duodenum. Electronically Signed: Antony Hayeselor  10/28/2023 12:23 PM EDT  Workstation ID: [id number]        Assessment & Plan   Nausea with coffee-ground NG output-vomiting resolved  Elevated LFTs -improved  Normocytic anemia  Acute respiratory failure -likely hospital-acquired pneumonia  Severe lymphocytosis   UTI with Klebsiella pneumoniae/E. Coli  Hypertriglyceridemia  Rhinovirus infection  LEXUS/CKD  DMII  CLL  History of breast cancer  History of hiatal hernia repair with gastropexy     PLAN:  Patient is an 83-year-old female with history of DMII and hiatal hernia repair with gastropexy who presented 10/10/2023 with complaints of right rib and back pain following a fall.  Hospitalization complicated by acute respiratory failure secondary pneumonia and rhinovirus.  She also had sepsis with UTI.  GI originally consulted for elevated LFTs and now reconsulted for coffee-ground NG output.    Hemoglobin 9.1, 9.2 yesterday.  Coffee-ground emesis with NG placement overnight and daughter at bedside reports coffee-ground emesis during admission as well.  Unfortunately, she is a poor candidate for sedation/EGD at this time as she would likely require intubation and she is currently a DNI.  We will start twice daily PPI, Carafate 4 times daily and low-dose Reglan.  Okay for tube feeds as tolerated per dietitian recommendation.  We will monitor hemoglobin.  Plan EGD for life-threatening GI bleed only at this time.  Monitor hemoglobin and transfuse as needed.  Discussed with bedside RN this morning on rounds.  We will follow.    Electronically signed by SIOMARA Garcia, 10/30/23, 10:10 AM EDT.

## 2023-10-30 NOTE — NURSING NOTE
GI consulted for coffee ground gastric fluid coming out of DHT. DHT was removed and a NG for suction was placed. GI gave orders to give 80mg IV Protonix STAT and schedule 40 mg Protonix Q12 start 0900 10/30. Poss EGD, MD will see patient in AM. From 0212-2893 there was a total of 1400 cc of gastric content out.

## 2023-10-30 NOTE — PROGRESS NOTES
Hematology/Oncology Inpatient Progress Note    PATIENT NAME: Diane Guan  : 1940  MRN: [id number]    CHIEF COMPLAINT: Falls, respiratory failure, leukocytosis with lymphocytosis    HISTORY OF PRESENT ILLNESS:    Diane Guan is a 83 y.o. female who presented to [facility] on 10/10/2023 with complaints of      MsZachery Guan was admitted to the hospital on 10/10/2023. She had fallen, sustaining some trauma to the chest. She complained of weakness of the right lower extremity. In addition she had a respiratory panel positive for rhinovirus. She had been treated in the past for early stage breast cancer and had remained free of evidence of recurrent disease. In addition she was known for chronic lymphocytic leukemia and had maintained significant lymphocytosis for some time but had never needed treatment. At the time of the admission she had maintained a similar white cell count. Today for the first time she had a very significant increase in the total white cell count and platelets. In addition she had significant thrombocytosis.   In spite of all efforts she had very slow improvement. She had continued to have difficulties with weakness and anorexia. She developed pneumonia and had to be intubated; she was finally extubated on 10/27.  On 10/23/2023 she was found to have positive blood cultures for E coli and Klebsiella. At the time of this consult she was still on antibiotics. She also had had evidence of gastrointestinal bleeding; she was diagnosed with iron deficiency and was started on iron but iron studies were not available.      He/She  has a past medical history of Acute bronchitis due to human metapneumovirus (2020), Anxiety disorder, Arthritis, Breast cancer (2019), Chronic lymphocytic leukemia (CLL), B-cell (2019), Depression, Disease of thyroid gland, Diverticulosis of colon (), Dysphagia (2020), Dyspnea on exertion, Fall at home (10/11/2023), Hemorrhoid,  Hiatal hernia (12/2020), Hiatal hernia with GERD, History of breast cancer (10/11/2023), History of cigarette smoking (10/11/2023), History of diabetes mellitus, Hyperlipidemia, Hypertension, Mycobacterium avium complex (02/2019), OAB (overactive bladder), Personal history of CLL (chronic lymphocytic leukemia) (10/11/2023), Skin cancer, and Sleep apnea.     PCP: Asia Queen APRN    Subjective     Patient is very weak answers a few questions    ROS:  Review of Systems   Constitutional:  Positive for activity change, appetite change and fatigue. Negative for chills and fever.   HENT:  Negative for congestion, drooling, ear discharge, rhinorrhea, sinus pressure and tinnitus.    Eyes:  Negative for photophobia, pain and discharge.   Respiratory:  Negative for apnea, choking and stridor.    Cardiovascular:  Negative for palpitations.   Gastrointestinal:  Negative for abdominal distention, abdominal pain and anal bleeding.   Endocrine: Negative for polydipsia and polyphagia.   Genitourinary:  Negative for decreased urine volume, flank pain and genital sores.   Musculoskeletal:  Negative for gait problem, neck pain and neck stiffness.   Skin:  Negative for color change, rash and wound.   Neurological:  Positive for weakness. Negative for tremors, seizures, syncope, facial asymmetry and speech difficulty.   Hematological:  Negative for adenopathy.   Psychiatric/Behavioral:  Negative for agitation, confusion, hallucinations and self-injury. The patient is not hyperactive.         MEDICATIONS:    Scheduled Meds:  [Held by provider] amLODIPine, 10 mg, Nasogastric, Q24H  chlorthalidone, 25 mg, Nasogastric, Daily  enoxaparin, 40 mg, Subcutaneous, Daily  Ferrous Sulfate, 300 mg, Nasogastric, Once per day on Mon Wed Fri  insulin lispro, 4-24 Units, Subcutaneous, Q6H  lansoprazole, 30 mg, Nasogastric, Q AM  levothyroxine, 100 mcg, Nasogastric, Q AM  meropenem, 1,000 mg, Intravenous, Q12H  metoclopramide, 5 mg, Nasogastric,  "Q8H  pantoprazole, 40 mg, Intravenous, Q12H  rOPINIRole, 0.25 mg, Nasogastric, Nightly  rosuvastatin, 10 mg, Nasogastric, Nightly  senna-docusate sodium, 2 tablet, Nasogastric, BID  sertraline, 150 mg, Nasogastric, Daily  sodium chloride, 10 mL, Intravenous, Q12H  sodium chloride, 10 mL, Intravenous, Q12H  sucralfate, 1 g, Nasogastric, 4x Daily AC & at Bedtime       Continuous Infusions:      PRN Meds:    acetaminophen    cyclobenzaprine    dextrose    dextrose    dextrose    glucagon (human recombinant)    hydrALAZINE    HYDROmorphone    ipratropium-albuterol    lidocaine    LORazepam    nitroglycerin    ondansetron    oxyCODONE    polyethylene glycol    [COMPLETED] Insert Peripheral IV **AND** sodium chloride    sodium chloride    sodium chloride    sodium chloride    sodium chloride     ALLERGIES:  No Known Allergies    Objective    VITALS:   /61   Pulse 87   Temp 98.3 °F (36.8 °C) (Oral)   Resp 22   Ht 160 cm (63\")   Wt 78.9 kg (173 lb 15.1 oz)   SpO2 92%   BMI 30.81 kg/m²     PHYSICAL EXAM: (performed by MD)  Physical Exam  Constitutional:       General: She is in acute distress.      Appearance: She is ill-appearing.   HENT:      Head:      Comments: Ng tube in palce     Mouth/Throat:      Mouth: Mucous membranes are moist.      Pharynx: Oropharynx is clear.   Eyes:      Extraocular Movements: Extraocular movements intact.      Pupils: Pupils are equal, round, and reactive to light.   Pulmonary:      Effort: Pulmonary effort is normal.   Neurological:      General: No focal deficit present.      Mental Status: Mental status is at baseline.           RECENT LABS:  Lab Results (last 24 hours)       Procedure Component Value Units Date/Time    Magnesium [[id number]]  (Abnormal) Collected: 10/30/23 1212    Specimen: Blood Updated: 10/30/23 1245     Magnesium 2.5 mg/dL     Comprehensive Metabolic Panel [[id number]]  (Abnormal) Collected: 10/30/23 1212    Specimen: Blood Updated: 10/30/23 1245     Glucose " 154 mg/dL      BUN 69 mg/dL      Creatinine 1.19 mg/dL      Sodium 148 mmol/L      Potassium 4.0 mmol/L      Chloride 103 mmol/L      CO2 28.0 mmol/L      Calcium 9.6 mg/dL      Total Protein 6.9 g/dL      Albumin 3.5 g/dL      ALT (SGPT) 16 U/L      AST (SGOT) 20 U/L      Alkaline Phosphatase 148 U/L      Total Bilirubin 0.2 mg/dL      Globulin 3.4 gm/dL      A/G Ratio 1.0 g/dL      BUN/Creatinine Ratio 58.0     Anion Gap 17.0 mmol/L      eGFR 45.7 mL/min/1.73     Narrative:      GFR Normal >60  Chronic Kidney Disease <60  Kidney Failure <15    The GFR formula is only valid for adults with stable renal function between ages 18 and 70.    POC Glucose Once [[id number]]  (Abnormal) Collected: 10/30/23 1215    Specimen: Blood Updated: 10/30/23 1217     Glucose 149 mg/dL      Comment: Serial Number: [id number]:  [id number]       POC Glucose Once [[id number]]  (Abnormal) Collected: 10/30/23 0610    Specimen: Blood Updated: 10/30/23 0611     Glucose 135 mg/dL      Comment: Serial Number: [id number]:  [id number]       CBC & Differential [[id number]]  (Abnormal) Collected: 10/29/23 2331    Specimen: Blood Updated: 10/30/23 0145    Narrative:      The following orders were created for panel order CBC & Differential.  Procedure                               Abnormality         Status                     ---------                               -----------         ------                     CBC Auto Differential[[id number]]        Abnormal            Final result               Scan Slide[[id number]]                                       Final result                 Please view results for these tests on the individual orders.    Scan Slide [[id number]] Collected: 10/29/23 2331    Specimen: Blood Updated: 10/30/23 0145     Scan Slide --     Comment: See Manual Differential Results       Manual Differential [[id number]]  (Abnormal) Collected: 10/29/23 2331    Specimen: Blood Updated: 10/30/23 0145     Neutrophil % 18.0 %       Lymphocyte % 79.0 %      Monocyte % 1.0 %      Eosinophil % 2.0 %      Neutrophils Absolute 10.69 10*3/mm3      Lymphocytes Absolute 46.93 10*3/mm3      Monocytes Absolute 0.59 10*3/mm3      Eosinophils Absolute 1.19 10*3/mm3      Anisocytosis Slight/1+     Poikilocytes Slight/1+     Smudge Cells Slight/1+     Platelet Estimate Increased    CBC Auto Differential [[id number]]  (Abnormal) Collected: 10/29/23 2331    Specimen: Blood Updated: 10/30/23 0145     WBC 59.40 10*3/mm3      RBC 3.42 10*6/mm3      Hemoglobin 9.1 g/dL      Hematocrit 29.4 %      MCV 86.0 fL      MCH 26.5 pg      MCHC 30.8 g/dL      RDW 15.6 %      RDW-SD 48.6 fl      MPV 9.7 fL      Platelets 546 10*3/mm3     Narrative:      The previously reported component NRBC is no longer being reported. Previous result was 0.0 /100 WBC (Reference Range: 0.0-0.2 /100 WBC) on 10/29/2023 at 2351 EDT.    POC Glucose Once [[id number]]  (Abnormal) Collected: 10/29/23 2346    Specimen: Blood Updated: 10/29/23 2347     Glucose 158 mg/dL      Comment: Serial Number: [id number]:  [id number]       Haptoglobin [[id number]]  (Abnormal) Collected: 10/29/23 0121    Specimen: Blood Updated: 10/29/23 1815     Haptoglobin 472 mg/dL      Comment: Specimen hemolyzed. Results may be affected.               PENDING RESULTS:     IMAGING REVIEWED:  XR Chest 1 View    Result Date: 10/30/2023  Impression: 1. Placement of esophagogastric tube with tip below the diaphragm. 2. Mild bibasilar airspace disease likely atelectasis with small bilateral pleural effusions, unchanged. Electronically Signed: Pavan Amezquita MD  10/30/2023 7:08 AM EDT  Workstation ID: [id number]    XR Abdomen KUB    Result Date: 10/29/2023  Impression: 1. Esophagogastric tube tip overlies the distal stomach. 2. Nonspecific, nonobstructive bowel gas pattern. Electronically Signed: Pavan Amezquita MD  10/29/2023 5:11 PM EDT  Workstation ID: [id number]    XR Abdomen KUB    Result Date: 10/29/2023  Impression:  Esophagogastric tube nonvisualized overlying the lower chest or upper abdomen. Recommend repositioning and follow-up KUB to evaluate for position. Electronically Signed: Pavan Amezquita MD  10/29/2023 3:28 PM EDT  Workstation ID: [id number]    XR Chest 1 View    Result Date: 10/29/2023  Impression: Persistent bibasilar airspace disease and medial right basilar atelectasis. Electronically Signed: Miguel Trent MD  10/29/2023 1:01 AM EDT  Workstation ID: [id number]     Assessment & Plan   ASSESSMENT:  Chronic lymphocytic leukemia: First diagnosed at the end of 2022 and she has not needed treatment since the diagnosis.  Her condition is complicated at this time but the severity of her present illness.  The sudden marked increase in the white cell count is most likely not representative of the malignant disease but rather connected to the present illness. 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  Normocytic anemia: Has experienced coffee-ground emesis and gastroenterology is following but patient is a poor candidate for sedation/EGD.  They plan for EGD only for life-threatening GI bleed at this time.Haptoglobin was elevated indicating low likelihood of hemolytic anemia.  History of stage II right breast cancer status post right mastectomy.  ER positive, DE positive, HER2/juan luis negative.  On Arimidex.    PLAN:  Monitor CBC and transfuse for hemoglobin less than 7.0 g/dL  Continue IV antibiotics and supportive care        This is an 83-year-old female with a history of CLL on observation therapy.  Patient comes in to the hospital with respiratory failure secondary to rhinoviral infection.  She has had difficulty with weakness and anorexia.  Patient was initially intubated but extubated on 10/27/2023.  She will also found to have positive blood cultures E. coli and Klebsiella.  Patient is currently on antibiotics.   "She is being seen by infectious disease  Physical exam she appears acutely ill  NG in place  No focal findings  Continue IV antibiotics currently on IV meropenem which will be completed for 7-day course on 10/31/2023  Continue supportive care  She has leukocytosis likely secondary to CLL superimposed by concurrent infection  We will check IgG levels  Continue current care  \"Monitor her labs very closely.      Electronically signed by Ling Bolden MD, 10/30/23, 6:47 PM EDT.         "

## 2023-10-30 NOTE — PROGRESS NOTES
"RENAL/KCC:     LOS: 20 days    Patient Care Team:  Asia Queen APRN as PCP - General (Nurse Practitioner)  Asia Queen APRN as Nurse Practitioner (Nurse Practitioner)  Ling Bolden MD as Consulting Physician (Hematology and Oncology)    Chief Complaint:  Acute resp failure    Subjective     Interval History:   Chart reviewed  Out of ICU, off drips/pressors  Remains on tube feeds  Denies shortness of breath or chest pain, O2 sats upper 90s now on 2 L  Discussed with nurse and family    Objective     Vital Sign Min/Max for last 24 hours  Temp  Min: 97.5 °F (36.4 °C)  Max: 98.4 °F (36.9 °C)   BP  Min: 140/51  Max: 169/57   Pulse  Min: 79  Max: 91   Resp  Min: 17  Max: 23   SpO2  Min: 88 %  Max: 97 %   Flow (L/min)  Min: 7  Max: 14   No data recorded     Flowsheet Rows      Flowsheet Row First Filed Value   Admission Height 162.6 cm (64\") Documented at 10/10/2023 1252   Admission Weight 78.5 kg (173 lb) Documented at 10/10/2023 1252            I/O this shift:  In: -   Out: 1100 [Urine:1100]  I/O last 3 completed shifts:  In: -   Out: 1400 [Emesis/NG output:1400]    Physical Exam:  GEN: Sleeping  ENT: OP clear, +DHT  NECK: Supple, no JVD  CHEST: Lungs are clear  CV: RRR, no M/G/R, no peripheral edema  ABD: Soft, NT, +BS  SKIN: Warm and Dry  NEURO: CN's intact      WBC WBC   Date Value Ref Range Status   10/29/2023 59.40 (C) 3.40 - 10.80 10*3/mm3 Final   10/29/2023 65.20 (C) 3.40 - 10.80 10*3/mm3 Final   10/29/2023 60.50 (C) 3.40 - 10.80 10*3/mm3 Final   10/28/2023 26.10 (H) 3.40 - 10.80 10*3/mm3 Final      HGB Hemoglobin   Date Value Ref Range Status   10/29/2023 9.1 (L) 12.0 - 15.9 g/dL Final   10/29/2023 9.2 (L) 12.0 - 15.9 g/dL Final   10/29/2023 8.7 (L) 12.0 - 15.9 g/dL Final   10/28/2023 9.4 (L) 12.0 - 15.9 g/dL Final      HCT Hematocrit   Date Value Ref Range Status   10/29/2023 29.4 (L) 34.0 - 46.6 % Final   10/29/2023 30.8 (L) 34.0 - 46.6 % Final   10/29/2023 29.3 (L) 34.0 - 46.6 % Final " "  10/28/2023 29.9 (L) 34.0 - 46.6 % Final      Platlets No results found for: \"LABPLAT\"   MCV MCV   Date Value Ref Range Status   10/29/2023 86.0 79.0 - 97.0 fL Final   10/29/2023 88.1 79.0 - 97.0 fL Final   10/29/2023 88.1 79.0 - 97.0 fL Final   10/28/2023 87.0 79.0 - 97.0 fL Final          Sodium Sodium   Date Value Ref Range Status   10/30/2023 148 (H) 136 - 145 mmol/L Final   10/29/2023 142 136 - 145 mmol/L Final   10/28/2023 142 136 - 145 mmol/L Final      Potassium Potassium   Date Value Ref Range Status   10/30/2023 4.0 3.5 - 5.2 mmol/L Final   10/29/2023 4.1 3.5 - 5.2 mmol/L Final   10/28/2023 4.5 3.5 - 5.2 mmol/L Final     Comment:     Slight hemolysis detected by analyzer. Results may be affected.      Chloride Chloride   Date Value Ref Range Status   10/30/2023 103 98 - 107 mmol/L Final   10/29/2023 102 98 - 107 mmol/L Final   10/28/2023 108 (H) 98 - 107 mmol/L Final      CO2 CO2   Date Value Ref Range Status   10/30/2023 28.0 22.0 - 29.0 mmol/L Final   10/29/2023 26.0 22.0 - 29.0 mmol/L Final   10/28/2023 23.0 22.0 - 29.0 mmol/L Final      BUN BUN   Date Value Ref Range Status   10/30/2023 69 (H) 8 - 23 mg/dL Final   10/29/2023 73 (H) 8 - 23 mg/dL Final   10/28/2023 75 (H) 8 - 23 mg/dL Final      Creatinine Creatinine   Date Value Ref Range Status   10/30/2023 1.19 (H) 0.57 - 1.00 mg/dL Final   10/29/2023 1.15 (H) 0.57 - 1.00 mg/dL Final   10/28/2023 0.99 0.57 - 1.00 mg/dL Final      Calcium Calcium   Date Value Ref Range Status   10/30/2023 9.6 8.6 - 10.5 mg/dL Final   10/29/2023 9.3 8.6 - 10.5 mg/dL Final   10/28/2023 9.0 8.6 - 10.5 mg/dL Final      PO4 No results found for: \"CAPO4\"   Albumin Albumin   Date Value Ref Range Status   10/30/2023 3.5 3.5 - 5.2 g/dL Final   10/29/2023 3.2 (L) 3.5 - 5.2 g/dL Final   10/28/2023 3.0 (L) 3.5 - 5.2 g/dL Final      Magnesium Magnesium   Date Value Ref Range Status   10/30/2023 2.5 (H) 1.6 - 2.4 mg/dL Final   10/29/2023 2.6 (H) 1.6 - 2.4 mg/dL Final   10/28/2023 " "2.7 (H) 1.6 - 2.4 mg/dL Final      Uric Acid No results found for: \"URICACID\"        Results Review:     I reviewed the patient's new clinical results.    [Held by provider] amLODIPine, 10 mg, Nasogastric, Q24H  chlorthalidone, 25 mg, Nasogastric, Daily  enoxaparin, 40 mg, Subcutaneous, Daily  Ferrous Sulfate, 300 mg, Nasogastric, Once per day on Mon Wed Fri  insulin lispro, 4-24 Units, Subcutaneous, Q6H  lansoprazole, 30 mg, Nasogastric, Q AM  levothyroxine, 100 mcg, Nasogastric, Q AM  meropenem, 1,000 mg, Intravenous, Q12H  metoclopramide, 5 mg, Oral, Q8H  pantoprazole, 40 mg, Intravenous, Q12H  rOPINIRole, 0.25 mg, Nasogastric, Nightly  rosuvastatin, 10 mg, Nasogastric, Nightly  senna-docusate sodium, 2 tablet, Nasogastric, BID  sertraline, 150 mg, Nasogastric, Daily  sodium chloride, 10 mL, Intravenous, Q12H  sodium chloride, 10 mL, Intravenous, Q12H  sucralfate, 1 g, Oral, 4x Daily AC & at Bedtime             Medication Review: Reviewed    Assessment & Plan   LEXUS/CKD 3a  Sepsis  PNA  UTI    Acute hypoxemic respiratory failure    Mixed anxiety and depressive disorder    Primary osteoarthritis involving multiple joints    Hiatal hernia with gastroesophageal reflux    Mixed hyperlipidemia    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    Acquired hypothyroidism    Type 2 diabetes mellitus with stage 3b chronic kidney disease, without long-term current use of insulin    Insomnia    Overactive bladder    Vitamin B 12 deficiency    Vitamin D deficiency    LIBBY (obstructive sleep apnea)    COPD (chronic obstructive pulmonary disease)    CKD (chronic kidney disease) stage 3, GFR 30-59 ml/min    History of breast cancer    Personal history of CLL (chronic lymphocytic leukemia)    History of cigarette smoking    Fall at home    Rhinovirus infection    Acute respiratory failure    Hyperkalemia    Respiratory failure    Pleural effusion      Plan:   Renal function stabilizing  She looks euvolemic on exam today " after IV Lasix  CODE STATUS DNR/DNI  Electrolytes and volume at goal  Chest x-ray with bibasilar airspace disease small effusions but no pulmonary edema  Hold additional IV diuretics today  Continue current BP regimen    Monitor electrolytes and volume closely.  Follow-up a.m. labs    Kyle Baugh MD  [facility]  10/30/23  15:30 EDT

## 2023-10-30 NOTE — PROGRESS NOTES
[facility] Medicine Services   Daily Progress Note    Patient Name: Diane Guan  : 1940  MRN: [id number]  Primary Care Physician:  Asia Queen, SIOMARA  Date of admission: 10/10/2023    Subjective      Chief Complaint: Patient came in after a fall with right rib and right-sided back pain.     History of Present Illness: Diane Guan is a 83 y.o. female with past medical history of DM 2, hypertension and breast cancer who presented to [facility] on 10/10/2023 complaining of fall in the bathroom today with hitting her right side of the rib cage and right back with pain.  She says that her right leg has a little problem giving away at times and gave her while she was in the restroom today and she fell hitting her right side of the chest wall and right back on the commode.  She had pretty significant pain and that is what brought her here.  She says that she had some cough going on for about 3 weeks and was given a Z-Erick with which her cough is getting better.  She denies any fever chills shortness of breath nausea vomiting diarrhea constipation abdominal pain hematemesis hematochezia melena hemoptysis dysuria or hematuria.  Patient received a dose of Dilaudid in the ER and then her oxygen saturation dropped and she had to be started on 2 L of oxygen by nasal cannula.  I believe the Dilaudid caused some respiratory depression on top of her difficulty to take a deep breath anyways because of the pain secondary to fall has caused this acute respiratory failure.       10/29/2023 patient seen and examined in bed no acute distress, family at bedside, blood pressure 180/72.  Dyspnea on 15 L.  We were able to decrease oxygen to 8 L saturation remained 94%.  Discussed with RN.  Tolerating antibiotics.  Afebrile,  10/30/2023 patient seen and examined in bed  patient with significant dyspnea on 10 L, dysphagia, now with NG tube.  Discussed with RN.  Palliative care consulted.  Patient now  DNR.    Review of Systems   Constitutional: Negative for chills, fever and night sweats.   HENT:  Positive for congestion. Negative for hoarse voice and sore throat.    Eyes:  Negative for blurred vision and double vision.   Cardiovascular:  Positive for chest pain. Negative for dyspnea on exertion, leg swelling, near-syncope, orthopnea, palpitations and syncope.   Respiratory:  Positive for cough. Negative for hemoptysis, shortness of breath, sputum production and wheezing.    Endocrine: Negative for cold intolerance and heat intolerance.   Skin:  Negative for itching and rash.   Musculoskeletal:  Positive for back pain and myalgias. Negative for neck pain and stiffness.        Patient had a fall today and last fall was about a year ago.   Gastrointestinal:  Negative for abdominal pain, constipation, diarrhea, hematemesis, hematochezia, melena, nausea and vomiting.   Genitourinary:  Negative for dysuria, frequency, hematuria and urgency.   Neurological:  Negative for excessive daytime sleepiness, dizziness, focal weakness, headaches, light-headedness, loss of balance and weakness.   Psychiatric/Behavioral:  Negative for altered mental status, depression and hallucinations.       Objective      Vitals:   Temp:  [97.5 °F (36.4 °C)-98.4 °F (36.9 °C)] 97.5 °F (36.4 °C)  Heart Rate:  [79-91] 80  Resp:  [17-24] 17  BP: (140-176)/(51-69) 166/65  Flow (L/min):  [8-10] 10    Physical Exam     Constitutional:       General: She is awake. She is not in acute distress.     Appearance: Normal appearance.   HENT:      Mouth/Throat:      Mouth: Mucous membranes are moist.      Pharynx: Oropharynx is clear.   Eyes:      General: No scleral icterus.     Extraocular Movements: Extraocular movements intact.      Conjunctiva/sclera: Conjunctivae normal.   Cardiovascular:      Rate and Rhythm: Normal rate.      Heart sounds: No murmur heard.     No gallop.   Pulmonary:      Breath sounds: Rales (minimal bibasilar) present. No wheezing.    Abdominal:      General: Bowel sounds are normal.      Palpations: Abdomen is soft.      Tenderness: There is no abdominal tenderness.   Musculoskeletal:         General: No tenderness.      Right lower leg: No edema.      Left lower leg: No edema.   Neurological:      Mental Status: She is alert.      Comments: Alert, Awake, oriented to place and person. Generalized weakness   Psychiatric:         Behavior: Behavior is cooperative.          Result Review    Result Review:  I have personally reviewed the results from the time of this admission to 10/30/2023 11:50 EDT and agree with these findings:  [x]  Laboratory  [x]  Microbiology  [x]  Radiology  []  EKG/Telemetry   []  Cardiology/Vascular   []  Pathology  []  Old records  []  Other:  CMP:        Lab 10/29/23  0121 10/28/23  0625 10/27/23  0428 10/26/23  0310 10/25/23  0527 10/24/23  0307   SODIUM 142 142 139 141 138 143   POTASSIUM 4.1 4.5 3.9 3.6 3.8 4.0   CHLORIDE 102 108* 103 103 100 103   CO2 26.0 23.0 26.0 26.0 24.0 27.0   ANION GAP 14.0 11.0 10.0 12.0 14.0 13.0   BUN 73* 75* 101* 103* 108* 93*   CREATININE 1.15* 0.99 1.27* 1.38* 1.43* 1.67*   EGFR 47.7* 57.0* 42.3* 38.3* 36.7* 30.5*   GLUCOSE 176* 135* 174* 84 204* 244*   CALCIUM 9.3 9.0 8.5* 9.0 9.0 9.3   MAGNESIUM 2.6* 2.7* 2.5* 2.3 2.1 1.9   PHOSPHORUS  --   --   --   --   --  2.8   TOTAL PROTEIN 6.3 5.5* 5.2* 5.5* 5.4* 6.3   ALBUMIN 3.2* 3.0* 2.6* 2.8* 2.6* 3.1*   GLOBULIN 3.1 2.5 2.6 2.7 2.8 3.2   ALT (SGPT) 14 16 15 22 22 24   AST (SGOT) 22 18 16 22 22 30   BILIRUBIN 0.2 <0.2 <0.2 0.2 <0.2 0.2   ALK PHOS 137* 123* 109 118* 118* 127*    CBC:      Lab 10/29/23  2331 10/29/23  0800 10/29/23  0324 10/28/23  1344 10/27/23  0428 10/26/23  0310 10/25/23  0527   WBC 59.40* 65.20* 60.50* 26.10* 19.40* 21.90* 18.30*   HEMOGLOBIN 9.1* 9.2* 8.7* 9.4* 7.6* 7.9* 7.7*   HEMATOCRIT 29.4* 30.8* 29.3* 29.9* 24.9* 25.7* 24.4*   PLATELETS 546* 568* 509* 316 250 230 172   NEUTROS ABS 10.69* 11.74* 24.81* 11.22* 6.60  7.45* 8.97*   EOS ABS 1.19*  --   --  0.52* 0.97* 0.66* 0.18   MCV 86.0 88.1 88.1 87.0 88.9 87.2 86.6        Wounds (last 24 hours)               Assessment & Plan      Brief Patient Summary:  Diane Guan is a 83 y.o. female with PMH of diabetes, hypertension, breast cancer presented to the hospital after a fall and was admitted with a principal diagnosis of Acute hypoxemic respiratory failure.  On admission, patient required 2 L nasal cannula and subsequently had worsening oxygenation with hypercapnic respiratory failure.  Failed BiPAP and subsequently was intubated on 10/15.  Initially treated with broad-spectrum antibiotics and ID was consulted.  RVP positive for rhinovirus, sputum cultures were negative.  Treated with diuretics for fluid overload.  Developed profound hypoxia and needed deep sedation.  Had bronch on 10/18.  Oxygenation improved with aggressive diuretics.  Sputum cultures grew Klebsiella and treated with meropenem.  Subsequently, extubated on 10/25 but developed hypoxia afterwards, failed BiPAP and had to be reintubated.  Also had LEXUS on admission, nephrology was consulted.  Renal ultrasound with no obstruction.  Treated with diuretics.  Subsequently, urine cultures grew both E. coli and Klebsiella pneumonia.  Treated with Zosyn for 7 days. During the hospital course, patient also developed elevated liver enzymes. Gallbladder ultrasound showed dilated common bile duct measuring 14 mm with distended gallbladder and sludge.  GI was consulted and recommended conservative management.  Liver enzymes subsequently normalized.  Subsequently, family decided on DNR/DNI prior to extubation on 10/27.  Did well with BiPAP.        [Held by provider] amLODIPine, 10 mg, Nasogastric, Q24H  chlorthalidone, 25 mg, Nasogastric, Daily  enoxaparin, 40 mg, Subcutaneous, Daily  Ferrous Sulfate, 300 mg, Nasogastric, Once per day on Mon Wed Fri  insulin lispro, 4-24 Units, Subcutaneous, Q6H  lansoprazole, 30 mg,  Nasogastric, Q AM  levothyroxine, 100 mcg, Nasogastric, Q AM  meropenem, 1,000 mg, Intravenous, Q12H  metoclopramide, 5 mg, Oral, Q8H  pantoprazole, 40 mg, Intravenous, Q12H  rOPINIRole, 0.25 mg, Nasogastric, Nightly  rosuvastatin, 10 mg, Nasogastric, Nightly  senna-docusate sodium, 2 tablet, Nasogastric, BID  sertraline, 150 mg, Nasogastric, Daily  sodium chloride, 10 mL, Intravenous, Q12H  sodium chloride, 10 mL, Intravenous, Q12H  sucralfate, 1 g, Oral, 4x Daily AC & at Bedtime             Active Hospital Problems:  Active Hospital Problems    Diagnosis     **Acute hypoxemic respiratory failure     Respiratory failure     Hyperkalemia     Acute respiratory failure     History of breast cancer     Personal history of CLL (chronic lymphocytic leukemia)     History of cigarette smoking     Fall at home     Rhinovirus infection     CKD (chronic kidney disease) stage 3, GFR 30-59 ml/min     COPD (chronic obstructive pulmonary disease)     LIBBY (obstructive sleep apnea)     Vitamin D deficiency     Vitamin B 12 deficiency     Overactive bladder     Primary osteoarthritis involving multiple joints     Mixed hyperlipidemia     Mixed anxiety and depressive disorder     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  Possible contribution from rhinovirus infection with resultant ARDS.  COPD: Acute on chronic.  Not on any home medications.  Bronchodilators as needed.  Failed extubation and had to be reintubated on 10/25.  Likely due to a combination of COPD, sleep apnea, deconditioning and ARDS.  Respiratory viral panel negative  -Respiratory cultures show heavy growth of Klebsiella  sensitive to cefepime  Infectious is consulted.  Recommended to continue cefepime for 7 days  Extubated now off BiPAP on 10/27, improving.  Wean off oxygen as tolerated.    scheduled AVAPS during the nighttime and as needed during the daytime.     Septic shock due to UTI / E. coli and Klebsiella bacteremia  RVP: +Rhinovirus  BAL cultures were negative.  Sputum cultures from 10/23 with Klebsiella.  ID following, currently on meropenem.  Shock state has resolved.     Acute on chronic heart failure with preserved EF:   Currently well compensated.  Last ECHO showed an EF of 70% with indeterminate LV diastolic function.   On intermittent diuretics, nephrology managing the diuretic regimen.  Monitor Input/Output very closely. Follow daily weights.   Net IO Since Admission: 6,948.95 mL [10/28/23 1112]     Transaminitis with dilated common bile duct  Gallbladder ultrasound with dilated CBD, measuring 14 mm with gall sludge.  GI following.  Liver enzymes normalized.  GI originally consulted for elevated LFTs and now reconsulted for coffee-ground NG output.  Hemoglobin 9.1, 9.2 yesterday.    Coffee-ground emesis with NG placement overnight and daughter at bedside reports coffee-ground emesis during admission as well.  Unfortunately, she is a poor candidate for sedation/EGD at this time as she would likely require intubation and she is currently a DNI.  We will start twice daily PPI, Carafate 4 times daily and low-dose Reglan.  Okay for tube feeds as tolerated per dietitian recommendation.  We will monitor hemoglobin.  Plan EGD for life-threatening GI bleed only at this time.  Monitor hemoglobin and transfuse as needed     Acute toxic / metabolic encephalopathy  CT head on 10/14 with no acute pathology.  Likely due to residual effects of deep sedation for multiple days.  Some ICU delirium as well.  Mentation improved significantly.     Acute kidney injury on CKD stage III  Remains non oliguric.  Baseline creatinine of  1.3.  Likely ATN from septic shock.  Nephrology following, on intermittent diuretics.  Monitor Input/Output very closely.   Net IO Since Admission: 6,948.95 mL [10/28/23 1112]     Diabetes mellitus Type 2, not insulin-dependent : well controlled.   Hold home metformin.    Accu checks every 6 hours and c/w humalog coverage as needed.   Last A1c of 6.5.     Essential Hypertension: well controlled.   Resume home chlorthalidone.  Hold home Norvasc, Cozaar and irbesartan.  Restart medications as needed.     Iron deficiency anemia: On ferrous sulfate.  Some drop in hemoglobin with FOBT positive.  No gross bleeding, no need for emergent EGD/colonoscopy at this time.  Awaiting CBC from 10/28.     Primary hypothyroidism: Chronic and stable. Continue synthroid.    Dyslipidemia: Chronic and stable.  Continue with statins.    Breast cancer, s/p right mastectomy: Resume home anastrozole when able to swallow pills.    Chronic lymphocytic leukemia>   -oncology n consulting. This was first diagnosed at the end of 2022 and she has not needed treatment since the diagnosis. Her condition is complicated at this time by the severity of the present illness. The sudden and marked increase in the white cell count most likely does not represent worsening of the malignant disease but rather is likely connected to the present illness. At this time, other than additional investigations and support I don't believe she needs intervention. One strong possibility is of hemolytic anemia and I will investigate. 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     Falls at home/functional decline-PT OT likely need nursing home discharge.    Restless leg syndrome: Continue with Requip.    Anxiety/depression: Continue Zoloft    Severe obstructive sleep apnea: API of 34.5.  Not sure if she uses any home CPAP.  Refused AVAPS in the  past.      DVT prophylaxis:  Medical and mechanical DVT prophylaxis orders are present.    CODE STATUS:    Medical Intervention Limits: NO intubation (DNI)  Code Status (Patient has no pulse and is not breathing): No CPR (Do Not Attempt to Resuscitate)  Medical Interventions (Patient has pulse or is breathing): Limited Support  Comments: Spoke with daughter and  at the bedside on 10/27/2023      Disposition:  .  PT OT likely needs nursing home on discharge.      Electronically signed by Ra Myers MD, 10/30/23, 11:50 EDT.  [facility] Hospitalist Team

## 2023-10-30 NOTE — PLAN OF CARE
Pt Is A&OX4, pt has went from 7L high flow O2 to 10L high flow O2 this shift. Pt oxygen saturation on 10L is 88-90%. Nursing staff used a oral swab with a small amount of water to moisten the pts mouth and the pt began coughing vigorously. Pt was then told we could not give any ice chips or swabs with water. Oral care with oral care kit was offered to pt to attempt to relief some discomfort, VSS at this time, pt has an NG tube to low suction in place In the left nare, the NG tube has been putting out coffee ground emesis this shift, total of 1450ml. Pt had one moderate size BM this shift that was black in color. Pt c/o slight abdominal discomfort.   Problem: Skin Injury Risk Increased  Goal: Skin Health and Integrity  Outcome: Ongoing, Progressing  Intervention: Optimize Skin Protection  Recent Flowsheet Documentation  Taken 10/30/2023 0400 by Nilsa Mendiola LPN  Pressure Reduction Techniques:   frequent weight shift encouraged   weight shift assistance provided   sit time limited to 2 hours  Pressure Reduction Devices:   positioning supports utilized   pressure-redistributing mattress utilized  Skin Protection: adhesive use limited  Taken 10/30/2023 0000 by Nilsa Mendiola LPN  Pressure Reduction Techniques:   frequent weight shift encouraged   weight shift assistance provided   sit time limited to 2 hours  Pressure Reduction Devices:   pressure-redistributing mattress utilized   positioning supports utilized  Skin Protection: adhesive use limited     Problem: Behavioral Health Comorbidity  Goal: Maintenance of Behavioral Health Symptom Control  Outcome: Ongoing, Progressing  Intervention: Maintain Behavioral Health Symptom Control  Recent Flowsheet Documentation  Taken 10/30/2023 0400 by Nilsa Mendiola LPN  Medication Review/Management: medications reviewed  Taken 10/30/2023 0200 by Nilsa Mendiola LPN  Medication Review/Management: medications reviewed  Taken 10/30/2023 0000 by Nilsa Mendiola LPN  Medication  Review/Management: medications reviewed     Problem: COPD (Chronic Obstructive Pulmonary Disease) Comorbidity  Goal: Maintenance of COPD Symptom Control  Outcome: Ongoing, Progressing  Intervention: Maintain COPD-Symptom Control  Recent Flowsheet Documentation  Taken 10/30/2023 0400 by Nilsa Mendiola LPN  Supportive Measures: active listening utilized  Medication Review/Management: medications reviewed  Taken 10/30/2023 0200 by Nilsa Mendiola LPN  Medication Review/Management: medications reviewed  Taken 10/30/2023 0000 by Nilsa Mendiola LPN  Supportive Measures: active listening utilized  Medication Review/Management: medications reviewed     Problem: Diabetes Comorbidity  Goal: Blood Glucose Level Within Targeted Range  Outcome: Ongoing, Progressing  Intervention: Monitor and Manage Glycemia  Recent Flowsheet Documentation  Taken 10/30/2023 0000 by Nilsa Mendiola LPN  Glycemic Management: blood glucose monitored     Problem: Obstructive Sleep Apnea Risk or Actual Comorbidity Management  Goal: Unobstructed Breathing During Sleep  Outcome: Ongoing, Progressing     Problem: Pain Acute  Goal: Acceptable Pain Control and Functional Ability  Outcome: Ongoing, Progressing  Intervention: Prevent or Manage Pain  Recent Flowsheet Documentation  Taken 10/30/2023 0400 by Nilsa Mendiola LPN  Bowel Elimination Promotion: adequate fluid intake promoted  Medication Review/Management: medications reviewed  Taken 10/30/2023 0200 by Nilsa Mendiola LPN  Medication Review/Management: medications reviewed  Taken 10/30/2023 0000 by Nilsa Mendiola LPN  Medication Review/Management: medications reviewed  Intervention: Develop Pain Management Plan  Recent Flowsheet Documentation  Taken 10/30/2023 0000 by Nilsa Mendiola LPN  Pain Management Interventions:   care clustered   position adjusted   pillow support provided   medication offered but refused  Intervention: Optimize Psychosocial Wellbeing  Recent Flowsheet  Documentation  Taken 10/30/2023 0400 by Nilsa Medniola LPN  Supportive Measures: active listening utilized  Diversional Activities: television  Taken 10/30/2023 0000 by Nilsa Mendiola LPN  Supportive Measures: active listening utilized  Diversional Activities: television     Problem: Gas Exchange Impaired  Goal: Optimal Gas Exchange  Outcome: Ongoing, Progressing     Problem: Adult Inpatient Plan of Care  Goal: Plan of Care Review  Outcome: Ongoing, Progressing  Goal: Patient-Specific Goal (Individualized)  Outcome: Ongoing, Progressing  Goal: Optimal Comfort and Wellbeing  Outcome: Ongoing, Progressing  Intervention: Monitor Pain and Promote Comfort  Recent Flowsheet Documentation  Taken 10/30/2023 0000 by Nilsa Mendiola LPN  Pain Management Interventions:   care clustered   position adjusted   pillow support provided   medication offered but refused  Intervention: Provide Person-Centered Care  Recent Flowsheet Documentation  Taken 10/30/2023 0400 by Nilsa Mendiola LPN  Trust Relationship/Rapport:   care explained   thoughts/feelings acknowledged   reassurance provided   questions encouraged   questions answered  Taken 10/30/2023 0000 by Nilsa Mendiola LPN  Trust Relationship/Rapport:   care explained   thoughts/feelings acknowledged   reassurance provided   questions encouraged   questions answered  Goal: Readiness for Transition of Care  Outcome: Ongoing, Progressing     Problem: Communication Impairment (Mechanical Ventilation, Invasive)  Goal: Effective Communication  Outcome: Ongoing, Progressing     Problem: Device-Related Complication Risk (Mechanical Ventilation, Invasive)  Goal: Optimal Device Function  Outcome: Ongoing, Progressing  Intervention: Optimize Device Care and Function  Recent Flowsheet Documentation  Taken 10/30/2023 0400 by Nilsa Mendiola LPN  Airway Safety Measures:   suction at bedside   oxygen flowmeter at bedside  Taken 10/30/2023 0200 by Nilsa Mendiola LPN  Airway Safety Measures:  oxygen flowmeter at bedside  Taken 10/30/2023 0000 by Nilsa Mendiola LPN  Airway Safety Measures: suction at bedside     Problem: Inability to Wean (Mechanical Ventilation, Invasive)  Goal: Mechanical Ventilation Liberation  Outcome: Ongoing, Progressing  Intervention: Promote Extubation and Mechanical Ventilation Liberation  Recent Flowsheet Documentation  Taken 10/30/2023 0400 by Nisla Mendiola LPN  Environmental Support: calm environment promoted  Medication Review/Management: medications reviewed  Taken 10/30/2023 0200 by Nilsa Mendiola LPN  Medication Review/Management: medications reviewed  Taken 10/30/2023 0000 by Nilsa Mendiola LPN  Environmental Support: calm environment promoted  Medication Review/Management: medications reviewed     Problem: Nutrition Impairment (Mechanical Ventilation, Invasive)  Goal: Optimal Nutrition Delivery  Outcome: Ongoing, Progressing  Intervention: Optimize Nutrition Delivery  Recent Flowsheet Documentation  Taken 10/30/2023 0000 by Nilsa Mendiola LPN  Nutrition Support Management: weight trending reviewed     Problem: Skin and Tissue Injury (Mechanical Ventilation, Invasive)  Goal: Absence of Device-Related Skin and Tissue Injury  Outcome: Ongoing, Progressing  Intervention: Maintain Skin and Tissue Health  Recent Flowsheet Documentation  Taken 10/30/2023 0400 by Nilsa Mendiola LPN  Device Skin Pressure Protection:   absorbent pad utilized/changed   adhesive use limited  Taken 10/30/2023 0000 by Nilsa Mendiola LPN  Device Skin Pressure Protection:   adhesive use limited   absorbent pad utilized/changed     Problem: Ventilator-Induced Lung Injury (Mechanical Ventilation, Invasive)  Goal: Absence of Ventilator-Induced Lung Injury  Outcome: Ongoing, Progressing     Problem: Adjustment to Illness (Sepsis/Septic Shock)  Goal: Optimal Coping  Outcome: Ongoing, Progressing  Intervention: Optimize Psychosocial Adjustment to Illness  Recent Flowsheet Documentation  Taken  10/30/2023 0400 by Nilsa Mendiola LPN  Supportive Measures: active listening utilized  Family/Support System Care: support provided  Taken 10/30/2023 0000 by Nilsa Mendiola LPN  Supportive Measures: active listening utilized  Family/Support System Care: support provided     Problem: Bleeding (Sepsis/Septic Shock)  Goal: Absence of Bleeding  Outcome: Ongoing, Progressing     Problem: Glycemic Control Impaired (Sepsis/Septic Shock)  Goal: Blood Glucose Level Within Desired Range  Outcome: Ongoing, Progressing  Intervention: Optimize Glycemic Control  Recent Flowsheet Documentation  Taken 10/30/2023 0000 by Nilsa Mendiola LPN  Glycemic Management: blood glucose monitored     Problem: Infection Progression (Sepsis/Septic Shock)  Goal: Absence of Infection Signs and Symptoms  Outcome: Ongoing, Progressing  Intervention: Initiate Sepsis Management  Recent Flowsheet Documentation  Taken 10/30/2023 0400 by Nilsa Mendiola LPN  Infection Prevention:   hand hygiene promoted   single patient room provided  Taken 10/30/2023 0200 by Nilsa Mendiola LPN  Infection Prevention:   hand hygiene promoted   single patient room provided  Taken 10/30/2023 0000 by Nilsa Mendiola LPN  Infection Prevention:   hand hygiene promoted   single patient room provided     Problem: Nutrition Impaired (Sepsis/Septic Shock)  Goal: Optimal Nutrition Intake  Outcome: Ongoing, Progressing  Intervention: Promote and Optimize Nutrition Delivery  Recent Flowsheet Documentation  Taken 10/30/2023 0000 by Nilsa Mendiola LPN  Nutrition Support Management: weight trending reviewed     Problem: Aspiration (Enteral Nutrition)  Goal: Absence of Aspiration Signs and Symptoms  Outcome: Ongoing, Progressing  Intervention: Minimize Aspiration Risk  Recent Flowsheet Documentation  Taken 10/30/2023 0000 by Nilsa Mendiola LPN  Enteral Feeding Safety: placement checked     Problem: Device-Related Complication Risk (Enteral Nutrition)  Goal: Safe, Effective Therapy  Delivery  Outcome: Ongoing, Progressing  Intervention: Prevent Feeding-Related Adverse Events  Recent Flowsheet Documentation  Taken 10/30/2023 0000 by Nilsa Mendiola LPN  Enteral Feeding Safety: placement checked     Problem: Feeding Intolerance (Enteral Nutrition)  Goal: Feeding Tolerance  Outcome: Ongoing, Progressing  Intervention: Prevent and Manage Feeding Intolerance  Recent Flowsheet Documentation  Taken 10/30/2023 0000 by Nilsa Mendiola LPN  Nutrition Support Management: weight trending reviewed     Problem: Restraint, Nonviolent  Goal: Absence of Harm or Injury  Outcome: Ongoing, Progressing  Intervention: Implement Least Restrictive Safety Strategies  Recent Flowsheet Documentation  Taken 10/30/2023 0400 by Nilsa Mendiola LPN  Medical Device Protection: tubing secured  Diversional Activities: television  Taken 10/30/2023 0200 by Nilsa Mendiola LPN  Medical Device Protection: tubing secured  Taken 10/30/2023 0000 by Nilsa Mendiola LPN  Medical Device Protection: tubing secured  Diversional Activities: television  Intervention: Protect Dignity, Rights, and Personal Wellbeing  Recent Flowsheet Documentation  Taken 10/30/2023 0400 by Nilsa Mendiola LPN  Trust Relationship/Rapport:   care explained   thoughts/feelings acknowledged   reassurance provided   questions encouraged   questions answered  Taken 10/30/2023 0000 by Nilsa Mendiola LPN  Trust Relationship/Rapport:   care explained   thoughts/feelings acknowledged   reassurance provided   questions encouraged   questions answered  Intervention: Protect Skin and Joint Integrity  Recent Flowsheet Documentation  Taken 10/30/2023 0400 by Nilsa Mendiola LPN  Range of Motion: active ROM (range of motion) encouraged  Taken 10/30/2023 0000 by Nilsa Mendiola LPN  Range of Motion: active ROM (range of motion) encouraged     Problem: Fall Injury Risk  Goal: Absence of Fall and Fall-Related Injury  Outcome: Ongoing, Progressing  Intervention: Identify and  Manage Contributors  Recent Flowsheet Documentation  Taken 10/30/2023 0400 by Nilsa Mendiola LPN  Medication Review/Management: medications reviewed  Taken 10/30/2023 0200 by Nilsa Mendiola LPN  Medication Review/Management: medications reviewed  Taken 10/30/2023 0000 by Nilsa Mendiola LPN  Medication Review/Management: medications reviewed  Intervention: Promote Injury-Free Environment  Recent Flowsheet Documentation  Taken 10/30/2023 0400 by Nilsa Mendiola LPN  Safety Promotion/Fall Prevention:   activity supervised   assistive device/personal items within reach   clutter free environment maintained   fall prevention program maintained   gait belt   nonskid shoes/slippers when out of bed   safety round/check completed  Taken 10/30/2023 0200 by Nilsa Mendiola LPN  Safety Promotion/Fall Prevention:   activity supervised   clutter free environment maintained   assistive device/personal items within reach   fall prevention program maintained   gait belt   nonskid shoes/slippers when out of bed   safety round/check completed  Taken 10/30/2023 0000 by Nilsa Mendiola LPN  Safety Promotion/Fall Prevention:   assistive device/personal items within reach   activity supervised   clutter free environment maintained   fall prevention program maintained   gait belt   nonskid shoes/slippers when out of bed   safety round/check completed   Goal Outcome Evaluation:

## 2023-10-30 NOTE — PLAN OF CARE
Problem: Skin Injury Risk Increased  Goal: Skin Health and Integrity  Outcome: Ongoing, Progressing  Intervention: Optimize Skin Protection  Recent Flowsheet Documentation  Taken 10/30/2023 1230 by Cecelia Gaspar RN  Pressure Reduction Techniques: frequent weight shift encouraged  Pressure Reduction Devices: pressure-redistributing mattress utilized  Skin Protection: adhesive use limited  Taken 10/30/2023 0800 by Cecelia Gaspar RN  Pressure Reduction Techniques:   frequent weight shift encouraged   weight shift assistance provided  Pressure Reduction Devices: pressure-redistributing mattress utilized  Skin Protection:   adhesive use limited   tubing/devices free from skin contact     Problem: COPD (Chronic Obstructive Pulmonary Disease) Comorbidity  Goal: Maintenance of COPD Symptom Control  Outcome: Ongoing, Progressing  Intervention: Maintain COPD-Symptom Control  Recent Flowsheet Documentation  Taken 10/30/2023 1230 by Cecelia Gaspar RN  Supportive Measures: active listening utilized  Taken 10/30/2023 0800 by Cecelia Gaspar RN  Supportive Measures: active listening utilized     Problem: Fall Injury Risk  Goal: Absence of Fall and Fall-Related Injury  Outcome: Ongoing, Progressing  Intervention: Promote Injury-Free Environment  Recent Flowsheet Documentation  Taken 10/30/2023 1400 by Cecelia Gaspar RN  Safety Promotion/Fall Prevention: safety round/check completed  Taken 10/30/2023 1230 by Cecelia Gaspar RN  Safety Promotion/Fall Prevention: safety round/check completed  Taken 10/30/2023 1000 by Cecelia Gaspar RN  Safety Promotion/Fall Prevention: safety round/check completed  Taken 10/30/2023 0800 by Cecelia Gaspar RN  Safety Promotion/Fall Prevention: safety round/check completed   Goal Outcome Evaluation:

## 2023-10-31 ENCOUNTER — APPOINTMENT (OUTPATIENT)
Dept: GENERAL RADIOLOGY | Facility: HOSPITAL | Age: 83
DRG: 207 | End: 2023-10-31
Payer: MEDICARE

## 2023-10-31 ENCOUNTER — INPATIENT HOSPITAL (AMBULATORY)
Dept: URBAN - METROPOLITAN AREA HOSPITAL 84 | Facility: HOSPITAL | Age: 83
End: 2023-10-31
Payer: MEDICARE

## 2023-10-31 DIAGNOSIS — D62 ACUTE POSTHEMORRHAGIC ANEMIA: ICD-10-CM

## 2023-10-31 DIAGNOSIS — K92.0 HEMATEMESIS: ICD-10-CM

## 2023-10-31 DIAGNOSIS — Z85.3 PERSONAL HISTORY OF MALIGNANT NEOPLASM OF BREAST: ICD-10-CM

## 2023-10-31 DIAGNOSIS — E78.1 PURE HYPERGLYCERIDEMIA: ICD-10-CM

## 2023-10-31 DIAGNOSIS — R74.01 ELEVATION OF LEVELS OF LIVER TRANSAMINASE LEVELS: ICD-10-CM

## 2023-10-31 DIAGNOSIS — E11.69 TYPE 2 DIABETES MELLITUS WITH OTHER SPECIFIED COMPLICATION: ICD-10-CM

## 2023-10-31 DIAGNOSIS — D72.820 LYMPHOCYTOSIS (SYMPTOMATIC): ICD-10-CM

## 2023-10-31 LAB
ALBUMIN SERPL-MCNC: 3.3 G/DL (ref 3.5–5.2)
ALBUMIN/GLOB SERPL: 1.2 G/DL
ALP SERPL-CCNC: 157 U/L (ref 39–117)
ALT SERPL W P-5'-P-CCNC: 11 U/L (ref 1–33)
ANION GAP SERPL CALCULATED.3IONS-SCNC: 11 MMOL/L (ref 5–15)
ANISOCYTOSIS BLD QL: ABNORMAL
AST SERPL-CCNC: 20 U/L (ref 1–32)
BILIRUB SERPL-MCNC: 0.2 MG/DL (ref 0–1.2)
BUN SERPL-MCNC: 63 MG/DL (ref 8–23)
BUN/CREAT SERPL: 59.4 (ref 7–25)
CALCIUM SPEC-SCNC: 9.3 MG/DL (ref 8.6–10.5)
CHLORIDE SERPL-SCNC: 99 MMOL/L (ref 98–107)
CO2 SERPL-SCNC: 34 MMOL/L (ref 22–29)
CREAT SERPL-MCNC: 1.06 MG/DL (ref 0.57–1)
DEPRECATED RDW RBC AUTO: 47.3 FL (ref 37–54)
EGFRCR SERPLBLD CKD-EPI 2021: 52.6 ML/MIN/1.73
EOSINOPHIL # BLD MANUAL: 0.78 10*3/MM3 (ref 0–0.4)
EOSINOPHIL NFR BLD MANUAL: 2 % (ref 0.3–6.2)
ERYTHROCYTE [DISTWIDTH] IN BLOOD BY AUTOMATED COUNT: 15.5 % (ref 12.3–15.4)
GLOBULIN UR ELPH-MCNC: 2.8 GM/DL
GLUCOSE BLDC GLUCOMTR-MCNC: 154 MG/DL (ref 70–105)
GLUCOSE BLDC GLUCOMTR-MCNC: 155 MG/DL (ref 70–105)
GLUCOSE BLDC GLUCOMTR-MCNC: 178 MG/DL (ref 70–105)
GLUCOSE BLDC GLUCOMTR-MCNC: 180 MG/DL (ref 70–105)
GLUCOSE BLDC GLUCOMTR-MCNC: 208 MG/DL (ref 70–105)
GLUCOSE SERPL-MCNC: 162 MG/DL (ref 65–99)
HCT VFR BLD AUTO: 27.1 % (ref 34–46.6)
HGB BLD-MCNC: 8.2 G/DL (ref 12–15.9)
HYPOCHROMIA BLD QL: ABNORMAL
LARGE PLATELETS: ABNORMAL
LYMPHOCYTES # BLD MANUAL: 24.96 10*3/MM3 (ref 0.7–3.1)
LYMPHOCYTES NFR BLD MANUAL: 1 % (ref 5–12)
MAGNESIUM SERPL-MCNC: 2.6 MG/DL (ref 1.6–2.4)
MCH RBC QN AUTO: 26.7 PG (ref 26.6–33)
MCHC RBC AUTO-ENTMCNC: 30.2 G/DL (ref 31.5–35.7)
MCV RBC AUTO: 88.5 FL (ref 79–97)
MONOCYTES # BLD: 0.39 10*3/MM3 (ref 0.1–0.9)
NEUTROPHILS # BLD AUTO: 12.87 10*3/MM3 (ref 1.7–7)
NEUTROPHILS NFR BLD MANUAL: 32 % (ref 42.7–76)
NEUTS BAND NFR BLD MANUAL: 1 % (ref 0–5)
PLATELET # BLD AUTO: 525 10*3/MM3 (ref 140–450)
PMV BLD AUTO: 8.9 FL (ref 6–12)
POTASSIUM SERPL-SCNC: 3.9 MMOL/L (ref 3.5–5.2)
PROT SERPL-MCNC: 6.1 G/DL (ref 6–8.5)
RBC # BLD AUTO: 3.06 10*6/MM3 (ref 3.77–5.28)
SCAN SLIDE: NORMAL
SMUDGE CELLS BLD QL SMEAR: ABNORMAL
SODIUM SERPL-SCNC: 144 MMOL/L (ref 136–145)
VARIANT LYMPHS NFR BLD MANUAL: 64 % (ref 19.6–45.3)
WBC NRBC COR # BLD: 39 10*3/MM3 (ref 3.4–10.8)

## 2023-10-31 PROCEDURE — 97530 THERAPEUTIC ACTIVITIES: CPT

## 2023-10-31 PROCEDURE — 97168 OT RE-EVAL EST PLAN CARE: CPT

## 2023-10-31 PROCEDURE — 25010000002 ONDANSETRON PER 1 MG: Performed by: INTERNAL MEDICINE

## 2023-10-31 PROCEDURE — 99232 SBSQ HOSP IP/OBS MODERATE 35: CPT | Performed by: INTERNAL MEDICINE

## 2023-10-31 PROCEDURE — 94660 CPAP INITIATION&MGMT: CPT

## 2023-10-31 PROCEDURE — 82948 REAGENT STRIP/BLOOD GLUCOSE: CPT

## 2023-10-31 PROCEDURE — 25010000002 ENOXAPARIN PER 10 MG: Performed by: INTERNAL MEDICINE

## 2023-10-31 PROCEDURE — 80053 COMPREHEN METABOLIC PANEL: CPT | Performed by: STUDENT IN AN ORGANIZED HEALTH CARE EDUCATION/TRAINING PROGRAM

## 2023-10-31 PROCEDURE — 97535 SELF CARE MNGMENT TRAINING: CPT

## 2023-10-31 PROCEDURE — 85007 BL SMEAR W/DIFF WBC COUNT: CPT | Performed by: NURSE PRACTITIONER

## 2023-10-31 PROCEDURE — 99232 SBSQ HOSP IP/OBS MODERATE 35: CPT | Performed by: NURSE PRACTITIONER

## 2023-10-31 PROCEDURE — 92526 ORAL FUNCTION THERAPY: CPT

## 2023-10-31 PROCEDURE — 97164 PT RE-EVAL EST PLAN CARE: CPT

## 2023-10-31 PROCEDURE — 94799 UNLISTED PULMONARY SVC/PX: CPT

## 2023-10-31 PROCEDURE — 71045 X-RAY EXAM CHEST 1 VIEW: CPT

## 2023-10-31 PROCEDURE — 63710000001 INSULIN LISPRO (HUMAN) PER 5 UNITS: Performed by: INTERNAL MEDICINE

## 2023-10-31 PROCEDURE — 25010000002 MEROPENEM PER 100 MG: Performed by: INTERNAL MEDICINE

## 2023-10-31 PROCEDURE — 85025 COMPLETE CBC W/AUTO DIFF WBC: CPT | Performed by: NURSE PRACTITIONER

## 2023-10-31 PROCEDURE — 83735 ASSAY OF MAGNESIUM: CPT | Performed by: STUDENT IN AN ORGANIZED HEALTH CARE EDUCATION/TRAINING PROGRAM

## 2023-10-31 RX ORDER — LOSARTAN POTASSIUM 25 MG/1
25 TABLET ORAL
Status: DISCONTINUED | OUTPATIENT
Start: 2023-10-31 | End: 2023-11-06 | Stop reason: HOSPADM

## 2023-10-31 RX ADMIN — PANTOPRAZOLE SODIUM 40 MG: 40 INJECTION, POWDER, LYOPHILIZED, FOR SOLUTION INTRAVENOUS at 10:00

## 2023-10-31 RX ADMIN — Medication 10 ML: at 10:01

## 2023-10-31 RX ADMIN — INSULIN LISPRO 4 UNITS: 100 INJECTION, SOLUTION INTRAVENOUS; SUBCUTANEOUS at 17:41

## 2023-10-31 RX ADMIN — ENOXAPARIN SODIUM 40 MG: 40 INJECTION, SOLUTION SUBCUTANEOUS at 16:18

## 2023-10-31 RX ADMIN — MEROPENEM 1000 MG: 1 INJECTION, POWDER, FOR SOLUTION INTRAVENOUS at 06:57

## 2023-10-31 RX ADMIN — SUCRALFATE 1 G: 1 TABLET ORAL at 16:22

## 2023-10-31 RX ADMIN — PANTOPRAZOLE SODIUM 40 MG: 40 INJECTION, POWDER, LYOPHILIZED, FOR SOLUTION INTRAVENOUS at 20:52

## 2023-10-31 RX ADMIN — SENNOSIDES AND DOCUSATE SODIUM 2 TABLET: 8.6; 5 TABLET ORAL at 10:00

## 2023-10-31 RX ADMIN — CHLORTHALIDONE 25 MG: 25 TABLET ORAL at 10:00

## 2023-10-31 RX ADMIN — METOCLOPRAMIDE 5 MG: 5 TABLET ORAL at 17:41

## 2023-10-31 RX ADMIN — AMLODIPINE BESYLATE 10 MG: 5 TABLET ORAL at 10:02

## 2023-10-31 RX ADMIN — ONDANSETRON 4 MG: 2 INJECTION INTRAMUSCULAR; INTRAVENOUS at 07:34

## 2023-10-31 RX ADMIN — SERTRALINE 150 MG: 100 TABLET, FILM COATED ORAL at 10:00

## 2023-10-31 RX ADMIN — INSULIN LISPRO 4 UNITS: 100 INJECTION, SOLUTION INTRAVENOUS; SUBCUTANEOUS at 11:55

## 2023-10-31 RX ADMIN — ROPINIROLE HYDROCHLORIDE 0.25 MG: 1 TABLET, FILM COATED ORAL at 20:53

## 2023-10-31 RX ADMIN — SUCRALFATE 1 G: 1 TABLET ORAL at 10:00

## 2023-10-31 RX ADMIN — LOSARTAN POTASSIUM 25 MG: 25 TABLET, FILM COATED ORAL at 11:55

## 2023-10-31 RX ADMIN — SUCRALFATE 1 G: 1 TABLET ORAL at 20:53

## 2023-10-31 RX ADMIN — LANSOPRAZOLE 30 MG: 30 TABLET, ORALLY DISINTEGRATING ORAL at 06:57

## 2023-10-31 RX ADMIN — Medication 10 ML: at 20:52

## 2023-10-31 RX ADMIN — METOCLOPRAMIDE 5 MG: 5 TABLET ORAL at 10:01

## 2023-10-31 RX ADMIN — LEVOTHYROXINE SODIUM 100 MCG: 0.1 TABLET ORAL at 06:57

## 2023-10-31 RX ADMIN — ROSUVASTATIN 10 MG: 10 TABLET, FILM COATED ORAL at 20:53

## 2023-10-31 NOTE — PROGRESS NOTES
Hematology/Oncology Inpatient Progress Note    PATIENT NAME: Diane Guan  : 1940  MRN: [id number]    CHIEF COMPLAINT: Falls, respiratory failure, leukocytosis with lymphocytosis    HISTORY OF PRESENT ILLNESS:      Diane Guan is a 83 y.o. female who presented to [facility] on 10/10/2023 with complaints of      MsZachery Guan was admitted to the hospital on 10/10/2023. She had fallen, sustaining some trauma to the chest. She complained of weakness of the right lower extremity. In addition she had a respiratory panel positive for rhinovirus. She had been treated in the past for early stage breast cancer and had remained free of evidence of recurrent disease. In addition she was known for chronic lymphocytic leukemia and had maintained significant lymphocytosis for some time but had never needed treatment. At the time of the admission she had maintained a similar white cell count. Today for the first time she had a very significant increase in the total white cell count and platelets. In addition she had significant thrombocytosis.   In spite of all efforts she had very slow improvement. She had continued to have difficulties with weakness and anorexia. She developed pneumonia and had to be intubated; she was finally extubated on 10/27.  On 10/23/2023 she was found to have positive blood cultures for E coli and Klebsiella. At the time of this consult she was still on antibiotics. She also had had evidence of gastrointestinal bleeding; she was diagnosed with iron deficiency and was started on iron but iron studies were not available.      He/She  has a past medical history of Acute bronchitis due to human metapneumovirus (2020), Anxiety disorder, Arthritis, Breast cancer (2019), Chronic lymphocytic leukemia (CLL), B-cell (2019), Depression, Disease of thyroid gland, Diverticulosis of colon (), Dysphagia (2020), Dyspnea on exertion, Fall at home (10/11/2023),  Hemorrhoid, Hiatal hernia (12/2020), Hiatal hernia with GERD, History of breast cancer (10/11/2023), History of cigarette smoking (10/11/2023), History of diabetes mellitus, Hyperlipidemia, Hypertension, Mycobacterium avium complex (02/2019), OAB (overactive bladder), Personal history of CLL (chronic lymphocytic leukemia) (10/11/2023), Skin cancer, and Sleep apnea.     PCP: Asia Queen APRN    Subjective     Patient is less responsive today.  Family at bedside.    ROS:  Review of Systems   Constitutional:  Positive for activity change, appetite change and fatigue. Negative for chills and fever.   HENT:  Negative for congestion, drooling, ear discharge, rhinorrhea, sinus pressure and tinnitus.    Eyes:  Negative for photophobia, pain and discharge.   Respiratory:  Negative for apnea, choking and stridor.    Cardiovascular:  Negative for palpitations.   Gastrointestinal:  Negative for abdominal distention, abdominal pain and anal bleeding.   Endocrine: Negative for polydipsia and polyphagia.   Genitourinary:  Negative for decreased urine volume, flank pain and genital sores.   Musculoskeletal:  Negative for gait problem, neck pain and neck stiffness.   Skin:  Negative for color change, rash and wound.   Neurological:  Positive for weakness. Negative for tremors, seizures, syncope, facial asymmetry and speech difficulty.   Hematological:  Negative for adenopathy.   Psychiatric/Behavioral:  Negative for agitation, confusion, hallucinations and self-injury. The patient is not hyperactive.         MEDICATIONS:    Scheduled Meds:  [Held by provider] amLODIPine, 10 mg, Nasogastric, Q24H  chlorthalidone, 25 mg, Nasogastric, Daily  enoxaparin, 40 mg, Subcutaneous, Daily  Ferrous Sulfate, 300 mg, Nasogastric, Once per day on Mon Wed Fri  insulin lispro, 4-24 Units, Subcutaneous, Q6H  lansoprazole, 30 mg, Nasogastric, Q AM  levothyroxine, 100 mcg, Nasogastric, Q AM  meropenem, 1,000 mg, Intravenous, Q12H  metoclopramide,  "5 mg, Nasogastric, Q8H  pantoprazole, 40 mg, Intravenous, Q12H  rOPINIRole, 0.25 mg, Nasogastric, Nightly  rosuvastatin, 10 mg, Nasogastric, Nightly  senna-docusate sodium, 2 tablet, Nasogastric, BID  sertraline, 150 mg, Nasogastric, Daily  sodium chloride, 10 mL, Intravenous, Q12H  sodium chloride, 10 mL, Intravenous, Q12H  sucralfate, 1 g, Nasogastric, 4x Daily AC & at Bedtime       Continuous Infusions:      PRN Meds:    acetaminophen    cyclobenzaprine    dextrose    dextrose    dextrose    glucagon (human recombinant)    hydrALAZINE    HYDROmorphone    ipratropium-albuterol    lidocaine    LORazepam    nitroglycerin    ondansetron    oxyCODONE    polyethylene glycol    [COMPLETED] Insert Peripheral IV **AND** sodium chloride    sodium chloride    sodium chloride    sodium chloride    sodium chloride     ALLERGIES:  No Known Allergies    Objective    VITALS:   /58 (BP Location: Left arm, Patient Position: Lying)   Pulse 69   Temp 98.3 °F (36.8 °C) (Oral)   Resp 21   Ht 160 cm (63\")   Wt 77 kg (169 lb 12.1 oz)   SpO2 (!) 88%   BMI 30.07 kg/m²     PHYSICAL EXAM: (performed by MD)  Physical Exam  Constitutional:       General: She is in acute distress.      Appearance: She is ill-appearing.   HENT:      Head:      Comments: Ng tube in palce     Mouth/Throat:      Mouth: Mucous membranes are moist.      Pharynx: Oropharynx is clear.   Eyes:      Extraocular Movements: Extraocular movements intact.      Pupils: Pupils are equal, round, and reactive to light.   Pulmonary:      Effort: Pulmonary effort is normal.   Neurological:      General: No focal deficit present.      Mental Status: Mental status is at baseline.      I have reexamined the patient and the results are consistent with the previously documented exam. Ling Bolden MD       RECENT LABS:  Lab Results (last 24 hours)       Procedure Component Value Units Date/Time    POC Glucose Once [[id number]]  (Abnormal) Collected: 10/31/23 0502 "    Specimen: Blood Updated: 10/31/23 0503     Glucose 178 mg/dL      Comment: Serial Number: [id number]:  [id number]       POC Glucose Once [[id number]]  (Abnormal) Collected: 10/31/23 0037    Specimen: Blood Updated: 10/31/23 0039     Glucose 154 mg/dL      Comment: Serial Number: [id number]:  [id number]       POC Glucose Once [[id number]]  (Abnormal) Collected: 10/30/23 1823    Specimen: Blood Updated: 10/30/23 1825     Glucose 180 mg/dL      Comment: Serial Number: [id number]:  [id number]       Magnesium [[id number]]  (Abnormal) Collected: 10/30/23 1212    Specimen: Blood Updated: 10/30/23 1245     Magnesium 2.5 mg/dL     Comprehensive Metabolic Panel [[id number]]  (Abnormal) Collected: 10/30/23 1212    Specimen: Blood Updated: 10/30/23 1245     Glucose 154 mg/dL      BUN 69 mg/dL      Creatinine 1.19 mg/dL      Sodium 148 mmol/L      Potassium 4.0 mmol/L      Chloride 103 mmol/L      CO2 28.0 mmol/L      Calcium 9.6 mg/dL      Total Protein 6.9 g/dL      Albumin 3.5 g/dL      ALT (SGPT) 16 U/L      AST (SGOT) 20 U/L      Alkaline Phosphatase 148 U/L      Total Bilirubin 0.2 mg/dL      Globulin 3.4 gm/dL      A/G Ratio 1.0 g/dL      BUN/Creatinine Ratio 58.0     Anion Gap 17.0 mmol/L      eGFR 45.7 mL/min/1.73     Narrative:      GFR Normal >60  Chronic Kidney Disease <60  Kidney Failure <15    The GFR formula is only valid for adults with stable renal function between ages 18 and 70.    POC Glucose Once [[id number]]  (Abnormal) Collected: 10/30/23 1215    Specimen: Blood Updated: 10/30/23 1217     Glucose 149 mg/dL      Comment: Serial Number: [id number]:  [id number]               PENDING RESULTS:     IMAGING REVIEWED:  XR Chest 1 View    Result Date: 10/31/2023  Impression: Resolution of right basilar atelectasis. Continued partial atelectasis of the left lower lobe. Electronically Signed: Rosa Melendez MD  10/31/2023 7:15 AM EDT  Workstation ID: [id number]    XR Abdomen KUB    Result Date:  10/30/2023  Impression: Gastric suction tube terminates in the stomach. Electronically Signed: Miguel Trent MD  10/30/2023 11:14 PM EDT  Workstation ID: [id number]    XR Chest 1 View    Result Date: 10/30/2023  Impression: 1. Placement of esophagogastric tube with tip below the diaphragm. 2. Mild bibasilar airspace disease likely atelectasis with small bilateral pleural effusions, unchanged. Electronically Signed: Pavan Amezquita MD  10/30/2023 7:08 AM EDT  Workstation ID: [id number]    XR Abdomen KUB    Result Date: 10/29/2023  Impression: 1. Esophagogastric tube tip overlies the distal stomach. 2. Nonspecific, nonobstructive bowel gas pattern. Electronically Signed: Pavan Amezquita MD  10/29/2023 5:11 PM EDT  Workstation ID: [id number]    XR Abdomen KUB    Result Date: 10/29/2023  Impression: Esophagogastric tube nonvisualized overlying the lower chest or upper abdomen. Recommend repositioning and follow-up KUB to evaluate for position. Electronically Signed: Pavan Amezquita MD  10/29/2023 3:28 PM EDT  Workstation ID: [id number]     Assessment & Plan   ASSESSMENT:  Chronic lymphocytic leukemia: First diagnosed at the end of 2022 and she has not needed treatment since the diagnosis.  Her condition is complicated at this time but the severity of her present illness.  The sudden marked increase in the white cell count is most likely not representative of the malignant disease but rather connected to the present illness. 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  From IgG is pending  Normocytic anemia: Has experienced coffee-ground emesis and gastroenterology is following but patient is a poor candidate for sedation/EGD.  They plan for EGD only for life-threatening GI bleed at this time.Haptoglobin was elevated indicating low likelihood of hemolytic anemia.  Continue to monitor her hemoglobin so far remained  stable  History of stage II right breast cancer status post right mastectomy.  ER positive, VT positive, HER2/juan luis negative.  She remains on Arimidex  Nutrition via tube feeds    PLAN:  Monitor CBC and transfuse for hemoglobin less than 7.0 g/dL  Continue IV antibiotics and supportive care  Serum IgG today  Leukocytosis is beginning to decrease  Antibiotics per infectious disease.  IV Merrem completed 10/31/2023              Electronically signed by Ling Bolden MD, 11/01/23, 8:23 AM EDT.

## 2023-10-31 NOTE — PLAN OF CARE
Goal Outcome Evaluation:  Plan of Care Reviewed With: patient, grandchild(marco)           Outcome Evaluation: Completed re-eval this date as pt has not been seen by PT since 10/15.  Pt has been in ICU since initial eval requiring intubation x 2.  Pt is now extubated and on PCU.  Pt is currently on HFNC at 15L.  Pt is awake and alert, eager to get OOB and wondering if she is allowed to use bedside commode.  Pt tranfsers to EOB with min A x2, sat EOB x 10 minutes while SLP assessed swallow.  Pt stood with min A and transfers to chair with min A x2.  Sats remain in mid 90s with activity, lowest sats dropped was briefly to 88%.  Will follow pt 5x/week for progression of mobility as tolerated.  Recommend SNF for rehab at d/c.      Anticipated Discharge Disposition (PT): skilled nursing facility

## 2023-10-31 NOTE — PLAN OF CARE
Goal Outcome Evaluation:  Plan of Care Reviewed With: patient        Progress: no change  Outcome Evaluation: Reevaluationg completed this date secondary to complicated hospital stay, intubation x 2 and patient recently transfered from ICU. Pt. on 15L O2 this date. Pt. is A and O x 3, completes bed mobility and SPS transfer EOB to armchair w/ min A x 2 and rolling walker support. Pt. w/ limited standing tolerance desats 87-88% and recovers > 90% w/ PBL technique and education. Pt. provided max A for all LB ADLs this date. Anticipate SNF placement at d/c to address aforementioned deficits, will follow up w/ pt. 1-3x per week at [facility] to assess and monitor progress.      Anticipated Discharge Disposition (OT): skilled nursing facility

## 2023-10-31 NOTE — PROGRESS NOTES
Nutrition Services    Patient Name: Diane Guan  YOB: 1940  MRN: [id number]  Admission date: 10/10/2023    EN Prescription: Change tube feeding to Nutren 1.5.  Start with rate of 35mL/hour, increasing by 10mL q 2 hours to goal of 55mL/hour.  Water flush 85mL/hour.    PPE Documentation        PPE Worn By Provider gloves   PPE Worn By Patient  None      CLINICAL NUTRITION ASSESSMENT      Reason for Assessment Follow up protocol   10/17: Consult for tube feeding, LOS x 7 days      H&P      Past Medical History:   Diagnosis Date    Acute bronchitis due to human metapneumovirus 02/08/2020    Anxiety disorder     Arthritis     Breast cancer 02/2019    Invasive ductal carcinoma    Chronic lymphocytic leukemia (CLL), B-cell 01/2019    Depression     Disease of thyroid gland     Diverticulosis of colon 2018    Identified on colonoscopy    Dysphagia 12/2020    Dyspnea on exertion     Fall at home 10/11/2023    Hemorrhoid     Hiatal hernia 12/2020    Hiatal hernia with GERD     large hiatal hernia with high-grade reflux on barium swallow; s/p laparoscopic fundoplication with gastropexy    History of breast cancer 10/11/2023    History of cigarette smoking 10/11/2023    History of diabetes mellitus     no meds now    Hyperlipidemia     Hypertension     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    aspiration pneumonia; completed abx therapy    OAB (overactive bladder)     Personal history of CLL (chronic lymphocytic leukemia) 10/11/2023    Skin cancer     Sleep apnea     daughter states pt does not have and doesn't have machine       Past Surgical History:   Procedure Laterality Date    BLADDER SURGERY      BREAST BIOPSY      BRONCHOSCOPY N/A 09/13/2019    Procedure: BRONCHOSCOPY with bronchial washing;  Surgeon: Marnie Frankel MD;  Location: [facility] ENDOSCOPY;  Service: Pulmonary    BRONCHOSCOPY Bilateral 10/18/2023    Procedure: BRONCHOSCOPY AT BEDSIDE;  Surgeon: Vinicius Heredia DO;  Location: [facility]  ENDOSCOPY;  Service: Pulmonary;  Laterality: Bilateral;    COLONOSCOPY  07/19/2018    severe diverticulosis    CYST REMOVAL      Removed a fatty cyst off of her back    ENDOSCOPY N/A 11/23/2020    Procedure: ESOPHAGOGASTRODUODENOSCOPY with dilatation (Bougie # 48, 50, 52, 54, 56, 58);  Surgeon: Den Plummer DO;  Location: [facility] ENDOSCOPY;  Service: General;  Laterality: N/A;  Post: large hiatal hernia, joshua's ulcers    HIATAL HERNIA REPAIR N/A 12/30/2020    Procedure: Laparoscopic hiatal hernia repair with gastropexy;  Surgeon: Den Plummer DO;  Location: [facility] MAIN OR;  Service: General;  Laterality: N/A;    MASTECTOMY Right 02/04/2019    Invasive ductal carcinoma    TUBAL ABDOMINAL LIGATION          Current Problems   Acute respiratory failure  - intubated 10/15  - extubated 10/27    Septic shock due to UTI and bacteremia   - ID following   - shock has resolved     Acute on chronic CHF  - nephrology managing diuretics    Transaminitis with dilated common bile duct  - GI following   - had coffee-ground emesis earlier in admission, now resolved  - still some nausea 10/31 but not vomiting      LEXUS  - Nephrology following    Other chronic medical problems: T2DM, HTN, iron deficiency anemia, hypothyroidism, dyslipidemia, breast CA, CLL, RLS, anxiety, depression       Encounter Information        Trending Narrative     10/31: Pt assessed for follow up. Since prior assessment, pt has been extubated and has moved out of critical care environment. Continues on TF via NG access. Pt does not have hearing aids in today, so granddaughter at bedside provided majority of information. Per granddaughter, pt reported some nausea today but has not had vomiting. NFPE repeated, and pt potentially with some mild wasting at the calf, but other areas seem similar to prior exam -- due to pt positioning and clinical condition, did not repeat all sites on today's visit. Observed Peptamen AF infusing at goal rate 65mL/hour at  "visit. Granddaughter confirms pt was on an oral diet prior to admission and had no food avoidances.    10/24: Since last RD review, remains on vent support.  Continues on tube feeding.  Patient discussed in AM rounds.  On precedex.  RD visited patient at bedside.  NFPE completed and not consistent with nutrition diagnosis of malnutrition at this time using AND/ASPEN criteria.      10/17: Admitted on 10/14 after a fall with right rib and right sided back pain.  Code event 10/15 resulting in intubation.  Patient discussed in AM rounds on 10/17 and MD wishes to begin tube feeding via OG tube already in place.  NFPE completed and not consistent with nutrition diagnosis of malnutrition at this time using AND/ASPEN criteria.  Noted with temporal muscle loss - likely age related.         Anthropometrics        Current Height, Weight Height: 160 cm (63\")  Weight: 77 kg (169 lb 12.1 oz) (10/31/23 0441)       Usual Body Weight (UBW) Unable to obtain from patient        Trending Weight Hx     This admission: 10/31: 0.7% loss since prior assessment, though I/O reflects volume increase -- could be masking loss due to fluid gain. Will continue to monitor this.  10/24: 170#, 3.9% weight loss since last RD review, +4.7L per I/Os  10/17: 177#             PTA: 4.3% weight loss x 11 months     Wt Readings from Last 30 Encounters:   10/31/23 0441 77 kg (169 lb 12.1 oz)   10/29/23 0535 78.9 kg (173 lb 15.1 oz)   10/28/23 2126 78.9 kg (173 lb 15.1 oz)   10/26/23 0500 78.3 kg (172 lb 9.9 oz)   10/24/23 0300 77.5 kg (170 lb 13.7 oz)   10/23/23 0400 80.2 kg (176 lb 12.9 oz)   10/22/23 0456 83.7 kg (184 lb 8.4 oz)   10/20/23 1021 83.9 kg (185 lb)   10/20/23 0600 84.1 kg (185 lb 6.5 oz)   10/18/23 0550 83.2 kg (183 lb 6.8 oz)   10/15/23 0952 80.3 kg (177 lb)   10/14/23 0040 80.7 kg (177 lb 14.6 oz)   10/10/23 2332 79 kg (174 lb 2.6 oz)   10/10/23 1252 78.5 kg (173 lb)   09/15/23 0917 80.7 kg (178 lb)   05/15/23 0918 80.3 kg (177 lb) "   05/05/23 0852 80.3 kg (177 lb)   01/16/23 0919 82.6 kg (182 lb)   11/04/22 0806 83.9 kg (185 lb)   09/21/22 1520 82.6 kg (182 lb)   08/31/22 0832 85.3 kg (188 lb)   08/05/22 1559 85.5 kg (188 lb 9.6 oz)   03/15/22 0927 83 kg (183 lb)   10/25/21 1229 86.6 kg (191 lb)   07/22/21 0855 82.1 kg (181 lb)   07/08/21 0600 89.5 kg (197 lb 5 oz)   07/07/21 0600 88.7 kg (195 lb 8.8 oz)   07/05/21 2300 88.5 kg (195 lb 1.7 oz)   07/04/21 0600 85.3 kg (188 lb 0.8 oz)   07/02/21 1617 81.6 kg (180 lb)   03/26/21 0823 83.5 kg (184 lb)   01/20/21 1002 80.7 kg (178 lb)   01/13/21 0536 86.2 kg (190 lb 0.6 oz)   01/12/21 0500 84.9 kg (187 lb 2.7 oz)   01/11/21 0500 81.8 kg (180 lb 5.4 oz)   01/10/21 0500 82.8 kg (182 lb 8.7 oz)   01/09/21 0500 81.9 kg (180 lb 8.9 oz)   01/08/21 0551 85.4 kg (188 lb 4.4 oz)   01/07/21 0529 83.9 kg (184 lb 15.5 oz)   01/06/21 1414 82.1 kg (181 lb)   01/06/21 0536 82.5 kg (181 lb 14.1 oz)   01/05/21 0500 83.9 kg (184 lb 15.5 oz)   01/04/21 0506 83.9 kg (184 lb 15.5 oz)   01/03/21 0525 87.3 kg (192 lb 7.4 oz)   12/31/20 0534 83.6 kg (184 lb 4.9 oz)   12/16/20 1444 81.6 kg (180 lb)   12/03/20 1423 85.7 kg (189 lb)   11/23/20 0646 85.6 kg (188 lb 11.4 oz)   11/16/20 1200 85.3 kg (188 lb)   11/05/20 1427 85.3 kg (188 lb)   08/18/20 0949 85.3 kg (188 lb)   05/15/20 0818 83.5 kg (184 lb)   02/14/20 1042 81.6 kg (179 lb 12.8 oz)   02/12/20 0523 83 kg (183 lb)   02/11/20 0451 83.3 kg (183 lb 9.6 oz)   02/08/20 0330 80.1 kg (176 lb 9.4 oz)   02/07/20 0347 80.7 kg (177 lb 14.6 oz)   02/06/20 1248 81.5 kg (179 lb 10.8 oz)   02/04/20 1505 83 kg (183 lb)   01/03/20 1110 83.7 kg (184 lb 9.6 oz)   09/13/19 0730 80.5 kg (177 lb 7.5 oz)   09/12/19 0908 77.1 kg (170 lb)   06/25/19 1116 75.8 kg (167 lb)   04/04/19 0857 73 kg (161 lb)   01/24/19 1502 77.8 kg (171 lb 9.6 oz)   12/27/18 1118 77.6 kg (171 lb)      BMI kg/m2 Body mass index is 30.07 kg/m².       Labs        Pertinent Labs    Results from last 7 days   Lab Units  10/31/23  0844 10/30/23  1212 10/29/23  0121   SODIUM mmol/L 144 148* 142   POTASSIUM mmol/L 3.9 4.0 4.1   CHLORIDE mmol/L 99 103 102   CO2 mmol/L 34.0* 28.0 26.0   BUN mg/dL 63* 69* 73*   CREATININE mg/dL 1.06* 1.19* 1.15*   CALCIUM mg/dL 9.3 9.6 9.3   BILIRUBIN mg/dL 0.2 0.2 0.2   ALK PHOS U/L 157* 148* 137*   ALT (SGPT) U/L 11 16 14   AST (SGOT) U/L 20 20 22   GLUCOSE mg/dL 162* 154* 176*     Results from last 7 days   Lab Units 10/31/23  0844 10/30/23  1212 10/29/23  0324 10/29/23  0121 10/28/23  0625 10/27/23  0428   MAGNESIUM mg/dL 2.6* 2.5*  --  2.6*   < > 2.5*   HEMOGLOBIN g/dL 8.2*  --    < >  --    < > 7.6*   HEMATOCRIT % 27.1*  --    < >  --    < > 24.9*   TRIGLYCERIDES mg/dL  --   --   --   --   --  407*    < > = values in this interval not displayed.     Lab Results   Component Value Date    HGBA1C 6.50 (H) 10/13/2023        Medications    Scheduled Medications amLODIPine, 10 mg, Nasogastric, Q24H  chlorthalidone, 25 mg, Nasogastric, Daily  enoxaparin, 40 mg, Subcutaneous, Daily  Ferrous Sulfate, 300 mg, Nasogastric, Once per day on Mon Wed Fri  insulin lispro, 4-24 Units, Subcutaneous, Q6H  lansoprazole, 30 mg, Nasogastric, Q AM  levothyroxine, 100 mcg, Nasogastric, Q AM  losartan, 25 mg, Oral, Q24H  metoclopramide, 5 mg, Nasogastric, Q8H  pantoprazole, 40 mg, Intravenous, Q12H  rOPINIRole, 0.25 mg, Nasogastric, Nightly  rosuvastatin, 10 mg, Nasogastric, Nightly  senna-docusate sodium, 2 tablet, Nasogastric, BID  sertraline, 150 mg, Nasogastric, Daily  sodium chloride, 10 mL, Intravenous, Q12H  sodium chloride, 10 mL, Intravenous, Q12H  sucralfate, 1 g, Nasogastric, 4x Daily AC & at Bedtime        Infusions        PRN Medications   acetaminophen    cyclobenzaprine    dextrose    dextrose    dextrose    glucagon (human recombinant)    hydrALAZINE    HYDROmorphone    ipratropium-albuterol    lidocaine    LORazepam    nitroglycerin    ondansetron    oxyCODONE    polyethylene glycol    [COMPLETED] Insert  Peripheral IV **AND** sodium chloride    sodium chloride    sodium chloride    sodium chloride    sodium chloride     Physical Findings        Trending Physical   Appearance, NFPE 10/31: NFPE completed and not consistent with nutrition diagnosis of malnutrition at this time using AND/ASPEN criteria - may have slight/mild calf wasting but other sites remain adequately nourished; will continue to follow up    10/24: Agree with previous assessment    10/17: NFPE completed and not consistent with nutrition diagnosis of malnutrition at this time using AND/ASPEN criteria.  Noted with temporal muscle loss likely age related.      --  Edema  No edema documented      Bowel Function Last documented BM 10/30 (x 1 day ago)     Tubes NG tube placed 10/29     Chewing/Swallowing Prior to admission was taking PO; just recently extubated; will continue to monitor      Skin Intact        Estimated/Assessed Needs    Estimated 10/31/23   Energy Requirements    EST Needs, Method, Wt used 6854-5833 kcal/day (22-25 kcal/kg actual wt 77kg)       Protein Requirements    EST Needs, Method, Wt used 68 grams/day (1.3 g/kg of IBW 54.5 kg)       Fluid Requirements     Estimated Needs (mL/day) 1 mL/kcal, will monitor hydration status      Current Nutrition Orders & Evaluation of Intake       Oral Nutrition     Food Allergies NKFA   Current PO Diet NPO Diet NPO Type: Strict NPO   Supplement None ordered    PO Evaluation     Trending % PO Intake 10/24: NPO  10/17: NPO     Enteral Nutrition    Enteral Route OG tube    Order, Modulars, Flushes Peptamen AF at 60 mL/hour + 75 mL/hour water flush    Residual/Tolerance WNL   TF Observation  Observed as above at bedside        Parenteral Nutrition     TPN Route    Total # Days on TPN    TPN Order, Lipid Details    MVI & Trace Element Freq    TPN Observation         Nutrition Course Details    PO Diets: Heart Healthy, Low Potassium 10/15  NPO 10/15 to current 10/31   Nutrition Support: Tube feeding began  10/17, continues 10/31     Nutritional Risk Screening        NRS-2002 Score          Nutrition Diagnosis         Nutrition Dx Problem 1 Inadequate oral intake R/t inability to take diet PO; as evidenced by ongoing need for enteral nutrition support.      Nutrition Dx Problem 2        Intervention Goal         Intervention Goal(s) Pt tolerates new formula well      Nutrition Intervention        RD Action Change tube feeding formula to non-elemental product; monitor tolerance      Nutrition Prescription          Diet Prescription NPO   Supplement Prescription      Enteral Prescription Initial Goal to establish tolerance:     Nutren 1.5 at 35mL/hr + 85mL/hr water flush      End Goal:  Nutren 1.5 at 55 mL/hr + 85mL/hr water flush      Calories  1815 kcals (100% within range)    Protein  82 g (121%) - provides about 1.5g/kg IBW -- not excessive based on current illness; will monitor tolerance and labs    Free water  920 mL    Flushes  Will adjust as clinically indicated      The above end goal rate is for 22 hrs/day to assume interruptions for ADLs. Water flushes adjusted based on clinical picture + Rx flushes/IV fluids        TPN Prescription      Monitor/Evaluation        Monitor Per protocol, I&O, Pertinent labs, EN delivery/tolerance, Weight, Skin status, GI status, Symptoms       Electronically signed by:  Kita Chino RD  10/31/23 13:10 EDT

## 2023-10-31 NOTE — THERAPY RE-EVALUATION
Acute Care - Speech Language Pathology   Swallow Re-Evaluation  [place]     Patient Name: Diane Guan  : 1940  MRN: [id number]  Today's Date: 10/31/2023               Admit Date: 10/10/2023    Visit Dx:     ICD-10-CM ICD-9-CM   1. Hypoxemia  R09.02 799.02   2. Chronic respiratory failure with hypoxia  J96.11 518.83     799.02   3. Rhinovirus infection  B34.8 079.3   4. Dyspnea, unspecified type  R06.00 786.09   5. Cough, unspecified type  R05.9 786.2   6. Rib pain on right side  R07.81 786.50   7. Right low back pain, unspecified chronicity, unspecified whether sciatica present  M54.50 724.2   8. Acute respiratory failure with hypoxia  J96.01 518.81   9. Pneumonia of left lower lobe due to infectious organism  J18.9 486   10. Mucus plugging of bronchi  T17.500A 519.19   11. Acute hypoxemic respiratory failure  J96.01 518.81   12. 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    Acquired hypothyroidism    Type 2 diabetes mellitus with stage 3b chronic kidney disease, without long-term current use of insulin    Insomnia    Postmenopausal status    Shoulder pain    Overactive bladder    Vitamin B 12 deficiency    Seasonal allergies    Absolute anemia    Vitamin D deficiency    LIBBY (obstructive sleep apnea)    Nocturnal hypoxia    Acute respiratory failure with hypoxia and hypercapnia    Abdominal pain    COPD (chronic obstructive pulmonary disease)    CKD (chronic kidney disease) stage 3, GFR 30-59 ml/min    Sepsis due to pneumonia    Acute on chronic respiratory failure with hypoxemia     Class 1 obesity due to excess calories with serious comorbidity and body mass index (BMI) of 32.0 to 32.9 in adult    Acute hypoxemic respiratory failure    History of breast cancer    Personal history of CLL (chronic lymphocytic leukemia)    History of cigarette smoking    Fall at home    Rhinovirus infection    Acute respiratory failure    Hyperkalemia    Respiratory failure     Past Medical History:   Diagnosis Date    Acute bronchitis due to human metapneumovirus 02/08/2020    Anxiety disorder     Arthritis     Breast cancer 02/2019    Invasive ductal carcinoma    Chronic lymphocytic leukemia (CLL), B-cell 01/2019    Depression     Disease of thyroid gland     Diverticulosis of colon 2018    Identified on colonoscopy    Dysphagia 12/2020    Dyspnea on exertion     Fall at home 10/11/2023    Hemorrhoid     Hiatal hernia 12/2020    Hiatal hernia with GERD     large hiatal hernia with high-grade reflux on barium swallow; s/p laparoscopic fundoplication with gastropexy    History of breast cancer 10/11/2023    History of cigarette smoking 10/11/2023    History of diabetes mellitus     no meds now    Hyperlipidemia     Hypertension     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    aspiration pneumonia; completed abx therapy    OAB (overactive bladder)     Personal history of CLL (chronic lymphocytic leukemia) 10/11/2023    Skin cancer     Sleep apnea     daughter states pt does not have and doesn't have machine     Past Surgical History:   Procedure Laterality Date    BLADDER SURGERY      BREAST BIOPSY      BRONCHOSCOPY N/A 09/13/2019    Procedure: BRONCHOSCOPY with bronchial washing;  Surgeon: Marnie Frankel MD;  Location: [facility] ENDOSCOPY;  Service: Pulmonary    BRONCHOSCOPY Bilateral 10/18/2023    Procedure: BRONCHOSCOPY AT BEDSIDE;  Surgeon: Vinicius Heredia DO;  Location: [facility] ENDOSCOPY;  Service: Pulmonary;  Laterality: Bilateral;    COLONOSCOPY  07/19/2018    severe diverticulosis    CYST REMOVAL      Removed a  fatty cyst off of her back    ENDOSCOPY N/A 11/23/2020    Procedure: ESOPHAGOGASTRODUODENOSCOPY with dilatation (Bougie # 48, 50, 52, 54, 56, 58);  Surgeon: Den Plummer DO;  Location: [facility] ENDOSCOPY;  Service: General;  Laterality: N/A;  Post: large hiatal hernia, joshua's ulcers    HIATAL HERNIA REPAIR N/A 12/30/2020    Procedure: Laparoscopic hiatal hernia repair with gastropexy;  Surgeon: Den Plummer DO;  Location: [facility] MAIN OR;  Service: General;  Laterality: N/A;    MASTECTOMY Right 02/04/2019    Invasive ductal carcinoma    TUBAL ABDOMINAL LIGATION         SLP Recommendation and Plan  SLP Swallowing Diagnosis: (P) suspected pharyngeal dysphagia (10/31/23 1327)  SLP Diet Recommendation: (P) NPO, ice chips between meals after oral care, with supervision (10/31/23 1327)  Recommended Precautions and Strategies: (P) general aspiration precautions, 1:1 supervision, other (see comments) (modified fwp (see note)) (10/31/23 1327)  SLP Rec. for Method of Medication Administration: (P) meds via alternate route (10/31/23 1327)     Monitor for Signs of Aspiration: (P) yes, notify SLP if any concerns (10/31/23 1327)  Recommended Diagnostics: (P) reassess via clinical swallow evaluation, reassess via VFSS ([facility]) (10/31/23 1327)  Swallow Criteria for Skilled Therapeutic Interventions Met: (P) demonstrates skilled criteria (10/31/23 1327)     Rehab Potential/Prognosis, Swallowing: (P) re-evaluate goals as necessary (10/31/23 1327)  Therapy Frequency (Swallow): (P) PRN (10/31/23 1327)  Predicted Duration Therapy Intervention (Days): (P) until discharge (10/31/23 1327)  Oral Care Recommendations: (P) Oral Care BID/PRN (10/31/23 1327)                                      Oral Care Recommendations: (P) Oral Care BID/PRN (10/31/23 1327)           SWALLOW EVALUATION (last 72 hours)       SLP Adult Swallow Evaluation       Row Name 10/31/23 1327       Rehab Evaluation    Document Type re-evaluation (P)   -    Patient  "Effort adequate (P)   -KP    Symptoms Noted During/After Treatment none (P)   -KP       General Information    Patient Profile Reviewed yes (P)   -KP    Pertinent History Of Current Problem Per H&P: \"83 y.o. female with PMH of diabetes, hypertension, breast cancer presented to the hospital after a fall and was admitted with a principal diagnosis of Acute hypoxemic respiratory failure.  On admission, patient required 2 L nasal cannula and subsequently had worsening oxygenation with hypercapnic respiratory failure.  Failed BiPAP and subsequently was intubated on 10/15.  Initially treated with broad-spectrum antibiotics and ID was consulted.  RVP positive for rhinovirus, sputum cultures were negative.  Treated with diuretics for fluid overload.  Developed profound hypoxia and needed deep sedation.  Had bronch on 10/18.  Oxygenation improved with aggressive diuretics. Subsequently, extubated on 10/25 but developed hypoxia afterwards, failed BiPAP and had to be reintubated. \"      Pt on the vent from 10/15 - 10/25; 10/25 - 10/27.           Current Method of Nutrition NPO;other (see comments) (P)   modified fwp w/ ice chips w/ supervision  -    Plans/Goals Discussed with patient and family (P)   -KP    Barriers to Rehab medically complex (P)   -KP       General Eating/Swallowing Observations    Respiratory Support Currently in Use nasal cannula (P)   -KP    O2 Liters other (see comments) (P)   15L  -KP    Eating/Swallowing Skills --    Positioning During Eating upright 90 degree (P)   -KP    Utensils Used spoon (P)   -KP    Consistencies Trialed ice chips;thin liquids (P)   -KP       Clinical Swallow Eval    Oral Prep Phase WFL (P)   -KP    Oral Transit WFL (P)   -KP    Pharyngeal Phase suspected pharyngeal impairment (P)   -KP    Clinical Swallow Evaluation Summary Patient seen this date for a clinical bedside re-evaluation in the setting of post extubation dysphagia following 2 intubations totaling 12 days. Pt's " granddaughter at bedside and reports pt has concerns of mouth being dry. Session conducted in collaboration with PT & OT, who assisted pt to EOB where she remained positioned for all PO trials. Pt switched from 15L nonrebreather to 15L HFNC just prior to exam. 02 sats just below 90 prior to PO trials, with movement to EOB. Sats increased to 90's with encouragement of deep nasal inspiration prior to PO trials.    Post extubation dysphonia remains pervasive as pt is primarily aphonic. She does achieve audible hoarse/strained voice with coughing events only.     Trials of ice chips x6 and water by teaspoon x1 observed. On first two trials of ice chips patient showed no overt s/s of aspiration. Water by tsp was presented and pt demonstrated adequate labial seal and pull from spoon with no anterior loss noted. Pt had cough response following trial by tsp. Patient was given four additional trials of ice chips and elicited a cough response 2/4.  Patients O2 sats noted to drop to 91 and 90 on two occasions, as correlated with coughing events.  When sats dropped during cough response patient was able to recover. No other consistencies trialed due to post extubation dysphagia and voice changes warranting VFSS prior to PO.     Recommend patient remain NPO with modified [place] Water Protocol for bolus size and rate regulation (see below). ST to follow-up to clinically reassess swallow and VFSS candidacy as appropriate. ST POC d/w patient, family and RN who verbalized understanding/agreement.      The rationale to recommend water when a PO diet cannot appropriately/functionally sustain nutrition (or if thickened liquids are prescribed due to aspiration of thin liquids) is because water is low risk for aspiration pna when compared to aspiration of food or other liquids.    Benefits of a water protocol include but are not limited to:     Oral gratification   Engagement of oropharyngeal swallow musculature   Decrease likelihood of  dehydration      Guidelines for proper implementation for this patient include:  Thorough oral care prior to consuming water  Upright at 90 degree hip flexion  Small single ice chips at slow rate with 1:1 supervision by nursing staff  Monitor for any changes in respiratory status and discontinue if distress noted    The [place] Free Water Protocol is a research based protocol established in 1984.         (P)   -       SLP Evaluation Clinical Impression    SLP Swallowing Diagnosis suspected pharyngeal dysphagia (P)   -    Functional Impact risk of aspiration/pneumonia;risk of malnutrition;risk of dehydration (P)   -    Rehab Potential/Prognosis, Swallowing re-evaluate goals as necessary (P)   -    Swallow Criteria for Skilled Therapeutic Interventions Met demonstrates skilled criteria (P)   -       Recommendations    Therapy Frequency (Swallow) PRN (P)   -    Predicted Duration Therapy Intervention (Days) until discharge (P)   -    SLP Diet Recommendation NPO;ice chips between meals after oral care, with supervision (P)   -    Recommended Diagnostics reassess via clinical swallow evaluation;reassess via VFSS ([facility]) (P)   -    Recommended Precautions and Strategies general aspiration precautions;1:1 supervision;other (see comments) (P)   modified [facility] (see note)  -    Oral Care Recommendations Oral Care BID/PRN (P)   -    SLP Rec. for Method of Medication Administration meds via alternate route (P)   -    Monitor for Signs of Aspiration yes;notify SLP if any concerns (P)   -       Swallow Goals (SLP)    Swallow LTGs Swallow Long Term Goal (free text) (P)   -    Swallow STGs diet tolerance goal selection (SLP) (P)   -    Diet Tolerance Goal Selection (SLP) Swallow Short Term Goal 1 (P)   -       (LTG) Swallow    (LTG) Swallow pt will improve functional outcome measure score from FOIS LEVEL II (nothing by mouth, minimal attempts at PO) to FOIS LEVEL V or higher (total oral intake of  multiple consistencies, requiring special preparation or compensations) (P)   -KP    [place] (Swallow Long Term Goal) with 1:1 assist/ supervision (P)   -KP    Time Frame (Swallow Long Term Goal) by discharge (P)   -KP    Progress/Outcomes (Swallow Long Term Goal) goal ongoing (P)   -KP       (STG) Swallow 1    (STG) Swallow 1 pt will consume PO trials for diagnostic tx at bedside or under fluoroscopy for further assessment oropharyngeal swallow function status in 100% opps (P)   -KP    [place] (Swallow Short Term Goal 1) independently (over 90% accuracy) (P)   -KP    Time Frame (Swallow Short Term Goal 1) 1 week (P)   -KP    Progress/Outcomes (Swallow Short Term Goal 1) goal ongoing (P)   -KP              User Key  (r) = Recorded By, (t) = Taken By, (c) = Cosigned By      Initials Name Effective Dates    Jane Levy, Speech Therapy Student 08/23/23 -                        SLP GOALS       Row Name 10/31/23 1327             (LTG) Swallow    (LTG) Swallow pt will improve functional outcome measure score from FOIS LEVEL II (nothing by mouth, minimal attempts at PO) to FOIS LEVEL V or higher (total oral intake of multiple consistencies, requiring special preparation or compensations) (P)   -KP      [place] (Swallow Long Term Goal) with 1:1 assist/ supervision (P)   -KP      Time Frame (Swallow Long Term Goal) by discharge (P)   -KP      Progress/Outcomes (Swallow Long Term Goal) goal ongoing (P)   -KP         (STG) Swallow 1    (STG) Swallow 1 pt will consume PO trials for diagnostic tx at bedside or under fluoroscopy for further assessment oropharyngeal swallow function status in 100% opps (P)   -KP      [place] (Swallow Short Term Goal 1) independently (over 90% accuracy) (P)   -KP      Time Frame (Swallow Short Term Goal 1) 1 week (P)   -KP      Progress/Outcomes (Swallow Short Term Goal 1) goal ongoing (P)   -KP                User Key  (r) = Recorded By, (t) = Taken By, (c) = Cosigned By       Initials Name Provider Type    Jane Levy, Speech Therapy Student SLP Student                       Time Calculation:                Jane Mccoy Speech Therapy Student  10/31/2023

## 2023-10-31 NOTE — THERAPY RE-EVALUATION
Patient Name: Diane Guan  : 1940    MRN: [id number]                              Today's Date: 10/31/2023       Admit Date: 10/10/2023    Visit Dx:     ICD-10-CM ICD-9-CM   1. Hypoxemia  R09.02 799.02   2. Chronic respiratory failure with hypoxia  J96.11 518.83     799.02   3. Rhinovirus infection  B34.8 079.3   4. Dyspnea, unspecified type  R06.00 786.09   5. Cough, unspecified type  R05.9 786.2   6. Rib pain on right side  R07.81 786.50   7. Right low back pain, unspecified chronicity, unspecified whether sciatica present  M54.50 724.2   8. Acute respiratory failure with hypoxia  J96.01 518.81   9. Pneumonia of left lower lobe due to infectious organism  J18.9 486   10. Mucus plugging of bronchi  T17.500A 519.19   11. Acute hypoxemic respiratory failure  J96.01 518.81   12. 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    Acquired hypothyroidism    Type 2 diabetes mellitus with stage 3b chronic kidney disease, without long-term current use of insulin    Insomnia    Postmenopausal status    Shoulder pain    Overactive bladder    Vitamin B 12 deficiency    Seasonal allergies    Absolute anemia    Vitamin D deficiency    LIBBY (obstructive sleep apnea)    Nocturnal hypoxia    Acute respiratory failure with hypoxia and hypercapnia    Abdominal pain    COPD (chronic obstructive pulmonary disease)    CKD (chronic kidney disease) stage 3, GFR 30-59 ml/min    Sepsis due to pneumonia    Acute on chronic respiratory failure with hypoxemia    Class 1 obesity due to excess calories with serious  comorbidity and body mass index (BMI) of 32.0 to 32.9 in adult    Acute hypoxemic respiratory failure    History of breast cancer    Personal history of CLL (chronic lymphocytic leukemia)    History of cigarette smoking    Fall at home    Rhinovirus infection    Acute respiratory failure    Hyperkalemia    Respiratory failure     Past Medical History:   Diagnosis Date    Acute bronchitis due to human metapneumovirus 02/08/2020    Anxiety disorder     Arthritis     Breast cancer 02/2019    Invasive ductal carcinoma    Chronic lymphocytic leukemia (CLL), B-cell 01/2019    Depression     Disease of thyroid gland     Diverticulosis of colon 2018    Identified on colonoscopy    Dysphagia 12/2020    Dyspnea on exertion     Fall at home 10/11/2023    Hemorrhoid     Hiatal hernia 12/2020    Hiatal hernia with GERD     large hiatal hernia with high-grade reflux on barium swallow; s/p laparoscopic fundoplication with gastropexy    History of breast cancer 10/11/2023    History of cigarette smoking 10/11/2023    History of diabetes mellitus     no meds now    Hyperlipidemia     Hypertension     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    aspiration pneumonia; completed abx therapy    OAB (overactive bladder)     Personal history of CLL (chronic lymphocytic leukemia) 10/11/2023    Skin cancer     Sleep apnea     daughter states pt does not have and doesn't have machine     Past Surgical History:   Procedure Laterality Date    BLADDER SURGERY      BREAST BIOPSY      BRONCHOSCOPY N/A 09/13/2019    Procedure: BRONCHOSCOPY with bronchial washing;  Surgeon: Marnie Frankel MD;  Location: [facility] ENDOSCOPY;  Service: Pulmonary    BRONCHOSCOPY Bilateral 10/18/2023    Procedure: BRONCHOSCOPY AT BEDSIDE;  Surgeon: Vinicius Heredia DO;  Location: [facility] ENDOSCOPY;  Service: Pulmonary;  Laterality: Bilateral;    COLONOSCOPY  07/19/2018    severe diverticulosis    CYST REMOVAL      Removed a fatty cyst off of her back    ENDOSCOPY N/A  11/23/2020    Procedure: ESOPHAGOGASTRODUODENOSCOPY with dilatation (Bougie # 48, 50, 52, 54, 56, 58);  Surgeon: Den Plummer DO;  Location: [facility] ENDOSCOPY;  Service: General;  Laterality: N/A;  Post: large hiatal hernia, joshua's ulcers    HIATAL HERNIA REPAIR N/A 12/30/2020    Procedure: Laparoscopic hiatal hernia repair with gastropexy;  Surgeon: Den Plummer DO;  Location: [facility] MAIN OR;  Service: General;  Laterality: N/A;    MASTECTOMY Right 02/04/2019    Invasive ductal carcinoma    TUBAL ABDOMINAL LIGATION        General Information       Row Name 10/31/23 1314          Physical Therapy Time and Intention    Document Type re-evaluation  -     Mode of Treatment physical therapy  -       Row Name 10/31/23 1314          General Information    Patient Profile Reviewed yes  -     Prior Level of Function independent:  -     Existing Precautions/Restrictions oxygen therapy device and L/min;fall  -     Barriers to Rehab medically complex;hearing deficit  -       Row Name 10/31/23 1314          Living Environment    People in Home spouse  -       Row Name 10/31/23 1314          Home Main Entrance    Number of Stairs, Main Entrance five  -       Row Name 10/31/23 1314          Stairs Within Home, Primary    Number of Stairs, Within Home, Primary none  -       Row Name 10/31/23 1314          Cognition    Orientation Status (Cognition) oriented x 4  -       Row Name 10/31/23 1314          Safety Issues, Functional Mobility    Impairments Affecting Function (Mobility) endurance/activity tolerance;shortness of breath;strength  -               User Key  (r) = Recorded By, (t) = Taken By, (c) = Cosigned By      Initials Name Provider Type     Kinga Delatorre, PT Physical Therapist                   Mobility       Row Name 10/31/23 1315          Bed Mobility    Bed Mobility supine-sit  -     Supine-Sit [place] (Bed Mobility) minimum assist (75% patient effort);2 person assist  -      Assistive Device (Bed Mobility) bed rails;draw sheet;head of bed elevated  -[facility] Name 10/31/23 1315          Bed-Chair Transfer    Bed-Chair [place] (Transfers) minimum assist (75% patient effort);2 person assist  -[facility] Name 10/31/23 1315          Sit-Stand Transfer    Sit-Stand [place] (Transfers) minimum assist (75% patient effort)  -[facility] Name 10/31/23 1315          Gait/Stairs (Locomotion)    [place] Level (Gait) not tested  -               User Key  (r) = Recorded By, (t) = Taken By, (c) = Cosigned By      Initials Name Provider Type     Kinga Delatorre, PT Physical Therapist                   Obj/Interventions       [facility] Name 10/31/23 1315          Range of Motion Comprehensive    General Range of Motion bilateral upper extremity ROM WFL  -[facility] Name 10/31/23 1315          Strength Comprehensive (MMT)    Comment, General Manual Muscle Testing (MMT) Assessment BLEs gross 4/5  -[facility] Name 10/31/23 1315          Balance    Balance Assessment sitting static balance;sitting dynamic balance;standing static balance;standing dynamic balance  -     Static Sitting Balance independent  -     Dynamic Sitting Balance standby assist  -     Position, Sitting Balance sitting edge of bed  -     Static Standing Balance minimal assist  -     Dynamic Standing Balance minimal assist  -[facility] Name 10/31/23 1315          Sensory Assessment (Somatosensory)    Sensory Assessment (Somatosensory) sensation intact  -               User Key  (r) = Recorded By, (t) = Taken By, (c) = Cosigned By      Initials Name Provider Type     Kinga Delatorre, PT Physical Therapist                   Goals/Plan       [facility] Name 10/31/23 1320          Bed Mobility Goal 1 (PT)    Activity/Assistive Device (Bed Mobility Goal 1, PT) bed mobility activities, all  -     [place] Level/Cues Needed (Bed Mobility Goal 1, PT) independent  -     Time Frame (Bed Mobility Goal 1, PT) 2  weeks  -       Row Name 10/31/23 1320          Transfer Goal 1 (PT)    Activity/Assistive Device (Transfer Goal 1, PT) transfers, all  -JH     [place] Level/Cues Needed (Transfer Goal 1, PT) modified independence  -JH     Time Frame (Transfer Goal 1, PT) 2 weeks  -       Row Name 10/31/23 1320          Gait Training Goal 1 (PT)    Activity/Assistive Device (Gait Training Goal 1, PT) gait (walking locomotion);assistive device use;walker, rolling;increase endurance/gait distance  -     [place] Level (Gait Training Goal 1, PT) contact guard required  -JH     Distance (Gait Training Goal 1, PT) 100'  -JH     Time Frame (Gait Training Goal 1, PT) 2 weeks  -       Row Name 10/31/23 1320          Stairs Goal 1 (PT)    [place] Level/Cues Needed (Stairs Goal 1, PT) contact guard required  -     Number of Stairs (Stairs Goal 1, PT) 5  -JH     Time Frame (Stairs Goal 1, PT) 2 weeks  -       Row Name 10/31/23 1320          Therapy Assessment/Plan (PT)    Planned Therapy Interventions (PT) bed mobility training;gait training;transfer training;stair training;strengthening;patient/family education;home exercise program;balance training  -               User Key  (r) = Recorded By, (t) = Taken By, (c) = Cosigned By      Initials Name Provider Type    Kinga Gage, PT Physical Therapist                   Clinical Impression       Row Name 10/31/23 1315          Pain    Additional Documentation Pain Scale: FACES Pre/Post-Treatment (Group)  -       Row Name 10/31/23 1315          Pain Scale: FACES Pre/Post-Treatment    Pain: FACES Scale, Pretreatment 2-->hurts little bit  -     Posttreatment Pain Rating 2-->hurts little bit  -     Pain Location - back  -       Row Name 10/31/23 1315          Plan of Care Review    Plan of Care Reviewed With patient;mina(marco)  -     Outcome Evaluation Completed re-eval this date as pt has not been seen by PT since 10/15.  Pt has been in ICU since initial  eval requiring intubation x 2.  Pt is now extubated and on PCU.  Pt is currently on HFNC at 15L.  Pt is awake and alert, eager to get OOB and wondering if she is allowed to use bedside commode.  Pt tranfsers to EOB with min A x2, sat EOB x 10 minutes while SLP assessed swallow.  Pt stood with min A and transfers to chair with min A x2.  Sats remain in mid 90s with activity, lowest sats dropped was briefly to 88%.  Will follow pt 5x/week for progression of mobility as tolerated.  Recommend SNF for rehab at d/c.  -       Row Name 10/31/23 1312          Therapy Assessment/Plan (PT)    Rehab Potential (PT) good, to achieve stated therapy goals  -     Criteria for Skilled Interventions Met (PT) yes;skilled treatment is necessary  -     Therapy Frequency (PT) 5 times/wk  -       Row Name 10/31/23 1315          Vital Signs    Pre SpO2 (%) 98  -     O2 Delivery Pre Treatment hi-flow  15  -     Intra SpO2 (%) 88  -     O2 Delivery Intra Treatment hi-flow  -     Post SpO2 (%) 95  -     O2 Delivery Post Treatment hi-flow  -       Row Name 10/31/23 1312          Positioning and Restraints    Pre-Treatment Position in bed  -     Post Treatment Position chair  -     In Chair notified nsg;reclined;call light within reach;encouraged to call for assist;exit alarm on;with family/caregiver  -               User Key  (r) = Recorded By, (t) = Taken By, (c) = Cosigned By      Initials Name Provider Type     Kinga Delatorre, PT Physical Therapist                   Outcome Measures       Row Name 10/31/23 132          How much help from another person do you currently need...    Turning from your back to your side while in flat bed without using bedrails? 3  -JH     Moving from lying on back to sitting on the side of a flat bed without bedrails? 3  -JH     Moving to and from a bed to a chair (including a wheelchair)? 3  -JH     Standing up from a chair using your arms (e.g., wheelchair, bedside chair)? 3  -JH      Climbing 3-5 steps with a railing? 2  -JH     To walk in hospital room? 2  -JH     AM-PAC 6 Clicks Score (PT) 16  -     Highest level of mobility 5 --> Static standing  -               User Key  (r) = Recorded By, (t) = Taken By, (c) = Cosigned By      Initials Name Provider Type    Kinga Gage, PT Physical Therapist                                 Physical Therapy Education       Title: PT OT SLP Therapies (In Progress)       Topic: Physical Therapy (In Progress)       Point: Mobility training (In Progress)       Learning Progress Summary             Patient Acceptance, E,TB, NR by AL at 10/30/2023 0556    Acceptance, E,TB, VU by MM at 10/12/2023 1421    Acceptance, E,TB, VU by DB at 10/11/2023 1433   Family Acceptance, E,TB, NR by AL at 10/30/2023 0556    Acceptance, E, VU,NR by RE at 10/27/2023 0451    Comment: daughter    Acceptance, E, VU,NR by RE at 10/25/2023 0626    Comment: daughter                         Point: Home exercise program (In Progress)       Learning Progress Summary             Patient Acceptance, E,TB, NR by AL at 10/30/2023 0556    Acceptance, E,TB, VU by MM at 10/12/2023 1421   Family Acceptance, E,TB, NR by AL at 10/30/2023 0556    Acceptance, E, VU,NR by RE at 10/27/2023 0451    Comment: daughter    Acceptance, E, VU,NR by RE at 10/25/2023 0626    Comment: daughter                         Point: Body mechanics (In Progress)       Learning Progress Summary             Patient Acceptance, E,TB, NR by AL at 10/30/2023 0556    Acceptance, E,TB, VU by MM at 10/12/2023 1421   Family Acceptance, E,TB, NR by AL at 10/30/2023 0556    Acceptance, E, VU,NR by RE at 10/27/2023 0451    Comment: daughter    Acceptance, E, VU,NR by RE at 10/25/2023 0626    Comment: daughter                         Point: Precautions (In Progress)       Learning Progress Summary             Patient Acceptance, E,TB, NR by AL at 10/30/2023 0556    Acceptance, E,TB, VU by MM at 10/12/2023 1421   Family Acceptance,  E,TB, NR by AL at 10/30/2023 0556    Acceptance, E, VU,NR by RE at 10/27/2023 0451    Comment: daughter    Acceptance, E, VU,NR by RE at 10/25/2023 0647    Comment: daughter                                         User Key       Initials Effective Dates Name Provider Type Discipline    AL 04/29/22 -  Nilsa Mendiola LPN Licensed Nurse Nurse    RE 06/16/21 -  Merle Lam RN Registered Nurse Nurse    MM 07/07/23 -  Mallika Mckeon RN Registered Nurse Nurse    DB 08/16/23 -  James Cantu, NELSON Student PT Student PT                  PT Recommendation and Plan  Planned Therapy Interventions (PT): bed mobility training, gait training, transfer training, stair training, strengthening, patient/family education, home exercise program, balance training  Plan of Care Reviewed With: patient, grandchild(marco)  Outcome Evaluation: Completed re-eval this date as pt has not been seen by PT since 10/15.  Pt has been in ICU since initial eval requiring intubation x 2.  Pt is now extubated and on PCU.  Pt is currently on HFNC at 15L.  Pt is awake and alert, eager to get OOB and wondering if she is allowed to use bedside commode.  Pt tranfsers to EOB with min A x2, sat EOB x 10 minutes while SLP assessed swallow.  Pt stood with min A and transfers to chair with min A x2.  Sats remain in mid 90s with activity, lowest sats dropped was briefly to 88%.  Will follow pt 5x/week for progression of mobility as tolerated.  Recommend SNF for rehab at d/c.     Time Calculation:         PT Charges       Row Name 10/31/23 1322             Time Calculation    Start Time 1118  -      Stop Time 1138  -      Time Calculation (min) 20 min  -      PT Received On 10/31/23  -      PT - Next Appointment 11/01/23  -      PT Goal Re-Cert Due Date 11/14/23  -         Time Calculation- PT    Total Timed Code Minutes- PT 8 minute(s)  -                User Key  (r) = Recorded By, (t) = Taken By, (c) = Cosigned By      Initials Name Provider Type     Kinga Gage, PT Physical Therapist                  Therapy Charges for Today       Code Description Service Date Service Provider Modifiers Qty    66269762387 HC PT RE-EVAL ESTABLISHED PLAN 2 10/31/2023 Kinga Delatorre, PT GP 1    60149956449 HC PT THERAPEUTIC ACT EA 15 MIN 10/31/2023 Kinga Delatorre, PT GP 1            PT G-Codes  Outcome Measure Options: AM-PAC 6 Clicks Daily Activity (OT)  AM-PAC 6 Clicks Score (PT): 16  AM-PAC 6 Clicks Score (OT): 17  PT Discharge Summary  Anticipated Discharge Disposition (PT): skilled nursing facility    Kinga Delatorre PT  10/31/2023

## 2023-10-31 NOTE — THERAPY RE-EVALUATION
Patient Name: Diane Guan  : 1940    MRN: [id number]                              Today's Date: 10/31/2023       Admit Date: 10/10/2023    Visit Dx:     ICD-10-CM ICD-9-CM   1. Hypoxemia  R09.02 799.02   2. Chronic respiratory failure with hypoxia  J96.11 518.83     799.02   3. Rhinovirus infection  B34.8 079.3   4. Dyspnea, unspecified type  R06.00 786.09   5. Cough, unspecified type  R05.9 786.2   6. Rib pain on right side  R07.81 786.50   7. Right low back pain, unspecified chronicity, unspecified whether sciatica present  M54.50 724.2   8. Acute respiratory failure with hypoxia  J96.01 518.81   9. Pneumonia of left lower lobe due to infectious organism  J18.9 486   10. Mucus plugging of bronchi  T17.500A 519.19   11. Acute hypoxemic respiratory failure  J96.01 518.81   12. 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    Acquired hypothyroidism    Type 2 diabetes mellitus with stage 3b chronic kidney disease, without long-term current use of insulin    Insomnia    Postmenopausal status    Shoulder pain    Overactive bladder    Vitamin B 12 deficiency    Seasonal allergies    Absolute anemia    Vitamin D deficiency    LIBBY (obstructive sleep apnea)    Nocturnal hypoxia    Acute respiratory failure with hypoxia and hypercapnia    Abdominal pain    COPD (chronic obstructive pulmonary disease)    CKD (chronic kidney disease) stage 3, GFR 30-59 ml/min    Sepsis due to pneumonia    Acute on chronic respiratory failure with hypoxemia    Class 1 obesity due to excess calories with serious  comorbidity and body mass index (BMI) of 32.0 to 32.9 in adult    Acute hypoxemic respiratory failure    History of breast cancer    Personal history of CLL (chronic lymphocytic leukemia)    History of cigarette smoking    Fall at home    Rhinovirus infection    Acute respiratory failure    Hyperkalemia    Respiratory failure     Past Medical History:   Diagnosis Date    Acute bronchitis due to human metapneumovirus 02/08/2020    Anxiety disorder     Arthritis     Breast cancer 02/2019    Invasive ductal carcinoma    Chronic lymphocytic leukemia (CLL), B-cell 01/2019    Depression     Disease of thyroid gland     Diverticulosis of colon 2018    Identified on colonoscopy    Dysphagia 12/2020    Dyspnea on exertion     Fall at home 10/11/2023    Hemorrhoid     Hiatal hernia 12/2020    Hiatal hernia with GERD     large hiatal hernia with high-grade reflux on barium swallow; s/p laparoscopic fundoplication with gastropexy    History of breast cancer 10/11/2023    History of cigarette smoking 10/11/2023    History of diabetes mellitus     no meds now    Hyperlipidemia     Hypertension     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    aspiration pneumonia; completed abx therapy    OAB (overactive bladder)     Personal history of CLL (chronic lymphocytic leukemia) 10/11/2023    Skin cancer     Sleep apnea     daughter states pt does not have and doesn't have machine     Past Surgical History:   Procedure Laterality Date    BLADDER SURGERY      BREAST BIOPSY      BRONCHOSCOPY N/A 09/13/2019    Procedure: BRONCHOSCOPY with bronchial washing;  Surgeon: Marnie Frankel MD;  Location: [facility] ENDOSCOPY;  Service: Pulmonary    BRONCHOSCOPY Bilateral 10/18/2023    Procedure: BRONCHOSCOPY AT BEDSIDE;  Surgeon: Vinicius Heredia DO;  Location: [facility] ENDOSCOPY;  Service: Pulmonary;  Laterality: Bilateral;    COLONOSCOPY  07/19/2018    severe diverticulosis    CYST REMOVAL      Removed a fatty cyst off of her back    ENDOSCOPY N/A  11/23/2020    Procedure: ESOPHAGOGASTRODUODENOSCOPY with dilatation (Bougie # 48, 50, 52, 54, 56, 58);  Surgeon: Den Plummer DO;  Location: [facility] ENDOSCOPY;  Service: General;  Laterality: N/A;  Post: large hiatal hernia, joshua's ulcers    HIATAL HERNIA REPAIR N/A 12/30/2020    Procedure: Laparoscopic hiatal hernia repair with gastropexy;  Surgeon: Den Plummer DO;  Location: [facility] MAIN OR;  Service: General;  Laterality: N/A;    MASTECTOMY Right 02/04/2019    Invasive ductal carcinoma    TUBAL ABDOMINAL LIGATION        General Information       Row Name 10/31/23 1647          OT Time and Intention    Document Type evaluation  -MP     Mode of Treatment occupational therapy  -MP       Row Name 10/31/23 1647          General Information    Patient Profile Reviewed yes  -MP     Prior Level of Function independent:;ADL's  -MP     Existing Precautions/Restrictions oxygen therapy device and L/min;fall  -MP       Row Name 10/31/23 1647          Living Environment    People in Home spouse  -MP       Row Name 10/31/23 1647          Cognition    Orientation Status (Cognition) oriented x 4  -MP       Row Name 10/31/23 1647          Safety Issues, Functional Mobility    Impairments Affecting Function (Mobility) endurance/activity tolerance;shortness of breath;strength  -MP               User Key  (r) = Recorded By, (t) = Taken By, (c) = Cosigned By      Initials Name Provider Type    MP Noam Dunn OT Occupational Therapist                     Mobility/ADL's       Row Name 10/31/23 1648          Bed Mobility    Bed Mobility supine-sit  -MP     All Activities, Pool (Bed Mobility) modified independence  -MP     Supine-Sit Pool (Bed Mobility) minimum assist (75% patient effort);2 person assist  -MP     Assistive Device (Bed Mobility) bed rails;draw sheet;head of bed elevated  -MP       Row Name 10/31/23 1648          Transfers    Transfers sit-stand transfer  -MP       Row Name 10/31/23 1648           Bed-Chair Transfer    Bed-Chair [place] (Transfers) minimum assist (75% patient effort);2 person assist  -MP       Row Name 10/31/23 1648          Sit-Stand Transfer    Sit-Stand [place] (Transfers) minimum assist (75% patient effort)  -MP     Assistive Device (Sit-Stand Transfers) walker, front-wheeled  -MP       Row Name 10/31/23 1648          Activities of Daily Living    BADL Assessment/Intervention lower body dressing  -MP       Row Name 10/31/23 1648          Lower Body Dressing Assessment/Training    [place] Level (Lower Body Dressing) maximum assist (25% patient effort)  -MP               User Key  (r) = Recorded By, (t) = Taken By, (c) = Cosigned By      Initials Name Provider Type    Noam Hahn OT Occupational Therapist                   Obj/Interventions       Row Name 10/31/23 1652          Range of Motion Comprehensive    Comment, General Range of Motion BUE WFL  -MP       Row Name 10/31/23 1652          Strength Comprehensive (MMT)    Comment, General Manual Muscle Testing (MMT) Assessment BUE 4-/5  -MP       Row Name 10/31/23 1652          Balance    Balance Interventions sitting;standing;sit to stand;supported;static;dynamic  -MP               User Key  (r) = Recorded By, (t) = Taken By, (c) = Cosigned By      Initials Name Provider Type    Noam Hahn OT Occupational Therapist                   Goals/Plan       Row Name 10/31/23 1658          Bed Mobility Goal 1 (OT)    Activity/Assistive Device (Bed Mobility Goal 1, OT) bed mobility activities, all  -MP     [place] Level/Cues Needed (Bed Mobility Goal 1, OT) contact guard required  -MP     Time Frame (Bed Mobility Goal 1, OT) long term goal (LTG);2 weeks  -MP       Row Name 10/31/23 1658          Transfer Goal 1 (OT)    Activity/Assistive Device (Transfer Goal 1, OT) sit-to-stand/stand-to-sit;toilet  -MP     [place] Level/Cues Needed (Transfer Goal 1, OT) contact guard required  -MP     Time  Frame (Transfer Goal 1, OT) long term goal (LTG);2 weeks  -MP       Row Name 10/31/23 1658          Dressing Goal 1 (OT)    Activity/Device (Dressing Goal 1, OT) lower body dressing  -MP     [place]/Cues Needed (Dressing Goal 1, OT) minimum assist (75% or more patient effort)  -MP     Time Frame (Dressing Goal 1, OT) long term goal (LTG);2 weeks  -MP               User Key  (r) = Recorded By, (t) = Taken By, (c) = Cosigned By      Initials Name Provider Type    Noam Hahn, ARLET Occupational Therapist                   Clinical Impression       Row Name 10/31/23 1653          Pain Assessment    Pretreatment Pain Rating 6/10  -MP     Posttreatment Pain Rating 6/10  -MP       Row Name 10/31/23 1653          Pain Scale: FACES Pre/Post-Treatment    Pain Location - back  -MP       Row Name 10/31/23 1653          Plan of Care Review    Plan of Care Reviewed With patient  -MP     Progress no change  -MP     Outcome Evaluation Reevaluationg completed this date secondary to complicated hospital stay, intubation x 2 and patient recently transfered from ICU. Pt. on 15L O2 this date. Pt. is A and O x 3, completes bed mobility and SPS transfer EOB to armchair w/ min A x 2 and rolling walker support. Pt. w/ limited standing tolerance desats 87-88% and recovers > 90% w/ PBL technique and education. Pt. provided max A for all LB ADLs this date. Anticipate SNF placement at d/c to address aforementioned deficits, will follow up w/ pt. 1-3x per week at [facility] to assess and monitor progress.  -MP       Row Name 10/31/23 1653          Therapy Assessment/Plan (OT)    Rehab Potential (OT) good, to achieve stated therapy goals  -     Criteria for Skilled Therapeutic Interventions Met (OT) yes;skilled treatment is necessary  -MP     Therapy Frequency (OT) 3 times/wk  -MP       Row Name 10/31/23 1653          Therapy Plan Review/Discharge Plan (OT)    Anticipated Discharge Disposition (OT) skilled nursing facility  -       [place]  Name 10/31/23 1653          Vital Signs    Pre Patient Position Supine  -MP     Intra Patient Position Standing  -MP     Post Patient Position Sitting  -MP       Row Name 10/31/23 1653          Positioning and Restraints    Pre-Treatment Position in bed  -MP     Post Treatment Position chair  -MP     In Chair sitting;call light within reach;encouraged to call for assist;exit alarm on  -MP               User Key  (r) = Recorded By, (t) = Taken By, (c) = Cosigned By      Initials Name Provider Type    Noam Hahn OT Occupational Therapist                   Outcome Measures       Row Name 10/31/23 1321          How much help from another person do you currently need...    Turning from your back to your side while in flat bed without using bedrails? 3  -JH     Moving from lying on back to sitting on the side of a flat bed without bedrails? 3  -JH     Moving to and from a bed to a chair (including a wheelchair)? 3  -JH     Standing up from a chair using your arms (e.g., wheelchair, bedside chair)? 3  -JH     Climbing 3-5 steps with a railing? 2  -JH     To walk in hospital room? 2  -JH     AM-PAC 6 Clicks Score (PT) 16  -JH     Highest level of mobility 5 --> Static standing  -JH               User Key  (r) = Recorded By, (t) = Taken By, (c) = Cosigned By      Initials Name Provider Type    Kinga Gage, PT Physical Therapist                    Occupational Therapy Education       Title: PT OT SLP Therapies (In Progress)       Topic: Occupational Therapy (In Progress)       Point: ADL training (In Progress)       Description:   Instruct learner(s) on proper safety adaptation and remediation techniques during self care or transfers.   Instruct in proper use of assistive devices.                  Learning Progress Summary             Patient Acceptance, E,TB, NR by AL at 10/30/2023 0556    Acceptance, E,TB, VU by MM at 10/12/2023 1421    Acceptance, E, VU by MK at 10/11/2023 1312   Family Acceptance, E,TB,  NR by AL at 10/30/2023 0556    Acceptance, E, VU,NR by RE at 10/27/2023 0451    Comment: daughter    Acceptance, E, VU,NR by RE at 10/25/2023 0626    Comment: daughter    Acceptance, E, VU by MK at 10/11/2023 1312                         Point: Home exercise program (In Progress)       Description:   Instruct learner(s) on appropriate technique for monitoring, assisting and/or progressing therapeutic exercises/activities.                  Learning Progress Summary             Patient Acceptance, E,TB, NR by AL at 10/30/2023 0556    Acceptance, E,TB, VU by MM at 10/12/2023 1421   Family Acceptance, E,TB, NR by AL at 10/30/2023 0556    Acceptance, E, VU,NR by RE at 10/27/2023 0451    Comment: daughter    Acceptance, E, VU,NR by RE at 10/25/2023 0626    Comment: daughter                         Point: Precautions (In Progress)       Description:   Instruct learner(s) on prescribed precautions during self-care and functional transfers.                  Learning Progress Summary             Patient Acceptance, E,TB, NR by AL at 10/30/2023 0556    Acceptance, E,TB, VU by MM at 10/12/2023 1421   Family Acceptance, E,TB, NR by AL at 10/30/2023 0556    Acceptance, E, VU,NR by RE at 10/27/2023 0451    Comment: daughter    Acceptance, E, VU,NR by RE at 10/25/2023 0626    Comment: daughter                         Point: Body mechanics (In Progress)       Description:   Instruct learner(s) on proper positioning and spine alignment during self-care, functional mobility activities and/or exercises.                  Learning Progress Summary             Patient Acceptance, E,TB, VU by MP at 10/31/2023 1659    Acceptance, E,TB, NR by AL at 10/30/2023 0556    Acceptance, E,TB, VU by MM at 10/12/2023 1421   Family Acceptance, E,TB, NR by AL at 10/30/2023 0556    Acceptance, E, VU,NR by RE at 10/27/2023 0451    Comment: daughter    Acceptance, E, VU,NR by RE at 10/25/2023 0626    Comment: daughter                                          User Key       Initials Effective Dates Name Provider Type Discipline    MP 06/16/21 -  Noam Dunn OT Occupational Therapist OT    AL 04/29/22 -  Nilsa Mendiola LPN Licensed Nurse Nurse    RE 06/16/21 -  Merle Lam RN Registered Nurse Nurse    MM 07/07/23 -  Mallika Mckeon, RN Registered Nurse Nurse    MK 02/17/22 -  Kunal Bullard OT Occupational Therapist OT                  OT Recommendation and Plan  Therapy Frequency (OT): 3 times/wk  Plan of Care Review  Plan of Care Reviewed With: patient  Progress: no change  Outcome Evaluation: Reevaluationg completed this date secondary to complicated hospital stay, intubation x 2 and patient recently transfered from ICU. Pt. on 15L O2 this date. Pt. is A and O x 3, completes bed mobility and SPS transfer EOB to armchair w/ min A x 2 and rolling walker support. Pt. w/ limited standing tolerance desats 87-88% and recovers > 90% w/ PBL technique and education. Pt. provided max A for all LB ADLs this date. Anticipate SNF placement at d/c to address aforementioned deficits, will follow up w/ pt. 1-3x per week at [facility] to assess and monitor progress.     Time Calculation:         Time Calculation- OT       Row Name 10/31/23 1659             Time Calculation- OT    OT Start Time 1118  -MP      OT Stop Time 1138  -      OT Time Calculation (min) 20 min  -      Total Timed Code Minutes- OT 8 minute(s)  -      OT Received On 10/31/23  -      OT - Next Appointment 11/02/23  -      OT Goal Re-Cert Due Date 11/14/23  -                User Key  (r) = Recorded By, (t) = Taken By, (c) = Cosigned By      Initials Name Provider Type    MP Noam Dunn OT Occupational Therapist                  Therapy Charges for Today       Code Description Service Date Service Provider Modifiers Qty    [id number] HC OT RE-EVAL 2 10/31/2023 Noam Dunn OT GO 1    [id number]  OT SELF CARE/MGMT/TRAIN EA 15 MIN 10/31/2023 Noam Dunn OT GO 1                  Noam Dunn, OT  10/31/2023

## 2023-10-31 NOTE — PROGRESS NOTES
" LOS: 21 days   Patient Care Team:  Asia Queen APRN as PCP - General (Nurse Practitioner)  Asia Queen APRN as Nurse Practitioner (Nurse Practitioner)  Ling Bolden MD as Consulting Physician (Hematology and Oncology)      Subjective \"Doing ok\"    Interval History: Increased O2 needs overnight.  Nurse reports she is tolerating tube feeds.    Subjective: Some nausea but no vomiting.  No abdominal pain.  Some shortness of breath.    ROS:   No chest pain, +shortness of breath, no cough.      Medication Review:     Current Facility-Administered Medications:     acetaminophen (TYLENOL) tablet 650 mg, 650 mg, Nasogastric, Q4H PRN, Vinicius Heredia DO    amLODIPine (NORVASC) tablet 10 mg, 10 mg, Nasogastric, Q24H, Ra Myers MD, 10 mg at 10/31/23 1002    chlorthalidone (HYGROTON) tablet 25 mg, 25 mg, Nasogastric, Daily, Day, Anita G, APRN, 25 mg at 10/31/23 1000    cyclobenzaprine (FLEXERIL) tablet 10 mg, 10 mg, Nasogastric, TID PRN, Vinicius Heredia DO, 10 mg at 10/28/23 1731    dextrose (D50W) (25 g/50 mL) IV injection 25 g, 25 g, Intravenous, Q15 Min PRN, Kalyan Mckeon MD, 25 g at 10/26/23 0614    dextrose (D50W) (25 g/50 mL) IV injection 50 mL, 50 mL, Intravenous, Q1H PRN, Ra Myers MD, 50 mL at 10/14/23 1235    dextrose (GLUTOSE) oral gel 15 g, 15 g, Nasogastric, Q15 Min PRN, Vinicuis Heredia DO    Enoxaparin Sodium (LOVENOX) syringe 40 mg, 40 mg, Subcutaneous, Daily, Foreign Bolton MD, 40 mg at 10/28/23 1635    Ferrous Sulfate 300 (60 Fe) MG/5ML solution 300 mg, 300 mg, Nasogastric, Once per day on Mon Wed Fri, Ra Myers MD, 300 mg at 10/30/23 1008    glucagon (GLUCAGEN) injection 1 mg, 1 mg, Intramuscular, Q15 Min PRN, Kalyan Mckeon MD    hydrALAZINE (APRESOLINE) injection 10 mg, 10 mg, Intravenous, Q6H PRN, Ramona Wilder APRN, 10 mg at 10/29/23 0624    HYDROmorphone (DILAUDID) injection 0.5 mg, 0.5 mg, Intravenous, Q3H PRN, " Penny Tomlinson, APRN, 0.5 mg at 10/30/23 2012    insulin lispro (HUMALOG/ADMELOG) injection 4-24 Units, 4-24 Units, Subcutaneous, Q6H, Vinicius Heredia DO, 4 Units at 10/30/23 1824    ipratropium-albuterol (DUO-NEB) nebulizer solution 3 mL, 3 mL, Nebulization, Q6H PRN, Day, Dawn G, APRN, 3 mL at 10/30/23 2330    lansoprazole (PREVACID SOLUTAB) disintegrating tablet Tablet Delayed Release Dispersible 30 mg, 30 mg, Nasogastric, Q AM, Day, Dawn G, APRN, 30 mg at 10/31/23 0657    levothyroxine (SYNTHROID, LEVOTHROID) tablet 100 mcg, 100 mcg, Nasogastric, Q AM, Day, Dawn G, APRN, 100 mcg at 10/31/23 0657    lidocaine (XYLOCAINE) 5 % ointment, , , PRN, Vinicius Heredia DO, 1 application  at 10/18/23 1449    LORazepam (ATIVAN) injection 0.5 mg, 0.5 mg, Intravenous, Q6H PRN, Ra Myers MD    losartan (COZAAR) tablet 25 mg, 25 mg, Oral, Q24H, Ra Myers MD    metoclopramide (REGLAN) tablet 5 mg, 5 mg, Nasogastric, Q8H, Ra Myers MD, 5 mg at 10/31/23 1001    nitroglycerin (NITROSTAT) SL tablet 0.4 mg, 0.4 mg, Sublingual, Q5 Min PRN, Kalyan Mckeon MD    ondansetron (ZOFRAN) injection 4 mg, 4 mg, Intravenous, Q6H PRN, Kalyan Mckeon MD, 4 mg at 10/31/23 0734    oxyCODONE (ROXICODONE) immediate release tablet 5 mg, 5 mg, Nasogastric, Q6H PRN, Vinicius Heredia DO, 5 mg at 10/30/23 1759    pantoprazole (PROTONIX) injection 40 mg, 40 mg, Intravenous, Q12H, Fritz Archibald MD, 40 mg at 10/31/23 1000    polyethylene glycol (MIRALAX) packet 17 g, 17 g, Oral, Daily PRN, Foreign Bolton MD, 17 g at 10/30/23 2313    rOPINIRole (REQUIP) tablet 0.25 mg, 0.25 mg, Nasogastric, Nightly, Foreign Bolton MD, 0.25 mg at 10/30/23 2009    rosuvastatin (CRESTOR) tablet 10 mg, 10 mg, Nasogastric, Nightly, Day, Anita G, APRN, 10 mg at 10/30/23 2009    sennosides-docusate (PERICOLACE) 8.6-50 MG per tablet 2 tablet, 2 tablet, Nasogastric, BID, Foreign Bolton MD, 2 tablet at 10/31/23  1000    sertraline (ZOLOFT) tablet 150 mg, 150 mg, Nasogastric, Daily, Foreign Bolton MD, 150 mg at 10/31/23 1000    [COMPLETED] Insert Peripheral IV, , , Once **AND** sodium chloride 0.9 % flush 10 mL, 10 mL, Intravenous, PRN, Sheree Field PA-C    sodium chloride 0.9 % flush 10 mL, 10 mL, Intravenous, Q12H, Kalyan Mckeon MD, 10 mL at 10/31/23 1001    sodium chloride 0.9 % flush 10 mL, 10 mL, Intravenous, PRN, Kalyan Mckeon MD    sodium chloride 0.9 % flush 10 mL, 10 mL, Intravenous, Q12H, Day, Anita G, APRN, 10 mL at 10/31/23 1001    sodium chloride 0.9 % flush 10 mL, 10 mL, Intravenous, PRN, Day, Anita G, APRN    sodium chloride 0.9 % infusion 40 mL, 40 mL, Intravenous, PRN, Kalyan Mckeon MD    sodium chloride 0.9 % infusion 40 mL, 40 mL, Intravenous, PRN, Day, Anita G, APRN    sucralfate (CARAFATE) tablet 1 g, 1 g, Nasogastric, 4x Daily AC & at Bedtime, Ra Myers MD, 1 g at 10/31/23 1000      Objective chronically ill-appearing but no acute distress, family at bedside    Vital Signs  Vitals:    10/31/23 0727 10/31/23 0845 10/31/23 0959 10/31/23 1105   BP:  136/53 149/59    BP Location:  Left arm Left arm    Patient Position:  Lying Lying    Pulse:       Resp:  23 22    Temp:  97.8 °F (36.6 °C)     TempSrc:  Axillary     SpO2: (!) 88%   95%   Weight:       Height:           Physical Exam:     General Appearance:    Awake and alert, in no acute distress   Head:    Normocephalic, without obvious abnormality   Eyes:          Conjunctivae normal, anicteric sclera   Throat:   No oral lesions, no thrush, oral mucosa moist   Neck:   No adenopathy, supple, no JVD   Lungs:   Mild dyspnea at rest, on nonrebreather   Abdomen:     Soft, non-tender, nondistended   Rectal:     Deferred   Extremities:   no cyanosis   Skin:   No bruising or rash, no jaundice        Results Review:    CBC    Results from last 7 days   Lab Units 10/31/23  0844 10/29/23  2331 10/29/23  0800 10/29/23  0324 10/28/23  1344  10/27/23  0428 10/26/23  0310   WBC 10*3/mm3 39.00* 59.40* 65.20* 60.50* 26.10* 19.40* 21.90*   HEMOGLOBIN g/dL 8.2* 9.1* 9.2* 8.7* 9.4* 7.6* 7.9*   PLATELETS 10*3/mm3 525* 546* 568* 509* 316 250 230     CMP   Results from last 7 days   Lab Units 10/31/23  0844 10/30/23  1212 10/29/23  0121 10/28/23  0625 10/27/23  0428 10/26/23  0310 10/25/23  0527   SODIUM mmol/L 144 148* 142 142 139 141 138   POTASSIUM mmol/L 3.9 4.0 4.1 4.5 3.9 3.6 3.8   CHLORIDE mmol/L 99 103 102 108* 103 103 100   CO2 mmol/L 34.0* 28.0 26.0 23.0 26.0 26.0 24.0   BUN mg/dL 63* 69* 73* 75* 101* 103* 108*   CREATININE mg/dL 1.06* 1.19* 1.15* 0.99 1.27* 1.38* 1.43*   GLUCOSE mg/dL 162* 154* 176* 135* 174* 84 204*   ALBUMIN g/dL 3.3* 3.5 3.2* 3.0* 2.6* 2.8* 2.6*   BILIRUBIN mg/dL 0.2 0.2 0.2 <0.2 <0.2 0.2 <0.2   ALK PHOS U/L 157* 148* 137* 123* 109 118* 118*   AST (SGOT) U/L 20 20 22 18 16 22 22   ALT (SGPT) U/L 11 16 14 16 15 22 22   MAGNESIUM mg/dL 2.6* 2.5* 2.6* 2.7* 2.5* 2.3 2.1     Cr Clearance Estimated Creatinine Clearance: 40.2 mL/min (A) (by C-G formula based on SCr of 1.06 mg/dL (H)).  Coag     HbA1C   Lab Results   Component Value Date    HGBA1C 6.50 (H) 10/13/2023    HGBA1C 6.00 (H) 05/15/2023    HGBA1C 6.9 (H) 08/24/2022     Blood Glucose   Glucose   Date/Time Value Ref Range Status   10/31/2023 0502 178 (H) 70 - 105 mg/dL Final     Comment:     Serial Number: [id number]:  [id number]   10/31/2023 0037 154 (H) 70 - 105 mg/dL Final     Comment:     Serial Number: [id number]:  [id number]   10/30/2023 1823 180 (H) 70 - 105 mg/dL Final     Comment:     Serial Number: [id number]:  [id number]   10/30/2023 1215 149 (H) 70 - 105 mg/dL Final     Comment:     Serial Number: [id number]:  [id number]   10/30/2023 0610 135 (H) 70 - 105 mg/dL Final     Comment:     Serial Number: [id number]:  [id number]   10/29/2023 2346 158 (H) 70 - 105 mg/dL Final     Comment:     Serial Number: [id number]:  [id number]    10/29/2023 1612 171 (H) 70 - 105 mg/dL Final     Comment:     Serial Number: [id number]:  [id number]   10/29/2023 1100 155 (H) 70 - 105 mg/dL Final     Comment:     Serial Number: [id number]:  [id number]     Infection     UA      Radiology(recent) XR Chest 1 View    Result Date: 10/31/2023  Impression: Resolution of right basilar atelectasis. Continued partial atelectasis of the left lower lobe. Electronically Signed: Rosa Melendez MD  10/31/2023 7:15 AM EDT  Workstation ID: [id number]    XR Abdomen KUB    Result Date: 10/30/2023  Impression: Gastric suction tube terminates in the stomach. Electronically Signed: Miguel Trent MD  10/30/2023 11:14 PM EDT  Workstation ID: [id number]    XR Chest 1 View    Result Date: 10/30/2023  Impression: 1. Placement of esophagogastric tube with tip below the diaphragm. 2. Mild bibasilar airspace disease likely atelectasis with small bilateral pleural effusions, unchanged. Electronically Signed: Pavan Amezquita MD  10/30/2023 7:08 AM EDT  Workstation ID: [id number]    XR Abdomen KUB    Result Date: 10/29/2023  Impression: 1. Esophagogastric tube tip overlies the distal stomach. 2. Nonspecific, nonobstructive bowel gas pattern. Electronically Signed: Pavan Amezquita MD  10/29/2023 5:11 PM EDT  Workstation ID: [id number]    XR Abdomen KUB    Result Date: 10/29/2023  Impression: Esophagogastric tube nonvisualized overlying the lower chest or upper abdomen. Recommend repositioning and follow-up KUB to evaluate for position. Electronically Signed: Pavan Amezquita MD  10/29/2023 3:28 PM EDT  Workstation ID: [id number]        Assessment & Plan   Nausea with coffee-ground NG output----vomiting resolved  Elevated LFTs -improved  Normocytic anemia  Acute respiratory failure -likely hospital-acquired pneumonia  Severe lymphocytosis   UTI with Klebsiella pneumoniae/E. Coli  Hypertriglyceridemia  Rhinovirus infection  LEXUS/CKD  DMII  CLL  History of breast cancer  History of hiatal hernia repair  with gastropexy     PLAN:  Patient is an 83-year-old female with history of DMII and hiatal hernia repair with gastropexy who presented 10/10/2023 with complaints of right rib and back pain following a fall.  Hospitalization complicated by acute respiratory failure secondary pneumonia and rhinovirus.  She also had sepsis with UTI.  GI originally consulted for elevated LFTs and now reconsulted for coffee-ground NG output.     Hemoglobin remained stable.  Continue PPI, Carafate and low-dose Reglan.  Okay for tube feeds as tolerated.  No plan for endoscopic evaluation unless life-threatening GI bleed as she is high risk for EGD/sedation.  Worsening O2 needs overnight and now on a nonrebreather.  Continue speech therapy evaluation as indicated.  Little for GI to offer at this time.  Continue supportive care and call if needed.  We will see as needed.    Infectious disease following and currently on meropenem.  Nephrology following with renal function stabilizing.  Hematology following with plan to monitor CBC and transfuse for hemoglobin less than 7.  Plan to check IgG levels.  It appears that palliative care consult has been placed for goals of care.    Electronically signed by SIOMARA Garcia, 10/31/23, 11:12 AM EDT.

## 2023-10-31 NOTE — PLAN OF CARE
Goal Outcome Evaluation:     Patient seen this date for a clinical bedside re-evaluation in the setting of post extubation dysphagia following 2 intubations totaling 12 days. Session conducted in collaboration with PT & OT, who assisted pt to EOB where she remained positioned for all PO trials. Pt switched from 15L nonrebreather to 15L HFNC just prior to exam.      Post extubation dysphonia remains pervasive as pt is primarily aphonic.      Trials of ice chips x6 and water by teaspoon x1. Tolerated initial trials ice chips with no overt clinical s/s aspiration, however clinical measures unreliable given post extubation dysphonia and length of intubations.  Pt did have cough response following trial by tsp and additional 2/4 trials ice chips. She does achieve audible hoarse/strained voice with coughing events only.   Patients O2 sats noted to drop to 91 and 90 on two occasions, as correlated with coughing events but she was able to recover.      Recommend patient remain NPO with modified [place] Water Protocol for bolus size and rate regulation (see below). ST to follow-up to clinically reassess swallow and VFSS candidacy as appropriate. ST POC d/w patient, family and RN who verbalized understanding/agreement.        The rationale to recommend water when a PO diet cannot appropriately/functionally sustain nutrition (or if thickened liquids are prescribed due to aspiration of thin liquids) is because water is low risk for aspiration pna when compared to aspiration of food or other liquids.    Benefits of a water protocol include but are not limited to:      Oral gratification   Engagement of oropharyngeal swallow musculature   Decrease likelihood of dehydration       Guidelines for proper implementation for this patient include:  Thorough oral care prior to consuming water  Upright at 90 degree hip flexion  Small single ice chips at slow rate with 1:1 supervision by nursing staff  Monitor for any changes in  respiratory status and discontinue if distress noted     The [place] Free Water Protocol is a research based protocol established in 1984.

## 2023-10-31 NOTE — PROGRESS NOTES
: the patient sitting in chair chart reviewed still some shortness of breath NG tube in place    Vital Signs  Vitals:    10/31/23 1105   BP:    Pulse:    Resp:    Temp:    SpO2: 95%     I/O this shift:  In: 2062 [Other:1087; NG/GT:975]  Out: -   I/O last 3 completed shifts:  In: -   Out: 2450 [Urine:1350; Emesis/NG output:1100]      Physical Exam:    General: In no acute distress  HEENT:  Normocephalic, Atraumatic, EOMI, PERRLA, NO icterus,  Neck:  Supple, No JVD, No Carotid artery bruit, No lymphadenopathy  Chest: Rhonchi bilateral  Cardiovascular: Regular rate and rhythm. Positive S1 & S2, No rub, murmur or gallop.  Abdominal: Soft, nontender, no palpable organomegaly, no abdominal bruit, positive bowel sounds  Musculoskeletal: No joint tenderness or swelling, good range of motion, Adequate muscle strength on both sides, no cyanosis, clubbing or edema on lower extremities.  Neuro: Alert oriented x3, CN1 - 12 intact, No focal sensory or motor deficit.      Review of Systems:   CNS: Denied headaches, blurred vision, tingling or numbness in any part of body. No weakness or any impaired speech.  CVS: No chest pain or shortness of breath, No orthopnea or PND or exertional dyspnea,  Pulmonary: Denies shortness of breath, coughs, hematemesis, night sweats or weight loss.  GI: Denies diarrhea, nausea, vomiting. Denies weight loss, hematemesis, melena.  : Denies dysuria, frequency or hesitancy of urination  Vascular: Denies claudication, resting pain, tingling numbness or weakness in any part of the body.  Musculoskeletal: Denies Joint tenderness or pain, denies stiffness in joints, denies fever or weight loss, or skin rashes.    Current Labs  [unfilled]  Lab Results (last 24 hours)       Procedure Component Value Units Date/Time    CBC & Differential [[id number]]  (Abnormal) Collected: 10/31/23 0844    Specimen: Blood Updated: 10/31/23 0951    Narrative:      The following orders were created for panel order CBC &  Differential.  Procedure                               Abnormality         Status                     ---------                               -----------         ------                     CBC Auto Differential[[id number]]        Abnormal            Final result               Scan Slide[[id number]]                                       Final result                 Please view results for these tests on the individual orders.    CBC Auto Differential [[id number]]  (Abnormal) Collected: 10/31/23 0844    Specimen: Blood Updated: 10/31/23 0951     WBC 39.00 10*3/mm3      RBC 3.06 10*6/mm3      Hemoglobin 8.2 g/dL      Hematocrit 27.1 %      MCV 88.5 fL      MCH 26.7 pg      MCHC 30.2 g/dL      RDW 15.5 %      RDW-SD 47.3 fl      MPV 8.9 fL      Platelets 525 10*3/mm3     Narrative:      The previously reported component NRBC is no longer being reported. Previous result was 0.1 /100 WBC (Reference Range: 0.0-0.2 /100 WBC) on 10/31/2023 at 0905 EDT.    Scan Slide [[id number]] Collected: 10/31/23 0844    Specimen: Blood Updated: 10/31/23 0951     Scan Slide --     Comment: See Manual Differential Results       Manual Differential [[id number]]  (Abnormal) Collected: 10/31/23 0844    Specimen: Blood Updated: 10/31/23 0951     Neutrophil % 32.0 %      Lymphocyte % 64.0 %      Monocyte % 1.0 %      Eosinophil % 2.0 %      Bands %  1.0 %      Neutrophils Absolute 12.87 10*3/mm3      Lymphocytes Absolute 24.96 10*3/mm3      Monocytes Absolute 0.39 10*3/mm3      Eosinophils Absolute 0.78 10*3/mm3      Anisocytosis Slight/1+     Hypochromia Slight/1+     Smudge Cells Slight/1+     Large Platelets Slight/1+    Narrative:      Reviewed by Pathologist within the past 30 days on [id number] .      Magnesium [[id number]]  (Abnormal) Collected: 10/31/23 0844    Specimen: Blood Updated: 10/31/23 0912     Magnesium 2.6 mg/dL     Comprehensive Metabolic Panel [[id number]]  (Abnormal) Collected: 10/31/23 0844    Specimen: Blood Updated: 10/31/23 0912      Glucose 162 mg/dL      BUN 63 mg/dL      Creatinine 1.06 mg/dL      Sodium 144 mmol/L      Potassium 3.9 mmol/L      Chloride 99 mmol/L      CO2 34.0 mmol/L      Calcium 9.3 mg/dL      Total Protein 6.1 g/dL      Albumin 3.3 g/dL      ALT (SGPT) 11 U/L      AST (SGOT) 20 U/L      Alkaline Phosphatase 157 U/L      Total Bilirubin 0.2 mg/dL      Globulin 2.8 gm/dL      A/G Ratio 1.2 g/dL      BUN/Creatinine Ratio 59.4     Anion Gap 11.0 mmol/L      eGFR 52.6 mL/min/1.73     Narrative:      GFR Normal >60  Chronic Kidney Disease <60  Kidney Failure <15    The GFR formula is only valid for adults with stable renal function between ages 18 and 70.    POC Glucose Once [[id number]]  (Abnormal) Collected: 10/31/23 0502    Specimen: Blood Updated: 10/31/23 0503     Glucose 178 mg/dL      Comment: Serial Number: [id number]:  [id number]       POC Glucose Once [[id number]]  (Abnormal) Collected: 10/31/23 0037    Specimen: Blood Updated: 10/31/23 0039     Glucose 154 mg/dL      Comment: Serial Number: [id number]:  [id number]       POC Glucose Once [[id number]]  (Abnormal) Collected: 10/30/23 1823    Specimen: Blood Updated: 10/30/23 1825     Glucose 180 mg/dL      Comment: Serial Number: [id number]:  [id number]       Magnesium [[id number]]  (Abnormal) Collected: 10/30/23 1212    Specimen: Blood Updated: 10/30/23 1245     Magnesium 2.5 mg/dL     Comprehensive Metabolic Panel [[id number]]  (Abnormal) Collected: 10/30/23 1212    Specimen: Blood Updated: 10/30/23 1245     Glucose 154 mg/dL      BUN 69 mg/dL      Creatinine 1.19 mg/dL      Sodium 148 mmol/L      Potassium 4.0 mmol/L      Chloride 103 mmol/L      CO2 28.0 mmol/L      Calcium 9.6 mg/dL      Total Protein 6.9 g/dL      Albumin 3.5 g/dL      ALT (SGPT) 16 U/L      AST (SGOT) 20 U/L      Alkaline Phosphatase 148 U/L      Total Bilirubin 0.2 mg/dL      Globulin 3.4 gm/dL      A/G Ratio 1.0 g/dL      BUN/Creatinine Ratio 58.0     Anion Gap 17.0 mmol/L       eGFR 45.7 mL/min/1.73     Narrative:      GFR Normal >60  Chronic Kidney Disease <60  Kidney Failure <15    The GFR formula is only valid for adults with stable renal function between ages 18 and 70.    POC Glucose Once [[id number]]  (Abnormal) Collected: 10/30/23 1215    Specimen: Blood Updated: 10/30/23 1217     Glucose 149 mg/dL      Comment: Serial Number: [id number]:  [id number]             Current Radiology  Imaging Results (Last 24 Hours)       Procedure Component Value Units Date/Time    XR Chest 1 View [[id number]] Collected: 10/31/23 0714     Updated: 10/31/23 0717    Narrative:      XR CHEST 1 VW    Date of Exam: 10/31/2023 5:07 AM EDT    Indication: Hypoxia    Comparison: 10/30/2023.    Findings:  NG tube again noted although tip is not included. Right basilar atelectasis has resolved. There is continued partial atelectasis of the left lower lobe. There is stable cardiomegaly.      Impression:      Impression:  Resolution of right basilar atelectasis. Continued partial atelectasis of the left lower lobe.      Electronically Signed: Rosa Melendez MD    10/31/2023 7:15 AM EDT    Workstation ID: [id number]    XR Abdomen KUB [[id number]] Collected: 10/30/23 2314     Updated: 10/30/23 2316    Narrative:      XR ABDOMEN KUB    Date of Exam: 10/30/2023 10:59 PM EDT    Indication: tube placed    Comparison: 10/29/2023.    Findings:  Stable position of gastric suction tube, which terminates well within the stomach. There is gas-filled nondistended bowel. No evidence of pneumoperitoneum.      Impression:      Impression:  Gastric suction tube terminates in the stomach.        Electronically Signed: Miguel Trent MD    10/30/2023 11:14 PM EDT    Workstation ID: [id number]          Current Meds    Current Facility-Administered Medications:     acetaminophen (TYLENOL) tablet 650 mg, 650 mg, Nasogastric, Q4H PRN, Vinicius Heredia DO    amLODIPine (NORVASC) tablet 10 mg, 10 mg, Nasogastric, Q24H, Lucia  Ra DINERO MD, 10 mg at 10/31/23 1002    chlorthalidone (HYGROTON) tablet 25 mg, 25 mg, Nasogastric, Daily, Day, Anita G, APRN, 25 mg at 10/31/23 1000    cyclobenzaprine (FLEXERIL) tablet 10 mg, 10 mg, Nasogastric, TID PRN, Vinicius Heredia DO, 10 mg at 10/28/23 1731    dextrose (D50W) (25 g/50 mL) IV injection 25 g, 25 g, Intravenous, Q15 Min PRN, Kalyan Mckeon MD, 25 g at 10/26/23 0614    dextrose (D50W) (25 g/50 mL) IV injection 50 mL, 50 mL, Intravenous, Q1H PRN, Ra Myers MD, 50 mL at 10/14/23 1235    dextrose (GLUTOSE) oral gel 15 g, 15 g, Nasogastric, Q15 Min PRN, Vinicius Heredia DO    Enoxaparin Sodium (LOVENOX) syringe 40 mg, 40 mg, Subcutaneous, Daily, Foreign Bolton MD, 40 mg at 10/28/23 1635    Ferrous Sulfate 300 (60 Fe) MG/5ML solution 300 mg, 300 mg, Nasogastric, Once per day on Mon Wed Fri, Ra Myers MD, 300 mg at 10/30/23 1008    glucagon (GLUCAGEN) injection 1 mg, 1 mg, Intramuscular, Q15 Min PRN, Kalyan Mckeon MD    hydrALAZINE (APRESOLINE) injection 10 mg, 10 mg, Intravenous, Q6H PRN, Ramona Wilder APRN, 10 mg at 10/29/23 0624    HYDROmorphone (DILAUDID) injection 0.5 mg, 0.5 mg, Intravenous, Q3H PRN, Penny Tomlinson APRN, 0.5 mg at 10/30/23 2012    insulin lispro (HUMALOG/ADMELOG) injection 4-24 Units, 4-24 Units, Subcutaneous, Q6H, Vinicius Heredia DO, 4 Units at 10/30/23 1824    ipratropium-albuterol (DUO-NEB) nebulizer solution 3 mL, 3 mL, Nebulization, Q6H PRN, Day, Anita G, APRN, 3 mL at 10/30/23 2330    lansoprazole (PREVACID SOLUTAB) disintegrating tablet Tablet Delayed Release Dispersible 30 mg, 30 mg, Nasogastric, Q AM, Day, Anita G, APRN, 30 mg at 10/31/23 0657    levothyroxine (SYNTHROID, LEVOTHROID) tablet 100 mcg, 100 mcg, Nasogastric, Q AM, Day, Dawn G, APRN, 100 mcg at 10/31/23 0657    lidocaine (XYLOCAINE) 5 % ointment, , , PRN, Vinicius Heredia, DO, 1 application  at 10/18/23 1449    LORazepam (ATIVAN) injection 0.5 mg,  0.5 mg, Intravenous, Q6H PRN, Ra Myers MD    losartan (COZAAR) tablet 25 mg, 25 mg, Oral, Q24H, Ra Myers MD    metoclopramide (REGLAN) tablet 5 mg, 5 mg, Nasogastric, Q8H, Ra Myers MD, 5 mg at 10/31/23 1001    nitroglycerin (NITROSTAT) SL tablet 0.4 mg, 0.4 mg, Sublingual, Q5 Min PRN, Kalyan Mckeon MD    ondansetron (ZOFRAN) injection 4 mg, 4 mg, Intravenous, Q6H PRN, Kalyan Mckeon MD, 4 mg at 10/31/23 0734    oxyCODONE (ROXICODONE) immediate release tablet 5 mg, 5 mg, Nasogastric, Q6H PRN, Vinicius Heredia DO, 5 mg at 10/30/23 1759    pantoprazole (PROTONIX) injection 40 mg, 40 mg, Intravenous, Q12H, Fritz Archibald MD, 40 mg at 10/31/23 1000    polyethylene glycol (MIRALAX) packet 17 g, 17 g, Oral, Daily PRN, Foreign Bolton MD, 17 g at 10/30/23 2313    rOPINIRole (REQUIP) tablet 0.25 mg, 0.25 mg, Nasogastric, Nightly, Foreign Bolton MD, 0.25 mg at 10/30/23 2009    rosuvastatin (CRESTOR) tablet 10 mg, 10 mg, Nasogastric, Nightly, Day, Anita G, APRN, 10 mg at 10/30/23 2009    sennosides-docusate (PERICOLACE) 8.6-50 MG per tablet 2 tablet, 2 tablet, Nasogastric, BID, Foreign Bolton MD, 2 tablet at 10/31/23 1000    sertraline (ZOLOFT) tablet 150 mg, 150 mg, Nasogastric, Daily, Foreign Bolton MD, 150 mg at 10/31/23 1000    [COMPLETED] Insert Peripheral IV, , , Once **AND** sodium chloride 0.9 % flush 10 mL, 10 mL, Intravenous, PRN, Sheree Field PA-C    sodium chloride 0.9 % flush 10 mL, 10 mL, Intravenous, Q12H, Kalyan Mckeon MD, 10 mL at 10/31/23 1001    sodium chloride 0.9 % flush 10 mL, 10 mL, Intravenous, PRN, Kalyan Mckeon MD    sodium chloride 0.9 % flush 10 mL, 10 mL, Intravenous, Q12H, Day, Anita G, APRN, 10 mL at 10/31/23 1001    sodium chloride 0.9 % flush 10 mL, 10 mL, Intravenous, PRN, Day, Anita G, APRN    sodium chloride 0.9 % infusion 40 mL, 40 mL, Intravenous, PRN, Kalyan Mckeon MD    sodium chloride 0.9 % infusion 40 mL, 40 mL,  Intravenous, PRN, Day, Anita G, APRN    sucralfate (CARAFATE) tablet 1 g, 1 g, Nasogastric, 4x Daily AC & at Bedtime, Ra Myers MD, 1 g at 10/31/23 1000        Assessment and plan:        Acute hypoxemic respiratory failure    Mixed anxiety and depressive disorder    Primary osteoarthritis involving multiple joints    Hiatal hernia with gastroesophageal reflux    Mixed hyperlipidemia    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    Acquired hypothyroidism    Type 2 diabetes mellitus with stage 3b chronic kidney disease, without long-term current use of insulin    Insomnia    Overactive bladder    Vitamin B 12 deficiency    Vitamin D deficiency    LIBBY (obstructive sleep apnea)    COPD (chronic obstructive pulmonary disease)    CKD (chronic kidney disease) stage 3, GFR 30-59 ml/min    History of breast cancer    Personal history of CLL (chronic lymphocytic leukemia)    History of cigarette smoking    Fall at home    Rhinovirus infection    Acute respiratory failure    Hyperkalemia    Respiratory failure  Acute on chronic kidney injury creatinine close to baseline continue current supportive care  Pneumonia sepsis and UTI continue antibiotic therapy  Hypernatremia start free water through the NG tube        Unique Luna MD FACP, FASN.  10/31/23  11:45 EDT

## 2023-10-31 NOTE — PROGRESS NOTES
Infectious Diseases Progress Note      LOS: 21 days   Patient Care Team:  Asia Queen APRN as PCP - General (Nurse Practitioner)  Asia Queen APRN as Nurse Practitioner (Nurse Practitioner)  Ling Bolden MD as Consulting Physician (Hematology and Oncology)    Chief Complaint: Intubated on the ventilator    Subjective     The patient had no high-grade fever during the last 24 hours.  Patient is currently on 10 L cannula via nasal cannula and is alert and awake.  The patient remained hemodynamically stable    Review of Systems:   Review of Systems   Unable to perform ROS: Acuity of condition   Constitutional:  Positive for fatigue.   Respiratory:  Positive for cough and shortness of breath.         Objective     Vital Signs  Temp:  [97.4 °F (36.3 °C)-98.4 °F (36.9 °C)] 97.4 °F (36.3 °C)  Heart Rate:  [69-81] 69  Resp:  [19-23] 19  BP: (116-149)/(47-88) 116/88    Physical Exam:  Physical Exam  Vitals and nursing note reviewed.   Constitutional:       Appearance: She is ill-appearing.   HENT:      Head: Normocephalic and atraumatic.   Eyes:      Conjunctiva/sclera: Conjunctivae normal.      Pupils: Pupils are equal, round, and reactive to light.   Cardiovascular:      Rate and Rhythm: Normal rate and regular rhythm.   Pulmonary:      Effort: Respiratory distress present.      Breath sounds: Rales present.   Abdominal:      General: Abdomen is flat. Bowel sounds are normal.      Palpations: Abdomen is soft.      Comments: NG tube   Genitourinary:     Comments: External catheter  Musculoskeletal:      Cervical back: Neck supple.      Right lower leg: No edema.      Left lower leg: No edema.   Neurological:      Mental Status: She is alert.      Comments: Alert and awake          Results Review:    I have reviewed all clinical data, test, lab, and imaging results.     Radiology  XR Chest 1 View    Result Date: 10/31/2023  XR CHEST 1 VW Date of Exam: 10/31/2023 5:07 AM EDT Indication: Hypoxia  Comparison: 10/30/2023. Findings: NG tube again noted although tip is not included. Right basilar atelectasis has resolved. There is continued partial atelectasis of the left lower lobe. There is stable cardiomegaly.     Impression: Resolution of right basilar atelectasis. Continued partial atelectasis of the left lower lobe. Electronically Signed: Rosa Melendez MD  10/31/2023 7:15 AM EDT  Workstation ID: [id number]    XR Abdomen KUB    Result Date: 10/30/2023  XR ABDOMEN KUB Date of Exam: 10/30/2023 10:59 PM EDT Indication: tube placed Comparison: 10/29/2023. Findings: Stable position of gastric suction tube, which terminates well within the stomach. There is gas-filled nondistended bowel. No evidence of pneumoperitoneum.     Impression: Gastric suction tube terminates in the stomach. Electronically Signed: Miguel Trent MD  10/30/2023 11:14 PM EDT  Workstation ID: [id number]     Cardiology    Laboratory    Results from last 7 days   Lab Units 10/31/23  0844 10/29/23  2331 10/29/23  0800 10/29/23  0324 10/28/23  1344 10/27/23  0428 10/26/23  0310   WBC 10*3/mm3 39.00* 59.40* 65.20* 60.50* 26.10* 19.40* 21.90*   HEMOGLOBIN g/dL 8.2* 9.1* 9.2* 8.7* 9.4* 7.6* 7.9*   HEMATOCRIT % 27.1* 29.4* 30.8* 29.3* 29.9* 24.9* 25.7*   PLATELETS 10*3/mm3 525* 546* 568* 509* 316 250 230     Results from last 7 days   Lab Units 10/31/23  0844 10/30/23  1212 10/29/23  0121 10/28/23  0625 10/27/23  0428 10/26/23  0310 10/25/23  0527   SODIUM mmol/L 144 148* 142 142 139 141 138   POTASSIUM mmol/L 3.9 4.0 4.1 4.5 3.9 3.6 3.8   CHLORIDE mmol/L 99 103 102 108* 103 103 100   CO2 mmol/L 34.0* 28.0 26.0 23.0 26.0 26.0 24.0   BUN mg/dL 63* 69* 73* 75* 101* 103* 108*   CREATININE mg/dL 1.06* 1.19* 1.15* 0.99 1.27* 1.38* 1.43*   GLUCOSE mg/dL 162* 154* 176* 135* 174* 84 204*   ALBUMIN g/dL 3.3* 3.5 3.2* 3.0* 2.6* 2.8* 2.6*   BILIRUBIN mg/dL 0.2 0.2 0.2 <0.2 <0.2 0.2 <0.2   ALK PHOS U/L 157* 148* 137* 123* 109 118* 118*   AST (SGOT) U/L 20 20 22  18 16 22 22   ALT (SGPT) U/L 11 16 14 16 15 22 22   CALCIUM mg/dL 9.3 9.6 9.3 9.0 8.5* 9.0 9.0                 Microbiology   Microbiology Results (last 10 days)       Procedure Component Value - Date/Time    Respiratory Panel PCR w/COVID-19(SARS-CoV-2) LESLEE/VIVIANE/PAVAN/PAD/COR/MAD/JUAN DAVID In-House, NP Swab in UTM/VTM, 3-4 HR TAT - Swab, Nasopharynx [[id number]]  (Normal) Collected: 10/29/23 1113    Lab Status: Final result Specimen: Swab from Nasopharynx Updated: 10/29/23 1218     ADENOVIRUS, PCR Not Detected     Coronavirus 229E Not Detected     Coronavirus HKU1 Not Detected     Coronavirus NL63 Not Detected     Coronavirus OC43 Not Detected     COVID19 Not Detected     Human Metapneumovirus Not Detected     Human Rhinovirus/Enterovirus Not Detected     Influenza A PCR Not Detected     Influenza B PCR Not Detected     Parainfluenza Virus 1 Not Detected     Parainfluenza Virus 2 Not Detected     Parainfluenza Virus 3 Not Detected     Parainfluenza Virus 4 Not Detected     RSV, PCR Not Detected     Bordetella pertussis pcr Not Detected     Bordetella parapertussis PCR Not Detected     Chlamydophila pneumoniae PCR Not Detected     Mycoplasma pneumo by PCR Not Detected    Narrative:      In the setting of a positive respiratory panel with a viral infection PLUS a negative procalcitonin without other underlying concern for bacterial infection, consider observing off antibiotics or discontinuation of antibiotics and continue supportive care. If the respiratory panel is positive for atypical bacterial infection (Bordetella pertussis, Chlamydophila pneumoniae, or Mycoplasma pneumoniae), consider antibiotic de-escalation to target atypical bacterial infection.    Blood Culture - Blood, Hand, Left [[id number]]  (Normal) Collected: 10/23/23 1612    Lab Status: Final result Specimen: Blood from Hand, Left Updated: 10/28/23 1631     Blood Culture No growth at 5 days    Blood Culture - Blood, Arm, Left [[id number]]  (Normal) Collected:  10/23/23 1611    Lab Status: Final result Specimen: Blood from Arm, Left Updated: 10/28/23 1631     Blood Culture No growth at 5 days    Respiratory Culture - Aspirate, ET Suction [[id number]]  (Abnormal)  (Susceptibility) Collected: 10/23/23 1356    Lab Status: Final result Specimen: Aspirate from ET Suction Updated: 10/25/23 0758     Respiratory Culture Heavy growth (4+) Klebsiella aerogenes      No Normal Respiratory Leanna     Gram Stain Less than 10 Epithelial cells per low power field      Moderate (3+) WBCs seen      Many (4+) Gram negative bacilli      Occasional Gram positive cocci in clusters      Rare (1+) Yeast    Susceptibility        Klebsiella aerogenes      JUAN      Cefepime Susceptible      Ceftazidime Resistant      Ceftriaxone Resistant      Gentamicin Susceptible      Levofloxacin Susceptible      Piperacillin + Tazobactam Resistant      Tetracycline Susceptible      Trimethoprim + Sulfamethoxazole Susceptible                       Susceptibility Comments       Klebsiella aerogenes    Cefazolin sensitivity will not be reported for Enterobacteriaceae in non-urine isolates. If cefazolin is preferred, please call the microbiology lab to request an E-test.  With the exception of urinary-sourced infections, aminoglycosides should not be used as monotherapy.                       Medication Review:       Schedule Meds  amLODIPine, 10 mg, Nasogastric, Q24H  chlorthalidone, 25 mg, Nasogastric, Daily  enoxaparin, 40 mg, Subcutaneous, Daily  Ferrous Sulfate, 300 mg, Nasogastric, Once per day on Mon Wed Fri  insulin lispro, 4-24 Units, Subcutaneous, Q6H  lansoprazole, 30 mg, Nasogastric, Q AM  levothyroxine, 100 mcg, Nasogastric, Q AM  losartan, 25 mg, Oral, Q24H  metoclopramide, 5 mg, Nasogastric, Q8H  pantoprazole, 40 mg, Intravenous, Q12H  rOPINIRole, 0.25 mg, Nasogastric, Nightly  rosuvastatin, 10 mg, Nasogastric, Nightly  senna-docusate sodium, 2 tablet, Nasogastric, BID  sertraline, 150 mg, Nasogastric,  Daily  sodium chloride, 10 mL, Intravenous, Q12H  sodium chloride, 10 mL, Intravenous, Q12H  sucralfate, 1 g, Nasogastric, 4x Daily AC & at Bedtime        Infusion Meds         PRN Meds    acetaminophen    cyclobenzaprine    dextrose    dextrose    dextrose    glucagon (human recombinant)    hydrALAZINE    HYDROmorphone    ipratropium-albuterol    lidocaine    LORazepam    nitroglycerin    ondansetron    oxyCODONE    polyethylene glycol    [COMPLETED] Insert Peripheral IV **AND** sodium chloride    sodium chloride    sodium chloride    sodium chloride    sodium chloride        Assessment & Plan       Antimicrobial Therapy   1.  IV meropenem       2.        3.        4.        5.            Assessment     Acute respiratory failure.  Most likely secondary to hospital-acquired pneumonia.  CT scan of the chest on October 18 showed bilateral lower lobe infiltrates.  There was no additional findings consistent with PCP pneumonia  Patient is s/p bronchoscopy and BAL cultures are negative  MRSA screen was negative  -Patient is now extubated and on 10 L of oxygen by nasal cannula     Severe lymphocytosis.  Most likely secondary to underlying CLL.  Patient was not on treatment prior to admission.  Improving     History of breast cancer s/p mastectomies in the past     UTI with Klebsiella pneumoniae and E. coli.  Organisms were relatively susceptible     Diabetes mellitus type 2     Rhinovirus infection.  Nasal swab was positive for rhinovirus PCR    Mild septic shock-currently off vasopressors    Possible ventilator associated pneumonia.  Tracheal aspirate culture from October 23, 2023 grew Klebsiella aerogenes.  Based on the susceptibility I am suspecting AmpC  pathogen     Plan  Patient received 10 days of IV Merrem which ended on 10/31/2023    Monitor off antimicrobial therapy  Continue supportive care  A.m. labs  Case discussed with patient's family at bedside      SIOMARA Sanchez  10/31/23  17:00  EDT    Note is dictated utilizing voice recognition software/Dragon

## 2023-10-31 NOTE — PROGRESS NOTES
[facility] Medicine Services   Daily Progress Note    Patient Name: Diane Guan  : 1940  MRN: [id number]  Primary Care Physician:  Asia Queen, SIOMARA  Date of admission: 10/10/2023    Subjective      Chief Complaint: Patient came in after a fall with right rib and right-sided back pain.     History of Present Illness: Diane Guan is a 83 y.o. female with past medical history of DM 2, hypertension and breast cancer who presented to [facility] on 10/10/2023 complaining of fall in the bathroom today with hitting her right side of the rib cage and right back with pain.  She says that her right leg has a little problem giving away at times and gave her while she was in the restroom today and she fell hitting her right side of the chest wall and right back on the commode.  She had pretty significant pain and that is what brought her here.  She says that she had some cough going on for about 3 weeks and was given a Z-Erick with which her cough is getting better.  She denies any fever chills shortness of breath nausea vomiting diarrhea constipation abdominal pain hematemesis hematochezia melena hemoptysis dysuria or hematuria.  Patient received a dose of Dilaudid in the ER and then her oxygen saturation dropped and she had to be started on 2 L of oxygen by nasal cannula.  I believe the Dilaudid caused some respiratory depression on top of her difficulty to take a deep breath anyways because of the pain secondary to fall has caused this acute respiratory failure.     10/29/2023 patient seen and examined in bed no acute distress, family at bedside, blood pressure 180/72.  Dyspnea on 15 L.  We were able to decrease oxygen to 8 L saturation remained 94%.  Discussed with RN.  Tolerating antibiotics.  Afebrile,  10/30/2023 patient seen and examined in bed  patient with significant dyspnea on 10 L, dysphagia, now with NG tube.  Discussed with RN.  Palliative care consulted.  Patient now  DNR.  10/31/23 patient seen and examined in bed acute distress, having significant dyspnea on 15 L of oxygen, vital signs stable, family at bedside, discussed with RN.  Creatinine 1.06    Review of Systems   Constitutional: Negative for chills, fever and night sweats.   HENT:  Positive for congestion. Negative for hoarse voice and sore throat.    Eyes:  Negative for blurred vision and double vision.   Cardiovascular:  Positive for chest pain. Negative for dyspnea on exertion, leg swelling, near-syncope, orthopnea, palpitations and syncope.   Respiratory:  Positive for cough. Negative for hemoptysis, shortness of breath, sputum production and wheezing.    Endocrine: Negative for cold intolerance and heat intolerance.   Skin:  Negative for itching and rash.   Musculoskeletal:  Positive for back pain and myalgias. Negative for neck pain and stiffness.        Patient had a fall today and last fall was about a year ago.   Gastrointestinal:  Negative for abdominal pain, constipation, diarrhea, hematemesis, hematochezia, melena, nausea and vomiting.   Genitourinary:  Negative for dysuria, frequency, hematuria and urgency.   Neurological:  Negative for excessive daytime sleepiness, dizziness, focal weakness, headaches, light-headedness, loss of balance and weakness.   Psychiatric/Behavioral:  Negative for altered mental status, depression and hallucinations.       Objective      Vitals:   Temp:  [97.5 °F (36.4 °C)-98.4 °F (36.9 °C)] 97.8 °F (36.6 °C)  Heart Rate:  [69-87] 69  Resp:  [18-23] 22  BP: (116-187)/(47-72) 149/59  Flow (L/min):  [7-15] 15    Physical Exam     Constitutional:       General: She is awake. She is not in acute distress.     Appearance: Normal appearance.   HENT:      Mouth/Throat:      Mouth: Mucous membranes are moist.      Pharynx: Oropharynx is clear.   Eyes:      General: No scleral icterus.     Extraocular Movements: Extraocular movements intact.      Conjunctiva/sclera: Conjunctivae normal.    Cardiovascular:      Rate and Rhythm: Normal rate.      Heart sounds: No murmur heard.     No gallop.   Pulmonary:      Breath sounds: Rales (minimal bibasilar) present. No wheezing.   Abdominal:      General: Bowel sounds are normal.      Palpations: Abdomen is soft.      Tenderness: There is no abdominal tenderness.   Musculoskeletal:         General: No tenderness.      Right lower leg: No edema.      Left lower leg: No edema.   Neurological:      Mental Status: She is alert.      Comments: Alert, Awake, oriented to place and person. Generalized weakness   Psychiatric:         Behavior: Behavior is cooperative.          Result Review    Result Review:  I have personally reviewed the results from the time of this admission to 10/31/2023 10:52 EDT and agree with these findings:  [x]  Laboratory  [x]  Microbiology  [x]  Radiology  []  EKG/Telemetry   []  Cardiology/Vascular   []  Pathology  []  Old records  []  Other:  CMP:        Lab 10/31/23  0844 10/30/23  1212 10/29/23  0121 10/28/23  0625 10/27/23  0428   SODIUM 144 148* 142 142 139   POTASSIUM 3.9 4.0 4.1 4.5 3.9   CHLORIDE 99 103 102 108* 103   CO2 34.0* 28.0 26.0 23.0 26.0   ANION GAP 11.0 17.0* 14.0 11.0 10.0   BUN 63* 69* 73* 75* 101*   CREATININE 1.06* 1.19* 1.15* 0.99 1.27*   EGFR 52.6* 45.7* 47.7* 57.0* 42.3*   GLUCOSE 162* 154* 176* 135* 174*   CALCIUM 9.3 9.6 9.3 9.0 8.5*   MAGNESIUM 2.6* 2.5* 2.6* 2.7* 2.5*   TOTAL PROTEIN 6.1 6.9 6.3 5.5* 5.2*   ALBUMIN 3.3* 3.5 3.2* 3.0* 2.6*   GLOBULIN 2.8 3.4 3.1 2.5 2.6   ALT (SGPT) 11 16 14 16 15   AST (SGOT) 20 20 22 18 16   BILIRUBIN 0.2 0.2 0.2 <0.2 <0.2   ALK PHOS 157* 148* 137* 123* 109    CBC:      Lab 10/31/23  0844 10/29/23  2331 10/29/23  0800 10/29/23  0324 10/28/23  1344 10/27/23  0428 10/26/23  0310   WBC 39.00* 59.40* 65.20* 60.50* 26.10* 19.40* 21.90*   HEMOGLOBIN 8.2* 9.1* 9.2* 8.7* 9.4* 7.6* 7.9*   HEMATOCRIT 27.1* 29.4* 30.8* 29.3* 29.9* 24.9* 25.7*   PLATELETS 525* 546* 568* 509* 316 250 230    NEUTROS ABS 12.87* 10.69* 11.74* 24.81* 11.22* 6.60 7.45*   EOS ABS 0.78* 1.19*  --   --  0.52* 0.97* 0.66*   MCV 88.5 86.0 88.1 88.1 87.0 88.9 87.2        Wounds (last 24 hours)               Assessment & Plan      Brief Patient Summary:  Diane Guan is a 83 y.o. female with PMH of diabetes, hypertension, breast cancer presented to the hospital after a fall and was admitted with a principal diagnosis of Acute hypoxemic respiratory failure.  On admission, patient required 2 L nasal cannula and subsequently had worsening oxygenation with hypercapnic respiratory failure.  Failed BiPAP and subsequently was intubated on 10/15.  Initially treated with broad-spectrum antibiotics and ID was consulted.  RVP positive for rhinovirus, sputum cultures were negative.  Treated with diuretics for fluid overload.  Developed profound hypoxia and needed deep sedation.  Had bronch on 10/18.  Oxygenation improved with aggressive diuretics.  Sputum cultures grew Klebsiella and treated with meropenem.  Subsequently, extubated on 10/25 but developed hypoxia afterwards, failed BiPAP and had to be reintubated.  Also had LEXUS on admission, nephrology was consulted.  Renal ultrasound with no obstruction.  Treated with diuretics.  Subsequently, urine cultures grew both E. coli and Klebsiella pneumonia.  Treated with Zosyn for 7 days. During the hospital course, patient also developed elevated liver enzymes. Gallbladder ultrasound showed dilated common bile duct measuring 14 mm with distended gallbladder and sludge.  GI was consulted and recommended conservative management.  Liver enzymes subsequently normalized.  Subsequently, family decided on DNR/DNI prior to extubation on 10/27.  Did well with BiPAP.        amLODIPine, 10 mg, Nasogastric, Q24H  chlorthalidone, 25 mg, Nasogastric, Daily  enoxaparin, 40 mg, Subcutaneous, Daily  Ferrous Sulfate, 300 mg, Nasogastric, Once per day on Mon Wed Fri  insulin lispro, 4-24 Units, Subcutaneous,  Q6H  lansoprazole, 30 mg, Nasogastric, Q AM  levothyroxine, 100 mcg, Nasogastric, Q AM  losartan, 25 mg, Oral, Q24H  metoclopramide, 5 mg, Nasogastric, Q8H  pantoprazole, 40 mg, Intravenous, Q12H  rOPINIRole, 0.25 mg, Nasogastric, Nightly  rosuvastatin, 10 mg, Nasogastric, Nightly  senna-docusate sodium, 2 tablet, Nasogastric, BID  sertraline, 150 mg, Nasogastric, Daily  sodium chloride, 10 mL, Intravenous, Q12H  sodium chloride, 10 mL, Intravenous, Q12H  sucralfate, 1 g, Nasogastric, 4x Daily AC & at Bedtime             Active Hospital Problems:  Active Hospital Problems    Diagnosis     **Acute hypoxemic respiratory failure     Respiratory failure     Hyperkalemia     Acute respiratory failure     History of breast cancer     Personal history of CLL (chronic lymphocytic leukemia)     History of cigarette smoking     Fall at home     Rhinovirus infection     CKD (chronic kidney disease) stage 3, GFR 30-59 ml/min     COPD (chronic obstructive pulmonary disease)     LIBBY (obstructive sleep apnea)     Vitamin D deficiency     Vitamin B 12 deficiency     Overactive bladder     Primary osteoarthritis involving multiple joints     Mixed hyperlipidemia     Mixed anxiety and depressive disorder     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  Possible contribution from rhinovirus infection with resultant ARDS.  COPD: Acute on chronic.  Not on any home medications.  Bronchodilators as needed.  Failed extubation and had to be reintubated on 10/25.  Likely due to a combination of COPD, sleep apnea, deconditioning and ARDS.  Respiratory viral panel negative  Respiratory cultures  show heavy growth of Klebsiella sensitive to cefepime  Infectious is consulted.  Recommended to continue cefepime for 7 days  Extubated now off BiPAP on 10/27, improving.  Wean off oxygen as tolerated.    scheduled AVAPS during the nighttime and as needed during the daytime.     Septic shock due to UTI / E. coli and Klebsiella bacteremia  RVP: +Rhinovirus  BAL cultures were negative.  Sputum cultures from 10/23 with Klebsiella.  ID following, currently on meropenem.  Shock state has resolved.     Acute on chronic heart failure with preserved EF:   Currently well compensated.  Last ECHO showed an EF of 70% with indeterminate LV diastolic function.   On intermittent diuretics, nephrology managing the diuretic regimen.  Monitor Input/Output very closely. Follow daily weights.   Net IO Since Admission: 6,948.95 mL [10/28/23 1112]     Transaminitis with dilated common bile duct  Gallbladder ultrasound with dilated CBD, measuring 14 mm with gall sludge.  GI following.  Liver enzymes normalized.  GI originally consulted for elevated LFTs and reconsulted for coffee-ground NG output.  Hemoglobin 9.1, 9.2 yesterday.    Coffee-ground emesis with NG placement overnight and daughter at bedside reports coffee-ground emesis during admission as well.  Unfortunately, she is a poor candidate for sedation/EGD at this time as she would likely require intubation and she is currently a DNI.  We will start twice daily PPI, Carafate 4 times daily and low-dose Reglan.  Okay for tube feeds as tolerated per dietitian recommendation.  We will monitor hemoglobin.  Plan EGD for life-threatening GI bleed only at this time.  Monitor hemoglobin and transfuse as needed     Acute toxic / metabolic encephalopathy  CT head on 10/14 with no acute pathology.  Likely due to residual effects of deep sedation for multiple days.  Some ICU delirium as well.  Mentation improved significantly.     Acute kidney injury on CKD stage III-non oliguric.  Baseline  creatinine of 1.3.  Likely ATN from septic shock.  Nephrology following, on intermittent diuretics.  Monitor Input/Output very closely.   Net IO Since Admission: 6,948.95 mL [10/28/23 1112]     Diabetes mellitus Type 2, not insulin-dependent : well controlled.   Hold home metformin.    Accu checks every 6 hours and c/w humalog coverage as needed.   Last A1c of 6.5.     Essential Hypertension: well controlled.   Resume home chlorthalidone.  Norvasc, Cozaar and irbesartan.  Restart medications as needed.     Iron deficiency anemia: On ferrous sulfate.  Some drop in hemoglobin with FOBT positive.  No gross bleeding, no need for emergent EGD/colonoscopy at this time.  Awaiting CBC from 10/28.     Primary hypothyroidism: Chronic and stable. Continue synthroid.    Dyslipidemia: Chronic and stable.  Continue with statins.    Breast cancer, s/p right mastectomy: Resume home anastrozole when able to swallow pills.    Chronic lymphocytic leukemia>   -oncology n consulting. This was first diagnosed at the end of 2022 and she has not needed treatment since the diagnosis. Her condition is complicated at this time by the severity of the present illness. The sudden and marked increase in the white cell count most likely does not represent worsening of the malignant disease but rather is likely connected to the present illness. At this time, other than additional investigations and support I don't believe she needs intervention. One strong possibility is of hemolytic anemia and I will investigate. 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     Falls at home/functional decline-PT OT likely need nursing home discharge.    Restless leg syndrome: Continue with Requip.    Anxiety/depression: Continue Zoloft    Severe obstructive sleep apnea: API of 34.5.  Not sure if she uses any home CPAP.  Refused AVAPS in the  past.    DVT prophylaxis:  Medical and mechanical DVT prophylaxis orders are present.    CODE STATUS:    Medical Intervention Limits: NO intubation (DNI)  Code Status (Patient has no pulse and is not breathing): No CPR (Do Not Attempt to Resuscitate)  Medical Interventions (Patient has pulse or is breathing): Limited Support  Comments: Spoke with daughter and  at the bedside on 10/27/2023    Disposition:  .  PT OT likely needs nursing home on discharge.    Electronically signed by Ra Myers MD, 10/31/23, 10:52 EDT.  [facility] Hospitalist Team

## 2023-11-01 ENCOUNTER — APPOINTMENT (OUTPATIENT)
Dept: GENERAL RADIOLOGY | Facility: HOSPITAL | Age: 83
DRG: 207 | End: 2023-11-01
Payer: MEDICARE

## 2023-11-01 LAB
ALBUMIN SERPL-MCNC: 3.2 G/DL (ref 3.5–5.2)
ALBUMIN/GLOB SERPL: 1.1 G/DL
ALP SERPL-CCNC: 134 U/L (ref 39–117)
ALT SERPL W P-5'-P-CCNC: 12 U/L (ref 1–33)
ANION GAP SERPL CALCULATED.3IONS-SCNC: 10 MMOL/L (ref 5–15)
ANISOCYTOSIS BLD QL: ABNORMAL
AST SERPL-CCNC: 18 U/L (ref 1–32)
BILIRUB SERPL-MCNC: 0.2 MG/DL (ref 0–1.2)
BUN SERPL-MCNC: 65 MG/DL (ref 8–23)
BUN/CREAT SERPL: 65.7 (ref 7–25)
CALCIUM SPEC-SCNC: 9.1 MG/DL (ref 8.6–10.5)
CHLORIDE SERPL-SCNC: 95 MMOL/L (ref 98–107)
CO2 SERPL-SCNC: 31 MMOL/L (ref 22–29)
CREAT SERPL-MCNC: 0.99 MG/DL (ref 0.57–1)
DEPRECATED RDW RBC AUTO: 50.3 FL (ref 37–54)
EGFRCR SERPLBLD CKD-EPI 2021: 57 ML/MIN/1.73
EOSINOPHIL # BLD MANUAL: 1.13 10*3/MM3 (ref 0–0.4)
EOSINOPHIL NFR BLD MANUAL: 3 % (ref 0.3–6.2)
ERYTHROCYTE [DISTWIDTH] IN BLOOD BY AUTOMATED COUNT: 15.6 % (ref 12.3–15.4)
GLOBULIN UR ELPH-MCNC: 2.8 GM/DL
GLUCOSE BLDC GLUCOMTR-MCNC: 120 MG/DL (ref 70–105)
GLUCOSE BLDC GLUCOMTR-MCNC: 164 MG/DL (ref 70–105)
GLUCOSE BLDC GLUCOMTR-MCNC: 179 MG/DL (ref 70–105)
GLUCOSE SERPL-MCNC: 98 MG/DL (ref 65–99)
HCT VFR BLD AUTO: 29.1 % (ref 34–46.6)
HGB BLD-MCNC: 8.8 G/DL (ref 12–15.9)
HOLD SPECIMEN: NORMAL
HYPOCHROMIA BLD QL: ABNORMAL
LYMPHOCYTES # BLD MANUAL: 34.22 10*3/MM3 (ref 0.7–3.1)
LYMPHOCYTES NFR BLD MANUAL: 1 % (ref 5–12)
MAGNESIUM SERPL-MCNC: 2.5 MG/DL (ref 1.6–2.4)
MCH RBC QN AUTO: 26.4 PG (ref 26.6–33)
MCHC RBC AUTO-ENTMCNC: 30.3 G/DL (ref 31.5–35.7)
MCV RBC AUTO: 87.3 FL (ref 79–97)
MONOCYTES # BLD: 0.38 10*3/MM3 (ref 0.1–0.9)
NEUTROPHILS # BLD AUTO: 1.88 10*3/MM3 (ref 1.7–7)
NEUTROPHILS NFR BLD MANUAL: 5 % (ref 42.7–76)
OVALOCYTES BLD QL SMEAR: ABNORMAL
PLAT MORPH BLD: NORMAL
PLATELET # BLD AUTO: 483 10*3/MM3 (ref 140–450)
PMV BLD AUTO: 9 FL (ref 6–12)
POLYCHROMASIA BLD QL SMEAR: ABNORMAL
POTASSIUM SERPL-SCNC: 4.4 MMOL/L (ref 3.5–5.2)
PROT SERPL-MCNC: 6 G/DL (ref 6–8.5)
RBC # BLD AUTO: 3.33 10*6/MM3 (ref 3.77–5.28)
SCAN SLIDE: NORMAL
SMUDGE CELLS BLD QL SMEAR: ABNORMAL
SODIUM SERPL-SCNC: 136 MMOL/L (ref 136–145)
VARIANT LYMPHS NFR BLD MANUAL: 91 % (ref 19.6–45.3)
WBC NRBC COR # BLD: 37.6 10*3/MM3 (ref 3.4–10.8)

## 2023-11-01 PROCEDURE — 83735 ASSAY OF MAGNESIUM: CPT | Performed by: STUDENT IN AN ORGANIZED HEALTH CARE EDUCATION/TRAINING PROGRAM

## 2023-11-01 PROCEDURE — 74018 RADEX ABDOMEN 1 VIEW: CPT

## 2023-11-01 PROCEDURE — 92526 ORAL FUNCTION THERAPY: CPT

## 2023-11-01 PROCEDURE — 82948 REAGENT STRIP/BLOOD GLUCOSE: CPT

## 2023-11-01 PROCEDURE — 82785 ASSAY OF IGE: CPT | Performed by: INTERNAL MEDICINE

## 2023-11-01 PROCEDURE — 71045 X-RAY EXAM CHEST 1 VIEW: CPT

## 2023-11-01 PROCEDURE — 82784 ASSAY IGA/IGD/IGG/IGM EACH: CPT | Performed by: INTERNAL MEDICINE

## 2023-11-01 PROCEDURE — 85025 COMPLETE CBC W/AUTO DIFF WBC: CPT | Performed by: NURSE PRACTITIONER

## 2023-11-01 PROCEDURE — 94660 CPAP INITIATION&MGMT: CPT

## 2023-11-01 PROCEDURE — 99232 SBSQ HOSP IP/OBS MODERATE 35: CPT | Performed by: INTERNAL MEDICINE

## 2023-11-01 PROCEDURE — 80053 COMPREHEN METABOLIC PANEL: CPT | Performed by: STUDENT IN AN ORGANIZED HEALTH CARE EDUCATION/TRAINING PROGRAM

## 2023-11-01 PROCEDURE — 63710000001 INSULIN LISPRO (HUMAN) PER 5 UNITS: Performed by: INTERNAL MEDICINE

## 2023-11-01 PROCEDURE — 25010000002 ENOXAPARIN PER 10 MG: Performed by: INTERNAL MEDICINE

## 2023-11-01 PROCEDURE — 85007 BL SMEAR W/DIFF WBC COUNT: CPT | Performed by: NURSE PRACTITIONER

## 2023-11-01 PROCEDURE — 25010000002 LORAZEPAM PER 2 MG: Performed by: INTERNAL MEDICINE

## 2023-11-01 PROCEDURE — 94799 UNLISTED PULMONARY SVC/PX: CPT

## 2023-11-01 RX ORDER — LANSOPRAZOLE 30 MG/1
30 TABLET, ORALLY DISINTEGRATING, DELAYED RELEASE ORAL 2 TIMES DAILY
Status: DISCONTINUED | OUTPATIENT
Start: 2023-11-01 | End: 2023-11-06 | Stop reason: HOSPADM

## 2023-11-01 RX ADMIN — ROSUVASTATIN 10 MG: 10 TABLET, FILM COATED ORAL at 21:23

## 2023-11-01 RX ADMIN — ACETAMINOPHEN 650 MG: 325 TABLET, FILM COATED ORAL at 06:19

## 2023-11-01 RX ADMIN — Medication 10 ML: at 09:44

## 2023-11-01 RX ADMIN — INSULIN LISPRO 4 UNITS: 100 INJECTION, SOLUTION INTRAVENOUS; SUBCUTANEOUS at 06:19

## 2023-11-01 RX ADMIN — METOCLOPRAMIDE 5 MG: 5 TABLET ORAL at 17:51

## 2023-11-01 RX ADMIN — Medication 10 ML: at 21:40

## 2023-11-01 RX ADMIN — LORAZEPAM 0.5 MG: 2 INJECTION INTRAMUSCULAR; INTRAVENOUS at 21:51

## 2023-11-01 RX ADMIN — LANSOPRAZOLE 30 MG: 30 TABLET, ORALLY DISINTEGRATING ORAL at 21:23

## 2023-11-01 RX ADMIN — INSULIN LISPRO 4 UNITS: 100 INJECTION, SOLUTION INTRAVENOUS; SUBCUTANEOUS at 17:50

## 2023-11-01 RX ADMIN — INSULIN LISPRO 8 UNITS: 100 INJECTION, SOLUTION INTRAVENOUS; SUBCUTANEOUS at 00:18

## 2023-11-01 RX ADMIN — METOCLOPRAMIDE 5 MG: 5 TABLET ORAL at 09:44

## 2023-11-01 RX ADMIN — SERTRALINE 150 MG: 100 TABLET, FILM COATED ORAL at 09:43

## 2023-11-01 RX ADMIN — SUCRALFATE 1 G: 1 TABLET ORAL at 12:38

## 2023-11-01 RX ADMIN — SENNOSIDES AND DOCUSATE SODIUM 2 TABLET: 8.6; 5 TABLET ORAL at 21:23

## 2023-11-01 RX ADMIN — PANTOPRAZOLE SODIUM 40 MG: 40 INJECTION, POWDER, LYOPHILIZED, FOR SOLUTION INTRAVENOUS at 09:43

## 2023-11-01 RX ADMIN — SUCRALFATE 1 G: 1 TABLET ORAL at 09:44

## 2023-11-01 RX ADMIN — CHLORTHALIDONE 25 MG: 25 TABLET ORAL at 09:43

## 2023-11-01 RX ADMIN — LOSARTAN POTASSIUM 25 MG: 25 TABLET, FILM COATED ORAL at 09:44

## 2023-11-01 RX ADMIN — ENOXAPARIN SODIUM 40 MG: 40 INJECTION, SOLUTION SUBCUTANEOUS at 15:56

## 2023-11-01 RX ADMIN — ACETAMINOPHEN 650 MG: 325 TABLET, FILM COATED ORAL at 21:23

## 2023-11-01 RX ADMIN — AMLODIPINE BESYLATE 10 MG: 5 TABLET ORAL at 09:44

## 2023-11-01 RX ADMIN — LEVOTHYROXINE SODIUM 100 MCG: 0.1 TABLET ORAL at 05:30

## 2023-11-01 RX ADMIN — LANSOPRAZOLE 30 MG: 30 TABLET, ORALLY DISINTEGRATING ORAL at 05:30

## 2023-11-01 RX ADMIN — SUCRALFATE 1 G: 1 TABLET ORAL at 17:51

## 2023-11-01 RX ADMIN — Medication 10 ML: at 21:39

## 2023-11-01 RX ADMIN — ROPINIROLE HYDROCHLORIDE 0.25 MG: 1 TABLET, FILM COATED ORAL at 21:23

## 2023-11-01 RX ADMIN — CYCLOBENZAPRINE 10 MG: 10 TABLET, FILM COATED ORAL at 21:23

## 2023-11-01 RX ADMIN — METOCLOPRAMIDE 5 MG: 5 TABLET ORAL at 02:16

## 2023-11-01 RX ADMIN — SUCRALFATE 1 G: 1 TABLET ORAL at 21:23

## 2023-11-01 NOTE — PROGRESS NOTES
: The patient feels better, denies any specific complaints    Vital Signs  Vitals:    11/01/23 1656   BP: 144/58   Pulse:    Resp: 22   Temp: 98 °F (36.7 °C)   SpO2:      I/O this shift:  In: -   Out: 700 [Urine:700]  I/O last 3 completed shifts:  In: 4254 [Other:2368; NG/GT:1886]  Out: 250 [Urine:250]      Physical Exam:    General: NG tube in place  HEENT:  Normocephalic, Atraumatic, EOMI, PERRLA, NO icterus,  Neck:  Supple, No JVD, No Carotid artery bruit, No lymphadenopathy  Chest: Clear to auscultation bilaterally,   Cardiovascular: Regular rate and rhythm. Positive S1 & S2, No rub, murmur or gallop.  Abdominal: Soft, nontender, no palpable organomegaly, no abdominal bruit, positive bowel sounds  Musculoskeletal: No joint tenderness or swelling, good range of motion, Adequate muscle strength on both sides, no cyanosis, clubbing or edema on lower extremities.  Neuro: Alert oriented x3, CN1 - 12 intact, No focal sensory or motor deficit.      Review of Systems:   CNS: Denied headaches, blurred vision, tingling or numbness in any part of body. No weakness or any impaired speech.  CVS: No chest pain or shortness of breath, No orthopnea or PND or exertional dyspnea,  Pulmonary: Denies shortness of breath, coughs, hematemesis, night sweats or weight loss.  GI: Denies diarrhea, nausea, vomiting. Denies weight loss, hematemesis, melena.  : Denies dysuria, frequency or hesitancy of urination  Vascular: Denies claudication, resting pain, tingling numbness or weakness in any part of the body.  Musculoskeletal: Denies Joint tenderness or pain, denies stiffness in joints, denies fever or weight loss, or skin rashes.    Current Labs  [unfilled]  Lab Results (last 24 hours)       Procedure Component Value Units Date/Time    Manual Differential [[id number]]  (Abnormal) Collected: 11/01/23 1425    Specimen: Blood Updated: 11/01/23 1645     Neutrophil % 5.0 %      Lymphocyte % 91.0 %      Monocyte % 1.0 %      Eosinophil %  3.0 %      Neutrophils Absolute 1.88 10*3/mm3      Lymphocytes Absolute 34.22 10*3/mm3      Monocytes Absolute 0.38 10*3/mm3      Eosinophils Absolute 1.13 10*3/mm3      Anisocytosis Slight/1+     Hypochromia Slight/1+     Ovalocytes Slight/1+     Polychromasia Slight/1+     Smudge Cells Slight/1+     Platelet Morphology Normal    Narrative:      Reviewed by Pathologist within the past 30 days on 10/11/23 .     CBC & Differential [[id number]]  (Abnormal) Collected: 11/01/23 1425    Specimen: Blood Updated: 11/01/23 1645    Narrative:      The following orders were created for panel order CBC & Differential.  Procedure                               Abnormality         Status                     ---------                               -----------         ------                     CBC Auto Differential[[id number]]        Abnormal            Final result               Scan Slide[[id number]]                                       Final result                 Please view results for these tests on the individual orders.    CBC Auto Differential [[id number]]  (Abnormal) Collected: 11/01/23 1425    Specimen: Blood Updated: 11/01/23 1645     WBC 37.60 10*3/mm3      RBC 3.33 10*6/mm3      Hemoglobin 8.8 g/dL      Hematocrit 29.1 %      MCV 87.3 fL      MCH 26.4 pg      MCHC 30.3 g/dL      RDW 15.6 %      RDW-SD 50.3 fl      MPV 9.0 fL      Platelets 483 10*3/mm3     Scan Slide [[id number]] Collected: 11/01/23 1425    Specimen: Blood Updated: 11/01/23 1645     Scan Slide --     Comment: See Manual Differential Results       Extra Tubes [[id number]] Collected: 11/01/23 1425    Specimen: Blood, Venous Line Updated: 11/01/23 1531    Narrative:      The following orders were created for panel order Extra Tubes.  Procedure                               Abnormality         Status                     ---------                               -----------         ------                     Gold Top - [facility][[id number]]                                    Final result                 Please view results for these tests on the individual orders.    Gold Top - [facility]ST [[id number]] Collected: 11/01/23 1425    Specimen: Blood Updated: 11/01/23 1531     Extra Tube Hold for add-ons.     Comment: Auto resulted.       AFB Culture - Wash, Bronchus [[id number]] Collected: 10/18/23 1440    Specimen: Wash from Bronchus Updated: 11/01/23 1515     AFB Culture No AFB isolated at 2 weeks     AFB Stain No acid fast bacilli seen on concentrated smear    POC Glucose Once [[id number]]  (Abnormal) Collected: 11/01/23 1152    Specimen: Blood Updated: 11/01/23 1154     Glucose 120 mg/dL      Comment: Serial Number: [id number]:  [id number]       Magnesium [[id number]]  (Abnormal) Collected: 11/01/23 0937    Specimen: Blood Updated: 11/01/23 1011     Magnesium 2.5 mg/dL     Comprehensive Metabolic Panel [[id number]]  (Abnormal) Collected: 11/01/23 0937    Specimen: Blood Updated: 11/01/23 1011     Glucose 98 mg/dL      BUN 65 mg/dL      Creatinine 0.99 mg/dL      Sodium 136 mmol/L      Potassium 4.4 mmol/L      Chloride 95 mmol/L      CO2 31.0 mmol/L      Calcium 9.1 mg/dL      Total Protein 6.0 g/dL      Albumin 3.2 g/dL      ALT (SGPT) 12 U/L      AST (SGOT) 18 U/L      Alkaline Phosphatase 134 U/L      Total Bilirubin 0.2 mg/dL      Globulin 2.8 gm/dL      A/G Ratio 1.1 g/dL      BUN/Creatinine Ratio 65.7     Anion Gap 10.0 mmol/L      eGFR 57.0 mL/min/1.73     Narrative:      GFR Normal >60  Chronic Kidney Disease <60  Kidney Failure <15    The GFR formula is only valid for adults with stable renal function between ages 18 and 70.    Immunoglobulins A/E/G/M Serum [[id number]] Collected: 11/01/23 0937    Specimen: Blood Updated: 11/01/23 0942    POC Glucose Once [[id number]]  (Abnormal) Collected: 11/01/23 0615    Specimen: Blood Updated: 11/01/23 0616     Glucose 164 mg/dL      Comment: Serial Number: [id number]:  [id number]       POC Glucose Once [[id number]]  (Abnormal) Collected:  10/31/23 2354    Specimen: Blood Updated: 10/31/23 2356     Glucose 208 mg/dL      Comment: Serial Number: [id number]:  [id number]       POC Glucose Once [[id number]]  (Abnormal) Collected: 10/31/23 1735    Specimen: Blood Updated: 10/31/23 1737     Glucose 155 mg/dL      Comment: Serial Number: [id number]:  [id number]             Current Radiology  Imaging Results (Last 24 Hours)       Procedure Component Value Units Date/Time    XR Chest 1 View [[id number]] Collected: 11/01/23 0708     Updated: 11/01/23 0712    Narrative:      XR CHEST 1 VW    Date of Exam: 11/1/2023 5:46 AM EDT    Indication: Hypoxia    Comparison: 10/31/2023     Findings:  Esophagogastric tube tip below the diaphragm. Heart size within normal limits for technique. Mild elevation of left mid abdomen change. Persistent left basilar airspace disease which may relate to atelectasis, unchanged. Right lung clear. No   pneumothorax. Suspect small left pleural effusion.        Impression:      Impression:  1. Esophagogastric tube tip below the diaphragm.  2. Persistent left basilar airspace with suspected small left pleural effusion.        Electronically Signed: Pavan Amezquita MD    11/1/2023 7:10 AM EDT    Workstation ID: [id number]          Current Meds    Current Facility-Administered Medications:     acetaminophen (TYLENOL) tablet 650 mg, 650 mg, Nasogastric, Q4H PRN, Vinicius Heredia, DO, 650 mg at 11/01/23 0619    amLODIPine (NORVASC) tablet 10 mg, 10 mg, Nasogastric, Q24H, Ra Myers MD, 10 mg at 11/01/23 0944    chlorthalidone (HYGROTON) tablet 25 mg, 25 mg, Nasogastric, Daily, Day, Anita G, APRN, 25 mg at 11/01/23 0943    cyclobenzaprine (FLEXERIL) tablet 10 mg, 10 mg, Nasogastric, TID PRN, Vinicius Heredia, DO, 10 mg at 10/28/23 1731    dextrose (D50W) (25 g/50 mL) IV injection 25 g, 25 g, Intravenous, Q15 Min PRN, Kalyan Mckeon MD, 25 g at 10/26/23 0614    dextrose (D50W) (25 g/50 mL) IV injection 50 mL, 50 mL,  Intravenous, Q1H PRN, Ra Myers MD, 50 mL at 10/14/23 1235    dextrose (GLUTOSE) oral gel 15 g, 15 g, Nasogastric, Q15 Min PRN, Vinicius Heredia DO    Enoxaparin Sodium (LOVENOX) syringe 40 mg, 40 mg, Subcutaneous, Daily, Foreign Bolton MD, 40 mg at 11/01/23 1556    Ferrous Sulfate 300 (60 Fe) MG/5ML solution 300 mg, 300 mg, Nasogastric, Once per day on Mon Wed Fri, Ra Myers MD, 300 mg at 10/30/23 1008    glucagon (GLUCAGEN) injection 1 mg, 1 mg, Intramuscular, Q15 Min PRN, Kalyan Mckeon MD    hydrALAZINE (APRESOLINE) injection 10 mg, 10 mg, Intravenous, Q6H PRN, Ramona Wilder, APRN, 10 mg at 10/29/23 0624    insulin lispro (HUMALOG/ADMELOG) injection 4-24 Units, 4-24 Units, Subcutaneous, Q6H, Vinicius Heredia DO, 4 Units at 11/01/23 0619    ipratropium-albuterol (DUO-NEB) nebulizer solution 3 mL, 3 mL, Nebulization, Q6H PRN, Day, Dawn G, APRN, 3 mL at 10/30/23 2330    lansoprazole (PREVACID SOLUTAB) disintegrating tablet Tablet Delayed Release Dispersible 30 mg, 30 mg, Nasogastric, BID, Fritz Archibald MD    levothyroxine (SYNTHROID, LEVOTHROID) tablet 100 mcg, 100 mcg, Nasogastric, Q AM, Day, Anita G, APRN, 100 mcg at 11/01/23 0530    lidocaine (XYLOCAINE) 5 % ointment, , , PRN, Vinicius Heredia DO, 1 application  at 10/18/23 1449    LORazepam (ATIVAN) injection 0.5 mg, 0.5 mg, Intravenous, Q6H PRN, Ra Myers MD    losartan (COZAAR) tablet 25 mg, 25 mg, Oral, Q24H, Ra Myers MD, 25 mg at 11/01/23 0944    metoclopramide (REGLAN) tablet 5 mg, 5 mg, Nasogastric, Q8H, Ra Myers MD, 5 mg at 11/01/23 0944    nitroglycerin (NITROSTAT) SL tablet 0.4 mg, 0.4 mg, Sublingual, Q5 Min PRN, Kalyan Mckeon MD    ondansetron (ZOFRAN) injection 4 mg, 4 mg, Intravenous, Q6H PRN, Kalyan Mckeon MD, 4 mg at 10/31/23 0746    oxyCODONE (ROXICODONE) immediate release tablet 5 mg, 5 mg, Nasogastric, Q6H PRN, Vinicius Heredia DO, 5 mg at 10/30/23 0957     polyethylene glycol (MIRALAX) packet 17 g, 17 g, Oral, Daily PRN, Foreign Bolton MD, 17 g at 10/30/23 2313    rOPINIRole (REQUIP) tablet 0.25 mg, 0.25 mg, Nasogastric, Nightly, Foreign Bolton MD, 0.25 mg at 10/31/23 2053    rosuvastatin (CRESTOR) tablet 10 mg, 10 mg, Nasogastric, Nightly, Day, Anita G, APRN, 10 mg at 10/31/23 2053    sennosides-docusate (PERICOLACE) 8.6-50 MG per tablet 2 tablet, 2 tablet, Nasogastric, BID, Foreign Bolton MD, 2 tablet at 10/31/23 1000    sertraline (ZOLOFT) tablet 150 mg, 150 mg, Nasogastric, Daily, Foreign Bolton MD, 150 mg at 11/01/23 0943    [COMPLETED] Insert Peripheral IV, , , Once **AND** sodium chloride 0.9 % flush 10 mL, 10 mL, Intravenous, PRN, Sheree Field PA-C    sodium chloride 0.9 % flush 10 mL, 10 mL, Intravenous, Q12H, Kalyan Mckeon MD, 10 mL at 11/01/23 0944    sodium chloride 0.9 % flush 10 mL, 10 mL, Intravenous, PRN, Kalyan Mckeon MD    sodium chloride 0.9 % flush 10 mL, 10 mL, Intravenous, Q12H, Day, Anita G, APRN, 10 mL at 11/01/23 0944    sodium chloride 0.9 % flush 10 mL, 10 mL, Intravenous, PRN, Day, Anita G, APRN    sodium chloride 0.9 % infusion 40 mL, 40 mL, Intravenous, PRN, Kalyan Mckeon MD    sodium chloride 0.9 % infusion 40 mL, 40 mL, Intravenous, PRN, Day, Anita G, APRN    sucralfate (CARAFATE) tablet 1 g, 1 g, Nasogastric, 4x Daily AC & at Bedtime, Ra Myers MD, 1 g at 11/01/23 1238      Assessment and plan;          Acute hypoxemic respiratory failure    Mixed anxiety and depressive disorder    Primary osteoarthritis involving multiple joints    Hiatal hernia with gastroesophageal reflux    Mixed hyperlipidemia    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    Acquired hypothyroidism    Type 2 diabetes mellitus with stage 3b chronic kidney disease, without long-term current use of insulin    Insomnia    Overactive bladder    Vitamin B 12 deficiency    Vitamin D deficiency    LIBBY (obstructive sleep apnea)     COPD (chronic obstructive pulmonary disease)    CKD (chronic kidney disease) stage 3, GFR 30-59 ml/min    History of breast cancer    Personal history of CLL (chronic lymphocytic leukemia)    History of cigarette smoking    Fall at home    Rhinovirus infection    Acute respiratory failure    Hyperkalemia    Respiratory failure  ACUTE KIDNEY INJURY RESOLVING   UTI/PNA/ SEPSIS , CONTINUE ANTIBIOTICS   WILL MONITOR ELECTROLYTES , ACID BASE AND RENAL FUNCTION      Unique Luna MD FACP, FASN.  11/01/23  17:33 EDT

## 2023-11-01 NOTE — CONSULTS
Palliative Care Social Work Progress Note    Code Status:do not resuscitate    Goals of Care: Limited Additonal Intervention     Narrative: Palliative care  met with patient and daughter Harmony at bedside to discuss goals of care. Daughter affirmed patient is a DNR/DNI. Patient does not have a designated POA. Patients  is reported to have some confusion so children are primary decision makers. Family is planning to have a discussion soon to discuss next steps. Hospice information provided to daughter in the event they become interested. Emotional support provided.     Plan: Palliative care team following          Tata Vega

## 2023-11-01 NOTE — PLAN OF CARE
Goal Outcome Evaluation:              Outcome Evaluation: Patient restarted back on her tube feeds this afternoon with 180 residual returned. Patient remains on 8 liters HF oxgen. Patient got up with 2 assist to the chair several times and has become much weaker than day before. Patient declined speech therapy today to evaluate swallow. Family remained at bedside. No new complaints.

## 2023-11-01 NOTE — PROGRESS NOTES
Infectious Diseases Progress Note      LOS: 22 days   Patient Care Team:  Asia Queen APRN as PCP - General (Nurse Practitioner)  Asia Queen APRN as Nurse Practitioner (Nurse Practitioner)  Ling Bolden MD as Consulting Physician (Hematology and Oncology)    Chief Complaint: Intubated on the ventilator    Subjective     The patient had no high-grade fever during the last 24 hours.  Patient is currently on 8 L cannula via nasal cannula and is alert and awake.  The patient remained hemodynamically stable    Review of Systems:   Review of Systems   Unable to perform ROS: Acuity of condition   Constitutional:  Positive for fatigue.   Respiratory:  Positive for cough and shortness of breath.         Objective     Vital Signs  Temp:  [97.2 °F (36.2 °C)-98 °F (36.7 °C)] 98 °F (36.7 °C)  Heart Rate:  [71-74] 71  Resp:  [17-22] 22  BP: (114-144)/(44-58) 144/58    Physical Exam:  Physical Exam  Vitals and nursing note reviewed.   Constitutional:       Appearance: She is ill-appearing.   HENT:      Head: Normocephalic and atraumatic.   Eyes:      Conjunctiva/sclera: Conjunctivae normal.      Pupils: Pupils are equal, round, and reactive to light.   Cardiovascular:      Rate and Rhythm: Normal rate and regular rhythm.   Pulmonary:      Effort: Respiratory distress present.      Breath sounds: Rales present.   Abdominal:      General: Abdomen is flat. Bowel sounds are normal.      Palpations: Abdomen is soft.      Comments: NG tube   Genitourinary:     Comments: External catheter  Musculoskeletal:      Cervical back: Neck supple.      Right lower leg: No edema.      Left lower leg: No edema.   Neurological:      Mental Status: She is alert.      Comments: Alert and awake          Results Review:    I have reviewed all clinical data, test, lab, and imaging results.     Radiology  XR Chest 1 View    Result Date: 11/1/2023  XR CHEST 1 VW Date of Exam: 11/1/2023 5:46 AM EDT Indication: Hypoxia Comparison:  10/31/2023 Findings: Esophagogastric tube tip below the diaphragm. Heart size within normal limits for technique. Mild elevation of left mid abdomen change. Persistent left basilar airspace disease which may relate to atelectasis, unchanged. Right lung clear. No pneumothorax. Suspect small left pleural effusion.     Impression: 1. Esophagogastric tube tip below the diaphragm. 2. Persistent left basilar airspace with suspected small left pleural effusion. Electronically Signed: Pavan Amezquita MD  11/1/2023 7:10 AM EDT  Workstation ID: [id number]     Cardiology    Laboratory    Results from last 7 days   Lab Units 11/01/23  1425 10/31/23  0844 10/29/23  2331 10/29/23  0800 10/29/23  0324 10/28/23  1344 10/27/23  0428   WBC 10*3/mm3 37.60* 39.00* 59.40* 65.20* 60.50* 26.10* 19.40*   HEMOGLOBIN g/dL 8.8* 8.2* 9.1* 9.2* 8.7* 9.4* 7.6*   HEMATOCRIT % 29.1* 27.1* 29.4* 30.8* 29.3* 29.9* 24.9*   PLATELETS 10*3/mm3 483* 525* 546* 568* 509* 316 250     Results from last 7 days   Lab Units 11/01/23  0937 10/31/23  0844 10/30/23  1212 10/29/23  0121 10/28/23  0625 10/27/23  0428 10/26/23  0310   SODIUM mmol/L 136 144 148* 142 142 139 141   POTASSIUM mmol/L 4.4 3.9 4.0 4.1 4.5 3.9 3.6   CHLORIDE mmol/L 95* 99 103 102 108* 103 103   CO2 mmol/L 31.0* 34.0* 28.0 26.0 23.0 26.0 26.0   BUN mg/dL 65* 63* 69* 73* 75* 101* 103*   CREATININE mg/dL 0.99 1.06* 1.19* 1.15* 0.99 1.27* 1.38*   GLUCOSE mg/dL 98 162* 154* 176* 135* 174* 84   ALBUMIN g/dL 3.2* 3.3* 3.5 3.2* 3.0* 2.6* 2.8*   BILIRUBIN mg/dL 0.2 0.2 0.2 0.2 <0.2 <0.2 0.2   ALK PHOS U/L 134* 157* 148* 137* 123* 109 118*   AST (SGOT) U/L 18 20 20 22 18 16 22   ALT (SGPT) U/L 12 11 16 14 16 15 22   CALCIUM mg/dL 9.1 9.3 9.6 9.3 9.0 8.5* 9.0                 Microbiology   Microbiology Results (last 10 days)       Procedure Component Value - Date/Time    Respiratory Panel PCR w/COVID-19(SARS-CoV-2) LESLEE/VIVIANE/PAVAN/PAD/COR/MAD/JUAN DAVID In-House, NP Swab in UTM/VTM, 3-4 HR TAT - Swab, Nasopharynx  [[id number]]  (Normal) Collected: 10/29/23 1113    Lab Status: Final result Specimen: Swab from Nasopharynx Updated: 10/29/23 1218     ADENOVIRUS, PCR Not Detected     Coronavirus 229E Not Detected     Coronavirus HKU1 Not Detected     Coronavirus NL63 Not Detected     Coronavirus OC43 Not Detected     COVID19 Not Detected     Human Metapneumovirus Not Detected     Human Rhinovirus/Enterovirus Not Detected     Influenza A PCR Not Detected     Influenza B PCR Not Detected     Parainfluenza Virus 1 Not Detected     Parainfluenza Virus 2 Not Detected     Parainfluenza Virus 3 Not Detected     Parainfluenza Virus 4 Not Detected     RSV, PCR Not Detected     Bordetella pertussis pcr Not Detected     Bordetella parapertussis PCR Not Detected     Chlamydophila pneumoniae PCR Not Detected     Mycoplasma pneumo by PCR Not Detected    Narrative:      In the setting of a positive respiratory panel with a viral infection PLUS a negative procalcitonin without other underlying concern for bacterial infection, consider observing off antibiotics or discontinuation of antibiotics and continue supportive care. If the respiratory panel is positive for atypical bacterial infection (Bordetella pertussis, Chlamydophila pneumoniae, or Mycoplasma pneumoniae), consider antibiotic de-escalation to target atypical bacterial infection.    Blood Culture - Blood, Hand, Left [[id number]]  (Normal) Collected: 10/23/23 1612    Lab Status: Final result Specimen: Blood from Hand, Left Updated: 10/28/23 1631     Blood Culture No growth at 5 days    Blood Culture - Blood, Arm, Left [[id number]]  (Normal) Collected: 10/23/23 1611    Lab Status: Final result Specimen: Blood from Arm, Left Updated: 10/28/23 1631     Blood Culture No growth at 5 days    Respiratory Culture - Aspirate, ET Suction [[id number]]  (Abnormal)  (Susceptibility) Collected: 10/23/23 1356    Lab Status: Final result Specimen: Aspirate from ET Suction Updated: 10/25/23 1448      Respiratory Culture Heavy growth (4+) Klebsiella aerogenes      No Normal Respiratory Leanna     Gram Stain Less than 10 Epithelial cells per low power field      Moderate (3+) WBCs seen      Many (4+) Gram negative bacilli      Occasional Gram positive cocci in clusters      Rare (1+) Yeast    Susceptibility        Klebsiella aerogenes      JUAN      Cefepime Susceptible      Ceftazidime Resistant      Ceftriaxone Resistant      Gentamicin Susceptible      Levofloxacin Susceptible      Piperacillin + Tazobactam Resistant      Tetracycline Susceptible      Trimethoprim + Sulfamethoxazole Susceptible                       Susceptibility Comments       Klebsiella aerogenes    Cefazolin sensitivity will not be reported for Enterobacteriaceae in non-urine isolates. If cefazolin is preferred, please call the microbiology lab to request an E-test.  With the exception of urinary-sourced infections, aminoglycosides should not be used as monotherapy.                       Medication Review:       Schedule Meds  amLODIPine, 10 mg, Nasogastric, Q24H  chlorthalidone, 25 mg, Nasogastric, Daily  enoxaparin, 40 mg, Subcutaneous, Daily  Ferrous Sulfate, 300 mg, Nasogastric, Once per day on Mon Wed Fri  insulin lispro, 4-24 Units, Subcutaneous, Q6H  lansoprazole, 30 mg, Nasogastric, BID  levothyroxine, 100 mcg, Nasogastric, Q AM  losartan, 25 mg, Oral, Q24H  metoclopramide, 5 mg, Nasogastric, Q8H  rOPINIRole, 0.25 mg, Nasogastric, Nightly  rosuvastatin, 10 mg, Nasogastric, Nightly  senna-docusate sodium, 2 tablet, Nasogastric, BID  sertraline, 150 mg, Nasogastric, Daily  sodium chloride, 10 mL, Intravenous, Q12H  sodium chloride, 10 mL, Intravenous, Q12H  sucralfate, 1 g, Nasogastric, 4x Daily AC & at Bedtime        Infusion Meds         PRN Meds    acetaminophen    cyclobenzaprine    dextrose    dextrose    dextrose    glucagon (human recombinant)    hydrALAZINE    ipratropium-albuterol    lidocaine    LORazepam    nitroglycerin     ondansetron    oxyCODONE    polyethylene glycol    [COMPLETED] Insert Peripheral IV **AND** sodium chloride    sodium chloride    sodium chloride    sodium chloride    sodium chloride        Assessment & Plan       Antimicrobial Therapy   1.  IV meropenem       2.        3.        4.        5.            Assessment     Acute respiratory failure.  Most likely secondary to hospital-acquired pneumonia.  CT scan of the chest on October 18 showed bilateral lower lobe infiltrates.  There was no additional findings consistent with PCP pneumonia  Patient is s/p bronchoscopy and BAL cultures are negative  MRSA screen was negative  -Patient is now extubated and on 10 L of oxygen by nasal cannula     Severe lymphocytosis.  Most likely secondary to underlying CLL.  Patient was not on treatment prior to admission.  Improving     History of breast cancer s/p mastectomies in the past     UTI with Klebsiella pneumoniae and E. coli.  Organisms were relatively susceptible     Diabetes mellitus type 2     Rhinovirus infection.  Nasal swab was positive for rhinovirus PCR    Mild septic shock-currently off vasopressors    Possible ventilator associated pneumonia.  Tracheal aspirate culture from October 23, 2023 grew Klebsiella aerogenes.  Based on the susceptibility I am suspecting AmpC  pathogen     Plan  Patient received 10 days of IV Merrem which ended on 10/31/2023    Monitor off antimicrobial therapy  Continue supportive care  Case discussed with patient's family at bedside  Family speaking with palliative care  Not much more to add from infectious disease standpoint-we will sign off at this time-please call with any questions.      Kami Dias, APRN  11/01/23  17:12 EDT    Note is dictated utilizing voice recognition software/Dragon

## 2023-11-01 NOTE — PROGRESS NOTES
Nutrition Services  Patient Name: Diane Guan  YOB: 1940  MRN: [id number]  Admission date: 10/10/2023    PPE Documentation        PPE Worn By Provider Did not enter room for this encounter   PPE Worn By Patient  N/A     PROGRESS NOTE      Encounter Information: Progress note to check on TF.  NG remains in place for feeding. ST followed up with pt today and still recommends NPO with alternative route for nutrition. Per update from RN, TF was on hold for a time due to pt C/O stomach discomfort, but this has since resolved, and TF is infusing again. Nutren 1.5 currently infusing at 55mL/hour, per documentation.        PO Diet: NPO Diet NPO Type: Strict NPO   PO Supplements: None ordered    PO Intake:  NPO       Current nutrition support: Nutren 1.5 at 55 mL/hour + 85 mL/hour water flush    Nutrition support review: Infusing as above, per documentation        Labs (reviewed below): Sodium is now WNL, has corrected with increased water flush.   Will reduce flush today to avoid over-correction.  Cr is now WNL       GI Function:  Last documented BM 11/1 (today)  Residuals WNL       Nutrition Intervention Updates: Reduce water flush to 45mL/hour.       Results from last 7 days   Lab Units 11/01/23  0937 10/31/23  0844 10/30/23  1212   SODIUM mmol/L 136 144 148*   POTASSIUM mmol/L 4.4 3.9 4.0   CHLORIDE mmol/L 95* 99 103   CO2 mmol/L 31.0* 34.0* 28.0   BUN mg/dL 65* 63* 69*   CREATININE mg/dL 0.99 1.06* 1.19*   CALCIUM mg/dL 9.1 9.3 9.6   BILIRUBIN mg/dL 0.2 0.2 0.2   ALK PHOS U/L 134* 157* 148*   ALT (SGPT) U/L 12 11 16   AST (SGOT) U/L 18 20 20   GLUCOSE mg/dL 98 162* 154*     Results from last 7 days   Lab Units 11/01/23  0937 10/31/23  0844 10/30/23  1212 10/28/23  0625 10/27/23  0428   MAGNESIUM mg/dL 2.5* 2.6* 2.5*   < > 2.5*   HEMOGLOBIN g/dL  --  8.2*  --    < > 7.6*   HEMATOCRIT %  --  27.1*  --    < > 24.9*   TRIGLYCERIDES mg/dL  --   --   --   --  407*    < > = values in this interval not  displayed.     COVID19   Date Value Ref Range Status   10/29/2023 Not Detected Not Detected - Ref. Range Final     Lab Results   Component Value Date    HGBA1C 6.50 (H) 10/13/2023     RD to follow up per protocol.    Electronically signed by:  Kita Chino RD  11/01/23 16:39 EDT

## 2023-11-01 NOTE — PLAN OF CARE
Goal Outcome Evaluation:  Plan of Care Reviewed With: patient, daughter        Progress: no change  Outcome Evaluation: Patient had 2 large/mod black, tarry stools overnight. Possible swallow study today, continue TF for nutrition and meds. On 8L NC overnight.

## 2023-11-01 NOTE — PROGRESS NOTES
Hematology/Oncology Inpatient Progress Note    PATIENT NAME: Diane Guan  : 1940  MRN: [id number]    CHIEF COMPLAINT: Falls, respiratory failure, leukocytosis with lymphocytosis    HISTORY OF PRESENT ILLNESS:      Diane Guan is a 83 y.o. female who presented to [facility] on 10/10/2023 with complaints of      MsZachery Guan was admitted to the hospital on 10/10/2023. She had fallen, sustaining some trauma to the chest. She complained of weakness of the right lower extremity. In addition she had a respiratory panel positive for rhinovirus. She had been treated in the past for early stage breast cancer and had remained free of evidence of recurrent disease. In addition she was known for chronic lymphocytic leukemia and had maintained significant lymphocytosis for some time but had never needed treatment. At the time of the admission she had maintained a similar white cell count. Today for the first time she had a very significant increase in the total white cell count and platelets. In addition she had significant thrombocytosis.   In spite of all efforts she had very slow improvement. She had continued to have difficulties with weakness and anorexia. She developed pneumonia and had to be intubated; she was finally extubated on 10/27.  On 10/23/2023 she was found to have positive blood cultures for E coli and Klebsiella. At the time of this consult she was still on antibiotics. She also had had evidence of gastrointestinal bleeding; she was diagnosed with iron deficiency and was started on iron but iron studies were not available.      He/She  has a past medical history of Acute bronchitis due to human metapneumovirus (2020), Anxiety disorder, Arthritis, Breast cancer (2019), Chronic lymphocytic leukemia (CLL), B-cell (2019), Depression, Disease of thyroid gland, Diverticulosis of colon (), Dysphagia (2020), Dyspnea on exertion, Fall at home (10/11/2023),  Hemorrhoid, Hiatal hernia (12/2020), Hiatal hernia with GERD, History of breast cancer (10/11/2023), History of cigarette smoking (10/11/2023), History of diabetes mellitus, Hyperlipidemia, Hypertension, Mycobacterium avium complex (02/2019), OAB (overactive bladder), Personal history of CLL (chronic lymphocytic leukemia) (10/11/2023), Skin cancer, and Sleep apnea.     PCP: Asia Queen APRN    Subjective     Patient seen with family.    ROS:  Review of Systems   Constitutional:  Positive for activity change, appetite change and fatigue. Negative for chills and fever.   HENT:  Negative for congestion, drooling, ear discharge, rhinorrhea, sinus pressure and tinnitus.    Eyes:  Negative for photophobia, pain and discharge.   Respiratory:  Negative for apnea, choking and stridor.    Cardiovascular:  Negative for palpitations.   Gastrointestinal:  Negative for abdominal distention, abdominal pain and anal bleeding.   Endocrine: Negative for polydipsia and polyphagia.   Genitourinary:  Negative for decreased urine volume, flank pain and genital sores.   Musculoskeletal:  Negative for gait problem, neck pain and neck stiffness.   Skin:  Negative for color change, rash and wound.   Neurological:  Positive for weakness. Negative for tremors, seizures, syncope, facial asymmetry and speech difficulty.   Hematological:  Negative for adenopathy.   Psychiatric/Behavioral:  Negative for agitation, confusion, hallucinations and self-injury. The patient is not hyperactive.         MEDICATIONS:    Scheduled Meds:  amLODIPine, 10 mg, Nasogastric, Q24H  chlorthalidone, 25 mg, Nasogastric, Daily  enoxaparin, 40 mg, Subcutaneous, Daily  Ferrous Sulfate, 300 mg, Nasogastric, Once per day on Mon Wed Fri  insulin lispro, 4-24 Units, Subcutaneous, Q6H  lansoprazole, 30 mg, Nasogastric, Q AM  levothyroxine, 100 mcg, Nasogastric, Q AM  losartan, 25 mg, Oral, Q24H  metoclopramide, 5 mg, Nasogastric, Q8H  pantoprazole, 40 mg, Intravenous,  "Q12H  rOPINIRole, 0.25 mg, Nasogastric, Nightly  rosuvastatin, 10 mg, Nasogastric, Nightly  senna-docusate sodium, 2 tablet, Nasogastric, BID  sertraline, 150 mg, Nasogastric, Daily  sodium chloride, 10 mL, Intravenous, Q12H  sodium chloride, 10 mL, Intravenous, Q12H  sucralfate, 1 g, Nasogastric, 4x Daily AC & at Bedtime       Continuous Infusions:      PRN Meds:    acetaminophen    cyclobenzaprine    dextrose    dextrose    dextrose    glucagon (human recombinant)    hydrALAZINE    ipratropium-albuterol    lidocaine    LORazepam    nitroglycerin    ondansetron    oxyCODONE    polyethylene glycol    [COMPLETED] Insert Peripheral IV **AND** sodium chloride    sodium chloride    sodium chloride    sodium chloride    sodium chloride     ALLERGIES:  No Known Allergies    Objective    VITALS:   /50 (BP Location: Left arm, Patient Position: Lying)   Pulse 71   Temp 97.2 °F (36.2 °C) (Oral)   Resp 17   Ht 160 cm (63\")   Wt 78.4 kg (172 lb 13.5 oz)   SpO2 90%   BMI 30.62 kg/m²     PHYSICAL EXAM: (performed by MD)  Physical Exam  Constitutional:       General: She is in acute distress.      Appearance: She is ill-appearing.   HENT:      Head:      Comments: Ng tube in palce     Mouth/Throat:      Mouth: Mucous membranes are moist.      Pharynx: Oropharynx is clear.   Eyes:      Extraocular Movements: Extraocular movements intact.      Pupils: Pupils are equal, round, and reactive to light.   Pulmonary:      Effort: Pulmonary effort is normal.   Neurological:      General: No focal deficit present.      Mental Status: Mental status is at baseline.     I have reexamined the patient and the results are consistent with the previously documented exam. Ling Bolden MD       RECENT LABS:  Lab Results (last 24 hours)       Procedure Component Value Units Date/Time    POC Glucose Once [[id number]]  (Abnormal) Collected: 11/01/23 0615    Specimen: Blood Updated: 11/01/23 0616     Glucose 164 mg/dL      Comment: " Serial Number: [id number]:  [id number]       POC Glucose Once [[id number]]  (Abnormal) Collected: 10/31/23 2354    Specimen: Blood Updated: 10/31/23 2356     Glucose 208 mg/dL      Comment: Serial Number: [id number]:  [id number]       POC Glucose Once [[id number]]  (Abnormal) Collected: 10/31/23 1735    Specimen: Blood Updated: 10/31/23 1737     Glucose 155 mg/dL      Comment: Serial Number: [id number]:  [id number]       POC Glucose Once [[id number]]  (Abnormal) Collected: 10/31/23 1147    Specimen: Blood Updated: 10/31/23 1148     Glucose 180 mg/dL      Comment: Serial Number: [id number]:  [id number]       CBC & Differential [[id number]]  (Abnormal) Collected: 10/31/23 0844    Specimen: Blood Updated: 10/31/23 0951    Narrative:      The following orders were created for panel order CBC & Differential.  Procedure                               Abnormality         Status                     ---------                               -----------         ------                     CBC Auto Differential[[id number]]        Abnormal            Final result               Scan Slide[[id number]]                                       Final result                 Please view results for these tests on the individual orders.    CBC Auto Differential [[id number]]  (Abnormal) Collected: 10/31/23 0844    Specimen: Blood Updated: 10/31/23 0951     WBC 39.00 10*3/mm3      RBC 3.06 10*6/mm3      Hemoglobin 8.2 g/dL      Hematocrit 27.1 %      MCV 88.5 fL      MCH 26.7 pg      MCHC 30.2 g/dL      RDW 15.5 %      RDW-SD 47.3 fl      MPV 8.9 fL      Platelets 525 10*3/mm3     Narrative:      The previously reported component NRBC is no longer being reported. Previous result was 0.1 /100 WBC (Reference Range: 0.0-0.2 /100 WBC) on 10/31/2023 at 0905 EDT.    Scan Slide [[id number]] Collected: 10/31/23 0844    Specimen: Blood Updated: 10/31/23 0951     Scan Slide --     Comment: See Manual Differential Results       Manual  Differential [[id number]]  (Abnormal) Collected: 10/31/23 0844    Specimen: Blood Updated: 10/31/23 0951     Neutrophil % 32.0 %      Lymphocyte % 64.0 %      Monocyte % 1.0 %      Eosinophil % 2.0 %      Bands %  1.0 %      Neutrophils Absolute 12.87 10*3/mm3      Lymphocytes Absolute 24.96 10*3/mm3      Monocytes Absolute 0.39 10*3/mm3      Eosinophils Absolute 0.78 10*3/mm3      Anisocytosis Slight/1+     Hypochromia Slight/1+     Smudge Cells Slight/1+     Large Platelets Slight/1+    Narrative:      Reviewed by Pathologist within the past 30 days on [id number] .      Magnesium [[id number]]  (Abnormal) Collected: 10/31/23 0844    Specimen: Blood Updated: 10/31/23 0912     Magnesium 2.6 mg/dL     Comprehensive Metabolic Panel [[id number]]  (Abnormal) Collected: 10/31/23 0844    Specimen: Blood Updated: 10/31/23 0912     Glucose 162 mg/dL      BUN 63 mg/dL      Creatinine 1.06 mg/dL      Sodium 144 mmol/L      Potassium 3.9 mmol/L      Chloride 99 mmol/L      CO2 34.0 mmol/L      Calcium 9.3 mg/dL      Total Protein 6.1 g/dL      Albumin 3.3 g/dL      ALT (SGPT) 11 U/L      AST (SGOT) 20 U/L      Alkaline Phosphatase 157 U/L      Total Bilirubin 0.2 mg/dL      Globulin 2.8 gm/dL      A/G Ratio 1.2 g/dL      BUN/Creatinine Ratio 59.4     Anion Gap 11.0 mmol/L      eGFR 52.6 mL/min/1.73     Narrative:      GFR Normal >60  Chronic Kidney Disease <60  Kidney Failure <15    The GFR formula is only valid for adults with stable renal function between ages 18 and 70.            PENDING RESULTS:     IMAGING REVIEWED:  XR Chest 1 View    Result Date: 11/1/2023  Impression: 1. Esophagogastric tube tip below the diaphragm. 2. Persistent left basilar airspace with suspected small left pleural effusion. Electronically Signed: Pavan Amezquita MD  11/1/2023 7:10 AM EDT  Workstation ID: [id number]    XR Chest 1 View    Result Date: 10/31/2023  Impression: Resolution of right basilar atelectasis. Continued partial atelectasis of the left lower  lobe. Electronically Signed: Rosa Melendez MD  10/31/2023 7:15 AM EDT  Workstation ID: [id number]    XR Abdomen KUB    Result Date: 10/30/2023  Impression: Gastric suction tube terminates in the stomach. Electronically Signed: Miguel Trent MD  10/30/2023 11:14 PM EDT  Workstation ID: [id number]     Assessment & Plan   ASSESSMENT:  Chronic lymphocytic leukemia: First diagnosed at the end of 2022 and she has not needed treatment since the diagnosis.  Her condition is complicated at this time but the severity of her present illness.  The sudden marked increase in the white cell count is most likely not representative of the malignant disease but rather connected to the present illness. 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   Normocytic anemia: Has experienced coffee-ground emesis and gastroenterology is following but patient is a poor candidate for sedation/EGD.  They plan for EGD only for life-threatening GI bleed at this time.Haptoglobin was elevated indicating low likelihood of hemolytic anemia.  Continue to monitor her hemoglobin so far remained stable.  On PPI/Carafate  Acute respiratory failure with hypoxia and hypercapnia/ARDS hospital-acquired pneumonia fluid overload and pulmonary edema.  Septic shock due to UTI/E. coli and Klebsiella bacteremia ID is following  Coffee-ground emesis: Not a candidate for EGD on twice daily PPI, Carafate tube feeds per primary  History of stage II right breast cancer status post right mastectomy.  ER positive, VT positive, HER2/juan luis negative.  She remains on Arimidex  Nutrition via tube feeds    PLAN:  Monitor CBC and transfuse for hemoglobin less than 7.0 g/dL  Continue IV antibiotics and supportive care by ID  Serum IgG is pending  Leukocytosis is beginning to decrease  Antibiotics per infectious disease.  IV Merrem completed 10/31/2023             Electronically signed by Ling Bolden MD, 11/02/23, 8:41 AM EDT.

## 2023-11-01 NOTE — PROGRESS NOTES
[facility] Medicine Services   Daily Progress Note    Patient Name: Diane Guan  : 1940  MRN: [id number]  Primary Care Physician:  Asia Queen, SIOMARA  Date of admission: 10/10/2023    Subjective      Chief Complaint: Patient came in after a fall with right rib and right-sided back pain.     History of Present Illness: Diane Guan is a 83 y.o. female with past medical history of DM 2, hypertension and breast cancer who presented to [facility] on 10/10/2023 complaining of fall in the bathroom today with hitting her right side of the rib cage and right back with pain.  She says that her right leg has a little problem giving away at times and gave her while she was in the restroom today and she fell hitting her right side of the chest wall and right back on the commode.  She had pretty significant pain and that is what brought her here.  She says that she had some cough going on for about 3 weeks and was given a Z-Erick with which her cough is getting better.  She denies any fever chills shortness of breath nausea vomiting diarrhea constipation abdominal pain hematemesis hematochezia melena hemoptysis dysuria or hematuria.  Patient received a dose of Dilaudid in the ER and then her oxygen saturation dropped and she had to be started on 2 L of oxygen by nasal cannula.  I believe the Dilaudid caused some respiratory depression on top of her difficulty to take a deep breath anyways because of the pain secondary to fall has caused this acute respiratory failure.     10/29/2023 patient seen and examined in bed no acute distress, family at bedside, blood pressure 180/72.  Dyspnea on 15 L.  We were able to decrease oxygen to 8 L saturation remained 94%.  Discussed with RN.  Tolerating antibiotics.  Afebrile,  10/30/2023 patient seen and examined in bed  patient with significant dyspnea on 10 L, dysphagia, now with NG tube.  Discussed with RN.  Palliative care consulted.  Patient now  DNR.  10/31/23 patient seen and examined in bed acute distress, having significant dyspnea on 15 L of oxygen, vital signs stable, family at bedside, discussed with RN.  Creatinine 1.06  11/1/23 patient seen and examined in bed no acute distress family at bedside, dyspnea on 8 L.  Vital signs stable, patient complains of abdominal pain. now off tube feeding.    Review of Systems   Constitutional: Negative for chills, fever and night sweats.   HENT:  Positive for congestion. Negative for hoarse voice and sore throat.    Eyes:  Negative for blurred vision and double vision.   Cardiovascular:  Positive for chest pain. Negative for dyspnea on exertion, leg swelling, near-syncope, orthopnea, palpitations and syncope.   Respiratory:  Positive for cough. Negative for hemoptysis, shortness of breath, sputum production and wheezing.    Endocrine: Negative for cold intolerance and heat intolerance.   Skin:  Negative for itching and rash.   Musculoskeletal:  Positive for back pain and myalgias. Negative for neck pain and stiffness.        Patient had a fall today and last fall was about a year ago.   Gastrointestinal:  Negative for abdominal pain, constipation, diarrhea, hematemesis, hematochezia, melena, nausea and vomiting.   Genitourinary:  Negative for dysuria, frequency, hematuria and urgency.   Neurological:  Negative for excessive daytime sleepiness, dizziness, focal weakness, headaches, light-headedness, loss of balance and weakness.   Psychiatric/Behavioral:  Negative for altered mental status, depression and hallucinations.       Objective      Vitals:   Temp:  [97.2 °F (36.2 °C)-98 °F (36.7 °C)] 97.5 °F (36.4 °C)  Heart Rate:  [71-74] 71  Resp:  [17-22] 18  BP: (114-132)/(44-88) 130/47  Flow (L/min):  [8-15] 8    Physical Exam     Constitutional:       General: She is awake. She is not in acute distress.     Appearance: Normal appearance.   HENT:      Mouth/Throat:      Mouth: Mucous membranes are moist.       Pharynx: Oropharynx is clear.   Eyes:      General: No scleral icterus.     Extraocular Movements: Extraocular movements intact.      Conjunctiva/sclera: Conjunctivae normal.   Cardiovascular:      Rate and Rhythm: Normal rate.      Heart sounds: No murmur heard.     No gallop.   Pulmonary:      Breath sounds: Rales (minimal bibasilar) present. No wheezing.   Abdominal:      General: Bowel sounds are normal.      Palpations: Abdomen is soft.      Tenderness: There is no abdominal tenderness.   Musculoskeletal:         General: No tenderness.      Right lower leg: No edema.      Left lower leg: No edema.   Neurological:      Mental Status: She is alert.      Comments: Alert, Awake, oriented to place and person. Generalized weakness   Psychiatric:         Behavior: Behavior is cooperative.          Result Review    Result Review:  I have personally reviewed the results from the time of this admission to 11/1/2023 11:09 EDT and agree with these findings:  [x]  Laboratory  [x]  Microbiology  [x]  Radiology  []  EKG/Telemetry   []  Cardiology/Vascular   []  Pathology  []  Old records  []  Other:  CMP:        Lab 11/01/23  0937 10/31/23  0844 10/30/23  1212 10/29/23  0121 10/28/23  0625   SODIUM 136 144 148* 142 142   POTASSIUM 4.4 3.9 4.0 4.1 4.5   CHLORIDE 95* 99 103 102 108*   CO2 31.0* 34.0* 28.0 26.0 23.0   ANION GAP 10.0 11.0 17.0* 14.0 11.0   BUN 65* 63* 69* 73* 75*   CREATININE 0.99 1.06* 1.19* 1.15* 0.99   EGFR 57.0* 52.6* 45.7* 47.7* 57.0*   GLUCOSE 98 162* 154* 176* 135*   CALCIUM 9.1 9.3 9.6 9.3 9.0   MAGNESIUM 2.5* 2.6* 2.5* 2.6* 2.7*   TOTAL PROTEIN 6.0 6.1 6.9 6.3 5.5*   ALBUMIN 3.2* 3.3* 3.5 3.2* 3.0*   GLOBULIN 2.8 2.8 3.4 3.1 2.5   ALT (SGPT) 12 11 16 14 16   AST (SGOT) 18 20 20 22 18   BILIRUBIN 0.2 0.2 0.2 0.2 <0.2   ALK PHOS 134* 157* 148* 137* 123*    CBC:      Lab 10/31/23  0844 10/29/23  2331 10/29/23  0800 10/29/23  0324 10/28/23  1344 10/27/23  0428 10/26/23  0310   WBC 39.00* 59.40* 65.20*  60.50* 26.10* 19.40* 21.90*   HEMOGLOBIN 8.2* 9.1* 9.2* 8.7* 9.4* 7.6* 7.9*   HEMATOCRIT 27.1* 29.4* 30.8* 29.3* 29.9* 24.9* 25.7*   PLATELETS 525* 546* 568* 509* 316 250 230   NEUTROS ABS 12.87* 10.69* 11.74* 24.81* 11.22* 6.60 7.45*   EOS ABS 0.78* 1.19*  --   --  0.52* 0.97* 0.66*   MCV 88.5 86.0 88.1 88.1 87.0 88.9 87.2        Wounds (last 24 hours)               Assessment & Plan      Brief Patient Summary:  Diane Guan is a 83 y.o. female with PMH of diabetes, hypertension, breast cancer presented to the hospital after a fall and was admitted with a principal diagnosis of Acute hypoxemic respiratory failure.  On admission, patient required 2 L nasal cannula and subsequently had worsening oxygenation with hypercapnic respiratory failure.  Failed BiPAP and subsequently was intubated on 10/15.  Initially treated with broad-spectrum antibiotics and ID was consulted.  RVP positive for rhinovirus, sputum cultures were negative.  Treated with diuretics for fluid overload.  Developed profound hypoxia and needed deep sedation.  Had bronch on 10/18.  Oxygenation improved with aggressive diuretics.  Sputum cultures grew Klebsiella and treated with meropenem.  Subsequently, extubated on 10/25 but developed hypoxia afterwards, failed BiPAP and had to be reintubated.  Also had LEXUS on admission, nephrology was consulted.  Renal ultrasound with no obstruction.  Treated with diuretics.  Subsequently, urine cultures grew both E. coli and Klebsiella pneumonia.  Treated with Zosyn for 7 days. During the hospital course, patient also developed elevated liver enzymes. Gallbladder ultrasound showed dilated common bile duct measuring 14 mm with distended gallbladder and sludge.  GI was consulted and recommended conservative management.  Liver enzymes subsequently normalized.  Subsequently, family decided on DNR/DNI prior to extubation on 10/27.  Did well with BiPAP.        amLODIPine, 10 mg, Nasogastric, Q24H  chlorthalidone, 25  mg, Nasogastric, Daily  enoxaparin, 40 mg, Subcutaneous, Daily  Ferrous Sulfate, 300 mg, Nasogastric, Once per day on Mon Wed Fri  insulin lispro, 4-24 Units, Subcutaneous, Q6H  lansoprazole, 30 mg, Nasogastric, Q AM  levothyroxine, 100 mcg, Nasogastric, Q AM  losartan, 25 mg, Oral, Q24H  metoclopramide, 5 mg, Nasogastric, Q8H  pantoprazole, 40 mg, Intravenous, Q12H  rOPINIRole, 0.25 mg, Nasogastric, Nightly  rosuvastatin, 10 mg, Nasogastric, Nightly  senna-docusate sodium, 2 tablet, Nasogastric, BID  sertraline, 150 mg, Nasogastric, Daily  sodium chloride, 10 mL, Intravenous, Q12H  sodium chloride, 10 mL, Intravenous, Q12H  sucralfate, 1 g, Nasogastric, 4x Daily AC & at Bedtime             Active Hospital Problems:  Active Hospital Problems    Diagnosis     **Acute hypoxemic respiratory failure     Respiratory failure     Hyperkalemia     Acute respiratory failure     History of breast cancer     Personal history of CLL (chronic lymphocytic leukemia)     History of cigarette smoking     Fall at home     Rhinovirus infection     CKD (chronic kidney disease) stage 3, GFR 30-59 ml/min     COPD (chronic obstructive pulmonary disease)     LIBBY (obstructive sleep apnea)     Vitamin D deficiency     Vitamin B 12 deficiency     Overactive bladder     Primary osteoarthritis involving multiple joints     Mixed hyperlipidemia     Mixed anxiety and depressive disorder     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  Possible contribution from rhinovirus infection with resultant ARDS.  COPD: Acute on chronic.  Not on any home medications.  Bronchodilators as needed.  Failed  extubation and had to be reintubated on 10/25.  Likely due to a combination of COPD, sleep apnea, deconditioning and ARDS.  Respiratory viral panel negative  Respiratory cultures show heavy growth of Klebsiella sensitive to cefepime  Infectious is consulted.  Recommended to continue cefepime for 7 days  Extubated now off BiPAP on 10/27, improving.  Wean off oxygen as tolerated.    scheduled AVAPS during the nighttime and as needed during the daytime.     Septic shock due to UTI / E. coli and Klebsiella bacteremia  RVP: +Rhinovirus  BAL cultures were negative.  Sputum cultures from 10/23 with Klebsiella.  ID following, Patient received 10 days of IV Merrem which ended on 10/31/2023   Shock state has resolved.     Acute on chronic heart failure with preserved EF: Currently well compensated.  Last ECHO showed an EF of 70% with indeterminate LV diastolic function.   On intermittent diuretics, nephrology managing the diuretic regimen.  Monitor Input/Output very closely. Follow daily weights.   Net IO Since Admission: 6,948.95 mL [10/28/23 1112]     Transaminitis with dilated common bile duct  Gallbladder ultrasound with dilated CBD, measuring 14 mm with gall sludge.  GI following.  Liver enzymes normalized.  GI originally consulted for elevated LFTs and reconsulted for coffee-ground NG output.  Hemoglobin 9.1, 9.2 yesterday.    Coffee-ground emesis with NG placement overnight and daughter at bedside reports coffee-ground emesis during admission as well.  Unfortunately, she is a poor candidate for sedation/EGD at this time as she would likely require intubation and she is currently a DNI.  We will start twice daily PPI, Carafate 4 times daily and low-dose Reglan.  Okay for tube feeds as tolerated per dietitian recommendation.  We will monitor hemoglobin.  Plan EGD for life-threatening GI bleed only at this time.  Monitor hemoglobin and transfuse as needed     Acute toxic / metabolic encephalopathy  CT head on 10/14 with  no acute pathology.  Likely due to residual effects of deep sedation for multiple days.  Some ICU delirium as well.  Mentation improving slowly     Acute kidney injury on CKD stage III-non oliguric.  Baseline creatinine of 1.3.  Likely ATN from septic shock.  Nephrology following, on intermittent diuretics.  Monitor Input/Output very closely.   Net IO Since Admission: 6,948.95 mL [10/28/23 1112]     Diabetes mellitus Type 2, not insulin-dependent : well controlled.   Hold home metformin.    Accu checks every 6 hours and c/w humalog coverage as needed.   Last A1c of 6.5.     Essential Hypertension: well controlled.   Resume home chlorthalidone.  Norvasc, Cozaar and irbesartan.  Restart medications as needed.     Iron deficiency anemia: On ferrous sulfate.  Some drop in hemoglobin with FOBT positive.  No gross bleeding, no need for emergent EGD/colonoscopy at this time.  Awaiting CBC from 10/28.     Primary hypothyroidism: Chronic and stable. Continue synthroid.    Dyslipidemia: Chronic and stable.  Continue with statins.    Breast cancer, s/p right mastectomy: Resume home anastrozole when able to swallow pills.    Chronic lymphocytic leukemia>   -oncology n consulting. This was first diagnosed at the end of 2022 and she has not needed treatment since the diagnosis. Her condition is complicated at this time by the severity of the present illness. The sudden and marked increase in the white cell count most likely does not represent worsening of the malignant disease but rather is likely connected to the present illness. At this time, other than additional investigations and support I don't believe she needs intervention. One strong possibility is of hemolytic anemia and I will investigate. 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     Falls at home/functional decline-PT OT likely need nursing home  discharge.    Restless leg syndrome: Continue with Requip.    Anxiety/depression: Continue Zoloft    Severe obstructive sleep apnea: API of 34.5.  Not sure if she uses any home CPAP.  Refused AVAPS in the past.    DVT prophylaxis:  Medical and mechanical DVT prophylaxis orders are present.    CODE STATUS:    Medical Intervention Limits: NO intubation (DNI)  Code Status (Patient has no pulse and is not breathing): No CPR (Do Not Attempt to Resuscitate)  Medical Interventions (Patient has pulse or is breathing): Limited Support  Comments: Spoke with daughter and  at the bedside on 10/27/2023    Disposition:  .  PT OT likely needs nursing home on discharge.    Electronically signed by Ra Myers MD, 11/01/23, 11:09 EDT.  [facility]

## 2023-11-01 NOTE — THERAPY TREATMENT NOTE
Acute Care - Speech Language Pathology   Swallow Treatment Note  [facility]     Patient Name: Diane Guan  : 1940  MRN: [id number]  Today's Date: 2023               Admit Date: 10/10/2023    Visit Dx:     ICD-10-CM ICD-9-CM   1. Hypoxemia  R09.02 799.02   2. Chronic respiratory failure with hypoxia  J96.11 518.83     799.02   3. Rhinovirus infection  B34.8 079.3   4. Dyspnea, unspecified type  R06.00 786.09   5. Cough, unspecified type  R05.9 786.2   6. Rib pain on right side  R07.81 786.50   7. Right low back pain, unspecified chronicity, unspecified whether sciatica present  M54.50 724.2   8. Acute respiratory failure with hypoxia  J96.01 518.81   9. Pneumonia of left lower lobe due to infectious organism  J18.9 486   10. Mucus plugging of bronchi  T17.500A 519.19   11. Acute hypoxemic respiratory failure  J96.01 518.81   12. 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    Acquired hypothyroidism    Type 2 diabetes mellitus with stage 3b chronic kidney disease, without long-term current use of insulin    Insomnia    Postmenopausal status    Shoulder pain    Overactive bladder    Vitamin B 12 deficiency    Seasonal allergies    Absolute anemia    Vitamin D deficiency    LIBBY (obstructive sleep apnea)    Nocturnal hypoxia    Acute respiratory failure with hypoxia and hypercapnia    Abdominal pain    COPD (chronic obstructive pulmonary disease)    CKD (chronic kidney disease) stage 3, GFR 30-59 ml/min    Sepsis due to pneumonia    Acute on chronic respiratory failure with hypoxemia     Class 1 obesity due to excess calories with serious comorbidity and body mass index (BMI) of 32.0 to 32.9 in adult    Acute hypoxemic respiratory failure    History of breast cancer    Personal history of CLL (chronic lymphocytic leukemia)    History of cigarette smoking    Fall at home    Rhinovirus infection    Acute respiratory failure    Hyperkalemia    Respiratory failure     Past Medical History:   Diagnosis Date    Acute bronchitis due to human metapneumovirus 02/08/2020    Anxiety disorder     Arthritis     Breast cancer 02/2019    Invasive ductal carcinoma    Chronic lymphocytic leukemia (CLL), B-cell 01/2019    Depression     Disease of thyroid gland     Diverticulosis of colon 2018    Identified on colonoscopy    Dysphagia 12/2020    Dyspnea on exertion     Fall at home 10/11/2023    Hemorrhoid     Hiatal hernia 12/2020    Hiatal hernia with GERD     large hiatal hernia with high-grade reflux on barium swallow; s/p laparoscopic fundoplication with gastropexy    History of breast cancer 10/11/2023    History of cigarette smoking 10/11/2023    History of diabetes mellitus     no meds now    Hyperlipidemia     Hypertension     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    aspiration pneumonia; completed abx therapy    OAB (overactive bladder)     Personal history of CLL (chronic lymphocytic leukemia) 10/11/2023    Skin cancer     Sleep apnea     daughter states pt does not have and doesn't have machine     Past Surgical History:   Procedure Laterality Date    BLADDER SURGERY      BREAST BIOPSY      BRONCHOSCOPY N/A 09/13/2019    Procedure: BRONCHOSCOPY with bronchial washing;  Surgeon: Marnie Frankel MD;  Location: [facility] ENDOSCOPY;  Service: Pulmonary    BRONCHOSCOPY Bilateral 10/18/2023    Procedure: BRONCHOSCOPY AT BEDSIDE;  Surgeon: Vinicius Heredia DO;  Location: [facility] ENDOSCOPY;  Service: Pulmonary;  Laterality: Bilateral;    COLONOSCOPY  07/19/2018    severe diverticulosis    CYST REMOVAL      Removed a  fatty cyst off of her back    ENDOSCOPY N/A 11/23/2020    Procedure: ESOPHAGOGASTRODUODENOSCOPY with dilatation (Bougie # 48, 50, 52, 54, 56, 58);  Surgeon: Den Plummer DO;  Location: [facility] ENDOSCOPY;  Service: General;  Laterality: N/A;  Post: large hiatal hernia, joshua's ulcers    HIATAL HERNIA REPAIR N/A 12/30/2020    Procedure: Laparoscopic hiatal hernia repair with gastropexy;  Surgeon: Den Plummer DO;  Location: [facility] MAIN OR;  Service: General;  Laterality: N/A;    MASTECTOMY Right 02/04/2019    Invasive ductal carcinoma    TUBAL ABDOMINAL LIGATION         Skilled ST conducted this date in the setting of post extubation dysphagia following 2 intubations totaling 12 days. Session conducted via education with daughter. Pt found sleeping, dtr requesting lights be turned off and to allow pt to continue sleeping. Pt on 8 L via nasal cannula. Events noted overnight in EMR and d/w RNAlise, with reported black tarry stool, c/o nausea and overall pain to nursing staff. TF currently being held; GI continues to follow.  Not appropriate for VFSS this date given the above. Will f/u next date. Discussed modified FWP with the patient's daughter as detailed below; encouraged ice chips.       SLP Recommendation and Plan   Recommend patient remain NPO with modified [place] Water Protocol for bolus size and rate regulation (see below). ST to follow-up to clinically reassess swallow and VFSS candidacy as appropriate. ST POC d/w patient, family and RN who verbalized understanding/agreement.        The rationale to recommend water when a PO diet cannot appropriately/functionally sustain nutrition (or if thickened liquids are prescribed due to aspiration of thin liquids) is because water is low risk for aspiration pna when compared to aspiration of food or other liquids.    Benefits of a water protocol include but are not limited to:      Oral gratification   Engagement of oropharyngeal swallow musculature    Decrease likelihood of dehydration       Guidelines for proper implementation for this patient include:  Thorough oral care prior to consuming water  Upright at 90 degree hip flexion  Small single ice chips at slow rate with 1:1 supervision by nursing staff  Monitor for any changes in respiratory status and discontinue if distress noted     The [place] Free Water Protocol is a research based protocol established in 1984.        The patient's daughter has been educated in the following areas:   Dysphagia (Swallowing Impairment) Oral Care/Hydration NPO rationale, ST POC, rationale for holding re-evaluation this date.        SLP GOALS       Row Name 11/01/23 0900       (LTG) Swallow    (LTG) Swallow pt will improve functional outcome measure score from FOIS LEVEL II (nothing by mouth, minimal attempts at PO) to FOIS LEVEL V or higher (total oral intake of multiple consistencies, requiring special preparation or compensations)  -MC    [place] (Swallow Long Term Goal) with 1:1 assist/ supervision  -MC    Time Frame (Swallow Long Term Goal) by discharge  -MC    Barriers (Swallow Long Term Goal) medical status inhibited particaption in dysphagia intervention or re-evaluation this date.  -MC    Progress/Outcomes (Swallow Long Term Goal) goal ongoing  -MC    Comment (Swallow Long Term Goal) see above note  -MC       (STG) Swallow 1    (STG) Swallow 1 pt will consume PO trials for diagnostic tx at bedside or under fluoroscopy for further assessment oropharyngeal swallow function status in 100% opps  -MC    [place] (Swallow Short Term Goal 1) independently (over 90% accuracy)  -MC    Time Frame (Swallow Short Term Goal 1) 1 week  -MC    Progress/Outcomes (Swallow Short Term Goal 1) goal ongoing  -MC    Comment (Swallow Short Term Goal 1) medical status inhibited particaption in dysphagia intervention or re-evaluation this date. See above  -MC              User Key  (r) = Recorded By, (t) = Taken By, (c) = Cosigned  By      Initials Name Provider Type     Sapphire Box, SLP Speech and Language Pathologist    Jane Levy, Speech Therapy Student SLP Student                       Time Calculation:       Therapy Charges for Today       Code Description Service Date Service Provider Modifiers Qty    [id number] HC ST TREATMENT SWALLOW 5 10/31/2023 Sapphire Box, SLP GN 1                 HARJIT Richards  11/1/2023

## 2023-11-02 ENCOUNTER — APPOINTMENT (OUTPATIENT)
Dept: GENERAL RADIOLOGY | Facility: HOSPITAL | Age: 83
DRG: 207 | End: 2023-11-02
Payer: MEDICARE

## 2023-11-02 LAB
ALBUMIN SERPL-MCNC: 3.4 G/DL (ref 3.5–5.2)
ALBUMIN/GLOB SERPL: 1.3 G/DL
ALP SERPL-CCNC: 137 U/L (ref 39–117)
ALT SERPL W P-5'-P-CCNC: 13 U/L (ref 1–33)
ANION GAP SERPL CALCULATED.3IONS-SCNC: 12 MMOL/L (ref 5–15)
ANISOCYTOSIS BLD QL: ABNORMAL
AST SERPL-CCNC: 24 U/L (ref 1–32)
BILIRUB SERPL-MCNC: 0.2 MG/DL (ref 0–1.2)
BUN SERPL-MCNC: 57 MG/DL (ref 8–23)
BUN/CREAT SERPL: 60.6 (ref 7–25)
CALCIUM SPEC-SCNC: 9 MG/DL (ref 8.6–10.5)
CHLORIDE SERPL-SCNC: 95 MMOL/L (ref 98–107)
CLUMPED PLATELETS: PRESENT
CO2 SERPL-SCNC: 29 MMOL/L (ref 22–29)
CREAT SERPL-MCNC: 0.94 MG/DL (ref 0.57–1)
DACRYOCYTES BLD QL SMEAR: ABNORMAL
DEPRECATED RDW RBC AUTO: 47.3 FL (ref 37–54)
EGFRCR SERPLBLD CKD-EPI 2021: 60.7 ML/MIN/1.73
EOSINOPHIL # BLD MANUAL: 1.67 10*3/MM3 (ref 0–0.4)
EOSINOPHIL NFR BLD MANUAL: 4 % (ref 0.3–6.2)
ERYTHROCYTE [DISTWIDTH] IN BLOOD BY AUTOMATED COUNT: 15.3 % (ref 12.3–15.4)
GLOBULIN UR ELPH-MCNC: 2.6 GM/DL
GLUCOSE BLDC GLUCOMTR-MCNC: 128 MG/DL (ref 70–105)
GLUCOSE BLDC GLUCOMTR-MCNC: 142 MG/DL (ref 70–105)
GLUCOSE BLDC GLUCOMTR-MCNC: 142 MG/DL (ref 70–105)
GLUCOSE BLDC GLUCOMTR-MCNC: 161 MG/DL (ref 70–105)
GLUCOSE SERPL-MCNC: 138 MG/DL (ref 65–99)
HCT VFR BLD AUTO: 27.3 % (ref 34–46.6)
HGB BLD-MCNC: 8.4 G/DL (ref 12–15.9)
IGA SERPL-MCNC: 156 MG/DL (ref 64–422)
IGE SERPL-ACNC: 2 IU/ML (ref 6–495)
IGG SERPL-MCNC: 463 MG/DL (ref 586–1602)
IGM SERPL-MCNC: 21 MG/DL (ref 26–217)
LARGE PLATELETS: ABNORMAL
LYMPHOCYTES # BLD MANUAL: 29.26 10*3/MM3 (ref 0.7–3.1)
MAGNESIUM SERPL-MCNC: 2.5 MG/DL (ref 1.6–2.4)
MCH RBC QN AUTO: 26.2 PG (ref 26.6–33)
MCHC RBC AUTO-ENTMCNC: 30.8 G/DL (ref 31.5–35.7)
MCV RBC AUTO: 85.3 FL (ref 79–97)
METAMYELOCYTES NFR BLD MANUAL: 1 % (ref 0–0)
MICROCYTES BLD QL: ABNORMAL
NEUTROPHILS # BLD AUTO: 10.45 10*3/MM3 (ref 1.7–7)
NEUTROPHILS NFR BLD MANUAL: 25 % (ref 42.7–76)
PLATELET # BLD AUTO: 500 10*3/MM3 (ref 140–450)
PMV BLD AUTO: 9.6 FL (ref 6–12)
POTASSIUM SERPL-SCNC: 4.9 MMOL/L (ref 3.5–5.2)
PROT SERPL-MCNC: 6 G/DL (ref 6–8.5)
RBC # BLD AUTO: 3.2 10*6/MM3 (ref 3.77–5.28)
SCAN SLIDE: NORMAL
SMALL PLATELETS BLD QL SMEAR: ABNORMAL
SMUDGE CELLS BLD QL SMEAR: ABNORMAL
SODIUM SERPL-SCNC: 136 MMOL/L (ref 136–145)
VARIANT LYMPHS NFR BLD MANUAL: 70 % (ref 19.6–45.3)
WBC NRBC COR # BLD: 41.8 10*3/MM3 (ref 3.4–10.8)

## 2023-11-02 PROCEDURE — 74230 X-RAY XM SWLNG FUNCJ C+: CPT

## 2023-11-02 PROCEDURE — 92611 MOTION FLUOROSCOPY/SWALLOW: CPT

## 2023-11-02 PROCEDURE — 97530 THERAPEUTIC ACTIVITIES: CPT

## 2023-11-02 PROCEDURE — 99232 SBSQ HOSP IP/OBS MODERATE 35: CPT | Performed by: INTERNAL MEDICINE

## 2023-11-02 PROCEDURE — 71045 X-RAY EXAM CHEST 1 VIEW: CPT

## 2023-11-02 PROCEDURE — 97110 THERAPEUTIC EXERCISES: CPT

## 2023-11-02 PROCEDURE — 83735 ASSAY OF MAGNESIUM: CPT | Performed by: STUDENT IN AN ORGANIZED HEALTH CARE EDUCATION/TRAINING PROGRAM

## 2023-11-02 PROCEDURE — 63710000001 INSULIN LISPRO (HUMAN) PER 5 UNITS: Performed by: INTERNAL MEDICINE

## 2023-11-02 PROCEDURE — 92526 ORAL FUNCTION THERAPY: CPT

## 2023-11-02 PROCEDURE — 80053 COMPREHEN METABOLIC PANEL: CPT | Performed by: STUDENT IN AN ORGANIZED HEALTH CARE EDUCATION/TRAINING PROGRAM

## 2023-11-02 PROCEDURE — 85025 COMPLETE CBC W/AUTO DIFF WBC: CPT | Performed by: NURSE PRACTITIONER

## 2023-11-02 PROCEDURE — 85007 BL SMEAR W/DIFF WBC COUNT: CPT | Performed by: NURSE PRACTITIONER

## 2023-11-02 PROCEDURE — 25010000002 ENOXAPARIN PER 10 MG: Performed by: INTERNAL MEDICINE

## 2023-11-02 PROCEDURE — 25010000002 LORAZEPAM PER 2 MG: Performed by: INTERNAL MEDICINE

## 2023-11-02 PROCEDURE — C1751 CATH, INF, PER/CENT/MIDLINE: HCPCS

## 2023-11-02 PROCEDURE — 82948 REAGENT STRIP/BLOOD GLUCOSE: CPT

## 2023-11-02 RX ADMIN — INSULIN LISPRO 4 UNITS: 100 INJECTION, SOLUTION INTRAVENOUS; SUBCUTANEOUS at 06:04

## 2023-11-02 RX ADMIN — LEVOTHYROXINE SODIUM 100 MCG: 0.1 TABLET ORAL at 05:21

## 2023-11-02 RX ADMIN — ACETAMINOPHEN 650 MG: 325 TABLET, FILM COATED ORAL at 19:21

## 2023-11-02 RX ADMIN — METOCLOPRAMIDE 5 MG: 5 TABLET ORAL at 01:46

## 2023-11-02 RX ADMIN — ROPINIROLE HYDROCHLORIDE 0.25 MG: 1 TABLET, FILM COATED ORAL at 20:32

## 2023-11-02 RX ADMIN — CHLORTHALIDONE 25 MG: 25 TABLET ORAL at 09:35

## 2023-11-02 RX ADMIN — LANSOPRAZOLE 30 MG: 30 TABLET, ORALLY DISINTEGRATING ORAL at 09:35

## 2023-11-02 RX ADMIN — BARIUM SULFATE 50 ML: 400 SUSPENSION ORAL at 11:45

## 2023-11-02 RX ADMIN — SERTRALINE 150 MG: 100 TABLET, FILM COATED ORAL at 09:35

## 2023-11-02 RX ADMIN — SENNOSIDES AND DOCUSATE SODIUM 2 TABLET: 8.6; 5 TABLET ORAL at 20:32

## 2023-11-02 RX ADMIN — AMLODIPINE BESYLATE 10 MG: 5 TABLET ORAL at 09:35

## 2023-11-02 RX ADMIN — SUCRALFATE 1 G: 1 TABLET ORAL at 17:17

## 2023-11-02 RX ADMIN — SUCRALFATE 1 G: 1 TABLET ORAL at 09:34

## 2023-11-02 RX ADMIN — SUCRALFATE 1 G: 1 TABLET ORAL at 20:32

## 2023-11-02 RX ADMIN — ROSUVASTATIN 10 MG: 10 TABLET, FILM COATED ORAL at 20:32

## 2023-11-02 RX ADMIN — LORAZEPAM 0.5 MG: 2 INJECTION INTRAMUSCULAR; INTRAVENOUS at 05:00

## 2023-11-02 RX ADMIN — METOCLOPRAMIDE 5 MG: 5 TABLET ORAL at 09:35

## 2023-11-02 RX ADMIN — LOSARTAN POTASSIUM 25 MG: 25 TABLET, FILM COATED ORAL at 09:35

## 2023-11-02 RX ADMIN — LANSOPRAZOLE 30 MG: 30 TABLET, ORALLY DISINTEGRATING ORAL at 20:32

## 2023-11-02 RX ADMIN — ENOXAPARIN SODIUM 40 MG: 40 INJECTION, SOLUTION SUBCUTANEOUS at 17:17

## 2023-11-02 NOTE — PROGRESS NOTES
: The patient feeling better today denies complaints renal function improving with improving azotemia    Vital Signs  Vitals:    11/02/23 1716   BP: 143/66   Pulse:    Resp: 18   Temp:    SpO2: 95%     I/O this shift:  In: 1176 [P.O.:60; Other:496; NG/GT:620]  Out: -   I/O last 3 completed shifts:  In: 2000 [Other:1235; NG/GT:765]  Out: 1500 [Urine:1500]      Physical Exam:    General: In no acute distress  HEENT:  Normocephalic, Atraumatic, EOMI, PERRLA, NO icterus,  Neck:  Supple, No JVD, No Carotid artery bruit, No lymphadenopathy  Chest: Decreased breath sounds over bases upper fields clear  Cardiovascular: Regular rate and rhythm. Positive S1 & S2, No rub, murmur or gallop.  Abdominal: Soft, nontender, no palpable organomegaly, no abdominal bruit, positive bowel sounds  Musculoskeletal: No joint tenderness or swelling, good range of motion, Adequate muscle strength on both sides, no cyanosis, clubbing or edema on lower extremities.  Neuro: Alert oriented x3, CN1 - 12 intact, No focal sensory or motor deficit.      Review of Systems:   CNS: Denied headaches, blurred vision, tingling or numbness in any part of body. No weakness or any impaired speech.  CVS: No chest pain or shortness of breath, No orthopnea or PND or exertional dyspnea,  Pulmonary: Denies shortness of breath, coughs, hematemesis, night sweats or weight loss.  GI: Denies diarrhea, nausea, vomiting. Denies weight loss, hematemesis, melena.  : Denies dysuria, frequency or hesitancy of urination  Vascular: Denies claudication, resting pain, tingling numbness or weakness in any part of the body.  Musculoskeletal: Denies Joint tenderness or pain, denies stiffness in joints, denies fever or weight loss, or skin rashes.    Current Labs  [unfilled]  Lab Results (last 24 hours)       Procedure Component Value Units Date/Time    Immunoglobulins A/E/G/M Serum [[id number]]  (Abnormal) Collected: 10/29/23 1333    Specimen: Blood Updated: 11/02/23 8782      IgG 463 mg/dL      IgA 156 mg/dL      IgM 21 mg/dL      Comment: Result confirmed on concentration.        IgE 2 IU/mL     Narrative:      Performed at:  01 - [facility]  [id number]  : Chucky Regan PhD, Phone:  [phone]  Performed at:  02 - [facility] [address]  [id number]  : Rudy Maldonado MD, Phone:  [phone]    POC Glucose Once [[id number]]  (Abnormal) Collected: 11/02/23 1222    Specimen: Blood Updated: 11/02/23 1223     Glucose 128 mg/dL      Comment: Serial Number: [id number]:  [id number]       POC Glucose Once [[id number]]  (Abnormal) Collected: 11/02/23 0600    Specimen: Blood Updated: 11/02/23 0601     Glucose 161 mg/dL      Comment: Serial Number: [id number]:  [id number]       CBC & Differential [[id number]]  (Abnormal) Collected: 11/02/23 0005    Specimen: Blood Updated: 11/02/23 0148    Narrative:      The following orders were created for panel order CBC & Differential.  Procedure                               Abnormality         Status                     ---------                               -----------         ------                     CBC Auto Differential[[id number]]        Abnormal            Final result               Scan Slide[[id number]]                                       Final result                 Please view results for these tests on the individual orders.    CBC Auto Differential [[id number]]  (Abnormal) Collected: 11/02/23 0005    Specimen: Blood Updated: 11/02/23 0148     WBC 41.80 10*3/mm3      RBC 3.20 10*6/mm3      Hemoglobin 8.4 g/dL      Hematocrit 27.3 %      MCV 85.3 fL      MCH 26.2 pg      MCHC 30.8 g/dL      RDW 15.3 %      RDW-SD 47.3 fl      MPV 9.6 fL      Platelets 500 10*3/mm3      Comment: Platelet estimate performed due to platelet clumping.    Modified report. Previous result was 356 10*3/mm3 on 11/2/2023 at 0058 EDT.       Narrative:      The previously  reported component NRBC is no longer being reported. Previous result was 0.3 /100 WBC (Reference Range: 0.0-0.2 /100 WBC) on 11/2/2023 at 0058 EDT.    Scan Slide [[id number]] Collected: 11/02/23 0005    Specimen: Blood Updated: 11/02/23 0148     Scan Slide --     Comment: See Manual Differential Results       Manual Differential [[id number]]  (Abnormal) Collected: 11/02/23 0005    Specimen: Blood Updated: 11/02/23 0148     Neutrophil % 25.0 %      Lymphocyte % 70.0 %      Eosinophil % 4.0 %      Metamyelocyte % 1.0 %      Neutrophils Absolute 10.45 10*3/mm3      Lymphocytes Absolute 29.26 10*3/mm3      Eosinophils Absolute 1.67 10*3/mm3      Anisocytosis Slight/1+     Dacrocytes Slight/1+     Microcytes Slight/1+     Smudge Cells Slight/1+     Platelet Estimate Increased     Clumped Platelets Present     Large Platelets Slight/1+    Narrative:      Reviewed by Pathologist within the past 30 days Electronically signed by Doron Rodriguez MD on 10/11/2023 at 1318 ... 11/2/23 s ..      Magnesium [[id number]]  (Abnormal) Collected: 11/02/23 0005    Specimen: Blood Updated: 11/02/23 0115     Magnesium 2.5 mg/dL     Comprehensive Metabolic Panel [[id number]]  (Abnormal) Collected: 11/02/23 0005    Specimen: Blood Updated: 11/02/23 0115     Glucose 138 mg/dL      BUN 57 mg/dL      Creatinine 0.94 mg/dL      Sodium 136 mmol/L      Potassium 4.9 mmol/L      Comment: Specimen hemolyzed.  Results may be affected.        Chloride 95 mmol/L      CO2 29.0 mmol/L      Calcium 9.0 mg/dL      Total Protein 6.0 g/dL      Albumin 3.4 g/dL      ALT (SGPT) 13 U/L      Comment: Specimen hemolyzed.  Results may be affected.        AST (SGOT) 24 U/L      Comment: Specimen hemolyzed.  Results may be affected.        Alkaline Phosphatase 137 U/L      Total Bilirubin 0.2 mg/dL      Globulin 2.6 gm/dL      A/G Ratio 1.3 g/dL      BUN/Creatinine Ratio 60.6     Anion Gap 12.0 mmol/L      eGFR 60.7 mL/min/1.73     Narrative:      GFR Normal  >60  Chronic Kidney Disease <60  Kidney Failure <15    The GFR formula is only valid for adults with stable renal function between ages 18 and 70.    POC Glucose Once [[id number]]  (Abnormal) Collected: 11/01/23 2359    Specimen: Blood Updated: 11/02/23 0001     Glucose 142 mg/dL      Comment: Serial Number: [id number]:  [id number]             Current Radiology  Imaging Results (Last 24 Hours)       Procedure Component Value Units Date/Time    FL Video Swallow With Speech Single Contrast [[id number]] Resulted: 11/02/23 1148     Updated: 11/02/23 1148    Narrative:      This procedure was auto-finalized with no dictation required.    XR Chest 1 View [[id number]] Collected: 11/02/23 0705     Updated: 11/02/23 0708    Narrative:      XR CHEST 1 VW    Date of Exam: 11/2/2023 12:05 AM EDT    Indication: Hypoxia    Comparison: 11/1/2023    Findings:  Esophagogastric tube tip below the diaphragm. Heart size top normal. Mild elevation the right hemidiaphragm unchanged. Left basilar airspace disease which may relate to atelectasis or pneumonia stable. Suspect small left pleural effusion. The right lung   is clear. Osseous structures intact.      Impression:      Impression:  1. Esophagogastric tube tip below the diaphragm.  2. Persistent left basilar airspace disease which may relate to atelectasis or pneumonia with small left pleural effusion.        Electronically Signed: Pavan Amezquita MD    11/2/2023 7:06 AM EDT    Workstation ID: [id number]    XR Abdomen 1 View [[id number]] Collected: 11/02/23 0008     Updated: 11/02/23 0011    Narrative:      XR ABDOMEN 1 VW    Date of Exam: 11/1/2023 11:45 PM EDT    Indication: replacement of NGT    Comparison: 10/30/2023.    Findings:  Gastric suction tube terminates well within the gastric lumen. There is no distended bowel.      Impression:      Impression:  Gastric suction tube terminates in the gastric lumen.        Electronically Signed: Miguel Trent MD    11/2/2023 12:09 AM EDT     Workstation ID: [id number]          Current Meds    Current Facility-Administered Medications:     acetaminophen (TYLENOL) tablet 650 mg, 650 mg, Nasogastric, Q4H PRN, Vinicius Heredia DO, 650 mg at 11/01/23 2123    amLODIPine (NORVASC) tablet 10 mg, 10 mg, Nasogastric, Q24H, Ra Myers MD, 10 mg at 11/02/23 0935    Barium Sulfate 60 % cream 1 teaspoon(s), 1 teaspoon(s), Oral, Once in imaging, Ra Myers MD    chlorthalidone (HYGROTON) tablet 25 mg, 25 mg, Nasogastric, Daily, Day, Anita G, APRN, 25 mg at 11/02/23 0935    cyclobenzaprine (FLEXERIL) tablet 10 mg, 10 mg, Nasogastric, TID PRN, Vinicius Heredia DO, 10 mg at 11/01/23 2123    dextrose (D50W) (25 g/50 mL) IV injection 25 g, 25 g, Intravenous, Q15 Min PRN, Kalyan Mckeon MD, 25 g at 10/26/23 0614    dextrose (D50W) (25 g/50 mL) IV injection 50 mL, 50 mL, Intravenous, Q1H PRN, Ra Myers MD, 50 mL at 10/14/23 1235    dextrose (GLUTOSE) oral gel 15 g, 15 g, Nasogastric, Q15 Min PRN, Vinicius Heredia DO    Enoxaparin Sodium (LOVENOX) syringe 40 mg, 40 mg, Subcutaneous, Daily, Foreign Bolton MD, 40 mg at 11/02/23 1717    Ferrous Sulfate 300 (60 Fe) MG/5ML solution 300 mg, 300 mg, Nasogastric, Once per day on Mon Wed Fri, Ra Myers MD, 300 mg at 10/30/23 1008    glucagon (GLUCAGEN) injection 1 mg, 1 mg, Intramuscular, Q15 Min PRN, Kalyan Mckeon MD    hydrALAZINE (APRESOLINE) injection 10 mg, 10 mg, Intravenous, Q6H PRN, Ramona Wilder APRN, 10 mg at 10/29/23 0624    insulin lispro (HUMALOG/ADMELOG) injection 4-24 Units, 4-24 Units, Subcutaneous, Q6H, Vinicius Heredia DO, 4 Units at 11/02/23 0604    ipratropium-albuterol (DUO-NEB) nebulizer solution 3 mL, 3 mL, Nebulization, Q6H PRN, Day, Anita G, APRN, 3 mL at 10/30/23 2330    lansoprazole (PREVACID SOLUTAB) disintegrating tablet Tablet Delayed Release Dispersible 30 mg, 30 mg, Nasogastric, BID, Fritz Archibald MD, 30 mg at 11/02/23 0964     levothyroxine (SYNTHROID, LEVOTHROID) tablet 100 mcg, 100 mcg, Nasogastric, Q AM, Day, Anita G, APRN, 100 mcg at 11/02/23 0521    lidocaine (XYLOCAINE) 5 % ointment, , , PRN, Vinicius Heredia DO, 1 application  at 10/18/23 1449    LORazepam (ATIVAN) injection 0.5 mg, 0.5 mg, Intravenous, Q6H PRN, Ra Myers MD, 0.5 mg at 11/02/23 0500    losartan (COZAAR) tablet 25 mg, 25 mg, Oral, Q24H, Ra Myers MD, 25 mg at 11/02/23 0935    metoclopramide (REGLAN) tablet 5 mg, 5 mg, Nasogastric, Q8H, Ra Myers MD, 5 mg at 11/02/23 0935    nitroglycerin (NITROSTAT) SL tablet 0.4 mg, 0.4 mg, Sublingual, Q5 Min PRN, Kalyan Mckeon MD    ondansetron (ZOFRAN) injection 4 mg, 4 mg, Intravenous, Q6H PRN, Kalyan Mckeon MD, 4 mg at 10/31/23 0734    oxyCODONE (ROXICODONE) immediate release tablet 5 mg, 5 mg, Nasogastric, Q6H PRN, Vinicius Heredia DO, 5 mg at 10/30/23 1759    polyethylene glycol (MIRALAX) packet 17 g, 17 g, Oral, Daily PRN, Foreign Bolton MD, 17 g at 10/30/23 2313    rOPINIRole (REQUIP) tablet 0.25 mg, 0.25 mg, Nasogastric, Nightly, Foreign Bolton MD, 0.25 mg at 11/01/23 2123    rosuvastatin (CRESTOR) tablet 10 mg, 10 mg, Nasogastric, Nightly, Day, Anita G, APRN, 10 mg at 11/01/23 2123    sennosides-docusate (PERICOLACE) 8.6-50 MG per tablet 2 tablet, 2 tablet, Nasogastric, BID, Foreign Bolton MD, 2 tablet at 11/01/23 2123    sertraline (ZOLOFT) tablet 150 mg, 150 mg, Nasogastric, Daily, Foreign Bolton MD, 150 mg at 11/02/23 0935    [COMPLETED] Insert Peripheral IV, , , Once **AND** sodium chloride 0.9 % flush 10 mL, 10 mL, Intravenous, PRN, Sheree Field PA-C    sodium chloride 0.9 % flush 10 mL, 10 mL, Intravenous, Q12H, Kalyan Mckeon MD, 10 mL at 11/01/23 2140    sodium chloride 0.9 % flush 10 mL, 10 mL, Intravenous, PRN, Kalyan Mckeon MD    sodium chloride 0.9 % flush 10 mL, 10 mL, Intravenous, Q12H, Anita Cox APRN, 10 mL at 11/01/23 2139    sodium  chloride 0.9 % flush 10 mL, 10 mL, Intravenous, PRN, Day, Anita G, APRN    sodium chloride 0.9 % infusion 40 mL, 40 mL, Intravenous, PRN, Kalyan Mckeon MD    sodium chloride 0.9 % infusion 40 mL, 40 mL, Intravenous, PRN, Day, Anita G, APRN    sucralfate (CARAFATE) tablet 1 g, 1 g, Nasogastric, 4x Daily AC & at Bedtime, Ra Myers MD, 1 g at 11/02/23 7678      Assessment and plan:          Acute hypoxemic respiratory failure    Mixed anxiety and depressive disorder    Primary osteoarthritis involving multiple joints    Hiatal hernia with gastroesophageal reflux    Mixed hyperlipidemia    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    Acquired hypothyroidism    Type 2 diabetes mellitus with stage 3b chronic kidney disease, without long-term current use of insulin    Insomnia    Overactive bladder    Vitamin B 12 deficiency    Vitamin D deficiency    LIBBY (obstructive sleep apnea)    COPD (chronic obstructive pulmonary disease)    CKD (chronic kidney disease) stage 3, GFR 30-59 ml/min    History of breast cancer    Personal history of CLL (chronic lymphocytic leukemia)    History of cigarette smoking    Fall at home    Rhinovirus infection    Acute respiratory failure    Hyperkalemia    Respiratory failure  Acute kidney injury with improving creatinine continue current medications mild azotemia from steroids  Pneumonia UTI and sepsis clinically improving  Immobilization pression probably need physical therapy        Unique Luna MD FACP, FASN.  11/02/23  17:44 EDT

## 2023-11-02 NOTE — CASE MANAGEMENT/SOCIAL WORK
Continued Stay Note   [facility]     Patient Name: Diane Guan  MRN: [id number]  Today's Date: 11/2/2023    Admit Date: 10/10/2023    Plan: [facility] referral pending acceptance. Will required CATHLEEN Nina approved.   Discharge Plan       Row Name 11/02/23 1502       Plan    Plan [facility] referral pending acceptance. Will required CATHLEEN Nina approved.    Plan Comments Messaged Millicent Goode regarding referral. Bed should be available mid next week. Katina is checking on the max amount of 02 a pt can be on. Pt will have video swallow test. MD is aware pt can not go to SNF with NG Tube. Discharge Barriers: toleating NTL, remote sitter, SNF acceptance.           Malinda Coffman RN     phone [phone]  fax [phone]

## 2023-11-02 NOTE — THERAPY TREATMENT NOTE
"Subjective: Pt agreeable to therapeutic plan of care.     Objective:     Bed mobility - Min-A  Transfers - Mod-A: stand pivot to bedside commode  Ambulation -  N/A or Not attempted.    Therapeutic Exercise - 10 Reps B LE AROM supported sitting / chair    Vitals: WNL    Pain: Pt did not rate pain  Intervention for pain: N/A    Education: Provided education on the importance of mobility in the acute care setting, Verbal/Tactile Cues, Transfer Training, and Energy conservation strategies    Assessment: Diane Guan presents with functional mobility impairments which indicate the need for skilled intervention. Pt required mod A to perform stand pivot from bed to bedside chair. Pt expressed feeling dizzy with BP reading WFL. Pt limited due to decreased activity tolerance and functional strength. Tolerating session today without incident. Will continue to follow and progress as tolerated.     Plan/Recommendations:   Moderate Intensity Therapy recommended post-acute care. This is recommended as therapy feels the patient would require 3-4 days per week and wouldn't tolerate \"3 hour daily\" rehab intensity. SNF would be the preferred choice. If the patient does not agree to SNF, arrange HH or OP depending on home bound status. If patient is medically complex, consider LTACH.. Pt requires no DME at discharge.     Pt desires Skilled Rehab placement at discharge. Pt cooperative; agreeable to therapeutic recommendations and plan of care.         Basic Mobility 6-click:  Rollin = Total, A lot = 2, A little = 3; 4 = None  Supine>Sit:   1 = Total, A lot = 2, A little = 3; 4 = None   Sit>Stand with arms:  1 = Total, A lot = 2, A little = 3; 4 = None  Bed>Chair:   1 = Total, A lot = 2, A little = 3; 4 = None  Ambulate in room:  1 = Total, A lot = 2, A little = 3; 4 = None  3-5 Steps with railin = Total, A lot = 2, A little = 3; 4 = None  Score: 11    Modified [place]: N/A = No pre-op stroke/TIA    Post-Tx Position: " Up in Chair, Alarms activated, and Call light and personal items within reach  PPE: gloves and surgical mask

## 2023-11-02 NOTE — PROGRESS NOTES
[facility] Medicine Services   Daily Progress Note    Patient Name: Diane Guan  : 1940  MRN: [id number]  Primary Care Physician:  Asia Queen, SIOMARA  Date of admission: 10/10/2023    Subjective      Chief Complaint: Patient came in after a fall with right rib and right-sided back pain.     History of Present Illness: Diane Guan is a 83 y.o. female with past medical history of DM 2, hypertension and breast cancer who presented to [facility] on 10/10/2023 complaining of fall in the bathroom today with hitting her right side of the rib cage and right back with pain.  She says that her right leg has a little problem giving away at times and gave her while she was in the restroom today and she fell hitting her right side of the chest wall and right back on the commode.  She had pretty significant pain and that is what brought her here.  She says that she had some cough going on for about 3 weeks and was given a Z-Erick with which her cough is getting better.  She denies any fever chills shortness of breath nausea vomiting diarrhea constipation abdominal pain hematemesis hematochezia melena hemoptysis dysuria or hematuria.  Patient received a dose of Dilaudid in the ER and then her oxygen saturation dropped and she had to be started on 2 L of oxygen by nasal cannula.  I believe the Dilaudid caused some respiratory depression on top of her difficulty to take a deep breath anyways because of the pain secondary to fall has caused this acute respiratory failure.     10/29/2023 patient seen and examined in bed no acute distress, family at bedside, blood pressure 180/72.  Dyspnea on 15 L.  We were able to decrease oxygen to 8 L saturation remained 94%.  Discussed with RN.  Tolerating antibiotics.  Afebrile,  10/30/2023 patient seen and examined in bed  patient with significant dyspnea on 10 L, dysphagia, now with NG tube.  Discussed with RN.  Palliative care consulted.  Patient now  DNR.  10/31/23 patient seen and examined in bed acute distress, having significant dyspnea on 15 L of oxygen, vital signs stable, family at bedside, discussed with RN.  Creatinine 1.06  11/1/23 patient seen and examined in bed no acute distress family at bedside, dyspnea on 8 L.  Vital signs stable, patient complains of abdominal pain. now off tube feeding.  11/2/23 patient seen and examined in bed no acute distress, dyspnea on 6 L, still confused.  Discussed with RN.  To have a video swallow today.    Review of Systems   Constitutional: Negative for chills, fever and night sweats.   HENT:  Positive for congestion. Negative for hoarse voice and sore throat.    Eyes:  Negative for blurred vision and double vision.   Cardiovascular:  Positive for chest pain. Negative for dyspnea on exertion, leg swelling, near-syncope, orthopnea, palpitations and syncope.   Respiratory:  Positive for cough. Negative for hemoptysis, shortness of breath, sputum production and wheezing.    Endocrine: Negative for cold intolerance and heat intolerance.   Skin:  Negative for itching and rash.   Musculoskeletal:  Positive for back pain and myalgias. Negative for neck pain and stiffness.        Patient had a fall today and last fall was about a year ago.   Gastrointestinal:  Negative for abdominal pain, constipation, diarrhea, hematemesis, hematochezia, melena, nausea and vomiting.   Genitourinary:  Negative for dysuria, frequency, hematuria and urgency.   Neurological:  Negative for excessive daytime sleepiness, dizziness, focal weakness, headaches, light-headedness, loss of balance and weakness.   Psychiatric/Behavioral:  Negative for altered mental status, depression and hallucinations.       Objective      Vitals:   Temp:  [97.2 °F (36.2 °C)-98 °F (36.7 °C)] 97.4 °F (36.3 °C)  Heart Rate:  [76-83] 83  Resp:  [16-22] 20  BP: (137-155)/(50-70) 155/70  Flow (L/min):  [6-8] 6    Physical Exam     Constitutional:       General: She is awake.  She is not in acute distress.     Appearance: Normal appearance.   HENT:      Mouth/Throat:      Mouth: Mucous membranes are moist.      Pharynx: Oropharynx is clear.   Eyes:      General: No scleral icterus.     Extraocular Movements: Extraocular movements intact.      Conjunctiva/sclera: Conjunctivae normal.   Cardiovascular:      Rate and Rhythm: Normal rate.      Heart sounds: No murmur heard.     No gallop.   Pulmonary:      Breath sounds: Rales (minimal bibasilar) present. No wheezing.   Abdominal:      General: Bowel sounds are normal.      Palpations: Abdomen is soft.      Tenderness: There is no abdominal tenderness.   Musculoskeletal:         General: No tenderness.      Right lower leg: No edema.      Left lower leg: No edema.   Neurological:      Mental Status: She is alert.      Comments: Alert, Awake, oriented to place and person. Generalized weakness   Psychiatric:         Behavior: Behavior is cooperative.          Result Review    Result Review:  I have personally reviewed the results from the time of this admission to 11/2/2023 10:50 EDT and agree with these findings:  [x]  Laboratory  [x]  Microbiology  [x]  Radiology  []  EKG/Telemetry   []  Cardiology/Vascular   []  Pathology  []  Old records  []  Other:  CMP:        Lab 11/02/23  0005 11/01/23  0937 10/31/23  0844 10/30/23  1212 10/29/23  0121   SODIUM 136 136 144 148* 142   POTASSIUM 4.9 4.4 3.9 4.0 4.1   CHLORIDE 95* 95* 99 103 102   CO2 29.0 31.0* 34.0* 28.0 26.0   ANION GAP 12.0 10.0 11.0 17.0* 14.0   BUN 57* 65* 63* 69* 73*   CREATININE 0.94 0.99 1.06* 1.19* 1.15*   EGFR 60.7 57.0* 52.6* 45.7* 47.7*   GLUCOSE 138* 98 162* 154* 176*   CALCIUM 9.0 9.1 9.3 9.6 9.3   MAGNESIUM 2.5* 2.5* 2.6* 2.5* 2.6*   TOTAL PROTEIN 6.0 6.0 6.1 6.9 6.3   ALBUMIN 3.4* 3.2* 3.3* 3.5 3.2*   GLOBULIN 2.6 2.8 2.8 3.4 3.1   ALT (SGPT) 13 12 11 16 14   AST (SGOT) 24 18 20 20 22   BILIRUBIN 0.2 0.2 0.2 0.2 0.2   ALK PHOS 137* 134* 157* 148* 137*    CBC:      Lab  11/02/23  0005 11/01/23  1425 10/31/23  0844 10/29/23  2331 10/29/23  0800 10/29/23  0324 10/28/23  1344   WBC 41.80* 37.60* 39.00* 59.40* 65.20*   < > 26.10*   HEMOGLOBIN 8.4* 8.8* 8.2* 9.1* 9.2*   < > 9.4*   HEMATOCRIT 27.3* 29.1* 27.1* 29.4* 30.8*   < > 29.9*   PLATELETS 500* 483* 525* 546* 568*   < > 316   NEUTROS ABS 10.45* 1.88 12.87* 10.69* 11.74*   < > 11.22*   EOS ABS 1.67* 1.13* 0.78* 1.19*  --   --  0.52*   MCV 85.3 87.3 88.5 86.0 88.1   < > 87.0    < > = values in this interval not displayed.        Wounds (last 24 hours)               Assessment & Plan      Brief Patient Summary:  Diane Guan is a 83 y.o. female with PMH of diabetes, hypertension, breast cancer presented to the hospital after a fall and was admitted with a principal diagnosis of Acute hypoxemic respiratory failure.  On admission, patient required 2 L nasal cannula and subsequently had worsening oxygenation with hypercapnic respiratory failure.  Failed BiPAP and subsequently was intubated on 10/15.  Initially treated with broad-spectrum antibiotics and ID was consulted.  RVP positive for rhinovirus, sputum cultures were negative.  Treated with diuretics for fluid overload.  Developed profound hypoxia and needed deep sedation.  Had bronch on 10/18.  Oxygenation improved with aggressive diuretics.  Sputum cultures grew Klebsiella and treated with meropenem.  Subsequently, extubated on 10/25 but developed hypoxia afterwards, failed BiPAP and had to be reintubated.  Also had LEXUS on admission, nephrology was consulted.  Renal ultrasound with no obstruction.  Treated with diuretics.  Subsequently, urine cultures grew both E. coli and Klebsiella pneumonia.  Treated with Zosyn for 7 days. During the hospital course, patient also developed elevated liver enzymes. Gallbladder ultrasound showed dilated common bile duct measuring 14 mm with distended gallbladder and sludge.  GI was consulted and recommended conservative management.  Liver  enzymes subsequently normalized.  Subsequently, family decided on DNR/DNI prior to extubation on 10/27.  Did well with BiPAP.        amLODIPine, 10 mg, Nasogastric, Q24H  chlorthalidone, 25 mg, Nasogastric, Daily  enoxaparin, 40 mg, Subcutaneous, Daily  Ferrous Sulfate, 300 mg, Nasogastric, Once per day on Mon Wed Fri  insulin lispro, 4-24 Units, Subcutaneous, Q6H  lansoprazole, 30 mg, Nasogastric, BID  levothyroxine, 100 mcg, Nasogastric, Q AM  losartan, 25 mg, Oral, Q24H  metoclopramide, 5 mg, Nasogastric, Q8H  rOPINIRole, 0.25 mg, Nasogastric, Nightly  rosuvastatin, 10 mg, Nasogastric, Nightly  senna-docusate sodium, 2 tablet, Nasogastric, BID  sertraline, 150 mg, Nasogastric, Daily  sodium chloride, 10 mL, Intravenous, Q12H  sodium chloride, 10 mL, Intravenous, Q12H  sucralfate, 1 g, Nasogastric, 4x Daily AC & at Bedtime             Active Hospital Problems:  Active Hospital Problems    Diagnosis     **Acute hypoxemic respiratory failure     Respiratory failure     Hyperkalemia     Acute respiratory failure     History of breast cancer     Personal history of CLL (chronic lymphocytic leukemia)     History of cigarette smoking     Fall at home     Rhinovirus infection     CKD (chronic kidney disease) stage 3, GFR 30-59 ml/min     COPD (chronic obstructive pulmonary disease)     LIBBY (obstructive sleep apnea)     Vitamin D deficiency     Vitamin B 12 deficiency     Overactive bladder     Primary osteoarthritis involving multiple joints     Mixed hyperlipidemia     Mixed anxiety and depressive disorder     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  Possible  contribution from rhinovirus infection with resultant ARDS.  COPD: Acute on chronic.  Not on any home medications.  Bronchodilators as needed.  Failed extubation and had to be reintubated on 10/25.  Likely due to a combination of COPD, sleep apnea, deconditioning and ARDS.  Respiratory viral panel negative  Respiratory cultures show heavy growth of Klebsiella sensitive to cefepime  Infectious is consulted.received 10 days of IV Merrem which ended on 10/31/2023   Extubated now off BiPAP on 10/27, improving.  Wean off oxygen as tolerated.    scheduled AVAPS during the nighttime and as needed during the daytime.     Septic shock due to UTI / E. coli and Klebsiella bacteremia-Shock state has resolved.  RVP: +Rhinovirus  BAL cultures were negative.  Sputum cultures from 10/23 with Klebsiella.  ID following, Patient received 10 days of IV Merrem which ended on 10/31/2023      Acute on chronic heart failure with preserved EF: Currently well compensated.  Last ECHO showed an EF of 70% with indeterminate LV diastolic function.   On intermittent diuretics, nephrology managing the diuretic regimen.  Monitor Input/Output very closely. Follow daily weights.   Net IO Since Admission: 6,948.95 mL [10/28/23 1112]     Transaminitis with dilated common bile duct  Gallbladder ultrasound with dilated CBD, measuring 14 mm with gall sludge.  GI following.  Liver enzymes normalized.  GI originally consulted for elevated LFTs and reconsulted for coffee-ground NG output.  Hemoglobin 9.1, 9.2 yesterday.    Coffee-ground emesis with NG placement overnight and daughter at bedside reports coffee-ground emesis during admission as well.  Unfortunately, she is a poor candidate for sedation/EGD at this time as she would likely require intubation and she is currently a DNI.  We will start twice daily PPI, Carafate 4 times daily and low-dose Reglan.  Okay for tube feeds as tolerated per dietitian recommendation.  We will monitor hemoglobin.  Plan EGD  for life-threatening GI bleed only at this time.  Monitor hemoglobin and transfuse as needed     Acute toxic / metabolic encephalopathy  CT head on 10/14 with no acute pathology.  Likely due to residual effects of deep sedation for multiple days.  Some ICU delirium as well.  Mentation improving slowly     Acute kidney injury on CKD stage III-non oliguric.  Baseline creatinine of 1.3.  Likely ATN from septic shock.  Nephrology following, on intermittent diuretics.  Monitor Input/Output very closely.   Net IO Since Admission: 6,948.95 mL [10/28/23 1112]     Diabetes mellitus Type 2, not insulin-dependent : well controlled.   Hold home metformin.    Accu checks every 6 hours and c/w humalog coverage as needed.   Last A1c of 6.5.     Essential Hypertension: well controlled.   Resume home chlorthalidone.  Norvasc, Cozaar and irbesartan.  Restart medications as needed.     Iron deficiency anemia: On ferrous sulfate.  Some drop in hemoglobin with FOBT positive.  No gross bleeding, no need for emergent EGD/colonoscopy at this time.  Awaiting CBC from 10/28.     Primary hypothyroidism: Chronic and stable. Continue synthroid.    Dyslipidemia: Chronic and stable.  Continue with statins.    Breast cancer, s/p right mastectomy: Resume home anastrozole when able to swallow pills.    Chronic lymphocytic leukemia>   -oncology n consulting. This was first diagnosed at the end of 2022 and she has not needed treatment since the diagnosis. Her condition is complicated at this time by the severity of the present illness. The sudden and marked increase in the white cell count most likely does not represent worsening of the malignant disease but rather is likely connected to the present illness. At this time, other than additional investigations and support I don't believe she needs intervention. One strong possibility is of hemolytic anemia and I will investigate. Also, CLL is frequently complicated by hypogammaglobulinemia that  exacerbates the immune dysfunction and this could explain some of the complications she has had.     Falls at home/functional decline-PT OT likely need nursing home discharge.    Restless leg syndrome: Continue with Requip.    Anxiety/depression: Continue Zoloft    Severe obstructive sleep apnea: API of 34.5.  Not sure if she uses any home CPAP.  Refused AVAPS in the past.    DVT prophylaxis:  Medical and mechanical DVT prophylaxis orders are present.    CODE STATUS:    Medical Intervention Limits: NO intubation (DNI)  Code Status (Patient has no pulse and is not breathing): No CPR (Do Not Attempt to Resuscitate)  Medical Interventions (Patient has pulse or is breathing): Limited Support  Comments: Spoke with daughter and  at the bedside on 10/27/2023    Disposition:  .  PT OT likely needs nursing home on discharge.    Electronically signed by Ra Myers MD, 11/02/23, 10:50 EDT.  [facility] Hospitalist Team

## 2023-11-02 NOTE — MBS/VFSS/FEES
Acute Care - Speech Language Pathology   Swallow Initial Evaluation  [place]     Patient Name: Diane Guan  : 1940  MRN: [id number]  Today's Date: 2023               Admit Date: 10/10/2023    Visit Dx:     ICD-10-CM ICD-9-CM   1. Hypoxemia  R09.02 799.02   2. Chronic respiratory failure with hypoxia  J96.11 518.83     799.02   3. Rhinovirus infection  B34.8 079.3   4. Dyspnea, unspecified type  R06.00 786.09   5. Cough, unspecified type  R05.9 786.2   6. Rib pain on right side  R07.81 786.50   7. Right low back pain, unspecified chronicity, unspecified whether sciatica present  M54.50 724.2   8. Acute respiratory failure with hypoxia  J96.01 518.81   9. Pneumonia of left lower lobe due to infectious organism  J18.9 486   10. Mucus plugging of bronchi  T17.500A 519.19   11. Acute hypoxemic respiratory failure  J96.01 518.81   12. 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    Acquired hypothyroidism    Type 2 diabetes mellitus with stage 3b chronic kidney disease, without long-term current use of insulin    Insomnia    Postmenopausal status    Shoulder pain    Overactive bladder    Vitamin B 12 deficiency    Seasonal allergies    Absolute anemia    Vitamin D deficiency    LIBBY (obstructive sleep apnea)    Nocturnal hypoxia    Acute respiratory failure with hypoxia and hypercapnia    Abdominal pain    COPD (chronic obstructive pulmonary disease)    CKD (chronic kidney disease) stage 3, GFR 30-59 ml/min    Sepsis due to pneumonia    Acute on chronic respiratory failure with hypoxemia     Class 1 obesity due to excess calories with serious comorbidity and body mass index (BMI) of 32.0 to 32.9 in adult    Acute hypoxemic respiratory failure    History of breast cancer    Personal history of CLL (chronic lymphocytic leukemia)    History of cigarette smoking    Fall at home    Rhinovirus infection    Acute respiratory failure    Hyperkalemia    Respiratory failure     Past Medical History:   Diagnosis Date    Acute bronchitis due to human metapneumovirus 02/08/2020    Anxiety disorder     Arthritis     Breast cancer 02/2019    Invasive ductal carcinoma    Chronic lymphocytic leukemia (CLL), B-cell 01/2019    Depression     Disease of thyroid gland     Diverticulosis of colon 2018    Identified on colonoscopy    Dysphagia 12/2020    Dyspnea on exertion     Fall at home 10/11/2023    Hemorrhoid     Hiatal hernia 12/2020    Hiatal hernia with GERD     large hiatal hernia with high-grade reflux on barium swallow; s/p laparoscopic fundoplication with gastropexy    History of breast cancer 10/11/2023    History of cigarette smoking 10/11/2023    History of diabetes mellitus     no meds now    Hyperlipidemia     Hypertension     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    aspiration pneumonia; completed abx therapy    OAB (overactive bladder)     Personal history of CLL (chronic lymphocytic leukemia) 10/11/2023    Skin cancer     Sleep apnea     daughter states pt does not have and doesn't have machine     Past Surgical History:   Procedure Laterality Date    BLADDER SURGERY      BREAST BIOPSY      BRONCHOSCOPY N/A 09/13/2019    Procedure: BRONCHOSCOPY with bronchial washing;  Surgeon: Marnie Frankel MD;  Location: [facility] ENDOSCOPY;  Service: Pulmonary    BRONCHOSCOPY Bilateral 10/18/2023    Procedure: BRONCHOSCOPY AT BEDSIDE;  Surgeon: Vinicius Heredia DO;  Location: [facility] ENDOSCOPY;  Service: Pulmonary;  Laterality: Bilateral;    COLONOSCOPY  07/19/2018    severe diverticulosis    CYST REMOVAL      Removed a  fatty cyst off of her back    ENDOSCOPY N/A 11/23/2020    Procedure: ESOPHAGOGASTRODUODENOSCOPY with dilatation (Bougie # 48, 50, 52, 54, 56, 58);  Surgeon: Den Plummer DO;  Location: [facility] ENDOSCOPY;  Service: General;  Laterality: N/A;  Post: large hiatal hernia, joshua's ulcers    HIATAL HERNIA REPAIR N/A 12/30/2020    Procedure: Laparoscopic hiatal hernia repair with gastropexy;  Surgeon: Den Plummer DO;  Location: [facility] MAIN OR;  Service: General;  Laterality: N/A;    MASTECTOMY Right 02/04/2019    Invasive ductal carcinoma    TUBAL ABDOMINAL LIGATION         SLP Recommendation and Plan  SLP Swallowing Diagnosis: mod-severe, oral dysphagia, pharyngeal dysphagia (11/02/23 1300)  SLP Diet Recommendation: puree, nectar thick liquids (11/02/23 1300)  Recommended Precautions and Strategies: upright posture during/after eating, small bites of food and sips of liquid, general aspiration precautions (11/02/23 1300)  SLP Rec. for Method of Medication Administration: meds crushed, with puree (11/02/23 1300)     Monitor for Signs of Aspiration: yes, cough (11/02/23 1300)  Recommended Diagnostics: reassess via clinical swallow evaluation, reassess [place] (11/02/23 1300)  Swallow Criteria for Skilled Therapeutic Interventions Met: demonstrates skilled criteria (11/02/23 1300)  Anticipated Discharge Disposition (SLP): anticipate therapy at next level of care (11/02/23 1300)     Therapy Frequency (Swallow): PRN (11/02/23 1300)  Predicted Duration Therapy Intervention (Days): until discharge (11/02/23 1300)  Oral Care Recommendations: Oral Care BID/PRN, Oral Care before breakfast, after meals and PRN (11/02/23 1300)                                      Oral Care Recommendations: Oral Care BID/PRN, Oral Care before breakfast, after meals and PRN (11/02/23 1300)           SWALLOW EVALUATION (last 72 hours)       SLP Adult Swallow Evaluation       Row Name 11/02/23 1300       Rehab Evaluation    Document Type  re-evaluation;evaluation  -EC    Subjective Information no complaints  -EC    Patient Observations alert;cooperative;agree to therapy  -EC    Patient Effort good  -EC    Comment Pt seen this AM for re-evaluation of swallow and VFSS readiness. Pt asleep upon entry though awakens w/repositioning and mod cueing. Once awake, pt maintains alertness through re-eval. Pt administered trials of thin water via toothette, ice chips, thin water via spoon and thin water via straw. Oal transit appears WNL and no overt s/s of aspiration are demonstrated. Recommend continue w/plan for VFSS d/t recent hx of 2 intubations totaling 12 days, prolonged NPO status and ongoing increased O2 requirements. Pt on 8L this date. Please see below for VFSS results and recommendations.  -EC    Symptoms Noted During/After Treatment none  -EC       General Information    Patient Profile Reviewed yes  -EC       MBS/VFSS    Utensils Used spoon;cup;straw  -EC    Consistencies Trialed chopped;mixed consistency;pureed;thin liquids;nectar/syrup-thick liquids  -EC       MBS/VFSS Interpretation    VFSS Summary Video fluoroscopic swallow study conducted in the lateral position with pt seated upright. Pt was administered trials and self fed trials as provided by SLP respectively: thin liquid by spoon x 2, ntl by spoon x2, ntl by cup x1, ntl by straw x1, puree (applesauce) x 2, mechanical soft/mixed consistency (peaches) x 1 & ntl by straw x1.      Across all trials pt presents w/decreased anterior hyoid movement and epiglottic deflection. Pt noted w/deep penetration of thin liquid and mild, transient penetration on 1/5 trials of ntl. No penetration or aspiration w/puree. Mark aspiration of peaches from the pyriforms during the swallow. No remarkable cricopharyngeal or UES findings. Recommend a puree and ntl diet, meds crushed in puree.     Detailed results as follows:  THIN: Spillage directly to the valleculae and pyriforms w/deep penetration before and  during the swallow. NTL: Spillage to the valleculae. Mild transient penetration noted 1/5 trials. PUREE: Spillage to the valleculae w/no penetration or aspiration, mild vallecular residue. MECHANICAL SOFT MIXED CONSISTENCY: Significantly prolonged mastication and oral transit w/spillage to the valleculae and pyriforms. Pt aspirates a significant amount of the bolus from the pyriforms during the swallow. Pt coughs in response to aspiration.  -EC       SLP Evaluation Clinical Impression    SLP Swallowing Diagnosis mod-severe;oral dysphagia;pharyngeal dysphagia  -EC    Functional Impact risk of aspiration/pneumonia  -EC    Swallow Criteria for Skilled Therapeutic Interventions Met demonstrates skilled criteria  -EC       SLP Treatment Clinical Impressions    Care Plan Review evaluation/treatment results reviewed;care plan/treatment goals reviewed;risks/benefits reviewed;patient/other agree to care plan  -EC       Recommendations    Therapy Frequency (Swallow) PRN  -EC    Predicted Duration Therapy Intervention (Days) until discharge  -EC    SLP Diet Recommendation puree;nectar thick liquids  -EC    Recommended Diagnostics reassess via clinical swallow evaluation;reassess via VFSS (MBS)  -EC    Recommended Precautions and Strategies upright posture during/after eating;small bites of food and sips of liquid;general aspiration precautions  -EC    Oral Care Recommendations Oral Care BID/PRN;Oral Care before breakfast, after meals and PRN  -EC    SLP Rec. for Method of Medication Administration meds crushed;with puree  -EC    Monitor for Signs of Aspiration yes;cough  -EC    Anticipated Discharge Disposition (SLP) anticipate therapy at next level of care  -EC       Swallow Goals (SLP)    Swallow LTGs Swallow Long Term Goal (free text)  -EC    Swallow STGs diet tolerance goal selection (SLP);pharyngeal strengthening exercise goal selection (SLP);swallow management recall goal selection (SLP)  -EC    Diet Tolerance Goal  Selection (SLP) Swallow Short Term Goal 1  -EC    Pharyngeal Strengthening Exercise Goal Selection (SLP) pharyngeal strengthening exercise, SLP goal 1  -EC    Swallow Management Recall Goal Selection (SLP) swallow management recall, SLP goal 1  -EC       (LTG) Swallow    (LTG) Swallow pt will improve functional outcome measure score from FOIS LEVEL II (nothing by mouth, minimal attempts at PO) to FOIS LEVEL V or higher (total oral intake of multiple consistencies, requiring special preparation or compensations)  -EC    [place] (Swallow Long Term Goal) with 1:1 assist/ supervision  -EC    Time Frame (Swallow Long Term Goal) by discharge  -EC    Progress/Outcomes (Swallow Long Term Goal) good progress toward goal  -EC       (STG) Swallow 1    (STG) Swallow 1 pt will consume PO trials for diagnostic tx at bedside or under fluoroscopy for further assessment oropharyngeal swallow function status in 100% opps  -EC    [place] (Swallow Short Term Goal 1) independently (over 90% accuracy)  -EC    Time Frame (Swallow Short Term Goal 1) 1 week  -EC    Progress/Outcomes (Swallow Short Term Goal 1) goal met  -EC       (STG) Pharyngeal Strengthening Exercise Goal 1 (SLP)    Activity (Pharyngeal Strengthening Goal 1, SLP) increase epiglottic inversion and retroflexion;increase closure at entrance to airway/closure of airway at glottis  -EC    Increase Epiglottic Inversion and Retroflexion Mendelsohn;falsetto;hard effortful swallow  -EC    Increase Closure at Entrance to Airway/Closure of Airway at Glottis supraglottic swallow;falsetto;hard effortful swallow  -EC    [place]/Accuracy (Pharyngeal Strengthening Goal 1, SLP) with maximum cues (25-49% accuracy)  -EC    Time Frame (Pharyngeal Strengthening Goal 1, SLP) short term goal (STG)  -EC    Progress/Outcomes (Pharyngeal Strengthening Goal 1, SLP) new goal  -EC       (STG) Swallow Management Recall Goal 1 (SLP)    Activity (Swallow Management Recall Goal 1, SLP)  independent recall of;aspiration precautions;oral care recommendations;safe diet/liquid level;safe diet level/texture  -EC    [place]/Accuracy (Swallow Management Recall Goal 1, SLP) with maximum cues (25-49% accuracy)  -EC    Time Frame (Swallow Management Recall Goal 1, SLP) short term goal (STG)  -EC    Progress/Outcomes (Swallow Management Recall Goal 1, SLP) new goal  -EC              User Key  (r) = Recorded By, (t) = Taken By, (c) = Cosigned By      Initials Name Effective Dates    EC Cara Murcia 06/16/21 -     Sapphire Sidhu, SLP 08/16/22 -     Jane Levy, Speech Therapy Student 08/23/23 -                     EDUCATION  The patient has been educated in the following areas:   Dysphagia (Swallowing Impairment).        SLP GOALS       Row Name 11/02/23 1300 11/01/23 0900 10/31/23 1327       (LTG) Swallow    (LTG) Swallow pt will improve functional outcome measure score from FOIS LEVEL II (nothing by mouth, minimal attempts at PO) to FOIS LEVEL V or higher (total oral intake of multiple consistencies, requiring special preparation or compensations)  -EC pt will improve functional outcome measure score from FOIS LEVEL II (nothing by mouth, minimal attempts at PO) to FOIS LEVEL V or higher (total oral intake of multiple consistencies, requiring special preparation or compensations)  -MC pt will improve functional outcome measure score from FOIS LEVEL II (nothing by mouth, minimal attempts at PO) to FOIS LEVEL V or higher (total oral intake of multiple consistencies, requiring special preparation or compensations)  -MC (r) KP (t) MC (c)    [place] (Swallow Long Term Goal) with 1:1 assist/ supervision  -EC with 1:1 assist/ supervision  -MC with 1:1 assist/ supervision  -MC (r) KP (t) MC (c)    Time Frame (Swallow Long Term Goal) by discharge  -EC by discharge  -MC by discharge  -MC (r) KP (t) MC (c)    Bar[place] (Swallow Long Term Goal) -- medical status inhibited particaption in dysphagia  intervention or re-evaluation this date.  -MC --    Progress/Outcomes (Swallow Long Term Goal) good progress toward goal  -EC goal ongoing  -MC goal ongoing  -MC (r) KP (t) MC (c)    Comment (Swallow Long Term Goal) -- see above note  -MC --       (STG) Swallow 1    (STG) Swallow 1 pt will consume PO trials for diagnostic tx at bedside or under fluoroscopy for further assessment oropharyngeal swallow function status in 100% opps  -EC pt will consume PO trials for diagnostic tx at bedside or under fluoroscopy for further assessment oropharyngeal swallow function status in 100% opps  -MC pt will consume PO trials for diagnostic tx at bedside or under fluoroscopy for further assessment oropharyngeal swallow function status in 100% opps  -MC (r) KP (t) MC (c)    [place] (Swallow Short Term Goal 1) independently (over 90% accuracy)  -EC independently (over 90% accuracy)  -MC independently (over 90% accuracy)  -MC (r) KP (t) MC (c)    Time Frame (Swallow Short Term Goal 1) 1 week  -EC 1 week  -MC 1 week  -MC (r) KP (t) MC (c)    Progress/Outcomes (Swallow Short Term Goal 1) goal met  -EC goal ongoing  -MC goal ongoing  -MC (r) KP (t) MC (c)    Comment (Swallow Short Term Goal 1) -- medical status inhibited particaption in dysphagia intervention or re-evaluation this date. See above  -MC --       (STG) Pharyngeal Strengthening Exercise Goal 1 (SLP)    Activity (Pharyngeal Strengthening Goal 1, SLP) increase epiglottic inversion and retroflexion;increase closure at entrance to airway/closure of airway at glottis  -EC -- --    Increase Epiglottic Inversion and Retroflexion Mendelsohn;falsetto;hard effortful swallow  -EC -- --    Increase Closure at Entrance to Airway/Closure of Airway at Glottis supraglottic swallow;falsetto;hard effortful swallow  -EC -- --    [place]/Accuracy (Pharyngeal Strengthening Goal 1, SLP) with maximum cues (25-49% accuracy)  -EC -- --    Time Frame (Pharyngeal Strengthening Goal 1, SLP)  short term goal (STG)  -EC -- --    Progress/Outcomes (Pharyngeal Strengthening Goal 1, SLP) new goal  -EC -- --       (STG) Swallow Management Recall Goal 1 (SLP)    Activity (Swallow Management Recall Goal 1, SLP) independent recall of;aspiration precautions;oral care recommendations;safe diet/liquid level;safe diet level/texture  -EC -- --    [place]/Accuracy (Swallow Management Recall Goal 1, SLP) with maximum cues (25-49% accuracy)  -EC -- --    Time Frame (Swallow Management Recall Goal 1, SLP) short term goal (STG)  -EC -- --    Progress/Outcomes (Swallow Management Recall Goal 1, SLP) new goal  -EC -- --              User Key  (r) = Recorded By, (t) = Taken By, (c) = Cosigned By      Initials Name Provider Type    Cara Barclay Speech and Language Pathologist    Sapphire Sidhu, SLP Speech and Language Pathologist    Jane Levy, Speech Therapy Student SLP Student                       Time Calculation:                Cara Murcia  11/2/2023

## 2023-11-02 NOTE — PROGRESS NOTES
Nutrition Services  Patient Name: Diane Guan  YOB: 1940  MRN: [id number]  Admission date: 10/10/2023    PPE Documentation        PPE Worn By Provider Did not enter room for this encounter   PPE Worn By Patient  N/A     PROGRESS NOTE      Encounter Information: Progress note to check on TF.  NG replaced after patient pulled it out overnight. ST followed up with pt yesterday and still recommends NPO with alternative route for nutrition.        PO Diet: Diet: Regular/House Diet; Texture: Pureed (NDD 1); Fluid Consistency: Nectar Thick   PO Supplements: None ordered    PO Intake:  NPO       Current nutrition support: Nutren 1.5 at 55 mL/hour + 45 mL/hour water flush    Nutrition support review: Infusing as above, per documentation        Labs (reviewed below): Reviewed, management per attending        GI Function:  Last documented BM 11/2 (today)  Residuals WNL       Nutrition Intervention Updates: Continue current EN prescription, at goal        Results from last 7 days   Lab Units 11/02/23  0005 11/01/23  0937 10/31/23  0844   SODIUM mmol/L 136 136 144   POTASSIUM mmol/L 4.9 4.4 3.9   CHLORIDE mmol/L 95* 95* 99   CO2 mmol/L 29.0 31.0* 34.0*   BUN mg/dL 57* 65* 63*   CREATININE mg/dL 0.94 0.99 1.06*   CALCIUM mg/dL 9.0 9.1 9.3   BILIRUBIN mg/dL 0.2 0.2 0.2   ALK PHOS U/L 137* 134* 157*   ALT (SGPT) U/L 13 12 11   AST (SGOT) U/L 24 18 20   GLUCOSE mg/dL 138* 98 162*     Results from last 7 days   Lab Units 11/02/23  0005 11/01/23  1425 11/01/23  0937 10/31/23  0844 10/28/23  0625 10/27/23  0428   MAGNESIUM mg/dL 2.5*  --  2.5* 2.6*   < > 2.5*   HEMOGLOBIN g/dL 8.4*   < >  --  8.2*   < > 7.6*   HEMATOCRIT % 27.3*   < >  --  27.1*   < > 24.9*   TRIGLYCERIDES mg/dL  --   --   --   --   --  407*    < > = values in this interval not displayed.     COVID19   Date Value Ref Range Status   10/29/2023 Not Detected Not Detected - Ref. Range Final     Lab Results   Component Value Date    HGBA1C 6.50 (H)  10/13/2023     RD to follow up per protocol.    Electronically signed by:  Vernell Gil RD  11/02/23 13:11 EDT

## 2023-11-02 NOTE — PROGRESS NOTES
Hematology/Oncology Inpatient Progress Note    PATIENT NAME: Diane Guan  : 1940  MRN: [id number]    CHIEF COMPLAINT: Falls, respiratory failure, leukocytosis with lymphocytosis    HISTORY OF PRESENT ILLNESS:      Diane Guan is a 83 y.o. female who presented to [facility] on 10/10/2023 with complaints of      MsZachery Guan was admitted to the hospital on 10/10/2023. She had fallen, sustaining some trauma to the chest. She complained of weakness of the right lower extremity. In addition she had a respiratory panel positive for rhinovirus. She had been treated in the past for early stage breast cancer and had remained free of evidence of recurrent disease. In addition she was known for chronic lymphocytic leukemia and had maintained significant lymphocytosis for some time but had never needed treatment. At the time of the admission she had maintained a similar white cell count. Today for the first time she had a very significant increase in the total white cell count and platelets. In addition she had significant thrombocytosis.   In spite of all efforts she had very slow improvement. She had continued to have difficulties with weakness and anorexia. She developed pneumonia and had to be intubated; she was finally extubated on 10/27.  On 10/23/2023 she was found to have positive blood cultures for E coli and Klebsiella. At the time of this consult she was still on antibiotics. She also had had evidence of gastrointestinal bleeding; she was diagnosed with iron deficiency and was started on iron but iron studies were not available.      He/She  has a past medical history of Acute bronchitis due to human metapneumovirus (2020), Anxiety disorder, Arthritis, Breast cancer (2019), Chronic lymphocytic leukemia (CLL), B-cell (2019), Depression, Disease of thyroid gland, Diverticulosis of colon (), Dysphagia (2020), Dyspnea on exertion, Fall at home (10/11/2023),  Hemorrhoid, Hiatal hernia (12/2020), Hiatal hernia with GERD, History of breast cancer (10/11/2023), History of cigarette smoking (10/11/2023), History of diabetes mellitus, Hyperlipidemia, Hypertension, Mycobacterium avium complex (02/2019), OAB (overactive bladder), Personal history of CLL (chronic lymphocytic leukemia) (10/11/2023), Skin cancer, and Sleep apnea.     PCP: Asia Queen, SOIMARA    Subjective     No new issues    ROS:  Review of Systems   Constitutional:  Positive for activity change, appetite change and fatigue. Negative for chills and fever.   HENT:  Negative for congestion, drooling, ear discharge, rhinorrhea, sinus pressure and tinnitus.    Eyes:  Negative for photophobia, pain and discharge.   Respiratory:  Negative for apnea, choking and stridor.    Cardiovascular:  Negative for palpitations.   Gastrointestinal:  Negative for abdominal distention, abdominal pain and anal bleeding.   Endocrine: Negative for polydipsia and polyphagia.   Genitourinary:  Negative for decreased urine volume, flank pain and genital sores.   Musculoskeletal:  Negative for gait problem, neck pain and neck stiffness.   Skin:  Negative for color change, rash and wound.   Neurological:  Positive for weakness. Negative for tremors, seizures, syncope, facial asymmetry and speech difficulty.   Hematological:  Negative for adenopathy.   Psychiatric/Behavioral:  Negative for agitation, confusion, hallucinations and self-injury. The patient is not hyperactive.         MEDICATIONS:    Scheduled Meds:  amLODIPine, 10 mg, Nasogastric, Q24H  Barium Sulfate, 1 teaspoon(s), Oral, Once in imaging  chlorthalidone, 25 mg, Nasogastric, Daily  enoxaparin, 40 mg, Subcutaneous, Daily  Ferrous Sulfate, 300 mg, Nasogastric, Once per day on Mon Wed Fri  insulin lispro, 4-24 Units, Subcutaneous, Q6H  lansoprazole, 30 mg, Nasogastric, BID  levothyroxine, 100 mcg, Nasogastric, Q AM  losartan, 25 mg, Oral, Q24H  metoclopramide, 5 mg,  "Nasogastric, Q8H  rOPINIRole, 0.25 mg, Nasogastric, Nightly  rosuvastatin, 10 mg, Nasogastric, Nightly  senna-docusate sodium, 2 tablet, Nasogastric, BID  sertraline, 150 mg, Nasogastric, Daily  sodium chloride, 10 mL, Intravenous, Q12H  sodium chloride, 10 mL, Intravenous, Q12H  sucralfate, 1 g, Nasogastric, 4x Daily AC & at Bedtime       Continuous Infusions:      PRN Meds:    acetaminophen    cyclobenzaprine    dextrose    dextrose    dextrose    glucagon (human recombinant)    hydrALAZINE    ipratropium-albuterol    lidocaine    LORazepam    nitroglycerin    ondansetron    oxyCODONE    polyethylene glycol    [COMPLETED] Insert Peripheral IV **AND** sodium chloride    sodium chloride    sodium chloride    sodium chloride    sodium chloride     ALLERGIES:  No Known Allergies    Objective    VITALS:   /66 (BP Location: Left arm, Patient Position: Lying)   Pulse 83   Temp 98.1 °F (36.7 °C) (Oral)   Resp 18   Ht 160 cm (63\")   Wt 78.3 kg (172 lb 9.9 oz)   SpO2 95%   BMI 30.58 kg/m²     PHYSICAL EXAM: (performed by MD)  Physical Exam  Constitutional:       General: She is in acute distress.      Appearance: She is ill-appearing.   HENT:      Head:      Comments: Ng tube in palce     Mouth/Throat:      Mouth: Mucous membranes are moist.      Pharynx: Oropharynx is clear.   Eyes:      Extraocular Movements: Extraocular movements intact.      Pupils: Pupils are equal, round, and reactive to light.   Pulmonary:      Effort: Pulmonary effort is normal.   Neurological:      General: No focal deficit present.      Mental Status: Mental status is at baseline.     I have reexamined the patient and the results are consistent with the previously documented exam. Ling Bolden MD      RECENT LABS:  Lab Results (last 24 hours)       Procedure Component Value Units Date/Time    POC Glucose Once [[id number]]  (Abnormal) Collected: 11/02/23 1749    Specimen: Blood Updated: 11/02/23 1751     Glucose 142 mg/dL      " Comment: Serial Number: [id number]:  [id number]       Immunoglobulins A/E/G/M Serum [[id number]]  (Abnormal) Collected: 10/29/23 1333    Specimen: Blood Updated: 11/02/23 1309     IgG 463 mg/dL      IgA 156 mg/dL      IgM 21 mg/dL      Comment: Result confirmed on concentration.        IgE 2 IU/mL     Narrative:      Performed at:  01 - [facility]  [id number]  : Chucky Regan PhD, Phone:  [phone]  Performed at:  02 - [facility]  [id number]  : Rudy Maldonado MD, Phone:  [phone]    POC Glucose Once [[id number]]  (Abnormal) Collected: 11/02/23 1222    Specimen: Blood Updated: 11/02/23 1223     Glucose 128 mg/dL      Comment: Serial Number: [id number]:  [id number]       POC Glucose Once [[id number]]  (Abnormal) Collected: 11/02/23 0600    Specimen: Blood Updated: 11/02/23 0601     Glucose 161 mg/dL      Comment: Serial Number: [id number]:  [id number]       CBC & Differential [[id number]]  (Abnormal) Collected: 11/02/23 0005    Specimen: Blood Updated: 11/02/23 0148    Narrative:      The following orders were created for panel order CBC & Differential.  Procedure                               Abnormality         Status                     ---------                               -----------         ------                     CBC Auto Differential[[id number]]        Abnormal            Final result               Scan Slide[[id number]]                                       Final result                 Please view results for these tests on the individual orders.    CBC Auto Differential [[id number]]  (Abnormal) Collected: 11/02/23 0005    Specimen: Blood Updated: 11/02/23 0148     WBC 41.80 10*3/mm3      RBC 3.20 10*6/mm3      Hemoglobin 8.4 g/dL      Hematocrit 27.3 %      MCV 85.3 fL      MCH 26.2 pg      MCHC 30.8 g/dL      RDW 15.3 %      RDW-SD 47.3 fl      MPV 9.6 fL      Platelets 500 10*3/mm3       Comment: Platelet estimate performed due to platelet clumping.    Modified report. Previous result was 356 10*3/mm3 on 11/2/2023 at 0058 EDT.       Narrative:      The previously reported component NRBC is no longer being reported. Previous result was 0.3 /100 WBC (Reference Range: 0.0-0.2 /100 WBC) on 11/2/2023 at 0058 EDT.    Scan Slide [[id number]] Collected: 11/02/23 0005    Specimen: Blood Updated: 11/02/23 0148     Scan Slide --     Comment: See Manual Differential Results       Manual Differential [[id number]]  (Abnormal) Collected: 11/02/23 0005    Specimen: Blood Updated: 11/02/23 0148     Neutrophil % 25.0 %      Lymphocyte % 70.0 %      Eosinophil % 4.0 %      Metamyelocyte % 1.0 %      Neutrophils Absolute 10.45 10*3/mm3      Lymphocytes Absolute 29.26 10*3/mm3      Eosinophils Absolute 1.67 10*3/mm3      Anisocytosis Slight/1+     Dacrocytes Slight/1+     Microcytes Slight/1+     Smudge Cells Slight/1+     Platelet Estimate Increased     Clumped Platelets Present     Large Platelets Slight/1+    Narrative:      Reviewed by Pathologist within the past 30 days Electronically signed by Doron Rodriguez MD on 10/11/2023 at 1318 ... 11/2/23 s ..      Magnesium [[id number]]  (Abnormal) Collected: 11/02/23 0005    Specimen: Blood Updated: 11/02/23 0115     Magnesium 2.5 mg/dL     Comprehensive Metabolic Panel [[id number]]  (Abnormal) Collected: 11/02/23 0005    Specimen: Blood Updated: 11/02/23 0115     Glucose 138 mg/dL      BUN 57 mg/dL      Creatinine 0.94 mg/dL      Sodium 136 mmol/L      Potassium 4.9 mmol/L      Comment: Specimen hemolyzed.  Results may be affected.        Chloride 95 mmol/L      CO2 29.0 mmol/L      Calcium 9.0 mg/dL      Total Protein 6.0 g/dL      Albumin 3.4 g/dL      ALT (SGPT) 13 U/L      Comment: Specimen hemolyzed.  Results may be affected.        AST (SGOT) 24 U/L      Comment: Specimen hemolyzed.  Results may be affected.        Alkaline Phosphatase 137 U/L      Total Bilirubin 0.2  mg/dL      Globulin 2.6 gm/dL      A/G Ratio 1.3 g/dL      BUN/Creatinine Ratio 60.6     Anion Gap 12.0 mmol/L      eGFR 60.7 mL/min/1.73     Narrative:      GFR Normal >60  Chronic Kidney Disease <60  Kidney Failure <15    The GFR formula is only valid for adults with stable renal function between ages 18 and 70.    POC Glucose Once [[id number]]  (Abnormal) Collected: 11/01/23 2359    Specimen: Blood Updated: 11/02/23 0001     Glucose 142 mg/dL      Comment: Serial Number: [id number]:  [id number]               PENDING RESULTS:     IMAGING REVIEWED:  XR Chest 1 View    Result Date: 11/2/2023  Impression: 1. Esophagogastric tube tip below the diaphragm. 2. Persistent left basilar airspace disease which may relate to atelectasis or pneumonia with small left pleural effusion. Electronically Signed: Pavan Amezquita MD  11/2/2023 7:06 AM EDT  Workstation ID: [id number]    XR Abdomen 1 View    Result Date: 11/2/2023  Impression: Gastric suction tube terminates in the gastric lumen. Electronically Signed: Miguel Trent MD  11/2/2023 12:09 AM EDT  Workstation ID: [id number]    XR Chest 1 View    Result Date: 11/1/2023  Impression: 1. Esophagogastric tube tip below the diaphragm. 2. Persistent left basilar airspace with suspected small left pleural effusion. Electronically Signed: Pavan Amezquita MD  11/1/2023 7:10 AM EDT  Workstation ID: [id number]     Assessment & Plan   ASSESSMENT:  Chronic lymphocytic leukemia: First diagnosed at the end of 2022 and she has not needed treatment since the diagnosis.  Her condition is complicated at this time but the severity of her present illness.  The sudden marked increase in the white cell count is most likely not representative of the malignant disease but rather connected to the present illness. 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   Normocytic anemia: Has experienced  coffee-ground emesis and gastroenterology is following but patient is a poor candidate for sedation/EGD.  They plan for EGD only for life-threatening GI bleed at this time.Haptoglobin was elevated indicating low likelihood of hemolytic anemia.  Continue to monitor her hemoglobin so far remained stable.  On PPI/Carafate  Acute respiratory failure with hypoxia and hypercapnia/ARDS hospital-acquired pneumonia fluid overload and pulmonary edema.  Septic shock due to UTI/E. coli and Klebsiella bacteremia ID is following. She has completed her course of antibiotics  Coffee-ground emesis: Not a candidate for EGD on twice daily PPI, Carafate tube feeds per primary. Monitor cbc closely  History of stage II right breast cancer status post right mastectomy.  ER positive, MS positive, HER2/juan luis negative.  Continue Arimidex  Nutrition via tube feeds    PLAN:  Monitor CBC and transfuse for hemoglobin less than 7.0 g/dL  Daily CBCs  Serum IgG is 463  Leucocytosis is stable  Antibiotics per infectious disease.  IV Merrem completed 10/31/2023  Will continue to follow.            Electronically signed by Ling Bolden MD, 11/03/23, 8:31 AM EDT.

## 2023-11-02 NOTE — NURSING NOTE
No reports of pain this shift. VSS. Pt pulled out NG tube with daughter at bedside, NG replaced with KUB to verify. No iv access, suggesting IV team consult.

## 2023-11-02 NOTE — PLAN OF CARE
Goal Outcome Evaluation:      Diane Guan presents with functional mobility impairments which indicate the need for skilled intervention. Pt required mod A to perform stand pivot from bed to bedside chair. Pt expressed feeling dizzy with BP reading WFL. Pt limited due to decreased activity tolerance and functional strength. Tolerating session today without incident. Will continue to follow and progress as tolerated.

## 2023-11-02 NOTE — PLAN OF CARE
Goal Outcome Evaluation:                   Video fluoroscopic swallow study conducted in the lateral position with pt seated upright. Pt was administered trials and self fed trials as provided by SLP respectively: thin liquid by spoon x 2, ntl by spoon x2, ntl by cup x1, ntl by straw x1, puree (applesauce) x 2, mechanical soft/mixed consistency (peaches) x 1 & ntl by straw x1.       Across all trials pt presents w/decreased anterior hyoid movement and epiglottic deflection. Pt noted w/deep penetration of thin liquid and mild, transient penetration on 1/5 trials of ntl. No penetration or aspiration w/puree. Mark aspiration of peaches from the pyriforms during the swallow. No remarkable cricopharyngeal or UES findings. See report for further details.    Recommend a puree and ntl diet, meds crushed in puree and the [place] Water Protocol (see below for guidelines).   Pt should be sitting up to 90 degree hip flexion for all PO.  Small bites and sips at slow rate.  ST to continue to follow for dysphagia tx.    Water Protocol:  The rationale to recommend water when a PO diet cannot appropriately/functionally sustain nutrition (or if thickened liquids are prescribed due to aspiration of thin liquids) is because water is low risk for aspiration pna when compared to aspiration of food or other liquids.    Benefits of a water protocol include but are not limited to:     Oral gratification   Engagement of oropharyngeal swallow musculature   Decrease likelihood of dehydration      Guidelines for proper implementation include:  Waiting a minimum of 30 minutes after consuming any other PO   Thorough oral care prior to consuming water  Upright at 90 degree hip flexion  Small sips at slow rate  Monitor for any changes in respiratory status and discontinue if distress noted    The [place] Free Water Protocol is a research based protocol established in 1984.

## 2023-11-03 ENCOUNTER — APPOINTMENT (OUTPATIENT)
Dept: GENERAL RADIOLOGY | Facility: HOSPITAL | Age: 83
DRG: 207 | End: 2023-11-03
Payer: MEDICARE

## 2023-11-03 LAB
ALBUMIN SERPL-MCNC: 3.4 G/DL (ref 3.5–5.2)
ALBUMIN/GLOB SERPL: 1.1 G/DL
ALP SERPL-CCNC: 143 U/L (ref 39–117)
ALT SERPL W P-5'-P-CCNC: 15 U/L (ref 1–33)
ANION GAP SERPL CALCULATED.3IONS-SCNC: 10 MMOL/L (ref 5–15)
ANISOCYTOSIS BLD QL: ABNORMAL
AST SERPL-CCNC: 18 U/L (ref 1–32)
BILIRUB SERPL-MCNC: 0.2 MG/DL (ref 0–1.2)
BUN SERPL-MCNC: 52 MG/DL (ref 8–23)
BUN/CREAT SERPL: 55.9 (ref 7–25)
CALCIUM SPEC-SCNC: 9.2 MG/DL (ref 8.6–10.5)
CHLORIDE SERPL-SCNC: 97 MMOL/L (ref 98–107)
CO2 SERPL-SCNC: 29 MMOL/L (ref 22–29)
CREAT SERPL-MCNC: 0.93 MG/DL (ref 0.57–1)
DEPRECATED RDW RBC AUTO: 48.6 FL (ref 37–54)
EGFRCR SERPLBLD CKD-EPI 2021: 61.5 ML/MIN/1.73
EOSINOPHIL # BLD MANUAL: 0.44 10*3/MM3 (ref 0–0.4)
EOSINOPHIL NFR BLD MANUAL: 1 % (ref 0.3–6.2)
ERYTHROCYTE [DISTWIDTH] IN BLOOD BY AUTOMATED COUNT: 15.3 % (ref 12.3–15.4)
GLOBULIN UR ELPH-MCNC: 3 GM/DL
GLUCOSE BLDC GLUCOMTR-MCNC: 106 MG/DL (ref 70–105)
GLUCOSE BLDC GLUCOMTR-MCNC: 107 MG/DL (ref 70–105)
GLUCOSE BLDC GLUCOMTR-MCNC: 116 MG/DL (ref 70–105)
GLUCOSE BLDC GLUCOMTR-MCNC: 126 MG/DL (ref 70–105)
GLUCOSE BLDC GLUCOMTR-MCNC: 147 MG/DL (ref 70–105)
GLUCOSE SERPL-MCNC: 120 MG/DL (ref 65–99)
HCT VFR BLD AUTO: 27 % (ref 34–46.6)
HGB BLD-MCNC: 8.5 G/DL (ref 12–15.9)
LARGE PLATELETS: ABNORMAL
LYMPHOCYTES # BLD MANUAL: 36 10*3/MM3 (ref 0.7–3.1)
LYMPHOCYTES NFR BLD MANUAL: 2 % (ref 5–12)
MAGNESIUM SERPL-MCNC: 3.2 MG/DL (ref 1.6–2.4)
MCH RBC QN AUTO: 27.2 PG (ref 26.6–33)
MCHC RBC AUTO-ENTMCNC: 31.3 G/DL (ref 31.5–35.7)
MCV RBC AUTO: 87 FL (ref 79–97)
MONOCYTES # BLD: 0.88 10*3/MM3 (ref 0.1–0.9)
NEUTROPHILS # BLD AUTO: 6.59 10*3/MM3 (ref 1.7–7)
NEUTROPHILS NFR BLD MANUAL: 15 % (ref 42.7–76)
PLATELET # BLD AUTO: 490 10*3/MM3 (ref 140–450)
PMV BLD AUTO: 9 FL (ref 6–12)
POTASSIUM SERPL-SCNC: 4.3 MMOL/L (ref 3.5–5.2)
PROT SERPL-MCNC: 6.4 G/DL (ref 6–8.5)
RBC # BLD AUTO: 3.1 10*6/MM3 (ref 3.77–5.28)
SCAN SLIDE: NORMAL
SMALL PLATELETS BLD QL SMEAR: ABNORMAL
SODIUM SERPL-SCNC: 136 MMOL/L (ref 136–145)
VARIANT LYMPHS NFR BLD MANUAL: 82 % (ref 19.6–45.3)
WBC MORPH BLD: NORMAL
WBC NRBC COR # BLD: 43.9 10*3/MM3 (ref 3.4–10.8)

## 2023-11-03 PROCEDURE — 97110 THERAPEUTIC EXERCISES: CPT

## 2023-11-03 PROCEDURE — 25010000002 ENOXAPARIN PER 10 MG: Performed by: INTERNAL MEDICINE

## 2023-11-03 PROCEDURE — 92526 ORAL FUNCTION THERAPY: CPT

## 2023-11-03 PROCEDURE — 97530 THERAPEUTIC ACTIVITIES: CPT

## 2023-11-03 PROCEDURE — 82948 REAGENT STRIP/BLOOD GLUCOSE: CPT

## 2023-11-03 PROCEDURE — 71045 X-RAY EXAM CHEST 1 VIEW: CPT

## 2023-11-03 PROCEDURE — 97535 SELF CARE MNGMENT TRAINING: CPT

## 2023-11-03 PROCEDURE — 25010000002 CALCIUM GLUCONATE 2-0.675 GM/100ML-% SOLUTION: Performed by: INTERNAL MEDICINE

## 2023-11-03 PROCEDURE — 99232 SBSQ HOSP IP/OBS MODERATE 35: CPT | Performed by: INTERNAL MEDICINE

## 2023-11-03 RX ORDER — CALCIUM GLUCONATE 20 MG/ML
2000 INJECTION, SOLUTION INTRAVENOUS ONCE
Status: COMPLETED | OUTPATIENT
Start: 2023-11-03 | End: 2023-11-03

## 2023-11-03 RX ADMIN — Medication 10 ML: at 10:44

## 2023-11-03 RX ADMIN — LANSOPRAZOLE 30 MG: 30 TABLET, ORALLY DISINTEGRATING ORAL at 10:28

## 2023-11-03 RX ADMIN — ROSUVASTATIN 10 MG: 10 TABLET, FILM COATED ORAL at 20:56

## 2023-11-03 RX ADMIN — CHLORTHALIDONE 25 MG: 25 TABLET ORAL at 10:26

## 2023-11-03 RX ADMIN — ACETAMINOPHEN 650 MG: 325 TABLET, FILM COATED ORAL at 11:17

## 2023-11-03 RX ADMIN — ENOXAPARIN SODIUM 40 MG: 40 INJECTION, SOLUTION SUBCUTANEOUS at 15:41

## 2023-11-03 RX ADMIN — METOCLOPRAMIDE 5 MG: 5 TABLET ORAL at 11:15

## 2023-11-03 RX ADMIN — OXYCODONE HYDROCHLORIDE 5 MG: 5 TABLET ORAL at 20:56

## 2023-11-03 RX ADMIN — OXYCODONE HYDROCHLORIDE 5 MG: 5 TABLET ORAL at 15:41

## 2023-11-03 RX ADMIN — METOCLOPRAMIDE 5 MG: 5 TABLET ORAL at 02:40

## 2023-11-03 RX ADMIN — CYCLOBENZAPRINE 10 MG: 10 TABLET, FILM COATED ORAL at 20:56

## 2023-11-03 RX ADMIN — LOSARTAN POTASSIUM 25 MG: 25 TABLET, FILM COATED ORAL at 10:23

## 2023-11-03 RX ADMIN — SUCRALFATE 1 G: 1 TABLET ORAL at 17:58

## 2023-11-03 RX ADMIN — Medication 10 ML: at 09:00

## 2023-11-03 RX ADMIN — CALCIUM GLUCONATE 2000 MG: 20 INJECTION, SOLUTION INTRAVENOUS at 15:41

## 2023-11-03 RX ADMIN — ROPINIROLE HYDROCHLORIDE 0.25 MG: 1 TABLET, FILM COATED ORAL at 20:56

## 2023-11-03 RX ADMIN — METOCLOPRAMIDE 5 MG: 5 TABLET ORAL at 17:58

## 2023-11-03 RX ADMIN — SERTRALINE 150 MG: 100 TABLET, FILM COATED ORAL at 10:30

## 2023-11-03 RX ADMIN — LANSOPRAZOLE 30 MG: 30 TABLET, ORALLY DISINTEGRATING ORAL at 20:56

## 2023-11-03 RX ADMIN — AMLODIPINE BESYLATE 10 MG: 5 TABLET ORAL at 10:31

## 2023-11-03 RX ADMIN — SUCRALFATE 1 G: 1 TABLET ORAL at 10:27

## 2023-11-03 RX ADMIN — LEVOTHYROXINE SODIUM 100 MCG: 0.1 TABLET ORAL at 06:00

## 2023-11-03 RX ADMIN — SUCRALFATE 1 G: 1 TABLET ORAL at 20:56

## 2023-11-03 RX ADMIN — ACETAMINOPHEN 650 MG: 325 TABLET, FILM COATED ORAL at 20:56

## 2023-11-03 RX ADMIN — Medication 300 MG: at 10:33

## 2023-11-03 NOTE — PROGRESS NOTES
Nutrition Services  Patient Name: Diane Guan  YOB: 1940  MRN: [id number]  Admission date: 10/10/2023    PPE Documentation        PPE Worn By Provider Did not enter room for this encounter   PPE Worn By Patient  N/A     PROGRESS NOTE      Encounter Information: Progress note to check on TF.  S/P VFSS yesterday with diet advancement to pureed with NTL.  No longer has NG tube in place.  Poor PO intakes since diet advancement.         PO Diet: Diet: Regular/House Diet; Texture: Pureed (NDD 1); Fluid Consistency: Nectar Thick   PO Supplements: None ordered    PO Intake:  Bites        Current nutrition support: Nutren 1.5 at 55 mL/hour + 45 mL/hour water flush    Nutrition support review: Not running related to no access       Labs (reviewed below): Reviewed, management per attending        GI Function:  Last documented BM 11/3 (today)       Nutrition Intervention Updates: Add Magic Cups at lunch/dinner (Provides 580 kcals, 18 g protein if consumed)     D/C tube feeding order        Results from last 7 days   Lab Units 11/02/23 2328 11/02/23  0005 11/01/23  0937   SODIUM mmol/L 136 136 136   POTASSIUM mmol/L 4.3 4.9 4.4   CHLORIDE mmol/L 97* 95* 95*   CO2 mmol/L 29.0 29.0 31.0*   BUN mg/dL 52* 57* 65*   CREATININE mg/dL 0.93 0.94 0.99   CALCIUM mg/dL 9.2 9.0 9.1   BILIRUBIN mg/dL 0.2 0.2 0.2   ALK PHOS U/L 143* 137* 134*   ALT (SGPT) U/L 15 13 12   AST (SGOT) U/L 18 24 18   GLUCOSE mg/dL 120* 138* 98     Results from last 7 days   Lab Units 11/02/23 2328 11/02/23  0005 11/01/23  1425 11/01/23  0937   MAGNESIUM mg/dL 3.2* 2.5*  --  2.5*   HEMOGLOBIN g/dL 8.5* 8.4*   < >  --    HEMATOCRIT % 27.0* 27.3*   < >  --     < > = values in this interval not displayed.     COVID19   Date Value Ref Range Status   10/29/2023 Not Detected Not Detected - Ref. Range Final     Lab Results   Component Value Date    HGBA1C 6.50 (H) 10/13/2023     RD to follow up per protocol.    Electronically signed by:  Vernell  COLIN Gil  11/03/23 08:00 EDT

## 2023-11-03 NOTE — PROGRESS NOTES
: The patient feels better    Vital Signs  Vitals:    11/03/23 1031   BP: 142/53   Pulse: 76   Resp:    Temp:    SpO2:      No intake/output data recorded.  I/O last 3 completed shifts:  In: 1311 [P.O.:120; Other:571; NG/GT:620]  Out: 1300 [Urine:1300]      Physical Exam:    General: In no acute distress  HEENT:  Normocephalic, Atraumatic, EOMI, PERRLA, NO icterus,  Neck:  Supple, No JVD, No Carotid artery bruit, No lymphadenopathy  Chest: Clear to auscultation bilaterally,   Cardiovascular: Regular rate and rhythm. Positive S1 & S2, No rub, murmur or gallop.  Abdominal: Soft, nontender, no palpable organomegaly, no abdominal bruit, positive bowel sounds  Musculoskeletal: No joint tenderness or swelling, good range of motion, Adequate muscle strength on both sides, no cyanosis, clubbing or edema on lower extremities.  Neuro: Alert oriented x3, CN1 - 12 intact, No focal sensory or motor deficit.      Review of Systems:   CNS: Denied headaches, blurred vision, tingling or numbness in any part of body. No weakness or any impaired speech.  CVS: No chest pain or shortness of breath, No orthopnea or PND or exertional dyspnea,  Pulmonary: Denies shortness of breath, coughs, hematemesis, night sweats or weight loss.  GI: Denies diarrhea, nausea, vomiting. Denies weight loss, hematemesis, melena.  : Denies dysuria, frequency or hesitancy of urination  Vascular: Denies claudication, resting pain, tingling numbness or weakness in any part of the body.  Musculoskeletal: Denies Joint tenderness or pain, denies stiffness in joints, denies fever or weight loss, or skin rashes.    Current Labs  [unfilled]  Lab Results (last 24 hours)       Procedure Component Value Units Date/Time    POC Glucose Once [[id number]]  (Abnormal) Collected: 11/03/23 1201    Specimen: Blood Updated: 11/03/23 1203     Glucose 147 mg/dL      Comment: Serial Number: [id number]:  [id number]       POC Glucose Once [[id number]]  (Abnormal)  Collected: 11/03/23 0538    Specimen: Blood Updated: 11/03/23 0539     Glucose 116 mg/dL      Comment: Serial Number: [id number]:  [id number]       CBC & Differential [[id number]]  (Abnormal) Collected: 11/02/23 2328    Specimen: Blood Updated: 11/03/23 0155    Narrative:      The following orders were created for panel order CBC & Differential.  Procedure                               Abnormality         Status                     ---------                               -----------         ------                     CBC Auto Differential[[id number]]        Abnormal            Final result               Scan Slide[[id number]]                                       Final result                 Please view results for these tests on the individual orders.    CBC Auto Differential [[id number]]  (Abnormal) Collected: 11/02/23 2328    Specimen: Blood Updated: 11/03/23 0155     WBC 43.90 10*3/mm3      RBC 3.10 10*6/mm3      Hemoglobin 8.5 g/dL      Hematocrit 27.0 %      MCV 87.0 fL      MCH 27.2 pg      MCHC 31.3 g/dL      RDW 15.3 %      RDW-SD 48.6 fl      MPV 9.0 fL      Platelets 490 10*3/mm3     Narrative:      Reviewed by Pathologist within the past 30 days on [id number].    The previously reported component NRBC is no longer being reported. Previous result was 0.1 /100 WBC (Reference Range: 0.0-0.2 /100 WBC) on 11/3/2023 at 0012 EDT.    Scan Slide [[id number]] Collected: 11/02/23 2328    Specimen: Blood Updated: 11/03/23 0155     Scan Slide --     Comment: See Manual Differential Results       Manual Differential [[id number]]  (Abnormal) Collected: 11/02/23 2328    Specimen: Blood Updated: 11/03/23 0155     Neutrophil % 15.0 %      Lymphocyte % 82.0 %      Monocyte % 2.0 %      Eosinophil % 1.0 %      Neutrophils Absolute 6.59 10*3/mm3      Lymphocytes Absolute 36.00 10*3/mm3      Monocytes Absolute 0.88 10*3/mm3      Eosinophils Absolute 0.44 10*3/mm3      Anisocytosis Slight/1+     WBC Morphology Normal     Platelet  Estimate Increased     Large Platelets Slight/1+    Magnesium [[id number]]  (Abnormal) Collected: 11/02/23 2328    Specimen: Blood Updated: 11/03/23 0029     Magnesium 3.2 mg/dL     Comprehensive Metabolic Panel [[id number]]  (Abnormal) Collected: 11/02/23 2328    Specimen: Blood Updated: 11/03/23 0023     Glucose 120 mg/dL      BUN 52 mg/dL      Creatinine 0.93 mg/dL      Sodium 136 mmol/L      Potassium 4.3 mmol/L      Chloride 97 mmol/L      CO2 29.0 mmol/L      Calcium 9.2 mg/dL      Total Protein 6.4 g/dL      Albumin 3.4 g/dL      ALT (SGPT) 15 U/L      AST (SGOT) 18 U/L      Alkaline Phosphatase 143 U/L      Total Bilirubin 0.2 mg/dL      Globulin 3.0 gm/dL      A/G Ratio 1.1 g/dL      BUN/Creatinine Ratio 55.9     Anion Gap 10.0 mmol/L      eGFR 61.5 mL/min/1.73     Narrative:      GFR Normal >60  Chronic Kidney Disease <60  Kidney Failure <15    The GFR formula is only valid for adults with stable renal function between ages 18 and 70.    POC Glucose Once [[id number]]  (Abnormal) Collected: 11/03/23 0016    Specimen: Blood Updated: 11/03/23 0017     Glucose 126 mg/dL      Comment: Serial Number: [id number]:  [id number]       POC Glucose Once [[id number]]  (Abnormal) Collected: 11/02/23 1749    Specimen: Blood Updated: 11/02/23 1751     Glucose 142 mg/dL      Comment: Serial Number: [id number]:  [id number]       Immunoglobulins A/E/G/M Serum [[id number]]  (Abnormal) Collected: 10/29/23 1333    Specimen: Blood Updated: 11/02/23 1309     IgG 463 mg/dL      IgA 156 mg/dL      IgM 21 mg/dL      Comment: Result confirmed on concentration.        IgE 2 IU/mL     Narrative:      Performed at:  01 - [facility]  [id number]  : Chucky Regan PhD, Phone:  [phone]  Performed at:  02 - [facility]  [id number]  : Rudy Maldonado MD, Phone:  [phone]          Current Radiology  Imaging Results (Last 24 Hours)        Procedure Component Value Units Date/Time    XR Chest 1 View [[id number]] Collected: 11/03/23 0704     Updated: 11/03/23 0708    Narrative:      XR CHEST 1 VW    Date of Exam: 11/3/2023 3:35 AM EDT    Indication: Hypoxia    Comparison: 11/2/2023    Findings:  The trachea is midline. Interval removal of OG tube. There is mild enlarged appearance of the cardiopulmonary silhouette. There are increased bibasilar opacities likely atelectasis or pneumonia. No significant pleural effusion      Impression:      Impression:  Interval removal of OG tube    Increased bibasilar opacities likely atelectasis or pneumonia      Electronically Signed: Dwight Arredondo MD    11/3/2023 7:06 AM EDT    Workstation ID: [id number]          Current Meds    Current Facility-Administered Medications:     acetaminophen (TYLENOL) tablet 650 mg, 650 mg, Nasogastric, Q4H PRN, Vinicius Heredia DO, 650 mg at 11/03/23 1117    amLODIPine (NORVASC) tablet 10 mg, 10 mg, Nasogastric, Q24H, Ra Myers MD, 10 mg at 11/03/23 1031    Barium Sulfate 60 % cream 1 teaspoon(s), 1 teaspoon(s), Oral, Once in imaging, Ra Myers MD    chlorthalidone (HYGROTON) tablet 25 mg, 25 mg, Nasogastric, Daily, Day, Anita G, APRN, 25 mg at 11/03/23 1026    cyclobenzaprine (FLEXERIL) tablet 10 mg, 10 mg, Nasogastric, TID PRN, Vinicius Heredia DO, 10 mg at 11/01/23 2123    dextrose (D50W) (25 g/50 mL) IV injection 25 g, 25 g, Intravenous, Q15 Min PRN, Kalyan Mckeon MD, 25 g at 10/26/23 0614    dextrose (D50W) (25 g/50 mL) IV injection 50 mL, 50 mL, Intravenous, Q1H PRN, Ra Myers MD, 50 mL at 10/14/23 1235    dextrose (GLUTOSE) oral gel 15 g, 15 g, Nasogastric, Q15 Min PRN, Vinicius Heredia DO    Enoxaparin Sodium (LOVENOX) syringe 40 mg, 40 mg, Subcutaneous, Daily, Foreign Bolton MD, 40 mg at 11/02/23 1717    Ferrous Sulfate 300 (60 Fe) MG/5ML solution 300 mg, 300 mg, Nasogastric, Once per day on Mon Wed Fri, Lucia,  Ra DINERO MD, 300 mg at 11/03/23 1033    glucagon (GLUCAGEN) injection 1 mg, 1 mg, Intramuscular, Q15 Min PRN, Kalyan Mckeon MD    hydrALAZINE (APRESOLINE) injection 10 mg, 10 mg, Intravenous, Q6H PRN, Ramona Wilder, APRN, 10 mg at 10/29/23 0624    insulin lispro (HUMALOG/ADMELOG) injection 4-24 Units, 4-24 Units, Subcutaneous, Q6H, Vinicius Heredia DO, 4 Units at 11/02/23 0604    ipratropium-albuterol (DUO-NEB) nebulizer solution 3 mL, 3 mL, Nebulization, Q6H PRN, Day, Anita G, APRN, 3 mL at 10/30/23 2330    lansoprazole (PREVACID SOLUTAB) disintegrating tablet Tablet Delayed Release Dispersible 30 mg, 30 mg, Nasogastric, BID, Fritz Archibald MD, 30 mg at 11/03/23 1028    levothyroxine (SYNTHROID, LEVOTHROID) tablet 100 mcg, 100 mcg, Nasogastric, Q AM, Day, Anita G, APRN, 100 mcg at 11/03/23 0600    lidocaine (XYLOCAINE) 5 % ointment, , , PRN, Vinicius Heredia DO, 1 application  at 10/18/23 1449    LORazepam (ATIVAN) injection 0.5 mg, 0.5 mg, Intravenous, Q6H PRN, aR Myers MD, 0.5 mg at 11/02/23 0500    losartan (COZAAR) tablet 25 mg, 25 mg, Oral, Q24H, Ra Myers MD, 25 mg at 11/03/23 1023    metoclopramide (REGLAN) tablet 5 mg, 5 mg, Nasogastric, Q8H, Ra Myers MD, 5 mg at 11/03/23 1115    nitroglycerin (NITROSTAT) SL tablet 0.4 mg, 0.4 mg, Sublingual, Q5 Min PRN, Kalyan Mckeon MD    ondansetron (ZOFRAN) injection 4 mg, 4 mg, Intravenous, Q6H PRN, Kalyan Mckeon MD, 4 mg at 10/31/23 0734    oxyCODONE (ROXICODONE) immediate release tablet 5 mg, 5 mg, Nasogastric, Q6H PRN, Vinicius Heredia DO, 5 mg at 10/30/23 1759    polyethylene glycol (MIRALAX) packet 17 g, 17 g, Oral, Daily PRN, Foreign Bolton MD, 17 g at 10/30/23 2313    rOPINIRole (REQUIP) tablet 0.25 mg, 0.25 mg, Nasogastric, Nightly, Foreign Bolton MD, 0.25 mg at 11/02/23 2032    rosuvastatin (CRESTOR) tablet 10 mg, 10 mg, Nasogastric, Nightly, Day, Anita G, APRN, 10 mg at 11/02/23 2032     sennosides-docusate (PERICOLACE) 8.6-50 MG per tablet 2 tablet, 2 tablet, Nasogastric, BID, Foreign Bolton MD, 2 tablet at 11/02/23 2032    sertraline (ZOLOFT) tablet 150 mg, 150 mg, Nasogastric, Daily, Foreign Bolton MD, 150 mg at 11/03/23 1030    [COMPLETED] Insert Peripheral IV, , , Once **AND** sodium chloride 0.9 % flush 10 mL, 10 mL, Intravenous, PRN, Sheree Field PA-C    sodium chloride 0.9 % flush 10 mL, 10 mL, Intravenous, Q12H, Kalyan Mckeon MD, 10 mL at 11/03/23 0900    sodium chloride 0.9 % flush 10 mL, 10 mL, Intravenous, PRN, Kalyan Mckeon MD    sodium chloride 0.9 % flush 10 mL, 10 mL, Intravenous, Q12H, Day, Anita G, APRN, 10 mL at 11/03/23 1044    sodium chloride 0.9 % flush 10 mL, 10 mL, Intravenous, PRN, Day, Anita G, APRN    sodium chloride 0.9 % infusion 40 mL, 40 mL, Intravenous, PRN, Kalyan Mckeon MD    sodium chloride 0.9 % infusion 40 mL, 40 mL, Intravenous, PRN, Day, Anita G, APRN    sucralfate (CARAFATE) tablet 1 g, 1 g, Nasogastric, 4x Daily AC & at Bedtime, Ra Myers MD, 1 g at 11/03/23 1027      Assessment and plan:          Acute hypoxemic respiratory failure    Mixed anxiety and depressive disorder    Primary osteoarthritis involving multiple joints    Hiatal hernia with gastroesophageal reflux    Mixed hyperlipidemia    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    Acquired hypothyroidism    Type 2 diabetes mellitus with stage 3b chronic kidney disease, without long-term current use of insulin    Insomnia    Overactive bladder    Vitamin B 12 deficiency    Vitamin D deficiency    LIBBY (obstructive sleep apnea)    COPD (chronic obstructive pulmonary disease)    CKD (chronic kidney disease) stage 3, GFR 30-59 ml/min    History of breast cancer    Personal history of CLL (chronic lymphocytic leukemia)    History of cigarette smoking    Fall at home    Rhinovirus infection    Acute respiratory failure    Hyperkalemia    Respiratory failure  Acute kidney  injury with prerenal azotemia most likely from steroids creatinine is close to baseline  Anemia iron studies pending  Pneumonia and sepsis as per admitting  Avoid nephrotoxic agents monitor renal function electrolyte and acid-base        Unique Luna MD FACP, FASN.  11/03/23  13:05 EDT    The patient feeling better

## 2023-11-03 NOTE — THERAPY TREATMENT NOTE
Acute Care - Speech Language Pathology   Swallow Treatment Note  [place]     Patient Name: Diane Guan  : 1940  MRN: [id number]  Today's Date: 11/3/2023               Admit Date: 10/10/2023    Visit Dx:     ICD-10-CM ICD-9-CM   1. Hypoxemia  R09.02 799.02   2. Chronic respiratory failure with hypoxia  J96.11 518.83     799.02   3. Rhinovirus infection  B34.8 079.3   4. Dyspnea, unspecified type  R06.00 786.09   5. Cough, unspecified type  R05.9 786.2   6. Rib pain on right side  R07.81 786.50   7. Right low back pain, unspecified chronicity, unspecified whether sciatica present  M54.50 724.2   8. Acute respiratory failure with hypoxia  J96.01 518.81   9. Pneumonia of left lower lobe due to infectious organism  J18.9 486   10. Mucus plugging of bronchi  T17.500A 519.19   11. Acute hypoxemic respiratory failure  J96.01 518.81   12. 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    Acquired hypothyroidism    Type 2 diabetes mellitus with stage 3b chronic kidney disease, without long-term current use of insulin    Insomnia    Postmenopausal status    Shoulder pain    Overactive bladder    Vitamin B 12 deficiency    Seasonal allergies    Absolute anemia    Vitamin D deficiency    LIBBY (obstructive sleep apnea)    Nocturnal hypoxia    Acute respiratory failure with hypoxia and hypercapnia    Abdominal pain    COPD (chronic obstructive pulmonary disease)    CKD (chronic kidney disease) stage 3, GFR 30-59 ml/min    Sepsis due to pneumonia    Acute on chronic respiratory failure with hypoxemia     Class 1 obesity due to excess calories with serious comorbidity and body mass index (BMI) of 32.0 to 32.9 in adult    Acute hypoxemic respiratory failure    History of breast cancer    Personal history of CLL (chronic lymphocytic leukemia)    History of cigarette smoking    Fall at home    Rhinovirus infection    Acute respiratory failure    Hyperkalemia    Respiratory failure     Past Medical History:   Diagnosis Date    Acute bronchitis due to human metapneumovirus 02/08/2020    Anxiety disorder     Arthritis     Breast cancer 02/2019    Invasive ductal carcinoma    Chronic lymphocytic leukemia (CLL), B-cell 01/2019    Depression     Disease of thyroid gland     Diverticulosis of colon 2018    Identified on colonoscopy    Dysphagia 12/2020    Dyspnea on exertion     Fall at home 10/11/2023    Hemorrhoid     Hiatal hernia 12/2020    Hiatal hernia with GERD     large hiatal hernia with high-grade reflux on barium swallow; s/p laparoscopic fundoplication with gastropexy    History of breast cancer 10/11/2023    History of cigarette smoking 10/11/2023    History of diabetes mellitus     no meds now    Hyperlipidemia     Hypertension     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    aspiration pneumonia; completed abx therapy    OAB (overactive bladder)     Personal history of CLL (chronic lymphocytic leukemia) 10/11/2023    Skin cancer     Sleep apnea     daughter states pt does not have and doesn't have machine     Past Surgical History:   Procedure Laterality Date    BLADDER SURGERY      BREAST BIOPSY      BRONCHOSCOPY N/A 09/13/2019    Procedure: BRONCHOSCOPY with bronchial washing;  Surgeon: Marnie Frankel MD;  Location: [facility] ENDOSCOPY;  Service: Pulmonary    BRONCHOSCOPY Bilateral 10/18/2023    Procedure: BRONCHOSCOPY AT BEDSIDE;  Surgeon: Vinicius Heredia DO;  Location: [facility] ENDOSCOPY;  Service: Pulmonary;  Laterality: Bilateral;    COLONOSCOPY  07/19/2018    severe diverticulosis    CYST REMOVAL      Removed a  fatty cyst off of her back    ENDOSCOPY N/A 11/23/2020    Procedure: ESOPHAGOGASTRODUODENOSCOPY with dilatation (Bougie # 48, 50, 52, 54, 56, 58);  Surgeon: Den Plummer DO;  Location: [facility] ENDOSCOPY;  Service: General;  Laterality: N/A;  Post: large hiatal hernia, joshua's ulcers    HIATAL HERNIA REPAIR N/A 12/30/2020    Procedure: Laparoscopic hiatal hernia repair with gastropexy;  Surgeon: Den Plummer DO;  Location: [facility] MAIN OR;  Service: General;  Laterality: N/A;    MASTECTOMY Right 02/04/2019    Invasive ductal carcinoma    TUBAL ABDOMINAL LIGATION       SWALLOW EVALUATION (last 72 hours)       SLP Adult Swallow Evaluation       Row Name 11/03/23 1077       Rehab Evaluation    Document Type therapy note (daily note)  -CP    Subjective Information no complaints  -CP    Patient Observations alert;cooperative  -CP    Patient Effort good  -CP    Comment Skilled ST targeting dysphagia was conducted today. pt was seen for meal assessment and for follow-up after VFSS. Pt was fed a portion of breakfast meal to pt. She was seen consuming puree Malay toast and NT OJ by spoon. Pt had slow but functional oral transit. No oral residual occurred. Visualization of swallow suggests timely initiation. After the swallow, pt had clear vocal quality and no cough or other overt s/s of aspiration. Pt appears to be tolerating recommended diet. education given to pt on the rationale for puree/NTL diet and results and recs from VFSS explained to pt. She verbalized understanding. ST will follow to assure tolerance of diet and make upgrades as pt improves.  -CP              User Key  (r) = Recorded By, (t) = Taken By, (c) = Cosigned By      Initials Name Effective Dates    CP Shanna Brownlee, HARJIT 06/16/21 -                     EDUCATION  The patient has been educated in the following areas:   Dysphagia (Swallowing Impairment) Oral Care/Hydration Modified Diet Instruction.        SLP GOALS       Row Name 11/03/23 9646  11/02/23 1300 11/01/23 0900       (LTG) Swallow    (LTG) Swallow pt will improve functional outcome measure score from FOIS LEVEL II (nothing by mouth, minimal attempts at PO) to FOIS LEVEL V or higher (total oral intake of multiple consistencies, requiring special preparation or compensations)  -CP pt will improve functional outcome measure score from FOIS LEVEL II (nothing by mouth, minimal attempts at PO) to FOIS LEVEL V or higher (total oral intake of multiple consistencies, requiring special preparation or compensations)  -EC pt will improve functional outcome measure score from FOIS LEVEL II (nothing by mouth, minimal attempts at PO) to FOIS LEVEL V or higher (total oral intake of multiple consistencies, requiring special preparation or compensations)  -MC    [place] (Swallow Long Term Goal) with 1:1 assist/ supervision  -CP with 1:1 assist/ supervision  -EC with 1:1 assist/ supervision  -MC    Time Frame (Swallow Long Term Goal) by discharge  -CP by discharge  -EC by discharge  -MC    Barriers (Swallow Long Term Goal) -- -- medical status inhibited particaption in dysphagia intervention or re-evaluation this date.  -MC    Progress/Outcomes (Swallow Long Term Goal) goal ongoing  -CP good progress toward goal  -EC goal ongoing  -MC    Comment (Swallow Long Term Goal) see above note  -CP -- see above note  -MC       (STG) Swallow 1    (STG) Swallow 1 The patient will participate in a meal/follow-up assessment to determine safety and adequacy of recommended diet, independent use of safe swallow compensations, pt/family education and additional goals/recommendations to follow  -CP pt will consume PO trials for diagnostic tx at bedside or under fluoroscopy for further assessment oropharyngeal swallow function status in 100% opps  -EC pt will consume PO trials for diagnostic tx at bedside or under fluoroscopy for further assessment oropharyngeal swallow function status in 100% opps  -MC    [place] (Swallow  Short Term Goal 1) -- independently (over 90% accuracy)  -EC independently (over 90% accuracy)  -MC    Time Frame (Swallow Short Term Goal 1) 1 week  -CP 1 week  -EC 1 week  -MC    Progress/Outcomes (Swallow Short Term Goal 1) goal ongoing  -CP goal met  -EC goal ongoing  -MC    Comment (Swallow Short Term Goal 1) See above note  -CP -- medical status inhibited particaption in dysphagia intervention or re-evaluation this date. See above  -       (STG) Pharyngeal Strengthening Exercise Goal 1 (SLP)    Activity (Pharyngeal Strengthening Goal 1, SLP) increase epiglottic inversion and retroflexion;increase closure at entrance to airway/closure of airway at glottis  -CP increase epiglottic inversion and retroflexion;increase closure at entrance to airway/closure of airway at glottis  -EC --    Increase Epiglottic Inversion and Retroflexion Mendelsohn;falsetto;hard effortful swallow  -CP Mendelsohn;falsetto;hard effortful swallow  -EC --    Increase Closure at Entrance to Airway/Closure of Airway at Glottis supraglottic swallow;falsetto;hard effortful swallow  -CP supraglottic swallow;falsetto;hard effortful swallow  -EC --    [place]/Accuracy (Pharyngeal Strengthening Goal 1, SLP) with maximum cues (25-49% accuracy)  -CP with maximum cues (25-49% accuracy)  -EC --    Time Frame (Pharyngeal Strengthening Goal 1, SLP) short term goal (STG)  -CP short term goal (STG)  -EC --    Progress/Outcomes (Pharyngeal Strengthening Goal 1, SLP) goal ongoing  -CP new goal  -EC --       (STG) Swallow Management Recall Goal 1 (SLP)    Activity (Swallow Management Recall Goal 1, SLP) independent recall of;aspiration precautions;oral care recommendations;safe diet/liquid level;safe diet level/texture  -CP independent recall of;aspiration precautions;oral care recommendations;safe diet/liquid level;safe diet level/texture  -EC --    [place]/Accuracy (Swallow Management Recall Goal 1, SLP) with maximum cues (25-49% accuracy)  -CP  with maximum cues (25-49% accuracy)  -EC --    Time Frame (Swallow Management Recall Goal 1, SLP) short term goal (STG)  -CP short term goal (STG)  -EC --    Progress/Outcomes (Swallow Management Recall Goal 1, SLP) goal ongoing  -CP new goal  -EC --              User Key  (r) = Recorded By, (t) = Taken By, (c) = Cosigned By      Initials Name Provider Type    CP Shanna Brownlee, SLP Speech and Language Pathologist    Cara Barclay Speech and Language Pathologist    Sapphire Sidhu, SLP Speech and Language Pathologist                       Time Calculation:                HARJIT Resendiz  11/3/2023

## 2023-11-03 NOTE — PLAN OF CARE
Goal Outcome Evaluation:      Pleasant and cooperative with care. Family at bedside. Pain medication given with + results. Continuing to monitor.

## 2023-11-03 NOTE — PROGRESS NOTES
Hematology/Oncology Inpatient Progress Note    PATIENT NAME: Diane Guan  : 1940  MRN: [id number]    CHIEF COMPLAINT: Falls, respiratory failure, leukocytosis with lymphocytosis    HISTORY OF PRESENT ILLNESS:      Diane Guan is a 83 y.o. female who presented to [facility] on 10/10/2023 with complaints of      MsZachery Guan was admitted to the hospital on 10/10/2023. She had fallen, sustaining some trauma to the chest. She complained of weakness of the right lower extremity. In addition she had a respiratory panel positive for rhinovirus. She had been treated in the past for early stage breast cancer and had remained free of evidence of recurrent disease. In addition she was known for chronic lymphocytic leukemia and had maintained significant lymphocytosis for some time but had never needed treatment. At the time of the admission she had maintained a similar white cell count. Today for the first time she had a very significant increase in the total white cell count and platelets. In addition she had significant thrombocytosis.   In spite of all efforts she had very slow improvement. She had continued to have difficulties with weakness and anorexia. She developed pneumonia and had to be intubated; she was finally extubated on 10/27.  On 10/23/2023 she was found to have positive blood cultures for E coli and Klebsiella. At the time of this consult she was still on antibiotics. She also had had evidence of gastrointestinal bleeding; she was diagnosed with iron deficiency and was started on iron but iron studies were not available.      He/She  has a past medical history of Acute bronchitis due to human metapneumovirus (2020), Anxiety disorder, Arthritis, Breast cancer (2019), Chronic lymphocytic leukemia (CLL), B-cell (2019), Depression, Disease of thyroid gland, Diverticulosis of colon (), Dysphagia (2020), Dyspnea on exertion, Fall at home (10/11/2023),  Hemorrhoid, Hiatal hernia (12/2020), Hiatal hernia with GERD, History of breast cancer (10/11/2023), History of cigarette smoking (10/11/2023), History of diabetes mellitus, Hyperlipidemia, Hypertension, Mycobacterium avium complex (02/2019), OAB (overactive bladder), Personal history of CLL (chronic lymphocytic leukemia) (10/11/2023), Skin cancer, and Sleep apnea.     PCP: Asia Queen APRN    Subjective   No Changes overnight      ROS:  Review of Systems   Constitutional:  Positive for activity change, appetite change and fatigue. Negative for chills and fever.   HENT:  Negative for congestion, drooling, ear discharge, rhinorrhea, sinus pressure and tinnitus.    Eyes:  Negative for photophobia, pain and discharge.   Respiratory:  Negative for apnea, choking and stridor.    Cardiovascular:  Negative for palpitations.   Gastrointestinal:  Negative for abdominal distention, abdominal pain and anal bleeding.   Endocrine: Negative for polydipsia and polyphagia.   Genitourinary:  Negative for decreased urine volume, flank pain and genital sores.   Musculoskeletal:  Negative for gait problem, neck pain and neck stiffness.   Skin:  Negative for color change, rash and wound.   Neurological:  Positive for weakness. Negative for tremors, seizures, syncope, facial asymmetry and speech difficulty.   Hematological:  Negative for adenopathy.   Psychiatric/Behavioral:  Negative for agitation, confusion, hallucinations and self-injury. The patient is not hyperactive.         MEDICATIONS:    Scheduled Meds:  amLODIPine, 10 mg, Nasogastric, Q24H  Barium Sulfate, 1 teaspoon(s), Oral, Once in imaging  chlorthalidone, 25 mg, Nasogastric, Daily  enoxaparin, 40 mg, Subcutaneous, Daily  Ferrous Sulfate, 300 mg, Nasogastric, Once per day on Mon Wed Fri  insulin lispro, 4-24 Units, Subcutaneous, Q6H  lansoprazole, 30 mg, Nasogastric, BID  levothyroxine, 100 mcg, Nasogastric, Q AM  losartan, 25 mg, Oral, Q24H  metoclopramide, 5 mg,  "Nasogastric, Q8H  rOPINIRole, 0.25 mg, Nasogastric, Nightly  rosuvastatin, 10 mg, Nasogastric, Nightly  senna-docusate sodium, 2 tablet, Nasogastric, BID  sertraline, 150 mg, Nasogastric, Daily  sodium chloride, 10 mL, Intravenous, Q12H  sodium chloride, 10 mL, Intravenous, Q12H  sucralfate, 1 g, Nasogastric, 4x Daily AC & at Bedtime       Continuous Infusions:      PRN Meds:    acetaminophen    cyclobenzaprine    dextrose    dextrose    dextrose    glucagon (human recombinant)    hydrALAZINE    ipratropium-albuterol    lidocaine    LORazepam    nitroglycerin    ondansetron    oxyCODONE    polyethylene glycol    [COMPLETED] Insert Peripheral IV **AND** sodium chloride    sodium chloride    sodium chloride    sodium chloride    sodium chloride     ALLERGIES:  No Known Allergies    Objective    VITALS:   /48 (BP Location: Left arm, Patient Position: Lying)   Pulse 83   Temp 98 °F (36.7 °C) (Oral)   Resp 18   Ht 160 cm (63\")   Wt 77 kg (169 lb 12.1 oz)   SpO2 95%   BMI 30.07 kg/m²     PHYSICAL EXAM: (performed by MD)  Physical Exam  Constitutional:       General: She is in acute distress.      Appearance: She is ill-appearing.   HENT:      Head:      Comments: Ng tube in palce     Mouth/Throat:      Mouth: Mucous membranes are moist.      Pharynx: Oropharynx is clear.   Eyes:      Extraocular Movements: Extraocular movements intact.      Pupils: Pupils are equal, round, and reactive to light.   Pulmonary:      Effort: Pulmonary effort is normal.   Neurological:      General: No focal deficit present.      Mental Status: Mental status is at baseline.       I have reexamined the patient and the results are consistent with the previously documented exam. Ling Bolden MD      RECENT LABS:    Lab Results (last 24 hours)       Procedure Component Value Units Date/Time    POC Glucose Once [[id number]]  (Abnormal) Collected: 11/03/23 0538    Specimen: Blood Updated: 11/03/23 0539     Glucose 116 mg/dL     "  Comment: Serial Number: [id number]:  [id number]       CBC & Differential [[id number]]  (Abnormal) Collected: 11/02/23 2328    Specimen: Blood Updated: 11/03/23 0155    Narrative:      The following orders were created for panel order CBC & Differential.  Procedure                               Abnormality         Status                     ---------                               -----------         ------                     CBC Auto Differential[[id number]]        Abnormal            Final result               Scan Slide[[id number]]                                       Final result                 Please view results for these tests on the individual orders.    CBC Auto Differential [[id number]]  (Abnormal) Collected: 11/02/23 2328    Specimen: Blood Updated: 11/03/23 0155     WBC 43.90 10*3/mm3      RBC 3.10 10*6/mm3      Hemoglobin 8.5 g/dL      Hematocrit 27.0 %      MCV 87.0 fL      MCH 27.2 pg      MCHC 31.3 g/dL      RDW 15.3 %      RDW-SD 48.6 fl      MPV 9.0 fL      Platelets 490 10*3/mm3     Narrative:      Reviewed by Pathologist within the past 30 days on [id number].    The previously reported component NRBC is no longer being reported. Previous result was 0.1 /100 WBC (Reference Range: 0.0-0.2 /100 WBC) on 11/3/2023 at 0012 EDT.    Scan Slide [[id number]] Collected: 11/02/23 2328    Specimen: Blood Updated: 11/03/23 0155     Scan Slide --     Comment: See Manual Differential Results       Manual Differential [[id number]]  (Abnormal) Collected: 11/02/23 2328    Specimen: Blood Updated: 11/03/23 0155     Neutrophil % 15.0 %      Lymphocyte % 82.0 %      Monocyte % 2.0 %      Eosinophil % 1.0 %      Neutrophils Absolute 6.59 10*3/mm3      Lymphocytes Absolute 36.00 10*3/mm3      Monocytes Absolute 0.88 10*3/mm3      Eosinophils Absolute 0.44 10*3/mm3      Anisocytosis Slight/1+     WBC Morphology Normal     Platelet Estimate Increased     Large Platelets Slight/1+    Magnesium [[id number]]  (Abnormal)  Collected: 11/02/23 2328    Specimen: Blood Updated: 11/03/23 0029     Magnesium 3.2 mg/dL     Comprehensive Metabolic Panel [[id number]]  (Abnormal) Collected: 11/02/23 2328    Specimen: Blood Updated: 11/03/23 0023     Glucose 120 mg/dL      BUN 52 mg/dL      Creatinine 0.93 mg/dL      Sodium 136 mmol/L      Potassium 4.3 mmol/L      Chloride 97 mmol/L      CO2 29.0 mmol/L      Calcium 9.2 mg/dL      Total Protein 6.4 g/dL      Albumin 3.4 g/dL      ALT (SGPT) 15 U/L      AST (SGOT) 18 U/L      Alkaline Phosphatase 143 U/L      Total Bilirubin 0.2 mg/dL      Globulin 3.0 gm/dL      A/G Ratio 1.1 g/dL      BUN/Creatinine Ratio 55.9     Anion Gap 10.0 mmol/L      eGFR 61.5 mL/min/1.73     Narrative:      GFR Normal >60  Chronic Kidney Disease <60  Kidney Failure <15    The GFR formula is only valid for adults with stable renal function between ages 18 and 70.    POC Glucose Once [[id number]]  (Abnormal) Collected: 11/03/23 0016    Specimen: Blood Updated: 11/03/23 0017     Glucose 126 mg/dL      Comment: Serial Number: [id number]:  [id number]       POC Glucose Once [[id number]]  (Abnormal) Collected: 11/02/23 1749    Specimen: Blood Updated: 11/02/23 1751     Glucose 142 mg/dL      Comment: Serial Number: [id number]:  [id number]       Immunoglobulins A/E/G/M Serum [[id number]]  (Abnormal) Collected: 10/29/23 1333    Specimen: Blood Updated: 11/02/23 1309     IgG 463 mg/dL      IgA 156 mg/dL      IgM 21 mg/dL      Comment: Result confirmed on concentration.        IgE 2 IU/mL     Narrative:      Performed at:  01 - [facility]  [id number]  : Chucky Regan PhD, Phone:  [phone]  Performed at:  02 - [facility]  [id number]  : Rudy Maldonado MD, Phone:  [phone]    POC Glucose Once [[id number]]  (Abnormal) Collected: 11/02/23 1222    Specimen: Blood Updated: 11/02/23 1223     Glucose 128 mg/dL      Comment:  Serial Number: [id number]:  [id number]               PENDING RESULTS:     IMAGING REVIEWED:    XR Chest 1 View    Result Date: 11/3/2023  Impression: Interval removal of OG tube Increased bibasilar opacities likely atelectasis or pneumonia Electronically Signed: Dwight Arredondo MD  11/3/2023 7:06 AM EDT  Workstation ID: [id number]    XR Chest 1 View    Result Date: 11/2/2023  Impression: 1. Esophagogastric tube tip below the diaphragm. 2. Persistent left basilar airspace disease which may relate to atelectasis or pneumonia with small left pleural effusion. Electronically Signed: Pavan Amezquita MD  11/2/2023 7:06 AM EDT  Workstation ID: [id number]    XR Abdomen 1 View    Result Date: 11/2/2023  Impression: Gastric suction tube terminates in the gastric lumen. Electronically Signed: Miguel Trent MD  11/2/2023 12:09 AM EDT  Workstation ID: [id number]     Assessment & Plan     ASSESSMENT:    Chronic lymphocytic leukemia: First diagnosed at the end of 2022 and she has not needed treatment since the diagnosis.  Her condition is complicated at this time but the severity of her present illness.  The sudden marked increase in the white cell count is most likely not representative of the malignant disease but rather connected to the present illness. 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 She is now off antibiotics  Normocytic anemia: Has experienced coffee-ground emesis and gastroenterology is following but patient is a poor candidate for sedation/EGD.  They plan for EGD only for life-threatening GI bleed at this time.Haptoglobin was elevated indicating low likelihood of hemolytic anemia.  Continue to monitor her hemoglobin so far remained stable.  On PPI/Carafate  Acute respiratory failure with hypoxia and hypercapnia/ARDS hospital-acquired pneumonia fluid overload and pulmonary edema.  Septic shock due to  UTI/E. coli and Klebsiella bacteremia ID is following. She has completed her course of antibiotics  Coffee-ground emesis: Not a candidate for EGD on twice daily PPI, Carafate tube feeds per primary. Hemoglobin remains stable.  NG tube feeds off.   History of stage II right breast cancer status post right mastectomy.  ER positive, [place] positive, HER2/juan luis negative.  Continue Arimidex  Nutrition via tube feeds      PLAN:    Monitor CBC and transfuse for hemoglobin less than 7.0 g/dL  Daily cbc  Serum IgG is 463  Leucocytosis is stable  Antibiotics per infectious disease.  IV Merrem completed 10/31/2023  Will continue to follow.          Electronically signed by Ling Bolden MD, 11/03/23, 5:39 PM EDT.

## 2023-11-03 NOTE — THERAPY TREATMENT NOTE
"Subjective: Pt agreeable to therapeutic plan of care.  Cognition: oriented to Person    Objective:     Bed Mobility: Mod-A   Functional Transfers: Mod-A     Balance: supported, static, and standing Min-A  Functional Ambulation: N/A or Not attempted.    Toileting: Mod-A and Assist x 2  ADL Position: supine and supported standing      Lower Body Dressing: Dependent  ADL Position: supine    Vitals: WNL    Pain: 0 VAS  Location: n/a  Interventions for pain: N/A  Education: Provided education on the importance of mobility in the acute care setting      Assessment: Diane Guan presents with ADL impairments affecting function including balance, cognition, endurance / activity tolerance, pain, and strength. Demonstrated functioning below baseline abilities indicate the need for continued skilled intervention while inpatient. Tolerating session today without incident. Will continue to follow and progress as tolerated.     Plan/Recommendations:   Moderate Intensity Therapy recommended post-acute care. This is recommended as therapy feels the patient would require 3-4 days per week and wouldn't tolerate \"3 hour daily\" rehab intensity. SNF would be the preferred choice. If the patient does not agree to SNF, arrange HH or OP depending on home bound status. If patient is medically complex, consider LTACH.. Pt requires no DME at discharge.     Pt desires Skilled Rehab placement at discharge. Pt cooperative; agreeable to therapeutic recommendations and plan of care.     Modified [place]: N/A = No pre-op stroke/TIA    Post-Tx Position: Supine with HOB Elevated  PPE: gloves    "

## 2023-11-03 NOTE — PLAN OF CARE
"Goal Outcome Evaluation:  Plan of Care Reviewed With: patient        Pt. W/o significant progress made this date, reports increased fatigue and dizziness w. OOB activity. Mod A provided for SPS transfer to and from [facility] w/ mod A x 2 to complete toileting activity. Pt. Reports \" I feel like I'm going to pass out\", returned to supine and all vitals WNL, nsg notified. Pt. Will require SNF placement at d/c to address aforementioned deficits, will follow up w/ pt. 1-3x per week at [facility].  "

## 2023-11-03 NOTE — PLAN OF CARE
Goal Outcome Evaluation:      Diane Guan presents with functional mobility impairments which indicate the need for skilled intervention. Tolerating session today without incident but limited by fatigue (just returned to bed) and LBP. Will continue to follow and progress as tolerated. Pt would benefit from SNF for continued PT at discharge

## 2023-11-03 NOTE — CASE MANAGEMENT/SOCIAL WORK
Continued Stay Note  [facility]     Patient Name: Diane Guan  MRN: [id number]  Today's Date: 11/3/2023    Admit Date: 10/10/2023    Plan: Additional SNF chocies pending. [facility] (no bed until 11/8) following. Will require precert. PASRR approved. From home with spouse.   Discharge Plan       Row Name 11/03/23 1457       Plan    Plan Additional SNF chocies pending. [facility] (no bed until 11/8) following. Will require precert. PASRR approved. From home with spouse.    Plan Comments CM contacted [facility] liaison, no bed until Wednesday. CM spoke to daughter and patient at bedside, requested family have back-up SNF choices d/t [facility] not having a bed at this time. Daughter states she will discuss with her siblings. CM confirmed with [facility] liaison that no other local facilities will have beds available to offer at this time. Barrier to D/C: 6L O2, heme/onc following, medsitter.                      Expected Discharge Date and Time       Expected Discharge Date Expected Discharge Time    Nov 6, 2023           Met with patient in room.  Maintained distance greater than six feet and spent less than 15 minutes in the room.       AURELIA WashingtonN, RN    [place] [id number]    Office: [phone]  Fax: [phone]

## 2023-11-03 NOTE — THERAPY TREATMENT NOTE
"Subjective: Pt agreeable to therapeutic plan of care. Per CNA, pt just returned to bed from sitting up in chair    Objective:     Bed mobility - rolls L and R independently with use of bed rail  Transfers - N/A or Not attempted.  Ambulation -  N/A or Not attempted.    Therapeutic Exercise - 10 Reps B LE lying supine; hip IR/ER, hip ABD/ADD, SAQ.  Performed flexed knee trunk rotation with deep breathing x 5 reps L and R for alleviation of LBP.     Repositioned several times to decrease LBP     Vitals: HR 76, SpO2 96-98% on 6L throughout, Resp 17, /66    Pain: 7 VAS   Location: Low back  Intervention for pain: Repositioned, RN notified, and Increased Activity    Education: Provided education on the importance of mobility in the acute care setting    Assessment: Diane Guan presents with functional mobility impairments which indicate the need for skilled intervention. Tolerating session today without incident but limited by fatigue (just returned to bed) and LBP. Will continue to follow and progress as tolerated. Pt would benefit from SNF for continued PT at discharge.     Plan/Recommendations:   Moderate Intensity Therapy recommended post-acute care. This is recommended as therapy feels the patient would require 3-4 days per week and wouldn't tolerate \"3 hour daily\" rehab intensity. SNF would be the preferred choice. If the patient does not agree to SNF, arrange HH or OP depending on home bound status. If patient is medically complex, consider LTACH.. Pt requires no DME at discharge.     Pt desires Skilled Rehab placement at discharge. Pt cooperative; agreeable to therapeutic recommendations and plan of care.         Basic Mobility 6-click:  Rollin = Total, A lot = 2, A little = 3; 4 = None  Supine>Sit:   1 = Total, A lot = 2, A little = 3; 4 = None   Sit>Stand with arms:  1 = Total, A lot = 2, A little = 3; 4 = None  Bed>Chair:   1 = Total, A lot = 2, A little = 3; 4 = None  Ambulate in room: "  1 = Total, A lot = 2, A little = 3; 4 = None  3-5 Steps with railin = Total, A lot = 2, A little = 3; 4 = None  Score: 11    Modified [place]: N/A = No pre-op stroke/TIA    Post-Tx Position: Supine with HOB Elevated, Alarms activated, and Call light and personal items within reach  PPE: gloves

## 2023-11-03 NOTE — SIGNIFICANT NOTE
11/03/23 1356   OTHER   Discipline physical therapist   Rehab Time/Intention   Session Not Performed patient unavailable for treatment  (with other medical discipline. Will re-attempt as schedule permits)   Therapy Assessment/Plan (PT)   Criteria for Skilled Interventions Met (PT) yes;skilled treatment is necessary

## 2023-11-03 NOTE — PROGRESS NOTES
[facility] Medicine Services   Daily Progress Note    Patient Name: Diane Guan  : 1940  MRN: [id number]  Primary Care Physician:  Asia Queen, SIOMARA  Date of admission: 10/10/2023    Subjective      Chief Complaint: Patient came in after a fall with right rib and right-sided back pain.     History of Present Illness: Diane Guan is a 83 y.o. female with past medical history of DM 2, hypertension and breast cancer who presented to [facility] on 10/10/2023 complaining of fall in the bathroom today with hitting her right side of the rib cage and right back with pain.  She says that her right leg has a little problem giving away at times and gave her while she was in the restroom today and she fell hitting her right side of the chest wall and right back on the commode.  She had pretty significant pain and that is what brought her here.  She says that she had some cough going on for about 3 weeks and was given a Z-Erick with which her cough is getting better.  She denies any fever chills shortness of breath nausea vomiting diarrhea constipation abdominal pain hematemesis hematochezia melena hemoptysis dysuria or hematuria.  Patient received a dose of Dilaudid in the ER and then her oxygen saturation dropped and she had to be started on 2 L of oxygen by nasal cannula.  I believe the Dilaudid caused some respiratory depression on top of her difficulty to take a deep breath anyways because of the pain secondary to fall has caused this acute respiratory failure.     10/29/2023 patient seen and examined in bed no acute distress, family at bedside, blood pressure 180/72.  Dyspnea on 15 L.  We were able to decrease oxygen to 8 L saturation remained 94%.  Discussed with RN.  Tolerating antibiotics.  Afebrile,  10/30/2023 patient seen and examined in bed  patient with significant dyspnea on 10 L, dysphagia, now with NG tube.  Discussed with RN.  Palliative care consulted.  Patient now  DNR.  10/31/23 patient seen and examined in bed acute distress, having significant dyspnea on 15 L of oxygen, vital signs stable, family at bedside, discussed with RN.  Creatinine 1.06  11/1/23 patient seen and examined in bed no acute distress family at bedside, dyspnea on 8 L.  Vital signs stable, patient complains of abdominal pain. now off tube feeding.  11/2/23 patient seen and examined in bed no acute distress, dyspnea on 6 L, still confused.  Discussed with RN.  To have a video swallow today.  11/3/2023 patient seen and examined in bed no acute distress, vital signs stable, dyspnea on 6 L.  Weakness and fatigue.  Discussed with RN.  Still confused.    Review of Systems   Constitutional: Negative for chills, fever and night sweats.   HENT:  Positive for congestion. Negative for hoarse voice and sore throat.    Eyes:  Negative for blurred vision and double vision.   Cardiovascular:  Positive for chest pain. Negative for dyspnea on exertion, leg swelling, near-syncope, orthopnea, palpitations and syncope.   Respiratory:  Positive for cough. Negative for hemoptysis, shortness of breath, sputum production and wheezing.    Endocrine: Negative for cold intolerance and heat intolerance.   Skin:  Negative for itching and rash.   Musculoskeletal:  Positive for back pain and myalgias. Negative for neck pain and stiffness.        Patient had a fall on admission and last fall was about a year ago.   Gastrointestinal:  Negative for abdominal pain, constipation, diarrhea, hematemesis, hematochezia, melena, nausea and vomiting.   Genitourinary:  Negative for dysuria, frequency, hematuria and urgency.   Neurological:  Negative for excessive daytime sleepiness, dizziness, focal weakness, headaches, light-headedness, loss of balance and weakness.   Psychiatric/Behavioral:  Negative for altered mental status, depression and hallucinations.       Objective      Vitals:   Temp:  [97.6 °F (36.4 °C)-98.2 °F (36.8 °C)] 97.6 °F (36.4  °C)  Heart Rate:  [73-81] 76  Resp:  [17-18] 17  BP: (111-143)/(46-66) 142/53  Flow (L/min):  [6] 6    Physical Exam     Constitutional:       General: She is awake. She is not in acute distress.     Appearance: Normal appearance.   HENT:      Mouth/Throat:      Mouth: Mucous membranes are moist.      Pharynx: Oropharynx is clear.   Eyes:      General: No scleral icterus.     Extraocular Movements: Extraocular movements intact.      Conjunctiva/sclera: Conjunctivae normal.   Cardiovascular:      Rate and Rhythm: Normal rate.      Heart sounds: No murmur heard.     No gallop.   Pulmonary:      Breath sounds: Rales (minimal bibasilar) present. No wheezing.   Abdominal:      General: Bowel sounds are normal.      Palpations: Abdomen is soft.      Tenderness: There is no abdominal tenderness.   Musculoskeletal:         General: No tenderness.      Right lower leg: No edema.      Left lower leg: No edema.   Neurological:      Mental Status: She is alert.      Comments: Alert, Awake, oriented to place and person. Generalized weakness   Psychiatric:         Behavior: Behavior is cooperative.     Result Review    Result Review:  I have personally reviewed the results from the time of this admission to 11/3/2023 14:36 EDT and agree with these findings:  [x]  Laboratory  [x]  Microbiology  [x]  Radiology  []  EKG/Telemetry   []  Cardiology/Vascular   []  Pathology  []  Old records  []  Other:  CMP:        Lab 11/02/23  2328 11/02/23  0005 11/01/23  0937 10/31/23  0844 10/30/23  1212   SODIUM 136 136 136 144 148*   POTASSIUM 4.3 4.9 4.4 3.9 4.0   CHLORIDE 97* 95* 95* 99 103   CO2 29.0 29.0 31.0* 34.0* 28.0   ANION GAP 10.0 12.0 10.0 11.0 17.0*   BUN 52* 57* 65* 63* 69*   CREATININE 0.93 0.94 0.99 1.06* 1.19*   EGFR 61.5 60.7 57.0* 52.6* 45.7*   GLUCOSE 120* 138* 98 162* 154*   CALCIUM 9.2 9.0 9.1 9.3 9.6   MAGNESIUM 3.2* 2.5* 2.5* 2.6* 2.5*   TOTAL PROTEIN 6.4 6.0 6.0 6.1 6.9   ALBUMIN 3.4* 3.4* 3.2* 3.3* 3.5   GLOBULIN 3.0  2.6 2.8 2.8 3.4   ALT (SGPT) 15 13 12 11 16   AST (SGOT) 18 24 18 20 20   BILIRUBIN 0.2 0.2 0.2 0.2 0.2   ALK PHOS 143* 137* 134* 157* 148*    CBC:      Lab 11/02/23  2328 11/02/23  0005 11/01/23  1425 10/31/23  0844 10/29/23  2331   WBC 43.90* 41.80* 37.60* 39.00* 59.40*   HEMOGLOBIN 8.5* 8.4* 8.8* 8.2* 9.1*   HEMATOCRIT 27.0* 27.3* 29.1* 27.1* 29.4*   PLATELETS 490* 500* 483* 525* 546*   NEUTROS ABS 6.59 10.45* 1.88 12.87* 10.69*   EOS ABS 0.44* 1.67* 1.13* 0.78* 1.19*   MCV 87.0 85.3 87.3 88.5 86.0        Wounds (last 24 hours)               Assessment & Plan      Brief Patient Summary:  Diane Guan is a 83 y.o. female with PMH of diabetes, hypertension, breast cancer presented to the hospital after a fall and was admitted with a principal diagnosis of Acute hypoxemic respiratory failure.  On admission, patient required 2 L nasal cannula and subsequently had worsening oxygenation with hypercapnic respiratory failure.  Failed BiPAP and subsequently was intubated on 10/15.  Initially treated with broad-spectrum antibiotics and ID was consulted.  RVP positive for rhinovirus, sputum cultures were negative.  Treated with diuretics for fluid overload.  Developed profound hypoxia and needed deep sedation.  Had bronch on 10/18.  Oxygenation improved with aggressive diuretics.  Sputum cultures grew Klebsiella and treated with meropenem.  Subsequently, extubated on 10/25 but developed hypoxia afterwards, failed BiPAP and had to be reintubated.  Also had LEXUS on admission, nephrology was consulted.  Renal ultrasound with no obstruction.  Treated with diuretics.  Subsequently, urine cultures grew both E. coli and Klebsiella pneumonia.  Treated with Zosyn for 7 days. During the hospital course, patient also developed elevated liver enzymes. Gallbladder ultrasound showed dilated common bile duct measuring 14 mm with distended gallbladder and sludge.  GI was consulted and recommended conservative management.  Liver enzymes  subsequently normalized.  Subsequently, family decided on DNR/DNI prior to extubation on 10/27.  Did well with BiPAP.      amLODIPine, 10 mg, Nasogastric, Q24H  Barium Sulfate, 1 teaspoon(s), Oral, Once in imaging  chlorthalidone, 25 mg, Nasogastric, Daily  enoxaparin, 40 mg, Subcutaneous, Daily  Ferrous Sulfate, 300 mg, Nasogastric, Once per day on Mon Wed Fri  insulin lispro, 4-24 Units, Subcutaneous, Q6H  lansoprazole, 30 mg, Nasogastric, BID  levothyroxine, 100 mcg, Nasogastric, Q AM  losartan, 25 mg, Oral, Q24H  metoclopramide, 5 mg, Nasogastric, Q8H  rOPINIRole, 0.25 mg, Nasogastric, Nightly  rosuvastatin, 10 mg, Nasogastric, Nightly  senna-docusate sodium, 2 tablet, Nasogastric, BID  sertraline, 150 mg, Nasogastric, Daily  sodium chloride, 10 mL, Intravenous, Q12H  sodium chloride, 10 mL, Intravenous, Q12H  sucralfate, 1 g, Nasogastric, 4x Daily AC & at Bedtime             Active Hospital Problems:  Active Hospital Problems    Diagnosis     **Acute hypoxemic respiratory failure     Respiratory failure     Hyperkalemia     Acute respiratory failure     History of breast cancer     Personal history of CLL (chronic lymphocytic leukemia)     History of cigarette smoking     Fall at home     Rhinovirus infection     CKD (chronic kidney disease) stage 3, GFR 30-59 ml/min     COPD (chronic obstructive pulmonary disease)     LIBBY (obstructive sleep apnea)     Vitamin D deficiency     Vitamin B 12 deficiency     Overactive bladder     Primary osteoarthritis involving multiple joints     Mixed hyperlipidemia     Mixed anxiety and depressive disorder     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     Insomnia     Hiatal hernia with gastroesophageal reflux     Type 2 diabetes mellitus with stage 3b chronic kidney disease, without long-term current use of insulin     Acquired hypothyroidism        Acute respiratory failure with hypoxia and hypercapnia / ARDS: Due to hospital-acquired pneumonia and fluid overload  with pulmonary edema.  Possible contribution from rhinovirus infection with resultant ARDS.  COPD: Acute on chronic.  Not on any home medications.  Bronchodilators as needed.  Failed extubation and had to be reintubated on 10/25.  Likely due to a combination of COPD, sleep apnea, deconditioning and ARDS.  Respiratory viral panel negative  Respiratory cultures show heavy growth of Klebsiella sensitive to cefepime  Infectious is consulted.received 10 days of IV Merrem which ended on 10/31/2023   Extubated now off BiPAP on 10/27, improving.  Wean off oxygen as tolerated.    scheduled AVAPS during the nighttime and as needed during the daytime.     Septic shock due to UTI / E. coli and Klebsiella bacteremia-Shock state has resolved.  RVP: +Rhinovirus  BAL cultures were negative.  Sputum cultures from 10/23 with Klebsiella.  ID following, Patient received 10 days of IV Merrem which ended on 10/31/2023      Acute on chronic heart failure with preserved EF: Currently well compensated.  Last ECHO showed an EF of 70% with indeterminate LV diastolic function.   On intermittent diuretics, nephrology managing the diuretic regimen.  Monitor Input/Output very closely. Follow daily weights.   Net IO Since Admission: 6,948.95 mL [10/28/23 1112]     Transaminitis with dilated common bile duct  Gallbladder ultrasound with dilated CBD, measuring 14 mm with gall sludge.  GI following.  Liver enzymes normalized.  GI originally consulted for elevated LFTs and reconsulted for coffee-ground NG output.  Hemoglobin 9.1, 9.2 yesterday.    Coffee-ground emesis with NG placement overnight and daughter at bedside reports coffee-ground emesis during admission as well.  Unfortunately, she is a poor candidate for sedation/EGD at this time as she would likely require intubation and she is currently a DNI.  We will start twice daily PPI, Carafate 4 times daily and low-dose Reglan.  Okay for tube feeds as tolerated per dietitian recommendation.  We  will monitor hemoglobin.  Plan EGD for life-threatening GI bleed only at this time.  Monitor hemoglobin and transfuse as needed     Acute toxic / metabolic encephalopathy  CT head on 10/14 with no acute pathology.  Likely due to residual effects of deep sedation for multiple days.  Some ICU delirium as well.  Mentation improving slowly     Acute kidney injury on CKD stage III-non oliguric.  Baseline creatinine of 1.3.  Likely ATN from septic shock.  Nephrology following, on intermittent diuretics.  Monitor Input/Output very closely.   Net IO Since Admission: 6,948.95 mL [10/28/23 1112]     Diabetes mellitus Type 2, not insulin-dependent : well controlled.   Hold home metformin.    Accu checks every 6 hours and c/w humalog coverage as needed.   Last A1c of 6.5.     Essential Hypertension: well controlled.   Resume home chlorthalidone.  Norvasc, Cozaar and irbesartan.  Restart medications as needed.     Iron deficiency anemia: On ferrous sulfate.  Some drop in hemoglobin with FOBT positive.  No gross bleeding, no need for emergent EGD/colonoscopy at this time.  Awaiting CBC from 10/28.     Primary hypothyroidism: Chronic and stable. Continue synthroid.    Dyslipidemia: Chronic and stable.  Continue with statins.    Breast cancer, s/p right mastectomy: Resume home anastrozole when able to swallow pills.    Chronic lymphocytic leukemia>   -oncology consulting. This was first diagnosed at the end of 2022 and she has not needed treatment since the diagnosis. Her condition is complicated at this time by the severity of the present illness. The sudden and marked increase in the white cell count most likely does not represent worsening of the malignant disease but rather is likely connected to the present illness. At this time, other than additional investigations and support I don't believe she needs intervention. One strong possibility is of hemolytic anemia and I will investigate. Also, CLL is frequently complicated by  hypogammaglobulinemia that exacerbates the immune dysfunction and this could explain some of the complications she has had.     Falls at home/functional decline-PT OT likely need nursing home discharge.    Restless leg syndrome: Continue with Requip.    Anxiety/depression: Continue Zoloft    Severe obstructive sleep apnea: API of 34.5.  Not sure if she uses any home CPAP.  Refused AVAPS in the past.    DVT prophylaxis:  Medical and mechanical DVT prophylaxis orders are present.    CODE STATUS:    Medical Intervention Limits: NO intubation (DNI)  Code Status (Patient has no pulse and is not breathing): No CPR (Do Not Attempt to Resuscitate)  Medical Interventions (Patient has pulse or is breathing): Limited Support  Comments: Spoke with daughter and  at the bedside on 10/27/2023    Disposition:  .  PT OT likely needs nursing home on discharge.    Electronically signed by Ra Myers MD, 11/03/23, 14:36 EDT.  [facility]

## 2023-11-04 ENCOUNTER — APPOINTMENT (OUTPATIENT)
Dept: GENERAL RADIOLOGY | Facility: HOSPITAL | Age: 83
DRG: 207 | End: 2023-11-04
Payer: MEDICARE

## 2023-11-04 LAB
ALBUMIN SERPL-MCNC: 3.4 G/DL (ref 3.5–5.2)
ALBUMIN/GLOB SERPL: 1.3 G/DL
ALP SERPL-CCNC: 142 U/L (ref 39–117)
ALT SERPL W P-5'-P-CCNC: 12 U/L (ref 1–33)
ANION GAP SERPL CALCULATED.3IONS-SCNC: 9 MMOL/L (ref 5–15)
AST SERPL-CCNC: 17 U/L (ref 1–32)
BILIRUB SERPL-MCNC: 0.2 MG/DL (ref 0–1.2)
BUN SERPL-MCNC: 38 MG/DL (ref 8–23)
BUN/CREAT SERPL: 36.9 (ref 7–25)
CALCIUM SPEC-SCNC: 9.6 MG/DL (ref 8.6–10.5)
CHLORIDE SERPL-SCNC: 99 MMOL/L (ref 98–107)
CO2 SERPL-SCNC: 29 MMOL/L (ref 22–29)
CREAT SERPL-MCNC: 1.03 MG/DL (ref 0.57–1)
DEPRECATED RDW RBC AUTO: 49.4 FL (ref 37–54)
EGFRCR SERPLBLD CKD-EPI 2021: 54.4 ML/MIN/1.73
EOSINOPHIL # BLD MANUAL: 1.01 10*3/MM3 (ref 0–0.4)
EOSINOPHIL NFR BLD MANUAL: 3 % (ref 0.3–6.2)
ERYTHROCYTE [DISTWIDTH] IN BLOOD BY AUTOMATED COUNT: 15.4 % (ref 12.3–15.4)
GLOBULIN UR ELPH-MCNC: 2.6 GM/DL
GLUCOSE BLDC GLUCOMTR-MCNC: 103 MG/DL (ref 70–105)
GLUCOSE BLDC GLUCOMTR-MCNC: 109 MG/DL (ref 70–105)
GLUCOSE BLDC GLUCOMTR-MCNC: 140 MG/DL (ref 70–105)
GLUCOSE BLDC GLUCOMTR-MCNC: 159 MG/DL (ref 70–105)
GLUCOSE SERPL-MCNC: 113 MG/DL (ref 65–99)
HCT VFR BLD AUTO: 28 % (ref 34–46.6)
HGB BLD-MCNC: 8.6 G/DL (ref 12–15.9)
LARGE PLATELETS: ABNORMAL
LYMPHOCYTES # BLD MANUAL: 28.22 10*3/MM3 (ref 0.7–3.1)
LYMPHOCYTES NFR BLD MANUAL: 2 % (ref 5–12)
MAGNESIUM SERPL-MCNC: 2.9 MG/DL (ref 1.6–2.4)
MCH RBC QN AUTO: 26.8 PG (ref 26.6–33)
MCHC RBC AUTO-ENTMCNC: 30.8 G/DL (ref 31.5–35.7)
MCV RBC AUTO: 87 FL (ref 79–97)
MONOCYTES # BLD: 0.67 10*3/MM3 (ref 0.1–0.9)
NEUTROPHILS # BLD AUTO: 3.7 10*3/MM3 (ref 1.7–7)
NEUTROPHILS NFR BLD MANUAL: 11 % (ref 42.7–76)
PLATELET # BLD AUTO: 398 10*3/MM3 (ref 140–450)
PMV BLD AUTO: 8.9 FL (ref 6–12)
POIKILOCYTOSIS BLD QL SMEAR: ABNORMAL
POTASSIUM SERPL-SCNC: 4.3 MMOL/L (ref 3.5–5.2)
PROT SERPL-MCNC: 6 G/DL (ref 6–8.5)
RBC # BLD AUTO: 3.22 10*6/MM3 (ref 3.77–5.28)
SCAN SLIDE: NORMAL
SMUDGE CELLS BLD QL SMEAR: ABNORMAL
SODIUM SERPL-SCNC: 137 MMOL/L (ref 136–145)
VARIANT LYMPHS NFR BLD MANUAL: 84 % (ref 19.6–45.3)
WBC NRBC COR # BLD: 33.6 10*3/MM3 (ref 3.4–10.8)

## 2023-11-04 PROCEDURE — 99232 SBSQ HOSP IP/OBS MODERATE 35: CPT | Performed by: INTERNAL MEDICINE

## 2023-11-04 PROCEDURE — 71045 X-RAY EXAM CHEST 1 VIEW: CPT

## 2023-11-04 PROCEDURE — 83735 ASSAY OF MAGNESIUM: CPT | Performed by: STUDENT IN AN ORGANIZED HEALTH CARE EDUCATION/TRAINING PROGRAM

## 2023-11-04 PROCEDURE — 25010000002 ENOXAPARIN PER 10 MG: Performed by: INTERNAL MEDICINE

## 2023-11-04 PROCEDURE — 82948 REAGENT STRIP/BLOOD GLUCOSE: CPT

## 2023-11-04 PROCEDURE — 80053 COMPREHEN METABOLIC PANEL: CPT | Performed by: STUDENT IN AN ORGANIZED HEALTH CARE EDUCATION/TRAINING PROGRAM

## 2023-11-04 PROCEDURE — 63710000001 INSULIN LISPRO (HUMAN) PER 5 UNITS: Performed by: INTERNAL MEDICINE

## 2023-11-04 PROCEDURE — 85025 COMPLETE CBC W/AUTO DIFF WBC: CPT | Performed by: NURSE PRACTITIONER

## 2023-11-04 PROCEDURE — 85007 BL SMEAR W/DIFF WBC COUNT: CPT | Performed by: NURSE PRACTITIONER

## 2023-11-04 RX ORDER — ANASTROZOLE 1 MG/1
1 TABLET ORAL DAILY
Status: DISCONTINUED | OUTPATIENT
Start: 2023-11-04 | End: 2023-11-06 | Stop reason: HOSPADM

## 2023-11-04 RX ADMIN — Medication 10 ML: at 20:23

## 2023-11-04 RX ADMIN — LOSARTAN POTASSIUM 25 MG: 25 TABLET, FILM COATED ORAL at 09:18

## 2023-11-04 RX ADMIN — CHLORTHALIDONE 25 MG: 25 TABLET ORAL at 09:16

## 2023-11-04 RX ADMIN — SUCRALFATE 1 G: 1 TABLET ORAL at 20:23

## 2023-11-04 RX ADMIN — ANASTROZOLE 1 MG: 1 TABLET ORAL at 17:07

## 2023-11-04 RX ADMIN — SUCRALFATE 1 G: 1 TABLET ORAL at 17:07

## 2023-11-04 RX ADMIN — Medication 10 ML: at 09:17

## 2023-11-04 RX ADMIN — METOCLOPRAMIDE 5 MG: 5 TABLET ORAL at 18:03

## 2023-11-04 RX ADMIN — SENNOSIDES AND DOCUSATE SODIUM 2 TABLET: 8.6; 5 TABLET ORAL at 20:22

## 2023-11-04 RX ADMIN — LANSOPRAZOLE 30 MG: 30 TABLET, ORALLY DISINTEGRATING ORAL at 09:17

## 2023-11-04 RX ADMIN — SUCRALFATE 1 G: 1 TABLET ORAL at 12:37

## 2023-11-04 RX ADMIN — Medication 10 ML: at 20:38

## 2023-11-04 RX ADMIN — ACETAMINOPHEN 650 MG: 325 TABLET, FILM COATED ORAL at 18:02

## 2023-11-04 RX ADMIN — METOCLOPRAMIDE 5 MG: 5 TABLET ORAL at 09:17

## 2023-11-04 RX ADMIN — ENOXAPARIN SODIUM 40 MG: 40 INJECTION, SOLUTION SUBCUTANEOUS at 17:07

## 2023-11-04 RX ADMIN — INSULIN LISPRO 4 UNITS: 100 INJECTION, SOLUTION INTRAVENOUS; SUBCUTANEOUS at 12:32

## 2023-11-04 RX ADMIN — ROSUVASTATIN 10 MG: 10 TABLET, FILM COATED ORAL at 20:23

## 2023-11-04 RX ADMIN — LEVOTHYROXINE SODIUM 100 MCG: 0.1 TABLET ORAL at 04:48

## 2023-11-04 RX ADMIN — LEVOTHYROXINE SODIUM 100 MCG: 0.1 TABLET ORAL at 09:24

## 2023-11-04 RX ADMIN — OXYCODONE HYDROCHLORIDE 5 MG: 5 TABLET ORAL at 04:48

## 2023-11-04 RX ADMIN — ROPINIROLE HYDROCHLORIDE 0.25 MG: 1 TABLET, FILM COATED ORAL at 20:23

## 2023-11-04 RX ADMIN — SUCRALFATE 1 G: 1 TABLET ORAL at 09:16

## 2023-11-04 RX ADMIN — SERTRALINE 150 MG: 100 TABLET, FILM COATED ORAL at 09:16

## 2023-11-04 RX ADMIN — LANSOPRAZOLE 30 MG: 30 TABLET, ORALLY DISINTEGRATING ORAL at 20:23

## 2023-11-04 RX ADMIN — CYCLOBENZAPRINE 10 MG: 10 TABLET, FILM COATED ORAL at 04:48

## 2023-11-04 RX ADMIN — SENNOSIDES AND DOCUSATE SODIUM 2 TABLET: 8.6; 5 TABLET ORAL at 09:15

## 2023-11-04 RX ADMIN — OXYCODONE HYDROCHLORIDE 5 MG: 5 TABLET ORAL at 14:47

## 2023-11-04 RX ADMIN — AMLODIPINE BESYLATE 10 MG: 5 TABLET ORAL at 09:16

## 2023-11-04 NOTE — PROGRESS NOTES
[facility] Medicine Services   Daily Progress Note    Patient Name: Diane Guan  : 1940  MRN: [id number]  Primary Care Physician:  Asia Queen, SIOMARA  Date of admission: 10/10/2023    Subjective      Chief Complaint: Patient came in after a fall with right rib and right-sided back pain.     History of Present Illness: Diane Guan is a 83 y.o. female with past medical history of DM 2, hypertension and breast cancer who presented to [facility] on 10/10/2023 complaining of fall in the bathroom today with hitting her right side of the rib cage and right back with pain.  She says that her right leg has a little problem giving away at times and gave her while she was in the restroom today and she fell hitting her right side of the chest wall and right back on the commode.  She had pretty significant pain and that is what brought her here.  She says that she had some cough going on for about 3 weeks and was given a Z-Erick with which her cough is getting better.  She denies any fever chills shortness of breath nausea vomiting diarrhea constipation abdominal pain hematemesis hematochezia melena hemoptysis dysuria or hematuria.  Patient received a dose of Dilaudid in the ER and then her oxygen saturation dropped and she had to be started on 2 L of oxygen by nasal cannula.  I believe the Dilaudid caused some respiratory depression on top of her difficulty to take a deep breath anyways because of the pain secondary to fall has caused this acute respiratory failure.     10/29/2023 patient seen and examined in bed no acute distress, family at bedside, blood pressure 180/72.  Dyspnea on 15 L.  We were able to decrease oxygen to 8 L saturation remained 94%.  Discussed with RN.  Tolerating antibiotics.  Afebrile,  10/30/2023 patient seen and examined in bed  patient with significant dyspnea on 10 L, dysphagia, now with NG tube.  Discussed with RN.  Palliative care consulted.  Patient now  DNR.  10/31/23 patient seen and examined in bed acute distress, having significant dyspnea on 15 L of oxygen, vital signs stable, family at bedside, discussed with RN.  Creatinine 1.06  11/1/23 patient seen and examined in bed no acute distress family at bedside, dyspnea on 8 L.  Vital signs stable, patient complains of abdominal pain. now off tube feeding.  11/2/23 patient seen and examined in bed no acute distress, dyspnea on 6 L, still confused.  Discussed with RN.  To have a video swallow today.  11/3/2023 patient seen and examined in bed no acute distress, vital signs stable, dyspnea on 6 L.  Weakness and fatigue.  Discussed with RN.  Still confused.    11/04/23  Reviewed prior charts.  I evaluated the patient at bedside this morning.  Patient's daughter is at bedside.  Patient reports improvement in shortness of breath.  The mental status has improved.  She denies any new symptoms.       Objective      Vitals:   Temp:  [98.2 °F (36.8 °C)-98.5 °F (36.9 °C)] 98.5 °F (36.9 °C)  Heart Rate:  [65-74] 74  Resp:  [12-22] 22  BP: (112-142)/(48-82) 112/48  Flow (L/min):  [4-6] 4    Physical Exam   Constitutional:       General: She is awake. She is not in acute distress.     Appearance: Normal appearance.   HENT:      Mouth/Throat:      Mouth: Mucous membranes are moist.      Pharynx: Oropharynx is clear.   Eyes:      General: No scleral icterus.     Extraocular Movements: Extraocular movements intact.      Conjunctiva/sclera: Conjunctivae normal.   Cardiovascular:      Rate and Rhythm: Normal rate.      Heart sounds: No murmur heard.     No gallop.   Pulmonary:      Breath sounds: Rales (minimal bibasilar) present. No wheezing.   Abdominal:      General: Bowel sounds are normal.      Palpations: Abdomen is soft.      Tenderness: There is no abdominal tenderness.   Musculoskeletal:         General: No tenderness.      Right lower leg: No edema.      Left lower leg: No edema.   Neurological:      Mental Status: She is  alert.      Comments: Alert, Awake, oriented to place and person. Generalized weakness   Psychiatric:         Behavior: Behavior is cooperative.     Result Review    Result Review:  I have personally reviewed the results from the time of this admission to 11/4/2023 15:44 EDT and agree with these findings:  [x]  Laboratory  [x]  Microbiology  [x]  Radiology  []  EKG/Telemetry   []  Cardiology/Vascular   []  Pathology  [x]  Old records  []  Other:    Significant findings: WBC 33.6 (improved), hemoglobin 8.6, BUN 38, creatinine 1.03  Chest x-ray 11/4/2023  Stable bibasilar airspace opacities, which may be due to atelectasis and/or pneumonia with possible small pleural effusion.      CMP:        Lab 11/04/23  0800 11/02/23 2328 11/02/23  0005 11/01/23  0937 10/31/23  0844   SODIUM 137 136 136 136 144   POTASSIUM 4.3 4.3 4.9 4.4 3.9   CHLORIDE 99 97* 95* 95* 99   CO2 29.0 29.0 29.0 31.0* 34.0*   ANION GAP 9.0 10.0 12.0 10.0 11.0   BUN 38* 52* 57* 65* 63*   CREATININE 1.03* 0.93 0.94 0.99 1.06*   EGFR 54.4* 61.5 60.7 57.0* 52.6*   GLUCOSE 113* 120* 138* 98 162*   CALCIUM 9.6 9.2 9.0 9.1 9.3   MAGNESIUM 2.9* 3.2* 2.5* 2.5* 2.6*   TOTAL PROTEIN 6.0 6.4 6.0 6.0 6.1   ALBUMIN 3.4* 3.4* 3.4* 3.2* 3.3*   GLOBULIN 2.6 3.0 2.6 2.8 2.8   ALT (SGPT) 12 15 13 12 11   AST (SGOT) 17 18 24 18 20   BILIRUBIN 0.2 0.2 0.2 0.2 0.2   ALK PHOS 142* 143* 137* 134* 157*    CBC:      Lab 11/04/23  0800 11/02/23 2328 11/02/23  0005 11/01/23  1425 10/31/23  0844   WBC 33.60* 43.90* 41.80* 37.60* 39.00*   HEMOGLOBIN 8.6* 8.5* 8.4* 8.8* 8.2*   HEMATOCRIT 28.0* 27.0* 27.3* 29.1* 27.1*   PLATELETS 398 490* 500* 483* 525*   NEUTROS ABS 3.70 6.59 10.45* 1.88 12.87*   EOS ABS 1.01* 0.44* 1.67* 1.13* 0.78*   MCV 87.0 87.0 85.3 87.3 88.5        Wounds (last 24 hours)               Assessment & Plan      Brief Patient Summary:  Diane Guan is a 83 y.o. female with PMH of diabetes, hypertension, breast cancer presented to the hospital after a fall  and was admitted with a principal diagnosis of Acute hypoxemic respiratory failure.  On admission, patient required 2 L nasal cannula and subsequently had worsening oxygenation with hypercapnic respiratory failure.  Failed BiPAP and subsequently was intubated on 10/15.  Initially treated with broad-spectrum antibiotics and ID was consulted.  RVP positive for rhinovirus, sputum cultures were negative.  Treated with diuretics for fluid overload.  Developed profound hypoxia and needed deep sedation.  Had bronch on 10/18.  Oxygenation improved with aggressive diuretics.  Sputum cultures grew Klebsiella and treated with meropenem.  Subsequently, extubated on 10/25 but developed hypoxia afterwards, failed BiPAP and had to be reintubated.  Also had LEXUS on admission, nephrology was consulted.  Renal ultrasound with no obstruction.  Treated with diuretics.  Subsequently, urine cultures grew both E. coli and Klebsiella pneumonia.  Treated with Zosyn for 7 days. During the hospital course, patient also developed elevated liver enzymes. Gallbladder ultrasound showed dilated common bile duct measuring 14 mm with distended gallbladder and sludge.  GI was consulted and recommended conservative management.  Liver enzymes subsequently normalized.  Subsequently, family decided on DNR/DNI prior to extubation on 10/27.  Did well with BiPAP.      amLODIPine, 10 mg, Nasogastric, Q24H  Barium Sulfate, 1 teaspoon(s), Oral, Once in imaging  chlorthalidone, 25 mg, Nasogastric, Daily  enoxaparin, 40 mg, Subcutaneous, Daily  Ferrous Sulfate, 300 mg, Nasogastric, Once per day on Mon Wed Fri  insulin lispro, 4-24 Units, Subcutaneous, Q6H  lansoprazole, 30 mg, Nasogastric, BID  levothyroxine, 100 mcg, Nasogastric, Q AM  losartan, 25 mg, Oral, Q24H  metoclopramide, 5 mg, Nasogastric, Q8H  rOPINIRole, 0.25 mg, Nasogastric, Nightly  rosuvastatin, 10 mg, Nasogastric, Nightly  senna-docusate sodium, 2 tablet, Nasogastric, BID  sertraline, 150 mg,  Nasogastric, Daily  sodium chloride, 10 mL, Intravenous, Q12H  sodium chloride, 10 mL, Intravenous, Q12H  sucralfate, 1 g, Nasogastric, 4x Daily AC & at Bedtime             Active Hospital Problems:  Active Hospital Problems    Diagnosis     **Acute hypoxemic respiratory failure     Respiratory failure     Hyperkalemia     Acute respiratory failure     History of breast cancer     Personal history of CLL (chronic lymphocytic leukemia)     History of cigarette smoking     Fall at home     Rhinovirus infection     CKD (chronic kidney disease) stage 3, GFR 30-59 ml/min     COPD (chronic obstructive pulmonary disease)     LIBBY (obstructive sleep apnea)     Vitamin D deficiency     Vitamin B 12 deficiency     Overactive bladder     Primary osteoarthritis involving multiple joints     Mixed hyperlipidemia     Mixed anxiety and depressive disorder     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     Insomnia     Hiatal hernia with gastroesophageal reflux     Type 2 diabetes mellitus with stage 3b chronic kidney disease, without long-term current use of insulin     Acquired hypothyroidism        # Acute respiratory failure with hypoxia and hypercapnia / ARDS: Due to hospital-acquired pneumonia and fluid overload with pulmonary edema.  Possible contribution from rhinovirus infection with resultant ARDS.  # COPD: Acute on chronic.  Not on any home medications.  Bronchodilators as needed.  Failed extubation and had to be reintubated on 10/25.  Likely due to a combination of COPD, sleep apnea, deconditioning and ARDS.  Respiratory viral panel negative  Respiratory cultures show heavy growth of Klebsiella sensitive to cefepime  Infectious is consulted.received 10 days of IV Merrem which ended on 10/31/2023   Extubated now off BiPAP on 10/27, improving.  Wean off oxygen as tolerated.    scheduled AVAPS during the nighttime and as needed during the daytime.     # Septic shock due to UTI / E. coli and Klebsiella  bacteremia-Shock state has resolved.  RVP: +Rhinovirus  BAL cultures were negative.  Sputum cultures from 10/23 with Klebsiella.  ID following, Patient received 10 days of IV Merrem which ended on 10/31/2023      # Acute on chronic heart failure with preserved EF: Currently well compensated.  Last ECHO showed an EF of 70% with indeterminate LV diastolic function.   On intermittent diuretics, nephrology managing the diuretic regimen.  Monitor Input/Output very closely. Follow daily weights.     # Transaminitis with dilated common bile duct  # Coffee-ground Emesis - resolved  Gallbladder ultrasound with dilated CBD, measuring 14 mm with gall sludge.  GI following.  Liver enzymes normalized.  GI originally consulted for elevated LFTs and reconsulted for coffee-ground NG output.  Hemoglobin stable ~8.5-9  Patient not candidate for EGD due to respiratory status, EGD only if life-threatening GI bleed  Monitor Hb, continue PPI BID, carafate and low-dose reglan  Tolerating regular diet now     # Acute toxic / metabolic encephalopathy - Improved  CT head on 10/14 with no acute pathology.  Likely due to residual effects of deep sedation for multiple days.  Some ICU delirium as well.  Mentation improving slowly     # Acute kidney injury on CKD stage III - LEXUS resolved  Likely ATN from septic shock.  Nephrology following, on intermittent diuretics.  Monitor BMP  Avoid nephrotoxic meds    # Diabetes mellitus Type 2, not insulin-dependent : well controlled.   Hold home metformin.    Monitor Fingersticks AC/HS.   Sliding scale insulin  Last A1c of 6.5.     # Essential Hypertension: well controlled.   continue chlorthalidone.  Norvasc, Cozaar and irbesartan.    # Iron deficiency anemia: On ferrous sulfate.  Some drop in hemoglobin with FOBT positive.  No gross bleeding, no need for emergent EGD/colonoscopy at this time.  Hb stable     # Primary hypothyroidism: Chronic and stable. Continue synthroid.    # Dyslipidemia: Chronic and  stable.  Continue with statins.    # Breast cancer, s/p right mastectomy: Continue Anastrazole    # Chronic lymphocytic leukemia   - Oncology following  - This was first diagnosed at the end of 2022 and she has not needed treatment since the diagnosis. Her condition is complicated at this time by the severity of the present illness. The sudden and marked increase in the white cell count most likely does not represent worsening of the malignant disease but rather is likely connected to the present illness.   - Monitor CBC, WBC count improved.     # Falls at home/functional decline  - PT OT likely need nursing home discharge.    # Restless leg syndrome: Continue with Requip.    # Anxiety/depression: Continue Zoloft, Ativan PRN    # Severe obstructive sleep apnea:  - outpatient Sleep medicine follow up      DVT prophylaxis:  Medical and mechanical DVT prophylaxis orders are present.    CODE STATUS:    Medical Intervention Limits: NO intubation (DNI)  Code Status (Patient has no pulse and is not breathing): No CPR (Do Not Attempt to Resuscitate)  Medical Interventions (Patient has pulse or is breathing): Limited Support  Comments: Spoke with daughter and  at the bedside on 10/27/2023    Disposition: Patient will likely be discharged to rehab in 2 to 3 days.    Discussed with patient and her daughter at bedside.  Discussed with RN and .    Electronically signed by Christian Christina MD, 11/04/23, 15:44 EDT.  [facility]ist Team

## 2023-11-04 NOTE — PROGRESS NOTES
Hematology/Oncology Inpatient Progress Note    PATIENT NAME: Diane Guan  : 1940  MRN: [id number]    CHIEF COMPLAINT: Falls, respiratory failure, leukocytosis with lymphocytosis    HISTORY OF PRESENT ILLNESS:      Diane Guan is a 83 y.o. female who presented to [facility] on 10/10/2023 with complaints of      MsZachery Guan was admitted to the hospital on 10/10/2023. She had fallen, sustaining some trauma to the chest. She complained of weakness of the right lower extremity. In addition she had a respiratory panel positive for rhinovirus. She had been treated in the past for early stage breast cancer and had remained free of evidence of recurrent disease. In addition she was known for chronic lymphocytic leukemia and had maintained significant lymphocytosis for some time but had never needed treatment. At the time of the admission she had maintained a similar white cell count. Today for the first time she had a very significant increase in the total white cell count and platelets. In addition she had significant thrombocytosis.   In spite of all efforts she had very slow improvement. She had continued to have difficulties with weakness and anorexia. She developed pneumonia and had to be intubated; she was finally extubated on 10/27.  On 10/23/2023 she was found to have positive blood cultures for E coli and Klebsiella. At the time of this consult she was still on antibiotics. She also had had evidence of gastrointestinal bleeding; she was diagnosed with iron deficiency and was started on iron but iron studies were not available.      He/She  has a past medical history of Acute bronchitis due to human metapneumovirus (2020), Anxiety disorder, Arthritis, Breast cancer (2019), Chronic lymphocytic leukemia (CLL), B-cell (2019), Depression, Disease of thyroid gland, Diverticulosis of colon (), Dysphagia (2020), Dyspnea on exertion, Fall at home (10/11/2023),  Hemorrhoid, Hiatal hernia (12/2020), Hiatal hernia with GERD, History of breast cancer (10/11/2023), History of cigarette smoking (10/11/2023), History of diabetes mellitus, Hyperlipidemia, Hypertension, Mycobacterium avium complex (02/2019), OAB (overactive bladder), Personal history of CLL (chronic lymphocytic leukemia) (10/11/2023), Skin cancer, and Sleep apnea.     PCP: Asia Queen APRN    Subjective       Patient was up this morning.  According to daughter had good breakfast      ROS:    Review of Systems   Constitutional:  Positive for activity change, appetite change and fatigue. Negative for chills and fever.   HENT:  Negative for congestion, drooling, ear discharge, rhinorrhea, sinus pressure and tinnitus.    Eyes:  Negative for photophobia, pain and discharge.   Respiratory:  Negative for apnea, choking and stridor.    Cardiovascular:  Negative for palpitations.   Gastrointestinal:  Negative for abdominal distention, abdominal pain and anal bleeding.   Endocrine: Negative for polydipsia and polyphagia.   Genitourinary:  Negative for decreased urine volume, flank pain and genital sores.   Musculoskeletal:  Negative for gait problem, neck pain and neck stiffness.   Skin:  Negative for color change, rash and wound.   Neurological:  Positive for weakness. Negative for tremors, seizures, syncope, facial asymmetry and speech difficulty.   Hematological:  Negative for adenopathy.   Psychiatric/Behavioral:  Negative for agitation, confusion, hallucinations and self-injury. The patient is not hyperactive.         MEDICATIONS:    Scheduled Meds:  amLODIPine, 10 mg, Nasogastric, Q24H  Barium Sulfate, 1 teaspoon(s), Oral, Once in imaging  chlorthalidone, 25 mg, Nasogastric, Daily  enoxaparin, 40 mg, Subcutaneous, Daily  Ferrous Sulfate, 300 mg, Nasogastric, Once per day on Mon Wed Fri  insulin lispro, 4-24 Units, Subcutaneous, Q6H  lansoprazole, 30 mg, Nasogastric, BID  levothyroxine, 100 mcg, Nasogastric, Q  "AM  losartan, 25 mg, Oral, Q24H  metoclopramide, 5 mg, Nasogastric, Q8H  rOPINIRole, 0.25 mg, Nasogastric, Nightly  rosuvastatin, 10 mg, Nasogastric, Nightly  senna-docusate sodium, 2 tablet, Nasogastric, BID  sertraline, 150 mg, Nasogastric, Daily  sodium chloride, 10 mL, Intravenous, Q12H  sodium chloride, 10 mL, Intravenous, Q12H  sucralfate, 1 g, Nasogastric, 4x Daily AC & at Bedtime       Continuous Infusions:      PRN Meds:    acetaminophen    cyclobenzaprine    dextrose    dextrose    dextrose    glucagon (human recombinant)    hydrALAZINE    ipratropium-albuterol    lidocaine    LORazepam    nitroglycerin    ondansetron    oxyCODONE    polyethylene glycol    [COMPLETED] Insert Peripheral IV **AND** sodium chloride    sodium chloride    sodium chloride    sodium chloride    sodium chloride     ALLERGIES:  No Known Allergies    Objective    VITALS:   /63 (BP Location: Left arm, Patient Position: Lying)   Pulse 73   Temp 98.5 °F (36.9 °C) (Axillary)   Resp 12   Ht 160 cm (63\")   Wt 78.5 kg (173 lb 1 oz)   SpO2 95%   BMI 30.66 kg/m²     PHYSICAL EXAM: (performed by MD)  Physical Exam  Constitutional:       General: She is in acute distress.      Appearance: She is ill-appearing.   HENT:      Head:      Comments: Ng tube in palce     Mouth/Throat:      Mouth: Mucous membranes are moist.      Pharynx: Oropharynx is clear.   Eyes:      Extraocular Movements: Extraocular movements intact.      Pupils: Pupils are equal, round, and reactive to light.   Pulmonary:      Effort: Pulmonary effort is normal.   Neurological:      General: No focal deficit present.      Mental Status: Mental status is at baseline.       I have reexamined the patient and the results are consistent with the previously documented exam. Ling Bolden MD      RECENT LABS:    Lab Results (last 24 hours)       Procedure Component Value Units Date/Time    POC Glucose Once [[id number]]  (Abnormal) Collected: 11/04/23 1200    " Specimen: Blood Updated: 11/04/23 1202     Glucose 159 mg/dL      Comment: Serial Number: [id number]:  [id number]       CBC & Differential [[id number]]  (Abnormal) Collected: 11/04/23 0800    Specimen: Blood Updated: 11/04/23 0917    Narrative:      The following orders were created for panel order CBC & Differential.  Procedure                               Abnormality         Status                     ---------                               -----------         ------                     CBC Auto Differential[[id number]]        Abnormal            Final result               Scan Slide[[id number]]                                       Final result                 Please view results for these tests on the individual orders.    CBC Auto Differential [[id number]]  (Abnormal) Collected: 11/04/23 0800    Specimen: Blood Updated: 11/04/23 0917     WBC 33.60 10*3/mm3      RBC 3.22 10*6/mm3      Hemoglobin 8.6 g/dL      Hematocrit 28.0 %      MCV 87.0 fL      MCH 26.8 pg      MCHC 30.8 g/dL      RDW 15.4 %      RDW-SD 49.4 fl      MPV 8.9 fL      Platelets 398 10*3/mm3     Narrative:      The previously reported component NRBC is no longer being reported. Previous result was 0.0 /100 WBC (Reference Range: 0.0-0.2 /100 WBC) on 11/4/2023 at 0829 EDT.    Scan Slide [[id number]] Collected: 11/04/23 0800    Specimen: Blood Updated: 11/04/23 0917     Scan Slide --     Comment: See Manual Differential Results       Manual Differential [[id number]]  (Abnormal) Collected: 11/04/23 0800    Specimen: Blood Updated: 11/04/23 0917     Neutrophil % 11.0 %      Lymphocyte % 84.0 %      Monocyte % 2.0 %      Eosinophil % 3.0 %      Neutrophils Absolute 3.70 10*3/mm3      Lymphocytes Absolute 28.22 10*3/mm3      Monocytes Absolute 0.67 10*3/mm3      Eosinophils Absolute 1.01 10*3/mm3      Poikilocytes Slight/1+     Smudge Cells Slight/1+     Large Platelets Slight/1+    Narrative:      Reviewed by Pathologist within the past 30 days on  10.11.2023.      Magnesium [[id number]]  (Abnormal) Collected: 11/04/23 0800    Specimen: Blood Updated: 11/04/23 0827     Magnesium 2.9 mg/dL     Comprehensive Metabolic Panel [[id number]]  (Abnormal) Collected: 11/04/23 0800    Specimen: Blood Updated: 11/04/23 0827     Glucose 113 mg/dL      BUN 38 mg/dL      Creatinine 1.03 mg/dL      Sodium 137 mmol/L      Potassium 4.3 mmol/L      Chloride 99 mmol/L      CO2 29.0 mmol/L      Calcium 9.6 mg/dL      Total Protein 6.0 g/dL      Albumin 3.4 g/dL      ALT (SGPT) 12 U/L      AST (SGOT) 17 U/L      Alkaline Phosphatase 142 U/L      Total Bilirubin 0.2 mg/dL      Globulin 2.6 gm/dL      A/G Ratio 1.3 g/dL      BUN/Creatinine Ratio 36.9     Anion Gap 9.0 mmol/L      eGFR 54.4 mL/min/1.73     Narrative:      GFR Normal >60  Chronic Kidney Disease <60  Kidney Failure <15    The GFR formula is only valid for adults with stable renal function between ages 18 and 70.    POC Glucose Once [[id number]]  (Abnormal) Collected: 11/04/23 0604    Specimen: Blood Updated: 11/04/23 0606     Glucose 109 mg/dL      Comment: Serial Number: [id number]:  [id number]       POC Glucose Once [[id number]]  (Abnormal) Collected: 11/03/23 2352    Specimen: Blood Updated: 11/03/23 2354     Glucose 107 mg/dL      Comment: Serial Number: [id number]:  [id number]       POC Glucose Once [[id number]]  (Abnormal) Collected: 11/03/23 1708    Specimen: Blood Updated: 11/03/23 1710     Glucose 106 mg/dL      Comment: Serial Number: [id number]:  [id number]               PENDING RESULTS:     IMAGING REVIEWED:    XR Chest 1 View    Result Date: 11/4/2023  Impression: Stable bibasilar airspace opacities, which may be due to atelectasis and/or pneumonia with possible small pleural effusions. Electronically Signed: Jacquie Arroyo  11/4/2023 8:36 AM EDT  Workstation ID: [id number]    XR Chest 1 View    Result Date: 11/3/2023  Impression: Interval removal of OG tube Increased bibasilar opacities likely  atelectasis or pneumonia Electronically Signed: Dwight Arredondo MD  11/3/2023 7:06 AM EDT  Workstation ID: [id number]     Assessment & Plan     ASSESSMENT:    Chronic lymphocytic leukemia: First diagnosed at the end of 2022 and she has not needed treatment since the diagnosis.  Her condition is complicated at this time but the severity of her present illness.  The sudden marked increase in the white cell count is most likely not representative of the malignant disease but rather connected to the present illness. 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 She is now off antibiotics.  White count 33,000  Normocytic anemia: Has experienced coffee-ground emesis and gastroenterology is following but patient is a poor candidate for sedation/EGD.  They plan for EGD only for life-threatening GI bleed at this time.Haptoglobin was elevated indicating low likelihood of hemolytic anemia.   On PPI/Carafate.  Hemoglobin remained stable  Acute respiratory failure with hypoxia and hypercapnia/ARDS hospital-acquired pneumonia fluid overload and pulmonary edema.  Septic shock due to UTI/E. coli and Klebsiella bacteremia ID is following. She has completed her course of antibiotics  Coffee-ground emesis: Not a candidate for EGD on twice daily PPI, Carafate tube feeds per primary. Hemoglobin remains stable.  NG tube feeds off.  Oral intake has also improved  History of stage II right breast cancer status post right mastectomy.  ER positive, AK positive, HER2/juan luis negative.  Continue Arimidex  Nutrition via tube feeds      PLAN:    Monitor CBC and transfuse for hemoglobin less than 7.0 g/dL  Daily CBCs  Serum IgG is 463  Leucocytosis is stable  Antibiotics per infectious disease.  IV Merrem completed 10/31/2023  Will continue to follow.              Electronically signed by Ling Bolden MD, 11/04/23, 1:23 PM EDT.

## 2023-11-04 NOTE — PLAN OF CARE
Goal Outcome Evaluation:      Pt has been on 4-6 L highflow  today., Daughter has been at bedside all day No problem taking medication and pt ate well. Continue to monitor

## 2023-11-04 NOTE — PROGRESS NOTES
Name: Diane Guan  Age: 83 y.o.  : 1940  Sex: female    23    Subjective   Pt admitted s/p fall     Interval History     No complaints     Objective:    Vital Signs  Temp:  [97.5 °F (36.4 °C)-98.5 °F (36.9 °C)] 98.5 °F (36.9 °C)  Heart Rate:  [65-73] 73  Resp:  [12-18] 12  BP: (116-142)/(52-82) 128/63        Intake/Output Summary (Last 24 hours) at 2023 1405  Last data filed at 2023 1048  Gross per 24 hour   Intake 120 ml   Output 900 ml   Net -780 ml           Physical Exam  Physical Exam  Constitutional:       General: She is not in acute distress.     Appearance: She is well-developed. She is not diaphoretic.   HENT:      Head: Normocephalic and atraumatic.      Nose: Nose normal.   Eyes:      General:         Right eye: No discharge.         Left eye: No discharge.      Conjunctiva/sclera: Conjunctivae normal.      Pupils: Pupils are equal, round, and reactive to light.   Neck:      Thyroid: No thyromegaly.      Vascular: No JVD.      Trachea: No tracheal deviation.   Cardiovascular:      Rate and Rhythm: Normal rate and regular rhythm.      Heart sounds: Normal heart sounds. No murmur heard.     No friction rub. No gallop.   Pulmonary:      Effort: Pulmonary effort is normal. No respiratory distress.      Breath sounds: Normal breath sounds. No stridor. No wheezing or rales.   Chest:      Chest wall: No tenderness.   Abdominal:      General: Bowel sounds are normal. There is no distension.      Palpations: Abdomen is soft. There is no mass.      Tenderness: There is no abdominal tenderness. There is no guarding or rebound.   Musculoskeletal:         General: No tenderness or deformity. Normal range of motion.      Cervical back: Normal range of motion and neck supple.   Lymphadenopathy:      Cervical: No cervical adenopathy.   Skin:     General: Skin is warm and dry.      Coloration: Skin is not pale.      Findings: No erythema or rash.   Neurological:      Mental Status: She is  alert and oriented to person, place, and time.      Cranial Nerves: No cranial nerve deficit.      Motor: No abnormal muscle tone.      Coordination: Coordination normal.      Deep Tendon Reflexes: Reflexes normal.   Psychiatric:         Behavior: Behavior normal.         Thought Content: Thought content normal.         Judgment: Judgment normal.             Results Review:      Results from last 7 days   Lab Units 11/04/23  0800 11/02/23 2328 11/02/23  0005 11/01/23  0937 10/31/23  0844   SODIUM mmol/L 137 136 136 136 144   CO2 mmol/L 29.0 29.0 29.0 31.0* 34.0*   BUN mg/dL 38* 52* 57* 65* 63*   CREATININE mg/dL 1.03* 0.93 0.94 0.99 1.06*   CALCIUM mg/dL 9.6 9.2 9.0 9.1 9.3   ALBUMIN g/dL 3.4* 3.4* 3.4* 3.2* 3.3*   AST (SGOT) U/L 17 18 24 18 20   ALT (SGPT) U/L 12 15 13 12 11   EGFR mL/min/1.73 54.4* 61.5 60.7 57.0* 52.6*       Results from last 7 days   Lab Units 11/04/23  0800 11/02/23 2328 11/02/23  0005 11/01/23  1425 10/31/23  0844 10/29/23  2331 10/29/23  0800   WBC 10*3/mm3 33.60* 43.90* 41.80* 37.60* 39.00* 59.40* 65.20*       Imaging studies: I personally reviewed the patient's most recent pertinent imaging studies       Medication Review:   amLODIPine, 10 mg, Nasogastric, Q24H  Barium Sulfate, 1 teaspoon(s), Oral, Once in imaging  chlorthalidone, 25 mg, Nasogastric, Daily  enoxaparin, 40 mg, Subcutaneous, Daily  Ferrous Sulfate, 300 mg, Nasogastric, Once per day on Mon Wed Fri  insulin lispro, 4-24 Units, Subcutaneous, Q6H  lansoprazole, 30 mg, Nasogastric, BID  levothyroxine, 100 mcg, Nasogastric, Q AM  losartan, 25 mg, Oral, Q24H  metoclopramide, 5 mg, Nasogastric, Q8H  rOPINIRole, 0.25 mg, Nasogastric, Nightly  rosuvastatin, 10 mg, Nasogastric, Nightly  senna-docusate sodium, 2 tablet, Nasogastric, BID  sertraline, 150 mg, Nasogastric, Daily  sodium chloride, 10 mL, Intravenous, Q12H  sodium chloride, 10 mL, Intravenous, Q12H  sucralfate, 1 g, Nasogastric, 4x Daily AC & at  Bedtime          Assessment  Ishmael secondary to     S/p fall    Septic shock   UTI secondary to Ecoli and Kliebsella Bacteremia     DM      HTN     Hist of CHF   Ef 70%        Plan:  Vitals stable Pt on 4L Oxygen   No shortness of breath at rest  Renal function stable Creat 1.0 pot 4.3   Ok to cont ARB   Prn straight cath for urinary retention       Lester Lorenzo MD  11/04/23  14:05 EDT  Tel: [phone]  Fax: [phone]

## 2023-11-04 NOTE — PROGRESS NOTES
Nutrition Services    Patient Name:  Diane Guan  YOB: 1940  MRN: [id number]  Admit Date:  10/10/2023    Brief progress note to check on intakes. Pt has eating 75% x 2 meals today so far. Selections reviewed, and pt ordering complete meals. Will continue to follow along. At this point, resuming EN is not indicated.     Electronically signed by:  Kita Chino RD  11/04/23 15:47 EDT

## 2023-11-05 ENCOUNTER — APPOINTMENT (OUTPATIENT)
Dept: GENERAL RADIOLOGY | Facility: HOSPITAL | Age: 83
DRG: 207 | End: 2023-11-05
Payer: MEDICARE

## 2023-11-05 LAB
ALBUMIN SERPL-MCNC: 3.2 G/DL (ref 3.5–5.2)
ALBUMIN/GLOB SERPL: 1 G/DL
ALP SERPL-CCNC: 147 U/L (ref 39–117)
ALT SERPL W P-5'-P-CCNC: 13 U/L (ref 1–33)
ANION GAP SERPL CALCULATED.3IONS-SCNC: 11 MMOL/L (ref 5–15)
AST SERPL-CCNC: 18 U/L (ref 1–32)
BILIRUB SERPL-MCNC: 0.2 MG/DL (ref 0–1.2)
BUN SERPL-MCNC: 37 MG/DL (ref 8–23)
BUN/CREAT SERPL: 35.6 (ref 7–25)
CALCIUM SPEC-SCNC: 9.1 MG/DL (ref 8.6–10.5)
CHLORIDE SERPL-SCNC: 96 MMOL/L (ref 98–107)
CO2 SERPL-SCNC: 26 MMOL/L (ref 22–29)
CREAT SERPL-MCNC: 1.04 MG/DL (ref 0.57–1)
DEPRECATED RDW RBC AUTO: 47.7 FL (ref 37–54)
EGFRCR SERPLBLD CKD-EPI 2021: 53.8 ML/MIN/1.73
EOSINOPHIL # BLD MANUAL: 1.23 10*3/MM3 (ref 0–0.4)
EOSINOPHIL NFR BLD MANUAL: 3 % (ref 0.3–6.2)
ERYTHROCYTE [DISTWIDTH] IN BLOOD BY AUTOMATED COUNT: 14.8 % (ref 12.3–15.4)
GLOBULIN UR ELPH-MCNC: 3.2 GM/DL
GLUCOSE BLDC GLUCOMTR-MCNC: 105 MG/DL (ref 70–105)
GLUCOSE BLDC GLUCOMTR-MCNC: 118 MG/DL (ref 70–105)
GLUCOSE BLDC GLUCOMTR-MCNC: 124 MG/DL (ref 70–105)
GLUCOSE BLDC GLUCOMTR-MCNC: 135 MG/DL (ref 70–105)
GLUCOSE BLDC GLUCOMTR-MCNC: 171 MG/DL (ref 70–105)
GLUCOSE SERPL-MCNC: 101 MG/DL (ref 65–99)
HCT VFR BLD AUTO: 26.9 % (ref 34–46.6)
HGB BLD-MCNC: 8.2 G/DL (ref 12–15.9)
LARGE PLATELETS: ABNORMAL
LYMPHOCYTES # BLD MANUAL: 32.8 10*3/MM3 (ref 0.7–3.1)
LYMPHOCYTES NFR BLD MANUAL: 2 % (ref 5–12)
MAGNESIUM SERPL-MCNC: 2.5 MG/DL (ref 1.6–2.4)
MCH RBC QN AUTO: 26.8 PG (ref 26.6–33)
MCHC RBC AUTO-ENTMCNC: 30.7 G/DL (ref 31.5–35.7)
MCV RBC AUTO: 87.6 FL (ref 79–97)
MONOCYTES # BLD: 0.82 10*3/MM3 (ref 0.1–0.9)
NEUTROPHILS # BLD AUTO: 6.15 10*3/MM3 (ref 1.7–7)
NEUTROPHILS NFR BLD MANUAL: 15 % (ref 42.7–76)
PLATELET # BLD AUTO: 396 10*3/MM3 (ref 140–450)
PMV BLD AUTO: 9.4 FL (ref 6–12)
POIKILOCYTOSIS BLD QL SMEAR: ABNORMAL
POTASSIUM SERPL-SCNC: 4.4 MMOL/L (ref 3.5–5.2)
PROT SERPL-MCNC: 6.4 G/DL (ref 6–8.5)
RBC # BLD AUTO: 3.07 10*6/MM3 (ref 3.77–5.28)
SCAN SLIDE: NORMAL
SMUDGE CELLS BLD QL SMEAR: ABNORMAL
SODIUM SERPL-SCNC: 133 MMOL/L (ref 136–145)
VARIANT LYMPHS NFR BLD MANUAL: 80 % (ref 19.6–45.3)
WBC NRBC COR # BLD: 41 10*3/MM3 (ref 3.4–10.8)

## 2023-11-05 PROCEDURE — 25010000002 ENOXAPARIN PER 10 MG: Performed by: INTERNAL MEDICINE

## 2023-11-05 PROCEDURE — 94618 PULMONARY STRESS TESTING: CPT

## 2023-11-05 PROCEDURE — 63710000001 INSULIN LISPRO (HUMAN) PER 5 UNITS: Performed by: INTERNAL MEDICINE

## 2023-11-05 PROCEDURE — 82948 REAGENT STRIP/BLOOD GLUCOSE: CPT

## 2023-11-05 PROCEDURE — 71045 X-RAY EXAM CHEST 1 VIEW: CPT

## 2023-11-05 RX ORDER — LOSARTAN POTASSIUM 25 MG/1
25 TABLET ORAL
Qty: 30 TABLET | Refills: 0 | Status: CANCELLED | OUTPATIENT
Start: 2023-11-06 | End: 2023-12-06

## 2023-11-05 RX ORDER — TAMSULOSIN HYDROCHLORIDE 0.4 MG/1
0.4 CAPSULE ORAL DAILY
Status: DISCONTINUED | OUTPATIENT
Start: 2023-11-05 | End: 2023-11-06 | Stop reason: HOSPADM

## 2023-11-05 RX ADMIN — Medication 10 ML: at 21:25

## 2023-11-05 RX ADMIN — OXYCODONE HYDROCHLORIDE 5 MG: 5 TABLET ORAL at 10:24

## 2023-11-05 RX ADMIN — ANASTROZOLE 1 MG: 1 TABLET ORAL at 08:43

## 2023-11-05 RX ADMIN — AMLODIPINE BESYLATE 10 MG: 5 TABLET ORAL at 08:45

## 2023-11-05 RX ADMIN — ACETAMINOPHEN 650 MG: 325 TABLET, FILM COATED ORAL at 13:00

## 2023-11-05 RX ADMIN — OXYCODONE HYDROCHLORIDE 5 MG: 5 TABLET ORAL at 03:13

## 2023-11-05 RX ADMIN — SUCRALFATE 1 G: 1 TABLET ORAL at 21:25

## 2023-11-05 RX ADMIN — LANSOPRAZOLE 30 MG: 30 TABLET, ORALLY DISINTEGRATING ORAL at 08:44

## 2023-11-05 RX ADMIN — SENNOSIDES AND DOCUSATE SODIUM 2 TABLET: 8.6; 5 TABLET ORAL at 21:25

## 2023-11-05 RX ADMIN — INSULIN LISPRO 4 UNITS: 100 INJECTION, SOLUTION INTRAVENOUS; SUBCUTANEOUS at 12:08

## 2023-11-05 RX ADMIN — CYCLOBENZAPRINE 10 MG: 10 TABLET, FILM COATED ORAL at 17:53

## 2023-11-05 RX ADMIN — SUCRALFATE 1 G: 1 TABLET ORAL at 12:08

## 2023-11-05 RX ADMIN — Medication 10 ML: at 08:45

## 2023-11-05 RX ADMIN — METOCLOPRAMIDE 5 MG: 5 TABLET ORAL at 10:24

## 2023-11-05 RX ADMIN — SERTRALINE 150 MG: 100 TABLET, FILM COATED ORAL at 08:45

## 2023-11-05 RX ADMIN — TAMSULOSIN HYDROCHLORIDE 0.4 MG: 0.4 CAPSULE ORAL at 17:53

## 2023-11-05 RX ADMIN — SUCRALFATE 1 G: 1 TABLET ORAL at 08:44

## 2023-11-05 RX ADMIN — METOCLOPRAMIDE 5 MG: 5 TABLET ORAL at 03:13

## 2023-11-05 RX ADMIN — ROSUVASTATIN 10 MG: 10 TABLET, FILM COATED ORAL at 21:25

## 2023-11-05 RX ADMIN — LOSARTAN POTASSIUM 25 MG: 25 TABLET, FILM COATED ORAL at 08:45

## 2023-11-05 RX ADMIN — METOCLOPRAMIDE 5 MG: 5 TABLET ORAL at 17:41

## 2023-11-05 RX ADMIN — ENOXAPARIN SODIUM 40 MG: 40 INJECTION, SOLUTION SUBCUTANEOUS at 15:55

## 2023-11-05 RX ADMIN — CHLORTHALIDONE 25 MG: 25 TABLET ORAL at 08:44

## 2023-11-05 RX ADMIN — OXYCODONE HYDROCHLORIDE 5 MG: 5 TABLET ORAL at 22:21

## 2023-11-05 RX ADMIN — LANSOPRAZOLE 30 MG: 30 TABLET, ORALLY DISINTEGRATING ORAL at 21:25

## 2023-11-05 RX ADMIN — SENNOSIDES AND DOCUSATE SODIUM 2 TABLET: 8.6; 5 TABLET ORAL at 08:44

## 2023-11-05 RX ADMIN — ROPINIROLE HYDROCHLORIDE 0.25 MG: 1 TABLET, FILM COATED ORAL at 21:25

## 2023-11-05 RX ADMIN — SUCRALFATE 1 G: 1 TABLET ORAL at 17:41

## 2023-11-05 RX ADMIN — LEVOTHYROXINE SODIUM 100 MCG: 0.1 TABLET ORAL at 06:09

## 2023-11-05 RX ADMIN — Medication 10 ML: at 21:40

## 2023-11-05 NOTE — PROGRESS NOTES
Exercise Oximetry    Patient Name:Diane Guan   MRN: [id number]   Date: 11/05/23             ROOM AIR BASELINE   SpO2% 84   Heart Rate    Blood Pressure      EXERCISE ON ROOM AIR SpO2% EXERCISE ON O2 @ 2 LPM SpO2%   1 MINUTE  1 MINUTE 84   2 MINUTES  2 MINUTES 86   3 MINUTES  3 MINUTES 87   4 MINUTES  4 MINUTES 88   5 MINUTES  5 MINUTES 88   6 MINUTES  6 MINUTES 91              Distance Walked   Distance Walked   Dyspnea (Chelsea Scale)   Dyspnea (Chelsea Scale)   Fatigue (Chelsea Scale)   Fatigue (Chelsea Scale)   SpO2% Post Exercise   SpO2% Post Exercise   HR Post Exercise   HR Post Exercise   Time to Recovery   Time to Recovery     Comments: Pt is unable to walk, spoke with daughter at bedside.  Pt will seemingly only be doing transfers to toilet.  Placed patient on 2L NC and she recovered nicely to 91%.  After a few minutes of rest pt SpO2 increased to 95% on 2L NC.    Leslie Man, CRT  11/5/2023  15:51 EST

## 2023-11-05 NOTE — CASE MANAGEMENT/SOCIAL WORK
Continued Stay Note  KELLY Bolton     Patient Name: Diane Guan  MRN: [id number]  Today's Date: 11/5/2023    Admit Date: 10/10/2023    Plan: Plan for home with daughter in [place] with [facility]; pending acceptance. HH order in place. Walking oximetry ordered. Agreeable to [facility].   Discharge Plan       Row Name 11/05/23 1646       Plan    Plan Plan for home with daughter in [place] with [facility]; pending acceptance. HH order in place. Walking oximetry ordered. Agreeable to [facility].    Patient/Family in Agreement with Plan yes    Post Acute Provider List Home Health;DME Supplier    Plan Comments Met with daughter at bedside. Daughter stated that the pt would stay with her other daughter in [place]. [address] [address]. They have WC, BSC, and hospital bed at home. Pt will require 02 at MI. Walk ordered. Family agreeable to [facility]. HH list provided. Dtr chose 1) [facility] 2) [facility]. Referral placed to [facility] and liaison notified. They plan to review tomorrow.

## 2023-11-05 NOTE — PROGRESS NOTES
[facility] Medicine [facility]   Daily Progress Note    Patient Name: Diane Guan  : 1940  MRN: [id number]  Primary Care Physician:  Asia Queen, SIOMARA  Date of admission: 10/10/2023    Subjective      Chief Complaint: Patient came in after a fall with right rib and right-sided back pain.     History of Present Illness: Diane Guan is a 83 y.o. female with past medical history of DM 2, hypertension and breast cancer who presented to [facility] on 10/10/2023 complaining of fall in the bathroom today with hitting her right side of the rib cage and right back with pain.  She says that her right leg has a little problem giving away at times and gave her while she was in the restroom today and she fell hitting her right side of the chest wall and right back on the commode.  She had pretty significant pain and that is what brought her here.  She says that she had some cough going on for about 3 weeks and was given a Z-Erick with which her cough is getting better.  She denies any fever chills shortness of breath nausea vomiting diarrhea constipation abdominal pain hematemesis hematochezia melena hemoptysis dysuria or hematuria.  Patient received a dose of Dilaudid in the ER and then her oxygen saturation dropped and she had to be started on 2 L of oxygen by nasal cannula.  I believe the Dilaudid caused some respiratory depression on top of her difficulty to take a deep breath anyways because of the pain secondary to fall has caused this acute respiratory failure.     10/29/2023 patient seen and examined in bed no acute distress, family at bedside, blood pressure 180/72.  Dyspnea on 15 L.  We were able to decrease oxygen to 8 L saturation remained 94%.  Discussed with RN.  Tolerating antibiotics.  Afebrile,  10/30/2023 patient seen and examined in bed  patient with significant dyspnea on 10 L, dysphagia, now with NG tube.  Discussed with RN.  Palliative care consulted.  Patient now  DNR.  10/31/23 patient seen and examined in bed acute distress, having significant dyspnea on 15 L of oxygen, vital signs stable, family at bedside, discussed with RN.  Creatinine 1.06  11/1/23 patient seen and examined in bed no acute distress family at bedside, dyspnea on 8 L.  Vital signs stable, patient complains of abdominal pain. now off tube feeding.  11/2/23 patient seen and examined in bed no acute distress, dyspnea on 6 L, still confused.  Discussed with RN.  To have a video swallow today.  11/3/2023 patient seen and examined in bed no acute distress, vital signs stable, dyspnea on 6 L.  Weakness and fatigue.  Discussed with RN.  Still confused.    11/04/23  Reviewed prior charts.  I evaluated the patient at bedside this morning.  Patient's daughter is at bedside.  Patient reports improvement in shortness of breath.  The mental status has improved.  She denies any new symptoms.    11/05/23  Patient was seen at bedside this morning.  Patient's daughter is at bedside.  Patient reports no new symptoms.       Objective      Vitals:   Temp:  [98 °F (36.7 °C)-98.5 °F (36.9 °C)] 98.2 °F (36.8 °C)  Heart Rate:  [73-74] 74  Resp:  [12-23] 13  BP: (112-143)/(47-63) 143/62  Flow (L/min):  [4-6] 4    Physical Exam  Constitutional:       Appearance: Normal appearance.   HENT:      Head: Normocephalic and atraumatic.      Ears:      Comments: Diminished hearing       Nose: Nose normal.      Mouth/Throat:      Mouth: Mucous membranes are moist.   Eyes:      Pupils: Pupils are equal, round, and reactive to light.   Cardiovascular:      Rate and Rhythm: Normal rate and regular rhythm.      Pulses: Normal pulses.      Heart sounds: Normal heart sounds.   Pulmonary:      Effort: Pulmonary effort is normal.      Breath sounds: Normal breath sounds.   Abdominal:      General: Bowel sounds are normal.      Palpations: Abdomen is soft.   Musculoskeletal:         General: Normal range of motion.   Skin:     General: Skin is warm.       Capillary Refill: Capillary refill takes less than 2 seconds.   Neurological:      General: No focal deficit present.      Mental Status: She is alert and oriented to person, place, and time.   Psychiatric:         Mood and Affect: Mood normal.            Result Review    Result Review:  I have personally reviewed the results from the time of this admission to 11/5/2023 09:11 EST and agree with these findings:  [x]  Laboratory  [x]  Microbiology  [x]  Radiology  []  EKG/Telemetry   []  Cardiology/Vascular   []  Pathology  [x]  Old records  []  Other:    Significant findings: WBC 33.6 (improved), hemoglobin 8.6, BUN 38, creatinine 1.03  Chest x-ray 11/4/2023  Stable bibasilar airspace opacities, which may be due to atelectasis and/or pneumonia with possible small pleural effusion.      CMP:        Lab 11/04/23 2333 11/04/23 0800 11/02/23 2328 11/02/23  0005 11/01/23  0937   SODIUM 133* 137 136 136 136   POTASSIUM 4.4 4.3 4.3 4.9 4.4   CHLORIDE 96* 99 97* 95* 95*   CO2 26.0 29.0 29.0 29.0 31.0*   ANION GAP 11.0 9.0 10.0 12.0 10.0   BUN 37* 38* 52* 57* 65*   CREATININE 1.04* 1.03* 0.93 0.94 0.99   EGFR 53.8* 54.4* 61.5 60.7 57.0*   GLUCOSE 101* 113* 120* 138* 98   CALCIUM 9.1 9.6 9.2 9.0 9.1   MAGNESIUM 2.5* 2.9* 3.2* 2.5* 2.5*   TOTAL PROTEIN 6.4 6.0 6.4 6.0 6.0   ALBUMIN 3.2* 3.4* 3.4* 3.4* 3.2*   GLOBULIN 3.2 2.6 3.0 2.6 2.8   ALT (SGPT) 13 12 15 13 12   AST (SGOT) 18 17 18 24 18   BILIRUBIN 0.2 0.2 0.2 0.2 0.2   ALK PHOS 147* 142* 143* 137* 134*    CBC:      Lab 11/04/23 2333 11/04/23  0800 11/02/23  2328 11/02/23  0005 11/01/23  1425   WBC 41.00* 33.60* 43.90* 41.80* 37.60*   HEMOGLOBIN 8.2* 8.6* 8.5* 8.4* 8.8*   HEMATOCRIT 26.9* 28.0* 27.0* 27.3* 29.1*   PLATELETS 396 398 490* 500* 483*   NEUTROS ABS 6.15 3.70 6.59 10.45* 1.88   EOS ABS 1.23* 1.01* 0.44* 1.67* 1.13*   MCV 87.6 87.0 87.0 85.3 87.3        Wounds (last 24 hours)               Assessment & Plan      Brief Patient Summary:  Diane Guan  is a 83 y.o. female with PMH of diabetes, hypertension, breast cancer presented to the hospital after a fall and was admitted with a principal diagnosis of Acute hypoxemic respiratory failure.  On admission, patient required 2 L nasal cannula and subsequently had worsening oxygenation with hypercapnic respiratory failure.  Failed BiPAP and subsequently was intubated on 10/15.  Initially treated with broad-spectrum antibiotics and ID was consulted.  RVP positive for rhinovirus, sputum cultures were negative.  Treated with diuretics for fluid overload.  Developed profound hypoxia and needed deep sedation.  Had bronch on 10/18.  Oxygenation improved with aggressive diuretics.  Sputum cultures grew Klebsiella and treated with meropenem.  Subsequently, extubated on 10/25 but developed hypoxia afterwards, failed BiPAP and had to be reintubated.  Also had LEXUS on admission, nephrology was consulted.  Renal ultrasound with no obstruction.  Treated with diuretics.  Subsequently, urine cultures grew both E. coli and Klebsiella pneumonia.  Treated with Zosyn for 7 days. During the hospital course, patient also developed elevated liver enzymes. Gallbladder ultrasound showed dilated common bile duct measuring 14 mm with distended gallbladder and sludge.  GI was consulted and recommended conservative management.  Liver enzymes subsequently normalized.  Subsequently, family decided on DNR/DNI prior to extubation on 10/27.  Did well with BiPAP.      amLODIPine, 10 mg, Nasogastric, Q24H  anastrozole, 1 mg, Oral, Daily  Barium Sulfate, 1 teaspoon(s), Oral, Once in imaging  chlorthalidone, 25 mg, Nasogastric, Daily  enoxaparin, 40 mg, Subcutaneous, Daily  Ferrous Sulfate, 300 mg, Nasogastric, Once per day on Mon Wed Fri  insulin lispro, 4-24 Units, Subcutaneous, Q6H  lansoprazole, 30 mg, Nasogastric, BID  levothyroxine, 100 mcg, Nasogastric, Q AM  losartan, 25 mg, Oral, Q24H  metoclopramide, 5 mg, Nasogastric, Q8H  rOPINIRole, 0.25 mg,  Nasogastric, Nightly  rosuvastatin, 10 mg, Nasogastric, Nightly  senna-docusate sodium, 2 tablet, Nasogastric, BID  sertraline, 150 mg, Nasogastric, Daily  sodium chloride, 10 mL, Intravenous, Q12H  sodium chloride, 10 mL, Intravenous, Q12H  sucralfate, 1 g, Nasogastric, 4x Daily AC & at Bedtime             Active Hospital Problems:  Active Hospital Problems    Diagnosis     **Acute hypoxemic respiratory failure     Respiratory failure     Hyperkalemia     Acute respiratory failure     History of breast cancer     Personal history of CLL (chronic lymphocytic leukemia)     History of cigarette smoking     Fall at home     Rhinovirus infection     CKD (chronic kidney disease) stage 3, GFR 30-59 ml/min     COPD (chronic obstructive pulmonary disease)     LIBBY (obstructive sleep apnea)     Vitamin D deficiency     Vitamin B 12 deficiency     Overactive bladder     Primary osteoarthritis involving multiple joints     Mixed hyperlipidemia     Mixed anxiety and depressive disorder     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     Insomnia     Hiatal hernia with gastroesophageal reflux     Type 2 diabetes mellitus with stage 3b chronic kidney disease, without long-term current use of insulin     Acquired hypothyroidism        # Acute respiratory failure with hypoxia and hypercapnia / ARDS: Due to hospital-acquired pneumonia and fluid overload with pulmonary edema.  Possible contribution from rhinovirus infection with resultant ARDS.  # COPD: Acute on chronic.  Not on any home medications.  Bronchodilators as needed.  Failed extubation and had to be reintubated on 10/25.  Likely due to a combination of COPD, sleep apnea, deconditioning and ARDS.  Respiratory viral panel negative  Respiratory cultures show heavy growth of Klebsiella sensitive to cefepime  Infectious is consulted.received 10 days of IV Merrem which ended on 10/31/2023   Extubated now off BiPAP on 10/27, improving.  Wean off oxygen as tolerated.     scheduled AVAPS during the nighttime and as needed during the daytime.  Walking oximetry done on 11/05/23 - Patient desaturated to 84% on room air, she will need O2 at 2lpm on discharge.      # Septic shock due to UTI / E. coli and Klebsiella bacteremia-Shock state has resolved.  RVP: +Rhinovirus  BAL cultures were negative.  Sputum cultures from 10/23 with Klebsiella.  ID following, Patient received 10 days of IV Merrem which ended on 10/31/2023      # Acute on chronic heart failure with preserved EF: Currently well compensated.  Last ECHO showed an EF of 70% with indeterminate LV diastolic function.   On intermittent diuretics, nephrology managing the diuretic regimen.  Monitor Input/Output very closely. Follow daily weights.     # Transaminitis with dilated common bile duct  # Coffee-ground Emesis - resolved  Gallbladder ultrasound with dilated CBD, measuring 14 mm with gall sludge.  GI following.  Liver enzymes normalized.  GI originally consulted for elevated LFTs and reconsulted for coffee-ground NG output.  Hemoglobin stable ~8.5-9  Patient not candidate for EGD due to respiratory status, EGD only if life-threatening GI bleed  Monitor Hb, continue PPI BID, carafate and low-dose reglan  Tolerating regular diet now     # Acute toxic / metabolic encephalopathy - Improved  CT head on 10/14 with no acute pathology.  Likely due to residual effects of deep sedation for multiple days.  Some ICU delirium as well.  Mentation improving slowly     # Acute kidney injury on CKD stage III - LEXUS resolved  Likely ATN from septic shock.  Nephrology following, on intermittent diuretics.  Monitor BMP  Avoid nephrotoxic meds    # Diabetes mellitus Type 2, not insulin-dependent : well controlled.   Hold home metformin.    Monitor Fingersticks AC/HS.   Sliding scale insulin  Last A1c of 6.5.     # Essential Hypertension: well controlled.   continue chlorthalidone.  Norvasc, Cozaar and irbesartan.    # Iron deficiency anemia: On  ferrous sulfate.  Some drop in hemoglobin with FOBT positive.  No gross bleeding, no need for emergent EGD/colonoscopy at this time.  Hb stable     # Primary hypothyroidism: Chronic and stable. Continue synthroid.    # Dyslipidemia: Chronic and stable.  Continue with statins.    # Breast cancer, s/p right mastectomy: Continue Anastrazole    # Chronic lymphocytic leukemia   - Oncology following  - This was first diagnosed at the end of 2022 and she has not needed treatment since the diagnosis. Her condition is complicated at this time by the severity of the present illness. The sudden and marked increase in the white cell count most likely does not represent worsening of the malignant disease but rather is likely connected to the present illness.   - Monitor CBC, WBC count improved.     # Falls at home/functional decline  - PT OT likely need nursing home discharge.    # Restless leg syndrome: Continue with Requip.    # Anxiety/depression: Continue Zoloft, Ativan PRN    # Severe obstructive sleep apnea:  - outpatient Sleep medicine follow up      DVT prophylaxis:  Medical and mechanical DVT prophylaxis orders are present.    CODE STATUS:    Medical Intervention Limits: NO intubation (DNI)  Code Status (Patient has no pulse and is not breathing): No CPR (Do Not Attempt to Resuscitate)  Medical Interventions (Patient has pulse or is breathing): Limited Support  Comments: Spoke with daughter and  at the bedside on 10/27/2023    Disposition: Patient will likely be discharged to rehab in 1-2 days.    Discussed with patient and her daughter at bedside.  Discussed with RN and .    Electronically signed by Christian Christina MD, 11/05/23, 09:11 EST.  [facility]

## 2023-11-05 NOTE — PLAN OF CARE
Goal Outcome Evaluation:           Progress: improving  Outcome Evaluation: Patient taking PO medications, on 4L high flow nasal cannula, and complained of pain 1 time relieved by PRN medication.

## 2023-11-05 NOTE — PROGRESS NOTES
Name: Diane Guan  Age: 83 y.o.  : 1940  Sex: female    23    Subjective   Pt admitted s/p fall     Interval History     No complaints     Objective:    Vital Signs  Temp:  [97.2 °F (36.2 °C)-98.3 °F (36.8 °C)] 97.2 °F (36.2 °C)  Heart Rate:  [73-88] 88  Resp:  [13-23] 18  BP: (102-143)/(40-62) 102/40        Intake/Output Summary (Last 24 hours) at 2023 1418  Last data filed at 2023 1000  Gross per 24 hour   Intake 600 ml   Output 2300 ml   Net -1700 ml           Physical Exam  Physical Exam  Constitutional:       General: She is not in acute distress.     Appearance: She is well-developed. She is not diaphoretic.   HENT:      Head: Normocephalic and atraumatic.      Nose: Nose normal.   Eyes:      General:         Right eye: No discharge.         Left eye: No discharge.      Conjunctiva/sclera: Conjunctivae normal.      Pupils: Pupils are equal, round, and reactive to light.   Neck:      Thyroid: No thyromegaly.      Vascular: No JVD.      Trachea: No tracheal deviation.   Cardiovascular:      Rate and Rhythm: Normal rate and regular rhythm.      Heart sounds: Normal heart sounds. No murmur heard.     No friction rub. No gallop.   Pulmonary:      Effort: Pulmonary effort is normal. No respiratory distress.      Breath sounds: Normal breath sounds. No stridor. No wheezing or rales.   Chest:      Chest wall: No tenderness.   Abdominal:      General: Bowel sounds are normal. There is no distension.      Palpations: Abdomen is soft. There is no mass.      Tenderness: There is no abdominal tenderness. There is no guarding or rebound.   Musculoskeletal:         General: No tenderness or deformity. Normal range of motion.      Cervical back: Normal range of motion and neck supple.   Lymphadenopathy:      Cervical: No cervical adenopathy.   Skin:     General: Skin is warm and dry.      Coloration: Skin is not pale.      Findings: No erythema or rash.   Neurological:      Mental Status: She is  alert and oriented to person, place, and time.      Cranial Nerves: No cranial nerve deficit.      Motor: No abnormal muscle tone.      Coordination: Coordination normal.      Deep Tendon Reflexes: Reflexes normal.   Psychiatric:         Behavior: Behavior normal.         Thought Content: Thought content normal.         Judgment: Judgment normal.             Results Review:      Results from last 7 days   Lab Units 11/04/23 2333 11/04/23  0800 11/02/23 2328 11/02/23  0005 11/01/23  0937   SODIUM mmol/L 133* 137 136 136 136   CO2 mmol/L 26.0 29.0 29.0 29.0 31.0*   BUN mg/dL 37* 38* 52* 57* 65*   CREATININE mg/dL 1.04* 1.03* 0.93 0.94 0.99   CALCIUM mg/dL 9.1 9.6 9.2 9.0 9.1   ALBUMIN g/dL 3.2* 3.4* 3.4* 3.4* 3.2*   AST (SGOT) U/L 18 17 18 24 18   ALT (SGPT) U/L 13 12 15 13 12   EGFR mL/min/1.73 53.8* 54.4* 61.5 60.7 57.0*       Results from last 7 days   Lab Units 11/04/23  2333 11/04/23  0800 11/02/23 2328 11/02/23  0005 11/01/23  1425 10/31/23  0844 10/29/23  2331   WBC 10*3/mm3 41.00* 33.60* 43.90* 41.80* 37.60* 39.00* 59.40*       Imaging studies: I personally reviewed the patient's most recent pertinent imaging studies       Medication Review:   amLODIPine, 10 mg, Nasogastric, Q24H  anastrozole, 1 mg, Oral, Daily  Barium Sulfate, 1 teaspoon(s), Oral, Once in imaging  chlorthalidone, 25 mg, Nasogastric, Daily  enoxaparin, 40 mg, Subcutaneous, Daily  Ferrous Sulfate, 300 mg, Nasogastric, Once per day on Mon Wed Fri  insulin lispro, 4-24 Units, Subcutaneous, Q6H  lansoprazole, 30 mg, Nasogastric, BID  levothyroxine, 100 mcg, Nasogastric, Q AM  losartan, 25 mg, Oral, Q24H  metoclopramide, 5 mg, Nasogastric, Q8H  rOPINIRole, 0.25 mg, Nasogastric, Nightly  rosuvastatin, 10 mg, Nasogastric, Nightly  senna-docusate sodium, 2 tablet, Nasogastric, BID  sertraline, 150 mg, Nasogastric, Daily  sodium chloride, 10 mL, Intravenous, Q12H  sodium chloride, 10 mL, Intravenous, Q12H  sucralfate, 1 g, Nasogastric, 4x Daily AC &  at Bedtime          Assessment  Ishmael secondary to sepsis and decreased volume     S/p fall    Septic shock   UTI secondary to Ecoli and Kliebsella Bacteremia     DM      HTN     Hist of CHF   Ef 70%        Plan:  Vitals stable Pt on 4L Oxygen   No shortness of breath at rest  Renal function stable Creat 1.0 pot 4.4   Ok to cont ARB   Prn straight cath for urinary retention ..start Flomax      Lester Lorenzo MD  11/05/23  14:18 EST  Tel: [phone]  Fax: [phone]

## 2023-11-05 NOTE — PLAN OF CARE
Goal Outcome Evaluation:   Pt is currently on 2 L nasal canula. Possible discharge tomorrow. Continue to monitor

## 2023-11-05 NOTE — DISCHARGE PLACEMENT REQUEST
"Dinae Apple (83 y.o. Female)       Date of Birth   1940    Social Security Number       Address   [address] [place] [address] [address] [place] [address]    Home Phone       MRN   [id number]       Faith   Non-Restoration    Marital Status                               Admission Date   10/10/23    Admission Type   Emergency    Admitting Provider   Kalyan Mckeon MD    Attending Provider   Christian Christina MD    Department, Room/Bed   [facility],        Discharge Date       Discharge Disposition       Discharge Destination                                 Attending Provider: Christian Christina MD    Allergies: No Known Allergies    Isolation: None   Infection: None   Code Status: No CPR    Ht: 160 cm (63\")   Wt: 79.3 kg (174 lb 13.2 oz)    Admission Cmt: None   Principal Problem: Acute hypoxemic respiratory failure [J96.01]                   Active Insurance as of 10/10/2023       Primary Coverage       Payor Plan Insurance Group Employer/Plan Group    HUMANA MEDICARE REPLACEMENT HUMANA MEDICARE REPLACEMENT [id number]       Payor Plan Address Payor Plan Phone Number Payor Plan Fax Number Effective Dates    [address] [address] [phone]  2021 - None Entered    [facility] [phone]         Subscriber Name Subscriber Birth Date Member ID       DIANE APPLE 1940 [id number]                     Emergency Contacts        (Rel.) Home Phone Work Phone Mobile Phone    KOFI APPLE (Spouse) -- -- [phone]    CHERYL SLAUGHTER (Daughter) [phone] -- --    noemyjoanna (Sister) -- -- [phone]                 History & Physical        Kalyan Mckeon MD at 10/10/23 1900              [facility] Medicine Services  History & Physical    Patient Name: Diane Apple  : 1940  MRN: [id number]  Primary Care Physician:  Asia Queen, APRN  Date of admission: 10/10/2023  Date and Time of Service: 10/10/2023 at 1900.    Subjective      Chief " Complaint: Patient came in after a fall with right rib and right-sided back pain.    History of Present Illness: Diane Guan is a 83 y.o. female with past medical history of DM 2, hypertension and breast cancer who presented to [facility] on 10/10/2023 complaining of fall in the bathroom today with hitting her right side of the rib cage and right back with pain.  She says that her right leg has a little problem giving away at times and gave her while she was in the restroom today and she fell hitting her right side of the chest wall and right back on the commode.  She had pretty significant pain and that is what brought her here.  She says that she had some cough going on for about 3 weeks and was given a Z-Erick with which her cough is getting better.  She denies any fever chills shortness of breath nausea vomiting diarrhea constipation abdominal pain hematemesis hematochezia melena hemoptysis dysuria or hematuria.  Patient received a dose of Dilaudid in the ER and then her oxygen saturation dropped and she had to be started on 2 L of oxygen by nasal cannula.  I believe the Dilaudid caused some respiratory depression on top of her difficulty to take a deep breath anyways because of the pain secondary to fall has caused this acute respiratory failure.      Review of Systems   Constitutional: Negative for chills, fever and night sweats.   HENT:  Positive for congestion. Negative for hoarse voice and sore throat.    Eyes:  Negative for blurred vision and double vision.   Cardiovascular:  Positive for chest pain. Negative for dyspnea on exertion, leg swelling, near-syncope, orthopnea, palpitations and syncope.   Respiratory:  Positive for cough. Negative for hemoptysis, shortness of breath, sputum production and wheezing.    Endocrine: Negative for cold intolerance and heat intolerance.   Skin:  Negative for itching and rash.   Musculoskeletal:  Positive for back pain and myalgias. Negative for neck pain  and stiffness.        Patient had a fall today and last fall was about a year ago.   Gastrointestinal:  Negative for abdominal pain, constipation, diarrhea, hematemesis, hematochezia, melena, nausea and vomiting.   Genitourinary:  Negative for dysuria, frequency, hematuria and urgency.   Neurological:  Negative for excessive daytime sleepiness, dizziness, focal weakness, headaches, light-headedness, loss of balance and weakness.   Psychiatric/Behavioral:  Negative for altered mental status, depression and hallucinations.           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    Depression     Disease of thyroid gland     Diverticulosis of colon 2018    Identified on colonoscopy    Dysphagia 12/2020    Dyspnea on exertion     Hemorrhoid     Hiatal hernia 12/2020    Hiatal hernia with GERD     large hiatal hernia with high-grade reflux on barium swallow; s/p laparoscopic fundoplication with gastropexy    History of diabetes mellitus     no meds now    Hyperlipidemia     Hypertension     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    aspiration pneumonia; completed abx therapy    OAB (overactive bladder)     Skin cancer     Sleep apnea     daughter states pt does not have and doesn't have machine       Past Surgical History:   Procedure Laterality Date    BLADDER SURGERY      BREAST BIOPSY      BRONCHOSCOPY N/A 09/13/2019    Procedure: BRONCHOSCOPY with bronchial washing;  Surgeon: Marnie Frankel MD;  Location: [facility] ENDOSCOPY;  Service: Pulmonary    COLONOSCOPY  07/19/2018    severe diverticulosis    CYST REMOVAL      Removed a fatty cyst off of her back    ENDOSCOPY N/A 11/23/2020    Procedure: ESOPHAGOGASTRODUODENOSCOPY with dilatation (Bougie # 48, 50, 52, 54, 56, 58);  Surgeon: Den Plummer DO;  Location:  [facility] ENDOSCOPY;  Service: General;  Laterality: N/A;  Post: large hiatal hernia, joshua's ulcers    HIATAL HERNIA REPAIR N/A 12/30/2020    Procedure: Laparoscopic hiatal hernia repair with gastropexy;  Surgeon: Den Plummer DO;  Location: [facility] MAIN OR;  Service: General;  Laterality: N/A;    MASTECTOMY Right 02/04/2019    Invasive ductal carcinoma    TUBAL ABDOMINAL LIGATION         Family History: family history includes Arthritis in an other family member; Diabetes in her mother; Heart disease in an other family member. Otherwise pertinent FHx was reviewed and not pertinent to current issue.    Social History:  reports that she quit smoking about 31 years ago. Her smoking use included cigarettes. She started smoking about 33 years ago. She has a 1.00 pack-year smoking history. She has never used smokeless tobacco. She reports that she does not drink alcohol and does not use drugs.    Home Medications:  Prior to Admission Medications       Prescriptions Last Dose Informant Patient Reported? Taking?    Accu-Chek Softclix Lancets lancets   No No    1 each by Other route Daily. Use as instructed    Alcohol Swabs ([employer] Alcohol Prep Pads) 70 % pads   Yes No    APPLY 1 EACH TOPICALLY DAILY.    amLODIPine (NORVASC) 10 MG tablet   No No    Take 1 tablet by mouth Daily.    anastrozole (ARIMIDEX) 1 MG tablet   No No    Take 1 tablet by mouth Daily.    budesonide-formoterol (Symbicort) 80-4.5 MCG/ACT inhaler   No No    Inhale 2 puffs 2 (Two) Times a Day.    Patient not taking:  Reported on 9/15/2023    cetirizine (zyrTEC) 10 MG tablet   Yes No    Take 1 tablet by mouth every night at bedtime.    chlorthalidone (HYGROTON) 25 MG tablet   No No    TAKE 1 TABLET BY MOUTH EVERY DAY    Cyanocobalamin (HM Super Vitamin B12) 2500 MCG chewable tablet   Yes No    Chew.    Patient not taking:  Reported on 9/15/2023    docusate sodium (COLACE) 100 MG capsule   Yes No    Take 1 capsule by mouth 2 (Two) Times a Day.    ferrous  sulfate 324 (65 Fe) MG tablet delayed-release EC tablet   Yes No    Take 1 tablet by mouth 3 (Three) Times a Week.    glucose blood (Accu-Chek Guide) test strip   No No    1 each by Other route Daily. Use as instructed    Hearing Aid Batteries misc   No No    1 Units Every 7 (Seven) Days.    ipratropium-albuterol (DUO-NEB) 0.5-2.5 mg/3 ml nebulizer   No No    Take 3 mL by nebulization Every 6 (Six) Hours As Needed for Wheezing or Shortness of Air.    Patient not taking:  Reported on 9/15/2023    irbesartan (AVAPRO) 300 MG tablet   No No    TAKE 1 TABLET BY MOUTH EVERY DAY    Isopropyl Alcohol (Alcohol Wipes) 70 % misc   No No    Apply 1 each topically Daily.    levothyroxine (SYNTHROID, LEVOTHROID) 100 MCG tablet   No No    One tab po q day    LORazepam (ATIVAN) 0.5 MG tablet   No No    Take 1 tablet by mouth Every 8 (Eight) Hours As Needed for Anxiety.    Patient not taking:  Reported on 9/15/2023    metFORMIN (GLUCOPHAGE) 500 MG tablet   No No    Take 1 tablet by mouth Daily With Breakfast.    metoclopramide (REGLAN) 5 MG tablet   No No    Take 1 tablet by mouth 3 (Three) Times a Day As Needed (for nausea and vomiting).    mirtazapine (REMERON) 15 MG tablet   No No    Take 1 tablet by mouth every night at bedtime.    oxyCODONE (ROXICODONE) 5 MG immediate release tablet   Yes No    Take  by mouth See Admin Instructions. Take 1 tablet by mouth every 4-6 hours as needed for pain    Patient not taking:  Reported on 9/15/2023    pantoprazole (PROTONIX) 40 MG EC tablet   No No    TAKE 1 TABLET BY MOUTH EVERY MORNING BEFORE BREAKFAST. TAKE ON AN EMPTY STOMACH!    rOPINIRole (REQUIP) 0.25 MG tablet   No No    Take 1 tablet by mouth Every Night. Take 1 hour before bedtime.    rosuvastatin (CRESTOR) 10 MG tablet   No No    Take 1 tablet by mouth Every Night.    sertraline (ZOLOFT) 100 MG tablet   No No    Take 1.5 tablets by mouth Daily.              Allergies:  No Known Allergies    Objective      Vitals:   Temp:  [97.8 °F  (36.6 °C)] 97.8 °F (36.6 °C)  Heart Rate:  [71-80] 71  Resp:  [18-20] 18  BP: (159-175)/(69-74) 175/74  Flow (L/min):  [2] 2    Physical Exam  Constitutional:       General: She is not in acute distress.  HENT:      Head: Normocephalic and atraumatic.      Mouth/Throat:      Mouth: Mucous membranes are moist.      Pharynx: Oropharynx is clear.   Cardiovascular:      Rate and Rhythm: Normal rate and regular rhythm.      Pulses: Normal pulses.      Heart sounds: Normal heart sounds.   Pulmonary:      Effort: Pulmonary effort is normal.      Comments: Patient has scattered bilateral rhonchi.  Abdominal:      Palpations: Abdomen is soft.      Tenderness: There is no abdominal tenderness.   Musculoskeletal:      Cervical back: Normal range of motion.      Right lower leg: No edema.      Left lower leg: No edema.      Comments: Patient has significant right-sided chest wall tenderness   Skin:     General: Skin is warm and dry.   Neurological:      General: No focal deficit present.      Mental Status: She is alert.   Psychiatric:         Mood and Affect: Mood normal.         Behavior: Behavior normal.          Result Review    Result Review:  I have personally reviewed the results from the time of this admission to 10/10/2023 19:00 EDT and agree with these findings:  [x]  Laboratory  []  Microbiology  [x]  Radiology  []  EKG/Telemetry   []  Cardiology/Vascular   []  Pathology  []  Old records  []  Other:  Most notable findings include:   CBC shows WBC of 30.6, H&H of 11.7 and 38.1 platelets of 182.  CMP shows sodium of 142, potassium 5.2, chloride 110, carbon dioxide 20, BUN 37, creatinine 1.32 and glucose of 125.  LFTs were normal except for alkaline phosphatase of 125.  ABGs on room air were 7.2 9/38/52.8/83.5%.  Respiratory panel showed human rhinovirus enterovirus positivity.  Chest x-ray showed no acute abnormality.      Assessment & Plan        Active Hospital Problems:  Active Hospital Problems    Diagnosis      **Acute respiratory failure      Plan:   83-year-old white female with a past medical history of hypertension, diabetes mellitus type 2 and breast cancer status postmastectomy comes in after a fall with right-sided chest pain and right upper back pain but negative chest x-ray, received Dilaudid in the ER and then went into mild acute respiratory failure.    #Acute respiratory failure:  Most likely secondary to some respiratory depression secondary to Dilaudid on top of difficulty breathing secondary to chest pain due to fall.  No more IV pain medications for her.  We will try to wean off oxygen.  If patient gets off the oxygen, hopefully home tomorrow.    #Fall with right-sided chest pain:  X-ray without any fractures.  Use Tylenol and oxycodone for pain control.    #DM2:  Diabetic diet.  Accu-Cheks and sliding scale insulin.    #Hypertension:  Continue home blood pressure medications.    #Breast cancer:  Stable at this point.  Continue home medications.    Patient is full code.    I have utilized all available immediate resources to obtain, update, or review the patient's current medications.   I confirmed that the patient's Advance Care Plan is present, code status is documented, or surrogate decision maker is listed in the patient's medical record.       DVT prophylaxis:  Medical DVT prophylaxis orders are present.    CODE STATUS:    Level Of Support Discussed With: Patient  Code Status (Patient has no pulse and is not breathing): CPR (Attempt to Resuscitate)  Medical Interventions (Patient has pulse or is breathing): Full Support    Admission Status:  I believe this patient meets observation status.    I discussed the patient's findings and my recommendations with patient.        Signature: Electronically signed by Kalyan Mckeon MD, 10/10/23, 19:00 EDT.  [facility] Hospitalist Team    Electronically signed by Kalyan Mckeon MD at 10/11/23 1016          Physician Progress Notes (last 24 hours)        [place]  MD Christian at 23 0911              [facility] Medicine Services   Daily Progress Note    Patient Name: Diane Guan  : 1940  MRN: [id number]  Primary Care Physician:  Asia Queen, SIOMARA  Date of admission: 10/10/2023    Subjective      Chief Complaint: Patient came in after a fall with right rib and right-sided back pain.     History of Present Illness: Diane Guan is a 83 y.o. female with past medical history of DM 2, hypertension and breast cancer who presented to [facility] on 10/10/2023 complaining of fall in the bathroom today with hitting her right side of the rib cage and right back with pain.  She says that her right leg has a little problem giving away at times and gave her while she was in the restroom today and she fell hitting her right side of the chest wall and right back on the commode.  She had pretty significant pain and that is what brought her here.  She says that she had some cough going on for about 3 weeks and was given a Z-Erick with which her cough is getting better.  She denies any fever chills shortness of breath nausea vomiting diarrhea constipation abdominal pain hematemesis hematochezia melena hemoptysis dysuria or hematuria.  Patient received a dose of Dilaudid in the ER and then her oxygen saturation dropped and she had to be started on 2 L of oxygen by nasal cannula.  I believe the Dilaudid caused some respiratory depression on top of her difficulty to take a deep breath anyways because of the pain secondary to fall has caused this acute respiratory failure.     10/29/2023 patient seen and examined in bed no acute distress, family at bedside, blood pressure 180/72.  Dyspnea on 15 L.  We were able to decrease oxygen to 8 L saturation remained 94%.  Discussed with RN.  Tolerating antibiotics.  Afebrile,  10/30/2023 patient seen and examined in bed  patient with significant dyspnea on 10 L, dysphagia, now with NG tube.  Discussed with RN.   Palliative care consulted.  Patient now DNR.  10/31/23 patient seen and examined in bed acute distress, having significant dyspnea on 15 L of oxygen, vital signs stable, family at bedside, discussed with RN.  Creatinine 1.06  11/1/23 patient seen and examined in bed no acute distress family at bedside, dyspnea on 8 L.  Vital signs stable, patient complains of abdominal pain. now off tube feeding.  11/2/23 patient seen and examined in bed no acute distress, dyspnea on 6 L, still confused.  Discussed with RN.  To have a video swallow today.  11/3/2023 patient seen and examined in bed no acute distress, vital signs stable, dyspnea on 6 L.  Weakness and fatigue.  Discussed with RN.  Still confused.    11/04/23  Reviewed prior charts.  I evaluated the patient at bedside this morning.  Patient's daughter is at bedside.  Patient reports improvement in shortness of breath.  The mental status has improved.  She denies any new symptoms.    11/05/23  Patient was seen at bedside this morning.  Patient's daughter is at bedside.  Patient reports no new symptoms.       Objective      Vitals:   Temp:  [98 °F (36.7 °C)-98.5 °F (36.9 °C)] 98.2 °F (36.8 °C)  Heart Rate:  [73-74] 74  Resp:  [12-23] 13  BP: (112-143)/(47-63) 143/62  Flow (L/min):  [4-6] 4    Physical Exam  Constitutional:       Appearance: Normal appearance.   HENT:      Head: Normocephalic and atraumatic.      Ears:      Comments: Diminished hearing       Nose: Nose normal.      Mouth/Throat:      Mouth: Mucous membranes are moist.   Eyes:      Pupils: Pupils are equal, round, and reactive to light.   Cardiovascular:      Rate and Rhythm: Normal rate and regular rhythm.      Pulses: Normal pulses.      Heart sounds: Normal heart sounds.   Pulmonary:      Effort: Pulmonary effort is normal.      Breath sounds: Normal breath sounds.   Abdominal:      General: Bowel sounds are normal.      Palpations: Abdomen is soft.   Musculoskeletal:         General: Normal range of  motion.   Skin:     General: Skin is warm.      Capillary Refill: Capillary refill takes less than 2 seconds.   Neurological:      General: No focal deficit present.      Mental Status: She is alert and oriented to person, place, and time.   Psychiatric:         Mood and Affect: Mood normal.            Result Review    Result Review:  I have personally reviewed the results from the time of this admission to 11/5/2023 09:11 EST and agree with these findings:  [x]  Laboratory  [x]  Microbiology  [x]  Radiology  []  EKG/Telemetry   []  Cardiology/Vascular   []  Pathology  [x]  Old records  []  Other:    Significant findings: WBC 33.6 (improved), hemoglobin 8.6, BUN 38, creatinine 1.03  Chest x-ray 11/4/2023  Stable bibasilar airspace opacities, which may be due to atelectasis and/or pneumonia with possible small pleural effusion.      CMP:        Lab 11/04/23 2333 11/04/23  0800 11/02/23  2328 11/02/23  0005 11/01/23  0937   SODIUM 133* 137 136 136 136   POTASSIUM 4.4 4.3 4.3 4.9 4.4   CHLORIDE 96* 99 97* 95* 95*   CO2 26.0 29.0 29.0 29.0 31.0*   ANION GAP 11.0 9.0 10.0 12.0 10.0   BUN 37* 38* 52* 57* 65*   CREATININE 1.04* 1.03* 0.93 0.94 0.99   EGFR 53.8* 54.4* 61.5 60.7 57.0*   GLUCOSE 101* 113* 120* 138* 98   CALCIUM 9.1 9.6 9.2 9.0 9.1   MAGNESIUM 2.5* 2.9* 3.2* 2.5* 2.5*   TOTAL PROTEIN 6.4 6.0 6.4 6.0 6.0   ALBUMIN 3.2* 3.4* 3.4* 3.4* 3.2*   GLOBULIN 3.2 2.6 3.0 2.6 2.8   ALT (SGPT) 13 12 15 13 12   AST (SGOT) 18 17 18 24 18   BILIRUBIN 0.2 0.2 0.2 0.2 0.2   ALK PHOS 147* 142* 143* 137* 134*    CBC:      Lab 11/04/23 2333 11/04/23  0800 11/02/23  2328 11/02/23  0005 11/01/23  1425   WBC 41.00* 33.60* 43.90* 41.80* 37.60*   HEMOGLOBIN 8.2* 8.6* 8.5* 8.4* 8.8*   HEMATOCRIT 26.9* 28.0* 27.0* 27.3* 29.1*   PLATELETS 396 398 490* 500* 483*   NEUTROS ABS 6.15 3.70 6.59 10.45* 1.88   EOS ABS 1.23* 1.01* 0.44* 1.67* 1.13*   MCV 87.6 87.0 87.0 85.3 87.3        Wounds (last 24 hours)               Assessment & Plan       Brief Patient Summary:  Diane Guan is a 83 y.o. female with PMH of diabetes, hypertension, breast cancer presented to the hospital after a fall and was admitted with a principal diagnosis of Acute hypoxemic respiratory failure.  On admission, patient required 2 L nasal cannula and subsequently had worsening oxygenation with hypercapnic respiratory failure.  Failed BiPAP and subsequently was intubated on 10/15.  Initially treated with broad-spectrum antibiotics and ID was consulted.  RVP positive for rhinovirus, sputum cultures were negative.  Treated with diuretics for fluid overload.  Developed profound hypoxia and needed deep sedation.  Had bronch on 10/18.  Oxygenation improved with aggressive diuretics.  Sputum cultures grew Klebsiella and treated with meropenem.  Subsequently, extubated on 10/25 but developed hypoxia afterwards, failed BiPAP and had to be reintubated.  Also had LEXUS on admission, nephrology was consulted.  Renal ultrasound with no obstruction.  Treated with diuretics.  Subsequently, urine cultures grew both E. coli and Klebsiella pneumonia.  Treated with Zosyn for 7 days. During the hospital course, patient also developed elevated liver enzymes. Gallbladder ultrasound showed dilated common bile duct measuring 14 mm with distended gallbladder and sludge.  GI was consulted and recommended conservative management.  Liver enzymes subsequently normalized.  Subsequently, family decided on DNR/DNI prior to extubation on 10/27.  Did well with BiPAP.      amLODIPine, 10 mg, Nasogastric, Q24H  anastrozole, 1 mg, Oral, Daily  Barium Sulfate, 1 teaspoon(s), Oral, Once in imaging  chlorthalidone, 25 mg, Nasogastric, Daily  enoxaparin, 40 mg, Subcutaneous, Daily  Ferrous Sulfate, 300 mg, Nasogastric, Once per day on Mon Wed Fri  insulin lispro, 4-24 Units, Subcutaneous, Q6H  lansoprazole, 30 mg, Nasogastric, BID  levothyroxine, 100 mcg, Nasogastric, Q AM  losartan, 25 mg, Oral, Q24H  metoclopramide,  5 mg, Nasogastric, Q8H  rOPINIRole, 0.25 mg, Nasogastric, Nightly  rosuvastatin, 10 mg, Nasogastric, Nightly  senna-docusate sodium, 2 tablet, Nasogastric, BID  sertraline, 150 mg, Nasogastric, Daily  sodium chloride, 10 mL, Intravenous, Q12H  sodium chloride, 10 mL, Intravenous, Q12H  sucralfate, 1 g, Nasogastric, 4x Daily AC & at Bedtime             Active Hospital Problems:  Active Hospital Problems    Diagnosis     **Acute hypoxemic respiratory failure     Respiratory failure     Hyperkalemia     Acute respiratory failure     History of breast cancer     Personal history of CLL (chronic lymphocytic leukemia)     History of cigarette smoking     Fall at home     Rhinovirus infection     CKD (chronic kidney disease) stage 3, GFR 30-59 ml/min     COPD (chronic obstructive pulmonary disease)     LIBBY (obstructive sleep apnea)     Vitamin D deficiency     Vitamin B 12 deficiency     Overactive bladder     Primary osteoarthritis involving multiple joints     Mixed hyperlipidemia     Mixed anxiety and depressive disorder     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     Insomnia     Hiatal hernia with gastroesophageal reflux     Type 2 diabetes mellitus with stage 3b chronic kidney disease, without long-term current use of insulin     Acquired hypothyroidism        # Acute respiratory failure with hypoxia and hypercapnia / ARDS: Due to hospital-acquired pneumonia and fluid overload with pulmonary edema.  Possible contribution from rhinovirus infection with resultant ARDS.  # COPD: Acute on chronic.  Not on any home medications.  Bronchodilators as needed.  Failed extubation and had to be reintubated on 10/25.  Likely due to a combination of COPD, sleep apnea, deconditioning and ARDS.  Respiratory viral panel negative  Respiratory cultures show heavy growth of Klebsiella sensitive to cefepime  Infectious is consulted.received 10 days of IV Merrem which ended on 10/31/2023   Extubated now off BiPAP on 10/27,  improving.  Wean off oxygen as tolerated.    scheduled AVAPS during the nighttime and as needed during the daytime.     # Septic shock due to UTI / E. coli and Klebsiella bacteremia-Shock state has resolved.  RVP: +Rhinovirus  BAL cultures were negative.  Sputum cultures from 10/23 with Klebsiella.  ID following, Patient received 10 days of IV Merrem which ended on 10/31/2023      # Acute on chronic heart failure with preserved EF: Currently well compensated.  Last ECHO showed an EF of 70% with indeterminate LV diastolic function.   On intermittent diuretics, nephrology managing the diuretic regimen.  Monitor Input/Output very closely. Follow daily weights.     # Transaminitis with dilated common bile duct  # Coffee-ground Emesis - resolved  Gallbladder ultrasound with dilated CBD, measuring 14 mm with gall sludge.  GI following.  Liver enzymes normalized.  GI originally consulted for elevated LFTs and reconsulted for coffee-ground NG output.  Hemoglobin stable ~8.5-9  Patient not candidate for EGD due to respiratory status, EGD only if life-threatening GI bleed  Monitor Hb, continue PPI BID, carafate and low-dose reglan  Tolerating regular diet now     # Acute toxic / metabolic encephalopathy - Improved  CT head on 10/14 with no acute pathology.  Likely due to residual effects of deep sedation for multiple days.  Some ICU delirium as well.  Mentation improving slowly     # Acute kidney injury on CKD stage III - LEXUS resolved  Likely ATN from septic shock.  Nephrology following, on intermittent diuretics.  Monitor BMP  Avoid nephrotoxic meds    # Diabetes mellitus Type 2, not insulin-dependent : well controlled.   Hold home metformin.    Monitor Fingersticks AC/HS.   Sliding scale insulin  Last A1c of 6.5.     # Essential Hypertension: well controlled.   continue chlorthalidone.  Norvasc, Cozaar and irbesartan.    # Iron deficiency anemia: On ferrous sulfate.  Some drop in hemoglobin with FOBT positive.  No gross  bleeding, no need for emergent EGD/colonoscopy at this time.  Hb stable     # Primary hypothyroidism: Chronic and stable. Continue synthroid.    # Dyslipidemia: Chronic and stable.  Continue with statins.    # Breast cancer, s/p right mastectomy: Continue Anastrazole    # Chronic lymphocytic leukemia   - Oncology following  - This was first diagnosed at the end of 2022 and she has not needed treatment since the diagnosis. Her condition is complicated at this time by the severity of the present illness. The sudden and marked increase in the white cell count most likely does not represent worsening of the malignant disease but rather is likely connected to the present illness.   - Monitor CBC, WBC count improved.     # Falls at home/functional decline  - PT OT likely need nursing home discharge.    # Restless leg syndrome: Continue with Requip.    # Anxiety/depression: Continue Zoloft, Ativan PRN    # Severe obstructive sleep apnea:  - outpatient Sleep medicine follow up      DVT prophylaxis:  Medical and mechanical DVT prophylaxis orders are present.    CODE STATUS:    Medical Intervention Limits: NO intubation (DNI)  Code Status (Patient has no pulse and is not breathing): No CPR (Do Not Attempt to Resuscitate)  Medical Interventions (Patient has pulse or is breathing): Limited Support  Comments: Spoke with daughter and  at the bedside on 10/27/2023    Disposition: Patient will likely be discharged to rehab in 1-2 days.    Discussed with patient and her daughter at bedside.  Discussed with RN and .    Electronically signed by Christian Christina MD, 11/05/23, 09:11 EST.  [facility] Hospitalist Team             Electronically signed by Christian Christina MD at 11/05/23 1209            [facility]  [address] [facility] [place] [address]  Phone:  [phone]  Fax:  [phone] Date: Nov 5, 2023      Ambulatory Referral to Home Health (Hospital)     Patient:  Diane Guan MRN:   [id number]   [address] [facility] [address]  [place] [address] :  1940  SSN:    Phone: [phone] Sex:  F      INSURANCE PAYOR PLAN GROUP # SUBSCRIBER ID   Primary:    [employer] MEDICARE REPLACEMENT [id number]      Referring Provider Information:  ALEXANDER CHRISTINA Phone: [phone] Fax: [phone]       Referral Information:   # Visits:  999 Referral Type: Home Health [42]   Urgency:  Routine Referral Reason: Specialty Services Required   Start Date: 2023 End Date:  To be determined by Insurer   Diagnosis: Weakness (R53.1 [ICD-10-CM] 780.79 [ICD-9-CM])      Refer to Dept:   Refer to Provider:   Refer to Provider Phone:   Refer to Facility:       Face to Face Visit Date: 2023  Follow-up provider for Plan of Care? I treated the patient in an acute care facility and will not continue treatment after discharge.  Follow-up provider: PABLO PARRISH [[id number]]  Reason/Clinical Findings: Weakness  Describe mobility limitations that make leaving home difficult: weakness  Nursing/Therapeutic Services Requested: Physical Therapy  Nursing/Therapeutic Services Requested: Occupational Therapy  PT orders: Home safety assessment  PT orders: Strengthening  PT orders: Transfer training  Occupational orders: Strengthening  Occupational orders: Energy conservation  Frequency: 1 Week 1     This document serves as a request of services and does not constitute Insurance authorization or approval of services.  To determine eligibility, please contact the members Insurance carrier to verify and review coverage.     If you have medical questions regarding this request for services. Please contact [facility] at [phone] during normal business hours.        Authorizing Provider:Alexander Christina MD  Authorizing Provider's NPI: [id number]  Order Entered By: Alexander Christina MD 2023  3:46 PM     Electronically signed by: Alexander Christina MD 2023  3:46 PM

## 2023-11-06 ENCOUNTER — APPOINTMENT (OUTPATIENT)
Dept: GENERAL RADIOLOGY | Facility: HOSPITAL | Age: 83
DRG: 207 | End: 2023-11-06
Payer: MEDICARE

## 2023-11-06 ENCOUNTER — READMISSION MANAGEMENT (OUTPATIENT)
Dept: CALL CENTER | Facility: HOSPITAL | Age: 83
End: 2023-11-06
Payer: MEDICARE

## 2023-11-06 ENCOUNTER — HOME HEALTH ADMISSION (OUTPATIENT)
Dept: HOME HEALTH SERVICES | Facility: HOME HEALTHCARE | Age: 83
End: 2023-11-06
Payer: MEDICARE

## 2023-11-06 ENCOUNTER — TRANSCRIBE ORDERS (OUTPATIENT)
Dept: HOME HEALTH SERVICES | Facility: HOME HEALTHCARE | Age: 83
End: 2023-11-06
Payer: MEDICARE

## 2023-11-06 ENCOUNTER — DOCUMENTATION (OUTPATIENT)
Dept: HOME HEALTH SERVICES | Facility: HOME HEALTHCARE | Age: 83
End: 2023-11-06
Payer: MEDICARE

## 2023-11-06 VITALS
SYSTOLIC BLOOD PRESSURE: 124 MMHG | DIASTOLIC BLOOD PRESSURE: 52 MMHG | OXYGEN SATURATION: 90 % | HEART RATE: 73 BPM | RESPIRATION RATE: 12 BRPM | TEMPERATURE: 98.7 F | BODY MASS INDEX: 28.67 KG/M2 | WEIGHT: 161.82 LBS | HEIGHT: 63 IN

## 2023-11-06 DIAGNOSIS — R06.00 DYSPNEA, UNSPECIFIED TYPE: Primary | ICD-10-CM

## 2023-11-06 LAB
ALBUMIN SERPL-MCNC: 3.2 G/DL (ref 3.5–5.2)
ALBUMIN/GLOB SERPL: 1 G/DL
ALP SERPL-CCNC: 153 U/L (ref 39–117)
ALT SERPL W P-5'-P-CCNC: 18 U/L (ref 1–33)
ANION GAP SERPL CALCULATED.3IONS-SCNC: 12 MMOL/L (ref 5–15)
AST SERPL-CCNC: 22 U/L (ref 1–32)
BILIRUB SERPL-MCNC: 0.2 MG/DL (ref 0–1.2)
BUN SERPL-MCNC: 32 MG/DL (ref 8–23)
BUN/CREAT SERPL: 32 (ref 7–25)
CALCIUM SPEC-SCNC: 9.1 MG/DL (ref 8.6–10.5)
CHLORIDE SERPL-SCNC: 94 MMOL/L (ref 98–107)
CO2 SERPL-SCNC: 26 MMOL/L (ref 22–29)
CREAT SERPL-MCNC: 1 MG/DL (ref 0.57–1)
DEPRECATED RDW RBC AUTO: 45.5 FL (ref 37–54)
EGFRCR SERPLBLD CKD-EPI 2021: 56.4 ML/MIN/1.73
EOSINOPHIL # BLD MANUAL: 0.64 10*3/MM3 (ref 0–0.4)
EOSINOPHIL NFR BLD MANUAL: 2 % (ref 0.3–6.2)
ERYTHROCYTE [DISTWIDTH] IN BLOOD BY AUTOMATED COUNT: 15 % (ref 12.3–15.4)
GLOBULIN UR ELPH-MCNC: 3.3 GM/DL
GLUCOSE BLDC GLUCOMTR-MCNC: 141 MG/DL (ref 70–105)
GLUCOSE SERPL-MCNC: 120 MG/DL (ref 65–99)
HCT VFR BLD AUTO: 27.3 % (ref 34–46.6)
HGB BLD-MCNC: 8.4 G/DL (ref 12–15.9)
IGA SERPL-MCNC: 175 MG/DL (ref 64–422)
IGE SERPL-ACNC: 2 IU/ML (ref 6–495)
IGG SERPL-MCNC: 504 MG/DL (ref 586–1602)
IGM SERPL-MCNC: 19 MG/DL (ref 26–217)
LARGE PLATELETS: ABNORMAL
LYMPHOCYTES # BLD MANUAL: 19.96 10*3/MM3 (ref 0.7–3.1)
MAGNESIUM SERPL-MCNC: 2.2 MG/DL (ref 1.6–2.4)
MCH RBC QN AUTO: 27.1 PG (ref 26.6–33)
MCHC RBC AUTO-ENTMCNC: 30.8 G/DL (ref 31.5–35.7)
MCV RBC AUTO: 87.8 FL (ref 79–97)
NEUTROPHILS # BLD AUTO: 11.59 10*3/MM3 (ref 1.7–7)
NEUTROPHILS NFR BLD MANUAL: 36 % (ref 42.7–76)
PLATELET # BLD AUTO: 343 10*3/MM3 (ref 140–450)
PMV BLD AUTO: 9.4 FL (ref 6–12)
POIKILOCYTOSIS BLD QL SMEAR: ABNORMAL
POTASSIUM SERPL-SCNC: 3.9 MMOL/L (ref 3.5–5.2)
PROT SERPL-MCNC: 6.5 G/DL (ref 6–8.5)
RBC # BLD AUTO: 3.11 10*6/MM3 (ref 3.77–5.28)
SCAN SLIDE: NORMAL
SMALL PLATELETS BLD QL SMEAR: ADEQUATE
SMUDGE CELLS BLD QL SMEAR: ABNORMAL
SODIUM SERPL-SCNC: 132 MMOL/L (ref 136–145)
VARIANT LYMPHS NFR BLD MANUAL: 62 % (ref 19.6–45.3)
WBC NRBC COR # BLD: 32.2 10*3/MM3 (ref 3.4–10.8)

## 2023-11-06 PROCEDURE — 71045 X-RAY EXAM CHEST 1 VIEW: CPT

## 2023-11-06 PROCEDURE — 82948 REAGENT STRIP/BLOOD GLUCOSE: CPT

## 2023-11-06 PROCEDURE — 80053 COMPREHEN METABOLIC PANEL: CPT | Performed by: STUDENT IN AN ORGANIZED HEALTH CARE EDUCATION/TRAINING PROGRAM

## 2023-11-06 PROCEDURE — 85025 COMPLETE CBC W/AUTO DIFF WBC: CPT | Performed by: NURSE PRACTITIONER

## 2023-11-06 PROCEDURE — 85007 BL SMEAR W/DIFF WBC COUNT: CPT | Performed by: NURSE PRACTITIONER

## 2023-11-06 PROCEDURE — 92611 MOTION FLUOROSCOPY/SWALLOW: CPT

## 2023-11-06 PROCEDURE — 83735 ASSAY OF MAGNESIUM: CPT | Performed by: STUDENT IN AN ORGANIZED HEALTH CARE EDUCATION/TRAINING PROGRAM

## 2023-11-06 PROCEDURE — 74230 X-RAY XM SWLNG FUNCJ C+: CPT

## 2023-11-06 RX ORDER — CYCLOBENZAPRINE HCL 10 MG
10 TABLET ORAL 3 TIMES DAILY PRN
Qty: 21 TABLET | Refills: 0 | Status: SHIPPED | OUTPATIENT
Start: 2023-11-06 | End: 2023-11-13

## 2023-11-06 RX ORDER — SUCRALFATE 1 G/1
1 TABLET ORAL
Qty: 120 TABLET | Refills: 0 | Status: SHIPPED | OUTPATIENT
Start: 2023-11-06 | End: 2023-12-06

## 2023-11-06 RX ORDER — OXYCODONE HYDROCHLORIDE 5 MG/1
5 TABLET ORAL EVERY 6 HOURS PRN
Qty: 20 TABLET | Refills: 0 | Status: SHIPPED | OUTPATIENT
Start: 2023-11-06 | End: 2023-11-11

## 2023-11-06 RX ORDER — TAMSULOSIN HYDROCHLORIDE 0.4 MG/1
0.4 CAPSULE ORAL DAILY
Qty: 30 CAPSULE | Refills: 0 | Status: SHIPPED | OUTPATIENT
Start: 2023-11-07 | End: 2023-12-07

## 2023-11-06 RX ADMIN — SERTRALINE 150 MG: 100 TABLET, FILM COATED ORAL at 09:15

## 2023-11-06 RX ADMIN — METOCLOPRAMIDE 5 MG: 5 TABLET ORAL at 02:29

## 2023-11-06 RX ADMIN — CYCLOBENZAPRINE 10 MG: 10 TABLET, FILM COATED ORAL at 14:21

## 2023-11-06 RX ADMIN — Medication 300 MG: at 09:15

## 2023-11-06 RX ADMIN — OXYCODONE HYDROCHLORIDE 5 MG: 5 TABLET ORAL at 09:13

## 2023-11-06 RX ADMIN — AMLODIPINE BESYLATE 10 MG: 5 TABLET ORAL at 09:15

## 2023-11-06 RX ADMIN — METOCLOPRAMIDE 5 MG: 5 TABLET ORAL at 12:03

## 2023-11-06 RX ADMIN — SUCRALFATE 1 G: 1 TABLET ORAL at 09:15

## 2023-11-06 RX ADMIN — TAMSULOSIN HYDROCHLORIDE 0.4 MG: 0.4 CAPSULE ORAL at 09:16

## 2023-11-06 RX ADMIN — Medication 10 ML: at 09:13

## 2023-11-06 RX ADMIN — ACETAMINOPHEN 650 MG: 325 TABLET, FILM COATED ORAL at 14:21

## 2023-11-06 RX ADMIN — SENNOSIDES AND DOCUSATE SODIUM 2 TABLET: 8.6; 5 TABLET ORAL at 09:15

## 2023-11-06 RX ADMIN — ANASTROZOLE 1 MG: 1 TABLET ORAL at 09:16

## 2023-11-06 RX ADMIN — LOSARTAN POTASSIUM 25 MG: 25 TABLET, FILM COATED ORAL at 09:16

## 2023-11-06 RX ADMIN — Medication 10 ML: at 09:12

## 2023-11-06 RX ADMIN — LEVOTHYROXINE SODIUM 100 MCG: 0.1 TABLET ORAL at 06:03

## 2023-11-06 RX ADMIN — LANSOPRAZOLE 30 MG: 30 TABLET, ORALLY DISINTEGRATING ORAL at 09:16

## 2023-11-06 RX ADMIN — SUCRALFATE 1 G: 1 TABLET ORAL at 12:03

## 2023-11-06 NOTE — DISCHARGE PLACEMENT REQUEST
"Diane Apple (83 y.o. Female)       Date of Birth   1940    Social Security Number       Address   [address] [place] [address] [address] [place] [address]    Home Phone       MRN   [id number]       Protestant   Non-Yarsanism    Marital Status                               Admission Date   10/10/23    Admission Type   Emergency    Admitting Provider   Kalyan Mckeon MD    Attending Provider   Christian Christina MD    Department, Room/Bed   [facility],        Discharge Date       Discharge Disposition       Discharge Destination                                 Attending Provider: Christian Christina MD    Allergies: No Known Allergies    Isolation: None   Infection: None   Code Status: No CPR    Ht: 160 cm (63\")   Wt: 73.4 kg (161 lb 13.1 oz)    Admission Cmt: None   Principal Problem: Acute hypoxemic respiratory failure [J96.01]                   Active Insurance as of 10/10/2023       Primary Coverage       Payor Plan Insurance Group Employer/Plan Group    HUMANA MEDICARE REPLACEMENT HU[employer]NA MEDICARE REPLACEMENT [id number]       Payor Plan Address Payor Plan Phone Number Payor Plan Fax Number Effective Dates    [address] [address] [phone]  2021 - None Entered    [facility] [address]         Subscriber Name Subscriber Birth Date Member ID       DIANE APPLE 1940 [id number]                     Emergency Contacts        (Rel.) Home Phone Work Phone Mobile Phone    KOFI APPLE (Spouse) -- -- [phone]    CHERYL SLAUGHTER (Daughter) [phone] -- --    noemyjoanna (Sister) -- -- [phone]                 History & Physical        Kalyan Mckeon MD at 10/10/23 1900              [facility] Medicine Services  History & Physical    Patient Name: Diane Apple  : 1940  MRN: [id number]  Primary Care Physician:  Asia Queen, APRN  Date of admission: 10/10/2023  Date and Time of Service: 10/10/2023 at 1900.    Subjective      Chief " Complaint: Patient came in after a fall with right rib and right-sided back pain.    History of Present Illness: Diane Guan is a 83 y.o. female with past medical history of DM 2, hypertension and breast cancer who [facility] complaining of fall in the bathroom today with hitting her right side of the rib cage and right back with pain.  She says that her right leg has a little problem giving away at times and gave her while she was in the restroom today and she fell hitting her right side of the chest wall and right back on the commode.  She had pretty significant pain and that is what brought her here.  She says that she had some cough going on for about 3 weeks and was given a Z-Erick with which her cough is getting better.  She denies any fever chills shortness of breath nausea vomiting diarrhea constipation abdominal pain hematemesis hematochezia melena hemoptysis dysuria or hematuria.  Patient received a dose of Dilaudid in the ER and then her oxygen saturation dropped and she had to be started on 2 L of oxygen by nasal cannula.  I believe the Dilaudid caused some respiratory depression on top of her difficulty to take a deep breath anyways because of the pain secondary to fall has caused this acute respiratory failure.      Review of Systems   Constitutional: Negative for chills, fever and night sweats.   HENT:  Positive for congestion. Negative for hoarse voice and sore throat.    Eyes:  Negative for blurred vision and double vision.   Cardiovascular:  Positive for chest pain. Negative for dyspnea on exertion, leg swelling, near-syncope, orthopnea, palpitations and syncope.   Respiratory:  Positive for cough. Negative for hemoptysis, shortness of breath, sputum production and wheezing.    Endocrine: Negative for cold intolerance and heat intolerance.   Skin:  Negative for itching and rash.   Musculoskeletal:  Positive for back pain and myalgias. Negative for neck pain  and stiffness.        Patient had a fall today and last fall was about a year ago.   Gastrointestinal:  Negative for abdominal pain, constipation, diarrhea, hematemesis, hematochezia, melena, nausea and vomiting.   Genitourinary:  Negative for dysuria, frequency, hematuria and urgency.   Neurological:  Negative for excessive daytime sleepiness, dizziness, focal weakness, headaches, light-headedness, loss of balance and weakness.   Psychiatric/Behavioral:  Negative for altered mental status, depression and hallucinations.           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    Depression     Disease of thyroid gland     Diverticulosis of colon 2018    Identified on colonoscopy    Dysphagia 12/2020    Dyspnea on exertion     Hemorrhoid     Hiatal hernia 12/2020    Hiatal hernia with GERD     large hiatal hernia with high-grade reflux on barium swallow; s/p laparoscopic fundoplication with gastropexy    History of diabetes mellitus     no meds now    Hyperlipidemia     Hypertension     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    aspiration pneumonia; completed abx therapy    OAB (overactive bladder)     Skin cancer     Sleep apnea     daughter states pt does not have and doesn't have machine       Past Surgical History:   Procedure Laterality Date    BLADDER SURGERY      BREAST BIOPSY      BRONCHOSCOPY N/A 09/13/2019    Procedure: BRONCHOSCOPY with bronchial washing;  Surgeon: Marnie Frankel MD;  Location: [facility] ENDOSCOPY;  Service: Pulmonary    COLONOSCOPY  07/19/2018    severe diverticulosis    CYST REMOVAL      Removed a fatty cyst off of her back    ENDOSCOPY N/A 11/23/2020    Procedure: ESOPHAGOGASTRODUODENOSCOPY with dilatation (Bougie # 48, 50, 52, 54, 56, 58);  Surgeon: Den Plummer DO;  Location:  [facility] ENDOSCOPY;  Service: General;  Laterality: N/A;  Post: large hiatal hernia, joshua's ulcers    HIATAL HERNIA REPAIR N/A 12/30/2020    Procedure: Laparoscopic hiatal hernia repair with gastropexy;  Surgeon: Den Plummer DO;  Location: [facility] MAIN OR;  Service: General;  Laterality: N/A;    MASTECTOMY Right 02/04/2019    Invasive ductal carcinoma    TUBAL ABDOMINAL LIGATION         Family History: family history includes Arthritis in an other family member; Diabetes in her mother; Heart disease in an other family member. Otherwise pertinent FHx was reviewed and not pertinent to current issue.    Social History:  reports that she quit smoking about 31 years ago. Her smoking use included cigarettes. She started smoking about 33 years ago. She has a 1.00 pack-year smoking history. She has never used smokeless tobacco. She reports that she does not drink alcohol and does not use drugs.    Home Medications:  Prior to Admission Medications       Prescriptions Last Dose Informant Patient Reported? Taking?    Accu-Chek Softclix Lancets lancets   No No    1 each by Other route Daily. Use as instructed    Alcohol Swabs ([employer] Alcohol Prep Pads) 70 % pads   Yes No    APPLY 1 EACH TOPICALLY DAILY.    amLODIPine (NORVASC) 10 MG tablet   No No    Take 1 tablet by mouth Daily.    anastrozole (ARIMIDEX) 1 MG tablet   No No    Take 1 tablet by mouth Daily.    budesonide-formoterol (Symbicort) 80-4.5 MCG/ACT inhaler   No No    Inhale 2 puffs 2 (Two) Times a Day.    Patient not taking:  Reported on 9/15/2023    cetirizine (zyrTEC) 10 MG tablet   Yes No    Take 1 tablet by mouth every night at bedtime.    chlorthalidone (HYGROTON) 25 MG tablet   No No    TAKE 1 TABLET BY MOUTH EVERY DAY    Cyanocobalamin (HM Super Vitamin B12) 2500 MCG chewable tablet   Yes No    Chew.    Patient not taking:  Reported on 9/15/2023    docusate sodium (COLACE) 100 MG capsule   Yes No    Take 1 capsule by mouth 2 (Two) Times a Day.    ferrous  sulfate 324 (65 Fe) MG tablet delayed-release EC tablet   Yes No    Take 1 tablet by mouth 3 (Three) Times a Week.    glucose blood (Accu-Chek Guide) test strip   No No    1 each by Other route Daily. Use as instructed    Hearing Aid Batteries misc   No No    1 Units Every 7 (Seven) Days.    ipratropium-albuterol (DUO-NEB) 0.5-2.5 mg/3 ml nebulizer   No No    Take 3 mL by nebulization Every 6 (Six) Hours As Needed for Wheezing or Shortness of Air.    Patient not taking:  Reported on 9/15/2023    irbesartan (AVAPRO) 300 MG tablet   No No    TAKE 1 TABLET BY MOUTH EVERY DAY    Isopropyl Alcohol (Alcohol Wipes) 70 % misc   No No    Apply 1 each topically Daily.    levothyroxine (SYNTHROID, LEVOTHROID) 100 MCG tablet   No No    One tab po q day    LORazepam (ATIVAN) 0.5 MG tablet   No No    Take 1 tablet by mouth Every 8 (Eight) Hours As Needed for Anxiety.    Patient not taking:  Reported on 9/15/2023    metFORMIN (GLUCOPHAGE) 500 MG tablet   No No    Take 1 tablet by mouth Daily With Breakfast.    metoclopramide (REGLAN) 5 MG tablet   No No    Take 1 tablet by mouth 3 (Three) Times a Day As Needed (for nausea and vomiting).    mirtazapine (REMERON) 15 MG tablet   No No    Take 1 tablet by mouth every night at bedtime.    oxyCODONE (ROXICODONE) 5 MG immediate release tablet   Yes No    Take  by mouth See Admin Instructions. Take 1 tablet by mouth every 4-6 hours as needed for pain    Patient not taking:  Reported on 9/15/2023    pantoprazole (PROTONIX) 40 MG EC tablet   No No    TAKE 1 TABLET BY MOUTH EVERY MORNING BEFORE BREAKFAST. TAKE ON AN EMPTY STOMACH!    rOPINIRole (REQUIP) 0.25 MG tablet   No No    Take 1 tablet by mouth Every Night. Take 1 hour before bedtime.    rosuvastatin (CRESTOR) 10 MG tablet   No No    Take 1 tablet by mouth Every Night.    sertraline (ZOLOFT) 100 MG tablet   No No    Take 1.5 tablets by mouth Daily.              Allergies:  No Known Allergies    Objective      Vitals:   Temp:  [97.8 °F  (36.6 °C)] 97.8 °F (36.6 °C)  Heart Rate:  [71-80] 71  Resp:  [18-20] 18  BP: (159-175)/(69-74) 175/74  Flow (L/min):  [2] 2    Physical Exam  Constitutional:       General: She is not in acute distress.  HENT:      Head: Normocephalic and atraumatic.      Mouth/Throat:      Mouth: Mucous membranes are moist.      Pharynx: Oropharynx is clear.   Cardiovascular:      Rate and Rhythm: Normal rate and regular rhythm.      Pulses: Normal pulses.      Heart sounds: Normal heart sounds.   Pulmonary:      Effort: Pulmonary effort is normal.      Comments: Patient has scattered bilateral rhonchi.  Abdominal:      Palpations: Abdomen is soft.      Tenderness: There is no abdominal tenderness.   Musculoskeletal:      Cervical back: Normal range of motion.      Right lower leg: No edema.      Left lower leg: No edema.      Comments: Patient has significant right-sided chest wall tenderness   Skin:     General: Skin is warm and dry.   Neurological:      General: No focal deficit present.      Mental Status: She is alert.   Psychiatric:         Mood and Affect: Mood normal.         Behavior: Behavior normal.          Result Review    Result Review:  I have personally reviewed the results from the time of this admission to 10/10/2023 19:00 EDT and agree with these findings:  [x]  Laboratory  []  Microbiology  [x]  Radiology  []  EKG/Telemetry   []  Cardiology/Vascular   []  Pathology  []  Old records  []  Other:  Most notable findings include:   CBC shows WBC of 30.6, H&H of 11.7 and 38.1 platelets of 182.  CMP shows sodium of 142, potassium 5.2, chloride 110, carbon dioxide 20, BUN 37, creatinine 1.32 and glucose of 125.  LFTs were normal except for alkaline phosphatase of 125.  ABGs on room air were 7.2 9/38/52.8/83.5%.  Respiratory panel showed human rhinovirus enterovirus positivity.  Chest x-ray showed no acute abnormality.      Assessment & Plan        Active Hospital Problems:  Active Hospital Problems    Diagnosis      **Acute respiratory failure      Plan:   83-year-old white female with a past medical history of hypertension, diabetes mellitus type 2 and breast cancer status postmastectomy comes in after a fall with right-sided chest pain and right upper back pain but negative chest x-ray, received Dilaudid in the ER and then went into mild acute respiratory failure.    #Acute respiratory failure:  Most likely secondary to some respiratory depression secondary to Dilaudid on top of difficulty breathing secondary to chest pain due to fall.  No more IV pain medications for her.  We will try to wean off oxygen.  If patient gets off the oxygen, hopefully home tomorrow.    #Fall with right-sided chest pain:  X-ray without any fractures.  Use Tylenol and oxycodone for pain control.    #DM2:  Diabetic diet.  Accu-Cheks and sliding scale insulin.    #Hypertension:  Continue home blood pressure medications.    #Breast cancer:  Stable at this point.  Continue home medications.    Patient is full code.    I have utilized all available immediate resources to obtain, update, or review the patient's current medications.   I confirmed that the patient's Advance Care Plan is present, code status is documented, or surrogate decision maker is listed in the patient's medical record.       DVT prophylaxis:  Medical DVT prophylaxis orders are present.    CODE STATUS:    Level Of Support Discussed With: Patient  Code Status (Patient has no pulse and is not breathing): CPR (Attempt to Resuscitate)  Medical Interventions (Patient has pulse or is breathing): Full Support    Admission Status:  I believe this patient meets observation status.    I discussed the patient's findings and my recommendations with patient.        Signature: Electronically signed by Kalyan Mckeon MD, 10/10/23, 19:00 EDT.  [facility] [place] operated [facility] Hospitalist Team    Electronically signed by Kalyan Mckeon MD at 10/11/23 1016

## 2023-11-06 NOTE — OUTREACH NOTE
Prep Survey      Flowsheet Row Responses   [facility] patient discharged from? [place]   Is LACE score < 7 ? No   Eligibility [facility]   Date of Admission 10/10/23   Date of Discharge 11/06/23   Discharge Disposition Home-Health Care Sv   Discharge diagnosis ARF due to pneumonia/Sepsis due to UTI/Pneumonia   Does the patient have one of the following disease processes/diagnoses(primary or secondary)? Sepsis   Does the patient have Home health ordered? Yes   What is the Home health agency?  [facility]   Is there a DME ordered? Yes   What DME was ordered? home O2 delivered by [place]   Prep survey completed? Yes            Ashlee LOMAX - Registered Nurse

## 2023-11-06 NOTE — PROGRESS NOTES
"RENAL/KCC:     LOS: 27 days    Patient Care Team:  Asia Queen APRN as PCP - General (Nurse Practitioner)  Asia Queen APRN as Nurse Practitioner (Nurse Practitioner)  Ling Bolden MD as Consulting Physician (Hematology and Oncology)    Chief Complaint:  Acute resp failure    Subjective     Interval History:   Chart reviewed  No complaints    Objective     Vital Sign Min/Max for last 24 hours  Temp  Min: 98.4 °F (36.9 °C)  Max: 98.9 °F (37.2 °C)   BP  Min: 114/48  Max: 129/58   Pulse  Min: 69  Max: 83   Resp  Min: 12  Max: 20   SpO2  Min: 90 %  Max: 94 %   Flow (L/min)  Min: 2  Max: 2   Weight  Min: 73.4 kg (161 lb 13.1 oz)  Max: 73.4 kg (161 lb 13.1 oz)     Flowsheet Rows      Flowsheet Row First Filed Value   Admission Height 162.6 cm (64\") Documented at 10/10/2023 1252   Admission Weight 78.5 kg (173 lb) Documented at 10/10/2023 1252            No intake/output data recorded.  I/O last 3 completed shifts:  In: 240 [P.O.:240]  Out: 1100 [Urine:1100]    Physical Exam:  GEN: Awake, NAD  ENT: OP clear  NECK: Supple, no JVD  CHEST: Lungs are clear  CV: RRR, no M/G/R, no peripheral edema  ABD: Soft, NT, +BS  SKIN: Warm and Dry  NEURO: CN's intact      WBC WBC   Date Value Ref Range Status   11/06/2023 32.20 (C) 3.40 - 10.80 10*3/mm3 Final   11/04/2023 41.00 (C) 3.40 - 10.80 10*3/mm3 Final   11/04/2023 33.60 (C) 3.40 - 10.80 10*3/mm3 Final      HGB Hemoglobin   Date Value Ref Range Status   11/06/2023 8.4 (L) 12.0 - 15.9 g/dL Final   11/04/2023 8.2 (L) 12.0 - 15.9 g/dL Final   11/04/2023 8.6 (L) 12.0 - 15.9 g/dL Final      HCT Hematocrit   Date Value Ref Range Status   11/06/2023 27.3 (L) 34.0 - 46.6 % Final   11/04/2023 26.9 (L) 34.0 - 46.6 % Final   11/04/2023 28.0 (L) 34.0 - 46.6 % Final      Platlets No results found for: \"LABPLAT\"   MCV MCV   Date Value Ref Range Status   11/06/2023 87.8 79.0 - 97.0 fL Final   11/04/2023 87.6 79.0 - 97.0 fL Final   11/04/2023 87.0 79.0 - 97.0 fL Final    " "      Sodium Sodium   Date Value Ref Range Status   11/06/2023 132 (L) 136 - 145 mmol/L Final   11/04/2023 133 (L) 136 - 145 mmol/L Final   11/04/2023 137 136 - 145 mmol/L Final      Potassium Potassium   Date Value Ref Range Status   11/06/2023 3.9 3.5 - 5.2 mmol/L Final   11/04/2023 4.4 3.5 - 5.2 mmol/L Final     Comment:     Slight hemolysis detected by analyzer. Results may be affected.   11/04/2023 4.3 3.5 - 5.2 mmol/L Final      Chloride Chloride   Date Value Ref Range Status   11/06/2023 94 (L) 98 - 107 mmol/L Final   11/04/2023 96 (L) 98 - 107 mmol/L Final   11/04/2023 99 98 - 107 mmol/L Final      CO2 CO2   Date Value Ref Range Status   11/06/2023 26.0 22.0 - 29.0 mmol/L Final   11/04/2023 26.0 22.0 - 29.0 mmol/L Final   11/04/2023 29.0 22.0 - 29.0 mmol/L Final      BUN BUN   Date Value Ref Range Status   11/06/2023 32 (H) 8 - 23 mg/dL Final   11/04/2023 37 (H) 8 - 23 mg/dL Final   11/04/2023 38 (H) 8 - 23 mg/dL Final      Creatinine Creatinine   Date Value Ref Range Status   11/06/2023 1.00 0.57 - 1.00 mg/dL Final   11/04/2023 1.04 (H) 0.57 - 1.00 mg/dL Final   11/04/2023 1.03 (H) 0.57 - 1.00 mg/dL Final      Calcium Calcium   Date Value Ref Range Status   11/06/2023 9.1 8.6 - 10.5 mg/dL Final   11/04/2023 9.1 8.6 - 10.5 mg/dL Final   11/04/2023 9.6 8.6 - 10.5 mg/dL Final      PO4 No results found for: \"CAPO4\"   Albumin Albumin   Date Value Ref Range Status   11/06/2023 3.2 (L) 3.5 - 5.2 g/dL Final   11/04/2023 3.2 (L) 3.5 - 5.2 g/dL Final   11/04/2023 3.4 (L) 3.5 - 5.2 g/dL Final      Magnesium Magnesium   Date Value Ref Range Status   11/06/2023 2.2 1.6 - 2.4 mg/dL Final   11/04/2023 2.5 (H) 1.6 - 2.4 mg/dL Final   11/04/2023 2.9 (H) 1.6 - 2.4 mg/dL Final      Uric Acid No results found for: \"URICACID\"        Results Review:     I reviewed the patient's new clinical results.    amLODIPine, 10 mg, Nasogastric, Q24H  anastrozole, 1 mg, Oral, Daily  Barium Sulfate, 1 teaspoon(s), Oral, Once in " imaging  enoxaparin, 40 mg, Subcutaneous, Daily  Ferrous Sulfate, 300 mg, Nasogastric, Once per day on Mon Wed Fri  insulin lispro, 4-24 Units, Subcutaneous, Q6H  lansoprazole, 30 mg, Nasogastric, BID  levothyroxine, 100 mcg, Nasogastric, Q AM  losartan, 25 mg, Oral, Q24H  metoclopramide, 5 mg, Nasogastric, Q8H  rOPINIRole, 0.25 mg, Nasogastric, Nightly  rosuvastatin, 10 mg, Nasogastric, Nightly  senna-docusate sodium, 2 tablet, Nasogastric, BID  sertraline, 150 mg, Nasogastric, Daily  sodium chloride, 10 mL, Intravenous, Q12H  sodium chloride, 10 mL, Intravenous, Q12H  sucralfate, 1 g, Nasogastric, 4x Daily AC & at Bedtime  tamsulosin, 0.4 mg, Oral, Daily             Medication Review: Reviewed    Assessment & Plan     LEXUS/CKD3  Sepsis  PNA  UTI    Acute hypoxemic respiratory failure    Mixed anxiety and depressive disorder    Primary osteoarthritis involving multiple joints    Hiatal hernia with gastroesophageal reflux    Mixed hyperlipidemia    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    Acquired hypothyroidism    Type 2 diabetes mellitus with stage 3b chronic kidney disease, without long-term current use of insulin    Insomnia    Overactive bladder    Vitamin B 12 deficiency    Vitamin D deficiency    LIBBY (obstructive sleep apnea)    COPD (chronic obstructive pulmonary disease)    CKD (chronic kidney disease) stage 3, GFR 30-59 ml/min    History of breast cancer    Personal history of CLL (chronic lymphocytic leukemia)    History of cigarette smoking    Fall at home    Rhinovirus infection    Acute respiratory failure    Hyperkalemia    Respiratory failure    Pleural effusion    Plan: Cr stable at 1.  Na 132 - will hold HCTZ.  Can use Lasix prn.  Will follow.      Frederick Bonds MD  [facility]  11/06/23  14:11 EST

## 2023-11-06 NOTE — PROGRESS NOTES
Verified demographics. Pt is agreeable to services and has no other agency    Nursing,PT, and OT    Asia Queen NP will be the following provider  [facility] patient

## 2023-11-06 NOTE — PLAN OF CARE
Goal Outcome Evaluation:   A repeat instrumental assessment of swallow was recommended prior to discharge to home today.     The patient was seated up at a 90 degree angle and viewed in the lateral projection. She is currently on 2 L 02 via HF nasal canula for this procedure. The study was initiated with trials of NTL by cup x 2, puree (applesauce mixed with barium) x 2 trials, mechanical soft (peaches) x 2 trials, NTL by cup x 2 more trials, then followed by thin liquid by cup x 3 trials. She is able to hold the cup and drink both thin and NTL independently. SLP offered the pureed and mech soft items to the patient and she was able to hold the spoon and self feed these as well. Labial seal on spoon and cup are adequate. She presents with slow, however functional oral transit and bolus control of the mechanical soft items. Slight premature spillage of the puree consistency noted, with bolus head falling over the base of the tongue approaching the valleculae. With thin liquids the bolus head does reach the level of the pyriform sinus before the swallow is initiated. Swallow delay is approximately 4-5 seconds in duration. Upon initiation of the swallow she presents with weak/reduced base of tongue retraction, decreased laryngeal elevation and incomplete laryngeal vestibular closure. This results in transient/deep, silent penetration of thin liquids (which would equate to 3 - 5 on the Rosen[place] Penetration-Aspiration Scale). She is cued to cough/throat clear and make a dry swallow, which does aid in clearing some of the penetrated material. Thin liquids also noted to have increased vallecular and pyriform sinus residue after the swallow, which places her at risk of penetration/aspiration. An esophageal scan was completed at the end of this procedure and revealed mild, distal esophageal retention with no retrograde flow. After the procedure was terminated (following the last sip of thin liquid) she presented with much  coughing/choking following.     RECOMMENDATIONS:  It is recommended that the patient's diet be upgraded to mechanical soft and continue with NTL at this time. She should be up at a full 90 degree angle for all meals/po. Recommend continuing NTL upon discharge to home and follow up dysphagia therapy via home health services. Would recommend a follow up instrumental assessment of swallow in approximately 4 - 6 weeks after continued skilled dysphagia therapy to assess for progress and/or potential further diet upgrade. She may continue to utilize the [place] Water Protocol in between meals (see above/previous VFSS results/recs).

## 2023-11-06 NOTE — DISCHARGE SUMMARY
[facility] Medicine Services  Discharge Summary    Date of Service: 23    Patient Name: Diane Guan  : 1940  MRN: [id number]    Date of Admission: 10/10/2023  Discharge Diagnosis: ARF due to pneumonia/Sepsis due to UTI/Pneumonia - resolved  Date of Discharge:  23    Primary Care Physician: Asia Queen APRN      Presenting Problem:   Acute respiratory failure [J96.00]  Hypoxemia [R09.02]  Rhinovirus infection [B34.8]  Chronic respiratory failure with hypoxia [J96.11]  Rib pain on right side [R07.81]  Dyspnea, unspecified type [R06.00]  Right low back pain, unspecified chronicity, unspecified whether sciatica present [M54.50]  Cough, unspecified type [R05.9]  Respiratory failure with hypoxia and hypercapnia [J96.91, J96.92]  Respiratory failure [J96.90]    Active and Resolved Hospital Problems:  Active Hospital Problems    Diagnosis POA    **Acute hypoxemic respiratory failure [J96.01] Yes    Respiratory failure [J96.90] Yes    Hyperkalemia [E87.5] Yes    Acute respiratory failure [J96.00] Yes    History of breast cancer [Z85.3] Not Applicable    Personal history of CLL (chronic lymphocytic leukemia) [Z85.6] Not Applicable    History of cigarette smoking [Z87.891] Not Applicable    Fall at home [W19.XXXA, Y92.009] Not Applicable    Rhinovirus infection [B34.8] Yes    CKD (chronic kidney disease) stage 3, GFR 30-59 ml/min [N18.30] Yes    COPD (chronic obstructive pulmonary disease) [J44.9] Yes    LIBBY (obstructive sleep apnea) [G47.33] Yes    Vitamin D deficiency [E55.9] Yes    Vitamin B 12 deficiency [E53.8] Yes    Overactive bladder [N32.81] Yes    Primary osteoarthritis involving multiple joints [M15.9] Yes    Mixed hyperlipidemia [E78.2] Yes    Mixed anxiety and depressive disorder [F41.8] Yes    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 [I13.10, N18.30] Yes    Insomnia [G47.00] Yes    Hiatal hernia with gastroesophageal reflux [K44.9, K21.9] Yes     Type 2 diabetes mellitus with stage 3b chronic kidney disease, without long-term current use of insulin [E11.22, N18.32] Yes    Acquired hypothyroidism [E03.9] Yes      Resolved Hospital Problems   No resolved problems to display.         Hospital Course     Hospital Course:  Diane Guan is a 84 yo F with PMH of DM, HTN, Breast cancer, who presented to the hospital after a fall and was admitted with a principal diagnosis of acute hypoxemic respiratory failure. On admission, patient required 2 L nasal cannula and subsequently had worsening oxygenation with hypercapnic respiratory failure. Failed BiPAP and subsequently was intubated on 10/15.  Initially treated with broad-spectrum antibiotics and ID was consulted.  RVP positive for rhinovirus, sputum cultures were negative.  Treated with diuretics for fluid overload.  Developed profound hypoxia and needed deep sedation.  Had bronch on 10/18. Oxygenation improved with aggressive diuretics.  Sputum cultures grew Klebsiella and treated with meropenem.  Subsequently, extubated on 10/25 but developed hypoxia afterwards, failed BiPAP and had to be reintubated.  Also had LEXUS on admission, nephrology was consulted.  Renal ultrasound with no obstruction.  Treated with diuretics.  Subsequently, urine cultures grew both E. coli and Klebsiella pneumonia.  Treated with meropenem for 7 days. During the hospital course, patient also developed elevated liver enzymes. Gallbladder ultrasound showed dilated common bile duct measuring 14 mm with distended gallbladder and sludge.  GI was consulted and recommended conservative management.  Liver enzymes subsequently normalized.  Subsequently, family decided on DNR/DNI prior to extubation on 10/27.  Did well with BiPAP.   Patient now stable and on 2lpm NC. Walking oximetry done on 11/05/23 - Patient desaturated to 84% on room air, she will need O2 at 2lpm on discharge.         DISCHARGE Follow Up Recommendations for labs and  diagnostics: Outpatient follow up with Gastroenterology, PCP, Cardiology, Oncology      Reasons For Change In Medications and Indications for New Medications:  Flexeril, Flomax, Carafate    Day of Discharge     Vital Signs:  Temp:  [98.4 °F (36.9 °C)-98.9 °F (37.2 °C)] 98.7 °F (37.1 °C)  Heart Rate:  [69-83] 73  Resp:  [12-20] 12  BP: (114-129)/(48-58) 124/52  Flow (L/min):  [2] 2    Physical Exam:  Physical Exam  Constitutional:       Appearance: Normal appearance.   HENT:      Head: Normocephalic and atraumatic.      Nose: Nose normal.      Mouth/Throat:      Mouth: Mucous membranes are moist.   Eyes:      Extraocular Movements: Extraocular movements intact.      Pupils: Pupils are equal, round, and reactive to light.   Cardiovascular:      Rate and Rhythm: Normal rate and regular rhythm.      Pulses: Normal pulses.      Heart sounds: Normal heart sounds.   Pulmonary:      Effort: Pulmonary effort is normal.      Breath sounds: Normal breath sounds.   Abdominal:      General: Bowel sounds are normal.      Palpations: Abdomen is soft.   Musculoskeletal:         General: Normal range of motion.   Skin:     General: Skin is warm.      Capillary Refill: Capillary refill takes less than 2 seconds.   Neurological:      General: No focal deficit present.      Mental Status: She is alert and oriented to person, place, and time.   Psychiatric:         Mood and Affect: Mood normal.            Pertinent  and/or Most Recent Results     LAB RESULTS:      Lab 11/06/23 0135 11/04/23 2333 11/04/23  0800 11/02/23  2328 11/02/23  0005   WBC 32.20* 41.00* 33.60* 43.90* 41.80*   HEMOGLOBIN 8.4* 8.2* 8.6* 8.5* 8.4*   HEMATOCRIT 27.3* 26.9* 28.0* 27.0* 27.3*   PLATELETS 343 396 398 490* 500*   NEUTROS ABS 11.59* 6.15 3.70 6.59 10.45*   EOS ABS 0.64* 1.23* 1.01* 0.44* 1.67*   MCV 87.8 87.6 87.0 87.0 85.3         Lab 11/06/23 0135 11/04/23 2333 11/04/23  0800 11/02/23  2328 11/02/23  0005   SODIUM 132* 133* 137 136 136   POTASSIUM  3.9 4.4 4.3 4.3 4.9   CHLORIDE 94* 96* 99 97* 95*   CO2 26.0 26.0 29.0 29.0 29.0   ANION GAP 12.0 11.0 9.0 10.0 12.0   BUN 32* 37* 38* 52* 57*   CREATININE 1.00 1.04* 1.03* 0.93 0.94   EGFR 56.4* 53.8* 54.4* 61.5 60.7   GLUCOSE 120* 101* 113* 120* 138*   CALCIUM 9.1 9.1 9.6 9.2 9.0   MAGNESIUM 2.2 2.5* 2.9* 3.2* 2.5*         Lab 11/06/23  0135 11/04/23  2333 11/04/23  0800 11/02/23  2328 11/02/23  0005   TOTAL PROTEIN 6.5 6.4 6.0 6.4 6.0   ALBUMIN 3.2* 3.2* 3.4* 3.4* 3.4*   GLOBULIN 3.3 3.2 2.6 3.0 2.6   ALT (SGPT) 18 13 12 15 13   AST (SGOT) 22 18 17 18 24   BILIRUBIN 0.2 0.2 0.2 0.2 0.2   ALK PHOS 153* 147* 142* 143* 137*                     Brief Urine Lab Results  (Last result in the past 365 days)        Color   Clarity   Blood   Leuk Est   Nitrite   Protein   CREAT   Urine HCG        10/14/23 1342             109.8         10/14/23 1342 Yellow   Clear   Negative   Small (1+)   Negative   Negative                 Microbiology Results (last 10 days)       Procedure Component Value - Date/Time    Respiratory Panel PCR w/COVID-19(SARS-CoV-2) LESLEE/VIVIANE/PAVAN/PAD/COR/MAD/JUAN DAVID In-House, NP Swab in UTM/VTM, 3-4 HR TAT - Swab, Nasopharynx [[id number]]  (Normal) Collected: 10/29/23 1113    Lab Status: Final result Specimen: Swab from Nasopharynx Updated: 10/29/23 1218     ADENOVIRUS, PCR Not Detected     Coronavirus 229E Not Detected     Coronavirus HKU1 Not Detected     Coronavirus NL63 Not Detected     Coronavirus OC43 Not Detected     COVID19 Not Detected     Human Metapneumovirus Not Detected     Human Rhinovirus/Enterovirus Not Detected     Influenza A PCR Not Detected     Influenza B PCR Not Detected     Parainfluenza Virus 1 Not Detected     Parainfluenza Virus 2 Not Detected     Parainfluenza Virus 3 Not Detected     Parainfluenza Virus 4 Not Detected     RSV, PCR Not Detected     Bordetella pertussis pcr Not Detected     Bordetella parapertussis PCR Not Detected     Chlamydophila pneumoniae PCR Not Detected      Mycoplasma pneumo by PCR Not Detected    Narrative:      In the setting of a positive respiratory panel with a viral infection PLUS a negative procalcitonin without other underlying concern for bacterial infection, consider observing off antibiotics or discontinuation of antibiotics and continue supportive care. If the respiratory panel is positive for atypical bacterial infection (Bordetella pertussis, Chlamydophila pneumoniae, or Mycoplasma pneumoniae), consider antibiotic de-escalation to target atypical bacterial infection.            XR Chest 1 View    Result Date: 11/6/2023  Impression: Impression: 1. Mild right basilar airspace disease likely atelectasis. 2. Stable cardiomegaly. Electronically Signed: Pavan Amezquita MD  11/6/2023 7:13 AM EST  Workstation ID: [id number]    XR Chest 1 View    Result Date: 11/5/2023  Impression: Impression: 1. Cardiomegaly. 2. Elevation of the right hemidiaphragm with slightly improved right basilar subsegmental ectasis. 3. Questionable small left pleural effusion with atelectasis. Electronically Signed: Jaciel Giraldo MD  11/5/2023 9:14 AM EST  Workstation ID: [id number]    XR Chest 1 View    Result Date: 11/4/2023  Impression: Impression: Stable bibasilar airspace opacities, which may be due to atelectasis and/or pneumonia with possible small pleural effusions. Electronically Signed: Jacquie Arroyo  11/4/2023 8:36 AM EDT  Workstation ID: [id number]    XR Chest 1 View    Result Date: 11/3/2023  Impression: Impression: Interval removal of OG tube Increased bibasilar opacities likely atelectasis or pneumonia Electronically Signed: Dwight Arredondo MD  11/3/2023 7:06 AM EDT  Workstation ID: [id number]    XR Chest 1 View    Result Date: 11/2/2023  Impression: Impression: 1. Esophagogastric tube tip below the diaphragm. 2. Persistent left basilar airspace disease which may relate to atelectasis or pneumonia with small left pleural effusion. Electronically Signed: Pavan Amezquita MD  11/2/2023 7:06 AM  EDT  Workstation ID: [id number]    XR Abdomen 1 View    Result Date: 11/2/2023  Impression: Impression: Gastric suction tube terminates in the gastric lumen. Electronically Signed: Miguel Trent MD  11/2/2023 12:09 AM EDT  Workstation ID: [id number]    XR Chest 1 View    Result Date: 11/1/2023  Impression: Impression: 1. Esophagogastric tube tip below the diaphragm. 2. Persistent left basilar airspace with suspected small left pleural effusion. Electronically Signed: Pavan Amezquita MD  11/1/2023 7:10 AM EDT  Workstation ID: [id number]    XR Chest 1 View    Result Date: 10/31/2023  Impression: Impression: Resolution of right basilar atelectasis. Continued partial atelectasis of the left lower lobe. Electronically Signed: Rosa Melendez MD  10/31/2023 7:15 AM EDT  Workstation ID: [id number]    XR Abdomen KUB    Result Date: 10/30/2023  Impression: Impression: Gastric suction tube terminates in the stomach. Electronically Signed: Miguel Trent MD  10/30/2023 11:14 PM EDT  Workstation ID: [id number]    XR Chest 1 View    Result Date: 10/30/2023  Impression: Impression: 1. Placement of esophagogastric tube with tip below the diaphragm. 2. Mild bibasilar airspace disease likely atelectasis with small bilateral pleural effusions, unchanged. Electronically Signed: Pavan Amezquita MD  10/30/2023 7:08 AM EDT  Workstation ID: [id number]    XR Abdomen KUB    Result Date: 10/29/2023  Impression: Impression: 1. Esophagogastric tube tip overlies the distal stomach. 2. Nonspecific, nonobstructive bowel gas pattern. Electronically Signed: Pavan Amezquita MD  10/29/2023 5:11 PM EDT  Workstation ID: [id number]    XR Abdomen KUB    Result Date: 10/29/2023  Impression: Impression: Esophagogastric tube nonvisualized overlying the lower chest or upper abdomen. Recommend repositioning and follow-up KUB to evaluate for position. Electronically Signed: Pavan Amezquita MD  10/29/2023 3:28 PM EDT  Workstation ID: [id number]    XR Chest 1 View    Result Date:  10/29/2023  Impression: Impression: Persistent bibasilar airspace disease and medial right basilar atelectasis. Electronically Signed: Miguel Trent MD  10/29/2023 1:01 AM EDT  Workstation ID: [id number]    XR Abdomen KUB    Result Date: 10/28/2023  Impression: Impression: 1. Smallbore feeding tube with tip terminating at the distal stomach or proximal duodenum. Electronically Signed: Antony Lopez  10/28/2023 12:23 PM EDT  Workstation ID: [id number]    XR Chest 1 View    Result Date: 10/28/2023  Impression: Impression: Interval extubation and removal of NG/OG tube. Stable cardiomediastinal silhouette within normal limits. There is similar haziness at the left lung base, likely small left pleural effusion. There is a new thin opacity at the right lung base, favor atelectasis. No pneumothorax. Electronically Signed: Mark Rodriguez MD  10/28/2023 8:07 AM EDT  Workstation ID: [id number]    XR Chest 1 View    Result Date: 10/27/2023  Impression: Impression: 1. Stable ET tube and esophagogastric tube. 2. Persistent left basilar airspace disease which may relate to atelectasis and/or pneumonia with small left pleural effusion. 3. Improving right basilar atelectasis. Electronically Signed: Pavan Amezquita MD  10/27/2023 7:08 AM EDT  Workstation ID: [id number]    XR Chest 1 View    Result Date: 10/26/2023  Impression: Impression: Stable left lower lobe airspace disease with a left pleural effusion Life-support structures as above Electronically Signed: Dwight Arredondo MD  10/26/2023 7:36 AM EDT  Workstation ID: [id number]    XR Chest 1 View    Result Date: 10/25/2023  Impression: Impression: Endotracheal tube terminates in the upper thoracic trachea. Left basilar opacity with trace left pleural effusion. This may represent atelectasis or infection. Electronically Signed: Brant Aguirre MD  10/25/2023 8:53 PM EDT  Workstation ID: [id number]    XR Chest 1 View    Result Date: 10/25/2023  Impression: Impression: 1. Stable ET tube and  esophagogastric tube. 2. Hypoinflated lungs with mild left basilar airspace disease likely atelectasis. 3. No pneumothorax. 4. Left upper extremity PICC removed. Electronically Signed: Pavan Amezquita MD  10/25/2023 7:17 AM EDT  Workstation ID: [id number]    XR Chest 1 View    Result Date: 10/24/2023  Impression: Impression: Tubes and lines appear properly positioned. There is cardiomegaly with mild pulmonary vascular congestion. Electronically Signed: David Pan MD  10/24/2023 2:10 AM EDT  Workstation ID: [id number]    XR Chest 1 View    Result Date: 10/23/2023  Impression: Impression: 1. Lines and tubes appear stable 2. Mild vascular crowding and dependent opacities compatible with atelectasis at the lung bases. No great change from comparison given differences in technique Electronically Signed: Fritz Valdivia MD  10/23/2023 7:43 AM EDT  Workstation ID: [id number]    XR Chest 1 View    Result Date: 10/22/2023  Impression: Impression: No significant interval change noted. Electronically Signed: Yohan Merida MD  10/22/2023 8:01 AM CDT  Workstation ID: [id number]    US Abdomen Limited    Result Date: 10/19/2023  Impression: Impression: 1. Dilated common bile duct measuring 14 mm. Correlate for clinical evidence of biliary obstruction and consider MRCP for further assessment. 2. Distended gallbladder with mild layering sludge. No gallbladder wall thickening or pericholecystic inflammation. 3. Left hepatic lobe 2 cm hyperechoic lesion suggesting hemangioma. 4. Right lower pole renal cyst measuring 1.2 cm. Electronically Signed: Pavan Amezquita MD  10/19/2023 7:36 AM EDT  Workstation ID: [id number]    CT Abdomen Pelvis Without Contrast    Result Date: 10/18/2023  Impression: Impression: Please see CT chest report for description of abnormalities of the lungs. Moderately distended gallbladder and bile duct dilatation. Correlation with gallbladder ultrasound may be useful. Diverticulosis without acute diverticulitis. Electronically  Signed: Rosa Melendez MD  10/18/2023 2:26 PM EDT  Workstation ID: [id number]    CT Chest Without Contrast Diagnostic    Result Date: 10/18/2023  Impression: Impression: Small effusions. Areas of infiltrate likely represent combination of atelectasis and pneumonia. Electronically Signed: Rosa Melendez MD  10/18/2023 2:22 PM EDT  Workstation ID: [id number]    XR Chest 1 View    Result Date: 10/18/2023  Impression: Impression: 1. ET tube is in satisfactory position in the midthoracic trachea. 2.Persistent dense retrocardiac left lower lobe atelectasis or pneumonia and mild right basilar atelectasis 3. Stable small left pleural effusion Electronically Signed: Marta Carrillo MD  10/18/2023 11:01 AM EDT  Workstation ID: [id number]    XR Chest 1 View    Result Date: 10/18/2023  Impression: Impression: 1. Pulmonary vascular congestion and mild pulmonary edema with small bilateral pleural effusions and bibasilar atelectasis. Overall similar compared to prior. 2. NG tube and ET tube in satisfactory position. Electronically Signed: Levon Berumen MD  10/18/2023 7:10 AM EDT  Workstation ID: [id number]    XR Chest 1 View    Result Date: 10/17/2023  Impression: Impression: 1. Small right pleural effusion and trace left pleural effusion, increased from prior. 2. Cardiomegaly with mild pulmonary vascular congestion. 3. Tubes and lines as above. Electronically Signed: David Chester MD  10/17/2023 6:02 PM EDT  Workstation ID: [id number]    XR Chest 1 View    Result Date: 10/16/2023  Impression: Impression: No significant change from prior. Electronically Signed: Rosa Melendez MD  10/16/2023 11:01 AM EDT  Workstation ID: [id number]    US Renal Bilateral    Result Date: 10/16/2023  Impression: Impression: No hydronephrosis or acute sonographic abnormality. Electronically Signed: Pavan Amezquita MD  10/16/2023 7:09 AM EDT  Workstation ID: [id number]    XR Chest 1 View    Result Date: 10/16/2023  Impression: Impression: 1. Retraction of ET tube with  tip now 2.5 cm above the jaden. 2. Esophagogastric tube tip below the diaphragm. 3. Hypoinflated lungs with unchanged left basilar airspace disease and small left pleural effusion. Electronically Signed: Pavan Amezquita MD  10/16/2023 6:44 AM EDT  Workstation ID: [id number]    XR Abdomen KUB    Result Date: 10/15/2023  Impression: Impression: Enteric tube within the stomach. Electronically Signed: Lucy Townsend MD  10/15/2023 8:49 PM EDT  Workstation ID: [id number]    XR Abdomen KUB    Result Date: 10/15/2023  Impression: Impression: 1. Nonvisualization of the gastric suction tube. The tube was coiled within the neck on the concurrent chest radiograph and was subsequently removed. 2. Endotracheal tube coursing towards the right mainstem. This was retracted after radiograph per patient's nurse. Electronically Signed: Antony Lopez  10/15/2023 3:03 PM EDT  Workstation ID: [id number]    XR Chest 1 View    Result Date: 10/15/2023  Impression: Impression: 1. Endotracheal tube tip terminating at the jaden slightly deviating to the right of the midline. This was discussed with patient's nurse and they reported that the tube had been retracted after radiograph. 2. Gastric suction tube coiled within the neck and had been removed after radiograph. 3. Small left-sided pleural effusion and left basilar airspace disease. Findings discussed with patient's nurse Kyle Mcclellan at 2:57 p.m. on 10/15/2023 Electronically Signed: Antony Lopez  10/15/2023 3:02 PM EDT  Workstation ID: [id number]    XR Chest 1 View    Result Date: 10/15/2023  Impression: Impression: 1. Low lung volumes with persistent left basilar airspace disease. Minimal linear atelectasis within the right lung base. No significant change from prior exam. Electronically Signed: Antony Lopez  10/15/2023 9:37 AM EDT  Workstation ID: [id number]    CT Head Without Contrast    Result Date: 10/14/2023  Impression: Impression: Brain atrophy with chronic small vessel ischemic changes No  acute intracranial abnormality Electronically Signed: Dwight Arredondo MD  10/14/2023 2:43 PM EDT  Workstation ID: [id number]    XR Abdomen KUB    Result Date: 10/14/2023  Impression: Impression: Nonobstructive bowel gas pattern. No acute process. Electronically Signed: Lucy Townsend MD  10/14/2023 6:47 AM EDT  Workstation ID: [id number]    XR Chest 1 View    Result Date: 10/13/2023  Impression: Left costophrenic angle opacity may represent small pleural effusion, atelectasis, or combination of these. Electronically Signed: Marta Carrillo MD  10/13/2023 11:54 AM EDT  Workstation ID: [id number]    XR Chest 2 View    Result Date: 10/10/2023  Impression: Impression: No acute abnormality. No definite evidence of a displaced rib fracture or pneumothorax. Electronically Signed: Jameel Ambrose DO  10/10/2023 3:29 PM EDT  Workstation ID: [id number]     Results for orders placed during the hospital encounter of 12/30/20    Duplex Venous Lower Extremity - Bilateral CAR    Interpretation Summary  · Normal bilateral lower extremity venous duplex scan.      Results for orders placed during the hospital encounter of 12/30/20    Duplex Venous Lower Extremity - Bilateral CAR    Interpretation Summary  · Normal bilateral lower extremity venous duplex scan.      Results for orders placed during the hospital encounter of 10/10/23    Adult Transthoracic Echo Limited W/ Cont if Necessary Per Protocol    Interpretation Summary  Normal left and right ventricular size and systolic function  Indeterminate left ventricular diastolic function  Aortic valve sclerosis with trace aortic regurgitation.  Mild pulmonary hypertension      Labs Pending at Discharge:  Pending Labs       Order Current Status    AFB Culture - Wash, Bronchus Preliminary result    Fungus Culture - Wash, Bronchus Preliminary result            Procedures Performed  Procedure(s):  BRONCHOSCOPY AT BEDSIDE         Consults:   Consults       Date and Time Order Name Status  Description    10/29/2023  5:45 PM Inpatient Gastroenterology Consult      10/29/2023  4:35 AM Hematology & Oncology Inpatient Consult      10/28/2023  5:11 PM Inpatient Hospitalist Consult      10/19/2023  9:13 AM Inpatient Gastroenterology Consult Completed     10/18/2023 12:24 PM Inpatient Infectious Diseases Consult Completed     10/14/2023 10:34 AM Inpatient Nephrology Consult Completed               Discharge Details        Discharge Medications        New Medications        Instructions Start Date   cyclobenzaprine 10 MG tablet  Commonly known as: FLEXERIL   10 mg, Oral, 3 Times Daily PRN      sucralfate 1 g tablet  Commonly known as: CARAFATE   1 g, Oral, 4 Times Daily Before Meals & Nightly      tamsulosin 0.4 MG capsule 24 hr capsule  Commonly known as: FLOMAX   0.4 mg, Oral, Daily   Start Date: November 7, 2023            Changes to Medications        Instructions Start Date   oxyCODONE 5 MG immediate release tablet  Commonly known as: ROXICODONE  What changed:   how much to take  when to take this  reasons to take this  additional instructions   5 mg, Oral, Every 6 Hours PRN             Continue These Medications        Instructions Start Date   Accu-Chek Guide test strip  Generic drug: glucose blood   1 each, Other, Daily, Use as instructed      Accu-Chek Softclix Lancets lancets   1 each, Other, Daily, Use as instructed      Alcohol Wipes 70 % misc   1 each, Apply externally, Daily      amLODIPine 10 MG tablet  Commonly known as: NORVASC   10 mg, Oral, Daily      anastrozole 1 MG tablet  Commonly known as: ARIMIDEX   1 mg, Oral, Daily      cetirizine 10 MG tablet  Commonly known as: zyrTEC   10 mg, Oral, Every Night at Bedtime      chlorthalidone 25 MG tablet  Commonly known as: HYGROTON   TAKE 1 TABLET BY MOUTH EVERY DAY      [employer] Alcohol Prep Pads 70 % pads   APPLY 1 EACH TOPICALLY DAILY.      docusate sodium 100 MG capsule  Commonly known as: COLACE   100 mg, Oral, 2 Times Daily      ferrous sulfate  324 (65 Fe) MG tablet delayed-release EC tablet   324 mg, Oral, 3 Times Weekly      Hearing Aid Batteries misc   1 Units, Does not apply, Every 7 Days      irbesartan 300 MG tablet  Commonly known as: AVAPRO   TAKE 1 TABLET BY MOUTH EVERY DAY      levothyroxine 100 MCG tablet  Commonly known as: SYNTHROID, LEVOTHROID   One tab po q day      metFORMIN 500 MG tablet  Commonly known as: GLUCOPHAGE   500 mg, Oral, Daily With Breakfast      metoclopramide 5 MG tablet  Commonly known as: REGLAN   5 mg, Oral, 3 Times Daily PRN      mirtazapine 15 MG tablet  Commonly known as: REMERON   15 mg, Oral, Every Night at Bedtime      pantoprazole 40 MG EC tablet  Commonly known as: PROTONIX   40 mg, Oral, Every Morning Before Breakfast, Take on an empty stomach!      rOPINIRole 0.25 MG tablet  Commonly known as: REQUIP   0.25 mg, Oral, Nightly, Take 1 hour before bedtime.      rosuvastatin 10 MG tablet  Commonly known as: CRESTOR   10 mg, Oral, Nightly      sertraline 100 MG tablet  Commonly known as: ZOLOFT   150 mg, Oral, Daily               No Known Allergies      Discharge Disposition:   Home-Health Care [facility]    Diet:  Hospital:  Diet Order   Procedures    Diet: Regular/House Diet; Texture: Pureed (NDD 1); Fluid Consistency: Nectar Thick         Discharge Activity:         CODE STATUS:  Code Status and Medical Interventions:   Ordered at: 10/27/23 1345     Medical Intervention Limits:    NO intubation (DNI)     Code Status (Patient has no pulse and is not breathing):    No CPR (Do Not Attempt to Resuscitate)     Medical Interventions (Patient has pulse or is breathing):    Limited Support     Comments:    Spoke with daughter and  at the bedside on 10/27/2023         Future Appointments   Date Time Provider Department Center   1/15/2024  9:40 AM LAB MD [facility]   1/15/2024  9:45 AM Ling Bolden MD [facility]       Additional Instructions for the Follow-ups that You Need to Schedule        Ambulatory Referral to Home Health (Hospital)   As directed      Face to Face Visit Date: 11/5/2023   Follow-up provider for Plan of Care?: I treated the patient in an acute care facility and will not continue treatment after discharge.   Follow-up provider: PABLO PARRISH [[id number]]   Reason/Clinical Findings: Weakness   Describe mobility limitations that make leaving home difficult: weakness   Nursing/Therapeutic Services Requested: Physical Therapy Occupational Therapy   PT orders: Home safety assessment Strengthening Transfer training   Occupational orders: Strengthening Energy conservation   Frequency: 1 Week 1                Time spent on Discharge including face to face service:  35 minutes    Discussed plan of care with patient and her daughter at bedside.  Discussed with RN and  in a.m. rounds.  11/06/23      Signature: Electronically signed by Christian Christina MD, 11/06/23, 15:39 EST.  [facility] Hospitalist Team

## 2023-11-06 NOTE — PLAN OF CARE
Goal Outcome Evaluation:                      Pt meets criteria for discharge--being discharged home. Daughter transporting pt home. Pt's IV and heart monitor removed. Discharge instrcutions provided to and reviewed with pt and daughter. [place] delivered oxygen--at bedside. Pt and daughter verbalize understanding of discharge plan.

## 2023-11-06 NOTE — PLAN OF CARE
Goal Outcome Evaluation:           Progress: no change  Outcome Evaluation: Patient taking PO medications, on

## 2023-11-06 NOTE — PROGRESS NOTES
Hematology/Oncology Inpatient Progress Note    PATIENT NAME: Diane Guan  : 1940  MRN: [id number]    CHIEF COMPLAINT: Falls, respiratory failure, leukocytosis with lymphocytosis    HISTORY OF PRESENT ILLNESS:      Diane Guan is a 83 y.o. female who presented to [facility] on 10/10/2023 with complaints of      MsaZchery Guan was admitted to the hospital on 10/10/2023. She had fallen, sustaining some trauma to the chest. She complained of weakness of the right lower extremity. In addition she had a respiratory panel positive for rhinovirus. She had been treated in the past for early stage breast cancer and had remained free of evidence of recurrent disease. In addition she was known for chronic lymphocytic leukemia and had maintained significant lymphocytosis for some time but had never needed treatment. At the time of the admission she had maintained a similar white cell count. Today for the first time she had a very significant increase in the total white cell count and platelets. In addition she had significant thrombocytosis.   In spite of all efforts she had very slow improvement. She had continued to have difficulties with weakness and anorexia. She developed pneumonia and had to be intubated; she was finally extubated on 10/27.  On 10/23/2023 she was found to have positive blood cultures for E coli and Klebsiella. At the time of this consult she was still on antibiotics. She also had had evidence of gastrointestinal bleeding; she was diagnosed with iron deficiency and was started on iron but iron studies were not available.      He/She  has a past medical history of Acute bronchitis due to human metapneumovirus (2020), Anxiety disorder, Arthritis, Breast cancer (2019), Chronic lymphocytic leukemia (CLL), B-cell (2019), Depression, Disease of thyroid gland, Diverticulosis of colon (), Dysphagia (2020), Dyspnea on exertion, Fall at home (10/11/2023),  Hemorrhoid, Hiatal hernia (12/2020), Hiatal hernia with GERD, History of breast cancer (10/11/2023), History of cigarette smoking (10/11/2023), History of diabetes mellitus, Hyperlipidemia, Hypertension, Mycobacterium avium complex (02/2019), OAB (overactive bladder), Personal history of CLL (chronic lymphocytic leukemia) (10/11/2023), Skin cancer, and Sleep apnea.     PCP: Asia Queen, [place]    Subjective       Overall patient is improving.  P.o. intake has also improved.      ROS:    Review of Systems   Constitutional:  Positive for activity change, appetite change and fatigue. Negative for chills and fever.   HENT:  Negative for congestion, drooling, ear discharge, rhinorrhea, sinus pressure and tinnitus.    Eyes:  Negative for photophobia, pain and discharge.   Respiratory:  Negative for apnea, choking and stridor.    Cardiovascular:  Negative for palpitations.   Gastrointestinal:  Negative for abdominal distention, abdominal pain and anal bleeding.   Endocrine: Negative for polydipsia and polyphagia.   Genitourinary:  Negative for decreased urine volume, flank pain and genital sores.   Musculoskeletal:  Negative for gait problem, neck pain and neck stiffness.   Skin:  Negative for color change, rash and wound.   Neurological:  Positive for weakness. Negative for tremors, seizures, syncope, facial asymmetry and speech difficulty.   Hematological:  Negative for adenopathy.   Psychiatric/Behavioral:  Negative for agitation, confusion, hallucinations and self-injury. The patient is not hyperactive.         MEDICATIONS:    Scheduled Meds:  amLODIPine, 10 mg, Nasogastric, Q24H  anastrozole, 1 mg, Oral, Daily  Barium Sulfate, 1 teaspoon(s), Oral, Once in imaging  chlorthalidone, 25 mg, Nasogastric, Daily  enoxaparin, 40 mg, Subcutaneous, Daily  Ferrous Sulfate, 300 mg, Nasogastric, Once per day on Mon Wed Fri  insulin lispro, 4-24 Units, Subcutaneous, Q6H  lansoprazole, 30 mg, Nasogastric, BID  levothyroxine, 100  "mcg, Nasogastric, Q AM  losartan, 25 mg, Oral, Q24H  metoclopramide, 5 mg, Nasogastric, Q8H  rOPINIRole, 0.25 mg, Nasogastric, Nightly  rosuvastatin, 10 mg, Nasogastric, Nightly  senna-docusate sodium, 2 tablet, Nasogastric, BID  sertraline, 150 mg, Nasogastric, Daily  sodium chloride, 10 mL, Intravenous, Q12H  sodium chloride, 10 mL, Intravenous, Q12H  sucralfate, 1 g, Nasogastric, 4x Daily AC & at Bedtime  tamsulosin, 0.4 mg, Oral, Daily       Continuous Infusions:      PRN Meds:    acetaminophen    cyclobenzaprine    dextrose    dextrose    dextrose    glucagon (human recombinant)    hydrALAZINE    ipratropium-albuterol    lidocaine    LORazepam    nitroglycerin    ondansetron    oxyCODONE    polyethylene glycol    [COMPLETED] Insert Peripheral IV **AND** sodium chloride    sodium chloride    sodium chloride    sodium chloride    sodium chloride     ALLERGIES:  No Known Allergies    Objective    VITALS:   /48 (BP Location: Left arm, Patient Position: Lying)   Pulse 73   Temp 98.9 °F (37.2 °C) (Oral)   Resp 13   Ht 160 cm (63\")   Wt 73.4 kg (161 lb 13.1 oz)   SpO2 90%   BMI 28.66 kg/m²     PHYSICAL EXAM: (performed by MD)  Physical Exam  Constitutional:       General: She is in acute distress.      Appearance: She is ill-appearing.   HENT:      Head:      Comments: Ng tube in palce     Mouth/Throat:      Mouth: Mucous membranes are moist.      Pharynx: Oropharynx is clear.   Eyes:      Extraocular Movements: Extraocular movements intact.      Pupils: Pupils are equal, round, and reactive to light.   Pulmonary:      Effort: Pulmonary effort is normal.   Neurological:      General: No focal deficit present.      Mental Status: Mental status is at baseline.       I have reexamined the patient and the results are consistent with the previously documented exam. Ling Bolden MD      RECENT LABS:    Lab Results (last 24 hours)       Procedure Component Value Units Date/Time    CBC & Differential " [[id number]]  (Abnormal) Collected: 11/06/23 0135    Specimen: Blood Updated: 11/06/23 0322    Narrative:      The following orders were created for panel order CBC & Differential.  Procedure                               Abnormality         Status                     ---------                               -----------         ------                     CBC Auto Differential[[id number]]        Abnormal            Final result               Scan Slide[[id number]]                                       Final result                 Please view results for these tests on the individual orders.    CBC Auto Differential [[id number]]  (Abnormal) Collected: 11/06/23 0135    Specimen: Blood Updated: 11/06/23 0322     WBC 32.20 10*3/mm3      RBC 3.11 10*6/mm3      Hemoglobin 8.4 g/dL      Hematocrit 27.3 %      MCV 87.8 fL      MCH 27.1 pg      MCHC 30.8 g/dL      RDW 15.0 %      RDW-SD 45.5 fl      MPV 9.4 fL      Platelets 343 10*3/mm3     Narrative:      The previously reported component NRBC is no longer being reported. Previous result was 0.1 /100 WBC (Reference Range: 0.0-0.2 /100 WBC) on 11/6/2023 at 0250 EST.    Scan Slide [[id number]] Collected: 11/06/23 0135    Specimen: Blood Updated: 11/06/23 0322     Scan Slide --     Comment: See Manual Differential Results       Manual Differential [[id number]]  (Abnormal) Collected: 11/06/23 0135    Specimen: Blood Updated: 11/06/23 0322     Neutrophil % 36.0 %      Lymphocyte % 62.0 %      Eosinophil % 2.0 %      Neutrophils Absolute 11.59 10*3/mm3      Lymphocytes Absolute 19.96 10*3/mm3      Eosinophils Absolute 0.64 10*3/mm3      Poikilocytes Slight/1+     Smudge Cells Mod/2+     Platelet Estimate Adequate     Large Platelets Mod/2+    Magnesium [[id number]]  (Normal) Collected: 11/06/23 0135    Specimen: Blood Updated: 11/06/23 0317     Magnesium 2.2 mg/dL     Comprehensive Metabolic Panel [[id number]]  (Abnormal) Collected: 11/06/23 0135    Specimen: Blood Updated: 11/06/23 0317      Glucose 120 mg/dL      BUN 32 mg/dL      Creatinine 1.00 mg/dL      Sodium 132 mmol/L      Potassium 3.9 mmol/L      Chloride 94 mmol/L      CO2 26.0 mmol/L      Calcium 9.1 mg/dL      Total Protein 6.5 g/dL      Albumin 3.2 g/dL      ALT (SGPT) 18 U/L      AST (SGOT) 22 U/L      Alkaline Phosphatase 153 U/L      Total Bilirubin 0.2 mg/dL      Globulin 3.3 gm/dL      A/G Ratio 1.0 g/dL      BUN/Creatinine Ratio 32.0     Anion Gap 12.0 mmol/L      eGFR 56.4 mL/min/1.73     Narrative:      GFR Normal >60  Chronic Kidney Disease <60  Kidney Failure <15    The GFR formula is only valid for adults with stable renal function between ages 18 and 70.    POC Glucose Once [[id number]]  (Normal) Collected: 11/05/23 2321    Specimen: Blood Updated: 11/05/23 2322     Glucose 105 mg/dL      Comment: Serial Number: [id number]:  [id number]       POC Glucose Once [[id number]]  (Abnormal) Collected: 11/05/23 2045    Specimen: Blood Updated: 11/05/23 2046     Glucose 135 mg/dL      Comment: Serial Number: [id number]:  [id number]       POC Glucose Once [[id number]]  (Abnormal) Collected: 11/05/23 1734    Specimen: Blood Updated: 11/05/23 1735     Glucose 118 mg/dL      Comment: Serial Number: [id number]:  [id number]       POC Glucose Once [[id number]]  (Abnormal) Collected: 11/05/23 1115    Specimen: Blood Updated: 11/05/23 1116     Glucose 171 mg/dL      Comment: Serial Number: [id number]:  [id number]               PENDING RESULTS:     IMAGING REVIEWED:    XR Chest 1 View    Result Date: 11/6/2023  Impression: 1. Mild right basilar airspace disease likely atelectasis. 2. Stable cardiomegaly. Electronically Signed: Pavan Amezquita MD  11/6/2023 7:13 AM EST  Workstation ID: [id number]    XR Chest 1 View    Result Date: 11/5/2023  Impression: 1. Cardiomegaly. 2. Elevation of the right hemidiaphragm with slightly improved right basilar subsegmental ectasis. 3. Questionable small left pleural effusion with atelectasis.  Electronically Signed: Jaciel Giraldo MD  11/5/2023 9:14 AM EST  Workstation ID: [id number]     Assessment & Plan     ASSESSMENT:    Chronic lymphocytic leukemia: First diagnosed at the end of 2022 and she has not needed treatment since the diagnosis.  Her condition is complicated at this time but the severity of her present illness.  The sudden marked increase in the white cell count is most likely not representative of the malignant disease but rather connected to the present illness. 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 She is now off antibiotics.  Counts remained stable  Normocytic anemia: Has experienced coffee-ground emesis and gastroenterology is following but patient is a poor candidate for sedation/EGD.  They plan for EGD only for life-threatening GI bleed at this time.Haptoglobin was elevated indicating low likelihood of hemolytic anemia.   On PPI/Carafate.  Hemoglobin is stable with no further concern for GI bleed  Acute respiratory failure with hypoxia and hypercapnia/ARDS hospital-acquired pneumonia fluid overload and pulmonary edema.  Septic shock due to UTI/E. coli and Klebsiella bacteremia ID is following. She has completed her course of antibiotics  Coffee-ground emesis: Not a candidate for EGD on twice daily PPI, Carafate tube feeds per primary. Hemoglobin remains stable.  NG tube feeds off.  Oral intake has also improved  History of stage II right breast cancer status post right mastectomy.  ER positive, IN positive, HER2/juan luis negative.  Continue Arimidex  Nutrition via tube feeds      PLAN:    Monitor CBC and transfuse for hemoglobin less than 7.0 g/dL  Daily CBCs  Serum IgG is 463  Leucocytosis is stable  Antibiotics per infectious disease.  IV Merrem completed 10/31/2023  Will continue to follow.  Anticipate discharge today              Electronically signed by Ling Recinos  MD Yuan, 11/10/23, 8:27 AM EST.

## 2023-11-06 NOTE — MBS/VFSS/FEES
Acute Care - Speech Language Pathology   Swallow Re-Evaluation  [place]     Patient Name: Diane Guan  : 1940  MRN: [id number]  Today's Date: 2023               Admit Date: 10/10/2023    Visit Dx:     ICD-10-CM ICD-9-CM   1. Hypoxemia  R09.02 799.02   2. Chronic respiratory failure with hypoxia  J96.11 518.83     799.02   3. Rhinovirus infection  B34.8 079.3   4. Dyspnea, unspecified type  R06.00 786.09   5. Cough, unspecified type  R05.9 786.2   6. Rib pain on right side  R07.81 786.50   7. Right low back pain, unspecified chronicity, unspecified whether sciatica present  M54.50 724.2   8. Acute respiratory failure with hypoxia  J96.01 518.81   9. Pneumonia of left lower lobe due to infectious organism  J18.9 486   10. Mucus plugging of bronchi  T17.500A 519.19   11. Acute hypoxemic respiratory failure  J96.01 518.81   12. Aspiration pneumonia due to vomit, unspecified laterality, unspecified part of lung  J69.0 507.0   13. Weakness  R53.1 780.79   14. Acute low back pain, unspecified back pain laterality, unspecified whether sciatica present  M54.50 724.2     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    Acquired hypothyroidism    Type 2 diabetes mellitus with stage 3b chronic kidney disease, without long-term current use of insulin    Insomnia    Postmenopausal status    Shoulder pain    Overactive bladder    Vitamin B 12 deficiency    Seasonal allergies    Absolute anemia    Vitamin D deficiency    LIBBY (obstructive sleep apnea)    Nocturnal hypoxia    Acute respiratory failure with hypoxia and hypercapnia    Abdominal pain    COPD (chronic obstructive pulmonary disease)    CKD  (chronic kidney disease) stage 3, GFR 30-59 ml/min    Sepsis due to pneumonia    Acute on chronic respiratory failure with hypoxemia    Class 1 obesity due to excess calories with serious comorbidity and body mass index (BMI) of 32.0 to 32.9 in adult    Acute hypoxemic respiratory failure    History of breast cancer    Personal history of CLL (chronic lymphocytic leukemia)    History of cigarette smoking    Fall at home    Rhinovirus infection    Acute respiratory failure    Hyperkalemia    Respiratory failure     Past Medical History:   Diagnosis Date    Acute bronchitis due to human metapneumovirus 02/08/2020    Anxiety disorder     Arthritis     Breast cancer 02/2019    Invasive ductal carcinoma    Chronic lymphocytic leukemia (CLL), B-cell 01/2019    Depression     Disease of thyroid gland     Diverticulosis of colon 2018    Identified on colonoscopy    Dysphagia 12/2020    Dyspnea on exertion     Fall at home 10/11/2023    Hemorrhoid     Hiatal hernia 12/2020    Hiatal hernia with GERD     large hiatal hernia with high-grade reflux on barium swallow; s/p laparoscopic fundoplication with gastropexy    History of breast cancer 10/11/2023    History of cigarette smoking 10/11/2023    History of diabetes mellitus     no meds now    Hyperlipidemia     Hypertension     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    aspiration pneumonia; completed abx therapy    OAB (overactive bladder)     Personal history of CLL (chronic lymphocytic leukemia) 10/11/2023    Skin cancer     Sleep apnea     daughter states pt does not have and doesn't have machine     Past Surgical History:   Procedure Laterality Date    BLADDER SURGERY      BREAST BIOPSY      BRONCHOSCOPY N/A 09/13/2019    Procedure: BRONCHOSCOPY with bronchial washing;  Surgeon: Marnie Frankel MD;  Location: [facility] ENDOSCOPY;  Service: Pulmonary    BRONCHOSCOPY Bilateral 10/18/2023    Procedure: BRONCHOSCOPY AT BEDSIDE;  Surgeon: Vinicius Heredia DO;  Location: [facility]  ENDOSCOPY;  Service: Pulmonary;  Laterality: Bilateral;    COLONOSCOPY  07/19/2018    severe diverticulosis    CYST REMOVAL      Removed a fatty cyst off of her back    ENDOSCOPY N/A 11/23/2020    Procedure: ESOPHAGOGASTRODUODENOSCOPY with dilatation (Bougie # 48, 50, 52, 54, 56, 58);  Surgeon: Den Plummer DO;  Location: [facility] ENDOSCOPY;  Service: General;  Laterality: N/A;  Post: large hiatal hernia, joshua's ulcers    HIATAL HERNIA REPAIR N/A 12/30/2020    Procedure: Laparoscopic hiatal hernia repair with gastropexy;  Surgeon: Den Plummer DO;  Location: [facility] MAIN OR;  Service: General;  Laterality: N/A;    MASTECTOMY Right 02/04/2019    Invasive ductal carcinoma    TUBAL ABDOMINAL LIGATION         SLP Recommendation and Plan  SLP Swallowing Diagnosis: moderate, oral dysphagia, pharyngeal dysphagia, suspected esophageal dysphagia (11/06/23 1200)  SLP Diet Recommendation: mechanical ground textures, no mixed consistencies, nectar thick liquids (11/06/23 1200)  Recommended Precautions and Strategies: upright posture during/after eating, small bites of food and sips of liquid, alternate between small bites of food and sips of liquid, check mouth frequently for oral residue/pocketing, hard swallow with each bite or sip, volitional throat clear, general aspiration precautions, assist with feeding (11/06/23 1200)  SLP Rec. for Method of Medication Administration: meds whole, meds crushed, with thick liquids, with puree, as tolerated (11/06/23 1200)     Monitor for Signs of Aspiration: yes, notify SLP if any concerns, cough, elevated WBC count, gurgly voice, throat clearing, fever, upper respiratory, pneumonia, right lower lobe infiltrates (11/06/23 1200)  Recommended Diagnostics: reassess via clinical swallow evaluation, reassess via VFSS (MBS) (11/06/23 1200)  Swallow Criteria for Skilled Therapeutic Interventions Met: demonstrates skilled criteria (11/06/23 1200)  Anticipated Discharge Disposition (SLP):  "anticipate therapy at next level of care (11/06/23 1200)  Rehab Potential/Prognosis, Swallowing: adequate, monitor progress closely, re-evaluate goals as necessary (11/06/23 1200)  Therapy Frequency (Swallow): 3 days per week, 4 days per week (11/06/23 1200)  Predicted Duration Therapy Intervention (Days): until discharge (11/06/23 1200)  Oral Care Recommendations: Oral Care BID/PRN, Oral Care before breakfast, after meals and PRN (11/06/23 1200)            Oral Care Recommendations: Oral Care BID/PRN, Oral Care before breakfast, after meals and PRN (11/06/23 1200)           SWALLOW EVALUATION (last 72 hours)       [facility] Adult Swallow Evaluation       Row Name 11/06/23 1200       Rehab Evaluation    Document Type evaluation  -SM    Subjective Information no complaints  -SM    Patient Observations alert;cooperative;agree to therapy  -SM    Patient/Family/Caregiver Comments/Observations No family or caregivers present for procedure  -SM    Patient Effort good  -SM    Comment The patient was seen today for a repeat instrumental assessment of swallow  -SM    Symptoms Noted During/After Treatment none  -SM       General Information    Patient Profile Reviewed yes  -SM    Pertinent History Of Current Problem Per H&P:  \"83 y.o. female with PMH of diabetes, hypertension, breast cancer presented to the hospital after a fall and was admitted with a principal diagnosis of Acute hypoxemic respiratory failure.  On admission, patient required 2 L nasal cannula and subsequently had worsening oxygenation with hypercapnic respiratory failure.  Failed BiPAP and subsequently was intubated on 10/15.  Initially treated with broad-spectrum antibiotics and ID was consulted.  RVP positive for rhinovirus, sputum cultures were negative.  Treated with diuretics for fluid overload.  Developed profound hypoxia and needed deep sedation.  Had bronch on 10/18.  Oxygenation improved with aggressive diuretics. Subsequently, extubated on 10/25 but " "developed hypoxia afterwards, failed BiPAP and had to be reintubated. \"     Most recent chest imaging revealed the following:  Narrative & Impression  XR CHEST 1 VW     Date of Exam: 11/6/2023 5:25 AM EST     Indication: Hypoxia     Comparison: 11/5/2023     Findings:  Stable cardiomegaly. Linear right basilar airspace disease likely atelectasis. Stable elevated right hemidiaphragm. Negative for pneumothorax. No pleural effusion. Osseous structures grossly intact.     IMPRESSION:  Impression:  1. Mild right basilar airspace disease likely atelectasis.  2. Stable cardiomegaly.     -SM    Current Method of Nutrition pureed;nectar/syrup-thick liquids  -SM    Precautions/Limitations, Vision WFL;for purposes of eval  -SM    Precautions/Limitations, Hearing hearing impairment, bilaterally  -SM    Prior Level of Function-Communication WFL  -SM    Prior Level of Function-Swallowing other (see comments)  Prior to this admission she was consuming a regular consistency diet with thin liquids  -SM    Plans/Goals Discussed with patient;other (see comments)  Nursing staff  -SM    Barriers to Rehab medically complex  -SM    Patient's Goals for Discharge return home  -SM       Pain Scale: Numbers Pre/Post-Treatment    Pretreatment Pain Rating 0/10 - no pain  -SM    Posttreatment Pain Rating 0/10 - no pain  -SM       Oral Motor Structure and Function    Dentition Assessment missing teeth;other (see comments)  It is reported that she has both upper and lower partial plates, however these were not present at the time of this assessment  -SM    Secretion Management WNL/WFL  -SM    Mucosal Quality dry  -SM       Oral Musculature and Cranial Nerve Assessment    Oral Motor, Comment Generalized weakness noted however no overt weakness or asymmetry evident. She is verbal and presents with good intelligibility.  -SM       General Eating/Swallowing Observations    Respiratory Support Currently in Use nasal cannula  -SM    O2 Liters 2L  -SM    " Eating/Swallowing Skills self-fed;other (see comments)  Assisted by SLP  -    Positioning During Eating upright 90 degree;upright in chair  -       MBS/VFSS    Utensils Used spoon;cup  -SM    Consistencies Trialed nectar/syrup-thick liquids;pureed;mechanical ground textures;thin liquids  -SM       MBS/VFSS Interpretation    VFSS Summary Video Swallow Study completed this date prior to discharge to home. The patient was seated up at a 90 degree angle and viewed in the lateral projection. The study was conducted in conjunction with the SLP and Radiology Tech.The patient is familiar to this department having had an initial video swallow during this admission on 11/2/2023 which revealed:    Video fluoroscopic swallow study conducted in the lateral position with pt seated upright. Pt was administered trials and self fed trials as provided by SLP respectively: thin liquid by spoon x 2, ntl by spoon x2, ntl by cup x1, ntl by straw x1, puree (applesauce) x 2, mechanical soft/mixed consistency (peaches) x 1 & ntl by straw x1.       Across all trials pt presents w/decreased anterior hyoid movement and epiglottic deflection. Pt noted w/deep penetration of thin liquid and mild, transient penetration on 1/5 trials of ntl. No penetration or aspiration w/puree. Mark aspiration of peaches from the pyriforms during the swallow. No remarkable cricopharyngeal or UES findings. See report for further details.     Recommend a puree and ntl diet, meds crushed in puree and the [place] Water Protocol (see below for guidelines).   Pt should be sitting up to 90 degree hip flexion for all PO.  Small bites and sips at slow rate.  ST to continue to follow for dysphagia tx.     Water Protocol:  The rationale to recommend water when a PO diet cannot appropriately/functionally sustain nutrition (or if thickened liquids are prescribed due to aspiration of thin liquids) is because water is low risk for aspiration pna when compared to aspiration  of food or other liquids.    Benefits of a water protocol include but are not limited to:      Oral gratification   Engagement of oropharyngeal swallow musculature   Decrease likelihood of dehydration       Guidelines for proper implementation include:  Waiting a minimum of 30 minutes after consuming any other PO   Thorough oral care prior to consuming water  Upright at 90 degree hip flexion  Small sips at slow rate  Monitor for any changes in respiratory status and discontinue if distress noted     The [place] Free Water Protocol is a research based protocol established in 1984.     A repeat instrumental assessment of swallow was recommended prior to discharge to home today.     The patient was seated up at a 90 degree angle and viewed in the lateral projection. She is currently on 2 L 02 via HF nasal canula for this procedure. The study was initiated with trials of NTL by cup x 2, puree (applesauce mixed with barium) x 2 trials, mechanical soft (peaches) x 2 trials, NTL by cup x 2 more trials, then followed by thin liquid by cup x 3 trials. She is able to hold the cup and drink both thin and NTL independently. SLP offered the pureed and mech soft items to the patient and she was able to hold the spoon and self feed these as well. Labial seal on spoon and cup are adequate. She presents with slow, however functional oral transit and bolus control of the mechanical soft items. Slight premature spillage of the puree consistency noted, with bolus head falling over the base of the tongue approaching the valleculae. With thin liquids the bolus head does reach the level of the pyriform sinus before the swallow is initiated. Swallow delay is approximately 4-5 seconds in duration. Upon initiation of the swallow she presents with weak/reduced base of tongue retraction, decreased laryngeal elevation and incomplete laryngeal vestibular closure. This results in transient/deep, silent penetration of thin liquids (which would  equate to 3 - 5 on the [place] Penetration-Aspiration Scale). She is cued to cough/throat clear and make a dry swallow, which does aid in clearing some of the penetrated material. Thin liquids also noted to have increased vallecular and pyriform sinus residue after the swallow, which places her at risk of penetration/aspiration. An esophageal scan was completed at the end of this procedure and revealed mild, distal esophageal retention with no retrograde flow. After the procedure was terminated (following the last sip of thin liquid) she presented with much coughing/choking following.     RECOMMENDATIONS:  It is recommended that the patient's diet be upgraded to mechanical soft and continue with NTL at this time. She should be up at a full 90 degree angle for all meals/po. Recommend continuing NTL upon discharge to home and follow up dysphagia therapy via home health services. Would recommend a follow up instrumental assessment of swallow in approximately 4 - 6 weeks after continued skilled dysphagia therapy to assess for progress and/or potential further diet upgrade. She may continue to utilize the [place] Water Protocol in between meals (see above/previous VFSS results/recs).       -SM       Esophageal Phase    Esophageal Phase other (see comments)  Mild distal retention  -       SLP Evaluation Clinical Impression    SLP Swallowing Diagnosis moderate;oral dysphagia;pharyngeal dysphagia;suspected esophageal dysphagia  -    Functional Impact risk of aspiration/pneumonia;risk of malnutrition;risk of dehydration  -    Rehab Potential/Prognosis, Swallowing adequate, monitor progress closely;re-evaluate goals as necessary  -    Swallow Criteria for Skilled Therapeutic Interventions Met demonstrates skilled criteria  -       Recommendations    Therapy Frequency (Swallow) 3 days per week;4 days per week  -    Predicted Duration Therapy Intervention (Days) until discharge  -    SLP Diet Recommendation  mechanical ground textures;no mixed consistencies;nectar thick liquids  -    Recommended Diagnostics reassess via clinical swallow evaluation;reassess via VFSS ([facility])  -    Recommended Precautions and Strategies upright posture during/after eating;small bites of food and sips of liquid;alternate between small bites of food and sips of liquid;check mouth frequently for oral residue/pocketing;hard swallow with each bite or sip;volitional throat clear;general aspiration precautions;assist with feeding  -    Oral Care Recommendations Oral Care BID/PRN;Oral Care before breakfast, after meals and PRN  -    SLP Rec. for Method of Medication Administration meds whole;meds crushed;with thick liquids;with puree;as tolerated  -    Monitor for Signs of Aspiration yes;notify SLP if any concerns;cough;elevated WBC count;gurgly voice;throat clearing;fever;upper respiratory;pneumonia;right lower lobe infiltrates  -    Anticipated Discharge Disposition (SLP) anticipate therapy at next level of care  -       Swallow Goals (SLP)    Swallow LTGs Swallow Long Term Goal (free text)  -    Swallow STGs diet tolerance goal selection (SLP);pharyngeal strengthening exercise goal selection (SLP);swallow management recall goal selection (SLP)  -    Diet Tolerance Goal Selection (SLP) Swallow Short Term Goal 1;Swallow Short Term Goal 2  -    Pharyngeal Strengthening Exercise Goal Selection (SLP) pharyngeal strengthening exercise, SLP goal 1  -    Swallow Management Recall Goal Selection (SLP) swallow management recall, SLP goal 1  -       (LTG) Swallow    (LTG) Swallow pt will improve functional outcome measure score from FOIS LEVEL II (nothing by mouth, minimal attempts at PO) to FOIS LEVEL V or higher (total oral intake of multiple consistencies, requiring special preparation or compensations)  -    [place] (Swallow Long Term Goal) with 1:1 assist/ supervision  -    Time Frame (Swallow Long Term Goal) by  discharge  -SM    Progress/Outcomes (Swallow Long Term Goal) goal ongoing  -SM    Comment (Swallow Long Term Goal) It is recommended that the patient's diet be upgraded to mechanical soft and continue with NTL at this time. She should be up at a full 90 degree angle for all meals/po. Recommend continuing NTL upon discharge to home and follow up dysphagia therapy via home health services. Would recommend a follow up instrumental assessment of swallow in approximately 4 - 6 weeks after continued skilled dysphagia therapy to assess for progress and/or potential further diet upgrade.  -SM       (STG) Swallow 1    (STG) Swallow 1 The patient will participate in a meal/follow-up assessment to determine safety and adequacy of recommended diet, independent use of safe swallow compensations, pt/family education and additional goals/recommendations to follow  -SM    [place] (Swallow Short Term Goal 1) independently (over 90% accuracy)  -SM    Time Frame (Swallow Short Term Goal 1) 1 week  -SM    Progress/Outcomes (Swallow Short Term Goal 1) goal ongoing  -SM    Comment (Swallow Short Term Goal 1) Recommend follow up meal assessment with recommended diet upgrade if patient does not discharge today.  -SM       (STG) Swallow 2    (STG) Swallow 2 Patient/caregiver teaching, addtional goals to follow  -SM    [place] (Swallow Short Term Goal 2) with maximum cues (25-49% accuracy)  -SM    Time Frame (Swallow Short Term Goal 2) 1 week  -SM    Progress/Outcomes (Swallow Short Term Goal 2) new goal  -SM    Comment (Swallow Short Term Goal 2) Discussed results/recommendations with the patient and relayed to nursing staff.  -SM       (STG) Pharyngeal Strengthening Exercise Goal 1 (SLP)    Activity (Pharyngeal Strengthening Goal 1, SLP) increase epiglottic inversion and retroflexion;increase closure at entrance to airway/closure of airway at glottis  -SM    Increase Epiglottic Inversion and Retroflexion Mendelsohn;falsetto;hard  effortful swallow  -SM    Increase Closure at Entrance to Airway/Closure of Airway at Glottis supraglottic swallow;falsetto;hard effortful swallow  -SM    [place]/Accuracy (Pharyngeal Strengthening Goal 1, SLP) with maximum cues (25-49% accuracy)  -SM    Time Frame (Pharyngeal Strengthening Goal 1, SLP) short term goal (STG)  -SM    Progress/Outcomes (Pharyngeal Strengthening Goal 1, SLP) goal ongoing  -SM    Comment (Pharyngeal Strengthening Goal 1, SLP) Did not address this date. Repeat instrumental assessment of swallow conducted.  -SM       (STG) Swallow Management Recall Goal 1 (SLP)    Activity (Swallow Management Recall Goal 1, SLP) independent recall of;aspiration precautions;oral care recommendations;safe diet/liquid level;safe diet level/texture  -SM    [place]/Accuracy (Swallow Management Recall Goal 1, SLP) with maximum cues (25-49% accuracy)  -SM    Time Frame (Swallow Management Recall Goal 1, SLP) short term goal (STG)  -SM    Progress/Outcomes (Swallow Management Recall Goal 1, SLP) goal ongoing  -SM              User Key  (r) = Recorded By, (t) = Taken By, (c) = Cosigned By      Initials Name Effective Dates    CP Shanna Brownlee, SLP 06/16/21 -     SM April Barnes, HARJIT 06/16/21 -                     EDUCATION  The patient has been educated in the following areas:   Dysphagia (Swallowing Impairment) Modified Diet Instruction.        SLP GOALS       Row Name 11/06/23 1200 11/03/23 1500       (LTG) Swallow    (LTG) Swallow pt will improve functional outcome measure score from FOIS LEVEL II (nothing by mouth, minimal attempts at PO) to FOIS LEVEL V or higher (total oral intake of multiple consistencies, requiring special preparation or compensations)  -SM pt will improve functional outcome measure score from FOIS LEVEL II (nothing by mouth, minimal attempts at PO) to FOIS LEVEL V or higher (total oral intake of multiple consistencies, requiring special preparation or compensations)  -CP     [place] (Swallow Long Term Goal) with 1:1 assist/ supervision  -SM with 1:1 assist/ supervision  -CP    Time Frame (Swallow Long Term Goal) by discharge  -SM by discharge  -CP    Progress/Outcomes (Swallow Long Term Goal) goal ongoing  -SM goal ongoing  -CP    Comment (Swallow Long Term Goal) It is recommended that the patient's diet be upgraded to mechanical soft and continue with NTL at this time. She should be up at a full 90 degree angle for all meals/po. Recommend continuing NTL upon discharge to home and follow up dysphagia therapy via home health services. Would recommend a follow up instrumental assessment of swallow in approximately 4 - 6 weeks after continued skilled dysphagia therapy to assess for progress and/or potential further diet upgrade.  -SM see above note  -CP       (STG) Swallow 1    (STG) Swallow 1 The patient will participate in a meal/follow-up assessment to determine safety and adequacy of recommended diet, independent use of safe swallow compensations, pt/family education and additional goals/recommendations to follow  -SM The patient will participate in a meal/follow-up assessment to determine safety and adequacy of recommended diet, independent use of safe swallow compensations, pt/family education and additional goals/recommendations to follow  -CP    [place] (Swallow Short Term Goal 1) independently (over 90% accuracy)  -SM --    Time Frame (Swallow Short Term Goal 1) 1 week  -SM 1 week  -CP    Progress/Outcomes (Swallow Short Term Goal 1) goal ongoing  -SM goal ongoing  -CP    Comment (Swallow Short Term Goal 1) Recommend follow up meal assessment with recommended diet upgrade if patient does not discharge today.  -SM See above note  -CP       (STG) Swallow 2    (STG) Swallow 2 Patient/caregiver teaching, addtional goals to follow  -SM --    [place] (Swallow Short Term Goal 2) with maximum cues (25-49% accuracy)  -SM --    Time Frame (Swallow Short Term Goal 2) 1 week   -SM --    Progress/Outcomes (Swallow Short Term Goal 2) new goal  -SM --    Comment (Swallow Short Term Goal 2) Discussed results/recommendations with the patient and relayed to nursing staff.  -SM --       (STG) Pharyngeal Strengthening Exercise Goal 1 (SLP)    Activity (Pharyngeal Strengthening Goal 1, SLP) increase epiglottic inversion and retroflexion;increase closure at entrance to airway/closure of airway at glottis  -SM increase epiglottic inversion and retroflexion;increase closure at entrance to airway/closure of airway at glottis  -CP    Increase Epiglottic Inversion and Retroflexion Mendelsohn;falsetto;hard effortful swallow  -SM Mendelsohn;falsetto;hard effortful swallow  -CP    Increase Closure at Entrance to Airway/Closure of Airway at Glottis supraglottic swallow;falsetto;hard effortful swallow  -SM supraglottic swallow;falsetto;hard effortful swallow  -CP    [place]/Accuracy (Pharyngeal Strengthening Goal 1, SLP) with maximum cues (25-49% accuracy)  -SM with maximum cues (25-49% accuracy)  -CP    Time Frame (Pharyngeal Strengthening Goal 1, SLP) short term goal (STG)  -SM short term goal (STG)  -CP    Progress/Outcomes (Pharyngeal Strengthening Goal 1, SLP) goal ongoing  -SM goal ongoing  -CP    Comment (Pharyngeal Strengthening Goal 1, SLP) Did not address this date. Repeat instrumental assessment of swallow conducted.  -SM --       (STG) Swallow Management Recall Goal 1 (SLP)    Activity (Swallow Management Recall Goal 1, SLP) independent recall of;aspiration precautions;oral care recommendations;safe diet/liquid level;safe diet level/texture  -SM independent recall of;aspiration precautions;oral care recommendations;safe diet/liquid level;safe diet level/texture  -CP    [place]/Accuracy (Swallow Management Recall Goal 1, SLP) with maximum cues (25-49% accuracy)  -SM with maximum cues (25-49% accuracy)  -CP    Time Frame (Swallow Management Recall Goal 1, SLP) short term goal (STG)  -SM  short term goal (STG)  -CP    Progress/Outcomes (Swallow Management Recall Goal 1, SLP) goal ongoing  -SM goal ongoing  -CP              User Key  (r) = Recorded By, (t) = Taken By, (c) = Cosigned By      Initials Name Provider Type    CP Shanna Brownlee, SLP Speech and Language Pathologist    April Rainey, SLP Speech and Language Pathologist                       Time Calculation:                HARJIT Johnston  11/6/2023

## 2023-11-07 ENCOUNTER — HOME CARE VISIT (OUTPATIENT)
Dept: HOME HEALTH SERVICES | Facility: HOME HEALTHCARE | Age: 83
End: 2023-11-07
Payer: MEDICARE

## 2023-11-07 ENCOUNTER — TRANSITIONAL CARE MANAGEMENT TELEPHONE ENCOUNTER (OUTPATIENT)
Dept: CALL CENTER | Facility: HOSPITAL | Age: 83
End: 2023-11-07
Payer: MEDICARE

## 2023-11-07 ENCOUNTER — TELEPHONE (OUTPATIENT)
Dept: FAMILY MEDICINE CLINIC | Facility: CLINIC | Age: 83
End: 2023-11-07
Payer: MEDICARE

## 2023-11-07 VITALS
DIASTOLIC BLOOD PRESSURE: 64 MMHG | RESPIRATION RATE: 16 BRPM | OXYGEN SATURATION: 90 % | HEART RATE: 76 BPM | TEMPERATURE: 98.4 F | SYSTOLIC BLOOD PRESSURE: 128 MMHG

## 2023-11-07 PROCEDURE — G0299 HHS/HOSPICE OF RN EA 15 MIN: HCPCS

## 2023-11-07 NOTE — HOME HEALTH
SOC Note: Patient is 83 year old who was admitted to [facility] on 10/10/23 originally for a fall. Family reports she quickly developed respiratory failure and sepsis related to UTI / Pneumonia. She required 2 liters of oxygen at admission and had worsening symptoms that required intubation for  hypercapnic respiratory failure. Patient failed BI-PAP and required re-intubation on 10/15. She had a bronch on 10/18 and was treated with IV Meropenem and diuretics for fluid overload.    At todays admission visit  Patient appears very frail. Sleeping on SN arrival and requested her daughter be allowed to sign the consent forms for her. Reviewed all  teaching information and medications with daughter Marianna, who is managing medications. On initial assessment patient was sleeping with mouth opened and sat's dipped down to 72. Marianna assisted SN in repositioning patient with HOB elevated and Sats returned to patients norm of 88 - 92%. After repositioning patient started falling back to sleep and it was noted that when patient sleeps with her mouth opened and is mouth breathing and possibly not getting the benefit of the oxygen via nasal canula. Patient is a very shallow breather and not moving alot of air. instructed in incentive spirometry and patient was able to move 1500 cc of air 2 times not consistently. Patient and daughter were encouraged to perform 10 deep breathing excercises 4 - 6 times daily. Encouraged patient and daughter to monitor oxygen sats and call 911 to return to hospital if sats drop below 88% for any length of time.    Call placed to PCP Asia Queen APRN to alert to low oxygen and reqest order for Mask be sent to DME provider. Spoke with Trish who is working to fax order to [employer] DME provider.     Home Health ordered for: disciplines SN 2wk1, 3wk1, 2wk1, 1wk4, OT 1wk1 eval, PT 1wk1 eval    Reason for Hosp/Primary Dx/Co-morbidities: Acute hypoxic respiratory failure, Hyperkalemia, CKD stage 3, COPD,  "Hypothyroidism, Type 2 diabetes, with history of breast cancer, chronic lymphoctic leukemia    Focus of Care: Chronic respiratory failure with hypoxia     Patient's goal(s):\"To be able to move and walk without becoming short of air\"    Current Functional status/mobility/DME: Patient is very drowsy. Wakes for brief moments and answer questions appropriately.      HB status/Living Arrangements: Patient is currently staying in her daughters home    Skin Integrity/wound status: no current wounds, but will need ongoing monitoring of skin due to immobility    Code Status: Patient and family have requested DNR    Fall Risk/Safety concerns: Patient is a high risk of falls     Medication issues/Concerns:Patient has all currently ordered medications in home    Additional Problems/Concerns: Would hospice be appropriate?    SDOH Barriers (i.e. caregiver concerns, social isolation, transportation, food insecurity, environment, income etc.)/Need for MSW: none    Plan for next visit: cardiopulmonary assessment, family teaching related to shortness of breath and positioning, skin assessment, assess activity tolerance and oxygenation"

## 2023-11-07 NOTE — Clinical Note
If possible, it would be better if I could go on Friday instead of Thursday for the sake of my schedule.   ----- Message -----  From: Paulina Narvaez RN  Sent: 11/7/2023   8:33 PM EST  To: Shalonda Midkiff, RegSched Rep; *      I see that PT has moved from tomorrow to Thursday. Please move up my visit from Friday to tomorrow as patient needs someone to lay eyes on her tomorrow. She is very fragile and in danger of needing to return to the hospital. I request PT move to Friday so she only has one discipline each day and family is expecting OT on Thursday. Not sure how much therapy patient will tolerate, but she needs it

## 2023-11-07 NOTE — TELEPHONE ENCOUNTER
Paulina with [facility] ([phone])called to request an order for an oxygen face mask for the patient. She states the patient uses [employer] [facility] for equipment. She said while with the patient she fell asleep and her O2 dropped to the 70s. She was able to reposition her and get it to come up to the 80s- low 90s. She states she currently sleeps in a nasal cannula and sleeps with her mouth open so she thinks she will be better controlled in a face mask.

## 2023-11-07 NOTE — Clinical Note
I see that PT has moved from tomorrow to Thursday. Please move up my visit from Friday to tomorrow as patient needs someone to lay eyes on her tomorrow. She is very fragile and in danger of needing to return to the hospital. I request PT move to Friday so she only has one discipline each day and family is expecting OT on Thursday. Not sure how much therapy patient will tolerate, but she needs it

## 2023-11-07 NOTE — OUTREACH NOTE
Call Center TCM Note      Flowsheet Row Responses   [facility] patient discharged from? [facility]   Does the patient have one of the following disease processes/diagnoses(primary or secondary)? Sepsis   TCM attempt successful? Yes   Call start time 1145   Call end time 1159   Discharge diagnosis ARF due to pneumonia/Sepsis due to UTI/Pneumonia   Person spoke with today (if not patient) and relationship spouse, after attempting to call daughter. with no answer.   Meds reviewed with patient/caregiver? Yes   Is the patient having any side effects they believe may be caused by any medication additions or changes? No   Does the patient have all medications related to this admission filled (includes all antibiotics, inhalers, nebulizers,steroids,etc.) Yes   Is the patient taking all medications as directed (includes completed medication regime)? Yes   Comments I spoke with . Patient is staying with daughter. I attempted to call her and no answer. Unable to schedule followup appt.   Does the patient have an appointment with their PCP within 7-14 days of discharge? No   What is the Home health agency?  [facility]   What DME was ordered? home O2 delivered by [employer]   Psychosocial issues? No   Did the patient receive a copy of their discharge instructions? Yes   Nursing interventions Reviewed instructions with patient   What is the patient's perception of their health status since discharge? Improving   Is patient/caregiver able to teach back steps to recovery at home? Rest and regain strength, Set small, achievable goals for return to baseline health   If the patient is a current smoker, are they able to teach back resources for cessation? Not a smoker   Is the patient/caregiver able to teach back the hierarchy of who to call/visit for symptoms/problems? PCP, Specialist, Home health nurse, Urgent Care, ED, 911 Yes   TCM call completed? Yes   Wrap up additional comments No needs voiced by    Call end  time 1159   Would this patient benefit from a Referral to [facility] Social Work? No   Is the patient interested in additional calls from an ambulatory ? No            Mauri Spivey RN    11/7/2023, 11:59 EST

## 2023-11-08 ENCOUNTER — HOME CARE VISIT (OUTPATIENT)
Dept: HOME HEALTH SERVICES | Facility: HOME HEALTHCARE | Age: 83
End: 2023-11-08
Payer: MEDICARE

## 2023-11-08 VITALS
TEMPERATURE: 98.9 F | OXYGEN SATURATION: 91 % | DIASTOLIC BLOOD PRESSURE: 50 MMHG | SYSTOLIC BLOOD PRESSURE: 138 MMHG | HEART RATE: 72 BPM

## 2023-11-08 DIAGNOSIS — J41.0 SIMPLE CHRONIC BRONCHITIS: Primary | ICD-10-CM

## 2023-11-08 LAB
MYCOBACTERIUM SPEC CULT: NORMAL
NIGHT BLUE STAIN TISS: NORMAL

## 2023-11-08 PROCEDURE — G0299 HHS/HOSPICE OF RN EA 15 MIN: HCPCS

## 2023-11-08 RX ORDER — IPRATROPIUM BROMIDE AND ALBUTEROL SULFATE 2.5; .5 MG/3ML; MG/3ML
3 SOLUTION RESPIRATORY (INHALATION) EVERY 4 HOURS PRN
Qty: 360 ML | Refills: 1 | Status: SHIPPED | OUTPATIENT
Start: 2023-11-08

## 2023-11-08 NOTE — Clinical Note
Dominick, please verify family has  nebulizer machine and medication we got ordered today and encourage patient to use this as often as Dr has ordered it right now.

## 2023-11-08 NOTE — Clinical Note
Pt's daughter said they were picking up today.  Didn't have it when I was there    ----- Message -----  From: Paulina Narvaez RN  Sent: 11/8/2023   6:07 PM EST  To: Ramona Albright, OT; Dominick Jackson, PT      Dominick, please verify family has  nebulizer machine and medication we got ordered today and encourage patient to use this as often as Dr has ordered it right now.

## 2023-11-08 NOTE — Clinical Note
Please add Harmony's phone number [phone] as the first number to call for scheduling this is the daughter patient is staying with. If you can not reach her Marianna is the second number to call at [phone]

## 2023-11-08 NOTE — HOME HEALTH
Routine Visit Note: dvedaughter reports patient had just eaten a good meal and was falling back to sleep as SN arrived. Daughter states she has been spot checking patients oxygen since yesterday and it has not fallen as low as yesterday. Staying primarily between 88 - 92. Demonstrated percussion technique along with deep breathing excercises for daughter Ashley and oxygen saturations came up to 97 percent for a period of time. Teaching regarding request that has been made to physician for nebulizer medication. Pat reports patient has nebulizer machine at her home but she has refused to use it. Stressed to patient need to use nebulizer machine right now and she agreed that is her Dr orders it she will use it.    Skill/education provided: Cardiopulmonary assessment, falls assessment, pain assessment, medication review and teaching, blood sugar assessment, reminder of importance of deep breathing excercises, taught other daughter Pat percussion techniques.     Patient/caregiver response: Pat reports she sees the value in deep breathing and percussion to increase patients oxygen levels and she states she will make her do this periodically throughout the day.    Plan for next visit: Cardiopulmonary assessment, assess usage of nebulizer, falls assessment, pain assessment, medication review and teaching, blood sugar assessment    Other pertinent info: physician was to order nebulizer and face mask on 11/8/23

## 2023-11-09 ENCOUNTER — HOME CARE VISIT (OUTPATIENT)
Dept: HOME HEALTH SERVICES | Facility: HOME HEALTHCARE | Age: 83
End: 2023-11-09
Payer: MEDICARE

## 2023-11-09 VITALS
RESPIRATION RATE: 18 BRPM | SYSTOLIC BLOOD PRESSURE: 120 MMHG | HEART RATE: 68 BPM | TEMPERATURE: 97.8 F | DIASTOLIC BLOOD PRESSURE: 45 MMHG | OXYGEN SATURATION: 91 %

## 2023-11-09 PROCEDURE — G0151 HHCP-SERV OF PT,EA 15 MIN: HCPCS

## 2023-11-09 NOTE — HOME HEALTH
"Assessment/Referral:  Pt is an 84 y/o female pt of [facility].  Pt hospitalized for respiratory failure and returned home Monday 11/06/23 with supplemental O2 continuous at 2L.  Pt reports she was not previously compliant with her nebulizer due to feeling \"shaky\" but pt's daughter states she is going to assist with being more compliant after recent hospitalization.  Pt agreeable to PT evaluation of mobility despite fatigue.  PT noted pt to feel dizziness when sitting at edge of bed.  PT encouraged pt to sit EOB hourly during waking hours to build tolerance to upright position and reduce risk of orthostatic hypotension.  PT then assist pt to stand with wheeled walker and with side steps to head of bed.  Pt unable to tolerate ambulation away from edge of bed at this time but was able to advance feet with side stepping when provided CGA from PT for safety.  Pt's daughter present throughout PT evaluation and states she will assist pt daily with sitting edge of bed, use of wheeled walker for transfers and daily completion of supine HEP provided on handout by PT.  Pt will benefit from continued skilled home care PT for strengthening, gait/balance training to tolerance, transfer training and progression of mobility    Primary Focus of Care:  impaired mobility and weakness related to respiratory failure    PLOF/Environment:  Pt was previously independent with all mobility and self care without AD.  Pt currently living with her daughter in home with no steps to enter and using hospital bed.    Pt's Personal Goal:  Pt reports her primary goal is to \"just get better\"    Homebound reason(s):  Pt is homebound due to considerable taxing effort to leave home including:  falls risk, limited upright tolerance, pain, weakness, dizziness    Plan for Next Visit:  strengthening, mobility to tolerance, sitting balance, bed mobility"

## 2023-11-10 ENCOUNTER — HOME CARE VISIT (OUTPATIENT)
Dept: HOME HEALTH SERVICES | Facility: HOME HEALTHCARE | Age: 83
End: 2023-11-10
Payer: MEDICARE

## 2023-11-10 DIAGNOSIS — E11.65 TYPE 2 DIABETES MELLITUS WITH HYPERGLYCEMIA, WITHOUT LONG-TERM CURRENT USE OF INSULIN: Primary | ICD-10-CM

## 2023-11-10 PROCEDURE — G0152 HHCP-SERV OF OT,EA 15 MIN: HCPCS

## 2023-11-10 RX ORDER — BLOOD-GLUCOSE METER
1 EACH MISCELLANEOUS DAILY
Qty: 1 KIT | Refills: 0 | Status: SHIPPED | OUTPATIENT
Start: 2023-11-10

## 2023-11-10 RX ORDER — BLOOD SUGAR DIAGNOSTIC
1 STRIP MISCELLANEOUS DAILY
Qty: 100 EACH | Refills: 0 | Status: SHIPPED | OUTPATIENT
Start: 2023-11-10

## 2023-11-10 RX ORDER — LANCETS
1 EACH MISCELLANEOUS DAILY
Qty: 100 EACH | Refills: 0 | Status: SHIPPED | OUTPATIENT
Start: 2023-11-10

## 2023-11-12 VITALS
TEMPERATURE: 98.3 F | DIASTOLIC BLOOD PRESSURE: 60 MMHG | HEART RATE: 83 BPM | SYSTOLIC BLOOD PRESSURE: 118 MMHG | OXYGEN SATURATION: 97 %

## 2023-11-13 ENCOUNTER — HOME CARE VISIT (OUTPATIENT)
Dept: HOME HEALTH SERVICES | Facility: HOME HEALTHCARE | Age: 83
End: 2023-11-13
Payer: MEDICARE

## 2023-11-13 VITALS
TEMPERATURE: 97.3 F | RESPIRATION RATE: 17 BRPM | HEART RATE: 98 BPM | OXYGEN SATURATION: 92 % | DIASTOLIC BLOOD PRESSURE: 56 MMHG | SYSTOLIC BLOOD PRESSURE: 128 MMHG

## 2023-11-13 PROCEDURE — G0299 HHS/HOSPICE OF RN EA 15 MIN: HCPCS

## 2023-11-13 NOTE — HOME HEALTH
Pt is a 82 yo female who is currently staying with family members.  Pt recently hospitalized secondary to respiratory failure      PLOF Pt independent with all ADLs    Currently pt independent with feeding. .  Pt requires Max to total assist with all other self care tasks.   Total assist with IADLs      Skilled OT for ther ex transfers and adl training.   Pt and therapist in agreement with goals and poc.      Next visit ther ex     Pt denies any falls or med changes

## 2023-11-13 NOTE — HOME HEALTH
Routine Visit Note:Patient appears to be feeling much better today. She appears alert and oriented and was able to sit up on the side of her bed for a brief conversation.    Skill/education provided: Cardiopulmonary assessment, wound assessment and care, falls assessment, pain assessment, medication review and teaching, blood sugar assessment    Patient/caregiver response: Patient reports she has been using her nebulizer once a day and was encouraged to increase the frequency of the nebulizer for a short period to optimize her lung expansion. Patient reports she will try to do this 2 or 3 times a day.    Plan for next visit: Cardiopulmonary assessment, wound assessment and care, falls assessment, pain assessment, medication review and teaching, blood sugar assessment    Other pertinent info: Pat reports her sister periodically checks blood sugars and while she can not remember what they were she knows they have been good.

## 2023-11-14 ENCOUNTER — HOME CARE VISIT (OUTPATIENT)
Dept: HOME HEALTH SERVICES | Facility: HOME HEALTHCARE | Age: 83
End: 2023-11-14
Payer: MEDICARE

## 2023-11-14 PROCEDURE — G0157 HHC PT ASSISTANT EA 15: HCPCS

## 2023-11-15 ENCOUNTER — HOME CARE VISIT (OUTPATIENT)
Dept: HOME HEALTH SERVICES | Facility: HOME HEALTHCARE | Age: 83
End: 2023-11-15
Payer: MEDICARE

## 2023-11-15 ENCOUNTER — READMISSION MANAGEMENT (OUTPATIENT)
Dept: CALL CENTER | Facility: HOSPITAL | Age: 83
End: 2023-11-15
Payer: MEDICARE

## 2023-11-15 VITALS
RESPIRATION RATE: 19 BRPM | DIASTOLIC BLOOD PRESSURE: 64 MMHG | TEMPERATURE: 97.6 F | OXYGEN SATURATION: 97 % | HEART RATE: 74 BPM | SYSTOLIC BLOOD PRESSURE: 140 MMHG

## 2023-11-15 VITALS
DIASTOLIC BLOOD PRESSURE: 82 MMHG | TEMPERATURE: 97.7 F | OXYGEN SATURATION: 98 % | SYSTOLIC BLOOD PRESSURE: 132 MMHG | HEART RATE: 93 BPM

## 2023-11-15 LAB
MYCOBACTERIUM SPEC CULT: NORMAL
NIGHT BLUE STAIN TISS: NORMAL

## 2023-11-15 PROCEDURE — G0299 HHS/HOSPICE OF RN EA 15 MIN: HCPCS

## 2023-11-15 NOTE — OUTREACH NOTE
Sepsis Week 2 Survey      Flowsheet Row Responses   [facility] patient discharged from? [facility]   Does the patient have one of the following disease processes/diagnoses(primary or secondary)? Sepsis   Week 2 attempt successful? Yes   Call start time 1025   Call end time 1027   Discharge diagnosis ARF due to pneumonia/Sepsis due to UTI/Pneumonia   Is patient permission given to speak with other caregiver? Yes   List who call center can speak with Harmony daughter   Person spoke with today (if not patient) and relationship Harmony daughter   Meds reviewed with patient/caregiver? Yes   Is the patient taking all medications as directed (includes completed medication regime)? Yes   Does the patient have a primary care provider?  Yes   Has the patient kept scheduled appointments due by today? --  [daughter unsure of apts]   What is the Home health agency?  [facility]   Has home health visited the patient within 72 hours of discharge? Yes   Home health comments HH still visits   What DME was ordered? home O2 delivered by [employer]   Has all DME been delivered? Yes   DME comments wears 2L O2 at all times-oxygen levels staying in the 90's   What is the patient's perception of their health status since discharge? Improving   Is the patient/caregiver able to teach back TIME? T emperature - higher or lower than normal, I nfection - may have signs and symptoms of an infection   Is patient/caregiver able to teach back steps to recovery at home? Set small, achievable goals for return to baseline health, Rest and regain strength   Is the patient/caregiver able to teach back signs and symptoms of worsening condition: Rapid heart rate (>90), Fever, Shortness of breath/rapid respiratory rate   Week 2 call completed? Yes   Graduated Yes   Graduated/Revoked comments Pt improving per daughter   Call end time 1027            Ping H - Registered Nurse

## 2023-11-15 NOTE — HOME HEALTH
Routine Visit Note: Patient reports she is not feeling well this morning and has been feeling nauseated. Patient reports she has not eaten breakfast this morning but has taken medications. WE discussed probablity that she has taken her medications and not eaten may be causing some of the issue and patient verbalizes agreement. Patient states she is going to get something to eat as soon as SN leaves    Skill/education provided: Cardiopulmonary assessment, wound assessment and care, falls assessment, pain assessment, medication review and teaching, blood sugar assessment    Patient/caregiver response: Patient reports overall she has been feeling much better and is less short of breath. She is pleased with the progress she is making    Plan for next visit: Cardiopulmonary assessment, wound assessment and care, falls assessmenty, pain assessment, medication review and teaching, blood sugar assessment    Other pertinent info: No medication changes

## 2023-11-16 ENCOUNTER — HOME CARE VISIT (OUTPATIENT)
Dept: HOME HEALTH SERVICES | Facility: HOME HEALTHCARE | Age: 83
End: 2023-11-16
Payer: MEDICARE

## 2023-11-16 VITALS
DIASTOLIC BLOOD PRESSURE: 68 MMHG | SYSTOLIC BLOOD PRESSURE: 136 MMHG | HEART RATE: 71 BPM | OXYGEN SATURATION: 95 % | TEMPERATURE: 97.6 F

## 2023-11-16 LAB — FUNGUS WND CULT: ABNORMAL

## 2023-11-16 PROCEDURE — G0152 HHCP-SERV OF OT,EA 15 MIN: HCPCS

## 2023-11-16 PROCEDURE — G0157 HHC PT ASSISTANT EA 15: HCPCS

## 2023-11-17 ENCOUNTER — HOME CARE VISIT (OUTPATIENT)
Dept: HOME HEALTH SERVICES | Facility: HOME HEALTHCARE | Age: 83
End: 2023-11-17
Payer: MEDICARE

## 2023-11-17 VITALS
OXYGEN SATURATION: 96 % | HEART RATE: 86 BPM | TEMPERATURE: 97.7 F | SYSTOLIC BLOOD PRESSURE: 132 MMHG | DIASTOLIC BLOOD PRESSURE: 80 MMHG

## 2023-11-17 VITALS
OXYGEN SATURATION: 98 % | SYSTOLIC BLOOD PRESSURE: 130 MMHG | RESPIRATION RATE: 17 BRPM | DIASTOLIC BLOOD PRESSURE: 62 MMHG | HEART RATE: 76 BPM | TEMPERATURE: 98.1 F

## 2023-11-17 DIAGNOSIS — F41.8 MIXED ANXIETY AND DEPRESSIVE DISORDER: ICD-10-CM

## 2023-11-17 PROCEDURE — G0299 HHS/HOSPICE OF RN EA 15 MIN: HCPCS

## 2023-11-17 RX ORDER — MIRTAZAPINE 15 MG/1
15 TABLET, FILM COATED ORAL
Qty: 90 TABLET | Refills: 1 | Status: SHIPPED | OUTPATIENT
Start: 2023-11-17

## 2023-11-17 NOTE — Clinical Note
Patient is requesting all visits from Home health be canceled for next week 11/20/23 - 11/24/2023. Patient reports she is beginning to feel much more like herself and visits are becoming overwhelming.

## 2023-11-18 NOTE — HOME HEALTH
Routine Visit Note: Patient sittingup on side of bed on SN arrival and reports she is beginning to feel much better but is still very weak. Patient is requesting No HH visits be made at all next week.    Skill/education provided: Cardiopulmonary assessment, wound assessment and care, falls assessment,  pain assessment, medication review and teaching, blood sugar assessment    Patient/caregiver response: Patient reports she needs help in learning her medications so she can be self sufficient in her own home. up until this point patients daughter has been putting all her medications in her pillbox and she does not feel prepared to manage these on her own. Requesting further visit authorizations to continue medication teaching now that patient is alert enough to learn    Plan for next visit: Cardiopulmonary assessment, wound assessment and care, falls assessment, pain assessment, medication review and teaching, assess if patient obtained new accucheck machine. Potential discharge.    Other pertinent info: Patient reports she has to get a new accucheck machine and has been unable to check her blood sugar

## 2023-11-20 ENCOUNTER — HOME CARE VISIT (OUTPATIENT)
Dept: HOME HEALTH SERVICES | Facility: HOME HEALTHCARE | Age: 83
End: 2023-11-20
Payer: MEDICARE

## 2023-11-20 NOTE — HOME HEALTH
Routine Visit Note:    Skill/education provided: OT instr and educated pt on HEP    Patient/caregiver response: MOD assist    Plan for next visit: ther ex    Other pertinent info: pt denies any falls or med changes

## 2023-11-22 ENCOUNTER — HOME CARE VISIT (OUTPATIENT)
Dept: HOME HEALTH SERVICES | Facility: HOME HEALTHCARE | Age: 83
End: 2023-11-22
Payer: MEDICARE

## 2023-11-22 LAB
MYCOBACTERIUM SPEC CULT: NORMAL
NIGHT BLUE STAIN TISS: NORMAL

## 2023-11-24 ENCOUNTER — HOME CARE VISIT (OUTPATIENT)
Dept: HOME HEALTH SERVICES | Facility: HOME HEALTHCARE | Age: 83
End: 2023-11-24
Payer: MEDICARE

## 2023-11-28 ENCOUNTER — HOME CARE VISIT (OUTPATIENT)
Dept: HOME HEALTH SERVICES | Facility: HOME HEALTHCARE | Age: 83
End: 2023-11-28
Payer: MEDICARE

## 2023-11-28 VITALS
TEMPERATURE: 97.8 F | RESPIRATION RATE: 18 BRPM | DIASTOLIC BLOOD PRESSURE: 80 MMHG | OXYGEN SATURATION: 98 % | HEART RATE: 76 BPM | SYSTOLIC BLOOD PRESSURE: 125 MMHG

## 2023-11-28 PROCEDURE — G0151 HHCP-SERV OF PT,EA 15 MIN: HCPCS

## 2023-11-28 NOTE — HOME HEALTH
Pt reports feeling well today.  Pt states she has been up walking every day and doing some walking without her walker due to improved strength and steadiness.  Pt no longer experiencing any dizzines and reports no falls.  Pt able to participate in all therapy activities without supplemental O2 this date and SpO2 remained at or above 95% on RA throughout PT session.  PT educated pt and pt's daughter in proper weaning of SpO2 at rest to include frequent assessment of SpO2     Plan for next visit: strengthening, gait/balance training, progression of mobility

## 2023-11-29 ENCOUNTER — HOME CARE VISIT (OUTPATIENT)
Dept: HOME HEALTH SERVICES | Facility: HOME HEALTHCARE | Age: 83
End: 2023-11-29
Payer: MEDICARE

## 2023-11-29 VITALS
HEART RATE: 76 BPM | SYSTOLIC BLOOD PRESSURE: 140 MMHG | OXYGEN SATURATION: 96 % | TEMPERATURE: 97.7 F | RESPIRATION RATE: 18 BRPM | DIASTOLIC BLOOD PRESSURE: 72 MMHG

## 2023-11-29 LAB
MYCOBACTERIUM SPEC CULT: NORMAL
NIGHT BLUE STAIN TISS: NORMAL

## 2023-11-29 PROCEDURE — G0152 HHCP-SERV OF OT,EA 15 MIN: HCPCS

## 2023-11-29 PROCEDURE — G0299 HHS/HOSPICE OF RN EA 15 MIN: HCPCS

## 2023-11-30 VITALS — SYSTOLIC BLOOD PRESSURE: 138 MMHG | HEART RATE: 89 BPM | DIASTOLIC BLOOD PRESSURE: 80 MMHG | OXYGEN SATURATION: 99 %

## 2023-11-30 NOTE — HOME HEALTH
Routine Visit Note:  Patient was sitting up on the side of her bed on my arrival. She is fully dressed and looks like she has just had her hair done. She reports she was able to walk to the bathroom for the first time yesterday and she even got into the shower. She is feeling much stronger but verbalized understanding that she is still very weak    Skill/education provided: Cardiopulmonary assessment, falls assessment, pain assessment, medication review and teaching,     Patient/caregiver response: Patient reports she is getting a new glucometer and has not been checking her blood sugars. She states her daughter checks them when she is here and they have been really good.    Plan for next visit:  Cardiopulmonary assessment, falls assessment, pain assessment, medication review and teaching,     Other pertinent info: none

## 2023-12-01 NOTE — HOME HEALTH
Routine Visit Note:    Skill/education provided: OT further instr and educated pt on HEP    Patient/caregiver response: MIN assist    Plan for next visit: ther ex    Other pertinent info: pt denies any falls or med changes

## 2023-12-04 ENCOUNTER — HOME CARE VISIT (OUTPATIENT)
Dept: HOME HEALTH SERVICES | Facility: HOME HEALTHCARE | Age: 83
End: 2023-12-04
Payer: MEDICARE

## 2023-12-04 VITALS — DIASTOLIC BLOOD PRESSURE: 70 MMHG | OXYGEN SATURATION: 97 % | SYSTOLIC BLOOD PRESSURE: 130 MMHG | HEART RATE: 83 BPM

## 2023-12-04 PROCEDURE — G0152 HHCP-SERV OF OT,EA 15 MIN: HCPCS

## 2023-12-05 ENCOUNTER — HOME CARE VISIT (OUTPATIENT)
Dept: HOME HEALTH SERVICES | Facility: HOME HEALTHCARE | Age: 83
End: 2023-12-05
Payer: MEDICARE

## 2023-12-05 VITALS
HEART RATE: 78 BPM | DIASTOLIC BLOOD PRESSURE: 70 MMHG | TEMPERATURE: 97.8 F | RESPIRATION RATE: 18 BRPM | SYSTOLIC BLOOD PRESSURE: 135 MMHG | OXYGEN SATURATION: 98 %

## 2023-12-05 PROCEDURE — G0151 HHCP-SERV OF PT,EA 15 MIN: HCPCS

## 2023-12-05 NOTE — HOME HEALTH
Pt reports her back is hurting today and rates 7/10.  Pt states she had a fall in early November and has experienced continued pain in back since.  Pt seated on edge of bed upon PT arrival and reports she conts to be compliant with ther ex and mobility.    30 day reassessment: Pt making great progress with PT and was able to complete all aspects of PT reassessment this date on room air with SpO2 at or above 95%.  PT now assisting pt with gait training and balance training without AD for improved progress toward PLOF.  Pt conts to have some impaired balance and limited upright tolerance with quick fatigue requiring continued skilled home care PT in order to meet all PT and personal goals.     plan for next visit: strengthening, gait/balance training, progression of mobility

## 2023-12-06 ENCOUNTER — HOME CARE VISIT (OUTPATIENT)
Dept: HOME HEALTH SERVICES | Facility: HOME HEALTHCARE | Age: 83
End: 2023-12-06
Payer: MEDICARE

## 2023-12-06 VITALS
DIASTOLIC BLOOD PRESSURE: 72 MMHG | TEMPERATURE: 97.8 F | SYSTOLIC BLOOD PRESSURE: 144 MMHG | RESPIRATION RATE: 16 BRPM | OXYGEN SATURATION: 96 % | HEART RATE: 76 BPM

## 2023-12-06 PROCEDURE — G0299 HHS/HOSPICE OF RN EA 15 MIN: HCPCS

## 2023-12-06 NOTE — HOME HEALTH
Pt discharged from OT services this date as pt has met OT goals.   Pt and therapist in agreement with dc.  Pt to continue with HEP for maintenance.      Pt denies any falls or med changes.

## 2023-12-07 NOTE — HOME HEALTH
"Routine Visit Note: Patient reports she is doing really well but still has achy pain from where she feel and bruised herself. Teaching related to using heat therapy for 20min every 2 hours and patient states \"I will try that.\" Last blood sugar 125.    Skill/education provided: Cardiopulmonary assessment, wound assessment and care, falls assessmenty, pain assessment, medication review and teaching, blood sugar assessment    Patient/caregiver response: Patient tolerated assessment without complain and reports she is preparing to move back home to her own house    Plan for next visit: Cardiopulmonary assessment, wound assessment and care, falls assessmenty, pain assessment, medication review and teaching, blood sugar assessment    Other pertinent info: None noted"

## 2023-12-12 ENCOUNTER — HOME CARE VISIT (OUTPATIENT)
Dept: HOME HEALTH SERVICES | Facility: HOME HEALTHCARE | Age: 83
End: 2023-12-12
Payer: MEDICARE

## 2023-12-12 PROCEDURE — G0157 HHC PT ASSISTANT EA 15: HCPCS

## 2023-12-13 ENCOUNTER — HOME CARE VISIT (OUTPATIENT)
Dept: HOME HEALTH SERVICES | Facility: HOME HEALTHCARE | Age: 83
End: 2023-12-13
Payer: MEDICARE

## 2023-12-13 VITALS
OXYGEN SATURATION: 93 % | DIASTOLIC BLOOD PRESSURE: 66 MMHG | RESPIRATION RATE: 18 BRPM | SYSTOLIC BLOOD PRESSURE: 142 MMHG | HEART RATE: 78 BPM | TEMPERATURE: 98.4 F

## 2023-12-13 VITALS
SYSTOLIC BLOOD PRESSURE: 132 MMHG | OXYGEN SATURATION: 93 % | DIASTOLIC BLOOD PRESSURE: 84 MMHG | HEART RATE: 82 BPM | TEMPERATURE: 97.7 F

## 2023-12-13 PROCEDURE — G0299 HHS/HOSPICE OF RN EA 15 MIN: HCPCS

## 2023-12-14 NOTE — HOME HEALTH
Routine Visit Note:  Patient denies anynew medications, problems or concerns    Skill/education provided: Cardiopulmonary assessment, wound assessment and care, falls assessment, pain assessment, medication review and teaching    Patient/caregiver response: Patient tolerated assessment and wound care without complaint    Plan for next visit: Cardiopulmonary assessment, wound assessment and care, falls assessment, pain assessment, medication review and teaching    Other pertinent info: None

## 2023-12-19 ENCOUNTER — HOME CARE VISIT (OUTPATIENT)
Dept: HOME HEALTH SERVICES | Facility: HOME HEALTHCARE | Age: 83
End: 2023-12-19
Payer: MEDICARE

## 2023-12-19 VITALS
OXYGEN SATURATION: 94 % | DIASTOLIC BLOOD PRESSURE: 74 MMHG | RESPIRATION RATE: 18 BRPM | TEMPERATURE: 97.8 F | HEART RATE: 81 BPM | SYSTOLIC BLOOD PRESSURE: 144 MMHG

## 2023-12-19 PROCEDURE — G0151 HHCP-SERV OF PT,EA 15 MIN: HCPCS

## 2023-12-20 ENCOUNTER — HOME CARE VISIT (OUTPATIENT)
Dept: HOME HEALTH SERVICES | Facility: HOME HEALTHCARE | Age: 83
End: 2023-12-20
Payer: MEDICARE

## 2023-12-20 VITALS
SYSTOLIC BLOOD PRESSURE: 142 MMHG | OXYGEN SATURATION: 92 % | HEART RATE: 76 BPM | DIASTOLIC BLOOD PRESSURE: 70 MMHG | TEMPERATURE: 98.1 F

## 2023-12-20 PROCEDURE — G0299 HHS/HOSPICE OF RN EA 15 MIN: HCPCS

## 2023-12-20 NOTE — CASE COMMUNICATION
Discharge Summary/Summary of Care Provided:  PT Discharge Summary: The patient is an 83 year old female admitted to home health services by SN on 11/07/2023 following hospitalization for respiratory failure.    PT Assessment on 11/09/2023 revealed the problems of reports of dizziness, impaired transfers (requires direct assistance), very limited ambulation ability/tolerance (a few steps), with patient apparently limited by orthostatic h ypotension.    The patient accepted the PT interventions of therapeutic exercise, transfer training, gait training and patient education (including home safety and home exercise program (HEP) instruction) meeting all established PT goals.    Current Functional Status: Independent with transfers, independent with household level gait.    Social History: Lives with daughter currently until after [place] per patient. Patient reportedly w ill then return to her home with .    Patient discharged from home health PT after session this day (12/19/2023) due to all goals met. At time of PT Discipline Discharge the patient remained under the care of home health skilled nursing with nursing expected to soon discharge from agency due to goals met.    Patient has scheduled visit with Dr. Ling Bolden MD on 1/15/2023 9:45 AM

## 2023-12-20 NOTE — HOME HEALTH
PT Discharge Summary: The patient is an 83 year old female admitted to home health services by SN on 11/07/2023 following hospitalization for respiratory failure.    PT Assessment on 11/09/2023 revealed the problems of reports of dizziness, impaired transfers (requires direct assistance), very limited ambulation ability/tolerance (a few steps), with patient apparently limited by orthostatic hypotension.    The patient accepted the PT interventions of therapeutic exercise, transfer training, gait training and patient education (including home safety and home exercise program (HEP) instruction) meeting all established PT goals.    Current Functional Status: Independent with transfers, independent with household level gait.    Social History: Lives with daughter currently until after [place] per patient. Patient reportedly will then return to her home with .    Patient discharged from home health PT after session this day (12/19/2023) due to all goals met. At time of PT Discipline Discharge the patient remained under the care of home health skilled nursing with nursing expected to soon discharge from agency due to goals met.    Patient has scheduled visit with Dr. Ling Bolden MD on 1/15/2023 9:45 AM    Session Notes: Lives with daughter currently until after [place] per patient. Will then return to her home with . Patient reports that she is able to dress self and takes self to bathroom. Patient consents to discharge from PT services after session today as planned. Patient noted to not currently using supplemental oxygen - asserts that she now uses only at night.    Patient Response: Patient demonstrates independence with final HEP and denies any shortness of breath or fatigue following session today.

## 2023-12-21 ENCOUNTER — HOSPITAL ENCOUNTER (OUTPATIENT)
Dept: MAMMOGRAPHY | Facility: HOSPITAL | Age: 83
Discharge: HOME | End: 2023-12-21
Payer: MEDICARE

## 2023-12-21 ENCOUNTER — HOSPITAL ENCOUNTER (OUTPATIENT)
Dept: ULTRASOUND IMAGING | Facility: HOSPITAL | Age: 83
Discharge: HOME | End: 2023-12-21
Payer: MEDICARE

## 2023-12-21 DIAGNOSIS — C50.111 MALIGNANT NEOPLASM OF CENTRAL PORTION OF RIGHT BREAST IN FEMALE, ESTROGEN RECEPTOR POSITIVE: ICD-10-CM

## 2023-12-21 DIAGNOSIS — Z17.0 MALIGNANT NEOPLASM OF CENTRAL PORTION OF RIGHT BREAST IN FEMALE, ESTROGEN RECEPTOR POSITIVE: ICD-10-CM

## 2023-12-21 PROCEDURE — G0279 TOMOSYNTHESIS, MAMMO: HCPCS

## 2023-12-21 PROCEDURE — 77065 DX MAMMO INCL CAD UNI: CPT

## 2023-12-21 NOTE — PROGRESS NOTES
Left message to return call to doctor's office at [facility]. Either to get test results or message from provider.

## 2024-01-01 DIAGNOSIS — J41.0 SIMPLE CHRONIC BRONCHITIS: ICD-10-CM

## 2024-01-01 RX ORDER — IPRATROPIUM BROMIDE AND ALBUTEROL SULFATE 2.5; .5 MG/3ML; MG/3ML
3 SOLUTION RESPIRATORY (INHALATION) EVERY 4 HOURS PRN
Qty: 360 ML | Refills: 1 | Status: SHIPPED | OUTPATIENT
Start: 2024-01-01

## 2024-01-15 NOTE — PROGRESS NOTES
The ABCs of the Annual Wellness Visit  Subsequent Medicare Wellness Visit    Chief Complaint   Patient presents with    Medicare Wellness-subsequent      Subjective    History of Present Illness:  Diane Guan is a 83 y.o. female who presents for a Subsequent Medicare Wellness Visit.  MMSE score today is 23/30.   Immunizations reviewed.   Mammogram Dec 2023  DEXA scan April 2023  Colon screen July 2018    Pt is also here today for 6 month follow up on chronic medical conditions.    1) HTN- currently on amlodipine 10 mg daily; chlorthalidone 25 mg daily; irbesartan 300 mg daily. BP stable.  Denies any Cp; palpitations, SOA, dizziness, headache, trouble with vision. /80's at home.    2) T2DM -  Currently on metformin 500 mg daily.  A1C in Oct 2023 was 6.5%.  BS running 120's at home.  Denies any polyuria or polydipsia.  Denies any numbness/tingling in extremities.   3) Hyperlipidemia - currently on rosuvastatin 10 mg daily.   4) Hypothyroidism - currently on levothyroxine 100 mcg daily.   5) COPD- She uses Duo-Nebs as needed.  Feels worsening SOA; denies any wheezes or cough.  Has not been able to tolerate Breztri inhaler in the past.     6) GERD - currently on pantoprazole 40 mg daily; metoclopramide 5 mg tid.  Feels symptoms are stable.    7) Depression - currently on sertraline 150 mg daily; mirtazapine 15 mg at bedtime.  Moods stable.  Denies any SI or HI.   8) RLS - currently on ropinirole 0.25 mg at bedtime.     The following portions of the patient's history were reviewed and   updated as appropriate: allergies, current medications, past family history, past medical history, past social history, past surgical history, and problem list.    Compared to one year ago, the patient feels her physical   health is better.    Compared to one year ago, the patient feels her mental   health is better.    Recent Hospitalizations:  This patient has had a [facility] admission record on file within the last  365 days.    Current Medical Providers:  Patient Care Team:  Asia Queen APRN as PCP - General (Nurse Practitioner)  Asia Queen APRN as Nurse Practitioner (Nurse Practitioner)  Ling Bolden MD as Consulting Physician (Hematology and Oncology)    Outpatient Medications Prior to Visit   Medication Sig Dispense Refill    anastrozole (ARIMIDEX) 1 MG tablet Take 1 tablet by mouth Daily. 90 tablet 3    cetirizine (zyrTEC) 10 MG tablet Take 1 tablet by mouth every night at bedtime. Indications: Upper Respiratory Tract Allergy      docusate sodium (COLACE) 100 MG capsule Take 1 capsule by mouth 2 (Two) Times a Day. Indications: Constipation  3    ferrous sulfate 324 (65 Fe) MG tablet delayed-release EC tablet Take 1 tablet by mouth 3 (Three) Times a Week. Indications: Iron Deficiency      levothyroxine (SYNTHROID, LEVOTHROID) 100 MCG tablet One tab po q day 90 tablet 3    metFORMIN (GLUCOPHAGE) 500 MG tablet Take 1 tablet by mouth Daily With Breakfast. 90 tablet 3    metoclopramide (REGLAN) 5 MG tablet Take 1 tablet by mouth 3 (Three) Times a Day As Needed (for nausea and vomiting). 270 tablet 3    mirtazapine (REMERON) 15 MG tablet TAKE 1 TABLET BY MOUTH EVERYDAY AT BEDTIME 90 tablet 1    O2 (OXYGEN) Inhale 2 L/min Continuous. Indications: Respiratory Failure      pantoprazole (PROTONIX) 40 MG EC tablet TAKE 1 TABLET BY MOUTH EVERY MORNING BEFORE BREAKFAST. TAKE ON AN EMPTY STOMACH! 90 tablet 1    rOPINIRole (REQUIP) 0.25 MG tablet Take 1 tablet by mouth Every Night. Take 1 hour before bedtime. 90 tablet 3    rosuvastatin (CRESTOR) 10 MG tablet Take 1 tablet by mouth Every Night. 90 tablet 3    sertraline (ZOLOFT) 100 MG tablet Take 1.5 tablets by mouth Daily. 45 tablet 11    amLODIPine (NORVASC) 10 MG tablet Take 1 tablet by mouth Daily. 90 tablet 1    chlorthalidone (HYGROTON) 25 MG tablet TAKE 1 TABLET BY MOUTH EVERY DAY 90 tablet 1    irbesartan (AVAPRO) 300 MG tablet TAKE 1 TABLET BY MOUTH  EVERY DAY 90 tablet 1    Symbicort 80-4.5 MCG/ACT inhaler       Accu-Chek Softclix Lancets lancets 1 each by Other route Daily. Use to monitor blood sugar once daily 100 each 0    Alcohol Swabs ([employer] Alcohol Prep Pads) 70 % pads APPLY 1 EACH TOPICALLY DAILY.      Blood Glucose Monitoring Suppl (Accu-Chek Guide) w/Device kit Use 1 kit Daily. Use to monitor blood sugar once daily 1 kit 0    glucose blood (Accu-Chek Guide) test strip 1 each by Other route Daily. Use to monitor blood sugar once daily 100 each 0    Hearing Aid Batteries misc 1 Units Every 7 (Seven) Days. 52 each 0    ipratropium-albuterol (DUO-NEB) 0.5-2.5 mg/3 ml nebulizer TAKE 3 ML BY NEBULIZATION EVERY 4 (FOUR) HOURS AS NEEDED FOR WHEEZING OR SHORTNESS OF AIR. 360 mL 1    Isopropyl Alcohol (Alcohol Wipes) 70 % misc Apply 1 each topically Daily. 100 each 0    Melatonin (RA Melatonin) 10 MG tablet Take 1 tablet by mouth At Night As Needed (insomnia). Patient reported   Indications: Trouble Sleeping       No facility-administered medications prior to visit.       No opioid medication identified on active medication list. I have reviewed chart for other potential  high risk medication/s and harmful drug interactions in the elderly.        Aspirin is not on active medication list.  Aspirin use is not indicated based on review of current medical condition/s. Risk of harm outweighs potential benefits.  .    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    Acquired hypothyroidism    Type 2 diabetes mellitus with stage 3b chronic kidney disease, without long-term current use of insulin    Insomnia    Postmenopausal status    Shoulder pain     "Overactive bladder    Vitamin B 12 deficiency    Seasonal allergies    Absolute anemia    Vitamin D deficiency    LIBBY (obstructive sleep apnea)    Nocturnal hypoxia    Acute respiratory failure with hypoxia and hypercapnia    Abdominal pain    COPD (chronic obstructive pulmonary disease)    CKD (chronic kidney disease) stage 3, GFR 30-59 ml/min    Sepsis due to pneumonia    Acute on chronic respiratory failure with hypoxemia    Class 1 obesity due to excess calories with serious comorbidity and body mass index (BMI) of 32.0 to 32.9 in adult    Acute hypoxemic respiratory failure    History of breast cancer    Personal history of CLL (chronic lymphocytic leukemia)    History of cigarette smoking    Fall at home    Rhinovirus infection    Acute respiratory failure    Hyperkalemia    Respiratory failure     Advance Care Planning  Advance Directive is not on file.  ACP discussion was held with the patient during this visit. Patient does not have an advance directive, declines further assistance.    Review of Systems   Constitutional:  Negative for chills, fatigue and fever.   Respiratory:  Negative for cough, shortness of breath and wheezing.    Cardiovascular:  Negative for chest pain and palpitations.   Gastrointestinal:  Negative for abdominal pain, blood in stool, constipation, diarrhea, nausea and vomiting.   Genitourinary:  Negative for dysuria, frequency and hematuria.   Neurological:  Negative for dizziness, weakness and light-headedness.   Psychiatric/Behavioral:  Negative for dysphoric mood. The patient is not nervous/anxious.         Objective    Vitals:    01/16/24 1252   BP: 148/80   BP Location: Left arm   Patient Position: Sitting   Cuff Size: Adult   Pulse: 75   SpO2: 96%   Weight: 78.5 kg (173 lb)   Height: 160 cm (63\")     Estimated body mass index is 30.65 kg/m² as calculated from the following:    Height as of this encounter: 160 cm (63\").    Weight as of this encounter: 78.5 kg (173 lb).    BMI is " >= 25 and <30. (Overweight) The following options were offered after discussion;: exercise counseling/recommendations and nutrition counseling/recommendations      Does the patient have evidence of cognitive impairment? Yes    Physical Exam  Vitals reviewed.   Constitutional:       General: She is not in acute distress.     Appearance: Normal appearance. She is obese.   Cardiovascular:      Rate and Rhythm: Normal rate and regular rhythm.      Pulses: Normal pulses.      Heart sounds: Normal heart sounds. No murmur heard.  Pulmonary:      Effort: Pulmonary effort is normal. No respiratory distress.      Breath sounds: Normal breath sounds. No wheezing.   Chest:      Chest wall: No tenderness.   Abdominal:      Tenderness: There is no right CVA tenderness or left CVA tenderness.   Neurological:      General: No focal deficit present.      Mental Status: She is alert and oriented to person, place, and time.   Psychiatric:         Mood and Affect: Mood normal.       Lab Results   Component Value Date    TRIG 407 (H) 10/27/2023            HEALTH RISK ASSESSMENT    Smoking Status:  Social History     Tobacco Use   Smoking Status Former    Packs/day: 0.50    Years: 2.00    Additional pack years: 0.00    Total pack years: 1.00    Types: Cigarettes    Start date: 1990    Quit date: 1992    Years since quittin.0   Smokeless Tobacco Never     Alcohol Consumption:  Social History     Substance and Sexual Activity   Alcohol Use No     Fall Risk Screen:    STEADI Fall Risk Assessment was completed, and patient is at HIGH risk for falls. Assessment completed on:2024    Depression Screenin/16/2024     1:00 PM   PHQ-2/PHQ-9 Depression Screening   Little Interest or Pleasure in Doing Things 0-->not at all   Feeling Down, Depressed or Hopeless 1-->several days   PHQ-9: Brief Depression Severity Measure Score 1       Health Habits and Functional and Cognitive Screenin/16/2024    12:57 PM    Functional & Cognitive Status   Do you have difficulty preparing food and eating? No   Do you have difficulty bathing yourself, getting dressed or grooming yourself? No   Do you have difficulty using the toilet? No   Do you have difficulty moving around from place to place? No   Do you have trouble with steps or getting out of a bed or a chair? Yes   Current Diet Well Balanced Diet   Dental Exam Up to date   Eye Exam Up to date   Exercise (times per week) 0 times per week   Current Exercises Include No Regular Exercise   Do you need help using the phone?  No   Are you deaf or do you have serious difficulty hearing?  Yes   Do you need help to go to places out of walking distance? No   Do you need help shopping? Yes   Do you need help preparing meals?  No   Do you need help with housework?  Yes   Do you need help with laundry? No   Do you need help taking your medications? No   Do you need help managing money? No   Do you ever drive or ride in a car without wearing a seat belt? No   Have you felt unusual stress, anger or loneliness in the last month? No   Who do you live with? Spouse   If you need help, do you have trouble finding someone available to you? No   Have you been bothered in the last four weeks by sexual problems? No   Do you have difficulty concentrating, remembering or making decisions? Yes       Age-appropriate Screening Schedule:  Refer to the list below for future screening recommendations based on patient's age, sex and/or medical conditions. Orders for these recommended tests are listed in the plan section. The patient has been provided with a written plan.    Health Maintenance   Topic Date Due    COVID-19 Vaccine (1) Never done    TDAP/TD VACCINES (1 - Tdap) Never done    ZOSTER VACCINE (1 of 2) Never done    INFLUENZA VACCINE  08/01/2023    DIABETIC EYE EXAM  03/27/2024    HEMOGLOBIN A1C  04/13/2024    LIPID PANEL  05/15/2024    URINE MICROALBUMIN  10/14/2024    MAMMOGRAM  12/21/2024    ANNUAL  WELLNESS VISIT  01/16/2025    BMI FOLLOWUP  01/16/2025    DXA SCAN  04/17/2025    Pneumococcal Vaccine 65+  Completed              Assessment & Plan   CMS Preventative Services Quick Reference  Risk Factors Identified During Encounter  Fall Risk-High or Moderate: Discussed Fall Prevention in the home  Immunizations Discussed/Encouraged: Influenza  Inactivity/Sedentary: Patient was advised to exercise at least 150 minutes a week per CDC recommendations. and Patient was advised to do at least 150 minutes a week of moderate intensity activity such as brisk walking and do at least 2 days a week of activities that strengthen muscles such as resistance training and do activities to improve balance such as standing on one foot about 3 days a week.  The above risks/problems have been discussed with the patient.  Follow up actions/plans if indicated are seen below in the Assessment/Plan Section.  Pertinent information has been shared with the patient in the After Visit Summary.    Diagnoses and all orders for this visit:    1. Medicare annual wellness visit, subsequent (Primary)  Comments:  Medical/social/family hx reviewed.   DEXA and mammogram UTD.   Immunizations reviewed.    2. Type 2 diabetes mellitus with stage 3b chronic kidney disease, without long-term current use of insulin  Comments:  Stable.   Cont current medication.   Labs ordered.   RTO in 6 mo.  Orders:  -     Comprehensive metabolic panel; Future  -     Lipid panel; Future  -     Hemoglobin A1c; Future  -     TSH; Future    3. Mixed hyperlipidemia  Comments:  Stable.   Cont current medication.   Labs ordered.   RTO in 6 mo.  Orders:  -     Comprehensive metabolic panel; Future  -     Lipid panel; Future  -     Hemoglobin A1c; Future  -     TSH; Future    4. Acquired hypothyroidism  Comments:  Stable.   Cont current medication.   Labs ordered.   RTO in 6 mo.  Orders:  -     Comprehensive metabolic panel; Future  -     Lipid panel; Future  -     Hemoglobin  A1c; Future  -     TSH; Future    5. Simple chronic bronchitis  Comments:  Stable.   Cont current medication.   Labs ordered.   RTO in 6 mo.  Orders:  -     Comprehensive metabolic panel; Future  -     Lipid panel; Future  -     Hemoglobin A1c; Future  -     TSH; Future    6. Mixed anxiety and depressive disorder  Comments:  Stable.   Cont current medication.   Labs ordered.   RTO in 6 mo.  Orders:  -     Comprehensive metabolic panel; Future  -     Lipid panel; Future  -     Hemoglobin A1c; Future  -     TSH; Future    7. Restless leg syndrome  Comments:  Stable.   Cont current medication.   Labs ordered.   RTO in 6 mo.    8. Gastroesophageal reflux disease, unspecified whether esophagitis present  Comments:  Stable.   Cont current medication.   Labs ordered.   RTO in 6 mo.    9. 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  Comments:  Stable.   Cont current medication.   Labs ordered.   RTO in 6 mo.  Orders:  -     amLODIPine (NORVASC) 10 MG tablet; Take 1 tablet by mouth Daily. Indications: High Blood Pressure Disorder  Dispense: 90 tablet; Refill: 1  -     chlorthalidone (HYGROTON) 25 MG tablet; Take 1 tablet by mouth Daily. Indications: Edema  Dispense: 90 tablet; Refill: 1  -     irbesartan (AVAPRO) 300 MG tablet; Take 1 tablet by mouth Daily. Indications: High Blood Pressure Disorder  Dispense: 90 tablet; Refill: 1        Follow Up:   Return in about 6 months (around 7/16/2024).     An After Visit Summary and PPPS were made available to the patient.        I spent 18 minutes caring for Diane on this date of service. This time includes time spent by me in the following activities:preparing for the visit, reviewing tests, obtaining and/or reviewing a separately obtained history, performing a medically appropriate examination and/or evaluation , counseling and educating the patient/family/caregiver, ordering medications, tests, or procedures, referring and communicating with other health care  professionals , documenting information in the medical record, independently interpreting results and communicating that information with the patient/family/caregiver, and care coordination

## 2024-01-16 ENCOUNTER — LAB (OUTPATIENT)
Dept: FAMILY MEDICINE CLINIC | Facility: CLINIC | Age: 84
End: 2024-01-16
Payer: MEDICARE

## 2024-01-16 ENCOUNTER — OFFICE VISIT (OUTPATIENT)
Dept: FAMILY MEDICINE CLINIC | Facility: CLINIC | Age: 84
End: 2024-01-16
Payer: MEDICARE

## 2024-01-16 VITALS
DIASTOLIC BLOOD PRESSURE: 80 MMHG | SYSTOLIC BLOOD PRESSURE: 148 MMHG | WEIGHT: 173 LBS | HEART RATE: 75 BPM | HEIGHT: 63 IN | OXYGEN SATURATION: 96 % | BODY MASS INDEX: 30.65 KG/M2

## 2024-01-16 DIAGNOSIS — E03.9 ACQUIRED HYPOTHYROIDISM: ICD-10-CM

## 2024-01-16 DIAGNOSIS — J41.0 SIMPLE CHRONIC BRONCHITIS: ICD-10-CM

## 2024-01-16 DIAGNOSIS — E78.2 MIXED HYPERLIPIDEMIA: ICD-10-CM

## 2024-01-16 DIAGNOSIS — N18.32 TYPE 2 DIABETES MELLITUS WITH STAGE 3B CHRONIC KIDNEY DISEASE, WITHOUT LONG-TERM CURRENT USE OF INSULIN: ICD-10-CM

## 2024-01-16 DIAGNOSIS — K21.9 GASTROESOPHAGEAL REFLUX DISEASE, UNSPECIFIED WHETHER ESOPHAGITIS PRESENT: ICD-10-CM

## 2024-01-16 DIAGNOSIS — Z00.00 MEDICARE ANNUAL WELLNESS VISIT, SUBSEQUENT: Primary | ICD-10-CM

## 2024-01-16 DIAGNOSIS — F41.8 MIXED ANXIETY AND DEPRESSIVE DISORDER: ICD-10-CM

## 2024-01-16 DIAGNOSIS — N18.30 HYPERTENSIVE HEART AND CHRONIC KIDNEY DISEASE STAGE 3: ICD-10-CM

## 2024-01-16 DIAGNOSIS — G25.81 RESTLESS LEG SYNDROME: ICD-10-CM

## 2024-01-16 DIAGNOSIS — I13.10 HYPERTENSIVE HEART AND CHRONIC KIDNEY DISEASE STAGE 3: ICD-10-CM

## 2024-01-16 DIAGNOSIS — E11.22 TYPE 2 DIABETES MELLITUS WITH STAGE 3B CHRONIC KIDNEY DISEASE, WITHOUT LONG-TERM CURRENT USE OF INSULIN: ICD-10-CM

## 2024-01-16 LAB
ALBUMIN SERPL-MCNC: 4.4 G/DL (ref 3.5–5.2)
ALBUMIN/GLOB SERPL: 1.7 G/DL
ALP SERPL-CCNC: 128 U/L (ref 39–117)
ALT SERPL W P-5'-P-CCNC: 12 U/L (ref 1–33)
ANION GAP SERPL CALCULATED.3IONS-SCNC: 13.5 MMOL/L (ref 5–15)
AST SERPL-CCNC: 14 U/L (ref 1–32)
BILIRUB SERPL-MCNC: <0.2 MG/DL (ref 0–1.2)
BUN SERPL-MCNC: 50 MG/DL (ref 8–23)
BUN/CREAT SERPL: 35 (ref 7–25)
CALCIUM SPEC-SCNC: 9.5 MG/DL (ref 8.6–10.5)
CHLORIDE SERPL-SCNC: 107 MMOL/L (ref 98–107)
CHOLEST SERPL-MCNC: 210 MG/DL (ref 0–200)
CO2 SERPL-SCNC: 20.5 MMOL/L (ref 22–29)
CREAT SERPL-MCNC: 1.43 MG/DL (ref 0.57–1)
EGFRCR SERPLBLD CKD-EPI 2021: 36.7 ML/MIN/1.73
GLOBULIN UR ELPH-MCNC: 2.6 GM/DL
GLUCOSE SERPL-MCNC: 98 MG/DL (ref 65–99)
HBA1C MFR BLD: 6.1 % (ref 4.8–5.6)
HDLC SERPL-MCNC: 43 MG/DL (ref 40–60)
LDLC SERPL CALC-MCNC: 122 MG/DL (ref 0–100)
LDLC/HDLC SERPL: 2.7 {RATIO}
POTASSIUM SERPL-SCNC: 5.9 MMOL/L (ref 3.5–5.2)
PROT SERPL-MCNC: 7 G/DL (ref 6–8.5)
SODIUM SERPL-SCNC: 141 MMOL/L (ref 136–145)
TRIGL SERPL-MCNC: 255 MG/DL (ref 0–150)
TSH SERPL DL<=0.05 MIU/L-ACNC: 1.3 UIU/ML (ref 0.27–4.2)
VLDLC SERPL-MCNC: 45 MG/DL (ref 5–40)

## 2024-01-16 PROCEDURE — 99214 OFFICE O/P EST MOD 30 MIN: CPT | Performed by: NURSE PRACTITIONER

## 2024-01-16 PROCEDURE — 1170F FXNL STATUS ASSESSED: CPT | Performed by: NURSE PRACTITIONER

## 2024-01-16 PROCEDURE — 1159F MED LIST DOCD IN RCRD: CPT | Performed by: NURSE PRACTITIONER

## 2024-01-16 PROCEDURE — 83036 HEMOGLOBIN GLYCOSYLATED A1C: CPT | Performed by: NURSE PRACTITIONER

## 2024-01-16 PROCEDURE — G0439 PPPS, SUBSEQ VISIT: HCPCS | Performed by: NURSE PRACTITIONER

## 2024-01-16 PROCEDURE — 84443 ASSAY THYROID STIM HORMONE: CPT | Performed by: NURSE PRACTITIONER

## 2024-01-16 PROCEDURE — 96160 PT-FOCUSED HLTH RISK ASSMT: CPT | Performed by: NURSE PRACTITIONER

## 2024-01-16 PROCEDURE — 80061 LIPID PANEL: CPT | Performed by: NURSE PRACTITIONER

## 2024-01-16 PROCEDURE — 1160F RVW MEDS BY RX/DR IN RCRD: CPT | Performed by: NURSE PRACTITIONER

## 2024-01-16 PROCEDURE — 80053 COMPREHEN METABOLIC PANEL: CPT | Performed by: NURSE PRACTITIONER

## 2024-01-16 PROCEDURE — 36415 COLL VENOUS BLD VENIPUNCTURE: CPT

## 2024-01-16 RX ORDER — AMLODIPINE BESYLATE 10 MG/1
10 TABLET ORAL DAILY
Qty: 90 TABLET | Refills: 1 | Status: SHIPPED | OUTPATIENT
Start: 2024-01-16

## 2024-01-16 RX ORDER — DILTIAZEM HYDROCHLORIDE 60 MG/1
TABLET, FILM COATED ORAL
COMMUNITY
Start: 2023-12-11 | End: 2024-01-16

## 2024-01-16 RX ORDER — IRBESARTAN 300 MG/1
300 TABLET ORAL DAILY
Qty: 90 TABLET | Refills: 1 | Status: SHIPPED | OUTPATIENT
Start: 2024-01-16

## 2024-01-16 RX ORDER — CHLORTHALIDONE 25 MG/1
25 TABLET ORAL DAILY
Qty: 90 TABLET | Refills: 1 | Status: SHIPPED | OUTPATIENT
Start: 2024-01-16

## 2024-01-17 DIAGNOSIS — R79.89 ELEVATED SERUM CREATININE: Primary | ICD-10-CM

## 2024-01-17 DIAGNOSIS — E87.5 HYPERKALEMIA: ICD-10-CM

## 2024-03-01 DIAGNOSIS — K21.9 HIATAL HERNIA WITH GERD: ICD-10-CM

## 2024-03-01 DIAGNOSIS — K44.9 HIATAL HERNIA WITH GERD: ICD-10-CM

## 2024-03-01 RX ORDER — PANTOPRAZOLE SODIUM 40 MG/1
TABLET, DELAYED RELEASE ORAL
Qty: 90 TABLET | Refills: 1 | Status: SHIPPED | OUTPATIENT
Start: 2024-03-01

## 2024-05-06 DIAGNOSIS — N18.32 TYPE 2 DIABETES MELLITUS WITH STAGE 3B CHRONIC KIDNEY DISEASE, WITHOUT LONG-TERM CURRENT USE OF INSULIN: ICD-10-CM

## 2024-05-06 DIAGNOSIS — E11.22 TYPE 2 DIABETES MELLITUS WITH STAGE 3B CHRONIC KIDNEY DISEASE, WITHOUT LONG-TERM CURRENT USE OF INSULIN: ICD-10-CM

## 2024-05-06 DIAGNOSIS — G25.81 RESTLESS LEG SYNDROME: ICD-10-CM

## 2024-05-06 RX ORDER — ROPINIROLE 0.25 MG/1
TABLET, FILM COATED ORAL
Qty: 90 TABLET | Refills: 3 | Status: SHIPPED | OUTPATIENT
Start: 2024-05-06

## 2024-05-07 DIAGNOSIS — K44.9 HIATAL HERNIA WITH GERD: ICD-10-CM

## 2024-05-07 DIAGNOSIS — K21.9 HIATAL HERNIA WITH GERD: ICD-10-CM

## 2024-05-07 RX ORDER — METOCLOPRAMIDE 5 MG/1
5 TABLET ORAL 3 TIMES DAILY PRN
Qty: 270 TABLET | Refills: 2 | Status: SHIPPED | OUTPATIENT
Start: 2024-05-07

## 2024-05-31 DIAGNOSIS — F41.9 ANXIETY AND DEPRESSION: ICD-10-CM

## 2024-05-31 DIAGNOSIS — F32.A ANXIETY AND DEPRESSION: ICD-10-CM

## 2024-05-31 RX ORDER — SERTRALINE HYDROCHLORIDE 100 MG/1
150 TABLET, FILM COATED ORAL DAILY
Qty: 135 TABLET | Refills: 3 | Status: SHIPPED | OUTPATIENT
Start: 2024-05-31

## 2024-07-05 ENCOUNTER — APPOINTMENT (OUTPATIENT)
Dept: CT IMAGING | Facility: HOSPITAL | Age: 84
End: 2024-07-05
Payer: MEDICARE

## 2024-07-05 ENCOUNTER — HOSPITAL ENCOUNTER (INPATIENT)
Facility: HOSPITAL | Age: 84
LOS: 3 days | Discharge: HOME | End: 2024-07-08
Attending: FAMILY MEDICINE | Admitting: INTERNAL MEDICINE
Payer: MEDICARE

## 2024-07-05 DIAGNOSIS — J44.9 CHRONIC OBSTRUCTIVE PULMONARY DISEASE, UNSPECIFIED COPD TYPE: Primary | ICD-10-CM

## 2024-07-05 PROBLEM — J96.01 ACUTE RESPIRATORY FAILURE WITH HYPOXIA: Status: ACTIVE | Noted: 2023-10-12

## 2024-07-05 PROBLEM — R06.02 SHORTNESS OF BREATH: Status: ACTIVE | Noted: 2024-07-05

## 2024-07-05 PROBLEM — R06.02 SHORTNESS OF BREATH: Status: RESOLVED | Noted: 2024-07-05 | Resolved: 2024-07-05

## 2024-07-05 LAB
D DIMER PPP FEU-MCNC: 2.39 MG/L (FEU) (ref 0–0.83)
D-LACTATE SERPL-SCNC: 1.3 MMOL/L (ref 0.5–2)

## 2024-07-05 PROCEDURE — 87147 CULTURE TYPE IMMUNOLOGIC: CPT

## 2024-07-05 PROCEDURE — 25510000001 IOPAMIDOL PER 1 ML: Performed by: FAMILY MEDICINE

## 2024-07-05 PROCEDURE — 87040 BLOOD CULTURE FOR BACTERIA: CPT

## 2024-07-05 PROCEDURE — 25810000003 SODIUM CHLORIDE 0.9 % SOLUTION 250 ML FLEX CONT

## 2024-07-05 PROCEDURE — 25010000002 AZITHROMYCIN PER 500 MG

## 2024-07-05 PROCEDURE — 25010000002 CEFTRIAXONE PER 250 MG

## 2024-07-05 PROCEDURE — 83605 ASSAY OF LACTIC ACID: CPT

## 2024-07-05 PROCEDURE — 85379 FIBRIN DEGRADATION QUANT: CPT

## 2024-07-05 PROCEDURE — 87154 CUL TYP ID BLD PTHGN 6+ TRGT: CPT

## 2024-07-05 PROCEDURE — 25010000002 ENOXAPARIN PER 10 MG: Performed by: HOSPITALIST

## 2024-07-05 PROCEDURE — 71275 CT ANGIOGRAPHY CHEST: CPT

## 2024-07-05 RX ORDER — ACETAMINOPHEN 160 MG/5ML
650 SOLUTION ORAL EVERY 4 HOURS PRN
Status: DISCONTINUED | OUTPATIENT
Start: 2024-07-05 | End: 2024-07-08 | Stop reason: HOSPADM

## 2024-07-05 RX ORDER — SODIUM CHLORIDE 9 MG/ML
40 INJECTION, SOLUTION INTRAVENOUS AS NEEDED
Status: DISCONTINUED | OUTPATIENT
Start: 2024-07-05 | End: 2024-07-08 | Stop reason: HOSPADM

## 2024-07-05 RX ORDER — BISACODYL 10 MG
10 SUPPOSITORY, RECTAL RECTAL DAILY PRN
Status: DISCONTINUED | OUTPATIENT
Start: 2024-07-05 | End: 2024-07-08 | Stop reason: HOSPADM

## 2024-07-05 RX ORDER — AMOXICILLIN 250 MG
2 CAPSULE ORAL 2 TIMES DAILY PRN
Status: DISCONTINUED | OUTPATIENT
Start: 2024-07-05 | End: 2024-07-08 | Stop reason: HOSPADM

## 2024-07-05 RX ORDER — ACETAMINOPHEN 650 MG/1
650 SUPPOSITORY RECTAL EVERY 4 HOURS PRN
Status: DISCONTINUED | OUTPATIENT
Start: 2024-07-05 | End: 2024-07-08 | Stop reason: HOSPADM

## 2024-07-05 RX ORDER — IPRATROPIUM BROMIDE AND ALBUTEROL SULFATE 2.5; .5 MG/3ML; MG/3ML
3 SOLUTION RESPIRATORY (INHALATION)
Status: DISCONTINUED | OUTPATIENT
Start: 2024-07-06 | End: 2024-07-08 | Stop reason: HOSPADM

## 2024-07-05 RX ORDER — POLYETHYLENE GLYCOL 3350 17 G/17G
17 POWDER, FOR SOLUTION ORAL DAILY PRN
Status: DISCONTINUED | OUTPATIENT
Start: 2024-07-05 | End: 2024-07-08 | Stop reason: HOSPADM

## 2024-07-05 RX ORDER — UREA 10 %
5 LOTION (ML) TOPICAL NIGHTLY PRN
Status: DISCONTINUED | OUTPATIENT
Start: 2024-07-05 | End: 2024-07-08 | Stop reason: HOSPADM

## 2024-07-05 RX ORDER — ACETAMINOPHEN 325 MG/1
650 TABLET ORAL EVERY 4 HOURS PRN
Status: DISCONTINUED | OUTPATIENT
Start: 2024-07-05 | End: 2024-07-08 | Stop reason: HOSPADM

## 2024-07-05 RX ORDER — SODIUM CHLORIDE 0.9 % (FLUSH) 0.9 %
10 SYRINGE (ML) INJECTION AS NEEDED
Status: DISCONTINUED | OUTPATIENT
Start: 2024-07-05 | End: 2024-07-08 | Stop reason: HOSPADM

## 2024-07-05 RX ORDER — ENOXAPARIN SODIUM 100 MG/ML
1 INJECTION SUBCUTANEOUS EVERY 12 HOURS
Status: DISCONTINUED | OUTPATIENT
Start: 2024-07-05 | End: 2024-07-08 | Stop reason: HOSPADM

## 2024-07-05 RX ORDER — BISACODYL 5 MG/1
5 TABLET, DELAYED RELEASE ORAL DAILY PRN
Status: DISCONTINUED | OUTPATIENT
Start: 2024-07-05 | End: 2024-07-08 | Stop reason: HOSPADM

## 2024-07-05 RX ORDER — BUDESONIDE 0.5 MG/2ML
0.5 INHALANT ORAL
Status: DISCONTINUED | OUTPATIENT
Start: 2024-07-06 | End: 2024-07-08 | Stop reason: HOSPADM

## 2024-07-05 RX ORDER — MIRTAZAPINE 15 MG/1
15 TABLET, FILM COATED ORAL NIGHTLY
Status: DISCONTINUED | OUTPATIENT
Start: 2024-07-05 | End: 2024-07-06

## 2024-07-05 RX ORDER — SODIUM CHLORIDE 0.9 % (FLUSH) 0.9 %
10 SYRINGE (ML) INJECTION EVERY 12 HOURS SCHEDULED
Status: DISCONTINUED | OUTPATIENT
Start: 2024-07-05 | End: 2024-07-08 | Stop reason: HOSPADM

## 2024-07-05 RX ADMIN — MIRTAZAPINE 15 MG: 15 TABLET, FILM COATED ORAL at 20:21

## 2024-07-05 RX ADMIN — Medication 10 ML: at 21:50

## 2024-07-05 RX ADMIN — CEFTRIAXONE 1000 MG: 1 INJECTION, POWDER, FOR SOLUTION INTRAMUSCULAR; INTRAVENOUS at 21:51

## 2024-07-05 RX ADMIN — ENOXAPARIN SODIUM 70 MG: 100 INJECTION SUBCUTANEOUS at 22:31

## 2024-07-05 RX ADMIN — IOPAMIDOL 100 ML: 755 INJECTION, SOLUTION INTRAVENOUS at 18:56

## 2024-07-05 RX ADMIN — AZITHROMYCIN MONOHYDRATE 500 MG: 500 INJECTION, POWDER, LYOPHILIZED, FOR SOLUTION INTRAVENOUS at 21:50

## 2024-07-05 RX ADMIN — SERTRALINE 150 MG: 100 TABLET, FILM COATED ORAL at 20:21

## 2024-07-05 NOTE — H&P
[facility] Medicine Services  History & Physical    Patient Name: Diane Guan  : 1940  MRN: [id number]  Primary Care Physician:  Asia Queen APRN  Date of admission: 2024  Date and Time of Service: 2024 at 1530    Subjective      Chief Complaint: SOA, weakness    History of Present Illness: Diane Guan is a 84 y.o. female with a CMH of DM, HTN, HLD, RLS, CKD, arthritis, anxiety, depression, H/O breast cancer who presented to [facility] on 2024 as a transfer from [facility] with shortness of air and increased oxygen demand. Patient has been coughing more than usual lately, with phlegm production, myalgias, and headache. Denies fever or chills. Reports using 2L of oxygen nightly, now requiring 4L during night and day. [facility] asked for transfer due to elevated D. Dimer at 2.5, and CKD for possible PE workup. CTA chest was deferred due to elevated Creatinine.     On ED evaluation, patient has elevated Cr at 1.39, BUN 37 which appears to be at her baseline. WBC 25.23, but when looking at trends, patient has chronic leukocytosis. H&H 11.3, 37.9. CXR at [facility] shows no acute cardiopulmonary process. Hospitalist team to admit for further medical management. Plan of care discussed with patient and patient's daughter at bedside.         Review of Systems   Constitutional:  Positive for fatigue.   Respiratory:  Positive for cough and shortness of breath.    Cardiovascular:  Negative for chest pain.   Gastrointestinal:  Negative for abdominal pain.   Musculoskeletal:  Positive for back pain.       Personal History     Past Medical History:   Diagnosis Date    Acute bronchitis due to human metapneumovirus 2020    Anxiety disorder     Arthritis     Breast cancer 2019    Invasive ductal carcinoma    Chronic lymphocytic leukemia (CLL), B-cell 2019    Depression     Disease of thyroid gland     Diverticulosis of colon 2018    Identified on  colonoscopy    Dysphagia 12/2020    Dyspnea on exertion     Fall at home 10/11/2023    Hemorrhoid     Hiatal hernia 12/2020    Hiatal hernia with GERD     large hiatal hernia with high-grade reflux on barium swallow; s/p laparoscopic fundoplication with gastropexy    History of breast cancer 10/11/2023    History of cigarette smoking 10/11/2023    History of diabetes mellitus     no meds now    Hyperlipidemia     Hypertension     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    aspiration pneumonia; completed abx therapy    OAB (overactive bladder)     Personal history of CLL (chronic lymphocytic leukemia) 10/11/2023    Skin cancer     Sleep apnea     daughter states pt does not have and doesn't have machine       Past Surgical History:   Procedure Laterality Date    BLADDER SURGERY      BREAST BIOPSY      BRONCHOSCOPY N/A 09/13/2019    Procedure: BRONCHOSCOPY with bronchial washing;  Surgeon: Marnie Frankel MD;  Location: [facility] ENDOSCOPY;  Service: Pulmonary    BRONCHOSCOPY Bilateral 10/18/2023    Procedure: BRONCHOSCOPY AT BEDSIDE;  Surgeon: Vinicius Heredia DO;  Location: [facility] ENDOSCOPY;  Service: Pulmonary;  Laterality: Bilateral;    COLONOSCOPY  07/19/2018    severe diverticulosis    CYST REMOVAL      Removed a fatty cyst off of her back    ENDOSCOPY N/A 11/23/2020    Procedure: ESOPHAGOGASTRODUODENOSCOPY with dilatation (Bougie # 48, 50, 52, 54, 56, 58);  Surgeon: Den Plummer DO;  Location: [facility] ENDOSCOPY;  Service: General;  Laterality: N/A;  Post: large hiatal hernia, joshua's ulcers    HIATAL HERNIA REPAIR N/A 12/30/2020    Procedure: Laparoscopic hiatal hernia repair with gastropexy;  Surgeon: Den Plummer DO;  Location: [facility] MAIN OR;  Service: General;  Laterality: N/A;    MASTECTOMY Right 02/04/2019    Invasive ductal carcinoma    TUBAL ABDOMINAL LIGATION         Family History: family history includes Arthritis in an other family member; Diabetes in her mother; Heart disease in an other family  member. Otherwise pertinent FHx was reviewed and not pertinent to current issue.    Social History:  reports that she quit smoking about 32 years ago. Her smoking use included cigarettes. She started smoking about 34 years ago. She has a 1 pack-year smoking history. She has never used smokeless tobacco. She reports that she does not drink alcohol and does not use drugs.    Home Medications:  Prior to Admission Medications       Prescriptions Last Dose Informant Patient Reported? Taking?    Accu-Chek Softclix Lancets lancets   No No    1 each by Other route Daily. Use to monitor blood sugar once daily    Alcohol Swabs ([employer] Alcohol Prep Pads) 70 % pads   Yes No    APPLY 1 EACH TOPICALLY DAILY.    amLODIPine (NORVASC) 10 MG tablet   No No    Take 1 tablet by mouth Daily. Indications: High Blood Pressure Disorder    anastrozole (ARIMIDEX) 1 MG tablet   No No    Take 1 tablet by mouth Daily.    Blood Glucose Monitoring Suppl (Accu-Chek Guide) w/Device kit   No No    Use 1 kit Daily. Use to monitor blood sugar once daily    cetirizine (zyrTEC) 10 MG tablet   Yes No    Take 1 tablet by mouth every night at bedtime. Indications: Upper Respiratory Tract Allergy    chlorthalidone (HYGROTON) 25 MG tablet   No No    Take 1 tablet by mouth Daily. Indications: Edema    docusate sodium (COLACE) 100 MG capsule   Yes No    Take 1 capsule by mouth 2 (Two) Times a Day. Indications: Constipation    ferrous sulfate 324 (65 Fe) MG tablet delayed-release EC tablet   Yes No    Take 1 tablet by mouth 3 (Three) Times a Week. Indications: Iron Deficiency    glucose blood (Accu-Chek Guide) test strip   No No    1 each by Other route Daily. Use to monitor blood sugar once daily    Hearing Aid Batteries misc   No No    1 Units Every 7 (Seven) Days.    ipratropium-albuterol (DUO-NEB) 0.5-2.5 mg/3 ml nebulizer   No No    TAKE 3 ML BY NEBULIZATION EVERY 4 (FOUR) HOURS AS NEEDED FOR WHEEZING OR SHORTNESS OF AIR.    irbesartan (AVAPRO) 300 MG tablet    No No    Take 1 tablet by mouth Daily. Indications: High Blood Pressure Disorder    Isopropyl Alcohol (Alcohol Wipes) 70 % misc   No No    Apply 1 each topically Daily.    levothyroxine (SYNTHROID, LEVOTHROID) 100 MCG tablet   No No    One tab po q day    metFORMIN (GLUCOPHAGE) 500 MG tablet   No No    TAKE 1 TABLET BY MOUTH EVERY DAY WITH BREAKFAST    metoclopramide (REGLAN) 5 MG tablet   No No    TAKE 1 TABLET BY MOUTH 3 (THREE) TIMES A DAY AS NEEDED (FOR NAUSEA AND VOMITING).    mirtazapine (REMERON) 15 MG tablet   No No    TAKE 1 TABLET BY MOUTH EVERYDAY AT BEDTIME    O2 (OXYGEN)   Yes No    Inhale 2 L/min Continuous. Indications: Respiratory Failure    pantoprazole (PROTONIX) 40 MG EC tablet   No No    TAKE 1 TABLET BY MOUTH EVERY MORNING BEFORE BREAKFAST. TAKE ON AN EMPTY STOMACH.    rOPINIRole (REQUIP) 0.25 MG tablet   No No    TAKE 1 TABLET BY MOUTH DAILY AT NIGHT 1 HOUR BEFORE BEDTIME    rosuvastatin (CRESTOR) 10 MG tablet   No No    Take 1 tablet by mouth Every Night.    sertraline (ZOLOFT) 100 MG tablet   No No    TAKE 1 AND 1/2 TABLETS BY MOUTH EVERY DAY              Allergies:  No Known Allergies    Objective      Vitals:   Temp:  [98 °F (36.7 °C)] 98 °F (36.7 °C)  Heart Rate:  [74] 74  Resp:  [21] 21  BP: (167)/(72) 167/72  Body mass index is 29.29 kg/m².  Physical Exam  Vitals and nursing note reviewed.   Constitutional:       Appearance: Normal appearance.   HENT:      Head: Normocephalic and atraumatic.      Nose: Nose normal.      Mouth/Throat:      Mouth: Mucous membranes are moist.   Eyes:      Extraocular Movements: Extraocular movements intact.      Pupils: Pupils are equal, round, and reactive to light.   Cardiovascular:      Rate and Rhythm: Normal rate and regular rhythm.      Pulses: Normal pulses.   Pulmonary:      Effort: Pulmonary effort is normal.      Breath sounds: Wheezing and rhonchi present.   Abdominal:      General: Bowel sounds are normal.      Palpations: Abdomen is soft.    Musculoskeletal:      Cervical back: Normal range of motion and neck supple.      Right lower leg: No edema.      Left lower leg: No edema.   Skin:     General: Skin is warm.      Capillary Refill: Capillary refill takes less than 2 seconds.   Neurological:      Mental Status: She is alert and oriented to person, place, and time.         Diagnostic Data:  Lab Results (last 24 hours)       ** No results found for the last 24 hours. **             Imaging Results (Last 24 Hours)       ** No results found for the last 24 hours. **              Assessment & Plan        This is a 84 y.o. female with:    Active and Resolved Problems  Active Hospital Problems    Diagnosis  POA    **Acute respiratory failure with hypoxia [J96.01]  Yes      Resolved Hospital Problems    Diagnosis Date Resolved POA    Shortness of breath [R06.02] 07/05/2024 Yes     Acute respiratory failure with hypoxia  Cough  - on 4L NC, b/l 2L   - CXR negative  - Continue to titrate as needed to maintain sats >90%  - Wean down as tolerated  - DuoNebs q6h and Albuterol prn  - Pulm consulted    Leukocytosis  - WBC 25.23, however chronically elevated  - Concern for possible PNA given symptoms  - Start CAP treatment, await CT chest results   - LA, Blood Cx and PCT pending     Elevated D-dimer  - D-dimer 2.5  - CT chest ordered to r/o PE, GFR 37  - No s/sx DVT    CKD  - Cr 1.39, BUN 37, which is patient's baseline  - Avoid nephrotoxic medications  -Continue to monitor kidney fn on BMP     Hypertension   -BP stable  -Resume home medications once reconciled     Hypothyroidism   - continue synthroid  - TSH ordered    Hyperlipidemia  - continue home medications once reconciled          VTE Prophylaxis:  No VTE prophylaxis order currently exists.        The patient desires to be as follows:    CODE STATUS:    Level Of Support Discussed With: Next of Kin (If No Surrogate); Patient  Code Status (Patient has no pulse and is not breathing): CPR (Attempt to  Resuscitate)  Medical Interventions (Patient has pulse or is breathing): Full Support        Admission Status:  I believe this patient meets inpatient status.    Expected Length of Stay: >24 hours    PDMP and Medication Dispenses via Sidebar reviewed and consistent with patient reported medications.    I discussed the patient's findings and my recommendations with patient and family.      Signature:     This document has been electronically signed by SIOMARA Mosley on July 5, 2024 16:56 EDT   [facility] Hospitalist Team

## 2024-07-06 ENCOUNTER — APPOINTMENT (OUTPATIENT)
Dept: CARDIOLOGY | Facility: HOSPITAL | Age: 84
End: 2024-07-06
Payer: MEDICARE

## 2024-07-06 LAB
ANION GAP SERPL CALCULATED.3IONS-SCNC: 15.3 MMOL/L (ref 5–15)
BACTERIA BLD CULT: ABNORMAL
BH CV ECHO MEAS - AO MAX PG: 15.2 MMHG
BH CV ECHO MEAS - AO MEAN PG: 8 MMHG
BH CV ECHO MEAS - AO V2 MAX: 195 CM/SEC
BH CV ECHO MEAS - AO V2 VTI: 35.9 CM
BH CV ECHO MEAS - AVA(I,D): 1.23 CM2
BH CV ECHO MEAS - EDV(CUBED): 97.3 ML
BH CV ECHO MEAS - EDV(MOD-SP4): 62.9 ML
BH CV ECHO MEAS - EF(MOD-BP): 62 %
BH CV ECHO MEAS - EF(MOD-SP4): 62 %
BH CV ECHO MEAS - ESV(CUBED): 35.9 ML
BH CV ECHO MEAS - ESV(MOD-SP4): 23.9 ML
BH CV ECHO MEAS - FS: 28.3 %
BH CV ECHO MEAS - IVS/LVPW: 1.09 CM
BH CV ECHO MEAS - IVSD: 1.2 CM
BH CV ECHO MEAS - LA DIMENSION: 4.3 CM
BH CV ECHO MEAS - LAT PEAK E' VEL: 5.2 CM/SEC
BH CV ECHO MEAS - LV DIASTOLIC VOL/BSA (35-75): 34.3 CM2
BH CV ECHO MEAS - LV MASS(C)D: 192.9 GRAMS
BH CV ECHO MEAS - LV MAX PG: 2.06 MMHG
BH CV ECHO MEAS - LV MEAN PG: 1 MMHG
BH CV ECHO MEAS - LV SYSTOLIC VOL/BSA (12-30): 13 CM2
BH CV ECHO MEAS - LV V1 MAX: 71.8 CM/SEC
BH CV ECHO MEAS - LV V1 VTI: 14.1 CM
BH CV ECHO MEAS - LVIDD: 4.6 CM
BH CV ECHO MEAS - LVIDS: 3.3 CM
BH CV ECHO MEAS - LVOT AREA: 3.1 CM2
BH CV ECHO MEAS - LVOT DIAM: 2 CM
BH CV ECHO MEAS - LVPWD: 1.1 CM
BH CV ECHO MEAS - MED PEAK E' VEL: 4.9 CM/SEC
BH CV ECHO MEAS - MR MAX PG: 102.4 MMHG
BH CV ECHO MEAS - MR MAX VEL: 506 CM/SEC
BH CV ECHO MEAS - MV A MAX VEL: 126 CM/SEC
BH CV ECHO MEAS - MV DEC SLOPE: 271 CM/SEC2
BH CV ECHO MEAS - MV DEC TIME: 0.21 SEC
BH CV ECHO MEAS - MV E MAX VEL: 63 CM/SEC
BH CV ECHO MEAS - MV E/A: 0.5
BH CV ECHO MEAS - MV MAX PG: 6.7 MMHG
BH CV ECHO MEAS - MV MEAN PG: 2 MMHG
BH CV ECHO MEAS - MV P1/2T: 77.7 MSEC
BH CV ECHO MEAS - MV V2 VTI: 24.8 CM
BH CV ECHO MEAS - MVA(P1/2T): 2.8 CM2
BH CV ECHO MEAS - MVA(VTI): 1.79 CM2
BH CV ECHO MEAS - PA V2 MAX: 94.9 CM/SEC
BH CV ECHO MEAS - PI END-D VEL: 93.4 CM/SEC
BH CV ECHO MEAS - RV MAX PG: 1.29 MMHG
BH CV ECHO MEAS - RV V1 MAX: 56.7 CM/SEC
BH CV ECHO MEAS - RV V1 VTI: 10.5 CM
BH CV ECHO MEAS - SV(LVOT): 44.3 ML
BH CV ECHO MEAS - SV(MOD-SP4): 39 ML
BH CV ECHO MEAS - SVI(LVOT): 24.2 ML/M2
BH CV ECHO MEAS - SVI(MOD-SP4): 21.3 ML/M2
BH CV ECHO MEAS - TAPSE (>1.6): 2.42 CM
BH CV ECHO MEAS - TR MAX PG: 13.8 MMHG
BH CV ECHO MEAS - TR MAX VEL: 186 CM/SEC
BH CV ECHO MEASUREMENTS AVERAGE E/E' RATIO: 12.48
BH CV LOW VAS LEFT GASTRONEMIUS VESSEL: 1
BH CV LOWER VASCULAR LEFT COMMON FEMORAL AUGMENT: NORMAL
BH CV LOWER VASCULAR LEFT COMMON FEMORAL COMPETENT: NORMAL
BH CV LOWER VASCULAR LEFT COMMON FEMORAL COMPRESS: NORMAL
BH CV LOWER VASCULAR LEFT COMMON FEMORAL PHASIC: NORMAL
BH CV LOWER VASCULAR LEFT COMMON FEMORAL SPONT: NORMAL
BH CV LOWER VASCULAR LEFT DISTAL FEMORAL COMPRESS: NORMAL
BH CV LOWER VASCULAR LEFT GASTRONEMIUS COMPRESS: NORMAL
BH CV LOWER VASCULAR LEFT GASTRONEMIUS THROMBUS: NORMAL
BH CV LOWER VASCULAR LEFT GREATER SAPH AK COMPRESS: NORMAL
BH CV LOWER VASCULAR LEFT GREATER SAPH BK COMPRESS: NORMAL
BH CV LOWER VASCULAR LEFT LESSER SAPH COMPRESS: NORMAL
BH CV LOWER VASCULAR LEFT MID FEMORAL AUGMENT: NORMAL
BH CV LOWER VASCULAR LEFT MID FEMORAL COMPETENT: NORMAL
BH CV LOWER VASCULAR LEFT MID FEMORAL COMPRESS: NORMAL
BH CV LOWER VASCULAR LEFT MID FEMORAL PHASIC: NORMAL
BH CV LOWER VASCULAR LEFT MID FEMORAL SPONT: NORMAL
BH CV LOWER VASCULAR LEFT PERONEAL COMPRESS: NORMAL
BH CV LOWER VASCULAR LEFT POPLITEAL AUGMENT: NORMAL
BH CV LOWER VASCULAR LEFT POPLITEAL COMPETENT: NORMAL
BH CV LOWER VASCULAR LEFT POPLITEAL COMPRESS: NORMAL
BH CV LOWER VASCULAR LEFT POPLITEAL PHASIC: NORMAL
BH CV LOWER VASCULAR LEFT POPLITEAL SPONT: NORMAL
BH CV LOWER VASCULAR LEFT POSTERIOR TIBIAL COMPRESS: NORMAL
BH CV LOWER VASCULAR LEFT PROXIMAL FEMORAL COMPRESS: NORMAL
BH CV LOWER VASCULAR LEFT SAPHENOFEMORAL JUNCTION COMPRESS: NORMAL
BH CV LOWER VASCULAR RIGHT COMMON FEMORAL AUGMENT: NORMAL
BH CV LOWER VASCULAR RIGHT COMMON FEMORAL COMPETENT: NORMAL
BH CV LOWER VASCULAR RIGHT COMMON FEMORAL COMPRESS: NORMAL
BH CV LOWER VASCULAR RIGHT COMMON FEMORAL PHASIC: NORMAL
BH CV LOWER VASCULAR RIGHT COMMON FEMORAL SPONT: NORMAL
BH CV LOWER VASCULAR RIGHT DISTAL FEMORAL COMPRESS: NORMAL
BH CV LOWER VASCULAR RIGHT GASTRONEMIUS COMPRESS: NORMAL
BH CV LOWER VASCULAR RIGHT GREATER SAPH AK COMPRESS: NORMAL
BH CV LOWER VASCULAR RIGHT GREATER SAPH BK COMPRESS: NORMAL
BH CV LOWER VASCULAR RIGHT LESSER SAPH COMPRESS: NORMAL
BH CV LOWER VASCULAR RIGHT MID FEMORAL AUGMENT: NORMAL
BH CV LOWER VASCULAR RIGHT MID FEMORAL COMPETENT: NORMAL
BH CV LOWER VASCULAR RIGHT MID FEMORAL COMPRESS: NORMAL
BH CV LOWER VASCULAR RIGHT MID FEMORAL PHASIC: NORMAL
BH CV LOWER VASCULAR RIGHT MID FEMORAL SPONT: NORMAL
BH CV LOWER VASCULAR RIGHT PERONEAL COMPRESS: NORMAL
BH CV LOWER VASCULAR RIGHT POPLITEAL AUGMENT: NORMAL
BH CV LOWER VASCULAR RIGHT POPLITEAL COMPETENT: NORMAL
BH CV LOWER VASCULAR RIGHT POPLITEAL COMPRESS: NORMAL
BH CV LOWER VASCULAR RIGHT POPLITEAL PHASIC: NORMAL
BH CV LOWER VASCULAR RIGHT POPLITEAL SPONT: NORMAL
BH CV LOWER VASCULAR RIGHT POSTERIOR TIBIAL COMPRESS: NORMAL
BH CV LOWER VASCULAR RIGHT PROXIMAL FEMORAL COMPRESS: NORMAL
BH CV LOWER VASCULAR RIGHT SAPHENOFEMORAL JUNCTION COMPRESS: NORMAL
BH CV VAS PRELIMINARY FINDINGS SCRIPTING: 1
BH CV XLRA - TDI S': 13.3 CM/SEC
BOTTLE TYPE: ABNORMAL
BUN SERPL-MCNC: 32 MG/DL (ref 8–23)
BUN/CREAT SERPL: 23.9 (ref 7–25)
CALCIUM SPEC-SCNC: 9.1 MG/DL (ref 8.6–10.5)
CHLORIDE SERPL-SCNC: 107 MMOL/L (ref 98–107)
CO2 SERPL-SCNC: 18.7 MMOL/L (ref 22–29)
CREAT SERPL-MCNC: 1.34 MG/DL (ref 0.57–1)
DEPRECATED RDW RBC AUTO: 49.9 FL (ref 37–54)
EGFRCR SERPLBLD CKD-EPI 2021: 39.4 ML/MIN/1.73
EOSINOPHIL # BLD MANUAL: 0.3 10*3/MM3 (ref 0–0.4)
EOSINOPHIL NFR BLD MANUAL: 1 % (ref 0.3–6.2)
ERYTHROCYTE [DISTWIDTH] IN BLOOD BY AUTOMATED COUNT: 15 % (ref 12.3–15.4)
GLUCOSE SERPL-MCNC: 109 MG/DL (ref 65–99)
HCT VFR BLD AUTO: 35.6 % (ref 34–46.6)
HGB BLD-MCNC: 10.4 G/DL (ref 12–15.9)
IRON 24H UR-MRATE: 41 MCG/DL (ref 37–145)
IRON SATN MFR SERPL: 11 % (ref 20–50)
LARGE PLATELETS: ABNORMAL
LEFT ATRIUM VOLUME INDEX: 18.7 ML/M2
LYMPHOCYTES # BLD MANUAL: 23.47 10*3/MM3 (ref 0.7–3.1)
LYMPHOCYTES NFR BLD MANUAL: 4 % (ref 5–12)
MCH RBC QN AUTO: 26.7 PG (ref 26.6–33)
MCHC RBC AUTO-ENTMCNC: 29.2 G/DL (ref 31.5–35.7)
MCV RBC AUTO: 91.3 FL (ref 79–97)
MONOCYTES # BLD: 1.19 10*3/MM3 (ref 0.1–0.9)
NEUTROPHILS # BLD AUTO: 4.75 10*3/MM3 (ref 1.7–7)
NEUTROPHILS NFR BLD MANUAL: 16 % (ref 42.7–76)
PATHOLOGY REVIEW: YES
PLATELET # BLD AUTO: 222 10*3/MM3 (ref 140–450)
PMV BLD AUTO: 11.5 FL (ref 6–12)
POTASSIUM SERPL-SCNC: 4 MMOL/L (ref 3.5–5.2)
PROCALCITONIN SERPL-MCNC: 0.05 NG/ML (ref 0–0.25)
RBC # BLD AUTO: 3.9 10*6/MM3 (ref 3.77–5.28)
RBC MORPH BLD: NORMAL
SCAN SLIDE: NORMAL
SINUS: 3.1 CM
SMUDGE CELLS BLD QL SMEAR: ABNORMAL
SODIUM SERPL-SCNC: 141 MMOL/L (ref 136–145)
TIBC SERPL-MCNC: 383 MCG/DL (ref 298–536)
TRANSFERRIN SERPL-MCNC: 257 MG/DL (ref 200–360)
TSH SERPL DL<=0.05 MIU/L-ACNC: 1.33 UIU/ML (ref 0.27–4.2)
VARIANT LYMPHS NFR BLD MANUAL: 79 % (ref 19.6–45.3)
WBC NRBC COR # BLD AUTO: 29.71 10*3/MM3 (ref 3.4–10.8)

## 2024-07-06 PROCEDURE — 93306 TTE W/DOPPLER COMPLETE: CPT

## 2024-07-06 PROCEDURE — 85007 BL SMEAR W/DIFF WBC COUNT: CPT

## 2024-07-06 PROCEDURE — 94640 AIRWAY INHALATION TREATMENT: CPT

## 2024-07-06 PROCEDURE — 93306 TTE W/DOPPLER COMPLETE: CPT | Performed by: INTERNAL MEDICINE

## 2024-07-06 PROCEDURE — 80048 BASIC METABOLIC PNL TOTAL CA: CPT | Performed by: HOSPITALIST

## 2024-07-06 PROCEDURE — 84466 ASSAY OF TRANSFERRIN: CPT | Performed by: HOSPITALIST

## 2024-07-06 PROCEDURE — 84443 ASSAY THYROID STIM HORMONE: CPT

## 2024-07-06 PROCEDURE — 94761 N-INVAS EAR/PLS OXIMETRY MLT: CPT

## 2024-07-06 PROCEDURE — 84145 PROCALCITONIN (PCT): CPT

## 2024-07-06 PROCEDURE — 82607 VITAMIN B-12: CPT | Performed by: HOSPITALIST

## 2024-07-06 PROCEDURE — 25010000002 VANCOMYCIN 10 G RECONSTITUTED SOLUTION: Performed by: HOSPITALIST

## 2024-07-06 PROCEDURE — 83540 ASSAY OF IRON: CPT | Performed by: HOSPITALIST

## 2024-07-06 PROCEDURE — 25010000002 CEFTRIAXONE PER 250 MG

## 2024-07-06 PROCEDURE — 25810000003 SODIUM CHLORIDE 0.9 % SOLUTION: Performed by: HOSPITALIST

## 2024-07-06 PROCEDURE — 25810000003 SODIUM CHLORIDE 0.9 % SOLUTION 250 ML FLEX CONT: Performed by: NURSE PRACTITIONER

## 2024-07-06 PROCEDURE — 85025 COMPLETE CBC W/AUTO DIFF WBC: CPT

## 2024-07-06 PROCEDURE — 25010000002 ENOXAPARIN PER 10 MG: Performed by: HOSPITALIST

## 2024-07-06 PROCEDURE — 80048 BASIC METABOLIC PNL TOTAL CA: CPT

## 2024-07-06 PROCEDURE — 93970 EXTREMITY STUDY: CPT | Performed by: SURGERY

## 2024-07-06 PROCEDURE — 25010000002 AZITHROMYCIN PER 500 MG: Performed by: NURSE PRACTITIONER

## 2024-07-06 PROCEDURE — 94799 UNLISTED PULMONARY SVC/PX: CPT

## 2024-07-06 PROCEDURE — 82746 ASSAY OF FOLIC ACID SERUM: CPT | Performed by: HOSPITALIST

## 2024-07-06 PROCEDURE — 93970 EXTREMITY STUDY: CPT

## 2024-07-06 RX ORDER — LOSARTAN POTASSIUM 50 MG/1
100 TABLET ORAL
Status: DISCONTINUED | OUTPATIENT
Start: 2024-07-06 | End: 2024-07-08 | Stop reason: HOSPADM

## 2024-07-06 RX ORDER — AMLODIPINE BESYLATE 5 MG/1
10 TABLET ORAL DAILY
Status: DISCONTINUED | OUTPATIENT
Start: 2024-07-06 | End: 2024-07-08 | Stop reason: HOSPADM

## 2024-07-06 RX ORDER — ROSUVASTATIN CALCIUM 10 MG/1
10 TABLET, COATED ORAL NIGHTLY
Status: DISCONTINUED | OUTPATIENT
Start: 2024-07-06 | End: 2024-07-08 | Stop reason: HOSPADM

## 2024-07-06 RX ORDER — ROPINIROLE 0.25 MG/1
0.25 TABLET, FILM COATED ORAL NIGHTLY
Status: DISCONTINUED | OUTPATIENT
Start: 2024-07-06 | End: 2024-07-08 | Stop reason: HOSPADM

## 2024-07-06 RX ORDER — ANASTROZOLE 1 MG/1
1 TABLET ORAL DAILY
Status: DISCONTINUED | OUTPATIENT
Start: 2024-07-06 | End: 2024-07-06

## 2024-07-06 RX ORDER — VANCOMYCIN/0.9 % SOD CHLORIDE 1.5G/250ML
1500 PLASTIC BAG, INJECTION (ML) INTRAVENOUS ONCE
Status: COMPLETED | OUTPATIENT
Start: 2024-07-06 | End: 2024-07-06

## 2024-07-06 RX ORDER — LEVOTHYROXINE SODIUM 0.1 MG/1
100 TABLET ORAL
Status: DISCONTINUED | OUTPATIENT
Start: 2024-07-06 | End: 2024-07-08 | Stop reason: HOSPADM

## 2024-07-06 RX ORDER — IPRATROPIUM BROMIDE AND ALBUTEROL SULFATE 2.5; .5 MG/3ML; MG/3ML
3 SOLUTION RESPIRATORY (INHALATION) EVERY 4 HOURS PRN
Status: DISCONTINUED | OUTPATIENT
Start: 2024-07-06 | End: 2024-07-08 | Stop reason: HOSPADM

## 2024-07-06 RX ORDER — LIDOCAINE 4 G/G
1 PATCH TOPICAL
Status: DISCONTINUED | OUTPATIENT
Start: 2024-07-06 | End: 2024-07-08 | Stop reason: HOSPADM

## 2024-07-06 RX ORDER — MIRTAZAPINE 15 MG/1
15 TABLET, FILM COATED ORAL NIGHTLY
Status: DISCONTINUED | OUTPATIENT
Start: 2024-07-06 | End: 2024-07-08 | Stop reason: HOSPADM

## 2024-07-06 RX ORDER — ROPINIROLE 0.25 MG/1
0.25 TABLET, FILM COATED ORAL ONCE
Status: COMPLETED | OUTPATIENT
Start: 2024-07-06 | End: 2024-07-06

## 2024-07-06 RX ORDER — METOCLOPRAMIDE 5 MG/1
5 TABLET ORAL 3 TIMES DAILY PRN
Status: DISCONTINUED | OUTPATIENT
Start: 2024-07-06 | End: 2024-07-08 | Stop reason: HOSPADM

## 2024-07-06 RX ORDER — CETIRIZINE HYDROCHLORIDE 10 MG/1
10 TABLET ORAL DAILY
Status: DISCONTINUED | OUTPATIENT
Start: 2024-07-06 | End: 2024-07-08 | Stop reason: HOSPADM

## 2024-07-06 RX ORDER — DOCUSATE SODIUM 100 MG/1
100 CAPSULE, LIQUID FILLED ORAL 2 TIMES DAILY
Status: DISCONTINUED | OUTPATIENT
Start: 2024-07-06 | End: 2024-07-08 | Stop reason: HOSPADM

## 2024-07-06 RX ORDER — FERROUS SULFATE 324(65)MG
324 TABLET, DELAYED RELEASE (ENTERIC COATED) ORAL 3 TIMES WEEKLY
Status: DISCONTINUED | OUTPATIENT
Start: 2024-07-06 | End: 2024-07-08 | Stop reason: HOSPADM

## 2024-07-06 RX ORDER — PANTOPRAZOLE SODIUM 40 MG/1
40 TABLET, DELAYED RELEASE ORAL
Status: DISCONTINUED | OUTPATIENT
Start: 2024-07-06 | End: 2024-07-08 | Stop reason: HOSPADM

## 2024-07-06 RX ADMIN — LOSARTAN POTASSIUM 100 MG: 50 TABLET, FILM COATED ORAL at 08:58

## 2024-07-06 RX ADMIN — MIRTAZAPINE 15 MG: 15 TABLET, FILM COATED ORAL at 21:25

## 2024-07-06 RX ADMIN — ENOXAPARIN SODIUM 70 MG: 100 INJECTION SUBCUTANEOUS at 09:00

## 2024-07-06 RX ADMIN — PANTOPRAZOLE SODIUM 40 MG: 40 TABLET, DELAYED RELEASE ORAL at 08:53

## 2024-07-06 RX ADMIN — BUDESONIDE 0.5 MG: 0.5 INHALANT RESPIRATORY (INHALATION) at 21:05

## 2024-07-06 RX ADMIN — SERTRALINE 150 MG: 100 TABLET, FILM COATED ORAL at 21:25

## 2024-07-06 RX ADMIN — BUDESONIDE 0.5 MG: 0.5 INHALANT RESPIRATORY (INHALATION) at 07:15

## 2024-07-06 RX ADMIN — Medication 10 ML: at 21:24

## 2024-07-06 RX ADMIN — IPRATROPIUM BROMIDE AND ALBUTEROL SULFATE 3 ML: .5; 3 SOLUTION RESPIRATORY (INHALATION) at 07:11

## 2024-07-06 RX ADMIN — AZITHROMYCIN MONOHYDRATE 500 MG: 500 INJECTION, POWDER, LYOPHILIZED, FOR SOLUTION INTRAVENOUS at 21:29

## 2024-07-06 RX ADMIN — AMLODIPINE BESYLATE 10 MG: 5 TABLET ORAL at 17:28

## 2024-07-06 RX ADMIN — ROPINIROLE HYDROCHLORIDE 0.25 MG: 0.25 TABLET, FILM COATED ORAL at 21:25

## 2024-07-06 RX ADMIN — CETIRIZINE HYDROCHLORIDE 10 MG: 10 TABLET, FILM COATED ORAL at 08:53

## 2024-07-06 RX ADMIN — IPRATROPIUM BROMIDE AND ALBUTEROL SULFATE 3 ML: .5; 3 SOLUTION RESPIRATORY (INHALATION) at 15:10

## 2024-07-06 RX ADMIN — Medication 10 ML: at 09:05

## 2024-07-06 RX ADMIN — CEFTRIAXONE 1000 MG: 1 INJECTION, POWDER, FOR SOLUTION INTRAMUSCULAR; INTRAVENOUS at 17:28

## 2024-07-06 RX ADMIN — ROPINIROLE HYDROCHLORIDE 0.25 MG: 0.25 TABLET, FILM COATED ORAL at 13:54

## 2024-07-06 RX ADMIN — LEVOTHYROXINE SODIUM 100 MCG: 0.1 TABLET ORAL at 08:53

## 2024-07-06 RX ADMIN — ENOXAPARIN SODIUM 70 MG: 100 INJECTION SUBCUTANEOUS at 21:25

## 2024-07-06 RX ADMIN — LIDOCAINE 1 PATCH: 4 PATCH TOPICAL at 13:54

## 2024-07-06 RX ADMIN — IPRATROPIUM BROMIDE AND ALBUTEROL SULFATE 3 ML: .5; 3 SOLUTION RESPIRATORY (INHALATION) at 21:05

## 2024-07-06 RX ADMIN — IPRATROPIUM BROMIDE AND ALBUTEROL SULFATE 3 ML: .5; 3 SOLUTION RESPIRATORY (INHALATION) at 11:50

## 2024-07-06 RX ADMIN — VANCOMYCIN HYDROCHLORIDE 1500 MG: 10 INJECTION, POWDER, LYOPHILIZED, FOR SOLUTION INTRAVENOUS at 19:16

## 2024-07-06 RX ADMIN — DOCUSATE SODIUM 100 MG: 100 CAPSULE, LIQUID FILLED ORAL at 08:53

## 2024-07-06 RX ADMIN — ROSUVASTATIN 10 MG: 10 TABLET, FILM COATED ORAL at 21:25

## 2024-07-06 NOTE — PROGRESS NOTES
[facility] MEDICINE SERVICE  DAILY PROGRESS NOTE    NAME: Diane Guan  : 1940  MRN: [id number]      LOS: 1 day     PROVIDER OF SERVICE: Timothy Duane Brammell, MD    Chief Complaint: Acute respiratory failure with hypoxia    Subjective:     Interval History:  History taken from: patient  Patient Complaints: Patient some pulmonary congestion.  Some sputum production.  Denies any chest pain.  Eating without issue.  Denies any severe bowel complaints but tends to be constipated.  Denies any urinary symptoms.  Denies any other additional acute issues.      Review of Systems:   Review of Systems   All other systems reviewed and are negative.      Objective:     Vital Signs  Temp:  [97.6 °F (36.4 °C)-98 °F (36.7 °C)] 98 °F (36.7 °C)  Heart Rate:  [64-79] 79  Resp:  [20-23] 20  BP: (102-167)/() 153/70  Flow (L/min):  [4] 4   Body mass index is 29.29 kg/m².    Physical Exam  Physical Exam  Vitals reviewed.   Constitutional:       General: She is not in acute distress.     Appearance: Normal appearance.   HENT:      Head: Normocephalic.   Cardiovascular:      Rate and Rhythm: Normal rate and regular rhythm.   Pulmonary:      Effort: Pulmonary effort is normal. No respiratory distress.      Breath sounds: Normal breath sounds. No wheezing.   Musculoskeletal:         General: No swelling.   Neurological:      Mental Status: She is alert. Mental status is at baseline.   Psychiatric:         Behavior: Behavior normal.         Scheduled Meds   amLODIPine, 10 mg, Oral, Daily  anastrozole, 1 mg, Oral, Daily  azithromycin, 500 mg, Intravenous, Q24H  budesonide, 0.5 mg, Nebulization, BID - RT  cefTRIAXone, 1,000 mg, Intravenous, Q24H  cetirizine, 10 mg, Oral, Daily  docusate sodium, 100 mg, Oral, BID  enoxaparin, 1 mg/kg, Subcutaneous, Q12H  ferrous sulfate, 324 mg, Oral, Once per day on   ipratropium-albuterol, 3 mL, Nebulization, 4x Daily - RT  levothyroxine, 100 mcg, Oral, Q  AM  losartan, 100 mg, Oral, Q24H  mirtazapine, 15 mg, Oral, Nightly  pantoprazole, 40 mg, Oral, Q AM  rOPINIRole, 0.25 mg, Oral, Nightly  rosuvastatin, 10 mg, Oral, Nightly  sertraline, 150 mg, Oral, Nightly  sodium chloride, 10 mL, Intravenous, Q12H       PRN Meds     acetaminophen **OR** acetaminophen **OR** acetaminophen    senna-docusate sodium **AND** polyethylene glycol **AND** bisacodyl **AND** bisacodyl    Calcium Replacement - Follow Nurse / BPA Driven Protocol    ipratropium-albuterol    Magnesium Standard Dose Replacement - Follow Nurse / BPA Driven Protocol    melatonin    metoclopramide    Phosphorus Replacement - Follow Nurse / BPA Driven Protocol    Potassium Replacement - Follow Nurse / BPA Driven Protocol    sodium chloride    sodium chloride   Infusions         Diagnostic Data    Results from last 7 days   Lab Units 07/06/24  0149   WBC 10*3/mm3 29.71*   HEMOGLOBIN g/dL 10.4*   HEMATOCRIT % 35.6   PLATELETS 10*3/mm3 222   GLUCOSE mg/dL 109*   CREATININE mg/dL 1.34*   BUN mg/dL 32*   SODIUM mmol/L 141   POTASSIUM mmol/L 4.0   ANION GAP mmol/L 15.3*       CT Angiogram Chest Pulmonary Embolism    Result Date: 7/5/2024  Impression: 1.There are segmental pulmonary emboli involving left upper and left lower lobe pulmonary arterial branches. There is no right heart strain. 2.Bibasilar atelectasis. 3.Hiatal hernia which is probable paraesophageal. 4.Old rib fracture deformities are noted on the right. 5.Evidence for prior granulomatous exposure. 6.Coronary artery calcification. Report called to floor to 2E Electronically Signed: Josemanuel Anton MD  7/5/2024 7:23 PM EDT  Workstation ID: [id number]           Assessment:       pulmonary emboli  Acute hypoxemic respiratory failure  COPD with acute exacerbation  Type 2 diabetes  Restless leg syndrome  Chronic lymphocytic leukemia with chronic leukocytosis  History of breast cancer  Obstructive sleep apnea  Chronic kidney disease stage III  Anemia  History of MAC  infection       Plan:  subcu Lovenox at present with need for oral transition to anticoagulant.  Ongoing inhalation therapy.  Antibiotic therapy.  Sputum culture ordered.  Follow-up blood sugar control.  Physical therapy.  Follow-up labs.        Active and Resolved Problems  Active Hospital Problems    Diagnosis  POA    **Acute respiratory failure with hypoxia [J96.01]  Yes      Resolved Hospital Problems    Diagnosis Date Resolved POA    Shortness of breath [R06.02] 07/05/2024 Yes           VTE Prophylaxis:  Pharmacologic VTE prophylaxis orders are present.         Code status is   Code Status and Medical Interventions:   Ordered at: 07/05/24 1554     Level Of Support Discussed With:    Next of Kin (If No Surrogate)    Patient     Code Status (Patient has no pulse and is not breathing):    CPR (Attempt to Resuscitate)     Medical Interventions (Patient has pulse or is breathing):    Full Support       Plan for disposition:home in 2 days    Time: 30 minutes    Signature: Electronically signed by Timothy Duane Brammell, MD, 07/06/24, 09:06 EDT.  [facility] Hospitalist Team

## 2024-07-06 NOTE — PROGRESS NOTES
"PULMONARY CRITICAL CARE PROGRESS  NOTE      PATIENT IDENTIFICATION:  Name: Diane Guan  MRN: [id number]  :  1940     Age: 84 y.o.  Sex: female    DATE OF Note:  2024   Referring Physician: Bhavin Crawley MD                  Subjective:   Feeling better, no new issue, no SOB, no chest pain, no nausea or vomiting, no change in bowel habit, no dysuria,  no new  skin rash or itching.      Objective:  tMax 24 hrs: Temp (24hrs), Av.8 °F (36.6 °C), Min:97.6 °F (36.4 °C), Max:98 °F (36.7 °C)      Vitals Ranges:   Temp:  [97.6 °F (36.4 °C)-98 °F (36.7 °C)] 98 °F (36.7 °C)  Heart Rate:  [64-79] 79  Resp:  [20-23] 20  BP: (102-167)/() 153/70    Intake and Output Last 3 Shifts:   I/O last 3 completed shifts:  In: 360 [P.O.:360]  Out: -     Exam:  /70   Pulse 79   Temp 98 °F (36.7 °C) (Oral)   Resp 20   Ht 160 cm (63\")   Wt 75 kg (165 lb 5.5 oz)   SpO2 95%   BMI 29.29 kg/m²     General Appearance:     HEENT:  Normocephalic, without obvious abnormality. Conjunctivae/corneas clear.  Normal external ear canals. Nares normal, no drainage     Neck:  Supple, symmetrical, trachea midline. No JVD.  Lungs /Chest wall:   Bilateral basal rhonchi, respirations unlabored, symmetrical wall movement.     Heart:  Regular rate and rhythm, systolic murmur PMI left sternal border  Abdomen: Soft, nontender, no masses, no organomegaly.    Extremities: Trace edema, no clubbing or cyanosis        Medications:  amLODIPine, 10 mg, Oral, Daily  anastrozole, 1 mg, Oral, Daily  azithromycin, 500 mg, Intravenous, Q24H  budesonide, 0.5 mg, Nebulization, BID - RT  cefTRIAXone, 1,000 mg, Intravenous, Q24H  cetirizine, 10 mg, Oral, Daily  docusate sodium, 100 mg, Oral, BID  enoxaparin, 1 mg/kg, Subcutaneous, Q12H  ferrous sulfate, 324 mg, Oral, Once per day on   ipratropium-albuterol, 3 mL, Nebulization, 4x Daily - RT  levothyroxine, 100 mcg, Oral, Q AM  Lidocaine, 1 patch, Transdermal, " Q24H  losartan, 100 mg, Oral, Q24H  mirtazapine, 15 mg, Oral, Nightly  pantoprazole, 40 mg, Oral, Q AM  rOPINIRole, 0.25 mg, Oral, Nightly  rOPINIRole, 0.25 mg, Oral, Once  rosuvastatin, 10 mg, Oral, Nightly  sertraline, 150 mg, Oral, Nightly  sodium chloride, 10 mL, Intravenous, Q12H        Infusion:        PRN:    acetaminophen **OR** acetaminophen **OR** acetaminophen    senna-docusate sodium **AND** polyethylene glycol **AND** bisacodyl **AND** bisacodyl    Calcium Replacement - Follow Nurse / BPA Driven Protocol    ipratropium-albuterol    Magnesium Standard Dose Replacement - Follow Nurse / BPA Driven Protocol    melatonin    metoclopramide    Phosphorus Replacement - Follow Nurse / BPA Driven Protocol    Potassium Replacement - Follow Nurse / BPA Driven Protocol    sodium chloride    sodium chloride  Data Review:  All labs (24hrs):   Recent Results (from the past 24 hour(s))   Lactic Acid, Plasma    Collection Time: 07/05/24  9:21 PM    Specimen: Arm, Left; Blood   Result Value Ref Range    Lactate 1.3 0.5 - 2.0 mmol/L   D-dimer, Quantitative    Collection Time: 07/05/24  9:21 PM    Specimen: Arm, Left; Blood   Result Value Ref Range    D-Dimer, Quantitative 2.39 (H) 0.00 - 0.83 mg/L (FEU)   Basic Metabolic Panel    Collection Time: 07/06/24  1:49 AM    Specimen: Arm, Left; Blood   Result Value Ref Range    Glucose 109 (H) 65 - 99 mg/dL    BUN 32 (H) 8 - 23 mg/dL    Creatinine 1.34 (H) 0.57 - 1.00 mg/dL    Sodium 141 136 - 145 mmol/L    Potassium 4.0 3.5 - 5.2 mmol/L    Chloride 107 98 - 107 mmol/L    CO2 18.7 (L) 22.0 - 29.0 mmol/L    Calcium 9.1 8.6 - 10.5 mg/dL    BUN/Creatinine Ratio 23.9 7.0 - 25.0    Anion Gap 15.3 (H) 5.0 - 15.0 mmol/L    eGFR 39.4 (L) >60.0 mL/min/1.73   TSH    Collection Time: 07/06/24  1:49 AM    Specimen: Arm, Left; Blood   Result Value Ref Range    TSH 1.330 0.270 - 4.200 uIU/mL   Procalcitonin    Collection Time: 07/06/24  1:49 AM    Specimen: Arm, Left; Blood   Result Value Ref  Range    Procalcitonin 0.05 0.00 - 0.25 ng/mL   CBC Auto Differential    Collection Time: 07/06/24  1:49 AM    Specimen: Arm, Left; Blood   Result Value Ref Range    WBC 29.71 (H) 3.40 - 10.80 10*3/mm3    RBC 3.90 3.77 - 5.28 10*6/mm3    Hemoglobin 10.4 (L) 12.0 - 15.9 g/dL    Hematocrit 35.6 34.0 - 46.6 %    MCV 91.3 79.0 - 97.0 fL    MCH 26.7 26.6 - 33.0 pg    MCHC 29.2 (L) 31.5 - 35.7 g/dL    RDW 15.0 12.3 - 15.4 %    RDW-SD 49.9 37.0 - 54.0 fl    MPV 11.5 6.0 - 12.0 fL    Platelets 222 140 - 450 10*3/mm3   Scan Slide    Collection Time: 07/06/24  1:49 AM    Specimen: Arm, Left; Blood   Result Value Ref Range    Scan Slide     Manual Differential    Collection Time: 07/06/24  1:49 AM    Specimen: Arm, Left; Blood   Result Value Ref Range    Neutrophil % 16.0 (L) 42.7 - 76.0 %    Lymphocyte % 79.0 (H) 19.6 - 45.3 %    Monocyte % 4.0 (L) 5.0 - 12.0 %    Eosinophil % 1.0 0.3 - 6.2 %    Neutrophils Absolute 4.75 1.70 - 7.00 10*3/mm3    Lymphocytes Absolute 23.47 (H) 0.70 - 3.10 10*3/mm3    Monocytes Absolute 1.19 (H) 0.10 - 0.90 10*3/mm3    Eosinophils Absolute 0.30 0.00 - 0.40 10*3/mm3    RBC Morphology Normal Normal    Smudge Cells Slight/1+ None Seen    Large Platelets Slight/1+ None Seen   Path Consult Reflex    Collection Time: 07/06/24  1:49 AM    Specimen: Arm, Left; Blood   Result Value Ref Range    Pathology Review Yes    Iron Profile    Collection Time: 07/06/24  1:49 AM    Specimen: Arm, Left; Blood   Result Value Ref Range    Iron 41 37 - 145 mcg/dL    Iron Saturation (TSAT) 11 (L) 20 - 50 %    Transferrin 257 200 - 360 mg/dL    TIBC 383 298 - 536 mcg/dL        Imaging:  CT Angiogram Chest Pulmonary Embolism  Narrative: CT ANGIOGRAM CHEST PULMONARY EMBOLISM    Date of Exam: 7/5/2024 6:50 PM EDT    Indication: dyspnea.    Comparison: CT chest October 18, 2023    Technique: Axial CT images were obtained of the chest after the uneventful intravenous administration of iodinated contrast utilizing pulmonary  embolism protocol.  Sagittal and coronal reconstructions were performed.  Automated exposure control and   iterative reconstruction methods were used.    Findings:  There is calcification in the subcarinal and right hilar area. There also is a calcification at the right base. There is coronary artery calcification.    There is a pulmonary embolus in left lower lobe segmental branch best seen on image [id number]. There also is a pulm embolus in left upper lobe segmental branch on image 59.    There are some atelectatic changes in the lower lungs. There are no pleural effusions.    There are old fracture deformities involving the posterior aspect of the right 10th and 11th ribs. Patient has had right mastectomy.    Lower slices through the upper abdomen reveal a hiatal hernia. There is some distention of the gallbladder. It looks like there probably some cortical scarring involving both kidneys.  Impression: Impression:  1.There are segmental pulmonary emboli involving left upper and left lower lobe pulmonary arterial branches. There is no right heart strain.  2.Bibasilar atelectasis.  3.Hiatal hernia which is probable paraesophageal.  4.Old rib fracture deformities are noted on the right.  5.Evidence for prior granulomatous exposure.  6.Coronary artery calcification.  Report called to floor to 2E    Electronically Signed: Josemanuel Anton MD    7/5/2024 7:23 PM EDT    Workstation ID: [id number]       ASSESSMENT:  Acute hypoxic respiratory failure   Acute PE  COPD exacerbation  LIBBY  DM II  HTN  CKD  Hx breast cancer  Hx CLL  GERD  Hx MAC infection   Elevated d-dimer  LEXUS  Leukocytosis 25.3           PLAN:  Anticoagulation  Get venous Doppler  2D echo  Antibiotics  Bronchodilators  Inhaled corticosteroids  Incentive spirometer  O2 support   Electrolytes/ glycemic control  DVT prophylaxis-Lovenox   Total Critical care time in direct medical management (   ) minutes. This time specifically excludes time spent performing  procedures.  Wily Rutherford MD. D, ABSM.     7/6/2024  10:16 EDT

## 2024-07-06 NOTE — PROGRESS NOTES
"Pharmacy Antimicrobial Dosing Service    Subjective:  Diane Guan is a 84 y.o.female admitted with shortness of air/weakness . Pharmacy has been consulted to dose Vancomycin for possible bacteremia.    PMH: breast cancer      Assessment/Plan    1. Day #1 Vancomycin: Goal -600 mcg*h/mL. Vancomycin 1500 mg X 1 dose (20 mg/kg ABW) then 1000 mg every 24 hours (13 mg/kg ABW) for predicted AUC= 517 mcg*h/ml. Peak ordered for 7/7 at 2200 and random 7/8 at 1200.    2. Day #2 Ceftriaxone: 1000 mg IV q24 hrs.    3. Day #2 Azithromycin: 500 mg every 24 hours.     Will continue to monitor drug levels, renal function, culture and sensitivities, and patient clinical status.       Objective:  Relevant clinical data and objective history reviewed:  160 cm (63\")   74.8 kg (165 lb)   Ideal body weight: 52.4 kg (115 lb 8.3 oz)  Adjusted ideal body weight: 61.4 kg (135 lb 5 oz)  Body mass index is 29.23 kg/m².        Results from last 7 days   Lab Units 07/06/24  0149   CREATININE mg/dL 1.34*     Estimated Creatinine Clearance: 30.8 mL/min (A) (by C-G formula based on SCr of 1.34 mg/dL (H)).  I/O last 3 completed shifts:  In: 360 [P.O.:360]  Out: -     Results from last 7 days   Lab Units 07/06/24  0149 07/05/24  0912   WBC 10*3/mm3 29.71* 25.23*     Temperature    07/06/24 0728 07/06/24 1142 07/06/24 1609   Temp: 98 °F (36.7 °C) 98.8 °F (37.1 °C) 97.8 °F (36.6 °C)     Baseline culture/source/susceptibility:  Microbiology Results (last 10 days)       Procedure Component Value - Date/Time    Blood Culture - Blood, Arm, Left [[id number]]  (Abnormal) Collected: 07/05/24 2121    Lab Status: Preliminary result Specimen: Blood from Arm, Left Updated: 07/06/24 1555     Blood Culture Abnormal Stain     Gram Stain Anaerobic Bottle Gram positive cocci in clusters    Blood Culture ID, PCR - Blood, Arm, Left [[id number]]  (Abnormal) Collected: 07/05/24 2121    Lab Status: Final result Specimen: Blood from Arm, Left Updated: 07/06/24 4316 "     BCID, PCR Staph spp, not aureus or lugdunensis. Identification by BCID2 PCR.     BOTTLE TYPE Anaerobic Bottle            Sally Sen, PharmD  07/06/24 16:42 EDT

## 2024-07-06 NOTE — CONSULTS
PULMONARY CRITICAL CARE CONSULT NOTE      PATIENT IDENTIFICATION:  Name: Diane Guan  MRN: [id number]  :  1940     Age: 84 y.o.  Sex: female        DATE OF CONSULTATION:  2024  PRIMARY CARE PHYSICIAN    Asia Queen, SIOMARA                  CHIEF COMPLAINT: SOB    History of Present Illness:   Diane Guan is a 84 y.o. female with a history of  COPD, LIBBY,  DM II, HTN, HLD, RLS, CKD, CLL, GERD, arthritis, anxiety, depression, and Hx breast cancer who came here as a transfer from [facility] with shortness of air and increased oxygen demand. Patient has been coughing more than usual lately, with phlegm production, myalgias, and headache. Denies fever or chills. Reports using 2L of oxygen nightly, now requiring 4L during night and day. [facility] ER asked for transfer due to elevated D. Dimer at 2.5, and CKD for possible PE workup. CTA chest was deferred due to elevated Creatinine.       Review of Systems:   Constitutional: As above    Eyes: negative   ENT/oropharynx: negative   Cardiovascular: As above    Respiratory: As above    Gastrointestinal: negative   Genitourinary: negative   Neurological: negative   Musculoskeletal: negative   Integument/breast: negative   Endocrine: negative   Allergic/Immunologic: negative     Past Medical History:  Past Medical History:   Diagnosis Date    Acute bronchitis due to human metapneumovirus 2020    Anxiety disorder     Arthritis     Breast cancer 2019    Invasive ductal carcinoma    Chronic lymphocytic leukemia (CLL), B-cell 2019    Depression     Disease of thyroid gland     Diverticulosis of colon     Identified on colonoscopy    Dysphagia 2020    Dyspnea on exertion     Fall at home 10/11/2023    Hemorrhoid     Hiatal hernia 2020    Hiatal hernia with GERD     large hiatal hernia with high-grade reflux on barium swallow; s/p laparoscopic fundoplication with gastropexy    History of breast cancer 10/11/2023     History of cigarette smoking 10/11/2023    History of diabetes mellitus     no meds now    Hyperlipidemia     Hypertension     Mycobacterium avium complex 2019    aspiration pneumonia; completed abx therapy    OAB (overactive bladder)     Personal history of CLL (chronic lymphocytic leukemia) 10/11/2023    Skin cancer     Sleep apnea     daughter states pt does not have and doesn't have machine       Past Surgical History:  Past Surgical History:   Procedure Laterality Date    BLADDER SURGERY      BREAST BIOPSY      BRONCHOSCOPY N/A 2019    Procedure: BRONCHOSCOPY with bronchial washing;  Surgeon: Marnie Frankel MD;  Location: [facility] ENDOSCOPY;  Service: Pulmonary    BRONCHOSCOPY Bilateral 10/18/2023    Procedure: BRONCHOSCOPY AT BEDSIDE;  Surgeon: Vinicius Heredia DO;  Location: [facility] ENDOSCOPY;  Service: Pulmonary;  Laterality: Bilateral;    COLONOSCOPY  2018    severe diverticulosis    CYST REMOVAL      Removed a fatty cyst off of her back    ENDOSCOPY N/A 2020    Procedure: ESOPHAGOGASTRODUODENOSCOPY with dilatation (Bougie # 48, 50, 52, 54, 56, 58);  Surgeon: Den Plummer DO;  Location: [facility] ENDOSCOPY;  Service: General;  Laterality: N/A;  Post: large hiatal hernia, joshua's ulcers    HIATAL HERNIA REPAIR N/A 2020    Procedure: Laparoscopic hiatal hernia repair with gastropexy;  Surgeon: Den Plummer DO;  Location: [facility] MAIN OR;  Service: General;  Laterality: N/A;    MASTECTOMY Right 2019    Invasive ductal carcinoma    TUBAL ABDOMINAL LIGATION          Family History:  Family History   Problem Relation Age of Onset    Diabetes Mother     Arthritis Other     Heart disease Other         Social History:   Social History     Tobacco Use    Smoking status: Former     Current packs/day: 0.00     Average packs/day: 0.5 packs/day for 2.0 years (1.0 ttl pk-yrs)     Types: Cigarettes     Start date: 1990     Quit date: 1992     Years since quittin.5     Smokeless tobacco: Never   Substance Use Topics    Alcohol use: No        Allergies:  No Known Allergies    Home Meds:  Medications Prior to Admission   Medication Sig Dispense Refill Last Dose    Accu-Chek Softclix Lancets lancets 1 each by Other route Daily. Use to monitor blood sugar once daily 100 each 0     Alcohol Swabs ([employer] Alcohol Prep Pads) 70 % pads APPLY 1 EACH TOPICALLY DAILY.       amLODIPine (NORVASC) 10 MG tablet Take 1 tablet by mouth Daily. Indications: High Blood Pressure Disorder 90 tablet 1     anastrozole (ARIMIDEX) 1 MG tablet Take 1 tablet by mouth Daily. 90 tablet 3     Blood Glucose Monitoring Suppl (Accu-Chek Guide) w/Device kit Use 1 kit Daily. Use to monitor blood sugar once daily 1 kit 0     cetirizine (zyrTEC) 10 MG tablet Take 1 tablet by mouth every night at bedtime. Indications: Upper Respiratory Tract Allergy       chlorthalidone (HYGROTON) 25 MG tablet Take 1 tablet by mouth Daily. Indications: Edema 90 tablet 1     docusate sodium (COLACE) 100 MG capsule Take 1 capsule by mouth 2 (Two) Times a Day. Indications: Constipation  3     ferrous sulfate 324 (65 Fe) MG tablet delayed-release EC tablet Take 1 tablet by mouth 3 (Three) Times a Week. Indications: Iron Deficiency       glucose blood (Accu-Chek Guide) test strip 1 each by Other route Daily. Use to monitor blood sugar once daily 100 each 0     Hearing Aid Batteries misc 1 Units Every 7 (Seven) Days. 52 each 0     ipratropium-albuterol (DUO-NEB) 0.5-2.5 mg/3 ml nebulizer TAKE 3 ML BY NEBULIZATION EVERY 4 (FOUR) HOURS AS NEEDED FOR WHEEZING OR SHORTNESS OF AIR. 360 mL 1     irbesartan (AVAPRO) 300 MG tablet Take 1 tablet by mouth Daily. Indications: High Blood Pressure Disorder 90 tablet 1     Isopropyl Alcohol (Alcohol Wipes) 70 % misc Apply 1 each topically Daily. 100 each 0     levothyroxine (SYNTHROID, LEVOTHROID) 100 MCG tablet One tab po q day 90 tablet 3     metFORMIN (GLUCOPHAGE) 500 MG tablet TAKE 1 TABLET BY MOUTH EVERY  "DAY WITH BREAKFAST 90 tablet 3     metoclopramide (REGLAN) 5 MG tablet TAKE 1 TABLET BY MOUTH 3 (THREE) TIMES A DAY AS NEEDED (FOR NAUSEA AND VOMITING). 270 tablet 2     mirtazapine (REMERON) 15 MG tablet TAKE 1 TABLET BY MOUTH EVERYDAY AT BEDTIME 90 tablet 1     O2 (OXYGEN) Inhale 2 L/min Continuous. Indications: Respiratory Failure       pantoprazole (PROTONIX) 40 MG EC tablet TAKE 1 TABLET BY MOUTH EVERY MORNING BEFORE BREAKFAST. TAKE ON AN EMPTY STOMACH. 90 tablet 1     rOPINIRole (REQUIP) 0.25 MG tablet TAKE 1 TABLET BY MOUTH DAILY AT NIGHT 1 HOUR BEFORE BEDTIME 90 tablet 3     rosuvastatin (CRESTOR) 10 MG tablet Take 1 tablet by mouth Every Night. 90 tablet 3     sertraline (ZOLOFT) 100 MG tablet TAKE 1 AND 1/2 TABLETS BY MOUTH EVERY  tablet 3        Objective:  tMax 24 hrs: Temp (24hrs), Av.8 °F (36.6 °C), Min:97.6 °F (36.4 °C), Max:98 °F (36.7 °C)      Vitals Ranges:   Temp:  [97.6 °F (36.4 °C)-98 °F (36.7 °C)] 97.6 °F (36.4 °C)  Heart Rate:  [74-76] 76  Resp:  [20-21] 20  BP: (154-167)/() 154/102    Intake and Output Last 3 Shifts:   I/O last 3 completed shifts:  In: 120 [P.O.:120]  Out: -     Exam:  BP (!) 154/102 (BP Location: Left arm, Patient Position: Lying)   Pulse 76   Temp 97.6 °F (36.4 °C) (Oral)   Resp 20   Ht 160 cm (63\")   Wt 75 kg (165 lb 5.5 oz)   SpO2 92%   BMI 29.29 kg/m²       24  1507   Weight: 75 kg (165 lb 5.5 oz)     General Appearance: Alert     HEENT:  Normocephalic, without obvious abnormality, atraumatic. Conjunctivae/corneas clear.  Normal external ear canals. Nares normal, no drainage.  Neck:  Supple, symmetrical, trachea midline. No JVD.  Lungs /Chest wall:   Bilateral basal rhonchi, respirations unlabored, symmetrical wall movement.     Heart:  Regular rate and rhythm, systolic murmur PMI left sternal border  Abdomen: Soft, nontender, no masses, no organomegaly.    Extremities: Trace edema, no clubbing or cyanosis        Data Review:  All labs " (24hrs):   Recent Results (from the past 24 hour(s))   LIPASE    Collection Time: 07/05/24  9:12 AM    Specimen: Fresh Frozen Plasma    Specimen type and source: Plasma, Blood specimen from patient (specimen)   Result Value Ref Range    Lipase 6 0 - 59 U/L   CBC AND DIFFERENTIAL    Collection Time: 07/05/24  9:12 AM    Specimen type and source: Whole Blood, Blood specimen from patient (specimen)   Result Value Ref Range    WBC 25.23 (H) 4.5 - 11.0 10*3/uL    RBC 4.21 4.0 - 5.2 10*6/uL    Hemoglobin 11.3 (L) 12.0 - 16.0 g/dL    Hematocrit 37.9 36.0 - 46.0 %    MCV 90.0 80.0 - 100.0 fL    MCH 26.8 26.0 - 34.0 pg    MCHC 29.8 (L) 31.0 - 37.0 g/dL    RDW 14.9 12.0 - 16.8 %    Platelets 230 140 - 440 10*3/uL    MPV 11.0 8.4 - 12.4 fL    Differential Type Hospital CBC w/AutoDiff (arb'U)    Neutrophil Rel % 16.0 (L) 45 - 80 %    Lymphocyte Rel % 79.9 (H) 15 - 50 %    Monocyte Rel % 2.5 0 - 15 %    Eosinophil % 1.1 0 - 7 %    Basophil Rel % 0.3 0 - 2 %    Neutrophils Absolute 4.00 2.0 - 8.8 10*3/uL    Lymphocytes Absolute 20.17 (H) 0.7 - 5.5 10*3/uL    Monocytes Absolute 0.63 0.0 - 1.7 10*3/uL    Eosinophils Absolute 0.28 0.0 - 0.8 10*3/uL    Basophils Absolute 0.08 0.0 - 0.2 10*3/uL    Immature Grans % 0.2 0.0 - 1.0 %    Immature Grans, Absolute 0.05 0.00 - 0.10 10*3/uL   Lactic Acid, Plasma    Collection Time: 07/05/24  9:21 PM    Specimen: Arm, Left; Blood   Result Value Ref Range    Lactate 1.3 0.5 - 2.0 mmol/L   D-dimer, Quantitative    Collection Time: 07/05/24  9:21 PM    Specimen: Arm, Left; Blood   Result Value Ref Range    D-Dimer, Quantitative 2.39 (H) 0.00 - 0.83 mg/L (FEU)        Imaging:  CT Angiogram Chest Pulmonary Embolism    Result Date: 7/5/2024  CT ANGIOGRAM CHEST PULMONARY EMBOLISM Date of Exam: 7/5/2024 6:50 PM EDT Indication: dyspnea. Comparison: CT chest October 18, 2023 Technique: Axial CT images were obtained of the chest after the uneventful intravenous administration of iodinated contrast  utilizing pulmonary embolism protocol.  Sagittal and coronal reconstructions were performed.  Automated exposure control and iterative reconstruction methods were used. Findings: There is calcification in the subcarinal and right hilar area. There also is a calcification at the right base. There is coronary artery calcification. There is a pulmonary embolus in left lower lobe segmental branch best seen on image [id number]. There also is a pulm embolus in left upper lobe segmental branch on image 59. There are some atelectatic changes in the lower lungs. There are no pleural effusions. There are old fracture deformities involving the posterior aspect of the right 10th and 11th ribs. Patient has had right mastectomy. Lower slices through the upper abdomen reveal a hiatal hernia. There is some distention of the gallbladder. It looks like there probably some cortical scarring involving both kidneys.     Impression: 1.There are segmental pulmonary emboli involving left upper and left lower lobe pulmonary arterial branches. There is no right heart strain. 2.Bibasilar atelectasis. 3.Hiatal hernia which is probable paraesophageal. 4.Old rib fracture deformities are noted on the right. 5.Evidence for prior granulomatous exposure. 6.Coronary artery calcification. Report called to floor to 2E Electronically Signed: Josemanuel Anton MD  2024 7:23 PM EDT  Workstation ID: [id number]    XR Chest 2Vw    Result Date: 2024  REVIEWING YOUR TEST RESULTS IN [facility] IS NOT A SUBSTITUTE FOR DISCUSSING THOSE RESULTS WITH YOUR HEALTH CARE PROVIDER. PLEASE CONTACT YOUR PROVIDER VIA [facility] TO DISCUSS ANY QUESTIONS OR CONCERNS YOU MAY HAVE REGARDING THESE TEST RESULTS.  RADIOLOGY REPORT FACILITY: [facility] UNIT/AGE/GENDER: ED    AGE: 84 YR        GENDER: F PATIENT NAME/:           LILI APPLELUCA    1940 UNIT NUMBER: [id number] ACCOUNT NUMBER: [id number] ACCESSION NUMBER: [id number] CHEST  COMPARISON:  6/3/2024  FINDINGS:  CARDIOVASCULAR: Cardiomediastinal silhouette is not enlarged.  LUNGS: No evidence of focal consolidation, vascular redistribution, or pleural fluid. No radiographic evidence of active tuberculosis.  OSSEOUS STRUCTURES: Bony structures are intact.  SOFT TISSUES: No foreign bodies.  TUBES/LINES/IMPLANTABLE DEVICES: None.  IMPRESSION:  No radiographic evidence of acute cardiopulmonary disease. Report electronically signed by:  Lucien Levy DO  on 2024, Jul 05, 09:53 AM EDT       Current:  [START ON 7/6/2024] azithromycin, 500 mg, Intravenous, Q24H  [START ON 7/6/2024] budesonide, 0.5 mg, Nebulization, BID - RT  cefTRIAXone, 1,000 mg, Intravenous, Q24H  enoxaparin, 1 mg/kg, Subcutaneous, Q12H  [START ON 7/6/2024] ipratropium-albuterol, 3 mL, Nebulization, 4x Daily - RT  mirtazapine, 15 mg, Oral, Nightly  sertraline, 150 mg, Oral, Nightly  sodium chloride, 10 mL, Intravenous, Q12H        Infusion:        PRN:    acetaminophen **OR** acetaminophen **OR** acetaminophen    senna-docusate sodium **AND** polyethylene glycol **AND** bisacodyl **AND** bisacodyl    Calcium Replacement - Follow Nurse / BPA Driven Protocol    Magnesium Standard Dose Replacement - Follow Nurse / BPA Driven Protocol    melatonin    Phosphorus Replacement - Follow Nurse / BPA Driven Protocol    Potassium Replacement - Follow Nurse / BPA Driven Protocol    sodium chloride    sodium chloride    ASSESSMENT:  Acute hypoxic respiratory failure   Acute PE  COPD  LIBBY  DM II  HTN  CKD  Hx breast cancer  Hx CLL  GERD  Hx MAC infection   Elevated d-dimer  LEXUS  Leukocytosis 25.3         PLAN:  Antibiotics  Bronchodilators  Inhaled corticosteroids  Incentive spirometer  O2 support   Electrolytes/ glycemic control  DVT prophylaxis-Lovenox     Discussed with Dr Krish Maddox, APRN  7/5/2024  23:22 EDT      I personally have examined  and interviewed the patient. I have reviewed the history, data, problems, assessment and plan with  our NP.  Total Critical care time in direct medical management (   ) minutes, This time specifically excludes time spent performing procedures.    Wily Rutherford MD

## 2024-07-06 NOTE — PLAN OF CARE
Goal Outcome Evaluation:                        Problem: Adult Inpatient Plan of Care  Goal: Plan of Care Review  Outcome: Ongoing, Progressing  Goal: Patient-Specific Goal (Individualized)  Outcome: Ongoing, Progressing  Goal: Absence of Hospital-Acquired Illness or Injury  Outcome: Ongoing, Progressing  Intervention: Identify and Manage Fall Risk  Recent Flowsheet Documentation  Taken 7/6/2024 1700 by [place] Melvin RN  Safety Promotion/Fall Prevention:   activity supervised   assistive device/personal items within reach   clutter free environment maintained   fall prevention program maintained   nonskid shoes/slippers when out of bed   room organization consistent   safety round/check completed  Taken 7/6/2024 1513 by Florida Melvin RN  Safety Promotion/Fall Prevention:   activity supervised   assistive device/personal items within reach   clutter free environment maintained   fall prevention program maintained   nonskid shoes/slippers when out of bed   room organization consistent   safety round/check completed  Taken 7/6/2024 1400 by Florida Melvin RN  Safety Promotion/Fall Prevention:   activity supervised   assistive device/personal items within reach   clutter free environment maintained   fall prevention program maintained   nonskid shoes/slippers when out of bed   room organization consistent   safety round/check completed  Taken 7/6/2024 1300 by Florida Melvin RN  Safety Promotion/Fall Prevention:   activity supervised   assistive device/personal items within reach   clutter free environment maintained   fall prevention program maintained   nonskid shoes/slippers when out of bed   room organization consistent   safety round/check completed  Taken 7/6/2024 1200 by Florida Melvin RN  Safety Promotion/Fall Prevention:   activity supervised   assistive device/personal items within reach   clutter free environment maintained   fall prevention program maintained   nonskid shoes/slippers when out  of bed   room organization consistent   safety round/check completed  Taken 7/6/2024 1000 by Florida Melvin RN  Safety Promotion/Fall Prevention:   activity supervised   assistive device/personal items within reach   clutter free environment maintained   fall prevention program maintained   nonskid shoes/slippers when out of bed   room organization consistent   safety round/check completed  Intervention: Prevent Skin Injury  Recent Flowsheet Documentation  Taken 7/6/2024 1600 by Florida Melvin RN  Skin Protection:   adhesive use limited   incontinence pads utilized   tubing/devices free from skin contact   transparent dressing maintained  Taken 7/6/2024 1200 by Florida Melvin RN  Skin Protection:   adhesive use limited   incontinence pads utilized   transparent dressing maintained   tubing/devices free from skin contact  Intervention: Prevent and Manage VTE (Venous Thromboembolism) Risk  Recent Flowsheet Documentation  Taken 7/6/2024 1600 by Florida Melvin RN  Activity Management: activity encouraged  Intervention: Prevent Infection  Recent Flowsheet Documentation  Taken 7/6/2024 1700 by Florida Melvin RN  Infection Prevention:   environmental surveillance performed   hand hygiene promoted   rest/sleep promoted   single patient room provided  Taken 7/6/2024 1600 by Florida Melvin RN  Infection Prevention:   environmental surveillance performed   hand hygiene promoted   rest/sleep promoted   single patient room provided  Taken 7/6/2024 1513 by Florida Melvin RN  Infection Prevention:   environmental surveillance performed   hand hygiene promoted   rest/sleep promoted   single patient room provided  Taken 7/6/2024 1400 by Florida Melvin RN  Infection Prevention:   environmental surveillance performed   hand hygiene promoted   rest/sleep promoted   single patient room provided  Taken 7/6/2024 1300 by Florida Melvin RN  Infection Prevention:   environmental surveillance performed   hand  hygiene promoted   rest/sleep promoted   single patient room provided  Taken 7/6/2024 1200 by Florida Melvin RN  Infection Prevention:   environmental surveillance performed   hand hygiene promoted   rest/sleep promoted   single patient room provided  Taken 7/6/2024 1000 by Florida Melvin RN  Infection Prevention:   environmental surveillance performed   hand hygiene promoted   rest/sleep promoted   single patient room provided  Goal: Optimal Comfort and Wellbeing  Outcome: Ongoing, Progressing  Intervention: Provide Person-Centered Care  Recent Flowsheet Documentation  Taken 7/6/2024 1600 by Florida Melvin RN  Trust Relationship/Rapport:   care explained   emotional support provided   empathic listening provided   questions answered   questions encouraged   reassurance provided   thoughts/feelings acknowledged   choices provided  Taken 7/6/2024 1200 by Florida Melvin RN  Trust Relationship/Rapport:   care explained   choices provided   emotional support provided   empathic listening provided   questions answered   questions encouraged   reassurance provided   thoughts/feelings acknowledged  Goal: Readiness for Transition of Care  Outcome: Ongoing, Progressing     Problem: Fall Injury Risk  Goal: Absence of Fall and Fall-Related Injury  Outcome: Ongoing, Progressing  Intervention: Identify and Manage Contributors  Recent Flowsheet Documentation  Taken 7/6/2024 1700 by Florida Melvin RN  Medication Review/Management: medications reviewed  Taken 7/6/2024 1600 by Florida Melvin RN  Medication Review/Management: medications reviewed  Self-Care Promotion:   BADL personal objects within reach   independence encouraged   BADL personal routines maintained  Taken 7/6/2024 1513 by Florida Melvin, RN  Medication Review/Management: medications reviewed  Taken 7/6/2024 1400 by Florida Melvin RN  Medication Review/Management: medications reviewed  Taken 7/6/2024 1300 by Florida Melvin, LUCINA  Medication  Review/Management: medications reviewed  Taken 7/6/2024 1200 by Florida Melvin RN  Medication Review/Management: medications reviewed  Self-Care Promotion:   independence encouraged   BADL personal objects within reach   BADL personal routines maintained  Taken 7/6/2024 1000 by Florida Melvin RN  Medication Review/Management: medications reviewed  Intervention: Promote Injury-Free Environment  Recent Flowsheet Documentation  Taken 7/6/2024 1700 by Florida Melvin RN  Safety Promotion/Fall Prevention:   activity supervised   assistive device/personal items within reach   clutter free environment maintained   fall prevention program maintained   nonskid shoes/slippers when out of bed   room organization consistent   safety round/check completed  Taken 7/6/2024 1513 by Florida Melvin RN  Safety Promotion/Fall Prevention:   activity supervised   assistive device/personal items within reach   clutter free environment maintained   fall prevention program maintained   nonskid shoes/slippers when out of bed   room organization consistent   safety round/check completed  Taken 7/6/2024 1400 by Florida Melvin RN  Safety Promotion/Fall Prevention:   activity supervised   assistive device/personal items within reach   clutter free environment maintained   fall prevention program maintained   nonskid shoes/slippers when out of bed   room organization consistent   safety round/check completed  Taken 7/6/2024 1300 by Florida Melvin RN  Safety Promotion/Fall Prevention:   activity supervised   assistive device/personal items within reach   clutter free environment maintained   fall prevention program maintained   nonskid shoes/slippers when out of bed   room organization consistent   safety round/check completed  Taken 7/6/2024 1200 by Florida Melvin RN  Safety Promotion/Fall Prevention:   activity supervised   assistive device/personal items within reach   clutter free environment maintained   fall prevention  program maintained   nonskid shoes/slippers when out of bed   room organization consistent   safety round/check completed  Taken 7/6/2024 1000 by Florida Melvin RN  Safety Promotion/Fall Prevention:   activity supervised   assistive device/personal items within reach   clutter free environment maintained   fall prevention program maintained   nonskid shoes/slippers when out of bed   room organization consistent   safety round/check completed     Problem: Infection  Goal: Absence of Infection Signs and Symptoms  Outcome: Ongoing, Progressing  Intervention: Prevent or Manage Infection  Recent Flowsheet Documentation  Taken 7/6/2024 1700 by Florida Melvin RN  Isolation Precautions: precautions maintained  Taken 7/6/2024 1600 by Florida Melvin RN  Isolation Precautions: precautions maintained  Taken 7/6/2024 1513 by Florida Melvin RN  Isolation Precautions: precautions maintained  Taken 7/6/2024 1400 by Florida Melvin RN  Isolation Precautions: precautions maintained  Taken 7/6/2024 1300 by Florida Melvin RN  Isolation Precautions: precautions maintained  Taken 7/6/2024 1200 by Florida Melvin RN  Isolation Precautions: precautions maintained  Taken 7/6/2024 1000 by Florida Melvin RN  Isolation Precautions: precautions maintained

## 2024-07-07 LAB
ANION GAP SERPL CALCULATED.3IONS-SCNC: 12.3 MMOL/L (ref 5–15)
ANISOCYTOSIS BLD QL: ABNORMAL
B PARAPERT DNA SPEC QL NAA+PROBE: NOT DETECTED
B PERT DNA SPEC QL NAA+PROBE: NOT DETECTED
BASOPHILS # BLD MANUAL: 0.23 10*3/MM3 (ref 0–0.2)
BASOPHILS NFR BLD MANUAL: 1 % (ref 0–1.5)
BUN SERPL-MCNC: 28 MG/DL (ref 8–23)
BUN/CREAT SERPL: 21.5 (ref 7–25)
C PNEUM DNA NPH QL NAA+NON-PROBE: NOT DETECTED
CALCIUM SPEC-SCNC: 8.6 MG/DL (ref 8.6–10.5)
CHLORIDE SERPL-SCNC: 110 MMOL/L (ref 98–107)
CO2 SERPL-SCNC: 18.7 MMOL/L (ref 22–29)
CREAT SERPL-MCNC: 1.3 MG/DL (ref 0.57–1)
DEPRECATED RDW RBC AUTO: 50.7 FL (ref 37–54)
EGFRCR SERPLBLD CKD-EPI 2021: 40.9 ML/MIN/1.73
EOSINOPHIL # BLD MANUAL: 0.68 10*3/MM3 (ref 0–0.4)
EOSINOPHIL NFR BLD MANUAL: 3 % (ref 0.3–6.2)
ERYTHROCYTE [DISTWIDTH] IN BLOOD BY AUTOMATED COUNT: 15.1 % (ref 12.3–15.4)
FLUAV SUBTYP SPEC NAA+PROBE: NOT DETECTED
FLUBV RNA ISLT QL NAA+PROBE: NOT DETECTED
FOLATE SERPL-MCNC: 12.3 NG/ML (ref 4.78–24.2)
GLUCOSE SERPL-MCNC: 135 MG/DL (ref 65–99)
HADV DNA SPEC NAA+PROBE: NOT DETECTED
HCOV 229E RNA SPEC QL NAA+PROBE: NOT DETECTED
HCOV HKU1 RNA SPEC QL NAA+PROBE: NOT DETECTED
HCOV NL63 RNA SPEC QL NAA+PROBE: NOT DETECTED
HCOV OC43 RNA SPEC QL NAA+PROBE: NOT DETECTED
HCT VFR BLD AUTO: 33.6 % (ref 34–46.6)
HGB BLD-MCNC: 9.9 G/DL (ref 12–15.9)
HMPV RNA NPH QL NAA+NON-PROBE: NOT DETECTED
HPIV1 RNA ISLT QL NAA+PROBE: NOT DETECTED
HPIV2 RNA SPEC QL NAA+PROBE: NOT DETECTED
HPIV3 RNA NPH QL NAA+PROBE: NOT DETECTED
HPIV4 P GENE NPH QL NAA+PROBE: NOT DETECTED
LYMPHOCYTES # BLD MANUAL: 16.26 10*3/MM3 (ref 0.7–3.1)
LYMPHOCYTES NFR BLD MANUAL: 6 % (ref 5–12)
M PNEUMO IGG SER IA-ACNC: NOT DETECTED
MCH RBC QN AUTO: 27.2 PG (ref 26.6–33)
MCHC RBC AUTO-ENTMCNC: 29.5 G/DL (ref 31.5–35.7)
MCV RBC AUTO: 92.3 FL (ref 79–97)
MONOCYTES # BLD: 1.35 10*3/MM3 (ref 0.1–0.9)
NEUTROPHILS # BLD AUTO: 4.06 10*3/MM3 (ref 1.7–7)
NEUTROPHILS NFR BLD MANUAL: 18 % (ref 42.7–76)
PLATELET # BLD AUTO: 210 10*3/MM3 (ref 140–450)
PMV BLD AUTO: 11.8 FL (ref 6–12)
POTASSIUM SERPL-SCNC: 3.9 MMOL/L (ref 3.5–5.2)
RBC # BLD AUTO: 3.64 10*6/MM3 (ref 3.77–5.28)
RHINOVIRUS RNA SPEC NAA+PROBE: NOT DETECTED
RSV RNA NPH QL NAA+NON-PROBE: NOT DETECTED
SARS-COV-2 RNA NPH QL NAA+NON-PROBE: NOT DETECTED
SCAN SLIDE: NORMAL
SMALL PLATELETS BLD QL SMEAR: ADEQUATE
SODIUM SERPL-SCNC: 141 MMOL/L (ref 136–145)
VARIANT LYMPHS NFR BLD MANUAL: 72 % (ref 19.6–45.3)
VIT B12 BLD-MCNC: 398 PG/ML (ref 211–946)
WBC MORPH BLD: NORMAL
WBC NRBC COR # BLD AUTO: 22.58 10*3/MM3 (ref 3.4–10.8)

## 2024-07-07 PROCEDURE — 25010000002 ENOXAPARIN PER 10 MG: Performed by: HOSPITALIST

## 2024-07-07 PROCEDURE — 25810000003 SODIUM CHLORIDE 0.9 % SOLUTION 250 ML FLEX CONT: Performed by: NURSE PRACTITIONER

## 2024-07-07 PROCEDURE — 94799 UNLISTED PULMONARY SVC/PX: CPT

## 2024-07-07 PROCEDURE — 97166 OT EVAL MOD COMPLEX 45 MIN: CPT

## 2024-07-07 PROCEDURE — 0202U NFCT DS 22 TRGT SARS-COV-2: CPT | Performed by: STUDENT IN AN ORGANIZED HEALTH CARE EDUCATION/TRAINING PROGRAM

## 2024-07-07 PROCEDURE — 97535 SELF CARE MNGMENT TRAINING: CPT

## 2024-07-07 PROCEDURE — 94761 N-INVAS EAR/PLS OXIMETRY MLT: CPT

## 2024-07-07 PROCEDURE — 25010000002 AZITHROMYCIN PER 500 MG: Performed by: NURSE PRACTITIONER

## 2024-07-07 PROCEDURE — 94664 DEMO&/EVAL PT USE INHALER: CPT

## 2024-07-07 PROCEDURE — 25010000002 CEFTRIAXONE PER 250 MG

## 2024-07-07 RX ORDER — BENZONATATE 100 MG/1
100 CAPSULE ORAL 3 TIMES DAILY PRN
Status: DISCONTINUED | OUTPATIENT
Start: 2024-07-07 | End: 2024-07-08 | Stop reason: HOSPADM

## 2024-07-07 RX ADMIN — IPRATROPIUM BROMIDE AND ALBUTEROL SULFATE 3 ML: .5; 3 SOLUTION RESPIRATORY (INHALATION) at 19:55

## 2024-07-07 RX ADMIN — Medication 10 ML: at 21:00

## 2024-07-07 RX ADMIN — IPRATROPIUM BROMIDE AND ALBUTEROL SULFATE 3 ML: .5; 3 SOLUTION RESPIRATORY (INHALATION) at 14:44

## 2024-07-07 RX ADMIN — IPRATROPIUM BROMIDE AND ALBUTEROL SULFATE 3 ML: .5; 3 SOLUTION RESPIRATORY (INHALATION) at 06:45

## 2024-07-07 RX ADMIN — LEVOTHYROXINE SODIUM 100 MCG: 0.1 TABLET ORAL at 06:00

## 2024-07-07 RX ADMIN — IPRATROPIUM BROMIDE AND ALBUTEROL SULFATE 3 ML: .5; 3 SOLUTION RESPIRATORY (INHALATION) at 11:57

## 2024-07-07 RX ADMIN — PANTOPRAZOLE SODIUM 40 MG: 40 TABLET, DELAYED RELEASE ORAL at 06:00

## 2024-07-07 RX ADMIN — DOCUSATE SODIUM 100 MG: 100 CAPSULE, LIQUID FILLED ORAL at 08:28

## 2024-07-07 RX ADMIN — LOSARTAN POTASSIUM 100 MG: 50 TABLET, FILM COATED ORAL at 08:28

## 2024-07-07 RX ADMIN — ENOXAPARIN SODIUM 70 MG: 100 INJECTION SUBCUTANEOUS at 08:28

## 2024-07-07 RX ADMIN — BENZONATATE 100 MG: 100 CAPSULE ORAL at 14:39

## 2024-07-07 RX ADMIN — BUDESONIDE 0.5 MG: 0.5 INHALANT RESPIRATORY (INHALATION) at 20:02

## 2024-07-07 RX ADMIN — LIDOCAINE 1 PATCH: 4 PATCH TOPICAL at 08:31

## 2024-07-07 RX ADMIN — ENOXAPARIN SODIUM 70 MG: 100 INJECTION SUBCUTANEOUS at 21:00

## 2024-07-07 RX ADMIN — CETIRIZINE HYDROCHLORIDE 10 MG: 10 TABLET, FILM COATED ORAL at 08:28

## 2024-07-07 RX ADMIN — SERTRALINE 150 MG: 100 TABLET, FILM COATED ORAL at 21:00

## 2024-07-07 RX ADMIN — ACETAMINOPHEN 650 MG: 325 TABLET, FILM COATED ORAL at 11:36

## 2024-07-07 RX ADMIN — BUDESONIDE 0.5 MG: 0.5 INHALANT RESPIRATORY (INHALATION) at 06:52

## 2024-07-07 RX ADMIN — DOCUSATE SODIUM 100 MG: 100 CAPSULE, LIQUID FILLED ORAL at 21:00

## 2024-07-07 RX ADMIN — AMLODIPINE BESYLATE 10 MG: 5 TABLET ORAL at 08:28

## 2024-07-07 RX ADMIN — ROPINIROLE HYDROCHLORIDE 0.25 MG: 0.25 TABLET, FILM COATED ORAL at 21:00

## 2024-07-07 RX ADMIN — Medication 10 ML: at 08:29

## 2024-07-07 RX ADMIN — ROSUVASTATIN 10 MG: 10 TABLET, FILM COATED ORAL at 21:00

## 2024-07-07 RX ADMIN — AZITHROMYCIN MONOHYDRATE 500 MG: 500 INJECTION, POWDER, LYOPHILIZED, FOR SOLUTION INTRAVENOUS at 20:59

## 2024-07-07 RX ADMIN — MIRTAZAPINE 15 MG: 15 TABLET, FILM COATED ORAL at 21:00

## 2024-07-07 RX ADMIN — CEFTRIAXONE 1000 MG: 1 INJECTION, POWDER, FOR SOLUTION INTRAMUSCULAR; INTRAVENOUS at 17:25

## 2024-07-07 NOTE — PLAN OF CARE
Problem: Adult Inpatient Plan of Care  Goal: Plan of Care Review  Outcome: Ongoing, Progressing  Goal: Patient-Specific Goal (Individualized)  Outcome: Ongoing, Progressing  Goal: Absence of Hospital-Acquired Illness or Injury  Outcome: Ongoing, Progressing  Intervention: Identify and Manage Fall Risk  Recent Flowsheet Documentation  Taken 7/7/2024 0200 by Radha Matute RN  Safety Promotion/Fall Prevention:   safety round/check completed   room organization consistent   fall prevention program maintained   assistive device/personal items within reach   clutter free environment maintained  Taken 7/7/2024 0000 by Radha Matute RN  Safety Promotion/Fall Prevention:   safety round/check completed   room organization consistent   fall prevention program maintained   clutter free environment maintained   assistive device/personal items within reach  Taken 7/6/2024 2200 by Radha Matute RN  Safety Promotion/Fall Prevention:   safety round/check completed   room organization consistent   fall prevention program maintained   clutter free environment maintained   assistive device/personal items within reach  Taken 7/6/2024 2000 by Radha Matute RN  Safety Promotion/Fall Prevention:   safety round/check completed   room organization consistent   fall prevention program maintained   clutter free environment maintained   assistive device/personal items within reach  Intervention: Prevent Skin Injury  Recent Flowsheet Documentation  Taken 7/7/2024 0200 by Radha Matute RN  Body Position: position changed independently  Taken 7/7/2024 0000 by Radha Matute RN  Body Position: position changed independently  Taken 7/6/2024 2200 by Radha Matute RN  Body Position: position changed independently  Taken 7/6/2024 2000 by Radha Matute RN  Body Position: position changed independently  Skin Protection: adhesive use limited  Intervention: Prevent and Manage VTE (Venous Thromboembolism)  Risk  Recent Flowsheet Documentation  Taken 7/6/2024 2000 by Radha Matute RN  Activity Management: activity encouraged  Range of Motion: active ROM (range of motion) encouraged  Intervention: Prevent Infection  Recent Flowsheet Documentation  Taken 7/7/2024 0200 by Radha Matute RN  Infection Prevention:   single patient room provided   rest/sleep promoted   equipment surfaces disinfected   personal protective equipment utilized   hand hygiene promoted   environmental surveillance performed  Taken 7/7/2024 0000 by Radha Matute RN  Infection Prevention:   single patient room provided   rest/sleep promoted   personal protective equipment utilized   hand hygiene promoted   equipment surfaces disinfected   environmental surveillance performed  Taken 7/6/2024 2200 by Radha Matute RN  Infection Prevention:   single patient room provided   rest/sleep promoted   personal protective equipment utilized   hand hygiene promoted   equipment surfaces disinfected   environmental surveillance performed  Taken 7/6/2024 2000 by Radha Matute RN  Infection Prevention:   single patient room provided   rest/sleep promoted   personal protective equipment utilized   hand hygiene promoted   equipment surfaces disinfected   environmental surveillance performed  Goal: Optimal Comfort and Wellbeing  Outcome: Ongoing, Progressing  Intervention: Provide Person-Centered Care  Recent Flowsheet Documentation  Taken 7/7/2024 0000 by Radha Matute RN  Trust Relationship/Rapport: care explained  Taken 7/6/2024 2000 by Radha Matute RN  Trust Relationship/Rapport:   care explained   choices provided  Goal: Readiness for Transition of Care  Outcome: Ongoing, Progressing     Problem: Fall Injury Risk  Goal: Absence of Fall and Fall-Related Injury  Outcome: Ongoing, Progressing  Intervention: Identify and Manage Contributors  Recent Flowsheet Documentation  Taken 7/7/2024 0200 by Radha Matute  RN  Medication Review/Management:   medications reviewed   high-risk medications identified  Taken 7/7/2024 0000 by Radha Matute RN  Medication Review/Management:   medications reviewed   high-risk medications identified  Taken 7/6/2024 2200 by Radha Matute RN  Medication Review/Management:   medications reviewed   high-risk medications identified  Taken 7/6/2024 2000 by Radha Matute RN  Medication Review/Management: medications reviewed  Intervention: Promote Injury-Free Environment  Recent Flowsheet Documentation  Taken 7/7/2024 0200 by Radha Matute RN  Safety Promotion/Fall Prevention:   safety round/check completed   room organization consistent   fall prevention program maintained   assistive device/personal items within reach   clutter free environment maintained  Taken 7/7/2024 0000 by Radha Matute RN  Safety Promotion/Fall Prevention:   safety round/check completed   room organization consistent   fall prevention program maintained   clutter free environment maintained   assistive device/personal items within reach  Taken 7/6/2024 2200 by Radha Matute RN  Safety Promotion/Fall Prevention:   safety round/check completed   room organization consistent   fall prevention program maintained   clutter free environment maintained   assistive device/personal items within reach  Taken 7/6/2024 2000 by Radha Matute RN  Safety Promotion/Fall Prevention:   safety round/check completed   room organization consistent   fall prevention program maintained   clutter free environment maintained   assistive device/personal items within reach     Problem: Infection  Goal: Absence of Infection Signs and Symptoms  Outcome: Ongoing, Progressing  Intervention: Prevent or Manage Infection  Recent Flowsheet Documentation  Taken 7/7/2024 0200 by Radha Matute RN  Isolation Precautions: precautions maintained  Taken 7/7/2024 0000 by Radha Matute RN  Isolation Precautions:  precautions maintained  Taken 7/6/2024 2200 by Radha Matute RN  Isolation Precautions: precautions maintained  Taken 7/6/2024 2000 by Radha Matute RN  Isolation Precautions: precautions maintained   Goal Outcome Evaluation:

## 2024-07-07 NOTE — THERAPY TREATMENT NOTE
Patient Name: Diane Guan  : 1940    MRN: [id number]                              Today's Date: 2024       Admit Date: 2024    Visit Dx: No diagnosis found.  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    Acquired hypothyroidism    Type 2 diabetes mellitus with stage 3b chronic kidney disease, without long-term current use of insulin    Insomnia    Postmenopausal status    Shoulder pain    Overactive bladder    Vitamin B 12 deficiency    Seasonal allergies    Absolute anemia    Vitamin D deficiency    LIBBY (obstructive sleep apnea)    Nocturnal hypoxia    Acute respiratory failure with hypoxia and hypercapnia    Abdominal pain    COPD (chronic obstructive pulmonary disease)    CKD (chronic kidney disease) stage 3, GFR 30-59 ml/min    Sepsis due to pneumonia    Acute on chronic respiratory failure with hypoxemia    Class 1 obesity due to excess calories with serious comorbidity and body mass index (BMI) of 32.0 to 32.9 in adult    Acute hypoxemic respiratory failure    History of breast cancer    Personal history of CLL (chronic lymphocytic leukemia)    History of cigarette smoking    Fall at home    Rhinovirus infection    Acute respiratory failure with hypoxia    Hyperkalemia    Respiratory failure     Past Medical History:   Diagnosis Date    Acute bronchitis due to human metapneumovirus 2020    Anxiety disorder     Arthritis     Breast cancer 2019    Invasive ductal carcinoma    Chronic lymphocytic leukemia (CLL), B-cell 2019    Depression     Disease of thyroid gland     Diverticulosis of colon     Identified on colonoscopy    Dysphagia 2020    Dyspnea on  exertion     Fall at home 10/11/2023    Hemorrhoid     Hiatal hernia 12/2020    Hiatal hernia with GERD     large hiatal hernia with high-grade reflux on barium swallow; s/p laparoscopic fundoplication with gastropexy    History of breast cancer 10/11/2023    History of cigarette smoking 10/11/2023    History of diabetes mellitus     no meds now    Hyperlipidemia     Hypertension     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    aspiration pneumonia; completed abx therapy    OAB (overactive bladder)     Personal history of CLL (chronic lymphocytic leukemia) 10/11/2023    Skin cancer     Sleep apnea     daughter states pt does not have and doesn't have machine     Past Surgical History:   Procedure Laterality Date    BLADDER SURGERY      BREAST BIOPSY      BRONCHOSCOPY N/A 09/13/2019    Procedure: BRONCHOSCOPY with bronchial washing;  Surgeon: Marnie Frankel MD;  Location: [facility] ENDOSCOPY;  Service: Pulmonary    BRONCHOSCOPY Bilateral 10/18/2023    Procedure: BRONCHOSCOPY AT BEDSIDE;  Surgeon: Vinicius Heredia DO;  Location: [facility] ENDOSCOPY;  Service: Pulmonary;  Laterality: Bilateral;    COLONOSCOPY  07/19/2018    severe diverticulosis    CYST REMOVAL      Removed a fatty cyst off of her back    ENDOSCOPY N/A 11/23/2020    Procedure: ESOPHAGOGASTRODUODENOSCOPY with dilatation (Bougie # 48, 50, 52, 54, 56, 58);  Surgeon: Den Plummer DO;  Location: [facility] ENDOSCOPY;  Service: General;  Laterality: N/A;  Post: large hiatal hernia, joshua's ulcers    HIATAL HERNIA REPAIR N/A 12/30/2020    Procedure: Laparoscopic hiatal hernia repair with gastropexy;  Surgeon: Den Plummer DO;  Location: [facility] MAIN OR;  Service: General;  Laterality: N/A;    MASTECTOMY Right 02/04/2019    Invasive ductal carcinoma    TUBAL ABDOMINAL LIGATION        General Information       Row Name 07/07/24 0752          General Information    Prior Level of Function independent:  uses Rolator or RW occassioonally & O2 2L at night &  occasssionally during the day. COPD  -     Existing Precautions/Restrictions fall;other (see comments)  rt side mastectomy  -     Barriers to Rehab medically complex;previous functional deficit  -       Row Name 07/07/24 0752          Living Environment    People in Home spouse  he has early dementia & is unable to drive  -       Row Name 07/07/24 0752          Home Main Entrance    Number of Stairs, Main Entrance three  -     Stair Railings, Main Entrance none  but there is a shef and wll to hold to going into the house  -       Row Name 07/07/24 0752          Stairs Within Home, Primary    Stairs, Within Home, Primary might go to dtr's home at d/c and there too is one level.  -     Number of Stairs, Within Home, Primary none  -       Row Name 07/07/24 0916          Cognition    Orientation Status (Cognition) oriented x 4;verbal cues/prompts needed for orientation;other (see comments)  dtr reports she manages her mother's medications since her mastectomy.  -       Row Name 07/07/24 0916          Safety Issues, Functional Mobility    Safety Issues Affecting Function (Mobility) problem-solving;sequencing abilities;safety precaution awareness  -     Impairments Affecting Function (Mobility) balance;endurance/activity tolerance;shortness of breath;pain  -               User Key  (r) = Recorded By, (t) = Taken By, (c) = Cosigned By      Initials Name Provider Type     Daja Morrison OT Occupational Therapist                     Mobility/ADL's       Row Name 07/07/24 0917          Bed Mobility    Bed Mobility supine-sit  -     Supine-Sit [place] (Bed Mobility) minimum assist (75% patient effort)  -     Assistive Device (Bed Mobility) head of bed elevated;bed rails  -       Row Name 07/07/24 0917          Transfers    Transfers sit-stand transfer;stand-sit transfer  -       Row Name 07/07/24 0917          Sit-Stand Transfer    Sit-Stand [place] (Transfers) set up;standby assist   -     Assistive Device (Sit-Stand Transfers) walker, front-wheeled  -     Comment, (Sit-Stand Transfer) normaly does not use walker but has them PRN  -       Row Name 07/07/24 0917          Stand-Sit Transfer    Stand-Sit [place] (Transfers) set up;standby assist  -     Assistive Device (Stand-Sit Transfers) walker, front-wheeled  -       Row Name 07/07/24 0917          Functional Mobility    Functional Mobility- Ind. Level standby assist  -     Functional Mobility- Device walker, front-wheeled  -     Patient was able to Ambulate yes  -       Row Name 07/07/24 0917          Activities of Daily Living    BADL Assessment/Intervention lower body dressing;grooming;toileting  -       Row Name 07/07/24 0917          Lower Body Dressing Assessment/Training    [place] Level (Lower Body Dressing) don;socks;set up  -     Position (Lower Body Dressing) edge of bed sitting  -       Row Name 07/07/24 0917          Grooming Assessment/Training    [place] Level (Grooming) oral care regimen;wash face, hands;contact guard assist;verbal cues  -     Position (Grooming) sink side;supported standing  -     Comment, (Grooming) CGA at sink initially due to several mild posterior leans but no LOB. Min cues due perhaps to low vision though pt declines her glasses, or hearing aides. Dtr reports this is typical that she does not like these aides.  -       Row Name 07/07/24 0917          Toileting Assessment/Training    [place] Level (Toileting) toileting skills;set up;standby assist;other (see comments)  dtr has been helping chichi pt up to the BSC placed across the room. Neither person reports problems with this arrangement.  -               User Key  (r) = Recorded By, (t) = Taken By, (c) = Cosigned By      Initials Name Provider Type     Daja Morrison, OT Occupational Therapist                   Obj/Interventions       Row Name 07/07/24 0920          Sensory Assessment (Somatosensory)     Sensory Assessment (Somatosensory) sensation intact  -[facility] Name 07/07/24 0920          Vision Assessment/Intervention    Visual Impairment/Limitations corrective lenses full-time  -[facility] Name 07/07/24 0920          Range of Motion Comprehensive    General Range of Motion no range of motion deficits identified  -MH       Row Name 07/07/24 0920          Strength Comprehensive (MMT)    Comment, General Manual Muscle Testing (MMT) Assessment mild global weakness  -               User Key  (r) = Recorded By, (t) = Taken By, (c) = Cosigned By      Initials Name Provider Type     Daja Morrison, OT Occupational Therapist                   Goals/Plan       Row Name 07/07/24 0927          Bed Mobility Goal 1 (OT)    Activity/Assistive Device (Bed Mobility Goal 1, OT) bed mobility activities, all  -     [place] Level/Cues Needed (Bed Mobility Goal 1, OT) independent  -     Time Frame (Bed Mobility Goal 1, OT) 1 week  -MH       Row Name 07/07/24 0927          Transfer Goal 1 (OT)    Activity/Assistive Device (Transfer Goal 1, OT) transfers, all  -     [place] Level/Cues Needed (Transfer Goal 1, OT) modified independence  -     Time Frame (Transfer Goal 1, OT) 1 week  -MH       Row Name 07/07/24 0927          Bathing Goal 1 (OT)    Activity/Device (Bathing Goal 1, OT) bathing skills, all  -MH     [place] Level/Cues Needed (Bathing Goal 1, OT) set-up required;supervision required  -     Time Frame (Bathing Goal 1, OT) 2 weeks  -MH       Row Name 07/07/24 0927          Dressing Goal 1 (OT)    Activity/Device (Dressing Goal 1, OT) dressing skills, all  -MH     [place]/Cues Needed (Dressing Goal 1, OT) set-up required;supervision required  -     Time Frame (Dressing Goal 1, OT) 10 days  -MH       Row Name 07/07/24 0927          Therapy Assessment/Plan (OT)    Planned Therapy Interventions (OT) activity tolerance training;adaptive equipment training;BADL retraining;cognitive/visual  perception retraining;functional balance retraining;transfer/mobility retraining;occupation/activity based interventions;ROM/therapeutic exercise;patient/caregiver education/training  -               User Key  (r) = Recorded By, (t) = Taken By, (c) = Cosigned By      Initials Name Provider Type     Daja Morrison OT Occupational Therapist                   Clinical Impression       Row Name 07/07/24 0962          Pain Assessment    Pretreatment Pain Rating 0/10 - no pain  -     Posttreatment Pain Rating 5/10  chronic low back pain after stooping at sink  -     Pain Intervention(s) Repositioned;Ambulation/increased activity;Therapeutic presence;Emotional support;Nursing Notified  -       Row Name 07/07/24 0920          Plan of Care Review    Plan of Care Reviewed With patient;daughter  -     Progress improving  -     Outcome Evaluation Pt is 85 y/o with COPD admitted with several days of SOA. Pt is found to have 3 small PE and has been anticoagulated for 2 days. Pt lives with spouse who is forgetful and no longer drives. Dtr at bedside report spouse is not able to assist pt at home but pt's 2 dtrs stand ready to assist as neded. Pt may go to dtr's home w/ 3 SAMY or her own home also w/ 3 SAMY and either place will have 24 hour supervisoin and care until feeling better. Pt normally duffy snot use her walkersm, and she cooks, cleans, and drives. This brandon pt needs min (A) to get OOB, etup and CGA to move to the sink w/ RW, remain standing to complete oral, denture care. Noted to have back pain with standing and low functional endurance. Pt adamantly declines HHC or any kind of IP rehab. Plans home with 24 hr family support. Pt is A&O X4 with minimal cues and did need one safey cue when turning from the sink and forgetting the RW which was still arond her body. Pt has )2 at home and can use it all the time if needed. Previously only using it at night.  -       Row Name 07/07/24 0982          Therapy  Assessment/Plan (OT)    Rehab Potential (OT) good, to achieve stated therapy goals  -     Criteria for Skilled Therapeutic Interventions Met (OT) skilled treatment is necessary  -     Therapy Frequency (OT) 5 times/wk  -     Predicted Duration of Therapy Intervention (OT) until d/c  -       Row Name 07/07/24 0920          Therapy Plan Review/Discharge Plan (OT)    Anticipated Discharge Disposition (OT) home with 24/7 care  -       Row Name 07/07/24 0920          Vital Signs    O2 Delivery Pre Treatment supplemental O2  -MH     O2 Delivery Intra Treatment supplemental O2  -MH     O2 Delivery Post Treatment supplemental O2  -MH     Pre Patient Position Supine  -MH     Intra Patient Position Standing  -MH     Post Patient Position Supine  -MH       Row Name 07/07/24 0920          Positioning and Restraints    Pre-Treatment Position in bed  -MH     Post Treatment Position bed  -MH     In Bed notified nsg;fowlers;with family/caregiver;encouraged to call for assist;call light within reach  -               User Key  (r) = Recorded By, (t) = Taken By, (c) = Cosigned By      Initials Name Provider Type    Daja James, OT Occupational Therapist                   Outcome Measures       Row Name 07/07/24 0928          How much help from another person do you currently need...    Turning from your back to your side while in flat bed without using bedrails? 3  -MH     Moving from lying on back to sitting on the side of a flat bed without bedrails? 3  -MH     Moving to and from a bed to a chair (including a wheelchair)? 3  -MH     Standing up from a chair using your arms (e.g., wheelchair, bedside chair)? 4  -MH     Climbing 3-5 steps with a railing? 3  -MH     To walk in hospital room? 3  -MH     AM-PAC 6 Clicks Score (PT) 19  -     Highest Level of Mobility Goal 6 --> Walk 10 steps or more  -               User Key  (r) = Recorded By, (t) = Taken By, (c) = Cosigned By      Initials Name Provider Type    SYLVIA  Daja Morrison OT Occupational Therapist                    Occupational Therapy Education       Title: PT OT SLP Therapies (Done)       Topic: Occupational Therapy (Done)       Point: ADL training (Done)       Description:   Instruct learner(s) on proper safety adaptation and remediation techniques during self care or transfers.   Instruct in proper use of assistive devices.                  Learning Progress Summary             Patient Acceptance, E,TB, VU by  at 7/7/2024 0928   Family Acceptance, E,TB, VU by  at 7/7/2024 0928                         Point: Home exercise program (Done)       Description:   Instruct learner(s) on appropriate technique for monitoring, assisting and/or progressing therapeutic exercises/activities.                  Learning Progress Summary             Patient Acceptance, E,TB, VU by  at 7/7/2024 0928   Family Acceptance, E,TB, VU by  at 7/7/2024 0928                         Point: Precautions (Done)       Description:   Instruct learner(s) on prescribed precautions during self-care and functional transfers.                  Learning Progress Summary             Patient Acceptance, E,TB, VU by  at 7/7/2024 0928   Family Acceptance, E,TB, VU by  at 7/7/2024 0928                         Point: Body mechanics (Done)       Description:   Instruct learner(s) on proper positioning and spine alignment during self-care, functional mobility activities and/or exercises.                  Learning Progress Summary             Patient Acceptance, E,TB, VU by  at 7/7/2024 0928   Family Acceptance, E,TB, VU by  at 7/7/2024 0928                                         User Key       Initials Effective Dates Name Provider Type Discipline     06/16/21 -  Daja Morrison OT Occupational Therapist OT                  OT Recommendation and Plan  Planned Therapy Interventions (OT): activity tolerance training, adaptive equipment training, BADL retraining, cognitive/visual perception  retraining, functional balance retraining, transfer/mobility retraining, occupation/activity based interventions, ROM/therapeutic exercise, patient/caregiver education/training  Therapy Frequency (OT): 5 times/wk  Plan of Care Review  Plan of Care Reviewed With: patient, daughter  Progress: improving  Outcome Evaluation: Pt is 83 y/o with COPD admitted with several days of SOA. Pt is found to have 3 small PE and has been anticoagulated for 2 days. Pt lives with spouse who is forgetful and no longer drives. Dtr at bedside report spouse is not able to assist pt at home but pt's 2 dtrs stand ready to assist as neded. Pt may go to dtr's home w/ 3 SAMY or her own home also w/ 3 SAMY and either place will have 24 hour supervisoin and care until feeling better. Pt normally duffy snot use her walkersm, and she cooks, cleans, and drives. This brandon pt needs min (A) to get OOB, etup and CGA to move to the sink w/ RW, remain standing to complete oral, denture care. Noted to have back pain with standing and low functional endurance. Pt adamantly declines HHC or any kind of IP rehab. Plans home with 24 hr family support. Pt is A&O X4 with minimal cues and did need one safey cue when turning from the sink and forgetting the RW which was still arond her body. Pt has )2 at home and can use it all the time if needed. Previously only using it at night.     Time Calculation:         Time Calculation- OT       Row Name 07/07/24 0928             Time Calculation-     OT Start Time 0745  -      OT Stop Time 0819  -      OT Time Calculation (min) 34 min  -      Total Timed Code Minutes- OT 15 minute(s)  -      OT Received On 07/07/24  -      OT - Next Appointment 07/08/24  -      OT Goal Re-Cert Due Date 07/21/24  -                User Key  (r) = Recorded By, (t) = Taken By, (c) = Cosigned By      Initials Name Provider Type    Daja James OT Occupational Therapist                  Therapy Charges for Today       Code  Description Service Date Service Provider Modifiers Qty    77626316539 HC OT SELF CARE/MGMT/TRAIN EA 15 MIN 7/7/2024 Daja Morrison OT GO 1    [id number] HC OT EVAL MOD COMPLEXITY 3 7/7/2024 Daja Morrison OT GO 1                 Daja Morrison OT  7/7/2024

## 2024-07-07 NOTE — PROGRESS NOTES
"PULMONARY CRITICAL CARE PROGRESS  NOTE      PATIENT IDENTIFICATION:  Name: Diane Guan  MRN: [id number]  :  1940     Age: 84 y.o.  Sex: female    DATE OF Note:  2024   Referring Physician: Bhavin Crawley MD                  Subjective:   In bed resting,  no SOB, no chest or abd pain, no bowel or bladder issues, no complaints      Objective:  tMax 24 hrs: Temp (24hrs), Av.9 °F (36.6 °C), Min:97.8 °F (36.6 °C), Max:98.1 °F (36.7 °C)      Vitals Ranges:   Temp:  [97.8 °F (36.6 °C)-98.1 °F (36.7 °C)] 97.8 °F (36.6 °C)  Heart Rate:  [66-77] 77  Resp:  [17-26] 21  BP: (120-161)/(62-76) 142/66    Intake and Output Last 3 Shifts:   I/O last 3 completed shifts:  In: 1550 [P.O.:1200; IV Piggyback:350]  Out: -     Exam:  /66 (BP Location: Left arm, Patient Position: Lying)   Pulse 77   Temp 97.8 °F (36.6 °C) (Oral)   Resp 21   Ht 160 cm (63\")   Wt 74.8 kg (165 lb)   SpO2 94%   BMI 29.23 kg/m²     General Appearance:   Alert  HEENT:  Normocephalic, without obvious abnormality. Conjunctivae/corneas clear.  Normal external ear canals. Nares normal, no drainage     Neck:  Supple, symmetrical, trachea midline. No JVD.  Lungs /Chest wall:  rhonchi improving,  respirations unlabored, symmetrical wall movement.     Heart:  Regular rate and rhythm, systolic murmur PMI left sternal border  Abdomen: Soft, nontender, no masses, no organomegaly.    Extremities: Trace edema, no clubbing or cyanosis        Medications:  amLODIPine, 10 mg, Oral, Daily  azithromycin, 500 mg, Intravenous, Q24H  budesonide, 0.5 mg, Nebulization, BID - RT  cefTRIAXone, 1,000 mg, Intravenous, Q24H  cetirizine, 10 mg, Oral, Daily  docusate sodium, 100 mg, Oral, BID  enoxaparin, 1 mg/kg, Subcutaneous, Q12H  ferrous sulfate, 324 mg, Oral, Once per day on   ipratropium-albuterol, 3 mL, Nebulization, 4x Daily - RT  levothyroxine, 100 mcg, Oral, Q AM  Lidocaine, 1 patch, Transdermal, Q24H  losartan, 100 mg, " Oral, Q24H  mirtazapine, 15 mg, Oral, Nightly  pantoprazole, 40 mg, Oral, Q AM  rOPINIRole, 0.25 mg, Oral, Nightly  rosuvastatin, 10 mg, Oral, Nightly  sertraline, 150 mg, Oral, Nightly  sodium chloride, 10 mL, Intravenous, Q12H        Infusion:        PRN:    acetaminophen **OR** acetaminophen **OR** acetaminophen    benzonatate    senna-docusate sodium **AND** polyethylene glycol **AND** bisacodyl **AND** bisacodyl    Calcium Replacement - Follow Nurse / BPA Driven Protocol    ipratropium-albuterol    Magnesium Standard Dose Replacement - Follow Nurse / BPA Driven Protocol    melatonin    metoclopramide    Phosphorus Replacement - Follow Nurse / BPA Driven Protocol    Potassium Replacement - Follow Nurse / BPA Driven Protocol    sodium chloride    sodium chloride  Data Review:  All labs (24hrs):   Recent Results (from the past 24 hour(s))   Adult Transthoracic Echo Complete W/ Cont if Necessary Per Protocol    Collection Time: 07/06/24  2:59 PM   Result Value Ref Range    LVIDd 4.6 cm    LVIDs 3.3 cm    IVSd 1.20 cm    LVPWd 1.10 cm    FS 28.3 %    IVS/LVPW 1.09 cm    ESV(cubed) 35.9 ml    LV Sys Vol (BSA corrected) 13.0 cm2    EDV(cubed) 97.3 ml    LV Leonard Vol (BSA corrected) 34.3 cm2    LV mass(C)d 192.9 grams    LVOT area 3.1 cm2    LVOT diam 2.00 cm    EDV(MOD-sp4) 62.9 ml    ESV(MOD-sp4) 23.9 ml    SV(MOD-sp4) 39.0 ml    SVi(MOD-SP4) 21.3 ml/m2    SVi (LVOT) 24.2 ml/m2    EF(MOD-sp4) 62.0 %    MV E max garry 63.0 cm/sec    MV A max garry 126.0 cm/sec    MV dec time 0.21 sec    MV E/A 0.50     LA ESV Index (BP) 18.7 ml/m2    Med Peak E' Garry 4.9 cm/sec    Lat Peak E' Garry 5.2 cm/sec    TR max garry 186.0 cm/sec    Avg E/e' ratio 12.48     SV(LVOT) 44.3 ml    TAPSE (>1.6) 2.42 cm    RV S' 13.3 cm/sec    LA dimension (2D)  4.3 cm    LV V1 max 71.8 cm/sec    LV V1 max PG 2.06 mmHg    LV V1 mean PG 1.00 mmHg    LV V1 VTI 14.1 cm    Ao pk garry 195.0 cm/sec    Ao max PG 15.2 mmHg    Ao mean PG 8.0 mmHg    Ao V2 VTI 35.9 cm     EDILMA(I,D) 1.23 cm2    MV max PG 6.7 mmHg    MV mean PG 2.00 mmHg    MV V2 VTI 24.8 cm    MV P1/2t 77.7 msec    MVA(P1/2t) 2.8 cm2    MVA(VTI) 1.79 cm2    MV dec slope 271.0 cm/sec2    MR max mindy 506.0 cm/sec    MR max .4 mmHg    TR max PG 13.8 mmHg    RV V1 max PG 1.29 mmHg    RV V1 max 56.7 cm/sec    RV V1 VTI 10.5 cm    PA V2 max 94.9 cm/sec    PI end-d mindy 93.4 cm/sec    Sinus 3.1 cm    EF(MOD-bp) 62.0 %   Basic Metabolic Panel    Collection Time: 07/06/24 11:23 PM    Specimen: Arm, Left; Blood   Result Value Ref Range    Glucose 135 (H) 65 - 99 mg/dL    BUN 28 (H) 8 - 23 mg/dL    Creatinine 1.30 (H) 0.57 - 1.00 mg/dL    Sodium 141 136 - 145 mmol/L    Potassium 3.9 3.5 - 5.2 mmol/L    Chloride 110 (H) 98 - 107 mmol/L    CO2 18.7 (L) 22.0 - 29.0 mmol/L    Calcium 8.6 8.6 - 10.5 mg/dL    BUN/Creatinine Ratio 21.5 7.0 - 25.0    Anion Gap 12.3 5.0 - 15.0 mmol/L    eGFR 40.9 (L) >60.0 mL/min/1.73   CBC Auto Differential    Collection Time: 07/06/24 11:23 PM    Specimen: Arm, Left; Blood   Result Value Ref Range    WBC 22.58 (H) 3.40 - 10.80 10*3/mm3    RBC 3.64 (L) 3.77 - 5.28 10*6/mm3    Hemoglobin 9.9 (L) 12.0 - 15.9 g/dL    Hematocrit 33.6 (L) 34.0 - 46.6 %    MCV 92.3 79.0 - 97.0 fL    MCH 27.2 26.6 - 33.0 pg    MCHC 29.5 (L) 31.5 - 35.7 g/dL    RDW 15.1 12.3 - 15.4 %    RDW-SD 50.7 37.0 - 54.0 fl    MPV 11.8 6.0 - 12.0 fL    Platelets 210 140 - 450 10*3/mm3   Scan Slide    Collection Time: 07/06/24 11:23 PM    Specimen: Arm, Left; Blood   Result Value Ref Range    Scan Slide     Manual Differential    Collection Time: 07/06/24 11:23 PM    Specimen: Arm, Left; Blood   Result Value Ref Range    Neutrophil % 18.0 (L) 42.7 - 76.0 %    Lymphocyte % 72.0 (H) 19.6 - 45.3 %    Monocyte % 6.0 5.0 - 12.0 %    Eosinophil % 3.0 0.3 - 6.2 %    Basophil % 1.0 0.0 - 1.5 %    Neutrophils Absolute 4.06 1.70 - 7.00 10*3/mm3    Lymphocytes Absolute 16.26 (H) 0.70 - 3.10 10*3/mm3    Monocytes Absolute 1.35 (H) 0.10 - 0.90  10*3/mm3    Eosinophils Absolute 0.68 (H) 0.00 - 0.40 10*3/mm3    Basophils Absolute 0.23 (H) 0.00 - 0.20 10*3/mm3    Anisocytosis Slight/1+ None Seen    WBC Morphology Normal Normal    Platelet Estimate Adequate Normal   Respiratory Panel PCR w/COVID-19(SARS-CoV-2) LESLEE/VIVIANE/PAVAN/PAD/COR/JUAN DAVID In-House, NP Swab in UTM/VTM, 2 HR TAT - Swab, Nasopharynx    Collection Time: 07/07/24  9:53 AM    Specimen: Nasopharynx; Swab   Result Value Ref Range    ADENOVIRUS, PCR Not Detected Not Detected    Coronavirus 229E Not Detected Not Detected    Coronavirus HKU1 Not Detected Not Detected    Coronavirus NL63 Not Detected Not Detected    Coronavirus OC43 Not Detected Not Detected    COVID19 Not Detected Not Detected - Ref. Range    Human Metapneumovirus Not Detected Not Detected    Human Rhinovirus/Enterovirus Not Detected Not Detected    Influenza A PCR Not Detected Not Detected    Influenza B PCR Not Detected Not Detected    Parainfluenza Virus 1 Not Detected Not Detected    Parainfluenza Virus 2 Not Detected Not Detected    Parainfluenza Virus 3 Not Detected Not Detected    Parainfluenza Virus 4 Not Detected Not Detected    RSV, PCR Not Detected Not Detected    Bordetella pertussis pcr Not Detected Not Detected    Bordetella parapertussis PCR Not Detected Not Detected    Chlamydophila pneumoniae PCR Not Detected Not Detected    Mycoplasma pneumo by PCR Not Detected Not Detected        Imaging:  Adult Transthoracic Echo Complete W/ Cont if Necessary Per Protocol    Left ventricular systolic function is normal. Calculated left   ventricular EF = 62%    Left ventricular wall thickness is consistent with mild concentric   hypertrophy.    Left ventricular diastolic function is consistent with (grade Ia w/high   LAP) impaired relaxation.    The left atrial cavity is mild to moderately dilated.    There is mild calcification of the aortic valve.    Transthoracic echocardiography reveals mild LVH with EF of 62%.  Diastolic   dysfunction  criteria noted.  Mild to moderate left atrial enlargement.    Mild AI and no effusion.    Electronically signed by Lucho Rodriguez MD, 07/06/24, 3:40 PM EDT.  Duplex Venous Lower Extremity - Bilateral CAR    Acute left lower extremity deep vein thrombosis noted in the   gastrocnemius.    All other veins appeared normal bilaterally.       ASSESSMENT:  Acute hypoxic respiratory failure   Acute PE  COPD exacerbation  LIBBY  DM II  HTN  CKD  Hx breast cancer  Hx CLL  GERD  Hx MAC infection   Elevated d-dimer  LEXUS  Leukocytosis 25.3           PLAN:  Encourage OOB more and use IS more   Antibiotics  Bronchodilators  Inhaled corticosteroids  Incentive spirometer  O2 support   Electrolytes/ glycemic control  DVT prophylaxis-Lovenox     Discussed with Dr Krish Maddox, APRN   7/7/2024  11:56 EDT   I personally have examined  and interviewed the patient. I have reviewed the history, data, problems, assessment and plan with our NP.  Total Critical care time in direct medical management (   ) minutes, This time specifically excludes time spent performing procedures.    Wily Rutherford MD   7/7/2024  12:47 EDT

## 2024-07-07 NOTE — PLAN OF CARE
Goal Outcome Evaluation:  Plan of Care Reviewed With: patient, daughter        Progress: improving  Outcome Evaluation: Pt is 83 y/o with COPD admitted with several days of SOA. Pt is found to have 3 small PE and has been anticoagulated for 2 days. Pt lives with spouse who is forgetful and no longer drives. Dtr at bedside report spouse is not able to assist pt at home but pt's 2 dtrs stand ready to assist as neded. Pt may go to dtr's home w/ 3 SAMY or her own home also w/ 3 SAMY and either place will have 24 hour supervisoin and care until feeling better. Pt normally duffy snot use her walkersm, and she cooks, cleans, and drives. This brandon pt needs min (A) to get OOB, etup and CGA to move to the sink w/ RW, remain standing to complete oral, denture care. Noted to have back pain with standing and low functional endurance. Pt adamantly declines HHC or any kind of IP rehab. Plans home with 24 hr family support. Pt is A&O X4 with minimal cues and did need one safey cue when turning from the sink and forgetting the RW which was still arond her body. Pt has )2 at home and can use it all the time if needed. Previously only using it at night.      Anticipated Discharge Disposition (OT): home with 24/7 care

## 2024-07-07 NOTE — PROGRESS NOTES
[facility] MEDICINE SERVICE  DAILY PROGRESS NOTE    NAME: Diane Guan  : 1940  MRN: [id number]      LOS: 2 days     PROVIDER OF SERVICE: Raul Orellana MD    Chief Complaint: Acute respiratory failure with hypoxia    Subjective:     Interval History:  History taken from: patient    No acute overnight events. Patient reported non-productive cough, SOB, denies chest pain, fever or chills.     Review of Systems:   Review of Systems: All ROS negative except SOB, cough.     Objective:     Vital Signs  Temp:  [97.8 °F (36.6 °C)-98.8 °F (37.1 °C)] 97.8 °F (36.6 °C)  Heart Rate:  [66-79] 69  Resp:  [17-26] 21  BP: (102-161)/(62-83) 126/62  Flow (L/min):  [3-4] 3   Body mass index is 29.23 kg/m².    Physical Exam  Physical Exam  Constitutional:       Appearance: Normal appearance.   HENT:      Right Ear: External ear normal.      Left Ear: External ear normal.      Mouth/Throat:      Mouth: Mucous membranes are moist.      Pharynx: Oropharynx is clear.   Eyes:      Extraocular Movements: Extraocular movements intact.      Conjunctiva/sclera: Conjunctivae normal.      Pupils: Pupils are equal, round, and reactive to light.   Cardiovascular:      Pulses: Normal pulses.      Heart sounds: Normal heart sounds.   Pulmonary:      Breath sounds: Wheezing present.   Abdominal:      Palpations: Abdomen is soft.   Musculoskeletal:         General: Normal range of motion.      Cervical back: Normal range of motion and neck supple.   Skin:     General: Skin is warm and dry.   Neurological:      General: No focal deficit present.      Mental Status: She is alert and oriented to person, place, and time. Mental status is at baseline.         Scheduled Meds   amLODIPine, 10 mg, Oral, Daily  azithromycin, 500 mg, Intravenous, Q24H  budesonide, 0.5 mg, Nebulization, BID - RT  cefTRIAXone, 1,000 mg, Intravenous, Q24H  cetirizine, 10 mg, Oral, Daily  docusate sodium, 100 mg, Oral, BID  enoxaparin, 1 mg/kg,  Subcutaneous, Q12H  ferrous sulfate, 324 mg, Oral, Once per day on Monday Wednesday Friday  ipratropium-albuterol, 3 mL, Nebulization, 4x Daily - RT  levothyroxine, 100 mcg, Oral, Q AM  Lidocaine, 1 patch, Transdermal, Q24H  losartan, 100 mg, Oral, Q24H  mirtazapine, 15 mg, Oral, Nightly  pantoprazole, 40 mg, Oral, Q AM  rOPINIRole, 0.25 mg, Oral, Nightly  rosuvastatin, 10 mg, Oral, Nightly  sertraline, 150 mg, Oral, Nightly  sodium chloride, 10 mL, Intravenous, Q12H  vancomycin, 1,000 mg, Intravenous, Q24H       PRN Meds     acetaminophen **OR** acetaminophen **OR** acetaminophen    senna-docusate sodium **AND** polyethylene glycol **AND** bisacodyl **AND** bisacodyl    Calcium Replacement - Follow Nurse / BPA Driven Protocol    ipratropium-albuterol    Magnesium Standard Dose Replacement - Follow Nurse / BPA Driven Protocol    melatonin    metoclopramide    Pharmacy to dose vancomycin    Phosphorus Replacement - Follow Nurse / BPA Driven Protocol    Potassium Replacement - Follow Nurse / BPA Driven Protocol    sodium chloride    sodium chloride   Infusions  Pharmacy to dose vancomycin,           Diagnostic Data    Results from last 7 days   Lab Units 07/06/24  2323   WBC 10*3/mm3 22.58*   HEMOGLOBIN g/dL 9.9*   HEMATOCRIT % 33.6*   PLATELETS 10*3/mm3 210   GLUCOSE mg/dL 135*   CREATININE mg/dL 1.30*   BUN mg/dL 28*   SODIUM mmol/L 141   POTASSIUM mmol/L 3.9   ANION GAP mmol/L 12.3       CT Angiogram Chest Pulmonary Embolism    Result Date: 7/5/2024  Impression: 1.There are segmental pulmonary emboli involving left upper and left lower lobe pulmonary arterial branches. There is no right heart strain. 2.Bibasilar atelectasis. 3.Hiatal hernia which is probable paraesophageal. 4.Old rib fracture deformities are noted on the right. 5.Evidence for prior granulomatous exposure. 6.Coronary artery calcification. Report called to floor to 2E Electronically Signed: Josemanuel Anton MD  7/5/2024 7:23 PM EDT  Workstation ID:  [id number]       I reviewed the patient's new clinical results.    Assessment/Plan:   Patient is a 84 yr old female w. Pmhx of breast cancer s/p Right mastectomy was on Aromatase inhibitors, no radiation history, currently not on any any chemo or aromatase inhibitors, COPD from second hand smoke exposure, on admission CTA chest found to have Left sided upper and lower lobe PE with no right sided heart strain per CT, echo negative for no mass or thrombus, admitted for further treatment.     Assesments  Acute PE, Hx of malignancy  R/o DVTs , echo neg for mass or thrombus   Hx of malignancy, s/p Right mastectomy   COPD on 2L home o2 AT NIGHT, not daytime    Plan:   -Lovenox 70 mg bid, Follow up US BLE  -Abx for PNA (Duonebs, Pulmicort), supplemental oxygen as needed, baseline 2L at night  -Pulm following, appreciate reccs   -Losartan, amlodipine for HTN   -Synthtroid 100 mcg, PPI 40 mg, Statin, Mirtazapine 15 mg, Iron tabs   -AM labs    VTE Prophylaxis:  Pharmacologic VTE prophylaxis orders are present.         Code status is   Code Status and Medical Interventions:   Ordered at: 07/05/24 1554     Level Of Support Discussed With:    Next of Kin (If No Surrogate)    Patient     Code Status (Patient has no pulse and is not breathing):    CPR (Attempt to Resuscitate)     Medical Interventions (Patient has pulse or is breathing):    Full Support       Plan for disposition:Pending respiratory status improvement possible 1-2 days    Time: 30 minutes    Signature: Electronically signed by Raul Orellana MD, 07/07/24, 07:24 EDT.  [facility] Hospitalist Team

## 2024-07-07 NOTE — SIGNIFICANT NOTE
07/07/24 1354   OTHER   Discipline physical therapist   Rehab Time/Intention   Session Not Performed patient/family declined evaluation  (Pt asleep when PT entered room.  Per family pt still fatigued from OT eval earlier.  Wanted to allow pt to continue to sleep.  PT to f/u 7/8.)   Therapy Assessment/Plan (PT)   Criteria for Skilled Interventions Met (PT) yes;meets criteria   Recommendation   PT - Next Appointment 07/08/24

## 2024-07-08 ENCOUNTER — READMISSION MANAGEMENT (OUTPATIENT)
Dept: CALL CENTER | Facility: HOSPITAL | Age: 84
End: 2024-07-08
Payer: MEDICARE

## 2024-07-08 VITALS
HEART RATE: 75 BPM | TEMPERATURE: 98 F | WEIGHT: 165 LBS | OXYGEN SATURATION: 92 % | HEIGHT: 63 IN | SYSTOLIC BLOOD PRESSURE: 168 MMHG | DIASTOLIC BLOOD PRESSURE: 77 MMHG | RESPIRATION RATE: 24 BRPM | BODY MASS INDEX: 29.23 KG/M2

## 2024-07-08 LAB
BACTERIA SPEC AEROBE CULT: ABNORMAL
GRAM STN SPEC: ABNORMAL
GRAM STN SPEC: ABNORMAL
ISOLATED FROM: ABNORMAL
LAB AP CASE REPORT: NORMAL
PATH REPORT.FINAL DX SPEC: NORMAL

## 2024-07-08 PROCEDURE — 25010000002 ENOXAPARIN PER 10 MG: Performed by: HOSPITALIST

## 2024-07-08 PROCEDURE — 94799 UNLISTED PULMONARY SVC/PX: CPT

## 2024-07-08 PROCEDURE — 94664 DEMO&/EVAL PT USE INHALER: CPT

## 2024-07-08 PROCEDURE — 97530 THERAPEUTIC ACTIVITIES: CPT

## 2024-07-08 PROCEDURE — 97535 SELF CARE MNGMENT TRAINING: CPT

## 2024-07-08 PROCEDURE — 25010000002 ONDANSETRON PER 1 MG: Performed by: HOSPITALIST

## 2024-07-08 PROCEDURE — 94761 N-INVAS EAR/PLS OXIMETRY MLT: CPT

## 2024-07-08 RX ORDER — LANOLIN ALCOHOL/MO/W.PET/CERES
500 CREAM (GRAM) TOPICAL DAILY
Qty: 15 TABLET | Refills: 0 | Status: SHIPPED | OUTPATIENT
Start: 2024-07-08 | End: 2024-08-07

## 2024-07-08 RX ORDER — ONDANSETRON 2 MG/ML
4 INJECTION INTRAMUSCULAR; INTRAVENOUS EVERY 6 HOURS PRN
Status: DISCONTINUED | OUTPATIENT
Start: 2024-07-08 | End: 2024-07-08 | Stop reason: HOSPADM

## 2024-07-08 RX ADMIN — BUDESONIDE 0.5 MG: 0.5 INHALANT RESPIRATORY (INHALATION) at 08:51

## 2024-07-08 RX ADMIN — LEVOTHYROXINE SODIUM 100 MCG: 0.1 TABLET ORAL at 06:02

## 2024-07-08 RX ADMIN — FERROUS SULFATE TAB EC 324 MG (65 MG FE EQUIVALENT) 324 MG: 324 (65 FE) TABLET DELAYED RESPONSE at 08:12

## 2024-07-08 RX ADMIN — PANTOPRAZOLE SODIUM 40 MG: 40 TABLET, DELAYED RELEASE ORAL at 06:02

## 2024-07-08 RX ADMIN — ONDANSETRON 4 MG: 2 INJECTION INTRAMUSCULAR; INTRAVENOUS at 11:13

## 2024-07-08 RX ADMIN — LOSARTAN POTASSIUM 100 MG: 50 TABLET, FILM COATED ORAL at 08:12

## 2024-07-08 RX ADMIN — LIDOCAINE 1 PATCH: 4 PATCH TOPICAL at 08:13

## 2024-07-08 RX ADMIN — IPRATROPIUM BROMIDE AND ALBUTEROL SULFATE 3 ML: .5; 3 SOLUTION RESPIRATORY (INHALATION) at 08:51

## 2024-07-08 RX ADMIN — Medication 10 ML: at 08:12

## 2024-07-08 RX ADMIN — ENOXAPARIN SODIUM 70 MG: 100 INJECTION SUBCUTANEOUS at 11:16

## 2024-07-08 RX ADMIN — AMLODIPINE BESYLATE 10 MG: 5 TABLET ORAL at 08:12

## 2024-07-08 RX ADMIN — IPRATROPIUM BROMIDE AND ALBUTEROL SULFATE 3 ML: .5; 3 SOLUTION RESPIRATORY (INHALATION) at 12:12

## 2024-07-08 RX ADMIN — DOCUSATE SODIUM 100 MG: 100 CAPSULE, LIQUID FILLED ORAL at 08:12

## 2024-07-08 RX ADMIN — CETIRIZINE HYDROCHLORIDE 10 MG: 10 TABLET, FILM COATED ORAL at 08:12

## 2024-07-08 NOTE — OUTREACH NOTE
Prep Survey      Flowsheet Row Responses   [facility] patient discharged from? [facility]   Is LACE score < 7 ? Yes   Eligibility [facility]   Date of Admission 07/05/24   Date of Discharge 07/08/24   Discharge Disposition Home or Self Care   Discharge diagnosis Acute PE   Does the patient have one of the following disease processes/diagnoses(primary or secondary)? Other   Prep survey completed? Yes            Ashlee RODRIGUEZ - Registered Nurse

## 2024-07-08 NOTE — DISCHARGE SUMMARY
[facility] Medicine Services  Discharge Summary    Date of Service: 24  Patient Name: Diane Guan  : 1940  MRN: [id number]    Date of Admission: 2024  Discharge Diagnosis: #Acute on chronic hypoxic respiratory failure  #Acute PE  #Acute left lower extremity DVT  #Positive blood cultures staph suspect contaminant  #History of CLL chronically elevated WBCs  #B12 deficiency.  Date of Discharge:  24  Primary Care Physician: Asia Queen APRN      Presenting Problem:   Shortness of breath [R06.02]    Active and Resolved Hospital Problems:  Active Hospital Problems    Diagnosis POA    **Acute respiratory failure with hypoxia [J96.01] Yes      Resolved Hospital Problems    Diagnosis POA    Shortness of breath [R06.02] Yes         Hospital Course     HPI:  Admitting team HPI   Diane Guan is a 84 y.o. female with a CMH of DM, HTN, HLD, RLS, CKD, arthritis, anxiety, depression, H/O breast cancer who presented to [facility] on 2024 as a transfer from [facility] with shortness of air and increased oxygen demand. Patient has been coughing more than usual lately, with phlegm production, myalgias, and headache. Denies fever or chills. Reports using 2L of oxygen nightly, now requiring 4L during night and day. [facility] asked for transfer due to elevated D. Dimer at 2.5, and CKD for possible PE workup. CTA chest was deferred due to elevated Creatinine.     Hospital Course:  #Acute on chronic hypoxic respiratory failure  #Acute PE  #Acute left lower extremity DVT  Patient started on Lovenox hemoglobin stable will transition to Eliquis 10 mg twice daily for 7 days followed by 5 mg twice daily  TTE EF 62% no evidence of right heart strain  CT chest no evidence of pneumonia discontinue antibiotics  At baseline patient uses 2 L of supplemental oxygen at night only currently  requiring continuous 3 L    #CKD stage IIIb creatinine at baseline  #Positive  blood cultures staph suspect contaminant  #History of CLL chronically elevated WBCs  #B12 deficiency.  Level 398 start on p.o. supplementation  #Hypothyroidism continue home dose of levothyroxine      DISCHARGE Follow Up Recommendations for labs and diagnostics: Outpatient follow-up with pulmonary      Reasons For Change In Medications and Indications for New Medications:      Day of Discharge     Vital Signs:  Temp:  [97.8 °F (36.6 °C)-98.5 °F (36.9 °C)] 98 °F (36.7 °C)  Heart Rate:  [69-87] 71  Resp:  [19-27] 22  BP: (128-168)/(65-82) 168/77  Flow (L/min):  [3] 3    Physical Exam:  Physical Exam AOx3 NAD [place]  RRR S1-S2 audible  Lungs with fair air entry  Abdomen soft nontender nondistended      Pertinent  and/or Most Recent Results     LAB RESULTS:      Lab 07/06/24 2323 07/06/24 0149 07/05/24 2121 07/05/24  0912   WBC 22.58* 29.71*  --  25.23*   HEMOGLOBIN 9.9* 10.4*  --  11.3*   HEMATOCRIT 33.6* 35.6  --  37.9   PLATELETS 210 222  --  230   NEUTROS ABS 4.06 4.75  --  4.00   IMMATURE GRANS (ABS)  --   --   --  0.05   LYMPHS ABS  --   --   --  20.17*   MONOS ABS  --   --   --  0.63   EOS ABS 0.68* 0.30  --  0.28   MCV 92.3 91.3  --  90.0   PROCALCITONIN  --  0.05  --   --    LACTATE  --   --  1.3  --          Lab 07/06/24 2323 07/06/24 0149   SODIUM 141 141   POTASSIUM 3.9 4.0   CHLORIDE 110* 107   CO2 18.7* 18.7*   ANION GAP 12.3 15.3*   BUN 28* 32*   CREATININE 1.30* 1.34*   EGFR 40.9* 39.4*   GLUCOSE 135* 109*   CALCIUM 8.6 9.1   TSH  --  1.330         Lab 07/05/24  0912   LIPASE 6                 Lab 07/06/24 2323 07/06/24 0149   IRON  --  41   IRON SATURATION (TSAT)  --  11*   TIBC  --  383   TRANSFERRIN  --  257   FOLATE 12.30  --    VITAMIN B 12 398  --          Brief Urine Lab Results  (Last result in the past 365 days)        Color   Clarity   Blood   Leuk Est   Nitrite   Protein   CREAT   Urine HCG        10/14/23 1342             109.8         10/14/23 1342 Yellow   Clear   Negative   Small  (1+)   Negative   Negative                 Microbiology Results (last 10 days)       Procedure Component Value - Date/Time    Respiratory Panel PCR w/COVID-19(SARS-CoV-2) LESLEE/VIVIANE/PAVNA/PAD/COR/JUAN DAVID In-House, NP Swab in UTM/VTM, 2 HR TAT - Swab, Nasopharynx [[id number]]  (Normal) Collected: 07/07/24 0953    Lab Status: Final result Specimen: Swab from Nasopharynx Updated: 07/07/24 1119     ADENOVIRUS, PCR Not Detected     Coronavirus 229E Not Detected     Coronavirus HKU1 Not Detected     Coronavirus NL63 Not Detected     Coronavirus OC43 Not Detected     COVID19 Not Detected     Human Metapneumovirus Not Detected     Human Rhinovirus/Enterovirus Not Detected     Influenza A PCR Not Detected     Influenza B PCR Not Detected     Parainfluenza Virus 1 Not Detected     Parainfluenza Virus 2 Not Detected     Parainfluenza Virus 3 Not Detected     Parainfluenza Virus 4 Not Detected     RSV, PCR Not Detected     Bordetella pertussis pcr Not Detected     Bordetella parapertussis PCR Not Detected     Chlamydophila pneumoniae PCR Not Detected     Mycoplasma pneumo by PCR Not Detected    Narrative:      In the setting of a positive respiratory panel with a viral infection PLUS a negative procalcitonin without other underlying concern for bacterial infection, consider observing off antibiotics or discontinuation of antibiotics and continue supportive care. If the respiratory panel is positive for atypical bacterial infection (Bordetella pertussis, Chlamydophila pneumoniae, or Mycoplasma pneumoniae), consider antibiotic de-escalation to target atypical bacterial infection.    Blood Culture - Blood, Arm, Left [[id number]]  (Abnormal) Collected: 07/05/24 2121    Lab Status: Final result Specimen: Blood from Arm, Left Updated: 07/08/24 0934     Blood Culture Staphylococcus, coagulase negative     Isolated from Aerobic and Anaerobic Bottles     Gram Stain Anaerobic Bottle Gram positive cocci in clusters      Aerobic Bottle Gram positive  cocci in clusters    Narrative:      Probable contaminant requires clinical correlation, susceptibility not performed unless requested by physician.    Blood Culture ID, PCR - Blood, Arm, Left [[id number]]  (Abnormal) Collected: 07/05/24 2121    Lab Status: Final result Specimen: Blood from Arm, Left Updated: 07/06/24 1556     BCID, PCR Staph spp, not aureus or lugdunensis. Identification by BCID2 PCR.     BOTTLE TYPE Anaerobic Bottle    Blood Culture - Blood, Arm, Left [[id number]]  (Normal) Collected: 07/05/24 1834    Lab Status: Preliminary result Specimen: Blood from Arm, Left Updated: 07/07/24 1845     Blood Culture No growth at 2 days            CT Angiogram Chest Pulmonary Embolism    Result Date: 7/5/2024  Impression: Impression: 1.There are segmental pulmonary emboli involving left upper and left lower lobe pulmonary arterial branches. There is no right heart strain. 2.Bibasilar atelectasis. 3.Hiatal hernia which is probable paraesophageal. 4.Old rib fracture deformities are noted on the right. 5.Evidence for prior granulomatous exposure. 6.Coronary artery calcification. Report called to floor to 2E Electronically Signed: Josemanuel Anton MD  7/5/2024 7:23 PM EDT  Workstation ID: [id number]     Results for orders placed during the hospital encounter of 07/05/24    Duplex Venous Lower Extremity - Bilateral CAR    Interpretation Summary    Acute left lower extremity deep vein thrombosis noted in the gastrocnemius.    All other veins appeared normal bilaterally.      Results for orders placed during the hospital encounter of 07/05/24    Duplex Venous Lower Extremity - Bilateral CAR    Interpretation Summary    Acute left lower extremity deep vein thrombosis noted in the gastrocnemius.    All other veins appeared normal bilaterally.      Results for orders placed during the hospital encounter of 07/05/24    Adult Transthoracic Echo Complete W/ Cont if Necessary Per Protocol    Interpretation Summary    Left ventricular  systolic function is normal. Calculated left ventricular EF = 62%    Left ventricular wall thickness is consistent with mild concentric hypertrophy.    Left ventricular diastolic function is consistent with (grade Ia w/high LAP) impaired relaxation.    The left atrial cavity is mild to moderately dilated.    There is mild calcification of the aortic valve.    Transthoracic echocardiography reveals mild LVH with EF of 62%.  Diastolic dysfunction criteria noted.  Mild to moderate left atrial enlargement.  Mild AI and no effusion.    Electronically signed by Lucho Rodriguez MD, 07/06/24, 3:40 PM EDT.      Labs Pending at Discharge:  Pending Labs       Order Current Status    Blood Culture - Blood, Arm, Left Preliminary result            Procedures Performed           Consults:   Consults       Date and Time Order Name Status Description    7/5/2024  9:54 PM Inpatient Pulmonology Consult Completed     7/5/2024  4:52 PM Inpatient Pulmonology Consult                Discharge Details        Discharge Medications        Continue These Medications        Instructions Start Date   Accu-Chek Guide w/Device kit   1 kit, Does not apply, Daily, Use to monitor blood sugar once daily      Accu-Chek Softclix Lancets lancets   1 each, Other, Daily, Use to monitor blood sugar once daily      [employer] Alcohol Prep Pads 70 % pads   APPLY 1 EACH TOPICALLY DAILY.      Hearing Aid Batteries misc   1 Units, Does not apply, Every 7 Days             ASK your doctor about these medications        Instructions Start Date   Accu-Chek Guide test strip  Generic drug: glucose blood   1 each, Other, Daily, Use to monitor blood sugar once daily      Alcohol Wipes 70 % misc   1 each, Apply externally, Daily      amLODIPine 10 MG tablet  Commonly known as: NORVASC   10 mg, Oral, Daily      anastrozole 1 MG tablet  Commonly known as: ARIMIDEX   1 mg, Oral, Daily      cetirizine 10 MG tablet  Commonly known as: zyrTEC   1 tablet, Oral, Every Night at  Bedtime      chlorthalidone 25 MG tablet  Commonly known as: HYGROTON   25 mg, Oral, Daily      docusate sodium 100 MG capsule  Commonly known as: COLACE   1 capsule, Oral, 2 Times Daily      ferrous sulfate 324 (65 Fe) MG tablet delayed-release EC tablet   1 tablet, Oral, 3 Times Weekly      ipratropium-albuterol 0.5-2.5 mg/3 ml nebulizer  Commonly known as: DUO-NEB   3 mL, Nebulization, Every 4 Hours PRN      irbesartan 300 MG tablet  Commonly known as: AVAPRO   300 mg, Oral, Daily      levothyroxine 100 MCG tablet  Commonly known as: SYNTHROID, LEVOTHROID   One tab po q day      metFORMIN 500 MG tablet  Commonly known as: GLUCOPHAGE   500 mg, Oral, Daily With Breakfast      metoclopramide 5 MG tablet  Commonly known as: REGLAN   5 mg, Oral, 3 Times Daily PRN      mirtazapine 15 MG tablet  Commonly known as: REMERON   15 mg, Oral, Every Night at Bedtime      O2  Commonly known as: OXYGEN   2 L/min, Inhalation, Continuous      pantoprazole 40 MG EC tablet  Commonly known as: PROTONIX   TAKE 1 TABLET BY MOUTH EVERY MORNING BEFORE BREAKFAST. TAKE ON AN EMPTY STOMACH.      rOPINIRole 0.25 MG tablet  Commonly known as: REQUIP   TAKE 1 TABLET BY MOUTH DAILY AT NIGHT 1 HOUR BEFORE BEDTIME      rosuvastatin 10 MG tablet  Commonly known as: CRESTOR   10 mg, Oral, Nightly      sertraline 100 MG tablet  Commonly known as: ZOLOFT   150 mg, Oral, Daily               No Known Allergies      Discharge Disposition: Home      Diet:  Hospital:  Diet Order   Procedures    Diet: Cardiac; Healthy Heart (2-3 Na+); Fluid Consistency: Thin (IDDSI 0)         Discharge Activity:         CODE STATUS:  Code Status and Medical Interventions:   Ordered at: 07/05/24 5404     Level Of Support Discussed With:    Next of Kin (If No Surrogate)    Patient     Code Status (Patient has no pulse and is not breathing):    CPR (Attempt to Resuscitate)     Medical Interventions (Patient has pulse or is breathing):    Full Support         Future  Appointments   Date Time Provider Department Center   7/15/2024 10:00 AM Asia Queen APRN [facility]   7/19/2024  8:30 AM Asia Queen APRN [facility]           Time spent on Discharge including face to face service:  >30 minutes    Signature: Electronically signed by Josue Osullivan MD, 07/08/24, 11:24 EDT.  [facility] Hospitalist Team

## 2024-07-08 NOTE — PLAN OF CARE
"Assessment: Diane Guan presents with ADL impairments affecting function including balance, cognition, endurance / activity tolerance, motor planning, and strength. Pt continues to present far from baseline and will require 24/7 care at home. Family educated and receptive. Demonstrated functioning below baseline abilities indicate the need for continued skilled intervention while inpatient. Tolerating session today without incident. Will continue to follow and progress as tolerated.     Plan/Recommendations:   Moderate Intensity Therapy recommended post-acute care. This is recommended as therapy feels the patient would require 3-4 days per week and wouldn't tolerate \"3 hour daily\" rehab intensity. SNF would be the preferred choice. If the patient does not agree to SNF, arrange HH or OP depending on home bound status. If patient is medically complex, consider LTACH.. Pt requires no DME at discharge.             " Home

## 2024-07-08 NOTE — PROGRESS NOTES
Daily Progress Note          Assessment    Acute hypoxic respiratory failure   Acute PE  COPD exacerbation  Bibasilar atelectasis  LIBBY  DM II  HTN  CKD  Anemia  Hx breast cancer  Hx CLL  GERD and hiatal hernia  Hx MAC infection   Leukocytosis  Staph coag negative bacteremia 1 out of 2: Likely contamination           PLAN:  Oxygen supplement and titration to maintain saturation 90-95%: Currently requiring 3 L per nasal cannula  Encourage OOB more and use IS more  Anticoagulation  Antibiotics  Bronchodilators  Inhaled corticosteroids  Incentive spirometer    BP/glycemic control  Thyroid hormone replacement  Crestor    I personally reviewed the radiological images               LOS: 3 days     Subjective     Patient reports mild shortness of breath and cough    Objective     Vital signs for last 24 hours:  Vitals:    07/08/24 0851 07/08/24 0854 07/08/24 0855 07/08/24 0858   BP:       BP Location:       Patient Position:       Pulse: 73 73 72 71   Resp: 20 22 19 22   Temp:       TempSrc:       SpO2: 95% 95% 96% 98%   Weight:       Height:           Intake/Output last 3 shifts:  I/O last 3 completed shifts:  In: 1170 [P.O.:720; IV Piggyback:450]  Out: -   Intake/Output this shift:  No intake/output data recorded.      Radiology  Imaging Results (Last 24 Hours)       ** No results found for the last 24 hours. **            Labs:  Results from last 7 days   Lab Units 07/06/24  2323   WBC 10*3/mm3 22.58*   HEMOGLOBIN g/dL 9.9*   HEMATOCRIT % 33.6*   PLATELETS 10*3/mm3 210     Results from last 7 days   Lab Units 07/06/24  2323   SODIUM mmol/L 141   POTASSIUM mmol/L 3.9   CHLORIDE mmol/L 110*   CO2 mmol/L 18.7*   BUN mg/dL 28*   CREATININE mg/dL 1.30*   CALCIUM mg/dL 8.6   GLUCOSE mg/dL 135*                             Results from last 7 days   Lab Units 07/06/24  0149   TSH uIU/mL 1.330           Meds:   SCHEDULE  amLODIPine, 10 mg, Oral, Daily  budesonide, 0.5 mg, Nebulization, BID - RT  cetirizine, 10 mg, Oral,  Daily  docusate sodium, 100 mg, Oral, BID  enoxaparin, 1 mg/kg, Subcutaneous, Q12H  ferrous sulfate, 324 mg, Oral, Once per day on Monday Wednesday Friday  ipratropium-albuterol, 3 mL, Nebulization, 4x Daily - RT  levothyroxine, 100 mcg, Oral, Q AM  Lidocaine, 1 patch, Transdermal, Q24H  losartan, 100 mg, Oral, Q24H  mirtazapine, 15 mg, Oral, Nightly  pantoprazole, 40 mg, Oral, Q AM  rOPINIRole, 0.25 mg, Oral, Nightly  rosuvastatin, 10 mg, Oral, Nightly  sertraline, 150 mg, Oral, Nightly  sodium chloride, 10 mL, Intravenous, Q12H      Infusions     PRNs    acetaminophen **OR** acetaminophen **OR** acetaminophen    benzonatate    senna-docusate sodium **AND** polyethylene glycol **AND** bisacodyl **AND** bisacodyl    Calcium Replacement - Follow Nurse / BPA Driven Protocol    ipratropium-albuterol    Magnesium Standard Dose Replacement - Follow Nurse / BPA Driven Protocol    melatonin    metoclopramide    ondansetron    Phosphorus Replacement - Follow Nurse / BPA Driven Protocol    Potassium Replacement - Follow Nurse / BPA Driven Protocol    sodium chloride    sodium chloride    Physical Exam:  General Appearance:  Alert   HEENT:  Normocephalic, without obvious abnormality, Conjunctiva/corneas clear,.   Nares normal, no drainage     Neck:  Supple, symmetrical, trachea midline.   Lungs /Chest wall:   Bilateral basal rhonchi, respirations unlabored, symmetrical wall movement.     Heart:  Regular rate and rhythm, S1 S2 normal  Abdomen: Soft, non-tender, no masses, no organomegaly.    Extremities: No edema, no clubbing or cyanosis     ROS  Constitutional: Negative for chills, fever and malaise/fatigue.   HENT: Negative.    Eyes: Negative.    Cardiovascular: Negative.    Respiratory: Positive for cough and shortness of breath.    Skin: Negative.    Musculoskeletal: Negative.    Gastrointestinal: Negative.    Genitourinary: Negative.    Neurological: Negative.    Psychiatric/Behavioral: Negative.      I reviewed the  recent clinical results  I personally reviewed the latest radiological studies    Part of this note may be an electronic transcription/translation of spoken language to printed text using the Dragon Dictation System.

## 2024-07-08 NOTE — CASE MANAGEMENT/SOCIAL WORK
Case Management Discharge Note      Final Note: Routine home w/ daughter         Selected Continued Care - Discharged on 7/8/2024 Admission date: 7/5/2024 - Discharge disposition: Home or Self Care         Transportation Services  Private: Car    Final Discharge Disposition Code: 01 - home or self-care

## 2024-07-08 NOTE — CASE MANAGEMENT/SOCIAL WORK
Discharge Planning Assessment   Hoang     Patient Name: Diane Guan  MRN: [id number]  Today's Date: 7/8/2024    Admit Date: 7/5/2024    Plan: DC to daughter Harmony's home.   Discharge Needs Assessment       Row Name 07/08/24 1047       Living Environment    People in Home spouse    Name(s) of People in Home  Jose    Current Living Arrangements home    Potentially Unsafe Housing Conditions none    In the past 12 months has the electric, gas, oil, or water company threatened to shut off services in your home? No    Primary Care Provided by self    Provides Primary Care For no one    Family Caregiver if Needed child(marco), adult    Family Caregiver Names Marta Frank and Harmony    Quality of Family Relationships helpful;involved;supportive    Able to Return to Prior Arrangements yes    Living Arrangement Comments will be discharging to daughter Harmony's home as daughter is able to provide 24/7 care for patient at discharge.       Resource/Environmental Concerns    Resource/Environmental Concerns none    Transportation Concerns none       Transportation Needs    In the past 12 months, has lack of transportation kept you from medical appointments or from getting medications? no    In the past 12 months, has lack of transportation kept you from meetings, work, or from getting things needed for daily living? No       Food Insecurity    Within the past 12 months, you worried that your food would run out before you got the money to buy more. Never true    Within the past 12 months, the food you bought just didn't last and you didn't have money to get more. Never true       Transition Planning    Patient/Family Anticipates Transition to home with family    Patient/Family Anticipated Services at Transition none    Transportation Anticipated car, drives self;family or friend will provide       Discharge Needs Assessment    Readmission Within the Last 30 Days no previous admission in last 30 days     Equipment Currently Used at Home walker, rolling;walker, standard;hospital bed;bath bench;bp cuff;glucometer;oxygen;pulse ox;commode  Home O2 2L NC - [facility]    Concerns to be Addressed denies needs/concerns at this time    Anticipated Changes Related to Illness none    Equipment Needed After Discharge none    Outpatient/Agency/Support Group Needs outpatient therapy  Pulmonary Clinic                   Discharge Plan       Row Name 07/08/24 1050       Plan    Plan DC to massimo Antunez's home.    Plan Comments CM met with patient and daughter Monika at the bedside. Confirmed PCP, insurance, and pharmacy. Patient denies any difficulty affording medications. Patient is not current with any HHC/OPPT/OT services and declines the need for home health. Patient lives at home with her spouse Jose. Patient will be discharging to daughter Harmony's home as daughter can provide help with ADLS PRN. Patient is able to drive, daughters will provide DC transport. DC Barriers: Pulmonology following, IV rocephin, elevated WC.                  Continued Care and Services - Admitted Since 7/5/2024    No active coordination exists for this encounter.       Expected Discharge Date and Time       Expected Discharge Date Expected Discharge Time    Jul 9, 2024            Demographic Summary       Row Name 07/08/24 1046       General Information    Admission Type inpatient    Arrived From emergency department    Required Notices Provided Important Message from Medicare    Referral Source admission list    Reason for Consult discharge planning    Preferred Language English       Contact Information    Permission Granted to Share Info With     Contact Information Obtained for                    Functional Status       Row Name 07/08/24 1047       Functional Status    Usual Activity Tolerance moderate    Current Activity Tolerance moderate       Functional Status, IADL    Medications independent    Meal Preparation  independent    Housekeeping independent    Laundry independent    Shopping independent       Mental Status    General Appearance WDL WDL       Mental Status Summary    Recent Changes in Mental Status/Cognitive Functioning no changes           Nathaniel Benitez RN     Cell number [phone]  Office number [phone]

## 2024-07-08 NOTE — THERAPY TREATMENT NOTE
"Subjective: Pt agreeable to therapeutic plan of care.  Cognition: oriented to Person, Place, and Time    Objective:     Bed Mobility: CGA   Functional Transfers: CGA     Balance: supported and standing Min-A  Functional Ambulation: CGA    Toileting: CGA  ADL Position: supported standing  ADL Comments: laura care    Lower Body Dressing: Max-A  ADL Position: edge of bed sitting  ADL Comments: don socks   Vitals: WNL    Pain: 0 VAS  Location:   Interventions for pain: N/A  Education: Provided education on the importance of mobility in the acute care setting, Verbal/Tactile Cues, ADL training, and Transfer Training      Assessment: Diane Guan presents with ADL impairments affecting function including balance, cognition, endurance / activity tolerance, motor planning, and strength. Pt continues to present far from baseline and will require 24/7 care at home. Family educated and receptive. Demonstrated functioning below baseline abilities indicate the need for continued skilled intervention while inpatient. Tolerating session today without incident. Will continue to follow and progress as tolerated.     Plan/Recommendations:   Moderate Intensity Therapy recommended post-acute care. This is recommended as therapy feels the patient would require 3-4 days per week and wouldn't tolerate \"3 hour daily\" rehab intensity. SNF would be the preferred choice. If the patient does not agree to SNF, arrange HH or OP depending on home bound status. If patient is medically complex, consider LTACH.. Pt requires no DME at discharge.     Pt desires home with 24/7 care and HHOT at discharge. Pt cooperative; agreeable to therapeutic recommendations and plan of care.     Modified [place]: N/A = No pre-op stroke/TIA    Post-Tx Position: Supine with HOB Elevated, Alarms activated, and Call light and personal items within reach  PPE: gloves   "

## 2024-07-08 NOTE — PLAN OF CARE
Goal Outcome Evaluation:           Problem: Adult Inpatient Plan of Care  Goal: Plan of Care Review  Outcome: Ongoing, Progressing  Goal: Patient-Specific Goal (Individualized)  Outcome: Ongoing, Progressing  Goal: Absence of Hospital-Acquired Illness or Injury  Outcome: Ongoing, Progressing  Intervention: Identify and Manage Fall Risk  Recent Flowsheet Documentation  Taken 7/8/2024 0036 by Jane Rendon RN  Safety Promotion/Fall Prevention:   activity supervised   assistive device/personal items within reach   clutter free environment maintained   fall prevention program maintained   nonskid shoes/slippers when out of bed   room organization consistent   safety round/check completed  Taken 7/7/2024 2215 by Jane Rendon RN  Safety Promotion/Fall Prevention:   activity supervised   assistive device/personal items within reach   clutter free environment maintained   fall prevention program maintained   nonskid shoes/slippers when out of bed   room organization consistent   safety round/check completed  Taken 7/7/2024 2045 by Jane Rendon RN  Safety Promotion/Fall Prevention:   assistive device/personal items within reach   activity supervised   clutter free environment maintained   fall prevention program maintained   nonskid shoes/slippers when out of bed   room organization consistent   safety round/check completed  Intervention: Prevent Skin Injury  Recent Flowsheet Documentation  Taken 7/8/2024 0036 by Jane Rendon RN  Body Position: position changed independently  Skin Protection: adhesive use limited  Taken 7/7/2024 2045 by Jane Rendon RN  Body Position: position changed independently  Skin Protection: adhesive use limited  Intervention: Prevent and Manage VTE (Venous Thromboembolism) Risk  Recent Flowsheet Documentation  Taken 7/8/2024 0036 by Jane Rendon RN  Activity Management: activity encouraged  VTE Prevention/Management: (lovenox) other (see comments)  Range of Motion: active ROM  (range of motion) encouraged  Taken 7/7/2024 2045 by Jane Rendon RN  Activity Management: activity encouraged  VTE Prevention/Management: (lovenox) other (see comments)  Range of Motion: active ROM (range of motion) encouraged  Intervention: Prevent Infection  Recent Flowsheet Documentation  Taken 7/8/2024 0036 by Jane Rendon RN  Infection Prevention:   hand hygiene promoted   personal protective equipment utilized   rest/sleep promoted   single patient room provided   environmental surveillance performed  Taken 7/7/2024 2215 by Jane Rendon RN  Infection Prevention:   hand hygiene promoted   rest/sleep promoted   personal protective equipment utilized   single patient room provided   environmental surveillance performed  Taken 7/7/2024 2045 by Jane Rendon RN  Infection Prevention:   hand hygiene promoted   personal protective equipment utilized   rest/sleep promoted   single patient room provided   environmental surveillance performed  Goal: Optimal Comfort and Wellbeing  Outcome: Ongoing, Progressing  Intervention: Provide Person-Centered Care  Recent Flowsheet Documentation  Taken 7/8/2024 0036 by Jane Rendon RN  Trust Relationship/Rapport:   care explained   choices provided  Goal: Readiness for Transition of Care  Outcome: Ongoing, Progressing

## 2024-07-09 ENCOUNTER — TRANSITIONAL CARE MANAGEMENT TELEPHONE ENCOUNTER (OUTPATIENT)
Dept: CALL CENTER | Facility: HOSPITAL | Age: 84
End: 2024-07-09
Payer: MEDICARE

## 2024-07-09 DIAGNOSIS — N18.30 HYPERTENSIVE HEART AND CHRONIC KIDNEY DISEASE STAGE 3: ICD-10-CM

## 2024-07-09 DIAGNOSIS — E78.2 MIXED HYPERLIPIDEMIA: ICD-10-CM

## 2024-07-09 DIAGNOSIS — E03.9 ACQUIRED HYPOTHYROIDISM: ICD-10-CM

## 2024-07-09 DIAGNOSIS — F41.8 MIXED ANXIETY AND DEPRESSIVE DISORDER: ICD-10-CM

## 2024-07-09 DIAGNOSIS — I13.10 HYPERTENSIVE HEART AND CHRONIC KIDNEY DISEASE STAGE 3: ICD-10-CM

## 2024-07-09 NOTE — OUTREACH NOTE
Call Center TCM Note      Flowsheet Row Responses   [facility] patient discharged from? [facility]   Does the patient have one of the following disease processes/diagnoses(primary or secondary)? Other   TCM attempt successful? Yes   Call start time 0825   Call end time 0827   Discharge diagnosis Acute PE   Person spoke with today (if not patient) and relationship Daughter- Monika Pham reviewed with patient/caregiver? Yes   Does the patient have all medications ordered at discharge? Yes   Prescription comments No concerns or questions noted.   Is the patient taking all medications as directed (includes completed medication regime)? Yes   Comments 7/15/2024 10:00 AM  HOSPITAL FOLLOW UP 30 min [facility] [place], [place]   Does the patient have an appointment with their PCP within 7-14 days of discharge? Yes   Has home health visited the patient within 72 hours of discharge? N/A   Psychosocial issues? No   Did the patient receive a copy of their discharge instructions? Yes   Nursing interventions Reviewed instructions with patient   What is the patient's perception of their health status since discharge? Improving   Is the patient/caregiver able to teach back signs and symptoms related to disease process for when to call PCP? Yes   Is the patient/caregiver able to teach back signs and symptoms related to disease process for when to call 911? Yes   Is the patient/caregiver able to teach back the hierarchy of who to call/visit for symptoms/problems? PCP, Specialist, Home health nurse, Urgent Care, ED, 911 Yes   TCM call completed? Yes   Wrap up additional comments Daughter reports patient is doing well. 02 sats 90-92% on 2LNC. No concerns or questions noted.   Call end time 0827   Would this patient benefit from a Referral to Amb Social Work? No   Is the patient interested in additional calls from an ambulatory ? No            Ashlee Regalado, LUCINA    7/9/2024, 08:27  EDT

## 2024-07-10 LAB — BACTERIA SPEC AEROBE CULT: NORMAL

## 2024-07-10 RX ORDER — ROSUVASTATIN CALCIUM 10 MG/1
10 TABLET, COATED ORAL
Qty: 90 TABLET | Refills: 3 | Status: SHIPPED | OUTPATIENT
Start: 2024-07-10

## 2024-07-10 RX ORDER — MIRTAZAPINE 15 MG/1
15 TABLET, FILM COATED ORAL
Qty: 90 TABLET | Refills: 1 | Status: SHIPPED | OUTPATIENT
Start: 2024-07-10

## 2024-07-10 RX ORDER — AMLODIPINE BESYLATE 10 MG/1
10 TABLET ORAL DAILY
Qty: 90 TABLET | Refills: 1 | Status: SHIPPED | OUTPATIENT
Start: 2024-07-10

## 2024-07-10 RX ORDER — LEVOTHYROXINE SODIUM 0.1 MG/1
TABLET ORAL
Qty: 90 TABLET | Refills: 3 | Status: SHIPPED | OUTPATIENT
Start: 2024-07-10

## 2024-07-17 NOTE — PROGRESS NOTES
"Susana Guan is a 84 y.o. female.       HPI   Pt is also here today for 6 month follow up on chronic medical conditions.    1) HTN- currently on amlodipine 10 mg daily; chlorthalidone 25 mg daily; irbesartan 300 mg daily. BP stable.  Denies any Cp; palpitations, SOA, dizziness, headache, trouble with vision. /80's at home.    2) T2DM -  Currently on metformin 500 mg daily.  A1C in Jan 2024 was 6.1%.   Denies any polyuria or polydipsia.  Denies any numbness/tingling in extremities.   3) Hyperlipidemia - currently on rosuvastatin 10 mg daily.   4) Hypothyroidism - currently on levothyroxine 100 mcg daily.   5) COPD- She uses Duo-Nebs as needed.    6) GERD - currently on pantoprazole 40 mg daily; metoclopramide 5 mg tid.  Feels symptoms are stable.    7) Depression - currently on sertraline 150 mg daily; mirtazapine 15 mg at bedtime.  Moods stable.  Denies any SI or HI.   8) RLS - currently on ropinirole 0.25 mg at bedtime.   9) Right knee pain - symptoms started many months ago. No injury.  Pain is dull all the time and knee feels stiff.  With movement knee \"locks up\" and causes her to fall.  She has had several falls due to this in the past few weeks.    10) Has drainage in her throat all the time.  This cause some cough.  Drainage is always clear. No fevers.  Taking cetrizine daily.  Has tried Mucinex otc.      The following portions of the patient's history were reviewed and updated as appropriate: allergies, current medications, past family history, past medical history, past social history, past surgical history, and problem list.    Review of Systems   Constitutional:  Negative for chills, fatigue and fever.   HENT:  Positive for postnasal drip. Negative for congestion, ear discharge, ear pain, rhinorrhea, sinus pressure, sneezing, sore throat and trouble swallowing.    Respiratory:  Positive for cough. Negative for choking, chest tightness, shortness of breath and wheezing.  "   Cardiovascular:  Negative for chest pain and palpitations.   Gastrointestinal:  Negative for abdominal pain, diarrhea, nausea, vomiting and GERD.   Endocrine: Negative for polydipsia, polyphagia and polyuria.   Genitourinary:  Negative for dysuria, frequency, hematuria and urgency.   Musculoskeletal:  Positive for arthralgias.   Neurological:  Negative for dizziness, weakness, light-headedness and headache.   Psychiatric/Behavioral:  Negative for depressed mood. The patient is not nervous/anxious.        Objective   Physical Exam  Vitals reviewed.   Constitutional:       General: She is not in acute distress.     Appearance: Normal appearance.   HENT:      Head: Normocephalic and atraumatic.      Right Ear: External ear normal.      Left Ear: External ear normal.      Nose: Nose normal.      Mouth/Throat:      Mouth: Mucous membranes are moist.      Pharynx: Oropharynx is clear.   Eyes:      Conjunctiva/sclera: Conjunctivae normal.   Cardiovascular:      Rate and Rhythm: Normal rate and regular rhythm.      Pulses: Normal pulses.      Heart sounds: Normal heart sounds. No murmur heard.  Pulmonary:      Effort: Pulmonary effort is normal. No respiratory distress.      Breath sounds: Normal breath sounds. No wheezing.   Chest:      Chest wall: No tenderness.   Abdominal:      Tenderness: There is no right CVA tenderness or left CVA tenderness.   Musculoskeletal:      Cervical back: Normal range of motion and neck supple.      Right knee: No swelling, deformity or erythema. Decreased range of motion. Tenderness present.   Skin:     General: Skin is warm and dry.   Neurological:      General: No focal deficit present.      Mental Status: She is alert and oriented to person, place, and time.   Psychiatric:         Mood and Affect: Mood normal.                  Procedures   Assessment & Plan   Diagnoses and all orders for this visit:    1. 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   Cont. current medication.   Labs ordered.   RTO in 6 mo.  Orders:  -     CBC w AUTO Differential; Future  -     Comprehensive metabolic panel; Future  -     Lipid panel; Future  -     Hemoglobin A1c; Future  -     Microalbumin / Creatinine Urine Ratio - Urine, Clean Catch; Future    2. Type 2 diabetes mellitus with stage 3b chronic kidney disease, without long-term current use of insulin  Comments:  Stable.   Cont. current medication.   Labs ordered.   RTO in 6 mo.  Orders:  -     CBC w AUTO Differential; Future  -     Comprehensive metabolic panel; Future  -     Lipid panel; Future  -     Hemoglobin A1c; Future  -     Microalbumin / Creatinine Urine Ratio - Urine, Clean Catch; Future    3. Mixed hyperlipidemia  Comments:  Stable.   Cont. current medication.   Labs ordered.   RTO in 6 mo.  Orders:  -     CBC w AUTO Differential; Future  -     Comprehensive metabolic panel; Future  -     Lipid panel; Future  -     Hemoglobin A1c; Future  -     Microalbumin / Creatinine Urine Ratio - Urine, Clean Catch; Future    4. Acquired hypothyroidism  Comments:  Stable.   Cont. current medication.   Labs ordered.   RTO in 6 mo.  Orders:  -     CBC w AUTO Differential; Future  -     Comprehensive metabolic panel; Future  -     Lipid panel; Future  -     Hemoglobin A1c; Future  -     Microalbumin / Creatinine Urine Ratio - Urine, Clean Catch; Future  -     TSH; Future    5. Simple chronic bronchitis  Comments:  Stable.   Cont. current medication.   Labs ordered.   RTO in 6 mo.  Orders:  -     CBC w AUTO Differential; Future  -     Comprehensive metabolic panel; Future  -     Lipid panel; Future  -     Hemoglobin A1c; Future  -     Microalbumin / Creatinine Urine Ratio - Urine, Clean Catch; Future    6. Gastroesophageal reflux disease without esophagitis  Comments:  Stable.   Cont. current medication.   Labs ordered.   RTO in 6 mo.  Orders:  -     CBC w AUTO Differential; Future  -     Comprehensive  metabolic panel; Future  -     Lipid panel; Future  -     Hemoglobin A1c; Future  -     Microalbumin / Creatinine Urine Ratio - Urine, Clean Catch; Future    7. Major depressive disorder, recurrent, mild  Comments:  Stable.   Cont. current medication.   Labs ordered.   RTO in 6 mo.  Orders:  -     CBC w AUTO Differential; Future  -     Comprehensive metabolic panel; Future  -     Lipid panel; Future  -     Hemoglobin A1c; Future  -     Microalbumin / Creatinine Urine Ratio - Urine, Clean Catch; Future    8. Restless leg syndrome  Comments:  Stable.   Cont. current medication.   Labs ordered.   RTO in 6 mo.  Orders:  -     CBC w AUTO Differential; Future  -     Comprehensive metabolic panel; Future  -     Lipid panel; Future  -     Hemoglobin A1c; Future  -     Microalbumin / Creatinine Urine Ratio - Urine, Clean Catch; Future    9. Chronic pain of right knee  Comments:  Xray ordered.   Consider referral to Ortho.  Orders:  -     XR Knee 3 View Right; Future    10. Frequent falls  Comments:  Xray of knee ordered.   Consider referral to Ortho.    11. Allergic rhinitis with postnasal drip  Comments:  Given samples of Delysm for cough PRN.   Cont. cetrizine.

## 2024-07-19 ENCOUNTER — OFFICE VISIT (OUTPATIENT)
Dept: FAMILY MEDICINE CLINIC | Facility: CLINIC | Age: 84
End: 2024-07-19
Payer: MEDICARE

## 2024-07-19 DIAGNOSIS — J41.0 SIMPLE CHRONIC BRONCHITIS: ICD-10-CM

## 2024-07-19 DIAGNOSIS — M25.561 CHRONIC PAIN OF RIGHT KNEE: ICD-10-CM

## 2024-07-19 DIAGNOSIS — G25.81 RESTLESS LEG SYNDROME: ICD-10-CM

## 2024-07-19 DIAGNOSIS — K21.9 GASTROESOPHAGEAL REFLUX DISEASE WITHOUT ESOPHAGITIS: ICD-10-CM

## 2024-07-19 DIAGNOSIS — N18.32 TYPE 2 DIABETES MELLITUS WITH STAGE 3B CHRONIC KIDNEY DISEASE, WITHOUT LONG-TERM CURRENT USE OF INSULIN: ICD-10-CM

## 2024-07-19 DIAGNOSIS — E03.9 ACQUIRED HYPOTHYROIDISM: ICD-10-CM

## 2024-07-19 DIAGNOSIS — R09.82 ALLERGIC RHINITIS WITH POSTNASAL DRIP: ICD-10-CM

## 2024-07-19 DIAGNOSIS — G89.29 CHRONIC PAIN OF RIGHT KNEE: ICD-10-CM

## 2024-07-19 DIAGNOSIS — F33.0 MAJOR DEPRESSIVE DISORDER, RECURRENT, MILD: ICD-10-CM

## 2024-07-19 DIAGNOSIS — R29.6 FREQUENT FALLS: ICD-10-CM

## 2024-07-19 DIAGNOSIS — E78.2 MIXED HYPERLIPIDEMIA: ICD-10-CM

## 2024-07-19 DIAGNOSIS — J30.9 ALLERGIC RHINITIS WITH POSTNASAL DRIP: ICD-10-CM

## 2024-07-19 DIAGNOSIS — I13.10 HYPERTENSIVE HEART AND CHRONIC KIDNEY DISEASE STAGE 3: Primary | ICD-10-CM

## 2024-07-19 DIAGNOSIS — E11.22 TYPE 2 DIABETES MELLITUS WITH STAGE 3B CHRONIC KIDNEY DISEASE, WITHOUT LONG-TERM CURRENT USE OF INSULIN: ICD-10-CM

## 2024-07-19 DIAGNOSIS — N18.30 HYPERTENSIVE HEART AND CHRONIC KIDNEY DISEASE STAGE 3: Primary | ICD-10-CM

## 2024-07-19 PROCEDURE — 1160F RVW MEDS BY RX/DR IN RCRD: CPT | Performed by: NURSE PRACTITIONER

## 2024-07-19 PROCEDURE — 1159F MED LIST DOCD IN RCRD: CPT | Performed by: NURSE PRACTITIONER

## 2024-07-19 PROCEDURE — 1126F AMNT PAIN NOTED NONE PRSNT: CPT | Performed by: NURSE PRACTITIONER

## 2024-07-19 PROCEDURE — G2211 COMPLEX E/M VISIT ADD ON: HCPCS | Performed by: NURSE PRACTITIONER

## 2024-07-19 PROCEDURE — 99214 OFFICE O/P EST MOD 30 MIN: CPT | Performed by: NURSE PRACTITIONER

## 2024-07-22 ENCOUNTER — HOSPITAL ENCOUNTER (OUTPATIENT)
Dept: GENERAL RADIOLOGY | Facility: HOSPITAL | Age: 84
Discharge: HOME | End: 2024-07-22
Admitting: NURSE PRACTITIONER
Payer: MEDICARE

## 2024-07-22 ENCOUNTER — LAB (OUTPATIENT)
Dept: FAMILY MEDICINE CLINIC | Facility: CLINIC | Age: 84
End: 2024-07-22
Payer: MEDICARE

## 2024-07-22 VITALS
WEIGHT: 171 LBS | DIASTOLIC BLOOD PRESSURE: 71 MMHG | OXYGEN SATURATION: 92 % | SYSTOLIC BLOOD PRESSURE: 133 MMHG | HEART RATE: 79 BPM | BODY MASS INDEX: 30.29 KG/M2

## 2024-07-22 DIAGNOSIS — K21.9 GASTROESOPHAGEAL REFLUX DISEASE WITHOUT ESOPHAGITIS: ICD-10-CM

## 2024-07-22 DIAGNOSIS — J41.0 SIMPLE CHRONIC BRONCHITIS: ICD-10-CM

## 2024-07-22 DIAGNOSIS — F33.0 MAJOR DEPRESSIVE DISORDER, RECURRENT, MILD: ICD-10-CM

## 2024-07-22 DIAGNOSIS — N18.32 TYPE 2 DIABETES MELLITUS WITH STAGE 3B CHRONIC KIDNEY DISEASE, WITHOUT LONG-TERM CURRENT USE OF INSULIN: ICD-10-CM

## 2024-07-22 DIAGNOSIS — G25.81 RESTLESS LEG SYNDROME: ICD-10-CM

## 2024-07-22 DIAGNOSIS — G89.29 CHRONIC PAIN OF RIGHT KNEE: ICD-10-CM

## 2024-07-22 DIAGNOSIS — E78.2 MIXED HYPERLIPIDEMIA: ICD-10-CM

## 2024-07-22 DIAGNOSIS — E11.22 TYPE 2 DIABETES MELLITUS WITH STAGE 3B CHRONIC KIDNEY DISEASE, WITHOUT LONG-TERM CURRENT USE OF INSULIN: ICD-10-CM

## 2024-07-22 DIAGNOSIS — E03.9 ACQUIRED HYPOTHYROIDISM: ICD-10-CM

## 2024-07-22 DIAGNOSIS — M25.561 CHRONIC PAIN OF RIGHT KNEE: ICD-10-CM

## 2024-07-22 DIAGNOSIS — I13.10 HYPERTENSIVE HEART AND CHRONIC KIDNEY DISEASE STAGE 3: ICD-10-CM

## 2024-07-22 DIAGNOSIS — N18.30 HYPERTENSIVE HEART AND CHRONIC KIDNEY DISEASE STAGE 3: ICD-10-CM

## 2024-07-22 LAB
ALBUMIN SERPL-MCNC: 4.2 G/DL (ref 3.5–5.2)
ALBUMIN/GLOB SERPL: 1.8 G/DL
ALP SERPL-CCNC: 101 U/L (ref 39–117)
ALT SERPL W P-5'-P-CCNC: 11 U/L (ref 1–33)
ANION GAP SERPL CALCULATED.3IONS-SCNC: 10 MMOL/L (ref 5–15)
AST SERPL-CCNC: 14 U/L (ref 1–32)
BILIRUB SERPL-MCNC: <0.2 MG/DL (ref 0–1.2)
BUN SERPL-MCNC: 27 MG/DL (ref 8–23)
BUN/CREAT SERPL: 22.7 (ref 7–25)
CALCIUM SPEC-SCNC: 9.1 MG/DL (ref 8.6–10.5)
CHLORIDE SERPL-SCNC: 108 MMOL/L (ref 98–107)
CHOLEST SERPL-MCNC: 179 MG/DL (ref 0–200)
CO2 SERPL-SCNC: 22 MMOL/L (ref 22–29)
CREAT SERPL-MCNC: 1.19 MG/DL (ref 0.57–1)
DEPRECATED RDW RBC AUTO: 45.9 FL (ref 37–54)
EGFRCR SERPLBLD CKD-EPI 2021: 45.5 ML/MIN/1.73
ERYTHROCYTE [DISTWIDTH] IN BLOOD BY AUTOMATED COUNT: 14.2 % (ref 12.3–15.4)
GLOBULIN UR ELPH-MCNC: 2.4 GM/DL
GLUCOSE SERPL-MCNC: 125 MG/DL (ref 65–99)
HBA1C MFR BLD: 6.4 % (ref 4.8–5.6)
HCT VFR BLD AUTO: 36.7 % (ref 34–46.6)
HDLC SERPL-MCNC: 51 MG/DL (ref 40–60)
HGB BLD-MCNC: 11.2 G/DL (ref 12–15.9)
LDLC SERPL CALC-MCNC: 103 MG/DL (ref 0–100)
LDLC/HDLC SERPL: 1.95 {RATIO}
MCH RBC QN AUTO: 27.3 PG (ref 26.6–33)
MCHC RBC AUTO-ENTMCNC: 30.5 G/DL (ref 31.5–35.7)
MCV RBC AUTO: 89.5 FL (ref 79–97)
PLATELET # BLD AUTO: 189 10*3/MM3 (ref 140–450)
PMV BLD AUTO: 12.1 FL (ref 6–12)
POTASSIUM SERPL-SCNC: 4.5 MMOL/L (ref 3.5–5.2)
PROT SERPL-MCNC: 6.6 G/DL (ref 6–8.5)
RBC # BLD AUTO: 4.1 10*6/MM3 (ref 3.77–5.28)
SODIUM SERPL-SCNC: 140 MMOL/L (ref 136–145)
TRIGL SERPL-MCNC: 143 MG/DL (ref 0–150)
TSH SERPL DL<=0.05 MIU/L-ACNC: 1.32 UIU/ML (ref 0.27–4.2)
VLDLC SERPL-MCNC: 25 MG/DL (ref 5–40)
WBC NRBC COR # BLD AUTO: 25.67 10*3/MM3 (ref 3.4–10.8)

## 2024-07-22 PROCEDURE — 83036 HEMOGLOBIN GLYCOSYLATED A1C: CPT | Performed by: NURSE PRACTITIONER

## 2024-07-22 PROCEDURE — 80061 LIPID PANEL: CPT | Performed by: NURSE PRACTITIONER

## 2024-07-22 PROCEDURE — 85007 BL SMEAR W/DIFF WBC COUNT: CPT | Performed by: NURSE PRACTITIONER

## 2024-07-22 PROCEDURE — 73562 X-RAY EXAM OF KNEE 3: CPT

## 2024-07-22 PROCEDURE — 85025 COMPLETE CBC W/AUTO DIFF WBC: CPT | Performed by: NURSE PRACTITIONER

## 2024-07-22 PROCEDURE — 36415 COLL VENOUS BLD VENIPUNCTURE: CPT

## 2024-07-22 PROCEDURE — 84443 ASSAY THYROID STIM HORMONE: CPT | Performed by: NURSE PRACTITIONER

## 2024-07-22 PROCEDURE — 80053 COMPREHEN METABOLIC PANEL: CPT | Performed by: NURSE PRACTITIONER

## 2024-07-23 LAB
ANISOCYTOSIS BLD QL: ABNORMAL
EOSINOPHIL # BLD MANUAL: 0.77 10*3/MM3 (ref 0–0.4)
EOSINOPHIL NFR BLD MANUAL: 3 % (ref 0.3–6.2)
LYMPHOCYTES # BLD MANUAL: 19.77 10*3/MM3 (ref 0.7–3.1)
LYMPHOCYTES NFR BLD MANUAL: 2 % (ref 5–12)
MONOCYTES # BLD: 0.51 10*3/MM3 (ref 0.1–0.9)
NEUTROPHILS # BLD AUTO: 4.62 10*3/MM3 (ref 1.7–7)
NEUTROPHILS NFR BLD MANUAL: 18 % (ref 42.7–76)
PLAT MORPH BLD: NORMAL
POIKILOCYTOSIS BLD QL SMEAR: ABNORMAL
VARIANT LYMPHS NFR BLD MANUAL: 77 % (ref 19.6–45.3)
WBC MORPH BLD: NORMAL

## 2024-07-24 DIAGNOSIS — M25.561 CHRONIC PAIN OF RIGHT KNEE: Primary | ICD-10-CM

## 2024-07-24 DIAGNOSIS — G89.29 CHRONIC PAIN OF RIGHT KNEE: Primary | ICD-10-CM

## 2024-08-05 ENCOUNTER — TELEPHONE (OUTPATIENT)
Dept: FAMILY MEDICINE CLINIC | Facility: CLINIC | Age: 84
End: 2024-08-05

## 2024-08-05 DIAGNOSIS — I82.409 ACUTE DEEP VEIN THROMBOSIS (DVT) OF LOWER EXTREMITY, UNSPECIFIED LATERALITY, UNSPECIFIED VEIN: Primary | ICD-10-CM

## 2024-08-05 NOTE — TELEPHONE ENCOUNTER
Caller: JESUS SLAUGHTER    Relationship: Emergency Contact    Best call back number: [phone]    What medication are you requesting:   apixaban (ELIQUIS) 5 MG tablet tablet   --ONCE DAILY    Have you had these symptoms before:    [x] Yes  [] No    Have you been treated for these symptoms before:   [x] Yes  [] No    If a prescription is needed, what is your preferred pharmacy and phone number: [id number] [place] [address] [facility] AT [facility] [address] - [phone]  - [phone] FX     Additional notes:  DOWN TO LAST DOSE.     PLEASE CALL TO CONFIRM OR DENY.

## 2024-08-15 ENCOUNTER — OFFICE VISIT (OUTPATIENT)
Dept: ORTHOPEDIC SURGERY | Facility: CLINIC | Age: 84
End: 2024-08-15
Payer: MEDICARE

## 2024-08-15 VITALS — OXYGEN SATURATION: 91 % | HEART RATE: 80 BPM | WEIGHT: 174 LBS | HEIGHT: 63 IN | BODY MASS INDEX: 30.83 KG/M2

## 2024-08-15 DIAGNOSIS — M17.11 PRIMARY OSTEOARTHRITIS OF RIGHT KNEE: Primary | ICD-10-CM

## 2024-08-15 RX ORDER — LIDOCAINE HYDROCHLORIDE 10 MG/ML
4 INJECTION, SOLUTION EPIDURAL; INFILTRATION; INTRACAUDAL; PERINEURAL
Status: COMPLETED | OUTPATIENT
Start: 2024-08-15 | End: 2024-08-15

## 2024-08-15 RX ORDER — TRIAMCINOLONE ACETONIDE 40 MG/ML
80 INJECTION, SUSPENSION INTRA-ARTICULAR; INTRAMUSCULAR
Status: COMPLETED | OUTPATIENT
Start: 2024-08-15 | End: 2024-08-15

## 2024-08-15 RX ADMIN — TRIAMCINOLONE ACETONIDE 80 MG: 40 INJECTION, SUSPENSION INTRA-ARTICULAR; INTRAMUSCULAR at 09:31

## 2024-08-15 RX ADMIN — LIDOCAINE HYDROCHLORIDE 4 ML: 10 INJECTION, SOLUTION EPIDURAL; INFILTRATION; INTRACAUDAL; PERINEURAL at 09:31

## 2024-08-15 NOTE — PROGRESS NOTES
Subjective:     Patient ID: Diane Guan is a 84 y.o. female.    Chief Complaint:    History of Present Illness  Diane Guan presents to clinic today for evaluation of right knee pain and osteoarthritis.  The patient states that has been bothersome for quite some time but lately has gotten quite severe.  She states she is no longer able to ambulate well and she states it catches and locks on her and she intermittently falls when this happens.  She is hopeful to get some resolution of her symptoms but to avoid surgery.  She states majority of her pain is located anterior medially and anteriorly in her knee and is worse with activity and better with rest.     Social History     Occupational History   • Not on file   Tobacco Use   • Smoking status: Former     Current packs/day: 0.00     Average packs/day: 0.5 packs/day for 2.0 years (1.0 ttl pk-yrs)     Types: Cigarettes     Start date: 1990     Quit date: 1992     Years since quittin.6   • Smokeless tobacco: Never   Vaping Use   • Vaping status: Never Used   Substance and Sexual Activity   • Alcohol use: No   • Drug use: No   • Sexual activity: Not Currently     Partners: Male      Past Medical History:   Diagnosis Date   • Acute bronchitis due to human metapneumovirus 2020   • Anxiety disorder    • Arthritis    • Arthritis of back    • Breast cancer 2019    Invasive ductal carcinoma   • Chronic lymphocytic leukemia (CLL), B-cell 2019   • Depression    • Disease of thyroid gland    • Diverticulosis of colon 2018    Identified on colonoscopy   • Dysphagia 2020   • Dyspnea on exertion    • Fall at home 10/11/2023   • Hemorrhoid    • Hiatal hernia 2020   • Hiatal hernia with GERD     large hiatal hernia with high-grade reflux on barium swallow; s/p laparoscopic fundoplication with gastropexy   • History of breast cancer 10/11/2023   • History of cigarette smoking 10/11/2023   • History of diabetes mellitus     no meds now   •  "Hyperlipidemia    • Hypertension    • Knee swelling    • 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    aspiration pneumonia; completed abx therapy   • OAB (overactive bladder)    • Personal history of CLL (chronic lymphocytic leukemia) 10/11/2023   • Skin cancer    • Sleep apnea     daughter states pt does not have and doesn't have machine     Past Surgical History:   Procedure Laterality Date   • BLADDER SURGERY     • BREAST BIOPSY     • BRONCHOSCOPY N/A 09/13/2019    Procedure: BRONCHOSCOPY with bronchial washing;  Surgeon: Marnie Frankel MD;  Location: [facility] ENDOSCOPY;  Service: Pulmonary   • BRONCHOSCOPY Bilateral 10/18/2023    Procedure: BRONCHOSCOPY AT BEDSIDE;  Surgeon: Vinicius Heredia DO;  Location: [facility] ENDOSCOPY;  Service: Pulmonary;  Laterality: Bilateral;   • COLONOSCOPY  07/19/2018    severe diverticulosis   • CYST REMOVAL      Removed a fatty cyst off of her back   • ENDOSCOPY N/A 11/23/2020    Procedure: ESOPHAGOGASTRODUODENOSCOPY with dilatation (Bougie # 48, 50, 52, 54, 56, 58);  Surgeon: Den Plummer DO;  Location: [facility] ENDOSCOPY;  Service: General;  Laterality: N/A;  Post: large hiatal hernia, joshua's ulcers   • HIATAL HERNIA REPAIR N/A 12/30/2020    Procedure: Laparoscopic hiatal hernia repair with gastropexy;  Surgeon: Den Plummer DO;  Location: [facility] MAIN OR;  Service: General;  Laterality: N/A;   • MASTECTOMY Right 02/04/2019    Invasive ductal carcinoma   • TUBAL ABDOMINAL LIGATION         Family History   Problem Relation Age of Onset   • Diabetes Mother    • Arthritis Other    • Heart disease Other                  Objective:  Vitals:    08/15/24 0908   Pulse: 80   SpO2: 91%   Weight: 78.9 kg (174 lb)   Height: 160 cm (63\")         08/15/24  0908   Weight: 78.9 kg (174 lb)     Body mass index is 30.82 kg/m².        Right Knee Exam     Tenderness   The patient is experiencing tenderness in the medial retinaculum and medial joint line.    Range of Motion   Extension:  10 "   Flexion:  110     Tests   Ivette:  Medial - positive Lateral - negative  Varus: negative Valgus: negative  Lachman:  Anterior - negative    Posterior - negative  Drawer:  Anterior - negative    Posterior - negative    Other   Erythema: absent  Scars: absent  Sensation: normal  Pulse: present  Swelling: mild  Effusion: no effusion present             Imaging: 3 views of the right knee were  reviewed by myself in the office today  Indication: right knee pain  Findings: X-rays demonstrate no acute osseous abnormality.  There is no signs of fracture dislocation or subluxation.  There is joint space narrowing, subchondral sclerosis, cystic changes, and periarticular osteophytes most pronounced in the Medial joint.  Comparative studies: None    Large Joint Arthrocentesis: R knee  Date/Time: 8/15/2024 9:31 AM  Consent given by: patient  Site marked: site marked  Timeout: Immediately prior to procedure a time out was called to verify the correct patient, procedure, equipment, support staff and site/side marked as required   Supporting Documentation  Indications: pain   Procedure Details  Location: knee - R knee  Preparation: Patient was prepped and draped in the usual sterile fashion  Needle size: 25 G  Approach: anteromedial  Medications administered: 4 mL lidocaine PF 1% 1 %; 80 mg triamcinolone acetonide 40 MG/ML  Patient tolerance: patient tolerated the procedure well with no immediate complications        Assessment:        1. Primary osteoarthritis of right knee           Plan:          Discussed treatment options at length with patient at today's visit.  I discussed with the patient that she has developed right knee osteoarthritis.I discussed with the patient that the mainstays of conservative treatment for knee oa include physical therapy, nonsteroidal anti-inflammatories, injections, activity modification, and home exercises.  The patient states that they  would like to try intra-articular corticosteroid  injection and prescription for topical compounding cream.  At this point time the patient does not need to schedule follow-up with me today.  I am happy to see the patient back at any point time however if issues arise or they fail to make a full recovery.  Follow up: As needed      Dianeana Guan was in agreement with plan and had all questions answered.     Medications:  New Medications Ordered This Visit   Medications   • Ibuprofen 3 %, Gabapentin 10 %, Baclofen 2 %, lidocaine 4 %, Ketamine HCl 4 %     Sig: Apply 1-2 g topically to the appropriate area as directed 3 (Three) to 4 (Four) times daily.     Dispense:  90 g     Refill:  2     May substitute alternative formula as coverage requires, Ketamine 4%, Ibuprofen 3%, Baclofen 2%, Gabapentin 10%, Lidocaine 4%       Followup:  No follow-ups on file.    Diagnoses and all orders for this visit:    1. Primary osteoarthritis of right knee (Primary)  -     Ibuprofen 3 %, Gabapentin 10 %, Baclofen 2 %, lidocaine 4 %, Ketamine HCl 4 %; Apply 1-2 g topically to the appropriate area as directed 3 (Three) to 4 (Four) times daily.  Dispense: 90 g; Refill: 2          Dictated utilizing Dragon dictation

## 2024-08-29 DIAGNOSIS — N18.30 HYPERTENSIVE HEART AND CHRONIC KIDNEY DISEASE STAGE 3: ICD-10-CM

## 2024-08-29 DIAGNOSIS — K44.9 HIATAL HERNIA WITH GERD: ICD-10-CM

## 2024-08-29 DIAGNOSIS — I13.10 HYPERTENSIVE HEART AND CHRONIC KIDNEY DISEASE STAGE 3: ICD-10-CM

## 2024-08-29 DIAGNOSIS — K21.9 HIATAL HERNIA WITH GERD: ICD-10-CM

## 2024-08-29 RX ORDER — IRBESARTAN 300 MG/1
300 TABLET ORAL DAILY
Qty: 90 TABLET | Refills: 1 | Status: SHIPPED | OUTPATIENT
Start: 2024-08-29

## 2024-08-29 RX ORDER — PANTOPRAZOLE SODIUM 40 MG/1
TABLET, DELAYED RELEASE ORAL
Qty: 90 TABLET | Refills: 1 | Status: SHIPPED | OUTPATIENT
Start: 2024-08-29

## 2024-08-29 RX ORDER — CHLORTHALIDONE 25 MG/1
25 TABLET ORAL DAILY
Qty: 90 TABLET | Refills: 1 | Status: SHIPPED | OUTPATIENT
Start: 2024-08-29

## 2024-11-21 ENCOUNTER — OFFICE VISIT (OUTPATIENT)
Dept: ORTHOPEDIC SURGERY | Facility: CLINIC | Age: 84
End: 2024-11-21
Payer: MEDICARE

## 2024-11-21 VITALS — HEIGHT: 63 IN | OXYGEN SATURATION: 96 % | WEIGHT: 174 LBS | BODY MASS INDEX: 30.83 KG/M2 | HEART RATE: 71 BPM

## 2024-11-21 DIAGNOSIS — M17.11 PRIMARY OSTEOARTHRITIS OF RIGHT KNEE: Primary | ICD-10-CM

## 2024-11-21 RX ORDER — TRIAMCINOLONE ACETONIDE 40 MG/ML
80 INJECTION, SUSPENSION INTRA-ARTICULAR; INTRAMUSCULAR
Status: COMPLETED | OUTPATIENT
Start: 2024-11-21 | End: 2024-11-21

## 2024-11-21 RX ORDER — LIDOCAINE HYDROCHLORIDE 10 MG/ML
4 INJECTION, SOLUTION EPIDURAL; INFILTRATION; INTRACAUDAL; PERINEURAL
Status: COMPLETED | OUTPATIENT
Start: 2024-11-21 | End: 2024-11-21

## 2024-11-21 RX ADMIN — LIDOCAINE HYDROCHLORIDE 4 ML: 10 INJECTION, SOLUTION EPIDURAL; INFILTRATION; INTRACAUDAL; PERINEURAL at 09:31

## 2024-11-21 RX ADMIN — TRIAMCINOLONE ACETONIDE 80 MG: 40 INJECTION, SUSPENSION INTRA-ARTICULAR; INTRAMUSCULAR at 09:31

## 2024-11-21 NOTE — PROGRESS NOTES
Encounter Diagnosis   Name Primary?   • Primary osteoarthritis of right knee Yes     Large Joint Arthrocentesis: R knee  Date/Time: 11/21/2024 9:31 AM  Consent given by: patient  Site marked: site marked  Timeout: Immediately prior to procedure a time out was called to verify the correct patient, procedure, equipment, support staff and site/side marked as required   Supporting Documentation  Indications: pain   Procedure Details  Location: knee - R knee  Preparation: Patient was prepped and draped in the usual sterile fashion  Needle size: 22 G  Approach: anterolateral  Medications administered: 4 mL lidocaine PF 1% 1 %; 80 mg triamcinolone acetonide 40 MG/ML  Patient tolerance: patient tolerated the procedure well with no immediate complications      Patient presents to clinic today for right knee injection(s). I explained details of injections as well as risks, benefits and alternatives with the patient today, had all questions answered, wished to proceed with injection.  I will see patient back as needed for follow up on injections. Patient was instructed to watch for signs or symptoms of infection including redness, swelling, warmth to the touch, or significant increased pain and to contact our office immediately if any of these issues were noted.      Follow up: as needed

## 2024-11-25 DIAGNOSIS — I13.10 HYPERTENSIVE HEART AND CHRONIC KIDNEY DISEASE STAGE 3: ICD-10-CM

## 2024-11-25 DIAGNOSIS — N18.30 HYPERTENSIVE HEART AND CHRONIC KIDNEY DISEASE STAGE 3: ICD-10-CM

## 2024-11-25 DIAGNOSIS — F41.8 MIXED ANXIETY AND DEPRESSIVE DISORDER: ICD-10-CM

## 2024-11-25 RX ORDER — MIRTAZAPINE 15 MG/1
15 TABLET, FILM COATED ORAL
Qty: 90 TABLET | Refills: 1 | Status: SHIPPED | OUTPATIENT
Start: 2024-11-25

## 2024-11-25 RX ORDER — AMLODIPINE BESYLATE 10 MG/1
10 TABLET ORAL DAILY
Qty: 90 TABLET | Refills: 1 | Status: SHIPPED | OUTPATIENT
Start: 2024-11-25

## 2025-02-06 DIAGNOSIS — K21.9 HIATAL HERNIA WITH GERD: ICD-10-CM

## 2025-02-06 DIAGNOSIS — K44.9 HIATAL HERNIA WITH GERD: ICD-10-CM

## 2025-02-06 RX ORDER — METOCLOPRAMIDE 5 MG/1
5 TABLET ORAL 3 TIMES DAILY PRN
Qty: 270 TABLET | Refills: 2 | Status: SHIPPED | OUTPATIENT
Start: 2025-02-06

## 2025-02-21 DIAGNOSIS — N18.32 TYPE 2 DIABETES MELLITUS WITH STAGE 3B CHRONIC KIDNEY DISEASE, WITHOUT LONG-TERM CURRENT USE OF INSULIN: ICD-10-CM

## 2025-02-21 DIAGNOSIS — K21.9 HIATAL HERNIA WITH GERD: ICD-10-CM

## 2025-02-21 DIAGNOSIS — G25.81 RESTLESS LEG SYNDROME: ICD-10-CM

## 2025-02-21 DIAGNOSIS — K44.9 HIATAL HERNIA WITH GERD: ICD-10-CM

## 2025-02-21 DIAGNOSIS — I13.10 HYPERTENSIVE HEART AND CHRONIC KIDNEY DISEASE STAGE 3: ICD-10-CM

## 2025-02-21 DIAGNOSIS — E11.22 TYPE 2 DIABETES MELLITUS WITH STAGE 3B CHRONIC KIDNEY DISEASE, WITHOUT LONG-TERM CURRENT USE OF INSULIN: ICD-10-CM

## 2025-02-21 DIAGNOSIS — N18.30 HYPERTENSIVE HEART AND CHRONIC KIDNEY DISEASE STAGE 3: ICD-10-CM

## 2025-02-21 RX ORDER — CHLORTHALIDONE 25 MG/1
25 TABLET ORAL DAILY
Qty: 90 TABLET | Refills: 1 | Status: SHIPPED | OUTPATIENT
Start: 2025-02-21

## 2025-02-21 RX ORDER — IRBESARTAN 300 MG/1
300 TABLET ORAL DAILY
Qty: 90 TABLET | Refills: 1 | Status: SHIPPED | OUTPATIENT
Start: 2025-02-21

## 2025-02-21 RX ORDER — PANTOPRAZOLE SODIUM 40 MG/1
TABLET, DELAYED RELEASE ORAL
Qty: 90 TABLET | Refills: 1 | Status: SHIPPED | OUTPATIENT
Start: 2025-02-21

## 2025-02-21 RX ORDER — ROPINIROLE 0.25 MG/1
TABLET, FILM COATED ORAL
Qty: 90 TABLET | Refills: 3 | Status: SHIPPED | OUTPATIENT
Start: 2025-02-21

## 2025-03-10 NOTE — PROGRESS NOTES
The ABCs of the Annual Wellness Visit  Subsequent Medicare Wellness Visit    Chief Complaint   Patient presents with    Annual Exam     Medicare Wellness     Dizziness     For a week       Subjective    History of Present Illness:  Diane Guan is a 84 y.o. female who presents for a Subsequent Medicare Wellness Visit.  Unable to complete a MMSE today.   Immunizations reviewed.   Mammogram Dec 2023  DEXA  April 2023    Pt is also here today for 6 month follow up on chronic medical conditions.    1) HTN- currently on amlodipine 10 mg daily; chlorthalidone 25 mg daily; irbesartan 300 mg daily. BP stable.  Denies any Cp; palpitations, SOA, dizziness, headache, trouble with vision. .    2) T2DM -  Currently on metformin 500 mg daily.  A1C in 6.4% in July 2024.  Denies any polyuria or polydipsia.  Denies any numbness/tingling in extremities.   3) Hyperlipidemia - currently on rosuvastatin 10 mg daily.   4) Hypothyroidism - currently on levothyroxine 100 mcg daily.   5) COPD- She uses Duo-Nebs as needed.    6) GERD - currently on pantoprazole 40 mg daily; metoclopramide 5 mg tid.  Feels symptoms are stable.    7) Depression - currently on sertraline 150 mg daily; mirtazapine 15 mg at bedtime.  Moods stable.  Denies any SI or HI.   8) RLS - currently on ropinirole 0.25 mg at bedtime.   9) Dizziness and felt weak over the past couple of weeks. Denies chest pain or palpitations.  Only happens when she is standing to cook food; doesn't happen when she stands to clean dishes or do laundry.  Denies any urinary concerns.  Reports she drinks plenty of water.  Has felt as if she could pass out but has not passed out.  Denies any chest pain, palpitations or SOA.  She feels her breathing has been fine; denies any SOA, cough or wheezes; she is not using her supplemental oxygen.  She has hx of DVT/PE in July 2024 was taking Eliquis but says the prescription ran out and the pharmacy never refilled it; she is not sure the last time  she took this med.       The following portions of the patient's history were reviewed and   updated as appropriate: allergies, current medications, past family history, past medical history, past social history, past surgical history, and problem list.    Compared to one year ago, the patient feels her physical   health is worse.    Compared to one year ago, the patient feels her mental   health is worse.    Recent Hospitalizations:  This patient has had a [facility] operated by [facility] admission record on file within the last 365 days.    Current Medical Providers:  Patient Care Team:  Asia Queen APRN as PCP - General (Nurse Practitioner)  Asia Queen APRN as Nurse Practitioner (Nurse Practitioner)  Ling Bolden MD as Consulting Physician (Hematology and Oncology)    Outpatient Medications Prior to Visit   Medication Sig Dispense Refill    Accu-Chek Softclix Lancets lancets 1 each by Other route Daily. Use to monitor blood sugar once daily 100 each 0    Alcohol Swabs ([employer] Alcohol Prep Pads) 70 % pads APPLY 1 EACH TOPICALLY DAILY.      amLODIPine (NORVASC) 10 MG tablet TAKE 1 TABLET BY MOUTH DAILY FOR HIGH BLOOD PRESSURE 90 tablet 1    Blood Glucose Monitoring Suppl (Accu-Chek Guide) w/Device kit Use 1 kit Daily. Use to monitor blood sugar once daily 1 kit 0    chlorthalidone (HYGROTON) 25 MG tablet TAKE 1 TABLET BY MOUTH DAILY. INDICATIONS: EDEMA 90 tablet 1    docusate sodium (COLACE) 100 MG capsule Take 1 capsule by mouth 2 (Two) Times a Day. Indications: Constipation  3    ferrous sulfate 324 (65 Fe) MG tablet delayed-release EC tablet Take 1 tablet by mouth 3 (Three) Times a Week. Indications: Iron Deficiency      Hearing Aid Batteries misc 1 Units Every 7 (Seven) Days. 52 each 0    irbesartan (AVAPRO) 300 MG tablet TAKE 1 TABLET BY MOUTH DAILY FOR HIGH BLOOD PRESSURE 90 tablet 1    levothyroxine (SYNTHROID, LEVOTHROID) 100 MCG tablet TAKE 1 TABLET BY MOUTH EVERY DAY 90 tablet 3    metFORMIN  (GLUCOPHAGE) 500 MG tablet TAKE 1 TABLET BY MOUTH EVERY DAY WITH BREAKFAST 90 tablet 3    metoclopramide (REGLAN) 5 MG tablet TAKE 1 TABLET BY MOUTH 3 (THREE) TIMES A DAY AS NEEDED (FOR NAUSEA AND VOMITING). 270 tablet 2    mirtazapine (REMERON) 15 MG tablet TAKE 1 TABLET BY MOUTH EVERYDAY AT BEDTIME 90 tablet 1    pantoprazole (PROTONIX) 40 MG EC tablet TAKE 1 TABLET BY MOUTH EVERY MORNING BEFORE BREAKFAST. TAKE ON AN EMPTY STOMACH. 90 tablet 1    rOPINIRole (REQUIP) 0.25 MG tablet TAKE 1 TABLET BY MOUTH DAILY AT NIGHT 1 HOUR BEFORE BEDTIME 90 tablet 3    rosuvastatin (CRESTOR) 10 MG tablet TAKE 1 TABLET BY MOUTH EVERY DAY AT NIGHT 90 tablet 3    sertraline (ZOLOFT) 100 MG tablet TAKE 1 AND 1/2 TABLETS BY MOUTH EVERY  tablet 3    ipratropium-albuterol (DUO-NEB) 0.5-2.5 mg/3 ml nebulizer TAKE 3 ML BY NEBULIZATION EVERY 4 (FOUR) HOURS AS NEEDED FOR WHEEZING OR SHORTNESS OF AIR. 360 mL 1    apixaban (ELIQUIS) 5 MG tablet tablet Take 1 tablet by mouth 2 (Two) Times a Day. (Patient not taking: Reported on 3/11/2025) 60 tablet 6    cetirizine (zyrTEC) 10 MG tablet Take 1 tablet by mouth every night at bedtime. Indications: Upper Respiratory Tract Allergy (Patient not taking: Reported on 3/11/2025)      Ibuprofen 3 %, Gabapentin 10 %, Baclofen 2 %, lidocaine 4 %, Ketamine HCl 4 % Apply 1-2 g topically to the appropriate area as directed 3 (Three) to 4 (Four) times daily. (Patient not taking: Reported on 3/11/2025) 90 g 2    O2 (OXYGEN) Inhale 3 L/min Continuous. Indications: Respiratory Failure (Patient not taking: Reported on 3/11/2025)       No facility-administered medications prior to visit.       No opioid medication identified on active medication list. I have reviewed chart for other potential  high risk medication/s and harmful drug interactions in the elderly.        Aspirin is not on active medication list.  Aspirin use is not indicated based on review of current medical condition/s. Risk of harm  outweighs potential benefits.  .    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    Acquired hypothyroidism    Type 2 diabetes mellitus with stage 3b chronic kidney disease, without long-term current use of insulin    Insomnia    Postmenopausal status    Shoulder pain    Overactive bladder    Vitamin B 12 deficiency    Seasonal allergies    Absolute anemia    Vitamin D deficiency    LIBBY (obstructive sleep apnea)    Nocturnal hypoxia    Acute respiratory failure with hypoxia and hypercapnia    Abdominal pain    COPD (chronic obstructive pulmonary disease)    CKD (chronic kidney disease) stage 3, GFR 30-59 ml/min    Sepsis due to pneumonia    Acute on chronic respiratory failure with hypoxemia    Class 1 obesity due to excess calories with serious comorbidity and body mass index (BMI) of 32.0 to 32.9 in adult    Acute hypoxemic respiratory failure    History of breast cancer    Personal history of CLL (chronic lymphocytic leukemia)    History of cigarette smoking    Fall at home    Rhinovirus infection    Acute respiratory failure with hypoxia    Hyperkalemia    Respiratory failure     Advance Care Planning  Advance Directive is not on file.  ACP discussion was held with the patient during this visit. Patient does not have an advance directive, declines further assistance.    Review of Systems   Constitutional:  Positive for fatigue. Negative for chills and fever.   Eyes:  Negative for visual disturbance.   Respiratory:  Negative for cough, shortness of breath and wheezing.    Cardiovascular:  Negative for chest pain and palpitations.   Gastrointestinal:  Negative for constipation, diarrhea, nausea and  "vomiting.   Endocrine: Negative for cold intolerance, heat intolerance, polydipsia, polyphagia and polyuria.   Genitourinary:  Negative for dysuria, frequency, hematuria and urgency.   Neurological:  Positive for dizziness and weakness. Negative for tremors, seizures, syncope (near syncope), facial asymmetry, speech difficulty, light-headedness and numbness.   Psychiatric/Behavioral:  Negative for dysphoric mood. The patient is not nervous/anxious.         Objective    Vitals:    03/11/25 1256   BP: 128/74   BP Location: Left arm   Patient Position: Sitting   Cuff Size: Adult   Pulse: 77   Resp: 18   Temp: 97.7 °F (36.5 °C)   TempSrc: Temporal   SpO2: 92%   Weight: 78.5 kg (173 lb)   Height: 160 cm (62.99\")     Estimated body mass index is 30.65 kg/m² as calculated from the following:    Height as of this encounter: 160 cm (62.99\").    Weight as of this encounter: 78.5 kg (173 lb).    BMI is >= 30 and <35. (Class 1 Obesity). The following options were offered after discussion;: exercise counseling/recommendations and nutrition counseling/recommendations      Does the patient have evidence of cognitive impairment?  Unable to do MMSE exam today    Physical Exam  Vitals reviewed.   Constitutional:       General: She is not in acute distress.     Appearance: Normal appearance.   HENT:      Head: Normocephalic and atraumatic.   Cardiovascular:      Rate and Rhythm: Normal rate and regular rhythm.      Pulses: Normal pulses.      Heart sounds: Normal heart sounds.   Pulmonary:      Effort: Pulmonary effort is normal. No respiratory distress.      Breath sounds: Wheezing (in bases) present. No rhonchi or rales.   Chest:      Chest wall: No tenderness.   Abdominal:      Tenderness: There is no right CVA tenderness or left CVA tenderness.   Musculoskeletal:      Cervical back: Normal range of motion.   Skin:     General: Skin is warm and dry.   Neurological:      Mental Status: She is alert and oriented to person, place, and " time.      Gait: Gait normal.   Psychiatric:         Mood and Affect: Mood normal.                 HEALTH RISK ASSESSMENT    Smoking Status:  Social History     Tobacco Use   Smoking Status Former    Current packs/day: 0.00    Average packs/day: 0.5 packs/day for 2.0 years (1.0 ttl pk-yrs)    Types: Cigarettes    Start date: 1990    Quit date: 1992    Years since quittin.2   Smokeless Tobacco Never     Alcohol Consumption:  Social History     Substance and Sexual Activity   Alcohol Use No     Fall Risk Screen:    SAMYADI Fall Risk Assessment was completed, and patient is at LOW risk for falls.Assessment completed on:3/11/2025    Depression Screening:      3/11/2025     1:04 PM   PHQ-2/PHQ-9 Depression Screening   Little interest or pleasure in doing things Not at all   Feeling down, depressed, or hopeless Not at all   How difficult have these problems made it for you to do your work, take care of things at home, or get along with other people? Not difficult at all       Health Habits and Functional and Cognitive Screening:      3/4/2025     7:49 AM   Functional & Cognitive Status   Do you have difficulty preparing food and eating? No   Do you have difficulty bathing yourself, getting dressed or grooming yourself? No   Do you have difficulty using the toilet? No   Do you have difficulty moving around from place to place? No   Do you have trouble with steps or getting out of a bed or a chair? No   Current Diet Low Carb Diet   Dental Exam Not up to date   Eye Exam Up to date   Exercise (times per week) 0 times per week   Current Exercises Include No Regular Exercise   Do you need help using the phone?  No   Are you deaf or do you have serious difficulty hearing?  Yes   Do you need help to go to places out of walking distance? No   Do you need help shopping? No   Do you need help preparing meals?  No   Do you need help with housework?  No   Do you need help with laundry? No   Do you need help taking your  medications? No   Do you need help managing money? No   Do you ever drive or ride in a car without wearing a seat belt? No   Have you felt unusual stress, anger or loneliness in the last month? No   Who do you live with? Spouse   If you need help, do you have trouble finding someone available to you? No   Have you been bothered in the last four weeks by sexual problems? No   Do you have difficulty concentrating, remembering or making decisions? No       Age-appropriate Screening Schedule:  Refer to the list below for future screening recommendations based on patient's age, sex and/or medical conditions. Orders for these recommended tests are listed in the plan section. The patient has been provided with a written plan.    Health Maintenance   Topic Date Due    TDAP/TD VACCINES (1 - Tdap) Never done    ZOSTER VACCINE (1 of 2) Never done    RSV Vaccine - Adults (1 - 1-dose 75+ series) Never done    COVID-19 Vaccine (3 - Moderna risk series) 10/28/2022    URINE MICROALBUMIN-CREATININE RATIO (uACR)  08/24/2023    DIABETIC EYE EXAM  03/27/2024    INFLUENZA VACCINE  07/01/2024    BMI FOLLOWUP  01/16/2025    HEMOGLOBIN A1C  01/22/2025    DXA SCAN  04/17/2025    LIPID PANEL  07/22/2025    ANNUAL WELLNESS VISIT  03/11/2026    Pneumococcal Vaccine 50+  Completed    MAMMOGRAM  Discontinued              Assessment & Plan   CMS Preventative Services Quick Reference  Risk Factors Identified During Encounter  Fall Risk-High or Moderate: Discussed Fall Prevention in the home  Immunizations Discussed/Encouraged: Influenza  Inactivity/Sedentary: Patient was advised to exercise at least 150 minutes a week per CDC recommendations. and Patient was advised to do at least 150 minutes a week of moderate intensity activity such as brisk walking and do at least 2 days a week of activities that strengthen muscles such as resistance training and do activities to improve balance such as standing on one foot about 3 days a week.  The above  risks/problems have been discussed with the patient.  Follow up actions/plans if indicated are seen below in the Assessment/Plan Section.  Pertinent information has been shared with the patient in the After Visit Summary.    Diagnoses and all orders for this visit:    1. Medicare annual wellness visit, subsequent (Primary)  Comments:  Medical/social/family hx reviewed.   Immunizaitons reviewed.   Mammogram and DEXA orders given.    2. Essential hypertension  Comments:  Stable.   Cont. current medication.   Labs ordered.   RTO in 6 mo.  Orders:  -     Basic Metabolic Panel; Future  -     Comprehensive Metabolic Panel    3. Type 2 diabetes mellitus with stage 3b chronic kidney disease, without long-term current use of insulin  Comments:  Stable.   Cont. current medication.   Labs ordered.   RTO in 6 mo.  Orders:  -     Microalbumin / Creatinine Urine Ratio - Urine, Clean Catch  -     Basic Metabolic Panel; Future  -     Comprehensive Metabolic Panel  -     Hemoglobin A1c; Future    4. Mixed hyperlipidemia  Comments:  Stable.   Cont. current medication.   Labs ordered.   RTO in 6 mo.  Orders:  -     Lipid Panel    5. Acquired hypothyroidism  Comments:  Stable.   Cont. current medication.   Labs ordered.   RTO in 6 mo.  Orders:  -     Thyroid Panel With TSH    6. Simple chronic bronchitis  Comments:  Pt not using supplemental oxygen.  Cont nebulizer as needed.   Keep f/u with pulmonology as directed.    7. Gastroesophageal reflux disease without esophagitis  Comments:  Stable.   Cont. current medication.   Labs ordered.   RTO in 6 mo.    8. Mixed anxiety and depressive disorder  Comments:  Stable.   Cont. current medication.   Labs ordered.   RTO in 6 mo.    9. Restless leg syndrome  Comments:  Stable.   Cont. current medication.   Labs ordered.   RTO in 6 mo.    10. Postmenopause  Comments:  DEXA scan ordered.  Orders:  -     DEXA Bone Density Axial; Future    11. Dizziness  Comments:  Labs and UA ordered.   Advised to  increase water intake.  Orders:  -     CBC & Differential; Future  -     Urinalysis With Culture If Indicated -; Future    12. Cough, unspecified type  Comments:  CXR today  Orders:  -     XR Chest PA & Lateral; Future    13. Other screening mammogram  Comments:  Order given for screening mammogram  Orders:  -     Mammo Screening Digital Tomosynthesis Bilateral With CAD; Future    14. Pulmonary embolism without acute cor pulmonale, unspecified chronicity, unspecified pulmonary embolism type  Comments:  Resume Eliquis. Refills have been sent in.  Orders:  -     apixaban (ELIQUIS) 5 MG tablet tablet; Take 1 tablet by mouth 2 (Two) Times a Day.  Dispense: 60 tablet; Refill: 6    Other orders  -     Discontinue: apixaban (ELIQUIS) 5 MG tablet tablet; Take 1 tablet by mouth 2 (Two) Times a Day.  Dispense: 60 tablet; Refill: 6        Follow Up:   Return in about 6 months (around 9/11/2025).     An After Visit Summary and PPPS were made available to the patient.

## 2025-03-11 ENCOUNTER — HOSPITAL ENCOUNTER (OUTPATIENT)
Dept: GENERAL RADIOLOGY | Facility: HOSPITAL | Age: 85
Discharge: HOME | End: 2025-03-11
Admitting: NURSE PRACTITIONER
Payer: MEDICARE

## 2025-03-11 ENCOUNTER — HOSPITAL ENCOUNTER (INPATIENT)
Facility: HOSPITAL | Age: 85
LOS: 1 days | Discharge: HOME | DRG: 690 | End: 2025-03-14
Attending: EMERGENCY MEDICINE | Admitting: STUDENT IN AN ORGANIZED HEALTH CARE EDUCATION/TRAINING PROGRAM
Payer: MEDICARE

## 2025-03-11 ENCOUNTER — OFFICE VISIT (OUTPATIENT)
Dept: FAMILY MEDICINE CLINIC | Facility: CLINIC | Age: 85
End: 2025-03-11
Payer: MEDICARE

## 2025-03-11 ENCOUNTER — LAB (OUTPATIENT)
Dept: FAMILY MEDICINE CLINIC | Facility: CLINIC | Age: 85
End: 2025-03-11
Payer: MEDICARE

## 2025-03-11 VITALS
WEIGHT: 173 LBS | DIASTOLIC BLOOD PRESSURE: 74 MMHG | HEART RATE: 77 BPM | TEMPERATURE: 97.7 F | SYSTOLIC BLOOD PRESSURE: 128 MMHG | RESPIRATION RATE: 18 BRPM | HEIGHT: 63 IN | BODY MASS INDEX: 30.65 KG/M2 | OXYGEN SATURATION: 92 %

## 2025-03-11 DIAGNOSIS — N17.9 AKI (ACUTE KIDNEY INJURY): ICD-10-CM

## 2025-03-11 DIAGNOSIS — J41.0 SIMPLE CHRONIC BRONCHITIS: ICD-10-CM

## 2025-03-11 DIAGNOSIS — R05.9 COUGH, UNSPECIFIED TYPE: ICD-10-CM

## 2025-03-11 DIAGNOSIS — Z00.00 MEDICARE ANNUAL WELLNESS VISIT, SUBSEQUENT: Primary | ICD-10-CM

## 2025-03-11 DIAGNOSIS — I10 ESSENTIAL HYPERTENSION: ICD-10-CM

## 2025-03-11 DIAGNOSIS — E03.9 ACQUIRED HYPOTHYROIDISM: ICD-10-CM

## 2025-03-11 DIAGNOSIS — I13.10 HYPERTENSIVE HEART AND CHRONIC KIDNEY DISEASE STAGE 3: ICD-10-CM

## 2025-03-11 DIAGNOSIS — Z12.31 OTHER SCREENING MAMMOGRAM: ICD-10-CM

## 2025-03-11 DIAGNOSIS — I26.99 PULMONARY EMBOLISM WITHOUT ACUTE COR PULMONALE, UNSPECIFIED CHRONICITY, UNSPECIFIED PULMONARY EMBOLISM TYPE: ICD-10-CM

## 2025-03-11 DIAGNOSIS — C91.10 CLL (CHRONIC LYMPHOCYTIC LEUKEMIA): ICD-10-CM

## 2025-03-11 DIAGNOSIS — Z78.0 POSTMENOPAUSE: ICD-10-CM

## 2025-03-11 DIAGNOSIS — E11.22 TYPE 2 DIABETES MELLITUS WITH STAGE 3B CHRONIC KIDNEY DISEASE, WITHOUT LONG-TERM CURRENT USE OF INSULIN: ICD-10-CM

## 2025-03-11 DIAGNOSIS — N18.32 TYPE 2 DIABETES MELLITUS WITH STAGE 3B CHRONIC KIDNEY DISEASE, WITHOUT LONG-TERM CURRENT USE OF INSULIN: ICD-10-CM

## 2025-03-11 DIAGNOSIS — N39.0 ACUTE UTI: Primary | ICD-10-CM

## 2025-03-11 DIAGNOSIS — N18.30 HYPERTENSIVE HEART AND CHRONIC KIDNEY DISEASE STAGE 3: ICD-10-CM

## 2025-03-11 DIAGNOSIS — G25.81 RESTLESS LEG SYNDROME: ICD-10-CM

## 2025-03-11 DIAGNOSIS — R42 DIZZINESS: ICD-10-CM

## 2025-03-11 DIAGNOSIS — K21.9 GASTROESOPHAGEAL REFLUX DISEASE WITHOUT ESOPHAGITIS: ICD-10-CM

## 2025-03-11 DIAGNOSIS — F41.8 MIXED ANXIETY AND DEPRESSIVE DISORDER: ICD-10-CM

## 2025-03-11 DIAGNOSIS — E78.2 MIXED HYPERLIPIDEMIA: ICD-10-CM

## 2025-03-11 LAB
ALBUMIN SERPL-MCNC: 4.1 G/DL (ref 3.5–5.2)
ALBUMIN UR-MCNC: 2.2 MG/DL
ALBUMIN/GLOB SERPL: 1.5 G/DL
ALP SERPL-CCNC: 115 U/L (ref 39–117)
ALT SERPL W P-5'-P-CCNC: 7 U/L (ref 1–33)
ANION GAP SERPL CALCULATED.3IONS-SCNC: 12.4 MMOL/L (ref 5–15)
AST SERPL-CCNC: 13 U/L (ref 1–32)
BACTERIA UR QL AUTO: ABNORMAL /HPF
BILIRUB SERPL-MCNC: 0.3 MG/DL (ref 0–1.2)
BILIRUB UR QL STRIP: NEGATIVE
BUN SERPL-MCNC: 40 MG/DL (ref 8–23)
BUN/CREAT SERPL: 24.4 (ref 7–25)
CALCIUM SPEC-SCNC: 9.2 MG/DL (ref 8.6–10.5)
CHLORIDE SERPL-SCNC: 108 MMOL/L (ref 98–107)
CHOLEST SERPL-MCNC: 190 MG/DL (ref 0–200)
CLARITY UR: CLEAR
CO2 SERPL-SCNC: 20.6 MMOL/L (ref 22–29)
COLOR UR: YELLOW
CREAT SERPL-MCNC: 1.64 MG/DL (ref 0.57–1)
CREAT UR-MCNC: 87.9 MG/DL
DEPRECATED RDW RBC AUTO: 47.2 FL (ref 37–54)
EGFRCR SERPLBLD CKD-EPI 2021: 30.9 ML/MIN/1.73
ERYTHROCYTE [DISTWIDTH] IN BLOOD BY AUTOMATED COUNT: 14.8 % (ref 12.3–15.4)
GLOBULIN UR ELPH-MCNC: 2.8 GM/DL
GLUCOSE SERPL-MCNC: 103 MG/DL (ref 65–99)
GLUCOSE UR STRIP-MCNC: NEGATIVE MG/DL
HBA1C MFR BLD: 6.6 % (ref 4.8–5.6)
HCT VFR BLD AUTO: 38.6 % (ref 34–46.6)
HDLC SERPL-MCNC: 47 MG/DL (ref 40–60)
HGB BLD-MCNC: 12.3 G/DL (ref 12–15.9)
HGB UR QL STRIP.AUTO: NEGATIVE
HOLD SPECIMEN: NORMAL
HYALINE CASTS UR QL AUTO: ABNORMAL /LPF
KETONES UR QL STRIP: NEGATIVE
LDLC SERPL CALC-MCNC: 113 MG/DL (ref 0–100)
LDLC/HDLC SERPL: 2.3 {RATIO}
LEUKOCYTE ESTERASE UR QL STRIP.AUTO: ABNORMAL
LYMPHOCYTES # BLD MANUAL: 33.9 10*3/MM3 (ref 0.7–3.1)
LYMPHOCYTES NFR BLD MANUAL: 1 % (ref 5–12)
MCH RBC QN AUTO: 28.1 PG (ref 26.6–33)
MCHC RBC AUTO-ENTMCNC: 31.9 G/DL (ref 31.5–35.7)
MCV RBC AUTO: 88.1 FL (ref 79–97)
MICROALBUMIN/CREAT UR: 25 MG/G (ref 0–29)
MONOCYTES # BLD: 0.39 10*3/MM3 (ref 0.1–0.9)
NEUTROPHILS # BLD AUTO: 4.68 10*3/MM3 (ref 1.7–7)
NEUTROPHILS NFR BLD MANUAL: 12 % (ref 42.7–76)
NITRITE UR QL STRIP: POSITIVE
PH UR STRIP.AUTO: 5.5 [PH] (ref 5–8)
PLAT MORPH BLD: NORMAL
PLATELET # BLD AUTO: 184 10*3/MM3 (ref 140–450)
PMV BLD AUTO: 12.9 FL (ref 6–12)
POIKILOCYTOSIS BLD QL SMEAR: ABNORMAL
POTASSIUM SERPL-SCNC: 4.5 MMOL/L (ref 3.5–5.2)
PROT SERPL-MCNC: 6.9 G/DL (ref 6–8.5)
PROT UR QL STRIP: NEGATIVE
RBC # BLD AUTO: 4.38 10*6/MM3 (ref 3.77–5.28)
RBC # UR STRIP: ABNORMAL /HPF
REF LAB TEST METHOD: ABNORMAL
SODIUM SERPL-SCNC: 141 MMOL/L (ref 136–145)
SP GR UR STRIP: 1.01 (ref 1–1.03)
SQUAMOUS #/AREA URNS HPF: ABNORMAL /HPF
T-UPTAKE NFR SERPL: 1.1 TBI (ref 0.8–1.3)
T4 SERPL-MCNC: 7.05 MCG/DL (ref 4.5–11.7)
TRIGL SERPL-MCNC: 174 MG/DL (ref 0–150)
TSH SERPL DL<=0.05 MIU/L-ACNC: 2.14 UIU/ML (ref 0.27–4.2)
UROBILINOGEN UR QL STRIP: ABNORMAL
VARIANT LYMPHS NFR BLD MANUAL: 87 % (ref 19.6–45.3)
VLDLC SERPL-MCNC: 30 MG/DL (ref 5–40)
WBC # UR STRIP: ABNORMAL /HPF
WBC MORPH BLD: NORMAL
WBC NRBC COR # BLD AUTO: 38.96 10*3/MM3 (ref 3.4–10.8)

## 2025-03-11 PROCEDURE — 84436 ASSAY OF TOTAL THYROXINE: CPT | Performed by: NURSE PRACTITIONER

## 2025-03-11 PROCEDURE — 80053 COMPREHEN METABOLIC PANEL: CPT | Performed by: NURSE PRACTITIONER

## 2025-03-11 PROCEDURE — 99285 EMERGENCY DEPT VISIT HI MDM: CPT

## 2025-03-11 PROCEDURE — 83036 HEMOGLOBIN GLYCOSYLATED A1C: CPT | Performed by: NURSE PRACTITIONER

## 2025-03-11 PROCEDURE — 82570 ASSAY OF URINE CREATININE: CPT | Performed by: NURSE PRACTITIONER

## 2025-03-11 PROCEDURE — 87186 SC STD MICRODIL/AGAR DIL: CPT

## 2025-03-11 PROCEDURE — 84479 ASSAY OF THYROID (T3 OR T4): CPT | Performed by: NURSE PRACTITIONER

## 2025-03-11 PROCEDURE — 80061 LIPID PANEL: CPT | Performed by: NURSE PRACTITIONER

## 2025-03-11 PROCEDURE — 85007 BL SMEAR W/DIFF WBC COUNT: CPT | Performed by: NURSE PRACTITIONER

## 2025-03-11 PROCEDURE — 81001 URINALYSIS AUTO W/SCOPE: CPT | Performed by: NURSE PRACTITIONER

## 2025-03-11 PROCEDURE — 82043 UR ALBUMIN QUANTITATIVE: CPT | Performed by: NURSE PRACTITIONER

## 2025-03-11 PROCEDURE — 36415 COLL VENOUS BLD VENIPUNCTURE: CPT

## 2025-03-11 PROCEDURE — 87077 CULTURE AEROBIC IDENTIFY: CPT | Performed by: NURSE PRACTITIONER

## 2025-03-11 PROCEDURE — 84443 ASSAY THYROID STIM HORMONE: CPT | Performed by: NURSE PRACTITIONER

## 2025-03-11 PROCEDURE — 36415 COLL VENOUS BLD VENIPUNCTURE: CPT | Performed by: NURSE PRACTITIONER

## 2025-03-11 PROCEDURE — 87086 URINE CULTURE/COLONY COUNT: CPT | Performed by: NURSE PRACTITIONER

## 2025-03-11 PROCEDURE — 71046 X-RAY EXAM CHEST 2 VIEWS: CPT

## 2025-03-11 PROCEDURE — 85025 COMPLETE CBC W/AUTO DIFF WBC: CPT | Performed by: NURSE PRACTITIONER

## 2025-03-12 PROBLEM — D72.829 LEUKOCYTOSIS: Status: ACTIVE | Noted: 2025-03-12

## 2025-03-12 LAB
ALBUMIN SERPL-MCNC: 4 G/DL (ref 3.5–5.2)
ALBUMIN/GLOB SERPL: 1.7 G/DL
ALP SERPL-CCNC: 109 U/L (ref 39–117)
ALT SERPL W P-5'-P-CCNC: 10 U/L (ref 1–33)
ANION GAP SERPL CALCULATED.3IONS-SCNC: 14.5 MMOL/L (ref 5–15)
ANION GAP SERPL CALCULATED.3IONS-SCNC: 15 MMOL/L (ref 5–15)
ANISOCYTOSIS BLD QL: ABNORMAL
ANISOCYTOSIS BLD QL: ABNORMAL
AST SERPL-CCNC: 19 U/L (ref 1–32)
BACTERIA UR QL AUTO: ABNORMAL /HPF
BILIRUB SERPL-MCNC: 0.3 MG/DL (ref 0–1.2)
BILIRUB UR QL STRIP: NEGATIVE
BUN SERPL-MCNC: 44 MG/DL (ref 8–23)
BUN SERPL-MCNC: 44 MG/DL (ref 8–23)
BUN/CREAT SERPL: 23.9 (ref 7–25)
BUN/CREAT SERPL: 26.5 (ref 7–25)
CALCIUM SPEC-SCNC: 8.5 MG/DL (ref 8.6–10.5)
CALCIUM SPEC-SCNC: 8.8 MG/DL (ref 8.6–10.5)
CHLORIDE SERPL-SCNC: 107 MMOL/L (ref 98–107)
CHLORIDE SERPL-SCNC: 110 MMOL/L (ref 98–107)
CLARITY UR: ABNORMAL
CO2 SERPL-SCNC: 14.5 MMOL/L (ref 22–29)
CO2 SERPL-SCNC: 18 MMOL/L (ref 22–29)
COLOR UR: YELLOW
CREAT SERPL-MCNC: 1.66 MG/DL (ref 0.57–1)
CREAT SERPL-MCNC: 1.84 MG/DL (ref 0.57–1)
CRP SERPL-MCNC: <0.3 MG/DL (ref 0–0.5)
D-LACTATE SERPL-SCNC: 1.1 MMOL/L (ref 0.3–2)
DEPRECATED RDW RBC AUTO: 58.4 FL (ref 37–54)
DEPRECATED RDW RBC AUTO: 62 FL (ref 37–54)
EGFRCR SERPLBLD CKD-EPI 2021: 26.9 ML/MIN/1.73
EGFRCR SERPLBLD CKD-EPI 2021: 30.5 ML/MIN/1.73
EOSINOPHIL # BLD MANUAL: 0.6 10*3/MM3 (ref 0–0.4)
EOSINOPHIL NFR BLD MANUAL: 2 % (ref 0.3–6.2)
ERYTHROCYTE [DISTWIDTH] IN BLOOD BY AUTOMATED COUNT: 16.4 % (ref 12.3–15.4)
ERYTHROCYTE [DISTWIDTH] IN BLOOD BY AUTOMATED COUNT: 16.6 % (ref 12.3–15.4)
ERYTHROCYTE [SEDIMENTATION RATE] IN BLOOD: 19 MM/HR (ref 0–30)
FLUAV RNA RESP QL NAA+PROBE: NOT DETECTED
FLUBV RNA RESP QL NAA+PROBE: NOT DETECTED
GEN 5 1HR TROPONIN T REFLEX: 28 NG/L
GLOBULIN UR ELPH-MCNC: 2.4 GM/DL
GLUCOSE BLDC GLUCOMTR-MCNC: 119 MG/DL (ref 70–105)
GLUCOSE BLDC GLUCOMTR-MCNC: 125 MG/DL (ref 70–105)
GLUCOSE BLDC GLUCOMTR-MCNC: 134 MG/DL (ref 70–105)
GLUCOSE BLDC GLUCOMTR-MCNC: 136 MG/DL (ref 70–105)
GLUCOSE SERPL-MCNC: 131 MG/DL (ref 65–99)
GLUCOSE SERPL-MCNC: 163 MG/DL (ref 65–99)
GLUCOSE UR STRIP-MCNC: NEGATIVE MG/DL
HCT VFR BLD AUTO: 41.5 % (ref 34–46.6)
HCT VFR BLD AUTO: 41.8 % (ref 34–46.6)
HGB BLD-MCNC: 11.3 G/DL (ref 12–15.9)
HGB BLD-MCNC: 11.8 G/DL (ref 12–15.9)
HGB UR QL STRIP.AUTO: NEGATIVE
HOLD SPECIMEN: NORMAL
HOLD SPECIMEN: NORMAL
HYALINE CASTS UR QL AUTO: ABNORMAL /LPF
KETONES UR QL STRIP: NEGATIVE
LARGE PLATELETS: ABNORMAL
LEUKOCYTE ESTERASE UR QL STRIP.AUTO: ABNORMAL
LYMPHOCYTES # BLD MANUAL: 25.07 10*3/MM3 (ref 0.7–3.1)
LYMPHOCYTES # BLD MANUAL: 29.65 10*3/MM3 (ref 0.7–3.1)
LYMPHOCYTES NFR BLD MANUAL: 1 % (ref 5–12)
MACROCYTES BLD QL SMEAR: ABNORMAL
MCH RBC QN AUTO: 27.4 PG (ref 26.6–33)
MCH RBC QN AUTO: 27.4 PG (ref 26.6–33)
MCHC RBC AUTO-ENTMCNC: 27 G/DL (ref 31.5–35.7)
MCHC RBC AUTO-ENTMCNC: 28.4 G/DL (ref 31.5–35.7)
MCV RBC AUTO: 101.5 FL (ref 79–97)
MCV RBC AUTO: 96.5 FL (ref 79–97)
MONOCYTES # BLD: 0.34 10*3/MM3 (ref 0.1–0.9)
MUCOUS THREADS URNS QL MICRO: ABNORMAL /HPF
NEUTROPHILS # BLD AUTO: 4.48 10*3/MM3 (ref 1.7–7)
NEUTROPHILS # BLD AUTO: 4.53 10*3/MM3 (ref 1.7–7)
NEUTROPHILS NFR BLD MANUAL: 13 % (ref 42.7–76)
NEUTROPHILS NFR BLD MANUAL: 15 % (ref 42.7–76)
NITRITE UR QL STRIP: POSITIVE
PATHOLOGY REVIEW: YES
PH UR STRIP.AUTO: <=5 [PH] (ref 5–8)
PLAT MORPH BLD: NORMAL
PLATELET # BLD AUTO: 129 10*3/MM3 (ref 140–450)
PLATELET # BLD AUTO: 151 10*3/MM3 (ref 140–450)
PMV BLD AUTO: 12.3 FL (ref 6–12)
PMV BLD AUTO: 12.5 FL (ref 6–12)
POIKILOCYTOSIS BLD QL SMEAR: ABNORMAL
POTASSIUM SERPL-SCNC: 4.2 MMOL/L (ref 3.5–5.2)
POTASSIUM SERPL-SCNC: 4.6 MMOL/L (ref 3.5–5.2)
PROT SERPL-MCNC: 6.4 G/DL (ref 6–8.5)
PROT UR QL STRIP: NEGATIVE
QT INTERVAL: 401 MS
QTC INTERVAL: 417 MS
RBC # BLD AUTO: 4.12 10*6/MM3 (ref 3.77–5.28)
RBC # BLD AUTO: 4.3 10*6/MM3 (ref 3.77–5.28)
RBC # UR STRIP: ABNORMAL /HPF
REF LAB TEST METHOD: ABNORMAL
RSV RNA RESP QL NAA+PROBE: NOT DETECTED
SARS-COV-2 RNA RESP QL NAA+PROBE: NOT DETECTED
SCAN SLIDE: NORMAL
SCAN SLIDE: NORMAL
SMALL PLATELETS BLD QL SMEAR: ADEQUATE
SMUDGE CELLS BLD QL SMEAR: ABNORMAL
SODIUM SERPL-SCNC: 139 MMOL/L (ref 136–145)
SODIUM SERPL-SCNC: 140 MMOL/L (ref 136–145)
SP GR UR STRIP: 1.01 (ref 1–1.03)
SQUAMOUS #/AREA URNS HPF: ABNORMAL /HPF
TROPONIN T % DELTA: -10
TROPONIN T NUMERIC DELTA: -3 NG/L
TROPONIN T SERPL HS-MCNC: 31 NG/L
UROBILINOGEN UR QL STRIP: ABNORMAL
VARIANT LYMPHS NFR BLD MANUAL: 83 % (ref 19.6–45.3)
VARIANT LYMPHS NFR BLD MANUAL: 86 % (ref 19.6–45.3)
WBC # UR STRIP: ABNORMAL /HPF
WBC MORPH BLD: NORMAL
WBC NRBC COR # BLD AUTO: 30.2 10*3/MM3 (ref 3.4–10.8)
WBC NRBC COR # BLD AUTO: 34.48 10*3/MM3 (ref 3.4–10.8)
WHOLE BLOOD HOLD COAG: NORMAL
WHOLE BLOOD HOLD SPECIMEN: NORMAL

## 2025-03-12 PROCEDURE — 83605 ASSAY OF LACTIC ACID: CPT | Performed by: EMERGENCY MEDICINE

## 2025-03-12 PROCEDURE — 36410 VNPNXR 3YR/> PHY/QHP DX/THER: CPT

## 2025-03-12 PROCEDURE — 82948 REAGENT STRIP/BLOOD GLUCOSE: CPT | Performed by: INTERNAL MEDICINE

## 2025-03-12 PROCEDURE — 25810000003 SODIUM CHLORIDE 0.9 % SOLUTION

## 2025-03-12 PROCEDURE — 93005 ELECTROCARDIOGRAM TRACING: CPT | Performed by: EMERGENCY MEDICINE

## 2025-03-12 PROCEDURE — 85652 RBC SED RATE AUTOMATED: CPT | Performed by: EMERGENCY MEDICINE

## 2025-03-12 PROCEDURE — 25810000003 SODIUM CHLORIDE 0.9 % SOLUTION: Performed by: INTERNAL MEDICINE

## 2025-03-12 PROCEDURE — 97162 PT EVAL MOD COMPLEX 30 MIN: CPT

## 2025-03-12 PROCEDURE — 87086 URINE CULTURE/COLONY COUNT: CPT | Performed by: EMERGENCY MEDICINE

## 2025-03-12 PROCEDURE — 82948 REAGENT STRIP/BLOOD GLUCOSE: CPT

## 2025-03-12 PROCEDURE — 87040 BLOOD CULTURE FOR BACTERIA: CPT | Performed by: EMERGENCY MEDICINE

## 2025-03-12 PROCEDURE — 87637 SARSCOV2&INF A&B&RSV AMP PRB: CPT | Performed by: EMERGENCY MEDICINE

## 2025-03-12 PROCEDURE — C1751 CATH, INF, PER/CENT/MIDLINE: HCPCS

## 2025-03-12 PROCEDURE — G0378 HOSPITAL OBSERVATION PER HR: HCPCS

## 2025-03-12 PROCEDURE — 85007 BL SMEAR W/DIFF WBC COUNT: CPT | Performed by: EMERGENCY MEDICINE

## 2025-03-12 PROCEDURE — 99222 1ST HOSP IP/OBS MODERATE 55: CPT | Performed by: INTERNAL MEDICINE

## 2025-03-12 PROCEDURE — 85025 COMPLETE CBC W/AUTO DIFF WBC: CPT

## 2025-03-12 PROCEDURE — 85007 BL SMEAR W/DIFF WBC COUNT: CPT

## 2025-03-12 PROCEDURE — 25810000003 SODIUM CHLORIDE 0.9 % SOLUTION: Performed by: EMERGENCY MEDICINE

## 2025-03-12 PROCEDURE — 25010000002 CEFTRIAXONE PER 250 MG: Performed by: EMERGENCY MEDICINE

## 2025-03-12 PROCEDURE — 86140 C-REACTIVE PROTEIN: CPT | Performed by: EMERGENCY MEDICINE

## 2025-03-12 PROCEDURE — 80053 COMPREHEN METABOLIC PANEL: CPT | Performed by: EMERGENCY MEDICINE

## 2025-03-12 PROCEDURE — 81001 URINALYSIS AUTO W/SCOPE: CPT | Performed by: EMERGENCY MEDICINE

## 2025-03-12 PROCEDURE — 84484 ASSAY OF TROPONIN QUANT: CPT | Performed by: EMERGENCY MEDICINE

## 2025-03-12 PROCEDURE — 85025 COMPLETE CBC W/AUTO DIFF WBC: CPT | Performed by: EMERGENCY MEDICINE

## 2025-03-12 RX ORDER — MIRTAZAPINE 15 MG/1
15 TABLET, FILM COATED ORAL NIGHTLY
Status: DISCONTINUED | OUTPATIENT
Start: 2025-03-12 | End: 2025-03-14 | Stop reason: HOSPADM

## 2025-03-12 RX ORDER — BISACODYL 5 MG/1
5 TABLET, DELAYED RELEASE ORAL DAILY PRN
Status: DISCONTINUED | OUTPATIENT
Start: 2025-03-12 | End: 2025-03-14 | Stop reason: HOSPADM

## 2025-03-12 RX ORDER — LEVOTHYROXINE SODIUM 100 UG/1
100 TABLET ORAL
Status: DISCONTINUED | OUTPATIENT
Start: 2025-03-12 | End: 2025-03-14 | Stop reason: HOSPADM

## 2025-03-12 RX ORDER — AMLODIPINE BESYLATE 5 MG/1
10 TABLET ORAL DAILY
Status: DISCONTINUED | OUTPATIENT
Start: 2025-03-12 | End: 2025-03-14 | Stop reason: HOSPADM

## 2025-03-12 RX ORDER — SODIUM CHLORIDE 9 MG/ML
100 INJECTION, SOLUTION INTRAVENOUS CONTINUOUS
Status: DISPENSED | OUTPATIENT
Start: 2025-03-12 | End: 2025-03-12

## 2025-03-12 RX ORDER — BISACODYL 10 MG
10 SUPPOSITORY, RECTAL RECTAL DAILY PRN
Status: DISCONTINUED | OUTPATIENT
Start: 2025-03-12 | End: 2025-03-14 | Stop reason: HOSPADM

## 2025-03-12 RX ORDER — SODIUM CHLORIDE 0.9 % (FLUSH) 0.9 %
20 SYRINGE (ML) INJECTION AS NEEDED
Status: DISCONTINUED | OUTPATIENT
Start: 2025-03-12 | End: 2025-03-14 | Stop reason: HOSPADM

## 2025-03-12 RX ORDER — INSULIN LISPRO 100 [IU]/ML
2-9 INJECTION, SOLUTION INTRAVENOUS; SUBCUTANEOUS
Status: DISCONTINUED | OUTPATIENT
Start: 2025-03-12 | End: 2025-03-14 | Stop reason: HOSPADM

## 2025-03-12 RX ORDER — SODIUM CHLORIDE 0.9 % (FLUSH) 0.9 %
10 SYRINGE (ML) INJECTION EVERY 12 HOURS SCHEDULED
Status: DISCONTINUED | OUTPATIENT
Start: 2025-03-12 | End: 2025-03-14 | Stop reason: HOSPADM

## 2025-03-12 RX ORDER — SODIUM CHLORIDE 9 MG/ML
40 INJECTION, SOLUTION INTRAVENOUS AS NEEDED
Status: DISCONTINUED | OUTPATIENT
Start: 2025-03-12 | End: 2025-03-14 | Stop reason: HOSPADM

## 2025-03-12 RX ORDER — CHLORTHALIDONE 25 MG/1
25 TABLET ORAL DAILY
Status: DISCONTINUED | OUTPATIENT
Start: 2025-03-12 | End: 2025-03-14 | Stop reason: HOSPADM

## 2025-03-12 RX ORDER — NICOTINE POLACRILEX 4 MG
15 LOZENGE BUCCAL
Status: DISCONTINUED | OUTPATIENT
Start: 2025-03-12 | End: 2025-03-14 | Stop reason: HOSPADM

## 2025-03-12 RX ORDER — ACETAMINOPHEN 325 MG/1
650 TABLET ORAL EVERY 4 HOURS PRN
Status: DISCONTINUED | OUTPATIENT
Start: 2025-03-12 | End: 2025-03-14 | Stop reason: HOSPADM

## 2025-03-12 RX ORDER — SODIUM CHLORIDE 0.9 % (FLUSH) 0.9 %
10 SYRINGE (ML) INJECTION AS NEEDED
Status: DISCONTINUED | OUTPATIENT
Start: 2025-03-12 | End: 2025-03-14 | Stop reason: HOSPADM

## 2025-03-12 RX ORDER — POLYETHYLENE GLYCOL 3350 17 G/17G
17 POWDER, FOR SOLUTION ORAL DAILY PRN
Status: DISCONTINUED | OUTPATIENT
Start: 2025-03-12 | End: 2025-03-14 | Stop reason: HOSPADM

## 2025-03-12 RX ORDER — ACETAMINOPHEN 650 MG/1
650 SUPPOSITORY RECTAL EVERY 4 HOURS PRN
Status: DISCONTINUED | OUTPATIENT
Start: 2025-03-12 | End: 2025-03-14 | Stop reason: HOSPADM

## 2025-03-12 RX ORDER — PANTOPRAZOLE SODIUM 40 MG/1
40 TABLET, DELAYED RELEASE ORAL
Status: DISCONTINUED | OUTPATIENT
Start: 2025-03-12 | End: 2025-03-14 | Stop reason: HOSPADM

## 2025-03-12 RX ORDER — SODIUM CHLORIDE 9 MG/ML
100 INJECTION, SOLUTION INTRAVENOUS CONTINUOUS
Status: DISPENSED | OUTPATIENT
Start: 2025-03-12 | End: 2025-03-13

## 2025-03-12 RX ORDER — DEXTROSE MONOHYDRATE 25 G/50ML
25 INJECTION, SOLUTION INTRAVENOUS
Status: DISCONTINUED | OUTPATIENT
Start: 2025-03-12 | End: 2025-03-14 | Stop reason: HOSPADM

## 2025-03-12 RX ORDER — ROSUVASTATIN CALCIUM 10 MG/1
10 TABLET, COATED ORAL NIGHTLY
Status: DISCONTINUED | OUTPATIENT
Start: 2025-03-12 | End: 2025-03-14 | Stop reason: HOSPADM

## 2025-03-12 RX ORDER — ROPINIROLE 0.25 MG/1
0.25 TABLET, FILM COATED ORAL NIGHTLY
Status: DISCONTINUED | OUTPATIENT
Start: 2025-03-12 | End: 2025-03-14 | Stop reason: HOSPADM

## 2025-03-12 RX ORDER — IBUPROFEN 600 MG/1
1 TABLET ORAL
Status: DISCONTINUED | OUTPATIENT
Start: 2025-03-12 | End: 2025-03-14 | Stop reason: HOSPADM

## 2025-03-12 RX ORDER — AMOXICILLIN 250 MG
2 CAPSULE ORAL 2 TIMES DAILY PRN
Status: DISCONTINUED | OUTPATIENT
Start: 2025-03-12 | End: 2025-03-14 | Stop reason: HOSPADM

## 2025-03-12 RX ORDER — DOCUSATE SODIUM 100 MG/1
100 CAPSULE, LIQUID FILLED ORAL 2 TIMES DAILY
Status: DISCONTINUED | OUTPATIENT
Start: 2025-03-12 | End: 2025-03-14 | Stop reason: HOSPADM

## 2025-03-12 RX ORDER — ACETAMINOPHEN 160 MG/5ML
650 SOLUTION ORAL EVERY 4 HOURS PRN
Status: DISCONTINUED | OUTPATIENT
Start: 2025-03-12 | End: 2025-03-14 | Stop reason: HOSPADM

## 2025-03-12 RX ORDER — ONDANSETRON 2 MG/ML
4 INJECTION INTRAMUSCULAR; INTRAVENOUS EVERY 6 HOURS PRN
Status: DISCONTINUED | OUTPATIENT
Start: 2025-03-12 | End: 2025-03-14 | Stop reason: HOSPADM

## 2025-03-12 RX ADMIN — Medication 10 ML: at 13:59

## 2025-03-12 RX ADMIN — CEFTRIAXONE 2000 MG: 2 INJECTION, POWDER, FOR SOLUTION INTRAMUSCULAR; INTRAVENOUS at 04:23

## 2025-03-12 RX ADMIN — SERTRALINE HYDROCHLORIDE 150 MG: 100 TABLET, FILM COATED ORAL at 13:58

## 2025-03-12 RX ADMIN — AMLODIPINE BESYLATE 10 MG: 5 TABLET ORAL at 13:58

## 2025-03-12 RX ADMIN — ROSUVASTATIN CALCIUM 10 MG: 10 TABLET, FILM COATED ORAL at 21:48

## 2025-03-12 RX ADMIN — SODIUM CHLORIDE 500 ML: 9 INJECTION, SOLUTION INTRAVENOUS at 04:22

## 2025-03-12 RX ADMIN — MIRTAZAPINE 15 MG: 15 TABLET, FILM COATED ORAL at 21:49

## 2025-03-12 RX ADMIN — SODIUM CHLORIDE 100 ML/HR: 9 INJECTION, SOLUTION INTRAVENOUS at 15:51

## 2025-03-12 RX ADMIN — ROPINIROLE HYDROCHLORIDE 0.25 MG: 0.25 TABLET, FILM COATED ORAL at 21:48

## 2025-03-12 RX ADMIN — SODIUM CHLORIDE 100 ML/HR: 9 INJECTION, SOLUTION INTRAVENOUS at 08:47

## 2025-03-12 RX ADMIN — DOCUSATE SODIUM 100 MG: 100 CAPSULE, LIQUID FILLED ORAL at 21:48

## 2025-03-12 RX ADMIN — Medication 10 ML: at 22:01

## 2025-03-12 RX ADMIN — Medication 10 ML: at 14:10

## 2025-03-12 RX ADMIN — Medication 10 ML: at 14:23

## 2025-03-12 RX ADMIN — APIXABAN 5 MG: 5 TABLET, FILM COATED ORAL at 21:49

## 2025-03-12 RX ADMIN — PANTOPRAZOLE SODIUM 40 MG: 40 TABLET, DELAYED RELEASE ORAL at 13:58

## 2025-03-12 RX ADMIN — LEVOTHYROXINE SODIUM 100 MCG: 100 TABLET ORAL at 13:58

## 2025-03-12 RX ADMIN — Medication 10 ML: at 22:17

## 2025-03-12 RX ADMIN — APIXABAN 5 MG: 5 TABLET, FILM COATED ORAL at 08:47

## 2025-03-12 RX ADMIN — DOCUSATE SODIUM 100 MG: 100 CAPSULE, LIQUID FILLED ORAL at 13:58

## 2025-03-12 NOTE — H&P
[facility] Medicine Services  History & Physical    Patient Name: Diane Guan  : 1940  MRN: [id number]  Primary Care Physician:  Asia Queen, SIOMARA  Date of admission: 3/11/2025  Date and Time of Service: 3/11/2025 at 0350      Assessment & Plan      Chief Complaint: fatigue, leukocytosis    Plan:    LEXUS  -Creatinine 1.84 (baseline 1.19), monitor  -IV fluids ordered  -Avoid nephrotoxic medication and contrast  -Avoid hypotension  -Hold home Chlorthalidone, Metformin, Irbesartan    Leukocytosis  UTI  History of CLL  -WBC 34.48, monitor  -UA showed positive nitrites, moderate leukocytes, 21-50 WBC, 4+ bacteria  -Urine culture pending  -Blood cultures pending  -Rocephin given in ED, continue  -Hematology consult per ED provider    History of DVT  -Home Eliquis resumed    Essential Hypertension  -Controlled  -Monitor BP  -Continue home amlodipine      Home medications for chronic conditions will be restarted as appropriate when verified by pharmacy      History of Present Illness     History of Present Illness: Diane Guan is a 84 y.o. female with a previous medical history of CLL, CKD stage 3B, DM, HLD, HTN, Hypothyroidism who presented to [facility] on 3/11/2025 with fatigue and leukocytosis. Her fatigue has been occurring for approximatly 2 weeks and is worse when she stands to cook food. She had an appointment with regular bloodwork with her PCP and her WBC was 38.96 so her PCP instructed her to come to the ED for evaluation due to her history of CLL.    In the ED, WBC is 34.48, creatinine 1.84 (baseline 1.19).  Otherwise, labs were unremarkable.  UA showed positive nitrites, moderate leukocytes, 21-50 WBC, 4+ bacteria.  She is afebrile, all vitals are stable.  Rocephin was given in ED and ED Provider consulted Hematology.  Hospitalist was consulted for further management.    12 point ROS reviewed and negative except as mentioned above    Objective      Vitals:   Temp:  [97.7  °F (36.5 °C)-98 °F (36.7 °C)] 98 °F (36.7 °C)  Heart Rate:  [65-77] 68  Resp:  [16-18] 18  BP: (121-154)/(48-74) 121/48  Body mass index is 30.34 kg/m².    Physical Exam  Vitals and nursing note reviewed.   Constitutional:       Appearance: Normal appearance.   HENT:      Right Ear: Decreased hearing noted.      Left Ear: Decreased hearing noted.      Mouth/Throat:      Mouth: Mucous membranes are moist.   Cardiovascular:      Rate and Rhythm: Normal rate and regular rhythm.   Pulmonary:      Effort: Pulmonary effort is normal.      Breath sounds: Normal breath sounds.   Abdominal:      General: Bowel sounds are normal.      Palpations: Abdomen is soft.   Musculoskeletal:         General: Normal range of motion.   Skin:     General: Skin is warm and dry.   Neurological:      General: No focal deficit present.      Mental Status: She is alert and oriented to person, place, and time. Mental status is at baseline.   Psychiatric:         Mood and Affect: Mood normal.         Behavior: Behavior normal.        Personal History     This is a 84 y.o. female with:    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/2019    Depression     Disease of thyroid gland     Diverticulosis of colon 2018    Identified on colonoscopy    DVT (deep venous thrombosis)     Dysphagia 12/2020    Dyspnea on exertion     Fall at home 10/11/2023    Hemorrhoid     Hiatal hernia 12/2020    Hiatal hernia with GERD     large hiatal hernia with high-grade reflux on barium swallow; s/p laparoscopic fundoplication with gastropexy    History of breast cancer 10/11/2023    History of cigarette smoking 10/11/2023    History of diabetes mellitus     no meds now    Hyperlipidemia     Hypertension     Knee swelling     Mycobacterium 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    aspiration pneumonia; completed abx therapy    OAB (overactive bladder)     Personal history of CLL (chronic lymphocytic leukemia) 10/11/2023    Pulmonary emboli     Skin cancer     Sleep apnea     daughter states pt does not have and doesn't have machine       Past Surgical History:   Procedure Laterality Date    BLADDER SURGERY      BREAST BIOPSY      BRONCHOSCOPY N/A 09/13/2019    Procedure: BRONCHOSCOPY with bronchial washing;  Surgeon: Marnie Frankel MD;  Location: [facility] ENDOSCOPY;  Service: Pulmonary    BRONCHOSCOPY Bilateral 10/18/2023    Procedure: BRONCHOSCOPY AT BEDSIDE;  Surgeon: Vinicius Heredia DO;  Location: [facility] ENDOSCOPY;  Service: Pulmonary;  Laterality: Bilateral;    COLONOSCOPY  07/19/2018    severe diverticulosis    CYST REMOVAL      Removed a fatty cyst off of her back    ENDOSCOPY N/A 11/23/2020    Procedure: ESOPHAGOGASTRODUODENOSCOPY with dilatation (Bougie # 48, 50, 52, 54, 56, 58);  Surgeon: Den Plummer DO;  Location: [facility] ENDOSCOPY;  Service: General;  Laterality: N/A;  Post: large hiatal hernia, joshua's ulcers    HIATAL HERNIA REPAIR N/A 12/30/2020    Procedure: Laparoscopic hiatal hernia repair with gastropexy;  Surgeon: Den Plummer DO;  Location: [facility] MAIN OR;  Service: General;  Laterality: N/A;    MASTECTOMY Right 02/04/2019    Invasive ductal carcinoma    TUBAL ABDOMINAL LIGATION         Active and Resolved Problems  Active Hospital Problems    Diagnosis  POA    Leukocytosis [D72.829]  Yes      Resolved Hospital Problems   No resolved problems to display.       Family History: family history includes Arthritis in an other family member; Diabetes in her mother; Heart disease in an other family member. Otherwise pertinent FHx was reviewed and not pertinent to current issue.    Social History:  reports that she quit smoking about 33 years ago. Her smoking use included cigarettes. She started smoking about 35 years ago. She has a 1 pack-year smoking  history. She has never used smokeless tobacco. She reports that she does not drink alcohol and does not use drugs.    Home Medications:  Prior to Admission Medications       Prescriptions Last Dose Informant Patient Reported? Taking?    Accu-Chek Softclix Lancets lancets   No No    1 each by Other route Daily. Use to monitor blood sugar once daily    Alcohol Swabs ([employer] Alcohol Prep Pads) 70 % pads   Yes No    APPLY 1 EACH TOPICALLY DAILY.    amLODIPine (NORVASC) 10 MG tablet   No No    TAKE 1 TABLET BY MOUTH DAILY FOR HIGH BLOOD PRESSURE    apixaban (ELIQUIS) 5 MG tablet tablet   No No    Take 1 tablet by mouth 2 (Two) Times a Day.    Blood Glucose Monitoring Suppl (Accu-Chek Guide) w/Device kit   No No    Use 1 kit Daily. Use to monitor blood sugar once daily    chlorthalidone (HYGROTON) 25 MG tablet   No No    TAKE 1 TABLET BY MOUTH DAILY. INDICATIONS: EDEMA    docusate sodium (COLACE) 100 MG capsule   Yes No    Take 1 capsule by mouth 2 (Two) Times a Day. Indications: Constipation    ferrous sulfate 324 (65 Fe) MG tablet delayed-release EC tablet   Yes No    Take 1 tablet by mouth 3 (Three) Times a Week. Indications: Iron Deficiency    Hearing Aid Batteries misc   No No    1 Units Every 7 (Seven) Days.    ipratropium-albuterol (DUO-NEB) 0.5-2.5 mg/3 ml nebulizer   No No    TAKE 3 ML BY NEBULIZATION EVERY 4 (FOUR) HOURS AS NEEDED FOR WHEEZING OR SHORTNESS OF AIR.    irbesartan (AVAPRO) 300 MG tablet   No No    TAKE 1 TABLET BY MOUTH DAILY FOR HIGH BLOOD PRESSURE    levothyroxine (SYNTHROID, LEVOTHROID) 100 MCG tablet   No No    TAKE 1 TABLET BY MOUTH EVERY DAY    metFORMIN (GLUCOPHAGE) 500 MG tablet   No No    TAKE 1 TABLET BY MOUTH EVERY DAY WITH BREAKFAST    metoclopramide (REGLAN) 5 MG tablet   No No    TAKE 1 TABLET BY MOUTH 3 (THREE) TIMES A DAY AS NEEDED (FOR NAUSEA AND VOMITING).    mirtazapine (REMERON) 15 MG tablet   No No    TAKE 1 TABLET BY MOUTH EVERYDAY AT BEDTIME    pantoprazole (PROTONIX) 40 MG EC  tablet   No No    TAKE 1 TABLET BY MOUTH EVERY MORNING BEFORE BREAKFAST. TAKE ON AN EMPTY STOMACH.    rOPINIRole (REQUIP) 0.25 MG tablet   No No    TAKE 1 TABLET BY MOUTH DAILY AT NIGHT 1 HOUR BEFORE BEDTIME    rosuvastatin (CRESTOR) 10 MG tablet   No No    TAKE 1 TABLET BY MOUTH EVERY DAY AT NIGHT    sertraline (ZOLOFT) 100 MG tablet   No No    TAKE 1 AND 1/2 TABLETS BY MOUTH EVERY DAY              Allergies:  No Known Allergies        VTE Prophylaxis:  No VTE prophylaxis order currently exists.        CODE STATUS:    Code Status (Patient has no pulse and is not breathing): CPR (Attempt to Resuscitate)  Medical Interventions (Patient has pulse or is breathing): Full Support        Admission Status:  I believe this patient meets observation status.    I discussed the patient's findings and my recommendations with patient, family, and nursing staff.    Signature:     This document has been electronically signed by Cecelia Kenney, MAYITO, APRN, AGACNP-BC on March 12, 2025 03:53 EDT   [facility]

## 2025-03-12 NOTE — PLAN OF CARE
Goal Outcome Evaluation:  Plan of Care Reviewed With: patient, child           Outcome Evaluation: Diane Guan is an 84 y.o. female with medical hx of CLL, CKD, DM, HLD, HTN and hypothyroidism who presents with fatigue and leukocytosis.  She is found with acute UTI and LEXUS.  Pt's youngest daughter is present at time of PT evaluation and provides additional information on home situation. Pt and her spouse (who has Dementia) just moved in with one of her daughters last week for more support at home.  She has no SAMY and only 1 step down to the bathroom.  Pt has a RW, BSC, hospital bed and wheelchair available.  At time of PT evaluation, she is A&O x 4.  She reports no falls in the past 6 months but has fallen in the past year.  Pt is typically independent for mobility with RW.  She performs bed mobility independently. She ambulates several bouts with PT 10', 20' and 30' x 2 with RW, exhibiting downward gaze, decreased gait speed and decreased step length bilaterally. She is receptive to education on gait but needs continued practice. She toilets with supervision and cues to use grab bar for sit to stand.  We will continue to work with her and she may benefit from HH or OP PT at discharge.  She is not agreeable to ongoing services after discharge as she had a large out of pocket expense last time that she recieved HH PT.    Anticipated Discharge Disposition (PT): home with assist, home with outpatient therapy services, home with home health

## 2025-03-12 NOTE — THERAPY EVALUATION
Patient Name: Diane Guan  : 1940    MRN: [id number]                              Today's Date: 3/12/2025       Admit Date: 3/11/2025    Visit Dx:     ICD-10-CM ICD-9-CM   1. Acute UTI  N39.0 599.0   2. LEXUS (acute kidney injury)  N17.9 584.9   3. 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    Acquired hypothyroidism    Type 2 diabetes mellitus with stage 3b chronic kidney disease, without long-term current use of insulin    Insomnia    Postmenopausal status    Shoulder pain    Overactive bladder    Vitamin B 12 deficiency    Seasonal allergies    Absolute anemia    Vitamin D deficiency    LIBBY (obstructive sleep apnea)    Nocturnal hypoxia    Acute respiratory failure with hypoxia and hypercapnia    Abdominal pain    COPD (chronic obstructive pulmonary disease)    CKD (chronic kidney disease) stage 3, GFR 30-59 ml/min    Sepsis due to pneumonia    Acute on chronic respiratory failure with hypoxemia    Class 1 obesity due to excess calories with serious comorbidity and body mass index (BMI) of 32.0 to 32.9 in adult    Acute hypoxemic respiratory failure    History of breast cancer    Personal history of CLL (chronic lymphocytic leukemia)    History of cigarette smoking    Fall at home    Rhinovirus infection    Acute respiratory failure with hypoxia    Hyperkalemia    Respiratory failure    Leukocytosis     Past Medical History:   Diagnosis Date    Acute bronchitis due to human metapneumovirus 2020    Anxiety disorder     Arthritis     Arthritis of back     Breast cancer 2019    Invasive ductal carcinoma    Chronic lymphocytic  leukemia (CLL), B-cell 01/2019    Depression     Disease of thyroid gland     Diverticulosis of colon 2018    Identified on colonoscopy    DVT (deep venous thrombosis)     Dysphagia 12/2020    Dyspnea on exertion     Fall at home 10/11/2023    Hemorrhoid     Hiatal hernia 12/2020    Hiatal hernia with GERD     large hiatal hernia with high-grade reflux on barium swallow; s/p laparoscopic fundoplication with gastropexy    History of breast cancer 10/11/2023    History of cigarette smoking 10/11/2023    History of diabetes mellitus     no meds now    Hyperlipidemia     Hypertension     Knee swelling     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 ENDOSCOPY;  Service: Pulmonary    BRONCHOSCOPY Bilateral 10/18/2023    Procedure: BRONCHOSCOPY AT BEDSIDE;  Surgeon: Vinicius Heredia DO;  Location: [facility] ENDOSCOPY;  Service: Pulmonary;  Laterality: Bilateral;    COLONOSCOPY  07/19/2018    severe diverticulosis    CYST REMOVAL      Removed a fatty cyst off of her back    ENDOSCOPY N/A 11/23/2020    Procedure: ESOPHAGOGASTRODUODENOSCOPY with dilatation (Bougie # 48, 50, 52, 54, 56, 58);  Surgeon: Den Plummer DO;  Location: [facility] ENDOSCOPY;  Service: General;  Laterality: N/A;  Post: large hiatal hernia, joshua's ulcers    HIATAL HERNIA REPAIR N/A 12/30/2020    Procedure: Laparoscopic hiatal hernia repair with gastropexy;  Surgeon: Den Plummer DO;  Location: [facility] MAIN OR;  Service: General;  Laterality: N/A;    MASTECTOMY Right 02/04/2019     Invasive ductal carcinoma    TUBAL ABDOMINAL LIGATION        General Information       Row Name 03/12/25 1507          Physical Therapy Time and Intention    Document Type evaluation  -CL     Mode of Treatment individual therapy;physical therapy  -CL       Row Name 03/12/25 1507          General Information    Patient Profile Reviewed yes  -CL     Prior Level of Function independent:;gait;transfer  -CL     Existing Precautions/Restrictions no known precautions/restrictions  -CL     Barriers to Rehab medically complex  -CL       Row Name 03/12/25 1507          Living Environment    People in Home child(marco), adult;spouse  -CL     Name(s) of People in Home Pt and spouse (who has Dementia) just last week moved in with her daughter for more assistance.  -CL       Row Name 03/12/25 1507          Home Main Entrance    Number of Stairs, Main Entrance none  -CL       Row Name 03/12/25 1507          Stairs Within Home, Primary    Stairs, Within Home, Primary 1 Step down to bathroom  -CL     Number of Stairs, Within Home, Primary one  -CL       Row Name 03/12/25 1507          Cognition    Orientation Status (Cognition) oriented x 4  -CL               User Key  (r) = Recorded By, (t) = Taken By, (c) = Cosigned By      Initials Name Provider Type    CL Sandra Jaffe PT Physical Therapist                   Mobility       Row Name 03/12/25 1509          Bed Mobility    Bed Mobility bed mobility (all) activities  -CL     All Activities, [place] (Bed Mobility) independent  -CL       Row Name 03/12/25 1509          Sit-Stand Transfer    Sit-Stand [place] (Transfers) contact guard  -CL       Row Name 03/12/25 1509          Gait/Stairs (Locomotion)    [place] Level (Gait) contact guard  -CL     Patient was able to Ambulate yes  -CL     Distance in Feet (Gait) 30  -CL     Deviations/Abnormal Patterns (Gait) gait speed decreased;stride length decreased;bilateral deviations  -CL               User Key  (r) = Recorded  By, (t) = Taken By, (c) = Cosigned By      Initials Name Provider Type    Sandra Sanchez PT Physical Therapist                   Obj/Interventions       Row Name 03/12/25 1512          Range of Motion Comprehensive    General Range of Motion no range of motion deficits identified  -CL       Row Name 03/12/25 1512          Strength Comprehensive (MMT)    General Manual Muscle Testing (MMT) Assessment no strength deficits identified  -CL       Row Name 03/12/25 1512          Balance    Balance Assessment sitting static balance;sitting dynamic balance;standing static balance;standing dynamic balance  -CL     Static Sitting Balance independent  -CL     Dynamic Sitting Balance independent  -CL     Position, Sitting Balance unsupported;sitting edge of bed;other (see comments)  Toilet  -CL     Static Standing Balance supervision  -CL     Dynamic Standing Balance supervision  -CL     Position/Device Used, Standing Balance supported  -CL       Row Name 03/12/25 1512          Sensory Assessment (Somatosensory)    Sensory Assessment (Somatosensory) sensation intact  Pt reports neuropathic sensations in feet  -CL               User Key  (r) = Recorded By, (t) = Taken By, (c) = Cosigned By      Initials Name Provider Type    CL Sandra Jaffe PT Physical Therapist                   Goals/Plan       [facility] Name 03/12/25 1522          Transfer Goal 1 (PT)    Activity/Assistive Device (Transfer Goal 1, PT) sit-to-stand/stand-to-sit;bed-to-chair/chair-to-bed  -CL     [place] Level/Cues Needed (Transfer Goal 1, PT) modified independence  -CL     Time Frame (Transfer Goal 1, PT) long term goal (LTG);2 weeks  -CL       Row Name 03/12/25 1522          Gait Training Goal 1 (PT)    Activity/Assistive Device (Gait Training Goal 1, PT) gait (walking locomotion);assistive device use;increase endurance/gait distance;diminish gait deviation  -CL     [place] Level (Gait Training Goal 1, PT) modified independence  -CL      Distance (Gait Training Goal 1, PT) 150'  -CL     Time Frame (Gait Training Goal 1, PT) long term goal (LTG);2 weeks  -CL       Row Name 03/12/25 1522          Stairs Goal 1 (PT)    Activity/Assistive Device (Stairs Goal 1, PT) ascending stairs;descending stairs  -CL     [place] Level/Cues Needed (Stairs Goal 1, PT) modified independence  -CL     Number of Stairs (Stairs Goal 1, PT) 1  -CL     Time Frame (Stairs Goal 1, PT) long term goal (LTG);2 weeks  -CL       Row Name 03/12/25 1522          Therapy Assessment/Plan (PT)    Planned Therapy Interventions (PT) balance training;bed mobility training;gait training;patient/family education;stair training;strengthening;transfer training  -CL               User Key  (r) = Recorded By, (t) = Taken By, (c) = Cosigned By      Initials Name Provider Type    CL Sandra Jaffe, PT Physical Therapist                   Clinical Impression       Row Name 03/12/25 1516          Pain    Pretreatment Pain Rating 0/10 - no pain  -CL     Posttreatment Pain Rating 0/10 - no pain  -CL       Row Name 03/12/25 1516          Plan of Care Review    Plan of Care Reviewed With patient;child  -CL     Outcome Evaluation Diane Guan is an 84 y.o. female with medical hx of CLL, CKD, DM, HLD, HTN and hypothyroidism who presents with fatigue and leukocytosis.  She is found with acute UTI and LEXUS.  Pt's youngest daughter is present at time of PT evaluation and provides additional information on home situation. Pt and her spouse (who has Dementia) just moved in with one of her daughters last week for more support at home.  She has no SAMY and only 1 step down to the bathroom.  Pt has a RW, BSC, hospital bed and wheelchair available.  At time of PT evaluation, she is A&O x 4.  She reports no falls in the past 6 months but has fallen in the past year.  Pt is typically independent for mobility with RW.  She performs bed mobility independently. She ambulates several bouts with PT 10', 20'  and 30' x 2 with RW, exhibiting downward gaze, decreased gait speed and decreased step length bilaterally. She is receptive to education on gait but needs continued practice. She toilets with supervision and cues to use grab bar for sit to stand.  We will continue to work with her and she may benefit from HH or OP PT at discharge.  She is not agreeable to ongoing services after discharge as she had a large out of pocket expense last time that she recieved HH PT.  -CL       Row Name 03/12/25 1516          Therapy Assessment/Plan (PT)    Rehab Potential (PT) good  -CL     Criteria for Skilled Interventions Met (PT) yes;skilled treatment is necessary  -CL     Therapy Frequency (PT) 5 times/wk  -CL     Predicted Duration of Therapy Intervention (PT) Until discharge  -CL       Row Name 03/12/25 1516          Positioning and Restraints    Pre-Treatment Position in bed  -CL     Post Treatment Position bed  -CL     In Bed supine;call light within reach;encouraged to call for assist;exit alarm on;with family/caregiver  -CL               User Key  (r) = Recorded By, (t) = Taken By, (c) = Cosigned By      Initials Name Provider Type    CL Sandra Jaffe, PT Physical Therapist                   Outcome Measures       Row Name 03/12/25 1523 03/12/25 0847       How much help from another person do you currently need...    Turning from your back to your side while in flat bed without using bedrails? 4  -CL 4  -KR    Moving from lying on back to sitting on the side of a flat bed without bedrails? 4  -CL 3  -KR    Moving to and from a bed to a chair (including a wheelchair)? 3  -CL 3  -KR    Standing up from a chair using your arms (e.g., wheelchair, bedside chair)? 3  -CL 3  -KR    Climbing 3-5 steps with a railing? 3  -CL 3  -KR    To walk in hospital room? 3  -CL 2  -KR    AM-PAC 6 Clicks Score (PT) 20  -CL 18  -KR    Highest Level of Mobility Goal 6 --> Walk 10 steps or more  -CL 6 --> Walk 10 steps or more  -KR      [place]  Name 03/12/25 0608          How much help from another person do you currently need...    Turning from your back to your side while in flat bed without using bedrails? 4  -AB     Moving from lying on back to sitting on the side of a flat bed without bedrails? 3  -AB     Moving to and from a bed to a chair (including a wheelchair)? 3  -AB     Standing up from a chair using your arms (e.g., wheelchair, bedside chair)? 3  -AB     Climbing 3-5 steps with a railing? 2  -AB     To walk in hospital room? 2  -AB     AM-PAC 6 Clicks Score (PT) 17  -AB     Highest Level of Mobility Goal 5 --> Static standing  -AB       Row Name 03/12/25 1523          Functional Assessment    Outcome Measure Options AM-PAC 6 Clicks Basic Mobility (PT)  -CL               User Key  (r) = Recorded By, (t) = Taken By, (c) = Cosigned By      Initials Name Provider Type    Rocio Medrano, RN Registered Nurse    CL Sandra Jaffe, PT Physical Therapist    Carine Babcock RN Registered Nurse                                 Physical Therapy Education       Title: PT OT SLP Therapies (Done)       Topic: Physical Therapy (Done)       Point: Mobility training (Done)       Learning Progress Summary            Patient Acceptance, E,TB, VU by  at 3/12/2025 1524                                      User Key       Initials Effective Dates Name Provider Type Discipline     03/02/22 -  Sandra Jaffe, PT Physical Therapist PT                  PT Recommendation and Plan  Planned Therapy Interventions (PT): balance training, bed mobility training, gait training, patient/family education, stair training, strengthening, transfer training  Outcome Evaluation: Diane Guan is an 84 y.o. female with medical hx of CLL, CKD, DM, HLD, HTN and hypothyroidism who presents with fatigue and leukocytosis.  She is found with acute UTI and LEXUS.  Pt's youngest daughter is present at time of PT evaluation and provides additional information on home  situation. Pt and her spouse (who has Dementia) just moved in with one of her daughters last week for more support at home.  She has no SAMY and only 1 step down to the bathroom.  Pt has a RW, BSC, hospital bed and wheelchair available.  At time of PT evaluation, she is A&O x 4.  She reports no falls in the past 6 months but has fallen in the past year.  Pt is typically independent for mobility with RW.  She performs bed mobility independently. She ambulates several bouts with PT 10', 20' and 30' x 2 with RW, exhibiting downward gaze, decreased gait speed and decreased step length bilaterally. She is receptive to education on gait but needs continued practice. She toilets with supervision and cues to use grab bar for sit to stand.  We will continue to work with her and she may benefit from HH or OP PT at discharge.  She is not agreeable to ongoing services after discharge as she had a large out of pocket expense last time that she recieved HH PT.     Time Calculation:   PT Evaluation Complexity  History, PT Evaluation Complexity: 3 or more personal factors and/or comorbidities  Examination of Body Systems (PT Eval Complexity): total of 3 or more elements  Clinical Presentation (PT Evaluation Complexity): evolving  Clinical Decision Making (PT Evaluation Complexity): moderate complexity  Overall Complexity (PT Evaluation Complexity): moderate complexity     PT Charges       Row Name 03/12/25 1525             Time Calculation    Start Time 1403  -CL      Stop Time 1426  -CL      Time Calculation (min) 23 min  -CL      PT Received On 03/12/25  -CL      PT - Next Appointment 03/13/25  -CL      PT Goal Re-Cert Due Date 03/26/25  -CL         Time Calculation- PT    Total Timed Code Minutes- PT 0 minute(s)  -CL                User Key  (r) = Recorded By, (t) = Taken By, (c) = Cosigned By      Initials Name Provider Type    Sandra Sanchez, PT Physical Therapist                  Therapy Charges for Today       Code  Description Service Date Service Provider Modifiers Qty    01336969175 HC PT EVAL MOD COMPLEXITY 4 3/12/2025 Sandra Jaffe, PT GP 1            PT G-Codes  Outcome Measure Options: AM-PAC 6 Clicks Basic Mobility (PT)  AM-PAC 6 Clicks Score (PT): 20  PT Discharge Summary  Anticipated Discharge Disposition (PT): home with assist, home with outpatient therapy services, home with home health    Sandra Jaffe, PT  3/12/2025

## 2025-03-12 NOTE — PLAN OF CARE
Problem: Adult Inpatient Plan of Care  Goal: Plan of Care Review  Outcome: Progressing  Goal: Patient-Specific Goal (Individualized)  Outcome: Progressing  Goal: Absence of Hospital-Acquired Illness or Injury  Outcome: Progressing  Intervention: Identify and Manage Fall Risk  Recent Flowsheet Documentation  Taken 3/12/2025 1659 by Codi Montenegro RN  Safety Promotion/Fall Prevention: safety round/check completed  Taken 3/12/2025 1410 by Codi Montenegro RN  Safety Promotion/Fall Prevention: safety round/check completed  Taken 3/12/2025 1200 by Codi Montenegro RN  Safety Promotion/Fall Prevention: safety round/check completed  Intervention: Prevent Skin Injury  Recent Flowsheet Documentation  Taken 3/12/2025 1200 by Codi Montenegro RN  Body Position: position changed independently  Skin Protection: incontinence pads utilized  Intervention: Prevent and Manage VTE (Venous Thromboembolism) Risk  Recent Flowsheet Documentation  Taken 3/12/2025 1200 by Codi Montenegro RN  VTE Prevention/Management:   bilateral   SCDs (sequential compression devices) off  Intervention: Prevent Infection  Recent Flowsheet Documentation  Taken 3/12/2025 1200 by Codi Montenegro RN  Infection Prevention: single patient room provided  Goal: Optimal Comfort and Wellbeing  Outcome: Progressing  Intervention: Provide Person-Centered Care  Recent Flowsheet Documentation  Taken 3/12/2025 1200 by Codi Montenegro RN  Trust Relationship/Rapport:   care explained   choices provided   empathic listening provided   emotional support provided  Goal: Readiness for Transition of Care  Outcome: Progressing  Intervention: Mutually Develop Transition Plan  Recent Flowsheet Documentation  Taken 3/12/2025 1800 by Codi Montenegro RN  Transportation Anticipated: family or friend will provide  Patient/Family Anticipated Services at Transition: none  Patient/Family Anticipates Transition to: home  Taken 3/12/2025 1759 by  Codi Montenegro, RN  Equipment Currently Used at Home: walker, standard     Problem: Breathing Pattern Ineffective  Goal: Effective Breathing Pattern  Outcome: Progressing     Problem: Chest Pain  Goal: Resolution of Chest Pain Symptoms  Outcome: Progressing   Goal Outcome Evaluation:   Pt has been relaxing in bed with her daughter at bedside. PIV was removed due to infiltration. NS at 100ml/hr remains continuous. She also remains an assist of one to the bathroom. No concerns at this time and call light within reach.

## 2025-03-12 NOTE — ED NOTES
Unable to get blood from ultrasound placed IV. Tech was able to get blood for 2 tubes. Charge RN reached out to phlebotomist for blood cultures and additional tubes.

## 2025-03-12 NOTE — ED PROVIDER NOTES
Subjective   History of Present Illness  84-year-old female referred to the ED for leukocytosis.  Patient has a known history of CLL and was seen today by her PCP for a wellness check.  Patient states she has had moderate fatigue for the past 2 weeks without clear etiology.  Patient denies any fevers.  Patient has a mild chronic cough, unchanged.  Patient had a chest x-ray done the day which appears normal per my interpretation.  No chest or abdominal pain, no vomiting or diarrhea, no urinary symptoms.      Review of Systems   Constitutional:  Positive for fatigue.   Respiratory:  Positive for cough.        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/2019    Depression     Disease of thyroid gland     Diverticulosis of colon 2018    Identified on colonoscopy    DVT (deep venous thrombosis)     Dysphagia 12/2020    Dyspnea on exertion     Fall at home 10/11/2023    Hemorrhoid     Hiatal hernia 12/2020    Hiatal hernia with GERD     large hiatal hernia with high-grade reflux on barium swallow; s/p laparoscopic fundoplication with gastropexy    History of breast cancer 10/11/2023    History of cigarette smoking 10/11/2023    History of diabetes mellitus     no meds now    Hyperlipidemia     Hypertension     Knee swelling     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    aspiration pneumonia; completed abx therapy    OAB (overactive bladder)     Personal history of CLL (chronic lymphocytic leukemia) 10/11/2023    Pulmonary emboli     Skin cancer     Sleep apnea     daughter states pt does not have and doesn't have machine       No Known Allergies    Past Surgical History:   Procedure Laterality Date    BLADDER SURGERY      BREAST BIOPSY      BRONCHOSCOPY N/A 09/13/2019    Procedure: BRONCHOSCOPY with bronchial  washing;  Surgeon: Marnie Frankel MD;  Location: [facility] ENDOSCOPY;  Service: Pulmonary    BRONCHOSCOPY Bilateral 10/18/2023    Procedure: BRONCHOSCOPY AT BEDSIDE;  Surgeon: Vinicius Heredia DO;  Location: [facility] ENDOSCOPY;  Service: Pulmonary;  Laterality: Bilateral;    COLONOSCOPY  2018    severe diverticulosis    CYST REMOVAL      Removed a fatty cyst off of her back    ENDOSCOPY N/A 2020    Procedure: ESOPHAGOGASTRODUODENOSCOPY with dilatation (Bougie # 48, 50, 52, 54, 56, 58);  Surgeon: Den Plummer DO;  Location: [facility] ENDOSCOPY;  Service: General;  Laterality: N/A;  Post: large hiatal hernia, joshua's ulcers    HIATAL HERNIA REPAIR N/A 2020    Procedure: Laparoscopic hiatal hernia repair with gastropexy;  Surgeon: Den Plummer DO;  Location: [facility] MAIN OR;  Service: General;  Laterality: N/A;    MASTECTOMY Right 2019    Invasive ductal carcinoma    TUBAL ABDOMINAL LIGATION         Family History   Problem Relation Age of Onset    Diabetes Mother     Arthritis Other     Heart disease Other        Social History     Socioeconomic History    Marital status:    Tobacco Use    Smoking status: Former     Current packs/day: 0.00     Average packs/day: 0.5 packs/day for 2.0 years (1.0 ttl pk-yrs)     Types: Cigarettes     Start date: 1990     Quit date: 1992     Years since quittin.2    Smokeless tobacco: Never   Vaping Use    Vaping status: Never Used   Substance and Sexual Activity    Alcohol use: No    Drug use: No    Sexual activity: Not Currently     Partners: Male           Objective   Physical Exam  Constitutional:       Appearance: Normal appearance.   HENT:      Head: Normocephalic and atraumatic.      Mouth/Throat:      Mouth: Mucous membranes are moist.      Pharynx: Oropharynx is clear.   Eyes:      Conjunctiva/sclera: Conjunctivae normal.      Pupils: Pupils are equal, round, and reactive to light.   Cardiovascular:      Rate and Rhythm: Normal  rate and regular rhythm.      Heart sounds: Normal heart sounds.   Pulmonary:      Effort: Pulmonary effort is normal.      Breath sounds: Normal breath sounds.   Abdominal:      General: Bowel sounds are normal. There is no distension.      Palpations: Abdomen is soft.      Tenderness: There is no abdominal tenderness.   Skin:     General: Skin is warm and dry.      Capillary Refill: Capillary refill takes less than 2 seconds.   Neurological:      General: No focal deficit present.      Mental Status: She is alert and oriented to person, place, and time.   Psychiatric:         Mood and Affect: Mood normal.         Behavior: Behavior normal.         Procedures           ED Course                                                       Medical Decision Making  Patient appears well on reevaluation, found to have LEXUS, acute UTI, will observe in the hospital, hydrate, IV antibiotics, a.m. hematology consultation, case reviewed with Cecelia with hospital service, agrees with plan.    Problems Addressed:  Acute UTI: complicated acute illness or injury  LEXUS (acute kidney injury): complicated acute illness or injury  CLL (chronic lymphocytic leukemia): complicated acute illness or injury    Amount and/or Complexity of Data Reviewed  External Data Reviewed: notes.     Details: Hematology note reviewed from 2023, note of CLL  Labs: ordered.     Details: White blood cell count 34  Radiology: independent interpretation performed.     Details: Chest x-ray done today without any acute disease per my interpretation  ECG/medicine tests: ordered and independent interpretation performed.     Details: EKG interpretation: Sinus rhythm, rate 65, no acute ST elevation    Risk  Prescription drug management.  Decision regarding hospitalization.        Final diagnoses:   Acute UTI   LEXUS (acute kidney injury)   CLL (chronic lymphocytic leukemia)       ED Disposition  ED Disposition       ED Disposition   Decision to Admit    Condition   --     Comment   Level of Care: Med/Surg [1]   Admitting Physician: JESSICA MOLINA [[id number]]                 No follow-up provider specified.       Medication List      No changes were made to your prescriptions during this visit.            Den Lewis MD  03/12/25 0334

## 2025-03-12 NOTE — ED NOTES
Unsuccessful iv attempt x 1. Pt restricted to 1 extremity for iv and labs, ultrasound iv trained nurse contacted and will attempt iv access.

## 2025-03-12 NOTE — CONSULTS
Midline Line Insertion Procedure Note    Procedure: Insertion of #18G/10CM PowerMidline    Indications:  Poor Access, Pt./Dr. Preference    Procedure Details:   Sterile technique was used including antiseptics, cap, gloves, gown, hand hygiene, mask, and sheet.    #18G/10CM PowerMidline inserted to the L Basilic vein per hospital protocol by Carlos Lopez RN.     Non-pulsatile blood return: yes    Ultrasound Guidance: Yes    Lot #: [id number]  Expiration date: 2026-01-31    Complications:  none    Findings:  Catheter inserted to 10 cm, with 0 cm exposed.   Mid upper arm circumference is 33 cm.  Catheter was flushed with 20 cc NS and sterile dressing applied.   Patient tolerated procedure well.    Recommendations:  Verbal and/or written Care/Maintenance instructions provided to patient.   Primary nurse notified that midline is okay to use at this time.     Vinicius Lopez RN  03/12/25  14:15 EDT

## 2025-03-12 NOTE — CONSULTS
Hematology/Oncology Inpatient Consultation    Patient name: Diane Guan  : 1940  MRN: [id number]  Referring Provider: Dr. Myers  Reason for Consultation: CLL    Chief complaint: Fatigue    History of present illness:    Diane Guan is a 84 y.o. female who presented to [facility] on 3/11/2025 with complaints of fatigue and leukocytosis.  Past medical history of CLL, CKD, DM, hyperlipidemia, hypertension.  Patient reported she had been having an ongoing fatigue for 2 weeks.  She had an appointment with her PCP and her WBC was 38.6 so her PCP instructed her to come to the ED for evaluation due to her history of CLL.  In the ED her WBC was 34.48, creatinine 1.84 otherwise labs were unremarkable.  UA was positive for nitrites, moderate leukocytes, 4+ bacteria.  She was given IV Rocephin in the ED and the ED provider consulted hematology.  She was admitted for further treatment of her UTI.    25  Hematology/Oncology was consulted for leukocytosis in a patient with a history of CLL and current UTI.  Patient is known to us and has been seen previously by Dr. 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  Oncological history per the chart as follows:    Chronic lymphocytic leukemia clinical stage 0 diagnosed in 2017 on peripheral blood flow cytometry.  Was ordered due to persistent leukocytosis with differential lymphocytosis patient had CT scans which do not show any significant lymphadenopathy at that time and therefore she was observed.  Follow-up CT scans completed in 2022 showed no evidence of progressive lymphadenopathy.  Also clinical stage II right breast cancer status post right mastectomy treated with Arimidex that was initiated in .  Patient has been lost to follow-up in our office  since 9/15/2023.  She was seen during a previous hospitalization in November 2023.    Patient has discontinued AI therapy. She states she is beginning to feel better since admission        He/She  has a past medical history of Acute bronchitis due to human metapneumovirus (02/08/2020), Anxiety disorder, Arthritis, Arthritis of back, Breast cancer (02/2019), Chronic lymphocytic leukemia (CLL), B-cell (01/2019), Depression, Disease of thyroid gland, Diverticulosis of colon (2018), DVT (deep venous thrombosis), Dysphagia (12/2020), Dyspnea on exertion, Fall at home (10/11/2023), Hemorrhoid, Hiatal hernia (12/2020), Hiatal hernia with GERD, History of breast cancer (10/11/2023), History of cigarette smoking (10/11/2023), History of diabetes mellitus, Hyperlipidemia, Hypertension, Knee swelling, Mycobacterium avium complex (02/2019), OAB (overactive bladder), Personal history of CLL (chronic lymphocytic leukemia) (10/11/2023), Pulmonary emboli, Skin cancer, and Sleep apnea.    PCP: Asia Queen, APRN    History:  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/2019    Depression     Disease of thyroid gland     Diverticulosis of colon 2018    Identified on colonoscopy    DVT (deep venous thrombosis)     Dysphagia 12/2020    Dyspnea on exertion     Fall at home 10/11/2023    Hemorrhoid     Hiatal hernia 12/2020    Hiatal hernia with GERD     large hiatal hernia with high-grade reflux on barium swallow; s/p laparoscopic fundoplication with gastropexy    History of breast cancer 10/11/2023    History of cigarette smoking 10/11/2023    History of diabetes mellitus     no meds now    Hyperlipidemia     Hypertension     Knee swelling     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    aspiration pneumonia; completed abx  therapy    OAB (overactive bladder)     Personal history of CLL (chronic lymphocytic leukemia) 10/11/2023    Pulmonary emboli     Skin cancer     Sleep apnea     daughter states pt does not have and doesn't have machine   ,   Past Surgical History:   Procedure Laterality Date    BLADDER SURGERY      BREAST BIOPSY      BRONCHOSCOPY N/A 2019    Procedure: BRONCHOSCOPY with bronchial washing;  Surgeon: Marnie Frankel MD;  Location: [facility] ENDOSCOPY;  Service: Pulmonary    BRONCHOSCOPY Bilateral 10/18/2023    Procedure: BRONCHOSCOPY AT BEDSIDE;  Surgeon: Vinicius Heredia DO;  Location: [facility] ENDOSCOPY;  Service: Pulmonary;  Laterality: Bilateral;    COLONOSCOPY  2018    severe diverticulosis    CYST REMOVAL      Removed a fatty cyst off of her back    ENDOSCOPY N/A 2020    Procedure: ESOPHAGOGASTRODUODENOSCOPY with dilatation (Bougie # 48, 50, 52, 54, 56, 58);  Surgeon: Den Plummer DO;  Location: [facility] ENDOSCOPY;  Service: General;  Laterality: N/A;  Post: large hiatal hernia, joshua's ulcers    HIATAL HERNIA REPAIR N/A 2020    Procedure: Laparoscopic hiatal hernia repair with gastropexy;  Surgeon: Den Plummer DO;  Location: [facility] MAIN OR;  Service: General;  Laterality: N/A;    MASTECTOMY Right 2019    Invasive ductal carcinoma    TUBAL ABDOMINAL LIGATION     ,   Family History   Problem Relation Age of Onset    Diabetes Mother     Arthritis Other     Heart disease Other    ,   Social History     Tobacco Use    Smoking status: Former     Current packs/day: 0.00     Average packs/day: 0.5 packs/day for 2.0 years (1.0 ttl pk-yrs)     Types: Cigarettes     Start date: 1990     Quit date: 1992     Years since quittin.2    Smokeless tobacco: Never   Vaping Use    Vaping status: Never Used   Substance Use Topics    Alcohol use: No    Drug use: No   ,   Medications Prior to Admission   Medication Sig Dispense Refill Last Dose/Taking    amLODIPine (NORVASC) 10 MG  tablet TAKE 1 TABLET BY MOUTH DAILY FOR HIGH BLOOD PRESSURE 90 tablet 1 Taking    chlorthalidone (HYGROTON) 25 MG tablet TAKE 1 TABLET BY MOUTH DAILY. INDICATIONS: EDEMA 90 tablet 1 Taking    docusate sodium (COLACE) 100 MG capsule Take 1 capsule by mouth 2 (Two) Times a Day. Indications: Constipation  3 Taking    ferrous sulfate 324 (65 Fe) MG tablet delayed-release EC tablet Take 1 tablet by mouth 3 (Three) Times a Week. Indications: Iron Deficiency   Taking    irbesartan (AVAPRO) 300 MG tablet TAKE 1 TABLET BY MOUTH DAILY FOR HIGH BLOOD PRESSURE 90 tablet 1 Taking    levothyroxine (SYNTHROID, LEVOTHROID) 100 MCG tablet TAKE 1 TABLET BY MOUTH EVERY DAY 90 tablet 3 Taking    metFORMIN (GLUCOPHAGE) 500 MG tablet TAKE 1 TABLET BY MOUTH EVERY DAY WITH BREAKFAST 90 tablet 3 Taking    metoclopramide (REGLAN) 5 MG tablet TAKE 1 TABLET BY MOUTH 3 (THREE) TIMES A DAY AS NEEDED (FOR NAUSEA AND VOMITING). 270 tablet 2 Taking As Needed    mirtazapine (REMERON) 15 MG tablet TAKE 1 TABLET BY MOUTH EVERYDAY AT BEDTIME 90 tablet 1 Taking    pantoprazole (PROTONIX) 40 MG EC tablet TAKE 1 TABLET BY MOUTH EVERY MORNING BEFORE BREAKFAST. TAKE ON AN EMPTY STOMACH. 90 tablet 1 Taking    rOPINIRole (REQUIP) 0.25 MG tablet TAKE 1 TABLET BY MOUTH DAILY AT NIGHT 1 HOUR BEFORE BEDTIME 90 tablet 3 Taking    rosuvastatin (CRESTOR) 10 MG tablet TAKE 1 TABLET BY MOUTH EVERY DAY AT NIGHT 90 tablet 3 Taking    sertraline (ZOLOFT) 100 MG tablet TAKE 1 AND 1/2 TABLETS BY MOUTH EVERY  tablet 3 Taking    Accu-Chek Softclix Lancets lancets 1 each by Other route Daily. Use to monitor blood sugar once daily 100 each 0     Alcohol Swabs ([employer] Alcohol Prep Pads) 70 % pads APPLY 1 EACH TOPICALLY DAILY.       apixaban (ELIQUIS) 5 MG tablet tablet Take 1 tablet by mouth 2 (Two) Times a Day. 60 tablet 6     Blood Glucose Monitoring Suppl (Accu-Chek Guide) w/Device kit Use 1 kit Daily. Use to monitor blood sugar once daily 1 kit 0     Hearing Aid  Batteries misc 1 Units Every 7 (Seven) Days. 52 each 0    , Scheduled Meds:  amLODIPine, 10 mg, Oral, Daily  apixaban, 5 mg, Oral, BID  [START ON 3/13/2025] cefTRIAXone, 2,000 mg, Intravenous, Daily  [Held by provider] chlorthalidone, 25 mg, Oral, Daily  docusate sodium, 100 mg, Oral, BID  insulin lispro, 2-9 Units, Subcutaneous, 4x Daily AC & at Bedtime  levothyroxine, 100 mcg, Oral, Q AM  mirtazapine, 15 mg, Oral, Nightly  pantoprazole, 40 mg, Oral, Q AM  rOPINIRole, 0.25 mg, Oral, Nightly  rosuvastatin, 10 mg, Oral, Nightly  sertraline, 150 mg, Oral, Daily  sodium chloride, 10 mL, Intravenous, Q12H  sodium chloride, 10 mL, Intravenous, Q12H  sodium chloride, 10 mL, Intravenous, Q12H    , Continuous Infusions:  sodium chloride, 100 mL/hr, Last Rate: 100 mL/hr (03/12/25 0847)  sodium chloride, 100 mL/hr    , PRN Meds:    acetaminophen **OR** acetaminophen **OR** acetaminophen    senna-docusate sodium **AND** polyethylene glycol **AND** bisacodyl **AND** bisacodyl    dextrose    dextrose    glucagon (human recombinant)    melatonin    ondansetron    sodium chloride    sodium chloride    sodium chloride    sodium chloride    sodium chloride    sodium chloride    sodium chloride   Allergies:  Patient has no known allergies.    Subjective     ROS:  Review of Systems   Constitutional:  Positive for fatigue. Negative for chills and fever.   HENT:  Negative for congestion, drooling, ear discharge, rhinorrhea, sinus pressure and tinnitus.    Eyes:  Negative for photophobia, pain and discharge.   Respiratory:  Positive for shortness of breath. Negative for apnea, choking and stridor.    Cardiovascular:  Negative for palpitations.   Gastrointestinal:  Negative for abdominal distention, abdominal pain and anal bleeding.   Endocrine: Negative for polydipsia and polyphagia.   Genitourinary:  Negative for decreased urine volume, flank pain and genital sores.   Musculoskeletal:  Negative for gait problem, neck pain and neck  "stiffness.   Skin:  Negative for color change, rash and wound.   Neurological:  Positive for weakness. Negative for tremors, seizures, syncope, facial asymmetry and speech difficulty.   Hematological:  Negative for adenopathy.   Psychiatric/Behavioral:  Negative for agitation, confusion, hallucinations and self-injury. The patient is not hyperactive.         Objective   Vital Signs:   /47 (BP Location: Left arm, Patient Position: Lying)   Pulse 62   Temp 97.7 °F (36.5 °C) (Oral)   Resp 14   Ht 160 cm (63\")   Wt 77.7 kg (171 lb 4.8 oz)   SpO2 93%   BMI 30.34 kg/m²     Physical Exam: (performed by MD)  Physical Exam  Vitals and nursing note reviewed.   Constitutional:       General: She is not in acute distress.     Appearance: She is ill-appearing. She is not diaphoretic.   HENT:      Head: Normocephalic and atraumatic.   Eyes:      General: No scleral icterus.        Right eye: No discharge.         Left eye: No discharge.      Conjunctiva/sclera: Conjunctivae normal.   Neck:      Thyroid: No thyromegaly.   Cardiovascular:      Rate and Rhythm: Normal rate and regular rhythm.      Heart sounds: Normal heart sounds.      No friction rub. No gallop.   Pulmonary:      Effort: Pulmonary effort is normal. No respiratory distress.      Breath sounds: No stridor. No wheezing.   Abdominal:      General: Bowel sounds are normal.      Palpations: Abdomen is soft. There is no mass.      Tenderness: There is no abdominal tenderness. There is no guarding or rebound.   Musculoskeletal:         General: No tenderness. Normal range of motion.      Cervical back: Normal range of motion and neck supple.   Lymphadenopathy:      Cervical: No cervical adenopathy.   Skin:     General: Skin is warm.      Findings: No erythema or rash.   Neurological:      Mental Status: She is alert and oriented to person, place, and time.      Motor: No abnormal muscle tone.   Psychiatric:         Behavior: Behavior normal.         Results " Review:  Lab Results (last 48 hours)       Procedure Component Value Units Date/Time    POC Glucose Once [[id number]]  (Abnormal) Collected: 03/12/25 0745    Specimen: Blood Updated: 03/12/25 0746     Glucose 119 mg/dL      Comment: Serial Number: [id number]:  [id number]       High Sensitivity Troponin T 1Hr [[id number]]  (Abnormal) Collected: 03/12/25 0304    Specimen: Blood Updated: 03/12/25 0329     HS Troponin T 28 ng/L      Troponin T Numeric Delta -3 ng/L      Troponin T % Delta -10    Narrative:      High Sensitive Troponin T Reference Range:  <14.0 ng/L- Negative Female for AMI  <22.0 ng/L- Negative Male for AMI  >=14 - Abnormal Female indicating possible myocardial injury.  >=22 - Abnormal Male indicating possible myocardial injury.   Clinicians would have to utilize clinical acumen, EKG, Troponin, and serial changes to determine if it is an Acute Myocardial Infarction or myocardial injury due to an underlying chronic condition.         Urinalysis, Microscopic Only - Urine, Clean Catch [[id number]]  (Abnormal) Collected: 03/12/25 0203    Specimen: Urine, Clean Catch Updated: 03/12/25 0325     RBC, UA 0-2 /HPF      WBC, UA 21-50 /HPF      Bacteria, UA 4+ /HPF      Squamous Epithelial Cells, UA 3-6 /HPF      Hyaline Casts, UA 3-6 /LPF      Mucus, UA Trace /HPF      Methodology Manual Light Microscopy    Urine Culture - Urine, Urine, Clean Catch [[id number]] Collected: 03/12/25 0203    Specimen: Urine, Clean Catch Updated: 03/12/25 0325    [place] Draw [[id number]] Collected: 03/12/25 0155    Specimen: Blood Updated: 03/12/25 0315    Narrative:      The following orders were created for panel order [place] Draw.  Procedure                               Abnormality         Status                     ---------                               -----------         ------                     Green Top (Gel)[[id number]]                                  Final result               Lavender Top[[id number]]                                      Final result               Gold Top - SST[[id number]]                                   Final result               Light Blue Top[[id number]]                                   Final result                 Please view results for these tests on the individual orders.    Gold Top - SST [[id number]] Collected: 03/12/25 0304    Specimen: Blood Updated: 03/12/25 0315     Extra Tube Hold for add-ons.     Comment: Auto resulted.       Light Blue Top [[id number]] Collected: 03/12/25 0304    Specimen: Blood Updated: 03/12/25 0315     Extra Tube Hold for add-ons.     Comment: Auto resulted       Blood Culture - Blood, Hand, Left [[id number]] Collected: 03/12/25 0303    Specimen: Blood from Hand, Left Updated: 03/12/25 0309    Blood Culture - Blood, Arm, Left [[id number]] Collected: 03/12/25 0304    Specimen: Blood from Arm, Left Updated: 03/12/25 0309    CBC & Differential [[id number]]  (Abnormal) Collected: 03/12/25 0155    Specimen: Blood Updated: 03/12/25 0240    Narrative:      The following orders were created for panel order CBC & Differential.  Procedure                               Abnormality         Status                     ---------                               -----------         ------                     CBC Auto Differential[[id number]]        Abnormal            Final result               Scan Slide[[id number]]                                       Final result                 Please view results for these tests on the individual orders.    CBC Auto Differential [[id number]]  (Abnormal) Collected: 03/12/25 0155    Specimen: Blood Updated: 03/12/25 0240     WBC 34.48 10*3/mm3      RBC 4.30 10*6/mm3      Hemoglobin 11.8 g/dL      Hematocrit 41.5 %      MCV 96.5 fL      MCH 27.4 pg      MCHC 28.4 g/dL      RDW 16.4 %      RDW-SD 58.4 fl      MPV 12.5 fL      Platelets 151 10*3/mm3     Scan Slide [[id number]] Collected: 03/12/25 0155    Specimen: Blood Updated: 03/12/25 0240     Scan Slide --      Comment: See Manual Differential Results       Manual Differential [[id number]]  (Abnormal) Collected: 03/12/25 0155    Specimen: Blood Updated: 03/12/25 0240     Neutrophil % 13.0 %      Lymphocyte % 86.0 %      Monocyte % 1.0 %      Neutrophils Absolute 4.48 10*3/mm3      Lymphocytes Absolute 29.65 10*3/mm3      Monocytes Absolute 0.34 10*3/mm3      Anisocytosis Slight/1+     Smudge Cells Slight/1+     Platelet Estimate Adequate     Large Platelets Slight/1+    Comprehensive Metabolic Panel [[id number]]  (Abnormal) Collected: 03/12/25 0155    Specimen: Blood Updated: 03/12/25 0225     Glucose 163 mg/dL      BUN 44 mg/dL      Creatinine 1.84 mg/dL      Sodium 140 mmol/L      Potassium 4.2 mmol/L      Comment: Slight hemolysis detected by analyzer. Result may be falsely elevated.        Chloride 107 mmol/L      CO2 18.0 mmol/L      Calcium 8.8 mg/dL      Total Protein 6.4 g/dL      Albumin 4.0 g/dL      ALT (SGPT) 10 U/L      AST (SGOT) 19 U/L      Comment: Slight hemolysis detected by analyzer. Result may be falsely elevated.        Alkaline Phosphatase 109 U/L      Total Bilirubin 0.3 mg/dL      Globulin 2.4 gm/dL      A/G Ratio 1.7 g/dL      BUN/Creatinine Ratio 23.9     Anion Gap 15.0 mmol/L      eGFR 26.9 mL/min/1.73     Narrative:      GFR Categories in Chronic Kidney Disease (CKD)      GFR Category          GFR (mL/min/1.73)    Interpretation  G1                     90 or greater         Normal or high (1)  G2                      60-89                Mild decrease (1)  G3a                   45-59                Mild to moderate decrease  G3b                   30-44                Moderate to severe decrease  G4                    15-29                Severe decrease  G5                    14 or less           Kidney failure          (1)In the absence of evidence of kidney disease, neither GFR category G1 or G2 fulfill the criteria for CKD.    eGFR calculation 2021 CKD-EPI creatinine equation, which does not  include race as a factor    Urinalysis With Culture If Indicated - Urine, Clean Catch [[id number]]  (Abnormal) Collected: 03/12/25 0203    Specimen: Urine, Clean Catch Updated: 03/12/25 0223     Color, UA Yellow     Appearance, UA Cloudy     pH, UA <=5.0     Specific Gravity, UA 1.015     Glucose, UA Negative     Ketones, UA Negative     Bilirubin, UA Negative     Blood, UA Negative     Protein, UA Negative     Leuk Esterase, UA Moderate (2+)     Nitrite, UA Positive     Urobilinogen, UA 1.0 E.U./dL    Narrative:      In absence of clinical symptoms, the presence of pyuria, bacteria, and/or nitrites on the urinalysis result does not correlate with infection.    C-reactive Protein [[id number]]  (Normal) Collected: 03/12/25 0155    Specimen: Blood Updated: 03/12/25 0222     C-Reactive Protein <0.30 mg/dL     High Sensitivity Troponin T [[id number]]  (Abnormal) Collected: 03/12/25 0155    Specimen: Blood Updated: 03/12/25 0222     HS Troponin T 31 ng/L     Narrative:      High Sensitive Troponin T Reference Range:  <14.0 ng/L- Negative Female for AMI  <22.0 ng/L- Negative Male for AMI  >=14 - Abnormal Female indicating possible myocardial injury.  >=22 - Abnormal Male indicating possible myocardial injury.   Clinicians would have to utilize clinical acumen, EKG, Troponin, and serial changes to determine if it is an Acute Myocardial Infarction or myocardial injury due to an underlying chronic condition.         Sedimentation Rate [[id number]]  (Normal) Collected: 03/12/25 0155    Specimen: Blood Updated: 03/12/25 0212     Sed Rate 19 mm/hr     POC Lactate [[id number]]  (Normal) Collected: 03/12/25 0206    Specimen: Blood Updated: 03/12/25 0208     Lactate 1.1 mmol/L      Comment: Serial Number: [id number]:  [id number]       Green Top (Gel) [[id number]] Collected: 03/12/25 0155    Specimen: Blood Updated: 03/12/25 0200     Extra Tube Hold for add-ons.     Comment: Auto resulted.       Lavender Top [[id number]] Collected:  03/12/25 0155    Specimen: Blood Updated: 03/12/25 0200     Extra Tube hold for add-on     Comment: Auto resulted       COVID-19, FLU A/B, RSV PCR 1 HR TAT - Swab, Nasopharynx [[id number]]  (Normal) Collected: 03/12/25 0050    Specimen: Swab from Nasopharynx Updated: 03/12/25 0132     COVID19 Not Detected     Influenza A PCR Not Detected     Influenza B PCR Not Detected     RSV, PCR Not Detected    Narrative:      Fact sheet for providers: https://www.fda.gov/media/587221/download    Fact sheet for patients: https://www.fda.gov/media/037549/download    Test performed by PCR.             Pending Results:     Imaging Reviewed:   No radiology results for the last 7 days         Assessment & Plan   ASSESSMENT  Chronic lymphocytic leukemia: First diagnosed in 2017.  Has not required any treatment since her diagnosis.  WBC currently 30.20 down from 38.96 with the initiation of IV antibiotics for treatment of her UTI.  Baseline WBC over the past months 20-30,000.  Absolute lymphocytes 25.07.  History of stage II right breast cancer status post right mastectomy.  ER positive, TN positive, HER2/juan luis negative.  Previously on Arimidex that was started in 2019.  UTI: Urine culture and blood cultures pending.  Patient received IV Rocephin in the ED.  History of pulmonary emboli/acute left lower extremity DVT: Diagnosed in July 2024.  On Eliquis.  Mild macrocytic anemia: Hemoglobin 11.3 g/dL, .5.  Continue to monitor.    PLAN  Continue to treat acute infection  Monitor CBC    Further recommendations per Dr. Bolden  Electronically signed by SIOMARA Rivers, 03/12/25, 9:10 AM EDT.    Thank you for this consult. We will be happy to follow along with you.     Hematology/Oncology was consulted for leukocytosis in a patient with a history of CLL and current UTI.  Patient is known to us and has been seen previously by Dr. Bolden in our office for her diagnoses of stage II right breast cancer for which she underwent right  mastectomy, ER, [place] positive HER2/juan luis negative CLL diagnosed in August 2017 during a workup for persistent leukocytosis.  Oncological history per the chart as follows:    Chronic lymphocytic leukemia clinical stage 0 diagnosed in 2017 on peripheral blood flow cytometry.  Was ordered due to persistent leukocytosis with differential lymphocytosis patient had CT scans which do not show any significant lymphadenopathy at that time and therefore she was observed.  Follow-up CT scans completed in September 2022 showed no evidence of progressive lymphadenopathy.  Also clinical stage II right breast cancer status post right mastectomy treated with Arimidex that was initiated in 2019.  Patient has been lost to follow-up in our office since 9/15/2023.  She was seen during a previous hospitalization in November 2023.    Patient has discontinued AI therapy. She states she is beginning to feel better since admission    PE is as above    Stability studies progress Notes  Continue antibiotics for UTI   Her CLL is Stable.  Patient has not required any treatment for CLL.  Daily CBCs.  Patient has discontinued Arimidex.    Will continue to follow.      Electronically signed by Ling Bolden MD, 03/13/25, 7:36 AM EDT.

## 2025-03-13 LAB
ANION GAP SERPL CALCULATED.3IONS-SCNC: 7.1 MMOL/L (ref 5–15)
BACTERIA SPEC AEROBE CULT: ABNORMAL
BACTERIA SPEC AEROBE CULT: NORMAL
BASOPHILS # BLD MANUAL: 0.55 10*3/MM3 (ref 0–0.2)
BASOPHILS NFR BLD MANUAL: 2 % (ref 0–1.5)
BUN SERPL-MCNC: 37 MG/DL (ref 8–23)
BUN/CREAT SERPL: 28 (ref 7–25)
CALCIUM SPEC-SCNC: 8.7 MG/DL (ref 8.6–10.5)
CHLORIDE SERPL-SCNC: 110 MMOL/L (ref 98–107)
CO2 SERPL-SCNC: 20.9 MMOL/L (ref 22–29)
CREAT SERPL-MCNC: 1.32 MG/DL (ref 0.57–1)
DEPRECATED RDW RBC AUTO: 55.7 FL (ref 37–54)
EGFRCR SERPLBLD CKD-EPI 2021: 40.1 ML/MIN/1.73
EOSINOPHIL # BLD MANUAL: 0.55 10*3/MM3 (ref 0–0.4)
EOSINOPHIL NFR BLD MANUAL: 2 % (ref 0.3–6.2)
ERYTHROCYTE [DISTWIDTH] IN BLOOD BY AUTOMATED COUNT: 16 % (ref 12.3–15.4)
GLUCOSE BLDC GLUCOMTR-MCNC: 112 MG/DL (ref 70–105)
GLUCOSE BLDC GLUCOMTR-MCNC: 124 MG/DL (ref 70–105)
GLUCOSE BLDC GLUCOMTR-MCNC: 125 MG/DL (ref 70–105)
GLUCOSE BLDC GLUCOMTR-MCNC: 138 MG/DL (ref 70–105)
GLUCOSE SERPL-MCNC: 111 MG/DL (ref 65–99)
HCT VFR BLD AUTO: 34.4 % (ref 34–46.6)
HGB BLD-MCNC: 10 G/DL (ref 12–15.9)
LAB AP CASE REPORT: NORMAL
LARGE PLATELETS: ABNORMAL
LYMPHOCYTES # BLD MANUAL: 16.9 10*3/MM3 (ref 0.7–3.1)
LYMPHOCYTES NFR BLD MANUAL: 4 % (ref 5–12)
MCH RBC QN AUTO: 27.2 PG (ref 26.6–33)
MCHC RBC AUTO-ENTMCNC: 29.1 G/DL (ref 31.5–35.7)
MCV RBC AUTO: 93.7 FL (ref 79–97)
MONOCYTES # BLD: 1.09 10*3/MM3 (ref 0.1–0.9)
NEUTROPHILS # BLD AUTO: 8.18 10*3/MM3 (ref 1.7–7)
NEUTROPHILS NFR BLD MANUAL: 29 % (ref 42.7–76)
NEUTS BAND NFR BLD MANUAL: 1 % (ref 0–5)
PATH REPORT.FINAL DX SPEC: NORMAL
PLATELET # BLD AUTO: 125 10*3/MM3 (ref 140–450)
PMV BLD AUTO: 12.8 FL (ref 6–12)
POIKILOCYTOSIS BLD QL SMEAR: ABNORMAL
POTASSIUM SERPL-SCNC: 4.2 MMOL/L (ref 3.5–5.2)
RBC # BLD AUTO: 3.67 10*6/MM3 (ref 3.77–5.28)
SCAN SLIDE: NORMAL
SMALL PLATELETS BLD QL SMEAR: ADEQUATE
SODIUM SERPL-SCNC: 138 MMOL/L (ref 136–145)
VARIANT LYMPHS NFR BLD MANUAL: 1 % (ref 0–5)
VARIANT LYMPHS NFR BLD MANUAL: 61 % (ref 19.6–45.3)
WBC MORPH BLD: NORMAL
WBC NRBC COR # BLD AUTO: 27.26 10*3/MM3 (ref 3.4–10.8)

## 2025-03-13 PROCEDURE — 85025 COMPLETE CBC W/AUTO DIFF WBC: CPT

## 2025-03-13 PROCEDURE — 25810000003 SODIUM CHLORIDE 0.9 % SOLUTION: Performed by: STUDENT IN AN ORGANIZED HEALTH CARE EDUCATION/TRAINING PROGRAM

## 2025-03-13 PROCEDURE — 97166 OT EVAL MOD COMPLEX 45 MIN: CPT

## 2025-03-13 PROCEDURE — 97530 THERAPEUTIC ACTIVITIES: CPT

## 2025-03-13 PROCEDURE — 97110 THERAPEUTIC EXERCISES: CPT

## 2025-03-13 PROCEDURE — 80048 BASIC METABOLIC PNL TOTAL CA: CPT

## 2025-03-13 PROCEDURE — 97116 GAIT TRAINING THERAPY: CPT

## 2025-03-13 PROCEDURE — 85007 BL SMEAR W/DIFF WBC COUNT: CPT

## 2025-03-13 PROCEDURE — 82948 REAGENT STRIP/BLOOD GLUCOSE: CPT

## 2025-03-13 PROCEDURE — 82948 REAGENT STRIP/BLOOD GLUCOSE: CPT | Performed by: INTERNAL MEDICINE

## 2025-03-13 PROCEDURE — 99232 SBSQ HOSP IP/OBS MODERATE 35: CPT | Performed by: INTERNAL MEDICINE

## 2025-03-13 PROCEDURE — 25010000002 CEFTRIAXONE PER 250 MG

## 2025-03-13 RX ORDER — SODIUM CHLORIDE 9 MG/ML
100 INJECTION, SOLUTION INTRAVENOUS CONTINUOUS
Status: DISCONTINUED | OUTPATIENT
Start: 2025-03-13 | End: 2025-03-14 | Stop reason: HOSPADM

## 2025-03-13 RX ADMIN — MIRTAZAPINE 15 MG: 15 TABLET, FILM COATED ORAL at 21:31

## 2025-03-13 RX ADMIN — LEVOTHYROXINE SODIUM 100 MCG: 100 TABLET ORAL at 05:10

## 2025-03-13 RX ADMIN — DOCUSATE SODIUM 100 MG: 100 CAPSULE, LIQUID FILLED ORAL at 21:32

## 2025-03-13 RX ADMIN — SODIUM CHLORIDE 100 ML/HR: 9 INJECTION, SOLUTION INTRAVENOUS at 09:29

## 2025-03-13 RX ADMIN — Medication 10 ML: at 09:29

## 2025-03-13 RX ADMIN — AMLODIPINE BESYLATE 10 MG: 5 TABLET ORAL at 09:28

## 2025-03-13 RX ADMIN — ROPINIROLE HYDROCHLORIDE 0.25 MG: 0.25 TABLET, FILM COATED ORAL at 21:31

## 2025-03-13 RX ADMIN — Medication 10 ML: at 21:34

## 2025-03-13 RX ADMIN — ROSUVASTATIN CALCIUM 10 MG: 10 TABLET, FILM COATED ORAL at 21:32

## 2025-03-13 RX ADMIN — DOCUSATE SODIUM 100 MG: 100 CAPSULE, LIQUID FILLED ORAL at 09:28

## 2025-03-13 RX ADMIN — APIXABAN 5 MG: 5 TABLET, FILM COATED ORAL at 09:28

## 2025-03-13 RX ADMIN — PANTOPRAZOLE SODIUM 40 MG: 40 TABLET, DELAYED RELEASE ORAL at 05:11

## 2025-03-13 RX ADMIN — SERTRALINE HYDROCHLORIDE 150 MG: 100 TABLET, FILM COATED ORAL at 09:28

## 2025-03-13 RX ADMIN — SODIUM CHLORIDE 100 ML/HR: 9 INJECTION, SOLUTION INTRAVENOUS at 20:58

## 2025-03-13 RX ADMIN — CEFTRIAXONE 2000 MG: 2 INJECTION, POWDER, FOR SOLUTION INTRAMUSCULAR; INTRAVENOUS at 05:11

## 2025-03-13 RX ADMIN — APIXABAN 5 MG: 5 TABLET, FILM COATED ORAL at 21:32

## 2025-03-13 NOTE — PLAN OF CARE
Problem: Adult Inpatient Plan of Care  Goal: Plan of Care Review  Outcome: Progressing  Goal: Patient-Specific Goal (Individualized)  Outcome: Progressing  Goal: Absence of Hospital-Acquired Illness or Injury  Outcome: Progressing  Intervention: Identify and Manage Fall Risk  Recent Flowsheet Documentation  Taken 3/13/2025 1400 by Codi Montenegro RN  Safety Promotion/Fall Prevention: safety round/check completed  Taken 3/13/2025 1200 by Codi Montenegro RN  Safety Promotion/Fall Prevention: safety round/check completed  Taken 3/13/2025 1018 by Codi Montenegro RN  Safety Promotion/Fall Prevention: safety round/check completed  Taken 3/13/2025 1000 by Codi Montenegro RN  Safety Promotion/Fall Prevention: safety round/check completed  Taken 3/13/2025 0859 by Codi Montenegro RN  Safety Promotion/Fall Prevention: safety round/check completed  Intervention: Prevent Skin Injury  Recent Flowsheet Documentation  Taken 3/13/2025 0859 by Codi Montenegro RN  Body Position: position changed independently  Skin Protection: incontinence pads utilized  Intervention: Prevent and Manage VTE (Venous Thromboembolism) Risk  Recent Flowsheet Documentation  Taken 3/13/2025 0859 by Codi Montenegro RN  VTE Prevention/Management:   bilateral   SCDs (sequential compression devices) off  Intervention: Prevent Infection  Recent Flowsheet Documentation  Taken 3/13/2025 0859 by Codi Montenegro RN  Infection Prevention: single patient room provided  Goal: Optimal Comfort and Wellbeing  Outcome: Progressing  Intervention: Provide Person-Centered Care  Recent Flowsheet Documentation  Taken 3/13/2025 0859 by Codi Montenegro RN  Trust Relationship/Rapport:   care explained   choices provided   emotional support provided   empathic listening provided  Goal: Readiness for Transition of Care  Outcome: Progressing     Problem: Breathing Pattern Ineffective  Goal: Effective Breathing Pattern  Outcome:  Progressing     Problem: Chest Pain  Goal: Resolution of Chest Pain Symptoms  Outcome: Progressing   Goal Outcome Evaluation:   Pt has been relaxing in bed throughout the day with family at bedside. She also worked with PT/OT and sat up in her recliner today. NS at 100ml/hr remains continuous as well. No concerns at this time and call light within reach.

## 2025-03-13 NOTE — PLAN OF CARE
"Assessment: Diane Guan presents with functional mobility impairments which indicate the need for skilled intervention. Pt able to tolerate ~80 ft ambulation SBA with RW and seated therapeutic exercise. Pt plans to d/c to daughters home for assist. Tolerating session today without incident. Will continue to follow and progress as tolerated.     Plan/Recommendations:   If medically appropriate, Low Intensity Therapy recommended post-acute care - This is recommended as therapy feels this patient would require 2-3 visits per week. OP or HH would be the best option depending on patient's home bound status. Consider, if the patient has other  \"skilled\" needs such as wounds, IV antibiotics, etc. Combined with \"low intensity\" could also equate to a SNF. If patient is medically complex, consider LTAC. Pt requires no DME at discharge.     Pt desires Home with family assist at discharge. Pt cooperative; agreeable to therapeutic recommendations and plan of care.                                          "

## 2025-03-13 NOTE — THERAPY TREATMENT NOTE
"Subjective: Pt agreeable to therapeutic plan of care.    Objective:     Precautions - fall    Bed mobility - Supervision  Transfers - SBA and with rolling walker  Ambulation - 80 feet SBA and with rolling walker    Therapeutic Exercise - 15 Reps B LE AROM supported sitting / chair    Vitals: WNL    Pain: 0 VAS Location: N/A  Intervention for pain: N/A    Education: Provided education on the importance of mobility in the acute care setting, Verbal/Tactile Cues, Transfer Training, Gait Training, Energy conservation strategies, and HEP    Assessment: Diane Guan presents with functional mobility impairments which indicate the need for skilled intervention. Pt able to tolerate ~80 ft ambulation SBA with RW and seated therapeutic exercise. Pt plans to d/c to daughters home for assist. Tolerating session today without incident. Will continue to follow and progress as tolerated.     Plan/Recommendations:   If medically appropriate, Low Intensity Therapy recommended post-acute care - This is recommended as therapy feels this patient would require 2-3 visits per week. OP or HH would be the best option depending on patient's home bound status. Consider, if the patient has other  \"skilled\" needs such as wounds, IV antibiotics, etc. Combined with \"low intensity\" could also equate to a SNF. If patient is medically complex, consider LTAC. Pt requires no DME at discharge.     Pt desires Home with family assist at discharge. Pt cooperative; agreeable to therapeutic recommendations and plan of care.     Modified [place]: N/A = No pre-op stroke/TIA    Post-Tx Position: Up in Chair, Alarms activated, and Call light and personal items within reach  PPE: gloves    Therapy Charges for Today       Code Description Service Date Service Provider Modifiers Qty    [id number]  GAIT TRAINING EA 15 MIN 3/13/2025 Kita Garcia, PT GP 1    [id number]  PT THERAPEUTIC ACT EA 15 MIN 3/13/2025 Kita Garcia, PT GP 1    " [id number]  PT THER PROC EA 15 MIN 3/13/2025 Kita Garcia, PT GP 1           PT Charges       Row Name 03/13/25 1144             Time Calculation    Start Time 0930  -      Stop Time 0958  -      Time Calculation (min) 28 min  -      PT Received On 03/13/25  -      PT - Next Appointment 03/15/25  -         Time Calculation- PT    Total Timed Code Minutes- PT 28 minute(s)  -                User Key  (r) = Recorded By, (t) = Taken By, (c) = Cosigned By      Initials Name Provider Type     Kita Garcia, PT Physical Therapist

## 2025-03-13 NOTE — PROGRESS NOTES
[facility] MEDICINE SERVICE  DAILY PROGRESS NOTE    NAME: Diane Guan  : 1940  MRN: [id number]      LOS: 0 days     PROVIDER OF SERVICE: Silva Whatley MD    Chief Complaint: <principal problem not specified>    Subjective:     Interval History:  History taken from: Patient and patient's chart     C/o mild cough        Review of Systems:   Review of Systems  All negative except above   Objective:     Vital Signs  Temp:  [97.6 °F (36.4 °C)-98.3 °F (36.8 °C)] 98.3 °F (36.8 °C)  Heart Rate:  [65-76] 68  Resp:  [12-16] 12  BP: (119-158)/(51-70) 158/70  Flow (L/min) (Oxygen Therapy):  [1-2] 2   Body mass index is 30.34 kg/m².    Physical Exam  Physical Exam  General: Alert and oriented, no acute distress.  HENT: Normocephalic, moist oral mucosa, no scleral icterus.  Neck: Supple, nontender, no carotid bruits, no JVD, no LAD.  Lungs: Clear to auscultation, nonlabored respiration.  Heart: RRR, no murmur, gallop or edema.  Abdomen: Soft, nontender, nondistended, + bowel sounds.  Musculoskeletal: Normal range of motion and strength, no tenderness or swelling.  Neuro: alert and awake, moving all 4 extremities         Diagnostic Data    Results from last 7 days   Lab Units 25  0426 25  0948 25  0155   WBC 10*3/mm3 27.26*   < > 34.48*   HEMOGLOBIN g/dL 10.0*   < > 11.8*   HEMATOCRIT % 34.4   < > 41.5   PLATELETS 10*3/mm3 125*   < > 151   GLUCOSE mg/dL 111*   < > 163*   CREATININE mg/dL 1.32*   < > 1.84*   BUN mg/dL 37*   < > 44*   SODIUM mmol/L 138   < > 140   POTASSIUM mmol/L 4.2   < > 4.2   AST (SGOT) U/L  --   --  19   ALT (SGPT) U/L  --   --  10   ALK PHOS U/L  --   --  109   BILIRUBIN mg/dL  --   --  0.3   ANION GAP mmol/L 7.1   < > 15.0    < > = values in this interval not displayed.       No radiology results for the last day      I have reviewed patient labs and imaging     Assessment/Plan:     Active and Resolved Problems  LEXUS  -Creatinine 1.84 (baseline 1.19),  monitor  -continue IVF  -Avoid nephrotoxic medication and contrast  -Avoid hypotension  -Hold home Chlorthalidone, Metformin, Irbesartan     Leukocytosis  UTI  History of CLL  -WBC 34.48, monitor  -UA showed positive nitrites, moderate leukocytes, 21-50 WBC, 4+ bacteria  -Urine culture - mixed franci  -Blood cultures - NGTD  -Rocephin given in ED, continue  -Hematology recommendations appreciated      History of DVT  -Home Eliquis resumed     Essential Hypertension  -Controlled  -Monitor BP  -Continue home amlodipine    VTE Prophylaxis:  Pharmacologic VTE prophylaxis orders are present.             Disposition Planning:     Barriers to Discharge:medical clearance   Anticipated Date of Discharge: 03/14  Place of Discharge: home      Time: 40 minutes     Code Status and Medical Interventions: CPR (Attempt to Resuscitate); Full Support   Ordered at: 03/12/25 0351     Code Status (Patient has no pulse and is not breathing):    CPR (Attempt to Resuscitate)     Medical Interventions (Patient has pulse or is breathing):    Full Support       Signature: Electronically signed by Silva Whatley MD, 03/13/25, 13:41 EDT.  [facility] Hospitalist Team

## 2025-03-13 NOTE — PLAN OF CARE
Goal Outcome Evaluation:              Outcome Evaluation: Pt is currently resting with daughter at bedside. Call light is within reach. No concerns at this time.

## 2025-03-13 NOTE — PLAN OF CARE
Goal Outcome Evaluation:  Plan of Care Reviewed With: patient, daughter        Progress: no change  Outcome Evaluation: Pt is an 83 y/o F admitted to [facility] 3/11/25 with fatigue and leukocytosis, was seen by PCP for wellness visit and advised to come to ED d/t hx CLL. Pt found to have UTI and LEXUS. Pt and spouse (who has dementia) recently moved in with daughter for increased assist, SSH with no SAMY, 1 step down into bathroom. Pt typically (I) with ADLs, (I) with mobility with occasional use of RW/rollator. DME includes hospital bed, RW, wc, and BSC. Pt cleared for OT by nursing, oriented x4 on RA sitting up in chair upon arrival. Pt c/o no pain, requesting to get back to bed upon arrival. Pt comes to standing with supervision, ambulates within room with SBA and toilets with supervision. Pt demo fair activity tolerance and appears to be functioning near baseline. Pt has strong support system with family who anticipate to assist at dc. Pt likely to dc home safe with family assist, no further OT needs within acute setting, will complete orders.    Anticipated Discharge Disposition (OT): home with assist

## 2025-03-13 NOTE — CASE MANAGEMENT/SOCIAL WORK
Discharge Planning Assessment   [place]     Patient Name: Diane Guan  MRN: [id number]  Today's Date: 3/13/2025    Admit Date: 3/11/2025    Plan: Return to daughter's home. Declining HHC and OPPT services. Current with [facility]   Discharge Needs Assessment       Row Name 03/13/25 1534       Living Environment    People in Home child(marco), adult    Name(s) of People in Home lives with daughter Madina    Current Living Arrangements home    Potentially Unsafe Housing Conditions none    In the past 12 months has the electric, gas, oil, or water company threatened to shut off services in your home? No    Primary Care Provided by self    Provides Primary Care For no one    Family Caregiver if Needed child(marco), adult    Family Caregiver Names lives with daughter Madina    Quality of Family Relationships helpful;involved;supportive    Able to Return to Prior Arrangements yes       Resource/Environmental Concerns    Resource/Environmental Concerns none    Transportation Concerns none       Transportation Needs    In the past 12 months, has lack of transportation kept you from medical appointments or from getting medications? no    In the past 12 months, has lack of transportation kept you from meetings, work, or from getting things needed for daily living? No       Food Insecurity    Within the past 12 months, you worried that your food would run out before you got the money to buy more. Never true    Within the past 12 months, the food you bought just didn't last and you didn't have money to get more. Never true       Transition Planning    Patient/Family Anticipates Transition to home with family    Patient/Family Anticipated Services at Transition none    Transportation Anticipated family or friend will provide       Discharge Needs Assessment    Readmission Within the Last 30 Days no previous admission in last 30 days    Equipment Currently Used at Home walker, standard;rollator;oxygen  [facility] [place]     Concerns to be Addressed no discharge needs identified;denies needs/concerns at this time;patient refuses services    Anticipated Changes Related to Illness none    Equipment Needed After Discharge none                   Discharge Plan       Row Name 03/13/25 1534       Plan    Plan Return to daughter's home. Declining HHC and OPPT services. Current with [facility]    Patient/Family in Agreement with Plan yes    Plan Comments CM met with pt at bedside to discuss discharge needs. Pt lives at daughter Madina's home, does not drive and is IADLs. PCP and pharmacy verified- pt declined MTB. No issues stated affording food/ medications or transport, and home environment is safe. Current DME: walker, rollator and home [facility] ([facility] No current HHC and pt declining at discharge. Family will provide transportation home.                  Demographic Summary       Row Name 03/13/25 1838       General Information    Admission Type inpatient    Arrived From emergency department    Required Notices Provided Important Message from Medicare    Referral Source admission list    Reason for Consult discharge planning    Preferred Language English       Contact Information    Permission Granted to Share Info With                    Functional Status       Row Name 03/13/25 1537       Functional Status    Usual Activity Tolerance moderate    Current Activity Tolerance fair       Functional Status, IADL    Medications independent    Meal Preparation independent;assistive person    Housekeeping independent;assistive person    Laundry independent;assistive person    Shopping assistive equipment and person       Mental Status    General Appearance WDL WDL       Mental Status Summary    Recent Changes in Mental Status/Cognitive Functioning no changes       Employment/    Current or Previous Occupation not applicable                  Patient Forms       Row Name 03/13/25 1511       Patient Forms    Important  Message from Medicare ([facility]) Delivered  IMM 3/13 per CM    Delivered to Patient    Method of delivery In person                      Polly Estrada RN      Office phone: [phone]  Office fax: [phone]

## 2025-03-13 NOTE — PROGRESS NOTES
Hematology/Oncology Inpatient Progress Note    PATIENT NAME: Diane Guan  : 1940  MRN: [id number]    CHIEF COMPLAINT: Fatigue    HISTORY OF PRESENT ILLNESS:    Diane Guan is a 84 y.o. female who presented to [facility] on 3/11/2025 with complaints of fatigue and leukocytosis.  Past medical history of CLL, CKD, DM, hyperlipidemia, hypertension.  Patient reported she had been having an ongoing fatigue for 2 weeks.  She had an appointment with her PCP and her WBC was 38.6 so her PCP instructed her to come to the ED for evaluation due to her history of CLL.  In the ED her WBC was 34.48, creatinine 1.84 otherwise labs were unremarkable.  UA was positive for nitrites, moderate leukocytes, 4+ bacteria.  She was given IV Rocephin in the ED and the ED provider consulted hematology.  She was admitted for further treatment of her UTI.     25  Hematology/Oncology was consulted for leukocytosis in a patient with a history of CLL and current UTI.  Patient is known to us and has been seen previously by Dr. 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  Oncological history per the chart as follows:     Chronic lymphocytic leukemia clinical stage 0 diagnosed in  on peripheral blood flow cytometry.  Was ordered due to persistent leukocytosis with differential lymphocytosis patient had CT scans which do not show any significant lymphadenopathy at that time and therefore she was observed.  Follow-up CT scans completed in 2022 showed no evidence of progressive lymphadenopathy.  Also clinical stage II right breast cancer status post right mastectomy treated with Arimidex that was initiated in .  Patient has been lost to follow-up in our office since 9/15/2023.  She was seen during a previous hospitalization  "in November 2023.     Patient has discontinued AI therapy. She states she is beginning to feel better since admission    Subjective     Patient feels better overnight    ROS:  Review of Systems   Constitutional:  Positive for fatigue.   Neurological:  Positive for weakness.        MEDICATIONS:    Scheduled Meds:  amLODIPine, 10 mg, Oral, Daily  apixaban, 5 mg, Oral, BID  cefTRIAXone, 2,000 mg, Intravenous, Daily  [Held by provider] chlorthalidone, 25 mg, Oral, Daily  docusate sodium, 100 mg, Oral, BID  insulin lispro, 2-9 Units, Subcutaneous, 4x Daily AC & at Bedtime  levothyroxine, 100 mcg, Oral, Q AM  mirtazapine, 15 mg, Oral, Nightly  pantoprazole, 40 mg, Oral, Q AM  rOPINIRole, 0.25 mg, Oral, Nightly  rosuvastatin, 10 mg, Oral, Nightly  sertraline, 150 mg, Oral, Daily  sodium chloride, 10 mL, Intravenous, Q12H  sodium chloride, 10 mL, Intravenous, Q12H  sodium chloride, 10 mL, Intravenous, Q12H       Continuous Infusions:      PRN Meds:    acetaminophen **OR** acetaminophen **OR** acetaminophen    senna-docusate sodium **AND** polyethylene glycol **AND** bisacodyl **AND** bisacodyl    dextrose    dextrose    glucagon (human recombinant)    melatonin    ondansetron    sodium chloride    sodium chloride    sodium chloride    sodium chloride    sodium chloride    sodium chloride    sodium chloride     ALLERGIES:  No Known Allergies    Objective    VITALS:   /67 (BP Location: Left arm, Patient Position: Lying)   Pulse 71   Temp 98.1 °F (36.7 °C) (Oral)   Resp 12   Ht 160 cm (63\")   Wt 77.7 kg (171 lb 4.8 oz)   SpO2 94%   BMI 30.34 kg/m²     PHYSICAL EXAM: (performed by MD)  Physical Exam  Vitals and nursing note reviewed.   Constitutional:       General: She is not in acute distress.     Appearance: She is not diaphoretic.   HENT:      Head: Normocephalic and atraumatic.   Eyes:      General: No scleral icterus.        Right eye: No discharge.         Left eye: No discharge.      Conjunctiva/sclera: " Conjunctivae normal.   Neck:      Thyroid: No thyromegaly.   Cardiovascular:      Rate and Rhythm: Normal rate and regular rhythm.      Heart sounds: Normal heart sounds.      No friction rub. No gallop.   Pulmonary:      Effort: Pulmonary effort is normal. No respiratory distress.      Breath sounds: No stridor. No wheezing.   Abdominal:      General: Bowel sounds are normal.      Palpations: Abdomen is soft. There is no mass.      Tenderness: There is no abdominal tenderness. There is no guarding or rebound.   Musculoskeletal:         General: No tenderness. Normal range of motion.      Cervical back: Normal range of motion and neck supple.   Lymphadenopathy:      Cervical: No cervical adenopathy.   Skin:     General: Skin is warm.      Findings: No erythema or rash.   Neurological:      Mental Status: She is alert and oriented to person, place, and time.      Motor: No abnormal muscle tone.   Psychiatric:         Behavior: Behavior normal.           RECENT LABS:  Lab Results (last 24 hours)       Procedure Component Value Units Date/Time    Pathology Consultation [[id number]] Collected: 03/12/25 0948    Specimen: Blood, Venous Line Updated: 03/13/25 0658    CBC & Differential [[id number]]  (Abnormal) Collected: 03/13/25 0426    Specimen: Blood from Arm, Left Updated: 03/13/25 0613    Narrative:      The following orders were created for panel order CBC & Differential.  Procedure                               Abnormality         Status                     ---------                               -----------         ------                     CBC Auto Differential[[id number]]        Abnormal            Final result               Scan Slide[[id number]]                                       Final result                 Please view results for these tests on the individual orders.    Scan Slide [[id number]] Collected: 03/13/25 0426    Specimen: Blood from Arm, Left Updated: 03/13/25 0613     Scan Slide --     Comment: See  Manual Differential Results       Manual Differential [[id number]]  (Abnormal) Collected: 03/13/25 0426    Specimen: Blood from Arm, Left Updated: 03/13/25 0613     Neutrophil % 29.0 %      Lymphocyte % 61.0 %      Monocyte % 4.0 %      Eosinophil % 2.0 %      Basophil % 2.0 %      Bands %  1.0 %      Atypical Lymphocyte % 1.0 %      Neutrophils Absolute 8.18 10*3/mm3      Lymphocytes Absolute 16.90 10*3/mm3      Monocytes Absolute 1.09 10*3/mm3      Eosinophils Absolute 0.55 10*3/mm3      Basophils Absolute 0.55 10*3/mm3      Poikilocytes Slight/1+     WBC Morphology Normal     Platelet Estimate Adequate     Large Platelets Slight/1+    Narrative:      Reviewed by Pathologist within the past 30 days on 03/12/2025 .     CBC Auto Differential [[id number]]  (Abnormal) Collected: 03/13/25 0426    Specimen: Blood from Arm, Left Updated: 03/13/25 0613     WBC 27.26 10*3/mm3      RBC 3.67 10*6/mm3      Hemoglobin 10.0 g/dL      Hematocrit 34.4 %      MCV 93.7 fL      MCH 27.2 pg      MCHC 29.1 g/dL      RDW 16.0 %      RDW-SD 55.7 fl      MPV 12.8 fL      Platelets 125 10*3/mm3     Narrative:      The previously reported component NRBC is no longer being reported. Previous result was 0.0 /100 WBC (Reference Range: 0.0-0.2 /100 WBC) on 3/13/2025 at 0509 EDT.    Basic Metabolic Panel [[id number]]  (Abnormal) Collected: 03/13/25 0426    Specimen: Blood from Arm, Left Updated: 03/13/25 0504     Glucose 111 mg/dL      BUN 37 mg/dL      Creatinine 1.32 mg/dL      Sodium 138 mmol/L      Potassium 4.2 mmol/L      Chloride 110 mmol/L      CO2 20.9 mmol/L      Calcium 8.7 mg/dL      BUN/Creatinine Ratio 28.0     Anion Gap 7.1 mmol/L      eGFR 40.1 mL/min/1.73     Narrative:      GFR Categories in Chronic Kidney Disease (CKD)      GFR Category          GFR (mL/min/1.73)    Interpretation  G1                     90 or greater         Normal or high (1)  G2                      60-89                Mild decrease (1)  G3a                    45-59                Mild to moderate decrease  G3b                   30-44                Moderate to severe decrease  G4                    15-29                Severe decrease  G5                    14 or less           Kidney failure          (1)In the absence of evidence of kidney disease, neither GFR category G1 or G2 fulfill the criteria for CKD.    eGFR calculation 2021 CKD-EPI creatinine equation, which does not include race as a factor    Blood Culture - Blood, Hand, Left [[id number]]  (Normal) Collected: 03/12/25 0303    Specimen: Blood from Hand, Left Updated: 03/13/25 0315     Blood Culture No growth at 24 hours    Blood Culture - Blood, Arm, Left [[id number]]  (Normal) Collected: 03/12/25 0304    Specimen: Blood from Arm, Left Updated: 03/13/25 0315     Blood Culture No growth at 24 hours    POC Glucose Once [[id number]]  (Abnormal) Collected: 03/12/25 2131    Specimen: Blood Updated: 03/12/25 2133     Glucose 134 mg/dL      Comment: Serial Number: [id number]:  [id number]       POC Glucose 4x Daily Before Meals & at Bedtime [[id number]]  (Abnormal) Collected: 03/12/25 1711    Specimen: Blood Updated: 03/12/25 1713     Glucose 125 mg/dL      Comment: Serial Number: [id number]:  [id number]       POC Glucose 4x Daily Before Meals & at Bedtime [[id number]]  (Abnormal) Collected: 03/12/25 1133    Specimen: Blood Updated: 03/12/25 1136     Glucose 136 mg/dL      Comment: Serial Number: [id number]:  [id number]       Basic Metabolic Panel [[id number]]  (Abnormal) Collected: 03/12/25 0948    Specimen: Blood from Arm, Left Updated: 03/12/25 1036     Glucose 131 mg/dL      BUN 44 mg/dL      Creatinine 1.66 mg/dL      Sodium 139 mmol/L      Potassium 4.6 mmol/L      Comment: Specimen hemolyzed.  Result may be falsely elevated.        Chloride 110 mmol/L      CO2 14.5 mmol/L      Calcium 8.5 mg/dL      BUN/Creatinine Ratio 26.5     Anion Gap 14.5 mmol/L      eGFR 30.5 mL/min/1.73     Narrative:       GFR Categories in Chronic Kidney Disease (CKD)      GFR Category          GFR (mL/min/1.73)    Interpretation  G1                     90 or greater         Normal or high (1)  G2                      60-89                Mild decrease (1)  G3a                   45-59                Mild to moderate decrease  G3b                   30-44                Moderate to severe decrease  G4                    15-29                Severe decrease  G5                    14 or less           Kidney failure          (1)In the absence of evidence of kidney disease, neither GFR category G1 or G2 fulfill the criteria for CKD.    eGFR calculation 2021 CKD-EPI creatinine equation, which does not include race as a factor    CBC & Differential [[id number]]  (Abnormal) Collected: 03/12/25 0948    Specimen: Blood Updated: 03/12/25 1014    Narrative:      The following orders were created for panel order CBC & Differential.  Procedure                               Abnormality         Status                     ---------                               -----------         ------                     CBC Auto Differential[[id number]]        Abnormal            Final result               Scan Slide[[id number]]                                       Final result                 Please view results for these tests on the individual orders.    CBC Auto Differential [[id number]]  (Abnormal) Collected: 03/12/25 0948    Specimen: Blood Updated: 03/12/25 1014     WBC 30.20 10*3/mm3      RBC 4.12 10*6/mm3      Hemoglobin 11.3 g/dL      Hematocrit 41.8 %      .5 fL      MCH 27.4 pg      MCHC 27.0 g/dL      RDW 16.6 %      RDW-SD 62.0 fl      MPV 12.3 fL      Platelets 129 10*3/mm3     Narrative:      The previously reported component NRBC is no longer being reported. Previous result was 0.0 /100 WBC (Reference Range: 0.0-0.2 /100 WBC) on 3/12/2025 at 0957 EDT.    Scan Slide [[id number]] Collected: 03/12/25 0948    Specimen: Blood Updated: 03/12/25 1014      Scan Slide --     Comment: See Manual Differential Results       Manual Differential [[id number]]  (Abnormal) Collected: 03/12/25 0948    Specimen: Blood Updated: 03/12/25 1014     Neutrophil % 15.0 %      Lymphocyte % 83.0 %      Eosinophil % 2.0 %      Neutrophils Absolute 4.53 10*3/mm3      Lymphocytes Absolute 25.07 10*3/mm3      Eosinophils Absolute 0.60 10*3/mm3      Anisocytosis Slight/1+     Macrocytes Slight/1+     Poikilocytes Slight/1+     WBC Morphology Normal     Platelet Morphology Normal    Path Consult Reflex [[id number]] Collected: 03/12/25 0948    Specimen: Blood Updated: 03/12/25 1014     Pathology Review Yes    POC Glucose Once [[id number]]  (Abnormal) Collected: 03/12/25 0745    Specimen: Blood Updated: 03/12/25 0746     Glucose 119 mg/dL      Comment: Serial Number: [id number]:  [id number]               PENDING RESULTS:     IMAGING REVIEWED:  No radiology results for the last day    Assessment & Plan   ASSESSMENT:    Chronic lymphocytic leukemia: First diagnosed in 2017.  Has not required any treatment since her diagnosis.  WBC currently 30.20 down from 38.96 with the initiation of IV antibiotics for treatment of her UTI.  Baseline WBC over the past months 20-30,000.  Absolute lymphocytes 25.07.  Continue observation  History of stage II right breast cancer status post right mastectomy.  ER positive, [place] positive, HER2/juan luis negative.  Previously on Arimidex that was started in 2019.  Patient self discontinued Arimidex   UTI: Urine culture and blood cultures pending.  Patient received IV Rocephin in the ED.  History of pulmonary emboli/acute left lower extremity DVT: Diagnosed in July 2024.  On Eliquis.  Mild macrocytic anemia: Hemoglobin 11.3 g/dL, .5.  Continue to monitor.     PLAN  Continue to treat acute infection UTI  Daily CBCs  Will continue to follow       Electronically signed by Ling Bolden MD, 03/13/25, 5:59 PM EDT.                        Principal Discharge DX:	Pulmonary embolism   1

## 2025-03-13 NOTE — THERAPY EVALUATION
Patient Name: Diane Guan  : 1940    MRN: [id number]                              Today's Date: 3/13/2025       Admit Date: 3/11/2025    Visit Dx:     ICD-10-CM ICD-9-CM   1. Acute UTI  N39.0 599.0   2. LEXUS (acute kidney injury)  N17.9 584.9   3. 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    Acquired hypothyroidism    Type 2 diabetes mellitus with stage 3b chronic kidney disease, without long-term current use of insulin    Insomnia    Postmenopausal status    Shoulder pain    Overactive bladder    Vitamin B 12 deficiency    Seasonal allergies    Absolute anemia    Vitamin D deficiency    LIBBY (obstructive sleep apnea)    Nocturnal hypoxia    Acute respiratory failure with hypoxia and hypercapnia    Abdominal pain    COPD (chronic obstructive pulmonary disease)    CKD (chronic kidney disease) stage 3, GFR 30-59 ml/min    Sepsis due to pneumonia    Acute on chronic respiratory failure with hypoxemia    Class 1 obesity due to excess calories with serious comorbidity and body mass index (BMI) of 32.0 to 32.9 in adult    Acute hypoxemic respiratory failure    History of breast cancer    Personal history of CLL (chronic lymphocytic leukemia)    History of cigarette smoking    Fall at home    Rhinovirus infection    Acute respiratory failure with hypoxia    Hyperkalemia    Respiratory failure    Leukocytosis     Past Medical History:   Diagnosis Date    Acute bronchitis due to human metapneumovirus 2020    Anxiety disorder     Arthritis     Arthritis of back     Breast cancer 2019    Invasive ductal carcinoma    Chronic lymphocytic  leukemia (CLL), B-cell 01/2019    Depression     Disease of thyroid gland     Diverticulosis of colon 2018    Identified on colonoscopy    DVT (deep venous thrombosis)     Dysphagia 12/2020    Dyspnea on exertion     Fall at home 10/11/2023    Hemorrhoid     Hiatal hernia 12/2020    Hiatal hernia with GERD     large hiatal hernia with high-grade reflux on barium swallow; s/p laparoscopic fundoplication with gastropexy    History of breast cancer 10/11/2023    History of cigarette smoking 10/11/2023    History of diabetes mellitus     no meds now    Hyperlipidemia     Hypertension     Knee swelling     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    aspiration pneumonia; completed abx therapy    OAB (overactive bladder)     Personal history of CLL (chronic lymphocytic leukemia) 10/11/2023    Pulmonary emboli     Skin cancer     Sleep apnea     daughter states pt does not have and doesn't have machine     Past Surgical History:   Procedure Laterality Date    BLADDER SURGERY      BREAST BIOPSY      BRONCHOSCOPY N/A 09/13/2019    Procedure: BRONCHOSCOPY with bronchial washing;  Surgeon: Marnie Frankel MD;  Location: [facility] ENDOSCOPY;  Service: Pulmonary    BRONCHOSCOPY Bilateral 10/18/2023    Procedure: BRONCHOSCOPY AT BEDSIDE;  Surgeon: Vinicius Heredia DO;  Location: [facility] ENDOSCOPY;  Service: Pulmonary;  Laterality: Bilateral;    COLONOSCOPY  07/19/2018    severe diverticulosis    CYST REMOVAL      Removed a fatty cyst off of her back    ENDOSCOPY N/A 11/23/2020    Procedure: ESOPHAGOGASTRODUODENOSCOPY with dilatation (Bougie # 48, 50, 52, 54, 56, 58);  Surgeon: Den Plummer DO;  Location: [facility] ENDOSCOPY;  Service: General;  Laterality: N/A;  Post: large hiatal hernia, joshua's ulcers    HIATAL HERNIA REPAIR N/A 12/30/2020    Procedure: Laparoscopic hiatal hernia repair with gastropexy;  Surgeon: Den Plummer DO;  Location: [facility] MAIN OR;  Service: General;  Laterality: N/A;    MASTECTOMY Right 02/04/2019     Invasive ductal carcinoma    TUBAL ABDOMINAL LIGATION        General Information       Row Name 03/13/25 1524          OT Time and Intention    Subjective Information no complaints  -MS     Document Type evaluation  -MS     Mode of Treatment occupational therapy  -MS     Patient Effort excellent  -MS       Row Name 03/13/25 1524          General Information    Patient Profile Reviewed yes  -MS     Prior Level of Function independent:;ADL's;all household mobility  -MS     Existing Precautions/Restrictions no known precautions/restrictions  -MS     Barriers to Rehab medically complex  -MS       Row Name 03/13/25 1524          Occupational Profile    Reason for Services/Referral (Occupational Profile) Pt is an 83 y/o F admitted to [facility] 3/11/25 with fatigue and leukocytosis, was seen by PCP for wellness visit and advised to come to ED d/t hx CLL. Pt found to have UTI and LEXUS. PMHx significant for mixed anxiety and depressive disorder, hx breast cancer s/p R mastectomy, hx CLL, hx DVT/PE, DMII, CKD and hx falls. Pt and spouse (who has dementia) recently moved in with daughter for increased assist, SSH with no SAMY, 1 step down into bathroom. Pt typically (I) with ADLs, (I) with mobility with occasional use of RW/rollator. DME includes hospital bed, RW, wc, and BSC.  -MS     Environmental Supports and Barriers (Occupational Profile) supportive family  -MS       Row Name 03/13/25 1524          Living Environment    Current Living Arrangements home  -MS     People in Home child(marco), adult;spouse  -MS       Row Name 03/13/25 1524          Home Main Entrance    Number of Stairs, Main Entrance none  -MS       Row Name 03/13/25 1524          Stairs Within Home, Primary    Number of Stairs, Within Home, Primary one  -MS       Row Name 03/13/25 1524          Cognition    Orientation Status (Cognition) oriented x 4  -MS       Row Name 03/13/25 1524          Safety Issues/Impairments Affecting Functional Mobility    Impairments  Affecting Function (Mobility) balance;endurance/activity tolerance  -MS               User Key  (r) = Recorded By, (t) = Taken By, (c) = Cosigned By      Initials Name Provider Type    Sheree Lopez OT Occupational Therapist                     Mobility/ADL's       Row Name 03/13/25 1525          Bed Mobility    Bed Mobility supine-sit  -MS     Supine-Sit [place] (Bed Mobility) contact guard  -MS     Assistive Device (Bed Mobility) head of bed elevated  -MS       Row Name 03/13/25 1525          Transfers    Transfers sit-stand transfer;toilet transfer  -MS       Row Name 03/13/25 1525          Sit-Stand Transfer    Sit-Stand [place] (Transfers) supervision  -MS       Row Name 03/13/25 1525          Toilet Transfer    [place] Level (Toilet Transfer) supervision  -MS     Assistive Device (Toilet Transfer) commode  -MS       Row Name 03/13/25 1525          Functional Mobility    Patient was able to Ambulate yes  -MS       Row Name 03/13/25 1525          Activities of Daily Living    BADL Assessment/Intervention lower body dressing;toileting  -MS       Row Name 03/13/25 1525          Lower Body Dressing Assessment/Training    [place] Level (Lower Body Dressing) don;doff;socks;modified independence  -MS     Position (Lower Body Dressing) edge of bed sitting  -MS       Row Name 03/13/25 1525          Toileting Assessment/Training    [place] Level (Toileting) adjust/manage clothing;toileting skills;supervision  -MS     Assistive Devices (Toileting) commode  -MS     Position (Toileting) unsupported sitting  -MS               User Key  (r) = Recorded By, (t) = Taken By, (c) = Cosigned By      Initials Name Provider Type    Sheree Lopez OT Occupational Therapist                   Obj/Interventions       Row Name 03/13/25 1526          Sensory Assessment (Somatosensory)    Sensory Assessment (Somatosensory) sensation intact  -MS       Row Name 03/13/25 1526          Vision  Assessment/Intervention    Visual Impairment/Limitations WNL  -MS       Row Name 03/13/25 1526          Range of Motion Comprehensive    General Range of Motion no range of motion deficits identified  -MS       Row Name 03/13/25 1526          Strength Comprehensive (MMT)    General Manual Muscle Testing (MMT) Assessment no strength deficits identified  -MS       Row Name 03/13/25 1526          Balance    Balance Assessment sitting static balance;sitting dynamic balance;standing static balance;standing dynamic balance  -MS     Static Sitting Balance modified independence  -MS     Dynamic Sitting Balance modified independence  -MS     Position, Sitting Balance unsupported  -MS     Static Standing Balance supervision  -MS     Dynamic Standing Balance supervision  -MS     Position/Device Used, Standing Balance unsupported  -MS               User Key  (r) = Recorded By, (t) = Taken By, (c) = Cosigned By      Initials Name Provider Type    Sheree Lopez, ARLET Occupational Therapist                   Goals/Plan    No documentation.                  Clinical Impression       Row Name 03/13/25 1526          Pain Assessment    Pretreatment Pain Rating 0/10 - no pain  -MS     Posttreatment Pain Rating 0/10 - no pain  -MS       Row Name 03/13/25 1526          Plan of Care Review    Plan of Care Reviewed With patient;daughter  -MS     Progress no change  -MS     Outcome Evaluation Pt is an 83 y/o F admitted to [facility] 3/11/25 with fatigue and leukocytosis, was seen by PCP for wellness visit and advised to come to ED d/t hx CLL. Pt found to have UTI and LEXUS. Pt and spouse (who has dementia) recently moved in with daughter for increased assist, SSH with no SAMY, 1 step down into bathroom. Pt typically (I) with ADLs, (I) with mobility with occasional use of RW/rollator. DME includes hospital bed, RW, wc, and BSC. Pt cleared for OT by nursing, oriented x4 on RA sitting up in chair upon arrival. Pt c/o no pain, requesting to get back to  bed upon arrival. Pt comes to standing with supervision, ambulates within room with SBA and toilets with supervision. Pt demo fair activity tolerance and appears to be functioning near baseline. Pt has strong support system with family who anticipate to assist at dc. Pt likely to dc home safe with family assist, no further OT needs within acute setting, will complete orders.  -MS       Row Name 03/13/25 1526          Therapy Assessment/Plan (OT)    Criteria for Skilled Therapeutic Interventions Met (OT) no;does not meet criteria for skilled intervention  -MS     Therapy Frequency (OT) evaluation only  -MS       Row Name 03/13/25 1526          Therapy Plan Review/Discharge Plan (OT)    Anticipated Discharge Disposition (OT) home with assist  -MS       Row Name 03/13/25 1526          Vital Signs    O2 Delivery Pre Treatment room air  -MS     O2 Delivery Intra Treatment room air  -MS     O2 Delivery Post Treatment room air  -MS     Pre Patient Position Sitting  -MS     Intra Patient Position Standing  -MS     Post Patient Position Supine  -MS       Row Name 03/13/25 1526          Positioning and Restraints    Pre-Treatment Position sitting in chair/recliner  -MS     Post Treatment Position bed  -MS     In Bed notified nsg;supine;call light within reach;encouraged to call for assist;with family/caregiver  -MS               User Key  (r) = Recorded By, (t) = Taken By, (c) = Cosigned By      Initials Name Provider Type    Sheree Lopez, ARLET Occupational Therapist                   Outcome Measures       Row Name 03/13/25 1532          How much help from another is currently needed...    Putting on and taking off regular lower body clothing? 3  -MS     Bathing (including washing, rinsing, and drying) 4  -MS     Toileting (which includes using toilet bed pan or urinal) 4  -MS     Putting on and taking off regular upper body clothing 4  -MS     Taking care of personal grooming (such as brushing teeth) 4  -MS     Eating  meals 4  -MS     AM-PAC 6 Clicks Score (OT) 23  -MS       Row Name 03/13/25 0859          How much help from another person do you currently need...    Turning from your back to your side while in flat bed without using bedrails? 3  -SR     Moving from lying on back to sitting on the side of a flat bed without bedrails? 3  -SR     Moving to and from a bed to a chair (including a wheelchair)? 3  -SR     Standing up from a chair using your arms (e.g., wheelchair, bedside chair)? 3  -SR     Climbing 3-5 steps with a railing? 3  -SR     To walk in hospital room? 3  -SR     AM-PAC 6 Clicks Score (PT) 18  -SR     Highest Level of Mobility Goal 6 --> Walk 10 steps or more  -SR       Row Name 03/13/25 1532          Functional Assessment    Outcome Measure Options AM-PAC 6 Clicks Daily Activity (OT)  -MS               User Key  (r) = Recorded By, (t) = Taken By, (c) = Cosigned By      Initials Name Provider Type    Sheree Lopez OT Occupational Therapist    SR Codi Montenegro RN Registered Nurse                    Occupational Therapy Education       Title: PT OT SLP Therapies (Done)       Topic: Occupational Therapy (Done)       Point: ADL training (Done)       Learning Progress Summary            Patient Acceptance, E,TB, VU by MS at 3/13/2025 1532                      Point: Precautions (Done)       Learning Progress Summary            Patient Acceptance, E,TB, VU by MS at 3/13/2025 1532                      Point: Body mechanics (Done)       Learning Progress Summary            Patient Acceptance, E,TB, VU by MS at 3/13/2025 1532                                      User Key       Initials Effective Dates Name Provider Type Discipline    MS 07/13/22 -  Sheree Whitaker OT Occupational Therapist OT                  OT Recommendation and Plan  Therapy Frequency (OT): evaluation only  Plan of Care Review  Plan of Care Reviewed With: patient, daughter  Progress: no change  Outcome Evaluation: Pt is an 85 y/o F  admitted to [facility] 3/11/25 with fatigue and leukocytosis, was seen by PCP for wellness visit and advised to come to ED d/t hx CLL. Pt found to have UTI and LEXUS. Pt and spouse (who has dementia) recently moved in with daughter for increased assist, SSH with no SAMY, 1 step down into bathroom. Pt typically (I) with ADLs, (I) with mobility with occasional use of RW/rollator. DME includes hospital bed, RW, wc, and BSC. Pt cleared for OT by nursing, oriented x4 on RA sitting up in chair upon arrival. Pt c/o no pain, requesting to get back to bed upon arrival. Pt comes to standing with supervision, ambulates within room with SBA and toilets with supervision. Pt demo fair activity tolerance and appears to be functioning near baseline. Pt has strong support system with family who anticipate to assist at dc. Pt likely to dc home safe with family assist, no further OT needs within acute setting, will complete orders.     Time Calculation:         Time Calculation- OT       Row Name 03/13/25 1533             Time Calculation- OT    OT Start Time 1406  -MS      OT Stop Time 1424  -MS      OT Time Calculation (min) 18 min  -MS      Total Timed Code Minutes- OT 0 minute(s)  -MS      OT Received On 03/13/25  -MS                User Key  (r) = Recorded By, (t) = Taken By, (c) = Cosigned By      Initials Name Provider Type    Sheree Lopez OT Occupational Therapist                  Therapy Charges for Today       Code Description Service Date Service Provider Modifiers Qty    [id number] HC OT EVAL MOD COMPLEXITY 4 3/13/2025 Sheree Whitaker OT GO 1                 Sheree Whitaker OT  3/13/2025

## 2025-03-14 ENCOUNTER — READMISSION MANAGEMENT (OUTPATIENT)
Dept: CALL CENTER | Facility: HOSPITAL | Age: 85
End: 2025-03-14
Payer: MEDICARE

## 2025-03-14 ENCOUNTER — TELEPHONE (OUTPATIENT)
Dept: FAMILY MEDICINE CLINIC | Facility: CLINIC | Age: 85
End: 2025-03-14
Payer: MEDICARE

## 2025-03-14 VITALS
HEIGHT: 63 IN | TEMPERATURE: 98.1 F | HEART RATE: 66 BPM | DIASTOLIC BLOOD PRESSURE: 73 MMHG | WEIGHT: 171.3 LBS | RESPIRATION RATE: 16 BRPM | SYSTOLIC BLOOD PRESSURE: 155 MMHG | OXYGEN SATURATION: 92 % | BODY MASS INDEX: 30.35 KG/M2

## 2025-03-14 LAB
ACANTHOCYTES BLD QL SMEAR: ABNORMAL
ANION GAP SERPL CALCULATED.3IONS-SCNC: 8.8 MMOL/L (ref 5–15)
BLASTS NFR BLD MANUAL: 4 % (ref 0–0)
BUN SERPL-MCNC: 26 MG/DL (ref 8–23)
BUN/CREAT SERPL: 21.8 (ref 7–25)
BURR CELLS BLD QL SMEAR: ABNORMAL
CALCIUM SPEC-SCNC: 8.2 MG/DL (ref 8.6–10.5)
CHLORIDE SERPL-SCNC: 112 MMOL/L (ref 98–107)
CO2 SERPL-SCNC: 18.2 MMOL/L (ref 22–29)
CREAT SERPL-MCNC: 1.19 MG/DL (ref 0.57–1)
DEPRECATED RDW RBC AUTO: 57.4 FL (ref 37–54)
EGFRCR SERPLBLD CKD-EPI 2021: 45.5 ML/MIN/1.73
EOSINOPHIL # BLD MANUAL: 0.24 10*3/MM3 (ref 0–0.4)
EOSINOPHIL NFR BLD MANUAL: 1 % (ref 0.3–6.2)
ERYTHROCYTE [DISTWIDTH] IN BLOOD BY AUTOMATED COUNT: 15.9 % (ref 12.3–15.4)
GLUCOSE BLDC GLUCOMTR-MCNC: 124 MG/DL (ref 70–105)
GLUCOSE BLDC GLUCOMTR-MCNC: 127 MG/DL (ref 70–105)
GLUCOSE SERPL-MCNC: 137 MG/DL (ref 65–99)
HCT VFR BLD AUTO: 35.4 % (ref 34–46.6)
HGB BLD-MCNC: 10 G/DL (ref 12–15.9)
LYMPHOCYTES # BLD MANUAL: 19.42 10*3/MM3 (ref 0.7–3.1)
LYMPHOCYTES NFR BLD MANUAL: 2 % (ref 5–12)
MCH RBC QN AUTO: 27.2 PG (ref 26.6–33)
MCHC RBC AUTO-ENTMCNC: 28.2 G/DL (ref 31.5–35.7)
MCV RBC AUTO: 96.5 FL (ref 79–97)
MONOCYTES # BLD: 0.49 10*3/MM3 (ref 0.1–0.9)
NEUTROPHILS # BLD AUTO: 2.91 10*3/MM3 (ref 1.7–7)
NEUTROPHILS NFR BLD MANUAL: 12 % (ref 42.7–76)
PLATELET # BLD AUTO: 119 10*3/MM3 (ref 140–450)
PMV BLD AUTO: 12.1 FL (ref 6–12)
POTASSIUM SERPL-SCNC: 4.5 MMOL/L (ref 3.5–5.2)
PROLYMPHOCYTES NFR BLD MANUAL: 1 % (ref 0–0)
RBC # BLD AUTO: 3.67 10*6/MM3 (ref 3.77–5.28)
SCAN SLIDE: NORMAL
SMALL PLATELETS BLD QL SMEAR: ABNORMAL
SMUDGE CELLS BLD QL SMEAR: ABNORMAL
SODIUM SERPL-SCNC: 139 MMOL/L (ref 136–145)
VARIANT LYMPHS NFR BLD MANUAL: 80 % (ref 19.6–45.3)
WBC NRBC COR # BLD AUTO: 24.28 10*3/MM3 (ref 3.4–10.8)

## 2025-03-14 PROCEDURE — 85007 BL SMEAR W/DIFF WBC COUNT: CPT | Performed by: STUDENT IN AN ORGANIZED HEALTH CARE EDUCATION/TRAINING PROGRAM

## 2025-03-14 PROCEDURE — 85025 COMPLETE CBC W/AUTO DIFF WBC: CPT | Performed by: STUDENT IN AN ORGANIZED HEALTH CARE EDUCATION/TRAINING PROGRAM

## 2025-03-14 PROCEDURE — 82948 REAGENT STRIP/BLOOD GLUCOSE: CPT | Performed by: INTERNAL MEDICINE

## 2025-03-14 PROCEDURE — 25010000002 CEFTRIAXONE PER 250 MG

## 2025-03-14 PROCEDURE — 80048 BASIC METABOLIC PNL TOTAL CA: CPT | Performed by: STUDENT IN AN ORGANIZED HEALTH CARE EDUCATION/TRAINING PROGRAM

## 2025-03-14 RX ORDER — AMLODIPINE BESYLATE 10 MG/1
10 TABLET ORAL DAILY
Qty: 90 TABLET | Refills: 1 | Status: SHIPPED | OUTPATIENT
Start: 2025-03-14

## 2025-03-14 RX ADMIN — Medication 10 ML: at 08:55

## 2025-03-14 RX ADMIN — CEFTRIAXONE 2000 MG: 2 INJECTION, POWDER, FOR SOLUTION INTRAMUSCULAR; INTRAVENOUS at 08:55

## 2025-03-14 RX ADMIN — DOCUSATE SODIUM 100 MG: 100 CAPSULE, LIQUID FILLED ORAL at 08:55

## 2025-03-14 RX ADMIN — LEVOTHYROXINE SODIUM 100 MCG: 100 TABLET ORAL at 05:04

## 2025-03-14 RX ADMIN — APIXABAN 5 MG: 5 TABLET, FILM COATED ORAL at 08:55

## 2025-03-14 RX ADMIN — PANTOPRAZOLE SODIUM 40 MG: 40 TABLET, DELAYED RELEASE ORAL at 05:04

## 2025-03-14 RX ADMIN — AMLODIPINE BESYLATE 10 MG: 5 TABLET ORAL at 08:54

## 2025-03-14 RX ADMIN — SERTRALINE HYDROCHLORIDE 150 MG: 100 TABLET, FILM COATED ORAL at 08:55

## 2025-03-14 NOTE — TELEPHONE ENCOUNTER
The only cheap replacement for this is warfarin (Coumadin).  Coumadin requires frequent monitoring.  I can send it in for her and she will need to come in the office for INR testing on Monday or Tuesday.  We'll adjust if she is not in goal and I'll need to recheck an INR most likely in a week; this will continue until she gets into goal range and then she will need to come in at least every 4 weeks for INR checks.  Is she ok with this?   Coumadin also interacts with many foods and medications.  I can given her a list of foods to avoid when she comes in.

## 2025-03-14 NOTE — PLAN OF CARE
Problem: Adult Inpatient Plan of Care  Goal: Plan of Care Review  Outcome: Progressing  Flowsheets (Taken 3/14/2025 0207)  Outcome Evaluation: Pt has been resting in bed comfortably through the night. Call light is within reach. No questions or concerns at this time.  Plan of Care Reviewed With: patient  Goal: Patient-Specific Goal (Individualized)  Outcome: Progressing  Goal: Absence of Hospital-Acquired Illness or Injury  Outcome: Progressing  Intervention: Identify and Manage Fall Risk  Recent Flowsheet Documentation  Taken 3/14/2025 0000 by Shakira Wooten RNA  Safety Promotion/Fall Prevention: safety round/check completed  Taken 3/13/2025 2200 by Shakira Wooten RNA  Safety Promotion/Fall Prevention: safety round/check completed  Taken 3/13/2025 2030 by Shakira Wooten RNA  Safety Promotion/Fall Prevention: safety round/check completed  Intervention: Prevent Skin Injury  Recent Flowsheet Documentation  Taken 3/13/2025 2030 by Shakira Wooten RNA  Body Position: position changed independently  Intervention: Prevent and Manage VTE (Venous Thromboembolism) Risk  Recent Flowsheet Documentation  Taken 3/13/2025 2030 by Shakira Wooten RNA  VTE Prevention/Management:   bilateral   SCDs (sequential compression devices) off  Intervention: Prevent Infection  Recent Flowsheet Documentation  Taken 3/13/2025 2030 by Shakira Wooten RNA  Infection Prevention: single patient room provided  Goal: Optimal Comfort and Wellbeing  Outcome: Progressing  Intervention: Provide Person-Centered Care  Recent Flowsheet Documentation  Taken 3/13/2025 2030 by Shakira Wooten RNA  Trust Relationship/Rapport:   care explained   choices provided  Goal: Readiness for Transition of Care  Outcome: Progressing   Goal Outcome Evaluation:  Plan of Care Reviewed With: patient           Outcome Evaluation: Pt has been resting in bed comfortably through the night. Call light is within reach.  No questions or concerns at this time.

## 2025-03-14 NOTE — OUTREACH NOTE
Prep Survey      Flowsheet Row Responses   [facility] facility patient discharged from? [facility]   Is LACE score < 7 ? No   Eligibility [facility]   Date of Admission 03/11/25   Date of Discharge 03/14/25   Discharge Disposition Home or Self Care   Discharge diagnosis Leukocytosis   Does the patient have one of the following disease processes/diagnoses(primary or secondary)? Other   Does the patient have Home health ordered? No   Is there a DME ordered? No   Prep survey completed? Yes            MIKE ZHANG - Registered Nurse

## 2025-03-14 NOTE — PLAN OF CARE
Problem: Adult Inpatient Plan of Care  Goal: Plan of Care Review  Outcome: Met  Goal: Patient-Specific Goal (Individualized)  Outcome: Met  Goal: Absence of Hospital-Acquired Illness or Injury  Outcome: Met  Intervention: Identify and Manage Fall Risk  Recent Flowsheet Documentation  Taken 3/14/2025 1000 by Shyanne Frost RN  Safety Promotion/Fall Prevention:   fall prevention program maintained   safety round/check completed  Taken 3/14/2025 0850 by Shyanne Frost RN  Safety Promotion/Fall Prevention:   fall prevention program maintained   safety round/check completed  Intervention: Prevent Skin Injury  Recent Flowsheet Documentation  Taken 3/14/2025 0850 by Shyanne Frost RN  Body Position: position changed independently  Skin Protection: incontinence pads utilized  Intervention: Prevent and Manage VTE (Venous Thromboembolism) Risk  Recent Flowsheet Documentation  Taken 3/14/2025 0850 by Shyanne Frost RN  VTE Prevention/Management:   bilateral   SCDs (sequential compression devices) off  Intervention: Prevent Infection  Recent Flowsheet Documentation  Taken 3/14/2025 0850 by Shyanne Frost RN  Infection Prevention: single patient room provided  Goal: Optimal Comfort and Wellbeing  Outcome: Met  Intervention: Provide Person-Centered Care  Recent Flowsheet Documentation  Taken 3/14/2025 0850 by Shyanne Frost RN  Trust Relationship/Rapport:   care explained   choices provided  Goal: Readiness for Transition of Care  Outcome: Met     Problem: Breathing Pattern Ineffective  Goal: Effective Breathing Pattern  Outcome: Met  Intervention: Promote Improved Breathing Pattern  Recent Flowsheet Documentation  Taken 3/14/2025 0850 by Shyanne Frost RN  Head of Bed (HOB) Positioning: HOB elevated     Problem: Chest Pain  Goal: Resolution of Chest Pain Symptoms  Outcome: Met     Problem: Fall Injury Risk  Goal: Absence of Fall and Fall-Related Injury  Outcome: Met  Intervention: Identify and  Manage Contributors  Recent Flowsheet Documentation  Taken 3/14/2025 0850 by Shyanne Frost RN  Medication Review/Management: medications reviewed  Self-Care Promotion: independence encouraged  Intervention: Promote Injury-Free Environment  Recent Flowsheet Documentation  Taken 3/14/2025 1000 by Shyanne Frost, RN  Safety Promotion/Fall Prevention:   fall prevention program maintained   safety round/check completed  Taken 3/14/2025 0850 by Shyanne Frost RN  Safety Promotion/Fall Prevention:   fall prevention program maintained   safety round/check completed   Goal Outcome Evaluation:   Pt to discharge home with daughter today. Discharge paperwork discussed with patient and daughter. Midline removed prior to discharge. She has PRN oxygen at home. No concerns noted.

## 2025-03-14 NOTE — DISCHARGE SUMMARY
"             [facility] Medicine Services  Discharge Summary    Date of Service: 3/14/2025  Patient Name: Diane Guan  : 1940  MRN: [id number]    Date of Admission: 3/11/2025  Discharge Diagnosis: LEXUS, UTI  Date of Discharge: 3/14/2025  Primary Care Physician: Asia Queen APRN      Presenting Problem:   Leukocytosis [D72.829]  CLL (chronic lymphocytic leukemia) [C91.10]  Acute UTI [N39.0]  LEXUS (acute kidney injury) [N17.9]    Active and Resolved Hospital Problems:  Active Hospital Problems    Diagnosis POA    Leukocytosis [D72.829] Yes      Resolved Hospital Problems   No resolved problems to display.         Hospital Course     HPI:    \"Diane Guan is a 84 y.o. female with a previous medical history of CLL, CKD stage 3B, DM, HLD, HTN, Hypothyroidism who presented to [facility] on 3/11/2025 with fatigue and leukocytosis. Her fatigue has been occurring for approximatly 2 weeks and is worse when she stands to cook food. She had an appointment with regular bloodwork with her PCP and her WBC was 38.96 so her PCP instructed her to come to the ED for evaluation due to her history of CLL.     In the ED, WBC is 34.48, creatinine 1.84 (baseline 1.19).  Otherwise, labs were unremarkable.  UA showed positive nitrites, moderate leukocytes, 21-50 WBC, 4+ bacteria.  She is afebrile, all vitals are stable.  Rocephin was given in ED and ED Provider consulted Hematology.  Hospitalist was consulted for further management.\"    Hospital Course:  LEXUS  -Creatinine 1.84 (baseline 1.19), monitor  -s/p IVF  -Avoid nephrotoxic medication and contrast  -Avoid hypotension  -Hold home Chlorthalidone, Irbesartan  -Cr is back to baseline 1.19     Leukocytosis  UTI  History of CLL  -UA showed positive nitrites, moderate leukocytes, 21-50 WBC, 4+ bacteria  -Urine culture - mixed franci  -Blood cultures - NGTD  -Rocephin given in ED, continue  - s/p IV Rocephin x 3 days  -Hematology recommendations appreciated "      History of PE/DVT  -Home Eliquis resumed     Essential Hypertension  -Controlled  -Monitor BP  -Continue home amlodipine        DISCHARGE Follow Up Recommendations for labs and diagnostics:   Follow up with primary care provider within 3 days of this discharge.   Follow up Oncology as outpatient.  Monitor BMP.  Chlorthalidone and Irbesartan were held because of LEXUS and stable BP on Tab Amlodipine 10 mg. Monitor BP and resume as appropriate.         Day of Discharge     Vital Signs:  Temp:  [97.7 °F (36.5 °C)-98.3 °F (36.8 °C)] 98.1 °F (36.7 °C)  Heart Rate:  [66-73] 66  Resp:  [12-20] 16  BP: (132-158)/(52-73) 155/73  Flow (L/min) (Oxygen Therapy):  [2] 2          Pertinent  and/or Most Recent Results     LAB RESULTS:      Lab 03/14/25  0635 03/13/25  0426 03/12/25  0948 03/12/25  0206 03/12/25  0155 03/11/25  1331   WBC 24.28* 27.26* 30.20*  --  34.48* 38.96*   HEMOGLOBIN 10.0* 10.0* 11.3*  --  11.8* 12.3   HEMATOCRIT 35.4 34.4 41.8  --  41.5 38.6   PLATELETS 119* 125* 129*  --  151 184   NEUTROS ABS 2.91 8.18* 4.53  --  4.48 4.68   EOS ABS 0.24 0.55* 0.60*  --   --   --    MCV 96.5 93.7 101.5*  --  96.5 88.1   SED RATE  --   --   --   --  19  --    CRP  --   --   --   --  <0.30  --    LACTATE  --   --   --  1.1  --   --          Lab 03/14/25  0453 03/13/25  0426 03/12/25  0948 03/12/25  0155 03/11/25  1331   SODIUM 139 138 139 140 141   POTASSIUM 4.5 4.2 4.6 4.2 4.5   CHLORIDE 112* 110* 110* 107 108*   CO2 18.2* 20.9* 14.5* 18.0* 20.6*   ANION GAP 8.8 7.1 14.5 15.0 12.4   BUN 26* 37* 44* 44* 40*   CREATININE 1.19* 1.32* 1.66* 1.84* 1.64*   EGFR 45.5* 40.1* 30.5* 26.9* 30.9*   GLUCOSE 137* 111* 131* 163* 103*   CALCIUM 8.2* 8.7 8.5* 8.8 9.2   HEMOGLOBIN A1C  --   --   --   --  6.60*   TSH  --   --   --   --  2.140         Lab 03/12/25  0155 03/11/25  1331   TOTAL PROTEIN 6.4 6.9   ALBUMIN 4.0 4.1   GLOBULIN 2.4 2.8   ALT (SGPT) 10 7   AST (SGOT) 19 13   BILIRUBIN 0.3 0.3   ALK PHOS 109 115         Lab  03/12/25  0304 03/12/25  0155   HSTROP T 28* 31*         Lab 03/11/25  1331   CHOLESTEROL 190   LDL CHOL 113*   HDL CHOL 47   TRIGLYCERIDES 174*             Brief Urine Lab Results  (Last result in the past 365 days)        Color   Clarity   Blood   Leuk Est   Nitrite   Protein   CREAT   Urine HCG        03/12/25 0203 Yellow   Cloudy   Negative   Moderate (2+)   Positive   Negative                 Microbiology Results (last 10 days)       Procedure Component Value - Date/Time    Blood Culture - Blood, Arm, Left [[id number]]  (Normal) Collected: 03/12/25 0304    Lab Status: Preliminary result Specimen: Blood from Arm, Left Updated: 03/14/25 0315     Blood Culture No growth at 2 days    Blood Culture - Blood, Hand, Left [[id number]]  (Normal) Collected: 03/12/25 0303    Lab Status: Preliminary result Specimen: Blood from Hand, Left Updated: 03/14/25 0315     Blood Culture No growth at 2 days    Urine Culture - Urine, Urine, Clean Catch [[id number]] Collected: 03/12/25 0203    Lab Status: Final result Specimen: Urine, Clean Catch Updated: 03/13/25 1023     Urine Culture >100,000 CFU/mL Mixed Leanna Isolated    Narrative:      Specimen contains mixed organisms of questionable pathogenicity suggestive of contamination. If symptoms persist, suggest recollection.  Colonization of the urinary tract without infection is common. Treatment is discouraged unless the patient is symptomatic, pregnant, or undergoing an invasive urologic procedure.    COVID-19, FLU A/B, RSV PCR 1 HR TAT - Swab, Nasopharynx [[id number]]  (Normal) Collected: 03/12/25 0050    Lab Status: Final result Specimen: Swab from Nasopharynx Updated: 03/12/25 0132     COVID19 Not Detected     Influenza A PCR Not Detected     Influenza B PCR Not Detected     RSV, PCR Not Detected    Narrative:      Fact sheet for providers: https://www.fda.gov/media/191149/download    Fact sheet for patients: https://www.fda.gov/media/831626/download    Test performed by PCR.     Urine Culture - Urine, Urine, Clean Catch [[id number]]  (Abnormal)  (Susceptibility) Collected: 03/11/25 1331    Lab Status: Final result Specimen: Urine, Clean Catch Updated: 03/13/25 0940     Urine Culture >100,000 CFU/mL Klebsiella aerogenes     Comment:   This organism may develop resistance during prolonged therapy with 3rd generation cephalosporins (e.g. ceftriaxone) as a result of de-repression of AmpC B-lactamase.  Ceftriaxone may be a reasonable treatment option for uncomplicated cystitis or other lower severity infections when susceptibility is demonstrated.       Narrative:      Colonization of the urinary tract without infection is common. Treatment is discouraged unless the patient is symptomatic, pregnant, or undergoing an invasive urologic procedure.    Susceptibility        Klebsiella aerogenes      JUAN      Cefepime Susceptible      Ceftazidime Susceptible      Ceftriaxone Susceptible      Gentamicin Susceptible      Levofloxacin Susceptible      Nitrofurantoin Susceptible      Piperacillin + Tazobactam Susceptible      Trimethoprim + Sulfamethoxazole Susceptible                                        Results for orders placed during the hospital encounter of 07/05/24    Duplex Venous Lower Extremity - Bilateral CAR    Interpretation Summary    Acute left lower extremity deep vein thrombosis noted in the gastrocnemius.    All other veins appeared normal bilaterally.      Results for orders placed during the hospital encounter of 07/05/24    Duplex Venous Lower Extremity - Bilateral CAR    Interpretation Summary    Acute left lower extremity deep vein thrombosis noted in the gastrocnemius.    All other veins appeared normal bilaterally.      Results for orders placed during the hospital encounter of 07/05/24    Adult Transthoracic Echo Complete W/ Cont if Necessary Per Protocol    Interpretation Summary    Left ventricular systolic function is normal. Calculated left ventricular EF = 62%    Left  ventricular wall thickness is consistent with mild concentric hypertrophy.    Left ventricular diastolic function is consistent with (grade Ia w/high LAP) impaired relaxation.    The left atrial cavity is mild to moderately dilated.    There is mild calcification of the aortic valve.    Transthoracic echocardiography reveals mild LVH with EF of 62%.  Diastolic dysfunction criteria noted.  Mild to moderate left atrial enlargement.  Mild AI and no effusion.    Electronically signed by Lucho Rodriguez MD, 07/06/24, 3:40 PM EDT.      Labs Pending at Discharge:  Pending Results       None            Procedures Performed           Consults:   Consults       Date and Time Order Name Status Description    3/12/2025 10:23 AM Hematology & Oncology Inpatient Consult      3/12/2025  3:33 AM IP Consult to Hematology and Oncology Completed     3/12/2025  3:33 AM Inpatient Hospitalist Consult                Discharge Details        Discharge Medications        Continue These Medications        Instructions Start Date   Accu-Chek Guide w/Device kit   1 kit, Not Applicable, Daily, Use to monitor blood sugar once daily      Accu-Chek Softclix Lancets lancets   1 each, Other, Daily, Use to monitor blood sugar once daily      amLODIPine 10 MG tablet  Commonly known as: NORVASC   10 mg, Oral, Daily, for high blood pressure      apixaban 5 MG tablet tablet  Commonly known as: ELIQUIS   5 mg, Oral, 2 Times Daily      [employer] Alcohol Prep Pads 70 % pads   APPLY 1 EACH TOPICALLY DAILY.      docusate sodium 100 MG capsule  Commonly known as: COLACE   1 capsule, 2 Times Daily      ferrous sulfate 324 (65 Fe) MG tablet delayed-release EC tablet   1 tablet, 3 Times Weekly      Hearing Aid Batteries misc   1 Units, Not Applicable, Every 7 Days      levothyroxine 100 MCG tablet  Commonly known as: SYNTHROID, LEVOTHROID   TAKE 1 TABLET BY MOUTH EVERY DAY      metFORMIN 500 MG tablet  Commonly known as: GLUCOPHAGE   500 mg, Oral, Daily With  Breakfast      metoclopramide 5 MG tablet  Commonly known as: REGLAN   5 mg, Oral, 3 Times Daily PRN      mirtazapine 15 MG tablet  Commonly known as: REMERON   15 mg, Oral, Every Night at Bedtime      pantoprazole 40 MG EC tablet  Commonly known as: PROTONIX   TAKE 1 TABLET BY MOUTH EVERY MORNING BEFORE BREAKFAST. TAKE ON AN EMPTY STOMACH.      rOPINIRole 0.25 MG tablet  Commonly known as: REQUIP   TAKE 1 TABLET BY MOUTH DAILY AT NIGHT 1 HOUR BEFORE BEDTIME      rosuvastatin 10 MG tablet  Commonly known as: CRESTOR   10 mg, Oral, Every Night at Bedtime      sertraline 100 MG tablet  Commonly known as: ZOLOFT   150 mg, Oral, Daily             Stop These Medications      chlorthalidone 25 MG tablet  Commonly known as: HYGROTON     irbesartan 300 MG tablet  Commonly known as: AVAPRO              No Known Allergies      Discharge Disposition:   Home or Self Care    Diet:  Hospital:  Diet Order   Procedures    Diet: Cardiac, Diabetic; Healthy Heart (2-3 Na+); Consistent Carbohydrate; Fluid Consistency: Thin (IDDSI 0)         Discharge Activity:   as tolerated      CODE STATUS:  Code Status and Medical Interventions: CPR (Attempt to Resuscitate); Full Support   Ordered at: 03/12/25 0351     Code Status (Patient has no pulse and is not breathing):    CPR (Attempt to Resuscitate)     Medical Interventions (Patient has pulse or is breathing):    Full Support         Future Appointments   Date Time Provider Department Center   9/12/2025  2:45 PM Asia Queen APRN [facility]           Time spent on Discharge including face to face service:  >35 minutes    Signature: Electronically signed by Silva Whatley MD, 03/14/25, 09:54 EDT.  [facility] Hospitalist Team

## 2025-03-14 NOTE — CASE MANAGEMENT/SOCIAL WORK
Case Management Discharge Note      Final Note: Routine home         Selected Continued Care - Discharged on 3/14/2025 Admission date: 3/11/2025 - Discharge disposition: Home or Self Care         Transportation Services  Private: Car    Final Discharge Disposition Code: 01 - home or self-care

## 2025-03-14 NOTE — TELEPHONE ENCOUNTER
Caller: JESUS SLAUGHTER    Relationship: Emergency Contact    Best call back number: [phone]    What medication are you requesting: REPLACEMENT FOR ELIQUIS      If a prescription is needed, what is your preferred pharmacy and phone number: [id number] - [place], [place] - [address] [place] [address] AT [facility] [address] - [phone]  - [phone]      Additional notes:      PATIENT'S DAUGHTER CALLED STATING SHE WENT TO THE PHARMACY TO  THE PATIENTS ELIQUIS AND IT WILL COST $297.    PLEASE CALL WHEN SOMETHING HAS BEEN SENT.

## 2025-03-17 ENCOUNTER — LAB (OUTPATIENT)
Dept: FAMILY MEDICINE CLINIC | Facility: CLINIC | Age: 85
End: 2025-03-17
Payer: MEDICARE

## 2025-03-17 ENCOUNTER — OFFICE VISIT (OUTPATIENT)
Dept: FAMILY MEDICINE CLINIC | Facility: CLINIC | Age: 85
End: 2025-03-17
Payer: MEDICARE

## 2025-03-17 ENCOUNTER — TRANSITIONAL CARE MANAGEMENT TELEPHONE ENCOUNTER (OUTPATIENT)
Dept: CALL CENTER | Facility: HOSPITAL | Age: 85
End: 2025-03-17
Payer: MEDICARE

## 2025-03-17 ENCOUNTER — LAB (OUTPATIENT)
Dept: LAB | Facility: HOSPITAL | Age: 85
End: 2025-03-17
Payer: MEDICARE

## 2025-03-17 ENCOUNTER — TRANSCRIBE ORDERS (OUTPATIENT)
Dept: FAMILY MEDICINE CLINIC | Facility: CLINIC | Age: 85
End: 2025-03-17

## 2025-03-17 VITALS
BODY MASS INDEX: 29.95 KG/M2 | SYSTOLIC BLOOD PRESSURE: 159 MMHG | WEIGHT: 169 LBS | TEMPERATURE: 98.4 F | OXYGEN SATURATION: 97 % | DIASTOLIC BLOOD PRESSURE: 90 MMHG | HEIGHT: 63 IN | HEART RATE: 83 BPM

## 2025-03-17 DIAGNOSIS — N39.0 URINARY TRACT INFECTION WITHOUT HEMATURIA, SITE UNSPECIFIED: ICD-10-CM

## 2025-03-17 DIAGNOSIS — C91.10 CLL (CHRONIC LYMPHOCYTIC LEUKEMIA): ICD-10-CM

## 2025-03-17 DIAGNOSIS — N39.0 URINARY TRACT INFECTION WITHOUT HEMATURIA, SITE UNSPECIFIED: Primary | ICD-10-CM

## 2025-03-17 DIAGNOSIS — N17.9 AKI (ACUTE KIDNEY INJURY): ICD-10-CM

## 2025-03-17 DIAGNOSIS — I10 ESSENTIAL HYPERTENSION: ICD-10-CM

## 2025-03-17 DIAGNOSIS — I27.82 CHRONIC PULMONARY EMBOLISM WITHOUT ACUTE COR PULMONALE, UNSPECIFIED PULMONARY EMBOLISM TYPE: ICD-10-CM

## 2025-03-17 LAB
ANION GAP SERPL CALCULATED.3IONS-SCNC: 14 MMOL/L (ref 5–15)
BACTERIA SPEC AEROBE CULT: NORMAL
BACTERIA SPEC AEROBE CULT: NORMAL
BILIRUB UR QL STRIP: NEGATIVE
BUN SERPL-MCNC: 19 MG/DL (ref 8–23)
BUN/CREAT SERPL: 15.8 (ref 7–25)
CALCIUM SPEC-SCNC: 9.3 MG/DL (ref 8.6–10.5)
CHLORIDE SERPL-SCNC: 109 MMOL/L (ref 98–107)
CLARITY UR: CLEAR
CO2 SERPL-SCNC: 18 MMOL/L (ref 22–29)
COLOR UR: YELLOW
CREAT SERPL-MCNC: 1.2 MG/DL (ref 0.57–1)
EGFRCR SERPLBLD CKD-EPI 2021: 45 ML/MIN/1.73
GLUCOSE SERPL-MCNC: 110 MG/DL (ref 65–99)
GLUCOSE UR STRIP-MCNC: NEGATIVE MG/DL
HGB UR QL STRIP.AUTO: NEGATIVE
HOLD SPECIMEN: NORMAL
KETONES UR QL STRIP: NEGATIVE
LEUKOCYTE ESTERASE UR QL STRIP.AUTO: NEGATIVE
NITRITE UR QL STRIP: NEGATIVE
PH UR STRIP.AUTO: 5.5 [PH] (ref 5–8)
POTASSIUM SERPL-SCNC: 4.3 MMOL/L (ref 3.5–5.2)
PROT UR QL STRIP: ABNORMAL
SODIUM SERPL-SCNC: 141 MMOL/L (ref 136–145)
SP GR UR STRIP: 1.02 (ref 1–1.03)
UROBILINOGEN UR QL STRIP: ABNORMAL

## 2025-03-17 PROCEDURE — 99495 TRANSJ CARE MGMT MOD F2F 14D: CPT | Performed by: NURSE PRACTITIONER

## 2025-03-17 PROCEDURE — 80048 BASIC METABOLIC PNL TOTAL CA: CPT | Performed by: NURSE PRACTITIONER

## 2025-03-17 PROCEDURE — 81003 URINALYSIS AUTO W/O SCOPE: CPT | Performed by: NURSE PRACTITIONER

## 2025-03-17 PROCEDURE — 1159F MED LIST DOCD IN RCRD: CPT | Performed by: NURSE PRACTITIONER

## 2025-03-17 PROCEDURE — 1111F DSCHRG MED/CURRENT MED MERGE: CPT | Performed by: NURSE PRACTITIONER

## 2025-03-17 PROCEDURE — 1126F AMNT PAIN NOTED NONE PRSNT: CPT | Performed by: NURSE PRACTITIONER

## 2025-03-17 PROCEDURE — 1160F RVW MEDS BY RX/DR IN RCRD: CPT | Performed by: NURSE PRACTITIONER

## 2025-03-17 PROCEDURE — 36415 COLL VENOUS BLD VENIPUNCTURE: CPT

## 2025-03-17 NOTE — OUTREACH NOTE
Call Center TCM Note      Flowsheet Row Responses   [facility] patient discharged from? [facility]   Does the patient have one of the following disease processes/diagnoses(primary or secondary)? Other   TCM attempt successful? Yes   Comments Patient completed an office visit with PCP today within TCM guidelines, fulfilling TCM requirements. TCM complete.   Does the patient have an appointment with their PCP within 7-14 days of discharge? Yes   TCM call completed? Yes   Wrap up additional comments Patient completed an office visit with PCP today within TCM guidelines, fulfilling TCM requirements. TCM complete.            Yamel Yap RN    3/17/2025, 11:23 EDT

## 2025-03-17 NOTE — PROGRESS NOTES
"Transitional Care Follow Up Visit  Subjective     Diane Guan is a 84 y.o. female who presents for a transitional care management visit.    Within 48 business hours after discharge our office contacted her via telephone to coordinate her care and needs.      I reviewed and discussed the details of that call along with the discharge summary, hospital problems, inpatient lab results, inpatient diagnostic studies, and consultation reports with Diane.     Current outpatient and discharge medications have been reconciled for the patient.  Reviewed by: SIOMARA Hui          3/14/2025     5:50 PM   Date of TCM Phone Call   [facility]   Date of Admission 3/11/2025   Date of Discharge 3/14/2025   Discharge Disposition Home or Self Care     Risk for Readmission (LACE) Score: 12 (3/14/2025  6:00 AM)      History of Present Illness   Course During Hospital Stay:  Hospital course per hospital records: \"Diane Guan is a 84 y.o. female with a previous medical history of CLL, CKD stage 3B, DM, HLD, HTN, Hypothyroidism who presented to [facility] on 3/11/2025 with fatigue and leukocytosis. Her fatigue has been occurring for approximatly 2 weeks and is worse when she stands to cook food. She had an appointment with regular bloodwork with her PCP and her WBC was 38.96 so her PCP instructed her to come to the ED for evaluation due to her history of CLL.     In the ED, WBC is 34.48, creatinine 1.84 (baseline 1.19).  Otherwise, labs were unremarkable.  UA showed positive nitrites, moderate leukocytes, 21-50 WBC, 4+ bacteria.  She is afebrile, all vitals are stable.  Rocephin was given in ED and ED Provider consulted Hematology.  Hospitalist was consulted for further management.\"     Hospital Course:  LEXUS  -Creatinine 1.84 (baseline 1.19), monitor  -s/p IVF  -Avoid nephrotoxic medication and contrast  -Avoid hypotension  -Hold home Chlorthalidone, Irbesartan  -Cr is back to baseline 1.19   " "  Leukocytosis  UTI  History of CLL  -UA showed positive nitrites, moderate leukocytes, 21-50 WBC, 4+ bacteria  -Urine culture - mixed franci  -Blood cultures - NGTD  -Rocephin given in ED, continue  - s/p IV Rocephin x 3 days  -Hematology recommendations appreciated      History of PE/DVT  -Home Eliquis resumed     Essential Hypertension  -Controlled  -Monitor BP  -Continue home amlodipine         DISCHARGE Follow Up Recommendations for labs and diagnostics:   Follow up with primary care provider within 3 days of this discharge.   Follow up Oncology as outpatient.  Monitor BMP.  Chlorthalidone and Irbesartan were held because of LEXUS and stable BP on Tab Amlodipine 10 mg. Monitor BP and resume as appropriate.\"     Feeling some better today.   Holding irbesartan and chlorthalidone due to LEXUS. Taking amlodipine 10 mg daily for HTN.  BP today is 159/90.  Denies any CP; palpitations; SOA; dizziness; headache.    Hasn't scheduled follow up with oncology yet; wasn't happy with previous provider.    Hx of PE - wasn't able to restart Eliquis due to cost of medication.  If switched to warfarin, lives further away so coming in for INR checks isn't always possible.        The following portions of the patient's history were reviewed and updated as appropriate: allergies, current medications, past family history, past medical history, past social history, past surgical history, and problem list.    Review of Systems   Constitutional:  Negative for chills, fatigue and fever.   Respiratory:  Negative for cough, shortness of breath and wheezing.    Cardiovascular:  Negative for chest pain and palpitations.   Gastrointestinal:  Negative for abdominal pain, blood in stool, constipation, diarrhea, nausea and vomiting.   Genitourinary:  Negative for dysuria, frequency, hematuria and urgency.   Neurological:  Negative for dizziness and headaches.   Psychiatric/Behavioral:  Negative for dysphoric mood. The patient is not nervous/anxious. " "       Objective   /90 (BP Location: Left arm, Patient Position: Sitting, Cuff Size: Adult)   Pulse 83   Temp 98.4 °F (36.9 °C) (Temporal)   Ht 160 cm (63\")   Wt 76.7 kg (169 lb)   SpO2 97%   BMI 29.94 kg/m²   Physical Exam  Vitals reviewed.   Constitutional:       General: She is not in acute distress.     Appearance: Normal appearance.   Cardiovascular:      Rate and Rhythm: Normal rate and regular rhythm.      Pulses: Normal pulses.      Heart sounds: Normal heart sounds.   Pulmonary:      Effort: Pulmonary effort is normal. No respiratory distress.      Breath sounds: Normal breath sounds. No wheezing, rhonchi or rales.   Chest:      Chest wall: No tenderness.   Abdominal:      Tenderness: There is no right CVA tenderness or left CVA tenderness.   Skin:     General: Skin is warm and dry.      Findings: Bruising (on forearms from recent IV and blood draw) present.   Neurological:      Mental Status: She is alert and oriented to person, place, and time. Mental status is at baseline.   Psychiatric:         Mood and Affect: Mood normal.         Assessment & Plan   Diagnoses and all orders for this visit:    1. Urinary tract infection without hematuria, site unspecified (Primary)  Comments:  Unable to give urine sample today.    Will put in UA order to take to her local hospital later for testing.  Orders:  -     Basic metabolic panel; Future  -     Urinalysis With Culture If Indicated -; Future    2. LEXUS (acute kidney injury)  Comments:  BMP today.  Orders:  -     Basic metabolic panel; Future    3. Chronic pulmonary embolism without acute cor pulmonale, unspecified pulmonary embolism type  Comments:  Started process for patient assistance for Eliquis.    Samples given today.  Orders:  -     apixaban (ELIQUIS) 5 MG tablet tablet; Take 1 tablet by mouth 2 (Two) Times a Day.  Dispense: 180 tablet; Refill: 3    4. Essential hypertension  Comments:  Will check BMP today; if kidney function improved will add " irebsartan back.    Cont. amlodipine.    5. CLL (chronic lymphocytic leukemia)  Comments:  Referral to see new oncology provider given  Orders:  -     Ambulatory Referral to Oncology

## 2025-03-18 ENCOUNTER — TELEPHONE (OUTPATIENT)
Dept: ONCOLOGY | Facility: CLINIC | Age: 85
End: 2025-03-18
Payer: MEDICARE

## 2025-03-18 NOTE — TELEPHONE ENCOUNTER
Called patient to schedule a new patient hospital follow up and daughter stated that she is going to follow up with another office/provider.

## 2025-03-18 NOTE — TELEPHONE ENCOUNTER
Called to [facility] an appt from New Pt Ref. No answer and unable to leave v/m

## 2025-03-20 ENCOUNTER — TELEPHONE (OUTPATIENT)
Dept: FAMILY MEDICINE CLINIC | Facility: CLINIC | Age: 85
End: 2025-03-20
Payer: MEDICARE

## 2025-03-20 NOTE — TELEPHONE ENCOUNTER
Patient came in on the Nurse cami for a INR check, but patient is on Eliquis 5mg , I did check with Asia's AQUILINO.A due to Asia out of office on 03/20/25, after checking patient chart to make sure, cause no plan was put in place for INR to be done.

## 2025-03-24 ENCOUNTER — READMISSION MANAGEMENT (OUTPATIENT)
Dept: CALL CENTER | Facility: HOSPITAL | Age: 85
End: 2025-03-24
Payer: MEDICARE

## 2025-03-24 NOTE — OUTREACH NOTE
Medical Week 2 Survey      Flowsheet Row Responses   [facility] patient discharged from? [facility]   Does the patient have one of the following disease processes/diagnoses(primary or secondary)? Other   Week 2 attempt successful? Yes   Call start time 1900   Discharge diagnosis Leukocytosis   Call end time 1907   Person spoke with today (if not patient) and relationship pt   Meds reviewed with patient/caregiver? Yes   Is the patient having any side effects they believe may be caused by any medication additions or changes? No   Does the patient have all medications ordered at discharge? Yes   Is the patient taking all medications as directed (includes completed medication regime)? Yes   Does the patient have a primary care provider?  Yes   Does the patient have an appointment with their PCP within 7 days of discharge? Yes   Has the patient kept scheduled appointments due by today? Yes   Psychosocial issues? No   What is the patient's perception of their health status since discharge? Improving   Is the patient/caregiver able to teach back signs and symptoms related to disease process for when to call PCP? Yes   Is the patient/caregiver able to teach back signs and symptoms related to disease process for when to call 911? Yes   Is the patient/caregiver able to teach back the hierarchy of who to call/visit for symptoms/problems? PCP, Specialist, Home health nurse, Urgent Care, ED, 911 Yes   If the patient is a current smoker, are they able to teach back resources for cessation? Not a smoker   Week 2 Call Completed? Yes   Is the patient interested in additional calls from an ambulatory ? No   Would this patient benefit from a Referral to Amb Social Work? No   Wrap up additional comments Pt states she is doing good, and not having any urinary issues. Pt does report her right knee is hurting her and she wants to have surgery to fix right knee. Pt awaiting for dtr to come back from vacation and then will  inquire about it with PCP.   Call end time 1907            Keisha MITCHELL - Registered Nurse

## 2025-04-07 NOTE — PROGRESS NOTES
HEMATOLOGY ONCOLOGY OUTPATIENT CONSULTATION       Patient name: Diane Guan  : 1940  MRN: [id number]  Primary Care Physician: Asia Queen APRN  Referring Physician: Asia Queen APRN  Reason For Consult:       History of Present Illness:  Diane Guan is a 84 y.o. female who presented to [facility] on 3/11/2025 with complaints of fatigue and leukocytosis.  Past medical history of CLL, CKD, DM, hyperlipidemia, hypertension.  Patient reported she had been having an ongoing fatigue for 2 weeks.  She had an appointment with her PCP and her WBC was 38.6 so her PCP instructed her to come to the ED for evaluation due to her history of CLL.  In the ED her WBC was 34.48, creatinine 1.84 otherwise labs were unremarkable.  UA was positive for nitrites, moderate leukocytes, 4+ bacteria.  She was given IV Rocephin in the ED and the ED provider consulted hematology.  She was admitted for further treatment of her UTI.     25  Hematology/Oncology was consulted for leukocytosis in a patient with a history of CLL and current UTI.  Patient is known to us and has been seen previously by Dr. Bolden in our office for her diagnoses of stage II right breast cancer for which she underwent right mastectomy, ER, MD positive HER2/juan luis negative CLL diagnosed in 2017 during a workup for persistent leukocytosis.  Oncological history per the chart as follows:     Chronic lymphocytic leukemia clinical stage 0 diagnosed in  on peripheral blood flow cytometry.  Was ordered due to persistent leukocytosis with differential lymphocytosis patient had CT scans which do not show any significant lymphadenopathy at that time and therefore she was observed.  Follow-up CT scans completed in 2022 showed no evidence of progressive lymphadenopathy.  Also clinical stage II right breast cancer status post right mastectomy treated with Arimidex that was initiated in .   Patient has been lost to follow-up in our office since 9/15/2023.  She was seen during a previous hospitalization in November 2023.    She was last seen in our office in 2019     Recently she had a CBC done which showed persistent leukocytosis with white count ranging from 15-30,000, she has anemia with hemoglobin of 11.4, normal MCV and normal platelets.  She has differential lymphocytosis noted on her counts.  On her chemistry panel she has a creatinine that ranges between 0.9-1.34        Breast Cancer history:  This is an 83-year-old female with a history of stage II right breast cancer for which she underwent right mastectomy.  Tumor was invasive well-differentiated ductal carcinoma, estrogen receptor +100%, progesterone receptor +100% and HER2/juan luis was negative.  Patient has been on anastrozole since 2019 prescribed by Dr. Palmer.       Patient has discontinued AI therapy. She states she is beginning to feel better since admission       Over the past several years she has had progressive decline in her functionality.  She sits or lays down more than half of the time at home.  She was able to complete her daily activities.  She has significant night sweats but no weight loss.  If anything she is beginning to gain weight.   Patient is  and lives with her spouse.  She is accompanied today by her daughter for this appointment.     9/16/2022 patient had left screening mammogram which was essentially negative.  Follow-up in 1 year was recommended  9/16/2022 patient had CT scan of the chest, abdomen and pelvis there was no pathologically enlarged lymph nodes within the chest.  There was no pathologically enlarged lymph nodes within the abdomen or pelvis.  She has normal  spleen size    History of Present Illness  4/10/25: The patient presents for evaluation of chronic lymphocytic leukemia, breast cancer, anemia, and kidney disease. She is accompanied by her daughter.    She has a history of right breast cancer,  for which she underwent a mastectomy approximately 6 years ago. Post-surgery, she was on hormone therapy for 5 years, which she tolerated well. She was not advised to continue the hormone therapy beyond this period. A mammogram and bone density test are scheduled for 04/18/2025 at [facility]. She reports no history of osteoporosis and does not take any calcium or vitamin D supplements.    She has been diagnosed with chronic lymphocytic leukemia (CLL) for several years but has not received any treatment for it. During her last consultation with Dr. Bolden, she was informed that her CLL status remained unchanged and was scheduled for a follow-up in 6 months. She has not undergone any laboratory tests since her hospital discharge in March 2025. She reports no changes in appetite, weight loss, night sweats, or palpable masses.    She has a history of DVT, with the most recent episode occurring in 10/2023. She has been intermittently on Eliquis 5 mg since then, but due to financial constraints, she has been off the medication for the past few days. She has not been on any other anticoagulants.    She also has CKD stage 3.  She was recently admitted to [facility] for UTI as above, She has completed antibiotic therapy and denied any new symptoms.    She has been on an iron supplement 3 times a week for the past 5 years. She has not had her iron levels checked recently.    Supplemental Information  She has been under the care of Dr. Bolden and was recently hospitalized due to a urinary tract infection (UTI). Since her discharge a month ago, she has been faring well, although she experienced a fall at home. She reports intermittent leg weakness and foot numbness, which limit her mobility. She has received injections in both knees, with the right knee being more problematic, but these have not provided relief. She has been receiving occupational and physical therapy at home post-hospitalization, but she is reluctant to  continue due to her age and financial constraints. She is diabetic.    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/2019    Depression     Diabetes mellitus     Disease of thyroid gland     Diverticulosis of colon 2018    Identified on colonoscopy    DVT (deep venous thrombosis)     Dysphagia 12/2020    Dyspnea on exertion     Fall at home 10/11/2023    Hemorrhoid     Hiatal hernia 12/2020    Hiatal hernia with GERD     large hiatal hernia with high-grade reflux on barium swallow; s/p laparoscopic fundoplication with gastropexy    History of breast cancer 10/11/2023    History of cigarette smoking 10/11/2023    History of diabetes mellitus     no meds now    HL (hearing loss)     Hyperlipidemia     Hypertension     Knee swelling     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    aspiration pneumonia; completed abx therapy    OAB (overactive bladder)     Personal history of CLL (chronic lymphocytic leukemia) 10/11/2023    Pulmonary emboli     Skin cancer     Sleep apnea     daughter states pt does not have and doesn't have machine       Past Surgical History:   Procedure Laterality Date    BLADDER SURGERY      BREAST BIOPSY      BRONCHOSCOPY N/A 09/13/2019    Procedure: BRONCHOSCOPY with bronchial washing;  Surgeon: Marnie Frankel MD;  Location: [facility] ENDOSCOPY;  Service: Pulmonary    BRONCHOSCOPY Bilateral 10/18/2023    Procedure: BRONCHOSCOPY AT BEDSIDE;  Surgeon: Vinicius Heredia DO;  Location: [facility] ENDOSCOPY;  Service: Pulmonary;  Laterality: Bilateral;    COLONOSCOPY  07/19/2018    severe diverticulosis    CYST REMOVAL      Removed a fatty cyst off of her back    ENDOSCOPY N/A 11/23/2020    Procedure: ESOPHAGOGASTRODUODENOSCOPY with dilatation (Bougie  # 48, 50, 52, 54, 56, 58);  Surgeon: Den Plummer DO;  Location: [facility] ENDOSCOPY;  Service: General;  Laterality: N/A;  Post: large hiatal hernia, joshua's ulcers    EYE SURGERY      HERNIA REPAIR      HIATAL HERNIA REPAIR N/A 12/30/2020    Procedure: Laparoscopic hiatal hernia repair with gastropexy;  Surgeon: Den Plummer DO;  Location: [facility] MAIN OR;  Service: General;  Laterality: N/A;    MASTECTOMY Right 02/04/2019    Invasive ductal carcinoma    TUBAL ABDOMINAL LIGATION           Current Outpatient Medications:     Accu-Chek Softclix Lancets lancets, 1 each by Other route Daily. Use to monitor blood sugar once daily, Disp: 100 each, Rfl: 0    Alcohol Swabs ([employer] Alcohol Prep Pads) 70 % pads, APPLY 1 EACH TOPICALLY DAILY., Disp: , Rfl:     amLODIPine (NORVASC) 10 MG tablet, Take 1 tablet by mouth Daily. for high blood pressure, Disp: 90 tablet, Rfl: 1    apixaban (ELIQUIS) 5 MG tablet tablet, Take 1 tablet by mouth 2 (Two) Times a Day., Disp: 180 tablet, Rfl: 3    Blood Glucose Monitoring Suppl (Accu-Chek Guide) w/Device kit, Use 1 kit Daily. Use to monitor blood sugar once daily, Disp: 1 kit, Rfl: 0    docusate sodium (COLACE) 100 MG capsule, Take 1 capsule by mouth 2 (Two) Times a Day. Indications: Constipation, Disp: , Rfl: 3    ferrous sulfate 324 (65 Fe) MG tablet delayed-release EC tablet, Take 1 tablet by mouth 3 (Three) Times a Week. Indications: Iron Deficiency, Disp: , Rfl:     Hearing Aid Batteries misc, 1 Units Every 7 (Seven) Days., Disp: 52 each, Rfl: 0    levothyroxine (SYNTHROID, LEVOTHROID) 100 MCG tablet, TAKE 1 TABLET BY MOUTH EVERY DAY, Disp: 90 tablet, Rfl: 3    losartan (Cozaar) 50 MG tablet, Take 1 tablet by mouth Daily., Disp: 30 tablet, Rfl: 3    metFORMIN (GLUCOPHAGE) 500 MG tablet, TAKE 1 TABLET BY MOUTH EVERY DAY WITH BREAKFAST, Disp: 90 tablet, Rfl: 3    metoclopramide (REGLAN) 5 MG tablet, TAKE 1 TABLET BY MOUTH 3 (THREE) TIMES A DAY AS NEEDED (FOR NAUSEA AND VOMITING).,  "Disp: 270 tablet, Rfl: 2    mirtazapine (REMERON) 15 MG tablet, TAKE 1 TABLET BY MOUTH EVERYDAY AT BEDTIME, Disp: 90 tablet, Rfl: 1    pantoprazole (PROTONIX) 40 MG EC tablet, TAKE 1 TABLET BY MOUTH EVERY MORNING BEFORE BREAKFAST. TAKE ON AN EMPTY STOMACH., Disp: 90 tablet, Rfl: 1    rOPINIRole (REQUIP) 0.25 MG tablet, TAKE 1 TABLET BY MOUTH DAILY AT NIGHT 1 HOUR BEFORE BEDTIME, Disp: 90 tablet, Rfl: 3    rosuvastatin (CRESTOR) 10 MG tablet, TAKE 1 TABLET BY MOUTH EVERY DAY AT NIGHT, Disp: 90 tablet, Rfl: 3    sertraline (ZOLOFT) 100 MG tablet, TAKE 1 AND 1/2 TABLETS BY MOUTH EVERY DAY, Disp: 135 tablet, Rfl: 3    No Known Allergies    Family History   Problem Relation Age of Onset    Diabetes Mother     Arthritis Other     Heart disease Other     Arthritis Father     Hyperlipidemia Father     Miscarriages / Stillbirths Daughter        Cancer-related family history is not on file.      Social History     Tobacco Use    Smoking status: Former     Current packs/day: 0.00     Average packs/day: 0.5 packs/day for 2.0 years (1.0 ttl pk-yrs)     Types: Cigarettes     Start date: 1990     Quit date: 1992     Years since quittin.2    Smokeless tobacco: Never   Vaping Use    Vaping status: Never Used   Substance Use Topics    Alcohol use: No    Drug use: No     Social History     Social History Narrative    Not on file       ROS:   Review of Systems   Constitutional:  Positive for fatigue.   Musculoskeletal:  Positive for arthralgias and gait problem.         Objective:    Vital Signs:  Vitals:    04/10/25 1424   BP: 139/82   Pulse: 83   Resp: 18   Temp: 98.2 °F (36.8 °C)   SpO2: 97%   Weight: 75.7 kg (166 lb 12.8 oz)   Height: 160 cm (63\")   PainSc: 0-No pain     Body mass index is 29.55 kg/m².    ECOG  (2) Ambulatory and capable of self care, unable to carry out work activity, up and about > 50% or waking hours    Physical Exam:   Physical Exam  Constitutional:       Appearance: Normal appearance. She is " normal weight.   HENT:      Head: Normocephalic and atraumatic.      Right Ear: External ear normal.      Left Ear: External ear normal.      Nose: Nose normal.      Mouth/Throat:      Mouth: Mucous membranes are moist.      Pharynx: Oropharynx is clear.   Eyes:      Extraocular Movements: Extraocular movements intact.      Conjunctiva/sclera: Conjunctivae normal.      Pupils: Pupils are equal, round, and reactive to light.   Cardiovascular:      Rate and Rhythm: Normal rate.      Pulses: Normal pulses.   Pulmonary:      Effort: Pulmonary effort is normal.   Abdominal:      General: Abdomen is flat.      Palpations: Abdomen is soft.   Musculoskeletal:         General: Normal range of motion.      Cervical back: Normal range of motion and neck supple.   Skin:     General: Skin is warm.   Neurological:      Mental Status: She is alert.   Psychiatric:         Mood and Affect: Mood normal.         Behavior: Behavior normal.         Thought Content: Thought content normal.         Judgment: Judgment normal.         Lab Results - Last 18 Months   Lab Units 03/14/25  0635 03/13/25  0426 03/12/25  0948   WBC 10*3/mm3 24.28* 27.26* 30.20*   HEMOGLOBIN g/dL 10.0* 10.0* 11.3*   HEMATOCRIT % 35.4 34.4 41.8   PLATELETS 10*3/mm3 119* 125* 129*   MCV fL 96.5 93.7 101.5*     Lab Results - Last 18 Months   Lab Units 03/17/25  1131 03/14/25  0453 03/13/25  0426 03/12/25  0948 03/12/25  0155 03/11/25  1331 07/22/24  0837   SODIUM mmol/L 141 139 138   < > 140 141 140   POTASSIUM mmol/L 4.3 4.5 4.2   < > 4.2 4.5 4.5   CHLORIDE mmol/L 109* 112* 110*   < > 107 108* 108*   CO2 mmol/L 18.0* 18.2* 20.9*   < > 18.0* 20.6* 22.0   BUN mg/dL 19 26* 37*   < > 44* 40* 27*   CREATININE mg/dL 1.20* 1.19* 1.32*   < > 1.84* 1.64* 1.19*   CALCIUM mg/dL 9.3 8.2* 8.7   < > 8.8 9.2 9.1   BILIRUBIN mg/dL  --   --   --   --  0.3 0.3 <0.2   ALK PHOS U/L  --   --   --   --  109 115 101   ALT (SGPT) U/L  --   --   --   --  10 7 11   AST (SGOT) U/L  --   --    "--   --  19 13 14   GLUCOSE mg/dL 110* 137* 111*   < > 163* 103* 125*    < > = values in this interval not displayed.       Lab Results   Component Value Date    GLUCOSE 110 (H) 03/17/2025    BUN 19 03/17/2025    CREATININE 1.20 (H) 03/17/2025    EGFRIFNONA 38 (L) 10/28/2021    EGFRIFAFRI 59 (L) 04/27/2017    BCR 15.8 03/17/2025    K 4.3 03/17/2025    CO2 18.0 (L) 03/17/2025    CALCIUM 9.3 03/17/2025    ALBUMIN 4.0 03/12/2025    AST 19 03/12/2025    ALT 10 03/12/2025       Lab Results - Last 18 Months   Lab Units 10/20/23  0449   INR  1.05       Lab Results   Component Value Date    IRON 41 07/06/2024    TIBC 383 07/06/2024    FERRITIN 368.10 (H) 10/19/2023       Lab Results   Component Value Date    FOLATE 12.30 07/06/2024       No results found for: \"OCCULTBLD\"    Lab Results   Component Value Date    RETICCTPCT 1.77 08/31/2022     Lab Results   Component Value Date    WHWAYKKI99 398 07/06/2024     No results found for: \"SPEP\", \"UPEP\"  LDH   Date Value Ref Range Status   10/29/2023 284 (H) 135 - 214 U/L Final     Uric Acid   Date Value Ref Range Status   09/15/2023 5.9 (H) 2.4 - 5.7 mg/dL Final     Lab Results   Component Value Date    ZEB Negative 10/19/2023    SEDRATE 19 03/12/2025     Lab Results   Component Value Date    HAPTOGLOBIN 472 (H) 10/29/2023     Lab Results   Component Value Date    PTT 23.6 (L) 12/18/2020    INR 1.05 10/20/2023     No results found for: \"\"  No results found for: \"CEA\"  No components found for: \"CA-19-9\"  No results found for: \"PSA\"  Lab Results   Component Value Date    SEDRATE 19 03/12/2025          Assessment & Plan :      Chronic lymphocytic leukemia: First diagnosed in 2017.    - She has not required any treatment since her diagnosis.  WBC currently 30.20 down from 38.96 with the initiation of IV antibiotics for treatment of her UTI.  Baseline WBC over the past months 20-30,000.  Recent labs from hospital admission reviewed, WBC count near baseline on discharge at " 24K.  -check CBC, LDH, B2 microglobulin,  flow cytometry, TP53 mutation analysis prior to follow up    History of stage II right breast cancer status post right mastectomy.  ER positive, NH positive, HER2/juan luis negative.    -Previously on Arimidex that was started in 2019.  Patient self discontinued Arimidex  after 5 years of therapy    3. Surveillance: has upcoming mammogram in April 2025, Will follow up on results    4. Bone health: Planned for DEXA scan, no prior history of osteoporosis. Advised pt to start taking oral Vit D-Calcium supplement.    UTI:   -Patient has completed oral antibiotic therapy, no recurrent symptoms presently    History of pulmonary emboli/acute left lower extremity DVT: Diagnosed in July 2024.    - She is currently on On Eliquis 5mg BID, has financial issues, Will request Financial counsellor evaluation for this.  -candidate for indefinite anticoagulation, Will prefer DOAC therapy over warfarin given her fall risk and risk of bleeding,    Mild macrocytic anemia:   History of Iron deficiency: on oral iron  - Hemoglobin 11.3 g/dL, .5. during hospital admission  -Will recheck CBC, iron panel, b12, Folate, BAL, SPEP and FLC assay  -anemia is likely contributed by underlying CKD, DM and recent infection as well.   -consider IV iron if no improvement in iron parameters.     7. Frequent falls: she has undergone Physical therapy before, does not wish to pursue it again at this time, Will recommend fall precautions and using walking assisting device (cane/walker).     6 week follow up with lab results, sooner as needed.    Thank you very much for providing the opportunity to participate in this patient’s care. Please do not hesitate to call if there are any other questions.

## 2025-04-08 LAB — NCCN CRITERIA FLAG: NORMAL

## 2025-04-10 ENCOUNTER — CONSULT (OUTPATIENT)
Dept: ONCOLOGY | Facility: CLINIC | Age: 85
End: 2025-04-10
Payer: MEDICARE

## 2025-04-10 VITALS
BODY MASS INDEX: 29.55 KG/M2 | DIASTOLIC BLOOD PRESSURE: 82 MMHG | TEMPERATURE: 98.2 F | HEIGHT: 63 IN | OXYGEN SATURATION: 97 % | SYSTOLIC BLOOD PRESSURE: 139 MMHG | HEART RATE: 83 BPM | RESPIRATION RATE: 18 BRPM | WEIGHT: 166.8 LBS

## 2025-04-10 DIAGNOSIS — Z17.0 MALIGNANT NEOPLASM OF RIGHT BREAST IN FEMALE, ESTROGEN RECEPTOR POSITIVE, UNSPECIFIED SITE OF BREAST: ICD-10-CM

## 2025-04-10 DIAGNOSIS — N18.30 STAGE 3 CHRONIC KIDNEY DISEASE, UNSPECIFIED WHETHER STAGE 3A OR 3B CKD: ICD-10-CM

## 2025-04-10 DIAGNOSIS — C91.10 CLL (CHRONIC LYMPHOCYTIC LEUKEMIA): ICD-10-CM

## 2025-04-10 DIAGNOSIS — D64.9 ANEMIA, UNSPECIFIED TYPE: Primary | ICD-10-CM

## 2025-04-10 DIAGNOSIS — D50.9 IRON DEFICIENCY ANEMIA, UNSPECIFIED IRON DEFICIENCY ANEMIA TYPE: ICD-10-CM

## 2025-04-10 DIAGNOSIS — C50.911 MALIGNANT NEOPLASM OF RIGHT BREAST IN FEMALE, ESTROGEN RECEPTOR POSITIVE, UNSPECIFIED SITE OF BREAST: ICD-10-CM

## 2025-04-18 ENCOUNTER — LAB (OUTPATIENT)
Dept: LAB | Facility: HOSPITAL | Age: 85
End: 2025-04-18
Payer: MEDICARE

## 2025-04-18 ENCOUNTER — HOSPITAL ENCOUNTER (OUTPATIENT)
Dept: BONE DENSITY | Facility: HOSPITAL | Age: 85
Discharge: HOME | End: 2025-04-18
Payer: MEDICARE

## 2025-04-18 ENCOUNTER — HOSPITAL ENCOUNTER (OUTPATIENT)
Dept: MAMMOGRAPHY | Facility: HOSPITAL | Age: 85
Discharge: HOME | End: 2025-04-18
Payer: MEDICARE

## 2025-04-18 DIAGNOSIS — D64.9 ANEMIA, UNSPECIFIED TYPE: ICD-10-CM

## 2025-04-18 DIAGNOSIS — Z78.0 POSTMENOPAUSE: ICD-10-CM

## 2025-04-18 DIAGNOSIS — Z12.31 OTHER SCREENING MAMMOGRAM: ICD-10-CM

## 2025-04-18 DIAGNOSIS — C91.10 CLL (CHRONIC LYMPHOCYTIC LEUKEMIA): ICD-10-CM

## 2025-04-18 LAB
ACANTHOCYTES BLD QL SMEAR: ABNORMAL
ALBUMIN SERPL-MCNC: 4.4 G/DL (ref 3.5–5.2)
ALBUMIN/GLOB SERPL: 1.9 G/DL
ALP SERPL-CCNC: 118 U/L (ref 39–117)
ALT SERPL W P-5'-P-CCNC: 15 U/L (ref 1–33)
ANION GAP SERPL CALCULATED.3IONS-SCNC: 9.3 MMOL/L (ref 5–15)
AST SERPL-CCNC: 20 U/L (ref 1–32)
B2 MICROGLOB SERPL-MCNC: 5.1 MG/L (ref 0.8–2.2)
BILIRUB SERPL-MCNC: 0.3 MG/DL (ref 0–1.2)
BUN SERPL-MCNC: 30 MG/DL (ref 8–23)
BUN/CREAT SERPL: 20.5 (ref 7–25)
CALCIUM SPEC-SCNC: 9.1 MG/DL (ref 8.6–10.5)
CHLORIDE SERPL-SCNC: 108 MMOL/L (ref 98–107)
CO2 SERPL-SCNC: 21.7 MMOL/L (ref 22–29)
CREAT SERPL-MCNC: 1.46 MG/DL (ref 0.57–1)
DEPRECATED RDW RBC AUTO: 54.9 FL (ref 37–54)
EGFRCR SERPLBLD CKD-EPI 2021: 35.6 ML/MIN/1.73
ELLIPTOCYTES BLD QL SMEAR: ABNORMAL
ERYTHROCYTE [DISTWIDTH] IN BLOOD BY AUTOMATED COUNT: 15.5 % (ref 12.3–15.4)
FERRITIN SERPL-MCNC: 66.1 NG/ML (ref 13–150)
FOLATE SERPL-MCNC: 10.5 NG/ML (ref 4.78–24.2)
GLOBULIN UR ELPH-MCNC: 2.3 GM/DL
GLUCOSE SERPL-MCNC: 120 MG/DL (ref 65–99)
HCT VFR BLD AUTO: 40.1 % (ref 34–46.6)
HGB BLD-MCNC: 11.7 G/DL (ref 12–15.9)
HYPOCHROMIA BLD QL: ABNORMAL
IRON 24H UR-MRATE: 69 MCG/DL (ref 37–145)
IRON SATN MFR SERPL: 16 % (ref 20–50)
LDH SERPL-CCNC: 151 U/L (ref 135–214)
LYMPHOCYTES # BLD MANUAL: 26.01 10*3/MM3 (ref 0.7–3.1)
LYMPHOCYTES NFR BLD MANUAL: 2 % (ref 5–12)
MCH RBC QN AUTO: 27.9 PG (ref 26.6–33)
MCHC RBC AUTO-ENTMCNC: 29.2 G/DL (ref 31.5–35.7)
MCV RBC AUTO: 95.7 FL (ref 79–97)
MONOCYTES # BLD: 0.66 10*3/MM3 (ref 0.1–0.9)
NEUTROPHILS # BLD AUTO: 6.26 10*3/MM3 (ref 1.7–7)
NEUTROPHILS NFR BLD MANUAL: 19 % (ref 42.7–76)
PLAT MORPH BLD: NORMAL
PLATELET # BLD AUTO: 162 10*3/MM3 (ref 140–450)
PMV BLD AUTO: 12.8 FL (ref 6–12)
POTASSIUM SERPL-SCNC: 5.2 MMOL/L (ref 3.5–5.2)
PROT SERPL-MCNC: 6.7 G/DL (ref 6–8.5)
RBC # BLD AUTO: 4.19 10*6/MM3 (ref 3.77–5.28)
SCAN SLIDE: NORMAL
SMUDGE CELLS BLD QL SMEAR: ABNORMAL
SODIUM SERPL-SCNC: 139 MMOL/L (ref 136–145)
STOMATOCYTES BLD QL SMEAR: ABNORMAL
TIBC SERPL-MCNC: 441 MCG/DL (ref 298–536)
TRANSFERRIN SERPL-MCNC: 296 MG/DL (ref 200–360)
VARIANT LYMPHS NFR BLD MANUAL: 79 % (ref 19.6–45.3)
VIT B12 BLD-MCNC: 361 PG/ML (ref 211–946)
WBC NRBC COR # BLD AUTO: 32.93 10*3/MM3 (ref 3.4–10.8)

## 2025-04-18 PROCEDURE — 36415 COLL VENOUS BLD VENIPUNCTURE: CPT

## 2025-04-18 PROCEDURE — 80053 COMPREHEN METABOLIC PANEL: CPT

## 2025-04-18 PROCEDURE — 84466 ASSAY OF TRANSFERRIN: CPT

## 2025-04-18 PROCEDURE — 82728 ASSAY OF FERRITIN: CPT

## 2025-04-18 PROCEDURE — 83521 IG LIGHT CHAINS FREE EACH: CPT

## 2025-04-18 PROCEDURE — 84165 PROTEIN E-PHORESIS SERUM: CPT

## 2025-04-18 PROCEDURE — 77080 DXA BONE DENSITY AXIAL: CPT

## 2025-04-18 PROCEDURE — 82784 ASSAY IGA/IGD/IGG/IGM EACH: CPT

## 2025-04-18 PROCEDURE — 82607 VITAMIN B-12: CPT

## 2025-04-18 PROCEDURE — 85025 COMPLETE CBC W/AUTO DIFF WBC: CPT

## 2025-04-18 PROCEDURE — 83615 LACTATE (LD) (LDH) ENZYME: CPT

## 2025-04-18 PROCEDURE — 85007 BL SMEAR W/DIFF WBC COUNT: CPT

## 2025-04-18 PROCEDURE — 82746 ASSAY OF FOLIC ACID SERUM: CPT

## 2025-04-18 PROCEDURE — 82232 ASSAY OF BETA-2 PROTEIN: CPT

## 2025-04-18 PROCEDURE — 77063 BREAST TOMOSYNTHESIS BI: CPT

## 2025-04-18 PROCEDURE — 77067 SCR MAMMO BI INCL CAD: CPT

## 2025-04-18 PROCEDURE — 83540 ASSAY OF IRON: CPT

## 2025-04-18 PROCEDURE — 86334 IMMUNOFIX E-PHORESIS SERUM: CPT

## 2025-04-23 LAB
ALBUMIN SERPL ELPH-MCNC: 3.5 G/DL (ref 2.9–4.4)
ALBUMIN/GLOB SERPL: 1.3 {RATIO} (ref 0.7–1.7)
ALPHA1 GLOB SERPL ELPH-MCNC: 0.3 G/DL (ref 0–0.4)
ALPHA2 GLOB SERPL ELPH-MCNC: 0.9 G/DL (ref 0.4–1)
B-GLOBULIN SERPL ELPH-MCNC: 1.1 G/DL (ref 0.7–1.3)
GAMMA GLOB SERPL ELPH-MCNC: 0.4 G/DL (ref 0.4–1.8)
GLOBULIN SER-MCNC: 2.7 G/DL (ref 2.2–3.9)
IGA SERPL-MCNC: 89 MG/DL (ref 64–422)
IGG SERPL-MCNC: 439 MG/DL (ref 586–1602)
IGM SERPL-MCNC: 17 MG/DL (ref 26–217)
IGVH RESULT: NORMAL
INTERPRETATION SERPL IEP-IMP: ABNORMAL
KAPPA LC FREE SER-MCNC: 24.6 MG/L (ref 3.3–19.4)
KAPPA LC FREE/LAMBDA FREE SER: 1.52 {RATIO} (ref 0.26–1.65)
LABORATORY COMMENT REPORT: ABNORMAL
LAMBDA LC FREE SERPL-MCNC: 16.2 MG/L (ref 5.7–26.3)
M PROTEIN SERPL ELPH-MCNC: ABNORMAL G/DL
PROT SERPL-MCNC: 6.2 G/DL (ref 6–8.5)

## 2025-04-25 LAB — TARGETGENE RESULT: NORMAL

## 2025-04-26 LAB — CCV RESULT: NORMAL

## 2025-05-20 NOTE — PROGRESS NOTES
HEMATOLOGY ONCOLOGY OUTPATIENT FOLLOW UP       Patient name: Diane Guan  : 1940  MRN: [id number]  Primary Care Physician: Asia Queen APRN  Referring Physician: No ref. provider found  Reason For Consult:       History of Present Illness:  Diane Guan is a 85 y.o. female who presented to [facility] on 3/11/2025 with complaints of fatigue and leukocytosis.  Past medical history of CLL, CKD, DM, hyperlipidemia, hypertension.  Patient reported she had been having an ongoing fatigue for 2 weeks.  She had an appointment with her PCP and her WBC was 38.6 so her PCP instructed her to come to the ED for evaluation due to her history of CLL.  In the ED her WBC was 34.48, creatinine 1.84 otherwise labs were unremarkable.  UA was positive for nitrites, moderate leukocytes, 4+ bacteria.  She was given IV Rocephin in the ED and the ED provider consulted hematology.  She was admitted for further treatment of her UTI.     25  Hematology/Oncology was consulted for leukocytosis in a patient with a history of CLL and current UTI.  Patient is known to us and has been seen previously by Dr. Bolden in our office for her diagnoses of stage II right breast cancer for which she underwent right mastectomy, ER, SD positive HER2/juan luis negative CLL diagnosed in 2017 during a workup for persistent leukocytosis.  Oncological history per the chart as follows:     Chronic lymphocytic leukemia clinical stage 0 diagnosed in  on peripheral blood flow cytometry.  Was ordered due to persistent leukocytosis with differential lymphocytosis patient had CT scans which do not show any significant lymphadenopathy at that time and therefore she was observed.  Follow-up CT scans completed in 2022 showed no evidence of progressive lymphadenopathy.  Also clinical stage II right breast cancer status post right mastectomy treated with Arimidex that was initiated in .  Patient  has been lost to follow-up in our office since 9/15/2023.  She was seen during a previous hospitalization in November 2023.    She was last seen in our office in 2019     Recently she had a CBC done which showed persistent leukocytosis with white count ranging from 15-30,000, she has anemia with hemoglobin of 11.4, normal MCV and normal platelets.  She has differential lymphocytosis noted on her counts.  On her chemistry panel she has a creatinine that ranges between 0.9-1.34        Breast Cancer history:  This is an 83-year-old female with a history of stage II right breast cancer for which she underwent right mastectomy.  Tumor was invasive well-differentiated ductal carcinoma, estrogen receptor +100%, progesterone receptor +100% and HER2/juan luis was negative.  Patient has been on anastrozole since 2019 prescribed by Dr. Palmer.    Patient has discontinued AI therapy. She states she is beginning to feel better since admission       Over the past several years she has had progressive decline in her functionality.  She sits or lays down more than half of the time at home.  She was able to complete her daily activities.  She has significant night sweats but no weight loss.  If anything she is beginning to gain weight.   Patient is  and lives with her spouse.  She is accompanied today by her daughter for this appointment.     9/16/2022 patient had left screening mammogram which was essentially negative.  Follow-up in 1 year was recommended  9/16/2022 patient had CT scan of the chest, abdomen and pelvis there was no pathologically enlarged lymph nodes within the chest.  There was no pathologically enlarged lymph nodes within the abdomen or pelvis.  She has normal  spleen size    4/10/25: The patient presents for evaluation of chronic lymphocytic leukemia, breast cancer, anemia, and kidney disease. She is accompanied by her daughter.    She has a history of right breast cancer, for which she underwent a mastectomy  approximately 6 years ago. Post-surgery, she was on hormone therapy for 5 years, which she tolerated well. She was not advised to continue the hormone therapy beyond this period. A mammogram and bone density test are scheduled for 04/18/2025 at [facility]. She reports no history of osteoporosis and does not take any calcium or vitamin D supplements.    She has been diagnosed with chronic lymphocytic leukemia (CLL) for several years but has not received any treatment for it. During her last consultation with Dr. Bolden, she was informed that her CLL status remained unchanged and was scheduled for a follow-up in 6 months. She has not undergone any laboratory tests since her hospital discharge in March 2025. She reports no changes in appetite, weight loss, night sweats, or palpable masses.    She has a history of DVT, with the most recent episode occurring in 10/2023. She has been intermittently on Eliquis 5 mg since then, but due to financial constraints, she has been off the medication for the past few days. She has not been on any other anticoagulants.    She also has CKD stage 3.  She was recently admitted to [facility] for UTI as above, She has completed antibiotic therapy and denied any new symptoms.    She has been on an iron supplement 3 times a week for the past 5 years. She has not had her iron levels checked recently.    Supplemental Information  She has been under the care of Dr. Bolden and was recently hospitalized due to a urinary tract infection (UTI). Since her discharge a month ago, she has been faring well, although she experienced a fall at home. She reports intermittent leg weakness and foot numbness, which limit her mobility. She has received injections in both knees, with the right knee being more problematic, but these have not provided relief. She has been receiving occupational and physical therapy at home post-hospitalization, but she is reluctant to continue due to her age and financial  constraints. She is diabetic.    MEDICATIONS  Current: Eliquis, iron supplement      SUBJECTIVE:  5/22/25; Pt seen for follow up visit. No acute complaints. Has chronic fatigue and gait instability which remains unchanged.on oral iron supplementation with good compliance.     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/2019    Depression     Diabetes mellitus     Disease of thyroid gland     Diverticulosis of colon 2018    Identified on colonoscopy    DVT (deep venous thrombosis)     Dysphagia 12/2020    Dyspnea on exertion     Fall at home 10/11/2023    Hemorrhoid     Hiatal hernia 12/2020    Hiatal hernia with GERD     large hiatal hernia with high-grade reflux on barium swallow; s/p laparoscopic fundoplication with gastropexy    History of breast cancer 10/11/2023    History of cigarette smoking 10/11/2023    History of diabetes mellitus     no meds now    HL (hearing loss)     Hyperlipidemia     Hypertension     Knee swelling     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    aspiration pneumonia; completed abx therapy    OAB (overactive bladder)     Personal history of CLL (chronic lymphocytic leukemia) 10/11/2023    Pulmonary emboli     Skin cancer     Sleep apnea     daughter states pt does not have and doesn't have machine       Past Surgical History:   Procedure Laterality Date    BLADDER SURGERY      BREAST BIOPSY      BRONCHOSCOPY N/A 09/13/2019    Procedure: BRONCHOSCOPY with bronchial washing;  Surgeon: Marnie Frankel MD;  Location: [facility] ENDOSCOPY;  Service: Pulmonary    BRONCHOSCOPY Bilateral 10/18/2023    Procedure: BRONCHOSCOPY AT BEDSIDE;  Surgeon: Vinicius Heredia DO;  Location: [facility] ENDOSCOPY;  Service: Pulmonary;  Laterality: Bilateral;    COLONOSCOPY  07/19/2018    severe  diverticulosis    CYST REMOVAL      Removed a fatty cyst off of her back    ENDOSCOPY N/A 11/23/2020    Procedure: ESOPHAGOGASTRODUODENOSCOPY with dilatation (Bougie # 48, 50, 52, 54, 56, 58);  Surgeon: Den Plummer DO;  Location: [facility] ENDOSCOPY;  Service: General;  Laterality: N/A;  Post: large hiatal hernia, joshua's ulcers    EYE SURGERY      HERNIA REPAIR      HIATAL HERNIA REPAIR N/A 12/30/2020    Procedure: Laparoscopic hiatal hernia repair with gastropexy;  Surgeon: Den Plummer DO;  Location: [facility] MAIN OR;  Service: General;  Laterality: N/A;    MASTECTOMY Right 02/04/2019    Invasive ductal carcinoma    TUBAL ABDOMINAL LIGATION           Current Outpatient Medications:     Accu-Chek Softclix Lancets lancets, 1 each by Other route Daily. Use to monitor blood sugar once daily, Disp: 100 each, Rfl: 0    Alcohol Swabs ([employer] Alcohol Prep Pads) 70 % pads, APPLY 1 EACH TOPICALLY DAILY., Disp: , Rfl:     amLODIPine (NORVASC) 10 MG tablet, Take 1 tablet by mouth Daily. for high blood pressure, Disp: 90 tablet, Rfl: 1    apixaban (ELIQUIS) 5 MG tablet tablet, Take 1 tablet by mouth 2 (Two) Times a Day. (Patient not taking: Reported on 4/10/2025), Disp: 180 tablet, Rfl: 3    Blood Glucose Monitoring Suppl (Accu-Chek Guide) w/Device kit, Use 1 kit Daily. Use to monitor blood sugar once daily, Disp: 1 kit, Rfl: 0    docusate sodium (COLACE) 100 MG capsule, Take 1 capsule by mouth 2 (Two) Times a Day. Indications: Constipation, Disp: , Rfl: 3    ferrous sulfate 324 (65 Fe) MG tablet delayed-release EC tablet, Take 1 tablet by mouth 3 (Three) Times a Week. Indications: Iron Deficiency, Disp: , Rfl:     Hearing Aid Batteries misc, 1 Units Every 7 (Seven) Days., Disp: 52 each, Rfl: 0    levothyroxine (SYNTHROID, LEVOTHROID) 100 MCG tablet, TAKE 1 TABLET BY MOUTH EVERY DAY, Disp: 90 tablet, Rfl: 3    losartan (Cozaar) 50 MG tablet, Take 1 tablet by mouth Daily., Disp: 30 tablet, Rfl: 3    metFORMIN (GLUCOPHAGE)  500 MG tablet, TAKE 1 TABLET BY MOUTH EVERY DAY WITH BREAKFAST, Disp: 90 tablet, Rfl: 3    metoclopramide (REGLAN) 5 MG tablet, TAKE 1 TABLET BY MOUTH 3 (THREE) TIMES A DAY AS NEEDED (FOR NAUSEA AND VOMITING)., Disp: 270 tablet, Rfl: 2    mirtazapine (REMERON) 15 MG tablet, TAKE 1 TABLET BY MOUTH EVERYDAY AT BEDTIME, Disp: 90 tablet, Rfl: 1    pantoprazole (PROTONIX) 40 MG EC tablet, TAKE 1 TABLET BY MOUTH EVERY MORNING BEFORE BREAKFAST. TAKE ON AN EMPTY STOMACH., Disp: 90 tablet, Rfl: 1    rOPINIRole (REQUIP) 0.25 MG tablet, TAKE 1 TABLET BY MOUTH DAILY AT NIGHT 1 HOUR BEFORE BEDTIME, Disp: 90 tablet, Rfl: 3    rosuvastatin (CRESTOR) 10 MG tablet, TAKE 1 TABLET BY MOUTH EVERY DAY AT NIGHT, Disp: 90 tablet, Rfl: 3    sertraline (ZOLOFT) 100 MG tablet, TAKE 1 AND 1/2 TABLETS BY MOUTH EVERY DAY, Disp: 135 tablet, Rfl: 3    No Known Allergies    Family History   Problem Relation Age of Onset    Diabetes Mother     Arthritis Other     Heart disease Other     Arthritis Father     Hyperlipidemia Father     Miscarriages / Stillbirths Daughter        Cancer-related family history is not on file.      Social History     Tobacco Use    Smoking status: Former     Current packs/day: 0.00     Average packs/day: 0.5 packs/day for 2.0 years (1.0 ttl pk-yrs)     Types: Cigarettes     Start date: 1990     Quit date: 1992     Years since quittin.4    Smokeless tobacco: Never   Vaping Use    Vaping status: Never Used   Substance Use Topics    Alcohol use: No    Drug use: No     Social History     Social History Narrative    Not on file       ROS:   Review of Systems   Constitutional:  Positive for fatigue.   Musculoskeletal:  Positive for arthralgias and gait problem.         Objective:    Vital Signs:  There were no vitals filed for this visit.    There is no height or weight on file to calculate BMI.    ECOG  (2) Ambulatory and capable of self care, unable to carry out work activity, up and about > 50% or waking  hours    Physical Exam:   Physical Exam  Constitutional:       Appearance: Normal appearance. She is normal weight.   HENT:      Head: Normocephalic and atraumatic.      Right Ear: External ear normal.      Left Ear: External ear normal.      Nose: Nose normal.      Mouth/Throat:      Mouth: Mucous membranes are moist.      Pharynx: Oropharynx is clear.   Eyes:      Extraocular Movements: Extraocular movements intact.      Conjunctiva/sclera: Conjunctivae normal.      Pupils: Pupils are equal, round, and reactive to light.   Cardiovascular:      Rate and Rhythm: Normal rate.      Pulses: Normal pulses.   Pulmonary:      Effort: Pulmonary effort is normal.   Abdominal:      General: Abdomen is flat.      Palpations: Abdomen is soft.   Musculoskeletal:         General: Normal range of motion.      Cervical back: Normal range of motion and neck supple.   Skin:     General: Skin is warm.   Neurological:      Mental Status: She is alert.   Psychiatric:         Mood and Affect: Mood normal.         Behavior: Behavior normal.         Thought Content: Thought content normal.         Judgment: Judgment normal.         Lab Results - Last 18 Months   Lab Units 04/18/25  0955 03/14/25  0635 03/13/25  0426   WBC 10*3/mm3 32.93* 24.28* 27.26*   HEMOGLOBIN g/dL 11.7* 10.0* 10.0*   HEMATOCRIT % 40.1 35.4 34.4   PLATELETS 10*3/mm3 162 119* 125*   MCV fL 95.7 96.5 93.7     Lab Results - Last 18 Months   Lab Units 04/18/25  0955 03/17/25  1131 03/14/25  0453 03/12/25  0948 03/12/25  0155 03/11/25  1331   SODIUM mmol/L 139 141 139   < > 140 141   POTASSIUM mmol/L 5.2 4.3 4.5   < > 4.2 4.5   CHLORIDE mmol/L 108* 109* 112*   < > 107 108*   CO2 mmol/L 21.7* 18.0* 18.2*   < > 18.0* 20.6*   BUN mg/dL 30* 19 26*   < > 44* 40*   CREATININE mg/dL 1.46* 1.20* 1.19*   < > 1.84* 1.64*   CALCIUM mg/dL 9.1 9.3 8.2*   < > 8.8 9.2   BILIRUBIN mg/dL 0.3  --   --   --  0.3 0.3   ALK PHOS U/L 118*  --   --   --  109 115   ALT (SGPT) U/L 15  --   --   --  " 10 7   AST (SGOT) U/L 20  --   --   --  19 13   GLUCOSE mg/dL 120* 110* 137*   < > 163* 103*    < > = values in this interval not displayed.       Lab Results   Component Value Date    GLUCOSE 120 (H) 04/18/2025    BUN 30 (H) 04/18/2025    CREATININE 1.46 (H) 04/18/2025    EGFRIFNONA 38 (L) 10/28/2021    EGFRIFAFRI 59 (L) 04/27/2017    BCR 20.5 04/18/2025    K 5.2 04/18/2025    CO2 21.7 (L) 04/18/2025    CALCIUM 9.1 04/18/2025    ALBUMIN 4.4 04/18/2025    ALBUMIN 3.5 04/18/2025    AST 20 04/18/2025    ALT 15 04/18/2025       No results for input(s): \"APTT\", \"INR\", \"PTT\" in the last 74983 hours.      Lab Results   Component Value Date    IRON 69 04/18/2025    TIBC 441 04/18/2025    FERRITIN 66.10 04/18/2025       Lab Results   Component Value Date    FOLATE 10.50 04/18/2025       No results found for: \"OCCULTBLD\"    Lab Results   Component Value Date    RETICCTPCT 1.77 08/31/2022     Lab Results   Component Value Date    MSGMDKLV13 361 04/18/2025     No results found for: \"SPEP\", \"UPEP\"  LDH   Date Value Ref Range Status   04/18/2025 151 135 - 214 U/L Final     Uric Acid   Date Value Ref Range Status   09/15/2023 5.9 (H) 2.4 - 5.7 mg/dL Final     Lab Results   Component Value Date    ZEB Negative 10/19/2023    SEDRATE 19 03/12/2025     Lab Results   Component Value Date    HAPTOGLOBIN 472 (H) 10/29/2023     Lab Results   Component Value Date    PTT 23.6 (L) 12/18/2020    INR 1.05 10/20/2023     No results found for: \"\"  No results found for: \"CEA\"  No components found for: \"CA-19-9\"  No results found for: \"PSA\"  Lab Results   Component Value Date    SEDRATE 19 03/12/2025          Assessment & Plan :      Chronic lymphocytic leukemia: First diagnosed in 2017.    CLL-IPI Intermediate Risk Disease;  - She has not required any treatment since her diagnosis.  WBC currently 30.20 down from 38.96 with the initiation of IV antibiotics for treatment of her UTI.  Baseline WBC over the past months 20-30,000.  Recent labs " from hospital admission reviewed, WBC count near baseline on discharge at 24K.  -reviewed CBC, WBC count around 26K and ALC around 19K, these labs are stable since 2022 with some fluctuations.  -reviewed treatment indications with patient. No indications at this time.    History of stage II right breast cancer status post right mastectomy.  ER positive, FL positive, HER2/juan luis negative.    -Previously on Arimidex that was started in 2019.    Patient self discontinued Arimidex  after 5 years of therapy    3. Surveillance: mammogram in April 2025 Reported benign (BIRADS 2), Repeat in a year    4. Bone health: DEXA Scan consistent with Osteopenia. Advised pt to start taking oral Vit D-Calcium supplement.    UTI:   -Patient has completed oral antibiotic therapy, no recurrent symptoms presently    History of pulmonary emboli/acute left lower extremity DVT: Diagnosed in July 2024.    - She is currently on On Eliquis 5mg BID, has financial issues, Will request Financial counsellor evaluation for this.  -candidate for indefinite anticoagulation, Will prefer DOAC therapy over warfarin given her fall risk and risk of bleeding,  -given high fall risk, will dose reduce eliquis to 2.5mg BID.      Mild macrocytic anemia:   History of Iron deficiency: on oral iron  - Hemoglobin 11.3 g/dL, .5. during hospital admission  -Will recheck CBC, iron panel, b12, Folate, BAL, SPEP and FLC assay  -anemia is likely contributed by underlying CKD, DM and recent infection as well.   -iron panel reviewed, S. Ferritin 66 and TSAT 16%, in CKD, this is consistent with MINA  -plan for IV Venofer 300mg X  3 weekly doses given Iron deficiency anemia, ongoing fatigeu despite oral iron therapy indicating poor oral iron absorption.    8. Low normal b12 levels: Started oral B12 supplementation.      9. Frequent falls: she has undergone Physical therapy before, does not wish to pursue it again at this time, Will recommend fall precautions and using  walking assisting device (cane/walker).     4  month follow up with labs, sooner as needed.    Thank you very much for providing the opportunity to participate in this patient’s care. Please do not hesitate to call if there are any other questions.

## 2025-05-22 ENCOUNTER — TELEPHONE (OUTPATIENT)
Dept: ONCOLOGY | Facility: CLINIC | Age: 85
End: 2025-05-22
Payer: MEDICARE

## 2025-05-22 ENCOUNTER — OFFICE VISIT (OUTPATIENT)
Dept: ONCOLOGY | Facility: CLINIC | Age: 85
End: 2025-05-22
Payer: MEDICARE

## 2025-05-22 VITALS
BODY MASS INDEX: 29.84 KG/M2 | WEIGHT: 168.4 LBS | DIASTOLIC BLOOD PRESSURE: 76 MMHG | HEART RATE: 76 BPM | SYSTOLIC BLOOD PRESSURE: 147 MMHG | HEIGHT: 63 IN | OXYGEN SATURATION: 90 %

## 2025-05-22 DIAGNOSIS — Z17.0 MALIGNANT NEOPLASM OF CENTRAL PORTION OF RIGHT BREAST IN FEMALE, ESTROGEN RECEPTOR POSITIVE: ICD-10-CM

## 2025-05-22 DIAGNOSIS — D64.9 ANEMIA, UNSPECIFIED TYPE: Primary | ICD-10-CM

## 2025-05-22 DIAGNOSIS — C50.111 MALIGNANT NEOPLASM OF CENTRAL PORTION OF RIGHT BREAST IN FEMALE, ESTROGEN RECEPTOR POSITIVE: ICD-10-CM

## 2025-05-22 DIAGNOSIS — C50.911 MALIGNANT NEOPLASM OF RIGHT BREAST IN FEMALE, ESTROGEN RECEPTOR POSITIVE, UNSPECIFIED SITE OF BREAST: ICD-10-CM

## 2025-05-22 DIAGNOSIS — D50.9 IRON DEFICIENCY ANEMIA, UNSPECIFIED IRON DEFICIENCY ANEMIA TYPE: ICD-10-CM

## 2025-05-22 DIAGNOSIS — N18.30 STAGE 3 CHRONIC KIDNEY DISEASE, UNSPECIFIED WHETHER STAGE 3A OR 3B CKD: ICD-10-CM

## 2025-05-22 DIAGNOSIS — Z17.0 MALIGNANT NEOPLASM OF RIGHT BREAST IN FEMALE, ESTROGEN RECEPTOR POSITIVE, UNSPECIFIED SITE OF BREAST: ICD-10-CM

## 2025-05-22 DIAGNOSIS — C91.10 CLL (CHRONIC LYMPHOCYTIC LEUKEMIA): ICD-10-CM

## 2025-05-22 DIAGNOSIS — C91.10 CHRONIC LYMPHOID LEUKEMIA: ICD-10-CM

## 2025-05-22 PROBLEM — K90.9 MALABSORPTION OF IRON: Status: ACTIVE | Noted: 2025-05-22

## 2025-05-22 NOTE — TELEPHONE ENCOUNTER
Attempted to reach pt to schedule iron infusions.  No answer, phone just rang and rang then disconnected.

## 2025-05-25 DIAGNOSIS — F32.A ANXIETY AND DEPRESSION: ICD-10-CM

## 2025-05-25 DIAGNOSIS — F41.9 ANXIETY AND DEPRESSION: ICD-10-CM

## 2025-05-25 DIAGNOSIS — E78.2 MIXED HYPERLIPIDEMIA: ICD-10-CM

## 2025-05-25 DIAGNOSIS — F41.8 MIXED ANXIETY AND DEPRESSIVE DISORDER: ICD-10-CM

## 2025-05-27 RX ORDER — SERTRALINE HYDROCHLORIDE 100 MG/1
150 TABLET, FILM COATED ORAL DAILY
Qty: 135 TABLET | Refills: 3 | Status: SHIPPED | OUTPATIENT
Start: 2025-05-27

## 2025-05-27 RX ORDER — MIRTAZAPINE 15 MG/1
15 TABLET, FILM COATED ORAL
Qty: 90 TABLET | Refills: 1 | Status: SHIPPED | OUTPATIENT
Start: 2025-05-27

## 2025-05-27 RX ORDER — ROSUVASTATIN CALCIUM 10 MG/1
10 TABLET, COATED ORAL
Qty: 90 TABLET | Refills: 3 | Status: SHIPPED | OUTPATIENT
Start: 2025-05-27

## 2025-06-04 ENCOUNTER — TELEPHONE (OUTPATIENT)
Dept: ONCOLOGY | Facility: HOSPITAL | Age: 85
End: 2025-06-04
Payer: MEDICARE

## 2025-06-04 NOTE — TELEPHONE ENCOUNTER
Attempted to call pt to see if she could come in Thursday rather than Friday this week due to pt load. No answer

## 2025-06-06 ENCOUNTER — HOSPITAL ENCOUNTER (OUTPATIENT)
Dept: ONCOLOGY | Facility: HOSPITAL | Age: 85
Discharge: HOME | End: 2025-06-06
Payer: MEDICARE

## 2025-06-06 VITALS
HEIGHT: 63 IN | SYSTOLIC BLOOD PRESSURE: 159 MMHG | TEMPERATURE: 97.7 F | DIASTOLIC BLOOD PRESSURE: 75 MMHG | HEART RATE: 75 BPM | BODY MASS INDEX: 29.59 KG/M2 | RESPIRATION RATE: 16 BRPM | WEIGHT: 167 LBS | OXYGEN SATURATION: 93 %

## 2025-06-06 DIAGNOSIS — K90.9 MALABSORPTION OF IRON: Primary | ICD-10-CM

## 2025-06-06 DIAGNOSIS — D50.9 IRON DEFICIENCY ANEMIA, UNSPECIFIED IRON DEFICIENCY ANEMIA TYPE: ICD-10-CM

## 2025-06-06 PROCEDURE — 25010000002 IRON SUCROSE PER 1 MG: Performed by: STUDENT IN AN ORGANIZED HEALTH CARE EDUCATION/TRAINING PROGRAM

## 2025-06-06 PROCEDURE — 25810000003 SODIUM CHLORIDE 0.9 % SOLUTION: Performed by: STUDENT IN AN ORGANIZED HEALTH CARE EDUCATION/TRAINING PROGRAM

## 2025-06-06 PROCEDURE — 96365 THER/PROPH/DIAG IV INF INIT: CPT

## 2025-06-06 RX ORDER — FAMOTIDINE 10 MG/ML
20 INJECTION, SOLUTION INTRAVENOUS AS NEEDED
OUTPATIENT
Start: 2025-06-20

## 2025-06-06 RX ORDER — HYDROCORTISONE SODIUM SUCCINATE 100 MG/2ML
100 INJECTION INTRAMUSCULAR; INTRAVENOUS AS NEEDED
Status: CANCELLED | OUTPATIENT
Start: 2025-06-06

## 2025-06-06 RX ORDER — FAMOTIDINE 10 MG/ML
20 INJECTION, SOLUTION INTRAVENOUS AS NEEDED
Status: CANCELLED | OUTPATIENT
Start: 2025-06-06

## 2025-06-06 RX ORDER — FAMOTIDINE 10 MG/ML
20 INJECTION, SOLUTION INTRAVENOUS AS NEEDED
Status: CANCELLED | OUTPATIENT
Start: 2025-06-13

## 2025-06-06 RX ORDER — SODIUM CHLORIDE 9 MG/ML
20 INJECTION, SOLUTION INTRAVENOUS ONCE
Status: CANCELLED | OUTPATIENT
Start: 2025-06-13

## 2025-06-06 RX ORDER — DIPHENHYDRAMINE HYDROCHLORIDE 50 MG/ML
50 INJECTION, SOLUTION INTRAMUSCULAR; INTRAVENOUS AS NEEDED
Status: CANCELLED | OUTPATIENT
Start: 2025-06-13

## 2025-06-06 RX ORDER — DIPHENHYDRAMINE HYDROCHLORIDE 50 MG/ML
50 INJECTION, SOLUTION INTRAMUSCULAR; INTRAVENOUS AS NEEDED
Status: CANCELLED | OUTPATIENT
Start: 2025-06-06

## 2025-06-06 RX ORDER — SODIUM CHLORIDE 9 MG/ML
20 INJECTION, SOLUTION INTRAVENOUS ONCE
Status: DISCONTINUED | OUTPATIENT
Start: 2025-06-06 | End: 2025-06-07 | Stop reason: HOSPADM

## 2025-06-06 RX ORDER — HYDROCORTISONE SODIUM SUCCINATE 100 MG/2ML
100 INJECTION INTRAMUSCULAR; INTRAVENOUS AS NEEDED
OUTPATIENT
Start: 2025-06-20

## 2025-06-06 RX ORDER — DIPHENHYDRAMINE HYDROCHLORIDE 50 MG/ML
50 INJECTION, SOLUTION INTRAMUSCULAR; INTRAVENOUS AS NEEDED
OUTPATIENT
Start: 2025-06-20

## 2025-06-06 RX ORDER — SODIUM CHLORIDE 9 MG/ML
20 INJECTION, SOLUTION INTRAVENOUS ONCE
OUTPATIENT
Start: 2025-06-20

## 2025-06-06 RX ORDER — HYDROCORTISONE SODIUM SUCCINATE 100 MG/2ML
100 INJECTION INTRAMUSCULAR; INTRAVENOUS AS NEEDED
Status: CANCELLED | OUTPATIENT
Start: 2025-06-13

## 2025-06-06 RX ADMIN — IRON SUCROSE 300 MG: 20 INJECTION, SOLUTION INTRAVENOUS at 08:14

## 2025-06-06 NOTE — PROGRESS NOTES
Pt to the clinic for Venofer.  PIV obtained.  Treatment given and tolerated well. AVS given prior to discharge.

## 2025-06-13 ENCOUNTER — HOSPITAL ENCOUNTER (OUTPATIENT)
Dept: ONCOLOGY | Facility: HOSPITAL | Age: 85
Discharge: HOME | End: 2025-06-13
Payer: MEDICARE

## 2025-06-13 VITALS
HEIGHT: 63 IN | BODY MASS INDEX: 29.64 KG/M2 | SYSTOLIC BLOOD PRESSURE: 156 MMHG | DIASTOLIC BLOOD PRESSURE: 75 MMHG | WEIGHT: 167.3 LBS | OXYGEN SATURATION: 91 % | RESPIRATION RATE: 14 BRPM | HEART RATE: 65 BPM | TEMPERATURE: 97 F

## 2025-06-13 DIAGNOSIS — D50.9 IRON DEFICIENCY ANEMIA, UNSPECIFIED IRON DEFICIENCY ANEMIA TYPE: ICD-10-CM

## 2025-06-13 DIAGNOSIS — K90.9 MALABSORPTION OF IRON: Primary | ICD-10-CM

## 2025-06-13 PROCEDURE — 25810000003 SODIUM CHLORIDE 0.9 % SOLUTION: Performed by: STUDENT IN AN ORGANIZED HEALTH CARE EDUCATION/TRAINING PROGRAM

## 2025-06-13 PROCEDURE — 25010000002 IRON SUCROSE PER 1 MG: Performed by: STUDENT IN AN ORGANIZED HEALTH CARE EDUCATION/TRAINING PROGRAM

## 2025-06-13 PROCEDURE — 96365 THER/PROPH/DIAG IV INF INIT: CPT

## 2025-06-13 PROCEDURE — 96366 THER/PROPH/DIAG IV INF ADDON: CPT

## 2025-06-13 RX ORDER — SODIUM CHLORIDE 9 MG/ML
20 INJECTION, SOLUTION INTRAVENOUS ONCE
Status: DISCONTINUED | OUTPATIENT
Start: 2025-06-13 | End: 2025-06-14 | Stop reason: HOSPADM

## 2025-06-13 RX ADMIN — IRON SUCROSE 300 MG: 20 INJECTION, SOLUTION INTRAVENOUS at 08:20

## 2025-06-13 NOTE — PROGRESS NOTES
INPATIENT PROGRESS NOTE    Chrissy Singer is a 52 year old male    COMPLAINTS:  S/p anterior thoracic 5-6 corpectomy, thoracic 4-7 fusion, laminectomy, spine lumbar multiple laser with fusion in his mentation done on 4/1/2024.      SUBJECTIVE:  Denies any new complaints.  Says that he feels well.  No chest pain or pressure.  No shortness of breath.  No new numbness or weakness in the extremities.  Having regular bowel movements.  Denies any diarrhea.  Afebrile.  Wondering when he would be able to go to acute rehab    BRIEF HISTORY:  Chrissy Singer is a 52 year old male with past medical history of CAD s/p CABG in 2021, paroxysmal A-fib, HLD, DM with neuropathy, IDDM, insulin pump, history of kidney transplantation in 2014, history of T5 and T6 compression with osteomyelitis, patient had MRI done at [facility] service that show evidence of vertebral osteomyelitis/discitis at the T5-T6 levels.  Patient is now S/p anterior T5-T6 corpectomy, with spinal cord decompression, T4-T7 fusion, anterior T5-T6 vertebral body replacement, evacuation of multiple abscesses, evacuation of epidural abscess, allograft bone graft placement, anterior spinal instrumentation.        DAILY EVENTS:  4/6 stable. Pain a bit better. No new neuro. Crt 0.99. PT OT. Moves all 4. Leukocytosis resolving.  4/7 Hb 7.6, Crt 1.05. No new CO. Pain is a little better.  PT and OT evals P. He seems very limited. May need PMR eval. Cxs NGTD. Awaits transfer to the floor.  4/8 feels very weak. He and wife asked for PMR eval for [facility]. He remains challenged. IV abx. No F C rigors.  4/9 no new CO.Pursuing [facility] per pt request. He had favorable PMR eval. Able to do a little more. Can transfer with bed. Crt 0.96. BS ok.  4/10 stable, out of ICU. Referral made to [facility]. He has asked if he could go to [facility]s if possible. DW CM. No new neuro. IV ABX. Crt 1.05. Can DC wit AIR bed.  4/11/2023: Insurance denying acute inpatient rehab.  Will do a peer to  Pt to the clinic for Venofer.  PIV obtained.  Treatment given and tolerated well. Pt observed for 30 min .Pt noted she has high blood pressure, post vitals taken and b/p elevated.B/P recheck and within normal limits. PIV removed.Pt discharged with daughter and declined AVS.      peer tomorrow.  Already been accepted by inpatient rehab only here.  If insurance approved will transfer over there..  Otherwise we will have to find an alternative disposition plan.  Operative wound culture from 4/1/2024 still negative.  Final results in 14 days.  On IV cefepime and IV daptomycin with expected end of treatment 5/28/2024.  4/12/2024: Spoke with insurance liaison  , She approves acute inpatient rehab.  Leukocytosis noted.  Per infectious disease, cleared for discharge to [facility].  Per my discussion with , they want the white count to be repeated tomorrow prior to accepting him.  I spoke with Dr. Garcia, does not need to be in the hospital.  Continue IV cefepime and IV daptomycin till 5/28/2024.  Med rec completed.  4/13/2024: Vital stable.  Afebrile.  White count increased again.  Now up to 17.9.  Need to show downward trend prior to him going to [facility]    OBJECTIVE:  VITALS:  Visit Vitals  /73   Pulse 87   Temp 98.4 °F (36.9 °C) (Oral)   Resp 18   Ht 6' 2\" (1.88 m)   Wt 115.1 kg (253 lb 12 oz)   SpO2 93%   BMI 32.58 kg/m²       Intake/Output Summary (Last 24 hours) at 4/13/2024 0812  Last data filed at 4/13/2024 0600  Gross per 24 hour   Intake 360 ml   Output 1870 ml   Net -1510 ml     Weight: 115.1 kg (253 lb 12 oz)    PHYSICAL EXAM:  Lying in the bed alert oriented pleasant to talk to  HEENT exam normocephalic atraumatic   Oral mucosa is moist no thrush is noted  Neck is supple  Chest is clear to auscultation bilaterally good air entry bilaterally.No wheeze or rhonchi.No crackles .  Heart S1 and S2 regular.No murmurs heart. No S3-S4 heard PMI is normal.  No left ventricular heave.  Abdomen is soft nontender nondistended positive bowel sounds   Back examination shows no CVA or spinal tenderness.  Extremity shows no pedal edema positive peripheral pulses.  No calf tenderness.  Neurological examination alert oriented .  Moves all 4  extremities.          LABS  Recent Results (from the past 24 hour(s))   GLUCOSE, BEDSIDE - POINT OF CARE    Collection Time: 04/12/24 11:02 AM   Result Value Ref Range    GLUCOSE, BEDSIDE - POINT OF CARE 155 (H) 70 - 99 mg/dL   Urinalysis & Reflex Microscopy With Culture If Indicated    Collection Time: 04/12/24  3:59 PM   Result Value Ref Range    COLOR, URINALYSIS Yellow     APPEARANCE, URINALYSIS Clear     GLUCOSE, URINALYSIS Negative Negative mg/dL    BILIRUBIN, URINALYSIS Negative Negative    KETONES, URINALYSIS Negative Negative mg/dL    SPECIFIC GRAVITY, URINALYSIS 1.019 1.005 - 1.030    OCCULT BLOOD, URINALYSIS Large (A) Negative    PH, URINALYSIS 7.0 5.0 - 7.0    PROTEIN, URINALYSIS 300 (A) Negative mg/dL    UROBILINOGEN, URINALYSIS 2.0 (A) 0.2, 1.0 mg/dL    NITRITE, URINALYSIS Negative Negative    LEUKOCYTE ESTERASE, URINALYSIS Negative Negative    SQUAMOUS EPITHELIAL, URINALYSIS 1 to 5 None Seen, 1 to 5 /hpf    ERYTHROCYTES, URINALYSIS 11 to 25 (A) None Seen, 1 to 2 /hpf    LEUKOCYTES, URINALYSIS 1 to 5 None Seen, 1 to 5 /hpf    BACTERIA, URINALYSIS None Seen None Seen /hpf    HYALINE CASTS, URINALYSIS None Seen None Seen, 1 to 5 /lpf   GLUCOSE, BEDSIDE - POINT OF CARE    Collection Time: 04/12/24  4:55 PM   Result Value Ref Range    GLUCOSE, BEDSIDE - POINT OF CARE 104 (H) 70 - 99 mg/dL   GLUCOSE, BEDSIDE - POINT OF CARE    Collection Time: 04/12/24  8:33 PM   Result Value Ref Range    GLUCOSE, BEDSIDE - POINT OF CARE 129 (H) 70 - 99 mg/dL   Basic Metabolic Panel    Collection Time: 04/13/24  4:19 AM   Result Value Ref Range    Fasting Status      Sodium 135 135 - 145 mmol/L    Potassium 3.9 3.4 - 5.1 mmol/L    Chloride 101 97 - 110 mmol/L    Carbon Dioxide 28 21 - 32 mmol/L    Anion Gap 10 7 - 19 mmol/L    Glucose 73 70 - 99 mg/dL    BUN 13 6 - 20 mg/dL    Creatinine 1.03 0.67 - 1.17 mg/dL    Glomerular Filtration Rate 88 >=60    BUN/Cr 13 7 - 25    Calcium 8.5 8.4 - 10.2 mg/dL   CBC No Differential     Collection Time: 04/13/24  4:19 AM   Result Value Ref Range    WBC 17.9 (H) 4.2 - 11.0 K/mcL    RBC 3.10 (L) 4.50 - 5.90 mil/mcL    HGB 8.1 (L) 13.0 - 17.0 g/dL    HCT 26.1 (L) 39.0 - 51.0 %    MCV 84.2 78.0 - 100.0 fl    MCH 26.1 26.0 - 34.0 pg    MCHC 31.0 (L) 32.0 - 36.5 g/dL     (H) 140 - 450 K/mcL    RDW-CV 18.9 (H) 11.0 - 15.0 %    RDW-SD 55.9 (H) 39.0 - 50.0 fL    NRBC 0 <=0 /100 WBC   GLUCOSE, BEDSIDE - POINT OF CARE    Collection Time: 04/13/24  7:48 AM   Result Value Ref Range    GLUCOSE, BEDSIDE - POINT OF CARE 71 70 - 99 mg/dL       ASSESSMENT AND PLAN:  Thoracic vertebral abscesses with cord compression  S/P anterior T4-T7 fusion, T5 and T6 vertebral body replacement, evacuation of multiple abscesses  Acute blood loss anemia: Preoperative hemoglobin was 10.9.  Hemoglobin drifted down to 7.8.  Now seems to be stable.  Will continue to monitor hemoglobin hematocrit closely.  IDDM: On insulin pump.  Glycemic control is appropriate.  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  Creatinine peaked to 1.67 on 4/3/2024.  Baseline creatinine 1.4 per nephrology note.  History of kidney transplant.  On chronic immunosuppressant.  Being managed by nephrology team.  CAD/CABG done on 2/1/2021.  Had been seen by Dr. Lali Coulter in the past.  Chronic immunosuppression  HTN: Acceptable control.  Parox a fib  DVT prophylaxis heparin  Full code      Yandel Morel MD  4/13/2024

## 2025-06-20 ENCOUNTER — HOSPITAL ENCOUNTER (OUTPATIENT)
Dept: ONCOLOGY | Facility: HOSPITAL | Age: 85
Discharge: HOME | End: 2025-06-20
Payer: MEDICARE

## 2025-06-20 VITALS
WEIGHT: 167.5 LBS | SYSTOLIC BLOOD PRESSURE: 172 MMHG | BODY MASS INDEX: 28.6 KG/M2 | OXYGEN SATURATION: 93 % | HEART RATE: 80 BPM | DIASTOLIC BLOOD PRESSURE: 85 MMHG | RESPIRATION RATE: 18 BRPM | TEMPERATURE: 98 F | HEIGHT: 64 IN

## 2025-06-20 DIAGNOSIS — D50.9 IRON DEFICIENCY ANEMIA, UNSPECIFIED IRON DEFICIENCY ANEMIA TYPE: ICD-10-CM

## 2025-06-20 DIAGNOSIS — K90.9 MALABSORPTION OF IRON: Primary | ICD-10-CM

## 2025-06-20 PROCEDURE — 25010000002 IRON SUCROSE PER 1 MG: Performed by: STUDENT IN AN ORGANIZED HEALTH CARE EDUCATION/TRAINING PROGRAM

## 2025-06-20 PROCEDURE — 96366 THER/PROPH/DIAG IV INF ADDON: CPT

## 2025-06-20 PROCEDURE — 25810000003 SODIUM CHLORIDE 0.9 % SOLUTION: Performed by: STUDENT IN AN ORGANIZED HEALTH CARE EDUCATION/TRAINING PROGRAM

## 2025-06-20 PROCEDURE — 96365 THER/PROPH/DIAG IV INF INIT: CPT

## 2025-06-20 RX ORDER — SODIUM CHLORIDE 9 MG/ML
20 INJECTION, SOLUTION INTRAVENOUS ONCE
Status: DISCONTINUED | OUTPATIENT
Start: 2025-06-20 | End: 2025-06-21 | Stop reason: HOSPADM

## 2025-06-20 RX ADMIN — IRON SUCROSE 300 MG: 20 INJECTION, SOLUTION INTRAVENOUS at 08:59

## 2025-06-24 ENCOUNTER — HOSPITAL ENCOUNTER (INPATIENT)
Facility: HOSPITAL | Age: 85
LOS: 2 days | Discharge: NURSING, REHAB OR LONG-TERM CARE | DRG: 690 | End: 2025-06-26
Attending: FAMILY MEDICINE | Admitting: FAMILY MEDICINE
Payer: MEDICARE

## 2025-06-24 ENCOUNTER — APPOINTMENT (OUTPATIENT)
Dept: GENERAL RADIOLOGY | Facility: HOSPITAL | Age: 85
DRG: 690 | End: 2025-06-24
Payer: MEDICARE

## 2025-06-24 DIAGNOSIS — R31.9 URINARY TRACT INFECTION WITH HEMATURIA, SITE UNSPECIFIED: Primary | ICD-10-CM

## 2025-06-24 DIAGNOSIS — D69.6 THROMBOCYTOPENIA: ICD-10-CM

## 2025-06-24 DIAGNOSIS — D64.9 ANEMIA, UNSPECIFIED TYPE: ICD-10-CM

## 2025-06-24 DIAGNOSIS — R78.81 E COLI BACTEREMIA: ICD-10-CM

## 2025-06-24 DIAGNOSIS — D72.829 LEUKOCYTOSIS, UNSPECIFIED TYPE: ICD-10-CM

## 2025-06-24 DIAGNOSIS — B96.20 E COLI BACTEREMIA: ICD-10-CM

## 2025-06-24 DIAGNOSIS — N39.0 URINARY TRACT INFECTION WITH HEMATURIA, SITE UNSPECIFIED: Primary | ICD-10-CM

## 2025-06-24 DIAGNOSIS — M25.521 RIGHT ELBOW PAIN: ICD-10-CM

## 2025-06-24 DIAGNOSIS — R29.6 FREQUENT FALLS: ICD-10-CM

## 2025-06-24 LAB
25(OH)D3 SERPL-MCNC: 12.4 NG/ML (ref 30–100)
ALBUMIN SERPL-MCNC: 3.6 G/DL (ref 3.5–5.2)
ALBUMIN/GLOB SERPL: 1.4 G/DL
ALP SERPL-CCNC: 84 U/L (ref 39–117)
ALT SERPL W P-5'-P-CCNC: 19 U/L (ref 1–33)
AMPHET+METHAMPHET UR QL: NEGATIVE
AMPHETAMINES UR QL: NEGATIVE
ANION GAP SERPL CALCULATED.3IONS-SCNC: 14.8 MMOL/L (ref 5–15)
APTT PPP: 29.9 SECONDS (ref 22.7–35.4)
AST SERPL-CCNC: 35 U/L (ref 1–32)
B PARAPERT DNA SPEC QL NAA+PROBE: NOT DETECTED
B PERT DNA SPEC QL NAA+PROBE: NOT DETECTED
BACTERIA UR QL AUTO: ABNORMAL /HPF
BARBITURATES UR QL SCN: NEGATIVE
BENZODIAZ UR QL SCN: NEGATIVE
BILIRUB SERPL-MCNC: 0.6 MG/DL (ref 0–1.2)
BILIRUB UR QL STRIP: NEGATIVE
BUN SERPL-MCNC: 34.1 MG/DL (ref 8–23)
BUN/CREAT SERPL: 22.6 (ref 7–25)
BUPRENORPHINE SERPL-MCNC: NEGATIVE NG/ML
C PNEUM DNA NPH QL NAA+NON-PROBE: NOT DETECTED
CALCIUM SPEC-SCNC: 8.4 MG/DL (ref 8.6–10.5)
CANNABINOIDS SERPL QL: NEGATIVE
CHLORIDE SERPL-SCNC: 103 MMOL/L (ref 98–107)
CK SERPL-CCNC: 314 U/L (ref 20–180)
CK SERPL-CCNC: 348 U/L (ref 20–180)
CLARITY UR: ABNORMAL
CO2 SERPL-SCNC: 15.2 MMOL/L (ref 22–29)
COCAINE UR QL: NEGATIVE
COLOR UR: ABNORMAL
CREAT SERPL-MCNC: 1.51 MG/DL (ref 0.57–1)
D-LACTATE SERPL-SCNC: 0.3 MMOL/L (ref 0.3–2)
DEPRECATED RDW RBC AUTO: 53.4 FL (ref 37–54)
EGFRCR SERPLBLD CKD-EPI 2021: 33.9 ML/MIN/1.73
ELLIPTOCYTES BLD QL SMEAR: ABNORMAL
ERYTHROCYTE [DISTWIDTH] IN BLOOD BY AUTOMATED COUNT: 16.1 % (ref 12.3–15.4)
ETHANOL UR QL: <0.01 %
FERRITIN SERPL-MCNC: 1037 NG/ML (ref 13–150)
FLUAV SUBTYP SPEC NAA+PROBE: NOT DETECTED
FLUBV RNA ISLT QL NAA+PROBE: NOT DETECTED
GEN 5 1HR TROPONIN T REFLEX: 37 NG/L
GIANT PLATELETS: ABNORMAL
GLOBULIN UR ELPH-MCNC: 2.5 GM/DL
GLUCOSE BLDC GLUCOMTR-MCNC: 111 MG/DL (ref 70–105)
GLUCOSE BLDC GLUCOMTR-MCNC: 135 MG/DL (ref 70–105)
GLUCOSE SERPL-MCNC: 130 MG/DL (ref 65–99)
GLUCOSE UR STRIP-MCNC: NEGATIVE MG/DL
HADV DNA SPEC NAA+PROBE: NOT DETECTED
HBA1C MFR BLD: 6.11 % (ref 4.8–5.6)
HCOV 229E RNA SPEC QL NAA+PROBE: NOT DETECTED
HCOV HKU1 RNA SPEC QL NAA+PROBE: NOT DETECTED
HCOV NL63 RNA SPEC QL NAA+PROBE: NOT DETECTED
HCOV OC43 RNA SPEC QL NAA+PROBE: NOT DETECTED
HCT VFR BLD AUTO: 33.1 % (ref 34–46.6)
HGB BLD-MCNC: 10 G/DL (ref 12–15.9)
HGB UR QL STRIP.AUTO: ABNORMAL
HMPV RNA NPH QL NAA+NON-PROBE: NOT DETECTED
HPIV1 RNA ISLT QL NAA+PROBE: NOT DETECTED
HPIV2 RNA SPEC QL NAA+PROBE: NOT DETECTED
HPIV3 RNA NPH QL NAA+PROBE: NOT DETECTED
HPIV4 P GENE NPH QL NAA+PROBE: NOT DETECTED
HYALINE CASTS UR QL AUTO: ABNORMAL /LPF
INR PPP: 1.27 (ref 0.9–1.1)
KETONES UR QL STRIP: ABNORMAL
LEUKOCYTE ESTERASE UR QL STRIP.AUTO: ABNORMAL
LIPASE SERPL-CCNC: 12 U/L (ref 13–60)
LYMPHOCYTES # BLD MANUAL: 15.86 10*3/MM3 (ref 0.7–3.1)
LYMPHOCYTES NFR BLD MANUAL: 3 % (ref 5–12)
M PNEUMO IGG SER IA-ACNC: NOT DETECTED
MAGNESIUM SERPL-MCNC: 1.2 MG/DL (ref 1.6–2.4)
MCH RBC QN AUTO: 27.6 PG (ref 26.6–33)
MCHC RBC AUTO-ENTMCNC: 30.2 G/DL (ref 31.5–35.7)
MCV RBC AUTO: 91.4 FL (ref 79–97)
METHADONE UR QL SCN: NEGATIVE
MONOCYTES # BLD: 0.73 10*3/MM3 (ref 0.1–0.9)
NEUTROPHILS # BLD AUTO: 7.81 10*3/MM3 (ref 1.7–7)
NEUTROPHILS NFR BLD MANUAL: 29 % (ref 42.7–76)
NEUTS BAND NFR BLD MANUAL: 3 % (ref 0–5)
NITRITE UR QL STRIP: NEGATIVE
NT-PROBNP SERPL-MCNC: 995 PG/ML (ref 0–1800)
OPIATES UR QL: POSITIVE
OXYCODONE UR QL SCN: NEGATIVE
PATHOLOGY REVIEW: YES
PCP UR QL SCN: NEGATIVE
PH UR STRIP.AUTO: <=5 [PH] (ref 5–8)
PHOSPHATE SERPL-MCNC: 2.9 MG/DL (ref 2.5–4.5)
PLATELET # BLD AUTO: 115 10*3/MM3 (ref 140–450)
PMV BLD AUTO: 12.3 FL (ref 6–12)
POIKILOCYTOSIS BLD QL SMEAR: ABNORMAL
POTASSIUM SERPL-SCNC: 4.1 MMOL/L (ref 3.5–5.2)
PROT SERPL-MCNC: 6.1 G/DL (ref 6–8.5)
PROT UR QL STRIP: ABNORMAL
PROTHROMBIN TIME: 15.9 SECONDS (ref 11.7–14.2)
RBC # BLD AUTO: 3.62 10*6/MM3 (ref 3.77–5.28)
RBC # UR STRIP: ABNORMAL /HPF
REF LAB TEST METHOD: ABNORMAL
RHINOVIRUS RNA SPEC NAA+PROBE: NOT DETECTED
RSV RNA NPH QL NAA+NON-PROBE: NOT DETECTED
SARS-COV-2 RNA RESP QL NAA+PROBE: NOT DETECTED
SCAN SLIDE: NORMAL
SMALL PLATELETS BLD QL SMEAR: ABNORMAL
SMUDGE CELLS BLD QL SMEAR: ABNORMAL
SODIUM SERPL-SCNC: 133 MMOL/L (ref 136–145)
SP GR UR STRIP: 1.02 (ref 1–1.03)
SQUAMOUS #/AREA URNS HPF: ABNORMAL /HPF
T4 FREE SERPL-MCNC: 1.05 NG/DL (ref 0.92–1.68)
TRICYCLICS UR QL SCN: NEGATIVE
TROPONIN T % DELTA: -3
TROPONIN T NUMERIC DELTA: -1 NG/L
TROPONIN T SERPL HS-MCNC: 38 NG/L
TSH SERPL DL<=0.05 MIU/L-ACNC: 4.26 UIU/ML (ref 0.27–4.2)
UROBILINOGEN UR QL STRIP: ABNORMAL
VARIANT LYMPHS NFR BLD MANUAL: 65 % (ref 19.6–45.3)
VIT B12 BLD-MCNC: 381 PG/ML (ref 211–946)
WBC # UR STRIP: ABNORMAL /HPF
WBC NRBC COR # BLD AUTO: 24.4 10*3/MM3 (ref 3.4–10.8)

## 2025-06-24 PROCEDURE — 82607 VITAMIN B-12: CPT

## 2025-06-24 PROCEDURE — 83880 ASSAY OF NATRIURETIC PEPTIDE: CPT | Performed by: OCCUPATIONAL THERAPIST

## 2025-06-24 PROCEDURE — 84484 ASSAY OF TROPONIN QUANT: CPT | Performed by: OCCUPATIONAL THERAPIST

## 2025-06-24 PROCEDURE — 36415 COLL VENOUS BLD VENIPUNCTURE: CPT

## 2025-06-24 PROCEDURE — 83690 ASSAY OF LIPASE: CPT | Performed by: OCCUPATIONAL THERAPIST

## 2025-06-24 PROCEDURE — 83735 ASSAY OF MAGNESIUM: CPT | Performed by: OCCUPATIONAL THERAPIST

## 2025-06-24 PROCEDURE — 63710000001 INSULIN LISPRO (HUMAN) PER 5 UNITS

## 2025-06-24 PROCEDURE — 84100 ASSAY OF PHOSPHORUS: CPT | Performed by: OCCUPATIONAL THERAPIST

## 2025-06-24 PROCEDURE — 85025 COMPLETE CBC W/AUTO DIFF WBC: CPT | Performed by: OCCUPATIONAL THERAPIST

## 2025-06-24 PROCEDURE — 84443 ASSAY THYROID STIM HORMONE: CPT | Performed by: OCCUPATIONAL THERAPIST

## 2025-06-24 PROCEDURE — 0202U NFCT DS 22 TRGT SARS-COV-2: CPT | Performed by: OCCUPATIONAL THERAPIST

## 2025-06-24 PROCEDURE — 73080 X-RAY EXAM OF ELBOW: CPT

## 2025-06-24 PROCEDURE — 87186 SC STD MICRODIL/AGAR DIL: CPT | Performed by: OCCUPATIONAL THERAPIST

## 2025-06-24 PROCEDURE — 81001 URINALYSIS AUTO W/SCOPE: CPT | Performed by: OCCUPATIONAL THERAPIST

## 2025-06-24 PROCEDURE — 80053 COMPREHEN METABOLIC PANEL: CPT | Performed by: OCCUPATIONAL THERAPIST

## 2025-06-24 PROCEDURE — 82306 VITAMIN D 25 HYDROXY: CPT

## 2025-06-24 PROCEDURE — 83605 ASSAY OF LACTIC ACID: CPT | Performed by: OCCUPATIONAL THERAPIST

## 2025-06-24 PROCEDURE — 85730 THROMBOPLASTIN TIME PARTIAL: CPT | Performed by: OCCUPATIONAL THERAPIST

## 2025-06-24 PROCEDURE — 83036 HEMOGLOBIN GLYCOSYLATED A1C: CPT

## 2025-06-24 PROCEDURE — 82728 ASSAY OF FERRITIN: CPT

## 2025-06-24 PROCEDURE — 82948 REAGENT STRIP/BLOOD GLUCOSE: CPT

## 2025-06-24 PROCEDURE — 25810000003 SODIUM CHLORIDE 0.9 % SOLUTION: Performed by: OCCUPATIONAL THERAPIST

## 2025-06-24 PROCEDURE — 85610 PROTHROMBIN TIME: CPT | Performed by: OCCUPATIONAL THERAPIST

## 2025-06-24 PROCEDURE — 97165 OT EVAL LOW COMPLEX 30 MIN: CPT

## 2025-06-24 PROCEDURE — 82550 ASSAY OF CK (CPK): CPT

## 2025-06-24 PROCEDURE — 82550 ASSAY OF CK (CPK): CPT | Performed by: OCCUPATIONAL THERAPIST

## 2025-06-24 PROCEDURE — 25810000003 DEXTROSE 5% IN LACTATED RINGERS PER 1000 ML

## 2025-06-24 PROCEDURE — 25010000002 CEFTRIAXONE PER 250 MG: Performed by: OCCUPATIONAL THERAPIST

## 2025-06-24 PROCEDURE — 87088 URINE BACTERIA CULTURE: CPT | Performed by: OCCUPATIONAL THERAPIST

## 2025-06-24 PROCEDURE — 82077 ASSAY SPEC XCP UR&BREATH IA: CPT | Performed by: OCCUPATIONAL THERAPIST

## 2025-06-24 PROCEDURE — 99285 EMERGENCY DEPT VISIT HI MDM: CPT

## 2025-06-24 PROCEDURE — 87154 CUL TYP ID BLD PTHGN 6+ TRGT: CPT | Performed by: OCCUPATIONAL THERAPIST

## 2025-06-24 PROCEDURE — 71045 X-RAY EXAM CHEST 1 VIEW: CPT

## 2025-06-24 PROCEDURE — 87040 BLOOD CULTURE FOR BACTERIA: CPT | Performed by: OCCUPATIONAL THERAPIST

## 2025-06-24 PROCEDURE — 93005 ELECTROCARDIOGRAM TRACING: CPT | Performed by: OCCUPATIONAL THERAPIST

## 2025-06-24 PROCEDURE — 97162 PT EVAL MOD COMPLEX 30 MIN: CPT

## 2025-06-24 PROCEDURE — 84439 ASSAY OF FREE THYROXINE: CPT | Performed by: OCCUPATIONAL THERAPIST

## 2025-06-24 PROCEDURE — 85007 BL SMEAR W/DIFF WBC COUNT: CPT | Performed by: OCCUPATIONAL THERAPIST

## 2025-06-24 PROCEDURE — 87086 URINE CULTURE/COLONY COUNT: CPT | Performed by: OCCUPATIONAL THERAPIST

## 2025-06-24 PROCEDURE — 80306 DRUG TEST PRSMV INSTRMNT: CPT | Performed by: OCCUPATIONAL THERAPIST

## 2025-06-24 RX ORDER — MIRTAZAPINE 15 MG/1
15 TABLET, FILM COATED ORAL NIGHTLY
Status: DISCONTINUED | OUTPATIENT
Start: 2025-06-24 | End: 2025-06-26 | Stop reason: HOSPADM

## 2025-06-24 RX ORDER — IRBESARTAN 300 MG/1
300 TABLET ORAL NIGHTLY
COMMUNITY

## 2025-06-24 RX ORDER — METOCLOPRAMIDE 5 MG/1
5 TABLET ORAL 3 TIMES DAILY PRN
Status: DISCONTINUED | OUTPATIENT
Start: 2025-06-24 | End: 2025-06-26 | Stop reason: HOSPADM

## 2025-06-24 RX ORDER — DEXTROSE MONOHYDRATE 25 G/50ML
25 INJECTION, SOLUTION INTRAVENOUS
Status: DISCONTINUED | OUTPATIENT
Start: 2025-06-24 | End: 2025-06-26 | Stop reason: HOSPADM

## 2025-06-24 RX ORDER — INSULIN LISPRO 100 [IU]/ML
2-7 INJECTION, SOLUTION INTRAVENOUS; SUBCUTANEOUS
Status: DISCONTINUED | OUTPATIENT
Start: 2025-06-24 | End: 2025-06-26 | Stop reason: HOSPADM

## 2025-06-24 RX ORDER — ACETAMINOPHEN 325 MG/1
650 TABLET ORAL EVERY 4 HOURS PRN
Status: DISCONTINUED | OUTPATIENT
Start: 2025-06-24 | End: 2025-06-26 | Stop reason: HOSPADM

## 2025-06-24 RX ORDER — ACETAMINOPHEN 650 MG/1
650 SUPPOSITORY RECTAL EVERY 4 HOURS PRN
Status: DISCONTINUED | OUTPATIENT
Start: 2025-06-24 | End: 2025-06-26 | Stop reason: HOSPADM

## 2025-06-24 RX ORDER — ACETAMINOPHEN 160 MG/5ML
650 SOLUTION ORAL EVERY 4 HOURS PRN
Status: DISCONTINUED | OUTPATIENT
Start: 2025-06-24 | End: 2025-06-26 | Stop reason: HOSPADM

## 2025-06-24 RX ORDER — PANTOPRAZOLE SODIUM 40 MG/1
40 TABLET, DELAYED RELEASE ORAL DAILY
COMMUNITY

## 2025-06-24 RX ORDER — SODIUM CHLORIDE 9 MG/ML
40 INJECTION, SOLUTION INTRAVENOUS AS NEEDED
Status: DISCONTINUED | OUTPATIENT
Start: 2025-06-24 | End: 2025-06-26 | Stop reason: HOSPADM

## 2025-06-24 RX ORDER — LEVOTHYROXINE SODIUM 100 UG/1
100 TABLET ORAL
Status: DISCONTINUED | OUTPATIENT
Start: 2025-06-25 | End: 2025-06-26 | Stop reason: HOSPADM

## 2025-06-24 RX ORDER — PANTOPRAZOLE SODIUM 40 MG/1
40 TABLET, DELAYED RELEASE ORAL DAILY
Status: DISCONTINUED | OUTPATIENT
Start: 2025-06-25 | End: 2025-06-26 | Stop reason: HOSPADM

## 2025-06-24 RX ORDER — TRAMADOL HYDROCHLORIDE 50 MG/1
50 TABLET ORAL EVERY 12 HOURS PRN
Status: DISCONTINUED | OUTPATIENT
Start: 2025-06-24 | End: 2025-06-26 | Stop reason: HOSPADM

## 2025-06-24 RX ORDER — FERROUS SULFATE 325(65) MG
325 TABLET ORAL
Status: DISCONTINUED | OUTPATIENT
Start: 2025-06-25 | End: 2025-06-26 | Stop reason: HOSPADM

## 2025-06-24 RX ORDER — ROSUVASTATIN CALCIUM 10 MG/1
10 TABLET, COATED ORAL NIGHTLY
Status: DISCONTINUED | OUTPATIENT
Start: 2025-06-24 | End: 2025-06-26 | Stop reason: HOSPADM

## 2025-06-24 RX ORDER — IBUPROFEN 600 MG/1
1 TABLET ORAL
Status: DISCONTINUED | OUTPATIENT
Start: 2025-06-24 | End: 2025-06-26 | Stop reason: HOSPADM

## 2025-06-24 RX ORDER — DEXTROSE, SODIUM CHLORIDE, SODIUM LACTATE, POTASSIUM CHLORIDE, AND CALCIUM CHLORIDE 5; .6; .31; .03; .02 G/100ML; G/100ML; G/100ML; G/100ML; G/100ML
75 INJECTION, SOLUTION INTRAVENOUS CONTINUOUS
Status: DISPENSED | OUTPATIENT
Start: 2025-06-24 | End: 2025-06-25

## 2025-06-24 RX ORDER — AMLODIPINE BESYLATE 5 MG/1
10 TABLET ORAL DAILY
Status: DISCONTINUED | OUTPATIENT
Start: 2025-06-25 | End: 2025-06-26 | Stop reason: HOSPADM

## 2025-06-24 RX ORDER — SODIUM CHLORIDE 0.9 % (FLUSH) 0.9 %
10 SYRINGE (ML) INJECTION EVERY 12 HOURS SCHEDULED
Status: DISCONTINUED | OUTPATIENT
Start: 2025-06-24 | End: 2025-06-26 | Stop reason: HOSPADM

## 2025-06-24 RX ORDER — DOCUSATE SODIUM 100 MG/1
100 CAPSULE, LIQUID FILLED ORAL 2 TIMES DAILY
Status: DISCONTINUED | OUTPATIENT
Start: 2025-06-24 | End: 2025-06-26 | Stop reason: HOSPADM

## 2025-06-24 RX ORDER — ROPINIROLE 0.25 MG/1
0.25 TABLET, FILM COATED ORAL NIGHTLY
Status: DISCONTINUED | OUTPATIENT
Start: 2025-06-24 | End: 2025-06-26 | Stop reason: HOSPADM

## 2025-06-24 RX ORDER — ROPINIROLE 0.25 MG/1
0.25 TABLET, FILM COATED ORAL NIGHTLY
COMMUNITY

## 2025-06-24 RX ORDER — SODIUM CHLORIDE 0.9 % (FLUSH) 0.9 %
10 SYRINGE (ML) INJECTION AS NEEDED
Status: DISCONTINUED | OUTPATIENT
Start: 2025-06-24 | End: 2025-06-26 | Stop reason: HOSPADM

## 2025-06-24 RX ORDER — CHLORTHALIDONE 25 MG/1
25 TABLET ORAL DAILY
COMMUNITY

## 2025-06-24 RX ORDER — NICOTINE POLACRILEX 4 MG
15 LOZENGE BUCCAL
Status: DISCONTINUED | OUTPATIENT
Start: 2025-06-24 | End: 2025-06-26 | Stop reason: HOSPADM

## 2025-06-24 RX ADMIN — SODIUM CHLORIDE, SODIUM LACTATE, POTASSIUM CHLORIDE, CALCIUM CHLORIDE AND DEXTROSE MONOHYDRATE 75 ML/HR: 5; 600; 310; 30; 20 INJECTION, SOLUTION INTRAVENOUS at 15:31

## 2025-06-24 RX ADMIN — MIRTAZAPINE 15 MG: 15 TABLET, FILM COATED ORAL at 20:51

## 2025-06-24 RX ADMIN — SERTRALINE HYDROCHLORIDE 150 MG: 100 TABLET, FILM COATED ORAL at 17:40

## 2025-06-24 RX ADMIN — Medication 10 ML: at 20:51

## 2025-06-24 RX ADMIN — INSULIN LISPRO 5 UNITS: 100 INJECTION, SOLUTION INTRAVENOUS; SUBCUTANEOUS at 17:40

## 2025-06-24 RX ADMIN — ROPINIROLE HYDROCHLORIDE 0.25 MG: 0.25 TABLET, FILM COATED ORAL at 20:51

## 2025-06-24 RX ADMIN — SODIUM CHLORIDE 1000 ML: 9 INJECTION, SOLUTION INTRAVENOUS at 12:30

## 2025-06-24 RX ADMIN — APIXABAN 2.5 MG: 2.5 TABLET, FILM COATED ORAL at 20:51

## 2025-06-24 RX ADMIN — ROSUVASTATIN CALCIUM 10 MG: 10 TABLET, FILM COATED ORAL at 20:51

## 2025-06-24 RX ADMIN — ACETAMINOPHEN 650 MG: 325 TABLET, FILM COATED ORAL at 17:41

## 2025-06-24 RX ADMIN — CEFTRIAXONE SODIUM 1000 MG: 1 INJECTION, POWDER, FOR SOLUTION INTRAMUSCULAR; INTRAVENOUS at 12:30

## 2025-06-24 RX ADMIN — DOCUSATE SODIUM 100 MG: 100 CAPSULE, LIQUID FILLED ORAL at 20:51

## 2025-06-24 NOTE — PLAN OF CARE
Goal Outcome Evaluation:  Plan of Care Reviewed With: patient        Progress: no change  Outcome Evaluation: Ptr. is an 85 y.o. female with medical hx of CLL, CKD, DM, HLD, HTN and hypothyroidism who presented to [facility] on 6/24/25 after multiple falls. Pt lives with her spouse who has dementia in a [facility] with no SAMY. Pt. is ADL independent at baseline, assists in the care of her spouse. States that she has had increased weakness x 3 weeks. Pt. requires mod A x 2 for all bed mobility this date, limited sitting tolerance due to fatigue. Pt. provided complete assist for all LB ADLs. Pt. is not safe to d/c home at this time and will require SNF placement to address aforementioned deficits.    Anticipated Discharge Disposition (OT): skilled nursing facility

## 2025-06-24 NOTE — PLAN OF CARE
Goal Outcome Evaluation:  Plan of Care Reviewed With: patient, child           Outcome Evaluation: Diane Guan is an 85 y.o. female with medical hx of CLL, CKD, DM, HLD, HTN and hypothyroidism who presented to [facility] on 6/24/25 after multiple falls. Pt lives with her spouse who has dementia in a [facility] with no SAMY. Pt has a RW, BSC, hospital bed and wheelchair available and is typically IND with mobility and ADLs, assists her  as needed. Today pt is A&Ox4. She is very painful but willing to attempt to get to EOB. She requires ModA of 2 supine to sit EOB and immediately becomes dizzy needing to return to supine. Pt below baseline at this time and not safe to return home. PT recommending SNF upon d/c once medically stable. PT will follow and progress as tolerated.    Anticipated Discharge Disposition (PT): skilled nursing facility

## 2025-06-24 NOTE — THERAPY EVALUATION
Patient Name: Diane Guan  : 1940    MRN: [id number]                              Today's Date: 2025       Admit Date: 2025    Visit Dx:     ICD-10-CM ICD-9-CM   1. Urinary tract infection with hematuria, site unspecified  N39.0 599.0    R31.9 599.70   2. Anemia, unspecified type  D64.9 285.9   3. Leukocytosis, unspecified type  D72.829 288.60   4. Frequent falls  R29.6 V15.88   5. Thrombocytopenia  D69.6 287.5   6. 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    Acquired hypothyroidism    Type 2 diabetes mellitus with stage 3b chronic kidney disease, without long-term current use of insulin    Insomnia    Postmenopausal status    Shoulder pain    Overactive bladder    Vitamin B 12 deficiency    Seasonal allergies    Absolute anemia    Vitamin D deficiency    LIBBY (obstructive sleep apnea)    Nocturnal hypoxia    Acute respiratory failure with hypoxia and hypercapnia    Abdominal pain    COPD (chronic obstructive pulmonary disease)    CKD (chronic kidney disease) stage 3, GFR 30-59 ml/min    Sepsis due to pneumonia    Acute on chronic respiratory failure with hypoxemia    Class 1 obesity due to excess calories with serious comorbidity and body mass index (BMI) of 32.0 to 32.9 in adult    Acute hypoxemic respiratory failure    History of breast cancer    Personal history of CLL (chronic lymphocytic leukemia)    History of cigarette smoking    Fall at home    Rhinovirus infection    Acute respiratory failure with hypoxia    Hyperkalemia    Respiratory failure    Leukocytosis    Iron deficiency anemia    Malabsorption of iron    UTI (urinary  tract infection)     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/2019    Depression     Diabetes mellitus     Disease of thyroid gland     Diverticulosis of colon 2018    Identified on colonoscopy    DVT (deep venous thrombosis)     Dysphagia 12/2020    Dyspnea on exertion     Fall at home 10/11/2023    Hemorrhoid     Hiatal hernia 12/2020    Hiatal hernia with GERD     large hiatal hernia with high-grade reflux on barium swallow; s/p laparoscopic fundoplication with gastropexy    History of breast cancer 10/11/2023    History of cigarette smoking 10/11/2023    History of diabetes mellitus     no meds now    HL (hearing loss)     Hyperlipidemia     Hypertension     Knee swelling     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    aspiration pneumonia; completed abx therapy    OAB (overactive bladder)     Personal history of CLL (chronic lymphocytic leukemia) 10/11/2023    Pulmonary emboli     Skin cancer     Sleep apnea     daughter states pt does not have and doesn't have machine     Past Surgical History:   Procedure Laterality Date    BLADDER SURGERY      BREAST BIOPSY      BRONCHOSCOPY N/A 09/13/2019    Procedure: BRONCHOSCOPY with bronchial washing;  Surgeon: Marnie Frankel MD;  Location: [facility] ENDOSCOPY;  Service: Pulmonary    BRONCHOSCOPY Bilateral 10/18/2023    Procedure: BRONCHOSCOPY AT BEDSIDE;  Surgeon: Vinicius Heredia DO;  Location: [facility] ENDOSCOPY;  Service: Pulmonary;  Laterality: Bilateral;    COLONOSCOPY  07/19/2018    severe diverticulosis    CYST REMOVAL      Removed a fatty cyst off of her back    ENDOSCOPY N/A 11/23/2020    Procedure: ESOPHAGOGASTRODUODENOSCOPY with dilatation ([place]ougie # 48, 50, 52, 54, 56, 58);  Surgeon: Den Plummer DO;  Location: [facility] ENDOSCOPY;  Service: General;   Laterality: N/A;  Post: large hiatal hernia, joshua's ulcers    EYE SURGERY      HERNIA REPAIR      HIATAL HERNIA REPAIR N/A 12/30/2020    Procedure: Laparoscopic hiatal hernia repair with gastropexy;  Surgeon: Den Plummer DO;  Location: [facility] MAIN OR;  Service: General;  Laterality: N/A;    MASTECTOMY Right 02/04/2019    Invasive ductal carcinoma    TUBAL ABDOMINAL LIGATION        General Information       Row Name 06/24/25 1715          OT Time and Intention    Document Type evaluation  -MP     Mode of Treatment occupational therapy  -MP       Row Name 06/24/25 1715          General Information    Patient Profile Reviewed yes  -MP     Prior Level of Function independent:;ADL's  -MP     Existing Precautions/Restrictions fall  -MP       Row Name 06/24/25 1715          Living Environment    Current Living Arrangements home  -MP       Row Name 06/24/25 1715          Cognition    Orientation Status (Cognition) oriented x 4  -MP       Row Name 06/24/25 1715          Safety Issues/Impairments Affecting Functional Mobility    Impairments Affecting Function (Mobility) balance;endurance/activity tolerance;strength;pain;postural/trunk control  -MP               User Key  (r) = Recorded By, (t) = Taken By, (c) = Cosigned By      Initials Name Provider Type    MP Noam Dunn OT Occupational Therapist                     Mobility/ADL's       Row Name 06/24/25 1716          Bed Mobility    Bed Mobility bed mobility (all) activities  -MP     All Activities, [place] (Bed Mobility) moderate assist (50% patient effort);2 person assist  -MP               User Key  (r) = Recorded By, (t) = Taken By, (c) = Cosigned By      Initials Name Provider Type    Noam Hahn OT Occupational Therapist                   Obj/Interventions       Row Name 06/24/25 1721          Range of Motion Comprehensive    Comment, General Range of Motion B shoulder AROM impacted 25-50%  -MP       Row Name 06/24/25 1721           Strength Comprehensive (MMT)    Comment, General Manual Muscle Testing (MMT) Assessment BUE 3/5  -MP       Row Name 06/24/25 1721          Balance    Balance Interventions sitting;standing;supported;sit to stand;static;dynamic  -MP               User Key  (r) = Recorded By, (t) = Taken By, (c) = Cosigned By      Initials Name Provider Type    Noam Hahn OT Occupational Therapist                   Goals/Plan       Row Name 06/24/25 1727          Bed Mobility Goal 1 (OT)    Activity/Assistive Device (Bed Mobility Goal 1, OT) bed mobility activities, all  -MP     [place] Level/Cues Needed (Bed Mobility Goal 1, OT) minimum assist (75% or more patient effort)  -MP     Time Frame (Bed Mobility Goal 1, OT) long term goal (LTG);2 weeks  -MP       Row Name 06/24/25 1727          Transfer Goal 1 (OT)    Activity/Assistive Device (Transfer Goal 1, OT) sit-to-stand/stand-to-sit;toilet  -MP     [place] Level/Cues Needed (Transfer Goal 1, OT) minimum assist (75% or more patient effort)  -MP     Time Frame (Transfer Goal 1, OT) long term goal (LTG);2 weeks  -MP       Row Name 06/24/25 1727          Dressing Goal 1 (OT)    Activity/Device (Dressing Goal 1, OT) lower body dressing  -MP     [place]/Cues Needed (Dressing Goal 1, OT) minimum assist (75% or more patient effort)  -MP     Time Frame (Dressing Goal 1, OT) long term goal (LTG);2 weeks  -MP               User Key  (r) = Recorded By, (t) = Taken By, (c) = Cosigned By      Initials Name Provider Type    Noam Hahn OT Occupational Therapist                   Clinical Impression       Row Name 06/24/25 1723          Pain Assessment    Pretreatment Pain Rating 8/10  -MP     Posttreatment Pain Rating 9/10  -MP       Row Name 06/24/25 1723          Plan of Care Review    Plan of Care Reviewed With patient  -MP     Progress no change  -MP     Outcome Evaluation Ptr. is an 85 y.o. female with medical hx of CLL, CKD, DM, HLD, HTN and  hypothyroidism who presented to [facility] on 6/24/25 after multiple falls. Pt lives with her spouse who has dementia in a [place] with no SAMY. Pt. is ADL independent at baseline, assists in the care of her spouse. States that she has had increased weakness x 3 weeks. Pt. requires mod A x 2 for all bed mobility this date, limited sitting tolerance due to fatigue. Pt. provided complete assist for all LB ADLs. Pt. is not safe to d/c home at this time and will require SNF placement to address aforementioned deficits.  -MP       Row Name 06/24/25 1723          Therapy Assessment/Plan (OT)    Rehab Potential (OT) good  -MP     Criteria for Skilled Therapeutic Interventions Met (OT) yes;meets criteria;skilled treatment is necessary  -MP     Therapy Frequency (OT) 3 times/wk  -MP       Row Name 06/24/25 1723          Therapy Plan Review/Discharge Plan (OT)    Anticipated Discharge Disposition (OT) skilled nursing facility  -MP       Row Name 06/24/25 1723          Vital Signs    Pre Patient Position Supine  -MP     Intra Patient Position Sitting  -MP     Post Patient Position Supine  -MP       Row Name 06/24/25 1723          Positioning and Restraints    Pre-Treatment Position in bed  -MP     Post Treatment Position bed  -MP     In Bed supine;call light within reach;encouraged to call for assist  -MP               User Key  (r) = Recorded By, (t) = Taken By, (c) = Cosigned By      Initials Name Provider Type    Noam Hahn OT Occupational Therapist                   Outcome Measures    No documentation.                   Occupational Therapy Education       Title: PT OT SLP Therapies (In Progress)       Topic: Occupational Therapy (In Progress)       Point: Body mechanics (Done)       Learning Progress Summary            Patient Acceptance, E,TB, VU by  at 6/24/2025 1727                                      User Key       Initials Effective Dates Name Provider Type Discipline     06/16/21 -  Noam Dunn OT  Occupational Therapist OT                  OT Recommendation and Plan  Therapy Frequency (OT): 3 times/wk  Plan of Care Review  Plan of Care Reviewed With: patient  Progress: no change  Outcome Evaluation: Ptr. is an 85 y.o. female with medical hx of CLL, CKD, DM, HLD, HTN and hypothyroidism who presented to [facility] on 6/24/25 after multiple falls. Pt lives with her spouse who has dementia in a [facility] with no SAMY. Pt. is ADL independent at baseline, assists in the care of her spouse. States that she has had increased weakness x 3 weeks. Pt. requires mod A x 2 for all bed mobility this date, limited sitting tolerance due to fatigue. Pt. provided complete assist for all LB ADLs. Pt. is not safe to d/c home at this time and will require SNF placement to address aforementioned deficits.     Time Calculation:         Time Calculation- OT       Row Name 06/24/25 1727             Time Calculation- OT    OT Start Time 1435  -MP      OT Stop Time 1455  -MP      OT Time Calculation (min) 20 min  -MP      Total Timed Code Minutes- OT 0 minute(s)  -MP      OT Received On 06/24/25  -      OT - Next Appointment 06/26/25  -      OT Goal Re-Cert Due Date 07/08/25  -                User Key  (r) = Recorded By, (t) = Taken By, (c) = Cosigned By      Initials Name Provider Type    Noam Hahn OT Occupational Therapist                  Therapy Charges for Today       Code Description Service Date Service Provider Modifiers Qty    [id number] HC OT EVAL LOW COMPLEXITY 4 6/24/2025 Noam Dunn OT GO 1                 Noam Dunn OT  6/24/2025

## 2025-06-24 NOTE — THERAPY EVALUATION
Patient Name: Diane Guan  : 1940    MRN: [id number]                              Today's Date: 2025       Admit Date: 2025    Visit Dx:     ICD-10-CM ICD-9-CM   1. Urinary tract infection with hematuria, site unspecified  N39.0 599.0    R31.9 599.70   2. Anemia, unspecified type  D64.9 285.9   3. Leukocytosis, unspecified type  D72.829 288.60   4. Frequent falls  R29.6 V15.88   5. Thrombocytopenia  D69.6 287.5   6. 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    Acquired hypothyroidism    Type 2 diabetes mellitus with stage 3b chronic kidney disease, without long-term current use of insulin    Insomnia    Postmenopausal status    Shoulder pain    Overactive bladder    Vitamin B 12 deficiency    Seasonal allergies    Absolute anemia    Vitamin D deficiency    LIBBY (obstructive sleep apnea)    Nocturnal hypoxia    Acute respiratory failure with hypoxia and hypercapnia    Abdominal pain    COPD (chronic obstructive pulmonary disease)    CKD (chronic kidney disease) stage 3, GFR 30-59 ml/min    Sepsis due to pneumonia    Acute on chronic respiratory failure with hypoxemia    Class 1 obesity due to excess calories with serious comorbidity and body mass index (BMI) of 32.0 to 32.9 in adult    Acute hypoxemic respiratory failure    History of breast cancer    Personal history of CLL (chronic lymphocytic leukemia)    History of cigarette smoking    Fall at home    Rhinovirus infection    Acute respiratory failure with hypoxia    Hyperkalemia    Respiratory failure    Leukocytosis    Iron deficiency anemia    Malabsorption of iron    UTI (urinary  tract infection)     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/2019    Depression     Diabetes mellitus     Disease of thyroid gland     Diverticulosis of colon 2018    Identified on colonoscopy    DVT (deep venous thrombosis)     Dysphagia 12/2020    Dyspnea on exertion     Fall at home 10/11/2023    Hemorrhoid     Hiatal hernia 12/2020    Hiatal hernia with GERD     large hiatal hernia with high-grade reflux on barium swallow; s/p laparoscopic fundoplication with gastropexy    History of breast cancer 10/11/2023    History of cigarette smoking 10/11/2023    History of diabetes mellitus     no meds now    HL (hearing loss)     Hyperlipidemia     Hypertension     Knee swelling     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    aspiration pneumonia; completed abx therapy    OAB (overactive bladder)     Personal history of CLL (chronic lymphocytic leukemia) 10/11/2023    Pulmonary emboli     Skin cancer     Sleep apnea     daughter states pt does not have and doesn't have machine     Past Surgical History:   Procedure Laterality Date    BLADDER SURGERY      BREAST BIOPSY      BRONCHOSCOPY N/A 09/13/2019    Procedure: BRONCHOSCOPY with bronchial washing;  Surgeon: Marnie Frankel MD;  Location: [facility];  Service: Pulmonary    BRONCHOSCOPY Bilateral 10/18/2023    Procedure: BRONCHOSCOPY AT BEDSIDE;  Surgeon: Vinicius Heredia DO;  Location: [facility] ENDOSCOPY;  Service: Pulmonary;  Laterality: Bilateral;    COLONOSCOPY  07/19/2018    severe diverticulosis    CYST REMOVAL      Removed a fatty cyst off of her back    ENDOSCOPY N/A 11/23/2020    Procedure: ESOPHAGOGASTRODUODENOSCOPY with dilatation (Bougie # 48, 50, 52, 54, 56, 58);  Surgeon: Den Plummer DO;  Location: [facility] ENDOSCOPY;  Service: General;   Laterality: N/A;  Post: large hiatal hernia, joshua's ulcers    EYE SURGERY      HERNIA REPAIR      HIATAL HERNIA REPAIR N/A 12/30/2020    Procedure: Laparoscopic hiatal hernia repair with gastropexy;  Surgeon: Den Plummer DO;  Location: [facility] MAIN OR;  Service: General;  Laterality: N/A;    MASTECTOMY Right 02/04/2019    Invasive ductal carcinoma    TUBAL ABDOMINAL LIGATION        General Information       Row Name 06/24/25 1629          Physical Therapy Time and Intention    Document Type evaluation  -     Mode of Treatment physical therapy  -       Row Name 06/24/25 1629          General Information    Patient Profile Reviewed yes  -     Prior Level of Function independent:;all household mobility;community mobility;ADL's  -     Existing Precautions/Restrictions fall  -     Barriers to Rehab medically complex  -       Row Name 06/24/25 1629          Living Environment    Current Living Arrangements home  -     People in Home spouse  -[facility] Name 06/24/25 1629          Home Main Entrance    Number of Stairs, Main Entrance none  -       Row Name 06/24/25 1629          Stairs Within Home, Primary    Number of Stairs, Within Home, Primary none  -[facility] Name 06/24/25 1629          Cognition    Orientation Status (Cognition) oriented x 4  -       Row Name 06/24/25 1629          Safety Issues/Impairments Affecting Functional Mobility    Impairments Affecting Function (Mobility) balance;endurance/activity tolerance;strength;pain;postural/trunk control  -               User Key  (r) = Recorded By, (t) = Taken By, (c) = Cosigned By      Initials Name Provider Type     Kita Garcia, PT Physical Therapist                   Mobility       Row Name 06/24/25 1634          Bed Mobility    Bed Mobility bed mobility (all) activities  -     All Activities, [place] (Bed Mobility) moderate assist (50% patient effort);2 person assist  -     Assistive Device (Bed Mobility)  head of bed elevated  -AH       Row Name 06/24/25 1634          Bed-Chair Transfer    Bed-Chair [place] (Transfers) unable to assess  -AH       Row Name 06/24/25 1634          Sit-Stand Transfer    Sit-Stand [place] (Transfers) unable to assess  -AH       Row Name 06/24/25 1634          Gait/Stairs (Locomotion)    Patient was able to Ambulate no, other medical factors prevent ambulation  -     Reason Patient was unable to Ambulate Dizziness;Uncontrolled Pain  -               User Key  (r) = Recorded By, (t) = Taken By, (c) = Cosigned By      Initials Name Provider Type    Kita Zimmerman, PT Physical Therapist                   Obj/Interventions       Row Name 06/24/25 1634          Range of Motion Comprehensive    General Range of Motion bilateral lower extremity ROM WFL  -AH       Row Name 06/24/25 1634          Strength Comprehensive (MMT)    General Manual Muscle Testing (MMT) Assessment lower extremity strength deficits identified  -     Comment, General Manual Muscle Testing (MMT) Assessment BLE grossly 3-/5, limited by pain  -AH       Row Name 06/24/25 1634          Balance    Balance Assessment sitting static balance  -     Static Sitting Balance moderate assist  -     Position, Sitting Balance unsupported;sitting edge of bed  -AH       Row Name 06/24/25 1634          Sensory Assessment (Somatosensory)    Sensory Assessment (Somatosensory) sensation intact  -               User Key  (r) = Recorded By, (t) = Taken By, (c) = Cosigned By      Initials Name Provider Type    Kita Zimmerman, PT Physical Therapist                   Goals/Plan       Row Name 06/24/25 1641          Bed Mobility Goal 1 (PT)    Activity/Assistive Device (Bed Mobility Goal 1, PT) bed mobility activities, all  -     [place] Level/Cues Needed (Bed Mobility Goal 1, PT) independent  -     Time Frame (Bed Mobility Goal 1, PT) long term goal (LTG);2 weeks  -AH       Row Name 06/24/25  1641          Transfer Goal 1 (PT)    Activity/Assistive Device (Transfer Goal 1, PT) transfers, all  -     [place] Level/Cues Needed (Transfer Goal 1, PT) independent  -     Time Frame (Transfer Goal 1, PT) long term goal (LTG);2 weeks  -       Row Name 06/24/25 1641          Gait Training Goal 1 (PT)    Activity/Assistive Device (Gait Training Goal 1, PT) gait (walking locomotion)  -     [place] Level (Gait Training Goal 1, PT) modified independence  -     Distance (Gait Training Goal 1, PT) 60 ft  -     Time Frame (Gait Training Goal 1, PT) long term goal (LTG);2 weeks  -       Row Name 06/24/25 1641          Therapy Assessment/Plan (PT)    Planned Therapy Interventions (PT) balance training;bed mobility training;gait training;transfer training;strengthening;patient/family education;postural re-education  -               User Key  (r) = Recorded By, (t) = Taken By, (c) = Cosigned By      Initials Name Provider Type    Kita Zimmerman, PT Physical Therapist                   Clinical Impression       Row Name 06/24/25 1635          Pain    Pretreatment Pain Rating 8/10  -     Posttreatment Pain Rating 9/10  -     Pain Side/Orientation generalized  -       Row Name 06/24/25 1635          Plan of Care Review    Plan of Care Reviewed With patient;child  -     Outcome Evaluation Diane Guan is an 85 y.o. female with medical hx of CLL, CKD, DM, HLD, HTN and hypothyroidism who presented to [facility] on 6/24/25 after multiple falls. Pt lives with her spouse who has dementia in a [facility] with no SAMY. Pt has a , BSC, hospital bed and wheelchair available and is typically IND with mobility and ADLs, assists her  as needed. Today pt is A&Ox4. She is very painful but willing to attempt to get to EOB. She requires ModA of 2 supine to sit EOB and immediately becomes dizzy needing to return to supine. Pt below baseline at this time and not safe to return home. PT recommending SNF  upon d/c once medically stable. PT will follow and progress as tolerated.  -       Row Name 06/24/25 1635          Therapy Assessment/Plan (PT)    Rehab Potential (PT) good  -     Criteria for Skilled Interventions Met (PT) yes;skilled treatment is necessary  -     Therapy Frequency (PT) 5 times/wk  -       Row Name 06/24/25 1635          Vital Signs    Pre Systolic BP Rehab 126  -AH     Pre Treatment Diastolic BP 42  -AH     Post Systolic BP Rehab 134  -AH     Post Treatment Diastolic BP 83  -AH     Pre SpO2 (%) 94  -AH     O2 Delivery Pre Treatment supplemental O2  -AH     O2 Delivery Intra Treatment supplemental O2  -AH     O2 Delivery Post Treatment supplemental O2  -AH     Pre Patient Position Supine  -     Intra Patient Position Sitting  -     Post Patient Position Supine  -       Row Name 06/24/25 1635          Positioning and Restraints    Pre-Treatment Position in bed  -     Post Treatment Position bed  -AH     In Bed notified nsg;fowlers;call light within reach;encouraged to call for assist;with family/caregiver  -               User Key  (r) = Recorded By, (t) = Taken By, (c) = Cosigned By      Initials Name Provider Type     Kita Garcia, PT Physical Therapist                   Outcome Measures    No documentation.                                Physical Therapy Education       Title: PT OT SLP Therapies (In Progress)       Topic: Physical Therapy (In Progress)       Point: Mobility training (Done)       Learning Progress Summary            Patient Acceptance, E, VU by  at 6/24/2025 1644                      Point: Body mechanics (Done)       Learning Progress Summary            Patient Acceptance, E, VU by  at 6/24/2025 1644                      Point: Precautions (Not Started)       Learner Progress:  Not documented in this visit.                              User Key       Initials Effective Dates Name Provider Type [facility] 08/12/24 -  Kita Garcia  Adela, PT Physical Therapist PT                  PT Recommendation and Plan  Planned Therapy Interventions (PT): balance training, bed mobility training, gait training, transfer training, strengthening, patient/family education, postural re-education  Outcome Evaluation: Diane Guan is an 85 y.o. female with medical hx of CLL, CKD, DM, HLD, HTN and hypothyroidism who presented to [facility] on 6/24/25 after multiple falls. Pt lives with her spouse who has dementia in a [facility] with no SAMY. Pt has a , BSC, hospital bed and wheelchair available and is typically IND with mobility and ADLs, assists her  as needed. Today pt is A&Ox4. She is very painful but willing to attempt to get to EOB. She requires ModA of 2 supine to sit EOB and immediately becomes dizzy needing to return to supine. Pt below baseline at this time and not safe to return home. PT recommending SNF upon d/c once medically stable. PT will follow and progress as tolerated.     Time Calculation:         PT Charges       Row Name 06/24/25 1644             Time Calculation    Start Time 1435  -      Stop Time 1455  -      Time Calculation (min) 20 min  -      PT Received On 06/24/25  -      PT - Next Appointment 06/25/25  -      PT Goal Re-Cert Due Date 07/08/25  -                User Key  (r) = Recorded By, (t) = Taken By, (c) = Cosigned By      Initials Name Provider Type     Kita Garcia, PT Physical Therapist                  Therapy Charges for Today       Code Description Service Date Service Provider Modifiers Qty    [id number] HC PT EVAL MOD COMPLEXITY 4 6/24/2025 Kiat Garcia, PT GP 1               PT Discharge Summary  Anticipated Discharge Disposition (PT): skilled nursing facility    Kita Garcia, PT  6/24/2025

## 2025-06-24 NOTE — ED PROVIDER NOTES
Subjective   History of Present Illness  Patient is an 85-year-old female with past medical history significant for several types of cancer, diabetes, blood clots, hyperlipidemia, and hypertension presenting to the ED for evaluation of increased frequency of falls x 1 week.  Patient has been weak for many years, but she fell 3-4 times yesterday.  She reports that she waited in the floor from 11:30 PM to about 6 AM because she was unable to get up.  Patient has had decreased appetite.  Patient denies head trauma, neck pain, and isolated joint pain.  She reports that she is experiencing generalized lower extremity pain bilaterally.  Patient is on Eliquis.  She reports generalized weakness, nausea, increased urinary frequency, rhinorrhea, and cough.  She denies right elbow pain, vomiting, diarrhea, constipation, headache, head trauma, changes in bowel habits, dysuria, fever, body aches, shortness of breath, and chest pain.  Patient normally is a walker for ambulation at home.  She is having some intermittent pain in her bilateral ribs.  Patient denies saddle anesthesia and new incontinence        Review of Systems   Constitutional:  Negative for fever.   Gastrointestinal:  Negative for abdominal pain.   Genitourinary:  Positive for frequency. Negative for dysuria and hematuria.       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/2019    Depression     Diabetes mellitus     Disease of thyroid gland     Diverticulosis of colon 2018    Identified on colonoscopy    DVT (deep venous thrombosis)     Dysphagia 12/2020    Dyspnea on exertion     Fall at home 10/11/2023    Hemorrhoid     Hiatal hernia 12/2020    Hiatal hernia with GERD     large hiatal hernia with high-grade reflux on barium swallow; s/p laparoscopic  fundoplication with gastropexy    History of breast cancer 10/11/2023    History of cigarette smoking 10/11/2023    History of diabetes mellitus     no meds now    HL (hearing loss)     Hyperlipidemia     Hypertension     Knee swelling     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    aspiration pneumonia; completed abx therapy    OAB (overactive bladder)     Personal history of CLL (chronic lymphocytic leukemia) 10/11/2023    Pulmonary emboli     Skin cancer     Sleep apnea     daughter states pt does not have and doesn't have machine       No Known Allergies    Past Surgical History:   Procedure Laterality Date    BLADDER SURGERY      BREAST BIOPSY      BRONCHOSCOPY N/A 09/13/2019    Procedure: BRONCHOSCOPY with bronchial washing;  Surgeon: Marnie Frankel MD;  Location: [facility] ENDOSCOPY;  Service: Pulmonary    BRONCHOSCOPY Bilateral 10/18/2023    Procedure: BRONCHOSCOPY AT BEDSIDE;  Surgeon: Vinicius Heredia DO;  Location: [facility] ENDOSCOPY;  Service: Pulmonary;  Laterality: Bilateral;    COLONOSCOPY  07/19/2018    severe diverticulosis    CYST REMOVAL      Removed a fatty cyst off of her back    ENDOSCOPY N/A 11/23/2020    Procedure: ESOPHAGOGASTRODUODENOSCOPY with dilatation (Bougie # 48, 50, 52, 54, 56, 58);  Surgeon: Den Plummer DO;  Location: [facility] ENDOSCOPY;  Service: General;  Laterality: N/A;  Post: large hiatal hernia, joshua's ulcers    EYE SURGERY      HERNIA REPAIR      HIATAL HERNIA REPAIR N/A 12/30/2020    Procedure: Laparoscopic hiatal hernia repair with gastropexy;  Surgeon: Den Plummer DO;  Location: [facility] MAIN OR;  Service: General;  Laterality: N/A;    MASTECTOMY Right 02/04/2019    Invasive ductal carcinoma    TUBAL ABDOMINAL LIGATION         Family History   Problem Relation Age of Onset    Diabetes Mother     Arthritis Other     Heart disease Other     Arthritis Father     Hyperlipidemia Father     Miscarriages / Stillbirths Daughter        Social History     Socioeconomic History     Marital status:    Tobacco Use    Smoking status: Former     Current packs/day: 0.00     Average packs/day: 0.5 packs/day for 2.0 years (1.0 ttl pk-yrs)     Types: Cigarettes     Start date: 1990     Quit date: 1992     Years since quittin.5    Smokeless tobacco: Never   Vaping Use    Vaping status: Never Used   Substance and Sexual Activity    Alcohol use: No    Drug use: No    Sexual activity: Not Currently     Partners: Male           Objective   Physical Exam  Vitals and nursing note reviewed.   Constitutional:       General: She is not in acute distress.     Appearance: Normal appearance. She is normal weight. She is not ill-appearing, toxic-appearing or diaphoretic.   HENT:      Head: Normocephalic and atraumatic.      Right Ear: External ear normal.      Left Ear: External ear normal.      Nose: Congestion and rhinorrhea present.      Mouth/Throat:      Mouth: Mucous membranes are moist.   Eyes:      General: No scleral icterus.        Right eye: No discharge.         Left eye: No discharge.      Conjunctiva/sclera: Conjunctivae normal.      Pupils: Pupils are equal, round, and reactive to light.   Neck:      Vascular: No carotid bruit.   Cardiovascular:      Rate and Rhythm: Normal rate and regular rhythm.      Pulses: Normal pulses.      Heart sounds: Normal heart sounds. No murmur heard.     No friction rub. No gallop.   Pulmonary:      Effort: Pulmonary effort is normal. No respiratory distress.      Breath sounds: Normal breath sounds. No stridor. No wheezing, rhonchi or rales.   Chest:      Chest wall: No tenderness.   Abdominal:      General: Abdomen is flat. Bowel sounds are normal. There is no distension.      Palpations: Abdomen is soft.      Tenderness: There is no abdominal tenderness. There is no right CVA tenderness, left CVA tenderness, guarding or rebound.   Musculoskeletal:      Right elbow: No deformity. Normal range of motion. Tenderness present.      Left elbow: Normal.       Cervical back: Normal range of motion and neck supple. No rigidity or tenderness.      Comments: Strength equal bilaterally    Good distal pulses and normal capillary refill bilaterally in all 4 extremities    No cords or calf tenderness, negative Homans    No clonus    Bruising on the right elbow without deformity       Skin:     General: Skin is warm and dry.      Capillary Refill: Capillary refill takes 2 to 3 seconds.      Coloration: Skin is not jaundiced.      Findings: No bruising, erythema, lesion or rash.   Neurological:      General: No focal deficit present.      Mental Status: She is alert.      Sensory: No sensory deficit.      Coordination: Coordination normal.      Deep Tendon Reflexes: Reflexes normal.   Psychiatric:         Mood and Affect: Mood normal.         Behavior: Behavior normal.         Procedures           ED Course      Labs Reviewed   COMPREHENSIVE METABOLIC PANEL - Abnormal; Notable for the following components:       Result Value    Glucose 130 (*)     BUN 34.1 (*)     Creatinine 1.51 (*)     Sodium 133 (*)     CO2 15.2 (*)     Calcium 8.4 (*)     AST (SGOT) 35 (*)     eGFR 33.9 (*)     All other components within normal limits    Narrative:     GFR Categories in Chronic Kidney Disease (CKD)              GFR Category          GFR (mL/min/1.73)    Interpretation  G1                    90 or greater        Normal or high (1)  G2                    60-89                Mild decrease (1)  G3a                   45-59                Mild to moderate decrease  G3b                   30-44                Moderate to severe decrease  G4                    15-29                Severe decrease  G5                    14 or less           Kidney failure    (1)In the absence of evidence of kidney disease, neither GFR category G1 or G2 fulfill the criteria for CKD.    eGFR calculation 2021 CKD-EPI creatinine equation, which does not include race as a factor   LIPASE - Abnormal; Notable for the  following components:    Lipase 12 (*)     All other components within normal limits   PROTIME-INR - Abnormal; Notable for the following components:    Protime 15.9 (*)     INR 1.27 (*)     All other components within normal limits   URINALYSIS W/ CULTURE IF INDICATED - Abnormal; Notable for the following components:    Color, UA Dark Yellow (*)     Appearance, UA Cloudy (*)     Ketones, UA Trace (*)     Blood, UA Small (1+) (*)     Protein, UA 30 mg/dL (1+) (*)     Leuk Esterase, UA Moderate (2+) (*)     All other components within normal limits    Narrative:     In absence of clinical symptoms, the presence of pyuria, bacteria, and/or nitrites on the urinalysis result does not correlate with infection.   TROPONIN - Abnormal; Notable for the following components:    HS Troponin T 38 (*)     All other components within normal limits    Narrative:     High Sensitive Troponin T Reference Range:  <14.0 ng/L- Negative Female for AMI  <22.0 ng/L- Negative Male for AMI  >=14 - Abnormal Female indicating possible myocardial injury.  >=22 - Abnormal Male indicating possible myocardial injury.   Clinicians would have to utilize clinical acumen, EKG, Troponin, and serial changes to determine if it is an Acute Myocardial Infarction or myocardial injury due to an underlying chronic condition.        URINE DRUG SCREEN - Abnormal; Notable for the following components:    Opiate Screen Positive (*)     All other components within normal limits    Narrative:     Cutoff For Drugs Screened:    Amphetamines               500 ng/ml  Barbiturates               200 ng/ml  Benzodiazepines            150 ng/ml  Cocaine                    150 ng/ml  Methadone                  200 ng/ml  Opiates                    100 ng/ml  Phencyclidine               25 ng/ml  THC                         50 ng/ml  Methamphetamine            500 ng/ml  Tricyclic Antidepressants  300 ng/ml  Oxycodone                  100 ng/ml  Buprenorphine               10  ng/ml    The normal value for all drugs tested is negative. This report includes unconfirmed screening results, with the cutoff values listed, to be used for medical treatment purposes only.  Unconfirmed results must not be used for non-medical purposes such as employment or legal testing.  Clinical consideration should be applied to any drug of abuse test, particularly when unconfirmed results are used.    All urine drugs of abuse requests without chain of custody are for medical screening purposes only.  False positives are possible.     CK - Abnormal; Notable for the following components:    Creatine Kinase 348 (*)     All other components within normal limits   MAGNESIUM - Abnormal; Notable for the following components:    Magnesium 1.2 (*)     All other components within normal limits   TSH RFX ON ABNORMAL TO FREE T4 - Abnormal; Notable for the following components:    TSH 4.260 (*)     All other components within normal limits   CBC WITH AUTO DIFFERENTIAL - Abnormal; Notable for the following components:    WBC 24.40 (*)     RBC 3.62 (*)     Hemoglobin 10.0 (*)     Hematocrit 33.1 (*)     MCHC 30.2 (*)     RDW 16.1 (*)     MPV 12.3 (*)     Platelets 115 (*)     All other components within normal limits    Narrative:     The previously reported component NRBC is no longer being reported. Previous result was 0.0 /100 WBC (Reference Range: 0.0-0.2 /100 WBC) on 6/24/2025 at 1024 EDT.   MANUAL DIFFERENTIAL - Abnormal; Notable for the following components:    Neutrophil % 29.0 (*)     Lymphocyte % 65.0 (*)     Monocyte % 3.0 (*)     Neutrophils Absolute 7.81 (*)     Lymphocytes Absolute 15.86 (*)     All other components within normal limits   HIGH SENSITIVITIY TROPONIN T 1HR - Abnormal; Notable for the following components:    HS Troponin T 37 (*)     All other components within normal limits    Narrative:     High Sensitive Troponin T Reference Range:  <14.0 ng/L- Negative Female for AMI  <22.0 ng/L- Negative Male  for AMI  >=14 - Abnormal Female indicating possible myocardial injury.  >=22 - Abnormal Male indicating possible myocardial injury.   Clinicians would have to utilize clinical acumen, EKG, Troponin, and serial changes to determine if it is an Acute Myocardial Infarction or myocardial injury due to an underlying chronic condition.        URINALYSIS, MICROSCOPIC ONLY - Abnormal; Notable for the following components:    WBC, UA 21-50 (*)     Bacteria, UA 3+ (*)     All other components within normal limits   HEMOGLOBIN A1C - Abnormal; Notable for the following components:    Hemoglobin A1C 6.11 (*)     All other components within normal limits    Narrative:     Hemoglobin A1C Ranges:    Increased Risk for Diabetes  5.7% to 6.4%  Diabetes                     >= 6.5%  Diabetic Goal                < 7.0%   FERRITIN - Abnormal; Notable for the following components:    Ferritin 1,037.00 (*)     All other components within normal limits    Narrative:     Results may be falsely decreased if patient taking Biotin.     POCT GLUCOSE FINGERSTICK - Abnormal; Notable for the following components:    Glucose 111 (*)     All other components within normal limits   RESPIRATORY PANEL PCR W/ COVID-19 (SARS-COV-2), NP SWAB IN UTM/VTP, 2 HR TAT - Normal    Narrative:     In the setting of a positive respiratory panel with a viral infection PLUS a negative procalcitonin without other underlying concern for bacterial infection, consider observing off antibiotics or discontinuation of antibiotics and continue supportive care. If the respiratory panel is positive for atypical bacterial infection (Bordetella pertussis, Chlamydophila pneumoniae, or Mycoplasma pneumoniae), consider antibiotic de-escalation to target atypical bacterial infection.   APTT - Normal   BNP (IN-HOUSE) - Normal    Narrative:     This assay is used as an aid in the diagnosis of individuals suspected of having heart failure. It can be used as an aid in the diagnosis of  acute decompensated heart failure (ADHF) in patients presenting with signs and symptoms of ADHF to the emergency department (ED). In addition, NT-proBNP of <300 pg/mL indicates ADHF is not likely.    Age Range Result Interpretation  NT-proBNP Concentration (pg/mL:      <50             Positive            >450                   Gray                 300-450                    Negative             <300    50-75           Positive            >900                  Gray                300-900                  Negative            <300      >75             Positive            >1800                  Gray                300-1800                  Negative            <300   PHOSPHORUS - Normal   T4, FREE - Normal   POC LACTATE - Normal   BLOOD CULTURE   BLOOD CULTURE   URINE CULTURE   ETHANOL    Narrative:     Not for legal purposes.   SCAN SLIDE   PATH CONSULT REFLEX   VITAMIN D,25-HYDROXY   VITAMIN B12   FOLATE   CK   POCT GLUCOSE FINGERSTICK   POCT GLUCOSE FINGERSTICK   POCT GLUCOSE FINGERSTICK   POCT GLUCOSE FINGERSTICK   PATHOLOGY CONSULTATION   CBC AND DIFFERENTIAL    Narrative:     The following orders were created for panel order CBC & Differential.  Procedure                               Abnormality         Status                     ---------                               -----------         ------                     CBC Auto Differential[[id number]]        Abnormal            Final result               Scan Slide[[id number]]                                       Final result                 Please view results for these tests on the individual orders.     XR Chest 1 View  Result Date: 6/24/2025  Impression: Chest: No acute process. Right elbow: Negative for fracture. Electronically Signed: Pavan Amezquita MD  6/24/2025 10:37 AM EDT  Workstation ID: [id number]    XR Elbow 3+ View Right  Result Date: 6/24/2025  Impression: Chest: No acute process. Right elbow: Negative for fracture. Electronically Signed: Pavan Amezquita MD   6/24/2025 10:37 AM EDT  Workstation ID: [id number]    Medications   sodium chloride 0.9 % flush 10 mL (has no administration in time range)   dextrose 5 % and lactated Ringer's infusion (has no administration in time range)   cefTRIAXone (ROCEPHIN) 1,000 mg in sodium chloride 0.9 % 100 mL MBP (has no administration in time range)   amLODIPine (NORVASC) tablet 10 mg (has no administration in time range)   apixaban (ELIQUIS) tablet 2.5 mg (has no administration in time range)   levothyroxine (SYNTHROID, LEVOTHROID) tablet 100 mcg (has no administration in time range)   metoclopramide (REGLAN) tablet 5 mg (has no administration in time range)   sertraline (ZOLOFT) tablet 150 mg (has no administration in time range)   rosuvastatin (CRESTOR) tablet 10 mg (has no administration in time range)   rOPINIRole (REQUIP) tablet 0.25 mg (has no administration in time range)   pantoprazole (PROTONIX) EC tablet 40 mg (has no administration in time range)   mirtazapine (REMERON) tablet 15 mg (has no administration in time range)   ferrous sulfate tablet 325 mg (has no administration in time range)   docusate sodium (COLACE) capsule 100 mg (has no administration in time range)   dextrose (GLUTOSE) oral gel 15 g (has no administration in time range)   dextrose (D50W) (25 g/50 mL) IV injection 25 g (has no administration in time range)   glucagon (GLUCAGEN) injection 1 mg (has no administration in time range)   insulin lispro (HUMALOG/ADMELOG) injection 2-7 Units (has no administration in time range)   sodium chloride 0.9 % flush 10 mL (has no administration in time range)   sodium chloride 0.9 % flush 10 mL (has no administration in time range)   sodium chloride 0.9 % infusion 40 mL (has no administration in time range)   Potassium Replacement - Follow Nurse / BPA Driven Protocol (has no administration in time range)   Magnesium Standard Dose Replacement - Follow Nurse / BPA Driven Protocol (has no administration in time range)    Phosphorus Replacement - Follow Nurse / BPA Driven Protocol (has no administration in time range)   Calcium Replacement - Follow Nurse / BPA Driven Protocol (has no administration in time range)   acetaminophen (TYLENOL) tablet 650 mg (has no administration in time range)     Or   acetaminophen (TYLENOL) 160 MG/5ML oral solution 650 mg (has no administration in time range)     Or   acetaminophen (TYLENOL) suppository 650 mg (has no administration in time range)   sodium chloride 0.9 % bolus 1,000 mL (1,000 mL Intravenous New Bag 6/24/25 1230)   cefTRIAXone (ROCEPHIN) 1,000 mg in sodium chloride 0.9 % 100 mL MBP (1,000 mg Intravenous New Bag 6/24/25 1230)                                                      Medical Decision Making  Patient is an 85-year-old female who presented with the above complaint.  She had the above evaluation and exam.  Patient was placed on the monitor and IV was established.      EKG independently interpreted by radiology and reviewed by myself.  Sinus rhythm with PAC, rate 78, QT interval 374.  No significant change morphologically as compared to prior on 3/12/2025.    Imaging independently interpreted from radiology and reviewed by myself.  Right elbow x-ray and chest x-ray were negative for acute process.    Labs showed normal lactate 0.3.  Blood cultures pending.  PT 15.9, INR 1.27, PTT 29.9.  Urinalysis showed 21-50 white blood cells with 3+ bacteria.  Elevated TSH with normal free T4.  White count 24.4, hemoglobin 10.0, platelets 115,000.  Consult reflexed.  Creatinine 1.51, GFR 33.9.  Anion gap 14.8.  Ethanol was negative, and patient tested positive for opioids on UDS.  Magnesium, lipase, phosphorus, BNP all grossly unremarkable.  .  Respiratory panel was negative.  Initial troponin was 38 with a 1 hour repeat of 37.    Patient was given Rocephin and IV fluids in the ED.  At reassessment, she is resting comfortably in no acute distress.  She is neurovascularly intact and  hemodynamically stable.  She is experiencing some generalized pain from the fall without any focal pain.  Patient has had several falls over the last couple of days and spent several hours on the floor overnight because she was unable to get up.  Patient denied any head trauma and had no evidence of head trauma on physical exam.  Patient was discussed with a member of the hospitalist team, who agrees to admit for further management.      Problems Addressed:  Anemia, unspecified type: complicated acute illness or injury  Frequent falls: complicated acute illness or injury  Leukocytosis, unspecified type: complicated acute illness or injury  Right elbow pain: complicated acute illness or injury  Thrombocytopenia: complicated acute illness or injury  Urinary tract infection with hematuria, site unspecified: complicated acute illness or injury    Amount and/or Complexity of Data Reviewed  Labs: ordered.  Radiology: ordered.  ECG/medicine tests: ordered.    Risk  Prescription drug management.  Decision regarding hospitalization.        Final diagnoses:   Anemia, unspecified type   Leukocytosis, unspecified type   Frequent falls   Thrombocytopenia   Urinary tract infection with hematuria, site unspecified   Right elbow pain       ED Disposition  ED Disposition       ED Disposition   Decision to Admit    Condition   --    Comment   Level of Care: Med/Surg [1]   Diagnosis: UTI (urinary tract infection) [526020]   Admitting Physician: NELLIE FORBES [[id number]]   Attending Physician: NELLIE FORBES [[id number]]   Certification: I Certify That Inpatient Hospital Services Are Medically Necessary For Greater Than 2 Midnights                 No follow-up provider specified.       Medication List        ASK your doctor about these medications      pantoprazole 40 MG EC tablet  Commonly known as: PROTONIX  Ask about: Which instructions should I use?     rOPINIRole 0.25 MG tablet  Commonly known as: REQUIP  Ask about: Which  instructions should I use?                 Kate Valdez PA-C  06/24/25 [id number]

## 2025-06-24 NOTE — Clinical Note
Level of Care: Med/Surg [1]   Admitting Physician: NELLIE FORBES [[id number]]   Attending Physician: NELLIE FORBES [[id number]]

## 2025-06-24 NOTE — H&P
[facility] Medicine Services  History & Physical    Patient Name: Diane Guan  : 1940  MRN: [id number]  Primary Care Physician:  Asia Queen APRN  Date of admission: 2025  Date and Time of Service: 2025 at  1300    Subjective      Chief Complaint:  recurrent falls    History of Present Illness: Diane Guan is a 85 y.o. female with a CMH of CLL, CKD stage 3B, T2DM, HLD, HTN, Hypothyroidism, and hx of DVT on eliquis who presented to [facility] on 2025 with generalized weakness and increased falls x 1 week. Patient has been weak for many years, but she fell 3-4 times yesterday. She reports that she waited in the floor from 11:30 PM to about 6 AM because she was unable to get up. Pt does live alone. Patient has had decreased appetite. Patient denies head trauma, neck pain, LOC or isolated joint pain. She reports that she is experiencing generalized lower extremity pain bilaterally. Patient is on Eliquis. She reports generalized weakness, nausea, increased urinary frequency, rhinorrhea, and cough. She denies vomiting, diarrhea, constipation, headache, head trauma, changes in bowel habits, dysuria, fever, body aches, shortness of breath, and chest pain. Patient normally is a walker for ambulation at home. She is having some intermittent pain in her bilateral ribs. Patient denies saddle anesthesia and new incontinence.    Review of Systems 10 point review of systems neg except for above     Personal History     Past Medical History:   Diagnosis Date    Acute bronchitis due to human metapneumovirus 2020    Anxiety disorder     Arthritis     Arthritis of back     Breast cancer 2019    Invasive ductal carcinoma    Chronic lymphocytic leukemia (CLL), B-cell 2019    Depression     Diabetes mellitus     Disease of thyroid gland     Diverticulosis of colon     Identified on colonoscopy    DVT (deep venous thrombosis)     Dysphagia 2020    Dyspnea on  exertion     Fall at home 10/11/2023    Hemorrhoid     Hiatal hernia 12/2020    Hiatal hernia with GERD     large hiatal hernia with high-grade reflux on barium swallow; s/p laparoscopic fundoplication with gastropexy    History of breast cancer 10/11/2023    History of cigarette smoking 10/11/2023    History of diabetes mellitus     no meds now    HL (hearing loss)     Hyperlipidemia     Hypertension     Knee swelling     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    aspiration pneumonia; completed abx therapy    OAB (overactive bladder)     Personal history of CLL (chronic lymphocytic leukemia) 10/11/2023    Pulmonary emboli     Skin cancer     Sleep apnea     daughter states pt does not have and doesn't have machine       Past Surgical History:   Procedure Laterality Date    BLADDER SURGERY      BREAST BIOPSY      BRONCHOSCOPY N/A 09/13/2019    Procedure: BRONCHOSCOPY with bronchial washing;  Surgeon: Marnie Frankel MD;  Location: [facility] ENDOSCOPY;  Service: Pulmonary    BRONCHOSCOPY Bilateral 10/18/2023    Procedure: BRONCHOSCOPY AT BEDSIDE;  Surgeon: Vinicius Heredia DO;  Location: [facility] ENDOSCOPY;  Service: Pulmonary;  Laterality: Bilateral;    COLONOSCOPY  07/19/2018    severe diverticulosis    CYST REMOVAL      Removed a fatty cyst off of her back    ENDOSCOPY N/A 11/23/2020    Procedure: ESOPHAGOGASTRODUODENOSCOPY with dilatation (Bougie # 48, 50, 52, 54, 56, 58);  Surgeon: Den Plummer DO;  Location: [facility] ENDOSCOPY;  Service: General;  Laterality: N/A;  Post: large hiatal hernia, joshua's ulcers    EYE SURGERY      HERNIA REPAIR      HIATAL HERNIA REPAIR N/A 12/30/2020    Procedure: Laparoscopic hiatal hernia repair with gastropexy;  Surgeon: Den Plummer DO;  Location: [facility] MAIN OR;  Service: General;  Laterality: N/A;    MASTECTOMY Right 02/04/2019    Invasive ductal carcinoma    TUBAL ABDOMINAL LIGATION         Family History: family history includes Arthritis in her father and another family  member; Diabetes in her mother; Heart disease in an other family member; Hyperlipidemia in her father; Miscarriages / Stillbirths in her daughter. Otherwise pertinent FHx was reviewed and not pertinent to current issue.    Social History:  reports that she quit smoking about 33 years ago. Her smoking use included cigarettes. She started smoking about 35 years ago. She has a 1 pack-year smoking history. She has never used smokeless tobacco. She reports that she does not drink alcohol and does not use drugs.    Home Medications:  Prior to Admission Medications       Prescriptions Last Dose Informant Patient Reported? Taking?    amLODIPine (NORVASC) 10 MG tablet 6/24/2025  No Yes    Take 1 tablet by mouth Daily. for high blood pressure    apixaban (ELIQUIS) 2.5 MG tablet tablet 6/24/2025  No Yes    Take 1 tablet by mouth 2 (Two) Times a Day.    chlorthalidone (HYGROTON) 25 MG tablet 6/24/2025  Yes Yes    Take 1 tablet by mouth Daily.    docusate sodium (COLACE) 100 MG capsule 6/24/2025  Yes Yes    Take 1 capsule by mouth 2 (Two) Times a Day. Indications: Constipation    ferrous sulfate 324 (65 Fe) MG tablet delayed-release EC tablet   Yes Yes    Take 1 tablet by mouth 3 (Three) Times a Week. On Monday, Wednesday, and Friday  Indications: Iron Deficiency    irbesartan (AVAPRO) 300 MG tablet 6/23/2025  Yes Yes    Take 1 tablet by mouth Every Night.    levothyroxine (SYNTHROID, LEVOTHROID) 100 MCG tablet 6/24/2025  No Yes    TAKE 1 TABLET BY MOUTH EVERY DAY    metFORMIN (GLUCOPHAGE) 500 MG tablet 6/24/2025  No Yes    TAKE 1 TABLET BY MOUTH EVERY DAY WITH BREAKFAST    mirtazapine (REMERON) 15 MG tablet 6/23/2025  No Yes    TAKE 1 TABLET BY MOUTH EVERYDAY AT BEDTIME    pantoprazole (PROTONIX) 40 MG EC tablet 6/24/2025  Yes Yes    Take 1 tablet by mouth Daily.    rOPINIRole (REQUIP) 0.25 MG tablet 6/23/2025  Yes Yes    Take 1 tablet by mouth Every Night. Take 1 hour before bedtime.    rosuvastatin (CRESTOR) 10 MG tablet  6/23/2025  No Yes    TAKE 1 TABLET BY MOUTH EVERY DAY AT NIGHT    sertraline (ZOLOFT) 100 MG tablet 6/23/2025  No Yes    TAKE 1 AND 1/2 TABLETS BY MOUTH EVERY DAY    metoclopramide (REGLAN) 5 MG tablet   No No    TAKE 1 TABLET BY MOUTH 3 (THREE) TIMES A DAY AS NEEDED (FOR NAUSEA AND VOMITING).              Allergies:  No Known Allergies    Objective      Vitals:   Temp:  [98.1 °F (36.7 °C)] 98.1 °F (36.7 °C)  Heart Rate:  [72-88] 74  Resp:  [17-19] 18  BP: (112-127)/(53-61) 127/56  Body mass index is 28.7 kg/m².  Physical Exam  Vitals reviewed.   Constitutional:       Appearance: Normal appearance.      Interventions: Nasal cannula in place.   HENT:      Head: Normocephalic and atraumatic.   Eyes:      Extraocular Movements: Extraocular movements intact.      Pupils: Pupils are equal, round, and reactive to light.   Cardiovascular:      Rate and Rhythm: Normal rate and regular rhythm.      Pulses: Normal pulses.      Heart sounds: Normal heart sounds.   Pulmonary:      Effort: Pulmonary effort is normal.      Breath sounds: Normal breath sounds.   Abdominal:      General: Abdomen is flat. Bowel sounds are normal.      Palpations: Abdomen is soft.   Musculoskeletal:         General: Signs of injury present. Normal range of motion.      Cervical back: Normal range of motion.      Comments: Scattered ecchymosis on R elbow   Skin:     General: Skin is warm and dry.   Neurological:      General: No focal deficit present.      Mental Status: She is alert and oriented to person, place, and time. Mental status is at baseline.   Psychiatric:         Mood and Affect: Mood normal.         Behavior: Behavior normal.         Diagnostic Data:  Lab Results (last 24 hours)       Procedure Component Value Units Date/Time    Ferritin [[id number]] Collected: 06/24/25 1133    Specimen: Blood Updated: 06/24/25 1252    High Sensitivity Troponin T 1Hr [[id number]]  (Abnormal) Collected: 06/24/25 1133    Specimen: Blood Updated: 06/24/25  1156     HS Troponin T 37 ng/L      Troponin T Numeric Delta -1 ng/L      Troponin T % Delta -3    Narrative:      High Sensitive Troponin T Reference Range:  <14.0 ng/L- Negative Female for AMI  <22.0 ng/L- Negative Male for AMI  >=14 - Abnormal Female indicating possible myocardial injury.  >=22 - Abnormal Male indicating possible myocardial injury.   Clinicians would have to utilize clinical acumen, EKG, Troponin, and serial changes to determine if it is an Acute Myocardial Infarction or myocardial injury due to an underlying chronic condition.         Protime-INR [[id number]]  (Abnormal) Collected: 06/24/25 1133    Specimen: Blood Updated: 06/24/25 1152     Protime 15.9 Seconds      INR 1.27    aPTT [[id number]]  (Normal) Collected: 06/24/25 1133    Specimen: Blood Updated: 06/24/25 1152     PTT 29.9 seconds     Urine Drug Screen - Urine, Clean Catch [[id number]]  (Abnormal) Collected: 06/24/25 1049    Specimen: Urine, Clean Catch Updated: 06/24/25 1152     THC, Screen, Urine Negative     Phencyclidine (PCP), Urine Negative     Cocaine Screen, Urine Negative     Methamphetamine, Ur Negative     Opiate Screen Positive     Amphetamine Screen, Urine Negative     Benzodiazepine Screen, Urine Negative     Tricyclic Antidepressants Screen Negative     Methadone Screen, Urine Negative     Barbiturates Screen, Urine Negative     Oxycodone Screen, Urine Negative     Buprenorphine, Screen, Urine Negative    Narrative:      Cutoff For Drugs Screened:    Amphetamines               500 ng/ml  Barbiturates               200 ng/ml  Benzodiazepines            150 ng/ml  Cocaine                    150 ng/ml  Methadone                  200 ng/ml  Opiates                    100 ng/ml  Phencyclidine               25 ng/ml  THC                         50 ng/ml  Methamphetamine            500 ng/ml  Tricyclic Antidepressants  300 ng/ml  Oxycodone                  100 ng/ml  Buprenorphine               10 ng/ml    The normal value for  all drugs tested is negative. This report includes unconfirmed screening results, with the cutoff values listed, to be used for medical treatment purposes only.  Unconfirmed results must not be used for non-medical purposes such as employment or legal testing.  Clinical consideration should be applied to any drug of abuse test, particularly when unconfirmed results are used.    All urine drugs of abuse requests without chain of custody are for medical screening purposes only.  False positives are possible.      T4, Free [[id number]]  (Normal) Collected: 06/24/25 1012    Specimen: Blood Updated: 06/24/25 1149     Free T4 1.05 ng/dL     Urinalysis, Microscopic Only - Urine, Clean Catch [[id number]]  (Abnormal) Collected: 06/24/25 1049    Specimen: Urine, Clean Catch Updated: 06/24/25 1144     RBC, UA None Seen /HPF      WBC, UA 21-50 /HPF      Bacteria, UA 3+ /HPF      Squamous Epithelial Cells, UA None Seen /HPF      Hyaline Casts, UA None Seen /LPF      Methodology Manual Light Microscopy    Urine Culture - Urine, Urine, Clean Catch [[id number]] Collected: 06/24/25 1049    Specimen: Urine, Clean Catch Updated: 06/24/25 1144    POC Lactate [[id number]]  (Normal) Collected: 06/24/25 1137    Specimen: Blood Updated: 06/24/25 1138     Lactate 0.3 mmol/L      Comment: Serial Number: [id number]:  [id number]       Blood Culture - Blood, Arm, Left [[id number]] Collected: 06/24/25 1133    Specimen: Blood from Arm, Left Updated: 06/24/25 1136    Respiratory Panel PCR w/COVID-19(SARS-CoV-2) LESLEE/VIVIANE/PAVAN/PAD/COR/JUAN DAVID In-House, NP Swab in UTM/VTM, 2 HR TAT - Swab, Nasopharynx [[id number]]  (Normal) Collected: 06/24/25 1012    Specimen: Swab from Nasopharynx Updated: 06/24/25 1119     ADENOVIRUS, PCR Not Detected     Coronavirus 229E Not Detected     Coronavirus HKU1 Not Detected     Coronavirus NL63 Not Detected     Coronavirus OC43 Not Detected     COVID19 Not Detected     Human Metapneumovirus Not Detected     Human  Rhinovirus/Enterovirus Not Detected     Influenza A PCR Not Detected     Influenza B PCR Not Detected     Parainfluenza Virus 1 Not Detected     Parainfluenza Virus 2 Not Detected     Parainfluenza Virus 3 Not Detected     Parainfluenza Virus 4 Not Detected     RSV, PCR Not Detected     Bordetella pertussis pcr Not Detected     Bordetella parapertussis PCR Not Detected     Chlamydophila pneumoniae PCR Not Detected     Mycoplasma pneumo by PCR Not Detected    Narrative:      In the setting of a positive respiratory panel with a viral infection PLUS a negative procalcitonin without other underlying concern for bacterial infection, consider observing off antibiotics or discontinuation of antibiotics and continue supportive care. If the respiratory panel is positive for atypical bacterial infection (Bordetella pertussis, Chlamydophila pneumoniae, or Mycoplasma pneumoniae), consider antibiotic de-escalation to target atypical bacterial infection.    Urinalysis With Culture If Indicated - Urine, Clean Catch [[id number]]  (Abnormal) Collected: 06/24/25 1049    Specimen: Urine, Clean Catch Updated: 06/24/25 1111     Color, UA Dark Yellow     Appearance, UA Cloudy     pH, UA <=5.0     Specific Gravity, UA 1.016     Glucose, UA Negative     Ketones, UA Trace     Bilirubin, UA Negative     Blood, UA Small (1+)     Protein, UA 30 mg/dL (1+)     Leuk Esterase, UA Moderate (2+)     Nitrite, UA Negative     Urobilinogen, UA 1.0 E.U./dL    Narrative:      In absence of clinical symptoms, the presence of pyuria, bacteria, and/or nitrites on the urinalysis result does not correlate with infection.    Lipase [[id number]]  (Abnormal) Collected: 06/24/25 1012    Specimen: Blood Updated: 06/24/25 1055     Lipase 12 U/L     BNP [[id number]]  (Normal) Collected: 06/24/25 1012    Specimen: Blood Updated: 06/24/25 1055     proBNP 995.0 pg/mL     Narrative:      This assay is used as an aid in the diagnosis of individuals suspected of having  heart failure. It can be used as an aid in the diagnosis of acute decompensated heart failure (ADHF) in patients presenting with signs and symptoms of ADHF to the emergency department (ED). In addition, NT-proBNP of <300 pg/mL indicates ADHF is not likely.    Age Range Result Interpretation  NT-proBNP Concentration (pg/mL:      <50             Positive            >450                   Gray                 300-450                    Negative             <300    50-75           Positive            >900                  Gray                300-900                  Negative            <300      >75             Positive            >1800                  Gray                300-1800                  Negative            <300    High Sensitivity Troponin T [[id number]]  (Abnormal) Collected: 06/24/25 1012    Specimen: Blood Updated: 06/24/25 1055     HS Troponin T 38 ng/L     Narrative:      High Sensitive Troponin T Reference Range:  <14.0 ng/L- Negative Female for AMI  <22.0 ng/L- Negative Male for AMI  >=14 - Abnormal Female indicating possible myocardial injury.  >=22 - Abnormal Male indicating possible myocardial injury.   Clinicians would have to utilize clinical acumen, EKG, Troponin, and serial changes to determine if it is an Acute Myocardial Infarction or myocardial injury due to an underlying chronic condition.         CK [[id number]]  (Abnormal) Collected: 06/24/25 1012    Specimen: Blood Updated: 06/24/25 1055     Creatine Kinase 348 U/L     TSH Rfx On Abnormal To Free T4 [[id number]]  (Abnormal) Collected: 06/24/25 1012    Specimen: Blood Updated: 06/24/25 1055     TSH 4.260 uIU/mL     Comprehensive Metabolic Panel [[id number]]  (Abnormal) Collected: 06/24/25 1012    Specimen: Blood Updated: 06/24/25 1055     Glucose 130 mg/dL      BUN 34.1 mg/dL      Creatinine 1.51 mg/dL      Sodium 133 mmol/L      Potassium 4.1 mmol/L      Chloride 103 mmol/L      CO2 15.2 mmol/L      Calcium 8.4 mg/dL      Total Protein 6.1  g/dL      Albumin 3.6 g/dL      ALT (SGPT) 19 U/L      AST (SGOT) 35 U/L      Alkaline Phosphatase 84 U/L      Total Bilirubin 0.6 mg/dL      Globulin 2.5 gm/dL      A/G Ratio 1.4 g/dL      BUN/Creatinine Ratio 22.6     Anion Gap 14.8 mmol/L      eGFR 33.9 mL/min/1.73     Narrative:      GFR Categories in Chronic Kidney Disease (CKD)              GFR Category          GFR (mL/min/1.73)    Interpretation  G1                    90 or greater        Normal or high (1)  G2                    60-89                Mild decrease (1)  G3a                   45-59                Mild to moderate decrease  G3b                   30-44                Moderate to severe decrease  G4                    15-29                Severe decrease  G5                    14 or less           Kidney failure    (1)In the absence of evidence of kidney disease, neither GFR category G1 or G2 fulfill the criteria for CKD.    eGFR calculation 2021 CKD-EPI creatinine equation, which does not include race as a factor    Ethanol [[id number]] Collected: 06/24/25 1012    Specimen: Blood Updated: 06/24/25 1055     Ethanol % <0.010 %     Narrative:      Not for legal purposes.    Magnesium [[id number]]  (Abnormal) Collected: 06/24/25 1012    Specimen: Blood Updated: 06/24/25 1055     Magnesium 1.2 mg/dL     Phosphorus [[id number]]  (Normal) Collected: 06/24/25 1012    Specimen: Blood Updated: 06/24/25 1055     Phosphorus 2.9 mg/dL     CBC & Differential [[id number]]  (Abnormal) Collected: 06/24/25 1012    Specimen: Blood Updated: 06/24/25 1049    Narrative:      The following orders were created for panel order CBC & Differential.  Procedure                               Abnormality         Status                     ---------                               -----------         ------                     CBC Auto Differential[[id number]]        Abnormal            Final result               Scan Slide[[id number]]                                       Final  result                 Please view results for these tests on the individual orders.    Manual Differential [[id number]]  (Abnormal) Collected: 06/24/25 1012    Specimen: Blood Updated: 06/24/25 1049     Neutrophil % 29.0 %      Lymphocyte % 65.0 %      Monocyte % 3.0 %      Bands %  3.0 %      Neutrophils Absolute 7.81 10*3/mm3      Lymphocytes Absolute 15.86 10*3/mm3      Monocytes Absolute 0.73 10*3/mm3      Elliptocytes Slight/1+     Poikilocytes Slight/1+     Smudge Cells Mod/2+     Platelet Estimate Decreased     Giant Platelets Slight/1+    Path Consult Reflex [[id number]] Collected: 06/24/25 1012    Specimen: Blood Updated: 06/24/25 1049     Pathology Review Yes    CBC Auto Differential [[id number]]  (Abnormal) Collected: 06/24/25 1012    Specimen: Blood Updated: 06/24/25 1049     WBC 24.40 10*3/mm3      RBC 3.62 10*6/mm3      Hemoglobin 10.0 g/dL      Hematocrit 33.1 %      MCV 91.4 fL      MCH 27.6 pg      MCHC 30.2 g/dL      RDW 16.1 %      RDW-SD 53.4 fl      MPV 12.3 fL      Platelets 115 10*3/mm3     Narrative:      The previously reported component NRBC is no longer being reported. Previous result was 0.0 /100 WBC (Reference Range: 0.0-0.2 /100 WBC) on 6/24/2025 at 1024 EDT.    Scan Slide [[id number]] Collected: 06/24/25 1012    Specimen: Blood Updated: 06/24/25 1049     Scan Slide --     Comment: See Manual Differential Results       Blood Culture - Blood, Arm, Left [[id number]] Collected: 06/24/25 1012    Specimen: Blood from Arm, Left Updated: 06/24/25 1019             Imaging Results (Last 24 Hours)       Procedure Component Value Units Date/Time    XR Chest 1 View [[id number]] Collected: 06/24/25 1034     Updated: 06/24/25 1039    Narrative:      XR CHEST 1 VW, XR ELBOW 3+ VW RIGHT    Date of Exam: 6/24/2025 10:18 AM EDT    Indication: falls with rib pain    Comparison: 3/11/2025    Findings:  Chest:  Heart is mildly enlarged. There is mild elevation of the right hemidiaphragm similar to the prior  studies. Negative for pneumothorax or effusion. No rib fracture within limits of single frontal radiograph. Aortic arch atherosclerosis. Osseous structures   appear intact.    Right elbow:  Negative for fracture or dislocation. Mild degenerative spurring at the medial epicondyle. No evidence of elbow joint effusion although lateral view is partially limited due to positioning and obliquity.      Impression:      Impression:  Chest:  No acute process.    Right elbow:  Negative for fracture.          Electronically Signed: Pavan Amezquita MD    6/24/2025 10:37 AM EDT    Workstation ID: [id number]    XR Elbow 3+ View Right [[id number]] Collected: 06/24/25 1034     Updated: 06/24/25 1039    Narrative:      XR CHEST 1 VW, XR ELBOW 3+ VW RIGHT    Date of Exam: 6/24/2025 10:18 AM EDT    Indication: falls with rib pain    Comparison: 3/11/2025    Findings:  Chest:  Heart is mildly enlarged. There is mild elevation of the right hemidiaphragm similar to the prior studies. Negative for pneumothorax or effusion. No rib fracture within limits of single frontal radiograph. Aortic arch atherosclerosis. Osseous structures   appear intact.    Right elbow:  Negative for fracture or dislocation. Mild degenerative spurring at the medial epicondyle. No evidence of elbow joint effusion although lateral view is partially limited due to positioning and obliquity.      Impression:      Impression:  Chest:  No acute process.    Right elbow:  Negative for fracture.          Electronically Signed: Pavan Amezquita MD    6/24/2025 10:37 AM EDT    Workstation ID: [id number]              Assessment & Plan        This is a 85 y.o. female with:    Active and Resolved Problems  Active Hospital Problems    Diagnosis  POA    **UTI (urinary tract infection) [N39.0]  Yes      Resolved Hospital Problems   No resolved problems to display.       Impression: Recurrent Falls  Self-care deficit  UTI  CLL  CKD stage 3B  T2DM  HLD  HTN  Hypothyroidism  hx of DVT on  eliquis    Plan: Admit to [facility]. ED records, labs, and imaging reviewed.     Recurrent Falls  Self-care deficit  UTI  -PT/OT consulted to work with pt and ensure safe dispo. She will likely need placement in a facility as she lives alone  -Similar presentation 3/11/2025 patient was admitted by hospitalist team with generalized weakness and found to have a UTI.  Urine culture grew Klebsiella, susceptible to Rocephin.  Repeat culture a day later with mixed franci. Blood cultures at that time with No growth  -UA today with dark yellow cloudy appearance, trace ketonuria small blood moderate leukocyte esterase, proteinuria 21-50 WBCs, and 3+ bacteria  - Patient started on Rocephin in the ER, will continue for 4 additional days  - Urine culture and blood cultures pending    CLL: f/u with hematology outpatient  - WBC 24.40, hemoglobin 10, platelets 115, lab abnormalities are consistent with previous labs  -follows with Dr. Bolden  -has not required any treatment since her diagnosis  -receives Venofer infusions outpatient    CKD stage 3B: Creatinine 1.51 on arrival, estimated GFR 33.9.  Near baseline. Mild bump in creatinine  - s/p 1 L IVF  -Continue D5LR for starvation ketosis  -Avoid nephrotoxic medication and contrast  -Avoid hypotension  -Hold home Chlorthalidone, Irbesartan    T2DM: A1c pending  - Holding home antihyperglycemics  - Low-dose sliding scale insulin  - Hypoglycemia protocol ordered    HLD: Continue home statin    HTN: Patient has remained normotensive in the ER  - Continue home amlodipine    Hypothyroidism: TSH mildly elevated at 4.260, free T4 within normal limits  - Continue home Synthroid    hx of DVT and PE on eliquis: Continue home Eliquis  - diagnosed July 2024, dose reduced to 2.5mg BID    VTE Prophylaxis:  Pharmacologic VTE prophylaxis orders are present.        The patient desires to be as follows:    CODE STATUS:    Code Status (Patient has no pulse and is not breathing): CPR (Attempt to  Resuscitate)  Medical Interventions (Patient has pulse or is breathing): Full Support  Level Of Support Discussed With: Patient        Monika, daughter, who can be contacted at [phone], is the designated person to make medical decisions on the patient's behalf if She is incapable of doing so. This was clarified with patient and/or next of kin on 6/24/2025 during the course of this H&P.    Admission Status:  I believe this patient meets inpatient status.    Expected Length of Stay: 5 days    PDMP and Medication Dispenses via Sidebar reviewed and consistent with patient reported medications.    I discussed the patient's findings and my recommendations with patient.      Signature:     This document has been electronically signed by Martha Zhong PA-C on June 24, 2025 12:53 EDT   [facility] Hospitalist Team

## 2025-06-25 ENCOUNTER — APPOINTMENT (OUTPATIENT)
Dept: ULTRASOUND IMAGING | Facility: HOSPITAL | Age: 85
DRG: 690 | End: 2025-06-25
Payer: MEDICARE

## 2025-06-25 ENCOUNTER — APPOINTMENT (OUTPATIENT)
Dept: GENERAL RADIOLOGY | Facility: HOSPITAL | Age: 85
DRG: 690 | End: 2025-06-25
Payer: MEDICARE

## 2025-06-25 ENCOUNTER — APPOINTMENT (OUTPATIENT)
Dept: CT IMAGING | Facility: HOSPITAL | Age: 85
DRG: 690 | End: 2025-06-25
Payer: MEDICARE

## 2025-06-25 LAB
ABO GROUP BLD: NORMAL
ANION GAP SERPL CALCULATED.3IONS-SCNC: 13.2 MMOL/L (ref 5–15)
BACTERIA BLD CULT: ABNORMAL
BLD GP AB SCN SERPL QL: NEGATIVE
BOTTLE TYPE: ABNORMAL
BUN SERPL-MCNC: 30.2 MG/DL (ref 8–23)
BUN/CREAT SERPL: 21.7 (ref 7–25)
CALCIUM SPEC-SCNC: 8.1 MG/DL (ref 8.6–10.5)
CHLORIDE SERPL-SCNC: 105 MMOL/L (ref 98–107)
CK SERPL-CCNC: 238 U/L (ref 20–180)
CK SERPL-CCNC: NORMAL U/L
CO2 SERPL-SCNC: 16.8 MMOL/L (ref 22–29)
CREAT SERPL-MCNC: 1.39 MG/DL (ref 0.57–1)
DEPRECATED RDW RBC AUTO: 55.8 FL (ref 37–54)
EGFRCR SERPLBLD CKD-EPI 2021: 37.5 ML/MIN/1.73
ERYTHROCYTE [DISTWIDTH] IN BLOOD BY AUTOMATED COUNT: 16.2 % (ref 12.3–15.4)
FOLATE SERPL-MCNC: 11.3 NG/ML (ref 4.78–24.2)
GLUCOSE BLDC GLUCOMTR-MCNC: 129 MG/DL (ref 70–105)
GLUCOSE BLDC GLUCOMTR-MCNC: 136 MG/DL (ref 70–105)
GLUCOSE BLDC GLUCOMTR-MCNC: 160 MG/DL (ref 70–105)
GLUCOSE BLDC GLUCOMTR-MCNC: 169 MG/DL (ref 70–105)
GLUCOSE SERPL-MCNC: 134 MG/DL (ref 65–99)
HCT VFR BLD AUTO: 34 % (ref 34–46.6)
HGB BLD-MCNC: 9.7 G/DL (ref 12–15.9)
LAB AP CASE REPORT: NORMAL
LYMPHOCYTES # BLD MANUAL: 17.22 10*3/MM3 (ref 0.7–3.1)
MAGNESIUM SERPL-MCNC: 1.3 MG/DL (ref 1.6–2.4)
MAGNESIUM SERPL-MCNC: NORMAL MG/DL
MCH RBC QN AUTO: 27 PG (ref 26.6–33)
MCHC RBC AUTO-ENTMCNC: 28.5 G/DL (ref 31.5–35.7)
MCV RBC AUTO: 94.7 FL (ref 79–97)
NEUTROPHILS # BLD AUTO: 5.74 10*3/MM3 (ref 1.7–7)
NEUTROPHILS NFR BLD MANUAL: 25 % (ref 42.7–76)
PATH REPORT.FINAL DX SPEC: NORMAL
PHOSPHATE SERPL-MCNC: 2.3 MG/DL (ref 2.5–4.5)
PHOSPHATE SERPL-MCNC: 2.6 MG/DL (ref 2.5–4.5)
PHOSPHATE SERPL-MCNC: NORMAL MG/DL
PLATELET # BLD AUTO: 106 10*3/MM3 (ref 140–450)
PMV BLD AUTO: 12.5 FL (ref 6–12)
POIKILOCYTOSIS BLD QL SMEAR: ABNORMAL
POTASSIUM SERPL-SCNC: 3.8 MMOL/L (ref 3.5–5.2)
QT INTERVAL: 374 MS
QTC INTERVAL: 421 MS
RBC # BLD AUTO: 3.59 10*6/MM3 (ref 3.77–5.28)
RH BLD: POSITIVE
SCAN SLIDE: NORMAL
SMALL PLATELETS BLD QL SMEAR: ABNORMAL
SMUDGE CELLS BLD QL SMEAR: ABNORMAL
SODIUM SERPL-SCNC: 135 MMOL/L (ref 136–145)
T&S EXPIRATION DATE: NORMAL
VARIANT LYMPHS NFR BLD MANUAL: 75 % (ref 19.6–45.3)
WBC NRBC COR # BLD AUTO: 22.96 10*3/MM3 (ref 3.4–10.8)

## 2025-06-25 PROCEDURE — 74176 CT ABD & PELVIS W/O CONTRAST: CPT

## 2025-06-25 PROCEDURE — 25010000002 DIAZEPAM PER 5 MG: Performed by: STUDENT IN AN ORGANIZED HEALTH CARE EDUCATION/TRAINING PROGRAM

## 2025-06-25 PROCEDURE — 86850 RBC ANTIBODY SCREEN: CPT | Performed by: STUDENT IN AN ORGANIZED HEALTH CARE EDUCATION/TRAINING PROGRAM

## 2025-06-25 PROCEDURE — 85007 BL SMEAR W/DIFF WBC COUNT: CPT

## 2025-06-25 PROCEDURE — 83735 ASSAY OF MAGNESIUM: CPT | Performed by: STUDENT IN AN ORGANIZED HEALTH CARE EDUCATION/TRAINING PROGRAM

## 2025-06-25 PROCEDURE — 84100 ASSAY OF PHOSPHORUS: CPT | Performed by: STUDENT IN AN ORGANIZED HEALTH CARE EDUCATION/TRAINING PROGRAM

## 2025-06-25 PROCEDURE — 25010000002 CEFTRIAXONE PER 250 MG: Performed by: STUDENT IN AN ORGANIZED HEALTH CARE EDUCATION/TRAINING PROGRAM

## 2025-06-25 PROCEDURE — C1751 CATH, INF, PER/CENT/MIDLINE: HCPCS

## 2025-06-25 PROCEDURE — 82746 ASSAY OF FOLIC ACID SERUM: CPT

## 2025-06-25 PROCEDURE — 63710000001 INSULIN LISPRO (HUMAN) PER 5 UNITS

## 2025-06-25 PROCEDURE — 82948 REAGENT STRIP/BLOOD GLUCOSE: CPT

## 2025-06-25 PROCEDURE — 73560 X-RAY EXAM OF KNEE 1 OR 2: CPT

## 2025-06-25 PROCEDURE — 86901 BLOOD TYPING SEROLOGIC RH(D): CPT | Performed by: STUDENT IN AN ORGANIZED HEALTH CARE EDUCATION/TRAINING PROGRAM

## 2025-06-25 PROCEDURE — 84100 ASSAY OF PHOSPHORUS: CPT | Performed by: FAMILY MEDICINE

## 2025-06-25 PROCEDURE — 36410 VNPNXR 3YR/> PHY/QHP DX/THER: CPT

## 2025-06-25 PROCEDURE — 82550 ASSAY OF CK (CPK): CPT

## 2025-06-25 PROCEDURE — 80048 BASIC METABOLIC PNL TOTAL CA: CPT | Performed by: STUDENT IN AN ORGANIZED HEALTH CARE EDUCATION/TRAINING PROGRAM

## 2025-06-25 PROCEDURE — 85025 COMPLETE CBC W/AUTO DIFF WBC: CPT

## 2025-06-25 PROCEDURE — 25010000002 MAGNESIUM SULFATE 2 GM/50ML SOLUTION: Performed by: STUDENT IN AN ORGANIZED HEALTH CARE EDUCATION/TRAINING PROGRAM

## 2025-06-25 PROCEDURE — 76775 US EXAM ABDO BACK WALL LIM: CPT

## 2025-06-25 PROCEDURE — 86900 BLOOD TYPING SEROLOGIC ABO: CPT | Performed by: STUDENT IN AN ORGANIZED HEALTH CARE EDUCATION/TRAINING PROGRAM

## 2025-06-25 RX ORDER — DIAZEPAM 10 MG/2ML
2.5 INJECTION, SOLUTION INTRAMUSCULAR; INTRAVENOUS ONCE
Status: COMPLETED | OUTPATIENT
Start: 2025-06-25 | End: 2025-06-25

## 2025-06-25 RX ORDER — LOSARTAN POTASSIUM 50 MG/1
100 TABLET ORAL
Status: DISCONTINUED | OUTPATIENT
Start: 2025-06-25 | End: 2025-06-26 | Stop reason: HOSPADM

## 2025-06-25 RX ORDER — SODIUM CHLORIDE 0.9 % (FLUSH) 0.9 %
10 SYRINGE (ML) INJECTION AS NEEDED
Status: DISCONTINUED | OUTPATIENT
Start: 2025-06-25 | End: 2025-06-26 | Stop reason: HOSPADM

## 2025-06-25 RX ORDER — SODIUM CHLORIDE 0.9 % (FLUSH) 0.9 %
20 SYRINGE (ML) INJECTION AS NEEDED
Status: DISCONTINUED | OUTPATIENT
Start: 2025-06-25 | End: 2025-06-26 | Stop reason: HOSPADM

## 2025-06-25 RX ORDER — SODIUM CHLORIDE 0.9 % (FLUSH) 0.9 %
10 SYRINGE (ML) INJECTION EVERY 12 HOURS SCHEDULED
Status: DISCONTINUED | OUTPATIENT
Start: 2025-06-25 | End: 2025-06-26 | Stop reason: HOSPADM

## 2025-06-25 RX ORDER — MAGNESIUM SULFATE HEPTAHYDRATE 40 MG/ML
2 INJECTION, SOLUTION INTRAVENOUS
Status: COMPLETED | OUTPATIENT
Start: 2025-06-25 | End: 2025-06-25

## 2025-06-25 RX ADMIN — LEVOTHYROXINE SODIUM 100 MCG: 0.1 TABLET ORAL at 04:50

## 2025-06-25 RX ADMIN — MAGNESIUM SULFATE HEPTAHYDRATE 2 G: 40 INJECTION, SOLUTION INTRAVENOUS at 13:52

## 2025-06-25 RX ADMIN — MIRTAZAPINE 15 MG: 15 TABLET, FILM COATED ORAL at 20:33

## 2025-06-25 RX ADMIN — MAGNESIUM SULFATE HEPTAHYDRATE 2 G: 40 INJECTION, SOLUTION INTRAVENOUS at 15:54

## 2025-06-25 RX ADMIN — DOCUSATE SODIUM 100 MG: 100 CAPSULE, LIQUID FILLED ORAL at 20:33

## 2025-06-25 RX ADMIN — MAGNESIUM SULFATE HEPTAHYDRATE 2 G: 40 INJECTION, SOLUTION INTRAVENOUS at 12:13

## 2025-06-25 RX ADMIN — APIXABAN 2.5 MG: 2.5 TABLET, FILM COATED ORAL at 20:34

## 2025-06-25 RX ADMIN — DOCUSATE SODIUM 100 MG: 100 CAPSULE, LIQUID FILLED ORAL at 08:54

## 2025-06-25 RX ADMIN — SERTRALINE HYDROCHLORIDE 150 MG: 100 TABLET, FILM COATED ORAL at 08:53

## 2025-06-25 RX ADMIN — Medication 10 ML: at 20:34

## 2025-06-25 RX ADMIN — DIAZEPAM 2.5 MG: 10 INJECTION, SOLUTION INTRAMUSCULAR; INTRAVENOUS at 02:49

## 2025-06-25 RX ADMIN — Medication 10 ML: at 11:50

## 2025-06-25 RX ADMIN — CEFTRIAXONE 2000 MG: 2 INJECTION, POWDER, FOR SOLUTION INTRAMUSCULAR; INTRAVENOUS at 11:26

## 2025-06-25 RX ADMIN — APIXABAN 2.5 MG: 2.5 TABLET, FILM COATED ORAL at 08:54

## 2025-06-25 RX ADMIN — INSULIN LISPRO 2 UNITS: 100 INJECTION, SOLUTION INTRAVENOUS; SUBCUTANEOUS at 18:01

## 2025-06-25 RX ADMIN — ROPINIROLE HYDROCHLORIDE 0.25 MG: 0.25 TABLET, FILM COATED ORAL at 20:33

## 2025-06-25 RX ADMIN — FERROUS SULFATE TAB 325 MG (65 MG ELEMENTAL FE) 325 MG: 325 (65 FE) TAB at 08:54

## 2025-06-25 RX ADMIN — AMLODIPINE BESYLATE 10 MG: 5 TABLET ORAL at 08:54

## 2025-06-25 RX ADMIN — PANTOPRAZOLE SODIUM 40 MG: 40 TABLET, DELAYED RELEASE ORAL at 08:54

## 2025-06-25 RX ADMIN — INSULIN LISPRO 2 UNITS: 100 INJECTION, SOLUTION INTRAVENOUS; SUBCUTANEOUS at 09:01

## 2025-06-25 RX ADMIN — ROSUVASTATIN CALCIUM 10 MG: 10 TABLET, FILM COATED ORAL at 20:34

## 2025-06-25 RX ADMIN — LOSARTAN POTASSIUM 100 MG: 50 TABLET, FILM COATED ORAL at 13:59

## 2025-06-25 NOTE — CONSULTS
Picc team consult:    Procedure explained to patient and family member and agree to proceed.  Time out performed.  Power glide midline catheter placed LUE basilic vessel utilizing US guidance and sterile technique with easily compressible, non pulsatile vessel without difficulty.  Dark venous blood return noted and line flushes without difficulty.

## 2025-06-25 NOTE — SIGNIFICANT NOTE
06/25/25 1544   OTHER   Discipline physical therapist   Rehab Time/Intention   Session Not Performed patient unavailable for treatment  (Pt off floor for CT upon attempt. Will follow up as able.)   Therapy Assessment/Plan (PT)   Criteria for Skilled Interventions Met (PT) yes;skilled treatment is necessary   Recommendation   PT - Next Appointment 06/26/25

## 2025-06-25 NOTE — PLAN OF CARE
Goal Outcome Evaluation:            Patient admitted to 2a, patient has been anxious throughout shift, one time dose ordered of Valium 2.5mg was given. Patient is an extremely difficult stick, blood cultures came back positive for E. Coli, family made aware. Patient lost IV access at 1am, this nurse and other staff attempted with no success, daughter at bedside stated that patient usually has to have central line placed due to poor venous access. MD made aware.

## 2025-06-25 NOTE — DISCHARGE PLACEMENT REQUEST
"Diane Apple (85 y.o. Female)       Date of Birth   1940    Social Security Number       Address   [address] [place] [address] [address] [place] [facility]    Home Phone   [phone]    MRN   [id number]       Church   Non-Baptism    Marital Status                               Admission Date   6/24/2025    Admission Type   Emergency    Admitting Provider   Jose Angel Yeung DO    Attending Provider   Bo Verma MD    Department, Room/Bed   [facility] INPATIENT, 201/1       Discharge Date       Discharge Disposition       Discharge Destination                                 Attending Provider: Bo Verma MD    Allergies: No Known Allergies    Isolation: None   Infection: None   Code Status: CPR    Ht: 162.6 cm (64\")   Wt: 75.8 kg (167 lb 3.2 oz)    Admission Cmt: None   Principal Problem: UTI (urinary tract infection) [N39.0]                   Active Insurance as of 6/24/2025       Primary Coverage       Payor Plan Insurance Group Employer/Plan Group    HUMANA MEDICARE REPLACEMENT HU[employer]NA MEDICARE ADVANTAGE PPO [id number]       Payor Plan Address Payor Plan Phone Number Payor Plan Fax Number Effective Dates    [address] [address] [phone]  1/1/2023 - None Entered    [facility] [id number]         Subscriber Name Subscriber Birth Date Member ID       DIANE APPLE 1940 [id number]                     Emergency Contacts        (Rel.) Home Phone Work Phone Mobile Phone    JESUS SLAUGHTER (Daughter) [phone] -- --    Jose Apple (Son) -- -- [phone]                "

## 2025-06-25 NOTE — CONSULTS
Infectious Diseases Consult Note    Referring Provider: Bo Verma MD    Reason for Consultation: Bacteremia    Patient Care Team:  Asia Queen APRN as PCP - General (Nurse Practitioner)  Asia Queen APRN as Nurse Practitioner (Nurse Practitioner)  Gregory Rivers MD as Consulting Physician (Hematology and Oncology)    Chief complaint weakness, falls    Subjective     History of present illness:      This is a 85-year-old female who presents to the hospital on 6/24/2025 due to multiple falls and weakness that been worsening.  Patient fell before admission and was on the floor between 11:30 PM and 6 AM until family could come help her.  States that she has noticed increasing weakness of the last several months and is even been getting iron infusions to help with the weakness.  Denies fever and chills, significant shortness of breath or cough, GI symptoms or urinary symptoms.  Does have some back pain but thought that was due to or to the multiple falls    Review of Systems   Review of Systems   Constitutional:  Positive for fatigue.   HENT: Negative.     Eyes: Negative.    Respiratory: Negative.     Cardiovascular: Negative.    Gastrointestinal: Negative.    Endocrine: Negative.    Genitourinary: Negative.    Musculoskeletal:  Positive for back pain.   Skin: Negative.    Neurological:  Positive for weakness.        Falls   Psychiatric/Behavioral: Negative.     All other systems reviewed and are negative.      Medications  Medications Prior to Admission   Medication Sig Dispense Refill Last Dose/Taking    amLODIPine (NORVASC) 10 MG tablet Take 1 tablet by mouth Daily. for high blood pressure 90 tablet 1 6/24/2025    apixaban (ELIQUIS) 2.5 MG tablet tablet Take 1 tablet by mouth 2 (Two) Times a Day. 60 tablet 5 6/24/2025    chlorthalidone (HYGROTON) 25 MG tablet Take 1 tablet by mouth Daily.   6/24/2025    docusate sodium (COLACE) 100 MG capsule Take 1 capsule by mouth 2 (Two) Times a Day.  Indications: Constipation  3 6/24/2025    ferrous sulfate 324 (65 Fe) MG tablet delayed-release EC tablet Take 1 tablet by mouth 3 (Three) Times a Week. On Monday, Wednesday, and Friday  Indications: Iron Deficiency   Taking    irbesartan (AVAPRO) 300 MG tablet Take 1 tablet by mouth Every Night.   6/23/2025    levothyroxine (SYNTHROID, LEVOTHROID) 100 MCG tablet TAKE 1 TABLET BY MOUTH EVERY DAY 90 tablet 3 6/24/2025    metFORMIN (GLUCOPHAGE) 500 MG tablet TAKE 1 TABLET BY MOUTH EVERY DAY WITH BREAKFAST 90 tablet 3 6/24/2025    mirtazapine (REMERON) 15 MG tablet TAKE 1 TABLET BY MOUTH EVERYDAY AT BEDTIME 90 tablet 1 6/23/2025    pantoprazole (PROTONIX) 40 MG EC tablet Take 1 tablet by mouth Daily.   6/24/2025    rOPINIRole (REQUIP) 0.25 MG tablet Take 1 tablet by mouth Every Night. Take 1 hour before bedtime.   6/23/2025    rosuvastatin (CRESTOR) 10 MG tablet TAKE 1 TABLET BY MOUTH EVERY DAY AT NIGHT 90 tablet 3 6/23/2025    sertraline (ZOLOFT) 100 MG tablet TAKE 1 AND 1/2 TABLETS BY MOUTH EVERY  tablet 3 6/23/2025    metoclopramide (REGLAN) 5 MG tablet TAKE 1 TABLET BY MOUTH 3 (THREE) TIMES A DAY AS NEEDED (FOR NAUSEA AND VOMITING). 270 tablet 2        History  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/2019    Depression     Diabetes mellitus     Disease of thyroid gland     Diverticulosis of colon 2018    Identified on colonoscopy    DVT (deep venous thrombosis)     Dysphagia 12/2020    Dyspnea on exertion     Fall at home 10/11/2023    Hemorrhoid     Hiatal hernia 12/2020    Hiatal hernia with GERD     large hiatal hernia with high-grade reflux on barium swallow; s/p laparoscopic fundoplication with gastropexy    History of breast cancer 10/11/2023    History of cigarette smoking 10/11/2023     History of diabetes mellitus     no meds now    HL (hearing loss)     Hyperlipidemia     Hypertension     Knee swelling     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    aspiration pneumonia; completed abx therapy    OAB (overactive bladder)     Personal history of CLL (chronic lymphocytic leukemia) 10/11/2023    Pulmonary emboli     Skin cancer     Sleep apnea     daughter states pt does not have and doesn't have machine     Past Surgical History:   Procedure Laterality Date    BLADDER SURGERY      BREAST BIOPSY      BRONCHOSCOPY N/A 09/13/2019    Procedure: BRONCHOSCOPY with bronchial washing;  Surgeon: Marnie Frankel MD;  Location: [facility] ENDOSCOPY;  Service: Pulmonary    BRONCHOSCOPY Bilateral 10/18/2023    Procedure: BRONCHOSCOPY AT BEDSIDE;  Surgeon: Vinicius Heredia DO;  Location: [facility] ENDOSCOPY;  Service: Pulmonary;  Laterality: Bilateral;    COLONOSCOPY  07/19/2018    severe diverticulosis    CYST REMOVAL      Removed a fatty cyst off of her back    ENDOSCOPY N/A 11/23/2020    Procedure: ESOPHAGOGASTRODUODENOSCOPY with dilatation (Bougie # 48, 50, 52, 54, 56, 58);  Surgeon: Den Plummer DO;  Location: [facility] ENDOSCOPY;  Service: General;  Laterality: N/A;  Post: large hiatal hernia, joshua's ulcers    EYE SURGERY      HERNIA REPAIR      HIATAL HERNIA REPAIR N/A 12/30/2020    Procedure: Laparoscopic hiatal hernia repair with gastropexy;  Surgeon: Den Plummer DO;  Location: [facility] MAIN OR;  Service: General;  Laterality: N/A;    MASTECTOMY Right 02/04/2019    Invasive ductal carcinoma    TUBAL ABDOMINAL LIGATION         Family History  Family History   Problem Relation Age of Onset    Diabetes Mother     Arthritis Other     Heart disease Other     Arthritis Father     Hyperlipidemia Father     Miscarriages / Stillbirths Daughter        Social History   reports that she quit smoking about 33 years ago. Her smoking use included cigarettes. She started smoking about 35 years ago. She has a 1 pack-year  smoking history. She has never used smokeless tobacco. She reports that she does not drink alcohol and does not use drugs.    Allergies  Patient has no known allergies.    Objective     Vital Signs   Vital Signs (last 24 hours)         06/24 0700  06/25 0659 06/25 0700  06/25 1141   Most Recent      Temp (°F) 98 -  99.8      99.1     99.1 (37.3) 06/25 0756    Heart Rate 72 -  88      81     81 06/25 0756    Resp 16 -  19      16     16 06/25 0756    /61 -  137/59      145/56     145/56 06/25 0756    SpO2 (%) 88 -  97      95     95 06/25 0756    Flow (L/min) (Oxygen Therapy) 2 -  7      5     5 06/25 0756            Physical Exam:  Physical Exam  Vitals and nursing note reviewed.   Constitutional:       General: She is not in acute distress.     Appearance: She is well-developed and normal weight. She is ill-appearing. She is not diaphoretic.   HENT:      Head: Normocephalic and atraumatic.   Eyes:      General: No scleral icterus.     Extraocular Movements: Extraocular movements intact.      Conjunctiva/sclera: Conjunctivae normal.      Pupils: Pupils are equal, round, and reactive to light.   Cardiovascular:      Rate and Rhythm: Normal rate and regular rhythm.      Heart sounds: Normal heart sounds, S1 normal and S2 normal. No murmur heard.  Pulmonary:      Effort: Pulmonary effort is normal. No respiratory distress.      Breath sounds: Normal breath sounds. No stridor. No wheezing or rales.   Chest:      Chest wall: No tenderness.   Abdominal:      General: Bowel sounds are normal. There is no distension.      Palpations: Abdomen is soft. There is no mass.      Tenderness: There is no abdominal tenderness. There is no guarding.   Musculoskeletal:         General: No swelling, tenderness or deformity. Normal range of motion.      Cervical back: Neck supple.   Skin:     General: Skin is warm and dry.      Coloration: Skin is not pale.      Findings: No bruising, erythema or rash.   Neurological:       General: No focal deficit present.      Mental Status: She is alert and oriented to person, place, and time. Mental status is at baseline.      Cranial Nerves: No cranial nerve deficit.   Psychiatric:         Mood and Affect: Mood normal.         Microbiology  Microbiology Results (last 10 days)       Procedure Component Value - Date/Time    Blood Culture - Blood, Arm, Left [[id number]]  (Abnormal) Collected: 06/24/25 1133    Lab Status: Preliminary result Specimen: Blood from Arm, Left Updated: 06/25/25 0308     Blood Culture Abnormal Stain     Gram Stain Anaerobic Bottle Gram negative bacilli    Blood Culture ID, PCR - Blood, Arm, Left [[id number]]  (Abnormal) Collected: 06/24/25 1133    Lab Status: Final result Specimen: Blood from Arm, Left Updated: 06/25/25 0309     BCID, PCR Escherichia coli. Identification by BCID2 PCR.     BOTTLE TYPE Anaerobic Bottle    Narrative:      No resistance genes detected.    Respiratory Panel PCR w/COVID-19(SARS-CoV-2) LESLEE/VIVIANE/PAVAN/PAD/COR/JUAN DAVID In-House, NP Swab in UTM/VTM, 2 HR TAT - Swab, Nasopharynx [[id number]]  (Normal) Collected: 06/24/25 1012    Lab Status: Final result Specimen: Swab from Nasopharynx Updated: 06/24/25 1119     ADENOVIRUS, PCR Not Detected     Coronavirus 229E Not Detected     Coronavirus HKU1 Not Detected     Coronavirus NL63 Not Detected     Coronavirus OC43 Not Detected     COVID19 Not Detected     Human Metapneumovirus Not Detected     Human Rhinovirus/Enterovirus Not Detected     Influenza A PCR Not Detected     Influenza B PCR Not Detected     Parainfluenza Virus 1 Not Detected     Parainfluenza Virus 2 Not Detected     Parainfluenza Virus 3 Not Detected     Parainfluenza Virus 4 Not Detected     RSV, PCR Not Detected     Bordetella pertussis pcr Not Detected     Bordetella parapertussis PCR Not Detected     Chlamydophila pneumoniae PCR Not Detected     Mycoplasma pneumo by PCR Not Detected    Narrative:      In the setting of a positive respiratory panel  with a viral infection PLUS a negative procalcitonin without other underlying concern for bacterial infection, consider observing off antibiotics or discontinuation of antibiotics and continue supportive care. If the respiratory panel is positive for atypical bacterial infection (Bordetella pertussis, Chlamydophila pneumoniae, or Mycoplasma pneumoniae), consider antibiotic de-escalation to target atypical bacterial infection.    Blood Culture - Blood, Arm, Left [[id number]]  (Normal) Collected: 06/24/25 1012    Lab Status: Preliminary result Specimen: Blood from Arm, Left Updated: 06/25/25 1030     Blood Culture No growth at 24 hours    Narrative:      Less than seven (7) mL's of blood was collected.  Insufficient quantity may yield false negative results.            Laboratory  Results from last 7 days   Lab Units 06/25/25  0436   WBC 10*3/mm3 22.96*   HEMOGLOBIN g/dL 9.7*   HEMATOCRIT % 34.0   PLATELETS 10*3/mm3 106*     Results from last 7 days   Lab Units 06/25/25  0436   SODIUM mmol/L 135*   POTASSIUM mmol/L 3.8   CHLORIDE mmol/L 105   CO2 mmol/L 16.8*   BUN mg/dL 30.2*   CREATININE mg/dL 1.39*   GLUCOSE mg/dL 134*   CALCIUM mg/dL 8.1*     Results from last 7 days   Lab Units 06/25/25  0436   SODIUM mmol/L 135*   POTASSIUM mmol/L 3.8   CHLORIDE mmol/L 105   CO2 mmol/L 16.8*   BUN mg/dL 30.2*   CREATININE mg/dL 1.39*   GLUCOSE mg/dL 134*   CALCIUM mg/dL 8.1*     Results from last 7 days   Lab Units 06/24/25  1628   CK TOTAL U/L 314*               Radiology  Imaging Results (Last 72 Hours)       Procedure Component Value Units Date/Time    US Renal Bilateral [[id number]] Collected: 06/25/25 1026     Updated: 06/25/25 1033    Narrative:      US RENAL BILATERAL    Date of Exam: 6/25/2025 9:54 AM EDT    Indication: assess for pyelonephritis.    Comparison: 10/16/2023    Technique: Grayscale and color Doppler ultrasound evaluation of the kidneys and urinary bladder was performed.      Findings:  There are small  bilateral anechoic cysts noted in the kidneys, measuring up to 1.1 cm on the right and 1.8 cm on the left. Kidneys otherwise have a normal sonographic appearance. There is no hydronephrosis or shadowing calculus. Normal cortical thickness   and echogenicity. No perinephric fluid collection identified. Right kidney measures 10.6 x 6.7 x 5.6 cm, left 10.1 x 6.9 x 5.5 cm. Urinary bladder appears thick walled, which may be accentuated by nondistention. Cystitis could also cause this   appearance. Correlate clinically.      Impression:      Impression:  1.No hydronephrosis or shadowing calculus.  2.Small bilateral renal cysts. Otherwise normal sonographic appearance of the kidneys.  3.Urinary bladder appears thick walled, which may be accentuated by nondistention. Correlate clinically for cystitis.        Electronically Signed: David Chester MD    6/25/2025 10:31 AM EDT    Workstation ID: [id number]    XR Chest 1 View [[id number]] Collected: 06/24/25 1034     Updated: 06/24/25 1039    Narrative:      XR CHEST 1 VW, XR ELBOW 3+ VW RIGHT    Date of Exam: 6/24/2025 10:18 AM EDT    Indication: falls with rib pain    Comparison: 3/11/2025    Findings:  Chest:  Heart is mildly enlarged. There is mild elevation of the right hemidiaphragm similar to the prior studies. Negative for pneumothorax or effusion. No rib fracture within limits of single frontal radiograph. Aortic arch atherosclerosis. Osseous structures   appear intact.    Right elbow:  Negative for fracture or dislocation. Mild degenerative spurring at the medial epicondyle. No evidence of elbow joint effusion although lateral view is partially limited due to positioning and obliquity.      Impression:      Impression:  Chest:  No acute process.    Right elbow:  Negative for fracture.          Electronically Signed: Pavan Amezquita MD    6/24/2025 10:37 AM EDT    Workstation ID: [id number]    XR Elbow 3+ View Right [[id number]] Collected: 06/24/25 1034     Updated: 06/24/25 1039     Narrative:      XR CHEST 1 VW, XR ELBOW 3+ VW RIGHT    Date of Exam: 6/24/2025 10:18 AM EDT    Indication: falls with rib pain    Comparison: 3/11/2025    Findings:  Chest:  Heart is mildly enlarged. There is mild elevation of the right hemidiaphragm similar to the prior studies. Negative for pneumothorax or effusion. No rib fracture within limits of single frontal radiograph. Aortic arch atherosclerosis. Osseous structures   appear intact.    Right elbow:  Negative for fracture or dislocation. Mild degenerative spurring at the medial epicondyle. No evidence of elbow joint effusion although lateral view is partially limited due to positioning and obliquity.      Impression:      Impression:  Chest:  No acute process.    Right elbow:  Negative for fracture.          Electronically Signed: Pavan Amezquita MD    6/24/2025 10:37 AM EDT    Workstation ID: [id number]            Cardiology      Results Review:  I have reviewed all clinical data, test, lab, and imaging results.       Schedule Meds  amLODIPine, 10 mg, Oral, Daily  apixaban, 2.5 mg, Oral, BID  cefTRIAXone, 2,000 mg, Intravenous, Q24H  docusate sodium, 100 mg, Oral, BID  ferrous sulfate, 325 mg, Oral, Once per day on Monday Wednesday Friday  insulin lispro, 2-7 Units, Subcutaneous, 4x Daily AC & at Bedtime  levothyroxine, 100 mcg, Oral, Q AM  magnesium sulfate, 2 g, Intravenous, Q2H  mirtazapine, 15 mg, Oral, Nightly  pantoprazole, 40 mg, Oral, Daily  rOPINIRole, 0.25 mg, Oral, Nightly  rosuvastatin, 10 mg, Oral, Nightly  sertraline, 150 mg, Oral, Daily  sodium chloride, 10 mL, Intravenous, Q12H        Infusion Meds  dextrose 5 % and lactated Ringer's, 75 mL/hr, Last Rate: 75 mL/hr (06/24/25 1531)        PRN Meds    acetaminophen **OR** acetaminophen **OR** acetaminophen    Calcium Replacement - Follow Nurse / BPA Driven Protocol    dextrose    dextrose    glucagon (human recombinant)    Magnesium Standard Dose Replacement - Follow Nurse / BPA Driven Protocol     metoclopramide    Phosphorus Replacement - Follow Nurse / BPA Driven Protocol    Potassium Replacement - Follow Nurse / BPA Driven Protocol    [COMPLETED] Insert Peripheral IV **AND** sodium chloride    sodium chloride    sodium chloride    traMADol      Assessment & Plan       Assessment    Transient E. coli bacteremia.  Most likely related to urinary tract infection.  Patient's main complaint has been weakness and falls over the last several months.    Urinary tract infection with urinalysis showing pyuria and bacteriuria.  Cultures pending.  Will need to rule out obstructive uropathy    Lymphocytosis-most likely related to the patient's CLL.  Currently only under surveillance    Rhabdomyolysis-patient was on the floor for over 7 hours after a fall at home.  Creatinine and CK were elevated    History of breast cancer status post mastectomies in the past    Type 2 diabetes    Plan    Continue IV Rocephin 2 g every 24 hours.  Patient will need 2 weeks of antimicrobial therapy  CT stone protocol to rule obstructive uropathy  Waiting on blood cultures to finalize   Waiting on urine culture to finalize  Continue supportive care  A.mZachery labs  Case discussed with patient and family member at bedside        SIOMARA Sanchez  06/25/25  11:41 EDT    Note is dictated utilizing voice recognition software/Dragon

## 2025-06-25 NOTE — CASE MANAGEMENT/SOCIAL WORK
Discharge Planning Assessment   Hoang     Patient Name: Diane Guan  MRN: [id number]  Today's Date: 6/25/2025    Admit Date: 6/24/2025    Plan: [facility] (pending acceptance.) CATHLEEN requested. Will need precert   Discharge Needs Assessment       Row Name 06/25/25 1239       Living Environment    People in Home spouse    Name(s) of People in Home  Doron    Current Living Arrangements home    Potentially Unsafe Housing Conditions none    In the past 12 months has the electric, gas, oil, or water company threatened to shut off services in your home? No    Primary Care Provided by self    Provides Primary Care For no one    Family Caregiver if Needed child(marco), adult    Family Caregiver Names daughter Monika    Quality of Family Relationships helpful;involved;supportive    Able to Return to Prior Arrangements yes       Resource/Environmental Concerns    Resource/Environmental Concerns none    Transportation Concerns none       Transportation Needs    In the past 12 months, has lack of transportation kept you from medical appointments or from getting medications? no    In the past 12 months, has lack of transportation kept you from meetings, work, or from getting things needed for daily living? No       Food Insecurity    Within the past 12 months, you worried that your food would run out before you got the money to buy more. Never true    Within the past 12 months, the food you bought just didn't last and you didn't have money to get more. Never true       Transition Planning    Patient/Family Anticipates Transition to inpatient rehabilitation facility    Patient/Family Anticipated Services at Transition skilled nursing    Transportation Anticipated family or friend will provide       Discharge Needs Assessment    Readmission Within the Last 30 Days no previous admission in last 30 days    Equipment Currently Used at Home oxygen;hospital bed;commode;walker, rolling    Concerns to be Addressed  discharge planning    Do you want help finding or keeping work or a job? Patient declined    Do you want help with school or training? For example, starting or completing job training or getting a high school diploma, GED or equivalent Patient declined    Anticipated Changes Related to Illness none    Equipment Needed After Discharge none    Outpatient/Agency/Support Group Needs skilled nursing facility    Discharge Facility/Level of Care Needs nursing facility, skilled                   Discharge Plan       Row Name 06/25/25 1244       Plan    Plan [facility] (pending acceptance.) PASRR requested. Will need precert    Patient/Family in Agreement with Plan yes    Plan Comments CM met with pt at bedside to discuss discharge needs- daughter Monika at bedside assisting to answer CM questions. Pt is A&O, lives with spouse Doron, does not drive and is normally independent with ADLS. PCP and pharmacy verified- [facility] declined due to pending facility placement. No issues stated affording food or medications, and home environment is safe. Current DME: BSC, hospital bed, walker, wheelchair, home O2 2L ([place].) SNF facility choices obtained. 1) [facility] referral placed and liaison contacted (pending acceptance) 2)[facility] 3)[place]. Family will transport at discharge.                  Continued Care and Services - Admitted Since 6/24/2025       Destination       Service Provider Request Status Services Address Phone Fax Patient Preferred    [facility] Pending - Request Sent -- [address] [place] [address] [phone] --                Demographic Summary       Row Name 06/25/25 1238       General Information    Admission Type inpatient    Arrived From emergency department    Required Notices Provided Important Message from Medicare    Referral Source admission list    Reason for Consult discharge planning    Preferred Language English       Contact  Information    Permission Granted to Share Info With                    Functional Status       Row Name 06/25/25 1239       Functional Status    Usual Activity Tolerance fair    Current Activity Tolerance poor       Functional Status, IADL    Medications independent    Meal Preparation independent    Housekeeping independent    Laundry independent    Shopping assistive equipment and person    If for any reason you need help with day-to-day activities such as bathing, preparing meals, shopping, managing finances, etc., do you get the help you need? I get all the help I need       Mental Status    General Appearance WDL WDL       Mental Status Summary    Recent Changes in Mental Status/Cognitive Functioning no changes       Employment/    Current or Previous Occupation not applicable                 Polly Estrada RN     Office phone: [phone]  Office fax: [phone]

## 2025-06-25 NOTE — PROGRESS NOTES
[facility] MEDICINE SERVICE  DAILY PROGRESS NOTE    NAME: Diane Guan  : 1940  MRN: [id number]      LOS: 1 day     PROVIDER OF SERVICE: Bo Verma MD    Chief Complaint: UTI (urinary tract infection)    Subjective:     Interval History:  History taken from: patient    Patient denied any fever or chills however was found on the floor by family member after sustaining fall a lot.  CK mildly elevated      Review of Systems:   Review of Systems    Objective:     Vital Signs  Temp:  [98 °F (36.7 °C)-99.8 °F (37.7 °C)] 99.1 °F (37.3 °C)  Heart Rate:  [72-88] 81  Resp:  [16-19] 16  BP: (112-145)/(44-61) 145/56  Flow (L/min) (Oxygen Therapy):  [2-7] 5   Body mass index is 28.7 kg/m².    Physical Exam  Physical Exam  HENT:      Head: Atraumatic.      Mouth/Throat:      Mouth: Mucous membranes are moist.   Eyes:      Pupils: Pupils are equal, round, and reactive to light.   Cardiovascular:      Rate and Rhythm: Normal rate and regular rhythm.   Pulmonary:      Effort: Pulmonary effort is normal.      Breath sounds: Normal breath sounds.   Abdominal:      General: Bowel sounds are normal.      Palpations: Abdomen is soft.   Musculoskeletal:         General: Tenderness present. Normal range of motion.      Cervical back: Neck supple.      Comments: Rt knee    Neurological:      General: No focal deficit present.      Mental Status: She is alert and oriented to person, place, and time.   Psychiatric:         Mood and Affect: Mood normal.            Diagnostic Data    Results from last 7 days   Lab Units 25  0436 25  1012   WBC 10*3/mm3 22.96* 24.40*   HEMOGLOBIN g/dL 9.7* 10.0*   HEMATOCRIT % 34.0 33.1*   PLATELETS 10*3/mm3 106* 115*   GLUCOSE mg/dL 134* 130*   CREATININE mg/dL 1.39* 1.51*   BUN mg/dL 30.2* 34.1*   SODIUM mmol/L 135* 133*   POTASSIUM mmol/L 3.8 4.1   AST (SGOT) U/L  --  35*   ALT (SGPT) U/L  --  19   ALK PHOS U/L  --  84   BILIRUBIN mg/dL  --  0.6   ANION GAP mmol/L 13.2 14.8        XR Chest 1 View  Result Date: 6/24/2025  Impression: Chest: No acute process. Right elbow: Negative for fracture. Electronically Signed: Pavan Amezquita MD  6/24/2025 10:37 AM EDT  Workstation ID: [id number]    XR Elbow 3+ View Right  Result Date: 6/24/2025  Impression: Chest: No acute process. Right elbow: Negative for fracture. Electronically Signed: Pavan Amezquita MD  6/24/2025 10:37 AM EDT  Workstation ID: [id number]        I reviewed the patient's new clinical results.    Assessment/Plan:     Active and Resolved Problems  Active Hospital Problems    Diagnosis  POA    **UTI (urinary tract infection) [N39.0]  Yes      Resolved Hospital Problems   No resolved problems to display.   Diane Guan is a 85 y.o. female with a CMH of CLL, CKD stage 3B, T2DM, HLD, HTN, Hypothyroidism, and hx of DVT on eliquis who presented to [facility] on 6/24/2025 with generalized weakness and increased falls x 1 week     Assessment and plan  E. coli bacteremia  UTI with cystitis  - Presented with dark urine with increased frequency  - Urinalysis consistent with UTI  -Urine culture pending  -Blood culture grew E. coli pending sensitivity  -Will consult ID and pulmonary status  - Patient started on ceftriaxone we will continue to monitor side effects  - Similar presentation on 03/2025 found to have UTI with culture Klebsiella sensitive to ceftriaxone  - Will do renal ultrasound to assess for hydronephrosis/obstructive uropathy    CLL: f/u with hematology outpatient  - WBC 24.40, hemoglobin 10, platelets 115, lab abnormalities are consistent with previous labs  -follows with Dr. Bolden  -has not required any treatment since her diagnosis  -receives Venofer infusions outpatient    Rhabdomyolysis-mild  Continue gentle IV fluid hydration    Recurrent falls  -Right knee pain will x-ray  -Continue pain control  -PT/OT     CKD stage 3B: Creatinine 1.51 on arrival, estimated GFR 33.9.  Near baseline. Mild bump in creatinine  - s/p 1 L  IVF  -Continue D5LR for starvation ketosis  -Avoid nephrotoxic medication and contrast  -Avoid hypotension  -Hold home Chlorthalidone, Irbesartan     T2DM: A1c pending  - Holding home antihyperglycemics  - Low-dose sliding scale insulin  - Hypoglycemia protocol ordered     HLD: Continue home statin     HTN: Patient has remained normotensive in the ER  - Continue home amlodipine     Hypothyroidism: TSH mildly elevated at 4.260, free T4 within normal limits  - Continue home Synthroid     hx of DVT and PE on eliquis: Continue home Eliquis  - diagnosed July 2024, dose reduced to 2.5mg BID        VTE Prophylaxis:  Pharmacologic VTE prophylaxis orders are present.             Disposition Planning:     Barriers to Discharge: E. coli bacteremia awaiting final urine and blood culture results  Anticipated Date of Discharge: 6/28  Place of Discharge: TBD      Time: 35 minutes     Code Status and Medical Interventions: CPR (Attempt to Resuscitate); Full Support   Ordered at: 06/24/25 1252     Code Status (Patient has no pulse and is not breathing):    CPR (Attempt to Resuscitate)     Medical Interventions (Patient has pulse or is breathing):    Full Support     Level Of Support Discussed With:    Patient       Signature: Electronically signed by Bo Verma MD, 06/25/25, 08:01 EDT.  [facility] Hospitalist Team

## 2025-06-25 NOTE — PLAN OF CARE
Problem: Adult Inpatient Plan of Care  Goal: Plan of Care Review  Outcome: Progressing   Goal Outcome Evaluation:      Pt had extensive imaging completed. Progressing towards goals.

## 2025-06-26 VITALS
RESPIRATION RATE: 22 BRPM | BODY MASS INDEX: 28.54 KG/M2 | TEMPERATURE: 98.2 F | WEIGHT: 167.2 LBS | OXYGEN SATURATION: 92 % | HEART RATE: 65 BPM | SYSTOLIC BLOOD PRESSURE: 112 MMHG | HEIGHT: 64 IN | DIASTOLIC BLOOD PRESSURE: 50 MMHG

## 2025-06-26 LAB
ANION GAP SERPL CALCULATED.3IONS-SCNC: 10.4 MMOL/L (ref 5–15)
BACTERIA SPEC AEROBE CULT: ABNORMAL
BUN SERPL-MCNC: 31.5 MG/DL (ref 8–23)
BUN/CREAT SERPL: 22.2 (ref 7–25)
CALCIUM SPEC-SCNC: 8 MG/DL (ref 8.6–10.5)
CHLORIDE SERPL-SCNC: 105 MMOL/L (ref 98–107)
CO2 SERPL-SCNC: 19.6 MMOL/L (ref 22–29)
CREAT SERPL-MCNC: 1.42 MG/DL (ref 0.57–1)
DEPRECATED RDW RBC AUTO: 53.8 FL (ref 37–54)
EGFRCR SERPLBLD CKD-EPI 2021: 36.5 ML/MIN/1.73
ERYTHROCYTE [DISTWIDTH] IN BLOOD BY AUTOMATED COUNT: 16.1 % (ref 12.3–15.4)
GLUCOSE BLDC GLUCOMTR-MCNC: 136 MG/DL (ref 70–105)
GLUCOSE BLDC GLUCOMTR-MCNC: 137 MG/DL (ref 70–105)
GLUCOSE BLDC GLUCOMTR-MCNC: 173 MG/DL (ref 70–105)
GLUCOSE SERPL-MCNC: 129 MG/DL (ref 65–99)
HCT VFR BLD AUTO: 40.9 % (ref 34–46.6)
HGB BLD-MCNC: 12.4 G/DL (ref 12–15.9)
LYMPHOCYTES # BLD MANUAL: 9.39 10*3/MM3 (ref 0.7–3.1)
LYMPHOCYTES NFR BLD MANUAL: 1 % (ref 5–12)
MAGNESIUM SERPL-MCNC: 3.2 MG/DL (ref 1.6–2.4)
MCH RBC QN AUTO: 27.3 PG (ref 26.6–33)
MCHC RBC AUTO-ENTMCNC: 30.3 G/DL (ref 31.5–35.7)
MCV RBC AUTO: 90.1 FL (ref 79–97)
MONOCYTES # BLD: 0.13 10*3/MM3 (ref 0.1–0.9)
NEUTROPHILS # BLD AUTO: 3.7 10*3/MM3 (ref 1.7–7)
NEUTROPHILS NFR BLD MANUAL: 28 % (ref 42.7–76)
PHOSPHATE SERPL-MCNC: 3.3 MG/DL (ref 2.5–4.5)
PLATELET # BLD AUTO: 86 10*3/MM3 (ref 140–450)
PMV BLD AUTO: 12.1 FL (ref 6–12)
POIKILOCYTOSIS BLD QL SMEAR: ABNORMAL
POTASSIUM SERPL-SCNC: 3.7 MMOL/L (ref 3.5–5.2)
RBC # BLD AUTO: 4.54 10*6/MM3 (ref 3.77–5.28)
SMALL PLATELETS BLD QL SMEAR: ABNORMAL
SMUDGE CELLS BLD QL SMEAR: ABNORMAL
SODIUM SERPL-SCNC: 135 MMOL/L (ref 136–145)
VARIANT LYMPHS NFR BLD MANUAL: 2 % (ref 0–5)
VARIANT LYMPHS NFR BLD MANUAL: 69 % (ref 19.6–45.3)
WBC NRBC COR # BLD AUTO: 13.23 10*3/MM3 (ref 3.4–10.8)

## 2025-06-26 PROCEDURE — 97535 SELF CARE MNGMENT TRAINING: CPT | Performed by: OCCUPATIONAL THERAPIST

## 2025-06-26 PROCEDURE — 97530 THERAPEUTIC ACTIVITIES: CPT

## 2025-06-26 PROCEDURE — 80048 BASIC METABOLIC PNL TOTAL CA: CPT | Performed by: STUDENT IN AN ORGANIZED HEALTH CARE EDUCATION/TRAINING PROGRAM

## 2025-06-26 PROCEDURE — 82948 REAGENT STRIP/BLOOD GLUCOSE: CPT

## 2025-06-26 PROCEDURE — 85025 COMPLETE CBC W/AUTO DIFF WBC: CPT | Performed by: STUDENT IN AN ORGANIZED HEALTH CARE EDUCATION/TRAINING PROGRAM

## 2025-06-26 PROCEDURE — 97116 GAIT TRAINING THERAPY: CPT

## 2025-06-26 PROCEDURE — 63710000001 INSULIN LISPRO (HUMAN) PER 5 UNITS

## 2025-06-26 PROCEDURE — 84100 ASSAY OF PHOSPHORUS: CPT

## 2025-06-26 PROCEDURE — 97110 THERAPEUTIC EXERCISES: CPT | Performed by: OCCUPATIONAL THERAPIST

## 2025-06-26 PROCEDURE — 25010000002 CEFTRIAXONE PER 250 MG: Performed by: STUDENT IN AN ORGANIZED HEALTH CARE EDUCATION/TRAINING PROGRAM

## 2025-06-26 PROCEDURE — 97535 SELF CARE MNGMENT TRAINING: CPT

## 2025-06-26 PROCEDURE — 85007 BL SMEAR W/DIFF WBC COUNT: CPT | Performed by: STUDENT IN AN ORGANIZED HEALTH CARE EDUCATION/TRAINING PROGRAM

## 2025-06-26 PROCEDURE — 97530 THERAPEUTIC ACTIVITIES: CPT | Performed by: OCCUPATIONAL THERAPIST

## 2025-06-26 PROCEDURE — 83735 ASSAY OF MAGNESIUM: CPT

## 2025-06-26 RX ADMIN — INSULIN LISPRO 2 UNITS: 100 INJECTION, SOLUTION INTRAVENOUS; SUBCUTANEOUS at 11:47

## 2025-06-26 RX ADMIN — AMLODIPINE BESYLATE 10 MG: 5 TABLET ORAL at 09:50

## 2025-06-26 RX ADMIN — Medication 10 ML: at 09:51

## 2025-06-26 RX ADMIN — DOCUSATE SODIUM 100 MG: 100 CAPSULE, LIQUID FILLED ORAL at 09:50

## 2025-06-26 RX ADMIN — CEFTRIAXONE 2000 MG: 2 INJECTION, POWDER, FOR SOLUTION INTRAMUSCULAR; INTRAVENOUS at 09:50

## 2025-06-26 RX ADMIN — ACETAMINOPHEN 650 MG: 325 TABLET, FILM COATED ORAL at 13:48

## 2025-06-26 RX ADMIN — SERTRALINE HYDROCHLORIDE 150 MG: 100 TABLET, FILM COATED ORAL at 09:50

## 2025-06-26 RX ADMIN — PANTOPRAZOLE SODIUM 40 MG: 40 TABLET, DELAYED RELEASE ORAL at 09:50

## 2025-06-26 RX ADMIN — LOSARTAN POTASSIUM 100 MG: 50 TABLET, FILM COATED ORAL at 09:50

## 2025-06-26 RX ADMIN — LEVOTHYROXINE SODIUM 100 MCG: 0.1 TABLET ORAL at 05:20

## 2025-06-26 RX ADMIN — APIXABAN 2.5 MG: 2.5 TABLET, FILM COATED ORAL at 09:50

## 2025-06-26 NOTE — DISCHARGE PLACEMENT REQUEST
"Diane Apple (85 y.o. Female)       Date of Birth   1940    Social Security Number       Address   [address] [place] [address] [address] [place] [facility]    Home Phone   [phone]    MRN   [id number]       Taoism   Non-Adventism    Marital Status                               Admission Date   2025    Admission Type   Emergency    Admitting Provider   Jose Angel Yeung DO    Attending Provider   Jeremiah Null MD    Department, Room/Bed   [facility] INPATIENT,        Discharge Date       Discharge Disposition       Discharge Destination                                 Attending Provider: Jeremiah Null MD    Allergies: No Known Allergies    Isolation: None   Infection: None   Code Status: CPR    Ht: 162.6 cm (64\")   Wt: 75.8 kg (167 lb 3.2 oz)    Admission Cmt: None   Principal Problem: UTI (urinary tract infection) [N39.0]                   Active Insurance as of 2025       Primary Coverage       Payor Plan Insurance Group Employer/Plan Group    HUMANA MEDICARE REPLACEMENT HUMANA MEDICARE ADVANTAGE PPO [id number]       Payor Plan Address Payor Plan Phone Number Payor Plan Fax Number Effective Dates    [address] [address] [phone]  2023 - None Entered    [facility] [id number]         Subscriber Name Subscriber Birth Date Member ID       DIANE APPLE 1940 [id number]                     Emergency Contacts        (Rel.) Home Phone Work Phone Mobile Phone    JESUS SLAUGHTER (Daughter) [phone] -- --    Jose Apple (Son) -- -- [phone]                 History & Physical        ZhongMartha PA-C at 25 1252       Attestation signed by Jose Angel Yeung DO at 25 1519      I have reviewed this documentation and agree.                          [facility]  History & Physical    Patient Name: Diane Apple  : 1940  MRN: [id number]  Primary Care Physician:  Asia Queen, " APRN  Date of admission: 6/24/2025  Date and Time of Service: 6/24/2025 at  1300    Subjective      Chief Complaint:  recurrent falls    History of Present Illness: Diane Guan is a 85 y.o. female with a CMH of CLL, CKD stage 3B, T2DM, HLD, HTN, Hypothyroidism, and hx of DVT on eliquis who presented to [facility] on 6/24/2025 with generalized weakness and increased falls x 1 week. Patient has been weak for many years, but she fell 3-4 times yesterday. She reports that she waited in the floor from 11:30 PM to about 6 AM because she was unable to get up. Pt does live alone. Patient has had decreased appetite. Patient denies head trauma, neck pain, LOC or isolated joint pain. She reports that she is experiencing generalized lower extremity pain bilaterally. Patient is on Eliquis. She reports generalized weakness, nausea, increased urinary frequency, rhinorrhea, and cough. She denies vomiting, diarrhea, constipation, headache, head trauma, changes in bowel habits, dysuria, fever, body aches, shortness of breath, and chest pain. Patient normally is a walker for ambulation at home. She is having some intermittent pain in her bilateral ribs. Patient denies saddle anesthesia and new incontinence.    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/2019    Depression     Diabetes mellitus     Disease of thyroid gland     Diverticulosis of colon 2018    Identified on colonoscopy    DVT (deep venous thrombosis)     Dysphagia 12/2020    Dyspnea on exertion     Fall at home 10/11/2023    Hemorrhoid     Hiatal hernia 12/2020    Hiatal hernia with GERD     large hiatal hernia with high-grade reflux on barium swallow; s/p  laparoscopic fundoplication with gastropexy    History of breast cancer 10/11/2023    History of cigarette smoking 10/11/2023    History of diabetes mellitus     no meds now    HL (hearing loss)     Hyperlipidemia     Hypertension     Knee swelling     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    aspiration pneumonia; completed abx therapy    OAB (overactive bladder)     Personal history of CLL (chronic lymphocytic leukemia) 10/11/2023    Pulmonary emboli     Skin cancer     Sleep apnea     daughter states pt does not have and doesn't have machine       Past Surgical History:   Procedure Laterality Date    BLADDER SURGERY      BREAST BIOPSY      BRONCHOSCOPY N/A 09/13/2019    Procedure: BRONCHOSCOPY with bronchial washing;  Surgeon: Marnie Frankel MD;  Location: [facility] ENDOSCOPY;  Service: Pulmonary    BRONCHOSCOPY Bilateral 10/18/2023    Procedure: BRONCHOSCOPY AT BEDSIDE;  Surgeon: Vinicius Heredia DO;  Location: [facility] ENDOSCOPY;  Service: Pulmonary;  Laterality: Bilateral;    COLONOSCOPY  07/19/2018    severe diverticulosis    CYST REMOVAL      Removed a fatty cyst off of her back    ENDOSCOPY N/A 11/23/2020    Procedure: ESOPHAGOGASTRODUODENOSCOPY with dilatation (Bougie # 48, 50, 52, 54, 56, 58);  Surgeon: Den Plummer DO;  Location: [facility] ENDOSCOPY;  Service: General;  Laterality: N/A;  Post: large hiatal hernia, joshua's ulcers    EYE SURGERY      HERNIA REPAIR      HIATAL HERNIA REPAIR N/A 12/30/2020    Procedure: Laparoscopic hiatal hernia repair with gastropexy;  Surgeon: Den Plummer DO;  Location: [facility] MAIN OR;  Service: General;  Laterality: N/A;    MASTECTOMY Right 02/04/2019    Invasive ductal carcinoma    TUBAL ABDOMINAL LIGATION         Family History: family history includes Arthritis in her father and another family member; Diabetes in her mother; Heart disease in an other family member; Hyperlipidemia in her father; Miscarriages / Stillbirths in her daughter. Otherwise pertinent FHx was  reviewed and not pertinent to current issue.    Social History:  reports that she quit smoking about 33 years ago. Her smoking use included cigarettes. She started smoking about 35 years ago. She has a 1 pack-year smoking history. She has never used smokeless tobacco. She reports that she does not drink alcohol and does not use drugs.    Home Medications:  Prior to Admission Medications       Prescriptions Last Dose Informant Patient Reported? Taking?    amLODIPine (NORVASC) 10 MG tablet 6/24/2025  No Yes    Take 1 tablet by mouth Daily. for high blood pressure    apixaban (ELIQUIS) 2.5 MG tablet tablet 6/24/2025  No Yes    Take 1 tablet by mouth 2 (Two) Times a Day.    chlorthalidone (HYGROTON) 25 MG tablet 6/24/2025  Yes Yes    Take 1 tablet by mouth Daily.    docusate sodium (COLACE) 100 MG capsule 6/24/2025  Yes Yes    Take 1 capsule by mouth 2 (Two) Times a Day. Indications: Constipation    ferrous sulfate 324 (65 Fe) MG tablet delayed-release EC tablet   Yes Yes    Take 1 tablet by mouth 3 (Three) Times a Week. On Monday, Wednesday, and Friday  Indications: Iron Deficiency    irbesartan (AVAPRO) 300 MG tablet 6/23/2025  Yes Yes    Take 1 tablet by mouth Every Night.    levothyroxine (SYNTHROID, LEVOTHROID) 100 MCG tablet 6/24/2025  No Yes    TAKE 1 TABLET BY MOUTH EVERY DAY    metFORMIN (GLUCOPHAGE) 500 MG tablet 6/24/2025  No Yes    TAKE 1 TABLET BY MOUTH EVERY DAY WITH BREAKFAST    mirtazapine (REMERON) 15 MG tablet 6/23/2025  No Yes    TAKE 1 TABLET BY MOUTH EVERYDAY AT BEDTIME    pantoprazole (PROTONIX) 40 MG EC tablet 6/24/2025  Yes Yes    Take 1 tablet by mouth Daily.    rOPINIRole (REQUIP) 0.25 MG tablet 6/23/2025  Yes Yes    Take 1 tablet by mouth Every Night. Take 1 hour before bedtime.    rosuvastatin (CRESTOR) 10 MG tablet 6/23/2025  No Yes    TAKE 1 TABLET BY MOUTH EVERY DAY AT NIGHT    sertraline (ZOLOFT) 100 MG tablet 6/23/2025  No Yes    TAKE 1 AND 1/2 TABLETS BY MOUTH EVERY DAY     metoclopramide (REGLAN) 5 MG tablet   No No    TAKE 1 TABLET BY MOUTH 3 (THREE) TIMES A DAY AS NEEDED (FOR NAUSEA AND VOMITING).              Allergies:  No Known Allergies    Objective      Vitals:   Temp:  [98.1 °F (36.7 °C)] 98.1 °F (36.7 °C)  Heart Rate:  [72-88] 74  Resp:  [17-19] 18  BP: (112-127)/(53-61) 127/56  Body mass index is 28.7 kg/m².  Physical Exam  Vitals reviewed.   Constitutional:       Appearance: Normal appearance.      Interventions: Nasal cannula in place.   HENT:      Head: Normocephalic and atraumatic.   Eyes:      Extraocular Movements: Extraocular movements intact.      Pupils: Pupils are equal, round, and reactive to light.   Cardiovascular:      Rate and Rhythm: Normal rate and regular rhythm.      Pulses: Normal pulses.      Heart sounds: Normal heart sounds.   Pulmonary:      Effort: Pulmonary effort is normal.      Breath sounds: Normal breath sounds.   Abdominal:      General: Abdomen is flat. Bowel sounds are normal.      Palpations: Abdomen is soft.   Musculoskeletal:         General: Signs of injury present. Normal range of motion.      Cervical back: Normal range of motion.      Comments: Scattered ecchymosis on R elbow   Skin:     General: Skin is warm and dry.   Neurological:      General: No focal deficit present.      Mental Status: She is alert and oriented to person, place, and time. Mental status is at baseline.   Psychiatric:         Mood and Affect: Mood normal.         Behavior: Behavior normal.         Diagnostic Data:  Lab Results (last 24 hours)       Procedure Component Value Units Date/Time    Ferritin [[id number]] Collected: 06/24/25 1133    Specimen: Blood Updated: 06/24/25 1252    High Sensitivity Troponin T 1Hr [[id number]]  (Abnormal) Collected: 06/24/25 1133    Specimen: Blood Updated: 06/24/25 1156     HS Troponin T 37 ng/L      Troponin T Numeric Delta -1 ng/L      Troponin T % Delta -3    Narrative:      High Sensitive Troponin T Reference Range:  <14.0  ng/L- Negative Female for AMI  <22.0 ng/L- Negative Male for AMI  >=14 - Abnormal Female indicating possible myocardial injury.  >=22 - Abnormal Male indicating possible myocardial injury.   Clinicians would have to utilize clinical acumen, EKG, Troponin, and serial changes to determine if it is an Acute Myocardial Infarction or myocardial injury due to an underlying chronic condition.         Protime-INR [[id number]]  (Abnormal) Collected: 06/24/25 1133    Specimen: Blood Updated: 06/24/25 1152     Protime 15.9 Seconds      INR 1.27    aPTT [[id number]]  (Normal) Collected: 06/24/25 1133    Specimen: Blood Updated: 06/24/25 1152     PTT 29.9 seconds     Urine Drug Screen - Urine, Clean Catch [[id number]]  (Abnormal) Collected: 06/24/25 1049    Specimen: Urine, Clean Catch Updated: 06/24/25 1152     THC, Screen, Urine Negative     Phencyclidine (PCP), Urine Negative     Cocaine Screen, Urine Negative     Methamphetamine, Ur Negative     Opiate Screen Positive     Amphetamine Screen, Urine Negative     Benzodiazepine Screen, Urine Negative     Tricyclic Antidepressants Screen Negative     Methadone Screen, Urine Negative     Barbiturates Screen, Urine Negative     Oxycodone Screen, Urine Negative     Buprenorphine, Screen, Urine Negative    Narrative:      Cutoff For Drugs Screened:    Amphetamines               500 ng/ml  Barbiturates               200 ng/ml  Benzodiazepines            150 ng/ml  Cocaine                    150 ng/ml  Methadone                  200 ng/ml  Opiates                    100 ng/ml  Phencyclidine               25 ng/ml  THC                         50 ng/ml  Methamphetamine            500 ng/ml  Tricyclic Antidepressants  300 ng/ml  Oxycodone                  100 ng/ml  Buprenorphine               10 ng/ml    The normal value for all drugs tested is negative. This report includes unconfirmed screening results, with the cutoff values listed, to be used for medical treatment purposes only.   Unconfirmed results must not be used for non-medical purposes such as employment or legal testing.  Clinical consideration should be applied to any drug of abuse test, particularly when unconfirmed results are used.    All urine drugs of abuse requests without chain of custody are for medical screening purposes only.  False positives are possible.      T4, Free [[id number]]  (Normal) Collected: 06/24/25 1012    Specimen: Blood Updated: 06/24/25 1149     Free T4 1.05 ng/dL     Urinalysis, Microscopic Only - Urine, Clean Catch [[id number]]  (Abnormal) Collected: 06/24/25 1049    Specimen: Urine, Clean Catch Updated: 06/24/25 1144     RBC, UA None Seen /HPF      WBC, UA 21-50 /HPF      Bacteria, UA 3+ /HPF      Squamous Epithelial Cells, UA None Seen /HPF      Hyaline Casts, UA None Seen /LPF      Methodology Manual Light Microscopy    Urine Culture - Urine, Urine, Clean Catch [[id number]] Collected: 06/24/25 1049    Specimen: Urine, Clean Catch Updated: 06/24/25 1144    POC Lactate [[id number]]  (Normal) Collected: 06/24/25 1137    Specimen: Blood Updated: 06/24/25 1138     Lactate 0.3 mmol/L      Comment: Serial Number: [id number]:  [id number]       Blood Culture - Blood, Arm, Left [[id number]] Collected: 06/24/25 1133    Specimen: Blood from Arm, Left Updated: 06/24/25 1136    Respiratory Panel PCR w/COVID-19(SARS-CoV-2) LESLEE/VIVIANE/PAVAN/PAD/COR/JUAN DAVID In-House, NP Swab in UTM/VTM, 2 HR TAT - Swab, Nasopharynx [[id number]]  (Normal) Collected: 06/24/25 1012    Specimen: Swab from Nasopharynx Updated: 06/24/25 1119     ADENOVIRUS, PCR Not Detected     Coronavirus 229E Not Detected     Coronavirus HKU1 Not Detected     Coronavirus NL63 Not Detected     Coronavirus OC43 Not Detected     COVID19 Not Detected     Human Metapneumovirus Not Detected     Human Rhinovirus/Enterovirus Not Detected     Influenza A PCR Not Detected     Influenza B PCR Not Detected     Parainfluenza Virus 1 Not Detected     Parainfluenza Virus 2 Not  Detected     Parainfluenza Virus 3 Not Detected     Parainfluenza Virus 4 Not Detected     RSV, PCR Not Detected     Bordetella pertussis pcr Not Detected     Bordetella parapertussis PCR Not Detected     Chlamydophila pneumoniae PCR Not Detected     Mycoplasma pneumo by PCR Not Detected    Narrative:      In the setting of a positive respiratory panel with a viral infection PLUS a negative procalcitonin without other underlying concern for bacterial infection, consider observing off antibiotics or discontinuation of antibiotics and continue supportive care. If the respiratory panel is positive for atypical bacterial infection (Bordetella pertussis, Chlamydophila pneumoniae, or Mycoplasma pneumoniae), consider antibiotic de-escalation to target atypical bacterial infection.    Urinalysis With Culture If Indicated - Urine, Clean Catch [[id number]]  (Abnormal) Collected: 06/24/25 1049    Specimen: Urine, Clean Catch Updated: 06/24/25 1111     Color, UA Dark Yellow     Appearance, UA Cloudy     pH, UA <=5.0     Specific Gravity, UA 1.016     Glucose, UA Negative     Ketones, UA Trace     Bilirubin, UA Negative     Blood, UA Small (1+)     Protein, UA 30 mg/dL (1+)     Leuk Esterase, UA Moderate (2+)     Nitrite, UA Negative     Urobilinogen, UA 1.0 E.U./dL    Narrative:      In absence of clinical symptoms, the presence of pyuria, bacteria, and/or nitrites on the urinalysis result does not correlate with infection.    Lipase [[id number]]  (Abnormal) Collected: 06/24/25 1012    Specimen: Blood Updated: 06/24/25 1055     Lipase 12 U/L     BNP [[id number]]  (Normal) Collected: 06/24/25 1012    Specimen: Blood Updated: 06/24/25 1055     proBNP 995.0 pg/mL     Narrative:      This assay is used as an aid in the diagnosis of individuals suspected of having heart failure. It can be used as an aid in the diagnosis of acute decompensated heart failure (ADHF) in patients presenting with signs and symptoms of ADHF to the emergency  department (ED). In addition, NT-proBNP of <300 pg/mL indicates ADHF is not likely.    Age Range Result Interpretation  NT-proBNP Concentration (pg/mL:      <50             Positive            >450                   Gray                 300-450                    Negative             <300    50-75           Positive            >900                  Gray                300-900                  Negative            <300      >75             Positive            >1800                  Gray                300-1800                  Negative            <300    High Sensitivity Troponin T [[id number]]  (Abnormal) Collected: 06/24/25 1012    Specimen: Blood Updated: 06/24/25 1055     HS Troponin T 38 ng/L     Narrative:      High Sensitive Troponin T Reference Range:  <14.0 ng/L- Negative Female for AMI  <22.0 ng/L- Negative Male for AMI  >=14 - Abnormal Female indicating possible myocardial injury.  >=22 - Abnormal Male indicating possible myocardial injury.   Clinicians would have to utilize clinical acumen, EKG, Troponin, and serial changes to determine if it is an Acute Myocardial Infarction or myocardial injury due to an underlying chronic condition.         CK [[id number]]  (Abnormal) Collected: 06/24/25 1012    Specimen: Blood Updated: 06/24/25 1055     Creatine Kinase 348 U/L     TSH Rfx On Abnormal To Free T4 [[id number]]  (Abnormal) Collected: 06/24/25 1012    Specimen: Blood Updated: 06/24/25 1055     TSH 4.260 uIU/mL     Comprehensive Metabolic Panel [[id number]]  (Abnormal) Collected: 06/24/25 1012    Specimen: Blood Updated: 06/24/25 1055     Glucose 130 mg/dL      BUN 34.1 mg/dL      Creatinine 1.51 mg/dL      Sodium 133 mmol/L      Potassium 4.1 mmol/L      Chloride 103 mmol/L      CO2 15.2 mmol/L      Calcium 8.4 mg/dL      Total Protein 6.1 g/dL      Albumin 3.6 g/dL      ALT (SGPT) 19 U/L      AST (SGOT) 35 U/L      Alkaline Phosphatase 84 U/L      Total Bilirubin 0.6 mg/dL      Globulin 2.5 gm/dL      A/G  Ratio 1.4 g/dL      BUN/Creatinine Ratio 22.6     Anion Gap 14.8 mmol/L      eGFR 33.9 mL/min/1.73     Narrative:      GFR Categories in Chronic Kidney Disease (CKD)              GFR Category          GFR (mL/min/1.73)    Interpretation  G1                    90 or greater        Normal or high (1)  G2                    60-89                Mild decrease (1)  G3a                   45-59                Mild to moderate decrease  G3b                   30-44                Moderate to severe decrease  G4                    15-29                Severe decrease  G5                    14 or less           Kidney failure    (1)In the absence of evidence of kidney disease, neither GFR category G1 or G2 fulfill the criteria for CKD.    eGFR calculation 2021 CKD-EPI creatinine equation, which does not include race as a factor    Ethanol [[id number]] Collected: 06/24/25 1012    Specimen: Blood Updated: 06/24/25 1055     Ethanol % <0.010 %     Narrative:      Not for legal purposes.    Magnesium [[id number]]  (Abnormal) Collected: 06/24/25 1012    Specimen: Blood Updated: 06/24/25 1055     Magnesium 1.2 mg/dL     Phosphorus [[id number]]  (Normal) Collected: 06/24/25 1012    Specimen: Blood Updated: 06/24/25 1055     Phosphorus 2.9 mg/dL     CBC & Differential [[id number]]  (Abnormal) Collected: 06/24/25 1012    Specimen: Blood Updated: 06/24/25 1049    Narrative:      The following orders were created for panel order CBC & Differential.  Procedure                               Abnormality         Status                     ---------                               -----------         ------                     CBC Auto Differential[[id number]]        Abnormal            Final result               Scan Slide[[id number]]                                       Final result                 Please view results for these tests on the individual orders.    Manual Differential [[id number]]  (Abnormal) Collected: 06/24/25 1012    Specimen: Blood  Updated: 06/24/25 1049     Neutrophil % 29.0 %      Lymphocyte % 65.0 %      Monocyte % 3.0 %      Bands %  3.0 %      Neutrophils Absolute 7.81 10*3/mm3      Lymphocytes Absolute 15.86 10*3/mm3      Monocytes Absolute 0.73 10*3/mm3      Elliptocytes Slight/1+     Poikilocytes Slight/1+     Smudge Cells Mod/2+     Platelet Estimate Decreased     Giant Platelets Slight/1+    Path Consult Reflex [[id number]] Collected: 06/24/25 1012    Specimen: Blood Updated: 06/24/25 1049     Pathology Review Yes    CBC Auto Differential [[id number]]  (Abnormal) Collected: 06/24/25 1012    Specimen: Blood Updated: 06/24/25 1049     WBC 24.40 10*3/mm3      RBC 3.62 10*6/mm3      Hemoglobin 10.0 g/dL      Hematocrit 33.1 %      MCV 91.4 fL      MCH 27.6 pg      MCHC 30.2 g/dL      RDW 16.1 %      RDW-SD 53.4 fl      MPV 12.3 fL      Platelets 115 10*3/mm3     Narrative:      The previously reported component NRBC is no longer being reported. Previous result was 0.0 /100 WBC (Reference Range: 0.0-0.2 /100 WBC) on 6/24/2025 at 1024 EDT.    Scan Slide [[id number]] Collected: 06/24/25 1012    Specimen: Blood Updated: 06/24/25 1049     Scan Slide --     Comment: See Manual Differential Results       Blood Culture - Blood, Arm, Left [[id number]] Collected: 06/24/25 1012    Specimen: Blood from Arm, Left Updated: 06/24/25 1019             Imaging Results (Last 24 Hours)       Procedure Component Value Units Date/Time    XR Chest 1 View [[id number]] Collected: 06/24/25 1034     Updated: 06/24/25 1039    Narrative:      XR CHEST 1 VW, XR ELBOW 3+ VW RIGHT    Date of Exam: 6/24/2025 10:18 AM EDT    Indication: falls with rib pain    Comparison: 3/11/2025    Findings:  Chest:  Heart is mildly enlarged. There is mild elevation of the right hemidiaphragm similar to the prior studies. Negative for pneumothorax or effusion. No rib fracture within limits of single frontal radiograph. Aortic arch atherosclerosis. Osseous structures   appear  intact.    Right elbow:  Negative for fracture or dislocation. Mild degenerative spurring at the medial epicondyle. No evidence of elbow joint effusion although lateral view is partially limited due to positioning and obliquity.      Impression:      Impression:  Chest:  No acute process.    Right elbow:  Negative for fracture.          Electronically Signed: Pavan Amezquita MD    6/24/2025 10:37 AM EDT    Workstation ID: [id number]    XR Elbow 3+ View Right [[id number]] Collected: 06/24/25 1034     Updated: 06/24/25 1039    Narrative:      XR CHEST 1 VW, XR ELBOW 3+ VW RIGHT    Date of Exam: 6/24/2025 10:18 AM EDT    Indication: falls with rib pain    Comparison: 3/11/2025    Findings:  Chest:  Heart is mildly enlarged. There is mild elevation of the right hemidiaphragm similar to the prior studies. Negative for pneumothorax or effusion. No rib fracture within limits of single frontal radiograph. Aortic arch atherosclerosis. Osseous structures   appear intact.    Right elbow:  Negative for fracture or dislocation. Mild degenerative spurring at the medial epicondyle. No evidence of elbow joint effusion although lateral view is partially limited due to positioning and obliquity.      Impression:      Impression:  Chest:  No acute process.    Right elbow:  Negative for fracture.          Electronically Signed: Pavan Amezquita MD    6/24/2025 10:37 AM EDT    Workstation ID: [id number]              Assessment & Plan        This is a 85 y.o. female with:    Active and Resolved Problems  Active Hospital Problems    Diagnosis  POA    **UTI (urinary tract infection) [N39.0]  Yes      Resolved Hospital Problems   No resolved problems to display.       Impression: Recurrent Falls  Self-care deficit  UTI  CLL  CKD stage 3B  T2DM  HLD  HTN  Hypothyroidism  hx of DVT on eliquis    Plan: Admit to [facility] ED records, labs, and imaging reviewed.     Recurrent Falls  Self-care deficit  UTI  -PT/OT consulted to work with pt and ensure safe dispo.  She will likely need placement in a facility as she lives alone  -Similar presentation 3/11/2025 patient was admitted by hospitalist team with generalized weakness and found to have a UTI.  Urine culture grew Klebsiella, susceptible to Rocephin.  Repeat culture a day later with mixed franci. Blood cultures at that time with No growth  -UA today with dark yellow cloudy appearance, trace ketonuria small blood moderate leukocyte esterase, proteinuria 21-50 WBCs, and 3+ bacteria  - Patient started on Rocephin in the ER, will continue for 4 additional days  - Urine culture and blood cultures pending    CLL: f/u with hematology outpatient  - WBC 24.40, hemoglobin 10, platelets 115, lab abnormalities are consistent with previous labs  -follows with Dr. Bolden  -has not required any treatment since her diagnosis  -receives Venofer infusions outpatient    CKD stage 3B: Creatinine 1.51 on arrival, estimated GFR 33.9.  Near baseline. Mild bump in creatinine  - s/p 1 L IVF  -Continue D5LR for starvation ketosis  -Avoid nephrotoxic medication and contrast  -Avoid hypotension  -Hold home Chlorthalidone, Irbesartan    T2DM: A1c pending  - Holding home antihyperglycemics  - Low-dose sliding scale insulin  - Hypoglycemia protocol ordered    HLD: Continue home statin    HTN: Patient has remained normotensive in the ER  - Continue home amlodipine    Hypothyroidism: TSH mildly elevated at 4.260, free T4 within normal limits  - Continue home Synthroid    hx of DVT and PE on eliquis: Continue home Eliquis  - diagnosed July 2024, dose reduced to 2.5mg BID    VTE Prophylaxis:  Pharmacologic VTE prophylaxis orders are present.        The patient desires to be as follows:    CODE STATUS:    Code Status (Patient has no pulse and is not breathing): CPR (Attempt to Resuscitate)  Medical Interventions (Patient has pulse or is breathing): Full Support  Level Of Support Discussed With: Patient        Monika, daughter, who can be contacted at  [phone], is the designated person to make medical decisions on the patient's behalf if She is incapable of doing so. This was clarified with patient and/or next of kin on 2025 during the course of this H&P.    Admission Status:  I believe this patient meets inpatient status.    Expected Length of Stay: 5 days    PDMP and Medication Dispenses via Sidebar reviewed and consistent with patient reported medications.    I discussed the patient's findings and my recommendations with patient.      Signature:     This document has been electronically signed by Martha Zhong PA-C on 2025 12:53 EDT   [facility]     Electronically signed by Jose Angel Yeung DO at 25 1514       Operative/Procedure Notes (all)    No notes of this type exist for this encounter.          Physician Progress Notes (last 48 hours)        Jeremiah Null MD at 25 1152              [facility] MEDICINE SERVICE  DAILY PROGRESS NOTE    NAME: Diane Guan  : 1940  MRN: [id number]      LOS: 2 days     PROVIDER OF SERVICE: Jeremiah Null MD    Chief Complaint: UTI (urinary tract infection)    Subjective:     Interval History:  History taken from: patient    Improving rapidly.      Review of Systems:   Review of Systems    Objective:     Vital Signs  Temp:  [97.7 °F (36.5 °C)-98.2 °F (36.8 °C)] 97.9 °F (36.6 °C)  Heart Rate:  [70-77] 70  Resp:  [16-28] 21  BP: (106-136)/(48-62) 106/51  Flow (L/min) (Oxygen Therapy):  [4] 4   Body mass index is 28.7 kg/m².    Physical Exam  Physical Exam  HENT:      Head: Atraumatic.      Mouth/Throat:      Mouth: Mucous membranes are moist.   Eyes:      Pupils: Pupils are equal, round, and reactive to light.   Cardiovascular:      Rate and Rhythm: Normal rate and regular rhythm.   Pulmonary:      Effort: Pulmonary effort is normal.      Breath sounds: Normal breath sounds.   Abdominal:      General: Bowel sounds are normal.      Palpations: Abdomen is  soft.   Musculoskeletal:         General: Tenderness present. Normal range of motion.      Cervical back: Neck supple.      Comments: Rt knee    Neurological:      General: No focal deficit present.      Mental Status: She is alert and oriented to person, place, and time.   Psychiatric:         Mood and Affect: Mood normal.            Diagnostic Data    Results from last 7 days   Lab Units 06/26/25  0518 06/25/25  0436 06/24/25  1012   WBC 10*3/mm3 13.23*   < > 24.40*   HEMOGLOBIN g/dL 12.4   < > 10.0*   HEMATOCRIT % 40.9   < > 33.1*   PLATELETS 10*3/mm3 86*   < > 115*   GLUCOSE mg/dL 129*   < > 130*   CREATININE mg/dL 1.42*   < > 1.51*   BUN mg/dL 31.5*   < > 34.1*   SODIUM mmol/L 135*   < > 133*   POTASSIUM mmol/L 3.7   < > 4.1   AST (SGOT) U/L  --   --  35*   ALT (SGPT) U/L  --   --  19   ALK PHOS U/L  --   --  84   BILIRUBIN mg/dL  --   --  0.6   ANION GAP mmol/L 10.4   < > 14.8    < > = values in this interval not displayed.       CT Abdomen Pelvis Stone Protocol  Result Date: 6/25/2025  Impression: 1. 3 mm nonobstructing stone in the left lower renal pole. No ureteral stone. No hydronephrosis. 2. Small quantity air is seen in the urinary bladder lumen. Correlate for recent catheterization. 3. Uncomplicated colonic diverticulosis. 4. Additional chronic findings as described above. Electronically Signed: Marta Carrillo MD  6/25/2025 4:05 PM EDT  Workstation ID: [id number]    XR Knee 1 or 2 View Right  Result Date: 6/25/2025  Impression: 1. Negative for fracture. 2. Moderate osteoarthritis most pronounced in the medial compartment. 3. Small joint effusion. Electronically Signed: Pavan Amezquita MD  6/25/2025 3:37 PM EDT  Workstation ID: [id number]    US Renal Bilateral  Result Date: 6/25/2025  Impression: 1.No hydronephrosis or shadowing calculus. 2.Small bilateral renal cysts. Otherwise normal sonographic appearance of the kidneys. 3.Urinary bladder appears thick walled, which may be accentuated by nondistention.  Correlate clinically for cystitis. Electronically Signed: David Chester MD  6/25/2025 10:31 AM EDT  Workstation ID: [id number]        I reviewed the patient's new clinical results.    Assessment/Plan:     Active and Resolved Problems  Active Hospital Problems    Diagnosis  POA    **UTI (urinary tract infection) [N39.0]  Yes      Resolved Hospital Problems   No resolved problems to display.   Diane Guan is a 85 y.o. female with a CMH of CLL, CKD stage 3B, T2DM, HLD, HTN, Hypothyroidism, and hx of DVT on eliquis who presented to [facility] on 6/24/2025 with generalized weakness and increased falls x 1 week     Assessment and plan  E. coli bacteremia  UTI with cystitis  - Presented with dark urine with increased frequency  - Urinalysis consistent with UTI  -- Urine culture with same organism  -Blood culture grew E. coli pending sensitivity. Will need 2 weeks antibiotics.  - Patient started on ceftriaxone we will continue to monitor side effects  - Similar presentation on 03/2025 found to have UTI with culture Klebsiella sensitive to ceftriaxone  - Renal ultrasound revealed. No hydro    CLL: f/u with hematology outpatient  - WBC 24.40, hemoglobin 10, platelets 115, lab abnormalities are consistent with previous labs  -follows with Dr. Bolden  -has not required any treatment since her diagnosis  -receives Venofer infusions outpatient    Rhabdomyolysis-mild  Continue gentle IV fluid hydration    Recurrent falls  -Right knee pain will x-ray  -Continue pain control  -PT/OT     CKD stage 3B: Creatinine 1.51 on arrival, estimated GFR 33.9.  Near baseline. Mild bump in creatinine  - s/p 1 L IVF  -Continue D5LR for starvation ketosis  -Avoid nephrotoxic medication and contrast  -Avoid hypotension  -Hold home Chlorthalidone, Irbesartan     T2DM: A1c pending  - Holding home antihyperglycemics  - Low-dose sliding scale insulin  - Hypoglycemia protocol ordered     HLD: Continue home statin     HTN: Patient has remained  normotensive in the ER  - Continue home amlodipine     Hypothyroidism: TSH mildly elevated at 4.260, free T4 within normal limits  - Continue home Synthroid     hx of DVT and PE on eliquis: Continue home Eliquis  - diagnosed 2024, dose reduced to 2.5mg BID        VTE Prophylaxis:  Pharmacologic VTE prophylaxis orders are present.             Disposition Planning:     Barriers to Discharge: E. coli bacteremia awaiting final urine and blood culture results  Anticipated Date of Discharge:   Place of Discharge: TBD      Time: 35 minutes     Code Status and Medical Interventions: CPR (Attempt to Resuscitate); Full Support   Ordered at: 25 1252     Code Status (Patient has no pulse and is not breathing):    CPR (Attempt to Resuscitate)     Medical Interventions (Patient has pulse or is breathing):    Full Support     Level Of Support Discussed With:    Patient       Signature: Electronically signed by Jeremiah Null MD, 25, 11:52 EDT.  [facility] Hospitalist Team     Electronically signed by Jeremiah Null MD at 25 1155       Bo Verma MD at 25 0801              [facility] MEDICINE SERVICE  DAILY PROGRESS NOTE    NAME: Diane Guan  : 1940  MRN: [id number]      LOS: 1 day     PROVIDER OF SERVICE: Bo Verma MD    Chief Complaint: UTI (urinary tract infection)    Subjective:     Interval History:  History taken from: patient    Patient denied any fever or chills however was found on the floor by family member after sustaining fall a lot.  CK mildly elevated      Review of Systems:   Review of Systems    Objective:     Vital Signs  Temp:  [98 °F (36.7 °C)-99.8 °F (37.7 °C)] 99.1 °F (37.3 °C)  Heart Rate:  [72-88] 81  Resp:  [16-19] 16  BP: (112-145)/(44-61) 145/56  Flow (L/min) (Oxygen Therapy):  [2-7] 5   Body mass index is 28.7 kg/m².    Physical Exam  Physical Exam  HENT:      Head: Atraumatic.      Mouth/Throat:      Mouth: Mucous membranes are moist.   Eyes:       Pupils: Pupils are equal, round, and reactive to light.   Cardiovascular:      Rate and Rhythm: Normal rate and regular rhythm.   Pulmonary:      Effort: Pulmonary effort is normal.      Breath sounds: Normal breath sounds.   Abdominal:      General: Bowel sounds are normal.      Palpations: Abdomen is soft.   Musculoskeletal:         General: Tenderness present. Normal range of motion.      Cervical back: Neck supple.      Comments: Rt knee    Neurological:      General: No focal deficit present.      Mental Status: She is alert and oriented to person, place, and time.   Psychiatric:         Mood and Affect: Mood normal.            Diagnostic Data    Results from last 7 days   Lab Units 06/25/25  0436 06/24/25  1012   WBC 10*3/mm3 22.96* 24.40*   HEMOGLOBIN g/dL 9.7* 10.0*   HEMATOCRIT % 34.0 33.1*   PLATELETS 10*3/mm3 106* 115*   GLUCOSE mg/dL 134* 130*   CREATININE mg/dL 1.39* 1.51*   BUN mg/dL 30.2* 34.1*   SODIUM mmol/L 135* 133*   POTASSIUM mmol/L 3.8 4.1   AST (SGOT) U/L  --  35*   ALT (SGPT) U/L  --  19   ALK PHOS U/L  --  84   BILIRUBIN mg/dL  --  0.6   ANION GAP mmol/L 13.2 14.8       XR Chest 1 View  Result Date: 6/24/2025  Impression: Chest: No acute process. Right elbow: Negative for fracture. Electronically Signed: Pavan Amezquita MD  6/24/2025 10:37 AM EDT  Workstation ID: [id number]    XR Elbow 3+ View Right  Result Date: 6/24/2025  Impression: Chest: No acute process. Right elbow: Negative for fracture. Electronically Signed: Pavan Amezquita MD  6/24/2025 10:37 AM EDT  Workstation ID: [id number]        I reviewed the patient's new clinical results.    Assessment/Plan:     Active and Resolved Problems  Active Hospital Problems    Diagnosis  POA    **UTI (urinary tract infection) [N39.0]  Yes      Resolved Hospital Problems   No resolved problems to display.   Diane Guan is a 85 y.o. female with a CMH of CLL, CKD stage 3B, T2DM, HLD, HTN, Hypothyroidism, and hx of DVT on eliquis who presented to  [facility] on 6/24/2025 with generalized weakness and increased falls x 1 week     Assessment and plan  E. coli bacteremia  UTI with cystitis  - Presented with dark urine with increased frequency  - Urinalysis consistent with UTI  -Urine culture pending  -Blood culture grew E. coli pending sensitivity  -Will consult ID and pulmonary status  - Patient started on ceftriaxone we will continue to monitor side effects  - Similar presentation on 03/2025 found to have UTI with culture Klebsiella sensitive to ceftriaxone  - Will do renal ultrasound to assess for hydronephrosis/obstructive uropathy    CLL: f/u with hematology outpatient  - WBC 24.40, hemoglobin 10, platelets 115, lab abnormalities are consistent with previous labs  -follows with Dr. Bolden  -has not required any treatment since her diagnosis  -receives Venofer infusions outpatient    Rhabdomyolysis-mild  Continue gentle IV fluid hydration    Recurrent falls  -Right knee pain will x-ray  -Continue pain control  -PT/OT     CKD stage 3B: Creatinine 1.51 on arrival, estimated GFR 33.9.  Near baseline. Mild bump in creatinine  - s/p 1 L IVF  -Continue D5LR for starvation ketosis  -Avoid nephrotoxic medication and contrast  -Avoid hypotension  -Hold home Chlorthalidone, Irbesartan     T2DM: A1c pending  - Holding home antihyperglycemics  - Low-dose sliding scale insulin  - Hypoglycemia protocol ordered     HLD: Continue home statin     HTN: Patient has remained normotensive in the ER  - Continue home amlodipine     Hypothyroidism: TSH mildly elevated at 4.260, free T4 within normal limits  - Continue home Synthroid     hx of DVT and PE on eliquis: Continue home Eliquis  - diagnosed July 2024, dose reduced to 2.5mg BID        VTE Prophylaxis:  Pharmacologic VTE prophylaxis orders are present.             Disposition Planning:     Barriers to Discharge: E. coli bacteremia awaiting final urine and blood culture results  Anticipated Date of Discharge:  6/28  Place of Discharge: TBD      Time: 35 minutes     Code Status and Medical Interventions: CPR (Attempt to Resuscitate); Full Support   Ordered at: 06/24/25 1252     Code Status (Patient has no pulse and is not breathing):    CPR (Attempt to Resuscitate)     Medical Interventions (Patient has pulse or is breathing):    Full Support     Level Of Support Discussed With:    Patient       Signature: Electronically signed by Bo Verma MD, 06/25/25, 08:01 EDT.  [facility] Hospitalist Team     Electronically signed by Bo Verma MD at 06/25/25 1345          Consult Notes (last 48 hours)        Kami Dias APRN at 06/25/25 1140        Consult Orders    1. Inpatient Infectious Diseases Consult [[id number]] ordered by Bo Verma MD at 06/25/25 0810              Attestation signed by Damian Santos MD at 06/26/25 1141      I have reviewed this documentation and agree.                      Infectious Diseases Consult Note    Referring Provider: Bo Verma MD    Reason for Consultation: Bacteremia    Patient Care Team:  Asia Queen APRN as PCP - General (Nurse Practitioner)  Asia Queen APRN as Nurse Practitioner (Nurse Practitioner)  Gregory Rivers MD as Consulting Physician (Hematology and Oncology)    Chief complaint weakness, falls    Subjective     History of present illness:      This is a 85-year-old female who presents to the hospital on 6/24/2025 due to multiple falls and weakness that been worsening.  Patient fell before admission and was on the floor between 11:30 PM and 6 AM until family could come help her.  States that she has noticed increasing weakness of the last several months and is even been getting iron infusions to help with the weakness.  Denies fever and chills, significant shortness of breath or cough, GI symptoms or urinary symptoms.  Does have some back pain but thought that was due to or to the multiple falls    Review of Systems   Review of Systems    Constitutional:  Positive for fatigue.   HENT: Negative.     Eyes: Negative.    Respiratory: Negative.     Cardiovascular: Negative.    Gastrointestinal: Negative.    Endocrine: Negative.    Genitourinary: Negative.    Musculoskeletal:  Positive for back pain.   Skin: Negative.    Neurological:  Positive for weakness.        Falls   Psychiatric/Behavioral: Negative.     All other systems reviewed and are negative.      Medications  Medications Prior to Admission   Medication Sig Dispense Refill Last Dose/Taking    amLODIPine (NORVASC) 10 MG tablet Take 1 tablet by mouth Daily. for high blood pressure 90 tablet 1 6/24/2025    apixaban (ELIQUIS) 2.5 MG tablet tablet Take 1 tablet by mouth 2 (Two) Times a Day. 60 tablet 5 6/24/2025    chlorthalidone (HYGROTON) 25 MG tablet Take 1 tablet by mouth Daily.   6/24/2025    docusate sodium (COLACE) 100 MG capsule Take 1 capsule by mouth 2 (Two) Times a Day. Indications: Constipation  3 6/24/2025    ferrous sulfate 324 (65 Fe) MG tablet delayed-release EC tablet Take 1 tablet by mouth 3 (Three) Times a Week. On Monday, Wednesday, and Friday  Indications: Iron Deficiency   Taking    irbesartan (AVAPRO) 300 MG tablet Take 1 tablet by mouth Every Night.   6/23/2025    levothyroxine (SYNTHROID, LEVOTHROID) 100 MCG tablet TAKE 1 TABLET BY MOUTH EVERY DAY 90 tablet 3 6/24/2025    metFORMIN (GLUCOPHAGE) 500 MG tablet TAKE 1 TABLET BY MOUTH EVERY DAY WITH BREAKFAST 90 tablet 3 6/24/2025    mirtazapine (REMERON) 15 MG tablet TAKE 1 TABLET BY MOUTH EVERYDAY AT BEDTIME 90 tablet 1 6/23/2025    pantoprazole (PROTONIX) 40 MG EC tablet Take 1 tablet by mouth Daily.   6/24/2025    rOPINIRole (REQUIP) 0.25 MG tablet Take 1 tablet by mouth Every Night. Take 1 hour before bedtime.   6/23/2025    rosuvastatin (CRESTOR) 10 MG tablet TAKE 1 TABLET BY MOUTH EVERY DAY AT NIGHT 90 tablet 3 6/23/2025    sertraline (ZOLOFT) 100 MG tablet TAKE 1 AND 1/2 TABLETS BY MOUTH EVERY  tablet 3  6/23/2025    metoclopramide (REGLAN) 5 MG tablet TAKE 1 TABLET BY MOUTH 3 (THREE) TIMES A DAY AS NEEDED (FOR NAUSEA AND VOMITING). 270 tablet 2        History  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/2019    Depression     Diabetes mellitus     Disease of thyroid gland     Diverticulosis of colon 2018    Identified on colonoscopy    DVT (deep venous thrombosis)     Dysphagia 12/2020    Dyspnea on exertion     Fall at home 10/11/2023    Hemorrhoid     Hiatal hernia 12/2020    Hiatal hernia with GERD     large hiatal hernia with high-grade reflux on barium swallow; s/p laparoscopic fundoplication with gastropexy    History of breast cancer 10/11/2023    History of cigarette smoking 10/11/2023    History of diabetes mellitus     no meds now    HL (hearing loss)     Hyperlipidemia     Hypertension     Knee swelling     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    aspiration pneumonia; completed abx therapy    OAB (overactive bladder)     Personal history of CLL (chronic lymphocytic leukemia) 10/11/2023    Pulmonary emboli     Skin cancer     Sleep apnea     daughter states pt does not have and doesn't have machine     Past Surgical History:   Procedure Laterality Date    BLADDER SURGERY      BREAST BIOPSY      BRONCHOSCOPY N/A 09/13/2019    Procedure: BRONCHOSCOPY with bronchial washing;  Surgeon: Marnie Frankel MD;  Location: [facility] ENDOSCOPY;  Service: Pulmonary    BRONCHOSCOPY Bilateral 10/18/2023    Procedure: BRONCHOSCOPY AT BEDSIDE;  Surgeon: Vinicius Heredia DO;  Location: [facility] ENDOSCOPY;  Service: Pulmonary;  Laterality: Bilateral;    COLONOSCOPY  07/19/2018    severe diverticulosis    CYST REMOVAL      Removed a fatty cyst off of her back    ENDOSCOPY N/A 11/23/2020    Procedure:  ESOPHAGOGASTRODUODENOSCOPY with dilatation (Bougie # 48, 50, 52, 54, 56, 58);  Surgeon: Den Plummer DO;  Location: [facility] ENDOSCOPY;  Service: General;  Laterality: N/A;  Post: large hiatal hernia, joshua's ulcers    EYE SURGERY      HERNIA REPAIR      HIATAL HERNIA REPAIR N/A 12/30/2020    Procedure: Laparoscopic hiatal hernia repair with gastropexy;  Surgeon: Den Plummer DO;  Location: [facility] MAIN OR;  Service: General;  Laterality: N/A;    MASTECTOMY Right 02/04/2019    Invasive ductal carcinoma    TUBAL ABDOMINAL LIGATION         Family History  Family History   Problem Relation Age of Onset    Diabetes Mother     Arthritis Other     Heart disease Other     Arthritis Father     Hyperlipidemia Father     Miscarriages / Stillbirths Daughter        Social History   reports that she quit smoking about 33 years ago. Her smoking use included cigarettes. She started smoking about 35 years ago. She has a 1 pack-year smoking history. She has never used smokeless tobacco. She reports that she does not drink alcohol and does not use drugs.    Allergies  Patient has no known allergies.    Objective     Vital Signs   Vital Signs (last 24 hours)         06/24 0700  06/25 0659 06/25 0700  06/25 1141   Most Recent      Temp (°F) 98 -  99.8      99.1     99.1 (37.3) 06/25 0756    Heart Rate 72 -  88      81     81 06/25 0756    Resp 16 -  19      16     16 06/25 0756    /61 -  137/59      145/56     145/56 06/25 0756    SpO2 (%) 88 -  97      95     95 06/25 0756    Flow (L/min) (Oxygen Therapy) 2 -  7      5     5 06/25 0756            Physical Exam:  Physical Exam  Vitals and nursing note reviewed.   Constitutional:       General: She is not in acute distress.     Appearance: She is well-developed and normal weight. She is ill-appearing. She is not diaphoretic.   HENT:      Head: Normocephalic and atraumatic.   Eyes:      General: No scleral icterus.     Extraocular Movements: Extraocular movements intact.       Conjunctiva/sclera: Conjunctivae normal.      Pupils: Pupils are equal, round, and reactive to light.   Cardiovascular:      Rate and Rhythm: Normal rate and regular rhythm.      Heart sounds: Normal heart sounds, S1 normal and S2 normal. No murmur heard.  Pulmonary:      Effort: Pulmonary effort is normal. No respiratory distress.      Breath sounds: Normal breath sounds. No stridor. No wheezing or rales.   Chest:      Chest wall: No tenderness.   Abdominal:      General: Bowel sounds are normal. There is no distension.      Palpations: Abdomen is soft. There is no mass.      Tenderness: There is no abdominal tenderness. There is no guarding.   Musculoskeletal:         General: No swelling, tenderness or deformity. Normal range of motion.      Cervical back: Neck supple.   Skin:     General: Skin is warm and dry.      Coloration: Skin is not pale.      Findings: No bruising, erythema or rash.   Neurological:      General: No focal deficit present.      Mental Status: She is alert and oriented to person, place, and time. Mental status is at baseline.      Cranial Nerves: No cranial nerve deficit.   Psychiatric:         Mood and Affect: Mood normal.         Microbiology  Microbiology Results (last 10 days)       Procedure Component Value - Date/Time    Blood Culture - Blood, Arm, Left [[id number]]  (Abnormal) Collected: 06/24/25 1133    Lab Status: Preliminary result Specimen: Blood from Arm, Left Updated: 06/25/25 0308     Blood Culture Abnormal Stain     Gram Stain Anaerobic Bottle Gram negative bacilli    Blood Culture ID, PCR - Blood, Arm, Left [[id number]]  (Abnormal) Collected: 06/24/25 1133    Lab Status: Final result Specimen: Blood from Arm, Left Updated: 06/25/25 0309     BCID, PCR Escherichia coli. Identification by BCID2 PCR.     BOTTLE TYPE Anaerobic Bottle    Narrative:      No resistance genes detected.    Respiratory Panel PCR w/COVID-19(SARS-CoV-2) LESLEE/VIVIANE/PAVAN/PAD/COR/JUAN DAVID In-House, NP Swab in UTM/VTM,  2 HR TAT - Swab, Nasopharynx [[id number]]  (Normal) Collected: 06/24/25 1012    Lab Status: Final result Specimen: Swab from Nasopharynx Updated: 06/24/25 1119     ADENOVIRUS, PCR Not Detected     Coronavirus 229E Not Detected     Coronavirus HKU1 Not Detected     Coronavirus NL63 Not Detected     Coronavirus OC43 Not Detected     COVID19 Not Detected     Human Metapneumovirus Not Detected     Human Rhinovirus/Enterovirus Not Detected     Influenza A PCR Not Detected     Influenza B PCR Not Detected     Parainfluenza Virus 1 Not Detected     Parainfluenza Virus 2 Not Detected     Parainfluenza Virus 3 Not Detected     Parainfluenza Virus 4 Not Detected     RSV, PCR Not Detected     Bordetella pertussis pcr Not Detected     Bordetella parapertussis PCR Not Detected     Chlamydophila pneumoniae PCR Not Detected     Mycoplasma pneumo by PCR Not Detected    Narrative:      In the setting of a positive respiratory panel with a viral infection PLUS a negative procalcitonin without other underlying concern for bacterial infection, consider observing off antibiotics or discontinuation of antibiotics and continue supportive care. If the respiratory panel is positive for atypical bacterial infection (Bordetella pertussis, Chlamydophila pneumoniae, or Mycoplasma pneumoniae), consider antibiotic de-escalation to target atypical bacterial infection.    Blood Culture - Blood, Arm, Left [[id number]]  (Normal) Collected: 06/24/25 1012    Lab Status: Preliminary result Specimen: Blood from Arm, Left Updated: 06/25/25 1030     Blood Culture No growth at 24 hours    Narrative:      Less than seven (7) mL's of blood was collected.  Insufficient quantity may yield false negative results.            Laboratory  Results from last 7 days   Lab Units 06/25/25  0436   WBC 10*3/mm3 22.96*   HEMOGLOBIN g/dL 9.7*   HEMATOCRIT % 34.0   PLATELETS 10*3/mm3 106*     Results from last 7 days   Lab Units 06/25/25  0436   SODIUM mmol/L 135*   POTASSIUM  mmol/L 3.8   CHLORIDE mmol/L 105   CO2 mmol/L 16.8*   BUN mg/dL 30.2*   CREATININE mg/dL 1.39*   GLUCOSE mg/dL 134*   CALCIUM mg/dL 8.1*     Results from last 7 days   Lab Units 06/25/25  0436   SODIUM mmol/L 135*   POTASSIUM mmol/L 3.8   CHLORIDE mmol/L 105   CO2 mmol/L 16.8*   BUN mg/dL 30.2*   CREATININE mg/dL 1.39*   GLUCOSE mg/dL 134*   CALCIUM mg/dL 8.1*     Results from last 7 days   Lab Units 06/24/25  1628   CK TOTAL U/L 314*               Radiology  Imaging Results (Last 72 Hours)       Procedure Component Value Units Date/Time    US Renal Bilateral [[id number]] Collected: 06/25/25 1026     Updated: 06/25/25 1033    Narrative:      US RENAL BILATERAL    Date of Exam: 6/25/2025 9:54 AM EDT    Indication: assess for pyelonephritis.    Comparison: 10/16/2023    Technique: Grayscale and color Doppler ultrasound evaluation of the kidneys and urinary bladder was performed.      Findings:  There are small bilateral anechoic cysts noted in the kidneys, measuring up to 1.1 cm on the right and 1.8 cm on the left. Kidneys otherwise have a normal sonographic appearance. There is no hydronephrosis or shadowing calculus. Normal cortical thickness   and echogenicity. No perinephric fluid collection identified. Right kidney measures 10.6 x 6.7 x 5.6 cm, left 10.1 x 6.9 x 5.5 cm. Urinary bladder appears thick walled, which may be accentuated by nondistention. Cystitis could also cause this   appearance. Correlate clinically.      Impression:      Impression:  1.No hydronephrosis or shadowing calculus.  2.Small bilateral renal cysts. Otherwise normal sonographic appearance of the kidneys.  3.Urinary bladder appears thick walled, which may be accentuated by nondistention. Correlate clinically for cystitis.        Electronically Signed: David Chester MD    6/25/2025 10:31 AM EDT    Workstation ID: [id number]    XR Chest 1 View [[id number]] Collected: 06/24/25 1034     Updated: 06/24/25 1039    Narrative:      XR CHEST 1 VW, XR  ELBOW 3+ VW RIGHT    Date of Exam: 6/24/2025 10:18 AM EDT    Indication: falls with rib pain    Comparison: 3/11/2025    Findings:  Chest:  Heart is mildly enlarged. There is mild elevation of the right hemidiaphragm similar to the prior studies. Negative for pneumothorax or effusion. No rib fracture within limits of single frontal radiograph. Aortic arch atherosclerosis. Osseous structures   appear intact.    Right elbow:  Negative for fracture or dislocation. Mild degenerative spurring at the medial epicondyle. No evidence of elbow joint effusion although lateral view is partially limited due to positioning and obliquity.      Impression:      Impression:  Chest:  No acute process.    Right elbow:  Negative for fracture.          Electronically Signed: Pavan Amezquita MD    6/24/2025 10:37 AM EDT    Workstation ID: [id number]    XR Elbow 3+ View Right [[id number]] Collected: 06/24/25 1034     Updated: 06/24/25 1039    Narrative:      XR CHEST 1 VW, XR ELBOW 3+ VW RIGHT    Date of Exam: 6/24/2025 10:18 AM EDT    Indication: falls with rib pain    Comparison: 3/11/2025    Findings:  Chest:  Heart is mildly enlarged. There is mild elevation of the right hemidiaphragm similar to the prior studies. Negative for pneumothorax or effusion. No rib fracture within limits of single frontal radiograph. Aortic arch atherosclerosis. Osseous structures   appear intact.    Right elbow:  Negative for fracture or dislocation. Mild degenerative spurring at the medial epicondyle. No evidence of elbow joint effusion although lateral view is partially limited due to positioning and obliquity.      Impression:      Impression:  Chest:  No acute process.    Right elbow:  Negative for fracture.          Electronically Signed: Pavan Amezquita MD    6/24/2025 10:37 AM EDT    Workstation ID: [id number]            Cardiology      Results Review:  I have reviewed all clinical data, test, lab, and imaging results.       Schedule Meds  amLODIPine, 10 mg,  Oral, Daily  apixaban, 2.5 mg, Oral, BID  cefTRIAXone, 2,000 mg, Intravenous, Q24H  docusate sodium, 100 mg, Oral, BID  ferrous sulfate, 325 mg, Oral, Once per day on Monday Wednesday Friday  insulin lispro, 2-7 Units, Subcutaneous, 4x Daily AC & at Bedtime  levothyroxine, 100 mcg, Oral, Q AM  magnesium sulfate, 2 g, Intravenous, Q2H  mirtazapine, 15 mg, Oral, Nightly  pantoprazole, 40 mg, Oral, Daily  rOPINIRole, 0.25 mg, Oral, Nightly  rosuvastatin, 10 mg, Oral, Nightly  sertraline, 150 mg, Oral, Daily  sodium chloride, 10 mL, Intravenous, Q12H        Infusion Meds  dextrose 5 % and lactated Ringer's, 75 mL/hr, Last Rate: 75 mL/hr (06/24/25 1531)        PRN Meds    acetaminophen **OR** acetaminophen **OR** acetaminophen    Calcium Replacement - Follow Nurse / BPA Driven Protocol    dextrose    dextrose    glucagon (human recombinant)    Magnesium Standard Dose Replacement - Follow Nurse / BPA Driven Protocol    metoclopramide    Phosphorus Replacement - Follow Nurse / BPA Driven Protocol    Potassium Replacement - Follow Nurse / BPA Driven Protocol    [COMPLETED] Insert Peripheral IV **AND** sodium chloride    sodium chloride    sodium chloride    traMADol      Assessment & Plan       Assessment    Transient E. coli bacteremia.  Most likely related to urinary tract infection.  Patient's main complaint has been weakness and falls over the last several months.    Urinary tract infection with urinalysis showing pyuria and bacteriuria.  Cultures pending.  Will need to rule out obstructive uropathy    Lymphocytosis-most likely related to the patient's CLL.  Currently only under surveillance    Rhabdomyolysis-patient was on the floor for over 7 hours after a fall at home.  Creatinine and CK were elevated    History of breast cancer status post mastectomies in the past    Type 2 diabetes    Plan    Continue IV Rocephin 2 g every 24 hours.  Patient will need 2 weeks of antimicrobial therapy  CT stone protocol to rule  obstructive uropathy  Waiting on blood cultures to finalize   Waiting on urine culture to finalize  Continue supportive care  A.m. labs  Case discussed with patient and family member at bedside        SIOMARA Sanchez  25  11:41 EDT    Note is dictated utilizing voice recognition software/Dragon    Electronically signed by Damian Santos MD at 25 1141       Nutrition Notes (most recent note)    No notes exist for this encounter.       Speech Language Pathology Notes (most recent note)    No notes exist for this encounter.       Kita Garcia, PT   Physical Therapist  Physical Therapy  Therapy Evaluation     Signed  Date of Service:  25  Creation Time:  25     Signed        Expand All Collapse All  Patient Name: Diane Guan                : 1940                        MRN: [id number]                              Today's Date: 2025                                   Admit Date: 2025                        Visit Dx:   Visit Diagnosis       ICD-10-CM ICD-9-CM   1. Urinary tract infection with hematuria, site unspecified  N39.0 599.0     R31.9 599.70   2. Anemia, unspecified type  D64.9 285.9   3. Leukocytosis, unspecified type  D72.829 288.60   4. Frequent falls  R29.6 V15.88   5. Thrombocytopenia  D69.6 287.5   6. Right elbow pain  M25.521 719.42         Problem List       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    Acquired hypothyroidism    Type 2 diabetes mellitus with stage 3b chronic kidney disease, without long-term current use of insulin    Insomnia    Postmenopausal status    Shoulder pain    Overactive bladder     Vitamin B 12 deficiency    Seasonal allergies    Absolute anemia    Vitamin D deficiency    ILBBY (obstructive sleep apnea)    Nocturnal hypoxia    Acute respiratory failure with hypoxia and hypercapnia    Abdominal pain    COPD (chronic obstructive pulmonary disease)    CKD (chronic kidney disease) stage 3, GFR 30-59 ml/min    Sepsis due to pneumonia    Acute on chronic respiratory failure with hypoxemia    Class 1 obesity due to excess calories with serious comorbidity and body mass index (BMI) of 32.0 to 32.9 in adult    Acute hypoxemic respiratory failure    History of breast cancer    Personal history of CLL (chronic lymphocytic leukemia)    History of cigarette smoking    Fall at home    Rhinovirus infection    Acute respiratory failure with hypoxia    Hyperkalemia    Respiratory failure    Leukocytosis    Iron deficiency anemia    Malabsorption of iron    UTI (urinary tract infection)         Medical History        Past Medical History:   Diagnosis Date    Acute bronchitis due to human metapneumovirus 02/08/2020    Anxiety disorder      Arthritis      Arthritis of back      Breast cancer 02/2019     Invasive ductal carcinoma    Chronic lymphocytic leukemia (CLL), B-cell 01/2019    Depression      Diabetes mellitus      Disease of thyroid gland      Diverticulosis of colon 2018     Identified on colonoscopy    DVT (deep venous thrombosis)      Dysphagia 12/2020    Dyspnea on exertion      Fall at home 10/11/2023    Hemorrhoid      Hiatal hernia 12/2020    Hiatal hernia with GERD       large hiatal hernia with high-grade reflux on barium swallow; s/p laparoscopic fundoplication with gastropexy    History of breast cancer 10/11/2023    History of cigarette smoking 10/11/2023    History of diabetes mellitus       no meds now    HL (hearing loss)      Hyperlipidemia      Hypertension      Knee swelling      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     aspiration pneumonia; completed abx therapy    OAB (overactive  bladder)      Personal history of CLL (chronic lymphocytic leukemia) 10/11/2023    Pulmonary emboli      Skin cancer      Sleep apnea       daughter states pt does not have and doesn't have machine         Surgical History         Past Surgical History:   Procedure Laterality Date    BLADDER SURGERY        BREAST BIOPSY        BRONCHOSCOPY N/A 09/13/2019     Procedure: BRONCHOSCOPY with bronchial washing;  Surgeon: Marnie Frankel MD;  Location: [facility] ENDOSCOPY;  Service: Pulmonary    BRONCHOSCOPY Bilateral 10/18/2023     Procedure: BRONCHOSCOPY AT BEDSIDE;  Surgeon: Vinicius Heredia DO;  Location: [facility] ENDOSCOPY;  Service: Pulmonary;  Laterality: Bilateral;    COLONOSCOPY   07/19/2018     severe diverticulosis    CYST REMOVAL         Removed a fatty cyst off of her back    ENDOSCOPY N/A 11/23/2020     Procedure: ESOPHAGOGASTRODUODENOSCOPY with dilatation (Bougie # 48, 50, 52, 54, 56, 58);  Surgeon: Den Plummer DO;  Location: [facility] ENDOSCOPY;  Service: General;  Laterality: N/A;  Post: large hiatal hernia, joshua's ulcers    EYE SURGERY        HERNIA REPAIR        HIATAL HERNIA REPAIR N/A 12/30/2020     Procedure: Laparoscopic hiatal hernia repair with gastropexy;  Surgeon: Den Plummer DO;  Location: [facility] MAIN OR;  Service: General;  Laterality: N/A;    MASTECTOMY Right 02/04/2019     Invasive ductal carcinoma    TUBAL ABDOMINAL LIGATION               General Information         Row Name 06/24/25 1629                 Physical Therapy Time and Intention     Document Type evaluation  -       Mode of Treatment physical therapy  -          Row Name 06/24/25 1628                 General Information     Patient Profile Reviewed yes  -AH       Prior Level of Function independent:;all household mobility;community mobility;ADL's  -       Existing Precautions/Restrictions fall  -       Barriers to Rehab medically complex  -          Row Name 06/24/25 3674                 Living Environment      Current Living Arrangements home  -       People in Home spouse  -[facility] Name 06/24/25 1629                 Home Main Entrance     Number of Stairs, Main Entrance none  -[facility] Name 06/24/25 1629                 Stairs Within Home, Primary     Number of Stairs, Within Home, Primary none  -[facility] Name 06/24/25 1629                 Cognition     Orientation Status (Cognition) oriented x 4  -[facility] Name 06/24/25 1629                 Safety Issues/Impairments Affecting Functional Mobility     Impairments Affecting Function (Mobility) balance;endurance/activity tolerance;strength;pain;postural/trunk control  -                       User Key  (r) = Recorded By, (t) = Taken By, (c) = Cosigned By        Initials Name Provider Type      Kita Garcia, NELSON Physical Therapist                            Mobility         [facility] Name 06/24/25 1634                 Bed Mobility     Bed Mobility bed mobility (all) activities  -       All Activities, [place] (Bed Mobility) moderate assist (50% patient effort);2 person assist  -       Assistive Device (Bed Mobility) head of bed elevated  -[facility] Name 06/24/25 1634                 Bed-Chair Transfer     Bed-Chair [place] (Transfers) unable to assess  -AH          Row Name 06/24/25 1634                 Sit-Stand Transfer     Sit-Stand [place] (Transfers) unable to assess  -[facility] Name 06/24/25 1634                 Gait/Stairs (Locomotion)     Patient was able to Ambulate no, other medical factors prevent ambulation  -       Reason Patient was unable to Ambulate Dizziness;Uncontrolled Pain  -                       User Key  (r) = Recorded By, (t) = Taken By, (c) = Cosigned By        Initials Name Provider Type     Kita Zimmerman, PT Physical Therapist                            Obj/Interventions         [facility] Name 06/24/25 1634                 Range of Motion Comprehensive     General  Range of Motion bilateral lower extremity ROM WFL  -[facility] Name 06/24/25 1634                 Strength Comprehensive (MMT)     General Manual Muscle Testing (MMT) Assessment lower extremity strength deficits identified  -       Comment, General Manual Muscle Testing (MMT) Assessment BLE grossly 3-/5, limited by pain  -[facility] Name 06/24/25 1634                 Balance     Balance Assessment sitting static balance  -       Static Sitting Balance moderate assist  -       Position, Sitting Balance unsupported;sitting edge of bed  -[facility] Name 06/24/25 1634                 Sensory Assessment (Somatosensory)     Sensory Assessment (Somatosensory) sensation intact  -                       User Key  (r) = Recorded By, (t) = Taken By, (c) = Cosigned By        Initials Name Provider Type      Kita Garcia, PT Physical Therapist                            Goals/Plan         Row Name 06/24/25 1641                 Bed Mobility Goal 1 (PT)     Activity/Assistive Device (Bed Mobility Goal 1, PT) bed mobility activities, all  -       [place] Level/Cues Needed (Bed Mobility Goal 1, PT) independent  -       Time Frame (Bed Mobility Goal 1, PT) long term goal (LTG);2 weeks  -AH          Row Name 06/24/25 1641                 Transfer Goal 1 (PT)     Activity/Assistive Device (Transfer Goal 1, PT) transfers, all  -       [place] Level/Cues Needed (Transfer Goal 1, PT) independent  -       Time Frame (Transfer Goal 1, PT) long term goal (LTG);2 weeks  -AH          Row Name 06/24/25 1641                 Gait Training Goal 1 (PT)     Activity/Assistive Device (Gait Training Goal 1, PT) gait (walking locomotion)  -       [place] Level (Gait Training Goal 1, PT) modified independence  -       Distance (Gait Training Goal 1, PT) 60 ft  -       Time Frame (Gait Training Goal 1, PT) long term goal (LTG);2 weeks  -AH          Row Name 06/24/25 1641                  Therapy Assessment/Plan (PT)     Planned Therapy Interventions (PT) balance training;bed mobility training;gait training;transfer training;strengthening;patient/family education;postural re-education  -                    User Key  (r) = Recorded By, (t) = Taken By, (c) = Cosigned By        Initials Name Provider Type      Kita Garcia, PT Physical Therapist                         Clinical Impression         Row Name 06/24/25 1635                 Pain     Pretreatment Pain Rating 8/10  -       Posttreatment Pain Rating 9/10  -       Pain Side/Orientation generalized  -          Row Name 06/24/25 1635                 Plan of Care Review     Plan of Care Reviewed With patient;child  -       Outcome Evaluation Diane Guan is an 85 y.o. female with medical hx of CLL, CKD, DM, HLD, HTN and hypothyroidism who presented to [facility] on 6/24/25 after multiple falls. Pt lives with her spouse who has dementia in a [facility] with no [facility]. Pt has a , BSC, hospital bed and wheelchair available and is typically IND with mobility and ADLs, assists her  as needed. Today pt is A&Ox4. She is very painful but willing to attempt to get to EOB. She requires ModA of 2 supine to sit EOB and immediately becomes dizzy needing to return to supine. Pt below baseline at this time and not safe to return home. PT recommending SNF upon d/c once medically stable. PT will follow and progress as tolerated.  -          Row Name 06/24/25 1635                 Therapy Assessment/Plan (PT)     Rehab Potential (PT) good  -       Criteria for Skilled Interventions Met (PT) yes;skilled treatment is necessary  -       Therapy Frequency (PT) 5 times/wk  -          Row Name 06/24/25 1635                 Vital Signs     Pre Systolic BP Rehab 126  -AH       Pre Treatment Diastolic BP 42  -AH       Post Systolic BP Rehab 134  -AH       Post Treatment Diastolic BP 83  -AH       Pre SpO2 (%) 94  -AH       O2 Delivery Pre Treatment  supplemental O2  -AH       O2 Delivery Intra Treatment supplemental O2  -AH       O2 Delivery Post Treatment supplemental O2  -AH       Pre Patient Position Supine  -AH       Intra Patient Position Sitting  -AH       Post Patient Position Supine  -AH          Row Name 06/24/25 1635                 Positioning and Restraints     Pre-Treatment Position in bed  -AH       Post Treatment Position bed  -AH       In Bed notified nsg;fowlers;call light within reach;encouraged to call for assist;with family/caregiver  -                       User Key  (r) = Recorded By, (t) = Taken By, (c) = Cosigned By        Initials Name Provider Type      Kita Garcia, PT Physical Therapist                            Outcome Measures    No documentation.                            Physical Therapy Education            Title: PT OT SLP Therapies (In Progress)         Topic: Physical Therapy (In Progress)         Point: Mobility training (Done)         Learning Progress Summary             Patient Acceptance, E, VU by  at 6/24/2025 1644                            Point: Body mechanics (Done)         Learning Progress Summary             Patient Acceptance, E, VU by  at 6/24/2025 1644                            Point: Precautions (Not Started)         Learner Progress:  Not documented in this visit.                                      User Key         Initials Effective Dates Name Provider Type [facility] 08/12/24 -  Kita Garcia, PT Physical Therapist PT                          PT Recommendation and Plan  Planned Therapy Interventions (PT): balance training, bed mobility training, gait training, transfer training, strengthening, patient/family education, postural re-education  Outcome Evaluation: Diane Guan is an 85 y.o. female with medical hx of CLL, CKD, DM, HLD, HTN and hypothyroidism who presented to [facility] on 6/24/25 after multiple falls. Pt lives with her spouse who has dementia in a [facility]  with no [facility]. Pt has a RW, BSC, hospital bed and wheelchair available and is typically IND with mobility and ADLs, assists her  as needed. Today pt is A&Ox4. She is very painful but willing to attempt to get to EOB. She requires ModA of 2 supine to sit EOB and immediately becomes dizzy needing to return to supine. Pt below baseline at this time and not safe to return home. PT recommending SNF upon d/c once medically stable. PT will follow and progress as tolerated.      Time Calculation:        PT Charges         Row Name 25 1644                       Time Calculation     Start Time 1435  -         Stop Time 1455  -         Time Calculation (min) 20 min  -         PT Received On 25  -         PT - Next Appointment 25  -         PT Goal Re-Cert Due Date 25  -                      User Key  (r) = Recorded By, (t) = Taken By, (c) = Cosigned By        Initials Name Provider Type      Kita Garcia, PT Physical Therapist                       Therapy Charges for Today         Code Description Service Date Service Provider Modifiers Qty     29974757702 HC PT EVAL MOD COMPLEXITY 4 2025 Kita Garcia, PT GP 1                PT Discharge Summary  Anticipated Discharge Disposition (PT): skilled nursing facility     Kita Garcia, PT                       2025                                    Noam Dunn OT   Occupational Therapist  Specialty:  Occupational Therapy  Therapy Evaluation     Signed  Date of Service:  25  Creation Time:  25     Signed        Expand All Collapse All  Patient Name: Diane Guan                : 1940                        MRN: [id number]                              Today's Date: 2025                                   Admit Date: 2025                        Visit Dx:   Visit Diagnosis       ICD-10-CM ICD-9-CM   1. Urinary tract infection with hematuria, site  unspecified  N39.0 599.0     R31.9 599.70   2. Anemia, unspecified type  D64.9 285.9   3. Leukocytosis, unspecified type  D72.829 288.60   4. Frequent falls  R29.6 V15.88   5. Thrombocytopenia  D69.6 287.5   6. Right elbow pain  M25.521 719.42         Problem List       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    Acquired hypothyroidism    Type 2 diabetes mellitus with stage 3b chronic kidney disease, without long-term current use of insulin    Insomnia    Postmenopausal status    Shoulder pain    Overactive bladder    Vitamin B 12 deficiency    Seasonal allergies    Absolute anemia    Vitamin D deficiency    LIBBY (obstructive sleep apnea)    Nocturnal hypoxia    Acute respiratory failure with hypoxia and hypercapnia    Abdominal pain    COPD (chronic obstructive pulmonary disease)    CKD (chronic kidney disease) stage 3, GFR 30-59 ml/min    Sepsis due to pneumonia    Acute on chronic respiratory failure with hypoxemia    Class 1 obesity due to excess calories with serious comorbidity and body mass index (BMI) of 32.0 to 32.9 in adult    Acute hypoxemic respiratory failure    History of breast cancer    Personal history of CLL (chronic lymphocytic leukemia)    History of cigarette smoking    Fall at home    Rhinovirus infection    Acute respiratory failure with hypoxia    Hyperkalemia    Respiratory failure    Leukocytosis    Iron deficiency anemia    Malabsorption of iron    UTI (urinary tract infection)         Medical History        Past Medical History:   Diagnosis Date    Acute bronchitis due to human metapneumovirus 02/08/2020    Anxiety disorder      Arthritis      Arthritis of back       Breast cancer 02/2019     Invasive ductal carcinoma    Chronic lymphocytic leukemia (CLL), B-cell 01/2019    Depression      Diabetes mellitus      Disease of thyroid gland      Diverticulosis of colon 2018     Identified on colonoscopy    DVT (deep venous thrombosis)      Dysphagia 12/2020    Dyspnea on exertion      Fall at home 10/11/2023    Hemorrhoid      Hiatal hernia 12/2020    Hiatal hernia with GERD       large hiatal hernia with high-grade reflux on barium swallow; s/p laparoscopic fundoplication with gastropexy    History of breast cancer 10/11/2023    History of cigarette smoking 10/11/2023    History of diabetes mellitus       no meds now    HL (hearing loss)      Hyperlipidemia      Hypertension      Knee swelling      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     aspiration pneumonia; completed abx therapy    OAB (overactive bladder)      Personal history of CLL (chronic lymphocytic leukemia) 10/11/2023    Pulmonary emboli      Skin cancer      Sleep apnea       daughter states pt does not have and doesn't have machine         Surgical History         Past Surgical History:   Procedure Laterality Date    BLADDER SURGERY        BREAST BIOPSY        BRONCHOSCOPY N/A 09/13/2019     Procedure: BRONCHOSCOPY with bronchial washing;  Surgeon: Marnie Frankel MD;  Location: [facility] ENDOSCOPY;  Service: Pulmonary    BRONCHOSCOPY Bilateral 10/18/2023     Procedure: BRONCHOSCOPY AT BEDSIDE;  Surgeon: Vinicius Heredia DO;  Location: [facility] ENDOSCOPY;  Service: Pulmonary;  Laterality: Bilateral;    COLONOSCOPY   07/19/2018     severe diverticulosis    CYST REMOVAL         Removed a fatty cyst off of her back    ENDOSCOPY N/A 11/23/2020     Procedure: ESOPHAGOGASTRODUODENOSCOPY with dilatation (Bougie # 48, 50, 52, 54, 56, 58);  Surgeon: Den Plummer DO;  Location: [facility] ENDOSCOPY;  Service: General;  Laterality: N/A;  Post: large hiatal hernia, joshua's ulcers    EYE SURGERY        HERNIA REPAIR        HIATAL  HERNIA REPAIR N/A 12/30/2020     Procedure: Laparoscopic hiatal hernia repair with gastropexy;  Surgeon: Den Plummer DO;  Location: [facility] MAIN OR;  Service: General;  Laterality: N/A;    MASTECTOMY Right 02/04/2019     Invasive ductal carcinoma    TUBAL ABDOMINAL LIGATION               General Information         Row Name 06/24/25 1715                 OT Time and Intention     Document Type evaluation  -MP       Mode of Treatment occupational therapy  -MP          Row Name 06/24/25 1715                 General Information     Patient Profile Reviewed yes  -MP       Prior Level of Function independent:;ADL's  -MP       Existing Precautions/Restrictions fall  -MP          Row Name 06/24/25 1715                 Living Environment     Current Living Arrangements home  -MP          Row Name 06/24/25 1715                 Cognition     Orientation Status (Cognition) oriented x 4  -MP          Row Name 06/24/25 1715                 Safety Issues/Impairments Affecting Functional Mobility     Impairments Affecting Function (Mobility) balance;endurance/activity tolerance;strength;pain;postural/trunk control  -MP                       User Key  (r) = Recorded By, (t) = Taken By, (c) = Cosigned By        Initials Name Provider Type     Noam Hahn OT Occupational Therapist                                     Mobility/ADL's         Row Name 06/24/25 1716                 Bed Mobility     Bed Mobility bed mobility (all) activities  -MP       All Activities, [place] (Bed Mobility) moderate assist (50% patient effort);2 person assist  -MP                       User Key  (r) = Recorded By, (t) = Taken By, (c) = Cosigned By        Initials Name Provider Type     Naom Hahn OT Occupational Therapist                            Obj/Interventions         Row Name 06/24/25 1721                 Range of Motion Comprehensive     Comment, General Range of Motion B shoulder AROM impacted 25-50%  -MP           Row Name 06/24/25 1721                 Strength Comprehensive (MMT)     Comment, General Manual Muscle Testing (MMT) Assessment BUE 3/5  -MP          Row Name 06/24/25 1721                 Balance     Balance Interventions sitting;standing;supported;sit to stand;static;dynamic  -MP                       User Key  (r) = Recorded By, (t) = Taken By, (c) = Cosigned By        Initials Name Provider Type     Noam Hahn OT Occupational Therapist                            Goals/Plan         Row Name 06/24/25 1727                 Bed Mobility Goal 1 (OT)     Activity/Assistive Device (Bed Mobility Goal 1, OT) bed mobility activities, all  -MP       [place] Level/Cues Needed (Bed Mobility Goal 1, OT) minimum assist (75% or more patient effort)  -MP       Time Frame (Bed Mobility Goal 1, OT) long term goal (LTG);2 weeks  -MP          Row Name 06/24/25 1727                 Transfer Goal 1 (OT)     Activity/Assistive Device (Transfer Goal 1, OT) sit-to-stand/stand-to-sit;toilet  -MP       [place] Level/Cues Needed (Transfer Goal 1, OT) minimum assist (75% or more patient effort)  -MP       Time Frame (Transfer Goal 1, OT) long term goal (LTG);2 weeks  -MP          Row Name 06/24/25 1727                 Dressing Goal 1 (OT)     Activity/Device (Dressing Goal 1, OT) lower body dressing  -MP       [place]/Cues Needed (Dressing Goal 1, OT) minimum assist (75% or more patient effort)  -MP       Time Frame (Dressing Goal 1, OT) long term goal (LTG);2 weeks  -MP                    User Key  (r) = Recorded By, (t) = Taken By, (c) = Cosigned By        Initials Name Provider Type     Noam Hahn OT Occupational Therapist                         Clinical Impression         Row Name 06/24/25 1723                 Pain Assessment     Pretreatment Pain Rating 8/10  -MP       Posttreatment Pain Rating 9/10  -MP          Row Name 06/24/25 1723                 Plan of Care Review     Plan of Care Reviewed  With patient  -MP       Progress no change  -MP       Outcome Evaluation Ptr. is an 85 y.o. female with medical hx of CLL, CKD, DM, HLD, HTN and hypothyroidism who presented to [facility] on [facility]/24/25 after multiple falls. Pt lives with her spouse who has dementia in a H with no SAMY. Pt. is ADL independent at baseline, assists in the care of her spouse. States that she has had increased weakness x 3 weeks. Pt. requires mod A x 2 for all bed mobility this date, limited sitting tolerance due to fatigue. Pt. provided complete assist for all LB ADLs. Pt. is not safe to d/c home at this time and will require SNF placement to address aforementioned deficits.  -MP          Row Name 06/24/25 1723                 Therapy Assessment/Plan (OT)     Rehab Potential (OT) good  -MP       Criteria for Skilled Therapeutic Interventions Met (OT) yes;meets criteria;skilled treatment is necessary  -MP       Therapy Frequency (OT) 3 times/wk  -MP          Row Name 06/24/25 1723                 Therapy Plan Review/Discharge Plan (OT)     Anticipated Discharge Disposition (OT) skilled nursing facility  -MP          Row Name 06/24/25 1723                 Vital Signs     Pre Patient Position Supine  -MP       Intra Patient Position Sitting  -MP       Post Patient Position Supine  -MP          Row Name 06/24/25 1723                 Positioning and Restraints     Pre-Treatment Position in bed  -MP       Post Treatment Position bed  -MP       In Bed supine;call light within reach;encouraged to call for assist  -MP                       User Key  (r) = Recorded By, (t) = Taken By, (c) = Cosigned By        Initials Name Provider Type     Noam Hahn OT Occupational Therapist                            Outcome Measures    No documentation.                               Occupational Therapy Education            Title: PT OT SLP Therapies (In Progress)         Topic: Occupational Therapy (In Progress)         Point: Body mechanics (Done)          Learning Progress Summary             Patient Acceptance, E,TB, VU by  at 6/24/2025 1727                                                User Key         Initials Effective Dates Name Provider Type Discipline      06/16/21 -  Noam Dunn OT Occupational Therapist OT                          OT Recommendation and Plan  Therapy Frequency (OT): 3 times/wk  Plan of Care Review  Plan of Care Reviewed With: patient  Progress: no change  Outcome Evaluation: Ptr. is an 85 y.o. female with medical hx of CLL, CKD, DM, HLD, HTN and hypothyroidism who presented to [facility] on 6/24/25 after multiple falls. Pt lives with her spouse who has dementia in a [facility] with no SAMY. Pt. is ADL independent at baseline, assists in the care of her spouse. States that she has had increased weakness x 3 weeks. Pt. requires mod A x 2 for all bed mobility this date, limited sitting tolerance due to fatigue. Pt. provided complete assist for all LB ADLs. Pt. is not safe to d/c home at this time and will require SNF placement to address aforementioned deficits.      Time Calculation:        Time Calculation- OT         Row Name 06/24/25 1727                       Time Calculation- OT     OT Start Time 1435  -         OT Stop Time 1455  -         OT Time Calculation (min) 20 min  -         Total Timed Code Minutes- OT 0 minute(s)  -         OT Received On 06/24/25  -         OT - Next Appointment 06/26/25  -         OT Goal Re-Cert Due Date 07/08/25  -                      User Key  (r) = Recorded By, (t) = Taken By, (c) = Cosigned By        Initials Name Provider Type      Noam Dunn OT Occupational Therapist                       Therapy Charges for Today         Code Description Service Date Service Provider Modifiers Qty     [id number]  OT EVAL LOW COMPLEXITY 4 6/24/2025 Noam Dunn OT GO 1                   Noam Dunn OT                     6/24/2025                Kita Garcia, PT  "  Physical Therapist  Physical Therapy  Therapy Treatment Note     Addendum  Date of Service:  06/26/25 1118  Creation Time:  06/26/25 1118     Subjective: Pt agreeable to therapeutic plan of care. Pt states she feels much better today.     Objective:      Precautions - falls, suppl O2     Bed mobility - SBA supine to sit EOB  Transfers - Min-A and with rolling walker STS from EOB  Ambulation - 10 feet CGA and with rolling walker     Vitals: WNL     Pain: 4 Gtz-Baker Location: ronak knees  Intervention for pain: Increased Activity and Therapeutic Presence     Education: Provided education on the importance of mobility in the acute care setting, Verbal/Tactile Cues, Transfer Training, Gait Training, and Energy conservation strategies     Assessment: Diane Guan presents with functional mobility impairments which indicate the need for skilled intervention. Pt reports feeling better today, has been up to the bathroom with staff. She is currently on 4L O2 at 94%, typically on 2L at home. Pt continues to demos poor endurance and activity tolerance, fatigues quickly with light activity and decreased gait speed. Discussed energy conservation strategies and to mobilize several times a day with staff to prevent mobility decline while IP. Tolerating session today without incident. Will continue to follow and progress as tolerated.      Plan/Recommendations:   If medically appropriate, Moderate Intensity Therapy recommended post-acute care. This is recommended as therapy feels the patient would require 3-4 days per week and wouldn't tolerate \"3 hour daily\" rehab intensity. SNF would be the preferred choice. If the patient does not agree to SNF, arrange HH or OP depending on home bound status. If patient is medically complex, consider LTACH. Pt requires no DME at discharge.      Pt desires Skilled Rehab placement at discharge. Pt cooperative; agreeable to therapeutic recommendations and plan of care.      Modified [place]: " N/A = No pre-op stroke/TIA     Post-Tx Position: Up in Chair, Alarms activated, and Call light and personal items within reach  PPE: gloves     Therapy Charges for Today         Code Description Service Date Service Provider Modifiers Qty     82578318339  PT THERAPEUTIC ACT EA 15 MIN 6/26/2025 Kita Garcia, PT GP 1     71300351154 HC GAIT TRAINING EA 15 MIN 6/26/2025 Kita Garcia, PT GP 1     [id number]  PT SELF CARE/MGMT/TRAIN EA 15 MIN 6/26/2025 Kita Garcia, PT GP 1               PT Charges         Row Name 06/26/25 1118                       Time Calculation     Start Time 1027  -         Stop Time 1053  -         Time Calculation (min) 26 min  -         PT Received On 06/26/25  -         PT - Next Appointment 06/27/25  -                 Time Calculation- PT     Total Timed Code Minutes- PT 26 minute(s)  -                      User Key  (r) = Recorded By, (t) = Taken By, (c) = Cosigned By        Initials Name Provider Type      Kita Garcia, PT Physical Therapist

## 2025-06-26 NOTE — PLAN OF CARE
Problem: Skin Injury Risk Increased  Goal: Skin Health and Integrity  Outcome: Progressing  Intervention: Optimize Skin Protection  Recent Flowsheet Documentation  Taken 6/26/2025 0855 by Shyanne Frost RN  Activity Management: activity encouraged  Pressure Reduction Techniques: frequent weight shift encouraged  Head of Bed (HOB) Positioning: HOB elevated  Pressure Reduction Devices: pressure-redistributing mattress utilized  Skin Protection: incontinence pads utilized     Problem: Fall Injury Risk  Goal: Absence of Fall and Fall-Related Injury  Outcome: Progressing  Intervention: Identify and Manage Contributors  Recent Flowsheet Documentation  Taken 6/26/2025 0855 by Shyanne Frost RN  Medication Review/Management: medications reviewed  Self-Care Promotion: independence encouraged  Intervention: Promote Injury-Free Environment  Recent Flowsheet Documentation  Taken 6/26/2025 1230 by Shyanne Frost RN  Safety Promotion/Fall Prevention:   fall prevention program maintained   safety round/check completed  Taken 6/26/2025 1030 by Shyanne Frost RN  Safety Promotion/Fall Prevention:   fall prevention program maintained   safety round/check completed  Taken 6/26/2025 0855 by Shyanne Frost RN  Safety Promotion/Fall Prevention:   fall prevention program maintained   safety round/check completed     Problem: Adult Inpatient Plan of Care  Goal: Plan of Care Review  Outcome: Progressing  Goal: Patient-Specific Goal (Individualized)  Outcome: Progressing  Goal: Absence of Hospital-Acquired Illness or Injury  Outcome: Progressing  Intervention: Identify and Manage Fall Risk  Recent Flowsheet Documentation  Taken 6/26/2025 1230 by hSyanne Frost RN  Safety Promotion/Fall Prevention:   fall prevention program maintained   safety round/check completed  Taken 6/26/2025 1030 by Shyanne Frost RN  Safety Promotion/Fall Prevention:   fall prevention program maintained   safety round/check  completed  Taken 6/26/2025 0855 by Shyanne Frost RN  Safety Promotion/Fall Prevention:   fall prevention program maintained   safety round/check completed  Intervention: Prevent Skin Injury  Recent Flowsheet Documentation  Taken 6/26/2025 1230 by Shyanne Frost RN  Body Position: (up in chair with waffle cushion)   other (see comments)   patient/family refused  Taken 6/26/2025 1030 by Shyanne Frost RN  Body Position: (pt supine but getting up with physical therapy) other (see comments)  Taken 6/26/2025 0855 by Shyanne Frost RN  Body Position:   position changed independently   supine  Skin Protection: incontinence pads utilized  Intervention: Prevent and Manage VTE (Venous Thromboembolism) Risk  Recent Flowsheet Documentation  Taken 6/26/2025 0855 by Shyanne Frost RN  VTE Prevention/Management:   bilateral   SCDs (sequential compression devices) off  Intervention: Prevent Infection  Recent Flowsheet Documentation  Taken 6/26/2025 0855 by Shyanne Frost RN  Infection Prevention: single patient room provided  Goal: Optimal Comfort and Wellbeing  Outcome: Progressing  Intervention: Provide Person-Centered Care  Recent Flowsheet Documentation  Taken 6/26/2025 0855 by Shyanne Frost RN  Trust Relationship/Rapport:   care explained   choices provided  Goal: Readiness for Transition of Care  Outcome: Progressing     Problem: Sepsis/Septic Shock  Goal: Optimal Coping  Outcome: Progressing  Intervention: Support Patient and Family Response  Recent Flowsheet Documentation  Taken 6/26/2025 0855 by Shyanne Frost RN  Family/Support System Care: self-care encouraged  Goal: Absence of Bleeding  Outcome: Progressing  Goal: Blood Glucose Level Within Target Range  Outcome: Progressing  Intervention: Optimize Glycemic Control  Recent Flowsheet Documentation  Taken 6/26/2025 0855 by Shyanne Frost RN  Hypoglycemia Management: blood glucose monitored  Goal: Absence of Infection Signs and  Symptoms  Outcome: Progressing  Intervention: Initiate Sepsis Management  Recent Flowsheet Documentation  Taken 6/26/2025 0855 by Shyanne Frost, RN  Infection Prevention: single patient room provided  Intervention: Promote Recovery  Recent Flowsheet Documentation  Taken 6/26/2025 0855 by Shyanne Frost, RN  Activity Management: activity encouraged  Goal: Optimal Nutrition Delivery  Outcome: Progressing   Goal Outcome Evaluation:   Pt received scheduled antibiotics today. She has remained on 4L NC. Her right arm remains restricted. Her blood sugar was checked ACHS and sliding scale insulin given as indicated. Family has been at bedside. No current concerns and call light within reach.

## 2025-06-26 NOTE — THERAPY TREATMENT NOTE
"Subjective: Pt agreeable to therapeutic plan of care. Pt states she feels much better today.    Objective:     Precautions - falls, suppl O2    Bed mobility - SBA supine to sit EOB  Transfers - Min-A and with rolling walker STS from EOB  Ambulation - 10 feet CGA and with rolling walker    Vitals: WNL    Pain: 4 Gtz-Queen Location: ronak knees  Intervention for pain: Increased Activity and Therapeutic Presence    Education: Provided education on the importance of mobility in the acute care setting, Verbal/Tactile Cues, Transfer Training, Gait Training, and Energy conservation strategies    Assessment: Diane Guan presents with functional mobility impairments which indicate the need for skilled intervention. Pt reports feeling better today, has been up to the bathroom with staff. She is currently on 4L O2 at 94%, typically on 2L at home. Pt continues to demos poor endurance and activity tolerance, fatigues quickly with light activity and decreased gait speed. Discussed energy conservation strategies and to mobilize several times a day with staff to prevent mobility decline while IP. Tolerating session today without incident. Will continue to follow and progress as tolerated.     Plan/Recommendations:   If medically appropriate, Moderate Intensity Therapy recommended post-acute care. This is recommended as therapy feels the patient would require 3-4 days per week and wouldn't tolerate \"3 hour daily\" rehab intensity. SNF would be the preferred choice. If the patient does not agree to SNF, arrange HH or OP depending on home bound status. If patient is medically complex, consider LTACH. Pt requires no DME at discharge.     Pt desires Skilled Rehab placement at discharge. Pt cooperative; agreeable to therapeutic recommendations and plan of care.     Modified [place]: N/A = No pre-op stroke/TIA    Post-Tx Position: Up in Chair, Alarms activated, and Call light and personal items within reach  PPE: gloves    Therapy " Charges for Today       Code Description Service Date Service Provider Modifiers Qty    83469258020 HC PT THERAPEUTIC ACT EA 15 MIN 6/26/2025 Kita Garcia, PT GP 1    95565739602 HC GAIT TRAINING EA 15 MIN 6/26/2025 Kita Garcia, PT GP 1    [id number] HC PT SELF CARE/MGMT/TRAIN EA 15 MIN 6/26/2025 Kita Garcia, PT GP 1           PT Charges       Row Name 06/26/25 1118             Time Calculation    Start Time 1027  -      Stop Time 1053  -      Time Calculation (min) 26 min  -      PT Received On 06/26/25  -      PT - Next Appointment 06/27/25  -         Time Calculation- PT    Total Timed Code Minutes- PT 26 minute(s)  -                User Key  (r) = Recorded By, (t) = Taken By, (c) = Cosigned By      Initials Name Provider Type     Kita Garcia, PT Physical Therapist

## 2025-06-26 NOTE — DISCHARGE SUMMARY
"[facility]  Discharge Summary    Date of Service: 2025  Patient Name: Diane Guan  : 1940  MRN: [id number]    Date of Admission: 2025  Discharge Diagnosis: UTI (urinary tract infection)  Date of Discharge: 2025  Primary Care Physician: Asia Queen APRN      Presenting Problem:   UTI (urinary tract infection) [N39.0]  Thrombocytopenia [D69.6]  Right elbow pain [M25.521]  Frequent falls [R29.6]  Urinary tract infection with hematuria, site unspecified [N39.0, R31.9]  Anemia, unspecified type [D64.9]  Leukocytosis, unspecified type [D72.829]    Active and Resolved Hospital Problems:  Active Hospital Problems    Diagnosis POA    **UTI (urinary tract infection) [N39.0] Yes      Resolved Hospital Problems   No resolved problems to display.         Hospital Course     HPI:    \"Diane Guan is a 85 y.o. female with a CMH of CLL, CKD stage 3B, T2DM, HLD, HTN, Hypothyroidism, and hx of DVT on eliquis who presented to [facility] on 2025 with generalized weakness and increased falls x 1 week. Patient has been weak for many years, but she fell 3-4 times yesterday. She reports that she waited in the floor from 11:30 PM to about 6 AM because she was unable to get up. Pt does live alone. Patient has had decreased appetite. Patient denies head trauma, neck pain, LOC or isolated joint pain. She reports that she is experiencing generalized lower extremity pain bilaterally. Patient is on Eliquis. She reports generalized weakness, nausea, increased urinary frequency, rhinorrhea, and cough. She denies vomiting, diarrhea, constipation, headache, head trauma, changes in bowel habits, dysuria, fever, body aches, shortness of breath, and chest pain. Patient normally is a walker for ambulation at home. She is having some intermittent pain in her bilateral ribs. Patient denies saddle anesthesia and new incontinence.     Review of Systems 10 point review of " "systems neg except for above \"    Hospital Course:    Patient hospitalized with generalized weakness and falls. Found to have acute UTI with transient E. Coli bacteremia complicating. Placed on appropriate broad spectrum antibiotics before narrowing to culture driven antimicrobials with infectious disease assistance. Discharge on remained of 2 week course of IV ceftriaxone with midline in place and labs weekly for remained of antibiotic course.        DISCHARGE Follow Up Recommendations for labs and diagnostics:     PCP        Day of Discharge     Vital Signs:  Temp:  [97.8 °F (36.6 °C)-98.2 °F (36.8 °C)] 98.2 °F (36.8 °C)  Heart Rate:  [65-77] 65  Resp:  [16-28] 22  BP: (106-131)/(48-62) 128/60  Flow (L/min) (Oxygen Therapy):  [3-4] 3    Physical Exam:  Physical Exam  Constitutional:       Appearance: Normal appearance.   Cardiovascular:      Rate and Rhythm: Normal rate and regular rhythm.      Pulses: Normal pulses.      Heart sounds: Normal heart sounds.   Pulmonary:      Effort: Pulmonary effort is normal.      Breath sounds: Normal breath sounds.   Abdominal:      General: Abdomen is flat.   Neurological:      Mental Status: She is alert.           Pertinent  and/or Most Recent Results     LAB RESULTS:      Lab 06/26/25  0518 06/25/25  0436 06/24/25  1137 06/24/25  1133 06/24/25  1012   WBC 13.23* 22.96*  --   --  24.40*   HEMOGLOBIN 12.4 9.7*  --   --  10.0*   HEMATOCRIT 40.9 34.0  --   --  33.1*   PLATELETS 86* 106*  --   --  115*   NEUTROS ABS 3.70 5.74  --   --  7.81*   MCV 90.1 94.7  --   --  91.4   LACTATE  --   --  0.3  --   --    PROTIME  --   --   --  15.9*  --    APTT  --   --   --  29.9  --          Lab 06/26/25  0518 06/25/25  1120 06/25/25  0436 06/24/25  1012   SODIUM 135*  --  135* 133*   POTASSIUM 3.7  --  3.8 4.1   CHLORIDE 105  --  105 103   CO2 19.6*  --  16.8* 15.2*   ANION GAP 10.4  --  13.2 14.8   BUN 31.5*  --  30.2* 34.1*   CREATININE 1.42*  --  1.39* 1.51*   EGFR 36.5*  --  37.5* 33.9* "   GLUCOSE 129*  --  134* 130*   CALCIUM 8.0*  --  8.1* 8.4*   MAGNESIUM 3.2*  --  1.3* 1.2*   PHOSPHORUS 3.3 2.3* 2.6 2.9   HEMOGLOBIN A1C  --   --   --  6.11*   TSH  --   --   --  4.260*         Lab 06/24/25  1012   TOTAL PROTEIN 6.1   ALBUMIN 3.6   GLOBULIN 2.5   ALT (SGPT) 19   AST (SGOT) 35*   BILIRUBIN 0.6   ALK PHOS 84   LIPASE 12*         Lab 06/24/25  1133 06/24/25  1012   PROBNP  --  995.0   HSTROP T 37* 38*   PROTIME 15.9*  --    INR 1.27*  --              Lab 06/25/25  0436 06/25/25  0024 06/24/25  1628 06/24/25  1133   FERRITIN  --   --   --  1,037.00*   FOLATE  --  11.30  --   --    VITAMIN B 12  --   --  381  --    ABO TYPING A  --   --   --    RH TYPING Positive  --   --   --    ANTIBODY SCREEN Negative  --   --   --          Brief Urine Lab Results  (Last result in the past 365 days)        Color   Clarity   Blood   Leuk Est   Nitrite   Protein   CREAT   Urine HCG        06/24/25 1049 Dark Yellow   Cloudy   Small (1+)   Moderate (2+)   Negative   30 mg/dL (1+)                 Microbiology Results (last 10 days)       Procedure Component Value - Date/Time    Blood Culture - Blood, Arm, Left [[id number]]  (Abnormal) Collected: 06/24/25 1133    Lab Status: Preliminary result Specimen: Blood from Arm, Left Updated: 06/26/25 0628     Blood Culture Escherichia coli     Isolated from Anaerobic Bottle     Gram Stain Anaerobic Bottle Gram negative bacilli    Blood Culture ID, PCR - Blood, Arm, Left [[id number]]  (Abnormal) Collected: 06/24/25 1133    Lab Status: Final result Specimen: Blood from Arm, Left Updated: 06/25/25 0309     BCID, PCR Escherichia coli. Identification by BCID2 PCR.     BOTTLE TYPE Anaerobic Bottle    Narrative:      No resistance genes detected.    Urine Culture - Urine, Urine, Clean Catch [[id number]]  (Abnormal)  (Susceptibility) Collected: 06/24/25 1049    Lab Status: Final result Specimen: Urine, Clean Catch Updated: 06/26/25 1113     Urine Culture >100,000 CFU/mL Escherichia coli     Narrative:      Colonization of the urinary tract without infection is common. Treatment is discouraged unless the patient is symptomatic, pregnant, or undergoing an invasive urologic procedure.    Susceptibility        Escherichia coli      JUAN      Amoxicillin + Clavulanate Susceptible      Ampicillin Susceptible      Ampicillin + Sulbactam Susceptible      Cefazolin (Urine) Susceptible      Cefepime Susceptible      Ceftazidime Susceptible      Ceftriaxone Susceptible      Cefuroxime axetil Susceptible      Gentamicin Susceptible      Levofloxacin Susceptible      Nitrofurantoin Susceptible      Piperacillin + Tazobactam Susceptible      Trimethoprim + Sulfamethoxazole Susceptible                           Respiratory Panel PCR w/COVID-19(SARS-CoV-2) LESLEE/VIVIANE/PAVAN/PAD/COR/JUAN DAVID In-House, NP Swab in UTM/VTM, 2 HR TAT - Swab, Nasopharynx [[id number]]  (Normal) Collected: 06/24/25 1012    Lab Status: Final result Specimen: Swab from Nasopharynx Updated: 06/24/25 1119     ADENOVIRUS, PCR Not Detected     Coronavirus 229E Not Detected     Coronavirus HKU1 Not Detected     Coronavirus NL63 Not Detected     Coronavirus OC43 Not Detected     COVID19 Not Detected     Human Metapneumovirus Not Detected     Human Rhinovirus/Enterovirus Not Detected     Influenza A PCR Not Detected     Influenza B PCR Not Detected     Parainfluenza Virus 1 Not Detected     Parainfluenza Virus 2 Not Detected     Parainfluenza Virus 3 Not Detected     Parainfluenza Virus 4 Not Detected     RSV, PCR Not Detected     Bordetella pertussis pcr Not Detected     Bordetella parapertussis PCR Not Detected     Chlamydophila pneumoniae PCR Not Detected     Mycoplasma pneumo by PCR Not Detected    Narrative:      In the setting of a positive respiratory panel with a viral infection PLUS a negative procalcitonin without other underlying concern for bacterial infection, consider observing off antibiotics or discontinuation of antibiotics and continue supportive  care. If the respiratory panel is positive for atypical bacterial infection (Bordetella pertussis, Chlamydophila pneumoniae, or Mycoplasma pneumoniae), consider antibiotic de-escalation to target atypical bacterial infection.    Blood Culture - Blood, Arm, Left [[id number]]  (Normal) Collected: 06/24/25 1012    Lab Status: Preliminary result Specimen: Blood from Arm, Left Updated: 06/26/25 1030     Blood Culture No growth at 2 days    Narrative:      Less than seven (7) mL's of blood was collected.  Insufficient quantity may yield false negative results.            CT Abdomen Pelvis Stone Protocol  Result Date: 6/25/2025  Impression: Impression: 1. 3 mm nonobstructing stone in the left lower renal pole. No ureteral stone. No hydronephrosis. 2. Small quantity air is seen in the urinary bladder lumen. Correlate for recent catheterization. 3. Uncomplicated colonic diverticulosis. 4. Additional chronic findings as described above. Electronically Signed: Marta Carrillo MD  6/25/2025 4:05 PM EDT  Workstation ID: [id number]    XR Knee 1 or 2 View Right  Result Date: 6/25/2025  Impression: Impression: 1. Negative for fracture. 2. Moderate osteoarthritis most pronounced in the medial compartment. 3. Small joint effusion. Electronically Signed: Pavan Amezquita MD  6/25/2025 3:37 PM EDT  Workstation ID: [id number]    US Renal Bilateral  Result Date: 6/25/2025  Impression: Impression: 1.No hydronephrosis or shadowing calculus. 2.Small bilateral renal cysts. Otherwise normal sonographic appearance of the kidneys. 3.Urinary bladder appears thick walled, which may be accentuated by nondistention. Correlate clinically for cystitis. Electronically Signed: David Chester MD  6/25/2025 10:31 AM EDT  Workstation ID: [id number]    XR Chest 1 View  Result Date: 6/24/2025  Impression: Impression: Chest: No acute process. Right elbow: Negative for fracture. Electronically Signed: Pavan Amezquita MD  6/24/2025 10:37 AM EDT  Workstation ID: [id number]    XR  Elbow 3+ View Right  Result Date: 6/24/2025  Impression: Impression: Chest: No acute process. Right elbow: Negative for fracture. Electronically Signed: Pavan Amezquita MD  6/24/2025 10:37 AM EDT  Workstation ID: [id number]      Results for orders placed during the hospital encounter of 07/05/24    Duplex Venous Lower Extremity - Bilateral CAR    Interpretation Summary    Acute left lower extremity deep vein thrombosis noted in the gastrocnemius.    All other veins appeared normal bilaterally.      Results for orders placed during the hospital encounter of 07/05/24    Duplex Venous Lower Extremity - Bilateral CAR    Interpretation Summary    Acute left lower extremity deep vein thrombosis noted in the gastrocnemius.    All other veins appeared normal bilaterally.      Results for orders placed during the hospital encounter of 07/05/24    Adult Transthoracic Echo Complete W/ Cont if Necessary Per Protocol    Interpretation Summary    Left ventricular systolic function is normal. Calculated left ventricular EF = 62%    Left ventricular wall thickness is consistent with mild concentric hypertrophy.    Left ventricular diastolic function is consistent with (grade Ia w/high LAP) impaired relaxation.    The left atrial cavity is mild to moderately dilated.    There is mild calcification of the aortic valve.    Transthoracic echocardiography reveals mild LVH with EF of 62%.  Diastolic dysfunction criteria noted.  Mild to moderate left atrial enlargement.  Mild AI and no effusion.    Electronically signed by Lucho Rodriguez MD, 07/06/24, 3:40 PM EDT.      Labs Pending at Discharge:  Pending Results       None            Procedures Performed           Consults:   Consults       Date and Time Order Name Status Description    6/25/2025  8:10 AM Inpatient Infectious Diseases Consult Completed     6/24/2025 12:12 PM Hospitalist (on-call MD unless specified)                Discharge Details        Discharge Medications        New  Medications        Instructions Start Date   cefTRIAXone 2,000 mg in sodium chloride 0.9 % 100 mL IVPB   2,000 mg, Intravenous, Every 24 Hours   Start Date: June 27, 2025            Continue These Medications        Instructions Start Date   amLODIPine 10 MG tablet  Commonly known as: NORVASC   10 mg, Oral, Daily, for high blood pressure      apixaban 2.5 MG tablet tablet  Commonly known as: ELIQUIS   2.5 mg, Oral, 2 Times Daily      chlorthalidone 25 MG tablet  Commonly known as: HYGROTON   25 mg, Daily      docusate sodium 100 MG capsule  Commonly known as: COLACE   1 capsule, 2 Times Daily      ferrous sulfate 324 (65 Fe) MG tablet delayed-release EC tablet   1 tablet, 3 Times Weekly      irbesartan 300 MG tablet  Commonly known as: AVAPRO   300 mg, Nightly      levothyroxine 100 MCG tablet  Commonly known as: SYNTHROID, LEVOTHROID   TAKE 1 TABLET BY MOUTH EVERY DAY      metFORMIN 500 MG tablet  Commonly known as: GLUCOPHAGE   500 mg, Oral, Daily With Breakfast      metoclopramide 5 MG tablet  Commonly known as: REGLAN   5 mg, Oral, 3 Times Daily PRN      mirtazapine 15 MG tablet  Commonly known as: REMERON   15 mg, Oral, Every Night at Bedtime      pantoprazole 40 MG EC tablet  Commonly known as: PROTONIX   40 mg, Daily      rOPINIRole 0.25 MG tablet  Commonly known as: REQUIP   0.25 mg, Nightly      rosuvastatin 10 MG tablet  Commonly known as: CRESTOR   10 mg, Oral, Every Night at Bedtime      sertraline 100 MG tablet  Commonly known as: ZOLOFT   150 mg, Oral, Daily               No Known Allergies      Discharge Disposition:   Skilled Nursing Facility (DC - External)    Diet:  Hospital:  Diet Order   Procedures    Diet: Diabetic; Consistent Carbohydrate; Fluid Consistency: Thin (IDDSI 0)         Discharge Activity:         CODE STATUS:  Code Status and Medical Interventions: CPR (Attempt to Resuscitate); Full Support   Ordered at: 06/24/25 7577     Code Status (Patient has no pulse and is not breathing):     CPR (Attempt to Resuscitate)     Medical Interventions (Patient has pulse or is breathing):    Full Support     Level Of Support Discussed With:    Patient         Future Appointments   Date Time Provider Department Center   9/12/2025  2:45 PM Asia Queen APRN [facility]   9/17/2025  1:00 PM NURSE/MA PC [facility]   9/25/2025  2:15 PM Gregory Rivers MD [facility]       Additional Instructions for the Follow-ups that You Need to Schedule       Basic Metabolic Panel  (Once a week)  Sep 26, 2026      Please fax all post discharge lab results, imaging studies and correspondence to this fax number [phone]  For any question or concern please contact our service number [phone]    Order Comments: Please fax all post discharge lab results, imaging studies and correspondence to this fax number [phone] For any question or concern please contact our service number [phone]    Release to patient: Routine Release        CBC (No Diff)  (Once a week)  Sep 26, 2026      Please fax all post discharge lab results, imaging studies and correspondence to this fax number [phone]  For any question or concern please contact our service number [phone]    Order Comments: Please fax all post discharge lab results, imaging studies and correspondence to this fax number [phone] For any question or concern please contact our service number [phone]    Release to patient: Routine Release                Time spent on Discharge including face to face service:  56 minutes    Signature: Electronically signed by Jeremiah Null MD, 06/26/25, 16:20 EDT.  [facility] Hospitalist Team

## 2025-06-26 NOTE — THERAPY TREATMENT NOTE
"Subjective: Pt agreeable to therapeutic plan of care.Dtr present in room to observe tx.   Cognition: oriented to Person, Place, Time, and Situation    Objective:     Precautions - fall risk    Bed Mobility: sup>sit= Min-A; sit>supine = CGA.   Functional Transfers: CGA with RW & v.c. for hand placement.      Balance: sitting EOB Supervision, standing = CGA. Standing tolerance = fair- for safe ADL performance.   Functional Ambulation: N/A or Not attempted.    Lower Body Dressing: Min-A  ADL Position: edge of bed sitting  ADL Comments:     Therapeutic Exercise - 10 Reps B LE AROM unsupported sitting / EOB & standing  to inc AROM & strength required for LB ADLs & ADL transfers.      Vitals: WFL    Pain: 7 Gtz-Baker Location: BLE  Interventions for pain: Repositioned, Increased Activity, and Therapeutic Presence  Education: Provided education on the importance of mobility in the acute care setting, Verbal/Tactile Cues, ADL training, and Transfer Training    Assessment: Diane Guan presents with ADL impairments affecting function including balance, endurance / activity tolerance, pain, range of motion (ROM), and strength. Pt progressing steadily with improving ADL transfers, bed mobility & LB dressing this date. Demonstrated functioning below baseline abilities indicate the need for continued skilled intervention while inpatient. Tolerating session today without incident. Will continue to follow and progress as tolerated.     Plan/Recommendations:   Moderate Intensity Therapy recommended post-acute care. This is recommended as therapy feels the patient would require 3-4 days per week and wouldn't tolerate \"3 hour daily\" rehab intensity. SNF would be the preferred choice. If the patient does not agree to SNF, arrange HH or OP depending on home bound status. If patient is medically complex, consider LTACH.. Pt requires no DME at discharge.     Pt desires Skilled Rehab placement at discharge. Pt cooperative; agreeable " to therapeutic recommendations and plan of care.     Modified [place]: N/A = No pre-op stroke/TIA    Post-Tx Position: Supine with HOB Elevated, Alarms activated, and Call light and personal items within reach  PPE: gloves       Time Calculation- OT       Row Name 06/26/25 1528             Time Calculation- OT    OT Start Time 1012  -DT      OT Stop Time 1040  -DT      OT Time Calculation (min) 28 min  -DT      Total Timed Code Minutes- OT 28 minute(s)  -DT      OT Received On 06/26/25  -DT      OT - Next Appointment 06/27/25  -DT                User Key  (r) = Recorded By, (t) = Taken By, (c) = Cosigned By      Initials Name Provider Type    Anita Stanford, OT Occupational Therapist

## 2025-06-26 NOTE — PLAN OF CARE
"Goal Outcome Evaluation:      Assessment: Diane Guan presents with ADL impairments affecting function including balance, endurance / activity tolerance, pain, range of motion (ROM), and strength. Pt progressing steadily with improving ADL transfers, bed mobility & LB dressing this date. Demonstrated functioning below baseline abilities indicate the need for continued skilled intervention while inpatient. Tolerating session today without incident. Will continue to follow and progress as tolerated.     Plan/Recommendations:   Moderate Intensity Therapy recommended post-acute care. This is recommended as therapy feels the patient would require 3-4 days per week and wouldn't tolerate \"3 hour daily\" rehab intensity. SNF would be the preferred choice. If the patient does not agree to SNF, arrange HH or OP depending on home bound status. If patient is medically complex, consider LTACH.. Pt requires no DME at discharge.     Pt desires Skilled Rehab placement at discharge. Pt cooperative; agreeable to therapeutic recommendations and plan of care.                                       "

## 2025-06-26 NOTE — SIGNIFICANT NOTE
Case Management/Social Work    Patient Name:  Diane Guan  YOB: 1940  MRN: [id number]  Admit Date:  6/24/2025 06/26/25 1534   Post Acute Pre-Cert Documentation   Date Post Acute Pre-Cert Completed 06/26/25   All Clinicals Submitted? Yes   Response Received from Insurance? Approval   Post Acute Pre-Cert Initiated Comment CME verified SNF precert approval via [facility] portal. Portal auth ID [id number], plan auth ID [id number]. Valid 6/26-6/30. CM made aware.           Electronically signed by:  ALEKSANDER Dunbar  06/26/25 15:34 EDT    Kunal Pimentel  Case Management Extender  [address] [address]  P: [phone]  F: [phone]

## 2025-06-26 NOTE — PROGRESS NOTES
[facility] MEDICINE SERVICE  DAILY PROGRESS NOTE    NAME: Diane Guan  : 1940  MRN: [id number]      LOS: 2 days     PROVIDER OF SERVICE: Jeremiah Null MD    Chief Complaint: UTI (urinary tract infection)    Subjective:     Interval History:  History taken from: patient    Improving rapidly.      Review of Systems:   Review of Systems    Objective:     Vital Signs  Temp:  [97.7 °F (36.5 °C)-98.2 °F (36.8 °C)] 97.9 °F (36.6 °C)  Heart Rate:  [70-77] 70  Resp:  [16-28] 21  BP: (106-136)/(48-62) 106/51  Flow (L/min) (Oxygen Therapy):  [4] 4   Body mass index is 28.7 kg/m².    Physical Exam  Physical Exam  HENT:      Head: Atraumatic.      Mouth/Throat:      Mouth: Mucous membranes are moist.   Eyes:      Pupils: Pupils are equal, round, and reactive to light.   Cardiovascular:      Rate and Rhythm: Normal rate and regular rhythm.   Pulmonary:      Effort: Pulmonary effort is normal.      Breath sounds: Normal breath sounds.   Abdominal:      General: Bowel sounds are normal.      Palpations: Abdomen is soft.   Musculoskeletal:         General: Tenderness present. Normal range of motion.      Cervical back: Neck supple.      Comments: Rt knee    Neurological:      General: No focal deficit present.      Mental Status: She is alert and oriented to person, place, and time.   Psychiatric:         Mood and Affect: Mood normal.            Diagnostic Data    Results from last 7 days   Lab Units 25  0518 25  0436 25  1012   WBC 10*3/mm3 13.23*   < > 24.40*   HEMOGLOBIN g/dL 12.4   < > 10.0*   HEMATOCRIT % 40.9   < > 33.1*   PLATELETS 10*3/mm3 86*   < > 115*   GLUCOSE mg/dL 129*   < > 130*   CREATININE mg/dL 1.42*   < > 1.51*   BUN mg/dL 31.5*   < > 34.1*   SODIUM mmol/L 135*   < > 133*   POTASSIUM mmol/L 3.7   < > 4.1   AST (SGOT) U/L  --   --  35*   ALT (SGPT) U/L  --   --  19   ALK PHOS U/L  --   --  84   BILIRUBIN mg/dL  --   --  0.6   ANION GAP mmol/L 10.4   < > 14.8    < > = values  in this interval not displayed.       CT Abdomen Pelvis Stone Protocol  Result Date: 6/25/2025  Impression: 1. 3 mm nonobstructing stone in the left lower renal pole. No ureteral stone. No hydronephrosis. 2. Small quantity air is seen in the urinary bladder lumen. Correlate for recent catheterization. 3. Uncomplicated colonic diverticulosis. 4. Additional chronic findings as described above. Electronically Signed: Marta Carrillo MD  6/25/2025 4:05 PM EDT  Workstation ID: [id number]    XR Knee 1 or 2 View Right  Result Date: 6/25/2025  Impression: 1. Negative for fracture. 2. Moderate osteoarthritis most pronounced in the medial compartment. 3. Small joint effusion. Electronically Signed: Pavan Amezquita MD  6/25/2025 3:37 PM EDT  Workstation ID: [id number]    US Renal Bilateral  Result Date: 6/25/2025  Impression: 1.No hydronephrosis or shadowing calculus. 2.Small bilateral renal cysts. Otherwise normal sonographic appearance of the kidneys. 3.Urinary bladder appears thick walled, which may be accentuated by nondistention. Correlate clinically for cystitis. Electronically Signed: David Chester MD  6/25/2025 10:31 AM EDT  Workstation ID: [id number]        I reviewed the patient's new clinical results.    Assessment/Plan:     Active and Resolved Problems  Active Hospital Problems    Diagnosis  POA    **UTI (urinary tract infection) [N39.0]  Yes      Resolved Hospital Problems   No resolved problems to display.   Diane Guan is a 85 y.o. female with a CMH of CLL, CKD stage 3B, T2DM, HLD, HTN, Hypothyroidism, and hx of DVT on eliquis who presented to [facility] on 6/24/2025 with generalized weakness and increased falls x 1 week     Assessment and plan  E. coli bacteremia  UTI with cystitis  - Presented with dark urine with increased frequency  - Urinalysis consistent with UTI  -- Urine culture with same organism  -Blood culture grew E. coli pending sensitivity. Will need 2 weeks antibiotics.  - Patient started on  ceftriaxone we will continue to monitor side effects  - Similar presentation on 03/2025 found to have UTI with culture Klebsiella sensitive to ceftriaxone  - Renal ultrasound revealed. No hydro    CLL: f/u with hematology outpatient  - WBC 24.40, hemoglobin 10, platelets 115, lab abnormalities are consistent with previous labs  -follows with Dr. Bolden  -has not required any treatment since her diagnosis  -receives Venofer infusions outpatient    Rhabdomyolysis-mild  Continue gentle IV fluid hydration    Recurrent falls  -Right knee pain will x-ray  -Continue pain control  -PT/OT     CKD stage 3B: Creatinine 1.51 on arrival, estimated GFR 33.9.  Near baseline. Mild bump in creatinine  - s/p 1 L IVF  -Continue D5LR for starvation ketosis  -Avoid nephrotoxic medication and contrast  -Avoid hypotension  -Hold home Chlorthalidone, Irbesartan     T2DM: A1c pending  - Holding home antihyperglycemics  - Low-dose sliding scale insulin  - Hypoglycemia protocol ordered     HLD: Continue home statin     HTN: Patient has remained normotensive in the ER  - Continue home amlodipine     Hypothyroidism: TSH mildly elevated at 4.260, free T4 within normal limits  - Continue home Synthroid     hx of DVT and PE on eliquis: Continue home Eliquis  - diagnosed July 2024, dose reduced to 2.5mg BID        VTE Prophylaxis:  Pharmacologic VTE prophylaxis orders are present.             Disposition Planning:     Barriers to Discharge: E. coli bacteremia awaiting final urine and blood culture results  Anticipated Date of Discharge: 6/28  Place of Discharge: D      Time: 35 minutes     Code Status and Medical Interventions: CPR (Attempt to Resuscitate); Full Support   Ordered at: 06/24/25 1252     Code Status (Patient has no pulse and is not breathing):    CPR (Attempt to Resuscitate)     Medical Interventions (Patient has pulse or is breathing):    Full Support     Level Of Support Discussed With:    Patient       Signature: Electronically  signed by Jeremiah Null MD, 06/26/25, 11:52 EDT.  [facility] Hospitalist Team

## 2025-06-26 NOTE — CASE MANAGEMENT/SOCIAL WORK
Continued Stay Note   Hoang     Patient Name: Diane Guan  MRN: [id number]  Today's Date: 6/26/2025    Admit Date: 6/24/2025    Plan: [facility] (accepting/pharmacy updated.) PASRR approved. Precert requested 6/26   Discharge Plan       Row Name 06/26/25 1358       Plan    Plan [facility] (accepting/pharmacy updated.) PASRR approved. Veritoert requested 6/26                     Polly Estrada RN     Office phone: [phone]  Office fax: [phone]

## 2025-06-26 NOTE — PLAN OF CARE
Problem: Skin Injury Risk Increased  Goal: Skin Health and Integrity  Outcome: Progressing  Intervention: Optimize Skin Protection  Recent Flowsheet Documentation  Taken 6/25/2025 2034 by Tessie Mehta RN  Pressure Reduction Techniques: frequent weight shift encouraged  Pressure Reduction Devices: pressure-redistributing mattress utilized  Skin Protection:   transparent dressing maintained   incontinence pads utilized     Problem: Fall Injury Risk  Goal: Absence of Fall and Fall-Related Injury  Outcome: Progressing  Intervention: Identify and Manage Contributors  Recent Flowsheet Documentation  Taken 6/26/2025 0201 by Tessie Mehta RN  Medication Review/Management: medications reviewed  Taken 6/26/2025 0016 by Tessie Mehta RN  Medication Review/Management: medications reviewed  Taken 6/25/2025 2218 by Tessie Mehta RN  Medication Review/Management: medications reviewed  Taken 6/25/2025 2034 by Tessie Mehta RN  Medication Review/Management: medications reviewed  Intervention: Promote Injury-Free Environment  Recent Flowsheet Documentation  Taken 6/26/2025 0201 by Tessie Mehta RN  Safety Promotion/Fall Prevention: safety round/check completed  Taken 6/26/2025 0016 by Tessie Mehta RN  Safety Promotion/Fall Prevention: safety round/check completed  Taken 6/25/2025 2218 by Tessie Mehta RN  Safety Promotion/Fall Prevention: safety round/check completed  Taken 6/25/2025 2034 by Tessie Mehta RN  Safety Promotion/Fall Prevention: safety round/check completed  Goal: Patient-Specific Goal (Individualized)  Outcome: Progressing  Goal: Absence of Hospital-Acquired Illness or Injury  Outcome: Progressing  Intervention: Identify and Manage Fall Risk  Recent Flowsheet Documentation  Taken 6/26/2025 0201 by Tessie Mehta RN  Safety Promotion/Fall Prevention: safety round/check completed  Taken 6/26/2025 0016 by Tessie Mehta RN  Safety Promotion/Fall Prevention: safety round/check completed  Taken  6/25/2025 2218 by Tessie Mehta RN  Safety Promotion/Fall Prevention: safety round/check completed  Taken 6/25/2025 2034 by Tessie Mehta RN  Safety Promotion/Fall Prevention: safety round/check completed  Intervention: Prevent Skin Injury  Recent Flowsheet Documentation  Taken 6/25/2025 2034 by Tessie Mehta RN  Skin Protection:   transparent dressing maintained   incontinence pads utilized  Intervention: Prevent and Manage VTE (Venous Thromboembolism) Risk  Recent Flowsheet Documentation  Taken 6/25/2025 2034 by Tessie Mehta RN  VTE Prevention/Management: SCDs (sequential compression devices) off  Goal: Optimal Comfort and Wellbeing  Outcome: Progressing  Intervention: Provide Person-Centered Care  Recent Flowsheet Documentation  Taken 6/25/2025 2034 by Tessie Mehta RN  Trust Relationship/Rapport:   care explained   choices provided  Goal: Readiness for Transition of Care  Outcome: Progressing   Goal Outcome Evaluation:  Plan of Care Reviewed With: patient        Progress: no change    Pt resting in bed with call light within reach. Bed alarm active and audible. Family at bedside, supporting pt. No complaints at this time.

## 2025-06-26 NOTE — PLAN OF CARE
Problem: Skin Injury Risk Increased  Goal: Skin Health and Integrity  6/26/2025 1732 by Shyanne Frost RN  Outcome: Met  6/26/2025 1337 by Shyanne Frost RN  Outcome: Progressing  Intervention: Optimize Skin Protection  Recent Flowsheet Documentation  Taken 6/26/2025 0855 by Shyanne Frost RN  Activity Management: activity encouraged  Pressure Reduction Techniques: frequent weight shift encouraged  Head of Bed (HOB) Positioning: HOB elevated  Pressure Reduction Devices: pressure-redistributing mattress utilized  Skin Protection: incontinence pads utilized     Problem: Fall Injury Risk  Goal: Absence of Fall and Fall-Related Injury  6/26/2025 1732 by Shyanne Frost RN  Outcome: Met  6/26/2025 1337 by Shyanne Frost RN  Outcome: Progressing  Intervention: Identify and Manage Contributors  Recent Flowsheet Documentation  Taken 6/26/2025 0855 by Shyanne Frost RN  Medication Review/Management: medications reviewed  Self-Care Promotion: independence encouraged  Intervention: Promote Injury-Free Environment  Recent Flowsheet Documentation  Taken 6/26/2025 1700 by Shyanne Frost RN  Safety Promotion/Fall Prevention:   fall prevention program maintained   safety round/check completed  Taken 6/26/2025 1630 by Shyanne Frost RN  Safety Promotion/Fall Prevention:   fall prevention program maintained   safety round/check completed  Taken 6/26/2025 1417 by Shyanne Frost RN  Safety Promotion/Fall Prevention:   fall prevention program maintained   safety round/check completed  Taken 6/26/2025 1230 by Shyanne Frost RN  Safety Promotion/Fall Prevention:   fall prevention program maintained   safety round/check completed  Taken 6/26/2025 1030 by Shyanne Frost RN  Safety Promotion/Fall Prevention:   fall prevention program maintained   safety round/check completed  Taken 6/26/2025 0855 by Shyanne Frost RN  Safety Promotion/Fall Prevention:   fall prevention program maintained    safety round/check completed     Problem: Adult Inpatient Plan of Care  Goal: Plan of Care Review  6/26/2025 1732 by Shyanne Frost RN  Outcome: Met  6/26/2025 1337 by Shyanne Frost RN  Outcome: Progressing  Goal: Patient-Specific Goal (Individualized)  6/26/2025 1732 by Shyanne Frost RN  Outcome: Met  6/26/2025 1337 by Shyanne Frost RN  Outcome: Progressing  Goal: Absence of Hospital-Acquired Illness or Injury  6/26/2025 1732 by Shyanne Frost RN  Outcome: Met  6/26/2025 1337 by Shyanne Frost RN  Outcome: Progressing  Intervention: Identify and Manage Fall Risk  Recent Flowsheet Documentation  Taken 6/26/2025 1700 by Shyanne Frost RN  Safety Promotion/Fall Prevention:   fall prevention program maintained   safety round/check completed  Taken 6/26/2025 1630 by Shyanne Frost RN  Safety Promotion/Fall Prevention:   fall prevention program maintained   safety round/check completed  Taken 6/26/2025 1417 by Shyanne Frost RN  Safety Promotion/Fall Prevention:   fall prevention program maintained   safety round/check completed  Taken 6/26/2025 1230 by Shyanne Frost RN  Safety Promotion/Fall Prevention:   fall prevention program maintained   safety round/check completed  Taken 6/26/2025 1030 by Shyanne Frost RN  Safety Promotion/Fall Prevention:   fall prevention program maintained   safety round/check completed  Taken 6/26/2025 0855 by Shyanne Frost RN  Safety Promotion/Fall Prevention:   fall prevention program maintained   safety round/check completed  Intervention: Prevent Skin Injury  Recent Flowsheet Documentation  Taken 6/26/2025 1630 by Shyanne Frost RN  Body Position: patient/family refused  Taken 6/26/2025 1417 by Shyanne Frost RN  Body Position: supine  Taken 6/26/2025 1230 by Shyanne Frost RN  Body Position: (up in chair with waffle cushion)   other (see comments)   patient/family refused  Taken 6/26/2025 1030 by Shyanne Frost RN  Body  Position: (pt supine but getting up with physical therapy) other (see comments)  Taken 6/26/2025 0855 by Shyanne Frost RN  Body Position:   position changed independently   supine  Skin Protection: incontinence pads utilized  Intervention: Prevent and Manage VTE (Venous Thromboembolism) Risk  Recent Flowsheet Documentation  Taken 6/26/2025 0855 by Shyanne Frost RN  VTE Prevention/Management:   bilateral   SCDs (sequential compression devices) off  Intervention: Prevent Infection  Recent Flowsheet Documentation  Taken 6/26/2025 0855 by Shyanne Frost RN  Infection Prevention: single patient room provided  Goal: Optimal Comfort and Wellbeing  6/26/2025 1732 by Shyanne Frost RN  Outcome: Met  6/26/2025 1337 by Shyanne Frost RN  Outcome: Progressing  Intervention: Monitor Pain and Promote Comfort  Recent Flowsheet Documentation  Taken 6/26/2025 1348 by Shyanne Frost RN  Pain Management Interventions: pain medication given  Intervention: Provide Person-Centered Care  Recent Flowsheet Documentation  Taken 6/26/2025 0855 by Shyanne Frost RN  Trust Relationship/Rapport:   care explained   choices provided  Goal: Readiness for Transition of Care  6/26/2025 1732 by Shyanne Frost RN  Outcome: Met  6/26/2025 1337 by Shyanne Frost RN  Outcome: Progressing     Problem: Sepsis/Septic Shock  Goal: Optimal Coping  6/26/2025 1732 by Shyanne Frost RN  Outcome: Met  6/26/2025 1337 by Shyanne Frost RN  Outcome: Progressing  Intervention: Support Patient and Family Response  Recent Flowsheet Documentation  Taken 6/26/2025 0855 by Shyanne Frost RN  Family/Support System Care: self-care encouraged  Goal: Absence of Bleeding  6/26/2025 1732 by Shyanne Frost RN  Outcome: Met  6/26/2025 1337 by Shyanne Frost RN  Outcome: Progressing  Goal: Blood Glucose Level Within Target Range  6/26/2025 1732 by Shyanne Frost RN  Outcome: Met  6/26/2025 1337 by Shyanne Frost  RN  Outcome: Progressing  Intervention: Optimize Glycemic Control  Recent Flowsheet Documentation  Taken 6/26/2025 0855 by Shyanne Frost RN  Hypoglycemia Management: blood glucose monitored  Goal: Absence of Infection Signs and Symptoms  6/26/2025 1732 by Shyanne Frost RN  Outcome: Met  6/26/2025 1337 by Shyanne Frost RN  Outcome: Progressing  Intervention: Initiate Sepsis Management  Recent Flowsheet Documentation  Taken 6/26/2025 0855 by Shyanne Frost RN  Infection Prevention: single patient room provided  Intervention: Promote Recovery  Recent Flowsheet Documentation  Taken 6/26/2025 0855 by Shyanne Frost RN  Activity Management: activity encouraged  Goal: Optimal Nutrition Delivery  6/26/2025 1732 by Shyanne Frost RN  Outcome: Met  6/26/2025 1337 by Shyanne Frost RN  Outcome: Progressing   Goal Outcome Evaluation:      Pt to discharge to [facility], transporting via family. Discharge paperwork discussed with pt and daughter and sent with patient to facility. Other facility paperwork also sent with patient. Midline flushed and had blood return prior to discharge and will remain in for IV antibiotics after discharge. Report called to nurse, Kari, at [facility]. No concerns noted.

## 2025-06-26 NOTE — PLAN OF CARE
"Assessment: Diane Guan presents with functional mobility impairments which indicate the need for skilled intervention. Pt reports feeling better today, has been up to the bathroom with staff. She is currently on 4L O2 at 94%, typically on 2L at home. Pt continues to demos poor endurance and activity tolerance, fatigues quickly with light activity and decreased gait speed. Discussed energy conservation strategies and to mobilize several times a day with staff to prevent mobility decline while IP. Tolerating session today without incident. Will continue to follow and progress as tolerated.     Plan/Recommendations:   If medically appropriate, Moderate Intensity Therapy recommended post-acute care. This is recommended as therapy feels the patient would require 3-4 days per week and wouldn't tolerate \"3 hour daily\" rehab intensity. SNF would be the preferred choice. If the patient does not agree to SNF, arrange HH or OP depending on home bound status. If patient is medically complex, consider LTACH. Pt requires no DME at discharge.   "

## 2025-06-26 NOTE — PROGRESS NOTES
Infectious Diseases Progress Note      LOS: 2 days   Patient Care Team:  Asia Queen APRN as PCP - General (Nurse Practitioner)  Asia Queen APRN as Nurse Practitioner (Nurse Practitioner)  Gregory Rivers MD as Consulting Physician (Hematology and Oncology)    Chief Complaint: Weakness and falls at admission    Subjective       The patient has been afebrile for the last 24 hours.  The patient is on room air, hemodynamically stable, and is tolerating antimicrobial therapy.  Patient is in the chair and feeling much better today      Review of Systems:   Review of Systems   Constitutional:  Positive for fatigue.   HENT: Negative.     Eyes: Negative.    Respiratory: Negative.     Cardiovascular: Negative.    Gastrointestinal: Negative.    Endocrine: Negative.    Genitourinary: Negative.    Musculoskeletal:  Positive for back pain.   Skin: Negative.    Neurological:  Positive for weakness.   Psychiatric/Behavioral: Negative.     All other systems reviewed and are negative.       Objective     Vital Signs  Temp:  [97.8 °F (36.6 °C)-98.2 °F (36.8 °C)] 98.2 °F (36.8 °C)  Heart Rate:  [65-77] 65  Resp:  [16-28] 22  BP: (106-131)/(48-62) 128/60    Physical Exam:  Physical Exam  Vitals and nursing note reviewed.   Constitutional:       General: She is not in acute distress.     Appearance: She is well-developed and normal weight. She is ill-appearing. She is not diaphoretic.   HENT:      Head: Normocephalic and atraumatic.   Eyes:      General: No scleral icterus.     Extraocular Movements: Extraocular movements intact.      Conjunctiva/sclera: Conjunctivae normal.      Pupils: Pupils are equal, round, and reactive to light.   Cardiovascular:      Rate and Rhythm: Normal rate and regular rhythm.      Heart sounds: Normal heart sounds, S1 normal and S2 normal. No murmur heard.  Pulmonary:      Effort: Pulmonary effort is normal. No respiratory distress.      Breath sounds: Normal breath sounds. No stridor. No  wheezing or rales.   Chest:      Chest wall: No tenderness.   Abdominal:      General: Bowel sounds are normal. There is no distension.      Palpations: Abdomen is soft. There is no mass.      Tenderness: There is no abdominal tenderness. There is no guarding.   Musculoskeletal:         General: No swelling, tenderness or deformity.      Cervical back: Neck supple.   Skin:     General: Skin is warm and dry.      Coloration: Skin is not pale.      Findings: No bruising, erythema or rash.   Neurological:      Mental Status: She is alert and oriented to person, place, and time.   Psychiatric:         Mood and Affect: Mood normal.          Results Review:    I have reviewed all clinical data, test, lab, and imaging results.     Radiology  No Radiology Exams Resulted Within Past 24 Hours    Cardiology    Laboratory    Results from last 7 days   Lab Units 06/26/25 0518 06/25/25 0436 06/24/25  1012   WBC 10*3/mm3 13.23* 22.96* 24.40*   HEMOGLOBIN g/dL 12.4 9.7* 10.0*   HEMATOCRIT % 40.9 34.0 33.1*   PLATELETS 10*3/mm3 86* 106* 115*     Results from last 7 days   Lab Units 06/26/25 0518 06/25/25  0436 06/24/25  1012   SODIUM mmol/L 135* 135* 133*   POTASSIUM mmol/L 3.7 3.8 4.1   CHLORIDE mmol/L 105 105 103   CO2 mmol/L 19.6* 16.8* 15.2*   BUN mg/dL 31.5* 30.2* 34.1*   CREATININE mg/dL 1.42* 1.39* 1.51*   GLUCOSE mg/dL 129* 134* 130*   ALBUMIN g/dL  --   --  3.6   BILIRUBIN mg/dL  --   --  0.6   ALK PHOS U/L  --   --  84   AST (SGOT) U/L  --   --  35*   ALT (SGPT) U/L  --   --  19   LIPASE U/L  --   --  12*   CALCIUM mg/dL 8.0* 8.1* 8.4*     Results from last 7 days   Lab Units 06/25/25  1120   CK TOTAL U/L 238*             Microbiology   Microbiology Results (last 10 days)       Procedure Component Value - Date/Time    Blood Culture - Blood, Arm, Left [[id number]]  (Abnormal) Collected: 06/24/25 1133    Lab Status: Preliminary result Specimen: Blood from Arm, Left Updated: 06/26/25 0628     Blood Culture Escherichia coli      Isolated from Anaerobic Bottle     Gram Stain Anaerobic Bottle Gram negative bacilli    Blood Culture ID, PCR - Blood, Arm, Left [[id number]]  (Abnormal) Collected: 06/24/25 1133    Lab Status: Final result Specimen: Blood from Arm, Left Updated: 06/25/25 0309     BCID, PCR Escherichia coli. Identification by BCID2 PCR.     BOTTLE TYPE Anaerobic Bottle    Narrative:      No resistance genes detected.    Urine Culture - Urine, Urine, Clean Catch [[id number]]  (Abnormal)  (Susceptibility) Collected: 06/24/25 1049    Lab Status: Final result Specimen: Urine, Clean Catch Updated: 06/26/25 1113     Urine Culture >100,000 CFU/mL Escherichia coli    Narrative:      Colonization of the urinary tract without infection is common. Treatment is discouraged unless the patient is symptomatic, pregnant, or undergoing an invasive urologic procedure.    Susceptibility        Escherichia coli      JUAN      Amoxicillin + Clavulanate Susceptible      Ampicillin Susceptible      Ampicillin + Sulbactam Susceptible      Cefazolin (Urine) Susceptible      Cefepime Susceptible      Ceftazidime Susceptible      Ceftriaxone Susceptible      Cefuroxime axetil Susceptible      Gentamicin Susceptible      Levofloxacin Susceptible      Nitrofurantoin Susceptible      Piperacillin + Tazobactam Susceptible      Trimethoprim + Sulfamethoxazole Susceptible                           Respiratory Panel PCR w/COVID-19(SARS-CoV-2) LESLEE/VIVIANE/PAVAN/PAD/COR/JUAN DAVID In-House, NP Swab in UTM/VTM, 2 HR TAT - Swab, Nasopharynx [[id number]]  (Normal) Collected: 06/24/25 1012    Lab Status: Final result Specimen: Swab from Nasopharynx Updated: 06/24/25 1119     ADENOVIRUS, PCR Not Detected     Coronavirus 229E Not Detected     Coronavirus HKU1 Not Detected     Coronavirus NL63 Not Detected     Coronavirus OC43 Not Detected     COVID19 Not Detected     Human Metapneumovirus Not Detected     Human Rhinovirus/Enterovirus Not Detected     Influenza A PCR Not Detected      Influenza B PCR Not Detected     Parainfluenza Virus 1 Not Detected     Parainfluenza Virus 2 Not Detected     Parainfluenza Virus 3 Not Detected     Parainfluenza Virus 4 Not Detected     RSV, PCR Not Detected     Bordetella pertussis pcr Not Detected     Bordetella parapertussis PCR Not Detected     Chlamydophila pneumoniae PCR Not Detected     Mycoplasma pneumo by PCR Not Detected    Narrative:      In the setting of a positive respiratory panel with a viral infection PLUS a negative procalcitonin without other underlying concern for bacterial infection, consider observing off antibiotics or discontinuation of antibiotics and continue supportive care. If the respiratory panel is positive for atypical bacterial infection (Bordetella pertussis, Chlamydophila pneumoniae, or Mycoplasma pneumoniae), consider antibiotic de-escalation to target atypical bacterial infection.    Blood Culture - Blood, Arm, Left [[id number]]  (Normal) Collected: 06/24/25 1012    Lab Status: Preliminary result Specimen: Blood from Arm, Left Updated: 06/26/25 1030     Blood Culture No growth at 2 days    Narrative:      Less than seven (7) mL's of blood was collected.  Insufficient quantity may yield false negative results.            Medication Review:       Schedule Meds  amLODIPine, 10 mg, Oral, Daily  apixaban, 2.5 mg, Oral, BID  cefTRIAXone, 2,000 mg, Intravenous, Q24H  docusate sodium, 100 mg, Oral, BID  ferrous sulfate, 325 mg, Oral, Once per day on Monday Wednesday Friday  insulin lispro, 2-7 Units, Subcutaneous, 4x Daily AC & at Bedtime  levothyroxine, 100 mcg, Oral, Q AM  losartan, 100 mg, Oral, Q24H  mirtazapine, 15 mg, Oral, Nightly  pantoprazole, 40 mg, Oral, Daily  rOPINIRole, 0.25 mg, Oral, Nightly  rosuvastatin, 10 mg, Oral, Nightly  sertraline, 150 mg, Oral, Daily  sodium chloride, 10 mL, Intravenous, Q12H  sodium chloride, 10 mL, Intravenous, Q12H        Infusion Meds       PRN Meds    acetaminophen **OR** acetaminophen  **OR** acetaminophen    Calcium Replacement - Follow Nurse / BPA Driven Protocol    dextrose    dextrose    glucagon (human recombinant)    Magnesium Standard Dose Replacement - Follow Nurse / BPA Driven Protocol    metoclopramide    Phosphorus Replacement - Follow Nurse / BPA Driven Protocol    Potassium Replacement - Follow Nurse / BPA Driven Protocol    [COMPLETED] Insert Peripheral IV **AND** sodium chloride    sodium chloride    sodium chloride    sodium chloride    sodium chloride    traMADol        Assessment & Plan       Antimicrobial Therapy   1.  IV ceftriaxone        2.        3.        4.        5.          Assessment     Transient E. coli bacteremia.  Most likely related to urinary tract infection.  Patient's main complaint has been weakness and falls over the last several months.     Urinary tract infection with urinalysis showing pyuria and bacteriuria.  Cultures pending.   CT did not show any obstructive uropathy     Lymphocytosis-most likely related to the patient's CLL.  Currently only under surveillance     Rhabdomyolysis-patient was on the floor for over 7 hours after a fall at home.  Creatinine and CK were elevated     History of breast cancer status post mastectomies in the past     Type 2 diabetes     Plan     Continue IV Rocephin 2 g every 24 hours.  Patient will need 2 weeks of antimicrobial therapy-last day on 7/8/2025  Patient will need a midline before discharge-please remove when IV antibiotics are completed  We can add CBC and creatinine x 2 weeks  Continue supportive care  Case discussed with patient and family member at bedside  Okay to discharge to rehab anytime from ID standpoint  Not much more to add from infectious disease standpoint-we will sign off at this time-please call with any questions.    Please fax all post discharge lab results, imaging studies and correspondence to this fax number [phone]  For any question or concern please contact our service number [phone]  [phone]         Kami Dias, APRN  06/26/25  15:59 EDT    Note is dictated utilizing voice recognition software/Dragon

## 2025-06-27 LAB
BACTERIA SPEC AEROBE CULT: ABNORMAL
GRAM STN SPEC: ABNORMAL
ISOLATED FROM: ABNORMAL

## 2025-06-27 NOTE — CASE MANAGEMENT/SOCIAL WORK
Case Management Discharge Note      Final Note: [facility]         Selected Continued Care - Discharged on 6/26/2025 Admission date: 6/24/2025 - Discharge disposition: Skilled Nursing Facility (DC - External)      Destination Coordination complete.      Service Provider Services Address Phone Fax Patient Preferred    [facility] [address] [place] [id number] [phone] --                      Transportation Services  Transportation: Private Transportation  Private: Car    Final Discharge Disposition Code: 03 - skilled nursing facility (SNF)

## 2025-06-29 LAB — BACTERIA SPEC AEROBE CULT: NORMAL

## 2025-07-10 ENCOUNTER — HOSPITAL ENCOUNTER (INPATIENT)
Facility: HOSPITAL | Age: 85
LOS: 5 days | Discharge: HOME | End: 2025-07-15
Attending: HOSPITALIST | Admitting: HOSPITALIST
Payer: MEDICARE

## 2025-07-10 ENCOUNTER — APPOINTMENT (OUTPATIENT)
Dept: GENERAL RADIOLOGY | Facility: HOSPITAL | Age: 85
End: 2025-07-10
Payer: MEDICARE

## 2025-07-10 DIAGNOSIS — J18.9 PNEUMONIA DUE TO INFECTIOUS ORGANISM, UNSPECIFIED LATERALITY, UNSPECIFIED PART OF LUNG: Primary | ICD-10-CM

## 2025-07-10 DIAGNOSIS — D72.829 LEUKOCYTOSIS, UNSPECIFIED TYPE: ICD-10-CM

## 2025-07-10 LAB
ALBUMIN SERPL-MCNC: 3.8 G/DL (ref 3.5–5.2)
ALBUMIN/GLOB SERPL: 1.6 G/DL
ALP SERPL-CCNC: 122 U/L (ref 39–117)
ALT SERPL W P-5'-P-CCNC: 13 U/L (ref 1–33)
ANION GAP SERPL CALCULATED.3IONS-SCNC: 12.4 MMOL/L (ref 5–15)
AST SERPL-CCNC: 16 U/L (ref 1–32)
B PARAPERT DNA SPEC QL NAA+PROBE: NOT DETECTED
B PERT DNA SPEC QL NAA+PROBE: NOT DETECTED
BILIRUB SERPL-MCNC: 0.2 MG/DL (ref 0–1.2)
BILIRUB UR QL STRIP: NEGATIVE
BUN SERPL-MCNC: 24.9 MG/DL (ref 8–23)
BUN/CREAT SERPL: 21.1 (ref 7–25)
C PNEUM DNA NPH QL NAA+NON-PROBE: NOT DETECTED
CALCIUM SPEC-SCNC: 9 MG/DL (ref 8.6–10.5)
CHLORIDE SERPL-SCNC: 106 MMOL/L (ref 98–107)
CLARITY UR: CLEAR
CO2 SERPL-SCNC: 21.6 MMOL/L (ref 22–29)
COLOR UR: YELLOW
CREAT SERPL-MCNC: 1.18 MG/DL (ref 0.57–1)
D-LACTATE SERPL-SCNC: 1.3 MMOL/L (ref 0.5–2)
DEPRECATED RDW RBC AUTO: 56.2 FL (ref 37–54)
EGFRCR SERPLBLD CKD-EPI 2021: 45.6 ML/MIN/1.73
ERYTHROCYTE [DISTWIDTH] IN BLOOD BY AUTOMATED COUNT: 16 % (ref 12.3–15.4)
FLUAV SUBTYP SPEC NAA+PROBE: NOT DETECTED
FLUBV RNA NPH QL NAA+NON-PROBE: NOT DETECTED
GLOBULIN UR ELPH-MCNC: 2.4 GM/DL
GLUCOSE SERPL-MCNC: 126 MG/DL (ref 65–99)
GLUCOSE UR STRIP-MCNC: NEGATIVE MG/DL
HADV DNA SPEC NAA+PROBE: NOT DETECTED
HCOV 229E RNA SPEC QL NAA+PROBE: NOT DETECTED
HCOV HKU1 RNA SPEC QL NAA+PROBE: NOT DETECTED
HCOV NL63 RNA SPEC QL NAA+PROBE: NOT DETECTED
HCOV OC43 RNA SPEC QL NAA+PROBE: NOT DETECTED
HCT VFR BLD AUTO: 33.7 % (ref 34–46.6)
HGB BLD-MCNC: 9.8 G/DL (ref 12–15.9)
HGB UR QL STRIP.AUTO: NEGATIVE
HMPV RNA NPH QL NAA+NON-PROBE: NOT DETECTED
HPIV1 RNA ISLT QL NAA+PROBE: NOT DETECTED
HPIV2 RNA SPEC QL NAA+PROBE: NOT DETECTED
HPIV3 RNA NPH QL NAA+PROBE: NOT DETECTED
HPIV4 P GENE NPH QL NAA+PROBE: NOT DETECTED
KETONES UR QL STRIP: ABNORMAL
LEUKOCYTE ESTERASE UR QL STRIP.AUTO: NEGATIVE
LYMPHOCYTES # BLD MANUAL: 12.9 10*3/MM3 (ref 0.7–3.1)
LYMPHOCYTES NFR BLD MANUAL: 2 % (ref 5–12)
M PNEUMO IGG SER IA-ACNC: NOT DETECTED
MCH RBC QN AUTO: 27.6 PG (ref 26.6–33)
MCHC RBC AUTO-ENTMCNC: 29.1 G/DL (ref 31.5–35.7)
MCV RBC AUTO: 94.9 FL (ref 79–97)
MONOCYTES # BLD: 0.41 10*3/MM3 (ref 0.1–0.9)
MRSA DNA SPEC QL NAA+PROBE: NORMAL
NEUTROPHILS # BLD AUTO: 6.76 10*3/MM3 (ref 1.7–7)
NEUTROPHILS NFR BLD MANUAL: 32 % (ref 42.7–76)
NEUTS BAND NFR BLD MANUAL: 1 % (ref 0–5)
NITRITE UR QL STRIP: NEGATIVE
OVALOCYTES BLD QL SMEAR: ABNORMAL
PATHOLOGY REVIEW: YES
PH UR STRIP.AUTO: <=5 [PH] (ref 5–8)
PLASMA CELL PREC NFR BLD MANUAL: 2 % (ref 0–0)
PLAT MORPH BLD: NORMAL
PLATELET # BLD AUTO: 205 10*3/MM3 (ref 140–450)
PMV BLD AUTO: 10.7 FL (ref 6–12)
POIKILOCYTOSIS BLD QL SMEAR: ABNORMAL
POTASSIUM SERPL-SCNC: 4.9 MMOL/L (ref 3.5–5.2)
PROCALCITONIN SERPL-MCNC: 0.09 NG/ML (ref 0–0.25)
PROT SERPL-MCNC: 6.2 G/DL (ref 6–8.5)
PROT UR QL STRIP: NEGATIVE
RBC # BLD AUTO: 3.55 10*6/MM3 (ref 3.77–5.28)
RHINOVIRUS RNA SPEC NAA+PROBE: NOT DETECTED
RSV RNA NPH QL NAA+NON-PROBE: NOT DETECTED
SARS-COV-2 RNA RESP QL NAA+PROBE: NOT DETECTED
SCAN SLIDE: NORMAL
SMUDGE CELLS BLD QL SMEAR: ABNORMAL
SODIUM SERPL-SCNC: 140 MMOL/L (ref 136–145)
SP GR UR STRIP: 1.01 (ref 1–1.03)
UROBILINOGEN UR QL STRIP: ABNORMAL
VARIANT LYMPHS NFR BLD MANUAL: 63 % (ref 19.6–45.3)
WBC NRBC COR # BLD AUTO: 20.47 10*3/MM3 (ref 3.4–10.8)

## 2025-07-10 PROCEDURE — 84145 PROCALCITONIN (PCT): CPT

## 2025-07-10 PROCEDURE — 36415 COLL VENOUS BLD VENIPUNCTURE: CPT

## 2025-07-10 PROCEDURE — 81003 URINALYSIS AUTO W/O SCOPE: CPT | Performed by: NURSE PRACTITIONER

## 2025-07-10 PROCEDURE — 25010000002 CEFEPIME PER 500 MG: Performed by: NURSE PRACTITIONER

## 2025-07-10 PROCEDURE — 71045 X-RAY EXAM CHEST 1 VIEW: CPT

## 2025-07-10 PROCEDURE — 87040 BLOOD CULTURE FOR BACTERIA: CPT | Performed by: NURSE PRACTITIONER

## 2025-07-10 PROCEDURE — 0202U NFCT DS 22 TRGT SARS-COV-2: CPT | Performed by: NURSE PRACTITIONER

## 2025-07-10 PROCEDURE — 83605 ASSAY OF LACTIC ACID: CPT | Performed by: HOSPITALIST

## 2025-07-10 PROCEDURE — 85007 BL SMEAR W/DIFF WBC COUNT: CPT | Performed by: NURSE PRACTITIONER

## 2025-07-10 PROCEDURE — 99285 EMERGENCY DEPT VISIT HI MDM: CPT

## 2025-07-10 PROCEDURE — 87641 MR-STAPH DNA AMP PROBE: CPT | Performed by: NURSE PRACTITIONER

## 2025-07-10 PROCEDURE — 80053 COMPREHEN METABOLIC PANEL: CPT | Performed by: NURSE PRACTITIONER

## 2025-07-10 PROCEDURE — 85025 COMPLETE CBC W/AUTO DIFF WBC: CPT | Performed by: NURSE PRACTITIONER

## 2025-07-10 RX ORDER — HYDROCODONE BITARTRATE AND ACETAMINOPHEN 5; 325 MG/1; MG/1
1 TABLET ORAL EVERY 6 HOURS PRN
Refills: 0 | Status: DISCONTINUED | OUTPATIENT
Start: 2025-07-10 | End: 2025-07-15 | Stop reason: HOSPADM

## 2025-07-10 RX ORDER — ACETAMINOPHEN 160 MG/5ML
650 SOLUTION ORAL EVERY 4 HOURS PRN
Status: DISCONTINUED | OUTPATIENT
Start: 2025-07-10 | End: 2025-07-15 | Stop reason: HOSPADM

## 2025-07-10 RX ORDER — ACETAMINOPHEN 650 MG/1
650 SUPPOSITORY RECTAL EVERY 4 HOURS PRN
Status: DISCONTINUED | OUTPATIENT
Start: 2025-07-10 | End: 2025-07-15 | Stop reason: HOSPADM

## 2025-07-10 RX ORDER — ACETAMINOPHEN 325 MG/1
650 TABLET ORAL EVERY 6 HOURS PRN
COMMUNITY

## 2025-07-10 RX ORDER — AMOXICILLIN 250 MG
2 CAPSULE ORAL 2 TIMES DAILY
Status: DISCONTINUED | OUTPATIENT
Start: 2025-07-10 | End: 2025-07-15 | Stop reason: HOSPADM

## 2025-07-10 RX ORDER — ONDANSETRON 2 MG/ML
4 INJECTION INTRAMUSCULAR; INTRAVENOUS EVERY 6 HOURS PRN
Status: DISCONTINUED | OUTPATIENT
Start: 2025-07-10 | End: 2025-07-15 | Stop reason: HOSPADM

## 2025-07-10 RX ORDER — LOSARTAN POTASSIUM 100 MG/1
100 TABLET ORAL DAILY
COMMUNITY

## 2025-07-10 RX ORDER — POLYETHYLENE GLYCOL 3350 17 G/17G
17 POWDER, FOR SOLUTION ORAL DAILY PRN
Status: DISCONTINUED | OUTPATIENT
Start: 2025-07-10 | End: 2025-07-15 | Stop reason: HOSPADM

## 2025-07-10 RX ORDER — BISACODYL 10 MG
10 SUPPOSITORY, RECTAL RECTAL DAILY PRN
Status: DISCONTINUED | OUTPATIENT
Start: 2025-07-10 | End: 2025-07-15 | Stop reason: HOSPADM

## 2025-07-10 RX ORDER — ACETAMINOPHEN 325 MG/1
650 TABLET ORAL EVERY 4 HOURS PRN
Status: DISCONTINUED | OUTPATIENT
Start: 2025-07-10 | End: 2025-07-15 | Stop reason: HOSPADM

## 2025-07-10 RX ORDER — NITROGLYCERIN 0.4 MG/1
0.4 TABLET SUBLINGUAL
Status: DISCONTINUED | OUTPATIENT
Start: 2025-07-10 | End: 2025-07-15 | Stop reason: HOSPADM

## 2025-07-10 RX ORDER — SODIUM CHLORIDE 0.9 % (FLUSH) 0.9 %
10 SYRINGE (ML) INJECTION AS NEEDED
Status: DISCONTINUED | OUTPATIENT
Start: 2025-07-10 | End: 2025-07-15 | Stop reason: HOSPADM

## 2025-07-10 RX ORDER — VANCOMYCIN 1.75 GRAM/500 ML IN 0.9 % SODIUM CHLORIDE INTRAVENOUS
23 ONCE
Status: COMPLETED | OUTPATIENT
Start: 2025-07-10 | End: 2025-07-10

## 2025-07-10 RX ORDER — ONDANSETRON 4 MG/1
4 TABLET, ORALLY DISINTEGRATING ORAL EVERY 6 HOURS PRN
Status: DISCONTINUED | OUTPATIENT
Start: 2025-07-10 | End: 2025-07-15 | Stop reason: HOSPADM

## 2025-07-10 RX ORDER — ATORVASTATIN CALCIUM 40 MG/1
40 TABLET, FILM COATED ORAL NIGHTLY
COMMUNITY

## 2025-07-10 RX ORDER — SODIUM CHLORIDE 0.9 % (FLUSH) 0.9 %
10 SYRINGE (ML) INJECTION EVERY 12 HOURS SCHEDULED
Status: DISCONTINUED | OUTPATIENT
Start: 2025-07-10 | End: 2025-07-15 | Stop reason: HOSPADM

## 2025-07-10 RX ORDER — BISACODYL 5 MG/1
5 TABLET, DELAYED RELEASE ORAL DAILY PRN
Status: DISCONTINUED | OUTPATIENT
Start: 2025-07-10 | End: 2025-07-15 | Stop reason: HOSPADM

## 2025-07-10 RX ORDER — SERTRALINE HYDROCHLORIDE 150 MG/1
150 CAPSULE ORAL DAILY
COMMUNITY

## 2025-07-10 RX ORDER — SODIUM CHLORIDE 9 MG/ML
40 INJECTION, SOLUTION INTRAVENOUS AS NEEDED
Status: DISCONTINUED | OUTPATIENT
Start: 2025-07-10 | End: 2025-07-15 | Stop reason: HOSPADM

## 2025-07-10 RX ADMIN — SENNOSIDES AND DOCUSATE SODIUM 2 TABLET: 50; 8.6 TABLET ORAL at 22:31

## 2025-07-10 RX ADMIN — Medication 1750 MG: at 20:36

## 2025-07-10 RX ADMIN — CEFEPIME 2000 MG: 2 INJECTION, POWDER, FOR SOLUTION INTRAVENOUS at 18:29

## 2025-07-10 RX ADMIN — Medication 10 ML: at 22:31

## 2025-07-10 NOTE — ED NOTES
Pt sent from ECF for elevated wbc's pt is alert and oriented x 4 family member at bedside. Call system in reach

## 2025-07-10 NOTE — ED PROVIDER NOTES
Subjective   Chief Complaint   Patient presents with    Abnormal Lab       History of Present Illness  Patient is an 85-year-old female presents to the ED for evaluation of abnormal lab work.  Patient was released from [facility] today.  She was admitted for UTI, was receiving IV antibiotics, she had lab work that revealed a leukocytosis.    Patient reports she continues to feel poorly, generally weak, she is wearing her oxygen most of the time and she typically only wears it at night.  She does report she has had a cough.    Review of Systems    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/2019    Depression     Diabetes mellitus     Disease of thyroid gland     Diverticulosis of colon 2018    Identified on colonoscopy    DVT (deep venous thrombosis)     Dysphagia 12/2020    Dyspnea on exertion     Fall at home 10/11/2023    Hemorrhoid     Hiatal hernia 12/2020    Hiatal hernia with GERD     large hiatal hernia with high-grade reflux on barium swallow; s/p laparoscopic fundoplication with gastropexy    History of breast cancer 10/11/2023    History of cigarette smoking 10/11/2023    History of diabetes mellitus     no meds now    HL (hearing loss)     Hyperlipidemia     Hypertension     Knee swelling     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    aspiration pneumonia; completed abx therapy    OAB (overactive bladder)     Personal history of CLL (chronic lymphocytic leukemia) 10/11/2023    Pulmonary emboli     Skin cancer     Sleep apnea     daughter states pt does not have and doesn't have machine       No Known Allergies    Past Surgical History:   Procedure Laterality Date    BLADDER SURGERY      BREAST BIOPSY      BRONCHOSCOPY N/A 09/13/2019    Procedure: BRONCHOSCOPY with bronchial  washing;  Surgeon: Marnie Frankel MD;  Location: [facility] ENDOSCOPY;  Service: Pulmonary    BRONCHOSCOPY Bilateral 10/18/2023    Procedure: BRONCHOSCOPY AT BEDSIDE;  Surgeon: Vinicius Heredia DO;  Location: [facility] ENDOSCOPY;  Service: Pulmonary;  Laterality: Bilateral;    COLONOSCOPY  2018    severe diverticulosis    CYST REMOVAL      Removed a fatty cyst off of her back    ENDOSCOPY N/A 2020    Procedure: ESOPHAGOGASTRODUODENOSCOPY with dilatation (Bougie # 48, 50, 52, 54, 56, 58);  Surgeon: Den Plummer DO;  Location: [facility] ENDOSCOPY;  Service: General;  Laterality: N/A;  Post: large hiatal hernia, joshua's ulcers    EYE SURGERY      HERNIA REPAIR      HIATAL HERNIA REPAIR N/A 2020    Procedure: Laparoscopic hiatal hernia repair with gastropexy;  Surgeon: Den Plummer DO;  Location: [facility] MAIN OR;  Service: General;  Laterality: N/A;    MASTECTOMY Right 2019    Invasive ductal carcinoma    TUBAL ABDOMINAL LIGATION         Family History   Problem Relation Age of Onset    Diabetes Mother     Arthritis Other     Heart disease Other     Arthritis Father     Hyperlipidemia Father     Miscarriages / Stillbirths Daughter        Social History     Socioeconomic History    Marital status:    Tobacco Use    Smoking status: Former     Current packs/day: 0.00     Average packs/day: 0.5 packs/day for 2.0 years (1.0 ttl pk-yrs)     Types: Cigarettes     Start date: 1990     Quit date: 1992     Years since quittin.5    Smokeless tobacco: Never   Vaping Use    Vaping status: Never Used   Substance and Sexual Activity    Alcohol use: No    Drug use: No    Sexual activity: Not Currently     Partners: Male           Objective   Physical Exam  Vitals and nursing note reviewed.   Constitutional:       Appearance: Normal appearance. She is not toxic-appearing.   HENT:      Head: Normocephalic and atraumatic.      Right Ear: There is no impacted cerumen.      Left Ear: There is no  impacted cerumen.      Nose: Nose normal.      Mouth/Throat:      Mouth: Mucous membranes are moist.      Pharynx: Oropharynx is clear.   Eyes:      Extraocular Movements: Extraocular movements intact.      Conjunctiva/sclera: Conjunctivae normal.      Pupils: Pupils are equal, round, and reactive to light.   Cardiovascular:      Rate and Rhythm: Normal rate and regular rhythm.      Heart sounds: Normal heart sounds. No murmur heard.     No friction rub. No gallop.   Pulmonary:      Effort: Pulmonary effort is normal.      Breath sounds: Normal breath sounds.   Abdominal:      General: Bowel sounds are normal.      Palpations: Abdomen is soft.   Musculoskeletal:      Cervical back: Normal range of motion and neck supple.   Skin:     General: Skin is warm and dry.      Capillary Refill: Capillary refill takes less than 2 seconds.   Neurological:      General: No focal deficit present.      Mental Status: She is alert and oriented to person, place, and time.         Procedures           ED Course        XR Chest 1 View   Final Result   Impression:   Mild bibasal patchy opacities which could represent atelectasis or pneumonia.            Electronically Signed: Osman Franz MD     7/10/2025 5:35 PM EDT     Workstation ID: [id number]                                                       Medical Decision Making  Problems Addressed:  Leukocytosis, unspecified type: complicated acute illness or injury  Pneumonia due to infectious organism, unspecified laterality, unspecified part of lung: complicated acute illness or injury    Amount and/or Complexity of Data Reviewed  Labs: ordered.  Radiology: ordered.    Risk  Prescription drug management.  Decision regarding hospitalization.    Appropriate PPE worn during patient interactions.    Chart Review: Chart summary 6/26/2025 for leukocytosis, falls, UTI.    Differential diagnoses considered for patient chief complaint and presentation, this list is not all inclusive of  diagnoses considered: Pneumonia, UTI, IV site infection.  Patient's of the above exam and workup.  Leukocytosis 20.47, hemoglobin 9.8, reviewed previous.  Creatinine 1.18.  UA negative.  Respiratory panel pending.  Chest x-ray was reviewed, concerning for pneumonia, given patient's had a cough, increasing use in her oxygen, will treat for pneumonia.  Placed on antibiotics for hospital-acquired pneumonia.    Discussed with hospitalist.  Will admit    I discussed with the patient their test results, work-up here in the emergency department, and need for admission and further evaluation. Patient is agreeable to the plan of care. At time of disposition patient's VS are reviewed, and patient without acute distress.  Opportunity was provided for questions at the bedside, all questions and concerns were addressed.      Note Disclaimer: At [facility], we believe that sharing information builds trust and better relationships. You are receiving this note because you recently visited [facility]. It is possible you will see health information before a provider has talked with you about it. This kind of information can be easy to misunderstand. To help you fully understand what it means for your health, we urge you to discuss this note with your provider  Note dictated utilizing Dragon Dictation.          Final diagnoses:   Pneumonia due to infectious organism, unspecified laterality, unspecified part of lung   Leukocytosis, unspecified type       ED Disposition  ED Disposition       ED Disposition   Decision to Admit    Condition   --    Comment   Level of Care: Telemetry [5]   Diagnosis: Pneumonia [419897]   Admitting Physician: RUSS DAVALOS [[id number]]   Attending Physician: RUSS DAVALOS [[id number]]   Certification: I Certify That Inpatient Hospital Services Are Medically Necessary For Greater Than 2 Midnights                 No follow-up provider specified.       Medication List      No changes were made to your  prescriptions during this visit.            Minda Alatorre, APRN  07/10/25 3157

## 2025-07-11 ENCOUNTER — APPOINTMENT (OUTPATIENT)
Dept: CT IMAGING | Facility: HOSPITAL | Age: 85
End: 2025-07-11
Payer: MEDICARE

## 2025-07-11 LAB
ALBUMIN SERPL-MCNC: 3.5 G/DL (ref 3.5–5.2)
ALBUMIN/GLOB SERPL: 1.4 G/DL
ALP SERPL-CCNC: 115 U/L (ref 39–117)
ALT SERPL W P-5'-P-CCNC: 12 U/L (ref 1–33)
ANION GAP SERPL CALCULATED.3IONS-SCNC: 9.6 MMOL/L (ref 5–15)
ANISOCYTOSIS BLD QL: ABNORMAL
AST SERPL-CCNC: 15 U/L (ref 1–32)
BASOPHILS # BLD MANUAL: 0.19 10*3/MM3 (ref 0–0.2)
BASOPHILS NFR BLD MANUAL: 1 % (ref 0–1.5)
BILIRUB SERPL-MCNC: <0.2 MG/DL (ref 0–1.2)
BUN SERPL-MCNC: 24.6 MG/DL (ref 8–23)
BUN/CREAT SERPL: 24.4 (ref 7–25)
CALCIUM SPEC-SCNC: 9.1 MG/DL (ref 8.6–10.5)
CHLORIDE SERPL-SCNC: 109 MMOL/L (ref 98–107)
CO2 SERPL-SCNC: 22.4 MMOL/L (ref 22–29)
CREAT SERPL-MCNC: 1.01 MG/DL (ref 0.57–1)
DEPRECATED RDW RBC AUTO: 55.3 FL (ref 37–54)
EGFRCR SERPLBLD CKD-EPI 2021: 55 ML/MIN/1.73
ERYTHROCYTE [DISTWIDTH] IN BLOOD BY AUTOMATED COUNT: 15.9 % (ref 12.3–15.4)
GLOBULIN UR ELPH-MCNC: 2.5 GM/DL
GLUCOSE BLDC GLUCOMTR-MCNC: 113 MG/DL (ref 70–105)
GLUCOSE BLDC GLUCOMTR-MCNC: 138 MG/DL (ref 70–105)
GLUCOSE BLDC GLUCOMTR-MCNC: 138 MG/DL (ref 70–105)
GLUCOSE BLDC GLUCOMTR-MCNC: 172 MG/DL (ref 70–105)
GLUCOSE SERPL-MCNC: 111 MG/DL (ref 65–99)
HCT VFR BLD AUTO: 32.4 % (ref 34–46.6)
HGB BLD-MCNC: 9.3 G/DL (ref 12–15.9)
LAB AP CASE REPORT: NORMAL
LYMPHOCYTES # BLD MANUAL: 11.94 10*3/MM3 (ref 0.7–3.1)
LYMPHOCYTES NFR BLD MANUAL: 9 % (ref 5–12)
MAGNESIUM SERPL-MCNC: 1.6 MG/DL (ref 1.6–2.4)
MCH RBC QN AUTO: 27.4 PG (ref 26.6–33)
MCHC RBC AUTO-ENTMCNC: 28.7 G/DL (ref 31.5–35.7)
MCV RBC AUTO: 95.6 FL (ref 79–97)
MONOCYTES # BLD: 1.71 10*3/MM3 (ref 0.1–0.9)
NEUTROPHILS # BLD AUTO: 5.12 10*3/MM3 (ref 1.7–7)
NEUTROPHILS NFR BLD MANUAL: 24 % (ref 42.7–76)
NEUTS BAND NFR BLD MANUAL: 3 % (ref 0–5)
PATH REPORT.FINAL DX SPEC: NORMAL
PHOSPHATE SERPL-MCNC: 3.7 MG/DL (ref 2.5–4.5)
PLAT MORPH BLD: NORMAL
PLATELET # BLD AUTO: 177 10*3/MM3 (ref 140–450)
PMV BLD AUTO: 11.1 FL (ref 6–12)
POTASSIUM SERPL-SCNC: 4.8 MMOL/L (ref 3.5–5.2)
PROT SERPL-MCNC: 6 G/DL (ref 6–8.5)
RBC # BLD AUTO: 3.39 10*6/MM3 (ref 3.77–5.28)
SCAN SLIDE: NORMAL
SMUDGE CELLS BLD QL SMEAR: ABNORMAL
SODIUM SERPL-SCNC: 141 MMOL/L (ref 136–145)
VARIANT LYMPHS NFR BLD MANUAL: 63 % (ref 19.6–45.3)
WBC NRBC COR # BLD AUTO: 18.96 10*3/MM3 (ref 3.4–10.8)

## 2025-07-11 PROCEDURE — 94664 DEMO&/EVAL PT USE INHALER: CPT

## 2025-07-11 PROCEDURE — 94799 UNLISTED PULMONARY SVC/PX: CPT

## 2025-07-11 PROCEDURE — 80053 COMPREHEN METABOLIC PANEL: CPT | Performed by: STUDENT IN AN ORGANIZED HEALTH CARE EDUCATION/TRAINING PROGRAM

## 2025-07-11 PROCEDURE — 85007 BL SMEAR W/DIFF WBC COUNT: CPT | Performed by: STUDENT IN AN ORGANIZED HEALTH CARE EDUCATION/TRAINING PROGRAM

## 2025-07-11 PROCEDURE — 82948 REAGENT STRIP/BLOOD GLUCOSE: CPT | Performed by: STUDENT IN AN ORGANIZED HEALTH CARE EDUCATION/TRAINING PROGRAM

## 2025-07-11 PROCEDURE — 71250 CT THORAX DX C-: CPT

## 2025-07-11 PROCEDURE — 25010000002 CEFTRIAXONE PER 250 MG

## 2025-07-11 PROCEDURE — 85025 COMPLETE CBC W/AUTO DIFF WBC: CPT | Performed by: STUDENT IN AN ORGANIZED HEALTH CARE EDUCATION/TRAINING PROGRAM

## 2025-07-11 PROCEDURE — 82948 REAGENT STRIP/BLOOD GLUCOSE: CPT

## 2025-07-11 PROCEDURE — 84100 ASSAY OF PHOSPHORUS: CPT | Performed by: STUDENT IN AN ORGANIZED HEALTH CARE EDUCATION/TRAINING PROGRAM

## 2025-07-11 PROCEDURE — 94761 N-INVAS EAR/PLS OXIMETRY MLT: CPT

## 2025-07-11 PROCEDURE — 25010000002 CEFEPIME PER 500 MG

## 2025-07-11 PROCEDURE — 83735 ASSAY OF MAGNESIUM: CPT | Performed by: STUDENT IN AN ORGANIZED HEALTH CARE EDUCATION/TRAINING PROGRAM

## 2025-07-11 RX ORDER — NICOTINE POLACRILEX 4 MG
15 LOZENGE BUCCAL
Status: DISCONTINUED | OUTPATIENT
Start: 2025-07-11 | End: 2025-07-15 | Stop reason: HOSPADM

## 2025-07-11 RX ORDER — IBUPROFEN 600 MG/1
1 TABLET ORAL
Status: DISCONTINUED | OUTPATIENT
Start: 2025-07-11 | End: 2025-07-15 | Stop reason: HOSPADM

## 2025-07-11 RX ORDER — CHLORTHALIDONE 25 MG/1
25 TABLET ORAL DAILY
Status: DISCONTINUED | OUTPATIENT
Start: 2025-07-11 | End: 2025-07-15 | Stop reason: HOSPADM

## 2025-07-11 RX ORDER — IPRATROPIUM BROMIDE AND ALBUTEROL SULFATE 2.5; .5 MG/3ML; MG/3ML
3 SOLUTION RESPIRATORY (INHALATION)
Status: DISCONTINUED | OUTPATIENT
Start: 2025-07-11 | End: 2025-07-15 | Stop reason: HOSPADM

## 2025-07-11 RX ORDER — ATORVASTATIN CALCIUM 40 MG/1
40 TABLET, FILM COATED ORAL NIGHTLY
Status: DISCONTINUED | OUTPATIENT
Start: 2025-07-11 | End: 2025-07-15 | Stop reason: HOSPADM

## 2025-07-11 RX ORDER — BUDESONIDE 0.5 MG/2ML
0.5 INHALANT ORAL
Status: DISCONTINUED | OUTPATIENT
Start: 2025-07-11 | End: 2025-07-15 | Stop reason: HOSPADM

## 2025-07-11 RX ORDER — DEXTROSE MONOHYDRATE 25 G/50ML
25 INJECTION, SOLUTION INTRAVENOUS
Status: DISCONTINUED | OUTPATIENT
Start: 2025-07-11 | End: 2025-07-15 | Stop reason: HOSPADM

## 2025-07-11 RX ORDER — SERTRALINE HYDROCHLORIDE 25 MG/1
25 TABLET, FILM COATED ORAL DAILY
Status: DISCONTINUED | OUTPATIENT
Start: 2025-07-11 | End: 2025-07-15 | Stop reason: HOSPADM

## 2025-07-11 RX ORDER — LEVOTHYROXINE SODIUM 100 UG/1
100 TABLET ORAL
Status: DISCONTINUED | OUTPATIENT
Start: 2025-07-11 | End: 2025-07-15 | Stop reason: HOSPADM

## 2025-07-11 RX ORDER — INSULIN LISPRO 100 [IU]/ML
2-7 INJECTION, SOLUTION INTRAVENOUS; SUBCUTANEOUS
Status: DISCONTINUED | OUTPATIENT
Start: 2025-07-11 | End: 2025-07-15 | Stop reason: HOSPADM

## 2025-07-11 RX ORDER — AMLODIPINE BESYLATE 5 MG/1
10 TABLET ORAL DAILY
Status: DISCONTINUED | OUTPATIENT
Start: 2025-07-11 | End: 2025-07-15 | Stop reason: HOSPADM

## 2025-07-11 RX ORDER — MIRTAZAPINE 15 MG/1
15 TABLET, FILM COATED ORAL NIGHTLY
Status: DISCONTINUED | OUTPATIENT
Start: 2025-07-11 | End: 2025-07-15 | Stop reason: HOSPADM

## 2025-07-11 RX ORDER — PANTOPRAZOLE SODIUM 40 MG/1
40 TABLET, DELAYED RELEASE ORAL
Status: DISCONTINUED | OUTPATIENT
Start: 2025-07-11 | End: 2025-07-15 | Stop reason: HOSPADM

## 2025-07-11 RX ORDER — ROPINIROLE 0.25 MG/1
0.25 TABLET, FILM COATED ORAL NIGHTLY
Status: DISCONTINUED | OUTPATIENT
Start: 2025-07-11 | End: 2025-07-15 | Stop reason: HOSPADM

## 2025-07-11 RX ORDER — DOCUSATE SODIUM 100 MG/1
100 CAPSULE, LIQUID FILLED ORAL 2 TIMES DAILY
Status: DISCONTINUED | OUTPATIENT
Start: 2025-07-11 | End: 2025-07-15 | Stop reason: HOSPADM

## 2025-07-11 RX ADMIN — CHLORTHALIDONE 25 MG: 25 TABLET ORAL at 17:37

## 2025-07-11 RX ADMIN — ATORVASTATIN CALCIUM 40 MG: 40 TABLET, FILM COATED ORAL at 20:47

## 2025-07-11 RX ADMIN — AMLODIPINE BESYLATE 10 MG: 5 TABLET ORAL at 08:25

## 2025-07-11 RX ADMIN — SERTRALINE HYDROCHLORIDE 25 MG: 25 TABLET, FILM COATED ORAL at 08:24

## 2025-07-11 RX ADMIN — Medication 10 ML: at 08:24

## 2025-07-11 RX ADMIN — MIRTAZAPINE 15 MG: 15 TABLET, FILM COATED ORAL at 03:03

## 2025-07-11 RX ADMIN — Medication 10 ML: at 20:48

## 2025-07-11 RX ADMIN — BUDESONIDE 0.5 MG: 0.5 SUSPENSION RESPIRATORY (INHALATION) at 18:45

## 2025-07-11 RX ADMIN — CEFEPIME 2000 MG: 2 INJECTION, POWDER, FOR SOLUTION INTRAVENOUS at 05:55

## 2025-07-11 RX ADMIN — IPRATROPIUM BROMIDE AND ALBUTEROL SULFATE 3 ML: .5; 3 SOLUTION RESPIRATORY (INHALATION) at 18:45

## 2025-07-11 RX ADMIN — ROPINIROLE HYDROCHLORIDE 0.25 MG: 0.25 TABLET, FILM COATED ORAL at 03:03

## 2025-07-11 RX ADMIN — IPRATROPIUM BROMIDE AND ALBUTEROL SULFATE 3 ML: .5; 3 SOLUTION RESPIRATORY (INHALATION) at 08:02

## 2025-07-11 RX ADMIN — BUDESONIDE 0.5 MG: 0.5 SUSPENSION RESPIRATORY (INHALATION) at 08:02

## 2025-07-11 RX ADMIN — CEFTRIAXONE 2000 MG: 2 INJECTION, POWDER, FOR SOLUTION INTRAMUSCULAR; INTRAVENOUS at 13:54

## 2025-07-11 RX ADMIN — PANTOPRAZOLE SODIUM 40 MG: 40 TABLET, DELAYED RELEASE ORAL at 08:25

## 2025-07-11 RX ADMIN — ROPINIROLE HYDROCHLORIDE 0.25 MG: 0.25 TABLET, FILM COATED ORAL at 20:47

## 2025-07-11 RX ADMIN — SENNOSIDES AND DOCUSATE SODIUM 2 TABLET: 50; 8.6 TABLET ORAL at 08:25

## 2025-07-11 RX ADMIN — APIXABAN 2.5 MG: 2.5 TABLET, FILM COATED ORAL at 20:47

## 2025-07-11 RX ADMIN — ATORVASTATIN CALCIUM 40 MG: 40 TABLET, FILM COATED ORAL at 03:03

## 2025-07-11 RX ADMIN — MIRTAZAPINE 15 MG: 15 TABLET, FILM COATED ORAL at 20:47

## 2025-07-11 RX ADMIN — LEVOTHYROXINE SODIUM 100 MCG: 100 TABLET ORAL at 05:55

## 2025-07-11 NOTE — PROGRESS NOTES
"Pharmacy Antimicrobial Dosing Service    Subjective:  Diane Guan is a 85 y.o.female admitted with abnomal lab work. Pharmacy has been consulted to dose Vancomycin for possible PNU.    PMH: extensive medical hx, see H&P; PE, CLL, bronchitis, CA,       Assessment/Plan    1. Day #1 Vancomycin: 1000mg IV q24 h for estCrCl > 30 mL/min.  -1250 mg IV x1 loading dose given in ED  -follow with 1 gm q 24 hr,   -predicted AUC of 480mg/L.hr (85 yrs old / crcl 37 ml / min)  -follow levels/labs/cx and adjust per protocol, as needed    2. Day #1 Cefepime: 2gm IV q12 h for estCrCl > 30 mL/min.    Will continue to monitor drug levels, renal function, culture and sensitivities, and patient clinical status.       Objective:  Relevant clinical data and objective history reviewed:  162.6 cm (64\")   82.2 kg (181 lb 3.5 oz)   Ideal body weight: 54.7 kg (120 lb 9.5 oz)  Adjusted ideal body weight: 65.7 kg (144 lb 13.5 oz)  Body mass index is 31.11 kg/m².    Results from last 7 days   Lab Units 07/10/25  1700   CREATININE mg/dL 1.18*     Estimated Creatinine Clearance: 36.8 mL/min (A) (by C-G formula based on SCr of 1.18 mg/dL (H)).  No intake/output data recorded.    Results from last 7 days   Lab Units 07/10/25  1700   WBC 10*3/mm3 20.47*     Temperature    07/10/25 1553 07/10/25 2110   Temp: 99.4 °F (37.4 °C) 97.9 °F (36.6 °C)     Baseline culture/source/susceptibility:  Microbiology Results (last 10 days)       Procedure Component Value - Date/Time    MRSA Screen, PCR (Inpatient) - Swab, Nares [[id number]]  (Normal) Collected: 07/10/25 1837    Lab Status: Final result Specimen: Swab from Nares Updated: 07/10/25 2014     MRSA PCR No MRSA Detected    Narrative:      The negative predictive value of this diagnostic test is high and should only be used to consider de-escalating anti-MRSA therapy. A positive result may indicate colonization with MRSA and must be correlated clinically.    Respiratory Panel PCR w/COVID-19(SARS-CoV-2) " LESLEE/VIVIANE/PAVAN/PAD/COR/JUAN DAVID In-House, NP Swab in UTM/VTM, 2 HR TAT - Swab, Nasopharynx [[id number]]  (Normal) Collected: 07/10/25 1753    Lab Status: Final result Specimen: Swab from Nasopharynx Updated: 07/10/25 1907     ADENOVIRUS, PCR Not Detected     Coronavirus 229E Not Detected     Coronavirus HKU1 Not Detected     Coronavirus NL63 Not Detected     Coronavirus OC43 Not Detected     COVID19 Not Detected     Human Metapneumovirus Not Detected     Human Rhinovirus/Enterovirus Not Detected     Influenza A PCR Not Detected     Influenza B PCR Not Detected     Parainfluenza Virus 1 Not Detected     Parainfluenza Virus 2 Not Detected     Parainfluenza Virus 3 Not Detected     Parainfluenza Virus 4 Not Detected     RSV, PCR Not Detected     Bordetella pertussis pcr Not Detected     Bordetella parapertussis PCR Not Detected     Chlamydophila pneumoniae PCR Not Detected     Mycoplasma pneumo by PCR Not Detected    Narrative:      In the setting of a positive respiratory panel with a viral infection PLUS a negative procalcitonin without other underlying concern for bacterial infection, consider observing off antibiotics or discontinuation of antibiotics and continue supportive care. If the respiratory panel is positive for atypical bacterial infection (Bordetella pertussis, Chlamydophila pneumoniae, or Mycoplasma pneumoniae), consider antibiotic de-escalation to target atypical bacterial infection.             Wiliam Aragon RPH  07/10/25 22:27 EDT

## 2025-07-11 NOTE — SIGNIFICANT NOTE
07/11/25 1501   OTHER   Discipline occupational therapist   Rehab Time/Intention   Session Not Performed patient unavailable for evaluation  (Off eliseo CT. Cont to f/u.)   Therapy Assessment/Plan (PT)   Criteria for Skilled Interventions Met (PT) yes;meets criteria;skilled treatment is necessary   Recommendation   OT - Next Appointment 07/13/25

## 2025-07-11 NOTE — H&P
[facility]  History & Physical    Patient Name: Diane Guan  : 1940  MRN: [id number]  Primary Care Physician:  Asia Queen APRN  Date of admission: 7/10/2025  Date and Time of Service: 7/10/2025 at 2004    Subjective      Chief Complaint: Cough, abnormal labs    History of Present Illness:  Diane Guan is a 84 y.o. female with a previous medical history of CLL, CKD stage 3B, DM, HLD, HTN, Hypothyroidism  who presented from rehab to [facility] on 7/10/2025 with cough abnormal labs with leukocytosis.    Patient is currently awake, alert, conversational, daughter present at bedside who provided the history.  Patient daughter reported patient is currently at rehab facility was discharged recently after UTI treatment, was treated with IV antibiotics for 10 days, however at the facility she has developed cough, generalized weakness.  Her labs were drawn at the rehab facility found to have leukocytosis with WBC of 18's.  She was sent to hospital for further evaluation.  Patient otherwise denies any chest pain, does report cough, denies any fevers does report some chills, denies any dysuria at this time.      In ED, patient vital stable, afebrile, CMP remarkable creatinine of 1.18, improved from her past admission 1 to 2 weeks ago during which her creatinine was 1.8, WBC of 20.7, hemoglobin of 9.8, platelets 205, UA negative for bacteria, respiratory panel negative, blood cultures pending, MRSA screen negative.  Chest x-ray showing mild bibasilar patchy opacity which could represent atelectasis or pneumonia.  She was given a dose of vancomycin and cefepime, admitted to medicine team for further inpatient management.    Review of Systems negative unless mentioned above  Personal History     Past Medical History:   Diagnosis Date    Acute bronchitis due to human metapneumovirus 2020    Anxiety disorder     Arthritis     Arthritis of back     Breast cancer  02/2019    Invasive ductal carcinoma    Chronic lymphocytic leukemia (CLL), B-cell 01/2019    Depression     Diabetes mellitus     Disease of thyroid gland     Diverticulosis of colon 2018    Identified on colonoscopy    DVT (deep venous thrombosis)     Dysphagia 12/2020    Dyspnea on exertion     Fall at home 10/11/2023    Hemorrhoid     Hiatal hernia 12/2020    Hiatal hernia with GERD     large hiatal hernia with high-grade reflux on barium swallow; s/p laparoscopic fundoplication with gastropexy    History of breast cancer 10/11/2023    History of cigarette smoking 10/11/2023    History of diabetes mellitus     no meds now    HL (hearing loss)     Hyperlipidemia     Hypertension     Knee swelling     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    aspiration pneumonia; completed abx therapy    OAB (overactive bladder)     Personal history of CLL (chronic lymphocytic leukemia) 10/11/2023    Pulmonary emboli     Skin cancer     Sleep apnea     daughter states pt does not have and doesn't have machine       Past Surgical History:   Procedure Laterality Date    BLADDER SURGERY      BREAST BIOPSY      BRONCHOSCOPY N/A 09/13/2019    Procedure: BRONCHOSCOPY with bronchial washing;  Surgeon: Marnie Frankel MD;  Location: [facility] ENDOSCOPY;  Service: Pulmonary    BRONCHOSCOPY Bilateral 10/18/2023    Procedure: BRONCHOSCOPY AT BEDSIDE;  Surgeon: Vinicius Heredia DO;  Location: [facility] ENDOSCOPY;  Service: Pulmonary;  Laterality: Bilateral;    COLONOSCOPY  07/19/2018    severe diverticulosis    CYST REMOVAL      Removed a fatty cyst off of her back    ENDOSCOPY N/A 11/23/2020    Procedure: ESOPHAGOGASTRODUODENOSCOPY with dilatation (Bougie # 48, 50, 52, 54, 56, 58);  Surgeon: Den Plummer DO;  Location: [facility] ENDOSCOPY;  Service: General;  Laterality: N/A;  Post: large hiatal hernia, joshua's ulcers    EYE SURGERY      HERNIA REPAIR      HIATAL HERNIA REPAIR N/A 12/30/2020    Procedure: Laparoscopic hiatal hernia repair with  gastropexy;  Surgeon: Den Plummer DO;  Location: [facility] MAIN OR;  Service: General;  Laterality: N/A;    MASTECTOMY Right 02/04/2019    Invasive ductal carcinoma    TUBAL ABDOMINAL LIGATION         Family History: family history includes Arthritis in her father and another family member; Diabetes in her mother; Heart disease in an other family member; Hyperlipidemia in her father; Miscarriages / Stillbirths in her daughter. Otherwise pertinent FHx was reviewed and not pertinent to current issue.    Social History:  reports that she quit smoking about 33 years ago. Her smoking use included cigarettes. She started smoking about 35 years ago. She has a 1 pack-year smoking history. She has never used smokeless tobacco. She reports that she does not drink alcohol and does not use drugs.    Home Medications:  Prior to Admission Medications       Prescriptions Last Dose Informant Patient Reported? Taking?    acetaminophen (TYLENOL) 325 MG tablet   Yes Yes    Take 2 tablets by mouth Every 6 (Six) Hours As Needed for Mild Pain.    amLODIPine (NORVASC) 10 MG tablet 7/10/2025  No Yes    Take 1 tablet by mouth Daily. for high blood pressure    apixaban (ELIQUIS) 2.5 MG tablet tablet 7/10/2025  No Yes    Take 1 tablet by mouth 2 (Two) Times a Day.    atorvastatin (LIPITOR) 40 MG tablet 7/9/2025  Yes Yes    Take 1 tablet by mouth Every Night.    chlorthalidone (HYGROTON) 25 MG tablet 7/10/2025  Yes Yes    Take 1 tablet by mouth Daily.    docusate sodium (COLACE) 100 MG capsule 7/10/2025  Yes Yes    Take 1 capsule by mouth 2 (Two) Times a Day. Indications: Constipation    ferrous sulfate 324 (65 Fe) MG tablet delayed-release EC tablet 7/9/2025  Yes Yes    Take 1 tablet by mouth 3 (Three) Times a Week. On Monday, Wednesday, and Friday  Indications: Iron Deficiency    levothyroxine (SYNTHROID, LEVOTHROID) 100 MCG tablet 7/10/2025  No Yes    TAKE 1 TABLET BY MOUTH EVERY DAY    losartan (COZAAR) 100 MG tablet 7/10/2025  Yes Yes     Take 1 tablet by mouth Daily.    metFORMIN (GLUCOPHAGE) 500 MG tablet 7/10/2025  No Yes    TAKE 1 TABLET BY MOUTH EVERY DAY WITH BREAKFAST    metoclopramide (REGLAN) 5 MG tablet   No Yes    TAKE 1 TABLET BY MOUTH 3 (THREE) TIMES A DAY AS NEEDED (FOR NAUSEA AND VOMITING).    mirtazapine (REMERON) 15 MG tablet 7/9/2025  No Yes    TAKE 1 TABLET BY MOUTH EVERYDAY AT BEDTIME    pantoprazole (PROTONIX) 40 MG EC tablet 7/10/2025  Yes Yes    Take 1 tablet by mouth Daily.    rOPINIRole (REQUIP) 0.25 MG tablet 7/9/2025  Yes Yes    Take 1 tablet by mouth Every Night. Take 1 hour before bedtime.    Sertraline HCl 150 MG capsule 7/10/2025  Yes Yes    Take 150 mg by mouth Daily.              Allergies:  No Known Allergies    Objective      Vitals:   Temp:  [97.8 °F (36.6 °C)-99.4 °F (37.4 °C)] 97.8 °F (36.6 °C)  Heart Rate:  [75-80] 78  Resp:  [15-22] 19  BP: (144-165)/(54-72) 144/62  Body mass index is 31.11 kg/m².  Physical Exam  General: Awake, alert, conversational  HEENT, atraumatic normocephalic  Cardio, heart rate normal, rhythm regular  Respiratory: Clear to auscultation on my exam, on 2 L nasal cannula  Abdomen, soft, nontender, no rebound or guarding  Extremities: No remarkable edema in the bilateral extremities  Neuro: Awake alert  conversational, normal speech, moves all extremities    Diagnostic Data:  Lab Results (last 24 hours)       Procedure Component Value Units Date/Time    Lactic Acid, Plasma [[id number]]  (Normal) Collected: 07/10/25 2040    Specimen: Blood Updated: 07/10/25 2106     Lactate 1.3 mmol/L     MRSA Screen, PCR (Inpatient) - Swab, Nares [[id number]]  (Normal) Collected: 07/10/25 1837    Specimen: Swab from Nares Updated: 07/10/25 2014     MRSA PCR No MRSA Detected    Narrative:      The negative predictive value of this diagnostic test is high and should only be used to consider de-escalating anti-MRSA therapy. A positive result may indicate colonization with MRSA and must be correlated  "clinically.    Procalcitonin [[id number]]  (Normal) Collected: 07/10/25 1700    Specimen: Blood Updated: 07/10/25 1923     Procalcitonin 0.09 ng/mL     Narrative:      As a Marker for Sepsis (Non-Neonates):    1. <0.5 ng/mL represents a low risk of severe sepsis and/or septic shock.  2. >2 ng/mL represents a high risk of severe sepsis and/or septic shock.    As a Marker for Lower Respiratory Tract Infections that require antibiotic therapy:    PCT on Admission    Antibiotic Therapy       6-12 Hrs later    >0.5                Strongly Recommended  >0.25 - <0.5        Recommended   0.1 - 0.25          Discouraged              Remeasure/reassess PCT  <0.1                Strongly Discouraged     Remeasure/reassess PCT    As 28 day mortality risk marker: \"Change in Procalcitonin Result\" (>80% or <=80%) if Day 0 (or Day 1) and Day 4 values are available. Refer to http://www.[employer] TechnologiesList of hospitals in the [place]-pct-calculator.com    Change in PCT <=80%  A decrease of PCT levels below or equal to 80% defines a positive change in PCT test result representing a higher risk for 28-day all-cause mortality of patients diagnosed with severe sepsis for septic shock.    Change in PCT >80%  A decrease of PCT levels of more than 80% defines a negative change in PCT result representing a lower risk for 28-day all-cause mortality of patients diagnosed with severe sepsis or septic shock.       Respiratory Panel PCR w/COVID-19(SARS-CoV-2) LESLEE/VIVIANE/PAVAN/PAD/COR/JUAN DAVID In-House, NP Swab in [place], 2 HR TAT - Swab, Nasopharynx [[id number]]  (Normal) Collected: 07/10/25 1753    Specimen: Swab from Nasopharynx Updated: 07/10/25 1907     ADENOVIRUS, PCR Not Detected     Coronavirus 229E Not Detected     Coronavirus HKU1 Not Detected     Coronavirus NL63 Not Detected     Coronavirus OC43 Not Detected     COVID19 Not Detected     Human Metapneumovirus Not Detected     Human Rhinovirus/Enterovirus Not Detected     Influenza A PCR Not Detected     Influenza B PCR Not Detected     " Parainfluenza Virus 1 Not Detected     Parainfluenza Virus 2 Not Detected     Parainfluenza Virus 3 Not Detected     Parainfluenza Virus 4 Not Detected     RSV, PCR Not Detected     Bordetella pertussis pcr Not Detected     Bordetella parapertussis PCR Not Detected     Chlamydophila pneumoniae PCR Not Detected     Mycoplasma pneumo by PCR Not Detected    Narrative:      In the setting of a positive respiratory panel with a viral infection PLUS a negative procalcitonin without other underlying concern for bacterial infection, consider observing off antibiotics or discontinuation of antibiotics and continue supportive care. If the respiratory panel is positive for atypical bacterial infection (Bordetella pertussis, Chlamydophila pneumoniae, or Mycoplasma pneumoniae), consider antibiotic de-escalation to target atypical bacterial infection.    CBC & Differential [[id number]]  (Abnormal) Collected: 07/10/25 1700    Specimen: Blood Updated: 07/10/25 1804    Narrative:      The following orders were created for panel order CBC & Differential.  Procedure                               Abnormality         Status                     ---------                               -----------         ------                     CBC Auto Differential[[id number]]        Abnormal            Final result               Scan Slide[[id number]]                                       Final result                 Please view results for these tests on the individual orders.    Manual Differential [[id number]]  (Abnormal) Collected: 07/10/25 1700    Specimen: Blood Updated: 07/10/25 1804     Neutrophil % 32.0 %      Lymphocyte % 63.0 %      Monocyte % 2.0 %      Bands %  1.0 %      Plasma Cells % 2.0 %      Neutrophils Absolute 6.76 10*3/mm3      Lymphocytes Absolute 12.90 10*3/mm3      Monocytes Absolute 0.41 10*3/mm3      Ovalocytes Slight/1+     Poikilocytes Slight/1+     Smudge Cells Slight/1+     Platelet Morphology Normal    CBC Auto  Differential [[id number]]  (Abnormal) Collected: 07/10/25 1700    Specimen: Blood Updated: 07/10/25 1804     WBC 20.47 10*3/mm3      RBC 3.55 10*6/mm3      Hemoglobin 9.8 g/dL      Hematocrit 33.7 %      MCV 94.9 fL      MCH 27.6 pg      MCHC 29.1 g/dL      RDW 16.0 %      RDW-SD 56.2 fl      MPV 10.7 fL      Platelets 205 10*3/mm3     Narrative:      Instrument flags present, additional testing may be recommended.    Scan Slide [[id number]] Collected: 07/10/25 1700    Specimen: Blood Updated: 07/10/25 1804     Scan Slide --     Comment: See Manual Differential Results       Blood Culture - Blood, Arm, Left [[id number]] Collected: 07/10/25 1754    Specimen: Blood from Arm, Left Updated: 07/10/25 1800    Blood Culture - Blood, Arm, Left [[id number]] Collected: 07/10/25 1754    Specimen: Blood from Arm, Left Updated: 07/10/25 1800    Path Consult Reflex [[id number]] Collected: 07/10/25 1700    Specimen: Blood Updated: 07/10/25 1752     Pathology Review Yes    Narrative:      Absolute lymphocyte    Comprehensive Metabolic Panel [[id number]]  (Abnormal) Collected: 07/10/25 1700    Specimen: Blood Updated: 07/10/25 1740     Glucose 126 mg/dL      BUN 24.9 mg/dL      Creatinine 1.18 mg/dL      Sodium 140 mmol/L      Potassium 4.9 mmol/L      Chloride 106 mmol/L      CO2 21.6 mmol/L      Calcium 9.0 mg/dL      Total Protein 6.2 g/dL      Albumin 3.8 g/dL      ALT (SGPT) 13 U/L      AST (SGOT) 16 U/L      Alkaline Phosphatase 122 U/L      Total Bilirubin 0.2 mg/dL      Globulin 2.4 gm/dL      A/G Ratio 1.6 g/dL      BUN/Creatinine Ratio 21.1     Anion Gap 12.4 mmol/L      eGFR 45.6 mL/min/1.73     Narrative:      GFR Categories in Chronic Kidney Disease (CKD)              GFR Category          GFR (mL/min/1.73)    Interpretation  G1                    90 or greater        Normal or high (1)  G2                    60-89                Mild decrease (1)  G3a                   45-59                Mild to moderate decrease  G3b                    30-44                Moderate to severe decrease  G4                    15-29                Severe decrease  G5                    14 or less           Kidney failure    (1)In the absence of evidence of kidney disease, neither GFR category G1 or G2 fulfill the criteria for CKD.    eGFR calculation 2021 CKD-EPI creatinine equation, which does not include race as a factor    Urinalysis With Culture If Indicated - Urine, Clean Catch [[id number]]  (Abnormal) Collected: 07/10/25 1711    Specimen: Urine, Clean Catch Updated: 07/10/25 1721     Color, UA Yellow     Appearance, UA Clear     pH, UA <=5.0     Specific Gravity, UA 1.015     Glucose, UA Negative     Ketones, UA Trace     Bilirubin, UA Negative     Blood, UA Negative     Protein, UA Negative     Leuk Esterase, UA Negative     Nitrite, UA Negative     Urobilinogen, UA 0.2 E.U./dL    Narrative:      In absence of clinical symptoms, the presence of pyuria, bacteria, and/or nitrites on the urinalysis result does not correlate with infection.  Urine microscopic not indicated.             Imaging Results (Last 24 Hours)       Procedure Component Value Units Date/Time    XR Chest 1 View [[id number]] Collected: 07/10/25 1734     Updated: 07/10/25 1737    Narrative:      XR CHEST 1 VW    Date of Exam: 7/10/2025 5:30 PM EDT    Indication: leukocytosis    Comparison: Chest radiograph 6/24/2025    Findings:      Mediastinum: Cardiac silhouette appears unchanged and mildly enlarged    Lungs: Similar elevated right hemidiaphragm. Mild bibasal patchy opacities.    Pleura: No pleural effusion or pneumothorax.    Bones and soft tissues: No acute, displaced fracture seen.        Impression:      Impression:  Mild bibasal patchy opacities which could represent atelectasis or pneumonia.        Electronically Signed: Osman Franz MD    7/10/2025 5:35 PM EDT    Workstation ID: [id number]              Assessment & Plan    Diane Guan is a 84 y.o. female with a  previous medical history of CLL, CKD stage 3B, DM, HLD, HTN, Hypothyroidism  who presented from rehab to [facility] on 7/10/2025 with cough abnormal labs with leukocytosis.    Assessment:  Possibly pneumonia Vs Atlectasis  Leukocytosis (WBC 20.7) in patient with known CLL  Chronic lymphocytic leukemia (CLL), stable  CKD stage 3B, baseline Cr ~1.8, improved to 1.18  Type 2 diabetes  Hypertension  Hyperlipidemia  Hypothyroidism    Plan:    -Suspected pneumonia vs. atelectasis: Continue empiric antibiotics with cefepime, DC vancomycin given MRSA screen negative.  Start DuoNebs, Pulmicort, supplemental oxygen as needed.    -Leukocytosis: Likely reactive vs. CLL-related.  Her last WBC count during last admission 2 weeks ago was in 24s, likely this is chronic.  Can discuss with the patient and daughter in the morning, should follow-up with her hematologist outpatient.    -CLL: No acute issues identified. Hematology to follow outpatient     -CKD stage 3B: Cr improved from recent baseline. Monitor renal function daily; adjust antibiotics dosing accordingly.    -DM: SSI for diabetes, monitor glucose, adjust insulin regimen as needed.    -HTN: Continue home antihypertensives as tolerated. Monitor BP daily.    -HLD: Continue statin therapy if on prior to admission.    -Hypothyroidism: Continue home levothyroxine.     -Resume remaining home medications once reconciled per pharmacy or medically appropriate.    -Follow AM labs.    VTE Prophylaxis:  Mechanical VTE prophylaxis orders are present.      CODE STATUS:    Code Status (Patient has no pulse and is not breathing): CPR (Attempt to Resuscitate)  Medical Interventions (Patient has pulse or is breathing): Full Support      I discussed the patient's findings and my recommendations with nursing staff.    This document has been electronically signed by Raul Orellana MD on July 11, 2025 01:26 EDT   [facility] Hospitalist Team

## 2025-07-11 NOTE — CASE MANAGEMENT/SOCIAL WORK
Discharge Planning Assessment   [place]ang     Patient Name: Diane Guan  MRN: [id number]  Today's Date: 7/11/2025    Admit Date: 7/10/2025    Plan: DC PLAN: From Routine home with .Current home 02 2L at night(unsure company)  Recent stay at [facility]. PT/OT eval pending.       Discharge Needs Assessment       Row Name 07/11/25 1407       Living Environment    People in Home spouse    Current Living Arrangements home    Potentially Unsafe Housing Conditions none    In the past 12 months has the electric, gas, oil, or water company threatened to shut off services in your home? No    Primary Care Provided by self    Provides Primary Care For no one    Family Caregiver if Needed spouse    Quality of Family Relationships helpful;involved;supportive    Able to Return to Prior Arrangements yes       Resource/Environmental Concerns    Resource/Environmental Concerns none    Transportation Concerns none       Transportation Needs    In the past 12 months, has lack of transportation kept you from medical appointments or from getting medications? no    In the past 12 months, has lack of transportation kept you from meetings, work, or from getting things needed for daily living? No       Food Insecurity    Within the past 12 months, you worried that your food would run out before you got the money to buy more. Never true    Within the past 12 months, the food you bought just didn't last and you didn't have money to get more. Never true       Transition Planning    Patient/Family Anticipates Transition to home with family    Patient/Family Anticipated Services at Transition none    Transportation Anticipated car, drives self;family or friend will provide       Discharge Needs Assessment    Readmission Within the Last 30 Days no previous admission in last 30 days    Equipment Currently Used at Home rollator;walker, rolling;oxygen;hospital bed;shower chair;pulse ox;glucometer    Anticipated Changes Related to Illness  none    Equipment Needed After Discharge none                   Discharge Plan       Row Name 07/11/25 1407       Plan    Plan DC PLAN: From Routine home with .Current home 02 2L at night(unsure company)  Recent stay at [facility]. PT/OT eval pending.    Patient/Family in Agreement with Plan yes    Plan Comments CM Met with patient at bedside, from routine home with . Mostly independent with ADL's prior to admission. Feels below baseline. Recent stay at [facility]. Would need Precert/PASRR if return. PT/OT eval pending. PCP is Bernice, Pharmacy is [facility] in [place]. Agreeable to Meds to Beds. Able to afford medications, denies any issues with food or utilities. Denies any transportation issues. +UTI, IV abx, Pending Clinical Course.                  Continued Care and Services - Admitted Since 7/10/2025       Destination       Service Provider Request Status Services Address Phone Fax Patient Preferred    [facility] Pending - Request Sent -- [address] [facility] [place] [address] [phone] --                           Expected Discharge Date and Time       Expected Discharge Date Expected Discharge Time    Jul 12, 2025            Demographic Summary       Row Name 07/11/25 1406       General Information    Admission Type inpatient    Arrived From emergency department    Required Notices Provided Important Message from Medicare    Referral Source admission list    Reason for Consult discharge planning    Preferred Language English       Contact Information    Permission Granted to Share Info With     Contact Information Obtained for                    Functional Status       Row Name 07/11/25 1406       Functional Status    Usual Activity Tolerance excellent    Current Activity Tolerance excellent       Physical Activity    On average, how many days per week do you engage in moderate to strenuous exercise (like a brisk walk)? 0 days    On  average, how many minutes do you engage in exercise at this level? 0 min    Number of minutes of exercise per week 0       Assessment of Health Literacy    How often do you have someone help you read hospital materials? Sometimes    How often do you have problems learning about your medical condition because of difficulty understanding written information? Sometimes    How often do you have a problem understanding what is told to you about your medical condition? Sometimes    How confident are you filling out medical forms by yourself? Somewhat    Health Literacy Moderate       Functional Status, IADL    Medications independent    Meal Preparation independent    Housekeeping independent    Laundry independent    Shopping independent       Mental Status    General Appearance WDL WDL       Mental Status Summary    Recent Changes in Mental Status/Cognitive Functioning no changes                          Patient Forms       Row Name 07/11/25 1355       Patient Forms    Important Message from Medicare (IMM) Delivered  IMM 7/10/25 per registration    Delivered to Patient    Method of delivery In person                Met with patient in room wearing PPE:    Maintained distance greater than six feet and spent less than 15 minutes in the room.    Brie Castorena RN    Case Management  [phone]

## 2025-07-11 NOTE — PLAN OF CARE
Problem: Adult Inpatient Plan of Care  Goal: Absence of Hospital-Acquired Illness or Injury  Intervention: Identify and Manage Fall Risk  Recent Flowsheet Documentation  Taken 7/11/2025 0412 by Warren Kovacs RN  Safety Promotion/Fall Prevention:   safety round/check completed   room organization consistent   nonskid shoes/slippers when out of bed   fall prevention program maintained   clutter free environment maintained   assistive device/personal items within reach  Taken 7/11/2025 0200 by Warren Kovacs RN  Safety Promotion/Fall Prevention:   safety round/check completed   room organization consistent   nonskid shoes/slippers when out of bed   fall prevention program maintained   clutter free environment maintained   assistive device/personal items within reach  Taken 7/11/2025 0000 by Warren Kovacs RN  Safety Promotion/Fall Prevention:   safety round/check completed   room organization consistent   nonskid shoes/slippers when out of bed   fall prevention program maintained   clutter free environment maintained   assistive device/personal items within reach  Taken 7/10/2025 2200 by Warren Kovacs RN  Safety Promotion/Fall Prevention:   safety round/check completed   room organization consistent   nonskid shoes/slippers when out of bed   fall prevention program maintained   clutter free environment maintained   assistive device/personal items within reach  Intervention: Prevent Skin Injury  Recent Flowsheet Documentation  Taken 7/11/2025 0412 by Warren Kovacs RN  Body Position: position changed independently  Skin Protection: incontinence pads utilized  Taken 7/11/2025 0000 by Warren Kovacs RN  Body Position: position changed independently  Skin Protection: incontinence pads utilized  Intervention: Prevent Infection  Recent Flowsheet Documentation  Taken 7/11/2025 0412 by Warren Kovacs RN  Infection Prevention:   environmental surveillance performed   hand hygiene promoted   personal  protective equipment utilized   rest/sleep promoted   single patient room provided  Taken 7/11/2025 0000 by Warren Kovacs RN  Infection Prevention:   environmental surveillance performed   hand hygiene promoted   personal protective equipment utilized   rest/sleep promoted   single patient room provided     Problem: Pneumonia  Goal: Effective Oxygenation and Ventilation  Intervention: Optimize Oxygenation and Ventilation  Recent Flowsheet Documentation  Taken 7/11/2025 0412 by Warren Kovacs RN  Head of Bed (HOB) Positioning: HOB elevated  Taken 7/11/2025 0000 by Warren Kovacs RN  Head of Bed (HOB) Positioning: HOB elevated     Problem: Fall Injury Risk  Goal: Absence of Fall and Fall-Related Injury  Intervention: Identify and Manage Contributors  Recent Flowsheet Documentation  Taken 7/11/2025 0412 by Warren Kovacs RN  Medication Review/Management: medications reviewed  Intervention: Promote Injury-Free Environment  Recent Flowsheet Documentation  Taken 7/11/2025 0412 by Warren Kovacs RN  Safety Promotion/Fall Prevention:   safety round/check completed   room organization consistent   nonskid shoes/slippers when out of bed   fall prevention program maintained   clutter free environment maintained   assistive device/personal items within reach  Taken 7/11/2025 0200 by Warren Kovacs RN  Safety Promotion/Fall Prevention:   safety round/check completed   room organization consistent   nonskid shoes/slippers when out of bed   fall prevention program maintained   clutter free environment maintained   assistive device/personal items within reach  Taken 7/11/2025 0000 by Warren Kovacs RN  Safety Promotion/Fall Prevention:   safety round/check completed   room organization consistent   nonskid shoes/slippers when out of bed   fall prevention program maintained   clutter free environment maintained   assistive device/personal items within reach  Taken 7/10/2025 2200 by Warren Kovacs, LUCINA  Safety  Promotion/Fall Prevention:   safety round/check completed   room organization consistent   nonskid shoes/slippers when out of bed   fall prevention program maintained   clutter free environment maintained   assistive device/personal items within reach   Goal Outcome Evaluation:      Patient is receiving IV antibiotics. Patient has been pleasant, calm and cooperative with care.

## 2025-07-11 NOTE — DISCHARGE PLACEMENT REQUEST
"Diane Apple (85 y.o. Female)       Date of Birth   1940    Social Security Number       Address   [address] [place] [address] [address] [facility] [place] [facility]    Home Phone   [phone]    MRN   [id number]       Taoist   Non-Jain    Marital Status                               Admission Date   7/10/2025    Admission Type   Emergency    Admitting Provider   Hiram Lubin MD    Attending Provider   Yves Salas MD    Department, Room/Bed   [facility] 2D, 264/1       Discharge Date       Discharge Disposition       Discharge Destination                                 Attending Provider: Yves Salas MD    Allergies: No Known Allergies    Isolation: None   Infection: None   Code Status: CPR    Ht: 162.6 cm (64\")   Wt: 82.2 kg (181 lb 3.5 oz)    Admission Cmt: None   Principal Problem: None                  Active Insurance as of 7/10/2025       Primary Coverage       Payor Plan Insurance Group Employer/Plan Group    HUMANA MEDICARE REPLACEMENT HUMANA MEDICARE ADVANTAGE PPO [id number]       Payor Plan Address Payor Plan Phone Number Payor Plan Fax Number Effective Dates    [address] [address] [phone]  1/1/2023 - None Entered    [facility] [id number]         Subscriber Name Subscriber Birth Date Member ID       DIANE APPLE 1940 [id number]                     Emergency Contacts        (Rel.) Home Phone Work Phone Mobile Phone    JESUS SLAUGHTER (Daughter) [phone] -- --    Jose Apple (Son) -- -- [phone]                "

## 2025-07-11 NOTE — PROGRESS NOTES
.    [facility] MEDICINE SERVICE  DAILY PROGRESS NOTE    NAME: Diane Guan  : 1940  MRN: [id number]      LOS: 1 day     PROVIDER OF SERVICE: Yves Salas MD    Chief Complaint: <principal problem not specified>    Subjective:     Interval History:  History taken from: patient    Seen and examined at bedside, daughter also present.  Patient still nauseated and had an episode of vomiting some mucus.        Review of Systems:   Review of Systems negative except as mentioned above    Objective:     Vital Signs  Temp:  [97.8 °F (36.6 °C)-99.4 °F (37.4 °C)] 98 °F (36.7 °C)  Heart Rate:  [69-80] 69  Resp:  [15-22] 18  BP: (144-165)/(54-72) 147/61  Flow (L/min) (Oxygen Therapy):  [2-4] 4   Body mass index is 31.11 kg/m².    Physical Exam  Physical Exam  General: Awake, alert, conversational  HEENT, atraumatic normocephalic  Cardio, heart rate normal, rhythm regular  Respiratory: Clear to auscultation on my exam, on 2 L nasal cannula  Abdomen, soft, nontender, no rebound or guarding  Extremities: No remarkable edema in the bilateral extremities  Neuro: Awake alert  conversational, normal speech, moves all extremities     Diagnostic Data    Results from last 7 days   Lab Units 25  0315   WBC 10*3/mm3 18.96*   HEMOGLOBIN g/dL 9.3*   HEMATOCRIT % 32.4*   PLATELETS 10*3/mm3 177   GLUCOSE mg/dL 111*   CREATININE mg/dL 1.01*   BUN mg/dL 24.6*   SODIUM mmol/L 141   POTASSIUM mmol/L 4.8   AST (SGOT) U/L 15   ALT (SGPT) U/L 12   ALK PHOS U/L 115   BILIRUBIN mg/dL <0.2   ANION GAP mmol/L 9.6       XR Chest 1 View  Result Date: 7/10/2025  Impression: Mild bibasal patchy opacities which could represent atelectasis or pneumonia. Electronically Signed: Osman Franz MD  7/10/2025 5:35 PM EDT  Workstation ID: [id number]        I reviewed the patient's new clinical results.    Assessment/Plan:     Active and Resolved Problems  Active Hospital Problems    Diagnosis  POA    Pneumonia [J18.9]  Yes      Resolved  Hospital Problems   No resolved problems to display.       Possibly pneumonia Vs Atlectasis  Leukocytosis (WBC 20.7) in patient with known CLL  Chronic lymphocytic leukemia (CLL), stable  CKD stage 3B, baseline Cr ~1.8, improved to 1.18  Type 2 diabetes  Hypertension  Hyperlipidemia  Hypothyroidism    Plan:    -Suspected pneumonia vs. atelectasis: Continue empiric antibiotics with ceftriaxone, DC vancomycin given MRSA screen negative.  Start DuoNebs, Pulmicort, supplemental oxygen as needed.   -Ordered CT chest    -Leukocytosis: Likely reactive vs. CLL-related.  Her last WBC count during last admission 2 weeks ago was in 24s, likely this is chronic.      -CLL: No acute issues identified. Hematology to follow outpatient     -CKD stage 3B: Cr improved from recent baseline. Monitor renal function daily; adjust antibiotics dosing accordingly.    -DM: SSI for diabetes, monitor glucose, adjust insulin regimen as needed.    -HTN: Continue home antihypertensives as tolerated. Monitor BP daily.    -HLD: chronic    -Hypothyroidism: Continue home levothyroxine.     -Resume remaining home medications     VTE Prophylaxis:  Pharmacologic & mechanical VTE prophylaxis orders are present.             Disposition Planning:     Barriers to Discharge: Treatment for pneumonia  Anticipated Date of Discharge: 7/13  Place of Discharge: Home versus SNF      Time: 30 minutes     Code Status and Medical Interventions: CPR (Attempt to Resuscitate); Full Support   Ordered at: 07/11/25 0126     Code Status (Patient has no pulse and is not breathing):    CPR (Attempt to Resuscitate)     Medical Interventions (Patient has pulse or is breathing):    Full Support       Signature: Electronically signed by Yves Salas MD, 07/11/25, 13:14 EDT.  [facility] Hospitalist Team

## 2025-07-11 NOTE — CASE MANAGEMENT/SOCIAL WORK
Case Management Readmission Assessment Note    Case Management Readmission Assessment (all recorded)       Readmission Interview       Row Name 07/11/25 1401             Readmission Indications    Is the patient and/or family able to complete the readmission assessment questions? Yes      Is this hospitalization related to the prior hospital diagnosis? Yes        Row Name 07/11/25 1401             Recommendation for rehospitalization    Did you speak with your physician prior to coming to the hospital No        Row Name 07/11/25 1401             Follow-up Appointments    Do you have a PCP? Yes      Did you have an appointment with PCP after your hospitalization? No  First appointment of 9/12/25      Did you have an appointment with a Specialist? Yes      When was your appointment scheduled? 09/25/25  Dr. Bender      Did you go to appointment? No      Are you current with the Pulmonary Clinic? No      Are you current with the CHF Clinic? No        Row Name 07/11/25 1401             Medications    Did you have newly prescribed medications at discharge? Yes      Did you understand the reasons for your medications at discharge and how to take them? Yes      Did you understand the side effects of your medications? Yes      Are you taking all of you prescribed medications? Yes      Were medications picked up? Yes        Row Name 07/11/25 1401             Discharge Instructions    Did you understand your discharge instructions? Yes      Did your family/caregiver hear your instructions? Yes      Were you told to eat a special diet? No      Did you adhere to the diet? No      Were you given a number of someone to call if you had questions or concerns? Yes        Row Name 07/11/25 1401             Index discharge location/services    Where did you go upon discharge? Skilled Nursing Facility      Do you have supportive family or friends in the home? No      Was the provider seen at the facility? Yes      Which Skilled Nursing  Facility were your admitted from? PREVIOUS ADMISSION: 6/24/25- medical history significant for several types of cancer, diabetes, blood clots, hyperlipidemia, and hypertension presenting to the ED for evaluation of increased frequency of falls x 1 week. + UTI. Discharged 6/27/25 to [facility].   CURRENT ADMISSION: Patient is an 85-year-old female presents to the ED for evaluation of abnormal lab work. Patient was released from [facility] today. She was admitted for UTI, was receiving IV antibiotics, she had lab work that revealed a leukocytosis.        Row Name 07/11/25 1401             Discharge Readiness    On a scale of 1-5 (5 being well prepared), how ready were you for discharge 3      Recommendation based on interview Education on diagnosis/self management        Row Name 07/11/25 1401             Palliative Care/Hospice    Are you current with Palliative Care? No      Are you current with Hospice Care? No        Row Name 07/11/25 1401 07/10/25 2114          Advance Directives (For Healthcare)    Pre-existing AND/MOST/POLST Order No No     Advance Directive Status -- Patient does not have advance directive     Have you reviewed your Advance Directive and is it valid for this stay? No Not applicable     Literature Provided on Advance Directives No No     Patient Requests Assistance on Advance Directives -- Patient Declined       Row Name 07/11/25 1401             Readmission Assessment Final Comments    Final Comments PREVIOUS ADMISSION: 6/24/25- medical history significant for several types of cancer, diabetes, blood clots, hyperlipidemia, and hypertension presenting to the ED for evaluation of increased frequency of falls x 1 week. + UTI. Discharged 6/27/25 to [facility].   CURRENT ADMISSION: Patient is an 85-year-old female presents to the ED for evaluation of abnormal lab work. Patient was released from [facility] today. She was admitted for UTI, was receiving IV  antibiotics, she had lab work that revealed a leukocytosis.

## 2025-07-12 LAB
GLUCOSE BLDC GLUCOMTR-MCNC: 110 MG/DL (ref 70–105)
GLUCOSE BLDC GLUCOMTR-MCNC: 120 MG/DL (ref 70–105)
GLUCOSE BLDC GLUCOMTR-MCNC: 125 MG/DL (ref 70–105)
GLUCOSE BLDC GLUCOMTR-MCNC: 150 MG/DL (ref 70–105)

## 2025-07-12 PROCEDURE — 82948 REAGENT STRIP/BLOOD GLUCOSE: CPT

## 2025-07-12 PROCEDURE — 94799 UNLISTED PULMONARY SVC/PX: CPT

## 2025-07-12 PROCEDURE — 94664 DEMO&/EVAL PT USE INHALER: CPT

## 2025-07-12 PROCEDURE — 82948 REAGENT STRIP/BLOOD GLUCOSE: CPT | Performed by: STUDENT IN AN ORGANIZED HEALTH CARE EDUCATION/TRAINING PROGRAM

## 2025-07-12 PROCEDURE — 25010000002 CEFTRIAXONE PER 250 MG

## 2025-07-12 PROCEDURE — 94761 N-INVAS EAR/PLS OXIMETRY MLT: CPT

## 2025-07-12 RX ADMIN — BUDESONIDE 0.5 MG: 0.5 SUSPENSION RESPIRATORY (INHALATION) at 19:56

## 2025-07-12 RX ADMIN — AMLODIPINE BESYLATE 10 MG: 5 TABLET ORAL at 08:35

## 2025-07-12 RX ADMIN — CHLORTHALIDONE 25 MG: 25 TABLET ORAL at 08:35

## 2025-07-12 RX ADMIN — ATORVASTATIN CALCIUM 40 MG: 40 TABLET, FILM COATED ORAL at 20:16

## 2025-07-12 RX ADMIN — APIXABAN 2.5 MG: 2.5 TABLET, FILM COATED ORAL at 20:16

## 2025-07-12 RX ADMIN — APIXABAN 2.5 MG: 2.5 TABLET, FILM COATED ORAL at 08:36

## 2025-07-12 RX ADMIN — HYDROCODONE BITARTRATE AND ACETAMINOPHEN 1 TABLET: 5; 325 TABLET ORAL at 08:40

## 2025-07-12 RX ADMIN — IPRATROPIUM BROMIDE AND ALBUTEROL SULFATE 3 ML: .5; 3 SOLUTION RESPIRATORY (INHALATION) at 12:00

## 2025-07-12 RX ADMIN — Medication 10 ML: at 08:36

## 2025-07-12 RX ADMIN — PANTOPRAZOLE SODIUM 40 MG: 40 TABLET, DELAYED RELEASE ORAL at 08:35

## 2025-07-12 RX ADMIN — Medication 10 ML: at 20:18

## 2025-07-12 RX ADMIN — CEFTRIAXONE 2000 MG: 2 INJECTION, POWDER, FOR SOLUTION INTRAMUSCULAR; INTRAVENOUS at 16:09

## 2025-07-12 RX ADMIN — LEVOTHYROXINE SODIUM 100 MCG: 100 TABLET ORAL at 06:22

## 2025-07-12 RX ADMIN — MIRTAZAPINE 15 MG: 15 TABLET, FILM COATED ORAL at 20:16

## 2025-07-12 RX ADMIN — IPRATROPIUM BROMIDE AND ALBUTEROL SULFATE 3 ML: .5; 3 SOLUTION RESPIRATORY (INHALATION) at 19:52

## 2025-07-12 RX ADMIN — IPRATROPIUM BROMIDE AND ALBUTEROL SULFATE 3 ML: .5; 3 SOLUTION RESPIRATORY (INHALATION) at 15:14

## 2025-07-12 RX ADMIN — ROPINIROLE HYDROCHLORIDE 0.25 MG: 0.25 TABLET, FILM COATED ORAL at 20:16

## 2025-07-12 RX ADMIN — SERTRALINE HYDROCHLORIDE 25 MG: 25 TABLET, FILM COATED ORAL at 08:36

## 2025-07-12 NOTE — PLAN OF CARE
Goal Outcome Evaluation:  Plan of Care Reviewed With: patient        Progress: no change  Outcome Evaluation: Patient is one assist to bathroom. Patient is on 4L of O2. Daughter at bedside. No c/o noted at this time.

## 2025-07-12 NOTE — PLAN OF CARE
Goal Outcome Evaluation:         Denies any discomfort. BS within normal this time

## 2025-07-12 NOTE — PROGRESS NOTES
.    [facility] MEDICINE SERVICE  DAILY PROGRESS NOTE    NAME: Diane Guan  : 1940  MRN: [id number]      LOS: 2 days     PROVIDER OF SERVICE: Yves Salas MD    Chief Complaint: <principal problem not specified>    Subjective:     Interval History:  History taken from: patient    Seen and examined at bedside, daughter also present.  Patient says she feels a little better today.  Has some return in her appetite.  Discussed CT chest findings with them.        Review of Systems:   Review of Systems negative except as mentioned above    Objective:     Vital Signs  Temp:  [97.3 °F (36.3 °C)-98 °F (36.7 °C)] 97.8 °F (36.6 °C)  Heart Rate:  [71-81] 72  Resp:  [18-26] 24  BP: (138-157)/(54-74) 153/74  Flow (L/min) (Oxygen Therapy):  [4] 4   Body mass index is 31.11 kg/m².    Physical Exam  Physical Exam  General: Awake, alert, conversational  HEENT, atraumatic normocephalic  Cardio, heart rate normal, rhythm regular  Respiratory: Clear to auscultation on my exam, on 2 L nasal cannula  Abdomen, soft, nontender, no rebound or guarding  Extremities: No remarkable edema in the bilateral extremities  Neuro: Awake alert  conversational, normal speech, moves all extremities     Diagnostic Data    Results from last 7 days   Lab Units 25  0315   WBC 10*3/mm3 18.96*   HEMOGLOBIN g/dL 9.3*   HEMATOCRIT % 32.4*   PLATELETS 10*3/mm3 177   GLUCOSE mg/dL 111*   CREATININE mg/dL 1.01*   BUN mg/dL 24.6*   SODIUM mmol/L 141   POTASSIUM mmol/L 4.8   AST (SGOT) U/L 15   ALT (SGPT) U/L 12   ALK PHOS U/L 115   BILIRUBIN mg/dL <0.2   ANION GAP mmol/L 9.6       CT Chest Without Contrast Diagnostic  Result Date: 2025  Impression: CT scan of the chest without IV contrast demonstrating subsegmental atelectasis and/or linear fibrosis at the lung bases, stable. Coronary artery calcifications are seen. Electronically Signed: Rene Ramirez MD  2025 3:38 PM EDT  Workstation ID: [id number]    XR Chest 1 View  Result Date:  7/10/2025  Impression: Mild bibasal patchy opacities which could represent atelectasis or pneumonia. Electronically Signed: Osman Franz MD  7/10/2025 5:35 PM EDT  Workstation ID: [id number]        I reviewed the patient's new clinical results.    Assessment/Plan:     Active and Resolved Problems  Active Hospital Problems    Diagnosis  POA    Pneumonia [J18.9]  Yes      Resolved Hospital Problems   No resolved problems to display.       Atlectasis  Leukocytosis (WBC 20.7) in patient with known CLL  Chronic lymphocytic leukemia (CLL), stable  CKD stage 3B, baseline Cr ~1.8, improved to 1.18  Type 2 diabetes  Hypertension  Hyperlipidemia  Hypothyroidism    Plan:    -Continue empiric antibiotics with ceftriaxone, DC vancomycin given MRSA screen negative.  Start DuoNebs, Pulmicort, supplemental oxygen as needed.   -Ordered CT chest and discussed results with patient and daughter.    -Leukocytosis: Likely reactive vs. CLL-related.  Her last WBC count during last admission 2 weeks ago was in 24s, likely this is chronic.      -CLL: No acute issues identified. Hematology to follow outpatient     -CKD stage 3B: Cr improved from recent baseline. Monitor renal function daily; adjust antibiotics dosing accordingly.    -DM: SSI for diabetes, monitor glucose, adjust insulin regimen as needed.    -HTN: Continue home antihypertensives as tolerated. Monitor BP daily.    -HLD: chronic    -Hypothyroidism: Continue home levothyroxine.     -Resume remaining home medications     VTE Prophylaxis:  Pharmacologic & mechanical VTE prophylaxis orders are present.             Disposition Planning:     Barriers to Discharge: Pending blood culture  Anticipated Date of Discharge: 7/13  Place of Discharge: Home versus SNF      Time: 30 minutes     Code Status and Medical Interventions: CPR (Attempt to Resuscitate); Full Support   Ordered at: 07/11/25 0126     Code Status (Patient has no pulse and is not breathing):    CPR (Attempt to  Resuscitate)     Medical Interventions (Patient has pulse or is breathing):    Full Support       Signature: Electronically signed by Yves Salas MD, 07/12/25, 12:26 EDT.  [facility] Hospitalist Team

## 2025-07-13 LAB
ALBUMIN SERPL-MCNC: 3.8 G/DL (ref 3.5–5.2)
ALBUMIN/GLOB SERPL: 1.5 G/DL
ALP SERPL-CCNC: 117 U/L (ref 39–117)
ALT SERPL W P-5'-P-CCNC: 13 U/L (ref 1–33)
ANION GAP SERPL CALCULATED.3IONS-SCNC: 9.7 MMOL/L (ref 5–15)
AST SERPL-CCNC: 17 U/L (ref 1–32)
BILIRUB SERPL-MCNC: <0.2 MG/DL (ref 0–1.2)
BUN SERPL-MCNC: 20.1 MG/DL (ref 8–23)
BUN/CREAT SERPL: 19.5 (ref 7–25)
CALCIUM SPEC-SCNC: 9.4 MG/DL (ref 8.6–10.5)
CHLORIDE SERPL-SCNC: 108 MMOL/L (ref 98–107)
CO2 SERPL-SCNC: 24.3 MMOL/L (ref 22–29)
CREAT SERPL-MCNC: 1.03 MG/DL (ref 0.57–1)
DEPRECATED RDW RBC AUTO: 54.3 FL (ref 37–54)
EGFRCR SERPLBLD CKD-EPI 2021: 53.7 ML/MIN/1.73
ERYTHROCYTE [DISTWIDTH] IN BLOOD BY AUTOMATED COUNT: 15.7 % (ref 12.3–15.4)
GLOBULIN UR ELPH-MCNC: 2.5 GM/DL
GLUCOSE BLDC GLUCOMTR-MCNC: 111 MG/DL (ref 70–105)
GLUCOSE BLDC GLUCOMTR-MCNC: 115 MG/DL (ref 70–105)
GLUCOSE BLDC GLUCOMTR-MCNC: 135 MG/DL (ref 70–105)
GLUCOSE BLDC GLUCOMTR-MCNC: 137 MG/DL (ref 70–105)
GLUCOSE SERPL-MCNC: 111 MG/DL (ref 65–99)
HCT VFR BLD AUTO: 34.8 % (ref 34–46.6)
HGB BLD-MCNC: 10.2 G/DL (ref 12–15.9)
MCH RBC QN AUTO: 27.6 PG (ref 26.6–33)
MCHC RBC AUTO-ENTMCNC: 29.3 G/DL (ref 31.5–35.7)
MCV RBC AUTO: 94.1 FL (ref 79–97)
PLATELET # BLD AUTO: 173 10*3/MM3 (ref 140–450)
PMV BLD AUTO: 11 FL (ref 6–12)
POTASSIUM SERPL-SCNC: 4.8 MMOL/L (ref 3.5–5.2)
PROT SERPL-MCNC: 6.3 G/DL (ref 6–8.5)
RBC # BLD AUTO: 3.7 10*6/MM3 (ref 3.77–5.28)
SODIUM SERPL-SCNC: 142 MMOL/L (ref 136–145)
WBC NRBC COR # BLD AUTO: 15.93 10*3/MM3 (ref 3.4–10.8)

## 2025-07-13 PROCEDURE — 82948 REAGENT STRIP/BLOOD GLUCOSE: CPT | Performed by: STUDENT IN AN ORGANIZED HEALTH CARE EDUCATION/TRAINING PROGRAM

## 2025-07-13 PROCEDURE — 25010000002 CEFTRIAXONE PER 250 MG

## 2025-07-13 PROCEDURE — 82948 REAGENT STRIP/BLOOD GLUCOSE: CPT

## 2025-07-13 PROCEDURE — 94799 UNLISTED PULMONARY SVC/PX: CPT

## 2025-07-13 PROCEDURE — 85027 COMPLETE CBC AUTOMATED: CPT

## 2025-07-13 PROCEDURE — 97166 OT EVAL MOD COMPLEX 45 MIN: CPT | Performed by: OCCUPATIONAL THERAPIST

## 2025-07-13 PROCEDURE — 97162 PT EVAL MOD COMPLEX 30 MIN: CPT

## 2025-07-13 PROCEDURE — 94761 N-INVAS EAR/PLS OXIMETRY MLT: CPT

## 2025-07-13 PROCEDURE — 80053 COMPREHEN METABOLIC PANEL: CPT

## 2025-07-13 RX ORDER — BUDESONIDE 0.5 MG/2ML
0.5 INHALANT ORAL ONCE
Status: DISCONTINUED | OUTPATIENT
Start: 2025-07-13 | End: 2025-07-15 | Stop reason: HOSPADM

## 2025-07-13 RX ADMIN — SENNOSIDES AND DOCUSATE SODIUM 2 TABLET: 50; 8.6 TABLET ORAL at 20:34

## 2025-07-13 RX ADMIN — ATORVASTATIN CALCIUM 40 MG: 40 TABLET, FILM COATED ORAL at 20:34

## 2025-07-13 RX ADMIN — ROPINIROLE HYDROCHLORIDE 0.25 MG: 0.25 TABLET, FILM COATED ORAL at 20:34

## 2025-07-13 RX ADMIN — MIRTAZAPINE 15 MG: 15 TABLET, FILM COATED ORAL at 20:34

## 2025-07-13 RX ADMIN — SERTRALINE HYDROCHLORIDE 25 MG: 25 TABLET, FILM COATED ORAL at 08:53

## 2025-07-13 RX ADMIN — PANTOPRAZOLE SODIUM 40 MG: 40 TABLET, DELAYED RELEASE ORAL at 08:54

## 2025-07-13 RX ADMIN — CHLORTHALIDONE 25 MG: 25 TABLET ORAL at 08:53

## 2025-07-13 RX ADMIN — CEFTRIAXONE 2000 MG: 2 INJECTION, POWDER, FOR SOLUTION INTRAMUSCULAR; INTRAVENOUS at 13:46

## 2025-07-13 RX ADMIN — Medication 10 ML: at 20:35

## 2025-07-13 RX ADMIN — IPRATROPIUM BROMIDE AND ALBUTEROL SULFATE 3 ML: .5; 3 SOLUTION RESPIRATORY (INHALATION) at 15:30

## 2025-07-13 RX ADMIN — IPRATROPIUM BROMIDE AND ALBUTEROL SULFATE 3 ML: .5; 3 SOLUTION RESPIRATORY (INHALATION) at 18:45

## 2025-07-13 RX ADMIN — LEVOTHYROXINE SODIUM 100 MCG: 100 TABLET ORAL at 06:14

## 2025-07-13 RX ADMIN — APIXABAN 2.5 MG: 2.5 TABLET, FILM COATED ORAL at 20:34

## 2025-07-13 RX ADMIN — AMLODIPINE BESYLATE 10 MG: 5 TABLET ORAL at 08:53

## 2025-07-13 RX ADMIN — BUDESONIDE 0.5 MG: 0.5 SUSPENSION RESPIRATORY (INHALATION) at 18:50

## 2025-07-13 RX ADMIN — Medication 10 ML: at 08:54

## 2025-07-13 RX ADMIN — APIXABAN 2.5 MG: 2.5 TABLET, FILM COATED ORAL at 08:53

## 2025-07-13 NOTE — THERAPY EVALUATION
Patient Name: Diane Guan  : 1940    MRN: [id number]                              Today's Date: 2025       Admit Date: 7/10/2025    Visit Dx:     ICD-10-CM ICD-9-CM   1. Pneumonia due to infectious organism, unspecified laterality, unspecified part of lung  J18.9 486   2. 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    Acquired hypothyroidism    Type 2 diabetes mellitus with stage 3b chronic kidney disease, without long-term current use of insulin    Insomnia    Postmenopausal status    Shoulder pain    Overactive bladder    Vitamin B 12 deficiency    Seasonal allergies    Absolute anemia    Vitamin D deficiency    LIBBY (obstructive sleep apnea)    Nocturnal hypoxia    Acute respiratory failure with hypoxia and hypercapnia    Abdominal pain    COPD (chronic obstructive pulmonary disease)    CKD (chronic kidney disease) stage 3, GFR 30-59 ml/min    Sepsis due to pneumonia    Acute on chronic respiratory failure with hypoxemia    Class 1 obesity due to excess calories with serious comorbidity and body mass index (BMI) of 32.0 to 32.9 in adult    Acute hypoxemic respiratory failure    History of breast cancer    Personal history of CLL (chronic lymphocytic leukemia)    History of cigarette smoking    Fall at home    Rhinovirus infection    Acute respiratory failure with hypoxia    Hyperkalemia    Respiratory failure    Leukocytosis    Iron deficiency anemia    Malabsorption of iron    UTI (urinary tract infection)    Pneumonia     Past Medical History:   Diagnosis Date    Acute bronchitis due to human metapneumovirus 2020     Anxiety disorder     Arthritis     Arthritis of back     Breast cancer 02/2019    Invasive ductal carcinoma    Chronic lymphocytic leukemia (CLL), B-cell 01/2019    Depression     Diabetes mellitus     Disease of thyroid gland     Diverticulosis of colon 2018    Identified on colonoscopy    DVT (deep venous thrombosis)     Dysphagia 12/2020    Dyspnea on exertion     Fall at home 10/11/2023    Hemorrhoid     Hiatal hernia 12/2020    Hiatal hernia with GERD     large hiatal hernia with high-grade reflux on barium swallow; s/p laparoscopic fundoplication with gastropexy    History of breast cancer 10/11/2023    History of cigarette smoking 10/11/2023    History of diabetes mellitus     no meds now    HL (hearing loss)     Hyperlipidemia     Hypertension     Knee swelling     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    aspiration pneumonia; completed abx therapy    OAB (overactive bladder)     Personal history of CLL (chronic lymphocytic leukemia) 10/11/2023    Pulmonary emboli     Skin cancer     Sleep apnea     daughter states pt does not have and doesn't have machine     Past Surgical History:   Procedure Laterality Date    BLADDER SURGERY      BREAST BIOPSY      BRONCHOSCOPY N/A 09/13/2019    Procedure: BRONCHOSCOPY with bronchial washing;  Surgeon: Marnie Frankel MD;  Location: [facility] ENDOSCOPY;  Service: Pulmonary    BRONCHOSCOPY Bilateral 10/18/2023    Procedure: BRONCHOSCOPY AT BEDSIDE;  Surgeon: Vinicius Heredia DO;  Location: [facility] ENDOSCOPY;  Service: Pulmonary;  Laterality: Bilateral;    COLONOSCOPY  07/19/2018    severe diverticulosis    CYST REMOVAL      Removed a fatty cyst off of her back    ENDOSCOPY N/A 11/23/2020    Procedure: ESOPHAGOGASTRODUODENOSCOPY with dilatation (Bougie # 48, 50, 52, 54, 56, 58);  Surgeon: Den Plummer DO;  Location: [facility] ENDOSCOPY;  Service: General;  Laterality: N/A;  Post: large hiatal hernia, joshua's ulcers    EYE SURGERY      HERNIA REPAIR      HIATAL HERNIA REPAIR  N/A 12/30/2020    Procedure: Laparoscopic hiatal hernia repair with gastropexy;  Surgeon: Den Plummer DO;  Location: [facility] MAIN OR;  Service: General;  Laterality: N/A;    MASTECTOMY Right 02/04/2019    Invasive ductal carcinoma    TUBAL ABDOMINAL LIGATION        General Information       Row Name 07/13/25 1848          Physical Therapy Time and Intention    Document Type evaluation  -EL     Mode of Treatment physical therapy  -       Row Name 07/13/25 1848          General Information    Prior Level of Function independent:;all household mobility;ADL's  -EL     Existing Precautions/Restrictions fall;oxygen therapy device and L/min  -EL       Row Name 07/13/25 1848          Living Environment    Current Living Arrangements other (see comments)  From home with spouse initially, butplans to stay with daughter at d/c  -EL       Row Name 07/13/25 1848          Cognition    Orientation Status (Cognition) oriented x 4  -EL       Row Name 07/13/25 1848          Safety Issues/Impairments Affecting Functional Mobility    Impairments Affecting Function (Mobility) balance;endurance/activity tolerance;shortness of breath;strength  -EL               User Key  (r) = Recorded By, (t) = Taken By, (c) = Cosigned By      Initials Name Provider Type    EL Chencho Rojas, PT Physical Therapist                   Mobility       Row Name 07/13/25 1851          Bed Mobility    Bed Mobility bed mobility (all) activities  -EL     All Activities, [place] (Bed Mobility) contact guard  -EL       Row Name 07/13/25 1851          Bed-Chair Transfer    Bed-Chair [place] (Transfers) contact guard  -EL     Assistive Device (Bed-Chair Transfers) walker, front-wheeled  -EL       Row Name 07/13/25 1851          Sit-Stand Transfer    Sit-Stand [place] (Transfers) minimum assist (75% patient effort)  -EL     Assistive Device (Sit-Stand Transfers) walker, front-wheeled  -EL       Row Name 07/13/25 1851          Gait/Stairs (Locomotion)     [place] Level (Gait) minimum assist (75% patient effort)  -EL     Assistive Device (Gait) walker, front-wheeled  -EL     Patient was able to Ambulate yes  -EL     Distance in Feet (Gait) 15  -EL     Deviations/Abnormal Patterns (Gait) gait speed decreased  -EL     Bilateral Gait Deviations forward flexed posture  -EL     Comment, (Gait/Stairs) reliant on RWx  -EL               User Key  (r) = Recorded By, (t) = Taken By, (c) = Cosigned By      Initials Name Provider Type    Chencho Polk PT Physical Therapist                   Obj/Interventions       Row Name 07/13/25 1851          Range of Motion Comprehensive    General Range of Motion bilateral lower extremity ROM WFL  -EL       Row Name 07/13/25 1851          Strength Comprehensive (MMT)    General Manual Muscle Testing (MMT) Assessment lower extremity strength deficits identified  -EL     Comment, General Manual Muscle Testing (MMT) Assessment BLE 4-/5 gross  -EL       Row Name 07/13/25 1851          Sensory Assessment (Somatosensory)    Sensory Assessment (Somatosensory) sensation intact  -EL               User Key  (r) = Recorded By, (t) = Taken By, (c) = Cosigned By      Initials Name Provider Type    Chencho Polk, PT Physical Therapist                   Goals/Plan       Row Name 07/13/25 1855          Bed Mobility Goal 1 (PT)    Activity/Assistive Device (Bed Mobility Goal 1, PT) bed mobility activities, all  -EL     [place] Level/Cues Needed (Bed Mobility Goal 1, PT) modified independence  -EL     Time Frame (Bed Mobility Goal 1, PT) long term goal (LTG);2 weeks  -       Row Name 07/13/25 1855          Transfer Goal 1 (PT)    Activity/Assistive Device (Transfer Goal 1, PT) transfers, all  -EL     [place] Level/Cues Needed (Transfer Goal 1, PT) modified independence  -EL     Time Frame (Transfer Goal 1, PT) long term goal (LTG);2 weeks  -       Row Name 07/13/25 1855          Gait Training Goal 1 (PT)    Activity/Assistive Device  (Gait Training Goal 1, PT) gait (walking locomotion);walker, rolling  -EL     [place] Level (Gait Training Goal 1, PT) modified independence  -EL     Distance (Gait Training Goal 1, PT) 115  -EL     Time Frame (Gait Training Goal 1, PT) long term goal (LTG);2 weeks  -EL       Row Name 07/13/25 1855          Therapy Assessment/Plan (PT)    Planned Therapy Interventions (PT) neuromuscular re-education;balance training;bed mobility training;transfer training;gait training;patient/family education;strengthening  -EL               User Key  (r) = Recorded By, (t) = Taken By, (c) = Cosigned By      Initials Name Provider Type    Chencho Polk, PT Physical Therapist                   Clinical Impression       Row Name 07/13/25 1851          Pain    Pain Side/Orientation generalized  -EL       Row Name 07/13/25 1851          Pain Scale: FACES Pre/Post-Treatment    Pain: FACES Scale, Pretreatment 2-->hurts little bit  -EL     Posttreatment Pain Rating 2-->hurts little bit  -EL       Row Name 07/13/25 1851          Plan of Care Review    Outcome Evaluation Pt is an 85 y.o. F adm to [facility] on 07/10/25 from [facility] with PNA. PMH: mixed anxiety and depressive disorder, hx breast cancer s/p R mastectomy, hx CLL, hx DVT/PE, DMII, CKD and hx falls. Pt reports living with spouse with advanced dementia, and typically she was independent and assisting him as needed. Pt states they have moved him in with her daughter to assist and she plans to stay there as well at [place]. Pt this date demonstrates significant weakness, and impaired mobitliy. Pt requiring MIN A for transfers and ambulation, and c/o fatigue following mobitliy. Pt does not appear safe to reutrn home at d/c and recommendation is reutrn to SNF. However, pt is adamantly refusing this at this time.  -EL       Row Name 07/13/25 1851          Therapy Assessment/Plan (PT)    Rehab Potential (PT) good  -EL     Criteria for Skilled Interventions Met (PT) yes;skilled  treatment is necessary  -EL     Therapy Frequency (PT) 5 times/wk  -EL       Row Name 07/13/25 1851          Vital Signs    Pre Patient Position Supine  -EL     Intra Patient Position Standing  -EL     Post Patient Position Sitting  -EL       Row Name 07/13/25 1851          Positioning and Restraints    Pre-Treatment Position in bed  -EL     Post Treatment Position chair  -EL               User Key  (r) = Recorded By, (t) = Taken By, (c) = Cosigned By      Initials Name Provider Type    Chencho Polk, PT Physical Therapist                   Outcome Measures       Row Name 07/13/25 1856 07/13/25 1641       How much help from another person do you currently need...    Turning from your back to your side while in flat bed without using bedrails? 3  -EL 3  -MA    Moving from lying on back to sitting on the side of a flat bed without bedrails? 3  -EL 3  -MA    Moving to and from a bed to a chair (including a wheelchair)? 3  -EL 3  -MA    Standing up from a chair using your arms (e.g., wheelchair, bedside chair)? 3  -EL 3  -MA    Climbing 3-5 steps with a railing? 2  -EL 3  -MA    To walk in hospital room? 3  -EL 3  -MA    AM-PAC 6 Clicks Score (PT) 17  -EL 18  -MA    Highest Level of Mobility Goal Stand (1 or More Minutes)-5  -EL Walk 10 Steps or More-6  -MA      Row Name 07/13/25 1251 07/13/25 0852       How much help from another person do you currently need...    Turning from your back to your side while in flat bed without using bedrails? 3  -MA 3  -MA    Moving from lying on back to sitting on the side of a flat bed without bedrails? 3  -MA 3  -MA    Moving to and from a bed to a chair (including a wheelchair)? 3  -MA 3  -MA    Standing up from a chair using your arms (e.g., wheelchair, bedside chair)? 3  -MA 3  -MA    Climbing 3-5 steps with a railing? 3  -MA 3  -MA    To walk in hospital room? 3  -MA 3  -MA    AM-PAC 6 Clicks Score (PT) 18  -MA 18  -MA    Highest Level of Mobility Goal Walk 10 Steps or More-6  -MA  Walk 10 Steps or More-6  -MA      Row Name 07/13/25 1856          Functional Assessment    Outcome Measure Options AM-PAC 6 Clicks Basic Mobility (PT)  -               User Key  (r) = Recorded By, (t) = Taken By, (c) = Cosigned By      Initials Name Provider Type    EL Chencho Rojas PT Physical Therapist    Leonor Buckner LPN Licensed Nurse                                 Physical Therapy Education       Title: PT OT SLP Therapies (In Progress)       Topic: Physical Therapy (Done)       Point: Mobility training (Done)       Learning Progress Summary            Patient Acceptance, E,TB, VU by EL at 7/13/2025 1856                      Point: Precautions (Done)       Learning Progress Summary            Patient Acceptance, E,TB, VU by EL at 7/13/2025 1856                                      User Key       Initials Effective Dates Name Provider Type Discipline     06/23/20 -  Chencho Rojas PT Physical Therapist PT                  PT Recommendation and Plan  Planned Therapy Interventions (PT): neuromuscular re-education, balance training, bed mobility training, transfer training, gait training, patient/family education, strengthening  Outcome Evaluation: Pt is an 85 y.o. F adm to [facility] on 07/10/25 from [facility] with PNA. PMH: mixed anxiety and depressive disorder, hx breast cancer s/p R mastectomy, hx CLL, hx DVT/PE, DMII, CKD and hx falls. Pt reports living with spouse with advanced dementia, and typically she was independent and assisting him as needed. Pt states they have moved him in with her daughter to assist and she plans to stay there as well at [place]. Pt this date demonstrates significant weakness, and impaired mobitliy. Pt requiring MIN A for transfers and ambulation, and c/o fatigue following mobitliy. Pt does not appear safe to reutrn home at d/c and recommendation is reutrn to SNF. However, pt is adamantly refusing this at this time.     Time Calculation:   PT Evaluation Complexity  History, PT  Evaluation Complexity: 1-2 personal factors and/or comorbidities  Examination of Body Systems (PT Eval Complexity): total of 3 or more elements  Clinical Presentation (PT Evaluation Complexity): evolving  Clinical Decision Making (PT Evaluation Complexity): moderate complexity  Overall Complexity (PT Evaluation Complexity): moderate complexity     PT Charges       Row Name 07/13/25 1856             Time Calculation    Start Time 0827  -EL      Stop Time 0847  -EL      Time Calculation (min) 20 min  -EL      PT Received On 07/13/25  -EL      PT - Next Appointment 07/14/25  -EL      PT Goal Re-Cert Due Date 07/27/25  -EL                User Key  (r) = Recorded By, (t) = Taken By, (c) = Cosigned By      Initials Name Provider Type    EL Chencho Rojas PT Physical Therapist                  Therapy Charges for Today       Code Description Service Date Service Provider Modifiers Qty    [id number] HC PT EVAL MOD COMPLEXITY 4 7/13/2025 Chencho Rojas PT GP 1            PT G-Codes  Outcome Measure Options: AM-PAC 6 Clicks Basic Mobility (PT)  AM-PAC 6 Clicks Score (PT): 17  PT Discharge Summary  Anticipated Discharge Disposition (PT): skilled nursing facility    Chencho Rojas PT  7/13/2025

## 2025-07-13 NOTE — PLAN OF CARE
Goal Outcome Evaluation:  Plan of Care Reviewed With: patient        Progress: improving  Outcome Evaluation: Patient is one assist to bathroom. Patient is on 4L of O2. Daughter at bedside. No c/o noted at this time.

## 2025-07-13 NOTE — PLAN OF CARE
Goal Outcome Evaluation:              Outcome Evaluation: Pt is an 85 y.o. F adm to [facility] on 07/10/25 from [facility] with PNA. PMH: mixed anxiety and depressive disorder, hx breast cancer s/p R mastectomy, hx CLL, hx DVT/PE, DMII, CKD and hx falls. Pt reports living with spouse with advanced dementia, and typically she was independent and assisting him as needed. Pt states they have moved him in with her daughter to assist and she plans to stay there as well at [place]. Pt this date demonstrates significant weakness, and impaired mobitliy. Pt requiring MIN A for transfers and ambulation, and c/o fatigue following mobitliy. Pt does not appear safe to reutrn home at d/c and recommendation is reutrn to SNF. However, pt is adamantly refusing this at this time.    Anticipated Discharge Disposition (PT): skilled nursing facility

## 2025-07-13 NOTE — PLAN OF CARE
Goal Outcome Evaluation:  Plan of Care Reviewed With: daughter, patient           Outcome Evaluation: Pt is an 85 y.o. F adm to [facility] on 07/10/25 from [facility] with PNA. PMH:  mixed anxiety and depressive disorder, hx breast cancer s/p R mastectomy, hx CLL, hx DVT/PE, DMII, CKD and hx falls. Pt and spouse (who has dementia) recently moved in with daughter for increased assist, SSH with no SAMY, 1 step down into bathroom. Pt typically (I) with ADLs, but dtr has been assisting recently with bathing at SNF, amb with RW/rollator. DME includes hospital bed, RW, shower seat, wc, and BSC & she uses 2L O2 only at noc. Pt has most recently been in a SNF & states she was just preparing for d/c home when she developed PNA & came to hospital for tx. Upon eval, pt is currently on 4L O2 & maintained 90% throughout eval. She presents with generalized weakness, dec activity tolerance/endurance, inc right knee pain & is requiring min A for ADL transfers (sup>stand) with RW & LB ADLs. She requires freq rest breaks & inc time to perform tasks due to SOA, inc coughing, dec endurance. She has inc productive coughing this date. Provided pt with a spirometer for use. OT gisella continue to follow for tx & recommends continued SNF upon discharge to regain ind, strength & endurance.    Anticipated Discharge Disposition (OT): skilled nursing facility

## 2025-07-13 NOTE — THERAPY EVALUATION
Patient Name: Diane Guan  : 1940    MRN: [id number]                              Today's Date: 2025       Admit Date: 7/10/2025    Visit Dx:     ICD-10-CM ICD-9-CM   1. Pneumonia due to infectious organism, unspecified laterality, unspecified part of lung  J18.9 486   2. 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    Acquired hypothyroidism    Type 2 diabetes mellitus with stage 3b chronic kidney disease, without long-term current use of insulin    Insomnia    Postmenopausal status    Shoulder pain    Overactive bladder    Vitamin B 12 deficiency    Seasonal allergies    Absolute anemia    Vitamin D deficiency    LIBBY (obstructive sleep apnea)    Nocturnal hypoxia    Acute respiratory failure with hypoxia and hypercapnia    Abdominal pain    COPD (chronic obstructive pulmonary disease)    CKD (chronic kidney disease) stage 3, GFR 30-59 ml/min    Sepsis due to pneumonia    Acute on chronic respiratory failure with hypoxemia    Class 1 obesity due to excess calories with serious comorbidity and body mass index (BMI) of 32.0 to 32.9 in adult    Acute hypoxemic respiratory failure    History of breast cancer    Personal history of CLL (chronic lymphocytic leukemia)    History of cigarette smoking    Fall at home    Rhinovirus infection    Acute respiratory failure with hypoxia    Hyperkalemia    Respiratory failure    Leukocytosis    Iron deficiency anemia    Malabsorption of iron    UTI (urinary tract infection)    Pneumonia     Past Medical History:   Diagnosis Date    Acute bronchitis due to human metapneumovirus 2020     Anxiety disorder     Arthritis     Arthritis of back     Breast cancer 02/2019    Invasive ductal carcinoma    Chronic lymphocytic leukemia (CLL), B-cell 01/2019    Depression     Diabetes mellitus     Disease of thyroid gland     Diverticulosis of colon 2018    Identified on colonoscopy    DVT (deep venous thrombosis)     Dysphagia 12/2020    Dyspnea on exertion     Fall at home 10/11/2023    Hemorrhoid     Hiatal hernia 12/2020    Hiatal hernia with GERD     large hiatal hernia with high-grade reflux on barium swallow; s/p laparoscopic fundoplication with gastropexy    History of breast cancer 10/11/2023    History of cigarette smoking 10/11/2023    History of diabetes mellitus     no meds now    HL (hearing loss)     Hyperlipidemia     Hypertension     Knee swelling     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    aspiration pneumonia; completed abx therapy    OAB (overactive bladder)     Personal history of CLL (chronic lymphocytic leukemia) 10/11/2023    Pulmonary emboli     Skin cancer     Sleep apnea     daughter states pt does not have and doesn't have machine     Past Surgical History:   Procedure Laterality Date    BLADDER SURGERY      BREAST BIOPSY      BRONCHOSCOPY N/A 09/13/2019    Procedure: BRONCHOSCOPY with bronchial washing;  Surgeon: Marnie Frankel MD;  Location: [facility] ENDOSCOPY;  Service: Pulmonary    BRONCHOSCOPY Bilateral 10/18/2023    Procedure: BRONCHOSCOPY AT BEDSIDE;  Surgeon: Vinicius Heredia DO;  Location: [facility] ENDOSCOPY;  Service: Pulmonary;  Laterality: Bilateral;    COLONOSCOPY  07/19/2018    severe diverticulosis    CYST REMOVAL      Removed a fatty cyst off of her back    ENDOSCOPY N/A 11/23/2020    Procedure: ESOPHAGOGASTRODUODENOSCOPY with dilatation (Bougie # 48, 50, 52, 54, 56, 58);  Surgeon: Den Plummer DO;  Location: [facility] ENDOSCOPY;  Service: General;  Laterality: N/A;  Post: large hiatal hernia, joshua's ulcers    EYE SURGERY      HERNIA REPAIR      HIATAL HERNIA REPAIR  N/A 12/30/2020    Procedure: Laparoscopic hiatal hernia repair with gastropexy;  Surgeon: Den Plummer DO;  Location: [facility] MAIN OR;  Service: General;  Laterality: N/A;    MASTECTOMY Right 02/04/2019    Invasive ductal carcinoma    TUBAL ABDOMINAL LIGATION        General Information       Row Name 07/13/25 1454          OT Time and Intention    Document Type evaluation  -DT     Mode of Treatment occupational therapy  -DT     Patient Effort good  -DT     Symptoms Noted During/After Treatment significant change in vital signs  -DT     Comment Dec BP from sit>stand  179/78 to 159/76  -DT       Row Name 07/13/25 1454          General Information    Patient Profile Reviewed yes  -DT     Prior Level of Function independent:;all household mobility;community mobility;ADL's  amb with Rollator/RW. Has BSC, hospital bed.  -DT     Existing Precautions/Restrictions fall;oxygen therapy device and L/min  -DT     Barriers to Rehab medically complex  -DT       Row Name 07/13/25 1454          Living Environment    Current Living Arrangements other (see comments)  Pt & spouse currently staying at daughter's home. spouse has dementia.  -DT       Row Name 07/13/25 1454          Home Main Entrance    Number of Stairs, Main Entrance none  -DT       Row Name 07/13/25 1454          Stairs Within Home, Primary    Number of Stairs, Within Home, Primary none  -DT       Row Name 07/13/25 1454          Cognition    Orientation Status (Cognition) oriented x 4  -DT       Row Name 07/13/25 1454          Safety Issues/Impairments Affecting Functional Mobility    Impairments Affecting Function (Mobility) balance;endurance/activity tolerance;shortness of breath;strength  -DT               User Key  (r) = Recorded By, (t) = Taken By, (c) = Cosigned By      Initials Name Provider Type    DT Anita Teresa, OT Occupational Therapist                     Mobility/ADL's       Row Name 07/13/25 0551          Bed Mobility    Bed Mobility bed  mobility (all) activities  -DT     All Activities, [place] (Bed Mobility) standby assist  -DT       Row Name 07/13/25 1503          Transfers    Transfers sit-stand transfer;bed-chair transfer  -DT       Row Name 07/13/25 1503          Bed-Chair Transfer    Bed-Chair [place] (Transfers) contact guard  -DT     Assistive Device (Bed-Chair Transfers) walker, front-wheeled  -DT       Row Name 07/13/25 1503          Sit-Stand Transfer    Sit-Stand [place] (Transfers) minimum assist (75% patient effort)  -DT     Assistive Device (Sit-Stand Transfers) walker, front-wheeled  -DT       Row Name 07/13/25 1503          Activities of Daily Living    BADL Assessment/Intervention lower body dressing;feeding  -DT       Row Name 07/13/25 1503          Lower Body Dressing Assessment/Training    [place] Level (Lower Body Dressing) lower body dressing skills;minimum assist (75% patient effort)  -DT       Row Name 07/13/25 1503          Self-Feeding Assessment/Training    [place] Level (Feeding) feeding skills;independent  -DT               User Key  (r) = Recorded By, (t) = Taken By, (c) = Cosigned By      Initials Name Provider Type    DT Anita Teresa, OT Occupational Therapist                   Obj/Interventions       Row Name 07/13/25 1504          Range of Motion Comprehensive    General Range of Motion bilateral upper extremity ROM WFL  -DT       [facility] Name 07/13/25 1504          Strength Comprehensive (MMT)    Comment, General Manual Muscle Testing (MMT) Assessment BUE= 4-/5  -DT       Row Name 07/13/25 1504          Motor Skills    Motor Skills functional endurance  -DT     Functional Endurance fair-  -DT       Row Name 07/13/25 1504          Balance    Balance Assessment sitting static balance;sitting dynamic balance;standing dynamic balance;standing static balance  -DT     Static Sitting Balance supervision  -DT     Dynamic Sitting Balance contact guard  -DT     Position, Sitting Balance  sitting edge of bed;unsupported  -DT     Static Standing Balance contact guard  -DT     Dynamic Standing Balance minimal assist  -DT     Position/Device Used, Standing Balance walker, front-wheeled  -DT               User Key  (r) = Recorded By, (t) = Taken By, (c) = Cosigned By      Initials Name Provider Type    DT Anita Teresa, OT Occupational Therapist                   Goals/Plan       Row Name 07/13/25 1509          Transfer Goal 1 (OT)    Activity/Assistive Device (Transfer Goal 1, OT) transfers, all  -DT     [place] Level/Cues Needed (Transfer Goal 1, OT) modified independence  -DT     Time Frame (Transfer Goal 1, OT) 2 weeks  -DT       Row Name 07/13/25 1500          Dressing Goal 1 (OT)    Activity/Device (Dressing Goal 1, OT) dressing skills, all;lower body dressing  -DT     [place]/Cues Needed (Dressing Goal 1, OT) modified independence  -DT     Time Frame (Dressing Goal 1, OT) 2 weeks  -DT       Row Name 07/13/25 1502          Toileting Goal 1 (OT)    Activity/Device (Toileting Goal 1, OT) toileting skills, all  -DT     [place] Level/Cues Needed (Toileting Goal 1, OT) modified independence  -DT     Time Frame (Toileting Goal 1, OT) 2 weeks  -DT       Row Name 07/13/25 1501          Grooming Goal 1 (OT)    Activity/Device (Grooming Goal 1, OT) grooming skills, all  -DT     [place] (Grooming Goal 1, OT) modified independence  -DT     Time Frame (Grooming Goal 1, OT) 2 weeks  -DT     Strategies/Barriers (Grooming Goal 1, OT) at sink side  -DT       Row Name 07/13/25 1508          Therapy Assessment/Plan (OT)    Planned Therapy Interventions (OT) activity tolerance training;BADL retraining;functional balance retraining;neuromuscular control/coordination retraining;occupation/activity based interventions;passive ROM/stretching;patient/caregiver education/training;ROM/therapeutic exercise;strengthening exercise  -DT               User Key  (r) = Recorded By, (t) = Taken By, (c)  = Cosigned By      Initials Name Provider Type    DT Anita Teresa, OT Occupational Therapist                   Clinical Impression       Row Name 07/13/25 1503          Pain Assessment    Pain Location knee  -DT     Pain Side/Orientation right  -DT     Pain Management Interventions exercise or physical activity utilized  -DT     Response to Pain Interventions activity participation with increased pain  -DT     Additional Documentation Pain Scale: FACES Pre/Post-Treatment (Group)  -DT       Row Name 07/13/25 1505          Pain Scale: FACES Pre/Post-Treatment    Pain: FACES Scale, Pretreatment 0-->no hurt  -DT     Posttreatment Pain Rating 4-->hurts little more  -DT       Row Name 07/13/25 1506          Plan of Care Review    Plan of Care Reviewed With daughter;patient  -DT     Outcome Evaluation Pt is an 85 y.o. F adm to [facility] on 07/10/25 from [facility] with PNA. PMH:  mixed anxiety and depressive disorder, hx breast cancer s/p R mastectomy, hx CLL, hx DVT/PE, DMII, CKD and hx falls. Pt and spouse (who has dementia) recently moved in with daughter for increased assist, SSH with no SAMY, 1 step down into bathroom. Pt typically (I) with ADLs, but dtr has been assisting recently with bathing at [facility], amb with RW/rollator. DME includes hospital bed, RW, shower seat, wc, and BSC & she uses 2L O2 only at noc. Pt has most recently been in a SNF & states she was just preparing for d/c home when she developed PNA & came to hospital for tx. Upon eval, pt is currently on 4L O2 & maintained 90% throughout eval. She presents with generalized weakness, dec activity tolerance/endurance, inc right knee pain & is requiring min A for ADL transfers (sup>stand) with RW & LB ADLs. She requires freq rest breaks & inc time to perform tasks due to SOA, inc coughing, dec endurance. She has inc productive coughing this date. Provided pt with a spirometer for use. OT gisella continue to follow for tx & recommends continued SNF upon  discharge to regain ind, strength & endurance.  -DT       Row Name 07/13/25 1505          Therapy Assessment/Plan (OT)    Rehab Potential (OT) good  -DT     Criteria for Skilled Therapeutic Interventions Met (OT) yes;meets criteria;skilled treatment is necessary  -DT     Therapy Frequency (OT) 3 times/wk  -DT     Predicted Duration of Therapy Intervention (OT) until d/c  -DT       Row Name 07/13/25 1505          Therapy Plan Review/Discharge Plan (OT)    Anticipated Discharge Disposition (OT) skilled nursing facility  -DT       Row Name 07/13/25 1505          Vital Signs    Pre Systolic BP Rehab 179  supine  -DT     Pre Treatment Diastolic BP 78  -DT     Intra Systolic BP Rehab 159  sit  -DT     Intra Treatment Diastolic BP 76  -DT     Pretreatment Heart Rate (beats/min) 79  -DT     Intratreatment Heart Rate (beats/min) 81  -DT     Pretreatment Resp Rate (breaths/min) 20  -DT     Intratreatment Resp Rate (breaths/min) 25  -DT     Intra SpO2 (%) 95  -DT     O2 Delivery Intra Treatment supplemental O2  4L  -DT     Post SpO2 (%) 94  -DT     O2 Delivery Post Treatment supplemental O2  4L  -DT       Row Name 07/13/25 1505          Positioning and Restraints    Pre-Treatment Position in bed  -DT     Post Treatment Position chair  -DT     In Chair notified nsg;sitting;call light within reach;encouraged to call for assist;exit alarm on;with family/caregiver  -DT               User Key  (r) = Recorded By, (t) = Taken By, (c) = Cosigned By      Initials Name Provider Type    DT Anita Teresa, OT Occupational Therapist                   Outcome Measures    No documentation.                   Occupational Therapy Education       Title: PT OT SLP Therapies (In Progress)       Topic: Occupational Therapy (In Progress)       Point: ADL training (Done)       Learning Progress Summary            Patient Acceptance, E,TB, VU by DT at 7/13/2025 1510    Comment: Role of OT, goals & POC, safety prec   Family Acceptance, E,TB,  VU by DT at 7/13/2025 1510    Comment: Role of OT, goals & POC, safety prec                      Point: Precautions (Done)       Learning Progress Summary            Patient Acceptance, E,TB, VU by DT at 7/13/2025 1510    Comment: Role of OT, goals & POC, safety prec   Family Acceptance, E,TB, VU by DT at 7/13/2025 1510    Comment: Role of OT, goals & POC, safety prec                                      User Key       Initials Effective Dates Name Provider Type Discipline    DT 07/11/23 -  Anita Teresa, OT Occupational Therapist OT                  OT Recommendation and Plan  Planned Therapy Interventions (OT): activity tolerance training, BADL retraining, functional balance retraining, neuromuscular control/coordination retraining, occupation/activity based interventions, passive ROM/stretching, patient/caregiver education/training, ROM/therapeutic exercise, strengthening exercise  Therapy Frequency (OT): 3 times/wk  Plan of Care Review  Plan of Care Reviewed With: daughter, patient  Outcome Evaluation: Pt is an 85 y.o. F adm to [facility] on 07/10/25 from [facility] with PNA. PMH:  mixed anxiety and depressive disorder, hx breast cancer s/p R mastectomy, hx CLL, hx DVT/PE, DMII, CKD and hx falls. Pt and spouse (who has dementia) recently moved in with daughter for increased assist, SSH with no SAMY, 1 step down into bathroom. Pt typically (I) with ADLs, but dtr has been assisting recently with bathing at SNF, amb with RW/rollator. DME includes hospital bed, RW, shower seat, wc, and BSC & she uses 2L O2 only at noc. Pt has most recently been in a SNF & states she was just preparing for d/c home when she developed PNA & came to hospital for tx. Upon eval, pt is currently on 4L O2 & maintained 90% throughout eval. She presents with generalized weakness, dec activity tolerance/endurance, inc right knee pain & is requiring min A for ADL transfers (sup>stand) with RW & LB ADLs. She requires freq rest breaks &  inc time to perform tasks due to SOA, inc coughing, dec endurance. She has inc productive coughing this date. Provided pt with a spirometer for use. OT gisella continue to follow for tx & recommends continued SNF upon discharge to regain ind, strength & endurance.     Time Calculation:         Time Calculation- OT       Row Name 07/13/25 1510             Time Calculation- OT    OT Start Time 1422  -DT      OT Stop Time 1450  -DT      OT Time Calculation (min) 28 min  -DT      OT Received On 07/13/25  -DT      OT - Next Appointment 07/15/25  -DT      OT Goal Re-Cert Due Date 07/27/25  -DT                User Key  (r) = Recorded By, (t) = Taken By, (c) = Cosigned By      Initials Name Provider Type    Anita Stanford, OT Occupational Therapist                           Anita Teresa, OT  7/13/2025

## 2025-07-13 NOTE — PLAN OF CARE
Goal Outcome Evaluation:                                            Problem: Adult Inpatient Plan of Care  Goal: Plan of Care Review  Outcome: Progressing  Flowsheets (Taken 7/13/2025 1505 by Anita Teresa, OT)  Outcome Evaluation: Pt is an 85 y.o. F adm to [facility] on 07/10/25 from [facility] with PNA. PMH:  mixed anxiety and depressive disorder, hx breast cancer s/p R mastectomy, hx CLL, hx DVT/PE, DMII, CKD and hx falls. Pt and spouse (who has dementia) recently moved in with daughter for increased assist, SSH with no SAMY, 1 step down into bathroom. Pt typically (I) with ADLs, but dtr has been assisting recently with bathing at SNF, amb with RW/rollator. DME includes hospital bed, RW, shower seat, wc, and BSC & she uses 2L O2 only at noc. Pt has most recently been in a SNF & states she was just preparing for d/c home when she developed PNA & came to hospital for tx. Upon eval, pt is currently on 4L O2 & maintained 90% throughout eval. She presents with generalized weakness, dec activity tolerance/endurance, inc right knee pain & is requiring min A for ADL transfers (sup>stand) with RW & LB ADLs. She requires freq rest breaks & inc time to perform tasks due to SOA, inc coughing, dec endurance. She has inc productive coughing this date. Provided pt with a spirometer for use. OT gisella continue to follow for tx & recommends continued SNF upon discharge to regain ind, strength & endurance.  Goal: Patient-Specific Goal (Individualized)  Outcome: Progressing  Goal: Absence of Hospital-Acquired Illness or Injury  Outcome: Progressing  Intervention: Identify and Manage Fall Risk  Recent Flowsheet Documentation  Taken 7/13/2025 1410 by Leonor See LPN  Safety Promotion/Fall Prevention:   safety round/check completed   room organization consistent   nonskid shoes/slippers when out of bed   mobility aid in reach   gait belt   lighting adjusted   fall prevention program maintained   clutter free environment  maintained   assistive device/personal items within reach   activity supervised  Taken 7/13/2025 1251 by Leonor See LPN  Safety Promotion/Fall Prevention:   safety round/check completed   room organization consistent   nonskid shoes/slippers when out of bed   mobility aid in reach   lighting adjusted   gait belt   fall prevention program maintained   clutter free environment maintained   assistive device/personal items within reach   activity supervised  Taken 7/13/2025 1045 by Leonor See LPN  Safety Promotion/Fall Prevention:   safety round/check completed   room organization consistent   nonskid shoes/slippers when out of bed   mobility aid in reach   lighting adjusted   gait belt   fall prevention program maintained   clutter free environment maintained   assistive device/personal items within reach   activity supervised  Taken 7/13/2025 0852 by Leonor See LPN  Safety Promotion/Fall Prevention:   safety round/check completed   room organization consistent   nonskid shoes/slippers when out of bed   lighting adjusted   mobility aid in reach   fall prevention program maintained   gait belt   clutter free environment maintained   assistive device/personal items within reach   activity supervised  Intervention: Prevent Skin Injury  Recent Flowsheet Documentation  Taken 7/13/2025 1251 by Leonor See LPN  Body Position: position changed independently  Skin Protection: incontinence pads utilized  Taken 7/13/2025 0852 by Leonor See LPN  Body Position: position changed independently  Skin Protection: incontinence pads utilized  Intervention: Prevent and Manage VTE (Venous Thromboembolism) Risk  Recent Flowsheet Documentation  Taken 7/13/2025 0852 by Leonor See LPN  VTE Prevention/Management:   SCDs (sequential compression devices) off   patient refused intervention  Intervention: Prevent Infection  Recent Flowsheet Documentation  Taken 7/13/2025 1410 by Leonor See LPN  Infection Prevention:   single patient  room provided   rest/sleep promoted   personal protective equipment utilized   equipment surfaces disinfected   hand hygiene promoted   cohorting utilized   environmental surveillance performed  Taken 7/13/2025 1251 by Leonor See LPN  Infection Prevention:   single patient room provided   equipment surfaces disinfected   hand hygiene promoted   rest/sleep promoted   personal protective equipment utilized   cohorting utilized   environmental surveillance performed  Taken 7/13/2025 1045 by Leonor See LPN  Infection Prevention:   single patient room provided   rest/sleep promoted   personal protective equipment utilized   equipment surfaces disinfected   environmental surveillance performed   cohorting utilized   hand hygiene promoted  Taken 7/13/2025 0852 by Leonor See LPN  Infection Prevention:   single patient room provided   rest/sleep promoted   personal protective equipment utilized   equipment surfaces disinfected   environmental surveillance performed   cohorting utilized   hand hygiene promoted  Goal: Optimal Comfort and Wellbeing  Outcome: Progressing  Intervention: Provide Person-Centered Care  Recent Flowsheet Documentation  Taken 7/13/2025 1251 by Leonor See LPN  Trust Relationship/Rapport:   care explained   emotional support provided   questions answered   questions encouraged   reassurance provided   thoughts/feelings acknowledged   empathic listening provided   choices provided  Taken 7/13/2025 0852 by Leonor See LPN  Trust Relationship/Rapport:   care explained   empathic listening provided   questions encouraged   reassurance provided   thoughts/feelings acknowledged   questions answered   choices provided   emotional support provided  Goal: Readiness for Transition of Care  Outcome: Progressing     Problem: Pneumonia  Goal: Fluid Balance  Outcome: Progressing  Goal: Absence of Infection Signs and Symptoms  Outcome: Progressing  Goal: Effective Oxygenation and Ventilation  Outcome:  Progressing  Intervention: Promote Airway Secretion Clearance  Recent Flowsheet Documentation  Taken 7/13/2025 1251 by Leonor See LPN  Activity Management: activity encouraged  Taken 7/13/2025 0852 by Leonor See LPN  Activity Management: activity encouraged  Cough And Deep Breathing: done independently per patient  Intervention: Optimize Oxygenation and Ventilation  Recent Flowsheet Documentation  Taken 7/13/2025 1251 by Leonor See LPN  Head of Bed (HOB) Positioning: HOB elevated  Taken 7/13/2025 0852 by Loenor See LPN  Head of Bed ([facility]) Positioning: HOB elevated     Problem: Pneumonia  Goal: Fluid Balance  Outcome: Progressing  Goal: Absence of Infection Signs and Symptoms  Outcome: Progressing  Goal: Effective Oxygenation and Ventilation  Outcome: Progressing  Intervention: Promote Airway Secretion Clearance  Recent Flowsheet Documentation  Taken 7/13/2025 1251 by Leonor See LPN  Activity Management: activity encouraged  Taken 7/13/2025 0852 by Leonor See LPN  Activity Management: activity encouraged  Cough And Deep Breathing: done independently per patient  Intervention: Optimize Oxygenation and Ventilation  Recent Flowsheet Documentation  Taken 7/13/2025 1251 by Leonor See LPN  Head of Bed (HOB) Positioning: HOB elevated  Taken 7/13/2025 0852 by Leonor See LPN  Head of Bed ([facility]) Positioning: HOB elevated     Problem: Fall Injury Risk  Goal: Absence of Fall and Fall-Related Injury  Outcome: Progressing  Intervention: Identify and Manage Contributors  Recent Flowsheet Documentation  Taken 7/13/2025 1410 by Leonor See LPN  Medication Review/Management: medications reviewed  Taken 7/13/2025 1251 by Leonor See LPN  Medication Review/Management: medications reviewed  Taken 7/13/2025 1045 by Leonor See LPN  Medication Review/Management: medications reviewed  Taken 7/13/2025 0852 by Leonor See LPN  Medication Review/Management: medications reviewed  Intervention: Promote Injury-Free  Environment  Recent Flowsheet Documentation  Taken 7/13/2025 1410 by Leonor See LPN  Safety Promotion/Fall Prevention:   safety round/check completed   room organization consistent   nonskid shoes/slippers when out of bed   mobility aid in reach   gait belt   lighting adjusted   fall prevention program maintained   clutter free environment maintained   assistive device/personal items within reach   activity supervised  Taken 7/13/2025 1251 by Leonor See LPN  Safety Promotion/Fall Prevention:   safety round/check completed   room organization consistent   nonskid shoes/slippers when out of bed   mobility aid in reach   lighting adjusted   gait belt   fall prevention program maintained   clutter free environment maintained   assistive device/personal items within reach   activity supervised  Taken 7/13/2025 1045 by Leonor See LPN  Safety Promotion/Fall Prevention:   safety round/check completed   room organization consistent   nonskid shoes/slippers when out of bed   mobility aid in reach   lighting adjusted   gait belt   fall prevention program maintained   clutter free environment maintained   assistive device/personal items within reach   activity supervised  Taken 7/13/2025 0852 by Leonor See LPN  Safety Promotion/Fall Prevention:   safety round/check completed   room organization consistent   nonskid shoes/slippers when out of bed   lighting adjusted   mobility aid in reach   fall prevention program maintained   gait belt   clutter free environment maintained   assistive device/personal items within reach   activity supervised     Problem: Skin Injury Risk Increased  Goal: Skin Health and Integrity  Outcome: Progressing  Intervention: Optimize Skin Protection  Recent Flowsheet Documentation  Taken 7/13/2025 1251 by Leonor See LPN  Activity Management: activity encouraged  Pressure Reduction Techniques: frequent weight shift encouraged  Head of Bed (HOB) Positioning: HOB elevated  Pressure Reduction  Devices: pressure-redistributing mattress utilized  Skin Protection: incontinence pads utilized  Taken 7/13/2025 0852 by Leonor See LPN  Activity Management: activity encouraged  Pressure Reduction Techniques: frequent weight shift encouraged  Head of Bed (HOB) Positioning: HOB elevated  Pressure Reduction Devices: pressure-redistributing mattress utilized  Skin Protection: incontinence pads utilized

## 2025-07-13 NOTE — PROGRESS NOTES
[facility] MEDICINE SERVICE  DAILY PROGRESS NOTE    NAME: Diane Guan  : 1940  MRN: [id number]      LOS: 3 days     PROVIDER OF SERVICE: Yves Salas MD    Chief Complaint: <principal problem not specified>    Subjective:     Interval History:  History taken from: patient    Seen and examined at bedside, resting comfortably.        Review of Systems:   Review of Systems negative except as mentioned above    Objective:     Vital Signs  Temp:  [97.5 °F (36.4 °C)-99 °F (37.2 °C)] 97.7 °F (36.5 °C)  Heart Rate:  [71-84] 76  Resp:  [13-31] 21  BP: (144-176)/(60-75) 173/75  Flow (L/min) (Oxygen Therapy):  [4] 4   Body mass index is 31.11 kg/m².    Physical Exam  Physical Exam  General: Awake, alert, conversational  HEENT, atraumatic normocephalic  Cardio, heart rate normal, rhythm regular  Respiratory: Clear to auscultation on my exam, on 2 L nasal cannula  Abdomen, soft, nontender, no rebound or guarding  Extremities: No remarkable edema in the bilateral extremities  Neuro: Awake alert  conversational, normal speech, moves all extremities     Diagnostic Data    Results from last 7 days   Lab Units 25  0315   WBC 10*3/mm3 18.96*   HEMOGLOBIN g/dL 9.3*   HEMATOCRIT % 32.4*   PLATELETS 10*3/mm3 177   GLUCOSE mg/dL 111*   CREATININE mg/dL 1.01*   BUN mg/dL 24.6*   SODIUM mmol/L 141   POTASSIUM mmol/L 4.8   AST (SGOT) U/L 15   ALT (SGPT) U/L 12   ALK PHOS U/L 115   BILIRUBIN mg/dL <0.2   ANION GAP mmol/L 9.6       CT Chest Without Contrast Diagnostic  Result Date: 2025  Impression: CT scan of the chest without IV contrast demonstrating subsegmental atelectasis and/or linear fibrosis at the lung bases, stable. Coronary artery calcifications are seen. Electronically Signed: Rene Ramirez MD  2025 3:38 PM EDT  Workstation ID: [id number]        I reviewed the patient's new clinical results.    Assessment/Plan:     Active and Resolved Problems  Active Hospital Problems    Diagnosis  POA     Pneumonia [J18.9]  Yes      Resolved Hospital Problems   No resolved problems to display.       Atlectasis  Leukocytosis (WBC 20.7) in patient with known CLL  Chronic lymphocytic leukemia (CLL), stable  CKD stage 3B, baseline Cr ~1.8, improved to 1.18  Type 2 diabetes  Hypertension  Hyperlipidemia  Hypothyroidism    Plan:    -Continue empiric antibiotics with ceftriaxone, DC vancomycin given MRSA screen negative.  Start DuoNebs, Pulmicort, supplemental oxygen as needed.   -Ordered CT chest and discussed results with patient and daughter.    -Leukocytosis: Likely reactive vs. CLL-related.  Her last WBC count during last admission 2 weeks ago was in 24s, likely this is chronic.      -CLL: No acute issues identified. Hematology to follow outpatient     -CKD stage 3B: Cr improved from recent baseline. Monitor renal function daily; adjust antibiotics dosing accordingly.    -DM: SSI for diabetes, monitor glucose, adjust insulin regimen as needed.    -HTN: Continue home antihypertensives as tolerated. Monitor BP daily.    -HLD: chronic    -Hypothyroidism: Continue home levothyroxine.     -Resume remaining home medications     VTE Prophylaxis:  Pharmacologic & mechanical VTE prophylaxis orders are present.             Disposition Planning:     Barriers to Discharge: Pending blood culture  Anticipated Date of Discharge: 7/15  Place of Discharge: Home versus SNF      Time: 30 minutes     Code Status and Medical Interventions: CPR (Attempt to Resuscitate); Full Support   Ordered at: 07/11/25 0126     Code Status (Patient has no pulse and is not breathing):    CPR (Attempt to Resuscitate)     Medical Interventions (Patient has pulse or is breathing):    Full Support       Signature: Electronically signed by Yves Salas MD, 07/13/25, 12:34 EDT.  [facility] Hospitalist Team

## 2025-07-14 LAB
ANION GAP SERPL CALCULATED.3IONS-SCNC: 14.5 MMOL/L (ref 5–15)
BUN SERPL-MCNC: 19.2 MG/DL (ref 8–23)
BUN/CREAT SERPL: 18.8 (ref 7–25)
CALCIUM SPEC-SCNC: 9.2 MG/DL (ref 8.6–10.5)
CHLORIDE SERPL-SCNC: 105 MMOL/L (ref 98–107)
CO2 SERPL-SCNC: 22.5 MMOL/L (ref 22–29)
CREAT SERPL-MCNC: 1.02 MG/DL (ref 0.57–1)
DEPRECATED RDW RBC AUTO: 54.1 FL (ref 37–54)
EGFRCR SERPLBLD CKD-EPI 2021: 54.4 ML/MIN/1.73
EOSINOPHIL # BLD MANUAL: 0.15 10*3/MM3 (ref 0–0.4)
EOSINOPHIL NFR BLD MANUAL: 1 % (ref 0.3–6.2)
ERYTHROCYTE [DISTWIDTH] IN BLOOD BY AUTOMATED COUNT: 15.7 % (ref 12.3–15.4)
GLUCOSE BLDC GLUCOMTR-MCNC: 111 MG/DL (ref 70–105)
GLUCOSE BLDC GLUCOMTR-MCNC: 114 MG/DL (ref 70–105)
GLUCOSE BLDC GLUCOMTR-MCNC: 125 MG/DL (ref 70–105)
GLUCOSE BLDC GLUCOMTR-MCNC: 144 MG/DL (ref 70–105)
GLUCOSE SERPL-MCNC: 118 MG/DL (ref 65–99)
HCT VFR BLD AUTO: 35.5 % (ref 34–46.6)
HGB BLD-MCNC: 10.2 G/DL (ref 12–15.9)
HYPOCHROMIA BLD QL: ABNORMAL
LYMPHOCYTES # BLD MANUAL: 10.63 10*3/MM3 (ref 0.7–3.1)
LYMPHOCYTES NFR BLD MANUAL: 3 % (ref 5–12)
MCH RBC QN AUTO: 27 PG (ref 26.6–33)
MCHC RBC AUTO-ENTMCNC: 28.7 G/DL (ref 31.5–35.7)
MCV RBC AUTO: 93.9 FL (ref 79–97)
MONOCYTES # BLD: 0.46 10*3/MM3 (ref 0.1–0.9)
NEUTROPHILS # BLD AUTO: 3.85 10*3/MM3 (ref 1.7–7)
NEUTROPHILS NFR BLD MANUAL: 24 % (ref 42.7–76)
NEUTS BAND NFR BLD MANUAL: 1 % (ref 0–5)
OVALOCYTES BLD QL SMEAR: ABNORMAL
PLASMA CELL PREC NFR BLD MANUAL: 2 % (ref 0–0)
PLATELET # BLD AUTO: 174 10*3/MM3 (ref 140–450)
PMV BLD AUTO: 11 FL (ref 6–12)
POIKILOCYTOSIS BLD QL SMEAR: ABNORMAL
POTASSIUM SERPL-SCNC: 4 MMOL/L (ref 3.5–5.2)
RBC # BLD AUTO: 3.78 10*6/MM3 (ref 3.77–5.28)
SCAN SLIDE: NORMAL
SMALL PLATELETS BLD QL SMEAR: ADEQUATE
SMUDGE CELLS BLD QL SMEAR: ABNORMAL
SODIUM SERPL-SCNC: 142 MMOL/L (ref 136–145)
VARIANT LYMPHS NFR BLD MANUAL: 69 % (ref 19.6–45.3)
WBC NRBC COR # BLD AUTO: 15.41 10*3/MM3 (ref 3.4–10.8)

## 2025-07-14 PROCEDURE — 85007 BL SMEAR W/DIFF WBC COUNT: CPT | Performed by: PEDIATRICS

## 2025-07-14 PROCEDURE — 94664 DEMO&/EVAL PT USE INHALER: CPT

## 2025-07-14 PROCEDURE — 97116 GAIT TRAINING THERAPY: CPT

## 2025-07-14 PROCEDURE — 97530 THERAPEUTIC ACTIVITIES: CPT

## 2025-07-14 PROCEDURE — 80048 BASIC METABOLIC PNL TOTAL CA: CPT | Performed by: PEDIATRICS

## 2025-07-14 PROCEDURE — 82948 REAGENT STRIP/BLOOD GLUCOSE: CPT

## 2025-07-14 PROCEDURE — 94799 UNLISTED PULMONARY SVC/PX: CPT

## 2025-07-14 PROCEDURE — 25010000002 CEFTRIAXONE PER 250 MG

## 2025-07-14 PROCEDURE — 97110 THERAPEUTIC EXERCISES: CPT

## 2025-07-14 PROCEDURE — 85025 COMPLETE CBC W/AUTO DIFF WBC: CPT | Performed by: PEDIATRICS

## 2025-07-14 PROCEDURE — 94761 N-INVAS EAR/PLS OXIMETRY MLT: CPT

## 2025-07-14 RX ORDER — HYDROXYZINE HYDROCHLORIDE 25 MG/1
50 TABLET, FILM COATED ORAL 3 TIMES DAILY PRN
Status: DISCONTINUED | OUTPATIENT
Start: 2025-07-14 | End: 2025-07-15 | Stop reason: HOSPADM

## 2025-07-14 RX ORDER — GUAIFENESIN/DEXTROMETHORPHAN 100-10MG/5
5 SYRUP ORAL EVERY 4 HOURS PRN
Status: DISCONTINUED | OUTPATIENT
Start: 2025-07-14 | End: 2025-07-15 | Stop reason: HOSPADM

## 2025-07-14 RX ADMIN — IPRATROPIUM BROMIDE AND ALBUTEROL SULFATE 3 ML: .5; 3 SOLUTION RESPIRATORY (INHALATION) at 11:45

## 2025-07-14 RX ADMIN — GUAIFENESIN SYRUP AND DEXTROMETHORPHAN 5 ML: 100; 10 SYRUP ORAL at 21:23

## 2025-07-14 RX ADMIN — AMLODIPINE BESYLATE 10 MG: 5 TABLET ORAL at 08:05

## 2025-07-14 RX ADMIN — APIXABAN 2.5 MG: 2.5 TABLET, FILM COATED ORAL at 20:16

## 2025-07-14 RX ADMIN — Medication 10 ML: at 20:17

## 2025-07-14 RX ADMIN — APIXABAN 2.5 MG: 2.5 TABLET, FILM COATED ORAL at 08:04

## 2025-07-14 RX ADMIN — HYDROXYZINE HYDROCHLORIDE 50 MG: 25 TABLET, FILM COATED ORAL at 15:17

## 2025-07-14 RX ADMIN — ATORVASTATIN CALCIUM 40 MG: 40 TABLET, FILM COATED ORAL at 20:16

## 2025-07-14 RX ADMIN — IPRATROPIUM BROMIDE AND ALBUTEROL SULFATE 3 ML: .5; 3 SOLUTION RESPIRATORY (INHALATION) at 07:40

## 2025-07-14 RX ADMIN — Medication 10 ML: at 08:05

## 2025-07-14 RX ADMIN — LEVOTHYROXINE SODIUM 100 MCG: 100 TABLET ORAL at 05:20

## 2025-07-14 RX ADMIN — ROPINIROLE HYDROCHLORIDE 0.25 MG: 0.25 TABLET, FILM COATED ORAL at 20:16

## 2025-07-14 RX ADMIN — IPRATROPIUM BROMIDE AND ALBUTEROL SULFATE 3 ML: .5; 3 SOLUTION RESPIRATORY (INHALATION) at 19:35

## 2025-07-14 RX ADMIN — BUDESONIDE 0.5 MG: 0.5 SUSPENSION RESPIRATORY (INHALATION) at 19:29

## 2025-07-14 RX ADMIN — DOCUSATE SODIUM 100 MG: 100 CAPSULE, LIQUID FILLED ORAL at 08:04

## 2025-07-14 RX ADMIN — CHLORTHALIDONE 25 MG: 25 TABLET ORAL at 08:05

## 2025-07-14 RX ADMIN — MIRTAZAPINE 15 MG: 15 TABLET, FILM COATED ORAL at 20:16

## 2025-07-14 RX ADMIN — SENNOSIDES AND DOCUSATE SODIUM 2 TABLET: 50; 8.6 TABLET ORAL at 20:16

## 2025-07-14 RX ADMIN — SERTRALINE HYDROCHLORIDE 25 MG: 25 TABLET, FILM COATED ORAL at 08:04

## 2025-07-14 RX ADMIN — PANTOPRAZOLE SODIUM 40 MG: 40 TABLET, DELAYED RELEASE ORAL at 08:04

## 2025-07-14 RX ADMIN — IPRATROPIUM BROMIDE AND ALBUTEROL SULFATE 3 ML: .5; 3 SOLUTION RESPIRATORY (INHALATION) at 15:40

## 2025-07-14 RX ADMIN — BUDESONIDE 0.5 MG: 0.5 SUSPENSION RESPIRATORY (INHALATION) at 07:44

## 2025-07-14 RX ADMIN — CEFTRIAXONE 2000 MG: 2 INJECTION, POWDER, FOR SOLUTION INTRAMUSCULAR; INTRAVENOUS at 14:27

## 2025-07-14 NOTE — PLAN OF CARE
Goal Outcome Evaluation:      Assessment: Diane Guan presents with functional mobility impairments which indicate the need for skilled intervention. Patient demos low activity tolerance but requires no more than CGA for mobility.  Anticipate she will be safe for home discharge with HHPT, use of RW, and support from family.  Daughter voicing thy plan to provide 24/7 upon discharge; reviewed home safety and mobility strategies within the home.Tolerating session today without incident. Will continue to follow and progress as tolerated.            Anticipated Discharge Disposition (PT): home, home with 24/7 care, home with home health

## 2025-07-14 NOTE — PROGRESS NOTES
[facility] MEDICINE SERVICE  DAILY PROGRESS NOTE    NAME: Diane Guan  : 1940  MRN: [id number]      LOS: 4 days     PROVIDER OF SERVICE: Tamera Toscano MD    Chief Complaint: Pneumonia    Subjective:     Interval History:  History taken from: The patient    The patient was seen at bedside this morning.  There were no acute events reported overnight. She reports improvement in her shortness of breath.  States that she uses O2 supplementation at home at 2 L/min.  The patient is still complaining of coughing which is productive of clear sputum and requested cough medicine.  Discussed discharge plan with the patient she is refusing to go back to SNF.  States that she believes she will do better being cared for at home with her daughter.        Review of Systems:   As above otherwise negative    Objective:     Vital Signs  Temp:  [97.4 °F (36.3 °C)-98.2 °F (36.8 °C)] 97.6 °F (36.4 °C)  Heart Rate:  [73-88] 79  Resp:  [17-27] 24  BP: (141-161)/(62-74) 154/64  Flow (L/min) (Oxygen Therapy):  [2-4] 2   Body mass index is 31.11 kg/m².    Physical Exam  General: Awake, alert, conversational  HEENT, atraumatic normocephalic  Cardio, heart rate normal, rhythm regular  Respiratory: Clear to auscultation on my exam, on 2 L nasal cannula  Abdomen, soft, nontender, no rebound or guarding  Extremities: No remarkable edema in the bilateral extremities  Neuro: Awake alert  conversational, normal speech, moves all extremities        Diagnostic Data    Results from last 7 days   Lab Units 25  1401   WBC 10*3/mm3 15.93*   HEMOGLOBIN g/dL 10.2*   HEMATOCRIT % 34.8   PLATELETS 10*3/mm3 173   GLUCOSE mg/dL 111*   CREATININE mg/dL 1.03*   BUN mg/dL 20.1   SODIUM mmol/L 142   POTASSIUM mmol/L 4.8   AST (SGOT) U/L 17   ALT (SGPT) U/L 13   ALK PHOS U/L 117   BILIRUBIN mg/dL <0.2   ANION GAP mmol/L 9.7       No radiology results for the last day      I reviewed the patient's new clinical results.    Assessment/Plan:      Active and Resolved Problems  Active Hospital Problems    Diagnosis  POA    Pneumonia [J18.9]  Yes      Resolved Hospital Problems   No resolved problems to display.       Pneumonia  Bibasilar gram-negative versus gram-positive pneumonia  Currently back to her baseline O2 supplementation use  Persistent leukocytosis possibly secondary to CLL  Continue Rocephin, DuoNeb, Pulmicort  Added as needed Robitussin    CLL  Stable  WBC less than 16  Will continue monitoring  Recommend outpatient follow-up    LEXUS on CKD stage IIIb  Creatinine on presentation 1.8, last creatinine 0.03  Will continue to monitor and avoid nephrotoxins    Diabetes mellitus type 2  Last hemoglobin A1c 6/20/2025 at 6.11  Insulin therapy, diabetic diet    Hypertension, hyperlipidemia, hypothyroidism  Continue home medications    VTE Prophylaxis:  Pharmacologic & mechanical VTE prophylaxis orders are present.             Disposition Planning:   Planning on discharging patient 7/15/2025 to home with family      Time: 35 minutes     Code Status and Medical Interventions: CPR (Attempt to Resuscitate); Full Support   Ordered at: 07/11/25 0126     Code Status (Patient has no pulse and is not breathing):    CPR (Attempt to Resuscitate)     Medical Interventions (Patient has pulse or is breathing):    Full Support       Signature: Electronically signed by Tamera Toscano MD, 07/14/25, 14:15 EDT.  [facility] Hospitalist Team

## 2025-07-14 NOTE — PLAN OF CARE
Goal Outcome Evaluation:  Plan of Care Reviewed With: patient        Progress: improving  Outcome Evaluation: Rested well during the shift. O2 at 4L in use. No c/o discomfort. Family member at bedside.

## 2025-07-14 NOTE — THERAPY TREATMENT NOTE
"Subjective: Pt agreeable to therapeutic plan of care.  Pt is cleared for therapy by nursing and she is found up in chair with daughter at bedside; requires encouragement for mobilization due to feeling weak.      Objective:     Precautions - falls    Bed mobility - found up in chair and left up in chair  Transfers - CGA and with rolling walker  Ambulation - 50 feet CGA and with rolling walker    Therapeutic Exercise - standing with BUE support: heel raises, toe raises, MIP; seated: AP, LAQ, MIP x10-15 reps    Vitals: WNL    Pain: 0     Education: Provided education on the importance of mobility in the acute care setting, Transfer Training, and Gait Training    Assessment: Diane Guan presents with functional mobility impairments which indicate the need for skilled intervention. Patient demos low activity tolerance but requires no more than CGA for mobility.  Anticipate she will be safe for home discharge with HHPT, use of RW, and support from family.  Daughter voicing thy plan to provide 24/7 upon discharge; reviewed home safety and mobility strategies within the home.Tolerating session today without incident. Will continue to follow and progress as tolerated.     Plan/Recommendations:   If medically appropriate, Low Intensity Therapy recommended post-acute care - This is recommended as therapy feels this patient would require 2-3 visits per week. OP or HH would be the best option depending on patient's home bound status. Consider, if the patient has other  \"skilled\" needs such as wounds, IV antibiotics, etc. Combined with \"low intensity\" could also equate to a SNF. If patient is medically complex, consider LTAC. Pt requires no DME at discharge.     Pt desires Home, Home with Home Health, Home with family assist, and Home Health at discharge. Pt cooperative; agreeable to therapeutic recommendations and plan of care.         Basic Mobility 6-click:  Rollin = Total, A lot = 2, A little = 3; 4 = " None  Supine>Sit:   1 = Total, A lot = 2, A little = 3; 4 = None   Sit>Stand with arms:  1 = Total, A lot = 2, A little = 3; 4 = None  Bed>Chair:   1 = Total, A lot = 2, A little = 3; 4 = None  Ambulate in room:  1 = Total, A lot = 2, A little = 3; 4 = None  3-5 Steps with railin = Total, A lot = 2, A little = 3; 4 = None  Score: 18    Modified [place]: N/A = No pre-op stroke/TIA    Post-Tx Position: Up in Chair, Alarms activated, and Call light and personal items within reach  PPE: gloves    Therapy Charges for Today       Code Description Service Date Service Provider Modifiers Qty    72843937125 HC PT THERAPEUTIC ACT EA 15 MIN 2025 Adela Riggins, PT GP 1    22179531454 HC GAIT TRAINING EA 15 MIN 2025 Adela Riggins, PT GP 1           PT Charges       Row Name 25 1427             Time Calculation    Start Time 1312  -RR      Stop Time 1328  -RR      Time Calculation (min) 16 min  -RR      PT Received On 25  -RR      PT - Next Appointment 07/15/25  -RR         Time Calculation- PT    Total Timed Code Minutes- PT 16 minute(s)  -RR                User Key  (r) = Recorded By, (t) = Taken By, (c) = Cosigned By      Initials Name Provider Type    RR Adela Riggins PT Physical Therapist

## 2025-07-14 NOTE — PLAN OF CARE
Problem: Adult Inpatient Plan of Care  Goal: Absence of Hospital-Acquired Illness or Injury  Intervention: Identify and Manage Fall Risk  Recent Flowsheet Documentation  Taken 7/14/2025 1600 by Cruzito Mckeon RN  Safety Promotion/Fall Prevention: safety round/check completed  Taken 7/14/2025 1400 by Cruzito Mckeon RN  Safety Promotion/Fall Prevention: safety round/check completed  Taken 7/14/2025 1200 by Cruzito Mckeon RN  Safety Promotion/Fall Prevention: safety round/check completed  Taken 7/14/2025 1000 by Cruzito Mckeon RN  Safety Promotion/Fall Prevention: safety round/check completed  Intervention: Prevent Skin Injury  Recent Flowsheet Documentation  Taken 7/14/2025 1600 by Cruzito Mckeon RN  Skin Protection: incontinence pads utilized  Taken 7/14/2025 0800 by Cruzito Mckeon RN  Skin Protection: incontinence pads utilized  Intervention: Prevent Infection  Recent Flowsheet Documentation  Taken 7/14/2025 1600 by Cruzito Mckeon RN  Infection Prevention:   environmental surveillance performed   single patient room provided  Taken 7/14/2025 1400 by Cruzito Mckeon RN  Infection Prevention:   environmental surveillance performed   single patient room provided  Taken 7/14/2025 1200 by Cruzito Mckeon RN  Infection Prevention:   environmental surveillance performed   single patient room provided  Taken 7/14/2025 1000 by Cruzito Mckeon RN  Infection Prevention:   environmental surveillance performed   single patient room provided  Taken 7/14/2025 0800 by Cruzito Mckeon RN  Infection Prevention:   environmental surveillance performed   single patient room provided  Goal: Optimal Comfort and Wellbeing  Intervention: Provide Person-Centered Care  Recent Flowsheet Documentation  Taken 7/14/2025 0800 by Cruzito Mckeon RN  Trust Relationship/Rapport: care explained     Problem: Fall Injury Risk  Goal: Absence of Fall and Fall-Related Injury  Intervention: Promote Injury-Free  Environment  Recent Flowsheet Documentation  Taken 7/14/2025 1600 by Cruzito Mckeon RN  Safety Promotion/Fall Prevention: safety round/check completed  Taken 7/14/2025 1400 by Cruzito Mckeon RN  Safety Promotion/Fall Prevention: safety round/check completed  Taken 7/14/2025 1200 by Cruzito Mckeon RN  Safety Promotion/Fall Prevention: safety round/check completed  Taken 7/14/2025 1000 by Cruzito Mckeon RN  Safety Promotion/Fall Prevention: safety round/check completed     Problem: Skin Injury Risk Increased  Goal: Skin Health and Integrity  Intervention: Optimize Skin Protection  Recent Flowsheet Documentation  Taken 7/14/2025 1600 by Cruzito Mckeon RN  Pressure Reduction Techniques: frequent weight shift encouraged  Pressure Reduction Devices: positioning supports utilized  Skin Protection: incontinence pads utilized  Taken 7/14/2025 0800 by Cruzito Mckeon RN  Pressure Reduction Techniques: frequent weight shift encouraged  Pressure Reduction Devices: pressure-redistributing mattress utilized  Skin Protection: incontinence pads utilized     Problem: Sepsis/Septic Shock  Goal: Absence of Infection Signs and Symptoms  Intervention: Initiate Sepsis Management  Recent Flowsheet Documentation  Taken 7/14/2025 1600 by Cruzito Mckeon RN  Infection Prevention:   environmental surveillance performed   single patient room provided  Taken 7/14/2025 1400 by Cruzito Mckeon RN  Infection Prevention:   environmental surveillance performed   single patient room provided  Taken 7/14/2025 1200 by Cruzito Mckeon RN  Infection Prevention:   environmental surveillance performed   single patient room provided  Taken 7/14/2025 1000 by Cruzito Mckeon RN  Infection Prevention:   environmental surveillance performed   single patient room provided  Taken 7/14/2025 0800 by Cruzito Mckeon RN  Infection Prevention:   environmental surveillance performed   single patient room provided   Goal Outcome  Evaluation:

## 2025-07-14 NOTE — CASE MANAGEMENT/SOCIAL WORK
Continued Stay Note   [facility]     Patient Name: Diane Guan  MRN: [id number]  Today's Date: 7/14/2025    Admit Date: 7/10/2025    Plan: DC PLAN: From routine home with . Current home 02 2L at night(Unsure company) Declines SNF. Declines HHC.       Discharge Plan       Row Name 07/14/25 1356       Plan    Plan DC PLAN: From routine home with . Current home 02 2L at night(Unsure company) Declines SNF. Declines HHC.        Patient/Family in Agreement with Plan yes    Plan Comments CM Met with patient and family at bedside to discuss SNF. Strongly decline. states are going to a [facility] with hospital bed and all of the DME they could need. Also declines Home Health care. Anticipate discharge tomorrow.                      Expected Discharge Date and Time       Expected Discharge Date Expected Discharge Time    Jul 15, 2025           Brie Castorena RN   Case Management  [phone]

## 2025-07-15 ENCOUNTER — READMISSION MANAGEMENT (OUTPATIENT)
Dept: CALL CENTER | Facility: HOSPITAL | Age: 85
End: 2025-07-15
Payer: MEDICARE

## 2025-07-15 ENCOUNTER — TELEPHONE (OUTPATIENT)
Dept: FAMILY MEDICINE CLINIC | Facility: CLINIC | Age: 85
End: 2025-07-15

## 2025-07-15 VITALS
DIASTOLIC BLOOD PRESSURE: 70 MMHG | HEART RATE: 85 BPM | TEMPERATURE: 97.4 F | SYSTOLIC BLOOD PRESSURE: 161 MMHG | WEIGHT: 181.22 LBS | RESPIRATION RATE: 28 BRPM | BODY MASS INDEX: 30.94 KG/M2 | HEIGHT: 64 IN | OXYGEN SATURATION: 91 %

## 2025-07-15 PROBLEM — J18.9 PNEUMONIA, UNSPECIFIED ORGANISM: Status: ACTIVE | Noted: 2025-07-15

## 2025-07-15 LAB
ANION GAP SERPL CALCULATED.3IONS-SCNC: 12.2 MMOL/L (ref 5–15)
BACTERIA SPEC AEROBE CULT: NORMAL
BACTERIA SPEC AEROBE CULT: NORMAL
BUN SERPL-MCNC: 17.9 MG/DL (ref 8–23)
BUN/CREAT SERPL: 18.1 (ref 7–25)
CALCIUM SPEC-SCNC: 9.5 MG/DL (ref 8.6–10.5)
CHLORIDE SERPL-SCNC: 107 MMOL/L (ref 98–107)
CO2 SERPL-SCNC: 21.8 MMOL/L (ref 22–29)
CREAT SERPL-MCNC: 0.99 MG/DL (ref 0.57–1)
DEPRECATED RDW RBC AUTO: 54.2 FL (ref 37–54)
EGFRCR SERPLBLD CKD-EPI 2021: 56.3 ML/MIN/1.73
ERYTHROCYTE [DISTWIDTH] IN BLOOD BY AUTOMATED COUNT: 15.7 % (ref 12.3–15.4)
GLUCOSE BLDC GLUCOMTR-MCNC: 103 MG/DL (ref 70–105)
GLUCOSE BLDC GLUCOMTR-MCNC: 107 MG/DL (ref 70–105)
GLUCOSE SERPL-MCNC: 116 MG/DL (ref 65–99)
HCT VFR BLD AUTO: 34.8 % (ref 34–46.6)
HGB BLD-MCNC: 10.1 G/DL (ref 12–15.9)
HYPOCHROMIA BLD QL: ABNORMAL
LYMPHOCYTES # BLD MANUAL: 11.12 10*3/MM3 (ref 0.7–3.1)
LYMPHOCYTES NFR BLD MANUAL: 1 % (ref 5–12)
MCH RBC QN AUTO: 27.2 PG (ref 26.6–33)
MCHC RBC AUTO-ENTMCNC: 29 G/DL (ref 31.5–35.7)
MCV RBC AUTO: 93.8 FL (ref 79–97)
MONOCYTES # BLD: 0.15 10*3/MM3 (ref 0.1–0.9)
NEUTROPHILS # BLD AUTO: 4.17 10*3/MM3 (ref 1.7–7)
NEUTROPHILS NFR BLD MANUAL: 20 % (ref 42.7–76)
NEUTS BAND NFR BLD MANUAL: 7 % (ref 0–5)
OVALOCYTES BLD QL SMEAR: ABNORMAL
PLAT MORPH BLD: NORMAL
PLATELET # BLD AUTO: 169 10*3/MM3 (ref 140–450)
PMV BLD AUTO: 11.6 FL (ref 6–12)
POIKILOCYTOSIS BLD QL SMEAR: ABNORMAL
POTASSIUM SERPL-SCNC: 4.1 MMOL/L (ref 3.5–5.2)
RBC # BLD AUTO: 3.71 10*6/MM3 (ref 3.77–5.28)
SCAN SLIDE: NORMAL
SMUDGE CELLS BLD QL SMEAR: ABNORMAL
SODIUM SERPL-SCNC: 141 MMOL/L (ref 136–145)
VARIANT LYMPHS NFR BLD MANUAL: 72 % (ref 19.6–45.3)
WBC NRBC COR # BLD AUTO: 15.45 10*3/MM3 (ref 3.4–10.8)

## 2025-07-15 PROCEDURE — 85025 COMPLETE CBC W/AUTO DIFF WBC: CPT | Performed by: PEDIATRICS

## 2025-07-15 PROCEDURE — 82948 REAGENT STRIP/BLOOD GLUCOSE: CPT | Performed by: STUDENT IN AN ORGANIZED HEALTH CARE EDUCATION/TRAINING PROGRAM

## 2025-07-15 PROCEDURE — 94799 UNLISTED PULMONARY SVC/PX: CPT

## 2025-07-15 PROCEDURE — 94664 DEMO&/EVAL PT USE INHALER: CPT

## 2025-07-15 PROCEDURE — 82948 REAGENT STRIP/BLOOD GLUCOSE: CPT

## 2025-07-15 PROCEDURE — 80048 BASIC METABOLIC PNL TOTAL CA: CPT | Performed by: PEDIATRICS

## 2025-07-15 PROCEDURE — 94761 N-INVAS EAR/PLS OXIMETRY MLT: CPT

## 2025-07-15 PROCEDURE — 25010000002 CEFTRIAXONE PER 250 MG

## 2025-07-15 PROCEDURE — 85007 BL SMEAR W/DIFF WBC COUNT: CPT | Performed by: PEDIATRICS

## 2025-07-15 RX ORDER — GUAIFENESIN/DEXTROMETHORPHAN 100-10MG/5
5 SYRUP ORAL EVERY 4 HOURS PRN
Qty: 120 ML | Refills: 0 | Status: SHIPPED | OUTPATIENT
Start: 2025-07-15

## 2025-07-15 RX ADMIN — SERTRALINE HYDROCHLORIDE 25 MG: 25 TABLET, FILM COATED ORAL at 08:05

## 2025-07-15 RX ADMIN — IPRATROPIUM BROMIDE AND ALBUTEROL SULFATE 3 ML: .5; 3 SOLUTION RESPIRATORY (INHALATION) at 07:20

## 2025-07-15 RX ADMIN — Medication 10 ML: at 08:05

## 2025-07-15 RX ADMIN — APIXABAN 2.5 MG: 2.5 TABLET, FILM COATED ORAL at 08:05

## 2025-07-15 RX ADMIN — AMLODIPINE BESYLATE 10 MG: 5 TABLET ORAL at 08:05

## 2025-07-15 RX ADMIN — CHLORTHALIDONE 25 MG: 25 TABLET ORAL at 08:05

## 2025-07-15 RX ADMIN — LEVOTHYROXINE SODIUM 100 MCG: 100 TABLET ORAL at 05:14

## 2025-07-15 RX ADMIN — PANTOPRAZOLE SODIUM 40 MG: 40 TABLET, DELAYED RELEASE ORAL at 08:05

## 2025-07-15 RX ADMIN — BUDESONIDE 0.5 MG: 0.5 SUSPENSION RESPIRATORY (INHALATION) at 07:24

## 2025-07-15 RX ADMIN — CEFTRIAXONE 2000 MG: 2 INJECTION, POWDER, FOR SOLUTION INTRAMUSCULAR; INTRAVENOUS at 13:01

## 2025-07-15 NOTE — OUTREACH NOTE
Prep Survey      Flowsheet Row Responses   [facility] facility patient discharged from? [facility]   Is LACE score < 7 ? No   Eligibility [facility]   Date of Admission 07/10/25   Date of Discharge 07/15/25   Discharge Disposition Home or Self Care   Discharge diagnosis Pneumonia   Does the patient have one of the following disease processes/diagnoses(primary or secondary)? Pneumonia   Does the patient have Home health ordered? No   Is there a DME ordered? No   Prep survey completed? Yes            MIKE ZHANG - Registered Nurse

## 2025-07-15 NOTE — DISCHARGE INSTR - OTHER ORDERS
SIOMARA Jeronimo's office should be reaching out to you in the next 2 days  to schedule a hosptial follow up appointment. If you have not heard from her  office by 7/18/25, please call [phone], opt. 1 to schedule your an  appointment 7-10 days post discharge.     Dr. Jesus Rivers's office should be reaching out to you in the next 2 days  to schedule a hosptial follow up appointment. If you have not heard from her  office by 7/18/25, please call [phone], opt. 3 to schedule your an  appointment 2 weeks post discharge.

## 2025-07-15 NOTE — PLAN OF CARE
Goal Outcome Evaluation:  Plan of Care Reviewed With: patient        Progress: improving  Outcome Evaluation: Rested well during the shift. O2 at 2L in use. PRN cough med given for frequent productive cough. Possible discharge home with family today.

## 2025-07-15 NOTE — DISCHARGE SUMMARY
"[facility] Medicine Services  Discharge Summary    Date of Service: 7/15/2025  Patient Name: Diane Guan  : 1940  MRN: [id number]    Date of Admission: 7/10/2025  Discharge Diagnosis: <principal problem not specified>  Date of Discharge: 7/15/2025  Primary Care Physician: Asia Queen APRN      Presenting Problem:   Pneumonia [J18.9]  Leukocytosis, unspecified type [D72.829]  Pneumonia due to infectious organism, unspecified laterality, unspecified part of lung [J18.9]    Active and Resolved Hospital Problems:  Active Hospital Problems    Diagnosis POA    Pneumonia, unspecified organism [J18.9] Yes    Pneumonia [J18.9] Yes      Resolved Hospital Problems   No resolved problems to display.         Hospital Course     HPI:    \"Diane Guan is a 84 y.o. female with a previous medical history of CLL, CKD stage 3B, DM, HLD, HTN, Hypothyroidism  who presented from rehab to [facility] on 7/10/2025 with cough abnormal labs with leukocytosis.     Patient is currently awake, alert, conversational, daughter present at bedside who provided the history.  Patient daughter reported patient is currently at rehab facility was discharged recently after UTI treatment, was treated with IV antibiotics for 10 days, however at the facility she has developed cough, generalized weakness.  Her labs were drawn at the rehab facility found to have leukocytosis with WBC of 18's.  She was sent to hospital for further evaluation.  Patient otherwise denies any chest pain, does report cough, denies any fevers does report some chills, denies any dysuria at this time.       In ED, patient vital stable, afebrile, CMP remarkable creatinine of 1.18, improved from her past admission 1 to 2 weeks ago during which her creatinine was 1.8, WBC of 20.7, hemoglobin of 9.8, platelets 205, UA negative for bacteria, respiratory panel negative, blood cultures pending, MRSA screen negative.  Chest x-ray showing mild " "bibasilar patchy opacity which could represent atelectasis or pneumonia.  She was given a dose of vancomycin and cefepime, admitted to medicine team for further inpatient management.\"    Hospital Course:  The patient was treated for acute on chronic hypoxic respiratory failure secondary to pneumonia.  Imaging showed bibasilar infiltrates.  Completed a course of Rocephin.  O2 requirements are back to baseline 2 L/min.  The patient reported significant symptomatic improvement.  She also has a history of CLL which appears to be stable, WBC appears to be at her baseline.  She was also found to have LEXUS on CKD.  LEXUS resolved creatinine at 1 today.  Patient was instructed to follow-up with her hematology/oncology provider within 2 weeks.  Physical therapy evaluated the patient and they deemed her a candidate for SNF placement.  She rejected this idea.  The family reported that they are capable of caring for her at home.  She will be discharged with home health and PT and instructed to use a rolling walker.  The patient was also instructed to seek immediate medical attention if her symptoms worsen, persist or any other concerns arise.  She verbalized understanding the plan.  All questions and concerns were addressed.    DISCHARGE Follow Up Recommendations for labs and diagnostics: Pneumonia, respiratory failure, LEXUS/CKD, CLL, anemia        Day of Discharge     Vital Signs:  Temp:  [97.3 °F (36.3 °C)-98 °F (36.7 °C)] 97.4 °F (36.3 °C)  Heart Rate:  [76-86] 85  Resp:  [21-35] 28  BP: (147-174)/(65-77) 161/70  Flow (L/min) (Oxygen Therapy):  [2] 2    Physical Exam:  General: Awake, alert, conversational  HEENT, atraumatic normocephalic  Cardio, heart rate normal, rhythm regular  Respiratory: Clear to auscultation on my exam, on 2 L nasal cannula  Abdomen, soft, nontender, no rebound or guarding  Extremities: No remarkable edema in the bilateral extremities  Neuro: Awake alert  conversational, normal speech, moves all " extremities       Pertinent  and/or Most Recent Results     LAB RESULTS:      Lab 07/15/25  0527 07/14/25  1604 07/13/25  1401 07/11/25  0315 07/10/25  2040 07/10/25  1700   WBC 15.45* 15.41* 15.93* 18.96*  --  20.47*   HEMOGLOBIN 10.1* 10.2* 10.2* 9.3*  --  9.8*   HEMATOCRIT 34.8 35.5 34.8 32.4*  --  33.7*   PLATELETS 169 174 173 177  --  205   NEUTROS ABS 4.17 3.85  --  5.12  --  6.76   EOS ABS  --  0.15  --   --   --   --    MCV 93.8 93.9 94.1 95.6  --  94.9   PROCALCITONIN  --   --   --   --   --  0.09   LACTATE  --   --   --   --  1.3  --          Lab 07/15/25  [facility] 07/14/25  1604 07/13/25  1401 07/11/25  0315 07/10/25  1700   SODIUM 141 142 142 141 140   POTASSIUM 4.1 4.0 4.8 4.8 4.9   CHLORIDE 107 105 108* 109* 106   CO2 21.8* 22.5 24.3 22.4 21.6*   ANION GAP 12.2 14.5 9.7 9.6 12.4   BUN 17.9 19.2 20.1 24.6* 24.9*   CREATININE 0.99 1.02* 1.03* 1.01* 1.18*   EGFR 56.3* 54.4* 53.7* 55.0* 45.6*   GLUCOSE 116* 118* 111* 111* 126*   CALCIUM 9.5 9.2 9.4 9.1 9.0   MAGNESIUM  --   --   --  1.6  --    PHOSPHORUS  --   --   --  3.7  --          Lab 07/13/25  1401 07/11/25  0315 07/10/25  1700   TOTAL PROTEIN 6.3 6.0 6.2   ALBUMIN 3.8 3.5 3.8   GLOBULIN 2.5 2.5 2.4   ALT (SGPT) 13 12 13   AST (SGOT) 17 15 16   BILIRUBIN <0.2 <0.2 0.2   ALK PHOS 117 115 122*                     Brief Urine Lab Results  (Last result in the past 365 days)        Color   Clarity   Blood   Leuk Est   Nitrite   Protein   CREAT   Urine HCG        07/10/25 1711 Yellow   Clear   Negative   Negative   Negative   Negative                 Microbiology Results (last 10 days)       Procedure Component Value - Date/Time    MRSA Screen, PCR (Inpatient) - Swab, Nares [[id number]]  (Normal) Collected: 07/10/25 1837    Lab Status: Final result Specimen: Swab from Nares Updated: 07/10/25 2014     MRSA PCR No MRSA Detected    Narrative:      The negative predictive value of this diagnostic test is high and should only be used to consider de-escalating  anti-MRSA therapy. A positive result may indicate colonization with MRSA and must be correlated clinically.    Blood Culture - Blood, Arm, Left [[id number]]  (Normal) Collected: 07/10/25 1754    Lab Status: Preliminary result Specimen: Blood from Arm, Left Updated: 07/14/25 1800     Blood Culture No growth at 4 days    Blood Culture - Blood, Arm, Left [[id number]]  (Normal) Collected: 07/10/25 1754    Lab Status: Preliminary result Specimen: Blood from Arm, Left Updated: 07/14/25 1815     Blood Culture No growth at 4 days    Respiratory Panel PCR w/COVID-19(SARS-CoV-2) L[facility]LEE/VIVIANE/[facility]/PAD/COR/JUAN DAVID In-House, NP Swab in UTM/VTM, 2 HR TAT - Swab, Nasopharynx [[id number]]  (Normal) Collected: 07/10/25 1753    Lab Status: Final result Specimen: Swab from Nasopharynx Updated: 07/10/25 1907     ADENOVIRUS, PCR Not Detected     Coronavirus 229E Not Detected     Coronavirus HKU1 Not Detected     Coronavirus NL63 Not Detected     Coronavirus OC43 Not Detected     COVID19 Not Detected     Human Metapneumovirus Not Detected     Human Rhinovirus/Enterovirus Not Detected     Influenza A PCR Not Detected     Influenza B PCR Not Detected     Parainfluenza Virus 1 Not Detected     Parainfluenza Virus 2 Not Detected     Parainfluenza Virus 3 Not Detected     Parainfluenza Virus 4 Not Detected     RSV, PCR Not Detected     Bordetella pertussis pcr Not Detected     Bordetella parapertussis PCR Not Detected     Chlamydophila pneumoniae PCR Not Detected     Mycoplasma pneumo by PCR Not Detected    Narrative:      In the setting of a positive respiratory panel with a viral infection PLUS a negative procalcitonin without other underlying concern for bacterial infection, consider observing off antibiotics or discontinuation of antibiotics and continue supportive care. If the respiratory panel is positive for atypical bacterial infection (Bordetella pertussis, Chlamydophila pneumoniae, or Mycoplasma pneumoniae), consider antibiotic de-escalation to  target atypical bacterial infection.            CT Chest Without Contrast Diagnostic  Result Date: 7/11/2025  Impression: Impression: CT scan of the chest without IV contrast demonstrating subsegmental atelectasis and/or linear fibrosis at the lung bases, stable. Coronary artery calcifications are seen. Electronically Signed: Rene Ramirez MD  7/11/2025 3:38 PM EDT  Workstation ID: [id number]    XR Chest 1 View  Result Date: 7/10/2025  Impression: Impression: Mild bibasal patchy opacities which could represent atelectasis or pneumonia. Electronically Signed: Osman Franz MD  7/10/2025 5:35 PM EDT  Workstation ID: [id number]    CT Abdomen Pelvis Stone Protocol  Result Date: 6/25/2025  Impression: Impression: 1. 3 mm nonobstructing stone in the left lower renal pole. No ureteral stone. No hydronephrosis. 2. Small quantity air is seen in the urinary bladder lumen. Correlate for recent catheterization. 3. Uncomplicated colonic diverticulosis. 4. Additional chronic findings as described above. Electronically Signed: Marta Carrillo MD  6/25/2025 4:05 PM EDT  Workstation ID: [id number]    XR Knee 1 or 2 View Right  Result Date: 6/25/2025  Impression: Impression: 1. Negative for fracture. 2. Moderate osteoarthritis most pronounced in the medial compartment. 3. Small joint effusion. Electronically Signed: Pavan Amezquita MD  6/25/2025 3:37 PM EDT  Workstation ID: [id number]    US Renal Bilateral  Result Date: 6/25/2025  Impression: Impression: 1.No hydronephrosis or shadowing calculus. 2.Small bilateral renal cysts. Otherwise normal sonographic appearance of the kidneys. 3.Urinary bladder appears thick walled, which may be accentuated by nondistention. Correlate clinically for cystitis. Electronically Signed: David Chester MD  6/25/2025 10:31 AM EDT  Workstation ID: [id number]    XR Chest 1 View  Result Date: 6/24/2025  Impression: Impression: Chest: No acute process. Right elbow: Negative for fracture. Electronically Signed: Pavan  MD Bia  6/24/2025 10:37 AM EDT  Workstation ID: [id number]    XR Elbow 3+ View Right  Result Date: 6/24/2025  Impression: Impression: Chest: No acute process. Right elbow: Negative for fracture. Electronically Signed: Pavan Amezquita MD  6/24/2025 10:37 AM EDT  Workstation ID: [id number]      Results for orders placed during the hospital encounter of 07/05/24    Duplex Venous Lower Extremity - Bilateral CAR 07/06/2024  1:36 PM    Interpretation Summary    Acute left lower extremity deep vein thrombosis noted in the gastrocnemius.    All other veins appeared normal bilaterally.      Results for orders placed during the hospital encounter of 07/05/24    Duplex Venous Lower Extremity - Bilateral CAR 07/06/2024  1:36 PM    Interpretation Summary    Acute left lower extremity deep vein thrombosis noted in the gastrocnemius.    All other veins appeared normal bilaterally.      Results for orders placed during the hospital encounter of 07/05/24    Adult Transthoracic Echo Complete W/ Cont if Necessary Per Protocol 07/06/2024  3:40 PM    Interpretation Summary    Left ventricular systolic function is normal. Calculated left ventricular EF = 62%    Left ventricular wall thickness is consistent with mild concentric hypertrophy.    Left ventricular diastolic function is consistent with (grade Ia w/high LAP) impaired relaxation.    The left atrial cavity is mild to moderately dilated.    There is mild calcification of the aortic valve.    Transthoracic echocardiography reveals mild LVH with EF of 62%.  Diastolic dysfunction criteria noted.  Mild to moderate left atrial enlargement.  Mild AI and no effusion.    Electronically signed by Lucho Rodriguez MD, 07/06/24, 3:40 PM EDT.      Labs Pending at Discharge:  Pending Results       None            Procedures Performed           Consults:   Consults       Date and Time Order Name Status Description    7/10/2025  6:03 PM Hospitalist (on-call MD unless specified)      6/25/2025   8:10 AM Inpatient Infectious Diseases Consult Completed               Discharge Details        Discharge Medications        New Medications        Instructions Start Date   guaiFENesin-dextromethorphan 100-10 MG/5ML syrup  Commonly known as: ROBITUSSIN DM   5 mL, Oral, Every 4 Hours PRN             Continue These Medications        Instructions Start Date   acetaminophen 325 MG tablet  Commonly known as: TYLENOL   650 mg, Every 6 Hours PRN      amLODIPine 10 MG tablet  Commonly known as: NORVASC   10 mg, Oral, Daily, for high blood pressure      apixaban 2.5 MG tablet tablet  Commonly known as: ELIQUIS   2.5 mg, Oral, 2 Times Daily      atorvastatin 40 MG tablet  Commonly known as: LIPITOR   40 mg, Nightly      chlorthalidone 25 MG tablet  Commonly known as: HYGROTON   25 mg, Daily      docusate sodium 100 MG capsule  Commonly known as: COLACE   1 capsule, 2 Times Daily      ferrous sulfate 324 (65 Fe) MG tablet delayed-release EC tablet   1 tablet, 3 Times Weekly      levothyroxine 100 MCG tablet  Commonly known as: SYNTHROID, LEVOTHROID   TAKE 1 TABLET BY MOUTH EVERY DAY      losartan 100 MG tablet  Commonly known as: COZAAR   100 mg, Daily      metFORMIN 500 MG tablet  Commonly known as: GLUCOPHAGE   500 mg, Oral, Daily With Breakfast      metoclopramide 5 MG tablet  Commonly known as: REGLAN   5 mg, Oral, 3 Times Daily PRN      mirtazapine 15 MG tablet  Commonly known as: REMERON   15 mg, Oral, Every Night at Bedtime      pantoprazole 40 MG EC tablet  Commonly known as: PROTONIX   40 mg, Daily      rOPINIRole 0.25 MG tablet  Commonly known as: REQUIP   0.25 mg, Nightly      Sertraline HCl 150 MG capsule   150 mg, Daily               No Known Allergies      Discharge Disposition: Home with home health and PT  Home or Self Care    Diet:  Hospital:  Diet Order   Procedures    Diet: Diabetic; Consistent Carbohydrate; Fluid Consistency: Thin (IDDSI 0)         Discharge Activity:         CODE STATUS:  Code Status and  Medical Interventions: CPR (Attempt to Resuscitate); Full Support   Ordered at: 07/11/25 0126     Code Status (Patient has no pulse and is not breathing):    CPR (Attempt to Resuscitate)     Medical Interventions (Patient has pulse or is breathing):    Full Support         Future Appointments   Date Time Provider Department Center   9/12/2025  2:45 PM Asia Queen APRN [facility]   9/17/2025  1:00 PM NURSE/MA [facility]   9/25/2025  2:15 PM Gregory Rivers MD [facility]           Time spent on Discharge including face to face service:  40 minutes    Signature: Electronically signed by Tamera Toscano MD, 07/15/25, 11:44 EDT.  [facility] Hospitalist Team

## 2025-07-15 NOTE — DISCHARGE INSTR - APPOINTMENTS
SIOMARA Jeronimo's office should be reaching out to you in the next 2 days  to schedule a hosptial follow up appointment. If you have not heard from her  office by 7/18/25, please call [phone], opt. 1 to schedule your an  appointment 7-10 days post discharge.     Dr. Jesus Rivers's office should be reaching out to you in the next 2 days  to schedule a hosptial follow up appointment. If you have not heard from her  office by 7/18/25, please call [phone], opt. 3 to schedule your an  appointment 2 weeks post discharge.

## 2025-07-15 NOTE — SIGNIFICANT NOTE
07/15/25 1239   OTHER   Discipline occupational therapist   Rehab Time/Intention   Session Not Performed other (see comments)  (anticipate d/c home with spouse, this date)   Recommendation   OT - Next Appointment 07/16/25

## 2025-07-16 ENCOUNTER — TELEPHONE (OUTPATIENT)
Dept: ONCOLOGY | Facility: CLINIC | Age: 85
End: 2025-07-16
Payer: MEDICARE

## 2025-07-16 ENCOUNTER — TRANSITIONAL CARE MANAGEMENT TELEPHONE ENCOUNTER (OUTPATIENT)
Dept: CALL CENTER | Facility: HOSPITAL | Age: 85
End: 2025-07-16
Payer: MEDICARE

## 2025-07-16 NOTE — TELEPHONE ENCOUNTER
LVM on Monika's phone (per Diane' request) asking for call back to sched pt for 2 week hosp f/u in [place].

## 2025-07-16 NOTE — OUTREACH NOTE
Call Center TCM Note      Flowsheet Row Responses   [facility] patient discharged from? [facility]   Does the patient have one of the following disease processes/diagnoses(primary or secondary)? Pneumonia   TCM attempt successful? Yes   Call start time 1336   Call end time 1345   Discharge diagnosis Pneumonia   Meds reviewed with patient/caregiver? Yes   Is the patient having any side effects they believe may be caused by any medication additions or changes? No   Does the patient have all medications ordered at discharge? Yes   Is the patient taking all medications as directed (includes completed medication regime)? Yes   Does the patient have an appointment with their PCP within 7-14 days of discharge? No   Nursing Interventions Patient declined scheduling/rescheduling appointment at this time  [Dtr will call office to schedule an appt]   Has home health visited the patient within 72 hours of discharge? N/A   DME comments continuous 2LO2, pt uses a walker for balance   Pulse Ox monitoring Intermittent   Pulse Ox device source Patient   O2 Sat comments normally 92-93% on O2   Psychosocial issues? No   Comments Pt monitors BS daily, reports normally 120's   Did the patient receive a copy of their discharge instructions? Yes   Nursing interventions Reviewed instructions with patient   What is the patient's perception of their health status since discharge? Improving   Nursing Interventions Nurse provided patient education   Is the patient/caregiver able to teach back the hierarchy of who to call/visit for symptoms/problems? PCP, Specialist, Home health nurse, Urgent Care, ED, 911 Yes   Is the patient/caregiver able to teach back signs and symptoms of worsening condition: Fever/chills, Shortness of breath, Chest pain   Is the patient/caregiver able to teach back importance of completing antibiotic course of treatment? No  [no abx given at d/c]   TCM call completed? Yes   Call end time 1345   Would this patient benefit  from a Referral to [facility] Social Work? No   Is the patient interested in additional calls from an ambulatory ? No            Asia S - Registered Nurse    7/16/2025, 13:45 EDT

## 2025-07-21 ENCOUNTER — TELEPHONE (OUTPATIENT)
Dept: FAMILY MEDICINE CLINIC | Facility: CLINIC | Age: 85
End: 2025-07-21

## 2025-07-24 NOTE — PROGRESS NOTES
Enter Query Response Below      Query Response: Acute on chronic hypoxic respiratory failure supported by increased O2 supplementation demands which resolved following pneumonia treatment.             If applicable, please update the problem list.

## 2025-07-24 NOTE — PROGRESS NOTES
Enter Query Response Below      Query Response: Since creatinine was 1.18 on presentation, a.k.a. was not supported during this admission.             If applicable, please update the problem list.

## 2025-08-06 ENCOUNTER — OFFICE VISIT (OUTPATIENT)
Dept: FAMILY MEDICINE CLINIC | Facility: CLINIC | Age: 85
End: 2025-08-06
Payer: MEDICARE

## 2025-08-06 VITALS
OXYGEN SATURATION: 96 % | WEIGHT: 163 LBS | SYSTOLIC BLOOD PRESSURE: 151 MMHG | BODY MASS INDEX: 27.83 KG/M2 | HEART RATE: 78 BPM | DIASTOLIC BLOOD PRESSURE: 78 MMHG | HEIGHT: 64 IN

## 2025-08-06 DIAGNOSIS — C91.10 CLL (CHRONIC LYMPHOCYTIC LEUKEMIA): ICD-10-CM

## 2025-08-06 DIAGNOSIS — J18.9 COMMUNITY ACQUIRED PNEUMONIA, UNSPECIFIED LATERALITY: Primary | ICD-10-CM

## 2025-08-21 DIAGNOSIS — I13.10 HYPERTENSIVE HEART AND CHRONIC KIDNEY DISEASE STAGE 3: ICD-10-CM

## 2025-08-21 DIAGNOSIS — I13.10 HYPERTENSIVE HEART AND CHRONIC KIDNEY DISEASE WITHOUT HEART FAILURE, WITH STAGE 1 THROUGH STAGE 4 CHRONIC KIDNEY DISEASE, OR UNSPECIFIED CHRONIC KIDNEY DISEASE: ICD-10-CM

## 2025-08-21 DIAGNOSIS — N18.30 HYPERTENSIVE HEART AND CHRONIC KIDNEY DISEASE STAGE 3: ICD-10-CM

## 2025-08-21 DIAGNOSIS — K44.9 DIAPHRAGMATIC HERNIA WITHOUT OBSTRUCTION OR GANGRENE: ICD-10-CM

## 2025-08-21 RX ORDER — IRBESARTAN 300 MG/1
300 TABLET ORAL DAILY
Qty: 90 TABLET | Refills: 1 | OUTPATIENT
Start: 2025-08-21

## 2025-08-21 RX ORDER — CHLORTHALIDONE 25 MG/1
TABLET ORAL
Qty: 90 TABLET | Refills: 1 | Status: SHIPPED | OUTPATIENT
Start: 2025-08-21

## 2025-08-21 RX ORDER — PANTOPRAZOLE SODIUM 40 MG/1
TABLET, DELAYED RELEASE ORAL
Qty: 90 TABLET | Refills: 1 | Status: SHIPPED | OUTPATIENT
Start: 2025-08-21

## (undated) DEVICE — ENDOPATH PNEUMONEEDLE INSUFFLATION NEEDLES WITH LUER LOCK CONNECTORS 120MM: Brand: ENDOPATH

## (undated) DEVICE — 3M™ STERI-STRIP™ REINFORCED ADHESIVE SKIN CLOSURES, R1547, 1/2 IN X 4 IN (12 MM X 100 MM), 6 STRIPS/ENVELOPE: Brand: 3M™ STERI-STRIP™

## (undated) DEVICE — SUTURING DEVICE: Brand: ENDO STITCH

## (undated) DEVICE — DRSNG SURESITE WNDW 2.38X2.75

## (undated) DEVICE — DRAPE, LAVH, STERILE: Brand: MEDLINE

## (undated) DEVICE — UNDYED BRAIDED (POLYGLACTIN 910), SYNTHETIC ABSORBABLE SUTURE: Brand: COATED VICRYL

## (undated) DEVICE — ENDOPATH 5MM CURVED SCISSORS WITH MONOPOLAR CAUTERY: Brand: ENDOPATH

## (undated) DEVICE — BAPTIST FLOYD BRONCHOSCOPY: Brand: MEDLINE INDUSTRIES, INC.

## (undated) DEVICE — GENERAL LAPAROSCOPY CDS: Brand: MEDLINE INDUSTRIES, INC.

## (undated) DEVICE — PK ENDO GI 50

## (undated) DEVICE — BITEBLOCK ENDO W/STRAP 60F A/ LF DISP

## (undated) DEVICE — POLYESTER SINGLE STITCH RELOAD: Brand: SURGIDAC

## (undated) DEVICE — SYR LL 3CC

## (undated) DEVICE — SYR LL TP 10ML STRL

## (undated) DEVICE — ENDOPATH XCEL DILATING TIP TROCARS WITH STABILITY SLEEVES: Brand: ENDOPATH XCEL

## (undated) DEVICE — SLV SCD CALF HEMOFORCE DVT THERP REPROC MD

## (undated) DEVICE — TOTAL TRAY, DB, 100% SILI FOLEY, 16FR 10: Brand: MEDLINE

## (undated) DEVICE — 40580 - THE PINK PAD - ADVANCED TRENDELENBURG POSITIONING KIT: Brand: 40580 - THE PINK PAD - ADVANCED TRENDELENBURG POSITIONING KIT

## (undated) DEVICE — DEV LAP LIGASURE BLNT SEALER/DIV MARYLAND 37CM

## (undated) DEVICE — SPNG GZ AVANT 6PLY 4X4IN STRL PK/2

## (undated) DEVICE — LAPAROSCOPIC GAS CONDITIONING DEVICE.: Brand: INSUFLOW

## (undated) DEVICE — PETROLATUM GAUZE CISION DRESSING: Brand: VASELINE

## (undated) DEVICE — SOL IRRIG H2O 1000ML STRL

## (undated) DEVICE — GLV SURG BIOGEL SENSR LTX PF SZ7.5

## (undated) DEVICE — KT SURG TURNOVER 050

## (undated) DEVICE — SUT VIC 0 SUTUPAK TIES 18IN J906G